# Patient Record
Sex: MALE | Race: WHITE | NOT HISPANIC OR LATINO | Employment: OTHER | URBAN - METROPOLITAN AREA
[De-identification: names, ages, dates, MRNs, and addresses within clinical notes are randomized per-mention and may not be internally consistent; named-entity substitution may affect disease eponyms.]

---

## 2017-01-05 ENCOUNTER — OFFICE VISIT (OUTPATIENT)
Dept: FAMILY MEDICINE | Facility: CLINIC | Age: 67
End: 2017-01-05
Payer: COMMERCIAL

## 2017-01-05 VITALS
SYSTOLIC BLOOD PRESSURE: 160 MMHG | HEART RATE: 76 BPM | DIASTOLIC BLOOD PRESSURE: 76 MMHG | WEIGHT: 288 LBS | OXYGEN SATURATION: 93 % | BODY MASS INDEX: 40.32 KG/M2 | HEIGHT: 71 IN | TEMPERATURE: 97.4 F

## 2017-01-05 DIAGNOSIS — R00.2 PALPITATIONS: ICD-10-CM

## 2017-01-05 DIAGNOSIS — M79.672 BILATERAL FOOT PAIN: Primary | ICD-10-CM

## 2017-01-05 DIAGNOSIS — E66.9 OBESITY, UNSPECIFIED OBESITY SEVERITY, UNSPECIFIED OBESITY TYPE: ICD-10-CM

## 2017-01-05 DIAGNOSIS — M79.10 MUSCLE PAIN: Primary | ICD-10-CM

## 2017-01-05 DIAGNOSIS — M79.671 BILATERAL FOOT PAIN: Primary | ICD-10-CM

## 2017-01-05 DIAGNOSIS — I10 HYPERTENSION GOAL BP (BLOOD PRESSURE) < 140/90: ICD-10-CM

## 2017-01-05 PROCEDURE — 99213 OFFICE O/P EST LOW 20 MIN: CPT | Performed by: FAMILY MEDICINE

## 2017-01-05 RX ORDER — METOPROLOL SUCCINATE 25 MG/1
25 TABLET, EXTENDED RELEASE ORAL DAILY
Qty: 30 TABLET | Refills: 1 | Status: SHIPPED | OUTPATIENT
Start: 2017-01-05 | End: 2017-02-16

## 2017-01-05 RX ORDER — VITAMIN E 268 MG
400 CAPSULE ORAL DAILY
Qty: 30 CAPSULE | COMMUNITY
Start: 2017-01-05 | End: 2018-03-12

## 2017-01-05 ASSESSMENT — PAIN SCALES - GENERAL: PAINLEVEL: MODERATE PAIN (5)

## 2017-01-05 NOTE — MR AVS SNAPSHOT
After Visit Summary   1/5/2017    Gucci Logan    MRN: [id number]           Patient Information     Date Of Birth          1950        Visit Information        Provider Department      1/5/2017 10:00 AM Richi Jaramillo MD [facility]        Today's Diagnoses     Bilateral foot pain    -  1     Hypertension goal BP (blood pressure) < 140/90           Care Instructions    Start metoprolol 25 mg once daily    Continue other blood pressure meds also    See us in a month or so    Call with side effects / problems    See podiatry    See cardiology after the echo is done    Increase activity/ exercise         Follow-ups after your visit        Additional Services     PODIATRY/FOOT & ANKLE SURGERY REFERRAL       Your provider has referred you to: FMG: [facility] [phone]   [email or web address]  FMG: [facility] [phone]   [email or web address]    Please be aware that coverage of these services is subject to the terms and limitations of your health insurance plan.  Call member services at your health plan with any benefit or coverage questions.      Please bring the following to your appointment:  >>   Any x-rays, CTs or MRIs which have been performed.  Contact the facility where they were done to arrange for  prior to your scheduled appointment.    >>   List of current medications   >>   This referral request   >>   Any documents/labs given to you for this referral                  Your next 10 appointments already scheduled     Jan 11, 2017  9:00 AM   New Visit with JAZ Hightower MD   [facility] AT [facility] ([facility])    [address] [place] [address] 2nd Floor  [facility] [address] [phone]              Who to contact     If you have questions or need follow up information about today's clinic  "visit or your schedule please contact [facility] directly at [phone].  Normal or non-critical lab and imaging results will be communicated to you by MyChart, letter or phone within 4 business days after the clinic has received the results. If you do not hear from us within 7 days, please contact the clinic through MyChart or phone. If you have a critical or abnormal lab result, we will notify you by phone as soon as possible.  Submit refill requests through [employer] or call your pharmacy and they will forward the refill request to us. Please allow 3 business days for your refill to be completed.          Additional Information About Your Visit        Care EveryWhere ID     This is your Care EveryWhere ID. This could be used by other organizations to access your [place] medical records  [id number]        Your Vitals Were     Pulse Temperature Height BMI (Body Mass Index) Pulse Oximetry       76 97.4  F (36.3  C) (Oral) 5' 11\" (1.803 m) 40.19 kg/m2 93%        Blood Pressure from Last 3 Encounters:   01/05/17 160/76   12/28/16 139/75   12/16/16 158/72    Weight from Last 3 Encounters:   01/05/17 288 lb (130.636 kg)   12/28/16 284 lb (128.822 kg)   12/16/16 278 lb (126.1 kg)              We Performed the Following     PODIATRY/FOOT & ANKLE SURGERY REFERRAL          Today's Medication Changes          These changes are accurate as of: 1/5/17 10:43 AM.  If you have any questions, ask your nurse or doctor.               Start taking these medicines.        Dose/Directions    metoprolol 25 MG 24 hr tablet   Commonly known as:  TOPROL-XL   Used for:  Hypertension goal BP (blood pressure) < 140/90   Started by:  Richi Jaramillo MD        Dose:  25 mg   Take 1 tablet (25 mg) by mouth daily   Quantity:  30 tablet   Refills:  1            Where to get your medicines      These medications were sent to [facility] - [address]  [address] " [place] [address]     Phone:  [phone]    - metoprolol 25 MG 24 hr tablet             Primary Care Provider Office Phone # Fax #    Richi Jaramillo [phone]       [facility] [address]  [facility] [address]        Thank you!     Thank you for choosing [facility]  for your care. Our goal is always to provide you with excellent care. Hearing back from our patients is one way we can continue to improve our services. Please take a few minutes to complete the written survey that you may receive in the mail after your visit with us. Thank you!             Your Updated Medication List - Protect others around you: Learn how to safely use, store and throw away your medicines at www.disposemymeds.org.          This list is accurate as of: 1/5/17 10:43 AM.  Always use your most recent med list.                   Brand Name Dispense Instructions for use    amLODIPine 2.5 MG tablet    NORVASC    90 tablet    Take 1 tablet (2.5 mg) by mouth daily       azithromycin 250 MG tablet    ZITHROMAX    6 tablet    2 po daily x one day then 1 po daily x 4 days       cyclobenzaprine 10 MG tablet    FLEXERIL    90 tablet    Take 1 tablet (10 mg) by mouth 3 times daily as needed for muscle spasms       glutamine 500 MG capsule      Take 1 capsule (500 mg) by mouth daily       hydrochlorothiazide 25 MG tablet    HYDRODIURIL    90 tablet    Take 1 tablet (25 mg) by mouth daily       losartan 100 MG tablet    COZAAR    90 tablet    Take 1 tablet (100 mg) by mouth daily       metoprolol 25 MG 24 hr tablet    TOPROL-XL    30 tablet    Take 1 tablet (25 mg) by mouth daily       omeprazole 20 MG tablet     90 tablet    Take 1 tablet (20 mg) by mouth daily Take 30-60 minutes before a meal.       oxyCODONE 5 MG IR tablet    ROXICODONE    30 tablet    Take 1 tablet (5 mg) by mouth every 6 hours as needed for pain       vitamin D 2000 UNITS Caps     90 capsule    1 po daily        vitamin E 400 UNIT capsule     30 capsule    Take 1 capsule (400 Units) by mouth daily

## 2017-01-05 NOTE — NURSING NOTE
"Chief Complaint   Patient presents with     RECHECK     heart      Health Maintenance       Initial /73 mmHg  Pulse 76  Temp(Src) 97.4  F (36.3  C) (Oral)  Ht 5' 11\" (1.803 m)  Wt 288 lb (130.636 kg)  BMI 40.19 kg/m2  SpO2 93% Estimated body mass index is 40.19 kg/(m^2) as calculated from the following:    Height as of this encounter: 5' 11\" (1.803 m).    Weight as of this encounter: 288 lb (130.636 kg).  BP completed using cuff size: large taken on the left arm  Sofia Conde CMA      "

## 2017-01-05 NOTE — PATIENT INSTRUCTIONS
Start metoprolol 25 mg once daily    Continue other blood pressure meds also    See us in a month or so    Call with side effects / problems    See podiatry    See cardiology after the echo is done    Increase activity/ exercise

## 2017-01-05 NOTE — Clinical Note
[address] [address] [phone]             2017      To whom it may concern:    Gucci Logan (  [birth year] ) is a 66 year old patient of mine with multiple medical problems.  Due to these issues he is not able to cut his toenails by himself.  Advise he be able to use health spending account to use for pedicures/ nail trimming.               Sincerely,                         Richi Jaramillo MD

## 2017-01-05 NOTE — PROGRESS NOTES
SUBJECTIVE:                                                    Gucci Logan is a 66 year old male who presents to clinic today for the following health issues:       Discuss getting a hospital bed  Patient has not had the echo yet  Restless legs  Runny nose when going out in the cold  [place] go [place]         Problem list and histories reviewed & adjusted, as indicated.  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  See us in a month, sooner if needed.     (E66.9) Obesity, unspecified obesity severity, unspecified obesity type  Comment: chronic   Plan: keep working on healthy diet/exercise and wt loss     (R00.2) Palpitations  Comment: symptoms present but not as bad   Plan: okay to wait on the echo and cardiology consult until he returns from travels       I reviewed the patient's medications, allergies, medical history, family history, and social history.    Richi Jaramillo MD

## 2017-01-05 NOTE — TELEPHONE ENCOUNTER
cyclobenzaprine (FLEXERIL) 10 MG tablet      Last Written Prescription Date:  12/2/16  Last Fill Quantity: 90,   # refills: 0  Last Office Visit with [facility] or [facility] prescribing provider: 1/5/17  Future Office visit:       Routing refill request to provider for review/approval because:  Drug not on the [facility] or [facility] refill protocol or controlled substance

## 2017-01-06 ENCOUNTER — OFFICE VISIT (OUTPATIENT)
Dept: PODIATRY | Facility: CLINIC | Age: 67
End: 2017-01-06
Payer: COMMERCIAL

## 2017-01-06 ENCOUNTER — TELEPHONE (OUTPATIENT)
Dept: FAMILY MEDICINE | Facility: CLINIC | Age: 67
End: 2017-01-06

## 2017-01-06 VITALS
BODY MASS INDEX: 40.32 KG/M2 | HEART RATE: 64 BPM | WEIGHT: 288 LBS | HEIGHT: 71 IN | SYSTOLIC BLOOD PRESSURE: 166 MMHG | DIASTOLIC BLOOD PRESSURE: 60 MMHG

## 2017-01-06 DIAGNOSIS — M20.41 HAMMER TOE OF RIGHT FOOT: ICD-10-CM

## 2017-01-06 DIAGNOSIS — Q66.50 CONGENITAL PES PLANUS, UNSPECIFIED LATERALITY: ICD-10-CM

## 2017-01-06 DIAGNOSIS — M79.672 PAIN IN BOTH FEET: Primary | ICD-10-CM

## 2017-01-06 DIAGNOSIS — M79.671 PAIN IN BOTH FEET: Primary | ICD-10-CM

## 2017-01-06 PROCEDURE — 99203 OFFICE O/P NEW LOW 30 MIN: CPT | Performed by: PODIATRIST

## 2017-01-06 RX ORDER — CYCLOBENZAPRINE HCL 10 MG
10 TABLET ORAL 3 TIMES DAILY PRN
Qty: 90 TABLET | Refills: 0 | Status: SHIPPED | OUTPATIENT
Start: 2017-01-06 | End: 2017-07-11

## 2017-01-06 NOTE — NURSING NOTE
"Chief Complaint   Patient presents with     Plantar Fascitis     B feet     Hammer Toe     surgery done 9/2016 right 2nd toe     NEUROPATHY       Initial /60 mmHg  Pulse 64  Ht 1.803 m (5' 11\")  Wt 130.636 kg (288 lb)  BMI 40.19 kg/m2 Estimated body mass index is 40.19 kg/(m^2) as calculated from the following:    Height as of this encounter: 1.803 m (5' 11\").    Weight as of this encounter: 130.636 kg (288 lb).  BP completed using cuff size: Large    "

## 2017-01-06 NOTE — PROGRESS NOTES
S:  Patient seen in consult from Dr. Jaramillo and complains of bilateral foot pain.  Points to medial arch.  Has had this for a few years.  Describes it as a burning pain.  Aggrevated by activity and relieved by rest.  Getting worse lately.  Standing at work.  He is a realtor, but now working less.  Had right second toe surgery 6 months ago and not happy since toe still elevated.  Went to work two weeks after surgery.        ROS:  Denies bruising, swelling,weakness, or numbness.       Allergies   Allergen Reactions     Influenza Vac Typ Other (See Comments)     Delirium, fever,       Current Outpatient Prescriptions   Medication Sig Dispense Refill     cyclobenzaprine (FLEXERIL) 10 MG tablet Take 1 tablet (10 mg) by mouth 3 times daily as needed for muscle spasms 90 tablet 0     vitamin E 400 UNIT capsule Take 1 capsule (400 Units) by mouth daily 30 capsule      glutamine 500 MG capsule Take 1 capsule (500 mg) by mouth daily       metoprolol (TOPROL-XL) 25 MG 24 hr tablet Take 1 tablet (25 mg) by mouth daily 30 tablet 1     oxyCODONE (ROXICODONE) 5 MG IR tablet Take 1 tablet (5 mg) by mouth every 6 hours as needed for pain 30 tablet 0     losartan (COZAAR) 100 MG tablet Take 1 tablet (100 mg) by mouth daily 90 tablet 1     hydrochlorothiazide (HYDRODIURIL) 25 MG tablet Take 1 tablet (25 mg) by mouth daily 90 tablet 1     amLODIPine (NORVASC) 2.5 MG tablet Take 1 tablet (2.5 mg) by mouth daily 90 tablet 1     omeprazole 20 MG tablet Take 1 tablet (20 mg) by mouth daily Take 30-60 minutes before a meal. 90 tablet 3     Cholecalciferol (VITAMIN D) 2000 UNITS CAPS 1 po daily 90 capsule 3     azithromycin (ZITHROMAX) 250 MG tablet 2 po daily x one day then 1 po daily x 4 days 6 tablet 1       Patient Active Problem List   Diagnosis     Advanced directives, counseling/discussion     CARDIOVASCULAR SCREENING; LDL GOAL LESS THAN 130     Hypertension goal BP (blood pressure) < 140/90     Anxiety     Obesity, unspecified obesity  "severity, unspecified obesity type     Gastroesophageal reflux disease, esophagitis presence not specified       Past Medical History   Diagnosis Date     Hypertension      Obesity 1/24/2013     Sleep apnea      Advised CPAP machine. Not keen to use it.      Esophageal reflux 3/1/2014     Liver disease      Arthritis      Hearing problem      Obstructive sleep apnea      Hiatal hernia        Past Surgical History   Procedure Laterality Date     C spinal fusion,ant,ea adnl level       T12 - L1     C open rx ankle dislocatn+fixatn       (R)     Arthroscopy knee rt/lt       (L) with partial medial meniscectomy     Endoscopic endonasal surgery  1994     Rotator cuff repair rt/lt Right      Hernia surgery Left 1994     Endoscopy  2-19-15     Nasal/sinus polypectomy         Family History   Problem Relation Age of Onset     Alzheimer Disease Mother 85     CEREBROVASCULAR DISEASE Father 50     CANCER Father      Neurologic Disorder No family hx of      Dementia Mother      DIABETES No family hx of      Hypertension Father        Social History   Substance Use Topics     Smoking status: Former Smoker -- 5 years     Types: Cigarettes     Start date: 01/01/1990     Quit date: 01/01/1999     Smokeless tobacco: Never Used     Alcohol Use: No      Comment: quit in 1999         O:  /60 mmHg  Pulse 64  Ht 1.803 m (5' 11\")  Wt 130.636 kg (288 lb)  BMI 40.19 kg/m2.  Good historian.  A&O X 3.  Pulses DP, PT 2/4 b/l.  CRT < 3 seconds X 10 digits.  Bilateral ankle edema or varicosities noted.  Sensation to light touch intact b/l.  Reflexes 2/4 b/l.  Skin thin, shiny, no hair and trophic changes b/l.  No forefoot deformities noted on left and right second toe elevated.  MS 5/5 all compartments.  Normal ROM all fore foot and rearfoot joints.  No equinus.  With weightbearing patient has bilateral pronation.  No pain with palpation or ROM.  No pain with stressing any muscle compartments.  Good calcaneal iversion with foot " flexion.  no erythema edema or ecchymosis or masses noted.  X-rays normal.      A:  Pronation causing pain       Right second hammertoe    P:  Personally reviewed x-rays.  RX for custom orthotics.  Discussed importance of wearing these in a good shoe at all times to prevent future problems.  Discussed good house shoes at all times until resolved.  Avoid activities that bother this.  he will manually reduce toe daily and instructed him on taping this down with band aid.    RETURN TO CLINIC PRN.  Thank you for allowing me participate in the care of this patient.        Scooter Richards DPM, FACFAS

## 2017-01-06 NOTE — TELEPHONE ENCOUNTER
Routed to provider as FYI regarding patient's visit with Dr. Richards today.    Lashell Isidro RN  [facility]

## 2017-01-06 NOTE — TELEPHONE ENCOUNTER
Reason for Call:  Other     Detailed comments: Patient saw Dr. Richards today and wanted you to check notes about bld pressure & pulse just started new meds     Phone Number Patient can be reached at: Home number on file [phone] (home)    Best Time: Anytime    Can we leave a detailed message on this number? YES    Call taken on 1/6/2017 at 10:36 AM by Natalia Sainz

## 2017-01-06 NOTE — MR AVS SNAPSHOT
After Visit Summary   1/6/2017    Gucci Logan    MRN: [id number]           Patient Information     Date Of Birth          1950        Visit Information        Provider Department      1/6/2017 10:15 AM Scooter Richards DPM [facility]        Care Instructions    We wish you continued good healing. If you have any questions or concerns, please do not hesitate to contact us at [phone].      Please remember to call and schedule a follow up appointment if one was recommended at your earliest convenience.   PODIATRY CLINIC HOURS  TELEPHONE NUMBER    Dr. Scooter BERRIOSPSHA FAC FAS    Clinics:  [facility]        Tiffanie Bobby MA  Medical Assistant  Tuesday 1PM-6PM  [facility]  Wednesday 7AM-2PM  [place]  Thursday 10AM-6PM  [place]  Friday 7AM-345PM  [place]  Specialty schedulers:   [phone] to make an appointment with any Specialty Provider.        Urgent Care locations:    [facility] Monday-Friday 5 pm - 9 pm. Saturday-Sunday 9 am -5pm    Monday-Friday 11 am - 9 pm Saturday 9 am - 5 pm     Monday-Sunday 12 noon-8PM [phone]     If you need a medication refill, please contact us you may need lab work and/or a follow up visit prior to your refill (i.e. Antifungal medications).    [employer] (secure e-mail communication and access to your chart) to send a message or to make an appointment.    If MRI needed please call Valentin Bill at [phone]        Weight management plan: Patient was referred to their PCP to discuss a diet and exercise plan.          Follow-ups after your visit        Who to contact     If you have questions or need follow up information about today's clinic visit or your schedule please contact [facility] directly at [phone].  Normal or non-critical lab and  "imaging results will be communicated to you by MyChart, letter or phone within 4 business days after the clinic has received the results. If you do not hear from us within 7 days, please contact the clinic through MyChart or phone. If you have a critical or abnormal lab result, we will notify you by phone as soon as possible.  Submit refill requests through [employer] or call your pharmacy and they will forward the refill request to us. Please allow 3 business days for your refill to be completed.          Additional Information About Your Visit        Care EveryWhere ID     This is your Care EveryWhere ID. This could be used by other organizations to access your [place] medical records  [id number]        Your Vitals Were     Pulse Height BMI (Body Mass Index)             64 1.803 m (5' 11\") 40.19 kg/m2          Blood Pressure from Last 3 Encounters:   01/05/17 160/76   12/28/16 139/75   12/16/16 158/72    Weight from Last 3 Encounters:   01/06/17 130.636 kg (288 lb)   01/05/17 130.636 kg (288 lb)   12/28/16 128.822 kg (284 lb)              Today, you had the following     No orders found for display       Primary Care Provider Office Phone # Fax #    Richi Jaramillo [phone]       [facility] [address] [facility] [address]        Thank you!     Thank you for choosing [facility]  for your care. Our goal is always to provide you with excellent care. Hearing back from our patients is one way we can continue to improve our services. Please take a few minutes to complete the written survey that you may receive in the mail after your visit with us. Thank you!             Your Updated Medication List - Protect others around you: Learn how to safely use, store and throw away your medicines at www.disposemymeds.org.          This list is accurate as of: 1/6/17 10:17 AM.  Always use your most recent med list.                   Brand Name Dispense " Instructions for use    amLODIPine 2.5 MG tablet    NORVASC    90 tablet    Take 1 tablet (2.5 mg) by mouth daily       azithromycin 250 MG tablet    ZITHROMAX    6 tablet    2 po daily x one day then 1 po daily x 4 days       cyclobenzaprine 10 MG tablet    FLEXERIL    90 tablet    Take 1 tablet (10 mg) by mouth 3 times daily as needed for muscle spasms       glutamine 500 MG capsule      Take 1 capsule (500 mg) by mouth daily       hydrochlorothiazide 25 MG tablet    HYDRODIURIL    90 tablet    Take 1 tablet (25 mg) by mouth daily       losartan 100 MG tablet    COZAAR    90 tablet    Take 1 tablet (100 mg) by mouth daily       metoprolol 25 MG 24 hr tablet    TOPROL-XL    30 tablet    Take 1 tablet (25 mg) by mouth daily       omeprazole 20 MG tablet     90 tablet    Take 1 tablet (20 mg) by mouth daily Take 30-60 minutes before a meal.       oxyCODONE 5 MG IR tablet    ROXICODONE    30 tablet    Take 1 tablet (5 mg) by mouth every 6 hours as needed for pain       vitamin D 2000 UNITS Caps     90 capsule    1 po daily       vitamin E 400 UNIT capsule     30 capsule    Take 1 capsule (400 Units) by mouth daily

## 2017-01-20 ENCOUNTER — TELEPHONE (OUTPATIENT)
Dept: FAMILY MEDICINE | Facility: CLINIC | Age: 67
End: 2017-01-20

## 2017-01-20 DIAGNOSIS — J20.9 ACUTE BRONCHITIS WITH SYMPTOMS > 10 DAYS: Primary | ICD-10-CM

## 2017-01-20 RX ORDER — AZITHROMYCIN 250 MG/1
TABLET, FILM COATED ORAL
Qty: 6 TABLET | Refills: 1 | Status: SHIPPED | OUTPATIENT
Start: 2017-01-20 | End: 2017-02-16

## 2017-01-20 NOTE — TELEPHONE ENCOUNTER
Patient contacted me.  Continued infection symptoms. Will do refill of the azith.  Richi Jaramillo MD

## 2017-02-15 ENCOUNTER — TELEPHONE (OUTPATIENT)
Dept: FAMILY MEDICINE | Facility: CLINIC | Age: 67
End: 2017-02-15

## 2017-02-15 NOTE — TELEPHONE ENCOUNTER
Called patient at [phone] (home). Patient wants to set up ECHO appointment. Transferred to  to set up appointment.    Meagan Walton RN  [facility]

## 2017-02-15 NOTE — TELEPHONE ENCOUNTER
Reason for Call: Request for an order or referral:    Order or referral being requested: ORDER    Date needed: as soon as possible    Has the patient been seen by the PCP for this problem? YES    Additional comments: Patient would like you to set up ECHO appointment.  Please is seeing you tomorrow.    Phone number Patient can be reached at:  Home number on file [phone] (home)    Best Time:  Anytime    Can we leave a detailed message on this number?  NO    Call taken on 2/15/2017 at 9:35 AM by Natalia Sainz

## 2017-02-16 ENCOUNTER — RADIANT APPOINTMENT (OUTPATIENT)
Dept: CARDIOLOGY | Facility: CLINIC | Age: 67
End: 2017-02-16
Attending: FAMILY MEDICINE
Payer: COMMERCIAL

## 2017-02-16 ENCOUNTER — MYC MEDICAL ADVICE (OUTPATIENT)
Dept: FAMILY MEDICINE | Facility: CLINIC | Age: 67
End: 2017-02-16

## 2017-02-16 ENCOUNTER — OFFICE VISIT (OUTPATIENT)
Dept: FAMILY MEDICINE | Facility: CLINIC | Age: 67
End: 2017-02-16
Payer: COMMERCIAL

## 2017-02-16 VITALS
WEIGHT: 289.5 LBS | DIASTOLIC BLOOD PRESSURE: 70 MMHG | HEART RATE: 73 BPM | OXYGEN SATURATION: 95 % | TEMPERATURE: 97.9 F | SYSTOLIC BLOOD PRESSURE: 134 MMHG | BODY MASS INDEX: 40.38 KG/M2

## 2017-02-16 DIAGNOSIS — R00.2 PALPITATIONS: ICD-10-CM

## 2017-02-16 DIAGNOSIS — S20.212A CONTUSION OF RIB, LEFT, INITIAL ENCOUNTER: ICD-10-CM

## 2017-02-16 DIAGNOSIS — M25.50 MULTIPLE JOINT PAIN: ICD-10-CM

## 2017-02-16 DIAGNOSIS — G57.10 MERALGIA PARAESTHETICA, UNSPECIFIED LATERALITY: ICD-10-CM

## 2017-02-16 DIAGNOSIS — R94.31 ABNORMAL EKG: ICD-10-CM

## 2017-02-16 DIAGNOSIS — E66.9 OBESITY, UNSPECIFIED OBESITY SEVERITY, UNSPECIFIED OBESITY TYPE: ICD-10-CM

## 2017-02-16 DIAGNOSIS — J20.9 ACUTE BRONCHITIS WITH SYMPTOMS > 10 DAYS: Primary | ICD-10-CM

## 2017-02-16 DIAGNOSIS — I10 HYPERTENSION GOAL BP (BLOOD PRESSURE) < 140/90: ICD-10-CM

## 2017-02-16 PROCEDURE — 99214 OFFICE O/P EST MOD 30 MIN: CPT | Performed by: FAMILY MEDICINE

## 2017-02-16 PROCEDURE — 93306 TTE W/DOPPLER COMPLETE: CPT | Performed by: INTERNAL MEDICINE

## 2017-02-16 RX ORDER — OXYCODONE HYDROCHLORIDE 5 MG/1
5 TABLET ORAL EVERY 6 HOURS PRN
Qty: 30 TABLET | Refills: 0 | Status: SHIPPED | OUTPATIENT
Start: 2017-02-16 | End: 2017-04-19

## 2017-02-16 RX ORDER — METOPROLOL SUCCINATE 25 MG/1
25 TABLET, EXTENDED RELEASE ORAL DAILY
Qty: 90 TABLET | Refills: 1 | Status: SHIPPED | OUTPATIENT
Start: 2017-02-16 | End: 2017-07-12

## 2017-02-16 RX ORDER — AZITHROMYCIN 250 MG/1
TABLET, FILM COATED ORAL
Qty: 6 TABLET | Refills: 1 | Status: SHIPPED | OUTPATIENT
Start: 2017-02-16 | End: 2017-02-27

## 2017-02-16 ASSESSMENT — PAIN SCALES - GENERAL: PAINLEVEL: SEVERE PAIN (6)

## 2017-02-16 NOTE — NURSING NOTE
"Chief Complaint   Patient presents with     RECHECK     heart and ribs     Health Maintenance       Initial /72 (BP Location: Left arm, Patient Position: Chair, Cuff Size: Adult Regular)  Pulse 73  Temp 97.9  F (36.6  C) (Oral)  Wt 289 lb 8 oz (131.3 kg)  SpO2 95%  BMI 40.38 kg/m2 Estimated body mass index is 40.38 kg/(m^2) as calculated from the following:    Height as of 1/6/17: 5' 11\" (1.803 m).    Weight as of this encounter: 289 lb 8 oz (131.3 kg).  Medication Reconciliation: complete   Sofia Conde CMA      "

## 2017-02-16 NOTE — PROGRESS NOTES
SUBJECTIVE:                                                    Gucci Logan is a 66 year old male who presents to clinic today for the following health issues:       Patient had a fall on 01/31/2017 and has some rib pain  Follow up on heart  Cold symptoms for the past couple weeks  Neuropathy and back problems         Problem list and histories reviewed & adjusted, as indicated.  Additional history: as documented          fell forward, holding phone    One rib area getting better but one is not    Coughing up some phlegm for a couple weeks        Exam; the area on left flank is not tender anymore    But the area left upper chest is sore on palpation    No bruising/ discoloration    Echo today midday    Has had numbness in the thighs but actually better.    Has had that for years.    Possible long term/ consulting this summer        Physical Exam   Constitutional: He is oriented to person, place, and time and well-developed, well-nourished, and in no distress.   HENT:   Head: Normocephalic and atraumatic.   Right Ear: Tympanic membrane, external ear and ear canal normal.   Left Ear: Tympanic membrane, external ear and ear canal normal.   Nose: Nose normal.   Throat is quite dry   Eyes: Conjunctivae are normal.   Neck: Neck supple.   Cardiovascular: Normal rate, regular rhythm and normal heart sounds.    Pulmonary/Chest: Effort normal and breath sounds normal. No respiratory distress. He exhibits tenderness (see in note).   Abdominal: Soft.   Lymphadenopathy:     He has no cervical adenopathy.   Neurological: He is alert and oriented to person, place, and time.       ASSESSMENT / PLAN:  (J20.9) Acute bronchitis with symptoms > 10 days  (primary encounter diagnosis)  Comment: over 2 weeks of symptoms   Plan: azithromycin (ZITHROMAX) 250 MG tablet        Will treat; follow up prn     (G57.10) Meralgia paraesthetica, unspecified laterality  Comment: has had this for long time; discussed in detail   Plan:  handout given.  Monitor symptoms.     (M25.50) Multiple joint pain  Comment: uses occasionally.    Plan: oxyCODONE (ROXICODONE) 5 MG IR tablet             (I10) Hypertension goal BP (blood pressure) < 140/90  Comment: on recheck blood pressure was okay  Plan: metoprolol (TOPROL-XL) 25 MG 24 hr tablet        The addition of the BB has helped     (S20.212A) Contusion of rib, left, initial encounter  Comment: moderate; anticipate resolution of symptoms   Plan: follow up prn     (E66.9) Obesity, unspecified obesity severity, unspecified obesity type  Comment: chronic   Plan: keep working on healthy diet/exercise and wt loss    (R00.2) Palpitations  Comment: to have echo today but actually symptoms are better since adding BB  Plan: monitor symptoms; email patient with echo results       I reviewed the patient's medications, allergies, medical history, family history, and social history.    Richi Jaramillo MD

## 2017-02-16 NOTE — LETTER
[facility]   [address] [place]  [address]  [phone]                                   2017    Laurent Pereira  [address] [place] [address]        Dear Laurent,    Your heart is structurally normal and functioning normally.  Monitor palpitation and follow up as needed based on symptoms.     Results for orders placed or performed in visit on 17   Echocardiogram Complete    Narrative    [id number]  [facility]  [id number]  [id number]^ANDREW^SOMMER^BRADY           [facility], Echocardiography Laboratory  [address] [place] [id number]        Name: LAURENT PEREIRA  MRN: [id number]  : 1950  Study Date: 2017 12:58 PM  Age: 66 yrs  Gender: Male  Patient Location: [facility]  Reason For Study: , Abnormal electrocardiogram (ECG) (EKG), Palpitations  Ordering Physician: SOMMER MORALES  Referring Physician: SOMMER MORALES  Performed By: Gabriela Arenas RDCS     BSA: 2.5 m2  Height: 71 in  Weight: 288 lb  HR: 77  BP: 166/80 mmHg  _____________________________________________________________________________  __        Procedure  Echocardiogram with two-dimensional, color and spectral Doppler performed.  _____________________________________________________________________________  __        Interpretation Summary     Global and regional left ventricular function is normal with an EF of 55-60%.  Mild to moderate right ventricular dilation is present. Global right  ventricular function is normal.  Pulmonary artery systolic pressure is normal. The right ventricular systolic  pressure is 26mmHg above the right atrial pressure.  The inferior vena cava is normal in size with preserved respiratory  variability. The estimated mean right atrial pressure is <3 mmHg.  No pericardial effusion is present.  _____________________________________________________________________________  __        Left Ventricle  Global and regional left  ventricular function is normal with an EF of 55-60%.  Left ventricular size is normal. The LV mass index is 95 g/m2 (within  reference range.) The LV geometry is c/w normal geometry measured by relative  wall thickness. Normal left ventricular filling for age. No regional wall  motion abnormalities are seen.     Right Ventricle  Global right ventricular function is normal. Mild to moderate right  ventricular dilation is present.     Atria  The right atria appears normal. Severe left atrial enlargement is present.     Mitral Valve  Mild mitral annular calcification is present. Trace to mild mitral  insufficiency is present.        Aortic Valve  Mild aortic insufficiency is present.     Tricuspid Valve  The tricuspid valve is normal. Trace to mild tricuspid insufficiency is  present. Pulmonary artery systolic pressure is normal. Right ventricular  systolic pressure is 26mmHg above the right atrial pressure.     Pulmonic Valve  The pulmonic valve is normal. Trace pulmonic insufficiency is present.     Vessels  The aorta root cannot be assessed. The inferior vena cava was normal in size  with preserved respiratory variability. Estimated mean right atrial pressure  is <3 mmHg.     Pericardium  No pericardial effusion is present.        Compared to Previous Study  Compared with 6/25/2015, there is probably no change but differences in  quality limit comparison.  _____________________________________________________________________________  __  MMode/2D Measurements & Calculations  IVSd: 1.4 cm     LVIDd: 5.5 cm  LVIDs: 3.0 cm  LVPWd: 0.79 cm  FS: 44.5 %  EDV(Teich): 146.2 ml  ESV(Teich): 36.2 ml  LV mass(C)d: 241.7 grams  Ao root diam: 3.6 cm  asc Aorta Diam: 3.6 cm  LA/Ao: 1.2  LVOT diam: 2.4 cm  LVOT area: 4.5 cm2  LA volume (AL/bp): 120.4 cm3     LA Volume Indexed (AL/bp): 48.9 ml/m2        Doppler Measurements & Calculations  MV E max dianne: 72.6 cm/sec  MV A max dianne: 71.1 cm/sec  MV E/A: 1.0  MV dec time: 0.22 sec  Ao  V2 max: 153.0 cm/sec  Ao max P.4 mmHg  JIMMY(V,D): 3.6 cm2  LV V1 max P.1 mmHg  LV V1 max: 123.0 cm/sec  LV V1 VTI: 26.8 cm  CO(LVOT): 8.5 l/min  CI(LVOT): 3.4 l/min/m2  SV(LVOT): 121.2 ml  PA V2 max: 139.0 cm/sec  PA max P.7 mmHg  TR max dudley: 254.5 cm/sec  TR max P.9 mmHg  Pulm Sys Dudley: 47.3 cm/sec  Pulm Herman Dudley: 25.0 cm/sec  Pulm S/D: 1.9  Lateral E/e': 9.9     Medial E/e': 10.3           _____________________________________________________________________________  __           Report approved by: Madhuri Adkins 2017 02:42 PM          If you have any questions please call the clinic at [phone]    Sincerely,    Richi Jaramillo MD   bmd

## 2017-02-16 NOTE — MR AVS SNAPSHOT
After Visit Summary   2/16/2017    Gucci Logan    MRN: [id number]           Patient Information     Date Of Birth          1950        Visit Information        Provider Department      2/16/2017 8:00 AM Richi Jaramillo MD [facility]        Today's Diagnoses     Meralgia paraesthetica, unspecified laterality    -  1    Acute bronchitis with symptoms > 10 days        Multiple joint pain          Care Instructions    We will contact you with the echo results    Use pain med sparingly     Keep working on healthy diet/exercise and wt loss            Follow-ups after your visit        Your next 10 appointments already scheduled     Feb 16, 2017  1:00 PM CST   Ech Complete with [id number]   [facility]    [address] [place] [id number] [phone]           1.  Please bring or wear a comfortable two-piece outfit. 2.  You may eat, drink and take your normal medicines. 3.  For any questions that cannot be answered, please contact the ordering physician              Who to contact     If you have questions or need follow up information about today's clinic visit or your schedule please contact [facility] directly at [phone].  Normal or non-critical lab and imaging results will be communicated to you by MyChart, letter or phone within 4 business days after the clinic has received the results. If you do not hear from us within 7 days, please contact the clinic through MyChart or phone. If you have a critical or abnormal lab result, we will notify you by phone as soon as possible.  Submit refill requests through Partender or call your pharmacy and they will forward the refill request to us. Please allow 3 business days for your refill to be completed.          Additional Information About Your Visit        Care EveryWhere ID     This is your Care EveryWhere ID. This  could be used by other organizations to access your [facility] medical records  [id number]        Your Vitals Were     Pulse Temperature Pulse Oximetry BMI (Body Mass Index)          73 97.9  F (36.6  C) (Oral) 95% 40.38 kg/m2         Blood Pressure from Last 3 Encounters:   02/16/17 134/70   01/06/17 166/60   01/05/17 160/76    Weight from Last 3 Encounters:   02/16/17 289 lb 8 oz (131.3 kg)   01/06/17 288 lb (130.6 kg)   01/05/17 288 lb (130.6 kg)              Today, you had the following     No orders found for display         Where to get your medicines      These medications were sent to [id number] - [address]ET [address]LEGOS  [address]  [address], [place] [address]     Phone:  [phone]     azithromycin 250 MG tablet         Some of these will need a paper prescription and others can be bought over the counter.  Ask your nurse if you have questions.     Bring a paper prescription for each of these medications     oxyCODONE 5 MG IR tablet          Primary Care Provider Office Phone # Fax #    Richi Jaramillo -[phone]       [address] [address]        Thank you!     Thank you for choosing [facility]  for your care. Our goal is always to provide you with excellent care. Hearing back from our patients is one way we can continue to improve our services. Please take a few minutes to complete the written survey that you may receive in the mail after your visit with us. Thank you!             Your Updated Medication List - Protect others around you: Learn how to safely use, store and throw away your medicines at www.disposemymeds.org.          This list is accurate as of: 2/16/17  8:25 AM.  Always use your most recent med list.                   Brand Name Dispense Instructions for use    amLODIPine 2.5 MG tablet    NORVASC    90 tablet    Take 1 tablet (2.5 mg) by mouth daily        azithromycin 250 MG tablet    ZITHROMAX    6 tablet    2 po daily x one day then 1 po daily x 4 days       cyclobenzaprine 10 MG tablet    FLEXERIL    90 tablet    Take 1 tablet (10 mg) by mouth 3 times daily as needed for muscle spasms       glutamine 500 MG capsule      Take 1 capsule (500 mg) by mouth daily       hydrochlorothiazide 25 MG tablet    HYDRODIURIL    90 tablet    Take 1 tablet (25 mg) by mouth daily       losartan 100 MG tablet    COZAAR    90 tablet    Take 1 tablet (100 mg) by mouth daily       metoprolol 25 MG 24 hr tablet    TOPROL-XL    30 tablet    Take 1 tablet (25 mg) by mouth daily       omeprazole 20 MG tablet     90 tablet    Take 1 tablet (20 mg) by mouth daily Take 30-60 minutes before a meal.       oxyCODONE 5 MG IR tablet    ROXICODONE    30 tablet    Take 1 tablet (5 mg) by mouth every 6 hours as needed for pain       vitamin D 2000 UNITS Caps     90 capsule    1 po daily       vitamin E 400 UNIT capsule     30 capsule    Take 1 capsule (400 Units) by mouth daily

## 2017-02-17 NOTE — PROGRESS NOTES
Your heart is structurally normal and functioning normally.  Monitor palpitation and follow up as needed based on symptoms.    Richi Jaramillo MD

## 2017-02-27 ENCOUNTER — TELEPHONE (OUTPATIENT)
Dept: FAMILY MEDICINE | Facility: CLINIC | Age: 67
End: 2017-02-27

## 2017-02-27 DIAGNOSIS — J20.9 ACUTE BRONCHITIS WITH SYMPTOMS > 10 DAYS: ICD-10-CM

## 2017-02-27 RX ORDER — AZITHROMYCIN 250 MG/1
TABLET, FILM COATED ORAL
Qty: 6 TABLET | Refills: 1 | Status: SHIPPED | OUTPATIENT
Start: 2017-02-27 | End: 2017-04-19

## 2017-04-06 DIAGNOSIS — R60.0 BILATERAL LOWER EXTREMITY EDEMA: ICD-10-CM

## 2017-04-06 DIAGNOSIS — I10 ESSENTIAL HYPERTENSION WITH GOAL BLOOD PRESSURE LESS THAN 140/90: ICD-10-CM

## 2017-04-06 NOTE — TELEPHONE ENCOUNTER
amLODIPine (NORVASC) 2.5 MG tablet      Last Written Prescription Date: 10-6-16  Last Fill Quantity: 90, # refills: 1    Last Office Visit with [facility] prescribing provider:  2-16-17   Future Office Visit:        BP Readings from Last 3 Encounters:   02/16/17 134/70   01/06/17 166/60   01/05/17 160/76     hydrochlorothiazide (HYDRODIURIL) 25 MG tablet      Last Written Prescription Date: 10-6-16  Last Fill Quantity: 90, # refills: 1  Last Office Visit with [facility] or [facility] prescribing provider: 2-16-17       Potassium   Date Value Ref Range Status   07/11/2016 3.9 3.4 - 5.3 mmol/L Final     Creatinine   Date Value Ref Range Status   07/11/2016 0.88 0.66 - 1.25 mg/dL Final     BP Readings from Last 3 Encounters:   02/16/17 134/70   01/06/17 166/60   01/05/17 160/76

## 2017-04-10 RX ORDER — HYDROCHLOROTHIAZIDE 25 MG/1
TABLET ORAL
Qty: 90 TABLET | Refills: 0 | Status: SHIPPED | OUTPATIENT
Start: 2017-04-10 | End: 2017-07-06

## 2017-04-10 RX ORDER — AMLODIPINE BESYLATE 2.5 MG/1
TABLET ORAL
Qty: 90 TABLET | Refills: 0 | Status: SHIPPED | OUTPATIENT
Start: 2017-04-10 | End: 2017-07-06

## 2017-04-10 NOTE — TELEPHONE ENCOUNTER
Prescription approved per [facility] Refill Protocol.     Jessica Mast RN

## 2017-04-14 ENCOUNTER — TELEPHONE (OUTPATIENT)
Dept: FAMILY MEDICINE | Facility: CLINIC | Age: 67
End: 2017-04-14

## 2017-04-14 NOTE — TELEPHONE ENCOUNTER
"Patient was seen by [facility] 2/16/17 after a fall, was given oxycodone for \"multiple joint pain\".  I don't see evidence of using lyrica in the past.  Unsure of plan.  Appears this was faxed request, will call patient later in the morning.  Connie Garza RN  [facility]      "

## 2017-04-14 NOTE — LETTER
[address] [id number] [phone]      April 20, 2017      Gucci Logan  [facility] [facility] [address]        Dear Gucci,    We have tried to reach you by phone, but have been unable to connect with you.    We were calling to follow up with you about a refill request we received from your pharmacy.  We need to speak with you before we are able to refill it.    Please call us at [phone] and ask to speak with a Nurse.      Thank You        Sincerely,    Richi Jaramillo MD  [facility]

## 2017-04-14 NOTE — TELEPHONE ENCOUNTER
Called patient at both cell and home number listed to inquire about Lyrica request and if he's had it before from elsewhere. Unable to reach, unable to leave a VM as mailbox has not been set up.    Lashell Isidro RN  [facility]

## 2017-04-18 NOTE — TELEPHONE ENCOUNTER
Called patient at both numbers listed. Home number has either been changed or disconnected and mobile number has a mailbox that is not set up.  Unable to reach or leave a message on either.     Lashell Isidro RN  [facility]

## 2017-04-19 ENCOUNTER — OFFICE VISIT (OUTPATIENT)
Dept: FAMILY MEDICINE | Facility: CLINIC | Age: 67
End: 2017-04-19
Payer: COMMERCIAL

## 2017-04-19 VITALS
BODY MASS INDEX: 40.45 KG/M2 | OXYGEN SATURATION: 95 % | TEMPERATURE: 98.1 F | WEIGHT: 290 LBS | DIASTOLIC BLOOD PRESSURE: 69 MMHG | SYSTOLIC BLOOD PRESSURE: 137 MMHG | HEART RATE: 65 BPM

## 2017-04-19 DIAGNOSIS — M25.50 MULTIPLE JOINT PAIN: ICD-10-CM

## 2017-04-19 DIAGNOSIS — I10 HYPERTENSION GOAL BP (BLOOD PRESSURE) < 140/90: ICD-10-CM

## 2017-04-19 DIAGNOSIS — G62.9 NEUROPATHY: Primary | ICD-10-CM

## 2017-04-19 DIAGNOSIS — J20.9 ACUTE BRONCHITIS WITH SYMPTOMS > 10 DAYS: ICD-10-CM

## 2017-04-19 DIAGNOSIS — R53.83 FATIGUE, UNSPECIFIED TYPE: ICD-10-CM

## 2017-04-19 DIAGNOSIS — E66.9 OBESITY, UNSPECIFIED OBESITY SEVERITY, UNSPECIFIED OBESITY TYPE: ICD-10-CM

## 2017-04-19 DIAGNOSIS — E78.5 HYPERLIPIDEMIA LDL GOAL <100: ICD-10-CM

## 2017-04-19 DIAGNOSIS — R73.01 IMPAIRED FASTING GLUCOSE: ICD-10-CM

## 2017-04-19 PROCEDURE — 99214 OFFICE O/P EST MOD 30 MIN: CPT | Performed by: FAMILY MEDICINE

## 2017-04-19 RX ORDER — OXYCODONE HYDROCHLORIDE 5 MG/1
5 TABLET ORAL EVERY 6 HOURS PRN
Qty: 30 TABLET | Refills: 0 | Status: SHIPPED | OUTPATIENT
Start: 2017-04-19 | End: 2017-08-18

## 2017-04-19 RX ORDER — PREGABALIN 50 MG/1
50 CAPSULE ORAL 3 TIMES DAILY
Qty: 90 CAPSULE | Refills: 1 | Status: SHIPPED | OUTPATIENT
Start: 2017-04-19 | End: 2017-09-13

## 2017-04-19 RX ORDER — AZITHROMYCIN 250 MG/1
TABLET, FILM COATED ORAL
Qty: 6 TABLET | Refills: 1 | Status: SHIPPED | OUTPATIENT
Start: 2017-04-19 | End: 2017-07-06

## 2017-04-19 ASSESSMENT — PAIN SCALES - GENERAL: PAINLEVEL: MILD PAIN (3)

## 2017-04-19 NOTE — MR AVS SNAPSHOT
After Visit Summary   4/19/2017    Gucci Logan    MRN: [id number]           Patient Information     Date Of Birth          1950        Visit Information        Provider Department      4/19/2017 9:40 AM Richi Jaramillo MD [facility]        Today's Diagnoses     Neuropathy (H)    -  1    Impaired fasting glucose        Hyperlipidemia LDL goal <100        Fatigue, unspecified type        Acute bronchitis with symptoms > 10 days        Multiple joint pain          Care Instructions    Increase hydration    See how much lyrica is     Keep working on healthy diet/exercise and wt loss    Only rarely use oxycodone    Schedule lab only appointment          Follow-ups after your visit        Future tests that were ordered for you today     Open Future Orders        Priority Expected Expires Ordered    Hemoglobin A1c Routine  9/19/2017 4/19/2017    Comprehensive metabolic panel Routine  9/19/2017 4/19/2017    Lipid panel reflex to direct LDL Routine  9/19/2017 4/19/2017    TSH with free T4 reflex Routine  9/19/2017 4/19/2017    CBC with platelets differential Routine  9/19/2017 4/19/2017            Who to contact     If you have questions or need follow up information about today's clinic visit or your schedule please contact [facility] directly at [phone].  Normal or non-critical lab and imaging results will be communicated to you by MyChart, letter or phone within 4 business days after the clinic has received the results. If you do not hear from us within 7 days, please contact the clinic through MyChart or phone. If you have a critical or abnormal lab result, we will notify you by phone as soon as possible.  Submit refill requests through Workspot or call your pharmacy and they will forward the refill request to us. Please allow 3 business days for your refill to be completed.          Additional Information About Your Visit        MyChart  "Information     [email or web address] lets you send messages to your doctor, view your test results, renew your prescriptions, schedule appointments and more. To sign up, go to [email or web address] . Click on \"Log in\" on the left side of the screen, which will take you to the Welcome page. Then click on \"Sign up Now\" on the right side of the page.     You will be asked to enter the access code listed below, as well as some personal information. Please follow the directions to create your username and password.     Your access code is: [email or web address]  Expires: 2017 10:37 AM     Your access code will  in 90 days. If you need help or a new code, please call your [place] clinic or [phone].        Care EveryWhere ID     This is your Care EveryWhere ID. This could be used by other organizations to access your [place] medical records  [id number]        Your Vitals Were     Pulse Temperature Pulse Oximetry BMI (Body Mass Index)          65 98.1  F (36.7  C) (Oral) 95% 40.45 kg/m2         Blood Pressure from Last 3 Encounters:   17 137/69   17 134/70   17 166/60    Weight from Last 3 Encounters:   17 290 lb (131.5 kg)   17 289 lb 8 oz (131.3 kg)   17 288 lb (130.6 kg)                 Today's Medication Changes          These changes are accurate as of: 17 10:37 AM.  If you have any questions, ask your nurse or doctor.               Start taking these medicines.        Dose/Directions    pregabalin 50 MG capsule   Commonly known as:  LYRICA   Used for:  Neuropathy (H)   Started by:  Richi Jaramillo MD        Dose:  50 mg   Take 1 capsule (50 mg) by mouth 3 times daily   Quantity:  90 capsule   Refills:  1            Where to get your medicines      Some of these will need a paper prescription and others can be bought over the counter.  Ask your nurse if you have questions.     Bring a paper prescription for each of these medications     azithromycin 250 MG tablet    oxyCODONE " 5 MG IR tablet    pregabalin 50 MG capsule                Primary Care Provider Office Phone # Fax #    Richi Jaramillo [phone]       [facility] [address]  [facility] [address]        Thank you!     Thank you for choosing [facility]  for your care. Our goal is always to provide you with excellent care. Hearing back from our patients is one way we can continue to improve our services. Please take a few minutes to complete the written survey that you may receive in the mail after your visit with us. Thank you!             Your Updated Medication List - Protect others around you: Learn how to safely use, store and throw away your medicines at www.disposemymeds.org.          This list is accurate as of: 4/19/17 10:37 AM.  Always use your most recent med list.                   Brand Name Dispense Instructions for use    amLODIPine 2.5 MG tablet    NORVASC    90 tablet    TAKE ONE TABLET BY MOUTH ONCE DAILY       azithromycin 250 MG tablet    ZITHROMAX    6 tablet    2 po daily x one day then 1 po daily x 4 days       biotin 2.5 mg/mL Susp      Take by mouth daily       cyclobenzaprine 10 MG tablet    FLEXERIL    90 tablet    Take 1 tablet (10 mg) by mouth 3 times daily as needed for muscle spasms       glutamine 500 MG capsule      Take 1 capsule (500 mg) by mouth daily       hydrochlorothiazide 25 MG tablet    HYDRODIURIL    90 tablet    TAKE ONE TABLET BY MOUTH ONCE DAILY       losartan 100 MG tablet    COZAAR    90 tablet    Take 1 tablet (100 mg) by mouth daily       metoprolol 25 MG 24 hr tablet    TOPROL-XL    90 tablet    Take 1 tablet (25 mg) by mouth daily       omeprazole 20 MG tablet     90 tablet    Take 1 tablet (20 mg) by mouth daily Take 30-60 minutes before a meal.       oxyCODONE 5 MG IR tablet    ROXICODONE    30 tablet    Take 1 tablet (5 mg) by mouth every 6 hours as needed for pain       pregabalin 50 MG capsule    LYRICA     90 capsule    Take 1 capsule (50 mg) by mouth 3 times daily       vitamin D 2000 UNITS Caps     90 capsule    1 po daily       vitamin E 400 UNIT capsule     30 capsule    Take 1 capsule (400 Units) by mouth daily

## 2017-04-19 NOTE — PROGRESS NOTES
SUBJECTIVE:                                                    Gucci Logan is a 66 year old male who presents to clinic today for the following health issues:       Multiple health concerns    none    Problem list and histories reviewed & adjusted, as indicated.  Additional history: as documented         Reviewed and updated as needed this visit by clinical staff       Reviewed and updated as needed this visit by Provider          had one  Ankle injection and one knee injection    Wants lungs checked    Has zpack, not used yet    Injections 2 days ago 4-17    Now getting a little better    Felt dehydrated    Heart seems okay now        Physical Exam   Constitutional: He is oriented to person, place, and time and well-developed, well-nourished, and in no distress. No distress.   HENT:   Head: Normocephalic and atraumatic.   Right Ear: Tympanic membrane, external ear and ear canal normal.   Left Ear: Tympanic membrane, external ear and ear canal normal.   Nose: Nose normal.   Mouth/Throat: Oropharynx is clear and moist.   No sinus/ submandib tenderness     Eyes: Conjunctivae are normal.   Neck: Carotid bruit is not present.   Cardiovascular: Normal rate, regular rhythm, normal heart sounds and intact distal pulses.  Exam reveals no gallop and no friction rub.    No murmur heard.  Pulmonary/Chest: Effort normal and breath sounds normal. No respiratory distress. He has no wheezes. He has no rales.   Musculoskeletal: He exhibits no edema.   Neurological: He is alert and oriented to person, place, and time.   Skin: He is not diaphoretic.   Psychiatric: Mood and affect normal.       ASSESSMENT / PLAN:  (G62.9) Neuropathy (H)  (primary encounter diagnosis)  Comment: discussed new med.  This may be covered, maybe not.    Plan: pregabalin (LYRICA) 50 MG capsule        Patient will talk with pharmacist.     (R73.01) Impaired fasting glucose  Comment: check labs when fasting   Plan: Hemoglobin A1c            (E78.5)  Hyperlipidemia LDL goal <100  Comment: check fasting   Plan: Comprehensive metabolic panel, Lipid panel         reflex to direct LDL             (R53.83) Fatigue, unspecified type  Comment: check   Plan: TSH with free T4 reflex, CBC with platelets         differential             (J20.9) Acute bronchitis with symptoms > 10 days  Comment: hold prescription for now   Plan: azithromycin (ZITHROMAX) 250 MG tablet        Fill if symptoms worsen     (M25.50) Multiple joint pain  Comment: patient uses occasionally as needed.  Advised him to keep use to minimum.   Plan: oxyCODONE (ROXICODONE) 5 MG IR tablet        It does keep him more functional.  No side effects.     (I10) Hypertension goal BP (blood pressure) < 140/90  Comment: at goal barely   Plan: no change     (E66.9) Obesity, unspecified obesity severity, unspecified obesity type  Comment: chronic   Plan: keep working on healthy diet/exercise and wt loss        I reviewed the patient's medications, allergies, medical history, family history, and social history.    Richi Jaramillo MD

## 2017-04-19 NOTE — PATIENT INSTRUCTIONS
Increase hydration    See how much lyrica is     Keep working on healthy diet/exercise and wt loss    Only rarely use oxycodone    Schedule lab only appointment

## 2017-04-19 NOTE — NURSING NOTE
"Chief Complaint   Patient presents with     Patient Request     Health Maintenance       Initial /69 (BP Location: Left arm, Patient Position: Chair, Cuff Size: Adult Large)  Pulse 65  Temp 98.1  F (36.7  C) (Oral)  Wt 290 lb (131.5 kg)  SpO2 95%  BMI 40.45 kg/m2 Estimated body mass index is 40.45 kg/(m^2) as calculated from the following:    Height as of 1/6/17: 5' 11\" (1.803 m).    Weight as of this encounter: 290 lb (131.5 kg).  Medication Reconciliation: complete   Sofia Conde CMA      "

## 2017-04-19 NOTE — TELEPHONE ENCOUNTER
3 attempts at communication have been made with no return call.    Flagging for TC to send letter.    Heidi Wynne RN  [facility]
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
## 2017-04-20 DIAGNOSIS — R73.01 IMPAIRED FASTING GLUCOSE: ICD-10-CM

## 2017-04-20 DIAGNOSIS — R53.83 FATIGUE, UNSPECIFIED TYPE: ICD-10-CM

## 2017-04-20 DIAGNOSIS — E78.5 HYPERLIPIDEMIA LDL GOAL <100: ICD-10-CM

## 2017-04-20 LAB
ALBUMIN SERPL-MCNC: 3.6 G/DL (ref 3.4–5)
ALP SERPL-CCNC: 72 U/L (ref 40–150)
ALT SERPL W P-5'-P-CCNC: 75 U/L (ref 0–70)
ANION GAP SERPL CALCULATED.3IONS-SCNC: 9 MMOL/L (ref 3–14)
AST SERPL W P-5'-P-CCNC: 34 U/L (ref 0–45)
BASOPHILS # BLD AUTO: 0 10E9/L (ref 0–0.2)
BASOPHILS NFR BLD AUTO: 0.3 %
BILIRUB SERPL-MCNC: 1 MG/DL (ref 0.2–1.3)
BUN SERPL-MCNC: 19 MG/DL (ref 7–30)
CALCIUM SERPL-MCNC: 9.1 MG/DL (ref 8.5–10.1)
CHLORIDE SERPL-SCNC: 105 MMOL/L (ref 94–109)
CHOLEST SERPL-MCNC: 180 MG/DL
CO2 SERPL-SCNC: 27 MMOL/L (ref 20–32)
CREAT SERPL-MCNC: 0.83 MG/DL (ref 0.66–1.25)
DIFFERENTIAL METHOD BLD: ABNORMAL
EOSINOPHIL # BLD AUTO: 0.1 10E9/L (ref 0–0.7)
EOSINOPHIL NFR BLD AUTO: 0.7 %
ERYTHROCYTE [DISTWIDTH] IN BLOOD BY AUTOMATED COUNT: 13.4 % (ref 10–15)
GFR SERPL CREATININE-BSD FRML MDRD: ABNORMAL ML/MIN/1.7M2
GLUCOSE SERPL-MCNC: 209 MG/DL (ref 70–99)
HBA1C MFR BLD: 7.6 % (ref 4.3–6)
HCT VFR BLD AUTO: 45 % (ref 40–53)
HDLC SERPL-MCNC: 61 MG/DL
HGB BLD-MCNC: 15.3 G/DL (ref 13.3–17.7)
LDLC SERPL CALC-MCNC: 100 MG/DL
LYMPHOCYTES # BLD AUTO: 1 10E9/L (ref 0.8–5.3)
LYMPHOCYTES NFR BLD AUTO: 14.2 %
MCH RBC QN AUTO: 32.8 PG (ref 26.5–33)
MCHC RBC AUTO-ENTMCNC: 34 G/DL (ref 31.5–36.5)
MCV RBC AUTO: 97 FL (ref 78–100)
MONOCYTES # BLD AUTO: 0.6 10E9/L (ref 0–1.3)
MONOCYTES NFR BLD AUTO: 8.6 %
NEUTROPHILS # BLD AUTO: 5.2 10E9/L (ref 1.6–8.3)
NEUTROPHILS NFR BLD AUTO: 76.2 %
NONHDLC SERPL-MCNC: 119 MG/DL
PLATELET # BLD AUTO: 76 10E9/L (ref 150–450)
POTASSIUM SERPL-SCNC: 4.3 MMOL/L (ref 3.4–5.3)
PROT SERPL-MCNC: 7.9 G/DL (ref 6.8–8.8)
RBC # BLD AUTO: 4.66 10E12/L (ref 4.4–5.9)
SODIUM SERPL-SCNC: 141 MMOL/L (ref 133–144)
T4 FREE SERPL-MCNC: 1.1 NG/DL (ref 0.76–1.46)
TRIGL SERPL-MCNC: 95 MG/DL
TSH SERPL DL<=0.005 MIU/L-ACNC: 5.27 MU/L (ref 0.4–4)
WBC # BLD AUTO: 6.8 10E9/L (ref 4–11)

## 2017-04-20 PROCEDURE — 84439 ASSAY OF FREE THYROXINE: CPT | Performed by: FAMILY MEDICINE

## 2017-04-20 PROCEDURE — 80061 LIPID PANEL: CPT | Performed by: FAMILY MEDICINE

## 2017-04-20 PROCEDURE — 83036 HEMOGLOBIN GLYCOSYLATED A1C: CPT | Performed by: FAMILY MEDICINE

## 2017-04-20 PROCEDURE — 80053 COMPREHEN METABOLIC PANEL: CPT | Performed by: FAMILY MEDICINE

## 2017-04-20 PROCEDURE — 36415 COLL VENOUS BLD VENIPUNCTURE: CPT | Performed by: FAMILY MEDICINE

## 2017-04-20 PROCEDURE — 84443 ASSAY THYROID STIM HORMONE: CPT | Performed by: FAMILY MEDICINE

## 2017-04-20 PROCEDURE — 85025 COMPLETE CBC W/AUTO DIFF WBC: CPT | Performed by: FAMILY MEDICINE

## 2017-04-20 NOTE — LETTER
[facility]  [address] [place]  [address]  [phone]        April 24, 2017    Gucci Logan  [address] [facility] [address]        Dear Gucci,    The diabetes test ( hemoglobin a1c ) is quite a bit higher than last time.  We could consider starting a medication for this, but it is not mandatory.  An alternative would be to work really hard on healthy diet/ exercise/ weight loss and then recheck this lab in 3-4 months.     One thyroid test ( TSH ) is a bit off, but the follow up test Free T4 is normal, so no thyroid medication needed.     Other labs are okay.     Results for orders placed or performed in visit on 04/20/17   Hemoglobin A1c   Result Value Ref Range    Hemoglobin A1C 7.6 (H) 4.3 - 6.0 %   Comprehensive metabolic panel   Result Value Ref Range    Sodium 141 133 - 144 mmol/L    Potassium 4.3 3.4 - 5.3 mmol/L    Chloride 105 94 - 109 mmol/L    Carbon Dioxide 27 20 - 32 mmol/L    Anion Gap 9 3 - 14 mmol/L    Glucose 209 (H) 70 - 99 mg/dL    Urea Nitrogen 19 7 - 30 mg/dL    Creatinine 0.83 0.66 - 1.25 mg/dL    GFR Estimate >90  Non  GFR Calc   >60 mL/min/1.7m2    GFR Estimate If Black >90   GFR Calc   >60 mL/min/1.7m2    Calcium 9.1 8.5 - 10.1 mg/dL    Bilirubin Total 1.0 0.2 - 1.3 mg/dL    Albumin 3.6 3.4 - 5.0 g/dL    Protein Total 7.9 6.8 - 8.8 g/dL    Alkaline Phosphatase 72 40 - 150 U/L    ALT 75 (H) 0 - 70 U/L    AST 34 0 - 45 U/L   Lipid panel reflex to direct LDL   Result Value Ref Range    Cholesterol 180 <200 mg/dL    Triglycerides 95 <150 mg/dL    HDL Cholesterol 61 >39 mg/dL    LDL Cholesterol Calculated 100 (H) <100 mg/dL    Non HDL Cholesterol 119 <130 mg/dL   TSH with free T4 reflex   Result Value Ref Range    TSH 5.27 (H) 0.40 - 4.00 mU/L   CBC with platelets differential   Result Value Ref Range    WBC 6.8 4.0 - 11.0 10e9/L    RBC Count 4.66 4.4 - 5.9 10e12/L    Hemoglobin 15.3 13.3 - 17.7 g/dL     Hematocrit 45.0 40.0 - 53.0 %    MCV 97 78 - 100 fl    MCH 32.8 26.5 - 33.0 pg    MCHC 34.0 31.5 - 36.5 g/dL    RDW 13.4 10.0 - 15.0 %    Platelet Count 76 (L) 150 - 450 10e9/L    Diff Method Automated Method     % Neutrophils 76.2 %    % Lymphocytes 14.2 %    % Monocytes 8.6 %    % Eosinophils 0.7 %    % Basophils 0.3 %    Absolute Neutrophil 5.2 1.6 - 8.3 10e9/L    Absolute Lymphocytes 1.0 0.8 - 5.3 10e9/L    Absolute Monocytes 0.6 0.0 - 1.3 10e9/L    Absolute Eosinophils 0.1 0.0 - 0.7 10e9/L    Absolute Basophils 0.0 0.0 - 0.2 10e9/L   T4 free   Result Value Ref Range    T4 Free 1.10 0.76 - 1.46 ng/dL       If you have any questions please call the clinic at [phone].    Sincerely,    Rihci Jaramillo MD  [facility]

## 2017-04-23 NOTE — PROGRESS NOTES
The diabetes test ( hemoglobin a1c ) is quite a bit higher than last time.  We could consider starting a medication for this, but it is not mandatory.  An alternative would be to work really hard on healthy diet/ exercise/ weight loss and then recheck this lab in 3-4 months.    One thyroid test ( TSH ) is a bit off, but the follow up test Free T4 is normal, so no thyroid medication needed.    Other labs are okay.    Richi Jaramillo MD

## 2017-07-05 ENCOUNTER — TELEPHONE (OUTPATIENT)
Dept: FAMILY MEDICINE | Facility: CLINIC | Age: 67
End: 2017-07-05

## 2017-07-05 NOTE — TELEPHONE ENCOUNTER
Patient states: 12/1 heart jumped different - ekg - added additional heart medication back then.  Heart jumped again on Sunday and its beating differently now, then Monday afternoon while mowing a elva nail was thrown up and imbedded in R cheek 2 inches below eye, patient pulled nail out of cheek, thinks may have hit cheek bone, cheek is swollen with a blue trevor on cheek, admits had tetanus shot in September of 2016 after a dog bite.  Pharmacy [facility], cued.    Routing to PCP to review and advise.    Tika Mistry RN  [facility]

## 2017-07-05 NOTE — TELEPHONE ENCOUNTER
Called patient and scheduled apt for 9:20 am Thursday.    Tika Mistry RN  [facility]

## 2017-07-05 NOTE — TELEPHONE ENCOUNTER
Reason for Call:  Same Day Appointment, Requested Provider:  Richi Jaramillo MD    PCP: Richi Jaramillo    Reason for visit:  Has appointment Friday 7-7 would like to be seen sooner     Duration of symptoms:  Nail puncture and about heart jump    Have you been treated for this in the past? Yes    Additional comments:  no    Can we leave a detailed message on this number? YES    Phone number patient can be reached at: Home number on file [phone] (home)    Best Time:  Anytime    Call taken on 7/5/2017 at 9:29 AM by Natalia Sainz

## 2017-07-06 ENCOUNTER — OFFICE VISIT (OUTPATIENT)
Dept: FAMILY MEDICINE | Facility: CLINIC | Age: 67
End: 2017-07-06
Payer: COMMERCIAL

## 2017-07-06 VITALS
TEMPERATURE: 97.9 F | WEIGHT: 289.75 LBS | BODY MASS INDEX: 40.41 KG/M2 | SYSTOLIC BLOOD PRESSURE: 136 MMHG | OXYGEN SATURATION: 95 % | DIASTOLIC BLOOD PRESSURE: 75 MMHG | HEART RATE: 67 BPM

## 2017-07-06 DIAGNOSIS — F41.9 ANXIETY: ICD-10-CM

## 2017-07-06 DIAGNOSIS — L08.9 FACIAL INFECTION: Primary | ICD-10-CM

## 2017-07-06 DIAGNOSIS — I10 ESSENTIAL HYPERTENSION WITH GOAL BLOOD PRESSURE LESS THAN 140/90: ICD-10-CM

## 2017-07-06 DIAGNOSIS — R60.0 BILATERAL LOWER EXTREMITY EDEMA: ICD-10-CM

## 2017-07-06 DIAGNOSIS — I10 HYPERTENSION GOAL BP (BLOOD PRESSURE) < 140/90: ICD-10-CM

## 2017-07-06 DIAGNOSIS — K21.9 GASTROESOPHAGEAL REFLUX DISEASE WITHOUT ESOPHAGITIS: ICD-10-CM

## 2017-07-06 PROCEDURE — 99213 OFFICE O/P EST LOW 20 MIN: CPT | Performed by: FAMILY MEDICINE

## 2017-07-06 RX ORDER — CEPHALEXIN 500 MG/1
500 CAPSULE ORAL 4 TIMES DAILY
Qty: 28 CAPSULE | Refills: 0 | Status: SHIPPED | OUTPATIENT
Start: 2017-07-06 | End: 2017-07-13

## 2017-07-06 RX ORDER — DIAZEPAM 5 MG
TABLET ORAL
Qty: 6 TABLET | Refills: 0 | Status: SHIPPED | OUTPATIENT
Start: 2017-07-06 | End: 2017-08-18

## 2017-07-06 ASSESSMENT — PAIN SCALES - GENERAL: PAINLEVEL: NO PAIN (0)

## 2017-07-06 NOTE — PATIENT INSTRUCTIONS
Take the antibiotics for a week    Increase exercise/ walking    Only use the diazepam very sparingly     Follow up as needed based on symptoms

## 2017-07-06 NOTE — TELEPHONE ENCOUNTER
amLODIPine (NORVASC) 2.5 MG tablet      Last Written Prescription Date: 4/10/17  Last Fill Quantity: 90, # refills: 0    Last Office Visit with [facility], [facility] or [facility] prescribing provider:  7/6/17   Future Office Visit:        BP Readings from Last 3 Encounters:   07/06/17 136/75   04/19/17 137/69   02/16/17 134/70     losartan (COZAAR) 100 MG tablet      Last Written Prescription Date: 12/2/16  Last Fill Quantity: 90, # refills: 1  Last Office Visit with [facility], [facility] or [facility] prescribing provider: 7/6/17       Potassium   Date Value Ref Range Status   04/20/2017 4.3 3.4 - 5.3 mmol/L Final     Creatinine   Date Value Ref Range Status   04/20/2017 0.83 0.66 - 1.25 mg/dL Final     BP Readings from Last 3 Encounters:   07/06/17 136/75   04/19/17 137/69   02/16/17 134/70     omeprazole 20 MG tablet      Last Written Prescription Date: 7/18/16  Last Fill Quantity: 90,  # refills: 3   Last Office Visit with [facility], [facility] or [facility] prescribing provider: 7/6/17

## 2017-07-06 NOTE — MR AVS SNAPSHOT
"              After Visit Summary   7/6/2017    Gucci Logan    MRN: [id number]           Patient Information     Date Of Birth          1950        Visit Information        Provider Department      7/6/2017 9:20 AM Richi Jaramillo MD [facility]        Today's Diagnoses     Facial infection    -  1    Anxiety          Care Instructions    Take the antibiotics for a week    Increase exercise/ walking    Only use the diazepam very sparingly     Follow up as needed based on symptoms           Follow-ups after your visit        Who to contact     If you have questions or need follow up information about today's clinic visit or your schedule please contact [facility] directly at [phone].  Normal or non-critical lab and imaging results will be communicated to you by MyChart, letter or phone within 4 business days after the clinic has received the results. If you do not hear from us within 7 days, please contact the clinic through MyChart or phone. If you have a critical or abnormal lab result, we will notify you by phone as soon as possible.  Submit refill requests through [employer] or call your pharmacy and they will forward the refill request to us. Please allow 3 business days for your refill to be completed.          Additional Information About Your Visit        MyChart Information     [employer] lets you send messages to your doctor, view your test results, renew your prescriptions, schedule appointments and more. To sign up, go to [email or web address] . Click on \"Log in\" on the left side of the screen, which will take you to the Welcome page. Then click on \"Sign up Now\" on the right side of the page.     You will be asked to enter the access code listed below, as well as some personal information. Please follow the directions to create your username and password.     Your access code is: [email or web address]  Expires: 7/18/2017 10:37 AM     Your access code " will  in 90 days. If you need help or a new code, please call your [facility] or [phone].        Care EveryWhere ID     This is your Care EveryWhere ID. This could be used by other organizations to access your [facility] medical records  [id number]        Your Vitals Were     Pulse Temperature Pulse Oximetry BMI (Body Mass Index)          67 97.9  F (36.6  C) (Oral) 95% 40.41 kg/m2         Blood Pressure from Last 3 Encounters:   17 136/75   17 137/69   17 134/70    Weight from Last 3 Encounters:   17 289 lb 12 oz (131.4 kg)   17 290 lb (131.5 kg)   17 289 lb 8 oz (131.3 kg)              Today, you had the following     No orders found for display         Today's Medication Changes          These changes are accurate as of: 17 10:12 AM.  If you have any questions, ask your nurse or doctor.               Start taking these medicines.        Dose/Directions    cephALEXin 500 MG capsule   Commonly known as:  KEFLEX   Used for:  Facial infection   Started by:  Richi Jaramillo MD        Dose:  500 mg   Take 1 capsule (500 mg) by mouth 4 times daily for 7 days   Quantity:  28 capsule   Refills:  0       diazepam 5 MG tablet   Commonly known as:  VALIUM   Used for:  Anxiety   Started by:  Richi Jaramillo MD        1/2 to 1 po every 6 hours as needed for anxiety   Quantity:  6 tablet   Refills:  0            Where to get your medicines      These medications were sent to [facility] [place] - [address] [place]  [address], [place] [address]     Phone:  [phone]     cephALEXin 500 MG capsule         Some of these will need a paper prescription and others can be bought over the counter.  Ask your nurse if you have questions.     Bring a paper prescription for each of these medications     diazepam 5 MG tablet                Primary Care Provider Office Phone # Fax #    Richi Jaramillo [phone]        [facility] [address]  [facility] [address]        Equal Access to Services     ARTHUR FERNANDEZ : Hadii aad ku hadandrewnando Sodorothyali, waaxda luqadaha, qaybta kaalmada myronkvngkwesi, shannan bettencourtjessicashonda singh. So [facility] [phone].    ATENCIÓN: Si habla español, tiene a khoury disposición servicios gratuitos de asistencia lingüística. Llame al [phone].    We comply with applicable federal civil rights laws and [place] laws. We do not discriminate on the basis of race, color, national origin, age, disability sex, sexual orientation or gender identity.            Thank you!     Thank you for choosing [facility]  for your care. Our goal is always to provide you with excellent care. Hearing back from our patients is one way we can continue to improve our services. Please take a few minutes to complete the written survey that you may receive in the mail after your visit with us. Thank you!             Your Updated Medication List - Protect others around you: Learn how to safely use, store and throw away your medicines at www.disposemymeds.org.          This list is accurate as of: 7/6/17 10:12 AM.  Always use your most recent med list.                   Brand Name Dispense Instructions for use Diagnosis    amLODIPine 2.5 MG tablet    NORVASC    90 tablet    TAKE ONE TABLET BY MOUTH ONCE DAILY    Essential hypertension with goal blood pressure less than 140/90       biotin 2.5 mg/mL Susp      Take by mouth daily        cephALEXin 500 MG capsule    KEFLEX    28 capsule    Take 1 capsule (500 mg) by mouth 4 times daily for 7 days    Facial infection       cyclobenzaprine 10 MG tablet    FLEXERIL    90 tablet    Take 1 tablet (10 mg) by mouth 3 times daily as needed for muscle spasms    Muscle pain       diazepam 5 MG tablet    VALIUM    6 tablet    1/2 to 1 po every 6 hours as needed for anxiety    Anxiety       glutamine 500 MG capsule      Take 1 capsule (500 mg)  by mouth daily        hydrochlorothiazide 25 MG tablet    HYDRODIURIL    90 tablet    TAKE ONE TABLET BY MOUTH ONCE DAILY    Bilateral lower extremity edema       losartan 100 MG tablet    COZAAR    90 tablet    Take 1 tablet (100 mg) by mouth daily    Hypertension goal BP (blood pressure) < 140/90       metoprolol 25 MG 24 hr tablet    TOPROL-XL    90 tablet    Take 1 tablet (25 mg) by mouth daily    Hypertension goal BP (blood pressure) < 140/90       omeprazole 20 MG tablet     90 tablet    Take 1 tablet (20 mg) by mouth daily Take 30-60 minutes before a meal.    Gastroesophageal reflux disease without esophagitis       oxyCODONE 5 MG IR tablet    ROXICODONE    30 tablet    Take 1 tablet (5 mg) by mouth every 6 hours as needed for pain    Multiple joint pain       pregabalin 50 MG capsule    LYRICA    90 capsule    Take 1 capsule (50 mg) by mouth 3 times daily    Neuropathy (H)       vitamin D 2000 UNITS Caps     90 capsule    1 po daily    Vitamin D deficiency disease       vitamin E 400 UNIT capsule     30 capsule    Take 1 capsule (400 Units) by mouth daily

## 2017-07-06 NOTE — PROGRESS NOTES
SUBJECTIVE:                                                    Gucci Logan is a 67 year old male who presents to clinic today for the following health issues:       Puncture wound right side of face  Heart jumped again    none    Problem list and histories reviewed & adjusted, as indicated.  Additional history: as documented         Reviewed and updated as needed this visit by clinical staff       Reviewed and updated as needed this visit by Provider          4 days ago cutting grass, lawnmower picked up the nail and it went up to face     Full physical not done     Mentation and affect are fine    No tremor of speech or extremity    Patient has tiny scab present on right cheek area.  Very small amount of redness around the puncture wound.  Moderately tender.      No drainage, no abscess    Oral mucosa normal    No sinus tenderness    Sclera/ conj clear    No submandib tenderness    Cranial nerves fine    He showed me the nail     ASSESSMENT / PLAN:  (L08.9) Facial infection  (primary encounter diagnosis)  Comment: will cover with antibiotics.    Plan: cephALEXin (KEFLEX) 500 MG capsule        Follow up if symptoms not resolving     (F41.9) Anxiety  Comment: lots of stress recently with a tenant that had to be evicted.  Police were even involved.  Plan: diazepam (VALIUM) 5 MG tablet        Patient will have these on hand but only use very sparingly     (I10) Hypertension goal BP (blood pressure) < 140/90  Comment: high even on recheck   Plan: same meds, monitor blood pressure       I reviewed the patient's medications, allergies, medical history, family history, and social history.    Richi Jaramillo MD

## 2017-07-06 NOTE — NURSING NOTE
"Chief Complaint   Patient presents with     Puncture Wound     Health Maintenance       Initial /80 (BP Location: Right arm, Patient Position: Chair, Cuff Size: Adult Large)  Pulse 67  Temp 97.9  F (36.6  C) (Oral)  Wt 289 lb 12 oz (131.4 kg)  SpO2 95%  BMI 40.41 kg/m2 Estimated body mass index is 40.41 kg/(m^2) as calculated from the following:    Height as of 1/6/17: 5' 11\" (1.803 m).    Weight as of this encounter: 289 lb 12 oz (131.4 kg).  Medication Reconciliation: complete   Sofia Conde CMA      "

## 2017-07-07 RX ORDER — LOSARTAN POTASSIUM 100 MG/1
TABLET ORAL
Qty: 90 TABLET | Refills: 0 | Status: SHIPPED | OUTPATIENT
Start: 2017-07-07 | End: 2017-10-11

## 2017-07-07 RX ORDER — HYDROCHLOROTHIAZIDE 25 MG/1
TABLET ORAL
Qty: 90 TABLET | Refills: 0 | Status: SHIPPED | OUTPATIENT
Start: 2017-07-07 | End: 2017-10-11

## 2017-07-07 RX ORDER — AMLODIPINE BESYLATE 2.5 MG/1
TABLET ORAL
Qty: 90 TABLET | Refills: 0 | Status: SHIPPED | OUTPATIENT
Start: 2017-07-07 | End: 2017-10-11

## 2017-07-07 NOTE — TELEPHONE ENCOUNTER
Prescription approved per [facility] Refill Protocol.     Jessica Mast RN

## 2017-07-07 NOTE — TELEPHONE ENCOUNTER
hydrochlorothiazide (HYDRODIURIL) 25 MG tablet      Last Written Prescription Date: 4-10-17  Last Fill Quantity: 90, # refills: 0  Last Office Visit with [facility], P or [facility] prescribing provider: 7-6-17       Potassium   Date Value Ref Range Status   04/20/2017 4.3 3.4 - 5.3 mmol/L Final     Creatinine   Date Value Ref Range Status   04/20/2017 0.83 0.66 - 1.25 mg/dL Final     BP Readings from Last 3 Encounters:   07/06/17 136/75   04/19/17 137/69   02/16/17 134/70

## 2017-07-07 NOTE — TELEPHONE ENCOUNTER
Prescription approved per [facility] Refill Protocol.     Jessica Mast RN

## 2017-07-10 ENCOUNTER — TELEPHONE (OUTPATIENT)
Dept: FAMILY MEDICINE | Facility: CLINIC | Age: 67
End: 2017-07-10

## 2017-07-10 NOTE — TELEPHONE ENCOUNTER
PA is needed for the medication, Diazepam 5mg.     Insurance has been called.  Alternatives that are covered are: I never did the PA for this medication, not sure who did, but PA was done for Diazepam and a denial letter was sent to us. It states coverage is provided in situations where lorazepam, oxazepam, or temazepam is being used for a diagnosis of Insomnia. Please advise.        Would you like to start PA or Rx alternative medication?    If PA, please list all medications this patient has tried along with any contraindications that may have been experienced in the past. Thank you.    Pharmacy: [facility]  Phone: [phone]    Insurance Plan: [facility]  Phone: renae  Pt ID: [id number]   Group: [id number]     Forwarded to PCP for review.

## 2017-07-10 NOTE — TELEPHONE ENCOUNTER
Patient only uses this very sparingly.   It is generic.      Please inform patient that he will have to pay out of pocket for this as insurance will not cover.    Thanks.    Richi Jaramillo MD

## 2017-07-11 DIAGNOSIS — M79.10 MUSCLE PAIN: ICD-10-CM

## 2017-07-11 NOTE — TELEPHONE ENCOUNTER
cyclobenzaprine (FLEXERIL) 10 MG tablet      Last Written Prescription Date:  1-6-17  Last Fill Quantity: 90,   # refills: 0  [facility] or [facility] prescribing provider: 7-6-17  Future Office visit:       Routing refill request to provider for review/approval because:  Drug not on the [facility], [facility] or [facility] refill protocol or controlled substance

## 2017-07-12 DIAGNOSIS — I10 HYPERTENSION GOAL BP (BLOOD PRESSURE) < 140/90: ICD-10-CM

## 2017-07-12 RX ORDER — METOPROLOL SUCCINATE 25 MG/1
TABLET, EXTENDED RELEASE ORAL
Qty: 90 TABLET | Refills: 0 | Status: SHIPPED | OUTPATIENT
Start: 2017-07-12 | End: 2017-10-11

## 2017-07-12 RX ORDER — CYCLOBENZAPRINE HCL 10 MG
10 TABLET ORAL 3 TIMES DAILY PRN
Qty: 90 TABLET | Refills: 0 | Status: SHIPPED | OUTPATIENT
Start: 2017-07-12 | End: 2017-08-18

## 2017-07-12 NOTE — TELEPHONE ENCOUNTER
Prescription approved per [facility] Refill Protocol.     Jessica Mast RN

## 2017-07-12 NOTE — TELEPHONE ENCOUNTER
metoprolol (TOPROL-XL) 25 MG 24 hr tablet      Last Written Prescription Date: 2/16/17  Last Fill Quantity: 90, # refills: 1    Last Office Visit with [facility] or [facility] prescribing provider:  7/6/17   Future Office Visit:        BP Readings from Last 3 Encounters:   07/06/17 136/75   04/19/17 137/69   02/16/17 134/70

## 2017-08-18 ENCOUNTER — OFFICE VISIT (OUTPATIENT)
Dept: FAMILY MEDICINE | Facility: CLINIC | Age: 67
End: 2017-08-18
Payer: COMMERCIAL

## 2017-08-18 VITALS
SYSTOLIC BLOOD PRESSURE: 133 MMHG | DIASTOLIC BLOOD PRESSURE: 68 MMHG | OXYGEN SATURATION: 94 % | TEMPERATURE: 97 F | BODY MASS INDEX: 40.03 KG/M2 | WEIGHT: 287 LBS | HEART RATE: 67 BPM

## 2017-08-18 DIAGNOSIS — I10 HYPERTENSION GOAL BP (BLOOD PRESSURE) < 140/90: ICD-10-CM

## 2017-08-18 DIAGNOSIS — M25.561 RIGHT KNEE PAIN, UNSPECIFIED CHRONICITY: ICD-10-CM

## 2017-08-18 DIAGNOSIS — R73.01 IMPAIRED FASTING GLUCOSE: ICD-10-CM

## 2017-08-18 DIAGNOSIS — M25.512 LEFT SHOULDER PAIN, UNSPECIFIED CHRONICITY: ICD-10-CM

## 2017-08-18 DIAGNOSIS — F41.9 ANXIETY: ICD-10-CM

## 2017-08-18 DIAGNOSIS — R53.83 FATIGUE, UNSPECIFIED TYPE: Primary | ICD-10-CM

## 2017-08-18 DIAGNOSIS — E66.9 OBESITY, UNSPECIFIED OBESITY SEVERITY, UNSPECIFIED OBESITY TYPE: ICD-10-CM

## 2017-08-18 DIAGNOSIS — R79.89 ELEVATED LFTS: ICD-10-CM

## 2017-08-18 DIAGNOSIS — M79.10 MUSCLE PAIN: ICD-10-CM

## 2017-08-18 LAB
ALBUMIN SERPL-MCNC: 3.5 G/DL (ref 3.4–5)
ALP SERPL-CCNC: 64 U/L (ref 40–150)
ALT SERPL W P-5'-P-CCNC: 77 U/L (ref 0–70)
ANION GAP SERPL CALCULATED.3IONS-SCNC: 4 MMOL/L (ref 3–14)
AST SERPL W P-5'-P-CCNC: 58 U/L (ref 0–45)
BASOPHILS # BLD AUTO: 0 10E9/L (ref 0–0.2)
BASOPHILS NFR BLD AUTO: 0.6 %
BILIRUB SERPL-MCNC: 1 MG/DL (ref 0.2–1.3)
BUN SERPL-MCNC: 13 MG/DL (ref 7–30)
CALCIUM SERPL-MCNC: 8.4 MG/DL (ref 8.5–10.1)
CHLORIDE SERPL-SCNC: 106 MMOL/L (ref 94–109)
CO2 SERPL-SCNC: 30 MMOL/L (ref 20–32)
CREAT SERPL-MCNC: 1.03 MG/DL (ref 0.66–1.25)
DIFFERENTIAL METHOD BLD: ABNORMAL
EOSINOPHIL # BLD AUTO: 0.1 10E9/L (ref 0–0.7)
EOSINOPHIL NFR BLD AUTO: 2.8 %
ERYTHROCYTE [DISTWIDTH] IN BLOOD BY AUTOMATED COUNT: 13.4 % (ref 10–15)
GFR SERPL CREATININE-BSD FRML MDRD: 72 ML/MIN/1.7M2
GLUCOSE SERPL-MCNC: 166 MG/DL (ref 70–99)
HBA1C MFR BLD: 7.3 % (ref 4.3–6)
HCT VFR BLD AUTO: 43.6 % (ref 40–53)
HGB BLD-MCNC: 14.9 G/DL (ref 13.3–17.7)
LYMPHOCYTES # BLD AUTO: 0.9 10E9/L (ref 0.8–5.3)
LYMPHOCYTES NFR BLD AUTO: 20.1 %
MCH RBC QN AUTO: 33.7 PG (ref 26.5–33)
MCHC RBC AUTO-ENTMCNC: 34.2 G/DL (ref 31.5–36.5)
MCV RBC AUTO: 99 FL (ref 78–100)
MONOCYTES # BLD AUTO: 0.6 10E9/L (ref 0–1.3)
MONOCYTES NFR BLD AUTO: 11.9 %
NEUTROPHILS # BLD AUTO: 3 10E9/L (ref 1.6–8.3)
NEUTROPHILS NFR BLD AUTO: 64.6 %
PLATELET # BLD AUTO: 81 10E9/L (ref 150–450)
POTASSIUM SERPL-SCNC: 3.7 MMOL/L (ref 3.4–5.3)
PROT SERPL-MCNC: 7.2 G/DL (ref 6.8–8.8)
RBC # BLD AUTO: 4.42 10E12/L (ref 4.4–5.9)
SODIUM SERPL-SCNC: 140 MMOL/L (ref 133–144)
T4 FREE SERPL-MCNC: 1 NG/DL (ref 0.76–1.46)
TSH SERPL DL<=0.005 MIU/L-ACNC: 4.24 MU/L (ref 0.4–4)
WBC # BLD AUTO: 4.6 10E9/L (ref 4–11)

## 2017-08-18 PROCEDURE — 36415 COLL VENOUS BLD VENIPUNCTURE: CPT | Performed by: FAMILY MEDICINE

## 2017-08-18 PROCEDURE — 83036 HEMOGLOBIN GLYCOSYLATED A1C: CPT | Performed by: FAMILY MEDICINE

## 2017-08-18 PROCEDURE — 80050 GENERAL HEALTH PANEL: CPT | Performed by: FAMILY MEDICINE

## 2017-08-18 PROCEDURE — 84439 ASSAY OF FREE THYROXINE: CPT | Performed by: FAMILY MEDICINE

## 2017-08-18 PROCEDURE — 99214 OFFICE O/P EST MOD 30 MIN: CPT | Performed by: FAMILY MEDICINE

## 2017-08-18 RX ORDER — HYDROCODONE BITARTRATE AND ACETAMINOPHEN 5; 325 MG/1; MG/1
1 TABLET ORAL EVERY 6 HOURS PRN
Qty: 30 TABLET | Refills: 0 | Status: SHIPPED | OUTPATIENT
Start: 2017-08-18 | End: 2017-10-11

## 2017-08-18 RX ORDER — CYCLOBENZAPRINE HCL 10 MG
10 TABLET ORAL 3 TIMES DAILY PRN
Qty: 90 TABLET | Refills: 0 | Status: SHIPPED | OUTPATIENT
Start: 2017-08-18 | End: 2017-10-11

## 2017-08-18 RX ORDER — DIAZEPAM 5 MG
TABLET ORAL
Qty: 10 TABLET | Refills: 0 | Status: SHIPPED | OUTPATIENT
Start: 2017-08-18 | End: 2017-10-11

## 2017-08-18 NOTE — LETTER
[facility]  [address] [place]  [address]  [phone]        August 21, 2017    Gucci Logan  [address] [facility] [address]        Dear Gucci,    The thyroid tests are similar to last time.  Since the free T4 is normal, no need for thyroid medication.     The diabetes test ( hemoglobin a1c ) is a bit better, but still high.  No medication needed. Just keep working on healthy diet/exercise and weight loss.     Other labs are all stable.     Results for orders placed or performed in visit on 08/18/17   Hemoglobin A1c   Result Value Ref Range    Hemoglobin A1C 7.3 (H) 4.3 - 6.0 %   Comprehensive metabolic panel   Result Value Ref Range    Sodium 140 133 - 144 mmol/L    Potassium 3.7 3.4 - 5.3 mmol/L    Chloride 106 94 - 109 mmol/L    Carbon Dioxide 30 20 - 32 mmol/L    Anion Gap 4 3 - 14 mmol/L    Glucose 166 (H) 70 - 99 mg/dL    Urea Nitrogen 13 7 - 30 mg/dL    Creatinine 1.03 0.66 - 1.25 mg/dL    GFR Estimate 72 >60 mL/min/1.7m2    GFR Estimate If Black 87 >60 mL/min/1.7m2    Calcium 8.4 (L) 8.5 - 10.1 mg/dL    Bilirubin Total 1.0 0.2 - 1.3 mg/dL    Albumin 3.5 3.4 - 5.0 g/dL    Protein Total 7.2 6.8 - 8.8 g/dL    Alkaline Phosphatase 64 40 - 150 U/L    ALT 77 (H) 0 - 70 U/L    AST 58 (H) 0 - 45 U/L   TSH with free T4 reflex   Result Value Ref Range    TSH 4.24 (H) 0.40 - 4.00 mU/L   CBC with platelets differential   Result Value Ref Range    WBC 4.6 4.0 - 11.0 10e9/L    RBC Count 4.42 4.4 - 5.9 10e12/L    Hemoglobin 14.9 13.3 - 17.7 g/dL    Hematocrit 43.6 40.0 - 53.0 %    MCV 99 78 - 100 fl    MCH 33.7 (H) 26.5 - 33.0 pg    MCHC 34.2 31.5 - 36.5 g/dL    RDW 13.4 10.0 - 15.0 %    Platelet Count 81 (L) 150 - 450 10e9/L    Diff Method Automated Method     % Neutrophils 64.6 %    % Lymphocytes 20.1 %    % Monocytes 11.9 %    % Eosinophils 2.8 %    % Basophils 0.6 %    Absolute Neutrophil 3.0 1.6 - 8.3 10e9/L    Absolute Lymphocytes 0.9 0.8 - 5.3 10e9/L     Absolute Monocytes 0.6 0.0 - 1.3 10e9/L    Absolute Eosinophils 0.1 0.0 - 0.7 10e9/L    Absolute Basophils 0.0 0.0 - 0.2 10e9/L   T4 free   Result Value Ref Range    T4 Free 1.00 0.76 - 1.46 ng/dL       If you have any questions please call the clinic at [phone].    Sincerely,    Richi RAMOSL

## 2017-08-18 NOTE — PATIENT INSTRUCTIONS
We will send you lab results    Keep working on healthy diet/exercise and wt loss    Be careful of tylenol (acetaminophen)

## 2017-08-18 NOTE — MR AVS SNAPSHOT
"              After Visit Summary   8/18/2017    Gucci Logan    MRN: [id number]           Patient Information     Date Of Birth          1950        Visit Information        Provider Department      8/18/2017 8:00 AM Richi Jaramillo MD [facility]        Today's Diagnoses     Fatigue, unspecified type    -  1    Left shoulder pain, unspecified chronicity        Right knee pain, unspecified chronicity        Anxiety        Impaired fasting glucose          Care Instructions    We will send you lab results    Keep working on healthy diet/exercise and wt loss    Be careful of tylenol (acetaminophen)          Follow-ups after your visit        Who to contact     If you have questions or need follow up information about today's clinic visit or your schedule please contact [facility] directly at [phone].  Normal or non-critical lab and imaging results will be communicated to you by MyChart, letter or phone within 4 business days after the clinic has received the results. If you do not hear from us within 7 days, please contact the clinic through MyChart or phone. If you have a critical or abnormal lab result, we will notify you by phone as soon as possible.  Submit refill requests through [employer] or call your pharmacy and they will forward the refill request to us. Please allow 3 business days for your refill to be completed.          Additional Information About Your Visit        [facility] Medical Information     [employer] lets you send messages to your doctor, view your test results, renew your prescriptions, schedule appointments and more. To sign up, go to [email or web address] . Click on \"Log in\" on the left side of the screen, which will take you to the Welcome page. Then click on \"Sign up Now\" on the right side of the page.     You will be asked to enter the access code listed below, as well as some personal information. Please follow the directions to create " your username and password.     Your access code is: [email or web address]  Expires: 2017  8:28 AM     Your access code will  in 90 days. If you need help or a new code, please call your [facility] or [phone].        Care EveryWhere ID     This is your Care EveryWhere ID. This could be used by other organizations to access your [facility] medical records  [id number]        Your Vitals Were     Pulse Temperature Pulse Oximetry BMI (Body Mass Index)          67 97  F (36.1  C) (Oral) 94% 40.03 kg/m2         Blood Pressure from Last 3 Encounters:   17 133/68   17 136/75   17 137/69    Weight from Last 3 Encounters:   17 287 lb (130.2 kg)   17 289 lb 12 oz (131.4 kg)   17 290 lb (131.5 kg)              We Performed the Following     CBC with platelets differential     Comprehensive metabolic panel     Hemoglobin A1c     TSH with free T4 reflex          Today's Medication Changes          These changes are accurate as of: 17  8:28 AM.  If you have any questions, ask your nurse or doctor.               Start taking these medicines.        Dose/Directions    HYDROcodone-acetaminophen 5-325 MG per tablet   Commonly known as:  NORCO   Used for:  Left shoulder pain, unspecified chronicity, Right knee pain, unspecified chronicity   Started by:  Richi Jaramillo MD        Dose:  1 tablet   Take 1 tablet by mouth every 6 hours as needed for moderate to severe pain   Quantity:  30 tablet   Refills:  0         Stop taking these medicines if you haven't already. Please contact your care team if you have questions.     oxyCODONE 5 MG IR tablet   Commonly known as:  ROXICODONE   Stopped by:  Richi Jaramillo MD                Where to get your medicines      Some of these will need a paper prescription and others can be bought over the counter.  Ask your nurse if you have questions.     Bring a paper prescription for each of these medications     diazepam 5 MG tablet     HYDROcodone-acetaminophen 5-325 MG per tablet                Primary Care Provider Office Phone # Fax #    Richi Jaramillo [phone]       [address] [facility] [address]        Equal Access to Services     ARTHUR FERNANDEZ : Alivia gonzalo nelson nayan Sosylvester, waaxda luqadaha, qaybta kaalmada adenafisayada, shannan medrano laTanamoises singh. So [facility] [phone].    ATENCIÓN: Si habla español, tiene a khoury disposición servicios gratuitos de asistencia lingüística. Llame al [phone].    We comply with applicable federal civil rights laws and [place] laws. We do not discriminate on the basis of race, color, national origin, age, disability sex, sexual orientation or gender identity.            Thank you!     Thank you for choosing [facility]  for your care. Our goal is always to provide you with excellent care. Hearing back from our patients is one way we can continue to improve our services. Please take a few minutes to complete the written survey that you may receive in the mail after your visit with us. Thank you!             Your Updated Medication List - Protect others around you: Learn how to safely use, store and throw away your medicines at www.disposemymeds.org.          This list is accurate as of: 8/18/17  8:28 AM.  Always use your most recent med list.                   Brand Name Dispense Instructions for use Diagnosis    amLODIPine 2.5 MG tablet    NORVASC    90 tablet    TAKE ONE TABLET BY MOUTH ONCE DAILY    Essential hypertension with goal blood pressure less than 140/90       biotin 2.5 mg/mL Susp      Take by mouth daily        cyclobenzaprine 10 MG tablet    FLEXERIL    90 tablet    Take 1 tablet (10 mg) by mouth 3 times daily as needed for muscle spasms    Muscle pain       diazepam 5 MG tablet    VALIUM    10 tablet    1/2 to 1 po every 6 hours as needed for anxiety    Anxiety       glutamine 500 MG capsule      Take 1 capsule (500 mg) by  mouth daily        hydrochlorothiazide 25 MG tablet    HYDRODIURIL    90 tablet    TAKE ONE TABLET BY MOUTH ONCE DAILY    Bilateral lower extremity edema       HYDROcodone-acetaminophen 5-325 MG per tablet    NORCO    30 tablet    Take 1 tablet by mouth every 6 hours as needed for moderate to severe pain    Left shoulder pain, unspecified chronicity, Right knee pain, unspecified chronicity       losartan 100 MG tablet    COZAAR    90 tablet    TAKE ONE TABLET BY MOUTH ONCE DAILY    Hypertension goal BP (blood pressure) < 140/90       metoprolol 25 MG 24 hr tablet    TOPROL-XL    90 tablet    TAKE ONE TABLET BY MOUTH ONCE DAILY    Hypertension goal BP (blood pressure) < 140/90       omeprazole 20 MG CR capsule    priLOSEC    90 capsule    TAKE ONE CAPSULE BY MOUTH ONCE DAILY.  TAKE 30-60 MINUTES BEFORE A MEAL.    Gastroesophageal reflux disease without esophagitis       pregabalin 50 MG capsule    LYRICA    90 capsule    Take 1 capsule (50 mg) by mouth 3 times daily    Neuropathy (H)       vitamin D 2000 UNITS Caps     90 capsule    1 po daily    Vitamin D deficiency disease       vitamin E 400 UNIT capsule     30 capsule    Take 1 capsule (400 Units) by mouth daily

## 2017-08-18 NOTE — NURSING NOTE
"Chief Complaint   Patient presents with     RECHECK     Health Maintenance       Initial /68 (BP Location: Right arm, Patient Position: Chair)  Pulse 67  Temp 97  F (36.1  C) (Oral)  Wt 287 lb (130.2 kg)  SpO2 94%  BMI 40.03 kg/m2 Estimated body mass index is 40.03 kg/(m^2) as calculated from the following:    Height as of 1/6/17: 5' 11\" (1.803 m).    Weight as of this encounter: 287 lb (130.2 kg).  Medication Reconciliation: complete     Francine Portillo MA      "

## 2017-08-18 NOTE — PROGRESS NOTES
SUBJECTIVE:                                                    Gucci Logan is a 67 year old male who presents to clinic today with himself because of:    Chief Complaint   Patient presents with     RECHECK     Health Maintenance        HPI:  Concerns: So wants to discuss sneezing, Lyrica and possible narco. , wants to discuss his liver. He is fasting so can take a panel if needed. Wants to review medications and discuss his facial injury from previous appt.       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    Fasting today    Takes lyrica midday            ROS:       PROBLEM LIST:  Patient Active Problem List    Diagnosis Date Noted     Hyperlipidemia LDL goal <100 04/19/2017     Priority: Medium     Impaired fasting glucose 04/19/2017     Priority: Medium     Gastroesophageal reflux disease, esophagitis presence not specified 10/06/2016     Priority: Medium     Obesity, unspecified obesity severity, unspecified obesity type 12/17/2015     Priority: Medium     Anxiety 11/27/2014     Priority: Medium     Hypertension goal BP (blood pressure) < 140/90 02/08/2013     Priority: Medium     Advanced directives, counseling/discussion 01/16/2013     Priority: Medium     Advance Care Planning:   ACP Review and Resources Provided:  Reviewed chart for advance care plan.  Gucci Logan has no plan or code status on file however states presence of ACP document. Copy requested. Confirmed code status reflects current choices pending receipt of document/advance care plan review. Confirmed/documented designated decision maker(s). See permanent comments section of demographics in clinical tab.   Added by Elyssa Olguin on 7/22/2014  Patient states has Advance Directive and will bring in a copy to  clinic. 1/16/2013              CARDIOVASCULAR SCREENING; LDL GOAL LESS THAN 130 01/16/2013     Priority: Medium      MEDICATIONS:  Current Outpatient Prescriptions   Medication Sig Dispense Refill     cyclobenzaprine (FLEXERIL) 10 MG tablet Take 1 tablet (10 mg) by mouth 3 times daily as needed for muscle spasms 90 tablet 0     metoprolol (TOPROL-XL) 25 MG 24 hr tablet TAKE ONE TABLET BY MOUTH ONCE DAILY 90 tablet 0     amLODIPine (NORVASC) 2.5 MG tablet TAKE ONE TABLET BY MOUTH ONCE DAILY 90 tablet 0     losartan (COZAAR) 100 MG tablet TAKE ONE TABLET BY MOUTH ONCE DAILY 90 tablet 0     omeprazole (PRILOSEC) 20 MG CR capsule TAKE ONE CAPSULE BY MOUTH ONCE DAILY.  TAKE 30-60 MINUTES BEFORE A MEAL. 90 capsule 0     hydrochlorothiazide (HYDRODIURIL) 25 MG tablet TAKE ONE TABLET BY MOUTH ONCE DAILY 90 tablet 0     diazepam (VALIUM) 5 MG tablet 1/2 to 1 po every 6 hours as needed for anxiety 6 tablet 0     biotin 2.5 mg/mL SUSP Take by mouth daily       pregabalin (LYRICA) 50 MG capsule Take 1 capsule (50 mg) by mouth 3 times daily 90 capsule 1     oxyCODONE (ROXICODONE) 5 MG IR tablet Take 1 tablet (5 mg) by mouth every 6 hours as needed for pain 30 tablet 0     vitamin E 400 UNIT capsule Take 1 capsule (400 Units) by mouth daily 30 capsule      glutamine 500 MG capsule Take 1 capsule (500 mg) by mouth daily       Cholecalciferol (VITAMIN D) 2000 UNITS CAPS 1 po daily 90 capsule 3      ALLERGIES:  Allergies   Allergen Reactions     Influenza Vac Typ Other (See Comments)     Delirium, fever,       Problem list and histories reviewed & adjusted, as indicated.    OBJECTIVE:                                                      /68 (BP Location: Right arm, Patient Position: Chair)  Pulse 67  Temp 97  F (36.1  C) (Oral)  Wt 287 lb (130.2 kg)  SpO2 94%  BMI 40.03 kg/m2   Normalized stature-for-age data not available for patients older than 20 years.   Physical Exam   Constitutional: He is oriented to person, place,  and time and well-developed, well-nourished, and in no distress. No distress.   HENT:   Head: Normocephalic and atraumatic.   Eyes: Conjunctivae are normal.   Neck: Carotid bruit is not present.   Cardiovascular: Normal rate, regular rhythm, normal heart sounds and intact distal pulses.  Exam reveals no gallop and no friction rub.    No murmur heard.  Pulmonary/Chest: Effort normal and breath sounds normal. No respiratory distress. He has no wheezes. He has no rales.   Musculoskeletal: He exhibits edema (mild).   Neurological: He is alert and oriented to person, place, and time.   Skin: He is not diaphoretic.   Psychiatric: Mood and affect normal.         ASSESSMENT/PLAN:                                                        ASSESSMENT / PLAN:  (R53.83) Fatigue, unspecified type  (primary encounter diagnosis)  Comment: check labs   Plan: Comprehensive metabolic panel, TSH with free T4        reflex, CBC with platelets differential             (M25.512) Left shoulder pain, unspecified chronicity  Comment: uses this occasionally   Plan: HYDROcodone-acetaminophen (NORCO) 5-325 MG per         tablet             (M25.561) Right knee pain, unspecified chronicity  Comment: as above   Plan: HYDROcodone-acetaminophen (NORCO) 5-325 MG per         tablet             (F41.9) Anxiety  Comment: uses occasionally, no side effects   Plan: diazepam (VALIUM) 5 MG tablet             (R73.01) Impaired fasting glucose  Comment: 7.6 last time which was quite an increase.  Patient not anxious to start any diab med at all   Plan: Hemoglobin A1c             (M79.1) Muscle pain  Comment: uses fair amount of these   Plan: cyclobenzaprine (FLEXERIL) 10 MG tablet        Refill but warned of side effects     (I10) Hypertension goal BP (blood pressure) < 140/90  Comment: at goal   Plan: refill     (E66.9) Obesity, unspecified obesity severity, unspecified obesity type  Comment: chronic   Plan: keep working on healthy diet/exercise and wt  loss    (F12.89) Elevated LFTs  Comment: minimal elevation  Plan: recheck today.  Discussed tylenol usage, etoh etc. ( does not use much of either )      I reviewed the patient's medications, allergies, medical history, family history, and social history.    Richi Jaramillo MD

## 2017-08-20 NOTE — PROGRESS NOTES
The thyroid tests are similar to last time.  Since the free T4 is normal, no need for thyroid medication.    The diabetes test ( hemoglobin a1c ) is a bit better, but still high.  No medication needed. Just keep working on healthy diet/exercise and weight loss.    Other labs are all stable.    Richi Jaramillo MD

## 2017-09-13 DIAGNOSIS — G62.9 NEUROPATHY: ICD-10-CM

## 2017-09-13 RX ORDER — PREGABALIN 50 MG
CAPSULE ORAL
Qty: 90 CAPSULE | Refills: 5 | Status: SHIPPED | OUTPATIENT
Start: 2017-09-13 | End: 2019-11-05

## 2017-09-13 NOTE — TELEPHONE ENCOUNTER
pregabalin (LYRICA) 50 MG capsule      Last Written Prescription Date:  4/19/17  Last Fill Quantity: 90,   # refills: 1  Last Office Visit with [facility], [facility] or [facility] prescribing provider: 8/18/17  Future Office visit:       Routing refill request to provider for review/approval because:  Drug not on the [facility], [facility] or [facility] refill protocol or controlled substance

## 2017-10-11 ENCOUNTER — OFFICE VISIT (OUTPATIENT)
Dept: FAMILY MEDICINE | Facility: CLINIC | Age: 67
End: 2017-10-11
Payer: COMMERCIAL

## 2017-10-11 VITALS
SYSTOLIC BLOOD PRESSURE: 146 MMHG | TEMPERATURE: 97.2 F | OXYGEN SATURATION: 92 % | DIASTOLIC BLOOD PRESSURE: 74 MMHG | BODY MASS INDEX: 39.82 KG/M2 | HEART RATE: 73 BPM | WEIGHT: 285.5 LBS

## 2017-10-11 DIAGNOSIS — F41.9 ANXIETY: ICD-10-CM

## 2017-10-11 DIAGNOSIS — I10 ESSENTIAL HYPERTENSION WITH GOAL BLOOD PRESSURE LESS THAN 140/90: ICD-10-CM

## 2017-10-11 DIAGNOSIS — R60.0 BILATERAL LOWER EXTREMITY EDEMA: ICD-10-CM

## 2017-10-11 DIAGNOSIS — I10 HYPERTENSION GOAL BP (BLOOD PRESSURE) < 140/90: ICD-10-CM

## 2017-10-11 DIAGNOSIS — M25.512 LEFT SHOULDER PAIN, UNSPECIFIED CHRONICITY: ICD-10-CM

## 2017-10-11 DIAGNOSIS — J20.9 ACUTE BRONCHITIS WITH SYMPTOMS > 10 DAYS: Primary | ICD-10-CM

## 2017-10-11 DIAGNOSIS — M79.10 MUSCLE PAIN: ICD-10-CM

## 2017-10-11 DIAGNOSIS — H10.30 ACUTE CONJUNCTIVITIS, UNSPECIFIED ACUTE CONJUNCTIVITIS TYPE, UNSPECIFIED LATERALITY: ICD-10-CM

## 2017-10-11 DIAGNOSIS — M25.561 RIGHT KNEE PAIN, UNSPECIFIED CHRONICITY: ICD-10-CM

## 2017-10-11 DIAGNOSIS — K21.9 GASTROESOPHAGEAL REFLUX DISEASE WITHOUT ESOPHAGITIS: ICD-10-CM

## 2017-10-11 PROCEDURE — 99214 OFFICE O/P EST MOD 30 MIN: CPT | Performed by: FAMILY MEDICINE

## 2017-10-11 RX ORDER — DIAZEPAM 5 MG
TABLET ORAL
Qty: 10 TABLET | Refills: 0 | Status: SHIPPED | OUTPATIENT
Start: 2017-10-11 | End: 2019-02-05

## 2017-10-11 RX ORDER — AMLODIPINE BESYLATE 2.5 MG/1
2.5 TABLET ORAL DAILY
Qty: 90 TABLET | Refills: 1 | Status: SHIPPED | OUTPATIENT
Start: 2017-10-11 | End: 2018-03-12

## 2017-10-11 RX ORDER — LOSARTAN POTASSIUM 100 MG/1
100 TABLET ORAL DAILY
Qty: 90 TABLET | Refills: 1 | Status: SHIPPED | OUTPATIENT
Start: 2017-10-11 | End: 2018-03-12

## 2017-10-11 RX ORDER — HYDROCHLOROTHIAZIDE 25 MG/1
25 TABLET ORAL DAILY
Qty: 90 TABLET | Refills: 1 | Status: SHIPPED | OUTPATIENT
Start: 2017-10-11 | End: 2018-03-12

## 2017-10-11 RX ORDER — CYCLOBENZAPRINE HCL 10 MG
10 TABLET ORAL 3 TIMES DAILY PRN
Qty: 90 TABLET | Refills: 0 | Status: SHIPPED | OUTPATIENT
Start: 2017-10-11 | End: 2017-12-04

## 2017-10-11 RX ORDER — METOPROLOL SUCCINATE 50 MG/1
50 TABLET, EXTENDED RELEASE ORAL DAILY
Qty: 90 TABLET | Refills: 1 | Status: SHIPPED | OUTPATIENT
Start: 2017-10-11 | End: 2018-03-12

## 2017-10-11 RX ORDER — POLYMYXIN B SULFATE AND TRIMETHOPRIM 1; 10000 MG/ML; [USP'U]/ML
1 SOLUTION OPHTHALMIC 4 TIMES DAILY
Qty: 2 ML | Refills: 0 | Status: SHIPPED | OUTPATIENT
Start: 2017-10-11 | End: 2017-10-18

## 2017-10-11 RX ORDER — AZITHROMYCIN 250 MG/1
TABLET, FILM COATED ORAL
Qty: 6 TABLET | Refills: 2 | Status: SHIPPED | OUTPATIENT
Start: 2017-10-11 | End: 2017-11-17

## 2017-10-11 RX ORDER — METOPROLOL SUCCINATE 25 MG/1
25 TABLET, EXTENDED RELEASE ORAL DAILY
Qty: 90 TABLET | Refills: 0 | Status: SHIPPED | OUTPATIENT
Start: 2017-10-11 | End: 2017-10-11

## 2017-10-11 RX ORDER — HYDROCODONE BITARTRATE AND ACETAMINOPHEN 5; 325 MG/1; MG/1
1 TABLET ORAL EVERY 6 HOURS PRN
Qty: 30 TABLET | Refills: 0 | Status: SHIPPED | OUTPATIENT
Start: 2017-10-11 | End: 2018-02-23

## 2017-10-11 ASSESSMENT — PAIN SCALES - GENERAL: PAINLEVEL: NO PAIN (0)

## 2017-10-11 NOTE — NURSING NOTE
"Chief Complaint   Patient presents with     Eye Problem     Health Maintenance       Initial /78 (BP Location: Right arm, Patient Position: Chair, Cuff Size: Adult Regular)  Pulse 73  Temp 97.2  F (36.2  C) (Oral)  Wt 285 lb 8 oz (129.5 kg)  SpO2 92%  BMI 39.82 kg/m2 Estimated body mass index is 39.82 kg/(m^2) as calculated from the following:    Height as of 1/6/17: 5' 11\" (1.803 m).    Weight as of this encounter: 285 lb 8 oz (129.5 kg).  Medication Reconciliation: complete   Sofia Conde CMA      "

## 2017-10-11 NOTE — PROGRESS NOTES
SUBJECTIVE:   Gucci Logan is a 67 year old male who presents to clinic today for the following health issues:       Left eye watering and discharge for the past week    none    Problem list and histories reviewed & adjusted, as indicated.  Additional history: as documented         Reviewed and updated as needed this visit by clinical staff       Reviewed and updated as needed this visit by Provider          off and on overall feeling    Feels old    Tries to be retired        Appointment to see the ophthalmologist but no surgery scheduled yet    Left eye watering a lot    Yellow in am     Left eye feels dry    No runny nose/ sneezing    But maybe low grade cold starting    History of hep c treatment    Productive cough especially in am    Phlegm from chest    Tried zaditor, didn't help much    Physical Exam   Constitutional: He is oriented to person, place, and time and well-developed, well-nourished, and in no distress.   HENT:   Head: Normocephalic and atraumatic.   Right Ear: Tympanic membrane, external ear and ear canal normal.   Left Ear: Tympanic membrane, external ear and ear canal normal.   Mildly red throat. Mild soreness over sinuses/ submandib area.   Eyes: Conjunctivae are normal.   Neck: Neck supple.   Cardiovascular: Normal rate and normal heart sounds.    Pulmonary/Chest: Effort normal and breath sounds normal. No respiratory distress.   Lymphadenopathy:     He has no cervical adenopathy.   Neurological: He is alert and oriented to person, place, and time.     Both eyes fairly normal on exam here.  Patient states there is a lot more of the unusual yellow drainage in am    ASSESSMENT / PLAN:  (J20.9) Acute bronchitis with symptoms > 10 days  (primary encounter diagnosis)  Comment: given duration of symptoms, about 2 weeks of productive cough, prudent to cover with antibiotics   Plan: azithromycin (ZITHROMAX) 250 MG tablet             (M25.512) Left shoulder pain, unspecified  chronicity  Comment: needs refill   Plan: HYDROcodone-acetaminophen (NORCO) 5-325 MG per         tablet        Uses occasionally     (M25.561) Right knee pain, unspecified chronicity  Comment: as above   Plan: HYDROcodone-acetaminophen (NORCO) 5-325 MG per         tablet             (M79.1) Muscle pain  Comment: uses occasionally   Plan: cyclobenzaprine (FLEXERIL) 10 MG tablet             (I10) Hypertension goal BP (blood pressure) < 140/90  Comment: increase to 50 mg daily metoprolol  Plan: losartan (COZAAR) 100 MG tablet, metoprolol         (TOPROL-XL) 50 MG 24 hr tablet, DISCONTINUED:         metoprolol (TOPROL-XL) 25 MG 24 hr tablet             (R60.0) Bilateral lower extremity edema  Comment: refill   Plan: hydrochlorothiazide (HYDRODIURIL) 25 MG tablet             (K21.9) Gastroesophageal reflux disease without esophagitis  Comment: needs refill   Plan: omeprazole (PRILOSEC) 20 MG CR capsule             (I10) Essential hypertension with goal blood pressure less than 140/90  Comment: refill   Plan: amLODIPine (NORVASC) 2.5 MG tablet             (F41.9) Anxiety  Comment: uses rarely   Plan: diazepam (VALIUM) 5 MG tablet        Refill     (H10.30) Acute conjunctivitis, unspecified acute conjunctivitis type, unspecified laterality  Comment: will cover for possible bacterial antibiotic drop  Plan: trimethoprim-polymyxin b (POLYTRIM) ophthalmic         solution               I reviewed the patient's medications, allergies, medical history, family history, and social history.    Richi Jaramillo MD

## 2017-10-11 NOTE — PATIENT INSTRUCTIONS
Keep working on healthy diet/exercise and wt loss    Increase metoprolol to 50 mg     Return to clinic in 2-3 months, sooner if needed    Eye drops for at least 5 days

## 2017-10-11 NOTE — MR AVS SNAPSHOT
After Visit Summary   10/11/2017    Gucci Logan    MRN: [id number]           Patient Information     Date Of Birth          1950        Visit Information        Provider Department      10/11/2017 9:00 AM Richi Jaramillo MD [facility]        Today's Diagnoses     Acute bronchitis with symptoms > 10 days    -  1    Left shoulder pain, unspecified chronicity        Right knee pain, unspecified chronicity        Muscle pain        Hypertension goal BP (blood pressure) < 140/90        Bilateral lower extremity edema        Gastroesophageal reflux disease without esophagitis        Essential hypertension with goal blood pressure less than 140/90        Anxiety        Acute conjunctivitis, unspecified acute conjunctivitis type, unspecified laterality          Care Instructions    Keep working on healthy diet/exercise and wt loss    Increase metoprolol to 50 mg     Return to clinic in 2-3 months, sooner if needed    Eye drops for at least 5 days            Follow-ups after your visit        Who to contact     If you have questions or need follow up information about today's clinic visit or your schedule please contact [facility] directly at [phone].  Normal or non-critical lab and imaging results will be communicated to you by TalkBinhart, letter or phone within 4 business days after the clinic has received the results. If you do not hear from us within 7 days, please contact the clinic through MedaPhort or phone. If you have a critical or abnormal lab result, we will notify you by phone as soon as possible.  Submit refill requests through [email or web address] or call your pharmacy and they will forward the refill request to us. Please allow 3 business days for your refill to be completed.          Additional Information About Your Visit        TalkBinharObjectVideo Information     [email or web address] lets you send messages to your doctor, view your test results, renew your prescriptions,  "schedule appointments and more. To sign up, go to [email or web address] . Click on \"Log in\" on the left side of the screen, which will take you to the Welcome page. Then click on \"Sign up Now\" on the right side of the page.     You will be asked to enter the access code listed below, as well as some personal information. Please follow the directions to create your username and password.     Your access code is: [email or web address]  Expires: 2017  8:28 AM     Your access code will  in 90 days. If you need help or a new code, please call your [facility] clinic or [phone].        Care EveryWhere ID     This is your Care EveryWhere ID. This could be used by other organizations to access your [facility] medical records  [id number]        Your Vitals Were     Pulse Temperature Pulse Oximetry BMI (Body Mass Index)          73 97.2  F (36.2  C) (Oral) 92% 39.82 kg/m2         Blood Pressure from Last 3 Encounters:   10/11/17 146/74   17 133/68   17 136/75    Weight from Last 3 Encounters:   10/11/17 285 lb 8 oz (129.5 kg)   17 287 lb (130.2 kg)   17 289 lb 12 oz (131.4 kg)              Today, you had the following     No orders found for display         Today's Medication Changes          These changes are accurate as of: 10/11/17  9:41 AM.  If you have any questions, ask your nurse or doctor.               Start taking these medicines.        Dose/Directions    azithromycin 250 MG tablet   Commonly known as:  ZITHROMAX   Used for:  Acute bronchitis with symptoms > 10 days   Started by:  Richi Jaramillo MD        2 po daily x one day then 1 po daily x 4 days   Quantity:  6 tablet   Refills:  2       trimethoprim-polymyxin b ophthalmic solution   Commonly known as:  POLYTRIM   Used for:  Acute conjunctivitis, unspecified acute conjunctivitis type, unspecified laterality   Started by:  Richi Jaramillo MD        Dose:  1 drop   Place 1 drop into both eyes 4 times daily for 7 days "   Quantity:  2 mL   Refills:  0         These medicines have changed or have updated prescriptions.        Dose/Directions    amLODIPine 2.5 MG tablet   Commonly known as:  NORVASC   This may have changed:  See the new instructions.   Used for:  Essential hypertension with goal blood pressure less than 140/90   Changed by:  Richi Jaramillo MD        Dose:  2.5 mg   Take 1 tablet (2.5 mg) by mouth daily   Quantity:  90 tablet   Refills:  1       hydrochlorothiazide 25 MG tablet   Commonly known as:  HYDRODIURIL   This may have changed:  See the new instructions.   Used for:  Bilateral lower extremity edema   Changed by:  Richi Jaramillo MD        Dose:  25 mg   Take 1 tablet (25 mg) by mouth daily   Quantity:  90 tablet   Refills:  1       losartan 100 MG tablet   Commonly known as:  COZAAR   This may have changed:  See the new instructions.   Used for:  Hypertension goal BP (blood pressure) < 140/90   Changed by:  Richi Jaramillo MD        Dose:  100 mg   Take 1 tablet (100 mg) by mouth daily   Quantity:  90 tablet   Refills:  1       metoprolol 50 MG 24 hr tablet   Commonly known as:  TOPROL-XL   This may have changed:  See the new instructions.   Used for:  Hypertension goal BP (blood pressure) < 140/90   Changed by:  Richi Jaramillo MD        Dose:  50 mg   Take 1 tablet (50 mg) by mouth daily   Quantity:  90 tablet   Refills:  1       omeprazole 20 MG CR capsule   Commonly known as:  priLOSEC   This may have changed:  See the new instructions.   Used for:  Gastroesophageal reflux disease without esophagitis   Changed by:  Richi Jaramillo MD        TAKE ONE CAPSULE BY MOUTH ONCE DAILY.  TAKE 30-60 MINUTES BEFORE A MEAL.   Quantity:  90 capsule   Refills:  3         Stop taking these medicines if you haven't already. Please contact your care team if you have questions.     glutamine 500 MG capsule   Stopped by:  Richi Jaramillo MD                Where to get your medicines      These medications were  sent to [facility] - YANET GALLEGOS - [address]  [address] [place] [address]     Phone:  [phone]     amLODIPine 2.5 MG tablet    azithromycin 250 MG tablet    cyclobenzaprine 10 MG tablet    hydrochlorothiazide 25 MG tablet    losartan 100 MG tablet    metoprolol 50 MG 24 hr tablet    omeprazole 20 MG CR capsule    trimethoprim-polymyxin b ophthalmic solution         Some of these will need a paper prescription and others can be bought over the counter.  Ask your nurse if you have questions.     Bring a paper prescription for each of these medications     diazepam 5 MG tablet    HYDROcodone-acetaminophen 5-325 MG per tablet                Primary Care Provider Office Phone # Fax #    Richi Jaramillo [phone] [address] [address]        Equal Access to Services     CHRISSY [facility] HospitalGLENDA : Hadii gonzalo nelson hadasho Soomaali, waaxda luqadaha, qaybta kaalmada adenafisayakwesi, shannan herrera . So [facility] [phone].    ATENCIÓN: Si habla español, tiene a khoury disposición servicios gratuitos de asistencia lingüística. Manohar al [phone].    We comply with applicable federal civil rights laws and [place] laws. We do not discriminate on the basis of race, color, national origin, age, disability, sex, sexual orientation, or gender identity.            Thank you!     Thank you for choosing [facility]  for your care. Our goal is always to provide you with excellent care. Hearing back from our patients is one way we can continue to improve our services. Please take a few minutes to complete the written survey that you may receive in the mail after your visit with us. Thank you!             Your Updated Medication List - Protect others around you: Learn how to safely use, store and throw away your medicines at www.disposemymeds.org.          This list is accurate as of: 10/11/17  9:41 AM.  Always  use your most recent med list.                   Brand Name Dispense Instructions for use Diagnosis    amLODIPine 2.5 MG tablet    NORVASC    90 tablet    Take 1 tablet (2.5 mg) by mouth daily    Essential hypertension with goal blood pressure less than 140/90       azithromycin 250 MG tablet    ZITHROMAX    6 tablet    2 po daily x one day then 1 po daily x 4 days    Acute bronchitis with symptoms > 10 days       biotin 2.5 mg/mL Susp      Take by mouth daily        cyclobenzaprine 10 MG tablet    FLEXERIL    90 tablet    Take 1 tablet (10 mg) by mouth 3 times daily as needed for muscle spasms    Muscle pain       diazepam 5 MG tablet    VALIUM    10 tablet    1/2 to 1 po every 6 hours as needed for anxiety    Anxiety       hydrochlorothiazide 25 MG tablet    HYDRODIURIL    90 tablet    Take 1 tablet (25 mg) by mouth daily    Bilateral lower extremity edema       HYDROcodone-acetaminophen 5-325 MG per tablet    NORCO    30 tablet    Take 1 tablet by mouth every 6 hours as needed for moderate to severe pain    Left shoulder pain, unspecified chronicity, Right knee pain, unspecified chronicity       losartan 100 MG tablet    COZAAR    90 tablet    Take 1 tablet (100 mg) by mouth daily    Hypertension goal BP (blood pressure) < 140/90       LYRICA 50 MG capsule   Generic drug:  pregabalin     90 capsule    TAKE ONE CAPSULE BY MOUTH THREE TIMES DAILY    Neuropathy       metoprolol 50 MG 24 hr tablet    TOPROL-XL    90 tablet    Take 1 tablet (50 mg) by mouth daily    Hypertension goal BP (blood pressure) < 140/90       omeprazole 20 MG CR capsule    priLOSEC    90 capsule    TAKE ONE CAPSULE BY MOUTH ONCE DAILY.  TAKE 30-60 MINUTES BEFORE A MEAL.    Gastroesophageal reflux disease without esophagitis       trimethoprim-polymyxin b ophthalmic solution    POLYTRIM    2 mL    Place 1 drop into both eyes 4 times daily for 7 days    Acute conjunctivitis, unspecified acute conjunctivitis type, unspecified laterality        vitamin D 2000 UNITS Caps     90 capsule    1 po daily    Vitamin D deficiency disease       vitamin E 400 UNIT capsule     30 capsule    Take 1 capsule (400 Units) by mouth daily

## 2017-11-17 ENCOUNTER — OFFICE VISIT (OUTPATIENT)
Dept: FAMILY MEDICINE | Facility: CLINIC | Age: 67
End: 2017-11-17
Payer: COMMERCIAL

## 2017-11-17 VITALS
HEIGHT: 71 IN | BODY MASS INDEX: 40.18 KG/M2 | OXYGEN SATURATION: 95 % | HEART RATE: 82 BPM | TEMPERATURE: 98 F | WEIGHT: 287 LBS | SYSTOLIC BLOOD PRESSURE: 139 MMHG | DIASTOLIC BLOOD PRESSURE: 79 MMHG

## 2017-11-17 DIAGNOSIS — Z01.818 PREOP GENERAL PHYSICAL EXAM: Primary | ICD-10-CM

## 2017-11-17 DIAGNOSIS — H26.9 CATARACT OF BOTH EYES, UNSPECIFIED CATARACT TYPE: ICD-10-CM

## 2017-11-17 LAB
BASOPHILS # BLD AUTO: 0 10E9/L (ref 0–0.2)
BASOPHILS NFR BLD AUTO: 0.2 %
CREAT SERPL-MCNC: 1.04 MG/DL (ref 0.66–1.25)
DIFFERENTIAL METHOD BLD: ABNORMAL
EOSINOPHIL # BLD AUTO: 0.1 10E9/L (ref 0–0.7)
EOSINOPHIL NFR BLD AUTO: 2.2 %
ERYTHROCYTE [DISTWIDTH] IN BLOOD BY AUTOMATED COUNT: 13.3 % (ref 10–15)
GFR SERPL CREATININE-BSD FRML MDRD: 71 ML/MIN/1.7M2
HCT VFR BLD AUTO: 45.9 % (ref 40–53)
HGB BLD-MCNC: 15.3 G/DL (ref 13.3–17.7)
LYMPHOCYTES # BLD AUTO: 1 10E9/L (ref 0.8–5.3)
LYMPHOCYTES NFR BLD AUTO: 19 %
MCH RBC QN AUTO: 32.8 PG (ref 26.5–33)
MCHC RBC AUTO-ENTMCNC: 33.3 G/DL (ref 31.5–36.5)
MCV RBC AUTO: 99 FL (ref 78–100)
MONOCYTES # BLD AUTO: 0.6 10E9/L (ref 0–1.3)
MONOCYTES NFR BLD AUTO: 10.2 %
NEUTROPHILS # BLD AUTO: 3.7 10E9/L (ref 1.6–8.3)
NEUTROPHILS NFR BLD AUTO: 68.4 %
PLATELET # BLD AUTO: 87 10E9/L (ref 150–450)
POTASSIUM SERPL-SCNC: 4.4 MMOL/L (ref 3.4–5.3)
RBC # BLD AUTO: 4.66 10E12/L (ref 4.4–5.9)
WBC # BLD AUTO: 5.4 10E9/L (ref 4–11)

## 2017-11-17 PROCEDURE — 99214 OFFICE O/P EST MOD 30 MIN: CPT | Performed by: FAMILY MEDICINE

## 2017-11-17 PROCEDURE — 85025 COMPLETE CBC W/AUTO DIFF WBC: CPT | Performed by: FAMILY MEDICINE

## 2017-11-17 PROCEDURE — 82565 ASSAY OF CREATININE: CPT | Performed by: FAMILY MEDICINE

## 2017-11-17 PROCEDURE — 84132 ASSAY OF SERUM POTASSIUM: CPT | Performed by: FAMILY MEDICINE

## 2017-11-17 PROCEDURE — 36415 COLL VENOUS BLD VENIPUNCTURE: CPT | Performed by: FAMILY MEDICINE

## 2017-11-17 ASSESSMENT — PAIN SCALES - GENERAL: PAINLEVEL: NO PAIN (0)

## 2017-11-17 NOTE — PROGRESS NOTES
[address] [id number] [phone]  Dept: [phone]    PRE-OP EVALUATION:  Today's date: 2017    Gucci Logan (: 1950) presents for pre-operative evaluation assessment as requested by Dr. Fabian Koch.  He requires evaluation and anesthesia risk assessment prior to undergoing surgery/procedure for treatment of cataract .  Proposed procedure: cataract with lens implant    Date of Surgery/ Procedure:  and 2017  Time of Surgery/ Procedure: pm  Hospital/Surgical Facility: [facility]  Fax number for surgical facility: [phone]  Primary Physician: Richi Jaramillo  Type of Anesthesia Anticipated: Local    Patient has a Health Care Directive or Living Will:  NO    1. NO - Do you have a history of heart attack, stroke, stent, bypass or surgery on an artery in the head, neck, heart or legs?  2. YES - DO YOU EVER HAVE ANY PAIN OR DISCOMFORT IN YOUR CHEST? Just cold symptoms recently.    3. NO - Do you have a history of  Heart Failure?  4. NO - Are you troubled by shortness of breath when: walking on the level, up a slight hill or at night?  5. YES - DO YOU CURRENTLY HAVE A COLD, BRONCHITIS OR OTHER RESPIRATORY INFECTION? As above, uri recently.  Took antibiotics a few weeks ago.   6. YES - DO YOU HAVE A COUGH, SHORTNESS OF BREATH OR WHEEZING? Still some cough/ phlegm  7. YES - DO YOU SOMETIMES GET PAINS IN THE CALVES OF YOUR LEGS WHEN YOU WALK? No change recently  8. NO - Do you or anyone in your family have previous history of blood clots?  9. NO - Do you or does anyone in your family have a serious bleeding problem such as prolonged bleeding following surgeries or cuts?  10. NO - Have you ever had problems with anemia or been told to take iron pills?  11. NO - Have you had any abnormal blood loss such as black, tarry or bloody stools, or abnormal vaginal bleeding?  12. YES - HAVE YOU EVER HAD A BLOOD  TRANSFUSION? Years ago  13. NO - Have you or any of your relatives ever had problems with anesthesia?  14. YES - DO YOU HAVE SLEEP APNEA, EXCESSIVE SNORING OR DAYTIME DROWSINESS? Has sleep apnea but can't tolerated mask/ machine  15. NO - Do you have any prosthetic heart valves?  16. NO - Do you have prosthetic joints?  17. NO - Is there any chance that you may be pregnant?        HPI:                                                      Brief HPI related to upcoming procedure: patient to have right eye done 1st and then left later. Cataracts.           MEDICAL HISTORY:                                                    Patient Active Problem List    Diagnosis Date Noted     Hyperlipidemia LDL goal <100 04/19/2017     Priority: Medium     Impaired fasting glucose 04/19/2017     Priority: Medium     Gastroesophageal reflux disease, esophagitis presence not specified 10/06/2016     Priority: Medium     Obesity, unspecified obesity severity, unspecified obesity type 12/17/2015     Priority: Medium     Anxiety 11/27/2014     Priority: Medium     Hypertension goal BP (blood pressure) < 140/90 02/08/2013     Priority: Medium     Advanced directives, counseling/discussion 01/16/2013     Priority: Medium     Advance Care Planning:   ACP Review and Resources Provided:  Reviewed chart for advance care plan.  Gucci Logan has no plan or code status on file however states presence of ACP document. Copy requested. Confirmed code status reflects current choices pending receipt of document/advance care plan review. Confirmed/documented designated decision maker(s). See permanent comments section of demographics in clinical tab.   Added by Elyssa Olguin on 7/22/2014  Patient states has Advance Directive and will bring in a copy to clinic. 1/16/2013              CARDIOVASCULAR SCREENING; LDL GOAL LESS THAN 130 01/16/2013     Priority: Medium      Past Medical History:   Diagnosis Date     Arthritis      Esophageal  reflux 3/1/2014     Hearing problem      Hiatal hernia      Hypertension      Liver disease      Obesity 1/24/2013     Obstructive sleep apnea      Sleep apnea     Advised CPAP machine. Not keen to use it.      Past Surgical History:   Procedure Laterality Date     ARTHROSCOPY KNEE RT/LT      (L) with partial medial meniscectomy     C OPEN RX ANKLE DISLOCATN+FIXATN      (R)     C SPINAL FUSION,ANT,EA ADNL LEVEL      T12 - L1     ENDOSCOPIC ENDONASAL SURGERY  1994     ENDOSCOPY  2-19-15     Hernia surgery Left 1994     NASAL/SINUS POLYPECTOMY       ROTATOR CUFF REPAIR RT/LT Right      Current Outpatient Prescriptions   Medication Sig Dispense Refill     Multiple Vitamins-Minerals (MULTIVITAMIN ADULT PO)        HYDROcodone-acetaminophen (NORCO) 5-325 MG per tablet Take 1 tablet by mouth every 6 hours as needed for moderate to severe pain 30 tablet 0     cyclobenzaprine (FLEXERIL) 10 MG tablet Take 1 tablet (10 mg) by mouth 3 times daily as needed for muscle spasms 90 tablet 0     hydrochlorothiazide (HYDRODIURIL) 25 MG tablet Take 1 tablet (25 mg) by mouth daily 90 tablet 1     omeprazole (PRILOSEC) 20 MG CR capsule TAKE ONE CAPSULE BY MOUTH ONCE DAILY.  TAKE 30-60 MINUTES BEFORE A MEAL. 90 capsule 3     losartan (COZAAR) 100 MG tablet Take 1 tablet (100 mg) by mouth daily 90 tablet 1     amLODIPine (NORVASC) 2.5 MG tablet Take 1 tablet (2.5 mg) by mouth daily 90 tablet 1     diazepam (VALIUM) 5 MG tablet 1/2 to 1 po every 6 hours as needed for anxiety 10 tablet 0     metoprolol (TOPROL-XL) 50 MG 24 hr tablet Take 1 tablet (50 mg) by mouth daily 90 tablet 1     LYRICA 50 MG capsule TAKE ONE CAPSULE BY MOUTH THREE TIMES DAILY 90 capsule 5     vitamin E 400 UNIT capsule Take 1 capsule (400 Units) by mouth daily 30 capsule      Cholecalciferol (VITAMIN D) 2000 UNITS CAPS 1 po daily 90 capsule 3     biotin 2.5 mg/mL SUSP Take by mouth daily     just taking the lyrica as needed    OTC products: multivitamin  daily    Allergies   Allergen Reactions     Influenza Vac Typ Other (See Comments)     Delirium, fever,      Latex Allergy: NO    Social History   Substance Use Topics     Smoking status: Former Smoker     Years: 5.00     Types: Cigarettes     Start date: 1/1/1990     Quit date: 1/1/1999     Smokeless tobacco: Never Used     Alcohol use No      Comment: quit in 1999     History   Drug Use No       REVIEW OF SYSTEMS:                                                    Constitutional, neuro, ENT, endocrine, pulmonary, cardiac, gastrointestinal, genitourinary, musculoskeletal, integument and psychiatric systems are negative, except as otherwise noted.  See above / uri        EXAM:                                                    /79 (BP Location: Left arm, Patient Position: Chair, Cuff Size: Adult Large)  Pulse 82  Temp 98  F (36.7  C) (Oral)  Wt 287 lb (130.2 kg)  SpO2 95%  BMI 40.03 kg/m2    GENERAL APPEARANCE: healthy, alert and no distress     EYES: EOMI,  PERRL     HENT: ear canals and TM's normal and nose and mouth without ulcers or lesions     NECK: no adenopathy, no asymmetry, masses, or scars and thyroid normal to palpation     RESP: lungs clear to auscultation - no rales, rhonchi or wheezes     CV: regular rates and rhythm, normal S1 S2, no S3 or S4 and no murmur, click or rub     ABDOMEN:  soft, nontender, no HSM or masses and bowel sounds normal     ABDOMEN: ventral hernia present     MS: extremities normal- no gross deformities noted, no evidence of inflammation in joints, FROM in all extremities.     SKIN: no suspicious lesions or rashes     NEURO: Normal strength and tone, sensory exam grossly normal, mentation intact and speech normal     PSYCH: mentation appears normal. and affect normal/bright     LYMPHATICS: No axillary, cervical, or supraclavicular nodes    DIAGNOSTICS:                                                    No ekg needed    Potassium and creatinine and cbc  pending        Recent Labs   Lab Test  08/18/17   0841  04/20/17   0913   06/24/15   0953 01/09/15   HGB  14.9  15.3   < >  14.6  15.3   PLT  81*  76*   < >  77*  100*   INR   --    --    --   1.05  1.1   NA  140  141   < >  143  143   POTASSIUM  3.7  4.3   < >  4.0  3.9   CR  1.03  0.83   < >  0.97  1.10   A1C  7.3*  7.6*   < >  5.9   --     < > = values in this interval not displayed.        IMPRESSION:                                                    Reason for surgery/procedure: cataracts  Diagnosis/reason for consult: pre-op clearance    The proposed surgical procedure is considered LOW risk.    REVISED CARDIAC RISK INDEX  The patient has the following serious cardiovascular risks for perioperative complications such as (MI, PE, VFib and 3  AV Block):  No serious cardiac risks  INTERPRETATION: 0 risks: Class I (very low risk - 0.4% complication rate)    The patient has the following additional risks for perioperative complications:  No identified additional risks      ICD-10-CM    1. Preop general physical exam Z01.818        RECOMMENDATIONS:                                                           --Patient is to take all scheduled medications on the day of surgery EXCEPT for modifications listed below.    Patient will take his blood pressure meds and omeprazole the am of procedure    Patient has no history of heart problems; no new suspicious symptoms     He has a low grade ongoing congestion/ cough.  Not any worse recently.    Of note, patient has mild diabetes but no meds needed.  Last hemoglobin a1c in summer 2017 was 7.3.    Blood pressure fairly well controlled on current meds    APPROVAL GIVEN to proceed with proposed procedure, without further diagnostic evaluation, providing labs are okay           Signed Electronically by: Richi Jaramillo MD    Copy of this evaluation report is provided to requesting physician.    [facility] Preop Guidelines

## 2017-11-17 NOTE — LETTER
[facility]  [address] [place]  [address]  [phone]        November 20, 2017    Gucci Logan  [address] [facility] [address]        Dear Gucci,    Your preop labs are fine.     Results for orders placed or performed in visit on 11/17/17   CBC with platelets differential   Result Value Ref Range    WBC 5.4 4.0 - 11.0 10e9/L    RBC Count 4.66 4.4 - 5.9 10e12/L    Hemoglobin 15.3 13.3 - 17.7 g/dL    Hematocrit 45.9 40.0 - 53.0 %    MCV 99 78 - 100 fl    MCH 32.8 26.5 - 33.0 pg    MCHC 33.3 31.5 - 36.5 g/dL    RDW 13.3 10.0 - 15.0 %    Platelet Count 87 (L) 150 - 450 10e9/L    Diff Method Automated Method     % Neutrophils 68.4 %    % Lymphocytes 19.0 %    % Monocytes 10.2 %    % Eosinophils 2.2 %    % Basophils 0.2 %    Absolute Neutrophil 3.7 1.6 - 8.3 10e9/L    Absolute Lymphocytes 1.0 0.8 - 5.3 10e9/L    Absolute Monocytes 0.6 0.0 - 1.3 10e9/L    Absolute Eosinophils 0.1 0.0 - 0.7 10e9/L    Absolute Basophils 0.0 0.0 - 0.2 10e9/L   Potassium   Result Value Ref Range    Potassium 4.4 3.4 - 5.3 mmol/L   Creatinine   Result Value Ref Range    Creatinine 1.04 0.66 - 1.25 mg/dL    GFR Estimate 71 >60 mL/min/1.7m2    GFR Estimate If Black 86 >60 mL/min/1.7m2       If you have any questions please call the clinic at [phone].    Sincerely,    Richi Jaramillo MD  [facility]

## 2017-11-17 NOTE — NURSING NOTE
"Chief Complaint   Patient presents with     Pre-Op Exam     Health Maintenance       Initial /79 (BP Location: Left arm, Patient Position: Chair, Cuff Size: Adult Large)  Pulse 82  Temp 98  F (36.7  C) (Oral)  Wt 287 lb (130.2 kg)  SpO2 95%  BMI 40.03 kg/m2 Estimated body mass index is 40.03 kg/(m^2) as calculated from the following:    Height as of 1/6/17: 5' 11\" (1.803 m).    Weight as of this encounter: 287 lb (130.2 kg).  Medication Reconciliation: complete   Sofia Conde CMA      "

## 2017-11-17 NOTE — PATIENT INSTRUCTIONS
Before Your Surgery      Call your surgeon if there is any change in your health. This includes signs of a cold or flu (such as a sore throat, runny nose, cough, rash or fever).    Do not smoke, drink alcohol or take over the counter medicine (unless your surgeon or primary care doctor tells you to) for the 24 hours before and after surgery.    If you take prescribed drugs: Follow your doctor s orders about which medicines to take and which to stop until after surgery.    Eating and drinking prior to surgery: follow the instructions from your surgeon    Take a shower or bath the night before surgery. Use the soap your surgeon gave you to gently clean your skin. If you do not have soap from your surgeon, use your regular soap. Do not shave or scrub the surgery site.  Wear clean pajamas and have clean sheets on your bed.         We will send you lab results    Take blood pressure meds and omeprazole the am of procedure    Call with problems/ questions

## 2017-11-17 NOTE — MR AVS SNAPSHOT
After Visit Summary   11/17/2017    Gucci Logan    MRN: [id number]           Patient Information     Date Of Birth          1950        Visit Information        Provider Department      11/17/2017 9:00 AM Richi Jaramillo MD [facility]        Today's Diagnoses     Preop general physical exam    -  1    Cataract of both eyes, unspecified cataract type          Care Instructions      Before Your Surgery      Call your surgeon if there is any change in your health. This includes signs of a cold or flu (such as a sore throat, runny nose, cough, rash or fever).    Do not smoke, drink alcohol or take over the counter medicine (unless your surgeon or primary care doctor tells you to) for the 24 hours before and after surgery.    If you take prescribed drugs: Follow your doctor s orders about which medicines to take and which to stop until after surgery.    Eating and drinking prior to surgery: follow the instructions from your surgeon    Take a shower or bath the night before surgery. Use the soap your surgeon gave you to gently clean your skin. If you do not have soap from your surgeon, use your regular soap. Do not shave or scrub the surgery site.  Wear clean pajamas and have clean sheets on your bed.         We will send you lab results    Take blood pressure meds and omeprazole the am of procedure    Call with problems/ questions            Follow-ups after your visit        Who to contact     If you have questions or need follow up information about today's clinic visit or your schedule please contact [facility] directly at [phone].  Normal or non-critical lab and imaging results will be communicated to you by MyChart, letter or phone within 4 business days after the clinic has received the results. If you do not hear from us within 7 days, please contact the clinic through MyChart or phone. If you have a critical or abnormal lab result, we  "will notify you by phone as soon as possible.  Submit refill requests through [employer] or call your pharmacy and they will forward the refill request to us. Please allow 3 business days for your refill to be completed.          Additional Information About Your Visit        [employer] Information     [employer] lets you send messages to your doctor, view your test results, renew your prescriptions, schedule appointments and more. To sign up, go to [email or web address] . Click on \"Log in\" on the left side of the screen, which will take you to the Welcome page. Then click on \"Sign up Now\" on the right side of the page.     You will be asked to enter the access code listed below, as well as some personal information. Please follow the directions to create your username and password.     Your access code is: [email or web address]  Expires: 2/15/2018  9:56 AM     Your access code will  in 90 days. If you need help or a new code, please call your [facility] clinic or [phone].        Care EveryWhere ID     This is your Care EveryWhere ID. This could be used by other organizations to access your [facility] medical records  [id number]        Your Vitals Were     Pulse Temperature Height Pulse Oximetry BMI (Body Mass Index)       82 98  F (36.7  C) (Oral) 5' 11\" (1.803 m) 95% 40.03 kg/m2        Blood Pressure from Last 3 Encounters:   17 139/79   10/11/17 146/74   17 133/68    Weight from Last 3 Encounters:   17 287 lb (130.2 kg)   10/11/17 285 lb 8 oz (129.5 kg)   17 287 lb (130.2 kg)              We Performed the Following     CBC with platelets differential     Creatinine     Potassium        Primary Care Provider Office Phone # Fax #    Richi Jaramillo [phone]       [address] [facility] [address]        Equal Access to Services     ARTHUR FERNANDEZ : Alivia spicer Sosylvester, waaxda luqadaha, qaybta lonniealshannan tian . " So [facility] [phone].    ATENCIÓN: Si jonathan rivas, tiene a khoury disposición servicios gratuitos de asistencia lingüística. [place] laws [phone].    We comply with applicable federal civil rights laws and [place] laws. We do not discriminate on the basis of race, color, national origin, age, disability, sex, sexual orientation, or gender identity.            Thank you!     Thank you for choosing [facility]  for your care. Our goal is always to provide you with excellent care. Hearing back from our patients is one way we can continue to improve our services. Please take a few minutes to complete the written survey that you may receive in the mail after your visit with us. Thank you!             Your Updated Medication List - Protect others around you: Learn how to safely use, store and throw away your medicines at www.disposemymeds.org.          This list is accurate as of: 11/17/17  9:56 AM.  Always use your most recent med list.                   Brand Name Dispense Instructions for use Diagnosis    amLODIPine 2.5 MG tablet    NORVASC    90 tablet    Take 1 tablet (2.5 mg) by mouth daily    Essential hypertension with goal blood pressure less than 140/90       biotin 2.5 mg/mL Susp      Take by mouth daily        cyclobenzaprine 10 MG tablet    FLEXERIL    90 tablet    Take 1 tablet (10 mg) by mouth 3 times daily as needed for muscle spasms    Muscle pain       diazepam 5 MG tablet    VALIUM    10 tablet    1/2 to 1 po every 6 hours as needed for anxiety    Anxiety       hydrochlorothiazide 25 MG tablet    HYDRODIURIL    90 tablet    Take 1 tablet (25 mg) by mouth daily    Bilateral lower extremity edema       HYDROcodone-acetaminophen 5-325 MG per tablet    NORCO    30 tablet    Take 1 tablet by mouth every 6 hours as needed for moderate to severe pain    Left shoulder pain, unspecified chronicity, Right knee pain, unspecified chronicity       losartan 100 MG tablet    COZAAR    90 tablet     Take 1 tablet (100 mg) by mouth daily    Hypertension goal BP (blood pressure) < 140/90       LYRICA 50 MG capsule   Generic drug:  pregabalin     90 capsule    TAKE ONE CAPSULE BY MOUTH THREE TIMES DAILY    Neuropathy       metoprolol 50 MG 24 hr tablet    TOPROL-XL    90 tablet    Take 1 tablet (50 mg) by mouth daily    Hypertension goal BP (blood pressure) < 140/90       MULTIVITAMIN ADULT PO           omeprazole 20 MG CR capsule    priLOSEC    90 capsule    TAKE ONE CAPSULE BY MOUTH ONCE DAILY.  TAKE 30-60 MINUTES BEFORE A MEAL.    Gastroesophageal reflux disease without esophagitis       vitamin D 2000 UNITS Caps     90 capsule    1 po daily    Vitamin D deficiency disease       vitamin E 400 UNIT capsule     30 capsule    Take 1 capsule (400 Units) by mouth daily

## 2017-11-27 ENCOUNTER — APPOINTMENT (OUTPATIENT)
Dept: CT IMAGING | Facility: CLINIC | Age: 67
End: 2017-11-27
Attending: FAMILY MEDICINE
Payer: COMMERCIAL

## 2017-11-27 ENCOUNTER — APPOINTMENT (OUTPATIENT)
Dept: ULTRASOUND IMAGING | Facility: CLINIC | Age: 67
End: 2017-11-27
Attending: FAMILY MEDICINE
Payer: COMMERCIAL

## 2017-11-27 ENCOUNTER — HOSPITAL ENCOUNTER (EMERGENCY)
Facility: CLINIC | Age: 67
Discharge: HOME | End: 2017-11-27
Attending: FAMILY MEDICINE | Admitting: FAMILY MEDICINE
Payer: COMMERCIAL

## 2017-11-27 ENCOUNTER — TELEPHONE (OUTPATIENT)
Dept: FAMILY MEDICINE | Facility: CLINIC | Age: 67
End: 2017-11-27

## 2017-11-27 VITALS
SYSTOLIC BLOOD PRESSURE: 151 MMHG | HEART RATE: 71 BPM | RESPIRATION RATE: 20 BRPM | TEMPERATURE: 96.5 F | DIASTOLIC BLOOD PRESSURE: 84 MMHG | OXYGEN SATURATION: 97 %

## 2017-11-27 DIAGNOSIS — K70.9 LIVER DISEASE, CHRONIC, DUE TO ALCOHOL (H): ICD-10-CM
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
## 2017-11-27 DIAGNOSIS — R10.11 ABDOMINAL PAIN, RIGHT UPPER QUADRANT: ICD-10-CM

## 2017-11-27 LAB
ALBUMIN SERPL-MCNC: 3.3 G/DL (ref 3.4–5)
ALBUMIN UR-MCNC: NEGATIVE MG/DL
ALP SERPL-CCNC: 81 U/L (ref 40–150)
ALT SERPL W P-5'-P-CCNC: 117 U/L (ref 0–70)
ANION GAP SERPL CALCULATED.3IONS-SCNC: 6 MMOL/L (ref 3–14)
APPEARANCE UR: CLEAR
APTT PPP: 27 SEC (ref 22–37)
AST SERPL W P-5'-P-CCNC: 73 U/L (ref 0–45)
BASOPHILS # BLD AUTO: 0 10E9/L (ref 0–0.2)
BASOPHILS NFR BLD AUTO: 0.2 %
BILIRUB SERPL-MCNC: 2 MG/DL (ref 0.2–1.3)
BILIRUB UR QL STRIP: NEGATIVE
BUN SERPL-MCNC: 17 MG/DL (ref 7–30)
CALCIUM SERPL-MCNC: 8.4 MG/DL (ref 8.5–10.1)
CHLORIDE SERPL-SCNC: 104 MMOL/L (ref 94–109)
CO2 SERPL-SCNC: 30 MMOL/L (ref 20–32)
COLOR UR AUTO: YELLOW
CREAT SERPL-MCNC: 1.07 MG/DL (ref 0.66–1.25)
DIFFERENTIAL METHOD BLD: ABNORMAL
EOSINOPHIL # BLD AUTO: 0.1 10E9/L (ref 0–0.7)
EOSINOPHIL NFR BLD AUTO: 1.4 %
ERYTHROCYTE [DISTWIDTH] IN BLOOD BY AUTOMATED COUNT: 13.1 % (ref 10–15)
GFR SERPL CREATININE-BSD FRML MDRD: 69 ML/MIN/1.7M2
GLUCOSE SERPL-MCNC: 200 MG/DL (ref 70–99)
GLUCOSE UR STRIP-MCNC: NEGATIVE MG/DL
HCT VFR BLD AUTO: 46.6 % (ref 40–53)
HGB BLD-MCNC: 15.8 G/DL (ref 13.3–17.7)
HGB UR QL STRIP: NEGATIVE
IMM GRANULOCYTES # BLD: 0 10E9/L (ref 0–0.4)
IMM GRANULOCYTES NFR BLD: 0.2 %
INR PPP: 1.05 (ref 0.86–1.14)
KETONES UR STRIP-MCNC: NEGATIVE MG/DL
LACTATE BLD-SCNC: 1.6 MMOL/L (ref 0.7–2)
LEUKOCYTE ESTERASE UR QL STRIP: NEGATIVE
LIPASE SERPL-CCNC: 220 U/L (ref 73–393)
LYMPHOCYTES # BLD AUTO: 1.2 10E9/L (ref 0.8–5.3)
LYMPHOCYTES NFR BLD AUTO: 13.4 %
MCH RBC QN AUTO: 33 PG (ref 26.5–33)
MCHC RBC AUTO-ENTMCNC: 33.9 G/DL (ref 31.5–36.5)
MCV RBC AUTO: 97 FL (ref 78–100)
MONOCYTES # BLD AUTO: 0.8 10E9/L (ref 0–1.3)
MONOCYTES NFR BLD AUTO: 9 %
NEUTROPHILS # BLD AUTO: 6.6 10E9/L (ref 1.6–8.3)
NEUTROPHILS NFR BLD AUTO: 75.8 %
NITRATE UR QL: NEGATIVE
NRBC # BLD AUTO: 0 10*3/UL
NRBC BLD AUTO-RTO: 0 /100
PH UR STRIP: 6.5 PH (ref 5–7)
PLATELET # BLD AUTO: 124 10E9/L (ref 150–450)
POTASSIUM SERPL-SCNC: 4 MMOL/L (ref 3.4–5.3)
PROT SERPL-MCNC: 7.6 G/DL (ref 6.8–8.8)
RBC # BLD AUTO: 4.79 10E12/L (ref 4.4–5.9)
RBC #/AREA URNS AUTO: 1 /HPF (ref 0–2)
SODIUM SERPL-SCNC: 140 MMOL/L (ref 133–144)
SOURCE: ABNORMAL
SP GR UR STRIP: 1.04 (ref 1–1.03)
UROBILINOGEN UR STRIP-MCNC: NORMAL MG/DL (ref 0–2)
WBC # BLD AUTO: 8.8 10E9/L (ref 4–11)
WBC #/AREA URNS AUTO: 0 /HPF (ref 0–2)

## 2017-11-27 PROCEDURE — 25000128 H RX IP 250 OP 636: Performed by: FAMILY MEDICINE

## 2017-11-27 PROCEDURE — 25000125 ZZHC RX 250: Performed by: FAMILY MEDICINE

## 2017-11-27 PROCEDURE — 96361 HYDRATE IV INFUSION ADD-ON: CPT | Performed by: FAMILY MEDICINE

## 2017-11-27 PROCEDURE — 85610 PROTHROMBIN TIME: CPT | Performed by: FAMILY MEDICINE

## 2017-11-27 PROCEDURE — 99285 EMERGENCY DEPT VISIT HI MDM: CPT | Mod: Z6 | Performed by: FAMILY MEDICINE

## 2017-11-27 PROCEDURE — 83605 ASSAY OF LACTIC ACID: CPT | Performed by: FAMILY MEDICINE

## 2017-11-27 PROCEDURE — 80053 COMPREHEN METABOLIC PANEL: CPT | Performed by: FAMILY MEDICINE

## 2017-11-27 PROCEDURE — 81001 URINALYSIS AUTO W/SCOPE: CPT | Performed by: FAMILY MEDICINE

## 2017-11-27 PROCEDURE — 83690 ASSAY OF LIPASE: CPT | Performed by: FAMILY MEDICINE

## 2017-11-27 PROCEDURE — 85730 THROMBOPLASTIN TIME PARTIAL: CPT | Performed by: FAMILY MEDICINE

## 2017-11-27 PROCEDURE — 99285 EMERGENCY DEPT VISIT HI MDM: CPT | Mod: 25 | Performed by: FAMILY MEDICINE

## 2017-11-27 PROCEDURE — 76705 ECHO EXAM OF ABDOMEN: CPT

## 2017-11-27 PROCEDURE — 85025 COMPLETE CBC W/AUTO DIFF WBC: CPT | Performed by: FAMILY MEDICINE

## 2017-11-27 PROCEDURE — 74177 CT ABD & PELVIS W/CONTRAST: CPT

## 2017-11-27 PROCEDURE — 96360 HYDRATION IV INFUSION INIT: CPT | Mod: 59 | Performed by: FAMILY MEDICINE

## 2017-11-27 RX ORDER — MOXIFLOXACIN 5 MG/ML
1 SOLUTION/ DROPS OPHTHALMIC 2 TIMES DAILY
COMMUNITY
End: 2018-02-23

## 2017-11-27 RX ORDER — IOPAMIDOL 755 MG/ML
100 INJECTION, SOLUTION INTRAVASCULAR ONCE
Status: COMPLETED | OUTPATIENT
Start: 2017-11-27 | End: 2017-11-27

## 2017-11-27 RX ORDER — SODIUM CHLORIDE 9 MG/ML
INJECTION, SOLUTION INTRAVENOUS CONTINUOUS
Status: DISCONTINUED | OUTPATIENT
Start: 2017-11-27 | End: 2017-11-27 | Stop reason: HOSPADM

## 2017-11-27 RX ADMIN — SODIUM CHLORIDE: 9 INJECTION, SOLUTION INTRAVENOUS at 17:28

## 2017-11-27 RX ADMIN — IOPAMIDOL 100 ML: 755 INJECTION, SOLUTION INTRAVENOUS at 18:03

## 2017-11-27 RX ADMIN — SODIUM CHLORIDE 74 ML: 9 INJECTION, SOLUTION INTRAVENOUS at 18:03

## 2017-11-27 ASSESSMENT — ENCOUNTER SYMPTOMS
ABDOMINAL PAIN: 1
DIZZINESS: 1
NAUSEA: 1

## 2017-11-27 NOTE — TELEPHONE ENCOUNTER
Reason for Call:  Other appointment    Detailed comments: Patient has discussed his stomach pains with Dr. Jaramillo, and he is hoping to get squeezed in with him sometime this week. Please call to discuss.    Phone Number Patient can be reached at: Home number on file [phone] (home)    Best Time: asap    Can we leave a detailed message on this number? NO patient does not have voicemail, but he says that if he doesn't answer the phone to try again in 5 minutes and he should answer.    Call taken on 11/27/2017 at 2:06 PM by Ulises Mcnulty

## 2017-11-27 NOTE — ED PROVIDER NOTES
History     Chief Complaint   Patient presents with     Abdominal Pain     Patient fell 11/21 on his abdomen onto asphalt, reports urinating blood afterwards which has resolved.  Patient reprots localized RUQ abdominal pain which has increased.  Patient reports nausea also.     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 On 11/21/17, 6 days ago, after having a follow up appointment from his eye surgery on 11/20/17 the patient was walking out of his eye clinic when he accidentally tripped and fell. He fell forward and reports he landed primarily on his abdomen. He is unsure if he hit his head. Over the next couple days he developed some hematuria that his since resolved. However, he has developed right upper quadrant abdominal pain that has persisted. He also complains of dizziness and nausea. He has been eating and drinking normally. The patient denies bloody stool. He does report a recent cold. He is not on any anticoagulant medications. He reports that he is on 4 medications for his blood pressure. Denies any history of abdominal surgeries, reports a history of umbilical hernia.    Past Medical History:   Diagnosis Date     Arthritis      Esophageal reflux 3/1/2014     Hearing problem      Hiatal hernia      Hypertension      Liver disease      Obesity 1/24/2013     Obstructive sleep apnea      Sleep apnea     Advised CPAP machine. Not keen to use it.        Past Surgical History:   Procedure Laterality Date     ARTHROSCOPY KNEE RT/LT      (L) with partial medial meniscectomy     C OPEN RX ANKLE DISLOCATN+FIXATN      (R)     C SPINAL FUSION,ANT,EA ADNL LEVEL      T12 - L1     ENDOSCOPIC ENDONASAL SURGERY  1994     ENDOSCOPY  2-19-15     EYE SURGERY       Hernia surgery Left 1994     NASAL/SINUS POLYPECTOMY       ROTATOR CUFF REPAIR  RT/LT Right        Family History   Problem Relation Age of Onset     Alzheimer Disease Mother 85     Dementia Mother      CEREBROVASCULAR DISEASE Father 50     CANCER Father      Hypertension Father      Neurologic Disorder No family hx of      DIABETES No family hx of        Social History   Substance Use Topics     Smoking status: Former Smoker     Years: 5.00     Types: Cigarettes     Start date: 1/1/1990     Quit date: 1/1/1999     Smokeless tobacco: Never Used     Alcohol use No      Comment: quit in 1999         I have reviewed the Medications, Allergies, Past Medical and Surgical History, and Social History in the Epic system.    Review of Systems   Gastrointestinal: Positive for abdominal pain (RUQ) and nausea.   Neurological: Positive for dizziness.   All other systems reviewed and are negative.      Physical Exam   BP: 164/76  Pulse: 81  Temp: 96.5  F (35.8  C)  Resp: 16  SpO2: 95 %      Physical Exam   Constitutional: He is oriented to person, place, and time. He appears well-developed and well-nourished.   HENT:   Head: Normocephalic and atraumatic.   Mouth/Throat: Oropharynx is clear and moist.   Eyes: EOM are normal. Pupils are equal, round, and reactive to light.   Neck: Normal range of motion. Neck supple. No tracheal deviation present. No thyromegaly present.   Cardiovascular: Normal rate, regular rhythm, normal heart sounds and intact distal pulses.  Exam reveals no gallop and no friction rub.    No murmur heard.  Pulmonary/Chest: Effort normal and breath sounds normal. He exhibits no tenderness.   Abdominal: Soft. Bowel sounds are normal. He exhibits no distension and no mass. There is tenderness in the right upper quadrant. There is guarding (voluntary) and CVA tenderness (right). There is no rigidity and no rebound.   Musculoskeletal: He exhibits no edema or tenderness.   Neurological: He is alert and oriented to person, place, and time. No cranial nerve deficit. Coordination normal.   Skin:  Skin is warm and dry. No rash noted.   Psychiatric: He has a normal mood and affect. His behavior is normal.   Nursing note and vitals reviewed.      ED Course     ED Course     Procedures     5:05 PM  The patient was seen and examined by Dr. Michael in Room 5.               Critical Care time:  none             Labs Ordered and Resulted from Time of ED Arrival Up to the Time of Departure from the ED   CBC WITH PLATELETS DIFFERENTIAL - Abnormal; Notable for the following:        Result Value    Platelet Count 124 (*)     All other components within normal limits   COMPREHENSIVE METABOLIC PANEL - Abnormal; Notable for the following:     Glucose 200 (*)     Calcium 8.4 (*)     Bilirubin Total 2.0 (*)     Albumin 3.3 (*)      (*)     AST 73 (*)     All other components within normal limits   ROUTINE UA WITH MICROSCOPIC REFLEX TO CULTURE - Abnormal; Notable for the following:     Specific Gravity Urine 1.044 (*)     All other components within normal limits   INR   PARTIAL THROMBOPLASTIN TIME   LACTIC ACID WHOLE BLOOD   LIPASE   VITAL SIGNS            Assessments & Plan (with Medical Decision Making)   Patient with a history of chronic liver disease and known splenomegaly and varices presented after he developed some gross hematuria after a fall.  By the time he presented to the ED his hematuria had resolved for more than 24 hours.  He had some persistent right upper quadrant pain.  On exam he is tender to the touch but did not have peritoneal signs.  The remainder of his physical exam was unremarkable.  His labs Reveal evidence of his chronic liver disease but once reviewed as compared to previous labs from care everywhere are stable to improved.  He does have elevated LFTs and bilirubin but this is chronic, gallstones are present, but no signs of cholecystitis or obstruction.  Gallstones have also been present on previous studies.  CT no signs of visible trauma and vital signs stable.  I feel at this time he  can be discharged with close outpatient follow-up.  Discussed expected course, need for follow up, and indications for return with the patient.  See discharge instructions.      I have reviewed the nursing notes.    I have reviewed the findings, diagnosis, plan and need for follow up with the patient.    New Prescriptions    No medications on file       Final diagnoses:   Fall, initial encounter   Abdominal pain, right upper quadrant   Liver disease, chronic, due to alcohol (H)     I, Demarcus Walls, am serving as a trained medical scribe to document services personally performed by Louis Michael MD, based on the provider's statements to me.   ILouis MD, was physically present and have reviewed and verified the accuracy of this note documented by Demarcus Walls.     11/27/2017   [facility], [place], EMERGENCY DEPARTMENT     Louis Michael MD  11/27/17 8431

## 2017-11-27 NOTE — ED AVS SNAPSHOT
[facility], Emergency Department    [address]    [place] [address]    Phone:  [phone]    Fax:  [phone]                                       Gucci Logan   MRN: [id number]    Department:  [facility], Emergency Department   Date of Visit:  11/27/2017           Patient Information     Date Of Birth          1950        Your diagnoses for this visit were:     Fall, initial encounter     Abdominal pain, right upper quadrant     Liver disease, chronic, due to alcohol (H)        You were seen by Louis Michael MD.        Discharge Instructions           * Abdominal Pain,Uncertain Cause [Male]  Based on your visit today, the exact cause of your abdominal (stomach) pain is not clear. Your exam and tests do not indicate a dangerous cause at this time. However, the signs of a serious problem may take more time to appear. Although your exam was reassuring today, sometimes early in the course of many conditions, exam and lab tests can appear normal. Therefore, it is important for you to watch for any new symptoms or worsening of your condition.  Causes:  It may not be obvious what caused your symptoms. Pay attention to things that do seem to make your symptoms worse or better and discuss this with your doctor when you follow up.  Diagnosis:  The evaluation of abdominal pain in the emergency department may only require an exam by the doctor or it may include blood, urine or imaging studies, depending on many factors. Sometimes exams and tests can identify a cause but in many cases, a clear cause is not found. Further testing at follow up visits may help to suggest a clear diagnosis.  Home Care:    Rest as much as possible until your next exam.    Try to avoid any medications (unless otherwise directed by your doctor), foods, activities, or other factors that you may have contributed to your symptoms.    Try to eat foods that you know that you have tolerated well in the past. Certain  diets may be recommended for some conditions that cause abdominal pain. However, since the cause of your symptoms may not be clear, discuss your diet more with your primary care provider or specialist for further recommendations.     Eating several small meals per day as opposed to 2 or 3 larger meals may help.    Monitor closely for anything that may make your symptoms worse or better. Pay close attention to symptoms below that may indicate worsening of your condition.  Follow Up And Precautions:  See your doctor or this facility as instructed (or sooner, if your symptoms are not improving). In some cases, you may need more testing.  Contact Your Doctor Or Seek Medical Attention  if any of the following occur:    Pain is becoming worse    You are unable to take your medications because of too much vomiting    Swelling of the abdomen    Fever of 101 F (38.3 C) or higher, or as directed by your health care provider    Blood in vomit or bowel movements (dark red or black color)    Jaundice (yellow color of eyes and skin)    New onset of weakness, dizziness or fainting    New onset of chest, arm, back, neck or jaw pain    7502-9390 [employer]. [address] [place] [address]. All rights reserved. This information is not intended as a substitute for professional medical care. Always follow your healthcare professional's instructions.  This information has been modified by your health care provider with permission from the publisher.      Thank you for choosing [facility].     Please closely monitor for further symptoms. Return to the Emergency Department if you develop any new or worsening signs or symptoms.    If you received any opiate pain medications or sedatives during your visit, please do not drive for at least 8 hours.     Labs, cultures or final xray interpretations may still need to be reviewed.  We will call you if your plan of care needs to be  changed.    Please follow up with your primary care physician or clinic for recheck in the next 1-2 weeks..      Future Appointments        Provider Department Dept Phone Center    11/29/2017 2:00 PM iRchi Jaramillo MD [facility] [phone] [facility]      24 Hour Appointment Hotline       To make an appointment at any [facility], call [phone] ([phone]). If you don't have a family doctor or clinic, we will help you find one. [facility] are conveniently located to serve the needs of you and your family.             Review of your medicines      Our records show that you are taking the medicines listed below. If these are incorrect, please call your family doctor or clinic.        Dose / Directions Last dose taken    ACULAR OP   Dose:  1 drop        Apply 1 drop to eye 4 times daily   Refills:  0        amLODIPine 2.5 MG tablet   Commonly known as:  NORVASC   Dose:  2.5 mg   Quantity:  90 tablet        Take 1 tablet (2.5 mg) by mouth daily   Refills:  1        biotin 2.5 mg/mL Susp        Take by mouth daily   Refills:  0        cyclobenzaprine 10 MG tablet   Commonly known as:  FLEXERIL   Dose:  10 mg   Quantity:  90 tablet        Take 1 tablet (10 mg) by mouth 3 times daily as needed for muscle spasms   Refills:  0        diazepam 5 MG tablet   Commonly known as:  VALIUM   Quantity:  10 tablet        1/2 to 1 po every 6 hours as needed for anxiety   Refills:  0        hydrochlorothiazide 25 MG tablet   Commonly known as:  HYDRODIURIL   Dose:  25 mg   Quantity:  90 tablet        Take 1 tablet (25 mg) by mouth daily   Refills:  1        HYDROcodone-acetaminophen 5-325 MG per tablet   Commonly known as:  NORCO   Dose:  1 tablet   Quantity:  30 tablet        Take 1 tablet by mouth every 6 hours as needed for moderate to severe pain   Refills:  0        losartan 100 MG tablet   Commonly known as:  COZAAR   Dose:  100 mg   Quantity:  90 tablet        Take 1 tablet (100  mg) by mouth daily   Refills:  1        LYRICA 50 MG capsule   Quantity:  90 capsule   Generic drug:  pregabalin        TAKE ONE CAPSULE BY MOUTH THREE TIMES DAILY   Refills:  5        metoprolol 50 MG 24 hr tablet   Commonly known as:  TOPROL-XL   Dose:  50 mg   Quantity:  90 tablet        Take 1 tablet (50 mg) by mouth daily   Refills:  1        moxifloxacin 0.5 % ophthalmic solution   Commonly known as:  VIGAMOX   Dose:  1 drop        1 drop 2 times daily   Refills:  0        MULTIVITAMIN ADULT PO        Refills:  0        omeprazole 20 MG CR capsule   Commonly known as:  priLOSEC   Quantity:  90 capsule        TAKE ONE CAPSULE BY MOUTH ONCE DAILY.  TAKE 30-60 MINUTES BEFORE A MEAL.   Refills:  3        PRED FORTE OP   Dose:  1 drop        Apply 1 drop to eye   Refills:  0        vitamin D 2000 UNITS Caps   Quantity:  90 capsule        1 po daily   Refills:  3        vitamin E 400 UNIT capsule   Dose:  400 Units   Quantity:  30 capsule        Take 1 capsule (400 Units) by mouth daily   Refills:  0                Procedures and tests performed during your visit     Abdomen US, limited (RUQ only)    CBC with platelets differential    CT Abdomen Pelvis w Contrast    Comprehensive metabolic panel    INR    Lactic acid whole blood    Lipase    Partial thromboplastin time    UA with Microscopic reflex to Culture      Orders Needing Specimen Collection     None      Pending Results     Date and Time Order Name Status Description    11/27/2017 1712 CT Abdomen Pelvis w Contrast Preliminary             Pending Culture Results     No orders found from 11/25/2017 to 11/28/2017.            Pending Results Instructions     If you had any lab results that were not finalized at the time of your Discharge, you can call the ED Lab Result RN at [phone]. You will be contacted by this team for any positive Lab results or changes in treatment. The nurses are available 7 days a week from 10A to 6:30P.  You can leave a message 24  "hours per day and they will return your call.        Thank you for choosing [facility]       Thank you for choosing [facility] for your care. Our goal is always to provide you with excellent care. Hearing back from our patients is one way we can continue to improve our services. Please take a few minutes to complete the written survey that you may receive in the mail after you visit with us. Thank you!        [employer] Information     [employer] lets you send messages to your doctor, view your test results, renew your prescriptions, schedule appointments and more. To sign up, go to [email or web address] . Click on \"Log in\" on the left side of the screen, which will take you to the Welcome page. Then click on \"Sign up Now\" on the right side of the page.     You will be asked to enter the access code listed below, as well as some personal information. Please follow the directions to create your username and password.     Your access code is: [email or web address]  Expires: 2/15/2018  9:56 AM     Your access code will  in 90 days. If you need help or a new code, please call your [facility] clinic or [phone].        Care EveryWhere ID     This is your Care EveryWhere ID. This could be used by other organizations to access your [facility] medical records  [id number]        Equal Access to Services     ARTHUR FERNANDEZ : Alivia Pryor, waaxda luqadaha, qaybta kaalmada aderonen, shanann singh. So [facility] [phone].    ATENCIÓN: Si habla español, tiene a khoury disposición servicios gratuitos de asistencia lingüística. Llame al [phone].    We comply with applicable federal civil rights laws and [place] laws. We do not discriminate on the basis of race, color, national origin, age, disability, sex, sexual orientation, or gender identity.            After Visit Summary       This is your record. Keep this with you and show to your community pharmacist(s) and doctor(s) at your next visit.  "

## 2017-11-27 NOTE — ED NOTES
Patient fell face first on 11/21 onto asphalt, experienced hematuria which has resolved.  Patient reports persistent RUQ pain which has been increasing.

## 2017-11-27 NOTE — ED AVS SNAPSHOT
[facility], [place], Emergency Department    [address]    [facility] [address]    Phone:  [phone]    Fax:  [phone]                                       Gucci Logan   MRN: [id number]    Department:  [facility], Emergency Department   Date of Visit:  11/27/2017           After Visit Summary Signature Page     I have received my discharge instructions, and my questions have been answered. I have discussed any challenges I see with this plan with the nurse or doctor.    ..........................................................................................................................................  Patient/Patient Representative Signature      ..........................................................................................................................................  Patient Representative Print Name and Relationship to Patient    ..................................................               ................................................  Date                                            Time    ..........................................................................................................................................  Reviewed by Signature/Title    ...................................................              ..............................................  Date                                                            Time

## 2017-11-27 NOTE — ED NOTES
Patient states that on November 21st, he fell face first landing on his abdomen.  He denies hitting his head.  He does state that he started to void blood the following day only.  He has had no further blood noted in his urine or urinary issues.  He states he has been eating and drinking ok but he does have nausea at times without emesis.  He denies diarrhea.  He is very talkative.

## 2017-11-27 NOTE — TELEPHONE ENCOUNTER
"Gucci Logan is a 67 year old male who calls with stomach pains.after a fall on 11/21/2017.    NURSING ASSESSMENT:    ADMITS: age > 30 with high blood pressure and high cholesterol and obesity.    DENIES: faint, unresponsiveness, severe weakness and inability to stand, cold, pale skin, or profuse sweating, severe, sudden pain radiating to back or legs, light-headedness, vomiting,   Description:  Fell on 11/21/2017 - landed on front - noticed reddish tinged urine the next day - cleared up urine is now normal - laid around for a week due to pain and then yesterday hung X-mas lights then today when woke up the pain is bad and seems to be getting worse, does have an abdominal hernia that has not been repaired, has a cold for last 12 years,   Onset/duration:  Started after fall on 11/21/2017  Precip. factors:  Fell face first on abdomin.   Associated symptoms: abdominal Pain, blood in urine  Improves/worsens symptoms:  nothing  Pain scale (0-10)   6/10  LMP/preg/breast feeding:  na  Last exam/Treatment:  11/17/2017  Allergies:   Allergies   Allergen Reactions     Influenza Vac Typ Other (See Comments)     Delirium, fever,       MEDICATIONS:   Taking medication(s) as prescribed? Yes  Taking over the counter medication(s?) Yes ibuprofen  Any medication side effects? No significant side effects    Any barriers to taking medication(s) as prescribed?  No  Medication(s) improving/managing symptoms?  No  Medication reconciliation completed: No      NURSING PLAN: Nursing advice to patient go to ER now - explained could have hurt his kidneys (blood in urine), hernia may be strangling his colon, liver or kidney - needs to be seen in ER to be evaluated. Did schedule appointment with PCP for Wednesday states will call and cancel if admitted to hospital.    RECOMMENDED DISPOSITION:  To ED, another person to drive - patient states will rest for awhile and then go into ER - \"probably go in tonight\"  Will comply with " recommendation: No- Barriers to comply with plan of care stubborn, denial that could be serious.  If further questions/concerns or if symptoms do not improve, worsen or new symptoms develop, call your PCP or [facility] as soon as possible.      Guideline used: abdominal pain, adult page 11 to 13  Telephone Triage Protocols for Nurses, Fifth Edition, Carissa Mistry RN

## 2017-11-28 NOTE — DISCHARGE INSTRUCTIONS
* Abdominal Pain,Uncertain Cause [Male]  Based on your visit today, the exact cause of your abdominal (stomach) pain is not clear. Your exam and tests do not indicate a dangerous cause at this time. However, the signs of a serious problem may take more time to appear. Although your exam was reassuring today, sometimes early in the course of many conditions, exam and lab tests can appear normal. Therefore, it is important for you to watch for any new symptoms or worsening of your condition.  Causes:  It may not be obvious what caused your symptoms. Pay attention to things that do seem to make your symptoms worse or better and discuss this with your doctor when you follow up.  Diagnosis:  The evaluation of abdominal pain in the emergency department may only require an exam by the doctor or it may include blood, urine or imaging studies, depending on many factors. Sometimes exams and tests can identify a cause but in many cases, a clear cause is not found. Further testing at follow up visits may help to suggest a clear diagnosis.  Home Care:    Rest as much as possible until your next exam.    Try to avoid any medications (unless otherwise directed by your doctor), foods, activities, or other factors that you may have contributed to your symptoms.    Try to eat foods that you know that you have tolerated well in the past. Certain diets may be recommended for some conditions that cause abdominal pain. However, since the cause of your symptoms may not be clear, discuss your diet more with your primary care provider or specialist for further recommendations.     Eating several small meals per day as opposed to 2 or 3 larger meals may help.    Monitor closely for anything that may make your symptoms worse or better. Pay close attention to symptoms below that may indicate worsening of your condition.  Follow Up And Precautions:  See your doctor or this facility as instructed (or sooner, if your symptoms are not  improving). In some cases, you may need more testing.  Contact Your Doctor Or Seek Medical Attention  if any of the following occur:    Pain is becoming worse    You are unable to take your medications because of too much vomiting    Swelling of the abdomen    Fever of 101 F (38.3 C) or higher, or as directed by your health care provider    Blood in vomit or bowel movements (dark red or black color)    Jaundice (yellow color of eyes and skin)    New onset of weakness, dizziness or fainting    New onset of chest, arm, back, neck or jaw pain    0426-5757 [employer]. [address] [place] [address]. All rights reserved. This information is not intended as a substitute for professional medical care. Always follow your healthcare professional's instructions.  This information has been modified by your health care provider with permission from the publisher.      Thank you for choosing [facility].     Please closely monitor for further symptoms. Return to the Emergency Department if you develop any new or worsening signs or symptoms.    If you received any opiate pain medications or sedatives during your visit, please do not drive for at least 8 hours.     Labs, cultures or final xray interpretations may still need to be reviewed.  We will call you if your plan of care needs to be changed.    Please follow up with your primary care physician or clinic for recheck in the next 1-2 weeks..

## 2017-12-01 ENCOUNTER — TELEPHONE (OUTPATIENT)
Dept: FAMILY MEDICINE | Facility: CLINIC | Age: 67
End: 2017-12-01

## 2017-12-01 DIAGNOSIS — K21.9 GASTROESOPHAGEAL REFLUX DISEASE, ESOPHAGITIS PRESENCE NOT SPECIFIED: Primary | ICD-10-CM

## 2017-12-01 DIAGNOSIS — J20.9 ACUTE BRONCHITIS, UNSPECIFIED ORGANISM: ICD-10-CM

## 2017-12-01 RX ORDER — AZITHROMYCIN 250 MG/1
TABLET, FILM COATED ORAL
Qty: 6 TABLET | Refills: 1 | Status: SHIPPED | OUTPATIENT
Start: 2017-12-01 | End: 2018-02-14

## 2017-12-02 NOTE — TELEPHONE ENCOUNTER
Patient left message for me.  Change ppi to h2 blocker.  Prescribe zpack.  See us in next couple weeks.    Richi Jaramillo MD

## 2018-02-14 ENCOUNTER — TELEPHONE (OUTPATIENT)
Dept: FAMILY MEDICINE | Facility: CLINIC | Age: 68
End: 2018-02-14

## 2018-02-14 DIAGNOSIS — J20.9 ACUTE BRONCHITIS, UNSPECIFIED ORGANISM: ICD-10-CM

## 2018-02-14 RX ORDER — AZITHROMYCIN 250 MG/1
TABLET, FILM COATED ORAL
Qty: 6 TABLET | Refills: 1 | Status: SHIPPED | OUTPATIENT
Start: 2018-02-14 | End: 2018-02-23

## 2018-02-14 NOTE — TELEPHONE ENCOUNTER
Patient sent me message.  Recurrent bronchitis/ sinus.    Sent in prescription.    Richi Jaramillo MD

## 2018-02-23 ENCOUNTER — OFFICE VISIT (OUTPATIENT)
Dept: FAMILY MEDICINE | Facility: CLINIC | Age: 68
End: 2018-02-23
Payer: COMMERCIAL

## 2018-02-23 VITALS
TEMPERATURE: 98 F | BODY MASS INDEX: 39.39 KG/M2 | DIASTOLIC BLOOD PRESSURE: 77 MMHG | HEART RATE: 66 BPM | OXYGEN SATURATION: 95 % | HEIGHT: 71 IN | WEIGHT: 281.38 LBS | SYSTOLIC BLOOD PRESSURE: 127 MMHG

## 2018-02-23 DIAGNOSIS — Z01.818 PREOP GENERAL PHYSICAL EXAM: Primary | ICD-10-CM

## 2018-02-23 DIAGNOSIS — M25.561 RIGHT KNEE PAIN, UNSPECIFIED CHRONICITY: ICD-10-CM

## 2018-02-23 DIAGNOSIS — H53.40 LOSS OF PART OF VISUAL FIELD: ICD-10-CM

## 2018-02-23 DIAGNOSIS — K21.9 GASTROESOPHAGEAL REFLUX DISEASE, ESOPHAGITIS PRESENCE NOT SPECIFIED: ICD-10-CM

## 2018-02-23 DIAGNOSIS — M79.10 MUSCLE PAIN: ICD-10-CM

## 2018-02-23 DIAGNOSIS — M25.512 LEFT SHOULDER PAIN, UNSPECIFIED CHRONICITY: ICD-10-CM

## 2018-02-23 LAB
ALBUMIN SERPL-MCNC: 3.5 G/DL (ref 3.4–5)
ALP SERPL-CCNC: 73 U/L (ref 40–150)
ALT SERPL W P-5'-P-CCNC: 84 U/L (ref 0–70)
ANION GAP SERPL CALCULATED.3IONS-SCNC: 7 MMOL/L (ref 3–14)
AST SERPL W P-5'-P-CCNC: 55 U/L (ref 0–45)
BASOPHILS # BLD AUTO: 0 10E9/L (ref 0–0.2)
BASOPHILS NFR BLD AUTO: 0.3 %
BILIRUB SERPL-MCNC: 1.8 MG/DL (ref 0.2–1.3)
BUN SERPL-MCNC: 15 MG/DL (ref 7–30)
CALCIUM SERPL-MCNC: 8.6 MG/DL (ref 8.5–10.1)
CHLORIDE SERPL-SCNC: 102 MMOL/L (ref 94–109)
CO2 SERPL-SCNC: 29 MMOL/L (ref 20–32)
CREAT SERPL-MCNC: 0.91 MG/DL (ref 0.66–1.25)
DIFFERENTIAL METHOD BLD: ABNORMAL
EOSINOPHIL # BLD AUTO: 0.1 10E9/L (ref 0–0.7)
EOSINOPHIL NFR BLD AUTO: 2.2 %
ERYTHROCYTE [DISTWIDTH] IN BLOOD BY AUTOMATED COUNT: 12.8 % (ref 10–15)
GFR SERPL CREATININE-BSD FRML MDRD: 83 ML/MIN/1.7M2
GLUCOSE SERPL-MCNC: 286 MG/DL (ref 70–99)
HCT VFR BLD AUTO: 46.6 % (ref 40–53)
HGB BLD-MCNC: 15.8 G/DL (ref 13.3–17.7)
LYMPHOCYTES # BLD AUTO: 1 10E9/L (ref 0.8–5.3)
LYMPHOCYTES NFR BLD AUTO: 16.3 %
MCH RBC QN AUTO: 33 PG (ref 26.5–33)
MCHC RBC AUTO-ENTMCNC: 33.9 G/DL (ref 31.5–36.5)
MCV RBC AUTO: 97 FL (ref 78–100)
MONOCYTES # BLD AUTO: 0.7 10E9/L (ref 0–1.3)
MONOCYTES NFR BLD AUTO: 10.4 %
NEUTROPHILS # BLD AUTO: 4.5 10E9/L (ref 1.6–8.3)
NEUTROPHILS NFR BLD AUTO: 70.8 %
PLATELET # BLD AUTO: 99 10E9/L (ref 150–450)
POTASSIUM SERPL-SCNC: 3.7 MMOL/L (ref 3.4–5.3)
PROT SERPL-MCNC: 7.7 G/DL (ref 6.8–8.8)
RBC # BLD AUTO: 4.79 10E12/L (ref 4.4–5.9)
SODIUM SERPL-SCNC: 138 MMOL/L (ref 133–144)
WBC # BLD AUTO: 6.4 10E9/L (ref 4–11)

## 2018-02-23 PROCEDURE — 85025 COMPLETE CBC W/AUTO DIFF WBC: CPT | Performed by: FAMILY MEDICINE

## 2018-02-23 PROCEDURE — 36415 COLL VENOUS BLD VENIPUNCTURE: CPT | Performed by: FAMILY MEDICINE

## 2018-02-23 PROCEDURE — 80053 COMPREHEN METABOLIC PANEL: CPT | Performed by: FAMILY MEDICINE

## 2018-02-23 PROCEDURE — 99215 OFFICE O/P EST HI 40 MIN: CPT | Performed by: FAMILY MEDICINE

## 2018-02-23 RX ORDER — HYDROCODONE BITARTRATE AND ACETAMINOPHEN 5; 325 MG/1; MG/1
1 TABLET ORAL EVERY 6 HOURS PRN
Qty: 30 TABLET | Refills: 0 | Status: SHIPPED | OUTPATIENT
Start: 2018-02-23 | End: 2018-03-29

## 2018-02-23 RX ORDER — TIZANIDINE 2 MG/1
2 TABLET ORAL 3 TIMES DAILY PRN
Qty: 90 TABLET | Refills: 1 | Status: SHIPPED | OUTPATIENT
Start: 2018-02-23 | End: 2018-08-17

## 2018-02-23 ASSESSMENT — PAIN SCALES - GENERAL: PAINLEVEL: NO PAIN (0)

## 2018-02-23 NOTE — LETTER
[facility]  [address] [place]  [address]  [phone]        February 26, 2018    Gucci Logan  [address] [facility] [address]        Dear Gucci,    Delma pre-op labs are stable     Results for orders placed or performed in visit on 02/23/18   Comprehensive metabolic panel   Result Value Ref Range    Sodium 138 133 - 144 mmol/L    Potassium 3.7 3.4 - 5.3 mmol/L    Chloride 102 94 - 109 mmol/L    Carbon Dioxide 29 20 - 32 mmol/L    Anion Gap 7 3 - 14 mmol/L    Glucose 286 (H) 70 - 99 mg/dL    Urea Nitrogen 15 7 - 30 mg/dL    Creatinine 0.91 0.66 - 1.25 mg/dL    GFR Estimate 83 >60 mL/min/1.7m2    GFR Estimate If Black >90 >60 mL/min/1.7m2    Calcium 8.6 8.5 - 10.1 mg/dL    Bilirubin Total 1.8 (H) 0.2 - 1.3 mg/dL    Albumin 3.5 3.4 - 5.0 g/dL    Protein Total 7.7 6.8 - 8.8 g/dL    Alkaline Phosphatase 73 40 - 150 U/L    ALT 84 (H) 0 - 70 U/L    AST 55 (H) 0 - 45 U/L   CBC with platelets differential   Result Value Ref Range    WBC 6.4 4.0 - 11.0 10e9/L    RBC Count 4.79 4.4 - 5.9 10e12/L    Hemoglobin 15.8 13.3 - 17.7 g/dL    Hematocrit 46.6 40.0 - 53.0 %    MCV 97 78 - 100 fl    MCH 33.0 26.5 - 33.0 pg    MCHC 33.9 31.5 - 36.5 g/dL    RDW 12.8 10.0 - 15.0 %    Platelet Count 99 (L) 150 - 450 10e9/L    Diff Method Automated Method     % Neutrophils 70.8 %    % Lymphocytes 16.3 %    % Monocytes 10.4 %    % Eosinophils 2.2 %    % Basophils 0.3 %    Absolute Neutrophil 4.5 1.6 - 8.3 10e9/L    Absolute Lymphocytes 1.0 0.8 - 5.3 10e9/L    Absolute Monocytes 0.7 0.0 - 1.3 10e9/L    Absolute Eosinophils 0.1 0.0 - 0.7 10e9/L    Absolute Basophils 0.0 0.0 - 0.2 10e9/L       If you have any questions please call the clinic at [phone].    Sincerely,    Richi RAMOSL

## 2018-02-23 NOTE — PATIENT INSTRUCTIONS
Before Your Surgery      Call your surgeon if there is any change in your health. This includes signs of a cold or flu (such as a sore throat, runny nose, cough, rash or fever).    Do not smoke, drink alcohol or take over the counter medicine (unless your surgeon or primary care doctor tells you to) for the 24 hours before and after surgery.    If you take prescribed drugs: Follow your doctor s orders about which medicines to take and which to stop until after surgery.    Eating and drinking prior to surgery: follow the instructions from your surgeon    Take a shower or bath the night before surgery. Use the soap your surgeon gave you to gently clean your skin. If you do not have soap from your surgeon, use your regular soap. Do not shave or scrub the surgery site.  Wear clean pajamas and have clean sheets on your bed.         We will send you lab results

## 2018-02-23 NOTE — PROGRESS NOTES
[address] [address] [phone]  Dept: [phone]    PRE-OP EVALUATION:  Today's date: 2018    Gucci Logan (: 1950) presents for pre-operative evaluation assessment as requested by Dr. Fabian Koch.  He requires evaluation and anesthesia risk assessment prior to undergoing surgery/procedure for treatment of eye.  Blepharoplasty    Fax number for surgical facility: [facility]-[phone]  Primary Physician: Richi Jaramillo  Type of Anesthesia Anticipated: Local    Patient has a Health Care Directive or Living Will:  YES     Preop Questions 2018   Who is doing your surgery? roderick rivas   What are you having done? eye   Date of Surgery/Procedure: 2018   1.  Do you have a history of Heart attack, stroke, stent, coronary bypass surgery, or other heart surgery? No   2.  Do you ever have any pain or discomfort in your chest? No   3.  Do you have a history of  Heart Failure? No   4.   Are you troubled by shortness of breath when:  walking on a level surface, or up a slight hill, or at night? No   5.  Do you currently have a cold, bronchitis or other respiratory infection? No   6.  Do you have a cough, shortness of breath, or wheezing? No   7.  Do you sometimes get pains in the calves of your legs when you walk? YES -    8. Do you or anyone in your family have previous history of blood clots? No   9.  Do you or does anyone in your family have a serious bleeding problem such as prolonged bleeding following surgeries or cuts? No   10. Have you ever had problems with anemia or been told to take iron pills? No   11. Have you had any abnormal blood loss such as black, tarry or bloody stools? No   12. Have you ever had a blood transfusion? 1960   13. Have you or any of your relatives ever had problems with anesthesia? No   14. Do you have sleep apnea, excessive snoring or daytime drowsiness? YES -    15.  Do you have any prosthetic heart valves? No   16. Do you have prosthetic joints? No         HPI:     HPI related to upcoming procedure: 67 year old male with recent cataract procedures.  Now to have eyelid procedure both eyes.           MEDICAL HISTORY:     Patient Active Problem List    Diagnosis Date Noted     Hyperlipidemia LDL goal <100 04/19/2017     Priority: Medium     Impaired fasting glucose 04/19/2017     Priority: Medium     Gastroesophageal reflux disease, esophagitis presence not specified 10/06/2016     Priority: Medium     Obesity, unspecified obesity severity, unspecified obesity type 12/17/2015     Priority: Medium     Anxiety 11/27/2014     Priority: Medium     Hypertension goal BP (blood pressure) < 140/90 02/08/2013     Priority: Medium     Advanced directives, counseling/discussion 01/16/2013     Priority: Medium     Advance Care Planning:   ACP Review and Resources Provided:  Reviewed chart for advance care plan.  Gucci MARTÍNEZ Koffihyun has no plan or code status on file however states presence of ACP document. Copy requested. Confirmed code status reflects current choices pending receipt of document/advance care plan review. Confirmed/documented designated decision maker(s). See permanent comments section of demographics in clinical tab.   Added by Elyssa Olguin on 7/22/2014  Patient states has Advance Directive and will bring in a copy to clinic. 1/16/2013              CARDIOVASCULAR SCREENING; LDL GOAL LESS THAN 130 01/16/2013     Priority: Medium      Past Medical History:   Diagnosis Date     Arthritis      Esophageal reflux 3/1/2014     Hearing problem      Hiatal hernia      Hypertension      Liver disease      Obesity 1/24/2013     Obstructive sleep apnea      Sleep apnea     Advised CPAP machine. Not keen to use it.      Past Surgical History:   Procedure Laterality Date     ARTHROSCOPY KNEE RT/LT      (L) with partial medial meniscectomy     C OPEN RX ANKLE DISLOCATN+FIXATN       (R)     C SPINAL FUSION,ANT,EA ADNL LEVEL      T12 - L1     ENDOSCOPIC ENDONASAL SURGERY  1994     ENDOSCOPY  2-19-15     EYE SURGERY       Hernia surgery Left 1994     NASAL/SINUS POLYPECTOMY       ROTATOR CUFF REPAIR RT/LT Right    cataracts done Nov and Dec 2017      Current Outpatient Prescriptions   Medication Sig Dispense Refill     cyclobenzaprine (FLEXERIL) 10 MG tablet TAKE ONE TABLET BY MOUTH THREE TIMES DAILY AS NEEDED FOR MUSCLE SPASM 90 tablet 0     ranitidine (ZANTAC) 150 MG tablet Take 1 tablet (150 mg) by mouth 2 times daily 60 tablet 1     Multiple Vitamins-Minerals (MULTIVITAMIN ADULT PO)        HYDROcodone-acetaminophen (NORCO) 5-325 MG per tablet Take 1 tablet by mouth every 6 hours as needed for moderate to severe pain 30 tablet 0     hydrochlorothiazide (HYDRODIURIL) 25 MG tablet Take 1 tablet (25 mg) by mouth daily 90 tablet 1     losartan (COZAAR) 100 MG tablet Take 1 tablet (100 mg) by mouth daily 90 tablet 1     amLODIPine (NORVASC) 2.5 MG tablet Take 1 tablet (2.5 mg) by mouth daily 90 tablet 1     diazepam (VALIUM) 5 MG tablet 1/2 to 1 po every 6 hours as needed for anxiety 10 tablet 0     metoprolol (TOPROL-XL) 50 MG 24 hr tablet Take 1 tablet (50 mg) by mouth daily 90 tablet 1     LYRICA 50 MG capsule TAKE ONE CAPSULE BY MOUTH THREE TIMES DAILY 90 capsule 5     vitamin E 400 UNIT capsule Take 1 capsule (400 Units) by mouth daily 30 capsule      Cholecalciferol (VITAMIN D) 2000 UNITS CAPS 1 po daily 90 capsule 3     OTC products: None, except as noted above    Allergies   Allergen Reactions     Influenza Vac Typ Other (See Comments)     Delirium, fever      Latex Allergy: NO    Social History   Substance Use Topics     Smoking status: Former Smoker     Years: 5.00     Types: Cigarettes     Start date: 1/1/1990     Quit date: 1/1/1999     Smokeless tobacco: Never Used     Alcohol use No      Comment: quit in 1999     History   Drug Use No       REVIEW OF SYSTEMS:   Constitutional, neuro,  "ENT, endocrine, pulmonary, cardiac, gastrointestinal, genitourinary, musculoskeletal, integument and psychiatric systems are negative, except as otherwise noted.    Getting over a cold          EXAM:   /77 (BP Location: Left arm, Patient Position: Chair, Cuff Size: Adult Regular)  Pulse 66  Temp 98  F (36.7  C) (Oral)  Ht 5' 11\" (1.803 m)  Wt 281 lb 6 oz (127.6 kg)  SpO2 95%  BMI 39.24 kg/m2    GENERAL APPEARANCE: healthy, alert and no distress     HENT: ear canals and TM's normal and nose and mouth without ulcers or lesions     NECK: no adenopathy, no asymmetry, masses, or scars and thyroid normal to palpation     RESP: lungs clear to auscultation - no rales, rhonchi or wheezes     CV: regular rates and rhythm, normal S1 S2, no S3 or S4 and no murmur, click or rub     ABDOMEN:  soft, nontender, no HSM or masses and bowel sounds normal     ABDOMEN: umbilical and ventral hernia present     MS: extremities normal- no gross deformities noted, no evidence of inflammation in joints, FROM in all extremities.     SKIN: no suspicious lesions or rashes     NEURO: Normal strength and tone, sensory exam grossly normal, mentation intact and speech normal     PSYCH: mentation appears normal. and affect normal/bright     LYMPHATICS: No cervical adenopathy    DIAGNOSTICS:   No ekg needed for eyelid procedure        Recent Labs   Lab Test  11/27/17   1730  11/17/17   1001  08/18/17   0841  04/20/17   0913   06/24/15   0953   HGB  15.8  15.3  14.9  15.3   < >  14.6   PLT  124*  87*  81*  76*   < >  77*   INR  1.05   --    --    --    --   1.05   NA  140   --   140  141   < >  143   POTASSIUM  4.0  4.4  3.7  4.3   < >  4.0   CR  1.07  1.04  1.03  0.83   < >  0.97   A1C   --    --   7.3*  7.6*   < >  5.9    < > = values in this interval not displayed.        IMPRESSION:   Reason for surgery/procedure: needs bilateral blepharoplasty  Diagnosis/reason for consult: pre-op clearance    The proposed surgical procedure is " considered LOW risk.    REVISED CARDIAC RISK INDEX  The patient has the following serious cardiovascular risks for perioperative complications such as (MI, PE, VFib and 3  AV Block):  No serious cardiac risks  INTERPRETATION: 0 risks: Class I (very low risk - 0.4% complication rate)    The patient has the following additional risks for perioperative complications:  No identified additional risks      ICD-10-CM    1. Preop general physical exam Z01.818        RECOMMENDATIONS:          --Patient is to take all scheduled medications on the day of surgery EXCEPT for modifications listed below.    Patient will take the usual am meds.    APPROVAL GIVEN to proceed with proposed procedure, without further diagnostic evaluation, providing labs are okay.    Of note, no history of coronary artery disease.    No new chest pain/ breathing problems    He does have mild/ borderline diabetes with last hemoglobin a1c 7.3, but no medications needed    Blood pressure controlled    He had no problems with recent cataract procedures                 Signed Electronically by: Richi Jaramillo MD    Copy of this evaluation report is provided to requesting physician.    [facility] Preop Guidelines    Addendum: patient needed a few refills.  See orders.  Richi Jaramillo MD

## 2018-02-23 NOTE — MR AVS SNAPSHOT
After Visit Summary   2/23/2018    Gucci Logan    MRN: [id number]           Patient Information     Date Of Birth          1950        Visit Information        Provider Department      2/23/2018 8:40 AM Richi Jaramillo MD [facility]        Today's Diagnoses     Preop general physical exam    -  1    Loss of part of visual field        Left shoulder pain, unspecified chronicity        Right knee pain, unspecified chronicity        Gastroesophageal reflux disease, esophagitis presence not specified        Muscle pain          Care Instructions      Before Your Surgery      Call your surgeon if there is any change in your health. This includes signs of a cold or flu (such as a sore throat, runny nose, cough, rash or fever).    Do not smoke, drink alcohol or take over the counter medicine (unless your surgeon or primary care doctor tells you to) for the 24 hours before and after surgery.    If you take prescribed drugs: Follow your doctor s orders about which medicines to take and which to stop until after surgery.    Eating and drinking prior to surgery: follow the instructions from your surgeon    Take a shower or bath the night before surgery. Use the soap your surgeon gave you to gently clean your skin. If you do not have soap from your surgeon, use your regular soap. Do not shave or scrub the surgery site.  Wear clean pajamas and have clean sheets on your bed.         We will send you lab results              Follow-ups after your visit        Who to contact     If you have questions or need follow up information about today's clinic visit or your schedule please contact [facility] directly at [phone].  Normal or non-critical lab and imaging results will be communicated to you by MyChart, letter or phone within 4 business days after the clinic has received the results. If you do not hear from us within 7 days, please contact the clinic  "through Re2you or phone. If you have a critical or abnormal lab result, we will notify you by phone as soon as possible.  Submit refill requests through Re2you or call your pharmacy and they will forward the refill request to us. Please allow 3 business days for your refill to be completed.          Additional Information About Your Visit        Re2you Information     Re2you lets you send messages to your doctor, view your test results, renew your prescriptions, schedule appointments and more. To sign up, go to [email or web address] . Click on \"Log in\" on the left side of the screen, which will take you to the Welcome page. Then click on \"Sign up Now\" on the right side of the page.     You will be asked to enter the access code listed below, as well as some personal information. Please follow the directions to create your username and password.     Your access code is: [email or web address]  Expires: 2018  9:23 AM     Your access code will  in 90 days. If you need help or a new code, please call your [facility] clinic or [phone].        Care EveryWhere ID     This is your Care EveryWhere ID. This could be used by other organizations to access your [facility] medical records  [id number]        Your Vitals Were     Pulse Temperature Height Pulse Oximetry BMI (Body Mass Index)       66 98  F (36.7  C) (Oral) 5' 11\" (1.803 m) 95% 39.24 kg/m2        Blood Pressure from Last 3 Encounters:   18 127/77   17 151/84   17 139/79    Weight from Last 3 Encounters:   18 281 lb 6 oz (127.6 kg)   17 287 lb (130.2 kg)   10/11/17 285 lb 8 oz (129.5 kg)              We Performed the Following     CBC with platelets differential     Comprehensive metabolic panel          Today's Medication Changes          These changes are accurate as of 18  9:23 AM.  If you have any questions, ask your nurse or doctor.               Start taking these medicines.        Dose/Directions    tiZANidine 2 MG " tablet   Commonly known as:  ZANAFLEX   Used for:  Muscle pain   Started by:  Richi Jaramillo MD        Dose:  2 mg   Take 1 tablet (2 mg) by mouth 3 times daily as needed for muscle spasms   Quantity:  90 tablet   Refills:  1         Stop taking these medicines if you haven't already. Please contact your care team if you have questions.     omeprazole 20 MG CR capsule   Commonly known as:  priLOSEC   Stopped by:  Richi Jaramillo MD                Where to get your medicines      These medications were sent to [facility] [place] [facility] [address] [place] [address] [address], [address]  [address] [place] [address], [address], [facility] [address]     Phone:  [phone]     ranitidine 150 MG tablet    tiZANidine 2 MG tablet         Some of these will need a paper prescription and others can be bought over the counter.  Ask your nurse if you have questions.     Bring a paper prescription for each of these medications     HYDROcodone-acetaminophen 5-325 MG per tablet                Primary Care Provider Office Phone # Fax #    Richi Jaramillo [phone]       [address] [place] [address]        Equal Access to Services     CHRISSY [facility] St[facility] Memorial HospitalGLENDA AH: Hadii gonzalo ku hadasho Soomaali, waaxda luqadaha, qaybta kaalmada adeegyada, shannan layton haymoises herrera . So [facility] [phone].    ATENCIÓN: Si habla español, tiene a khoury disposición servicios gratuitos de asistencia lingüística. Ll[facility] [phone].    We comply with applicable federal civil rights laws and [place] laws. We do not discriminate on the basis of race, color, national origin, age, disability, sex, sexual orientation, or gender identity.            Thank you!     Thank you for choosing [facility]  for your care. Our goal is always to provide you with excellent care. Hearing back from our patients is one way we can continue to improve our services. Please take a few minutes to complete the written survey that you may  receive in the mail after your visit with us. Thank you!             Your Updated Medication List - Protect others around you: Learn how to safely use, store and throw away your medicines at www.disposemymeds.org.          This list is accurate as of 2/23/18  9:23 AM.  Always use your most recent med list.                   Brand Name Dispense Instructions for use Diagnosis    amLODIPine 2.5 MG tablet    NORVASC    90 tablet    Take 1 tablet (2.5 mg) by mouth daily    Essential hypertension with goal blood pressure less than 140/90       cyclobenzaprine 10 MG tablet    FLEXERIL    90 tablet    TAKE ONE TABLET BY MOUTH THREE TIMES DAILY AS NEEDED FOR MUSCLE SPASM    Muscle pain       diazepam 5 MG tablet    VALIUM    10 tablet    1/2 to 1 po every 6 hours as needed for anxiety    Anxiety       hydrochlorothiazide 25 MG tablet    HYDRODIURIL    90 tablet    Take 1 tablet (25 mg) by mouth daily    Bilateral lower extremity edema       HYDROcodone-acetaminophen 5-325 MG per tablet    NORCO    30 tablet    Take 1 tablet by mouth every 6 hours as needed for moderate to severe pain    Left shoulder pain, unspecified chronicity, Right knee pain, unspecified chronicity       losartan 100 MG tablet    COZAAR    90 tablet    Take 1 tablet (100 mg) by mouth daily    Hypertension goal BP (blood pressure) < 140/90       LYRICA 50 MG capsule   Generic drug:  pregabalin     90 capsule    TAKE ONE CAPSULE BY MOUTH THREE TIMES DAILY    Neuropathy       metoprolol succinate 50 MG 24 hr tablet    TOPROL-XL    90 tablet    Take 1 tablet (50 mg) by mouth daily    Hypertension goal BP (blood pressure) < 140/90       MULTIVITAMIN ADULT PO           ranitidine 150 MG tablet    ZANTAC    180 tablet    Take 1 tablet (150 mg) by mouth 2 times daily    Gastroesophageal reflux disease, esophagitis presence not specified       tiZANidine 2 MG tablet    ZANAFLEX    90 tablet    Take 1 tablet (2 mg) by mouth 3 times daily as needed for muscle  spasms    Muscle pain       vitamin D 2000 UNITS Caps     90 capsule    1 po daily    Vitamin D deficiency disease       vitamin E 400 UNIT capsule     30 capsule    Take 1 capsule (400 Units) by mouth daily

## 2018-03-07 ENCOUNTER — TELEPHONE (OUTPATIENT)
Dept: FAMILY MEDICINE | Facility: CLINIC | Age: 68
End: 2018-03-07

## 2018-03-07 ENCOUNTER — CARE COORDINATION (OUTPATIENT)
Dept: SURGERY | Facility: CLINIC | Age: 68
End: 2018-03-07

## 2018-03-07 DIAGNOSIS — K43.9 VENTRAL HERNIA WITHOUT OBSTRUCTION OR GANGRENE: ICD-10-CM

## 2018-03-07 DIAGNOSIS — K21.9 GASTROESOPHAGEAL REFLUX DISEASE WITHOUT ESOPHAGITIS: ICD-10-CM

## 2018-03-07 DIAGNOSIS — K42.9 UMBILICAL HERNIA WITHOUT OBSTRUCTION AND WITHOUT GANGRENE: Primary | ICD-10-CM

## 2018-03-07 NOTE — TELEPHONE ENCOUNTER
Routing to PCP to review and advise.    Patient requesting PCP call him.    Tika Mistry RN  [facility]

## 2018-03-07 NOTE — TELEPHONE ENCOUNTER
Reason for Call:  Other     Detailed comments: patient called stated that he has an appointment with Dr. Munson for 3/12/18 at 9:30 and says thank you to Dr Jaramillo and the staff for all the help and he will call in later after his appointment.    Phone Number Patient can be reached at: Home number on file [phone] (home)    Best Time: any    Can we leave a detailed message on this number? YES    Call taken on 3/7/2018 at 3:42 PM by Katie Nassar

## 2018-03-07 NOTE — TELEPHONE ENCOUNTER
I called and discussed in detail.  Did referral for gen surg.  Also h2 blocker not working, so sent in ppi prescription.  Can restart that.  Richi Jaramillo MD

## 2018-03-07 NOTE — PROGRESS NOTES
Patient called and had questions about his insurance and if it would cover hernia surgery. Advised patient to call his insurance to discuss. He said he will. He would like to be scheduled for a consult with Dr. Munson. He is scheduled for 3/12 at 9:30. GS number provided and he can call if he needs to cancel or reschedule.

## 2018-03-07 NOTE — TELEPHONE ENCOUNTER
Patient returning call. Informed patient that Dr. Jaramillo was currently with another patient. Please call patient back at [phone]    Thank you  Lorrie Watson  Patient Representative

## 2018-03-07 NOTE — TELEPHONE ENCOUNTER
Reason for Call:  Other call back    Detailed comments: Patient stopped in at  to ask Dr. Jaramillo to call him with the name of the general surgeon he recommended for patient's herias.  Patient lost the name of the doc. Please call Canelo back at the number listed below (as soon as you can).    Phone Number Patient can be reached at:  [phone]    Best Time: anytime    Can we leave a detailed message on this number? YES    Call taken on 3/7/2018 at 11:51 AM by Nadine Velasco

## 2018-03-09 ENCOUNTER — TELEPHONE (OUTPATIENT)
Dept: SURGERY | Facility: CLINIC | Age: 68
End: 2018-03-09

## 2018-03-09 NOTE — TELEPHONE ENCOUNTER
Pre Visit Call and Assessment    Date of call:  03/09/2018    Phone numbers:  Home number on file [phone] (home)    Reached patient/confirmed appointment: No - home phone did not have voicemail and cell phone was busy.  Patient care team/Primary provider:  Richi Jaramillo  Referred to:  Dr. Santosh Munson    Reason for visit:  New hernia consult

## 2018-03-12 ENCOUNTER — OFFICE VISIT (OUTPATIENT)
Dept: SURGERY | Facility: CLINIC | Age: 68
End: 2018-03-12
Payer: COMMERCIAL

## 2018-03-12 VITALS
OXYGEN SATURATION: 94 % | WEIGHT: 288.6 LBS | HEART RATE: 70 BPM | BODY MASS INDEX: 40.4 KG/M2 | SYSTOLIC BLOOD PRESSURE: 162 MMHG | DIASTOLIC BLOOD PRESSURE: 79 MMHG | HEIGHT: 71 IN | TEMPERATURE: 98.4 F

## 2018-03-12 DIAGNOSIS — K42.9 UMBILICAL HERNIA WITHOUT OBSTRUCTION AND WITHOUT GANGRENE: Primary | ICD-10-CM

## 2018-03-12 RX ORDER — AZITHROMYCIN 250 MG/1
250 TABLET, FILM COATED ORAL
COMMUNITY
End: 2018-03-14

## 2018-03-12 ASSESSMENT — PAIN SCALES - GENERAL: PAINLEVEL: MILD PAIN (2)

## 2018-03-12 NOTE — PATIENT INSTRUCTIONS
Dr. Santosh Munson would like you to lose weight to a BMI of 30 and see you in approximately 6 months.  Please call [phone] to arrange an office visit.  1.  Patient must have a BMI of less than 30.  Resources to assist patient include:  *Medical Weight Management: [phone]  Providers:  Dr. Barton, Dr. Hill, Dr. Tucker, Dr. Walls.

## 2018-03-12 NOTE — NURSING NOTE
"Chief Complaint   Patient presents with     Clinic Care Coordination - Initial     New pt consult, hernia surgery.        Vitals:    03/12/18 0925   BP: 162/79   BP Location: Left arm   Patient Position: Chair   Cuff Size: Adult Large   Pulse: 70   Temp: 98.4  F (36.9  C)   TempSrc: Oral   SpO2: 94%   Weight: 288 lb 9.6 oz   Height: 5' 11\"       Body mass index is 40.25 kg/(m^2).    Pop HU LPN                  "

## 2018-03-12 NOTE — MR AVS SNAPSHOT
After Visit Summary   3/12/2018    Gucci Logan    MRN: [id number]           Patient Information     Date Of Birth          1950        Visit Information        Provider Department      3/12/2018 9:30 AM Santosh Munson MD [facility]        Today's Diagnoses     Umbilical hernia without obstruction and without gangrene    -  1      Care Instructions    Dr. Santosh Munson would like you to lose weight to a BMI of 30 and see you in approximately 6 months.  Please call [phone] to arrange an office visit.  1.  Patient must have a BMI of less than 30.  Resources to assist patient include:  *Medical Weight Management: [phone]  Providers:  Dr. Barton, Dr. Hill, Dr. Tucker, Dr. Walls.               Follow-ups after your visit        Who to contact     Please call your clinic at [phone] to:    Ask questions about your health    Make or cancel appointments    Discuss your medicines    Learn about your test results    Speak to your doctor            Additional Information About Your Visit        [facility] Information     [employer] is an electronic gateway that provides easy, online access to your medical records. With [employer], you can request a clinic appointment, read your test results, renew a prescription or communicate with your care team.     To sign up for [employer] visit the website at [email or web address]   You will be asked to enter the access code listed below, as well as some personal information. Please follow the directions to create your username and password.     Your access code is: [email or web address]  Expires: 2018 10:23 AM     Your access code will  in 90 days. If you need help or a new code, please contact your [facility] or call [phone] for assistance.        Care EveryWhere ID     This is your Care EveryWhere ID. This could be used by other organizations to access your [facility]  "records  [id number]        Your Vitals Were     Pulse Temperature Height Pulse Oximetry BMI (Body Mass Index)       70 98.4  F (36.9  C) (Oral) 5' 11\" 94% 40.25 kg/m2        Blood Pressure from Last 3 Encounters:   03/12/18 162/79   02/23/18 127/77   11/27/17 151/84    Weight from Last 3 Encounters:   03/12/18 288 lb 9.6 oz   02/23/18 281 lb 6 oz   11/17/17 287 lb              Today, you had the following     No orders found for display       Primary Care Provider Office Phone # Fax #    Richi Jaramillo [phone]       [address] [facility] [address]        Equal Access to Services     ARTHUR FERNANDEZ : Hadii gonzalo delgadoo Sosylvester, waaxda luqadaha, qaybta kaalmada adeegyada, shannan herrera . So [facility] [phone].    ATENCIÓN: Si habla español, tiene a khoury disposición servicios gratuitos de asistencia lingüística. Ll[facility] [phone].    We comply with applicable federal civil rights laws and [place] laws. We do not discriminate on the basis of race, color, national origin, age, disability, sex, sexual orientation, or gender identity.            Thank you!     Thank you for choosing [facility]  for your care. Our goal is always to provide you with excellent care. Hearing back from our patients is one way we can continue to improve our services. Please take a few minutes to complete the written survey that you may receive in the mail after your visit with us. Thank you!             Your Updated Medication List - Protect others around you: Learn how to safely use, store and throw away your medicines at www.disposemymeds.org.          This list is accurate as of 3/12/18 12:52 PM.  Always use your most recent med list.                   Brand Name Dispense Instructions for use Diagnosis    amLODIPine 2.5 MG tablet    NORVASC    90 tablet    Take 1 tablet (2.5 mg) by mouth daily    Essential hypertension with goal blood pressure less than " 140/90       azithromycin 250 MG tablet    ZITHROMAX     Take 250 mg by mouth        cyclobenzaprine 10 MG tablet    FLEXERIL    90 tablet    TAKE ONE TABLET BY MOUTH THREE TIMES DAILY AS NEEDED FOR MUSCLE SPASM    Muscle pain       diazepam 5 MG tablet    VALIUM    10 tablet    1/2 to 1 po every 6 hours as needed for anxiety    Anxiety       hydrochlorothiazide 25 MG tablet    HYDRODIURIL    90 tablet    Take 1 tablet (25 mg) by mouth daily    Bilateral lower extremity edema       HYDROcodone-acetaminophen 5-325 MG per tablet    NORCO    30 tablet    Take 1 tablet by mouth every 6 hours as needed for moderate to severe pain    Left shoulder pain, unspecified chronicity, Right knee pain, unspecified chronicity       losartan 100 MG tablet    COZAAR    90 tablet    Take 1 tablet (100 mg) by mouth daily    Hypertension goal BP (blood pressure) < 140/90       LYRICA 50 MG capsule   Generic drug:  pregabalin     90 capsule    TAKE ONE CAPSULE BY MOUTH THREE TIMES DAILY    Neuropathy       metoprolol succinate 50 MG 24 hr tablet    TOPROL-XL    90 tablet    Take 1 tablet (50 mg) by mouth daily    Hypertension goal BP (blood pressure) < 140/90       MULTIVITAMIN ADULT PO           omeprazole 20 MG CR capsule    priLOSEC    90 capsule    TAKE ONE CAPSULE BY MOUTH ONCE DAILY.  TAKE 30-60 MINUTES BEFORE A MEAL.    Gastroesophageal reflux disease without esophagitis       tiZANidine 2 MG tablet    ZANAFLEX    90 tablet    Take 1 tablet (2 mg) by mouth 3 times daily as needed for muscle spasms    Muscle pain       vitamin D 2000 UNITS Caps     90 capsule    1 po daily    Vitamin D deficiency disease

## 2018-03-12 NOTE — LETTER
3/12/2018       RE: Gucci Logan  [address] [facility] [address]     Dear Colleague,    Thank you for referring your patient, Gucci Logan, to the [facility] GENERAL SURGERY at [facility]. Please see a copy of my visit note below.    Gucci Logan is a 67 year old male with a many year history of an umbilical hernia with the following symptoms of lump.    Finding was not work related.  Onset did not occur with lifting.  Obstructive symptoms:  no  Urinary difficulties:  no  Chronic cough: no  Constipation:  no  Current level of activity:  Low,though trying to increase.    Past medical history, medications, allergies, family history, and social history were reviewed with the patient.  Patient Active Problem List   Diagnosis     Advanced directives, counseling/discussion     CARDIOVASCULAR SCREENING; LDL GOAL LESS THAN 130     Hypertension goal BP (blood pressure) < 140/90     Anxiety     Obesity, unspecified obesity severity, unspecified obesity type     Gastroesophageal reflux disease, esophagitis presence not specified     Hyperlipidemia LDL goal <100     Impaired fasting glucose     Current Outpatient Prescriptions   Medication     azithromycin (ZITHROMAX) 250 MG tablet     omeprazole (PRILOSEC) 20 MG CR capsule     HYDROcodone-acetaminophen (NORCO) 5-325 MG per tablet     tiZANidine (ZANAFLEX) 2 MG tablet     cyclobenzaprine (FLEXERIL) 10 MG tablet     Multiple Vitamins-Minerals (MULTIVITAMIN ADULT PO)     hydrochlorothiazide (HYDRODIURIL) 25 MG tablet     losartan (COZAAR) 100 MG tablet     amLODIPine (NORVASC) 2.5 MG tablet     diazepam (VALIUM) 5 MG tablet     metoprolol (TOPROL-XL) 50 MG 24 hr tablet     LYRICA 50 MG capsule     Cholecalciferol (VITAMIN D) 2000 UNITS CAPS     No current facility-administered medications for this visit.      ROS: 10 point review of systems negative except noted in HPI  PHYSICAL EXAM  General appearance- healthy,  alert, and in no distress.  Skin- Skin color and turgor normal.  No obvious rashes.  Neck- Neck is supple with no obvious adenopathy.  Lungs- Respiratory effort unlabored.  Gait- Normal.  Abdomen - obese, soft non distended, non tender without obvious masses, except for wide based umbilical hernia easily reduced.  Impression: Umbilical hernia low risk for watchful waiting given patient weight.  Rec: Abdominal binder when active, weight loss progress, return to see me when weight closer to 30 BMI.  The total time spent with this patient was 30 minutes.  Of this time, greater than 50% was spent counseling and coordinating care.            Again, thank you for allowing me to participate in the care of your patient.      Sincerely,    Santosh Munson MD

## 2018-03-12 NOTE — PROGRESS NOTES
Gucci Logan is a 67 year old male with a many year history of an umbilical hernia with the following symptoms of lump.    Finding was not work related.  Onset did not occur with lifting.  Obstructive symptoms:  no  Urinary difficulties:  no  Chronic cough: no  Constipation:  no  Current level of activity:  Low,though trying to increase.    Past medical history, medications, allergies, family history, and social history were reviewed with the patient.  Patient Active Problem List   Diagnosis     Advanced directives, counseling/discussion     CARDIOVASCULAR SCREENING; LDL GOAL LESS THAN 130     Hypertension goal BP (blood pressure) < 140/90     Anxiety     Obesity, unspecified obesity severity, unspecified obesity type     Gastroesophageal reflux disease, esophagitis presence not specified     Hyperlipidemia LDL goal <100     Impaired fasting glucose     Current Outpatient Prescriptions   Medication     azithromycin (ZITHROMAX) 250 MG tablet     omeprazole (PRILOSEC) 20 MG CR capsule     HYDROcodone-acetaminophen (NORCO) 5-325 MG per tablet     tiZANidine (ZANAFLEX) 2 MG tablet     cyclobenzaprine (FLEXERIL) 10 MG tablet     Multiple Vitamins-Minerals (MULTIVITAMIN ADULT PO)     hydrochlorothiazide (HYDRODIURIL) 25 MG tablet     losartan (COZAAR) 100 MG tablet     amLODIPine (NORVASC) 2.5 MG tablet     diazepam (VALIUM) 5 MG tablet     metoprolol (TOPROL-XL) 50 MG 24 hr tablet     LYRICA 50 MG capsule     Cholecalciferol (VITAMIN D) 2000 UNITS CAPS     No current facility-administered medications for this visit.      ROS: 10 point review of systems negative except noted in HPI  PHYSICAL EXAM  General appearance- healthy, alert, and in no distress.  Skin- Skin color and turgor normal.  No obvious rashes.  Neck- Neck is supple with no obvious adenopathy.  Lungs- Respiratory effort unlabored.  Gait- Normal.  Abdomen - obese, soft non distended, non tender without obvious masses, except for wide based umbilical  hernia easily reduced.  Impression: Umbilical hernia low risk for watchful waiting given patient weight.  Rec: Abdominal binder when active, weight loss progress, return to see me when weight closer to 30 BMI.  The total time spent with this patient was 30 minutes.  Of this time, greater than 50% was spent counseling and coordinating care.

## 2018-03-13 ENCOUNTER — TELEPHONE (OUTPATIENT)
Dept: FAMILY MEDICINE | Facility: CLINIC | Age: 68
End: 2018-03-13

## 2018-03-13 DIAGNOSIS — J01.90 ACUTE SINUSITIS WITH SYMPTOMS > 10 DAYS: Primary | ICD-10-CM

## 2018-03-13 NOTE — TELEPHONE ENCOUNTER
Reason for Call:  Other     Detailed comments: Had eye surgery last month and is having issues with watery eyes, wants to hear back for some question he has.     Phone Number Patient can be reached at: Home number on file [phone] (home)    Best Time: Anytime after 830 am    Can we leave a detailed message on this number? YES    Call taken on 3/13/2018 at 12:48 PM by Maricruz Mcclain

## 2018-03-13 NOTE — TELEPHONE ENCOUNTER
Attempt # 1  Called patient at home number.  Was call answered?  No, voice mail box not set up.    Tika Mistry RN  [facility]

## 2018-03-14 RX ORDER — AZITHROMYCIN 250 MG/1
TABLET, FILM COATED ORAL
Qty: 6 TABLET | Refills: 1 | Status: SHIPPED | OUTPATIENT
Start: 2018-03-14 | End: 2018-03-29

## 2018-03-14 NOTE — TELEPHONE ENCOUNTER
Attempt # 2  Called patient at home number.  Was call answered?  Yes, right eye only is running, right nostril is running, eye surgery about a month ago, started Z-pack, used cocoa butter on scars around eyes, has resolved now. Nurse suggested the possibility of cocoa butter in his eye causing irritation, also advised to call eye surgeon, patient verbalized understanding.  Will be picking up medications today and would like to  another Z-yue.  Requests PCP send in another order for Z-yue. With one refill.    Tika Mistry RN  [facility]

## 2018-03-14 NOTE — TELEPHONE ENCOUNTER
Attempt # 1  Called patient at home number.  Was call answered?  Yes, relayed information scripts sent.    Tika Mistry RN  [facility]

## 2018-03-29 ENCOUNTER — OFFICE VISIT (OUTPATIENT)
Dept: FAMILY MEDICINE | Facility: CLINIC | Age: 68
End: 2018-03-29
Payer: COMMERCIAL

## 2018-03-29 VITALS
DIASTOLIC BLOOD PRESSURE: 79 MMHG | WEIGHT: 290 LBS | HEART RATE: 98 BPM | TEMPERATURE: 98 F | OXYGEN SATURATION: 94 % | SYSTOLIC BLOOD PRESSURE: 150 MMHG | BODY MASS INDEX: 40.45 KG/M2

## 2018-03-29 DIAGNOSIS — M25.561 RIGHT KNEE PAIN, UNSPECIFIED CHRONICITY: ICD-10-CM

## 2018-03-29 DIAGNOSIS — I10 HYPERTENSION GOAL BP (BLOOD PRESSURE) < 140/90: Primary | ICD-10-CM

## 2018-03-29 DIAGNOSIS — R73.01 IMPAIRED FASTING GLUCOSE: ICD-10-CM

## 2018-03-29 DIAGNOSIS — E66.9 OBESITY, UNSPECIFIED OBESITY SEVERITY, UNSPECIFIED OBESITY TYPE: ICD-10-CM

## 2018-03-29 DIAGNOSIS — F41.9 ANXIETY: ICD-10-CM

## 2018-03-29 DIAGNOSIS — M25.512 LEFT SHOULDER PAIN, UNSPECIFIED CHRONICITY: ICD-10-CM

## 2018-03-29 DIAGNOSIS — R09.81 CONGESTION OF PARANASAL SINUS: ICD-10-CM

## 2018-03-29 PROCEDURE — 99214 OFFICE O/P EST MOD 30 MIN: CPT | Performed by: FAMILY MEDICINE

## 2018-03-29 RX ORDER — HYDROCHLOROTHIAZIDE 12.5 MG/1
12.5 TABLET ORAL DAILY
Qty: 90 TABLET | Refills: 1 | Status: SHIPPED | OUTPATIENT
Start: 2018-03-29 | End: 2018-08-17

## 2018-03-29 RX ORDER — HYDROCODONE BITARTRATE AND ACETAMINOPHEN 5; 325 MG/1; MG/1
1 TABLET ORAL EVERY 6 HOURS PRN
Qty: 30 TABLET | Refills: 0 | Status: SHIPPED | OUTPATIENT
Start: 2018-03-29 | End: 2018-08-17

## 2018-03-29 NOTE — PROGRESS NOTES
SUBJECTIVE:   Gucci Logan is a 67 year old male who presents to clinic today for the following health issues:       Discuss on cold symptoms that he has taken 2 z packs for  Discuss ankle problems  Chest issues    none    Problem list and histories reviewed & adjusted, as indicated.  Additional history: as documented         Reviewed and updated as needed this visit by clinical staff  Tobacco  Allergies  Meds  Med Hx  Surg Hx  Fam Hx  Soc Hx      Reviewed and updated as needed this visit by Provider          zpack never really took care of symptoms      Sleep apnea    Got injection in ankle at [facility] yesterday    Full physical not done     Mentation and affect are fine    No tremor of speech or extremity    No distinct sinus tenderness or in submandib area     Oral mucosa dry    abd soft, obese, nontender    Minimal edema of lower legs    No chest pain    Breathing okay      ASSESSMENT / PLAN:  (I10) Hypertension goal BP (blood pressure) < 140/90  (primary encounter diagnosis)  Comment: will actually decrease the hctz to 12.5.  Patient will watch for any increase in swelling.  He has been urinating a lot.  Plan: hydrochlorothiazide 12.5 MG TABS tablet             (M25.512) Left shoulder pain, unspecified chronicity  Comment: needs refill   Plan: HYDROcodone-acetaminophen (NORCO) 5-325 MG per         tablet        Uses prn     (M25.561) Right knee pain, unspecified chronicity  Comment: as above   Plan: HYDROcodone-acetaminophen (NORCO) 5-325 MG per         tablet             (F41.9) Anxiety  Comment: lots of stress   Plan: MENTAL HEALTH REFERRAL  - Adult; Outpatient         Treatment; Individual/Couples/Family/Group         Therapy/Health Psychology; [facility]: [facility] [phone]; We will         contact you to schedule the appointment or         please call with any questions        Patient to call and schedule appointment     (Q97.83) Congestion of paranasal sinus  Comment:  hold off on further antibiotics   Plan: as above     (E66.9) Obesity, unspecified obesity severity, unspecified obesity type  Comment: chronic   Plan: keep working on healthy diet/exercise and wt loss  Reviewed note from [facility]     (R73.01) Impaired fasting glucose  Comment: I advised we check hemoglobin a1c today given recent high sugar.  He declined this but was agreeable to rechecking in a couple months.  Plan: work real hard on diet/ exercise/ wt loss then see us back in 2months, sooner if needed.      I reviewed the patient's medications, allergies, medical history, family history, and social history.    Richi Jaramillo MD

## 2018-03-29 NOTE — MR AVS SNAPSHOT
After Visit Summary   3/29/2018    Gucci Logan    MRN: [id number]           Patient Information     Date Of Birth          1950        Visit Information        Provider Department      3/29/2018 9:20 AM Richi Jaramillo MD [facility]        Today's Diagnoses     Hypertension goal BP (blood pressure) < 140/90    -  1    Left shoulder pain, unspecified chronicity        Right knee pain, unspecified chronicity        Anxiety          Care Instructions    Return to clinic in 2 months, sooner if needed     Check labs then ( for diabetes especially )    Hold off antibiotics    If sinus symptoms not resolving, call    Wt loss is key    Healthy diet, exercise    Decrease hydrochlorothiazide to 12.5 mg daily    Low salt diet    Call to schedule with counselor/ therapist           Follow-ups after your visit        Additional Services     MENTAL HEALTH REFERRAL  - Adult; Outpatient Treatment; Individual/Couples/Family/Group Therapy/Health Psychology; G: [facility] [phone]; We will contact you to schedule the appointment or please call with any questions       All scheduling is subject to the client's specific insurance plan & benefits, provider/location availability, and provider clinical specialities.  Please arrive 15 minutes early for your first appointment and bring your completed paperwork.    Please be aware that coverage of these services is subject to the terms and limitations of your health insurance plan.  Call member services at your health plan with any benefit or coverage questions.                            Who to contact     If you have questions or need follow up information about today's clinic visit or your schedule please contact [facility] directly at [phone].  Normal or non-critical lab and imaging results will be communicated to you by MyChart, letter or phone within 4 business days after the clinic  "has received the results. If you do not hear from us within 7 days, please contact the clinic through [facility] [employer] or phone. If you have a critical or abnormal lab result, we will notify you by phone as soon as possible.  Submit refill requests through [employer] or call your pharmacy and they will forward the refill request to us. Please allow 3 business days for your refill to be completed.          Additional Information About Your Visit        [employer] Information     [facility] lets you send messages to your doctor, view your test results, renew your prescriptions, schedule appointments and more. To sign up, go to [email or web address] . Click on \"Log in\" on the left side of the screen, which will take you to the Welcome page. Then click on \"Sign up Now\" on the right side of the page.     You will be asked to enter the access code listed below, as well as some personal information. Please follow the directions to create your username and password.     Your access code is: [email or web address]  Expires: 2018 10:23 AM     Your access code will  in 90 days. If you need help or a new code, please call your [facility] [place] clinic or [phone].        Care EveryWhere ID     This is your Care EveryWhere ID. This could be used by other organizations to access your [facility] [place] medical records  [id number]        Your Vitals Were     Pulse Temperature Pulse Oximetry BMI (Body Mass Index)          98 98  F (36.7  C) (Oral) 94% 40.45 kg/m2         Blood Pressure from Last 3 Encounters:   18 150/79   18 162/79   18 127/77    Weight from Last 3 Encounters:   18 290 lb (131.5 kg)   18 288 lb 9.6 oz (130.9 kg)   18 281 lb 6 oz (127.6 kg)              We Performed the Following     MENTAL HEALTH REFERRAL  - Adult; Outpatient Treatment; Individual/Couples/Family/Group Therapy/Health Psychology; [facility]: [facility] [phone]; We will contact you to schedule the appointment or please call " with any questions          Today's Medication Changes          These changes are accurate as of 3/29/18 10:02 AM.  If you have any questions, ask your nurse or doctor.               These medicines have changed or have updated prescriptions.        Dose/Directions    hydrochlorothiazide 12.5 MG Tabs tablet   This may have changed:  See the new instructions.   Used for:  Hypertension goal BP (blood pressure) < 140/90   Changed by:  Richi Jaramillo MD        Dose:  12.5 mg   Take 1 tablet (12.5 mg) by mouth daily   Quantity:  90 tablet   Refills:  1         Stop taking these medicines if you haven't already. Please contact your care team if you have questions.     azithromycin 250 MG tablet   Commonly known as:  ZITHROMAX   Stopped by:  Richi Jaramillo MD                Where to get your medicines      These medications were sent to [facility] [address] [place] [address] [address]  [address] [facility] [address]     Phone:  [phone]     hydrochlorothiazide 12.5 MG Tabs tablet         Some of these will need a paper prescription and others can be bought over the counter.  Ask your nurse if you have questions.     Bring a paper prescription for each of these medications     HYDROcodone-acetaminophen 5-325 MG per tablet                Primary Care Provider Office Phone # Fax #    Richi Jaramillo [phone]       [address] [place] [address]        Equal Access to Services     ARTHUR FERNANDEZ AH: Hadii gonzalo delgadoo Sosylvester, waaxda luqadaha, qaybta kaalmada cristina, shannan singh. So [facility] [phone].    ATENCIÓN: Si habla español, tiene a khoury disposición servicios gratuitos de asistencia lingüística. Manohar al [phone].    We comply with applicable federal civil rights laws and [place] laws. We do not discriminate on the basis of race, color, national origin, age, disability, sex, sexual orientation, or gender  identity.            Thank you!     Thank you for choosing [facility]  for your care. Our goal is always to provide you with excellent care. Hearing back from our patients is one way we can continue to improve our services. Please take a few minutes to complete the written survey that you may receive in the mail after your visit with us. Thank you!             Your Updated Medication List - Protect others around you: Learn how to safely use, store and throw away your medicines at www.disposemymeds.org.          This list is accurate as of 3/29/18 10:02 AM.  Always use your most recent med list.                   Brand Name Dispense Instructions for use Diagnosis    amLODIPine 2.5 MG tablet    NORVASC    90 tablet    TAKE ONE TABLET BY MOUTH ONCE DAILY    Essential hypertension with goal blood pressure less than 140/90       cyclobenzaprine 10 MG tablet    FLEXERIL    90 tablet    TAKE ONE TABLET BY MOUTH THREE TIMES DAILY AS NEEDED FOR MUSCLE SPASM    Muscle pain       diazepam 5 MG tablet    VALIUM    10 tablet    1/2 to 1 po every 6 hours as needed for anxiety    Anxiety       hydrochlorothiazide 12.5 MG Tabs tablet     90 tablet    Take 1 tablet (12.5 mg) by mouth daily    Hypertension goal BP (blood pressure) < 140/90       HYDROcodone-acetaminophen 5-325 MG per tablet    NORCO    30 tablet    Take 1 tablet by mouth every 6 hours as needed for moderate to severe pain    Left shoulder pain, unspecified chronicity, Right knee pain, unspecified chronicity       losartan 100 MG tablet    COZAAR    90 tablet    TAKE ONE TABLET BY MOUTH ONCE DAILY    Hypertension goal BP (blood pressure) < 140/90       LYRICA 50 MG capsule   Generic drug:  pregabalin     90 capsule    TAKE ONE CAPSULE BY MOUTH THREE TIMES DAILY    Neuropathy       metoprolol succinate 50 MG 24 hr tablet    TOPROL-XL    90 tablet    TAKE ONE TABLET BY MOUTH ONCE DAILY    Hypertension goal BP (blood pressure) < 140/90        MULTIVITAMIN ADULT PO           omeprazole 20 MG CR capsule    priLOSEC    90 capsule    TAKE ONE CAPSULE BY MOUTH ONCE DAILY.  TAKE 30-60 MINUTES BEFORE A MEAL.    Gastroesophageal reflux disease without esophagitis       tiZANidine 2 MG tablet    ZANAFLEX    90 tablet    Take 1 tablet (2 mg) by mouth 3 times daily as needed for muscle spasms    Muscle pain       vitamin D 2000 UNITS Caps     90 capsule    1 po daily    Vitamin D deficiency disease

## 2018-04-20 ENCOUNTER — TELEPHONE (OUTPATIENT)
Dept: FAMILY MEDICINE | Facility: CLINIC | Age: 68
End: 2018-04-20

## 2018-04-20 DIAGNOSIS — J01.90 ACUTE SINUSITIS WITH SYMPTOMS > 10 DAYS: ICD-10-CM

## 2018-04-23 RX ORDER — AZITHROMYCIN 250 MG/1
TABLET, FILM COATED ORAL
Qty: 6 TABLET | Refills: 1 | Status: SHIPPED | OUTPATIENT
Start: 2018-04-23 | End: 2018-08-17

## 2018-04-23 NOTE — TELEPHONE ENCOUNTER
Requested Prescriptions   Pending Prescriptions Disp Refills     azithromycin (ZITHROMAX) 250 MG tablet [Pharmacy Med Name: AZITHROMYCIN 250MG   TAB] 6 tablet 1     Sig: TAKE 2 TABLETS BY MOUTH ON DAY 1, AND THEN TAKE 1 TABLET BY MOUTH ONCE A DAY ON DAY 2 THROUGH DAY 5    There is no refill protocol information for this order         Last Written Prescription Date:  na  Last Fill Quantity: na,   # refills: na  Last Office Visit: 3/29/18  Future Office visit:       Routing refill request to provider for review/approval because:  Drug not active on patient's medication list

## 2018-04-23 NOTE — TELEPHONE ENCOUNTER
I okayed the azithromycin that was pended.      If symptoms not improving, return to clinic.    Please inform patient.    Richi Jaramillo MD

## 2018-04-23 NOTE — TELEPHONE ENCOUNTER
"  I called patient who reports his sinus symptoms came back this weekend and had night sweats, sore throat.   Says he is feeling better today but says Dr. Jaramillo usually leaves a sort of standing prescription for an antibiotic to use if patient feels he is not going to get any better.    He says he did discuss this with Dr. Jaramillo at last visit, they had discussed possibly trying something else in the future (?amoxicillin?).   Patient thinks he probably does not need anything right now but would like to have the \"reserve Rx\" sent so he does not have to \"bug Dr. Jaramillo\" next time he needs it.    Connie Garza RN  [facility]      "

## 2018-04-23 NOTE — TELEPHONE ENCOUNTER
"Patient was last seen 3/29/18.    See note that visit:    R09.81) Congestion of paranasal sinus  Comment: hold off on further antibiotics   Plan: as above     And patient instructions:    Patient Instructions    Return to clinic in 2 months, sooner if needed      Check labs then ( for diabetes especially )     Hold off antibiotics     If sinus symptoms not resolving, call           I called patient who reports his sinus symptoms came back this weekend and had night sweats, sore throat.   Says he is feeling better today but says Dr. Jaramillo usually leaves a sort of standing prescription for an antibiotic to use if patient feels he is not going to get any better.    He says he did discuss this with Dr. Jaramillo at last visit, they had discussed possibly trying something else in the future (?amoxicillin?).   Patient thinks he probably does not need anything right now but would like to have the \"reserve Rx\" sent so he does not have to \"bug Dr. Jaramillo\" next time he needs it.    Routing refill request to provider for review/approval because:  Drug not on the [facility] [place]     Connie Garza RN  [facility]              "

## 2018-08-08 ENCOUNTER — TELEPHONE (OUTPATIENT)
Dept: FAMILY MEDICINE | Facility: CLINIC | Age: 68
End: 2018-08-08

## 2018-08-08 DIAGNOSIS — R53.83 FATIGUE, UNSPECIFIED TYPE: ICD-10-CM

## 2018-08-08 DIAGNOSIS — R73.01 IMPAIRED FASTING GLUCOSE: Primary | ICD-10-CM

## 2018-08-08 DIAGNOSIS — M79.10 MUSCLE PAIN: ICD-10-CM

## 2018-08-08 DIAGNOSIS — Z12.5 SCREENING FOR PROSTATE CANCER: ICD-10-CM

## 2018-08-08 NOTE — TELEPHONE ENCOUNTER
Reason for Call:  Lab Orders     Detailed comments: Patient is wanting lab orders for Thyroid  T4 and T3, CMP,Liver Functions, CBC, Potassium and Magnesium. Having a lot of cramping in the legs, arms and stomach. Pat[place] would like a call back if this is possible.      Phone Number Patient can be reached at: Home number on file [phone] (home) or Cell number on file:    Telephone Information:   Mobile [phone]       Best Time: Anytime    Can we leave a detailed message on this number? YES    Call taken on 8/8/2018 at 2:33 PM by Maricruz Mcclain

## 2018-08-08 NOTE — TELEPHONE ENCOUNTER
This can wait for Dr. Jaramillo to review.  Patient would like to get some labs drawn prior to seeing you.  Has some increased muscle cramping and still has not been able to loose weight event though he is very active and eating well balance meals.    He is hoping to do the follow labs before appt:  TSH, T4, T3, CMP,Liver Functions, CBC, Potassium and Magnesium    Please review and advise.  Thank you.  Rufina Silver RN

## 2018-08-10 DIAGNOSIS — R53.83 FATIGUE, UNSPECIFIED TYPE: ICD-10-CM

## 2018-08-10 DIAGNOSIS — M79.10 MUSCLE PAIN: ICD-10-CM

## 2018-08-10 DIAGNOSIS — R73.01 IMPAIRED FASTING GLUCOSE: ICD-10-CM

## 2018-08-10 DIAGNOSIS — Z12.5 SCREENING FOR PROSTATE CANCER: ICD-10-CM

## 2018-08-10 LAB
ALBUMIN SERPL-MCNC: 3.3 G/DL (ref 3.4–5)
ALP SERPL-CCNC: 70 U/L (ref 40–150)
ALT SERPL W P-5'-P-CCNC: 74 U/L (ref 0–70)
ANION GAP SERPL CALCULATED.3IONS-SCNC: 10 MMOL/L (ref 3–14)
AST SERPL W P-5'-P-CCNC: 45 U/L (ref 0–45)
BASOPHILS # BLD AUTO: 0 10E9/L (ref 0–0.2)
BASOPHILS NFR BLD AUTO: 0 %
BILIRUB SERPL-MCNC: 1.1 MG/DL (ref 0.2–1.3)
BUN SERPL-MCNC: 16 MG/DL (ref 7–30)
CALCIUM SERPL-MCNC: 8.2 MG/DL (ref 8.5–10.1)
CHLORIDE SERPL-SCNC: 108 MMOL/L (ref 94–109)
CK SERPL-CCNC: 273 U/L (ref 30–300)
CO2 SERPL-SCNC: 24 MMOL/L (ref 20–32)
CREAT SERPL-MCNC: 0.96 MG/DL (ref 0.66–1.25)
DIFFERENTIAL METHOD BLD: ABNORMAL
EOSINOPHIL # BLD AUTO: 0.1 10E9/L (ref 0–0.7)
ERYTHROCYTE [DISTWIDTH] IN BLOOD BY AUTOMATED COUNT: 13.2 % (ref 10–15)
GFR SERPL CREATININE-BSD FRML MDRD: 78 ML/MIN/1.7M2
GLUCOSE SERPL-MCNC: 178 MG/DL (ref 70–99)
HBA1C MFR BLD: 7.1 % (ref 0–5.6)
HCT VFR BLD AUTO: 44.8 % (ref 40–53)
HGB BLD-MCNC: 15.1 G/DL (ref 13.3–17.7)
LYMPHOCYTES # BLD AUTO: 0.8 10E9/L (ref 0.8–5.3)
MAGNESIUM SERPL-MCNC: 2.2 MG/DL (ref 1.6–2.3)
MCH RBC QN AUTO: 33.6 PG (ref 26.5–33)
MCHC RBC AUTO-ENTMCNC: 33.7 G/DL (ref 31.5–36.5)
MCV RBC AUTO: 100 FL (ref 78–100)
MONOCYTES # BLD AUTO: 0.6 10E9/L (ref 0–1.3)
NEUTROPHILS # BLD AUTO: 4.4 10E9/L (ref 1.6–8.3)
PLATELET # BLD AUTO: 82 10E9/L (ref 150–450)
PLATELET # BLD EST: ABNORMAL 10*3/UL
POTASSIUM SERPL-SCNC: 4.2 MMOL/L (ref 3.4–5.3)
PROT SERPL-MCNC: 7 G/DL (ref 6.8–8.8)
PSA SERPL-ACNC: 3.54 UG/L (ref 0–4)
RBC # BLD AUTO: 4.5 10E12/L (ref 4.4–5.9)
RBC MORPH BLD: NORMAL
SODIUM SERPL-SCNC: 142 MMOL/L (ref 133–144)
T3 SERPL-MCNC: 104 NG/DL (ref 60–181)
T3FREE SERPL-MCNC: 2.5 PG/ML (ref 2.3–4.2)
T4 FREE SERPL-MCNC: 1.06 NG/DL (ref 0.76–1.46)
TSH SERPL DL<=0.005 MIU/L-ACNC: 4.77 MU/L (ref 0.4–4)
WBC # BLD AUTO: 5.9 10E9/L (ref 4–11)

## 2018-08-10 PROCEDURE — 84480 ASSAY TRIIODOTHYRONINE (T3): CPT | Mod: 59 | Performed by: FAMILY MEDICINE

## 2018-08-10 PROCEDURE — G0103 PSA SCREENING: HCPCS | Performed by: FAMILY MEDICINE

## 2018-08-10 PROCEDURE — 84481 FREE ASSAY (FT-3): CPT | Performed by: FAMILY MEDICINE

## 2018-08-10 PROCEDURE — 83036 HEMOGLOBIN GLYCOSYLATED A1C: CPT | Performed by: FAMILY MEDICINE

## 2018-08-10 PROCEDURE — 84439 ASSAY OF FREE THYROXINE: CPT | Performed by: FAMILY MEDICINE

## 2018-08-10 PROCEDURE — 80053 COMPREHEN METABOLIC PANEL: CPT | Performed by: FAMILY MEDICINE

## 2018-08-10 PROCEDURE — 36415 COLL VENOUS BLD VENIPUNCTURE: CPT | Performed by: FAMILY MEDICINE

## 2018-08-10 PROCEDURE — 83735 ASSAY OF MAGNESIUM: CPT | Performed by: FAMILY MEDICINE

## 2018-08-10 PROCEDURE — 82550 ASSAY OF CK (CPK): CPT | Performed by: FAMILY MEDICINE

## 2018-08-10 PROCEDURE — 84443 ASSAY THYROID STIM HORMONE: CPT | Performed by: FAMILY MEDICINE

## 2018-08-10 PROCEDURE — 85025 COMPLETE CBC W/AUTO DIFF WBC: CPT | Performed by: FAMILY MEDICINE

## 2018-08-10 NOTE — TELEPHONE ENCOUNTER
Attempt # 1  Called patient at home number.  Was call answered?  Yes, scheduled lab appointment for today and patient states will make appointment to see PCP when comes to clinic for lab.      Tika Mistry RN  [facility]

## 2018-08-17 ENCOUNTER — OFFICE VISIT (OUTPATIENT)
Dept: FAMILY MEDICINE | Facility: CLINIC | Age: 68
End: 2018-08-17
Payer: COMMERCIAL

## 2018-08-17 VITALS
TEMPERATURE: 97.2 F | WEIGHT: 270 LBS | HEART RATE: 62 BPM | DIASTOLIC BLOOD PRESSURE: 67 MMHG | BODY MASS INDEX: 37.66 KG/M2 | SYSTOLIC BLOOD PRESSURE: 138 MMHG | OXYGEN SATURATION: 95 %

## 2018-08-17 DIAGNOSIS — M79.10 MUSCLE PAIN: ICD-10-CM

## 2018-08-17 DIAGNOSIS — E66.9 OBESITY, UNSPECIFIED OBESITY SEVERITY, UNSPECIFIED OBESITY TYPE: ICD-10-CM

## 2018-08-17 DIAGNOSIS — M25.512 LEFT SHOULDER PAIN, UNSPECIFIED CHRONICITY: ICD-10-CM

## 2018-08-17 DIAGNOSIS — E78.5 HYPERLIPIDEMIA LDL GOAL <100: ICD-10-CM

## 2018-08-17 DIAGNOSIS — I10 HYPERTENSION GOAL BP (BLOOD PRESSURE) < 140/90: Primary | ICD-10-CM

## 2018-08-17 DIAGNOSIS — R73.01 IMPAIRED FASTING GLUCOSE: ICD-10-CM

## 2018-08-17 DIAGNOSIS — M25.561 RIGHT KNEE PAIN, UNSPECIFIED CHRONICITY: ICD-10-CM

## 2018-08-17 DIAGNOSIS — G47.62 NOCTURNAL LEG CRAMPS: ICD-10-CM

## 2018-08-17 DIAGNOSIS — I83.93 VARICOSE VEINS OF BOTH LOWER EXTREMITIES: ICD-10-CM

## 2018-08-17 PROCEDURE — 99214 OFFICE O/P EST MOD 30 MIN: CPT | Performed by: FAMILY MEDICINE

## 2018-08-17 RX ORDER — HYDROCHLOROTHIAZIDE 25 MG/1
25 TABLET ORAL DAILY
Qty: 90 TABLET | Refills: 1 | Status: SHIPPED | OUTPATIENT
Start: 2018-08-17 | End: 2019-01-10

## 2018-08-17 RX ORDER — METOPROLOL SUCCINATE 50 MG/1
50 TABLET, EXTENDED RELEASE ORAL DAILY
Qty: 90 TABLET | Refills: 1 | Status: SHIPPED | OUTPATIENT
Start: 2018-08-17 | End: 2019-02-05

## 2018-08-17 RX ORDER — HYDROCODONE BITARTRATE AND ACETAMINOPHEN 5; 325 MG/1; MG/1
1 TABLET ORAL EVERY 6 HOURS PRN
Qty: 30 TABLET | Refills: 0 | Status: SHIPPED | OUTPATIENT
Start: 2018-08-17 | End: 2018-10-01

## 2018-08-17 RX ORDER — LOSARTAN POTASSIUM 100 MG/1
100 TABLET ORAL DAILY
Qty: 90 TABLET | Refills: 1 | Status: SHIPPED | OUTPATIENT
Start: 2018-08-17 | End: 2019-02-05

## 2018-08-17 RX ORDER — TIZANIDINE 2 MG/1
2 TABLET ORAL 3 TIMES DAILY PRN
Qty: 90 TABLET | Refills: 1 | Status: SHIPPED | OUTPATIENT
Start: 2018-08-17 | End: 2018-10-25

## 2018-08-17 RX ORDER — AMLODIPINE BESYLATE 2.5 MG/1
2.5 TABLET ORAL DAILY
Qty: 90 TABLET | Refills: 1 | Status: SHIPPED | OUTPATIENT
Start: 2018-08-17 | End: 2019-02-05

## 2018-08-17 ASSESSMENT — PAIN SCALES - GENERAL: PAINLEVEL: NO PAIN (0)

## 2018-08-17 NOTE — PROGRESS NOTES
SUBJECTIVE:   Gucci Logan is a 68 year old male who presents to clinic today for the following health issues:       Follow up on labs and tria visits    none    Problem list and histories reviewed & adjusted, as indicated.  Additional history: as documented         Reviewed and updated as needed this visit by clinical staff  Allergies  Meds  Problems       Reviewed and updated as needed this visit by Provider          patient has fitbit with printout    Patient feels like he has not been more active than usual because of having the fit bit but he is keeping track of what he is doing    Tripped and fell, twisted ankle    Had to wear boot, sprain    Swelling around ankle right     Ankle better but sometimes feels like it might give out    Back bothering    Bad leg cramps    Just occasional lyrica        Physical Exam   Constitutional: He is oriented to person, place, and time and well-developed, well-nourished, and in no distress. No distress.   HENT:   Head: Normocephalic and atraumatic.   Eyes: Conjunctivae are normal.   Neck: Carotid bruit is not present.   Cardiovascular: Normal rate, regular rhythm, normal heart sounds and intact distal pulses.  Exam reveals no gallop and no friction rub.    No murmur heard.  Pulmonary/Chest: Effort normal and breath sounds normal. No respiratory distress. He has no wheezes. He has no rales.   Musculoskeletal: He exhibits edema (mild swelling of the lower extremities, 1+ pretibial).   Neurological: He is alert and oriented to person, place, and time.   Skin: He is not diaphoretic.   Psychiatric: Mood and affect normal.     Lots of small varicose veins on the feet/ ankles        Went over lab results in detail    ASSESSMENT / PLAN:  (I10) Hypertension goal BP (blood pressure) < 140/90  (primary encounter diagnosis)  Comment: with the swelling in ankle areas, prudent to go back to 25 mg of the hydrochlorothiazide.  Other meds as is.   Plan: hydrochlorothiazide  (HYDRODIURIL) 25 MG tablet,        losartan (COZAAR) 100 MG tablet, amLODIPine         (NORVASC) 2.5 MG tablet, metoprolol succinate         (TOPROL-XL) 50 MG 24 hr tablet             (E66.9) Obesity, unspecified obesity severity, unspecified obesity type  Comment: chronic.  Patient frustrated with wt but it is coming down slowly   Plan: keep working on healthy diet/exercise and wt loss     (M25.512) Left shoulder pain, unspecified chronicity  Comment: just uses occasionally   Plan: HYDROcodone-acetaminophen (NORCO) 5-325 MG per         tablet        Refill     (M25.561) Right knee pain, unspecified chronicity  Comment: as above   Plan: HYDROcodone-acetaminophen (NORCO) 5-325 MG per         tablet             (M79.1) Muscle pain  Comment: uses occasionally prn   Plan: tiZANidine (ZANAFLEX) 2 MG tablet        Refill     (E78.5) Hyperlipidemia LDL goal <100  Comment: LDL only slightly high in past  Plan: patient can call/ email if he wants this rechecked fasting     (R73.01) Impaired fasting glucose  Comment: hemoglobin a1c in low 7s, stable. Not on meds.   Plan: keep working on healthy diet/exercise and wt loss    (G47.62) Nocturnal leg cramps  Comment: could try tonic water   Plan: as above     (I83.93) Varicose veins of both lower extremities  Comment: many very small veins in ankles/ feet area  Plan: monitor      I reviewed the patient's medications, allergies, medical history, family history, and social history.    Richi Jaramillo MD

## 2018-08-17 NOTE — PATIENT INSTRUCTIONS
Increase hydrochlorothiazide to 25 mg daily    Continue other meds as is    Hold off on diabetes meds    Keep working on healthy diet/exercise and wt loss    Low  Salt diet    Increase lean protein intake if possible

## 2018-09-10 ENCOUNTER — TELEPHONE (OUTPATIENT)
Dept: FAMILY MEDICINE | Facility: CLINIC | Age: 68
End: 2018-09-10

## 2018-09-10 DIAGNOSIS — J01.90 ACUTE SINUSITIS WITH SYMPTOMS > 10 DAYS: Primary | ICD-10-CM

## 2018-09-10 RX ORDER — AZITHROMYCIN 250 MG/1
TABLET, FILM COATED ORAL
Qty: 6 TABLET | Refills: 1 | Status: SHIPPED | OUTPATIENT
Start: 2018-09-10 | End: 2018-09-25

## 2018-09-10 NOTE — TELEPHONE ENCOUNTER
Called and advised patient of the message below per PCP.    Patient also wanted PCP to know that he saw Dr. Shepherd at [facility] and they said he has early beginnings of skin cancer on his nose.  They did biopsy it today.    Patient also stated that when he was waiting for the provider at [facility], he read a brochure on Pushpa Shape 3.  It stated that it is like a non-invasive US that it used to break up and bust fat cells.  They then turn into triglycerides and are excreted through the liver.  He wanted to know what Dr. Jaramillo thought of this.      Routed to PCP.  Jenelle Mcqueen RN [facility] Triage.

## 2018-09-10 NOTE — TELEPHONE ENCOUNTER
Routing to PCP to review and advise.    Also see TE from 4/20/2018    Tika Mistry RN  [facility]

## 2018-09-10 NOTE — TELEPHONE ENCOUNTER
Reason for Call:  Medication or medication refill:   Z-pack    Do you use a [facility]?  Name of the pharmacy and phone number for the current request:  [facility] in [place]    Name of the medication requested:   Z-paci    Other request: Patient states he has started a Z-pack but he needs you to refill 2 more for him.  He takes them back-to-back and then needs one in reserve.  If any questions, please give him a call.    Can we leave a detailed message on this number? YES    Phone number patient can be reached at: Home number on file [phone] (home)    Best Time: anytime      Call taken on 9/10/2018 at 9:53 AM by Nadine Velasco

## 2018-09-11 NOTE — TELEPHONE ENCOUNTER
Attempt # 1  Called patient at home number.  Was call answered?  Yes, relayed below message to patient - Patient verbalized understanding and agreement with plan and had no questions.    Tika Mistry RN  [facility]

## 2018-09-11 NOTE — TELEPHONE ENCOUNTER
This sounds safe, but I would wonder how long lasting it is.  Also likely expensive, and out of pocket.    Please inform patient.    Richi Jaramillo MD

## 2018-09-13 ENCOUNTER — TRANSFERRED RECORDS (OUTPATIENT)
Dept: HEALTH INFORMATION MANAGEMENT | Facility: CLINIC | Age: 68
End: 2018-09-13

## 2018-09-15 ENCOUNTER — HEALTH MAINTENANCE LETTER (OUTPATIENT)
Age: 68
End: 2018-09-15

## 2018-09-24 ENCOUNTER — TELEPHONE (OUTPATIENT)
Dept: FAMILY MEDICINE | Facility: CLINIC | Age: 68
End: 2018-09-24

## 2018-09-24 NOTE — TELEPHONE ENCOUNTER
Reason for Call:  Questions/ Symptoms    Detailed comments: Finished Z pack 5 days ago and still is not feeling well. 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 Thinks his heart might have jumped while in court.     Phone Number Patient can be reached at: Cell number on file:    No relevant phone numbers on file.       Best Time: After 11 am    Can we leave a detailed message on this number? YES    Call taken on 9/24/2018 at 8:29 AM by Maricruz Mcclain

## 2018-09-24 NOTE — TELEPHONE ENCOUNTER
Attempt # 1  Called patient at home [phone]  Was call answered?  No answer, voice mail box not set up    Tika Mistry RN  [facility]

## 2018-09-24 NOTE — TELEPHONE ENCOUNTER
Attempt # 2  Called patient at home number.[phone]  Was call answered? Yes, usually does not take medication for at least two weeks, so has been sick about 4 weeks now, has had 2 Z-yue doses. Has been very stressful due to court, states is more irritable then before, thinks he jumped timing again, thinks is similar to last time. Feels like his BP is high? Has a cold, tear duct not working properly after surgery, may need to have another surgery.    Routing to PCP to review and advise.    Scheduled October 1 - only appointment open for 40 minutes!  Patient would like a call from the provider.  Will wait for you call.      Patient states has not been taking BP regularly.         Tika Mistry RN  [facility]

## 2018-09-25 NOTE — TELEPHONE ENCOUNTER
I called and discussed in detail with patient  Advised he use loratadine daily  Could use benadryl at night  See us next week as scheduled  Richi Jaramillo MD

## 2018-10-01 ENCOUNTER — OFFICE VISIT (OUTPATIENT)
Dept: FAMILY MEDICINE | Facility: CLINIC | Age: 68
End: 2018-10-01
Payer: COMMERCIAL

## 2018-10-01 VITALS
HEART RATE: 75 BPM | DIASTOLIC BLOOD PRESSURE: 73 MMHG | SYSTOLIC BLOOD PRESSURE: 135 MMHG | TEMPERATURE: 97.3 F | BODY MASS INDEX: 37.55 KG/M2 | WEIGHT: 269.25 LBS | OXYGEN SATURATION: 96 %

## 2018-10-01 DIAGNOSIS — M25.561 RIGHT KNEE PAIN, UNSPECIFIED CHRONICITY: ICD-10-CM

## 2018-10-01 DIAGNOSIS — Z01.818 PREOP GENERAL PHYSICAL EXAM: Primary | ICD-10-CM

## 2018-10-01 DIAGNOSIS — H02.403 DROOPY EYELID, BILATERAL: ICD-10-CM

## 2018-10-01 DIAGNOSIS — Q10.5 CONGENITAL BLOCKED TEAR DUCT OF LEFT EYE: ICD-10-CM

## 2018-10-01 DIAGNOSIS — M25.512 LEFT SHOULDER PAIN, UNSPECIFIED CHRONICITY: ICD-10-CM

## 2018-10-01 LAB
BASOPHILS # BLD AUTO: 0 10E9/L (ref 0–0.2)
BASOPHILS NFR BLD AUTO: 0.3 %
CREAT SERPL-MCNC: 1.01 MG/DL (ref 0.66–1.25)
DIFFERENTIAL METHOD BLD: ABNORMAL
EOSINOPHIL # BLD AUTO: 0.2 10E9/L (ref 0–0.7)
EOSINOPHIL NFR BLD AUTO: 2.8 %
ERYTHROCYTE [DISTWIDTH] IN BLOOD BY AUTOMATED COUNT: 13.6 % (ref 10–15)
GFR SERPL CREATININE-BSD FRML MDRD: 74 ML/MIN/1.7M2
HCT VFR BLD AUTO: 47 % (ref 40–53)
HGB BLD-MCNC: 15.6 G/DL
LYMPHOCYTES # BLD AUTO: 1 10E9/L (ref 0.8–5.3)
LYMPHOCYTES NFR BLD AUTO: 16.6 %
MACROCYTES BLD QL SMEAR: PRESENT
MCH RBC QN AUTO: 33.2 PG (ref 26.5–33)
MCHC RBC AUTO-ENTMCNC: 33.2 G/DL (ref 31.5–36.5)
MCV RBC AUTO: 100 FL (ref 78–100)
MONOCYTES # BLD AUTO: 0.6 10E9/L (ref 0–1.3)
MONOCYTES NFR BLD AUTO: 9.7 %
NEUTROPHILS # BLD AUTO: 4.4 10E9/L (ref 1.6–8.3)
NEUTROPHILS NFR BLD AUTO: 70.6 %
PLATELET # BLD AUTO: 99 10E9/L (ref 150–450)
PLATELET # BLD EST: ABNORMAL 10*3/UL
POTASSIUM SERPL-SCNC: 4.1 MMOL/L (ref 3.4–5.3)
RBC # BLD AUTO: 4.7 10E12/L (ref 4.4–5.9)
WBC # BLD AUTO: 6.2 10E9/L (ref 4–11)

## 2018-10-01 PROCEDURE — 82565 ASSAY OF CREATININE: CPT | Performed by: FAMILY MEDICINE

## 2018-10-01 PROCEDURE — 85025 COMPLETE CBC W/AUTO DIFF WBC: CPT | Performed by: FAMILY MEDICINE

## 2018-10-01 PROCEDURE — 99215 OFFICE O/P EST HI 40 MIN: CPT | Performed by: FAMILY MEDICINE

## 2018-10-01 PROCEDURE — 36415 COLL VENOUS BLD VENIPUNCTURE: CPT | Performed by: FAMILY MEDICINE

## 2018-10-01 PROCEDURE — 84132 ASSAY OF SERUM POTASSIUM: CPT | Performed by: FAMILY MEDICINE

## 2018-10-01 RX ORDER — HYDROCODONE BITARTRATE AND ACETAMINOPHEN 5; 325 MG/1; MG/1
1 TABLET ORAL EVERY 6 HOURS PRN
Qty: 30 TABLET | Refills: 0 | Status: ON HOLD | OUTPATIENT
Start: 2018-10-01 | End: 2018-10-16

## 2018-10-01 ASSESSMENT — PAIN SCALES - GENERAL: PAINLEVEL: NO PAIN (0)

## 2018-10-01 NOTE — PROGRESS NOTES
[address] [address] [phone]  Dept: [phone]    PRE-OP EVALUATION:  Today's date: 10/1/2018    Gucci Logan (: 1950) presents for pre-operative evaluation assessment as requested by Dr. Krause.  He requires evaluation and anesthesia risk assessment prior to undergoing surgery/procedure for treatment of eye .    Proposed Surgery/ Procedure: left eye lid blepharoplasty  Date of Surgery/ Procedure: 10/16/2018  Time of Surgery/ Procedure:   Hospital/Surgical Facility: [facility]  Fax number for surgical facility: [phone]  Primary Physician: Richi Jaramillo  Type of Anesthesia Anticipated: Local    Patient has a Health Care Directive or Living Will:  NO    1. NO - Do you have a history of heart attack, stroke, stent, bypass or surgery on an artery in the head, neck, heart or legs?  2. NO - Do you ever have any pain or discomfort in your chest?  3. NO - Do you have a history of  Heart Failure?  4. NO - Are you troubled by shortness of breath when: walking on the level, up a slight hill or at night?  5. NO - Do you currently have a cold, bronchitis or other respiratory infection?  6. NO - Do you have a cough, shortness of breath or wheezing?  7. NO - Do you sometimes get pains in the calves of your legs when you walk?  8. NO - Do you or anyone in your family have previous history of blood clots?  9. NO - Do you or does anyone in your family have a serious bleeding problem such as prolonged bleeding following surgeries or cuts?  10. NO - Have you ever had problems with anemia or been told to take iron pills?  11. NO - Have you had any abnormal blood loss such as black, tarry or bloody stools, or abnormal vaginal bleeding?  12. NO - Have you ever had a blood transfusion?  13. NO - Have you or any of your relatives ever had problems with anesthesia?  14. YES - DO YOU HAVE SLEEP APNEA, EXCESSIVE SNORING OR DAYTIME  DROWSINESS? Sleep apnea does not use a CPAP machine  15. NO - Do you have any prosthetic heart valves?  16. NO - Do you have prosthetic joints?  17. NO - Is there any chance that you may be pregnant?      HPI:     HPI related to upcoming procedure: 68 year old male with drooping eyelids and blocked tear duct   To have procedure later this month, per specialist at [facility]           MEDICAL HISTORY:     Patient Active Problem List    Diagnosis Date Noted     Hyperlipidemia LDL goal <100 04/19/2017     Priority: Medium     Impaired fasting glucose 04/19/2017     Priority: Medium     Gastroesophageal reflux disease, esophagitis presence not specified 10/06/2016     Priority: Medium     Obesity, unspecified obesity severity, unspecified obesity type 12/17/2015     Priority: Medium     Anxiety 11/27/2014     Priority: Medium     Hypertension goal BP (blood pressure) < 140/90 02/08/2013     Priority: Medium     Advanced directives, counseling/discussion 01/16/2013     Priority: Medium     Advance Care Planning:   ACP Review and Resources Provided:  Reviewed chart for advance care plan.  Gucci Logan has no plan or code status on file however states presence of ACP document. Copy requested. Confirmed code status reflects current choices pending receipt of document/advance care plan review. Confirmed/documented designated decision maker(s). See permanent comments section of demographics in clinical tab.   Added by Elyssa Olguin on 7/22/2014  Patient states has Advance Directive and will bring in a copy to clinic. 1/16/2013              CARDIOVASCULAR SCREENING; LDL GOAL LESS THAN 130 01/16/2013     Priority: Medium      Past Medical History:   Diagnosis Date     Arthritis      Esophageal reflux 3/1/2014     Hearing problem      Hiatal hernia      Hypertension      Liver disease      Obesity 1/24/2013     Obstructive sleep apnea      Sleep apnea     Advised CPAP machine.  Not keen to use it.      Past Surgical History:   Procedure Laterality Date     ARTHROSCOPY KNEE RT/LT      (L) with partial medial meniscectomy     C OPEN RX ANKLE DISLOCATN+FIXATN      (R)     C SPINAL FUSION,ANT,EA ADNL LEVEL      T12 - L1     CATARACT IOL, RT/LT  Nov and Dec 2017     ENDOSCOPIC ENDONASAL SURGERY  1994     ENDOSCOPY  2-19-15     EYE SURGERY       Hernia surgery Left 1994     NASAL/SINUS POLYPECTOMY       ROTATOR CUFF REPAIR RT/LT Right      Current Outpatient Prescriptions   Medication Sig Dispense Refill     amLODIPine (NORVASC) 2.5 MG tablet Take 1 tablet (2.5 mg) by mouth daily 90 tablet 1     Cholecalciferol (VITAMIN D) 2000 UNITS CAPS 1 po daily 90 capsule 3     diazepam (VALIUM) 5 MG tablet 1/2 to 1 po every 6 hours as needed for anxiety 10 tablet 0     hydrochlorothiazide (HYDRODIURIL) 25 MG tablet Take 1 tablet (25 mg) by mouth daily 90 tablet 1     HYDROcodone-acetaminophen (NORCO) 5-325 MG per tablet Take 1 tablet by mouth every 6 hours as needed for moderate to severe pain 30 tablet 0     losartan (COZAAR) 100 MG tablet Take 1 tablet (100 mg) by mouth daily 90 tablet 1     LYRICA 50 MG capsule TAKE ONE CAPSULE BY MOUTH THREE TIMES DAILY 90 capsule 5     metoprolol succinate (TOPROL-XL) 50 MG 24 hr tablet Take 1 tablet (50 mg) by mouth daily 90 tablet 1     Multiple Vitamins-Minerals (MULTIVITAMIN ADULT PO)        omeprazole (PRILOSEC) 20 MG CR capsule TAKE ONE CAPSULE BY MOUTH ONCE DAILY.  TAKE 30-60 MINUTES BEFORE A MEAL. 90 capsule 3     tiZANidine (ZANAFLEX) 2 MG tablet Take 1 tablet (2 mg) by mouth 3 times daily as needed for muscle spasms 90 tablet 1     OTC products: allergy meds as needed    Allergies   Allergen Reactions     Influenza Vac Typ Other (See Comments)     Delirium, fever      Latex Allergy: NO    Social History   Substance Use Topics     Smoking status: Former Smoker     Years: 5.00     Types: Cigarettes     Start date: 1/1/1990     Quit date: 1/1/1999     Smokeless  tobacco: Never Used     Alcohol use No      Comment: quit in 1999     History   Drug Use No       REVIEW OF SYSTEMS:   Constitutional, neuro, ENT, endocrine, pulmonary, cardiac, gastrointestinal, genitourinary, musculoskeletal, integument and psychiatric systems are negative, except as otherwise noted.    Patient recent antibiotics for sinus.  Doing somewhat better now.    Also took claritin/ benadryl recently for allergies    Some flaring of pain in back/ ankle/ knee    EXAM:   /73 (BP Location: Left arm, Patient Position: Chair, Cuff Size: Adult Regular)  Pulse 75  Temp 97.3  F (36.3  C) (Oral)  Wt 269 lb 4 oz (122.1 kg)  SpO2 96%  BMI 37.55 kg/m2    GENERAL APPEARANCE: healthy, alert and no distress     HENT: ear canals and TM's normal and nose and mouth without ulcers or lesions     NECK: no adenopathy, no asymmetry, masses, or scars and thyroid normal to palpation     RESP: lungs clear to auscultation - no rales, rhonchi or wheezes     CV: regular rates and rhythm, normal S1 S2, no S3 or S4 and no murmur, click or rub     ABDOMEN:  soft, nontender, no HSM or masses and bowel sounds normal     MS: extremities normal- no gross deformities noted, no evidence of inflammation in joints, FROM in all extremities.     SKIN: no suspicious lesions or rashes     NEURO: Normal strength and tone, sensory exam grossly normal, mentation intact and speech normal     PSYCH: mentation appears normal. and affect normal/bright     LYMPHATICS: No cervical adenopathy    DIAGNOSTICS:   No ekg needed for eye exam    We did attach copy of the echo done last year    Labs done, pending    Of note, less than two month ago hemoglobin a1c was 7.1 ( patient not on any diabetes meds )    Recent Labs   Lab Test  08/10/18   0924  02/23/18   0929  11/27/17   1730   08/18/17   0841   06/24/15   0953   HGB  15.1  15.8  15.8   < >  14.9   < >  14.6   PLT  82*  99*  124*   < >  81*   < >  77*   INR   --    --   1.05   --    --    --    1.05   NA  142  138  140   --   140   < >  143   POTASSIUM  4.2  3.7  4.0   < >  3.7   < >  4.0   CR  0.96  0.91  1.07   < >  1.03   < >  0.97   A1C  7.1*   --    --    --   7.3*   < >  5.9    < > = values in this interval not displayed.        IMPRESSION:   Reason for surgery/procedure: eyelid and tear duct problems  Diagnosis/reason for consult: pre-op clearance     The proposed surgical procedure is considered LOW risk.    REVISED CARDIAC RISK INDEX  The patient has the following serious cardiovascular risks for perioperative complications such as (MI, PE, VFib and 3  AV Block):  No serious cardiac risks  INTERPRETATION: 0 risks: Class I (very low risk - 0.4% complication rate)    The patient has the following additional risks for perioperative complications:  No identified additional risks      ICD-10-CM    1. Preop general physical exam Z01.818        RECOMMENDATIONS:          --Patient is to take all scheduled medications on the day of surgery EXCEPT for modifications listed below.    Patient will avoid aspirin and ibuprofen type meds for one week prior    He will take other meds early the am of procedure    Of note, hemoglobin a1c was in mild diabetes range recently but he is not on any diabetes meds. Just working on diet/ exercise/ wt loss.    No history of anesthesia problems    Patient has past history of liver problems.  Went through treatment for hepatitis C.    He has had low platelets for year, but stable.  No bleeding or clotting problems.    APPROVAL GIVEN to proceed with proposed procedure, without further diagnostic evaluation, providing labs are okay       Signed Electronically by: Richi Jaramillo MD    Copy of this evaluation report is provided to requesting physician.    Addendum: patient also needed refill of the hydrocodone.  Uses sparingly.  Richi Jaramillo MD      [place] Preop Guidelines    Revised Cardiac Risk Index

## 2018-10-01 NOTE — LETTER
[facility]   [address] [place]  [address]  [phone]                                   October 2, 2018    Gucci Logan  [address] [facility] [address]        Dear Gucci,    Your pre-op labs are okay.    Results for orders placed or performed in visit on 10/01/18   CBC with platelets differential   Result Value Ref Range    WBC 6.2 4.0 - 11.0 10e9/L    RBC Count 4.70 4.4 - 5.9 10e12/L    Hemoglobin 15.6 g/dL    Hematocrit 47.0 40.0 - 53.0 %     78 - 100 fl    MCH 33.2 (H) 26.5 - 33.0 pg    MCHC 33.2 31.5 - 36.5 g/dL    RDW 13.6 10.0 - 15.0 %    Platelet Count 99 (L) 150 - 450 10e9/L    % Neutrophils 70.6 %    % Lymphocytes 16.6 %    % Monocytes 9.7 %    % Eosinophils 2.8 %    % Basophils 0.3 %    Absolute Neutrophil 4.4 1.6 - 8.3 10e9/L    Absolute Lymphocytes 1.0 0.8 - 5.3 10e9/L    Absolute Monocytes 0.6 0.0 - 1.3 10e9/L    Absolute Eosinophils 0.2 0.0 - 0.7 10e9/L    Absolute Basophils 0.0 0.0 - 0.2 10e9/L    Diff Method Automated Method     Macrocytes Present     Platelet Estimate       Automated count confirmed.  Platelet morphology is normal.   Potassium   Result Value Ref Range    Potassium 4.1 3.4 - 5.3 mmol/L   Creatinine   Result Value Ref Range    Creatinine 1.01 0.66 - 1.25 mg/dL    GFR Estimate 74 >60 mL/min/1.7m2    GFR Estimate If Black 89 >60 mL/min/1.7m2       If you have any questions please call the clinic at [phone]    Sincerely,    Richi Jaramillo MD  hnr

## 2018-10-01 NOTE — PATIENT INSTRUCTIONS
Before Your Surgery      Call your surgeon if there is any change in your health. This includes signs of a cold or flu (such as a sore throat, runny nose, cough, rash or fever).    Do not smoke, drink alcohol or take over the counter medicine (unless your surgeon or primary care doctor tells you to) for the 24 hours before and after surgery.    If you take prescribed drugs: Follow your doctor s orders about which medicines to take and which to stop until after surgery.    Eating and drinking prior to surgery: follow the instructions from your surgeon    Take a shower or bath the night before surgery. Use the soap your surgeon gave you to gently clean your skin. If you do not have soap from your surgeon, use your regular soap. Do not shave or scrub the surgery site.  Wear clean pajamas and have clean sheets on your bed.       Avoid aspirin and ibuprofen type meds for a week prior to procedure    Take other meds early am of procedure ( blood pressure meds are main ones to take )

## 2018-10-01 NOTE — MR AVS SNAPSHOT
After Visit Summary   10/1/2018    Gucci Logan    MRN: [id number]           Patient Information     Date Of Birth          1950        Visit Information        Provider Department      10/1/2018 8:20 AM Richi Jaramillo MD [facility]        Today's Diagnoses     Preop general physical exam    -  1    Droopy eyelid, bilateral        Congenital blocked tear duct of left eye          Care Instructions      Before Your Surgery      Call your surgeon if there is any change in your health. This includes signs of a cold or flu (such as a sore throat, runny nose, cough, rash or fever).    Do not smoke, drink alcohol or take over the counter medicine (unless your surgeon or primary care doctor tells you to) for the 24 hours before and after surgery.    If you take prescribed drugs: Follow your doctor s orders about which medicines to take and which to stop until after surgery.    Eating and drinking prior to surgery: follow the instructions from your surgeon    Take a shower or bath the night before surgery. Use the soap your surgeon gave you to gently clean your skin. If you do not have soap from your surgeon, use your regular soap. Do not shave or scrub the surgery site.  Wear clean pajamas and have clean sheets on your bed.       Avoid aspirin and ibuprofen type meds for a week prior to procedure    Take other meds early am of procedure ( blood pressure meds are main ones to take )              Follow-ups after your visit        Who to contact     If you have questions or need follow up information about today's clinic visit or your schedule please contact [facility] directly at [phone].  Normal or non-critical lab and imaging results will be communicated to you by MyChart, letter or phone within 4 business days after the clinic has received the results. If you do not hear from us within 7 days, please contact the clinic through Shoopit or  phone. If you have a critical or abnormal lab result, we will notify you by phone as soon as possible.  Submit refill requests through Budge or call your pharmacy and they will forward the refill request to us. Please allow 3 business days for your refill to be completed.          Additional Information About Your Visit        Your Vitals Were     Pulse Temperature Pulse Oximetry BMI (Body Mass Index)          75 97.3  F (36.3  C) (Oral) 96% 37.55 kg/m2         Blood Pressure from Last 3 Encounters:   10/01/18 135/73   08/17/18 138/67   03/29/18 150/79    Weight from Last 3 Encounters:   10/01/18 269 lb 4 oz (122.1 kg)   08/17/18 270 lb (122.5 kg)   03/29/18 290 lb (131.5 kg)              We Performed the Following     CBC with platelets differential     Creatinine     Potassium        Primary Care Provider Office Phone # Fax #    Richi Jaramillo [phone]       [address] [facility] [address]        Equal Access to Services     CHR[facility]: Hadii aad ku hadasho Soomaali, waaxda luqadaha, qaybta kaalmada adeegyada, waxay idiin haymorgann myron herrera . So [facility] [phone].    ATENCIÓN: Si habla español, tiene a khoury disposición servicios gratuitos de asistencia lingüística. Ll[facility] [phone].    We comply with applicable federal civil rights laws and [place] laws. We do not discriminate on the basis of race, color, national origin, age, disability, sex, sexual orientation, or gender identity.            Thank you!     Thank you for choosing [facility]  for your care. Our goal is always to provide you with excellent care. Hearing back from our patients is one way we can continue to improve our services. Please take a few minutes to complete the written survey that you may receive in the mail after your visit with us. Thank you!             Your Updated Medication List - Protect others around you: Learn how to safely use, store and throw away your  medicines at www.disposemymeds.org.          This list is accurate as of 10/1/18  8:59 AM.  Always use your most recent med list.                   Brand Name Dispense Instructions for use Diagnosis    amLODIPine 2.5 MG tablet    NORVASC    90 tablet    Take 1 tablet (2.5 mg) by mouth daily    Hypertension goal BP (blood pressure) < 140/90       diazepam 5 MG tablet    VALIUM    10 tablet    1/2 to 1 po every 6 hours as needed for anxiety    Anxiety       hydrochlorothiazide 25 MG tablet    HYDRODIURIL    90 tablet    Take 1 tablet (25 mg) by mouth daily    Hypertension goal BP (blood pressure) < 140/90       HYDROcodone-acetaminophen 5-325 MG per tablet    NORCO    30 tablet    Take 1 tablet by mouth every 6 hours as needed for moderate to severe pain    Left shoulder pain, unspecified chronicity, Right knee pain, unspecified chronicity       losartan 100 MG tablet    COZAAR    90 tablet    Take 1 tablet (100 mg) by mouth daily    Hypertension goal BP (blood pressure) < 140/90       LYRICA 50 MG capsule   Generic drug:  pregabalin     90 capsule    TAKE ONE CAPSULE BY MOUTH THREE TIMES DAILY    Neuropathy       metoprolol succinate 50 MG 24 hr tablet    TOPROL-XL    90 tablet    Take 1 tablet (50 mg) by mouth daily    Hypertension goal BP (blood pressure) < 140/90       MULTIVITAMIN ADULT PO           omeprazole 20 MG CR capsule    priLOSEC    90 capsule    TAKE ONE CAPSULE BY MOUTH ONCE DAILY.  TAKE 30-60 MINUTES BEFORE A MEAL.    Gastroesophageal reflux disease without esophagitis       tiZANidine 2 MG tablet    ZANAFLEX    90 tablet    Take 1 tablet (2 mg) by mouth 3 times daily as needed for muscle spasms    Muscle pain       vitamin D 2000 units Caps     90 capsule    1 po daily    Vitamin D deficiency disease

## 2018-10-02 NOTE — PROGRESS NOTES
Your pre-op labs are okay.    Richi Jaramillo MD    TC - fax to [facility] preop  Thanks  Richi Jaramillo MD

## 2018-10-12 NOTE — H&P (VIEW-ONLY)
[address] [address] [phone]  Dept: [phone]    PRE-OP EVALUATION:  Today's date: 10/1/2018    Gucci Logan (: 1950) presents for pre-operative evaluation assessment as requested by Dr. Krause.  He requires evaluation and anesthesia risk assessment prior to undergoing surgery/procedure for treatment of eye .    Proposed Surgery/ Procedure: left eye lid blepharoplasty  Date of Surgery/ Procedure: 10/16/2018  Time of Surgery/ Procedure:   Hospital/Surgical Facility: [facility]  Fax number for surgical facility: [phone]  Primary Physician: Richi Jaramillo  Type of Anesthesia Anticipated: Local    Patient has a Health Care Directive or Living Will:  NO    1. NO - Do you have a history of heart attack, stroke, stent, bypass or surgery on an artery in the head, neck, heart or legs?  2. NO - Do you ever have any pain or discomfort in your chest?  3. NO - Do you have a history of  Heart Failure?  4. NO - Are you troubled by shortness of breath when: walking on the level, up a slight hill or at night?  5. NO - Do you currently have a cold, bronchitis or other respiratory infection?  6. NO - Do you have a cough, shortness of breath or wheezing?  7. NO - Do you sometimes get pains in the calves of your legs when you walk?  8. NO - Do you or anyone in your family have previous history of blood clots?  9. NO - Do you or does anyone in your family have a serious bleeding problem such as prolonged bleeding following surgeries or cuts?  10. NO - Have you ever had problems with anemia or been told to take iron pills?  11. NO - Have you had any abnormal blood loss such as black, tarry or bloody stools, or abnormal vaginal bleeding?  12. NO - Have you ever had a blood transfusion?  13. NO - Have you or any of your relatives ever had problems with anesthesia?  14. YES - DO YOU HAVE SLEEP APNEA, EXCESSIVE SNORING OR DAYTIME  DROWSINESS? Sleep apnea does not use a CPAP machine  15. NO - Do you have any prosthetic heart valves?  16. NO - Do you have prosthetic joints?  17. NO - Is there any chance that you may be pregnant?      HPI:     HPI related to upcoming procedure: 68 year old male with drooping eyelids and blocked tear duct   To have procedure later this month, per specialist at [facility]           MEDICAL HISTORY:     Patient Active Problem List    Diagnosis Date Noted     Hyperlipidemia LDL goal <100 04/19/2017     Priority: Medium     Impaired fasting glucose 04/19/2017     Priority: Medium     Gastroesophageal reflux disease, esophagitis presence not specified 10/06/2016     Priority: Medium     Obesity, unspecified obesity severity, unspecified obesity type 12/17/2015     Priority: Medium     Anxiety 11/27/2014     Priority: Medium     Hypertension goal BP (blood pressure) < 140/90 02/08/2013     Priority: Medium     Advanced directives, counseling/discussion 01/16/2013     Priority: Medium     Advance Care Planning:   ACP Review and Resources Provided:  Reviewed chart for advance care plan.  Gucci Logan has no plan or code status on file however states presence of ACP document. Copy requested. Confirmed code status reflects current choices pending receipt of document/advance care plan review. Confirmed/documented designated decision maker(s). See permanent comments section of demographics in clinical tab.   Added by Elyssa Olguin on 7/22/2014  Patient states has Advance Directive and will bring in a copy to clinic. 1/16/2013              CARDIOVASCULAR SCREENING; LDL GOAL LESS THAN 130 01/16/2013     Priority: Medium      Past Medical History:   Diagnosis Date     Arthritis      Esophageal reflux 3/1/2014     Hearing problem      Hiatal hernia      Hypertension      Liver disease      Obesity 1/24/2013     Obstructive sleep apnea      Sleep apnea     Advised CPAP machine.  Not keen to use it.      Past Surgical History:   Procedure Laterality Date     ARTHROSCOPY KNEE RT/LT      (L) with partial medial meniscectomy     C OPEN RX ANKLE DISLOCATN+FIXATN      (R)     C SPINAL FUSION,ANT,EA ADNL LEVEL      T12 - L1     CATARACT IOL, RT/LT  Nov and Dec 2017     ENDOSCOPIC ENDONASAL SURGERY  1994     ENDOSCOPY  2-19-15     EYE SURGERY       Hernia surgery Left 1994     NASAL/SINUS POLYPECTOMY       ROTATOR CUFF REPAIR RT/LT Right      Current Outpatient Prescriptions   Medication Sig Dispense Refill     amLODIPine (NORVASC) 2.5 MG tablet Take 1 tablet (2.5 mg) by mouth daily 90 tablet 1     Cholecalciferol (VITAMIN D) 2000 UNITS CAPS 1 po daily 90 capsule 3     diazepam (VALIUM) 5 MG tablet 1/2 to 1 po every 6 hours as needed for anxiety 10 tablet 0     hydrochlorothiazide (HYDRODIURIL) 25 MG tablet Take 1 tablet (25 mg) by mouth daily 90 tablet 1     HYDROcodone-acetaminophen (NORCO) 5-325 MG per tablet Take 1 tablet by mouth every 6 hours as needed for moderate to severe pain 30 tablet 0     losartan (COZAAR) 100 MG tablet Take 1 tablet (100 mg) by mouth daily 90 tablet 1     LYRICA 50 MG capsule TAKE ONE CAPSULE BY MOUTH THREE TIMES DAILY 90 capsule 5     metoprolol succinate (TOPROL-XL) 50 MG 24 hr tablet Take 1 tablet (50 mg) by mouth daily 90 tablet 1     Multiple Vitamins-Minerals (MULTIVITAMIN ADULT PO)        omeprazole (PRILOSEC) 20 MG CR capsule TAKE ONE CAPSULE BY MOUTH ONCE DAILY.  TAKE 30-60 MINUTES BEFORE A MEAL. 90 capsule 3     tiZANidine (ZANAFLEX) 2 MG tablet Take 1 tablet (2 mg) by mouth 3 times daily as needed for muscle spasms 90 tablet 1     OTC products: allergy meds as needed    Allergies   Allergen Reactions     Influenza Vac Typ Other (See Comments)     Delirium, fever      Latex Allergy: NO    Social History   Substance Use Topics     Smoking status: Former Smoker     Years: 5.00     Types: Cigarettes     Start date: 1/1/1990     Quit date: 1/1/1999     Smokeless  tobacco: Never Used     Alcohol use No      Comment: quit in 1999     History   Drug Use No       REVIEW OF SYSTEMS:   Constitutional, neuro, ENT, endocrine, pulmonary, cardiac, gastrointestinal, genitourinary, musculoskeletal, integument and psychiatric systems are negative, except as otherwise noted.    Patient recent antibiotics for sinus.  Doing somewhat better now.    Also took claritin/ benadryl recently for allergies    Some flaring of pain in back/ ankle/ knee    EXAM:   /73 (BP Location: Left arm, Patient Position: Chair, Cuff Size: Adult Regular)  Pulse 75  Temp 97.3  F (36.3  C) (Oral)  Wt 269 lb 4 oz (122.1 kg)  SpO2 96%  BMI 37.55 kg/m2    GENERAL APPEARANCE: healthy, alert and no distress     HENT: ear canals and TM's normal and nose and mouth without ulcers or lesions     NECK: no adenopathy, no asymmetry, masses, or scars and thyroid normal to palpation     RESP: lungs clear to auscultation - no rales, rhonchi or wheezes     CV: regular rates and rhythm, normal S1 S2, no S3 or S4 and no murmur, click or rub     ABDOMEN:  soft, nontender, no HSM or masses and bowel sounds normal     MS: extremities normal- no gross deformities noted, no evidence of inflammation in joints, FROM in all extremities.     SKIN: no suspicious lesions or rashes     NEURO: Normal strength and tone, sensory exam grossly normal, mentation intact and speech normal     PSYCH: mentation appears normal. and affect normal/bright     LYMPHATICS: No cervical adenopathy    DIAGNOSTICS:   No ekg needed for eye exam    We did attach copy of the echo done last year    Labs done, pending    Of note, less than two month ago hemoglobin a1c was 7.1 ( patient not on any diabetes meds )    Recent Labs   Lab Test  08/10/18   0924  02/23/18   0929  11/27/17   1730   08/18/17   0841   06/24/15   0953   HGB  15.1  15.8  15.8   < >  14.9   < >  14.6   PLT  82*  99*  124*   < >  81*   < >  77*   INR   --    --   1.05   --    --    --    1.05   NA  142  138  140   --   140   < >  143   POTASSIUM  4.2  3.7  4.0   < >  3.7   < >  4.0   CR  0.96  0.91  1.07   < >  1.03   < >  0.97   A1C  7.1*   --    --    --   7.3*   < >  5.9    < > = values in this interval not displayed.        IMPRESSION:   Reason for surgery/procedure: eyelid and tear duct problems  Diagnosis/reason for consult: pre-op clearance     The proposed surgical procedure is considered LOW risk.    REVISED CARDIAC RISK INDEX  The patient has the following serious cardiovascular risks for perioperative complications such as (MI, PE, VFib and 3  AV Block):  No serious cardiac risks  INTERPRETATION: 0 risks: Class I (very low risk - 0.4% complication rate)    The patient has the following additional risks for perioperative complications:  No identified additional risks      ICD-10-CM    1. Preop general physical exam Z01.818        RECOMMENDATIONS:          --Patient is to take all scheduled medications on the day of surgery EXCEPT for modifications listed below.    Patient will avoid aspirin and ibuprofen type meds for one week prior    He will take other meds early the am of procedure    Of note, hemoglobin a1c was in mild diabetes range recently but he is not on any diabetes meds. Just working on diet/ exercise/ wt loss.    No history of anesthesia problems    Patient has past history of liver problems.  Went through treatment for hepatitis C.    He has had low platelets for year, but stable.  No bleeding or clotting problems.    APPROVAL GIVEN to proceed with proposed procedure, without further diagnostic evaluation, providing labs are okay       Signed Electronically by: Richi Jaramillo MD    Copy of this evaluation report is provided to requesting physician.    Addendum: patient also needed refill of the hydrocodone.  Uses sparingly.  Richi Jaramillo MD      [facility] Preop Guidelines    Revised Cardiac Risk Index

## 2018-10-16 ENCOUNTER — SURGERY (OUTPATIENT)
Age: 68
End: 2018-10-16

## 2018-10-16 ENCOUNTER — HOSPITAL ENCOUNTER (OUTPATIENT)
Facility: CLINIC | Age: 68
Discharge: HOME | End: 2018-10-16
Attending: OPHTHALMOLOGY | Admitting: OPHTHALMOLOGY
Payer: COMMERCIAL

## 2018-10-16 ENCOUNTER — HOSPITAL ENCOUNTER (OUTPATIENT)
Dept: ADMISSION | Facility: CLINIC | Age: 68
Discharge: HOME | End: 2018-10-16
Attending: OPHTHALMOLOGY | Admitting: OPHTHALMOLOGY

## 2018-10-16 ENCOUNTER — ANESTHESIA (OUTPATIENT)
Dept: SURGERY | Facility: CLINIC | Age: 68
End: 2018-10-16
Payer: COMMERCIAL

## 2018-10-16 ENCOUNTER — ANESTHESIA EVENT (OUTPATIENT)
Dept: SURGERY | Facility: CLINIC | Age: 68
End: 2018-10-16
Payer: COMMERCIAL

## 2018-10-16 VITALS
RESPIRATION RATE: 16 BRPM | DIASTOLIC BLOOD PRESSURE: 74 MMHG | SYSTOLIC BLOOD PRESSURE: 164 MMHG | BODY MASS INDEX: 37.55 KG/M2 | HEIGHT: 71 IN | OXYGEN SATURATION: 93 %

## 2018-10-16 PROCEDURE — 71000013 ZZH RECOVERY PHASE 1 LEVEL 1 EA ADDTL HR: Performed by: OPHTHALMOLOGY

## 2018-10-16 PROCEDURE — 36000058 ZZH SURGERY LEVEL 3 EA 15 ADDTL MIN

## 2018-10-16 PROCEDURE — 36000058 ZZH SURGERY LEVEL 3 EA 15 ADDTL MIN: Performed by: OPHTHALMOLOGY

## 2018-10-16 PROCEDURE — 71000012 ZZH RECOVERY PHASE 1 LEVEL 1 FIRST HR: Performed by: OPHTHALMOLOGY

## 2018-10-16 PROCEDURE — 40000170 ZZH STATISTIC PRE-PROCEDURE ASSESSMENT II: Performed by: OPHTHALMOLOGY

## 2018-10-16 PROCEDURE — 36000056 ZZH SURGERY LEVEL 3 1ST 30 MIN: Performed by: OPHTHALMOLOGY

## 2018-10-16 PROCEDURE — 25000125 ZZHC RX 250: Performed by: OPHTHALMOLOGY

## 2018-10-16 PROCEDURE — 27810169 ZZH OR IMPLANT GENERAL: Performed by: OPHTHALMOLOGY

## 2018-10-16 PROCEDURE — 37000008 ZZH ANESTHESIA TECHNICAL FEE, 1ST 30 MIN: Performed by: OPHTHALMOLOGY

## 2018-10-16 PROCEDURE — 25000128 H RX IP 250 OP 636: Performed by: ANESTHESIOLOGY

## 2018-10-16 PROCEDURE — 37000009 ZZH ANESTHESIA TECHNICAL FEE, EACH ADDTL 15 MIN: Performed by: OPHTHALMOLOGY

## 2018-10-16 PROCEDURE — 27210794 ZZH OR GENERAL SUPPLY STERILE: Performed by: OPHTHALMOLOGY

## 2018-10-16 PROCEDURE — 25000132 ZZH RX MED GY IP 250 OP 250 PS 637: Performed by: ANESTHESIOLOGY

## 2018-10-16 PROCEDURE — 37000009 ZZH ANESTHESIA TECHNICAL FEE, EACH ADDTL 15 MIN

## 2018-10-16 PROCEDURE — 71000027 ZZH RECOVERY PHASE 2 EACH 15 MINS: Performed by: OPHTHALMOLOGY

## 2018-10-16 DEVICE — EYE STENT LACRIMAL LRG LIS052: Type: IMPLANTABLE DEVICE | Site: LACRIMAL DUCT | Status: FUNCTIONAL

## 2018-10-16 RX ORDER — HYDROMORPHONE HYDROCHLORIDE 1 MG/ML
.3-.5 INJECTION, SOLUTION INTRAMUSCULAR; INTRAVENOUS; SUBCUTANEOUS EVERY 5 MIN PRN
Status: DISCONTINUED | OUTPATIENT
Start: 2018-10-16 | End: 2018-10-16 | Stop reason: HOSPADM

## 2018-10-16 RX ORDER — PROPOFOL 10 MG/ML
INJECTION, EMULSION INTRAVENOUS PRN
Status: DISCONTINUED | OUTPATIENT
Start: 2018-10-16 | End: 2018-10-16

## 2018-10-16 RX ORDER — NEOMYCIN SULFATE, POLYMYXIN B SULFATE AND DEXAMETHASONE 3.5; 10000; 1 MG/ML; [USP'U]/ML; MG/ML
SUSPENSION/ DROPS OPHTHALMIC PRN
Status: DISCONTINUED | OUTPATIENT
Start: 2018-10-16 | End: 2018-10-16 | Stop reason: HOSPADM

## 2018-10-16 RX ORDER — FENTANYL CITRATE 50 UG/ML
INJECTION, SOLUTION INTRAMUSCULAR; INTRAVENOUS PRN
Status: DISCONTINUED | OUTPATIENT
Start: 2018-10-16 | End: 2018-10-16

## 2018-10-16 RX ORDER — NALOXONE HYDROCHLORIDE 0.4 MG/ML
.1-.4 INJECTION, SOLUTION INTRAMUSCULAR; INTRAVENOUS; SUBCUTANEOUS
Status: DISCONTINUED | OUTPATIENT
Start: 2018-10-16 | End: 2018-10-16 | Stop reason: HOSPADM

## 2018-10-16 RX ORDER — ERYTHROMYCIN 5 MG/G
OINTMENT OPHTHALMIC PRN
Status: DISCONTINUED | OUTPATIENT
Start: 2018-10-16 | End: 2018-10-16 | Stop reason: HOSPADM

## 2018-10-16 RX ORDER — LIDOCAINE HCL/EPINEPHRINE/PF 2%-1:200K
VIAL (ML) INJECTION PRN
Status: DISCONTINUED | OUTPATIENT
Start: 2018-10-16 | End: 2018-10-16 | Stop reason: HOSPADM

## 2018-10-16 RX ORDER — HYDROCODONE BITARTRATE AND ACETAMINOPHEN 5; 325 MG/1; MG/1
1 TABLET ORAL ONCE
Status: COMPLETED | OUTPATIENT
Start: 2018-10-16 | End: 2018-10-16

## 2018-10-16 RX ORDER — CEFAZOLIN SODIUM 1 G/3ML
INJECTION, POWDER, FOR SOLUTION INTRAMUSCULAR; INTRAVENOUS PRN
Status: DISCONTINUED | OUTPATIENT
Start: 2018-10-16 | End: 2018-10-16

## 2018-10-16 RX ORDER — TETRACAINE HYDROCHLORIDE 5 MG/ML
SOLUTION OPHTHALMIC PRN
Status: DISCONTINUED | OUTPATIENT
Start: 2018-10-16 | End: 2018-10-16 | Stop reason: HOSPADM

## 2018-10-16 RX ORDER — ERYTHROMYCIN 5 MG/G
OINTMENT OPHTHALMIC
Status: DISCONTINUED
Start: 2018-10-16 | End: 2018-10-16 | Stop reason: HOSPADM

## 2018-10-16 RX ORDER — SODIUM CHLORIDE, SODIUM LACTATE, POTASSIUM CHLORIDE, CALCIUM CHLORIDE 600; 310; 30; 20 MG/100ML; MG/100ML; MG/100ML; MG/100ML
INJECTION, SOLUTION INTRAVENOUS CONTINUOUS PRN
Status: DISCONTINUED | OUTPATIENT
Start: 2018-10-16 | End: 2018-10-16

## 2018-10-16 RX ORDER — ONDANSETRON 2 MG/ML
4 INJECTION INTRAMUSCULAR; INTRAVENOUS EVERY 30 MIN PRN
Status: DISCONTINUED | OUTPATIENT
Start: 2018-10-16 | End: 2018-10-16 | Stop reason: HOSPADM

## 2018-10-16 RX ORDER — ONDANSETRON 2 MG/ML
INJECTION INTRAMUSCULAR; INTRAVENOUS PRN
Status: DISCONTINUED | OUTPATIENT
Start: 2018-10-16 | End: 2018-10-16

## 2018-10-16 RX ORDER — FENTANYL CITRATE 50 UG/ML
25-50 INJECTION, SOLUTION INTRAMUSCULAR; INTRAVENOUS
Status: DISCONTINUED | OUTPATIENT
Start: 2018-10-16 | End: 2018-10-16 | Stop reason: HOSPADM

## 2018-10-16 RX ORDER — NEOMYCIN SULFATE, POLYMYXIN B SULFATE AND DEXAMETHASONE 3.5; 10000; 1 MG/ML; [USP'U]/ML; MG/ML
SUSPENSION/ DROPS OPHTHALMIC
Status: DISCONTINUED
Start: 2018-10-16 | End: 2018-10-16 | Stop reason: HOSPADM

## 2018-10-16 RX ORDER — SODIUM CHLORIDE, SODIUM LACTATE, POTASSIUM CHLORIDE, CALCIUM CHLORIDE 600; 310; 30; 20 MG/100ML; MG/100ML; MG/100ML; MG/100ML
INJECTION, SOLUTION INTRAVENOUS CONTINUOUS
Status: DISCONTINUED | OUTPATIENT
Start: 2018-10-16 | End: 2018-10-16 | Stop reason: HOSPADM

## 2018-10-16 RX ORDER — ONDANSETRON 4 MG/1
4 TABLET, ORALLY DISINTEGRATING ORAL EVERY 30 MIN PRN
Status: DISCONTINUED | OUTPATIENT
Start: 2018-10-16 | End: 2018-10-16 | Stop reason: HOSPADM

## 2018-10-16 RX ADMIN — FENTANYL CITRATE 50 MCG: 50 INJECTION, SOLUTION INTRAMUSCULAR; INTRAVENOUS at 14:45

## 2018-10-16 RX ADMIN — PROPOFOL 20 MG: 10 INJECTION, EMULSION INTRAVENOUS at 12:33

## 2018-10-16 RX ADMIN — FENTANYL CITRATE 25 MCG: 50 INJECTION, SOLUTION INTRAMUSCULAR; INTRAVENOUS at 12:34

## 2018-10-16 RX ADMIN — PROPOFOL 20 MG: 10 INJECTION, EMULSION INTRAVENOUS at 12:37

## 2018-10-16 RX ADMIN — FENTANYL CITRATE 50 MCG: 50 INJECTION, SOLUTION INTRAMUSCULAR; INTRAVENOUS at 14:23

## 2018-10-16 RX ADMIN — LIDOCAINE HYDROCHLORIDE,EPINEPHRINE BITARTRATE 5 ML: 20; .005 INJECTION, SOLUTION EPIDURAL; INFILTRATION; INTRACAUDAL; PERINEURAL at 13:05

## 2018-10-16 RX ADMIN — ERYTHROMYCIN 1 G: 5 OINTMENT OPHTHALMIC at 13:17

## 2018-10-16 RX ADMIN — NEOMYCIN SULFATE, POLYMYXIN B SULFATE AND DEXAMETHASONE 2 DROP: 3.5; 10000; 1 SUSPENSION OPHTHALMIC at 13:51

## 2018-10-16 RX ADMIN — FENTANYL CITRATE 25 MCG: 50 INJECTION, SOLUTION INTRAMUSCULAR; INTRAVENOUS at 12:32

## 2018-10-16 RX ADMIN — ONDANSETRON 4 MG: 2 INJECTION INTRAMUSCULAR; INTRAVENOUS at 12:46

## 2018-10-16 RX ADMIN — PROPOFOL 20 MG: 10 INJECTION, EMULSION INTRAVENOUS at 12:35

## 2018-10-16 RX ADMIN — Medication 3 ML: at 12:42

## 2018-10-16 RX ADMIN — ERYTHROMYCIN 2 G: 5 OINTMENT OPHTHALMIC at 12:32

## 2018-10-16 RX ADMIN — PROPOFOL 30 MG: 10 INJECTION, EMULSION INTRAVENOUS at 12:31

## 2018-10-16 RX ADMIN — LIDOCAINE HYDROCHLORIDE,EPINEPHRINE BITARTRATE 9 ML: 20; .005 INJECTION, SOLUTION EPIDURAL; INFILTRATION; INTRACAUDAL; PERINEURAL at 12:41

## 2018-10-16 RX ADMIN — LIDOCAINE HYDROCHLORIDE,EPINEPHRINE BITARTRATE 4 ML: 20; .005 INJECTION, SOLUTION EPIDURAL; INFILTRATION; INTRACAUDAL; PERINEURAL at 13:46

## 2018-10-16 RX ADMIN — SODIUM CHLORIDE, SODIUM LACTATE, POTASSIUM CHLORIDE, CALCIUM CHLORIDE: 600; 310; 30; 20 INJECTION, SOLUTION INTRAVENOUS at 12:27

## 2018-10-16 RX ADMIN — HYDROCODONE BITARTRATE AND ACETAMINOPHEN 1 TABLET: 5; 325 TABLET ORAL at 14:28

## 2018-10-16 RX ADMIN — FENTANYL CITRATE 50 MCG: 50 INJECTION, SOLUTION INTRAMUSCULAR; INTRAVENOUS at 12:28

## 2018-10-16 RX ADMIN — CEFAZOLIN SODIUM 3 G: 1 INJECTION, POWDER, FOR SOLUTION INTRAMUSCULAR; INTRAVENOUS at 12:38

## 2018-10-16 RX ADMIN — TETRACAINE HYDROCHLORIDE 2 DROP: 5 SOLUTION OPHTHALMIC at 12:32

## 2018-10-16 ASSESSMENT — LIFESTYLE VARIABLES: TOBACCO_USE: 1

## 2018-10-16 NOTE — IP AVS SNAPSHOT
[facility]    [address]    [place] [address]    Phone:  [phone]    Fax:  [phone]                                       After Visit Summary   10/16/2018    Gucci Logan    MRN: [id number]           After Visit Summary Signature Page     I have received my discharge instructions, and my questions have been answered. I have discussed any challenges I see with this plan with the nurse or doctor.    ..........................................................................................................................................  Patient/Patient Representative Signature      ..........................................................................................................................................  Patient Representative Print Name and Relationship to Patient    ..................................................               ................................................  Date                                   Time    ..........................................................................................................................................  Reviewed by Signature/Title    ...................................................              ..............................................  Date                                               Time          22EPIC Rev 08/18

## 2018-10-16 NOTE — OP NOTE
Preoperative Diagnosis: Left Upper Eyelid Ptosis  Postoperative Diagnosis: Left Upper Eyelid Ptosis  Operation: Repair Left Upper Eyelid Ptosis    Anesthesia: Local     Complications: None    Indications for Surgery:Ptosis    Procedure: The patient was taken to the operating room and received a local block of 2% lidocaine without epinephrine. The block was administered transcutaneously along the eyelid crease and the planned incision line was outlined with a marking pen. The patient was then prepped and draped in the usual sterile fashion. The incision was then made along the previously marked area. A moderate amount of skin was excised taking care to allow adequate closure and avoid lagophthalmos. [place]scott scissors were then used to develop a plane over the septum. The septum was opened and a small amount of orbital fat was removed. Levator aponeurosis was then disinserted from the tarsus and a plane was developed between the aponeurosis and the underlying tarsus and Cruz's muscle. A 5-0 Mersilene suture was then passed through the tarsus in a lamellar fashion and externalized through the levator aponeurosis. This was tied off in a temporary fashion and the patient was asked to sit up and the eyelids height and contour evaluated. This was repeated until a satisfactory height and contour were obtained, and two additional sutures were placed lateral to the initial suture. This resulted in a normal contour and height to the left upper eyelid. Attempted overcorrection of 0.5 mm was obtained. The redundant levator aponeurosis was then excised and the skin was then closed with a running 6-0 fast absorbing suture. The patient left the operating room in stable condition.Preoperative Diagnosis: Left nasal lacrimal duct obstruction  Postoperative Diagnosis: Same  Operation: Left Dacryocystorhinostomy    Anesthesia: General    Complications: None    Indications for Surgery: The patient has had history of dacryocystitis.  The patient presented for dacryocystorhinostomy    Procedure: The patient was taken to the operating room and was placed under general anesthesia. The proposed skin incision lines were outlined with a marking pen from approximately 1/2 cm above the medial canthal tendon to approximately 1 cm below the medical canthal tendon. This area was injected with 1% Lidocaine with 1:100,000 epinephrine with Wydase. Additional anesthetic was injected through the caruncle into the ethmoid sinuses. The patient was then prepped and draped in the usual sterile fashion. #15 blade was used to incise the skin and then curved Ni scissors were used to bluntly dissect tissue down to the level of the periosteum. Cutting cautery was then used to fully expose the underlying bone and then a Sherwood elevator was used to lift the periosteum away from the lacrimal fossa. A sy probe was then passed through the canaliculus and used to tent up the lacrimal sac. Lacrimal sac was opened with a Mentasta blade and the sac was inspected to the degree possible given the patient s anatomy. No significant abnormalities were identified and the medial wall of the sac was then removed. The bone of the lacrimal fossa was then perforated with a curved hermostat and the opening was enlarged with various sized Kerrison rongeurs until a hole the size of a dime was created. The nasal mucosa was then opened with cutting cautery and an opening was created that was also approximately the size of a dime. A oliveira probe and tubing was then passed through the canalicular system into the nose and out through the nares. A second oliveira probe was passed through the inferior canaliculus in the similar fashion. A 1/4 inch penrose drain was drawn up into the lacrimal sac and sutured in place with a two 5-0 Vicryl sutures. The deep tissues were then close with a interrupted 5-0 Vicryl sutures and the subcuticular tissue was also closed with interrupted 5-0 Vicryl  suture. The skin was closed with a running 6-0 chromic suture. The tubing was then spliced and the internal suture was tied on itself and then the tubing was tied to the lateral wall of the nose with a 5-0 Prolene suture. At the end of the procedure there was good hemostasis. A nasal sling was then placed over the nares and erythromycin ointment was applied. The patient left the operating room in stable condition.  Preoperative Diagnosis: Bilateral Eyebrow Ptosis  Post Operative Diagnosis: Bilateral Eyebrow Ptosis    Operation: Repair of bilateral eyebrow ptosis    Anesthesia: Local, monitored    Complications: None    Indications for Surgery: The patient had bilateral eyebrow ptosis with the eyebrows resting below the orbital rim, contributing to the visual obstruction.    Description of Procedure: The patient was taken into the operating room and the planned excision was outlined along the upper aspect of both eyebrows. The eyebrows were then injected with 2% lidocaine with 1:100,000 epinephrine. The patient was prepped and draped in the usual sterile fashion. An ellipse of skin and subcutaneous tissue was excised with a #15 blade and cutting cautery. Care was taken to allow for good eyelid closure. Hemostasis was obtained with the FiftyFiverlab cautery. Deep tissues were then closed with interrupted 5-0 Vicryl suture. The skin was closed with running 6-0 nylon suture, taking care to mansi the skin margins. The patient left the operating room in stable condition.

## 2018-10-16 NOTE — ANESTHESIA CARE TRANSFER NOTE
Patient: Gucci Logan    Procedure(s):  LEFT UPPER LID PTOSIS AND BILATERAL  BROW PTOSIS REPAIR WITH LEFT DACRYOCYSTORHINOSTOMY  - Wound Class: I-Clean   - Wound Class: I-Clean   - Wound Class: I-Clean    Diagnosis: LEFT UPPER LID PTOSIS AND BROW PTOSIS WITH LEFT NASAL LACRIMAL DUCT OBSTRUCTION   Diagnosis Additional Information: No value filed.    Anesthesia Type:   MAC     Note:  Airway :Room Air  Patient transferred to:PACU  Comments: Anesthesia Care Note    Patient: Gucci Logan    Transferred to: PACU    Patient vital signs: Stable    Airway: None    Monitors placed. VSS. PIV patent. No change in dentition. Report given to SIMONA Manuel CRNA   10/16/2018  Handoff Report: Identifed the Patient, Identified the Reponsible Provider, Reviewed the pertinent medical history, Discussed the surgical course, Reviewed Intra-OP anesthesia mangement and issues during anesthesia, Set expectations for post-procedure period and Allowed opportunity for questions and acknowledgement of understanding      Vitals: (Last set prior to Anesthesia Care Transfer)    CRNA VITALS  10/16/2018 1330 - 10/16/2018 1405      10/16/2018             Resp Rate (set): 10                Electronically Signed By: DIANE Escamilla CRNA  October 16, 2018  2:05 PM

## 2018-10-16 NOTE — ANESTHESIA POSTPROCEDURE EVALUATION
Patient: Gucci Logan    Procedure(s):  LEFT UPPER LID PTOSIS AND BILATERAL  BROW PTOSIS REPAIR WITH LEFT DACRYOCYSTORHINOSTOMY  - Wound Class: I-Clean   - Wound Class: I-Clean   - Wound Class: I-Clean    Diagnosis:LEFT UPPER LID PTOSIS AND BROW PTOSIS WITH LEFT NASAL LACRIMAL DUCT OBSTRUCTION   Diagnosis Additional Information: No value filed.    Anesthesia Type:  MAC    Note:  Anesthesia Post Evaluation    Patient location during evaluation: Bedside  Patient participation: Able to fully participate in evaluation  Level of consciousness: awake and alert  Pain management: adequate  Airway patency: patent  Cardiovascular status: acceptable  Respiratory status: acceptable  Hydration status: acceptable  PONV: none             Last vitals:  Vitals:    10/16/18 1445 10/16/18 1500 10/16/18 1600   BP: 165/75 163/80 164/74   Resp: 16 15 16   SpO2: 95% 94% 93%         Electronically Signed By: Prashant Sullivan MD  October 16, 2018  4:29 PM

## 2018-10-16 NOTE — DISCHARGE INSTRUCTIONS
Same Day Surgery Discharge Instructions for  Sedation and General Anesthesia       It's not unusual to feel dizzy, light-headed or faint for up to 24 hours after surgery or while taking pain medication.  If you have these symptoms: sit for a few minutes before standing and have someone assist you when you get up to walk or use the bathroom.      You should rest and relax for the next 24 hours. We recommend you make arrangements to have an adult stay with you for at least 24 hours after your discharge.  Avoid hazardous and strenuous activity.      DO NOT DRIVE any vehicle or operate mechanical equipment for 24 hours following the end of your surgery.  Even though you may feel normal, your reactions may be affected by the medication you have received.      Do not drink alcoholic beverages for 24 hours following surgery.       Slowly progress to your regular diet as you feel able. It's not unusual to feel nauseated and/or vomit after receiving anesthesia.  If you develop these symptoms, drink clear liquids (apple juice, ginger ale, broth, 7-up, etc. ) until you feel better.  If your nausea and vomiting persists for 24 hours, please notify your surgeon.        All narcotic pain medications, along with inactivity and anesthesia, can cause constipation. Drinking plenty of liquids and increasing fiber intake will help.      For any questions of a medical nature, call your surgeon.      Do not make important decisions for 24 hours.      If you had general anesthesia, you may have a sore throat for a couple of days related to the breathing tube used during surgery.  You may use Cepacol lozenges to help with this discomfort.  If it worsens or if you develop a fever, contact your surgeon.       If you feel your pain is not well managed with the pain medications prescribed by your surgeon, please contact your surgeon's office to let them know so they can address your concerns.     Dacryocystorhinostomy Discharge Instructions    [facility]  LEANN LEUNG M.D.  [address] . [address]  [place], [place] [address]  [phone]  Things to avoid:    You should avoid blowing the nose forcefully or heavy sneezing while any tubing is in place to keep the tubes from coming loose or displacing.  The inside of the nose should not be disturbed if at all possible.  If you have any drying or discomfort following your DCR on the inside of the nose, it is okay to apply a small amount of Vaseline gently to the area.  Additionally, saline nasal spray may be helpful. If you feel any stuffiness or congestion while any tubing is in place, we recommend nasal strips that can be bought at any drugstore and worn at night.    You will see a tiny (1/4 inch) loop of the silicone tubing extruding from the corner of your operated eye(s). Be careful not to disturb this area. If the silicone tubing does dislodge and extend into the eye, tape the tube using medical adhesive, to the side of the nose.  Call our office to set up an appointment to have the tube repositioned or removed.  The rubber tube inside your nose should fall out on its own in about 4-6 weeks. If it does not, call the office and the doctor can remove it in the office.  If the tube loosens or falls out sooner, it is okay because this tube was in place only to add extra bulk while the natural passage is forming and won t severely affect the overall outcome of the surgery.  Activity:  Please avoid heavy lifting or strenuous exercise for seven days after surgery. You may resume regular activities as tolerated. Air travel should also be avoided during this period of time. You may carefully shower on the second day after surgery.  At night, sleep with your head elevated on 2-3 pillows.  Using ice packs will help reduce bruising and swelling. Continue this until the swelling subsides.    Medication:  If the doctor has given you some medications to take after  surgery, please take these according to the instructions on the bottle. Pain medications may make you drowsy so do not drive, operate heavy machinery, or use alcohol while taking pain medications.    If you were taking Coumadin (warfarin) prior to your surgery, you may resume this medication with your next scheduled dose.  Bleeding:  After a DCR, it is normal to have some bloody discharge from the nose that may empty into the back of the throat. This will subside over the first one to two days after surgery. Please contact the doctor if there is excessive bleeding which does not respond to simple pressure on the nose.  Questions:  Please feel free to contact the office should any questions come up which are not answered above. The phone number is [phone].    [facility]   Eyelid/Orbital Surgery Discharge Instructions   Madhu Krause M.D..     ICE COMPRESSES  Immediately following surgery, you should begin to apply ice compresses.  Apply a cold gel pack or wrap a clean washcloth around a cup of crushed ice in a plastic bag (a bag of frozen peas also works well) and hold the cold compresses directly against the closed eyelid (s).Apply cold pack for a minimum of six times daily for no longer than 15 minutes at a time. Continue cold compresses every day until the bruising and swelling begin to subside.  This can vary for each patient, but three 3 days may be common.    HOT COMPRESSES  After your swelling and bruising have begun to subside, hot compresses should be applied.  Take a clean washcloth and wring it out in hot water (as warm as you can tolerate comfortably).  Hold this warm compress against the closed eyelid(s) at least six times per day for 15 minutes.  This should be continued for about two weeks.    OINTMENT  You may be given some ointment when you leave the hospital.  Apply this ointment to the suture line(s) twice a day, 1/4 inch into the eye at bedtime for 7 days.  Expect some  blurring of vision from the ointment.     ACTIVITY  Avoid heavy lifting or vigorous exercise for one week after surgery.  You may resume regular activities as tolerated.  You may shower and wash your hair on the day after surgery; be careful to avoid getting shampoo in your eyes. While your eyes are still swelling, it is recommended you sleep on your back and elevate your head with 2-3 pillows.    MEDICATION  If the doctor has given you some medications to take after surgery, please take these according to the instructions on the bottle.  Pain medications may make you drowsy so do not drive, operate heavy machinery, or use alcohol while taking it.  When you feel that you do not need the prescription pain medication, you may substitute Extra Strength Tylenol for mild pain by also following the directions on the bottle.    If you were taking Coumadin (warfarin) prior to your surgery, you may resume this medication with your next scheduled dose.    WHAT TO EXPECT  You should expect some slight oozing of blood from the incision site over the first two to three days after surgery.  Swelling and bruising will occur for one to two weeks or longer.  You may also experience itching and tearing during the first several weeks after surgery.  This is part of the normal healing process    QUESTIONS  Please feel free to contact the office, should you have any questions that are not answered above.  The phone number is [phone].  Please call immediately if you are unable to establish vision in the operative eye, you are experiencing heavy bleeding that will not stop with gentle pressure or you have any signs of an infection (greenish/yellow discharge or progressive redness).    Dr. Krause has prescribed Norco/Vicodin for pain for you. It's a combination medication of Tylenol 325 mg and Hydrocodone 5 mg.  There is a limit of 4000 mg of Tylenol per 24 hours.   You were given one of these pain pills at 2:30pm     You also were  prescribed Erythromycin ointment.  Apply per pharmacy label instructions.    [facility]  [place] [address]  [place], [place] [address]  [phone]

## 2018-10-16 NOTE — IP AVS SNAPSHOT
MRN:[id number]                      After Visit Summary   10/16/2018    Gucci Logan    MRN: [id number]           Thank you!     Thank you for choosing [facility] for your care. Our goal is always to provide you with excellent care. Hearing back from our patients is one way we can continue to improve our services. Please take a few minutes to complete the written survey that you may receive in the mail after you visit with us. Thank you!        Patient Information     Date Of Birth          1950        About your hospital stay     You were admitted on:  October 16, 2018 You last received care in the:  [facility] Same Day Surgery    You were discharged on:  October 16, 2018       Who to Call     For medical emergencies, please call 911.  For non-urgent questions about your medical care, please call your primary care provider or clinic, [phone]  For questions related to your surgery, please call your surgery clinic        Attending Provider     Provider Specialty    Madhu Krause MD Ophthalmology       Primary Care Provider Office Phone # Fax #    Richi Jaramillo [phone]      After Care Instructions     Activity       No lifting or bending over.  Avoid sleeping on operative side.            Call Physician       Call physician if active bleeding not stopped with direct compression and cold compress.            Do NOT blow nose       Do NOT blow nose on operative side for 7 days.                  Further instructions from your care team       Same Day Surgery Discharge Instructions for  Sedation and General Anesthesia       It's not unusual to feel dizzy, light-headed or faint for up to 24 hours after surgery or while taking pain medication.  If you have these symptoms: sit for a few minutes before standing and have someone assist you when you get up to walk or use the bathroom.      You should rest and relax for the next 24 hours. We recommend you  make arrangements to have an adult stay with you for at least 24 hours after your discharge.  Avoid hazardous and strenuous activity.      DO NOT DRIVE any vehicle or operate mechanical equipment for 24 hours following the end of your surgery.  Even though you may feel normal, your reactions may be affected by the medication you have received.      Do not drink alcoholic beverages for 24 hours following surgery.       Slowly progress to your regular diet as you feel able. It's not unusual to feel nauseated and/or vomit after receiving anesthesia.  If you develop these symptoms, drink clear liquids (apple juice, ginger ale, broth, 7-up, etc. ) until you feel better.  If your nausea and vomiting persists for 24 hours, please notify your surgeon.        All narcotic pain medications, along with inactivity and anesthesia, can cause constipation. Drinking plenty of liquids and increasing fiber intake will help.      For any questions of a medical nature, call your surgeon.      Do not make important decisions for 24 hours.      If you had general anesthesia, you may have a sore throat for a couple of days related to the breathing tube used during surgery.  You may use Cepacol lozenges to help with this discomfort.  If it worsens or if you develop a fever, contact your surgeon.       If you feel your pain is not well managed with the pain medications prescribed by your surgeon, please contact your surgeon's office to let them know so they can address your concerns.     Dacryocystorhinostomy Discharge Instructions   [facility]  LEANN LEUNG M.D.  [address]  [place], [place] [address]  [phone]  Things to avoid:    You should avoid blowing the nose forcefully or heavy sneezing while any tubing is in place to keep the tubes from coming loose or displacing.  The inside of the nose should not be disturbed if at all possible.  If you have any drying or discomfort  following your DCR on the inside of the nose, it is okay to apply a small amount of Vaseline gently to the area.  Additionally, saline nasal spray may be helpful. If you feel any stuffiness or congestion while any tubing is in place, we recommend nasal strips that can be bought at any drugstore and worn at night.    You will see a tiny (1/4 inch) loop of the silicone tubing extruding from the corner of your operated eye(s). Be careful not to disturb this area. If the silicone tubing does dislodge and extend into the eye, tape the tube using medical adhesive, to the side of the nose.  Call our office to set up an appointment to have the tube repositioned or removed.  The rubber tube inside your nose should fall out on its own in about 4-6 weeks. If it does not, call the office and the doctor can remove it in the office.  If the tube loosens or falls out sooner, it is okay because this tube was in place only to add extra bulk while the natural passage is forming and won t severely affect the overall outcome of the surgery.  Activity:  Please avoid heavy lifting or strenuous exercise for seven days after surgery. You may resume regular activities as tolerated. Air travel should also be avoided during this period of time. You may carefully shower on the second day after surgery.  At night, sleep with your head elevated on 2-3 pillows.  Using ice packs will help reduce bruising and swelling. Continue this until the swelling subsides.    Medication:  If the doctor has given you some medications to take after surgery, please take these according to the instructions on the bottle. Pain medications may make you drowsy so do not drive, operate heavy machinery, or use alcohol while taking pain medications.    If you were taking Coumadin (warfarin) prior to your surgery, you may resume this medication with your next scheduled dose.  Bleeding:  After a DCR, it is normal to have some bloody discharge from the nose that may empty  into the back of the throat. This will subside over the first one to two days after surgery. Please contact the doctor if there is excessive bleeding which does not respond to simple pressure on the nose.  Questions:  Please feel free to contact the office should any questions come up which are not answered above. The phone number is [phone].    [facility]   Eyelid/Orbital Surgery Discharge Instructions   Madhu Krause M.D..     ICE COMPRESSES  Immediately following surgery, you should begin to apply ice compresses.  Apply a cold gel pack or wrap a clean washcloth around a cup of crushed ice in a plastic bag (a bag of frozen peas also works well) and hold the cold compresses directly against the closed eyelid (s).Apply cold pack for a minimum of six times daily for no longer than 15 minutes at a time. Continue cold compresses every day until the bruising and swelling begin to subside.  This can vary for each patient, but three 3 days may be common.    HOT COMPRESSES  After your swelling and bruising have begun to subside, hot compresses should be applied.  Take a clean washcloth and wring it out in hot water (as warm as you can tolerate comfortably).  Hold this warm compress against the closed eyelid(s) at least six times per day for 15 minutes.  This should be continued for about two weeks.    OINTMENT  You may be given some ointment when you leave the hospital.  Apply this ointment to the suture line(s) twice a day, 1/4 inch into the eye at bedtime for 7 days.  Expect some blurring of vision from the ointment.     ACTIVITY  Avoid heavy lifting or vigorous exercise for one week after surgery.  You may resume regular activities as tolerated.  You may shower and wash your hair on the day after surgery; be careful to avoid getting shampoo in your eyes. While your eyes are still swelling, it is recommended you sleep on your back and elevate your head with 2-3 pillows.    MEDICATION  If the  doctor has given you some medications to take after surgery, please take these according to the instructions on the bottle.  Pain medications may make you drowsy so do not drive, operate heavy machinery, or use alcohol while taking it.  When you feel that you do not need the prescription pain medication, you may substitute Extra Strength Tylenol for mild pain by also following the directions on the bottle.    If you were taking Coumadin (warfarin) prior to your surgery, you may resume this medication with your next scheduled dose.    WHAT TO EXPECT  You should expect some slight oozing of blood from the incision site over the first two to three days after surgery.  Swelling and bruising will occur for one to two weeks or longer.  You may also experience itching and tearing during the first several weeks after surgery.  This is part of the normal healing process    QUESTIONS  Please feel free to contact the office, should you have any questions that are not answered above.  The phone number is [phone].  Please call immediately if you are unable to establish vision in the operative eye, you are experiencing heavy bleeding that will not stop with gentle pressure or you have any signs of an infection (greenish/yellow discharge or progressive redness).    Dr. Krause has prescribed Norco/Vicodin for pain for you. It's a combination medication of Tylenol 325 mg and Hydrocodone 5 mg.  There is a limit of 4000 mg of Tylenol per 24 hours.   You were given one of these pain pills at 2:30pm     You also were prescribed Erythromycin ointment.  Apply per pharmacy label instructions.    [facility]  [place] [address]  [place], [place] [address]  [phone]      Pending Results     No orders found from 10/14/2018 to 10/17/2018.            Admission Information     Date & Time Provider Department Dept. Phone    10/16/2018 Madhu Krause MD  "[facility] Same Day Surgery [phone]      Your Vitals Were     Blood Pressure Respirations Height Pulse Oximetry BMI (Body Mass Index)       160/80 16 1.803 m (5' 11\") 93% 37.55 kg/m2       Equal Access to Services     DOROTACHRISSY SANDHUGLENDA : Hadii aad omar hadandrewo Soomaali, waaxda luqadaha, qaybta kaalmada adeegyada, waxdmitriy idiin haymorgann adenafisa medrano lajuan danielmax . So [facility] [phone].    ATENCIÓN: Si habla español, tiene a khoury disposición servicios gratuitos de asistencia lingüística. Llame al [phone].    We comply with applicable federal civil rights laws and [place] laws. We do not discriminate on the basis of race, color, national origin, age, disability, sex, sexual orientation, or gender identity.               Review of your medicines      CONTINUE these medicines which have NOT CHANGED        Dose / Directions    amLODIPine 2.5 MG tablet   Commonly known as:  NORVASC   Used for:  Hypertension goal BP (blood pressure) < 140/90        Dose:  2.5 mg   Take 1 tablet (2.5 mg) by mouth daily   Quantity:  90 tablet   Refills:  1       diazepam 5 MG tablet   Commonly known as:  VALIUM   Used for:  Anxiety        1/2 to 1 po every 6 hours as needed for anxiety   Quantity:  10 tablet   Refills:  0       hydrochlorothiazide 25 MG tablet   Commonly known as:  HYDRODIURIL   Used for:  Hypertension goal BP (blood pressure) < 140/90        Dose:  25 mg   Take 1 tablet (25 mg) by mouth daily   Quantity:  90 tablet   Refills:  1       losartan 100 MG tablet   Commonly known as:  COZAAR   Used for:  Hypertension goal BP (blood pressure) < 140/90        Dose:  100 mg   Take 1 tablet (100 mg) by mouth daily   Quantity:  90 tablet   Refills:  1       LYRICA 50 MG capsule   Used for:  Neuropathy   Generic drug:  pregabalin        TAKE ONE CAPSULE BY MOUTH THREE TIMES DAILY   Quantity:  90 capsule   Refills:  5       metoprolol succinate 50 MG 24 hr tablet   Commonly known as:  TOPROL-XL   Used for:  Hypertension goal BP (blood " pressure) < 140/90        Dose:  50 mg   Take 1 tablet (50 mg) by mouth daily   Quantity:  90 tablet   Refills:  1       MULTIVITAMIN ADULT PO        Refills:  0       omeprazole 20 MG CR capsule   Commonly known as:  priLOSEC   Used for:  Gastroesophageal reflux disease without esophagitis        TAKE ONE CAPSULE BY MOUTH ONCE DAILY.  TAKE 30-60 MINUTES BEFORE A MEAL.   Quantity:  90 capsule   Refills:  3       tiZANidine 2 MG tablet   Commonly known as:  ZANAFLEX   Used for:  Muscle pain        Dose:  2 mg   Take 1 tablet (2 mg) by mouth 3 times daily as needed for muscle spasms   Quantity:  90 tablet   Refills:  1       vitamin D 2000 units Caps   Used for:  Vitamin D deficiency disease        1 po daily   Quantity:  90 capsule   Refills:  3                Protect others around you: Learn how to safely use, store and throw away your medicines at www.disposemymeds.org.             Medication List: This is a list of all your medications and when to take them. Check marks below indicate your daily home schedule. Keep this list as a reference.      Medications           Morning Afternoon Evening Bedtime As Needed    amLODIPine 2.5 MG tablet   Commonly known as:  NORVASC   Take 1 tablet (2.5 mg) by mouth daily                                diazepam 5 MG tablet   Commonly known as:  VALIUM   1/2 to 1 po every 6 hours as needed for anxiety                                hydrochlorothiazide 25 MG tablet   Commonly known as:  HYDRODIURIL   Take 1 tablet (25 mg) by mouth daily                                losartan 100 MG tablet   Commonly known as:  COZAAR   Take 1 tablet (100 mg) by mouth daily                                LYRICA 50 MG capsule   TAKE ONE CAPSULE BY MOUTH THREE TIMES DAILY   Generic drug:  pregabalin                                metoprolol succinate 50 MG 24 hr tablet   Commonly known as:  TOPROL-XL   Take 1 tablet (50 mg) by mouth daily                                MULTIVITAMIN ADULT PO                                 omeprazole 20 MG CR capsule   Commonly known as:  priLOSEC   TAKE ONE CAPSULE BY MOUTH ONCE DAILY.  TAKE 30-60 MINUTES BEFORE A MEAL.                                tiZANidine 2 MG tablet   Commonly known as:  ZANAFLEX   Take 1 tablet (2 mg) by mouth 3 times daily as needed for muscle spasms                                vitamin D 2000 units Caps   1 po daily

## 2018-10-16 NOTE — ANESTHESIA PREPROCEDURE EVALUATION
Procedure: Procedure(s):  REPAIR PTOSIS  REPAIR PTOSIS BROW  DACRYOCYSTORHINOSTOMY  Preop diagnosis: LEFT UPPER LID PTOSIS AND BROW PTOSIS WITH LEFT NASAL LACRIMAL DUCT OBSTRUCTION     Allergies   Allergen Reactions     Influenza Vac Typ Other (See Comments)     Delirium, fever     Past Medical History:   Diagnosis Date     Arthritis      Esophageal reflux 3/1/2014     Hearing problem      Hiatal hernia      Hypertension      Liver disease      Obesity 1/24/2013     Obstructive sleep apnea      Sleep apnea     Advised CPAP machine. Not keen to use it.      Past Surgical History:   Procedure Laterality Date     ARTHROSCOPY KNEE RT/LT      (L) with partial medial meniscectomy     C OPEN RX ANKLE DISLOCATN+FIXATN      (R)     C SPINAL FUSION,ANT,EA ADNL LEVEL      T12 - L1     CATARACT IOL, RT/LT  Nov and Dec 2017     ENDOSCOPIC ENDONASAL SURGERY  1994     ENDOSCOPY  2-19-15     EYE SURGERY       Hernia surgery Left 1994     NASAL/SINUS POLYPECTOMY       ROTATOR CUFF REPAIR RT/LT Right      Social History   Substance Use Topics     Smoking status: Former Smoker     Years: 5.00     Types: Cigarettes     Start date: 1/1/1990     Quit date: 1/1/1999     Smokeless tobacco: Never Used     Alcohol use No      Comment: quit in 1999     Prior to Admission medications    Medication Sig Start Date End Date Taking? Authorizing Provider   amLODIPine (NORVASC) 2.5 MG tablet Take 1 tablet (2.5 mg) by mouth daily 8/17/18   Richi Jaramillo MD   Cholecalciferol (VITAMIN D) 2000 UNITS CAPS 1 po daily 3/11/16   Richi Jaramillo MD   diazepam (VALIUM) 5 MG tablet 1/2 to 1 po every 6 hours as needed for anxiety 10/11/17   Richi Jaramillo MD   hydrochlorothiazide (HYDRODIURIL) 25 MG tablet Take 1 tablet (25 mg) by mouth daily 8/17/18   Richi Jaramillo MD   HYDROcodone-acetaminophen (NORCO) 5-325 MG per tablet Take 1 tablet by mouth every 6 hours as needed for moderate to severe pain 10/1/18   Richi Jaramillo MD   losartan (COZAAR)  100 MG tablet Take 1 tablet (100 mg) by mouth daily 8/17/18   Richi Jaramillo MD   LYRICA 50 MG capsule TAKE ONE CAPSULE BY MOUTH THREE TIMES DAILY 9/13/17   Richi Jaramillo MD   metoprolol succinate (TOPROL-XL) 50 MG 24 hr tablet Take 1 tablet (50 mg) by mouth daily 8/17/18   Richi Jaramillo MD   Multiple Vitamins-Minerals (MULTIVITAMIN ADULT PO)     Reported, Patient   omeprazole (PRILOSEC) 20 MG CR capsule TAKE ONE CAPSULE BY MOUTH ONCE DAILY.  TAKE 30-60 MINUTES BEFORE A MEAL. 3/7/18   Richi Jaramillo MD   tiZANidine (ZANAFLEX) 2 MG tablet Take 1 tablet (2 mg) by mouth 3 times daily as needed for muscle spasms 8/17/18   Richi Jaramillo MD     No current Epic-ordered facility-administered medications on file.      No current [facility]-ordered outpatient prescriptions on file.       Wt Readings from Last 1 Encounters:   10/01/18 122.1 kg (269 lb 4 oz)     Temp Readings from Last 1 Encounters:   10/01/18 36.3  C (97.3  F) (Oral)     BP Readings from Last 6 Encounters:   10/01/18 135/73   08/17/18 138/67   03/29/18 150/79   03/12/18 162/79   02/23/18 127/77   11/27/17 151/84     Pulse Readings from Last 4 Encounters:   10/01/18 75   08/17/18 62   03/29/18 98   03/12/18 70     Resp Readings from Last 1 Encounters:   11/27/17 20     SpO2 Readings from Last 1 Encounters:   10/01/18 96%     Recent Labs   Lab Test  10/01/18   0910  08/10/18   0924 02/23/18   0929   NA   --   142  138   POTASSIUM  4.1  4.2  3.7   CHLORIDE   --   108  102   CO2   --   24  29   ANIONGAP   --   10  7   GLC   --   178*  286*   BUN   --   16  15   CR  1.01  0.96  0.91   SOLEDAD   --   8.2*  8.6     Recent Labs   Lab Test  08/10/18   0924  02/23/18   0929  11/27/17   1730   AST  45  55*  73*   ALT  74*  84*  117*   ALKPHOS  70  73  81   BILITOTAL  1.1  1.8*  2.0*   LIPASE   --    --   220     Recent Labs   Lab Test  10/01/18   0910  08/10/18   0924   WBC  6.2  5.9   HGB  15.6  15.1   PLT  99*  82*     No results for input(s): ABO, RH in the  last 22937 hours.  Recent Labs   Lab Test  11/27/17   1730  06/24/15   0953   INR  1.05  1.05   PTT  27   --       No results for input(s): TROPI in the last 30115 hours.  No results for input(s): PH, PCO2, PO2, HCO3 in the last 02368 hours.  No results for input(s): HCG in the last 86494 hours.  No results found for this or any previous visit (from the past 744 hour(s)).    RECENT LABS:   ECG:   ECHO:     Anesthesia Evaluation     . Pt has had prior anesthetic.     No history of anesthetic complications          ROS/MED HX    ENT/Pulmonary:     (+)sleep apnea, SHONDA risk factors snores loudly, hypertension, obese, tobacco use, Past use doesn't use CPAP , . .    Neurologic:       Cardiovascular:     (+) Dyslipidemia, hypertension----. : . . . :. .       METS/Exercise Tolerance:     Hematologic:         Musculoskeletal:         GI/Hepatic:     (+) GERD Asymptomatic on medication, hiatal hernia, liver disease,       Renal/Genitourinary:         Endo:     (+) Obesity, .      Psychiatric:         Infectious Disease:         Malignancy:         Other:                     Physical Exam  Normal systems: pulmonary    Airway   Mallampati: II  Neck ROM: limited    Dental   (+) caps    Cardiovascular       Pulmonary (+) decreased breath sounds                       Anesthesia Plan      History & Physical Review  History and physical reviewed and following examination; no interval change.    ASA Status:  3 .    NPO Status:  > 8 hours    Plan for MAC Maintenance will be Balanced.  Reason for MAC:  Deep or markedly invasive procedure (G8)  PONV prophylaxis:  Ondansetron (or other 5HT-3)       Postoperative Care  Postoperative pain management:  IV analgesics.      Consents  Anesthetic plan, risks, benefits and alternatives discussed with:  Patient..                          .

## 2018-10-23 ENCOUNTER — TELEPHONE (OUTPATIENT)
Dept: FAMILY MEDICINE | Facility: CLINIC | Age: 68
End: 2018-10-23

## 2018-10-23 DIAGNOSIS — L98.9 GENERALIZED SKIN LESIONS: Primary | ICD-10-CM

## 2018-10-23 NOTE — TELEPHONE ENCOUNTER
Reason for Call:  Other call back    Detailed comments:  Patient coming and wanting you to get him another referral for a different dermatologist.  Also wants you to call Dr. Recio @ [phone] to see want he want to do so can give this information to the new dermatologist.  Any questions please call patient.    Phone Number Patient can be reached at: Home number on file [phone] (home)    Best Time:  Anytime     Can we leave a detailed message on this number? YES    Call taken on 10/23/2018 at 12:01 PM by Natalia Sainz

## 2018-10-23 NOTE — TELEPHONE ENCOUNTER
I did another dermatology referral with 3 options.  Please give him the numbers to call.  Patient will need to call [facility] himself to get records. I cannot do that as I am not on staff at that practice.    Please inform patient.    Richi Jaramillo MD

## 2018-10-24 ENCOUNTER — TELEPHONE (OUTPATIENT)
Dept: FAMILY MEDICINE | Facility: CLINIC | Age: 68
End: 2018-10-24

## 2018-10-24 DIAGNOSIS — M79.10 MUSCLE PAIN: ICD-10-CM

## 2018-10-24 DIAGNOSIS — J06.9 UPPER RESPIRATORY TRACT INFECTION, UNSPECIFIED TYPE: Primary | ICD-10-CM

## 2018-10-24 NOTE — TELEPHONE ENCOUNTER
Patient feels like he may have a cold coming on would like refill. He would like a call back to discuss, states he will be by his phone now.

## 2018-10-24 NOTE — TELEPHONE ENCOUNTER
Reason for Call:  Medication or medication refill:    Do you use a [facility]?  No    Name of the medication requested: cephalexin    Other request: Please send to either [facility] [phone] or [facility] [phone]. Please advise.     Can we leave a detailed message on this number? NO    Phone number patient can be reached at: Home number on file [phone] (home)    Best Time: Anytime    Call taken on 10/24/2018 at 3:53 PM by Trudy Bonilla

## 2018-10-24 NOTE — TELEPHONE ENCOUNTER
Tried calling patient but his VM box is not setup   Will need to try back.  Cephalexin Dispensed once in July 2017.  Why does he need refill?  Rufina Silver RN

## 2018-10-24 NOTE — TELEPHONE ENCOUNTER
Attempt # 2  Called patient at home number.[phone]   Was call answered?  Voice mail box not set up. Will have TC send letter with referral information.      Tika Mistry RN  [facility]

## 2018-10-25 RX ORDER — CEPHALEXIN 500 MG/1
500 CAPSULE ORAL 3 TIMES DAILY
Qty: 21 CAPSULE | Refills: 0 | Status: SHIPPED | OUTPATIENT
Start: 2018-10-25 | End: 2019-02-05

## 2018-10-25 RX ORDER — TIZANIDINE 2 MG/1
2 TABLET ORAL 3 TIMES DAILY PRN
Qty: 90 TABLET | Refills: 1 | Status: SHIPPED | OUTPATIENT
Start: 2018-10-25 | End: 2019-02-05

## 2018-10-25 NOTE — TELEPHONE ENCOUNTER
Attempt # 1  Called patient at home number.  Was call answered?  Yes, had eye surgery, tear duct replacement, fixed lid, tubes still in there need to stay in there for 3 to 6 months, was given cephalexin 500 mg tid X 7 days finished yesterday. Now feels like has a cold coming on or something.  Has appointment 11/13 with dermatologist at [facility] to continue with Dr. Shepherd diagnosis.     Routing to PCP to prescribe ABX    Patient requesting an e-mail from PCP.      Tika Mistry RN  [facility]

## 2018-10-25 NOTE — TELEPHONE ENCOUNTER
Okayed another week of cephalexin and refilled tizanidine.    Please inform patient.    Richi Jaramillo MD

## 2018-10-25 NOTE — TELEPHONE ENCOUNTER
Attempt # 1  Called patient at home number.  Was call answered?  Yes, relayed below information. Patient verbalized understanding.    Tika Mistry RN  [facility]

## 2018-11-09 ENCOUNTER — OFFICE VISIT (OUTPATIENT)
Dept: FAMILY MEDICINE | Facility: CLINIC | Age: 68
End: 2018-11-09
Payer: COMMERCIAL

## 2018-11-09 VITALS
HEART RATE: 92 BPM | TEMPERATURE: 97.1 F | WEIGHT: 274 LBS | SYSTOLIC BLOOD PRESSURE: 136 MMHG | OXYGEN SATURATION: 93 % | DIASTOLIC BLOOD PRESSURE: 72 MMHG | BODY MASS INDEX: 38.22 KG/M2

## 2018-11-09 DIAGNOSIS — M25.512 LEFT SHOULDER PAIN, UNSPECIFIED CHRONICITY: ICD-10-CM

## 2018-11-09 DIAGNOSIS — M25.561 RIGHT KNEE PAIN, UNSPECIFIED CHRONICITY: ICD-10-CM

## 2018-11-09 DIAGNOSIS — Z91.09 ENVIRONMENTAL ALLERGIES: ICD-10-CM

## 2018-11-09 DIAGNOSIS — R60.0 BILATERAL LOWER EXTREMITY EDEMA: ICD-10-CM

## 2018-11-09 DIAGNOSIS — J34.89 SINUS DRAINAGE: ICD-10-CM

## 2018-11-09 DIAGNOSIS — E66.9 OBESITY, UNSPECIFIED OBESITY SEVERITY, UNSPECIFIED OBESITY TYPE: ICD-10-CM

## 2018-11-09 DIAGNOSIS — M21.70 LEG LENGTH DISCREPANCY: Primary | ICD-10-CM

## 2018-11-09 PROBLEM — E66.01 MORBID OBESITY (H): Status: ACTIVE | Noted: 2018-11-09

## 2018-11-09 PROBLEM — E11.9 DIABETES MELLITUS, TYPE 2 (H): Status: ACTIVE | Noted: 2018-11-09

## 2018-11-09 PROCEDURE — 99214 OFFICE O/P EST MOD 30 MIN: CPT | Performed by: FAMILY MEDICINE

## 2018-11-09 RX ORDER — HYDROCODONE BITARTRATE AND ACETAMINOPHEN 5; 325 MG/1; MG/1
1 TABLET ORAL EVERY 6 HOURS PRN
Qty: 24 TABLET | Refills: 0 | Status: SHIPPED | OUTPATIENT
Start: 2018-11-09 | End: 2019-02-05

## 2018-11-09 NOTE — PROGRESS NOTES
SUBJECTIVE:   Gucci Logan is a 68 year old male who presents to clinic today for the following health issues:      Patient is here for a cold after he had his surgery, still getting green mucus.  Follow up on right leg also.         Problem list and histories reviewed & adjusted, as indicated.  Additional history: as documented         Reviewed and updated as needed this visit by clinical staff  Tobacco  Allergies  Meds  Med Hx  Surg Hx  Fam Hx  Soc Hx      Reviewed and updated as needed this visit by Provider         Cold sweats    Had several courses of antibiotics    Lots of sweats at night    Green drainage  From nose    Also congested in head    Had the lid and tear duct and brow procedure done    Lots of leg swelling yet    Some pains    Physical Exam   Constitutional: He is oriented to person, place, and time and well-developed, well-nourished, and in no distress. No distress.   HENT:   Head: Normocephalic and atraumatic.   Right Ear: Tympanic membrane, external ear and ear canal normal.   Left Ear: Tympanic membrane, external ear and ear canal normal.   Nose: Nose normal.   Mouth/Throat: Oropharynx is clear and moist.   Small blue tube visible left nasal passage ( from recent procedure)  Nasal mucosa okay  Oral mucosa a little dry   Eyes: Conjunctivae are normal.   Neck: Neck supple. Carotid bruit is not present.   Cardiovascular: Normal rate, regular rhythm and intact distal pulses.  Exam reveals no gallop and no friction rub.    Murmur (slight) heard.  Pulmonary/Chest: Effort normal and breath sounds normal. No respiratory distress. He has no wheezes. He has no rales.   Musculoskeletal: He exhibits no edema.   Lymphadenopathy:     He has no cervical adenopathy.   Neurological: He is alert and oriented to person, place, and time.   Skin: He is not diaphoretic.   Psychiatric: Mood and affect normal.     Right leg does appear about 1/2 inch shorter than right       ASSESSMENT / PLAN:  (M21.70)  Leg length discrepancy  (primary encounter diagnosis)  Comment: discussed in detail with patient.  This has been present for almost 50 years, since tibia surgery.  Plan: advised he do a trial of a few weeks with a lift shoe on right     (J34.89) Sinus drainage  Comment: discussed in detail.  Advised against continuing more courses of antibiotics   Plan: monitor symptoms     (Z91.09) Environmental allergies  Comment: may be allergy component present   Plan: take daily loratadine or fexofenadine ( has used in past )    (M25.512) Left shoulder pain, unspecified chronicity  Comment: needs refill   Plan: HYDROcodone-acetaminophen (NORCO) 5-325 MG per         tablet        Just uses sparingly     (M25.561) Right knee pain, unspecified chronicity  Comment: as above   Plan: HYDROcodone-acetaminophen (NORCO) 5-325 MG per         tablet             (E66.9) Obesity, unspecified obesity severity, unspecified obesity type  Comment: chronic but overall wt has come down some in last few years   Plan: keep working on healthy diet/exercise and wt loss    (R60.0) Bilateral lower extremity edema  Comment: not worrisome at all here   Plan: monitor.  No need for additional water pill      I reviewed the patient's medications, allergies, medical history, family history, and social history.    Richi Jaramillo MD

## 2018-11-09 NOTE — MR AVS SNAPSHOT
"              After Visit Summary   2018    Gucci Logan    MRN: [id number]           Patient Information     Date Of Birth          1950        Visit Information        Provider Department      2018 11:40 AM Richi Jaramillo MD [facility]        Care Instructions    Advise getting a pair of shoes with right one having thick soles, 1/2 inch lift    Check temperature    Use daily claritin or allegra    Could use benadryl at night    Continue regular walking                   Follow-ups after your visit        Who to contact     If you have questions or need follow up information about today's clinic visit or your schedule please contact [facility] directly at [phone].  Normal or non-critical lab and imaging results will be communicated to you by MyChart, letter or phone within 4 business days after the clinic has received the results. If you do not hear from us within 7 days, please contact the clinic through MyChart or phone. If you have a critical or abnormal lab result, we will notify you by phone as soon as possible.  Submit refill requests through [employer] or call your pharmacy and they will forward the refill request to us. Please allow 3 business days for your refill to be completed.          Additional Information About Your Visit        MyChart Information     [employer] lets you send messages to your doctor, view your test results, renew your prescriptions, schedule appointments and more. To sign up, go to [email or web address] . Click on \"Log in\" on the left side of the screen, which will take you to the Welcome page. Then click on \"Sign up Now\" on the right side of the page.     You will be asked to enter the access code listed below, as well as some personal information. Please follow the directions to create your username and password.     Your access code is: [email or web address]  Expires: 2019 12:24 PM     Your access code will  " in 90 days. If you need help or a new code, please call your [place] clinic or [phone].        Your Vitals Were     Pulse Temperature Pulse Oximetry BMI (Body Mass Index)          92 97.1  F (36.2  C) (Oral) 93% 38.22 kg/m2         Blood Pressure from Last 3 Encounters:   11/09/18 136/72   10/16/18 164/74   10/01/18 135/73    Weight from Last 3 Encounters:   11/09/18 274 lb (124.3 kg)   10/01/18 269 lb 4 oz (122.1 kg)   08/17/18 270 lb (122.5 kg)              Today, you had the following     No orders found for display       Primary Care Provider Office Phone # Fax #    Richi Jaramillo [phone]       [address] [facility] [address]        Equal Access to Services     ARTHUR FERNANDEZ : Hadii aad ku hadasho Soomaali, waaxda luqadaha, qaybta kaalmada adenafisayada, shannan herrera . So [facility] [phone].    ATENCIÓN: Si habla español, tiene a khoury disposición servicios gratuitos de asistencia lingüística. Llame al [phone].    We comply with applicable federal civil rights laws and [place] laws. We do not discriminate on the basis of race, color, national origin, age, disability, sex, sexual orientation, or gender identity.            Thank you!     Thank you for choosing [facility]  for your care. Our goal is always to provide you with excellent care. Hearing back from our patients is one way we can continue to improve our services. Please take a few minutes to complete the written survey that you may receive in the mail after your visit with us. Thank you!             Your Updated Medication List - Protect others around you: Learn how to safely use, store and throw away your medicines at www.disposemymeds.org.          This list is accurate as of 11/9/18 12:24 PM.  Always use your most recent med list.                   Brand Name Dispense Instructions for use Diagnosis    amLODIPine 2.5 MG tablet    NORVASC    90 tablet    Take 1  tablet (2.5 mg) by mouth daily    Hypertension goal BP (blood pressure) < 140/90       CEPHALEXIN PO      Take 500 mg by mouth        diazepam 5 MG tablet    VALIUM    10 tablet    1/2 to 1 po every 6 hours as needed for anxiety    Anxiety       hydrochlorothiazide 25 MG tablet    HYDRODIURIL    90 tablet    Take 1 tablet (25 mg) by mouth daily    Hypertension goal BP (blood pressure) < 140/90       losartan 100 MG tablet    COZAAR    90 tablet    Take 1 tablet (100 mg) by mouth daily    Hypertension goal BP (blood pressure) < 140/90       LYRICA 50 MG capsule   Generic drug:  pregabalin     90 capsule    TAKE ONE CAPSULE BY MOUTH THREE TIMES DAILY    Neuropathy       metoprolol succinate 50 MG 24 hr tablet    TOPROL-XL    90 tablet    Take 1 tablet (50 mg) by mouth daily    Hypertension goal BP (blood pressure) < 140/90       MULTIVITAMIN ADULT PO           omeprazole 20 MG CR capsule    priLOSEC    90 capsule    TAKE ONE CAPSULE BY MOUTH ONCE DAILY.  TAKE 30-60 MINUTES BEFORE A MEAL.    Gastroesophageal reflux disease without esophagitis       tiZANidine 2 MG tablet    ZANAFLEX    90 tablet    Take 1 tablet (2 mg) by mouth 3 times daily as needed for muscle spasms    Muscle pain       vitamin D 2000 units Caps     90 capsule    1 po daily    Vitamin D deficiency disease

## 2018-12-21 ENCOUNTER — TELEPHONE (OUTPATIENT)
Dept: FAMILY MEDICINE | Facility: CLINIC | Age: 68
End: 2018-12-21

## 2018-12-21 DIAGNOSIS — J01.90 ACUTE SINUSITIS, RECURRENCE NOT SPECIFIED, UNSPECIFIED LOCATION: Primary | ICD-10-CM

## 2018-12-21 RX ORDER — AZITHROMYCIN 250 MG/1
TABLET, FILM COATED ORAL
Qty: 6 TABLET | Refills: 1 | Status: SHIPPED | OUTPATIENT
Start: 2018-12-21 | End: 2019-01-10

## 2018-12-21 NOTE — TELEPHONE ENCOUNTER
Routing to PCP to review and advise.    Pharmacy cued.    Tika Mistry RN  [facility]

## 2018-12-21 NOTE — TELEPHONE ENCOUNTER
Reason for call:  Patient reporting a symptom    Symptom or request: Patient has a reoccurent sinus infection. He has a head cold, sinus pressure, back of the throat and chest film. Patient is hoping for an antibiotic so that he can enjoy Ludmila. He also wants to wish PCP a Merry Ludmila to him and his family.     Duration (how long have symptoms been present): ongoing since last appointment    Have you been treated for this before? Yes    Additional comments: Patient uses [facility]     Phone Number patient can be reached at:  Home number on file [phone] (home)    Best Time:  any    Can we leave a detailed message on this number:  YES    Call taken on 12/21/2018 at 9:17 AM by Sarah Eckert

## 2018-12-21 NOTE — TELEPHONE ENCOUNTER
Patient called back and was given below information.  He will come in after the Holiday's for an appointment.  Rufina Silver RN

## 2018-12-21 NOTE — TELEPHONE ENCOUNTER
Tried calling patient but received VM and his mailbox not setup.  Will need to try back.  Rufina Silver RN

## 2018-12-26 ENCOUNTER — TELEPHONE (OUTPATIENT)
Dept: FAMILY MEDICINE | Facility: CLINIC | Age: 68
End: 2018-12-26

## 2018-12-26 DIAGNOSIS — J20.9 ACUTE BRONCHITIS, UNSPECIFIED ORGANISM: Primary | ICD-10-CM

## 2018-12-26 DIAGNOSIS — R60.0 BILATERAL LOWER EXTREMITY EDEMA: Primary | ICD-10-CM

## 2018-12-26 NOTE — TELEPHONE ENCOUNTER
I printed prescription for stockings.  Also sent in prescription for 2nd line antibiotic.    Please inform patient    Richi Jaramillo MD

## 2018-12-26 NOTE — TELEPHONE ENCOUNTER
Reason for Call:  Medication or medication refill:    Do you use a [facility]?  Name of the pharmacy and phone number for the current request:  [facility]    Name of the medication requested: Mediven compression stockings     Other request: Patient states that he would like Dr. Jaramillo to write a script for these stockings.  His neighbor had a pair and patient tried them on and he likes it.  A script needs to be written and then patient will go in and have his leg/calf measured.   Please call patient if any questions regarding his request.    Can we leave a detailed message on this number? YES    Phone number patient can be reached at: Home number on file [phone] (home)    Best Time: anytime    Call taken on 12/26/2018 at 1:37 PM by Nadine Velasco

## 2018-12-26 NOTE — TELEPHONE ENCOUNTER
Reason for Call:  Medication or medication refill:        Do you use a [facility]?  Name of the pharmacy and phone number for the current request:  [facility]    Name of the medication requested: Zpack for his nose.      Other request: Patient is requesting something a bit stronger.  Patient states that the Zpack is just barely making it for him.      Can we leave a detailed message on this number? YES    Phone number patient can be reached at: Home number on file [phone] (home)    Best Time: anytime    Call taken on 12/26/2018 at 1:35 PM by Nadine Velasco

## 2019-01-10 ENCOUNTER — OFFICE VISIT (OUTPATIENT)
Dept: FAMILY MEDICINE | Facility: CLINIC | Age: 69
End: 2019-01-10
Payer: COMMERCIAL

## 2019-01-10 VITALS
BODY MASS INDEX: 38.67 KG/M2 | TEMPERATURE: 97.5 F | WEIGHT: 276.25 LBS | OXYGEN SATURATION: 95 % | DIASTOLIC BLOOD PRESSURE: 78 MMHG | HEIGHT: 71 IN | HEART RATE: 75 BPM | SYSTOLIC BLOOD PRESSURE: 156 MMHG

## 2019-01-10 DIAGNOSIS — R10.84 ABDOMINAL PAIN, GENERALIZED: ICD-10-CM

## 2019-01-10 DIAGNOSIS — J34.89 NASAL DRAINAGE: ICD-10-CM

## 2019-01-10 DIAGNOSIS — I85.00 ESOPHAGEAL VARICES WITHOUT BLEEDING, UNSPECIFIED ESOPHAGEAL VARICES TYPE (H): ICD-10-CM

## 2019-01-10 DIAGNOSIS — Z12.11 SCREEN FOR COLON CANCER: ICD-10-CM

## 2019-01-10 DIAGNOSIS — I10 HYPERTENSION GOAL BP (BLOOD PRESSURE) < 140/90: ICD-10-CM

## 2019-01-10 DIAGNOSIS — R07.9 CHEST PAIN, UNSPECIFIED TYPE: ICD-10-CM

## 2019-01-10 DIAGNOSIS — Z86.0100 HISTORY OF COLONIC POLYPS: Primary | ICD-10-CM

## 2019-01-10 DIAGNOSIS — E66.01 MORBID OBESITY (H): ICD-10-CM

## 2019-01-10 PROCEDURE — 99214 OFFICE O/P EST MOD 30 MIN: CPT | Performed by: FAMILY MEDICINE

## 2019-01-10 RX ORDER — HYDROCHLOROTHIAZIDE 50 MG/1
50 TABLET ORAL DAILY
Qty: 90 TABLET | Refills: 1 | Status: SHIPPED | OUTPATIENT
Start: 2019-01-10 | End: 2019-02-05

## 2019-01-10 ASSESSMENT — PAIN SCALES - GENERAL: PAINLEVEL: NO PAIN (0)

## 2019-01-10 ASSESSMENT — MIFFLIN-ST. JEOR: SCORE: 2050.19

## 2019-01-10 NOTE — PATIENT INSTRUCTIONS
We will try to schedule heart test    Use compression stockings    Increase hydrochlorothiazide to 50 mg daily    Other meds as is    Call to schedule scope exams

## 2019-01-10 NOTE — PROGRESS NOTES
"  SUBJECTIVE:   Gucci Logan is a 68 year old male who presents to clinic today for the following health issues:       Follow up after eye surgery  Discuss stomach issues  Possible infection for the past 2 months since having the eye surgery    none    Problem list and histories reviewed & adjusted, as indicated.  Additional history: as documented         Reviewed and updated as needed this visit by clinical staff       Reviewed and updated as needed this visit by Provider          3 million steps in less than a year    [facility] right   Ankle is \"screwed\"  Knee has spur on it      Knee overall worse  Patient not wanting knee replacement    Had eye procedure    Left lid drooping again    Tear duct is healing up    Big tube is out  Small tube they put in is there    Night sweats    Ongoing nasal issues    Voice not great    Had skin lesion frozen    Wondering about his heart    Tender over liver    Went through treatment for hep c about 7 years ago    Legs swelling    Sold condo    Living in one of his buildings    Wondering about tenants possibly doing drugs?    Back is variable    Sometimes has to sleep on couch    Only 4-5 hours of sleep    Bowels and bladder okay      Physical Exam   Constitutional: He is oriented to person, place, and time. He appears well-developed and well-nourished.   HENT:   Head: Normocephalic and atraumatic.   Eyes: Conjunctivae are normal.   Neck: Carotid bruit is not present.   Cardiovascular: Normal rate, regular rhythm, normal heart sounds and intact distal pulses.   Pulmonary/Chest: Effort normal and breath sounds normal. No respiratory distress.   Abdominal: Soft. There is tenderness (some tenderness ruq but more tender l side).   Musculoskeletal: He exhibits no edema.   Neurological: He is alert and oriented to person, place, and time. No cranial nerve deficit.   Psychiatric: He has a normal mood and affect. His speech is normal and behavior is normal. "     A bit of watering of left eye     .ASSESSMENT / PLAN:  (Z86.010) History of colonic polyps  (primary encounter diagnosis)  Comment: patient due for both egd and colonoscopy   Plan: GASTROENTEROLOGY ADULT REF PROCEDURE ONLY         Other; [facility] [phone]        Ordered     (Z12.11) Screen for colon cancer  Comment: as above   Plan: GASTROENTEROLOGY ADULT REF PROCEDURE ONLY         Other; [facility] [phone]        Patient will call to schedule     (R10.84) Abdominal pain, generalized  Comment: unclear etiology   Plan: GASTROENTEROLOGY ADULT REF PROCEDURE ONLY         Other; [facility] [phone]        Get scope exams then proceed appropriately    If symptoms acutely worsen, be seen promptly     (I85.00) Esophageal varices without bleeding, unspecified esophageal varices type (H)  Comment: as above   Plan: GASTROENTEROLOGY ADULT REF PROCEDURE ONLY         Other; [facility] [phone]             (I10) Hypertension goal BP (blood pressure) < 140/90  Comment: blood pressure high.  On several meds.   Plan: hydrochlorothiazide (HYDRODIURIL) 50 MG tablet        Increase hydrochlorothiazide     (R07.9) Chest pain, unspecified type  Comment: patient concerned about heart.  No recent stress test.  He does not think he could do exercise stress test.   Plan: [place] Lexiscan stress test             (J34.89) Nasal drainage  Comment: since the eye procedure.  Plan: monitor.  Hold off on antibiotics.     (E66.01) Obesity (BMI 35.0-39.9) with comorbidity (H)  Comment: chronic   Plan: keep working on healthy diet/exercise and wt loss as able       I reviewed the patient's medications, allergies, medical history, family history, and social history.    Richi Jaramillo MD

## 2019-01-21 ENCOUNTER — TELEPHONE (OUTPATIENT)
Dept: FAMILY MEDICINE | Facility: CLINIC | Age: 69
End: 2019-01-21

## 2019-01-21 DIAGNOSIS — J01.90 ACUTE SINUSITIS, RECURRENCE NOT SPECIFIED, UNSPECIFIED LOCATION: Primary | ICD-10-CM

## 2019-01-21 RX ORDER — DOXYCYCLINE 100 MG/1
100 CAPSULE ORAL 2 TIMES DAILY
Qty: 20 CAPSULE | Refills: 0 | Status: SHIPPED | OUTPATIENT
Start: 2019-01-21 | End: 2019-02-05

## 2019-01-21 RX ORDER — AZITHROMYCIN 250 MG/1
TABLET, FILM COATED ORAL
Qty: 6 TABLET | Refills: 1 | Status: SHIPPED | OUTPATIENT
Start: 2019-01-21 | End: 2019-01-21

## 2019-01-21 NOTE — TELEPHONE ENCOUNTER
Reason for Call:  Other     Detailed comments: Patient states that perhaps we should consider an antibiotic as for the last 4-5 days in the house his lungs and chest are so sore with coughing.  He also states that his kidneys and his knees are giving him pain.  Please call patient back to discuss.    Phone Number Patient can be reached at: Cell number on file:    Telephone Information:   Mobile [phone]       Best Time: anytime    Can we leave a detailed message on this number? YES    Call taken on 1/21/2019 at 1:53 PM by Nadine Velasco

## 2019-01-21 NOTE — TELEPHONE ENCOUNTER
Attempt # 1  Called patient at home [phone]  Was call answered? Yes, eye watering the eye surgeon gave steroid ABX for eye, Started Thursday started feeling sick, had an appointment for stress test which patient cancelled due to being ill, wake up wringing wet, chest sore thinks from coughing, sinus hurts, is squinting a lot from the pain, has plates and bolts in back, right hip is hurting, right knee/ankle/hip hurts. Has a couple diazepam left is wondering if this would help.  Greenish/yellow/white mucous from nose mostly green,   Requesting a pain medication rates pain 6.5/10 while sitting. States is running about a 100 degree temperature.  When lays down at night has some wheezing. Wakes in morning with ribs and below ribs sore.    Routing to PCP    Requesting ABX and pain medication????    Pharmacy cued.    Tika Mistry RN  [facility]

## 2019-01-22 NOTE — TELEPHONE ENCOUNTER
I sent in alternative antibiotic, doxycycline.    See us in next few weeks for the leg symptoms.    Please inform patient    Richi Jaramillo MD

## 2019-01-22 NOTE — TELEPHONE ENCOUNTER
I sent in antibiotic prescription    Use tylenol as needed for pain    See us in clinic if symptoms not resolving    To emergency room if symptoms get worse    Please inform patient    Richi Jaramillo MD

## 2019-01-22 NOTE — TELEPHONE ENCOUNTER
I called patient back, he starts off by telling me to make a note to not call him so early in the AM.   He also reminds us that he has no voicemail.  Eventually discussed the issue at hand.   Scheduled for next week per patient preference and he says he will bring any updates from [facility] with him as well.   Apparently is also followed by them.    Patient verbalized understanding of and agreement with plan.    Connie Garza RN  [facility]

## 2019-01-22 NOTE — TELEPHONE ENCOUNTER
Attempt # 1  Called patient at home [phone]  Was call answered?  No answer, voice mail box not set up    Tika Mistry RN  [facility]

## 2019-01-22 NOTE — TELEPHONE ENCOUNTER
"To PCP:  Patient states azithromycin doesn't really work for patient any longer.  Also states Augmentin and Amoxicillin doesn't work either.  He really is hoping for something different.    Also, patient states he is having lots of \"pulsing sensation\" in his right leg on the outside from hip to ankle.  This is fairly new. Denies any coloration changes or swelling, etc.  His is asking if he should see Vein specialist or see you?  Please advise.  Thank you.  Rufina Silver RN      "

## 2019-01-30 ENCOUNTER — TELEPHONE (OUTPATIENT)
Dept: FAMILY MEDICINE | Facility: CLINIC | Age: 69
End: 2019-01-30

## 2019-01-30 DIAGNOSIS — M54.9 BACK PAIN, UNSPECIFIED BACK LOCATION, UNSPECIFIED BACK PAIN LATERALITY, UNSPECIFIED CHRONICITY: Primary | ICD-10-CM

## 2019-01-30 DIAGNOSIS — R52 TOTAL BODY PAIN: ICD-10-CM

## 2019-01-30 NOTE — TELEPHONE ENCOUNTER
Reason for Call:  Other     Detailed comments: Patient came into clinic scheduled an appointment for 2/5/19. Patient was supposed to follow up this week, but wants to wait until next week to come in to see Dr. Jaramillo. Wanted a note sent to Dr. Jaramillo that he still wasn't feeling the best. Call as needed, otherwise patient will be at appointment next week.    Phone Number Patient can be reached at: Home number on file [phone] (home)    Best Time: any    Can we leave a detailed message on this number? Yes    Call taken on 1/30/2019 at 3:26 PM by Megan Ocampo

## 2019-02-05 ENCOUNTER — OFFICE VISIT (OUTPATIENT)
Dept: FAMILY MEDICINE | Facility: CLINIC | Age: 69
End: 2019-02-05
Payer: COMMERCIAL

## 2019-02-05 VITALS
TEMPERATURE: 98.3 F | SYSTOLIC BLOOD PRESSURE: 135 MMHG | OXYGEN SATURATION: 95 % | BODY MASS INDEX: 38.56 KG/M2 | WEIGHT: 276.5 LBS | DIASTOLIC BLOOD PRESSURE: 68 MMHG | HEART RATE: 87 BPM

## 2019-02-05 DIAGNOSIS — I10 HYPERTENSION GOAL BP (BLOOD PRESSURE) < 140/90: Primary | ICD-10-CM

## 2019-02-05 DIAGNOSIS — K21.9 GASTROESOPHAGEAL REFLUX DISEASE, ESOPHAGITIS PRESENCE NOT SPECIFIED: ICD-10-CM

## 2019-02-05 DIAGNOSIS — Z98.1 HISTORY OF LUMBAR FUSION: ICD-10-CM

## 2019-02-05 DIAGNOSIS — E66.01 MORBID OBESITY (H): ICD-10-CM

## 2019-02-05 DIAGNOSIS — K21.9 GASTROESOPHAGEAL REFLUX DISEASE WITHOUT ESOPHAGITIS: ICD-10-CM

## 2019-02-05 DIAGNOSIS — M25.561 RIGHT KNEE PAIN, UNSPECIFIED CHRONICITY: ICD-10-CM

## 2019-02-05 DIAGNOSIS — M79.10 MUSCLE PAIN: ICD-10-CM

## 2019-02-05 DIAGNOSIS — M25.571 RIGHT ANKLE PAIN, UNSPECIFIED CHRONICITY: ICD-10-CM

## 2019-02-05 DIAGNOSIS — M25.511 RIGHT SHOULDER PAIN, UNSPECIFIED CHRONICITY: ICD-10-CM

## 2019-02-05 DIAGNOSIS — F41.9 ANXIETY: ICD-10-CM

## 2019-02-05 PROCEDURE — 99214 OFFICE O/P EST MOD 30 MIN: CPT | Performed by: FAMILY MEDICINE

## 2019-02-05 RX ORDER — LOSARTAN POTASSIUM 100 MG/1
100 TABLET ORAL DAILY
Qty: 90 TABLET | Refills: 1 | Status: SHIPPED | OUTPATIENT
Start: 2019-02-05 | End: 2019-02-05

## 2019-02-05 RX ORDER — LOSARTAN POTASSIUM 100 MG/1
100 TABLET ORAL DAILY
Qty: 90 TABLET | Refills: 1 | Status: SHIPPED | OUTPATIENT
Start: 2019-02-05 | End: 2019-10-12

## 2019-02-05 RX ORDER — METOPROLOL SUCCINATE 50 MG/1
50 TABLET, EXTENDED RELEASE ORAL DAILY
Qty: 90 TABLET | Refills: 1 | Status: SHIPPED | OUTPATIENT
Start: 2019-02-05 | End: 2019-10-10

## 2019-02-05 RX ORDER — HYDROCHLOROTHIAZIDE 25 MG/1
25 TABLET ORAL DAILY
Qty: 90 TABLET | Refills: 1 | Status: SHIPPED | OUTPATIENT
Start: 2019-02-05 | End: 2019-09-30

## 2019-02-05 RX ORDER — METHOCARBAMOL 500 MG/1
500 TABLET, FILM COATED ORAL 3 TIMES DAILY PRN
Qty: 30 TABLET | Refills: 0 | Status: SHIPPED | OUTPATIENT
Start: 2019-02-05 | End: 2019-03-11

## 2019-02-05 RX ORDER — HYDROCHLOROTHIAZIDE 25 MG/1
25 TABLET ORAL DAILY
Qty: 90 TABLET | Refills: 1 | Status: SHIPPED | OUTPATIENT
Start: 2019-02-05 | End: 2019-02-05

## 2019-02-05 RX ORDER — HYDROCODONE BITARTRATE AND ACETAMINOPHEN 5; 325 MG/1; MG/1
1 TABLET ORAL EVERY 6 HOURS PRN
Qty: 24 TABLET | Refills: 0 | Status: SHIPPED | OUTPATIENT
Start: 2019-02-05 | End: 2019-03-29

## 2019-02-05 RX ORDER — AMLODIPINE BESYLATE 2.5 MG/1
2.5 TABLET ORAL DAILY
Qty: 90 TABLET | Refills: 1 | Status: SHIPPED | OUTPATIENT
Start: 2019-02-05 | End: 2019-03-29

## 2019-02-05 ASSESSMENT — PAIN SCALES - GENERAL: PAINLEVEL: NO PAIN (0)

## 2019-02-05 NOTE — PATIENT INSTRUCTIONS
Stay on 25 mg hydrochlorothiazide    Other blood pressure meds as is    New muscle relaxer is methocarbamol ( robaxin), watch for sedation    Increase exercise gradually; if feeling good, continue.  If not, call and we can schedule testing    Be seen promptly if symptoms acutely worsen     Call [facility]

## 2019-02-05 NOTE — PROGRESS NOTES
SUBJECTIVE:   Gucci Logan is a 68 year old male who presents to clinic today for the following health issues:       recheck    none    Problem list and histories reviewed & adjusted, as indicated.  Additional history: as documented         Reviewed and updated as needed this visit by clinical staff  Tobacco  Allergies  Meds  Med Hx  Surg Hx  Fam Hx  Soc Hx      Reviewed and updated as needed this visit by Provider          occasional wheezing    Finished antibiotics    Not much exercise    Avoids salt but eating a lot    Only on 25 mg hydrochlorothiazide daily    Feel relaxed     Overall feeling well    Still stress as landlord        Physical Exam   Constitutional: He is oriented to person, place, and time. He appears well-developed and well-nourished.   HENT:   Head: Normocephalic and atraumatic.   Eyes: Conjunctivae are normal.   Neck: Carotid bruit is not present.   Cardiovascular: Normal rate, regular rhythm, normal heart sounds and intact distal pulses.   Pulmonary/Chest: Effort normal and breath sounds normal. No respiratory distress.   Abdominal: Soft. There is no tenderness.   Musculoskeletal: He exhibits no edema.   Neurological: He is alert and oriented to person, place, and time. No cranial nerve deficit.   Psychiatric: He has a normal mood and affect. His speech is normal and behavior is normal.       ASSESSMENT / PLAN:  (I10) Hypertension goal BP (blood pressure) < 140/90  (primary encounter diagnosis)  Comment: patient needs refills of blood pressure meds.   On recheck blood pressure not bad.   Plan: metoprolol succinate ER (TOPROL-XL) 50 MG 24 hr        tablet, amLODIPine (NORVASC) 2.5 MG tablet,         losartan (COZAAR) 100 MG tablet,         hydrochlorothiazide (HYDRODIURIL) 25 MG tablet,        DISCONTINUED: hydrochlorothiazide (HYDRODIURIL)        25 MG tablet, DISCONTINUED: losartan (COZAAR)         100 MG tablet             (M25.561) Right knee pain, unspecified  chronicity  Comment: patient only uses the occasional pain med.  He clarified the 4 main diagnoses for which he takes the pain med.  Plan: HYDROcodone-acetaminophen (NORCO) 5-325 MG         tablet             (K21.9) Gastroesophageal reflux disease, esophagitis presence not specified  Comment: needs to call [facility] to get the upper and lower scope exams  Plan: stay on ppi for now     (M79.10) Muscle pain  Comment: discussed in detail. Will try new muscle relaxer.  Use mainly at night; warned of sedation   Plan: methocarbamol (ROBAXIN) 500 MG tablet             (Z98.1) History of lumbar fusion  Comment: as above. Use sparingly   Plan: HYDROcodone-acetaminophen (NORCO) 5-325 MG         tablet             (M25.571) Right ankle pain, unspecified chronicity  Comment: as above   Plan: HYDROcodone-acetaminophen (NORCO) 5-325 MG         tablet             (M25.511) Right shoulder pain, unspecified chronicity  Comment: as above   Plan: HYDROcodone-acetaminophen (NORCO) 5-325 MG         tablet             (K21.9) Gastroesophageal reflux disease without esophagitis  Comment: refill med   Plan: omeprazole (PRILOSEC) 20 MG DR capsule             (F41.9) Anxiety  Comment: ongoing stress   Plan: monitor    Obesity: ongoing issue.  Keep working on healthy diet/exercise and wt loss.      I reviewed the patient's medications, allergies, medical history, family history, and social history.    Richi Jaramillo MD

## 2019-02-13 ENCOUNTER — TELEPHONE (OUTPATIENT)
Dept: FAMILY MEDICINE | Facility: CLINIC | Age: 69
End: 2019-02-13

## 2019-02-13 DIAGNOSIS — L98.9 SKIN LESION: Primary | ICD-10-CM

## 2019-02-13 NOTE — TELEPHONE ENCOUNTER
Routing to PCP to review and advise.    Requesting referrals.    Tika Mistry RN  [facility]

## 2019-02-13 NOTE — TELEPHONE ENCOUNTER
Reason for Call:  Other Referrals    Detailed comments: Patient would like two referrals:  1) referral to [facility] for skin tags; and 2) prescription for a massage, [facility] (across the street above [facility] chiropractor, latha chong from [place])     Phone Number Patient can be reached at: Home number on file [phone] (home)    Best Time: anytime    Can we leave a detailed message on this number? YES    Call taken on 2/13/2019 at 2:35 PM by Nadine Velasco

## 2019-02-13 NOTE — TELEPHONE ENCOUNTER
Patient would like the referrals emailed to him, if possible.  If not, you can call him and he will pick them up at the .    Nadine JONES.  Patient Representative  [place]

## 2019-02-14 NOTE — TELEPHONE ENCOUNTER
Did referral for dermatology.  Two choices available for patient.     Massage is not covered by insurance even if we do a referral.    Please inform patient.    Richi Jaramillo MD

## 2019-02-14 NOTE — TELEPHONE ENCOUNTER
Spoke with pt and he is going to stop by the clinc and  referral. Brought to the  for .  Wen Barnard  Team 3 Coordinator

## 2019-02-16 NOTE — TELEPHONE ENCOUNTER
Did addendum as patient wants to use hsa type money for massage    Will give paper copy to patient    Richi Jaramillo MD

## 2019-02-25 ENCOUNTER — TELEPHONE (OUTPATIENT)
Dept: FAMILY MEDICINE | Facility: CLINIC | Age: 69
End: 2019-02-25

## 2019-02-25 DIAGNOSIS — J01.90 ACUTE SINUSITIS, RECURRENCE NOT SPECIFIED, UNSPECIFIED LOCATION: ICD-10-CM

## 2019-02-25 RX ORDER — DOXYCYCLINE 100 MG/1
100 CAPSULE ORAL 2 TIMES DAILY
Qty: 20 CAPSULE | Refills: 0 | Status: SHIPPED | OUTPATIENT
Start: 2019-02-25 | End: 2019-03-29

## 2019-02-25 NOTE — TELEPHONE ENCOUNTER
"Called patient and he stated that he needs a refill of the \"last antibiotic\" he was prescribed for his lingering cold. Nurse notes that azithromycin and augmentin did not work and the patient was prescribed doxycycline.    Patient also called for an update: see message below. Disregard the message about amlodipine he was referring to an anitbiotic.      Routed to provider. Pharmacy cued.     Shelbi Bruno RN    "

## 2019-02-25 NOTE — TELEPHONE ENCOUNTER
Called patient and relayed the message below. Patient verbalized understanding and agreeable to plan.     Shelbi Bruno RN

## 2019-02-25 NOTE — TELEPHONE ENCOUNTER
Reason for Call:  Medication/questions     Detailed comments: Patient wanted to update- no skin cancer, 32 tags, questions about needing another does for amLODIPine (NORVASC) 2.5 MG tablet. Please call back to make sure this is correct.     Phone Number Patient can be reached at: Cell number on file:    Telephone Information:   Mobile [phone]       Best Time: Anytime    Can we leave a detailed message on this number? YES    Call taken on 2/25/2019 at 11:25 AM by Maricruz Mcclain

## 2019-02-25 NOTE — TELEPHONE ENCOUNTER
I sent in a refill of the doxycycline    Follow up in clinic if symptoms not resolving after that    Please inform patient    Richi Jaramillo MD

## 2019-03-29 ENCOUNTER — OFFICE VISIT (OUTPATIENT)
Dept: FAMILY MEDICINE | Facility: CLINIC | Age: 69
End: 2019-03-29
Payer: COMMERCIAL

## 2019-03-29 VITALS
OXYGEN SATURATION: 96 % | DIASTOLIC BLOOD PRESSURE: 72 MMHG | SYSTOLIC BLOOD PRESSURE: 155 MMHG | TEMPERATURE: 97.8 F | HEART RATE: 71 BPM | BODY MASS INDEX: 38.95 KG/M2 | WEIGHT: 279.25 LBS

## 2019-03-29 DIAGNOSIS — I10 HYPERTENSION GOAL BP (BLOOD PRESSURE) < 140/90: ICD-10-CM

## 2019-03-29 DIAGNOSIS — Z91.09 ENVIRONMENTAL ALLERGIES: ICD-10-CM

## 2019-03-29 DIAGNOSIS — E66.9 OBESITY, UNSPECIFIED OBESITY SEVERITY, UNSPECIFIED OBESITY TYPE: ICD-10-CM

## 2019-03-29 DIAGNOSIS — I85.00 ESOPHAGEAL VARICES WITHOUT BLEEDING, UNSPECIFIED ESOPHAGEAL VARICES TYPE (H): ICD-10-CM

## 2019-03-29 DIAGNOSIS — M25.561 RIGHT KNEE PAIN, UNSPECIFIED CHRONICITY: ICD-10-CM

## 2019-03-29 DIAGNOSIS — M25.511 RIGHT SHOULDER PAIN, UNSPECIFIED CHRONICITY: ICD-10-CM

## 2019-03-29 DIAGNOSIS — M25.571 RIGHT ANKLE PAIN, UNSPECIFIED CHRONICITY: ICD-10-CM

## 2019-03-29 DIAGNOSIS — Z86.0100 HISTORY OF COLONIC POLYPS: Primary | ICD-10-CM

## 2019-03-29 DIAGNOSIS — Z98.1 HISTORY OF LUMBAR FUSION: ICD-10-CM

## 2019-03-29 PROCEDURE — 99214 OFFICE O/P EST MOD 30 MIN: CPT | Performed by: FAMILY MEDICINE

## 2019-03-29 RX ORDER — AMLODIPINE BESYLATE 5 MG/1
5 TABLET ORAL DAILY
Qty: 90 TABLET | Refills: 1 | Status: SHIPPED | OUTPATIENT
Start: 2019-03-29 | End: 2019-10-25

## 2019-03-29 RX ORDER — HYDROCODONE BITARTRATE AND ACETAMINOPHEN 5; 325 MG/1; MG/1
1 TABLET ORAL EVERY 6 HOURS PRN
Qty: 24 TABLET | Refills: 0 | Status: ON HOLD | OUTPATIENT
Start: 2019-03-29 | End: 2020-07-09

## 2019-03-29 ASSESSMENT — PAIN SCALES - GENERAL: PAINLEVEL: NO PAIN (0)

## 2019-03-29 NOTE — PATIENT INSTRUCTIONS
Call for scheduling upper and lower scope exams at [facility]    Increase amlodipine to 2 of the 2.5 mg tabs ( 5 mg once daily ). If you get worsened swelling let us know.   If working okay then fill the 5 mg dose prescription    Take a claritin in morning daily and can also use a benadryl at night if needed    Let us know in a few weeks if symptoms not improving    .
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
## 2019-04-15 ENCOUNTER — TELEPHONE (OUTPATIENT)
Dept: GASTROENTEROLOGY | Facility: CLINIC | Age: 69
End: 2019-04-15

## 2019-04-15 ENCOUNTER — ALLIED HEALTH/NURSE VISIT (OUTPATIENT)
Dept: FAMILY MEDICINE | Facility: CLINIC | Age: 69
End: 2019-04-15
Payer: COMMERCIAL

## 2019-04-15 VITALS — DIASTOLIC BLOOD PRESSURE: 71 MMHG | HEART RATE: 79 BPM | SYSTOLIC BLOOD PRESSURE: 150 MMHG

## 2019-04-15 DIAGNOSIS — I10 HYPERTENSION GOAL BP (BLOOD PRESSURE) < 140/90: Primary | ICD-10-CM

## 2019-04-15 DIAGNOSIS — Z12.11 SPECIAL SCREENING FOR MALIGNANT NEOPLASMS, COLON: Primary | ICD-10-CM

## 2019-04-15 PROCEDURE — 99207 ZZC NO CHARGE NURSE ONLY: CPT | Performed by: FAMILY MEDICINE

## 2019-04-15 NOTE — PROGRESS NOTES
Gucci Logan was evaluated at [facility] on April 15, 2019 at which time his blood pressure was:    BP Readings from Last 3 Encounters:   04/15/19 150/71   03/29/19 155/72   02/05/19 135/68     Pulse Readings from Last 3 Encounters:   04/15/19 79   03/29/19 71   02/05/19 87       Reviewed lifestyle modifications for blood pressure control and reduction: including making healthy food choices, managing weight, getting regular exercise, smoking cessation, reducing alcohol consumption, monitoring blood pressure regularly.     Symptoms: None    BP Goal:< 140/90 mmHg    BP Assessment:  BP too high- pt states he was at [facility] and they checked his BP today at 1:00pm with a wrist cuff and it was 144/70.  We used an electronic arm machine to check his BP this afternoon.     Potential Reasons for BP too high: NA - Not applicable    BP Follow-Up Plan: Recheck BP in 30 days at pharmacy    Recommendation to Provider: n/a    Note completed by: Key Diallo  Pharm.D.  [facility]  [facility]  On Behalf of [facility] [place]

## 2019-04-15 NOTE — TELEPHONE ENCOUNTER
"Patient scheduled for: EGD and colonoscopy    Indication for procedure: History of colon polyps, esophageal varices screening    Referring Provider: Richi Jaramillo MD    ? N/A    Arrival time verified? 8:15am on 4/24/2019    Facility location verified? [address] [facility], 5th [address]    Instructions given regarding prep and procedure    Prep Type: Split-Dose GoLytely    Are you taking any anticoagulants or blood thinners? None    Electronic implanted devices? None    Pre procedure teaching completed? Yes.  GoLytely prep explained to patient and emailed to patient per patient request.    Transportation from procedure? Yes, neighbor.  Patient stated someone would be with him for \"a couple of hours\" after the procedure.  RN reviewed that patient would need a responsible adult with him for 6 hours post-procedure.  Patient acknowledged this and said he would try to arrange this.    H&P / Pre op physical completed? N/A        "

## 2019-04-19 ENCOUNTER — OFFICE VISIT (OUTPATIENT)
Dept: FAMILY MEDICINE | Facility: CLINIC | Age: 69
End: 2019-04-19
Payer: COMMERCIAL

## 2019-04-19 ENCOUNTER — ANCILLARY PROCEDURE (OUTPATIENT)
Dept: GENERAL RADIOLOGY | Facility: CLINIC | Age: 69
End: 2019-04-19
Attending: FAMILY MEDICINE
Payer: COMMERCIAL

## 2019-04-19 VITALS
TEMPERATURE: 97.3 F | SYSTOLIC BLOOD PRESSURE: 139 MMHG | HEART RATE: 65 BPM | DIASTOLIC BLOOD PRESSURE: 65 MMHG | WEIGHT: 279 LBS | BODY MASS INDEX: 38.91 KG/M2

## 2019-04-19 DIAGNOSIS — R05.9 COUGH: Primary | ICD-10-CM

## 2019-04-19 DIAGNOSIS — R61 NIGHT SWEATS: ICD-10-CM

## 2019-04-19 DIAGNOSIS — E11.9 TYPE 2 DIABETES MELLITUS WITHOUT COMPLICATION, WITHOUT LONG-TERM CURRENT USE OF INSULIN (H): ICD-10-CM

## 2019-04-19 DIAGNOSIS — E55.9 VITAMIN D DEFICIENCY DISEASE: ICD-10-CM

## 2019-04-19 DIAGNOSIS — R05.9 COUGH: ICD-10-CM

## 2019-04-19 DIAGNOSIS — R53.83 FATIGUE, UNSPECIFIED TYPE: ICD-10-CM

## 2019-04-19 DIAGNOSIS — L84 CORN OF FOOT: ICD-10-CM

## 2019-04-19 DIAGNOSIS — E78.5 HYPERLIPIDEMIA LDL GOAL <100: ICD-10-CM

## 2019-04-19 DIAGNOSIS — J20.9 ACUTE BRONCHITIS WITH SYMPTOMS > 10 DAYS: ICD-10-CM

## 2019-04-19 PROCEDURE — 99214 OFFICE O/P EST MOD 30 MIN: CPT | Mod: 25 | Performed by: FAMILY MEDICINE

## 2019-04-19 PROCEDURE — 71046 X-RAY EXAM CHEST 2 VIEWS: CPT

## 2019-04-19 PROCEDURE — 99207 C FOOT EXAM  NO CHARGE: CPT | Mod: 25 | Performed by: FAMILY MEDICINE

## 2019-04-19 PROCEDURE — 11055 PARING/CUTG B9 HYPRKER LES 1: CPT | Mod: Q8 | Performed by: FAMILY MEDICINE

## 2019-04-19 NOTE — PATIENT INSTRUCTIONS
Hold vitamins until after procedure    Take blood pressure meds early am of procedure with water    Push fluids when doing the colon prep    Avoid anti-inflammatories but tylenol and norco okay if needed    Start antibiotics. If feeling better early next week, stay on antibiotics and do colonoscopy.  If not better, delay procedure    Schedule fasting lab appointment

## 2019-04-19 NOTE — PROGRESS NOTES
SUBJECTIVE:   Gucci Logan is a 68 year old male who presents to clinic today for the following   health issues:      PT would like to talk to about colonoscopy.     Pt would like to talk about night sweats.    Pt would like to talk about dehydration and loosing teeth because of it dehydration.         Additional history: as documented    Reviewed  and updated as needed this visit by clinical staff  Tobacco  Allergies  Meds  Med Hx  Surg Hx  Fam Hx  Soc Hx        Reviewed and updated as needed this visit by Provider               bad nightsweats for at least 1-2 weeks    Same    3 pillows per night    During day not sweating    Always feels dehydrated    Tried biotene spray    Head shoulder upper back sweating at night, not below that    Bedroom not hot    claritin and benadryl didn't do much    Still coughing not as bad    Raspy voice      Some nasal green drainage    Urinating normally    Still at 5 mg amlodipine   Did not got to 10 mg     Physical Exam   Constitutional: He is oriented to person, place, and time. He appears well-developed and well-nourished.   HENT:   Head: Normocephalic and atraumatic.   Right Ear: Tympanic membrane, external ear and ear canal normal.   Left Ear: Tympanic membrane, external ear and ear canal normal.   Nose: Nose normal.   Mouth/Throat: Oropharynx is clear and moist.   No tenderness over sinuses/ submandib tenderness   Eyes: Conjunctivae are normal.   Neck: Carotid bruit is not present.   Cardiovascular: Normal rate, regular rhythm, normal heart sounds and intact distal pulses.   Pulmonary/Chest: Effort normal and breath sounds normal. No respiratory distress.   Musculoskeletal: He exhibits no edema.   Neurological: He is alert and oriented to person, place, and time. No cranial nerve deficit.   Psychiatric: He has a normal mood and affect. His speech is normal and behavior is normal.   foot exam okay bilaterally except tender hard corn / callus present lateral  aspect of right foot  With consent, trimmed down nicely.  Good result.  No complications.    cxr done, possible small retrocardiac infiltrate per my reading?    ASSESSMENT / PLAN:  (R05) Cough  (primary encounter diagnosis)  Comment: xray as above  Plan: XR Chest 2 Views        Given the nightsweats, will treat.  Cough not real bad.  No wt loss.     (L84) Corn of foot  Comment: trimmed here, good result  Plan: TRIM HYPERKERATOTIC SKIN LESION, ONE        Follow up prn     (R61) Night sweats  Comment: if not resolving pursue further testing   Plan: patient agreed    (E55.9) Vitamin D deficiency disease  Comment: check ; low in past   Plan: Vitamin D Deficiency             (R53.83) Fatigue, unspecified type  Comment: check   Plan: Comprehensive metabolic panel, TSH with free T4        reflex             (E78.5) Hyperlipidemia LDL goal <100  Comment: check when fasting   Plan: Lipid panel reflex to direct LDL Fasting, CBC         with platelets differential             (E11.9) Type 2 diabetes mellitus without complication, without long-term current use of insulin (H)  Comment: recheck hemoglobin a1c ; patient not on meds currently   Plan: FOOT EXAM, Albumin Random Urine Quantitative         with Creat Ratio, Hemoglobin A1c, OPTOMETRY         REFERRAL             (J20.9) Acute bronchitis with symptoms > 10 days  Comment: will treat.    Plan: amoxicillin-clavulanate (AUGMENTIN) 875-125 MG         tablet        Call/ return to clinic if symptoms not resolving       I reviewed the patient's medications, allergies, medical history, family history, and social history.    Richi Jaramillo MD         Total Body Time: 1:15

## 2019-04-24 ENCOUNTER — HOSPITAL ENCOUNTER (OUTPATIENT)
Facility: AMBULATORY SURGERY CENTER | Age: 69
End: 2019-04-24
Attending: INTERNAL MEDICINE
Payer: COMMERCIAL

## 2019-04-24 VITALS
RESPIRATION RATE: 16 BRPM | WEIGHT: 275 LBS | HEART RATE: 60 BPM | DIASTOLIC BLOOD PRESSURE: 73 MMHG | HEIGHT: 71 IN | BODY MASS INDEX: 38.5 KG/M2 | SYSTOLIC BLOOD PRESSURE: 134 MMHG | TEMPERATURE: 98.1 F | OXYGEN SATURATION: 96 %

## 2019-04-24 DIAGNOSIS — K74.60 HEPATIC CIRRHOSIS, UNSPECIFIED HEPATIC CIRRHOSIS TYPE, UNSPECIFIED WHETHER ASCITES PRESENT (H): Primary | ICD-10-CM

## 2019-04-24 DIAGNOSIS — E11.9 TYPE 2 DIABETES MELLITUS WITHOUT COMPLICATION, WITHOUT LONG-TERM CURRENT USE OF INSULIN (H): ICD-10-CM

## 2019-04-24 DIAGNOSIS — R53.83 FATIGUE, UNSPECIFIED TYPE: ICD-10-CM

## 2019-04-24 DIAGNOSIS — Z12.11 COLON CANCER SCREENING: ICD-10-CM

## 2019-04-24 DIAGNOSIS — E78.5 HYPERLIPIDEMIA LDL GOAL <100: ICD-10-CM

## 2019-04-24 DIAGNOSIS — E55.9 VITAMIN D DEFICIENCY DISEASE: ICD-10-CM

## 2019-04-24 LAB
ALBUMIN SERPL-MCNC: 3.5 G/DL (ref 3.4–5)
ALP SERPL-CCNC: 86 U/L (ref 40–150)
ALT SERPL W P-5'-P-CCNC: 77 U/L (ref 0–70)
ANION GAP SERPL CALCULATED.3IONS-SCNC: 5 MMOL/L (ref 3–14)
AST SERPL W P-5'-P-CCNC: 63 U/L (ref 0–45)
BASOPHILS # BLD AUTO: 0 10E9/L (ref 0–0.2)
BASOPHILS NFR BLD AUTO: 0.3 %
BILIRUB SERPL-MCNC: 1.7 MG/DL (ref 0.2–1.3)
BUN SERPL-MCNC: 10 MG/DL (ref 7–30)
CALCIUM SERPL-MCNC: 8.9 MG/DL (ref 8.5–10.1)
CHLORIDE SERPL-SCNC: 102 MMOL/L (ref 94–109)
CHOLEST SERPL-MCNC: 176 MG/DL
CO2 SERPL-SCNC: 31 MMOL/L (ref 20–32)
COLONOSCOPY: NORMAL
CREAT SERPL-MCNC: 0.92 MG/DL (ref 0.66–1.25)
DIFFERENTIAL METHOD BLD: ABNORMAL
EOSINOPHIL # BLD AUTO: 0.1 10E9/L (ref 0–0.7)
EOSINOPHIL NFR BLD AUTO: 2.3 %
ERYTHROCYTE [DISTWIDTH] IN BLOOD BY AUTOMATED COUNT: 13.2 % (ref 10–15)
GFR SERPL CREATININE-BSD FRML MDRD: 85 ML/MIN/{1.73_M2}
GLUCOSE SERPL-MCNC: 137 MG/DL (ref 70–99)
HBA1C MFR BLD: 7.4 % (ref 0–5.6)
HCT VFR BLD AUTO: 46 % (ref 40–53)
HDLC SERPL-MCNC: 41 MG/DL
HGB BLD-MCNC: 15.2 G/DL (ref 13.3–17.7)
IMM GRANULOCYTES # BLD: 0 10E9/L (ref 0–0.4)
IMM GRANULOCYTES NFR BLD: 0.2 %
LDLC SERPL CALC-MCNC: 89 MG/DL
LYMPHOCYTES # BLD AUTO: 1 10E9/L (ref 0.8–5.3)
LYMPHOCYTES NFR BLD AUTO: 16.9 %
MCH RBC QN AUTO: 32.3 PG (ref 26.5–33)
MCHC RBC AUTO-ENTMCNC: 33 G/DL (ref 31.5–36.5)
MCV RBC AUTO: 98 FL (ref 78–100)
MONOCYTES # BLD AUTO: 0.6 10E9/L (ref 0–1.3)
MONOCYTES NFR BLD AUTO: 8.9 %
NEUTROPHILS # BLD AUTO: 4.4 10E9/L (ref 1.6–8.3)
NEUTROPHILS NFR BLD AUTO: 71.4 %
NONHDLC SERPL-MCNC: 135 MG/DL
NRBC # BLD AUTO: 0 10*3/UL
NRBC BLD AUTO-RTO: 0 /100
PLATELET # BLD AUTO: 103 10E9/L (ref 150–450)
POTASSIUM SERPL-SCNC: 3.5 MMOL/L (ref 3.4–5.3)
PROT SERPL-MCNC: 7.6 G/DL (ref 6.8–8.8)
RBC # BLD AUTO: 4.71 10E12/L (ref 4.4–5.9)
SODIUM SERPL-SCNC: 137 MMOL/L (ref 133–144)
T4 FREE SERPL-MCNC: 1.02 NG/DL (ref 0.76–1.46)
TRIGL SERPL-MCNC: 230 MG/DL
TSH SERPL DL<=0.005 MIU/L-ACNC: 6 MU/L (ref 0.4–4)
UPPER GI ENDOSCOPY: NORMAL
WBC # BLD AUTO: 6.2 10E9/L (ref 4–11)

## 2019-04-24 RX ORDER — FENTANYL CITRATE 50 UG/ML
INJECTION, SOLUTION INTRAMUSCULAR; INTRAVENOUS PRN
Status: DISCONTINUED | OUTPATIENT
Start: 2019-04-24 | End: 2019-04-24 | Stop reason: HOSPADM

## 2019-04-24 RX ORDER — ONDANSETRON 2 MG/ML
4 INJECTION INTRAMUSCULAR; INTRAVENOUS
Status: DISCONTINUED | OUTPATIENT
Start: 2019-04-24 | End: 2019-04-25 | Stop reason: HOSPADM

## 2019-04-24 RX ORDER — SIMETHICONE
LIQUID (ML) MISCELLANEOUS PRN
Status: DISCONTINUED | OUTPATIENT
Start: 2019-04-24 | End: 2019-04-24 | Stop reason: HOSPADM

## 2019-04-24 RX ORDER — LIDOCAINE 40 MG/G
CREAM TOPICAL
Status: DISCONTINUED | OUTPATIENT
Start: 2019-04-24 | End: 2019-04-25 | Stop reason: HOSPADM

## 2019-04-24 ASSESSMENT — MIFFLIN-ST. JEOR: SCORE: 2044.52

## 2019-04-24 NOTE — LETTER
[facility]   [address] [place]  [address]  [phone]                                   April 26, 2019    Gucci Logan  [address] [facility] [address]        Dear Gucci,    The diabetes test ( hemoglobin a1c ) is a little higher than last time.  Not quite to the point where we need to start a diabetes medication.    Work real hard on healthy diet, increased exercise, and weight loss.    One thyroid test ( TSH ) is off but the follow up test ( free T4 ) is normal, so no thyroid medication needed.    A couple liver tests ( ALT and AST ) are slightly elevated.    Triglycerides are moderately high.    Other labs are okay.    Results for orders placed or performed during the hospital encounter of 04/24/19   UPPER GI ENDOSCOPY   Result Value Ref Range    Upper GI Endoscopy       [facility]  [address] [place] [id number] [phone]     Endoscopy Department  _______________________________________________________________________________  Patient Name: Gucci Logan       Procedure Date: 4/24/2019 8:01 AM  MRN: [id number]                       Account Number: [id number]  YOB: 1950              Admit Type: Outpatient  Age: 68                                Gender: Male  Note Status: Finalized                Attending MD: Thomas M Leventhal , MD  Total Sedation Time:                    _______________________________________________________________________________     Procedure:           Upper GI endoscopy  Indications:         Follow-up of esophageal varices  Providers:           Thomas M. Leventhal, MD, Tonya Fowler RN  Referring MD:        Richi Jaramillo MD  Medicines:           Midazolam 4 mg IV, Fentanyl 200 micrograms IV,                        Benzocaine spray  Complications:       No immediate complic ations.  _______________________________________________________________________________  Procedure:            Pre-Anesthesia Assessment:                       - Prior to the procedure, a History and Physical was                        performed, and patient medications and allergies were                        reviewed. The patient is competent. The risks and                        benefits of the procedure and the sedation options and                        risks were discussed with the patient. All questions                        were answered and informed consent was obtained. Patient                        identification and proposed procedure were verified by                        the physician and the nurse in the pre-procedure area.                        Mental Status Examination: alert and oriented. Airway                        Examination: normal oropharyngeal airway and neck                        mobility. Respiratory Examination: clear to                        auscult ation. CV Examination: regular rate and rhythm.                        Prophylactic Antibiotics: The patient does not require                        prophylactic antibiotics. Prior Anticoagulants: The                        patient has taken no previous anticoagulant or                        antiplatelet agents. ASA Grade Assessment: III - A                        patient with severe systemic disease. After reviewing                        the risks and benefits, the patient was deemed in                        satisfactory condition to undergo the procedure. The                        anesthesia plan was to use moderate sedation / analgesia                        (conscious sedation). Immediately prior to                        administration of medications, the patient was                        re-assessed for adequacy to receive sedatives. The heart                        rate, respiratory rate, oxygen saturations, blood                        pressure, adequacy of pulmonary venti lation, and                        response to  care were monitored throughout the                        procedure. The physical status of the patient was                        re-assessed after the procedure.                       After obtaining informed consent, the endoscope was                        passed under direct vision. Throughout the procedure,                        the patient's blood pressure, pulse, and oxygen                        saturations were monitored continuously. The Endoscope                        was introduced through the mouth, and advanced to the                        second part of duodenum. The upper GI endoscopy was                        accomplished without difficulty. The patient tolerated                        the procedure well.                                                                                   Findings:       Small (< 5 mm) varices were found in the lower third of the esophagus.       The lower third of the esophagus was mild ly tortuous.       Mild portal hypertensive gastropathy was found in the gastric fundus, on        the greater curvature of the stomach and in the gastric antrum.       There is no endoscopic evidence of varices in the cardia and in the        gastric fundus.       The examined duodenum was normal.                                                                                   Moderate Sedation:       The administration of moderate sedation was initiated at 08:59 AM.       Moderate (conscious) sedation was administered by the endoscopy nurse        and supervised by the endoscopist. The following parameters were        monitored: oxygen saturation, heart rate, blood pressure, and response        to care. Total physician intraservice time was 11 minutes.       An independent trained observer was present and continuously monitored        the patient.       This was in 1:1 supervision of sedated patient  Impression:          - Small (< 5 mm) esophageal varices.                        - T ortuous esophagus.                       - Portal hypertensive gastropathy.                       - Normal examined duodenum.                       - No specimens collected.  Recommendation:      - Perform a colonoscopy today.                                                                                     ______________________  Thomas M Leventhal, MD  4/24/2019 9:38:54 AM  Number of Addenda: 0    Note Initiated On: 4/24/2019 8:01 AM  Scope In:  Scope Out:     COLONOSCOPY   Result Value Ref Range    COLONOSCOPY       [facility]  [address] [place] [address] [place] [address] [phone]     Endoscopy Department  _______________________________________________________________________________  Patient Name: Gucci Alexandersksara       Procedure Date: 4/24/2019 7:56 AM  MRN: [id number]                       Account Number: [id number]  YOB: 1950              Admit Type: Outpatient  Age: 68                                Gender: Male  Note Status: Finalized                Attending MD: Thomas M Leventhal , MD  Total Sedation Time:                    _______________________________________________________________________________     Procedure:           Colonoscopy  Indications:         High risk colon cancer surveillance: Personal history of                        colonic polyps  Providers:           Thomas M. Leventhal, MD, Tonya Fowler RN  Referring MD:        Richi Jaramillo MD  Medicines:           Midazolam 1 mg IV, Fentanyl 50 micrograms IV  Complications:       No imm ediate complications.  _______________________________________________________________________________  Procedure:           Pre-Anesthesia Assessment:                       - Prior to the procedure, a History and Physical was                        performed, and patient medications and allergies were                        reviewed. The patient is competent. The risks and                        benefits of the  procedure and the sedation options and                        risks were discussed with the patient. All questions                        were answered and informed consent was obtained. Patient                        identification and proposed procedure were verified by                        the physician and the nurse in the pre-procedure area.                        Mental Status Examination: alert and oriented. Airway                        Examination: normal oropharyngeal airway and neck                        mobility. Respiratory Examination: clear to                         auscultation. CV Examination: regular rate and rhythm.                        Prophylactic Antibiotics: The patient does not require                        prophylactic antibiotics. Prior Anticoagulants: The                        patient has taken no previous anticoagulant or                        antiplatelet agents. ASA Grade Assessment: III - A                        patient with severe systemic disease. After reviewing                        the risks and benefits, the patient was deemed in                        satisfactory condition to undergo the procedure. The                        anesthesia plan was to use moderate sedation / analgesia                        (conscious sedation). Immediately prior to                        administration of medications, the patient was                        re-assessed for adequacy to receive sedatives. The heart                        rate, respiratory rate, oxygen saturations, blood                        pressure, adequacy of p ulmonary ventilation, and                        response to care were monitored throughout the                        procedure. The physical status of the patient was                        re-assessed after the procedure.                       After obtaining informed consent, the colonoscope was                        passed under direct vision. Throughout  the procedure,                        the patient's blood pressure, pulse, and oxygen                        saturations were monitored continuously. The Colonoscope                        was introduced through the anus and advanced to the                        terminal ileum, with identification of the appendiceal                        orifice and IC valve. The colonoscopy was performed                        without difficulty. The patient tolerated the procedure                        well. The quality of the bowel preparation was adequate                        to identify polyps.                                                                                    Findings:       The perianal and digital rectal examinations were normal.       A few small-mouthed diverticula were found in the recto-sigmoid colon        and sigmoid colon.       A 3 mm polyp was found in the transverse colon. The polyp was sessile.        The polyp was removed with a jumbo cold forceps. Resection and retrieval        were complete. Verification of patient identification for the specimen        was done by the physician, nurse and technician using the patient's        name, birth date and medical record number. Estimated blood loss was        minimal.       Three sessile polyps were found in the rectum and descending colon. The        polyps were 2 to 3 mm in size. These polyps were removed with a jumbo        cold forceps. Resection and retrieval were complete. Verification of        patient identification for the specimen was done by the physician, nurse        and technician using the patient's name, birth date and medical  record        number. Estimated blood loss was minimal.       A small amount of semi-liquid stool was found in the ascending colon and        in the cecum, without interference to visualization.       The terminal ileum appeared normal.       The retroflexed view of the distal rectum and anal verge was normal  and        showed no anal or rectal abnormalities.                                                                                   Moderate Sedation:       The administration of moderate sedation was initiated at 08:59 AM.       Moderate (conscious) sedation was administered by the endoscopy nurse        and supervised by the endoscopist. The following parameters were        monitored: oxygen saturation, heart rate, blood pressure, and response        to care. Total physician intraservice time was 20 minutes.  Impression:          - Diverticulosis in the recto-sigmoid colon and in the                        sigmoid colon.                       - One 3 mm polyp in the transverse  colon, removed with a                        jumbo cold forceps. Resected and retrieved.                       - Three 2 to 3 mm polyps in the rectum and in the                        descending colon, removed with a jumbo cold forceps.                        Resected and retrieved.                       - Stool in the ascending colon and in the cecum.                       - The examined portion of the ileum was normal.  Recommendation:      - Discharge patient to home.                       - Resume previous diet.                       - Continue present medications.                       - Await pathology results.                       - Repeat colonoscopy in 5-10 years for surveillance                        based on pathology results.                       - Return to primary care physician as previously                        scheduled.                                                                                     ______________________  Thomas M Leventhal, MD  4/24/2019 9:4 3:59 AM  Number of Addenda: 0    Note Initiated On: 4/24/2019 7:56 AM  Scope In:  Scope Out:     Surgical pathology exam   Result Value Ref Range    Copath Report       Patient Name: LAURENT PEREIRA  MR#: [id number]  Specimen #: [id number]  Collected:  "4/24/2019  Received: 4/24/2019  Reported: 4/25/2019 09:45  Ordering Phy(s): THOMAS MICHAEL LEVENTHAL    For improved result formatting, select 'View Enhanced Report Format' under   Linked Documents section.    SPECIMEN(S):  A: Colon polyp, transverse  B: Colon polyps, descending and rectum    FINAL DIAGNOSIS:  A. COLON POLYP, TRANSVERSE, POLYPECTOMY:  - Colonic mucosa with no significant histologic abnormality  - Negative for dysplasia    B. COLON POLYPS, DESCENDING AND RECTUM, POLYPECTOMY:  - One tubular adenoma, negative for high-grade dysplasia  - Hyperplastic polyp(s)    I have personally reviewed all specimens and/or slides, including the   listed special stains, and used them  with my medical judgement to determine or confirm the final diagnosis.    Electronically signed out by:    Louis Pandya M.D., [facility]    CLINICAL HISTORY:    Screening    GROSS:  A:  The specimen is received in f ormalin with proper patient   identification, labeled \"transverse colon polyp\".   The specimen consists of a 0.4 cm in greatest dimension, polypoid tan   soft tissue which is entirely submitted  in cassette A1.    B:  The specimen is received in formalin with proper patient   identification, labeled \"descending colon polyps,  rectum polyp\".  The specimen consists of four polypoid tan pieces of soft   tissue ranging from 0.2-0.4 cm in  greatest dimension which are entirely submitted in cassette B1. (Dictated   by: Sharri Vaca 4/24/2019 12:32  PM)    MICROSCOPIC:    Microscopic exam was performed    The technical component of this testing was completed at the [facility], with the professional component performed   at the [facility], [address] [place] [phone] ([phone])    CPT Codes:  A: 34959-DG7  B: 11510-LQ0    COLLECTION SITE:  Client: [facility], " [place]  Location: [facility] (B)           If you have any questions please call the clinic at [phone]    Sincerely,    Richi Jaramillo MD  [facility]

## 2019-04-25 LAB
COPATH REPORT: NORMAL
DEPRECATED CALCIDIOL+CALCIFEROL SERPL-MC: 39 UG/L (ref 20–75)

## 2019-04-26 NOTE — RESULT ENCOUNTER NOTE
The diabetes test ( hemoglobin a1c ) is a little higher than last time.  Not quite to the point where we need to start a diabetes medication.    Work real hard on healthy diet, increased exercise, and weight loss.    One thyroid test ( TSH ) is off but the follow up test ( free T4 ) is normal, so no thyroid medication needed.    A couple liver tests ( ALT and AST ) are slightly elevated.    Triglycerides are moderately high.    Other labs are okay.    Richi Jaramillo MD

## 2019-05-03 ENCOUNTER — OFFICE VISIT (OUTPATIENT)
Dept: FAMILY MEDICINE | Facility: CLINIC | Age: 69
End: 2019-05-03
Payer: COMMERCIAL

## 2019-05-03 VITALS
DIASTOLIC BLOOD PRESSURE: 70 MMHG | WEIGHT: 277.13 LBS | HEART RATE: 66 BPM | BODY MASS INDEX: 38.65 KG/M2 | SYSTOLIC BLOOD PRESSURE: 144 MMHG | TEMPERATURE: 97.6 F

## 2019-05-03 DIAGNOSIS — Z01.818 PREOP GENERAL PHYSICAL EXAM: Primary | ICD-10-CM

## 2019-05-03 DIAGNOSIS — H02.402 PTOSIS OF LEFT EYELID: ICD-10-CM

## 2019-05-03 DIAGNOSIS — H04.552 STENOSIS OF LEFT NASOLACRIMAL DUCT: ICD-10-CM

## 2019-05-03 PROCEDURE — 99215 OFFICE O/P EST HI 40 MIN: CPT | Performed by: FAMILY MEDICINE

## 2019-05-03 ASSESSMENT — PAIN SCALES - GENERAL: PAINLEVEL: NO PAIN (0)

## 2019-05-03 NOTE — PATIENT INSTRUCTIONS
Before Your Surgery      Call your surgeon if there is any change in your health. This includes signs of a cold or flu (such as a sore throat, runny nose, cough, rash or fever).    Do not smoke, drink alcohol or take over the counter medicine (unless your surgeon or primary care doctor tells you to) for the 24 hours before and after surgery.    If you take prescribed drugs: Follow your doctor s orders about which medicines to take and which to stop until after surgery.    Eating and drinking prior to surgery: follow the instructions from your surgeon  Take a shower or bath the night before surgery. Use the soap your surgeon gave you to gently clean your skin. If you do not have soap from your surgeon, use your regular soap. Do not shave or scrub the surgery site.  Wear clean pajamas and have clean sheets on your bed.             Finish out antibiotics    Stay well hydrated    Take blood pressure meds early am of procedure    Avoid ibuprofen, aspirin, naproxen etc for the week prior    Tylenol ( acetaminophen ) okay

## 2019-05-03 NOTE — PROGRESS NOTES
[address] [id number] [phone]  Dept: [phone]    PRE-OP EVALUATION:  Today's date: 5/3/2019    Gucci Logan (: 1950) presents for pre-operative evaluation assessment as requested by Dr. Krause.  He requires evaluation and anesthesia risk assessment prior to undergoing surgery/procedure for treatment of eye .    Proposed Surgery/ Procedure: eye  Date of Surgery/ Procedure: 2019  Time of Surgery/ Procedure: [facility]/Surgical Facility: [facility] eye  Fax number for surgical facility: [phone]  Primary Physician: Richi Jaramillo  Type of Anesthesia Anticipated: Local    Patient has a Health Care Directive or Living Will:  YES     1. NO - Do you have a history of heart attack, stroke, stent, bypass or surgery on an artery in the head, neck, heart or legs?  2. NO - Do you ever have any pain or discomfort in your chest?  3. NO - Do you have a history of  Heart Failure?  4. NO - Are you troubled by shortness of breath when: walking on the level, up a slight hill or at night?  5. NO - Do you currently have a cold, bronchitis or other respiratory infection?  6. NO - Do you have a cough, shortness of breath or wheezing?  7. YES - DO YOU SOMETIMES GET PAINS IN THE CALVES OF YOUR LEGS WHEN YOU WALK? Sporadic. Still walking over 10K steps daily.   8. NO - Do you or anyone in your family have previous history of blood clots?  9. NO - Do you or does anyone in your family have a serious bleeding problem such as prolonged bleeding following surgeries or cuts?  10. NO - Have you ever had problems with anemia or been told to take iron pills?  11. NO - Have you had any abnormal blood loss such as black, tarry or bloody stools, or abnormal vaginal bleeding?  12. YES - HAVE YOU EVER HAD A BLOOD TRANSFUSION? Long time ago  13. NO - Have you or any of your relatives ever had problems with anesthesia?  14. YES - DO YOU HAVE SLEEP  APNEA, EXCESSIVE SNORING OR DAYTIME DROWSINESS? Not using cpap  15. NO - Do you have any prosthetic heart valves?  16. NO - Do you have prosthetic joints?  17. NO - Is there any chance that you may be pregnant?      HPI:     HPI related to upcoming procedure: patient has drooping left eyelid and also semi-functioning nasolacrimal duct.  To have both issues fixed per eye doctor           MEDICAL HISTORY:     Patient Active Problem List    Diagnosis Date Noted     Obesity (BMI 35.0-39.9) with comorbidity (H) 11/09/2018     Priority: Medium     Diabetes mellitus, type 2 (H) 11/09/2018     Priority: Medium     Hyperlipidemia LDL goal <100 04/19/2017     Priority: Medium     Impaired fasting glucose 04/19/2017     Priority: Medium     Gastroesophageal reflux disease, esophagitis presence not specified 10/06/2016     Priority: Medium     Obesity, unspecified obesity severity, unspecified obesity type 12/17/2015     Priority: Medium     Anxiety 11/27/2014     Priority: Medium     Hypertension goal BP (blood pressure) < 140/90 02/08/2013     Priority: Medium     Advanced directives, counseling/discussion 01/16/2013     Priority: Medium     Advance Care Planning:   ACP Review and Resources Provided:  Reviewed chart for advance care plan.  Gucci Logan has no plan or code status on file however states presence of ACP document. Copy requested. Confirmed code status reflects current choices pending receipt of document/advance care plan review. Confirmed/documented designated decision maker(s). See permanent comments section of demographics in clinical tab.   Added by Elyssa Olguin on 7/22/2014  Patient states has Advance Directive and will bring in a copy to clinic. 1/16/2013              CARDIOVASCULAR SCREENING; LDL GOAL LESS THAN 130 01/16/2013     Priority: Medium      Past Medical History:   Diagnosis Date     Arthritis      Esophageal reflux 3/1/2014     Hearing problem      Hiatal hernia      Hypertension       Liver disease      Obesity 1/24/2013     Obstructive sleep apnea      Sleep apnea     Advised CPAP machine. Not keen to use it.      Past Surgical History:   Procedure Laterality Date     ARTHROSCOPY KNEE RT/LT      (L) with partial medial meniscectomy     C OPEN RX ANKLE DISLOCATN+FIXATN      (R)     C SPINAL FUSION,ANT,EA ADNL LEVEL      T12 - L1     CATARACT IOL, RT/LT  Nov and Dec 2017     COLONOSCOPY N/A 4/24/2019    Procedure: COLONOSCOPY, WITH POLYPECTOMY AND BIOPSY;  Surgeon: Leventhal, Thomas Michael, MD;  Location: [facility] OR     DACRYOCYSTORHINOSTOMY Left 10/16/2018    Procedure: DACRYOCYSTORHINOSTOMY;  Surgeon: Madhu Krause MD;  Location: [facility]     ENDOSCOPIC ENDONASAL SURGERY  1994     ENDOSCOPY  2-19-15     ESOPHAGOSCOPY, GASTROSCOPY, DUODENOSCOPY (EGD), COMBINED N/A 4/24/2019    Procedure: COMBINED ESOPHAGOSCOPY, GASTROSCOPY, DUODENOSCOPY (EGD) - hold aspirin ibuprofen or naproxen for one week prior (per physician order);  Surgeon: Leventhal, Thomas Michael, MD;  Location:  OR     EYE SURGERY       Hernia surgery Left 1994     NASAL/SINUS POLYPECTOMY       REPAIR PTOSIS Left 10/16/2018    Procedure: LEFT UPPER LID PTOSIS AND BILATERAL  BROW PTOSIS REPAIR WITH LEFT DACRYOCYSTORHINOSTOMY ;  Surgeon: Madhu Krause MD;  Location: [facility]     REPAIR PTOSIS BROW Bilateral 10/16/2018    Procedure: REPAIR PTOSIS BROW;  Surgeon: Madhu Krause MD;  Location: [facility]     ROTATOR CUFF REPAIR RT/LT Right      Current Outpatient Medications   Medication Sig Dispense Refill     amLODIPine (NORVASC) 5 MG tablet Take 1 tablet (5 mg) by mouth daily 90 tablet 1     amoxicillin-clavulanate (AUGMENTIN) 875-125 MG tablet Take 1 tablet by mouth 2 times daily for 14 days 28 tablet 0     Cholecalciferol (VITAMIN D) 2000 UNITS CAPS 1 po daily 90 capsule 3     hydrochlorothiazide (HYDRODIURIL) 25 MG tablet Take 1 tablet (25 mg) by mouth daily 90 tablet 1     HYDROcodone-acetaminophen (NORCO) 5-325 MG  tablet Take 1 tablet by mouth every 6 hours as needed for moderate to severe pain 24 tablet 0     losartan (COZAAR) 100 MG tablet Take 1 tablet (100 mg) by mouth daily 90 tablet 1     LYRICA 50 MG capsule TAKE ONE CAPSULE BY MOUTH THREE TIMES DAILY 90 capsule 5     methocarbamol (ROBAXIN) 500 MG tablet TAKE 1 TABLET BY MOUTH THREE TIMES DAILY AS NEEDED FOR MUSCLE SPASM 30 tablet 0     metoprolol succinate ER (TOPROL-XL) 50 MG 24 hr tablet Take 1 tablet (50 mg) by mouth daily 90 tablet 1     Multiple Vitamins-Minerals (MULTIVITAMIN ADULT PO)        omeprazole (PRILOSEC) 20 MG DR capsule TAKE ONE CAPSULE BY MOUTH ONCE DAILY.  TAKE 30-60 MINUTES BEFORE A MEAL. 90 capsule 3     order for DME Equipment being ordered: compression stockings knee high, 2 pair 2 Device 0   patient has two of the antibiotic pills ( augmentin ) left      OTC products: None, except as noted above    Allergies   Allergen Reactions     Influenza Vac Typ Other (See Comments)     Delirium, fever      Latex Allergy: NO    Social History     Tobacco Use     Smoking status: Former Smoker     Years: 5.00     Types: Cigarettes     Start date: 1990     Last attempt to quit: 1999     Years since quittin.3     Smokeless tobacco: Never Used   Substance Use Topics     Alcohol use: No     Comment: quit in      History   Drug Use No       REVIEW OF SYSTEMS:   Constitutional, neuro, ENT, endocrine, pulmonary, cardiac, gastrointestinal, genitourinary, musculoskeletal, integument and psychiatric systems are negative, except as otherwise noted.    Illness symptoms better on antibiotics, only two pills left      EXAM:   /70 (BP Location: Left arm, Patient Position: Chair, Cuff Size: Adult Regular)   Pulse 66   Temp 97.6  F (36.4  C) (Oral)   Wt 125.7 kg (277 lb 2 oz)   BMI 38.65 kg/m      GENERAL APPEARANCE: healthy, alert and no distress     EYES: EOMI,  PERRL     HENT: ear canals and TM's normal and nose and mouth without ulcers or  lesions     NECK: no adenopathy, no asymmetry, masses, or scars and thyroid normal to palpation     RESP: lungs clear to auscultation - no rales, rhonchi or wheezes     CV: regular rates and rhythm, normal S1 S2, no S3 or S4 and no murmur, click or rub     ABDOMEN:  soft, nontender, no HSM or masses and bowel sounds normal     MS: extremities normal- no gross deformities noted, no evidence of inflammation in joints, FROM in all extremities.     SKIN: no suspicious lesions or rashes     NEURO: Normal strength and tone, sensory exam grossly normal, mentation intact and speech normal     PSYCH: mentation appears normal. and affect normal/bright     LYMPHATICS: No cervical adenopathy    DIAGNOSTICS:    see recent labs    Recent Labs   Lab Test 04/24/19  1020 10/01/18  0910 08/10/18  0924  11/27/17  1730  06/24/15  0953   HGB 15.2 15.6 15.1   < > 15.8   < > 14.6   * 99* 82*   < > 124*   < > 77*   INR  --   --   --   --  1.05  --  1.05     --  142   < > 140   < > 143   POTASSIUM 3.5 4.1 4.2   < > 4.0   < > 4.0   CR 0.92 1.01 0.96   < > 1.07   < > 0.97   A1C 7.4*  --  7.1*  --   --    < > 5.9    < > = values in this interval not displayed.        IMPRESSION:   Reason for surgery/procedure: drooping eyelid and partially blocked tear / nasolacrimal duct left eye   Diagnosis/reason for consult: pre-op clearance     The proposed surgical procedure is considered LOW risk.    REVISED CARDIAC RISK INDEX  The patient has the following serious cardiovascular risks for perioperative complications such as (MI, PE, VFib and 3  AV Block):  No serious cardiac risks  INTERPRETATION: 0 risks: Class I (very low risk - 0.4% complication rate)    The patient has the following additional risks for perioperative complications:  No identified additional risks      ICD-10-CM    1. Preop general physical exam Z01.818        RECOMMENDATIONS:          --Patient is to take all scheduled medications on the day of surgery EXCEPT for  modifications listed below.    Patient will take the blood pressure meds early am of procedure with sip of water    Patient not on any blood thinners    Of note, patient has mild borderline diabetes but no medications needed ( last hemoglobin a1c 7.4 )    No history of anesthesia problems    No bleeding/  Clotting problems    APPROVAL GIVEN to proceed with proposed procedure, without further diagnostic evaluation       Signed Electronically by: Richi Jaramillo MD    Copy of this evaluation report is provided to requesting physician.    [facility] Preop Guidelines    Revised Cardiac Risk Index

## 2019-05-27 ENCOUNTER — NURSE TRIAGE (OUTPATIENT)
Dept: NURSING | Facility: CLINIC | Age: 69
End: 2019-05-27

## 2019-05-28 NOTE — TELEPHONE ENCOUNTER
Gucci calls and says that he has right chest pain. Pt. Also has right arm numbness and feels dizzy. Pt. Says that he has been working today and sat down to watch TV and felt dizzy and had a warm feeling in his head. Pt. Refuses to call 911 and will take himself to an ER now.    Reason for Disposition    [1] Chest pain lasts > 5 minutes AND [2] age > 50    Additional Information    Negative: Severe difficulty breathing (e.g., struggling for each breath, speaks in single words)    Negative: Difficult to awaken or acting confused (e.g., disoriented, slurred speech)    Negative: Shock suspected (e.g., cold/pale/clammy skin, too weak to stand, low BP, rapid pulse)    Negative: [1] Chest pain lasts > 5 minutes AND [2] history of heart disease  (i.e., heart attack, bypass surgery, angina, angioplasty, CHF; not just a heart murmur)    Negative: [1] Chest pain lasts > 5 minutes AND [2] described as crushing, pressure-like, or heavy    Protocols used: CHEST PAIN-A-AH

## 2019-05-29 ENCOUNTER — TELEPHONE (OUTPATIENT)
Dept: FAMILY MEDICINE | Facility: CLINIC | Age: 69
End: 2019-05-29

## 2019-05-29 NOTE — TELEPHONE ENCOUNTER
Reason for Call:  Other appointment    Detailed comments: Patient would like to be fit it with Dr. Jaramillo. Patient needs to have 40 minute appointment and declined the one available on 6/4/19 at 6:40am. He states that he recently had eye surgery and was in the ER following the surgery, he was also seen at the vein clinic. He would like to see Dr. Jaramillo to speak with him about this things. Please call patient to schedule or with questions.     Phone Number Patient can be reached at: Cell number on file:    Telephone Information:   Mobile [phone]       Best Time: as soon as possible per patient    Can we leave a detailed message on this number? YES    Call taken on 5/29/2019 at 4:45 PM by Megan Ocampo

## 2019-05-30 NOTE — TELEPHONE ENCOUNTER
Attempt # 1  Called patient at home number.  Was call answered?  Yes, scheduled for noon on Friday.    They (the vein clinic) want to seal off the greater vein - patient has questions.      Tika Mistry RN  [facility]

## 2019-05-31 ENCOUNTER — OFFICE VISIT (OUTPATIENT)
Dept: FAMILY MEDICINE | Facility: CLINIC | Age: 69
End: 2019-05-31
Payer: COMMERCIAL

## 2019-05-31 VITALS
TEMPERATURE: 98.2 F | BODY MASS INDEX: 38.27 KG/M2 | WEIGHT: 274.38 LBS | HEART RATE: 62 BPM | SYSTOLIC BLOOD PRESSURE: 134 MMHG | OXYGEN SATURATION: 94 % | DIASTOLIC BLOOD PRESSURE: 65 MMHG

## 2019-05-31 DIAGNOSIS — R42 DIZZINESS: ICD-10-CM

## 2019-05-31 DIAGNOSIS — I83.93 VARICOSE VEINS OF BOTH LOWER EXTREMITIES, UNSPECIFIED WHETHER COMPLICATED: Primary | ICD-10-CM

## 2019-05-31 DIAGNOSIS — M17.11 PRIMARY OSTEOARTHRITIS OF RIGHT KNEE: ICD-10-CM

## 2019-05-31 PROCEDURE — 99213 OFFICE O/P EST LOW 20 MIN: CPT | Performed by: FAMILY MEDICINE

## 2019-05-31 ASSESSMENT — PAIN SCALES - GENERAL: PAINLEVEL: NO PAIN (0)

## 2019-05-31 NOTE — PATIENT INSTRUCTIONS
Maintain good hydration    Schedule appointment with Dr. Pichardo our general surgeon at [facility] regarding vein issues    Stay on same meds for now

## 2019-05-31 NOTE — PROGRESS NOTES
Subjective     Gucci Logan is a 68 year old male who presents to clinic today for the following health issues:    HPI   ED/UC Followup:    Facility:  [facility]  Date of visit: 05/27/2019  Reason for visit: dizziness and feeling flushed  Current Status: stable       none            Reviewed and updated as needed this visit by Provider         Review of Systems   To [facility] emergency room   Reviewed emergency room note and results in detail    Patient has been checking out vein options    Separately, patient has been checking out Stem cell - has not had this yet            Objective    There were no vitals taken for this visit.  There is no height or weight on file to calculate BMI.  Physical Exam   Full physical not done     Mentation and affect are fine    No tremor of speech or extremity    Patient has minimal pitting edema today        Diagnostic Test Results:  Labs reviewed in Epic        ASSESSMENT / PLAN:  (I83.93) Varicose veins of both lower extremities, unspecified whether complicated  (primary encounter diagnosis)  Comment: patient showed me the information about the proposed procedure from the [facility].  I advised getting a 2nd opinion.  Patient to schedule consult with Dr. Pichardo.   Plan: GENERAL SURG ADULT REFERRAL             (M17.11) Primary osteoarthritis of right knee  Comment: discussed treatments.  The stem cell injections are expensive and not covered by insurance.   Still questionable just how effective they are.   Plan: discussed pros/ cons.    (R42) Dizziness  Comment: symptoms resolved.  Dehydration was likely a factor.  Exacerbated by the fact he is on several blood pressure meds.   Plan: as above       I reviewed the patient's medications, allergies, medical history, family history, and social history.    Richi Jaramillo MD

## 2019-06-11 ENCOUNTER — TELEPHONE (OUTPATIENT)
Dept: FAMILY MEDICINE | Facility: CLINIC | Age: 69
End: 2019-06-11

## 2019-06-11 NOTE — TELEPHONE ENCOUNTER
Reason for Call:  Other - Patient Update    Detailed comments: Patient called and wanted to inform Dr. Jaramillo that he has a cold again due to his eye & nasal tubes. He has a prescription from his eye doctor for Keflex. Patient stated he is going to fill this prescription but he wanted to let Dr. Jaramillo know this. He stated if he has any questions, please call back to discuss.    Phone Number Patient can be reached at: Home number on file [phone] (home)    Best Time: Anytime    Can we leave a detailed message on this number? YES    Call taken on 6/11/2019 at 1:57 PM by Lorrie Watson

## 2019-06-21 ENCOUNTER — TELEPHONE (OUTPATIENT)
Dept: FAMILY MEDICINE | Facility: CLINIC | Age: 69
End: 2019-06-21

## 2019-06-21 DIAGNOSIS — J06.9 UPPER RESPIRATORY TRACT INFECTION, UNSPECIFIED TYPE: ICD-10-CM

## 2019-06-21 DIAGNOSIS — M79.10 MUSCLE PAIN: ICD-10-CM

## 2019-06-21 RX ORDER — CEPHALEXIN 500 MG/1
500 CAPSULE ORAL 3 TIMES DAILY
Qty: 21 CAPSULE | Refills: 0 | Status: SHIPPED | OUTPATIENT
Start: 2019-06-21 | End: 2019-09-13

## 2019-06-21 RX ORDER — METHOCARBAMOL 500 MG/1
TABLET, FILM COATED ORAL
Qty: 30 TABLET | Refills: 0 | Status: SHIPPED | OUTPATIENT
Start: 2019-06-21 | End: 2019-11-05

## 2019-06-21 NOTE — TELEPHONE ENCOUNTER
Notified patient of the message below per PCP.  Patient stated understanding and agreeable with the plan of care. Jenelle Mcqueen,PETERN,RN, [facility] Triage.

## 2019-06-21 NOTE — TELEPHONE ENCOUNTER
Called [phone] (home).  The message stated that the person you are trying to reach has a voicemail box that has not been set up yet.  Please try your call again later.    Jenelle Mcqueen RN [facility] Triage.

## 2019-06-21 NOTE — TELEPHONE ENCOUNTER
Patient is requesting another script for Keflex for URI/Sinus symptoms due from Nose tubes.  States he is just finishing a round of Keflex but will need another as drainage is not clear. He states you know all about eyes and nose tubes and increased infection risk.  Please see pended meds.  Thank you.  Rufina Silver RN

## 2019-06-21 NOTE — TELEPHONE ENCOUNTER
Reason for Call:  Medication or medication refill:    Do you use a [facility]?  Name of the pharmacy and phone number for the current request:  [facility] [address] [facility], [place] - [address]     Name of the medication requested: keflex    Other request: Patient states he had surgery and he is almost done with the first round of antibiotics, but he is needing  Second prescription.    Can we leave a detailed message on this number? YES    Phone number patient can be reached at: Home number on file [phone] (home)    Best Time: anytime    Call taken on 6/21/2019 at 10:48 AM by Mattie Burns

## 2019-08-02 ENCOUNTER — TELEPHONE (OUTPATIENT)
Dept: FAMILY MEDICINE | Facility: CLINIC | Age: 69
End: 2019-08-02

## 2019-08-02 DIAGNOSIS — Z88.9 HISTORY OF ALLERGIC REACTION: ICD-10-CM

## 2019-08-02 DIAGNOSIS — L29.9 ITCHING: Primary | ICD-10-CM

## 2019-08-02 RX ORDER — HYDROXYZINE HYDROCHLORIDE 25 MG/1
TABLET, FILM COATED ORAL
Qty: 20 TABLET | Refills: 1 | Status: SHIPPED | OUTPATIENT
Start: 2019-08-02 | End: 2019-11-05

## 2019-08-02 RX ORDER — EPINEPHRINE 0.3 MG/.3ML
0.3 INJECTION SUBCUTANEOUS PRN
Qty: 2 EACH | Refills: 1 | Status: SHIPPED | OUTPATIENT
Start: 2019-08-02 | End: 2020-07-09

## 2019-08-02 NOTE — TELEPHONE ENCOUNTER
Per last conversation with patient - patient's phone battery low, asked nurse to send text message. Informed via text message of Epi pen script cued up. Will await reply.      Tika Mistry RN  [facility]

## 2019-08-02 NOTE — TELEPHONE ENCOUNTER
Patient returned call - left voice mail is at [employer] will see if script is there for him.   Will need to notify to watch for sedation.    Nurse called patient and informed of possible sedation, Patient verbalized understanding and agreement with plan  States still itching like crazy. Did take a generic Benadryl after being stung. Right side of jaw became swollen and the muscle became hard, denies dyspnea or swallowing difficulty,  nurse advised an Epi pen in case stung again and if uses an epi pen needs to call 911.    Nurse advised patient to continue with an allergy medication like Claritin to avoid sleepiness instead of taking Benadryl with the new script for Hydroxyzine,  Patient verbalized understanding and agreement with plan      Consulted with Dr. Jaramillo and gave above information.     Tika Mistry RN  [facility]

## 2019-08-02 NOTE — TELEPHONE ENCOUNTER
I sent in prescription.  Watch for sedation on this.    Please inform patient.    Richi Jaramillo MD

## 2019-08-02 NOTE — TELEPHONE ENCOUNTER
Attempt # 1  Called patient at home [phone]  Was call answered?  No answer, mail box not set up, unable to leave voice mail.    Tika Mistry RN  [facility]

## 2019-08-02 NOTE — TELEPHONE ENCOUNTER
Patient responded to send the prescription for the Epi pen    Script sent and informed patient.        Tika Mistry RN  [facility]

## 2019-08-02 NOTE — TELEPHONE ENCOUNTER
Agree with advice    I did pend a prescription for the epi pen; good to have this on hand    Please inform patient and send in epi pen prescription if he agrees    Richi Jaramillo MD

## 2019-08-02 NOTE — TELEPHONE ENCOUNTER
Reason for Call:  Other call back and prescription    Detailed comments:  Patient was stung by a couple of bee's cheek,both arms swollen hoping can get script for itching.  [facility] in [place]    Phone Number Patient can be reached at: Home number on file [phone] (home)    Best Time:  asap    Can we leave a detailed message on this number? YES    Call taken on 8/2/2019 at 8:51 AM by Natalia Sainz

## 2019-08-02 NOTE — TELEPHONE ENCOUNTER
Routing to PCP to review and advise.    Pharmacy cued.        Tika Mistry RN  [facility]

## 2019-09-06 ENCOUNTER — TELEPHONE (OUTPATIENT)
Dept: FAMILY MEDICINE | Facility: CLINIC | Age: 69
End: 2019-09-06

## 2019-09-06 DIAGNOSIS — J01.90 ACUTE SINUSITIS, RECURRENCE NOT SPECIFIED, UNSPECIFIED LOCATION: Primary | ICD-10-CM

## 2019-09-06 RX ORDER — CEPHALEXIN 500 MG/1
500 CAPSULE ORAL 3 TIMES DAILY
Qty: 30 CAPSULE | Refills: 0 | Status: SHIPPED | OUTPATIENT
Start: 2019-09-06 | End: 2019-09-13

## 2019-09-06 NOTE — TELEPHONE ENCOUNTER
Patient submitted request.  Bad sinus symptoms again.  Will send in prescription.  Richi Jaramillo MD

## 2019-09-12 ENCOUNTER — TELEPHONE (OUTPATIENT)
Dept: FAMILY MEDICINE | Facility: CLINIC | Age: 69
End: 2019-09-12

## 2019-09-12 NOTE — TELEPHONE ENCOUNTER
Discussed symptom with Dr. Jaramillo     Stomach not feeling well lately, soft feces, green all the way through. Not eating black licorice, cannot thinks of anything eats that could cause this.   Has had a cold, has tubes in eyes and nose from surgery.  Below belt abdomen is uncomfortable.  Does have a temperature - feels a little warmer normal, does have cold symptoms.    Taking anti-histamine daily - not really helping.      Tika Mistry RN  [facility]

## 2019-09-12 NOTE — TELEPHONE ENCOUNTER
Reason for call:  Patient reporting a symptom    Symptom or request: Continues to be sick, feces is a brilliant green throughout, like chopped grass.      Duration (how long have symptoms been present): for a while now    Have you been treated for this before? No    Additional comments: Patient states he is very concerned and wondering if this could be meds?  He doesn't understand. Please have RN call him to discuss and advise.  Pt doesn't have voice mail so please don't try and leave a message (according to patient).    Phone Number patient can be reached at:  Home number on file [phone] (home)    Best Time:  anytime    Can we leave a detailed message on this number:  NO    Call taken on 9/12/2019 at 9:50 AM by Nadine Velasco

## 2019-09-12 NOTE — TELEPHONE ENCOUNTER
Per [facility] - please reschedule Friday for 9 am instead of 2 pm.    Attempt # 1  Called patient at home number.  Was call answered?  Yes, rescheduled to 9 am, patient asked if should bring in sample, nurse advised that might be a good idea, clean a container with soap and warm water, rinse well with hot water and let air dry.  Patient verbalized understanding and agreement with plan     Tika Mistry RN  [facility]

## 2019-09-13 ENCOUNTER — OFFICE VISIT (OUTPATIENT)
Dept: FAMILY MEDICINE | Facility: CLINIC | Age: 69
End: 2019-09-13
Payer: COMMERCIAL

## 2019-09-13 VITALS
SYSTOLIC BLOOD PRESSURE: 148 MMHG | HEART RATE: 59 BPM | OXYGEN SATURATION: 96 % | TEMPERATURE: 98.2 F | BODY MASS INDEX: 39.05 KG/M2 | WEIGHT: 280 LBS | DIASTOLIC BLOOD PRESSURE: 75 MMHG

## 2019-09-13 DIAGNOSIS — I10 HYPERTENSION GOAL BP (BLOOD PRESSURE) < 140/90: ICD-10-CM

## 2019-09-13 DIAGNOSIS — J01.90 ACUTE SINUSITIS WITH SYMPTOMS > 10 DAYS: Primary | ICD-10-CM

## 2019-09-13 DIAGNOSIS — R10.84 ABDOMINAL PAIN, GENERALIZED: ICD-10-CM

## 2019-09-13 DIAGNOSIS — K21.9 GASTROESOPHAGEAL REFLUX DISEASE, ESOPHAGITIS PRESENCE NOT SPECIFIED: ICD-10-CM

## 2019-09-13 DIAGNOSIS — R19.5 GREEN STOOL: ICD-10-CM

## 2019-09-13 DIAGNOSIS — E66.9 OBESITY, UNSPECIFIED OBESITY SEVERITY, UNSPECIFIED OBESITY TYPE: ICD-10-CM

## 2019-09-13 LAB
ALBUMIN SERPL-MCNC: 3.4 G/DL (ref 3.4–5)
ALP SERPL-CCNC: 84 U/L (ref 40–150)
ALT SERPL W P-5'-P-CCNC: 70 U/L (ref 0–70)
ANION GAP SERPL CALCULATED.3IONS-SCNC: 5 MMOL/L (ref 3–14)
AST SERPL W P-5'-P-CCNC: 52 U/L (ref 0–45)
BILIRUB SERPL-MCNC: 0.9 MG/DL (ref 0.2–1.3)
BUN SERPL-MCNC: 14 MG/DL (ref 7–30)
CALCIUM SERPL-MCNC: 8.8 MG/DL (ref 8.5–10.1)
CHLORIDE SERPL-SCNC: 105 MMOL/L (ref 94–109)
CO2 SERPL-SCNC: 30 MMOL/L (ref 20–32)
CREAT SERPL-MCNC: 0.88 MG/DL (ref 0.66–1.25)
CREAT UR-MCNC: 63 MG/DL
DIFFERENTIAL METHOD BLD: ABNORMAL
EOSINOPHIL # BLD AUTO: 0.1 10E9/L (ref 0–0.7)
EOSINOPHIL NFR BLD AUTO: 2 %
ERYTHROCYTE [DISTWIDTH] IN BLOOD BY AUTOMATED COUNT: 13.2 % (ref 10–15)
GFR SERPL CREATININE-BSD FRML MDRD: 88 ML/MIN/{1.73_M2}
GLUCOSE SERPL-MCNC: 240 MG/DL (ref 70–99)
HCT VFR BLD AUTO: 41.9 % (ref 40–53)
HGB BLD-MCNC: 13.8 G/DL (ref 13.3–17.7)
LIPASE SERPL-CCNC: 193 U/L (ref 73–393)
LYMPHOCYTES # BLD AUTO: 0.7 10E9/L (ref 0.8–5.3)
LYMPHOCYTES NFR BLD AUTO: 16 %
MCH RBC QN AUTO: 32.2 PG (ref 26.5–33)
MCHC RBC AUTO-ENTMCNC: 32.9 G/DL (ref 31.5–36.5)
MCV RBC AUTO: 98 FL (ref 78–100)
MICROALBUMIN UR-MCNC: 32 MG/L
MICROALBUMIN/CREAT UR: 51.28 MG/G CR (ref 0–17)
MONOCYTES # BLD AUTO: 0.4 10E9/L (ref 0–1.3)
MONOCYTES NFR BLD AUTO: 9 %
NEUTROPHILS # BLD AUTO: 3.2 10E9/L (ref 1.6–8.3)
NEUTROPHILS NFR BLD AUTO: 73 %
PLATELET # BLD AUTO: 66 10E9/L (ref 150–450)
PLATELET # BLD EST: ABNORMAL 10*3/UL
POTASSIUM SERPL-SCNC: 4.4 MMOL/L (ref 3.4–5.3)
PROT SERPL-MCNC: 7.1 G/DL (ref 6.8–8.8)
RBC # BLD AUTO: 4.29 10E12/L (ref 4.4–5.9)
RBC MORPH BLD: NORMAL
SODIUM SERPL-SCNC: 140 MMOL/L (ref 133–144)
WBC # BLD AUTO: 4.4 10E9/L (ref 4–11)

## 2019-09-13 PROCEDURE — 83690 ASSAY OF LIPASE: CPT | Performed by: FAMILY MEDICINE

## 2019-09-13 PROCEDURE — 99214 OFFICE O/P EST MOD 30 MIN: CPT | Performed by: FAMILY MEDICINE

## 2019-09-13 PROCEDURE — 82043 UR ALBUMIN QUANTITATIVE: CPT | Performed by: FAMILY MEDICINE

## 2019-09-13 PROCEDURE — 80053 COMPREHEN METABOLIC PANEL: CPT | Performed by: FAMILY MEDICINE

## 2019-09-13 PROCEDURE — 36415 COLL VENOUS BLD VENIPUNCTURE: CPT | Performed by: FAMILY MEDICINE

## 2019-09-13 PROCEDURE — 85025 COMPLETE CBC W/AUTO DIFF WBC: CPT | Performed by: FAMILY MEDICINE

## 2019-09-13 ASSESSMENT — PAIN SCALES - GENERAL: PAINLEVEL: SEVERE PAIN (7)

## 2019-09-13 NOTE — PROGRESS NOTES
Subjective     Gucci Logan is a 69 year old male who presents to clinic today for the following health issues:    HPI   ENT Symptoms             Symptoms: cc Present Absent Comment   Fever/Chills  x     Fatigue  x     Muscle Aches  x     Eye Irritation  x     Sneezing  x     Nasal Refugio/Drg  x     Sinus Pressure/Pain  x     Loss of smell  x  maybe   Dental pain  x  A little   Sore Throat  x  A little   Swollen Glands  x     Ear Pain/Fullness   x    Cough  x     Wheeze  x     Chest Pain  x  A little   Shortness of breath   x    Rash   x    Other         Symptom duration:  4 months    Symptom severity:  7/10   Treatments tried:  Claritin, benadryl    Contacts:  no                    Reviewed and updated as needed this visit by Provider         Review of Systems   ROS COMP:  Green stool    Not much green veggies     a few veggies not much    Pretty balanced diet    Bad cold type symptoms    Abdominal cramping    Dizzy and balance worse    Green stool started a few days ago    Consistent    Soft stool but not liquid    Started probiotic months ago    No vomiting    No bloody or black stools    No change diet/ exercise / etc    Less soda          Objective    There were no vitals taken for this visit.  There is no height or weight on file to calculate BMI.  Physical Exam   Constitutional: He is oriented to person, place, and time. He appears well-developed and well-nourished.   HENT:   Head: Normocephalic and atraumatic.   Eyes: Conjunctivae are normal.   Neck: Carotid bruit is not present.   Cardiovascular: Normal rate, regular rhythm, normal heart sounds and intact distal pulses.   Pulmonary/Chest: Effort normal and breath sounds normal. No respiratory distress.   Musculoskeletal: He exhibits edema (only trace).   Neurological: He is alert and oriented to person, place, and time. No cranial nerve deficit.   Psychiatric: He has a normal mood and affect. His speech is normal and behavior is normal.    abd soft  obese, no masses    No peritoneal signs    Patient is tender over right upper quadrant and left upper quadrant and epigastrium           ASSESSMENT / PLAN:  (J01.90) Acute sinusitis with symptoms > 10 days  (primary encounter diagnosis)  Comment: possible the large amount of mucus down back of throat which he swallows is contributing to green stool?  In any case, sinus symptoms worsening.  Go with 2nd line antibiotic.  Plan: amoxicillin-clavulanate (AUGMENTIN) 875-125 MG         tablet        Follow up prn symptoms     (R19.5) Green stool  Comment: overall patient not feeling bad.  No change in diet.  He showed me pictures on phone of his green stool.  Basically a dark olive green.    Plan: labs pending.   No red flag type symptoms.     (K21.9) Gastroesophageal reflux disease, esophagitis presence not specified  Comment: patient on ppi daily   Plan: continue     (I10) Hypertension goal BP (blood pressure) < 140/90  Comment: moderately high on recheck   Plan: Albumin Random Urine Quantitative with Creat         Ratio        Monitor     (R10.84) Abdominal pain, generalized  Comment: check labs   Plan: Comprehensive metabolic panel, CBC with         platelets differential, Lipase             (E66.9) Obesity, unspecified obesity severity, unspecified obesity type  Comment: chronic   Plan: keep working on healthy diet/exercise and wt loss       I reviewed the patient's medications, allergies, medical history, family history, and social history.    Richi Jaramillo MD

## 2019-09-13 NOTE — LETTER
[facility]   [address] [place]  [address]  [phone]                                   September 16, 2019    Gucci Logan  [address] [facility] [address]        Dear Gucci,    There is some protein in the urine, but the blood test for kidney function ( creatinine ) is normal.    The liver function tests like ALT and AST are better than last time.    The blood sugar is higher.  Keep working on healthy diet/exercise and weight loss as you are able.    Platelet count is lower this time.  We should recheck that in 2-3 months.    Let me know if the green stool problem not resolving.    Results for orders placed or performed in visit on 09/13/19   Albumin Random Urine Quantitative with Creat Ratio   Result Value Ref Range    Creatinine Urine 63 mg/dL    Albumin Urine mg/L 32 mg/L    Albumin Urine mg/g Cr 51.28 (H) 0 - 17 mg/g Cr   Comprehensive metabolic panel   Result Value Ref Range    Sodium 140 133 - 144 mmol/L    Potassium 4.4 3.4 - 5.3 mmol/L    Chloride 105 94 - 109 mmol/L    Carbon Dioxide 30 20 - 32 mmol/L    Anion Gap 5 3 - 14 mmol/L    Glucose 240 (H) 70 - 99 mg/dL    Urea Nitrogen 14 7 - 30 mg/dL    Creatinine 0.88 0.66 - 1.25 mg/dL    GFR Estimate 88 >60 mL/min/[1.73_m2]    GFR Estimate If Black >90 >60 mL/min/[1.73_m2]    Calcium 8.8 8.5 - 10.1 mg/dL    Bilirubin Total 0.9 0.2 - 1.3 mg/dL    Albumin 3.4 3.4 - 5.0 g/dL    Protein Total 7.1 6.8 - 8.8 g/dL    Alkaline Phosphatase 84 40 - 150 U/L    ALT 70 0 - 70 U/L    AST 52 (H) 0 - 45 U/L   CBC with platelets differential   Result Value Ref Range    WBC 4.4 4.0 - 11.0 10e9/L    RBC Count 4.29 (L) 4.4 - 5.9 10e12/L    Hemoglobin 13.8 13.3 - 17.7 g/dL    Hematocrit 41.9 40.0 - 53.0 %    MCV 98 78 - 100 fl    MCH 32.2 26.5 - 33.0 pg    MCHC 32.9 31.5 - 36.5 g/dL    RDW 13.2 10.0 - 15.0 %    Platelet Count 66 (L) 150 - 450 10e9/L    % Neutrophils 73.0 %    % Lymphocytes 16.0 %    % Monocytes 9.0 %     % Eosinophils 2.0 %    Absolute Neutrophil 3.2 1.6 - 8.3 10e9/L    Absolute Lymphocytes 0.7 (L) 0.8 - 5.3 10e9/L    Absolute Monocytes 0.4 0.0 - 1.3 10e9/L    Absolute Eosinophils 0.1 0.0 - 0.7 10e9/L    RBC Morphology Normal     Platelet Estimate       Automated count confirmed.  Platelet morphology is normal.    Diff Method Automated Method    Lipase   Result Value Ref Range    Lipase 193 73 - 393 U/L       If you have any questions please call the clinic at [phone]    Sincerely,    Richi Jaramillo MD  bmd

## 2019-09-13 NOTE — PATIENT INSTRUCTIONS
Stop cephalexin    Start augmentin    Stay well hydrated    Notify us of symptoms    We will send you lab results

## 2019-09-14 NOTE — RESULT ENCOUNTER NOTE
There is some protein in the urine, but the blood test for kidney function ( creatinine ) is normal.    The liver function tests like ALT and AST are better than last time.    The blood sugar is higher.  Keep working on healthy diet/exercise and weight loss as you are able.    Platelet count is lower this time.  We should recheck that in 2-3 months.    Let me know if the green stool problem not resolving.    Richi Jaramillo MD

## 2019-09-16 ENCOUNTER — TELEPHONE (OUTPATIENT)
Dept: FAMILY MEDICINE | Facility: CLINIC | Age: 69
End: 2019-09-16

## 2019-09-16 NOTE — TELEPHONE ENCOUNTER
Reason for Call:  Other     Detailed comments: Patient does not want lab results sent over mail, would like to  at clinic. Patient is also still dealing with dizzyness and would like a call back to discuss.     Phone Number Patient can be reached at: Home number on file [phone] (home)    Best Time: Anytime    Can we leave a detailed message on this number? YES    Call taken on 9/16/2019 at 7:39 AM by Maricruz Mcclain

## 2019-09-16 NOTE — TELEPHONE ENCOUNTER
Attempt # 1  Called patient at home number.  Was call answered? Yes, light headed and dizzy, more frequently and more intense.   Walked over to door and became dizzy and lightheaded.    States the green BM is resolving slowly now.    Discussed causes of dizziness, diet, exercise and weight loss.    Scheduled appointment for 10/22/2019 to follow up on the high PETER Mistry RN  [facility]

## 2019-09-25 ENCOUNTER — TELEPHONE (OUTPATIENT)
Dept: FAMILY MEDICINE | Facility: CLINIC | Age: 69
End: 2019-09-25

## 2019-09-25 DIAGNOSIS — J01.90 ACUTE SINUSITIS WITH SYMPTOMS > 10 DAYS: ICD-10-CM

## 2019-09-25 NOTE — TELEPHONE ENCOUNTER
Attempt # 1  Called patient at home number.  Was call answered?  Yes, finished last Amoxicillin last night and still having symptoms, runny nose, nasal mucous is green, chest pain (uncomfortable feeling when coughing but not when not coughing), like a hand on chest pushing down,night sweats, headache, sensitive to light, tubes in eyes watering, cheek bones hurting. Light green to dark green out of left nasal passage. Green BM has pretty much resolved.    Patient requesting a second dose of Amoxicillin.    Pharmacy cued.    States emailed the provider.    Tika Mistry RN  [facility]

## 2019-09-25 NOTE — TELEPHONE ENCOUNTER
Reason for Call:  Medication or medication refill:    Do you use a [facility]?  Name of the pharmacy and phone number for the current request:  [facility] [address] [place] [address], [place] - [address]    Name of the medication requested: amoxicillin-clavulanate (AUGMENTIN) 875-125 MG tablet    Other request: None    Can we leave a detailed message on this number? YES    Phone number patient can be reached at: Cell number on file:    Telephone Information:   Mobile [phone]       Best Time: Anytime    Call taken on 9/25/2019 at 10:07 AM by Maricruz Mcclain

## 2019-09-25 NOTE — TELEPHONE ENCOUNTER
Attempt # 1  Called patient at home [phone]  Was call answered?  Yes, relayed information of script sent. Patient verbalized understanding and agreement with plan and had no questions         Tika Mistry RN  [facility]

## 2019-09-30 DIAGNOSIS — I10 HYPERTENSION GOAL BP (BLOOD PRESSURE) < 140/90: ICD-10-CM

## 2019-09-30 NOTE — TELEPHONE ENCOUNTER
"Requested Prescriptions   Pending Prescriptions Disp Refills     hydrochlorothiazide (HYDRODIURIL) 25 MG tablet [Pharmacy Med Name: HYDROCHLOROT 25MG   TAB] 90 tablet 1     Sig: TAKE 1 TABLET BY MOUTH ONCE DAILY   Last Written Prescription Date:  2/5/19  Last Fill Quantity: 90,  # refills: 1   Last office visit: 9/13/2019 with prescribing provider:     Future Office Visit:   Next 5 appointments (look out 90 days)    Oct 22, 2019  9:00 AM CDT  Office Visit with Richi Jar[facility]illo MD  [facility] ([facility] [address] [place] [id number] [phone]             Diuretics (Including Combos) Protocol Failed - 9/30/2019  9:30 AM        Failed - Blood pressure under 140/90 in past 12 months     BP Readings from Last 3 Encounters:   09/13/19 (!) 148/75   05/31/19 134/65   05/03/19 144/70                 Passed - Recent (12 mo) or future (30 days) visit within the authorizing provider's specialty     Patient has had an office visit with the authorizing provider or a provider within the authorizing providers department within the previous 12 mos or has a future within next 30 days. See \"Patient Info\" tab in inbasket, or \"Choose Columns\" in Meds & Orders section of the refill encounter.              Passed - Medication is active on med list        Passed - Patient is age 18 or older        Passed - Normal serum creatinine on file in past 12 months     Recent Labs   Lab Test 09/13/19  0953   CR 0.88              Passed - Normal serum potassium on file in past 12 months     Recent Labs   Lab Test 09/13/19  0953   POTASSIUM 4.4                    Passed - Normal serum sodium on file in past 12 months     Recent Labs   Lab Test 09/13/19  0953                   " 10-Oct-2017 20:51

## 2019-10-02 RX ORDER — HYDROCHLOROTHIAZIDE 25 MG/1
TABLET ORAL
Qty: 30 TABLET | Refills: 0 | Status: SHIPPED | OUTPATIENT
Start: 2019-10-02 | End: 2019-10-14

## 2019-10-02 NOTE — TELEPHONE ENCOUNTER
Patient has upcoming appointment. 30 day thu given.  Patient can request larger quantity or more refills at upcoming appointment.    Shelbi Bruno RN

## 2019-10-09 ENCOUNTER — TELEPHONE (OUTPATIENT)
Dept: FAMILY MEDICINE | Facility: CLINIC | Age: 69
End: 2019-10-09

## 2019-10-09 NOTE — TELEPHONE ENCOUNTER
Attempt # 1  Called patient at home number.[phone]   Was call answered?  Yes, patient states thinks is from the tubes in eyes, they get clogged and things back up.  Has appointment with eye doctor Dr. Krause - wants to have the tubes removed.    Is on Cephalexin ordered by Dr. Krause - on cephalexin about 3 courses 500 mg tid.     Night sweats, nose stuffy, gums are receding, BM not soft serve anymore and back to normal color.    Patient requesting to reschedule the 10/22 appointment to 11/5/2019   States will send a note after seeing eye doctor and maybe feel better and can eat right before the testing on 11/5.      Tika Mistry RN  [facility]

## 2019-10-09 NOTE — TELEPHONE ENCOUNTER
Reason for call:  Patient reporting a symptom    Symptom or request: not feeling well/symptoms are not getting better pt was seen 9/13/19    Duration (how long have symptoms been present): on going     Have you been treated for this before? Yes    Phone Number patient can be reached at:  Home number on file [phone] (home)    Best Time:  any    Can we leave a detailed message on this number:  YES    Call taken on 10/9/2019 at 9:56 AM by Katie Nassar

## 2019-10-14 DIAGNOSIS — I10 HYPERTENSION GOAL BP (BLOOD PRESSURE) < 140/90: ICD-10-CM

## 2019-10-14 NOTE — TELEPHONE ENCOUNTER
"Requested Prescriptions   Pending Prescriptions Disp Refills     hydrochlorothiazide (HYDRODIURIL) 25 MG tablet [Pharmacy Med Name: HYDROCHLOROT 25MG   TAB] 30 tablet 0     Sig: TAKE 1 TABLET BY MOUTH ONCE DAILY   Last Written Prescription Date:  10-2-19  Last Fill Quantity: 30,  # refills: 0   Last office visit: 9/13/2019 with prescribing provider:  9-13-19   Future Office Visit:   Next 5 appointments (look out 90 days)    Nov 05, 2019  9:20 AM CST  Office Visit with Richi Jaramillo MD  [facility] ([facility]) [address] [facility] [id number] [phone]             Diuretics (Including Combos) Protocol Failed - 10/14/2019 10:45 AM        Failed - Blood pressure under 140/90 in past 12 months     BP Readings from Last 3 Encounters:   09/13/19 (!) 148/75   05/31/19 134/65   05/03/19 144/70                 Passed - Recent (12 mo) or future (30 days) visit within the authorizing provider's specialty     Patient has had an office visit with the authorizing provider or a provider within the authorizing providers department within the previous 12 mos or has a future within next 30 days. See \"Patient Info\" tab in inbasket, or \"Choose Columns\" in Meds & Orders section of the refill encounter.              Passed - Medication is active on med list        Passed - Patient is age 18 or older        Passed - Normal serum creatinine on file in past 12 months     Recent Labs   Lab Test 09/13/19  0953   CR 0.88              Passed - Normal serum potassium on file in past 12 months     Recent Labs   Lab Test 09/13/19  0953   POTASSIUM 4.4                    Passed - Normal serum sodium on file in past 12 months     Recent Labs   Lab Test 09/13/19  0953                   "

## 2019-10-16 RX ORDER — HYDROCHLOROTHIAZIDE 25 MG/1
TABLET ORAL
Qty: 30 TABLET | Refills: 0 | Status: SHIPPED | OUTPATIENT
Start: 2019-10-16 | End: 2019-10-25

## 2019-10-23 ENCOUNTER — NURSE TRIAGE (OUTPATIENT)
Dept: FAMILY MEDICINE | Facility: CLINIC | Age: 69
End: 2019-10-23

## 2019-10-23 NOTE — TELEPHONE ENCOUNTER
Called patient and informed him that Dr Jaramillo can see him between patients Friday. Patient stated he may go to [place] to be evaluated. Nurse advised if he decides to go to the ER to please let us know to cancel his appointment for Friday and he can follow up with Dr Jaramillo at his 11/5/19 appointment.     Shelbi Bruno RN

## 2019-10-23 NOTE — TELEPHONE ENCOUNTER
Reason for call:  Patient reporting a symptom    Symptom or request: Patient calling stating he is feeling lightheaded and dizzy. Patient has a left sided headache. He states this has been going on for awhile but it was very prevalent this morning. Call transferred to Sudhir GUAJARDO.      Duration (how long have symptoms been present): see above    Have you been treated for this before? No    Additional comments: Patient uses [employer]    Phone Number patient can be reached at:  Home number on file [phone] (home)    Best Time:  any    Can we leave a detailed message on this number:  YES    Call taken on 10/23/2019 at 9:56 AM by Sarah Eckert

## 2019-10-23 NOTE — TELEPHONE ENCOUNTER
"Nurse Triage SBAR    Situation: Gucci Logan provider review of care advice given per protocol. Patient states he is not wanting to see other providers - only PCP. Has appt scheduled but not until 11/5 for other reasons.  He is not inclined to go to urgent care as he states these symptoms have been assessed previously in ER and they didn't find anything of concern.    Background: Dizziness; \"listing\" to the left occasionally upon standing. Symptoms pass and no other symptoms associated at the time of event but does report night sweats recently.    (See information below for more triage details.)    Recommendation: Routing to provider for recommendation on office visit with you if you can fit him in tomorrow or Friday?    Protocol Recommended Disposition: Urgent office follow-up appointment or Urgent Care        Additional Information    Negative: Shock suspected (e.g., cold/pale/clammy skin, too weak to stand, low BP, rapid pulse)    Negative: Difficult to awaken or acting confused (e.g., disoriented, slurred speech)    Negative: Fainted, and still feels dizzy afterwards    Negative: Severe difficulty breathing (e.g., struggling for each breath, speaks in single words)    Negative: Overdose (accidental or intentional) of medications    Negative: New neurologic deficit that is present now: * Weakness of the face, arm, or leg on one side of the body * Numbness of the face, arm, or leg on one side of the body * Loss of speech or garbled speech    Negative: Heart beating < 50 beats per minute OR > 140 beats per minute    Negative: Sounds like a life-threatening emergency to the triager    Negative: Chest pain    Negative: Rectal bleeding, bloody stool, or tarry-black stool    Negative: Vomiting is the main symptom    Negative: Diarrhea is the main symptom    Negative: Headache is the main symptom    Negative: Heat exhaustion suspected (i.e., dehydration from heat exposure)    Negative: Patient states that he/she " "is having an anxiety/panic attack    Negative: SEVERE dizziness (e.g., unable to stand, requires support to walk, feels like passing out now)    Negative: Severe headache    Negative: Extra heart beats OR irregular heart beating (i.e., 'palpitations')    Negative: Difficulty breathing    Negative: Drinking very little and has signs of dehydration (e.g., no urine > 12 hours, very dry mouth, very lightheaded)    Negative: Follows bleeding (e.g., stomach, rectum, vagina) (Exception: became dizzy from sight of small amount blood)    Negative: Patient sounds very sick or weak to the triager    Negative: Lightheadedness (dizziness) present now, after 2 hours of rest and fluids    Negative: Spinning or tilting sensation (vertigo) present now    Negative: Fever > 103 F (39.4 C)    Negative: Fever > 100.0 F (37.8 C) and has diabetes mellitus or a weak immune system (e.g., HIV positive, cancer chemotherapy, organ transplant, splenectomy, chronic steroids)    Negative: Vomiting occurs with dizziness    Negative: Patient wants to be seen    Taking a medicine that could cause dizziness (e.g., blood pressure medications, diuretics)    Diabetes    Answer Assessment - Initial Assessment Questions  1. DESCRIPTION: \"Describe your dizziness.\"      Feels like when you get off a merry go round as a child; listing to the left  2. LIGHTHEADED: \"Do you feel lightheaded?\" (e.g., somewhat faint, woozy, weak upon standing)      Not currently  3. VERTIGO: \"Do you feel like either you or the room is spinning or tilting?\" (i.e. vertigo)      Not currently; but this morning stood up and felt like he was listing to the left  4. SEVERITY: \"How bad is it?\"  \"Do you feel like you are going to faint?\" \"Can you stand and walk?\"    - MILD - walking normally    - MODERATE - interferes with normal activities (e.g., work, school)     - SEVERE - unable to stand, requires support to walk, feels like passing out now.       I can walk; I don't feel cece  5. " "ONSET:  \"When did the dizziness begin?\"      Off and on but worse this morning  6. AGGRAVATING FACTORS: \"Does anything make it worse?\" (e.g., standing, change in head position)      Standing makes it worse  7. HEART RATE: \"Can you tell me your heart rate?\" \"How many beats in 15 seconds?\"  (Note: not all patients can do this)        Fit bit readin  8. CAUSE: \"What do you think is causing the dizziness?\"      Had tubes removed, procedures for tear ducts  9. RECURRENT SYMPTOM: \"Have you had dizziness before?\" If so, ask: \"When was the last time?\" \"What happened that time?\"      Yes; comes and goes  10. OTHER SYMPTOMS: \"Do you have any other symptoms?\" (e.g., fever, chest pain, vomiting, diarrhea, bleeding)        Night sweats  11. PREGNANCY: \"Is there any chance you are pregnant?\" \"When was your last menstrual period?\"        N/A    Protocols used: DIZZINESS-A-OH      "

## 2019-10-25 ENCOUNTER — OFFICE VISIT (OUTPATIENT)
Dept: FAMILY MEDICINE | Facility: CLINIC | Age: 69
End: 2019-10-25
Payer: COMMERCIAL

## 2019-10-25 VITALS
OXYGEN SATURATION: 96 % | TEMPERATURE: 97.8 F | SYSTOLIC BLOOD PRESSURE: 160 MMHG | WEIGHT: 275.13 LBS | DIASTOLIC BLOOD PRESSURE: 82 MMHG | HEART RATE: 68 BPM | BODY MASS INDEX: 38.37 KG/M2

## 2019-10-25 DIAGNOSIS — R42 DIZZINESS: ICD-10-CM

## 2019-10-25 DIAGNOSIS — I10 HYPERTENSION GOAL BP (BLOOD PRESSURE) < 140/90: Primary | ICD-10-CM

## 2019-10-25 DIAGNOSIS — F41.9 ANXIETY: ICD-10-CM

## 2019-10-25 PROCEDURE — 99214 OFFICE O/P EST MOD 30 MIN: CPT | Performed by: FAMILY MEDICINE

## 2019-10-25 PROCEDURE — 99207 C PAF COMPLETED  NO CHARGE: CPT | Performed by: FAMILY MEDICINE

## 2019-10-25 RX ORDER — AMLODIPINE BESYLATE 5 MG/1
5 TABLET ORAL DAILY
Qty: 90 TABLET | Refills: 0 | Status: SHIPPED | OUTPATIENT
Start: 2019-10-25 | End: 2019-11-05

## 2019-10-25 RX ORDER — HYDROCHLOROTHIAZIDE 50 MG/1
50 TABLET ORAL DAILY
Qty: 30 TABLET | Refills: 1 | Status: SHIPPED | OUTPATIENT
Start: 2019-10-25 | End: 2019-11-05

## 2019-10-25 ASSESSMENT — PAIN SCALES - GENERAL: PAINLEVEL: NO PAIN (0)

## 2019-10-25 NOTE — PROGRESS NOTES
Subjective     Gucci Logan is a 69 year old male who presents to clinic today for the following health issues:    HPI   Dizziness and weakness  none            Reviewed and updated as needed this visit by Provider         Review of Systems   ROS COMP:  Feels unsteady when stands up    Meanders some when walkng    Cranial pressure on left side    exercisng some upper body before going to bed    150ish over 60s at home    Bowel and bladder control fine    Left sided headache present most of time    Taking adult mvi gummy     Omega 3 6 9    Vit D3      Objective    BP (!) 147/74 (BP Location: Right arm, Patient Position: Chair, Cuff Size: Adult Large)   Pulse 68   Temp 97.8  F (36.6  C) (Oral)   Wt 124.8 kg (275 lb 2 oz)   SpO2 96%   BMI 38.37 kg/m    Body mass index is 38.37 kg/m .  Physical Exam  Constitutional:       Appearance: He is well-developed.   HENT:      Head: Normocephalic and atraumatic.      Right Ear: Tympanic membrane, ear canal and external ear normal.      Left Ear: Tympanic membrane, ear canal and external ear normal.      Nose: Nose normal.      Mouth/Throat:      Mouth: Mucous membranes are moist.      Pharynx: Oropharynx is clear.   Eyes:      Extraocular Movements: Extraocular movements intact.      Conjunctiva/sclera: Conjunctivae normal.      Pupils: Pupils are equal, round, and reactive to light.   Neck:      Vascular: No carotid bruit.   Cardiovascular:      Rate and Rhythm: Normal rate and regular rhythm.      Heart sounds: Normal heart sounds.   Pulmonary:      Effort: Pulmonary effort is normal. No respiratory distress.      Breath sounds: Normal breath sounds.   Neurological:      Mental Status: He is alert and oriented to person, place, and time.      Cranial Nerves: No cranial nerve deficit.   Psychiatric:         Speech: Speech normal.         Behavior: Behavior normal.         Sensation/strength in extremities at baseline.  Romberg, standing on one leg test, and heel toe  walk all a little off.  Finger nose test okay.  Cranial nerves normal.        Diagnostic Test Results:  Labs reviewed in Epic           ASSESSMENT / PLAN:  (I10) Hypertension goal BP (blood pressure) < 140/90  (primary encounter diagnosis)  Comment: of note, patient thinks maybe symptoms began after we backed off on a blood pressure med due to swelling ( amlodipine ).  Blood pressure quite high here, even on recheck.    Plan: hydrochlorothiazide (HYDRODIURIL) 50 MG tablet,        amLODIPine (NORVASC) 5 MG tablet        Plan increase hydrochlorothiazide to 50.  If symptoms and blood pressure not improving, then also can go back to 5 mg amlodipine.  See us in a few weeks, sooner if needed.  Be seen promptly if symptoms acutely worsen.     (R42) Dizziness  Comment: discussed in detail.  Of note he had CT head earlier this year, unremarkable.   Plan: monitor     (F41.9) Anxiety  Comment: lots of ongoing stress.  Plan: monitor       I reviewed the patient's medications, allergies, medical history, family history, and social history.    Richi Jaramillo MD

## 2019-10-25 NOTE — PATIENT INSTRUCTIONS
Increase hydrochlorothiazide to 50 mg daily    After a few days, if blood pressure and symptoms are not better, than on the amlodipine go back to 5 mg daily    See us in a few weeks, will check labs then    Call sooner if needed    Be seen promptly if symptoms acutely worsen

## 2019-11-01 ENCOUNTER — ALLIED HEALTH/NURSE VISIT (OUTPATIENT)
Dept: FAMILY MEDICINE | Facility: CLINIC | Age: 69
End: 2019-11-01
Payer: COMMERCIAL

## 2019-11-01 VITALS — SYSTOLIC BLOOD PRESSURE: 150 MMHG | DIASTOLIC BLOOD PRESSURE: 73 MMHG

## 2019-11-01 DIAGNOSIS — Z01.30 BP CHECK: Primary | ICD-10-CM

## 2019-11-01 PROCEDURE — 99207 ZZC NO CHARGE NURSE ONLY: CPT | Performed by: FAMILY MEDICINE

## 2019-11-01 NOTE — PROGRESS NOTES
Gucci Logan was evaluated at [facility] on November 1, 2019 at which time his blood pressure was:    BP Readings from Last 3 Encounters:   11/01/19 (!) 150/73   10/25/19 (!) 160/82   09/13/19 (!) 148/75     Pulse Readings from Last 3 Encounters:   10/25/19 68   09/13/19 59   05/31/19 62       Reviewed lifestyle modifications for blood pressure control and reduction: including making healthy food choices, managing weight, getting regular exercise, smoking cessation, reducing alcohol consumption, monitoring blood pressure regularly.     Symptoms: None    BP Goal:< 140/90 mmHg    BP Assessment:  BP too high    Potential Reasons for BP too high: NA - Not applicable    BP Follow-Up Plan: Referral to PCP    Recommendation to Provider: patient did say they got their blood pressure checked [facility] last night at 7 pm with reading of 136/74    Note completed by: Erin Powers, PharmD  [facility] Pharmacist    [facility] pharmacist on behalf of: [facility].

## 2019-11-04 ENCOUNTER — ALLIED HEALTH/NURSE VISIT (OUTPATIENT)
Dept: FAMILY MEDICINE | Facility: CLINIC | Age: 69
End: 2019-11-04
Payer: COMMERCIAL

## 2019-11-04 VITALS — DIASTOLIC BLOOD PRESSURE: 72 MMHG | SYSTOLIC BLOOD PRESSURE: 151 MMHG

## 2019-11-04 DIAGNOSIS — Z01.30 BP CHECK: Primary | ICD-10-CM

## 2019-11-04 PROCEDURE — 99207 ZZC NO CHARGE NURSE ONLY: CPT | Performed by: FAMILY MEDICINE

## 2019-11-04 NOTE — PROGRESS NOTES
Gucci Logan was evaluated at [facility] on November 4, 2019 at which time his blood pressure was:    BP Readings from Last 3 Encounters:   11/04/19 (!) 151/72   11/01/19 (!) 150/73   10/25/19 (!) 160/82     Pulse Readings from Last 3 Encounters:   10/25/19 68   09/13/19 59   05/31/19 62       Reviewed lifestyle modifications for blood pressure control and reduction: including making healthy food choices, managing weight, getting regular exercise, smoking cessation, reducing alcohol consumption, monitoring blood pressure regularly.     Symptoms: None    BP Goal:< 140/90 mmHg    BP Assessment:  BP too high    Potential Reasons for BP too high: NA - Not applicable    BP Follow-Up Plan: Referral to PCP    Recommendation to Provider: Gucci came in today to check his bp, his meter at home has been reading in the 160s/70s so he wanted to see what our reading would be.  He did not want to wait for a 2nd reading as he knew his bp would be high.  He stated that his bp med doses have changed recently.  He stated he has an upcoming appt to f/u.      Note completed by: Key Diallo  Pharm.D.  [facility]  [place] Pharmacist  On Behalf of [facility] [place]

## 2019-11-05 ENCOUNTER — OFFICE VISIT (OUTPATIENT)
Dept: FAMILY MEDICINE | Facility: CLINIC | Age: 69
End: 2019-11-05
Payer: COMMERCIAL

## 2019-11-05 VITALS
TEMPERATURE: 97.4 F | OXYGEN SATURATION: 95 % | DIASTOLIC BLOOD PRESSURE: 68 MMHG | SYSTOLIC BLOOD PRESSURE: 137 MMHG | HEART RATE: 61 BPM | BODY MASS INDEX: 38.22 KG/M2 | WEIGHT: 274 LBS

## 2019-11-05 DIAGNOSIS — K21.9 GASTROESOPHAGEAL REFLUX DISEASE, ESOPHAGITIS PRESENCE NOT SPECIFIED: ICD-10-CM

## 2019-11-05 DIAGNOSIS — I10 HYPERTENSION GOAL BP (BLOOD PRESSURE) < 140/90: Primary | ICD-10-CM

## 2019-11-05 DIAGNOSIS — E78.5 HYPERLIPIDEMIA LDL GOAL <100: ICD-10-CM

## 2019-11-05 DIAGNOSIS — R53.83 FATIGUE, UNSPECIFIED TYPE: ICD-10-CM

## 2019-11-05 DIAGNOSIS — Z12.5 SCREENING FOR PROSTATE CANCER: ICD-10-CM

## 2019-11-05 DIAGNOSIS — E66.9 OBESITY, UNSPECIFIED OBESITY SEVERITY, UNSPECIFIED OBESITY TYPE: ICD-10-CM

## 2019-11-05 DIAGNOSIS — R73.01 IMPAIRED FASTING GLUCOSE: ICD-10-CM

## 2019-11-05 DIAGNOSIS — J01.90 ACUTE SINUSITIS WITH SYMPTOMS > 10 DAYS: ICD-10-CM

## 2019-11-05 LAB
ALBUMIN SERPL-MCNC: 3.5 G/DL (ref 3.4–5)
ALP SERPL-CCNC: 93 U/L (ref 40–150)
ALT SERPL W P-5'-P-CCNC: 64 U/L (ref 0–70)
ANION GAP SERPL CALCULATED.3IONS-SCNC: 7 MMOL/L (ref 3–14)
AST SERPL W P-5'-P-CCNC: 46 U/L (ref 0–45)
BILIRUB SERPL-MCNC: 1.2 MG/DL (ref 0.2–1.3)
BUN SERPL-MCNC: 13 MG/DL (ref 7–30)
CALCIUM SERPL-MCNC: 9.1 MG/DL (ref 8.5–10.1)
CHLORIDE SERPL-SCNC: 106 MMOL/L (ref 94–109)
CHOLEST SERPL-MCNC: 168 MG/DL
CO2 SERPL-SCNC: 28 MMOL/L (ref 20–32)
CREAT SERPL-MCNC: 0.91 MG/DL (ref 0.66–1.25)
DIFFERENTIAL METHOD BLD: ABNORMAL
EOSINOPHIL # BLD AUTO: 0.1 10E9/L (ref 0–0.7)
EOSINOPHIL NFR BLD AUTO: 2 %
ERYTHROCYTE [DISTWIDTH] IN BLOOD BY AUTOMATED COUNT: 13.2 % (ref 10–15)
GFR SERPL CREATININE-BSD FRML MDRD: 86 ML/MIN/{1.73_M2}
GLUCOSE SERPL-MCNC: 185 MG/DL (ref 70–99)
HBA1C MFR BLD: 8 % (ref 0–5.6)
HCT VFR BLD AUTO: 43.7 % (ref 40–53)
HDLC SERPL-MCNC: 41 MG/DL
HGB BLD-MCNC: 14.5 G/DL (ref 13.3–17.7)
LDLC SERPL CALC-MCNC: 86 MG/DL
LYMPHOCYTES # BLD AUTO: 0.9 10E9/L (ref 0.8–5.3)
LYMPHOCYTES NFR BLD AUTO: 17 %
MCH RBC QN AUTO: 32.3 PG (ref 26.5–33)
MCHC RBC AUTO-ENTMCNC: 33.2 G/DL (ref 31.5–36.5)
MCV RBC AUTO: 97 FL (ref 78–100)
MONOCYTES # BLD AUTO: 0.5 10E9/L (ref 0–1.3)
MONOCYTES NFR BLD AUTO: 9 %
NEUTROPHILS # BLD AUTO: 3.9 10E9/L (ref 1.6–8.3)
NEUTROPHILS NFR BLD AUTO: 72 %
NONHDLC SERPL-MCNC: 127 MG/DL
PLATELET # BLD AUTO: 81 10E9/L (ref 150–450)
PLATELET # BLD EST: ABNORMAL 10*3/UL
POTASSIUM SERPL-SCNC: 4.2 MMOL/L (ref 3.4–5.3)
PROT SERPL-MCNC: 7.4 G/DL (ref 6.8–8.8)
PSA SERPL-ACNC: 3.24 UG/L (ref 0–4)
RBC # BLD AUTO: 4.49 10E12/L (ref 4.4–5.9)
RBC MORPH BLD: NORMAL
SODIUM SERPL-SCNC: 141 MMOL/L (ref 133–144)
TRIGL SERPL-MCNC: 205 MG/DL
TSH SERPL DL<=0.005 MIU/L-ACNC: 3.67 MU/L (ref 0.4–4)
WBC # BLD AUTO: 5.4 10E9/L (ref 4–11)

## 2019-11-05 PROCEDURE — 83036 HEMOGLOBIN GLYCOSYLATED A1C: CPT | Performed by: FAMILY MEDICINE

## 2019-11-05 PROCEDURE — 84443 ASSAY THYROID STIM HORMONE: CPT | Performed by: FAMILY MEDICINE

## 2019-11-05 PROCEDURE — G0103 PSA SCREENING: HCPCS | Performed by: FAMILY MEDICINE

## 2019-11-05 PROCEDURE — 85025 COMPLETE CBC W/AUTO DIFF WBC: CPT | Performed by: FAMILY MEDICINE

## 2019-11-05 PROCEDURE — 80061 LIPID PANEL: CPT | Performed by: FAMILY MEDICINE

## 2019-11-05 PROCEDURE — 80053 COMPREHEN METABOLIC PANEL: CPT | Performed by: FAMILY MEDICINE

## 2019-11-05 PROCEDURE — 36415 COLL VENOUS BLD VENIPUNCTURE: CPT | Performed by: FAMILY MEDICINE

## 2019-11-05 PROCEDURE — 99214 OFFICE O/P EST MOD 30 MIN: CPT | Performed by: FAMILY MEDICINE

## 2019-11-05 RX ORDER — HYDROCHLOROTHIAZIDE 50 MG/1
50 TABLET ORAL DAILY
Qty: 90 TABLET | Refills: 1 | Status: SHIPPED | OUTPATIENT
Start: 2019-11-05 | End: 2020-03-26

## 2019-11-05 RX ORDER — AMLODIPINE BESYLATE 5 MG/1
5 TABLET ORAL DAILY
Qty: 90 TABLET | Refills: 0 | Status: SHIPPED | OUTPATIENT
Start: 2019-11-05 | End: 2019-12-31

## 2019-11-05 ASSESSMENT — PAIN SCALES - GENERAL: PAINLEVEL: NO PAIN (0)

## 2019-11-05 NOTE — PROGRESS NOTES
Subjective     Gucci Logan is a 69 year old male who presents to clinic today for the following health issues:    HPI   Follow up  none            Reviewed and updated as needed this visit by Provider         Review of Systems   ROS COMP:  Lots of stresses     Breathing better at night recently    Since his surgery     Recently taking 2.5 of the amlodipine, not 5    Taking 50 hydrochlorothiazide    Losartan 100    Metoprolol 50 daily     Not taking muscle relaxers    Drinking lots of water  Still some nightsweats    Fasting some the last couple weeks    Staying hydrated, mainly water  Coffee in am    No soda     May go away for several weeks to a month          Objective    BP (!) 149/74 (BP Location: Left arm, Patient Position: Chair, Cuff Size: Adult Regular)   Pulse 61   Temp 97.4  F (36.3  C) (Oral)   Wt 124.3 kg (274 lb)   SpO2 95%   BMI 38.22 kg/m    Body mass index is 38.22 kg/m .  Physical Exam  Constitutional:       Appearance: He is well-developed.   HENT:      Head: Normocephalic and atraumatic.   Eyes:      Conjunctiva/sclera: Conjunctivae normal.   Neck:      Vascular: No carotid bruit.   Cardiovascular:      Rate and Rhythm: Normal rate and regular rhythm.      Heart sounds: Normal heart sounds.   Pulmonary:      Effort: Pulmonary effort is normal. No respiratory distress.      Breath sounds: Normal breath sounds.   Neurological:      Mental Status: He is alert and oriented to person, place, and time.      Cranial Nerves: No cranial nerve deficit.   Psychiatric:         Speech: Speech normal.         Behavior: Behavior normal.             Diagnostic Test Results:  Labs reviewed in Epic         ASSESSMENT / PLAN:  (I10) Hypertension goal BP (blood pressure) < 140/90  (primary encounter diagnosis)  Comment: on recheck blood pressure okay   Plan: amLODIPine (NORVASC) 5 MG tablet,         hydrochlorothiazide (HYDRODIURIL) 50 MG tablet        Refill meds     (J01.90) Acute sinusitis with  symptoms > 10 days  Comment: patient going on long trip, prudent to have prescription to take along with  Plan: amoxicillin-clavulanate (AUGMENTIN) 875-125 MG         tablet        Use prn     (Z12.5) Screening for prostate cancer  Comment: check psa   Plan: Prostate spec antigen screen             (R53.83) Fatigue, unspecified type  Comment: check labs   Plan: TSH with free T4 reflex, CBC with platelets         differential             (E78.5) Hyperlipidemia LDL goal <100  Comment: fasting today  Plan: Lipid panel reflex to direct LDL Fasting,         Comprehensive metabolic panel             (R73.01) Impaired fasting glucose  Comment: check  Plan: Hemoglobin A1c             (E66.9) Obesity, unspecified obesity severity, unspecified obesity type  Comment: ongoing   Plan: keep working on healthy diet/exercise and wt loss     (K21.9) Gastroesophageal reflux disease, esophagitis presence not specified  Comment: stable on ppi   Plan: no change       I reviewed the patient's medications, allergies, medical history, family history, and social history.    Richi Jaramillo MD

## 2019-11-05 NOTE — PATIENT INSTRUCTIONS
Increase amlodipine to 5 mg daily    Other blood pressure meds as is    Take prescription for antibiotic on trip    We will send you lab results

## 2019-11-05 NOTE — LETTER
[facility]   [address] [place]  [address]  [phone]                                   November 8, 2019    Gucci Logan  [address] [facility] [address]        Dear Gucci,    The diabetes test ( hemoglobin a1c ) is higher than last time.  Typically we would start a medication at this level.    I advise you work real hard on healthy diet/ exercise/ weight loss and then see us in 2-3 months.  If not better tat that time, then we would likely start a diabetes medication.    Other labs are okay/ stable.    Results for orders placed or performed in visit on 11/05/19   Hemoglobin A1c     Status: Abnormal   Result Value Ref Range    Hemoglobin A1C 8.0 (H) 0 - 5.6 %   Lipid panel reflex to direct LDL Fasting     Status: Abnormal   Result Value Ref Range    Cholesterol 168 <200 mg/dL    Triglycerides 205 (H) <150 mg/dL    HDL Cholesterol 41 >39 mg/dL    LDL Cholesterol Calculated 86 <100 mg/dL    Non HDL Cholesterol 127 <130 mg/dL   Comprehensive metabolic panel     Status: Abnormal   Result Value Ref Range    Sodium 141 133 - 144 mmol/L    Potassium 4.2 3.4 - 5.3 mmol/L    Chloride 106 94 - 109 mmol/L    Carbon Dioxide 28 20 - 32 mmol/L    Anion Gap 7 3 - 14 mmol/L    Glucose 185 (H) 70 - 99 mg/dL    Urea Nitrogen 13 7 - 30 mg/dL    Creatinine 0.91 0.66 - 1.25 mg/dL    GFR Estimate 86 >60 mL/min/[1.73_m2]    GFR Estimate If Black >90 >60 mL/min/[1.73_m2]    Calcium 9.1 8.5 - 10.1 mg/dL    Bilirubin Total 1.2 0.2 - 1.3 mg/dL    Albumin 3.5 3.4 - 5.0 g/dL    Protein Total 7.4 6.8 - 8.8 g/dL    Alkaline Phosphatase 93 40 - 150 U/L    ALT 64 0 - 70 U/L    AST 46 (H) 0 - 45 U/L   TSH with free T4 reflex     Status: None   Result Value Ref Range    TSH 3.67 0.40 - 4.00 mU/L   Prostate spec antigen screen     Status: None   Result Value Ref Range    PSA 3.24 0 - 4 ug/L   CBC with platelets differential     Status: Abnormal   Result Value Ref Range    WBC 5.4 4.0 - 11.0  10e9/L    RBC Count 4.49 4.4 - 5.9 10e12/L    Hemoglobin 14.5 13.3 - 17.7 g/dL    Hematocrit 43.7 40.0 - 53.0 %    MCV 97 78 - 100 fl    MCH 32.3 26.5 - 33.0 pg    MCHC 33.2 31.5 - 36.5 g/dL    RDW 13.2 10.0 - 15.0 %    Platelet Count 81 (L) 150 - 450 10e9/L    RBC Morphology Normal     Platelet Estimate       Automated count confirmed.  Platelet morphology is normal.    Diff Method Automated Method     % Neutrophils 72.0 %    % Lymphocytes 17.0 %    % Monocytes 9.0 %    % Eosinophils 2.0 %    Absolute Neutrophil 3.9 1.6 - 8.3 10e9/L    Absolute Lymphocytes 0.9 0.8 - 5.3 10e9/L    Absolute Monocytes 0.5 0.0 - 1.3 10e9/L    Absolute Eosinophils 0.1 0.0 - 0.7 10e9/L       If you have any questions please call the clinic at [phone]    Sincerely,    Richi Jaramillo MD  [facility]

## 2019-11-07 ENCOUNTER — TELEPHONE (OUTPATIENT)
Dept: FAMILY MEDICINE | Facility: CLINIC | Age: 69
End: 2019-11-07

## 2019-11-07 NOTE — TELEPHONE ENCOUNTER
Reason for Call:  Medication or medication refill:    Do you use a [facility]?  Name of the pharmacy and phone number for the current request:       [facility] [address] [place], [place] [address], [place] - [address]      Name of the medication requested: hydrochlorothiazide (HYDRODIURIL) 50 MG tablet    Other request: PT states that the pharmacy did not receive the refill request that was sent over on 11/5, he is requesting we re send the Rx     Can we leave a detailed message on this number? YES    Phone number patient can be reached at: Home number on file [phone] (home)    Best Time: any    Call taken on 11/7/2019 at 8:52 AM by Heidi Lucas

## 2019-11-07 NOTE — TELEPHONE ENCOUNTER
Called [facility]'s pharmacy and they verified they do have remaining refills for the patient and will get them ready for him now. Attempted to call the patient at the number provided, no voicemail set up, unable to leave a message.     Shelbi Bruno RN

## 2019-11-08 NOTE — RESULT ENCOUNTER NOTE
The diabetes test ( hemoglobin a1c ) is higher than last time.  Typically we would start a medication at this level.    I advise you work real hard on healthy diet/ exercise/ weight loss and then see us in 2-3 months.  If not better tat that time, then we would likely start a diabetes medication.    Other labs are okay/ stable.    Richi Jaramillo MD

## 2019-12-12 ENCOUNTER — TELEPHONE (OUTPATIENT)
Dept: FAMILY MEDICINE | Facility: CLINIC | Age: 69
End: 2019-12-12

## 2019-12-12 DIAGNOSIS — J01.90 ACUTE SINUSITIS WITH SYMPTOMS > 10 DAYS: ICD-10-CM

## 2019-12-12 NOTE — TELEPHONE ENCOUNTER
Attempt # 1  Called patient at home number.  Was call answered?  Yes, night sweats, before it was mixed sinus and chest and now is pretty heavy in the chest part, no chest pain, can tell bronchial have stuff in there. Sinus congestion is about the same but the chest got worse, the tear duct surgery did not work, no wheezing, coughing up a little bit of phlegm, color of phlegm is yellow/green, in right lung.  Congestion or feels weird in right chest about the size of base ball around nipple area.     Patient requesting something to knock this stuff out.          Tika Mistry RN  [facility]

## 2019-12-12 NOTE — TELEPHONE ENCOUNTER
Reason for Call:  Medication or medication refill:    Do you use a [facility]?  Name of the pharmacy and phone number for the current request:  [facility] [place], [place] [phone]    Name of the medication requested: antibiotic    Other request: Patient states that he still has the bronchial and sinusitis problems and he feels he needs another antibiotic to help him get rid of it.  Canelo also states that his phone  so don't call him back for at least an hour after this message.  He would like Cortney Villela, to give him a call back as well.    Can we leave a detailed message on this number? YES    Phone number patient can be reached at: Home number on file [phone] (home)    Best Time: after 3:00 pm today     Call taken on 2019 at 1:58 PM by Nadine Velasco

## 2019-12-13 NOTE — TELEPHONE ENCOUNTER
RN called patient and relayed provider's message. Patient verbalized understanding.     Tamara James RN, BSN, PHN  [facility]

## 2019-12-13 NOTE — TELEPHONE ENCOUNTER
I sent in one more round of augmentin to pharmacy.  Follow up in clinic if symptoms not resolving.    Please inform patient.    Richi Jaramillo MD     NICU Daily Progress Note     Patient: Abdoulaye Coronado   MRN: [id number]        YOB: 2018  Admit date/time:   2019 11:15 AM     GA:  Gestational Age: 30w4d     CGA: 35w 3d    Birth Wt:  2 lb 11.7 oz (1240 g)       Interval Changes: Weaned Vapotherm to 1 LPM, tolerating well with occasional tachypnea as previously.  Now offering breast and bottle feedings per cues. Weight + 46 grams overnight.    There were no labs or images in the last 24 hours. Nursing documentation reviewed including pain assessment.    Medications:  Scheduled meds:   • ferrous sulfate (elemental)  2 mg/kg Oral Q24H   • cholecalciferol  200 Units Per NG tube Q24H     PRN meds:     cyclopentolate-phenylephrine 1 drop PRN   proparacaine 1 drop PRN       Health Maintenance:   Vitals 24 Hour Range Most Recent Value   Temperature Temp  Min: 98.6 °F (37 °C)  Max: 99 °F (37.2 °C) 99 °F (37.2 °C)   Pulse Pulse  Min: 139  Max: 172 145   Resp rate Resp  Min: 14  Max: 91 31   Non-Invasive BP BP  Min: 79/36  Max: 79/36 79/36   MAP MAP (mmHg)  Min: 52  Max: 52 52   Pulse Ox SpO2  Min: 91 %  Max: 100 % 96 %   FiO2  FiO2 (%)  Min: 21 %  Max: 21 % 21 % (19)     Physical exam:  General:  infant in open crib. NG in place. On HFNC On continuous cardio-respiratory and pulse oximeter monitoring.  Skin: pink  HEENT: normocephalic and anterior fontonelle soft and flat.Eyes clear.    CV: The precordium is quiet. Rhythm is regular. No murmur. The pulses are equal and palpable. Capillary refill < 3 seconds.  Lungs: Clear to auscultation.  Equal aeration bilaterally. Easy respiratory effort  Abdomen: Soft and Nontender.  Extremities: Moves all four extremities.  Equal muscle bulk.  Neurologic: quiet,alert Tone normal for gestational age. Suck present    Respiratory:   24 hour range Most recent value   Resp rate Resp  Min: 14  Max: 91 31   Pulse Ox SpO2  Min: 91 %  Max: 100 % 96 %   FiO2  FiO2 (%)  Min: 21 %  Max: 21 % 21 % (19)        Oscillator Settings Last Value    Mean      Delta P      Hertz      FiO2 21 % (08/02/19 0719) FiO2 (%)  Min: 21 %  Max: 21 %   Pulse Oximetry 96 % SpO2  Min: 91 %  Max: 100 %         Last Apnea/Bradycardia:   59 (07/31/19 0705) and intervention: Self limiting (07/31/19 0705)  Number of Apnea/Bradycardia's in previous 24h:  0  Number of Apnea/Bradycardia's requiring stimulation in previous 24h:  0    Stable  on current respiratory support. No episodes of apnea/bradycardia.  PLAN: Continue Vapotherm at 1 LPM, consider discontinue tomorrow.    Cardiovascular:   24 hour range Most recent value   Pulse Pulse  Min: 139  Max: 172 145   Non-Invasive BP BP  Min: 79/36  Max: 79/36 79/36   MAP MAP (mmHg)  Min: 52  Max: 52 52     No murmur. Normotensive and hemodynamically appropriate    F/E/N/GI/:  Intake/Output       08/01 0700 - 08/02 0659 08/02 0700 - 08/03 0659    P.O. (mL/kg) 19 (8.85) 0 (0)    NG/GT (mL/kg) 301 (140.13) 40 (18.62)    Total Intake(mL/kg) 320 (148.98) 40 (18.62)    Net +320 +40          Urine Occurrence 8 x 1 x    Stool Occurrence 4 x 1 x    Unmeasured Breast Milk Occurrence 2 x 1 x        Total Calories= 119 kcal/kg/day  Last stool: 1 (08/02/19 0930) Stool Amount: Small  Stool Appearance: Loose  Stool Color: Yellow, Brown  UOP: appropriate.    Feedings:   Mother desires breastfeeding and is pumping. Adequate EBM supply available.  Breast feeding with gavage supplementation   Breast fed X 2 and Nippled 6 %.    Weight change since birth: 73% at 34 days of life  Weight last 4 values: Weight    07/30/19 0030 07/31/19 0030 08/01/19 0330 08/02/19 0000   Weight: (!) 2012 g (!) 2064 g (!) 2102 g (!) 2148 g     Last recorded OFC: 31 cm (12.21\") (07/29/19 0030)  Weight change: Weight gain Appropriate.   PLAN: Increase total feedings to 160 mL/kg/day.  Continue Vitamin D    ID:    24 Hour Range Most Recent Value   Temperature Temp  Min: 98.6 °F (37 °C)  Max: 99 °F (37.2 °C) 99 °F (37.2 °C)     Well appearing  infant. No current issues.    HEME:  Maternal blood type: Information for the patient's mother:  Rosa Coronado [[id number]]   O NEGATIVE    Mónica test:  Lab Results   Component Value Date    DATANTIIGG NEGATIVE 2019       Infant Blood Type:  A POSITIVE       HGB (g/dL)   Date Value   2019     HCT (%)   Date Value   2019    No components found for: RETIC   TOTAL BILIRUBIN   Date Value Ref Range Status   2019 2.0 - 6.0 mg/dL Final   2019 (H) 2.0 - 6.0 mg/dL Final        Last H/H Consistent with normal  decline..   On daily Fe supplementation    Neuro:  No current neurological concerns.  OT/PT consulted.     Ophth:  Last exam :  Exam with ROP Incomplete Development, Zone II, Bilaterally.   Follow up at 8/14 week interval.    Social:  Mother will be updated daily.  Social service consult is in place to address psychosocial needs associated with NICU admission.    Assessment:  Baby Girl Rosa Coronado is a former Gestational Age: 30w4d infant now 34 days old with a corrected gestational age of 35w 3d.  Active Problems:    RDS (respiratory distress syndrome in the )    Prematurity, birth weight 1,000-1,249 grams, with 30 completed weeks of gestation    Immature retina    Feeding difficulties in     Anemia of prematurity  Resolved Problems:    Hypoglycemia,     Thrombocytopenia (CMS/HCC)    Neutropenia, transient,     Hyperglycemia    Transitory electrolyte imbalance in     Slow feeding in         Discharge checklist to be completed/reviewed prior to discharge:   Hepatitis B immunization:   Immunization History   Administered Date(s) Administered   • Hep B, adolescent or pediatric 2019       [place] state screen: Normal on    CHD Screening:   To be completed prior to discharge   Hearing Screen:   To be completed prior to discharge  Car Seat Test:        To be completed prior to discharge if qualifies      Patient  Status - CHOOSE ONE OF THE FOLLOWING:  []  Critical Care - Patient remains critical due to (check all that apply):   Life threatening deterioration in patient’s condition:   [] Respiratory failure   [] CNS failure   [] Circulatory failure   [] Shock   [] Renal failure   [] Hepatic failure   [] Metabolic failure   [] Vent dependent   [] Other        [x] Intensive Care (wt) - Patient receiving intensive care due to:   Monitoring for:   [x] Cardiac          [x] Respiratory   [x] Oxygen   [x] Laboratory              [x] Heat maintenance   [x] Continuous and / or frequent vital signs    [x] Enteral and/or parenteral nutritional adjustments   [x] Constant observation by the health care team      Patient seen and medical management discussed with CHEYANNE Pham 2019 10:37 AM       I reviewed the services provided by the [facility] and also personally performed and documented my own examination and plan of care.    Jose Angel Hernandez MD 2019 12:15 PM

## 2019-12-16 ENCOUNTER — TELEPHONE (OUTPATIENT)
Dept: FAMILY MEDICINE | Facility: CLINIC | Age: 69
End: 2019-12-16

## 2019-12-16 NOTE — TELEPHONE ENCOUNTER
Attempt #   Called patient at home number.[phone]  Was call answered?  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 Has noticed cigarette smoke, still feels like internal shaking like cold but doesn't feel cold. Smells burnt pot roast states this is what meth smells like.  Patient wondering if can do a lab test to test for drugs?      Patient afraid is getting a contact high from other tenants.         Tika Mistry RN  [facility]

## 2019-12-16 NOTE — TELEPHONE ENCOUNTER
I called and discussed in detail with patient.  No testing needed.    Follow up in clinic prn symptoms.    Richi Jaramillo MD

## 2019-12-16 NOTE — TELEPHONE ENCOUNTER
Reason for Call:  Other     Detailed comments: Patient would like a call back asap with a personal concern. Patient would not leave a message.     Phone Number Patient can be reached at: Cell number on file:    Telephone Information:   Mobile [phone]       Best Time: Anytime    Can we leave a detailed message on this number? YES    Call taken on 12/16/2019 at 10:06 AM by Maricruz Mcclain

## 2019-12-23 ENCOUNTER — TELEPHONE (OUTPATIENT)
Dept: FAMILY MEDICINE | Facility: CLINIC | Age: 69
End: 2019-12-23

## 2019-12-23 ENCOUNTER — OFFICE VISIT (OUTPATIENT)
Dept: FAMILY MEDICINE | Facility: CLINIC | Age: 69
End: 2019-12-23
Payer: COMMERCIAL

## 2019-12-23 ENCOUNTER — ANCILLARY PROCEDURE (OUTPATIENT)
Dept: GENERAL RADIOLOGY | Facility: CLINIC | Age: 69
End: 2019-12-23
Attending: FAMILY MEDICINE
Payer: COMMERCIAL

## 2019-12-23 VITALS
HEART RATE: 69 BPM | BODY MASS INDEX: 38.24 KG/M2 | SYSTOLIC BLOOD PRESSURE: 138 MMHG | TEMPERATURE: 97.7 F | DIASTOLIC BLOOD PRESSURE: 71 MMHG | OXYGEN SATURATION: 97 % | WEIGHT: 274.19 LBS

## 2019-12-23 DIAGNOSIS — R05.9 COUGH: ICD-10-CM

## 2019-12-23 DIAGNOSIS — I10 HYPERTENSION GOAL BP (BLOOD PRESSURE) < 140/90: ICD-10-CM

## 2019-12-23 DIAGNOSIS — R05.9 COUGH: Primary | ICD-10-CM

## 2019-12-23 DIAGNOSIS — R07.81 PLEURITIC CHEST PAIN: ICD-10-CM

## 2019-12-23 PROCEDURE — 71046 X-RAY EXAM CHEST 2 VIEWS: CPT

## 2019-12-23 PROCEDURE — 99213 OFFICE O/P EST LOW 20 MIN: CPT | Performed by: FAMILY MEDICINE

## 2019-12-23 ASSESSMENT — PAIN SCALES - GENERAL: PAINLEVEL: NO PAIN (0)

## 2019-12-23 NOTE — TELEPHONE ENCOUNTER
Attempt #   Called patient at home [phone]  Was call answered?  Yes, still not feeling better, ABX runs out today.      Scheduled at 1:40 pm today with PCP.          Tika Mistry RN  [facility]

## 2019-12-23 NOTE — TELEPHONE ENCOUNTER
Routing to PCP to review and advise.    Do you want to work him in today?        Tika Mistry RN  [facility]

## 2019-12-23 NOTE — TELEPHONE ENCOUNTER
Reason for Call:  Same Day Appointment, Requested Provider:  Richi Jaramillo MD    PCP: Richi Jaramillo    Reason for visit: cold and other symptoms - last antibiotic    Duration of symptoms: about a year and a half    Have you been treated for this in the past? Yes    Additional comments: Please have Tika call  Him to see if he can be worked in for Dr. Jaramillo.    Can we leave a detailed message on this number? YES    Phone number patient can be reached at: Cell number on file:    Telephone Information:   Mobile [phone]       Best Time:    Call taken on 12/23/2019 at 8:02 AM by Nadine Velasco

## 2019-12-23 NOTE — PROGRESS NOTES
Subjective     Gucci Logan is a 69 year old male who presents to clinic today for the following health issues:    HPI   Cold symptoms patient took his last augmentin this morning               Reviewed and updated as needed this visit by Provider         Review of Systems   ROS COMP: several courses of antibiotics, not resolving    Still phlegm from chest  One time had a bit of blood in it          Objective    /71 (BP Location: Left arm, Patient Position: Chair, Cuff Size: Adult Regular)   Pulse 69   Temp 97.7  F (36.5  C) (Oral)   Wt 124.4 kg (274 lb 3 oz)   SpO2 97%   BMI 38.24 kg/m    Body mass index is 38.24 kg/m .  Physical Exam  Constitutional:       Appearance: He is well-developed.   HENT:      Head: Normocephalic and atraumatic.      Right Ear: Tympanic membrane, ear canal and external ear normal.      Left Ear: Tympanic membrane, ear canal and external ear normal.      Nose: Nose normal.      Mouth/Throat:      Mouth: Mucous membranes are moist.      Pharynx: Oropharynx is clear.   Eyes:      Conjunctiva/sclera: Conjunctivae normal.   Neck:      Vascular: No carotid bruit.   Cardiovascular:      Rate and Rhythm: Normal rate and regular rhythm.      Heart sounds: Normal heart sounds.   Pulmonary:      Effort: Pulmonary effort is normal. No respiratory distress.      Breath sounds: Normal breath sounds.   Abdominal:      Palpations: Abdomen is soft.      Tenderness: There is no abdominal tenderness.   Neurological:      Mental Status: He is alert and oriented to person, place, and time.      Cranial Nerves: No cranial nerve deficit.   Psychiatric:         Speech: Speech normal.         Behavior: Behavior normal.        No sinus/ submandib tenderness  Slight edema both legs  Radial pulses okay  No chest wall or back pain       Diagnostic Test Results:  Labs reviewed in Epic           cxr done, no sig change from early this year    ASSESSMENT / PLAN:  (R05) Cough  (primary encounter  diagnosis)  Comment: discussed in detail with patient.  He  Has  Been through several rounds of antibiotics.  On ppi.  Could try over the counter loratadine daily but prudent to see pulmonary md.  Patient  To call and schedule.   Plan: XR Chest 2 Views, PULMONARY MEDICINE REFERRAL             (R07.81) Pleuritic chest pain  Comment: as above   Plan: PULMONARY MEDICINE REFERRAL             Be seen promptly if symptoms acutely worsen     (I10) Hypertension goal BP (blood pressure) < 140/90  Comment: at goal barely   Plan: keep other meds as is       I reviewed the patient's medications, allergies, medical history, family history, and social history.    Richi Jaramillo MD

## 2019-12-23 NOTE — PATIENT INSTRUCTIONS
Stay well hydrated    Could take over the counter loratadine/ claritin one daily    Schedule with pulmonary specialist

## 2019-12-30 DIAGNOSIS — I10 HYPERTENSION GOAL BP (BLOOD PRESSURE) < 140/90: ICD-10-CM

## 2019-12-30 NOTE — TELEPHONE ENCOUNTER
"Requested Prescriptions   Pending Prescriptions Disp Refills     amLODIPine (NORVASC) 5 MG tablet [Pharmacy Med Name: amLODIPine Besylate 5 MG Oral Tablet]  0     Sig: TAKE 1 TABLET BY MOUTH ONCE DAILY   Last Written Prescription Date:  11/5/19  Last Fill Quantity: 90,  # refills: 0   Last office visit: 12/23/2019 with prescribing provider:     Future Office Visit:        Calcium Channel Blockers Protocol  Passed - 12/30/2019 10:14 AM        Passed - Blood pressure under 140/90 in past 12 months     BP Readings from Last 3 Encounters:   12/23/19 138/71   11/05/19 137/68   11/04/19 (!) 151/72                 Passed - Recent (12 mo) or future (30 days) visit within the authorizing provider's specialty     Patient has had an office visit with the authorizing provider or a provider within the authorizing providers department within the previous 12 mos or has a future within next 30 days. See \"Patient Info\" tab in inbasket, or \"Choose Columns\" in Meds & Orders section of the refill encounter.              Passed - Medication is active on med list        Passed - Patient is age 18 or older        Passed - Normal serum creatinine on file in past 12 months     Recent Labs   Lab Test 11/05/19  1009   CR 0.91             losartan (COZAAR) 100 MG tablet [Pharmacy Med Name: Losartan Potassium 100 MG Oral Tablet]  0     Sig: TAKE 1 TABLET BY MOUTH ONCE DAILY   Last Written Prescription Date:  10/15/19  Last Fill Quantity: 90,  # refills: 0   Last office visit: 12/23/2019 with prescribing provider:     Future Office Visit:        Angiotensin-II Receptors Passed - 12/30/2019 10:14 AM        Passed - Last blood pressure under 140/90 in past 12 months     BP Readings from Last 3 Encounters:   12/23/19 138/71   11/05/19 137/68   11/04/19 (!) 151/72                 Passed - Recent (12 mo) or future (30 days) visit within the authorizing provider's specialty     Patient has had an office visit with the authorizing provider or a " "provider within the authorizing providers department within the previous 12 mos or has a future within next 30 days. See \"Patient Info\" tab in inbasket, or \"Choose Columns\" in Meds & Orders section of the refill encounter.              Passed - Medication is active on med list        Passed - Patient is age 18 or older        Passed - Normal serum creatinine on file in past 12 months     Recent Labs   Lab Test 11/05/19  1009   CR 0.91             Passed - Normal serum potassium on file in past 12 months     Recent Labs   Lab Test 11/05/19  1009   POTASSIUM 4.2                    metoprolol succinate ER (TOPROL-XL) 50 MG 24 hr tablet [Pharmacy Med Name: Metoprolol Succinate ER 50 MG Oral Tablet Extended Release 24 Hour]  0     Sig: TAKE 1 TABLET BY MOUTH ONCE DAILY   Last Written Prescription Date:  10/11/19  Last Fill Quantity: 90,  # refills: 0   Last office visit: 12/23/2019 with prescribing provider:     Future Office Visit:        Beta-Blockers Protocol Passed - 12/30/2019 10:14 AM        Passed - Blood pressure under 140/90 in past 12 months     BP Readings from Last 3 Encounters:   12/23/19 138/71   11/05/19 137/68   11/04/19 (!) 151/72                 Passed - Patient is age 6 or older        Passed - Recent (12 mo) or future (30 days) visit within the authorizing provider's specialty     Patient has had an office visit with the authorizing provider or a provider within the authorizing providers department within the previous 12 mos or has a future within next 30 days. See \"Patient Info\" tab in inbasket, or \"Choose Columns\" in Meds & Orders section of the refill encounter.              Passed - Medication is active on med list          "

## 2019-12-31 RX ORDER — METOPROLOL SUCCINATE 50 MG/1
TABLET, EXTENDED RELEASE ORAL
Qty: 90 TABLET | Refills: 0 | Status: SHIPPED | OUTPATIENT
Start: 2019-12-31 | End: 2020-02-20

## 2019-12-31 RX ORDER — AMLODIPINE BESYLATE 5 MG/1
TABLET ORAL
Qty: 90 TABLET | Refills: 0 | Status: SHIPPED | OUTPATIENT
Start: 2019-12-31 | End: 2020-03-06

## 2019-12-31 RX ORDER — LOSARTAN POTASSIUM 100 MG/1
TABLET ORAL
Qty: 90 TABLET | Refills: 0 | Status: SHIPPED | OUTPATIENT
Start: 2019-12-31 | End: 2020-03-19

## 2019-12-31 NOTE — TELEPHONE ENCOUNTER
Prescription approved per [facility] Refill Protocol.    Tamara James, RN, BSN, PHN  [facility]

## 2020-01-06 ENCOUNTER — TELEPHONE (OUTPATIENT)
Dept: FAMILY MEDICINE | Facility: CLINIC | Age: 70
End: 2020-01-06

## 2020-01-06 NOTE — TELEPHONE ENCOUNTER
Reason for call:  Patient reporting a symptom    Symptom or request: chest soreness     Duration (how long have symptoms been present):     Have you been treated for this before?     Additional comments: patient states he hs been having chest soreness, not pain, more like a muscle pain, he is wondering if he should go in to an urgent care or wait for an appointment with PCP.    Phone Number patient can be reached at:  Home number on file [phone] (home)    Best Time:  Anytime     Can we leave a detailed message on this number:  YES    Call taken on 1/6/2020 at 12:24 PM by Nelly Enriquez

## 2020-01-06 NOTE — TELEPHONE ENCOUNTER
Attempt #   Called patient at home number.  Was call answered?  Yes, Fell around [place], still having rib pain, pain increases with deep breath, lower ribs on right side - tender to touch, coughing causes increase of pain.  Patient states will just go to urgent care.    Patient wondering if should see pulmonology for the test because is not able to exercise for 6 minutes, does not want to pay for a test that cannot do the whole thing. Pulmonary breathing test is 10 minutes of walking.     Above test was ordered prior to the fall.     One opening 20 minutes for Tuesday - scheduled patient.    No SOB or dyspnea, advised to go to UC/ER if dyspnea/SOB occurs.  Patient verbalized understanding and agreement with ghulam Mistry RN  [facility]

## 2020-01-07 ENCOUNTER — OFFICE VISIT (OUTPATIENT)
Dept: FAMILY MEDICINE | Facility: CLINIC | Age: 70
End: 2020-01-07
Payer: COMMERCIAL

## 2020-01-07 ENCOUNTER — ANCILLARY PROCEDURE (OUTPATIENT)
Dept: GENERAL RADIOLOGY | Facility: CLINIC | Age: 70
End: 2020-01-07
Attending: FAMILY MEDICINE
Payer: COMMERCIAL

## 2020-01-07 VITALS
WEIGHT: 274 LBS | HEIGHT: 71 IN | TEMPERATURE: 97.8 F | SYSTOLIC BLOOD PRESSURE: 167 MMHG | BODY MASS INDEX: 38.36 KG/M2 | DIASTOLIC BLOOD PRESSURE: 65 MMHG | HEART RATE: 88 BPM | OXYGEN SATURATION: 98 %

## 2020-01-07 DIAGNOSIS — R06.00 DYSPNEA, UNSPECIFIED TYPE: ICD-10-CM

## 2020-01-07 DIAGNOSIS — R73.01 IMPAIRED FASTING GLUCOSE: ICD-10-CM

## 2020-01-07 DIAGNOSIS — R07.81 RIB PAIN: Primary | ICD-10-CM

## 2020-01-07 DIAGNOSIS — R07.81 RIB PAIN: ICD-10-CM

## 2020-01-07 LAB — HBA1C MFR BLD: 8 % (ref 0–5.6)

## 2020-01-07 PROCEDURE — 83036 HEMOGLOBIN GLYCOSYLATED A1C: CPT | Performed by: FAMILY MEDICINE

## 2020-01-07 PROCEDURE — 36415 COLL VENOUS BLD VENIPUNCTURE: CPT | Performed by: FAMILY MEDICINE

## 2020-01-07 PROCEDURE — 71101 X-RAY EXAM UNILAT RIBS/CHEST: CPT | Mod: RT

## 2020-01-07 PROCEDURE — 99213 OFFICE O/P EST LOW 20 MIN: CPT | Performed by: FAMILY MEDICINE

## 2020-01-07 ASSESSMENT — PAIN SCALES - GENERAL: PAINLEVEL: SEVERE PAIN (7)

## 2020-01-07 ASSESSMENT — MIFFLIN-ST. JEOR: SCORE: 2034.99

## 2020-01-07 NOTE — PROGRESS NOTES
"Subjective     Gucci Logan is a 69 year old male who presents to clinic today for the following health issues:    HPI   Had a fall on the 25th of December and since then is having chest wall pain and side pain               Reviewed and updated as needed this visit by Provider         Review of Systems   ROS COMP:  Fell about 2 weeks ago      Objective    BP (!) 167/65 (BP Location: Left arm, Patient Position: Chair, Cuff Size: Adult Regular)   Pulse 88   Temp 97.8  F (36.6  C) (Oral)   Ht 1.803 m (5' 11\")   Wt 124.3 kg (274 lb)   SpO2 98%   BMI 38.22 kg/m    Body mass index is 38.22 kg/m .  Physical Exam   Full physical not done     Mentation and affect are fine    No tremor of speech or extremity    Heart regular     Lungs clear all fields     Patient point  To lower right ribs anterolateral and lateral when asked where  Pain is; some  Tenderness on palpation there    No swelling/ bruising/ discoloration    abd soft, obese, nontender        Diagnostic Test Results:  Labs reviewed in Epic         ASSESSMENT / PLAN:  (R07.81) Rib pain  (primary encounter diagnosis)  Comment: no fracture/ dislocation seen   Activity as tolerated  Follow up as needed based on symptoms   Plan: XR Ribs & Chest Right G/E 3 Views             (R73.01) Impaired fasting glucose  Comment: patient wanted this rechecked; unchanged at 8.0  Plan: Hemoglobin A1c        Work hard on diet/ exercise/ wt loss and then we can recheck in about 3months    (R06.00) Dyspnea, unspecified type  Comment: patient will call pulmonary md office again to schedule the  pft's and the consult  Plan: as  Above        I reviewed the patient's medications, allergies, medical history, family history, and social history.    Richi Jaramillo MD            "

## 2020-01-07 NOTE — PATIENT INSTRUCTIONS
Increase walking/ exercise  As  Able    Stretching/ range of motion for flank/back    Call to schedule pulmonary function tests and consult    Recheck  Hemoglobin a1c in 3months

## 2020-01-21 ENCOUNTER — TELEPHONE (OUTPATIENT)
Dept: FAMILY MEDICINE | Facility: CLINIC | Age: 70
End: 2020-01-21

## 2020-01-21 DIAGNOSIS — R06.00 DYSPNEA, UNSPECIFIED TYPE: Primary | ICD-10-CM

## 2020-01-21 NOTE — TELEPHONE ENCOUNTER
Attempt # 1  Called patient at home number.[phone]   Was call answered? Yes, pulmonary wants to do a lung capacity test, states is looking for somebody to figure out what is going on, does not want to see heart doctor or pulmonology, maybe see an internist?  States thinks the pulmonology tests might be a waste of his time, nurse explained pulmonologist needs those tests to be able diagnose his issues. Gave phone number for pulmonology to patient.                Tika Mistry RN  [facility]
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
## 2020-01-21 NOTE — TELEPHONE ENCOUNTER
Reason for Call:  Other call back    Detailed comments:  Patient has some questions about getting different doctor then pulmonary.    Phone Number Patient can be reached at: Home number on file [phone] (home)    Best Time:  Anytime     Can we leave a detailed message on this number? YES    Call taken on 1/21/2020 at 9:06 AM by Natalia Sainz

## 2020-01-23 ENCOUNTER — TRANSFERRED RECORDS (OUTPATIENT)
Dept: HEALTH INFORMATION MANAGEMENT | Facility: CLINIC | Age: 70
End: 2020-01-23

## 2020-01-23 NOTE — TELEPHONE ENCOUNTER
I called and discussed in detail. He has pulmonary referral coming up.  I did the order.    See us soon.  He is being evaluated at [place] emergency room currently.    Richi Jaramillo MD

## 2020-01-24 DIAGNOSIS — Z76.89 HEALTH CARE HOME: Primary | ICD-10-CM

## 2020-01-27 ENCOUNTER — PATIENT OUTREACH (OUTPATIENT)
Dept: CARE COORDINATION | Facility: CLINIC | Age: 70
End: 2020-01-27

## 2020-01-27 NOTE — PROGRESS NOTES
Clinic Care Coordination Contact  [facility]/Voicemail       Clinical Data: Care Coordinator Outreach  Outreach attempted x 1.  No voicemail set up for messages.  Plan: Care Coordinator will send care coordination introduction letter with care coordinator contact information and explanation of care coordination services via mail. Care Coordinator will try to reach patient again in 1-2 business days.        Guicho Joiner MSN, RN, PHN, CCM   Primary Care Clinical RN Care Coordinator  [facility]  1/27/2020   12:11 PM  [email or web address]  Office: [phone]

## 2020-01-27 NOTE — LETTER
[facility] CARE COORDINATION  [address] [facility] [address]    January 27, 2020    Gucci Logan  [address] [facility] [address]      Dear Gucci,    I am a clinic care coordinator who works with iRchi Jaramillo MD at [facility]. I recently tried to call and was unable to reach you. I wanted to introduce myself and provide you with my contact information so that you can call me with questions or concerns about your health care. Below is a description of clinic care coordination and how I can further assist you.     The clinic care coordinator is a registered nurse and/or  who understand the health care system. The goal of clinic care coordination is to help you manage your health and improve access to the [facility] system in the most efficient manner. The registered nurse can assist you in meeting your health care goals by providing education, coordinating services, and strengthening the communication among your providers. The  can assist you with financial, behavioral, psychosocial, chemical dependency, counseling, and/or psychiatric resources.    Please feel free to contact me at [phone], with any questions or concerns. We at [facility] are focused on providing you with the highest-quality healthcare experience possible and that all starts with you.     Sincerely,     Guicho Joiner MSN, RN, PHN, CCM   Primary Care Clinical RN Care Coordinator  [facility]  1/27/2020   12:08 PM  [email or web address]  Office: [phone]

## 2020-01-28 ENCOUNTER — TELEPHONE (OUTPATIENT)
Dept: FAMILY MEDICINE | Facility: CLINIC | Age: 70
End: 2020-01-28

## 2020-01-28 NOTE — TELEPHONE ENCOUNTER
Reason for Call:  Other     Detailed comments: Patient is wanting a call back from team RN. No reason given.     Phone Number Patient can be reached at: Cell number on file:    Telephone Information:   Mobile [phone]       Best Time: Anytime    Can we leave a detailed message on this number? YES    Call taken on 1/28/2020 at 9:16 AM by Maricruz Mcclain

## 2020-01-28 NOTE — PROGRESS NOTES
Clinic Care Coordination Contact  [facility]/Voicemail       Clinical Data: Care Coordinator Outreach  Outreach attempted x 3.  No voicemail available to leave a message on.  Plan: Care Coordinator sent care coordination introduction letter on 1/27/2020 via mail. Care Coordinator will do no further outreaches at this time.        Guicho Joiner MSN, RN, PHN, [facility]   Primary Care Clinical RN Care Coordinator  [facility]  1/28/2020   8:34 AM  [email or web address]  Office: [phone]
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
## 2020-01-28 NOTE — TELEPHONE ENCOUNTER
Patient stopped in to  his letter and he was very irate and said the letter wasn't correct and this was after Tika and patient talked.  He wanted supervisor's name and was given Aleah's name.  Patient returned and requested that Tika call him tomorrow.  [phone]

## 2020-01-29 NOTE — TELEPHONE ENCOUNTER
"Attempt # 1  Called patient at home [phone]  Was call answered?  Yes, patient upset that somebody sent his chart/information to , wonders who now has his information.    States does not need help at home, is safe, if needs help at home will call clinic and tell \"Doc\".  Would like to know who asked Guicho in  to call him? Nurse explained parameters for a referral when visits the ER - nurse not sure of the parameters or what triggers the referral.             Tika Mistry RN  [facility]      "

## 2020-02-18 NOTE — PROGRESS NOTES
Subjective     Gucci Logan is a 69 year old male who presents to clinic today for the following health issues:    HPI   Establish Care    ED/UC Followup:    Facility:  [facility] Emergency Room  Date of visit: 1-  Reason for visit: Abdominal Pain (Calculus of gallbladder without cholecystitis without obstruction)  Current Status: it's still the same      Impression and Plan:  Gucci Logan is a 69 y.o. male who presents to the Emergency Department with with right upper quadrant abdominal pain of rather acute in onset. Patient however does not have any significant tenderness to palpation in the right upper quadrant. Certainly no rebound or guarding or nothing to suggest an acute abdomen. However the symptoms area and body habitus would suggest possible cholelithiasis. Laboratory evaluation was essentially unremarkable. No significant changes. Ultrasound shows gallstones but negative Alcantara's sign and no acute findings such as gallbladder wall thickening or other acute findings. Patient most likely with a small gallstone that has passed. Certainly at risk for recurrent problems. Have asked patient to follow-up with his primary care provider to discuss referral to general surgery versus watchful waiting.    Diagnosis:  ENCOUNTER DIAGNOSES   ICD-10-CM   1. RUQ abdominal pain R10.11   2. Calculus of gallbladder without cholecystitis without obstruction K80.20     Patient is a poor historian and is tangential with speech.    Patient notes constant right sided abdominal pain, radiating to back for the past 2-3 months.  He notes a ventral and umbilical hernia.  Food does not worsen pain.  He denies nausea, vomiting.    Patient notes that he will be seeing a pulmonologist.  He notes that he had frequent infections and was on antibiotics through his primary care provider.  He notes persistent hoarseness.  He notes cough, mild shortness of breath.  He quit smoking 20-30 years ago.  He has  an appointment in April.    He also notes that he will be seeing a cardiologist soon due to heart murmur.  He notes significant edema to his lower edema.  He denies chest pain, dyspnea.  Last echocardiogram in 2017 did not show any valve disease.  He notes that edema is worse at the end of the day.    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  He has completed treatment with boceprivir in a study.  He notes that he had significant complications from a liver biopsy and was hospitalized with liver failure. He has known cirrhosis and esophageal varices.  He does not follow with a hepatologist and is unaware of varices diagnosis.  He had an EGD in 4/2029, where they were noted to be stable.    Patient has a diagnosis of Diabetes Mellitus Type 2  and HgbA1C of 8.  He has declined medication with his previous PCP and wants to work on diet and exercise.        Patient Active Problem List   Diagnosis     Advanced directives, counseling/discussion     CARDIOVASCULAR SCREENING; LDL GOAL LESS THAN 130     Hypertension goal BP (blood pressure) < 140/90     Anxiety     Obesity, unspecified obesity severity, unspecified obesity type     Gastroesophageal reflux disease, esophagitis presence not specified     Hyperlipidemia LDL goal <100     Impaired fasting glucose     Obesity (BMI 35.0-39.9) with comorbidity (H)     Diabetes mellitus, type 2 (H)     Type 2 diabetes mellitus with diabetic autonomic neuropathy, without long-term current use of insulin (H)     Past Surgical History:   Procedure Laterality Date     ARTHROSCOPY KNEE RT/LT      (L) with partial medial meniscectomy     C OPEN RX ANKLE DISLOCATN+FIXATN      (R)     C SPINAL FUSION,ANT,EA ADNL LEVEL      T12 - L1     CATARACT IOL, RT/LT  Nov and Dec 2017     COLONOSCOPY N/A 4/24/2019    Procedure: COLONOSCOPY, WITH POLYPECTOMY AND BIOPSY;  Surgeon: Leventhal, Thomas Michael, MD;  [facility]     DACRYOCYSTORHINOSTOMY Left 10/16/2018     Procedure: DACRYOCYSTORHINOSTOMY;  Surgeon: Madhu Krause MD;  Location: [facility]     ENDOSCOPIC ENDONASAL SURGERY       ENDOSCOPY  2-19-15     ESOPHAGOSCOPY, GASTROSCOPY, DUODENOSCOPY (EGD), COMBINED N/A 2019    Procedure: COMBINED ESOPHAGOSCOPY, GASTROSCOPY, DUODENOSCOPY (EGD) - hold aspirin ibuprofen or naproxen for one week prior (per physician order);  Surgeon: Leventhal, Thomas Michael, MD;  [facility]     EYE SURGERY       Hernia surgery Left      NASAL/SINUS POLYPECTOMY       REPAIR PTOSIS Left 10/16/2018    Procedure: LEFT UPPER LID PTOSIS AND BILATERAL  BROW PTOSIS REPAIR WITH LEFT DACRYOCYSTORHINOSTOMY ;  Surgeon: Madhu Krause MD;  Location: [facility]     REPAIR PTOSIS BROW Bilateral 10/16/2018    Procedure: REPAIR PTOSIS BROW;  Surgeon: Madhu Krause MD;  Location: [facility]     ROTATOR CUFF REPAIR RT/LT Right        Social History     Tobacco Use     Smoking status: Former Smoker     Packs/day: 0.00     Years: 5.00     Pack years: 0.00     Types: Cigarettes     Start date: 1990     Last attempt to quit: 1999     Years since quittin.1     Smokeless tobacco: Never Used   Substance Use Topics     Alcohol use: No     Comment: quit in      Family History   Problem Relation Age of Onset     Alzheimer Disease Mother 85     Dementia Mother      Cerebrovascular Disease Father 50     Cancer Father      Hypertension Father      Neurologic Disorder No family hx of      Diabetes No family hx of          Current Outpatient Medications   Medication Sig Dispense Refill     amLODIPine (NORVASC) 5 MG tablet TAKE 1 TABLET BY MOUTH ONCE DAILY 90 tablet 0     Cholecalciferol (VITAMIN D) 2000 UNITS CAPS 1 po daily 90 capsule 3     EPINEPHrine (EPIPEN/ADRENACLICK/OR ANY BX GENERIC EQUIV) 0.3 MG/0.3ML injection 2-pack Inject 0.3 mLs (0.3 mg) into the muscle as needed for anaphylaxis 2 each 1     hydrochlorothiazide (HYDRODIURIL) 50 MG tablet Take 1 tablet (50 mg) by mouth  "daily 90 tablet 1     HYDROcodone-acetaminophen (NORCO) 5-325 MG tablet Take 1 tablet by mouth every 6 hours as needed for moderate to severe pain 24 tablet 0     losartan (COZAAR) 100 MG tablet TAKE 1 TABLET BY MOUTH ONCE DAILY 90 tablet 0     metoprolol succinate  MG PO 24 hr tablet Take 1 tablet (100 mg) by mouth daily 90 tablet 1     Multiple Vitamins-Minerals (MULTIVITAMIN ADULT PO)        omeprazole (PRILOSEC) 20 MG DR capsule TAKE ONE CAPSULE BY MOUTH ONCE DAILY.  TAKE 30-60 MINUTES BEFORE A MEAL. 90 capsule 3     Allergies   Allergen Reactions     Estradiol Other (See Comments)     Delirium, fever  PN: vaccine (?)     Haemophilus Influenzae Nausea and Other (See Comments)     PN: delirium  fever  PN: delirium  fever       Influenza Vaccines Other (See Comments)     PN: delirium  fever  Delirium, fever,       Thimerosal Other (See Comments)     Delirium, fever     BP Readings from Last 3 Encounters:   02/20/20 (!) 150/70   01/07/20 (!) 167/65   12/23/19 138/71    Wt Readings from Last 3 Encounters:   02/20/20 124 kg (273 lb 6.4 oz)   01/07/20 124.3 kg (274 lb)   12/23/19 124.4 kg (274 lb 3 oz)                      Reviewed and updated as needed this visit by Provider  Tobacco  Allergies  Meds  Problems  Med Hx  Surg Hx  Fam Hx         Review of Systems   ROS COMP: Constitutional, HEENT, cardiovascular, pulmonary, gi and gu systems are negative, except as otherwise noted.      Objective    BP (!) 150/70   Pulse 85   Temp 98.6  F (37  C) (Oral)   Resp 16   Ht 1.803 m (5' 11\")   Wt 124 kg (273 lb 6.4 oz)   SpO2 94%   BMI 38.13 kg/m    Body mass index is 38.13 kg/m .  Physical Exam   GENERAL: healthy, alert and no distress  RESP: lungs clear to auscultation - no rales, rhonchi or wheezes  CV: regular rate and rhythm, normal S1 S2, no S3 or S4, no murmur, click or rub, no peripheral edema and peripheral pulses strong  MS: tenderness to palpation of right anterior chest wall, right mid thoracic " back    Diagnostic Test Results:  In process        Assessment & Plan     1. Type 2 diabetes mellitus with diabetic autonomic neuropathy, without long-term current use of insulin (H)  Patient declines medication at this time.  He would like to retest in 3 months.    2. Right-sided chest wall pain  Rule out mass, PE given prolonged pain.  Consider HIDA scan if normal. Differential also includes musculoskeletal pain.  - CT Chest w Contrast; Future    3. Bilateral lower extremity edema  No edema noted today.  Rule out heart failure as contributing to cough, edema.  Possible related to cirrhosis as well.  Consider discontinuing amlodipine once blood pressure stabilized to see if edema improves.  - CBC with platelets and differential  - Comprehensive metabolic panel (BMP + Alb, Alk Phos, ALT, AST, Total. Bili, TP)  - BNP-N terminal pro  - TSH with free T4 reflex    4. Cough  Chest CT as above.  Possibly COPD given smoking history.  Keep follow-up with pulmonology at this point.e  - BNP-N terminal pro    5. Hypertension goal BP (blood pressure) < 140/90  Uncontrolled.  Increase metoprolol as below.  - metoprolol succinate  MG PO 24 hr tablet; Take 1 tablet (100 mg) by mouth daily  Dispense: 90 tablet; Refill: 1    6. Dyspnea, unspecified type    - BNP-N terminal pro    7. Other cirrhosis of liver (H)  Check labs as above.  Encourage follow-up with hepatologist for routine care at next appointment.               Return in about 2 weeks (around 3/5/2020).    DIANE Dias [facility]

## 2020-02-20 ENCOUNTER — ANCILLARY PROCEDURE (OUTPATIENT)
Dept: CT IMAGING | Facility: CLINIC | Age: 70
End: 2020-02-20
Attending: NURSE PRACTITIONER
Payer: COMMERCIAL

## 2020-02-20 ENCOUNTER — OFFICE VISIT (OUTPATIENT)
Dept: FAMILY MEDICINE | Facility: CLINIC | Age: 70
End: 2020-02-20
Payer: COMMERCIAL

## 2020-02-20 VITALS
WEIGHT: 273.4 LBS | TEMPERATURE: 98.6 F | DIASTOLIC BLOOD PRESSURE: 70 MMHG | BODY MASS INDEX: 38.27 KG/M2 | OXYGEN SATURATION: 94 % | HEIGHT: 71 IN | RESPIRATION RATE: 16 BRPM | SYSTOLIC BLOOD PRESSURE: 150 MMHG | HEART RATE: 85 BPM

## 2020-02-20 DIAGNOSIS — K74.69 OTHER CIRRHOSIS OF LIVER (H): ICD-10-CM

## 2020-02-20 DIAGNOSIS — R07.89 RIGHT-SIDED CHEST WALL PAIN: ICD-10-CM

## 2020-02-20 DIAGNOSIS — R06.00 DYSPNEA, UNSPECIFIED TYPE: ICD-10-CM

## 2020-02-20 DIAGNOSIS — I10 HYPERTENSION GOAL BP (BLOOD PRESSURE) < 140/90: ICD-10-CM

## 2020-02-20 DIAGNOSIS — R05.9 COUGH: ICD-10-CM

## 2020-02-20 DIAGNOSIS — R60.0 BILATERAL LOWER EXTREMITY EDEMA: ICD-10-CM

## 2020-02-20 DIAGNOSIS — E11.43 TYPE 2 DIABETES MELLITUS WITH DIABETIC AUTONOMIC NEUROPATHY, WITHOUT LONG-TERM CURRENT USE OF INSULIN (H): Primary | ICD-10-CM

## 2020-02-20 LAB
ALBUMIN SERPL-MCNC: 3.4 G/DL (ref 3.4–5)
ALP SERPL-CCNC: 86 U/L (ref 40–150)
ALT SERPL W P-5'-P-CCNC: 66 U/L (ref 0–70)
ANION GAP SERPL CALCULATED.3IONS-SCNC: 4 MMOL/L (ref 3–14)
AST SERPL W P-5'-P-CCNC: 45 U/L (ref 0–45)
BASOPHILS # BLD AUTO: 0 10E9/L (ref 0–0.2)
BASOPHILS NFR BLD AUTO: 0.3 %
BILIRUB SERPL-MCNC: 1.5 MG/DL (ref 0.2–1.3)
BUN SERPL-MCNC: 15 MG/DL (ref 7–30)
CALCIUM SERPL-MCNC: 8.9 MG/DL (ref 8.5–10.1)
CHLORIDE SERPL-SCNC: 105 MMOL/L (ref 94–109)
CO2 SERPL-SCNC: 30 MMOL/L (ref 20–32)
CREAT SERPL-MCNC: 0.96 MG/DL (ref 0.66–1.25)
DIFFERENTIAL METHOD BLD: ABNORMAL
EOSINOPHIL # BLD AUTO: 0.1 10E9/L (ref 0–0.7)
EOSINOPHIL NFR BLD AUTO: 1.6 %
ERYTHROCYTE [DISTWIDTH] IN BLOOD BY AUTOMATED COUNT: 13.5 % (ref 10–15)
GFR SERPL CREATININE-BSD FRML MDRD: 80 ML/MIN/{1.73_M2}
GLUCOSE SERPL-MCNC: 260 MG/DL (ref 70–99)
HCT VFR BLD AUTO: 44.5 % (ref 40–53)
HGB BLD-MCNC: 14.7 G/DL (ref 13.3–17.7)
LYMPHOCYTES # BLD AUTO: 0.8 10E9/L (ref 0.8–5.3)
LYMPHOCYTES NFR BLD AUTO: 10.4 %
MCH RBC QN AUTO: 31.7 PG (ref 26.5–33)
MCHC RBC AUTO-ENTMCNC: 33 G/DL (ref 31.5–36.5)
MCV RBC AUTO: 96 FL (ref 78–100)
MONOCYTES # BLD AUTO: 0.7 10E9/L (ref 0–1.3)
MONOCYTES NFR BLD AUTO: 9.4 %
NEUTROPHILS # BLD AUTO: 5.9 10E9/L (ref 1.6–8.3)
NEUTROPHILS NFR BLD AUTO: 78.3 %
NT-PROBNP SERPL-MCNC: 67 PG/ML (ref 0–125)
PLATELET # BLD AUTO: 86 10E9/L (ref 150–450)
POTASSIUM SERPL-SCNC: 3.7 MMOL/L (ref 3.4–5.3)
PROT SERPL-MCNC: 7.6 G/DL (ref 6.8–8.8)
RBC # BLD AUTO: 4.63 10E12/L (ref 4.4–5.9)
SODIUM SERPL-SCNC: 139 MMOL/L (ref 133–144)
TSH SERPL DL<=0.005 MIU/L-ACNC: 2.91 MU/L (ref 0.4–4)
WBC # BLD AUTO: 7.5 10E9/L (ref 4–11)

## 2020-02-20 PROCEDURE — 83880 ASSAY OF NATRIURETIC PEPTIDE: CPT | Performed by: NURSE PRACTITIONER

## 2020-02-20 PROCEDURE — 99214 OFFICE O/P EST MOD 30 MIN: CPT | Performed by: NURSE PRACTITIONER

## 2020-02-20 PROCEDURE — 71260 CT THORAX DX C+: CPT | Mod: TC

## 2020-02-20 PROCEDURE — 36415 COLL VENOUS BLD VENIPUNCTURE: CPT | Performed by: NURSE PRACTITIONER

## 2020-02-20 PROCEDURE — 84443 ASSAY THYROID STIM HORMONE: CPT | Performed by: NURSE PRACTITIONER

## 2020-02-20 PROCEDURE — 85025 COMPLETE CBC W/AUTO DIFF WBC: CPT | Performed by: NURSE PRACTITIONER

## 2020-02-20 PROCEDURE — 80053 COMPREHEN METABOLIC PANEL: CPT | Performed by: NURSE PRACTITIONER

## 2020-02-20 RX ORDER — METOPROLOL SUCCINATE 100 MG/1
100 TABLET, EXTENDED RELEASE ORAL DAILY
Qty: 90 TABLET | Refills: 1 | Status: SHIPPED | OUTPATIENT
Start: 2020-02-20 | End: 2020-05-19

## 2020-02-20 RX ORDER — IOPAMIDOL 755 MG/ML
80 INJECTION, SOLUTION INTRAVASCULAR ONCE
Status: COMPLETED | OUTPATIENT
Start: 2020-02-20 | End: 2020-02-20

## 2020-02-20 RX ADMIN — IOPAMIDOL 80 ML: 755 INJECTION, SOLUTION INTRAVASCULAR at 13:23

## 2020-02-20 ASSESSMENT — MIFFLIN-ST. JEOR: SCORE: 2032.26

## 2020-02-20 NOTE — PATIENT INSTRUCTIONS
For scheduling at [facility] ([facility], [facility], [place]), call [phone] or [phone]     For scheduling in the North ([facility], [facility]) call  [phone] or  [phone]        For scheduling in the South ([facility], [facility][note 3]) call [phone]  or   [phone]

## 2020-02-21 ENCOUNTER — TELEPHONE (OUTPATIENT)
Dept: INTERNAL MEDICINE | Facility: CLINIC | Age: 70
End: 2020-02-21

## 2020-02-21 DIAGNOSIS — K82.8 DYSFUNCTIONAL GALLBLADDER: ICD-10-CM

## 2020-02-21 DIAGNOSIS — E04.1 THYROID NODULE: Primary | ICD-10-CM

## 2020-02-21 DIAGNOSIS — K74.60 CIRRHOSIS OF LIVER (H): ICD-10-CM

## 2020-02-21 NOTE — TELEPHONE ENCOUNTER
Attempted to call patient but no answer & VM not set up.  Try again later. If patient calls clinic, please transfer to RN Hotline [phone].    Please call patient-    His CT scan did not show any acute findings to explain his right chest wall pain.  The CT scan does show a right thyroid nodule.  I would like him to undergo thyroid ultrasound to evaluate further (indication thyroid nodule).  He also has a moderate sized hiatal hernia, but I do not think that it is causing his pain.  His CT also shows his cirrhosis.  His labs are stable aside from an elevated blood sugar and mild elevation in his bilirubin level.  I would like him to undergo a HIDA scan to evaluate his RUQ pain further to evaluate for dysfunction in his gallbladder.  I also would like him to establish care with a hepatologist to help manage his cirrhosis and make sure that we are doing everything possible to keep him healthy (gastroenteroloy, indication cirrhosis).    Thanks,  Heidi Farooq, CNP    Saumya Pisano, CASANDRA

## 2020-02-21 NOTE — PROGRESS NOTES
Subjective     Gucci Logan is a 69 year old male who presents to clinic today for the following health issues:    HPI   Chief Complaint   Patient presents with     RECHECK     2/20/2020     Patient is concerned that metoprolol is causing his lower extremity edema.  He also notes pruritis to his face and dry mouth.    Patient saw pulmonology.  He was noted to have normal spirometry and was recommended to follow-up with ENT for raspy voice and frequent sinusitis.  He was also recommended to have HIV testing.  Patient notes that he has had testing in the past with Hep C testing.  He does not want to do testing today.    He also saw cardiology and was recommended to undergo echocardiogram.  Patient declines.  He had normal echo in 2017.    Patient continues to have RUQ pain.  He had normal HIDA scan this week.  He thinks that symptoms are related to his diet.  He plans to resume a healthier diet and see if symptoms improve.            Patient Active Problem List   Diagnosis     Advanced directives, counseling/discussion     CARDIOVASCULAR SCREENING; LDL GOAL LESS THAN 130     Hypertension goal BP (blood pressure) < 140/90     Anxiety     Obesity, unspecified obesity severity, unspecified obesity type     Gastroesophageal reflux disease, esophagitis presence not specified     Hyperlipidemia LDL goal <100     Impaired fasting glucose     Obesity (BMI 35.0-39.9) with comorbidity (H)     Diabetes mellitus, type 2 (H)     Type 2 diabetes mellitus with diabetic autonomic neuropathy, without long-term current use of insulin (H)     Other cirrhosis of liver (H)     Past Surgical History:   Procedure Laterality Date     ARTHROSCOPY KNEE RT/LT      (L) with partial medial meniscectomy     C OPEN RX ANKLE DISLOCATN+FIXATN      (R)     C SPINAL FUSION,ANT,EA ADNL LEVEL      T12 - L1     CATARACT IOL, RT/LT  Nov and Dec 2017     COLONOSCOPY N/A 4/24/2019    Procedure: COLONOSCOPY, WITH POLYPECTOMY AND BIOPSY;  Surgeon:  Leventhal, Thomas Michael, MD;  Location: [facility]     DACRYOCYSTORHINOSTOMY Left 10/16/2018    Procedure: DACRYOCYSTORHINOSTOMY;  Surgeon: Madhu Krause MD;  Location: [facility]     ENDOSCOPIC ENDONASAL SURGERY       ENDOSCOPY  2-19-15     ESOPHAGOSCOPY, GASTROSCOPY, DUODENOSCOPY (EGD), COMBINED N/A 2019    Procedure: COMBINED ESOPHAGOSCOPY, GASTROSCOPY, DUODENOSCOPY (EGD) - hold aspirin ibuprofen or naproxen for one week prior (per physician order);  Surgeon: Leventhal, Thomas Michael, MD;  Location: [facility]     EYE SURGERY       Hernia surgery Left      NASAL/SINUS POLYPECTOMY       REPAIR PTOSIS Left 10/16/2018    Procedure: LEFT UPPER LID PTOSIS AND BILATERAL  BROW PTOSIS REPAIR WITH LEFT DACRYOCYSTORHINOSTOMY ;  Surgeon: Madhu Krause MD;  Location: [facility]     REPAIR PTOSIS BROW Bilateral 10/16/2018    Procedure: REPAIR PTOSIS BROW;  Surgeon: Madhu Krause MD;  Location: [facility]     ROTATOR CUFF REPAIR RT/LT Right        Social History     Tobacco Use     Smoking status: Former Smoker     Packs/day: 0.00     Years: 5.00     Pack years: 0.00     Types: Cigarettes     Start date: 1990     Last attempt to quit: 1999     Years since quittin.2     Smokeless tobacco: Never Used   Substance Use Topics     Alcohol use: No     Comment: quit in      Family History   Problem Relation Age of Onset     Alzheimer Disease Mother 85     Dementia Mother      Cerebrovascular Disease Father 50     Cancer Father      Hypertension Father      Neurologic Disorder No family hx of      Diabetes No family hx of          Current Outpatient Medications   Medication Sig Dispense Refill     amLODIPine (NORVASC) 5 MG tablet Take 0.5 tablets (2.5 mg) by mouth daily 90 tablet 0     Cholecalciferol (VITAMIN D) 2000 UNITS CAPS 1 po daily 90 capsule 3     EPINEPHrine (EPIPEN/ADRENACLICK/OR ANY BX GENERIC EQUIV) 0.3 MG/0.3ML injection 2-pack Inject 0.3 mLs (0.3 mg) into the muscle as needed for  "anaphylaxis 2 each 1     hydrochlorothiazide (HYDRODIURIL) 50 MG tablet Take 1 tablet (50 mg) by mouth daily 90 tablet 1     HYDROcodone-acetaminophen (NORCO) 5-325 MG tablet Take 1 tablet by mouth every 6 hours as needed for moderate to severe pain 24 tablet 0     losartan (COZAAR) 100 MG tablet TAKE 1 TABLET BY MOUTH ONCE DAILY 90 tablet 0     metoprolol succinate  MG PO 24 hr tablet Take 1 tablet (100 mg) by mouth daily 90 tablet 1     Multiple Vitamins-Minerals (MULTIVITAMIN ADULT PO)        omeprazole (PRILOSEC) 20 MG DR capsule TAKE ONE CAPSULE BY MOUTH ONCE DAILY.  TAKE 30-60 MINUTES BEFORE A MEAL. 90 capsule 3     Allergies   Allergen Reactions     Estradiol Other (See Comments)     Delirium, fever  PN: vaccine (?)     Haemophilus Influenzae Nausea and Other (See Comments)     PN: delirium  fever  PN: delirium  fever       Influenza Vaccines Other (See Comments)     PN: delirium  fever  Delirium, fever,       Thimerosal Other (See Comments)     Delirium, fever     BP Readings from Last 3 Encounters:   03/06/20 136/70   02/20/20 (!) 150/70   01/07/20 (!) 167/65    Wt Readings from Last 3 Encounters:   03/06/20 125.8 kg (277 lb 6.4 oz)   02/20/20 124 kg (273 lb 6.4 oz)   01/07/20 124.3 kg (274 lb)                      Reviewed and updated as needed this visit by Provider  Tobacco  Allergies  Meds  Problems  Med Hx  Surg Hx  Fam Hx         Review of Systems   ROS COMP: Constitutional, HEENT, cardiovascular, pulmonary, gi and gu systems are negative, except as otherwise noted.      Objective    /70   Pulse 77   Temp 98.1  F (36.7  C) (Oral)   Resp 16   Ht 1.803 m (5' 11\")   Wt 125.8 kg (277 lb 6.4 oz)   SpO2 91%   BMI 38.69 kg/m    Body mass index is 38.69 kg/m .  Physical Exam   GENERAL: healthy, alert and no distress  RESP: lungs clear to auscultation - no rales, rhonchi or wheezes  CV: regular rate and rhythm, normal S1 S2, no S3 or S4, no murmur, click or rub, no peripheral edema " and peripheral pulses strong  MS: no gross musculoskeletal defects noted, no edema    Diagnostic Test Results:  none         Assessment & Plan     1. Thyroid nodule  Noted on CT scan.  Patient agrees to undergo ultrasound for further evaluation.    2. Other cirrhosis of liver (H)  I strongly feel patient needs to follow-up with hepatology for ongoing monitoring of his cirrhosis.  He agrees, but declines referral.    3. Hypertension goal BP (blood pressure) < 140/90  I suspect patient's amlodipine is causing edema, though none noted on exam.  Patient to decrease amlodipine.  - amLODIPine (NORVASC) 5 MG tablet; Take 0.5 tablets (2.5 mg) by mouth daily  Dispense: 90 tablet; Refill: 0    4. Right-sided chest wall pain  Patient to continue to monitor.  I suspect musculoskeletal etiology.            Return in about 2 weeks (around 3/20/2020) for BP Recheck.    DIANE Dias [facility]

## 2020-02-24 NOTE — TELEPHONE ENCOUNTER
"Attempted to call patient again but no answer and VM reese is still not set up yet  If patient returns call, please send a skype message to RNs to take the call  If patient does not return call, will send a generic letter asking patient to return our call (demographics section says, \"DO NOT MAIL PT\" and \"GET VEREBAL CONSENT TO MAIL INFORMATION AND CONFIRM ADDRESS\")    Sigifredo Macario RN    "

## 2020-02-25 NOTE — TELEPHONE ENCOUNTER
Patient came to clinic today and wanted to get his lab results.  Since the RN's were trying to reach him to give him his CT results, the  asked if he could speak with an RN, but he wanted to talk to someone in person, not on the phone?  Patient told the  that we should have had the results at the  in an envelope ready for him to .      Patient said he had another appointment and couldn't wait, so he left.  Toya Cronin,

## 2020-02-25 NOTE — TELEPHONE ENCOUNTER
"Contacted pt to review results since he left and he started off by saying is this [facility]? You are not to contact me at this number. Writer then informed pt that I did not see a message on chart stating not to contact pt. Pt said that he had signed a do not contact form. \"Is the  lying to me? I want the number for the supervisor to speak with them about this. I came into the clinic and you refused to review results with me in person.\" See note below. Pt did not want to wait to get his results, so he left.  Writer was getting the supervisors contact information when he said I don't have time for this. I have another appointment to get to. I am switching clinics. Then pt hung up on writer before writer could give pt the supervisor's information. Writer placed orders per provider's message below and will leave results at  for pt to .    Katie Nair RN  [facility]    "

## 2020-03-02 ENCOUNTER — TRANSFERRED RECORDS (OUTPATIENT)
Dept: HEALTH INFORMATION MANAGEMENT | Facility: CLINIC | Age: 70
End: 2020-03-02

## 2020-03-02 ENCOUNTER — TELEPHONE (OUTPATIENT)
Dept: FAMILY MEDICINE | Facility: CLINIC | Age: 70
End: 2020-03-02

## 2020-03-02 NOTE — TELEPHONE ENCOUNTER
Called Dr. Louis Berry with [facility]  Updated him that Heidi Farooq NP is out of the office and will not return 3/6/2020  Patient has appointment with Heidi Farooq NP on 3/6/2020    Dr. Berry stated that patient should be bringing in his visit summary from [facility] to the appointment with Heidi Farooq NP   He would like Heidi Farooq NP to review it and call him back if she has any questions or if patient forgets to bring it with  (the number above is Dr. Berry's direct mobile number)    Sigifredo Macario RN

## 2020-03-04 ENCOUNTER — HOSPITAL ENCOUNTER (OUTPATIENT)
Dept: NUCLEAR MEDICINE | Facility: CLINIC | Age: 70
Setting detail: NUCLEAR MEDICINE
Discharge: HOME | End: 2020-03-04
Attending: NURSE PRACTITIONER | Admitting: NURSE PRACTITIONER
Payer: COMMERCIAL

## 2020-03-04 DIAGNOSIS — K82.8 DYSFUNCTIONAL GALLBLADDER: ICD-10-CM

## 2020-03-04 PROCEDURE — 78227 HEPATOBIL SYST IMAGE W/DRUG: CPT

## 2020-03-04 PROCEDURE — A9537 TC99M MEBROFENIN: HCPCS | Performed by: NURSE PRACTITIONER

## 2020-03-04 PROCEDURE — 34300033 ZZH RX 343: Performed by: NURSE PRACTITIONER

## 2020-03-04 RX ORDER — KIT FOR THE PREPARATION OF TECHNETIUM TC 99M MEBROFENIN 45 MG/10ML
6.2 INJECTION, POWDER, LYOPHILIZED, FOR SOLUTION INTRAVENOUS ONCE
Status: COMPLETED | OUTPATIENT
Start: 2020-03-04 | End: 2020-03-04

## 2020-03-04 RX ADMIN — MEBROFENIN 6.2 MILLICURIE: 45 INJECTION, POWDER, LYOPHILIZED, FOR SOLUTION INTRAVENOUS at 12:33

## 2020-03-04 NOTE — LETTER
[facility]  [address] [place] [address]  [place] [address]    March 5, 2020    Gucci Logan  DO NOT MAIL PT  [address] [facility] [address]          Dear Gucci,    Normal gallbladder function test      Enclosed is a copy of your results.     Results for orders placed or performed during the hospital encounter of 03/04/20   NM Hepatobiliary Scan w GB EF     Status: None    Narrative    Examination:  NM HEPATOBILIARY SCAN WITH GB EF      Date: 3/4/2020 2:50 PM.    Indication:   RUQ pain, evaluate for dysfunction in gallbladder;  Dysfunctional gallbladder     Additional Information: none.    Technique:    The patient received 6.2 mCi of Tc-99m Choletec intravenously. Images  were obtained out through 60 minutes.   At 60 minutes the patient  received [Amt of CCK] mcg of CCK over [Infusion Time] minutes. An  additional 45 minutes of images were obtained after the gall bladder  contraction intervention.    Findings:    There is prompt clearance of the radionuclide from the blood pool into  the liver. By 10 minutes there is clear visualization of the  intrahepatic ducts as well as the upper common bile duct. By 15  minutes there is visualization of the gallbladder. By 60 minutes there  is emptying from the common bile duct into the small bowel.    After CCK administration the Gallbladder ejection fraction was  measured at 84%.  The normal gallbladder EF based on a 45 minute  infusion is >40%.    Enterogastric reflux was not present.      Impression    Impression:    Normal hepatobiliary scan without evidence for acute or chronic  cholecystitis.    =======================    The normal Gall Bladder ejection fraction for a 45 minute infusion is  >40%    I have personally reviewed the examination and initial interpretation  and I agree with the findings.    VAL AHUMADA MD       If you have any questions or concerns, please call myself or my  nurse at [phone].      Sincerely,      Grace White MD /tinsley

## 2020-03-06 ENCOUNTER — OFFICE VISIT (OUTPATIENT)
Dept: FAMILY MEDICINE | Facility: CLINIC | Age: 70
End: 2020-03-06
Payer: COMMERCIAL

## 2020-03-06 ENCOUNTER — ANCILLARY PROCEDURE (OUTPATIENT)
Dept: ULTRASOUND IMAGING | Facility: CLINIC | Age: 70
End: 2020-03-06
Attending: NURSE PRACTITIONER
Payer: COMMERCIAL

## 2020-03-06 VITALS
HEIGHT: 71 IN | WEIGHT: 277.4 LBS | RESPIRATION RATE: 16 BRPM | TEMPERATURE: 98.1 F | SYSTOLIC BLOOD PRESSURE: 136 MMHG | DIASTOLIC BLOOD PRESSURE: 70 MMHG | OXYGEN SATURATION: 91 % | HEART RATE: 77 BPM | BODY MASS INDEX: 38.84 KG/M2

## 2020-03-06 DIAGNOSIS — I10 HYPERTENSION GOAL BP (BLOOD PRESSURE) < 140/90: ICD-10-CM

## 2020-03-06 DIAGNOSIS — E04.1 THYROID NODULE: Primary | ICD-10-CM

## 2020-03-06 DIAGNOSIS — K74.69 OTHER CIRRHOSIS OF LIVER (H): ICD-10-CM

## 2020-03-06 DIAGNOSIS — E04.1 THYROID NODULE: ICD-10-CM

## 2020-03-06 DIAGNOSIS — R07.89 RIGHT-SIDED CHEST WALL PAIN: ICD-10-CM

## 2020-03-06 PROCEDURE — 99214 OFFICE O/P EST MOD 30 MIN: CPT | Performed by: NURSE PRACTITIONER

## 2020-03-06 PROCEDURE — 76536 US EXAM OF HEAD AND NECK: CPT

## 2020-03-06 RX ORDER — AMLODIPINE BESYLATE 5 MG/1
2.5 TABLET ORAL DAILY
Qty: 90 TABLET | Refills: 0 | COMMUNITY
Start: 2020-03-06 | End: 2020-04-20

## 2020-03-06 ASSESSMENT — MIFFLIN-ST. JEOR: SCORE: 2050.41

## 2020-03-06 NOTE — PATIENT INSTRUCTIONS
Schedule thyroid ultrasound:    For scheduling at [facility] ([facility], [facility] [place]), call [phone] or [phone]     For scheduling in the North ([facility], [facility]) call  [phone] or  [phone]        For scheduling in the South ([facility]) call [phone]  or   [phone]        Schedule with [facility].  Decrease amlodipine to 2.5 mg daily.

## 2020-03-06 NOTE — TELEPHONE ENCOUNTER
Noted.  I was able to review note and discussed this with patient today.  See visit  Note.    Heidi Farooq, CNP

## 2020-03-09 NOTE — RESULT ENCOUNTER NOTE
Dear Gucci,    Your recent test results are attached.      Thyroid nodule appears benign.  No further workup needed.      If you have any questions please feel free to contact [phone] or myself via [employer].    Sincerely,  Heidi Farooq, CNP

## 2020-03-11 ENCOUNTER — OFFICE VISIT (OUTPATIENT)
Dept: OTOLARYNGOLOGY | Facility: CLINIC | Age: 70
End: 2020-03-11
Payer: COMMERCIAL

## 2020-03-11 VITALS
DIASTOLIC BLOOD PRESSURE: 89 MMHG | SYSTOLIC BLOOD PRESSURE: 170 MMHG | WEIGHT: 275 LBS | HEART RATE: 71 BPM | HEIGHT: 71 IN | BODY MASS INDEX: 38.5 KG/M2 | RESPIRATION RATE: 16 BRPM | OXYGEN SATURATION: 93 %

## 2020-03-11 DIAGNOSIS — K11.7 XEROSTOMIA: ICD-10-CM

## 2020-03-11 DIAGNOSIS — R49.0 HOARSENESS: Primary | ICD-10-CM

## 2020-03-11 DIAGNOSIS — D23.39 PAPILLOMA OF NOSE: ICD-10-CM

## 2020-03-11 PROCEDURE — 31575 DIAGNOSTIC LARYNGOSCOPY: CPT | Performed by: OTOLARYNGOLOGY

## 2020-03-11 PROCEDURE — 99203 OFFICE O/P NEW LOW 30 MIN: CPT | Mod: 25 | Performed by: OTOLARYNGOLOGY

## 2020-03-11 ASSESSMENT — MIFFLIN-ST. JEOR: SCORE: 2039.52

## 2020-03-11 ASSESSMENT — PAIN SCALES - GENERAL: PAINLEVEL: NO PAIN (0)

## 2020-03-11 NOTE — PATIENT INSTRUCTIONS
General Scheduling Information  To schedule your CT/MRI scan, please contact [place] [facility] at [phone]   [address] [place] [address]    To schedule your Surgery, please contact our Specialty Schedulers at [phone]    ENT Clinic Locations Clinic Hours Telephone Number     [facility] Reyes  [address] [place] [address]   Tuesday:       8:00am -- 4:00pm    Wednesday:  8:00am - 4:00pm   To schedule an appointment with   Dr. Adams,   please contact our   Specialty Scheduling Department at:     [phone]       Magali Campos  [address] [place] [address]   Friday:          8:00am - 4:00pm         Urgent Care Locations Clinic Hours Telephone Numbers     [facility] Clayton  [address] [place] [address]     Monday-Friday:     11:00pm - 9:00pm    Saturday-Sunday:  9:00am - 5:00pm   [phone]     Magali Campos  [address] [place] [address]     Monday-Friday:      5:00pm - 9:00pm     Saturday-Sunday:  9:00am - 5:00pm   [phone]

## 2020-03-11 NOTE — NURSING NOTE
Canelo to follow up with Primary Care provider regarding elevated blood pressure.  Dinorah Hodge MA

## 2020-03-11 NOTE — LETTER
3/11/2020         RE: Gucci Logan  Do Not Mail Pt  [address] [facility] [address]        Dear Colleague,    Thank you for referring your patient, Gucci Logan, to the [facility]. Please see a copy of my visit note below.    Chief Complaint - hoarseness    History of Present Illness - Gucci Logan is a 69 year old male who presents with a history of hoarseness since a few years. Feels he has a cold often. He gets some sensation in left throat. He feels something collects in the left throat. He feels he wants to reach down in scratch it. He does drink alcohol.,but very little. Coughs up yellow phlegm. he coughs up phlegm that he thinks is from below. No hemoptysis. No lumps in the head or neck. Former smoker. The patient notes a h/o reflux symptoms, but takes prilosec and that helps. What bothers him is cold symptoms. CT chest showed no lung disease. Has a cirrhotic liver with portal hypertension. H/o sinus surgery.    Has had cataracts and then needed nsaolacrimal duct surgery a few times, left was worse. He was taking antibiotics. Gets left eye matting.     Past Medical History -   Patient Active Problem List   Diagnosis     Advanced directives, counseling/discussion     CARDIOVASCULAR SCREENING; LDL GOAL LESS THAN 130     Hypertension goal BP (blood pressure) < 140/90     Anxiety     Obesity, unspecified obesity severity, unspecified obesity type     Gastroesophageal reflux disease, esophagitis presence not specified     Hyperlipidemia LDL goal <100     Impaired fasting glucose     Obesity (BMI 35.0-39.9) with comorbidity (H)     Diabetes mellitus, type 2 (H)     Type 2 diabetes mellitus with diabetic autonomic neuropathy, without long-term current use of insulin (H)     Other cirrhosis of liver (H)       Current Medications -   Current Outpatient Medications:      amLODIPine (NORVASC) 5 MG tablet, Take 0.5 tablets (2.5 mg) by mouth daily, Disp: 90 tablet, Rfl: 0      Cholecalciferol (VITAMIN D) 2000 UNITS CAPS, 1 po daily, Disp: 90 capsule, Rfl: 3     EPINEPHrine (EPIPEN/ADRENACLICK/OR ANY BX GENERIC EQUIV) 0.3 MG/0.3ML injection 2-pack, Inject 0.3 mLs (0.3 mg) into the muscle as needed for anaphylaxis, Disp: 2 each, Rfl: 1     hydrochlorothiazide (HYDRODIURIL) 50 MG tablet, Take 1 tablet (50 mg) by mouth daily, Disp: 90 tablet, Rfl: 1     HYDROcodone-acetaminophen (NORCO) 5-325 MG tablet, Take 1 tablet by mouth every 6 hours as needed for moderate to severe pain, Disp: 24 tablet, Rfl: 0     losartan (COZAAR) 100 MG tablet, TAKE 1 TABLET BY MOUTH ONCE DAILY, Disp: 90 tablet, Rfl: 0     metoprolol succinate  MG PO 24 hr tablet, Take 1 tablet (100 mg) by mouth daily, Disp: 90 tablet, Rfl: 1     Multiple Vitamins-Minerals (MULTIVITAMIN ADULT PO), , Disp: , Rfl:      omeprazole (PRILOSEC) 20 MG DR capsule, TAKE ONE CAPSULE BY MOUTH ONCE DAILY.  TAKE 30-60 MINUTES BEFORE A MEAL., Disp: 90 capsule, Rfl: 3    Allergies -   Allergies   Allergen Reactions     Estradiol Other (See Comments)     Delirium, fever  PN: vaccine (?)     Haemophilus Influenzae Nausea and Other (See Comments)     PN: delirium  fever  PN: delirium  fever       Influenza Vaccines Other (See Comments)     PN: delirium  fever  Delirium, fever,       Thimerosal Other (See Comments)     Delirium, fever       Social History -   Social History     Socioeconomic History     Marital status: Single     Spouse name: Not on file     Number of children: 0     Years of education: 18     Highest education level: Not on file   Occupational History     Occupation: Contractor     Employer: SELF     Comment: Not working now   Social Needs     Financial resource strain: Not on file     Food insecurity     Worry: Not on file     Inability: Not on file     Transportation needs     Medical: Not on file     Non-medical: Not on file   Tobacco Use     Smoking status: Former Smoker     Packs/day: 0.00     Years: 5.00     Pack years:  "0.00     Types: Cigarettes     Start date: 1990     Last attempt to quit: 1999     Years since quittin.2     Smokeless tobacco: Never Used   Substance and Sexual Activity     Alcohol use: No     Comment: quit in      Drug use: No     Sexual activity: Not Currently     Partners: Female   Lifestyle     Physical activity     Days per week: Not on file     Minutes per session: Not on file     Stress: Not on file   Relationships     Social connections     Talks on phone: Not on file     Gets together: Not on file     Attends Denominational service: Not on file     Active member of club or organization: Not on file     Attends meetings of clubs or organizations: Not on file     Relationship status: Not on file     Intimate partner violence     Fear of current or ex partner: Not on file     Emotionally abused: Not on file     Physically abused: Not on file     Forced sexual activity: Not on file   Other Topics Concern     Parent/sibling w/ CABG, MI or angioplasty before 65F 55M? No   Social History Narrative     Not on file       Family History -   Family History   Problem Relation Age of Onset     Alzheimer Disease Mother 85     Dementia Mother      Cerebrovascular Disease Father 50     Cancer Father      Hypertension Father      Neurologic Disorder No family hx of      Diabetes No family hx of        Review of Systems - As per HPI and PMHx, otherwise 10+ comprehensive system review is negative.    Physical Exam  BP (!) 170/89   Pulse 71   Resp 16   Ht 1.803 m (5' 11\")   Wt 124.7 kg (275 lb)   SpO2 93%   BMI 38.35 kg/m    General - The patient is nontoxic.  Alert and oriented to person and place, answers questions and cooperates with examination appropriately.   Voice and Breathing - The patient was breathing comfortably without the use of accessory muscles. There was no wheezing, stridor, or stertor.  The patients voice was mildly hoarse.   Eyes - Extraocular movements intact.  Sclera were not icteric " or injected, conjunctiva were pink and moist.  Mouth - Examination of the oral cavity showed pink, healthy oral mucosa. No lesions or ulcerations noted.  The tongue was mobile and midline, and the dentition were in good condition.    Throat - The walls of the oropharynx were smooth, pink, moist, symmetric, and had no lesions or ulcerations.  The tonsillar pillars and soft palate were symmetric.  The uvula was midline on elevation.   Nose - the patient had a left nasal sill papillomatous growth measuring approximately 7-8 mm.  The septum was thickened superiorly it is possible he also had a papillomatous lesion of the superior anterior septum.  He had some excess discolored drainage anteriorly in the left nasal cavity.  The right nasal cavity is more clean and clear.  Neck -  Soft, non-tender. Palpation of the occipital, submental, submandibular, internal jugular chain, and supraclavicular nodes did not demonstrate any abnormal lymph nodes or masses. Parotids without masses. Palpation of the thyroid was soft and smooth, with no nodules or goiter appreciated.  The trachea was mobile and midline. No pain of the anterior neck.  Neurologic - CN II-XII are grossly intact. No focal neurologic deficits.   Cardiovascular - carotid pulses are 2+ bilaterally, regular rhythm      Procedure: Flexible Endoscopy  Indication: Hoarseness    To best visualize the upper airway anatomy and due to the chief complaint and HPI, I proceeded with a fiberoptic examination.  First I sprayed both sides of the nose with a mixture of lidocaine and neosynephrine.  I then passed the scope through the left nasal cavity.  Again I saw the thickened septum superiorly and a papillomatous lesion in the sella.  I took photos.  Beyond this the left nasal cavity was mostly clear.  Left middle meatus was patent.  He had a left max antrostomy that was open and no infection in the sinus.  Sphenoethmoid recess was normal.  The nasopharynx was mucosally covered  and symmetric.  The Eustachian tube openings were unobstructed.  Going further down I had a clear view of the base of tongue which had normal appearing lingual tonsillar tissue.  The base of tongue was free of lesions, and the vallecula was open.  The epiglottis was smooth and mucosally covered.  He did have a lot of thickened mucus pooling in the oropharynx and hypopharynx.  I had him drinking gargle water and this cleared.  No masses.  The supraglottic larynx was then clearly visualized.  It was normal. the vocal cords moved with full abduction and adduction. They were white and no lesions were seen.  The pyriform sinuses were open, and the limited view of the postcricoid region did not show any lesions.  I then examined the right nasal cavity.  Is clean and clear.  I did not see any lesions on the side.      A/P - Gucci Logan is a 69 year old male with hoarseness due to dryness. Drink more fluids. Use biotene. He has chemical sensitivity.  Avoid chemicals that bother him.  Has left nasal papilloma, maybe 2 as the superior septum look thickened or irregular.. I'll remove the one papillomas lesion and biopsy the superior 1. Use nasal saline irrigations. No sinusitis on exam.  No other lesions noted.    Dr. Dudley Adams  Otolaryngology  [facility]      Again, thank you for allowing me to participate in the care of your patient.        Sincerely,        Dudley Adams MD

## 2020-03-11 NOTE — PROGRESS NOTES
Chief Complaint - hoarseness    History of Present Illness - Gucci Logan is a 69 year old male who presents with a history of hoarseness since a few years. Feels he has a cold often. He gets some sensation in left throat. He feels something collects in the left throat. He feels he wants to reach down in scratch it. He does drink alcohol.,but very little. Coughs up yellow phlegm. he coughs up phlegm that he thinks is from below. No hemoptysis. No lumps in the head or neck. Former smoker. The patient notes a h/o reflux symptoms, but takes prilosec and that helps. What bothers him is cold symptoms. CT chest showed no lung disease. Has a cirrhotic liver with portal hypertension. H/o sinus surgery.    Has had cataracts and then needed nsaolacrimal duct surgery a few times, left was worse. He was taking antibiotics. Gets left eye matting.     Past Medical History -   Patient Active Problem List   Diagnosis     Advanced directives, counseling/discussion     CARDIOVASCULAR SCREENING; LDL GOAL LESS THAN 130     Hypertension goal BP (blood pressure) < 140/90     Anxiety     Obesity, unspecified obesity severity, unspecified obesity type     Gastroesophageal reflux disease, esophagitis presence not specified     Hyperlipidemia LDL goal <100     Impaired fasting glucose     Obesity (BMI 35.0-39.9) with comorbidity (H)     Diabetes mellitus, type 2 (H)     Type 2 diabetes mellitus with diabetic autonomic neuropathy, without long-term current use of insulin (H)     Other cirrhosis of liver (H)       Current Medications -   Current Outpatient Medications:      amLODIPine (NORVASC) 5 MG tablet, Take 0.5 tablets (2.5 mg) by mouth daily, Disp: 90 tablet, Rfl: 0     Cholecalciferol (VITAMIN D) 2000 UNITS CAPS, 1 po daily, Disp: 90 capsule, Rfl: 3     EPINEPHrine (EPIPEN/ADRENACLICK/OR ANY BX GENERIC EQUIV) 0.3 MG/0.3ML injection 2-pack, Inject 0.3 mLs (0.3 mg) into the muscle as needed for anaphylaxis, Disp: 2 each, Rfl: 1      hydrochlorothiazide (HYDRODIURIL) 50 MG tablet, Take 1 tablet (50 mg) by mouth daily, Disp: 90 tablet, Rfl: 1     HYDROcodone-acetaminophen (NORCO) 5-325 MG tablet, Take 1 tablet by mouth every 6 hours as needed for moderate to severe pain, Disp: 24 tablet, Rfl: 0     losartan (COZAAR) 100 MG tablet, TAKE 1 TABLET BY MOUTH ONCE DAILY, Disp: 90 tablet, Rfl: 0     metoprolol succinate  MG PO 24 hr tablet, Take 1 tablet (100 mg) by mouth daily, Disp: 90 tablet, Rfl: 1     Multiple Vitamins-Minerals (MULTIVITAMIN ADULT PO), , Disp: , Rfl:      omeprazole (PRILOSEC) 20 MG DR capsule, TAKE ONE CAPSULE BY MOUTH ONCE DAILY.  TAKE 30-60 MINUTES BEFORE A MEAL., Disp: 90 capsule, Rfl: 3    Allergies -   Allergies   Allergen Reactions     Estradiol Other (See Comments)     Delirium, fever  PN: vaccine (?)     Haemophilus Influenzae Nausea and Other (See Comments)     PN: delirium  fever  PN: delirium  fever       Influenza Vaccines Other (See Comments)     PN: delirium  fever  Delirium, fever,       Thimerosal Other (See Comments)     Delirium, fever       Social History -   Social History     Socioeconomic History     Marital status: Single     Spouse name: Not on file     Number of children: 0     Years of education: 18     Highest education level: Not on file   Occupational History     Occupation: Contractor     Employer: SELF     Comment: Not working now   Social Needs     Financial resource strain: Not on file     Food insecurity     Worry: Not on file     Inability: Not on file     Transportation needs     Medical: Not on file     Non-medical: Not on file   Tobacco Use     Smoking status: Former Smoker     Packs/day: 0.00     Years: 5.00     Pack years: 0.00     Types: Cigarettes     Start date: 1990     Last attempt to quit: 1999     Years since quittin.2     Smokeless tobacco: Never Used   Substance and Sexual Activity     Alcohol use: No     Comment: quit in      Drug use: No     Sexual activity:  "Not Currently     Partners: Female   Lifestyle     Physical activity     Days per week: Not on file     Minutes per session: Not on file     Stress: Not on file   Relationships     Social connections     Talks on phone: Not on file     Gets together: Not on file     Attends [employer] service: Not on file     Active member of club or organization: Not on file     Attends meetings of clubs or organizations: Not on file     Relationship status: Not on file     Intimate partner violence     Fear of current or ex partner: Not on file     Emotionally abused: Not on file     Physically abused: Not on file     Forced sexual activity: Not on file   Other Topics Concern     Parent/sibling w/ CABG, MI or angioplasty before 65F 55M? No   Social History Narrative     Not on file       Family History -   Family History   Problem Relation Age of Onset     Alzheimer Disease Mother 85     Dementia Mother      Cerebrovascular Disease Father 50     Cancer Father      Hypertension Father      Neurologic Disorder No family hx of      Diabetes No family hx of        Review of Systems - As per HPI and PMHx, otherwise 10+ comprehensive system review is negative.    Physical Exam  BP (!) 170/89   Pulse 71   Resp 16   Ht 1.803 m (5' 11\")   Wt 124.7 kg (275 lb)   SpO2 93%   BMI 38.35 kg/m    General - The patient is nontoxic.  Alert and oriented to person and place, answers questions and cooperates with examination appropriately.   Voice and Breathing - The patient was breathing comfortably without the use of accessory muscles. There was no wheezing, stridor, or stertor.  The patients voice was mildly hoarse.   Eyes - Extraocular movements intact.  Sclera were not icteric or injected, conjunctiva were pink and moist.  Mouth - Examination of the oral cavity showed pink, healthy oral mucosa. No lesions or ulcerations noted.  The tongue was mobile and midline, and the dentition were in good condition.    Throat - The walls of the " oropharynx were smooth, pink, moist, symmetric, and had no lesions or ulcerations.  The tonsillar pillars and soft palate were symmetric.  The uvula was midline on elevation.   Nose - the patient had a left nasal sill papillomatous growth measuring approximately 7-8 mm.  The septum was thickened superiorly it is possible he also had a papillomatous lesion of the superior anterior septum.  He had some excess discolored drainage anteriorly in the left nasal cavity.  The right nasal cavity is more clean and clear.  Neck -  Soft, non-tender. Palpation of the occipital, submental, submandibular, internal jugular chain, and supraclavicular nodes did not demonstrate any abnormal lymph nodes or masses. Parotids without masses. Palpation of the thyroid was soft and smooth, with no nodules or goiter appreciated.  The trachea was mobile and midline. No pain of the anterior neck.  Neurologic - CN II-XII are grossly intact. No focal neurologic deficits.   Cardiovascular - carotid pulses are 2+ bilaterally, regular rhythm      Procedure: Flexible Endoscopy  Indication: Hoarseness    To best visualize the upper airway anatomy and due to the chief complaint and HPI, I proceeded with a fiberoptic examination.  First I sprayed both sides of the nose with a mixture of lidocaine and neosynephrine.  I then passed the scope through the left nasal cavity.  Again I saw the thickened septum superiorly and a papillomatous lesion in the sella.  I took photos.  Beyond this the left nasal cavity was mostly clear.  Left middle meatus was patent.  He had a left max antrostomy that was open and no infection in the sinus.  Sphenoethmoid recess was normal.  The nasopharynx was mucosally covered and symmetric.  The Eustachian tube openings were unobstructed.  Going further down I had a clear view of the base of tongue which had normal appearing lingual tonsillar tissue.  The base of tongue was free of lesions, and the vallecula was open.  The  epiglottis was smooth and mucosally covered.  He did have a lot of thickened mucus pooling in the oropharynx and hypopharynx.  I had him drinking gargle water and this cleared.  No masses.  The supraglottic larynx was then clearly visualized.  It was normal. the vocal cords moved with full abduction and adduction. They were white and no lesions were seen.  The pyriform sinuses were open, and the limited view of the postcricoid region did not show any lesions.  I then examined the right nasal cavity.  Is clean and clear.  I did not see any lesions on the side.      A/P - Gucci Logan is a 69 year old male with hoarseness due to dryness. Drink more fluids. Use biotene. He has chemical sensitivity.  Avoid chemicals that bother him.  Has left nasal papilloma, maybe 2 as the superior septum look thickened or irregular.. I'll remove the one papillomas lesion and biopsy the superior 1. Use nasal saline irrigations. No sinusitis on exam.  No other lesions noted.    Dr. Dudley Adams  Otolaryngology  [facility]

## 2020-03-18 DIAGNOSIS — K21.9 GASTROESOPHAGEAL REFLUX DISEASE WITHOUT ESOPHAGITIS: ICD-10-CM

## 2020-03-18 DIAGNOSIS — I10 HYPERTENSION GOAL BP (BLOOD PRESSURE) < 140/90: ICD-10-CM

## 2020-03-18 NOTE — TELEPHONE ENCOUNTER
"Requested Prescriptions   Pending Prescriptions Disp Refills     losartan (COZAAR) 100 MG tablet [Pharmacy Med Name: Losartan Potassium 100 MG Oral Tablet] 90 tablet 0     Sig: Take 1 tablet by mouth once daily   Last Written Prescription Date:  12/31/19  Last Fill Quantity: 90,  # refills: 0   Last office visit: 3/6/2020 with prescribing provider:     Future Office Visit:   Next 5 appointments (look out 90 days)    Mar 24, 2020  9:45 AM CDT  Return Visit with Dudley Adams MD  [facility] [address] [facility] [address]  [phone]   Mar 25, 2020  9:40 AM CDT  Office Visit with DIANE Jones CNP  [facility] ([facility]) [address] [place] [id number]  [phone]             Angiotensin-II Receptors Failed - 3/18/2020  2:35 PM        Failed - Last blood pressure under 140/90 in past 12 months     BP Readings from Last 3 Encounters:   03/11/20 (!) 170/89   03/06/20 136/70   02/20/20 (!) 150/70                 Passed - Recent (12 mo) or future (30 days) visit within the authorizing provider's specialty     Patient has had an office visit with the authorizing provider or a provider within the authorizing providers department within the previous 12 mos or has a future within next 30 days. See \"Patient Info\" tab in inbasket, or \"Choose Columns\" in Meds & Orders section of the refill encounter.              Passed - Medication is active on med list        Passed - Patient is age 18 or older        Passed - Normal serum creatinine on file in past 12 months     Recent Labs   Lab Test 02/20/20  1023   CR 0.96       Ok to refill medication if creatinine is low          Passed - Normal serum potassium on file in past 12 months     Recent Labs   Lab Test 02/20/20  1023   POTASSIUM 3.7                       omeprazole (PRILOSEC) 20 MG DR capsule [Pharmacy Med Name: Omeprazole 20 MG Oral Capsule Delayed " "Release] 90 capsule 0     Sig: TAKE 1 CAPSULE BY MOUTH ONCE DAILY 30 TO 60 MINUTES BEFORE A MEAL   Last Written Prescription Date:  2/5/19  Last Fill Quantity: 90,  # refills: 3   Last office visit: 3/6/2020 with prescribing provider:     Future Office Visit:   Next 5 appointments (look out 90 days)    Mar 24, 2020  9:45 AM CDT  Return Visit with Dudley Adams MD  [facility] ([facility] [address] [place] [id number]  [phone]   Mar 25, 2020  9:40 AM CDT  Office Visit with DIANE Jones CNP  [facility] ([facility]) [address] [facility] [address]  [phone]             PPI Protocol Passed - 3/18/2020  2:35 PM        Passed - Not on Clopidogrel (unless Pantoprazole ordered)        Passed - No diagnosis of osteoporosis on record        Passed - Recent (12 mo) or future (30 days) visit within the authorizing provider's specialty     Patient has had an office visit with the authorizing provider or a provider within the authorizing providers department within the previous 12 mos or has a future within next 30 days. See \"Patient Info\" tab in inbasket, or \"Choose Columns\" in Meds & Orders section of the refill encounter.              Passed - Medication is active on med list        Passed - Patient is age 18 or older             "

## 2020-03-19 RX ORDER — LOSARTAN POTASSIUM 100 MG/1
TABLET ORAL
Qty: 90 TABLET | Refills: 0 | Status: SHIPPED | OUTPATIENT
Start: 2020-03-19 | End: 2020-05-19

## 2020-03-25 ENCOUNTER — TELEPHONE (OUTPATIENT)
Dept: FAMILY MEDICINE | Facility: CLINIC | Age: 70
End: 2020-03-25

## 2020-03-25 DIAGNOSIS — J01.90 ACUTE SINUSITIS WITH SYMPTOMS > 10 DAYS: ICD-10-CM

## 2020-03-25 DIAGNOSIS — I10 HYPERTENSION GOAL BP (BLOOD PRESSURE) < 140/90: ICD-10-CM

## 2020-03-25 NOTE — TELEPHONE ENCOUNTER
Called patient informed of message below. Patient declines to schedule a visit. He will call back if he would like to schedule a visit or says he may call his GP. Kelly Terry MA

## 2020-03-25 NOTE — TELEPHONE ENCOUNTER
Please schedule patient for office visit.  Will address UTI, sinusitis, blood pressure at appointment.    Heidi Farooq, CNP

## 2020-03-25 NOTE — TELEPHONE ENCOUNTER
"Called patient  He stated that he changed his visit to a phone visit due to his sinusitis x few days   He stated that this is a chronic issue for him and \"it is nothing more than sinusitis\" with the sinus pressure/pain and HA  Denies fever, cough, sob, myalgia   He repeated again that it is nothing more than just sinusitis   He then mentioned that he thinks he has a urinary tract infection as well  Patient requested abx for his symptoms  He also proceeded to ask questions about his BP med and if he can just go to a nearby facility to get his BP checked  Explained to him that he may need to have a phone visit or OV to have these issues all of his issues  Will ask provider     Sigifredo Macario RN    "

## 2020-03-25 NOTE — TELEPHONE ENCOUNTER
I called and discussed in detail  Sent in prescription  Follow up if not resolving  Richi Jaramillo MD

## 2020-03-25 NOTE — TELEPHONE ENCOUNTER
"Phone visit today needs to be rescheduled as an OV due to need to recheck BP  Cannot be phone visit    Attempted to call but no answer  VM box not set up. Unable to leave message  Need to triage/screen his \"illness\" to see if he can be seen face to face    Sigifredo Macario RN    "

## 2020-03-25 NOTE — TELEPHONE ENCOUNTER
Reason for call:  Patient reporting a symptom    Symptom or request: Sinusitis, UTI    Duration (how long have symptoms been present): 5-6 days    Have you been treated for this before? No    Additional comments: Patient stated the sinusitis comes and goes but, the UTI started 5-6 days ago. Patient also stated he needs his blood pressure checked and he is wondering where he should go for this. Please call back.    Phone Number patient can be reached at:  Home number on file [phone] (home)    Best Time:  Anytime    Can we leave a detailed message on this number:  YES    Call taken on 3/25/2020 at 1:33 PM by Lorrie Watson

## 2020-03-25 NOTE — TELEPHONE ENCOUNTER
I called and spoke to patient he has  Symptoms of sinus pressure, nasal congestion, runny nose, headaches for the past week.   He is also having a burning at the tip of his penis but no symptoms when actually urination.  Sofia Conde CMA

## 2020-03-25 NOTE — TELEPHONE ENCOUNTER
Patient scheduled for office visit today for hypertension. In office visit recommended. Not able to assess blood pressure by phone if patient is unable to check blood pressure Appointment changed to telephone visit at patient requests because of illness. Per Heidi Montalvo, please triage patient to see if appointment can be rescheduled. Kelly Terry MA

## 2020-03-26 RX ORDER — HYDROCHLOROTHIAZIDE 50 MG/1
TABLET ORAL
Qty: 90 TABLET | Refills: 0 | Status: SHIPPED | OUTPATIENT
Start: 2020-03-26 | End: 2020-06-05

## 2020-03-26 NOTE — TELEPHONE ENCOUNTER
"Requested Prescriptions   Pending Prescriptions Disp Refills     hydrochlorothiazide (HYDRODIURIL) 50 MG tablet [Pharmacy Med Name: HYDROCHLOROTHIAZIDE 50MG TABLETS] 90 tablet 1     Sig: TAKE 1 TABLET BY MOUTH EVERY DAY   Last Written Prescription Date:  11-5-19  Last Fill Quantity: 90,  # refills: 1   Last office visit: 3/6/2020 with prescribing provider:  1-7-2020   Future Office Visit:        Diuretics (Including Combos) Protocol Failed - 3/25/2020  6:55 PM        Failed - Blood pressure under 140/90 in past 12 months     BP Readings from Last 3 Encounters:   03/11/20 (!) 170/89   03/06/20 136/70   02/20/20 (!) 150/70                 Passed - Recent (12 mo) or future (30 days) visit within the authorizing provider's specialty     Patient has had an office visit with the authorizing provider or a provider within the authorizing providers department within the previous 12 mos or has a future within next 30 days. See \"Patient Info\" tab in inbasket, or \"Choose Columns\" in Meds & Orders section of the refill encounter.              Passed - Medication is active on med list        Passed - Patient is age 18 or older        Passed - Normal serum creatinine on file in past 12 months     Recent Labs   Lab Test 02/20/20  1023   CR 0.96              Passed - Normal serum potassium on file in past 12 months     Recent Labs   Lab Test 02/20/20  1023   POTASSIUM 3.7                    Passed - Normal serum sodium on file in past 12 months     Recent Labs   Lab Test 02/20/20  1023                      "

## 2020-03-26 NOTE — TELEPHONE ENCOUNTER
Sent remainder of refills to new pharmacy.  Closing this encounter.  Arina Christian, PETERN, RN

## 2020-04-03 ENCOUNTER — TELEPHONE (OUTPATIENT)
Dept: FAMILY MEDICINE | Facility: CLINIC | Age: 70
End: 2020-04-03

## 2020-04-03 NOTE — TELEPHONE ENCOUNTER
Prior Authorization Retail Medication Request    Medication/Dose: HCTZ  ICD code (if different than what is on RX):    Previously Tried and Failed:    Rationale:      Insurance Name:  [employer]  Insurance ID:  [id number]      Pharmacy Information (if different than what is on RX)  Name:  [facility]  Phone:  [phone]  Sofia Conde CMA

## 2020-04-03 NOTE — TELEPHONE ENCOUNTER
Central Prior Authorization Team   Phone: [phone]      PA NOT NEEDED    Medication: HCTZ-PA NOT NEEDED  Insurance Company:    Pharmacy Filling the Rx: [id number] - [facility] [address] AT [facility] [address]  Filling Pharmacy Phone: [phone]  Filling Pharmacy Fax:    Start Date: 4/3/2020    Called pharmacy to see what rejection they are getting since most insurances cover this medication.  Pharmacy was not requesting a PA they were requesting refills but unsure of that as well since the doctor did write a prescription on 3/26/20 for 90 days.

## 2020-04-20 ENCOUNTER — OFFICE VISIT (OUTPATIENT)
Dept: FAMILY MEDICINE | Facility: CLINIC | Age: 70
End: 2020-04-20
Payer: COMMERCIAL

## 2020-04-20 VITALS
OXYGEN SATURATION: 95 % | WEIGHT: 278.13 LBS | TEMPERATURE: 97.9 F | SYSTOLIC BLOOD PRESSURE: 162 MMHG | BODY MASS INDEX: 38.79 KG/M2 | HEART RATE: 61 BPM | DIASTOLIC BLOOD PRESSURE: 70 MMHG

## 2020-04-20 DIAGNOSIS — R60.0 BILATERAL LOWER EXTREMITY EDEMA: ICD-10-CM

## 2020-04-20 DIAGNOSIS — Z11.9 SCREENING EXAMINATION FOR INFECTIOUS DISEASE: ICD-10-CM

## 2020-04-20 DIAGNOSIS — E78.5 HYPERLIPIDEMIA LDL GOAL <100: ICD-10-CM

## 2020-04-20 DIAGNOSIS — I10 HYPERTENSION GOAL BP (BLOOD PRESSURE) < 140/90: Primary | ICD-10-CM

## 2020-04-20 DIAGNOSIS — R53.83 FATIGUE, UNSPECIFIED TYPE: ICD-10-CM

## 2020-04-20 DIAGNOSIS — R73.01 IMPAIRED FASTING GLUCOSE: ICD-10-CM

## 2020-04-20 LAB
ALBUMIN SERPL-MCNC: 3.4 G/DL (ref 3.4–5)
ALP SERPL-CCNC: 85 U/L (ref 40–150)
ALT SERPL W P-5'-P-CCNC: 75 U/L (ref 0–70)
ANION GAP SERPL CALCULATED.3IONS-SCNC: 7 MMOL/L (ref 3–14)
AST SERPL W P-5'-P-CCNC: 61 U/L (ref 0–45)
BILIRUB SERPL-MCNC: 1.4 MG/DL (ref 0.2–1.3)
BUN SERPL-MCNC: 13 MG/DL (ref 7–30)
CALCIUM SERPL-MCNC: 8.8 MG/DL (ref 8.5–10.1)
CHLORIDE SERPL-SCNC: 105 MMOL/L (ref 94–109)
CHOLEST SERPL-MCNC: 194 MG/DL
CO2 SERPL-SCNC: 27 MMOL/L (ref 20–32)
CREAT SERPL-MCNC: 0.92 MG/DL (ref 0.66–1.25)
GFR SERPL CREATININE-BSD FRML MDRD: 85 ML/MIN/{1.73_M2}
GLUCOSE SERPL-MCNC: 248 MG/DL (ref 70–99)
HBA1C MFR BLD: 9.7 % (ref 0–5.6)
HDLC SERPL-MCNC: 40 MG/DL
LDLC SERPL CALC-MCNC: 95 MG/DL
NONHDLC SERPL-MCNC: 154 MG/DL
POTASSIUM SERPL-SCNC: 4 MMOL/L (ref 3.4–5.3)
PROT SERPL-MCNC: 7.6 G/DL (ref 6.8–8.8)
SODIUM SERPL-SCNC: 139 MMOL/L (ref 133–144)
TRIGL SERPL-MCNC: 294 MG/DL

## 2020-04-20 PROCEDURE — 80053 COMPREHEN METABOLIC PANEL: CPT | Performed by: FAMILY MEDICINE

## 2020-04-20 PROCEDURE — 99214 OFFICE O/P EST MOD 30 MIN: CPT | Performed by: FAMILY MEDICINE

## 2020-04-20 PROCEDURE — 83036 HEMOGLOBIN GLYCOSYLATED A1C: CPT | Performed by: FAMILY MEDICINE

## 2020-04-20 PROCEDURE — 85025 COMPLETE CBC W/AUTO DIFF WBC: CPT | Performed by: FAMILY MEDICINE

## 2020-04-20 PROCEDURE — 87389 HIV-1 AG W/HIV-1&-2 AB AG IA: CPT | Performed by: FAMILY MEDICINE

## 2020-04-20 PROCEDURE — 36415 COLL VENOUS BLD VENIPUNCTURE: CPT | Performed by: FAMILY MEDICINE

## 2020-04-20 PROCEDURE — 80061 LIPID PANEL: CPT | Performed by: FAMILY MEDICINE

## 2020-04-20 RX ORDER — SPIRONOLACTONE 25 MG/1
25 TABLET ORAL DAILY
Qty: 30 TABLET | Refills: 1 | Status: SHIPPED | OUTPATIENT
Start: 2020-04-20 | End: 2020-05-19

## 2020-04-20 NOTE — PROGRESS NOTES
Subjective     Gucci Logan is a 69 year old male who presents to clinic today for the following health issues:    HPI   Hypertension Follow-up      Do you check your blood pressure regularly outside of the clinic? Yes     Are you following a low salt diet? No    Are your blood pressures ever more than 140 on the top number (systolic) OR more   than 90 on the bottom number (diastolic), for example 140/90? Yes but has only checked a few times      How many servings of fruits and vegetables do you eat daily?  0-1    On average, how many sweetened beverages do you drink each day (Examples: soda, juice, sweet tea, etc.  Do NOT count diet or artificially sweetened beverages)?   0    How many days per week do you exercise enough to make your heart beat faster?     How many minutes a day do you exercise enough to make your heart beat faster?     How many days per week do you miss taking your medication? 0                  Reviewed and updated as needed this visit by Provider         Review of Systems   ROS COMP: gall bladder pain but functioning okay    Wondering about side effects on metoprolol  Internal med increased metoprolol, got side effects    Swelling on amlodipine, so got off that    Up to 100  Mg metoprolol    Headaches come and go    Feet  Still aching and sore if on feet a lot            Objective    BP (!) 185/76 (BP Location: Right arm, Patient Position: Chair, Cuff Size: Adult Regular)   Pulse 61   Temp 97.9  F (36.6  C) (Oral)   Wt 126.2 kg (278 lb 2 oz)   SpO2 95%   BMI 38.79 kg/m    Body mass index is 38.79 kg/m .  Physical Exam  Constitutional:       Appearance: He is well-developed.   HENT:      Head: Normocephalic and atraumatic.   Eyes:      Conjunctiva/sclera: Conjunctivae normal.   Neck:      Vascular: No carotid bruit.   Cardiovascular:      Rate and Rhythm: Normal rate and regular rhythm.      Heart sounds: Normal heart sounds.   Pulmonary:      Effort: Pulmonary effort is normal. No  respiratory distress.      Breath sounds: Normal breath sounds.   Musculoskeletal:      Right lower leg: Edema present.      Left lower leg: Edema present.   Neurological:      Mental Status: He is alert and oriented to person, place, and time.      Cranial Nerves: No cranial nerve deficit.   Psychiatric:         Speech: Speech normal.         Behavior: Behavior normal.         mild pretibial edema bilat symmetric        Diagnostic Test Results:  Labs reviewed in Epic           email   [email or web address]        ASSESSMENT / PLAN:  (I10) Hypertension goal BP (blood pressure) < 140/90  (primary encounter diagnosis)  Comment: needs better control.  Start this.  Call with problems/ questions.  See us in 4 weeks, sooner if needed.  Also due to multiple side effects on the higher dose of metoprolol, go down from 100 to  50 mg.  Plan: spironolactone (ALDACTONE) 25 MG tablet             (Z11.9) Screening examination for infectious disease  Comment: patient wanted this checked; not high risk   Plan: HIV Antigen Antibody Combo             (R73.01) Impaired fasting glucose  Comment: check   Plan: Hemoglobin A1c             (R53.83) Fatigue, unspecified type  Comment: check; may be multifactorial   Plan: CBC with platelets differential, Comprehensive         metabolic panel             (E78.5) Hyperlipidemia LDL goal <100  Comment: almost fasting today; he wanted this checked   Plan: Lipid panel reflex to direct LDL Non-fasting              (R60.0) Bilateral lower extremity edema  Comment: agree with staying off amlodipine entirely.    Plan: increase walking, low sodium diet, wt loss.       I reviewed the patient's medications, allergies, medical history, family history, and social history.    Richi Jaramillo MD

## 2020-04-20 NOTE — PATIENT INSTRUCTIONS
Decrease metoprolol from 100 to 50 mg daily    Start low dose spironolactone  25 mg  Daily    Lowsalt  Diet    Increase  Walking    Stay off amlodipine    We will send you lab results

## 2020-04-20 NOTE — LETTER
[facility]  [address] [place]  [address]  [phone]        April 22, 2020    Gucci Logan  [address] [facility] [address]        Dear Gucci,    Unfortunately the diabetes test ( hemoglobin a1c ) is quite high.  We need to start a prescription medication for this.     We should do an evisit or phone visit soon to chat about this and prescribe medication.     Triglycerides are higher also.     Liver tests are mildly elevated.     Call us or email.     Results for orders placed or performed in visit on 04/20/20   Hemoglobin A1c     Status: Abnormal   Result Value Ref Range    Hemoglobin A1C 9.7 (H) 0 - 5.6 %   CBC with platelets differential     Status: Abnormal   Result Value Ref Range    WBC 3.8 (L) 4.0 - 11.0 10e9/L    RBC Count 4.46 4.4 - 5.9 10e12/L    Hemoglobin 14.0 13.3 - 17.7 g/dL    Hematocrit 43.0 40.0 - 53.0 %    MCV 96 78 - 100 fl    MCH 31.4 26.5 - 33.0 pg    MCHC 32.6 31.5 - 36.5 g/dL    RDW 13.2 10.0 - 15.0 %    Platelet Count 63 (L) 150 - 450 10e9/L    % Neutrophils 68.3 %    % Lymphocytes 18.4 %    % Monocytes 10.4 %    % Eosinophils 2.4 %    % Basophils 0.5 %    Absolute Neutrophil 2.6 1.6 - 8.3 10e9/L    Absolute Lymphocytes 0.7 (L) 0.8 - 5.3 10e9/L    Absolute Monocytes 0.4 0.0 - 1.3 10e9/L    Absolute Eosinophils 0.1 0.0 - 0.7 10e9/L    Absolute Basophils 0.0 0.0 - 0.2 10e9/L    Diff Method Automated Method    HIV Antigen Antibody Combo     Status: None   Result Value Ref Range    HIV Antigen Antibody Combo Nonreactive NR^Nonreactive       Comprehensive metabolic panel     Status: Abnormal   Result Value Ref Range    Sodium 139 133 - 144 mmol/L    Potassium 4.0 3.4 - 5.3 mmol/L    Chloride 105 94 - 109 mmol/L    Carbon Dioxide 27 20 - 32 mmol/L    Anion Gap 7 3 - 14 mmol/L    Glucose 248 (H) 70 - 99 mg/dL    Urea Nitrogen 13 7 - 30 mg/dL    Creatinine 0.92 0.66 - 1.25 mg/dL    GFR Estimate 85 >60 mL/min/[1.73_m2]    GFR Estimate If  Black >90 >60 mL/min/[1.73_m2]    Calcium 8.8 8.5 - 10.1 mg/dL    Bilirubin Total 1.4 (H) 0.2 - 1.3 mg/dL    Albumin 3.4 3.4 - 5.0 g/dL    Protein Total 7.6 6.8 - 8.8 g/dL    Alkaline Phosphatase 85 40 - 150 U/L    ALT 75 (H) 0 - 70 U/L    AST 61 (H) 0 - 45 U/L   Lipid panel reflex to direct LDL Non-fasting     Status: Abnormal   Result Value Ref Range    Cholesterol 194 <200 mg/dL    Triglycerides 294 (H) <150 mg/dL    HDL Cholesterol 40 >39 mg/dL    LDL Cholesterol Calculated 95 <100 mg/dL    Non HDL Cholesterol 154 (H) <130 mg/dL       If you have any questions please call the clinic at [phone].    Sincerely,    Richi FERNANDEZ

## 2020-04-21 LAB
BASOPHILS # BLD AUTO: 0 10E9/L (ref 0–0.2)
BASOPHILS NFR BLD AUTO: 0.5 %
DIFFERENTIAL METHOD BLD: ABNORMAL
EOSINOPHIL # BLD AUTO: 0.1 10E9/L (ref 0–0.7)
EOSINOPHIL NFR BLD AUTO: 2.4 %
ERYTHROCYTE [DISTWIDTH] IN BLOOD BY AUTOMATED COUNT: 13.2 % (ref 10–15)
HCT VFR BLD AUTO: 43 % (ref 40–53)
HGB BLD-MCNC: 14 G/DL (ref 13.3–17.7)
HIV 1+2 AB+HIV1 P24 AG SERPL QL IA: NONREACTIVE
LYMPHOCYTES # BLD AUTO: 0.7 10E9/L (ref 0.8–5.3)
LYMPHOCYTES NFR BLD AUTO: 18.4 %
MCH RBC QN AUTO: 31.4 PG (ref 26.5–33)
MCHC RBC AUTO-ENTMCNC: 32.6 G/DL (ref 31.5–36.5)
MCV RBC AUTO: 96 FL (ref 78–100)
MONOCYTES # BLD AUTO: 0.4 10E9/L (ref 0–1.3)
MONOCYTES NFR BLD AUTO: 10.4 %
NEUTROPHILS # BLD AUTO: 2.6 10E9/L (ref 1.6–8.3)
NEUTROPHILS NFR BLD AUTO: 68.3 %
PLATELET # BLD AUTO: 63 10E9/L (ref 150–450)
RBC # BLD AUTO: 4.46 10E12/L (ref 4.4–5.9)
WBC # BLD AUTO: 3.8 10E9/L (ref 4–11)

## 2020-04-22 NOTE — RESULT ENCOUNTER NOTE
Unfortunately the diabetes test ( hemoglobin a1c ) is quite high.  We need to start a prescription medication for this.    We should do an evisit or phone visit soon to chat about this and prescribe medication.    Triglycerides are higher also.    Liver tests are mildly elevated.    Call us or email.    Richi Jaramillo MD

## 2020-04-23 ENCOUNTER — TELEPHONE (OUTPATIENT)
Dept: FAMILY MEDICINE | Facility: CLINIC | Age: 70
End: 2020-04-23

## 2020-04-23 DIAGNOSIS — E11.9 TYPE 2 DIABETES MELLITUS WITHOUT COMPLICATION, WITHOUT LONG-TERM CURRENT USE OF INSULIN (H): Primary | ICD-10-CM

## 2020-04-23 RX ORDER — GLUCOSAMINE HCL/CHONDROITIN SU 500-400 MG
CAPSULE ORAL
Qty: 100 EACH | Refills: 3 | Status: SHIPPED | OUTPATIENT
Start: 2020-04-23 | End: 2021-08-26

## 2020-04-23 RX ORDER — LANCETS
EACH MISCELLANEOUS
Qty: 100 EACH | Refills: 3 | Status: SHIPPED | OUTPATIENT
Start: 2020-04-23 | End: 2021-08-26

## 2020-04-23 NOTE — TELEPHONE ENCOUNTER
I called patient with labs  And discussed in detail   Start metformin for diabetes mellitus  Patient to call diab ed    See us in 2 months    Keep working on healthy diet/exercise and wt loss    Richi Jaramillo MD

## 2020-04-23 NOTE — TELEPHONE ENCOUNTER
Unfortunately the diabetes test ( hemoglobin a1c ) is quite high.  We need to start a prescription medication for this.     We should do an evisit or phone visit soon to chat about this and prescribe medication.     Triglycerides are higher also.     Liver tests are mildly elevated.     Call us or email.     Richi Jaramillo MD         Routed to PCP to please call patient.      Jenelle ALEX,RN  [facility]

## 2020-04-23 NOTE — TELEPHONE ENCOUNTER
Reason for Call:  Other     Detailed comments:  Patient was seen by Dr Jaramillo on 4/20/20 and he has some additional questions in regards to some issues that were discussed at appointment. Patient would like to speak to a nurse.     Phone Number Patient can be reached at: Home number on file [phone] (home)    Best Time: any    Can we leave a detailed message on this number? YES    Call taken on 4/23/2020 at 9:37 AM by Arina Coppola

## 2020-05-04 ENCOUNTER — TELEPHONE (OUTPATIENT)
Dept: FAMILY MEDICINE | Facility: CLINIC | Age: 70
End: 2020-05-04

## 2020-05-04 NOTE — TELEPHONE ENCOUNTER
Reason for call:  Patient reporting a symptom    Symptom or request: hard nodules on tendons on both palms, between last two fingers    Duration (how long have symptoms been present): today, after doing yard work    Have you been treated for this before? No    Additional comments: Patient says he notices hard nodules on tendons on both palms between the two last fingers. Patient says there are not caluses.    Phone Number patient can be reached at:  Cell number on file:    Telephone Information:   Mobile [phone]       Best Time:  anytime    Can we leave a detailed message on this number:  YES    Call taken on 5/4/2020 at 3:09 PM by Patrick Velasquez

## 2020-05-04 NOTE — TELEPHONE ENCOUNTER
I called patient, he clarifies that he has hard lumps on the tendons running from his last two fingers down his palm to wrist.  Hard lumps, round lumps pea sized in area of the first crease of his hand.    Both hands, he is sure it is not blisters or callouses.     States is not painful but does bother him; he doesn't really want to go all the way to [place] to see Espinoza bess.    He would rather have a provider look at this in our clinic to see if they even think it is something for orthopedics.  He is not very worried about the tendon lumps and thinks he will look online for advice as well.    He talked at length about the issue above.       He also would like to have his BP evaluated, says he was advised to decrease his metoprolol but his BP went up so he plans to go back to metoprolol 100 (I see his list already lists it that way).   He would like to be seen in a week or two to give this increase time to take effect.    Routed back to Dr. Jaramillo to advise on possible in person visit in the next week or two for BP eval and to look at lumps on his hands.    No covid red flags.    Connie Garza RN  [facility]

## 2020-05-04 NOTE — TELEPHONE ENCOUNTER
If not bothersome to patient, okay to monitor this.  If symptoms worsen/ real bothersome then see orthopedics.    Please inform patient.    Richi Jaramillo MD

## 2020-05-05 ENCOUNTER — TELEPHONE (OUTPATIENT)
Dept: FAMILY MEDICINE | Facility: CLINIC | Age: 70
End: 2020-05-05

## 2020-05-05 NOTE — TELEPHONE ENCOUNTER
Diabetes Education Scheduling Outreach #1:    Call to patient to schedule. Voicemail not set up.    Plan for 2nd outreach attempt within 1 week.    Marissa Montez  [place] [facility]  Diabetes and Nutrition Scheduling

## 2020-05-05 NOTE — TELEPHONE ENCOUNTER
I called and spoke to patient. He has decided to cut his Metoprolol to 75mg instead of 100 mg and would like to monitor his blood pressure for the rest of this week before he decides to be seen or not. He asked for this to be sent to Dr. Jaramillo as an FYI.   Sofia Conde CMA

## 2020-05-13 DIAGNOSIS — E11.9 TYPE 2 DIABETES MELLITUS WITHOUT COMPLICATION, WITHOUT LONG-TERM CURRENT USE OF INSULIN (H): ICD-10-CM

## 2020-05-14 ENCOUNTER — TELEPHONE (OUTPATIENT)
Dept: FAMILY MEDICINE | Facility: CLINIC | Age: 70
End: 2020-05-14

## 2020-05-14 NOTE — TELEPHONE ENCOUNTER
Patient contacted me  He wants a face to face clinic visit for ankle, knee, balance etc    Please help schedule    Richi Jaramillo MD

## 2020-05-15 RX ORDER — BLOOD-GLUCOSE METER
EACH MISCELLANEOUS
Refills: 0 | OUTPATIENT
Start: 2020-05-15

## 2020-05-15 NOTE — TELEPHONE ENCOUNTER
"I called [facility] pharmacy, they did receive the Rx's and patient got a meter.   She thinks he may have typed in wrong item by mistake?    She will follow up with him as it appears he should not need more testing supplies sent at this time.    \"Refusal\" routed back to pharmacy with note.    Connie Garza RN  [facility]      "

## 2020-05-15 NOTE — TELEPHONE ENCOUNTER
"\"No brand specified\" glucose meter was sent by Dr. Jaramillo 4/23/20.  Did they get that?    I called pharmacy, closed until 9 am, re-flagged for call later.    Connie Garza RN  [facility]      "

## 2020-05-19 ENCOUNTER — OFFICE VISIT (OUTPATIENT)
Dept: FAMILY MEDICINE | Facility: CLINIC | Age: 70
End: 2020-05-19
Payer: COMMERCIAL

## 2020-05-19 VITALS
WEIGHT: 271.5 LBS | SYSTOLIC BLOOD PRESSURE: 137 MMHG | HEART RATE: 64 BPM | BODY MASS INDEX: 37.87 KG/M2 | TEMPERATURE: 97.9 F | DIASTOLIC BLOOD PRESSURE: 62 MMHG

## 2020-05-19 DIAGNOSIS — J01.90 ACUTE SINUSITIS WITH SYMPTOMS > 10 DAYS: ICD-10-CM

## 2020-05-19 DIAGNOSIS — I10 HYPERTENSION GOAL BP (BLOOD PRESSURE) < 140/90: Primary | ICD-10-CM

## 2020-05-19 DIAGNOSIS — E66.9 OBESITY, UNSPECIFIED OBESITY SEVERITY, UNSPECIFIED OBESITY TYPE: ICD-10-CM

## 2020-05-19 DIAGNOSIS — R53.83 FATIGUE, UNSPECIFIED TYPE: ICD-10-CM

## 2020-05-19 DIAGNOSIS — E11.43 TYPE 2 DIABETES MELLITUS WITH DIABETIC AUTONOMIC NEUROPATHY, WITHOUT LONG-TERM CURRENT USE OF INSULIN (H): ICD-10-CM

## 2020-05-19 LAB
CREAT SERPL-MCNC: 0.93 MG/DL (ref 0.66–1.25)
GFR SERPL CREATININE-BSD FRML MDRD: 83 ML/MIN/{1.73_M2}
POTASSIUM SERPL-SCNC: 4.5 MMOL/L (ref 3.4–5.3)

## 2020-05-19 PROCEDURE — 99214 OFFICE O/P EST MOD 30 MIN: CPT | Performed by: FAMILY MEDICINE

## 2020-05-19 PROCEDURE — 84132 ASSAY OF SERUM POTASSIUM: CPT | Performed by: FAMILY MEDICINE

## 2020-05-19 PROCEDURE — 82565 ASSAY OF CREATININE: CPT | Performed by: FAMILY MEDICINE

## 2020-05-19 PROCEDURE — 84403 ASSAY OF TOTAL TESTOSTERONE: CPT | Performed by: FAMILY MEDICINE

## 2020-05-19 PROCEDURE — 36415 COLL VENOUS BLD VENIPUNCTURE: CPT | Performed by: FAMILY MEDICINE

## 2020-05-19 RX ORDER — SPIRONOLACTONE 25 MG/1
25 TABLET ORAL DAILY
Qty: 30 TABLET | Refills: 1 | Status: SHIPPED | OUTPATIENT
Start: 2020-05-19 | End: 2020-08-20

## 2020-05-19 RX ORDER — AMOXICILLIN 500 MG/1
500 CAPSULE ORAL 3 TIMES DAILY
Qty: 30 CAPSULE | Refills: 0 | Status: ON HOLD | OUTPATIENT
Start: 2020-05-19 | End: 2020-07-09

## 2020-05-19 RX ORDER — METOPROLOL SUCCINATE 50 MG/1
TABLET, EXTENDED RELEASE ORAL
Qty: 45 TABLET | Refills: 3 | Status: SHIPPED | OUTPATIENT
Start: 2020-05-19 | End: 2020-09-11

## 2020-05-19 ASSESSMENT — PAIN SCALES - GENERAL: PAINLEVEL: NO PAIN (0)

## 2020-05-19 NOTE — LETTER
[facility]   [address] [place]  [address]  [phone]                                   May 21, 2020    Gucci Logan  [address] [facility] [address]        Dear Gucci,    Testosterone level is fine.  Advise against any prescription testosterone.     Results for orders placed or performed in visit on 05/19/20   Testosterone total     Status: None   Result Value Ref Range    Testosterone Total 422 240 - 950 ng/dL   Potassium     Status: None   Result Value Ref Range    Potassium 4.5 3.4 - 5.3 mmol/L   Creatinine     Status: None   Result Value Ref Range    Creatinine 0.93 0.66 - 1.25 mg/dL    GFR Estimate 83 >60 mL/min/[1.73_m2]    GFR Estimate If Black >90 >60 mL/min/[1.73_m2]       If you have any questions please call the clinic at [phone]    Sincerely,    Richi Jaramillo MD  bmd

## 2020-05-19 NOTE — PROGRESS NOTES
Subjective     Gucci Logan is a 69 year old male who presents to clinic today for the following health issues:    HPI   Follow up blood pressure  Discuss medications  Sinus problems               Reviewed and updated as needed this visit by Provider         Review of Systems   Overall  Feeling  Okay    120s to 140s systolic  60s and 70s diastolic    Has home  Meter    Often 8 to 13 K steps    Staying active with projects    Lots of stress    Still taking spironolactone    Has to urinate urgently sometimes    Patient currently on 75 mg metoprolol    Sinus  Problems worse, patient thinks  He  Needs antibiotics    Wondering  About testosterone      Objective    BP (!) 165/82 (BP Location: Left arm, Patient Position: Chair, Cuff Size: Adult Regular)   Pulse 64   Temp 97.9  F (36.6  C) (Oral)   Wt 123.2 kg (271 lb 8 oz)   BMI 37.87 kg/m    Body mass index is 37.87 kg/m .  Physical Exam  Constitutional:       Appearance: He is well-developed.   HENT:      Head: Normocephalic and atraumatic.   Eyes:      Conjunctiva/sclera: Conjunctivae normal.   Neck:      Vascular: No carotid bruit.   Cardiovascular:      Rate and Rhythm: Normal rate and regular rhythm.      Heart sounds: Normal heart sounds.   Pulmonary:      Effort: Pulmonary effort is normal. No respiratory distress.      Breath sounds: Normal breath sounds.   Neurological:      Mental Status: He is alert and oriented to person, place, and time.      Cranial Nerves: No cranial nerve deficit.   Psychiatric:         Speech: Speech normal.         Behavior: Behavior normal.        Sinus  Tenderness    Oral mucosa okay    Tympanic membranes and canals okay        Diagnostic Test Results:  Labs reviewed in Epic           ASSESSMENT / PLAN:  (I10) Hypertension goal BP (blood pressure) < 140/90  (primary encounter diagnosis)  Comment:on recheck blood pressure okay. Patient has found the 75  Mg dose works well metoprolol.  Go with 50mg pill and use 1 1/2 pills  daily  Plan: metoprolol succinate ER (TOPROL-XL) 50 MG 24 hr        tablet, spironolactone (ALDACTONE) 25 MG         tablet, Potassium, Creatinine             (R53.83) Fatigue, unspecified type  Comment: check this.pt on an over the counter testosterone booster ( no actual testosterone in this )  Plan: Testosterone total             (J01.90) Acute sinusitis with symptoms > 10 days  Comment: will treat  Plan: amoxicillin (AMOXIL) 500 MG capsule        Follow up prn     (E11.43) Type 2 diabetes mellitus with diabetic autonomic neuropathy, without long-term current use of insulin (H)  Comment: strongly encouraged patient to start the metformin given his hemoglobin a1c result  Plan: then recheck in a couple months after being on med     (E66.9) Obesity, unspecified obesity severity, unspecified obesity type  Comment: discussed   Plan: keep working on healthy diet/exercise and wt loss        I reviewed the patient's medications, allergies, medical history, family history, and social history.    Richi Jaramillo MD

## 2020-05-19 NOTE — PATIENT INSTRUCTIONS
Take the antibiotics    We will send lab results    75 mg metoprolol 1 1/2 of 50 mg pill    Do  Start  Metformin 500 mg 2x daily ( diabetes med )

## 2020-05-20 NOTE — RESULT ENCOUNTER NOTE
Potassium and kidney test ( creatinine ) are normal.    We are still waiting for testosterone result.    Richi Jaramillo MD

## 2020-05-21 LAB — TESTOST SERPL-MCNC: 422 NG/DL (ref 240–950)

## 2020-05-29 NOTE — TELEPHONE ENCOUNTER
Diabetes Education Scheduling Outreach #2:    Call to patient to schedule. Voicemail not set up.    [facility] [place] [facility]  Diabetes and Nutrition Scheduling

## 2020-06-05 ENCOUNTER — TELEPHONE (OUTPATIENT)
Dept: FAMILY MEDICINE | Facility: CLINIC | Age: 70
End: 2020-06-05

## 2020-06-05 DIAGNOSIS — M65.30 TRIGGER FINGER, ACQUIRED: ICD-10-CM

## 2020-06-05 DIAGNOSIS — I10 HYPERTENSION GOAL BP (BLOOD PRESSURE) < 140/90: Primary | ICD-10-CM

## 2020-06-05 RX ORDER — HYDROCHLOROTHIAZIDE 25 MG/1
25 TABLET ORAL DAILY
Qty: 90 TABLET | Refills: 0 | Status: ON HOLD | OUTPATIENT
Start: 2020-06-05 | End: 2020-07-10

## 2020-06-05 NOTE — TELEPHONE ENCOUNTER
I spoke with patient  Dehydration hurting gums and teeth  We will decrease hydrochlorothiazide from 50 to 25 mg daily    Call in a couple weeks with symptom update    Also trigger finger    Will do orthopedics referral    Richi Jaramillo MD

## 2020-06-05 NOTE — TELEPHONE ENCOUNTER
Attempt # 1  Called patient at home number.[phone] (home)  Was call answered?  Yes, thinks heart medication is working has tweaked himself, but is so dehydrating, lost all teeth on right side, roots are exposed and getting cavities. Putting silver nitrate to try to stop the cavities. Eventually will lose all teeth. Is happy with BP medicine but side affect will lose all teeth. Bottom line is every tooth is affected. taking Hydrochlorothiazide 50 mg and spironolactone 25 mg daily. Was on 100 mg metoprolol is now taking 75 mg daily.     On both hands palms pinky and ring finger, like little mini pearls, not a callous, on inside looks like attached to tendons when straightens fingers can see tendons.    Ankle is gone, when walking 8 to 80920 steps per day going up and down ladders. Has cirrhosis and if has to have 2 beers to take the edge off to go to bed and get some sleep.  Would like something for pain, wants something quick acting that will kick the pain quick so can go to sleep, thinks can sleep through the pain once is asleep. So does not need long acting.    Do you have:     Fever >100.0   New cough  Shortness of breath  Chills  New loss of taste or smell  Generalized body aches  New persistent headache  New sore throat  New rash  Nausea, vomiting or diarrhea     Within the past 3 weeks, have you been exposed to someone with a known positive COVID 19 test?  Have you traveled anywhere?    Negative Covid-19 screening.      Routing to PCP to review and advise.              Tika Mistry RN  [facility]

## 2020-06-05 NOTE — TELEPHONE ENCOUNTER
Reason for Call:  Other call back, prescription and lab results    Detailed comments: Patient is calling wanting to speak with Dr. Jaramillo about the growths on his hands. He would also like to discuss his medication giving him abdominal cramps and diarrhea. Canelo had also said there was an error in his lab results. Please call patient to discuss.    Phone Number Patient can be reached at: Home number on file [phone] (home)    Best Time: Anytime    Can we leave a detailed message on this number? YES    Call taken on 6/5/2020 at 11:02 AM by Susi Rider

## 2020-06-22 ENCOUNTER — TELEPHONE (OUTPATIENT)
Dept: FAMILY MEDICINE | Facility: CLINIC | Age: 70
End: 2020-06-22

## 2020-06-22 DIAGNOSIS — I10 HYPERTENSION GOAL BP (BLOOD PRESSURE) < 140/90: ICD-10-CM

## 2020-06-25 RX ORDER — LOSARTAN POTASSIUM 100 MG/1
TABLET ORAL
Qty: 90 TABLET | Refills: 0 | OUTPATIENT
Start: 2020-06-25

## 2020-06-25 NOTE — TELEPHONE ENCOUNTER
Appears losartan taken off active med list 3/19/20?  Removed by Dr. Vasquez.    I cannot find notes in visits or calls addressing this, however.    In the actual refill encounter 3/18/20 it looks like it was refilled for 3 months on 3/18 then was discontinued by Dr. Vasquez 3/19/20.        Is he on this?    Attempted to call patient at home/mobile number, no answer, no voicemail set up, will need to try another time.    Connie Garza RN  [facility]

## 2020-06-25 NOTE — TELEPHONE ENCOUNTER
Nurse sees Dr. Jaramillo discontinued the Losartan on 5/19/2020      ASSESSMENT / PLAN:  (I10) Hypertension goal BP (blood pressure) < 140/90  (primary encounter diagnosis)  Comment:on recheck blood pressure okay. Patient has found the 75  Mg dose works well metoprolol.  Go with 50mg pill and use 1 1/2 pills daily  Plan: metoprolol succinate ER (TOPROL-XL) 50 MG 24 hr        tablet, spironolactone (ALDACTONE) 25 MG         tablet, Potassium, Creatinine      Refused as discontinued medication.      Tika Mistry RN  [facility]

## 2020-06-30 RX ORDER — LOSARTAN POTASSIUM 100 MG/1
100 TABLET ORAL DAILY
Qty: 90 TABLET | Refills: 1 | Status: SHIPPED | OUTPATIENT
Start: 2020-06-30 | End: 2020-09-11

## 2020-06-30 NOTE — TELEPHONE ENCOUNTER
Dr Jaramillo,     Pt called and was very upset that his Losartan was discontinued.  Can you verify if this was supposed to be discontinued.    Please advise  Wen Barnard  Team 3 Coordinator

## 2020-07-08 ENCOUNTER — APPOINTMENT (OUTPATIENT)
Dept: CT IMAGING | Facility: CLINIC | Age: 70
DRG: 391 | End: 2020-07-08
Attending: EMERGENCY MEDICINE
Payer: COMMERCIAL

## 2020-07-08 ENCOUNTER — HOSPITAL ENCOUNTER (INPATIENT)
Facility: CLINIC | Age: 70
LOS: 1 days | Discharge: HOME | DRG: 391 | End: 2020-07-10
Attending: EMERGENCY MEDICINE | Admitting: HOSPITALIST
Payer: COMMERCIAL

## 2020-07-08 ENCOUNTER — APPOINTMENT (OUTPATIENT)
Dept: ULTRASOUND IMAGING | Facility: CLINIC | Age: 70
DRG: 391 | End: 2020-07-08
Attending: EMERGENCY MEDICINE
Payer: COMMERCIAL

## 2020-07-08 ENCOUNTER — TELEPHONE (OUTPATIENT)
Dept: FAMILY MEDICINE | Facility: CLINIC | Age: 70
End: 2020-07-08

## 2020-07-08 DIAGNOSIS — I10 HYPERTENSION GOAL BP (BLOOD PRESSURE) < 140/90: ICD-10-CM

## 2020-07-08 DIAGNOSIS — K57.32 DIVERTICULITIS OF COLON: Primary | ICD-10-CM

## 2020-07-08 DIAGNOSIS — I82.0 HEPATIC VEIN THROMBOSIS (H): ICD-10-CM

## 2020-07-08 DIAGNOSIS — R10.11 RUQ ABDOMINAL PAIN: ICD-10-CM

## 2020-07-08 DIAGNOSIS — Z03.818 ENCNTR FOR OBS FOR SUSP EXPSR TO OTH BIOLG AGENTS RULED OUT: ICD-10-CM

## 2020-07-08 LAB
ABO + RH BLD: NORMAL
ABO + RH BLD: NORMAL
ALBUMIN SERPL-MCNC: 3.4 G/DL (ref 3.4–5)
ALBUMIN UR-MCNC: NEGATIVE MG/DL
ALP SERPL-CCNC: 73 U/L (ref 40–150)
ALT SERPL W P-5'-P-CCNC: 61 U/L (ref 0–70)
ANION GAP SERPL CALCULATED.3IONS-SCNC: 6 MMOL/L (ref 3–14)
APPEARANCE UR: CLEAR
APTT PPP: 30 SEC (ref 22–37)
AST SERPL W P-5'-P-CCNC: 46 U/L (ref 0–45)
BASOPHILS # BLD AUTO: 0 10E9/L (ref 0–0.2)
BASOPHILS NFR BLD AUTO: 0 %
BILIRUB SERPL-MCNC: 1.2 MG/DL (ref 0.2–1.3)
BILIRUB UR QL STRIP: NEGATIVE
BLD GP AB SCN SERPL QL: NORMAL
BLOOD BANK CMNT PATIENT-IMP: NORMAL
BUN SERPL-MCNC: 18 MG/DL (ref 7–30)
CALCIUM SERPL-MCNC: 8.5 MG/DL (ref 8.5–10.1)
CHLORIDE SERPL-SCNC: 108 MMOL/L (ref 94–109)
CO2 SERPL-SCNC: 26 MMOL/L (ref 20–32)
COLOR UR AUTO: YELLOW
CREAT SERPL-MCNC: 1.09 MG/DL (ref 0.66–1.25)
DIFFERENTIAL METHOD BLD: ABNORMAL
EOSINOPHIL # BLD AUTO: 0.1 10E9/L (ref 0–0.7)
EOSINOPHIL NFR BLD AUTO: 1.7 %
ERYTHROCYTE [DISTWIDTH] IN BLOOD BY AUTOMATED COUNT: 13.1 % (ref 10–15)
GFR SERPL CREATININE-BSD FRML MDRD: 69 ML/MIN/{1.73_M2}
GLUCOSE SERPL-MCNC: 290 MG/DL (ref 70–99)
GLUCOSE UR STRIP-MCNC: 300 MG/DL
HCT VFR BLD AUTO: 43.6 % (ref 40–53)
HGB BLD-MCNC: 14 G/DL (ref 13.3–17.7)
HGB UR QL STRIP: NEGATIVE
INR PPP: 1.14 (ref 0.86–1.14)
KETONES UR STRIP-MCNC: NEGATIVE MG/DL
LACTATE BLD-SCNC: 1.4 MMOL/L (ref 0.7–2)
LACTATE BLD-SCNC: 2.7 MMOL/L (ref 0.7–2)
LEUKOCYTE ESTERASE UR QL STRIP: NEGATIVE
LIPASE SERPL-CCNC: 185 U/L (ref 73–393)
LYMPHOCYTES # BLD AUTO: 0.5 10E9/L (ref 0.8–5.3)
LYMPHOCYTES NFR BLD AUTO: 7 %
MCH RBC QN AUTO: 32 PG (ref 26.5–33)
MCHC RBC AUTO-ENTMCNC: 32.1 G/DL (ref 31.5–36.5)
MCV RBC AUTO: 100 FL (ref 78–100)
MONOCYTES # BLD AUTO: 0.4 10E9/L (ref 0–1.3)
MONOCYTES NFR BLD AUTO: 5.2 %
MUCOUS THREADS #/AREA URNS LPF: PRESENT /LPF
NEUTROPHILS # BLD AUTO: 5.9 10E9/L (ref 1.6–8.3)
NEUTROPHILS NFR BLD AUTO: 86.1 %
NITRATE UR QL: NEGATIVE
PH UR STRIP: 6 PH (ref 5–7)
PLATELET # BLD AUTO: 83 10E9/L (ref 150–450)
PLATELET # BLD EST: ABNORMAL 10*3/UL
POTASSIUM SERPL-SCNC: 3.9 MMOL/L (ref 3.4–5.3)
PROT SERPL-MCNC: 7.1 G/DL (ref 6.8–8.8)
RBC # BLD AUTO: 4.38 10E12/L (ref 4.4–5.9)
RBC #/AREA URNS AUTO: 0 /HPF (ref 0–2)
RBC MORPH BLD: NORMAL
SODIUM SERPL-SCNC: 140 MMOL/L (ref 133–144)
SOURCE: ABNORMAL
SP GR UR STRIP: 1.02 (ref 1–1.03)
SPECIMEN EXP DATE BLD: NORMAL
TROPONIN I SERPL-MCNC: <0.015 UG/L (ref 0–0.04)
UROBILINOGEN UR STRIP-MCNC: 2 MG/DL (ref 0–2)
WBC # BLD AUTO: 6.9 10E9/L (ref 4–11)
WBC #/AREA URNS AUTO: 0 /HPF (ref 0–5)

## 2020-07-08 PROCEDURE — 81001 URINALYSIS AUTO W/SCOPE: CPT | Performed by: EMERGENCY MEDICINE

## 2020-07-08 PROCEDURE — 85025 COMPLETE CBC W/AUTO DIFF WBC: CPT | Performed by: EMERGENCY MEDICINE

## 2020-07-08 PROCEDURE — 96375 TX/PRO/DX INJ NEW DRUG ADDON: CPT | Performed by: EMERGENCY MEDICINE

## 2020-07-08 PROCEDURE — 93010 ELECTROCARDIOGRAM REPORT: CPT | Mod: Z6 | Performed by: EMERGENCY MEDICINE

## 2020-07-08 PROCEDURE — 83690 ASSAY OF LIPASE: CPT | Performed by: EMERGENCY MEDICINE

## 2020-07-08 PROCEDURE — 86850 RBC ANTIBODY SCREEN: CPT | Performed by: EMERGENCY MEDICINE

## 2020-07-08 PROCEDURE — 25800030 ZZH RX IP 258 OP 636: Performed by: EMERGENCY MEDICINE

## 2020-07-08 PROCEDURE — 84484 ASSAY OF TROPONIN QUANT: CPT | Performed by: EMERGENCY MEDICINE

## 2020-07-08 PROCEDURE — 99285 EMERGENCY DEPT VISIT HI MDM: CPT | Mod: 25 | Performed by: EMERGENCY MEDICINE

## 2020-07-08 PROCEDURE — 25000128 H RX IP 250 OP 636: Performed by: EMERGENCY MEDICINE

## 2020-07-08 PROCEDURE — 86900 BLOOD TYPING SEROLOGIC ABO: CPT | Performed by: EMERGENCY MEDICINE

## 2020-07-08 PROCEDURE — 96376 TX/PRO/DX INJ SAME DRUG ADON: CPT | Performed by: EMERGENCY MEDICINE

## 2020-07-08 PROCEDURE — 83605 ASSAY OF LACTIC ACID: CPT | Performed by: EMERGENCY MEDICINE

## 2020-07-08 PROCEDURE — 85730 THROMBOPLASTIN TIME PARTIAL: CPT | Performed by: EMERGENCY MEDICINE

## 2020-07-08 PROCEDURE — 96361 HYDRATE IV INFUSION ADD-ON: CPT | Performed by: EMERGENCY MEDICINE

## 2020-07-08 PROCEDURE — 96374 THER/PROPH/DIAG INJ IV PUSH: CPT | Performed by: EMERGENCY MEDICINE

## 2020-07-08 PROCEDURE — 85610 PROTHROMBIN TIME: CPT | Performed by: EMERGENCY MEDICINE

## 2020-07-08 PROCEDURE — C9803 HOPD COVID-19 SPEC COLLECT: HCPCS | Performed by: EMERGENCY MEDICINE

## 2020-07-08 PROCEDURE — 93005 ELECTROCARDIOGRAM TRACING: CPT | Performed by: EMERGENCY MEDICINE

## 2020-07-08 PROCEDURE — 86901 BLOOD TYPING SEROLOGIC RH(D): CPT | Performed by: EMERGENCY MEDICINE

## 2020-07-08 PROCEDURE — 25000128 H RX IP 250 OP 636: Performed by: STUDENT IN AN ORGANIZED HEALTH CARE EDUCATION/TRAINING PROGRAM

## 2020-07-08 PROCEDURE — 74177 CT ABD & PELVIS W/CONTRAST: CPT

## 2020-07-08 PROCEDURE — 80053 COMPREHEN METABOLIC PANEL: CPT | Performed by: EMERGENCY MEDICINE

## 2020-07-08 PROCEDURE — 76705 ECHO EXAM OF ABDOMEN: CPT

## 2020-07-08 RX ORDER — IOPAMIDOL 755 MG/ML
135 INJECTION, SOLUTION INTRAVASCULAR ONCE
Status: COMPLETED | OUTPATIENT
Start: 2020-07-08 | End: 2020-07-08

## 2020-07-08 RX ORDER — ONDANSETRON 2 MG/ML
4 INJECTION INTRAMUSCULAR; INTRAVENOUS ONCE
Status: COMPLETED | OUTPATIENT
Start: 2020-07-08 | End: 2020-07-08

## 2020-07-08 RX ORDER — HYDROMORPHONE HYDROCHLORIDE 1 MG/ML
0.5 INJECTION, SOLUTION INTRAMUSCULAR; INTRAVENOUS; SUBCUTANEOUS ONCE
Status: COMPLETED | OUTPATIENT
Start: 2020-07-08 | End: 2020-07-08

## 2020-07-08 RX ADMIN — HYDROMORPHONE HYDROCHLORIDE 0.5 MG: 1 INJECTION, SOLUTION INTRAMUSCULAR; INTRAVENOUS; SUBCUTANEOUS at 22:15

## 2020-07-08 RX ADMIN — ONDANSETRON 4 MG: 2 INJECTION INTRAMUSCULAR; INTRAVENOUS at 17:45

## 2020-07-08 RX ADMIN — HYDROMORPHONE HYDROCHLORIDE 0.5 MG: 1 INJECTION, SOLUTION INTRAMUSCULAR; INTRAVENOUS; SUBCUTANEOUS at 17:44

## 2020-07-08 RX ADMIN — ONDANSETRON 4 MG: 2 INJECTION INTRAMUSCULAR; INTRAVENOUS at 19:35

## 2020-07-08 RX ADMIN — SODIUM CHLORIDE 500 ML: 9 INJECTION, SOLUTION INTRAVENOUS at 18:10

## 2020-07-08 RX ADMIN — IOPAMIDOL 135 ML: 755 INJECTION, SOLUTION INTRAVENOUS at 20:49

## 2020-07-08 ASSESSMENT — MIFFLIN-ST. JEOR: SCORE: 2016.38

## 2020-07-08 NOTE — TELEPHONE ENCOUNTER
Reason for call:  Patient reporting a symptom    Symptom or request: Gallbladder issues     Duration (how long have symptoms been present): on going     Have you been treated for this before? No    Additional comments: Patient called in stating that he is having gallbladder issues and he would like to speak with a nurse to see what he should do. He is thinking he may need to go to the hospital and he would like to know what would be the best one for him. Please call to discuss.     Phone Number patient can be reached at:  Home number on file [phone] (home)    Best Time:  Any     Can we leave a detailed message on this number:  NO    Call taken on 7/8/2020 at 2:03 PM by Cony Thomas

## 2020-07-08 NOTE — TELEPHONE ENCOUNTER
Attempt # 1  Called patient at home [phone] (home)     Was call answered? Yes, patient states is at [facility] right now and doctor just walked in.              Tika Mistry RN  [facility]

## 2020-07-08 NOTE — ED PROVIDER NOTES
ED Provider Note  [facility]      History     Chief Complaint   Patient presents with     Abdominal Pain     Diarrhea     The history is provided by the patient and medical records.     Gucci Logan is a 70 year old male with a past medical history significant for DM2, HTN, GERD, cirrhosis of liver related to hepatitis C previously treated multiple years ago, and obesity who presents to the ED today with abdominal pain and loose stool. The patient complains of worsening RUQ abdominal pain, nausea, and loose stool since February.  He states he was evaluated back in February and it was shown that he did have several small gallstones and then had a HIDA scan in March which was normal without sign of acute or chronic cholecystitis.  He states that his pain has continued and waxes and wanes but never entirely goes away.  He reports that when he has very little pain when he wakes up in the morning, but by 1 pm has significant pain and nausea. He reports that eating sometimes makes it worse, but gets better as his food digests. He reports increased flatulence and loose stools.  No watery diarrhea.  No melena, hematochezia, or hematemesis.  No known history of ulcer in the past per patient.  Does report he has a hiatal hernia.  He states he was treated for his hepatitis C approximately 8 years ago.  He denies any recent consumption of undercooked food, drinking stream water, or known sick contacts. He does not feel as though his abdomen is more distended than normal. He endorses infrequent alcohol use. No recent alcohol use.  He has not taken anything at home for pain. He denies fever, cough, myalgias, chest pain, shortness of breath, dysuria, hematuria, recent travel, or known Covid exposure.  He states the pain does radiate into the back somewhat.  He reports nausea but no vomiting.  He denies any change in color of his stools.  He denies any history of DVT or PE.    Per chart review  patient was seen in the ED at [facility] 1/23 for RUQ abdominal pain. His symptoms suggested possible cholelithiasis. Laboratory evaluation was essentially unremarkable. No significant changes. Ultrasound showed gallstones but negative Alcantara's sign and no acute findings such as gallbladder wall thickening or other acute findings. Patient most likely had a small gallstone that passed. Report from his follow-up with his PCP 2/20 states complaints of RUQ abdominal pain with calculus of gallbladder without cholecystitis. He also had a normal HIDA scan 3/4.     US Abdomen gallbladder impression 1/23:  1.  No sonographic abnormality present to explain right upper quadrant pain.  2.  Several small gallstones, no evidence for cholecystitis.  3.  Cirrhotic configuration of the liver. Portal vein patent. No ascites.    CT chest impression 2/20:  1. No CT findings to explain patient's symptoms of right side chest  pain.  2. 1.1 cm right thyroid nodule, can be further evaluated with thyroid  ultrasound if clinically warranted (found to be benign on 3/6)  3. Cirrhotic liver with evidence of portal hypertension, including  splenomegaly and enlarged portosystemic collaterals.  4. Cholelithiasis without CT evidence of acute cholecystitis.  5. Moderate-sized hiatal hernia.      Past Medical History  Past Medical History:   Diagnosis Date     Arthritis      Esophageal reflux 3/1/2014     Hearing problem      Hiatal hernia      Hypertension      Liver disease      Obesity 1/24/2013     Obstructive sleep apnea      Sleep apnea     Advised CPAP machine. Not keen to use it.      Past Surgical History:   Procedure Laterality Date     ARTHROSCOPY KNEE RT/LT      (L) with partial medial meniscectomy     C OPEN RX ANKLE DISLOCATN+FIXATN      (R)     C SPINAL FUSION,ANT,EA ADNL LEVEL      T12 - L1     CATARACT IOL, RT/LT  Nov and Dec 2017     COLONOSCOPY N/A 4/24/2019    Procedure: COLONOSCOPY, WITH POLYPECTOMY AND BIOPSY;  Surgeon: Leventhal,  Tawanda Craig MD;  Location: [facility] OR     DACRYOCYSTORHINOSTOMY Left 10/16/2018    Procedure: DACRYOCYSTORHINOSTOMY;  Surgeon: Madhu Krause MD;  Location: [facility]     ENDOSCOPIC ENDONASAL SURGERY  1994     ENDOSCOPY  2-19-15     ESOPHAGOSCOPY, GASTROSCOPY, DUODENOSCOPY (EGD), COMBINED N/A 4/24/2019    Procedure: COMBINED ESOPHAGOSCOPY, GASTROSCOPY, DUODENOSCOPY (EGD) - hold aspirin ibuprofen or naproxen for one week prior (per physician order);  Surgeon: Leventhal, Thomas Michael, MD;  Location: [facility] OR     EYE SURGERY       Hernia surgery Left 1994     NASAL/SINUS POLYPECTOMY       REPAIR PTOSIS Left 10/16/2018    Procedure: LEFT UPPER LID PTOSIS AND BILATERAL  BROW PTOSIS REPAIR WITH LEFT DACRYOCYSTORHINOSTOMY ;  Surgeon: Madhu Krause MD;  Location: [facility]     REPAIR PTOSIS BROW Bilateral 10/16/2018    Procedure: REPAIR PTOSIS BROW;  Surgeon: Madhu Krause MD;  Location: [facility]     ROTATOR CUFF REPAIR RT/LT Right      alcohol swab prep pads  blood glucose (NO BRAND SPECIFIED) test strip  blood glucose calibration (NO BRAND SPECIFIED) solution  blood glucose monitoring (NO BRAND SPECIFIED) meter device kit  Cholecalciferol (VITAMIN D) 2000 UNITS CAPS  EPINEPHrine (EPIPEN/ADRENACLICK/OR ANY BX GENERIC EQUIV) 0.3 MG/0.3ML injection 2-pack  hydrochlorothiazide (HYDRODIURIL) 25 MG tablet  HYDROcodone-acetaminophen (NORCO) 5-325 MG tablet  losartan (COZAAR) 100 MG tablet  metFORMIN (GLUCOPHAGE) 500 MG tablet  metoprolol succinate ER (TOPROL-XL) 50 MG 24 hr tablet  Multiple Vitamins-Minerals (MULTIVITAMIN ADULT PO)  omeprazole (PRILOSEC) 20 MG DR capsule  spironolactone (ALDACTONE) 25 MG tablet  thin (NO BRAND SPECIFIED) lancets      Allergies   Allergen Reactions     Estradiol Other (See Comments)     Delirium, fever  PN: vaccine (?)     Haemophilus Influenzae Nausea and Other (See Comments)     PN: delirium  fever  PN: delirium  fever       Influenza Vaccines Other (See Comments)     PN: delirium   "fever  Delirium, fever,       Thimerosal Other (See Comments)     Delirium, fever     Past medical history, past surgical history, medications, and allergies were reviewed with the patient. Additional pertinent items: None    Family History  Family History   Problem Relation Age of Onset     Alzheimer Disease Mother 85     Dementia Mother      Cerebrovascular Disease Father 50     Cancer Father      Hypertension Father      Neurologic Disorder No family hx of      Diabetes No family hx of      Family history was reviewed with the patient. Additional pertinent items: None    Social History  Social History     Tobacco Use     Smoking status: Former Smoker     Packs/day: 0.00     Years: 5.00     Pack years: 0.00     Types: Cigarettes     Start date: 1990     Last attempt to quit: 1999     Years since quittin.5     Smokeless tobacco: Never Used   Substance Use Topics     Alcohol use: No     Comment: quit in      Drug use: No      Social history was reviewed with the patient. Additional pertinent items: None    Review of Systems  A complete review of systems was performed with pertinent positives and negatives noted in the HPI, and all other systems negative.    Physical Exam   BP: (!) 156/81  Pulse: 58  Heart Rate: 74  Temp: 97.8  F (36.6  C)  Resp: 16  Height: 180.3 cm (5' 11\")  Weight: 122.9 kg (271 lb)  SpO2: 95 %  Physical Exam  Vitals signs reviewed.   Constitutional:       General: He is not in acute distress.     Appearance: He is well-developed.   HENT:      Head: Normocephalic and atraumatic.      Nose: Nose normal.      Mouth/Throat:      Mouth: Mucous membranes are moist.      Pharynx: No oropharyngeal exudate.   Eyes:      Extraocular Movements: Extraocular movements intact.      Conjunctiva/sclera: Conjunctivae normal.      Pupils: Pupils are equal, round, and reactive to light.   Neck:      Musculoskeletal: Normal range of motion and neck supple.   Cardiovascular:      Rate and Rhythm: " Normal rate and regular rhythm.      Pulses: Normal pulses.      Heart sounds: Normal heart sounds. No murmur.   Pulmonary:      Effort: Pulmonary effort is normal. No respiratory distress.      Breath sounds: Normal breath sounds. No stridor. No wheezing or rales.   Abdominal:      General: Bowel sounds are normal. There is no distension.      Palpations: Abdomen is soft. There is no mass.      Tenderness: There is no right CVA tenderness, left CVA tenderness, guarding or rebound.      Comments: Obese abdomen.  No significant distention however though.  Patient has mild tenderness in the right upper quadrant.  No CVA tenderness bilaterally.  Negative Alcantara sign.   Musculoskeletal: Normal range of motion.         General: No swelling or tenderness.   Skin:     General: Skin is warm and dry.      Capillary Refill: Capillary refill takes less than 2 seconds.      Findings: No rash.   Neurological:      General: No focal deficit present.      Mental Status: He is alert and oriented to person, place, and time.      GCS: GCS eye subscore is 4. GCS verbal subscore is 5. GCS motor subscore is 6.      Cranial Nerves: No cranial nerve deficit.      Sensory: No sensory deficit.      Motor: No weakness or abnormal muscle tone.   Psychiatric:         Mood and Affect: Mood normal.         ED Course     5:14 PM  The patient was seen and examined by Dr. Lee in Room ED29.     Procedures             EKG Interpretation:      Interpreted by Nerissa Lee MD  Time reviewed: 5:40 pm  Symptoms at time of EKG: upper abdominal pain   Rhythm: normal sinus   Rate: normal  Axis: normal  Ectopy: none  Conduction: normal  ST Segments/ T Waves: No ST-T wave changes  Q Waves: question of III and avF  Comparison to prior: possible new Q waves in III and avF compared to 12/16/16.  Otherwise unchanged.    Clinical Impression: No evidence of acute ischemia          The Lactic acid level is elevated due to dehydration, at this time there  is no sign of severe sepsis or septic shock.     Results for orders placed or performed during the hospital encounter of 07/08/20   Abdomen US, limited (RUQ only)     Status: None    Narrative    EXAMINATION: Limited Abdominal Ultrasound, 7/8/2020 6:32 PM     COMPARISON: Ultrasound 11/27/2017    HISTORY: Evaluate for gallbladder pathology, liver pathology, ascites    FINDINGS:   Fluid: No evidence of ascites or pleural effusions.    Liver: The liver parenchyma is coarsened with peripheral nodularity,  measuring 18.2 cm in craniocaudal dimension. There is no focal mass.     Gallbladder: Multiple mobile shadowing gallstones. No gallbladder wall  thickening, pericholecystic fluid or sonographic Alcantara's sign.    Bile Ducts: Both the intra- and extrahepatic biliary system are of  normal caliber.  The common bile duct measures 6 mm in diameter.    Pancreas: Pancreas is largely obscured by overlying bowel gas.    Kidney: The right kidney measures 12.0 cm long. There is no  hydronephrosis or hydroureter, no shadowing renal calculi, cystic  lesion or mass.       Impression    IMPRESSION:   1.  Cholelithiasis, without additional sonographic findings to suggest  acute cholecystitis.  2.   Cirrhotic configuration of liver. No focal liver lesion.    I have personally reviewed the examination and initial interpretation  and I agree with the findings.    MATTEO LEMOS MD   CT Abdomen Pelvis w Contrast     Status: Abnormal   Result Value Ref Range    Radiologist flags New portal vein/SMV thrombus (Urgent)     Narrative    EXAMINATION: CT ABDOMEN PELVIS W CONTRAST, 7/8/2020 9:13 PM    TECHNIQUE:  Helical CT images from the lung bases through the  symphysis pubis were obtained with IV contrast. Contrast dose:  iopamidol (ISOVUE-370) solution 135 mL    COMPARISON: CT abdomen pelvis 11/27/2017    HISTORY: Right upper quadrant pain, history of cirrhosis, evaluate for  pathology    FINDINGS:    Abdomen and pelvis: Cirrhotic  configuration of liver. No focal liver  lesions. Multiple layering stones in the gallbladder. No intrahepatic  or extrahepatic biliary dilatation. The pancreas is unremarkable.  Spleen is enlarged and measures 14.6 cm in craniocaudal dimension.  Adrenal glands are unremarkable. Normal symmetric enhancement of both  kidneys. 4.6 cm exophytic cyst arising from the lateral mid left  kidney, previously measured 4.0 cm on 11/27/2017. Additional 2.0 cm  exophytic cyst the lower pole the left kidney, and 1.3 cm cyst in the  anterior mid right kidney. Several other subcentimeter hypodensities  in both kidneys are too small to characterize, favored to represent  additional cysts. No hydronephrosis or hydroureter. No renal or  ureteral stones. The urinary bladder, prostate and seminal vesicles  are unremarkable. Small bilateral fat-containing inguinal hernias,  left greater than right. No abnormally dilated loops of small or large  bowel. Colonic diverticulosis, without convincing evidence of acute  diverticulitis. Unremarkable appendix. Tiny fat-containing umbilical  hernia. Mild fat stranding is seen adjacent to the gallbladder and  along the colon at the hepatic flexure (series 5, image 147)    There is eccentric thrombus within the main portal vein extending into  the superior mesenteric vein (series 5, images 136-184), which is new  from 11/27/2017. Esophageal varices. Abdominal aorta is normal in  caliber. Several prominent portacaval lymph nodes measuring up to 10  mm in short axis, similar to prior. No other enlarged lymph nodes in  the abdomen or pelvis.    Lung bases: The heart is not enlarged. No pericardial effusion. The  lung bases are clear.    Bones and soft tissues: Posterior fusion instrumentation of the  posterior elements spanning T11/L2. Multilevel degenerative changes of  the spine. No acute or suspicious osseous lesions.      Impression    IMPRESSION:   1. There is eccentric thrombus within the main  portal vein, extending  into the superior mesenteric vein, which is new from the previous CT  11/27/2017.  2. There is mild fat stranding in the vicinity of the gallbladder and  adjacent to the colon at the hepatic flexure. The same-day ultrasound  is not suggestive of acute cholecystitis. Findings could represent  diverticulitis, although correlation with patient's symptoms is  recommended.  3. Cirrhotic configuration of liver and evidence of portal  hypertension including splenomegaly and esophageal varices.    [Urgent Result: New portal vein/SMV thrombus]    Finding was identified on 7/8/2020 8:57 PM.     [facility] was contacted by Dr. Rocha at 7/8/2020 9:14 PM and verbalized  understanding of the urgent finding.     I have personally reviewed the examination and initial interpretation  and I agree with the findings.    MATTEO LEMOS MD   Comprehensive metabolic panel     Status: Abnormal   Result Value Ref Range    Sodium 140 133 - 144 mmol/L    Potassium 3.9 3.4 - 5.3 mmol/L    Chloride 108 94 - 109 mmol/L    Carbon Dioxide 26 20 - 32 mmol/L    Anion Gap 6 3 - 14 mmol/L    Glucose 290 (H) 70 - 99 mg/dL    Urea Nitrogen 18 7 - 30 mg/dL    Creatinine 1.09 0.66 - 1.25 mg/dL    GFR Estimate 69 >60 mL/min/[1.73_m2]    GFR Estimate If Black 80 >60 mL/min/[1.73_m2]    Calcium 8.5 8.5 - 10.1 mg/dL    Bilirubin Total 1.2 0.2 - 1.3 mg/dL    Albumin 3.4 3.4 - 5.0 g/dL    Protein Total 7.1 6.8 - 8.8 g/dL    Alkaline Phosphatase 73 40 - 150 U/L    ALT 61 0 - 70 U/L    AST 46 (H) 0 - 45 U/L   Lipase     Status: None   Result Value Ref Range    Lipase 185 73 - 393 U/L   CBC with platelets differential     Status: Abnormal   Result Value Ref Range    WBC 6.9 4.0 - 11.0 10e9/L    RBC Count 4.38 (L) 4.4 - 5.9 10e12/L    Hemoglobin 14.0 13.3 - 17.7 g/dL    Hematocrit 43.6 40.0 - 53.0 %     78 - 100 fl    MCH 32.0 26.5 - 33.0 pg    MCHC 32.1 31.5 - 36.5 g/dL    RDW 13.1 10.0 - 15.0 %    Platelet Count 83 (L) 150 -  450 10e9/L    Diff Method Manual Differential     % Neutrophils 86.1 %    % Lymphocytes 7.0 %    % Monocytes 5.2 %    % Eosinophils 1.7 %    % Basophils 0.0 %    Absolute Neutrophil 5.9 1.6 - 8.3 10e9/L    Absolute Lymphocytes 0.5 (L) 0.8 - 5.3 10e9/L    Absolute Monocytes 0.4 0.0 - 1.3 10e9/L    Absolute Eosinophils 0.1 0.0 - 0.7 10e9/L    Absolute Basophils 0.0 0.0 - 0.2 10e9/L    RBC Morphology Normal     Platelet Estimate Confirming automated cell count    Partial thromboplastin time     Status: None   Result Value Ref Range    PTT 30 22 - 37 sec   INR     Status: None   Result Value Ref Range    INR 1.14 0.86 - 1.14   Lactic acid whole blood     Status: Abnormal   Result Value Ref Range    Lactic Acid 2.7 (H) 0.7 - 2.0 mmol/L   UA with Microscopic     Status: Abnormal   Result Value Ref Range    Color Urine Yellow     Appearance Urine Clear     Glucose Urine 300 (A) NEG^Negative mg/dL    Bilirubin Urine Negative NEG^Negative    Ketones Urine Negative NEG^Negative mg/dL    Specific Gravity Urine 1.019 1.003 - 1.035    Blood Urine Negative NEG^Negative    pH Urine 6.0 5.0 - 7.0 pH    Protein Albumin Urine Negative NEG^Negative mg/dL    Urobilinogen mg/dL 2.0 0.0 - 2.0 mg/dL    Nitrite Urine Negative NEG^Negative    Leukocyte Esterase Urine Negative NEG^Negative    Source Urine     WBC Urine 0 0 - 5 /HPF    RBC Urine 0 0 - 2 /HPF    Mucous Urine Present (A) NEG^Negative /LPF   Troponin I     Status: None   Result Value Ref Range    Troponin I ES <0.015 0.000 - 0.045 ug/L   Lactic acid     Status: None   Result Value Ref Range    Lactic Acid 1.4 0.7 - 2.0 mmol/L   EKG 12 lead     Status: None (Preliminary result)   Result Value Ref Range    Interpretation ECG Click View Image link to view waveform and result    ABO/Rh type and screen     Status: None   Result Value Ref Range    ABO A     RH(D) Pos     Antibody Screen Neg     Test Valid Only At          [facility]     Specimen Expires 07/11/2020      Medications   HYDROmorphone (PF) (DILAUDID) injection 0.5 mg (0.5 mg Intravenous Given 7/8/20 1744)   ondansetron (ZOFRAN) injection 4 mg (4 mg Intravenous Given 7/8/20 1745)   0.9% sodium chloride BOLUS (0 mLs Intravenous Stopped 7/8/20 1913)   ondansetron (ZOFRAN) injection 4 mg (4 mg Intravenous Given 7/8/20 1935)   iopamidol (ISOVUE-370) solution 135 mL (135 mLs Intravenous Given 7/8/20 2049)   sodium chloride (PF) 0.9% PF flush 84 mL (894 mLs Intravenous Given 7/8/20 2050)   HYDROmorphone (PF) (DILAUDID) injection 0.5 mg (0.5 mg Intravenous Given 7/8/20 2215)        Assessments & Plan (with Medical Decision Making)   On exam, patient is overall in no acute distress and well-appearing.  His vital signs are within normal limits and he is afebrile.  He has mild tenderness in the right upper quadrant without signs of peritonitis and no Alcantara sign on my exam.  Differential diagnosis includes but is not limited to cholelithiasis or symptomatic biliary colic, cholecystitis, worsening liver disease, ascites, UTI, pyelonephritis, kidney stone, ulcer, gastritis, bowel obstruction, bowel perforation, pancreatitis, portal vein thrombosis.  With his prior history of cholelithiasis we did decide to start with a right upper quadrant ultrasound to evaluate for signs of cholecystitis.  We obtained laboratory studies.  Initial lactic acid was slightly high at 2.7.  He denies any specific infectious symptoms and thus we felt this was likely to be dehydration he was given 500 mL of IV fluids.  On repeat after these fluids his lactic acid normalized to 1.4.  CBC revealed a normal blood cell count of 6.9 and a hemoglobin of 14.  With a normal white blood cell count and no fevers and no specific significant infectious symptoms we felt that the lactic acidosis was likely related to volume depletion and not sepsis at this time.  Coagulation studies are within normal limits.  Lipase is within normal  limits.  CMP revealed a glucose of 290 without any signs of DKA.  Bicarb was normal.  Electrolytes were normal.  Creatinine is within normal limits.  Bilirubin was normal at 1.2.  Alk phos is normal at 73.  ALT is 61 with an AST of 46.  Platelets are low at 83 however this appears to be near his baseline.  Urinalysis is unremarkable.  Troponin is less than 0.015.  EKG had been obtained which did not show any signs of acute ischemia but did have some Q waves in 3 and aVF that were not present in 2016.  With a normal troponin and no chest pain or anginal symptoms we felt that acute coronary syndrome was an unlikely cause of his chronic right upper quadrant pain.  He was given IV Zofran as well as IV Dilaudid with improvement.  Right upper quadrant ultrasound revealed cholelithiasis without signs of cholecystitis and no significant ascites.  As we do not have an exact cause of his pain at this time we did obtain a CT of the abdomen pelvis with IV contrast.  Sitter mesenteric ischemia with his slightly elevated lactic acid however this did resolve easily with fluids.  Would be somewhat of an odd presentation for many months however possible as this is after food.  No signs of acute mesenteric ischemia at this time.    This revealed an eccentric thrombus within the main portal vein extending into the superior mesenteric vein which is new from previous CT in 2017.  I talked to the radiologist and they were unsure how new this truly was.  This may be contributing to his chronic pain.  There was also mild fat stranding in the vicinity of the gallbladder and adjacent to the colon at the hepatic flexure.  This could represent cholecystitis however with the ultrasound not showing any findings of this it was felt to be somewhat less likely.  Could also potentially represent diverticulitis however this would be also somewhat difficult to parse out.  There was cirrhotic configuration of the liver and evidence of portal  hypertension including splenomegaly and esophageal varices.  I contacted the general surgery team who came to evaluate the patient for the potential of cholecystitis.  They agreed that this was unlikely to be the cause of his pain and that he should be admitted to the internal medicine service.  At this time, I have not yet started the patient on heparin as I do not know the exact cause of his symptoms and we will allow the inpatient team to sort through this.  I feel that diverticulitis is somewhat less likely without leukocytosis however this will need to be further evaluated as well.  We will hold on antibiotics at this point.  I spoke with the triage hospitalist who accepted the patient for admission and will continue to determine the best plan moving forward.  He understood I not yet started heparin reserve antibiotics.  Patient was in agreement with this plan.  He was admitted to the internal medicine service in stable condition.    I have reviewed the nursing notes. I have reviewed the findings, diagnosis, plan and need for follow up with the patient.    New Prescriptions    No medications on file       Final diagnoses:   RUQ abdominal pain   Hepatic vein thrombosis (H)   I, Sherine Lee, am serving as a trained medical scribe to document services personally performed by Nerissa Lee MD, based on the provider's statements to me.     I, Nerissa Lee MD, was physically present and have reviewed and verified the accuracy of this note documented by Sherine Lee.      --  Nerissa Lee MD  Emergency Medicine   [facility], [place], EMERGENCY DEPARTMENT  7/8/2020     Nerissa Lee MD  07/09/20 0248

## 2020-07-08 NOTE — ED TRIAGE NOTES
Pt. Presents to ED with complaints of RUQ abdominal pain, nausea, and diarrhea since February and feels as though its worsening. Pt. Reports he was diagnosed with cholecystitis this February. AVSS on RA. A & O x 4, independent.

## 2020-07-09 PROBLEM — R10.9 ABDOMINAL PAIN: Status: ACTIVE | Noted: 2020-07-09

## 2020-07-09 LAB
GLUCOSE BLDC GLUCOMTR-MCNC: 114 MG/DL (ref 70–99)
GLUCOSE BLDC GLUCOMTR-MCNC: 133 MG/DL (ref 70–99)
GLUCOSE BLDC GLUCOMTR-MCNC: 140 MG/DL (ref 70–99)
GLUCOSE BLDC GLUCOMTR-MCNC: 204 MG/DL (ref 70–99)
HBA1C MFR BLD: 7.5 % (ref 0–5.6)
INTERPRETATION ECG - MUSE: NORMAL
RADIOLOGIST FLAGS: ABNORMAL
SARS-COV-2 RNA SPEC QL NAA+PROBE: NOT DETECTED
SPECIMEN SOURCE: NORMAL

## 2020-07-09 PROCEDURE — 25000128 H RX IP 250 OP 636: Performed by: HOSPITALIST

## 2020-07-09 PROCEDURE — 12000026 ZZH R&B TRANSPLANT

## 2020-07-09 PROCEDURE — 96372 THER/PROPH/DIAG INJ SC/IM: CPT

## 2020-07-09 PROCEDURE — 25000132 ZZH RX MED GY IP 250 OP 250 PS 637: Performed by: EMERGENCY MEDICINE

## 2020-07-09 PROCEDURE — 25000131 ZZH RX MED GY IP 250 OP 636 PS 637: Performed by: HOSPITALIST

## 2020-07-09 PROCEDURE — 25800030 ZZH RX IP 258 OP 636: Performed by: HOSPITALIST

## 2020-07-09 PROCEDURE — 99220 ZZC INITIAL OBSERVATION CARE,LEVL III: CPT | Performed by: HOSPITALIST

## 2020-07-09 PROCEDURE — 36415 COLL VENOUS BLD VENIPUNCTURE: CPT | Performed by: HOSPITALIST

## 2020-07-09 PROCEDURE — G0378 HOSPITAL OBSERVATION PER HR: HCPCS

## 2020-07-09 PROCEDURE — 25000132 ZZH RX MED GY IP 250 OP 250 PS 637: Performed by: HOSPITALIST

## 2020-07-09 PROCEDURE — 25000132 ZZH RX MED GY IP 250 OP 250 PS 637: Performed by: INTERNAL MEDICINE

## 2020-07-09 PROCEDURE — 83036 HEMOGLOBIN GLYCOSYLATED A1C: CPT | Performed by: HOSPITALIST

## 2020-07-09 PROCEDURE — 00000146 ZZHCL STATISTIC GLUCOSE BY METER IP

## 2020-07-09 PROCEDURE — 25000132 ZZH RX MED GY IP 250 OP 250 PS 637: Performed by: PHYSICIAN ASSISTANT

## 2020-07-09 PROCEDURE — U0003 INFECTIOUS AGENT DETECTION BY NUCLEIC ACID (DNA OR RNA); SEVERE ACUTE RESPIRATORY SYNDROME CORONAVIRUS 2 (SARS-COV-2) (CORONAVIRUS DISEASE [COVID-19]), AMPLIFIED PROBE TECHNIQUE, MAKING USE OF HIGH THROUGHPUT TECHNOLOGIES AS DESCRIBED BY CMS-2020-01-R: HCPCS | Performed by: EMERGENCY MEDICINE

## 2020-07-09 RX ORDER — LACTOBACILLUS RHAMNOSUS GG 10B CELL
1 CAPSULE ORAL 2 TIMES DAILY
COMMUNITY
End: 2021-08-26

## 2020-07-09 RX ORDER — TRAMADOL HYDROCHLORIDE 50 MG/1
50 TABLET ORAL EVERY 6 HOURS PRN
Status: DISCONTINUED | OUTPATIENT
Start: 2020-07-09 | End: 2020-07-10 | Stop reason: HOSPADM

## 2020-07-09 RX ORDER — DEXTROSE MONOHYDRATE 25 G/50ML
25-50 INJECTION, SOLUTION INTRAVENOUS
Status: DISCONTINUED | OUTPATIENT
Start: 2020-07-09 | End: 2020-07-10 | Stop reason: HOSPADM

## 2020-07-09 RX ORDER — LOSARTAN POTASSIUM 50 MG/1
100 TABLET ORAL DAILY
Status: DISCONTINUED | OUTPATIENT
Start: 2020-07-09 | End: 2020-07-10 | Stop reason: HOSPADM

## 2020-07-09 RX ORDER — ACETAMINOPHEN 325 MG/1
650 TABLET ORAL EVERY 4 HOURS PRN
Status: DISCONTINUED | OUTPATIENT
Start: 2020-07-09 | End: 2020-07-10 | Stop reason: HOSPADM

## 2020-07-09 RX ORDER — AMOXICILLIN 250 MG
2 CAPSULE ORAL 2 TIMES DAILY PRN
Status: DISCONTINUED | OUTPATIENT
Start: 2020-07-09 | End: 2020-07-10 | Stop reason: HOSPADM

## 2020-07-09 RX ORDER — ACETAMINOPHEN 650 MG/1
650 SUPPOSITORY RECTAL EVERY 4 HOURS PRN
Status: DISCONTINUED | OUTPATIENT
Start: 2020-07-09 | End: 2020-07-10 | Stop reason: HOSPADM

## 2020-07-09 RX ORDER — HYDROCHLOROTHIAZIDE 25 MG/1
25 TABLET ORAL ONCE
Status: COMPLETED | OUTPATIENT
Start: 2020-07-09 | End: 2020-07-09

## 2020-07-09 RX ORDER — NICOTINE POLACRILEX 4 MG
15-30 LOZENGE BUCCAL
Status: DISCONTINUED | OUTPATIENT
Start: 2020-07-09 | End: 2020-07-10 | Stop reason: HOSPADM

## 2020-07-09 RX ORDER — SPIRONOLACTONE 25 MG/1
25 TABLET ORAL DAILY
Status: DISCONTINUED | OUTPATIENT
Start: 2020-07-09 | End: 2020-07-10 | Stop reason: HOSPADM

## 2020-07-09 RX ORDER — ONDANSETRON 4 MG/1
4 TABLET, ORALLY DISINTEGRATING ORAL EVERY 6 HOURS PRN
Status: DISCONTINUED | OUTPATIENT
Start: 2020-07-09 | End: 2020-07-10 | Stop reason: HOSPADM

## 2020-07-09 RX ORDER — METRONIDAZOLE 500 MG/1
500 TABLET ORAL EVERY 8 HOURS
Status: DISCONTINUED | OUTPATIENT
Start: 2020-07-09 | End: 2020-07-10 | Stop reason: HOSPADM

## 2020-07-09 RX ORDER — HYDROCHLOROTHIAZIDE 25 MG/1
25 TABLET ORAL DAILY
Status: DISCONTINUED | OUTPATIENT
Start: 2020-07-09 | End: 2020-07-09

## 2020-07-09 RX ORDER — NALOXONE HYDROCHLORIDE 0.4 MG/ML
.1-.4 INJECTION, SOLUTION INTRAMUSCULAR; INTRAVENOUS; SUBCUTANEOUS
Status: DISCONTINUED | OUTPATIENT
Start: 2020-07-09 | End: 2020-07-10 | Stop reason: HOSPADM

## 2020-07-09 RX ORDER — HYDROCHLOROTHIAZIDE 50 MG/1
50 TABLET ORAL DAILY
Status: DISCONTINUED | OUTPATIENT
Start: 2020-07-10 | End: 2020-07-10 | Stop reason: HOSPADM

## 2020-07-09 RX ORDER — HYDROCHLOROTHIAZIDE 25 MG/1
25 TABLET ORAL ONCE
Status: DISCONTINUED | OUTPATIENT
Start: 2020-07-09 | End: 2020-07-09

## 2020-07-09 RX ORDER — ONDANSETRON 2 MG/ML
4 INJECTION INTRAMUSCULAR; INTRAVENOUS EVERY 6 HOURS PRN
Status: DISCONTINUED | OUTPATIENT
Start: 2020-07-09 | End: 2020-07-10 | Stop reason: HOSPADM

## 2020-07-09 RX ORDER — CIPROFLOXACIN 500 MG/1
500 TABLET, FILM COATED ORAL EVERY 12 HOURS SCHEDULED
Status: DISCONTINUED | OUTPATIENT
Start: 2020-07-09 | End: 2020-07-10 | Stop reason: HOSPADM

## 2020-07-09 RX ORDER — HYDROCHLOROTHIAZIDE 12.5 MG/1
25 CAPSULE ORAL ONCE
Status: DISCONTINUED | OUTPATIENT
Start: 2020-07-09 | End: 2020-07-09

## 2020-07-09 RX ORDER — AMOXICILLIN 250 MG
1 CAPSULE ORAL 2 TIMES DAILY PRN
Status: DISCONTINUED | OUTPATIENT
Start: 2020-07-09 | End: 2020-07-10 | Stop reason: HOSPADM

## 2020-07-09 RX ORDER — SODIUM CHLORIDE, SODIUM LACTATE, POTASSIUM CHLORIDE, CALCIUM CHLORIDE 600; 310; 30; 20 MG/100ML; MG/100ML; MG/100ML; MG/100ML
INJECTION, SOLUTION INTRAVENOUS CONTINUOUS
Status: DISCONTINUED | OUTPATIENT
Start: 2020-07-09 | End: 2020-07-10

## 2020-07-09 RX ADMIN — METRONIDAZOLE 500 MG: 500 TABLET ORAL at 14:29

## 2020-07-09 RX ADMIN — LOSARTAN POTASSIUM 100 MG: 50 TABLET, FILM COATED ORAL at 08:03

## 2020-07-09 RX ADMIN — ACETAMINOPHEN 650 MG: 325 TABLET, FILM COATED ORAL at 08:25

## 2020-07-09 RX ADMIN — HYDROCHLOROTHIAZIDE 25 MG: 25 TABLET ORAL at 15:26

## 2020-07-09 RX ADMIN — SODIUM CHLORIDE, POTASSIUM CHLORIDE, SODIUM LACTATE AND CALCIUM CHLORIDE: 600; 310; 30; 20 INJECTION, SOLUTION INTRAVENOUS at 22:10

## 2020-07-09 RX ADMIN — CIPROFLOXACIN HYDROCHLORIDE 500 MG: 500 TABLET, FILM COATED ORAL at 20:30

## 2020-07-09 RX ADMIN — SPIRONOLACTONE 25 MG: 25 TABLET ORAL at 08:02

## 2020-07-09 RX ADMIN — METRONIDAZOLE 500 MG: 500 TABLET ORAL at 22:10

## 2020-07-09 RX ADMIN — Medication 1 MG: at 05:17

## 2020-07-09 RX ADMIN — METOPROLOL SUCCINATE 75 MG: 25 TABLET, EXTENDED RELEASE ORAL at 08:03

## 2020-07-09 RX ADMIN — INSULIN GLARGINE 5 UNITS: 100 INJECTION, SOLUTION SUBCUTANEOUS at 11:48

## 2020-07-09 RX ADMIN — TRAMADOL HYDROCHLORIDE 50 MG: 50 TABLET, FILM COATED ORAL at 14:29

## 2020-07-09 RX ADMIN — HYDROCHLOROTHIAZIDE 25 MG: 25 TABLET ORAL at 08:00

## 2020-07-09 RX ADMIN — OMEPRAZOLE 20 MG: 20 CAPSULE, DELAYED RELEASE ORAL at 08:00

## 2020-07-09 RX ADMIN — ONDANSETRON 4 MG: 4 TABLET, ORALLY DISINTEGRATING ORAL at 11:49

## 2020-07-09 RX ADMIN — TRAMADOL HYDROCHLORIDE 50 MG: 50 TABLET, FILM COATED ORAL at 20:30

## 2020-07-09 RX ADMIN — SODIUM CHLORIDE, POTASSIUM CHLORIDE, SODIUM LACTATE AND CALCIUM CHLORIDE: 600; 310; 30; 20 INJECTION, SOLUTION INTRAVENOUS at 08:05

## 2020-07-09 ASSESSMENT — PAIN DESCRIPTION - DESCRIPTORS
DESCRIPTORS: ACHING
DESCRIPTORS: ACHING

## 2020-07-09 ASSESSMENT — ACTIVITIES OF DAILY LIVING (ADL)
ADLS_ACUITY_SCORE: 9
ADLS_ACUITY_SCORE: 9

## 2020-07-09 ASSESSMENT — MIFFLIN-ST. JEOR: SCORE: 1991.43

## 2020-07-09 NOTE — CONSULTS
General Surgery Consultation Note  [facility]    Gucci Logan MRN# [id number]   Age: 70 year old YOB: 1950     Date of Admission:  7/8/2020    Reason for consult: Cholelithiasis on CT       Requesting physician: Dr. Lee       Level of consult: Consult, follow and place orders           Assessment and Recommendations   70 year old male PMH significant for DM2, HTN, GERD, HCV, cirrhosis of liver, and obesity who presents to the ED today with abdominal pain and diarrhea and found to have CT findings of diffuse diverticulum, cholelithiasis without signs of cholecystitis, and portal venous thrombosis.     - Admit to medicine for further w/u of new portal venous thrombosis  - GI consult  - There are no indications for surgery at this time.    Patient was seen and discussed with staff surgeon, Dr. Jensen.    Marta Murcia MD   General Surgery PGY2  [phone]            History of Present Illness:   CC: RUQ pain, nausea and diarrhea since February     70 year old male PMH significant for DM2, HTN, GERD, HCV, cirrhosis of liver, and obesity who presents to the ED today with abdominal pain and diarrhea. The patient complains of worsening RUQ abdominal pain, nausea, and diarrhea since February. Follow up HIDA was without evidence of acute or chronic cholecystitis. He reports that he was diagnosed with cholecystitis in February and no intervention was performed. He says that while the pain is present there are episodes of acute worsening. He has not had a change in appetite. He has a history of HCV which he acquired from the healthcare setting. CT findings for his abdominal were significant for diffuse diverticulum, cholelithiasis without signs of cholecystitis, and portal venous thrombosis. Patient is mildly tender throughout entire abdomen. Patient report pain is always present at baseline but worsens daily in the afternoon. He has had a prior inguinal hernia repair but  no other abdominal surgeries.        History is obtained from the patient            Past Medical History:     Past Medical History:   Diagnosis Date     Arthritis      Esophageal reflux 3/1/2014     Hearing problem      Hiatal hernia      Hypertension      Liver disease      Obesity 1/24/2013     Obstructive sleep apnea      Sleep apnea     Advised CPAP machine. Not keen to use it.              Past Surgical History:     Past Surgical History:   Procedure Laterality Date     ARTHROSCOPY KNEE RT/LT      (L) with partial medial meniscectomy     C OPEN RX ANKLE DISLOCATN+FIXATN      (R)     C SPINAL FUSION,ANT,EA ADNL LEVEL      T12 - L1     CATARACT IOL, RT/LT  Nov and Dec 2017     COLONOSCOPY N/A 4/24/2019    Procedure: COLONOSCOPY, WITH POLYPECTOMY AND BIOPSY;  Surgeon: Leventhal, Thomas Michael, MD;  Location: [facility] OR     DACRYOCYSTORHINOSTOMY Left 10/16/2018    Procedure: DACRYOCYSTORHINOSTOMY;  Surgeon: Madhu Krause MD;  Location: [facility]     ENDOSCOPIC ENDONASAL SURGERY  1994     ENDOSCOPY  2-19-15     ESOPHAGOSCOPY, GASTROSCOPY, DUODENOSCOPY (EGD), COMBINED N/A 4/24/2019    Procedure: COMBINED ESOPHAGOSCOPY, GASTROSCOPY, DUODENOSCOPY (EGD) - hold aspirin ibuprofen or naproxen for one week prior (per physician order);  Surgeon: Leventhal, Thomas Michael, MD;  Location: [facility] OR     EYE SURGERY       Hernia surgery Left 1994     NASAL/SINUS POLYPECTOMY       REPAIR PTOSIS Left 10/16/2018    Procedure: LEFT UPPER LID PTOSIS AND BILATERAL  BROW PTOSIS REPAIR WITH LEFT DACRYOCYSTORHINOSTOMY ;  Surgeon: Madhu Krause MD;  Location: [facility]     REPAIR PTOSIS BROW Bilateral 10/16/2018    Procedure: REPAIR PTOSIS BROW;  Surgeon: Madhu Krause MD;  Location: [facility]     ROTATOR CUFF REPAIR RT/LT Right              Social History:     Social History     Socioeconomic History     Marital status: Single     Spouse name: Not on file     Number of children: 0     Years of education: 18     Highest education  level: Not on file   Occupational History     Occupation: Contractor     Employer: SELF     Comment: Not working now   Social Needs     Financial resource strain: Not on file     Food insecurity     Worry: Not on file     Inability: Not on file     Transportation needs     Medical: Not on file     Non-medical: Not on file   Tobacco Use     Smoking status: Former Smoker     Packs/day: 0.00     Years: 5.00     Pack years: 0.00     Types: Cigarettes     Start date: 1990     Last attempt to quit: 1999     Years since quittin.5     Smokeless tobacco: Never Used   Substance and Sexual Activity     Alcohol use: No     Comment: quit in      Drug use: No     Sexual activity: Not Currently     Partners: Female   Lifestyle     Physical activity     Days per week: Not on file     Minutes per session: Not on file     Stress: Not on file   Relationships     Social connections     Talks on phone: Not on file     Gets together: Not on file     Attends Hindu service: Not on file     Active member of club or organization: Not on file     Attends meetings of clubs or organizations: Not on file     Relationship status: Not on file     Intimate partner violence     Fear of current or ex partner: Not on file     Emotionally abused: Not on file     Physically abused: Not on file     Forced sexual activity: Not on file   Other Topics Concern     Parent/sibling w/ CABG, MI or angioplasty before 65F 55M? No   Social History Narrative     Not on file             Family History:     Family History   Problem Relation Age of Onset     Alzheimer Disease Mother 85     Dementia Mother      Cerebrovascular Disease Father 50     Cancer Father      Hypertension Father      Neurologic Disorder No family hx of      Diabetes No family hx of              Allergies:      Allergies   Allergen Reactions     Estradiol Other (See Comments)     Delirium, fever  PN: vaccine (?)     Haemophilus Influenzae Nausea and Other (See Comments)      <<-----Click on this checkbox to enter Pre-Op Dx PN: delirium  fever  PN: delirium  fever       Influenza Vaccines Other (See Comments)     PN: delirium  fever  Delirium, fever,       Thimerosal Other (See Comments)     Delirium, fever             Medications:   No current facility-administered medications on file prior to encounter.   alcohol swab prep pads, Use to swab area of injection/jose antonio as directed.  [] amoxicillin (AMOXIL) 500 MG capsule, Take 1 capsule (500 mg) by mouth 3 times daily for 10 days  blood glucose (NO BRAND SPECIFIED) test strip, Use to test blood sugar 1 times daily or as directed. To accompany: Blood Glucose Monitor Brands: per insurance.  blood glucose calibration (NO BRAND SPECIFIED) solution, To accompany: Blood Glucose Monitor Brands: per insurance.  blood glucose monitoring (NO BRAND SPECIFIED) meter device kit, Use to test blood sugar 1 times daily or as directed. Preferred blood glucose meter OR supplies to accompany: Blood Glucose Monitor Brands: per insurance.  Cholecalciferol (VITAMIN D) 2000 UNITS CAPS, 1 po daily  EPINEPHrine (EPIPEN/ADRENACLICK/OR ANY BX GENERIC EQUIV) 0.3 MG/0.3ML injection 2-pack, Inject 0.3 mLs (0.3 mg) into the muscle as needed for anaphylaxis  hydrochlorothiazide (HYDRODIURIL) 25 MG tablet, Take 1 tablet (25 mg) by mouth daily  HYDROcodone-acetaminophen (NORCO) 5-325 MG tablet, Take 1 tablet by mouth every 6 hours as needed for moderate to severe pain  losartan (COZAAR) 100 MG tablet, Take 1 tablet (100 mg) by mouth daily  metFORMIN (GLUCOPHAGE) 500 MG tablet, Take 1 tablet (500 mg) by mouth 2 times daily (with meals)  metoprolol succinate ER (TOPROL-XL) 50 MG 24 hr tablet, 1 1/2 po daily  Multiple Vitamins-Minerals (MULTIVITAMIN ADULT PO),   omeprazole (PRILOSEC) 20 MG DR capsule, TAKE 1 CAPSULE BY MOUTH ONCE DAILY 30 TO 60 MINUTES BEFORE A MEAL  spironolactone (ALDACTONE) 25 MG tablet, Take 1 tablet (25 mg) by mouth daily  thin (NO BRAND SPECIFIED) lancets, Use with lanceting device. To  "accompany: Blood Glucose Monitor Brands: per insurance.              Review of Systems:      All other review of systems negative, except for what is mentioned above        Physical Exam:   /59   Pulse 62   Temp 97.8  F (36.6  C) (Oral)   Resp 16   Ht 1.803 m (5' 11\")   Wt 122.9 kg (271 lb)   SpO2 93%   BMI 37.80 kg/m    General: Alert, interactive, NAD  Resp: CTAB, no crackles or wheezes  Cardiac: RRR, NS1,S2, No m/r/g  Abdomen: Soft, minimally TTP diffusely , nondistended, no rebound or guarding.  Extremities: No LE edema or obvious joint abnormalities  Skin: Warm and dry, no jaundice or rash  Neuro: A&Ox3, CN 2-12 intact, CARLSON            Data:     Results for orders placed or performed during the hospital encounter of 07/08/20 (from the past 24 hour(s))   Comprehensive metabolic panel   Result Value Ref Range    Sodium 140 133 - 144 mmol/L    Potassium 3.9 3.4 - 5.3 mmol/L    Chloride 108 94 - 109 mmol/L    Carbon Dioxide 26 20 - 32 mmol/L    Anion Gap 6 3 - 14 mmol/L    Glucose 290 (H) 70 - 99 mg/dL    Urea Nitrogen 18 7 - 30 mg/dL    Creatinine 1.09 0.66 - 1.25 mg/dL    GFR Estimate 69 >60 mL/min/[1.73_m2]    GFR Estimate If Black 80 >60 mL/min/[1.73_m2]    Calcium 8.5 8.5 - 10.1 mg/dL    Bilirubin Total 1.2 0.2 - 1.3 mg/dL    Albumin 3.4 3.4 - 5.0 g/dL    Protein Total 7.1 6.8 - 8.8 g/dL    Alkaline Phosphatase 73 40 - 150 U/L    ALT 61 0 - 70 U/L    AST 46 (H) 0 - 45 U/L   Lipase   Result Value Ref Range    Lipase 185 73 - 393 U/L   CBC with platelets differential   Result Value Ref Range    WBC 6.9 4.0 - 11.0 10e9/L    RBC Count 4.38 (L) 4.4 - 5.9 10e12/L    Hemoglobin 14.0 13.3 - 17.7 g/dL    Hematocrit 43.6 40.0 - 53.0 %     78 - 100 fl    MCH 32.0 26.5 - 33.0 pg    MCHC 32.1 31.5 - 36.5 g/dL    RDW 13.1 10.0 - 15.0 %    Platelet Count 83 (L) 150 - 450 10e9/L    Diff Method Manual Differential     % Neutrophils 86.1 %    % Lymphocytes 7.0 %    % Monocytes 5.2 %    % Eosinophils 1.7 %    " % Basophils 0.0 %    Absolute Neutrophil 5.9 1.6 - 8.3 10e9/L    Absolute Lymphocytes 0.5 (L) 0.8 - 5.3 10e9/L    Absolute Monocytes 0.4 0.0 - 1.3 10e9/L    Absolute Eosinophils 0.1 0.0 - 0.7 10e9/L    Absolute Basophils 0.0 0.0 - 0.2 10e9/L    RBC Morphology Normal     Platelet Estimate Confirming automated cell count    Partial thromboplastin time   Result Value Ref Range    PTT 30 22 - 37 sec   INR   Result Value Ref Range    INR 1.14 0.86 - 1.14   ABO/Rh type and screen   Result Value Ref Range    ABO A     RH(D) Pos     Antibody Screen Neg     Test Valid Only At          [facility]    Specimen Expires 07/11/2020    Lactic acid whole blood   Result Value Ref Range    Lactic Acid 2.7 (H) 0.7 - 2.0 mmol/L   Troponin I   Result Value Ref Range    Troponin I ES <0.015 0.000 - 0.045 ug/L   UA with Microscopic   Result Value Ref Range    Color Urine Yellow     Appearance Urine Clear     Glucose Urine 300 (A) NEG^Negative mg/dL    Bilirubin Urine Negative NEG^Negative    Ketones Urine Negative NEG^Negative mg/dL    Specific Gravity Urine 1.019 1.003 - 1.035    Blood Urine Negative NEG^Negative    pH Urine 6.0 5.0 - 7.0 pH    Protein Albumin Urine Negative NEG^Negative mg/dL    Urobilinogen mg/dL 2.0 0.0 - 2.0 mg/dL    Nitrite Urine Negative NEG^Negative    Leukocyte Esterase Urine Negative NEG^Negative    Source Urine     WBC Urine 0 0 - 5 /HPF    RBC Urine 0 0 - 2 /HPF    Mucous Urine Present (A) NEG^Negative /LPF   EKG 12 lead   Result Value Ref Range    Interpretation ECG Click View Image link to view waveform and result    Abdomen US, limited (RUQ only)    Narrative    EXAMINATION: Limited Abdominal Ultrasound, 7/8/2020 6:32 PM     COMPARISON: Ultrasound 11/27/2017    HISTORY: Evaluate for gallbladder pathology, liver pathology, ascites    FINDINGS:   Fluid: No evidence of ascites or pleural effusions.    Liver: The liver parenchyma is coarsened with peripheral  nodularity,  measuring 18.2 cm in craniocaudal dimension. There is no focal mass.     Gallbladder: Multiple mobile shadowing gallstones. No gallbladder wall  thickening, pericholecystic fluid or sonographic Alcantara's sign.    Bile Ducts: Both the intra- and extrahepatic biliary system are of  normal caliber.  The common bile duct measures 6 mm in diameter.    Pancreas: Pancreas is largely obscured by overlying bowel gas.    Kidney: The right kidney measures 12.0 cm long. There is no  hydronephrosis or hydroureter, no shadowing renal calculi, cystic  lesion or mass.       Impression    IMPRESSION:   1.  Cholelithiasis, without additional sonographic findings to suggest  acute cholecystitis.  2.   Cirrhotic configuration of liver. No focal liver lesion.    I have personally reviewed the examination and initial interpretation  and I agree with the findings.    MATTEO LEMOS MD   Lactic acid   Result Value Ref Range    Lactic Acid 1.4 0.7 - 2.0 mmol/L   CT Abdomen Pelvis w Contrast   Result Value Ref Range    Radiologist flags New portal vein/SMV thrombus (Urgent)     Narrative    EXAMINATION: CT ABDOMEN PELVIS W CONTRAST, 7/8/2020 9:13 PM    TECHNIQUE:  Helical CT images from the lung bases through the  symphysis pubis were obtained with IV contrast. Contrast dose:  iopamidol (ISOVUE-370) solution 135 mL    COMPARISON: CT abdomen pelvis 11/27/2017    HISTORY: Right upper quadrant pain, history of cirrhosis, evaluate for  pathology    FINDINGS:    Abdomen and pelvis: Cirrhotic configuration of liver. No focal liver  lesions. Multiple layering stones in the gallbladder. No intrahepatic  or extrahepatic biliary dilatation. The pancreas is unremarkable.  Spleen is enlarged and measures 14.6 cm in craniocaudal dimension.  Adrenal glands are unremarkable. Normal symmetric enhancement of both  kidneys. 4.6 cm exophytic cyst arising from the lateral mid left  kidney, previously measured 4.0 cm on 11/27/2017. Additional  2.0 cm  exophytic cyst the lower pole the left kidney, and 1.3 cm cyst in the  anterior mid right kidney. Several other subcentimeter hypodensities  in both kidneys are too small to characterize, favored to represent  additional cysts. No hydronephrosis or hydroureter. No renal or  ureteral stones. The urinary bladder, prostate and seminal vesicles  are unremarkable. Small bilateral fat-containing inguinal hernias,  left greater than right. No abnormally dilated loops of small or large  bowel. Colonic diverticulosis, without convincing evidence of acute  diverticulitis. Unremarkable appendix. Tiny fat-containing umbilical  hernia. Mild fat stranding is seen adjacent to the gallbladder and  along the colon at the hepatic flexure (series 5, image 147)    There is eccentric thrombus within the main portal vein extending into  the superior mesenteric vein (series 5, images 136-184), which is new  from 11/27/2017. Esophageal varices. Abdominal aorta is normal in  caliber. Several prominent portacaval lymph nodes measuring up to 10  mm in short axis, similar to prior. No other enlarged lymph nodes in  the abdomen or pelvis.    Lung bases: The heart is not enlarged. No pericardial effusion. The  lung bases are clear.    Bones and soft tissues: Posterior fusion instrumentation of the  posterior elements spanning T11/L2. Multilevel degenerative changes of  the spine. No acute or suspicious osseous lesions.      Impression    IMPRESSION:   1. There is eccentric thrombus within the main portal vein, extending  into the superior mesenteric vein, which is new from the previous CT  11/27/2017.  2. There is mild fat stranding in the vicinity of the gallbladder and  adjacent to the colon at the hepatic flexure. The same-day ultrasound  is not suggestive of acute cholecystitis. Findings could represent  diverticulitis, although correlation with patient's symptoms is  recommended.  3. Cirrhotic configuration of liver and evidence of  portal  hypertension including splenomegaly and esophageal varices.    [Urgent Result: New portal vein/SMV thrombus]    Finding was identified on 7/8/2020 8:57 PM.     [facility] Rocha at 7/8/2020 9:14 PM and verbalized  understanding of the urgent finding.     I have personally reviewed the examination and initial interpretation  and I agree with the findings.    MATTEO LEMOS MD

## 2020-07-09 NOTE — PHARMACY-ADMISSION MEDICATION HISTORY
Admission medication history interview status for the 7/8/2020 admission is complete. See Epic admission navigator for allergy information, pharmacy, prior to admission medications and immunization status.     Medication history interview sources:   Patient via telephone, [facility]    Changes made to PTA medication list (reason)  Added: Cholecalciferol, glutamine, probiotic  Deleted: Hydrocodone - acetaminophen  Changed:  Hydrochlorothiazide dose increased to 50mg from 25mg    Additional medication history information (including reliability of information, actions taken by pharmacist):  Patient was a good historian.  Patient reports current hydrochlorothiazide dose is 50mg for improved BP control.      Prior to Admission medications    Medication Sig Last Dose Taking? Auth Provider   cholecalciferol 25 MCG (1000 UT) TABS Take 1,000 Units by mouth daily 7/8/2020 at Unknown time Yes Unknown, Entered By History   glutamine 500 MG capsule Take 500 mg by mouth daily 7/8/2020 at Unknown time Yes Unknown, Entered By History   hydrochlorothiazide (HYDRODIURIL) 25 MG tablet Take 1 tablet (25 mg) by mouth daily  Patient taking differently: Take 50 mg by mouth daily  7/8/2020 at Unknown time Yes Richi Jaramillo MD   lactobacillus rhamnosus, GG, (CULTURELL) capsule Take 1 capsule by mouth 2 times daily 7/8/2020 at Unknown time Yes Unknown, Entered By History   losartan (COZAAR) 100 MG tablet Take 1 tablet (100 mg) by mouth daily 7/8/2020 at Unknown time Yes Richi Jaramillo MD   metFORMIN (GLUCOPHAGE) 500 MG tablet Take 1 tablet (500 mg) by mouth 2 times daily (with meals) 7/8/2020 at Unknown time Yes Richi Jaramillo MD   metoprolol succinate ER (TOPROL-XL) 50 MG 24 hr tablet 1 1/2 po daily  Patient taking differently: Take 75 mg by mouth daily  7/8/2020 at Unknown time Yes Richi Jaramillo MD   Multiple Vitamins-Minerals (MULTIVITAMIN ADULT PO) Take 1 tablet by mouth daily  7/8/2020 at Unknown time Yes Reported,  Patient   omeprazole (PRILOSEC) 20 MG DR capsule TAKE 1 CAPSULE BY MOUTH ONCE DAILY 30 TO 60 MINUTES BEFORE A MEAL 7/8/2020 at Unknown time Yes Gutierrez Vasquez MD   spironolactone (ALDACTONE) 25 MG tablet Take 1 tablet (25 mg) by mouth daily 7/8/2020 at Unknown time Yes Richi Jaramillo MD   alcohol swab prep pads Use to swab area of injection/jose antonio as directed.   Richi Jaramillo MD   blood glucose (NO BRAND SPECIFIED) test strip Use to test blood sugar 1 times daily or as directed. To accompany: Blood Glucose Monitor Brands: per insurance.   Richi Jaramillo MD   blood glucose calibration (NO BRAND SPECIFIED) solution To accompany: Blood Glucose Monitor Brands: per insurance.   Richi Jaramillo MD   blood glucose monitoring (NO BRAND SPECIFIED) meter device kit Use to test blood sugar 1 times daily or as directed. Preferred blood glucose meter OR supplies to accompany: Blood Glucose Monitor Brands: per insurance.   Richi Jaramillo MD   thin (NO BRAND SPECIFIED) lancets Use with lanceting device. To accompany: Blood Glucose Monitor Brands: per insurance.   Richi Jaramillo MD         Medication history completed by:  Chastity Bellamy, PharmD.

## 2020-07-09 NOTE — H&P
[facility], [place]    History and Physical - Hospitalist Service, [facility]       Date of Admission:  7/8/2020    Assessment & Plan   Gucci Logan is a 70 year old male admitted on 7/8/2020. He has a prior history of diabetes (last A1c 4/20 greater than 9), hypertension, GERD and cirrhosis among others presented to ED with abdominal pain and loose stools.  Symptoms have been going on since February. CT scan showing portal thrombus, multiple layering stones in the gallbladder and cirrhotic liver.      Abdominal pain  Portal vein thrombus  Unclear etiology of abdominal pain at this time.  Does have CT evidence of portal vein thrombus.  Does have cholelithiasis without clinical or laboratory signs of obstructive or infectious pathology.  Patient does have poorly controlled diabetes with last A1c in 4/20 of greater than 9.  Reasonable to add gastroparesis in addition to portal vein thrombus or gallbladder as etiology of ongoing pain.    - IV fluids, pain control  - Gastroenterology input for etiology of pain and management of portal vein thrombus    Diabetes mellitus  Patient has poorly controlled diabetes.  Currently on metformin but per patient he is not compliant.  Reasonable to discuss initiation of insulin and hypoglycemic regimen  -Hold metformin while inpatient.  Start Lantus 5 units with insulin sliding scale and adjust as needed  -Discuss with patient starting insulin after repeat A1c will consider diabetic educator consult     Hypertension  Patient has elevated blood pressure currently on discussed hydrochlorothiazide 25 mg, losartan 100 mg, spironolactone 25 mg and metoprolol 75 mg  -Continue to monitor blood pressure if remains elevated will increase spironolactone    GERD.    -Continue home medications currently on Prilosec 20 mg.         Diet:     DVT Prophylaxis: Ambulate every shift currently patient under observation if stays will consider prophylactic  anticoagulation.  Also depends on GI suggestion for portal vein thrombus management.  Rankin Catheter: not present  Code Status:   Full         Disposition Plan   Expected discharge: 2 - 3 days, recommended to prior living arrangement once adequate pain management/ tolerating PO medications.  Entered: Godwin Webb MD 07/09/2020, 7:24 AM     The patient's care was discussed with the Patient.    Godwin Webb MD  [facility], [place]  Pager: [phone]  Please see sticky note for cross cover information  ______________________________________________________________________    Chief Complaint   Abdominal pain    History is obtained from the patient    History of Present Illness   Gucci Logan is a 70 year old male who has a prior history of diabetes (last A1c 4/20 greater than 9), hypertension, GERD and cirrhosis among others presented to ED with abdominal pain and loose stools.  Symptoms have been going on since February.  He has been evaluated with CT scan, ultrasound and HIDA scan looking for gallbladder pathology but not found although he has cholelithiasis.  Patient is started metformin for diabetes but symptoms predates that.  Denies history of ulcer or NSAID use.  Pain is not particularly associated with eating.      Review of Systems    The 10 point Review of Systems is negative other than noted in the HPI or here.     Past Medical History    I have reviewed this patient's medical history and updated it with pertinent information if needed.   Past Medical History:   Diagnosis Date     Arthritis      Esophageal reflux 3/1/2014     Hearing problem      Hiatal hernia      Hypertension      Liver disease      Obesity 1/24/2013     Obstructive sleep apnea      Sleep apnea     Advised CPAP machine. Not keen to use it.        Past Surgical History   I have reviewed this patient's surgical history and updated it with pertinent information if needed.  Past Surgical History:    Procedure Laterality Date     ARTHROSCOPY KNEE RT/LT      (L) with partial medial meniscectomy     C OPEN RX ANKLE DISLOCATN+FIXATN      (R)     C SPINAL FUSION,ANT,EA ADNL LEVEL      T12 - L1     CATARACT IOL, RT/LT  Nov and Dec 2017     COLONOSCOPY N/A 4/24/2019    Procedure: COLONOSCOPY, WITH POLYPECTOMY AND BIOPSY;  Surgeon: Leventhal, Thomas Michael, MD;  Location: [facility] OR     DACRYOCYSTORHINOSTOMY Left 10/16/2018    Procedure: DACRYOCYSTORHINOSTOMY;  Surgeon: Madhu Krause MD;  Location: [facility]     ENDOSCOPIC ENDONASAL SURGERY  1994     ENDOSCOPY  2-19-15     ESOPHAGOSCOPY, GASTROSCOPY, DUODENOSCOPY (EGD), COMBINED N/A 4/24/2019    Procedure: COMBINED ESOPHAGOSCOPY, GASTROSCOPY, DUODENOSCOPY (EGD) - hold aspirin ibuprofen or naproxen for one week prior (per physician order);  Surgeon: Leventhal, Thomas Michael, MD;  Location:  OR     EYE SURGERY       Hernia surgery Left 1994     NASAL/SINUS POLYPECTOMY       REPAIR PTOSIS Left 10/16/2018    Procedure: LEFT UPPER LID PTOSIS AND BILATERAL  BROW PTOSIS REPAIR WITH LEFT DACRYOCYSTORHINOSTOMY ;  Surgeon: Madhu Krause MD;  Location: [facility]     REPAIR PTOSIS BROW Bilateral 10/16/2018    Procedure: REPAIR PTOSIS BROW;  Surgeon: Madhu Krause MD;  Location: [facility]     ROTATOR CUFF REPAIR RT/LT Right        Social History   I have reviewed this patient's social history and updated it with pertinent information if needed.      Family History   I have reviewed this patient's family history and updated it with pertinent information if needed.  Family History   Problem Relation Age of Onset     Alzheimer Disease Mother 85     Dementia Mother      Cerebrovascular Disease Father 50     Cancer Father      Hypertension Father      Neurologic Disorder No family hx of      Diabetes No family hx of        Prior to Admission Medications   Prior to Admission Medications   Prescriptions Last Dose Informant Patient Reported? Taking?   HYDROcodone-acetaminophen  (NORCO) 5-325 MG tablet   No No   Sig: Take 1 tablet by mouth every 6 hours as needed for moderate to severe pain   Multiple Vitamins-Minerals (MULTIVITAMIN ADULT PO)   Yes No   alcohol swab prep pads   No No   Sig: Use to swab area of injection/jose antonio as directed.   amoxicillin (AMOXIL) 500 MG capsule   No No   Sig: Take 1 capsule (500 mg) by mouth 3 times daily for 10 days   blood glucose (NO BRAND SPECIFIED) test strip   No No   Sig: Use to test blood sugar 1 times daily or as directed. To accompany: Blood Glucose Monitor Brands: per insurance.   blood glucose calibration (NO BRAND SPECIFIED) solution   No No   Sig: To accompany: Blood Glucose Monitor Brands: per insurance.   blood glucose monitoring (NO BRAND SPECIFIED) meter device kit   No No   Sig: Use to test blood sugar 1 times daily or as directed. Preferred blood glucose meter OR supplies to accompany: Blood Glucose Monitor Brands: per insurance.   hydrochlorothiazide (HYDRODIURIL) 25 MG tablet   No No   Sig: Take 1 tablet (25 mg) by mouth daily   losartan (COZAAR) 100 MG tablet   No No   Sig: Take 1 tablet (100 mg) by mouth daily   metFORMIN (GLUCOPHAGE) 500 MG tablet   No No   Sig: Take 1 tablet (500 mg) by mouth 2 times daily (with meals)   metoprolol succinate ER (TOPROL-XL) 50 MG 24 hr tablet   No No   Si 1/2 po daily   omeprazole (PRILOSEC) 20 MG DR capsule   No No   Sig: TAKE 1 CAPSULE BY MOUTH ONCE DAILY 30 TO 60 MINUTES BEFORE A MEAL   spironolactone (ALDACTONE) 25 MG tablet   No No   Sig: Take 1 tablet (25 mg) by mouth daily   thin (NO BRAND SPECIFIED) lancets   No No   Sig: Use with lanceting device. To accompany: Blood Glucose Monitor Brands: per insurance.      Facility-Administered Medications: None     Allergies   Allergies   Allergen Reactions     Estradiol Other (See Comments)     Delirium, fever  PN: vaccine (?)     Haemophilus Influenzae Nausea and Other (See Comments)     PN: delirium  fever  PN: delirium  fever       Influenza  Vaccines Other (See Comments)     PN: delirium  fever  Delirium, fever,       Thimerosal Other (See Comments)     Delirium, fever       Physical Exam   Vital Signs: Temp: 98  F (36.7  C) Temp src: Oral BP: (!) 148/69 Pulse: 62 Heart Rate: 60 Resp: 16 SpO2: 95 % O2 Device: None (Room air)    Weight: 265 lbs 8 oz    General Appearance: Pleasant, NAD  Eyes: PERRLA  HEENT: ATNC  Respiratory: CTA B/L, no crackles or wheezing  Cardiovascular: S1, S2, no gallop  GI: Soft, mild B/L UQ tenderness, ND, Bowel sounds normal in all quadrants  Lymph/Hematologic: Normal  Genitourinary: Normal  Skin: No rashes  Musculoskeletal: Normal  Neurologic: Gross motor normal  Psychiatric: AAA X 3, normal mood and affect      Data   Data reviewed today: I reviewed all medications, new labs and imaging results over the last 24 hours. I personally reviewed the abdominal CT image(s) showing portal vein thrombus.    Recent Labs   Lab 07/08/20  1719   WBC 6.9   HGB 14.0      PLT 83*   INR 1.14      POTASSIUM 3.9   CHLORIDE 108   CO2 26   BUN 18   CR 1.09   ANIONGAP 6   SOLEDAD 8.5   *   ALBUMIN 3.4   PROTTOTAL 7.1   BILITOTAL 1.2   ALKPHOS 73   ALT 61   AST 46*   LIPASE 185   TROPI <0.015

## 2020-07-09 NOTE — CONSULTS
HEPATOLOGY CONSULTATION      Date of Admission:  7/8/2020          ASSESSMENT AND RECOMMENDATIONS:     Gucci Logan is a 70 year old male with a medical history as documented below, but pertinent for cirrhosis that is well compensated; admitted into the hospital for acute on chronic abdominal pain; and being seen by hepatology service for newly diagnosed portal venous thrombosis.     #. Portal venous thrombosis  New diagnosis of portal venous thrombosis with extension into the superior mesenteric vein.  Do not think this is the cause of abdominal pain given that his pain has been chronic, and patient has other reasons for right upper quadrant pain [hepatic flexure diverticulitis and possible symptomatic gallstones].  Given extension of portal venous clot into the superior mesenteric vein, patient may benefit from anticoagulation,, but this may also resolve without any intervention.  The patient does not have any prior history of venous thrombosis, and cirrhosis is an important risk for portal venous thrombosis [of note inflammation in the hepatic flexure may be precipitating factor as well], so he does not need work-up for hypercoagulable state.    #. Diverticulitis  Diagnosed on CT scan, without any significant fevers, or leukocytosis.  Last colonoscopy was done 4/2019 with removal of tubular adenomas and hyperplastic polyps as well as findings of diverticulosis.  Will benefit from antibiotic course.    #. Cirrhosis:  Compensated disease, liver biopsy 05/2008.  Etiology: HCV [diagnosed 2004, status post SVR], possible component of nonalcoholic fatty liver disease given presence of central obesity, hypertension, diabetes  MELD-Na of 9 (<2% 90-day mortality), Child-Guy-Jay score of 6 (Class A)  No evidence of hepatic encephalopathy, ascites, history of TIPS, last upper endoscopy was 9/2011 with no esophageal varices, gastric varices.  There was moderate portal hypertensive gastropathy found however.   No evidence of HCC on CT scan obtained in this admission.  No discussion of liver transplant yet given very low meld and well compensated disease.    RECOMMENDATIONS:  -- no indication for therapeutic anticoagulation  -- 7-day course of Ciprofloxacin and Metronidazole  -- will arrange follow up for cirrhosis care    Thank you for involving us in this patient's care. Please do not hesitate to contact the GI service with any questions or concerns.     Patient care plan discussed with Dr. Keating, GI staff physician.    Sunil Briggs MD  Transplant Hepatology Fellow  PGY 6 ([phone])            Chief Complaint:   We were asked by Lashonda Owens of Medicine to evaluate this patient with PVT    History is obtained from the patient and the medical record.          History of Present Illness:   Gucci Logan is a 70 year old male with a medical history as documented below, but pertinent for cirrhosis that is well compensated; admitted into the hospital for acute on chronic abdominal pain; and being seen by hepatology service for newly diagnosed portal venous thrombosis.  Patient was admitted and given abdominal pain that has been going on for the last 5 months, that is intermittent, and has slowly worsened.  He has not had any  nausea vomiting, confusion, hematemesis or melena.  No fevers or chills.  Patient has a history of hepatitis C infection [genotype 1a] with confirmed liver cirrhosis on liver biopsy in May 2008.  Per notes from [facility], he had a history of IV cocaine use in the 1980s and this is thought to be the source of infection.  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  Hepatitis C was diagnosed in 2004, and he was treated with ribavirin, interferon, and achieved sustained virological response.  His cirrhosis has been stable, and he has had no evidence of decompensation.            Past  Medical History:   Reviewed and edited as appropriate  Past Medical History:   Diagnosis Date    Arthritis     Esophageal reflux 3/1/2014    Hearing problem     Hiatal hernia     Hypertension     Liver disease     Obesity 1/24/2013    Obstructive sleep apnea     Sleep apnea     Advised CPAP machine. Not keen to use it.           Past Surgical History:   Reviewed and edited as appropriate   Past Surgical History:   Procedure Laterality Date    ARTHROSCOPY KNEE RT/LT      (L) with partial medial meniscectomy    C OPEN RX ANKLE DISLOCATN+FIXATN      (R)    C SPINAL FUSION,ANT,EA ADNL LEVEL      T12 - L1    CATARACT IOL, RT/LT  Nov and Dec 2017    COLONOSCOPY N/A 4/24/2019    Procedure: COLONOSCOPY, WITH POLYPECTOMY AND BIOPSY;  Surgeon: Leventhal, Thomas Michael, MD;  Location: [facility] OR    DACRYOCYSTORHINOSTOMY Left 10/16/2018    Procedure: DACRYOCYSTORHINOSTOMY;  Surgeon: Madhu Krause MD;  Location: [facility]    ENDOSCOPIC ENDONASAL SURGERY  1994    ENDOSCOPY  2-19-15    ESOPHAGOSCOPY, GASTROSCOPY, DUODENOSCOPY (EGD), COMBINED N/A 4/24/2019    Procedure: COMBINED ESOPHAGOSCOPY, GASTROSCOPY, DUODENOSCOPY (EGD) - hold aspirin ibuprofen or naproxen for one week prior (per physician order);  Surgeon: Leventhal, Thomas Michael, MD;  Location: [facility] OR    EYE SURGERY      Hernia surgery Left 1994    NASAL/SINUS POLYPECTOMY      REPAIR PTOSIS Left 10/16/2018    Procedure: LEFT UPPER LID PTOSIS AND BILATERAL  BROW PTOSIS REPAIR WITH LEFT DACRYOCYSTORHINOSTOMY ;  Surgeon: Madhu Krause MD;  Location: [facility]    REPAIR PTOSIS BROW Bilateral 10/16/2018    Procedure: REPAIR PTOSIS BROW;  Surgeon: Madhu Krause MD;  Location: [facility]    ROTATOR CUFF REPAIR RT/LT Right             Previous Endoscopy:     Results for orders placed or performed during the hospital encounter of 04/24/19   COLONOSCOPY   Result Value Ref Range    COLONOSCOPY       [facility]  [address] [place] [id number] [phone]      Endoscopy Department  _______________________________________________________________________________  Patient Name: Gucci Logan       Procedure Date: 4/24/2019 7:56 AM  MRN: [id number]                       Account Number: [id number]  YOB: 1950              Admit Type: Outpatient  Age: 68                                Gender: Male  Note Status: Finalized                Attending MD: Thomas M Leventhal , MD  Total Sedation Time:                    _______________________________________________________________________________     Procedure:           Colonoscopy  Indications:         High risk colon cancer surveillance: Personal history of                        colonic polyps  Providers:           Thomas M. Leventhal, MD, Tonya Fowler RN  Referring MD:        Richi Jaramillo MD  Medicines:           Midazolam 1 mg IV, Fentanyl 50 micrograms IV  Complications:       No imm ediate complications.  _______________________________________________________________________________  Procedure:           Pre-Anesthesia Assessment:                       - Prior to the procedure, a History and Physical was                        performed, and patient medications and allergies were                        reviewed. The patient is competent. The risks and                        benefits of the procedure and the sedation options and                        risks were discussed with the patient. All questions                        were answered and informed consent was obtained. Patient                        identification and proposed procedure were verified by                        the physician and the nurse in the pre-procedure area.                        Mental Status Examination: alert and oriented. Airway                        Examination: normal oropharyngeal airway and neck                        mobility. Respiratory Examination: clear to                         auscultation. CV  Examination: regular rate and rhythm.                        Prophylactic Antibiotics: The patient does not require                        prophylactic antibiotics. Prior Anticoagulants: The                        patient has taken no previous anticoagulant or                        antiplatelet agents. ASA Grade Assessment: III - A                        patient with severe systemic disease. After reviewing                        the risks and benefits, the patient was deemed in                        satisfactory condition to undergo the procedure. The                        anesthesia plan was to use moderate sedation / analgesia                        (conscious sedation). Immediately prior to                        administration of medications, the patient was                        re-assessed for adequacy to receive sedatives. The heart                        rate, respiratory rate, oxygen saturations, blood                        pressure, adequacy of p ulmonary ventilation, and                        response to care were monitored throughout the                        procedure. The physical status of the patient was                        re-assessed after the procedure.                       After obtaining informed consent, the colonoscope was                        passed under direct vision. Throughout the procedure,                        the patient's blood pressure, pulse, and oxygen                        saturations were monitored continuously. The Colonoscope                        was introduced through the anus and advanced to the                        terminal ileum, with identification of the appendiceal                        orifice and IC valve. The colonoscopy was performed                        without difficulty. The patient tolerated the procedure                        well. The quality of the bowel preparation was adequate                        to identify polyps.                                                                                     Findings:       The perianal and digital rectal examinations were normal.       A few small-mouthed diverticula were found in the recto-sigmoid colon        and sigmoid colon.       A 3 mm polyp was found in the transverse colon. The polyp was sessile.        The polyp was removed with a jumbo cold forceps. Resection and retrieval        were complete. Verification of patient identification for the specimen        was done by the physician, nurse and technician using the patient's        name, birth date and medical record number. Estimated blood loss was        minimal.       Three sessile polyps were found in the rectum and descending colon. The        polyps were 2 to 3 mm in size. These polyps were removed with a jumbo        cold forceps. Resection and retrieval were complete. Verification of        patient identification for the specimen was done by the physician, nurse        and technician using the patient's name, birth date and medical  record        number. Estimated blood loss was minimal.       A small amount of semi-liquid stool was found in the ascending colon and        in the cecum, without interference to visualization.       The terminal ileum appeared normal.       The retroflexed view of the distal rectum and anal verge was normal and        showed no anal or rectal abnormalities.                                                                                   Moderate Sedation:       The administration of moderate sedation was initiated at 08:59 AM.       Moderate (conscious) sedation was administered by the endoscopy nurse        and supervised by the endoscopist. The following parameters were        monitored: oxygen saturation, heart rate, blood pressure, and response        to care. Total physician intraservice time was 20 minutes.  Impression:          - Diverticulosis in the recto-sigmoid colon and in the                         sigmoid colon.                       - One 3 mm polyp in the transverse  colon, removed with a                        jumbo cold forceps. Resected and retrieved.                       - Three 2 to 3 mm polyps in the rectum and in the                        descending colon, removed with a jumbo cold forceps.                        Resected and retrieved.                       - Stool in the ascending colon and in the cecum.                       - The examined portion of the ileum was normal.  Recommendation:      - Discharge patient to home.                       - Resume previous diet.                       - Continue present medications.                       - Await pathology results.                       - Repeat colonoscopy in 5-10 years for surveillance                        based on pathology results.                       - Return to primary care physician as previously                        scheduled.                                                                                     ______________________  Thomas M Leventhal, MD  2019 9:4 3:59 AM  Number of Addenda: 0    Note Initiated On: 2019 7:56 AM  Scope In:  Scope Out:              Social History:   Reviewed and edited as appropriate  Social History     Socioeconomic History    Marital status: Single     Spouse name: Not on file    Number of children: 0    Years of education: 18    Highest education level: Not on file   Occupational History    Occupation: Contractor     Employer: SELF     Comment: Not working now   Social Needs    Financial resource strain: Not on file    Food insecurity     Worry: Not on file     Inability: Not on file    Transportation needs     Medical: Not on file     Non-medical: Not on file   Tobacco Use    Smoking status: Former Smoker     Packs/day: 0.00     Years: 5.00     Pack years: 0.00     Types: Cigarettes     Start date: 1990     Last attempt to quit: 1999     Years since quittin.5     Smokeless tobacco: Never Used   Substance and Sexual Activity    Alcohol use: No     Comment: quit in     Drug use: No    Sexual activity: Not Currently     Partners: Female   Lifestyle    Physical activity     Days per week: Not on file     Minutes per session: Not on file    Stress: Not on file   Relationships    Social connections     Talks on phone: Not on file     Gets together: Not on file     Attends [employer] service: Not on file     Active member of club or organization: Not on file     Attends meetings of clubs or organizations: Not on file     Relationship status: Not on file    Intimate partner violence     Fear of current or ex partner: Not on file     Emotionally abused: Not on file     Physically abused: Not on file     Forced sexual activity: Not on file   Other Topics Concern    Parent/sibling w/ CABG, MI or angioplasty before 65F 55M? No   Social History Narrative    Not on file            Family History:   Reviewed and edited as appropriate  No known history of gastrointestinal/liver disease or  gastrointestinal malignancies       Allergies:   Reviewed and edited as appropriate     Allergies   Allergen Reactions    Estradiol Other (See Comments)     Delirium, fever  PN: vaccine (?)    Haemophilus Influenzae Nausea and Other (See Comments)     PN: delirium  fever  PN: delirium  fever      Influenza Vaccines Other (See Comments)     PN: delirium  fever  Delirium, fever,      Thimerosal Other (See Comments)     Delirium, fever            Medications:     Medications Prior to Admission   Medication Sig Dispense Refill Last Dose    alcohol swab prep pads Use to swab area of injection/jose antonio as directed. 100 each 3     [] amoxicillin (AMOXIL) 500 MG capsule Take 1 capsule (500 mg) by mouth 3 times daily for 10 days 30 capsule 0     blood glucose (NO BRAND SPECIFIED) test strip Use to test blood sugar 1 times daily or as directed. To accompany: Blood Glucose Monitor Brands: per insurance. 100  "strip 6     blood glucose calibration (NO BRAND SPECIFIED) solution To accompany: Blood Glucose Monitor Brands: per insurance. 1 Bottle 3     blood glucose monitoring (NO BRAND SPECIFIED) meter device kit Use to test blood sugar 1 times daily or as directed. Preferred blood glucose meter OR supplies to accompany: Blood Glucose Monitor Brands: per insurance. 1 kit 0     hydrochlorothiazide (HYDRODIURIL) 25 MG tablet Take 1 tablet (25 mg) by mouth daily 90 tablet 0     HYDROcodone-acetaminophen (NORCO) 5-325 MG tablet Take 1 tablet by mouth every 6 hours as needed for moderate to severe pain 24 tablet 0     losartan (COZAAR) 100 MG tablet Take 1 tablet (100 mg) by mouth daily 90 tablet 1     metFORMIN (GLUCOPHAGE) 500 MG tablet Take 1 tablet (500 mg) by mouth 2 times daily (with meals) 60 tablet 2     metoprolol succinate ER (TOPROL-XL) 50 MG 24 hr tablet 1 1/2 po daily 45 tablet 3     Multiple Vitamins-Minerals (MULTIVITAMIN ADULT PO)        omeprazole (PRILOSEC) 20 MG DR capsule TAKE 1 CAPSULE BY MOUTH ONCE DAILY 30 TO 60 MINUTES BEFORE A MEAL 90 capsule 0     spironolactone (ALDACTONE) 25 MG tablet Take 1 tablet (25 mg) by mouth daily 30 tablet 1     thin (NO BRAND SPECIFIED) lancets Use with lanceting device. To accompany: Blood Glucose Monitor Brands: per insurance. 100 each 3              Review of Systems:     A complete review of systems was performed and is negative except as noted in the HPI           Physical Exam:   /68   Pulse 66   Temp 98.3  F (36.8  C) (Oral)   Resp 16   Ht 1.803 m (5' 11\")   Wt 120.4 kg (265 lb 8 oz)   SpO2 93%   BMI 37.03 kg/m    Wt:   Wt Readings from Last 2 Encounters:   07/09/20 120.4 kg (265 lb 8 oz)   05/19/20 123.2 kg (271 lb 8 oz)      Constitutional: cooperative, pleasant, not dyspneic  Eyes: Sclera anicteric  Ears/nose/mouth/throat: Normal oropharynx without ulcers or exudate  Neck: supple  CV: No edema  Respiratory: Unlabored breathing  Lymph: NAD  Abdomen: " Obese, soft, not distended.  Right upper quadrant tenderness on deep palpation, no peritoneal signs  Skin: warm, perfused, no jaundice  Neuro: AAO x 3, No asterixis  Psych: Normal affect  MSK: In bed         Data:   Labs and imaging below were independently reviewed and interpreted    MELD-Na score: 9 at 7/8/2020  5:19 PM  MELD score: 9 at 7/8/2020  5:19 PM  Calculated from:  Serum Creatinine: 1.09 mg/dL at 7/8/2020  5:19 PM  Serum Sodium: 140 mmol/L (Rounded to 137 mmol/L) at 7/8/2020  5:19 PM  Total Bilirubin: 1.2 mg/dL at 7/8/2020  5:19 PM  INR(ratio): 1.14 at 7/8/2020  5:19 PM  Age: 70 years     BMP  Recent Labs   Lab 07/08/20  1719      POTASSIUM 3.9   CHLORIDE 108   SOLEDAD 8.5   CO2 26   BUN 18   CR 1.09   *     CBC  Recent Labs   Lab 07/08/20  1719   WBC 6.9   RBC 4.38*   HGB 14.0   HCT 43.6      MCH 32.0   MCHC 32.1   RDW 13.1   PLT 83*     INR  Recent Labs   Lab 07/08/20  1719   INR 1.14     LFTs  Recent Labs   Lab 07/08/20  1719   ALKPHOS 73   AST 46*   ALT 61   BILITOTAL 1.2   PROTTOTAL 7.1   ALBUMIN 3.4      PANC  Recent Labs   Lab 07/08/20  1719   LIPASE 185     Imaging:  Limited Abdominal Ultrasound, 7/8/2020 6:32 PM                                                                   IMPRESSION:   1.  Cholelithiasis, without additional sonographic findings to suggest  acute cholecystitis.  2.   Cirrhotic configuration of liver. No focal liver lesion.    CT ABDOMEN PELVIS W CONTRAST, 7/8/2020 9:13 PM  IMPRESSION:   1. There is eccentric thrombus within the main portal vein, extending  into the superior mesenteric vein, which is new from the previous CT  11/27/2017.  2. There is mild fat stranding in the vicinity of the gallbladder and  adjacent to the colon at the hepatic flexure. The same-day ultrasound  is not suggestive of acute cholecystitis. Findings could represent  diverticulitis, although correlation with patient's symptoms is  recommended.  3. Cirrhotic configuration of liver and  evidence of portal  hypertension including splenomegaly and esophageal varices.

## 2020-07-09 NOTE — PROGRESS NOTES
[facility], [facility] Progress Note - Hospitalist Service, Gold 6       Date of Admission:  7/8/2020  Assessment & Plan   Gucci Logan is a 70 year old male admitted on 7/8/2020. He has a prior history of DMII, obesity, SHONDA, hypertension, GERD, cirrhosis who presented to ED with abdominal pain and loose stools.         Abdominal pain  Presented with abdominal pain and loose stools. Notes symptoms have been present since Feb 2020. In the ED CT abdomen revealed portal vein thrombus, multiple stones in the gallbladder, cirrhotic liver and concern for diverticulitis. US RUQ without evidence of acute cholecystitis. CMP with AST 46 otherwise unremarkable. UA with glucose otherwise unremarkable. Lactate elevated to 2.7 on presentation, improved to 1.4 with 1L IVF. WBC wnl. Afebrile. Hemodynamically stable. DDx cholelithiasis with passing of stones, diverticulitis, gastroparesis, portal vein thrombus.    - Hepatology consulted   - Treatment of diverticulitis as below   - Surgery consult, did not feel urgent cholecystectomy indicated, rather elective at later date.   - Continue IVF    - Pain control with tylenol PRN, tramadol PRN and IV dilaudid for breakthrough pain     Portal vein thrombus   CT abdomen with new finding of portal venous thrombosis with extension into the superior mesenteric vein. Not likely cause of abdominal pain.    - Gastroenterology consulted   - Given cirrhosis and inflammation in hepatic flexure as precipitating factors, GI does not feel hypercoagulable workup needed.   - Anticoagulation recommended, will assess for esophageal varices prior to starting    - Plan for EGD, NPO at [facility]     Cirrhosis   Compensated. Likely etiology HCV (diagnosed in 2004, s/p treatment) with possible non alcoholic fatty liver disease. MELD 9. No evidnece of hepatic encephaopathy, last EGD 9/2011 without esophageal varices, moderate portal hypertensive gastropathy. CT without  evidence of HCC.   - GI consulted   - Monitor LFT, TB    - Na restriction at 2 gm per day   - Nutrition consult     Diverticulitis   CT with inflammation at hepatic flexion concerning for diverticulitis. No leukocytosis or fevers. Last colonoscopy 4/2019 with removal of tubular adenoma and hyperplastic polyps with finding of diverticulitis.   - GI consulted, recommended course of antibiotics   - Start Cipro and flagyl X 7 day course      Diabetes mellitus II  PTA on metformin, poor compliance. Hgb A1c 7.5%. BG elevated on admission to 290. Started on Lantus 5 units on admission with sliding scale.   -Hold metformin while inpatient   - Continue Lantus 5 units   - Continue insulin sliding scale     Hypertension  Blood pressures elevated on admission. Follows closely with PCP for management. PTA on hydrochlorothiazide 50 mg, losartan 100 mg, spironolactone 25 mg and metoprolol 75 mg. BP controlled today.   - Continue PTA hydrochlorothiazide (ordered as 25 mg daily on admission, increased to 50 mg daily and additional 25 mg for today  - Continue PTA losartan, spironolactone and metoprolol   - Pharmacy consult for med rec      GERD  - Continue PTA omeprazole 20 mg daily        Diet: NPO for Medical/Clinical Reasons Except for: Ice Chips, Meds  NPO per Anesthesia Guidelines for Procedure/Surgery Except for: Meds    DVT Prophylaxis: Enoxaparin (Lovenox) subcutaneous  Rankin Catheter: not present  Code Status: Full Code           Disposition Plan   Expected discharge: 2 - 3 days, recommended to prior living arrangement once adequate pain management/ tolerating PO medications, antibiotic plan established and workup complete.  Entered: VU Contreras 07/09/2020, 2:19 PM       The patient's care was discussed with the Attending Physician, Dr. Senior, Bedside Nurse, Patient, Patient's Family and Gastroenterology Consultant.    VU Contreras  Hospitalist Service, [address],  "[place]  Pager: [phone]  Please see sticky note for cross cover information  ______________________________________________________________________    Interval History   Patient reports feeling okay today. Continues to have intermittent pain in his abdomen that is present in left upper quadrant and right upper quadrant. Pain is sometimes achy and other times shooting pain. Notes tylenol is not effective against pain in abdomen. Slight nausea this AM, improved with antiemetics. Denies vomiting. Last BM yesterday. Notes he often fluctuates between hard and loose stool. Denies hematochezia. Denies dysuria or hematuria. Reports he often has night sweats. Has chronic bilateral lower extremity neuropathy. Also note \"rash\" on his buttocks that worsens when he is lying in bed more often and sweats, follows with dermatology.    Denies chest pain, SOB, cough, weakness.     Data reviewed today: I reviewed all medications, new labs and imaging results over the last 24 hours. I personally reviewed no images or EKG's today.    Physical Exam   Vital Signs: Temp: 97.9  F (36.6  C) Temp src: Oral BP: 122/75 Pulse: 66 Heart Rate: 61 Resp: 16 SpO2: 96 % O2 Device: None (Room air)    Weight: 265 lbs 8 oz  GENERAL: Alert and oriented x 3. NAD.   HEENT: Anicteric sclera. PERRL. Mucous membranes moist and without lesions.   CV: RRR. S1, S2. 3/6 systolic murmurs appreciated.   RESPIRATORY: Effort normal on RA. Lungs CTAB with no wheezing, rales, rhonchi.   GI: Abdomen soft, obese and non distended, bowel sounds present. TTP of deep palpation of left and right upper quadrants, no rebound tenderness or guarding.    MUSCULOSKELETAL: No joint swelling or tenderness. Moves all extremities.   NEUROLOGICAL: Decreased sensation of bilateral lower extremities. Otherwise no focal deficits appreciated.   EXTREMITIES: No peripheral edema. Intact bilateral pedal pulses.   SKIN: No jaundice. No rashes.       Data   Recent Labs   Lab 07/08/20  7929 "   WBC 6.9   HGB 14.0      PLT 83*   INR 1.14      POTASSIUM 3.9   CHLORIDE 108   CO2 26   BUN 18   CR 1.09   ANIONGAP 6   SOLEDAD 8.5   *   ALBUMIN 3.4   PROTTOTAL 7.1   BILITOTAL 1.2   ALKPHOS 73   ALT 61   AST 46*   LIPASE 185   TROPI <0.015     Recent Results (from the past 24 hour(s))   Abdomen US, limited (RUQ only)    Narrative    EXAMINATION: Limited Abdominal Ultrasound, 7/8/2020 6:32 PM     COMPARISON: Ultrasound 11/27/2017    HISTORY: Evaluate for gallbladder pathology, liver pathology, ascites    FINDINGS:   Fluid: No evidence of ascites or pleural effusions.    Liver: The liver parenchyma is coarsened with peripheral nodularity,  measuring 18.2 cm in craniocaudal dimension. There is no focal mass.     Gallbladder: Multiple mobile shadowing gallstones. No gallbladder wall  thickening, pericholecystic fluid or sonographic Alcantara's sign.    Bile Ducts: Both the intra- and extrahepatic biliary system are of  normal caliber.  The common bile duct measures 6 mm in diameter.    Pancreas: Pancreas is largely obscured by overlying bowel gas.    Kidney: The right kidney measures 12.0 cm long. There is no  hydronephrosis or hydroureter, no shadowing renal calculi, cystic  lesion or mass.       Impression    IMPRESSION:   1.  Cholelithiasis, without additional sonographic findings to suggest  acute cholecystitis.  2.   Cirrhotic configuration of liver. No focal liver lesion.    I have personally reviewed the examination and initial interpretation  and I agree with the findings.    MATTEO LEMOS MD   CT Abdomen Pelvis w Contrast   Result Value    Radiologist flags New portal vein/SMV thrombus (Urgent)    Narrative    EXAMINATION: CT ABDOMEN PELVIS W CONTRAST, 7/8/2020 9:13 PM    TECHNIQUE:  Helical CT images from the lung bases through the  symphysis pubis were obtained with IV contrast. Contrast dose:  iopamidol (ISOVUE-370) solution 135 mL    COMPARISON: CT abdomen pelvis  11/27/2017    HISTORY: Right upper quadrant pain, history of cirrhosis, evaluate for  pathology    FINDINGS:    Abdomen and pelvis: Cirrhotic configuration of liver. No focal liver  lesions. Multiple layering stones in the gallbladder. No intrahepatic  or extrahepatic biliary dilatation. The pancreas is unremarkable.  Spleen is enlarged and measures 14.6 cm in craniocaudal dimension.  Adrenal glands are unremarkable. Normal symmetric enhancement of both  kidneys. 4.6 cm exophytic cyst arising from the lateral mid left  kidney, previously measured 4.0 cm on 11/27/2017. Additional 2.0 cm  exophytic cyst the lower pole the left kidney, and 1.3 cm cyst in the  anterior mid right kidney. Several other subcentimeter hypodensities  in both kidneys are too small to characterize, favored to represent  additional cysts. No hydronephrosis or hydroureter. No renal or  ureteral stones. The urinary bladder, prostate and seminal vesicles  are unremarkable. Small bilateral fat-containing inguinal hernias,  left greater than right. No abnormally dilated loops of small or large  bowel. Colonic diverticulosis, without convincing evidence of acute  diverticulitis. Unremarkable appendix. Tiny fat-containing umbilical  hernia. Mild fat stranding is seen adjacent to the gallbladder and  along the colon at the hepatic flexure (series 5, image 147)    There is eccentric thrombus within the main portal vein extending into  the superior mesenteric vein (series 5, images 136-184), which is new  from 11/27/2017. Esophageal varices. Abdominal aorta is normal in  caliber. Several prominent portacaval lymph nodes measuring up to 10  mm in short axis, similar to prior. No other enlarged lymph nodes in  the abdomen or pelvis.    Lung bases: The heart is not enlarged. No pericardial effusion. The  lung bases are clear.    Bones and soft tissues: Posterior fusion instrumentation of the  posterior elements spanning T11/L2. Multilevel degenerative  changes of  the spine. No acute or suspicious osseous lesions.      Impression    IMPRESSION:   1. There is eccentric thrombus within the main portal vein, extending  into the superior mesenteric vein, which is new from the previous CT  11/27/2017.  2. There is mild fat stranding in the vicinity of the gallbladder and  adjacent to the colon at the hepatic flexure. The same-day ultrasound  is not suggestive of acute cholecystitis. Findings could represent  diverticulitis, although correlation with patient's symptoms is  recommended.  3. Cirrhotic configuration of liver and evidence of portal  hypertension including splenomegaly and esophageal varices.    [Urgent Result: New portal vein/SMV thrombus]    Finding was identified on 7/8/2020 8:57 PM.     [facility] Dr[facility] Rocha at 7/8/2020 9:14 PM and verbalized  understanding of the urgent finding.     I have personally reviewed the examination and initial interpretation  and I agree with the findings.    MATTEO LEMOS MD     Medications     lactated ringers 75 mL/hr at 07/09/20 0805       ciprofloxacin  500 mg Oral Q12H VINCENT     hydrochlorothiazide  25 mg Oral Once     [START ON 7/10/2020] hydrochlorothiazide  50 mg Oral Daily     insulin aspart   Subcutaneous QAM AC     insulin aspart   Subcutaneous Daily with lunch     insulin aspart   Subcutaneous Daily with supper     insulin aspart  1-3 Units Subcutaneous TID AC     insulin aspart  1-3 Units Subcutaneous At Bedtime     insulin glargine  5 Units Subcutaneous QAM AC     losartan  100 mg Oral Daily     metoprolol succinate ER  75 mg Oral Daily     metroNIDAZOLE  500 mg Oral Q8H     omeprazole  20 mg Oral QAM AC     spironolactone  25 mg Oral Daily

## 2020-07-09 NOTE — PROGRESS NOTES
"./68   Pulse 66   Temp 98.3  F (36.8  C) (Oral)   Resp 16   Ht 1.803 m (5' 11\")   Wt 120.4 kg (265 lb 8 oz)   SpO2 93%   BMI 37.03 kg/m         Observation goals  PRIOR TO DISCHARGE:     -Diagnostic tests and consults completed and resulted- Pending   -Vital signs normal or at patient baseline- Yes     Nurse to notify provider when observation goals have been met and patient is ready for discharge.         "

## 2020-07-09 NOTE — PROGRESS NOTES
Surgery Progress Note  07/09/2020       Subjective:  - TACO overnight. Persistent abdominal pain, with nausea. Urinating. Passing gas. Bowel movements.     Objective:  Temp:  [97.8  F (36.6  C)-98.3  F (36.8  C)] 98.3  F (36.8  C)  Pulse:  [58-66] 66  Heart Rate:  [60-74] 66  Resp:  [16] 16  BP: (124-156)/(56-81) 132/68  SpO2:  [93 %-95 %] 93 %    No intake/output data recorded.      Gen: Awake, alert, NAD  Resp: NLB on RA  Abd: Soft, distended, diffuse tenderness to palpation  Ext: WWP, no edema     Labs:  Recent Labs   Lab 07/08/20  1719   WBC 6.9   HGB 14.0   PLT 83*       Recent Labs   Lab 07/08/20  1719      POTASSIUM 3.9   CHLORIDE 108   CO2 26   BUN 18   CR 1.09   *   SOLEDAD 8.5     Imaging:  CT AP:   1. There is eccentric thrombus within the main portal vein, extending into the superior mesenteric vein, which is new from the previous CT 11/27/2017.  2. There is mild fat stranding in the vicinity of the gallbladder and adjacent to the colon at the hepatic flexure. The same-day ultrasound is not suggestive of acute cholecystitis. Findings could represent diverticulitis, although correlation with patient's symptoms is recommended.  3. Cirrhotic configuration of liver and evidence of portal hypertension including splenomegaly and esophageal varices.     Assessment/Plan:   70 year old male PMH significant for DM2, HTN, GERD, HCV, cirrhosis of liver, and obesity who presents to the ED today with abdominal pain and diarrhea and found to have CT findings of diffuse diverticulum, cholelithiasis without signs of cholecystitis, and portal venous thrombosis.     In evaluation of the patient, his abdominal pain has persisted over the past year, and both his clinical exam and imaging does not appear to correlate with a picture of onset of presumed acute appendicitis, but rather due to his recurrent ascites and portal venous thrombosis.    We recommend further optimization for surgical intervention with a  potential cholecystectomy at a later date.    Surgery will sign off. Please call with any questions.      Seen, examined, and discussed with chief resident, Dr. Urbano, who will discuss with staff.  - - - - - - - - - - - - - - - - - -  Isaias Teixeira  General Surgery PGY-1  Pager [phone]      Chief Resident Addendum:     Seen and discussed with Dr. Nation.     Medically complex gentleman with a 70 year old male with cirrhosis and months of abdominal pain. Noted to have cholelithiasis, but no signs of cholecystitis (previously evaluated via HIDA scan as well). CT scan showing possible diverticulitis, ultrasound and labs without signs of cholecystits. On exam has diffuse mild abdominal pain, no Muprhy's sign. No acute indication for surgical intervention. Would not offer cholecystectomy given liver disease for cholelithiasis. Reasonable to treat with a course of antibiotics for possible diverticulitis for 7 days. Recommend GI consultation given cirrhosis and portal venous thrombosis.   Surgery will sign off. Please call with questions or concerns.     Marva Urbano MD  General Surgery Resident  Pager: [phone]

## 2020-07-09 NOTE — UTILIZATION REVIEW
Admission Status; Secondary Review Determination    Under the authority of the Utilization Management Committee, the utilization review process indicated a secondary review on the above patient. The review outcome is based on review of the medical records, discussions with staff, and applying clinical experience noted on the date of the review.    (x) Inpatient Status Appropriate - This patient's medical care is consistent with medical management for inpatient care and reasonable inpatient medical practice.    RATIONALE FOR DETERMINATION: 70-year-old male with history of compensated cirrhosis of the liver presented to the hospital with acute on chronic abdominal pain, nausea and loose stools.  Patient does have known gallstones with negative HIDA scan in the past.  Patient found to have portal vein thrombosis that is extending into the superior mesenteric vein, possible acute diverticulitis in the area of the hepatic flexure.  Patient will require pain control, careful anticoagulation, evaluation of his varices prior to anticoagulation as well as antibiotics and IV fluids appropriate for inpatient care.    At the time of admission with the information available to the attending physician more than 2 nights Hospital complex care was anticipated, based on patient risk of adverse outcome if treated as outpatient and complex care required. Inpatient admission is appropriate based on the Medicare guidelines.    This document was produced using voice recognition software    The information on this document is developed by the utilization review team in order for the business office to ensure compliance. This only denotes the appropriateness of proper admission status and does not reflect the quality of care rendered.    The definitions of Inpatient Status and Observation Status used in making the determination above are those provided in the CMS Coverage Manual, Chapter 1 and Chapter 6, section  70.4.    Sincerely,    Clarence Shrestha MD  Utilization Review  Physician Advisor  [facility].

## 2020-07-09 NOTE — PLAN OF CARE
Patient arrive from ED at 05. Alert and oriented x 4. Complained of pain in RUQ and slight nausea. Admission profile done. Up independently to the BR and voided.

## 2020-07-09 NOTE — PLAN OF CARE
"/63 (BP Location: Right arm)   Pulse 66   Temp 98.2  F (36.8  C) (Oral)   Resp 16   Ht 1.803 m (5' 11\")   Wt 120.4 kg (265 lb 8 oz)   SpO2 95%   BMI 37.03 kg/m      Shift: 0581-6388  Reason for Admission: Portal vein thrombosis, diverticulitis, esophageal varices  VS: VSS on RA  Neuros: A/Ox4, able to make needs known  GI/: No BMs reported this shift. Voiding adequately   Diet: CLD. BG ACHS.   Drains/Lines: PIV infusing LR @ 75 mL/hr  Activity: Pt up ad christophe  Pain/Nausea: C/o of abdominal, knee, and ankle pain- PRN Tylenol and Tramadol given. C/o of nausea- ODT Zofran given  Skin: Rash on coccyx region- GEOVANY  Plan of care: No plans for surgery or EGD, potential discharge tomorrow if pt is stable overnight. Will continue to monitor and follow POC  "

## 2020-07-09 NOTE — PROGRESS NOTES
"CLINICAL NUTRITION SERVICES - ASSESSMENT NOTE     Nutrition Prescription    RECOMMENDATIONS FOR MDs/PROVIDERS TO ORDER:  Minimize periods of NPO status as able.     Recommend low sodium (2 gm) diet upon diet advancement.     Recommendations already ordered by Registered Dietitian (RD):  Boost Glucose Control @ breakfast and HS snack time    Future/Additional Recommendations:  Encourage small/frequent meals.     Encourage a variety of protein sources.      REASON FOR ASSESSMENT  Gucci Logan is a/an 70 year old male assessed by the dietitian for Provider Order - supplements    NUTRITION HISTORY  Per H+P, patient with abdominal pain (not particularly associated with eating) and loose stools since February.  Pt with hx of poorly controlled diabetes, typically on metformin but not always compliant.     CURRENT NUTRITION ORDERS  Diet: NPO  Intake/Tolerance: No diet order since admission    LABS  Hgb A1C 7.5%    - elevated (4/20/20)    MEDICATIONS  Flagyl TID  Novolog 1 unit per 15 grams + Low sliding scale insulin + Lantus 5 units     ANTHROPOMETRICS  Height: 180.3 cm (5' 11\")  Most Recent Weight: 120.4 kg (265 lb 8 oz)    IBW: 78.2 kg  BMI: Obesity Grade II BMI 35-39.9  Weight History:  Appears to have had some weight loss (13#/4.6%) within the last 3 months.  Wt Readings from Last 15 Encounters:   07/09/20 120.4 kg (265 lb 8 oz)   05/19/20 123.2 kg (271 lb 8 oz)   04/20/20 126.2 kg (278 lb 2 oz)   03/11/20 124.7 kg (275 lb)   03/06/20 125.8 kg (277 lb 6.4 oz)   02/20/20 124 kg (273 lb 6.4 oz)   01/07/20 124.3 kg (274 lb)   12/23/19 124.4 kg (274 lb 3 oz)   11/05/19 124.3 kg (274 lb)   10/25/19 124.8 kg (275 lb 2 oz)   09/13/19 127 kg (280 lb)   05/31/19 124.5 kg (274 lb 6 oz)   05/03/19 125.7 kg (277 lb 2 oz)   04/24/19 124.7 kg (275 lb)   04/19/19 126.6 kg (279 lb)   Dosing Weight: 89 kg (adj wt based on IBW of 78.2 kg and actual wt of 120.4 kg)    ASSESSED NUTRITION NEEDS  Estimated Energy Needs: " 9306-9658 kcals/day (20 - 25 kcals/kg)  Justification: Obese  Estimated Protein Needs: 107-134 grams protein/day (1.2 - 1.5 grams of pro/kg)  Justification: Cirrhosis and Obesity guidelines  Estimated Fluid Needs: per MD    MALNUTRITION  % Intake: Unable to assess  % Weight Loss: Up to 7.5% in 3 months (non-severe)  Subcutaneous Fat Loss: Unable to assess  Muscle Loss: Unable to assess  Fluid Accumulation/Edema: Unable to assess  Malnutrition Diagnosis: Unable to determine due to incomplete information at this time.     NUTRITION DIAGNOSIS  Predicted inadequate nutrient intake (protein) related to high assessed needs with cirrhosis    INTERVENTIONS  Implementation  Nutrition Education: Unable to complete due to unable to reach patient.  Attempted calling patient 3x without success.  Will re-attempt as able.    Medical food supplement therapy     Goals  Patient to consume % of nutritionally adequate meal trays TID, or the equivalent with supplements/snacks.     Monitoring/Evaluation  Progress toward goals will be monitored and evaluated per protocol.    Shelbi Ray MS, RD, LD  Pager [phone]

## 2020-07-10 VITALS
WEIGHT: 265.43 LBS | TEMPERATURE: 98.7 F | HEIGHT: 71 IN | DIASTOLIC BLOOD PRESSURE: 69 MMHG | HEART RATE: 79 BPM | RESPIRATION RATE: 16 BRPM | OXYGEN SATURATION: 98 % | SYSTOLIC BLOOD PRESSURE: 149 MMHG | BODY MASS INDEX: 37.16 KG/M2

## 2020-07-10 LAB
ALBUMIN SERPL-MCNC: 3.2 G/DL (ref 3.4–5)
ALP SERPL-CCNC: 67 U/L (ref 40–150)
ALT SERPL W P-5'-P-CCNC: 56 U/L (ref 0–70)
ANION GAP SERPL CALCULATED.3IONS-SCNC: 3 MMOL/L (ref 3–14)
AST SERPL W P-5'-P-CCNC: 42 U/L (ref 0–45)
BILIRUB SERPL-MCNC: 1.5 MG/DL (ref 0.2–1.3)
BUN SERPL-MCNC: 14 MG/DL (ref 7–30)
CALCIUM SERPL-MCNC: 8.4 MG/DL (ref 8.5–10.1)
CHLORIDE SERPL-SCNC: 105 MMOL/L (ref 94–109)
CO2 SERPL-SCNC: 29 MMOL/L (ref 20–32)
CREAT SERPL-MCNC: 1.02 MG/DL (ref 0.66–1.25)
ERYTHROCYTE [DISTWIDTH] IN BLOOD BY AUTOMATED COUNT: 13.3 % (ref 10–15)
GFR SERPL CREATININE-BSD FRML MDRD: 75 ML/MIN/{1.73_M2}
GLUCOSE BLDC GLUCOMTR-MCNC: 110 MG/DL (ref 70–99)
GLUCOSE BLDC GLUCOMTR-MCNC: 144 MG/DL (ref 70–99)
GLUCOSE SERPL-MCNC: 179 MG/DL (ref 70–99)
HCT VFR BLD AUTO: 42.5 % (ref 40–53)
HGB BLD-MCNC: 13.5 G/DL (ref 13.3–17.7)
MCH RBC QN AUTO: 32.4 PG (ref 26.5–33)
MCHC RBC AUTO-ENTMCNC: 31.8 G/DL (ref 31.5–36.5)
MCV RBC AUTO: 102 FL (ref 78–100)
PLATELET # BLD AUTO: 67 10E9/L (ref 150–450)
POTASSIUM SERPL-SCNC: 4 MMOL/L (ref 3.4–5.3)
PROT SERPL-MCNC: 7.1 G/DL (ref 6.8–8.8)
RBC # BLD AUTO: 4.17 10E12/L (ref 4.4–5.9)
SODIUM SERPL-SCNC: 137 MMOL/L (ref 133–144)
WBC # BLD AUTO: 3.5 10E9/L (ref 4–11)

## 2020-07-10 PROCEDURE — 85027 COMPLETE CBC AUTOMATED: CPT | Performed by: PHYSICIAN ASSISTANT

## 2020-07-10 PROCEDURE — 96361 HYDRATE IV INFUSION ADD-ON: CPT

## 2020-07-10 PROCEDURE — 25000132 ZZH RX MED GY IP 250 OP 250 PS 637: Performed by: PHYSICIAN ASSISTANT

## 2020-07-10 PROCEDURE — 00000146 ZZHCL STATISTIC GLUCOSE BY METER IP

## 2020-07-10 PROCEDURE — 25000132 ZZH RX MED GY IP 250 OP 250 PS 637: Performed by: HOSPITALIST

## 2020-07-10 PROCEDURE — 36415 COLL VENOUS BLD VENIPUNCTURE: CPT | Performed by: PHYSICIAN ASSISTANT

## 2020-07-10 PROCEDURE — 99207 ZZC APP CREDIT; MD BILLING SHARED VISIT: CPT | Performed by: PHYSICIAN ASSISTANT

## 2020-07-10 PROCEDURE — 99239 HOSP IP/OBS DSCHRG MGMT >30: CPT | Performed by: INTERNAL MEDICINE

## 2020-07-10 PROCEDURE — 96372 THER/PROPH/DIAG INJ SC/IM: CPT

## 2020-07-10 PROCEDURE — 25000128 H RX IP 250 OP 636: Performed by: HOSPITALIST

## 2020-07-10 PROCEDURE — 80053 COMPREHEN METABOLIC PANEL: CPT | Performed by: PHYSICIAN ASSISTANT

## 2020-07-10 RX ORDER — CIPROFLOXACIN 500 MG/1
500 TABLET, FILM COATED ORAL EVERY 12 HOURS
Qty: 12 TABLET | Refills: 0 | Status: SHIPPED | OUTPATIENT
Start: 2020-07-10 | End: 2020-07-17

## 2020-07-10 RX ORDER — HYDROCHLOROTHIAZIDE 25 MG/1
50 TABLET ORAL DAILY
COMMUNITY
Start: 2020-07-10 | End: 2020-09-11

## 2020-07-10 RX ORDER — TRAMADOL HYDROCHLORIDE 50 MG/1
50 TABLET ORAL EVERY 6 HOURS PRN
Qty: 15 TABLET | Refills: 0 | Status: SHIPPED | OUTPATIENT
Start: 2020-07-10 | End: 2020-09-11

## 2020-07-10 RX ORDER — ONDANSETRON 4 MG/1
4 TABLET, ORALLY DISINTEGRATING ORAL EVERY 6 HOURS PRN
Qty: 10 TABLET | Refills: 0 | Status: SHIPPED | OUTPATIENT
Start: 2020-07-10 | End: 2021-08-26

## 2020-07-10 RX ORDER — METRONIDAZOLE 500 MG/1
500 TABLET ORAL EVERY 8 HOURS
Qty: 17 TABLET | Refills: 0 | Status: SHIPPED | OUTPATIENT
Start: 2020-07-10 | End: 2020-07-17

## 2020-07-10 RX ADMIN — HYDROCHLOROTHIAZIDE 50 MG: 50 TABLET ORAL at 08:18

## 2020-07-10 RX ADMIN — TRAMADOL HYDROCHLORIDE 50 MG: 50 TABLET, FILM COATED ORAL at 10:41

## 2020-07-10 RX ADMIN — TRAMADOL HYDROCHLORIDE 50 MG: 50 TABLET, FILM COATED ORAL at 03:38

## 2020-07-10 RX ADMIN — CIPROFLOXACIN HYDROCHLORIDE 500 MG: 500 TABLET, FILM COATED ORAL at 08:17

## 2020-07-10 RX ADMIN — INSULIN GLARGINE 5 UNITS: 100 INJECTION, SOLUTION SUBCUTANEOUS at 08:15

## 2020-07-10 RX ADMIN — OMEPRAZOLE 20 MG: 20 CAPSULE, DELAYED RELEASE ORAL at 08:16

## 2020-07-10 RX ADMIN — METOPROLOL SUCCINATE 75 MG: 25 TABLET, EXTENDED RELEASE ORAL at 11:02

## 2020-07-10 RX ADMIN — METRONIDAZOLE 500 MG: 500 TABLET ORAL at 06:26

## 2020-07-10 RX ADMIN — METRONIDAZOLE 500 MG: 500 TABLET ORAL at 14:42

## 2020-07-10 RX ADMIN — LOSARTAN POTASSIUM 100 MG: 50 TABLET, FILM COATED ORAL at 08:16

## 2020-07-10 RX ADMIN — ONDANSETRON 4 MG: 4 TABLET, ORALLY DISINTEGRATING ORAL at 08:29

## 2020-07-10 RX ADMIN — SPIRONOLACTONE 25 MG: 25 TABLET ORAL at 08:16

## 2020-07-10 ASSESSMENT — ACTIVITIES OF DAILY LIVING (ADL)
ADLS_ACUITY_SCORE: 11
ADLS_ACUITY_SCORE: 9
ADLS_ACUITY_SCORE: 11

## 2020-07-10 ASSESSMENT — MIFFLIN-ST. JEOR: SCORE: 1991.13

## 2020-07-10 ASSESSMENT — PAIN DESCRIPTION - DESCRIPTORS: DESCRIPTORS: ACHING

## 2020-07-10 NOTE — PLAN OF CARE
Status: Patient admitted for portal vein thrombosis, diverticulitis, and EV.   VS: VSS on RA.  Neuros: A&Ox4.   GI/: Tolerating clear liquid diet. ADAT. Denies nausea. BM x1. Voiding spontaneously.   IV: L PIV infusing w/ LR @ 75 ml/hr.   Activity: Up independently.   Pain: Abdominal pain controlled w/ PRN Tramadol.   Respiratory/Trach: No issues.   Skin: Intact.  Plan of Care: Continue to monitor and follow POC.

## 2020-07-10 NOTE — PLAN OF CARE
DISCHARGE:  Patient with orders to discharge to his home    Education Provided:   Handouts discharge instruction  LDAs: PIV removed    Discharge instructions, medications & follow ups reviewed with pt. Copy of discharge summary given to pt. PIV removed.   Patient in stable condition. AVSS. pt had no further questions regarding discharge instructions and medications. Patient transferred out by transport to discharge pharmacy then to main entrance to meet his ride

## 2020-07-10 NOTE — PROGRESS NOTES
Social Work:   per nursing, patient is being discharged today and has not transportation home.  It does not appear that patient's health plan has transportation benefits.  Taxi ride order on-line through transportation plus.  Scheduled for 3PM from [facility].  confrm #[phone].  Updated charge nurse on 7a.

## 2020-07-10 NOTE — DISCHARGE SUMMARY
[facility], [place]  Hospitalist Discharge Summary      Date of Admission:  7/8/2020  Date of Discharge:  7/10/2020  Discharging Provider: VU Contreras/Dr. Grossman  Discharge Team: Hospitalist Service, [place]     Discharge Diagnoses   Diverticulitis   Portal vein thrombus   Cirrhosis   Diabetes II  Hypertension   GERD     Follow-ups Needed After Discharge   Follow-up Appointments     Adult [facility] Follow-up and recommended labs and tests      Follow up with primary care provider within 7 days for hospital follow-   up.  The following labs/tests are recommended: CMP, CBC.    Follow up with Dr. Urbano , of surgery, within 4 weeks  for hospital   follow- up. No follow up labs or test are needed.  Follow up with Dr. Briggs, of hepatology, within 2 weeks  for hospital   follow- up regarding portal vein thrombus. The following labs/tests are   recommended: CMP.  Appointments on [place] and/or [facility] (with [facility] or [facility]   provider or service). Call [phone] if you haven't heard regarding   these appointments within 7 days of discharge.             Discharge Disposition   Discharged to home  Condition at discharge: Stable    Hospital Course   Gucci Logan is a 70 year old male admitted on 7/8/2020. He has a prior history of DMII, obesity, SHONDA, hypertension, GERD, cirrhosis who presented to ED with abdominal pain and loose stools.         Abdominal pain  Concern for biliary cholic  Diverticulitis   Presented with abdominal pain and loose stools. Notes symptoms have been present since Feb 2020. In the ED CT abdomen revealed portal vein thrombus, multiple stones in the gallbladder, cirrhotic liver and concern for diverticulitis. US RUQ without evidence of acute cholecystitis. CMP without significantly elevated LFTs or Tbili. UA with glucose otherwise unremarkable. Lactate elevated to 2.7 on presentation, improved to 1.4 with 1L IVF. WBC wnl. Afebrile. Hemodynamically  stable. He was evaluated by surgery and gastroenterology. Started on ciprofloxacin and flagyl for diverticulitis. Given non obstructive pattern and acute infection with diverticulitis, surgery did not feel urgent cholecystectomy was indicated, recommended follow up as outpatient for cholecystectomy as outpatient.    - Abx for diverticulitis as below    - Follow up with surgery as outpatient for non urgent cholecystectomy Surgery consult   - Pain control with tylenol PRN and tramadol PRN   - Follow up with PCP within one week for repeat CMP     Portal vein thrombus   CT abdomen with new finding of portal venous thrombosis with extension into the superior mesenteric vein. Not likely cause of abdominal pain. Gastroenterology consulted and recommended following up as outpatient for consideration of EGD to assess for esophageal varices and consideration of anticoagulation. Given cirrhosis and inflammation in the hepatic flexure as likely precipitating factors, hypercoagulable workup was not pursued.   - Follow up with gastroenterology for ongoing management      Cirrhosis   Compensated. Likely etiology HCV (diagnosed in 2004, s/p treatment) with possible non alcoholic fatty liver disease. MELD 9. No evidnece of hepatic encephaopathy, last EGD 9/2011 without esophageal varices, moderate portal hypertensive gastropathy. CT without evidence of HCC. Gastroenterology and nutrition consulted.   - Follow up with gastroenterology for ongoing management        Diverticulitis   CT with inflammation at hepatic flexion concerning for diverticulitis. No leukocytosis or fevers. Last colonoscopy 4/2019 with removal of tubular adenoma and hyperplastic polyps with finding of diverticulitis.   - Continue Cipro and flagyl for 7 day course      Diabetes mellitus II  PTA on metformin, poor compliance. Hgb A1c 7.5%. BG elevated on admission to 290. Started on Lantus 5 units on admission with sliding scale. Given improvement in HgbA1c for  prior, insulin was held on discharge.   - Resume PTA  metformin   - Follow up with PCP for ongoing monitoring and management      Hypertension  Blood pressures elevated on admission. Follows closely with PCP for management. PTA on hydrochlorothiazide 50 mg, losartan 100 mg, spironolactone 25 mg and metoprolol 75 mg. BP controlled during admission.   - Continue PTA hydrochlorothiazide, losartan, spironolactone and metoprolol      GERD  - Continue PTA omeprazole 20 mg daily     Consultations This Hospital Stay   GI LUMINAL ADULT IP CONSULT  GI LUMINAL ADULT IP CONSULT  GI HEPATOLOGY ADULT IP CONSULT  MEDICATION HISTORY IP PHARMACY CONSULT  NUTRITION SERVICES ADULT IP CONSULT  SOCIAL WORK IP CONSULT    Code Status   Full Code    Time Spent on this Encounter   ILashonda PA, personally saw the patient today and spent greater than 30 minutes discharging this patient.       VU Contreras  [facility] [place]  ______________________________________________________________________    Physical Exam   Vital Signs: Temp: 98.7  F (37.1  C) Temp src: Oral BP: (!) 149/69 Pulse: 79 Heart Rate: 57 Resp: 16 SpO2: 98 % O2 Device: None (Room air)    Weight: 265 lbs 6.94 oz  GENERAL: Alert and oriented x 3. NAD.   HEENT: Anicteric sclera. PERRL. Mucous membranes moist and without lesions.   CV: RRR. S1, S2. 3/6 systolic murmurs appreciated.   RESPIRATORY: Effort normal on RA. Lungs CTAB with no wheezing, rales, rhonchi.   GI: Abdomen soft, obese and non distended, bowel sounds present. TTP of deep palpation of right upper quadrants, no rebound tenderness or guarding.    MUSCULOSKELETAL: No joint swelling or tenderness. Moves all extremities.   NEUROLOGICAL: Decreased sensation of bilateral lower extremities. Otherwise no focal deficits appreciated.   EXTREMITIES: No peripheral edema. Intact bilateral pedal pulses.   SKIN: No jaundice. No rashes.        Primary Care Physician   Physician No  Ref-Primary    Discharge Orders      Activity    Your activity upon discharge: activity as tolerated and no driving while on Tramadol     Monitor and record    blood glucose 4 times a day, before meals and at bedtime     Adult [facility] Follow-up and recommended labs and tests    Follow up with primary care provider within 7 days for hospital follow- up.  The following labs/tests are recommended: CMP, CBC.    Follow up with Dr. Urbano , of surgery, within 4 weeks  for hospital follow- up. No follow up labs or test are needed.  Follow up with Dr. Briggs, of hepatology, within 2 weeks  for hospital follow- up regarding portal vein thrombus. The following labs/tests are recommended: CMP.  Appointments on [place] and/or [facility] (with [facility] or [facility] provider or service). Call [phone] if you haven't heard regarding these appointments within 7 days of discharge.     Reason for your hospital stay    Dear Gucci Logan    Your were hospitalized at [facility] with abdominal pain and treated with antibiotics for diverticulitis.  Over your hospitalization your symptoms improved and today you are ready to be discharged to home.  If you continue antibiotics therapy you should continue to improve but if you develop fever, shortness of breath, light headedness, chest pain or increased abdominal pain please seek medical attention.    We are suggesting the following medication changes:  - Ciprofloxacin and metronidazole for 5 days    - Ondansetron (Zofran) as needed for nausea    - Tramadol 50 mg as needed for severe abdominal pain     Please set up an appointment with:  -Follow up with your primary care provider for ongoing management   -Follow up with hepatology, Dr. Briggs, for management of portal venous thrombus and cirrhosis   -Follow up with general surgery, Dr. Urbano, for management of gallbadder disease     It was a pleasure meeting with you today. Thank you for allowing me  and my team the privilege of caring for you today. You are the reason we are here, and I truly hope we provided you with the excellent service you deserve. Please let us know if there is anything else we can do for you so that we can be sure you are leaving completely satisfied with your care experience.      Take care!  Lashonda Owens PA-C  Hospitalist Service     Diet    Follow this diet upon discharge: Diabetic diet, limit sodium to 2 gm per day       Significant Results and Procedures   Most Recent 3 CBC's:  Recent Labs   Lab Test 07/10/20  1038 07/08/20  1719 04/20/20  1102   WBC 3.5* 6.9 3.8*   HGB 13.5 14.0 14.0   * 100 96   PLT 67* 83* 63*     Most Recent 3 BMP's:  Recent Labs   Lab Test 07/10/20  1038 07/08/20  1719 05/19/20  1042 04/20/20  1102    140  --  139   POTASSIUM 4.0 3.9 4.5 4.0   CHLORIDE 105 108  --  105   CO2 29 26  --  27   BUN 14 18  --  13   CR 1.02 1.09 0.93 0.92   ANIONGAP 3 6  --  7   SOLEDAD 8.4* 8.5  --  8.8   * 290*  --  248*     Most Recent 2 LFT's:  Recent Labs   Lab Test 07/10/20  1038 07/08/20  1719   AST 42 46*   ALT 56 61   ALKPHOS 67 73   BILITOTAL 1.5* 1.2     Most Recent 3 INR's:  Recent Labs   Lab Test 07/08/20  1719 11/27/17  1730 06/24/15  0953   INR 1.14 1.05 1.05   ,   Results for orders placed or performed during the hospital encounter of 07/08/20   Abdomen US, limited (RUQ only)    Narrative    EXAMINATION: Limited Abdominal Ultrasound, 7/8/2020 6:32 PM     COMPARISON: Ultrasound 11/27/2017    HISTORY: Evaluate for gallbladder pathology, liver pathology, ascites    FINDINGS:   Fluid: No evidence of ascites or pleural effusions.    Liver: The liver parenchyma is coarsened with peripheral nodularity,  measuring 18.2 cm in craniocaudal dimension. There is no focal mass.     Gallbladder: Multiple mobile shadowing gallstones. No gallbladder wall  thickening, pericholecystic fluid or sonographic Alcantara's sign.    Bile Ducts: Both the intra- and extrahepatic  biliary system are of  normal caliber.  The common bile duct measures 6 mm in diameter.    Pancreas: Pancreas is largely obscured by overlying bowel gas.    Kidney: The right kidney measures 12.0 cm long. There is no  hydronephrosis or hydroureter, no shadowing renal calculi, cystic  lesion or mass.       Impression    IMPRESSION:   1.  Cholelithiasis, without additional sonographic findings to suggest  acute cholecystitis.  2.   Cirrhotic configuration of liver. No focal liver lesion.    I have personally reviewed the examination and initial interpretation  and I agree with the findings.    MATTEO LEMOS MD   CT Abdomen Pelvis w Contrast     Value    Radiologist flags New portal vein/SMV thrombus (Urgent)    Narrative    EXAMINATION: CT ABDOMEN PELVIS W CONTRAST, 7/8/2020 9:13 PM    TECHNIQUE:  Helical CT images from the lung bases through the  symphysis pubis were obtained with IV contrast. Contrast dose:  iopamidol (ISOVUE-370) solution 135 mL    COMPARISON: CT abdomen pelvis 11/27/2017    HISTORY: Right upper quadrant pain, history of cirrhosis, evaluate for  pathology    FINDINGS:    Abdomen and pelvis: Cirrhotic configuration of liver. No focal liver  lesions. Multiple layering stones in the gallbladder. No intrahepatic  or extrahepatic biliary dilatation. The pancreas is unremarkable.  Spleen is enlarged and measures 14.6 cm in craniocaudal dimension.  Adrenal glands are unremarkable. Normal symmetric enhancement of both  kidneys. 4.6 cm exophytic cyst arising from the lateral mid left  kidney, previously measured 4.0 cm on 11/27/2017. Additional 2.0 cm  exophytic cyst the lower pole the left kidney, and 1.3 cm cyst in the  anterior mid right kidney. Several other subcentimeter hypodensities  in both kidneys are too small to characterize, favored to represent  additional cysts. No hydronephrosis or hydroureter. No renal or  ureteral stones. The urinary bladder, prostate and seminal vesicles  are  unremarkable. Small bilateral fat-containing inguinal hernias,  left greater than right. No abnormally dilated loops of small or large  bowel. Colonic diverticulosis, without convincing evidence of acute  diverticulitis. Unremarkable appendix. Tiny fat-containing umbilical  hernia. Mild fat stranding is seen adjacent to the gallbladder and  along the colon at the hepatic flexure (series 5, image 147)    There is eccentric thrombus within the main portal vein extending into  the superior mesenteric vein (series 5, images 136-184), which is new  from 11/27/2017. Esophageal varices. Abdominal aorta is normal in  caliber. Several prominent portacaval lymph nodes measuring up to 10  mm in short axis, similar to prior. No other enlarged lymph nodes in  the abdomen or pelvis.    Lung bases: The heart is not enlarged. No pericardial effusion. The  lung bases are clear.    Bones and soft tissues: Posterior fusion instrumentation of the  posterior elements spanning T11/L2. Multilevel degenerative changes of  the spine. No acute or suspicious osseous lesions.      Impression    IMPRESSION:   1. There is eccentric thrombus within the main portal vein, extending  into the superior mesenteric vein, which is new from the previous CT  11/27/2017.  2. There is mild fat stranding in the vicinity of the gallbladder and  adjacent to the colon at the hepatic flexure. The same-day ultrasound  is not suggestive of acute cholecystitis. Findings could represent  diverticulitis, although correlation with patient's symptoms is  recommended.  3. Cirrhotic configuration of liver and evidence of portal  hypertension including splenomegaly and esophageal varices.    [Urgent Result: New portal vein/SMV thrombus]    Finding was identified on 7/8/2020 8:57 PM.     [facility] was contacted by Dr. Rocha at 7/8/2020 9:14 PM and verbalized  understanding of the urgent finding.     I have personally reviewed the examination and initial interpretation  and  I agree with the findings.    MATTEO LEMOS MD       Discharge Medications   Current Discharge Medication List      START taking these medications    Details   ciprofloxacin (CIPRO) 500 MG tablet Take 1 tablet (500 mg) by mouth every 12 hours for 12 doses  Qty: 12 tablet, Refills: 0    Associated Diagnoses: Diverticulitis of colon      metroNIDAZOLE (FLAGYL) 500 MG tablet Take 1 tablet (500 mg) by mouth every 8 hours for 17 doses  Qty: 17 tablet, Refills: 0    Associated Diagnoses: Diverticulitis of colon      ondansetron (ZOFRAN-ODT) 4 MG ODT tab Take 1 tablet (4 mg) by mouth every 6 hours as needed for nausea or vomiting  Qty: 10 tablet, Refills: 0    Associated Diagnoses: Diverticulitis of colon      traMADol (ULTRAM) 50 MG tablet Take 1 tablet (50 mg) by mouth every 6 hours as needed for moderate pain  Qty: 15 tablet, Refills: 0    Associated Diagnoses: Diverticulitis of colon         CONTINUE these medications which have CHANGED    Details   hydrochlorothiazide (HYDRODIURIL) 25 MG tablet Take 2 tablets (50 mg) by mouth daily    Associated Diagnoses: Hypertension goal BP (blood pressure) < 140/90         CONTINUE these medications which have NOT CHANGED    Details   cholecalciferol 25 MCG (1000 UT) TABS Take 1,000 Units by mouth daily      glutamine 500 MG capsule Take 500 mg by mouth daily      lactobacillus rhamnosus, GG, (CULTURELL) capsule Take 1 capsule by mouth 2 times daily      losartan (COZAAR) 100 MG tablet Take 1 tablet (100 mg) by mouth daily  Qty: 90 tablet, Refills: 1    Associated Diagnoses: Hypertension goal BP (blood pressure) < 140/90      metFORMIN (GLUCOPHAGE) 500 MG tablet Take 1 tablet (500 mg) by mouth 2 times daily (with meals)  Qty: 60 tablet, Refills: 2    Associated Diagnoses: Type 2 diabetes mellitus without complication, without long-term current use of insulin (H)      metoprolol succinate ER (TOPROL-XL) 50 MG 24 hr tablet 1 1/2 po daily  Qty: 45 tablet, Refills: 3     Associated Diagnoses: Hypertension goal BP (blood pressure) < 140/90      Multiple Vitamins-Minerals (MULTIVITAMIN ADULT PO) Take 1 tablet by mouth daily       omeprazole (PRILOSEC) 20 MG DR capsule TAKE 1 CAPSULE BY MOUTH ONCE DAILY 30 TO 60 MINUTES BEFORE A MEAL  Qty: 90 capsule, Refills: 0    Associated Diagnoses: Gastroesophageal reflux disease without esophagitis      spironolactone (ALDACTONE) 25 MG tablet Take 1 tablet (25 mg) by mouth daily  Qty: 30 tablet, Refills: 1    Associated Diagnoses: Hypertension goal BP (blood pressure) < 140/90      alcohol swab prep pads Use to swab area of injection/jose antonio as directed.  Qty: 100 each, Refills: 3    Associated Diagnoses: Type 2 diabetes mellitus without complication, without long-term current use of insulin (H)      blood glucose (NO BRAND SPECIFIED) test strip Use to test blood sugar 1 times daily or as directed. To accompany: Blood Glucose Monitor Brands: per insurance.  Qty: 100 strip, Refills: 6    Associated Diagnoses: Type 2 diabetes mellitus without complication, without long-term current use of insulin (H)      blood glucose calibration (NO BRAND SPECIFIED) solution To accompany: Blood Glucose Monitor Brands: per insurance.  Qty: 1 Bottle, Refills: 3    Associated Diagnoses: Type 2 diabetes mellitus without complication, without long-term current use of insulin (H)      blood glucose monitoring (NO BRAND SPECIFIED) meter device kit Use to test blood sugar 1 times daily or as directed. Preferred blood glucose meter OR supplies to accompany: Blood Glucose Monitor Brands: per insurance.  Qty: 1 kit, Refills: 0    Associated Diagnoses: Type 2 diabetes mellitus without complication, without long-term current use of insulin (H)      thin (NO BRAND SPECIFIED) lancets Use with lanceting device. To accompany: Blood Glucose Monitor Brands: per insurance.  Qty: 100 each, Refills: 3    Associated Diagnoses: Type 2 diabetes mellitus without complication, without  long-term current use of insulin (H)           Allergies   Allergies   Allergen Reactions     Influenza Vaccines Other (See Comments)     delirium  fever

## 2020-07-10 NOTE — PROGRESS NOTES
Care Coordinator  D: Gucci Logan is a 70 year old male admitted on 7/8/2020. He has a prior history of DMII, obesity, SHONDA, hypertension, GERD, cirrhosis who presented to ED with abdominal pain and loose stools--note per VU Dixon. Pt may discharge home today on metformin.  I: Per chart review, discharge orders say pt is to f/u with PCP and he does not have one listed. I met with him and he is very pleasant and he sees Dr Richi Grant at the [facility].  A: discharge to home  P: I explained the medicare very important message  And he wishes to go home--he asked me to sign it and copy is in his chart.

## 2020-07-13 ENCOUNTER — PATIENT OUTREACH (OUTPATIENT)
Dept: CARE COORDINATION | Facility: CLINIC | Age: 70
End: 2020-07-13

## 2020-07-13 ENCOUNTER — TELEPHONE (OUTPATIENT)
Dept: FAMILY MEDICINE | Facility: CLINIC | Age: 70
End: 2020-07-13

## 2020-07-13 NOTE — TELEPHONE ENCOUNTER
Reason for Call:  Same Day Appointment, Requested Provider:  Richi Jaramillo MD    PCP: No Ref-Primary, Physician    Reason for visit:Hospital Follow Up    Duration of symptoms: N/a    Have you been treated for this in the past? Yes - ER 7/8/20    Additional comments: Patient called and stated he would like his hopsital follow up to be an office visit. He is supposed to follow up the end of this week, preferably on Friday. He asked if Dr. Jaramillo could work him into his schedule. He requires 40 min appointments. Please call back to discuss.    Can we leave a detailed message on this number? YES    Phone number patient can be reached at: Home number on file [phone] (home)    Best Time: Anytime    Call taken on 7/13/2020 at 8:16 AM by Lorrie Watson

## 2020-07-15 ENCOUNTER — TELEPHONE (OUTPATIENT)
Dept: GASTROENTEROLOGY | Facility: CLINIC | Age: 70
End: 2020-07-15

## 2020-07-15 NOTE — TELEPHONE ENCOUNTER
[facility] Call Center    Phone Message    May a detailed message be left on voicemail: yes     Reason for Call: Other: Patient is calling in per the hospital discharge notes he was suppose to follow up with  within 2 weeks. I do not have anything in person for  within 2 weeks and the patient would like a in person visit. It also stated he needs a CMP done as well. Please follow up with the patient to discuss thank you.     Action Taken: Message routed to:  [facility] ([facility] hep    Travel Screening: Not Applicable

## 2020-07-17 ENCOUNTER — OFFICE VISIT (OUTPATIENT)
Dept: FAMILY MEDICINE | Facility: CLINIC | Age: 70
End: 2020-07-17
Payer: COMMERCIAL

## 2020-07-17 ENCOUNTER — TELEPHONE (OUTPATIENT)
Dept: GASTROENTEROLOGY | Facility: CLINIC | Age: 70
End: 2020-07-17

## 2020-07-17 VITALS
SYSTOLIC BLOOD PRESSURE: 152 MMHG | BODY MASS INDEX: 36.68 KG/M2 | TEMPERATURE: 98.5 F | WEIGHT: 263 LBS | DIASTOLIC BLOOD PRESSURE: 71 MMHG | HEART RATE: 81 BPM

## 2020-07-17 DIAGNOSIS — I81 PORTAL VEIN THROMBOSIS: ICD-10-CM

## 2020-07-17 DIAGNOSIS — E11.43 TYPE 2 DIABETES MELLITUS WITH DIABETIC AUTONOMIC NEUROPATHY, WITHOUT LONG-TERM CURRENT USE OF INSULIN (H): ICD-10-CM

## 2020-07-17 DIAGNOSIS — R10.11 RUQ ABDOMINAL PAIN: Primary | ICD-10-CM

## 2020-07-17 DIAGNOSIS — K74.60 HEPATIC CIRRHOSIS, UNSPECIFIED HEPATIC CIRRHOSIS TYPE, UNSPECIFIED WHETHER ASCITES PRESENT (H): ICD-10-CM

## 2020-07-17 DIAGNOSIS — I10 HYPERTENSION GOAL BP (BLOOD PRESSURE) < 140/90: ICD-10-CM

## 2020-07-17 DIAGNOSIS — K57.92 DIVERTICULITIS: ICD-10-CM

## 2020-07-17 LAB
ALBUMIN SERPL-MCNC: 3.6 G/DL (ref 3.4–5)
ALP SERPL-CCNC: 71 U/L (ref 40–150)
ALT SERPL W P-5'-P-CCNC: 75 U/L (ref 0–70)
ANION GAP SERPL CALCULATED.3IONS-SCNC: 6 MMOL/L (ref 3–14)
AST SERPL W P-5'-P-CCNC: 63 U/L (ref 0–45)
BASOPHILS # BLD AUTO: 0 10E9/L (ref 0–0.2)
BASOPHILS NFR BLD AUTO: 0.1 %
BILIRUB SERPL-MCNC: 1.2 MG/DL (ref 0.2–1.3)
BUN SERPL-MCNC: 19 MG/DL (ref 7–30)
CALCIUM SERPL-MCNC: 9.4 MG/DL (ref 8.5–10.1)
CHLORIDE SERPL-SCNC: 107 MMOL/L (ref 94–109)
CO2 SERPL-SCNC: 28 MMOL/L (ref 20–32)
CREAT SERPL-MCNC: 1.14 MG/DL (ref 0.66–1.25)
DIFFERENTIAL METHOD BLD: ABNORMAL
EOSINOPHIL # BLD AUTO: 0.1 10E9/L (ref 0–0.7)
EOSINOPHIL NFR BLD AUTO: 1.6 %
ERYTHROCYTE [DISTWIDTH] IN BLOOD BY AUTOMATED COUNT: 13.6 % (ref 10–15)
GFR SERPL CREATININE-BSD FRML MDRD: 65 ML/MIN/{1.73_M2}
GLUCOSE SERPL-MCNC: 145 MG/DL (ref 70–99)
HCT VFR BLD AUTO: 42.8 % (ref 40–53)
HGB BLD-MCNC: 14.5 G/DL (ref 13.3–17.7)
LYMPHOCYTES # BLD AUTO: 1 10E9/L (ref 0.8–5.3)
LYMPHOCYTES NFR BLD AUTO: 14.2 %
MCH RBC QN AUTO: 33.1 PG (ref 26.5–33)
MCHC RBC AUTO-ENTMCNC: 33.9 G/DL (ref 31.5–36.5)
MCV RBC AUTO: 98 FL (ref 78–100)
MONOCYTES # BLD AUTO: 0.7 10E9/L (ref 0–1.3)
MONOCYTES NFR BLD AUTO: 10.6 %
NEUTROPHILS # BLD AUTO: 4.9 10E9/L (ref 1.6–8.3)
NEUTROPHILS NFR BLD AUTO: 73.5 %
PLATELET # BLD AUTO: 101 10E9/L (ref 150–450)
POTASSIUM SERPL-SCNC: 4.1 MMOL/L (ref 3.4–5.3)
PROT SERPL-MCNC: 7.4 G/DL (ref 6.8–8.8)
RBC # BLD AUTO: 4.38 10E12/L (ref 4.4–5.9)
SODIUM SERPL-SCNC: 141 MMOL/L (ref 133–144)
WBC # BLD AUTO: 6.7 10E9/L (ref 4–11)

## 2020-07-17 PROCEDURE — 85025 COMPLETE CBC W/AUTO DIFF WBC: CPT | Performed by: FAMILY MEDICINE

## 2020-07-17 PROCEDURE — 99495 TRANSJ CARE MGMT MOD F2F 14D: CPT | Performed by: FAMILY MEDICINE

## 2020-07-17 PROCEDURE — 80053 COMPREHEN METABOLIC PANEL: CPT | Performed by: FAMILY MEDICINE

## 2020-07-17 PROCEDURE — 36415 COLL VENOUS BLD VENIPUNCTURE: CPT | Performed by: FAMILY MEDICINE

## 2020-07-17 NOTE — PROGRESS NOTES
Subjective     Gucci Logan is a 70 year old male who presents to clinic today for the following health issues:    \[facility]\""         Hospital Follow-up Visit:    Hospital/Nursing Home/IP Rehab Facility: [facility]  Date of Admission: 07/08/2020  Date of Discharge: 07/10/2020  Reason(s) for Admission: diverticulitis      Was your hospitalization related to COVID-19? No   Problems taking medications regularly:  None  Medication changes since discharge: None  Problems adhering to non-medication therapy:  None    Summary of hospitalization:  [facility] discharge summary reviewed  Diagnostic Tests/Treatments reviewed.  Follow up needed: none  Other Healthcare Providers Involved in Patient s Care:         None  Update since discharge: improved.       Post Discharge Medication Reconciliation: discharge medications reconciled, continue medications without change.  Plan of care communicated with patient                        Reviewed and updated as needed this visit by Provider         Review of Systems   Was in hospital    Feeling good, at least better  Not great    To see liver specialist August 6      almost done with antibiotics    a1 7.5    Tramadol helped    Not needing much      Objective    BP (!) 152/71 (BP Location: Right arm, Patient Position: Chair, Cuff Size: Adult Regular)   Pulse 81   Temp 98.5  F (36.9  C) (Oral)   Wt 119.3 kg (263 lb)   BMI 36.68 kg/m    Body mass index is 36.68 kg/m .  Physical Exam  Constitutional:       Appearance: He is well-developed.   HENT:      Head: Normocephalic and atraumatic.   Eyes:      Conjunctiva/sclera: Conjunctivae normal.   Neck:      Vascular: No carotid bruit.   Cardiovascular:      Rate and Rhythm: Normal rate and regular rhythm.      Heart sounds: Normal heart sounds.   Pulmonary:      Effort: Pulmonary effort is normal. No respiratory distress.      Breath sounds: Normal breath sounds.   Neurological:      Mental Status: He is alert  and oriented to person, place, and time.      Cranial Nerves: No cranial nerve deficit.   Psychiatric:         Speech: Speech normal.         Behavior: Behavior normal.      abd soft, some tenderness right upper quadrant and left lower quadrant   No peritoneal signs        Diagnostic Test Results:  Labs reviewed in Epic         ASSESSMENT / PLAN:  (R10.11) RUQ abdominal pain  (primary encounter diagnosis)  Comment: check labs ; meet with surgeon as planned for gall bladder  Plan: Comprehensive metabolic panel, CBC with         platelets differential             (K57.92) Diverticulitis  Comment: finish out antibiotics   Plan: follow up prn symptoms     (K74.60) Hepatic cirrhosis, unspecified hepatic cirrhosis type, unspecified whether ascites present (H)  Comment: he is to meet with liver specialist soon  Plan: as above     (I81) Portal vein thrombosis  Comment: talk with liver specialist about this  Plan: I did suggest at least adding 81 asa     (E11.43) Type 2 diabetes mellitus with diabetic autonomic neuropathy, without long-term current use of insulin (H)  Comment: hemoglobin a1c in hospital much better 7.5  Plan: continue metformin     (I10) Hypertension goal BP (blood pressure) < 140/90  Comment: high bp  Plan: monitor      I reviewed the patient's medications, allergies, medical history, family history, and social history.    Richi Jaramillo MD

## 2020-07-17 NOTE — LETTER
[facility]   [address] [place]  [address]  [phone]                                   July 20, 2020    Gucci Logan  [address] [facility] [address]        Dear Gucci,    Nothing real worrisome on lab results.     See the surgeon and liver specialists as planned.     Results for orders placed or performed in visit on 07/17/20   Comprehensive metabolic panel     Status: Abnormal   Result Value Ref Range    Sodium 141 133 - 144 mmol/L    Potassium 4.1 3.4 - 5.3 mmol/L    Chloride 107 94 - 109 mmol/L    Carbon Dioxide 28 20 - 32 mmol/L    Anion Gap 6 3 - 14 mmol/L    Glucose 145 (H) 70 - 99 mg/dL    Urea Nitrogen 19 7 - 30 mg/dL    Creatinine 1.14 0.66 - 1.25 mg/dL    GFR Estimate 65 >60 mL/min/[1.73_m2]    GFR Estimate If Black 76 >60 mL/min/[1.73_m2]    Calcium 9.4 8.5 - 10.1 mg/dL    Bilirubin Total 1.2 0.2 - 1.3 mg/dL    Albumin 3.6 3.4 - 5.0 g/dL    Protein Total 7.4 6.8 - 8.8 g/dL    Alkaline Phosphatase 71 40 - 150 U/L    ALT 75 (H) 0 - 70 U/L    AST 63 (H) 0 - 45 U/L   CBC with platelets differential     Status: Abnormal   Result Value Ref Range    WBC 6.7 4.0 - 11.0 10e9/L    RBC Count 4.38 (L) 4.4 - 5.9 10e12/L    Hemoglobin 14.5 13.3 - 17.7 g/dL    Hematocrit 42.8 40.0 - 53.0 %    MCV 98 78 - 100 fl    MCH 33.1 (H) 26.5 - 33.0 pg    MCHC 33.9 31.5 - 36.5 g/dL    RDW 13.6 10.0 - 15.0 %    Platelet Count 101 (L) 150 - 450 10e9/L    % Neutrophils 73.5 %    % Lymphocytes 14.2 %    % Monocytes 10.6 %    % Eosinophils 1.6 %    % Basophils 0.1 %    Absolute Neutrophil 4.9 1.6 - 8.3 10e9/L    Absolute Lymphocytes 1.0 0.8 - 5.3 10e9/L    Absolute Monocytes 0.7 0.0 - 1.3 10e9/L    Absolute Eosinophils 0.1 0.0 - 0.7 10e9/L    Absolute Basophils 0.0 0.0 - 0.2 10e9/L    Diff Method Automated Method        If you have any questions please call the clinic at [phone]    Sincerely,    Richi Jaramillo MD  bmd

## 2020-07-17 NOTE — PATIENT INSTRUCTIONS
Finish out antibiotics    Start one 81 mg aspirin daily    Talk with liver doc  About both liver and  Thrombosis and gall bladder    See  Surgeon as planned

## 2020-08-03 ENCOUNTER — TELEPHONE (OUTPATIENT)
Dept: FAMILY MEDICINE | Facility: CLINIC | Age: 70
End: 2020-08-03

## 2020-08-03 NOTE — TELEPHONE ENCOUNTER
Attempt # 1  Called patient at home number.[phone] (home)  Was call answered?  Yes, got up this am the left ankle (the good ankle) every time takes a step feels like something moving up and down as he steps, has been working and using it and now feels normal. Has neuropathy so cannot feel much in the ankles/feet.  No pain. Left ankle near the front of the leg around the ankle joint up about 3 or 4 inches up leg. No swelling or discoloration. Looks pretty normal.    Sees liver/gallbladder dude this Thursday. Wants PCP to know this.        Tika Mistry RN  [facility]

## 2020-08-03 NOTE — TELEPHONE ENCOUNTER
Reason for call:  Patient reporting a symptom    Symptom or request:  Left ankle feels like something is loose in it. It is not in the joint. Feels like a vein or tendon.     Duration (how long have symptoms been present):  Noticed it this morning.    Have you been treated for this before? No    Additional comments:     Phone Number patient can be reached at:  Home number on file [phone] (home)    Best Time:  any    Can we leave a detailed message on this number:  YES    Call taken on 8/3/2020 at 10:57 AM by Arina Coppola

## 2020-08-04 NOTE — TELEPHONE ENCOUNTER
Called patient to let him know labs are not needed before his appointment 8/6.      Kylee SNELL LPN  Hepatology Clinic

## 2020-08-04 NOTE — TELEPHONE ENCOUNTER
Patient is scheduled for an in person visit with Dr. Humphreys on 08/06/20 and is requesting to know if labs are needed for this appointment. Patient stated he had labs done while his time at hospitalized back on 07/08.    Please call to advise.

## 2020-08-06 ENCOUNTER — OFFICE VISIT (OUTPATIENT)
Dept: GASTROENTEROLOGY | Facility: CLINIC | Age: 70
End: 2020-08-06
Attending: FAMILY MEDICINE
Payer: COMMERCIAL

## 2020-08-06 VITALS
SYSTOLIC BLOOD PRESSURE: 159 MMHG | OXYGEN SATURATION: 95 % | HEART RATE: 57 BPM | TEMPERATURE: 97.9 F | WEIGHT: 259.2 LBS | DIASTOLIC BLOOD PRESSURE: 77 MMHG | BODY MASS INDEX: 36.15 KG/M2

## 2020-08-06 DIAGNOSIS — I81 PORTAL VEIN THROMBOSIS: ICD-10-CM

## 2020-08-06 DIAGNOSIS — K74.69 OTHER CIRRHOSIS OF LIVER (H): Primary | ICD-10-CM

## 2020-08-06 NOTE — LETTER
8/6/2020         RE: Gucci Logan  [address] [facility] [address]        Dear Colleague,    Thank you for referring your patient, Gucci Logan, to the [facility] HEPATOLOGY. Please see a copy of my visit note below.    GI CLINIC VISIT    CC/REFERRING PROVIDER:  Physician No Ref-Primary  REASON FOR CONSULTATION: PVT, cirhosis    HPI:    Cirrhosis:  Diagnosed: liver biopsy 05/2008.  Etiology: HCV [diagnosed 2004, status post SVR], possible component of nonalcoholic fatty liver disease given presence of central obesity, hypertension, diabetes  MELD-Na of  9 (<2% 90-day mortality), Child-Guy-Jay score of 6 (Class A)   Hepatic encephalopathy: None  Ascites: None  SBP prophylaxis: Not indicated  TIPS: None  Esophageal/Gastric varices: Last EGD was done 4/2019 with small varices  Hepatocellular carcinoma: Last USS was done 7/2020 with no HCC    Transplant: Low MELD     70-year-old male with a medical history as documented below, but pertinent for compensated cirrhosis seen in clinic for follow-up of his cirrhosis and newly diagnosed portal venous thrombosis.  He was seen in the hospital initially with acute on chronic abdominal pain, and managed for acute diverticulitis as well as symptomatic gallstone disease.  He is planned to follow-up with surgery soon. He was found to have PVT at that time as well.  Since discharge, he has done well, but complains about chronic back and knee pain.  He denies any melena, hematochezia, abdominal swelling or leg swelling.  He denies any confusion, fevers or chills.  He is also trying to lose weight, and recently obtained a fit bit which helps him.  He denies any alcohol use.  Of notes, he is hepatitis C was treated in 2008, and he was seen at that time for management of his cirrhosis.  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  He achieved  sustained biological response with ribavirin and interferon.  He has never had any episodes of decompensation from his cirrhosis.    ROS: 10pt ROS performed and otherwise negative.    PERTINENT PAST MEDICAL/SURGICAL HISTORY:  Past Medical History:   Diagnosis Date     Arthritis      Esophageal reflux 3/1/2014     Hearing problem      Hiatal hernia      Hypertension      Liver disease      Obesity 1/24/2013     Obstructive sleep apnea      Sleep apnea     Advised CPAP machine. Not keen to use it.       PERTINENT MEDICATIONS:    Current Outpatient Medications:      cholecalciferol 25 MCG (1000 UT) TABS, Take 1,000 Units by mouth daily, Disp: , Rfl:      hydrochlorothiazide (HYDRODIURIL) 25 MG tablet, Take 2 tablets (50 mg) by mouth daily, Disp: , Rfl:      lactobacillus rhamnosus, GG, (CULTURELL) capsule, Take 1 capsule by mouth 2 times daily, Disp: , Rfl:      losartan (COZAAR) 100 MG tablet, Take 1 tablet (100 mg) by mouth daily, Disp: 90 tablet, Rfl: 1     metFORMIN (GLUCOPHAGE) 500 MG tablet, Take 1 tablet (500 mg) by mouth 2 times daily (with meals), Disp: 60 tablet, Rfl: 2     metoprolol succinate ER (TOPROL-XL) 50 MG 24 hr tablet, 1 1/2 po daily (Patient taking differently: Take 75 mg by mouth daily ), Disp: 45 tablet, Rfl: 3     Multiple Vitamins-Minerals (MULTIVITAMIN ADULT PO), Take 1 tablet by mouth daily , Disp: , Rfl:      omeprazole (PRILOSEC) 20 MG DR capsule, TAKE 1 CAPSULE BY MOUTH ONCE DAILY 30 TO 60 MINUTES BEFORE A MEAL, Disp: 90 capsule, Rfl: 0     ondansetron (ZOFRAN-ODT) 4 MG ODT tab, Take 1 tablet (4 mg) by mouth every 6 hours as needed for nausea or vomiting, Disp: 10 tablet, Rfl: 0     spironolactone (ALDACTONE) 25 MG tablet, Take 1 tablet (25 mg) by mouth daily, Disp: 30 tablet, Rfl: 1     traMADol (ULTRAM) 50 MG tablet, Take 1 tablet (50 mg) by mouth every 6 hours as needed for moderate pain, Disp: 15 tablet, Rfl: 0     alcohol swab prep pads, Use to swab area of injection/jose antonio as  directed. (Patient not taking: Reported on 8/6/2020), Disp: 100 each, Rfl: 3     blood glucose (NO BRAND SPECIFIED) test strip, Use to test blood sugar 1 times daily or as directed. To accompany: Blood Glucose Monitor Brands: per insurance. (Patient not taking: Reported on 8/6/2020), Disp: 100 strip, Rfl: 6     blood glucose calibration (NO BRAND SPECIFIED) solution, To accompany: Blood Glucose Monitor Brands: per insurance. (Patient not taking: Reported on 8/6/2020), Disp: 1 Bottle, Rfl: 3     blood glucose monitoring (NO BRAND SPECIFIED) meter device kit, Use to test blood sugar 1 times daily or as directed. Preferred blood glucose meter OR supplies to accompany: Blood Glucose Monitor Brands: per insurance. (Patient not taking: Reported on 8/6/2020), Disp: 1 kit, Rfl: 0     glutamine 500 MG capsule, Take 500 mg by mouth daily, Disp: , Rfl:      thin (NO BRAND SPECIFIED) lancets, Use with lanceting device. To accompany: Blood Glucose Monitor Brands: per insurance. (Patient not taking: Reported on 8/6/2020), Disp: 100 each, Rfl: 3     PHYSICAL EXAMINATION:  Vitals reviewed  BP (!) 159/77   Pulse 57   Temp 97.9  F (36.6  C) (Oral)   Wt 117.6 kg (259 lb 3.2 oz)   SpO2 95%   BMI 36.15 kg/m    Wt Readings from Last 2 Encounters:   08/06/20 117.6 kg (259 lb 3.2 oz)   07/17/20 119.3 kg (263 lb)     Gen: aaox3, cooperative, pleasant, not diaphoretic,   HEENT: ncat, neck supple   Resp/CV without acute findings, not dyspneic/tachycardic  Abd: Obese, not distended, soft,   Ext: no c/c/e  Skin: warm, perfused, no jaundice  Neuro: grossly intact, no asterixis noted    PERTINENT STUDIES:  MELD-Na score: 9 at 7/10/2020 10:38 AM  MELD score: 9 at 7/10/2020 10:38 AM  Calculated from:  Serum Creatinine: 1.02 mg/dL at 7/10/2020 10:38 AM  Serum Sodium: 137 mmol/L at 7/10/2020 10:38 AM  Total Bilirubin: 1.5 mg/dL at 7/10/2020 10:38 AM  INR(ratio): 1.14 at 7/8/2020  5:19 PM  Age: 70 years     ASSESSMENT/PLAN:  70-year-old male with  a medical history as documented below, but pertinent for compensated cirrhosis seen in clinic for follow-up of his cirrhosis and newly diagnosed portal venous thrombosis.    1. Portal venous thrombosis       Compensated cirrhosis  Patient has newly diagnosed portal venous thrombosis with extension into the mesenteric vein, however this clot is nonocclusive.  Etiology of new clots assessment most likely due to cirrhosis, and so does not need hypercoagulable work-up.  Given that this clot is nonocclusive, it may resolve on its own without any anticoagulation.  His cirrhosis is well compensated, and he has no evidence of varices, encephalopathy, or ascites.  He has MELD score of 9. He is UTD with variceal and HCC surveillance. We may consider a Fibrosis scan in the future, and repeat EGD 4/2021.    2.  Symptomatic gallstones  Patient has follow-up with general surgery which was planned for on discharge from the hospital.  Provided patient today with general surgeon's name and phone number to call to make sure his appointment is set up.  Patient is low risk from a liver disease standpoint given that he is CTP class A, and his MELD is 9.      RECOMMENDATIONS:  -- Obtain ultrasound with dopplers in 6 months  -- Monitor transaminases, bilirubin, INR in 6 months  -- Ensure sodium restriction to 2000 mg per day  -- Adequate protein diet (1.2 - 1.5g/kg/day)   - Recommend multiple meals during the day, Snacks and shakes during the day/night   - Can use Ensure/Boost drinks TID   - Avoid raw shellfish and oysters (risk of Vibrio vulnificus infection)  -- Follow up with PCP and General surgery      Colorectal Cancer Screening  Last 4/2019, repeat in 5 years    RTC 6 months, sooner if symptomatic.      The visit lasted up to 40 minutes, with more than half of the time spent on counseling and education.  All questions were answered to patient's satisfaction    Patient seen and discussed with staff GI physician, Dr. Renteria, who agrees  with my assessment and plan.      Sunil Briggs MD  Transplant Hepatology fellow  PGY 6  [phone]     The patient was seen and examined.  The above assessment and plan was developed jointly with the fellow.       Thank you very much for allowing me to participate in the care of this patient.  If you have any questions regarding my recommendations, please do not hesitate to contact me.         Mata Renteria MD      Professor of Medicine  [employer]      Executive Medical Director, Solid Organ Transplant Program  [facility]          Again, thank you for allowing me to participate in the care of your patient.        Sincerely,        Sunil Briggs MD

## 2020-08-06 NOTE — NURSING NOTE
Chief Complaint   Patient presents with     RECHECK     Follow up     Blood pressure (!) 159/77, pulse 57, temperature 97.9  F (36.6  C), temperature source Oral, weight 117.6 kg (259 lb 3.2 oz), SpO2 95 %.    Tiffany Coleman on 8/6/2020 at 7:55 AM

## 2020-08-06 NOTE — PROGRESS NOTES
GI CLINIC VISIT    CC/REFERRING PROVIDER:  Physician No Ref-Primary  REASON FOR CONSULTATION: PVT, cirhosis    HPI:    Cirrhosis:  Diagnosed: liver biopsy 05/2008.  Etiology: HCV [diagnosed 2004, status post SVR], possible component of nonalcoholic fatty liver disease given presence of central obesity, hypertension, diabetes  MELD-Na of  9 (<2% 90-day mortality), Child-Guy-Jay score of 6 (Class A)   Hepatic encephalopathy: None  Ascites: None  SBP prophylaxis: Not indicated  TIPS: None  Esophageal/Gastric varices: Last EGD was done 4/2019 with small varices  Hepatocellular carcinoma: Last USS was done 7/2020 with no HCC    Transplant: Low MELD     70-year-old male with a medical history as documented below, but pertinent for compensated cirrhosis seen in clinic for follow-up of his cirrhosis and newly diagnosed portal venous thrombosis.  He was seen in the hospital initially with acute on chronic abdominal pain, and managed for acute diverticulitis as well as symptomatic gallstone disease.  He is planned to follow-up with surgery soon. He was found to have PVT at that time as well.  Since discharge, he has done well, but complains about chronic back and knee pain.  He denies any melena, hematochezia, abdominal swelling or leg swelling.  He denies any confusion, fevers or chills.  He is also trying to lose weight, and recently obtained a fit bit which helps him.  He denies any alcohol use.  Of notes, he is hepatitis C was treated in 2008, and he was seen at that time for management of his cirrhosis.  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  He achieved sustained biological response with ribavirin and interferon.  He has never had any episodes of decompensation from his cirrhosis.    ROS: 10pt ROS performed and otherwise negative.    PERTINENT PAST MEDICAL/SURGICAL HISTORY:  Past Medical History:    Diagnosis Date     Arthritis      Esophageal reflux 3/1/2014     Hearing problem      Hiatal hernia      Hypertension      Liver disease      Obesity 1/24/2013     Obstructive sleep apnea      Sleep apnea     Advised CPAP machine. Not keen to use it.       PERTINENT MEDICATIONS:    Current Outpatient Medications:      cholecalciferol 25 MCG (1000 UT) TABS, Take 1,000 Units by mouth daily, Disp: , Rfl:      hydrochlorothiazide (HYDRODIURIL) 25 MG tablet, Take 2 tablets (50 mg) by mouth daily, Disp: , Rfl:      lactobacillus rhamnosus, GG, (CULTURELL) capsule, Take 1 capsule by mouth 2 times daily, Disp: , Rfl:      losartan (COZAAR) 100 MG tablet, Take 1 tablet (100 mg) by mouth daily, Disp: 90 tablet, Rfl: 1     metFORMIN (GLUCOPHAGE) 500 MG tablet, Take 1 tablet (500 mg) by mouth 2 times daily (with meals), Disp: 60 tablet, Rfl: 2     metoprolol succinate ER (TOPROL-XL) 50 MG 24 hr tablet, 1 1/2 po daily (Patient taking differently: Take 75 mg by mouth daily ), Disp: 45 tablet, Rfl: 3     Multiple Vitamins-Minerals (MULTIVITAMIN ADULT PO), Take 1 tablet by mouth daily , Disp: , Rfl:      omeprazole (PRILOSEC) 20 MG DR capsule, TAKE 1 CAPSULE BY MOUTH ONCE DAILY 30 TO 60 MINUTES BEFORE A MEAL, Disp: 90 capsule, Rfl: 0     ondansetron (ZOFRAN-ODT) 4 MG ODT tab, Take 1 tablet (4 mg) by mouth every 6 hours as needed for nausea or vomiting, Disp: 10 tablet, Rfl: 0     spironolactone (ALDACTONE) 25 MG tablet, Take 1 tablet (25 mg) by mouth daily, Disp: 30 tablet, Rfl: 1     traMADol (ULTRAM) 50 MG tablet, Take 1 tablet (50 mg) by mouth every 6 hours as needed for moderate pain, Disp: 15 tablet, Rfl: 0     alcohol swab prep pads, Use to swab area of injection/jose antonio as directed. (Patient not taking: Reported on 8/6/2020), Disp: 100 each, Rfl: 3     blood glucose (NO BRAND SPECIFIED) test strip, Use to test blood sugar 1 times daily or as directed. To accompany: Blood Glucose Monitor Brands: per insurance. (Patient not  taking: Reported on 8/6/2020), Disp: 100 strip, Rfl: 6     blood glucose calibration (NO BRAND SPECIFIED) solution, To accompany: Blood Glucose Monitor Brands: per insurance. (Patient not taking: Reported on 8/6/2020), Disp: 1 Bottle, Rfl: 3     blood glucose monitoring (NO BRAND SPECIFIED) meter device kit, Use to test blood sugar 1 times daily or as directed. Preferred blood glucose meter OR supplies to accompany: Blood Glucose Monitor Brands: per insurance. (Patient not taking: Reported on 8/6/2020), Disp: 1 kit, Rfl: 0     glutamine 500 MG capsule, Take 500 mg by mouth daily, Disp: , Rfl:      thin (NO BRAND SPECIFIED) lancets, Use with lanceting device. To accompany: Blood Glucose Monitor Brands: per insurance. (Patient not taking: Reported on 8/6/2020), Disp: 100 each, Rfl: 3     PHYSICAL EXAMINATION:  Vitals reviewed  BP (!) 159/77   Pulse 57   Temp 97.9  F (36.6  C) (Oral)   Wt 117.6 kg (259 lb 3.2 oz)   SpO2 95%   BMI 36.15 kg/m    Wt Readings from Last 2 Encounters:   08/06/20 117.6 kg (259 lb 3.2 oz)   07/17/20 119.3 kg (263 lb)     Gen: aaox3, cooperative, pleasant, not diaphoretic,   HEENT: ncat, neck supple   Resp/CV without acute findings, not dyspneic/tachycardic  Abd: Obese, not distended, soft,   Ext: no c/c/e  Skin: warm, perfused, no jaundice  Neuro: grossly intact, no asterixis noted    PERTINENT STUDIES:  MELD-Na score: 9 at 7/10/2020 10:38 AM  MELD score: 9 at 7/10/2020 10:38 AM  Calculated from:  Serum Creatinine: 1.02 mg/dL at 7/10/2020 10:38 AM  Serum Sodium: 137 mmol/L at 7/10/2020 10:38 AM  Total Bilirubin: 1.5 mg/dL at 7/10/2020 10:38 AM  INR(ratio): 1.14 at 7/8/2020  5:19 PM  Age: 70 years     ASSESSMENT/PLAN:  70-year-old male with a medical history as documented below, but pertinent for compensated cirrhosis seen in clinic for follow-up of his cirrhosis and newly diagnosed portal venous thrombosis.    1. Portal venous thrombosis       Compensated cirrhosis  Patient has newly  diagnosed portal venous thrombosis with extension into the mesenteric vein, however this clot is nonocclusive.  Etiology of new clots assessment most likely due to cirrhosis, and so does not need hypercoagulable work-up.  Given that this clot is nonocclusive, it may resolve on its own without any anticoagulation.  His cirrhosis is well compensated, and he has no evidence of varices, encephalopathy, or ascites.  He has MELD score of 9. He is UTD with variceal and HCC surveillance. We may consider a Fibrosis scan in the future, and repeat EGD 4/2021.    2.  Symptomatic gallstones  Patient has follow-up with general surgery which was planned for on discharge from the hospital.  Provided patient today with general surgeon's name and phone number to call to make sure his appointment is set up.  Patient is low risk from a liver disease standpoint given that he is CTP class A, and his MELD is 9.      RECOMMENDATIONS:  -- Obtain ultrasound with dopplers in 6 months  -- Monitor transaminases, bilirubin, INR in 6 months  -- Ensure sodium restriction to 2000 mg per day  -- Adequate protein diet (1.2 - 1.5g/kg/day)   - Recommend multiple meals during the day, Snacks and shakes during the day/night   - Can use Ensure/Boost drinks TID   - Avoid raw shellfish and oysters (risk of Vibrio vulnificus infection)  -- Follow up with PCP and General surgery      Colorectal Cancer Screening  Last 4/2019, repeat in 5 years    RTC 6 months, sooner if symptomatic.      The visit lasted up to 40 minutes, with more than half of the time spent on counseling and education.  All questions were answered to patient's satisfaction    Patient seen and discussed with staff GI physician, Dr. Renteria, who agrees with my assessment and plan.      Sunil Briggs MD  Transplant Hepatology fellow  PGY 6  [phone]     The patient was seen and examined.  The above assessment and plan was developed jointly with the fellow.       Thank you very much for  allowing me to participate in the care of this patient.  If you have any questions regarding my recommendations, please do not hesitate to contact me.         Mata Renteria MD      Professor of Medicine  [employer]      Executive Medical Director, Solid Organ Transplant Program  [facility]

## 2020-08-06 NOTE — PATIENT INSTRUCTIONS
Follow up with Dr. Urbano (General surgery)  We will repeat an ultrasound of your liver in 6 months time and consider a Fibrosis Scan then as well  We will be repeating an upper endoscopy in 4/2021  Continue to limit salt intake as you are  Avoid alcohol

## 2020-08-12 ENCOUNTER — TELEPHONE (OUTPATIENT)
Dept: FAMILY MEDICINE | Facility: CLINIC | Age: 70
End: 2020-08-12

## 2020-08-12 ENCOUNTER — OFFICE VISIT (OUTPATIENT)
Dept: OTOLARYNGOLOGY | Facility: CLINIC | Age: 70
End: 2020-08-12
Payer: COMMERCIAL

## 2020-08-12 VITALS
OXYGEN SATURATION: 96 % | DIASTOLIC BLOOD PRESSURE: 80 MMHG | SYSTOLIC BLOOD PRESSURE: 142 MMHG | TEMPERATURE: 98.3 F | HEART RATE: 60 BPM

## 2020-08-12 DIAGNOSIS — D23.39 PAPILLOMA OF NOSE: Primary | ICD-10-CM

## 2020-08-12 PROCEDURE — 88304 TISSUE EXAM BY PATHOLOGIST: CPT | Performed by: OTOLARYNGOLOGY

## 2020-08-12 PROCEDURE — 99213 OFFICE O/P EST LOW 20 MIN: CPT | Mod: 25 | Performed by: OTOLARYNGOLOGY

## 2020-08-12 PROCEDURE — 31299 UNLISTED PX ACCESSORY SINUS: CPT | Performed by: OTOLARYNGOLOGY

## 2020-08-12 NOTE — LETTER
8/12/2020         RE: Gucci Logan  [address] [facility] [address]        Dear Colleague,    Thank you for referring your patient, Gucci Logan, to the [facility]. Please see a copy of my visit note below.    Chief Complaint - hoarseness recheck, nasal papilloma    History of Present Illness - Gucci Logan is a 69 year old male who returns with a history of hoarseness since a few years. Feels he has a cold often. He gets some sensation in left throat. I saw him for this 3/2020. He feels something collects in the left throat. He feels he wants to reach down in scratch it. He does drink alcohol, but very little. Coughs up yellow phlegm. he coughs up phlegm that he thinks is from below. No hemoptysis. No lumps in the head or neck. Former smoker. The patient notes a h/o reflux symptoms, but takes prilosec and that helps. What bothers him is cold symptoms. CT chest showed no lung disease. Has a cirrhotic liver with portal hypertension. H/o sinus surgery. Laryngoscopy showed lots of dryness, but no lesions. Voice is better.     He also had a left nasal septum lesion, likely papilloma. He returns for removal and biopsy.  He notes no bleeding.  No pain.  He has no see dermatology to check for any papillomas on his genitals.    Did have a recent hospitalization and noted he had a portal vein thrombosis.  He also has had issues with his gallbladder admitted this removed.    Past Medical History -   Patient Active Problem List   Diagnosis     Advanced directives, counseling/discussion     CARDIOVASCULAR SCREENING; LDL GOAL LESS THAN 130     Hypertension goal BP (blood pressure) < 140/90     Anxiety     Obesity, unspecified obesity severity, unspecified obesity type     Gastroesophageal reflux disease, esophagitis presence not specified     Hyperlipidemia LDL goal <100     Impaired fasting glucose     Obesity (BMI 35.0-39.9) with comorbidity (H)     Diabetes mellitus, type 2 (H)      Type 2 diabetes mellitus with diabetic autonomic neuropathy, without long-term current use of insulin (H)     Other cirrhosis of liver (H)     Abdominal pain     Portal vein thrombosis       Current Medications -   Current Outpatient Medications:      alcohol swab prep pads, Use to swab area of injection/jose antonio as directed. (Patient not taking: Reported on 8/6/2020), Disp: 100 each, Rfl: 3     blood glucose (NO BRAND SPECIFIED) test strip, Use to test blood sugar 1 times daily or as directed. To accompany: Blood Glucose Monitor Brands: per insurance. (Patient not taking: Reported on 8/6/2020), Disp: 100 strip, Rfl: 6     blood glucose calibration (NO BRAND SPECIFIED) solution, To accompany: Blood Glucose Monitor Brands: per insurance. (Patient not taking: Reported on 8/6/2020), Disp: 1 Bottle, Rfl: 3     blood glucose monitoring (NO BRAND SPECIFIED) meter device kit, Use to test blood sugar 1 times daily or as directed. Preferred blood glucose meter OR supplies to accompany: Blood Glucose Monitor Brands: per insurance. (Patient not taking: Reported on 8/6/2020), Disp: 1 kit, Rfl: 0     cholecalciferol 25 MCG (1000 UT) TABS, Take 1,000 Units by mouth daily, Disp: , Rfl:      glutamine 500 MG capsule, Take 500 mg by mouth daily, Disp: , Rfl:      hydrochlorothiazide (HYDRODIURIL) 25 MG tablet, Take 2 tablets (50 mg) by mouth daily, Disp: , Rfl:      lactobacillus rhamnosus, GG, (CULTURELL) capsule, Take 1 capsule by mouth 2 times daily, Disp: , Rfl:      losartan (COZAAR) 100 MG tablet, Take 1 tablet (100 mg) by mouth daily, Disp: 90 tablet, Rfl: 1     metFORMIN (GLUCOPHAGE) 500 MG tablet, Take 1 tablet (500 mg) by mouth 2 times daily (with meals), Disp: 60 tablet, Rfl: 2     metoprolol succinate ER (TOPROL-XL) 50 MG 24 hr tablet, 1 1/2 po daily (Patient taking differently: Take 75 mg by mouth daily ), Disp: 45 tablet, Rfl: 3     Multiple Vitamins-Minerals (MULTIVITAMIN ADULT PO), Take 1 tablet by mouth daily ,  Disp: , Rfl:      omeprazole (PRILOSEC) 20 MG DR capsule, TAKE 1 CAPSULE BY MOUTH ONCE DAILY 30 TO 60 MINUTES BEFORE A MEAL, Disp: 90 capsule, Rfl: 0     ondansetron (ZOFRAN-ODT) 4 MG ODT tab, Take 1 tablet (4 mg) by mouth every 6 hours as needed for nausea or vomiting, Disp: 10 tablet, Rfl: 0     spironolactone (ALDACTONE) 25 MG tablet, Take 1 tablet (25 mg) by mouth daily, Disp: 30 tablet, Rfl: 1     thin (NO BRAND SPECIFIED) lancets, Use with lanceting device. To accompany: Blood Glucose Monitor Brands: per insurance. (Patient not taking: Reported on 2020), Disp: 100 each, Rfl: 3     traMADol (ULTRAM) 50 MG tablet, Take 1 tablet (50 mg) by mouth every 6 hours as needed for moderate pain, Disp: 15 tablet, Rfl: 0    Allergies -   Allergies   Allergen Reactions     Influenza Vaccines Other (See Comments)     delirium  fever         Social History -   Social History     Socioeconomic History     Marital status: Single     Spouse name: Not on file     Number of children: 0     Years of education: 18     Highest education level: Not on file   Occupational History     Occupation: Contractor     Employer: S[employer]     Comment: Not working now   Social Needs     Financial resource strain: Not on file     Food insecurity     Worry: Not on file     Inability: Not on file     Transportation needs     Medical: Not on file     Non-medical: Not on file   Tobacco Use     Smoking status: Former Smoker     Packs/day: 0.00     Years: 5.00     Pack years: 0.00     Types: Cigarettes     Start date: 1990     Last attempt to quit: 1999     Years since quittin.2     Smokeless tobacco: Never Used   Substance and Sexual Activity     Alcohol use: No     Comment: quit in      Drug use: No     Sexual activity: Not Currently     Partners: Female   Lifestyle     Physical activity     Days per week: Not on file     Minutes per session: Not on file     Stress: Not on file   Relationships     Social connections     Talks on  phone: Not on file     Gets together: Not on file     Attends [employer]ism service: Not on file     Active member of club or organization: Not on file     Attends meetings of clubs or organizations: Not on file     Relationship status: Not on file     Intimate partner violence     Fear of current or ex partner: Not on file     Emotionally abused: Not on file     Physically abused: Not on file     Forced sexual activity: Not on file   Other Topics Concern     Parent/sibling w/ CABG, MI or angioplasty before 65F 55M? No   Social History Narrative     Not on file       Family History -   Family History   Problem Relation Age of Onset     Alzheimer Disease Mother 85     Dementia Mother      Cerebrovascular Disease Father 50     Cancer Father      Hypertension Father      Neurologic Disorder No family hx of      Diabetes No family hx of        Review of Systems - As per HPI and PMHx, otherwise 7 system review of the head and neck is negative.      Physical Exam  General - The patient is nontoxic.  Alert and oriented to person and place, answers questions and cooperates with examination appropriately.   Voice and Breathing - The patient was breathing comfortably without the use of accessory muscles. There was no wheezing, stridor, or stertor.  The patients voice was mildly hoarse.   Eyes - Extraocular movements intact.  Sclera were not icteric or injected, conjunctiva were pink and moist.  Nose - the patient had a left nasal septal papillomatous growth measuring approximately 7-8 mm. He has a separate lesion of the left nasal ala adjacent to the septum. No right side lesions.   Neck -  Soft, non-tender. Palpation of the occipital, submental, submandibular, internal jugular chain, and supraclavicular nodes did not demonstrate any abnormal lymph nodes or masses. Parotids without masses. Palpation of the thyroid was soft and smooth, with no nodules or goiter appreciated.  The trachea was mobile and midline. No pain of the  anterior neck.  Neurologic - CN II-XII are grossly intact. No focal neurologic deficits.     Procedure - I sprayed phenylephrine with lidocaine on the left nasal septum. I then injected this site with 1% lidocaine; 1:100,000 epinephrine (1 ml). I then used a 15 blade to excise the septum around the papilloma. I used an iris superficial to the septal cartilage to peal the papilloma and underlying mucosa and some perichondrium off the cartilage. I removed the entire papillomatous lesion and placed it in formalin. I removed a second lesion on the left nasal ala. Hemostasis was achieved with silver nitrate. I then applied surgicel and bacitracin on the septum.     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 I removed both today. I will have this sent to pathology to confirm papilloma and no malignancy.  I recommend he use Vaseline or bacitracin on the site 3 times a day to prevent crusting.  He can return in 2 weeks time to recheck the wound.    Dr. Dudley Adams  Otolaryngology  [facility]      Again, thank you for allowing me to participate in the care of your patient.        Sincerely,        Dudley Adams MD

## 2020-08-12 NOTE — PROGRESS NOTES
Chief Complaint - hoarseness recheck, nasal papilloma    History of Present Illness - Gucci Logan is a 69 year old male who returns with a history of hoarseness since a few years. Feels he has a cold often. He gets some sensation in left throat. I saw him for this 3/2020. He feels something collects in the left throat. He feels he wants to reach down in scratch it. He does drink alcohol, but very little. Coughs up yellow phlegm. he coughs up phlegm that he thinks is from below. No hemoptysis. No lumps in the head or neck. Former smoker. The patient notes a h/o reflux symptoms, but takes prilosec and that helps. What bothers him is cold symptoms. CT chest showed no lung disease. Has a cirrhotic liver with portal hypertension. H/o sinus surgery. Laryngoscopy showed lots of dryness, but no lesions. Voice is better.     He also had a left nasal septum lesion, likely papilloma. He returns for removal and biopsy.  He notes no bleeding.  No pain.  He has no see dermatology to check for any papillomas on his genitals.    Did have a recent hospitalization and noted he had a portal vein thrombosis.  He also has had issues with his gallbladder admitted this removed.    Past Medical History -   Patient Active Problem List   Diagnosis     Advanced directives, counseling/discussion     CARDIOVASCULAR SCREENING; LDL GOAL LESS THAN 130     Hypertension goal BP (blood pressure) < 140/90     Anxiety     Obesity, unspecified obesity severity, unspecified obesity type     Gastroesophageal reflux disease, esophagitis presence not specified     Hyperlipidemia LDL goal <100     Impaired fasting glucose     Obesity (BMI 35.0-39.9) with comorbidity (H)     Diabetes mellitus, type 2 (H)     Type 2 diabetes mellitus with diabetic autonomic neuropathy, without long-term current use of insulin (H)     Other cirrhosis of liver (H)     Abdominal pain     Portal vein thrombosis       Current Medications -   Current Outpatient Medications:       alcohol swab prep pads, Use to swab area of injection/jose antonio as directed. (Patient not taking: Reported on 8/6/2020), Disp: 100 each, Rfl: 3     blood glucose (NO BRAND SPECIFIED) test strip, Use to test blood sugar 1 times daily or as directed. To accompany: Blood Glucose Monitor Brands: per insurance. (Patient not taking: Reported on 8/6/2020), Disp: 100 strip, Rfl: 6     blood glucose calibration (NO BRAND SPECIFIED) solution, To accompany: Blood Glucose Monitor Brands: per insurance. (Patient not taking: Reported on 8/6/2020), Disp: 1 Bottle, Rfl: 3     blood glucose monitoring (NO BRAND SPECIFIED) meter device kit, Use to test blood sugar 1 times daily or as directed. Preferred blood glucose meter OR supplies to accompany: Blood Glucose Monitor Brands: per insurance. (Patient not taking: Reported on 8/6/2020), Disp: 1 kit, Rfl: 0     cholecalciferol 25 MCG (1000 UT) TABS, Take 1,000 Units by mouth daily, Disp: , Rfl:      glutamine 500 MG capsule, Take 500 mg by mouth daily, Disp: , Rfl:      hydrochlorothiazide (HYDRODIURIL) 25 MG tablet, Take 2 tablets (50 mg) by mouth daily, Disp: , Rfl:      lactobacillus rhamnosus, GG, (CULTURELL) capsule, Take 1 capsule by mouth 2 times daily, Disp: , Rfl:      losartan (COZAAR) 100 MG tablet, Take 1 tablet (100 mg) by mouth daily, Disp: 90 tablet, Rfl: 1     metFORMIN (GLUCOPHAGE) 500 MG tablet, Take 1 tablet (500 mg) by mouth 2 times daily (with meals), Disp: 60 tablet, Rfl: 2     metoprolol succinate ER (TOPROL-XL) 50 MG 24 hr tablet, 1 1/2 po daily (Patient taking differently: Take 75 mg by mouth daily ), Disp: 45 tablet, Rfl: 3     Multiple Vitamins-Minerals (MULTIVITAMIN ADULT PO), Take 1 tablet by mouth daily , Disp: , Rfl:      omeprazole (PRILOSEC) 20 MG DR capsule, TAKE 1 CAPSULE BY MOUTH ONCE DAILY 30 TO 60 MINUTES BEFORE A MEAL, Disp: 90 capsule, Rfl: 0     ondansetron (ZOFRAN-ODT) 4 MG ODT tab, Take 1 tablet (4 mg) by mouth every 6 hours as needed for  nausea or vomiting, Disp: 10 tablet, Rfl: 0     spironolactone (ALDACTONE) 25 MG tablet, Take 1 tablet (25 mg) by mouth daily, Disp: 30 tablet, Rfl: 1     thin (NO BRAND SPECIFIED) lancets, Use with lanceting device. To accompany: Blood Glucose Monitor Brands: per insurance. (Patient not taking: Reported on 2020), Disp: 100 each, Rfl: 3     traMADol (ULTRAM) 50 MG tablet, Take 1 tablet (50 mg) by mouth every 6 hours as needed for moderate pain, Disp: 15 tablet, Rfl: 0    Allergies -   Allergies   Allergen Reactions     Influenza Vaccines Other (See Comments)     delirium  fever         Social History -   Social History     Socioeconomic History     Marital status: Single     Spouse name: Not on file     Number of children: 0     Years of education: 18     Highest education level: Not on file   Occupational History     Occupation: Contractor     Employer: SELF     Comment: Not working now   Social Needs     Financial resource strain: Not on file     Food insecurity     Worry: Not on file     Inability: Not on file     Transportation needs     Medical: Not on file     Non-medical: Not on file   Tobacco Use     Smoking status: Former Smoker     Packs/day: 0.00     Years: 5.00     Pack years: 0.00     Types: Cigarettes     Start date: 1990     Last attempt to quit: 1999     Years since quittin.2     Smokeless tobacco: Never Used   Substance and Sexual Activity     Alcohol use: No     Comment: quit in      Drug use: No     Sexual activity: Not Currently     Partners: Female   Lifestyle     Physical activity     Days per week: Not on file     Minutes per session: Not on file     Stress: Not on file   Relationships     Social connections     Talks on phone: Not on file     Gets together: Not on file     Attends Roman [employer] service: Not on file     Active member of club or organization: Not on file     Attends meetings of clubs or organizations: Not on file     Relationship status: Not on file      Intimate partner violence     Fear of current or ex partner: Not on file     Emotionally abused: Not on file     Physically abused: Not on file     Forced sexual activity: Not on file   Other Topics Concern     Parent/sibling w/ CABG, MI or angioplasty before 65F 55M? No   Social History Narrative     Not on file       Family History -   Family History   Problem Relation Age of Onset     Alzheimer Disease Mother 85     Dementia Mother      Cerebrovascular Disease Father 50     Cancer Father      Hypertension Father      Neurologic Disorder No family hx of      Diabetes No family hx of        Review of Systems - As per HPI and PMHx, otherwise 7 system review of the head and neck is negative.      Physical Exam  General - The patient is nontoxic.  Alert and oriented to person and place, answers questions and cooperates with examination appropriately.   Voice and Breathing - The patient was breathing comfortably without the use of accessory muscles. There was no wheezing, stridor, or stertor.  The patients voice was mildly hoarse.   Eyes - Extraocular movements intact.  Sclera were not icteric or injected, conjunctiva were pink and moist.  Nose - the patient had a left nasal septal papillomatous growth measuring approximately 7-8 mm. He has a separate lesion of the left nasal ala adjacent to the septum. No right side lesions.   Neck -  Soft, non-tender. Palpation of the occipital, submental, submandibular, internal jugular chain, and supraclavicular nodes did not demonstrate any abnormal lymph nodes or masses. Parotids without masses. Palpation of the thyroid was soft and smooth, with no nodules or goiter appreciated.  The trachea was mobile and midline. No pain of the anterior neck.  Neurologic - CN II-XII are grossly intact. No focal neurologic deficits.     Procedure - I sprayed phenylephrine with lidocaine on the left nasal septum. I then injected this site with 1% lidocaine; 1:100,000 epinephrine (1 ml). I then  used a 15 blade to excise the septum around the papilloma. I used an iris superficial to the septal cartilage to peal the papilloma and underlying mucosa and some perichondrium off the cartilage. I removed the entire papillomatous lesion and placed it in formalin. I removed a second lesion on the left nasal ala. Hemostasis was achieved with silver nitrate. I then applied surgicel and bacitracin on the septum.     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 I removed both today. I will have this sent to pathology to confirm papilloma and no malignancy.  I recommend he use Vaseline or bacitracin on the site 3 times a day to prevent crusting.  He can return in 2 weeks time to recheck the wound.    Dr. Dudley Adams  Otolaryngology  [facility]

## 2020-08-12 NOTE — TELEPHONE ENCOUNTER
Reason for Call:  Other     Detailed comments: patient would like to speak to RN or  In regards to recommendation that patient got for a surgery, he would like to know if PCP agrees and recommends same provider for procedure.    Phone Number Patient can be reached at: Home number on file [phone] (home)    Best Time: after 10:30 this morning    Can we leave a detailed message on this number? Not Applicable    Call taken on 8/12/2020 at 9:20 AM by Nelly Enriquez

## 2020-08-14 LAB — COPATH REPORT: NORMAL

## 2020-08-15 NOTE — TELEPHONE ENCOUNTER
I called and discussed in detail with patient.  He will follow up with N gen surg in clinic regarding gall bladder issue.  Richi Jaramillo MD

## 2020-08-19 DIAGNOSIS — R05.9 COUGH: ICD-10-CM

## 2020-08-19 DIAGNOSIS — R50.9 FEVER AND CHILLS: Primary | ICD-10-CM

## 2020-08-19 DIAGNOSIS — I10 HYPERTENSION GOAL BP (BLOOD PRESSURE) < 140/90: ICD-10-CM

## 2020-08-19 NOTE — TELEPHONE ENCOUNTER
Reason for call:  Patient reporting a symptom    Symptom or request: Possible COVID Exposure    Duration (how long have symptoms been present): Unknown    Have you been treated for this before? No    Additional comments: Pt thinks that he was possibly exposed to someone who tested positive for COVID at [facility]. Pt would like a call back to discuss.    Phone Number patient can be reached at:  Home number on file [phone] (home)    Best Time:  ASAP    Can we leave a detailed message on this number:  NO    Call taken on 8/19/2020 at 10:29 AM by Suma Glass

## 2020-08-19 NOTE — TELEPHONE ENCOUNTER
Attempt # 1  Called patient at home number. [phone] (home)     Was call answered?  Yes, might have been exposed to someone with covid, Monday morning had a heated discussion with a jasso on a job, who got within 6 feet of patient and was not wearing mask, police were called, found out the jasso was in [place]. This morning woke up with a sore throat, slight cough, no fever, was in office and smelled a smell like acetone.  Requesting Tramadol for pain due to pain in stomach. Gallbladder pain - knows has gallbladder issues.     Patient requesting Covid testing ? See below covid screening questions.          Do you have:     Fever >100.0 -NO  New cough - YES  Shortness of breath  Chills  New loss of taste or smell  -NO    Generalized body aches  New persistent headache  New sore throat - YES  New rash  Nausea, vomiting or diarrhea -      Within the past 3 weeks, have you been exposed to someone with a known positive COVID 19 test? NO  Have you traveled anywhere?  -NO  Patient also asked about his spironolactone refill, nurse sees is pended but not in refill pool.    Routing to provider        Tika Mistry RN  [facility]

## 2020-08-19 NOTE — TELEPHONE ENCOUNTER
"I agree with covid testing.   Will have Dr. Jaramillo determine tramadol need as he knows patient the best            Discharge Instructions for COVID-19 Patients  You have--or may have--COVID-19. Please follow the instructions listed below.   If you have a weakened immune system, discuss with your doctor any other actions you need to take.  How can I protect others?  If you have symptoms (fever, cough, body aches or trouble breathing):    Stay home and away from others (self-isolate) until:  ? At least 10 days have passed since your symptoms started. And   ? You've had no fever--and no medicine that reduces fever--for 3 full days (72 hours). And   ? Your other symptoms have resolved (gotten better).  If you don't show symptoms, but testing showed that you have COVID-19:    Stay home and away from others (self-isolate) until at least 10 days have passed since the date of your first positive COVID-19 test.  During this time    Stay in your own room, even for meals. Use your own bathroom if you can.    Stay away from others in your home. No hugging, kissing or shaking hands. No visitors.    Don't go to work, school or anywhere else.    Clean \"high touch\" surfaces often (doorknobs, counters, handles). Use household cleaning spray or wipes. You'll find a full list of  on the EPA website: www.epa.gov/pesticide-registration/list-n-disinfectants-use-against-sars-cov-2.    Cover your mouth and nose with a mask, tissue or wash cloth to avoid spreading germs.    Wash your hands and face often. Use soap and water.    Caregivers in these groups are at risk for severe illness due to COVID-19:  ? People 65 years and older  ? People who live in a nursing home or long-term care facility  ? People with chronic disease (lung, heart, cancer, diabetes, kidney, liver, immunologic)  ? People who have a weakened immune system, including those who:    Are in cancer treatment    Take medicine that weakens the immune system, such as " corticosteroids    Had a bone marrow or organ transplant    Have an immune deficiency    Have poorly controlled HIV or AIDS    Are obese (body mass index of 40 or higher)    Smoke regularly    Caregivers should wear gloves while washing dishes, handling laundry and cleaning bedrooms and bathrooms.    Use caution when washing and drying laundry: Don't shake dirty laundry and use the warmest water setting that you can.    For more tips on managing your health at home, go to www.cdc.gov/coronavirus/2019-ncov/downloads/10Things.pdf.  How can I take care of myself at home?  1. Get lots of rest. Drink extra fluids (unless a doctor has told you not to).  2. Take Tylenol (acetaminophen) for fever or pain. If you have liver or kidney problems, ask your family doctor if it's okay to take Tylenol.     Adults can take either:  ? 650 mg (two 325 mg pills) every 4 to 6 hours, or   ? 1,000 mg (two 500 mg pills) every 8 hours as needed.  ? Note: Don't take more than 3,000 mg in one day. Acetaminophen is found in many medicines (both prescribed and over-the-counter medicines). Read all labels to be sure you don't take too much.   For children, check the Tylenol bottle for the right dose. The dose is based on the child's age or weight.  3. If you have other health problems (like cancer, heart failure, an organ transplant or severe kidney disease): Call your specialty clinic if you don't feel better in the next 2 days.  4. Know when to call 911. Emergency warning signs include:  ? Trouble breathing or shortness of breath  ? Pain or pressure in the chest that doesn't go away  ? Feeling confused like you haven't felt before, or not being able to wake up  ? Bluish-colored lips or face  5. Your doctor may have prescribed a blood thinner medicine. Follow their instructions.  Where can I get more information?     [facility] - About COVID-19:   [email or web address]    [facility] - What to Do If You're Sick:  www.cdc.gov/coronavirus/2019-ncov/about/steps-when-sick.html    CDC - Ending Home Isolation: www.cdc.gov/coronavirus/2019-ncov/hcp/disposition-in-home-patients.html    CDC - Caring for Someone: www.cdc.gov/coronavirus/2019-ncov/if-you-are-sick/care-for-someone.html    [facility] - Interim Guidance for Hospital Discharge to Home: www.[facility].mn./diseases/coronavirus/hcp/hospdischarge.pdf    [facility] clinical trials (COVID-19 research studies): [email or web address].[facility]-clinical-trials    Below are the COVID-19 hotlines at the [place] Department of Health ([facility]). Interpreters are available.  ? For health questions: Call 176-517-2773 or 1-335.937.6209 (7 a.m. to 7 p.m.)  ? For questions about schools and childcare: Call 838-827-2046 or 1-278.915.3082 (7 a.m. to 7 p.m.)    For informational purposes only. Not to replace the advice of your health care provider. Clinically reviewed by the Infection Prevention Team.Copyright   2020 [facility]. All rights reserved. [employer] [id number] - 06/20.

## 2020-08-20 RX ORDER — SPIRONOLACTONE 25 MG/1
25 TABLET ORAL DAILY
Qty: 30 TABLET | Refills: 1 | Status: SHIPPED | OUTPATIENT
Start: 2020-08-20 | End: 2020-09-11

## 2020-08-20 NOTE — TELEPHONE ENCOUNTER
"Patient requesting refill - is out of medication.    Requested Prescriptions   Pending Prescriptions Disp Refills     spironolactone (ALDACTONE) 25 MG tablet 30 tablet 1     Sig: Take 1 tablet (25 mg) by mouth daily       Diuretics (Including Combos) Protocol Failed - 8/20/2020  9:09 AM        Failed - Blood pressure under 140/90 in past 12 months     BP Readings from Last 3 Encounters:   08/12/20 (!) 142/80   08/06/20 (!) 159/77   07/17/20 (!) 152/71                 Passed - Recent (12 mo) or future (30 days) visit within the authorizing provider's specialty     Patient has had an office visit with the authorizing provider or a provider within the authorizing providers department within the previous 12 mos or has a future within next 30 days. See \"Patient Info\" tab in inbasket, or \"Choose Columns\" in Meds & Orders section of the refill encounter.              Passed - Medication is active on med list        Passed - Patient is age 18 or older        Passed - Normal serum creatinine on file in past 12 months     Recent Labs   Lab Test 07/17/20  1453   CR 1.14              Passed - Normal serum potassium on file in past 12 months     Recent Labs   Lab Test 07/17/20  1453   POTASSIUM 4.1                    Passed - Normal serum sodium on file in past 12 months     Recent Labs   Lab Test 07/17/20  1453                    Last Written Prescription Date:  5/19/2020  Last Fill Quantity: 30,  # refills: 1   Last office visit: 7/17/2020 with prescribing provider:  Deal   Future Office Visit:        Routing refill request to provider for review/approval because:  BP not within range - is out of pills.  Needs today.        Tika Mistry RN  Triage Nurse  [facility]  Appointment line: [phone]  [facility], 24 hour nurse line, available by calling clinic at [phone] and following prompts.                "

## 2020-08-20 NOTE — TELEPHONE ENCOUNTER
Attempt # 1  Called patient at home number.  Was call answered? Yes, relayed message from provider to patient who verbalized understanding and agreement with plan and had no questions.  Explained the Covid testing     Note for Dr. Jaramillo - gallbladder versus liver leaking to deal with the fat is going to try to deal with gallbladder but may need help with pain?    Patient complaining of sore throat is very sore today.      Pharmacy cued.       Tika Mistry RN  [facility]

## 2020-08-25 DIAGNOSIS — R50.9 FEVER AND CHILLS: ICD-10-CM

## 2020-08-25 DIAGNOSIS — R05.9 COUGH: ICD-10-CM

## 2020-08-26 LAB
SARS-COV-2 RNA SPEC QL NAA+PROBE: NOT DETECTED
SPECIMEN SOURCE: NORMAL

## 2020-08-27 ENCOUNTER — NURSE TRIAGE (OUTPATIENT)
Dept: NURSING | Facility: CLINIC | Age: 70
End: 2020-08-27

## 2020-08-27 NOTE — TELEPHONE ENCOUNTER
Canelo had a covid test and is calling for covid results.     Coronavirus (COVID-19) Notification    Lab Result   Lab test 2019-nCoV rRt-PCR OR SARS-COV-2 PCR    Nasopharyngeal AND/OR Oropharyngeal swab is NEGATIVE for 2019-nCoV RNA [OR] SARS-COV-2 RNA (COVID-19) RNA    Your result was negative. This means that we didn't find the virus that causes COVID-19 in your sample. A test may show negative when you do actually have the virus. This can happen when the virus is in the early stages of infection, before you feel illness symptoms.    If you have symptoms   Stay home and away from others (self-isolate) until you meet ALL of the guidelines below:    You've had no fever--and no medicine that reduces fever--for 1 full day (24 hours). And      Your other symptoms have gotten better. For example, your cough or breathing has improved. And   ; At least 10 days have passed since your symptoms started. (If you've been told by a doctor that you have a weak immune system, wait 20 days.)         During this time:    Stay home. Don't go to work, school or anywhere else.     Stay in your own room, including for meals. Use your own bathroom if you can.    Stay away from others in your home. No hugging, kissing or shaking hands. No visitors.    Clean  high touch  surfaces often (doorknobs, counters, handles, etc.). Use a household cleaning spray or wipes. You can find a full list on the EPA website at www.epa.gov/pesticide-registration/list-n-disinfectants-use-against-sars-cov-2.    Cover your mouth and nose with a mask, tissue or other face covering to avoid spreading germs.    Wash your hands and face often with soap and water.    Going back to work  Check with your employer for any guidelines to follow for going back to work.  You are sent a letter for your Employer which will serve as formal document notice that you, the employee, tested negative for COVID-19, as of the testing date shown above.    If your symptoms worsen or  other concerning symptoms, contact PCP, oncare or consider returning to Emergency Dept.    Where can I get more information?     [employer] [place]: [email or web address]    Coronavirus Basics: www.health.[facility].mn.us/diseases/coronavirus/basics.html    [facility] Hotline (011-613-8203)    {Name]  Gala Sin RN/FNA

## 2020-09-11 ENCOUNTER — OFFICE VISIT (OUTPATIENT)
Dept: FAMILY MEDICINE | Facility: CLINIC | Age: 70
End: 2020-09-11
Payer: COMMERCIAL

## 2020-09-11 VITALS
WEIGHT: 262.25 LBS | TEMPERATURE: 98.1 F | BODY MASS INDEX: 36.58 KG/M2 | OXYGEN SATURATION: 98 % | SYSTOLIC BLOOD PRESSURE: 124 MMHG | HEART RATE: 60 BPM | DIASTOLIC BLOOD PRESSURE: 58 MMHG

## 2020-09-11 DIAGNOSIS — J01.90 ACUTE SINUSITIS WITH SYMPTOMS > 10 DAYS: ICD-10-CM

## 2020-09-11 DIAGNOSIS — E78.5 HYPERLIPIDEMIA LDL GOAL <100: ICD-10-CM

## 2020-09-11 DIAGNOSIS — R53.83 FATIGUE, UNSPECIFIED TYPE: ICD-10-CM

## 2020-09-11 DIAGNOSIS — I10 HYPERTENSION GOAL BP (BLOOD PRESSURE) < 140/90: ICD-10-CM

## 2020-09-11 DIAGNOSIS — K80.20 SYMPTOMATIC CHOLELITHIASIS: Primary | ICD-10-CM

## 2020-09-11 DIAGNOSIS — K57.32 DIVERTICULITIS OF COLON: ICD-10-CM

## 2020-09-11 DIAGNOSIS — K21.9 GASTROESOPHAGEAL REFLUX DISEASE WITHOUT ESOPHAGITIS: ICD-10-CM

## 2020-09-11 DIAGNOSIS — R10.11 RUQ ABDOMINAL PAIN: ICD-10-CM

## 2020-09-11 DIAGNOSIS — E11.9 TYPE 2 DIABETES MELLITUS WITHOUT COMPLICATION, WITHOUT LONG-TERM CURRENT USE OF INSULIN (H): ICD-10-CM

## 2020-09-11 DIAGNOSIS — M79.661 PAIN OF RIGHT LOWER LEG: ICD-10-CM

## 2020-09-11 LAB — HBA1C MFR BLD: 6.5 % (ref 0–5.6)

## 2020-09-11 PROCEDURE — 83036 HEMOGLOBIN GLYCOSYLATED A1C: CPT | Performed by: FAMILY MEDICINE

## 2020-09-11 PROCEDURE — 84439 ASSAY OF FREE THYROXINE: CPT | Performed by: FAMILY MEDICINE

## 2020-09-11 PROCEDURE — 80053 COMPREHEN METABOLIC PANEL: CPT | Performed by: FAMILY MEDICINE

## 2020-09-11 PROCEDURE — 36415 COLL VENOUS BLD VENIPUNCTURE: CPT | Performed by: FAMILY MEDICINE

## 2020-09-11 PROCEDURE — 84443 ASSAY THYROID STIM HORMONE: CPT | Performed by: FAMILY MEDICINE

## 2020-09-11 PROCEDURE — 85025 COMPLETE CBC W/AUTO DIFF WBC: CPT | Performed by: FAMILY MEDICINE

## 2020-09-11 PROCEDURE — 99214 OFFICE O/P EST MOD 30 MIN: CPT | Performed by: FAMILY MEDICINE

## 2020-09-11 RX ORDER — HYDROCHLOROTHIAZIDE 50 MG/1
50 TABLET ORAL DAILY
Qty: 90 TABLET | Refills: 1 | Status: SHIPPED | OUTPATIENT
Start: 2020-09-11 | End: 2021-04-27

## 2020-09-11 RX ORDER — SPIRONOLACTONE 25 MG/1
25 TABLET ORAL DAILY
Qty: 90 TABLET | Refills: 1 | Status: SHIPPED | OUTPATIENT
Start: 2020-09-11 | End: 2021-02-22

## 2020-09-11 RX ORDER — TRAMADOL HYDROCHLORIDE 50 MG/1
50 TABLET ORAL EVERY 6 HOURS PRN
Qty: 20 TABLET | Refills: 0 | Status: SHIPPED | OUTPATIENT
Start: 2020-09-11 | End: 2020-11-02

## 2020-09-11 RX ORDER — ATORVASTATIN CALCIUM 10 MG/1
10 TABLET, FILM COATED ORAL DAILY
Qty: 90 TABLET | Refills: 0 | Status: SHIPPED | OUTPATIENT
Start: 2020-09-11 | End: 2020-12-08

## 2020-09-11 RX ORDER — METOPROLOL SUCCINATE 25 MG/1
TABLET, EXTENDED RELEASE ORAL
Qty: 270 TABLET | Refills: 1 | Status: SHIPPED | OUTPATIENT
Start: 2020-09-11 | End: 2021-03-29

## 2020-09-11 RX ORDER — LOSARTAN POTASSIUM 100 MG/1
100 TABLET ORAL DAILY
Qty: 90 TABLET | Refills: 1 | Status: SHIPPED | OUTPATIENT
Start: 2020-09-11 | End: 2021-02-10

## 2020-09-11 NOTE — LETTER
[facility]   [address] [place]  [address]  [phone]                                   September 17, 2020    Gucci Logan  [address] [facility] [address]        Dear Gucci,    The hemoglobin a1c ( diabetes test ) is very good.  Stay on metformin.     One thyroid test ( TSH ) is a bit off but the follow up test ( free T4 ) is normal so no need for thyroid medication.     Other labs are okay/ stable     Results for orders placed or performed in visit on 09/11/20   TSH with free T4 reflex     Status: Abnormal   Result Value Ref Range    TSH 4.83 (H) 0.40 - 4.00 mU/L   Comprehensive metabolic panel     Status: Abnormal   Result Value Ref Range    Sodium 139 133 - 144 mmol/L    Potassium 4.2 3.4 - 5.3 mmol/L    Chloride 105 94 - 109 mmol/L    Carbon Dioxide 28 20 - 32 mmol/L    Anion Gap 6 3 - 14 mmol/L    Glucose 101 (H) 70 - 99 mg/dL    Urea Nitrogen 23 7 - 30 mg/dL    Creatinine 1.08 0.66 - 1.25 mg/dL    GFR Estimate 70 >60 mL/min/[1.73_m2]    GFR Estimate If Black 81 >60 mL/min/[1.73_m2]    Calcium 9.3 8.5 - 10.1 mg/dL    Bilirubin Total 1.0 0.2 - 1.3 mg/dL    Albumin 3.7 3.4 - 5.0 g/dL    Protein Total 7.5 6.8 - 8.8 g/dL    Alkaline Phosphatase 84 40 - 150 U/L    ALT 69 0 - 70 U/L    AST 50 (H) 0 - 45 U/L   CBC with platelets differential     Status: Abnormal   Result Value Ref Range    WBC 5.0 4.0 - 11.0 10e9/L    RBC Count 4.22 (L) 4.4 - 5.9 10e12/L    Hemoglobin 14.0 13.3 - 17.7 g/dL    Hematocrit 41.7 40.0 - 53.0 %    MCV 99 78 - 100 fl    MCH 33.2 (H) 26.5 - 33.0 pg    MCHC 33.6 31.5 - 36.5 g/dL    RDW 12.8 10.0 - 15.0 %    Platelet Count 84 (L) 150 - 450 10e9/L    % Neutrophils 67.2 %    % Lymphocytes 18.1 %    % Monocytes 12.5 %    % Eosinophils 2.0 %    % Basophils 0.2 %    Absolute Neutrophil 3.4 1.6 - 8.3 10e9/L    Absolute Lymphocytes 0.9 0.8 - 5.3 10e9/L    Absolute Monocytes 0.6 0.0 - 1.3 10e9/L    Absolute Eosinophils 0.1 0.0 - 0.7 10e9/L     Absolute Basophils 0.0 0.0 - 0.2 10e9/L    Diff Method Automated Method     RBC Morphology Normal     Platelet Estimate       Automated count confirmed.  Platelet morphology is normal.   Hemoglobin A1c     Status: Abnormal   Result Value Ref Range    Hemoglobin A1C 6.5 (H) 0 - 5.6 %   T4 free     Status: None   Result Value Ref Range    T4 Free 1.03 0.76 - 1.46 ng/dL       If you have any questions please call the clinic at [phone]    Sincerely,    Richi Jaramillo MD  bmd

## 2020-09-11 NOTE — PATIENT INSTRUCTIONS
Take augmentin    See general surgeon for the gall bladder    Start simvastatin    We will send you lab results

## 2020-09-11 NOTE — PROGRESS NOTES
Subjective     Gucci Logan is a 70 year old male who presents to clinic today for the following health issues:    HPI       Follow up after hospital   Follow up after gallbladder problems  Leg problems       Review of Systems    wt coming down on purpose    Wants 90 days on meds    8000 to 89270 steps daily    Some wts     Stairs    Heat like pain right lower leg  Sometimes sharp    Spasm right ankle occasionally     Ankle pain right     Shoulder bad        Spleen/ liver/ gall bladder problems    Patient wants to hold off on delmis    Short of breath    Had papilloma removed from nose    Breathing better since that is done  Using vics etc since gets moist    Knee bothers some    Night sweats back     Sinus symptoms     Wonders about diverticuliltis        Objective    BP (!) 156/78 (BP Location: Right arm, Patient Position: Chair, Cuff Size: Adult Regular)   Pulse 60   Temp 98.1  F (36.7  C) (Oral)   Wt 119 kg (262 lb 4 oz)   SpO2 98%   BMI 36.58 kg/m    Body mass index is 36.58 kg/m .  Physical Exam  Constitutional:       Appearance: He is well-developed.   HENT:      Head: Normocephalic and atraumatic.   Eyes:      Conjunctiva/sclera: Conjunctivae normal.   Neck:      Vascular: No carotid bruit.   Cardiovascular:      Rate and Rhythm: Normal rate and regular rhythm.      Heart sounds: Normal heart sounds.   Pulmonary:      Effort: Pulmonary effort is normal. No respiratory distress.      Breath sounds: Normal breath sounds.   Neurological:      Mental Status: He is alert and oriented to person, place, and time.      Cranial Nerves: No cranial nerve deficit.   Psychiatric:         Speech: Speech normal.         Behavior: Behavior normal.        Good sensation both lower legs  Patient points to lateral right lower leg when asked where pain is  No swelling / discoloration  Strength okay but patient chronically has weak dorsiflexion     mild soreness right upper quadrant    ASSESSMENT / PLAN:  (K80.20)  Symptomatic cholelithiasis  (primary encounter diagnosis)  Comment: discussed in detail.  Prudent to see gen surg. Patient to schedule  Plan: GENERAL SURG ADULT REFERRAL             (J01.90) Acute sinusitis with symptoms > 10 days  Comment: patient has sinus symptoms for  A couple weeks, not improving   Plan: amoxicillin-clavulanate (AUGMENTIN) 875-125 MG         tablet        Follow up prn symptoms after antibiotics     (I10) Hypertension goal BP (blood pressure) < 140/90  Comment: now taking 50 daily of hydrochlorothiazide; refill meds.  He wanted 90  Day supply of all ongoing meds   Plan: hydrochlorothiazide (HYDRODIURIL) 50 MG tablet,        losartan (COZAAR) 100 MG tablet, metoprolol         succinate ER (TOPROL-XL) 25 MG 24 hr tablet,         spironolactone (ALDACTONE) 25 MG tablet             (E11.9) Type 2 diabetes mellitus without complication, without long-term current use of insulin (H)  Comment: refill med, check lab  Plan: metFORMIN (GLUCOPHAGE) 500 MG tablet,         Hemoglobin A1c             (K57.32) Diverticulitis of colon  Comment: use sparingly  Plan: traMADol (ULTRAM) 50 MG tablet             (K21.9) Gastroesophageal reflux disease without esophagitis  Comment: stay on ppi   Plan: omeprazole (PRILOSEC) 20 MG DR capsule             (E78.5) Hyperlipidemia LDL goal <100  Comment: start statin ; call with problems/ side effects   Plan: atorvastatin (LIPITOR) 10 MG tablet             (R10.11) RUQ abdominal pain  Comment: as above   Plan: GENERAL SURG ADULT REFERRAL             (R53.83) Fatigue, unspecified type  Comment: check   Plan: TSH with free T4 reflex, Comprehensive         metabolic panel, CBC with platelets         differential             (M79.661) Pain of right lower leg  Comment: very nonspecific symptoms.  Exam here not real helpful   Plan: follow up prn      I reviewed the patient's medications, allergies, medical history, family history, and social history.    Richi Jaramillo MD

## 2020-09-14 LAB
ALBUMIN SERPL-MCNC: 3.7 G/DL (ref 3.4–5)
ALP SERPL-CCNC: 84 U/L (ref 40–150)
ALT SERPL W P-5'-P-CCNC: 69 U/L (ref 0–70)
ANION GAP SERPL CALCULATED.3IONS-SCNC: 6 MMOL/L (ref 3–14)
AST SERPL W P-5'-P-CCNC: 50 U/L (ref 0–45)
BILIRUB SERPL-MCNC: 1 MG/DL (ref 0.2–1.3)
BUN SERPL-MCNC: 23 MG/DL (ref 7–30)
CALCIUM SERPL-MCNC: 9.3 MG/DL (ref 8.5–10.1)
CHLORIDE SERPL-SCNC: 105 MMOL/L (ref 94–109)
CO2 SERPL-SCNC: 28 MMOL/L (ref 20–32)
CREAT SERPL-MCNC: 1.08 MG/DL (ref 0.66–1.25)
GFR SERPL CREATININE-BSD FRML MDRD: 70 ML/MIN/{1.73_M2}
GLUCOSE SERPL-MCNC: 101 MG/DL (ref 70–99)
POTASSIUM SERPL-SCNC: 4.2 MMOL/L (ref 3.4–5.3)
PROT SERPL-MCNC: 7.5 G/DL (ref 6.8–8.8)
SODIUM SERPL-SCNC: 139 MMOL/L (ref 133–144)
T4 FREE SERPL-MCNC: 1.03 NG/DL (ref 0.76–1.46)
TSH SERPL DL<=0.005 MIU/L-ACNC: 4.83 MU/L (ref 0.4–4)

## 2020-09-15 LAB
BASOPHILS # BLD AUTO: 0 10E9/L (ref 0–0.2)
BASOPHILS NFR BLD AUTO: 0.2 %
DIFFERENTIAL METHOD BLD: ABNORMAL
EOSINOPHIL # BLD AUTO: 0.1 10E9/L (ref 0–0.7)
EOSINOPHIL NFR BLD AUTO: 2 %
ERYTHROCYTE [DISTWIDTH] IN BLOOD BY AUTOMATED COUNT: 12.8 % (ref 10–15)
HCT VFR BLD AUTO: 41.7 % (ref 40–53)
HGB BLD-MCNC: 14 G/DL (ref 13.3–17.7)
LYMPHOCYTES # BLD AUTO: 0.9 10E9/L (ref 0.8–5.3)
LYMPHOCYTES NFR BLD AUTO: 18.1 %
MCH RBC QN AUTO: 33.2 PG (ref 26.5–33)
MCHC RBC AUTO-ENTMCNC: 33.6 G/DL (ref 31.5–36.5)
MCV RBC AUTO: 99 FL (ref 78–100)
MONOCYTES # BLD AUTO: 0.6 10E9/L (ref 0–1.3)
MONOCYTES NFR BLD AUTO: 12.5 %
NEUTROPHILS # BLD AUTO: 3.4 10E9/L (ref 1.6–8.3)
NEUTROPHILS NFR BLD AUTO: 67.2 %
PLATELET # BLD AUTO: 84 10E9/L (ref 150–450)
PLATELET # BLD EST: ABNORMAL 10*3/UL
RBC # BLD AUTO: 4.22 10E12/L (ref 4.4–5.9)
RBC MORPH BLD: NORMAL
WBC # BLD AUTO: 5 10E9/L (ref 4–11)

## 2020-09-15 NOTE — RESULT ENCOUNTER NOTE
The hemoglobin a1c ( diabetes test ) is very good.  Stay on metformin.    One thyroid test ( TSH ) is a bit off but the follow up test ( free T4 ) is normal so no need for thyroid medication.    Other labs are okay/ stable    Richi Jaramillo MD

## 2020-11-02 ENCOUNTER — TELEPHONE (OUTPATIENT)
Dept: FAMILY MEDICINE | Facility: CLINIC | Age: 70
End: 2020-11-02

## 2020-11-02 DIAGNOSIS — K57.32 DIVERTICULITIS OF COLON: ICD-10-CM

## 2020-11-02 DIAGNOSIS — M79.10 MUSCLE PAIN: Primary | ICD-10-CM

## 2020-11-02 RX ORDER — CYCLOBENZAPRINE HCL 5 MG
5 TABLET ORAL 3 TIMES DAILY PRN
Qty: 20 TABLET | Refills: 0 | Status: SHIPPED | OUTPATIENT
Start: 2020-11-02 | End: 2021-08-26

## 2020-11-02 RX ORDER — TRAMADOL HYDROCHLORIDE 50 MG/1
50 TABLET ORAL EVERY 6 HOURS PRN
Qty: 20 TABLET | Refills: 0 | Status: SHIPPED | OUTPATIENT
Start: 2020-11-02 | End: 2021-02-16

## 2020-11-02 NOTE — TELEPHONE ENCOUNTER
Reason for call:  Patient reporting a symptom    Symptom or request: Back/hip muscle pain    Duration (how long have symptoms been present): Unknown    Have you been treated for this before? No    Additional comments: Patient called and stated he had considerable hip and back pain due to over lifting, and that has also affected his hernia.  Patient is requesting to inform his PCP and get a call back to discuss how to follow up.  Patient also stated he also wanted to get a pneumonia and flu shots at [facility], however, he is going to a different location since a Nurse appointment is required.    Phone Number patient can be reached at:  Home number on file [phone] (home)    Best Time:  ASAP    Can we leave a detailed message on this number:  NO    Call taken on 11/2/2020 at 1:51 PM by Mohini Thomas

## 2020-11-02 NOTE — TELEPHONE ENCOUNTER
Attempt # 1  Called patient at home number.[phone] (home)     Was call answered? Yes, wondering if Dr. Jaramillo would like to do a telephone visit with Dr. Jaramillo.   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     Wondering if Dr. Jaramillo will refill the Tramadol and also Cyclobenzaprine (not on active med list is in history).      Night sweats is very bad. Wondering if is diverticulitis? Is there a blood test to check for this?    Metformin is doing well he thinks, does not check BS.                Tika Mistry RN  [facility]

## 2020-11-03 NOTE — TELEPHONE ENCOUNTER
Okayed refills of tramadol and cyclobenzaprine  Yes phone visit early next week sounds good  Please inform patient/schedule appointment    Richi Jaramillo MD

## 2020-11-03 NOTE — TELEPHONE ENCOUNTER
Called and informed pt of message below. Pt states he will call back at a later date when he is able to look at his schedule.    Clara See SVEN Mendoza

## 2020-11-10 ENCOUNTER — VIRTUAL VISIT (OUTPATIENT)
Dept: FAMILY MEDICINE | Facility: CLINIC | Age: 70
End: 2020-11-10
Payer: COMMERCIAL

## 2020-11-10 DIAGNOSIS — M79.10 MUSCLE PAIN: ICD-10-CM

## 2020-11-10 DIAGNOSIS — R26.89 BALANCE PROBLEMS: ICD-10-CM

## 2020-11-10 DIAGNOSIS — K80.50 GALL BLADDER PAIN: Primary | ICD-10-CM

## 2020-11-10 DIAGNOSIS — F43.9 STRESS: ICD-10-CM

## 2020-11-10 DIAGNOSIS — K57.30 DIVERTICULOSIS OF LARGE INTESTINE WITHOUT HEMORRHAGE: ICD-10-CM

## 2020-11-10 PROCEDURE — 99443 PR PHYSICIAN TELEPHONE EVALUATION 21-30 MIN: CPT | Mod: 95 | Performed by: FAMILY MEDICINE

## 2020-11-10 NOTE — PROGRESS NOTES
"Gucci Logan is a 70 year old male who is being evaluated via a billable telephone visit.      The patient has been notified of following:     \"This telephone visit will be conducted via a call between you and your physician/provider. We have found that certain health care needs can be provided without the need for a physical exam.  This service lets us provide the care you need with a short phone conversation.  If a prescription is necessary we can send it directly to your pharmacy.  If lab work is needed we can place an order for that and you can then stop by our lab to have the test done at a later time.    Telephone visits are billed at different rates depending on your insurance coverage. During this emergency period, for some insurers they may be billed the same as an in-person visit.  Please reach out to your insurance provider with any questions.    If during the course of the call the physician/provider feels a telephone visit is not appropriate, you will not be charged for this service.\"    Patient has given verbal consent for Telephone visit?  Yes    What phone number would you like to be contacted at? [phone]    How would you like to obtain your AVS? Mail a copy    Subjective     Gucci Logan is a 70 year old male who presents via phone visit today for the following health issues:    HPI      Follow up on muscle strain   Diverticulitis  He states he sent an email with all his concerns           Review of Systems   Wondering about diverticulitis    nightsweats     Fevers at night    Bowels abnormal    No bloody or black stools    Not bad pain currently    Pain right side    Muscles sore    Ankle bad    Buttock muscles sore    Spine cracks some    Shoulder bad    Selling properties    Reducing stress    Balance is concerning to patient     Very busy with properties    Gall bladder    Some shortness of breath           Objective      Reviewed the last couple colonoscopy " reports        Vitals:  No vitals were obtained today due to virtual visit.    healthy, alert and no distress  PSYCH: Alert and oriented times 3; coherent speech, normal   rate and volume, able to articulate logical thoughts, able   to abstract reason, no tangential thoughts, no hallucinations   or delusions  His affect is normal  RESP: No cough, no audible wheezing, able to talk in full sentences  Remainder of exam unable to be completed due to telephone visits    Reviewed some past reports/ images            Assessment/Plan:      ASSESSMENT / PLAN:  (K80.50) Gall bladder pain  (primary encounter diagnosis)  Comment: discussed in detail   Plan: patient plans to follow up with surgeons at [facility]    (K57.30) Diverticulosis of large intestine without hemorrhage  Comment: discussed in detail   Plan: follow up prn symptoms     (M79.10) Muscle pain  Comment: encouraged patient to stay as active as possible   Plan: monitor    (F43.9) Stress   Comment: 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    Richi Jaramillo MD             Phone call duration:  30 minutes ( 9:35 to 10:05 am )    Richi Jaramillo MD

## 2020-12-07 DIAGNOSIS — E78.5 HYPERLIPIDEMIA LDL GOAL <100: ICD-10-CM

## 2020-12-08 RX ORDER — ATORVASTATIN CALCIUM 10 MG/1
TABLET, FILM COATED ORAL
Qty: 90 TABLET | Refills: 1 | Status: SHIPPED | OUTPATIENT
Start: 2020-12-08 | End: 2021-05-21

## 2020-12-17 ENCOUNTER — TELEPHONE (OUTPATIENT)
Dept: FAMILY MEDICINE | Facility: CLINIC | Age: 70
End: 2020-12-17

## 2020-12-17 NOTE — TELEPHONE ENCOUNTER
Reason for Call:  Other prescription    Detailed comments: Pt states he has chronic pain and he needs his med refill. It's the 500 mg tablet he takes. He uses the [facility] in [place]. Please send med there.    Phone Number Patient can be reached at: Cell number on file:    Telephone Information:   Mobile [phone]       Best Time: Anytime    Can we leave a detailed message on this number? YES    Call taken on 12/17/2020 at 4:15 PM by Xin Tobias

## 2020-12-18 ENCOUNTER — TRANSFERRED RECORDS (OUTPATIENT)
Dept: HEALTH INFORMATION MANAGEMENT | Facility: CLINIC | Age: 70
End: 2020-12-18

## 2020-12-18 ENCOUNTER — TELEPHONE (OUTPATIENT)
Dept: FAMILY MEDICINE | Facility: CLINIC | Age: 70
End: 2020-12-18

## 2020-12-18 DIAGNOSIS — J01.90 ACUTE SINUSITIS WITH SYMPTOMS > 10 DAYS: Primary | ICD-10-CM

## 2020-12-18 NOTE — TELEPHONE ENCOUNTER
Patient would not cooperate with taking the message.    Let him know the limitations of my job.     He was offered an appointment with Dr. Jaramillo.    He went on to complain about being on hold and not hearing back from the clinic about his needs.     The message that was left by the patient was read to him over the phone. He was unaware of what that was about.    He is upset about not hearing back about his message about his sinus infection.     He is expecting medications sent over to his preferred pharmacy [facility] [address] [place] [address], [place]    He said if he didn't get a prescription today for his sinus infection from Dr. Jaramillo that he will need to go to another doctor.    Let him know I could take a message. He didn't like what I could do for him and went on about the phone system not work.     He was transferred to [employer] voicemail & email was sent to supervisors.     Crystal Camejo,

## 2020-12-22 NOTE — TELEPHONE ENCOUNTER
I called and discussed in detail  [facility]  Sent in refill so he would have it on hand if needed    Discussed gall bladder also  Follow up prn symptoms  Richi Jaramillo MD

## 2021-01-04 ENCOUNTER — TELEPHONE (OUTPATIENT)
Dept: FAMILY MEDICINE | Facility: CLINIC | Age: 71
End: 2021-01-04

## 2021-01-04 DIAGNOSIS — J01.90 ACUTE SINUSITIS WITH SYMPTOMS > 10 DAYS: Primary | ICD-10-CM

## 2021-01-04 DIAGNOSIS — E78.5 HYPERLIPIDEMIA LDL GOAL <100: ICD-10-CM

## 2021-01-04 DIAGNOSIS — Z12.5 SCREENING FOR PROSTATE CANCER: ICD-10-CM

## 2021-01-04 DIAGNOSIS — R53.83 FATIGUE, UNSPECIFIED TYPE: ICD-10-CM

## 2021-01-04 DIAGNOSIS — E11.43 TYPE 2 DIABETES MELLITUS WITH DIABETIC AUTONOMIC NEUROPATHY, WITHOUT LONG-TERM CURRENT USE OF INSULIN (H): ICD-10-CM

## 2021-01-05 RX ORDER — CEFDINIR 300 MG/1
300 CAPSULE ORAL 2 TIMES DAILY
Qty: 20 CAPSULE | Refills: 0 | Status: SHIPPED | OUTPATIENT
Start: 2021-01-05 | End: 2021-01-13

## 2021-01-05 NOTE — TELEPHONE ENCOUNTER
I called and spoke to patient.    Advised him of new antibiotic sent.   I also advised him to check with dentist regarding the implant pain, he says he is scheduled to see dentist in a week.    He then decided he'd like to schedule a phone visit with Dr. Jaramillo to discuss his orthopedic/knee issues and his thoughts about having gallbladder surgery or not.   Also plans to let Dr. Jaramillo know how the sinus infection responded to the new antibiotic.    He wonders if Dr. Jaramillo would like to do an labs before the phone visit?   Gucci is thinking he might need to do liver function and A1C.    Routed to Dr. Jaramillo to advise on possible lab only orders.    Connie Garza RN  [facility]

## 2021-01-05 NOTE — TELEPHONE ENCOUNTER
Patient requesting different antibiotics for sinus pain. Also had dental implants put in 12/29, pain worsening.    Thank you,    Katie Mitchell, Central Scheduler

## 2021-01-05 NOTE — TELEPHONE ENCOUNTER
See previous calls, patient was seen by [facility] for sinus infection, I see Dr. Jaramillo sent augmentin on 12/22/20 for patient as well.  See patient note, appears is having ongoing sinus issue and pain from dental implants (could be sinus issue too?).    Assume he will need to be seen?  Patient previously not open to a visit and dislikes discussing issues with staff, routed to Dr. Jaramillo to advise if he wants to see patient in clinic or perhaps virtual visit?      Connie Garza RN  [facility]

## 2021-01-05 NOTE — TELEPHONE ENCOUNTER
I see Dr. Jaramillo sent Rx for omnicef for sinus infection.    Attempted to call patient at home/mobile (only number) listed in Epic.   No answer, no voicemail set up so unable to leave a message.    Will need to try later.    Assume he will need to follow up with dentist regarding pain with implants (see first message).    Connie Garza RN  [facility]

## 2021-01-05 NOTE — TELEPHONE ENCOUNTER
I sent in an alternative antibiotic but then if symptoms not resolving advise in clinic visit    Please inform patient    Richi Jaramillo MD

## 2021-01-06 NOTE — TELEPHONE ENCOUNTER
I put in future lab orders    Patient can do a fasting lab appointment    Please inform patient    Richi Jaramillo MD

## 2021-01-07 NOTE — TELEPHONE ENCOUNTER
I called patient and was unable to leave voicemail since mailbox is not set up yet.  Sofia Conde CMA

## 2021-01-12 DIAGNOSIS — Z12.5 SCREENING FOR PROSTATE CANCER: ICD-10-CM

## 2021-01-12 DIAGNOSIS — E78.5 HYPERLIPIDEMIA LDL GOAL <100: ICD-10-CM

## 2021-01-12 DIAGNOSIS — E11.43 TYPE 2 DIABETES MELLITUS WITH DIABETIC AUTONOMIC NEUROPATHY, WITHOUT LONG-TERM CURRENT USE OF INSULIN (H): ICD-10-CM

## 2021-01-12 DIAGNOSIS — R53.83 FATIGUE, UNSPECIFIED TYPE: ICD-10-CM

## 2021-01-12 LAB
ALBUMIN SERPL-MCNC: 3.7 G/DL (ref 3.4–5)
ALP SERPL-CCNC: 81 U/L (ref 40–150)
ALT SERPL W P-5'-P-CCNC: 76 U/L (ref 0–70)
ANION GAP SERPL CALCULATED.3IONS-SCNC: 5 MMOL/L (ref 3–14)
AST SERPL W P-5'-P-CCNC: 42 U/L (ref 0–45)
BASOPHILS # BLD AUTO: 0 10E9/L (ref 0–0.2)
BASOPHILS NFR BLD AUTO: 0.2 %
BILIRUB SERPL-MCNC: 1.3 MG/DL (ref 0.2–1.3)
BUN SERPL-MCNC: 23 MG/DL (ref 7–30)
CALCIUM SERPL-MCNC: 9.2 MG/DL (ref 8.5–10.1)
CHLORIDE SERPL-SCNC: 110 MMOL/L (ref 94–109)
CHOLEST SERPL-MCNC: 178 MG/DL
CO2 SERPL-SCNC: 26 MMOL/L (ref 20–32)
CREAT SERPL-MCNC: 0.97 MG/DL (ref 0.66–1.25)
DIFFERENTIAL METHOD BLD: ABNORMAL
EOSINOPHIL # BLD AUTO: 0.1 10E9/L (ref 0–0.7)
EOSINOPHIL NFR BLD AUTO: 2 %
ERYTHROCYTE [DISTWIDTH] IN BLOOD BY AUTOMATED COUNT: 13.7 % (ref 10–15)
GFR SERPL CREATININE-BSD FRML MDRD: 79 ML/MIN/{1.73_M2}
GLUCOSE SERPL-MCNC: 195 MG/DL (ref 70–99)
HBA1C MFR BLD: 7.5 % (ref 0–5.6)
HCT VFR BLD AUTO: 42.7 % (ref 40–53)
HDLC SERPL-MCNC: 60 MG/DL
HGB BLD-MCNC: 14 G/DL (ref 13.3–17.7)
LDLC SERPL CALC-MCNC: 86 MG/DL
LYMPHOCYTES # BLD AUTO: 0.7 10E9/L (ref 0.8–5.3)
LYMPHOCYTES NFR BLD AUTO: 15.2 %
MCH RBC QN AUTO: 32.1 PG (ref 26.5–33)
MCHC RBC AUTO-ENTMCNC: 32.8 G/DL (ref 31.5–36.5)
MCV RBC AUTO: 98 FL (ref 78–100)
MONOCYTES # BLD AUTO: 0.5 10E9/L (ref 0–1.3)
MONOCYTES NFR BLD AUTO: 10 %
NEUTROPHILS # BLD AUTO: 3.3 10E9/L (ref 1.6–8.3)
NEUTROPHILS NFR BLD AUTO: 72.6 %
NONHDLC SERPL-MCNC: 118 MG/DL
PLATELET # BLD AUTO: 59 10E9/L (ref 150–450)
POTASSIUM SERPL-SCNC: 4.3 MMOL/L (ref 3.4–5.3)
PROT SERPL-MCNC: 7.4 G/DL (ref 6.8–8.8)
PSA SERPL-ACNC: 1.81 UG/L (ref 0–4)
RBC # BLD AUTO: 4.36 10E12/L (ref 4.4–5.9)
SODIUM SERPL-SCNC: 141 MMOL/L (ref 133–144)
T4 FREE SERPL-MCNC: 0.86 NG/DL (ref 0.76–1.46)
TRIGL SERPL-MCNC: 160 MG/DL
TSH SERPL DL<=0.005 MIU/L-ACNC: 8.09 MU/L (ref 0.4–4)
WBC # BLD AUTO: 4.6 10E9/L (ref 4–11)

## 2021-01-12 PROCEDURE — 85025 COMPLETE CBC W/AUTO DIFF WBC: CPT | Performed by: FAMILY MEDICINE

## 2021-01-12 PROCEDURE — G0103 PSA SCREENING: HCPCS | Performed by: FAMILY MEDICINE

## 2021-01-12 PROCEDURE — 84443 ASSAY THYROID STIM HORMONE: CPT | Performed by: FAMILY MEDICINE

## 2021-01-12 PROCEDURE — 36415 COLL VENOUS BLD VENIPUNCTURE: CPT | Performed by: FAMILY MEDICINE

## 2021-01-12 PROCEDURE — 83036 HEMOGLOBIN GLYCOSYLATED A1C: CPT | Performed by: FAMILY MEDICINE

## 2021-01-12 PROCEDURE — 80053 COMPREHEN METABOLIC PANEL: CPT | Performed by: FAMILY MEDICINE

## 2021-01-12 PROCEDURE — 80061 LIPID PANEL: CPT | Performed by: FAMILY MEDICINE

## 2021-01-12 PROCEDURE — 84439 ASSAY OF FREE THYROXINE: CPT | Performed by: FAMILY MEDICINE

## 2021-01-12 NOTE — LETTER
January 13, 2021      Gucci Logan  [address] [place] [id number]        Dear ,    We are writing to inform you of your test results.    Here are the lab results we discussed in detail       Resulted Orders   Prostate spec antigen screen   Result Value Ref Range    PSA 1.81 0 - 4 ug/L      Comment:      Assay Method:  Chemiluminescence using Siemens Vista analyzer   CBC with platelets differential   Result Value Ref Range    WBC 4.6 4.0 - 11.0 10e9/L    RBC Count 4.36 (L) 4.4 - 5.9 10e12/L    Hemoglobin 14.0 13.3 - 17.7 g/dL    Hematocrit 42.7 40.0 - 53.0 %    MCV 98 78 - 100 fl    MCH 32.1 26.5 - 33.0 pg    MCHC 32.8 31.5 - 36.5 g/dL    RDW 13.7 10.0 - 15.0 %    Platelet Count 59 (L) 150 - 450 10e9/L      Comment:      Verified by smear review    Diff Method Automated Method     % Neutrophils 72.6 %    % Lymphocytes 15.2 %    % Monocytes 10.0 %    % Eosinophils 2.0 %    % Basophils 0.2 %    Absolute Neutrophil 3.3 1.6 - 8.3 10e9/L    Absolute Lymphocytes 0.7 (L) 0.8 - 5.3 10e9/L    Absolute Monocytes 0.5 0.0 - 1.3 10e9/L    Absolute Eosinophils 0.1 0.0 - 0.7 10e9/L    Absolute Basophils 0.0 0.0 - 0.2 10e9/L   Hemoglobin A1c   Result Value Ref Range    Hemoglobin A1C 7.5 (H) 0 - 5.6 %      Comment:      Normal <5.7% Prediabetes 5.7-6.4%  Diabetes 6.5% or higher - adopted from ADA   consensus guidelines.     TSH with free T4 reflex   Result Value Ref Range    TSH 8.09 (H) 0.40 - 4.00 mU/L   Comprehensive metabolic panel   Result Value Ref Range    Sodium 141 133 - 144 mmol/L    Potassium 4.3 3.4 - 5.3 mmol/L    Chloride 110 (H) 94 - 109 mmol/L    Carbon Dioxide 26 20 - 32 mmol/L    Anion Gap 5 3 - 14 mmol/L    Glucose 195 (H) 70 - 99 mg/dL      Comment:      Fasting specimen    Urea Nitrogen 23 7 - 30 mg/dL    Creatinine 0.97 0.66 - 1.25 mg/dL    GFR Estimate 79 >60 mL/min/[1.73_m2]      Comment:      Non  GFR Calc  Starting 12/18/2018, serum creatinine based estimated  GFR (eGFR) will be   calculated using the Chronic Kidney Disease Epidemiology Collaboration   (CKD-EPI) equation.      GFR Estimate If Black >90 >60 mL/min/[1.73_m2]      Comment:       GFR Calc  Starting 12/18/2018, serum creatinine based estimated GFR (eGFR) will be   calculated using the Chronic Kidney Disease Epidemiology Collaboration   (CKD-EPI) equation.      Calcium 9.2 8.5 - 10.1 mg/dL    Bilirubin Total 1.3 0.2 - 1.3 mg/dL    Albumin 3.7 3.4 - 5.0 g/dL    Protein Total 7.4 6.8 - 8.8 g/dL    Alkaline Phosphatase 81 40 - 150 U/L    ALT 76 (H) 0 - 70 U/L    AST 42 0 - 45 U/L   Lipid panel reflex to direct LDL Fasting   Result Value Ref Range    Cholesterol 178 <200 mg/dL    Triglycerides 160 (H) <150 mg/dL      Comment:      Borderline high:  150-199 mg/dl  High:             200-499 mg/dl  Very high:       >499 mg/dl  Fasting specimen      HDL Cholesterol 60 >39 mg/dL    LDL Cholesterol Calculated 86 <100 mg/dL      Comment:      Desirable:       <100 mg/dl    Non HDL Cholesterol 118 <130 mg/dL   T4 free   Result Value Ref Range    T4 Free 0.86 0.76 - 1.46 ng/dL       If you have any questions or concerns, please call the clinic at the number listed above.       Sincerely,      Richi Jaramillo MD/alvina

## 2021-01-13 ENCOUNTER — VIRTUAL VISIT (OUTPATIENT)
Dept: FAMILY MEDICINE | Facility: CLINIC | Age: 71
End: 2021-01-13
Payer: COMMERCIAL

## 2021-01-13 DIAGNOSIS — D69.6 THROMBOCYTOPENIA (H): ICD-10-CM

## 2021-01-13 DIAGNOSIS — E03.9 HYPOTHYROIDISM, UNSPECIFIED TYPE: Primary | ICD-10-CM

## 2021-01-13 DIAGNOSIS — J01.90 ACUTE SINUSITIS WITH SYMPTOMS > 10 DAYS: ICD-10-CM

## 2021-01-13 PROCEDURE — 99214 OFFICE O/P EST MOD 30 MIN: CPT | Mod: 95 | Performed by: FAMILY MEDICINE

## 2021-01-13 RX ORDER — LEVOTHYROXINE SODIUM 25 UG/1
25 TABLET ORAL DAILY
Qty: 30 TABLET | Refills: 2 | Status: SHIPPED | OUTPATIENT
Start: 2021-01-13 | End: 2021-03-29

## 2021-01-13 RX ORDER — CEFDINIR 300 MG/1
300 CAPSULE ORAL 2 TIMES DAILY
Qty: 20 CAPSULE | Refills: 0 | Status: SHIPPED | OUTPATIENT
Start: 2021-01-13 | End: 2021-04-14

## 2021-01-13 NOTE — PROGRESS NOTES
Canelo is a 70 year old who is being evaluated via a billable telephone visit.      What phone number would you like to be contacted at? [phone]  How would you like to obtain your AVS? Mail a copy       Subjective     Canelo is a 70 year old who presents to clinic today for the following health issues     HPI       Follow up on ortho,gallbladder and sinus probem  Patient's weight is 169       Review of Systems    sinus symptoms just there    No bleeding    Has appointment coming up with liver specialist    Greasy foods problematic sometimes    Bowels working too good    Still with nightsweats, bad    Dental issues continue          Objective           Vitals:  No vitals were obtained today due to virtual visit.    Physical Exam   healthy, alert and no distress  PSYCH: Alert and oriented times 3; coherent speech, normal   rate and volume, able to articulate logical thoughts, able   to abstract reason, no tangential thoughts, no hallucinations   or delusions  His affect is normal  RESP: No cough, no audible wheezing, able to talk in full sentences  Remainder of exam unable to be completed due to telephone visits       Went over recent labs in great detail    ASSESSMENT / PLAN:  (E03.9) Hypothyroidism, unspecified type  (primary encounter diagnosis)  Comment: free t4 at low end of normal and patient with fatigue/ wt issues.  Thus reasonable to use low  Dose thyroid medication, then recheck in a couple months  Plan: levothyroxine (SYNTHROID/LEVOTHROID) 25 MCG         tablet, TSH, T4 free        Patient will do fasting lab appointment then ideally see me a couple days later    (J01.90) Acute sinusitis with symptoms > 10 days  Comment: extend antibiotics,monitor symptoms   Plan: cefdinir (OMNICEF) 300 MG capsule             (D69.6) Thrombocytopenia (H)  Comment: advised patient see hematology but he declined  Plan: CBC with platelets differential         Monitor lab;  He has no symptoms.  Of note he is seeing liver  specialist soon.       I reviewed the patient's medications, allergies, medical history, family history, and social history.    Richi Jaramillo MD              Phone call duration: 23 minutes ( 10:06 to 10:29 am )    Richi Jaramillo MD

## 2021-01-13 NOTE — TELEPHONE ENCOUNTER
Patient had phone visit with Dr. Jaramillo today and had his lab draw yesterday.  Sofia Conde CMA

## 2021-01-27 ENCOUNTER — TELEPHONE (OUTPATIENT)
Dept: GASTROENTEROLOGY | Facility: CLINIC | Age: 71
End: 2021-01-27

## 2021-01-27 NOTE — TELEPHONE ENCOUNTER
Connected with patient regarding labs needed. The only lab still needed would be the INR. Writer put in lab appointment note to only draw INR. Pt agreeable to plan.    Ava SNELL LPN  [facility]

## 2021-01-27 NOTE — TELEPHONE ENCOUNTER
----- Message from Kylee Noonan LPN sent at 1/27/2021  9:57 AM CST -----  Regarding: FW: Patient request if the cbc and ccomp test is neccessary?    ----- Message -----  From: Jason Means  Sent: 1/27/2021   9:49 AM CST  To: Mata Renteria MD, [facility]  Subject: Patient request if the cbc and ccomp test is#    Hello Dr. Patient SWATHI PEREIRA [[id number]] called a lab today and asking there is similar test done on January 12  and He was wondering if its necessary to do CBC and CCOMP test again. He has a concern about insurance might not cover it. He is coming to lab tomorrow and Please let me know if its not necessary to do it again.     Thanks

## 2021-01-28 ENCOUNTER — ANCILLARY PROCEDURE (OUTPATIENT)
Dept: ULTRASOUND IMAGING | Facility: CLINIC | Age: 71
End: 2021-01-28
Attending: INTERNAL MEDICINE
Payer: COMMERCIAL

## 2021-01-28 DIAGNOSIS — K74.69 OTHER CIRRHOSIS OF LIVER (H): ICD-10-CM

## 2021-01-28 DIAGNOSIS — I81 PORTAL VEIN THROMBOSIS: ICD-10-CM

## 2021-01-28 DIAGNOSIS — D69.6 THROMBOCYTOPENIA (H): ICD-10-CM

## 2021-01-28 LAB — INR PPP: 1.14 (ref 0.86–1.14)

## 2021-01-28 PROCEDURE — 36416 COLLJ CAPILLARY BLOOD SPEC: CPT | Performed by: PATHOLOGY

## 2021-01-28 PROCEDURE — 93975 VASCULAR STUDY: CPT | Mod: GC | Performed by: RADIOLOGY

## 2021-01-28 PROCEDURE — 85610 PROTHROMBIN TIME: CPT | Performed by: PATHOLOGY

## 2021-02-04 ENCOUNTER — OFFICE VISIT (OUTPATIENT)
Dept: GASTROENTEROLOGY | Facility: CLINIC | Age: 71
End: 2021-02-04
Attending: INTERNAL MEDICINE
Payer: COMMERCIAL

## 2021-02-04 VITALS
WEIGHT: 274.1 LBS | HEART RATE: 68 BPM | SYSTOLIC BLOOD PRESSURE: 147 MMHG | OXYGEN SATURATION: 95 % | TEMPERATURE: 97.4 F | DIASTOLIC BLOOD PRESSURE: 66 MMHG | BODY MASS INDEX: 38.23 KG/M2

## 2021-02-04 DIAGNOSIS — K74.69 OTHER CIRRHOSIS OF LIVER (H): Primary | ICD-10-CM

## 2021-02-04 PROCEDURE — 99214 OFFICE O/P EST MOD 30 MIN: CPT | Mod: GC | Performed by: HOSPITALIST

## 2021-02-04 ASSESSMENT — PAIN SCALES - GENERAL: PAINLEVEL: NO PAIN (0)

## 2021-02-04 NOTE — PROGRESS NOTES
GI CLINIC VISIT    CC/REFERRING PROVIDER:  Physician No Ref-Primary  REASON FOR CONSULTATION: Cirrhosis    HPI: 70 year old male with cirrhosis  This patient was last seen 8/2020      Diagnosed: liver biopsy 05/2008.  Etiology: HCV [diagnosed 2004, status post SVR], possible component of nonalcoholic fatty liver disease given presence of central obesity, hypertension, diabetes  MELD-Na of  9 (<2% 90-day mortality), Child-Guy-Jay score of 6 (Class A)   Hepatic encephalopathy: None  Ascites: None  SBP prophylaxis: Not indicated  TIPS: None  Esophageal/Gastric varices: Last EGD was done 4/2019 with small varices  Hepatocellular carcinoma: Last USS was done 1/2021 with no HCC    Transplant: Low MELD    Patient has well compensated cirrhosis.  He had an ultrasound that showed portal venous thrombosis that was nonocclusive.  He was not started on any treatment for this.  Since then, repeat ultrasound has shown absence of thrombosis even though study was suboptimal.  Of note, the ultrasound did note a 0.7 cm lesion that was indeterminate.  Patient continues to complain of right upper quadrant pain rated to be symptomatic gallstone disease.  He has not yet seen a surgeon.  He denies any nausea or vomiting, fevers or chills, confusion, melena, hematochezia.     ROS: 10pt ROS performed and otherwise negative.    PERTINENT PAST MEDICAL/SURGICAL HISTORY:  Past Medical History:   Diagnosis Date     Arthritis      Esophageal reflux 3/1/2014     Hearing problem      Hiatal hernia      Hypertension      Liver disease      Obesity 1/24/2013     Obstructive sleep apnea      Sleep apnea     Advised CPAP machine. Not keen to use it.       PERTINENT MEDICATIONS:  Current Outpatient Medications:      alcohol swab prep pads, Use to swab area of injection/jose antonio as directed., Disp: 100 each, Rfl: 3     atorvastatin (LIPITOR) 10 MG tablet, Take 1 tablet by mouth once daily, Disp: 90 tablet, Rfl: 1     blood glucose (NO BRAND SPECIFIED)  test strip, Use to test blood sugar 1 times daily or as directed. To accompany: Blood Glucose Monitor Brands: per insurance., Disp: 100 strip, Rfl: 6     blood glucose monitoring (NO BRAND SPECIFIED) meter device kit, Use to test blood sugar 1 times daily or as directed. Preferred blood glucose meter OR supplies to accompany: Blood Glucose Monitor Brands: per insurance., Disp: 1 kit, Rfl: 0     cefdinir (OMNICEF) 300 MG capsule, Take 1 capsule (300 mg) by mouth 2 times daily, Disp: 20 capsule, Rfl: 0     cholecalciferol 25 MCG (1000 UT) TABS, Take 1,000 Units by mouth daily, Disp: , Rfl:      cyclobenzaprine (FLEXERIL) 5 MG tablet, Take 1 tablet (5 mg) by mouth 3 times daily as needed for muscle spasms, Disp: 20 tablet, Rfl: 0     glutamine 500 MG capsule, Take 500 mg by mouth daily, Disp: , Rfl:      hydrochlorothiazide (HYDRODIURIL) 50 MG tablet, Take 1 tablet (50 mg) by mouth daily, Disp: 90 tablet, Rfl: 1     lactobacillus rhamnosus, GG, (CULTURELL) capsule, Take 1 capsule by mouth 2 times daily, Disp: , Rfl:      levothyroxine (SYNTHROID/LEVOTHROID) 25 MCG tablet, Take 1 tablet (25 mcg) by mouth daily, Disp: 30 tablet, Rfl: 2     losartan (COZAAR) 100 MG tablet, Take 1 tablet (100 mg) by mouth daily, Disp: 90 tablet, Rfl: 1     metFORMIN (GLUCOPHAGE) 500 MG tablet, TAKE 1 TABLET BY MOUTH TWICE DAILY EACH TIME WITH A MEAL, Disp: 180 tablet, Rfl: 1     metoprolol succinate ER (TOPROL-XL) 25 MG 24 hr tablet, 75 mg ( 3 tabs ) po daily, Disp: 270 tablet, Rfl: 1     Multiple Vitamins-Minerals (MULTIVITAMIN ADULT PO), Take 1 tablet by mouth daily , Disp: , Rfl:      omeprazole (PRILOSEC) 20 MG DR capsule, TAKE 1 CAPSULE BY MOUTH ONCE DAILY 30 TO 60 MINUTES BEFORE A MEAL, Disp: 90 capsule, Rfl: 1     ondansetron (ZOFRAN-ODT) 4 MG ODT tab, Take 1 tablet (4 mg) by mouth every 6 hours as needed for nausea or vomiting, Disp: 10 tablet, Rfl: 0     spironolactone (ALDACTONE) 25 MG tablet, Take 1 tablet (25 mg) by mouth  daily, Disp: 90 tablet, Rfl: 1     thin (NO BRAND SPECIFIED) lancets, Use with lanceting device. To accompany: Blood Glucose Monitor Brands: per insurance., Disp: 100 each, Rfl: 3     traMADol (ULTRAM) 50 MG tablet, Take 1 tablet (50 mg) by mouth every 6 hours as needed for moderate pain, Disp: 20 tablet, Rfl: 0    PHYSICAL EXAMINATION:  Vitals reviewed  BP (!) 147/66   Pulse 68   Temp 97.4  F (36.3  C)   Wt 124.3 kg (274 lb 1.6 oz)   SpO2 95%   BMI 38.23 kg/m    Wt Readings from Last 2 Encounters:   02/04/21 124.3 kg (274 lb 1.6 oz)   09/11/20 119 kg (262 lb 4 oz)       Gen: aaox3, cooperative, pleasant,    HEENT: ncat, neck supple, no clad/scla   Resp/CV without acute findings, not dyspneic/tachycardic  Abd: Obese, mild RUQ tenderness  Ext: no c/c/e  Skin: warm, perfused, no jaundice  Neuro: grossly intact, no asterixis noted    PERTINENT STUDIES:       Lab Results   Component Value Date     01/12/2021    Lab Results   Component Value Date    CHLORIDE 110 01/12/2021    Lab Results   Component Value Date    BUN 23 01/12/2021      Lab Results   Component Value Date    POTASSIUM 4.3 01/12/2021    Lab Results   Component Value Date    CO2 26 01/12/2021    Lab Results   Component Value Date    CR 0.97 01/12/2021        Lab Results   Component Value Date    WBC 4.6 01/12/2021    HGB 14.0 01/12/2021    HCT 42.7 01/12/2021    MCV 98 01/12/2021    PLT 59 (L) 01/12/2021     Lab Results   Component Value Date    AST 42 01/12/2021    ALT 76 (H) 01/12/2021    ALKPHOS 81 01/12/2021    BILITOTAL 1.3 01/12/2021    BILICONJ 0.0 07/11/2008     Lab Results   Component Value Date    INR 1.14 01/28/2021     MELD-Na 9     ASSESSMENT/PLAN:  70-year-old male with a  compensated cirrhosis seen in clinic for follow-up of his cirrhosis      1. Portal venous thrombosis       Compensated cirrhosis  Patient had newly diagnosed portal venous thrombosis with extension into the mesenteric vein, however this clot was nonocclusive.  Repeat USG was negative. His cirrhosis is well compensated, and he has no evidence of varices, encephalopathy, or ascites.  He has MELD score of 9. He is UTD with variceal and HCC surveillance.    We will obtain an MRI to further evaluate the liver lesion and his portal vein as USG was not optimal     2.  Symptomatic gallstones  We will refer to see Dr. Rowe. Patient is low risk from a liver disease standpoint given that he is CTP class A, and his MELD is 9.        RECOMMENDATIONS:  -- Obtain MRI liver  -- Surgery consult   -- Ensure sodium restriction to 2000 mg per day  -- Adequate protein diet (1.2 - 1.5g/kg/day)                  - Recommend multiple meals during the day, Snacks and shakes during the day/night                  - Can use Ensure/Boost drinks TID                  - Avoid raw shellfish and oysters (risk of Vibrio vulnificus infection)        Colorectal Cancer Screening  Last 4/2019, repeat in 5 years     RTC 6 months, sooner if symptomatic.       The visit lasted up to 25 minutes, with more than half of the time spent on counseling and education.  All questions were answered to patient's satisfaction     Patient seen and discussed with staff GI physician, Dr. Renteria, who agrees with my assessment and plan.      Sunil Briggs MD  Transplant Hepatology fellow  PGY 6  [phone]

## 2021-02-04 NOTE — LETTER
2/4/2021         RE: Gucci Logan  [address]  [facility] [address]        Dear Colleague,    Thank you for referring your patient, Gucci Logan, to the [facility] [place]. Please see a copy of my visit note below.    GI CLINIC VISIT    CC/REFERRING PROVIDER:  Physician No Ref-Primary  REASON FOR CONSULTATION: Cirrhosis    HPI: 70 year old male with cirrhosis  This patient was last seen 8/2020      Diagnosed: liver biopsy 05/2008.  Etiology: HCV [diagnosed 2004, status post SVR], possible component of nonalcoholic fatty liver disease given presence of central obesity, hypertension, diabetes  MELD-Na of  9 (<2% 90-day mortality), Child-Guy-Jay score of 6 (Class A)   Hepatic encephalopathy: None  Ascites: None  SBP prophylaxis: Not indicated  TIPS: None  Esophageal/Gastric varices: Last EGD was done 4/2019 with small varices  Hepatocellular carcinoma: Last USS was done 1/2021 with no HCC    Transplant: Low MELD    Patient has well compensated cirrhosis.  He had an ultrasound that showed portal venous thrombosis that was nonocclusive.  He was not started on any treatment for this.  Since then, repeat ultrasound has shown absence of thrombosis even though study was suboptimal.  Of note, the ultrasound did note a 0.7 cm lesion that was indeterminate.  Patient continues to complain of right upper quadrant pain rated to be symptomatic gallstone disease.  He has not yet seen a surgeon.  He denies any nausea or vomiting, fevers or chills, confusion, melena, hematochezia.     ROS: 10pt ROS performed and otherwise negative.    PERTINENT PAST MEDICAL/SURGICAL HISTORY:  Past Medical History:   Diagnosis Date     Arthritis      Esophageal reflux 3/1/2014     Hearing problem      Hiatal hernia      Hypertension      Liver disease      Obesity 1/24/2013     Obstructive sleep apnea      Sleep apnea     Advised CPAP machine. Not keen to use it.       PERTINENT  MEDICATIONS:  Current Outpatient Medications:      alcohol swab prep pads, Use to swab area of injection/jose antonio as directed., Disp: 100 each, Rfl: 3     atorvastatin (LIPITOR) 10 MG tablet, Take 1 tablet by mouth once daily, Disp: 90 tablet, Rfl: 1     blood glucose (NO BRAND SPECIFIED) test strip, Use to test blood sugar 1 times daily or as directed. To accompany: Blood Glucose Monitor Brands: per insurance., Disp: 100 strip, Rfl: 6     blood glucose monitoring (NO BRAND SPECIFIED) meter device kit, Use to test blood sugar 1 times daily or as directed. Preferred blood glucose meter OR supplies to accompany: Blood Glucose Monitor Brands: per insurance., Disp: 1 kit, Rfl: 0     cefdinir (OMNICEF) 300 MG capsule, Take 1 capsule (300 mg) by mouth 2 times daily, Disp: 20 capsule, Rfl: 0     cholecalciferol 25 MCG (1000 UT) TABS, Take 1,000 Units by mouth daily, Disp: , Rfl:      cyclobenzaprine (FLEXERIL) 5 MG tablet, Take 1 tablet (5 mg) by mouth 3 times daily as needed for muscle spasms, Disp: 20 tablet, Rfl: 0     glutamine 500 MG capsule, Take 500 mg by mouth daily, Disp: , Rfl:      hydrochlorothiazide (HYDRODIURIL) 50 MG tablet, Take 1 tablet (50 mg) by mouth daily, Disp: 90 tablet, Rfl: 1     lactobacillus rhamnosus, GG, (CULTURELL) capsule, Take 1 capsule by mouth 2 times daily, Disp: , Rfl:      levothyroxine (SYNTHROID/LEVOTHROID) 25 MCG tablet, Take 1 tablet (25 mcg) by mouth daily, Disp: 30 tablet, Rfl: 2     losartan (COZAAR) 100 MG tablet, Take 1 tablet (100 mg) by mouth daily, Disp: 90 tablet, Rfl: 1     metFORMIN (GLUCOPHAGE) 500 MG tablet, TAKE 1 TABLET BY MOUTH TWICE DAILY EACH TIME WITH A MEAL, Disp: 180 tablet, Rfl: 1     metoprolol succinate ER (TOPROL-XL) 25 MG 24 hr tablet, 75 mg ( 3 tabs ) po daily, Disp: 270 tablet, Rfl: 1     Multiple Vitamins-Minerals (MULTIVITAMIN ADULT PO), Take 1 tablet by mouth daily , Disp: , Rfl:      omeprazole (PRILOSEC) 20 MG DR capsule, TAKE 1 CAPSULE BY MOUTH ONCE  DAILY 30 TO 60 MINUTES BEFORE A MEAL, Disp: 90 capsule, Rfl: 1     ondansetron (ZOFRAN-ODT) 4 MG ODT tab, Take 1 tablet (4 mg) by mouth every 6 hours as needed for nausea or vomiting, Disp: 10 tablet, Rfl: 0     spironolactone (ALDACTONE) 25 MG tablet, Take 1 tablet (25 mg) by mouth daily, Disp: 90 tablet, Rfl: 1     thin (NO BRAND SPECIFIED) lancets, Use with lanceting device. To accompany: Blood Glucose Monitor Brands: per insurance., Disp: 100 each, Rfl: 3     traMADol (ULTRAM) 50 MG tablet, Take 1 tablet (50 mg) by mouth every 6 hours as needed for moderate pain, Disp: 20 tablet, Rfl: 0    PHYSICAL EXAMINATION:  Vitals reviewed  BP (!) 147/66   Pulse 68   Temp 97.4  F (36.3  C)   Wt 124.3 kg (274 lb 1.6 oz)   SpO2 95%   BMI 38.23 kg/m    Wt Readings from Last 2 Encounters:   02/04/21 124.3 kg (274 lb 1.6 oz)   09/11/20 119 kg (262 lb 4 oz)       Gen: aaox3, cooperative, pleasant,    HEENT: ncat, neck supple, no clad/scla   Resp/CV without acute findings, not dyspneic/tachycardic  Abd: Obese, mild RUQ tenderness  Ext: no c/c/e  Skin: warm, perfused, no jaundice  Neuro: grossly intact, no asterixis noted    PERTINENT STUDIES:       Lab Results   Component Value Date     01/12/2021    Lab Results   Component Value Date    CHLORIDE 110 01/12/2021    Lab Results   Component Value Date    BUN 23 01/12/2021      Lab Results   Component Value Date    POTASSIUM 4.3 01/12/2021    Lab Results   Component Value Date    CO2 26 01/12/2021    Lab Results   Component Value Date    CR 0.97 01/12/2021        Lab Results   Component Value Date    WBC 4.6 01/12/2021    HGB 14.0 01/12/2021    HCT 42.7 01/12/2021    MCV 98 01/12/2021    PLT 59 (L) 01/12/2021     Lab Results   Component Value Date    AST 42 01/12/2021    ALT 76 (H) 01/12/2021    ALKPHOS 81 01/12/2021    BILITOTAL 1.3 01/12/2021    BILICONJ 0.0 07/11/2008     Lab Results   Component Value Date    INR 1.14 01/28/2021     MELD-Na 9      ASSESSMENT/PLAN:  70-year-old male with a  compensated cirrhosis seen in clinic for follow-up of his cirrhosis      1. Portal venous thrombosis       Compensated cirrhosis  Patient had newly diagnosed portal venous thrombosis with extension into the mesenteric vein, however this clot was nonocclusive. Repeat USG was negative. His cirrhosis is well compensated, and he has no evidence of varices, encephalopathy, or ascites.  He has MELD score of 9. He is UTD with variceal and HCC surveillance.    We will obtain an MRI to further evaluate the liver lesion and his portal vein as USG was not optimal     2.  Symptomatic gallstones  We will refer to see Dr. Rowe. Patient is low risk from a liver disease standpoint given that he is CTP class A, and his MELD is 9.        RECOMMENDATIONS:  -- Obtain MRI liver  -- Surgery consult   -- Ensure sodium restriction to 2000 mg per day  -- Adequate protein diet (1.2 - 1.5g/kg/day)                  - Recommend multiple meals during the day, Snacks and shakes during the day/night                  - Can use Ensure/Boost drinks TID                  - Avoid raw shellfish and oysters (risk of Vibrio vulnificus infection)        Colorectal Cancer Screening  Last 4/2019, repeat in 5 years     RTC 6 months, sooner if symptomatic.       The visit lasted up to 25 minutes, with more than half of the time spent on counseling and education.  All questions were answered to patient's satisfaction     Patient seen and discussed with staff GI physician, Dr. Renteria, who agrees with my assessment and plan.      Sunil Briggs MD  Transplant Hepatology fellow  PGY 6  [phone]       Attestation signed by Mata Renteria MD at 2/18/2021  9:58 AM:  The patient was seen and examined.  The above assessment and plan was developed jointly with the fellow.      Thank you very much for allowing me to participate in the care of this patient.  If you have any questions regarding my recommendations,  please do not hesitate to contact me.         Mata Renteria MD      Professor of Medicine  [employer]      Executive Medical Director, Solid Organ Transplant Program  [facility]

## 2021-02-04 NOTE — LETTER
February 9, 2021      Gucci MARTÍNEZ Desmond  [address] [facility] [address]        Dear Patriciosara,    We are writing to inform you of your test results.    Looks OK     Resulted Orders   MR Liver wo & w Contrast   Result Value Ref Range    Radiologist flags Suggestion of pulmonary nodules     Narrative    MRI ABDOMEN    CLINICAL HISTORY:  Patient with cirrhosis. Prior history of portal  venous thrombus. Indeterminate liver lesion on prior ultrasound  1/28/2021.    TECHNIQUE: Images were acquired with and without intravenous contrast  through the abdomen. The following MR images were acquired: TrueFISP,  multiplanar T2 weighted, axial T1 in/out of phase, diffusion-weighted.  Multiplanar T1-weighted images with fat saturation were before  contrast administration and at multiple time points following the  administration of intravenous contrast. Contrast dose: 12 mL Gadavist    FINDINGS:    Comparison study: No similar study. CT exam 7/8/2020; ultrasound  1/28/2021.    Liver: Cirrhotic configuration of the liver parenchyma with nodular  contours, irregular surface, lacy T2 signal with delayed reticular  pattern of contrast enhancement concerning for fibrosis, hypertrophy  of the left and caudate lobes. Hepatic steatosis with dropout signal  in the out of phase sequence. No significant iron deposition. No  restricted diffusion throughout the liver parenchyma.    Scattered foci of arterial enhancement in the liver parenchyma with no  intermediate T2 signal, restricted diffusion, washout or  pseudocapsule, indeterminate for HCC. LIRADS 3.     No suspicious liver lesions meeting diagnostic criteria for HCC. No  suspicious lesions to correlate with previously seen hepatic focus on  ultrasound 1/26/2021.    Right and left portal veins are patent. Interval resolution of the  previously seen thrombus within the main portal vein. No significant  interval change in a chronic appearing nonocclusive thrombus about  the  splenoportal junction as well extending within the superior mesenteric  vein, series 18 images 45-57. Remaining abdominal vasculature is  patent.    No abdominal aortic aneurysm. Dilated portal vein, dilated splenic  vein, portosystemic collaterals with gastroesophageal varices as well  as collateral vessels at the splenic hilum.    Gallbladder: Cholelithiasis. No gallbladder wall thickening. No  pericholecystic fluid. No biliary tree dilation.    Spleen: Enlarged spleen measuring 17 cm in craniocaudad dimension. No  suspicious splenic lesions.    Kidneys: Multiple simple appearing renal cysts with increased T2  signal and no enhancement the largest exophytic in the left kidney  inferior pole measuring 4.5 cm. No suspicious renal lesions. No  hydronephrosis.    Adrenal glands: Unremarkable right adrenal gland. 4 mm focus of fat in  the left adrenal gland with no dropout signal in the out of phase  sequence, series 7 image 40, series 18 image 40, likely representing a  small myelolipoma is stable from the CT exam 7/8/2020.    Pancreas: Partially atrophic with preserved increased precontrast T1  signal. The main pancreatic duct is not dilated.    Bowel: Colonic diverticulosis. No dilated loops of bowel.  Mild-to-moderate hiatal hernia. Otherwise decompressed.    Lymph nodes: Borderline-enlarged retroperitoneal, gil hepatis and  mesenteric lymph nodes, likely reactive.    Blood vessels: As described above.    Lung bases: Mild bilateral lower lobe dependent atelectasis. No  pleural effusions. No pericardial effusions. Suggestion of scattered  sub-6 mm pulmonary nodules which could be further evaluated with chest  CT if clinically indicated.    Bones and soft tissues: The degenerative changes of the spine.  Scattered Schmorl nodes. Susceptibility metallic artifacts from  thoracolumbar spinal fusion/instrumentation. No convincing aggressive  bone lesions. Bilateral gynecomastia.    Mesentery and abdominal wall:  Unremarkable.    Ascites: No ascites.      Impression    IMPRESSION:    1. Cirrhotic configuration of the liver parenchyma. Hepatic steatosis.  No suspicious liver lesions meeting diagnostic criteria for HCC.  2. Scattered liver observations as described above, indeterminate for  HCC. LIRADS 3. Attention on follow-up studies.  3. Interval resolution of the previously seen thrombus within the main  portal vein. No significant interval change in a chronic appearing  nonocclusive thrombus about the splenoportal junction as well  extending within the superior mesenteric vein.  4. Based on this exam only, the patient is within Hiddenite criteria.  5. Sequela of portal hypertension including splenomegaly and multiple  portosystemic collaterals. No ascites.  6. Cholelithiasis. Colonic diverticulosis. Mild-to-moderate hiatal  hernia.  7. Suggestion of scattered sub-6 mm pulmonary nodules which could be  further evaluated with chest CT if clinically indicated.  8. Additional incidental findings as described above.    [Consider Follow Up: Suggestion of pulmonary nodules]    This report will be copied to the [facility] to ensure a  provider acknowledges the finding.         OPTN/LIRADS definitions.  LIRADS v2018.    LIRADS 1:  Definitely benign.  LIRADS 2:  Probably benign.  LIRADS 3:  Indeterminate for HCC.  LIRADS 4:  Probably HCC.  LIRADS M:  Probably malignant.  Not specific for HCC.  LIRADS 5TR- nonviable:  Previously treated HCC without residual  malignancy identified  LIRADS 5TR- viable:  Previously treated HCC with findings indicating  residual viable malignancy  LIRADS 5TR- equivocal:  Previously treated HCC with findings that may  be treatment changes or viable malignancy    OPTN 5a:  Diagnostic for HCC.  < 2 cm.  OPTN 5b:  Diagnostic for HCC.  > Or = 2 cm and < 5 cm.  OPTN 5x:  Diagnostic for HCC.  > Or = 5 cm.      Hiddenite criteria for liver transplantation:    1. Presence of no HCCs greater than 5 cm. One  HCC measuring 3-5 cm is  allowed if no other HCCs are present.  2. Maximum of 3 HCCs measuring 3 cm or less.  3. No vascular invasion.  4. No extrahepatic metastases.    CRISS SALINAS MD       If you have any questions or concerns, please call the clinic at the number listed above.       Sincerely,      Mata Renteria MD

## 2021-02-04 NOTE — NURSING NOTE
"Chief Complaint   Patient presents with     RECHECK     follow up Liver     Vital signs:  Temp: 97.4  F (36.3  C)   BP: (!) 147/66 Pulse: 68     SpO2: 95 %       Weight: 124.3 kg (274 lb 1.6 oz)  Estimated body mass index is 38.23 kg/m  as calculated from the following:    Height as of 7/9/20: 1.803 m (5' 11\").    Weight as of this encounter: 124.3 kg (274 lb 1.6 oz).      Jasmyne Vail, [facility]    "

## 2021-02-07 ENCOUNTER — ANCILLARY PROCEDURE (OUTPATIENT)
Dept: MRI IMAGING | Facility: CLINIC | Age: 71
End: 2021-02-07
Attending: INTERNAL MEDICINE
Payer: COMMERCIAL

## 2021-02-07 LAB — RADIOLOGIST FLAGS: NORMAL

## 2021-02-07 PROCEDURE — A9585 GADOBUTROL INJECTION: HCPCS | Performed by: RADIOLOGY

## 2021-02-07 PROCEDURE — 74183 MRI ABD W/O CNTR FLWD CNTR: CPT | Performed by: RADIOLOGY

## 2021-02-07 RX ORDER — GADOBUTROL 604.72 MG/ML
15 INJECTION INTRAVENOUS ONCE
Status: COMPLETED | OUTPATIENT
Start: 2021-02-07 | End: 2021-02-07

## 2021-02-07 RX ADMIN — GADOBUTROL 12 ML: 604.72 INJECTION INTRAVENOUS at 07:21

## 2021-02-08 ENCOUNTER — TELEPHONE (OUTPATIENT)
Dept: GASTROENTEROLOGY | Facility: CLINIC | Age: 71
End: 2021-02-08

## 2021-02-08 NOTE — TELEPHONE ENCOUNTER
Incidental finding noted on MRI 2/4/21.      Suggestion of pulmonary nodules.     Call back [phone] or notate in chart that Dr. Renteria viewed.     Kylee SNELL LPN  Hepatology Clinic

## 2021-02-09 NOTE — TELEPHONE ENCOUNTER
Dr. Renteria's reply (below) to incidental finding noted on MRI 2/4/21 suggestion of pulmonary nodules.    ----------------  Mata Renteria MD [place], Shannon, LPN  Caller: Unspecified (Yesterday,  1:17 PM)    In my opinion, it is fine.

## 2021-02-22 ENCOUNTER — OFFICE VISIT (OUTPATIENT)
Dept: TRANSPLANT | Facility: CLINIC | Age: 71
End: 2021-02-22
Attending: TRANSPLANT SURGERY
Payer: COMMERCIAL

## 2021-02-22 VITALS
HEART RATE: 63 BPM | SYSTOLIC BLOOD PRESSURE: 178 MMHG | OXYGEN SATURATION: 95 % | WEIGHT: 276.6 LBS | DIASTOLIC BLOOD PRESSURE: 81 MMHG | BODY MASS INDEX: 38.58 KG/M2

## 2021-02-22 DIAGNOSIS — K74.69 OTHER CIRRHOSIS OF LIVER (H): ICD-10-CM

## 2021-02-22 DIAGNOSIS — I10 HYPERTENSION GOAL BP (BLOOD PRESSURE) < 140/90: ICD-10-CM

## 2021-02-22 PROCEDURE — 99207 PR NO BILLABLE SERVICE THIS VISIT: CPT | Performed by: TRANSPLANT SURGERY

## 2021-02-22 RX ORDER — SPIRONOLACTONE 25 MG/1
25 TABLET ORAL DAILY
Qty: 90 TABLET | Refills: 1 | Status: SHIPPED | OUTPATIENT
Start: 2021-02-22 | End: 2021-08-26

## 2021-02-22 RX ORDER — URSODIOL 300 MG/1
300 CAPSULE ORAL 2 TIMES DAILY
Qty: 60 CAPSULE | Refills: 3 | Status: SHIPPED | OUTPATIENT
Start: 2021-02-22 | End: 2021-08-26

## 2021-02-22 NOTE — LETTER
2/22/2021         RE: Gucci Logan  [address] [facility] [address]        Dear Colleague,    Thank you for referring your patient, Gucci Logan, to the [facility]. Please see a copy of my visit note below.    MELD-Na score: 9 at 7/10/2020 10:38 AM  MELD score: 9 at 7/10/2020 10:38 AM  Calculated from:  Serum Creatinine: 1.02 mg/dL at 7/10/2020 10:38 AM  Serum Sodium: 137 mmol/L at 7/10/2020 10:38 AM  Total Bilirubin: 1.5 mg/dL at 7/10/2020 10:38 AM  INR(ratio): 1.14 at 7/8/2020  5:19 PM  Age: 70 years     Please see the notes of Dr. Renteria and Dr. Hope.  No billing for this visit       Again, thank you for allowing me to participate in the care of your patient.        Sincerely,        Kike Rowe MD

## 2021-03-02 NOTE — PROGRESS NOTES
MELD-Na score: 9 at 7/10/2020 10:38 AM  MELD score: 9 at 7/10/2020 10:38 AM  Calculated from:  Serum Creatinine: 1.02 mg/dL at 7/10/2020 10:38 AM  Serum Sodium: 137 mmol/L at 7/10/2020 10:38 AM  Total Bilirubin: 1.5 mg/dL at 7/10/2020 10:38 AM  INR(ratio): 1.14 at 7/8/2020  5:19 PM  Age: 70 years     Please see the notes of Dr. Renteria and Dr. Hope.  No billing for this visit

## 2021-03-14 ENCOUNTER — HEALTH MAINTENANCE LETTER (OUTPATIENT)
Age: 71
End: 2021-03-14

## 2021-03-24 ENCOUNTER — TELEPHONE (OUTPATIENT)
Dept: FAMILY MEDICINE | Facility: CLINIC | Age: 71
End: 2021-03-24

## 2021-03-24 DIAGNOSIS — R22.30 NODULE OF FINGER, UNSPECIFIED LATERALITY: Primary | ICD-10-CM

## 2021-03-24 NOTE — TELEPHONE ENCOUNTER
Patient left me a message about finger nodules. Good to see orthopedics for this.  I did referral  Richi Jaramillo MD

## 2021-03-25 ENCOUNTER — NURSE TRIAGE (OUTPATIENT)
Dept: NURSING | Facility: CLINIC | Age: 71
End: 2021-03-25

## 2021-03-26 NOTE — TELEPHONE ENCOUNTER
Pt is calling.    He was seen on 02/22/2021. Given medication to try-ursodiol. He is down to the last 2 pills tomorrow. Unsure if he needs a refill. He doesn't believe so, as he stated that they do not work. He stated that he does not feel any better at all.  Does he need to be seen again, or is there something else he can do or take? He thinks that he may need to be seen again or more testing be done.  I advised him that we will send a message to his physician and then will call him back with a plan.   He stated that he cannot do Mychart. He asks that no messages be sent that way. He is not able to access it and has tried to get it up and working but they have not been able to fix the problem.   Text, email, or then phone to get back a hold of him. He responds best to email if able to email, otherwise, call back.    Reason for Disposition    Caller has NON-URGENT medication question about med that PCP prescribed and triager unable to answer question    Additional Information    Negative: Drug overdose and nurse unable to answer question    Negative: Caller requesting information not related to medicine    Negative: Caller requesting a prescription for Strep throat and has a positive culture result    Negative: Rash while taking a medication or within 3 days of stopping it    Negative: Immunization reaction suspected    Negative: [1] Asthma AND [2] having symptoms of asthma (cough, wheezing, etc)    Negative: MORE THAN A DOUBLE DOSE of a prescription or over-the-counter (OTC) drug    Negative: [1] DOUBLE DOSE (an extra dose or lesser amount) of over-the-counter (OTC) drug AND [2] any symptoms (e.g., dizziness, nausea, pain, sleepiness)    Negative: [1] DOUBLE DOSE (an extra dose or lesser amount) of prescription drug AND [2] any symptoms (e.g., dizziness, nausea, pain, sleepiness)    Negative: Took another person's prescription drug    Negative: [1] DOUBLE DOSE (an extra dose or lesser amount) of prescription drug  "AND [2] NO symptoms (Exception: a double dose of antibiotics)    Negative: Diabetes drug error or overdose (e.g., insulin or extra dose)    Negative: [1] Request for URGENT new prescription or refill of \"essential\" medication (i.e., likelihood of harm to patient if not taken) AND [2] triager unable to fill per unit policy    Negative: [1] Prescription not at pharmacy AND [2] was prescribed today by PCP    Negative: Pharmacy calling with prescription questions and triager unable to answer question    Negative: Caller has urgent medication question about med that PCP prescribed and triager unable to answer question    Protocols used: MEDICATION QUESTION CALL-A-    Carissa Gabriel RN  [facility] Nurse Advisor  3/25/2021 at 8:25 PM        "

## 2021-03-29 ENCOUNTER — VIRTUAL VISIT (OUTPATIENT)
Dept: FAMILY MEDICINE | Facility: CLINIC | Age: 71
End: 2021-03-29
Payer: COMMERCIAL

## 2021-03-29 DIAGNOSIS — E03.9 HYPOTHYROIDISM, UNSPECIFIED TYPE: ICD-10-CM

## 2021-03-29 DIAGNOSIS — R53.83 FATIGUE, UNSPECIFIED TYPE: ICD-10-CM

## 2021-03-29 DIAGNOSIS — I10 HYPERTENSION GOAL BP (BLOOD PRESSURE) < 140/90: ICD-10-CM

## 2021-03-29 DIAGNOSIS — K74.60 HEPATIC CIRRHOSIS, UNSPECIFIED HEPATIC CIRRHOSIS TYPE, UNSPECIFIED WHETHER ASCITES PRESENT (H): ICD-10-CM

## 2021-03-29 DIAGNOSIS — E11.43 TYPE 2 DIABETES MELLITUS WITH DIABETIC AUTONOMIC NEUROPATHY, WITHOUT LONG-TERM CURRENT USE OF INSULIN (H): Primary | ICD-10-CM

## 2021-03-29 PROCEDURE — 99214 OFFICE O/P EST MOD 30 MIN: CPT | Mod: 95 | Performed by: FAMILY MEDICINE

## 2021-03-29 RX ORDER — METOPROLOL SUCCINATE 25 MG/1
TABLET, EXTENDED RELEASE ORAL
Qty: 270 TABLET | Refills: 1 | Status: SHIPPED | OUTPATIENT
Start: 2021-03-29 | End: 2021-09-26

## 2021-03-29 NOTE — PROGRESS NOTES
Canelo is a 70 year old who is being evaluated via a billable telephone visit.      What phone number would you like to be contacted at? [phone]  How would you like to obtain your AVS? Mail AVS.          Subjective   Canelo is a 70 year old who presents for the following health issues     HPI     Hypertension Follow-up      Do you check your blood pressure regularly outside of the clinic? No     Are you following a low salt diet? Yes    Are your blood pressures ever more than 140 on the top number (systolic) OR more   than 90 on the bottom number (diastolic), for example 140/90? Yes      How many servings of fruits and vegetables do you eat daily?  2-3    On average, how many sweetened beverages do you drink each day (Examples: soda, juice, sweet tea, etc.  Do NOT count diet or artificially sweetened beverages)?   0    How many days per week do you exercise enough to make your heart beat faster? 7    How many minutes a day do you exercise enough to make your heart beat faster? 5-7,000 steps a day    How many days per week do you miss taking your medication? 0    Patient says he has concerns about ursodiol prescription, he thinks it doesn't seem to be working.     Pt would like to discuss results of recent MR liver scan.     3 weeks ago, he changed his truck oil and was using a step ladder and he had a fall, did get some bruising on his torso. Tenderness around his spleen area.              Review of Systems   Has orthopedics appointment coming up for hand          Objective           Vitals:  No vitals were obtained today due to virtual visit.    Feels bloated  On edge    Not eating less on the medication    On ursodiol for 30 days    Side effects started right after the ursodiol was started    Balance getting worse    Would like hemoglobin a1c checked again    Patient feels cognition okay        Physical Exam   healthy, alert and no distress  PSYCH: Alert and oriented times 3; coherent speech, normal   rate and  volume, able to articulate logical thoughts, able   to abstract reason, no tangential thoughts, no hallucinations   or delusions  His affect is normal  RESP: No cough, no audible wheezing, able to talk in full sentences  Remainder of exam unable to be completed due to telephone visits         ASSESSMENT / PLAN:  (E11.43) Type 2 diabetes mellitus with diabetic autonomic neuropathy, without long-term current use of insulin (H)  (primary encounter diagnosis)  Comment: recheck this at lab only appointment   Plan: Hemoglobin A1c             (I10) Hypertension goal BP (blood pressure) < 140/90  Comment: needs refill   Plan: metoprolol succinate ER (TOPROL-XL) 25 MG 24 hr        tablet        Done     (E03.9) Hypothyroidism, unspecified type  Comment: check labs, adjust dose if needed   Plan: TSH, T4 free             (R53.83) Fatigue, unspecified type  Comment: check labs   Plan: Comprehensive metabolic panel, CBC with         platelets differential             (K74.60) Hepatic cirrhosis, unspecified hepatic cirrhosis type, unspecified whether ascites present (H)  Comment: discussed in detail with patient.  He does also see liver specialist.  The ursodiol caused bad side effects right after starting it.  On for a month now.  I advised he go off it to see if symptoms resolve.  Follow up with liver specialist.  Of note he has had gallstones since 2014 at least but even on recent imaging no other evidence of gall bladder dysfunction.  There has been a progression of the liver parenychymal problems.   Plan: INR               I reviewed the patient's medications, allergies, medical history, family history, and social history.    Richi Jaramillo MD            Phone call duration: 30 minutes ( 8:27 to 8:57 am )    Richi Jaramillo MD

## 2021-03-31 ENCOUNTER — OFFICE VISIT (OUTPATIENT)
Dept: ORTHOPEDICS | Facility: CLINIC | Age: 71
End: 2021-03-31
Attending: FAMILY MEDICINE
Payer: COMMERCIAL

## 2021-03-31 ENCOUNTER — ANCILLARY PROCEDURE (OUTPATIENT)
Dept: GENERAL RADIOLOGY | Facility: CLINIC | Age: 71
End: 2021-03-31
Attending: ORTHOPAEDIC SURGERY
Payer: COMMERCIAL

## 2021-03-31 VITALS
SYSTOLIC BLOOD PRESSURE: 147 MMHG | BODY MASS INDEX: 38.22 KG/M2 | DIASTOLIC BLOOD PRESSURE: 72 MMHG | HEART RATE: 66 BPM | OXYGEN SATURATION: 95 % | WEIGHT: 273 LBS | HEIGHT: 71 IN

## 2021-03-31 DIAGNOSIS — M79.641 BILATERAL HAND PAIN: ICD-10-CM

## 2021-03-31 DIAGNOSIS — M79.642 BILATERAL HAND PAIN: Primary | ICD-10-CM

## 2021-03-31 DIAGNOSIS — M72.0 DUPUYTREN'S DISEASE OF PALM OF BOTH HANDS: ICD-10-CM

## 2021-03-31 DIAGNOSIS — M79.642 BILATERAL HAND PAIN: ICD-10-CM

## 2021-03-31 DIAGNOSIS — R22.30 NODULE OF FINGER, UNSPECIFIED LATERALITY: ICD-10-CM

## 2021-03-31 DIAGNOSIS — M79.641 BILATERAL HAND PAIN: Primary | ICD-10-CM

## 2021-03-31 PROCEDURE — 73130 X-RAY EXAM OF HAND: CPT | Mod: LT | Performed by: RADIOLOGY

## 2021-03-31 PROCEDURE — 99203 OFFICE O/P NEW LOW 30 MIN: CPT | Performed by: ORTHOPAEDIC SURGERY

## 2021-03-31 ASSESSMENT — PAIN SCALES - GENERAL: PAINLEVEL: MODERATE PAIN (4)

## 2021-03-31 ASSESSMENT — MIFFLIN-ST. JEOR: SCORE: 2025.45

## 2021-03-31 NOTE — PROGRESS NOTES
"CHIEF COMPLAINT:   Chief Complaint   Patient presents with     Hand Pain     Bilteral hand pain. Onset: years but has gotten worse. Patient notes he has a lot of work planned this summer and needs to use his hands. Pain is on the palm side. Feels like there are little cysts. Pain increases with bending the little finger. No tx.      Gucci Logan is seen today in the [facility] for evaluation of bilateral hand pain at the request of Richi Nowak     HISTORY:  Gucci Logan is a 70 year old male , Right -hand dominant who is seen in for Bilateral hand pain for years, getting worse. The symptoms have been episodic. No treatments. He locates pain mostly in the palms, feels like there are \"cysts\" in the palm. Pain is worse with bending the small finger, using the hands.     Pain with pressure. No locking. Noticed \"nodules\" in the palms about 6 months ago. Occasional tingling.    He has a lot of worked planned for this summer and needs to use his hands.    Suspected cause: Due to unknown factors.    Pain severity: 4/10  Pain quality: dull  Frequency of symptoms: are constant.  Aggravating Factors: gripping, bending.  Relieving Factors: stretching.  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    Other PMH:  has a past medical history of Arthritis, Esophageal reflux (3/1/2014), Hearing problem, Hiatal hernia, Hypertension, Liver disease, Obesity (1/24/2013), Obstructive sleep apnea, and Sleep apnea. He also has no past medical history of Complication of anesthesia, Difficult intubation, Malignant hyperthermia, Motion sickness, PONV (postoperative nausea and vomiting), or Spinal headache.  Patient Active Problem List    Diagnosis Date Noted     Abdominal pain 07/09/2020     Priority: Medium     Portal vein thrombosis 07/09/2020     Priority: Medium     Type 2 diabetes mellitus with diabetic " autonomic neuropathy, without long-term current use of insulin (H) 02/20/2020     Priority: Medium     Other cirrhosis of liver (H) 02/20/2020     Priority: Medium     Obesity (BMI 35.0-39.9) with comorbidity (H) 11/09/2018     Priority: Medium     Diabetes mellitus, type 2 (H) 11/09/2018     Priority: Medium     Hyperlipidemia LDL goal <100 04/19/2017     Priority: Medium     Impaired fasting glucose 04/19/2017     Priority: Medium     Gastroesophageal reflux disease, esophagitis presence not specified 10/06/2016     Priority: Medium     IMO Regulatory Load OCT 2020       Obesity, unspecified obesity severity, unspecified obesity type 12/17/2015     Priority: Medium     Anxiety 11/27/2014     Priority: Medium     Hypertension goal BP (blood pressure) < 140/90 02/08/2013     Priority: Medium     Advanced directives, counseling/discussion 01/16/2013     Priority: Medium     Advance Care Planning:   ACP Review and Resources Provided:  Reviewed chart for advance care plan.  Gucci MARTÍNEZ Desmond has no plan or code status on file however states presence of ACP document. Copy requested. Confirmed code status reflects current choices pending receipt of document/advance care plan review. Confirmed/documented designated decision maker(s). See permanent comments section of demographics in clinical tab.   Added by Elyssa Olguin on 7/22/2014  Patient states has Advance Directive and will bring in a copy to clinic. 1/16/2013              CARDIOVASCULAR SCREENING; LDL GOAL LESS THAN 130 01/16/2013     Priority: Medium         Surgical Hx:  has a past surgical history that includes SPINAL FUSION,ANT,EA ADNL LEVEL; OPEN RX ANKLE DISLOCATN+FIXATN; arthroscopy knee rt/lt; Endoscopic endonasal surgery (1994); rotator cuff repair rt/lt (Right); Hernia surgery (Left, 1994); endoscopy (2-19-15); nasal/sinus polypectomy; Eye surgery; cataract iol, rt/lt (Nov and Dec 2017); Repair ptosis (Left, 10/16/2018); Repair Ptosis Brow  (Bilateral, 10/16/2018); Dacryocystorhinostomy (Left, 10/16/2018); Esophagoscopy, gastroscopy, duodenoscopy (EGD), combined (N/A, 4/24/2019); and Colonoscopy (N/A, 4/24/2019).    Medications:   Current Outpatient Medications:      alcohol swab prep pads, Use to swab area of injection/jose antonio as directed. (Patient not taking: Reported on 2/22/2021), Disp: 100 each, Rfl: 3     atorvastatin (LIPITOR) 10 MG tablet, Take 1 tablet by mouth once daily, Disp: 90 tablet, Rfl: 1     blood glucose (NO BRAND SPECIFIED) test strip, Use to test blood sugar 1 times daily or as directed. To accompany: Blood Glucose Monitor Brands: per insurance. (Patient not taking: Reported on 2/22/2021), Disp: 100 strip, Rfl: 6     blood glucose monitoring (NO BRAND SPECIFIED) meter device kit, Use to test blood sugar 1 times daily or as directed. Preferred blood glucose meter OR supplies to accompany: Blood Glucose Monitor Brands: per insurance. (Patient not taking: Reported on 2/22/2021), Disp: 1 kit, Rfl: 0     cefdinir (OMNICEF) 300 MG capsule, Take 1 capsule (300 mg) by mouth 2 times daily (Patient not taking: Reported on 2/22/2021), Disp: 20 capsule, Rfl: 0     cholecalciferol 25 MCG (1000 UT) TABS, Take 1,000 Units by mouth daily 2 tabs daily, Disp: , Rfl:      cyclobenzaprine (FLEXERIL) 5 MG tablet, Take 1 tablet (5 mg) by mouth 3 times daily as needed for muscle spasms (Patient taking differently: Take 5 mg by mouth 3 times daily as needed for muscle spasms prn), Disp: 20 tablet, Rfl: 0     glutamine 500 MG capsule, Take 500 mg by mouth daily, Disp: , Rfl:      hydrochlorothiazide (HYDRODIURIL) 50 MG tablet, Take 1 tablet (50 mg) by mouth daily, Disp: 90 tablet, Rfl: 1     lactobacillus rhamnosus, GG, (CULTURELL) capsule, Take 1 capsule by mouth 2 times daily, Disp: , Rfl:      levothyroxine (SYNTHROID/LEVOTHROID) 25 MCG tablet, TAKE 1 TABLET (25 MCG) BY MOUTH ONCE DAILY, Disp: 30 tablet, Rfl: 1     losartan (COZAAR) 100 MG tablet, Take 1  tablet by mouth once daily, Disp: 90 tablet, Rfl: 1     metFORMIN (GLUCOPHAGE) 500 MG tablet, TAKE 1 TABLET BY MOUTH TWICE DAILY WITH A MEAL, Disp: 180 tablet, Rfl: 1     metoprolol succinate ER (TOPROL-XL) 25 MG 24 hr tablet, 75 mg ( 3 tabs ) po daily, Disp: 270 tablet, Rfl: 1     Multiple Vitamins-Minerals (MULTIVITAMIN ADULT PO), Take 1 tablet by mouth daily , Disp: , Rfl:      omeprazole (PRILOSEC) 20 MG DR capsule, TAKE 1 CAPSULE BY MOUTH ONCE DAILY 30 TO 60 MINUTES BEFORE A MEAL, Disp: 90 capsule, Rfl: 1     ondansetron (ZOFRAN-ODT) 4 MG ODT tab, Take 1 tablet (4 mg) by mouth every 6 hours as needed for nausea or vomiting, Disp: 10 tablet, Rfl: 0     Pediatric Multivitamins-Fl (MULTI VIT/FL) 0.25 MG CHEW, , Disp: , Rfl:      spironolactone (ALDACTONE) 25 MG tablet, Take 1 tablet (25 mg) by mouth daily, Disp: 90 tablet, Rfl: 1     thin (NO BRAND SPECIFIED) lancets, Use with lanceting device. To accompany: Blood Glucose Monitor Brands: per insurance., Disp: 100 each, Rfl: 3     traMADol (ULTRAM) 50 MG tablet, TAKE 1 TABLET BY MOUTH EVERY 6 HOURS AS NEEDED FOR MODERATE PAIN, Disp: 20 tablet, Rfl: 0     ursodiol (ACTIGALL) 300 MG capsule, Take 1 capsule (300 mg) by mouth 2 times daily, Disp: 60 capsule, Rfl: 3    Allergies:   Allergies   Allergen Reactions     Influenza Vaccines Other (See Comments)     delirium  fever         Social Hx: retired.  reports that he quit smoking about 22 years ago. His smoking use included cigarettes. He started smoking about 31 years ago. He smoked 0.00 packs per day for 5.00 years. He has never used smokeless tobacco. He reports current alcohol use. He reports that he does not use drugs.    Family Hx: family history includes Alzheimer Disease (age of onset: 85) in his mother; Cancer in his father; Cerebrovascular Disease (age of onset: 50) in his father; Dementia in his mother; Hypertension in his father.. Negative for bleeding/clotting disorders. Negative for adverse anesthesia  reactions.    REVIEW OF SYSTEMS: 10 point ROS neg other than the symptoms noted above in the HPI and PMH. Notables include  CONSTITUTIONAL:NEGATIVE for fever, chills, change in weight  INTEGUMENTARY/SKIN: NEGATIVE for worrisome rashes, moles or lesions  MUSCULOSKELETAL:See HPI above  Neurology: see HPI above.      EXAM:  GENERAL APPEARANCE: healthy, alert and no distress   GAIT: NORMAL  SKIN: no suspicious lesions or rashes  RESPIRATORY: No increased work of breathing.  NEURO:     strength: decreased,    thenar fasiculations: negative    Thenar atrophy:Mild.    Sensation intact in all digits,    reflexes normal in upper extremities.   PSYCH:  mentation appears normal and affect normal, not anxious.    MUSCULOSKELETAL:    RIGHT HAND/FINGERS:    Skin intact. No abnormal skin discoloration, erythema or ecchymosis.   No nail pitting or clubbing.  Normal wear pattern, color and tone.  Visible palpable cord/nodules right ring > small finger ray in the palm.   No contracture. Table top test negative.  There is mild tenderness in the areas of cord/nodules of the palm.  There is no ecchymosis.  There is no erythema of the surrounding skin.  There is no maceration of the skin.  There is no other deformity in the area.  Intact extensors. No extensor lag.      1st carpometacarpal grind: negative    Intact sensation light touch median, radial, ulnar nerves of the hand  Intact sensation to the radial and ulnar digital nerves of the fingers, as well as the finger tips.  Intact epl fpl fdp edc wrist flexion/extension biceps/triceps deltoid  Brisk capillary refill to all fingers.   Palpable radial pulse, 2+.      LEFT HAND/FINGERS:    Skin intact. No abnormal skin discoloration, erythema or ecchymosis.   No nail pitting or clubbing.  Normal wear pattern, color and tone.  Visible palpable cord/nodules left ring < small finger ray in the palm.   No contracture. Table top test negative.  There is mild tenderness in the areas of  cord/nodules of the palm.    There is no ecchymosis.  There is no erythema of the surrounding skin.  There is no maceration of the skin.  There is no other deformity in the area.  Intact extensors. No extensor lag.      1st carpometacarpal grind: negative    Intact sensation light touch median, radial, ulnar nerves of the hand  Intact sensation to the radial and ulnar digital nerves of the fingers, as well as the finger tips.  Intact epl fpl fdp edc wrist flexion/extension biceps/triceps deltoid  Brisk capillary refill to all fingers.   Palpable radial pulse, 2+.      XRAYS: 3 views bilateral hands 3/31/2021 reviewed, No obvious fracture or dislocation. No obvious bony abnormality or lesion..      ASSESSMENT/PLAN: 71yo RHD male with bilateral hand pain, dupuytrens disease without contracture.    * discussed with patient that the nodules/cords in the palms consistent with dupuytren's disease  * nonsurgical treatment at this time.    * Dupuytren's disease is essentially abnormal scar tissue of the normal fascial tissue in the palm (benign fibroproliferative disorder of the superficial palmar and digital fascia that can form subcutaneous nodules, cords, palmar pitting, webspace contractures and flexion contractures)  * progression is usually slow over years to decades.  * associated with northern  descent, males > females, increased with age  * 10-40% family history  * ring finger most commonly involved, right hand more than left hand.  * can be seen in other areas of body (plantar foot, dorsum of PIP joints, penile fibromatosis)  * has been recognized with: diabetes mellitus, smoking, copd, HIV, alcohol, seizure disorders (anti-convulsants)   * treatment is usually observation with minimal or no contracture.  * if contracture develops and causes functional problems or pain, then treatment is either surgical (fasciotomy or fasciectomy) versus medical with enzymatic (collagenase) injections.  * also consider  cortisone injections, splinting as other options for nonsurgical treatment.  * typical indications for surgery: contractures of metacarpal phalangeal joint >30 degrees or any PIP contracture.            Edson Reza M.D., M.S.  Dept. of Orthopaedic Surgery  [facility]

## 2021-03-31 NOTE — PATIENT INSTRUCTIONS
Patient Education     Understanding Dupuytren Contracture    Dupuytren contracture is a disease that occurs when the fibrous tissue beneath the skin of the palm and fingers thickens. This tissue is called the palmar fascia. If the disease gets worse, it can cause small hard knots (nodules) to form under the skin. Hard bands (cords) of tissue can also form. Over time, your fingers may curl and bend toward the palm. This effect is called contracture. This can make it hard to straighten your fingers.    How to say it  [id number]   What causes Dupuytren contracture?  Doctors don t know the exact cause of Dupuytren contracture. It may run in families, especially those of Northern  descent. The disease is more common in adults older than 50. It's also more common in men than in women.   Symptoms of Dupuytren contracture  Symptoms for Dupuytren contracture tend to develop slowly. They may include:    Pitted, dimpled, or  puckered  skin over the palm    Hard lumps that form on the palm. Sometimes, the lumps are tender at first.    Scar-like bands that form across the palm    Fingers that bend toward the palm. The ring and little fingers are most often affected.    You are not able to place the palm flat on a surface    You have trouble holding or grasping objects    Hand pain (less common)  Treating Dupuytren contracture  Treatment for Dupuytren contracture depends on how serious your symptoms are. Treatment can t cure the disease. But it can help reduce symptoms or make it easier to move your fingers. Treatments may include:     Shots of medicine. These usually are anti-inflammatory medicines called corticosteroids. These shots may help relieve symptoms and reduce the size of nodules.    Enzyme shots. These may be used to break up the thickened tissue. This helps reduce contracture. It may allow your fingers to straighten again.    Needle aponeurotomy. Shallow needle punctures are made through  the skin to break up the thickened issue. This helps reduce contracture. It may allow your fingers to straighten again.    Hand exercises. These are often prescribed along with other treatments. They may help stretch and improve the range of motion in the hand and fingers.    Surgery. Various surgical techniques may be used to remove some of the thickened tissue in the palm. This helps reduce contracture. It also improves normal finger motion and hand function.  Possible complications of Dupuytren contracture  In some people, the contracture in the hand may get worse over time. This can lead to joint stiffness, deformity, and reduced function of the hand.   When to call your healthcare provider  Call your healthcare provider right away if you have any of these:    Fever of 100.4 F (38 C) or higher, or as directed by your provider    Chills    Symptoms that don t get better with treatment, or get worse    New symptoms  Kendra last reviewed this educational content on 12/1/2019 2000-2020 [employer]. All rights reserved. This information is not intended as a substitute for professional medical care. Always follow your healthcare professional's instructions.           Patient Education     Treating Dupuytren Contracture    Dupuytren contracture may require no treatment if your symptoms are mild. If your symptoms are more severe or they worsen, you may need treatment. Steroid or enyzme injections can be done to weaken and disrupt the cords. Another option is surgery. Surgery may be performed to remove the affected tissue. Physical therapy is often required afterwards to get the best results. Recovery may take several months. Your healthcare provider may suggest surgery if use of your hand is sharply limited.  Your surgery experience  Surgery removes some of the palmar fascia. This can take a few hours. You may be awake, but drowsy, during surgery. Or, you may have general anesthesia (where you   sleep ). Your healthcare provider may use a zigzag-shaped incision to reach the fascia. A zigzag allows better healing and finger motion. When surgery is complete, part of your incision may be left open to help drainage. As you heal, it will close on its own. A thick bandage or cast will be placed over your hand and forearm. You most likely will go home the day of surgery.  After surgery  In the first few days, keep your hand elevated above your heart to reduce swelling and pain. And take any pain pills your healthcare provider prescribed. If you re asked to use ice, follow your healthcare provider s advice. In about a week, your stitches will be removed. You then may need to wear a splint. Soon, you ll start hand therapy and exercises that can help you heal.  Risks and complications  Your healthcare provider will give you details about the possible risks and complications of surgery. These may include:    Stiff fingers    Thick scarring on palm    Numbness in hand    Swelling around finger joints    Impaired blood flow to hand    Long-term pain or permanent stiffness in hand (rare)    Infection  Kendra last reviewed this educational content on 2/1/2018 2000-2020 [employer]. All rights reserved. This information is not intended as a substitute for professional medical care. Always follow your healthcare professional's instructions.

## 2021-03-31 NOTE — LETTER
"    3/31/2021         RE: Gucci Logan  [address]  [facility] [address]        Dear Colleague,    Thank you for referring your patient, Gucci Logan, to the [facility]. Please see a copy of my visit note below.    CHIEF COMPLAINT:   Chief Complaint   Patient presents with     Hand Pain     Bilteral hand pain. Onset: years but has gotten worse. Patient notes he has a lot of work planned this summer and needs to use his hands. Pain is on the palm side. Feels like there are little cysts. Pain increases with bending the little finger. No tx.      Gucci Logan is seen today in the [facility] for evaluation of bilateral hand pain at the request of Richi Nowak     HISTORY:  Gucci Logan is a 70 year old male , Right -hand dominant who is seen in for Bilateral hand pain for years, getting worse. The symptoms have been episodic. No treatments. He locates pain mostly in the palms, feels like there are \"cysts\" in the palm. Pain is worse with bending the small finger, using the hands.     Pain with pressure. No locking. Noticed \"nodules\" in the palms about 6 months ago. Occasional tingling.    He has a lot of worked planned for this summer and needs to use his hands.    Suspected cause: Due to unknown factors.    Pain severity: 4/10  Pain quality: dull  Frequency of symptoms: are constant.  Aggravating Factors: gripping, bending.  Relieving Factors: stretching.  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    Other PMH:  has a past medical history of Arthritis, Esophageal reflux (3/1/2014), Hearing problem, Hiatal hernia, Hypertension, Liver disease, Obesity (1/24/2013), Obstructive sleep apnea, and Sleep apnea. He also has no past medical history of Complication of anesthesia, Difficult intubation, Malignant hyperthermia, Motion sickness, PONV (postoperative " nausea and vomiting), or Spinal headache.  Patient Active Problem List    Diagnosis Date Noted     Abdominal pain 07/09/2020     Priority: Medium     Portal vein thrombosis 07/09/2020     Priority: Medium     Type 2 diabetes mellitus with diabetic autonomic neuropathy, without long-term current use of insulin (H) 02/20/2020     Priority: Medium     Other cirrhosis of liver (H) 02/20/2020     Priority: Medium     Obesity (BMI 35.0-39.9) with comorbidity (H) 11/09/2018     Priority: Medium     Diabetes mellitus, type 2 (H) 11/09/2018     Priority: Medium     Hyperlipidemia LDL goal <100 04/19/2017     Priority: Medium     Impaired fasting glucose 04/19/2017     Priority: Medium     Gastroesophageal reflux disease, esophagitis presence not specified 10/06/2016     Priority: Medium     IMO Regulatory Load OCT 2020       Obesity, unspecified obesity severity, unspecified obesity type 12/17/2015     Priority: Medium     Anxiety 11/27/2014     Priority: Medium     Hypertension goal BP (blood pressure) < 140/90 02/08/2013     Priority: Medium     Advanced directives, counseling/discussion 01/16/2013     Priority: Medium     Advance Care Planning:   ACP Review and Resources Provided:  Reviewed chart for advance care plan.  Gucci Logan has no plan or code status on file however states presence of ACP document. Copy requested. Confirmed code status reflects current choices pending receipt of document/advance care plan review. Confirmed/documented designated decision maker(s). See permanent comments section of demographics in clinical tab.   Added by Elyssa Olguin on 7/22/2014  Patient states has Advance Directive and will bring in a copy to clinic. 1/16/2013              CARDIOVASCULAR SCREENING; LDL GOAL LESS THAN 130 01/16/2013     Priority: Medium         Surgical Hx:  has a past surgical history that includes SPINAL FUSION,ANT,EA ADNL LEVEL; OPEN RX ANKLE DISLOCATN+FIXATN; arthroscopy knee rt/lt; Endoscopic  endonasal surgery (1994); rotator cuff repair rt/lt (Right); Hernia surgery (Left, 1994); endoscopy (2-19-15); nasal/sinus polypectomy; Eye surgery; cataract iol, rt/lt (Nov and Dec 2017); Repair ptosis (Left, 10/16/2018); Repair Ptosis Brow (Bilateral, 10/16/2018); Dacryocystorhinostomy (Left, 10/16/2018); Esophagoscopy, gastroscopy, duodenoscopy (EGD), combined (N/A, 4/24/2019); and Colonoscopy (N/A, 4/24/2019).    Medications:   Current Outpatient Medications:      alcohol swab prep pads, Use to swab area of injection/jose antonio as directed. (Patient not taking: Reported on 2/22/2021), Disp: 100 each, Rfl: 3     atorvastatin (LIPITOR) 10 MG tablet, Take 1 tablet by mouth once daily, Disp: 90 tablet, Rfl: 1     blood glucose (NO BRAND SPECIFIED) test strip, Use to test blood sugar 1 times daily or as directed. To accompany: Blood Glucose Monitor Brands: per insurance. (Patient not taking: Reported on 2/22/2021), Disp: 100 strip, Rfl: 6     blood glucose monitoring (NO BRAND SPECIFIED) meter device kit, Use to test blood sugar 1 times daily or as directed. Preferred blood glucose meter OR supplies to accompany: Blood Glucose Monitor Brands: per insurance. (Patient not taking: Reported on 2/22/2021), Disp: 1 kit, Rfl: 0     cefdinir (OMNICEF) 300 MG capsule, Take 1 capsule (300 mg) by mouth 2 times daily (Patient not taking: Reported on 2/22/2021), Disp: 20 capsule, Rfl: 0     cholecalciferol 25 MCG (1000 UT) TABS, Take 1,000 Units by mouth daily 2 tabs daily, Disp: , Rfl:      cyclobenzaprine (FLEXERIL) 5 MG tablet, Take 1 tablet (5 mg) by mouth 3 times daily as needed for muscle spasms (Patient taking differently: Take 5 mg by mouth 3 times daily as needed for muscle spasms prn), Disp: 20 tablet, Rfl: 0     glutamine 500 MG capsule, Take 500 mg by mouth daily, Disp: , Rfl:      hydrochlorothiazide (HYDRODIURIL) 50 MG tablet, Take 1 tablet (50 mg) by mouth daily, Disp: 90 tablet, Rfl: 1     lactobacillus rhamnosus, GG,  (CULTURELL) capsule, Take 1 capsule by mouth 2 times daily, Disp: , Rfl:      levothyroxine (SYNTHROID/LEVOTHROID) 25 MCG tablet, TAKE 1 TABLET (25 MCG) BY MOUTH ONCE DAILY, Disp: 30 tablet, Rfl: 1     losartan (COZAAR) 100 MG tablet, Take 1 tablet by mouth once daily, Disp: 90 tablet, Rfl: 1     metFORMIN (GLUCOPHAGE) 500 MG tablet, TAKE 1 TABLET BY MOUTH TWICE DAILY WITH A MEAL, Disp: 180 tablet, Rfl: 1     metoprolol succinate ER (TOPROL-XL) 25 MG 24 hr tablet, 75 mg ( 3 tabs ) po daily, Disp: 270 tablet, Rfl: 1     Multiple Vitamins-Minerals (MULTIVITAMIN ADULT PO), Take 1 tablet by mouth daily , Disp: , Rfl:      omeprazole (PRILOSEC) 20 MG DR capsule, TAKE 1 CAPSULE BY MOUTH ONCE DAILY 30 TO 60 MINUTES BEFORE A MEAL, Disp: 90 capsule, Rfl: 1     ondansetron (ZOFRAN-ODT) 4 MG ODT tab, Take 1 tablet (4 mg) by mouth every 6 hours as needed for nausea or vomiting, Disp: 10 tablet, Rfl: 0     Pediatric Multivitamins-Fl (MULTI VIT/FL) 0.25 MG CHEW, , Disp: , Rfl:      spironolactone (ALDACTONE) 25 MG tablet, Take 1 tablet (25 mg) by mouth daily, Disp: 90 tablet, Rfl: 1     thin (NO BRAND SPECIFIED) lancets, Use with lanceting device. To accompany: Blood Glucose Monitor Brands: per insurance., Disp: 100 each, Rfl: 3     traMADol (ULTRAM) 50 MG tablet, TAKE 1 TABLET BY MOUTH EVERY 6 HOURS AS NEEDED FOR MODERATE PAIN, Disp: 20 tablet, Rfl: 0     ursodiol (ACTIGALL) 300 MG capsule, Take 1 capsule (300 mg) by mouth 2 times daily, Disp: 60 capsule, Rfl: 3    Allergies:   Allergies   Allergen Reactions     Influenza Vaccines Other (See Comments)     delirium  fever         Social Hx: retired.  reports that he quit smoking about 22 years ago. His smoking use included cigarettes. He started smoking about 31 years ago. He smoked 0.00 packs per day for 5.00 years. He has never used smokeless tobacco. He reports current alcohol use. He reports that he does not use drugs.    Family Hx: family history includes Alzheimer Disease  (age of onset: 85) in his mother; Cancer in his father; Cerebrovascular Disease (age of onset: 50) in his father; Dementia in his mother; Hypertension in his father.. Negative for bleeding/clotting disorders. Negative for adverse anesthesia reactions.    REVIEW OF SYSTEMS: 10 point ROS neg other than the symptoms noted above in the HPI and PMH. Notables include  CONSTITUTIONAL:NEGATIVE for fever, chills, change in weight  INTEGUMENTARY/SKIN: NEGATIVE for worrisome rashes, moles or lesions  MUSCULOSKELETAL:See HPI above  Neurology: see HPI above.      EXAM:  GENERAL APPEARANCE: healthy, alert and no distress   GAIT: NORMAL  SKIN: no suspicious lesions or rashes  RESPIRATORY: No increased work of breathing.  NEURO:     strength: decreased,    thenar fasiculations: negative    Thenar atrophy:Mild.    Sensation intact in all digits,    reflexes normal in upper extremities.   PSYCH:  mentation appears normal and affect normal, not anxious.    MUSCULOSKELETAL:    RIGHT HAND/FINGERS:    Skin intact. No abnormal skin discoloration, erythema or ecchymosis.   No nail pitting or clubbing.  Normal wear pattern, color and tone.  Visible palpable cord/nodules right ring > small finger ray in the palm.   No contracture. Table top test negative.  There is mild tenderness in the areas of cord/nodules of the palm.  There is no ecchymosis.  There is no erythema of the surrounding skin.  There is no maceration of the skin.  There is no other deformity in the area.  Intact extensors. No extensor lag.      1st carpometacarpal grind: negative    Intact sensation light touch median, radial, ulnar nerves of the hand  Intact sensation to the radial and ulnar digital nerves of the fingers, as well as the finger tips.  Intact epl fpl fdp edc wrist flexion/extension biceps/triceps deltoid  Brisk capillary refill to all fingers.   Palpable radial pulse, 2+.      LEFT HAND/FINGERS:    Skin intact. No abnormal skin discoloration, erythema or  ecchymosis.   No nail pitting or clubbing.  Normal wear pattern, color and tone.  Visible palpable cord/nodules left ring < small finger ray in the palm.   No contracture. Table top test negative.  There is mild tenderness in the areas of cord/nodules of the palm.    There is no ecchymosis.  There is no erythema of the surrounding skin.  There is no maceration of the skin.  There is no other deformity in the area.  Intact extensors. No extensor lag.      1st carpometacarpal grind: negative    Intact sensation light touch median, radial, ulnar nerves of the hand  Intact sensation to the radial and ulnar digital nerves of the fingers, as well as the finger tips.  Intact epl fpl fdp edc wrist flexion/extension biceps/triceps deltoid  Brisk capillary refill to all fingers.   Palpable radial pulse, 2+.      XRAYS: 3 views bilateral hands 3/31/2021 reviewed, No obvious fracture or dislocation. No obvious bony abnormality or lesion..      ASSESSMENT/PLAN: 71yo RHD male with bilateral hand pain, dupuytrens disease without contracture.    * discussed with patient that the nodules/cords in the palms consistent with dupuytren's disease  * nonsurgical treatment at this time.    * Dupuytren's disease is essentially abnormal scar tissue of the normal fascial tissue in the palm (benign fibroproliferative disorder of the superficial palmar and digital fascia that can form subcutaneous nodules, cords, palmar pitting, webspace contractures and flexion contractures)  * progression is usually slow over years to decades.  * associated with northern  descent, males > females, increased with age  * 10-40% family history  * ring finger most commonly involved, right hand more than left hand.  * can be seen in other areas of body (plantar foot, dorsum of PIP joints, penile fibromatosis)  * has been recognized with: diabetes mellitus, smoking, copd, HIV, alcohol, seizure disorders (anti-convulsants)   * treatment is usually  observation with minimal or no contracture.  * if contracture develops and causes functional problems or pain, then treatment is either surgical (fasciotomy or fasciectomy) versus medical with enzymatic (collagenase) injections.  * also consider cortisone injections, splinting as other options for nonsurgical treatment.  * typical indications for surgery: contractures of metacarpal phalangeal joint >30 degrees or any PIP contracture.            Edson Reza M.D., M.S.  Dept. of Orthopaedic Surgery  [facility]      Again, thank you for allowing me to participate in the care of your patient.        Sincerely,        Edson Reza MD

## 2021-04-05 DIAGNOSIS — E11.43 TYPE 2 DIABETES MELLITUS WITH DIABETIC AUTONOMIC NEUROPATHY, WITHOUT LONG-TERM CURRENT USE OF INSULIN (H): ICD-10-CM

## 2021-04-05 DIAGNOSIS — R53.83 FATIGUE, UNSPECIFIED TYPE: ICD-10-CM

## 2021-04-05 DIAGNOSIS — E03.9 HYPOTHYROIDISM, UNSPECIFIED TYPE: ICD-10-CM

## 2021-04-05 DIAGNOSIS — D69.6 THROMBOCYTOPENIA (H): ICD-10-CM

## 2021-04-05 DIAGNOSIS — K74.60 HEPATIC CIRRHOSIS, UNSPECIFIED HEPATIC CIRRHOSIS TYPE, UNSPECIFIED WHETHER ASCITES PRESENT (H): ICD-10-CM

## 2021-04-05 LAB
ALBUMIN SERPL-MCNC: 3.8 G/DL (ref 3.4–5)
ALP SERPL-CCNC: 87 U/L (ref 40–150)
ALT SERPL W P-5'-P-CCNC: 78 U/L (ref 0–70)
ANION GAP SERPL CALCULATED.3IONS-SCNC: 2 MMOL/L (ref 3–14)
AST SERPL W P-5'-P-CCNC: 46 U/L (ref 0–45)
BASOPHILS # BLD AUTO: 0 10E9/L (ref 0–0.2)
BASOPHILS NFR BLD AUTO: 0.2 %
BILIRUB SERPL-MCNC: 1.5 MG/DL (ref 0.2–1.3)
BUN SERPL-MCNC: 23 MG/DL (ref 7–30)
CALCIUM SERPL-MCNC: 9.3 MG/DL (ref 8.5–10.1)
CHLORIDE SERPL-SCNC: 106 MMOL/L (ref 94–109)
CO2 SERPL-SCNC: 29 MMOL/L (ref 20–32)
CREAT SERPL-MCNC: 1.06 MG/DL (ref 0.66–1.25)
DIFFERENTIAL METHOD BLD: ABNORMAL
EOSINOPHIL # BLD AUTO: 0.1 10E9/L (ref 0–0.7)
EOSINOPHIL NFR BLD AUTO: 2.2 %
ERYTHROCYTE [DISTWIDTH] IN BLOOD BY AUTOMATED COUNT: 12.3 % (ref 10–15)
GFR SERPL CREATININE-BSD FRML MDRD: 71 ML/MIN/{1.73_M2}
GLUCOSE SERPL-MCNC: 291 MG/DL (ref 70–99)
HBA1C MFR BLD: 9.7 % (ref 0–5.6)
HCT VFR BLD AUTO: 42.1 % (ref 40–53)
HGB BLD-MCNC: 14.1 G/DL (ref 13.3–17.7)
INR PPP: 1.14 (ref 0.86–1.14)
LYMPHOCYTES # BLD AUTO: 0.7 10E9/L (ref 0.8–5.3)
LYMPHOCYTES NFR BLD AUTO: 16.1 %
MCH RBC QN AUTO: 32.9 PG (ref 26.5–33)
MCHC RBC AUTO-ENTMCNC: 33.5 G/DL (ref 31.5–36.5)
MCV RBC AUTO: 98 FL (ref 78–100)
MONOCYTES # BLD AUTO: 0.4 10E9/L (ref 0–1.3)
MONOCYTES NFR BLD AUTO: 8.5 %
NEUTROPHILS # BLD AUTO: 3.3 10E9/L (ref 1.6–8.3)
NEUTROPHILS NFR BLD AUTO: 73 %
PLATELET # BLD AUTO: 63 10E9/L (ref 150–450)
PLATELET # BLD EST: ABNORMAL 10*3/UL
POTASSIUM SERPL-SCNC: 4.3 MMOL/L (ref 3.4–5.3)
PROT SERPL-MCNC: 7.6 G/DL (ref 6.8–8.8)
RBC # BLD AUTO: 4.29 10E12/L (ref 4.4–5.9)
RBC MORPH BLD: NORMAL
SODIUM SERPL-SCNC: 137 MMOL/L (ref 133–144)
T4 FREE SERPL-MCNC: 0.98 NG/DL (ref 0.76–1.46)
TSH SERPL DL<=0.005 MIU/L-ACNC: 4.02 MU/L (ref 0.4–4)
WBC # BLD AUTO: 4.5 10E9/L (ref 4–11)

## 2021-04-05 PROCEDURE — 85025 COMPLETE CBC W/AUTO DIFF WBC: CPT | Performed by: FAMILY MEDICINE

## 2021-04-05 PROCEDURE — 84443 ASSAY THYROID STIM HORMONE: CPT | Performed by: FAMILY MEDICINE

## 2021-04-05 PROCEDURE — 36415 COLL VENOUS BLD VENIPUNCTURE: CPT | Performed by: FAMILY MEDICINE

## 2021-04-05 PROCEDURE — 85610 PROTHROMBIN TIME: CPT | Performed by: FAMILY MEDICINE

## 2021-04-05 PROCEDURE — 84439 ASSAY OF FREE THYROXINE: CPT | Performed by: FAMILY MEDICINE

## 2021-04-05 PROCEDURE — 80053 COMPREHEN METABOLIC PANEL: CPT | Performed by: FAMILY MEDICINE

## 2021-04-05 PROCEDURE — 83036 HEMOGLOBIN GLYCOSYLATED A1C: CPT | Performed by: FAMILY MEDICINE

## 2021-04-14 ENCOUNTER — OFFICE VISIT (OUTPATIENT)
Dept: FAMILY MEDICINE | Facility: CLINIC | Age: 71
End: 2021-04-14
Payer: COMMERCIAL

## 2021-04-14 VITALS
BODY MASS INDEX: 38.22 KG/M2 | WEIGHT: 274 LBS | OXYGEN SATURATION: 97 % | SYSTOLIC BLOOD PRESSURE: 128 MMHG | TEMPERATURE: 98.3 F | DIASTOLIC BLOOD PRESSURE: 58 MMHG | HEART RATE: 88 BPM

## 2021-04-14 DIAGNOSIS — E66.9 OBESITY, UNSPECIFIED OBESITY SEVERITY, UNSPECIFIED OBESITY TYPE: ICD-10-CM

## 2021-04-14 DIAGNOSIS — K74.60 CIRRHOSIS OF LIVER WITHOUT ASCITES, UNSPECIFIED HEPATIC CIRRHOSIS TYPE (H): ICD-10-CM

## 2021-04-14 DIAGNOSIS — H54.7 VISION PROBLEM: ICD-10-CM

## 2021-04-14 DIAGNOSIS — E11.43 TYPE 2 DIABETES MELLITUS WITH DIABETIC AUTONOMIC NEUROPATHY, WITHOUT LONG-TERM CURRENT USE OF INSULIN (H): ICD-10-CM

## 2021-04-14 DIAGNOSIS — R91.8 PULMONARY NODULES: Primary | ICD-10-CM

## 2021-04-14 DIAGNOSIS — E03.9 HYPOTHYROIDISM, UNSPECIFIED TYPE: ICD-10-CM

## 2021-04-14 DIAGNOSIS — K57.32 DIVERTICULITIS OF COLON: ICD-10-CM

## 2021-04-14 PROCEDURE — 99214 OFFICE O/P EST MOD 30 MIN: CPT | Performed by: FAMILY MEDICINE

## 2021-04-14 RX ORDER — LEVOTHYROXINE SODIUM 25 UG/1
TABLET ORAL
Qty: 90 TABLET | Refills: 1 | Status: SHIPPED | OUTPATIENT
Start: 2021-04-14 | End: 2021-11-12

## 2021-04-14 RX ORDER — TRAMADOL HYDROCHLORIDE 50 MG/1
TABLET ORAL
Qty: 20 TABLET | Refills: 0 | Status: SHIPPED | OUTPATIENT
Start: 2021-04-14 | End: 2021-08-26

## 2021-04-14 ASSESSMENT — PAIN SCALES - GENERAL: PAINLEVEL: NO PAIN (0)

## 2021-04-14 NOTE — PROGRESS NOTES
Lakhwinder Lemos is a 70 year old who presents for the following health issues     HPI     Follow up on stomach problems  Go over labs      Review of Systems   Cold clammy sweat at night    nightsweats    Feels bloated and pain in abd    Nauseated    Feel full quickly    Bowels soft    Low salt    Not much junk    Saw orthopedics - they said to massage the hand contractures        Objective    BP (!) 148/76 (BP Location: Left arm, Patient Position: Chair, Cuff Size: Adult Regular)   Pulse 88   Temp 98.3  F (36.8  C) (Oral)   Wt 124.3 kg (274 lb)   SpO2 97%   BMI 38.22 kg/m    Body mass index is 38.22 kg/m .  Physical Exam  Constitutional:       Appearance: He is well-developed.   HENT:      Head: Normocephalic and atraumatic.   Eyes:      Conjunctiva/sclera: Conjunctivae normal.   Neck:      Vascular: No carotid bruit.   Cardiovascular:      Rate and Rhythm: Normal rate and regular rhythm.      Heart sounds: Normal heart sounds.   Pulmonary:      Effort: Pulmonary effort is normal. No respiratory distress.      Breath sounds: Normal breath sounds.   Abdominal:      Palpations: Abdomen is soft.      Tenderness: There is no abdominal tenderness.   Neurological:      Mental Status: He is alert and oriented to person, place, and time.      Cranial Nerves: No cranial nerve deficit.   Psychiatric:         Speech: Speech normal.         Behavior: Behavior normal.         ASSESSMENT / PLAN:  (R91.8) Pulmonary nodules  (primary encounter diagnosis)  Comment: mri liver reviewed.  Prudent to do ct chest for nodules.  Patient to call and schedule.   Plan: CT Chest w/o Contrast             (K74.60) Cirrhosis of liver without ascites, unspecified hepatic cirrhosis type (H)  Comment: patient seeing liver specialist  Plan: as above     (E11.43) Type 2 diabetes mellitus with diabetic autonomic neuropathy, without long-term current use of insulin (H)  Comment: refill med but increase dose.  Quite an increase in  hemoglobin a1c.   Plan: metFORMIN (GLUCOPHAGE) 1000 MG tablet             (K57.32) Diverticulitis of colon  Comment: refill but use sparingly  Plan: traMADol (ULTRAM) 50 MG tablet             (H54.7) Vision problem  Comment: patient to schedule  Plan: EYE ADULT REFERRAL             (E03.9) Hypothyroidism, unspecified type  Comment: labs better so stay on same dose   Plan: levothyroxine (SYNTHROID/LEVOTHROID) 25 MCG         tablet             (E66.9) Obesity, unspecified obesity severity, unspecified obesity type  Comment: chronic   Plan: keep working on healthy diet/exercise and wt loss    Reviewed labs in great detail        I reviewed the patient's medications, allergies, medical history, family history, and social history.    Richi Jaramillo MD

## 2021-04-14 NOTE — PATIENT INSTRUCTIONS
Increase metformin to 1000 mg 2x daily    Other meds as is    Keep working on healthy diet/exercise and wt loss    Schedule CT scan chest    You have had prevnar 13.  Need the original pneumovax.

## 2021-04-26 DIAGNOSIS — I10 HYPERTENSION GOAL BP (BLOOD PRESSURE) < 140/90: ICD-10-CM

## 2021-04-27 RX ORDER — HYDROCHLOROTHIAZIDE 50 MG/1
TABLET ORAL
Qty: 90 TABLET | Refills: 0 | Status: SHIPPED | OUTPATIENT
Start: 2021-04-27 | End: 2021-08-13

## 2021-05-10 ENCOUNTER — TELEPHONE (OUTPATIENT)
Dept: FAMILY MEDICINE | Facility: CLINIC | Age: 71
End: 2021-05-10

## 2021-05-10 DIAGNOSIS — J01.90 ACUTE SINUSITIS WITH SYMPTOMS > 10 DAYS: ICD-10-CM

## 2021-05-17 DIAGNOSIS — E78.5 HYPERLIPIDEMIA LDL GOAL <100: ICD-10-CM

## 2021-05-17 DIAGNOSIS — K21.9 GASTROESOPHAGEAL REFLUX DISEASE WITHOUT ESOPHAGITIS: ICD-10-CM

## 2021-05-21 RX ORDER — ATORVASTATIN CALCIUM 10 MG/1
TABLET, FILM COATED ORAL
Qty: 90 TABLET | Refills: 0 | Status: SHIPPED | OUTPATIENT
Start: 2021-05-21 | End: 2021-08-13

## 2021-06-25 ENCOUNTER — TRANSFERRED RECORDS (OUTPATIENT)
Dept: HEALTH INFORMATION MANAGEMENT | Facility: CLINIC | Age: 71
End: 2021-06-25

## 2021-06-25 ENCOUNTER — APPOINTMENT (OUTPATIENT)
Dept: GENERAL RADIOLOGY | Facility: CLINIC | Age: 71
End: 2021-06-25
Attending: EMERGENCY MEDICINE
Payer: COMMERCIAL

## 2021-06-25 ENCOUNTER — HOSPITAL ENCOUNTER (EMERGENCY)
Facility: CLINIC | Age: 71
Discharge: HOME | End: 2021-06-25
Attending: EMERGENCY MEDICINE | Admitting: EMERGENCY MEDICINE
Payer: COMMERCIAL

## 2021-06-25 VITALS
HEART RATE: 64 BPM | TEMPERATURE: 98.1 F | DIASTOLIC BLOOD PRESSURE: 75 MMHG | RESPIRATION RATE: 16 BRPM | SYSTOLIC BLOOD PRESSURE: 155 MMHG | OXYGEN SATURATION: 96 %

## 2021-06-25 DIAGNOSIS — S51.812A LACERATION OF LEFT FOREARM WITHOUT COMPLICATION, INITIAL ENCOUNTER: ICD-10-CM

## 2021-06-25 PROCEDURE — 73090 X-RAY EXAM OF FOREARM: CPT | Mod: LT

## 2021-06-25 PROCEDURE — 99283 EMERGENCY DEPT VISIT LOW MDM: CPT

## 2021-06-25 PROCEDURE — 73090 X-RAY EXAM OF FOREARM: CPT | Mod: 26 | Performed by: RADIOLOGY

## 2021-06-25 PROCEDURE — 99283 EMERGENCY DEPT VISIT LOW MDM: CPT | Mod: 25 | Performed by: EMERGENCY MEDICINE

## 2021-06-25 PROCEDURE — 12002 RPR S/N/AX/GEN/TRNK2.6-7.5CM: CPT

## 2021-06-25 PROCEDURE — 12002 RPR S/N/AX/GEN/TRNK2.6-7.5CM: CPT | Performed by: EMERGENCY MEDICINE

## 2021-06-25 RX ORDER — LIDOCAINE HYDROCHLORIDE AND EPINEPHRINE 10; 10 MG/ML; UG/ML
10 INJECTION, SOLUTION INFILTRATION; PERINEURAL ONCE
Status: DISCONTINUED | OUTPATIENT
Start: 2021-06-25 | End: 2021-06-25 | Stop reason: HOSPADM

## 2021-06-25 NOTE — ED TRIAGE NOTES
Pt BIB self for a laceration on the left arm.  He states that he grabbed the door on the sliding glass door which was normally locked which slid open and he fell into the glass and cut his arm on some broken glass.  Laceration measures around 8 cm long.  Bleeding has currently stopped, but lac appears deep.

## 2021-06-25 NOTE — ED PROVIDER NOTES
History     Chief Complaint   Patient presents with     Laceration     HPI  Gucci Logan is a 71-year-old male presents ED with arm injury.  Patient reports that earlier in the night he was trying to stand from a couch and grabbed onto the handle of the sliding door.  The door was not locked as he thought it was, so it slid causing him to fall against entertainment center.  Some glass on the entertainment center broke and cut his left forearm.  Patient endorses pain in the area, denies other areas of injury.  Patient in otherwise normal state of health lately.      I have reviewed the Past Medical History with the patient, pertinent items: HTN, abdominal hernia    Review of Systems  No recent fevers, no chest pain, no abdominal pain    Physical Exam   BP: (!) 149/60  Pulse: 78  Temp: 98.1  F (36.7  C)  Resp: 14  SpO2: 95 %    Physical Exam  General: No acute distress. Appears stated age.   HENT: MMM, no oropharyngeal lesions  Eyes: PERRL, normal sclerae   Cardio: Regular rate, extremities well perfused  Resp: Normal work of breathing, normal respiratory rate  Neuro: alert and fully oriented. CN II-XII grossly intact. Grossly normal strength and sensation in all extremities. Intact median, ulnar, and radial distribution sensation and motor function in the left hand.   MSK: no deformities. Grossly normal ROM in extremities.   Integumentary/Skin: no rash, normal color. Left forearm with 6.5 cm laceration, 3.5 cm of which is superficial with 1 mm gape, the distal 3 cm are through the dermis and into just into the superficial forearm flexor muscle body; no foreign bodies visualized.   Psych: normal affect, normal behavior    ED [facility]    -Laceration Repair    Date/Time: 6/25/2021 8:24 AM  Performed by: Peter Garnica MD  Authorized by: Peter Garnica MD     LACERATION DETAILS     Location:  Shoulder/arm    Shoulder/arm location:  L  lower arm    Length (cm):  6.5    Depth (mm):  3    REPAIR TYPE:     Repair type:  Simple      EXPLORATION:     Wound extent: areolar tissue violated, fascia violated and muscle damage      Wound extent: no foreign body, no nerve damage, no tendon damage, no underlying fracture and no vascular damage      Contaminated: no      TREATMENT:     Area cleansed with:  Saline    Amount of cleaning:  Standard    Irrigation method:  Syringe    Visualized foreign bodies/material removed: no      SKIN REPAIR     Repair method:  Sutures, tissue adhesive and Steri-Strips    Suture size:  3-0    Suture material:  Nylon    Suture technique:  Simple interrupted    Number of sutures:  5    Number of Steri-Strips:  2    APPROXIMATION     Approximation:  Close    POST-PROCEDURE DETAILS     Dressing:  Antibiotic ointment and sterile dressing              Critical Care time:  none     Labs Ordered and Resulted from Time of ED Arrival Up to the Time of Departure from the ED - No data to display  Radius/Ulna XR,  PA &LAT, left   Final Result   IMPRESSION: No fracture. No radiopaque foreign body.             Assessments & Plan (with Medical Decision Making)   Patient presenting with injury to left forearm.  Exam notable for 6.5 cm laceration, 3 cm of which need sutures.  Vitals in ED unremarkable.  Nurse notes reviewed.  Tetanus immunization status is up-to-date.  X-ray without evidence of radiolucent foreign body, radiopaque foreign body.  Exam also without evidence of foreign body.  The wound was cleansed and closed as detailed above without complication.    Complete, picture is most consistent with left forearm laceration.  After counseling on the diagnosis, work-up, and treatment plan patient was discharged.  Patient was advised to follow-up with primary care in 10 days for suture removal.  Patient was advised return to ED if signs of infection, or for any other urgent or life-threatening concerns.       This part of the medical record  was transcribed by Karri Jennings, Medical Scribe, from a dictation done by Peter Garnica MD.       Final diagnoses:   Laceration of left forearm without complication, initial encounter     Discharge Medication List as of 6/25/2021  8:21 AM        I, Karri Jennings, am serving as a trained medical scribe to document services personally performed by Peter Garnica MD, based on the provider's statements to me.      I, Peter Garnica MD, was physically present and have reviewed and verified the accuracy of this note documented by Karri Jennings.   --  Peter Garnica MD   Emergency Medicine   [facility] EMERGENCY DEPARTMENT  6/25/2021     Peter Garnica MD  06/26/21 [id number]

## 2021-06-25 NOTE — DISCHARGE INSTRUCTIONS
Instructions from your doctor today:  Emergency Department testing is focused on the potential causes of your symptoms that are the most dangerous possibilities, and cannot cover every possibility. Based on the evaluation, it was deemed sufficiently safe to discharge and continue management through the clinics. Thus, follow-up is very important to assess for improvement/worsening, potential further testing, and potential treatment adjustments. If you were given opioid pain medications or other medications that can make you drowsy while in the ED, you should not drive for at least several hours and not until you feel completely back to normal.     Please make an appointment to follow up with:  - Your Primary Care Provider in 7 days for suture removal  - If you do not have a primary care provider, you can be seen in follow-up and establish care by calling any of the clinics below:     - Primary Care Center (phone: [phone])     - Primary Care / [facility] (phone: [phone])   - Have your clinic provider review the results from today's visit with you again, including any potential follow-up or additional testing that may be needed based on the results. Occasionally, incidental findings are found on later review by radiologists that may need follow-up.     Return to the Emergency Department immediately if you have spreading redness/swelling/warmth (signs of infection) form the wound, or any other urgent or potentially life-threatening concerns.

## 2021-07-07 ENCOUNTER — ANCILLARY PROCEDURE (OUTPATIENT)
Dept: CT IMAGING | Facility: CLINIC | Age: 71
End: 2021-07-07
Attending: FAMILY MEDICINE
Payer: COMMERCIAL

## 2021-07-07 ENCOUNTER — ALLIED HEALTH/NURSE VISIT (OUTPATIENT)
Dept: NURSING | Facility: CLINIC | Age: 71
End: 2021-07-07
Payer: COMMERCIAL

## 2021-07-07 DIAGNOSIS — R91.8 PULMONARY NODULES: ICD-10-CM

## 2021-07-07 DIAGNOSIS — Z48.02 ENCOUNTER FOR REMOVAL OF SUTURES: Primary | ICD-10-CM

## 2021-07-07 PROCEDURE — 71250 CT THORAX DX C-: CPT | Mod: TC | Performed by: RADIOLOGY

## 2021-07-07 PROCEDURE — 99207 PR NO CHARGE NURSE ONLY: CPT

## 2021-07-07 NOTE — PROGRESS NOTES
Gucci Logan presents to the clinic today for removal of sutures. The patient has had the sutures in place for 12 days.  There has been no history of infection or drainage.  5 sutures are seen located on the left forearm. The wound is healing well with no signs of infection.  All sutures were easily removed today.  Routine wound care discussed. The patient will follow up as needed.    Prior to removal patient was seen by Dr Solis and verbal approval given to remove stitches.     Shelbi Bruno RN

## 2021-07-16 ENCOUNTER — TRANSFERRED RECORDS (OUTPATIENT)
Dept: HEALTH INFORMATION MANAGEMENT | Facility: CLINIC | Age: 71
End: 2021-07-16

## 2021-07-21 ENCOUNTER — NURSE TRIAGE (OUTPATIENT)
Dept: FAMILY MEDICINE | Facility: CLINIC | Age: 71
End: 2021-07-21

## 2021-07-21 ENCOUNTER — HOSPITAL ENCOUNTER (EMERGENCY)
Facility: CLINIC | Age: 71
Discharge: LEFT BEFORE DISCHARGE | End: 2021-07-21
Admitting: EMERGENCY MEDICINE
Payer: COMMERCIAL

## 2021-07-21 VITALS
OXYGEN SATURATION: 95 % | SYSTOLIC BLOOD PRESSURE: 154 MMHG | RESPIRATION RATE: 14 BRPM | TEMPERATURE: 98.1 F | DIASTOLIC BLOOD PRESSURE: 69 MMHG | HEART RATE: 79 BPM

## 2021-07-21 LAB
ALBUMIN SERPL-MCNC: 3.5 G/DL (ref 3.4–5)
ALP SERPL-CCNC: 77 U/L (ref 40–150)
ALT SERPL W P-5'-P-CCNC: 54 U/L (ref 0–70)
AMMONIA PLAS-SCNC: 35 UMOL/L (ref 10–50)
ANION GAP SERPL CALCULATED.3IONS-SCNC: 7 MMOL/L (ref 3–14)
APTT PPP: 28 SECONDS (ref 22–38)
AST SERPL W P-5'-P-CCNC: 37 U/L (ref 0–45)
BASOPHILS # BLD AUTO: 0 10E3/UL (ref 0–0.2)
BASOPHILS NFR BLD AUTO: 0 %
BILIRUB SERPL-MCNC: 1.5 MG/DL (ref 0.2–1.3)
BUN SERPL-MCNC: 22 MG/DL (ref 7–30)
CALCIUM SERPL-MCNC: 9.2 MG/DL (ref 8.5–10.1)
CHLORIDE BLD-SCNC: 103 MMOL/L (ref 94–109)
CO2 SERPL-SCNC: 26 MMOL/L (ref 20–32)
CREAT SERPL-MCNC: 1.03 MG/DL (ref 0.66–1.25)
EOSINOPHIL # BLD AUTO: 0.1 10E3/UL (ref 0–0.7)
EOSINOPHIL NFR BLD AUTO: 2 %
ERYTHROCYTE [DISTWIDTH] IN BLOOD BY AUTOMATED COUNT: 13.1 % (ref 10–15)
GFR SERPL CREATININE-BSD FRML MDRD: 73 ML/MIN/1.73M2
GLUCOSE BLD-MCNC: 173 MG/DL (ref 70–99)
HCT VFR BLD AUTO: 41 % (ref 40–53)
HGB BLD-MCNC: 13.4 G/DL (ref 13.3–17.7)
HOLD SPECIMEN: NORMAL
IMM GRANULOCYTES # BLD: 0 10E3/UL
IMM GRANULOCYTES NFR BLD: 0 %
INR PPP: 1.08 (ref 0.85–1.15)
LIPASE SERPL-CCNC: 137 U/L (ref 73–393)
LYMPHOCYTES # BLD AUTO: 0.7 10E3/UL (ref 0.8–5.3)
LYMPHOCYTES NFR BLD AUTO: 14 %
MAGNESIUM SERPL-MCNC: 1.7 MG/DL (ref 1.6–2.3)
MCH RBC QN AUTO: 32.4 PG (ref 26.5–33)
MCHC RBC AUTO-ENTMCNC: 32.7 G/DL (ref 31.5–36.5)
MCV RBC AUTO: 99 FL (ref 78–100)
MONOCYTES # BLD AUTO: 0.5 10E3/UL (ref 0–1.3)
MONOCYTES NFR BLD AUTO: 10 %
NEUTROPHILS # BLD AUTO: 3.6 10E3/UL (ref 1.6–8.3)
NEUTROPHILS NFR BLD AUTO: 74 %
NRBC # BLD AUTO: 0 10E3/UL
NRBC BLD AUTO-RTO: 0 /100
PLAT MORPH BLD: NORMAL
PLATELET # BLD AUTO: 69 10E3/UL (ref 150–450)
POTASSIUM BLD-SCNC: 4.4 MMOL/L (ref 3.4–5.3)
PROT SERPL-MCNC: 7.4 G/DL (ref 6.8–8.8)
RBC # BLD AUTO: 4.14 10E6/UL (ref 4.4–5.9)
RBC MORPH BLD: NORMAL
SODIUM SERPL-SCNC: 136 MMOL/L (ref 133–144)
TSH SERPL DL<=0.005 MIU/L-ACNC: 2.68 MU/L (ref 0.4–4)
WBC # BLD AUTO: 4.8 10E3/UL (ref 4–11)

## 2021-07-21 PROCEDURE — 999N000104 HC STATISTIC NO CHARGE: Performed by: EMERGENCY MEDICINE

## 2021-07-21 PROCEDURE — 82140 ASSAY OF AMMONIA: CPT | Performed by: EMERGENCY MEDICINE

## 2021-07-21 PROCEDURE — 36415 COLL VENOUS BLD VENIPUNCTURE: CPT | Performed by: EMERGENCY MEDICINE

## 2021-07-21 PROCEDURE — 36592 COLLECT BLOOD FROM PICC: CPT | Performed by: EMERGENCY MEDICINE

## 2021-07-21 PROCEDURE — 85004 AUTOMATED DIFF WBC COUNT: CPT | Performed by: EMERGENCY MEDICINE

## 2021-07-21 PROCEDURE — 83690 ASSAY OF LIPASE: CPT | Performed by: EMERGENCY MEDICINE

## 2021-07-21 PROCEDURE — 85730 THROMBOPLASTIN TIME PARTIAL: CPT | Performed by: EMERGENCY MEDICINE

## 2021-07-21 PROCEDURE — 82040 ASSAY OF SERUM ALBUMIN: CPT | Performed by: EMERGENCY MEDICINE

## 2021-07-21 PROCEDURE — 85610 PROTHROMBIN TIME: CPT | Performed by: EMERGENCY MEDICINE

## 2021-07-21 PROCEDURE — 84443 ASSAY THYROID STIM HORMONE: CPT | Performed by: EMERGENCY MEDICINE

## 2021-07-21 PROCEDURE — 83735 ASSAY OF MAGNESIUM: CPT | Performed by: EMERGENCY MEDICINE

## 2021-07-21 NOTE — LETTER
August 10, 2021    Gucci MARTÍNEZ [address]  [address] [facility] [address]          Dear ,    We are writing to inform you of your test results.    Platelet count stable       Resulted Orders   CBC with platelets and differential   Result Value Ref Range    WBC Count 4.8 4.0 - 11.0 10e3/uL    RBC Count 4.14 (L) 4.40 - 5.90 10e6/uL    Hemoglobin 13.4 13.3 - 17.7 g/dL    Hematocrit 41.0 40.0 - 53.0 %    MCV 99 78 - 100 fL    MCH 32.4 26.5 - 33.0 pg    MCHC 32.7 31.5 - 36.5 g/dL    RDW 13.1 10.0 - 15.0 %    Platelet Count 69 (L) 150 - 450 10e3/uL    % Neutrophils 74 %    % Lymphocytes 14 %    % Monocytes 10 %    % Eosinophils 2 %    % Basophils 0 %    % Immature Granulocytes 0 %    NRBCs per 100 WBC 0 <1 /100    Absolute Neutrophils 3.6 1.6 - 8.3 10e3/uL    Absolute Lymphocytes 0.7 (L) 0.8 - 5.3 10e3/uL    Absolute Monocytes 0.5 0.0 - 1.3 10e3/uL    Absolute Eosinophils 0.1 0.0 - 0.7 10e3/uL    Absolute Basophils 0.0 0.0 - 0.2 10e3/uL    Absolute Immature Granulocytes 0.0 <=0.0 10e3/uL    Absolute NRBCs 0.0 10e3/uL   RBC and Platelet Morphology   Result Value Ref Range    Platelet Assessment  Automated Count Confirmed. Platelet morphology is normal.     Automated Count Confirmed. Platelet morphology is normal.    RBC Morphology Confirmed RBC Indices        If you have any questions or concerns, please call the clinic at the number listed above.       Sincerely,      Richi Jaramillo MD

## 2021-07-21 NOTE — ED TRIAGE NOTES
Arrives ambulatory to triage c/o concern for liver issues exacerbation. Been having sweats, nausea, chills, low energy. Has not taken temperature at home. Recent obs stay 7/8 for similar sx per pt. Afebrile in triage.

## 2021-07-21 NOTE — ED PROVIDER NOTES
[place] EMERGENCY DEPARTMENT ([facility])  7/21/21  History     Chief Complaint   Patient presents with     Fatigue     CIRRHOSIS     The history is provided by the patient and medical records.     Gucci Logan is a 71 year old male with a past medical history significant for hypertension, type 2 diabetes mellitus, liver cirrhosis, arthritis, GERD, SHONDA, thrombocytopenia, PTSD who presents to the Emergency Department for evaluation of fatigue.***        Past Medical History  Past Medical History:   Diagnosis Date     Arthritis      Esophageal reflux 3/1/2014     Hearing problem      Hiatal hernia      Hypertension      Liver disease      Obesity 1/24/2013     Obstructive sleep apnea      Sleep apnea     Advised CPAP machine. Not keen to use it.      Past Surgical History:   Procedure Laterality Date     ARTHROSCOPY KNEE RT/LT      (L) with partial medial meniscectomy     C OPEN RX ANKLE DISLOCATN+FIXATN      (R)     C SPINAL FUSION,ANT,EA ADNL LEVEL      T12 - L1     CATARACT IOL, RT/LT  Nov and Dec 2017     COLONOSCOPY N/A 4/24/2019    Procedure: COLONOSCOPY, WITH POLYPECTOMY AND BIOPSY;  Surgeon: Leventhal, Thomas Michael, MD;  Location: [facility] OR     DACRYOCYSTORHINOSTOMY Left 10/16/2018    Procedure: DACRYOCYSTORHINOSTOMY;  Surgeon: Madhu Krause MD;  Location: [facility]     ENDOSCOPIC ENDONASAL SURGERY  1994     ENDOSCOPY  2-19-15     ESOPHAGOSCOPY, GASTROSCOPY, DUODENOSCOPY (EGD), COMBINED N/A 4/24/2019    Procedure: COMBINED ESOPHAGOSCOPY, GASTROSCOPY, DUODENOSCOPY (EGD) - hold aspirin ibuprofen or naproxen for one week prior (per physician order);  Surgeon: Leventhal, Thomas Michael, MD;  Location: [facility] OR     EYE SURGERY       Hernia surgery Left 1994     NASAL/SINUS POLYPECTOMY       REPAIR PTOSIS Left 10/16/2018    Procedure: LEFT UPPER LID PTOSIS AND BILATERAL  BROW PTOSIS REPAIR WITH LEFT DACRYOCYSTORHINOSTOMY ;  Surgeon: Madhu Krause MD;  Location: [facility]     REPAIR PTOSIS BROW  Bilateral 10/16/2018    Procedure: REPAIR PTOSIS BROW;  Surgeon: Madhu Krause MD;  Location: [facility]     ROTATOR CUFF REPAIR RT/LT Right      alcohol swab prep pads  amoxicillin-clavulanate (AUGMENTIN) 875-125 MG tablet  atorvastatin (LIPITOR) 10 MG tablet  blood glucose (NO BRAND SPECIFIED) test strip  blood glucose monitoring (NO BRAND SPECIFIED) meter device kit  cholecalciferol 25 MCG (1000 UT) TABS  cyclobenzaprine (FLEXERIL) 5 MG tablet  glutamine 500 MG capsule  hydrochlorothiazide (HYDRODIURIL) 50 MG tablet  lactobacillus rhamnosus, GG, (CULTURELL) capsule  levothyroxine (SYNTHROID/LEVOTHROID) 25 MCG tablet  losartan (COZAAR) 100 MG tablet  metFORMIN (GLUCOPHAGE) 1000 MG tablet  metoprolol succinate ER (TOPROL-XL) 25 MG 24 hr tablet  Multiple Vitamins-Minerals (MULTIVITAMIN ADULT PO)  omeprazole (PRILOSEC) 20 MG DR capsule  ondansetron (ZOFRAN-ODT) 4 MG ODT tab  Pediatric Multivitamins-Fl (MULTI VIT/FL) 0.25 MG CHEW  spironolactone (ALDACTONE) 25 MG tablet  thin (NO BRAND SPECIFIED) lancets  traMADol (ULTRAM) 50 MG tablet  ursodiol (ACTIGALL) 300 MG capsule      Allergies   Allergen Reactions     Influenza Vaccines Other (See Comments)     delirium  fever       Family History  Family History   Problem Relation Age of Onset     Alzheimer Disease Mother 85     Dementia Mother      Cerebrovascular Disease Father 50     Cancer Father      Hypertension Father      Neurologic Disorder No family hx of      Diabetes No family hx of      Social History   Social History     Tobacco Use     Smoking status: Former Smoker     Packs/day: 0.00     Years: 5.00     Pack years: 0.00     Types: Cigarettes     Start date: 1990     Quit date: 1999     Years since quittin.5     Smokeless tobacco: Never Used   Substance Use Topics     Alcohol use: Yes     Comment: rare     Drug use: No      Past medical history, past surgical history, medications, allergies, family history, and social history were reviewed with  "the patient. No additional pertinent items.       Review of Systems  {Complete vs limited ROS:879720::\"A complete review of systems was performed with pertinent positives and negatives noted in the HPI, and all other systems negative.\"}    Physical Exam   BP: (!) 154/69  Pulse: 79  Temp: 98.1  F (36.7  C)  Resp: 14  SpO2: 95 %  Physical Exam  ***  ED Course      Procedures       {ED Course Selections:302623}       Results for orders placed or performed during the hospital encounter of 07/21/21   [place] Draw     Status: None ()    Narrative    The following orders were created for panel order [place] Draw.  Procedure                               Abnormality         Status                     ---------                               -----------         ------                     Extra Blue Top Tube[[id number]]                                                         Extra Red Top Tube[[id number]]                                                          Extra Green Top (Lithium...[[id number]]                                                 Extra Purple Top Tube[[id number]]                                                         Please view results for these tests on the individual orders.   CBC with platelets differential     Status: None ()    Narrative    The following orders were created for panel order CBC with platelets differential.  Procedure                               Abnormality         Status                     ---------                               -----------         ------                     CBC with platelets and d...[[id number]]                                                   Please view results for these tests on the individual orders.     Medications - No data to display     Assessments & Plan (with Medical Decision Making)   ***    I have reviewed the nursing notes. I have reviewed the findings, diagnosis, plan and need for follow up with the patient.    New Prescriptions    No medications on file "       Final diagnoses:   None       --  ***  [facility] EMERGENCY DEPARTMENT  7/21/2021

## 2021-07-21 NOTE — TELEPHONE ENCOUNTER
"Patient with Hx of Diverticulitis   Portal vein thrombus   Cirrhosis   Diabetes II    Patient reports continuous symptoms of dehydration x 5+ days and mild abdominal tenderness. See assessment below.  Marissa Alamo RN on 7/21/2021 at 11:36 AM      Reason for Disposition    Patient sounds very sick or weak to the triager    Additional Information    Negative: Shock suspected (e.g., cold/pale/clammy skin, too weak to stand, low BP, rapid pulse)    Negative: Difficult to awaken or acting confused (e.g., disoriented, slurred speech)    Negative: Sounds like a life-threatening emergency to the triager    Negative: Vomiting also present and worse than the diarrhea    Negative: Blood in stool and without diarrhea    Negative: SEVERE abdominal pain (e.g., excruciating) and present > 1 hour    Negative: SEVERE abdominal pain and age > 60    Negative: Bloody, black, or tarry bowel movements (Exception: chronic-unchanged black-grey bowel movements and is taking iron pills or Pepto-bismol)    Negative: SEVERE diarrhea (e.g., 7 or more times / day more than normal) and age > 60 years    Negative: Constant abdominal pain lasting > 2 hours    Negative: Drinking very little and has signs of dehydration (e.g., no urine > 12 hours, very dry mouth, very lightheaded)    Answer Assessment - Initial Assessment Questions  1. DIARRHEA SEVERITY: \"How bad is the diarrhea?\" \"How many extra stools have you had in the past 24 hours than normal?\"  3 moderate volume watery stools a day for 3 days or so   2. ONSET: \"When did the diarrhea begin?\"       Last Friday, today is Wednesday  3. BM CONSISTENCY: \"How loose or watery is the diarrhea?\"       Was very watery but seems to be getting more normal  4. VOMITING: \"Are you also vomiting?\" If so, ask: \"How many times in the past 24 hours?\"       Feel like vomiting, bad taste in mouth and vertigo with movement  5. ABDOMINAL PAIN: \"Are you having any abdominal pain?\" If yes: \"What does it feel like?\" " "(e.g., crampy, dull, intermittent, constant)       Patient has belly tenderness to the touch, has history of diverticulitis and liver issues  6. ABDOMINAL PAIN SEVERITY: If present, ask: \"How bad is the pain?\"  (e.g., Scale 1-10; mild, moderate, or severe)     tender to touch but tolerable   7. ORAL INTAKE: If vomiting, \"Have you been able to drink liquids?\" \"How much fluids have you had in the past 24 hours?\"      Yes, but can't seem to keep up  8. HYDRATION: \"Any signs of dehydration?\" (e.g., dry mouth [not just dry lips], too weak to stand, dizziness, new weight loss) \"When did you last urinate?\"      Headache and vertigo with movement  9. EXPOSURE: \"Have you traveled to a foreign country recently?\" \"Have you been exposed to anyone with diarrhea?\" \"Could you have eaten any food that was spoiled?\"      None  10. ANTIBIOTIC USE: \"Are you taking antibiotics now or have you taken antibiotics in the past 2 months?\"        No  11. OTHER SYMPTOMS: \"Do you have any other symptoms?\" (e.g., fever, blood in stool)        Severe sweating, and then chills    Protocols used: DIARRHEA-A-OH      "

## 2021-07-22 LAB
ATRIAL RATE - MUSE: 65 BPM
DIASTOLIC BLOOD PRESSURE - MUSE: NORMAL MMHG
INTERPRETATION ECG - MUSE: NORMAL
P AXIS - MUSE: 39 DEGREES
PR INTERVAL - MUSE: 170 MS
QRS DURATION - MUSE: 88 MS
QT - MUSE: 442 MS
QTC - MUSE: 459 MS
R AXIS - MUSE: -22 DEGREES
SYSTOLIC BLOOD PRESSURE - MUSE: NORMAL MMHG
T AXIS - MUSE: 74 DEGREES
VENTRICULAR RATE- MUSE: 65 BPM

## 2021-08-05 ENCOUNTER — OFFICE VISIT (OUTPATIENT)
Dept: GASTROENTEROLOGY | Facility: CLINIC | Age: 71
End: 2021-08-05
Attending: INTERNAL MEDICINE
Payer: COMMERCIAL

## 2021-08-05 VITALS
BODY MASS INDEX: 36.04 KG/M2 | SYSTOLIC BLOOD PRESSURE: 155 MMHG | DIASTOLIC BLOOD PRESSURE: 67 MMHG | WEIGHT: 258.4 LBS | HEART RATE: 65 BPM | OXYGEN SATURATION: 98 % | TEMPERATURE: 98.3 F

## 2021-08-05 DIAGNOSIS — K74.60 CIRRHOSIS OF LIVER WITHOUT ASCITES, UNSPECIFIED HEPATIC CIRRHOSIS TYPE (H): Primary | ICD-10-CM

## 2021-08-05 PROCEDURE — G0463 HOSPITAL OUTPT CLINIC VISIT: HCPCS

## 2021-08-05 PROCEDURE — 99214 OFFICE O/P EST MOD 30 MIN: CPT | Performed by: INTERNAL MEDICINE

## 2021-08-05 RX ORDER — ASPIRIN 81 MG/1
81 TABLET, CHEWABLE ORAL DAILY
Status: ON HOLD | COMMUNITY
End: 2023-02-23

## 2021-08-05 NOTE — NURSING NOTE
Chief Complaint   Patient presents with     RECHECK     6 mos follow up         BP (!) 155/67 (BP Location: Right arm, Patient Position: Sitting, Cuff Size: Adult Regular)   Pulse 65   Temp 98.3  F (36.8  C)   Wt 117.2 kg (258 lb 6.4 oz)   SpO2 98%   BMI 36.04 kg/m          Genaro James, EMT

## 2021-08-05 NOTE — PROGRESS NOTES
I had the pleasure of seeing Gucci Logan for followup in the Liver Clinic at the [facility] on 08/05/2021.  Mr. Logan returns for followup of cirrhosis.    For the most part, he is doing well.  He still continues to have some intermittent right upper quadrant pain.  He is convinced it is his gallbladder.  He did see Dr. Rowe who put him on ursodiol and did not feel that a cholecystectomy was necessarily indicated.  He also did have a recent episode of fevers and chills and was concerned that it might have been diverticulitis.    He denies any itching or skin rash.  He denies any fatigue.  He denies any increased abdominal girth.  He has some mild lower extremity edema.  He has not had any gastrointestinal bleeding or any overt signs of hepatic encephalopathy.    He denies any cough.  He does have some mild shortness of breath on exertion.  He denies any nausea or vomiting and did have an episode of diarrhea with fevers that has now resolved.  He states his appetite has been good.  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    Current Outpatient Medications   Medication     aspirin (ASA) 81 MG chewable tablet     atorvastatin (LIPITOR) 10 MG tablet     cholecalciferol 25 MCG (1000 UT) TABS     hydrochlorothiazide (HYDRODIURIL) 50 MG tablet     lactobacillus rhamnosus, GG, (CULTURELL) capsule     levothyroxine (SYNTHROID/LEVOTHROID) 25 MCG tablet     losartan (COZAAR) 100 MG tablet     metFORMIN (GLUCOPHAGE) 1000 MG tablet     metoprolol succinate ER (TOPROL-XL) 25 MG 24 hr tablet     Multiple Vitamins-Minerals (MULTIVITAMIN ADULT PO)     omeprazole (PRILOSEC) 20 MG DR capsule     spironolactone (ALDACTONE) 25 MG tablet     traMADol (ULTRAM) 50 MG tablet     alcohol swab prep pads     amoxicillin-clavulanate (AUGMENTIN) 875-125 MG tablet     blood glucose (NO BRAND SPECIFIED) test  strip     blood glucose monitoring (NO BRAND SPECIFIED) meter device kit     cyclobenzaprine (FLEXERIL) 5 MG tablet     glutamine 500 MG capsule     ondansetron (ZOFRAN-ODT) 4 MG ODT tab     Pediatric Multivitamins-Fl (MULTI VIT/FL) 0.25 MG CHEW     thin (NO BRAND SPECIFIED) lancets     ursodiol (ACTIGALL) 300 MG capsule     No current facility-administered medications for this visit.     BP (!) 155/67 (BP Location: Right arm, Patient Position: Sitting, Cuff Size: Adult Regular)   Pulse 65   Temp 98.3  F (36.8  C)   Wt 117.2 kg (258 lb 6.4 oz)   SpO2 98%   BMI 36.04 kg/m      PHYSICAL EXAMINATION:    GENERAL:  He looks quite well.    HEENT:  No scleral icterus or temporal muscle wasting.  CHEST:  Clear.    ABDOMEN:  No increase in girth.  No masses or tenderness to palpation is present.  His liver is 10 cm in span without left lobe enlargement.  No spleen tip is palpable.    EXTREMITIES:  No edema.    SKIN:  No stigmata of chronic liver disease.    NEUROLOGIC:  No asterixis.    His most recent laboratory tests are from 07/21 and show a white count is 4.8, hemoglobin is 13.4, and platelets are 69,000.  His sodium is 136, potassium 4.4, BUN 22, creatinine 1.03, AST 37, ALT 54, alkaline phosphatase 77, albumin 3.5 with a total protein of 7.4, and total bilirubin is 1.5.  His INR is 1.08.  He also did have a CT scan of his chest on 07/07 that showed a cirrhotic-appearing liver and cholelithiasis.    IMPRESSION:  Mr. Logan has well-compensated cirrhosis.  I will get a CT scan of his abdomen and pelvis to look for any evidence of diverticulitis and also to better evaluate his liver and gallbladder.  He did have a HIDA scan that was completely normal, and if there is any evidence of gallbladder disease, I will talk with Dr. Rowe again.  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    He is up to date with regard to vaccines and other cancer screening.   He is due for an upper GI endoscopy, which I will go ahead and order to follow up on the esophageal varices.    Thank you very much for allowing me to participate in the care of this patient.  If you have any questions regarding my recommendations, please do not hesitate to contact me.         Mata Renteria MD      Professor of Medicine  [employer] [facility] [employer]      Executive Medical Director, Solid Organ Transplant Program  [facility]

## 2021-08-05 NOTE — LETTER
8/5/2021         RE: Gucci Logan  [address]  [place] [address]        Dear Colleague,    Thank you for referring your patient, Gucci Logan, to the [facility] [place]. Please see a copy of my visit note below.    I had the pleasure of seeing Gucci Logan for followup in the Liver Clinic at the [facility] on 08/05/2021.  Mr. Logan returns for followup of cirrhosis.    For the most part, he is doing well.  He still continues to have some intermittent right upper quadrant pain.  He is convinced it is his gallbladder.  He did see Dr. Rowe who put him on ursodiol and did not feel that a cholecystectomy was necessarily indicated.  He also did have a recent episode of fevers and chills and was concerned that it might have been diverticulitis.    He denies any itching or skin rash.  He denies any fatigue.  He denies any increased abdominal girth.  He has some mild lower extremity edema.  He has not had any gastrointestinal bleeding or any overt signs of hepatic encephalopathy.    He denies any cough.  He does have some mild shortness of breath on exertion.  He denies any nausea or vomiting and did have an episode of diarrhea with fevers that has now resolved.  He states his appetite has been good.  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    Current Outpatient Medications   Medication     aspirin (ASA) 81 MG chewable tablet     atorvastatin (LIPITOR) 10 MG tablet     cholecalciferol 25 MCG (1000 UT) TABS     hydrochlorothiazide (HYDRODIURIL) 50 MG tablet     lactobacillus rhamnosus, GG, (CULTURELL) capsule     levothyroxine (SYNTHROID/LEVOTHROID) 25 MCG tablet     losartan (COZAAR) 100 MG tablet     metFORMIN (GLUCOPHAGE) 1000 MG tablet     metoprolol succinate ER (TOPROL-XL) 25 MG 24 hr tablet     Multiple Vitamins-Minerals  (MULTIVITAMIN ADULT PO)     omeprazole (PRILOSEC) 20 MG DR capsule     spironolactone (ALDACTONE) 25 MG tablet     traMADol (ULTRAM) 50 MG tablet     alcohol swab prep pads     amoxicillin-clavulanate (AUGMENTIN) 875-125 MG tablet     blood glucose (NO BRAND SPECIFIED) test strip     blood glucose monitoring (NO BRAND SPECIFIED) meter device kit     cyclobenzaprine (FLEXERIL) 5 MG tablet     glutamine 500 MG capsule     ondansetron (ZOFRAN-ODT) 4 MG ODT tab     Pediatric Multivitamins-Fl (MULTI VIT/FL) 0.25 MG CHEW     thin (NO BRAND SPECIFIED) lancets     ursodiol (ACTIGALL) 300 MG capsule     No current facility-administered medications for this visit.     BP (!) 155/67 (BP Location: Right arm, Patient Position: Sitting, Cuff Size: Adult Regular)   Pulse 65   Temp 98.3  F (36.8  C)   Wt 117.2 kg (258 lb 6.4 oz)   SpO2 98%   BMI 36.04 kg/m      PHYSICAL EXAMINATION:    GENERAL:  He looks quite well.    HEENT:  No scleral icterus or temporal muscle wasting.  CHEST:  Clear.    ABDOMEN:  No increase in girth.  No masses or tenderness to palpation is present.  His liver is 10 cm in span without left lobe enlargement.  No spleen tip is palpable.    EXTREMITIES:  No edema.    SKIN:  No stigmata of chronic liver disease.    NEUROLOGIC:  No asterixis.    His most recent laboratory tests are from 07/21 and show a white count is 4.8, hemoglobin is 13.4, and platelets are 69,000.  His sodium is 136, potassium 4.4, BUN 22, creatinine 1.03, AST 37, ALT 54, alkaline phosphatase 77, albumin 3.5 with a total protein of 7.4, and total bilirubin is 1.5.  His INR is 1.08.  He also did have a CT scan of his chest on 07/07 that showed a cirrhotic-appearing liver and cholelithiasis.    IMPRESSION:  Mr. Logan has well-compensated cirrhosis.  I will get a CT scan of his abdomen and pelvis to look for any evidence of diverticulitis and also to better evaluate his liver and gallbladder.  He did have a HIDA scan that was completely  normal, and if there is any evidence of gallbladder disease, I will talk with Dr. Rowe again.  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    He is up to date with regard to vaccines and other cancer screening.  He is due for an upper GI endoscopy, which I will go ahead and order to follow up on the esophageal varices.    Thank you very much for allowing me to participate in the care of this patient.  If you have any questions regarding my recommendations, please do not hesitate to contact me.         Mata Renteria MD      Professor of Medicine  [employer]      Executive Medical Director, Solid Organ Transplant Program  [facility]             Again, thank you for allowing me to participate in the care of your patient.        Sincerely,        Mata Renteria MD

## 2021-08-06 ENCOUNTER — OFFICE VISIT (OUTPATIENT)
Dept: PODIATRY | Facility: CLINIC | Age: 71
End: 2021-08-06
Payer: COMMERCIAL

## 2021-08-06 ENCOUNTER — ANCILLARY PROCEDURE (OUTPATIENT)
Dept: GENERAL RADIOLOGY | Facility: CLINIC | Age: 71
End: 2021-08-06
Attending: PODIATRIST
Payer: COMMERCIAL

## 2021-08-06 VITALS — BODY MASS INDEX: 35.98 KG/M2 | WEIGHT: 258 LBS

## 2021-08-06 DIAGNOSIS — M79.671 RIGHT FOOT PAIN: Primary | ICD-10-CM

## 2021-08-06 DIAGNOSIS — M79.671 RIGHT FOOT PAIN: ICD-10-CM

## 2021-08-06 PROCEDURE — 99203 OFFICE O/P NEW LOW 30 MIN: CPT | Performed by: PODIATRIST

## 2021-08-06 PROCEDURE — 73630 X-RAY EXAM OF FOOT: CPT | Mod: RT | Performed by: RADIOLOGY

## 2021-08-06 NOTE — PATIENT INSTRUCTIONS
We wish you continued good healing. If you have any questions or concerns, please do not hesitate to contact us at [phone]    [employer] (secure e-mail communication and access to your chart) to send a message or to make an appointment.    Please remember to call and schedule a follow up appointment if one was recommended at your earliest convenience.     +++OF MARCH 2020+++ LOCATION AND HOURS HAVE CHANGED    PLEASE CALL CLINICS TO VERIFY DAYS AND TIMES  PODIATRY CLINIC HOURS  TELEPHONE NUMBER    Dr[facility] Sc[facility]        Clinics:  [facility]   Tuesday 1PM-6PM  [place]  Wednesday 745AM-330PM  [place]  Thursday/Friday 745AM-230PM  [place] APPOINTMENTS  [phone]    [place] APPOINTMENTS  [phone]          If you need a medication refill, please contact us you may need lab work and/or a follow up visit prior to your refill (i.e. Antifungal medications).    If MRI needed please call Imaging at [phone] or [phone]    HOW DO I GET MY KNEE SCOOTER? Knee scooters can be picked up at ANY Medical Supply stores with your knee scooter Prescription.  OR    Bring your signed prescription to an [facility] showroom.

## 2021-08-06 NOTE — PROGRESS NOTES
Subjective:    Pt is seen today as a new pt referral with the c/c of painful right foot.  This has been symptomatic for the past several years.  Patient had surgery at [facility] by another physician at that time.  States second toe hitting third toe.  If he wears a spacer it is better.  Has a history of ankle fracture on this side.  States he cannot lift his foot back as much.  Has history of knee surgery and nerve was injured and he has weakness on this leg.  Patient is retired.  History of alcoholism and cirrhosis of the liver.  Patient has diabetes.      ROS:  A 10-point review of systems was performed and is positive for that noted in the HPI and noted above.  All other areas are negative.        Allergies   Allergen Reactions     Influenza Vaccines Other (See Comments)     delirium  fever         Current Outpatient Medications   Medication Sig Dispense Refill     alcohol swab prep pads Use to swab area of injection/jose antonio as directed. (Patient not taking: Reported on 8/5/2021) 100 each 3     aspirin (ASA) 81 MG chewable tablet Take 81 mg by mouth daily       atorvastatin (LIPITOR) 10 MG tablet Take 1 tablet by mouth once daily 90 tablet 0     blood glucose (NO BRAND SPECIFIED) test strip Use to test blood sugar 1 times daily or as directed. To accompany: Blood Glucose Monitor Brands: per insurance. (Patient not taking: Reported on 8/5/2021) 100 strip 6     blood glucose monitoring (NO BRAND SPECIFIED) meter device kit Use to test blood sugar 1 times daily or as directed. Preferred blood glucose meter OR supplies to accompany: Blood Glucose Monitor Brands: per insurance. (Patient not taking: Reported on 8/5/2021) 1 kit 0     cholecalciferol 25 MCG (1000 UT) TABS Take 1,000 Units by mouth daily 2 tabs daily       cyclobenzaprine (FLEXERIL) 5 MG tablet Take 1 tablet (5 mg) by mouth 3 times daily as needed for muscle spasms (Patient not taking: Reported on 8/5/2021) 20 tablet 0     glutamine 500 MG capsule Take 500 mg  by mouth daily (Patient not taking: Reported on 8/5/2021)       hydrochlorothiazide (HYDRODIURIL) 50 MG tablet Take 1 tablet by mouth once daily 90 tablet 0     lactobacillus rhamnosus, GG, (CULTURELL) capsule Take 1 capsule by mouth 2 times daily       levothyroxine (SYNTHROID/LEVOTHROID) 25 MCG tablet TAKE 1 TABLET (25 MCG) BY MOUTH ONCE DAILY 90 tablet 1     losartan (COZAAR) 100 MG tablet Take 1 tablet by mouth once daily 90 tablet 1     metFORMIN (GLUCOPHAGE) 1000 MG tablet Take 1 tablet (1,000 mg) by mouth 2 times daily (with meals) (Patient taking differently: Take 1,000 mg by mouth three times a week ) 180 tablet 1     metoprolol succinate ER (TOPROL-XL) 25 MG 24 hr tablet 75 mg ( 3 tabs ) po daily 270 tablet 1     Multiple Vitamins-Minerals (MULTIVITAMIN ADULT PO) Take 1 tablet by mouth daily        omeprazole (PRILOSEC) 20 MG DR capsule TAKE 1 CAPSULE BY MOUTH ONCE DAILY 30 TO 60 MINUTES BEFORE A MEAL 90 capsule 0     ondansetron (ZOFRAN-ODT) 4 MG ODT tab Take 1 tablet (4 mg) by mouth every 6 hours as needed for nausea or vomiting (Patient not taking: Reported on 8/5/2021) 10 tablet 0     spironolactone (ALDACTONE) 25 MG tablet Take 1 tablet (25 mg) by mouth daily 90 tablet 1     thin (NO BRAND SPECIFIED) lancets Use with lanceting device. To accompany: Blood Glucose Monitor Brands: per insurance. (Patient not taking: Reported on 8/5/2021) 100 each 3     traMADol (ULTRAM) 50 MG tablet TAKE 1 TABLET BY MOUTH EVERY 6 HOURS AS NEEDED FOR MODERATE PAIN 20 tablet 0     ursodiol (ACTIGALL) 300 MG capsule Take 1 capsule (300 mg) by mouth 2 times daily (Patient not taking: Reported on 8/5/2021) 60 capsule 3       Patient Active Problem List   Diagnosis     Advanced directives, counseling/discussion     CARDIOVASCULAR SCREENING; LDL GOAL LESS THAN 130     Hypertension goal BP (blood pressure) < 140/90     Obesity, unspecified obesity severity, unspecified obesity type     Gastroesophageal reflux disease,  esophagitis presence not specified     Hyperlipidemia LDL goal <100     Impaired fasting glucose     Obesity (BMI 35.0-39.9) with comorbidity (H)     Diabetes mellitus, type 2 (H)     Type 2 diabetes mellitus with diabetic autonomic neuropathy, without long-term current use of insulin (H)     Other cirrhosis of liver (H)     Abdominal pain     Portal vein thrombosis     Vitamin D deficiency     Thrombocytopenia (H)     Splenomegaly     Osteoarthrosis     SHONDA (obstructive sleep apnea)     Lymphadenopathy     Left ventricular hypertrophy     Jaundice     Chronic hepatitis C virus infection (H)       Past Medical History:   Diagnosis Date     Arthritis      Esophageal reflux 3/1/2014     Hearing problem      Hiatal hernia      Hypertension      Liver disease      Obesity 1/24/2013     Obstructive sleep apnea      Sleep apnea     Advised CPAP machine. Not keen to use it.        Past Surgical History:   Procedure Laterality Date     ARTHROSCOPY KNEE RT/LT      (L) with partial medial meniscectomy     C OPEN RX ANKLE DISLOCATN+FIXATN      (R)     C SPINAL FUSION,ANT,EA ADNL LEVEL      T12 - L1     CATARACT IOL, RT/LT  Nov and Dec 2017     COLONOSCOPY N/A 4/24/2019    Procedure: COLONOSCOPY, WITH POLYPECTOMY AND BIOPSY;  Surgeon: Leventhal, Thomas Michael, MD;  [facility]     DACRYOCYSTORHINOSTOMY Left 10/16/2018    Procedure: DACRYOCYSTORHINOSTOMY;  Surgeon: Madhu Krause MD;  Location: [facility]     ENDOSCOPIC ENDONASAL SURGERY  1994     ENDOSCOPY  2-19-15     ESOPHAGOSCOPY, GASTROSCOPY, DUODENOSCOPY (EGD), COMBINED N/A 4/24/2019    Procedure: COMBINED ESOPHAGOSCOPY, GASTROSCOPY, DUODENOSCOPY (EGD) - hold aspirin ibuprofen or naproxen for one week prior (per physician order);  Surgeon: Leventhal, Thomas Michael, MD;  Location: [facility]     EYE SURGERY       Hernia surgery Left 1994     NASAL/SINUS POLYPECTOMY       REPAIR PTOSIS Left 10/16/2018    Procedure: LEFT UPPER LID PTOSIS AND BILATERAL  BROW PTOSIS REPAIR  WITH LEFT DACRYOCYSTORHINOSTOMY ;  Surgeon: Madhu Krause MD;  Location: [facility]     REPAIR PTOSIS BROW Bilateral 10/16/2018    Procedure: REPAIR PTOSIS BROW;  Surgeon: Madhu Krause MD;  [facility]     ROTATOR CUFF REPAIR RT/LT Right        Family History   Problem Relation Age of Onset     Alzheimer Disease Mother 85     Dementia Mother      Cerebrovascular Disease Father 50     Cancer Father      Hypertension Father      Neurologic Disorder No family hx of      Diabetes No family hx of        Social History     Tobacco Use     Smoking status: Former Smoker     Packs/day: 0.00     Years: 5.00     Pack years: 0.00     Types: Cigarettes     Start date: 1990     Quit date: 1999     Years since quittin.6     Smokeless tobacco: Never Used   Substance Use Topics     Alcohol use: Yes     Comment: rare       Objective:    O:  Wt 117 kg (258 lb)   BMI 35.98 kg/m  .      Constitutional/ general:  Pt is in no apparent distress, appears well-nourished.  Cooperative with history and physical exam.     Psych:  The patient answered questions appropriately.  Normal affect.  Seems to have reasonable expectations, in terms of treatment.     Eyes:  Visual scanning/ tracking without deficit.     Ears:  Response to auditory stimuli is normal.  negative hearing aid devices.  Auricles in proper alignment.     Lymphatic:  Popliteal lymph nodes not enlarged.     Lungs:  Non labored breathing, non labored speech. No cough.  No audible wheezing. Even, quiet breathing.       Vascular:  positive pedal pulses bilaterally for both the DP and PT arteries.  CFT < 3 sec.  positive ankle edema.  positive pedal hair growth.    Neuro:  Alert and oriented x 3. Antalgic gait.      Derm: Normal texture and turgor.  No erythema, ecchymosis, or cyanosis.      Musculoskeletal:    Lower extremity muscle strength is normal.  He has an antalgic gait.  Dorsiflexion approximately 30% normal right foot.  Right second toe slightly  elevated.  Medial deviation of toes 345.  No callusing erythema or breakdown at this time.    X-ray three views foot shows signs consistent with above digital findings.    Assessment: Toe pain bilaterally    Plan:  Xray taken of foot.  Explained to patient that some of the rubbing between his second and third toe is from the medial deviation of the third toe.  We pointed this out on the x-ray.  Discussed he actually has some medial deviation of the second toe as well.  He has an antalgic gait which puts more stress on this foot.  Discussed if we did surgery on it would be guaranteed mediocre outcome.  He is wearing a very flimsy shoe.  Discussed very stiff shoe to be worn at all times with inside and outside made suggestions.  Discussed pads but he states hard to bend over to do this.  Return to clinic prn.    Scooter Richards DPM, FACFAS

## 2021-08-06 NOTE — LETTER
8/6/2021         RE: Gucci Logan  [address]  [place] [facility] [address]        Dear Colleague,    Thank you for referring your patient, Gucci Logan, to the [facility]. Please see a copy of my visit note below.     Subjective:    Pt is seen today as a new pt referral with the c/c of painful right foot.  This has been symptomatic for the past several years.  Patient had surgery at [facility] by another physician at that time.  States second toe hitting third toe.  If he wears a spacer it is better.  Has a history of ankle fracture on this side.  States he cannot lift his foot back as much.  Has history of knee surgery and nerve was injured and he has weakness on this leg.  Patient is retired.  History of alcoholism and cirrhosis of the liver.  Patient has diabetes.      ROS:  A 10-point review of systems was performed and is positive for that noted in the HPI and noted above.  All other areas are negative.        Allergies   Allergen Reactions     Influenza Vaccines Other (See Comments)     delirium  fever         Current Outpatient Medications   Medication Sig Dispense Refill     alcohol swab prep pads Use to swab area of injection/jose antonio as directed. (Patient not taking: Reported on 8/5/2021) 100 each 3     aspirin (ASA) 81 MG chewable tablet Take 81 mg by mouth daily       atorvastatin (LIPITOR) 10 MG tablet Take 1 tablet by mouth once daily 90 tablet 0     blood glucose (NO BRAND SPECIFIED) test strip Use to test blood sugar 1 times daily or as directed. To accompany: Blood Glucose Monitor Brands: per insurance. (Patient not taking: Reported on 8/5/2021) 100 strip 6     blood glucose monitoring (NO BRAND SPECIFIED) meter device kit Use to test blood sugar 1 times daily or as directed. Preferred blood glucose meter OR supplies to accompany: Blood Glucose Monitor Brands: per insurance. (Patient not taking: Reported on 8/5/2021) 1 kit 0     cholecalciferol 25 MCG (1000 UT) TABS Take  1,000 Units by mouth daily 2 tabs daily       cyclobenzaprine (FLEXERIL) 5 MG tablet Take 1 tablet (5 mg) by mouth 3 times daily as needed for muscle spasms (Patient not taking: Reported on 8/5/2021) 20 tablet 0     glutamine 500 MG capsule Take 500 mg by mouth daily (Patient not taking: Reported on 8/5/2021)       hydrochlorothiazide (HYDRODIURIL) 50 MG tablet Take 1 tablet by mouth once daily 90 tablet 0     lactobacillus rhamnosus, GG, (CULTURELL) capsule Take 1 capsule by mouth 2 times daily       levothyroxine (SYNTHROID/LEVOTHROID) 25 MCG tablet TAKE 1 TABLET (25 MCG) BY MOUTH ONCE DAILY 90 tablet 1     losartan (COZAAR) 100 MG tablet Take 1 tablet by mouth once daily 90 tablet 1     metFORMIN (GLUCOPHAGE) 1000 MG tablet Take 1 tablet (1,000 mg) by mouth 2 times daily (with meals) (Patient taking differently: Take 1,000 mg by mouth three times a week ) 180 tablet 1     metoprolol succinate ER (TOPROL-XL) 25 MG 24 hr tablet 75 mg ( 3 tabs ) po daily 270 tablet 1     Multiple Vitamins-Minerals (MULTIVITAMIN ADULT PO) Take 1 tablet by mouth daily        omeprazole (PRILOSEC) 20 MG DR capsule TAKE 1 CAPSULE BY MOUTH ONCE DAILY 30 TO 60 MINUTES BEFORE A MEAL 90 capsule 0     ondansetron (ZOFRAN-ODT) 4 MG ODT tab Take 1 tablet (4 mg) by mouth every 6 hours as needed for nausea or vomiting (Patient not taking: Reported on 8/5/2021) 10 tablet 0     spironolactone (ALDACTONE) 25 MG tablet Take 1 tablet (25 mg) by mouth daily 90 tablet 1     thin (NO BRAND SPECIFIED) lancets Use with lanceting device. To accompany: Blood Glucose Monitor Brands: per insurance. (Patient not taking: Reported on 8/5/2021) 100 each 3     traMADol (ULTRAM) 50 MG tablet TAKE 1 TABLET BY MOUTH EVERY 6 HOURS AS NEEDED FOR MODERATE PAIN 20 tablet 0     ursodiol (ACTIGALL) 300 MG capsule Take 1 capsule (300 mg) by mouth 2 times daily (Patient not taking: Reported on 8/5/2021) 60 capsule 3       Patient Active Problem List   Diagnosis     Advanced  directives, counseling/discussion     CARDIOVASCULAR SCREENING; LDL GOAL LESS THAN 130     Hypertension goal BP (blood pressure) < 140/90     Obesity, unspecified obesity severity, unspecified obesity type     Gastroesophageal reflux disease, esophagitis presence not specified     Hyperlipidemia LDL goal <100     Impaired fasting glucose     Obesity (BMI 35.0-39.9) with comorbidity (H)     Diabetes mellitus, type 2 (H)     Type 2 diabetes mellitus with diabetic autonomic neuropathy, without long-term current use of insulin (H)     Other cirrhosis of liver (H)     Abdominal pain     Portal vein thrombosis     Vitamin D deficiency     Thrombocytopenia (H)     Splenomegaly     Osteoarthrosis     SHONDA (obstructive sleep apnea)     Lymphadenopathy     Left ventricular hypertrophy     Jaundice     Chronic hepatitis C virus infection (H)       Past Medical History:   Diagnosis Date     Arthritis      Esophageal reflux 3/1/2014     Hearing problem      Hiatal hernia      Hypertension      Liver disease      Obesity 1/24/2013     Obstructive sleep apnea      Sleep apnea     Advised CPAP machine. Not keen to use it.        Past Surgical History:   Procedure Laterality Date     ARTHROSCOPY KNEE RT/LT      (L) with partial medial meniscectomy     C OPEN RX ANKLE DISLOCATN+FIXATN      (R)     C SPINAL FUSION,ANT,EA ADNL LEVEL      T12 - L1     CATARACT IOL, RT/LT  Nov and Dec 2017     COLONOSCOPY N/A 4/24/2019    Procedure: COLONOSCOPY, WITH POLYPECTOMY AND BIOPSY;  Surgeon: Leventhal, Thomas Michael, MD;  [facility]     DACRYOCYSTORHINOSTOMY Left 10/16/2018    Procedure: DACRYOCYSTORHINOSTOMY;  Surgeon: Madhu Krause MD;  Location: [facility]     ENDOSCOPIC ENDONASAL SURGERY  1994     ENDOSCOPY  2-19-15     ESOPHAGOSCOPY, GASTROSCOPY, DUODENOSCOPY (EGD), COMBINED N/A 4/24/2019    Procedure: COMBINED ESOPHAGOSCOPY, GASTROSCOPY, DUODENOSCOPY (EGD) - hold aspirin ibuprofen or naproxen for one week prior (per physician  order);  Surgeon: Leventhal, Thomas Michael, MD;  [facility]     EYE SURGERY       Hernia surgery Left      NASAL/SINUS POLYPECTOMY       REPAIR PTOSIS Left 10/16/2018    Procedure: LEFT UPPER LID PTOSIS AND BILATERAL  BROW PTOSIS REPAIR WITH LEFT DACRYOCYSTORHINOSTOMY ;  Surgeon: Madhu Krause MD;  Location: [facility]     REPAIR PTOSIS BROW Bilateral 10/16/2018    Procedure: REPAIR PTOSIS BROW;  Surgeon: Madhu Krause MD;  Location: [facility]     ROTATOR CUFF REPAIR RT/LT Right        Family History   Problem Relation Age of Onset     Alzheimer Disease Mother 85     Dementia Mother      Cerebrovascular Disease Father 50     Cancer Father      Hypertension Father      Neurologic Disorder No family hx of      Diabetes No family hx of        Social History     Tobacco Use     Smoking status: Former Smoker     Packs/day: 0.00     Years: 5.00     Pack years: 0.00     Types: Cigarettes     Start date: 1990     Quit date: 1999     Years since quittin.6     Smokeless tobacco: Never Used   Substance Use Topics     Alcohol use: Yes     Comment: rare       Objective:    O:  Wt 117 kg (258 lb)   BMI 35.98 kg/m  .      Constitutional/ general:  Pt is in no apparent distress, appears well-nourished.  Cooperative with history and physical exam.     Psych:  The patient answered questions appropriately.  Normal affect.  Seems to have reasonable expectations, in terms of treatment.     Eyes:  Visual scanning/ tracking without deficit.     Ears:  Response to auditory stimuli is normal.  negative hearing aid devices.  Auricles in proper alignment.     Lymphatic:  Popliteal lymph nodes not enlarged.     Lungs:  Non labored breathing, non labored speech. No cough.  No audible wheezing. Even, quiet breathing.       Vascular:  positive pedal pulses bilaterally for both the DP and PT arteries.  CFT < 3 sec.  positive ankle edema.  positive pedal hair growth.    Neuro:  Alert and oriented x 3. Antalgic gait.       Derm: Normal texture and turgor.  No erythema, ecchymosis, or cyanosis.      Musculoskeletal:    Lower extremity muscle strength is normal.  He has an antalgic gait.  Dorsiflexion approximately 30% normal right foot.  Right second toe slightly elevated.  Medial deviation of toes 345.  No callusing erythema or breakdown at this time.    X-ray three views foot shows signs consistent with above digital findings.    Assessment: Toe pain bilaterally    Plan:  Xray taken of foot.  Explained to patient that some of the rubbing between his second and third toe is from the medial deviation of the third toe.  We pointed this out on the x-ray.  Discussed he actually has some medial deviation of the second toe as well.  He has an antalgic gait which puts more stress on this foot.  Discussed if we did surgery on it would be guaranteed mediocre outcome.  He is wearing a very flimsy shoe.  Discussed very stiff shoe to be worn at all times with inside and outside made suggestions.  Discussed pads but he states hard to bend over to do this.  Return to clinic prn.    Scooter Richards DPM, FACFAS            Again, thank you for allowing me to participate in the care of your patient.        Sincerely,        Scooter Richards DPM

## 2021-08-09 ENCOUNTER — ANCILLARY PROCEDURE (OUTPATIENT)
Dept: CT IMAGING | Facility: CLINIC | Age: 71
End: 2021-08-09
Attending: INTERNAL MEDICINE
Payer: COMMERCIAL

## 2021-08-09 ENCOUNTER — ANCILLARY PROCEDURE (OUTPATIENT)
Dept: ULTRASOUND IMAGING | Facility: CLINIC | Age: 71
End: 2021-08-09
Attending: INTERNAL MEDICINE
Payer: COMMERCIAL

## 2021-08-09 DIAGNOSIS — K74.60 CIRRHOSIS OF LIVER WITHOUT ASCITES, UNSPECIFIED HEPATIC CIRRHOSIS TYPE (H): ICD-10-CM

## 2021-08-09 PROCEDURE — 74177 CT ABD & PELVIS W/CONTRAST: CPT | Performed by: RADIOLOGY

## 2021-08-09 PROCEDURE — 76700 US EXAM ABDOM COMPLETE: CPT | Performed by: STUDENT IN AN ORGANIZED HEALTH CARE EDUCATION/TRAINING PROGRAM

## 2021-08-09 RX ORDER — IOPAMIDOL 755 MG/ML
135 INJECTION, SOLUTION INTRAVASCULAR ONCE
Status: COMPLETED | OUTPATIENT
Start: 2021-08-09 | End: 2021-08-09

## 2021-08-09 RX ADMIN — IOPAMIDOL 135 ML: 755 INJECTION, SOLUTION INTRAVASCULAR at 10:52

## 2021-08-11 ENCOUNTER — TELEPHONE (OUTPATIENT)
Dept: FAMILY MEDICINE | Facility: CLINIC | Age: 71
End: 2021-08-11
Payer: COMMERCIAL

## 2021-08-11 DIAGNOSIS — F41.9 ANXIETY: Primary | ICD-10-CM

## 2021-08-11 DIAGNOSIS — M79.643 PAIN OF HAND, UNSPECIFIED LATERALITY: ICD-10-CM

## 2021-08-11 NOTE — TELEPHONE ENCOUNTER
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 Pt's BP was elevated for a sustained period of time he said and had his BP meds increased. Pt is going to be returning to court next month with the same  and is worried about his health. He would like to see a mental health provider ASAP to discuss his concerns before his court case. Pt is unsure where to go where he can be \"seen in the next week or so\". Writer pended mental health referral for review and signature.    Pt also wanted to let Dr. Jaramillo know he recently saw GI and Podiatry over the past week. Has also seen ortho recently for arm injury and also back in March for hand pain. Pt was recommended to have massage for his hand and is hoping he can do that with the podiatrist. Writer informed podiatry works with feet and he would likely need another referral for hand PT. Will confirm with PCP.    Pt asked to be contacted via email, he does not have a VM set up ([email or web address]). Writer informed we might have to call multiple times if unable to reach him, we are unable to send emails. Pt is not on [employer] and does not want to be active on it. Also gave clinic number he can call for follow up.    Nerissa AVALOS RN, BSN  [facility]           "

## 2021-08-11 NOTE — TELEPHONE ENCOUNTER
Spoke with pt and gave scheduling phone numbers for both referrals placed:    Mental health referral: [phone]    Hand PT: [phone]     Pt also asked about a bill he received from the ER and writer gave [facility] billing number: [phone].    Pt also asked for number to reach the nursing staff with questions at [facility]. Gave number [phone].    Nerissa AVALOS RN, BSN  [facility]

## 2021-08-11 NOTE — TELEPHONE ENCOUNTER
I okayed the mental health referral and also did one for physical therapy for the hands    Please inform patient    Richi Jaramillo MD

## 2021-08-19 ENCOUNTER — TELEPHONE (OUTPATIENT)
Dept: FAMILY MEDICINE | Facility: CLINIC | Age: 71
End: 2021-08-19

## 2021-08-19 DIAGNOSIS — M72.0 DUPUYTREN'S DISEASE OF PALM OF BOTH HANDS: Primary | ICD-10-CM

## 2021-08-19 NOTE — TELEPHONE ENCOUNTER
Pt saw Dr. Reza 3/31/21 with ortho and Dr. Richards 8/6/21 with podiatry. Home care RN asking for order to do massage therapy for his dupuytren's disease.    Routing to specialty care team to assist with referral request.    Nerissa AVALOS RN, BSN  [facility]

## 2021-08-19 NOTE — TELEPHONE ENCOUNTER
Reason for Call: Request for an order or referral:    Order or referral being requested: home care massage therapy    Date needed: as soon as possible    Has the patient been seen by the PCP for this problem? YES    Additional comments: patient currently has a nurse who was recommended by a podiatrist who sees patient for foot care every 3-4 weeks.  She would be able to do the massage therapy at patient's home for his dupuytren's disease.  Patient saw Dr. Reza for this, as well [facility] for a tendon laceration.    Phone number Patient can be reached at:  Cell number on file:    Telephone Information:   Mobile [phone]     Best Time:  anytime    Can we leave a detailed message on this number?  No voice mail set up    Call taken on 8/19/2021 at 10:29 AM by Toya Cronin

## 2021-08-19 NOTE — TELEPHONE ENCOUNTER
Spoke to pt. Ordered pended. Will send to pt email once complete    Gala DANGELO RN Specialty Triage 8/19/2021 2:02 PM

## 2021-08-26 ENCOUNTER — OFFICE VISIT (OUTPATIENT)
Dept: FAMILY MEDICINE | Facility: CLINIC | Age: 71
End: 2021-08-26
Payer: COMMERCIAL

## 2021-08-26 ENCOUNTER — TELEPHONE (OUTPATIENT)
Dept: ORTHOPEDICS | Facility: CLINIC | Age: 71
End: 2021-08-26

## 2021-08-26 ENCOUNTER — ANCILLARY PROCEDURE (OUTPATIENT)
Dept: GENERAL RADIOLOGY | Facility: CLINIC | Age: 71
End: 2021-08-26
Attending: PHYSICIAN ASSISTANT
Payer: COMMERCIAL

## 2021-08-26 ENCOUNTER — TELEPHONE (OUTPATIENT)
Dept: PODIATRY | Facility: CLINIC | Age: 71
End: 2021-08-26

## 2021-08-26 VITALS
TEMPERATURE: 97.9 F | DIASTOLIC BLOOD PRESSURE: 71 MMHG | WEIGHT: 257 LBS | OXYGEN SATURATION: 95 % | HEIGHT: 71 IN | HEART RATE: 62 BPM | SYSTOLIC BLOOD PRESSURE: 158 MMHG | BODY MASS INDEX: 35.98 KG/M2

## 2021-08-26 DIAGNOSIS — M79.671 RIGHT FOOT PAIN: ICD-10-CM

## 2021-08-26 DIAGNOSIS — S20.212A CHEST WALL CONTUSION, LEFT, INITIAL ENCOUNTER: ICD-10-CM

## 2021-08-26 DIAGNOSIS — S20.212A CHEST WALL CONTUSION, LEFT, INITIAL ENCOUNTER: Primary | ICD-10-CM

## 2021-08-26 DIAGNOSIS — E11.43 TYPE 2 DIABETES MELLITUS WITH DIABETIC AUTONOMIC NEUROPATHY, WITHOUT LONG-TERM CURRENT USE OF INSULIN (H): Primary | ICD-10-CM

## 2021-08-26 DIAGNOSIS — I10 HYPERTENSION GOAL BP (BLOOD PRESSURE) < 140/90: ICD-10-CM

## 2021-08-26 PROCEDURE — 71101 X-RAY EXAM UNILAT RIBS/CHEST: CPT | Mod: LT | Performed by: RADIOLOGY

## 2021-08-26 PROCEDURE — 99214 OFFICE O/P EST MOD 30 MIN: CPT | Performed by: PHYSICIAN ASSISTANT

## 2021-08-26 PROCEDURE — 82043 UR ALBUMIN QUANTITATIVE: CPT | Performed by: PHYSICIAN ASSISTANT

## 2021-08-26 RX ORDER — MULTIPLE VITAMINS W/ MINERALS TAB 9MG-400MCG
1 TAB ORAL DAILY
COMMUNITY

## 2021-08-26 RX ORDER — SPIRONOLACTONE 25 MG/1
25 TABLET ORAL DAILY
Qty: 90 TABLET | Refills: 1 | Status: SHIPPED | OUTPATIENT
Start: 2021-08-26 | End: 2022-02-08

## 2021-08-26 ASSESSMENT — MIFFLIN-ST. JEOR: SCORE: 1947.87

## 2021-08-26 NOTE — LETTER
August 27, 2021      Gucci Logan  [address] [facility] [address]        Dear ,    We are writing to inform you of your test results.    Negative X ray report.  Follow up if symptoms persist or as previously directed.      Resulted Orders   XR Ribs & Chest Left G/E 3 Views    Narrative    XR LEFT RIBS AND CHEST THREE VIEWS   8/26/2021 11:49 AM     HISTORY: Left chest wall pain after contusion.    COMPARISON: Chest x-ray from 1/7/2020.      Impression    IMPRESSION: Chest x-ray is unremarkable. No pneumothorax or pleural  fluid. No definite rib fracture. Evidence of previous lumbar spine  instrumented surgery.    SUPA MILLER MD         SYSTEM ID:  [id number]       If you have any questions or concerns, please call the clinic at the number listed above.       Sincerely,      Jada Miranda PA-C/felicia

## 2021-08-26 NOTE — TELEPHONE ENCOUNTER
Pt present to clinic for unrelated specialty request. During conversation pt requested to have order placed for custom shoes with built in lift of the R foot. Advised pt this would be put through as a request to Dr KATHYA DANGELO RN Specialty Triage 8/26/2021 10:47 AM

## 2021-08-26 NOTE — PROGRESS NOTES
"    Assessment & Plan     Chest wall contusion, left, initial encounter  - acetaminophen-codeine (TYLENOL #3) 300-30 MG tablet; Take 1 tablet by mouth every 6 hours as needed for severe pain  - XR Ribs & Chest Left G/E 3 Views; Future    Hypertension goal BP (blood pressure) < 140/90  - spironolactone (ALDACTONE) 25 MG tablet; Take 1 tablet (25 mg) by mouth daily  - Albumin Random Urine Quantitative with Creat Ratio; Future      Return in about 4 weeks (around 9/23/2021) for follow up if symptoms persist, change or worsen.    Jada Miranda PA-C  [facility] EL Lemos is a 71 year old who presents for the following health issues  accompanied by his self:    HPI     Patient presents with:  Fall: yesterday. injured left rib- having alot of pain in rib      Patient fell forward after tripping.  Landed on an outstretched R hand and rolled to L chest landing on his L arm.  Has noticed pain in the area of L lateral chest wall since this time.  No hemoptysis or SOB per Patient report.  Patient needs r/f of spironolactone and is amenable to labs today.     Review of Systems   Constitutional, HEENT, cardiovascular, pulmonary, gi and gu systems are negative, except as otherwise noted.      Objective    BP (!) 158/71   Pulse 62   Temp 97.9  F (36.6  C) (Oral)   Ht 1.803 m (5' 11\")   Wt 116.6 kg (257 lb)   SpO2 95%   BMI 35.84 kg/m    Body mass index is 35.84 kg/m .  Physical Exam   GENERAL: healthy, alert and no distress  RESP: lungs clear to auscultation - no rales, rhonchi or wheezes  CV: RRR, S1S2 WNL with no murmur.   MS: UE normal muscle tone, normal range of motion and no edema. Chest wall tenderness to palpation of lower mid axillary line L.  SKIN: Healing abrasion of R palm with no signs of infection.                 "

## 2021-08-26 NOTE — TELEPHONE ENCOUNTER
Pt presented to the clinic for appt with FP. Took transfer call. Pt was upset that he did not receive writers email with his massage therapy order. Documented under orders that this was emailed to him as he requested. Since he was in clinic writer printed order and gave to pt directly after verifying pt info    Gala DANGELO RN Specialty Triage 8/26/2021 10:45 AM

## 2021-08-27 LAB
CREAT UR-MCNC: 78 MG/DL
MICROALBUMIN UR-MCNC: 22 MG/L
MICROALBUMIN/CREAT UR: 28.21 MG/G CR (ref 0–17)

## 2021-08-30 NOTE — PROGRESS NOTES
Chief Complaint - hoarseness recheck, nasal papilloma    History of Present Illness - Gucci Logan is a 71 year old male who returns. I've seen him for a history of hoarseness since a few years. He gets some sensation in left throat. I saw him for this 3/2020. He feels something collects in the left throat. It gets raw. He feels he wants to reach down in scratch it. Former smoker. The patient notes a h/o reflux symptoms, but takes prilosec and that helps. CT chest showed no lung disease. Has a cirrhotic liver with portal hypertension. H/o sinus surgery. Laryngoscopy showed lots of dryness, but no lesions.     He also had a left nasal septum papilloma. I removed it last year.     He returns and notes voice in morning is raspy. Still has a sensation in left throat. Overall he is still better. Notes some tenderness left level 2 neck. Nose is much better. He can breath better. No concerns for lesion regrowth.     Past Medical History -   Patient Active Problem List   Diagnosis     Advanced directives, counseling/discussion     CARDIOVASCULAR SCREENING; LDL GOAL LESS THAN 130     Hypertension goal BP (blood pressure) < 140/90     Obesity, unspecified obesity severity, unspecified obesity type     Gastroesophageal reflux disease, esophagitis presence not specified     Hyperlipidemia LDL goal <100     Impaired fasting glucose     Obesity (BMI 35.0-39.9) with comorbidity (H)     Diabetes mellitus, type 2 (H)     Type 2 diabetes mellitus with diabetic autonomic neuropathy, without long-term current use of insulin (H)     Other cirrhosis of liver (H)     Abdominal pain     Portal vein thrombosis     Vitamin D deficiency     Thrombocytopenia (H)     Splenomegaly     Osteoarthrosis     SHONDA (obstructive sleep apnea)     Lymphadenopathy     Left ventricular hypertrophy     Jaundice     Chronic hepatitis C virus infection (H)       Current Medications -   Current Outpatient Medications:      aspirin (ASA) 81 MG chewable  tablet, Take 81 mg by mouth daily, Disp: , Rfl:      atorvastatin (LIPITOR) 10 MG tablet, Take 1 tablet by mouth once daily, Disp: 90 tablet, Rfl: 0     cholecalciferol 25 MCG (1000 UT) TABS, Take 1,000 Units by mouth daily 2 tabs daily, Disp: , Rfl:      hydrochlorothiazide (HYDRODIURIL) 50 MG tablet, Take 1 tablet by mouth once daily, Disp: 90 tablet, Rfl: 0     levothyroxine (SYNTHROID/LEVOTHROID) 25 MCG tablet, TAKE 1 TABLET (25 MCG) BY MOUTH ONCE DAILY, Disp: 90 tablet, Rfl: 1     losartan (COZAAR) 100 MG tablet, Take 1 tablet by mouth once daily, Disp: 90 tablet, Rfl: 1     metFORMIN (GLUCOPHAGE) 1000 MG tablet, Take 1 tablet (1,000 mg) by mouth 2 times daily (with meals) (Patient taking differently: Take 1,000 mg by mouth three times a week ), Disp: 180 tablet, Rfl: 1     metoprolol succinate ER (TOPROL-XL) 25 MG 24 hr tablet, 75 mg ( 3 tabs ) po daily, Disp: 270 tablet, Rfl: 1     multivitamin w/minerals (MULTI-VITAMIN) tablet, Take 1 tablet by mouth daily, Disp: , Rfl:      omeprazole (PRILOSEC) 20 MG DR capsule, TAKE 1 CAPSULE BY MOUTH ONCE DAILY 30 TO 60 MINUTES BEFORE A MEAL, Disp: 90 capsule, Rfl: 0     spironolactone (ALDACTONE) 25 MG tablet, Take 1 tablet (25 mg) by mouth daily, Disp: 90 tablet, Rfl: 1    Allergies -   Allergies   Allergen Reactions     Influenza Vaccines Other (See Comments)     delirium  fever         Social History -   Social History     Socioeconomic History     Marital status: Single     Spouse name: Not on file     Number of children: 0     Years of education: 18     Highest education level: Not on file   Occupational History     Occupation: Contractor     Employer: SELF     Comment: Not working now   Social Needs     Financial resource strain: Not on file     Food insecurity     Worry: Not on file     Inability: Not on file     Transportation needs     Medical: Not on file     Non-medical: Not on file   Tobacco Use     Smoking status: Former Smoker     Packs/day: 0.00      Years: 5.00     Pack years: 0.00     Types: Cigarettes     Start date: 1990     Last attempt to quit: 1999     Years since quittin.2     Smokeless tobacco: Never Used   Substance and Sexual Activity     Alcohol use: No     Comment: quit in      Drug use: No     Sexual activity: Not Currently     Partners: Female   Lifestyle     Physical activity     Days per week: Not on file     Minutes per session: Not on file     Stress: Not on file   Relationships     Social connections     Talks on phone: Not on file     Gets together: Not on file     Attends Mor[employer] service: Not on file     Active member of club or organization: Not on file     Attends meetings of clubs or organizations: Not on file     Relationship status: Not on file     Intimate partner violence     Fear of current or ex partner: Not on file     Emotionally abused: Not on file     Physically abused: Not on file     Forced sexual activity: Not on file   Other Topics Concern     Parent/sibling w/ CABG, MI or angioplasty before 65F 55M? No   Social History Narrative     Not on file       Family History -   Family History   Problem Relation Age of Onset     Alzheimer Disease Mother 85     Dementia Mother      Cerebrovascular Disease Father 50     Cancer Father      Hypertension Father      Neurologic Disorder No family hx of      Diabetes No family hx of        Review of Systems - As per HPI and PMHx, otherwise 7 system review of the head and neck is negative.      Physical Exam  BP (!) 149/73   Pulse 64   Resp 16   SpO2 98%   General - The patient is nontoxic.  Alert and oriented to person and place, answers questions and cooperates with examination appropriately.   Voice and Breathing - The patient was breathing comfortably without the use of accessory muscles. There was no wheezing, stridor, or stertor.  The patients voice was mildly hoarse.   Eyes - Extraocular movements intact.  Sclera were not icteric or injected, conjunctiva were  pink and moist.  Nose -anterior septum on the left is well-healed.  There is little scarring but no evidence of papilloma.  Septum is mostly straight with some deviation to the right.  No polyps or pus.   Neck -  Soft, non-tender. I do feel about a 1.5-2 cm left level 2 mass, likely lymph node, possibly parotid mass in the tail. Palpation of the occipital, submental, submandibular, internal jugular chain, and supraclavicular nodes did not demonstrate any abnormal lymph nodes or masses. Parotids without masses.   Neurologic - CN II-XII are grossly intact. No focal neurologic deficits.     Laryngoscopy -I sprayed the left nasal cavity with phenylephrine lidocaine spray.  I slid the scope along the floor the left nasal cavity.  There is no lesions, masses.  There is no polyps or pus.  Airway is open.  The nasopharynx was normal.  Advance the scope and turned the corner and looked at the oropharynx.  He has normal lingual tonsil tissue.  I see no evidence of masses or ulceration.  The supraglottic structures were normal.  Both vocal cords were mobile.  There were white and no lesions were seen.  The hypopharynx appeared normal without ulceration.    A/P - Gucci Logan is a 71 year old male with hoarseness and left level 2 lymphadenopathy.  Nasopharyngoscopy was normal.  He has had a longstanding history of globus sensation in the left throat.  Again I see nothing today.  However on physical exam palpation revealed a possible enlarged lymph node or mass in the left level 2.  A tail of parotid mass is also possible.  I will get a CT neck to examine this area.    Dr. Dudley Adams  Otolaryngology  [facility]

## 2021-08-31 NOTE — TELEPHONE ENCOUNTER
Patient needs Rx for diabetic shoes and  inserts with heel lift     New order for O&P placed    patient informed  Tiffanie Bobby CMA

## 2021-09-01 ENCOUNTER — OFFICE VISIT (OUTPATIENT)
Dept: OTOLARYNGOLOGY | Facility: CLINIC | Age: 71
End: 2021-09-01
Payer: COMMERCIAL

## 2021-09-01 VITALS
OXYGEN SATURATION: 98 % | HEART RATE: 64 BPM | SYSTOLIC BLOOD PRESSURE: 149 MMHG | RESPIRATION RATE: 16 BRPM | DIASTOLIC BLOOD PRESSURE: 73 MMHG

## 2021-09-01 DIAGNOSIS — R59.1 LYMPHADENOPATHY: ICD-10-CM

## 2021-09-01 DIAGNOSIS — R49.0 HOARSENESS: Primary | ICD-10-CM

## 2021-09-01 PROCEDURE — 99214 OFFICE O/P EST MOD 30 MIN: CPT | Mod: 25 | Performed by: OTOLARYNGOLOGY

## 2021-09-01 PROCEDURE — 31575 DIAGNOSTIC LARYNGOSCOPY: CPT | Performed by: OTOLARYNGOLOGY

## 2021-09-01 NOTE — LETTER
9/1/2021         RE: Gucci Logan  [address] [facility] [address]        Dear Colleague,    Thank you for referring your patient, Gucci Logan, to the [facility]. Please see a copy of my visit note below.    Chief Complaint - hoarseness recheck, nasal papilloma    History of Present Illness - Gucci Logan is a 71 year old male who returns. I've seen him for a history of hoarseness since a few years. He gets some sensation in left throat. I saw him for this 3/2020. He feels something collects in the left throat. It gets raw. He feels he wants to reach down in scratch it. Former smoker. The patient notes a h/o reflux symptoms, but takes prilosec and that helps. CT chest showed no lung disease. Has a cirrhotic liver with portal hypertension. H/o sinus surgery. Laryngoscopy showed lots of dryness, but no lesions.     He also had a left nasal septum papilloma. I removed it last year.     He returns and notes voice in morning is raspy. Still has a sensation in left throat. Overall he is still better. Notes some tenderness left level 2 neck. Nose is much better. He can breath better. No concerns for lesion regrowth.     Past Medical History -   Patient Active Problem List   Diagnosis     Advanced directives, counseling/discussion     CARDIOVASCULAR SCREENING; LDL GOAL LESS THAN 130     Hypertension goal BP (blood pressure) < 140/90     Obesity, unspecified obesity severity, unspecified obesity type     Gastroesophageal reflux disease, esophagitis presence not specified     Hyperlipidemia LDL goal <100     Impaired fasting glucose     Obesity (BMI 35.0-39.9) with comorbidity (H)     Diabetes mellitus, type 2 (H)     Type 2 diabetes mellitus with diabetic autonomic neuropathy, without long-term current use of insulin (H)     Other cirrhosis of liver (H)     Abdominal pain     Portal vein thrombosis     Vitamin D deficiency     Thrombocytopenia (H)     Splenomegaly      Osteoarthrosis     SHONDA (obstructive sleep apnea)     Lymphadenopathy     Left ventricular hypertrophy     Jaundice     Chronic hepatitis C virus infection (H)       Current Medications -   Current Outpatient Medications:      aspirin (ASA) 81 MG chewable tablet, Take 81 mg by mouth daily, Disp: , Rfl:      atorvastatin (LIPITOR) 10 MG tablet, Take 1 tablet by mouth once daily, Disp: 90 tablet, Rfl: 0     cholecalciferol 25 MCG (1000 UT) TABS, Take 1,000 Units by mouth daily 2 tabs daily, Disp: , Rfl:      hydrochlorothiazide (HYDRODIURIL) 50 MG tablet, Take 1 tablet by mouth once daily, Disp: 90 tablet, Rfl: 0     levothyroxine (SYNTHROID/LEVOTHROID) 25 MCG tablet, TAKE 1 TABLET (25 MCG) BY MOUTH ONCE DAILY, Disp: 90 tablet, Rfl: 1     losartan (COZAAR) 100 MG tablet, Take 1 tablet by mouth once daily, Disp: 90 tablet, Rfl: 1     metFORMIN (GLUCOPHAGE) 1000 MG tablet, Take 1 tablet (1,000 mg) by mouth 2 times daily (with meals) (Patient taking differently: Take 1,000 mg by mouth three times a week ), Disp: 180 tablet, Rfl: 1     metoprolol succinate ER (TOPROL-XL) 25 MG 24 hr tablet, 75 mg ( 3 tabs ) po daily, Disp: 270 tablet, Rfl: 1     multivitamin w/minerals (MULTI-VITAMIN) tablet, Take 1 tablet by mouth daily, Disp: , Rfl:      omeprazole (PRILOSEC) 20 MG DR capsule, TAKE 1 CAPSULE BY MOUTH ONCE DAILY 30 TO 60 MINUTES BEFORE A MEAL, Disp: 90 capsule, Rfl: 0     spironolactone (ALDACTONE) 25 MG tablet, Take 1 tablet (25 mg) by mouth daily, Disp: 90 tablet, Rfl: 1    Allergies -   Allergies   Allergen Reactions     Influenza Vaccines Other (See Comments)     delirium  fever         Social History -   Social History     Socioeconomic History     Marital status: Single     Spouse name: Not on file     Number of children: 0     Years of education: 18     Highest education level: Not on file   Occupational History     Occupation: Contractor     Employer: SELF     Comment: Not working now   Social Needs     Financial  resource strain: Not on file     Food insecurity     Worry: Not on file     Inability: Not on file     Transportation needs     Medical: Not on file     Non-medical: Not on file   Tobacco Use     Smoking status: Former Smoker     Packs/day: 0.00     Years: 5.00     Pack years: 0.00     Types: Cigarettes     Start date: 1990     Last attempt to quit: 1999     Years since quittin.2     Smokeless tobacco: Never Used   Substance and Sexual Activity     Alcohol use: No     Comment: quit in      Drug use: No     Sexual activity: Not Currently     Partners: Female   Lifestyle     Physical activity     Days per week: Not on file     Minutes per session: Not on file     Stress: Not on file   Relationships     Social connections     Talks on phone: Not on file     Gets together: Not on file     Attends Rastafarian service: Not on file     Active member of club or organization: Not on file     Attends meetings of clubs or organizations: Not on file     Relationship status: Not on file     Intimate partner violence     Fear of current or ex partner: Not on file     Emotionally abused: Not on file     Physically abused: Not on file     Forced sexual activity: Not on file   Other Topics Concern     Parent/sibling w/ CABG, MI or angioplasty before 65F 55M? No   Social History Narrative     Not on file       Family History -   Family History   Problem Relation Age of Onset     Alzheimer Disease Mother 85     Dementia Mother      Cerebrovascular Disease Father 50     Cancer Father      Hypertension Father      Neurologic Disorder No family hx of      Diabetes No family hx of        Review of Systems - As per HPI and PMHx, otherwise 7 system review of the head and neck is negative.      Physical Exam  BP (!) 149/73   Pulse 64   Resp 16   SpO2 98%   General - The patient is nontoxic.  Alert and oriented to person and place, answers questions and cooperates with examination appropriately.   Voice and Breathing - The  patient was breathing comfortably without the use of accessory muscles. There was no wheezing, stridor, or stertor.  The patients voice was mildly hoarse.   Eyes - Extraocular movements intact.  Sclera were not icteric or injected, conjunctiva were pink and moist.  Nose -anterior septum on the left is well-healed.  There is little scarring but no evidence of papilloma.  Septum is mostly straight with some deviation to the right.  No polyps or pus.   Neck -  Soft, non-tender. I do feel about a 1.5-2 cm left level 2 mass, likely lymph node, possibly parotid mass in the tail. Palpation of the occipital, submental, submandibular, internal jugular chain, and supraclavicular nodes did not demonstrate any abnormal lymph nodes or masses. Parotids without masses.   Neurologic - CN II-XII are grossly intact. No focal neurologic deficits.     Laryngoscopy -I sprayed the left nasal cavity with phenylephrine lidocaine spray.  I slid the scope along the floor the left nasal cavity.  There is no lesions, masses.  There is no polyps or pus.  Airway is open.  The nasopharynx was normal.  Advance the scope and turned the corner and looked at the oropharynx.  He has normal lingual tonsil tissue.  I see no evidence of masses or ulceration.  The supraglottic structures were normal.  Both vocal cords were mobile.  There were white and no lesions were seen.  The hypopharynx appeared normal without ulceration.    A/P - Gucci Logan is a 71 year old male with hoarseness and left level 2 lymphadenopathy.  Nasopharyngoscopy was normal.  He has had a longstanding history of globus sensation in the left throat.  Again I see nothing today.  However on physical exam palpation revealed a possible enlarged lymph node or mass in the left level 2.  A tail of parotid mass is also possible.  I will get a CT neck to examine this area.    Dr. Dudley Adams  Otolaryngology  [facility]        Again, thank you for allowing me to participate in  the care of your patient.        Sincerely,        Dudley Adams MD

## 2021-09-03 ENCOUNTER — TELEPHONE (OUTPATIENT)
Dept: FAMILY MEDICINE | Facility: CLINIC | Age: 71
End: 2021-09-03

## 2021-09-03 DIAGNOSIS — J06.9 VIRAL UPPER RESPIRATORY TRACT INFECTION WITH COUGH: Primary | ICD-10-CM

## 2021-09-03 DIAGNOSIS — E11.43 TYPE 2 DIABETES MELLITUS WITH DIABETIC AUTONOMIC NEUROPATHY, WITHOUT LONG-TERM CURRENT USE OF INSULIN (H): ICD-10-CM

## 2021-09-03 NOTE — TELEPHONE ENCOUNTER
Attempted to call pt at [phone]. This was the first attempt at calling and unable to leave a voice message (mailbox not set up yet).    Please try to call pt again with message from Jada.    Nerissa AVALOS RN, BSN  [facility]

## 2021-09-03 NOTE — TELEPHONE ENCOUNTER
"Patient calling, points out he was seen by Jada Miranda 8/26/21 for rib pain.   She provided him with medication with codeine to prevent cough due to rib pain.      Says he is still having the same rib pain.  Says he has now developed cold symptoms over the last few days, hoarse voice, fatigue and headache, mild runny nose, clear nasal drainage.    Denies fever or cough but worries that he might develop a pneumonia secondary to the rib pain and not breathing deeply.       I see he was seen for hoarseness 2 days ago by ENT.   Has CT scheduled to check enlarged lymph node next Weds, 9/8.    I advised virtual visit to discuss, possibly covid testing indicated?   He declines, says he is sure it is not covid but if it gets worse he will get tested at [facility].  Reports he does not go anywhere.  I advised CT would need to be postponed if he has symptoms so advised he get tested Monday if not improving.    He would like to see if Jada Miranda wants to put him on an antibiotic to prevent a respiratory infection due to him not breathing deeply.   I advised he work on breathing deep and coughing by using a pillow to splint painful rib area.     He says he does not think he needs more pain med.    Pharmacy selected, I expressed my doubts that antibiotic will be indicated but he wants to update Jada Miranda anyhow.   Says he will just \"tough it out\" if no Rx sent.    Also wants to say \"hi\" and \"have a good weekend\" to Jada Miranda.      Routed to provider Jada Miranda, appears is in clinic at  today, to address.    Connie Garza RN  [facility]          "

## 2021-09-03 NOTE — TELEPHONE ENCOUNTER
Let Patient know we don't assume anything in medicine and that COVID testing is warranted since he went to the  for a work up.  They will contact him shortly for testing.  He only needs to be seen in clinic for follow up if sx persist > 14 days and his COVID testing is negative.  Follow up  right away with any fever or if symptoms continue to worsen.   Elian MOSS

## 2021-09-08 ENCOUNTER — ANCILLARY PROCEDURE (OUTPATIENT)
Dept: CT IMAGING | Facility: CLINIC | Age: 71
End: 2021-09-08
Attending: OTOLARYNGOLOGY
Payer: COMMERCIAL

## 2021-09-08 DIAGNOSIS — R59.1 LYMPHADENOPATHY: ICD-10-CM

## 2021-09-08 DIAGNOSIS — R49.0 HOARSENESS: ICD-10-CM

## 2021-09-08 PROCEDURE — 70491 CT SOFT TISSUE NECK W/DYE: CPT | Mod: TC | Performed by: RADIOLOGY

## 2021-09-08 PROCEDURE — 82565 ASSAY OF CREATININE: CPT

## 2021-09-08 RX ORDER — IOPAMIDOL 755 MG/ML
80 INJECTION, SOLUTION INTRAVASCULAR ONCE
Status: COMPLETED | OUTPATIENT
Start: 2021-09-08 | End: 2021-09-08

## 2021-09-08 RX ADMIN — IOPAMIDOL 80 ML: 755 INJECTION, SOLUTION INTRAVASCULAR at 10:24

## 2021-09-08 NOTE — TELEPHONE ENCOUNTER
Spoke with pt. Read him the first message of Jada's message and pt states he doesn't agree with this. Mentioned that he was prescribed a new medication and didn't get a message from our office until after the holiday weekend so had to spend the entire weekend confused about his medication. When writer tried to clarify which medication pt states he has a meeting to go to and didn't have time to discuss. States he has been scammed over the phone and does not want to have phone conversations because of this.    States when he had the conversation with the previous nurse he felt insulted. Pt states we are more concerned about getting a questionnaire from a visit completed than we are with getting a message to pt from provider. Mentioned we are late and no good, and he is going to take care in another manner. Priorities are way off. Pt thanked writer for the call then hung up.    Katie Nair RN  [facility]

## 2021-09-09 ENCOUNTER — OFFICE VISIT (OUTPATIENT)
Dept: PODIATRY | Facility: CLINIC | Age: 71
End: 2021-09-09
Payer: COMMERCIAL

## 2021-09-09 VITALS — SYSTOLIC BLOOD PRESSURE: 156 MMHG | DIASTOLIC BLOOD PRESSURE: 68 MMHG | HEART RATE: 65 BPM

## 2021-09-09 DIAGNOSIS — M21.70 UNEQUAL LEG LENGTH (ACQUIRED): ICD-10-CM

## 2021-09-09 DIAGNOSIS — M24.571 EQUINUS CONTRACTURE OF RIGHT ANKLE: Primary | ICD-10-CM

## 2021-09-09 LAB
CREAT BLD-MCNC: 1.1 MG/DL (ref 0.5–1.2)
GFR SERPL CREATININE-BSD FRML MDRD: 68 ML/MIN/{1.73_M2}

## 2021-09-09 PROCEDURE — 99213 OFFICE O/P EST LOW 20 MIN: CPT | Performed by: PODIATRIST

## 2021-09-09 NOTE — PROGRESS NOTES
Subjective:    Patient seen today for short right leg.  Has a history of ankle fracture on this side.  States he cannot lift his foot back as much.  Long time ago he had AFO states this is helpful.  He is wondering if some left on the outside of his shoe would be helpful.  Has had heel lift inside his shoe and did not work well.  Has history of knee surgery and nerve was injured and he has weakness on this leg.  Patient is retired.  History of alcoholism and cirrhosis of the liver.  Patient has diabetes.      ROS:  See above       Allergies   Allergen Reactions     Influenza Vaccines Other (See Comments)     delirium  fever         Current Outpatient Medications   Medication Sig Dispense Refill     aspirin (ASA) 81 MG chewable tablet Take 81 mg by mouth daily       atorvastatin (LIPITOR) 10 MG tablet Take 1 tablet by mouth once daily 90 tablet 0     cholecalciferol 25 MCG (1000 UT) TABS Take 1,000 Units by mouth daily 2 tabs daily (Patient not taking: Reported on 9/1/2021)       hydrochlorothiazide (HYDRODIURIL) 50 MG tablet Take 1 tablet by mouth once daily 90 tablet 0     levothyroxine (SYNTHROID/LEVOTHROID) 25 MCG tablet TAKE 1 TABLET (25 MCG) BY MOUTH ONCE DAILY 90 tablet 1     losartan (COZAAR) 100 MG tablet Take 1 tablet by mouth once daily 90 tablet 1     metFORMIN (GLUCOPHAGE) 1000 MG tablet Take one tablet three times weekly. 63 tablet 3     metoprolol succinate ER (TOPROL-XL) 25 MG 24 hr tablet 75 mg ( 3 tabs ) po daily 270 tablet 1     multivitamin w/minerals (MULTI-VITAMIN) tablet Take 1 tablet by mouth daily       omeprazole (PRILOSEC) 20 MG DR capsule TAKE 1 CAPSULE BY MOUTH ONCE DAILY 30 TO 60 MINUTES BEFORE A MEAL 90 capsule 0     spironolactone (ALDACTONE) 25 MG tablet Take 1 tablet (25 mg) by mouth daily 90 tablet 1       Patient Active Problem List   Diagnosis     Advanced directives, counseling/discussion     CARDIOVASCULAR SCREENING; LDL GOAL LESS THAN 130     Hypertension goal BP (blood  pressure) < 140/90     Obesity, unspecified obesity severity, unspecified obesity type     Gastroesophageal reflux disease, esophagitis presence not specified     Hyperlipidemia LDL goal <100     Impaired fasting glucose     Obesity (BMI 35.0-39.9) with comorbidity (H)     Diabetes mellitus, type 2 (H)     Type 2 diabetes mellitus with diabetic autonomic neuropathy, without long-term current use of insulin (H)     Other cirrhosis of liver (H)     Abdominal pain     Portal vein thrombosis     Vitamin D deficiency     Thrombocytopenia (H)     Splenomegaly     Osteoarthrosis     SHONDA (obstructive sleep apnea)     Lymphadenopathy     Left ventricular hypertrophy     Jaundice     Chronic hepatitis C virus infection (H)       Past Medical History:   Diagnosis Date     Arthritis      Esophageal reflux 3/1/2014     Hearing problem      Hiatal hernia      Hypertension      Jaundice 5/31/2008     Liver disease      Obesity 1/24/2013     Obstructive sleep apnea      Sleep apnea     Advised CPAP machine. Not keen to use it.        Past Surgical History:   Procedure Laterality Date     ARTHROSCOPY KNEE RT/LT      (L) with partial medial meniscectomy     C OPEN RX ANKLE DISLOCATN+FIXATN      (R)     C SPINAL FUSION,ANT,EA ADNL LEVEL      T12 - L1     CATARACT IOL, RT/LT  Nov and Dec 2017     COLONOSCOPY N/A 4/24/2019    Procedure: COLONOSCOPY, WITH POLYPECTOMY AND BIOPSY;  Surgeon: Leventhal, Thomas Michael, MD;  Location:  OR     DACRYOCYSTORHINOSTOMY Left 10/16/2018    Procedure: DACRYOCYSTORHINOSTOMY;  Surgeon: Madhu Krause MD;  Location: [facility]     ENDOSCOPIC ENDONASAL SURGERY  1994     ENDOSCOPY  2-19-15     ESOPHAGOSCOPY, GASTROSCOPY, DUODENOSCOPY (EGD), COMBINED N/A 4/24/2019    Procedure: COMBINED ESOPHAGOSCOPY, GASTROSCOPY, DUODENOSCOPY (EGD) - hold aspirin ibuprofen or naproxen for one week prior (per physician order);  Surgeon: Leventhal, Thomas Michael, MD;  Location:  OR     EYE SURGERY       Hernia  surgery Left      NASAL/SINUS POLYPECTOMY       REPAIR PTOSIS Left 10/16/2018    Procedure: LEFT UPPER LID PTOSIS AND BILATERAL  BROW PTOSIS REPAIR WITH LEFT DACRYOCYSTORHINOSTOMY ;  Surgeon: Madhu Krause MD;  Location: [facility]     REPAIR PTOSIS BROW Bilateral 10/16/2018    Procedure: REPAIR PTOSIS BROW;  Surgeon: Madhu Krause MD;  Location: [facility]     ROTATOR CUFF REPAIR RT/LT Right        Family History   Problem Relation Age of Onset     Alzheimer Disease Mother 85     Dementia Mother      Cerebrovascular Disease Father 50     Cancer Father      Hypertension Father      Neurologic Disorder No family hx of      Diabetes No family hx of        Social History     Tobacco Use     Smoking status: Former Smoker     Packs/day: 0.00     Years: 5.00     Pack years: 0.00     Types: Cigarettes     Start date: 1990     Quit date: 1999     Years since quittin.7     Smokeless tobacco: Never Used   Substance Use Topics     Alcohol use: Yes     Comment: rare       Objective:    O:  There were no vitals taken for this visit..      Constitutional/ general:  Pt is in no apparent distress, appears well-nourished.  Cooperative with history and physical exam.     Psych:  The patient answered questions appropriately.  Normal affect.  Seems to have reasonable expectations, in terms of treatment.     Eyes:  Visual scanning/ tracking without deficit.     Lungs:  Non labored breathing, non labored speech. No cough.  No audible wheezing. Even, quiet breathing.       Vascular:  positive pedal pulses bilaterally for both the DP and PT arteries.  CFT < 3 sec.  positive ankle edema.  positive pedal hair growth.    Neuro:  Alert and oriented x 3. Antalgic gait.      Derm: Normal texture and turgor.  No erythema, ecchymosis, or cyanosis.      Musculoskeletal:    Lower extremity muscle strength is normal on left, weaker on right.  He has an antalgic gait.  Right ankle has decreased range of motion and can barely get  to 90 degrees.  Right lower extremity appears to be approximately three quarters of an inch shorter    Assessment: Right ankle equinus                         Unequal leg length    Plan: Personally reviewed past x-rays.  Discussed treatment options for unequal leg length.  He would like to start with a lift on the outside of his right shoe since he did not like heel lift.  Wrote an order for this.  Discussed AFO could be helpful in keeping his foot dorsiflexed while walking.  He would like to try the heel lift first.  He will call if he would like an AFO.  RTC as needed    Scooter Richards DPM, FACFAS

## 2021-09-09 NOTE — LETTER
9/9/2021         RE: Gucci Logan  [address] [facility] [address]        Dear Colleague,    Thank you for referring your patient, Gucci Logan, to the [facility]. Please see a copy of my visit note below.     Subjective:    Patient seen today for short right leg.  Has a history of ankle fracture on this side.  States he cannot lift his foot back as much.  Long time ago he had AFO states this is helpful.  He is wondering if some left on the outside of his shoe would be helpful.  Has had heel lift inside his shoe and did not work well.  Has history of knee surgery and nerve was injured and he has weakness on this leg.  Patient is retired.  History of alcoholism and cirrhosis of the liver.  Patient has diabetes.      ROS:  See above       Allergies   Allergen Reactions     Influenza Vaccines Other (See Comments)     delirium  fever         Current Outpatient Medications   Medication Sig Dispense Refill     aspirin (ASA) 81 MG chewable tablet Take 81 mg by mouth daily       atorvastatin (LIPITOR) 10 MG tablet Take 1 tablet by mouth once daily 90 tablet 0     cholecalciferol 25 MCG (1000 UT) TABS Take 1,000 Units by mouth daily 2 tabs daily (Patient not taking: Reported on 9/1/2021)       hydrochlorothiazide (HYDRODIURIL) 50 MG tablet Take 1 tablet by mouth once daily 90 tablet 0     levothyroxine (SYNTHROID/LEVOTHROID) 25 MCG tablet TAKE 1 TABLET (25 MCG) BY MOUTH ONCE DAILY 90 tablet 1     losartan (COZAAR) 100 MG tablet Take 1 tablet by mouth once daily 90 tablet 1     metFORMIN (GLUCOPHAGE) 1000 MG tablet Take one tablet three times weekly. 63 tablet 3     metoprolol succinate ER (TOPROL-XL) 25 MG 24 hr tablet 75 mg ( 3 tabs ) po daily 270 tablet 1     multivitamin w/minerals (MULTI-VITAMIN) tablet Take 1 tablet by mouth daily       omeprazole (PRILOSEC) 20 MG DR capsule TAKE 1 CAPSULE BY MOUTH ONCE DAILY 30 TO 60 MINUTES BEFORE A MEAL 90 capsule 0     spironolactone  (ALDACTONE) 25 MG tablet Take 1 tablet (25 mg) by mouth daily 90 tablet 1       Patient Active Problem List   Diagnosis     Advanced directives, counseling/discussion     CARDIOVASCULAR SCREENING; LDL GOAL LESS THAN 130     Hypertension goal BP (blood pressure) < 140/90     Obesity, unspecified obesity severity, unspecified obesity type     Gastroesophageal reflux disease, esophagitis presence not specified     Hyperlipidemia LDL goal <100     Impaired fasting glucose     Obesity (BMI 35.0-39.9) with comorbidity (H)     Diabetes mellitus, type 2 (H)     Type 2 diabetes mellitus with diabetic autonomic neuropathy, without long-term current use of insulin (H)     Other cirrhosis of liver (H)     Abdominal pain     Portal vein thrombosis     Vitamin D deficiency     Thrombocytopenia (H)     Splenomegaly     Osteoarthrosis     SHONDA (obstructive sleep apnea)     Lymphadenopathy     Left ventricular hypertrophy     Jaundice     Chronic hepatitis C virus infection (H)       Past Medical History:   Diagnosis Date     Arthritis      Esophageal reflux 3/1/2014     Hearing problem      Hiatal hernia      Hypertension      Jaundice 5/31/2008     Liver disease      Obesity 1/24/2013     Obstructive sleep apnea      Sleep apnea     Advised CPAP machine. Not keen to use it.        Past Surgical History:   Procedure Laterality Date     ARTHROSCOPY KNEE RT/LT      (L) with partial medial meniscectomy     C OPEN RX ANKLE DISLOCATN+FIXATN      (R)     C SPINAL FUSION,ANT,EA ADNL LEVEL      T12 - L1     CATARACT IOL, RT/LT  Nov and Dec 2017     COLONOSCOPY N/A 4/24/2019    Procedure: COLONOSCOPY, WITH POLYPECTOMY AND BIOPSY;  Surgeon: Leventhal, Thomas Michael, MD;  Location: [facility] OR     DACRYOCYSTORHINOSTOMY Left 10/16/2018    Procedure: DACRYOCYSTORHINOSTOMY;  Surgeon: Madhu Krause MD;  [facility]: [facility]     ENDOSCOPIC ENDONASAL SURGERY  1994     ENDOSCOPY  2-19-15     ESOPHAGOSCOPY, GASTROSCOPY, DUODENOSCOPY (EGD), COMBINED N/A  2019    Procedure: COMBINED ESOPHAGOSCOPY, GASTROSCOPY, DUODENOSCOPY (EGD) - hold aspirin ibuprofen or naproxen for one week prior (per physician order);  Surgeon: Leventhal, Thomas Michael, MD;  Location: [facility] OR     EYE SURGERY       Hernia surgery Left      NASAL/SINUS POLYPECTOMY       REPAIR PTOSIS Left 10/16/2018    Procedure: LEFT UPPER LID PTOSIS AND BILATERAL  BROW PTOSIS REPAIR WITH LEFT DACRYOCYSTORHINOSTOMY ;  Surgeon: Madhu Krause MD;  Location: [facility]     REPAIR PTOSIS BROW Bilateral 10/16/2018    Procedure: REPAIR PTOSIS BROW;  Surgeon: Madhu Krause MD;  Location:  [facility]     ROTATOR CUFF REPAIR RT/LT Right        Family History   Problem Relation Age of Onset     Alzheimer Disease Mother 85     Dementia Mother      Cerebrovascular Disease Father 50     Cancer Father      Hypertension Father      Neurologic Disorder No family hx of      Diabetes No family hx of        Social History     Tobacco Use     Smoking status: Former Smoker     Packs/day: 0.00     Years: 5.00     Pack years: 0.00     Types: Cigarettes     Start date: 1990     Quit date: 1999     Years since quittin.7     Smokeless tobacco: Never Used   Substance Use Topics     Alcohol use: Yes     Comment: rare       Objective:    O:  There were no vitals taken for this visit..      Constitutional/ general:  Pt is in no apparent distress, appears well-nourished.  Cooperative with history and physical exam.     Psych:  The patient answered questions appropriately.  Normal affect.  Seems to have reasonable expectations, in terms of treatment.     Eyes:  Visual scanning/ tracking without deficit.     Lungs:  Non labored breathing, non labored speech. No cough.  No audible wheezing. Even, quiet breathing.       Vascular:  positive pedal pulses bilaterally for both the DP and PT arteries.  CFT < 3 sec.  positive ankle edema.  positive pedal hair growth.    Neuro:  Alert and oriented x 3. Antalgic gait.      Derm:  Normal texture and turgor.  No erythema, ecchymosis, or cyanosis.      Musculoskeletal:    Lower extremity muscle strength is normal on left, weaker on right.  He has an antalgic gait.  Right ankle has decreased range of motion and can barely get to 90 degrees.  Right lower extremity appears to be approximately three quarters of an inch shorter    Assessment: Right ankle equinus                         Unequal leg length    Plan: Personally reviewed past x-rays.  Discussed treatment options for unequal leg length.  He would like to start with a lift on the outside of his right shoe since he did not like heel lift.  Wrote an order for this.  Discussed AFO could be helpful in keeping his foot dorsiflexed while walking.  He would like to try the heel lift first.  He will call if he would like an AFO.  RTC as needed    Scooter Richards DPM, FACFAS            Again, thank you for allowing me to participate in the care of your patient.        Sincerely,        Scooter Richards DPM

## 2021-09-09 NOTE — PATIENT INSTRUCTIONS
We wish you continued good healing. If you have any questions or concerns, please do not hesitate to contact us at [phone]    [employer] (secure e-mail communication and access to your chart) to send a message or to make an appointment.    Please remember to call and schedule a follow up appointment if one was recommended at your earliest convenience.     +++OF MARCH 2020+++ LOCATION AND HOURS HAVE CHANGED    PLEASE CALL CLINICS TO VERIFY DAYS AND TIMES  PODIATRY CLINIC HOURS  TELEPHONE NUMBER    Dr. Scooter BERRIOSPSHA [facility]        Clinics:  [place]   Tuesday 1PM-6PM  [place]  Wednesday 745AM-330PM  [place]  Thursday/Friday 745AM-230PM  [place] APPOINTMENTS  [phone]    [place] APPOINTMENTS  [phone]          If you need a medication refill, please contact us you may need lab work and/or a follow up visit prior to your refill (i.e. Antifungal medications).    If MRI needed please call Imaging at [phone] or [phone]    HOW DO I GET MY KNEE SCOOTER? Knee scooters can be picked up at ANY Medical Supply stores with your knee scooter Prescription.  OR    Bring your signed prescription to an [facility] showroom.

## 2021-09-14 NOTE — PROGRESS NOTES
Chief Complaint - Hearing loss    History of Present Illness - Gucci Logan is a 71 year old male who presents to me today with hearing loss in both ears.  It has been present and noticeable for approximately years, but worsening. Audiogram from 2015 showed down-sloping high frequency sensorineural hearing loss. There is no history of recent head trauma, or chronic ear disease or ear surgery.  With regards to recreational, , and work-related noise exposure he has a lot. The patient denies otorrhea and otalgia.     CT neck for his globus and possible left neck mass was negative. He may have a small lipoma palpable in the left.     Past Medical History -   Patient Active Problem List   Diagnosis     Advanced directives, counseling/discussion     CARDIOVASCULAR SCREENING; LDL GOAL LESS THAN 130     Hypertension goal BP (blood pressure) < 140/90     Obesity, unspecified obesity severity, unspecified obesity type     Gastroesophageal reflux disease, esophagitis presence not specified     Hyperlipidemia LDL goal <100     Impaired fasting glucose     Obesity (BMI 35.0-39.9) with comorbidity (H)     Diabetes mellitus, type 2 (H)     Type 2 diabetes mellitus with diabetic autonomic neuropathy, without long-term current use of insulin (H)     Other cirrhosis of liver (H)     Abdominal pain     Portal vein thrombosis     Vitamin D deficiency     Thrombocytopenia (H)     Splenomegaly     Osteoarthrosis     SHONDA (obstructive sleep apnea)     Lymphadenopathy     Left ventricular hypertrophy     Jaundice     Chronic hepatitis C virus infection (H)       Current Medications -   Current Outpatient Medications:      aspirin (ASA) 81 MG chewable tablet, Take 81 mg by mouth daily, Disp: , Rfl:      atorvastatin (LIPITOR) 10 MG tablet, Take 1 tablet by mouth once daily, Disp: 90 tablet, Rfl: 0     cholecalciferol 25 MCG (1000 UT) TABS, Take 1,000 Units by mouth daily 2 tabs daily (Patient not taking: Reported on 9/1/2021),  Disp: , Rfl:      hydrochlorothiazide (HYDRODIURIL) 50 MG tablet, Take 1 tablet by mouth once daily, Disp: 90 tablet, Rfl: 0     levothyroxine (SYNTHROID/LEVOTHROID) 25 MCG tablet, TAKE 1 TABLET (25 MCG) BY MOUTH ONCE DAILY, Disp: 90 tablet, Rfl: 1     losartan (COZAAR) 100 MG tablet, Take 1 tablet by mouth once daily, Disp: 90 tablet, Rfl: 1     metFORMIN (GLUCOPHAGE) 1000 MG tablet, Take one tablet three times weekly., Disp: 63 tablet, Rfl: 3     metoprolol succinate ER (TOPROL-XL) 25 MG 24 hr tablet, 75 mg ( 3 tabs ) po daily, Disp: 270 tablet, Rfl: 1     multivitamin w/minerals (MULTI-VITAMIN) tablet, Take 1 tablet by mouth daily, Disp: , Rfl:      omeprazole (PRILOSEC) 20 MG DR capsule, TAKE 1 CAPSULE BY MOUTH ONCE DAILY 30 TO 60 MINUTES BEFORE A MEAL, Disp: 90 capsule, Rfl: 0     spironolactone (ALDACTONE) 25 MG tablet, Take 1 tablet (25 mg) by mouth daily, Disp: 90 tablet, Rfl: 1    Allergies -   Allergies   Allergen Reactions     Influenza Vaccines Other (See Comments)     delirium  fever         Social History -   Social History     Socioeconomic History     Marital status: Single     Spouse name: Not on file     Number of children: 0     Years of education: 18     Highest education level: Not on file   Occupational History     Occupation: Contractor     Employer: [employer]     Comment: Not working now   Tobacco Use     Smoking status: Former Smoker     Packs/day: 0.00     Years: 5.00     Pack years: 0.00     Types: Cigarettes     Start date: 1990     Quit date: 1999     Years since quittin.7     Smokeless tobacco: Never Used   Substance and Sexual Activity     Alcohol use: Yes     Comment: rare     Drug use: No     Sexual activity: Not Currently     Partners: Female   Other Topics Concern     Parent/sibling w/ CABG, MI or angioplasty before 65F 55M? No   Social History Narrative     Not on file     Social Determinants of Health     Financial Resource Strain:      Difficulty of Paying Living  Expenses:    Food Insecurity:      Worried About Running Out of Food in the Last Year:      Ran Out of Food in the Last Year:    Transportation Needs:      Lack of Transportation (Medical):      Lack of Transportation (Non-Medical):    Physical Activity:      Days of Exercise per Week:      Minutes of Exercise per Session:    Stress:      Feeling of Stress :    Social Connections:      Frequency of Communication with Friends and Family:      Frequency of Social Gatherings with Friends and Family:      Attends [employer] Services:      Active Member of Clubs or Organizations:      Attends Club or Organization Meetings:      Marital Status:    Intimate Partner Violence:      Fear of Current or Ex-Partner:      Emotionally Abused:      Physically Abused:      Sexually Abused:        Physical Exam  BP (!) 153/76   Pulse 62   Resp 16   SpO2 97%   General - The patient is in no distress.  Alert and oriented to person and place, answers questions and cooperates with examination appropriately.   Voice and Breathing - The patient was breathing comfortably without the use of accessory muscles. There was no wheezing, stridor, or stertor.  The patients voice was clear and strong.  Ears - The auricles are normal. The tympanic membranes are normal in appearance, bony landmarks are intact.  No retraction, perforation, or masses.  No fluid or purulence was seen in the external canal or the middle ear. No evidence of infection of the middle ear or external canal, cerumen was normal in appearance.  Eyes - Extraocular movements intact.  Sclera were not icteric or injected.  Neck - Soft, non-tender.  The left lateral neck deep to the SCM appears to have a 1.5 cm soft mobile mass.  Given the negative CT findings this is likely consistent with a lipoma.  I feel no other masses in the neck.  No lymphadenopathy.  Neurological - Cranial nerves 2 through 12 were grossly intact. House-Brackmann grade 1 out of 6 bilaterally.       Audiologic  Studies - An audiogram and tympanogram were performed today as part of the evaluation and personally reviewed. The tympanogram shows a normal Type A curve, with normal canal volume and middle ear pressure.  There is no sign of eustachian tube dysfunction or middle ear effusion.  The audiogram showed a significant down-sloping mid to high frequency sensorineural hearing loss.  There was no evidence of conductive hearing loss or significant asymmetry.  When compared to his audiogram from 2015 this has worsened in the highest tones.      Assessment and Plan - Gucci Logan is a 71 year old male who presents to me today with hearing loss.  This is most consistent with presbycusis and noise exposure. I can find no evidence of serious CNS disorders or other complicating factors that could be causing this.  We spent the remainder of today's visit on education. We discussed hearing protection in noisy environments.    The patient will follow up as necessary for worsening symptoms or changes in symptoms. I have also recommended yearly audiograms. I recommend a hearing aid consultation and hearing aids.    He has a likely left lateral neck lipoma, 1.5 cm in size, deep to the SCM.  I recommend observation. Return if this changes.    Dudley Adams MD  Otolaryngology  [facility]

## 2021-09-15 ENCOUNTER — OFFICE VISIT (OUTPATIENT)
Dept: OTOLARYNGOLOGY | Facility: CLINIC | Age: 71
End: 2021-09-15
Payer: COMMERCIAL

## 2021-09-15 ENCOUNTER — OFFICE VISIT (OUTPATIENT)
Dept: AUDIOLOGY | Facility: CLINIC | Age: 71
End: 2021-09-15
Payer: COMMERCIAL

## 2021-09-15 VITALS
RESPIRATION RATE: 16 BRPM | OXYGEN SATURATION: 97 % | HEART RATE: 62 BPM | DIASTOLIC BLOOD PRESSURE: 76 MMHG | SYSTOLIC BLOOD PRESSURE: 153 MMHG

## 2021-09-15 DIAGNOSIS — H90.3 SENSORINEURAL HEARING LOSS (SNHL) OF BOTH EARS: Primary | ICD-10-CM

## 2021-09-15 DIAGNOSIS — H90.3 SENSORINEURAL HEARING LOSS, BILATERAL: Primary | ICD-10-CM

## 2021-09-15 DIAGNOSIS — D17.0 LIPOMA OF NECK: ICD-10-CM

## 2021-09-15 PROCEDURE — 99213 OFFICE O/P EST LOW 20 MIN: CPT | Performed by: OTOLARYNGOLOGY

## 2021-09-15 PROCEDURE — 99207 PR NO CHARGE LOS: CPT | Performed by: AUDIOLOGIST

## 2021-09-15 NOTE — PROGRESS NOTES
AUDIOLOGY REPORT:    Patient was referred from ENT by Edin Adams MD for audiology evaluation.  Patient was last evaluated in this clinic on 3/25/15.  He reports occasional tinnitus, no otalgia, or ear pressure; feels his hearing may have declined.    Testing:    Otoscopy:   Otoscopic exam indicates ears are clear of cerumen bilaterally     Tympanograms:    RIGHT: normal eardrum mobility     LEFT:   normal eardrum mobility    Reflexes (reported by stimulus ear):  RIGHT: Ipsilateral is present at normal levels  RIGHT: Contralateral could not maintain seal  LEFT:   Ipsilateral could not maintain seal  LEFT:   Contralateral is present at normal levels    Thresholds:   Pure Tone Thresholds assessed using conventional audiometry with good reliability from 250-8000 Hz bilaterally using circumaural headphones      RIGHT:  normal  to mild from 250-2000 Hz, sloping to severe sensorineural hearing loss from 6921-9906 Hz    LEFT:    normal  to mild from 250-2000 Hz, sloping to severe sensorineural hearing loss from 2978-2322 Hz    Speech Reception Threshold:    RIGHT: 20 dB HL    LEFT:   20 dB HL  Speech Reception Thresholds are in good agreement with pure tone thresholds.    Word Recognition Score:     RIGHT: 100% at 60 dB HL using NU-6 recorded word list.    LEFT:   96% at 60 dB HL using NU-6 recorded word list.    Discussed results with the patient. Compared with 3/25/15, high frequency thresholds have declined slightly.  A hearing aid consultation is recommended.  I gave him the [employer] hearing aid informational packet.    Patient was returned to ENT for follow up.     Aureliano Garcia MA, CCC-A  Licensed Audiologist #[id number]  9/15/2021

## 2021-09-15 NOTE — LETTER
9/15/2021         RE: Gucci Logan  [address] [facility] [address]        Dear Colleague,    Thank you for referring your patient, Gucci Logan, to [facility]. Please see a copy of my visit note below.    Chief Complaint - Hearing loss    History of Present Illness - Gucci Logan is a 71 year old male who presents to me today with hearing loss in both ears.  It has been present and noticeable for approximately years, but worsening. Audiogram from 2015 showed down-sloping high frequency sensorineural hearing loss. There is no history of recent head trauma, or chronic ear disease or ear surgery.  With regards to recreational, , and work-related noise exposure he has a lot. The patient denies otorrhea and otalgia.     CT neck for his globus and possible left neck mass was negative. He may have a small lipoma palpable in the left.     Past Medical History -   Patient Active Problem List   Diagnosis     Advanced directives, counseling/discussion     CARDIOVASCULAR SCREENING; LDL GOAL LESS THAN 130     Hypertension goal BP (blood pressure) < 140/90     Obesity, unspecified obesity severity, unspecified obesity type     Gastroesophageal reflux disease, esophagitis presence not specified     Hyperlipidemia LDL goal <100     Impaired fasting glucose     Obesity (BMI 35.0-39.9) with comorbidity (H)     Diabetes mellitus, type 2 (H)     Type 2 diabetes mellitus with diabetic autonomic neuropathy, without long-term current use of insulin (H)     Other cirrhosis of liver (H)     Abdominal pain     Portal vein thrombosis     Vitamin D deficiency     Thrombocytopenia (H)     Splenomegaly     Osteoarthrosis     SHONDA (obstructive sleep apnea)     Lymphadenopathy     Left ventricular hypertrophy     Jaundice     Chronic hepatitis C virus infection (H)       Current Medications -   Current Outpatient Medications:      aspirin (ASA) 81 MG chewable tablet, Take 81 mg by  mouth daily, Disp: , Rfl:      atorvastatin (LIPITOR) 10 MG tablet, Take 1 tablet by mouth once daily, Disp: 90 tablet, Rfl: 0     cholecalciferol 25 MCG (1000 UT) TABS, Take 1,000 Units by mouth daily 2 tabs daily (Patient not taking: Reported on 2021), Disp: , Rfl:      hydrochlorothiazide (HYDRODIURIL) 50 MG tablet, Take 1 tablet by mouth once daily, Disp: 90 tablet, Rfl: 0     levothyroxine (SYNTHROID/LEVOTHROID) 25 MCG tablet, TAKE 1 TABLET (25 MCG) BY MOUTH ONCE DAILY, Disp: 90 tablet, Rfl: 1     losartan (COZAAR) 100 MG tablet, Take 1 tablet by mouth once daily, Disp: 90 tablet, Rfl: 1     metFORMIN (GLUCOPHAGE) 1000 MG tablet, Take one tablet three times weekly., Disp: 63 tablet, Rfl: 3     metoprolol succinate ER (TOPROL-XL) 25 MG 24 hr tablet, 75 mg ( 3 tabs ) po daily, Disp: 270 tablet, Rfl: 1     multivitamin w/minerals (MULTI-VITAMIN) tablet, Take 1 tablet by mouth daily, Disp: , Rfl:      omeprazole (PRILOSEC) 20 MG DR capsule, TAKE 1 CAPSULE BY MOUTH ONCE DAILY 30 TO 60 MINUTES BEFORE A MEAL, Disp: 90 capsule, Rfl: 0     spironolactone (ALDACTONE) 25 MG tablet, Take 1 tablet (25 mg) by mouth daily, Disp: 90 tablet, Rfl: 1    Allergies -   Allergies   Allergen Reactions     Influenza Vaccines Other (See Comments)     delirium  fever         Social History -   Social History     Socioeconomic History     Marital status: Single     Spouse name: Not on file     Number of children: 0     Years of education: 18     Highest education level: Not on file   Occupational History     Occupation: Contractor     Employer: SELF     Comment: Not working now   Tobacco Use     Smoking status: Former Smoker     Packs/day: 0.00     Years: 5.00     Pack years: 0.00     Types: Cigarettes     Start date: 1990     Quit date: 1999     Years since quittin.7     Smokeless tobacco: Never Used   Substance and Sexual Activity     Alcohol use: Yes     Comment: rare     Drug use: No     Sexual activity: Not  Currently     Partners: Female   Other Topics Concern     Parent/sibling w/ CABG, MI or angioplasty before 65F 55M? No   Social History Narrative     Not on file     Social Determinants of Health     Financial Resource Strain:      Difficulty of Paying Living Expenses:    Food Insecurity:      Worried About Running Out of Food in the Last Year:      Ran Out of Food in the Last Year:    Transportation Needs:      Lack of Transportation (Medical):      Lack of Transportation (Non-Medical):    Physical Activity:      Days of Exercise per Week:      Minutes of Exercise per Session:    Stress:      Feeling of Stress :    Social Connections:      Frequency of Communication with Friends and Family:      Frequency of Social Gatherings with Friends and Family:      Attends [employer] Services:      Active Member of Clubs or Organizations:      Attends Club or Organization Meetings:      Marital Status:    Intimate Partner Violence:      Fear of Current or Ex-Partner:      Emotionally Abused:      Physically Abused:      Sexually Abused:        Physical Exam  BP (!) 153/76   Pulse 62   Resp 16   SpO2 97%   General - The patient is in no distress.  Alert and oriented to person and place, answers questions and cooperates with examination appropriately.   Voice and Breathing - The patient was breathing comfortably without the use of accessory muscles. There was no wheezing, stridor, or stertor.  The patients voice was clear and strong.  Ears - The auricles are normal. The tympanic membranes are normal in appearance, bony landmarks are intact.  No retraction, perforation, or masses.  No fluid or purulence was seen in the external canal or the middle ear. No evidence of infection of the middle ear or external canal, cerumen was normal in appearance.  Eyes - Extraocular movements intact.  Sclera were not icteric or injected.  Neck - Soft, non-tender.  The left lateral neck deep to the SCM appears to have a 1.5 cm soft mobile  mass.  Given the negative CT findings this is likely consistent with a lipoma.  I feel no other masses in the neck.  No lymphadenopathy.  Neurological - Cranial nerves 2 through 12 were grossly intact. House-Brackmann grade 1 out of 6 bilaterally.       Audiologic Studies - An audiogram and tympanogram were performed today as part of the evaluation and personally reviewed. The tympanogram shows a normal Type A curve, with normal canal volume and middle ear pressure.  There is no sign of eustachian tube dysfunction or middle ear effusion.  The audiogram showed a significant down-sloping mid to high frequency sensorineural hearing loss.  There was no evidence of conductive hearing loss or significant asymmetry.  When compared to his audiogram from 2015 this has worsened in the highest tones.      Assessment and Plan - Gucci Logan is a 71 year old male who presents to me today with hearing loss.  This is most consistent with presbycusis and noise exposure. I can find no evidence of serious CNS disorders or other complicating factors that could be causing this.  We spent the remainder of today's visit on education. We discussed hearing protection in noisy environments.    The patient will follow up as necessary for worsening symptoms or changes in symptoms. I have also recommended yearly audiograms. I recommend a hearing aid consultation and hearing aids.    He has a likely left lateral neck lipoma, 1.5 cm in size, deep to the SCM.  I recommend observation. Return if this changes.    Dudley Adams MD  Otolaryngology  [facility]          Again, thank you for allowing me to participate in the care of your patient.        Sincerely,        Dudley Adams MD

## 2021-09-30 ENCOUNTER — TELEPHONE (OUTPATIENT)
Dept: FAMILY MEDICINE | Facility: CLINIC | Age: 71
End: 2021-09-30

## 2021-09-30 DIAGNOSIS — M25.512 LEFT SHOULDER PAIN, UNSPECIFIED CHRONICITY: ICD-10-CM

## 2021-09-30 DIAGNOSIS — M25.561 RIGHT KNEE PAIN, UNSPECIFIED CHRONICITY: ICD-10-CM

## 2021-09-30 RX ORDER — HYDROCODONE BITARTRATE AND ACETAMINOPHEN 5; 325 MG/1; MG/1
1 TABLET ORAL EVERY 6 HOURS PRN
Qty: 10 TABLET | Refills: 0 | Status: SHIPPED | OUTPATIENT
Start: 2021-09-30 | End: 2022-08-15

## 2021-09-30 NOTE — TELEPHONE ENCOUNTER
Controlled Substance Refill Request for norco 5/325mg  Problem List Complete:  No     PROVIDER TO CONSIDER COMPLETION OF PROBLEM LIST AND OVERVIEW/CONTROLLED SUBSTANCE AGREEMENT    Last Written Prescription Date:  3/29/19  Last Fill Quantity: 24,   # refills: 0    THE MOST RECENT OFFICE VISIT MUST BE WITHIN THE PAST 3 MONTHS. AT LEAST ONE FACE TO FACE VISIT MUST OCCUR EVERY 6 MONTHS. ADDITIONAL VISITS CAN BE VIRTUAL.  (THIS STATEMENT SHOULD BE DELETED.)    Last Office Visit with [facility] primary care provider: 8/26/21    Future Office visit:     Controlled substance agreement:   Encounter-Level CSA:    There are no encounter-level csa.     Patient-Level CSA:    There are no patient-level csa.         Last Urine Drug Screen: No results found for: CDAUT, No results found for: COMDAT, No results found for: THC13, PCP13, COC13, MAMP13, OPI13, AMP13, BZO13, TCA13, MTD13, BAR13, OXY13, PPX13, BUP13     Processing:  Rx to be electronically transmitted to pharmacy by provider      [email or web address]       checked in past 3 months?  No, route to RN

## 2021-09-30 NOTE — LETTER
[facility]  [address] [facility] [address]  [phone]          October 6, 2021    Gucci Logan                                                                                                                     [address] [place] [id number]            Dear Gucci,    We have been unsuccessful reaching you by telephone. Please call the clinic at [phone] to schedule an appointment with a provider or let us know if you are getting care elsewhere.        Sincerely,         Richi Jaramillo MD

## 2021-10-06 NOTE — TELEPHONE ENCOUNTER
Called patient to schedule. VM not set up yet. Unable to leave message. Will send letter     Sanjeev-

## 2021-10-23 ENCOUNTER — TRANSFERRED RECORDS (OUTPATIENT)
Dept: HEALTH INFORMATION MANAGEMENT | Facility: CLINIC | Age: 71
End: 2021-10-23
Payer: COMMERCIAL

## 2021-10-26 ENCOUNTER — TRANSFERRED RECORDS (OUTPATIENT)
Dept: HEALTH INFORMATION MANAGEMENT | Facility: CLINIC | Age: 71
End: 2021-10-26
Payer: COMMERCIAL

## 2021-10-28 ENCOUNTER — TELEPHONE (OUTPATIENT)
Dept: FAMILY MEDICINE | Facility: CLINIC | Age: 71
End: 2021-10-28

## 2021-10-28 DIAGNOSIS — E03.9 HYPOTHYROIDISM, UNSPECIFIED TYPE: ICD-10-CM

## 2021-10-28 DIAGNOSIS — E11.43 TYPE 2 DIABETES MELLITUS WITH DIABETIC AUTONOMIC NEUROPATHY, WITHOUT LONG-TERM CURRENT USE OF INSULIN (H): Primary | ICD-10-CM

## 2021-10-28 DIAGNOSIS — R53.83 FATIGUE, UNSPECIFIED TYPE: ICD-10-CM

## 2021-10-28 DIAGNOSIS — E78.5 HYPERLIPIDEMIA LDL GOAL <100: ICD-10-CM

## 2021-10-28 DIAGNOSIS — Z12.5 SCREENING FOR PROSTATE CANCER: ICD-10-CM

## 2021-10-28 NOTE — TELEPHONE ENCOUNTER
Reason for Call: Request for an order or referral:    Order or referral being requested: Labs PSA and blood     Date needed: by next week    Has the patient been seen by the PCP for this problem? YES    Additional comments:  Has appt on Nov 3rd    Phone number Patient can be reached at:  Home number on file [phone] (home)    Best Time:  any    Can we leave a detailed message on this number?  YES    Call taken on 10/28/2021 at 2:12 PM by Tomasa Balderas

## 2021-11-03 ENCOUNTER — ANCILLARY PROCEDURE (OUTPATIENT)
Dept: GENERAL RADIOLOGY | Facility: CLINIC | Age: 71
End: 2021-11-03
Attending: ORTHOPAEDIC SURGERY
Payer: COMMERCIAL

## 2021-11-03 ENCOUNTER — TELEPHONE (OUTPATIENT)
Dept: ORTHOPEDICS | Facility: CLINIC | Age: 71
End: 2021-11-03

## 2021-11-03 ENCOUNTER — OFFICE VISIT (OUTPATIENT)
Dept: ORTHOPEDICS | Facility: CLINIC | Age: 71
End: 2021-11-03
Payer: COMMERCIAL

## 2021-11-03 VITALS
WEIGHT: 253.6 LBS | HEART RATE: 64 BPM | HEIGHT: 71 IN | DIASTOLIC BLOOD PRESSURE: 75 MMHG | BODY MASS INDEX: 35.5 KG/M2 | OXYGEN SATURATION: 94 % | SYSTOLIC BLOOD PRESSURE: 152 MMHG

## 2021-11-03 DIAGNOSIS — M79.642 BILATERAL HAND PAIN: Primary | ICD-10-CM

## 2021-11-03 DIAGNOSIS — M79.641 BILATERAL HAND PAIN: Primary | ICD-10-CM

## 2021-11-03 DIAGNOSIS — M67.441 DIGITAL MUCOUS CYST OF FINGER OF RIGHT HAND: ICD-10-CM

## 2021-11-03 DIAGNOSIS — M72.0 DUPUYTREN'S DISEASE OF PALM OF BOTH HANDS: ICD-10-CM

## 2021-11-03 DIAGNOSIS — M79.641 BILATERAL HAND PAIN: ICD-10-CM

## 2021-11-03 DIAGNOSIS — M79.642 BILATERAL HAND PAIN: ICD-10-CM

## 2021-11-03 DIAGNOSIS — S60.459A FOREIGN BODY OF FINGER OF LEFT HAND, INITIAL ENCOUNTER: ICD-10-CM

## 2021-11-03 PROCEDURE — 99213 OFFICE O/P EST LOW 20 MIN: CPT | Mod: 25 | Performed by: ORTHOPAEDIC SURGERY

## 2021-11-03 PROCEDURE — 20600 DRAIN/INJ JOINT/BURSA W/O US: CPT | Mod: F3 | Performed by: ORTHOPAEDIC SURGERY

## 2021-11-03 PROCEDURE — 73130 X-RAY EXAM OF HAND: CPT | Mod: LT | Performed by: RADIOLOGY

## 2021-11-03 ASSESSMENT — PAIN SCALES - GENERAL: PAINLEVEL: MODERATE PAIN (4)

## 2021-11-03 ASSESSMENT — MIFFLIN-ST. JEOR: SCORE: 1932.45

## 2021-11-03 NOTE — PROGRESS NOTES
"CHIEF COMPLAINT:   Chief Complaint   Patient presents with     Hand Pain     Bilateral hand pain - Dupytren's. Hands are getting worse. He is also having issues with his right long finger by nail bed. He is not sure if there is something in there. He has a little nodule in his left ring finger tip. He put a pin in it and got some drainage out.          HISTORY:  Gucci Logan is a 71 year old male , Right -hand dominant who is seen in followup for Bilateral hand pain for years, getting worse. The symptoms have been episodic. No treatments. He locates pain mostly in the palms, feels like there are \"cysts\" in the palm. Pain is worse with bending the small finger, using the hands.  Right long finger nailbed issues, not sure if something is in there. Also a nodule on the tip of the left ring finger, he poked with a pain and got something to come, out kind of solid paste. Still a little in there. He does a lot of work with wood, gets cuts and scrapes all the time.    Pain with pressure. No locking. Noticed \"nodules\" in the palms about a year ago. Occasional tingling.      Suspected cause: Due to unknown factors.    Pain severity: 4/10  Pain quality: dull  Frequency of symptoms: are constant.  Aggravating Factors: gripping, bending.  Relieving Factors: stretching.  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    Other PMH:  has a past medical history of Arthritis, Esophageal reflux (3/1/2014), Hearing problem, Hiatal hernia, Hypertension, Jaundice (5/31/2008), Liver disease, Obesity (1/24/2013), Obstructive sleep apnea, and Sleep apnea. He also has no past medical history of Complication of anesthesia, Difficult intubation, Malignant hyperthermia, Motion sickness, PONV (postoperative nausea and vomiting), or Spinal headache.  Patient Active Problem List    Diagnosis Date Noted     Abdominal pain 07/09/2020     Priority: Medium     Portal vein " thrombosis 07/09/2020     Priority: Medium     Type 2 diabetes mellitus with diabetic autonomic neuropathy, without long-term current use of insulin (H) 02/20/2020     Priority: Medium     Other cirrhosis of liver (H) 02/20/2020     Priority: Medium     Obesity (BMI 35.0-39.9) with comorbidity (H) 11/09/2018     Priority: Medium     Diabetes mellitus, type 2 (H) 11/09/2018     Priority: Medium     Hyperlipidemia LDL goal <100 04/19/2017     Priority: Medium     Impaired fasting glucose 04/19/2017     Priority: Medium     Gastroesophageal reflux disease, esophagitis presence not specified 10/06/2016     Priority: Medium     IMO Regulatory Load OCT 2020       Obesity, unspecified obesity severity, unspecified obesity type 12/17/2015     Priority: Medium     SHONDA (obstructive sleep apnea) 02/07/2014     Priority: Medium     Hypertension goal BP (blood pressure) < 140/90 02/08/2013     Priority: Medium     Advanced directives, counseling/discussion 01/16/2013     Priority: Medium     Advance Care Planning:   ACP Review and Resources Provided:  Reviewed chart for advance care plan.  Gucci Logan has no plan or code status on file however states presence of ACP document. Copy requested. Confirmed code status reflects current choices pending receipt of document/advance care plan review. Confirmed/documented designated decision maker(s). See permanent comments section of demographics in clinical tab.   Added by Elyssa Olguin on 7/22/2014  Patient states has Advance Directive and will bring in a copy to clinic. 1/16/2013              CARDIOVASCULAR SCREENING; LDL GOAL LESS THAN 130 01/16/2013     Priority: Medium     Vitamin D deficiency 09/19/2012     Priority: Medium     Overview:   9/19/2012       Osteoarthrosis 09/18/2012     Priority: Medium     Overview:   Lumbar spine, knees       Left ventricular hypertrophy 11/18/2011     Priority: Medium     Overview:   11/18/2011       Thrombocytopenia (H) 08/28/2008      Priority: Medium     Overview:   8/28/2008, attributed to liver disease with portal hypertension and functional splenomegaly       Splenomegaly 07/25/2008     Priority: Medium     Overview:   7/25/2009, attributed to portal hypertension from cirrhosis       Lymphadenopathy 06/20/2008     Priority: Medium     Overview:   6/20/2008 5/31/2011, CT: Increasing mediastinal and hilar lymphadenopathy and mild increase in perigastric lymph node. Findings are concerning for atypical infectious process. In the absence of pulmonary findings, with lymphomatous disorder in the differential. Clinical correlation is recommended.       Jaundice 05/31/2008     Priority: Medium     Chronic hepatitis C virus infection (H) 05/31/2008     Priority: Medium         Surgical Hx:  has a past surgical history that includes SPINAL FUSION,ANT,EA ADNL LEVEL; OPEN RX ANKLE DISLOCATN+FIXATN; arthroscopy knee rt/lt; Endoscopic endonasal surgery (1994); rotator cuff repair rt/lt (Right); Hernia surgery (Left, 1994); endoscopy (2-19-15); nasal/sinus polypectomy; Eye surgery; cataract iol, rt/lt (Nov and Dec 2017); Repair ptosis (Left, 10/16/2018); Repair Ptosis Brow (Bilateral, 10/16/2018); Dacryocystorhinostomy (Left, 10/16/2018); Esophagoscopy, gastroscopy, duodenoscopy (EGD), combined (N/A, 4/24/2019); and Colonoscopy (N/A, 4/24/2019).    Medications:   Current Outpatient Medications:      aspirin (ASA) 81 MG chewable tablet, Take 81 mg by mouth daily, Disp: , Rfl:      atorvastatin (LIPITOR) 10 MG tablet, Take 1 tablet by mouth once daily, Disp: 90 tablet, Rfl: 0     cholecalciferol 25 MCG (1000 UT) TABS, Take 1,000 Units by mouth daily 2 tabs daily (Patient not taking: Reported on 9/1/2021), Disp: , Rfl:      hydrochlorothiazide (HYDRODIURIL) 50 MG tablet, Take 1 tablet by mouth once daily, Disp: 90 tablet, Rfl: 0     HYDROcodone-acetaminophen (NORCO) 5-325 MG tablet, Take 1 tablet by mouth every 6 hours as needed for moderate to severe  pain, Disp: 10 tablet, Rfl: 0     levothyroxine (SYNTHROID/LEVOTHROID) 25 MCG tablet, TAKE 1 TABLET (25 MCG) BY MOUTH ONCE DAILY, Disp: 90 tablet, Rfl: 1     losartan (COZAAR) 100 MG tablet, Take 1 tablet by mouth once daily, Disp: 90 tablet, Rfl: 0     metFORMIN (GLUCOPHAGE) 1000 MG tablet, Take one tablet three times weekly., Disp: 63 tablet, Rfl: 3     metoprolol succinate ER (TOPROL-XL) 25 MG 24 hr tablet, Take 3 tablets by mouth once daily, Disp: 270 tablet, Rfl: 0     multivitamin w/minerals (MULTI-VITAMIN) tablet, Take 1 tablet by mouth daily, Disp: , Rfl:      omeprazole (PRILOSEC) 20 MG DR capsule, TAKE 1 CAPSULE BY MOUTH ONCE DAILY 30 TO 60 MINUTES BEFORE A MEAL, Disp: 90 capsule, Rfl: 0     spironolactone (ALDACTONE) 25 MG tablet, Take 1 tablet (25 mg) by mouth daily, Disp: 90 tablet, Rfl: 1    Allergies:   Allergies   Allergen Reactions     Influenza Vaccines Other (See Comments)     delirium  fever         Social Hx: retired.  reports that he quit smoking about 22 years ago. His smoking use included cigarettes. He started smoking about 31 years ago. He smoked 0.00 packs per day for 5.00 years. He has never used smokeless tobacco. He reports current alcohol use. He reports that he does not use drugs.    Family Hx: family history includes Alzheimer Disease (age of onset: 85) in his mother; Cancer in his father; Cerebrovascular Disease (age of onset: 50) in his father; Dementia in his mother; Hypertension in his father.. Negative for bleeding/clotting disorders. Negative for adverse anesthesia reactions.    REVIEW OF SYSTEMS: 10 point ROS neg other than the symptoms noted above in the HPI and PMH. Notables include  CONSTITUTIONAL:NEGATIVE for fever, chills, change in weight  INTEGUMENTARY/SKIN: NEGATIVE for worrisome rashes, moles or lesions  MUSCULOSKELETAL:See HPI above  Neurology: see HPI above.      EXAM:  GENERAL APPEARANCE: healthy, alert and no distress   GAIT: NORMAL  SKIN: no suspicious lesions or  "rashes  RESPIRATORY: No increased work of breathing.  NEURO:     strength: decreased,    thenar fasiculations: negative    Thenar atrophy:Mild.    Sensation intact in all digits,    reflexes normal in upper extremities.   PSYCH:  mentation appears normal and affect normal, not anxious.    MUSCULOSKELETAL:    RIGHT HAND/FINGERS:    Skin intact. No abnormal skin discoloration, erythema or ecchymosis.   Degenerative changes of the IP joints.  There is a trough through the long finger nail bed radially, with cystic mass radial aspect DIP joint distally to the eponychial fold.    Normal wear pattern, color and tone.  Visible palpable cord/nodules right ring > small finger ray in the palm. Less so index and long finges  No contracture. Table top test negative.  There is mild tenderness in the areas of cord/nodules of the palm.  There is no ecchymosis.  There is no erythema of the surrounding skin.  There is no maceration of the skin.  There is no other deformity in the area.  Intact extensors. No extensor lag.      1st carpometacarpal grind: negative    Intact sensation light touch median, radial, ulnar nerves of the hand  Intact sensation to the radial and ulnar digital nerves of the fingers, as well as the finger tips.  Intact epl fpl fdp edc wrist flexion/extension biceps/triceps deltoid  Brisk capillary refill to all fingers.   Palpable radial pulse, 2+.      LEFT HAND/FINGERS:    Skin intact. No abnormal skin discoloration, erythema or ecchymosis.   Degenerative changes of the IP joints.  Pinpoint while lesion volar tip of the ring finger ulnarly. Appears to have slight whitish material with central \"puncture\" type appearance.  No nail pitting or clubbing.  Normal wear pattern, color and tone.  Visible palpable cord/nodules left ring < small finger ray in the palm.   No contracture. Table top test negative.  There is mild tenderness in the areas of cord/nodules of the palm.    There is no ecchymosis.  There is no " "erythema of the surrounding skin.  There is no maceration of the skin.  There is no other deformity in the area.  Intact extensors. No extensor lag.      1st carpometacarpal grind: negative    Intact sensation light touch median, radial, ulnar nerves of the hand  Intact sensation to the radial and ulnar digital nerves of the fingers, as well as the finger tips.  Intact epl fpl fdp edc wrist flexion/extension biceps/triceps deltoid  Brisk capillary refill to all fingers.   Palpable radial pulse, 2+.      XRAYS: 3 views bilateral hands 11/3/2021  reviewed, No obvious fracture or dislocation. No obvious bony abnormality or lesion.. right long finger DIP degenerative changes, dorsal osteophyte.      ASSESSMENT/PLAN: 72yo RHD male with:  1)  bilateral hand pain, dupuytrens disease without contracture.  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    * discussed with patient that the nodules/cords in the palms consistent with dupuytren's disease  * nonsurgical treatment at this time.  * right long finger likely digital mucous cyst causing compressionon nail bed, causing nail to become deformed.  * left ring finger likely foreign body/sliver, otherwise gout tophus. Discussed attempt at aspiration/foreign body removal. He elects to proceed. A small linear 3-4mm \"sliver\" was removed. No gross purulence. Wound covered with bandaid.    * will arrange for right long finger DIP joint cyst excision, osteophyte debridement. Outpatient procedure. Local anesthetic only.     * shouldn't need H+P if local only     * return to clinic 2 weeks postoperative for wound check, suture removal.        * Dupuytren's disease is essentially abnormal scar tissue of the normal fascial tissue in the palm (benign fibroproliferative disorder of the superficial palmar and digital fascia that can form subcutaneous nodules, cords, palmar pitting, webspace contractures and flexion " contractures)  * progression is usually slow over years to decades.  * associated with northern  descent, males > females, increased with age  * 10-40% family history  * ring finger most commonly involved, right hand more than left hand.  * can be seen in other areas of body (plantar foot, dorsum of PIP joints, penile fibromatosis)  * has been recognized with: diabetes mellitus, smoking, copd, HIV, alcohol, seizure disorders (anti-convulsants)   * treatment is usually observation with minimal or no contracture.  * if contracture develops and causes functional problems or pain, then treatment is either surgical (fasciotomy or fasciectomy) versus medical with enzymatic (collagenase) injections.  * also consider cortisone injections, splinting as other options for nonsurgical treatment.  * typical indications for surgery: contractures of metacarpal phalangeal joint >30 degrees or any PIP contracture.  * would recommend hand surgery referral in future as needed.            Edson Reza M.D., M.S.  Dept. of Orthopaedic Surgery  [facility]    Hand / Upper Extremity Injection/Arthrocentesis    Date/Time: 11/3/2021 10:11 AM  Performed by: Edson Reza MD  Authorized by: Edson Reza MD     Indications:  Pain  Needle Size:  18 G   Condition comment:  Foreign body removal    Aspirate amount (mL) comment:  Foreign body removed  Outcome:  Tolerated well, no immediate complications  Procedure discussed: discussed risks, benefits, and alternatives    Consent Given by:  Patient  Timeout: timeout called immediately prior to procedure    Prep: patient was prepped and draped in usual sterile fashion

## 2021-11-03 NOTE — LETTER
"    11/3/2021         RE: Gucci Logan  [address] [facility] [address]        Dear Colleague,    Thank you for referring your patient, Gucci Logan, to the [facility]. Please see a copy of my visit note below.    CHIEF COMPLAINT:   Chief Complaint   Patient presents with     Hand Pain     Bilateral hand pain - Dupytren's. Hands are getting worse. He is also having issues with his right long finger by nail bed. He is not sure if there is something in there. He has a little nodule in his left ring finger tip. He put a pin in it and got some drainage out.          HISTORY:  Gucci Logan is a 71 year old male , Right -hand dominant who is seen in followup for Bilateral hand pain for years, getting worse. The symptoms have been episodic. No treatments. He locates pain mostly in the palms, feels like there are \"cysts\" in the palm. Pain is worse with bending the small finger, using the hands.  Right long finger nailbed issues, not sure if something is in there. Also a nodule on the tip of the left ring finger, he poked with a pain and got something to come, out kind of solid paste. Still a little in there. He does a lot of work with wood, gets cuts and scrapes all the time.    Pain with pressure. No locking. Noticed \"nodules\" in the palms about a year ago. Occasional tingling.      Suspected cause: Due to unknown factors.    Pain severity: 4/10  Pain quality: dull  Frequency of symptoms: are constant.  Aggravating Factors: gripping, bending.  Relieving Factors: stretching.  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    Other PMH:  has a past medical history of Arthritis, Esophageal reflux (3/1/2014), Hearing problem, Hiatal hernia, Hypertension, Jaundice (5/31/2008), Liver disease, Obesity (1/24/2013), Obstructive sleep apnea, and Sleep apnea. He also has no past medical history of Complication of " anesthesia, Difficult intubation, Malignant hyperthermia, Motion sickness, PONV (postoperative nausea and vomiting), or Spinal headache.  Patient Active Problem List    Diagnosis Date Noted     Abdominal pain 07/09/2020     Priority: Medium     Portal vein thrombosis 07/09/2020     Priority: Medium     Type 2 diabetes mellitus with diabetic autonomic neuropathy, without long-term current use of insulin (H) 02/20/2020     Priority: Medium     Other cirrhosis of liver (H) 02/20/2020     Priority: Medium     Obesity (BMI 35.0-39.9) with comorbidity (H) 11/09/2018     Priority: Medium     Diabetes mellitus, type 2 (H) 11/09/2018     Priority: Medium     Hyperlipidemia LDL goal <100 04/19/2017     Priority: Medium     Impaired fasting glucose 04/19/2017     Priority: Medium     Gastroesophageal reflux disease, esophagitis presence not specified 10/06/2016     Priority: Medium     IMO Regulatory Load OCT 2020       Obesity, unspecified obesity severity, unspecified obesity type 12/17/2015     Priority: Medium     SHONDA (obstructive sleep apnea) 02/07/2014     Priority: Medium     Hypertension goal BP (blood pressure) < 140/90 02/08/2013     Priority: Medium     Advanced directives, counseling/discussion 01/16/2013     Priority: Medium     Advance Care Planning:   ACP Review and Resources Provided:  Reviewed chart for advance care plan.  Gucci Logan has no plan or code status on file however states presence of ACP document. Copy requested. Confirmed code status reflects current choices pending receipt of document/advance care plan review. Confirmed/documented designated decision maker(s). See permanent comments section of demographics in clinical tab.   Added by Elyssa Olguin on 7/22/2014  Patient states has Advance Directive and will bring in a copy to clinic. 1/16/2013              CARDIOVASCULAR SCREENING; LDL GOAL LESS THAN 130 01/16/2013     Priority: Medium     Vitamin D deficiency 09/19/2012      Priority: Medium     Overview:   9/19/2012       Osteoarthrosis 09/18/2012     Priority: Medium     Overview:   Lumbar spine, knees       Left ventricular hypertrophy 11/18/2011     Priority: Medium     Overview:   11/18/2011       Thrombocytopenia (H) 08/28/2008     Priority: Medium     Overview:   8/28/2008, attributed to liver disease with portal hypertension and functional splenomegaly       Splenomegaly 07/25/2008     Priority: Medium     Overview:   7/25/2009, attributed to portal hypertension from cirrhosis       Lymphadenopathy 06/20/2008     Priority: Medium     Overview:   6/20/2008 5/31/2011, CT: Increasing mediastinal and hilar lymphadenopathy and mild increase in perigastric lymph node. Findings are concerning for atypical infectious process. In the absence of pulmonary findings, with lymphomatous disorder in the differential. Clinical correlation is recommended.       Jaundice 05/31/2008     Priority: Medium     Chronic hepatitis C virus infection (H) 05/31/2008     Priority: Medium         Surgical Hx:  has a past surgical history that includes SPINAL FUSION,ANT,EA ADNL LEVEL; OPEN RX ANKLE DISLOCATN+FIXATN; arthroscopy knee rt/lt; Endoscopic endonasal surgery (1994); rotator cuff repair rt/lt (Right); Hernia surgery (Left, 1994); endoscopy (2-19-15); nasal/sinus polypectomy; Eye surgery; cataract iol, rt/lt (Nov and Dec 2017); Repair ptosis (Left, 10/16/2018); Repair Ptosis Brow (Bilateral, 10/16/2018); Dacryocystorhinostomy (Left, 10/16/2018); Esophagoscopy, gastroscopy, duodenoscopy (EGD), combined (N/A, 4/24/2019); and Colonoscopy (N/A, 4/24/2019).    Medications:   Current Outpatient Medications:      aspirin (ASA) 81 MG chewable tablet, Take 81 mg by mouth daily, Disp: , Rfl:      atorvastatin (LIPITOR) 10 MG tablet, Take 1 tablet by mouth once daily, Disp: 90 tablet, Rfl: 0     cholecalciferol 25 MCG (1000 UT) TABS, Take 1,000 Units by mouth daily 2 tabs daily (Patient not taking: Reported on  9/1/2021), Disp: , Rfl:      hydrochlorothiazide (HYDRODIURIL) 50 MG tablet, Take 1 tablet by mouth once daily, Disp: 90 tablet, Rfl: 0     HYDROcodone-acetaminophen (NORCO) 5-325 MG tablet, Take 1 tablet by mouth every 6 hours as needed for moderate to severe pain, Disp: 10 tablet, Rfl: 0     levothyroxine (SYNTHROID/LEVOTHROID) 25 MCG tablet, TAKE 1 TABLET (25 MCG) BY MOUTH ONCE DAILY, Disp: 90 tablet, Rfl: 1     losartan (COZAAR) 100 MG tablet, Take 1 tablet by mouth once daily, Disp: 90 tablet, Rfl: 0     metFORMIN (GLUCOPHAGE) 1000 MG tablet, Take one tablet three times weekly., Disp: 63 tablet, Rfl: 3     metoprolol succinate ER (TOPROL-XL) 25 MG 24 hr tablet, Take 3 tablets by mouth once daily, Disp: 270 tablet, Rfl: 0     multivitamin w/minerals (MULTI-VITAMIN) tablet, Take 1 tablet by mouth daily, Disp: , Rfl:      omeprazole (PRILOSEC) 20 MG DR capsule, TAKE 1 CAPSULE BY MOUTH ONCE DAILY 30 TO 60 MINUTES BEFORE A MEAL, Disp: 90 capsule, Rfl: 0     spironolactone (ALDACTONE) 25 MG tablet, Take 1 tablet (25 mg) by mouth daily, Disp: 90 tablet, Rfl: 1    Allergies:   Allergies   Allergen Reactions     Influenza Vaccines Other (See Comments)     delirium  fever         Social Hx: retired.  reports that he quit smoking about 22 years ago. His smoking use included cigarettes. He started smoking about 31 years ago. He smoked 0.00 packs per day for 5.00 years. He has never used smokeless tobacco. He reports current alcohol use. He reports that he does not use drugs.    Family Hx: family history includes Alzheimer Disease (age of onset: 85) in his mother; Cancer in his father; Cerebrovascular Disease (age of onset: 50) in his father; Dementia in his mother; Hypertension in his father.. Negative for bleeding/clotting disorders. Negative for adverse anesthesia reactions.    REVIEW OF SYSTEMS: 10 point ROS neg other than the symptoms noted above in the HPI and PMH. Notables include  CONSTITUTIONAL:NEGATIVE for fever,  "chills, change in weight  INTEGUMENTARY/SKIN: NEGATIVE for worrisome rashes, moles or lesions  MUSCULOSKELETAL:See HPI above  Neurology: see HPI above.      EXAM:  GENERAL APPEARANCE: healthy, alert and no distress   GAIT: NORMAL  SKIN: no suspicious lesions or rashes  RESPIRATORY: No increased work of breathing.  NEURO:     strength: decreased,    thenar fasiculations: negative    Thenar atrophy:Mild.    Sensation intact in all digits,    reflexes normal in upper extremities.   PSYCH:  mentation appears normal and affect normal, not anxious.    MUSCULOSKELETAL:    RIGHT HAND/FINGERS:    Skin intact. No abnormal skin discoloration, erythema or ecchymosis.   Degenerative changes of the IP joints.  There is a trough through the long finger nail bed radially, with cystic mass radial aspect DIP joint distally to the eponychial fold.    Normal wear pattern, color and tone.  Visible palpable cord/nodules right ring > small finger ray in the palm. Less so index and long finges  No contracture. Table top test negative.  There is mild tenderness in the areas of cord/nodules of the palm.  There is no ecchymosis.  There is no erythema of the surrounding skin.  There is no maceration of the skin.  There is no other deformity in the area.  Intact extensors. No extensor lag.      1st carpometacarpal grind: negative    Intact sensation light touch median, radial, ulnar nerves of the hand  Intact sensation to the radial and ulnar digital nerves of the fingers, as well as the finger tips.  Intact epl fpl fdp edc wrist flexion/extension biceps/triceps deltoid  Brisk capillary refill to all fingers.   Palpable radial pulse, 2+.      LEFT HAND/FINGERS:    Skin intact. No abnormal skin discoloration, erythema or ecchymosis.   Degenerative changes of the IP joints.  Pinpoint while lesion volar tip of the ring finger ulnarly. Appears to have slight whitish material with central \"puncture\" type appearance.  No nail pitting or " "clubbing.  Normal wear pattern, color and tone.  Visible palpable cord/nodules left ring < small finger ray in the palm.   No contracture. Table top test negative.  There is mild tenderness in the areas of cord/nodules of the palm.    There is no ecchymosis.  There is no erythema of the surrounding skin.  There is no maceration of the skin.  There is no other deformity in the area.  Intact extensors. No extensor lag.      1st carpometacarpal grind: negative    Intact sensation light touch median, radial, ulnar nerves of the hand  Intact sensation to the radial and ulnar digital nerves of the fingers, as well as the finger tips.  Intact epl fpl fdp edc wrist flexion/extension biceps/triceps deltoid  Brisk capillary refill to all fingers.   Palpable radial pulse, 2+.      XRAYS: 3 views bilateral hands 11/3/2021  reviewed, No obvious fracture or dislocation. No obvious bony abnormality or lesion.. right long finger DIP degenerative changes, dorsal osteophyte.      ASSESSMENT/PLAN: 70yo RHD male with:  1)  bilateral hand pain, dupuytrens disease without contracture.  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    * discussed with patient that the nodules/cords in the palms consistent with dupuytren's disease  * nonsurgical treatment at this time.  * right long finger likely digital mucous cyst causing compressionon nail bed, causing nail to become deformed.  * left ring finger likely foreign body/sliver, otherwise gout tophus. Discussed attempt at aspiration/foreign body removal. He elects to proceed. A small linear 3-4mm \"sliver\" was removed. No gross purulence. Wound covered with bandaid.    * will arrange for right long finger DIP joint cyst excision, osteophyte debridement. Outpatient procedure. Local anesthetic only.     * shouldn't need H+P if local only     * return to clinic 2 weeks postoperative for wound check, suture removal.        * " Dupuytren's disease is essentially abnormal scar tissue of the normal fascial tissue in the palm (benign fibroproliferative disorder of the superficial palmar and digital fascia that can form subcutaneous nodules, cords, palmar pitting, webspace contractures and flexion contractures)  * progression is usually slow over years to decades.  * associated with northern  descent, males > females, increased with age  * 10-40% family history  * ring finger most commonly involved, right hand more than left hand.  * can be seen in other areas of body (plantar foot, dorsum of PIP joints, penile fibromatosis)  * has been recognized with: diabetes mellitus, smoking, copd, HIV, alcohol, seizure disorders (anti-convulsants)   * treatment is usually observation with minimal or no contracture.  * if contracture develops and causes functional problems or pain, then treatment is either surgical (fasciotomy or fasciectomy) versus medical with enzymatic (collagenase) injections.  * also consider cortisone injections, splinting as other options for nonsurgical treatment.  * typical indications for surgery: contractures of metacarpal phalangeal joint >30 degrees or any PIP contracture.  * would recommend hand surgery referral in future as needed.            Edson Reza M.D., M.S.  Dept. of Orthopaedic Surgery  [facility]    Hand / Upper Extremity Injection/Arthrocentesis    Date/Time: 11/3/2021 10:11 AM  Performed by: Edson Reza MD  Authorized by: Edson Reza MD     Indications:  Pain  Needle Size:  18 G   Condition comment:  Foreign body removal    Aspirate amount (mL) comment:  Foreign body removed  Outcome:  Tolerated well, no immediate complications  Procedure discussed: discussed risks, benefits, and alternatives    Consent Given by:  Patient  Timeout: timeout called immediately prior to procedure    Prep: patient was prepped and draped in usual sterile fashion              Again, thank you  for allowing me to participate in the care of your patient.        Sincerely,        Edson Reza MD

## 2021-11-05 ENCOUNTER — ANCILLARY PROCEDURE (OUTPATIENT)
Dept: GENERAL RADIOLOGY | Facility: CLINIC | Age: 71
End: 2021-11-05
Attending: ORTHOPAEDIC SURGERY
Payer: COMMERCIAL

## 2021-11-05 ENCOUNTER — OFFICE VISIT (OUTPATIENT)
Dept: ORTHOPEDICS | Facility: CLINIC | Age: 71
End: 2021-11-05
Payer: COMMERCIAL

## 2021-11-05 VITALS
SYSTOLIC BLOOD PRESSURE: 145 MMHG | WEIGHT: 253 LBS | HEIGHT: 71 IN | BODY MASS INDEX: 35.42 KG/M2 | DIASTOLIC BLOOD PRESSURE: 78 MMHG

## 2021-11-05 DIAGNOSIS — M25.552 BILATERAL HIP PAIN: ICD-10-CM

## 2021-11-05 DIAGNOSIS — M25.551 BILATERAL HIP PAIN: Primary | ICD-10-CM

## 2021-11-05 DIAGNOSIS — M54.50 CHRONIC BILATERAL LOW BACK PAIN WITHOUT SCIATICA: ICD-10-CM

## 2021-11-05 DIAGNOSIS — M25.552 BILATERAL HIP PAIN: Primary | ICD-10-CM

## 2021-11-05 DIAGNOSIS — G89.29 CHRONIC BILATERAL LOW BACK PAIN WITHOUT SCIATICA: ICD-10-CM

## 2021-11-05 DIAGNOSIS — M25.551 BILATERAL HIP PAIN: ICD-10-CM

## 2021-11-05 PROCEDURE — 73523 X-RAY EXAM HIPS BI 5/> VIEWS: CPT | Performed by: RADIOLOGY

## 2021-11-05 PROCEDURE — 99214 OFFICE O/P EST MOD 30 MIN: CPT | Performed by: ORTHOPAEDIC SURGERY

## 2021-11-05 ASSESSMENT — PAIN SCALES - GENERAL: PAINLEVEL: MILD PAIN (3)

## 2021-11-05 ASSESSMENT — MIFFLIN-ST. JEOR: SCORE: 1929.73

## 2021-11-05 NOTE — LETTER
"    11/5/2021         RE: Gucci Logan  [address] [facility] [address]        Dear Colleague,    Thank you for referring your patient, Gucci Logan, to the [facility]. Please see a copy of my visit note below.    Chief Complaint:   Chief Complaint   Patient presents with     Hip Pain     Bilateral hip pain. Onset: years, but has gotten worse recently. NKI. He has been doing a lot of outside work. Pain is mostly on the side. He has trouble getting to sleep because of pain. He is always cramping. Didn't really say he had any groin pain. No tx.        HISTORY OF PRESENT ILLNESS    Gucci Logan is a 71 year old male seen for evaluation of ongoing bilateral hip pain with no known injury.   Pain has been present for years, worse recently, relates to doing a lot of work outside. Worse after a long day on feet, at night. He's having trouble getting to sleep because of the pain. He has \"cramping\". Locates pain side of hips, buttock and low back.  no groin pain. Treatment has been nothing. Better with wearing work boots. Will do some home stretching of his back, seems to help.    Feels like right leg is shorter compared to the left , relates to right ankle injury.    Also has right foot drop, neuropathy.    Does have low back pain, reports history of surgery.    Present symptoms: pain laterally and posteriorly, pain dull/achy , mild pain.    Pain severity: 3/10  Frequency of symptoms: frequently  Exacerbating Factors: prolonged activities, laying on back  Relieving Factors: positional changes, stretching  Night Pain: Yes  Pain while at rest: No   Associated numbness or tingling: unrelated to hip       Other PMH:  has a past medical history of Arthritis, Esophageal reflux (3/1/2014), Hearing problem, Hiatal hernia, Hypertension, Jaundice (5/31/2008), Liver disease, Obesity (1/24/2013), Obstructive sleep apnea, and Sleep apnea. He also has no past medical history of Complication " of anesthesia, Difficult intubation, Malignant hyperthermia, Motion sickness, PONV (postoperative nausea and vomiting), or Spinal headache.  Patient Active Problem List   Diagnosis     Advanced directives, counseling/discussion     CARDIOVASCULAR SCREENING; LDL GOAL LESS THAN 130     Hypertension goal BP (blood pressure) < 140/90     Obesity, unspecified obesity severity, unspecified obesity type     Gastroesophageal reflux disease, esophagitis presence not specified     Hyperlipidemia LDL goal <100     Impaired fasting glucose     Obesity (BMI 35.0-39.9) with comorbidity (H)     Diabetes mellitus, type 2 (H)     Type 2 diabetes mellitus with diabetic autonomic neuropathy, without long-term current use of insulin (H)     Other cirrhosis of liver (H)     Abdominal pain     Portal vein thrombosis     Vitamin D deficiency     Thrombocytopenia (H)     Splenomegaly     Osteoarthrosis     SHONDA (obstructive sleep apnea)     Lymphadenopathy     Left ventricular hypertrophy     Jaundice     Chronic hepatitis C virus infection (H)       Surgical Hx:  has a past surgical history that includes SPINAL FUSION,ANT,EA ADNL LEVEL; OPEN RX ANKLE DISLOCATN+FIXATN; arthroscopy knee rt/lt; Endoscopic endonasal surgery (1994); rotator cuff repair rt/lt (Right); Hernia surgery (Left, 1994); endoscopy (2-19-15); nasal/sinus polypectomy; Eye surgery; cataract iol, rt/lt (Nov and Dec 2017); Repair ptosis (Left, 10/16/2018); Repair Ptosis Brow (Bilateral, 10/16/2018); Dacryocystorhinostomy (Left, 10/16/2018); Esophagoscopy, gastroscopy, duodenoscopy (EGD), combined (N/A, 4/24/2019); and Colonoscopy (N/A, 4/24/2019).    Medications:   Current Outpatient Medications:      aspirin (ASA) 81 MG chewable tablet, Take 81 mg by mouth daily, Disp: , Rfl:      atorvastatin (LIPITOR) 10 MG tablet, Take 1 tablet by mouth once daily, Disp: 90 tablet, Rfl: 0     hydrochlorothiazide (HYDRODIURIL) 50 MG tablet, Take 1 tablet by mouth once daily, Disp: 90  tablet, Rfl: 0     levothyroxine (SYNTHROID/LEVOTHROID) 25 MCG tablet, TAKE 1 TABLET (25 MCG) BY MOUTH ONCE DAILY, Disp: 90 tablet, Rfl: 1     losartan (COZAAR) 100 MG tablet, Take 1 tablet by mouth once daily, Disp: 90 tablet, Rfl: 0     metFORMIN (GLUCOPHAGE) 1000 MG tablet, Take one tablet three times weekly., Disp: 63 tablet, Rfl: 3     metoprolol succinate ER (TOPROL-XL) 25 MG 24 hr tablet, Take 3 tablets by mouth once daily, Disp: 270 tablet, Rfl: 0     multivitamin w/minerals (MULTI-VITAMIN) tablet, Take 1 tablet by mouth daily, Disp: , Rfl:      omeprazole (PRILOSEC) 20 MG DR capsule, TAKE 1 CAPSULE BY MOUTH ONCE DAILY 30 TO 60 MINUTES BEFORE A MEAL, Disp: 90 capsule, Rfl: 0     spironolactone (ALDACTONE) 25 MG tablet, Take 1 tablet (25 mg) by mouth daily, Disp: 90 tablet, Rfl: 1     cholecalciferol 25 MCG (1000 UT) TABS, Take 1,000 Units by mouth daily 2 tabs daily (Patient not taking: Reported on 9/1/2021), Disp: , Rfl:      HYDROcodone-acetaminophen (NORCO) 5-325 MG tablet, Take 1 tablet by mouth every 6 hours as needed for moderate to severe pain (Patient not taking: Reported on 11/3/2021), Disp: 10 tablet, Rfl: 0    Allergies:   Allergies   Allergen Reactions     Influenza Vaccines Other (See Comments)     delirium  fever         Social Hx: retired.  reports that he quit smoking about 22 years ago. His smoking use included cigarettes. He started smoking about 31 years ago. He smoked 0.00 packs per day for 5.00 years. He has never used smokeless tobacco. He reports current alcohol use. He reports that he does not use drugs.    Family Hx: family history includes Alzheimer Disease (age of onset: 85) in his mother; Cancer in his father; Cerebrovascular Disease (age of onset: 50) in his father; Dementia in his mother; Hypertension in his father.    REVIEW OF SYSTEMS:  10 point ROS neg other than the symptoms noted above in the HPI and PAST MEDICAL HISTORY. Notables,  CONSTITUTIONAL:NEGATIVE for fever,  "chills, change in weight  INTEGUMENTARY/SKIN: NEGATIVE for worrisome rashes, moles or lesions  MUSCULOSKELETAL:See HPI above  NEURO: NEGATIVE for weakness, dizziness or paresthesias    PHYSICAL EXAM:  BP (!) 145/78   Ht 1.803 m (5' 11\")   Wt 114.8 kg (253 lb)   BMI 35.29 kg/m     GENERAL APPEARANCE: healthy, alert, no distress  SKIN: no suspicious lesions or rashes  NEURO: Normal strength and tone, mentation intact and speech normal  PSYCH:  mentation appears normal and affect normal  RESPIRATORY: No increased work of breathing.  LYMPH: no palpable inguinal lymph nodes.    BILATERAL LOWER EXTREMITIES:  Gait: favors the right, leans to the right, right foot drop.  varus alignment.  No gross deformities or masses.  Bilateral Quad atrophy, strength weak  Decreased sensation bilateral feet, right more than left.  Inability to dorsiflexion right foot, left foot otherwise Intact EHL, EDL, TA, FHL, GS, quadriceps hamstrings and hip flexors  Toes warm and well perfused, brisk capillary refill. Palpable 2+ dp pulses.  Feet of skin are dry. There is healing ulceration to the lateral side of the right small toe.  Bilateral calf soft and nttp or squeeze.  DTRs: achilles 2+, patella 2+.  Edema: 1+  Leg lengths: right appears short compared to the left at medial malleolus.      BACK EXAM  Tender to palpation middle and right > left low back and buttocks, SI joints.    LEFT HIP EXAM:      Palpation: Tender:   left greater trochanter, left gluteus medius, left iliac crest   Nontender: left groin  Strength:  4/5  Special tests:  Irritability (flexion/adduction/internal rotation) negative.  Hip range of motion: Internal rotation: 10, External rotation: 45, Flexion 95, all pain free      RIGHT HIP EXAM:    Palpation: Tender:   right greater trochanter, right gluteus medius, right iliac crest, right SI joint   Nontender: right groin  Strength:  4/5  Special tests:  Irritability (flexion/adduction/internal rotation) equivocal for " lateral pain.  Hip range of motion: Internal rotation: 10, External rotation: 45, Flexion 95        X-RAY: AP pelvis and AP/Lateral views bilateral hip from 11/5/2021 were reviewed in clinic today. No obvious fractures or dislocations. Mild right > left hip narrowing. No significant degenerative changes. Bilateral cam deformity.      Impression: 72yo male with chronic bilateral hip pain, most likely is low back pain.    Plan:   * exam, images reviewed.  * no significant hip osteoarthritis noted  * think more coming from the low back.  * recommend continued stretching and home exercise program, core exercises  * heat, massage, ointments, over the counter pain control  * activity modification  * return to clinic as needed.    Edson Reza M.D., M.S.  Dept. of Orthopaedic Surgery  [facility]            Again, thank you for allowing me to participate in the care of your patient.        Sincerely,        Edson Reza MD

## 2021-11-05 NOTE — PROGRESS NOTES
"Chief Complaint:   Chief Complaint   Patient presents with     Hip Pain     Bilateral hip pain. Onset: years, but has gotten worse recently. NKI. He has been doing a lot of outside work. Pain is mostly on the side. He has trouble getting to sleep because of pain. He is always cramping. Didn't really say he had any groin pain. No tx.        HISTORY OF PRESENT ILLNESS    Gucci Logan is a 71 year old male seen for evaluation of ongoing bilateral hip pain with no known injury.   Pain has been present for years, worse recently, relates to doing a lot of work outside. Worse after a long day on feet, at night. He's having trouble getting to sleep because of the pain. He has \"cramping\". Locates pain side of hips, buttock and low back.  no groin pain. Treatment has been nothing. Better with wearing work boots. Will do some home stretching of his back, seems to help.    Feels like right leg is shorter compared to the left , relates to right ankle injury.    Also has right foot drop, neuropathy.    Does have low back pain, reports history of surgery.    Present symptoms: pain laterally and posteriorly, pain dull/achy , mild pain.    Pain severity: 3/10  Frequency of symptoms: frequently  Exacerbating Factors: prolonged activities, laying on back  Relieving Factors: positional changes, stretching  Night Pain: Yes  Pain while at rest: No   Associated numbness or tingling: unrelated to hip       Other PMH:  has a past medical history of Arthritis, Esophageal reflux (3/1/2014), Hearing problem, Hiatal hernia, Hypertension, Jaundice (5/31/2008), Liver disease, Obesity (1/24/2013), Obstructive sleep apnea, and Sleep apnea. He also has no past medical history of Complication of anesthesia, Difficult intubation, Malignant hyperthermia, Motion sickness, PONV (postoperative nausea and vomiting), or Spinal headache.  Patient Active Problem List   Diagnosis     Advanced directives, counseling/discussion     CARDIOVASCULAR " SCREENING; LDL GOAL LESS THAN 130     Hypertension goal BP (blood pressure) < 140/90     Obesity, unspecified obesity severity, unspecified obesity type     Gastroesophageal reflux disease, esophagitis presence not specified     Hyperlipidemia LDL goal <100     Impaired fasting glucose     Obesity (BMI 35.0-39.9) with comorbidity (H)     Diabetes mellitus, type 2 (H)     Type 2 diabetes mellitus with diabetic autonomic neuropathy, without long-term current use of insulin (H)     Other cirrhosis of liver (H)     Abdominal pain     Portal vein thrombosis     Vitamin D deficiency     Thrombocytopenia (H)     Splenomegaly     Osteoarthrosis     SHONDA (obstructive sleep apnea)     Lymphadenopathy     Left ventricular hypertrophy     Jaundice     Chronic hepatitis C virus infection (H)       Surgical Hx:  has a past surgical history that includes SPINAL FUSION,ANT,EA ADNL LEVEL; OPEN RX ANKLE DISLOCATN+FIXATN; arthroscopy knee rt/lt; Endoscopic endonasal surgery (1994); rotator cuff repair rt/lt (Right); Hernia surgery (Left, 1994); endoscopy (2-19-15); nasal/sinus polypectomy; Eye surgery; cataract iol, rt/lt (Nov and Dec 2017); Repair ptosis (Left, 10/16/2018); Repair Ptosis Brow (Bilateral, 10/16/2018); Dacryocystorhinostomy (Left, 10/16/2018); Esophagoscopy, gastroscopy, duodenoscopy (EGD), combined (N/A, 4/24/2019); and Colonoscopy (N/A, 4/24/2019).    Medications:   Current Outpatient Medications:      aspirin (ASA) 81 MG chewable tablet, Take 81 mg by mouth daily, Disp: , Rfl:      atorvastatin (LIPITOR) 10 MG tablet, Take 1 tablet by mouth once daily, Disp: 90 tablet, Rfl: 0     hydrochlorothiazide (HYDRODIURIL) 50 MG tablet, Take 1 tablet by mouth once daily, Disp: 90 tablet, Rfl: 0     levothyroxine (SYNTHROID/LEVOTHROID) 25 MCG tablet, TAKE 1 TABLET (25 MCG) BY MOUTH ONCE DAILY, Disp: 90 tablet, Rfl: 1     losartan (COZAAR) 100 MG tablet, Take 1 tablet by mouth once daily, Disp: 90 tablet, Rfl: 0     metFORMIN  "(GLUCOPHAGE) 1000 MG tablet, Take one tablet three times weekly., Disp: 63 tablet, Rfl: 3     metoprolol succinate ER (TOPROL-XL) 25 MG 24 hr tablet, Take 3 tablets by mouth once daily, Disp: 270 tablet, Rfl: 0     multivitamin w/minerals (MULTI-VITAMIN) tablet, Take 1 tablet by mouth daily, Disp: , Rfl:      omeprazole (PRILOSEC) 20 MG DR capsule, TAKE 1 CAPSULE BY MOUTH ONCE DAILY 30 TO 60 MINUTES BEFORE A MEAL, Disp: 90 capsule, Rfl: 0     spironolactone (ALDACTONE) 25 MG tablet, Take 1 tablet (25 mg) by mouth daily, Disp: 90 tablet, Rfl: 1     cholecalciferol 25 MCG (1000 UT) TABS, Take 1,000 Units by mouth daily 2 tabs daily (Patient not taking: Reported on 9/1/2021), Disp: , Rfl:      HYDROcodone-acetaminophen (NORCO) 5-325 MG tablet, Take 1 tablet by mouth every 6 hours as needed for moderate to severe pain (Patient not taking: Reported on 11/3/2021), Disp: 10 tablet, Rfl: 0    Allergies:   Allergies   Allergen Reactions     Influenza Vaccines Other (See Comments)     delirium  fever         Social Hx: retired.  reports that he quit smoking about 22 years ago. His smoking use included cigarettes. He started smoking about 31 years ago. He smoked 0.00 packs per day for 5.00 years. He has never used smokeless tobacco. He reports current alcohol use. He reports that he does not use drugs.    Family Hx: family history includes Alzheimer Disease (age of onset: 85) in his mother; Cancer in his father; Cerebrovascular Disease (age of onset: 50) in his father; Dementia in his mother; Hypertension in his father.    REVIEW OF SYSTEMS:  10 point ROS neg other than the symptoms noted above in the HPI and PAST MEDICAL HISTORY. Notables,  CONSTITUTIONAL:NEGATIVE for fever, chills, change in weight  INTEGUMENTARY/SKIN: NEGATIVE for worrisome rashes, moles or lesions  MUSCULOSKELETAL:See HPI above  NEURO: NEGATIVE for weakness, dizziness or paresthesias    PHYSICAL EXAM:  BP (!) 145/78   Ht 1.803 m (5' 11\")   Wt 114.8 kg " (253 lb)   BMI 35.29 kg/m     GENERAL APPEARANCE: healthy, alert, no distress  SKIN: no suspicious lesions or rashes  NEURO: Normal strength and tone, mentation intact and speech normal  PSYCH:  mentation appears normal and affect normal  RESPIRATORY: No increased work of breathing.  LYMPH: no palpable inguinal lymph nodes.    BILATERAL LOWER EXTREMITIES:  Gait: favors the right, leans to the right, right foot drop.  varus alignment.  No gross deformities or masses.  Bilateral Quad atrophy, strength weak  Decreased sensation bilateral feet, right more than left.  Inability to dorsiflexion right foot, left foot otherwise Intact EHL, EDL, TA, FHL, GS, quadriceps hamstrings and hip flexors  Toes warm and well perfused, brisk capillary refill. Palpable 2+ dp pulses.  Feet of skin are dry. There is healing ulceration to the lateral side of the right small toe.  Bilateral calf soft and nttp or squeeze.  DTRs: achilles 2+, patella 2+.  Edema: 1+  Leg lengths: right appears short compared to the left at medial malleolus.      BACK EXAM  Tender to palpation middle and right > left low back and buttocks, SI joints.    LEFT HIP EXAM:      Palpation: Tender:   left greater trochanter, left gluteus medius, left iliac crest   Nontender: left groin  Strength:  4/5  Special tests:  Irritability (flexion/adduction/internal rotation) negative.  Hip range of motion: Internal rotation: 10, External rotation: 45, Flexion 95, all pain free      RIGHT HIP EXAM:    Palpation: Tender:   right greater trochanter, right gluteus medius, right iliac crest, right SI joint   Nontender: right groin  Strength:  4/5  Special tests:  Irritability (flexion/adduction/internal rotation) equivocal for lateral pain.  Hip range of motion: Internal rotation: 10, External rotation: 45, Flexion 95        X-RAY: AP pelvis and AP/Lateral views bilateral hip from 11/5/2021 were reviewed in clinic today. No obvious fractures or dislocations. Mild right > left  hip narrowing. No significant degenerative changes. Bilateral cam deformity.      Impression: 72yo male with chronic bilateral hip pain, most likely is low back pain.    Plan:   * exam, images reviewed.  * no significant hip osteoarthritis noted  * think more coming from the low back.  * recommend continued stretching and home exercise program, core exercises  * heat, massage, ointments, over the counter pain control  * activity modification  * return to clinic as needed.    Edson Reza M.D., M.S.  Dept. of Orthopaedic Surgery  [facility]

## 2021-11-12 ENCOUNTER — TELEPHONE (OUTPATIENT)
Dept: FAMILY MEDICINE | Facility: CLINIC | Age: 71
End: 2021-11-12

## 2021-11-12 ENCOUNTER — LAB (OUTPATIENT)
Dept: LAB | Facility: CLINIC | Age: 71
End: 2021-11-12
Payer: COMMERCIAL

## 2021-11-12 DIAGNOSIS — E11.43 TYPE 2 DIABETES MELLITUS WITH DIABETIC AUTONOMIC NEUROPATHY, WITHOUT LONG-TERM CURRENT USE OF INSULIN (H): ICD-10-CM

## 2021-11-12 DIAGNOSIS — E78.5 HYPERLIPIDEMIA LDL GOAL <100: ICD-10-CM

## 2021-11-12 DIAGNOSIS — R53.83 FATIGUE, UNSPECIFIED TYPE: ICD-10-CM

## 2021-11-12 DIAGNOSIS — E03.9 HYPOTHYROIDISM, UNSPECIFIED TYPE: ICD-10-CM

## 2021-11-12 DIAGNOSIS — Z12.5 SCREENING FOR PROSTATE CANCER: ICD-10-CM

## 2021-11-12 LAB
ALBUMIN SERPL-MCNC: 3.5 G/DL (ref 3.4–5)
ALP SERPL-CCNC: 98 U/L (ref 40–150)
ALT SERPL W P-5'-P-CCNC: 68 U/L (ref 0–70)
ANION GAP SERPL CALCULATED.3IONS-SCNC: 4 MMOL/L (ref 3–14)
AST SERPL W P-5'-P-CCNC: 46 U/L (ref 0–45)
BASOPHILS # BLD AUTO: 0 10E3/UL (ref 0–0.2)
BASOPHILS NFR BLD AUTO: 0 %
BILIRUB SERPL-MCNC: 1.1 MG/DL (ref 0.2–1.3)
BUN SERPL-MCNC: 19 MG/DL (ref 7–30)
CALCIUM SERPL-MCNC: 9 MG/DL (ref 8.5–10.1)
CHLORIDE BLD-SCNC: 109 MMOL/L (ref 94–109)
CHOLEST SERPL-MCNC: 131 MG/DL
CO2 SERPL-SCNC: 27 MMOL/L (ref 20–32)
CREAT SERPL-MCNC: 1.12 MG/DL (ref 0.66–1.25)
EOSINOPHIL # BLD AUTO: 0.1 10E3/UL (ref 0–0.7)
EOSINOPHIL NFR BLD AUTO: 2 %
ERYTHROCYTE [DISTWIDTH] IN BLOOD BY AUTOMATED COUNT: 13.2 % (ref 10–15)
FASTING STATUS PATIENT QL REPORTED: YES
GFR SERPL CREATININE-BSD FRML MDRD: 66 ML/MIN/1.73M2
GLUCOSE BLD-MCNC: 137 MG/DL (ref 70–99)
HBA1C MFR BLD: 6.2 % (ref 0–5.6)
HCT VFR BLD AUTO: 38.7 % (ref 40–53)
HDLC SERPL-MCNC: 60 MG/DL
HGB BLD-MCNC: 12.7 G/DL (ref 13.3–17.7)
IMM GRANULOCYTES # BLD: 0 10E3/UL
IMM GRANULOCYTES NFR BLD: 0 %
LDLC SERPL CALC-MCNC: 52 MG/DL
LYMPHOCYTES # BLD AUTO: 0.6 10E3/UL (ref 0.8–5.3)
LYMPHOCYTES NFR BLD AUTO: 12 %
MCH RBC QN AUTO: 32.2 PG (ref 26.5–33)
MCHC RBC AUTO-ENTMCNC: 32.8 G/DL (ref 31.5–36.5)
MCV RBC AUTO: 98 FL (ref 78–100)
MONOCYTES # BLD AUTO: 0.4 10E3/UL (ref 0–1.3)
MONOCYTES NFR BLD AUTO: 8 %
NEUTROPHILS # BLD AUTO: 4.1 10E3/UL (ref 1.6–8.3)
NEUTROPHILS NFR BLD AUTO: 78 %
NONHDLC SERPL-MCNC: 71 MG/DL
NRBC # BLD AUTO: 0 10E3/UL
NRBC BLD AUTO-RTO: 0 /100
PLATELET # BLD AUTO: 60 10E3/UL (ref 150–450)
POTASSIUM BLD-SCNC: 4.1 MMOL/L (ref 3.4–5.3)
PROT SERPL-MCNC: 7.1 G/DL (ref 6.8–8.8)
PSA SERPL-MCNC: 1.7 UG/L (ref 0–4)
RBC # BLD AUTO: 3.95 10E6/UL (ref 4.4–5.9)
SODIUM SERPL-SCNC: 140 MMOL/L (ref 133–144)
T4 FREE SERPL-MCNC: 1.01 NG/DL (ref 0.76–1.46)
TRIGL SERPL-MCNC: 95 MG/DL
TSH SERPL DL<=0.005 MIU/L-ACNC: 2.58 MU/L (ref 0.4–4)
WBC # BLD AUTO: 5.3 10E3/UL (ref 4–11)

## 2021-11-12 PROCEDURE — 36415 COLL VENOUS BLD VENIPUNCTURE: CPT

## 2021-11-12 PROCEDURE — 85025 COMPLETE CBC W/AUTO DIFF WBC: CPT

## 2021-11-12 PROCEDURE — 83036 HEMOGLOBIN GLYCOSYLATED A1C: CPT

## 2021-11-12 PROCEDURE — 84439 ASSAY OF FREE THYROXINE: CPT

## 2021-11-12 PROCEDURE — 80061 LIPID PANEL: CPT

## 2021-11-12 PROCEDURE — 80053 COMPREHEN METABOLIC PANEL: CPT

## 2021-11-12 PROCEDURE — G0103 PSA SCREENING: HCPCS

## 2021-11-12 PROCEDURE — 84443 ASSAY THYROID STIM HORMONE: CPT

## 2021-11-12 NOTE — LETTER
December 14, 2021    Gucci Logan  [address] [place] [place] [facility] [address]          Dear ,    We are writing to inform you of your test results.    I had been holding this for your clinic appointment in November.  Perhaps you had to reschedule.     Anyway most of the labs are okay but hemoglobin ( red blood count ) is low.  See me in clinic in the next month or so to discuss details.       Resulted Orders   TSH   Result Value Ref Range    TSH 2.58 0.40 - 4.00 mU/L   T4 free   Result Value Ref Range    Free T4 1.01 0.76 - 1.46 ng/dL   Hemoglobin A1c   Result Value Ref Range    Hemoglobin A1C 6.2 (H) 0.0 - 5.6 %      Comment:      Normal <5.7%   Prediabetes 5.7-6.4%    Diabetes 6.5% or higher     Note: Adopted from ADA consensus guidelines.   Comprehensive metabolic panel   Result Value Ref Range    Sodium 140 133 - 144 mmol/L    Potassium 4.1 3.4 - 5.3 mmol/L    Chloride 109 94 - 109 mmol/L    Carbon Dioxide (CO2) 27 20 - 32 mmol/L    Anion Gap 4 3 - 14 mmol/L    Urea Nitrogen 19 7 - 30 mg/dL    Creatinine 1.12 0.66 - 1.25 mg/dL    Calcium 9.0 8.5 - 10.1 mg/dL    Glucose 137 (H) 70 - 99 mg/dL    Alkaline Phosphatase 98 40 - 150 U/L    AST 46 (H) 0 - 45 U/L    ALT 68 0 - 70 U/L    Protein Total 7.1 6.8 - 8.8 g/dL    Albumin 3.5 3.4 - 5.0 g/dL    Bilirubin Total 1.1 0.2 - 1.3 mg/dL    GFR Estimate 66 >60 mL/min/1.73m2      Comment:      As of July 11, 2021, eGFR is calculated by the CKD-EPI creatinine equation, without race adjustment. eGFR can be influenced by muscle mass, exercise, and diet. The reported eGFR is an estimation only and is only applicable if the renal function is stable.   Lipid panel reflex to direct LDL Fasting   Result Value Ref Range    Cholesterol 131 <200 mg/dL    Triglycerides 95 <150 mg/dL    Direct Measure HDL 60 >=40 mg/dL    LDL Cholesterol Calculated 52 <=100 mg/dL    Non HDL Cholesterol 71 <130 mg/dL    Patient Fasting > 8hrs? Yes     Narrative    Cholesterol  Desirable:   <200 mg/dL    Triglycerides  Normal:  Less than 150 mg/dL  Borderline High:  150-199 mg/dL  High:  200-499 mg/dL  Very High:  Greater than or equal to 500 mg/dL    Direct Measure HDL  Female:  Greater than or equal to 50 mg/dL   Male:  Greater than or equal to 40 mg/dL    LDL Cholesterol  Desirable:  <100mg/dL  Above Desirable:  100-129 mg/dL   Borderline High:  130-159 mg/dL   High:  160-189 mg/dL   Very High:  >= 190 mg/dL    Non HDL Cholesterol  Desirable:  130 mg/dL  Above Desirable:  130-159 mg/dL  Borderline High:  160-189 mg/dL  High:  190-219 mg/dL  Very High:  Greater than or equal to 220 mg/dL   Prostate Specific Antigen Screen   Result Value Ref Range    Prostate Specific Antigen Screen 1.70 0.00 - 4.00 ug/L   CBC with platelets and differential   Result Value Ref Range    WBC Count 5.3 4.0 - 11.0 10e3/uL    RBC Count 3.95 (L) 4.40 - 5.90 10e6/uL    Hemoglobin 12.7 (L) 13.3 - 17.7 g/dL    Hematocrit 38.7 (L) 40.0 - 53.0 %    MCV 98 78 - 100 fL    MCH 32.2 26.5 - 33.0 pg    MCHC 32.8 31.5 - 36.5 g/dL    RDW 13.2 10.0 - 15.0 %    Platelet Count 60 (L) 150 - 450 10e3/uL    % Neutrophils 78 %    % Lymphocytes 12 %    % Monocytes 8 %    % Eosinophils 2 %    % Basophils 0 %    % Immature Granulocytes 0 %    NRBCs per 100 WBC 0 <1 /100    Absolute Neutrophils 4.1 1.6 - 8.3 10e3/uL    Absolute Lymphocytes 0.6 (L) 0.8 - 5.3 10e3/uL    Absolute Monocytes 0.4 0.0 - 1.3 10e3/uL    Absolute Eosinophils 0.1 0.0 - 0.7 10e3/uL    Absolute Basophils 0.0 0.0 - 0.2 10e3/uL    Absolute Immature Granulocytes 0.0 <=0.0 10e3/uL    Absolute NRBCs 0.0 10e3/uL       If you have any questions or concerns, please call the clinic at the number listed above.       Sincerely,      Richi Jaramillo MD

## 2021-11-12 NOTE — TELEPHONE ENCOUNTER
Reason for Call:  Other call back    Detailed comments: patient would like to have a phone visit with Dr Maradiaga so if his nurse or Dr Maradiaga could give him a call to let him know or put a message in his chart. He will be back on Monday the 15th to check to see what Dr Kuo says    Phone Number Patient can be reached at: Home number on file [phone] (home)    Best Time: anytime before 12:00    Can we leave a detailed message on this number? Not Applicable    Call taken on 11/12/2021 at 10:32 AM by Tahmina Frederick

## 2021-11-15 ENCOUNTER — TELEPHONE (OUTPATIENT)
Dept: FAMILY MEDICINE | Facility: CLINIC | Age: 71
End: 2021-11-15
Payer: COMMERCIAL

## 2021-11-15 NOTE — TELEPHONE ENCOUNTER
Unable to leave message. Reached out to pt to reschedule. If pt calls back, please assist with rescheduling.

## 2021-11-15 NOTE — TELEPHONE ENCOUNTER
Reason for Call:  Other call back    Detailed comments: patient was wondering if he could have his phone encounter on the  24th at 11:00 am instead, he has a dental appt at 1130 for dental implant on that same day. So if Dr Maradiaga or his nurse please give him a call ASAP to either change time or appt all together.    Phone Number Patient can be reached at: Cell number on file:    Telephone Information:   Mobile [phone]       Best Time: anytime ASAP    Can we leave a detailed message on this number? Not Applicable    Call taken on 11/15/2021 at 10:39 AM by Tahmina Frederick

## 2021-11-16 NOTE — TELEPHONE ENCOUNTER
Second attempt, unable to leave message. We are not able to move the appointment to 11.  If pt calls back, please assist pt with rescheduling

## 2021-11-29 ENCOUNTER — NURSE TRIAGE (OUTPATIENT)
Dept: FAMILY MEDICINE | Facility: CLINIC | Age: 71
End: 2021-11-29
Payer: COMMERCIAL

## 2021-11-29 NOTE — TELEPHONE ENCOUNTER
"Called and spoke with patient.  He reports that on 11/21/21 he was being lazy and avoided to use a ladder.  He reached out to change a light bulb in his basement, stumble and fell hitting the right side of the back of his head on concrete wall and landed on his left side.  Patient reports that he may have lost consciousness and is not sure for how long.    Patient reports that he was not able to move his neck and head for a few days.  Patient reports pain to his left shoulder, arm and wrist.   He reports pain to his neck and jaw tightness that is interfering with his ability to eat certain foods.  Patient developed a goose egg size swelling to right back side of head.  Patient reports increased fatigue and feeling lethargic, constant headaches that vary in intensity.  He is taking Tylenol as needed with some relief.  Patient reports left eye is \"different\", blurred vision.  Patient \"self medicated with beers and whiskey\".    Patient denies any other symptoms or concerns at this time.    Patient is requesting orders for X-rays to further evaluated on-going symptoms.  Patient was advised to seek medical assistance at the nearest ER.  Patient states that he is in the [place] area and is stable to drive to [facility] at this time      Reason for Disposition    ACUTE NEUROLOGIC SYMPTOM and now fine    Knocked out (unconscious) < 1 minute and now fine    Additional Information    Negative: ACUTE NEUROLOGIC SYMPTOM and symptom present now    Negative: Knocked out (unconscious) > 1 minute    Negative: Seizure (convulsion) occurred (Exception: prior history of seizures and now alert and without Acute Neurologic Symptoms)    Negative: Neck pain after dangerous injury (e.g., MVA, diving, trampoline, contact sports, fall > 10 feet or 3 meters) (Exception: neck pain began > 1 hour after injury)    Negative: Major bleeding (actively dripping or spurting) that can't be stopped    Negative: Penetrating head injury (e.g., knife, " "gunshot wound, metal object)    Negative: Sounds like a life-threatening emergency to the triager    Negative: Recently examined and diagnosed with a concussion by a healthcare provider and has questions about concussion symptoms    Negative: Can't remember what happened (amnesia)    Negative: Vomiting once or more    Negative: Watery or blood-tinged fluid dripping from the nose or ears    Answer Assessment - Initial Assessment Questions  1. MECHANISM: \"How did the injury happen?\" For falls, ask: \"What height did you fall from?\" and \"What surface did you fall against?\"       Patient was reaching out to change a light bulb without using a ladder and stumbled and fell      2. ONSET: \"When did the injury happen?\" (Minutes or hours ago)       Hit his right side of head on concrete wall      3. NEUROLOGIC SYMPTOMS: \"Was there any loss of consciousness?\" \"Are there any other neurological symptoms?\"       May have lost consciousness, not sure how long    4. MENTAL STATUS: \"Does the person know who he is, who you are, and where he is?\"       Yes    5. LOCATION: \"What part of the head was hit?\"       Right back of head    6. SCALP APPEARANCE: \"What does the scalp look like? Is it bleeding now?\" If so, ask: \"Is it difficult to stop?\"       No    7. SIZE: For cuts, bruises, or swelling, ask: \"How large is it?\" (e.g., inches or centimeters)      Developed goose egg size swelling to right side back of head      8. PAIN: \"Is there any pain?\" If so, ask: \"How bad is it?\"  (e.g., Scale 1-10; or mild, moderate, severe)     Persistent headache that varies in intensity    9. TETANUS: For any breaks in the skin, ask: \"When was the last tetanus booster?\"        10. OTHER SYMPTOMS: \"Do you have any other symptoms?\" (e.g., neck pain, vomiting)        Neck pain, jaw pain, nausea with no emesis, left shoulder, arm and wrist pain    Protocols used: HEAD INJURY-A-OH    "

## 2021-11-29 NOTE — TELEPHONE ENCOUNTER
Reason for Call: Request for an order or referral:    Order or referral being requested: XRay    Date needed: as soon as possible    Has the patient been seen by the PCP for this problem? NO    Additional comments: Fell hit head may have been knocked out on 11-21-21 Still having pain on left side of head by ear and neck    Phone number Patient can be reached at:  Cell number on file:    Telephone Information:   Mobile [phone]       Best Time:  any    Can we leave a detailed message on this number?  Not Applicable    Call taken on 11/29/2021 at 9:20 AM by Tomasa Balderas

## 2021-12-06 ENCOUNTER — TRANSFERRED RECORDS (OUTPATIENT)
Dept: HEALTH INFORMATION MANAGEMENT | Facility: CLINIC | Age: 71
End: 2021-12-06
Payer: COMMERCIAL

## 2021-12-11 NOTE — RESULT ENCOUNTER NOTE
I had been holding this for your clinic appointment in November.  Perhaps you had to reschedule.    Anyway most of the labs are okay but hemoglobin ( red blood count ) is low.  See me in clinic in the next month or so to discuss details.    Richi Jaramillo MD

## 2021-12-20 ENCOUNTER — TELEPHONE (OUTPATIENT)
Dept: FAMILY MEDICINE | Facility: CLINIC | Age: 71
End: 2021-12-20
Payer: COMMERCIAL

## 2021-12-20 NOTE — TELEPHONE ENCOUNTER
Reason for Call:  Other call back    Detailed comments: fell on 11-14-21 received a concussion still having vertigo and balance and was checking to see if you had seen the CT scan done by [facility] and what your thoughts are and to email instead of calling do to phone [email or web address]    Phone Number Patient can be reached at: Home number on file [phone] (home)    Best Time: any    Can we leave a detailed message on this number? Not Applicable    Call taken on 12/20/2021 at 1:27 PM by Tomasa Balderas

## 2021-12-23 NOTE — TELEPHONE ENCOUNTER
I sent message to patient  Advised him to get report and bring it in to clinic  I could not find radiology report in [facility]  Richi Jaramillo MD

## 2022-01-06 ENCOUNTER — OFFICE VISIT (OUTPATIENT)
Dept: GASTROENTEROLOGY | Facility: CLINIC | Age: 72
End: 2022-01-06
Attending: INTERNAL MEDICINE
Payer: COMMERCIAL

## 2022-01-06 VITALS — OXYGEN SATURATION: 94 % | HEART RATE: 60 BPM | DIASTOLIC BLOOD PRESSURE: 82 MMHG | SYSTOLIC BLOOD PRESSURE: 190 MMHG

## 2022-01-06 DIAGNOSIS — K74.60 CIRRHOSIS OF LIVER WITHOUT ASCITES, UNSPECIFIED HEPATIC CIRRHOSIS TYPE (H): Primary | ICD-10-CM

## 2022-01-06 PROCEDURE — 99214 OFFICE O/P EST MOD 30 MIN: CPT | Mod: 25 | Performed by: INTERNAL MEDICINE

## 2022-01-06 NOTE — LETTER
1/6/2022     RE: Gucci Logan  [address] [place] [address]    Dear Colleague,    Thank you for referring your patient, Gucci Logan, to the [facility] [place]. Please see a copy of my visit note below.    HISTORY OF PRESENT ILLNESS:  I had the pleasure of seeing Canelo Logan for followup in the Liver Clinic at the [facility] on 01/06/2022.  Mr. 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    For the most part, he is doing well.  He denies any abdominal pain.  He does complain of a small umbilical hernia in the abdominal wall diastasis.  However, it is not tender at all.  He denies any itching or skin rash.  He denies fatigue.  He denies any increased abdominal girth or lower extremity edema.  He has not had any gastrointestinal bleeding or any overt signs of hepatic encephalopathy.  [place] and sustained an apparent concussion and had postconcussive symptoms that just now are starting to resolve.    He denies any fevers or chills.  He does have some sweating at night.  He denies any nausea or vomiting, and he does have occasional diarrhea, likely related to the metformin.  He denies any constipation.  He states his appetite has been good and his weight for the most part has been stable.    Current Outpatient Medications   Medication     aspirin (ASA) 81 MG chewable tablet     atorvastatin (LIPITOR) 10 MG tablet     hydrochlorothiazide (HYDRODIURIL) 50 MG tablet     levothyroxine (SYNTHROID/LEVOTHROID) 25 MCG tablet     losartan (COZAAR) 100 MG tablet     metFORMIN (GLUCOPHAGE) 1000 MG tablet     metoprolol succinate ER (TOPROL-XL) 25 MG 24 hr tablet     multivitamin w/minerals (MULTI-VITAMIN) tablet     omeprazole (PRILOSEC) 20 MG DR capsule     spironolactone (ALDACTONE) 25 MG tablet     cholecalciferol 25 MCG (1000 UT) TABS      HYDROcodone-acetaminophen (NORCO) 5-325 MG tablet     No current facility-administered medications for this visit.     BP (!) 190/82   Pulse 60   SpO2 94%     PHYSICAL EXAMINATION:    GENERAL:  He looks quite well.  HEENT:  Shows no scleral icterus or temporal muscle wasting.  CHEST:  Clear.  ABDOMEN:  No increase in girth.  No masses or tenderness to palpation are present.  His liver is 10 cm in span without left lobe enlargement.  No spleen tip is palpable.  EXTREMITIES:  No edema.  SKIN:  No stigmata of chronic liver disease.  NEUROLOGIC:  No asterixis.    LABORATORY:  His most recent laboratory tests are from 11/12/2021 and showed his white count was 5.3.  His hemoglobin was 12.7 with an MCV of 98, and platelets were 60,000.  His sodium was 140, potassium 4.1, BUN is 19, creatinine 1.12.  His AST is 46, ALT 68, alkaline phosphatase 98, albumin is 3.5, total protein of 7.1 and total bilirubin is 1.1.  His hemoglobin A1c was 6.2.  His LDL cholesterol was 52.      His last imaging was a CT scan back in October that showed a cirrhotic-appearing liver.  No ascites was present.  There are no significant lesions in the liver.    IMPRESSION:  Mr. 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  His disease is extremely well compensated at this point in time.  He is up to date with regard to vaccines and cancer screening.  His last upper GI endoscopy was in 04/2019 and showed trivial varices.  When he comes back in 6 months, I will reschedule him for an endoscopy at that point in time.    He has received all 3 doses of against COVID-19 vaccine.  I did  him that I did not think he should have his hernia repair as it is really very small and without symptoms at this point in time and I did point out that he did not need his diastasis repaired as well as it is purely cosmetic.    Thank you very much for allowing me to participate in the care of this patient.  If you have any questions regarding  my recommendations, please do not hesitate to contact me.         Mata Renteria MD      Professor of Medicine  [employer]      Executive Medical Director, Solid Organ Transplant Program  [facility]

## 2022-01-06 NOTE — PROGRESS NOTES
HISTORY OF PRESENT ILLNESS:  I had the pleasure of seeing Canelo Logan for followup in the Liver Clinic at the [facility] on 01/06/2022.  Mr. 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    For the most part, he is doing well.  He denies any abdominal pain.  He does complain of a small umbilical hernia in the abdominal wall diastasis.  However, it is not tender at all.  He denies any itching or skin rash.  He denies fatigue.  He denies any increased abdominal girth or lower extremity edema.  He has not had any gastrointestinal bleeding or any overt signs of hepatic encephalopathy.  [place] and sustained an apparent concussion and had postconcussive symptoms that just now are starting to resolve.    He denies any fevers or chills.  He does have some sweating at night.  He denies any nausea or vomiting, and he does have occasional diarrhea, likely related to the metformin.  He denies any constipation.  He states his appetite has been good and his weight for the most part has been stable.    Current Outpatient Medications   Medication     aspirin (ASA) 81 MG chewable tablet     atorvastatin (LIPITOR) 10 MG tablet     hydrochlorothiazide (HYDRODIURIL) 50 MG tablet     levothyroxine (SYNTHROID/LEVOTHROID) 25 MCG tablet     losartan (COZAAR) 100 MG tablet     metFORMIN (GLUCOPHAGE) 1000 MG tablet     metoprolol succinate ER (TOPROL-XL) 25 MG 24 hr tablet     multivitamin w/minerals (MULTI-VITAMIN) tablet     omeprazole (PRILOSEC) 20 MG DR capsule     spironolactone (ALDACTONE) 25 MG tablet     cholecalciferol 25 MCG (1000 UT) TABS     HYDROcodone-acetaminophen (NORCO) 5-325 MG tablet     No current facility-administered medications for this visit.     BP (!) 190/82   Pulse 60   SpO2 94%     PHYSICAL EXAMINATION:    GENERAL:  He looks quite well.  HEENT:  Shows no scleral icterus or temporal  muscle wasting.  CHEST:  Clear.  ABDOMEN:  No increase in girth.  No masses or tenderness to palpation are present.  His liver is 10 cm in span without left lobe enlargement.  No spleen tip is palpable.  EXTREMITIES:  No edema.  SKIN:  No stigmata of chronic liver disease.  NEUROLOGIC:  No asterixis.    LABORATORY:  His most recent laboratory tests are from 11/12/2021 and showed his white count was 5.3.  His hemoglobin was 12.7 with an MCV of 98, and platelets were 60,000.  His sodium was 140, potassium 4.1, BUN is 19, creatinine 1.12.  His AST is 46, ALT 68, alkaline phosphatase 98, albumin is 3.5, total protein of 7.1 and total bilirubin is 1.1.  His hemoglobin A1c was 6.2.  His LDL cholesterol was 52.      His last imaging was a CT scan back in October that showed a cirrhotic-appearing liver.  No ascites was present.  There are no significant lesions in the liver.    IMPRESSION:  Mr. 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  His disease is extremely well compensated at this point in time.  He is up to date with regard to vaccines and cancer screening.  His last upper GI endoscopy was in 04/2019 and showed trivial varices.  When he comes back in 6 months, I will reschedule him for an endoscopy at that point in time.    He has received all 3 doses of against COVID-19 vaccine.  I did  him that I did not think he should have his hernia repair as it is really very small and without symptoms at this point in time and I did point out that he did not need his diastasis repaired as well as it is purely cosmetic.    Thank you very much for allowing me to participate in the care of this patient.  If you have any questions regarding my recommendations, please do not hesitate to contact me.         Mata Renteria MD      Professor of Medicine  [facility] [employer] [facility]      Executive Medical Director, Solid Organ Transplant Program  [facility]

## 2022-01-17 NOTE — PROGRESS NOTES
Chief Complaint - Dizziness    History of Present Illness - Gucci Logan is a 71 year old male who presents with dizziness. The patient describes vertigo in which the entire room spins, or they spin, or there is a sense of motion.  It lasts for seconds to minutes.  This has been going on for 1/14/22. He had a head trauma. Since then he has had vertigo with head movements. This is worsened by getting up and moving head, rolling over in bed.      Past Medical History -   Patient Active Problem List   Diagnosis     Advanced directives, counseling/discussion     CARDIOVASCULAR SCREENING; LDL GOAL LESS THAN 130     Hypertension goal BP (blood pressure) < 140/90     Obesity, unspecified obesity severity, unspecified obesity type     Gastroesophageal reflux disease, esophagitis presence not specified     Hyperlipidemia LDL goal <100     Impaired fasting glucose     Obesity (BMI 35.0-39.9) with comorbidity (H)     Diabetes mellitus, type 2 (H)     Type 2 diabetes mellitus with diabetic autonomic neuropathy, without long-term current use of insulin (H)     Other cirrhosis of liver (H)     Abdominal pain     Portal vein thrombosis     Vitamin D deficiency     Thrombocytopenia (H)     Splenomegaly     Osteoarthrosis     SHONDA (obstructive sleep apnea)     Lymphadenopathy     Left ventricular hypertrophy     Jaundice     Chronic hepatitis C virus infection (H)       Current Medications -   Current Outpatient Medications:      aspirin (ASA) 81 MG chewable tablet, Take 81 mg by mouth daily, Disp: , Rfl:      atorvastatin (LIPITOR) 10 MG tablet, Take 1 tablet by mouth once daily, Disp: 90 tablet, Rfl: 0     cholecalciferol 25 MCG (1000 UT) TABS, Take 1,000 Units by mouth daily 2 tabs daily, Disp: , Rfl:      hydrochlorothiazide (HYDRODIURIL) 50 MG tablet, Take 1 tablet by mouth once daily, Disp: 90 tablet, Rfl: 0     HYDROcodone-acetaminophen (NORCO) 5-325 MG tablet, Take 1 tablet by mouth every 6 hours as needed for moderate  to severe pain, Disp: 10 tablet, Rfl: 0     levothyroxine (SYNTHROID/LEVOTHROID) 25 MCG tablet, Take 1 tablet by mouth once daily, Disp: 90 tablet, Rfl: 1     losartan (COZAAR) 100 MG tablet, Take 1 tablet by mouth once daily, Disp: 90 tablet, Rfl: 0     metFORMIN (GLUCOPHAGE) 1000 MG tablet, Take one tablet three times weekly., Disp: 63 tablet, Rfl: 3     metoprolol succinate ER (TOPROL-XL) 25 MG 24 hr tablet, Take 3 tablets by mouth once daily, Disp: 270 tablet, Rfl: 0     multivitamin w/minerals (MULTI-VITAMIN) tablet, Take 1 tablet by mouth daily, Disp: , Rfl:      omeprazole (PRILOSEC) 20 MG DR capsule, TAKE 1 CAPSULE BY MOUTH ONCE DAILY 30 TO 60 MINUTES BEFORE A MEAL, Disp: 90 capsule, Rfl: 2     spironolactone (ALDACTONE) 25 MG tablet, Take 1 tablet (25 mg) by mouth daily, Disp: 90 tablet, Rfl: 1    Allergies -   Allergies   Allergen Reactions     Influenza Vaccines Other (See Comments)     delirium  fever         Social History -   Social History     Socioeconomic History     Marital status: Single     Spouse name: Not on file     Number of children: 0     Years of education: 18     Highest education level: Not on file   Occupational History     Occupation: Contractor     Employer: [employer]     Comment: Not working now   Tobacco Use     Smoking status: Former Smoker     Packs/day: 0.00     Years: 5.00     Pack years: 0.00     Types: Cigarettes     Start date: 1990     Quit date: 1999     Years since quittin.0     Smokeless tobacco: Never Used   Substance and Sexual Activity     Alcohol use: Yes     Comment: rare     Drug use: No     Sexual activity: Not Currently     Partners: Female   Other Topics Concern     Parent/sibling w/ CABG, MI or angioplasty before 65F 55M? No   Social History Narrative     Not on file     Social Determinants of Health     Financial Resource Strain: Not on file   Food Insecurity: Not on file   Transportation Needs: Not on file   Physical Activity: Not on file   Stress: Not  on file   Social Connections: Not on file   Intimate Partner Violence: Not on file   Housing Stability: Not on file       Family History -   Family History   Problem Relation Age of Onset     Alzheimer Disease Mother 85     Dementia Mother      Cerebrovascular Disease Father 50     Cancer Father      Hypertension Father      Neurologic Disorder No family hx of      Diabetes No family hx of        Review of Systems - As per HPI and PMHx, otherwise 10+ comprehensive system review is negative.    Physical Exam  BP (!) 171/78   Pulse 72   Wt 120.1 kg (264 lb 12.8 oz)   SpO2 96%   BMI 36.93 kg/m    General - The patient is in no distress.  Alert and oriented x3, answers questions and cooperates with examination appropriately.   Voice and Breathing - The patient was breathing comfortably without the use of accessory muscles. There was no wheezing, stridor, or stertor.  The patients voice was clear and strong, with no dysphonia.    Eyes - Pupils are reactive to light. Extraocular movements intact. Sclera were not icteric or injected, conjunctiva were pink and moist. No nystagmus.  Ears - The auricles appeared normal. The external auditory canals were nonedematous and nonerythematous. The tympanic membranes are normal in appearance, bony landmarks are intact.  No retraction, perforation, or masses.  No fluid or purulence was seen in the external canal or the middle ear.   Neurologic - Cranial nerves II-XII are grossly intact. Specifically, the facial nerve is intact, House-Brackmann grade 1 of 6.   Neck -  Soft, nontender. Palpation of the occipital, submental, submandibular, internal jugular chain, and supraclavicular nodes did not demonstrate any abnormal lymph nodes or masses. The parotid glands were without masses. Palpation of the thyroid was soft and smooth, with no nodules or goiter appreciated.  The trachea was midline.      Audiologic Studies - An audiogram and tympanogram was performed 9/15/21 and reviewed.  The tympanogram shows a normal Type A curve, with normal canal volume and middle ear pressure. There is no sign of eustachian tube dysfunction or middle ear effusion. The audiogram showed a significant down-sloping mid to high frequency sensorineural hearing loss.  There was no evidence of conductive hearing loss or significant asymmetry.  When compared to his audiogram from 2015 this has worsened in the highest tones.    A/P -     ICD-10-CM    1. Benign paroxysmal positional vertigo, right  H81.11 Physical Therapy Referral       Gucci Logan is a 71 year old male with dizziness. This seems most likely right BPPV. I have recommended the Epley maneuver and PT if he cannot make improvements while at home.         Dudley Adams MD  Otolaryngology  [facility]

## 2022-01-19 ENCOUNTER — OFFICE VISIT (OUTPATIENT)
Dept: OTOLARYNGOLOGY | Facility: CLINIC | Age: 72
End: 2022-01-19
Payer: COMMERCIAL

## 2022-01-19 VITALS
DIASTOLIC BLOOD PRESSURE: 78 MMHG | BODY MASS INDEX: 36.93 KG/M2 | HEART RATE: 72 BPM | OXYGEN SATURATION: 96 % | SYSTOLIC BLOOD PRESSURE: 171 MMHG | WEIGHT: 264.8 LBS

## 2022-01-19 DIAGNOSIS — H81.11 BENIGN PAROXYSMAL POSITIONAL VERTIGO, RIGHT: Primary | ICD-10-CM

## 2022-01-19 PROCEDURE — 99213 OFFICE O/P EST LOW 20 MIN: CPT | Performed by: OTOLARYNGOLOGY

## 2022-01-19 NOTE — LETTER
1/19/2022         RE: Gucci Logan  [address] [place] [address]        Dear Colleague,    Thank you for referring your patient, Gucci Logan, to the [facility]. Please see a copy of my visit note below.    Chief Complaint - Dizziness    History of Present Illness - Gucci Logan is a 71 year old male who presents with dizziness. The patient describes vertigo in which the entire room spins, or they spin, or there is a sense of motion.  It lasts for seconds to minutes.  This has been going on for 1/14/22. He had a head trauma. Since then he has had vertigo with head movements. This is worsened by getting up and moving head, rolling over in bed.      Past Medical History -   Patient Active Problem List   Diagnosis     Advanced directives, counseling/discussion     CARDIOVASCULAR SCREENING; LDL GOAL LESS THAN 130     Hypertension goal BP (blood pressure) < 140/90     Obesity, unspecified obesity severity, unspecified obesity type     Gastroesophageal reflux disease, esophagitis presence not specified     Hyperlipidemia LDL goal <100     Impaired fasting glucose     Obesity (BMI 35.0-39.9) with comorbidity (H)     Diabetes mellitus, type 2 (H)     Type 2 diabetes mellitus with diabetic autonomic neuropathy, without long-term current use of insulin (H)     Other cirrhosis of liver (H)     Abdominal pain     Portal vein thrombosis     Vitamin D deficiency     Thrombocytopenia (H)     Splenomegaly     Osteoarthrosis     SHONDA (obstructive sleep apnea)     Lymphadenopathy     Left ventricular hypertrophy     Jaundice     Chronic hepatitis C virus infection (H)       Current Medications -   Current Outpatient Medications:      aspirin (ASA) 81 MG chewable tablet, Take 81 mg by mouth daily, Disp: , Rfl:      atorvastatin (LIPITOR) 10 MG tablet, Take 1 tablet by mouth once daily, Disp: 90 tablet, Rfl: 0     cholecalciferol 25 MCG (1000 UT) TABS, Take 1,000 Units by mouth  daily 2 tabs daily, Disp: , Rfl:      hydrochlorothiazide (HYDRODIURIL) 50 MG tablet, Take 1 tablet by mouth once daily, Disp: 90 tablet, Rfl: 0     HYDROcodone-acetaminophen (NORCO) 5-325 MG tablet, Take 1 tablet by mouth every 6 hours as needed for moderate to severe pain, Disp: 10 tablet, Rfl: 0     levothyroxine (SYNTHROID/LEVOTHROID) 25 MCG tablet, Take 1 tablet by mouth once daily, Disp: 90 tablet, Rfl: 1     losartan (COZAAR) 100 MG tablet, Take 1 tablet by mouth once daily, Disp: 90 tablet, Rfl: 0     metFORMIN (GLUCOPHAGE) 1000 MG tablet, Take one tablet three times weekly., Disp: 63 tablet, Rfl: 3     metoprolol succinate ER (TOPROL-XL) 25 MG 24 hr tablet, Take 3 tablets by mouth once daily, Disp: 270 tablet, Rfl: 0     multivitamin w/minerals (MULTI-VITAMIN) tablet, Take 1 tablet by mouth daily, Disp: , Rfl:      omeprazole (PRILOSEC) 20 MG DR capsule, TAKE 1 CAPSULE BY MOUTH ONCE DAILY 30 TO 60 MINUTES BEFORE A MEAL, Disp: 90 capsule, Rfl: 2     spironolactone (ALDACTONE) 25 MG tablet, Take 1 tablet (25 mg) by mouth daily, Disp: 90 tablet, Rfl: 1    Allergies -   Allergies   Allergen Reactions     Influenza Vaccines Other (See Comments)     delirium  fever         Social History -   Social History     Socioeconomic History     Marital status: Single     Spouse name: Not on file     Number of children: 0     Years of education: 18     Highest education level: Not on file   Occupational History     Occupation: Contractor     Employer: [employer]     Comment: Not working now   Tobacco Use     Smoking status: Former Smoker     Packs/day: 0.00     Years: 5.00     Pack years: 0.00     Types: Cigarettes     Start date: 1990     Quit date: 1999     Years since quittin.0     Smokeless tobacco: Never Used   Substance and Sexual Activity     Alcohol use: Yes     Comment: rare     Drug use: No     Sexual activity: Not Currently     Partners: Female   Other Topics Concern     Parent/sibling w/ CABG, MI or  angioplasty before 65F 55M? No   Social History Narrative     Not on file     Social Determinants of Health     Financial Resource Strain: Not on file   Food Insecurity: Not on file   Transportation Needs: Not on file   Physical Activity: Not on file   Stress: Not on file   Social Connections: Not on file   Intimate Partner Violence: Not on file   Housing Stability: Not on file       Family History -   Family History   Problem Relation Age of Onset     Alzheimer Disease Mother 85     Dementia Mother      Cerebrovascular Disease Father 50     Cancer Father      Hypertension Father      Neurologic Disorder No family hx of      Diabetes No family hx of        Review of Systems - As per HPI and PMHx, otherwise 10+ comprehensive system review is negative.    Physical Exam  BP (!) 171/78   Pulse 72   Wt 120.1 kg (264 lb 12.8 oz)   SpO2 96%   BMI 36.93 kg/m    General - The patient is in no distress.  Alert and oriented x3, answers questions and cooperates with examination appropriately.   Voice and Breathing - The patient was breathing comfortably without the use of accessory muscles. There was no wheezing, stridor, or stertor.  The patients voice was clear and strong, with no dysphonia.    Eyes - Pupils are reactive to light. Extraocular movements intact. Sclera were not icteric or injected, conjunctiva were pink and moist. No nystagmus.  Ears - The auricles appeared normal. The external auditory canals were nonedematous and nonerythematous. The tympanic membranes are normal in appearance, bony landmarks are intact.  No retraction, perforation, or masses.  No fluid or purulence was seen in the external canal or the middle ear.   Neurologic - Cranial nerves II-XII are grossly intact. Specifically, the facial nerve is intact, House-Brackmann grade 1 of 6.   Neck -  Soft, nontender. Palpation of the occipital, submental, submandibular, internal jugular chain, and supraclavicular nodes did not demonstrate any abnormal  lymph nodes or masses. The parotid glands were without masses. Palpation of the thyroid was soft and smooth, with no nodules or goiter appreciated.  The trachea was midline.      Audiologic Studies - An audiogram and tympanogram was performed 9/15/21 and reviewed. The tympanogram shows a normal Type A curve, with normal canal volume and middle ear pressure. There is no sign of eustachian tube dysfunction or middle ear effusion. The audiogram showed a significant down-sloping mid to high frequency sensorineural hearing loss.  There was no evidence of conductive hearing loss or significant asymmetry.  When compared to his audiogram from 2015 this has worsened in the highest tones.    A/P -     ICD-10-CM    1. Benign paroxysmal positional vertigo, right  H81.11 Physical Therapy Referral       Gucci Logan is a 71 year old male with dizziness. This seems most likely right BPPV. I have recommended the Epley maneuver and PT if he cannot make improvements while at home.         Dudley Adams MD  Otolaryngology  [facility]        Again, thank you for allowing me to participate in the care of your patient.        Sincerely,        Dudley Adams MD

## 2022-01-20 ENCOUNTER — TELEPHONE (OUTPATIENT)
Dept: AUDIOLOGY | Facility: CLINIC | Age: 72
End: 2022-01-20
Payer: COMMERCIAL

## 2022-01-20 NOTE — TELEPHONE ENCOUNTER
Attempted to reach patient again and unable to leave voice message.    Aureliano Garcia MA, CCC-A  [place] Licensed Audiologist #[id number]  [facility]        1/25/2022      -------------------------------------------------------------      I returned patient's call.  Unfortunately there was no answer and no voicemail option.  I will try again, however it would be easiest if patient would leave complete details in  a message or send details via [employer].    Aureliano Garcia MA, CCC-A  [place] Licensed Audiologist #[id number]  [facility]        1/24/2022          Reason for Call:  Other call back    Detailed comments: patient needs info for insurance to get replacement hearing aids, just had hearing test a few weeks ago, however needs a call back to discuss things he needs for ins. Company what was given to him last did not pass with the [employer] company.     Phone Number Patient can be reached at: Home number on file [phone] (home)    Best Time: anytime    Can we leave a detailed message on this number? YES    Call taken on 1/20/2022 at 12:02 PM by Tahmina Frederick

## 2022-01-24 ENCOUNTER — TRANSFERRED RECORDS (OUTPATIENT)
Dept: HEALTH INFORMATION MANAGEMENT | Facility: CLINIC | Age: 72
End: 2022-01-24
Payer: COMMERCIAL

## 2022-02-07 DIAGNOSIS — I10 HYPERTENSION GOAL BP (BLOOD PRESSURE) < 140/90: ICD-10-CM

## 2022-02-07 DIAGNOSIS — E78.5 HYPERLIPIDEMIA LDL GOAL <100: ICD-10-CM

## 2022-02-08 RX ORDER — HYDROCHLOROTHIAZIDE 50 MG/1
TABLET ORAL
Qty: 90 TABLET | Refills: 0 | Status: SHIPPED | OUTPATIENT
Start: 2022-02-08 | End: 2022-04-20

## 2022-02-08 RX ORDER — ATORVASTATIN CALCIUM 10 MG/1
TABLET, FILM COATED ORAL
Qty: 90 TABLET | Refills: 1 | Status: SHIPPED | OUTPATIENT
Start: 2022-02-08 | End: 2022-07-06

## 2022-02-09 NOTE — TELEPHONE ENCOUNTER
Prescription approved per [facility] Refill Protocol.    Nelida Mayorga RN

## 2022-02-24 ENCOUNTER — TELEPHONE (OUTPATIENT)
Dept: FAMILY MEDICINE | Facility: CLINIC | Age: 72
End: 2022-02-24
Payer: COMMERCIAL

## 2022-02-24 DIAGNOSIS — K57.32 DIVERTICULITIS OF COLON: ICD-10-CM

## 2022-02-24 NOTE — TELEPHONE ENCOUNTER
Spoke to patient to get names of medications that need to be refilled. Patient would not give me the names of the medications. He said [employer] will be sending over the request. Explained to patient that without the names of the medications a refill request will not be placed.     Sanjeev-

## 2022-02-24 NOTE — TELEPHONE ENCOUNTER
Reason for Call:  Other call back    Detailed comments: dear Dr Jaramillo if this finds you well, my duprees disease is worsening to a point it has my attention, spoke with pharmasist for temporary relief so I need a refill on the medication for that, please approve it for [facility] pharmacy; this is what the patient wanted me to relay to Dr Jaramillo.    Phone Number Patient can be reached at: Cell number on file:    Telephone Information:   Mobile [phone]       Best Time: anytime    Can we leave a detailed message on this number? Not Applicable    Call taken on 2/24/2022 at 11:32 AM by Tahmina Frederick

## 2022-02-24 NOTE — LETTER
[facility]  [address] [facility] [address]  [phone]          March 3, 2022    Gucci Logan                                                                                                                     [address] [place] [id number]            Dear Gucci,    Your provider wanted us to relay the following message to you;    Okayed a limited tramadol prescription.  Diclofenac gel is now over the counter.     Patient should see us soon in clinic.        Sincerely,         Richi Jaramillo MD

## 2022-02-25 RX ORDER — TRAMADOL HYDROCHLORIDE 50 MG/1
50 TABLET ORAL EVERY 6 HOURS PRN
Qty: 20 TABLET | Refills: 0 | Status: SHIPPED | OUTPATIENT
Start: 2022-02-25 | End: 2022-03-21

## 2022-02-25 NOTE — TELEPHONE ENCOUNTER
Attempted to call patient ([phone]).    Patient unavailable and unable to leave voicemail.    Will need to re-attempt at a later time.    PETER QuijanoN CASANDRA  [facility]

## 2022-02-25 NOTE — TELEPHONE ENCOUNTER
Okayed a limited tramadol prescription.  Diclofenac gel is now over the counter.    Patient should see us soon in clinic.    Please inform patient    Richi Jaramillo MD

## 2022-02-25 NOTE — TELEPHONE ENCOUNTER
Received fax from "[employer]" stating that he needs Dicofenac refilled an Tramadol 50 mg 1 tab by mouth every 6 hours as needed for pain. Please advise.         Faiza DUNBAR CMA ([facility])

## 2022-02-28 NOTE — TELEPHONE ENCOUNTER
Second attempt to call patient, no answer and unable to leave voicemail. Will need to re-attempt at a later time.    Sahara Lomax RN   [facility]

## 2022-03-02 NOTE — TELEPHONE ENCOUNTER
We have not been able to reach patient by phone    - please mail letter with provider's message below    Sigifredo Hinds RN  [facility]

## 2022-03-18 DIAGNOSIS — K57.32 DIVERTICULITIS OF COLON: ICD-10-CM

## 2022-03-21 RX ORDER — TRAMADOL HYDROCHLORIDE 50 MG/1
TABLET ORAL
Qty: 20 TABLET | Refills: 0 | Status: SHIPPED | OUTPATIENT
Start: 2022-03-21 | End: 2022-04-20

## 2022-03-21 NOTE — TELEPHONE ENCOUNTER
Routing refill request to provider for review/approval because:  Drug not on the [facility] refill protocol   traMADol (ULTRAM) 50 MG tablet 20 tablet 0 2/25/2022     LAST OV-8/26/21

## 2022-03-23 NOTE — TELEPHONE ENCOUNTER
Called and was unable to leave vm due to vm box not being setup. If pt calls let them know there medication was filled and they need to schedule appointment with pcp for refills on medication.    Karie CHAN MA

## 2022-04-04 ENCOUNTER — TELEPHONE (OUTPATIENT)
Dept: FAMILY MEDICINE | Facility: CLINIC | Age: 72
End: 2022-04-04
Payer: COMMERCIAL

## 2022-04-04 DIAGNOSIS — E11.43 TYPE 2 DIABETES MELLITUS WITH DIABETIC AUTONOMIC NEUROPATHY, WITHOUT LONG-TERM CURRENT USE OF INSULIN (H): ICD-10-CM

## 2022-04-04 DIAGNOSIS — R53.83 FATIGUE, UNSPECIFIED TYPE: ICD-10-CM

## 2022-04-04 DIAGNOSIS — E78.5 HYPERLIPIDEMIA LDL GOAL <100: Primary | ICD-10-CM

## 2022-04-04 NOTE — TELEPHONE ENCOUNTER
Patient can do a lab only appointment ( need not be fasting ) but he should see us for an in clinic visit a few days after that.  Blood pressure out of control etc.  I have not seen patient for quite a while.    Please inform patient.    Richi Jaramillo MD

## 2022-04-04 NOTE — TELEPHONE ENCOUNTER
Reason for Call:  Other call back    Detailed comments: patient wants lab work done on A1C, CBC, PSA, would like to check fluids, he would like maybe a zoom meeting ie virtual he can only receive text messages and is tired of playing phone tag. Would like to get blood work done and then have the virtual visit. He tested positive for covid March 8th and then 2weeks later did home test and was neg twice, just dealing with residual effects now.     Phone Number Patient can be reached at: Cell number on file:    Telephone Information:   Mobile [phone]       Best Time: anytime    Can we leave a detailed message on this number? NO    Call taken on 4/4/2022 at 10:23 AM by Tahmina Frederick

## 2022-04-05 NOTE — TELEPHONE ENCOUNTER
Called patient to schedule both lab appointment and appointment with Dr. Jaramillo per providers message. Patient was busy. Will call again later to assist in scheduling    Sanjeev-

## 2022-04-07 NOTE — TELEPHONE ENCOUNTER
Called patient to schedule lab and clinic appointment. VM has not been set up. If patient calls back please assist in scheduling both appointments per providers message below     Sanjeev-

## 2022-04-14 ENCOUNTER — LAB (OUTPATIENT)
Dept: LAB | Facility: CLINIC | Age: 72
End: 2022-04-14
Payer: COMMERCIAL

## 2022-04-14 DIAGNOSIS — R53.83 FATIGUE, UNSPECIFIED TYPE: ICD-10-CM

## 2022-04-14 DIAGNOSIS — E11.43 TYPE 2 DIABETES MELLITUS WITH DIABETIC AUTONOMIC NEUROPATHY, WITHOUT LONG-TERM CURRENT USE OF INSULIN (H): ICD-10-CM

## 2022-04-14 LAB
ALBUMIN SERPL-MCNC: 3.4 G/DL (ref 3.4–5)
ALP SERPL-CCNC: 98 U/L (ref 40–150)
ALT SERPL W P-5'-P-CCNC: 82 U/L (ref 0–70)
ANION GAP SERPL CALCULATED.3IONS-SCNC: 6 MMOL/L (ref 3–14)
AST SERPL W P-5'-P-CCNC: 61 U/L (ref 0–45)
BASOPHILS # BLD AUTO: 0 10E3/UL (ref 0–0.2)
BASOPHILS NFR BLD AUTO: 1 %
BILIRUB SERPL-MCNC: 1.3 MG/DL (ref 0.2–1.3)
BUN SERPL-MCNC: 27 MG/DL (ref 7–30)
CALCIUM SERPL-MCNC: 9.2 MG/DL (ref 8.5–10.1)
CHLORIDE BLD-SCNC: 111 MMOL/L (ref 94–109)
CO2 SERPL-SCNC: 25 MMOL/L (ref 20–32)
CREAT SERPL-MCNC: 1.07 MG/DL (ref 0.66–1.25)
EOSINOPHIL # BLD AUTO: 0.1 10E3/UL (ref 0–0.7)
EOSINOPHIL NFR BLD AUTO: 4 %
ERYTHROCYTE [DISTWIDTH] IN BLOOD BY AUTOMATED COUNT: 13.7 % (ref 10–15)
GFR SERPL CREATININE-BSD FRML MDRD: 75 ML/MIN/1.73M2
GLUCOSE BLD-MCNC: 189 MG/DL (ref 70–99)
HBA1C MFR BLD: 7.5 % (ref 0–5.6)
HCT VFR BLD AUTO: 38.3 % (ref 40–53)
HGB BLD-MCNC: 12.8 G/DL (ref 13.3–17.7)
HOLD SPECIMEN: NORMAL
HOLD SPECIMEN: NORMAL
IMM GRANULOCYTES # BLD: 0 10E3/UL
IMM GRANULOCYTES NFR BLD: 0 %
LYMPHOCYTES # BLD AUTO: 0.6 10E3/UL (ref 0.8–5.3)
LYMPHOCYTES NFR BLD AUTO: 20 %
MCH RBC QN AUTO: 32.8 PG (ref 26.5–33)
MCHC RBC AUTO-ENTMCNC: 33.4 G/DL (ref 31.5–36.5)
MCV RBC AUTO: 98 FL (ref 78–100)
MONOCYTES # BLD AUTO: 0.3 10E3/UL (ref 0–1.3)
MONOCYTES NFR BLD AUTO: 10 %
NEUTROPHILS # BLD AUTO: 2.1 10E3/UL (ref 1.6–8.3)
NEUTROPHILS NFR BLD AUTO: 65 %
NRBC # BLD AUTO: 0 10E3/UL
NRBC BLD AUTO-RTO: 0 /100
PLATELET # BLD AUTO: 56 10E3/UL (ref 150–450)
POTASSIUM BLD-SCNC: 4.3 MMOL/L (ref 3.4–5.3)
PROT SERPL-MCNC: 7.3 G/DL (ref 6.8–8.8)
RBC # BLD AUTO: 3.9 10E6/UL (ref 4.4–5.9)
SODIUM SERPL-SCNC: 142 MMOL/L (ref 133–144)
WBC # BLD AUTO: 3.1 10E3/UL (ref 4–11)

## 2022-04-14 PROCEDURE — 85025 COMPLETE CBC W/AUTO DIFF WBC: CPT

## 2022-04-14 PROCEDURE — 83036 HEMOGLOBIN GLYCOSYLATED A1C: CPT

## 2022-04-14 PROCEDURE — 80053 COMPREHEN METABOLIC PANEL: CPT

## 2022-04-14 PROCEDURE — 36415 COLL VENOUS BLD VENIPUNCTURE: CPT

## 2022-04-20 ENCOUNTER — VIRTUAL VISIT (OUTPATIENT)
Dept: FAMILY MEDICINE | Facility: CLINIC | Age: 72
End: 2022-04-20
Payer: COMMERCIAL

## 2022-04-20 DIAGNOSIS — I10 HYPERTENSION GOAL BP (BLOOD PRESSURE) < 140/90: ICD-10-CM

## 2022-04-20 DIAGNOSIS — E78.5 HYPERLIPIDEMIA LDL GOAL <100: ICD-10-CM

## 2022-04-20 DIAGNOSIS — K21.9 GASTROESOPHAGEAL REFLUX DISEASE WITHOUT ESOPHAGITIS: ICD-10-CM

## 2022-04-20 DIAGNOSIS — M24.549 CONTRACTURE OF HAND: ICD-10-CM

## 2022-04-20 DIAGNOSIS — E11.43 TYPE 2 DIABETES MELLITUS WITH DIABETIC AUTONOMIC NEUROPATHY, WITHOUT LONG-TERM CURRENT USE OF INSULIN (H): Primary | ICD-10-CM

## 2022-04-20 DIAGNOSIS — Z12.11 SCREEN FOR COLON CANCER: ICD-10-CM

## 2022-04-20 PROCEDURE — 99213 OFFICE O/P EST LOW 20 MIN: CPT | Mod: 95 | Performed by: FAMILY MEDICINE

## 2022-04-20 RX ORDER — METFORMIN HCL 500 MG
500 TABLET, EXTENDED RELEASE 24 HR ORAL
Qty: 90 TABLET | Refills: 1 | Status: SHIPPED | OUTPATIENT
Start: 2022-04-20 | End: 2022-05-19

## 2022-04-20 RX ORDER — SPIRONOLACTONE 25 MG/1
25 TABLET ORAL DAILY
Qty: 90 TABLET | Refills: 1 | Status: SHIPPED | OUTPATIENT
Start: 2022-04-20 | End: 2022-07-06

## 2022-04-20 RX ORDER — METOPROLOL SUCCINATE 100 MG/1
100 TABLET, EXTENDED RELEASE ORAL DAILY
Qty: 90 TABLET | Refills: 0 | Status: SHIPPED | OUTPATIENT
Start: 2022-04-20 | End: 2022-07-06

## 2022-04-20 RX ORDER — HYDROCHLOROTHIAZIDE 50 MG/1
50 TABLET ORAL DAILY
Qty: 90 TABLET | Refills: 1 | Status: SHIPPED | OUTPATIENT
Start: 2022-04-20 | End: 2022-09-29

## 2022-04-20 NOTE — PROGRESS NOTES
"Canelo is a 71 year old who is being evaluated via a billable video visit.      How would you like to obtain your AVS? MyChart  If the video visit is dropped, the invitation should be resent by: Send to e-mail at: [email or web address]  Will anyone else be joining your video visit? No     Video Start Time: 3:52 PM         Subjective   Canelo is a 71 year old who presents for the following health issues     HPI      Review of Systems      Just started taking metformin again last weeks    Working on farm    Had vertigo    Cut forearm, got stitched up at emergency room    Sees foot specialist nurse     Has contractures on the hands, quite painful    Seeing psychiatry    Had covid in March, was quite sick    Has post covid symptoms     Swelling about same on legs, maybe \"faster\"    Elevate earlier in day than used to           Objective           Vitals:  No vitals were obtained today due to virtual visit.    Physical Exam   GENERAL: Healthy, alert and no distress  EYES: Eyes grossly normal to inspection.  No discharge or erythema, or obvious scleral/conjunctival abnormalities.  RESP: No audible wheeze, cough, or visible cyanosis.  No visible retractions or increased work of breathing.    SKIN: Visible skin clear. No significant rash, abnormal pigmentation or lesions.  NEURO: Cranial nerves grossly intact.  Mentation and speech appropriate for age.  PSYCH: Mentation appears normal, affect normal/bright, judgement and insight intact, normal speech and appearance well-groomed.  Patient showed me the contractures on his hands       ASSESSMENT / PLAN:  (E11.43) Type 2 diabetes mellitus with diabetic autonomic neuropathy, without long-term current use of insulin (H)  (primary encounter diagnosis)  Comment: simplify med by going to 500 xr once daily.  Reviewed recent labs in great detail.   Plan: metFORMIN (GLUCOPHAGE-XR) 500 MG 24 hr tablet             (Z12.11) Screen for colon cancer  Comment: patient wants to do " colonoscopy at [facility]; he will schedule   Plan: Adult Gastro Ref - Procedure Only             (I10) Hypertension goal BP (blood pressure) < 140/90  Comment: refill meds but increase metoprolol to 100 mg daily  Then do a nurse visit in a few weeks to check blood pressure   Plan: hydrochlorothiazide (HYDRODIURIL) 50 MG tablet,        spironolactone (ALDACTONE) 25 MG tablet,         metoprolol succinate ER (TOPROL-XL) 100 MG 24         hr tablet             (K21.9) Gastroesophageal reflux disease without esophagitis  Comment: refill ppi   Plan: omeprazole (PRILOSEC) 20 MG DR capsule             (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    Richi Jaramillo MD              Video-Visit Details    Type of service:  Video Visit    Video End Time:4:21 PM    Originating Location (pt. Location): Home    Distant Location (provider location):  [facility]     Platform used for Video Visit: [employer]

## 2022-04-27 NOTE — RESULT ENCOUNTER NOTE
Nothing real worrisome on lab results.    See the surgeon and liver specialists as planned.    Richi Jaramillo MD   alert , in no acute distress , well developed, well nourished

## 2022-05-04 DIAGNOSIS — E11.9 TYPE 2 DIABETES MELLITUS WITHOUT COMPLICATION, WITHOUT LONG-TERM CURRENT USE OF INSULIN (H): ICD-10-CM

## 2022-05-04 NOTE — TELEPHONE ENCOUNTER
Blood glucose monitor,lancets and strips      Last Written Prescription Date:    Last Fill Quantity: ,   # refills:   Last Office Visit: 4/20/22  Future Office visit:       Routing refill request to provider for review/approval because:  Drug not active on patient's medication list  [employer]

## 2022-05-04 NOTE — TELEPHONE ENCOUNTER
Not on active med list. Reported not using 8/5/21. Routing to provider to address.     Wen FRY RN  [facility]

## 2022-05-05 DIAGNOSIS — E11.43 TYPE 2 DIABETES MELLITUS WITH DIABETIC AUTONOMIC NEUROPATHY, WITHOUT LONG-TERM CURRENT USE OF INSULIN (H): ICD-10-CM

## 2022-05-16 ENCOUNTER — TELEPHONE (OUTPATIENT)
Dept: FAMILY MEDICINE | Facility: CLINIC | Age: 72
End: 2022-05-16
Payer: COMMERCIAL

## 2022-05-16 DIAGNOSIS — E11.43 TYPE 2 DIABETES MELLITUS WITH DIABETIC AUTONOMIC NEUROPATHY, WITHOUT LONG-TERM CURRENT USE OF INSULIN (H): Primary | ICD-10-CM

## 2022-05-16 NOTE — TELEPHONE ENCOUNTER
Reason for Call:  Other call back    Detailed comments: patient is wondering if Dr Jaramillo could please work on the request that was sent today, and on 05/05/, 05/04, and 4/15. He also needs some test strips. If someone could please contact patient with info that would be greatly appreciated. P.S. please text the phone number or email so patient  Receives info, otherwise he will not get message.    Phone Number Patient can be reached at: Cell number on file:    Telephone Information:   Mobile [phone]       Best Time: anytime    Can we leave a detailed message on this number? YES    Call taken on 5/16/2022 at 11:41 AM by Tahmina Frederick

## 2022-05-16 NOTE — TELEPHONE ENCOUNTER
This writer attempted to contact Patient on 05/16/22      Reason for call message below and unable to leave message.      If patient calls back:   Patient got medication refilled on 04/20/2022 with one refill. Messages dated from 05/05, 05/04, 04/14 are medication requests they have been refilled during his telephone visit on 04/20/2022. Test strips were also filled on the 05/04/2022. Calling to get more on this message.          Claire Hutchison

## 2022-05-17 ENCOUNTER — TELEPHONE (OUTPATIENT)
Dept: FAMILY MEDICINE | Facility: CLINIC | Age: 72
End: 2022-05-17
Payer: COMMERCIAL

## 2022-05-17 DIAGNOSIS — K57.32 DIVERTICULITIS OF COLON: ICD-10-CM

## 2022-05-17 DIAGNOSIS — E11.9 TYPE 2 DIABETES MELLITUS WITHOUT COMPLICATION, WITHOUT LONG-TERM CURRENT USE OF INSULIN (H): ICD-10-CM

## 2022-05-17 DIAGNOSIS — E11.43 TYPE 2 DIABETES MELLITUS WITH DIABETIC AUTONOMIC NEUROPATHY, WITHOUT LONG-TERM CURRENT USE OF INSULIN (H): Primary | ICD-10-CM

## 2022-05-17 NOTE — TELEPHONE ENCOUNTER
"Routing to PCP      Patient started getting sick after taking metformin XR, he stopped taking this and went back to his old metformin 500 mg daily.  He was having increased thirst, nausea,  Ect, this happened very quickly for him.     Prescription needed for regular release metformin 500 mg as pt can tolerate this dose and type of med    He is also experiencing increased bilateral hand pain \"deep groves in hands\".  He would like to get another prescription for pain managament      He is going to go and get his Masters in Psychology - FYI  [facility] [place]        Metformin 500 mg tabs pended   Tramadol pended       Heidi RN    Triage Nurse  [facility]  Appointment line: [phone]  [facility], 24 hour nurse line, available by calling clinic at [phone] and following prompts.       "

## 2022-05-17 NOTE — TELEPHONE ENCOUNTER
Pt called back. Stated the current Metformin he is on is not working. Pt would like to go back to the previous rx he was taking. metFORMIN (GLUCOPHAGE) 500 MG tablets. Stated the Pharmacy gave him a few 1000 tables for him to cut in half. Pt also stated he didn't get a rx for test strips. Pt stated he is frustrated with Dr Jaramillo's care team. Stated he has issues every time he needs refills. Would like someone from the team to contact the Pharmacy to clarify which rx's he needs. Stated he shouldn't have to be in the middle of this. When I asked for a call back number pt stated we could e-mail or text him. Informed pt we don't e-mail or text pts.Pt stated we are going backwards as a company and should be able to e-mail or text him. I did ask pt about My Chart. Pt declined. Stated he doesn't have voicemail and to call him after 5p.

## 2022-05-17 NOTE — TELEPHONE ENCOUNTER
I called and tired to clarify his message. Patient was very rude and kept disrespecting me multiple times while yelling at me. I told the patient that I didn't deserve to be treated like this as I am only here to help and that I was going to let him go. Wished him a good day and ended the call. I didn't get any where with the phone call so I do not know what he is wanting.     Claire SNELL,

## 2022-05-18 NOTE — TELEPHONE ENCOUNTER
"Call was transferred to RN by patient rep due to patient upset over his meds.  Spoke with patient and he started the conversation upset wanting a message sent to Dr. Jaramillo.   Patient requesting that Dr. Jaramillo call him back so that he can say \"goodbye\"   Patient stated that he is done with the clinic and has talked with several staff members, \"will get everyone in between fired\"  He stated that he will call the news channel, pharmacy, his insurance to let them know that he is done with Dr. Jaramillo and file a complaint against the clinic  He voiced that he is dissatisfied with how his prescriptions are being handled   Attempted to assist multiple times and get clarification on what he needed but he stated all he wanted was to get a call back from Dr. Jaramillo and to get a phone number to someone he can file a complaint with.  Offered Patient Relations number but patient stated he does not have anything to write with.   Offered to leave the number on his VM but he got upset, stating that he does not have VM and that the clinic is going back in time wanting to still use VM's.  He asked that the number be sent via text to his phone.   Explained that we are not able to send this info via text. Patient upset about this   He stated that the clinic needs to spend money to make texting happen.  He also stated that calling Patient Relations will do no good if it is not a number specifically to someone at the [facility].   Per patient, someone from the clinic tried calling him after 6:00 PM and he is upset about such a late call as well.  He asked for writer's last name and employee number.  Provided RN's name and title again but explained that writer will not be providing him with any employee numbers.  Patient continued to be upset and ended the conversation.    Sigifredo Hinds RN  [facility]    "

## 2022-05-19 RX ORDER — TRAMADOL HYDROCHLORIDE 50 MG/1
TABLET ORAL
Qty: 20 TABLET | Refills: 0 | Status: SHIPPED | OUTPATIENT
Start: 2022-05-19 | End: 2022-06-07

## 2022-05-19 NOTE — TELEPHONE ENCOUNTER
I spoke with patient  Also see other encounter  Okay tramadol, see me in a couple months in clinic  Richi Jaramillo MD

## 2022-05-19 NOTE — TELEPHONE ENCOUNTER
Patient finally answered phone  He clarified that he reacted to the xr form of metformin but did fine with immediate release  Thus I sent in prescription for IR form  Also he has prescription for meter and strips but needs lancets  See me in 2 months, sooner if needed   Richi Jaraimllo MD

## 2022-05-21 ENCOUNTER — TRANSFERRED RECORDS (OUTPATIENT)
Dept: HEALTH INFORMATION MANAGEMENT | Facility: CLINIC | Age: 72
End: 2022-05-21
Payer: COMMERCIAL

## 2022-06-01 ENCOUNTER — TRANSFERRED RECORDS (OUTPATIENT)
Dept: MULTI SPECIALTY CLINIC | Facility: CLINIC | Age: 72
End: 2022-06-01

## 2022-06-01 LAB — RETINOPATHY: NORMAL

## 2022-06-06 ENCOUNTER — TELEPHONE (OUTPATIENT)
Dept: PODIATRY | Facility: CLINIC | Age: 72
End: 2022-06-06
Payer: COMMERCIAL

## 2022-06-06 ENCOUNTER — TELEPHONE (OUTPATIENT)
Dept: FAMILY MEDICINE | Facility: CLINIC | Age: 72
End: 2022-06-06
Payer: COMMERCIAL

## 2022-06-06 DIAGNOSIS — E11.43 TYPE 2 DIABETES MELLITUS WITH DIABETIC AUTONOMIC NEUROPATHY, WITHOUT LONG-TERM CURRENT USE OF INSULIN (H): Primary | ICD-10-CM

## 2022-06-06 DIAGNOSIS — M24.571 EQUINUS CONTRACTURE OF RIGHT ANKLE: ICD-10-CM

## 2022-06-06 DIAGNOSIS — E66.9 OBESITY, UNSPECIFIED OBESITY SEVERITY, UNSPECIFIED OBESITY TYPE: ICD-10-CM

## 2022-06-06 NOTE — TELEPHONE ENCOUNTER
Attempted to contact patient. Voicemail is not set up.     Unable to find notes from [facility]. No recent PCP visit notes regarding neck pain    Please call patient again for triage.     Wen FRY RN  [facility]

## 2022-06-06 NOTE — TELEPHONE ENCOUNTER
Pt calling back. The number that was left for pt to call back is not a direct line to where he needed to call back

## 2022-06-06 NOTE — TELEPHONE ENCOUNTER
Spoke with patient. Patient requires foot care in [facility]. He spoke with [facility] and order is needed for foot specialist. Patient sees doctor Susie. Request has been routed to his team.     See note below    Wen FRY RN  [facility]

## 2022-06-06 NOTE — TELEPHONE ENCOUNTER
Patient returned call.    Patient has neck pain, has history of neck issues.  Tweaked neck when driving.     Was seen by [facility] 5/28/22 and then  Dr Krause at [facility]. Was treated with prednisone and baclofen. Symptoms had improved but came back 6/1/22. He has been working about 10 hours/day.     Requesting refill of medications. If second round of medications not helpful, wondering if he should be seen by PCP or spine specialist.     Wen FRY RN  [facility]

## 2022-06-06 NOTE — TELEPHONE ENCOUNTER
Reason for Call: Request for an order or referral: Order/Letter    Order or referral being requested: Pt needs order/letter sent to [facility] for certified foot care specialist.     Date needed: ASAP    Has the patient been seen by the PCP for this problem? Yes    Additional comments: Pt needs letter sent by Dr. Jaramillo or Dr. Richards (Prefers Dr. Richards to send)    Phone number Patient can be reached at:  [phone] (Central Intake-Call First)     [facility]- [phone]    Best Time: N/A    Can we leave a detailed message on this number?  N/A    Call taken on 6/6/2022 at 12:00 PM by Ernesto Grant

## 2022-06-06 NOTE — TELEPHONE ENCOUNTER
Reason for Call:  Other call back    Detailed comments: patient states he was seen at  ortho for his neck, pt still having pain and swelling and would like prednisone.      Phone Number Patient can be reached at: Cell number on file:    Telephone Information:   Mobile [phone]       Best Time: any    Can we leave a detailed message on this number? YES    Call taken on 6/6/2022 at 9:13 AM by Kathie Nunez

## 2022-06-07 ENCOUNTER — TELEPHONE (OUTPATIENT)
Dept: FAMILY MEDICINE | Facility: CLINIC | Age: 72
End: 2022-06-07
Payer: COMMERCIAL

## 2022-06-07 DIAGNOSIS — E11.43 TYPE 2 DIABETES MELLITUS WITH DIABETIC AUTONOMIC NEUROPATHY, WITHOUT LONG-TERM CURRENT USE OF INSULIN (H): Primary | ICD-10-CM

## 2022-06-07 NOTE — TELEPHONE ENCOUNTER
Spoke with Canelo, who states he is requesting a referral to [facility] for his foot care/ nail trim.  Explained to Canelo that is not a service that home care provides.  Canelo was very certain that they do   For he called his Insurance and spoke with someone at [facility] intake,where he was instructed they do as long as  provides the information/DX that Canelo does not  have the ability to cut his own nails.

## 2022-06-07 NOTE — TELEPHONE ENCOUNTER
"  [facility].........................[phone]  They travel to patients homes as well as the following community/Senior Centers.   Need to call [facility] to set up appointments. For in home or Centers.    [facility]    Gala Frye -[phone]    [facility]    Shelbi Barrientos RN, BSN, -[phone]    [facility]:    [place].......[phone]    [place]................[phone]    [place]-.....[phone]    [place]........................[phone]    [facility].................[phone]      ** YOU MUST CALL FOR INFORMATION ON DAYS, TIMES AND COST OF SERVICE**      For up to date list of Foot Care Rn's. Visit the website    American Foot Care Nurses Association website    Afcna.org    Click on the \"Find a foot care provider\" Link      Katie Pruitt RN,[facility] [phone] (Text or call) Has limited home visit.    Francine Zavala RN,[facility] [phone]    Natalia Reynoso [phone]    Marissa GutiérrezRN,BSN,[facility] [phone]    [facility] [phone]    Glory Mendoza [phone]    Keri Connors [phone]    Abbie [phone]    **THIS IS A PRIVATE PAY SERVICE- NOT BILLABLE TO MEDICARE OR INSURANCE**        Canelo is looking for a list of Foot Care Rn's  "

## 2022-06-07 NOTE — TELEPHONE ENCOUNTER
Patient calling to request authorization for diabetic foot [facility] Phone # [phone] fax # [phone].

## 2022-06-07 NOTE — TELEPHONE ENCOUNTER
Spoke with Stephanie at Home care, She suggested to place the referral to Home care for Routine foot care.      They will review the referral and get back to Canelo and podiatry if there is coverage or not.    Referral placed and copy sent to Canelo via email    Tiffanie Bobby CMA

## 2022-06-08 NOTE — TELEPHONE ENCOUNTER
Advise in person appointment with any available provider    Please inform patient    Richi Jaramillo MD

## 2022-06-08 NOTE — TELEPHONE ENCOUNTER
Referral pended for diabetic shoes. Once the order is approved and signed, please have TC fax order to preferred location:    [facility].   Phone # [phone]   Fax # [phone]    Nerissa AVALOS RN, BSN  [facility]

## 2022-06-09 ENCOUNTER — TELEPHONE (OUTPATIENT)
Dept: FAMILY MEDICINE | Facility: CLINIC | Age: 72
End: 2022-06-09
Payer: COMMERCIAL

## 2022-06-09 DIAGNOSIS — J20.9 ACUTE BRONCHITIS, UNSPECIFIED ORGANISM: Primary | ICD-10-CM

## 2022-06-09 RX ORDER — METHYLPREDNISOLONE 4 MG
TABLET, DOSE PACK ORAL
Qty: 21 TABLET | Refills: 0 | Status: SHIPPED | OUTPATIENT
Start: 2022-06-09 | End: 2022-09-29

## 2022-06-09 NOTE — TELEPHONE ENCOUNTER
Routing to PCP to change medication since it is not available for re-order.      Nerissa AVALOS RN, BSN  [facility]

## 2022-06-10 ENCOUNTER — TELEPHONE (OUTPATIENT)
Dept: FAMILY MEDICINE | Facility: CLINIC | Age: 72
End: 2022-06-10
Payer: COMMERCIAL

## 2022-06-10 ENCOUNTER — TELEPHONE (OUTPATIENT)
Dept: PODIATRY | Facility: CLINIC | Age: 72
End: 2022-06-10
Payer: COMMERCIAL

## 2022-06-10 NOTE — TELEPHONE ENCOUNTER
Tired calling patient about message below. Patient was very rude and would did not want to listen to the message.     Claire SNELL,

## 2022-06-10 NOTE — TELEPHONE ENCOUNTER
Canelo states we need to fax his order for home care to them.  Explained to Canelo that the order was sent to Home care on Tuesday    Will reach out to home care to verify if the order drops in the que or if needs to be faxed to that dept    Tiffanie Bobby, CMA

## 2022-06-10 NOTE — TELEPHONE ENCOUNTER
Best method is to fax referral. [place]  ([facility]) intake fax is . Attempted to call pt but he has no mail box    Gala DANGELO RN Specialty Triage 6/10/2022 2:40 PM

## 2022-06-10 NOTE — TELEPHONE ENCOUNTER
"This message is regarding telephone visit 06/06/2022. This is not the first time patient is rude when calling patient. There is protocol when anyone answers the phone. We have to verify the patient. When doing so patient gets very upset and answers with \"no one else answers this phone but me and this needs to be put somewhere\". Writer went to read off [employer] message regarding patient needed to be seen in clinic regarding his neck pain. Patient stated that someone called him stating that someone already perseribed him prednisone so he was confused. Writer looked into his chart and seen that deal sent in methylPREDNISolone (MEDROL DOSEPAK) 4 MG tablet therapy pack. Patient asked what that was writer read off the DX code to patient. Then patient asked more questions as writer wasn't sure why he was prescribed that and writer couldn't answer those questions and writer continued to read off the telephone encounter from when patient call to it being routed to the RNs then to Dr dunn. Then Patient stated would it be better to see a specialist then. Writer stated to patient writer can not answer that to patient but if he feels that would be a better route for him then he can do that but dr dunn stated that he advised you be seen in person with any available provider. Patient then got highly upset and stated he was going to  and in hopes writer gets fired and that I never talk to him again.   "

## 2022-06-10 NOTE — TELEPHONE ENCOUNTER
"Reason for Call:  Other Communication    Detailed comments: PT called in and very upset about a previous call.  Said from Dr Jaramillo's team and not Susie.  PT stated would report Dr Jaramillo again and used profanity multiple times.  PT stated this had nothing to do with bronchitis.  PT stated \"recording\" our call and did not know what the call was for.  PT would like a call from Dr Jaramillo at 6pm.    Phone Number Patient can be reached at: Home number on file [phone] (home)    Best Time: 6 pm    Can we leave a detailed message on this number? NO    Call taken on 6/10/2022 at 10:43 AM by Smiley Florentino      "

## 2022-06-10 NOTE — TELEPHONE ENCOUNTER
Blake with [facility] called and said they received a referral for diabetic nail care. They are unable to accept the patient because it was denied by insurance.    Will route to provider/team to follow up.       Nerissa AVALOS RN, BSN  [facility]

## 2022-06-11 NOTE — TELEPHONE ENCOUNTER
Please send this to Zita, clinic manager  This patient has been repeatedly rude to staff    We should dismiss him    Thanks    Richi Jaramillo MD

## 2022-06-17 ENCOUNTER — MEDICAL CORRESPONDENCE (OUTPATIENT)
Dept: FAMILY MEDICINE | Facility: CLINIC | Age: 72
End: 2022-06-17

## 2022-06-29 ENCOUNTER — TELEPHONE (OUTPATIENT)
Dept: FAMILY MEDICINE | Facility: CLINIC | Age: 72
End: 2022-06-29

## 2022-06-29 NOTE — TELEPHONE ENCOUNTER
Tired reaching out to patient regarding DME order. Patient hung up phone before being able to inform patient.     If patient calls back inform him that we received a fax back stating he needed a diabetic foot exam and a diabetic follow up in order for them to process the DME (diabetic shoes) request.     Claire SNELL,

## 2022-07-06 ENCOUNTER — OFFICE VISIT (OUTPATIENT)
Dept: FAMILY MEDICINE | Facility: CLINIC | Age: 72
End: 2022-07-06
Payer: COMMERCIAL

## 2022-07-06 VITALS
BODY MASS INDEX: 37.29 KG/M2 | OXYGEN SATURATION: 99 % | DIASTOLIC BLOOD PRESSURE: 60 MMHG | HEIGHT: 71 IN | WEIGHT: 266.38 LBS | TEMPERATURE: 101.3 F | HEART RATE: 76 BPM | SYSTOLIC BLOOD PRESSURE: 146 MMHG

## 2022-07-06 DIAGNOSIS — R50.9 FEVER, UNSPECIFIED FEVER CAUSE: ICD-10-CM

## 2022-07-06 DIAGNOSIS — E03.9 HYPOTHYROIDISM, UNSPECIFIED TYPE: ICD-10-CM

## 2022-07-06 DIAGNOSIS — E11.43 TYPE 2 DIABETES MELLITUS WITH DIABETIC AUTONOMIC NEUROPATHY, WITHOUT LONG-TERM CURRENT USE OF INSULIN (H): Primary | ICD-10-CM

## 2022-07-06 DIAGNOSIS — K21.9 GASTROESOPHAGEAL REFLUX DISEASE WITHOUT ESOPHAGITIS: ICD-10-CM

## 2022-07-06 DIAGNOSIS — R53.83 FATIGUE, UNSPECIFIED TYPE: ICD-10-CM

## 2022-07-06 DIAGNOSIS — I10 HYPERTENSION GOAL BP (BLOOD PRESSURE) < 140/90: ICD-10-CM

## 2022-07-06 DIAGNOSIS — M79.2 NERVE PAIN: ICD-10-CM

## 2022-07-06 DIAGNOSIS — E78.5 HYPERLIPIDEMIA LDL GOAL <100: ICD-10-CM

## 2022-07-06 LAB
ALBUMIN SERPL-MCNC: 3.2 G/DL (ref 3.4–5)
ALP SERPL-CCNC: 93 U/L (ref 40–150)
ALT SERPL W P-5'-P-CCNC: 91 U/L (ref 0–70)
ANION GAP SERPL CALCULATED.3IONS-SCNC: 7 MMOL/L (ref 3–14)
AST SERPL W P-5'-P-CCNC: 65 U/L (ref 0–45)
BASOPHILS # BLD AUTO: 0 10E3/UL (ref 0–0.2)
BASOPHILS NFR BLD AUTO: 0 %
BILIRUB SERPL-MCNC: 1.9 MG/DL (ref 0.2–1.3)
BUN SERPL-MCNC: 24 MG/DL (ref 7–30)
CALCIUM SERPL-MCNC: 8.7 MG/DL (ref 8.5–10.1)
CHLORIDE BLD-SCNC: 107 MMOL/L (ref 94–109)
CHOLEST SERPL-MCNC: 143 MG/DL
CO2 SERPL-SCNC: 25 MMOL/L (ref 20–32)
CREAT SERPL-MCNC: 1.18 MG/DL (ref 0.66–1.25)
EOSINOPHIL # BLD AUTO: 0.1 10E3/UL (ref 0–0.7)
EOSINOPHIL NFR BLD AUTO: 1 %
ERYTHROCYTE [DISTWIDTH] IN BLOOD BY AUTOMATED COUNT: 13.4 % (ref 10–15)
FASTING STATUS PATIENT QL REPORTED: NO
GFR SERPL CREATININE-BSD FRML MDRD: 66 ML/MIN/1.73M2
GLUCOSE BLD-MCNC: 252 MG/DL (ref 70–99)
HBA1C MFR BLD: 6 % (ref 0–5.6)
HCT VFR BLD AUTO: 37 % (ref 40–53)
HDLC SERPL-MCNC: 61 MG/DL
HGB BLD-MCNC: 12.3 G/DL (ref 13.3–17.7)
IMM GRANULOCYTES # BLD: 0 10E3/UL
IMM GRANULOCYTES NFR BLD: 0 %
LDLC SERPL CALC-MCNC: 33 MG/DL
LYMPHOCYTES # BLD AUTO: 0.7 10E3/UL (ref 0.8–5.3)
LYMPHOCYTES NFR BLD AUTO: 10 %
MCH RBC QN AUTO: 32.7 PG (ref 26.5–33)
MCHC RBC AUTO-ENTMCNC: 33.2 G/DL (ref 31.5–36.5)
MCV RBC AUTO: 98 FL (ref 78–100)
MONOCYTES # BLD AUTO: 0.5 10E3/UL (ref 0–1.3)
MONOCYTES NFR BLD AUTO: 8 %
NEUTROPHILS # BLD AUTO: 5.6 10E3/UL (ref 1.6–8.3)
NEUTROPHILS NFR BLD AUTO: 81 %
NONHDLC SERPL-MCNC: 82 MG/DL
NRBC # BLD AUTO: 0 10E3/UL
NRBC BLD AUTO-RTO: 0 /100
PLAT MORPH BLD: NORMAL
PLATELET # BLD AUTO: 51 10E3/UL (ref 150–450)
POTASSIUM BLD-SCNC: 3.8 MMOL/L (ref 3.4–5.3)
PROT SERPL-MCNC: 6.9 G/DL (ref 6.8–8.8)
RBC # BLD AUTO: 3.76 10E6/UL (ref 4.4–5.9)
RBC MORPH BLD: NORMAL
SODIUM SERPL-SCNC: 139 MMOL/L (ref 133–144)
T4 FREE SERPL-MCNC: 0.97 NG/DL (ref 0.76–1.46)
TRIGL SERPL-MCNC: 246 MG/DL
TSH SERPL DL<=0.005 MIU/L-ACNC: 1.73 MU/L (ref 0.4–4)
WBC # BLD AUTO: 6.9 10E3/UL (ref 4–11)

## 2022-07-06 PROCEDURE — 85025 COMPLETE CBC W/AUTO DIFF WBC: CPT | Performed by: FAMILY MEDICINE

## 2022-07-06 PROCEDURE — 99207 PR FOOT EXAM NO CHARGE: CPT | Performed by: FAMILY MEDICINE

## 2022-07-06 PROCEDURE — 36415 COLL VENOUS BLD VENIPUNCTURE: CPT | Performed by: FAMILY MEDICINE

## 2022-07-06 PROCEDURE — 99214 OFFICE O/P EST MOD 30 MIN: CPT | Performed by: FAMILY MEDICINE

## 2022-07-06 PROCEDURE — 80053 COMPREHEN METABOLIC PANEL: CPT | Performed by: FAMILY MEDICINE

## 2022-07-06 PROCEDURE — 84439 ASSAY OF FREE THYROXINE: CPT | Performed by: FAMILY MEDICINE

## 2022-07-06 PROCEDURE — 84443 ASSAY THYROID STIM HORMONE: CPT | Performed by: FAMILY MEDICINE

## 2022-07-06 PROCEDURE — 83036 HEMOGLOBIN GLYCOSYLATED A1C: CPT | Performed by: FAMILY MEDICINE

## 2022-07-06 PROCEDURE — 80061 LIPID PANEL: CPT | Performed by: FAMILY MEDICINE

## 2022-07-06 RX ORDER — GABAPENTIN 300 MG/1
300 CAPSULE ORAL 3 TIMES DAILY
Qty: 90 CAPSULE | Refills: 3 | Status: ON HOLD | OUTPATIENT
Start: 2022-07-06 | End: 2023-02-23

## 2022-07-06 RX ORDER — LEVOTHYROXINE SODIUM 25 UG/1
25 TABLET ORAL DAILY
Qty: 90 TABLET | Refills: 1 | Status: SHIPPED | OUTPATIENT
Start: 2022-07-06 | End: 2023-01-18

## 2022-07-06 RX ORDER — METOPROLOL SUCCINATE 100 MG/1
100 TABLET, EXTENDED RELEASE ORAL DAILY
Qty: 90 TABLET | Refills: 0 | Status: SHIPPED | OUTPATIENT
Start: 2022-07-06 | End: 2022-09-29

## 2022-07-06 RX ORDER — ATORVASTATIN CALCIUM 10 MG/1
10 TABLET, FILM COATED ORAL DAILY
Qty: 90 TABLET | Refills: 1 | Status: SHIPPED | OUTPATIENT
Start: 2022-07-06 | End: 2023-01-18

## 2022-07-06 RX ORDER — LOSARTAN POTASSIUM 100 MG/1
TABLET ORAL
Qty: 90 TABLET | Refills: 1 | Status: SHIPPED | OUTPATIENT
Start: 2022-07-06 | End: 2022-09-05

## 2022-07-06 RX ORDER — SPIRONOLACTONE 25 MG/1
25 TABLET ORAL DAILY
Qty: 90 TABLET | Refills: 1 | Status: SHIPPED | OUTPATIENT
Start: 2022-07-06 | End: 2023-01-18

## 2022-07-06 ASSESSMENT — PAIN SCALES - GENERAL: PAINLEVEL: NO PAIN (0)

## 2022-07-06 NOTE — PROGRESS NOTES
"       Subjective   Canelo is a 72 year old{ , presenting for the following health issues:  Diabetes      HPI     Diabetes Follow-up      How often are you checking your blood sugar? Not at all    What concerns do you have today about your diabetes? None     Do you have any of these symptoms? (Select all that apply)  Numbness in feet, Burning in feet and Redness, sores, or blisters on feet    Have you had a diabetic eye exam in the last 12 months? Yes- Date of last eye exam: 06/01/2022,  Location: [facility]    BP Readings from Last 2 Encounters:   07/06/22 (!) 158/78   01/19/22 (!) 171/78     Hemoglobin A1C POCT (%)   Date Value   04/05/2021 9.7 (H)   01/12/2021 7.5 (H)     Hemoglobin A1C (%)   Date Value   04/14/2022 7.5 (H)   11/12/2021 6.2 (H)     LDL Cholesterol Calculated (mg/dL)   Date Value   11/12/2021 52   01/12/2021 86   04/20/2020 95                 How many servings of fruits and vegetables do you eat daily?  0-1    On average, how many sweetened beverages do you drink each day (Examples: soda, juice, sweet tea, etc.  Do NOT count diet or artificially sweetened beverages)?   0    How many days per week do you exercise enough to make your heart beat faster?     How many minutes a day do you exercise enough to make your heart beat faster?     How many days per week do you miss taking your medication? 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  Physical Exam  Constitutional:       Appearance: He is well-developed.   HENT:      Head: Normocephalic and atraumatic.   Eyes:      Conjunctiva/sclera: Conjunctivae normal.   Neck:      Vascular: No carotid bruit. "   Cardiovascular:      Rate and Rhythm: Normal rate and regular rhythm.      Heart sounds: Normal heart sounds.   Pulmonary:      Effort: Pulmonary effort is normal. No respiratory distress.      Breath sounds: Normal breath sounds.   Neurological:      Mental Status: He is alert and oriented to person, place, and time.      Cranial Nerves: No cranial nerve deficit.   Psychiatric:         Speech: Speech normal.         Behavior: Behavior normal.         no pretibial edema    Radials okay    Foot exam done; decreased sensation throughout  Faint dorsalis pedis pulses, likely poor circulation  He has faint varicose veins on feet  He has pre-ulcerative calluses present bilaterally  No active ulcers but he did have one in past near the old scar from past foot/ankle surgery    Check labs today, hemoglobin a1c better, now 6.0    ASSESSMENT / PLAN:  (E11.43) Type 2 diabetes mellitus with diabetic autonomic neuropathy, without long-term current use of insulin (H)  (primary encounter diagnosis)  Comment: this note serves as statement from me the certifying physician that patient has diabetes and needs diabetic shoes/ inserts.  Reviewed diabetes meds in detail along with diet/ exercise . He has the foot findings as outlined above.  We will fax this to [facility].  Plan: FOOT EXAM, metFORMIN (GLUCOPHAGE) 500 MG         tablet, Hemoglobin A1c             (E78.5) Hyperlipidemia LDL goal <100  Comment: check labs nonfasting, refill med   Plan: atorvastatin (LIPITOR) 10 MG tablet, Lipid         panel reflex to direct LDL Non-fasting,         Comprehensive metabolic panel             (E03.9) Hypothyroidism, unspecified type  Comment: check labs, refill med but adjust dose if needed   Plan: levothyroxine (SYNTHROID/LEVOTHROID) 25 MCG         tablet, T4 free, TSH             (I10) Hypertension goal BP (blood pressure) < 140/90  Comment: not at goal even on recheck   Plan: losartan (COZAAR) 100 MG tablet,  metoprolol         succinate ER (TOPROL XL) 100 MG 24 hr tablet,         spironolactone (ALDACTONE) 25 MG tablet           (K21.9) Gastroesophageal reflux disease without esophagitis  Comment:  Needs refill   Plan: omeprazole (PRILOSEC) 20 MG DR capsule             (M79.2) Nerve pain  Comment: restart this   Plan: gabapentin (NEURONTIN) 300 MG capsule             (R53.83) Fatigue, unspecified type  Comment: check   Plan: CBC with Platelets & Differential             (R50.9) Fever, unspecified fever cause  Comment: of note a surprise finding today was high temp.  He does not feel sick.   Plan: be seen if symptoms develop.      I reviewed the patient's medications, allergies, medical history, family history, and social history.    Richi Jaramillo MD                         .  ..

## 2022-07-06 NOTE — PATIENT INSTRUCTIONS
Low sodium diet    Keep working on healthy diet/exercise    We will send you lab results    If symptoms of illness get real bad, to emergency room

## 2022-07-06 NOTE — LETTER
July 7, 2022    Canelo Logan  [address] [facility] [address]          Dear ,    We are writing to inform you of your test results.    The platelet count is a bit lower than last time.  We can recheck in a few months.  If you get any unusual bleeding, be seen promptly.     Triglycerides higher than last time.  Keep working on healthy diet/exercise.     Diabetes test ( hemoglobin a1c ) is excellent.     Other labs are stable.       Resulted Orders   TSH   Result Value Ref Range    TSH 1.73 0.40 - 4.00 mU/L   T4 free   Result Value Ref Range    Free T4 0.97 0.76 - 1.46 ng/dL   Hemoglobin A1c   Result Value Ref Range    Hemoglobin A1C 6.0 (H) 0.0 - 5.6 %      Comment:      Normal <5.7%   Prediabetes 5.7-6.4%    Diabetes 6.5% or higher     Note: Adopted from ADA consensus guidelines.   Comprehensive metabolic panel   Result Value Ref Range    Sodium 139 133 - 144 mmol/L    Potassium 3.8 3.4 - 5.3 mmol/L    Chloride 107 94 - 109 mmol/L    Carbon Dioxide (CO2) 25 20 - 32 mmol/L    Anion Gap 7 3 - 14 mmol/L    Urea Nitrogen 24 7 - 30 mg/dL    Creatinine 1.18 0.66 - 1.25 mg/dL    Calcium 8.7 8.5 - 10.1 mg/dL    Glucose 252 (H) 70 - 99 mg/dL    Alkaline Phosphatase 93 40 - 150 U/L    AST 65 (H) 0 - 45 U/L    ALT 91 (H) 0 - 70 U/L    Protein Total 6.9 6.8 - 8.8 g/dL    Albumin 3.2 (L) 3.4 - 5.0 g/dL    Bilirubin Total 1.9 (H) 0.2 - 1.3 mg/dL    GFR Estimate 66 >60 mL/min/1.73m2      Comment:      Effective December 21, 2021 eGFRcr in adults is calculated using the 2021 CKD-EPI creatinine equation which includes age and gender (Hugh et al., NEJ, DOI: 10.1056/YSWYgh0950988)   Lipid panel reflex to direct LDL Non-fasting   Result Value Ref Range    Cholesterol 143 <200 mg/dL    Triglycerides 246 (H) <150 mg/dL    Direct Measure HDL 61 >=40 mg/dL    LDL Cholesterol Calculated 33 <=100 mg/dL    Non HDL Cholesterol 82 <130 mg/dL    Patient Fasting > 8hrs? No     Narrative    Cholesterol  Desirable:  <200  mg/dL    Triglycerides  Normal:  Less than 150 mg/dL  Borderline High:  150-199 mg/dL  High:  200-499 mg/dL  Very High:  Greater than or equal to 500 mg/dL    Direct Measure HDL  Female:  Greater than or equal to 50 mg/dL   Male:  Greater than or equal to 40 mg/dL    LDL Cholesterol  Desirable:  <100mg/dL  Above Desirable:  100-129 mg/dL   Borderline High:  130-159 mg/dL   High:  160-189 mg/dL   Very High:  >= 190 mg/dL    Non HDL Cholesterol  Desirable:  130 mg/dL  Above Desirable:  130-159 mg/dL  Borderline High:  160-189 mg/dL  High:  190-219 mg/dL  Very High:  Greater than or equal to 220 mg/dL   CBC with platelets and differential   Result Value Ref Range    WBC Count 6.9 4.0 - 11.0 10e3/uL    RBC Count 3.76 (L) 4.40 - 5.90 10e6/uL    Hemoglobin 12.3 (L) 13.3 - 17.7 g/dL    Hematocrit 37.0 (L) 40.0 - 53.0 %    MCV 98 78 - 100 fL    MCH 32.7 26.5 - 33.0 pg    MCHC 33.2 31.5 - 36.5 g/dL    RDW 13.4 10.0 - 15.0 %    Platelet Count 51 (L) 150 - 450 10e3/uL    % Neutrophils 81 %    % Lymphocytes 10 %    % Monocytes 8 %    % Eosinophils 1 %    % Basophils 0 %    % Immature Granulocytes 0 %    NRBCs per 100 WBC 0 <1 /100    Absolute Neutrophils 5.6 1.6 - 8.3 10e3/uL    Absolute Lymphocytes 0.7 (L) 0.8 - 5.3 10e3/uL    Absolute Monocytes 0.5 0.0 - 1.3 10e3/uL    Absolute Eosinophils 0.1 0.0 - 0.7 10e3/uL    Absolute Basophils 0.0 0.0 - 0.2 10e3/uL    Absolute Immature Granulocytes 0.0 <=0.4 10e3/uL    Absolute NRBCs 0.0 10e3/uL    Narrative    Tech Comments  Name of [facility]  Laboratory Phone [phone]  What is Abnormal ***  Provider Follow Up Needed ***  If Yes, Provider Contact Name ***  If Yes, Provider Phone/Pager ***       RBC and Platelet Morphology   Result Value Ref Range    Platelet Assessment  Automated Count Confirmed. Platelet morphology is normal.     Automated Count Confirmed. Platelet morphology is normal.    RBC Morphology Confirmed RBC Indices        If you have any questions or concerns, please  call the clinic at the number listed above.       Sincerely,      Richi Jaramillo MD

## 2022-07-07 ENCOUNTER — ANCILLARY PROCEDURE (OUTPATIENT)
Dept: ULTRASOUND IMAGING | Facility: CLINIC | Age: 72
End: 2022-07-07
Attending: INTERNAL MEDICINE
Payer: COMMERCIAL

## 2022-07-07 ENCOUNTER — OFFICE VISIT (OUTPATIENT)
Dept: GASTROENTEROLOGY | Facility: CLINIC | Age: 72
End: 2022-07-07
Attending: INTERNAL MEDICINE
Payer: COMMERCIAL

## 2022-07-07 ENCOUNTER — TELEPHONE (OUTPATIENT)
Dept: GASTROENTEROLOGY | Facility: CLINIC | Age: 72
End: 2022-07-07

## 2022-07-07 ENCOUNTER — MEDICAL CORRESPONDENCE (OUTPATIENT)
Dept: FAMILY MEDICINE | Facility: CLINIC | Age: 72
End: 2022-07-07

## 2022-07-07 ENCOUNTER — LAB (OUTPATIENT)
Dept: LAB | Facility: CLINIC | Age: 72
End: 2022-07-07
Payer: COMMERCIAL

## 2022-07-07 VITALS
WEIGHT: 263.4 LBS | SYSTOLIC BLOOD PRESSURE: 164 MMHG | TEMPERATURE: 97.9 F | DIASTOLIC BLOOD PRESSURE: 76 MMHG | HEART RATE: 59 BPM | OXYGEN SATURATION: 100 % | BODY MASS INDEX: 36.74 KG/M2

## 2022-07-07 DIAGNOSIS — K74.60 CIRRHOSIS OF LIVER WITHOUT ASCITES, UNSPECIFIED HEPATIC CIRRHOSIS TYPE (H): ICD-10-CM

## 2022-07-07 DIAGNOSIS — K74.60 CIRRHOSIS OF LIVER WITHOUT ASCITES, UNSPECIFIED HEPATIC CIRRHOSIS TYPE (H): Primary | ICD-10-CM

## 2022-07-07 LAB
AFP SERPL-MCNC: 2.1 NG/ML
ALBUMIN SERPL-MCNC: 3.3 G/DL (ref 3.4–5)
ALP SERPL-CCNC: 92 U/L (ref 40–150)
ALT SERPL W P-5'-P-CCNC: 82 U/L (ref 0–70)
ANION GAP SERPL CALCULATED.3IONS-SCNC: 6 MMOL/L (ref 3–14)
AST SERPL W P-5'-P-CCNC: 60 U/L (ref 0–45)
BILIRUB DIRECT SERPL-MCNC: 0.5 MG/DL (ref 0–0.2)
BILIRUB SERPL-MCNC: 2 MG/DL (ref 0.2–1.3)
BUN SERPL-MCNC: 18 MG/DL (ref 7–30)
CALCIUM SERPL-MCNC: 8.8 MG/DL (ref 8.5–10.1)
CHLORIDE BLD-SCNC: 110 MMOL/L (ref 94–109)
CO2 SERPL-SCNC: 27 MMOL/L (ref 20–32)
CREAT SERPL-MCNC: 1.06 MG/DL (ref 0.66–1.25)
ERYTHROCYTE [DISTWIDTH] IN BLOOD BY AUTOMATED COUNT: 13.5 % (ref 10–15)
GFR SERPL CREATININE-BSD FRML MDRD: 75 ML/MIN/1.73M2
GLUCOSE BLD-MCNC: 160 MG/DL (ref 70–99)
HCT VFR BLD AUTO: 37.9 % (ref 40–53)
HGB BLD-MCNC: 12.5 G/DL (ref 13.3–17.7)
INR PPP: 1.08 (ref 0.85–1.15)
MCH RBC QN AUTO: 32.7 PG (ref 26.5–33)
MCHC RBC AUTO-ENTMCNC: 33 G/DL (ref 31.5–36.5)
MCV RBC AUTO: 99 FL (ref 78–100)
PLAT MORPH BLD: NORMAL
PLATELET # BLD AUTO: 48 10E3/UL (ref 150–450)
POTASSIUM BLD-SCNC: 4.2 MMOL/L (ref 3.4–5.3)
PROT SERPL-MCNC: 7 G/DL (ref 6.8–8.8)
RBC # BLD AUTO: 3.82 10E6/UL (ref 4.4–5.9)
RBC MORPH BLD: NORMAL
SODIUM SERPL-SCNC: 143 MMOL/L (ref 133–144)
WBC # BLD AUTO: 4.5 10E3/UL (ref 4–11)

## 2022-07-07 PROCEDURE — 82248 BILIRUBIN DIRECT: CPT | Performed by: PATHOLOGY

## 2022-07-07 PROCEDURE — 99000 SPECIMEN HANDLING OFFICE-LAB: CPT | Performed by: PATHOLOGY

## 2022-07-07 PROCEDURE — 85610 PROTHROMBIN TIME: CPT | Performed by: PATHOLOGY

## 2022-07-07 PROCEDURE — G0463 HOSPITAL OUTPT CLINIC VISIT: HCPCS

## 2022-07-07 PROCEDURE — 80053 COMPREHEN METABOLIC PANEL: CPT | Performed by: PATHOLOGY

## 2022-07-07 PROCEDURE — 99214 OFFICE O/P EST MOD 30 MIN: CPT | Performed by: INTERNAL MEDICINE

## 2022-07-07 PROCEDURE — 82105 ALPHA-FETOPROTEIN SERUM: CPT | Mod: 90 | Performed by: PATHOLOGY

## 2022-07-07 PROCEDURE — 36415 COLL VENOUS BLD VENIPUNCTURE: CPT | Performed by: PATHOLOGY

## 2022-07-07 PROCEDURE — 76700 US EXAM ABDOM COMPLETE: CPT | Mod: GC | Performed by: RADIOLOGY

## 2022-07-07 PROCEDURE — 85027 COMPLETE CBC AUTOMATED: CPT | Performed by: PATHOLOGY

## 2022-07-07 ASSESSMENT — PAIN SCALES - GENERAL: PAINLEVEL: NO PAIN (0)

## 2022-07-07 NOTE — LETTER
7/7/2022         RE: Gucci Logan  [address] [facility] [address]        Dear Colleague,    Thank you for referring your patient, Gucci Logan, to the [facility] [place]. Please see a copy of my visit note below.    HISTORY OF PRESENT ILLNESS:  I had the pleasure of seeing Canelo Logan for followup in the Liver Clinic at the [facility] on 07/07/2022.  Mr. 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    For the most part, he is doing okay.  He denies any abdominal pain, itching or skin rash.  He does have some fatigue.  He is very active and is currently building a pole barn on his property that he acquired a couple years ago.  He denies any increased abdominal girth or lower extremity edema.  He does complain of peripheral neuropathy in his feet.  He has not had any gastrointestinal bleeding or any overt signs of hepatic encephalopathy.    He denies any fevers or chills, cough or shortness of breath.  He denies any nausea or vomiting, diarrhea or constipation.  His appetite has been good.  His weight is unchanged.    He has had 3 doses of the COVID-19 vaccine.  He is planning on getting another in the next week from his local pharmacy.    Current Outpatient Medications   Medication     aspirin (ASA) 81 MG chewable tablet     atorvastatin (LIPITOR) 10 MG tablet     azithromycin (ZITHROMAX) 250 MG tablet     blood glucose (NO BRAND SPECIFIED) lancets standard     blood glucose (NO BRAND SPECIFIED) test strip     blood glucose monitoring (NO BRAND SPECIFIED) meter device kit     cholecalciferol 25 MCG (1000 UT) TABS     gabapentin (NEURONTIN) 300 MG capsule     hydrochlorothiazide (HYDRODIURIL) 50 MG tablet     HYDROcodone-acetaminophen (NORCO) 5-325 MG tablet     levothyroxine (SYNTHROID/LEVOTHROID) 25 MCG tablet     losartan (COZAAR) 100 MG  tablet     metFORMIN (GLUCOPHAGE) 500 MG tablet     methylPREDNISolone (MEDROL DOSEPAK) 4 MG tablet therapy pack     metoprolol succinate ER (TOPROL XL) 100 MG 24 hr tablet     multivitamin w/minerals (THERA-VIT-M) tablet     omeprazole (PRILOSEC) 20 MG DR capsule     spironolactone (ALDACTONE) 25 MG tablet     traMADol (ULTRAM) 50 MG tablet     No current facility-administered medications for this visit.     BP (!) 164/76   Pulse 59   Temp 97.9  F (36.6  C) (Oral)   Wt 119.5 kg (263 lb 6.4 oz)   SpO2 100%   BMI 36.74 kg/m      PHYSICAL EXAMINATION:    GENERAL:  He looks well.  HEENT:  Exam shows no scleral icterus or temporal muscle wasting.  CHEST:  Clear.  ABDOMEN:  Exam shows no increase in girth.  No masses or tenderness to palpation are present.  His liver is 10-11 cm span with a slightly prominent left lobe.  No spleen tip is palpable.  EXTREMITIES:  Exam shows no edema.  SKIN:  Exam shows no stigmata of chronic liver disease.  NEUROLOGIC:  Exam shows no asterixis.    Recent Results (from the past 168 hour(s))   TSH    Collection Time: 07/06/22  3:25 PM   Result Value Ref Range    TSH 1.73 0.40 - 4.00 mU/L   T4 free    Collection Time: 07/06/22  3:25 PM   Result Value Ref Range    Free T4 0.97 0.76 - 1.46 ng/dL   Hemoglobin A1c    Collection Time: 07/06/22  3:25 PM   Result Value Ref Range    Hemoglobin A1C 6.0 (H) 0.0 - 5.6 %   Comprehensive metabolic panel    Collection Time: 07/06/22  3:25 PM   Result Value Ref Range    Sodium 139 133 - 144 mmol/L    Potassium 3.8 3.4 - 5.3 mmol/L    Chloride 107 94 - 109 mmol/L    Carbon Dioxide (CO2) 25 20 - 32 mmol/L    Anion Gap 7 3 - 14 mmol/L    Urea Nitrogen 24 7 - 30 mg/dL    Creatinine 1.18 0.66 - 1.25 mg/dL    Calcium 8.7 8.5 - 10.1 mg/dL    Glucose 252 (H) 70 - 99 mg/dL    Alkaline Phosphatase 93 40 - 150 U/L    AST 65 (H) 0 - 45 U/L    ALT 91 (H) 0 - 70 U/L    Protein Total 6.9 6.8 - 8.8 g/dL    Albumin 3.2 (L) 3.4 - 5.0 g/dL    Bilirubin Total 1.9 (H) 0.2  - 1.3 mg/dL    GFR Estimate 66 >60 mL/min/1.73m2   Lipid panel reflex to direct LDL Non-fasting    Collection Time: 07/06/22  3:25 PM   Result Value Ref Range    Cholesterol 143 <200 mg/dL    Triglycerides 246 (H) <150 mg/dL    Direct Measure HDL 61 >=40 mg/dL    LDL Cholesterol Calculated 33 <=100 mg/dL    Non HDL Cholesterol 82 <130 mg/dL    Patient Fasting > 8hrs? No    CBC with platelets and differential    Collection Time: 07/06/22  3:25 PM   Result Value Ref Range    WBC Count 6.9 4.0 - 11.0 10e3/uL    RBC Count 3.76 (L) 4.40 - 5.90 10e6/uL    Hemoglobin 12.3 (L) 13.3 - 17.7 g/dL    Hematocrit 37.0 (L) 40.0 - 53.0 %    MCV 98 78 - 100 fL    MCH 32.7 26.5 - 33.0 pg    MCHC 33.2 31.5 - 36.5 g/dL    RDW 13.4 10.0 - 15.0 %    Platelet Count 51 (L) 150 - 450 10e3/uL    % Neutrophils 81 %    % Lymphocytes 10 %    % Monocytes 8 %    % Eosinophils 1 %    % Basophils 0 %    % Immature Granulocytes 0 %    NRBCs per 100 WBC 0 <1 /100    Absolute Neutrophils 5.6 1.6 - 8.3 10e3/uL    Absolute Lymphocytes 0.7 (L) 0.8 - 5.3 10e3/uL    Absolute Monocytes 0.5 0.0 - 1.3 10e3/uL    Absolute Eosinophils 0.1 0.0 - 0.7 10e3/uL    Absolute Basophils 0.0 0.0 - 0.2 10e3/uL    Absolute Immature Granulocytes 0.0 <=0.4 10e3/uL    Absolute NRBCs 0.0 10e3/uL   RBC and Platelet Morphology    Collection Time: 07/06/22  3:25 PM   Result Value Ref Range    Platelet Assessment  Automated Count Confirmed. Platelet morphology is normal.     Automated Count Confirmed. Platelet morphology is normal.    RBC Morphology Confirmed RBC Indices    Hepatic Panel [LAB20]    Collection Time: 07/07/22  8:11 AM   Result Value Ref Range    Bilirubin Total 2.0 (H) 0.2 - 1.3 mg/dL    Bilirubin Direct 0.5 (H) 0.0 - 0.2 mg/dL    Protein Total 7.0 6.8 - 8.8 g/dL    Albumin 3.3 (L) 3.4 - 5.0 g/dL    Alkaline Phosphatase 92 40 - 150 U/L    AST 60 (H) 0 - 45 U/L    ALT 82 (H) 0 - 70 U/L   Basic metabolic panel [LAB15]    Collection Time: 07/07/22  8:11 AM   Result  Value Ref Range    Sodium 143 133 - 144 mmol/L    Potassium 4.2 3.4 - 5.3 mmol/L    Chloride 110 (H) 94 - 109 mmol/L    Carbon Dioxide (CO2) 27 20 - 32 mmol/L    Anion Gap 6 3 - 14 mmol/L    Urea Nitrogen 18 7 - 30 mg/dL    Creatinine 1.06 0.66 - 1.25 mg/dL    Calcium 8.8 8.5 - 10.1 mg/dL    Glucose 160 (H) 70 - 99 mg/dL    GFR Estimate 75 >60 mL/min/1.73m2   CBC with platelets [[id number]]    Collection Time: 07/07/22  8:11 AM   Result Value Ref Range    WBC Count 4.5 4.0 - 11.0 10e3/uL    RBC Count 3.82 (L) 4.40 - 5.90 10e6/uL    Hemoglobin 12.5 (L) 13.3 - 17.7 g/dL    Hematocrit 37.9 (L) 40.0 - 53.0 %    MCV 99 78 - 100 fL    MCH 32.7 26.5 - 33.0 pg    MCHC 33.0 31.5 - 36.5 g/dL    RDW 13.5 10.0 - 15.0 %    Platelet Count 48 (LL) 150 - 450 10e3/uL   INR [[id number]]    Collection Time: 07/07/22  8:11 AM   Result Value Ref Range    INR 1.08 0.85 - 1.15   RBC and Platelet Morphology    Collection Time: 07/07/22  8:11 AM   Result Value Ref Range    Platelet Assessment  Automated Count Confirmed. Platelet morphology is normal.     Automated Count Confirmed. Platelet morphology is normal.    RBC Morphology Confirmed RBC Indices      Exam: US ABDOMEN COMPLETE, 7/7/2022 3:07 PM     Indication: Patient at high risk for HCC. Cirrhosis of liver without ascites, unspecified hepatic cirrhosis type (H)     Comparison: Abdominal ultrasound 8/9/2021, 1/28/2021, abdominal MRI 2/7/2021.     Technique: The abdomen was scanned in the standard fashion with specialized ultrasound transducer(s) using both gray scale and limited color/spectral Doppler techniques.     Findings:     Liver:     The liver demonstrates coarsened hepatic echotexture with nodular contour. Unchanged echogenic lesion in the left hepatic lobe measuring 0.9 x 0.7 x 0.7 cm. No intrahepatic biliary ductal dilatation. The main portal vein is patent with antegrade flow.     US visualization score: A - No or minimal limitations     Gallbladder: Multiple layering mobile  echogenic and shadowing gallstones. No gallbladder wall thickening, gallbladder wall hyperemia, or pericholecystic fluid. Sonographic Alcantara sign is negative.     Bile Ducts: Both the intra- and extrahepatic biliary system are of normal caliber. The common bile duct measures 5 mm in diameter.     Pancreas: Visualized portions of the head and body of the pancreas are  unremarkable.      Kidneys: Both kidneys are of normal echotexture without hydronephrosis.  The craniocaudal dimensions are: right- 13.1 cm, left- 13.5 cm. Ovoid anechoic cyst in the inferior pole of the right kidney measuring up to 1.4 cm. Ovoid anechoic exophytic cyst in the interpolar region of the left kidney measuring up to 5.1 cm.     Spleen: The spleen is enlarged in size, measuring 18.3 cm in sagittal dimension.     Aorta and IVC: The visualized portions of the aorta and IVC are unremarkable. The aorta measures 2.6 cm in diameter and the IVC measures 2.4 cm in diameter.     Fluid: No evidence of ascites or pleural effusions.                                                                 Impression:  1. Cirrhosis with unchanged echogenic lesion in the left hepatic lobe, which was not confidently identified on the prior MRI. Continued attention on follow up.  a. LI-RADS US Category: US-1 Negative: No US evidence of HCC  b. Recommend continued surveillance US.  2. Cholelithiasis without evidence for acute cholecystitis.  3. Splenomegaly.     IMPRESSION:  My impression is that Mr. 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  He is doing about as well as one would expect.  His liver function is excellent.  His disease is well compensated.  As mentioned, he does need a fourth dose of the COVID-19 vaccine and will then be up to date on his vaccines.  He is going to be due for variceal screening, which I will schedule at his next visit.  I otherwise will not be making any other change to  his medical regimen at this point in time.  I will see him back in the clinic in 6 months for repeat imaging and blood work.    Thank you very much for allowing me to participate in the care of this patient.  If you have any questions regarding my recommendations, please do not hesitate to contact me.         Mata Renteria MD      Professor of Medicine  [employer]      Executive Medical Director, Solid Organ Transplant Program  [facility]

## 2022-07-07 NOTE — PROGRESS NOTES
HISTORY OF PRESENT ILLNESS:  I had the pleasure of seeing Canelo Logan for followup in the Liver Clinic at the [facility] on 07/07/2022.  Mr. 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    For the most part, he is doing okay.  He denies any abdominal pain, itching or skin rash.  He does have some fatigue.  He is very active and is currently building a pole barn on his property that he acquired a couple years ago.  He denies any increased abdominal girth or lower extremity edema.  He does complain of peripheral neuropathy in his feet.  He has not had any gastrointestinal bleeding or any overt signs of hepatic encephalopathy.    He denies any fevers or chills, cough or shortness of breath.  He denies any nausea or vomiting, diarrhea or constipation.  His appetite has been good.  His weight is unchanged.    He has had 3 doses of the COVID-19 vaccine.  He is planning on getting another in the next week from his local pharmacy.    Current Outpatient Medications   Medication     aspirin (ASA) 81 MG chewable tablet     atorvastatin (LIPITOR) 10 MG tablet     azithromycin (ZITHROMAX) 250 MG tablet     blood glucose (NO BRAND SPECIFIED) lancets standard     blood glucose (NO BRAND SPECIFIED) test strip     blood glucose monitoring (NO BRAND SPECIFIED) meter device kit     cholecalciferol 25 MCG (1000 UT) TABS     gabapentin (NEURONTIN) 300 MG capsule     hydrochlorothiazide (HYDRODIURIL) 50 MG tablet     HYDROcodone-acetaminophen (NORCO) 5-325 MG tablet     levothyroxine (SYNTHROID/LEVOTHROID) 25 MCG tablet     losartan (COZAAR) 100 MG tablet     metFORMIN (GLUCOPHAGE) 500 MG tablet     methylPREDNISolone (MEDROL DOSEPAK) 4 MG tablet therapy pack     metoprolol succinate ER (TOPROL XL) 100 MG 24 hr tablet     multivitamin w/minerals (THERA-VIT-M) tablet     omeprazole (PRILOSEC) 20 MG DR capsule      spironolactone (ALDACTONE) 25 MG tablet     traMADol (ULTRAM) 50 MG tablet     No current facility-administered medications for this visit.     BP (!) 164/76   Pulse 59   Temp 97.9  F (36.6  C) (Oral)   Wt 119.5 kg (263 lb 6.4 oz)   SpO2 100%   BMI 36.74 kg/m      PHYSICAL EXAMINATION:    GENERAL:  He looks well.  HEENT:  Exam shows no scleral icterus or temporal muscle wasting.  CHEST:  Clear.  ABDOMEN:  Exam shows no increase in girth.  No masses or tenderness to palpation are present.  His liver is 10-11 cm span with a slightly prominent left lobe.  No spleen tip is palpable.  EXTREMITIES:  Exam shows no edema.  SKIN:  Exam shows no stigmata of chronic liver disease.  NEUROLOGIC:  Exam shows no asterixis.    Recent Results (from the past 168 hour(s))   TSH    Collection Time: 07/06/22  3:25 PM   Result Value Ref Range    TSH 1.73 0.40 - 4.00 mU/L   T4 free    Collection Time: 07/06/22  3:25 PM   Result Value Ref Range    Free T4 0.97 0.76 - 1.46 ng/dL   Hemoglobin A1c    Collection Time: 07/06/22  3:25 PM   Result Value Ref Range    Hemoglobin A1C 6.0 (H) 0.0 - 5.6 %   Comprehensive metabolic panel    Collection Time: 07/06/22  3:25 PM   Result Value Ref Range    Sodium 139 133 - 144 mmol/L    Potassium 3.8 3.4 - 5.3 mmol/L    Chloride 107 94 - 109 mmol/L    Carbon Dioxide (CO2) 25 20 - 32 mmol/L    Anion Gap 7 3 - 14 mmol/L    Urea Nitrogen 24 7 - 30 mg/dL    Creatinine 1.18 0.66 - 1.25 mg/dL    Calcium 8.7 8.5 - 10.1 mg/dL    Glucose 252 (H) 70 - 99 mg/dL    Alkaline Phosphatase 93 40 - 150 U/L    AST 65 (H) 0 - 45 U/L    ALT 91 (H) 0 - 70 U/L    Protein Total 6.9 6.8 - 8.8 g/dL    Albumin 3.2 (L) 3.4 - 5.0 g/dL    Bilirubin Total 1.9 (H) 0.2 - 1.3 mg/dL    GFR Estimate 66 >60 mL/min/1.73m2   Lipid panel reflex to direct LDL Non-fasting    Collection Time: 07/06/22  3:25 PM   Result Value Ref Range    Cholesterol 143 <200 mg/dL    Triglycerides 246 (H) <150 mg/dL    Direct Measure HDL 61 >=40 mg/dL    LDL  Cholesterol Calculated 33 <=100 mg/dL    Non HDL Cholesterol 82 <130 mg/dL    Patient Fasting > 8hrs? No    CBC with platelets and differential    Collection Time: 07/06/22  3:25 PM   Result Value Ref Range    WBC Count 6.9 4.0 - 11.0 10e3/uL    RBC Count 3.76 (L) 4.40 - 5.90 10e6/uL    Hemoglobin 12.3 (L) 13.3 - 17.7 g/dL    Hematocrit 37.0 (L) 40.0 - 53.0 %    MCV 98 78 - 100 fL    MCH 32.7 26.5 - 33.0 pg    MCHC 33.2 31.5 - 36.5 g/dL    RDW 13.4 10.0 - 15.0 %    Platelet Count 51 (L) 150 - 450 10e3/uL    % Neutrophils 81 %    % Lymphocytes 10 %    % Monocytes 8 %    % Eosinophils 1 %    % Basophils 0 %    % Immature Granulocytes 0 %    NRBCs per 100 WBC 0 <1 /100    Absolute Neutrophils 5.6 1.6 - 8.3 10e3/uL    Absolute Lymphocytes 0.7 (L) 0.8 - 5.3 10e3/uL    Absolute Monocytes 0.5 0.0 - 1.3 10e3/uL    Absolute Eosinophils 0.1 0.0 - 0.7 10e3/uL    Absolute Basophils 0.0 0.0 - 0.2 10e3/uL    Absolute Immature Granulocytes 0.0 <=0.4 10e3/uL    Absolute NRBCs 0.0 10e3/uL   RBC and Platelet Morphology    Collection Time: 07/06/22  3:25 PM   Result Value Ref Range    Platelet Assessment  Automated Count Confirmed. Platelet morphology is normal.     Automated Count Confirmed. Platelet morphology is normal.    RBC Morphology Confirmed RBC Indices    Hepatic Panel [LAB20]    Collection Time: 07/07/22  8:11 AM   Result Value Ref Range    Bilirubin Total 2.0 (H) 0.2 - 1.3 mg/dL    Bilirubin Direct 0.5 (H) 0.0 - 0.2 mg/dL    Protein Total 7.0 6.8 - 8.8 g/dL    Albumin 3.3 (L) 3.4 - 5.0 g/dL    Alkaline Phosphatase 92 40 - 150 U/L    AST 60 (H) 0 - 45 U/L    ALT 82 (H) 0 - 70 U/L   Basic metabolic panel [LAB15]    Collection Time: 07/07/22  8:11 AM   Result Value Ref Range    Sodium 143 133 - 144 mmol/L    Potassium 4.2 3.4 - 5.3 mmol/L    Chloride 110 (H) 94 - 109 mmol/L    Carbon Dioxide (CO2) 27 20 - 32 mmol/L    Anion Gap 6 3 - 14 mmol/L    Urea Nitrogen 18 7 - 30 mg/dL    Creatinine 1.06 0.66 - 1.25 mg/dL    Calcium  8.8 8.5 - 10.1 mg/dL    Glucose 160 (H) 70 - 99 mg/dL    GFR Estimate 75 >60 mL/min/1.73m2   CBC with platelets [JOE208]    Collection Time: 07/07/22  8:11 AM   Result Value Ref Range    WBC Count 4.5 4.0 - 11.0 10e3/uL    RBC Count 3.82 (L) 4.40 - 5.90 10e6/uL    Hemoglobin 12.5 (L) 13.3 - 17.7 g/dL    Hematocrit 37.9 (L) 40.0 - 53.0 %    MCV 99 78 - 100 fL    MCH 32.7 26.5 - 33.0 pg    MCHC 33.0 31.5 - 36.5 g/dL    RDW 13.5 10.0 - 15.0 %    Platelet Count 48 (LL) 150 - 450 10e3/uL   INR [INZ8043]    Collection Time: 07/07/22  8:11 AM   Result Value Ref Range    INR 1.08 0.85 - 1.15   RBC and Platelet Morphology    Collection Time: 07/07/22  8:11 AM   Result Value Ref Range    Platelet Assessment  Automated Count Confirmed. Platelet morphology is normal.     Automated Count Confirmed. Platelet morphology is normal.    RBC Morphology Confirmed RBC Indices      Exam: US ABDOMEN COMPLETE, 7/7/2022 3:07 PM     Indication: Patient at high risk for HCC. Cirrhosis of liver without ascites, unspecified hepatic cirrhosis type (H)     Comparison: Abdominal ultrasound 8/9/2021, 1/28/2021, abdominal MRI 2/7/2021.     Technique: The abdomen was scanned in the standard fashion with specialized ultrasound transducer(s) using both gray scale and limited color/spectral Doppler techniques.     Findings:     Liver:     The liver demonstrates coarsened hepatic echotexture with nodular contour. Unchanged echogenic lesion in the left hepatic lobe measuring 0.9 x 0.7 x 0.7 cm. No intrahepatic biliary ductal dilatation. The main portal vein is patent with antegrade flow.     US visualization score: A - No or minimal limitations     Gallbladder: Multiple layering mobile echogenic and shadowing gallstones. No gallbladder wall thickening, gallbladder wall hyperemia, or pericholecystic fluid. Sonographic Alcantara sign is negative.     Bile Ducts: Both the intra- and extrahepatic biliary system are of normal caliber. The common bile duct  measures 5 mm in diameter.     Pancreas: Visualized portions of the head and body of the pancreas are  unremarkable.      Kidneys: Both kidneys are of normal echotexture without hydronephrosis.  The craniocaudal dimensions are: right- 13.1 cm, left- 13.5 cm. Ovoid anechoic cyst in the inferior pole of the right kidney measuring up to 1.4 cm. Ovoid anechoic exophytic cyst in the interpolar region of the left kidney measuring up to 5.1 cm.     Spleen: The spleen is enlarged in size, measuring 18.3 cm in sagittal dimension.     Aorta and IVC: The visualized portions of the aorta and IVC are unremarkable. The aorta measures 2.6 cm in diameter and the IVC measures 2.4 cm in diameter.     Fluid: No evidence of ascites or pleural effusions.                                                                 Impression:  1. Cirrhosis with unchanged echogenic lesion in the left hepatic lobe, which was not confidently identified on the prior MRI. Continued attention on follow up.  a. LI-RADS US Category: US-1 Negative: No US evidence of HCC  b. Recommend continued surveillance US.  2. Cholelithiasis without evidence for acute cholecystitis.  3. Splenomegaly.     IMPRESSION:  My impression is that Mr. 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  He is doing about as well as one would expect.  His liver function is excellent.  His disease is well compensated.  As mentioned, he does need a fourth dose of the COVID-19 vaccine and will then be up to date on his vaccines.  He is going to be due for variceal screening, which I will schedule at his next visit.  I otherwise will not be making any other change to his medical regimen at this point in time.  I will see him back in the clinic in 6 months for repeat imaging and blood work.    Thank you very much for allowing me to participate in the care of this patient.  If you have any questions regarding my recommendations,  please do not hesitate to contact me.         Mata Renteria MD      Professor of Medicine  [employer]      Executive Medical Director, Solid Organ Transplant Program  [facility]

## 2022-07-07 NOTE — TELEPHONE ENCOUNTER
DATE:  7/7/2022   TIME OF RECEIPT FROM LAB:  8:57 am  LAB TEST:  Platelets  LAB VALUE:  48  RESULTS GIVEN WITH READ-BACK TO (PROVIDER):  Dr. Mata Renteria    TIME LAB VALUE REPORTED TO PROVIDER:   8:58. Lab results consistent with previous results. Encounter routed to provider.

## 2022-07-07 NOTE — RESULT ENCOUNTER NOTE
The platelet count is a bit lower than last time.  We can recheck in a few months.  If you get any unusual bleeding, be seen promptly.    Triglycerides higher than last time.  Keep working on healthy diet/exercise.    Diabetes test ( hemoglobin a1c ) is excellent.    Other labs are stable.    Richi Jaramillo MD

## 2022-07-07 NOTE — NURSING NOTE
Chief Complaint   Patient presents with     RECHECK     6 mo f/u       BP (!) 164/76   Pulse 59   Temp 97.9  F (36.6  C) (Oral)   Wt 119.5 kg (263 lb 6.4 oz)   SpO2 100%   BMI 36.74 kg/m      Eris Balderas on 7/7/2022 at 9:41 AM

## 2022-07-14 ENCOUNTER — TELEPHONE (OUTPATIENT)
Dept: FAMILY MEDICINE | Facility: CLINIC | Age: 72
End: 2022-07-14

## 2022-07-14 NOTE — TELEPHONE ENCOUNTER
Faxed [facility] orthotic and prosthetics form to [phone]. Put in [employer] micah aguilar.    Karie CHAN MA

## 2022-08-05 NOTE — PROGRESS NOTES
- BP controlled. Continue current management  Hypertension Documentation:  Hypertension was addressed today.  Relevant social needs were addressed today.  Actions taken today:  Discussed medication adherence or lifestyle factors.   Your pre-op labs are stable    Richi Delgado - please fax to [facility] preop  thanks  Richi Jaramillo MD

## 2022-08-15 ENCOUNTER — OFFICE VISIT (OUTPATIENT)
Dept: FAMILY MEDICINE | Facility: CLINIC | Age: 72
End: 2022-08-15
Payer: COMMERCIAL

## 2022-08-15 VITALS
TEMPERATURE: 97.9 F | WEIGHT: 263.5 LBS | DIASTOLIC BLOOD PRESSURE: 72 MMHG | SYSTOLIC BLOOD PRESSURE: 150 MMHG | BODY MASS INDEX: 36.75 KG/M2 | HEART RATE: 61 BPM

## 2022-08-15 DIAGNOSIS — G89.4 CHRONIC PAIN SYNDROME: ICD-10-CM

## 2022-08-15 DIAGNOSIS — M25.50 MULTIPLE JOINT PAIN: ICD-10-CM

## 2022-08-15 DIAGNOSIS — M25.512 LEFT SHOULDER PAIN, UNSPECIFIED CHRONICITY: ICD-10-CM

## 2022-08-15 DIAGNOSIS — M25.561 RIGHT KNEE PAIN, UNSPECIFIED CHRONICITY: ICD-10-CM

## 2022-08-15 DIAGNOSIS — Z12.11 SCREEN FOR COLON CANCER: ICD-10-CM

## 2022-08-15 DIAGNOSIS — Z12.5 SCREENING FOR PROSTATE CANCER: ICD-10-CM

## 2022-08-15 DIAGNOSIS — Z79.899 ENCOUNTER FOR LONG-TERM (CURRENT) USE OF MEDICATIONS: Primary | ICD-10-CM

## 2022-08-15 DIAGNOSIS — N40.1 BENIGN PROSTATIC HYPERPLASIA WITH LOWER URINARY TRACT SYMPTOMS, SYMPTOM DETAILS UNSPECIFIED: ICD-10-CM

## 2022-08-15 DIAGNOSIS — E78.5 HYPERLIPIDEMIA LDL GOAL <100: ICD-10-CM

## 2022-08-15 DIAGNOSIS — I10 HYPERTENSION GOAL BP (BLOOD PRESSURE) < 140/90: ICD-10-CM

## 2022-08-15 DIAGNOSIS — E11.43 TYPE 2 DIABETES MELLITUS WITH DIABETIC AUTONOMIC NEUROPATHY, WITHOUT LONG-TERM CURRENT USE OF INSULIN (H): ICD-10-CM

## 2022-08-15 DIAGNOSIS — R53.83 FATIGUE, UNSPECIFIED TYPE: ICD-10-CM

## 2022-08-15 LAB
ALBUMIN SERPL-MCNC: 3.5 G/DL (ref 3.4–5)
ALP SERPL-CCNC: 111 U/L (ref 40–150)
ALT SERPL W P-5'-P-CCNC: 61 U/L (ref 0–70)
ANION GAP SERPL CALCULATED.3IONS-SCNC: 8 MMOL/L (ref 3–14)
AST SERPL W P-5'-P-CCNC: 51 U/L (ref 0–45)
BASOPHILS # BLD AUTO: 0 10E3/UL (ref 0–0.2)
BASOPHILS NFR BLD AUTO: 0 %
BILIRUB SERPL-MCNC: 1.1 MG/DL (ref 0.2–1.3)
BUN SERPL-MCNC: 28 MG/DL (ref 7–30)
CALCIUM SERPL-MCNC: 9.1 MG/DL (ref 8.5–10.1)
CHLORIDE BLD-SCNC: 113 MMOL/L (ref 94–109)
CHOLEST SERPL-MCNC: 151 MG/DL
CO2 SERPL-SCNC: 24 MMOL/L (ref 20–32)
CREAT SERPL-MCNC: 1.16 MG/DL (ref 0.66–1.25)
CREAT UR-MCNC: 173 MG/DL
CREAT UR-MCNC: 174 MG/DL
CRP SERPL-MCNC: 7.9 MG/L (ref 0–8)
EOSINOPHIL # BLD AUTO: 0.1 10E3/UL (ref 0–0.7)
EOSINOPHIL NFR BLD AUTO: 2 %
ERYTHROCYTE [DISTWIDTH] IN BLOOD BY AUTOMATED COUNT: 13 % (ref 10–15)
ERYTHROCYTE [SEDIMENTATION RATE] IN BLOOD BY WESTERGREN METHOD: 21 MM/HR (ref 0–20)
FASTING STATUS PATIENT QL REPORTED: NO
GFR SERPL CREATININE-BSD FRML MDRD: 67 ML/MIN/1.73M2
GLUCOSE BLD-MCNC: 159 MG/DL (ref 70–99)
HBA1C MFR BLD: 7.3 % (ref 0–5.6)
HCT VFR BLD AUTO: 39.7 % (ref 40–53)
HDLC SERPL-MCNC: 46 MG/DL
HGB BLD-MCNC: 13 G/DL (ref 13.3–17.7)
IMM GRANULOCYTES # BLD: 0 10E3/UL
IMM GRANULOCYTES NFR BLD: 0 %
LDLC SERPL CALC-MCNC: 77 MG/DL
LYMPHOCYTES # BLD AUTO: 0.6 10E3/UL (ref 0.8–5.3)
LYMPHOCYTES NFR BLD AUTO: 16 %
MCH RBC QN AUTO: 31.9 PG (ref 26.5–33)
MCHC RBC AUTO-ENTMCNC: 32.7 G/DL (ref 31.5–36.5)
MCV RBC AUTO: 98 FL (ref 78–100)
MICROALBUMIN UR-MCNC: 109 MG/L
MICROALBUMIN/CREAT UR: 62.64 MG/G CR (ref 0–17)
MONOCYTES # BLD AUTO: 0.4 10E3/UL (ref 0–1.3)
MONOCYTES NFR BLD AUTO: 10 %
NEUTROPHILS # BLD AUTO: 2.6 10E3/UL (ref 1.6–8.3)
NEUTROPHILS NFR BLD AUTO: 72 %
NONHDLC SERPL-MCNC: 105 MG/DL
NRBC # BLD AUTO: 0 10E3/UL
NRBC BLD AUTO-RTO: 0 /100
PLATELET # BLD AUTO: 63 10E3/UL (ref 150–450)
POTASSIUM BLD-SCNC: 4.2 MMOL/L (ref 3.4–5.3)
PROT SERPL-MCNC: 7.3 G/DL (ref 6.8–8.8)
PSA SERPL-MCNC: 1.66 UG/L (ref 0–4)
RBC # BLD AUTO: 4.07 10E6/UL (ref 4.4–5.9)
SODIUM SERPL-SCNC: 145 MMOL/L (ref 133–144)
TRIGL SERPL-MCNC: 140 MG/DL
TSH SERPL DL<=0.005 MIU/L-ACNC: 3.47 MU/L (ref 0.4–4)
WBC # BLD AUTO: 3.7 10E3/UL (ref 4–11)

## 2022-08-15 PROCEDURE — 86140 C-REACTIVE PROTEIN: CPT | Performed by: FAMILY MEDICINE

## 2022-08-15 PROCEDURE — 99214 OFFICE O/P EST MOD 30 MIN: CPT | Performed by: FAMILY MEDICINE

## 2022-08-15 PROCEDURE — 36415 COLL VENOUS BLD VENIPUNCTURE: CPT | Performed by: FAMILY MEDICINE

## 2022-08-15 PROCEDURE — 80307 DRUG TEST PRSMV CHEM ANLYZR: CPT | Performed by: FAMILY MEDICINE

## 2022-08-15 PROCEDURE — 86431 RHEUMATOID FACTOR QUANT: CPT | Performed by: FAMILY MEDICINE

## 2022-08-15 PROCEDURE — 86038 ANTINUCLEAR ANTIBODIES: CPT | Performed by: FAMILY MEDICINE

## 2022-08-15 PROCEDURE — 82043 UR ALBUMIN QUANTITATIVE: CPT | Performed by: FAMILY MEDICINE

## 2022-08-15 PROCEDURE — 85025 COMPLETE CBC W/AUTO DIFF WBC: CPT | Performed by: FAMILY MEDICINE

## 2022-08-15 PROCEDURE — G0103 PSA SCREENING: HCPCS | Performed by: FAMILY MEDICINE

## 2022-08-15 PROCEDURE — 83036 HEMOGLOBIN GLYCOSYLATED A1C: CPT | Performed by: FAMILY MEDICINE

## 2022-08-15 PROCEDURE — 84443 ASSAY THYROID STIM HORMONE: CPT | Performed by: FAMILY MEDICINE

## 2022-08-15 PROCEDURE — 80053 COMPREHEN METABOLIC PANEL: CPT | Performed by: FAMILY MEDICINE

## 2022-08-15 PROCEDURE — 85652 RBC SED RATE AUTOMATED: CPT | Performed by: FAMILY MEDICINE

## 2022-08-15 PROCEDURE — 80061 LIPID PANEL: CPT | Performed by: FAMILY MEDICINE

## 2022-08-15 RX ORDER — AMLODIPINE BESYLATE 5 MG/1
5 TABLET ORAL DAILY
Qty: 90 TABLET | Refills: 0 | Status: SHIPPED | OUTPATIENT
Start: 2022-08-15 | End: 2022-09-05

## 2022-08-15 RX ORDER — HYDROCODONE BITARTRATE AND ACETAMINOPHEN 5; 325 MG/1; MG/1
1 TABLET ORAL EVERY 6 HOURS PRN
Qty: 10 TABLET | Refills: 0 | Status: ON HOLD | OUTPATIENT
Start: 2022-08-15 | End: 2023-02-23

## 2022-08-15 RX ORDER — TAMSULOSIN HYDROCHLORIDE 0.4 MG/1
0.4 CAPSULE ORAL DAILY
Qty: 90 CAPSULE | Refills: 1 | Status: SHIPPED | OUTPATIENT
Start: 2022-08-15 | End: 2022-09-05

## 2022-08-15 ASSESSMENT — PAIN SCALES - GENERAL: PAINLEVEL: NO PAIN (0)

## 2022-08-15 NOTE — PATIENT INSTRUCTIONS
Keep working on healthy diet/exercise and wt loss    We will send you lab results    Increase gabapentin to 3x daily    Start tamsulosin once daily ( instead of saw palmetto )    Start amlodipine 5 mg daily

## 2022-08-15 NOTE — LETTER
[facility]  [address] [place]  [place] [address]  [phone]             2022    To whom it may concern:    This is an order regarding blayne Puri 52    This authorizes and endorses continuation of foot care from the foot care nurse    He get nail care, foot massage, and other foot care as needed    This is medically appropriate and needed           Sincerely,                     Richi Jaramillo MD

## 2022-08-15 NOTE — LETTER
August 17, 2022      Canelo Alexandertracy  [address] [place] [address]        Dear ,    We are writing to inform you of your test results.    Diabetes test ( hemoglobin a1c ) is okay. Stay on metformin.     You have protein in the urine but your blood test for kidney function ( creatinine) is still normal, and you are on a max dose of losartan which helps protect the kidney.     One inflammation test ( sedimentation rate ) is only slightly high and the other ( c reactive protein ) is normal.  These could be rechecked in the future if needed.     Other labs are okay/ stable.         Resulted Orders   Albumin Random Urine Quantitative with Creat Ratio   Result Value Ref Range    Creatinine Urine mg/dL 174 mg/dL    Albumin Urine mg/L 109 mg/L    Albumin Urine mg/g Cr 62.64 (H) 0.00 - 17.00 mg/g Cr   Lipid panel reflex to direct LDL Non-fasting   Result Value Ref Range    Cholesterol 151 <200 mg/dL    Triglycerides 140 <150 mg/dL    Direct Measure HDL 46 >=40 mg/dL    LDL Cholesterol Calculated 77 <=100 mg/dL    Non HDL Cholesterol 105 <130 mg/dL    Patient Fasting > 8hrs? No     Narrative    Cholesterol  Desirable:  <200 mg/dL    Triglycerides  Normal:  Less than 150 mg/dL  Borderline High:  150-199 mg/dL  High:  200-499 mg/dL  Very High:  Greater than or equal to 500 mg/dL    Direct Measure HDL  Female:  Greater than or equal to 50 mg/dL   Male:  Greater than or equal to 40 mg/dL    LDL Cholesterol  Desirable:  <100mg/dL  Above Desirable:  100-129 mg/dL   Borderline High:  130-159 mg/dL   High:  160-189 mg/dL   Very High:  >= 190 mg/dL    Non HDL Cholesterol  Desirable:  130 mg/dL  Above Desirable:  130-159 mg/dL  Borderline High:  160-189 mg/dL  High:  190-219 mg/dL  Very High:  Greater than or equal to 220 mg/dL   TSH with free T4 reflex   Result Value Ref Range    TSH 3.47 0.40 - 4.00 mU/L   Comprehensive metabolic panel   Result Value Ref Range    Sodium 145 (H) 133 - 144 mmol/L    Potassium 4.2  3.4 - 5.3 mmol/L    Chloride 113 (H) 94 - 109 mmol/L    Carbon Dioxide (CO2) 24 20 - 32 mmol/L    Anion Gap 8 3 - 14 mmol/L    Urea Nitrogen 28 7 - 30 mg/dL    Creatinine 1.16 0.66 - 1.25 mg/dL    Calcium 9.1 8.5 - 10.1 mg/dL    Glucose 159 (H) 70 - 99 mg/dL    Alkaline Phosphatase 111 40 - 150 U/L    AST 51 (H) 0 - 45 U/L    ALT 61 0 - 70 U/L    Protein Total 7.3 6.8 - 8.8 g/dL    Albumin 3.5 3.4 - 5.0 g/dL    Bilirubin Total 1.1 0.2 - 1.3 mg/dL    GFR Estimate 67 >60 mL/min/1.73m2      Comment:      Effective December 21, 2021 eGFRcr in adults is calculated using the 2021 CKD-EPI creatinine equation which includes age and gender (Hugh et al., [facility], DOI: 10.1056/AWJRtz3943308)   Prostate Specific Antigen Screen   Result Value Ref Range    Prostate Specific Antigen Screen 1.66 0.00 - 4.00 ug/L   Hemoglobin A1c   Result Value Ref Range    Hemoglobin A1C 7.3 (H) 0.0 - 5.6 %      Comment:      Normal <5.7%   Prediabetes 5.7-6.4%    Diabetes 6.5% or higher     Note: Adopted from ADA consensus guidelines.   ESR: Erythrocyte sedimentation rate   Result Value Ref Range    Erythrocyte Sedimentation Rate 21 (H) 0 - 20 mm/hr   CRP, inflammation   Result Value Ref Range    CRP Inflammation 7.9 0.0 - 8.0 mg/L   Rheumatoid factor   Result Value Ref Range    Rheumatoid Factor 9 <12 IU/mL   Anti Nuclear Maddi IgG by IFA with Reflex   Result Value Ref Range    CRISS interpretation Negative Negative      Comment:        Negative:              <1:40  Borderline Positive:   1:40 - 1:80  Positive:              >1:80   Urine Creatinine for Drug Screen Panel   Result Value Ref Range    Creatinine Urine for Drug Screen 173 mg/dL      Comment:      The reference range has not been established for creatinine in random urines. The results should be integrated into the clinical context for interpretation.   CBC with platelets and differential   Result Value Ref Range    WBC Count 3.7 (L) 4.0 - 11.0 10e3/uL    RBC Count 4.07 (L) 4.40 - 5.90  10e6/uL    Hemoglobin 13.0 (L) 13.3 - 17.7 g/dL    Hematocrit 39.7 (L) 40.0 - 53.0 %    MCV 98 78 - 100 fL    MCH 31.9 26.5 - 33.0 pg    MCHC 32.7 31.5 - 36.5 g/dL    RDW 13.0 10.0 - 15.0 %    Platelet Count 63 (L) 150 - 450 10e3/uL    % Neutrophils 72 %    % Lymphocytes 16 %    % Monocytes 10 %    % Eosinophils 2 %    % Basophils 0 %    % Immature Granulocytes 0 %    NRBCs per 100 WBC 0 <1 /100    Absolute Neutrophils 2.6 1.6 - 8.3 10e3/uL    Absolute Lymphocytes 0.6 (L) 0.8 - 5.3 10e3/uL    Absolute Monocytes 0.4 0.0 - 1.3 10e3/uL    Absolute Eosinophils 0.1 0.0 - 0.7 10e3/uL    Absolute Basophils 0.0 0.0 - 0.2 10e3/uL    Absolute Immature Granulocytes 0.0 <=0.4 10e3/uL    Absolute NRBCs 0.0 10e3/uL       If you have any questions or concerns, please call the clinic at the number listed above.       Sincerely,      Richi Jaramillo MD/alvina

## 2022-08-15 NOTE — PROGRESS NOTES
Lakhwinder Lemos is a 72 year old , presenting for the following health issues:  Patient Request      HPI     A lot of physical issues with body  Discuss insurance problems       Review of Systems   Patient had nails trimmed from foot care nurse     Reauthorize for foot care    Compression stocking     Right hand problematic    Wrist hurts a lot    Stream not great    Wants blood work    ?dehydrated some times    No wrist trauma    Balance/neuropathy worrying patient    Has appointment with tco coming up ankle    Neck bothering some still    Gabapentin currently 300 mg bid           Objective    BP (!) 175/81 (BP Location: Right arm, Patient Position: Chair, Cuff Size: Adult Regular)   Pulse 61   Temp 97.9  F (36.6  C) (Temporal)   Wt 119.5 kg (263 lb 8 oz)   BMI 36.75 kg/m    Body mass index is 36.75 kg/m .  Physical Exam  Constitutional:       Appearance: He is well-developed.   HENT:      Head: Normocephalic and atraumatic.   Eyes:      Conjunctiva/sclera: Conjunctivae normal.   Neck:      Vascular: No carotid bruit.   Cardiovascular:      Rate and Rhythm: Normal rate and regular rhythm.      Heart sounds: Normal heart sounds.   Pulmonary:      Effort: Pulmonary effort is normal. No respiratory distress.      Breath sounds: Normal breath sounds.   Neurological:      Mental Status: He is alert and oriented to person, place, and time.      Cranial Nerves: No cranial nerve deficit.   Psychiatric:         Speech: Speech normal.         Behavior: Behavior normal.             Slightly pos phalens and tinels on the right hand/ wrist    Strength still very good bilat in hands/ fingers            Contractures on palm    Mild edema pitting bilat        .  ..     ASSESSMENT / PLAN:  (Z79.899) Encounter for long-term (current) use of medications  (primary encounter diagnosis)  Comment: reviewed meds in detail   Plan:      (N40.1) Benign prostatic hyperplasia with lower urinary tract symptoms, symptom details  unspecified  Comment: trial of this; discussed in detail   Plan: tamsulosin (FLOMAX) 0.4 MG capsule             (E11.43) Type 2 diabetes mellitus with diabetic autonomic neuropathy, without long-term current use of insulin (H)  Comment: check   Plan: Hemoglobin A1c             (Z12.11) Screen for colon cancer  Comment: patient due for this, ordered   Plan: Colonoscopy Screening  Referral        Patient to schedule     (I10) Hypertension goal BP (blood pressure) < 140/90  Comment: add this med; was on 10 mg in past, not on now. Watch for worsened swelling   Plan: Albumin Random Urine Quantitative with Creat         Ratio, amLODIPine (NORVASC) 5 MG tablet             (G89.4) Chronic pain syndrome  Comment: check lab  Plan: [id number] - Urine Drug Confirmation Panel         (Comprehensive)             (M25.50) Multiple joint pain  Comment: check inflammatory labs   Plan: ESR: Erythrocyte sedimentation rate, CRP,         inflammation, Rheumatoid factor, Anti Nuclear         Maddi IgG by IFA with Reflex             (E78.5) Hyperlipidemia LDL goal <100  Comment: check   Plan: Lipid panel reflex to direct LDL Non-fasting,         Comprehensive metabolic panel             (R53.83) Fatigue, unspecified type  Comment: check   Plan: TSH with free T4 reflex, CBC with Platelets &         Differential             (Z12.5) Screening for prostate cancer  Comment: psa  Plan: Prostate Specific Antigen Screen             (M25.512) Left shoulder pain, unspecified chronicity  Comment: refill, use sparingly   Plan: HYDROcodone-acetaminophen (NORCO) 5-325 MG         tablet             (M25.561) Right knee pain, unspecified chronicity  Comment: as above   Plan: HYDROcodone-acetaminophen (NORCO) 5-325 MG         tablet               I reviewed the patient's medications, allergies, medical history, family history, and social history.    Richi Jaramillo MD

## 2022-08-16 LAB
ANA SER QL IF: NEGATIVE
RHEUMATOID FACT SER NEPH-ACNC: 9 IU/ML

## 2022-08-17 NOTE — RESULT ENCOUNTER NOTE
Diabetes test ( hemoglobin a1c ) is okay. Stay on metformin.    You have protein in the urine but your blood test for kidney function ( creatinine) is still normal, and you are on a max dose of losartan which helps protect the kidney.    One inflammation test ( sedimentation rate ) is only slightly high and the other ( c reactive protein ) is normal.  These could be rechecked in the future if needed.    Other labs are okay/ stable.    Richi Jaramillo MD

## 2022-08-19 LAB — GABAPENTIN UR QL CFM: PRESENT

## 2022-08-23 ENCOUNTER — TELEPHONE (OUTPATIENT)
Dept: GASTROENTEROLOGY | Facility: CLINIC | Age: 72
End: 2022-08-23

## 2022-08-23 DIAGNOSIS — Z11.59 ENCOUNTER FOR SCREENING FOR OTHER VIRAL DISEASES: Primary | ICD-10-CM

## 2022-08-23 NOTE — TELEPHONE ENCOUNTER
Screening Questions    BlueKIND OF PREP RedLOCATION [review exclusion criteria] GreenSEDATION TYPE    Have you had a positive covid test in the last 90 days? N  If yes, what date?     Do you have a legal guardian or medical Power of ?  Are you able to give consent for your medical care? Y SELF (Sedation review/consideration needed)    1. Are you active on mychart? N    2. What insurance is in the chart? [employer]      3.   Ordering/Referring Provider: DEAL    4. BMI 36.33 [BMI OVER 40-EXTENDED PREP]  If greater than 40 review exclusion criteria [PAC APPT IF (MAC) @ U]      5.  Respiratory Screening:  [If yes to any of the following HOSPITAL setting only]     N  Do you use daily home oxygen?   Y-NO CPAP  Do you have mod to severe Obstructive Sleep Apnea?  [OKAY @ [facility]]   N   Do you have Pulmonary Hypertension?    N   Do you have UNCONTROLLED asthma?         6.   N Have you had a heart or lung transplant?    _____________________________    7.   N Are you currently on dialysis? [ If yes, G-PREP & HOSPITAL setting only]     8.   N  Do you have chronic kidney disease? [ If yes, G-PREP ]    9.   Y - T2 MILD Do you have a diagnosis of diabetes? [ If yes, G-PREP ]    10. N  On a regular basis do you go 3-5 days between bowel movements?  [ If yes, EXTENDED PREP.]  _____________________________    11.    N  Have you had a stroke or Transient ischemic attack (TIA - aka  mini stroke ) within 6 months? (If yes, please review exclusion criteria)          N In the past 6 months, have you had any heart related issues including cardiomyopathy or heart attack?           N If yes, did it require cardiac stenting or other implantable device?       12.   N  Do you have any implantable devices in your body (pacemaker, defib, LVAD)? (If yes, please review exclusion criteria)    13.   N Do you take nitroglycerin?            N If yes, how often?  (if yes, HOSPITAL setting ONLY)    14.  N  Are you currently taking  any blood thinners?           [IF YES, INFORM PATIENT TO FOLLOW UP W/ ORDERING PROVIDER FOR BRIDGING INSTRUCTIONS]     15.   N Do you take Phentermine?      Yes-> Hold for 7 days before procedure.  Please consult your prescribing provider if you have questions about holding this medication.      16.   [FEMALES] Are you currently pregnant?     If yes, how many weeks?   ____________________________    17.   N (PRN USE ONLY) Are you taking any prescription pain medications on a routine schedule?  [ If yes, EXTENDED PREP.] [If yes, MAC]    18.   N Do you have any chemical dependencies such as alcohol, street drugs, or methadone?  [If yes, MAC]    19.   Y- SOME PTSD  Do you have any history of post-traumatic stress syndrome, severe anxiety or history of psychosis?  [If yes, MAC]  ____________________________    20.   Do you transfer independently? (If NO, please HOSPITAL setting  only) Y      21.   Preferred LOCAL Pharmacy for Pre Prescription:                            [facility] [address] [place] [address] [address] [place] [address], [place]    Scheduling Details      Caller: Gucci Logan    (Please ask for phone number if not scheduled by patient)    Type of Procedure Scheduled: Lower Endoscopy [Colonoscopy]    Which Colonoscopy Prep was Sent?: GPREP  KARIS CF PATIENTS & GROEN'S PATIENTS NEEDS EXTENDED PREP    Surgeon: GIUSEPPE  Date of Procedure: 9/14  Location:     Sedation Type: MAC  PER BLOCK   Conscious Sedation- Needs  for 6 hours after the procedure  MAC/General-Needs  for 24 hours after procedure    Pre-op Required at [facility] [place], [place] and OR for MAC sedation:  N  (advise patient they will need a pre-op WITH IN 30 DAYS prior to procedure -)      Informed patient they will need an adult  Y  Cannot take any type of public or medical transportation alone    Pre-Procedure Covid test to be completed at [facility] or Externally/Informed of at home test option: 9/10/22  @[facility]    Confirmed Nurse will call to complete assessment Y    Additional comments:   PT REQUESTED INFO BE SENT TO [facility], LISTED UNDER 'CONFIDENTIAL'

## 2022-09-01 ENCOUNTER — TELEPHONE (OUTPATIENT)
Dept: FAMILY MEDICINE | Facility: CLINIC | Age: 72
End: 2022-09-01

## 2022-09-01 DIAGNOSIS — I10 HYPERTENSION GOAL BP (BLOOD PRESSURE) < 140/90: ICD-10-CM

## 2022-09-01 DIAGNOSIS — N40.1 BENIGN PROSTATIC HYPERPLASIA WITH LOWER URINARY TRACT SYMPTOMS, SYMPTOM DETAILS UNSPECIFIED: ICD-10-CM

## 2022-09-01 NOTE — TELEPHONE ENCOUNTER
[facility]  8[facility]38 AM    Addend                 amLODIPine (NORVASC) 5 MG tablet 90 tablet 0 8/15/2022      Patient wants Valsartan- BP too low on Amlodipine and Tamsulosin.

## 2022-09-01 NOTE — LETTER
September 8, 2022    Gucci Logan  [address] [place] [id number]      Dear Gucci Logan,     Your provider has sent a  thu refill of tamsulosin (FLOMAX) 0.4 MG capsule, losartan (COZAAR) 100 MG tablet, and amLODIPine (NORVASC) 5 MG tablet. You are due for an appointment for further refills.  Please contact the clinic to schedule an appointment for further refills. Please call our scheduling line at [phone] or you can schedule via [employer].         Your Health Care Team,    Team Blue

## 2022-09-01 NOTE — TELEPHONE ENCOUNTER
amLODIPine (NORVASC) 5 MG tablet 90 tablet 0 8/15/2022     Patient wants Valsartan- BP too low on Amlodipine and Tamsulosin.

## 2022-09-05 RX ORDER — TAMSULOSIN HYDROCHLORIDE 0.4 MG/1
0.4 CAPSULE ORAL DAILY
Qty: 90 CAPSULE | Refills: 0 | Status: SHIPPED | OUTPATIENT
Start: 2022-09-05 | End: 2022-09-29

## 2022-09-05 RX ORDER — AMLODIPINE BESYLATE 5 MG/1
5 TABLET ORAL DAILY
Qty: 90 TABLET | Refills: 0 | Status: SHIPPED | OUTPATIENT
Start: 2022-09-05 | End: 2022-09-07

## 2022-09-05 RX ORDER — LOSARTAN POTASSIUM 100 MG/1
TABLET ORAL
Qty: 90 TABLET | Refills: 0 | Status: SHIPPED | OUTPATIENT
Start: 2022-09-05 | End: 2022-09-22

## 2022-09-05 NOTE — TELEPHONE ENCOUNTER
Okay refills but see us in clinic in next 2-3 months    Please inform patient      Richi Jaramillo MD

## 2022-09-07 NOTE — TELEPHONE ENCOUNTER
"Routing to PCP.  Patient wanted to give you update on HTN.     Patient called to report he stopped taking the amlodipine due to fatigue and BP dropping to 130/50.  He is currently taking Losartan, Toprol XL, hydrochlorothiazide, and aldactone for HTN.     He does not know what his current BP is; however, he states he feels \"great\".  Denies headache, chest pain, SOB, and other symptoms.      Patient agreed to check his BP when he gets home from work today.  He will send BP readings via [employer] and call us for too high readings or if he becomes symptomatic.      Epic med list updated.     He has a follow up scheduled with PCP on 9/29.    Elliot Fairchild RN          "

## 2022-09-07 NOTE — TELEPHONE ENCOUNTER
I tried calling the patient. No answer and unable to leave a message due to the mailbox not being set up.    [facility]

## 2022-09-09 ENCOUNTER — ALLIED HEALTH/NURSE VISIT (OUTPATIENT)
Dept: FAMILY MEDICINE | Facility: CLINIC | Age: 72
End: 2022-09-09
Payer: COMMERCIAL

## 2022-09-09 VITALS — SYSTOLIC BLOOD PRESSURE: 150 MMHG | DIASTOLIC BLOOD PRESSURE: 66 MMHG

## 2022-09-09 DIAGNOSIS — I10 HYPERTENSION GOAL BP (BLOOD PRESSURE) < 140/90: ICD-10-CM

## 2022-09-09 DIAGNOSIS — Z01.30 BP CHECK: Primary | ICD-10-CM

## 2022-09-09 PROCEDURE — 99207 PR NO CHARGE NURSE ONLY: CPT | Performed by: FAMILY MEDICINE

## 2022-09-09 NOTE — PROGRESS NOTES
Gucci Logan was evaluated at [facility] on September 9, 2022 at which time his blood pressure was:    BP Readings from Last 3 Encounters:   09/09/22 (!) 150/66   08/15/22 (!) 150/72   07/07/22 (!) 164/76     Pulse Readings from Last 3 Encounters:   08/15/22 61   07/07/22 59   07/06/22 76       Reviewed lifestyle modifications for blood pressure control and reduction: including making healthy food choices, managing weight, getting regular exercise, smoking cessation, reducing alcohol consumption, monitoring blood pressure regularly.     Symptoms: None    BP Goal:< 140/90 mmHg    BP Assessment:  BP too high    Potential Reasons for BP too high: NA - Not applicable    BP Follow-Up Plan: Referral to PCP    Recommendation to Provider: Patient is requesting a potential switch from Losartan to Valsartan to help get his blood pressure under control. Patient mentioned that he tried amlodipine in the past and did not tolerate it as he became very sleepy and lethargic.    Note completed by:   Kelsy Maguire, PharmD MS  [facility]  Pharmacy Manager  September 9, 2022

## 2022-09-09 NOTE — Clinical Note
Patient was hoping to be prescribed a new medication after this blood pressure check. He met with an outside pharmacist who recommended switching from losartan to valsartan. Patient would fill with [facility] pharmacy if new prescription is given today, otherwise please follow up with patient for his preferred pharmacy.   Thanks, Kelsy

## 2022-09-10 ENCOUNTER — LAB (OUTPATIENT)
Dept: LAB | Facility: CLINIC | Age: 72
End: 2022-09-10

## 2022-09-10 DIAGNOSIS — Z11.59 ENCOUNTER FOR SCREENING FOR OTHER VIRAL DISEASES: ICD-10-CM

## 2022-09-10 PROCEDURE — U0005 INFEC AGEN DETEC AMPLI PROBE: HCPCS

## 2022-09-10 PROCEDURE — U0003 INFECTIOUS AGENT DETECTION BY NUCLEIC ACID (DNA OR RNA); SEVERE ACUTE RESPIRATORY SYNDROME CORONAVIRUS 2 (SARS-COV-2) (CORONAVIRUS DISEASE [COVID-19]), AMPLIFIED PROBE TECHNIQUE, MAKING USE OF HIGH THROUGHPUT TECHNOLOGIES AS DESCRIBED BY CMS-2020-01-R: HCPCS

## 2022-09-10 RX ORDER — AMLODIPINE BESYLATE 5 MG/1
5 TABLET ORAL DAILY
Qty: 30 TABLET | Refills: 1 | Status: SHIPPED | OUTPATIENT
Start: 2022-09-10 | End: 2022-09-29

## 2022-09-10 NOTE — PROGRESS NOTES
I sent in additional blood pressure med for patient    He should get his blood pressure rechecked at pharmacy in 3-4 weeks after starting med    Continue other meds as is    Please inform patient    Thanks    Richi Jaramillo MD

## 2022-09-11 LAB — SARS-COV-2 RNA RESP QL NAA+PROBE: NEGATIVE

## 2022-09-12 RX ORDER — BISACODYL 5 MG
TABLET, DELAYED RELEASE (ENTERIC COATED) ORAL
Qty: 4 TABLET | Refills: 0 | Status: SHIPPED | OUTPATIENT
Start: 2022-09-12 | End: 2022-09-29

## 2022-09-13 ENCOUNTER — ANESTHESIA EVENT (OUTPATIENT)
Dept: GASTROENTEROLOGY | Facility: CLINIC | Age: 72
End: 2022-09-13
Payer: COMMERCIAL

## 2022-09-13 ASSESSMENT — LIFESTYLE VARIABLES: TOBACCO_USE: 1

## 2022-09-14 ENCOUNTER — ANESTHESIA (OUTPATIENT)
Dept: GASTROENTEROLOGY | Facility: CLINIC | Age: 72
End: 2022-09-14
Payer: COMMERCIAL

## 2022-09-14 ENCOUNTER — HOSPITAL ENCOUNTER (OUTPATIENT)
Facility: CLINIC | Age: 72
Discharge: HOME | End: 2022-09-14
Attending: INTERNAL MEDICINE | Admitting: INTERNAL MEDICINE
Payer: COMMERCIAL

## 2022-09-14 VITALS
DIASTOLIC BLOOD PRESSURE: 72 MMHG | SYSTOLIC BLOOD PRESSURE: 151 MMHG | TEMPERATURE: 97.9 F | HEART RATE: 62 BPM | WEIGHT: 260 LBS | BODY MASS INDEX: 36.26 KG/M2 | RESPIRATION RATE: 16 BRPM | OXYGEN SATURATION: 95 %

## 2022-09-14 DIAGNOSIS — Z12.11 ENCOUNTER FOR SCREENING COLONOSCOPY: ICD-10-CM

## 2022-09-14 DIAGNOSIS — Z12.11 SCREEN FOR COLON CANCER: Primary | ICD-10-CM

## 2022-09-14 LAB — COLONOSCOPY: NORMAL

## 2022-09-14 PROCEDURE — 370N000017 HC ANESTHESIA TECHNICAL FEE, PER MIN: Performed by: INTERNAL MEDICINE

## 2022-09-14 PROCEDURE — 250N000009 HC RX 250: Performed by: NURSE ANESTHETIST, CERTIFIED REGISTERED

## 2022-09-14 PROCEDURE — 250N000013 HC RX MED GY IP 250 OP 250 PS 637: Performed by: INTERNAL MEDICINE

## 2022-09-14 PROCEDURE — G0105 COLORECTAL SCRN; HI RISK IND: HCPCS | Performed by: INTERNAL MEDICINE

## 2022-09-14 PROCEDURE — 258N000003 HC RX IP 258 OP 636: Performed by: NURSE ANESTHETIST, CERTIFIED REGISTERED

## 2022-09-14 PROCEDURE — 250N000011 HC RX IP 250 OP 636: Performed by: NURSE ANESTHETIST, CERTIFIED REGISTERED

## 2022-09-14 PROCEDURE — 45378 DIAGNOSTIC COLONOSCOPY: CPT | Performed by: INTERNAL MEDICINE

## 2022-09-14 RX ORDER — SODIUM CHLORIDE, SODIUM LACTATE, POTASSIUM CHLORIDE, CALCIUM CHLORIDE 600; 310; 30; 20 MG/100ML; MG/100ML; MG/100ML; MG/100ML
INJECTION, SOLUTION INTRAVENOUS CONTINUOUS
Status: DISCONTINUED | OUTPATIENT
Start: 2022-09-14 | End: 2022-09-14 | Stop reason: HOSPADM

## 2022-09-14 RX ORDER — LIDOCAINE HYDROCHLORIDE 20 MG/ML
INJECTION, SOLUTION INFILTRATION; PERINEURAL PRN
Status: DISCONTINUED | OUTPATIENT
Start: 2022-09-14 | End: 2022-09-14

## 2022-09-14 RX ORDER — PROPOFOL 10 MG/ML
INJECTION, EMULSION INTRAVENOUS PRN
Status: DISCONTINUED | OUTPATIENT
Start: 2022-09-14 | End: 2022-09-14

## 2022-09-14 RX ORDER — PROPOFOL 10 MG/ML
INJECTION, EMULSION INTRAVENOUS CONTINUOUS PRN
Status: DISCONTINUED | OUTPATIENT
Start: 2022-09-14 | End: 2022-09-14

## 2022-09-14 RX ORDER — SIMETHICONE
LIQUID (ML) MISCELLANEOUS PRN
Status: DISCONTINUED | OUTPATIENT
Start: 2022-09-14 | End: 2022-09-14 | Stop reason: HOSPADM

## 2022-09-14 RX ADMIN — LIDOCAINE HYDROCHLORIDE 1 ML: 10 INJECTION, SOLUTION EPIDURAL; INFILTRATION; INTRACAUDAL; PERINEURAL at 13:35

## 2022-09-14 RX ADMIN — SODIUM CHLORIDE, POTASSIUM CHLORIDE, SODIUM LACTATE AND CALCIUM CHLORIDE: 600; 310; 30; 20 INJECTION, SOLUTION INTRAVENOUS at 13:36

## 2022-09-14 RX ADMIN — PROPOFOL 200 MCG/KG/MIN: 10 INJECTION, EMULSION INTRAVENOUS at 13:43

## 2022-09-14 RX ADMIN — LIDOCAINE HYDROCHLORIDE 60 MG: 20 INJECTION, SOLUTION INFILTRATION; PERINEURAL at 13:43

## 2022-09-14 RX ADMIN — PROPOFOL 50 MG: 10 INJECTION, EMULSION INTRAVENOUS at 13:43

## 2022-09-14 ASSESSMENT — ACTIVITIES OF DAILY LIVING (ADL): ADLS_ACUITY_SCORE: 33

## 2022-09-14 NOTE — ANESTHESIA PREPROCEDURE EVALUATION
Anesthesia Pre-Procedure Evaluation    Patient: Gucci Logan   MRN: [id number] : 1950        Procedure : Procedure(s):  COLONOSCOPY          Past Medical History:   Diagnosis Date     Arthritis      Esophageal reflux 3/1/2014     Hearing problem      Hiatal hernia      Hypertension      Jaundice 2008     Liver disease      Obesity 2013     Obstructive sleep apnea      Sleep apnea     Advised CPAP machine. Not keen to use it.       Past Surgical History:   Procedure Laterality Date     ARTHROSCOPY KNEE RT/LT      (L) with partial medial meniscectomy     CATARACT IOL, RT/LT  Nov and Dec 2017     COLONOSCOPY N/A 2019    Procedure: COLONOSCOPY, WITH POLYPECTOMY AND BIOPSY;  Surgeon: Leventhal, Thomas Michael, MD;  Location: [facility] OR     DACRYOCYSTORHINOSTOMY Left 10/16/2018    Procedure: DACRYOCYSTORHINOSTOMY;  Surgeon: Madhu Krause MD;  Location: [facility]     ENDOSCOPIC ENDONASAL SURGERY       ENDOSCOPY  2-19-15     ESOPHAGOSCOPY, GASTROSCOPY, DUODENOSCOPY (EGD), COMBINED N/A 2019    Procedure: COMBINED ESOPHAGOSCOPY, GASTROSCOPY, DUODENOSCOPY (EGD) - hold aspirin ibuprofen or naproxen for one week prior (per physician order);  Surgeon: Leventhal, Thomas Michael, MD;  Location: [facility] OR     EYE SURGERY       Hernia surgery Left      NASAL/SINUS POLYPECTOMY       REPAIR PTOSIS Left 10/16/2018    Procedure: LEFT UPPER LID PTOSIS AND BILATERAL  BROW PTOSIS REPAIR WITH LEFT DACRYOCYSTORHINOSTOMY ;  Surgeon: Madhu Krause MD;  Location: [facility]     REPAIR PTOSIS BROW Bilateral 10/16/2018    Procedure: REPAIR PTOSIS BROW;  Surgeon: Madhu Krause MD;  Location: [facility]     ROTATOR CUFF REPAIR RT/LT Right      ZZC OPEN RX ANKLE DISLOCATN+FIXATN      (R)     ZZC SPINAL FUSION,ANT,EA ADNL LEVEL      T12 - L1      Allergies   Allergen Reactions     Influenza Vaccines Other (See Comments)     delirium  fever        Social History     Tobacco Use     Smoking status: Former  Smoker     Packs/day: 0.00     Years: 5.00     Pack years: 0.00     Types: Cigarettes     Start date: 1990     Quit date: 1999     Years since quittin.7     Smokeless tobacco: Never Used   Substance Use Topics     Alcohol use: Yes     Comment: rare      Wt Readings from Last 1 Encounters:   08/15/22 119.5 kg (263 lb 8 oz)        Anesthesia Evaluation   Pt has had prior anesthetic. Type: MAC and General.    No history of anesthetic complications       ROS/MED HX  ENT/Pulmonary:     (+) sleep apnea, moderate, uses CPAP, tobacco use, Past use,     Neurologic:  - neg neurologic ROS     Cardiovascular:     (+) Dyslipidemia hypertension-----Previous cardiac testing   Echo: Date: Results:    Stress Test: Date: Results:    ECG Reviewed: Date: 21 Results:  SR  Cath: Date: Results:      METS/Exercise Tolerance:     Hematologic:     (+) anemia,     Musculoskeletal:  - neg musculoskeletal ROS     GI/Hepatic: Comment: cirrhosis    (+) GERD, Symptomatic, hiatal hernia, hepatitis type C, liver disease,     Renal/Genitourinary:  - neg Renal ROS     Endo:     (+) type II DM, Last HgA1c: 7.3, date: 8/15/22, Not using insulin, - not using insulin pump. Obesity,     Psychiatric/Substance Use:  - neg psychiatric ROS     Infectious Disease:  - neg infectious disease ROS     Malignancy:  - neg malignancy ROS     Other:  - neg other ROS          Physical Exam    Airway  airway exam normal      Mallampati: III   TM distance: > 3 FB   Neck ROM: full   Mouth opening: > 3 cm    Respiratory Devices and Support         Dental       (+) caps      Cardiovascular   cardiovascular exam normal       Rhythm and rate: regular and normal     Pulmonary   pulmonary exam normal        breath sounds clear to auscultation           OUTSIDE LABS:  CBC:   Lab Results   Component Value Date    WBC 3.7 (L) 08/15/2022    WBC 4.5 2022    HGB 13.0 (L) 08/15/2022    HGB 12.5 (L) 2022    HCT 39.7 (L) 08/15/2022    HCT 37.9 (L) 2022     PLT 63 (L) 08/15/2022    PLT 48 (LL) 07/07/2022     BMP:   Lab Results   Component Value Date     (H) 08/15/2022     07/07/2022    POTASSIUM 4.2 08/15/2022    POTASSIUM 4.2 07/07/2022    CHLORIDE 113 (H) 08/15/2022    CHLORIDE 110 (H) 07/07/2022    CO2 24 08/15/2022    CO2 27 07/07/2022    BUN 28 08/15/2022    BUN 18 07/07/2022    CR 1.16 08/15/2022    CR 1.06 07/07/2022     (H) 08/15/2022     (H) 07/07/2022     COAGS:   Lab Results   Component Value Date    PTT 28 07/21/2021    INR 1.08 07/07/2022     POC:   Lab Results   Component Value Date     (H) 07/10/2020     HEPATIC:   Lab Results   Component Value Date    ALBUMIN 3.5 08/15/2022    PROTTOTAL 7.3 08/15/2022    ALT 61 08/15/2022    AST 51 (H) 08/15/2022    ALKPHOS 111 08/15/2022    BILITOTAL 1.1 08/15/2022    SHANI 35 07/21/2021     OTHER:   Lab Results   Component Value Date    LACT 1.4 07/08/2020    A1C 7.3 (H) 08/15/2022    SOLEDAD 9.1 08/15/2022    MAG 1.7 07/21/2021    LIPASE 137 07/21/2021    TSH 3.47 08/15/2022    T4 0.97 07/06/2022    T3 104 08/10/2018    CRP 7.9 08/15/2022    SED 21 (H) 08/15/2022       Anesthesia Plan    ASA Status:  3   NPO Status:  NPO Appropriate    Anesthesia Type: MAC.      Maintenance: TIVA.        Consents    Anesthesia Plan(s) and associated risks, benefits, and realistic alternatives discussed. Questions answered and patient/representative(s) expressed understanding.    - Discussed:     - Discussed with:  Patient    Use of blood products discussed: No .     Postoperative Care            Comments:    Other Comments: The risks and benefits of anesthesia, and the alternatives where applicable, have been discussed with the patient, and they wish to proceed.            Louis Hazel, APRN CRNA

## 2022-09-14 NOTE — H&P
[facility] Anesthesia Pre-op History and Physical    Gucci Logan MRN# [id number]   Age: 72 year old YOB: 1950      Date of Surgery: 9/14/2022 Location [facility]      Date of Exam 9/14/2022 Facility (Same day)       Home clinic: [facility]  Primary care provider: Richi Jaramillo         Chief Complaint and/or Reason for Procedure:   No chief complaint on file.  Colonoscpy.  Hx adenoma.  April 2019 colonoscopy.         Active problem list:     Patient Active Problem List    Diagnosis Date Noted     Abdominal pain 07/09/2020     Priority: Medium     Portal vein thrombosis 07/09/2020     Priority: Medium     Type 2 diabetes mellitus with diabetic autonomic neuropathy, without long-term current use of insulin (H) 02/20/2020     Priority: Medium     Other cirrhosis of liver (H) 02/20/2020     Priority: Medium     Obesity (BMI 35.0-39.9) with comorbidity (H) 11/09/2018     Priority: Medium     Diabetes mellitus, type 2 (H) 11/09/2018     Priority: Medium     Hyperlipidemia LDL goal <100 04/19/2017     Priority: Medium     Impaired fasting glucose 04/19/2017     Priority: Medium     Gastroesophageal reflux disease, esophagitis presence not specified 10/06/2016     Priority: Medium     IMO Regulatory Load OCT 2020       Obesity, unspecified obesity severity, unspecified obesity type 12/17/2015     Priority: Medium     SHONDA (obstructive sleep apnea) 02/07/2014     Priority: Medium     Hypertension goal BP (blood pressure) < 140/90 02/08/2013     Priority: Medium     Advanced directives, counseling/discussion 01/16/2013     Priority: Medium     Advance Care Planning:   ACP Review and Resources Provided:  Reviewed chart for advance care plan.  Gucci Logan has no plan or code status on file however states presence of ACP document. Copy requested. Confirmed code status reflects current choices pending receipt of document/advance care plan  review. Confirmed/documented designated decision maker(s). See permanent comments section of demographics in clinical tab.   Added by Elyssa Olguin on 7/22/2014  Patient states has Advance Directive and will bring in a copy to clinic. 1/16/2013              CARDIOVASCULAR SCREENING; LDL GOAL LESS THAN 130 01/16/2013     Priority: Medium     Vitamin D deficiency 09/19/2012     Priority: Medium     Overview:   9/19/2012       Osteoarthrosis 09/18/2012     Priority: Medium     Overview:   Lumbar spine, knees       Left ventricular hypertrophy 11/18/2011     Priority: Medium     Overview:   11/18/2011       Thrombocytopenia (H) 08/28/2008     Priority: Medium     Overview:   8/28/2008, attributed to liver disease with portal hypertension and functional splenomegaly       Splenomegaly 07/25/2008     Priority: Medium     Overview:   7/25/2009, attributed to portal hypertension from cirrhosis       Lymphadenopathy 06/20/2008     Priority: Medium     Overview:   6/20/2008 5/31/2011, CT: Increasing mediastinal and hilar lymphadenopathy and mild increase in perigastric lymph node. Findings are concerning for atypical infectious process. In the absence of pulmonary findings, with lymphomatous disorder in the differential. Clinical correlation is recommended.       Jaundice 05/31/2008     Priority: Medium     Chronic hepatitis C virus infection (H) 05/31/2008     Priority: Medium            Medications (include herbals and vitamins):   Any Plavix use in the last 7 days? No     No current facility-administered medications for this encounter.     Current Outpatient Medications   Medication Sig     atorvastatin (LIPITOR) 10 MG tablet Take 1 tablet (10 mg) by mouth daily     azithromycin (ZITHROMAX) 250 MG tablet TAKE 2 TABLETS BY MOUTH ON DAY 1, AND THEN TAKE 1 TABLET BY MOUTH ONCE A DAY ON DAY 2 THROUGH DAY 5     bisacodyl (DULCOLAX) 5 MG EC tablet Take 2 tablets at 3 pm the day before your procedure. If your procedure is  before 11 am, take 2 additional tablets at 8 pm. If your procedure is after 11 am, take 2 additional tablets at 6 am. For additional instructions refer to your colonoscopy prep instructions.     blood glucose (NO BRAND SPECIFIED) lancets standard Use to test blood sugar 2 times daily or as directed.     blood glucose (NO BRAND SPECIFIED) test strip Use to test blood sugar once time daily or as directed.     blood glucose monitoring (NO BRAND SPECIFIED) meter device kit Use to test blood sugar 1 times daily or as directed. Preferred blood glucose meter AND/OR supplies to accompany: Blood Glucose Monitor Brands: per insurance.     cholecalciferol 25 MCG (1000 UT) TABS Take 1,000 Units by mouth daily 2 tabs daily     gabapentin (NEURONTIN) 300 MG capsule Take 1 capsule (300 mg) by mouth 3 times daily     hydrochlorothiazide (HYDRODIURIL) 50 MG tablet Take 1 tablet (50 mg) by mouth daily     levothyroxine (SYNTHROID/LEVOTHROID) 25 MCG tablet Take 1 tablet (25 mcg) by mouth daily     losartan (COZAAR) 100 MG tablet 1 po daily     metFORMIN (GLUCOPHAGE) 500 MG tablet Take 1 tablet (500 mg) by mouth 2 times daily (with meals)     polyethylene glycol (GOLYTELY) 236 g suspension The night before the exam at 6 pm drink an 8-ounce glass every 15 minutes until the jug is half empty. If you arrive before 11 AM: Drink the other half of the Golytely jug at 11 PM night before procedure. If you arrive after 11 AM: Drink the other half of the Golytely jug at 6 AM day of procedure. For additional instructions refer to your colonoscopy prep instructions.     amLODIPine (NORVASC) 5 MG tablet Take 1 tablet (5 mg) by mouth daily     aspirin (ASA) 81 MG chewable tablet Take 81 mg by mouth daily     HYDROcodone-acetaminophen (NORCO) 5-325 MG tablet Take 1 tablet by mouth every 6 hours as needed for moderate to severe pain     methylPREDNISolone (MEDROL DOSEPAK) 4 MG tablet therapy pack Follow Package Directions     metoprolol succinate ER  (TOPROL XL) 100 MG 24 hr tablet Take 1 tablet (100 mg) by mouth daily     multivitamin w/minerals (THERA-VIT-M) tablet Take 1 tablet by mouth daily     omeprazole (PRILOSEC) 20 MG DR capsule TAKE 1 CAPSULE BY MOUTH ONCE DAILY 30 TO 60 MINUTES BEFORE A MEAL     spironolactone (ALDACTONE) 25 MG tablet Take 1 tablet (25 mg) by mouth daily     tamsulosin (FLOMAX) 0.4 MG capsule Take 1 capsule (0.4 mg) by mouth daily     traMADol (ULTRAM) 50 MG tablet TAKE 1 TABLET BY MOUTH EVERY 6 HOURS AS NEEDED FOR MODERATE TO SEVERE PAIN             Allergies:      Allergies   Allergen Reactions     Influenza Vaccines Other (See Comments)     delirium  fever       Allergy to Latex? No  Allergy to tape?   No  Intolerances:             Physical Exam:   All vitals have been reviewed  No data found.  No intake/output data recorded.  Lungs:   No increased work of breathing, good air exchange, clear to auscultation bilaterally, no crackles or wheezing     Cardiovascular:   Normal apical impulse, regular rate and rhythm, normal S1 and S2, no S3 or S4, and no murmur noted             Lab / Radiology Results:            Anesthetic risk and/or ASA classification:       Rafa Renee MD

## 2022-09-14 NOTE — ANESTHESIA CARE TRANSFER NOTE
Patient: Gucci Logan    Procedure: Procedure(s):  COLONOSCOPY       Diagnosis: Screen for colon cancer [Z12.11]  Diagnosis Additional Information: No value filed.    Anesthesia Type:   MAC     Note:    Oropharynx: oropharynx clear of all foreign objects and spontaneously breathing  Level of Consciousness: drowsy  Oxygen Supplementation: room air    Independent Airway: airway patency satisfactory and stable  Dentition: dentition unchanged  Vital Signs Stable: post-procedure vital signs reviewed and stable  Report to RN Given: handoff report given  Patient transferred to: Phase II    Handoff Report: Identifed the Patient, Identified the Reponsible Provider, Reviewed the pertinent medical history, Discussed the surgical course, Reviewed Intra-OP anesthesia mangement and issues during anesthesia, Set expectations for post-procedure period and Allowed opportunity for questions and acknowledgement of understanding      Vitals:  Vitals Value Taken Time   BP 96/78 09/14/22 1408   Temp     Pulse 71 09/14/22 1408   Resp     SpO2 94 % 09/14/22 1413   Vitals shown include unvalidated device data.    Electronically Signed By: DIANE Vo CRNA  September 14, 2022  2:14 PM

## 2022-09-14 NOTE — DISCHARGE INSTRUCTIONS
[facility]    Home Care Following Endoscopy          Activity:  You have just undergone an endoscopic procedure usually performed with conscious sedation.  Do not work or operate machinery (including a car) for at least 12 hours.    I encourage you to walk and attempt to pass this air as soon as possible.    Diet:  Return to the diet you were on before your procedure but eat lightly for the first 12-24 hours.  Drink plenty of water.  Resume any regular medications unless otherwise advised by your physician.  Please begin any new medication prescribed as a result of your procedure as directed by your physician.   If you had any biopsy or polyp removed please refrain from aspirin or aspirin products for 2 days.  If on Coumadin please restart as instructed by your physician.   Pain:  You may take Tylenol as needed for pain.  Expected Recovery:  You can expect some mild abdominal fullness and/or discomfort due to the air used to inflate your intestinal tract.    Call Your Physician if You Have:    After Colonoscopy:  Worsening persisting abdominal pain which is worse with activity.  Fevers (>101 degrees F), chills or shakes.  Passage of continued blood with bowel movements.   Any questions or concerns about your recovery, please call [phone].    Follow-up Care:  You did have polyps/biopsy tissue sample(s) removed.  The polyps/biopsy tissue sample(s) will be sent to pathology.  You should receive letter in your My Chart from Dr. Renee with your results within 1-2 weeks. If you do not participate in My Chart a physical letter will come in the mail in 2-3 weeks.  Please call if you have not received a notification of your results. Call [phone].

## 2022-09-14 NOTE — INTERVAL H&P NOTE
I have reviewed the surgical (or preoperative) H&P that is linked to this encounter, and examined the patient. There are no significant changes    Clinical Conditions Present on Arrival:  Clinically Significant Risk Factors Present on Admission           # Hypernatremia: Na = N/A on admission, will monitor as appropriate        # Thrombocytopenia: Plts = N/A on admission, will monitor for bleeding  # DMII: A1C = N/A within past 3 months

## 2022-09-14 NOTE — ANESTHESIA POSTPROCEDURE EVALUATION
Patient: Gucci Logan    Procedure: Procedure(s):  COLONOSCOPY       Anesthesia Type:  MAC    Note:  Disposition: Outpatient   Postop Pain Control: Uneventful            Sign Out: Well controlled pain   PONV: No   Neuro/Psych: Uneventful            Sign Out: Acceptable/Baseline neuro status   Airway/Respiratory: Uneventful            Sign Out: Acceptable/Baseline resp. status   CV/Hemodynamics: Uneventful            Sign Out: Acceptable CV status   Other NRE: NONE   DID A NON-ROUTINE EVENT OCCUR? No    Event details/Postop Comments:  Pt was happy with anesthesia care.  No complications.  I will follow up with the pt if needed.           Last vitals:  Vitals Value Taken Time   BP 96/78 09/14/22 1408   Temp     Pulse 71 09/14/22 1408   Resp     SpO2 94 % 09/14/22 1413   Vitals shown include unvalidated device data.    Electronically Signed By: DIANE Vo CRNA  September 14, 2022  2:15 PM

## 2022-09-21 ENCOUNTER — TRANSFERRED RECORDS (OUTPATIENT)
Dept: FAMILY MEDICINE | Facility: CLINIC | Age: 72
End: 2022-09-21

## 2022-09-22 DIAGNOSIS — K57.32 DIVERTICULITIS OF COLON: ICD-10-CM

## 2022-09-22 DIAGNOSIS — I10 HYPERTENSION GOAL BP (BLOOD PRESSURE) < 140/90: Primary | ICD-10-CM

## 2022-09-22 RX ORDER — VALSARTAN 160 MG/1
160 TABLET ORAL DAILY
Qty: 90 TABLET | Refills: 1 | Status: SHIPPED | OUTPATIENT
Start: 2022-09-22 | End: 2023-05-09

## 2022-09-22 RX ORDER — TRAMADOL HYDROCHLORIDE 50 MG/1
TABLET ORAL
Qty: 20 TABLET | Refills: 0 | Status: SHIPPED | OUTPATIENT
Start: 2022-09-22 | End: 2022-10-14

## 2022-09-29 ENCOUNTER — OFFICE VISIT (OUTPATIENT)
Dept: FAMILY MEDICINE | Facility: CLINIC | Age: 72
End: 2022-09-29
Payer: COMMERCIAL

## 2022-09-29 VITALS
OXYGEN SATURATION: 98 % | HEIGHT: 71 IN | SYSTOLIC BLOOD PRESSURE: 132 MMHG | HEART RATE: 60 BPM | TEMPERATURE: 98.6 F | WEIGHT: 266.25 LBS | BODY MASS INDEX: 37.27 KG/M2 | DIASTOLIC BLOOD PRESSURE: 58 MMHG

## 2022-09-29 DIAGNOSIS — I10 HYPERTENSION GOAL BP (BLOOD PRESSURE) < 140/90: ICD-10-CM

## 2022-09-29 DIAGNOSIS — Z00.00 ENCOUNTER FOR MEDICARE ANNUAL WELLNESS EXAM: Primary | ICD-10-CM

## 2022-09-29 DIAGNOSIS — Z00.00 ROUTINE GENERAL MEDICAL EXAMINATION AT A HEALTH CARE FACILITY: ICD-10-CM

## 2022-09-29 DIAGNOSIS — N40.1 BENIGN PROSTATIC HYPERPLASIA WITH LOWER URINARY TRACT SYMPTOMS, SYMPTOM DETAILS UNSPECIFIED: ICD-10-CM

## 2022-09-29 PROCEDURE — G0438 PPPS, INITIAL VISIT: HCPCS | Performed by: FAMILY MEDICINE

## 2022-09-29 RX ORDER — TAMSULOSIN HYDROCHLORIDE 0.4 MG/1
0.4 CAPSULE ORAL DAILY
Qty: 90 CAPSULE | Refills: 1 | Status: SHIPPED | OUTPATIENT
Start: 2022-09-29 | End: 2023-06-19

## 2022-09-29 RX ORDER — METOPROLOL SUCCINATE 100 MG/1
100 TABLET, EXTENDED RELEASE ORAL DAILY
Qty: 90 TABLET | Refills: 1 | Status: SHIPPED | OUTPATIENT
Start: 2022-09-29 | End: 2023-05-09

## 2022-09-29 RX ORDER — HYDROCHLOROTHIAZIDE 50 MG/1
50 TABLET ORAL DAILY
Qty: 90 TABLET | Refills: 1 | Status: SHIPPED | OUTPATIENT
Start: 2022-09-29 | End: 2023-05-09

## 2022-09-29 ASSESSMENT — ENCOUNTER SYMPTOMS
DIARRHEA: 0
NAUSEA: 0
PALPITATIONS: 0
HEMATURIA: 0
HEMATOCHEZIA: 0
PARESTHESIAS: 0
COUGH: 0
MYALGIAS: 1
DYSURIA: 0
HEADACHES: 0
EYE PAIN: 0
NERVOUS/ANXIOUS: 0
SORE THROAT: 0
DIZZINESS: 0
FEVER: 0
SHORTNESS OF BREATH: 0
FREQUENCY: 1
ABDOMINAL PAIN: 0
CONSTIPATION: 0
HEARTBURN: 0
JOINT SWELLING: 1
CHILLS: 0
ARTHRALGIAS: 1
WEAKNESS: 0

## 2022-09-29 ASSESSMENT — PAIN SCALES - GENERAL: PAINLEVEL: NO PAIN (0)

## 2022-09-29 ASSESSMENT — ACTIVITIES OF DAILY LIVING (ADL): CURRENT_FUNCTION: NO ASSISTANCE NEEDED

## 2022-09-29 NOTE — PROGRESS NOTES
"SUBJECTIVE:   Canelo is a 72 year old who presents for Preventive Visit.       Patient has been advised of split billing requirements and indicates understanding: Yes  Are you in the first 12 months of your Medicare coverage?  No    Healthy Habits:     In general, how would you rate your overall health?  Fair    Frequency of exercise:  6-7 days/week    Duration of exercise:  Greater than 60 minutes    Do you usually eat at least 4 servings of fruit and vegetables a day, include whole grains    & fiber and avoid regularly eating high fat or \"junk\" foods?  No    Taking medications regularly:  Yes    Medication side effects:  None    Ability to successfully perform activities of daily living:  No assistance needed    Home Safety:  No safety concerns identified    Hearing Impairment:  Difficulty following a conversation in a noisy restaurant or crowded room, feel that people are mumbling or not speaking clearly, difficulty following dialogue in the theater, need to ask people to speak up or repeat themselves, find that men's voices are easier to understand than woman's, difficulty understanding soft or whispered speech and difficulty understanding speech on the telephone    In the past 6 months, have you been bothered by leaking of urine?  No    In general, how would you rate your overall mental or emotional health?  Good      PHQ-2 Total Score: 0    Additional concerns today:  No    Do you feel safe in your environment? Yes    Have you ever done Advance Care Planning? (For example, a Health Directive, POLST, or a discussion with a medical provider or your loved ones about your wishes): No, advance care planning information given to patient to review.  Patient declined advance care planning discussion at this time.       Fall risk  Fallen 2 or more times in the past year?: No  Any fall with injury in the past year?: No    Cognitive Screening   1) Repeat 3 items (Leader, Season, Table)     2) Clock draw:   NORMAL  3) 3 " item recall:   Recalls 3 objects  Results: NORMAL clock, 1-2 items recalled: COGNITIVE IMPAIRMENT LESS LIKELY    Mini-CogTM Copyright S [employer]. Licensed by the author for use in [facility]; reprinted with permission (geraldo@[email or web address]). All rights reserved.      Do you have sleep apnea, excessive snoring or daytime drowsiness?: yes    Reviewed and updated as needed this visit by clinical staff   Tobacco  Allergies  Meds   Med Hx  Surg Hx  Fam Hx  Soc Hx          Reviewed and updated as needed this visit by Provider                   Social History     Tobacco Use     Smoking status: Former Smoker     Packs/day: 0.00     Years: 5.00     Pack years: 0.00     Types: Cigarettes     Start date: 1990     Quit date: 1999     Years since quittin.7     Smokeless tobacco: Never Used   Substance Use Topics     Alcohol use: Yes     Comment: rare     If you drink alcohol do you typically have >3 drinks per day or >7 drinks per week? Yes      Alcohol Use 2022   Prescreen: >3 drinks/day or >7 drinks/week? Not Applicable   Prescreen: >3 drinks/day or >7 drinks/week? -   No flowsheet data found.             Current providers sharing in care for this patient include:    Patient Care Team:  Richi Jaramillo MD as PCP - General (Family Medicine)  [facility] ([facility])  Richi Jaramillo MD as Assigned PCP  Edson Reza MD as Assigned Musculoskeletal Provider  Mata Renteria MD as Assigned Surgical Provider    The following health maintenance items are reviewed in Epic and correct as of today:  Health Maintenance   Topic Date Due     ANNUAL REVIEW OF HM ORDERS  2022     COVID-19 Vaccine (5 - Booster for Moderna series) 2022     A1C  02/15/2023     EYE EXAM  2023     DIABETIC FOOT EXAM  2023     BMP  08/15/2023     LIPID  08/15/2023     MICROALBUMIN  08/15/2023     URINE DRUG SCREEN  08/15/2023     MEDICARE ANNUAL WELLNESS VISIT  2023      FALL RISK ASSESSMENT  09/29/2023     COLORECTAL CANCER SCREENING  09/14/2025     DTAP/TDAP/TD IMMUNIZATION (3 - Td or Tdap) 10/06/2026     ADVANCE CARE PLANNING  09/29/2027     HEPATITIS C SCREENING  Completed     PHQ-2 (once per calendar year)  Completed     INFLUENZA VACCINE  Completed     Pneumococcal Vaccine: 65+ Years  Completed     ZOSTER IMMUNIZATION  Completed     AORTIC ANEURYSM SCREENING (SYSTEM ASSIGNED)  Completed     IPV IMMUNIZATION  Aged Out     MENINGITIS IMMUNIZATION  Aged Out                 Review of Systems   Constitutional: Negative for chills and fever.   HENT: Positive for hearing loss. Negative for congestion, ear pain and sore throat.    Eyes: Negative for pain and visual disturbance.   Respiratory: Negative for cough and shortness of breath.    Cardiovascular: Positive for peripheral edema. Negative for chest pain and palpitations.   Gastrointestinal: Negative for abdominal pain, constipation, diarrhea, heartburn, hematochezia and nausea.   Genitourinary: Positive for frequency. Negative for dysuria, genital sores, hematuria, impotence, penile discharge and urgency.   Musculoskeletal: Positive for arthralgias, joint swelling and myalgias.   Skin: Negative for rash.   Neurological: Negative for dizziness, weakness, headaches and paresthesias.   Psychiatric/Behavioral: Negative for mood changes. The patient is not nervous/anxious.    saw ENT    Not inner ear     Balance getting worse    No falls due to balance    Got fit for brace for dorsiflexion    To see neurology   Has appointment scheduled    End of October    Working a lot on farm    Staying active    Able to do the heavy lifting    Lethargic on amlodipine, very low blood pressure, so stopped amlodipine     On losartan but has prescription for valsartan    Neck bothersome          OBJECTIVE:   /58 (BP Location: Left arm, Patient Position: Chair, Cuff Size: Adult Regular)   Pulse 60   Temp 98.6  F (37  C) (Temporal)   Ht 1.803 m  "(5' 11\")   Wt 120.8 kg (266 lb 4 oz)   SpO2 98%   BMI 37.13 kg/m   Estimated body mass index is 37.13 kg/m  as calculated from the following:    Height as of this encounter: 1.803 m (5' 11\").    Weight as of this encounter: 120.8 kg (266 lb 4 oz).  Physical Exam  GENERAL: healthy, alert and no distress  EYES: Eyes grossly normal to inspection, PERRL and conjunctivae and sclerae normal  HENT: ear canals and TM's normal, nose and mouth without ulcers or lesions  NECK: no adenopathy, no asymmetry, masses, or scars and thyroid normal to palpation  RESP: lungs clear to auscultation - no rales, rhonchi or wheezes  CV: regular rate and rhythm, normal S1 S2, no S3 or S4, no murmur, click or rub, no peripheral edema and peripheral pulses strong  ABDOMEN: soft, nontender, no hepatosplenomegaly, no masses and bowel sounds normal  MS: no gross musculoskeletal defects noted, no edema  SKIN: no suspicious lesions or rashes  NEURO: Normal strength and tone, mentation intact and speech normal  PSYCH: mentation appears normal, affect normal/bright    Diagnostic Test Results:  Labs reviewed in Epic    ASSESSMENT / PLAN:   Gucci was seen today for wellness visit.    Diagnoses and all orders for this visit:    Encounter for Medicare annual wellness exam    Routine general medical examination at a health care facility    Hypertension goal BP (blood pressure) < 140/90  -     hydrochlorothiazide (HYDRODIURIL) 50 MG tablet; Take 1 tablet (50 mg) by mouth daily  -     metoprolol succinate ER (TOPROL XL) 100 MG 24 hr tablet; Take 1 tablet (100 mg) by mouth daily    Benign prostatic hyperplasia with lower urinary tract symptoms, symptom details unspecified  -     tamsulosin (FLOMAX) 0.4 MG capsule; Take 1 capsule (0.4 mg) by mouth daily    Discussed multiple issues  Refill needed meds  Went over recent labs in great detail  Up to date on colonoscopy   Keep working on healthy diet/exercise and wt loss  Balance not great-romberg okay but " "standing on one leg and heel toe walk abnormal  See neurology as planned  Blood pressure okay    Patient has been advised of split billing requirements and indicates understanding: Yes    COUNSELING:  Reviewed preventive health counseling, as reflected in patient instructions       Regular exercise       Healthy diet/nutrition       Vision screening       Hearing screening       Dental care    Estimated body mass index is 37.13 kg/m  as calculated from the following:    Height as of this encounter: 1.803 m (5' 11\").    Weight as of this encounter: 120.8 kg (266 lb 4 oz).    Weight management plan: Discussed healthy diet and exercise guidelines    He reports that he quit smoking about 23 years ago. His smoking use included cigarettes. He started smoking about 32 years ago. He smoked 0.00 packs per day for 5.00 years. He has never used smokeless tobacco.      Appropriate preventive services were discussed with this patient, including applicable screening as appropriate for cardiovascular disease, diabetes, osteopenia/osteoporosis, and glaucoma.  As appropriate for age/gender, discussed screening for colorectal cancer, prostate cancer, breast cancer, and cervical cancer. Checklist reviewing preventive services available has been given to the patient.    Reviewed patients plan of care and provided an AVS. The Basic Care Plan (routine screening as documented in Health Maintenance) for Gucci meets the Care Plan requirement. This Care Plan has been established and reviewed with the Patient.    Counseling Resources:  ATP IV Guidelines  Pooled Cohorts Equation Calculator  Breast Cancer Risk Calculator  Breast Cancer: Medication to Reduce Risk  FRAX Risk Assessment  ICSI Preventive Guidelines  Dietary Guidelines for Americans, 2010  [employer]'s MyPlate  ASA Prophylaxis  Lung CA Screening    Richi Jaramillo MD  [facility]    Identified Health Risks:    The patient was provided with suggestions to help him " develop a healthy physical lifestyle.  The patient was counseled and encouraged to consider modifying their diet and eating habits. He was provided with information on recommended healthy diet options.  The patient was provided with written information regarding signs of hearing loss.

## 2022-09-29 NOTE — PATIENT INSTRUCTIONS
When you run out of losartan, switch to valsartan ( prescription at pharmacy)    Other meds as is    Do some balance exercises    Keep working on healthy diet/exercise and wt loss    See neurology as planned         Patient Education   Personalized Prevention Plan  You are due for the preventive services outlined below.  Your care team is available to assist you in scheduling these services.  If you have already completed any of these items, please share that information with your care team to update in your medical record.  Health Maintenance Due   Topic Date Due    ANNUAL REVIEW OF HM ORDERS  08/26/2022    COVID-19 Vaccine (5 - Booster for Moderna series) 09/01/2022     Your Health Risk Assessment indicates you feel you are not in good health    A healthy lifestyle helps keep the body fit and the mind alert. It helps protect you from disease, helps you fight disease, and helps prevent chronic disease (disease that doesn't go away) from getting worse. This is important as you get older and begin to notice twinges in muscles and joints and a decline in the strength and stamina you once took for granted. A healthy lifestyle includes good healthcare, good nutrition, weight control, recreation, and regular exercise. Avoid harmful substances and do what you can to keep safe. Another part of a healthy lifestyle is stay mentally active and socially involved.    Good healthcare   Have a wellness visit every year.   If you have new symptoms, let us know right away. Don't wait until the next checkup.   Take medicines exactly as prescribed and keep your medicines in a safe place. Tell us if your medicine causes problems.   Healthy diet and weight control   Eat 3 or 4 small, nutritious, low-fat, high-fiber meals a day. Include a variety of fruits, vegetables, and whole-grain foods.   Make sure you get enough calcium in your diet. Calcium, vitamin D, and exercise help prevent osteoporosis (bone thinning).   If you live alone,  try eating with others when you can. That way you get a good meal and have company while you eat it.   Try to keep a healthy weight. If you eat more calories than your body uses for energy, it will be stored as fat and you will gain weight.     Recreation   Recreation is not limited to sports and team events. It includes any activity that provides relaxation, interest, enjoyment, and exercise. Recreation provides an outlet for physical, mental, and social energy. It can give a sense of worth and achievement. It can help you stay healthy.    Mental Exercise and Social Involvement  Mental and emotional health is as important as physical health. Keep in touch with friends and family. Stay as active as possible. Continue to learn and challenge yourself.   Things you can do to stay mentally active are:  Learn something new, like a foreign language or musical instrument.   Play SCRABBLE or do crossword puzzles. If you cannot find people to play these games with you at home, you can play them with others on your computer through the Internet.   Join a games club--anything from card games to chess or checkers or lawn bowling.   Start a new hobby.   Go back to school.   Volunteer.   Read.   Keep up with world events.    Understanding USDA MyPlate  The USDA has guidelines to help you make healthy food choices. These are called MyPlate. MyPlate shows the food groups that make up healthy meals using the image of a place setting. Before you eat, think about the healthiest choices for what to put on your plate or in your cup or bowl. To learn more about building a healthy plate, visit www.choosemyplate.gov.    The food groups  Fruits. Any fruit or 100% fruit juice counts as part of the Fruit Group. Fruits may be fresh, canned, frozen, or dried, and may be whole, cut-up, or pureed. Make 1/2 of your plate fruits and vegetables.  Vegetables. Any vegetable or 100% vegetable juice counts as a member of the Vegetable Group. Vegetables  may be fresh, frozen, canned, or dried. They can be served raw or cooked and may be whole, cut-up, or mashed. Make 1/2 of your plate fruits and vegetables.  Grains. All foods made from grains are part of the Grains Group. These include wheat, rice, oats, cornmeal, and barley. Grains are often used to make foods such as bread, pasta, oatmeal, cereal, tortillas, and grits. Grains should be no more than 1/4 of your plate. At least half of your grains should be whole grains.  Protein. This group includes meat, poultry, seafood, beans and peas, eggs, processed soy products (such as tofu), nuts (including nut butters), and seeds. Make protein choices no more than 1/4 of your plate. Meat and poultry choices should be lean or low fat.  Dairy. The Dairy Group includes all fluid milk products and foods made from milk that contain calcium, such as yogurt and cheese. (Foods that have little calcium, such as cream, butter, and cream cheese, are not part of this group.) Most dairy choices should be low-fat or fat-free.  Oils. Oils aren't a food group, but they do contain essential nutrients. However it's important to watch your intake of oils. These are fats that are liquid at room temperature. They include canola, corn, olive, soybean, vegetable, and sunflower oil. Foods that are mainly oil include mayonnaise, certain salad dressings, and soft margarines. You likely already get your daily oil allowance from the foods you eat.  Things to limit  Eating healthy also means limiting these things in your diet:     Salt (sodium). Many processed foods have a lot of sodium. To keep sodium intake down, eat fresh vegetables, meats, poultry, and seafood when possible. Purchase low-sodium, reduced-sodium, or no-salt-added food products at the store. And don't add salt to your meals at home. Instead, season them with herbs and spices such as dill, oregano, cumin, and paprika. Or try adding flavor with lemon or lime zest and juice.  Saturated  fat. Saturated fats are most often found in animal products such as beef, pork, and chicken. They are often solid at room temperature, such as butter. To reduce your saturated fat intake, choose leaner cuts of meat and poultry. And try healthier cooking methods such as grilling, broiling, roasting, or baking. For a simple lower-fat swap, use plain nonfat yogurt instead of mayonnaise when making potato salad or macaroni salad.  Added sugars. These are sugars added to foods. They are in foods such as ice cream, candy, soda, fruit drinks, sports drinks, energy drinks, cookies, pastries, jams, and syrups. Cut down on added sugars by sharing sweet treats with a family member or friend. You can also choose fruit for dessert, and drink water or other unsweetened beverages.     [employer] last reviewed this educational content on 6/1/2020 2000-2021 [employer]. All rights reserved. This information is not intended as a substitute for professional medical care. Always follow your healthcare professional's instructions.          Signs of Hearing Loss      Hearing much better with one ear can be a sign of hearing loss.   Hearing loss is a problem shared by many people. In fact, it is one of the most common health problems, particularly as people age. Most people age 65 and older have some hearing loss. By age 80, almost everyone does. Hearing loss often occurs slowly over the years. So you may not realize your hearing has gotten worse.  Have your hearing checked  Call your healthcare provider if you:  Have to strain to hear normal conversation  Have to watch other people s faces very carefully to follow what they re saying  Need to ask people to repeat what they ve said  Often misunderstand what people are saying  Turn the volume of the television or radio up so high that others complain  Feel that people are mumbling when they re talking to you  Find that the effort to hear leaves you feeling tired and  irritated  Notice, when using the phone, that you hear better with one ear than the other  [employer] last reviewed this educational content on 1/1/2020 2000-2021 [employer]. All rights reserved. This information is not intended as a substitute for professional medical care. Always follow your healthcare professional's instructions.

## 2022-10-24 DIAGNOSIS — I10 HYPERTENSION GOAL BP (BLOOD PRESSURE) < 140/90: ICD-10-CM

## 2022-10-26 RX ORDER — METOPROLOL SUCCINATE 100 MG/1
TABLET, EXTENDED RELEASE ORAL
Qty: 90 TABLET | Refills: 0 | OUTPATIENT
Start: 2022-10-26

## 2022-10-26 NOTE — TELEPHONE ENCOUNTER
Refills remain on file. Refused.     Tamara Ann, RN, BSN, PHN  [facility]: [place]

## 2022-10-31 ENCOUNTER — TELEPHONE (OUTPATIENT)
Dept: FAMILY MEDICINE | Facility: CLINIC | Age: 72
End: 2022-10-31

## 2022-10-31 DIAGNOSIS — J20.9 ACUTE BRONCHITIS, UNSPECIFIED ORGANISM: Primary | ICD-10-CM

## 2022-10-31 RX ORDER — PREDNISONE 10 MG/1
20 TABLET ORAL DAILY
Qty: 10 TABLET | Refills: 0 | Status: SHIPPED | OUTPATIENT
Start: 2022-10-31 | End: 2023-01-02

## 2022-10-31 NOTE — TELEPHONE ENCOUNTER
Medication Question or Refill    Contacts       Type Contact Phone/Fax    10/31/2022 05:47 PM CDT Phone (Incoming) Canelo Logan (Self)           What medication are you calling about (include dose and sig)?: Tramadol and prednisone    Controlled Substance Agreement on file:   CSA -- Patient Level:    CSA: None found at the patient level.       Who prescribed the medication?: Dr. Richi Jaramillo    Do you need a refill? Yes:    When did you use the medication last? 10/30/2022    Patient offered an appointment? No    Do you have any questions or concerns?  No    Preferred Pharmacy:   WRITTEN PRESCRIPTION REQUESTED  No address on file      [facility] [id number]  [place], [place] [address]  [address] [address]  Phone: [phone] Fax: [phone]      Okay to leave a detailed message?: Yes at Cell number on file:    Telephone Information:   Mobile [phone]

## 2022-12-08 ENCOUNTER — TELEPHONE (OUTPATIENT)
Dept: FAMILY MEDICINE | Facility: CLINIC | Age: 72
End: 2022-12-08

## 2022-12-08 NOTE — TELEPHONE ENCOUNTER
Reason for Call:  Other call back    Detailed comments: patient would like Dr Jaramillo to do a referral for Hematology for [facility]. If Dr Jaramillo would like to have a virtual with patient or phone call with patient please call and set that up.     Phone Number Patient can be reached at: Cell number on file:    Telephone Information:   Mobile [phone]       Best Time: anytime    Can we leave a detailed message on this number? NO    Call taken on 12/8/2022 at 11:47 AM by Tahmina Frederick

## 2022-12-09 NOTE — TELEPHONE ENCOUNTER
"Spoke with pt. Pt wanted it noted in his chart that he is the only one that answers this number and does not have voicemail. He saw Dr. Renteria neurologist for his neuropathy. She wanted to do tests, but Dr. Jaramillo had done them already except for 1, and pt was going to have this done at [facility]. States Dr. Renteria mentioned pt's low blood count, platelets, WBC, RBC and wondered why Dr. Jaramillo had not sent him to hematology, so this is the reason for the referral request. Dr. Renteria wanted him to do an EMG. Pt did not want to do this. He was looking for a \"magic powder\" to treat his neuropathy. Pt thinks the result of low blood counts are result of medications he took for his Hep C. Not sure how Dr. Jaramillo interprets it. 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 Jaramillo could refer him to hematology in [place]. Was hoping to get this done prior to [place] or shortly after. States the closest and fastest within reason. Pt states as a footnote, at any time Dr. Jaramillo wants a telephone or video visit, he will do this.  Pt doesn't have voicemail. No mychart. Will need to try calling again if pt doesn't answer.   Routing to PCP to advise.    Katie Nair RN  [facility]    "

## 2022-12-09 NOTE — TELEPHONE ENCOUNTER
I called and spoke with patient and scheduled him for a phone visit on 12/12/2022 with Dr. Clint Conde CMA

## 2022-12-12 ENCOUNTER — VIRTUAL VISIT (OUTPATIENT)
Dept: FAMILY MEDICINE | Facility: CLINIC | Age: 72
End: 2022-12-12
Payer: COMMERCIAL

## 2022-12-12 DIAGNOSIS — G62.9 PERIPHERAL POLYNEUROPATHY: ICD-10-CM

## 2022-12-12 DIAGNOSIS — N40.1 BENIGN PROSTATIC HYPERPLASIA WITH LOWER URINARY TRACT SYMPTOMS, SYMPTOM DETAILS UNSPECIFIED: Primary | ICD-10-CM

## 2022-12-12 DIAGNOSIS — F41.9 ANXIETY: ICD-10-CM

## 2022-12-12 DIAGNOSIS — M19.90 ARTHRITIS: ICD-10-CM

## 2022-12-12 PROCEDURE — 99443 PR PHYSICIAN TELEPHONE EVALUATION 21-30 MIN: CPT | Mod: 95 | Performed by: FAMILY MEDICINE

## 2022-12-12 NOTE — PROGRESS NOTES
Canelo is a 72 year old who is being evaluated via a billable telephone visit.      What phone number would you like to be contacted at? [phone]  How would you like to obtain your AVS? Mail a copy    Distant Location (provider location):  On-site         Subjective   Canelo is a 72 year old, presenting for the following health issues:  Patient Request      HPI     Discuss multiple things  Medication refills       Review of Systems    patient has several issues/ concerns    Neuropathy and balance worsening    Patient saw a provider near [facility] Dr. Myra Ronquillo [facility]     [place]    Stays active    No unexplained falls    Discussed tamulosin    nightsweats    Uses rolled up towel under neck    Knee bad    Right ankle is the most painful area    Severe arthritis          Objective           Vitals:  No vitals were obtained today due to virtual visit.    Physical Exam   healthy, alert and no distress  PSYCH: Alert and oriented times 3; coherent speech, normal   rate and volume, able to articulate logical thoughts, able   to abstract reason, no tangential thoughts, no hallucinations   or delusions  His affect is normal  RESP: No cough, no audible wheezing, able to talk in full sentences  Remainder of exam unable to be completed due to telephone visits       ASSESSMENT / PLAN:  (N40.1) Benign prostatic hyperplasia with lower urinary tract symptoms, symptom details unspecified  (primary encounter diagnosis)  Comment: patient asked about going up on dose but given his dizziness already, best to hold at current   0.4 mg dose   Plan: as above     (G62.9) Peripheral polyneuropathy  Comment: patient will clarify with the outside neurologist if they wanted any blood tests done and let us know, and I could add some bloodwork also   Plan: as above     (M19.90) Arthritis  Comment: discussed in detail.   Hold off on additional meds   Plan: monitor    (F41.9) Anxiety  Comment: patient stressed about things  but functioning okay   Plan: as above    See us in the next 1-2 months in clinic       I reviewed the patient's medications, allergies, medical history, family history, and social history.    Richi Jaramillo MD              Phone call duration: 27 minutes

## 2022-12-14 DIAGNOSIS — G62.9 NEUROPATHY: Primary | ICD-10-CM

## 2023-01-06 ENCOUNTER — ALLIED HEALTH/NURSE VISIT (OUTPATIENT)
Dept: FAMILY MEDICINE | Facility: CLINIC | Age: 73
End: 2023-01-06

## 2023-01-06 ENCOUNTER — LAB (OUTPATIENT)
Dept: LAB | Facility: CLINIC | Age: 73
End: 2023-01-06
Payer: COMMERCIAL

## 2023-01-06 VITALS — SYSTOLIC BLOOD PRESSURE: 144 MMHG | DIASTOLIC BLOOD PRESSURE: 66 MMHG

## 2023-01-06 DIAGNOSIS — G62.9 NEUROPATHY: ICD-10-CM

## 2023-01-06 DIAGNOSIS — Z01.30 BP CHECK: Primary | ICD-10-CM

## 2023-01-06 DIAGNOSIS — K74.60 CIRRHOSIS OF LIVER WITHOUT ASCITES, UNSPECIFIED HEPATIC CIRRHOSIS TYPE (H): ICD-10-CM

## 2023-01-06 LAB
AFP SERPL-MCNC: 1.9 NG/ML
ALBUMIN SERPL-MCNC: 3.4 G/DL (ref 3.4–5)
ALP SERPL-CCNC: 127 U/L (ref 40–150)
ALT SERPL W P-5'-P-CCNC: 73 U/L (ref 0–70)
ANION GAP SERPL CALCULATED.3IONS-SCNC: 7 MMOL/L (ref 3–14)
AST SERPL W P-5'-P-CCNC: 56 U/L (ref 0–45)
BILIRUB DIRECT SERPL-MCNC: 0.3 MG/DL (ref 0–0.2)
BILIRUB SERPL-MCNC: 1.3 MG/DL (ref 0.2–1.3)
BUN SERPL-MCNC: 21 MG/DL (ref 7–30)
CALCIUM SERPL-MCNC: 9.1 MG/DL (ref 8.5–10.1)
CHLORIDE BLD-SCNC: 108 MMOL/L (ref 94–109)
CO2 SERPL-SCNC: 26 MMOL/L (ref 20–32)
CREAT SERPL-MCNC: 1.07 MG/DL (ref 0.66–1.25)
ERYTHROCYTE [DISTWIDTH] IN BLOOD BY AUTOMATED COUNT: 13.5 % (ref 10–15)
GFR SERPL CREATININE-BSD FRML MDRD: 74 ML/MIN/1.73M2
GLUCOSE BLD-MCNC: 181 MG/DL (ref 70–99)
HCT VFR BLD AUTO: 39 % (ref 40–53)
HGB BLD-MCNC: 12.9 G/DL (ref 13.3–17.7)
INR PPP: 1.22 (ref 0.85–1.15)
MCH RBC QN AUTO: 32.3 PG (ref 26.5–33)
MCHC RBC AUTO-ENTMCNC: 33 G/DL (ref 31.5–36.5)
MCV RBC AUTO: 98 FL (ref 78–100)
PLATELET # BLD AUTO: 55 10E3/UL (ref 150–450)
POTASSIUM BLD-SCNC: 4.4 MMOL/L (ref 3.4–5.3)
PROT SERPL-MCNC: 7.2 G/DL (ref 6.8–8.8)
RBC # BLD AUTO: 4 10E6/UL (ref 4.4–5.9)
SODIUM SERPL-SCNC: 141 MMOL/L (ref 133–144)
TOTAL PROTEIN SERUM FOR ELP: 6.4 G/DL (ref 6.4–8.3)
WBC # BLD AUTO: 4.3 10E3/UL (ref 4–11)

## 2023-01-06 PROCEDURE — 82248 BILIRUBIN DIRECT: CPT

## 2023-01-06 PROCEDURE — 86334 IMMUNOFIX E-PHORESIS SERUM: CPT

## 2023-01-06 PROCEDURE — 85027 COMPLETE CBC AUTOMATED: CPT

## 2023-01-06 PROCEDURE — 99207 PR NO CHARGE NURSE ONLY: CPT | Performed by: FAMILY MEDICINE

## 2023-01-06 PROCEDURE — 80053 COMPREHEN METABOLIC PANEL: CPT

## 2023-01-06 PROCEDURE — 84165 PROTEIN E-PHORESIS SERUM: CPT

## 2023-01-06 PROCEDURE — 84155 ASSAY OF PROTEIN SERUM: CPT | Mod: 59

## 2023-01-06 PROCEDURE — 85610 PROTHROMBIN TIME: CPT

## 2023-01-06 PROCEDURE — 82105 ALPHA-FETOPROTEIN SERUM: CPT

## 2023-01-06 PROCEDURE — 36415 COLL VENOUS BLD VENIPUNCTURE: CPT

## 2023-01-06 NOTE — PROGRESS NOTES
Gucci Logan was evaluated at [facility] on January 6, 2023 at which time his blood pressure was:    BP Readings from Last 3 Encounters:   09/29/22 132/58   09/14/22 (!) 151/72   09/09/22 (!) 150/66     Pulse Readings from Last 3 Encounters:   09/29/22 60   09/14/22 62   08/15/22 61       Reviewed lifestyle modifications for blood pressure control and reduction: including making healthy food choices, managing weight, getting regular exercise, smoking cessation, reducing alcohol consumption, monitoring blood pressure regularly.     Symptoms: None    BP Goal:< 140/90 mmHg    BP Assessment:  BP at goal    Potential Reasons for BP too high: NA - Not applicable    BP Follow-Up Plan: Recheck BP in 30 days at pharmacy    Recommendation to Provider: consider a dose increase with valsartan    Note completed by:   Kelsy Maguire, PharmD MS  [facility]  Pharmacy Manager  January 6, 2023

## 2023-01-07 ENCOUNTER — NURSE TRIAGE (OUTPATIENT)
Dept: NURSING | Facility: CLINIC | Age: 73
End: 2023-01-07

## 2023-01-07 DIAGNOSIS — J20.9 ACUTE BRONCHITIS, UNSPECIFIED ORGANISM: ICD-10-CM
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
## 2023-01-09 LAB
ALBUMIN SERPL ELPH-MCNC: 3.7 G/DL (ref 3.7–5.1)
ALPHA1 GLOB SERPL ELPH-MCNC: 0.3 G/DL (ref 0.2–0.4)
ALPHA2 GLOB SERPL ELPH-MCNC: 0.6 G/DL (ref 0.5–0.9)
B-GLOBULIN SERPL ELPH-MCNC: 0.9 G/DL (ref 0.6–1)
GAMMA GLOB SERPL ELPH-MCNC: 1 G/DL (ref 0.7–1.6)
M PROTEIN SERPL ELPH-MCNC: 0 G/DL
PROT PATTERN SERPL ELPH-IMP: NORMAL
PROT PATTERN SERPL IFE-IMP: NORMAL

## 2023-01-09 RX ORDER — PREDNISONE 10 MG/1
TABLET ORAL
Qty: 10 TABLET | Refills: 0 | Status: SHIPPED | OUTPATIENT
Start: 2023-01-09 | End: 2023-01-18

## 2023-01-09 NOTE — TELEPHONE ENCOUNTER
Spoke with Dr. Jaramillo who stated that prednisone would be okay to re-send. Called patient x2 to ask if he would like it sent to [facility] pharmacy again. Voice mail box not set up, so unable to get a hold of patient or leave message.     Margarette Crowell RN   [facility]
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
## 2023-01-17 ENCOUNTER — NURSE TRIAGE (OUTPATIENT)
Dept: FAMILY MEDICINE | Facility: CLINIC | Age: 73
End: 2023-01-17
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
## 2023-01-18 ENCOUNTER — TELEPHONE (OUTPATIENT)
Dept: FAMILY MEDICINE | Facility: CLINIC | Age: 73
End: 2023-01-18

## 2023-01-18 ENCOUNTER — OFFICE VISIT (OUTPATIENT)
Dept: FAMILY MEDICINE | Facility: CLINIC | Age: 73
End: 2023-01-18
Payer: COMMERCIAL

## 2023-01-18 VITALS
DIASTOLIC BLOOD PRESSURE: 60 MMHG | HEIGHT: 71 IN | BODY MASS INDEX: 37.96 KG/M2 | HEART RATE: 70 BPM | OXYGEN SATURATION: 96 % | RESPIRATION RATE: 14 BRPM | TEMPERATURE: 97.6 F | WEIGHT: 271.13 LBS | SYSTOLIC BLOOD PRESSURE: 130 MMHG

## 2023-01-18 DIAGNOSIS — E66.9 OBESITY, UNSPECIFIED CLASSIFICATION, UNSPECIFIED OBESITY TYPE, UNSPECIFIED WHETHER SERIOUS COMORBIDITY PRESENT: Primary | ICD-10-CM

## 2023-01-18 DIAGNOSIS — R53.83 FATIGUE, UNSPECIFIED TYPE: ICD-10-CM

## 2023-01-18 DIAGNOSIS — K21.9 GASTROESOPHAGEAL REFLUX DISEASE WITHOUT ESOPHAGITIS: ICD-10-CM

## 2023-01-18 DIAGNOSIS — M25.511 RIGHT SHOULDER PAIN, UNSPECIFIED CHRONICITY: ICD-10-CM

## 2023-01-18 DIAGNOSIS — I10 HYPERTENSION GOAL BP (BLOOD PRESSURE) < 140/90: ICD-10-CM

## 2023-01-18 DIAGNOSIS — E78.5 HYPERLIPIDEMIA LDL GOAL <100: ICD-10-CM

## 2023-01-18 DIAGNOSIS — E03.9 HYPOTHYROIDISM, UNSPECIFIED TYPE: ICD-10-CM

## 2023-01-18 DIAGNOSIS — K57.32 DIVERTICULITIS OF COLON: ICD-10-CM

## 2023-01-18 DIAGNOSIS — E11.43 TYPE 2 DIABETES MELLITUS WITH DIABETIC AUTONOMIC NEUROPATHY, WITHOUT LONG-TERM CURRENT USE OF INSULIN (H): ICD-10-CM

## 2023-01-18 LAB
ALBUMIN SERPL-MCNC: 3.4 G/DL (ref 3.4–5)
ALP SERPL-CCNC: 113 U/L (ref 40–150)
ALT SERPL W P-5'-P-CCNC: 65 U/L (ref 0–70)
ANION GAP SERPL CALCULATED.3IONS-SCNC: 9 MMOL/L (ref 3–14)
AST SERPL W P-5'-P-CCNC: 47 U/L (ref 0–45)
BASOPHILS # BLD AUTO: 0 10E3/UL (ref 0–0.2)
BASOPHILS NFR BLD AUTO: 1 %
BILIRUB SERPL-MCNC: 1.7 MG/DL (ref 0.2–1.3)
BUN SERPL-MCNC: 21 MG/DL (ref 7–30)
CALCIUM SERPL-MCNC: 8.8 MG/DL (ref 8.5–10.1)
CHLORIDE BLD-SCNC: 106 MMOL/L (ref 94–109)
CHOLEST SERPL-MCNC: 144 MG/DL
CO2 SERPL-SCNC: 27 MMOL/L (ref 20–32)
CREAT SERPL-MCNC: 1.06 MG/DL (ref 0.66–1.25)
EOSINOPHIL # BLD AUTO: 0.1 10E3/UL (ref 0–0.7)
EOSINOPHIL NFR BLD AUTO: 2 %
ERYTHROCYTE [DISTWIDTH] IN BLOOD BY AUTOMATED COUNT: 13.7 % (ref 10–15)
FASTING STATUS PATIENT QL REPORTED: ABNORMAL
GFR SERPL CREATININE-BSD FRML MDRD: 75 ML/MIN/1.73M2
GLUCOSE BLD-MCNC: 225 MG/DL (ref 70–99)
HBA1C MFR BLD: 7 % (ref 0–5.6)
HCT VFR BLD AUTO: 39.6 % (ref 40–53)
HDLC SERPL-MCNC: 60 MG/DL
HGB BLD-MCNC: 12.7 G/DL (ref 13.3–17.7)
IMM GRANULOCYTES # BLD: 0 10E3/UL
IMM GRANULOCYTES NFR BLD: 0 %
LDLC SERPL CALC-MCNC: 54 MG/DL
LYMPHOCYTES # BLD AUTO: 0.6 10E3/UL (ref 0.8–5.3)
LYMPHOCYTES NFR BLD AUTO: 17 %
MCH RBC QN AUTO: 31.5 PG (ref 26.5–33)
MCHC RBC AUTO-ENTMCNC: 32.1 G/DL (ref 31.5–36.5)
MCV RBC AUTO: 98 FL (ref 78–100)
MONOCYTES # BLD AUTO: 0.3 10E3/UL (ref 0–1.3)
MONOCYTES NFR BLD AUTO: 7 %
NEUTROPHILS # BLD AUTO: 2.6 10E3/UL (ref 1.6–8.3)
NEUTROPHILS NFR BLD AUTO: 73 %
NONHDLC SERPL-MCNC: 84 MG/DL
NRBC # BLD AUTO: 0 10E3/UL
NRBC BLD AUTO-RTO: 0 /100
PLATELET # BLD AUTO: 56 10E3/UL (ref 150–450)
POTASSIUM BLD-SCNC: 4.1 MMOL/L (ref 3.4–5.3)
PROT SERPL-MCNC: 7.3 G/DL (ref 6.8–8.8)
RBC # BLD AUTO: 4.03 10E6/UL (ref 4.4–5.9)
SODIUM SERPL-SCNC: 142 MMOL/L (ref 133–144)
T4 FREE SERPL-MCNC: 1.02 NG/DL (ref 0.76–1.46)
TRIGL SERPL-MCNC: 150 MG/DL
TSH SERPL DL<=0.005 MIU/L-ACNC: 4.14 MU/L (ref 0.4–4)
WBC # BLD AUTO: 3.6 10E3/UL (ref 4–11)

## 2023-01-18 PROCEDURE — 84443 ASSAY THYROID STIM HORMONE: CPT | Performed by: FAMILY MEDICINE

## 2023-01-18 PROCEDURE — 80053 COMPREHEN METABOLIC PANEL: CPT | Performed by: FAMILY MEDICINE

## 2023-01-18 PROCEDURE — 36415 COLL VENOUS BLD VENIPUNCTURE: CPT | Performed by: FAMILY MEDICINE

## 2023-01-18 PROCEDURE — 80061 LIPID PANEL: CPT | Performed by: FAMILY MEDICINE

## 2023-01-18 PROCEDURE — 84439 ASSAY OF FREE THYROXINE: CPT | Performed by: FAMILY MEDICINE

## 2023-01-18 PROCEDURE — 85025 COMPLETE CBC W/AUTO DIFF WBC: CPT | Performed by: FAMILY MEDICINE

## 2023-01-18 PROCEDURE — 99214 OFFICE O/P EST MOD 30 MIN: CPT | Performed by: FAMILY MEDICINE

## 2023-01-18 PROCEDURE — 83036 HEMOGLOBIN GLYCOSYLATED A1C: CPT | Performed by: FAMILY MEDICINE

## 2023-01-18 RX ORDER — SPIRONOLACTONE 25 MG/1
25 TABLET ORAL DAILY
Qty: 90 TABLET | Refills: 1 | Status: SHIPPED | OUTPATIENT
Start: 2023-01-18 | End: 2023-06-19

## 2023-01-18 RX ORDER — TRAMADOL HYDROCHLORIDE 50 MG/1
50 TABLET ORAL EVERY 6 HOURS PRN
Qty: 20 TABLET | Refills: 0 | Status: ON HOLD | OUTPATIENT
Start: 2023-01-18 | End: 2023-02-23

## 2023-01-18 RX ORDER — ATORVASTATIN CALCIUM 10 MG/1
10 TABLET, FILM COATED ORAL DAILY
Qty: 90 TABLET | Refills: 1 | Status: SHIPPED | OUTPATIENT
Start: 2023-01-18 | End: 2023-06-19

## 2023-01-18 RX ORDER — LEVOTHYROXINE SODIUM 25 UG/1
25 TABLET ORAL DAILY
Qty: 90 TABLET | Refills: 1 | Status: SHIPPED | OUTPATIENT
Start: 2023-01-18 | End: 2023-06-19

## 2023-01-18 ASSESSMENT — PAIN SCALES - GENERAL: PAINLEVEL: MODERATE PAIN (5)

## 2023-01-18 NOTE — TELEPHONE ENCOUNTER
[facility] ([facility]) called asking to clarify patient's new Semaglutide- Weight management medication.    Semaglutide currently states to take inject 0.25 mg subcutaneous every 7 days. Pharmacy would like to clarify how long patient should take 0.25 mg dose before moving to 0.5 mg dose and frequency of each.    Routing to PCP to advise.    ABI Szymanski RN  [facility]

## 2023-01-18 NOTE — PATIENT INSTRUCTIONS
Lotion on facial skin    No scratching    Calamine okay    We will send you lab results    Talk to pharmacy, find out how much the Wegovy/ semaglutide is     Keep working on healthy diet/exercise and wt loss

## 2023-01-18 NOTE — TELEPHONE ENCOUNTER
One month at .25 mg weekly dose, then a refill for a month at 0.5 mg weekly dose.  Patient will go to pharmacy to find out how much this will cost.    Please call pharmacy.    Richi Jaramillo MD

## 2023-01-18 NOTE — LETTER
January 19, 2023    Canelo Logan  [address] [place] [address]          Dear ,    We are writing to inform you of your test results.  The TSH is a bit off but the free T4 is normal, so stay on same dose of thyroid medication.;     Diabetes test ( hemoglobin a1c ) is okay.     Other labs are okay/ stable.       Resulted Orders   Hemoglobin A1c   Result Value Ref Range    Hemoglobin A1C 7.0 (H) 0.0 - 5.6 %      Comment:      Normal <5.7%   Prediabetes 5.7-6.4%    Diabetes 6.5% or higher     Note: Adopted from ADA consensus guidelines.   Comprehensive metabolic panel   Result Value Ref Range    Sodium 142 133 - 144 mmol/L    Potassium 4.1 3.4 - 5.3 mmol/L    Chloride 106 94 - 109 mmol/L    Carbon Dioxide (CO2) 27 20 - 32 mmol/L    Anion Gap 9 3 - 14 mmol/L    Urea Nitrogen 21 7 - 30 mg/dL    Creatinine 1.06 0.66 - 1.25 mg/dL    Calcium 8.8 8.5 - 10.1 mg/dL    Glucose 225 (H) 70 - 99 mg/dL    Alkaline Phosphatase 113 40 - 150 U/L    AST 47 (H) 0 - 45 U/L    ALT 65 0 - 70 U/L    Protein Total 7.3 6.8 - 8.8 g/dL    Albumin 3.4 3.4 - 5.0 g/dL    Bilirubin Total 1.7 (H) 0.2 - 1.3 mg/dL    GFR Estimate 75 >60 mL/min/1.73m2      Comment:      Effective December 21, 2021 eGFRcr in adults is calculated using the 2021 CKD-EPI creatinine equation which includes age and gender (Hugh harp al., NEJ, DOI: 10.1056/UNRDof3382449)   Lipid panel reflex to direct LDL Non-fasting   Result Value Ref Range    Cholesterol 144 <200 mg/dL    Triglycerides 150 (H) <150 mg/dL    Direct Measure HDL 60 >=40 mg/dL    LDL Cholesterol Calculated 54 <=100 mg/dL    Non HDL Cholesterol 84 <130 mg/dL    Patient Fasting > 8hrs? Unknown     Narrative    Cholesterol  Desirable:  <200 mg/dL    Triglycerides  Normal:  Less than 150 mg/dL  Borderline High:  150-199 mg/dL  High:  200-499 mg/dL  Very High:  Greater than or equal to 500 mg/dL    Direct Measure HDL  Female:  Greater than or equal to 50 mg/dL   Male:  Greater than or equal to  40 mg/dL    LDL Cholesterol  Desirable:  <100mg/dL  Above Desirable:  100-129 mg/dL   Borderline High:  130-159 mg/dL   High:  160-189 mg/dL   Very High:  >= 190 mg/dL    Non HDL Cholesterol  Desirable:  130 mg/dL  Above Desirable:  130-159 mg/dL  Borderline High:  160-189 mg/dL  High:  190-219 mg/dL  Very High:  Greater than or equal to 220 mg/dL   TSH   Result Value Ref Range    TSH 4.14 (H) 0.40 - 4.00 mU/L   T4 free   Result Value Ref Range    Free T4 1.02 0.76 - 1.46 ng/dL   CBC with platelets and differential   Result Value Ref Range    WBC Count 3.6 (L) 4.0 - 11.0 10e3/uL    RBC Count 4.03 (L) 4.40 - 5.90 10e6/uL    Hemoglobin 12.7 (L) 13.3 - 17.7 g/dL    Hematocrit 39.6 (L) 40.0 - 53.0 %    MCV 98 78 - 100 fL    MCH 31.5 26.5 - 33.0 pg    MCHC 32.1 31.5 - 36.5 g/dL    RDW 13.7 10.0 - 15.0 %    Platelet Count 56 (L) 150 - 450 10e3/uL    % Neutrophils 73 %    % Lymphocytes 17 %    % Monocytes 7 %    % Eosinophils 2 %    % Basophils 1 %    % Immature Granulocytes 0 %    NRBCs per 100 WBC 0 <1 /100    Absolute Neutrophils 2.6 1.6 - 8.3 10e3/uL    Absolute Lymphocytes 0.6 (L) 0.8 - 5.3 10e3/uL    Absolute Monocytes 0.3 0.0 - 1.3 10e3/uL    Absolute Eosinophils 0.1 0.0 - 0.7 10e3/uL    Absolute Basophils 0.0 0.0 - 0.2 10e3/uL    Absolute Immature Granulocytes 0.0 <=0.4 10e3/uL    Absolute NRBCs 0.0 10e3/uL       If you have any questions or concerns, please call the clinic at the number listed above.       Sincerely,      Richi Jaramillo MD

## 2023-01-18 NOTE — PROGRESS NOTES
"Lakhwinder Lemos is a 72 year old, presenting for the following health issues:  Derm Problem (/) and Shoulder Pain (/)      Shoulder Pain    History of Present Illness       Reason for visit:  Rash and Right shoulder pain    He eats 0-1 servings of fruits and vegetables daily.He consumes 0 sweetened beverage(s) daily.He exercises with enough effort to increase his heart rate 9 or less minutes per day.  He exercises with enough effort to increase his heart rate 3 or less days per week.   He is taking medications regularly.        Review of Systems   Patient was scrubbing face skin hard    Got rash/ itching on face    Not using any lotion or creams now    Used some alcohol rub to scrub    Staying same    Almost a week now    Patient scrubbed the seborrheic keratoses up     A while ago arm/ shoulder right was bothering    Was doing a low of tasks involving arms    History of rotator cuff repair    Right wrist bothering also    [place]    Shoulders started to hurt after hours of driving    To [place] and back    Blood pressure better          Objective    /60 (BP Location: Left arm, Patient Position: Chair, Cuff Size: Adult Large)   Pulse 70   Temp 97.6  F (36.4  C) (Temporal)   Resp 14   Ht 1.803 m (5' 11\")   Wt 123 kg (271 lb 2 oz)   SpO2 96%   BMI 37.81 kg/m    Body mass index is 37.81 kg/m .  Physical Exam  Constitutional:       Appearance: He is well-developed.   HENT:      Head: Normocephalic and atraumatic.   Eyes:      Conjunctiva/sclera: Conjunctivae normal.   Neck:      Vascular: No carotid bruit.   Cardiovascular:      Rate and Rhythm: Normal rate and regular rhythm.      Heart sounds: Normal heart sounds.   Pulmonary:      Effort: Pulmonary effort is normal. No respiratory distress.      Breath sounds: Normal breath sounds.   Neurological:      Mental Status: He is alert and oriented to person, place, and time.      Cranial Nerves: No cranial nerve deficit. "   Psychiatric:         Speech: Speech normal.         Behavior: Behavior normal.        Limited range of motion of shoulder right    Some tenderness anterior and lateral    Some impingement present         ASSESSMENT / PLAN:  (E66.9) Obesity, unspecified classification, unspecified obesity type, unspecified whether serious comorbidity present  (primary encounter diagnosis)  Comment: discussed in detail with patient.  He has both obesity and diabetes.  He wanted to try med.  He will check with pharmacy to see how much it is.   Plan: Semaglutide-Weight Management 0.5 MG/0.5ML SOAJ             (E03.9) Hypothyroidism, unspecified type  Comment: refill med but also check labs   Plan: levothyroxine (SYNTHROID/LEVOTHROID) 25 MCG         tablet, TSH, T4 free             (E11.43) Type 2 diabetes mellitus with diabetic autonomic neuropathy, without long-term current use of insulin (H)  Comment: refill med, start new med if doable  Plan: Semaglutide-Weight Management 0.5 MG/0.5ML         SOAJ, metFORMIN (GLUCOPHAGE) 500 MG tablet,         Hemoglobin A1c             (K21.9) Gastroesophageal reflux disease without esophagitis  Comment: refill   Plan: omeprazole (PRILOSEC) 20 MG DR capsule        Stable     (I10) Hypertension goal BP (blood pressure) < 140/90  Comment: blood pressure okay   Plan: spironolactone (ALDACTONE) 25 MG tablet        Refill     (E78.5) Hyperlipidemia LDL goal <100  Comment:  Refill   Plan: atorvastatin (LIPITOR) 10 MG tablet,         Comprehensive metabolic panel, Lipid panel         reflex to direct LDL Non-fasting             (R53.83) Fatigue, unspecified type  Comment: check   Plan: CBC with Platelets & Differential             (K57.32) Diverticulitis of colon  Comment: patient uses prn, works well no side effects    Plan: traMADol (ULTRAM) 50 MG tablet             (M25.511) Right shoulder pain, unspecified chronicity  Comment:    Plan: patient to see orthopedics for this       I reviewed the  patient's medications, allergies, medical history, family history, and social history.    Richi Jaramillo MD

## 2023-01-18 NOTE — TELEPHONE ENCOUNTER
Spoke to [facility] and informed of clarification as written by provider.    PETER JonesN RN  [facility]

## 2023-01-19 NOTE — RESULT ENCOUNTER NOTE
The TSH is a bit off but the free T4 is normal, so stay on same dose of thyroid medication.;    Diabetes test ( hemoglobin a1c ) is okay.    Other labs are okay/ stable.    Richi Jaramillo MD   Acuity of illness

## 2023-01-23 ENCOUNTER — TELEPHONE (OUTPATIENT)
Dept: FAMILY MEDICINE | Facility: CLINIC | Age: 73
End: 2023-01-23
Payer: COMMERCIAL

## 2023-01-23 NOTE — TELEPHONE ENCOUNTER
Routing refill request to provider for review/approval because:  Drug not active on patient's medication list    Last prescribed 9/13/2017    Katie Nair RN  [facility]

## 2023-01-24 NOTE — TELEPHONE ENCOUNTER
Please notify patient and schedule a phone visit    Sigifredo Hinds RN  [facility]

## 2023-01-24 NOTE — TELEPHONE ENCOUNTER
Advise phone visit to go over this with patient     We would have to stop the gabapentin before starting pregabalin, and the latter med may not be covered    Please inform patient    Richi Jaramillo MD

## 2023-01-25 ENCOUNTER — VIRTUAL VISIT (OUTPATIENT)
Dept: FAMILY MEDICINE | Facility: CLINIC | Age: 73
End: 2023-01-25
Payer: COMMERCIAL

## 2023-01-25 DIAGNOSIS — M25.519 SHOULDER PAIN, UNSPECIFIED CHRONICITY, UNSPECIFIED LATERALITY: ICD-10-CM

## 2023-01-25 DIAGNOSIS — M79.2 NERVE PAIN: Primary | ICD-10-CM

## 2023-01-25 DIAGNOSIS — R26.89 BALANCE PROBLEMS: ICD-10-CM

## 2023-01-25 PROCEDURE — 99443 PR PHYSICIAN TELEPHONE EVALUATION 21-30 MIN: CPT | Mod: 95 | Performed by: FAMILY MEDICINE

## 2023-01-25 RX ORDER — PREGABALIN 25 MG/1
CAPSULE ORAL
Qty: 60 CAPSULE | Refills: 1 | Status: ON HOLD | OUTPATIENT
Start: 2023-01-25 | End: 2023-02-23

## 2023-01-25 NOTE — PROGRESS NOTES
Canelo is a 72 year old who is being evaluated via a billable telephone visit.      What phone number would you like to be contacted at? [phone]  How would you like to obtain your AVS? Mail a copy   Distant Location (provider location):  On-site         Subjective   Canelo is a 72 year old, presenting for the following health issues:  Recheck Medication      History of Present Illness       Reason for visit:  Rash and Right shoulder pain    He eats 0-1 servings of fruits and vegetables daily.He consumes 0 sweetened beverage(s) daily.He exercises with enough effort to increase his heart rate 9 or less minutes per day.  He exercises with enough effort to increase his heart rate 3 or less days per week.   He is taking medications regularly.          Review of Systems      Started the semaglutide on Monday     Tramadol is taken for orthopedic pain    Gabapentin helps some but diarrhea  For nerve damage in shoulders          Objective           Vitals:  No vitals were obtained today due to virtual visit.    Physical Exam   healthy, alert and no distress  PSYCH: Alert and oriented times 3; coherent speech, normal   rate and volume, able to articulate logical thoughts, able   to abstract reason, no tangential thoughts, no hallucinations   or delusions  His affect is normal  RESP: No cough, no audible wheezing, able to talk in full sentences  Remainder of exam unable to be completed due to telephone visits         ASSESSMENT / PLAN:  (M79.2) Nerve pain  (primary encounter diagnosis)  Comment: warned patient of side effects ( looked up on up to date ) but did send in prescription for this.  Use instead of gabapentin and just sparingly  Plan: pregabalin (LYRICA) 25 MG capsule             (M25.519) Shoulder pain, unspecified chronicity, unspecified laterality  Comment: patient to see orthopedics   Plan: as above     (R26.89) Balance problems  Comment: multifactorial   Plan: discussed    We went over recent labs in great  detail       I reviewed the patient's medications, allergies, medical history, family history, and social history.    Richi Jaramillo MD            Phone call duration: 21 minutes ( 3:51 to 4:12 pm )

## 2023-02-08 ENCOUNTER — TELEPHONE (OUTPATIENT)
Dept: FAMILY MEDICINE | Facility: CLINIC | Age: 73
End: 2023-02-08
Payer: COMMERCIAL

## 2023-02-08 NOTE — TELEPHONE ENCOUNTER
Patient Quality Outreach    Patient is due for the following:   Chronic Opioid Use -  Treatment Agreement (CSA)    Next Steps:   diagnosis added to patients list. Will get contract at next in clinic visit per Dr. Jaramillo    Type of outreach:    see above notes    Next Steps:  Reach out within 90 days via NA.    Max number of attempts reached: Yes. Will try again in 90 days if patient still on fail list.    Questions for provider review:    None     Sofia Conde, MELISSA  Chart routed to chart closed.

## 2023-02-20 ENCOUNTER — APPOINTMENT (OUTPATIENT)
Dept: CT IMAGING | Facility: CLINIC | Age: 73
End: 2023-02-20
Attending: EMERGENCY MEDICINE
Payer: COMMERCIAL

## 2023-02-20 ENCOUNTER — HOSPITAL ENCOUNTER (OUTPATIENT)
Facility: CLINIC | Age: 73
Setting detail: OBSERVATION
Discharge: NURSING, REHAB OR LONG-TERM CARE | End: 2023-02-23
Attending: EMERGENCY MEDICINE | Admitting: HOSPITALIST
Payer: COMMERCIAL

## 2023-02-20 DIAGNOSIS — S22.050D COMPRESSION FRACTURE OF T6 VERTEBRA WITH ROUTINE HEALING: ICD-10-CM

## 2023-02-20 DIAGNOSIS — R55 SYNCOPE, UNSPECIFIED SYNCOPE TYPE: ICD-10-CM

## 2023-02-20 DIAGNOSIS — E11.43 TYPE 2 DIABETES MELLITUS WITH DIABETIC AUTONOMIC NEUROPATHY, WITHOUT LONG-TERM CURRENT USE OF INSULIN (H): ICD-10-CM

## 2023-02-20 DIAGNOSIS — M79.2 NERVE PAIN: ICD-10-CM
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
## 2023-02-20 DIAGNOSIS — K59.00 CONSTIPATION, UNSPECIFIED CONSTIPATION TYPE: ICD-10-CM

## 2023-02-20 DIAGNOSIS — S22.059A CLOSED FRACTURE OF FIFTH THORACIC VERTEBRA, UNSPECIFIED FRACTURE MORPHOLOGY, INITIAL ENCOUNTER (H): ICD-10-CM

## 2023-02-20 DIAGNOSIS — S00.03XA CONTUSION OF SCALP, INITIAL ENCOUNTER: ICD-10-CM

## 2023-02-20 DIAGNOSIS — S22.059D CLOSED FRACTURE OF FIFTH THORACIC VERTEBRA WITH ROUTINE HEALING, UNSPECIFIED FRACTURE MORPHOLOGY, SUBSEQUENT ENCOUNTER: ICD-10-CM

## 2023-02-20 DIAGNOSIS — S22.059A CLOSED FRACTURE OF SIXTH THORACIC VERTEBRA, UNSPECIFIED FRACTURE MORPHOLOGY, INITIAL ENCOUNTER (H): ICD-10-CM

## 2023-02-20 DIAGNOSIS — E66.9 OBESITY, UNSPECIFIED CLASSIFICATION, UNSPECIFIED OBESITY TYPE, UNSPECIFIED WHETHER SERIOUS COMORBIDITY PRESENT: ICD-10-CM

## 2023-02-20 PROBLEM — K74.69 OTHER CIRRHOSIS OF LIVER (H): Chronic | Status: ACTIVE | Noted: 2020-02-20
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
## 2023-02-20 PROBLEM — I81 PORTAL VEIN THROMBOSIS: Status: ACTIVE | Noted: 2020-07-09

## 2023-02-20 PROBLEM — E11.9 NON-INSULIN DEPENDENT TYPE 2 DIABETES MELLITUS (H): Status: ACTIVE | Noted: 2020-02-20

## 2023-02-20 LAB
ALBUMIN SERPL BCG-MCNC: 4 G/DL (ref 3.5–5.2)
ALP SERPL-CCNC: 111 U/L (ref 40–129)
ALT SERPL W P-5'-P-CCNC: 55 U/L (ref 10–50)
ANION GAP SERPL CALCULATED.3IONS-SCNC: 11 MMOL/L (ref 7–15)
APTT PPP: 27 SECONDS (ref 22–38)
AST SERPL W P-5'-P-CCNC: ABNORMAL U/L
BASOPHILS # BLD AUTO: 0 10E3/UL (ref 0–0.2)
BASOPHILS NFR BLD AUTO: 0 %
BILIRUB SERPL-MCNC: 1.4 MG/DL
BUN SERPL-MCNC: 27.1 MG/DL (ref 8–23)
CALCIUM SERPL-MCNC: 9.3 MG/DL (ref 8.8–10.2)
CHLORIDE SERPL-SCNC: 102 MMOL/L (ref 98–107)
CREAT SERPL-MCNC: 1.16 MG/DL (ref 0.67–1.17)
DEPRECATED HCO3 PLAS-SCNC: 26 MMOL/L (ref 22–29)
EOSINOPHIL # BLD AUTO: 0.1 10E3/UL (ref 0–0.7)
EOSINOPHIL NFR BLD AUTO: 1 %
ERYTHROCYTE [DISTWIDTH] IN BLOOD BY AUTOMATED COUNT: 13.3 % (ref 10–15)
GFR SERPL CREATININE-BSD FRML MDRD: 67 ML/MIN/1.73M2
GLUCOSE BLDC GLUCOMTR-MCNC: 196 MG/DL (ref 70–99)
GLUCOSE SERPL-MCNC: 135 MG/DL (ref 70–99)
HCT VFR BLD AUTO: 41.6 % (ref 40–53)
HGB BLD-MCNC: 13.7 G/DL (ref 13.3–17.7)
IMM GRANULOCYTES # BLD: 0 10E3/UL
IMM GRANULOCYTES NFR BLD: 0 %
INR PPP: 1.12 (ref 0.85–1.15)
LYMPHOCYTES # BLD AUTO: 0.7 10E3/UL (ref 0.8–5.3)
LYMPHOCYTES NFR BLD AUTO: 9 %
MAGNESIUM SERPL-MCNC: 1.7 MG/DL (ref 1.7–2.3)
MCH RBC QN AUTO: 31.9 PG (ref 26.5–33)
MCHC RBC AUTO-ENTMCNC: 32.9 G/DL (ref 31.5–36.5)
MCV RBC AUTO: 97 FL (ref 78–100)
MONOCYTES # BLD AUTO: 0.5 10E3/UL (ref 0–1.3)
MONOCYTES NFR BLD AUTO: 7 %
NEUTROPHILS # BLD AUTO: 6.1 10E3/UL (ref 1.6–8.3)
NEUTROPHILS NFR BLD AUTO: 83 %
NRBC # BLD AUTO: 0 10E3/UL
NRBC BLD AUTO-RTO: 0 /100
PLATELET # BLD AUTO: 77 10E3/UL (ref 150–450)
POTASSIUM SERPL-SCNC: 5.2 MMOL/L (ref 3.4–5.3)
PROT SERPL-MCNC: 7.4 G/DL (ref 6.4–8.3)
RBC # BLD AUTO: 4.29 10E6/UL (ref 4.4–5.9)
SODIUM SERPL-SCNC: 139 MMOL/L (ref 136–145)
WBC # BLD AUTO: 7.4 10E3/UL (ref 4–11)

## 2023-02-20 PROCEDURE — 72131 CT LUMBAR SPINE W/O DYE: CPT

## 2023-02-20 PROCEDURE — 99285 EMERGENCY DEPT VISIT HI MDM: CPT | Performed by: EMERGENCY MEDICINE

## 2023-02-20 PROCEDURE — 72128 CT CHEST SPINE W/O DYE: CPT

## 2023-02-20 PROCEDURE — 83735 ASSAY OF MAGNESIUM: CPT | Performed by: NURSE PRACTITIONER

## 2023-02-20 PROCEDURE — 99285 EMERGENCY DEPT VISIT HI MDM: CPT | Mod: 25 | Performed by: EMERGENCY MEDICINE

## 2023-02-20 PROCEDURE — 96374 THER/PROPH/DIAG INJ IV PUSH: CPT | Performed by: EMERGENCY MEDICINE

## 2023-02-20 PROCEDURE — G0378 HOSPITAL OBSERVATION PER HR: HCPCS

## 2023-02-20 PROCEDURE — 70450 CT HEAD/BRAIN W/O DYE: CPT

## 2023-02-20 PROCEDURE — 85610 PROTHROMBIN TIME: CPT | Performed by: EMERGENCY MEDICINE

## 2023-02-20 PROCEDURE — 85025 COMPLETE CBC W/AUTO DIFF WBC: CPT | Performed by: EMERGENCY MEDICINE

## 2023-02-20 PROCEDURE — 71250 CT THORAX DX C-: CPT

## 2023-02-20 PROCEDURE — 250N000011 HC RX IP 250 OP 636: Performed by: EMERGENCY MEDICINE

## 2023-02-20 PROCEDURE — 96376 TX/PRO/DX INJ SAME DRUG ADON: CPT | Performed by: EMERGENCY MEDICINE

## 2023-02-20 PROCEDURE — 84155 ASSAY OF PROTEIN SERUM: CPT | Performed by: EMERGENCY MEDICINE

## 2023-02-20 PROCEDURE — 85730 THROMBOPLASTIN TIME PARTIAL: CPT | Performed by: EMERGENCY MEDICINE

## 2023-02-20 PROCEDURE — 82962 GLUCOSE BLOOD TEST: CPT

## 2023-02-20 PROCEDURE — 250N000013 HC RX MED GY IP 250 OP 250 PS 637: Performed by: NURSE PRACTITIONER

## 2023-02-20 PROCEDURE — 72125 CT NECK SPINE W/O DYE: CPT

## 2023-02-20 PROCEDURE — 36415 COLL VENOUS BLD VENIPUNCTURE: CPT | Performed by: EMERGENCY MEDICINE

## 2023-02-20 RX ORDER — MULTIPLE VITAMINS W/ MINERALS TAB 9MG-400MCG
1 TAB ORAL DAILY
Status: DISCONTINUED | OUTPATIENT
Start: 2023-02-21 | End: 2023-02-23 | Stop reason: HOSPADM

## 2023-02-20 RX ORDER — OXYCODONE HYDROCHLORIDE 5 MG/1
5 TABLET ORAL EVERY 4 HOURS PRN
Status: DISCONTINUED | OUTPATIENT
Start: 2023-02-20 | End: 2023-02-22

## 2023-02-20 RX ORDER — POLYETHYLENE GLYCOL 3350 17 G/17G
17 POWDER, FOR SOLUTION ORAL DAILY PRN
Status: DISCONTINUED | OUTPATIENT
Start: 2023-02-20 | End: 2023-02-23 | Stop reason: HOSPADM

## 2023-02-20 RX ORDER — NALOXONE HYDROCHLORIDE 0.4 MG/ML
0.4 INJECTION, SOLUTION INTRAMUSCULAR; INTRAVENOUS; SUBCUTANEOUS
Status: DISCONTINUED | OUTPATIENT
Start: 2023-02-20 | End: 2023-02-23 | Stop reason: HOSPADM

## 2023-02-20 RX ORDER — NALOXONE HYDROCHLORIDE 0.4 MG/ML
0.2 INJECTION, SOLUTION INTRAMUSCULAR; INTRAVENOUS; SUBCUTANEOUS
Status: DISCONTINUED | OUTPATIENT
Start: 2023-02-20 | End: 2023-02-23 | Stop reason: HOSPADM

## 2023-02-20 RX ORDER — LEVOTHYROXINE SODIUM 25 UG/1
25 TABLET ORAL
Status: DISCONTINUED | OUTPATIENT
Start: 2023-02-21 | End: 2023-02-23 | Stop reason: HOSPADM

## 2023-02-20 RX ORDER — METOPROLOL SUCCINATE 100 MG/1
100 TABLET, EXTENDED RELEASE ORAL DAILY
Status: DISCONTINUED | OUTPATIENT
Start: 2023-02-21 | End: 2023-02-23 | Stop reason: HOSPADM

## 2023-02-20 RX ORDER — VALSARTAN 80 MG/1
160 TABLET ORAL DAILY
Status: DISCONTINUED | OUTPATIENT
Start: 2023-02-21 | End: 2023-02-23 | Stop reason: HOSPADM

## 2023-02-20 RX ORDER — NICOTINE POLACRILEX 4 MG
15-30 LOZENGE BUCCAL
Status: DISCONTINUED | OUTPATIENT
Start: 2023-02-20 | End: 2023-02-23 | Stop reason: HOSPADM

## 2023-02-20 RX ORDER — HYDROMORPHONE HYDROCHLORIDE 1 MG/ML
0.5 INJECTION, SOLUTION INTRAMUSCULAR; INTRAVENOUS; SUBCUTANEOUS ONCE
Status: COMPLETED | OUTPATIENT
Start: 2023-02-20 | End: 2023-02-20

## 2023-02-20 RX ORDER — MAGNESIUM OXIDE 400 MG/1
400 TABLET ORAL EVERY 4 HOURS
Status: COMPLETED | OUTPATIENT
Start: 2023-02-20 | End: 2023-02-21

## 2023-02-20 RX ORDER — PROCHLORPERAZINE MALEATE 5 MG
5 TABLET ORAL EVERY 6 HOURS PRN
Status: DISCONTINUED | OUTPATIENT
Start: 2023-02-20 | End: 2023-02-23 | Stop reason: HOSPADM

## 2023-02-20 RX ORDER — HYDROMORPHONE HCL IN WATER/PF 6 MG/30 ML
0.2 PATIENT CONTROLLED ANALGESIA SYRINGE INTRAVENOUS
Status: DISCONTINUED | OUTPATIENT
Start: 2023-02-20 | End: 2023-02-20

## 2023-02-20 RX ORDER — DEXTROSE MONOHYDRATE 25 G/50ML
25-50 INJECTION, SOLUTION INTRAVENOUS
Status: DISCONTINUED | OUTPATIENT
Start: 2023-02-20 | End: 2023-02-23 | Stop reason: HOSPADM

## 2023-02-20 RX ORDER — ONDANSETRON 4 MG/1
4 TABLET, ORALLY DISINTEGRATING ORAL EVERY 6 HOURS PRN
Status: DISCONTINUED | OUTPATIENT
Start: 2023-02-20 | End: 2023-02-23 | Stop reason: HOSPADM

## 2023-02-20 RX ORDER — ATORVASTATIN CALCIUM 10 MG/1
10 TABLET, FILM COATED ORAL DAILY
Status: DISCONTINUED | OUTPATIENT
Start: 2023-02-21 | End: 2023-02-23 | Stop reason: HOSPADM

## 2023-02-20 RX ORDER — PREGABALIN 25 MG/1
25 CAPSULE ORAL 3 TIMES DAILY
Status: DISCONTINUED | OUTPATIENT
Start: 2023-02-20 | End: 2023-02-23 | Stop reason: HOSPADM

## 2023-02-20 RX ORDER — LIDOCAINE 40 MG/G
CREAM TOPICAL
Status: DISCONTINUED | OUTPATIENT
Start: 2023-02-20 | End: 2023-02-23 | Stop reason: HOSPADM

## 2023-02-20 RX ORDER — ONDANSETRON 2 MG/ML
4 INJECTION INTRAMUSCULAR; INTRAVENOUS EVERY 6 HOURS PRN
Status: DISCONTINUED | OUTPATIENT
Start: 2023-02-20 | End: 2023-02-23 | Stop reason: HOSPADM

## 2023-02-20 RX ORDER — PROCHLORPERAZINE 25 MG
12.5 SUPPOSITORY, RECTAL RECTAL EVERY 12 HOURS PRN
Status: DISCONTINUED | OUTPATIENT
Start: 2023-02-20 | End: 2023-02-23 | Stop reason: HOSPADM

## 2023-02-20 RX ORDER — VITAMIN B COMPLEX
25 TABLET ORAL DAILY
Status: DISCONTINUED | OUTPATIENT
Start: 2023-02-21 | End: 2023-02-23 | Stop reason: HOSPADM

## 2023-02-20 RX ORDER — SPIRONOLACTONE 25 MG/1
25 TABLET ORAL DAILY
Status: DISCONTINUED | OUTPATIENT
Start: 2023-02-21 | End: 2023-02-23 | Stop reason: HOSPADM

## 2023-02-20 RX ORDER — PANTOPRAZOLE SODIUM 40 MG/1
40 TABLET, DELAYED RELEASE ORAL
Status: DISCONTINUED | OUTPATIENT
Start: 2023-02-21 | End: 2023-02-23 | Stop reason: HOSPADM

## 2023-02-20 RX ORDER — HYDROCHLOROTHIAZIDE 25 MG/1
50 TABLET ORAL DAILY
Status: DISCONTINUED | OUTPATIENT
Start: 2023-02-21 | End: 2023-02-23 | Stop reason: HOSPADM

## 2023-02-20 RX ORDER — TAMSULOSIN HYDROCHLORIDE 0.4 MG/1
0.4 CAPSULE ORAL DAILY
Status: DISCONTINUED | OUTPATIENT
Start: 2023-02-21 | End: 2023-02-23 | Stop reason: HOSPADM

## 2023-02-20 RX ORDER — LIDOCAINE 4 G/G
2 PATCH TOPICAL
Status: DISCONTINUED | OUTPATIENT
Start: 2023-02-20 | End: 2023-02-23 | Stop reason: HOSPADM

## 2023-02-20 RX ADMIN — HYDROMORPHONE HYDROCHLORIDE 0.2 MG: 0.2 INJECTION, SOLUTION INTRAMUSCULAR; INTRAVENOUS; SUBCUTANEOUS at 15:18

## 2023-02-20 RX ADMIN — HYDROMORPHONE HYDROCHLORIDE 0.5 MG: 1 INJECTION, SOLUTION INTRAMUSCULAR; INTRAVENOUS; SUBCUTANEOUS at 19:18

## 2023-02-20 RX ADMIN — LIDOCAINE 2 PATCH: 560 PATCH PERCUTANEOUS; TOPICAL; TRANSDERMAL at 20:33

## 2023-02-20 RX ADMIN — PREGABALIN 25 MG: 25 CAPSULE ORAL at 20:31

## 2023-02-20 RX ADMIN — OXYCODONE HYDROCHLORIDE 7.5 MG: 5 TABLET ORAL at 22:25

## 2023-02-20 RX ADMIN — MAGNESIUM OXIDE TAB 400 MG (241.3 MG ELEMENTAL MG) 400 MG: 400 (241.3 MG) TAB at 22:25

## 2023-02-20 RX ADMIN — HYDROMORPHONE HYDROCHLORIDE 0.2 MG: 0.2 INJECTION, SOLUTION INTRAMUSCULAR; INTRAVENOUS; SUBCUTANEOUS at 13:19

## 2023-02-20 ASSESSMENT — ACTIVITIES OF DAILY LIVING (ADL)
ADLS_ACUITY_SCORE: 27
ADLS_ACUITY_SCORE: 35
ADLS_ACUITY_SCORE: 24
ADLS_ACUITY_SCORE: 35
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
## 2023-02-20 NOTE — PROGRESS NOTES
Appropriate assistive devices provided during their visit. N/a (Yes, No, N/A)    Exam table and/or cart  placed in the lowest position. yes (Yes, No, N/A)    Brakes on tables/carts/wheelchairs used at all times. yes (Yes, No, N/A)    Non slip footwear applied. yes (Yes, No, NA)    Patient was accompanied by staff throughout visit. yes (Yes, No, N/A)    Equipment safety straps used. yes (Yes, No, N/A)    Assist with toileting. N/a (Yes, No, N/A)

## 2023-02-20 NOTE — ED PROVIDER NOTES
History     Chief Complaint   Patient presents with     Fall     HPI  Gucci Logan is a 72 year old male who presents after a slip and fall on the ice.  He hit the back of his head.  Also with some back pain.  Initially no significant extremity injury reported.    Allergies:  Allergies   Allergen Reactions     Influenza Vaccines Other (See Comments)     delirium  fever         Problem List:    Patient Active Problem List    Diagnosis Date Noted     Compression fracture of T6 vertebra with routine healing 02/20/2023     Priority: Medium     Syncope, unspecified syncope type 02/20/2023     Priority: Medium     Fall 02/20/2023     Priority: Medium     Abdominal pain 07/09/2020     Priority: Medium     Portal vein thrombosis 07/09/2020     Priority: Medium     Non-insulin dependent type 2 diabetes mellitus (H) 02/20/2020     Priority: Medium     Other cirrhosis of liver (H) 02/20/2020     Priority: Medium     Obesity (BMI 35.0-39.9) with comorbidity (H) 11/09/2018     Priority: Medium     Diabetes mellitus, type 2 (H) 11/09/2018     Priority: Medium     Hyperlipidemia LDL goal <100 04/19/2017     Priority: Medium     Impaired fasting glucose 04/19/2017     Priority: Medium     Gastroesophageal reflux disease, esophagitis presence not specified 10/06/2016     Priority: Medium     IMO Regulatory Load OCT 2020       Obesity, unspecified obesity severity, unspecified obesity type 12/17/2015     Priority: Medium     SHONDA (obstructive sleep apnea) 02/07/2014     Priority: Medium     Hypertension goal BP (blood pressure) < 140/90 02/08/2013     Priority: Medium     Advanced directives, counseling/discussion 01/16/2013     Priority: Medium     Advance Care Planning:   ACP Review and Resources Provided:  Reviewed chart for advance care plan.  Gucci Logan has no plan or code status on file however states presence of ACP document. Copy requested. Confirmed code status reflects current choices pending receipt of  document/advance care plan review. Confirmed/documented designated decision maker(s). See permanent comments section of demographics in clinical tab.   Added by Elyssa Olguin on 7/22/2014  Patient states has Advance Directive and will bring in a copy to clinic. 1/16/2013              CARDIOVASCULAR SCREENING; LDL GOAL LESS THAN 130 01/16/2013     Priority: Medium     Vitamin D deficiency 09/19/2012     Priority: Medium     Overview:   9/19/2012       Osteoarthrosis 09/18/2012     Priority: Medium     Overview:   Lumbar spine, knees       Left ventricular hypertrophy 11/18/2011     Priority: Medium     Overview:   11/18/2011       Thrombocytopenia (H) 08/28/2008     Priority: Medium     Overview:   8/28/2008, attributed to liver disease with portal hypertension and functional splenomegaly       Splenomegaly 07/25/2008     Priority: Medium     Overview:   7/25/2009, attributed to portal hypertension from cirrhosis       Lymphadenopathy 06/20/2008     Priority: Medium     Overview:   6/20/2008 5/31/2011, CT: Increasing mediastinal and hilar lymphadenopathy and mild increase in perigastric lymph node. Findings are concerning for atypical infectious process. In the absence of pulmonary findings, with lymphomatous disorder in the differential. Clinical correlation is recommended.       Jaundice 05/31/2008     Priority: Medium     Chronic hepatitis C virus infection (H) 05/31/2008     Priority: Medium        Past Medical History:    Past Medical History:   Diagnosis Date     Arthritis      Chronic, continuous use of opioids      Esophageal reflux 03/01/2014     Hearing problem      Hiatal hernia      Hypertension      Jaundice 05/31/2008     Liver disease      Obesity 01/24/2013     Obstructive sleep apnea      Sleep apnea      Syncope, unspecified syncope type 2/20/2023       Past Surgical History:    Past Surgical History:   Procedure Laterality Date     ARTHROSCOPY KNEE RT/LT      (L) with partial medial  meniscectomy     CATARACT IOL, RT/LT  Nov and Dec 2017     COLONOSCOPY N/A 2019    Procedure: COLONOSCOPY, WITH POLYPECTOMY AND BIOPSY;  Surgeon: Leventhal, Thomas Michael, MD;  Location: [facility]     COLONOSCOPY N/A 2022    Procedure: COLONOSCOPY;  Surgeon: Rafa Renee MD;  Location: [facility]     DACRYOCYSTORHINOSTOMY Left 10/16/2018    Procedure: DACRYOCYSTORHINOSTOMY;  Surgeon: Madhu Krause MD;  Location: [facility]     ENDOSCOPIC ENDONASAL SURGERY       ENDOSCOPY  2-19-15     ESOPHAGOSCOPY, GASTROSCOPY, DUODENOSCOPY (EGD), COMBINED N/A 2019    Procedure: COMBINED ESOPHAGOSCOPY, GASTROSCOPY, DUODENOSCOPY (EGD) - hold aspirin ibuprofen or naproxen for one week prior (per physician order);  Surgeon: Leventhal, Thomas Michael, MD;  Location:  OR     EYE SURGERY       Hernia surgery Left      NASAL/SINUS POLYPECTOMY       REPAIR PTOSIS Left 10/16/2018    Procedure: LEFT UPPER LID PTOSIS AND BILATERAL  BROW PTOSIS REPAIR WITH LEFT DACRYOCYSTORHINOSTOMY ;  Surgeon: Madhu Krause MD;  Location: [facility]     REPAIR PTOSIS BROW Bilateral 10/16/2018    Procedure: REPAIR PTOSIS BROW;  Surgeon: Madhu Krause MD;  Location: [facility]     ROTATOR CUFF REPAIR RT/LT Right      ZZC OPEN RX ANKLE DISLOCATN+FIXATN      (R)     ZZC SPINAL FUSION,ANT,EA ADNL LEVEL      T12 - L1       Family History:    Family History   Problem Relation Age of Onset     Alzheimer Disease Mother 85     Dementia Mother      Cerebrovascular Disease Father 50     Cancer Father      Hypertension Father      Neurologic Disorder No family hx of      Diabetes No family hx of        Social History:  Marital Status:  Single [1]  Social History     Tobacco Use     Smoking status: Former     Packs/day: 0.00     Years: 5.00     Pack years: 0.00     Types: Cigarettes     Start date: 1990     Quit date: 1999     Years since quittin.1     Smokeless tobacco: Never   Vaping Use     Vaping Use: Never used   Substance Use  "Topics     Alcohol use: Yes     Comment: rare     Drug use: No        Medications:    No current outpatient medications on file.        Review of Systems  All other systems are reviewed and are negative    Physical Exam   BP: (!) 159/80  Pulse: 75  Temp: 98  F (36.7  C)  Resp: 18  Height: 180.3 cm (5' 11\")  Weight: 120.2 kg (265 lb)  SpO2: 99 %      Physical Exam  Constitutional:       General: He is not in acute distress.     Appearance: He is not diaphoretic.   HENT:      Head: Atraumatic.      Comments: Contusion with soft tissue swelling to the midline of the posterior scalp.  No large lacerations for repair.  No obvious crepitus or bony step-off  Eyes:      Pupils: Pupils are equal, round, and reactive to light.   Cardiovascular:      Rate and Rhythm: Regular rhythm.      Heart sounds: Normal heart sounds.   Pulmonary:      Effort: No respiratory distress.      Breath sounds: Normal breath sounds.   Chest:      Chest wall: No tenderness.   Abdominal:      General: Bowel sounds are normal.      Palpations: Abdomen is soft.      Tenderness: There is no abdominal tenderness.   Musculoskeletal:         General: Normal range of motion.      Cervical back: No tenderness.      Thoracic back: Tenderness present.      Lumbar back: Tenderness present.   Skin:     Findings: No abrasion or laceration.   Neurological:      Mental Status: He is alert and oriented to person, place, and time.         ED Course                 Procedures                  Results for orders placed or performed during the hospital encounter of 02/20/23 (from the past 24 hour(s))   CBC with platelets differential    Narrative    The following orders were created for panel order CBC with platelets differential.  Procedure                               Abnormality         Status                     ---------                               -----------         ------                     CBC with platelets and d...[[id number]]  Abnormal            Final " result                 Please view results for these tests on the individual orders.   Comprehensive metabolic panel   Result Value Ref Range    Sodium 139 136 - 145 mmol/L    Potassium 5.2 3.4 - 5.3 mmol/L    Chloride 102 98 - 107 mmol/L    Carbon Dioxide (CO2) 26 22 - 29 mmol/L    Anion Gap 11 7 - 15 mmol/L    Urea Nitrogen 27.1 (H) 8.0 - 23.0 mg/dL    Creatinine 1.16 0.67 - 1.17 mg/dL    Calcium 9.3 8.8 - 10.2 mg/dL    Glucose 135 (H) 70 - 99 mg/dL    Alkaline Phosphatase 111 40 - 129 U/L    AST      ALT 55 (H) 10 - 50 U/L    Protein Total 7.4 6.4 - 8.3 g/dL    Albumin 4.0 3.5 - 5.2 g/dL    Bilirubin Total 1.4 (H) <=1.2 mg/dL    GFR Estimate 67 >60 mL/min/1.73m2   INR   Result Value Ref Range    INR 1.12 0.85 - 1.15   Partial thromboplastin time   Result Value Ref Range    aPTT 27 22 - 38 Seconds   CBC with platelets and differential   Result Value Ref Range    WBC Count 7.4 4.0 - 11.0 10e3/uL    RBC Count 4.29 (L) 4.40 - 5.90 10e6/uL    Hemoglobin 13.7 13.3 - 17.7 g/dL    Hematocrit 41.6 40.0 - 53.0 %    MCV 97 78 - 100 fL    MCH 31.9 26.5 - 33.0 pg    MCHC 32.9 31.5 - 36.5 g/dL    RDW 13.3 10.0 - 15.0 %    Platelet Count 77 (L) 150 - 450 10e3/uL    % Neutrophils 83 %    % Lymphocytes 9 %    % Monocytes 7 %    % Eosinophils 1 %    % Basophils 0 %    % Immature Granulocytes 0 %    NRBCs per 100 WBC 0 <1 /100    Absolute Neutrophils 6.1 1.6 - 8.3 10e3/uL    Absolute Lymphocytes 0.7 (L) 0.8 - 5.3 10e3/uL    Absolute Monocytes 0.5 0.0 - 1.3 10e3/uL    Absolute Eosinophils 0.1 0.0 - 0.7 10e3/uL    Absolute Basophils 0.0 0.0 - 0.2 10e3/uL    Absolute Immature Granulocytes 0.0 <=0.4 10e3/uL    Absolute NRBCs 0.0 10e3/uL   Magnesium   Result Value Ref Range    Magnesium 1.7 1.7 - 2.3 mg/dL   CT Chest Abdomen Pelvis w/o Contrast    Narrative    CT CHEST ABDOMEN PELVIS W/O CONTRAST 2/20/2023 2:15 PM    CLINICAL HISTORY: trauma, pain    TECHNIQUE: CT scan of the chest, abdomen, and pelvis was performed  without IV  contrast. Multiplanar reformats were obtained. Dose  reduction techniques were used.   CONTRAST: None.    COMPARISON: Ultrasound 7/7/2022, CT 8/9/2021    FINDINGS:   LUNGS AND PLEURA: No focal pulmonary laceration or contusion. No  pleural effusion or pneumothorax.    MEDIASTINUM/AXILLAE: No evidence of acute injury. No suspicious  lymphadenopathy. Moderate hiatal hernia.    CORONARY ARTERY CALCIFICATION: Mild.    HEPATOBILIARY: Cirrhotic morphology of the liver without evidence of  acute injury on this noncontrast exam. Cholelithiasis without evidence  of acute cholecystitis.    PANCREAS: Normal.    SPLEEN: Splenomegaly. No perisplenic hematoma visualized.    ADRENAL GLANDS: Normal.    KIDNEYS/BLADDER: No hydronephrosis or evidence of acute injury.  Unchanged left renal cysts, no specific follow-up recommended. Urinary  bladder is unremarkable.    BOWEL: Diverticulosis in the colon. No acute inflammatory change. No  obstruction. Normal appendix. No mesenteric hematoma or  pneumoperitoneum.    LYMPH NODES: Normal.    VASCULATURE: Moderate calcified atherosclerosis. No evidence of acute  injury on this noncontrast exam. Likely calcified, chronic thrombus  involving the superior mesenteric and main portal vein.    PELVIC ORGANS: Normal.    OTHER: No ascites.    MUSCULOSKELETAL: No acute bony abnormality. Lumbar spinal fusion  hardware again noted. Small fat-containing left inguinal hernia  without evidence of complication.      Impression    IMPRESSION:  1.  No evidence of acute trauma in the chest, abdomen or pelvis.  2.  Cirrhosis with evidence of portal hypertension. No ascites.  3.  Likely calcified, chronic thrombus involving the superior  mesenteric and main portal vein, better seen on prior  contrast-enhanced CT.    BEATRIS FELICIANO MD         SYSTEM ID:  [id number]   CT Thoracic Spine w/o Contrast    Narrative    CT THORACIC SPINE WITHOUT CONTRAST   2/20/2023 2:16 PM     HISTORY: Trauma.     TECHNIQUE: Axial  images of the thoracic spine were obtained without  intravenous contrast. Multiplanar reformations were performed.   Radiation dose for this scan was reduced using automated exposure  control, adjustment of the mA and/or kV according to patient size, or  iterative reconstruction technique.    COMPARISON: None.    FINDINGS: There are findings consistent with diffuse idiopathic  skeletal hyperostosis, including flowing right anterolateral vertebral  ossification/syndesmophytes along the vertebral column extending from  the level of T3 down to T12. There is an atypical primarily  transversely oriented nondisplaced fracture extending through  previously bridging syndesmophyte anterior to the T5-T6 disc space and  also extending through the anterior aspect of the T5-6 disc space and  into the right anterior superior corner of the T6 vertebral body  (series 9 image 47, series 7 image 47). No other definitive fracture  identified. There is a nonspecific vertically oriented lucency on the  anterior aspect of the T3 vertebral body (series 9 image 49, series 7  image 45) which may represent a prominent endplate osteophyte. A  nondisplaced fracture is not completely excluded.    No other findings concerning for recent fracture of the thoracic  spine. Sagittal alignment is normal. There is mild sigmoid curvature  with dominant levoconvex curve centered at T3-T4. Mild multilevel  degenerative disc height loss in the thoracic region. Mild scattered  degenerative facet disease. No high-grade thoracic spinal canal  stenosis identified. There appears to be at least moderate right T2-T3  neural foraminal stenosis and mild/mild to moderate neural foraminal  narrowing elsewhere. Partial visualization of posterior fusion  instrumentation associated with the spinous processes at the T11-T12  level.    Coronary artery calcifications are noted. A hiatal hernia is present.  Somewhat nodular surface contour of the liver, concerning for  possible  cirrhosis. Please see separate report from CT of the chest, abdomen  and pelvis of same day for additional details regarding extraspinal  findings. Partially visualized multiple gallstones.      Impression    IMPRESSION:  1. Acute transversely oriented fracture through an anterior T5-T6  bridging syndesmophyte and extending through the T5-T6 disc space and  the superior aspect of the T6 vertebral body. This is seen in the  setting of diffuse idiopathic skeletal hyperostosis.  2. Nonspecific vertically oriented lucency along the anterior margin  of the T3 vertebral body, which could represent a prominent endplate  osteophyte. A nondisplaced fracture is difficult to completely  exclude. MRI could be considered for further characterization if it  would change the patient's clinical management.  3. Degenerative changes without evidence for high-grade spinal canal  stenosis in the thoracic region.  4. Partially visualized posterior fusion instrumentation extending  from T11 into the lumbar region.  5. Please see the separate report from CT chest, abdomen and pelvis of  same session for additional details.     ANEESH SCOTT MD         SYSTEM ID:  [id number]   CT Lumbar Spine w/o Contrast    Narrative    CT LUMBAR SPINE WITHOUT CONTRAST  2/20/2023 2:17 PM     HISTORY: Trauma.      TECHNIQUE: Axial images of the lumbar spine were obtained without  intravenous contrast. Multiplanar reformations were performed.   Radiation dose for this scan was reduced using automated exposure  control, adjustment of the mA and/or kV according to patient size, or  iterative reconstruction technique.     COMPARISON: CT of the chest, abdomen and pelvis of same day.  Correlation is also made with CT abdomen and pelvis dated 8/9/2021.     FINDINGS: Nomenclature is based on five lumbar vertebral bodies. No  definite acute lumbar spine fracture is identified. Mild degenerative  retrolisthesis of L2 on L3 and L3 on L4. Mild dextroconvex  curvature  with apex near the thoracolumbar junction.    There is at least partial marginal fusion across the T11-T12, T12-L1  and L1-L2 disc spaces. There is hardware involving the spinous  processes extending from T11 to L2, and there appears to be solid  osseous fusion of the posterior elements from T11 down to L2. There is  mild/mild to moderate disc height loss from L2-L3 through L5-S1 with  scattered vacuum disc phenomenon. Marginal endplate osteophytes and  moderate to severe multilevel degenerative facet disease. Probable  bone graft harvest site along the posterior aspect of the right iliac  bone.    Multilevel disc bulges with posterior endplate osteophytic ridging.  There appears to be up to moderate to severe spinal canal stenosis at  the L4-L5 level with lesser degrees of lumbar spinal canal narrowing  elsewhere. Moderate to severe right L4-L5 neural foraminal stenosis.  There is at least moderate right L3-L4 and L5-S1 neural foraminal  stenosis. At least moderate left L3-L4, L4-L5 and L5-S1 neural  foraminal stenosis.    Partially visualized gallstone and nodular contour of the liver. The  spleen appears probably enlarged. Scattered atherosclerotic  calcifications of the aortoiliac arteries. Please see separate report  from CT of the chest, abdomen and pelvis of same day for details  regarding extraspinal findings.      Impression    IMPRESSION:  1. No acute lumbar spine fracture.  2. Posterior instrumented fusion from T11 to L2.  3. Multilevel degenerative change, as described. There appears to be  up to moderate to severe spinal canal stenosis at L4-L5. Multilevel  neural foraminal stenosis, moderate to severe on the right at L4-L5  and at least moderate at multiple additional levels, as described.     ANEESH SCOTT MD         SYSTEM ID:  [id number]   CT Head w/o Contrast    Narrative    CT SCAN OF THE HEAD WITHOUT CONTRAST February 20, 2023 2:18 PM     HISTORY: Trauma.    TECHNIQUE: Axial images of  the head and coronal reformations without  IV contrast material. Radiation dose for this scan was reduced using  automated exposure control, adjustment of the mA and/or kV according  to patient size, or iterative reconstruction technique.    COMPARISON: None.    FINDINGS: There is no evidence of intracranial hemorrhage, mass, acute  infarct or anomaly. The ventricles are normal in size and  configuration. Mild to moderate generalized brain parenchymal volume  loss. Mild patchy nonspecific hypoattenuation in the cerebral white  matter, most likely due to chronic small vessel ischemic disease.     Mild right parietal scalp swelling without underlying calvarial  fracture. This may be posttraumatic in nature. The visualized aspects  of the paranasal sinuses are free of significant disease. The mastoid  and middle ear cavities appear clear. The bony calvarium and bones of  the skull base appear intact. Degenerative changes of the  craniocervical junction are partially visualized.      Impression    IMPRESSION:  1. No CT findings of acute intracranial process.  2. Brain atrophy and presumed chronic small vessel ischemic changes,  as described.  3. Mild right parietal scalp swelling without underlying calvarial  fracture.    ANEESH SCOTT MD         SYSTEM ID:  [id number]   CT Cervical Spine w/o Contrast    Narrative    CT CERVICAL SPINE WITHOUT CONTRAST February 20, 2023 2:18 PM     HISTORY: Trauma.     TECHNIQUE: Axial images of the cervical spine were obtained without  intravenous contrast. Multiplanar reformations were performed.  Radiation dose for this scan was reduced using automated exposure  control, adjustment of the mA and/or kV according to patient size, or  iterative reconstruction technique.    COMPARISON: None.    FINDINGS: No acute cervical spine fracture is identified. There is  straightening of the normal cervical lordosis, which can be  positional. There is mild dextroconvex curvature with apex at  C6-C7.  No posttraumatic subluxation is identified. Incompletely fused  bilateral transverse processes at T1.    Mild to moderate disc height loss at C5-C6 and C6-C7 with marginal  endplate osteophytes. Scattered uncovertebral spurring. Multilevel  disc osteophyte complexes. Degenerative facet disease, most advanced  on the left at C2-C3 and to a lesser degree elsewhere bilaterally in  the upper to mid cervical spine. Degenerative changes of the median  atlantoaxial joint.    There appears to be moderate to severe spinal canal stenosis at  multiple levels, probably most pronounced at the C5-C6 level but also  significant at the C3-C4 level and to a slightly lesser degree at  C4-C5 and C6-C7. Varying degrees of multilevel degenerative neural  foraminal stenosis, moderate to severe on the left at C5-C6 and at  least moderate at multiple other levels.    Subcentimeter hypodense lesion in the right thyroid lobe without  aggressive features, not necessarily requiring follow-up in patient of  this age by ACR imaging criteria. Bilateral carotid bifurcation  atherosclerotic calcification. The visualized paraspinous soft tissues  otherwise appear unremarkable.      Impression    IMPRESSION:  1. No acute fracture or posttraumatic malalignment of the cervical  spine.  2. Multilevel degenerative changes, as described.  3. Moderate/severe multilevel spinal canal and neural foraminal  stenosis related to degenerative changes.    ANEESH SCOTT MD         SYSTEM ID:  [id number]   Glucose by meter   Result Value Ref Range    GLUCOSE BY METER POCT 196 (H) 70 - 99 mg/dL       Medications   lidocaine (LMX4) kit (has no administration in time range)   sodium chloride (PF) 0.9% PF flush 3 mL (3 mLs Intracatheter Given 2/20/23 2032)   sodium chloride (PF) 0.9% PF flush 3 mL (has no administration in time range)   atorvastatin (LIPITOR) tablet 10 mg (has no administration in time range)   Vitamin D3 (CHOLECALCIFEROL) tablet 25 mcg (has no  administration in time range)   hydrochlorothiazide (HYDRODIURIL) tablet 50 mg (has no administration in time range)   levothyroxine (SYNTHROID/LEVOTHROID) tablet 25 mcg (has no administration in time range)   metFORMIN (GLUCOPHAGE) tablet 500 mg (500 mg Oral Not Given 2/20/23 2004)   metoprolol succinate ER (TOPROL XL) 24 hr tablet 100 mg (has no administration in time range)   multivitamin w/minerals (THERA-VIT-M) tablet 1 tablet (has no administration in time range)   pregabalin (LYRICA) capsule 25 mg (25 mg Oral Given 2/20/23 2031)   spironolactone (ALDACTONE) tablet 25 mg (has no administration in time range)   tamsulosin (FLOMAX) capsule 0.4 mg (has no administration in time range)   valsartan (DIOVAN) tablet 160 mg (has no administration in time range)   pantoprazole (PROTONIX) EC tablet 40 mg (has no administration in time range)   melatonin tablet 1 mg (has no administration in time range)   ondansetron (ZOFRAN ODT) ODT tab 4 mg (has no administration in time range)     Or   ondansetron (ZOFRAN) injection 4 mg (has no administration in time range)   oxyCODONE (ROXICODONE) tablet 5 mg (has no administration in time range)   oxyCODONE IR (ROXICODONE) half-tab 7.5 mg (has no administration in time range)   polyethylene glycol (MIRALAX) Packet 17 g (has no administration in time range)   prochlorperazine (COMPAZINE) injection 5 mg (has no administration in time range)     Or   prochlorperazine (COMPAZINE) tablet 5 mg (has no administration in time range)     Or   prochlorperazine (COMPAZINE) suppository 12.5 mg (has no administration in time range)   Lidocaine (LIDOCARE) 4 % Patch 2 patch (2 patches Transdermal Patch/Med Applied 2/20/23 2033)     And   lidocaine patch in PLACE (has no administration in time range)   glucose gel 15-30 g (has no administration in time range)     Or   dextrose 50 % injection 25-50 mL (has no administration in time range)     Or   glucagon injection 1 mg (has no administration in  time range)   insulin aspart (NovoLOG) injection (RAPID ACTING) (has no administration in time range)   insulin aspart (NovoLOG) injection (RAPID ACTING) (has no administration in time range)   naloxone (NARCAN) injection 0.2 mg (has no administration in time range)     Or   naloxone (NARCAN) injection 0.4 mg (has no administration in time range)     Or   naloxone (NARCAN) injection 0.2 mg (has no administration in time range)     Or   naloxone (NARCAN) injection 0.4 mg (has no administration in time range)   magnesium oxide (MAG-OX) tablet 400 mg (has no administration in time range)   HYDROmorphone (PF) (DILAUDID) injection 0.5 mg (0.5 mg Intravenous Given 2/20/23 1918)       Assessments & Plan (with Medical Decision Making)  72-year-old male who presents after a slip and fall on the ice.  Head contusion.  Pan scan reveals stable fracture through osteophyte and superior portion of T6.  I did review his imaging studies with Dr. Del Rio with neurosurgery who felt he was appropriate to stay here with pain management and possible TLSO bracing.  Also with some right shoulder pain later reported.  In reviewing the imaging, no obvious bony fracture or dislocation to this region.  He is excepted for admission by Dr. Crews.     I have reviewed the nursing notes.    I have reviewed the findings, diagnosis, plan and need for follow up with the patient.          Current Discharge Medication List          Final diagnoses:   Closed fracture of sixth thoracic vertebra, unspecified fracture morphology, initial encounter (H)       2/20/2023   [facility] EMERGENCY DEPT     Piyush Alfaro MD  02/20/23 8668

## 2023-02-21 ENCOUNTER — APPOINTMENT (OUTPATIENT)
Dept: PHYSICAL THERAPY | Facility: CLINIC | Age: 73
End: 2023-02-21
Attending: NURSE PRACTITIONER
Payer: COMMERCIAL

## 2023-02-21 ENCOUNTER — DOCUMENTATION ONLY (OUTPATIENT)
Dept: ORTHOPEDICS | Facility: CLINIC | Age: 73
End: 2023-02-21
Payer: COMMERCIAL

## 2023-02-21 ENCOUNTER — APPOINTMENT (OUTPATIENT)
Dept: MRI IMAGING | Facility: CLINIC | Age: 73
End: 2023-02-21
Attending: NURSE PRACTITIONER
Payer: COMMERCIAL

## 2023-02-21 ENCOUNTER — APPOINTMENT (OUTPATIENT)
Dept: CARDIOLOGY | Facility: CLINIC | Age: 73
End: 2023-02-21
Attending: NURSE PRACTITIONER
Payer: COMMERCIAL

## 2023-02-21 LAB
ALBUMIN SERPL BCG-MCNC: 3.5 G/DL (ref 3.5–5.2)
ALBUMIN UR-MCNC: NEGATIVE MG/DL
ALP SERPL-CCNC: 89 U/L (ref 40–129)
ALT SERPL W P-5'-P-CCNC: 42 U/L (ref 10–50)
ANION GAP SERPL CALCULATED.3IONS-SCNC: 10 MMOL/L (ref 7–15)
APPEARANCE UR: CLEAR
AST SERPL W P-5'-P-CCNC: 40 U/L (ref 10–50)
BASOPHILS # BLD AUTO: 0 10E3/UL (ref 0–0.2)
BASOPHILS NFR BLD AUTO: 0 %
BILIRUB SERPL-MCNC: 1.5 MG/DL
BILIRUB UR QL STRIP: NEGATIVE
BUN SERPL-MCNC: 24 MG/DL (ref 8–23)
CALCIUM SERPL-MCNC: 8.9 MG/DL (ref 8.8–10.2)
CHLORIDE SERPL-SCNC: 102 MMOL/L (ref 98–107)
COLOR UR AUTO: YELLOW
CREAT SERPL-MCNC: 0.97 MG/DL (ref 0.67–1.17)
DEPRECATED HCO3 PLAS-SCNC: 26 MMOL/L (ref 22–29)
EOSINOPHIL # BLD AUTO: 0.1 10E3/UL (ref 0–0.7)
EOSINOPHIL NFR BLD AUTO: 2 %
ERYTHROCYTE [DISTWIDTH] IN BLOOD BY AUTOMATED COUNT: 13.6 % (ref 10–15)
GFR SERPL CREATININE-BSD FRML MDRD: 83 ML/MIN/1.73M2
GLUCOSE BLDC GLUCOMTR-MCNC: 130 MG/DL (ref 70–99)
GLUCOSE BLDC GLUCOMTR-MCNC: 135 MG/DL (ref 70–99)
GLUCOSE BLDC GLUCOMTR-MCNC: 144 MG/DL (ref 70–99)
GLUCOSE BLDC GLUCOMTR-MCNC: 150 MG/DL (ref 70–99)
GLUCOSE BLDC GLUCOMTR-MCNC: 158 MG/DL (ref 70–99)
GLUCOSE SERPL-MCNC: 165 MG/DL (ref 70–99)
GLUCOSE UR STRIP-MCNC: NEGATIVE MG/DL
HCT VFR BLD AUTO: 38.3 % (ref 40–53)
HGB BLD-MCNC: 12.4 G/DL (ref 13.3–17.7)
HGB UR QL STRIP: NEGATIVE
IMM GRANULOCYTES # BLD: 0 10E3/UL
IMM GRANULOCYTES NFR BLD: 0 %
KETONES UR STRIP-MCNC: NEGATIVE MG/DL
LEUKOCYTE ESTERASE UR QL STRIP: NEGATIVE
LVEF ECHO: NORMAL
LYMPHOCYTES # BLD AUTO: 1 10E3/UL (ref 0.8–5.3)
LYMPHOCYTES NFR BLD AUTO: 16 %
MAGNESIUM SERPL-MCNC: 1.7 MG/DL (ref 1.7–2.3)
MCH RBC QN AUTO: 31.9 PG (ref 26.5–33)
MCHC RBC AUTO-ENTMCNC: 32.4 G/DL (ref 31.5–36.5)
MCV RBC AUTO: 99 FL (ref 78–100)
MONOCYTES # BLD AUTO: 0.6 10E3/UL (ref 0–1.3)
MONOCYTES NFR BLD AUTO: 10 %
NEUTROPHILS # BLD AUTO: 4.4 10E3/UL (ref 1.6–8.3)
NEUTROPHILS NFR BLD AUTO: 72 %
NITRATE UR QL: NEGATIVE
NRBC # BLD AUTO: 0 10E3/UL
NRBC BLD AUTO-RTO: 0 /100
PH UR STRIP: 5 [PH] (ref 5–7)
PLATELET # BLD AUTO: 75 10E3/UL (ref 150–450)
POTASSIUM SERPL-SCNC: 4.1 MMOL/L (ref 3.4–5.3)
PROT SERPL-MCNC: 6.5 G/DL (ref 6.4–8.3)
RBC # BLD AUTO: 3.89 10E6/UL (ref 4.4–5.9)
SODIUM SERPL-SCNC: 138 MMOL/L (ref 136–145)
SP GR UR STRIP: 1.01 (ref 1–1.03)
T4 FREE SERPL-MCNC: 1.07 NG/DL (ref 0.9–1.7)
TSH SERPL DL<=0.005 MIU/L-ACNC: 9.19 UIU/ML (ref 0.3–4.2)
UROBILINOGEN UR STRIP-MCNC: NORMAL MG/DL
WBC # BLD AUTO: 6.1 10E3/UL (ref 4–11)

## 2023-02-21 PROCEDURE — G0378 HOSPITAL OBSERVATION PER HR: HCPCS

## 2023-02-21 PROCEDURE — 250N000011 HC RX IP 250 OP 636: Performed by: NURSE PRACTITIONER

## 2023-02-21 PROCEDURE — 36415 COLL VENOUS BLD VENIPUNCTURE: CPT | Performed by: NURSE PRACTITIONER

## 2023-02-21 PROCEDURE — 93306 TTE W/DOPPLER COMPLETE: CPT

## 2023-02-21 PROCEDURE — 93306 TTE W/DOPPLER COMPLETE: CPT | Mod: 26 | Performed by: INTERNAL MEDICINE

## 2023-02-21 PROCEDURE — 84439 ASSAY OF FREE THYROXINE: CPT | Performed by: NURSE PRACTITIONER

## 2023-02-21 PROCEDURE — 72146 MRI CHEST SPINE W/O DYE: CPT

## 2023-02-21 PROCEDURE — 97530 THERAPEUTIC ACTIVITIES: CPT | Mod: GP | Performed by: PHYSICAL THERAPIST

## 2023-02-21 PROCEDURE — 97162 PT EVAL MOD COMPLEX 30 MIN: CPT | Mod: GP | Performed by: PHYSICAL THERAPIST

## 2023-02-21 PROCEDURE — 84443 ASSAY THYROID STIM HORMONE: CPT | Performed by: NURSE PRACTITIONER

## 2023-02-21 PROCEDURE — 85025 COMPLETE CBC W/AUTO DIFF WBC: CPT | Performed by: NURSE PRACTITIONER

## 2023-02-21 PROCEDURE — 82306 VITAMIN D 25 HYDROXY: CPT | Performed by: NURSE PRACTITIONER

## 2023-02-21 PROCEDURE — 80053 COMPREHEN METABOLIC PANEL: CPT | Performed by: NURSE PRACTITIONER

## 2023-02-21 PROCEDURE — 250N000013 HC RX MED GY IP 250 OP 250 PS 637: Performed by: NURSE PRACTITIONER

## 2023-02-21 PROCEDURE — 96375 TX/PRO/DX INJ NEW DRUG ADDON: CPT | Mod: XU

## 2023-02-21 PROCEDURE — 83735 ASSAY OF MAGNESIUM: CPT | Performed by: NURSE PRACTITIONER

## 2023-02-21 PROCEDURE — 250N000013 HC RX MED GY IP 250 OP 250 PS 637: Performed by: INTERNAL MEDICINE

## 2023-02-21 PROCEDURE — 99232 SBSQ HOSP IP/OBS MODERATE 35: CPT | Performed by: NURSE PRACTITIONER

## 2023-02-21 PROCEDURE — 82962 GLUCOSE BLOOD TEST: CPT | Mod: 91

## 2023-02-21 PROCEDURE — 81003 URINALYSIS AUTO W/O SCOPE: CPT | Performed by: NURSE PRACTITIONER

## 2023-02-21 PROCEDURE — 250N000011 HC RX IP 250 OP 636: Performed by: INTERNAL MEDICINE

## 2023-02-21 RX ORDER — MORPHINE SULFATE 2 MG/ML
2 INJECTION, SOLUTION INTRAMUSCULAR; INTRAVENOUS
Status: COMPLETED | OUTPATIENT
Start: 2023-02-21 | End: 2023-02-21

## 2023-02-21 RX ORDER — MAGNESIUM OXIDE 400 MG/1
400 TABLET ORAL EVERY 4 HOURS
Status: COMPLETED | OUTPATIENT
Start: 2023-02-21 | End: 2023-02-21

## 2023-02-21 RX ORDER — NITROGLYCERIN 0.4 MG/1
0.4 TABLET SUBLINGUAL EVERY 5 MIN PRN
Status: DISCONTINUED | OUTPATIENT
Start: 2023-02-21 | End: 2023-02-23 | Stop reason: HOSPADM

## 2023-02-21 RX ADMIN — METFORMIN HYDROCHLORIDE 500 MG: 500 TABLET ORAL at 08:51

## 2023-02-21 RX ADMIN — HYDROCHLOROTHIAZIDE 50 MG: 25 TABLET ORAL at 08:59

## 2023-02-21 RX ADMIN — Medication 25 MCG: at 08:59

## 2023-02-21 RX ADMIN — LEVOTHYROXINE SODIUM 25 MCG: 25 TABLET ORAL at 07:43

## 2023-02-21 RX ADMIN — LIDOCAINE 2 PATCH: 560 PATCH PERCUTANEOUS; TOPICAL; TRANSDERMAL at 20:22

## 2023-02-21 RX ADMIN — SPIRONOLACTONE 25 MG: 25 TABLET, FILM COATED ORAL at 08:59

## 2023-02-21 RX ADMIN — MAGNESIUM OXIDE TAB 400 MG (241.3 MG ELEMENTAL MG) 400 MG: 400 (241.3 MG) TAB at 12:16

## 2023-02-21 RX ADMIN — PREGABALIN 25 MG: 25 CAPSULE ORAL at 09:00

## 2023-02-21 RX ADMIN — MAGNESIUM OXIDE TAB 400 MG (241.3 MG ELEMENTAL MG) 400 MG: 400 (241.3 MG) TAB at 08:52

## 2023-02-21 RX ADMIN — NITROGLYCERIN 0.4 MG: 0.4 TABLET SUBLINGUAL at 01:13

## 2023-02-21 RX ADMIN — PREGABALIN 25 MG: 25 CAPSULE ORAL at 20:36

## 2023-02-21 RX ADMIN — METOPROLOL SUCCINATE 100 MG: 100 TABLET, EXTENDED RELEASE ORAL at 09:00

## 2023-02-21 RX ADMIN — PANTOPRAZOLE SODIUM 40 MG: 40 TABLET, DELAYED RELEASE ORAL at 07:43

## 2023-02-21 RX ADMIN — OXYCODONE HYDROCHLORIDE 7.5 MG: 5 TABLET ORAL at 09:10

## 2023-02-21 RX ADMIN — MORPHINE SULFATE 2 MG: 2 INJECTION, SOLUTION INTRAMUSCULAR; INTRAVENOUS at 01:43

## 2023-02-21 RX ADMIN — MAGNESIUM OXIDE TAB 400 MG (241.3 MG ELEMENTAL MG) 400 MG: 400 (241.3 MG) TAB at 01:03

## 2023-02-21 RX ADMIN — PREGABALIN 25 MG: 25 CAPSULE ORAL at 13:52

## 2023-02-21 RX ADMIN — ONDANSETRON 4 MG: 4 TABLET, ORALLY DISINTEGRATING ORAL at 20:22

## 2023-02-21 RX ADMIN — VALSARTAN 160 MG: 80 TABLET, FILM COATED ORAL at 09:00

## 2023-02-21 RX ADMIN — OXYCODONE HYDROCHLORIDE 7.5 MG: 5 TABLET ORAL at 20:37

## 2023-02-21 RX ADMIN — METFORMIN HYDROCHLORIDE 500 MG: 500 TABLET ORAL at 17:12

## 2023-02-21 RX ADMIN — ATORVASTATIN CALCIUM 10 MG: 10 TABLET, FILM COATED ORAL at 08:59

## 2023-02-21 RX ADMIN — OXYCODONE HYDROCHLORIDE 7.5 MG: 5 TABLET ORAL at 04:21

## 2023-02-21 RX ADMIN — MULTIPLE VITAMINS W/ MINERALS TAB 1 TABLET: TAB at 12:15

## 2023-02-21 RX ADMIN — OXYCODONE HYDROCHLORIDE 7.5 MG: 5 TABLET ORAL at 14:16

## 2023-02-21 RX ADMIN — NITROGLYCERIN 0.4 MG: 0.4 TABLET SUBLINGUAL at 01:07

## 2023-02-21 RX ADMIN — TAMSULOSIN HYDROCHLORIDE 0.4 MG: 0.4 CAPSULE ORAL at 09:00

## 2023-02-21 ASSESSMENT — ACTIVITIES OF DAILY LIVING (ADL)
ADLS_ACUITY_SCORE: 28
ADLS_ACUITY_SCORE: 28
ADLS_ACUITY_SCORE: 27
ADLS_ACUITY_SCORE: 27
ADLS_ACUITY_SCORE: 28
ADLS_ACUITY_SCORE: 28
ADLS_ACUITY_SCORE: 27
ADLS_ACUITY_SCORE: 27
ADLS_ACUITY_SCORE: 28
ADLS_ACUITY_SCORE: 27
ADLS_ACUITY_SCORE: 28
ADLS_ACUITY_SCORE: 27

## 2023-02-21 NOTE — PROGRESS NOTES
02/21/23 1032   Appointment Info   Signing Clinician's Name / Credentials (PT) Concepcion Lloyd PT, DPT, ATC, LAT   Quick Adds   Quick Adds Certification       Present no   Living Environment   People in Home alone   Current Living Arrangements house   Home Accessibility stairs to enter home;stairs within home   Number of Stairs, Main Entrance 2   Stair Railings, Main Entrance none  (using surfaces or door frame at baseline)   Number of Stairs, Within Home, Primary six   Stair Railings, Within Home, Primary railing on right side (ascending)   Transportation Anticipated family or friend will provide   Living Environment Comments split 6 stairs up and down. stays on lower level with bedroom and half bathroom. lower level fridge, upper level kitchen. step over tub on upper level and bathroom on upper level   Self-Care   Usual Activity Tolerance moderate   Current Activity Tolerance fair   Regular Exercise No   Equipment Currently Used at Home grab bar, tub/shower   Fall history within last six months yes   Number of times patient has fallen within last six months 3   Activity/Exercise/Self-Care Comment patient notes actively remodeling the  home. cane potentially available to use in home   General Information   Onset of Illness/Injury or Date of Surgery 02/20/23   Referring Physician Tess CONTRERAS CNP   Patient/Family Therapy Goals Statement (PT) go to rehab   Pertinent History of Current Problem (include personal factors and/or comorbidities that impact the POC) Porsche is a 72 year old male, registered OBSERVATION status from the ED due to traumatic fall with head injury, found to have T5-T6 fracture. Patient with a previous medical history of DM type 2, [id number] fusion, HTN, liver cirrhosis, LE edema, HLD, splenomegaly, GERD, obesity, SHONDA, chroinc right shoulder pain.   Existing Precautions/Restrictions spinal   Weight-Bearing Status - LUE weight-bearing as tolerated   Weight-Bearing  Status - RUE weight-bearing as tolerated   Weight-Bearing Status - LLE weight-bearing as tolerated   Weight-Bearing Status - RLE weight-bearing as tolerated   General Observations PT eval and treat: DC rec. Activity orders: IS, orthostati BPs, up with assist   Cognition   Affect/Mental Status (Cognition) WFL   Orientation Status (Cognition) oriented x 4   Follows Commands (Cognition) WFL   Pain Assessment   Patient Currently in Pain Yes, see Vital Sign flowsheet  (8/10 sitting in chair with TLSO on)   Integumentary/Edema   Integumentary/Edema Comments wearing tshirt under TLSO   Posture    Posture Forward head position   Range of Motion (ROM)   Range of Motion ROM is WFL;ROM deficits secondary to pain   Strength (Manual Muscle Testing)   Strength (Manual Muscle Testing) strength is WFL   Strength Comments RLE weakness - patient notes baseline and accomodates well.   Bed Mobility   Bed Mobility supine-sit;sit-supine   Supine-Sit Lares (Bed Mobility) verbal cues   Sit-Supine Lares (Bed Mobility) verbal cues   Impairments Contributing to Impaired Bed Mobility pain  (dizziness)   Comment, (Bed Mobility) sitting to left sidelying patient reporting profound dizziness, demonstrates nystagmus lasting less than 1 minute and no increase with rolling or getting out of bed.   Transfers   Transfers sit-stand transfer;bed-chair transfer   Transfer Safety Concerns Noted decreased step length  (LOB in all directions)   Impairments Contributing to Impaired Transfers impaired balance;pain   Bed-Chair Transfer   Assistive Device (Bed-Chair Transfers)   (surfaces)   Bed-Chair Lares (Transfers) supervision   Sit-Stand Transfer   Sit-Stand Lares (Transfers) supervision;verbal cues   Gait/Stairs (Locomotion)   Lares Level (Gait) supervision;verbal cues   Assistive Device (Gait) walker, front-wheeled   Distance in Feet 220'   Pattern (Gait) step-through   Deviations/Abnormal Patterns (Gait)   (guarded and  stiff posture throughout. RLE ataxic hip flexion and hip hike to clear weak RLE DF)   Negotiation (Stairs) stairs independence;stairs assistive device;handrail location;number of steps;descending technique;ascending technique   [place] Level (Stairs) supervision   Handrail Location (Stairs) both sides   Number of Steps (Stairs) 4   Ascending Technique (Stairs) step-to-step   Descending Technique (Stairs) step-to-step  (retro)   Balance   Balance Comments standing balance with sway and dizziness, improved security and decreased sway with walker support. IND short sitting balance   Sensory Examination   Sensory Perception patient reports no sensory changes   Coordination   Coordination no deficits were identified   Muscle Tone   Muscle Tone no deficits were identified   Clinical Impression   Criteria for Skilled Therapeutic Intervention Yes, treatment indicated   PT Diagnosis (PT) impaired mobility, impaired balance, dizziness   Influenced by the following impairments fall with head trauma and Tspine compression fracture   Functional limitations due to impairments TLSO on at all times, VC and guidance for proper management. support of walker for safe mobility   Clinical Presentation (PT Evaluation Complexity) Evolving/Changing   Clinical Presentation Rationale concerns of concussion and safety post fracture due to dizziness. unable to manage TLSO IND. clinical jdugement.   Clinical Decision Making (Complexity) moderate complexity   Planned Therapy Interventions (PT) balance training;orthotic fitting/training;patient/family education;strengthening;transfer training;home program guidelines;progressive activity/exercise   Anticipated Equipment Needs at Discharge (PT) walker, rolling   Risk & Benefits of therapy have been explained evaluation/treatment results reviewed;participants voiced agreement with care plan;participants included;patient   PT Total Evaluation Time   PT Eval, Moderate Complexity Minutes (80560)  25   Therapy Certification   Start of care date 02/21/23   Certification date from 02/21/23   Certification date to 02/25/23   Medical Diagnosis T6 compression facture   Physical Therapy Goals   PT Frequency Daily   PT Predicted Duration/Target Date for Goal Attainment 02/25/23   PT Goals Bed Mobility;PT Goal 1   PT: Bed Mobility Independent;Supine to/from sit;Within precautions   PT: Goal 1 Patient will be IND in TLSO donning and doffing for safety and compliance upon discharge.   Interventions   Interventions Quick Adds Therapeutic Activity   Therapeutic Activity   Therapeutic Activities: dynamic activities to improve functional performance Minutes (83487) 30   PT Discharge Planning   PT Plan TLSO training,   PT Discharge Recommendation (DC Rec) home with home care physical therapy   PT Rationale for DC Rec patient from home alone, no DME at baseline but history of RLE weakness. Patient unclear of resources for help at home. Currently patient requires cues for TLSO donning and doffing and mobilized with good strength. He demonstrates dizziness with sit to supine to his left, in the setting of his head trauma and spine fractures did not assess vertigo this date. Patient would benefit from further management of these symptoms prior to discharge and continued TLSO training. If he is unable to IND manage his brace and does not have support at home he would benefit from TCU placement for safety, otherwise he can discharge home with PRN support, HHPT to address safety and balance training as well as dizziness management.   PT Brief overview of current status VC TLSO management. SBA sit to/from stand. SBA bed to chair transfer. VC and SBA bed mobility. Ambulation 220' with walker and TLSO, SBA. SBA 4 stairs with bilat hand rail, step to pattern.   Total Session Time   Timed Code Treatment Minutes 30   Total Session Time (sum of timed and untimed services) 55   [facility]  OUTPATIENT  PHYSICAL THERAPY EVALUATION  PLAN OF TREATMENT FOR OUTPATIENT REHABILITATION  (COMPLETE FOR INITIAL CLAIMS ONLY)  Patient's Last Name, First Name, M.I.  YOB: 1950  Gucci Logan                        Provider's Name  [facility] Medical Record No.  [id number]                             Onset Date:  02/20/23   Start of Care Date:  02/21/23   Type:     _X_PT   ___OT   ___SLP Medical Diagnosis:  T6 compression facture              PT Diagnosis:  impaired mobility, impaired balance, dizziness Visits from SOC:  1     See note for plan of treatment, functional goals and certification details    I CERTIFY THE NEED FOR THESE SERVICES FURNISHED UNDER        THIS PLAN OF TREATMENT AND WHILE UNDER MY CARE     (Physician co-signature of this document indicates review and certification of the therapy plan).                Thank you for your referral.  Concepcion Lloyd, PT, DPT, ATC, LAT    [facility]  O: [phone]  E: [email or web address]

## 2023-02-21 NOTE — PROGRESS NOTES
"PRIMARY DIAGNOSIS: \"GENERIC\" NURSING  OUTPATIENT/OBSERVATION GOALS TO BE MET BEFORE DISCHARGE:  1. ADLs back to baseline: No    2. Activity and level of assistance: Assist of 1 with gaitbelt    3. Pain status: Improved-controlled with oral pain medications.    4. Return to near baseline physical activity: No     Discharge Planner Nurse   Safe discharge environment identified: Yes  Barriers to discharge: Yes     Pt has dizziness and nystagmus noted from sitting to lying in bed. Informed ANG Vaca NP. Orthostatic BP done.         Entered by: Jesus Barba RN 02/21/2023 1:33 PM     Please review provider order for any additional goals.   Nurse to notify provider when observation goals have been met and patient is ready for discharge.  " Price (Do Not Change): 0.00 Instructions: This plan will send the code FBSD to the PM system.  DO NOT or CHANGE the price. Detail Level: Simple

## 2023-02-21 NOTE — PROGRESS NOTES
[facility]    Medicine Progress Note - Hospitalist Service    Date of Admission:  2/20/2023    Assessment & Plan   Gucci Logan is a 72 year old male admitted on 2/20/2023.  He sustained traumatic fall with head injury, personal report passing out after fall and on CT found to have acute T5-T6 fracture.  Patient admitted observation status for pain control, PT/SW/Curbside Neurosurgery consultation and clinical monitoring.     # Acute T5-T6 spine fracture:  # History T11-Lumbar spine fusion > 20 years ago:  Conservative pain control: Oxycodone, lidocaine patches, stool softener  Chronic liver disease, no tylenol or muscle relaxers  Lyrica dose adjusted   MRI T-spine Acute fracture involving the ossified anterior longitudinal  ligament at T5-6 extending into the T5-6 disc space, T6 vertebral  body, and T6-7 disc joint.   TLSO brace which was discussed in ED with neurosurgery. Pt having difficulty placing himself  PT/SW consultation.     # Fall, Subsequent Encounter:  # Head Injury:  # Right Parietal Superficial Laceration/Swelling:  CT Head negative no bleed, mild parietal scalp swelling without fracture  Negative Cervical Spine, Chest, Abdomen negative for acute findings.   Routine wound cares  Fall Precautions  Having dizziness Re CT in 24-48 hrs ir if he is acutely worse due to thrombocytopenia      # Personal Report Syncopal Episode:  Patient reports he may have passed out after traumatic fall and head injury.  Aspirin on med list but he states he does not take it daily.  Head CT negative for acute intracranial findings.  Reasonable syncopal work-up: neuro checks, Cardiac monitoring, EKG, fingerstick blood glucose, orthostatics-if orthostatic positive, contact overnight provider to hold blood pressure medications.  Replace lytes per high replacement protocol  Check ua/uc for work up completion     # Diabetes Mellitus Type 2, Non Insulin Dependent: Hgb A1c 7.0  # Diabetic  autonomic neuropathy  Continue home dose Metformin  CHO 45 g per meal  Sliding scale with meals and at bedtime  At discharge resume Semaglutide weekly subcutaneous for weight management.     # Primary Hypertension goal BP < 140/90  Continue home dose Metoprolol, Valsartan, Spironolactone, Hydrocholrothiazide.     # Hyperkalemia, Mild:   Lab report reading as hemolyzed specimen  Recheck K+ in am     # Liver Cirrhosis:  # Portal Vein Thrombosis:  # History Chronic Hepatitis C:  # Lower Extremity Edema, Bilateral, Chronic:  Home dose Spironolactone.  ALT marginally elevated   Compression Asher hose, elevate  CMP in am     # Thrombocytopenia, Chronic: In setting of liver disease  Baseline platelets ~ 50-60  Plts 75. No bleeding      # Hyperlipidemia:   Home dose statin     # Hypothyroidism:   Last TSH slightly elevated. Recheck. continue home Levothyroxine.      # Splenomegaly, Chronic: Secondary to cirrhosis     # GERD:  Home dose Omeprazole.     # Class II obesity: Body mass index is 36.97 kg/m . encourage healthy meal choices, physical activity per patient tolerance. Weekly Semaglutide as above.     # SHONDA: Encourage CPAP use.     # Arthritis:  # Right Shoulder pain, chronic:   As needed tramadol at home    DW PT re; nystagmus and overall functioning. Pt had difficulty placing TLSO brace due to decreased UE ROM. Will have repeat head CT in 24-48 hrs due to CHI/Thrombocytopenia       Diet: Combination Diet Regular Diet; Low Consistent Carb (45 g CHO per Meal) Diet; Low Saturated Fat Na <2400mg Diet    DVT Prophylaxis: Pneumatic Compression Devices, Anti-embolisim stockings (TEDs) Thrombocytopenic   Rankin Catheter: Not present  Lines: None     Cardiac Monitoring: ACTIVE order. Indication: Tachyarrhythmias, acute (48 hours)  Code Status: Full Code      Clinically Significant Risk Factors Present on Admission                # Thrombocytopenia: Lowest platelets = 75 in last 2 days, will monitor for bleeding   # Hypertension:  "home medication list includes antihypertensive(s)     # DMII: A1C = 7.0 % (Ref range: 0.0 - 5.6 %) within past 6 months    # Obesity: Estimated body mass index is 36.97 kg/m  as calculated from the following:    Height as of this encounter: 1.803 m (5' 11\").    Weight as of this encounter: 120.2 kg (265 lb 1.6 oz).           Disposition Plan     Expected Discharge Date: 02/21/2023                The patient's care was discussed with the Attending Physician, Dr. Gilbert .    DIANE Price [facility]  Hospitalist Service  [facility]  Securely message with [employer] (more info)  Text page via [facility] Paging/Directory   ______________________________________________________________________    Interval History   Reviewed overnight notes, no significant events     Physical Exam   Vital Signs: Temp: 98.5  F (36.9  C) Temp src: Oral BP: 133/63 Pulse: 77   Resp: 18 SpO2: 94 % O2 Device: None (Room air)    Weight: 265 lbs 1.6 oz    Constitutional: 71 yo male in bed, on RA and not in acute distress  Eyes: pupils equal, round and reactive to light and mild horizontal nystagmus   Hematologic / Lymphatic: No bleeding or bruising   Respiratory: No increased work of breathing, good air exchange, clear to auscultation bilaterally, no crackles or wheezing  Cardiovascular: Normal apical impulse, regular rate and rhythm, normal S1 and S2, no S3 or S4, and no murmur noted  GI: normal bowel sounds, soft, non-distended and non-tender  Skin: no bruising or bleeding, normal skin color, texture, turgor, no redness, warmth, or swelling and no rashes  Musculoskeletal: no lower extremity pitting edema present  there is no redness, warmth, or swelling of the joints  Neurologic: A&O x4, CARLSON, No focal deficits   Neuropsychiatric: General: normal, calm and normal eye contact  Level of consciousness: alert / normal  Affect: normal  Orientation: oriented to self, place, time and situation    Medical Decision Making "       45  MINUTES SPENT BY ME on the date of service doing chart review, history, exam, documentation & further activities per the note.      Data     I have personally reviewed the following data over the past 24 hrs:    6.1  \   12.4 (L)   / 75 (L)     138 102 24.0 (H) /  150 (H)   4.1 26 0.97 \       ALT: 42 AST: 40 AP: 89 TBILI: 1.5 (H)   ALB: 3.5 TOT PROTEIN: 6.5 LIPASE: N/A       TSH: 9.19 (H) T4: 1.07 A1C: N/A       Imaging results reviewed over the past 24 hrs:   Recent Results (from the past 24 hour(s))   MR Thoracic Spine w/o Contrast    Narrative    MRI THORACIC SPINE WITHOUT CONTRAST  2/21/2023 8:57 AM     HISTORY: Mid back pain, T5-T6 fracture on CT, neurosurgery consult.    TECHNIQUE: Multiplanar multisequence MRI of the thoracic spine without  contrast.    COMPARISON: Thoracic spine CT 2/20/2023.    FINDINGS:  Acute fracture involving the ossified anterior longitudinal ligament  at T5-6 extending into the T5-6 disc space, T6 vertebral body, and  central T6-7 disc joint. Small-volume prevertebral fluid. No pedicle  or posterior element extension. No evidence of spinal canal  hemorrhage. No other fractures are identified.    Mild marrow edema within the right inferior aspect of the T3 vertebral  body is favored to be degenerative in a slightly different location  compared to the lucency described on the prior CT.    Alignment is unchanged. Mild degenerative change is present. No  high-grade stenoses. Artifact related to posterior element hardware at  T11-T12 at the inferior field of view. No abnormal cord signal.    Hiatal hernia. Presumed subglottic tracheal secretions at the superior  field of view. Presumed pulmonary atelectasis. Small nonspecific T2  hyperintense lesion within the right kidney which is incompletely  characterized but statistically likely benign.      Impression    IMPRESSION:      1. Acute fracture involving the ossified anterior longitudinal  ligament at T5-6 extending into the  T5-6 disc space, T6 vertebral  body, and T6-7 disc joint.   2. No other fractures are identified.  3. Mild edema within the right inferior T3 vertebral body is favored  to be degenerative.    LEANN ASTUDILLO MD         SYSTEM ID:  [id number]

## 2023-02-21 NOTE — PROGRESS NOTES
"PRIMARY DIAGNOSIS: \"GENERIC\" NURSING  OUTPATIENT/OBSERVATION GOALS TO BE MET BEFORE DISCHARGE:  1. ADLs back to baseline: No    2. Activity and level of assistance: Up with maximum assistance. Consider SW and/or PT evaluation.     3. Pain status: Improved-controlled with oral pain medications.    4. Return to near baseline physical activity: No     Discharge Planner Nurse   Safe discharge environment identified: Yes  Barriers to discharge: Yes     Orthodontics came to put the brace. MRI and Echo done (see result). A anO. Ax1. PRn Oxycodone utilized for pain.       Entered by: Jesus Barba RN 02/21/2023 10:42 AM     Please review provider order for any additional goals.   Nurse to notify provider when observation goals have been met and patient is ready for discharge.  "

## 2023-02-21 NOTE — PROVIDER NOTIFICATION
At 0100, pt stated he had burning and heaviness in his chest rating 5/10. Pt stated that this pain was different from reflux pain and pain did radiate to his shoulders and face. Pt also states he has chronic pain in his shoulders. VSS.     Dr. Macario was notified. EKG and nitroglycerin SL ordered.     Pt's chest pain resolved after 2 nitroglycerin. VSS. Pt reported pain had now moved to his gallbladder and shoulders. Dr. Sabillon updated and morphine ordered.

## 2023-02-21 NOTE — CONSULTS
Care Management Initial Consult    General Information  Assessment completed with: Patient,    Type of CM/SW Visit: Initial Assessment    Primary Care Provider verified and updated as needed:     Readmission within the last 30 days:        Reason for Consult: discharge planning  Advance Care Planning: Advance Care Planning Reviewed: no concerns identified          Communication Assessment  Patient's communication style: spoken language (English or Bilingual)    Hearing Difficulty or Deaf: yes   Wear Glasses or Blind: no    Cognitive  Cognitive/Neuro/Behavioral: WDL                      Living Environment:   People in home: alone     Current living Arrangements: house      Able to return to prior arrangements: yes  Living Arrangement Comments: split level home    Family/Social Support:  Care provided by: self  Provides care for: no one  Marital Status: Single  Neighbor          Description of Support System: Supportive, Involved       Current Resources:   Patient receiving home care services: No     Community Resources: None  Equipment currently used at home: grab bar, tub/shower  Supplies currently used at home:      Employment/Financial:  Employment Status: retired        Financial Concerns:         Lifestyle & Psychosocial Needs:  Social Determinants of Health     Tobacco Use: Medium Risk     Smoking Tobacco Use: Former     Smokeless Tobacco Use: Never     Passive Exposure: Not on file   Alcohol Use: Not on file   Financial Resource Strain: Not on file   Food Insecurity: Not on file   Transportation Needs: Not on file   Physical Activity: Not on file   Stress: Not on file   Social Connections: Not on file   Intimate Partner Violence: Not on file   Depression: Not at risk     PHQ-2 Score: 0   Housing Stability: Not on file       Functional Status:  Prior to admission patient needed assistance:   Dependent ADLs:: Independent        Mental Health Status:          Chemical Dependency Status:               Values/Beliefs:  Spiritual, Cultural Beliefs, Congregational Practices, Values that affect care: no               Additional Information:  Referral received for discharge planning.  Initial assessment completed with patient.    Patient is in the process of moving from [facility] to [place].  He is remodeling a split level home.    At baseline, patient walks independently without assistive device.  He is able to do all ADLs on his own, however, he stated that housekeeping is becoming more difficult for him. Patient also interested in Meals on Wheels.  Patient still drives.  He does not receive any home care services.    Discussed discharge options.  Patient declines TCU.  He plans to return home.  He is interested in home care, housekeeping and meal delivery services at discharge.    Home Care referral placed to [facility] [facility].    Patient states he has a neighbor to transport him home at discharge.      NICHOLE Muñoz  [facility] [phone]/ [facility] [phone]  Care Management

## 2023-02-21 NOTE — PROGRESS NOTES
"PRIMARY DIAGNOSIS: \"GENERIC\" NURSING  OUTPATIENT/OBSERVATION GOALS TO BE MET BEFORE DISCHARGE:  1. ADLs back to baseline: Yes    2. Activity and level of assistance: Up with standby assistance.    3. Pain status: Improved but still requiring IV narcotics.    4. Return to near baseline physical activity: No     Discharge Planner Nurse   Safe discharge environment identified: Yes  Barriers to discharge: Yes       Entered by: Elyssa Paul RN 02/21/2023 2:38 AM     Please review provider order for any additional goals.   Nurse to notify provider when observation goals have been met and patient is ready for discharge.    Pt reported chest pain- see provider notification note- PRN morphine and nitro given. Pt is now resting comfortably. VSS. Tele is NSR.   "

## 2023-02-21 NOTE — PROGRESS NOTES
"S: I denver Canelo Logan at the [facility] for fitting of a TLSO spinal brace per Rx from DIANE Blake CNP. Canelo slipped and fell on the ice yesterday morning and sustained t5 and t6 fractures. When I arrived to his room today to fit his brace he was up and just finishing up with brushing his teeth in the bathroom and attended for assistance in this, so it was good timing for fitting the TLSO as he was already standing.  O:  was 5'11\" tall and he weighed 265 lbs. He was diagnosed with t5 and t6 fractures. He has beed prescribed a TLSO brace to reduce motion and improve support and comfort.  A: I have fit an [employer] 464 TLSO p/n [id number], lot no. [id number] per 's instructions. I adjusted the circumference of the belt portion of the brace to the XL setting and the sternal bar to the correct height. I provided verbal and demonstrated instruction on donning and adjustment and left written instructions for the brace in the bag on the chair next to his bed. When Canelo was seated, the sternal bar height was good. Fit and support of the brace appeared to be good.  P: II have provided contact information for the [facility] office at the [facility]. Follow up regarding bracing needs as needed.    ALEKSANDAR Helton, SAVANNAH  "

## 2023-02-21 NOTE — PROGRESS NOTES
"PRIMARY DIAGNOSIS: \"GENERIC\" NURSING  OUTPATIENT/OBSERVATION GOALS TO BE MET BEFORE DISCHARGE:  1. ADLs back to baseline: Yes    2. Activity and level of assistance: Up with standby assistance.    3. Pain status: Improved-controlled with oral pain medications.    4. Return to near baseline physical activity: Yes     Discharge Planner Nurse   Safe discharge environment identified: Yes  Barriers to discharge: Yes       Entered by: Elyssa Paul RN 02/21/2023 6:22 AM     Please review provider order for any additional goals.   Nurse to notify provider when observation goals have been met and patient is ready for discharge.    PRN given X1 for pain. VSS; A&O.  "

## 2023-02-21 NOTE — PROGRESS NOTES
S-(situation): Patient arrives to room 271 via cart from [facility].    B-(background): Fall with compression fractures to T5/T6 vertebra    A-(assessment): Pt is A&O; VSS on room air. LS clear. Neuro intact. Right dorsiflexion weak/ unable to perform- pt states this is baseline. Magnesium will be replaced with AM recheck.      R-(recommendations): Orders reviewed with patient. Will monitor patient per MD orders.     Inpatient nursing criteria listed below were met:    Health care directives status obtained and documented: Yes  VTE ordered/documented: Yes  Skin issues/needs documented:Yes  Isolation addressed and Signage used: Yes  Fall Prevention: Care plan updated Yes Education given and documented Yes  Care Plan initiated and Co-Morbidities added: No- Obs  Education Assessment documented:Yes  Admission Education Documented: Yes  If present CAUTI/CLABI Education done: NA  New medication patient education completed and documented (Possible Side Effects of Common Medications handout): Yes  Allergies Reviewed: Yes  Admission Medication Reconciliation completed: Yes  Home medications if not able to send immediately home with family stored here: NA  Reminder note placed in discharge instructions regarding home meds: NA  Individualized care needs/preferences addressed and charted: Yes  Provider Notified that patient has arrived to the unit: No- Provider saw in ER.

## 2023-02-21 NOTE — H&P
Formerly [facility]    History and Physical - Hospitalist Service       Date of Admission:  2/20/2023    Assessment & Plan      Gucci Logan is a 72 year old male admitted on 2/20/2023.  He sustained traumatic fall with head injury, personal report passing out after fall and on CT found to have acute T5-T6 fracture.  Patient admitted observation status for pain control, PT/SW/Curbside Neurosurgery consultation and clinical monitoring.    # Acute T5-T6 spine fracture:  # History T11-Lumbar spine fusion > 20 years ago:  Conservative pain control: Oxycodone, lidocaine patches, stool softener  Given liver disease, unable to utilize acetaminophen, muscle relaxers.  Will start Lyrica prescription with adjusted dosing.   Bracing himself with spasm, cough, incentive spirometer  Continue home dose vitamin D supplement.  History of vitamin D deficiency. check Vitamin D level.   MRI thoracic spine without contrast to define whether T3 nondisplaced fracture present based on CT findings.  Curbside neurosurgery after MRI.   TLSO brace which was discussed in ED with neurosurgery.  PT/SW consultation.    # Fall, Subsequent Encounter:  # Head Injury:  # Right Parietal Superficial Laceration/Swelling:  CT Head negative with mild parietal scalp swelling without fracture  Negative Cervical Spine, Chest, Abdomen negative for acute findings.   Routine wound cares  Fall Precautions    # Personal Report Syncopal Episode:  Patient reports he may have passed out after traumatic fall and head injury.  Aspirin on med list but he states he does not take it daily.  Head CT negative for acute intracranial findings.  Reasonable syncopal work-up: neuro checks, Cardiac monitoring, EKG, fingerstick blood glucose, orthostatics-if orthostatic positive, contact overnight provider to hold blood pressure medications.  Replace lytes per high replacement protocol  Check ua/uc for work up completion    # Diabetes Mellitus Type  2, Non Insulin Dependent: Hgb A1c 7.0  # Diabetic autonomic neuropathy  Continue home dose Metformin  CHO 45 g per meal  Sliding scale with meals and at bedtime  At discharge resume Semaglutide weekly subcutaneous for weight management.    # Primary Hypertension goal BP < 140/90  Continue home dose Metoprolol, Valsartan, Spironolactone, Hydrocholrothiazide.    # Hyperkalemia, Mild:   Lab report reading as hemolyzed specimen  Recheck K+ in am    # Liver Cirrhosis:  # Portal Vein Thrombosis:  # History Chronic Hepatitis C:  # Lower Extremity Edema, Bilateral, Chronic:  Home dose Spironolactone.  ALT marginally elevated   Compression Asher hose, elevate  CMP in am    # Thrombocytopenia, Chronic: In setting of liver disease  Baseline platelets ~ 50-60  Monitor platelet counts    # Hyperlipidemia:   Home dose statin    # Hypothyroidism:   Last TSH slightly elevated. Recheck. continue home Levothyroxine.     # Splenomegaly, Chronic:     # GERD:  Home dose Omeprazole.    # Morbid Obesity: Body mass index is 36.97 kg/m . encourage healthy meal choices, physical activity per patient tolerance. Weekly Semaglutide as above.    # SHONDA: Encourage CPAP use.    # Arthritis:  # Right Shoulder pain, chronic:   As needed tramadol at home        Diet: Combination Diet Regular Diet; Low Consistent Carb (45 g CHO per Meal) Diet; Low Saturated Fat Na <2400mg Diet    DVT Prophylaxis: Pneumatic Compression Devices and Anti-embolisim stockings (TEDs)  Rankin Catheter: Not present  Lines: None     Cardiac Monitoring: None  Code Status:  Full Code    Clinically Significant Risk Factors Present on Admission     Disposition Plan      Expected Discharge Date: 02/21/2023                The patient's care was discussed with the Attending Physician, Dr. Crews, Patient and Linda Gordon RN.    DIANE Fraire CNP  Hospitalist Service  [facility]  Securely message with DialMyApp (more info)  Text page via ShoutWire  Paging/Directory     ______________________________________________________________________      History is obtained from the patient and electronic health record.    History of Present Illness   Gucci Logan is a 72 year old male who presented to the emergency department after a fall. He thinks he slipped on ice outside his house. He does not recall getting off the ground or walking back in house to call his neighbor who brought him to the emergency department. He thinks he may have blacked out after fall.   CT Thoracic spine positive (in brief) for acute transverse orientedT5-T6 fracture and non specific lucency along the anterior margin of T3 vertebral body, which could represent a prominent endplate  osteophyte. A nondisplaced fracture is difficult to completely exclude.   CT Head negative with mild parietal scalp swelling without fracture  Negative Cervical Spine, Chest, Abdomen negative for acute findings.     ED provider contacted neurosurgery to discuss imaging findings and they recommended TLSO brace.    Past medical history history of lumbar spinal fusion, vitamin D deficiency, diabetes mellitus type 2, non-insulin-dependent, hypertension, SHONDA, liver cirrhosis, portal vein thrombosis, chronic hepatitis C, splenomegaly morbid obesity, arthritis, chronic pain, GERD and hypothyroidism.    Denies chest pain, shortness of breath, cough or chills.  Endorses back pain and spasms mid back with need to catch his breath and brace his back due to pain.   History back spinal fusion > 20 years ago  Diabetes type 2, non insulin use. Admits does not check his blood sugars regularly at home. He does take his Metformin daily. Hgb a1c 7.0 dated 1/18/23  He does not skip home medications.   History right shoulder pain and chronic musculoskeletal pain, not taking gabapentin and tramadol much  And requesting something stronger for back pain.   Given script for Lyrica did not start yet.      Past Medical History    Past  Medical History:   Diagnosis Date     Arthritis      Chronic, continuous use of opioids      Esophageal reflux 03/01/2014     Hearing problem      Hiatal hernia      Hypertension      Jaundice 05/31/2008     Liver disease      Obesity 01/24/2013     Obstructive sleep apnea      Sleep apnea     Advised CPAP machine. Not keen to use it.        Past Surgical History   Past Surgical History:   Procedure Laterality Date     ARTHROSCOPY KNEE RT/LT      (L) with partial medial meniscectomy     CATARACT IOL, RT/LT  Nov and Dec 2017     COLONOSCOPY N/A 4/24/2019    Procedure: COLONOSCOPY, WITH POLYPECTOMY AND BIOPSY;  Surgeon: Leventhal, Thomas Michael, MD;  Location: [facility] OR     COLONOSCOPY N/A 9/14/2022    Procedure: COLONOSCOPY;  Surgeon: Rafa Renee MD;  Location:  GI     DACRYOCYSTORHINOSTOMY Left 10/16/2018    Procedure: DACRYOCYSTORHINOSTOMY;  Surgeon: Madhu Krause MD;  Location: [facility]     ENDOSCOPIC ENDONASAL SURGERY  1994     ENDOSCOPY  2-19-15     ESOPHAGOSCOPY, GASTROSCOPY, DUODENOSCOPY (EGD), COMBINED N/A 4/24/2019    Procedure: COMBINED ESOPHAGOSCOPY, GASTROSCOPY, DUODENOSCOPY (EGD) - hold aspirin ibuprofen or naproxen for one week prior (per physician order);  Surgeon: Leventhal, Thomas Michael, MD;  Location: [facility] OR     EYE SURGERY       Hernia surgery Left 1994     NASAL/SINUS POLYPECTOMY       REPAIR PTOSIS Left 10/16/2018    Procedure: LEFT UPPER LID PTOSIS AND BILATERAL  BROW PTOSIS REPAIR WITH LEFT DACRYOCYSTORHINOSTOMY ;  Surgeon: Madhu Krause MD;  Location: [facility]     REPAIR PTOSIS BROW Bilateral 10/16/2018    Procedure: REPAIR PTOSIS BROW;  Surgeon: Madhu Krause MD;  Location: [facility]     ROTATOR CUFF REPAIR RT/LT Right      ZZC OPEN RX ANKLE DISLOCATN+FIXATN      (R)     ZZC SPINAL FUSION,ANT,EA ADNL LEVEL      T12 - L1       Prior to Admission Medications   Prior to Admission Medications   Prescriptions Last Dose Informant Patient Reported? Taking?    HYDROcodone-acetaminophen (NORCO) 5-325 MG tablet   No No   Sig: Take 1 tablet by mouth every 6 hours as needed for moderate to severe pain   Semaglutide-Weight Management 0.5 MG/0.5ML SOAJ   No No   Sig: Inject 0.25 mg Subcutaneous every 7 days   aspirin (ASA) 81 MG chewable tablet   Yes No   Sig: Take 81 mg by mouth daily   atorvastatin (LIPITOR) 10 MG tablet   No No   Sig: Take 1 tablet (10 mg) by mouth daily   blood glucose (NO BRAND SPECIFIED) lancets standard   No No   Sig: Use to test blood sugar 2 times daily or as directed.   blood glucose (NO BRAND SPECIFIED) test strip   No No   Sig: Use to test blood sugar once time daily or as directed.   blood glucose monitoring (NO BRAND SPECIFIED) meter device kit   No No   Sig: Use to test blood sugar 1 times daily or as directed. Preferred blood glucose meter AND/OR supplies to accompany: Blood Glucose Monitor Brands: per insurance.   cholecalciferol 25 MCG (1000 UT) TABS   Yes No   Sig: Take 1,000 Units by mouth daily 2 tabs daily   gabapentin (NEURONTIN) 300 MG capsule   No No   Sig: Take 1 capsule (300 mg) by mouth 3 times daily   hydrochlorothiazide (HYDRODIURIL) 50 MG tablet   No No   Sig: Take 1 tablet (50 mg) by mouth daily   levothyroxine (SYNTHROID/LEVOTHROID) 25 MCG tablet   No No   Sig: Take 1 tablet (25 mcg) by mouth daily   metFORMIN (GLUCOPHAGE) 500 MG tablet   No No   Sig: Take 1 tablet (500 mg) by mouth 2 times daily (with meals)   metoprolol succinate ER (TOPROL XL) 100 MG 24 hr tablet   No No   Sig: Take 1 tablet (100 mg) by mouth daily   multivitamin w/minerals (THERA-VIT-M) tablet   Yes No   Sig: Take 1 tablet by mouth daily   omeprazole (PRILOSEC) 20 MG DR capsule   No No   Sig: TAKE 1 CAPSULE BY MOUTH ONCE DAILY 30 TO 60 MINUTES BEFORE A MEAL   pregabalin (LYRICA) 25 MG capsule   No No   Si po tid as needed for nerve pain   spironolactone (ALDACTONE) 25 MG tablet   No No   Sig: Take 1 tablet (25 mg) by mouth daily   tamsulosin (FLOMAX)  0.4 MG capsule   No No   Sig: Take 1 capsule (0.4 mg) by mouth daily   traMADol (ULTRAM) 50 MG tablet   No No   Sig: Take 1 tablet (50 mg) by mouth every 6 hours as needed for moderate to severe pain   valsartan (DIOVAN) 160 MG tablet   No No   Sig: Take 1 tablet (160 mg) by mouth daily      Facility-Administered Medications: None        Review of Systems    The 10 point Review of Systems is negative other than noted in the HPI or here.      Physical Exam   Vital Signs: Temp: 98  F (36.7  C) Temp src: Oral BP: (!) 159/80 Pulse: 75   Resp: 18 SpO2: 99 % O2 Device: None (Room air)    Weight: 265 lbs 0 oz    Physical Exam  Vitals and nursing note reviewed.   Constitutional:       General: He is not in acute distress.     Appearance: He is obese.   HENT:      Head: Normocephalic.        Mouth/Throat:      Mouth: Mucous membranes are moist.   Eyes:      Pupils: Pupils are equal, round, and reactive to light.   Neck:      Comments: No mid cervical spine tenderness  Cardiovascular:      Rate and Rhythm: Normal rate and regular rhythm.      Comments: 2/6 systolic murmur  Pulmonary:      Breath sounds: No stridor. No wheezing or rhonchi.      Comments: Lung sounds clear bilaterally via ausculation  Abdominal:      Palpations: Abdomen is soft.      Comments: Obese, slightly firm, non tender, bowel sounds active x 4 quads   Musculoskeletal:        Arms:       Cervical back: Neck supple.      Comments: + 2 pre tibial edema bilaterally   Neurological:      General: No focal deficit present.      Mental Status: He is alert and oriented to person, place, and time.   Psychiatric:         Thought Content: Thought content normal.      Comments: pleasantly talkative         Medical Decision Making   75 MINUTES SPENT BY ME on the date of service doing chart review, history, exam, documentation & further activities per the note.      Data     I have personally reviewed the following data over the past 24 hrs:    7.4  \   13.7   / 77 (L)      139 102 27.1 (H) /  135 (H)   5.2 26 1.16 \       ALT: 55 (H) AST: N/A AP: 111 TBILI: 1.4 (H)   ALB: 4.0 TOT PROTEIN: 7.4 LIPASE: N/A       INR:  1.12 PTT:  27   D-dimer:  N/A Fibrinogen:  N/A       Imaging results reviewed over the past 24 hrs:   Recent Results (from the past 24 hour(s))   CT Chest Abdomen Pelvis w/o Contrast    Narrative    CT CHEST ABDOMEN PELVIS W/O CONTRAST 2/20/2023 2:15 PM    CLINICAL HISTORY: trauma, pain    TECHNIQUE: CT scan of the chest, abdomen, and pelvis was performed  without IV contrast. Multiplanar reformats were obtained. Dose  reduction techniques were used.   CONTRAST: None.    COMPARISON: Ultrasound 7/7/2022, CT 8/9/2021    FINDINGS:   LUNGS AND PLEURA: No focal pulmonary laceration or contusion. No  pleural effusion or pneumothorax.    MEDIASTINUM/AXILLAE: No evidence of acute injury. No suspicious  lymphadenopathy. Moderate hiatal hernia.    CORONARY ARTERY CALCIFICATION: Mild.    HEPATOBILIARY: Cirrhotic morphology of the liver without evidence of  acute injury on this noncontrast exam. Cholelithiasis without evidence  of acute cholecystitis.    PANCREAS: Normal.    SPLEEN: Splenomegaly. No perisplenic hematoma visualized.    ADRENAL GLANDS: Normal.    KIDNEYS/BLADDER: No hydronephrosis or evidence of acute injury.  Unchanged left renal cysts, no specific follow-up recommended. Urinary  bladder is unremarkable.    BOWEL: Diverticulosis in the colon. No acute inflammatory change. No  obstruction. Normal appendix. No mesenteric hematoma or  pneumoperitoneum.    LYMPH NODES: Normal.    VASCULATURE: Moderate calcified atherosclerosis. No evidence of acute  injury on this noncontrast exam. Likely calcified, chronic thrombus  involving the superior mesenteric and main portal vein.    PELVIC ORGANS: Normal.    OTHER: No ascites.    MUSCULOSKELETAL: No acute bony abnormality. Lumbar spinal fusion  hardware again noted. Small fat-containing left inguinal hernia  without evidence  of complication.      Impression    IMPRESSION:  1.  No evidence of acute trauma in the chest, abdomen or pelvis.  2.  Cirrhosis with evidence of portal hypertension. No ascites.  3.  Likely calcified, chronic thrombus involving the superior  mesenteric and main portal vein, better seen on prior  contrast-enhanced CT.    BEATRIS FELICIANO MD         SYSTEM ID:  [id number]   CT Thoracic Spine w/o Contrast    Narrative    CT THORACIC SPINE WITHOUT CONTRAST   2/20/2023 2:16 PM     HISTORY: Trauma.     TECHNIQUE: Axial images of the thoracic spine were obtained without  intravenous contrast. Multiplanar reformations were performed.   Radiation dose for this scan was reduced using automated exposure  control, adjustment of the mA and/or kV according to patient size, or  iterative reconstruction technique.    COMPARISON: None.    FINDINGS: There are findings consistent with diffuse idiopathic  skeletal hyperostosis, including flowing right anterolateral vertebral  ossification/syndesmophytes along the vertebral column extending from  the level of T3 down to T12. There is an atypical primarily  transversely oriented nondisplaced fracture extending through  previously bridging syndesmophyte anterior to the T5-T6 disc space and  also extending through the anterior aspect of the T5-6 disc space and  into the right anterior superior corner of the T6 vertebral body  (series 9 image 47, series 7 image 47). No other definitive fracture  identified. There is a nonspecific vertically oriented lucency on the  anterior aspect of the T3 vertebral body (series 9 image 49, series 7  image 45) which may represent a prominent endplate osteophyte. A  nondisplaced fracture is not completely excluded.    No other findings concerning for recent fracture of the thoracic  spine. Sagittal alignment is normal. There is mild sigmoid curvature  with dominant levoconvex curve centered at T3-T4. Mild multilevel  degenerative disc height loss in the  thoracic region. Mild scattered  degenerative facet disease. No high-grade thoracic spinal canal  stenosis identified. There appears to be at least moderate right T2-T3  neural foraminal stenosis and mild/mild to moderate neural foraminal  narrowing elsewhere. Partial visualization of posterior fusion  instrumentation associated with the spinous processes at the T11-T12  level.    Coronary artery calcifications are noted. A hiatal hernia is present.  Somewhat nodular surface contour of the liver, concerning for possible  cirrhosis. Please see separate report from CT of the chest, abdomen  and pelvis of same day for additional details regarding extraspinal  findings. Partially visualized multiple gallstones.      Impression    IMPRESSION:  1. Acute transversely oriented fracture through an anterior T5-T6  bridging syndesmophyte and extending through the T5-T6 disc space and  the superior aspect of the T6 vertebral body. This is seen in the  setting of diffuse idiopathic skeletal hyperostosis.  2. Nonspecific vertically oriented lucency along the anterior margin  of the T3 vertebral body, which could represent a prominent endplate  osteophyte. A nondisplaced fracture is difficult to completely  exclude. MRI could be considered for further characterization if it  would change the patient's clinical management.  3. Degenerative changes without evidence for high-grade spinal canal  stenosis in the thoracic region.  4. Partially visualized posterior fusion instrumentation extending  from T11 into the lumbar region.  5. Please see the separate report from CT chest, abdomen and pelvis of  same session for additional details.     ANEESH SCOTT MD         SYSTEM ID:  [id number]   CT Lumbar Spine w/o Contrast    Narrative    CT LUMBAR SPINE WITHOUT CONTRAST  2/20/2023 2:17 PM     HISTORY: Trauma.      TECHNIQUE: Axial images of the lumbar spine were obtained without  intravenous contrast. Multiplanar reformations were  performed.   Radiation dose for this scan was reduced using automated exposure  control, adjustment of the mA and/or kV according to patient size, or  iterative reconstruction technique.     COMPARISON: CT of the chest, abdomen and pelvis of same day.  Correlation is also made with CT abdomen and pelvis dated 8/9/2021.     FINDINGS: Nomenclature is based on five lumbar vertebral bodies. No  definite acute lumbar spine fracture is identified. Mild degenerative  retrolisthesis of L2 on L3 and L3 on L4. Mild dextroconvex curvature  with apex near the thoracolumbar junction.    There is at least partial marginal fusion across the T11-T12, T12-L1  and L1-L2 disc spaces. There is hardware involving the spinous  processes extending from T11 to L2, and there appears to be solid  osseous fusion of the posterior elements from T11 down to L2. There is  mild/mild to moderate disc height loss from L2-L3 through L5-S1 with  scattered vacuum disc phenomenon. Marginal endplate osteophytes and  moderate to severe multilevel degenerative facet disease. Probable  bone graft harvest site along the posterior aspect of the right iliac  bone.    Multilevel disc bulges with posterior endplate osteophytic ridging.  There appears to be up to moderate to severe spinal canal stenosis at  the L4-L5 level with lesser degrees of lumbar spinal canal narrowing  elsewhere. Moderate to severe right L4-L5 neural foraminal stenosis.  There is at least moderate right L3-L4 and L5-S1 neural foraminal  stenosis. At least moderate left L3-L4, L4-L5 and L5-S1 neural  foraminal stenosis.    Partially visualized gallstone and nodular contour of the liver. The  spleen appears probably enlarged. Scattered atherosclerotic  calcifications of the aortoiliac arteries. Please see separate report  from CT of the chest, abdomen and pelvis of same day for details  regarding extraspinal findings.      Impression    IMPRESSION:  1. No acute lumbar spine fracture.  2.  Posterior instrumented fusion from T11 to L2.  3. Multilevel degenerative change, as described. There appears to be  up to moderate to severe spinal canal stenosis at L4-L5. Multilevel  neural foraminal stenosis, moderate to severe on the right at L4-L5  and at least moderate at multiple additional levels, as described.     ANEESH SCOTT MD         SYSTEM ID:  [id number]   CT Head w/o Contrast    Narrative    CT SCAN OF THE HEAD WITHOUT CONTRAST February 20, 2023 2:18 PM     HISTORY: Trauma.    TECHNIQUE: Axial images of the head and coronal reformations without  IV contrast material. Radiation dose for this scan was reduced using  automated exposure control, adjustment of the mA and/or kV according  to patient size, or iterative reconstruction technique.    COMPARISON: None.    FINDINGS: There is no evidence of intracranial hemorrhage, mass, acute  infarct or anomaly. The ventricles are normal in size and  configuration. Mild to moderate generalized brain parenchymal volume  loss. Mild patchy nonspecific hypoattenuation in the cerebral white  matter, most likely due to chronic small vessel ischemic disease.     Mild right parietal scalp swelling without underlying calvarial  fracture. This may be posttraumatic in nature. The visualized aspects  of the paranasal sinuses are free of significant disease. The mastoid  and middle ear cavities appear clear. The bony calvarium and bones of  the skull base appear intact. Degenerative changes of the  craniocervical junction are partially visualized.      Impression    IMPRESSION:  1. No CT findings of acute intracranial process.  2. Brain atrophy and presumed chronic small vessel ischemic changes,  as described.  3. Mild right parietal scalp swelling without underlying calvarial  fracture.    ANEESH SCOTT MD         SYSTEM ID:  [id number]   CT Cervical Spine w/o Contrast    Narrative    CT CERVICAL SPINE WITHOUT CONTRAST February 20, 2023 2:18 PM     HISTORY: Trauma.      TECHNIQUE: Axial images of the cervical spine were obtained without  intravenous contrast. Multiplanar reformations were performed.  Radiation dose for this scan was reduced using automated exposure  control, adjustment of the mA and/or kV according to patient size, or  iterative reconstruction technique.    COMPARISON: None.    FINDINGS: No acute cervical spine fracture is identified. There is  straightening of the normal cervical lordosis, which can be  positional. There is mild dextroconvex curvature with apex at C6-C7.  No posttraumatic subluxation is identified. Incompletely fused  bilateral transverse processes at T1.    Mild to moderate disc height loss at C5-C6 and C6-C7 with marginal  endplate osteophytes. Scattered uncovertebral spurring. Multilevel  disc osteophyte complexes. Degenerative facet disease, most advanced  on the left at C2-C3 and to a lesser degree elsewhere bilaterally in  the upper to mid cervical spine. Degenerative changes of the median  atlantoaxial joint.    There appears to be moderate to severe spinal canal stenosis at  multiple levels, probably most pronounced at the C5-C6 level but also  significant at the C3-C4 level and to a slightly lesser degree at  C4-C5 and C6-C7. Varying degrees of multilevel degenerative neural  foraminal stenosis, moderate to severe on the left at C5-C6 and at  least moderate at multiple other levels.    Subcentimeter hypodense lesion in the right thyroid lobe without  aggressive features, not necessarily requiring follow-up in patient of  this age by ACR imaging criteria. Bilateral carotid bifurcation  atherosclerotic calcification. The visualized paraspinous soft tissues  otherwise appear unremarkable.      Impression    IMPRESSION:  1. No acute fracture or posttraumatic malalignment of the cervical  spine.  2. Multilevel degenerative changes, as described.  3. Moderate/severe multilevel spinal canal and neural foraminal  stenosis related to  degenerative changes.    ANEESH SCOTT MD         SYSTEM ID:  [id number]

## 2023-02-22 ENCOUNTER — APPOINTMENT (OUTPATIENT)
Dept: OCCUPATIONAL THERAPY | Facility: CLINIC | Age: 73
End: 2023-02-22
Payer: COMMERCIAL

## 2023-02-22 ENCOUNTER — TELEPHONE (OUTPATIENT)
Dept: FAMILY MEDICINE | Facility: CLINIC | Age: 73
End: 2023-02-22
Payer: COMMERCIAL

## 2023-02-22 ENCOUNTER — APPOINTMENT (OUTPATIENT)
Dept: PHYSICAL THERAPY | Facility: CLINIC | Age: 73
End: 2023-02-22
Payer: COMMERCIAL

## 2023-02-22 ENCOUNTER — APPOINTMENT (OUTPATIENT)
Dept: CT IMAGING | Facility: CLINIC | Age: 73
End: 2023-02-22
Attending: NURSE PRACTITIONER
Payer: COMMERCIAL

## 2023-02-22 ENCOUNTER — APPOINTMENT (OUTPATIENT)
Dept: GENERAL RADIOLOGY | Facility: CLINIC | Age: 73
End: 2023-02-22
Attending: NURSE PRACTITIONER
Payer: COMMERCIAL

## 2023-02-22 LAB
GLUCOSE BLDC GLUCOMTR-MCNC: 151 MG/DL (ref 70–99)
GLUCOSE BLDC GLUCOMTR-MCNC: 151 MG/DL (ref 70–99)
GLUCOSE BLDC GLUCOMTR-MCNC: 160 MG/DL (ref 70–99)
GLUCOSE BLDC GLUCOMTR-MCNC: 175 MG/DL (ref 70–99)
HOLD SPECIMEN: NORMAL
MAGNESIUM SERPL-MCNC: 1.9 MG/DL (ref 1.7–2.3)

## 2023-02-22 PROCEDURE — 250N000013 HC RX MED GY IP 250 OP 250 PS 637: Performed by: NURSE PRACTITIONER

## 2023-02-22 PROCEDURE — 82962 GLUCOSE BLOOD TEST: CPT

## 2023-02-22 PROCEDURE — G0378 HOSPITAL OBSERVATION PER HR: HCPCS

## 2023-02-22 PROCEDURE — 97535 SELF CARE MNGMENT TRAINING: CPT | Mod: GO

## 2023-02-22 PROCEDURE — 83735 ASSAY OF MAGNESIUM: CPT | Performed by: NURSE PRACTITIONER

## 2023-02-22 PROCEDURE — 73030 X-RAY EXAM OF SHOULDER: CPT | Mod: RT

## 2023-02-22 PROCEDURE — 70450 CT HEAD/BRAIN W/O DYE: CPT

## 2023-02-22 PROCEDURE — 36415 COLL VENOUS BLD VENIPUNCTURE: CPT | Performed by: NURSE PRACTITIONER

## 2023-02-22 PROCEDURE — 97166 OT EVAL MOD COMPLEX 45 MIN: CPT | Mod: GO

## 2023-02-22 PROCEDURE — 97530 THERAPEUTIC ACTIVITIES: CPT | Mod: GP | Performed by: PHYSICAL THERAPIST

## 2023-02-22 PROCEDURE — 99232 SBSQ HOSP IP/OBS MODERATE 35: CPT | Performed by: NURSE PRACTITIONER

## 2023-02-22 RX ORDER — METHOCARBAMOL 500 MG/1
500 TABLET, FILM COATED ORAL EVERY 8 HOURS PRN
Status: DISCONTINUED | OUTPATIENT
Start: 2023-02-22 | End: 2023-02-23 | Stop reason: HOSPADM

## 2023-02-22 RX ORDER — OXYCODONE HYDROCHLORIDE 5 MG/1
5-10 TABLET ORAL EVERY 4 HOURS PRN
Status: DISCONTINUED | OUTPATIENT
Start: 2023-02-22 | End: 2023-02-23 | Stop reason: HOSPADM

## 2023-02-22 RX ADMIN — HYDROCHLOROTHIAZIDE 50 MG: 25 TABLET ORAL at 09:30

## 2023-02-22 RX ADMIN — VALSARTAN 160 MG: 80 TABLET, FILM COATED ORAL at 09:31

## 2023-02-22 RX ADMIN — METHOCARBAMOL 500 MG: 500 TABLET ORAL at 22:49

## 2023-02-22 RX ADMIN — MULTIPLE VITAMINS W/ MINERALS TAB 1 TABLET: TAB at 11:24

## 2023-02-22 RX ADMIN — METFORMIN HYDROCHLORIDE 500 MG: 500 TABLET ORAL at 18:40

## 2023-02-22 RX ADMIN — LEVOTHYROXINE SODIUM 25 MCG: 25 TABLET ORAL at 09:29

## 2023-02-22 RX ADMIN — PREGABALIN 25 MG: 25 CAPSULE ORAL at 14:32

## 2023-02-22 RX ADMIN — METFORMIN HYDROCHLORIDE 500 MG: 500 TABLET ORAL at 09:29

## 2023-02-22 RX ADMIN — PREGABALIN 25 MG: 25 CAPSULE ORAL at 09:28

## 2023-02-22 RX ADMIN — TAMSULOSIN HYDROCHLORIDE 0.4 MG: 0.4 CAPSULE ORAL at 09:30

## 2023-02-22 RX ADMIN — Medication 25 MCG: at 09:30

## 2023-02-22 RX ADMIN — METOPROLOL SUCCINATE 100 MG: 100 TABLET, EXTENDED RELEASE ORAL at 09:29

## 2023-02-22 RX ADMIN — OXYCODONE HYDROCHLORIDE 7.5 MG: 5 TABLET ORAL at 00:16

## 2023-02-22 RX ADMIN — PREGABALIN 25 MG: 25 CAPSULE ORAL at 20:59

## 2023-02-22 RX ADMIN — ATORVASTATIN CALCIUM 10 MG: 10 TABLET, FILM COATED ORAL at 09:29

## 2023-02-22 RX ADMIN — OXYCODONE HYDROCHLORIDE 5 MG: 5 TABLET ORAL at 22:49

## 2023-02-22 RX ADMIN — PANTOPRAZOLE SODIUM 40 MG: 40 TABLET, DELAYED RELEASE ORAL at 09:29

## 2023-02-22 RX ADMIN — METHOCARBAMOL 500 MG: 500 TABLET ORAL at 14:36

## 2023-02-22 RX ADMIN — SPIRONOLACTONE 25 MG: 25 TABLET, FILM COATED ORAL at 09:29

## 2023-02-22 ASSESSMENT — ACTIVITIES OF DAILY LIVING (ADL)
ADLS_ACUITY_SCORE: 31
ADLS_ACUITY_SCORE: 28
ADLS_ACUITY_SCORE: 28
ADLS_ACUITY_SCORE: 31
ADLS_ACUITY_SCORE: 28
ADLS_ACUITY_SCORE: 28

## 2023-02-22 NOTE — PROGRESS NOTES
S-(situation): Shift note    B-(background): Fall, T6 Fx    A-(assessment): Pt given 2 doses 7.5mg Oxycodone this shift.  Ambulating with TLSO brace to BR.      R-(recommendations): Needs more TLSO Brace teaching before discharge.  Lives home alone

## 2023-02-22 NOTE — TELEPHONE ENCOUNTER
Reason for Call:  Other call back    Detailed comments: patient calledn and is currently at [facility] for back pain.    Would like to see what Clint Jiang at [facility] wants for next steps and questions before scheduling an appointment.    Please contact patient today.  Thank you.    Phone Number Patient can be reached at: Cell number on file:    Telephone Information:   Mobile [phone]       Best Time: any    Can we leave a detailed message on this number? YES    Call taken on 2/22/2023 at 9:04 AM by Monica Hughes

## 2023-02-22 NOTE — PROGRESS NOTES
Care Management Follow Up    Length of Stay (days): 0    Expected Discharge Date: 02/23/2023     Concerns to be Addressed:       Patient plan of care discussed at interdisciplinary rounds: Yes    Anticipated Discharge Disposition: Home, Home Care - [facility] (Phone: [phone])     Anticipated Discharge Services: Housekeeping/[employer], Meals on Wheels    Anticipated Discharge DME:      Patient/family educated on Medicare website which has current facility and service quality ratings: yes    Education Provided on the Discharge Plan:    Patient/Family in Agreement with the Plan: yes    Referrals Placed by CM/SW: Internal Clinic Care Coordination, Homecare    Private pay costs discussed: Not applicable    Additional Information:  Met with patient to re-discuss discharge plan.      Explained that patient has been accepted by [facility] (Phone: [phone]) for RN, PT/OT.  Also provided patient with Meals on Wheels info and [employer] line.    Patient now stating he can't do anything for himself, he can't do stairs, etc.  Patient now wanting to go to a nursing home.   Explained to patient that he would need to qualify for nursing home.  Explained that referrals can be sent to determine if he qualifies.    Patient requesting referrals be sent to the following facilities:    - [facility] (Main Phone: [phone] Admissions Phone: [phone] Fax: [phone])    -[facility] (Phone: [phone] Fax: [phone])    -[facility] (Admissions phone: [phone] Main Phone: [phone] Fax: [phone])    Referrals sent.  Patient states he knows people who will bring him to TCU.    NICHOLE Muñoz  [facility] [phone]/ [place] [phone]  Care Management

## 2023-02-22 NOTE — UTILIZATION REVIEW
"Concurrent stay review; Secondary Review Determination      Under the authority of the Utilization Management Committee, the utilization review process indicated a secondary review on the above patient. The review outcome is based on review of the medical records, discussions with staff, and applying clinical experience noted on the date of the review.      (x) Observation Status Appropriate - Concurrent stay review      RATIONALE FOR DETERMINATION:       Per provider note:  \"72 year old male admitted on 2/20/2023.  He sustained traumatic fall with head injury, personal report passing out after fall and on CT found to have acute T5-T6 fracture.  Patient admitted observation status for pain control, PT/SW/Curbside Neurosurgery consultation and clinical monitoring.\"    Vital signs unremarkable    Labs unremarkable    Head CT shows no acute findings    Thoracic spine CT shows acute fx of T5-T6    Neurosurgery was consulted and non-operative manangement recommended.      Symptoms are being managed without the need for IV medications    TCU being considered on discharge.    Patient is clinically improving and there is no clear indication to change patient's status to inpatient. The severity of illness, intensity of service provided, expected LOS and risk for adverse outcome make the care appropriate for observation.      The information on this document is developed by the utilization review team in order for the business office to ensure compliance. This only denotes the appropriateness of proper admission status and does not reflect the quality of care rendered.   The definitions of Inpatient Status and Observation Status used in making the determination above are those provided in the CMS Coverage Manual, Chapter 1 and Chapter 6, section 70.4.      Sincerely,      Fracisco Taylor MD  Utilization Review   Physician Advisor   [facility].      "

## 2023-02-22 NOTE — PROGRESS NOTES
"PRIMARY DIAGNOSIS: \"GENERIC\" NURSING  OUTPATIENT/OBSERVATION GOALS TO BE MET BEFORE DISCHARGE:  1. ADLs back to baseline: No    2. Activity and level of assistance: Up with standby assistance.    3. Pain status: Improved-controlled with oral pain medications.    4. Return to near baseline physical activity: No     Discharge Planner Nurse   Safe discharge environment identified: Yes  Barriers to discharge: Yes              Entered by: Nathaniel Britton RN 02/22/2023 12:02 AM     Please review provider order for any additional goals.   Nurse to notify provider when observation goals have been met and patient is ready for discharge.  "

## 2023-02-22 NOTE — PROGRESS NOTES
02/22/23 0900   Appointment Info   Signing Clinician's Name / Credentials (OT) Piedad Rut, OTR/L       Present no   Living Environment   People in Home alone   Current Living Arrangements house   Home Accessibility stairs to enter home;stairs within home   Number of Stairs, Main Entrance 2   Stair Railings, Main Entrance none   Number of Stairs, Within Home, Primary six   Stair Railings, Within Home, Primary railing on right side (ascending)   Transportation Anticipated family or friend will provide   Self-Care   Usual Activity Tolerance moderate   Current Activity Tolerance fair   Regular Exercise No   Equipment Currently Used at Home grab bar, tub/shower   Fall history within last six months yes   Number of times patient has fallen within last six months 3   General Information   Onset of Illness/Injury or Date of Surgery 02/21/23   Referring Physician Yaya Vaca, DIANE CNP   Patient/Family Therapy Goal Statement (OT) Unclear on goals patient looking for from therapy perspective   Existing Precautions/Restrictions fall;spinal  (TLSO)   Limitations/Impairments safety/cognitive   Left Upper Extremity (Weight-bearing Status) full weight-bearing (FWB)   Right Upper Extremity (Weight-bearing Status) full weight-bearing (FWB)   Left Lower Extremity (Weight-bearing Status) full weight-bearing (FWB)   Right Lower Extremity (Weight-bearing Status) full weight-bearing (FWB)   General Observations and Info Porsche is a 72 year old male, registered OBSERVATION status from the ED due to traumatic fall with head injury, found to have T5-T6 fracture. Patient with a previous medical history of DM type 2, [id number] fusion, HTN, liver cirrhosis, LE edema, HLD, splenomegaly, GERD, obesity, SHONDA, chroinc right shoulder pain   Cognitive Status Examination   Orientation Status orientation to person, place and time   Cognitive Status Comments Patient alert and oriented to situation, however, combative  with recommendations provided in therapy assessment. Patient unable to problem solve through solutions with writer and having increased irriations with further recommendations. Noted to have poor impulse control and saftey concerns from cognitive perspective. Therapist trying to explain saftey concerns noted with transfers vs current home set up and ability to complete ADLs in safe manner but patient quick to deny needs for AE. Later reporting to PT desire for shoe horn for LB dressing.   Visual Perception   Impact of Vision Impairment on Function (Vision) Dizzy upons sitting- nastagmus noted x8-10 seconds before resolved in seated position. Cues to maintain seated position until dizziness resolves   Sensory   Sensory Quick Adds sensation intact   Sensory Comments UEs- reporting baseline LE neuropathy   Pain Assessment   Patient Currently in Pain Yes, see Vital Sign flowsheet   Posture   Posture not impaired   Range of Motion Comprehensive   General Range of Motion no range of motion deficits identified   Strength Comprehensive (MMT)   Comment, General Manual Muscle Testing (MMT) Assessment Unable to test due to bracing- able to assist with UE's wbing to complete sit <> stand. Denies changes in strength   Muscle Tone Assessment   Muscle Tone Quick Adds No deficits were identified   Coordination   Upper Extremity Coordination No deficits were identified   Gross Motor Coordination No deficits were identified   Fine Motor Coordination No deficits were identified   Bed Mobility   Bed Mobility supine-sit;sit-supine   Supine-Sit [place] (Bed Mobility) modified independence   Sit-Supine [place] (Bed Mobility) modified independence   Assistive Device (Bed Mobility) bed rails   Transfers   Transfers sit-stand transfer;toilet transfer   Sit-Stand Transfer   Sit-Stand [place] (Transfers) modified independence  (FWW)   Sit/Stand Transfer Comments Denial for need for walker but complaints of dizziness. Max cues to  use walker for mobility in room    Toilet Transfer   Type (Toilet Transfer) sit-stand;stand-sit   [place] Level (Toilet Transfer) modified independence   Assistive Device (Toilet Transfer) raised toilet seat   Toilet Transfer Comments Attempts for replicating toilet transfers with set-up of bathroom routine. Patient clear on his needs for supports to make home bathroom safe. Stating he needs grab bars but does not have support to install them- then reporting having friend who can potentiall assist with getting some AE in place for improved saftey in the bathroom.   Activities of Daily Living   BADL Assessment/Intervention upper body dressing;lower body dressing;grooming;toileting   Upper Body Dressing Assessment/Training   Comment, (Upper Body Dressing) Reviewed and completing UE dressing needs with bracing   Lower Body Dressing Assessment/Training   Comment, (Lower Body Dressing) Reviewed and completing LE dressing in unspportive seated position EOB. Reviewed potential AE needs- patietn attempting but denied needs. Would benefit from assistance for ADLs and/or further recommendations for in home setting to promote safe engagement in natural enviroment   Grooming Assessment/Training   Comment, (Grooming) Able to complete in standing position at sink level with FWW   Toileting   Position (Toileting) unsupported sitting   [place] Level (Toileting) modified independence   Clinical Impression   Criteria for Skilled Therapeutic Interventions Met (OT) Evaluation only;No problems identified which require skilled intervention   OT Diagnosis Impaired ADLs   ADL comments/analysis Patient able to complete ADLs with increased time and verbal prompts to maintain percautions. Patient denied AE needs to support safe engagement given need to wear brace. Denial of options for suggestions on ADL engagement. Handout reviewed/issued for AE options.   Identified Performance Deficits IADL management   Clinical Decision Making  Complexity (OT) moderate complexity   Risk & Benefits of therapy have been explained evaluation/treatment results reviewed;care plan/treatment goals reviewed;risks/benefits reviewed;current/potential barriers reviewed;participants voiced agreement with care plan;participants included;patient   OT Total Evaluation Time   OT Eval, Moderate Complexity Minutes (75220) 30   Interventions   Interventions Quick Adds Self-Care/Home Management   Self-Care/Home Management   Self-Care/Home Mgmt/ADL, Compensatory, Meal Prep Minutes (62119) 26   Symptoms Noted During/After Treatment (Meal Preparation/Planning Training) increased pain   Treatment Detail/Skilled Intervention OT: Attempting to complete simulated ADLs required for safe engagement in home setting given TLSO percuations. Patient quick to report being fearful of falling- OT recommending completing ADLs in seated position as much as possible to reduce risk. Issued handout and reviewed AE needs to assist with engagement.  Quick to deny options with AE needs. verbal prompts for getting bracing on/off with writer in seated position. Frusterated with bracing and R shoulder pain wtih reaching to locate bracing. Completing ADLs at sink and simulated toilet transfer in bathroom. Transfers and standing balance  with SBA and using FWW   [place] Level (Grooming Training) contact guard   Assistance (Grooming Training)   (FWW- minimal cues to assist with simulated completion for home enviorment. Denial of need for further assistance)   Assistance (Upper Body Dressing Training) set-up required   Lower Body Dressing Training Assistance stand-by assist   Lower Body Dressing Training Assistance verbal cues   Lower Body Dressing Training Assistance - Assistive Device   (Denial for needs)   Assistance (Toilet Training) verbal cues   Toilet Training Assistance - Assistive Device wall grab bar   OT Discharge Planning   OT Plan No fruther skilled IP OT needs at this time; denial of  trial for AE   OT Discharge Recommendation (DC Rec) home with home care occupational therapy   OT Rationale for DC Rec Patient previously from home alone-unclear about supports in home setting. Does not use AE at baseline. Previously IND in ADLs, currently demonstrates modified independence with ADLs, completing ambulation with FWW, transfers and mobility at SBA. Denial of use for AE and patient problem solving through ways to complete ADLs with modified ADLs given his current needs. Anticipate would benefit from HHOT services once discharged to support safe engagement in ADLs and IADL tasks in home setting. Patient working with SW to get supports for home and meal management.   OT Brief overview of current status Denial for AE interventions despite trials. mod IND for ADLs demonstrated, using FWW for mobilty with SBA, standing balance at sink x4 minute with SBA.   Total Session Time   Timed Code Treatment Minutes 26   Total Session Time (sum of timed and untimed services) 56     Thank you for the referral.   DANY Paris/L   [facility]    Email: [email or web address]  Phone: [phone]

## 2023-02-22 NOTE — TELEPHONE ENCOUNTER
Patient is currently still in the hospital and under their cares.  While he is there, they will help make recommendations on aftercare.  We recommend that patient schedule a hospital f/u with primary care once he is discharged home    Sigifredo Hinds RN  [facility]

## 2023-02-22 NOTE — PROGRESS NOTES
"PRIMARY DIAGNOSIS: \"GENERIC\" NURSING  OUTPATIENT/OBSERVATION GOALS TO BE MET BEFORE DISCHARGE:  1. ADLs back to baseline: No    2. Activity and level of assistance: Up with standby assistance.    3. Pain status: Improved-controlled with oral pain medications.    4. Return to near baseline physical activity: No     Discharge Planner Nurse   Safe discharge environment identified: Yes  Barriers to discharge: Yes              Entered by: Nathaniel Britton RN 02/22/2023 4:04 AM     Please review provider order for any additional goals.   Nurse to notify provider when observation goals have been met and patient is ready for discharge.  "

## 2023-02-22 NOTE — PROGRESS NOTES
[facility]    Medicine Progress Note - Hospitalist Service    Date of Admission:  2/20/2023    Assessment & Plan   Gucci Logan is a 72 year old male admitted on 2/20/2023.  He sustained traumatic fall with head injury, personal report passing out after fall and on CT found to have acute T5-T6 fracture.  Patient admitted observation status for pain control, PT/SW/Curbside Neurosurgery consultation and clinical monitoring.     # Acute T5-T6 spine fracture:  # History T11-Lumbar spine fusion > 20 years ago:  Conservative pain control: Oxycodone, lidocaine patches, stool softener. Add Robaxin for muscle spasms mid back and C-spine   MRI T-spine Acute fracture involving the ossified anterior longitudinal  ligament at T5-6 extending into the T5-6 disc space, T6 vertebral  body, and T6-7 disc joint.   TLSO brace which was discussed in ED with neurosurgery.   DW PT. He is able to place TLSO himself     # Fall, Subsequent Encounter:  # Head Injury:  # Right Parietal Superficial Laceration/Swelling:  CT Head negative no bleed, mild parietal scalp swelling without fracture  Negative Cervical Spine, Chest, Abdomen negative for acute findings.   Routine wound cares  Fall Precautions     # Personal Report Syncopal Episode:  Patient reports he may have passed out after traumatic fall and head injury.  Initial Head CT negative for acute intracranial findings.  Continues to have dizziness. Repeat head CT due to CHI and thrombocytopenia to R/O slow IC bleed       # Diabetes Mellitus Type 2, Non Insulin Dependent: Hgb A1c 7.0  # Diabetic autonomic neuropathy  AM FSBS 175 mg%   Continue home dose Metformin  CHO 45 g per meal  Sliding scale with meals and at bedtime  At discharge resume Semaglutide weekly     # Primary Hypertension goal BP < 140/90  Continue home dose Metoprolol, Valsartan, Spironolactone, Hydrocholrothiazide.     # Hyperkalemia, Mild:-resloved   Lab report reading as hemolyzed  specimen  K+ 4.1      # Liver Cirrhosis:  # Portal Vein Thrombosis:  # History Chronic Hepatitis C:  # Lower Extremity Edema, Bilateral, Chronic:  Home dose Spironolactone.  ALT marginally elevated   Compression Asher hose, elevate     # Thrombocytopenia, Chronic: In setting of liver disease  Baseline platelets ~ 50-60  Plts 75. No bleeding      # Hyperlipidemia:   Home dose statin     # Hypothyroidism:   Last TSH slightly elevated. Recheck. continue home Levothyroxine.      # Splenomegaly, Chronic: Secondary to cirrhosis     # GERD:  Home dose Omeprazole.     # Class II obesity: Body mass index is 36.97 kg/m . encourage healthy meal choices, physical activity per patient tolerance.     # SHONDA: Encourage CPAP use.     # Arthritis:  # Right Shoulder pain, chronic:   As needed tramadol at home.     # Right shoulder and C-spine pain.   X-ray right shoulder   Reviewed CT of  C-spine done in ED. Pt has multilevel degenerative changes C3-C7 as well as moderate-severe spinal stenosis C3-C7 I suspect fall aggravated his degenerative C-spine disease.   Continue pain management     Head CT done for continued vertigo-negative. Right shoulder showed mild joint arthrosis. No fx or dislocation. I updated the pt regarding radiology results.          Diet: Combination Diet Regular Diet; Low Consistent Carb (45 g CHO per Meal) Diet; Low Saturated Fat Na <2400mg Diet    DVT Prophylaxis: Pneumatic Compression Devices, Anti-embolisim stockings (TEDs) and Thrombocytopenia   Rankin Catheter: Not present  Lines: None     Cardiac Monitoring: ACTIVE order. Indication: Tachyarrhythmias, acute (48 hours)  Code Status: Full Code      Clinically Significant Risk Factors Present on Admission                # Thrombocytopenia: Lowest platelets = 75 in last 2 days, will monitor for bleeding   # Hypertension: home medication list includes antihypertensive(s)     # DMII: A1C = 7.0 % (Ref range: 0.0 - 5.6 %) within past 6 months    # Obesity: Estimated  "body mass index is 36.97 kg/m  as calculated from the following:    Height as of this encounter: 1.803 m (5' 11\").    Weight as of this encounter: 120.2 kg (265 lb 1.6 oz).           Disposition Plan        Expected Discharge Date: 02/23/2023      Destination: home with help/services  Discharge Comments: possible head CT within next 48 hours, dizzy, head injury. TLSO, probably TCU, patient wants to go home but doesn't have support at home.        The patient's care was discussed with the Attending Physician, Dr. Gilbert  and Patient.    DIANE Price [facility]  Hospitalist Service  [facility]  Securely message with [employer] (more info)  Text page via [facility] Paging/Directory   ______________________________________________________________________    Interval History   Reviewed overnight notes, no significant events     Physical Exam   Vital Signs: Temp: 97.4  F (36.3  C) Temp src: Oral BP: 129/62 Pulse: 94   Resp: 16 SpO2: 94 % O2 Device: None (Room air)    Weight: 265 lbs 1.6 oz    Constitutional: 71 yo male in bed, brace on, on RA and not in acute distress  Eyes: sclera clear and conjunctiva normal  Hematologic / Lymphatic: No bleeding or bruising   Respiratory: No increased work of breathing, good air exchange, clear to auscultation bilaterally, no crackles or wheezing  Cardiovascular: Normal apical impulse, regular rate and rhythm, normal S1 and S2, no S3 or S4, and no murmur noted  GI: hyperactive bowel sounds, soft, non-distended and non-tender  Skin: no bruising or bleeding, normal skin color, texture, turgor, no redness, warmth, or swelling and no rashes  Musculoskeletal: no lower extremity pitting edema present  there is no redness, warmth, or swelling of the joints  full range of motion noted  Neurologic: A&O x4, No focal deficits. No nystagmus observed   Neuropsychiatric: General: normal, calm and normal eye contact  Level of consciousness: alert / normal  Affect: " normal  Orientation: oriented to self, place, time and situation  Memory and insight: normal, memory for past and recent events intact and thought process normal    Medical Decision Making       45 MINUTES SPENT BY ME on the date of service doing chart review, history, exam, documentation & further activities per the note.      Data       Imaging results reviewed over the past 24 hrs:   No results found for this or any previous visit (from the past 24 hour(s)).

## 2023-02-22 NOTE — PROGRESS NOTES
"PRIMARY DIAGNOSIS: \"GENERIC\" NURSING  OUTPATIENT/OBSERVATION GOALS TO BE MET BEFORE DISCHARGE:  1. ADLs back to baseline: No    2. Activity and level of assistance: Up with standby assistance.    3. Pain status: Improved-controlled with oral pain medications.    4. Return to near baseline physical activity: No     Discharge Planner Nurse   Safe discharge environment identified: Yes  Barriers to discharge: Yes              Entered by: Jesus Barba RN 02/21/2023 6:15 PM     Please review provider order for any additional goals.   Nurse to notify provider when observation goals have been met and patient is ready for discharge.  "

## 2023-02-22 NOTE — PLAN OF CARE
"Goal Outcome Evaluation:      Plan of Care Reviewed With: patient    Overall Patient Progress: improvingOverall Patient Progress: improving    Outcome Evaluation: pt tolerating brace and po analgesics.  Received physical and occupational therapy consults.  showered this morning,  refuses tele to be reapplied after shower.  BG-175 and 151 today, refuses insulin.  Receiving metformin.  Head CT and shoulder xray completed today. /59 (BP Location: Right arm)   Pulse 86   Temp 97.3  F (36.3  C) (Oral)   Resp 16   Ht 1.803 m (5' 11\")   Wt 120.2 kg (265 lb 1.6 oz)   SpO2 96%   BMI 36.97 kg/m          "

## 2023-02-22 NOTE — PLAN OF CARE
Physical Therapy Discharge Summary    Reason for therapy discharge:    All goals and outcomes met, no further needs identified.  No further expectations of functional progress.    Progress towards therapy goal(s). See goals on Care Plan in [facility] electronic health record for goal details.  Goals met    Therapy recommendation(s):    Continued therapy is recommended.  Rationale/Recommendations:  to address baseline dizziness, impaired mobility, deconditioning and post compression fracture functional mobility training in the home for greater safety..      Thank you for your referral.  Concepcion Lloyd, PT, DPT, ATC, LAT    [facility]  O: [phone]  E: [email or web address]

## 2023-02-22 NOTE — PROGRESS NOTES
"PRIMARY DIAGNOSIS: \"GENERIC\" NURSING  OUTPATIENT/OBSERVATION GOALS TO BE MET BEFORE DISCHARGE:  1. ADLs back to baseline: No    2. Activity and level of assistance: Up with standby assistance.    3. Pain status: Improved-controlled with oral pain medications.    4. Return to near baseline physical activity: No     Discharge Planner Nurse   Safe discharge environment identified: Yes  Barriers to discharge: Yes              Entered by: Nathaniel Britton RN 02/22/2023 3:29 AM     Please review provider order for any additional goals.   Nurse to notify provider when observation goals have been met and patient is ready for discharge.  "

## 2023-02-23 VITALS
DIASTOLIC BLOOD PRESSURE: 61 MMHG | BODY MASS INDEX: 37.11 KG/M2 | SYSTOLIC BLOOD PRESSURE: 134 MMHG | OXYGEN SATURATION: 95 % | HEIGHT: 71 IN | RESPIRATION RATE: 18 BRPM | TEMPERATURE: 99 F | WEIGHT: 265.1 LBS | HEART RATE: 83 BPM

## 2023-02-23 LAB
DEPRECATED CALCIDIOL+CALCIFEROL SERPL-MC: 56 UG/L (ref 20–75)
GLUCOSE BLDC GLUCOMTR-MCNC: 145 MG/DL (ref 70–99)
GLUCOSE BLDC GLUCOMTR-MCNC: 151 MG/DL (ref 70–99)
GLUCOSE BLDC GLUCOMTR-MCNC: 154 MG/DL (ref 70–99)

## 2023-02-23 PROCEDURE — 250N000013 HC RX MED GY IP 250 OP 250 PS 637: Performed by: NURSE PRACTITIONER

## 2023-02-23 PROCEDURE — G0378 HOSPITAL OBSERVATION PER HR: HCPCS

## 2023-02-23 PROCEDURE — 99239 HOSP IP/OBS DSCHRG MGMT >30: CPT | Performed by: NURSE PRACTITIONER

## 2023-02-23 PROCEDURE — 82962 GLUCOSE BLOOD TEST: CPT

## 2023-02-23 RX ORDER — METHOCARBAMOL 500 MG/1
500 TABLET, FILM COATED ORAL EVERY 8 HOURS PRN
Qty: 30 TABLET | Refills: 0 | Status: SHIPPED | OUTPATIENT
Start: 2023-02-23 | End: 2023-03-14

## 2023-02-23 RX ORDER — GABAPENTIN 300 MG/1
300 CAPSULE ORAL PRN
Qty: 60 CAPSULE | Refills: 0 | Status: SHIPPED | OUTPATIENT
Start: 2023-02-23 | End: 2023-04-12

## 2023-02-23 RX ORDER — POLYETHYLENE GLYCOL 3350 17 G/17G
17 POWDER, FOR SOLUTION ORAL DAILY PRN
Qty: 30 PACKET | Refills: 0
Start: 2023-02-23 | End: 2024-04-30

## 2023-02-23 RX ORDER — OXYCODONE HYDROCHLORIDE 5 MG/1
5-10 TABLET ORAL EVERY 4 HOURS PRN
Qty: 30 TABLET | Refills: 0 | Status: SHIPPED | OUTPATIENT
Start: 2023-02-23 | End: 2023-02-28

## 2023-02-23 RX ADMIN — PANTOPRAZOLE SODIUM 40 MG: 40 TABLET, DELAYED RELEASE ORAL at 05:48

## 2023-02-23 RX ADMIN — MULTIPLE VITAMINS W/ MINERALS TAB 1 TABLET: TAB at 13:38

## 2023-02-23 RX ADMIN — ATORVASTATIN CALCIUM 10 MG: 10 TABLET, FILM COATED ORAL at 08:51

## 2023-02-23 RX ADMIN — METHOCARBAMOL 500 MG: 500 TABLET ORAL at 13:38

## 2023-02-23 RX ADMIN — OXYCODONE HYDROCHLORIDE 5 MG: 5 TABLET ORAL at 13:38

## 2023-02-23 RX ADMIN — HYDROCHLOROTHIAZIDE 50 MG: 25 TABLET ORAL at 08:53

## 2023-02-23 RX ADMIN — SPIRONOLACTONE 25 MG: 25 TABLET, FILM COATED ORAL at 08:59

## 2023-02-23 RX ADMIN — Medication 25 MCG: at 08:54

## 2023-02-23 RX ADMIN — VALSARTAN 160 MG: 80 TABLET, FILM COATED ORAL at 08:54

## 2023-02-23 RX ADMIN — PREGABALIN 25 MG: 25 CAPSULE ORAL at 13:38

## 2023-02-23 RX ADMIN — TAMSULOSIN HYDROCHLORIDE 0.4 MG: 0.4 CAPSULE ORAL at 08:52

## 2023-02-23 RX ADMIN — METOPROLOL SUCCINATE 100 MG: 100 TABLET, EXTENDED RELEASE ORAL at 08:52

## 2023-02-23 RX ADMIN — LEVOTHYROXINE SODIUM 25 MCG: 25 TABLET ORAL at 05:48

## 2023-02-23 RX ADMIN — METFORMIN HYDROCHLORIDE 500 MG: 500 TABLET ORAL at 08:52

## 2023-02-23 RX ADMIN — PREGABALIN 25 MG: 25 CAPSULE ORAL at 08:54

## 2023-02-23 ASSESSMENT — ACTIVITIES OF DAILY LIVING (ADL)
ADLS_ACUITY_SCORE: 31
ADLS_ACUITY_SCORE: 34
ADLS_ACUITY_SCORE: 31
ADLS_ACUITY_SCORE: 34
ADLS_ACUITY_SCORE: 31
ADLS_ACUITY_SCORE: 34

## 2023-02-23 NOTE — PROGRESS NOTES
Care Management Discharge Note    Discharge Date: 02/23/2023     Discharge Disposition: Transitional Care - [facility] (Admissions phone: [phone] Main Phone: [phone] Fax: [phone])    Discharge Services: Housekeeping/[employer], Meals on Wheels    Discharge DME:      Discharge Transportation: family or friend will provide    Private pay costs discussed: Not applicable    PAS Confirmation Code: [id number]    Patient/family educated on Medicare website which has current facility and service quality ratings: yes    Education Provided on the Discharge Plan:      Persons Notified of Discharge Plans: Patient and friendPrimitivo    Patient/Family in Agreement with the Plan: yes    Handoff Referral Completed: Yes    Additional Information:  Patient accepted at [facility] (Admissions phone: [phone] Main Phone: [phone] Fax: [phone]) and [facility] (Admissions Phone: [phone] Main Phone: [phone] Fax: [phone]).    Discussed options with patient and he chooses [facility] (Admissions phone: [phone] Main Phone: [phone] Fax: [phone]).      Patient arranged a ride with friendPrimitivo, at 1500.    [facility].    NICHOLE Muñoz  [facility] [phone]/ [facility] [phone]  Care Management

## 2023-02-23 NOTE — PROGRESS NOTES
[facility]    Medicine Progress Note - Hospitalist Service    Date of Admission:  2/20/2023    Assessment & Plan   Gucci Logan is a 72 year old male admitted on 2/20/2023.  He sustained traumatic fall with head injury, personal report passing out after fall and on CT found to have acute T5-T6 fracture.  Patient admitted observation status for pain control, PT/SW/Curbside Neurosurgery consultation and clinical monitoring.     # Acute T5-T6 spine fracture:  # History T11-Lumbar spine fusion > 20 years ago:  Conservative pain control: Oxycodone, lidocaine patches, stool softener. Add Robaxin for muscle spasms mid back and C-spine   MRI T-spine Acute fracture involving the ossified anterior longitudinal  ligament at T5-6 extending into the T5-6 disc space, T6 vertebral  body, and T6-7 disc joint.   TLSO brace which was discussed in ED with neurosurgery.   DW PT. He is able to place TLSO himself  Pain has improved after Oxycodone dose increased      # Fall, Subsequent Encounter:  # Head Injury:  # Right Parietal Superficial Laceration/Swelling:  CT Head negative no bleed, mild parietal scalp swelling without fracture  Negative Cervical Spine, Chest, Abdomen negative for acute findings.   Routine wound cares  Fall Precautions     # Personal Report Syncopal Episode:  Patient reports he may have passed out after traumatic fall and head injury.  Initial Head CT negative for acute intracranial findings.  Continues to have dizziness. Repeat head CT due to CHI and thrombocytopenia to R/O slow IC bleed       # Diabetes Mellitus Type 2, Non Insulin Dependent: Hgb A1c 7.0  # Diabetic autonomic neuropathy  AM FSBS 175 mg%   Continue home dose Metformin  CHO 45 g per meal  Had been refusion insulin      # Primary Hypertension goal BP < 140/90  Continue home dose Metoprolol, Valsartan, Spironolactone, Hydrocholrothiazide.     # Hyperkalemia, Mild:-resloved   Lab report reading as hemolyzed  "specimen  K+ 4.1      # Liver Cirrhosis:  # Portal Vein Thrombosis:  # History Chronic Hepatitis C:  # Lower Extremity Edema, Bilateral, Chronic:  Home dose Spironolactone.  ALT marginally elevated   Compression Asher hose, elevate     # Thrombocytopenia, Chronic: In setting of liver disease  Baseline platelets ~ 50-60  Plts 75. No bleeding      # Hyperlipidemia:   Home dose statin     # Hypothyroidism:   Last TSH slightly elevated. Recheck. continue home Levothyroxine.      # Splenomegaly, Chronic: Secondary to cirrhosis     # GERD:  Home dose Omeprazole.     # Class II obesity: Body mass index is 36.97 kg/m . encourage healthy meal choices, physical activity per patient tolerance.     # SHONDA: Encourage CPAP use.     # Arthritis:  # Right Shoulder pain, chronic:   As needed tramadol at home.      # Right shoulder and C-spine pain.   X-ray right shoulder   Reviewed CT of  C-spine done in ED. Pt has multilevel degenerative changes C3-C7 as well as moderate-severe spinal stenosis C3-C7 I suspect fall aggravated his degenerative C-spine disease.   Continue pain management      Head CT done for continued vertigo-negative. Right shoulder showed mild joint arthrosis. No fx or dislocation. I updated the pt regarding radiology results.        Transfer to [facility] 1500 today        Diet: Combination Diet Regular Diet; Low Consistent Carb (45 g CHO per Meal) Diet; Low Saturated Fat Na <2400mg Diet    DVT Prophylaxis: Pneumatic Compression Devices  Rankin Catheter: Not present  Lines: None     Cardiac Monitoring: None  Code Status: Full Code      Clinically Significant Risk Factors Present on Admission                  # Hypertension: home medication list includes antihypertensive(s)     # DMII: A1C = 7.0 % (Ref range: 0.0 - 5.6 %) within past 6 months    # Obesity: Estimated body mass index is 36.97 kg/m  as calculated from the following:    Height as of this encounter: 1.803 m (5' 11\").    Weight as of this " encounter: 120.2 kg (265 lb 1.6 oz).           Disposition Plan     Expected Discharge Date: 02/23/2023,  9:00 AM    Destination: home with help/services  Discharge Comments: possible head CT within next 48 hours, dizzy, head injury. TLSO, probably TCU, patient wants to go home but doesn't have support at home.        The patient's care was discussed with the Attending Physician, Dr. Damon .    DIANE Price [facility]  Hospitalist Service  [facility]  Securely message with [employer] (more info)  Text page via [facility] Paging/Directory   ______________________________________________________________________    Interval History   Reviewed overnight notes. No significant events     Physical Exam   Vital Signs: Temp: 99  F (37.2  C) Temp src: Oral BP: 134/61 Pulse: 83   Resp: 18 SpO2: 95 % O2 Device: None (Room air)    Weight: 265 lbs 1.6 oz    Constitutional: awake, alert, no apparent distress and appears stated age  Hematologic / Lymphatic: No bleeding or bruising   Respiratory: No increased work of breathing, good air exchange, clear to auscultation bilaterally, no crackles or wheezing  Cardiovascular: Normal apical impulse, regular rate and rhythm, normal S1 and S2, no S3 or S4, and no murmur noted  GI: hypoactive bowel sounds, soft, non-distended and no tenderness noted   Skin: no bruising or bleeding, normal skin color, texture, turgor, no redness, warmth, or swelling and no rashes  Musculoskeletal: no lower extremity pitting edema present  there is no redness, warmth, or swelling of the joints  Neurologic: A&)x4, CARLSON, No focal deficits   Neuropsychiatric: General: normal, calm and normal eye contact  Level of consciousness: alert / normal  Affect: normal  Orientation: oriented to self, place, time and situation  Memory and insight: normal, memory for past and recent events intact and thought process normal    Medical Decision Making       40 MINUTES SPENT BY ME on the date of  service doing chart review, history, exam, documentation & further activities per the note.      Data         Imaging results reviewed over the past 24 hrs:   No results found for this or any previous visit (from the past 24 hour(s)).

## 2023-02-23 NOTE — DISCHARGE INSTRUCTIONS
Follow up with Dr Lori Del Rio Neurosurgeon    [address] [place] [address]  [phone]    Call to make an appointment after released from [facility]   
Alert and oriented to person, place and time

## 2023-02-23 NOTE — PROGRESS NOTES
"PRIMARY DIAGNOSIS: \"GENERIC\" NURSING  OUTPATIENT/OBSERVATION GOALS TO BE MET BEFORE DISCHARGE:  1. ADLs back to baseline: Yes    2. Activity and level of assistance: Up with standby assistance.    3. Pain status: Improved with use of alternative comfort measures i.e.: positioning and pain medication    4. Return to near baseline physical activity: Yes     Pt A&Ox4. Refusing lidocaine patch overnight. Ambulated in hallway overnight with SBA, TLSO brace when OOB/walker/gait belt. Robaxin and oxy given overnight for pain.       Discharge Planner Nurse   Safe discharge environment identified: Yes  Barriers to discharge: Yes-needs placement       Entered by: Karie Aragon RN 02/23/2023 4:41 AM     Please review provider order for any additional goals.   Nurse to notify provider when observation goals have been met and patient is ready for discharge.  "

## 2023-02-23 NOTE — PROGRESS NOTES
"PRIMARY DIAGNOSIS: \"GENERIC\" NURSING  OUTPATIENT/OBSERVATION GOALS TO BE MET BEFORE DISCHARGE:  1. ADLs back to baseline: Yes    2. Activity and level of assistance: Up with standby assistance.    3. Pain status: Improved with use of alternative comfort measures i.e.: positioning    4. Return to near baseline physical activity: Yes     Discharge Planner Nurse   Safe discharge environment identified: Yes  Barriers to discharge: yes-needs placement       Entered by: Karie Aragon RN 02/23/2023 4:36 AM     Please review provider order for any additional goals.   Nurse to notify provider when observation goals have been met and patient is ready for discharge.  "

## 2023-02-23 NOTE — PROGRESS NOTES
S-(situation): Patient discharged to [facility] via w/c with friend    B-(background): Fall.  Compression fracture.    A-(assessment): See F/S.   Last bowel movement: 2/21     R-(recommendations):Report called to Paige. Listed belongings gathered and sent with patient.     Discharge Nursing Criteria:     Care Plan and Patient education resolved: Yes    Vaccines  Influenza status verified at discharge:  Yes    MISC  Home medications returned to patient: NA  Medication Bin checked and emptied on discharge Yes  All paperwork sent with patient/Copy of AVS given to patient or family Yes.

## 2023-02-24 ENCOUNTER — PATIENT OUTREACH (OUTPATIENT)
Dept: CARE COORDINATION | Facility: CLINIC | Age: 73
End: 2023-02-24
Payer: COMMERCIAL

## 2023-02-24 NOTE — DISCHARGE SUMMARY
[facility]  Hospitalist Discharge Summary      Date of Admission:  2/20/2023  Date of Discharge:  2/23/2023  3:10 PM  Discharging Provider: DIANE Price CNP  Discharge Service: Hospitalist Service    Discharge Diagnoses   # Acute T5-T6 spine fracture:  # History T11-Lumbar spine fusion > 20 years ago:  MRI T-spine Acute fracture involving the ossified anterior longitudinal  ligament at T5-6 extending into the T5-6 disc space, T6 vertebral  body, and T6-7 disc joint.      # Fall, Subsequent Encounter:  # Head Injury:  # Right Parietal Superficial Laceration/Swelling:  CT Head negative no bleed, mild parietal scalp swelling without fracture  Negative Cervical Spine, Chest, Abdomen negative for acute findings. Repeat head CT neg      # Personal Report Syncopal Episode:  Patient reports he may have passed out after traumatic fall and head injury.  Initial Head CT negative for acute intracranial findings.     # Diabetes Mellitus Type 2, Non Insulin Dependent: Hgb A1c 7.0  # Diabetic autonomic neuropathy  Continue home dose Metformin  CHO 45 g per meal  Had been refusing insulin in the hospital      # Primary Hypertension goal BP < 140/90  Continue home dose Metoprolol, Valsartan, Spironolactone, Hydrocholrothiazide.     # Hyperkalemia, Mild:-resloved   Lab report reading as hemolyzed specimen  K+ 4.1      # Liver Cirrhosis:  # Portal Vein Thrombosis:  # History Chronic Hepatitis C:  # Lower Extremity Edema, Bilateral, Chronic:  Home dose Spironolactone.  ALT marginally elevated   Compression Asher hose, elevate     # Thrombocytopenia, Chronic: In setting of liver disease  Baseline platelets ~ 50-60  Plts 75. No bleeding      # Hyperlipidemia:   Home dose statin     # Hypothyroidism:   Last TSH slightly elevated. Recheck. continue home Levothyroxine.      # Splenomegaly, Chronic: Secondary to cirrhosis     # GERD:  Home dose Omeprazole.     # Class II obesity: Body mass index is  36.97 kg/m . encourage healthy meal choices, physical activity per patient tolerance.     # SHONDA: Encourage CPAP use.     # Arthritis:  # Right Shoulder pain, chronic:   As needed tramadol at home.      # Right shoulder and C-spine pain.   X-ray right shoulder   Reviewed CT of  C-spine done in ED. Pt has multilevel degenerative changes C3-C7 as well as moderate-severe spinal stenosis C3-C7 I suspect fall aggravated his degenerative C-spine disease.   Continue pain management        Follow-ups Needed After Discharge   Follow-up Appointments     Follow Up and recommended labs and tests      Follow up with Nursing home physician.  No follow up labs or test are   needed.    Follow up Dr Lori Del Rio Neurosurgery after dismissal from [facility]  Call to make appointment [address] [facility],   [phone]             Unresulted Labs Ordered in the Past 30 Days of this Admission     No orders found from 1/21/2023 to 2/21/2023.        Discharge Disposition   Discharged to home  [facility]  Condition at discharge: Stable      Hospital Course   The pt is a 71 yo male who presented to the ED at [facility] after a flores he sustained. He slipped on ice landing on his back but also struck the back of his head. Upon arrival, he was reporting back and head pain.     MCNEAL in the ED; Glucose 135, ALT 55. WBC 5.5 H&H 13.7/41.6. Mg 1.7   CT C-A-P showed no evidence of acute trauma within the chest abd or pelvis. He has evidence of portal HTN, no ascites, calcified chronic thrombus superior mesenteric and main portal vein.   CT of the T-spine showed acute transverse fx of T5, T6   CT L spine. No acute lumbar fx  Head CT no acute intercranial trauma. .  CT C-spine showed no post-traumatic subluxations. Has multi-level DDD  ER physician discussed case with neurosurgery who recommended TLSO and MRI of the T-spine     Pt was admitted to the Hospitalist service. He was started on Lidocaine patches and oxycodone. PT/OT  MRI  T-spine ordered. PT evals    MRI T-spine showed fx at T5-T6.. Pt was fitted for TLSO. He was showed how to apply. Pt was seen by PT. He was reporting dizziness. We repeated his head CT to R/O slow bleed. The scan was negative. Pt reporting right shoulder pain. Shoulder x-ray showed no acute findings     Pt initially planned to go home, however, he did change his mind as he was not sure how he would function at home alone.     On the day of dismissal, when I saw him. He did report his pain had improved after increase in pain meds and adding Robaxin. Discussed follow up recommendations. He is to F/U Dr Del Rio at [facility]       Complications: None     Pending results: None      Consultations This Hospital Stay   CARE MANAGEMENT / SOCIAL WORK IP CONSULT  PHYSICAL THERAPY ADULT IP CONSULT  ORTHOSIS BRACE IP CONSULT  OCCUPATIONAL THERAPY ADULT IP CONSULT  PHYSICAL THERAPY ADULT IP CONSULT  OCCUPATIONAL THERAPY ADULT IP CONSULT    Code Status   Full Code    Time Spent on this Encounter   I, DIANE Price CNP, personally saw the patient today and spent greater than 30 minutes discharging this patient.       DIANE Price CNP  [address] MEDICAL SURGICAL  [address] [place] [address]  Phone: [phone]  ______________________________________________________________________    Physical Exam   Vital Signs:                    Weight: 265 lbs 1.6 oz      Constitutional: awake, alert, no apparent distress and appears stated age  Hematologic / Lymphatic: No bleeding or bruising   Respiratory: No increased work of breathing, good air exchange, clear to auscultation bilaterally, no crackles or wheezing  Cardiovascular: Normal apical impulse, regular rate and rhythm, normal S1 and S2, no S3 or S4, and no murmur noted  GI: hypoactive bowel sounds, soft, non-distended and no tenderness noted   Skin: no bruising or bleeding, normal skin color, texture, turgor, no redness,  warmth, or swelling and no rashes  Musculoskeletal: no lower extremity pitting edema present  there is no redness, warmth, or swelling of the joints  Neurologic: A&)x4, CARLSON, No focal deficits   Neuropsychiatric: General: normal, calm and normal eye contact  Level of consciousness: alert / normal  Affect: normal  Orientation: oriented to self, place, time and situation  Memory and insight: normal, memory for past and recent events intact and thought process normal       Primary Care Physician   Richi Jaramillo    Discharge Orders      Primary Care - Care Coordination Referral      General info for SNF    Length of Stay Estimate: Short Term Care: Estimated # of Days <30  Condition at Discharge: Stable  Level of care:skilled   Rehabilitation Potential: Excellent  Admission H&P remains valid and up-to-date: Yes  Recent Chemotherapy: N/A  Use Nursing Home Standing Orders: Yes     Reason for your hospital stay    Fall, T-spine fractures,     Activity - Up with nursing assistance    Pt must have TLSO brace on while out of bed     Follow Up and recommended labs and tests    Follow up with Nursing home physician.  No follow up labs or test are needed.    [facility]  Call to make appointment [address] [facility],   [phone]     Full Code     Physical Therapy Adult Consult    Evaluate and treat as clinically indicated.    Reason:  fall t spine fractures     Occupational Therapy Adult Consult    Evaluate and treat as clinically indicated.    Reason: fall t spine fractures     Walker Order for DME - ONLY FOR DME    I, the undersigned, certify that the above prescribed supplies are medically necessary for this patient and is both reasonable and necessary in reference to accepted standards of medical and necessary in reference to accepted standards of medical practice in the treatment of this patient's condition and is not prescribed as a convenience.       Diet    Follow this diet upon discharge: Orders Placed This Encounter      Combination Diet Regular Diet; Low Consistent Carb (45 g CHO per Meal) Diet; Low Saturated Fat Na <2400mg Diet       Significant Results and Procedures   None     Discharge Medications   Discharge Medication List as of 2/23/2023  2:24 PM      START taking these medications    Details   methocarbamol (ROBAXIN) 500 MG tablet Take 1 tablet (500 mg) by mouth every 8 hours as needed for muscle spasms, Disp-30 tablet, R-0, Local Print      oxyCODONE (ROXICODONE) 5 MG tablet Take 1-2 tablets (5-10 mg) by mouth every 4 hours as needed for severe pain (7-10), Disp-30 tablet, R-0, Local Print      polyethylene glycol (MIRALAX) 17 g packet Take 17 g by mouth daily as needed for constipation, Disp-30 packet, R-0, No Print Out         CONTINUE these medications which have CHANGED    Details   gabapentin (NEURONTIN) 300 MG capsule Take 1 capsule (300 mg) by mouth as needed for neuropathic pain, Disp-60 capsule, R-0, Local Print      Semaglutide-Weight Management 0.5 MG/0.5ML SOAJ Inject 0.25 mg Subcutaneous every 7 days Monday, Disp-0.25 mL, R-0, Local Print         CONTINUE these medications which have NOT CHANGED    Details   atorvastatin (LIPITOR) 10 MG tablet Take 1 tablet (10 mg) by mouth daily, Disp-90 tablet, R-1, E-Prescribe      cholecalciferol 25 MCG (1000 UT) TABS Take 1,000 Units by mouth daily 2 tabs daily, Historical      hydrochlorothiazide (HYDRODIURIL) 50 MG tablet Take 1 tablet (50 mg) by mouth daily, Disp-90 tablet, R-1, E-PrescribePatient to call when refill needed      levothyroxine (SYNTHROID/LEVOTHROID) 25 MCG tablet Take 1 tablet (25 mcg) by mouth daily, Disp-90 tablet, R-1, E-Prescribe      metFORMIN (GLUCOPHAGE) 500 MG tablet Take 1 tablet (500 mg) by mouth 2 times daily (with meals), Disp-180 tablet, R-1, E-Prescribe      metoprolol succinate ER (TOPROL XL) 100 MG 24 hr tablet Take 1 tablet (100 mg) by mouth daily, Disp-90  tablet, R-1, E-PrescribePatient to call when refill needed      multivitamin w/minerals (THERA-VIT-M) tablet Take 1 tablet by mouth daily, Historical      omeprazole (PRILOSEC) 20 MG DR capsule TAKE 1 CAPSULE BY MOUTH ONCE DAILY 30 TO 60 MINUTES BEFORE A MEAL, Disp-90 capsule, R-1, E-Prescribe      spironolactone (ALDACTONE) 25 MG tablet Take 1 tablet (25 mg) by mouth daily, Disp-90 tablet, R-1, E-Prescribe      tamsulosin (FLOMAX) 0.4 MG capsule Take 1 capsule (0.4 mg) by mouth daily, Disp-90 capsule, R-1, E-PrescribePatient to call when refill needed      valsartan (DIOVAN) 160 MG tablet Take 1 tablet (160 mg) by mouth daily, Disp-90 tablet, R-1, E-PrescribeReplaces losartan      blood glucose (NO BRAND SPECIFIED) lancets standard Use to test blood sugar 2 times daily or as directed.Disp-100 lancet, [id number]      blood glucose (NO BRAND SPECIFIED) test strip Use to test blood sugar once time daily or as directed., Disp-100 strip, R-3, E-Prescribe      blood glucose monitoring (NO BRAND SPECIFIED) meter device kit Use to test blood sugar 1 times daily or as directed. Preferred blood glucose meter AND/OR supplies to accompany: Blood Glucose Monitor Brands: per insurance.Disp-1 kit, [id number]         STOP taking these medications       aspirin (ASA) 81 MG chewable tablet Comments:   Reason for Stopping:         HYDROcodone-acetaminophen (NORCO) 5-325 MG tablet Comments:   Reason for Stopping:         pregabalin (LYRICA) 25 MG capsule Comments:   Reason for Stopping:         traMADol (ULTRAM) 50 MG tablet Comments:   Reason for Stopping:             Allergies   Allergies   Allergen Reactions     Influenza Vaccines Other (See Comments)     delirium  fever

## 2023-02-24 NOTE — PROGRESS NOTES
Clinic Care Coordination Contact  Care Coordination Transition Communication       Clinical Data: Patient was hospitalized at [facility] from 2/20/2023 to 2/23/2023 with diagnosis of fall, resulting in T5-T6 fx. Per chart review, patient fell at home with head injury, he passed out after fall.   CT scan of head was negative.  TLSO brace recommended.  Patient was initially planning return home with services, then reported he could not take care of self and cannot do steps    Transition to Facility:              Facility Name: [facility]               Contact name and phone number/fax: [phone]    Per admissions department at [facility]  SW will call that facility early next week to collaborate on patient's care     Plan: RN/SW Care Coordinator will await notification from facility staff informing RN/SW Care Coordinator of patient's discharge plans/needs. RN/SW Care Coordinator will review chart and outreach to facility staff every 4 weeks and as needed.

## 2023-02-27 ENCOUNTER — TELEPHONE (OUTPATIENT)
Dept: NEUROSURGERY | Facility: CLINIC | Age: 73
End: 2023-02-27
Payer: COMMERCIAL

## 2023-02-27 ENCOUNTER — TELEPHONE (OUTPATIENT)
Dept: GERIATRICS | Facility: CLINIC | Age: 73
End: 2023-02-27
Payer: COMMERCIAL

## 2023-02-27 NOTE — TELEPHONE ENCOUNTER
Health Call Center    Phone Message    May a detailed message be left on voicemail: yes     Reason for Call: Appointment Intake    Referring Provider Name: n/a  Diagnosis and/or Symptoms: tlso brace,  back fracture  Dang states that patient was seen by Dr. Del Rio at [facility] and is wondering when patient is needing to follow up per discharge notes.   She can be reached at [phone] for scheduling.    Action Taken: Message routed to:  [facility] ([facility] Neurosurgery    Travel Screening: Not Applicable

## 2023-02-27 NOTE — TELEPHONE ENCOUNTER
[facility] Triage Nurse Telephone Encounter    Provider: DIANE Johnson CNP   Facility: [facility] Facility Type:  TCU    Caller: Ra  Call Back Number: [phone]    Allergies:    Allergies   Allergen Reactions     Influenza Vaccines Other (See Comments)     delirium  fever        Reason for call: Pt is requesting to have his glucose checks discontinued. He is refusing them as of now. He says he doesn't need them. He is on Metformin only; no insulin. Last A1c was 7.0 a month ago.    Verbal Order/Direction given by Provider: Okay to discontinue glucose checks.    Provider giving Order:  DIANE Johnson CNP     Verbal Order given to: VM left with facility nursing line    Eliane Krishnan RN

## 2023-02-28 ENCOUNTER — TRANSITIONAL CARE UNIT VISIT (OUTPATIENT)
Dept: GERIATRICS | Facility: CLINIC | Age: 73
End: 2023-02-28
Payer: COMMERCIAL

## 2023-02-28 ENCOUNTER — TELEPHONE (OUTPATIENT)
Dept: FAMILY MEDICINE | Facility: CLINIC | Age: 73
End: 2023-02-28

## 2023-02-28 VITALS
DIASTOLIC BLOOD PRESSURE: 81 MMHG | BODY MASS INDEX: 37.1 KG/M2 | RESPIRATION RATE: 17 BRPM | OXYGEN SATURATION: 90 % | WEIGHT: 266 LBS | HEART RATE: 68 BPM | SYSTOLIC BLOOD PRESSURE: 149 MMHG | TEMPERATURE: 98.7 F
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
## 2023-02-28 DIAGNOSIS — S22.050D COMPRESSION FRACTURE OF T6 VERTEBRA WITH ROUTINE HEALING: Primary | ICD-10-CM

## 2023-02-28 DIAGNOSIS — S22.059D CLOSED FRACTURE OF FIFTH THORACIC VERTEBRA WITH ROUTINE HEALING, UNSPECIFIED FRACTURE MORPHOLOGY, SUBSEQUENT ENCOUNTER: ICD-10-CM

## 2023-02-28 DIAGNOSIS — E08.49 OTHER DIABETIC NEUROLOGICAL COMPLICATION ASSOCIATED WITH DIABETES MELLITUS DUE TO UNDERLYING CONDITION (H): ICD-10-CM

## 2023-02-28 DIAGNOSIS — I10 HYPERTENSION, UNSPECIFIED TYPE: ICD-10-CM

## 2023-02-28 DIAGNOSIS — S22.050D COMPRESSION FRACTURE OF T5 VERTEBRA WITH ROUTINE HEALING, SUBSEQUENT ENCOUNTER: ICD-10-CM

## 2023-02-28 DIAGNOSIS — R55 SYNCOPE, UNSPECIFIED SYNCOPE TYPE: ICD-10-CM

## 2023-02-28 DIAGNOSIS — E11.9 DIABETES MELLITUS WITHOUT COMPLICATION (H): ICD-10-CM

## 2023-02-28 PROCEDURE — 99309 SBSQ NF CARE MODERATE MDM 30: CPT | Performed by: NURSE PRACTITIONER

## 2023-02-28 RX ORDER — GABAPENTIN 300 MG/1
300 CAPSULE ORAL
COMMUNITY
Start: 2023-02-24 | End: 2023-02-28

## 2023-02-28 RX ORDER — ACETAMINOPHEN 500 MG
1000 TABLET ORAL 3 TIMES DAILY PRN
COMMUNITY
End: 2023-04-12

## 2023-02-28 RX ORDER — OXYCODONE HYDROCHLORIDE 5 MG/1
5 TABLET ORAL EVERY 4 HOURS PRN
Qty: 30 TABLET | Refills: 0
Start: 2023-02-28 | End: 2023-03-14

## 2023-02-28 RX ORDER — LEVOTHYROXINE SODIUM 25 UG/1
25 TABLET ORAL
COMMUNITY
Start: 2023-02-24 | End: 2023-02-28

## 2023-02-28 NOTE — TELEPHONE ENCOUNTER
"Received call from patient. He states that he slipped and fell and ended up in the hospital, is now in [facility] to do PT/OT - is in a back brace. He has multiple complaints about the hospital he was at as well as the facility he is in. He would like Dr. Jaramillo to oversee his care because he feels that the provider in the TCU, Moose Grewal CNP is \"forceful and wouldn't listen\" and \"does not know what is going on, did not know my medical history\". Patient states he is afraid he is going to be \"slipped pills that he should not be on\" and the staff at TCU are \"playing mind-games\" with him. Writer did advise that he should speak with a leader at the facility he is at, a supervisor. He kept asking writer if staff at U are \"beyond being vindictive\" and he is worried that nurse practitioner that is overseeing his care at the facility is going to report him. He states \"they haven't been able to let me look at my medical records, they are very evasive\". Writer did advise that TCU may reach out to us to assist further and speak with a member of our staff to facilitate continuity of care. He declined this. He then states he wants Dr. Jaramillo to know about what is going on- writer sending FYI message. Patient was advised that hospital f/u appointment would be beneficial once he is discharged. No action is needed at this time.    Prema Rodriguez, PETERN RN  [facility] [place]  Primary [facility] Emergency Department    "

## 2023-02-28 NOTE — PROGRESS NOTES
[facility] GERIATRICS    PRIMARY CARE PROVIDER AND CLINIC:  Richi Jaramillo MD, [address] [facility] / [place] [address]  Chief Complaint   Patient presents with     Hospital F/U      [facility] Medical Record Number:  [id number]  Place of Service where encounter took place:  [facility] [[id number]]    Gucci Logan  is a 72 year old  (1950), admitted to the above facility from [facility]..   HPI:   The pt is a 71 yo male who presented to the ED at [facility] after a flores he sustained. He slipped on ice landing on his back but also struck the back of his head. Upon arrival, he was reporting back and head pain.  MCNEAL in the ED; Glucose 135, ALT 55. WBC 5.5 H&H 13.7/41.6. Mg 1.7, CT C-A-P showed no evidence of acute trauma within the chest abd or pelvis. He has evidence of portal HTN, no ascites, calcified chronic thrombus superior mesenteric and main portal vein, CT of the T-spine showed acute transverse fx of T5, T6   CT L spine. No acute lumbar fx, Head CT no acute intercranial trauma, CT C-spine showed no post-traumatic subluxations. Has multi-level DDD, ER physician discussed case with neurosurgery who recommended TLSO and MRI of the T-spine, admitted to the Hospitalist service. He was started on Lidocaine patches and oxycodone, MRI T-spine showed fx at T5-T6.. Pt was fitted for TLSO. He was showed how to apply, was reporting dizziness. We repeated his head CT to R/O slow bleed, the scan was negative. Pt reporting right shoulder pain. Shoulder x-ray showed no acute findings, discharge to [facility], F/U Dr Del Rio at [facility] Neurosurgery       Patient seen today for follow up, transferred from [facility] to [facility] on 2/23, was there <24h and displeased with therapy department [facility], oriented, fairly independent with ADL's, appears healthy, ambulating, wearing TLSO brace, is displeased and frustrated with TCU staff, facility, and  myself with concerns over BG checks, medications not same type of pill, states not knowing what nurses are giving for meds and that I do not have a full grasp of current health issues, may request to exit the facility, seen later in day having lunch with other residents in the TCU and appeared to be enjoying the meal, is having certain level of discomfort in back, skin intact.        CODE STATUS/ADVANCE DIRECTIVES DISCUSSION:  Full Code  See notes  ALLERGIES:   Allergies   Allergen Reactions     Influenza Vaccines Other (See Comments)     delirium  fever        PAST MEDICAL HISTORY:   Past Medical History:   Diagnosis Date     Arthritis      Chronic, continuous use of opioids      Esophageal reflux 03/01/2014     Hearing problem      Hiatal hernia      Hypertension      Jaundice 05/31/2008     Liver disease      Obesity 01/24/2013     Obstructive sleep apnea      Sleep apnea     Advised CPAP machine. Not keen to use it.      Syncope, unspecified syncope type 2/20/2023      PAST SURGICAL HISTORY:   has a past surgical history that includes SPINAL FUSION,ANT,EA ADNL LEVEL; OPEN RX ANKLE DISLOCATN+FIXATN; arthroscopy knee rt/lt; Endoscopic endonasal surgery (1994); rotator cuff repair rt/lt (Right); Hernia surgery (Left, 1994); endoscopy (2-19-15); nasal/sinus polypectomy; Eye surgery; cataract iol, rt/lt (Nov and Dec 2017); Repair ptosis (Left, 10/16/2018); Repair Ptosis Brow (Bilateral, 10/16/2018); Dacryocystorhinostomy (Left, 10/16/2018); Esophagoscopy, gastroscopy, duodenoscopy (EGD), combined (N/A, 4/24/2019); Colonoscopy (N/A, 4/24/2019); and Colonoscopy (N/A, 9/14/2022).  FAMILY HISTORY: family history includes Alzheimer Disease (age of onset: 85) in his mother; Cancer in his father; Cerebrovascular Disease (age of onset: 50) in his father; Dementia in his mother; Hypertension in his father.  SOCIAL HISTORY:   reports that he quit smoking about 24 years ago. His smoking use included cigarettes. He started  smoking about 33 years ago. He has never used smokeless tobacco. He reports current alcohol use. He reports that he does not use drugs.  Patient's living condition: lives alone    Post Discharge Medication Reconciliation Status:   MED REC REQUIRED  Post Medication Reconciliation Status: discharge medications reconciled and changed, per note/orders       Current Outpatient Medications   Medication Sig     acetaminophen (TYLENOL) 500 MG tablet Take 1,000 mg by mouth 3 times daily     atorvastatin (LIPITOR) 10 MG tablet Take 1 tablet (10 mg) by mouth daily     cholecalciferol 25 MCG (1000 UT) TABS Take 1,000 Units by mouth daily 2 tabs daily     gabapentin (NEURONTIN) 300 MG capsule Take 1 capsule (300 mg) by mouth as needed for neuropathic pain     hydrochlorothiazide (HYDRODIURIL) 50 MG tablet Take 1 tablet (50 mg) by mouth daily     levothyroxine (SYNTHROID/LEVOTHROID) 25 MCG tablet Take 1 tablet (25 mcg) by mouth daily     metFORMIN (GLUCOPHAGE) 500 MG tablet Take 1 tablet (500 mg) by mouth 2 times daily (with meals)     methocarbamol (ROBAXIN) 500 MG tablet Take 1 tablet (500 mg) by mouth every 8 hours as needed for muscle spasms     metoprolol succinate ER (TOPROL XL) 100 MG 24 hr tablet Take 1 tablet (100 mg) by mouth daily     multivitamin w/minerals (THERA-VIT-M) tablet Take 1 tablet by mouth daily     omeprazole (PRILOSEC) 20 MG DR capsule TAKE 1 CAPSULE BY MOUTH ONCE DAILY 30 TO 60 MINUTES BEFORE A MEAL (Patient taking differently: 20 mg daily TAKE 1 CAPSULE BY MOUTH ONCE DAILY 30 TO 60 MINUTES BEFORE A MEAL)     oxyCODONE (ROXICODONE) 5 MG tablet Take 1 tablet (5 mg) by mouth every 4 hours as needed for severe pain (7-10)     polyethylene glycol (MIRALAX) 17 g packet Take 17 g by mouth daily as needed for constipation     Semaglutide-Weight Management 0.5 MG/0.5ML SOAJ Inject 0.25 mg Subcutaneous every 7 days Monday     spironolactone (ALDACTONE) 25 MG tablet Take 1 tablet (25 mg) by mouth daily      tamsulosin (FLOMAX) 0.4 MG capsule Take 1 capsule (0.4 mg) by mouth daily     valsartan (DIOVAN) 160 MG tablet Take 1 tablet (160 mg) by mouth daily     No current facility-administered medications for this visit.       ROS:  4 point ROS including Respiratory, CV, GI and , other than that noted in the HPI,  is negative    Vitals:  BP (!) 149/81   Pulse 68   Temp 98.7  F (37.1  C)   Resp 17   Wt 120.7 kg (266 lb)   SpO2 90%   BMI 37.10 kg/m    Exam:  GENERAL APPEARANCE:  Alert, appears healthy  ENT:  normal hearing acuity  RESP:  no respiratory distress  CV:  peripheral edema mild+ in lower legs  ABDOMEN:  no distension  M/S:   Gait and station normal  SKIN:  Inspection of skin and subcutaneous tissue baseline  NEURO:   Examination of sensation by touch normal  PSYCH:  oriented X 3    Lab/Diagnostic data:  Recent labs in [facility] reviewed by me today.     ASSESSMENT/PLAN:    (S22.050D) Compression fracture of T6 vertebra with routine healing  (primary encounter diagnosis)  (S22.050D) Compression fracture of T5 vertebra with routine healing, subsequent encounter  (R55) Syncope, unspecified syncope type  (I10) Hypertension, unspecified type  (W19.XXXD) Fall, subsequent encounter  Comment: reports moderate back pain, ambulatory, able to make needs known  Plan:   -PT/OT eval and treat  -start APAP 1000mg TID scheduled  -continue oxycodone 5mg Q4h PRN  -nurse to print out current med list for patient to review  -continue methocarbamol, gabapentin  -continue hydrochlorothiazide, metoprolol, spironolactone, valsartan; staff to check VS daily  -attempt labs on 3/6 BMP if patient accepts  -follow up  neurosurgery as scheduled    (E11.9) Diabetes mellitus without complication (H)  (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

## 2023-02-28 NOTE — LETTER
2/28/2023        RE: Gucci Logan  [address] [facility] [address]        [facility] GERIATRICS    PRIMARY CARE PROVIDER AND CLINIC:  Richi Jaramillo MD, [address] [facility] / [place] [address]  Chief Complaint   Patient presents with     Hospital F/U      [facility] Medical Record Number:  [id number]  Place of Service where encounter took place:  [facility] ([facility]) [[id number]]    Gucci Logan  is a 72 year old  (1950), admitted to the above facility from [facility]..   HPI:   The pt is a 73 yo male who presented to the ED at [facility] after a flores he sustained. He slipped on ice landing on his back but also struck the back of his head. Upon arrival, he was reporting back and head pain.  MCNEAL in the ED; Glucose 135, ALT 55. WBC 5.5 H&H 13.7/41.6. Mg 1.7, CT C-A-P showed no evidence of acute trauma within the chest abd or pelvis. He has evidence of portal HTN, no ascites, calcified chronic thrombus superior mesenteric and main portal vein, CT of the T-spine showed acute transverse fx of T5, T6   CT L spine. No acute lumbar fx, Head CT no acute intercranial trauma, CT C-spine showed no post-traumatic subluxations. Has multi-level DDD, ER physician discussed case with neurosurgery who recommended TLSO and MRI of the T-spine, admitted to the Hospitalist service. He was started on Lidocaine patches and oxycodone, MRI T-spine showed fx at T5-T6.. Pt was fitted for TLSO. He was showed how to apply, was reporting dizziness. We repeated his head CT to R/O slow bleed, the scan was negative. Pt reporting right shoulder pain. Shoulder x-ray showed no acute findings, discharge to [facility], F/U Dr Del Rio at [facility] Neurosurgery       Patient seen today for follow up, transferred from [facility] to [facility] on 2/23, was there <24h and displeased with therapy department [facility] oriented, fairly independent with ADL's, appears healthy,  ambulating, wearing TLSO brace, is displeased and frustrated with TCU staff, facility, and myself with concerns over BG checks, medications not same type of pill, states not knowing what nurses are giving for meds and that I do not have a full grasp of current health issues, may request to exit the facility, seen later in day having lunch with other residents in the TCU and appeared to be enjoying the meal, is having certain level of discomfort in back, skin intact.        CODE STATUS/ADVANCE DIRECTIVES DISCUSSION:  Full Code  See notes  ALLERGIES:   Allergies   Allergen Reactions     Influenza Vaccines Other (See Comments)     delirium  fever        PAST MEDICAL HISTORY:   Past Medical History:   Diagnosis Date     Arthritis      Chronic, continuous use of opioids      Esophageal reflux 03/01/2014     Hearing problem      Hiatal hernia      Hypertension      Jaundice 05/31/2008     Liver disease      Obesity 01/24/2013     Obstructive sleep apnea      Sleep apnea     Advised CPAP machine. Not keen to use it.      Syncope, unspecified syncope type 2/20/2023      PAST SURGICAL HISTORY:   has a past surgical history that includes SPINAL FUSION,ANT,EA ADNL LEVEL; OPEN RX ANKLE DISLOCATN+FIXATN; arthroscopy knee rt/lt; Endoscopic endonasal surgery (1994); rotator cuff repair rt/lt (Right); Hernia surgery (Left, 1994); endoscopy (2-19-15); nasal/sinus polypectomy; Eye surgery; cataract iol, rt/lt (Nov and Dec 2017); Repair ptosis (Left, 10/16/2018); Repair Ptosis Brow (Bilateral, 10/16/2018); Dacryocystorhinostomy (Left, 10/16/2018); Esophagoscopy, gastroscopy, duodenoscopy (EGD), combined (N/A, 4/24/2019); Colonoscopy (N/A, 4/24/2019); and Colonoscopy (N/A, 9/14/2022).  FAMILY HISTORY: family history includes Alzheimer Disease (age of onset: 85) in his mother; Cancer in his father; Cerebrovascular Disease (age of onset: 50) in his father; Dementia in his mother; Hypertension in his father.  SOCIAL HISTORY:   reports  that he quit smoking about 24 years ago. His smoking use included cigarettes. He started smoking about 33 years ago. He has never used smokeless tobacco. He reports current alcohol use. He reports that he does not use drugs.  Patient's living condition: lives alone    Post Discharge Medication Reconciliation Status:   MED REC REQUIRED  Post Medication Reconciliation Status: discharge medications reconciled and changed, per note/orders       Current Outpatient Medications   Medication Sig     acetaminophen (TYLENOL) 500 MG tablet Take 1,000 mg by mouth 3 times daily     atorvastatin (LIPITOR) 10 MG tablet Take 1 tablet (10 mg) by mouth daily     cholecalciferol 25 MCG (1000 UT) TABS Take 1,000 Units by mouth daily 2 tabs daily     gabapentin (NEURONTIN) 300 MG capsule Take 1 capsule (300 mg) by mouth as needed for neuropathic pain     hydrochlorothiazide (HYDRODIURIL) 50 MG tablet Take 1 tablet (50 mg) by mouth daily     levothyroxine (SYNTHROID/LEVOTHROID) 25 MCG tablet Take 1 tablet (25 mcg) by mouth daily     metFORMIN (GLUCOPHAGE) 500 MG tablet Take 1 tablet (500 mg) by mouth 2 times daily (with meals)     methocarbamol (ROBAXIN) 500 MG tablet Take 1 tablet (500 mg) by mouth every 8 hours as needed for muscle spasms     metoprolol succinate ER (TOPROL XL) 100 MG 24 hr tablet Take 1 tablet (100 mg) by mouth daily     multivitamin w/minerals (THERA-VIT-M) tablet Take 1 tablet by mouth daily     omeprazole (PRILOSEC) 20 MG DR capsule TAKE 1 CAPSULE BY MOUTH ONCE DAILY 30 TO 60 MINUTES BEFORE A MEAL (Patient taking differently: 20 mg daily TAKE 1 CAPSULE BY MOUTH ONCE DAILY 30 TO 60 MINUTES BEFORE A MEAL)     oxyCODONE (ROXICODONE) 5 MG tablet Take 1 tablet (5 mg) by mouth every 4 hours as needed for severe pain (7-10)     polyethylene glycol (MIRALAX) 17 g packet Take 17 g by mouth daily as needed for constipation     Semaglutide-Weight Management 0.5 MG/0.5ML SOAJ Inject 0.25 mg Subcutaneous every 7 days Monday      spironolactone (ALDACTONE) 25 MG tablet Take 1 tablet (25 mg) by mouth daily     tamsulosin (FLOMAX) 0.4 MG capsule Take 1 capsule (0.4 mg) by mouth daily     valsartan (DIOVAN) 160 MG tablet Take 1 tablet (160 mg) by mouth daily     No current facility-administered medications for this visit.       ROS:  4 point ROS including Respiratory, CV, GI and , other than that noted in the HPI,  is negative    Vitals:  BP (!) 149/81   Pulse 68   Temp 98.7  F (37.1  C)   Resp 17   Wt 120.7 kg (266 lb)   SpO2 90%   BMI 37.10 kg/m    Exam:  GENERAL APPEARANCE:  Alert, appears healthy  ENT:  normal hearing acuity  RESP:  no respiratory distress  CV:  peripheral edema mild+ in lower legs  ABDOMEN:  no distension  M/S:   Gait and station normal  SKIN:  Inspection of skin and subcutaneous tissue baseline  NEURO:   Examination of sensation by touch normal  PSYCH:  oriented X 3    Lab/Diagnostic data:  Recent labs in [facility] reviewed by me today.     ASSESSMENT/PLAN:    (S22.050D) Compression fracture of T6 vertebra with routine healing  (primary encounter diagnosis)  (S22.050D) Compression fracture of T5 vertebra with routine healing, subsequent encounter  (R55) Syncope, unspecified syncope type  (I10) Hypertension, unspecified type  (W19.XXXD) Fall, subsequent encounter  Comment: reports moderate back pain, ambulatory, able to make needs known  Plan:   -PT/OT eval and treat  -start APAP 1000mg TID scheduled  -continue oxycodone 5mg Q4h PRN  -nurse to print out current med list for patient to review  -continue methocarbamol, gabapentin  -continue hydrochlorothiazide, metoprolol, spironolactone, valsartan; staff to check VS daily  -attempt labs on 3/6 BMP if patient accepts  -follow up  neurosurgery as scheduled    (E11.9) Diabetes mellitus without complication (H)  (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

## 2023-03-01 ENCOUNTER — PATIENT OUTREACH (OUTPATIENT)
Dept: CARE COORDINATION | Facility: CLINIC | Age: 73
End: 2023-03-01
Payer: COMMERCIAL

## 2023-03-01 NOTE — PROGRESS NOTES
Clinic Care Coordination Contact    Patient has transferred to [facility]. MITUL left message for [facility], to collaborate on patient needs for future discharge.  MITUL left her contact information requesting facility SW call when discharge date is known    MITUL will call in 3-4 weeks for update     Dede Cruz, NICHOLE, MSW   [facility]  Care Coordination  [facility]  [phone]  3/1/2023 2:16 PM

## 2023-03-03 ENCOUNTER — TRANSITIONAL CARE UNIT VISIT (OUTPATIENT)
Dept: GERIATRICS | Facility: CLINIC | Age: 73
End: 2023-03-03
Payer: COMMERCIAL

## 2023-03-03 VITALS
WEIGHT: 266 LBS | TEMPERATURE: 98.8 F | HEART RATE: 64 BPM | SYSTOLIC BLOOD PRESSURE: 122 MMHG | BODY MASS INDEX: 37.1 KG/M2 | DIASTOLIC BLOOD PRESSURE: 74 MMHG | OXYGEN SATURATION: 93 % | RESPIRATION RATE: 19 BRPM

## 2023-03-03 DIAGNOSIS — E87.5 HYPERKALEMIA: ICD-10-CM

## 2023-03-03 DIAGNOSIS — S22.050D COMPRESSION FRACTURE OF T6 VERTEBRA WITH ROUTINE HEALING: ICD-10-CM

## 2023-03-03 DIAGNOSIS — S22.050D COMPRESSION FRACTURE OF T5 VERTEBRA WITH ROUTINE HEALING, SUBSEQUENT ENCOUNTER: Primary | ICD-10-CM

## 2023-03-03 DIAGNOSIS — N18.30 STAGE 3 CHRONIC KIDNEY DISEASE, UNSPECIFIED WHETHER STAGE 3A OR 3B CKD (H): ICD-10-CM

## 2023-03-03 DIAGNOSIS — E08.00 DIABETES MELLITUS DUE TO UNDERLYING CONDITION WITH HYPEROSMOLARITY WITHOUT COMA, UNSPECIFIED WHETHER LONG TERM INSULIN USE (H): ICD-10-CM

## 2023-03-03 PROCEDURE — 99309 SBSQ NF CARE MODERATE MDM 30: CPT | Performed by: NURSE PRACTITIONER

## 2023-03-03 NOTE — PROGRESS NOTES
[facility] GERIATRICS    Chief Complaint   Patient presents with     RECHECK     HPI:  Gucci Logan is a 72 year old  (1950), who is being seen today for an episodic care visit at: [facility]) [[id number]]. Today's concern is:   1. Compression fracture of T5 vertebra with routine healing, subsequent encounter    2. Compression fracture of T6 vertebra with routine healing    3. Hyperkalemia    4. Stage 3 chronic kidney disease, unspecified whether stage 3a or 3b CKD (H)    5. Diabetes mellitus due to underlying condition with hyperosmolarity without coma, unspecified whether long term insulin use (H)      Patient seen for follow up, instructed me to stand at door vs enter room, talks about being harassed by staff, displeased that I had changed APAP to TID scheduled and wondered who gave me permission, declines request to check BMP for K/renal status, of note recent A1C 7.0, ambulatory, wearing TLSO, pain controlled, appears healthy, able to make needs known, states appreciating the therapy team, plans to return home on Wed next week.      Allergies, and PMH/PSH reviewed in [facility] today.  REVIEW OF SYSTEMS:  4 point ROS including Respiratory, CV, GI and , other than that noted in the HPI,  is negative    Objective:   /74   Pulse 64   Temp 98.8  F (37.1  C)   Resp 19   Wt 120.7 kg (266 lb)   SpO2 93%   BMI 37.10 kg/m    GENERAL APPEARANCE:  in no distress, appears healthy  ENT:  normal hearing acuity  RESP:  no respiratory distress  CV:  no apparent edema  ABDOMEN:  I&O adequate  M/S:   Gait and station normal  SKIN:  Inspection of skin and subcutaneous tissue baseline  NEURO:   from distance neuro appears intact  PSYCH:  oriented X 3    Recent labs in [facility] [employer] [facility] reviewed by me today.     Assessment/Plan:  (S22.050D) Compression fracture of T5 vertebra with routine healing, subsequent encounter  (primary encounter diagnosis)  (S22.050D) Compression fracture of T6 vertebra with  routine healing  Comment: working with therapy, pain controlled  Plan:   -therapies to continue  -wear TLSO as indicated  -changing APAP back to PRN  -continue oxycodone and methocarbamol  -follow up Dr.Parr kent as scheduled    (E87.5) Hyperkalemia  (N18.30) Stage 3 chronic kidney disease, unspecified whether stage 3a or 3b CKD (H)  Comment: recent K 5.2, creat 1.16  Plan: declines wanting follow up labs  -follow up with PCP after TCU discharge    (E08.00) Diabetes mellitus due to underlying condition with hyperosmolarity without coma, unspecified whether long term insulin use (H)  Comment: recent A1C 7.0  Plan:   -discontinue BG checks per patient request  -continue semaglutide and metformin  -follow up BG's and A1C with PCP    MED REC REQUIRED  Post Medication Reconciliation Status: medication reconcilation previously completed during another office visit        Electronically signed by: DIANE Kinney CNP

## 2023-03-03 NOTE — LETTER
3/3/2023        RE: Gucci Logan  [address] [facility] [address]        [facility] GERIATRICS    Chief Complaint   Patient presents with     RECHECK     HPI:  Gucci Logan is a 72 year old  (1950), who is being seen today for an episodic care visit at: [facility] AT [facility] ([facility]) [[id number]]. Today's concern is:   1. Compression fracture of T5 vertebra with routine healing, subsequent encounter    2. Compression fracture of T6 vertebra with routine healing    3. Hyperkalemia    4. Stage 3 chronic kidney disease, unspecified whether stage 3a or 3b CKD (H)    5. Diabetes mellitus due to underlying condition with hyperosmolarity without coma, unspecified whether long term insulin use (H)      Patient seen for follow up, instructed me to stand at door vs enter room, talks about being harassed by staff, displeased that I had changed APAP to TID scheduled and wondered who gave me permission, declines request to check BMP for K/renal status, of note recent A1C 7.0, ambulatory, wearing TLSO, pain controlled, appears healthy, able to make needs known, states appreciating the therapy team, plans to return home on Wed next week.      Allergies, and PMH/PSH reviewed in [facility] today.  REVIEW OF SYSTEMS:  4 point ROS including Respiratory, CV, GI and , other than that noted in the HPI,  is negative    Objective:   /74   Pulse 64   Temp 98.8  F (37.1  C)   Resp 19   Wt 120.7 kg (266 lb)   SpO2 93%   BMI 37.10 kg/m    GENERAL APPEARANCE:  in no distress, appears healthy  ENT:  normal hearing acuity  RESP:  no respiratory distress  CV:  no apparent edema  ABDOMEN:  I&O adequate  M/S:   Gait and station normal  SKIN:  Inspection of skin and subcutaneous tissue baseline  NEURO:   from distance neuro appears intact  PSYCH:  oriented X 3    Recent labs in [facility] reviewed by me today.     Assessment/Plan:  (C18.906C) Compression fracture of T5 vertebra with routine healing, subsequent  encounter  (primary encounter diagnosis)  (S25.314D) Compression fracture of T6 vertebra with routine healing  Comment: working with therapy, pain controlled  Plan:   -therapies to continue  -wear TLSO as indicated  -changing APAP back to PRN  -continue oxycodone and methocarbamol  -follow up  neuro as scheduled    (E87.5) Hyperkalemia  (N18.30) Stage 3 chronic kidney disease, unspecified whether stage 3a or 3b CKD (H)  Comment: recent K 5.2, creat 1.16  Plan: declines wanting follow up labs  -follow up with PCP after TCU discharge    (E08.00) Diabetes mellitus due to underlying condition with hyperosmolarity without coma, unspecified whether long term insulin use (H)  Comment: recent A1C 7.0  Plan:   -discontinue BG checks per patient request  -continue semaglutide and metformin  -follow up BG's and A1C with PCP    MED REC REQUIRED  Post Medication Reconciliation Status: medication reconcilation previously completed during another office visit        Electronically signed by: DIANE Kinney CNP           Sincerely,        DIANE Kinney CNP

## 2023-03-07 ENCOUNTER — TRANSITIONAL CARE UNIT VISIT (OUTPATIENT)
Dept: GERIATRICS | Facility: CLINIC | Age: 73
End: 2023-03-07
Payer: COMMERCIAL

## 2023-03-07 VITALS
HEIGHT: 71 IN | RESPIRATION RATE: 17 BRPM | DIASTOLIC BLOOD PRESSURE: 69 MMHG | OXYGEN SATURATION: 92 % | BODY MASS INDEX: 37.24 KG/M2 | TEMPERATURE: 98.6 F | HEART RATE: 61 BPM | SYSTOLIC BLOOD PRESSURE: 168 MMHG | WEIGHT: 266 LBS
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
## 2023-03-07 DIAGNOSIS — I10 HYPERTENSION, UNSPECIFIED TYPE: ICD-10-CM

## 2023-03-07 DIAGNOSIS — S22.050D COMPRESSION FRACTURE OF T6 VERTEBRA WITH ROUTINE HEALING: ICD-10-CM

## 2023-03-07 DIAGNOSIS — S22.050D COMPRESSION FRACTURE OF T5 VERTEBRA WITH ROUTINE HEALING, SUBSEQUENT ENCOUNTER: Primary | ICD-10-CM

## 2023-03-07 DIAGNOSIS — R55 SYNCOPE, UNSPECIFIED SYNCOPE TYPE: ICD-10-CM

## 2023-03-07 DIAGNOSIS — E08.49 OTHER DIABETIC NEUROLOGICAL COMPLICATION ASSOCIATED WITH DIABETES MELLITUS DUE TO UNDERLYING CONDITION (H): ICD-10-CM

## 2023-03-07 DIAGNOSIS — E11.9 DIABETES MELLITUS WITHOUT COMPLICATION (H): ICD-10-CM

## 2023-03-07 PROCEDURE — 99316 NF DSCHRG MGMT 30 MIN+: CPT | Performed by: NURSE PRACTITIONER

## 2023-03-07 NOTE — PROGRESS NOTES
[facility] GERIATRICS DISCHARGE SUMMARY  PATIENT'S NAME: Gucci Logan  YOB: 1950  MEDICAL RECORD NUMBER:  [id number]  Place of Service where encounter took place:  [facility]  AT [facility] ([facility]) [[id number]]    PRIMARY CARE PROVIDER AND CLINIC RESPONSIBLE AFTER TRANSFER:   Richi Jaramillo MD, [address] [facility] [address]    [facility] [place] Provider     Transferring providers: DIANE Johnson CNP; Kendall Dougherty MD  Recent Hospitalization/ED:  [facility] stay 02/20/2023 to 02/23/2023      Date of SNF Admission: 02/23/2023  Date of SNF (anticipated) Discharge: 03/07/2023  Discharged to: previous independent home  Cognitive Scores: NA  Physical Function: Ambulating 100 ft with walker  DME: Walker    CODE STATUS/ADVANCE DIRECTIVES DISCUSSION:  Full Code   ALLERGIES: Influenza vaccines    NURSING FACILITY COURSE   Medication Changes/Rationale:     See notes    Summary of nursing facility stay:      Compression fracture of T5 vertebra with routine healing, subsequent encounter  Compression fracture of T6 vertebra with routine healing  Syncope, unspecified syncope type  Hypertension, unspecified type  Fall, subsequent encounter  Diabetes mellitus without complication (H)  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)     (S22.050D) Compression fracture of T6 vertebra with routine healing  (primary encounter diagnosis)  (S22.050D) Compression fracture of T5 vertebra with routine healing, subsequent encounter  (R55) Syncope, unspecified syncope type  (I10) Hypertension, unspecified type  (W19.XXXD) Fall, subsequent encounter  Comment: reports moderate back pain, ambulatory, able to make needs known  Plan:   -PT/OT eval and treat  -continue APAP 1000mg TID PRN  -continue oxycodone 5mg Q4h PRN  -continue methocarbamol, gabapentin  -continue hydrochlorothiazide, metoprolol, spironolactone,  valsartan; staff to check VS daily  -attempt labs on 3/6 BMP if patient accepts  -follow up  neurosurgery as scheduled     (E11.9) Diabetes mellitus without complication (H)  (E08.49) 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)  Comment: recent A1C 7.0  Plan:   -discontinue BG checks; patient declines  -continue metformin 500mg BID,   -continue semaglutide 0.25mg weekly as allows  -follow up A1C with PCP after TCUdischarge       Discharge Medications:  MED REC REQUIRED  Post Medication Reconciliation Status: medication reconcilation previously completed during another office visit       Current Outpatient Medications   Medication Sig Dispense Refill     acetaminophen (TYLENOL) 500 MG tablet Take 1,000 mg by mouth 3 times daily as needed       atorvastatin (LIPITOR) 10 MG tablet Take 1 tablet (10 mg) by mouth daily 90 tablet 1     cholecalciferol 25 MCG (1000 UT) TABS Take 1,000 Units by mouth daily 2 tabs daily       gabapentin (NEURONTIN) 300 MG capsule Take 1 capsule (300 mg) by mouth as needed for neuropathic pain 60 capsule 0     hydrochlorothiazide (HYDRODIURIL) 50 MG tablet Take 1 tablet (50 mg) by mouth daily 90 tablet 1     levothyroxine (SYNTHROID/LEVOTHROID) 25 MCG tablet Take 1 tablet (25 mcg) by mouth daily 90 tablet 1     metFORMIN (GLUCOPHAGE) 500 MG tablet Take 1 tablet (500 mg) by mouth 2 times daily (with meals) 180 tablet 1     methocarbamol (ROBAXIN) 500 MG tablet Take 1 tablet (500 mg) by mouth every 8 hours as needed for muscle spasms 30 tablet 0     metoprolol succinate ER (TOPROL XL) 100 MG 24 hr tablet Take 1 tablet (100 mg) by mouth daily 90 tablet 1     multivitamin w/minerals (THERA-VIT-M) tablet Take 1 tablet by mouth daily       omeprazole (PRILOSEC) 20 MG DR capsule TAKE 1 CAPSULE BY MOUTH ONCE DAILY 30 TO 60 MINUTES BEFORE A MEAL (Patient taking differently: 20 mg daily TAKE 1 CAPSULE BY MOUTH ONCE DAILY 30 TO 60  "MINUTES BEFORE A MEAL) 90 capsule 1     oxyCODONE (ROXICODONE) 5 MG tablet Take 1 tablet (5 mg) by mouth every 4 hours as needed for severe pain (7-10) 30 tablet 0     polyethylene glycol (MIRALAX) 17 g packet Take 17 g by mouth daily as needed for constipation 30 packet 0     Semaglutide-Weight Management 0.5 MG/0.5ML SOAJ Inject 0.25 mg Subcutaneous every 7 days Monday 0.25 mL 0     spironolactone (ALDACTONE) 25 MG tablet Take 1 tablet (25 mg) by mouth daily 90 tablet 1     tamsulosin (FLOMAX) 0.4 MG capsule Take 1 capsule (0.4 mg) by mouth daily 90 capsule 1     valsartan (DIOVAN) 160 MG tablet Take 1 tablet (160 mg) by mouth daily 90 tablet 1          Controlled medications:   current supply oxycodone to be sent with patient     Past Medical History:   Past Medical History:   Diagnosis Date     Arthritis      Chronic, continuous use of opioids      Esophageal reflux 03/01/2014     Hearing problem      Hiatal hernia      Hypertension      Jaundice 05/31/2008     Liver disease      Obesity 01/24/2013     Obstructive sleep apnea      Sleep apnea     Advised CPAP machine. Not keen to use it.      Syncope, unspecified syncope type 2/20/2023     Physical Exam:   Vitals: BP (!) 168/69   Pulse 61   Temp 98.6  F (37  C)   Resp 17   Ht 1.803 m (5' 11\")   Wt 120.7 kg (266 lb)   SpO2 92%   BMI 37.10 kg/m    BMI: Body mass index is 37.1 kg/m .  GENERAL APPEARANCE:  in no distress, appears healthy  ENT:  Mouth and posterior oropharynx normal, moist mucous membranes  RESP:  lungs clear to auscultation   CV:  no edema  ABDOMEN:  no distension  M/S:   Gait and station normal  SKIN:  Inspection of skin and subcutaneous tissue baseline  NEURO:   Examination of sensation by touch normal  PSYCH:  affect and mood normal     SNF labs: Recent labs in [facility] reviewed by me today.     DISCHARGE PLAN:    Follow up labs: No labs orders/due    Medical Follow Up:      Follow up with primary care provider in 1 weeks    Current [place] " scheduled appointments:   NA    Discharge Services: Home Care:  Occupational Therapy, Physical Therapy, Registered Nurse and Home Health Aide    Discharge Instructions Verbalized to Patient at Discharge:     None    TOTAL DISCHARGE TIME:   Greater than 30 minutes  Electronically signed by:  DIANE Kinney CNP         Documentation of Face-to-Face and Certification for Home Health Services     Patient: Gucci Logan   YOB: 1950  MR Number: [id number]  Today's Date: 3/7/2023    I certify that patient: Gucci Logan is under my care and that I, or a nurse practitioner or physician's assistant working with me, had a face-to-face encounter that meets the physician face-to-face encounter requirements with this patient on: 3/7/2023.    This encounter with the patient was in whole, or in part, for the following medical condition, which is the primary reason for home health care:   1. Compression fracture of T5 vertebra with routine healing, subsequent encounter    2. Compression fracture of T6 vertebra with routine healing    3. Syncope, unspecified syncope type    4. Hypertension, unspecified type    5. Fall, subsequent encounter    6. Diabetes mellitus without complication (H)    7. 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    My clinical findings support the need for the above services because: Nurse is needed: To provide caregiver training to assist with: med education, DM management.., Occupational Therapy Services are needed to assess and treat cognitive ability and address ADL safety due to impairment in transfers, DME., Physical Therapy Services are needed to assess and treat the following functional impairments: gait instability. and HHA  for bathing    Further, I certify that my clinical findings support that this patient is homebound (i.e. absences from home require considerable and taxing effort and are for medical reasons or Gnosticist services or infrequently or of short duration when for other reasons) because: Requires assistance of another person or specialized equipment to access medical services because patient: Is unable to operate assistive equipment on their own...    Based on the above findings. I certify that this patient is confined to the home and needs intermittent skilled nursing care, physical therapy and/or speech therapy.  The patient is under my care, and I have initiated the establishment of the plan of care.  This patient will be followed by a physician who will periodically review the plan of care.  Physician/Provider to provide follow up care: Richi Jaramillo    Responsible Medicare certified PEC Physician: Moose Grewal NP  Electronic Physician Signature  Date: 3/7/2023

## 2023-03-07 NOTE — LETTER
3/7/2023        RE: Gucci Logan  [address] [facility] [address]        [facility] GERIATRICS DISCHARGE SUMMARY  PATIENT'S NAME: Gucci Logan  YOB: 1950  MEDICAL RECORD NUMBER:  [id number]  Place of Service where encounter took place:  [facility]  AT [facility] ([facility]) [[id number]]    PRIMARY CARE PROVIDER AND CLINIC RESPONSIBLE AFTER TRANSFER:   Richi Jaramillo MD, [address] [facility] / [place] [address]    [facility] Provider     Transferring providers: DIANE Johnson CNP; Kendall Dougherty MD  Recent Hospitalization/ED:  [facility] stay 02/20/2023 to 02/23/2023      Date of SNF Admission: 02/23/2023  Date of SNF (anticipated) Discharge: 03/07/2023  Discharged to: previous independent home  Cognitive Scores: NA  Physical Function: Ambulating 100 ft with walker  DME: Walker    CODE STATUS/ADVANCE DIRECTIVES DISCUSSION:  Full Code   ALLERGIES: Influenza vaccines    NURSING FACILITY COURSE   Medication Changes/Rationale:     See notes    Summary of nursing facility stay:      Compression fracture of T5 vertebra with routine healing, subsequent encounter  Compression fracture of T6 vertebra with routine healing  Syncope, unspecified syncope type  Hypertension, unspecified type  Fall, subsequent encounter  Diabetes mellitus without complication (H)  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)     (S22.050D) Compression fracture of T6 vertebra with routine healing  (primary encounter diagnosis)  (S22.050D) Compression fracture of T5 vertebra with routine healing, subsequent encounter  (R55) Syncope, unspecified syncope type  (I10) Hypertension, unspecified type  (W19.XXXD) Fall, subsequent encounter  Comment: reports moderate back pain, ambulatory, able to make needs known  Plan:   -PT/OT eval and treat  -continue APAP 1000mg TID PRN  -continue oxycodone 5mg Q4h  PRN  -continue methocarbamol, gabapentin  -continue hydrochlorothiazide, metoprolol, spironolactone, valsartan; staff to check VS daily  -attempt labs on 3/6 BMP if patient accepts  -follow up  neurosurgery as scheduled     (E11.9) Diabetes mellitus without complication (H)  (E08.49) 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)  Comment: recent A1C 7.0  Plan:   -discontinue BG checks; patient declines  -continue metformin 500mg BID,   -continue semaglutide 0.25mg weekly as allows  -follow up A1C with PCP after TCUdischarge       Discharge Medications:  MED REC REQUIRED  Post Medication Reconciliation Status: medication reconcilation previously completed during another office visit       Current Outpatient Medications   Medication Sig Dispense Refill     acetaminophen (TYLENOL) 500 MG tablet Take 1,000 mg by mouth 3 times daily as needed       atorvastatin (LIPITOR) 10 MG tablet Take 1 tablet (10 mg) by mouth daily 90 tablet 1     cholecalciferol 25 MCG (1000 UT) TABS Take 1,000 Units by mouth daily 2 tabs daily       gabapentin (NEURONTIN) 300 MG capsule Take 1 capsule (300 mg) by mouth as needed for neuropathic pain 60 capsule 0     hydrochlorothiazide (HYDRODIURIL) 50 MG tablet Take 1 tablet (50 mg) by mouth daily 90 tablet 1     levothyroxine (SYNTHROID/LEVOTHROID) 25 MCG tablet Take 1 tablet (25 mcg) by mouth daily 90 tablet 1     metFORMIN (GLUCOPHAGE) 500 MG tablet Take 1 tablet (500 mg) by mouth 2 times daily (with meals) 180 tablet 1     methocarbamol (ROBAXIN) 500 MG tablet Take 1 tablet (500 mg) by mouth every 8 hours as needed for muscle spasms 30 tablet 0     metoprolol succinate ER (TOPROL XL) 100 MG 24 hr tablet Take 1 tablet (100 mg) by mouth daily 90 tablet 1     multivitamin w/minerals (THERA-VIT-M) tablet Take 1 tablet by mouth daily       omeprazole (PRILOSEC) 20 MG DR capsule TAKE 1 CAPSULE BY MOUTH ONCE DAILY 30 TO 60 MINUTES  "BEFORE A MEAL (Patient taking differently: 20 mg daily TAKE 1 CAPSULE BY MOUTH ONCE DAILY 30 TO 60 MINUTES BEFORE A MEAL) 90 capsule 1     oxyCODONE (ROXICODONE) 5 MG tablet Take 1 tablet (5 mg) by mouth every 4 hours as needed for severe pain (7-10) 30 tablet 0     polyethylene glycol (MIRALAX) 17 g packet Take 17 g by mouth daily as needed for constipation 30 packet 0     Semaglutide-Weight Management 0.5 MG/0.5ML SOAJ Inject 0.25 mg Subcutaneous every 7 days Monday 0.25 mL 0     spironolactone (ALDACTONE) 25 MG tablet Take 1 tablet (25 mg) by mouth daily 90 tablet 1     tamsulosin (FLOMAX) 0.4 MG capsule Take 1 capsule (0.4 mg) by mouth daily 90 capsule 1     valsartan (DIOVAN) 160 MG tablet Take 1 tablet (160 mg) by mouth daily 90 tablet 1          Controlled medications:   current supply oxycodone to be sent with patient     Past Medical History:   Past Medical History:   Diagnosis Date     Arthritis      Chronic, continuous use of opioids      Esophageal reflux 03/01/2014     Hearing problem      Hiatal hernia      Hypertension      Jaundice 05/31/2008     Liver disease      Obesity 01/24/2013     Obstructive sleep apnea      Sleep apnea     Advised CPAP machine. Not keen to use it.      Syncope, unspecified syncope type 2/20/2023     Physical Exam:   Vitals: BP (!) 168/69   Pulse 61   Temp 98.6  F (37  C)   Resp 17   Ht 1.803 m (5' 11\")   Wt 120.7 kg (266 lb)   SpO2 92%   BMI 37.10 kg/m    BMI: Body mass index is 37.1 kg/m .  GENERAL APPEARANCE:  in no distress, appears healthy  ENT:  Mouth and posterior oropharynx normal, moist mucous membranes  RESP:  lungs clear to auscultation   CV:  no edema  ABDOMEN:  no distension  M/S:   Gait and station normal  SKIN:  Inspection of skin and subcutaneous tissue baseline  NEURO:   Examination of sensation by touch normal  PSYCH:  affect and mood normal     SNF labs: Recent labs in [facility] reviewed by me today.     DISCHARGE PLAN:    Follow up labs: No labs " orders/due    Medical Follow Up:      Follow up with primary care provider in 1 weeks    [facility] scheduled appointments:   NA    Discharge Services: Home Care:  Occupational Therapy, Physical Therapy, Registered Nurse and Home Health Aide    Discharge Instructions Verbalized to Patient at Discharge:     None    TOTAL DISCHARGE TIME:   Greater than 30 minutes  Electronically signed by:  DIANE Kinney CNP         Documentation of Face-to-Face and Certification for Home Health Services     Patient: Gucci Logan   YOB: 1950  MR Number: [id number]  Today's Date: 3/7/2023    I certify that patient: Gucci Logan is under my care and that I, or a nurse practitioner or physician's assistant working with me, had a face-to-face encounter that meets the physician face-to-face encounter requirements with this patient on: 3/7/2023.    This encounter with the patient was in whole, or in part, for the following medical condition, which is the primary reason for home health care:   1. Compression fracture of T5 vertebra with routine healing, subsequent encounter    2. Compression fracture of T6 vertebra with routine healing    3. Syncope, unspecified syncope type    4. Hypertension, unspecified type    5. Fall, subsequent encounter    6. Diabetes mellitus without complication (H)    7. 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    My clinical findings support the need for the above services because: Nurse is needed: To provide caregiver training to assist with: med education, DM management.., Occupational Therapy Services are needed to assess and treat cognitive ability and address ADL safety due to impairment in transfers, DME., Physical Therapy  Services are needed to assess and treat the following functional impairments: gait instability. and HHA for bathing    Further, I certify that my clinical findings support that this patient is homebound (i.e. absences from home require considerable and taxing effort and are for medical reasons or Mandaeism services or infrequently or of short duration when for other reasons) because: Requires assistance of another person or specialized equipment to access medical services because patient: Is unable to operate assistive equipment on their own...    Based on the above findings. I certify that this patient is confined to the home and needs intermittent skilled nursing care, physical therapy and/or speech therapy.  The patient is under my care, and I have initiated the establishment of the plan of care.  This patient will be followed by a physician who will periodically review the plan of care.  Physician/Provider to provide follow up care: Richi Jaramillo    Responsible Medicare certified PECOS Physician: Moose Grewal NP  Electronic Physician Signature  Date: 3/7/2023                  Sincerely,        DIANE Kinney CNP

## 2023-03-08 ENCOUNTER — TRANSFERRED RECORDS (OUTPATIENT)
Dept: MULTI SPECIALTY CLINIC | Facility: CLINIC | Age: 73
End: 2023-03-08

## 2023-03-08 ENCOUNTER — TELEPHONE (OUTPATIENT)
Dept: FAMILY MEDICINE | Facility: CLINIC | Age: 73
End: 2023-03-08
Payer: COMMERCIAL

## 2023-03-08 LAB — RETINOPATHY: NEGATIVE

## 2023-03-08 NOTE — TELEPHONE ENCOUNTER
General Call    Contacts       Type Contact Phone/Fax    03/08/2023 03:55 PM CST Phone (Incoming) Canelo Logan (Self) [phone] (Confidential)        Reason for Call:  Patient was discharge from TCU today regarding back injury, he needs to know who he should see next for follow up care? He is unsure if he should go to Ortho or spine specialist. Offered a phone visit but 1st openings wasn't until 3/17 and he didn't want to wait that long. Please call him back with what his next step is.    Okay to leave a detailed message?: Yes at Cell number on file:    Telephone Information:   Mobile [phone]     Carissa Gonzalez   [facility]  Central Scheduler

## 2023-03-09 ENCOUNTER — TELEPHONE (OUTPATIENT)
Dept: FAMILY MEDICINE | Facility: CLINIC | Age: 73
End: 2023-03-09
Payer: COMMERCIAL

## 2023-03-09 NOTE — TELEPHONE ENCOUNTER
Please assist patient with scheduling a TCU follow-up appointment so that we can resume his cares and refer him to appropriate specialty if needed.    Sigifredo Hinds RN  [facility]

## 2023-03-09 NOTE — TELEPHONE ENCOUNTER
[facility] calling to see if Dr. Jaramillo will follow patient for home care orders via phone and fax.    Please call her back at [phone], to let her know.  Okay to leave voice mail message.    Toya Cronin,

## 2023-03-10 ENCOUNTER — TELEPHONE (OUTPATIENT)
Dept: FAMILY MEDICINE | Facility: CLINIC | Age: 73
End: 2023-03-10
Payer: COMMERCIAL

## 2023-03-10 ENCOUNTER — MEDICAL CORRESPONDENCE (OUTPATIENT)
Dept: HEALTH INFORMATION MANAGEMENT | Facility: CLINIC | Age: 73
End: 2023-03-10

## 2023-03-10 NOTE — TELEPHONE ENCOUNTER
The Home Care/Assisted Living/Nursing Facility is calling regarding an established patient.  Has the patient seen Home Care in the past or is currently residing in Assisted Living or Nursing Facility? Yes.     Miladis calling from [facility] requesting the following orders that are within the Home Care, Assisted Living or Nursing Home Eval and Treatment standing order and can be signed as standing order signature required by RN.    Home Care Visits Continuation and PT/OT/Speech Therapy    Any additional Orders:  Are there any orders requested, not stated above, that are outside of the standing order and must be routed to a licensed practitioner for approval?    No    Writer has verified [facility] will send fax to have orders signed.    Requesting the following orders:    Skilled nursing  Once a week for 2 weeks for pain management    PT/OT to evaluate and treat    Home Health Aide  Once a week for personal cares    Approved of requested home care orders as requested per RN protocol.    PETER JonesN RN  [facility]

## 2023-03-14 ENCOUNTER — MEDICAL CORRESPONDENCE (OUTPATIENT)
Dept: HEALTH INFORMATION MANAGEMENT | Facility: CLINIC | Age: 73
End: 2023-03-14

## 2023-03-14 ENCOUNTER — VIRTUAL VISIT (OUTPATIENT)
Dept: FAMILY MEDICINE | Facility: CLINIC | Age: 73
End: 2023-03-14
Payer: COMMERCIAL

## 2023-03-14 ENCOUNTER — TELEPHONE (OUTPATIENT)
Dept: NEUROSURGERY | Facility: CLINIC | Age: 73
End: 2023-03-14

## 2023-03-14 DIAGNOSIS — S00.03XA CONTUSION OF SCALP, INITIAL ENCOUNTER: ICD-10-CM

## 2023-03-14 DIAGNOSIS — S22.059A CLOSED FRACTURE OF SIXTH THORACIC VERTEBRA, UNSPECIFIED FRACTURE MORPHOLOGY, INITIAL ENCOUNTER (H): Primary | ICD-10-CM

## 2023-03-14 DIAGNOSIS — M25.519 SHOULDER PAIN, UNSPECIFIED CHRONICITY, UNSPECIFIED LATERALITY: ICD-10-CM

## 2023-03-14 DIAGNOSIS — E11.9 NON-INSULIN DEPENDENT TYPE 2 DIABETES MELLITUS (H): ICD-10-CM

## 2023-03-14 PROCEDURE — 99443 PR PHYSICIAN TELEPHONE EVALUATION 21-30 MIN: CPT | Mod: 95 | Performed by: FAMILY MEDICINE

## 2023-03-14 RX ORDER — METHOCARBAMOL 500 MG/1
500 TABLET, FILM COATED ORAL EVERY 8 HOURS PRN
Qty: 30 TABLET | Refills: 0 | Status: SHIPPED | OUTPATIENT
Start: 2023-03-14 | End: 2023-04-03

## 2023-03-14 RX ORDER — OXYCODONE HYDROCHLORIDE 5 MG/1
5 TABLET ORAL EVERY 4 HOURS PRN
Qty: 30 TABLET | Refills: 0 | Status: SHIPPED | OUTPATIENT
Start: 2023-03-14 | End: 2023-04-05

## 2023-03-14 NOTE — PATIENT INSTRUCTIONS
Schedule appointment with neurosurgeon    Keep back brace on     Try to taper down/ off the pain med and muscle relaxer    See us Dr. Jaramillo in 1-2 months    See enlosed reports from hospital

## 2023-03-14 NOTE — TELEPHONE ENCOUNTER
Health Call Center    Phone Message    May a detailed message be left on voicemail: no     Reason for Call: Other: Patient is requesting a call back regarding a appt with Dr. Del Rio.    Patient is wondering what he will need to be seen for and is confused. Please call pt back to advise at # [phone].     Writer sees previous TE on 2/27/23    Action Taken: Message routed to:  [facility] neurosurgery     Travel Screening: Not Applicable

## 2023-03-14 NOTE — PROGRESS NOTES
Canelo is a 72 year old who is being evaluated via a billable telephone visit.      What phone number would you like to be contacted at? [phone]  How would you like to obtain your AVS? Mail a copy    Distant Location (provider location):  On-site         Subjective   Canelo is a 72 year old, presenting for the following health issues:  RECHECK      HPI     Follow up on TCU for back fracture       Review of Systems    I reviewed hospital note in detail    3 nights in hospital    At a nursing home in [place] for a day    Then to one in [place] for about 10-12 days    Did occup and physical therapy    Hard to bend    To get home care for a while    Got home Wed March 8    This is day 7 at home          Objective           Vitals:  No vitals were obtained today due to virtual visit.    Physical Exam   healthy, alert and no distress  PSYCH: Alert and oriented times 3; coherent speech, normal   rate and volume, able to articulate logical thoughts, able   to abstract reason, no tangential thoughts, no hallucinations   or delusions  His affect is normal  RESP: No cough, no audible wheezing, able to talk in full sentences  Remainder of exam unable to be completed due to telephone visits       ASSESSMENT / PLAN:  (S22.720R) Closed fracture of sixth thoracic vertebra, unspecified fracture morphology, initial encounter (H)  (primary encounter diagnosis)  Comment: discussed in detail with patient.  Went over imaging tests and labs etc.  One more refill on the pain med and muscle relaxer. Try to taper off those soon.   Patient to schedule follow up with neurosurgeon. Keep brace on.   Plan: oxyCODONE (ROXICODONE) 5 MG tablet,         methocarbamol (ROBAXIN) 500 MG tablet             (S00.03XA) Contusion of scalp, initial encounter  Comment: minor ; no intracranial injury   Plan: as above     (M25.519) Shoulder pain, unspecified chronicity, unspecified laterality  Comment: a little worse since fall, xray normal  Plan: work on  shoulder  Range of motion     (E11.9) Non-insulin dependent type 2 diabetes mellitus (H)  Comment: last hemoglobin a1c fine  Plan: no change in meds      See us in 1-2 months, sooner if needed       I reviewed the patient's medications, allergies, medical history, family history, and social history.    Richi Jaramillo MD              Phone call duration: 30 minutes

## 2023-03-15 ENCOUNTER — TELEPHONE (OUTPATIENT)
Dept: GASTROENTEROLOGY | Facility: CLINIC | Age: 73
End: 2023-03-15
Payer: COMMERCIAL

## 2023-03-15 NOTE — TELEPHONE ENCOUNTER
Returned patient's call. Let him know Dr. Renteria recommended, Dr. Lori Del Rio, neurosurgeon.  Patient states that he would like to hold off on having ultrasound due to his recent spinal fracture as it may be too uncomfortable. Let him know that is fine to hold off for a few months until he is feeling better. No further questions or concerns.    Kylee SNELL LPN  Hepatology Clinic     [facility]    Phone Message    May a detailed message be left on voicemail: yes     Reason for Call: Other: Pt called to inform that on Feb 20, pt fractured spinal thoracic 5 and 6 and other peripherial things were involved and is wearing a back brace 24 / 7, he was in ER and hospital. Pt is asking for a recommend from Dr Renteria for a neurosurgeon or a spinal doctor. Pt also asks if his scheduled appt on 4/7 will still be possible as he does not get around easily. Please call pt back at [phone]. Pt states keep in mind he is a litle on the deaf side, a text would work best but he might need to call back.      Action Taken: Other: hepa    Travel Screening: Not Applicable

## 2023-03-16 DIAGNOSIS — S22.009A THORACIC VERTEBRAL FRACTURE (H): Primary | ICD-10-CM

## 2023-03-17 ENCOUNTER — TELEPHONE (OUTPATIENT)
Dept: FAMILY MEDICINE | Facility: CLINIC | Age: 73
End: 2023-03-17
Payer: COMMERCIAL

## 2023-03-17 NOTE — TELEPHONE ENCOUNTER
[facility]    Phone Message    May a detailed message be left on voicemail: yes     Reason for Call: Other: Patient is wondering if the imaging can be done up in [facility]. Please call back to discuss with patient since he lives far.      Action Taken: Other: Neurosurgical     Travel Screening: Not Applicable

## 2023-03-17 NOTE — TELEPHONE ENCOUNTER
[facility] Call Reporting that patient has High Blood Pressure of 157/70. Patient had taken his BP meds one hour prior before checking BP and still very high. Wondering if there are any recommendation from pcp to see if there are any other protol that is needed. Secondly, willing be sending verbal order as well for OT for 1x per Week for the next three weeks. For shower and energy conservation. If any question please call [phone] and leave voice mail if no one  the phone.

## 2023-03-20 NOTE — TELEPHONE ENCOUNTER
[facility] Call [facility]    Phone Message    May a detailed message be left on voicemail: yes     Reason for Call: Other: Pt is calling and wanting to schedule appt. Pt is wanting to get imaging done in [facility]. Please call Pt back to talk about what the Pt needs to do.      Action Taken: Message routed to:  [facility] Neurosurgery    Travel Screening: Not Applicable

## 2023-03-20 NOTE — TELEPHONE ENCOUNTER
Patient is returning call. He states that when he had elevated BP (157/70), he was upset and expressing frustration to Lisandra with 2 of the PT from [facility] because one did not show and the other PT was upsetting to him. Patient verified that he is currently taking spironolactone, hydrochlorothiazide, metoprolol succinate, and valsartan per directions in Epic med list. Pt states that his BP's has been good. Most of the time he does not do the taking, but usually 130/60's.  BP Readings from Last 3 Encounters:   23 (!) 168/69   23 122/74   23 (!) 149/81     Please advise if any further action needed for elevated BP    Patient asked if Lisandra also communicated that he was having some diarrhea recently for 4 days straight. Per patient The diarrhea is now stopped and resolved. He thinks it was caused by some bad or  food that he ate. He also had vomiting. Asked what he should do if this happens again. Discussed to go to clear fluids and bland diet if he can tolerate, push fluids, rest, and electrolyte drink. If diarrhea severe or ongoing, then he should contact the clinic to speak with a triage nurse.    Pt states that he has been trying to see the neurosurgeon that he was supposed to have a follow up with. He has been going around in circles. He is wondering about seeing neurosurgery at [place]. Writer advised contacting neurosurgery team directly with his questions.    Margarette Crowell RN   [facility]

## 2023-03-20 NOTE — TELEPHONE ENCOUNTER
<140/90 is blood pressure goal.     Patient currently on Metoprolol 100 mg daily, and Valsartan 160 mg.     Attempted to reach patient. Unable to reach, voicemail box has not been set up yet.    Called NAVEED Fry from [facility]. Lisandra explains patient not currently experiencing any symptoms, and has been taking medications regularly. Informed that patient should be following-up with PCP within 2 weeks to reevaluate BP and medications. Lisandra verbalized she will be contacting patient and will inform of message.     Systolic BP >= 130 OR Diastolic >= 80, and is taking BP medications   Reason: may need medication adjustment or new medicine; treatment goal usually is under 130/80 for most people; goal may be higher in elderly and based on other factors.    Yes See Within 2 Weeks In Office     The Home Care/Assisted Living/Nursing Facility is calling regarding an established patient.  Has the patient seen Home Care in the past or is currently residing in Assisted Living or Nursing Facility? Yes.     PT/OT/Speech Therapy    Any additional Orders:  Are there any orders requested, not stated above, that are outside of the standing order and must be routed to a licensed practitioner for approval?    No    Writer has verified [facility] will send fax to have orders signed.    Approved of requested orders per RN protocol.    ABI Jones RN  [facility]

## 2023-03-21 ENCOUNTER — CARE COORDINATION (OUTPATIENT)
Dept: NEUROSURGERY | Facility: CLINIC | Age: 73
End: 2023-03-21
Payer: COMMERCIAL

## 2023-03-21 NOTE — PROGRESS NOTES
Writer contacted patient and scheduled patient with Dr. Del Rio with EOS prior.     Mily Linder LPN  Neurosurgery

## 2023-03-21 NOTE — TELEPHONE ENCOUNTER
Pt called.  He said that he needs to be seen by Dr. Del Rio 1 month from 2/20/23.  He said that now it's past due.  Please call Pt back to schedule.  Thanks.

## 2023-03-21 NOTE — TELEPHONE ENCOUNTER
"Called and notified patient of provider's message and patient verbalized understanding    He stated that he has still not yet connected and scheduled with neurosurgery.  Per patient, he is surprised that he never saw the neurosurgeon while in the hospital and a follow-up was never scheduled either.  Advised him that there was a message forwarded to the neurosurgery team again today (see 3/14/23 phone encounter).    Patient just wanted another FYI sent to PCP letting him know that patient is unhappy and may request to change to [place] if he does not hear back from our neurosurgery team, \"maybe he can go knock on Dr. Del Rio's door to see if she is still in business. You can give it to him that way, maybe he will get a chuckle out of it.\"       Sigifredo Hinds RN  [facility]    "

## 2023-03-22 ENCOUNTER — TELEPHONE (OUTPATIENT)
Dept: FAMILY MEDICINE | Facility: CLINIC | Age: 73
End: 2023-03-22
Payer: COMMERCIAL

## 2023-03-22 NOTE — TELEPHONE ENCOUNTER
[facility] and can be reached at [phone]. She is calling to clarify pt's Diabetes Mellitis code. Reviewed codes listed on pt's problem list: Diabetes mellitus type 2, Non-insulin dependent type 2 diabetes mellitus, and Diabetes mellitus without complication (most recent diagnosis) E11.9. She will use most recent code of E11.9. Leeann had no further questions.    Katie Nair RN  [facility]

## 2023-03-23 ENCOUNTER — OFFICE VISIT (OUTPATIENT)
Dept: NEUROSURGERY | Facility: CLINIC | Age: 73
End: 2023-03-23
Payer: COMMERCIAL

## 2023-03-23 ENCOUNTER — ANCILLARY PROCEDURE (OUTPATIENT)
Dept: GENERAL RADIOLOGY | Facility: CLINIC | Age: 73
End: 2023-03-23
Attending: PHYSICIAN ASSISTANT
Payer: COMMERCIAL

## 2023-03-23 VITALS
OXYGEN SATURATION: 96 % | WEIGHT: 251.9 LBS | SYSTOLIC BLOOD PRESSURE: 136 MMHG | BODY MASS INDEX: 35.13 KG/M2 | DIASTOLIC BLOOD PRESSURE: 65 MMHG | HEART RATE: 96 BPM

## 2023-03-23 DIAGNOSIS — S22.009A THORACIC VERTEBRAL FRACTURE (H): ICD-10-CM

## 2023-03-23 DIAGNOSIS — S22.058A OTHER CLOSED FRACTURE OF SIXTH THORACIC VERTEBRA, INITIAL ENCOUNTER (H): Primary | ICD-10-CM

## 2023-03-23 DIAGNOSIS — K74.60 CIRRHOSIS OF LIVER WITHOUT ASCITES, UNSPECIFIED HEPATIC CIRRHOSIS TYPE (H): Primary | ICD-10-CM

## 2023-03-23 PROCEDURE — 72082 X-RAY EXAM ENTIRE SPI 2/3 VW: CPT | Performed by: STUDENT IN AN ORGANIZED HEALTH CARE EDUCATION/TRAINING PROGRAM

## 2023-03-23 PROCEDURE — 99204 OFFICE O/P NEW MOD 45 MIN: CPT | Performed by: PHYSICIAN ASSISTANT

## 2023-03-23 PROCEDURE — 77073 BONE LENGTH STUDIES: CPT | Performed by: STUDENT IN AN ORGANIZED HEALTH CARE EDUCATION/TRAINING PROGRAM

## 2023-03-23 ASSESSMENT — PAIN SCALES - GENERAL: PAINLEVEL: MODERATE PAIN (5)

## 2023-03-23 NOTE — LETTER
3/23/2023       RE: Gucci Logan  [address] [facility] [address]     Dear Colleague,    Thank you for referring your patient, Gucci Logan, to the [facility] [place] [facility]. Please see a copy of my visit note below.        Neurosurgery Clinic Note    Chief Complaint: T6 VB fracture    DATE OF VISIT: 3/23/2023    HPI: Gucci Logan is a pleasant 72 year old male with PMH of chronic right shoulder pain, SHONDA, Hepatitis, splenomegaly, DM2, Vit D def, OA, thoracolumbar fusion, obesity, who presented to ED at [facility] on 2/20/23 after a slip and fall on ice, landing on his back and hitting the back of his head.  He was fit with a TLSO for T5/6 fracture.  Dr. Del Rio staffed the consult.  He ws admitted to TCU after discharge.  Discharged with Robaxin, Oxycodone and gabapentin (stopped ASA, Norco, Lyrica and tramadol).      Currently, he is about 4-5 weeks out from his injury.  He has been compliant with using the brace and even sleeping in it.  He notes his back pain is currently about 6.5/10 intensity.  He drove today, so has not taken any narcotics.  He uses a walker to help with ambulation.  He is currently getting  PT/OT/nursing support, but would like to have a service that can see him on Friday.  He denies bladder/bowel incontinence.  Patient notes right shoulder pain and SOB since his fall. He notes SOB is more exertional and recovers with rest.  He lives by himself and was in the middle of finishing off some work in his home.        Past Medical History:   Diagnosis Date     Arthritis      Chronic, continuous use of opioids      Esophageal reflux 03/01/2014     Hearing problem      Hiatal hernia      Hypertension      Jaundice 05/31/2008     Liver disease      Obesity 01/24/2013     Obstructive sleep apnea      Sleep apnea     Advised CPAP machine. Not keen to use it.      Syncope, unspecified  syncope type 2023       Past Surgical History:   Procedure Laterality Date     ARTHROSCOPY KNEE RT/LT      (L) with partial medial meniscectomy     CATARACT IOL, RT/LT  Nov and Dec 2017     COLONOSCOPY N/A 2019    Procedure: COLONOSCOPY, WITH POLYPECTOMY AND BIOPSY;  Surgeon: Leventhal, Thomas Michael, MD;  [facility]     COLONOSCOPY N/A 2022    Procedure: COLONOSCOPY;  Surgeon: Rafa Renee MD;  Location: [facility]     DACRYOCYSTORHINOSTOMY Left 10/16/2018    Procedure: DACRYOCYSTORHINOSTOMY;  Surgeon: Madhu Krause MD;  Location: [facility]     ENDOSCOPIC ENDONASAL SURGERY       ENDOSCOPY  2-19-15     ESOPHAGOSCOPY, GASTROSCOPY, DUODENOSCOPY (EGD), COMBINED N/A 2019    Procedure: COMBINED ESOPHAGOSCOPY, GASTROSCOPY, DUODENOSCOPY (EGD) - hold aspirin ibuprofen or naproxen for one week prior (per physician order);  Surgeon: Leventhal, Thomas Michael, MD;  Location: [facility]     EYE SURGERY       Hernia surgery Left      NASAL/SINUS POLYPECTOMY       REPAIR PTOSIS Left 10/16/2018    Procedure: LEFT UPPER LID PTOSIS AND BILATERAL  BROW PTOSIS REPAIR WITH LEFT DACRYOCYSTORHINOSTOMY ;  Surgeon: Madhu Krause MD;  Location: [facility]     REPAIR PTOSIS BROW Bilateral 10/16/2018    Procedure: REPAIR PTOSIS BROW;  Surgeon: Madhu Krause MD;  Location: [facility]     ROTATOR CUFF REPAIR RT/LT Right      ZZC OPEN RX ANKLE DISLOCATN+FIXATN      (R)     ZZC SPINAL FUSION,ANT,EA ADNL LEVEL      T12 - L1       Social History     Socioeconomic History     Marital status: Single     Number of children: 0     Years of education: 18   Occupational History     Occupation: Contractor     Employer: [employer]     Comment: Not working now   Tobacco Use     Smoking status: Former     Packs/day: 0.00     Years: 5.00     Pack years: 0.00     Types: Cigarettes     Start date: 1990     Quit date: 1999     Years since quittin.2     Smokeless tobacco: Never   Vaping Use     Vaping Use: Never used  "  Substance and Sexual Activity     Alcohol use: Yes     Comment: rare     Drug use: No     Sexual activity: Not Currently     Partners: Female   Other Topics Concern     Parent/sibling w/ CABG, MI or angioplasty before 65F 55M? No       family history includes Alzheimer Disease (age of onset: 85) in his mother; Cancer in his father; Cerebrovascular Disease (age of onset: 50) in his father; Dementia in his mother; Hypertension in his father.     Physical Exam   There were no vitals taken for this visit.    Constitutional: Appears well-developed and well-nourished. Cooperative. No acute distress.   HENT:   Head: Normocephalic and atraumatic.   Eyes: Conjunctivae are normal.  Neck: Neck supple. No tracheal deviation present.  Cardiovascular: Normal rate and regular rhythm.    Pulmonary/Chest: Effort normal and breath sounds normal.  Abdominal: Exhibits no obvious distension.   Musculoskeletal: Able to move all extremities.  No involuntary motor movements.   Skin: Skin is warm, dry and intact.   Psychiatric: Normal mood and affect. Speech is normal and behavior is normal.    Neurological:  Alert, NAD, and oriented to person, place, and time.   No cranial nerve deficit   Stance/Gait: using walker, wearing TLSO brace appropriately  BUE/BLE - no apparent motor deficits    IMAGING:  Personally reviewed     EOS 3/23/23 - T6 VB fracture visualized, slight cortical disruption and lucency noted.  No significant displacement or change from prior imaging.      \"Impression:  1. Posttreatment changes of posterior fusion of T11-L2. Surgical  hardware appears intact.  2. No coronal or sagittal imbalance.  3. Known T5-T6 fractures are poorly visualized on this exam  4. Moderate to severe bilateral medial knee joint space narrowing.\"          CT thoracic 2/20/23 - \"IMPRESSION:  1. Acute transversely oriented fracture through an anterior T5-T6  bridging syndesmophyte and extending through the T5-T6 disc space and  the superior aspect " "of the T6 vertebral body. This is seen in the  setting of diffuse idiopathic skeletal hyperostosis.  2. Nonspecific vertically oriented lucency along the anterior margin  of the T3 vertebral body, which could represent a prominent endplate  osteophyte. A nondisplaced fracture is difficult to completely  exclude. MRI could be considered for further characterization if it  would change the patient's clinical management.  3. Degenerative changes without evidence for high-grade spinal canal  stenosis in the thoracic region.  4. Partially visualized posterior fusion instrumentation extending  from T11 into the lumbar region.  5. Please see the separate report from CT chest, abdomen and pelvis of  same session for additional details.\"    Lumbar CT 2/20/23 - \"IMPRESSION:  1. No acute lumbar spine fracture.  2. Posterior instrumented fusion from T11 to L2.  3. Multilevel degenerative change, as described. There appears to be  up to moderate to severe spinal canal stenosis at L4-L5. Multilevel  neural foraminal stenosis, moderate to severe on the right at L4-L5  and at least moderate at multiple additional levels, as described.\"    Head CT 2/20/23 - \"IMPRESSION:  1. No CT findings of acute intracranial process.  2. Brain atrophy and presumed chronic small vessel ischemic changes,  as described.  3. Mild right parietal scalp swelling without underlying calvarial  Fracture.\"    Cervical CT 2/20/23 - \"                                                       IMPRESSION:  1. No acute fracture or posttraumatic malalignment of the cervical  spine.  2. Multilevel degenerative changes, as described.  3. Moderate/severe multilevel spinal canal and neural foraminal  stenosis related to degenerative changes.\"    MRI Thoracic w/o 2/21/23 - \"IMPRESSION:    1. Acute fracture involving the ossified anterior longitudinal  ligament at T5-6 extending into the T5-6 disc space, T6 vertebral  body, and T6-7 disc joint.   2. No other fractures are " "identified.  3. Mild edema within the right inferior T3 vertebral body is favored  to be degenerative.\"      Vitamin D:  Vitamin D Deficiency Screening Results:  Lab Results   Component Value Date    VITDT 56 02/21/2023    VITDT 39 04/24/2019    VITDT 22 (L) 01/16/2013     ASSESSMENT & PLAN:  Gucci Logan is a 72 year old male s/p slip/fall on ice on 2/20/23 resulting in T6 VB fracture extending from T5/6 disc space into T6/7 disc space and disruption of the ALL.  He is treating with TLSO for 3 months.      He is doing well with the brace and restrictions.  The fracture appears to be stable on today's XR.      He is to continue using the brace at all times when out of bed or head of the bed is greater than 45 degrees for 3 months.  No bending or twisting activities and avoid lifting over 10 pounds.  He should not use vibrating tools.      Okay to resume ASA if needed.  Discussed that he could use Ibuprofen, Gabapentin, Robaxin, Oxycodone as needed for pain.  (He is unable to use Tylenol as he has hepatitis from performing autopsies).  Would cut down on the Robaxin first if having side effects such as jaundice or breathing difficulties as this is metabolized by the liver.  Minimize use of narcotic pain medication.  He has taken Tramdol in the past, which he could resume if need a step-down from oxycodone.  I did defer him to his PCP for additional pain management if needed, but am happy to help in this regard as well.      Right shoulder was evaluated at ED and XR was without pathology.  Recommend f/u with PCP if this continues to be bothersome.     SOB was discussed.  His vitals today are WNL.  He is able to carry on a conversation and ambulate without significant difficulty.  He has no cyanosis.  He is using spirometer at home.  I reviewed his prior records since ED and there was no mention of this.  There was no pneumothorax or fluid line on hospital imaging, nor was one identified on his imaging today.  " There are multiple non emergent reasons for SOB which are outside the scope of this visit.  He does not exhibit signs of emergent medical attention at this time.  SOB can be related to his back pain from the spinal fracture.  PT can be helpful with guiding activities as it relates to this.  I recommended he follow up with his PCP for further management/workup to rule out other causes.  He should present to ED if SOB worsens.  See separate discussion above regarding reducing medications which can also lead to respiratory issues.      I will plan to see him back in a couple of months with another XR.  If stable at that time, we can talk about weaning the brace and doing PT if needed.      Referral provided for  services.  PT/OT/Nursing care are appropriate for him to get.       I spent 30 minutes in patient care with greater than 50% spent in counseling and/or coordination of care.     I performed independent visualization of radiographic imaging and entered my own interpretation, reviewed and/or ordered tests in radiology      Lashonda Meza PA-C  Department of Neurosurgery  Office: [phone]      Sincerely,    Lori Del Rio MD

## 2023-03-23 NOTE — PROGRESS NOTES
Neurosurgery Clinic Note    Chief Complaint: T6 VB fracture    DATE OF VISIT: 3/23/2023    HPI: Gucci Logan is a pleasant 72 year old male with PMH of chronic right shoulder pain, SHONDA, Hepatitis, splenomegaly, DM2, Vit D def, OA, thoracolumbar fusion, obesity, who presented to ED at [facility] on 2/20/23 after a slip and fall on ice, landing on his back and hitting the back of his head.  He was fit with a TLSO for T5/6 fracture.  Dr. Del Rio staffed the consult.  He ws admitted to TCU after discharge.  Discharged with Robaxin, Oxycodone and gabapentin (stopped ASA, Norco, Lyrica and tramadol).      Currently, he is about 4-5 weeks out from his injury.  He has been compliant with using the brace and even sleeping in it.  He notes his back pain is currently about 6.5/10 intensity.  He drove today, so has not taken any narcotics.  He uses a walker to help with ambulation.  He is currently getting HH PT/OT/nursing support, but would like to have a service that can see him on Friday.  He denies bladder/bowel incontinence.  Patient notes right shoulder pain and SOB since his fall. He notes SOB is more exertional and recovers with rest.  He lives by himself and was in the middle of finishing off some work in his home.        Past Medical History:   Diagnosis Date     Arthritis      Chronic, continuous use of opioids      Esophageal reflux 03/01/2014     Hearing problem      Hiatal hernia      Hypertension      Jaundice 05/31/2008     Liver disease      Obesity 01/24/2013     Obstructive sleep apnea      Sleep apnea     Advised CPAP machine. Not keen to use it.      Syncope, unspecified syncope type 2/20/2023       Past Surgical History:   Procedure Laterality Date     ARTHROSCOPY KNEE RT/LT      (L) with partial medial meniscectomy     CATARACT IOL, RT/LT  Nov and Dec 2017     COLONOSCOPY N/A 4/24/2019    Procedure: COLONOSCOPY, WITH POLYPECTOMY AND BIOPSY;  Surgeon: Leventhal, Thomas Michael, MD;   [facility]     COLONOSCOPY N/A 2022    Procedure: COLONOSCOPY;  Surgeon: Rafa Renee MD;  Location: [facility]     DACRYOCYSTORHINOSTOMY Left 10/16/2018    Procedure: DACRYOCYSTORHINOSTOMY;  Surgeon: Madhu Krause MD;  Location: [facility]     ENDOSCOPIC ENDONASAL SURGERY       ENDOSCOPY  2-19-15     ESOPHAGOSCOPY, GASTROSCOPY, DUODENOSCOPY (EGD), COMBINED N/A 2019    Procedure: COMBINED ESOPHAGOSCOPY, GASTROSCOPY, DUODENOSCOPY (EGD) - hold aspirin ibuprofen or naproxen for one week prior (per physician order);  Surgeon: Leventhal, Thomas Michael, MD;  Location: [facility]     EYE SURGERY       Hernia surgery Left      NASAL/SINUS POLYPECTOMY       REPAIR PTOSIS Left 10/16/2018    Procedure: LEFT UPPER LID PTOSIS AND BILATERAL  BROW PTOSIS REPAIR WITH LEFT DACRYOCYSTORHINOSTOMY ;  Surgeon: Madhu Krause MD;  Location: [facility]     REPAIR PTOSIS BROW Bilateral 10/16/2018    Procedure: REPAIR PTOSIS BROW;  Surgeon: Madhu Krause MD;  Location: [facility]     ROTATOR CUFF REPAIR RT/LT Right      ZZC OPEN RX ANKLE DISLOCATN+FIXATN      (R)     ZZC SPINAL FUSION,ANT,EA ADNL LEVEL      T12 - L1       Social History     Socioeconomic History     Marital status: Single     Number of children: 0     Years of education: 18   Occupational History     Occupation: Contractor     Employer: SELF     Comment: Not working now   Tobacco Use     Smoking status: Former     Packs/day: 0.00     Years: 5.00     Pack years: 0.00     Types: Cigarettes     Start date: 1990     Quit date: 1999     Years since quittin.2     Smokeless tobacco: Never   Vaping Use     Vaping Use: Never used   Substance and Sexual Activity     Alcohol use: Yes     Comment: rare     Drug use: No     Sexual activity: Not Currently     Partners: Female   Other Topics Concern     Parent/sibling w/ CABG, MI or angioplasty before 65F 55M? No       family history includes Alzheimer Disease (age of onset: 85) in his mother; Cancer  "in his father; Cerebrovascular Disease (age of onset: 50) in his father; Dementia in his mother; Hypertension in his father.     Physical Exam   There were no vitals taken for this visit.    Constitutional: Appears well-developed and well-nourished. Cooperative. No acute distress.   HENT:   Head: Normocephalic and atraumatic.   Eyes: Conjunctivae are normal.  Neck: Neck supple. No tracheal deviation present.  Cardiovascular: Normal rate and regular rhythm.    Pulmonary/Chest: Effort normal and breath sounds normal.  Abdominal: Exhibits no obvious distension.   Musculoskeletal: Able to move all extremities.  No involuntary motor movements.   Skin: Skin is warm, dry and intact.   Psychiatric: Normal mood and affect. Speech is normal and behavior is normal.    Neurological:  Alert, NAD, and oriented to person, place, and time.   No cranial nerve deficit   Stance/Gait: using walker, wearing TLSO brace appropriately  BUE/BLE - no apparent motor deficits    IMAGING:  Personally reviewed     EOS 3/23/23 - T6 VB fracture visualized, slight cortical disruption and lucency noted.  No significant displacement or change from prior imaging.      \"Impression:  1. Posttreatment changes of posterior fusion of T11-L2. Surgical  hardware appears intact.  2. No coronal or sagittal imbalance.  3. Known T5-T6 fractures are poorly visualized on this exam  4. Moderate to severe bilateral medial knee joint space narrowing.\"          CT thoracic 2/20/23 - \"IMPRESSION:  1. Acute transversely oriented fracture through an anterior T5-T6  bridging syndesmophyte and extending through the T5-T6 disc space and  the superior aspect of the T6 vertebral body. This is seen in the  setting of diffuse idiopathic skeletal hyperostosis.  2. Nonspecific vertically oriented lucency along the anterior margin  of the T3 vertebral body, which could represent a prominent endplate  osteophyte. A nondisplaced fracture is difficult to completely  exclude. MRI " "could be considered for further characterization if it  would change the patient's clinical management.  3. Degenerative changes without evidence for high-grade spinal canal  stenosis in the thoracic region.  4. Partially visualized posterior fusion instrumentation extending  from T11 into the lumbar region.  5. Please see the separate report from CT chest, abdomen and pelvis of  same session for additional details.\"    Lumbar CT 2/20/23 - \"IMPRESSION:  1. No acute lumbar spine fracture.  2. Posterior instrumented fusion from T11 to L2.  3. Multilevel degenerative change, as described. There appears to be  up to moderate to severe spinal canal stenosis at L4-L5. Multilevel  neural foraminal stenosis, moderate to severe on the right at L4-L5  and at least moderate at multiple additional levels, as described.\"    Head CT 2/20/23 - \"IMPRESSION:  1. No CT findings of acute intracranial process.  2. Brain atrophy and presumed chronic small vessel ischemic changes,  as described.  3. Mild right parietal scalp swelling without underlying calvarial  Fracture.\"    Cervical CT 2/20/23 - \"                                                       IMPRESSION:  1. No acute fracture or posttraumatic malalignment of the cervical  spine.  2. Multilevel degenerative changes, as described.  3. Moderate/severe multilevel spinal canal and neural foraminal  stenosis related to degenerative changes.\"    MRI Thoracic w/o 2/21/23 - \"IMPRESSION:    1. Acute fracture involving the ossified anterior longitudinal  ligament at T5-6 extending into the T5-6 disc space, T6 vertebral  body, and T6-7 disc joint.   2. No other fractures are identified.  3. Mild edema within the right inferior T3 vertebral body is favored  to be degenerative.\"      Vitamin D:  Vitamin D Deficiency Screening Results:  Lab Results   Component Value Date    VITDT 56 02/21/2023    VITDT 39 04/24/2019    VITDT 22 (L) 01/16/2013     ASSESSMENT & PLAN:  Gucci Logan is a " 72 year old male s/p slip/fall on ice on 2/20/23 resulting in T6 VB fracture extending from T5/6 disc space into T6/7 disc space and disruption of the ALL.  He is treating with TLSO for 3 months.      He is doing well with the brace and restrictions.  The fracture appears to be stable on today's XR.      He is to continue using the brace at all times when out of bed or head of the bed is greater than 45 degrees for 3 months.  No bending or twisting activities and avoid lifting over 10 pounds.  He should not use vibrating tools.      Okay to resume ASA if needed.  Discussed that he could use Ibuprofen, Gabapentin, Robaxin, Oxycodone as needed for pain.  (He is unable to use Tylenol as he has hepatitis from performing autopsies).  Would cut down on the Robaxin first if having side effects such as jaundice or breathing difficulties as this is metabolized by the liver.  Minimize use of narcotic pain medication.  He has taken Tramdol in the past, which he could resume if need a step-down from oxycodone.  I did defer him to his PCP for additional pain management if needed, but am happy to help in this regard as well.      Right shoulder was evaluated at ED and XR was without pathology.  Recommend f/u with PCP if this continues to be bothersome.     SOB was discussed.  His vitals today are WNL.  He is able to carry on a conversation and ambulate without significant difficulty.  He has no cyanosis.  He is using spirometer at home.  I reviewed his prior records since ED and there was no mention of this.  There was no pneumothorax or fluid line on hospital imaging, nor was one identified on his imaging today.  There are multiple non emergent reasons for SOB which are outside the scope of this visit.  He does not exhibit signs of emergent medical attention at this time.  SOB can be related to his back pain from the spinal fracture.  PT can be helpful with guiding activities as it relates to this.  I recommended he follow up  with his PCP for further management/workup to rule out other causes.  He should present to ED if SOB worsens.  See separate discussion above regarding reducing medications which can also lead to respiratory issues.      I will plan to see him back in a couple of months with another XR.  If stable at that time, we can talk about weaning the brace and doing PT if needed.      Referral provided for  services.  PT/OT/Nursing care are appropriate for him to get.       I spent 30 minutes in patient care with greater than 50% spent in counseling and/or coordination of care.     I performed independent visualization of radiographic imaging and entered my own interpretation, reviewed and/or ordered tests in radiology      Lashonda Meza PA-C  Department of Neurosurgery  Office: [phone]

## 2023-03-24 ENCOUNTER — PATIENT OUTREACH (OUTPATIENT)
Dept: CARE COORDINATION | Facility: CLINIC | Age: 73
End: 2023-03-24
Payer: COMMERCIAL

## 2023-03-24 NOTE — PROGRESS NOTES
Clinic Care Coordination Contact  [facility]       Clinical Data: Care Coordinator Outreach.  Patient was at TCU, he has recently discharged home  Outreach attempted x 1. No opportunity to leave message on cell phone, rang several times   Plan:  Care Coordinator will try to reach patient again in 1-2 business days.    NICHOLE Brock, MSW   [facility]  Care Coordination  [facility]  [phone]  3/24/2023 10:11 AM

## 2023-03-28 DIAGNOSIS — Z53.9 DIAGNOSIS NOT YET DEFINED: Primary | ICD-10-CM

## 2023-03-28 PROCEDURE — G0180 MD CERTIFICATION HHA PATIENT: HCPCS | Performed by: FAMILY MEDICINE

## 2023-03-28 NOTE — PROGRESS NOTES
Clinic Care Coordination Contact  [facility]/Voicemail       Clinical Data: Care Coordinator Outreach  Outreach attempted x 2.  Did not answer after several rings   Plan: Care Coordinator will try to reach patient again in 1-2 business days.    NICHOLE Brock, MSW   [facility]  Care Coordination  [facility]  [phone]  3/28/2023 1:18 PM

## 2023-03-28 NOTE — PROGRESS NOTES
"Clinic Care Coordination Contact    Call to patient, SW only recently became aware he was discharged from TCU earlier this month.  SW had left earlier message for facility SW requesting to be updated on potential discharge.  SW did not receive call from [facility] SW.      Patient reports he was on other line with [facility] and they want to offer him home services.  Patient raised his voice stating he was previously with [facility], but they could not offer him services due to his preference for Friday and, they only work M-Th.  \"Here you are offering me services when they don't even have any it seems\".  Patient very frustrated that \"these things\" were not organized before he left [facility].  Patient is very frustrated, stating you ([facility]) are offering me resources when I have no idea what is available.  SW informed of her role.  Patient stated \"why don't you tell me what is available to me?\".  SW attempted to do so.  SW informed currently services would be private pay due to insurance.  Patient asked why? SW informed that he must have state assistance for potential additional services. SW attempted to discuss this further.  Patient stated \"that is not true\", I am going to have home services.  SW attempted to explain these are short term services through insurance, he then stated \"I will not discuss private things on the phone, I prefer a letter\".  He then said he is going to send a message to his provider stating he is unhappy with this conversation.  He then hung up.      [facility] noted in demographics patient does not wish for anything mailed.  It states that he will  results, that he is not interested in [facility] and does not wish for anything e-mailed.  [facility] will compile letter and call patient to discuss how information can be given to him given his preferences above.  SW will attempt to complete assessment and care plan as patient allows     SW will update PCP and call patient tomorrow to discuss how to send information " based on above     Dede Cruz, NICHOLE, MSW   [facility]  Care Coordination  [facility]  [phone]  3/28/2023 2:08 PM

## 2023-03-29 ENCOUNTER — TELEPHONE (OUTPATIENT)
Dept: FAMILY MEDICINE | Facility: CLINIC | Age: 73
End: 2023-03-29
Payer: COMMERCIAL

## 2023-03-29 ENCOUNTER — MEDICAL CORRESPONDENCE (OUTPATIENT)
Dept: HEALTH INFORMATION MANAGEMENT | Facility: CLINIC | Age: 73
End: 2023-03-29
Payer: COMMERCIAL

## 2023-03-29 NOTE — TELEPHONE ENCOUNTER
Reason for Call:  Form, our goal is to have forms completed with 72 hours, however, some forms may require a visit or additional information.    Type of letter, form or note:  Home Health Certification    Who is the form from?: Home care    Where did the form come from: form was faxed in    What clinic location was the form placed at?: [facility]    Where the form was placed: Given to physician    What number is listed as a contact on the form?: [phone]       Additional comments:     Call taken on 3/29/2023 at 9:25 AM by ION LEUNG

## 2023-03-29 NOTE — PROGRESS NOTES
Clinic Care Coordination Contact  [facility]/Voicemail       Clinical Data: Care Coordinator Outreach  Outreach attempted x 1.  Rang and  stated there is no VM.  Patient states he has no VM in demographics, he does not wish for MyChart or anything mailed to him. SW is uncertain how to send introduction letter and explanation of SW role   Plan: Care Coordinator will try to reach patient again in 3-5 business days.    Dede Cruz, NICHOLE, MSW   [facility]  Care Coordination  [facility]  [phone]  3/29/2023 2:24 PM

## 2023-03-30 ENCOUNTER — TELEPHONE (OUTPATIENT)
Dept: NEUROSURGERY | Facility: CLINIC | Age: 73
End: 2023-03-30
Payer: COMMERCIAL

## 2023-03-30 NOTE — TELEPHONE ENCOUNTER
Writer returned call to Guicho and provided the information need for patient home care services.     Mily Linder LPN  Neurosurgery

## 2023-03-30 NOTE — TELEPHONE ENCOUNTER
[facility] Call Center    Phone Message    May a detailed message be left on voicemail: yes     Reason for Call: Other: [facility] reached out due to the patient calling and asking for home care services. They would need a referral first. Please call to discuss.      Action Taken: Other: Neurosurgery    Travel Screening: Not Applicable

## 2023-04-04 ENCOUNTER — PATIENT OUTREACH (OUTPATIENT)
Dept: CARE COORDINATION | Facility: CLINIC | Age: 73
End: 2023-04-04
Payer: COMMERCIAL

## 2023-04-04 ENCOUNTER — PATIENT OUTREACH (OUTPATIENT)
Dept: NEUROSURGERY | Facility: CLINIC | Age: 73
End: 2023-04-04
Payer: COMMERCIAL

## 2023-04-04 ENCOUNTER — TELEPHONE (OUTPATIENT)
Dept: NEUROSURGERY | Facility: CLINIC | Age: 73
End: 2023-04-04
Payer: COMMERCIAL

## 2023-04-04 ENCOUNTER — TELEPHONE (OUTPATIENT)
Dept: FAMILY MEDICINE | Facility: CLINIC | Age: 73
End: 2023-04-04
Payer: COMMERCIAL

## 2023-04-04 DIAGNOSIS — S22.059A CLOSED FRACTURE OF SIXTH THORACIC VERTEBRA, UNSPECIFIED FRACTURE MORPHOLOGY, INITIAL ENCOUNTER (H): ICD-10-CM

## 2023-04-04 NOTE — TELEPHONE ENCOUNTER
Health Call Center    Phone Message    May a detailed message be left on voicemail: yes     Reason for Call: Other: Patient is calling stating that he spoke with Aureliano earlier about his symptoms and had an additional question in regards to his dizziness and balance. He states that he had her of the [facility] (phone number [phone]) He would like to know if we have heard of this and if it may be something that could be of benefit to him. Please call patient back to discuss further    Action Taken: Message routed to:  [facility] ([facility]): [facility] Neurosurgery    Travel Screening: Not Applicable

## 2023-04-04 NOTE — PROGRESS NOTES
Clinic Care Coordination Contact  [facility]/Voicemail    Clinical Data: Care Coordinator Outreach  Outreach attempted x 3.  No answer after several rings.  SW called several times due to:    SW noted in demographics patient does not wish for anything mailed.  It states that he will  results, that he is not interested in MyChart and does not wish for anything e-mailed    Plan: Care Coordinator will do no further outreaches at this time.  SW will update PCP.  Patient was not actually enrolled as SW could not complete her call with patient.  Introduction letter not sent due to above    NICHOLE Brock, MSW   [facility]  Care Coordination  [facility]  [phone]  4/4/2023 2:26 PM

## 2023-04-04 NOTE — TELEPHONE ENCOUNTER
Reason for Call:  Appointment Request    Patient requesting this type of appt:  Cold symptoms    Requested provider: Richi Jaramillo    Reason patient unable to be scheduled: Not within requested timeframe    When does patient want to be seen/preferred time: Friday, April 7th    Comments: Patient was told by neurosurgeon to see his primary due to his cold symptoms. Patient asked if there is any way Dr. Jaramillo could work him in this Friday after 10:30 am. Please call back to discuss    Okay to leave a detailed message?: Yes at Cell number on file:    Telephone Information:   Mobile [phone]       Call taken on 4/4/2023 at 2:21 PM by Lorrie Watson

## 2023-04-04 NOTE — PROGRESS NOTES
"Phone call to patient in response to telephone message requestion call back in regards to c/o of occasional SOB.      Patient is a 73 y/o gentleman approximately 6 with wks s/p T5/6  Fracture as the result of a fall.  Continues to wear TLSO when OOB.  Similar c/o SOB from last office visit 3/23/23.  Mostly exertional SOB, generally resolves with rest.  Denies cough or URI symptoms.  Denies cardiac palpitations during episodes (although \"I never really paid attention to that\")       With no assessed red flag respiratory symptoms,  recommended he follow up with his PCP for further management/workup to rule out other causes.  He should present to ED if SOB worsens.  Patient verbalized understanding/agreement with current plan.  "

## 2023-04-05 ENCOUNTER — TELEPHONE (OUTPATIENT)
Dept: FAMILY MEDICINE | Facility: CLINIC | Age: 73
End: 2023-04-05
Payer: COMMERCIAL

## 2023-04-05 RX ORDER — OXYCODONE HYDROCHLORIDE 5 MG/1
5 TABLET ORAL EVERY 4 HOURS PRN
Qty: 30 TABLET | Refills: 0 | Status: SHIPPED | OUTPATIENT
Start: 2023-04-05 | End: 2023-06-19

## 2023-04-05 NOTE — TELEPHONE ENCOUNTER
Health Psychology                  Clinic    Department of Medicine  Maribeth Wilkins, PhD, LP [phone]                          Clinics and Surgery Center  [facility] Jaylen Ordonez, PhD, LP [phone]                  3rd Floor  [place] Mail [address]   Donell Edwards, PhD, ABPP, LP [phone]     [address] [facility], [address],  Enedelia Tim,  PhD, LP [phone]            [place] [address] Cecile Fan, PhD, LP [phone]     Inpatient Health Psychology Consultation    Reviewed chart and called patient to complete telemedicine consult. Introduced self and service. She agreed to participate but preferred to speak at a later time. Will re-attempt later today if possible and another team member may reach out a different day if not able to return today due to time limitations.     Enedelia Tim, PhD, LP  Clinical Health Psychologist         Order/Referral Request    Who is requesting: patient    Orders being requested: referral to [facility] in [place]. Phone # [phone]    Reason service is needed/diagnosis: vertigo from fall     When are orders needed by: as soon as possible     Has this been discussed with Provider: Yes    Does patient have a preference on a Group/Provider/Facility? NA    Does patient have an appointment scheduled?: No    Where to send orders: Fax  [phone]    Okay to leave a detailed message?: Yes at Cell number on file:    Telephone Information:   Mobile [phone]

## 2023-04-05 NOTE — TELEPHONE ENCOUNTER
Phone call to patient per his request in regards to [facility] clinics.  Was wondering if we though this would be of benefit.  Patient may receive some benefit, he will reach out to schedule.  If referral is needed, recommended he contact his Primary Care Provider for said referral.  Patient verbalized understanding/agreement with current plan.

## 2023-04-05 NOTE — TELEPHONE ENCOUNTER
Called patient.  He stated that he mentioned to neurosurgery that he has SOB on exertion and they advised that he be seen by PCP for this.  Patient stated that he started noticing this after getting out of rehab about 1 month ago.  Was due to a fall with compression fracture.  He stated that he did not used to get winded so easily but this could also be because he did not receive therapy right away after getting out of rehab due to staffing.  Denies any cough/cold symptoms.  Stated that HC checked his lungs and they were clear.  Dr. Jaramillo is not in on Friday.  Offered an appointment with another provider for this week but patient declined.  He asked for an appointment with Dr. Jaramillo for next week on Tuesday or Wednesday.  Explained that Dr. Jaramillo will not be in on Tuesday but we can schedule him for Wednesday 4/12/2023.  Patient verbalized understanding. Appointment scheduled for 4/12/2023 at 1:40 PM (check in 1:20 PM)    Patient requesting refill of his oxycodone.  Will be out of this before the appointment.  Requesting that refill be sent to [facility] pharmacy this time as it is closer to his home.     Requesting a referral to [facility] in [place]. Phone # [phone].  He stated that he has had a history of vertigo in the past but it started bothering him again after the fall.  Mainly occurs when lying down.      Sigifredo Hinds RN  [facility]

## 2023-04-05 NOTE — TELEPHONE ENCOUNTER
Writer routed to Neurosurgery Clinic Scheduling   Attn: Aureliano Oliveira RNCC   *Patient returning call     Mily Linder LPN  Neurosurgery

## 2023-04-06 ENCOUNTER — TELEPHONE (OUTPATIENT)
Dept: NEUROSURGERY | Facility: CLINIC | Age: 73
End: 2023-04-06
Payer: COMMERCIAL

## 2023-04-06 NOTE — TELEPHONE ENCOUNTER
Health Call Center    Phone Message    May a detailed message be left on voicemail: yes     Reason for Call: Other: Patient called wondering if he should stick with home health care, phyiscal therapy and occupational therapy- or if he should try the [facility] first since insurance may only cover one.   Patient is leaning towards the [facility] due to the thorough testing that they do.   He would like to discuss this with his care team.    Action Taken: Message routed to:  [facility] ([facility]): Neurosurgery    Travel Screening: Not Applicable

## 2023-04-07 ENCOUNTER — LAB (OUTPATIENT)
Dept: LAB | Facility: CLINIC | Age: 73
End: 2023-04-07
Payer: COMMERCIAL

## 2023-04-07 ENCOUNTER — ANCILLARY PROCEDURE (OUTPATIENT)
Dept: ULTRASOUND IMAGING | Facility: CLINIC | Age: 73
End: 2023-04-07
Attending: INTERNAL MEDICINE
Payer: COMMERCIAL

## 2023-04-07 ENCOUNTER — OFFICE VISIT (OUTPATIENT)
Dept: GASTROENTEROLOGY | Facility: CLINIC | Age: 73
End: 2023-04-07
Attending: INTERNAL MEDICINE
Payer: COMMERCIAL

## 2023-04-07 VITALS
BODY MASS INDEX: 36.58 KG/M2 | HEART RATE: 77 BPM | SYSTOLIC BLOOD PRESSURE: 155 MMHG | OXYGEN SATURATION: 97 % | DIASTOLIC BLOOD PRESSURE: 82 MMHG | WEIGHT: 261.3 LBS | HEIGHT: 71 IN

## 2023-04-07 DIAGNOSIS — K74.60 CIRRHOSIS OF LIVER WITHOUT ASCITES, UNSPECIFIED HEPATIC CIRRHOSIS TYPE (H): Primary | ICD-10-CM

## 2023-04-07 DIAGNOSIS — K74.60 CIRRHOSIS OF LIVER WITHOUT ASCITES, UNSPECIFIED HEPATIC CIRRHOSIS TYPE (H): ICD-10-CM

## 2023-04-07 DIAGNOSIS — E08.42 DIABETES MELLITUS DUE TO UNDERLYING CONDITION WITH DIABETIC POLYNEUROPATHY, WITHOUT LONG-TERM CURRENT USE OF INSULIN (H): ICD-10-CM

## 2023-04-07 LAB
AFP SERPL-MCNC: <1.8 NG/ML
ALBUMIN SERPL BCG-MCNC: 3.9 G/DL (ref 3.5–5.2)
ALP SERPL-CCNC: 126 U/L (ref 40–129)
ALT SERPL W P-5'-P-CCNC: 58 U/L (ref 10–50)
ANION GAP SERPL CALCULATED.3IONS-SCNC: 11 MMOL/L (ref 7–15)
AST SERPL W P-5'-P-CCNC: 50 U/L (ref 10–50)
BILIRUB DIRECT SERPL-MCNC: 0.34 MG/DL (ref 0–0.3)
BILIRUB SERPL-MCNC: 1.2 MG/DL
BUN SERPL-MCNC: 23 MG/DL (ref 8–23)
CALCIUM SERPL-MCNC: 9.4 MG/DL (ref 8.8–10.2)
CHLORIDE SERPL-SCNC: 106 MMOL/L (ref 98–107)
CREAT SERPL-MCNC: 1.32 MG/DL (ref 0.67–1.17)
DEPRECATED HCO3 PLAS-SCNC: 25 MMOL/L (ref 22–29)
ERYTHROCYTE [DISTWIDTH] IN BLOOD BY AUTOMATED COUNT: 13.4 % (ref 10–15)
GFR SERPL CREATININE-BSD FRML MDRD: 58 ML/MIN/1.73M2
GLUCOSE SERPL-MCNC: 121 MG/DL (ref 70–99)
HBA1C MFR BLD: 6.6 %
HCT VFR BLD AUTO: 36.4 % (ref 40–53)
HGB BLD-MCNC: 12 G/DL (ref 13.3–17.7)
INR PPP: 1.16 (ref 0.85–1.15)
MCH RBC QN AUTO: 32.1 PG (ref 26.5–33)
MCHC RBC AUTO-ENTMCNC: 33 G/DL (ref 31.5–36.5)
MCV RBC AUTO: 97 FL (ref 78–100)
PLATELET # BLD AUTO: 50 10E3/UL (ref 150–450)
POTASSIUM SERPL-SCNC: 4.3 MMOL/L (ref 3.4–5.3)
PROT SERPL-MCNC: 7 G/DL (ref 6.4–8.3)
RBC # BLD AUTO: 3.74 10E6/UL (ref 4.4–5.9)
SODIUM SERPL-SCNC: 142 MMOL/L (ref 136–145)
WBC # BLD AUTO: 3.2 10E3/UL (ref 4–11)

## 2023-04-07 PROCEDURE — 36415 COLL VENOUS BLD VENIPUNCTURE: CPT | Performed by: PATHOLOGY

## 2023-04-07 PROCEDURE — 83036 HEMOGLOBIN GLYCOSYLATED A1C: CPT | Mod: 90 | Performed by: PATHOLOGY

## 2023-04-07 PROCEDURE — 85610 PROTHROMBIN TIME: CPT | Performed by: PATHOLOGY

## 2023-04-07 PROCEDURE — 82248 BILIRUBIN DIRECT: CPT | Mod: 90 | Performed by: PATHOLOGY

## 2023-04-07 PROCEDURE — 99000 SPECIMEN HANDLING OFFICE-LAB: CPT | Performed by: PATHOLOGY

## 2023-04-07 PROCEDURE — G0463 HOSPITAL OUTPT CLINIC VISIT: HCPCS | Performed by: INTERNAL MEDICINE

## 2023-04-07 PROCEDURE — 85027 COMPLETE CBC AUTOMATED: CPT | Performed by: PATHOLOGY

## 2023-04-07 PROCEDURE — 99215 OFFICE O/P EST HI 40 MIN: CPT | Performed by: INTERNAL MEDICINE

## 2023-04-07 PROCEDURE — 80053 COMPREHEN METABOLIC PANEL: CPT | Mod: 90 | Performed by: PATHOLOGY

## 2023-04-07 PROCEDURE — 82105 ALPHA-FETOPROTEIN SERUM: CPT | Mod: 90 | Performed by: PATHOLOGY

## 2023-04-07 PROCEDURE — 76700 US EXAM ABDOM COMPLETE: CPT | Mod: GC | Performed by: RADIOLOGY

## 2023-04-07 NOTE — NURSING NOTE
"Chief Complaint   Patient presents with     RECHECK     Follow-up for cirrhosis     BP (!) 155/82 (BP Location: Right arm, Patient Position: Sitting, Cuff Size: Adult Large)   Pulse 77   Ht 1.803 m (5' 10.98\")   Wt 118.5 kg (261 lb 4.8 oz)   SpO2 97%   BMI 36.46 kg/m      Marissa Nava on 4/7/2023 at 9:22 AM    "

## 2023-04-07 NOTE — LETTER
4/7/2023         RE: Gucci Logan  [address] [facility] [address]        Dear Colleague,    Thank you for referring your patient, Gucci Logan, to the [facility] [place]. Please see a copy of my visit note below.    HISTORY OF PRESENT ILLNESS:  I had the pleasure of seeing Canelo Logan for followup in the Liver Clinic at the [facility] on 04/07/2023.  Mr. 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    The major new event since he was last seen is he had a pretty significant fall and was hospitalized in [place].  There was a question of whether he might have had some C-spine fracture, and he did have a head laceration as well.  He did spend some time in a rehab facility.  He is currently in a back brace and using a walker.    He otherwise denies any abdominal pain.  He does complain of some itching, which he attributes to dry skin.  He does have some intermittent fatigue.  He denies any increased abdominal girth or lower extremity edema.  He has not had any gastrointestinal bleeding or any overt signs of hepatic encephalopathy.    He denies any fevers, chills or cough.  He does note some intermittent shortness of breath.  He does complain of some nausea without vomiting and no diarrhea or constipation.  His appetite has been good.  He has been trying to lose some weight.  He does report that his diabetes has been under reasonable control.    Current Outpatient Medications   Medication     atorvastatin (LIPITOR) 10 MG tablet     cholecalciferol 25 MCG (1000 UT) TABS     gabapentin (NEURONTIN) 300 MG capsule     hydrochlorothiazide (HYDRODIURIL) 50 MG tablet     levothyroxine (SYNTHROID/LEVOTHROID) 25 MCG tablet     metFORMIN (GLUCOPHAGE) 500 MG tablet     methocarbamol (ROBAXIN) 500 MG tablet     metoprolol succinate ER (TOPROL XL) 100 MG 24 hr tablet     multivitamin w/minerals  "(THERA-VIT-M) tablet     omeprazole (PRILOSEC) 20 MG DR capsule     oxyCODONE (ROXICODONE) 5 MG tablet     polyethylene glycol (MIRALAX) 17 g packet     Semaglutide-Weight Management 0.5 MG/0.5ML SOAJ     spironolactone (ALDACTONE) 25 MG tablet     tamsulosin (FLOMAX) 0.4 MG capsule     valsartan (DIOVAN) 160 MG tablet     acetaminophen (TYLENOL) 500 MG tablet     No current facility-administered medications for this visit.     BP (!) 155/82 (BP Location: Right arm, Patient Position: Sitting, Cuff Size: Adult Large)   Pulse 77   Ht 1.803 m (5' 10.98\")   Wt 118.5 kg (261 lb 4.8 oz)   SpO2 97%   BMI 36.46 kg/m      PHYSICAL EXAMINATION:    GENERAL:  He looks largely unchanged.  HEENT:  No scleral icterus or temporal muscle wasting.  CHEST:  Clear.  ABDOMEN:  No increase in girth.  No masses or tenderness to palpation is present.  His liver is 10 cm in span without left lobe enlargement.  No spleen tip is palpable.  EXTREMITIES:  No edema.  SKIN:  No stigmata of chronic liver disease.  NEUROLOGIC:  No asterixis.    Recent Results (from the past 168 hour(s))   CBC with platelets    Collection Time: 04/07/23  9:02 AM   Result Value Ref Range    WBC Count 3.2 (L) 4.0 - 11.0 10e3/uL    RBC Count 3.74 (L) 4.40 - 5.90 10e6/uL    Hemoglobin 12.0 (L) 13.3 - 17.7 g/dL    Hematocrit 36.4 (L) 40.0 - 53.0 %    MCV 97 78 - 100 fL    MCH 32.1 26.5 - 33.0 pg    MCHC 33.0 31.5 - 36.5 g/dL    RDW 13.4 10.0 - 15.0 %    Platelet Count 50 (L) 150 - 450 10e3/uL   Basic metabolic panel    Collection Time: 04/07/23  9:02 AM   Result Value Ref Range    Sodium 142 136 - 145 mmol/L    Potassium 4.3 3.4 - 5.3 mmol/L    Chloride 106 98 - 107 mmol/L    Carbon Dioxide (CO2) 25 22 - 29 mmol/L    Anion Gap 11 7 - 15 mmol/L    Urea Nitrogen 23.0 8.0 - 23.0 mg/dL    Creatinine 1.32 (H) 0.67 - 1.17 mg/dL    Calcium 9.4 8.8 - 10.2 mg/dL    Glucose 121 (H) 70 - 99 mg/dL    GFR Estimate 58 (L) >60 mL/min/1.73m2   Hepatic function panel    Collection " Time: 04/07/23  9:02 AM   Result Value Ref Range    Protein Total 7.0 6.4 - 8.3 g/dL    Albumin 3.9 3.5 - 5.2 g/dL    Bilirubin Total 1.2 <=1.2 mg/dL    Alkaline Phosphatase 126 40 - 129 U/L    AST 50 10 - 50 U/L    ALT 58 (H) 10 - 50 U/L    Bilirubin Direct 0.34 (H) 0.00 - 0.30 mg/dL   INR    Collection Time: 04/07/23  9:02 AM   Result Value Ref Range    INR 1.16 (H) 0.85 - 1.15   AFP tumor marker    Collection Time: 04/07/23  9:02 AM   Result Value Ref Range    AFP tumor marker <1.8 <=8.3 ng/mL   Hemoglobin A1c    Collection Time: 04/07/23  9:02 AM   Result Value Ref Range    Hemoglobin A1C 6.6 (H) <5.7 %      EXAMINATION: US ABDOMEN COMPLETE,  4/7/2023 8:54 AM      COMPARISON: CT chest abdomen pelvis 2/20/2023, abdominal ultrasound 7/7/2022, liver MRI 2/7/2021     History: Cirrhosis of liver without ascites, unspecified hepatic cirrhosis type (H).      TECHNIQUE: The abdomen was scanned in standard fashion with specialized ultrasound transducer(s) using both gray-scale and limited  color Doppler techniques.     FINDINGS:  Pancreas: Visualized portions of the head and body of the pancreas are  unremarkable.      Liver: The liver measures 17.6 cm and demonstrates cirrhotic morphology. Grossly similar approximate 7 mm echogenic focus in the  left lobe, not as well-seen as on prior imaging. No new focal lesion  demonstrated. The main portal vein is patent with antegrade flow.    US visualization score: B - Moderate limitations     Gallbladder: Cholelithiasis without sonographic findings to suggest acute cholecystitis.     Bile Ducts: Both the intra- and extrahepatic biliary system are of normal caliber.  The common bile duct measures 5 mm in diameter.     Right Kidney: Measures 14.3 cm in length with normal parenchymal  echogenicity and cortical thickness. No hydronephrosis.  Left Kidney: Measures 14.1 cm in length with normal parenchymal  echogenicity and cortical thickness. No hydronephrosis.  Redemonstrated  benign-appearing renal cysts in the lower pole measuring up to 5.0 cm  maximally.     Spleen: The spleen is similarly enlarged,  measuring 18.6 cm in length.     Aorta and IVC: The visualized portions of the aorta and IVC are  unremarkable.      Fluid: None in the visualized abdomen.                                                                    IMPRESSION:   1. Cirrhosis with grossly stable subcentimeter echogenic focus in the  left lobe, better seen on prior imaging. No new focal lesion.    a. LI-RADS US Category: US-1 Negative: No US evidence of HCC    b. Recommend continued surveillance US.  2. Cholelithiasis without evidence for acute cholecystitis.  3. Sequela of portal hypertension including splenomegaly.    IMPRESSION:  Mr. Logan has well-compensated cirrhosis caused by primarily nonalcoholic fatty liver disease.  His disease again is well compensated based on his ultrasound and blood work.  I will not be making any change to his medical regimen.  He is due for an upper GI endoscopy to screen for esophageal varices, which I have gone ahead and ordered.  Otherwise, I will see him back in the clinic in 6 months for repeat imaging and blood work.    I did spend a total of 40 minutes (on the date of the encounter), including 30 minutes of face-to-face clinic time including counseling. The rest of the time was spent in documentation and review of records.     Thank you very much for allowing me to participate in the care of this patient.  If you have any questions regarding recommendations, please do not hesitate to contact me.         Mata Renteria MD      Professor of Medicine  [employer]      Executive Medical Director, Solid Organ Transplant Program  [facility]      Again, thank you for allowing me to participate in the care of your patient.        Sincerely,        Mata Renteria MD

## 2023-04-07 NOTE — PROGRESS NOTES
HISTORY OF PRESENT ILLNESS:  I had the pleasure of seeing Canelo Logan for followup in the Liver Clinic at the [facility] on 04/07/2023.  Mr. 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    The major new event since he was last seen is he had a pretty significant fall and was hospitalized in [place].  There was a question of whether he might have had some C-spine fracture, and he did have a head laceration as well.  He did spend some time in a rehab facility.  He is currently in a back brace and using a walker.    He otherwise denies any abdominal pain.  He does complain of some itching, which he attributes to dry skin.  He does have some intermittent fatigue.  He denies any increased abdominal girth or lower extremity edema.  He has not had any gastrointestinal bleeding or any overt signs of hepatic encephalopathy.    He denies any fevers, chills or cough.  He does note some intermittent shortness of breath.  He does complain of some nausea without vomiting and no diarrhea or constipation.  His appetite has been good.  He has been trying to lose some weight.  He does report that his diabetes has been under reasonable control.    Current Outpatient Medications   Medication     atorvastatin (LIPITOR) 10 MG tablet     cholecalciferol 25 MCG (1000 UT) TABS     gabapentin (NEURONTIN) 300 MG capsule     hydrochlorothiazide (HYDRODIURIL) 50 MG tablet     levothyroxine (SYNTHROID/LEVOTHROID) 25 MCG tablet     metFORMIN (GLUCOPHAGE) 500 MG tablet     methocarbamol (ROBAXIN) 500 MG tablet     metoprolol succinate ER (TOPROL XL) 100 MG 24 hr tablet     multivitamin w/minerals (THERA-VIT-M) tablet     omeprazole (PRILOSEC) 20 MG DR capsule     oxyCODONE (ROXICODONE) 5 MG tablet     polyethylene glycol (MIRALAX) 17 g packet     Semaglutide-Weight Management 0.5 MG/0.5ML SOAJ     spironolactone (ALDACTONE) 25 MG tablet     tamsulosin (FLOMAX) 0.4 MG  "capsule     valsartan (DIOVAN) 160 MG tablet     acetaminophen (TYLENOL) 500 MG tablet     No current facility-administered medications for this visit.     BP (!) 155/82 (BP Location: Right arm, Patient Position: Sitting, Cuff Size: Adult Large)   Pulse 77   Ht 1.803 m (5' 10.98\")   Wt 118.5 kg (261 lb 4.8 oz)   SpO2 97%   BMI 36.46 kg/m      PHYSICAL EXAMINATION:    GENERAL:  He looks largely unchanged.  HEENT:  No scleral icterus or temporal muscle wasting.  CHEST:  Clear.  ABDOMEN:  No increase in girth.  No masses or tenderness to palpation is present.  His liver is 10 cm in span without left lobe enlargement.  No spleen tip is palpable.  EXTREMITIES:  No edema.  SKIN:  No stigmata of chronic liver disease.  NEUROLOGIC:  No asterixis.    Recent Results (from the past 168 hour(s))   CBC with platelets    Collection Time: 04/07/23  9:02 AM   Result Value Ref Range    WBC Count 3.2 (L) 4.0 - 11.0 10e3/uL    RBC Count 3.74 (L) 4.40 - 5.90 10e6/uL    Hemoglobin 12.0 (L) 13.3 - 17.7 g/dL    Hematocrit 36.4 (L) 40.0 - 53.0 %    MCV 97 78 - 100 fL    MCH 32.1 26.5 - 33.0 pg    MCHC 33.0 31.5 - 36.5 g/dL    RDW 13.4 10.0 - 15.0 %    Platelet Count 50 (L) 150 - 450 10e3/uL   Basic metabolic panel    Collection Time: 04/07/23  9:02 AM   Result Value Ref Range    Sodium 142 136 - 145 mmol/L    Potassium 4.3 3.4 - 5.3 mmol/L    Chloride 106 98 - 107 mmol/L    Carbon Dioxide (CO2) 25 22 - 29 mmol/L    Anion Gap 11 7 - 15 mmol/L    Urea Nitrogen 23.0 8.0 - 23.0 mg/dL    Creatinine 1.32 (H) 0.67 - 1.17 mg/dL    Calcium 9.4 8.8 - 10.2 mg/dL    Glucose 121 (H) 70 - 99 mg/dL    GFR Estimate 58 (L) >60 mL/min/1.73m2   Hepatic function panel    Collection Time: 04/07/23  9:02 AM   Result Value Ref Range    Protein Total 7.0 6.4 - 8.3 g/dL    Albumin 3.9 3.5 - 5.2 g/dL    Bilirubin Total 1.2 <=1.2 mg/dL    Alkaline Phosphatase 126 40 - 129 U/L    AST 50 10 - 50 U/L    ALT 58 (H) 10 - 50 U/L    Bilirubin Direct 0.34 (H) 0.00 - " 0.30 mg/dL   INR    Collection Time: 04/07/23  9:02 AM   Result Value Ref Range    INR 1.16 (H) 0.85 - 1.15   AFP tumor marker    Collection Time: 04/07/23  9:02 AM   Result Value Ref Range    AFP tumor marker <1.8 <=8.3 ng/mL   Hemoglobin A1c    Collection Time: 04/07/23  9:02 AM   Result Value Ref Range    Hemoglobin A1C 6.6 (H) <5.7 %      EXAMINATION: US ABDOMEN COMPLETE,  4/7/2023 8:54 AM      COMPARISON: CT chest abdomen pelvis 2/20/2023, abdominal ultrasound 7/7/2022, liver MRI 2/7/2021     History: Cirrhosis of liver without ascites, unspecified hepatic cirrhosis type (H).      TECHNIQUE: The abdomen was scanned in standard fashion with specialized ultrasound transducer(s) using both gray-scale and limited  color Doppler techniques.     FINDINGS:  Pancreas: Visualized portions of the head and body of the pancreas are  unremarkable.      Liver: The liver measures 17.6 cm and demonstrates cirrhotic morphology. Grossly similar approximate 7 mm echogenic focus in the  left lobe, not as well-seen as on prior imaging. No new focal lesion  demonstrated. The main portal vein is patent with antegrade flow.    US visualization score: B - Moderate limitations     Gallbladder: Cholelithiasis without sonographic findings to suggest acute cholecystitis.     Bile Ducts: Both the intra- and extrahepatic biliary system are of normal caliber.  The common bile duct measures 5 mm in diameter.     Right Kidney: Measures 14.3 cm in length with normal parenchymal  echogenicity and cortical thickness. No hydronephrosis.  Left Kidney: Measures 14.1 cm in length with normal parenchymal  echogenicity and cortical thickness. No hydronephrosis. Redemonstrated  benign-appearing renal cysts in the lower pole measuring up to 5.0 cm  maximally.     Spleen: The spleen is similarly enlarged,  measuring 18.6 cm in length.     Aorta and IVC: The visualized portions of the aorta and IVC are  unremarkable.      Fluid: None in the visualized  abdomen.                                                                    IMPRESSION:   1. Cirrhosis with grossly stable subcentimeter echogenic focus in the  left lobe, better seen on prior imaging. No new focal lesion.    a. LI-RADS US Category: US-1 Negative: No US evidence of HCC    b. Recommend continued surveillance US.  2. Cholelithiasis without evidence for acute cholecystitis.  3. Sequela of portal hypertension including splenomegaly.    IMPRESSION:  Mr. Logan has well-compensated cirrhosis caused by primarily nonalcoholic fatty liver disease.  His disease again is well compensated based on his ultrasound and blood work.  I will not be making any change to his medical regimen.  He is due for an upper GI endoscopy to screen for esophageal varices, which I have gone ahead and ordered.  Otherwise, I will see him back in the clinic in 6 months for repeat imaging and blood work.    I did spend a total of 40 minutes (on the date of the encounter), including 30 minutes of face-to-face clinic time including counseling. The rest of the time was spent in documentation and review of records.     Thank you very much for allowing me to participate in the care of this patient.  If you have any questions regarding recommendations, please do not hesitate to contact me.         Mata Renteria MD      Professor of Medicine  [employer]      Executive Medical Director, Solid Organ Transplant Program  [facility]

## 2023-04-10 ENCOUNTER — TELEPHONE (OUTPATIENT)
Dept: FAMILY MEDICINE | Facility: CLINIC | Age: 73
End: 2023-04-10
Payer: COMMERCIAL

## 2023-04-10 DIAGNOSIS — R42 DIZZINESS: Primary | ICD-10-CM

## 2023-04-10 DIAGNOSIS — R26.89 BALANCE PROBLEMS: ICD-10-CM

## 2023-04-10 NOTE — TELEPHONE ENCOUNTER
I did referral for this but he should also check with his insurance    Please inform patient    Richi Jaramillo MD

## 2023-04-10 NOTE — TELEPHONE ENCOUNTER
Reason for Call:  Other referral    Detailed comments: patient called and would like a referral from Clint Jiang at [facility] for the [facility] in [place].    Please contact patient today.  Thank you.    Phone Number Patient can be reached at: Cell number on file:    Telephone Information:   Mobile [phone]       Best Time: any    Can we leave a detailed message on this number? YES    Call taken on 4/10/2023 at 8:25 AM by Monica Hughes

## 2023-04-11 ENCOUNTER — TELEPHONE (OUTPATIENT)
Dept: GASTROENTEROLOGY | Facility: CLINIC | Age: 73
End: 2023-04-11
Payer: COMMERCIAL

## 2023-04-11 NOTE — TELEPHONE ENCOUNTER
Patient called and stated the [facility] in [place] still has not received this referral. He asked if Dr. Jaramillo could please fax this as soon as possible to: [phone].    Thank you  Lorrie Maradiaga Scheduling

## 2023-04-11 NOTE — TELEPHONE ENCOUNTER
Screening Questions  BLUE  KIND OF PREP RED  LOCATION [review exclusion criteria] GREEN  SEDATION TYPE        Y Are you active on mychart?       ANEESH FINK Ordering/Referring Provider?        [employer] What type of coverage do you have?      N Have you had a positive covid test in the last 14 days?     36.4 1. BMI  [BMI 40+ - review exclusion criteria]    Y  2. Are you able to give consent for your medical care? [IF NO,RN REVIEW]          Y  3. Are you taking any prescription pain medications on a routine schedule   (ex narcotics: oxycodone, roxicodone, oxycontin,  and percocet)? [RN Review]        OXYCODONE   3a. EXTENDED PREP What kind of prescription?     N 4. Do you have any chemical dependencies such as alcohol, street drugs, or methadone?        **If yes 3- 5 , please schedule with MAC sedation.**          IF YES TO ANY 6 - 10 - HOSPITAL SETTING ONLY.     N 6.   Do you need assistance transferring?     N 7.   Have you had a heart or lung transplant?    N 8.   Are you currently on dialysis?   N 9.   Do you use daily home oxygen?   N 10. Do you take nitroglycerin?   10a.  If yes, how often?     11. [FEMALES]  NA Are you currently pregnant?    11a.  If yes, how many weeks? [ Greater than 12 weeks, OR NEEDED]    N 12. Do you have Pulmonary Hypertension? *NEED PAC APPT AT [facility] w/ MAC*     N 13. [review exclusion criteria]  Do you have any implantable devices in your body (pacemaker, defib, LVAD)?    N 14. In the past 6 months, have you had any heart related issues including cardiomyopathy or heart attack?     14a. N If yes, did it require cardiac stenting if so when?     N 15. Have you had a stroke or Transient ischemic attack (TIA - aka  mini stroke ) within 6 months?      Y 16. Do you have mod to severe Obstructive Sleep Apnea?  [Hospital only]    N 17. Do you have SEVERE AND UNCONTROLLED asthma? *NEED PAC APPT AT [facility] w/MAC*     18. Are you currently taking any blood thinners?     18a. No. Continue to  "19.   18b. Yes/no Blood Thinner: No [CONTINUE TO #19]    N 19. Do you take the medication Phentermine?    19a. If yes, \"Hold for 7 days before procedure.  Please consult your prescribing provider if you have questions about holding this medication.\"     N  20. Do you have chronic kidney disease?      N  21. Do you have a diagnosis of diabetes?     N  22. On a regular basis do you go 3-5 days between bowel movements?      23. Preferred LOCAL Pharmacy for Pre Prescription    [ LIST ONLY ONE PHARMACY]        [facility] [address] [place] [address] [address] [place] [address], [place]        - CLOSING REMINDERS -    Informed patient they will need an adult    Cannot take any type of public or medical transportation alone    Conscious Sedation- Needs  for 6 hours after the procedure       MAC/General-Needs  for 24 hours after procedure    Pre-Procedure Covid test to be completed [facility]]    Confirmed Nurse will call to complete assessment       - SCHEDULING DETAILS -  Y Hospital Setting Required? If yes, what is the exclusion?: SHONDA CASTILLO  Surgeon    05/25/2023  Date of Procedure  Upper Endoscopy [EGD]  Type of Procedure Scheduled  [facility]   Which Colonoscopy Prep was Sent?     CS Sedation Type     N PAC / Pre-op Required               "

## 2023-04-11 NOTE — TELEPHONE ENCOUNTER
1st attempted to call. Only one number on file and unable to LVM. Mailbox is not set up.     Dulce Soriano -    [facility]

## 2023-04-12 ENCOUNTER — OFFICE VISIT (OUTPATIENT)
Dept: FAMILY MEDICINE | Facility: CLINIC | Age: 73
End: 2023-04-12
Payer: COMMERCIAL

## 2023-04-12 ENCOUNTER — TELEPHONE (OUTPATIENT)
Dept: NEUROSURGERY | Facility: CLINIC | Age: 73
End: 2023-04-12

## 2023-04-12 VITALS
BODY MASS INDEX: 36.71 KG/M2 | SYSTOLIC BLOOD PRESSURE: 124 MMHG | RESPIRATION RATE: 18 BRPM | DIASTOLIC BLOOD PRESSURE: 73 MMHG | WEIGHT: 262.25 LBS | HEIGHT: 71 IN | OXYGEN SATURATION: 96 % | HEART RATE: 76 BPM | TEMPERATURE: 97.4 F

## 2023-04-12 DIAGNOSIS — K74.69 OTHER CIRRHOSIS OF LIVER (H): Chronic | ICD-10-CM

## 2023-04-12 DIAGNOSIS — S22.000A COMPRESSION FRACTURE OF THORACIC VERTEBRA, UNSPECIFIED THORACIC VERTEBRAL LEVEL, INITIAL ENCOUNTER (H): Primary | ICD-10-CM

## 2023-04-12 DIAGNOSIS — J20.9 ACUTE BRONCHITIS, UNSPECIFIED ORGANISM: ICD-10-CM

## 2023-04-12 DIAGNOSIS — M79.2 NERVE PAIN: ICD-10-CM

## 2023-04-12 DIAGNOSIS — I10 HYPERTENSION GOAL BP (BLOOD PRESSURE) < 140/90: ICD-10-CM

## 2023-04-12 PROCEDURE — 99214 OFFICE O/P EST MOD 30 MIN: CPT | Performed by: FAMILY MEDICINE

## 2023-04-12 RX ORDER — TRAMADOL HYDROCHLORIDE 50 MG/1
50 TABLET ORAL EVERY 6 HOURS PRN
Qty: 30 TABLET | Refills: 0 | Status: SHIPPED | OUTPATIENT
Start: 2023-04-12 | End: 2023-05-09

## 2023-04-12 RX ORDER — DOXYCYCLINE 100 MG/1
100 CAPSULE ORAL 2 TIMES DAILY
Qty: 28 CAPSULE | Refills: 0 | Status: SHIPPED | OUTPATIENT
Start: 2023-04-12 | End: 2023-04-27

## 2023-04-12 RX ORDER — PREGABALIN 25 MG/1
25 CAPSULE ORAL 3 TIMES DAILY PRN
Qty: 30 CAPSULE | Refills: 1 | Status: SHIPPED | OUTPATIENT
Start: 2023-04-12 | End: 2023-06-19

## 2023-04-12 ASSESSMENT — PAIN SCALES - GENERAL: PAINLEVEL: MODERATE PAIN (5)

## 2023-04-12 NOTE — PATIENT INSTRUCTIONS
Increase walking as able    Could use over the counter melatonin 1-5 mg at night as needed    Try to minimize oxycodone usage    Get dizziness eval     Take  the antibiotic    Call if cough  not better

## 2023-04-12 NOTE — PROGRESS NOTES
"Lakhwinder Lemos is a 72 year old, presenting for the following health issues:  Patient Request        4/12/2023     1:45 PM   Additional Questions   Roomed by Sofia Conde     History of Present Illness       Reason for visit:  Vertigo and vomiting  Symptom onset:  More than a month    He eats 2-3 servings of fruits and vegetables daily.He consumes 1 sweetened beverage(s) daily.He exercises with enough effort to increase his heart rate 20 to 29 minutes per day.  He exercises with enough effort to increase his heart rate 7 days per week.   He is taking medications regularly.                 Review of Systems     Fell in late Feb, has two thoracic compression fractures    Wearing back brace    Feeling better now    Less nausea but some    Occasional vomiting    Has oxy but trying to minimize that    Robaxin    Oxycodone for heavy pain    Has tramadol available also           Objective    /73 (BP Location: Left arm, Patient Position: Chair, Cuff Size: Adult Large)   Pulse 76   Temp 97.4  F (36.3  C) (Temporal)   Resp 18   Ht 1.803 m (5' 10.98\")   Wt 119 kg (262 lb 4 oz)   SpO2 96%   BMI 36.59 kg/m    Body mass index is 36.59 kg/m .  Physical Exam  HENT:      Head: Normocephalic and atraumatic.   Cardiovascular:      Rate and Rhythm: Normal rate and regular rhythm.      Heart sounds: Normal heart sounds.   Pulmonary:      Effort: Pulmonary effort is normal. No respiratory distress.      Breath sounds: Normal breath sounds.   Neurological:      Mental Status: He is alert.      patient has large back brace on     Breathing fine    Only mild edema lower legs, symmetric    Radials symmetric          ASSESSMENT / PLAN:  (S22.000A) Compression fracture of thoracic vertebra, unspecified thoracic vertebral level, initial encounter (H)  (primary encounter diagnosis)  Comment: refill this.  Try to use sparingly.  Also has oxycodone; use that very sparingly. Increase walking as able. Brace use per " specialist   Plan: traMADol (ULTRAM) 50 MG tablet             (M79.2) Nerve pain  Comment: using this instead of gabapentin  Plan: pregabalin (LYRICA) 25 MG capsule             (J20.9) Acute bronchitis, unspecified organism  Comment: has productive cough, not improving.  Therapeutic trial of antibiotic.   Plan: doxycycline hyclate (VIBRAMYCIN) 100 MG capsule             (I10) Hypertension goal BP (blood pressure) < 140/90  Comment: at goal   Plan: no change     (K74.69) Other cirrhosis of liver (H)  Comment: recently saw specialist, stable  Plan: as above; went over recent labs also       I reviewed the patient's medications, allergies, medical history, family history, and social history.    Richi Jaramillo MD

## 2023-04-12 NOTE — TELEPHONE ENCOUNTER
The referral has been faxed to [phone] today and was also sent on Monday 04/10/2023. If patient calls and reports unreceived again please advise patient to make sure this is the correct fax number. Spoke to patient on the phone and let them know that the referral has been faxed to the number provided twice. And was advised to check with insurance.       Dulce Soriano -    [facility]

## 2023-04-13 ENCOUNTER — TELEPHONE (OUTPATIENT)
Dept: NEUROSURGERY | Facility: CLINIC | Age: 73
End: 2023-04-13
Payer: COMMERCIAL

## 2023-04-14 ENCOUNTER — TELEPHONE (OUTPATIENT)
Dept: NEUROSURGERY | Facility: CLINIC | Age: 73
End: 2023-04-14
Payer: COMMERCIAL

## 2023-04-24 ENCOUNTER — TELEPHONE (OUTPATIENT)
Dept: NEUROSURGERY | Facility: CLINIC | Age: 73
End: 2023-04-24
Payer: COMMERCIAL

## 2023-04-24 NOTE — TELEPHONE ENCOUNTER
[facility] Call Center    Phone Message    May a detailed message be left on voicemail: no     Reason for Call: Other: Pt is requesting Phill from Dr. Del Rio's office to return his call regarding medical equipment and restrictions. Please call pt back at # [phone]     Action Taken: Message routed to:  [facility]surgery     Travel Screening: Not Applicable

## 2023-04-24 NOTE — TELEPHONE ENCOUNTER
Writer routed to Neurosurgery Nurse Pool   Attn: Aureliano Oliveira, RNCC for Dr. Del Rio   *Question on restrictions     Mily Linder LPN  Neurosurgery

## 2023-04-25 DIAGNOSIS — S22.009A THORACIC VERTEBRAL FRACTURE (H): Primary | ICD-10-CM

## 2023-04-28 ENCOUNTER — TRANSFERRED RECORDS (OUTPATIENT)
Dept: HEALTH INFORMATION MANAGEMENT | Facility: CLINIC | Age: 73
End: 2023-04-28
Payer: COMMERCIAL

## 2023-05-10 ENCOUNTER — TELEPHONE (OUTPATIENT)
Dept: GASTROENTEROLOGY | Facility: CLINIC | Age: 73
End: 2023-05-10

## 2023-05-10 ENCOUNTER — TELEPHONE (OUTPATIENT)
Dept: PHARMACY | Facility: CLINIC | Age: 73
End: 2023-05-10
Payer: COMMERCIAL

## 2023-05-10 NOTE — TELEPHONE ENCOUNTER
Patient scheduled for Upper endoscopy (EGD) on 5/25/2023.     Discuss Covid policy.     Pre op exam needed? N/A    Arrival time: 1045. Procedure time 1145    Facility location: [facility]; [address] [place] [address], [address] Floor, [place] [address]    Sedation type: Conscious sedation     NSAIDs? verify    Anticoagulations? No    Electronic implanted devices? No    Diabetic? No    Indication for procedure: Variceal screen    Prep instructions sent via letter     Pre visit planning completed.    Gala Arcos RN  Endoscopy Procedure Pre Assessment RN

## 2023-05-10 NOTE — TELEPHONE ENCOUNTER
Attempted to contact patient regarding upcoming Upper endoscopy (EGD) procedure on 5/25/2023 for pre assessment questions. No answer.     Unable to leave message due to VM not set up.    Gala Arcos, RN  Endoscopy Procedure Pre Assessment RN

## 2023-05-10 NOTE — TELEPHONE ENCOUNTER
Called to schedule MTM appt, he was referred by his insurance company, outreach attempt #1, no answer, LVM, unable to leave voicemail.    Dee Hernandez, PharmD  Medication Therapy Management Pharmacist  [phone]  To make an appointment, please call our MTM scheduling line at [phone].

## 2023-05-18 ENCOUNTER — OFFICE VISIT (OUTPATIENT)
Dept: FAMILY MEDICINE | Facility: CLINIC | Age: 73
End: 2023-05-18
Payer: COMMERCIAL

## 2023-05-18 VITALS
HEART RATE: 77 BPM | SYSTOLIC BLOOD PRESSURE: 126 MMHG | HEIGHT: 71 IN | DIASTOLIC BLOOD PRESSURE: 70 MMHG | TEMPERATURE: 98 F | OXYGEN SATURATION: 96 % | RESPIRATION RATE: 18 BRPM | BODY MASS INDEX: 36.42 KG/M2 | WEIGHT: 260.13 LBS

## 2023-05-18 DIAGNOSIS — I10 HYPERTENSION GOAL BP (BLOOD PRESSURE) < 140/90: ICD-10-CM

## 2023-05-18 DIAGNOSIS — S22.000A COMPRESSION FRACTURE OF THORACIC VERTEBRA, UNSPECIFIED THORACIC VERTEBRAL LEVEL, INITIAL ENCOUNTER (H): ICD-10-CM

## 2023-05-18 DIAGNOSIS — R06.09 DYSPNEA ON EXERTION: Primary | ICD-10-CM

## 2023-05-18 DIAGNOSIS — R42 DIZZINESS: ICD-10-CM

## 2023-05-18 DIAGNOSIS — E08.49 OTHER DIABETIC NEUROLOGICAL COMPLICATION ASSOCIATED WITH DIABETES MELLITUS DUE TO UNDERLYING CONDITION (H): ICD-10-CM

## 2023-05-18 PROCEDURE — 99214 OFFICE O/P EST MOD 30 MIN: CPT | Performed by: FAMILY MEDICINE

## 2023-05-18 ASSESSMENT — PAIN SCALES - GENERAL: PAINLEVEL: NO PAIN (0)

## 2023-05-18 NOTE — TELEPHONE ENCOUNTER
Contacted patient regarding upcoming EGD appointment.     Patient states he is currently in the process of moving to [place]. Patient would like to reschedule procedure to [facility] due to lack of  to [facility].     Patient states he has moderate sleep apnea, according to his chart does not routinely use CPAP. RN did reach out to scheduling staff regarding rescheduling patient with SHONDA to [place]. Per scheduling staff since it is a hospital setting that should be fine.     Did advise patient procedure could be done in [place] at the hospital. Patient will find a . Patient also advised he will need someone to stay with him after his procedure for up to 24 hours.     After discussion patient states he may still keep his [facility] appointment. RN did not that patient is currently taking ozempic for weight loss. Patient advised that if he chooses to keep his appointment at [facility] he will need to make sure he does not use Ozempic after today.     Patient verbalizes agreement and understanding with all information. Will call back to scheduling line to schedule in [place] if he chooses.       Gerda Unger RN  Endoscopy Procedure Pre Assessment RN

## 2023-05-18 NOTE — TELEPHONE ENCOUNTER
Caller: Gucci Logan    Reason for Reschedule/Cancellation (please be detailed, any staff messages or encounters to note?): PT DID NOT HAVE A RIDE TO [facility]/ WANTED TO CHANGE LOCATIONS TO       Prior to reschedule please review:    Ordering Provider:ANEESH FINK    Sedation per order:MODERATE    Does patient have any ASC Exclusions, please identify?: Y - SHONDA      Notes on Cancelled Procedure:    Procedure:Upper Endoscopy [EGD]     Date: 05/25/2023    Location:[facility]; [address], [address], [place] [address]    Surgeon: TERESITA CASTILLO        Rescheduled: Yes    Procedure: Upper Endoscopy [EGD]     Date: 05/23/2023    Location:[facility]; [address] , [place] [address]    Surgeon: LONG    Sedation Level Scheduled  MAC,  Reason for Sedation Level PER LOCATION    Prep/Instructions updated and sent: LETTER

## 2023-05-18 NOTE — PATIENT INSTRUCTIONS
Schedule the lexiscan  stress test     Try to stay active as able - if stress test okay then okay to push it    Take deep breaths

## 2023-05-22 NOTE — H&P
[facility] Anesthesia Pre-op History and Physical    Gucci Logan MRN# [id number]   Age: 72 year old YOB: 1950      Date of Surgery: 5/23/2023 Location [facility]      Date of Exam 5/23/2023 Facility (Same day)       Home clinic: [facility]  Primary care provider: Richi Jaramillo         Chief Complaint and/or Reason for Procedure:   No chief complaint on file.  EGD.  Follow up liver disease 2019 exam. NAFLD.         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) 02/28/2023     Priority: Medium     Compression fracture of T6 vertebra with routine healing 02/20/2023     Priority: Medium     Syncope, unspecified syncope type 02/20/2023     Priority: Medium     Fall 02/20/2023     Priority: Medium     Abdominal pain 07/09/2020     Priority: Medium     Portal vein thrombosis 07/09/2020     Priority: Medium     Non-insulin dependent type 2 diabetes mellitus (H) 02/20/2020     Priority: Medium     Other cirrhosis of liver (H) 02/20/2020     Priority: Medium     Obesity (BMI 35.0-39.9) with comorbidity (H) 11/09/2018     Priority: Medium     Diabetes mellitus, type 2 (H) 11/09/2018     Priority: Medium     Hyperlipidemia LDL goal <100 04/19/2017     Priority: Medium     Impaired fasting glucose 04/19/2017     Priority: Medium     Gastroesophageal reflux disease, esophagitis presence not specified 10/06/2016     Priority: Medium     IMO Regulatory Load OCT 2020       Obesity, unspecified obesity severity, unspecified obesity type 12/17/2015     Priority: Medium     SHONDA (obstructive sleep apnea) 02/07/2014      Priority: Medium     Hypertension goal BP (blood pressure) < 140/90 02/08/2013     Priority: Medium     Advanced directives, counseling/discussion 01/16/2013     Priority: Medium     Advance Care Planning:   ACP Review and Resources Provided:  Reviewed chart for advance care plan.  Gucci Logan has no plan or code status on file however states presence of ACP document. Copy requested. Confirmed code status reflects current choices pending receipt of document/advance care plan review. Confirmed/documented designated decision maker(s). See permanent comments section of demographics in clinical tab.   Added by Elyssa Olguin on 7/22/2014  Patient states has Advance Directive and will bring in a copy to clinic. 1/16/2013              CARDIOVASCULAR SCREENING; LDL GOAL LESS THAN 130 01/16/2013     Priority: Medium     Vitamin D deficiency 09/19/2012     Priority: Medium     Overview:   9/19/2012       Osteoarthrosis 09/18/2012     Priority: Medium     Overview:   Lumbar spine, knees       Left ventricular hypertrophy 11/18/2011     Priority: Medium     Overview:   11/18/2011       Thrombocytopenia (H) 08/28/2008     Priority: Medium     Overview:   8/28/2008, attributed to liver disease with portal hypertension and functional splenomegaly       Splenomegaly 07/25/2008     Priority: Medium     Overview:   7/25/2009, attributed to portal hypertension from cirrhosis       Lymphadenopathy 06/20/2008     Priority: Medium     Overview:   6/20/2008 5/31/2011, CT: Increasing mediastinal and hilar lymphadenopathy and mild increase in perigastric lymph node. Findings are concerning for atypical infectious process. In the absence of pulmonary findings, with lymphomatous disorder in the differential. Clinical correlation is recommended.       Jaundice 05/31/2008     Priority: Medium     Chronic hepatitis C virus infection (H) 05/31/2008     Priority: Medium            Medications (include herbals and vitamins):   Any  Plavix use in the last 7 days? No     No current facility-administered medications for this encounter.     Current Outpatient Medications   Medication Sig     atorvastatin (LIPITOR) 10 MG tablet Take 1 tablet (10 mg) by mouth daily     cholecalciferol 25 MCG (1000 UT) TABS Take 1,000 Units by mouth daily 2 tabs daily     hydrochlorothiazide (HYDRODIURIL) 50 MG tablet Take 1 tablet by mouth once daily     levothyroxine (SYNTHROID/LEVOTHROID) 25 MCG tablet Take 1 tablet (25 mcg) by mouth daily     metFORMIN (GLUCOPHAGE) 500 MG tablet Take 1 tablet (500 mg) by mouth 2 times daily (with meals)     methocarbamol (ROBAXIN) 500 MG tablet TAKE 1 TABLET BY MOUTH EVERY 8 HOURS AS NEEDED FOR MUSCLE SPASM     metoprolol succinate ER (TOPROL XL) 100 MG 24 hr tablet Take 1 tablet by mouth once daily     multivitamin w/minerals (THERA-VIT-M) tablet Take 1 tablet by mouth daily     omeprazole (PRILOSEC) 20 MG DR capsule TAKE 1 CAPSULE BY MOUTH ONCE DAILY 30 TO 60 MINUTES BEFORE A MEAL (Patient taking differently: 20 mg daily TAKE 1 CAPSULE BY MOUTH ONCE DAILY 30 TO 60 MINUTES BEFORE A MEAL)     oxyCODONE (ROXICODONE) 5 MG tablet Take 1 tablet (5 mg) by mouth every 4 hours as needed for severe pain     OZEMPIC, 0.25 OR 0.5 MG/DOSE, 2 MG/3ML pen INJECT 0.25 MG SUBCUTANEOUSLY ONCE A WEEK FOR 1 MONTH AND START 0.5 MG WEEKLY DOSE     polyethylene glycol (MIRALAX) 17 g packet Take 17 g by mouth daily as needed for constipation     pregabalin (LYRICA) 25 MG capsule Take 1 capsule (25 mg) by mouth 3 times daily as needed (nerve pain)     spironolactone (ALDACTONE) 25 MG tablet Take 1 tablet (25 mg) by mouth daily     tamsulosin (FLOMAX) 0.4 MG capsule Take 1 capsule (0.4 mg) by mouth daily     traMADol (ULTRAM) 50 MG tablet TAKE 1 TABLET BY MOUTH EVERY 6 HOURS AS NEEDED FOR MODERATE TO SEVERE PAIN     valsartan (DIOVAN) 160 MG tablet Take 1 tablet by mouth once daily             Allergies:      Allergies   Allergen Reactions     Bee Venom       Influenza Vaccines Other (See Comments)     delirium  fever       Allergy to Latex? No  Allergy to tape?   No  Intolerances:             Physical Exam:   All vitals have been reviewed  No data found.  No intake/output data recorded.  Lungs:   No increased work of breathing, good air exchange, clear to auscultation bilaterally, no crackles or wheezing     Cardiovascular:   Normal apical impulse, regular rate and rhythm, normal S1 and S2, no S3 or S4, and no murmur noted             Lab / Radiology Results:            Anesthetic risk and/or ASA classification:       Rafa Renee MD

## 2023-05-23 ENCOUNTER — HOSPITAL ENCOUNTER (OUTPATIENT)
Facility: CLINIC | Age: 73
Discharge: HOME | End: 2023-05-23
Attending: INTERNAL MEDICINE | Admitting: INTERNAL MEDICINE
Payer: COMMERCIAL

## 2023-05-23 ENCOUNTER — ANESTHESIA EVENT (OUTPATIENT)
Dept: GASTROENTEROLOGY | Facility: CLINIC | Age: 73
End: 2023-05-23
Payer: COMMERCIAL

## 2023-05-23 ENCOUNTER — ANESTHESIA (OUTPATIENT)
Dept: GASTROENTEROLOGY | Facility: CLINIC | Age: 73
End: 2023-05-23
Payer: COMMERCIAL

## 2023-05-23 VITALS
TEMPERATURE: 98.1 F | SYSTOLIC BLOOD PRESSURE: 146 MMHG | DIASTOLIC BLOOD PRESSURE: 75 MMHG | OXYGEN SATURATION: 95 % | RESPIRATION RATE: 16 BRPM

## 2023-05-23 LAB — UPPER GI ENDOSCOPY: NORMAL

## 2023-05-23 PROCEDURE — 250N000011 HC RX IP 250 OP 636: Performed by: NURSE ANESTHETIST, CERTIFIED REGISTERED

## 2023-05-23 PROCEDURE — 82962 GLUCOSE BLOOD TEST: CPT | Performed by: INTERNAL MEDICINE

## 2023-05-23 PROCEDURE — 43235 EGD DIAGNOSTIC BRUSH WASH: CPT | Performed by: INTERNAL MEDICINE

## 2023-05-23 PROCEDURE — 250N000009 HC RX 250: Performed by: NURSE ANESTHETIST, CERTIFIED REGISTERED

## 2023-05-23 PROCEDURE — 258N000003 HC RX IP 258 OP 636: Performed by: NURSE ANESTHETIST, CERTIFIED REGISTERED

## 2023-05-23 PROCEDURE — 370N000017 HC ANESTHESIA TECHNICAL FEE, PER MIN: Performed by: INTERNAL MEDICINE

## 2023-05-23 RX ORDER — PROPOFOL 10 MG/ML
INJECTION, EMULSION INTRAVENOUS CONTINUOUS PRN
Status: DISCONTINUED | OUTPATIENT
Start: 2023-05-23 | End: 2023-05-23

## 2023-05-23 RX ORDER — ONDANSETRON 2 MG/ML
4 INJECTION INTRAMUSCULAR; INTRAVENOUS EVERY 30 MIN PRN
Status: DISCONTINUED | OUTPATIENT
Start: 2023-05-23 | End: 2023-05-23 | Stop reason: HOSPADM

## 2023-05-23 RX ORDER — LIDOCAINE HYDROCHLORIDE 20 MG/ML
INJECTION, SOLUTION INFILTRATION; PERINEURAL PRN
Status: DISCONTINUED | OUTPATIENT
Start: 2023-05-23 | End: 2023-05-23

## 2023-05-23 RX ORDER — LIDOCAINE 40 MG/G
CREAM TOPICAL
Status: DISCONTINUED | OUTPATIENT
Start: 2023-05-23 | End: 2023-05-23 | Stop reason: HOSPADM

## 2023-05-23 RX ORDER — PROPOFOL 10 MG/ML
INJECTION, EMULSION INTRAVENOUS PRN
Status: DISCONTINUED | OUTPATIENT
Start: 2023-05-23 | End: 2023-05-23

## 2023-05-23 RX ORDER — ONDANSETRON 4 MG/1
4 TABLET, ORALLY DISINTEGRATING ORAL EVERY 30 MIN PRN
Status: DISCONTINUED | OUTPATIENT
Start: 2023-05-23 | End: 2023-05-23 | Stop reason: HOSPADM

## 2023-05-23 RX ORDER — SODIUM CHLORIDE, SODIUM LACTATE, POTASSIUM CHLORIDE, CALCIUM CHLORIDE 600; 310; 30; 20 MG/100ML; MG/100ML; MG/100ML; MG/100ML
INJECTION, SOLUTION INTRAVENOUS CONTINUOUS
Status: DISCONTINUED | OUTPATIENT
Start: 2023-05-23 | End: 2023-05-23 | Stop reason: HOSPADM

## 2023-05-23 RX ADMIN — SODIUM CHLORIDE, POTASSIUM CHLORIDE, SODIUM LACTATE AND CALCIUM CHLORIDE: 600; 310; 30; 20 INJECTION, SOLUTION INTRAVENOUS at 10:18

## 2023-05-23 RX ADMIN — PROPOFOL 80 MG: 10 INJECTION, EMULSION INTRAVENOUS at 10:31

## 2023-05-23 RX ADMIN — PROPOFOL 150 MCG/KG/MIN: 10 INJECTION, EMULSION INTRAVENOUS at 10:31

## 2023-05-23 RX ADMIN — LIDOCAINE HYDROCHLORIDE 80 MG: 20 INJECTION, SOLUTION INFILTRATION; PERINEURAL at 10:31

## 2023-05-23 ASSESSMENT — ACTIVITIES OF DAILY LIVING (ADL): ADLS_ACUITY_SCORE: 35

## 2023-05-23 NOTE — ANESTHESIA PREPROCEDURE EVALUATION
Anesthesia Pre-Procedure Evaluation    Patient: Gucci Logan   MRN: [id number] : 1950        Procedure : Procedure(s):  Esophagoscopy, gastroscopy, duodenoscopy (EGD), combined          Past Medical History:   Diagnosis Date     Arthritis      Chronic, continuous use of opioids      Esophageal reflux 2014     Hearing problem      Hiatal hernia      Hypertension      Jaundice 2008     Liver disease      Obesity 2013     Obstructive sleep apnea      Sleep apnea     Advised CPAP machine. Not keen to use it.      Syncope, unspecified syncope type 2023      Past Surgical History:   Procedure Laterality Date     ARTHROSCOPY KNEE RT/LT      (L) with partial medial meniscectomy     CATARACT IOL, RT/LT  Nov and Dec 2017     COLONOSCOPY N/A 2019    Procedure: COLONOSCOPY, WITH POLYPECTOMY AND BIOPSY;  Surgeon: Leventhal, Thomas Michael, MD;  Location: [facility] OR     COLONOSCOPY N/A 2022    Procedure: COLONOSCOPY;  Surgeon: Rafa Renee MD;  Location:  GI     DACRYOCYSTORHINOSTOMY Left 10/16/2018    Procedure: DACRYOCYSTORHINOSTOMY;  Surgeon: Madhu Krause MD;  Location: [facility]     ENDOSCOPIC ENDONASAL SURGERY       ENDOSCOPY  2-19-15     ESOPHAGOSCOPY, GASTROSCOPY, DUODENOSCOPY (EGD), COMBINED N/A 2019    Procedure: COMBINED ESOPHAGOSCOPY, GASTROSCOPY, DUODENOSCOPY (EGD) - hold aspirin ibuprofen or naproxen for one week prior (per physician order);  Surgeon: Leventhal, Thomas Michael, MD;  Location:  OR     EYE SURGERY       Hernia surgery Left      NASAL/SINUS POLYPECTOMY       REPAIR PTOSIS Left 10/16/2018    Procedure: LEFT UPPER LID PTOSIS AND BILATERAL  BROW PTOSIS REPAIR WITH LEFT DACRYOCYSTORHINOSTOMY ;  Surgeon: Madhu Krause MD;  Location: [facility]     REPAIR PTOSIS BROW Bilateral 10/16/2018    Procedure: REPAIR PTOSIS BROW;  Surgeon: Madhu Krause MD;  Location: [facility]     ROTATOR CUFF REPAIR RT/LT Right      ZZC OPEN RX ANKLE  DISLOCATN+FIXATN      (R)     ZZC SPINAL FUSION,ANT,EA ADNL LEVEL      T12 - L1      Allergies   Allergen Reactions     Bee Venom      Influenza Vaccines Other (See Comments)     delirium  fever        Social History     Tobacco Use     Smoking status: Former     Packs/day: 0.00     Years: 5.00     Pack years: 0.00     Types: Cigarettes     Start date: 1990     Quit date: 1999     Years since quittin.4     Smokeless tobacco: Never   Vaping Use     Vaping status: Never Used   Substance Use Topics     Alcohol use: Yes     Comment: rare      Wt Readings from Last 1 Encounters:   23 118 kg (260 lb 2 oz)        Anesthesia Evaluation            ROS/MED HX  ENT/Pulmonary:     (+) sleep apnea,     Neurologic:  - neg neurologic ROS     Cardiovascular:     (+) Dyslipidemia hypertension-----fainting (syncope). Previous cardiac testing   Echo: Date: 2023 Results:  Interpretation Summary     Extremely poor image quality.  Left ventricular systolic function is normal.  The visual ejection fraction is 65-70%.  Suspect LVOT obstruction, attempts were made to measure, but due to quality of  images, cannot estimate degree  ______________________________________________________________________________  Left Ventricle  Left ventricular hypertrophy is noted by two-dimensional echocardiography. The  visual ejection fraction is 65-70%. Left ventricular systolic function is  normal. Grade I or early diastolic dysfunction. Diastolic Doppler findings  (E/E' ratio and/or other parameters) suggest left ventricular filling  pressures are increased. Regional wall motion abnormalities cannot be excluded  due to limited visualization.     Right Ventricle  The right ventricle is not well visualized. The right ventricular systolic  function is normal.     Atria  The left atrium is not well visualized. Right atrium not well visualized.     Mitral Valve  The mitral valve is not well visualized. There is moderate mitral  annular  calcification. There is trace mitral regurgitation. There is no mitral valve  stenosis.     Tricuspid Valve  The tricuspid valve is not well visualized. Right ventricular systolic  pressure could not be approximated due to inadequate tricuspid regurgitation.     Aortic Valve  The aortic valve is not well visualized. No hemodynamically significant  valvular aortic stenosis.     Pulmonic Valve  The pulmonic valve is not well visualized.     Vessels  Normal size aorta. The ascending aorta is Mildly dilated. Inferior vena cava  not well visualized for estimation of right atrial pressure.     Pericardium  The pericardium is not well visualized.  Stress Test: Date: Results:    ECG Reviewed: Date: Results:    Cath: Date: Results:      METS/Exercise Tolerance:     Hematologic:  - neg hematologic  ROS     Musculoskeletal:   (+) arthritis,     GI/Hepatic:     (+) GERD, Asymptomatic on medication, hiatal hernia, liver disease,     Renal/Genitourinary:       Endo:     (+) type II DM, Obesity,     Psychiatric/Substance Use:  - neg psychiatric ROS     Infectious Disease:  - neg infectious disease ROS     Malignancy:  - neg malignancy ROS     Other:            Physical Exam    Airway        Mallampati: II   TM distance: > 3 FB   Neck ROM: full   Mouth opening: > 3 cm    Respiratory Devices and Support         Dental           Cardiovascular   cardiovascular exam normal       Rhythm and rate: regular and normal     Pulmonary   pulmonary exam normal        breath sounds clear to auscultation           OUTSIDE LABS:  CBC:   Lab Results   Component Value Date    WBC 3.2 (L) 04/07/2023    WBC 6.1 02/21/2023    HGB 12.0 (L) 04/07/2023    HGB 12.4 (L) 02/21/2023    HCT 36.4 (L) 04/07/2023    HCT 38.3 (L) 02/21/2023    PLT 50 (L) 04/07/2023    PLT 75 (L) 02/21/2023     BMP:   Lab Results   Component Value Date     04/07/2023     02/21/2023    POTASSIUM 4.3 04/07/2023    POTASSIUM 4.1 02/21/2023    CHLORIDE 106  04/07/2023    CHLORIDE 102 02/21/2023    CO2 25 04/07/2023    CO2 26 02/21/2023    BUN 23.0 04/07/2023    BUN 24.0 (H) 02/21/2023    CR 1.32 (H) 04/07/2023    CR 0.97 02/21/2023     (H) 04/07/2023     (H) 02/23/2023     COAGS:   Lab Results   Component Value Date    PTT 27 02/20/2023    INR 1.16 (H) 04/07/2023     POC:   Lab Results   Component Value Date     (H) 07/10/2020     HEPATIC:   Lab Results   Component Value Date    ALBUMIN 3.9 04/07/2023    PROTTOTAL 7.0 04/07/2023    ALT 58 (H) 04/07/2023    AST 50 04/07/2023    ALKPHOS 126 04/07/2023    BILITOTAL 1.2 04/07/2023    SHANI 35 07/21/2021     OTHER:   Lab Results   Component Value Date    LACT 1.4 07/08/2020    A1C 6.6 (H) 04/07/2023    SOLEDAD 9.4 04/07/2023    MAG 1.9 02/22/2023    LIPASE 137 07/21/2021    TSH 9.19 (H) 02/21/2023    T4 1.07 02/21/2023    T3 104 08/10/2018    CRP 7.9 08/15/2022    SED 21 (H) 08/15/2022       Anesthesia Plan    ASA Status:  2   NPO Status:  NPO Appropriate    Anesthesia Type: MAC.     - Reason for MAC: straight local not clinically adequate   Induction: Intravenous, Propofol.   Maintenance: TIVA.        Consents    Anesthesia Plan(s) and associated risks, benefits, and realistic alternatives discussed. Questions answered and patient/representative(s) expressed understanding.    - Discussed:     - Discussed with:  Patient    Use of blood products discussed: No .     Postoperative Care            Comments:    Other Comments: The risks and benefits of anesthesia, and the alternatives where applicable, have been discussed with the patient, and they wish to proceed.            DIANE Hart CRNA

## 2023-05-23 NOTE — DISCHARGE INSTRUCTIONS
[facility]    Home Care Following Endoscopy          Activity:  You have just undergone an endoscopic procedure usually performed with conscious sedation.  Do not work or operate machinery (including a car) for at least 12 hours.    I encourage you to walk and attempt to pass this air as soon as possible.    Diet:  Return to the diet you were on before your procedure but eat lightly for the first 12-24 hours.  Drink plenty of water.  Resume any regular medications unless otherwise advised by your physician.  Please begin any new medication prescribed as a result of your procedure as directed by your physician.   If you had any biopsy or polyp removed please refrain from aspirin or aspirin products for 2 days.  If on Coumadin please restart as instructed by your physician.   Pain:  You may take Tylenol as needed for pain.  Expected Recovery:  You can expect some mild abdominal fullness and/or discomfort due to the air used to inflate your intestinal tract. It is also normal to have a mild sore throat after upper endoscopy.    Call Your Physician if You Have:  After Upper Endoscopy:  Shoulder, back or chest pain.  Difficulty breathing or swallowing.  Vomiting blood.    Any questions or concerns about your recovery, please call [phone] or after hours [phone] ([phone]) Nurse Advice Line.

## 2023-05-23 NOTE — ANESTHESIA POSTPROCEDURE EVALUATION
Patient: Gucci Logan    Procedure: Procedure(s):  Esophagoscopy, gastroscopy, duodenoscopy (EGD), combined       Anesthesia Type:  MAC    Note:  Disposition: Outpatient   Postop Pain Control: Uneventful            Sign Out: Well controlled pain   PONV: No   Neuro/Psych: Uneventful            Sign Out: Acceptable/Baseline neuro status   Airway/Respiratory: Uneventful            Sign Out: Acceptable/Baseline resp. status   CV/Hemodynamics: Uneventful            Sign Out: Acceptable CV status   Other NRE: NONE   DID A NON-ROUTINE EVENT OCCUR? No    Event details/Postop Comments:  Pt was happy with anesthesia care.  No complications.  I will follow up with the pt if needed.           Last vitals:  Vitals Value Taken Time   /67 05/23/23 1042   Temp     Pulse 74 05/23/23 1042   Resp     SpO2 95 % 05/23/23 1049   Vitals shown include unvalidated device data.    Electronically Signed By: DIANE Hart CRNA  May 23, 2023  10:49 AM

## 2023-05-23 NOTE — ANESTHESIA CARE TRANSFER NOTE
Patient: Gucci Logan    Procedure: Procedure(s):  Esophagoscopy, gastroscopy, duodenoscopy (EGD), combined       Diagnosis: Cirrhosis of liver without ascites, unspecified hepatic cirrhosis type (H) [K74.60]  Diagnosis Additional Information: No value filed.    Anesthesia Type:   MAC     Note:    Oropharynx: oropharynx clear of all foreign objects and spontaneously breathing  Level of Consciousness: drowsy  Oxygen Supplementation: face mask    Independent Airway: airway patency satisfactory and stable  Dentition: dentition unchanged  Vital Signs Stable: post-procedure vital signs reviewed and stable  Report to RN Given: handoff report given  Patient transferred to: Phase II    Handoff Report: Identifed the Patient, Identified the Reponsible Provider, Reviewed the pertinent medical history, Discussed the surgical course, Reviewed Intra-OP anesthesia mangement and issues during anesthesia, Set expectations for post-procedure period and Allowed opportunity for questions and acknowledgement of understanding      Vitals:  Vitals Value Taken Time   BP     Temp     Pulse     Resp     SpO2         Electronically Signed By: DIANE Hart CRNA  May 23, 2023  10:40 AM

## 2023-05-25 ENCOUNTER — TELEPHONE (OUTPATIENT)
Dept: NEUROSURGERY | Facility: CLINIC | Age: 73
End: 2023-05-25
Payer: COMMERCIAL

## 2023-05-25 NOTE — TELEPHONE ENCOUNTER
[facility] Call Center    Phone Message    May a detailed message be left on voicemail: yes     Reason for Call: Other: Nurse calling to confirm  the Pt's doctor and to confirm fax number. Nurse stated the fax number that the writer gave her is the one she already has and that she has been waiting for 5 outstanding orders she has faxed over to be faxed back to her. Please call/fax to confirm these orders with Hospice.      Action Taken: Message routed to:  [facility] Neurosurgery    Travel Screening: Not Applicable

## 2023-06-05 DIAGNOSIS — Z98.1 ARTHRODESIS STATUS: ICD-10-CM

## 2023-06-05 DIAGNOSIS — S22.009A THORACIC VERTEBRAL FRACTURE (H): Primary | ICD-10-CM

## 2023-06-05 NOTE — TELEPHONE ENCOUNTER
[facility] Call Center    Phone Message    May a detailed message be left on voicemail: yes     Reason for Call: Other:  Kendra is calling from [facility] stating that she has been faxing forms to us since 4/21 with no response and has called previously with no returned phone call. Please call to discuss if there is problem sending these back to her   Fax [phone]    Action Taken: Message routed to:  [facility] Neurosurgery    Travel Screening: Not Applicable

## 2023-06-06 ENCOUNTER — OFFICE VISIT (OUTPATIENT)
Dept: NEUROSURGERY | Facility: CLINIC | Age: 73
End: 2023-06-06
Payer: COMMERCIAL

## 2023-06-06 ENCOUNTER — ANCILLARY PROCEDURE (OUTPATIENT)
Dept: GENERAL RADIOLOGY | Facility: CLINIC | Age: 73
End: 2023-06-06
Attending: NEUROLOGICAL SURGERY
Payer: COMMERCIAL

## 2023-06-06 VITALS
DIASTOLIC BLOOD PRESSURE: 75 MMHG | OXYGEN SATURATION: 97 % | SYSTOLIC BLOOD PRESSURE: 167 MMHG | BODY MASS INDEX: 35.84 KG/M2 | HEART RATE: 72 BPM | HEIGHT: 71 IN | WEIGHT: 256 LBS

## 2023-06-06 DIAGNOSIS — S22.009A THORACIC VERTEBRAL FRACTURE (H): ICD-10-CM

## 2023-06-06 DIAGNOSIS — Z98.1 ARTHRODESIS STATUS: Primary | ICD-10-CM

## 2023-06-06 DIAGNOSIS — Z98.1 ARTHRODESIS STATUS: ICD-10-CM

## 2023-06-06 PROCEDURE — 99214 OFFICE O/P EST MOD 30 MIN: CPT | Mod: GC | Performed by: NEUROLOGICAL SURGERY

## 2023-06-06 PROCEDURE — 72080 X-RAY EXAM THORACOLMB 2/> VW: CPT | Performed by: STUDENT IN AN ORGANIZED HEALTH CARE EDUCATION/TRAINING PROGRAM

## 2023-06-06 ASSESSMENT — PAIN SCALES - GENERAL: PAINLEVEL: SEVERE PAIN (6)

## 2023-06-06 NOTE — PATIENT INSTRUCTIONS
Your fracture is stable.  You may wean out of the brace  staring by leaving it off at night and slowly increasing the time out of the brace over the next week until out completely out.      Dr Del Rio suggested Physical Therapy but you have elected to wait until your insurance issues are straightened out.  Call [phone] when you are ready to move ahead and we will enter a valid order.      Thank you for choosing [facility] for your care needs.

## 2023-06-06 NOTE — LETTER
"6/6/2023       RE: Gucci Logan  [address] [facility] [address]     Dear Colleague,    Thank you for referring your patient, Gucci Logan, to the [facility] NEUROSURGERY CLINIC [place] [facility]. Please see a copy of my visit note below.    Neurosurgery Clinic Note     Chief Complaint: T6 VB fracture with extension through T5-6 disc space     DATE OF VISIT: 3/23/2023     HPI: Gucci Logan is a pleasant 72 year old male with PMH of chronic right shoulder pain, SHONDA, Hepatitis, splenomegaly, DM2, Vit D def, OA, thoracolumbar fusion, obesity, who presented to ED at [facility] on 2/20/23 after a slip and fall on ice, landing on his back and hitting the back of his head.  He was fit with a TLSO for T6 vertebral body fracture extending into the T5-6 disc space with NO posterior column involvement seen on MRI. He has been wearing the brace inconsistently since the injury and is walking regularly for exercise. He presents today for follow-up with minimal complaint of back pain, mostly around his right shoulder blade which was also injured in the fall. No weakness or numbness. No bowel/bladder complaints.         Physical Exam   BP (!) 167/75 (BP Location: Right arm, Patient Position: Chair, Cuff Size: Adult Regular)   Pulse 72   Ht 1.803 m (5' 11\")   Wt 116.1 kg (256 lb)   SpO2 97%   BMI 35.70 kg/m       Constitutional: Appears well-developed and well-nourished. Cooperative. No acute distress.   HENT:   Head: Normocephalic and atraumatic.   Eyes: Conjunctivae are normal.  Neck: Neck supple. No tracheal deviation present.  Cardiovascular: Normal rate and regular rhythm.    Pulmonary/Chest: Effort normal and breath sounds normal.  Abdominal: Exhibits no obvious distension.   Musculoskeletal: Able to move all extremities.  No involuntary motor movements.   Skin: Skin is warm, dry and intact.   Psychiatric: Normal mood and " "affect. Speech is normal and behavior is normal.     Neurological:  Alert, NAD, and oriented to person, place, and time.   No cranial nerve deficit   Stance/Gait: no longer using walker, wearing TLSO brace   BUE/BLE - no apparent motor deficits     IMAGING:  Personally reviewed      T/L XR 6/6/23: No obvious fracture identified on our review. Good alignment.    CT thoracic 2/20/23 - \"IMPRESSION:  1. Acute transversely oriented fracture through an anterior T5-T6  bridging syndesmophyte and extending through the T5-T6 disc space and  the superior aspect of the T6 vertebral body. This is seen in the  setting of diffuse idiopathic skeletal hyperostosis.  2. Nonspecific vertically oriented lucency along the anterior margin  of the T3 vertebral body, which could represent a prominent endplate  osteophyte. A nondisplaced fracture is difficult to completely  exclude. MRI could be considered for further characterization if it  would change the patient's clinical management.  3. Degenerative changes without evidence for high-grade spinal canal  stenosis in the thoracic region.  4. Partially visualized posterior fusion instrumentation extending  from T11 into the lumbar region.  5. Please see the separate report from CT chest, abdomen and pelvis of  same session for additional details.\"     MRI Thoracic w/o 2/21/23 - \"IMPRESSION:    1. Acute fracture involving the ossified anterior longitudinal  ligament at T5-6 extending into the T5-6 disc space, T6 vertebral  body, and T6-7 disc joint.   2. No other fractures are identified.  3. Mild edema within the right inferior T3 vertebral body is favored  to be degenerative.\"        Vitamin D:  Vitamin D Deficiency Screening Results:        Lab Results   Component Value Date     VITDT 56 02/21/2023     VITDT 39 04/24/2019     VITDT 22 (L) 01/16/2013      ASSESSMENT & PLAN:  Gucci Logan is a 72 year old male s/p slip/fall on ice on 2/20/23 resulting in T6 VB fracture extending " from T5/6 disc space into T6/7 disc space and disruption of the ALL.  He has been managed with TLSO for > 3 months.  XR completed today showing no obvious fracture, good alignment.      - Ok to discontinue TLSO as tolerated  - Follow-up PRN  - Contact information provided; counseled patient to call if he develops worsening back pain or neurologic symptoms    Piedad Rehman MD, PhD  PGY-1 Neurosurgery    Please contact neurosurgery resident on call with questions.    Dial * * [phone], enter [phone] when prompted.   I have seen this patient with the resident and formulated a plan and agree with this note.  AMP

## 2023-06-06 NOTE — PROGRESS NOTES
"Neurosurgery Clinic Note     Chief Complaint: T6 VB fracture with extension through T5-6 disc space     DATE OF VISIT: 3/23/2023     HPI: Gucci Logan is a pleasant 72 year old male with PMH of chronic right shoulder pain, SHONDA, Hepatitis, splenomegaly, DM2, Vit D def, OA, thoracolumbar fusion, obesity, who presented to ED at [facility] on 2/20/23 after a slip and fall on ice, landing on his back and hitting the back of his head.  He was fit with a TLSO for T6 vertebral body fracture extending into the T5-6 disc space with NO posterior column involvement seen on MRI. He has been wearing the brace inconsistently since the injury and is walking regularly for exercise. He presents today for follow-up with minimal complaint of back pain, mostly around his right shoulder blade which was also injured in the fall. No weakness or numbness. No bowel/bladder complaints.         Physical Exam   BP (!) 167/75 (BP Location: Right arm, Patient Position: Chair, Cuff Size: Adult Regular)   Pulse 72   Ht 1.803 m (5' 11\")   Wt 116.1 kg (256 lb)   SpO2 97%   BMI 35.70 kg/m       Constitutional: Appears well-developed and well-nourished. Cooperative. No acute distress.   HENT:   Head: Normocephalic and atraumatic.   Eyes: Conjunctivae are normal.  Neck: Neck supple. No tracheal deviation present.  Cardiovascular: Normal rate and regular rhythm.    Pulmonary/Chest: Effort normal and breath sounds normal.  Abdominal: Exhibits no obvious distension.   Musculoskeletal: Able to move all extremities.  No involuntary motor movements.   Skin: Skin is warm, dry and intact.   Psychiatric: Normal mood and affect. Speech is normal and behavior is normal.     Neurological:  Alert, NAD, and oriented to person, place, and time.   No cranial nerve deficit   Stance/Gait: no longer using walker, wearing TLSO brace   BUE/BLE - no apparent motor deficits     IMAGING:  Personally reviewed      T/L XR 6/6/23: No obvious fracture " "identified on our review. Good alignment.    CT thoracic 2/20/23 - \"IMPRESSION:  1. Acute transversely oriented fracture through an anterior T5-T6  bridging syndesmophyte and extending through the T5-T6 disc space and  the superior aspect of the T6 vertebral body. This is seen in the  setting of diffuse idiopathic skeletal hyperostosis.  2. Nonspecific vertically oriented lucency along the anterior margin  of the T3 vertebral body, which could represent a prominent endplate  osteophyte. A nondisplaced fracture is difficult to completely  exclude. MRI could be considered for further characterization if it  would change the patient's clinical management.  3. Degenerative changes without evidence for high-grade spinal canal  stenosis in the thoracic region.  4. Partially visualized posterior fusion instrumentation extending  from T11 into the lumbar region.  5. Please see the separate report from CT chest, abdomen and pelvis of  same session for additional details.\"     MRI Thoracic w/o 2/21/23 - \"IMPRESSION:    1. Acute fracture involving the ossified anterior longitudinal  ligament at T5-6 extending into the T5-6 disc space, T6 vertebral  body, and T6-7 disc joint.   2. No other fractures are identified.  3. Mild edema within the right inferior T3 vertebral body is favored  to be degenerative.\"        Vitamin D:  Vitamin D Deficiency Screening Results:        Lab Results   Component Value Date     VITDT 56 02/21/2023     VITDT 39 04/24/2019     VITDT 22 (L) 01/16/2013      ASSESSMENT & PLAN:  Gucci Logan is a 72 year old male s/p slip/fall on ice on 2/20/23 resulting in T6 VB fracture extending from T5/6 disc space into T6/7 disc space and disruption of the ALL.  He has been managed with TLSO for > 3 months.  XR completed today showing no obvious fracture, good alignment.      - Ok to discontinue TLSO as tolerated  - Follow-up PRN  - Contact information provided; counseled patient to call if he develops " worsening back pain or neurologic symptoms    Piedad Rehman MD, PhD  PGY-1 Neurosurgery    Please contact neurosurgery resident on call with questions.    Dial * * [phone], enter [phone] when prompted.   I have seen this patient with the resident and formulated a plan and agree with this note.  AMP

## 2023-06-12 ENCOUNTER — TELEPHONE (OUTPATIENT)
Dept: FAMILY MEDICINE | Facility: CLINIC | Age: 73
End: 2023-06-12
Payer: COMMERCIAL

## 2023-06-12 DIAGNOSIS — D64.9 ANEMIA, UNSPECIFIED TYPE: ICD-10-CM

## 2023-06-12 DIAGNOSIS — E11.9 DIABETES MELLITUS WITHOUT COMPLICATION (H): ICD-10-CM

## 2023-06-12 DIAGNOSIS — E03.9 HYPOTHYROIDISM, UNSPECIFIED TYPE: Primary | ICD-10-CM

## 2023-06-12 NOTE — TELEPHONE ENCOUNTER
Patient requesting lab orders for A1C to be completed at lab appointment tomorrow 6/13/23 at 1000. Has Diabetic follow-up appointment with PCP 6/19/23.    Routing to PCP to please place lab orders.    ABI Szymanski RN  [facility]

## 2023-06-12 NOTE — TELEPHONE ENCOUNTER
Order/Referral Request    Who is requesting: Patient    Orders being requested: A1C    Reason service is needed/diagnosis: maintenance     When are orders needed by: ASAP    Has this been discussed with Provider: Yes    Does patient have a preference on a Group/Provider/Facility? [facility]    Does patient have an appointment scheduled?: YES 6/13/23    Where to send orders: Place orders within Epic    Okay to leave a detailed message?: Yes at Cell number on file:    Telephone Information:   Mobile [phone]

## 2023-06-13 ENCOUNTER — LAB (OUTPATIENT)
Dept: LAB | Facility: CLINIC | Age: 73
End: 2023-06-13
Payer: COMMERCIAL

## 2023-06-13 DIAGNOSIS — D64.9 ANEMIA, UNSPECIFIED TYPE: ICD-10-CM

## 2023-06-13 DIAGNOSIS — E11.9 DIABETES MELLITUS WITHOUT COMPLICATION (H): ICD-10-CM

## 2023-06-13 DIAGNOSIS — E03.9 HYPOTHYROIDISM, UNSPECIFIED TYPE: ICD-10-CM

## 2023-06-13 LAB
ALBUMIN SERPL BCG-MCNC: 3.9 G/DL (ref 3.5–5.2)
ALP SERPL-CCNC: 113 U/L (ref 40–129)
ALT SERPL W P-5'-P-CCNC: 73 U/L (ref 0–70)
ANION GAP SERPL CALCULATED.3IONS-SCNC: 11 MMOL/L (ref 7–15)
AST SERPL W P-5'-P-CCNC: 61 U/L (ref 0–45)
BASOPHILS # BLD AUTO: 0 10E3/UL (ref 0–0.2)
BASOPHILS NFR BLD AUTO: 0 %
BILIRUB SERPL-MCNC: 1.2 MG/DL
BUN SERPL-MCNC: 25.4 MG/DL (ref 8–23)
CALCIUM SERPL-MCNC: 9.3 MG/DL (ref 8.8–10.2)
CHLORIDE SERPL-SCNC: 105 MMOL/L (ref 98–107)
CREAT SERPL-MCNC: 1.34 MG/DL (ref 0.67–1.17)
DEPRECATED HCO3 PLAS-SCNC: 25 MMOL/L (ref 22–29)
EOSINOPHIL # BLD AUTO: 0.1 10E3/UL (ref 0–0.7)
EOSINOPHIL NFR BLD AUTO: 1 %
ERYTHROCYTE [DISTWIDTH] IN BLOOD BY AUTOMATED COUNT: 14.2 % (ref 10–15)
FOLATE SERPL-MCNC: 19 NG/ML (ref 4.6–34.8)
GFR SERPL CREATININE-BSD FRML MDRD: 56 ML/MIN/1.73M2
GLUCOSE SERPL-MCNC: 132 MG/DL (ref 70–99)
HBA1C MFR BLD: 6.2 % (ref 0–5.6)
HCT VFR BLD AUTO: 36.8 % (ref 40–53)
HGB BLD-MCNC: 12 G/DL (ref 13.3–17.7)
IMM GRANULOCYTES # BLD: 0 10E3/UL
IMM GRANULOCYTES NFR BLD: 0 %
LYMPHOCYTES # BLD AUTO: 0.6 10E3/UL (ref 0.8–5.3)
LYMPHOCYTES NFR BLD AUTO: 13 %
MCH RBC QN AUTO: 33.4 PG (ref 26.5–33)
MCHC RBC AUTO-ENTMCNC: 32.6 G/DL (ref 31.5–36.5)
MCV RBC AUTO: 103 FL (ref 78–100)
MONOCYTES # BLD AUTO: 0.4 10E3/UL (ref 0–1.3)
MONOCYTES NFR BLD AUTO: 9 %
NEUTROPHILS # BLD AUTO: 3.3 10E3/UL (ref 1.6–8.3)
NEUTROPHILS NFR BLD AUTO: 77 %
NRBC # BLD AUTO: 0 10E3/UL
NRBC BLD AUTO-RTO: 0 /100
PLATELET # BLD AUTO: 57 10E3/UL (ref 150–450)
POTASSIUM SERPL-SCNC: 4.4 MMOL/L (ref 3.4–5.3)
PROT SERPL-MCNC: 6.7 G/DL (ref 6.4–8.3)
RBC # BLD AUTO: 3.59 10E6/UL (ref 4.4–5.9)
SODIUM SERPL-SCNC: 141 MMOL/L (ref 136–145)
T4 FREE SERPL-MCNC: 1.2 NG/DL (ref 0.9–1.7)
TSH SERPL DL<=0.005 MIU/L-ACNC: 4.13 UIU/ML (ref 0.3–4.2)
VIT B12 SERPL-MCNC: 486 PG/ML (ref 232–1245)
WBC # BLD AUTO: 4.4 10E3/UL (ref 4–11)

## 2023-06-13 PROCEDURE — 36415 COLL VENOUS BLD VENIPUNCTURE: CPT

## 2023-06-13 PROCEDURE — 84443 ASSAY THYROID STIM HORMONE: CPT

## 2023-06-13 PROCEDURE — 80053 COMPREHEN METABOLIC PANEL: CPT

## 2023-06-13 PROCEDURE — 82746 ASSAY OF FOLIC ACID SERUM: CPT

## 2023-06-13 PROCEDURE — 83036 HEMOGLOBIN GLYCOSYLATED A1C: CPT

## 2023-06-13 PROCEDURE — 82607 VITAMIN B-12: CPT

## 2023-06-13 PROCEDURE — 84439 ASSAY OF FREE THYROXINE: CPT

## 2023-06-13 PROCEDURE — 85025 COMPLETE CBC W/AUTO DIFF WBC: CPT

## 2023-06-14 NOTE — TELEPHONE ENCOUNTER
[facility] Call Center    Phone Message    May a detailed message be left on voicemail: yes     Reason for Call: Other: Kendra is calling because they have not received the signed forms that are needing to be faxed over. Writer informed Kendra that [employer] faxed over the signed forms on 6/5 but they did not received them. Kendra provided a new fax number at [phone]     Action Taken: Message routed to:  [facility] Neurosurgery    Travel Screening: Not Applicable

## 2023-06-19 ENCOUNTER — OFFICE VISIT (OUTPATIENT)
Dept: FAMILY MEDICINE | Facility: CLINIC | Age: 73
End: 2023-06-19
Payer: COMMERCIAL

## 2023-06-19 VITALS
SYSTOLIC BLOOD PRESSURE: 118 MMHG | HEART RATE: 76 BPM | DIASTOLIC BLOOD PRESSURE: 67 MMHG | BODY MASS INDEX: 36.19 KG/M2 | OXYGEN SATURATION: 97 % | WEIGHT: 258.5 LBS | RESPIRATION RATE: 16 BRPM | HEIGHT: 71 IN | TEMPERATURE: 98.1 F

## 2023-06-19 DIAGNOSIS — E11.43 TYPE 2 DIABETES MELLITUS WITH DIABETIC AUTONOMIC NEUROPATHY, WITHOUT LONG-TERM CURRENT USE OF INSULIN (H): Primary | ICD-10-CM

## 2023-06-19 DIAGNOSIS — S22.000A COMPRESSION FRACTURE OF THORACIC VERTEBRA, UNSPECIFIED THORACIC VERTEBRAL LEVEL, INITIAL ENCOUNTER (H): ICD-10-CM

## 2023-06-19 DIAGNOSIS — E08.49 OTHER DIABETIC NEUROLOGICAL COMPLICATION ASSOCIATED WITH DIABETES MELLITUS DUE TO UNDERLYING CONDITION (H): ICD-10-CM

## 2023-06-19 DIAGNOSIS — E78.5 HYPERLIPIDEMIA LDL GOAL <100: ICD-10-CM

## 2023-06-19 DIAGNOSIS — K21.9 GASTROESOPHAGEAL REFLUX DISEASE WITHOUT ESOPHAGITIS: ICD-10-CM

## 2023-06-19 DIAGNOSIS — I10 HYPERTENSION GOAL BP (BLOOD PRESSURE) < 140/90: ICD-10-CM

## 2023-06-19 DIAGNOSIS — E11.42 TYPE 2 DIABETES MELLITUS WITH DIABETIC POLYNEUROPATHY, WITHOUT LONG-TERM CURRENT USE OF INSULIN (H): ICD-10-CM

## 2023-06-19 DIAGNOSIS — N40.1 BENIGN PROSTATIC HYPERPLASIA WITH LOWER URINARY TRACT SYMPTOMS, SYMPTOM DETAILS UNSPECIFIED: ICD-10-CM

## 2023-06-19 DIAGNOSIS — E11.9 NON-INSULIN DEPENDENT TYPE 2 DIABETES MELLITUS (H): ICD-10-CM

## 2023-06-19 DIAGNOSIS — E03.9 HYPOTHYROIDISM, UNSPECIFIED TYPE: ICD-10-CM

## 2023-06-19 PROCEDURE — 99207 PR FOOT EXAM NO CHARGE: CPT | Performed by: FAMILY MEDICINE

## 2023-06-19 PROCEDURE — 99214 OFFICE O/P EST MOD 30 MIN: CPT | Performed by: FAMILY MEDICINE

## 2023-06-19 RX ORDER — SEMAGLUTIDE 0.68 MG/ML
0.5 INJECTION, SOLUTION SUBCUTANEOUS
Qty: 3 ML | Refills: 1 | Status: SHIPPED | OUTPATIENT
Start: 2023-06-19 | End: 2023-08-09

## 2023-06-19 RX ORDER — LEVOTHYROXINE SODIUM 25 UG/1
25 TABLET ORAL DAILY
Qty: 90 TABLET | Refills: 1 | Status: SHIPPED | OUTPATIENT
Start: 2023-06-19 | End: 2023-12-20

## 2023-06-19 RX ORDER — TAMSULOSIN HYDROCHLORIDE 0.4 MG/1
0.4 CAPSULE ORAL DAILY
Qty: 90 CAPSULE | Refills: 1 | Status: SHIPPED | OUTPATIENT
Start: 2023-06-19 | End: 2023-08-29

## 2023-06-19 RX ORDER — METOPROLOL SUCCINATE 100 MG/1
100 TABLET, EXTENDED RELEASE ORAL DAILY
Qty: 90 TABLET | Refills: 1 | Status: SHIPPED | OUTPATIENT
Start: 2023-06-19 | End: 2023-12-20

## 2023-06-19 RX ORDER — ATORVASTATIN CALCIUM 10 MG/1
10 TABLET, FILM COATED ORAL DAILY
Qty: 90 TABLET | Refills: 1 | Status: SHIPPED | OUTPATIENT
Start: 2023-06-19 | End: 2023-12-20

## 2023-06-19 RX ORDER — HYDROCHLOROTHIAZIDE 50 MG/1
50 TABLET ORAL DAILY
Qty: 90 TABLET | Refills: 1 | Status: SHIPPED | OUTPATIENT
Start: 2023-06-19 | End: 2023-12-20

## 2023-06-19 RX ORDER — VALSARTAN 160 MG/1
160 TABLET ORAL DAILY
Qty: 90 TABLET | Refills: 1 | Status: SHIPPED | OUTPATIENT
Start: 2023-06-19 | End: 2023-12-20

## 2023-06-19 RX ORDER — SPIRONOLACTONE 25 MG/1
25 TABLET ORAL DAILY
Qty: 90 TABLET | Refills: 1 | Status: SHIPPED | OUTPATIENT
Start: 2023-06-19 | End: 2023-12-20

## 2023-06-19 RX ORDER — TRAMADOL HYDROCHLORIDE 50 MG/1
50 TABLET ORAL EVERY 6 HOURS PRN
Qty: 30 TABLET | Refills: 0 | Status: SHIPPED | OUTPATIENT
Start: 2023-06-19 | End: 2023-07-11

## 2023-06-19 ASSESSMENT — PAIN SCALES - GENERAL: PAINLEVEL: NO PAIN (0)

## 2023-06-19 NOTE — PROGRESS NOTES
"       Lakhwinder Lemos is a 73 year old, presenting for the following health issues:  Diabetes        6/19/2023    10:54 AM   Additional Questions   Roomed by Sofia Conde     HPI     Diabetes Follow-up      How often are you checking your blood sugar? Not at all    What concerns do you have today about your diabetes? None     Do you have any of these symptoms? (Select all that apply)  Numbness in feet, Burning in feet and Redness, sores, or blisters on feet    Have you had a diabetic eye exam in the last 12 months? Yes- Date of last eye exam: 03/08/2023,  Location: [facility]        BP Readings from Last 2 Encounters:   06/19/23 118/67   06/06/23 (!) 167/75     Hemoglobin A1C (%)   Date Value   06/13/2023 6.2 (H)   04/07/2023 6.6 (H)   04/05/2021 9.7 (H)   01/12/2021 7.5 (H)     LDL Cholesterol Calculated (mg/dL)   Date Value   01/18/2023 54   08/15/2022 77   01/12/2021 86   04/20/2020 95             How many servings of fruits and vegetables do you eat daily?  0-1    On average, how many sweetened beverages do you drink each day (Examples: soda, juice, sweet tea, etc.  Do NOT count diet or artificially sweetened beverages)?   0    How many days per week do you exercise enough to make your heart beat faster?     How many minutes a day do you exercise enough to make your heart beat faster?     How many days per week do you miss taking your medication? 0           Review of Systems   Patient [facility] pair [facility]    These shoes help quite a bit    This visit is mainly for diabetes    Back is painful    Followed up with back specialist in regards to compression fractures    Did not do stress test          Objective    /67 (BP Location: Left arm, Patient Position: Chair, Cuff Size: Adult Regular)   Pulse 76   Temp 98.1  F (36.7  C) (Temporal)   Resp 16   Ht 1.803 m (5' 11\")   Wt 117.3 kg (258 lb 8 oz)   SpO2 97%   BMI 36.05 kg/m    Body mass " index is 36.05 kg/m .  Physical Exam  Constitutional:       Appearance: He is well-developed.   HENT:      Head: Normocephalic and atraumatic.   Eyes:      Conjunctiva/sclera: Conjunctivae normal.   Neck:      Vascular: No carotid bruit.   Cardiovascular:      Rate and Rhythm: Normal rate and regular rhythm.      Heart sounds: Normal heart sounds.   Pulmonary:      Effort: Pulmonary effort is normal. No respiratory distress.      Breath sounds: Normal breath sounds.   Neurological:      Mental Status: He is alert and oriented to person, place, and time.      Cranial Nerves: No cranial nerve deficit.   Psychiatric:         Speech: Speech normal.         Behavior: Behavior normal.         foot exam done, decreased sensation to light touch and vibration distally  Mild calluses  Faint dorsalis pedis pulses  No skin ulcers on feet          ASSESSMENT / PLAN:  (E11.43) Type 2 diabetes mellitus with diabetic autonomic neuropathy, without long-term current use of insulin (H)  (primary encounter diagnosis)  Comment: this is primary reason for visit today. Patient does have type 2 diabetes, not needing insulin.  His last hemoglobin a1c was very good at 6.2.  The semaglutide has been very helpful  Continue this, along with metformin.  Same doses.    Plan: metFORMIN (GLUCOPHAGE) 500 MG tablet        Patient needs new diabetic shoes with custom inserts due to his neuropathy and callus formation.   See us in 3 months for recheck. Keep working on healthy diet/exercise and wt loss    (E08.49) 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)  Comment:  As jesika   Plan: Adult Eye  Referral        See eye doctor     (E11.42) Type 2 diabetes mellitus with diabetic polyneuropathy, without long-term current use of insulin (H)  Comment: as above   Plan: FOOT EXAM             (E78.5) Hyperlipidemia LDL goal <100  Comment: refill med   Plan: atorvastatin (LIPITOR) 10 MG  tablet             (I10) Hypertension goal BP (blood pressure) < 140/90  Comment: blood pressure okay here; refill meds   Plan: hydrochlorothiazide (HYDRODIURIL) 50 MG tablet,        metoprolol succinate ER (TOPROL XL) 100 MG 24         hr tablet, spironolactone (ALDACTONE) 25 MG         tablet, valsartan (DIOVAN) 160 MG tablet             (E03.9) Hypothyroidism, unspecified type  Comment: refill   Plan: levothyroxine (SYNTHROID/LEVOTHROID) 25 MCG         tablet             (K21.9) Gastroesophageal reflux disease without esophagitis  Comment: stable, refill med   Plan: omeprazole (PRILOSEC) 20 MG DR capsule            (E11.9) Non-insulin dependent type 2 diabetes mellitus (H)  Comment:  Needs refill  Plan: semaglutide (OZEMPIC, 0.25 OR 0.5 MG/DOSE,) 2         MG/3ML pen              (N40.1) Benign prostatic hyperplasia with lower urinary tract symptoms, symptom details unspecified  Comment: stable on med, refill   Plan: tamsulosin (FLOMAX) 0.4 MG capsule             (S22.000A) Compression fracture of thoracic vertebra, unspecified thoracic vertebral level, initial encounter (H)  Comment: of note patient not needing brace anymore.  No imaging needed as long as symptoms improve.   Plan: traMADol (ULTRAM) 50 MG tablet             Will fax copy of note to Kellen.       I reviewed the patient's medications, allergies, medical history, family history, and social history.    Richi Jaramillo MD

## 2023-06-19 NOTE — PATIENT INSTRUCTIONS
Increase walking/ exercise as able    Calcium and vitamin D    We will fax [facility] copy of chart note    Stay on same meds    See us in 2-3 months

## 2023-06-19 NOTE — TELEPHONE ENCOUNTER
Kendra called to let us know that she still has not gotten forms faxed back to her.  She confirmed that the fax # is correct.  Please fax to [phone].  Call Kendra with any questions at [phone].  Thanks.

## 2023-06-20 LAB — GLUCOSE BLDC GLUCOMTR-MCNC: 143 MG/DL (ref 70–99)

## 2023-06-21 ENCOUNTER — TELEPHONE (OUTPATIENT)
Dept: PHARMACY | Facility: CLINIC | Age: 73
End: 2023-06-21
Payer: COMMERCIAL

## 2023-06-21 NOTE — TELEPHONE ENCOUNTER
MTM referral from: Patient's insurance      MTM referral outreach attempt #2 on June 21, 2023 at 2:00pm      Outcome: pt answered and responded not interested in scheduling, then immediately hung up    Yeni Reyes PharmD  Medication Therapy Management Pharmacist  [facility]

## 2023-06-26 NOTE — TELEPHONE ENCOUNTER
"Kendra called again.  She is frustrated that no one has responded to her.  She said that this is causing them on getting \"timed out to bill\".  She is requesting that forms be faxed to her at [phone].  Call with any questions at [phone].  "

## 2023-06-27 NOTE — TELEPHONE ENCOUNTER
Writer faxed attn: Kendra Bañuelos to fax she provided;   Fax: [phone] and Fax: given on [employer] mail Fax: [phone]. Forms scanned to patient chart by .    Mily Linder LPN  Neurosurgery

## 2023-07-07 ENCOUNTER — MEDICAL CORRESPONDENCE (OUTPATIENT)
Dept: HEALTH INFORMATION MANAGEMENT | Facility: CLINIC | Age: 73
End: 2023-07-07
Payer: COMMERCIAL

## 2023-07-07 ENCOUNTER — CARE COORDINATION (OUTPATIENT)
Dept: NEUROSURGERY | Facility: CLINIC | Age: 73
End: 2023-07-07
Payer: COMMERCIAL

## 2023-07-07 NOTE — TELEPHONE ENCOUNTER
[facility] Call Center    Phone Message    May a detailed message be left on voicemail: yes     Reason for Call: Other: Kendra with [facility] called stating she is very frustrated she has not received any orders back from clinic or heard from anyone regarding patient. She states she has been given excuses and nothing ends up being completed. She states she has been sending orders as old as April of this year and she is at a loss for what to do. She is wondering if she needs to drive to the clinic to drop the orders of personally. Please advise.     Action Taken: Message routed to:  [facility] Neurosurgery    Travel Screening: Not Applicable

## 2023-07-07 NOTE — PROGRESS NOTES
Writer left voice mail for Kendra Sarmad to let her know the forms were faxed multiple times. Writer re faxed forms and scanned to patient chart.     Mily Linder LPN  Neurosurgery

## 2023-07-21 ENCOUNTER — MEDICAL CORRESPONDENCE (OUTPATIENT)
Dept: HEALTH INFORMATION MANAGEMENT | Facility: CLINIC | Age: 73
End: 2023-07-21
Payer: COMMERCIAL

## 2023-08-04 ENCOUNTER — TELEPHONE (OUTPATIENT)
Dept: FAMILY MEDICINE | Facility: CLINIC | Age: 73
End: 2023-08-04
Payer: COMMERCIAL

## 2023-08-04 DIAGNOSIS — J01.90 ACUTE SINUSITIS WITH SYMPTOMS > 10 DAYS: ICD-10-CM

## 2023-08-04 NOTE — TELEPHONE ENCOUNTER
Patient has been receiving amox prescriptions from Dr. Jaramillo since 2018 for sinus infections. Looks like chronic use. Should wait for PCP for advise.     Thanks,  CASANDRA Hess  [facility]

## 2023-08-07 NOTE — TELEPHONE ENCOUNTER
Not able to leave a message due to mailbox not being set up. If patient calls back please inform him of providers message below     Sanjeev-

## 2023-08-09 ENCOUNTER — VIRTUAL VISIT (OUTPATIENT)
Dept: FAMILY MEDICINE | Facility: CLINIC | Age: 73
End: 2023-08-09
Payer: COMMERCIAL

## 2023-08-09 DIAGNOSIS — J40 BRONCHITIS: ICD-10-CM

## 2023-08-09 DIAGNOSIS — N40.1 BENIGN PROSTATIC HYPERPLASIA WITH LOWER URINARY TRACT SYMPTOMS, SYMPTOM DETAILS UNSPECIFIED: ICD-10-CM

## 2023-08-09 DIAGNOSIS — M79.10 MUSCLE PAIN: Primary | ICD-10-CM

## 2023-08-09 DIAGNOSIS — Z87.81 HISTORY OF VERTEBRAL COMPRESSION FRACTURE: ICD-10-CM

## 2023-08-09 DIAGNOSIS — E11.9 NON-INSULIN DEPENDENT TYPE 2 DIABETES MELLITUS (H): ICD-10-CM

## 2023-08-09 PROCEDURE — 99442 PR PHYSICIAN TELEPHONE EVALUATION 11-20 MIN: CPT | Mod: 95 | Performed by: FAMILY MEDICINE

## 2023-08-09 RX ORDER — SEMAGLUTIDE 0.68 MG/ML
0.5 INJECTION, SOLUTION SUBCUTANEOUS
Qty: 3 ML | Refills: 1 | Status: SHIPPED | OUTPATIENT
Start: 2023-08-09 | End: 2023-08-29

## 2023-08-09 NOTE — PROGRESS NOTES
Canelo is a 73 year old who is being evaluated via a billable telephone visit.      What phone number would you like to be contacted at? [phone]   How would you like to obtain your AVS? Mail a copy    Distant Location (provider location):  On-site         Subjective   Canelo is a 73 year old, presenting for the following health issues:  RECHECK      8/9/2023    11:03 AM   Additional Questions   Roomed by aaron mercedes         8/9/2023    11:03 AM   Patient Reported Additional Medications   Patient reports taking the following new medications prednisone 4mg       HPI     Results from [facility]              Review of Systems     Had foot brace to help with lack of dorsiflexion    Ends up stubbing toes when not wearing that    Aggrevates it sometimes    Went to [facility]    Walking quite a bit    Brace does work well    Apparently the other clinic wondered about heart and lungs    On medrol dose pack    Gets productive cough with phlegm    Overall back getting worse    Right upper back    No regular shooting pain down arm or leg    Pains move around    Pain still at mid thoracic area where he had compression fracture          Objective           Vitals:  No vitals were obtained today due to virtual visit.    Physical Exam   healthy, alert, and no distress  PSYCH: Alert and oriented times 3; coherent speech, normal   rate and volume, able to articulate logical thoughts, able   to abstract reason, no tangential thoughts, no hallucinations   or delusions  His affect is normal  RESP: No cough, no audible wheezing, able to talk in full sentences  Remainder of exam unable to be completed due to telephone visits       ASSESSMENT / PLAN:  (M79.10) Muscle pain  (primary encounter diagnosis)  Comment: trial of different muscle relaxer, discussed in detail.  Warned of sedation   Plan: tiZANidine (ZANAFLEX) 4 MG tablet             (E11.9) Non-insulin dependent type 2 diabetes mellitus (H)  Comment: refill this; last hemoglobin a1c fine   Plan:  semaglutide (OZEMPIC, 0.25 OR 0.5 MG/DOSE,) 2         MG/3ML pen             (N40.1) Benign prostatic hyperplasia with lower urinary tract symptoms, symptom details unspecified  Comment: discussed in detail.  Could switch tamsulosin to evening and if not better then go to 2 pills daily  Plan: as above     (J40) Bronchitis  Comment: patient on antibiotics and medrol dose pack   Plan: be seen promptly if symptoms acutely worsen.        I did advise patient see us soon in clinic     (Z87.81) History of vertebral compression fracture  Comment: discussed   Plan: increase walking/ activity as able       I reviewed the patient's medications, allergies, medical history, family history, and social history.    Richi Jaramillo MD            Phone call duration: 18 minutes    Richi Jaramillo MD

## 2023-08-29 ENCOUNTER — ANCILLARY PROCEDURE (OUTPATIENT)
Dept: GENERAL RADIOLOGY | Facility: CLINIC | Age: 73
End: 2023-08-29
Attending: FAMILY MEDICINE
Payer: COMMERCIAL

## 2023-08-29 ENCOUNTER — OFFICE VISIT (OUTPATIENT)
Dept: FAMILY MEDICINE | Facility: CLINIC | Age: 73
End: 2023-08-29
Payer: COMMERCIAL

## 2023-08-29 VITALS
SYSTOLIC BLOOD PRESSURE: 134 MMHG | DIASTOLIC BLOOD PRESSURE: 63 MMHG | WEIGHT: 253 LBS | OXYGEN SATURATION: 98 % | BODY MASS INDEX: 35.29 KG/M2 | TEMPERATURE: 98.2 F | HEART RATE: 71 BPM

## 2023-08-29 DIAGNOSIS — D64.9 ANEMIA, UNSPECIFIED TYPE: ICD-10-CM

## 2023-08-29 DIAGNOSIS — E11.9 NON-INSULIN DEPENDENT TYPE 2 DIABETES MELLITUS (H): ICD-10-CM

## 2023-08-29 DIAGNOSIS — R05.9 COUGH, UNSPECIFIED TYPE: ICD-10-CM

## 2023-08-29 DIAGNOSIS — E08.49 OTHER DIABETIC NEUROLOGICAL COMPLICATION ASSOCIATED WITH DIABETES MELLITUS DUE TO UNDERLYING CONDITION (H): Primary | ICD-10-CM

## 2023-08-29 DIAGNOSIS — S22.050D COMPRESSION FRACTURE OF T5 VERTEBRA WITH ROUTINE HEALING, SUBSEQUENT ENCOUNTER: ICD-10-CM

## 2023-08-29 DIAGNOSIS — M79.2 NERVE PAIN: ICD-10-CM

## 2023-08-29 DIAGNOSIS — E66.9 OBESITY, UNSPECIFIED CLASSIFICATION, UNSPECIFIED OBESITY TYPE, UNSPECIFIED WHETHER SERIOUS COMORBIDITY PRESENT: ICD-10-CM

## 2023-08-29 DIAGNOSIS — N40.1 BENIGN PROSTATIC HYPERPLASIA WITH LOWER URINARY TRACT SYMPTOMS, SYMPTOM DETAILS UNSPECIFIED: ICD-10-CM

## 2023-08-29 DIAGNOSIS — E78.5 HYPERLIPIDEMIA LDL GOAL <100: ICD-10-CM

## 2023-08-29 DIAGNOSIS — E11.9 DIABETES MELLITUS WITHOUT COMPLICATION (H): ICD-10-CM

## 2023-08-29 DIAGNOSIS — Z12.5 SCREENING FOR PROSTATE CANCER: ICD-10-CM

## 2023-08-29 DIAGNOSIS — J20.9 ACUTE BRONCHITIS WITH SYMPTOMS > 10 DAYS: ICD-10-CM

## 2023-08-29 DIAGNOSIS — R06.09 DYSPNEA ON EXERTION: ICD-10-CM

## 2023-08-29 LAB
ALBUMIN SERPL BCG-MCNC: 4 G/DL (ref 3.5–5.2)
ALP SERPL-CCNC: 115 U/L (ref 40–129)
ALT SERPL W P-5'-P-CCNC: 55 U/L (ref 0–70)
ANION GAP SERPL CALCULATED.3IONS-SCNC: 11 MMOL/L (ref 7–15)
AST SERPL W P-5'-P-CCNC: 56 U/L (ref 0–45)
BASOPHILS # BLD AUTO: 0 10E3/UL (ref 0–0.2)
BASOPHILS NFR BLD AUTO: 0 %
BILIRUB SERPL-MCNC: 1.4 MG/DL
BUN SERPL-MCNC: 18.8 MG/DL (ref 8–23)
CALCIUM SERPL-MCNC: 9.2 MG/DL (ref 8.8–10.2)
CHLORIDE SERPL-SCNC: 107 MMOL/L (ref 98–107)
CHOLEST SERPL-MCNC: 137 MG/DL
CREAT SERPL-MCNC: 1.28 MG/DL (ref 0.67–1.17)
CREAT UR-MCNC: 126 MG/DL
CREAT UR-MCNC: 126 MG/DL
DEPRECATED HCO3 PLAS-SCNC: 23 MMOL/L (ref 22–29)
EOSINOPHIL # BLD AUTO: 0.1 10E3/UL (ref 0–0.7)
EOSINOPHIL NFR BLD AUTO: 2 %
ERYTHROCYTE [DISTWIDTH] IN BLOOD BY AUTOMATED COUNT: 12.7 % (ref 10–15)
FERRITIN SERPL-MCNC: 214 NG/ML (ref 31–409)
GFR SERPL CREATININE-BSD FRML MDRD: 59 ML/MIN/1.73M2
GLUCOSE SERPL-MCNC: 112 MG/DL (ref 70–99)
HBA1C MFR BLD: 5.9 % (ref 0–5.6)
HCT VFR BLD AUTO: 36.4 % (ref 40–53)
HDLC SERPL-MCNC: 47 MG/DL
HGB BLD-MCNC: 12.3 G/DL (ref 13.3–17.7)
IMM GRANULOCYTES # BLD: 0 10E3/UL
IMM GRANULOCYTES NFR BLD: 0 %
IRON BINDING CAPACITY (ROCHE): 319 UG/DL (ref 240–430)
IRON SATN MFR SERPL: 30 % (ref 15–46)
IRON SERPL-MCNC: 97 UG/DL (ref 61–157)
LDLC SERPL CALC-MCNC: 66 MG/DL
LYMPHOCYTES # BLD AUTO: 0.7 10E3/UL (ref 0.8–5.3)
LYMPHOCYTES NFR BLD AUTO: 19 %
MCH RBC QN AUTO: 33.7 PG (ref 26.5–33)
MCHC RBC AUTO-ENTMCNC: 33.8 G/DL (ref 31.5–36.5)
MCV RBC AUTO: 100 FL (ref 78–100)
MICROALBUMIN UR-MCNC: 38.8 MG/L
MICROALBUMIN/CREAT UR: 30.79 MG/G CR (ref 0–17)
MONOCYTES # BLD AUTO: 0.3 10E3/UL (ref 0–1.3)
MONOCYTES NFR BLD AUTO: 8 %
NEUTROPHILS # BLD AUTO: 2.7 10E3/UL (ref 1.6–8.3)
NEUTROPHILS NFR BLD AUTO: 71 %
NONHDLC SERPL-MCNC: 90 MG/DL
NRBC # BLD AUTO: 0 10E3/UL
NRBC BLD AUTO-RTO: 0 /100
PLATELET # BLD AUTO: 51 10E3/UL (ref 150–450)
POTASSIUM SERPL-SCNC: 4.5 MMOL/L (ref 3.4–5.3)
PROT SERPL-MCNC: 6.8 G/DL (ref 6.4–8.3)
PSA SERPL DL<=0.01 NG/ML-MCNC: 1.49 NG/ML (ref 0–6.5)
RBC # BLD AUTO: 3.65 10E6/UL (ref 4.4–5.9)
SODIUM SERPL-SCNC: 141 MMOL/L (ref 136–145)
TRIGL SERPL-MCNC: 118 MG/DL
WBC # BLD AUTO: 3.8 10E3/UL (ref 4–11)

## 2023-08-29 PROCEDURE — 82043 UR ALBUMIN QUANTITATIVE: CPT | Performed by: FAMILY MEDICINE

## 2023-08-29 PROCEDURE — 83540 ASSAY OF IRON: CPT | Performed by: FAMILY MEDICINE

## 2023-08-29 PROCEDURE — 85025 COMPLETE CBC W/AUTO DIFF WBC: CPT | Performed by: FAMILY MEDICINE

## 2023-08-29 PROCEDURE — G0103 PSA SCREENING: HCPCS | Performed by: FAMILY MEDICINE

## 2023-08-29 PROCEDURE — 80061 LIPID PANEL: CPT | Performed by: FAMILY MEDICINE

## 2023-08-29 PROCEDURE — 83550 IRON BINDING TEST: CPT | Performed by: FAMILY MEDICINE

## 2023-08-29 PROCEDURE — 36415 COLL VENOUS BLD VENIPUNCTURE: CPT | Performed by: FAMILY MEDICINE

## 2023-08-29 PROCEDURE — 82728 ASSAY OF FERRITIN: CPT | Performed by: FAMILY MEDICINE

## 2023-08-29 PROCEDURE — 83036 HEMOGLOBIN GLYCOSYLATED A1C: CPT | Performed by: FAMILY MEDICINE

## 2023-08-29 PROCEDURE — 80053 COMPREHEN METABOLIC PANEL: CPT | Performed by: FAMILY MEDICINE

## 2023-08-29 PROCEDURE — 82570 ASSAY OF URINE CREATININE: CPT | Mod: 59 | Performed by: FAMILY MEDICINE

## 2023-08-29 PROCEDURE — 71046 X-RAY EXAM CHEST 2 VIEWS: CPT | Mod: TC | Performed by: RADIOLOGY

## 2023-08-29 PROCEDURE — G0480 DRUG TEST DEF 1-7 CLASSES: HCPCS | Performed by: FAMILY MEDICINE

## 2023-08-29 PROCEDURE — 99214 OFFICE O/P EST MOD 30 MIN: CPT | Performed by: FAMILY MEDICINE

## 2023-08-29 RX ORDER — PREGABALIN 75 MG/1
75 CAPSULE ORAL 3 TIMES DAILY PRN
Qty: 90 CAPSULE | Refills: 1 | Status: SHIPPED | OUTPATIENT
Start: 2023-08-29 | End: 2023-12-20

## 2023-08-29 RX ORDER — TAMSULOSIN HYDROCHLORIDE 0.4 MG/1
0.8 CAPSULE ORAL DAILY
Qty: 180 CAPSULE | Refills: 1 | Status: SHIPPED | OUTPATIENT
Start: 2023-08-29 | End: 2023-12-20

## 2023-08-29 RX ORDER — SEMAGLUTIDE 0.68 MG/ML
0.5 INJECTION, SOLUTION SUBCUTANEOUS
Qty: 3 ML | Refills: 1 | Status: SHIPPED | OUTPATIENT
Start: 2023-08-29 | End: 2023-11-01

## 2023-08-29 NOTE — PATIENT INSTRUCTIONS
Increase tamsulosin to two pills daily    Inrease pregabalin to 75 mg up to 3x daily as needed    Schedule heart stress test     We will send you lab results
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
## 2023-08-29 NOTE — LETTER
August 31, 2023    Canelo Logan  [address] [facility] [address]          Dear ,    We are writing to inform you of your test results.  Kidney test ( creatinine ) is a bit better than last time.     Liver tests mostly normal; only slightly high on bilirubin and AST.     You have some protein in the urine, but the main thing to do for that is keep taking the valsartan.     Red blood count ( hemoglobin ) is a bit better.  Iron level normal.     Diabetes test ( hemoglobin a1c ) is fine.       Resulted Orders   Albumin Random Urine Quantitative with Creat Ratio   Result Value Ref Range    Creatinine Urine mg/dL 126.0 mg/dL      Comment:      The reference ranges have not been established in urine creatinine. The results should be integrated into the clinical context for interpretation.    Albumin Urine mg/L 38.8 mg/L      Comment:      The reference ranges have not been established in urine albumin. The results should be integrated into the clinical context for interpretation.    Albumin Urine mg/g Cr 30.79 (H) 0.00 - 17.00 mg/g Cr      Comment:      Microalbuminuria is defined as an albumin:creatinine ratio of 17 to 299 for males and 25 to 299 for females. A ratio of albumin:creatinine of 300 or higher is indicative of overt proteinuria.  Due to biologic variability, positive results should be confirmed by a second, first-morning random or 24-hour timed urine specimen. If there is discrepancy, a third specimen is recommended. When 2 out of 3 results are in the microalbuminuria range, this is evidence for incipient nephropathy and warrants increased efforts at glucose control, blood pressure control, and institution of therapy with an angiotensin-converting-enzyme (ACE) inhibitor (if the patient can tolerate it).     Comprehensive metabolic panel   Result Value Ref Range    Sodium 141 136 - 145 mmol/L    Potassium 4.5 3.4 - 5.3 mmol/L    Chloride 107 98 - 107 mmol/L    Carbon Dioxide (CO2) 23 22 -  29 mmol/L    Anion Gap 11 7 - 15 mmol/L    Urea Nitrogen 18.8 8.0 - 23.0 mg/dL    Creatinine 1.28 (H) 0.67 - 1.17 mg/dL    Calcium 9.2 8.8 - 10.2 mg/dL    Glucose 112 (H) 70 - 99 mg/dL    Alkaline Phosphatase 115 40 - 129 U/L    AST 56 (H) 0 - 45 U/L      Comment:      Reference intervals for this test were updated on 6/12/2023 to more accurately reflect our healthy population. There may be differences in the flagging of prior results with similar values performed with this method. Interpretation of those prior results can be made in the context of the updated reference intervals.    ALT 55 0 - 70 U/L      Comment:      Reference intervals for this test were updated on 6/12/2023 to more accurately reflect our healthy population. There may be differences in the flagging of prior results with similar values performed with this method. Interpretation of those prior results can be made in the context of the updated reference intervals.      Protein Total 6.8 6.4 - 8.3 g/dL    Albumin 4.0 3.5 - 5.2 g/dL    Bilirubin Total 1.4 (H) <=1.2 mg/dL    GFR Estimate 59 (L) >60 mL/min/1.73m2   Lipid panel reflex to direct LDL Non-fasting   Result Value Ref Range    Cholesterol 137 <200 mg/dL    Triglycerides 118 <150 mg/dL    Direct Measure HDL 47 >=40 mg/dL    LDL Cholesterol Calculated 66 <=100 mg/dL    Non HDL Cholesterol 90 <130 mg/dL    Narrative    Cholesterol  Desirable:  <200 mg/dL    Triglycerides  Normal:  Less than 150 mg/dL  Borderline High:  150-199 mg/dL  High:  200-499 mg/dL  Very High:  Greater than or equal to 500 mg/dL    Direct Measure HDL  Female:  Greater than or equal to 50 mg/dL   Male:  Greater than or equal to 40 mg/dL    LDL Cholesterol  Desirable:  <100mg/dL  Above Desirable:  100-129 mg/dL   Borderline High:  130-159 mg/dL   High:  160-189 mg/dL   Very High:  >= 190 mg/dL    Non HDL Cholesterol  Desirable:  130 mg/dL  Above Desirable:  130-159 mg/dL  Borderline High:  160-189 mg/dL  High:  190-219  mg/dL  Very High:  Greater than or equal to 220 mg/dL   Prostate Specific Antigen Screen   Result Value Ref Range    Prostate Specific Antigen Screen 1.49 0.00 - 6.50 ng/mL    Narrative    This result is obtained using the Roche Elecsys total PSA method on the lacie e801 immunoassay analyzer. Results obtained with different assay methods or kits cannot be used interchangeably.   Hemoglobin A1c   Result Value Ref Range    Hemoglobin A1C 5.9 (H) 0.0 - 5.6 %      Comment:      Normal <5.7%   Prediabetes 5.7-6.4%    Diabetes 6.5% or higher     Note: Adopted from ADA consensus guidelines.   Iron and iron binding capacity   Result Value Ref Range    Iron 97 61 - 157 ug/dL    Iron Binding Capacity 319 240 - 430 ug/dL    Iron Sat Index 30 15 - 46 %   Ferritin   Result Value Ref Range    Ferritin 214 31 - 409 ng/mL   Urine Creatinine for Drug Screen Panel   Result Value Ref Range    Creatinine Urine for Drug Screen 126 mg/dL      Comment:      The reference range has not been established for creatinine in random urines. The results should be integrated into the clinical context for interpretation.   CBC with platelets and differential   Result Value Ref Range    WBC Count 3.8 (L) 4.0 - 11.0 10e3/uL    RBC Count 3.65 (L) 4.40 - 5.90 10e6/uL    Hemoglobin 12.3 (L) 13.3 - 17.7 g/dL    Hematocrit 36.4 (L) 40.0 - 53.0 %     78 - 100 fL    MCH 33.7 (H) 26.5 - 33.0 pg    MCHC 33.8 31.5 - 36.5 g/dL    RDW 12.7 10.0 - 15.0 %    Platelet Count 51 (L) 150 - 450 10e3/uL    % Neutrophils 71 %    % Lymphocytes 19 %    % Monocytes 8 %    % Eosinophils 2 %    % Basophils 0 %    % Immature Granulocytes 0 %    NRBCs per 100 WBC 0 <1 /100    Absolute Neutrophils 2.7 1.6 - 8.3 10e3/uL    Absolute Lymphocytes 0.7 (L) 0.8 - 5.3 10e3/uL    Absolute Monocytes 0.3 0.0 - 1.3 10e3/uL    Absolute Eosinophils 0.1 0.0 - 0.7 10e3/uL    Absolute Basophils 0.0 0.0 - 0.2 10e3/uL    Absolute Immature Granulocytes 0.0 <=0.4 10e3/uL    Absolute NRBCs 0.0  10e3/uL       If you have any questions or concerns, please call the clinic at the number listed above.       Sincerely,      Richi Jaramillo MD

## 2023-08-30 ENCOUNTER — PATIENT OUTREACH (OUTPATIENT)
Dept: CARE COORDINATION | Facility: CLINIC | Age: 73
End: 2023-08-30
Payer: COMMERCIAL

## 2023-08-30 NOTE — RESULT ENCOUNTER NOTE
Kidney test ( creatinine ) is a bit better than last time.    Liver tests mostly normal; only slightly high on bilirubin and AST.    You have some protein in the urine, but the main thing to do for that is keep taking the valsartan.    Red blood count ( hemoglobin ) is a bit better.  Iron level normal.     Diabetes test ( hemoglobin a1c ) is fine.    Richi Jaramillo MD

## 2023-08-31 LAB
N-NORTRAMADOL/CREAT UR CFM: 3095 NG/MG {CREAT}
O-NORTRAMADOL UR CFM-MCNC: 3900 NG/ML
TRAMADOL CTO UR CFM-MCNC: 560 NG/ML
TRAMADOL/CREAT UR: 444 NG/MG {CREAT}

## 2023-09-08 ENCOUNTER — TELEPHONE (OUTPATIENT)
Dept: PULMONOLOGY | Facility: CLINIC | Age: 73
End: 2023-09-08
Payer: COMMERCIAL

## 2023-09-08 DIAGNOSIS — R06.09 DOE (DYSPNEA ON EXERTION): Primary | ICD-10-CM

## 2023-09-08 NOTE — TELEPHONE ENCOUNTER
is not set up for the patient to call back and schedule the following:    Appointment type: cxr  Provider: sadiq  Return date: 11/15/23  Specialty phone number: [phone]  Additional appointment(s) needed: n/a  Additonal Notes: n/a

## 2023-09-11 ENCOUNTER — TELEPHONE (OUTPATIENT)
Dept: PULMONOLOGY | Facility: CLINIC | Age: 73
End: 2023-09-11
Payer: COMMERCIAL

## 2023-09-11 NOTE — TELEPHONE ENCOUNTER
Patient Contacted for the patient to call back and schedule the following:    Appointment type: CXR  Provider: SABRINA  Return date: 11/15/23  Specialty phone number: [phone]  Additional appointment(s) needed: N/A   Additonal Notes: Will have cxr done in [facility]

## 2023-09-12 ENCOUNTER — HOSPITAL ENCOUNTER (OUTPATIENT)
Dept: NUCLEAR MEDICINE | Facility: CLINIC | Age: 73
Setting detail: NUCLEAR MEDICINE
Discharge: HOME | End: 2023-09-12
Attending: FAMILY MEDICINE
Payer: COMMERCIAL

## 2023-09-12 ENCOUNTER — PATIENT OUTREACH (OUTPATIENT)
Dept: GASTROENTEROLOGY | Facility: CLINIC | Age: 73
End: 2023-09-12

## 2023-09-12 ENCOUNTER — HOSPITAL ENCOUNTER (OUTPATIENT)
Dept: CARDIOLOGY | Facility: CLINIC | Age: 73
Discharge: HOME | End: 2023-09-12
Attending: FAMILY MEDICINE
Payer: COMMERCIAL

## 2023-09-12 ENCOUNTER — ANCILLARY PROCEDURE (OUTPATIENT)
Dept: GENERAL RADIOLOGY | Facility: CLINIC | Age: 73
End: 2023-09-12
Attending: STUDENT IN AN ORGANIZED HEALTH CARE EDUCATION/TRAINING PROGRAM
Payer: COMMERCIAL

## 2023-09-12 DIAGNOSIS — R06.09 DYSPNEA ON EXERTION: ICD-10-CM

## 2023-09-12 DIAGNOSIS — R06.09 DOE (DYSPNEA ON EXERTION): ICD-10-CM

## 2023-09-12 LAB
CV BLOOD PRESSURE: 75 MMHG
CV STRESS MAX HR HE: 77
NUC STRESS EJECTION FRACTION: 69 %
RATE PRESSURE PRODUCT: NORMAL
STRESS ECHO BASELINE DIASTOLIC HE: 64
STRESS ECHO BASELINE HR: 64 BPM
STRESS ECHO BASELINE SYSTOLIC BP: 138
STRESS ECHO CALCULATED PERCENT HR: 52 %
STRESS ECHO LAST STRESS DIASTOLIC BP: 70
STRESS ECHO LAST STRESS SYSTOLIC BP: 162
STRESS ECHO TARGET HR: 148
STRESS/REST PERFUSION RATIO: 1.05

## 2023-09-12 PROCEDURE — 250N000011 HC RX IP 250 OP 636: Mod: JZ | Performed by: FAMILY MEDICINE

## 2023-09-12 PROCEDURE — 93018 CV STRESS TEST I&R ONLY: CPT | Performed by: INTERNAL MEDICINE

## 2023-09-12 PROCEDURE — 93016 CV STRESS TEST SUPVJ ONLY: CPT | Performed by: INTERNAL MEDICINE

## 2023-09-12 PROCEDURE — 78452 HT MUSCLE IMAGE SPECT MULT: CPT

## 2023-09-12 PROCEDURE — 71046 X-RAY EXAM CHEST 2 VIEWS: CPT | Mod: TC | Performed by: RADIOLOGY

## 2023-09-12 PROCEDURE — A9502 TC99M TETROFOSMIN: HCPCS | Performed by: FAMILY MEDICINE

## 2023-09-12 PROCEDURE — 93017 CV STRESS TEST TRACING ONLY: CPT

## 2023-09-12 PROCEDURE — 78452 HT MUSCLE IMAGE SPECT MULT: CPT | Mod: 26 | Performed by: INTERNAL MEDICINE

## 2023-09-12 PROCEDURE — 343N000001 HC RX 343: Performed by: FAMILY MEDICINE

## 2023-09-12 RX ORDER — REGADENOSON 0.08 MG/ML
0.4 INJECTION, SOLUTION INTRAVENOUS ONCE
Status: COMPLETED | OUTPATIENT
Start: 2023-09-12 | End: 2023-09-12

## 2023-09-12 RX ADMIN — REGADENOSON 0.4 MG: 0.08 INJECTION, SOLUTION INTRAVENOUS at 11:31

## 2023-09-12 RX ADMIN — TETROFOSMIN 12.8 MILLICURIE: 1.38 INJECTION, POWDER, LYOPHILIZED, FOR SOLUTION INTRAVENOUS at 09:00

## 2023-09-12 RX ADMIN — TETROFOSMIN 37.7 MILLICURIE: 1.38 INJECTION, POWDER, LYOPHILIZED, FOR SOLUTION INTRAVENOUS at 10:30

## 2023-09-12 NOTE — LETTER
September 13, 2023    Canelo Logan  [address] [facility] [address]          Dear ,    We are writing to inform you of your test results.  Good news     Heart stress test is normal     This gives you a green light to exercise more       Resulted Orders   NM Lexiscan stress test   Result Value Ref Range    Target      Baseline Systolic      Baseline Diastolic BP 64     Last Stress Systolic      Last Stress Diastolic BP 70     Baseline HR 64 bpm    Max HR  77     Max Predicted HR  52 %    Rate Pressure Product 12,474.0     BP 75 mmHg    Left Ventricular EF 69 %    Stress/rest perfusion ratio 1.05     Narrative      The nuclear stress test is negative for inducible myocardial ischemia   or infarction.    Left ventricular function is normal.    The left ventricular ejection fraction at rest is 75%.  The left   ventricular ejection fraction at stress is 69%.    LV cavity size normal.    Stress to rest cavity ratio is 1.05.  TID is absent.    There is no prior study for comparison.         If you have any questions or concerns, please call the clinic at the number listed above.       Sincerely,      Richi Jaramillo MD

## 2023-09-13 ENCOUNTER — PATIENT OUTREACH (OUTPATIENT)
Dept: CARE COORDINATION | Facility: CLINIC | Age: 73
End: 2023-09-13
Payer: COMMERCIAL

## 2023-09-13 NOTE — RESULT ENCOUNTER NOTE
Good news    Heart stress test is normal    This gives you a green light to exercise more    Richi Jaramillo MD

## 2023-09-26 ENCOUNTER — TELEPHONE (OUTPATIENT)
Dept: GASTROENTEROLOGY | Facility: CLINIC | Age: 73
End: 2023-09-26
Payer: COMMERCIAL

## 2023-09-28 ENCOUNTER — TELEPHONE (OUTPATIENT)
Dept: GASTROENTEROLOGY | Facility: CLINIC | Age: 73
End: 2023-09-28
Payer: COMMERCIAL

## 2023-09-28 NOTE — TELEPHONE ENCOUNTER
Heidi from the [employer] customer support line called out endoscopy scheduling line to inform us that this patient no longer would like to receive calls regarding scheduling and he is not at all interested in scheduling anything.    Please do not make any other calls regarding scheduling appointments at this time.

## 2023-10-05 ENCOUNTER — TELEPHONE (OUTPATIENT)
Dept: FAMILY MEDICINE | Facility: CLINIC | Age: 73
End: 2023-10-05
Payer: COMMERCIAL

## 2023-10-05 DIAGNOSIS — J01.90 ACUTE SINUSITIS WITH SYMPTOMS > 10 DAYS: Primary | ICD-10-CM

## 2023-10-05 RX ORDER — AZITHROMYCIN 250 MG/1
TABLET, FILM COATED ORAL
Qty: 6 TABLET | Refills: 1 | Status: SHIPPED | OUTPATIENT
Start: 2023-10-05 | End: 2023-10-10

## 2023-10-05 NOTE — TELEPHONE ENCOUNTER
Called patient and relayed message from Dr. Jaramillo. Patient verbalized understanding.     Margarette Crowell RN   [facility]

## 2023-10-05 NOTE — TELEPHONE ENCOUNTER
"Patient reports that he has a history of sinus infections.     He has had a runny nose and sneezing for a few weeks.     Today patient reports sinus pressure below his right eye/cheek bone area. He states that it \"hurts to smile\" and the pressure is quite painful. No fever, but he is coughing up thick yellow sputum.     Patient does not take Tylenol (due to liver issues), however has needed to take Ibuprofen for the sinus pain.     Patient declines doing an E-Visit (does not have My Chart).     Last treated with Augmentin in late August for similar symptoms. Patient didn't find this all that helpful. He said it just kept his symptoms at bay, but he didn't have a big improvement.     Patient reports that Z-Prasanna (Azithromycin) has been somewhat helpful in the past.   He states that it took 2 courses of this back to back to clear things up at one point.     Patient is scheduled with Dr. Jaramillo next Tuesday 10/10/23 for office visit (multiple concerns).   Canelo asking if Dr. Jaramillo would be willing to start him on an antibiotic today.    He prefers getting started on something sooner than later.     Preferred pharmacy ([facility] in [place] cued ).     Please review and advise.     Carissa GREENN  [facility]       "

## 2023-10-10 ENCOUNTER — OFFICE VISIT (OUTPATIENT)
Dept: FAMILY MEDICINE | Facility: CLINIC | Age: 73
End: 2023-10-10
Payer: COMMERCIAL

## 2023-10-10 VITALS
TEMPERATURE: 97.5 F | WEIGHT: 246.38 LBS | OXYGEN SATURATION: 97 % | DIASTOLIC BLOOD PRESSURE: 54 MMHG | HEART RATE: 67 BPM | BODY MASS INDEX: 34.49 KG/M2 | SYSTOLIC BLOOD PRESSURE: 134 MMHG | HEIGHT: 71 IN | RESPIRATION RATE: 16 BRPM

## 2023-10-10 DIAGNOSIS — E66.9 OBESITY, UNSPECIFIED OBESITY SEVERITY, UNSPECIFIED OBESITY TYPE: ICD-10-CM

## 2023-10-10 DIAGNOSIS — I10 HYPERTENSION GOAL BP (BLOOD PRESSURE) < 140/90: ICD-10-CM

## 2023-10-10 DIAGNOSIS — R27.8 DYSGRAPHIA: ICD-10-CM

## 2023-10-10 DIAGNOSIS — S22.000A COMPRESSION FRACTURE OF THORACIC VERTEBRA, UNSPECIFIED THORACIC VERTEBRAL LEVEL, INITIAL ENCOUNTER (H): Primary | ICD-10-CM

## 2023-10-10 DIAGNOSIS — E11.42 TYPE 2 DIABETES MELLITUS WITH DIABETIC POLYNEUROPATHY, WITHOUT LONG-TERM CURRENT USE OF INSULIN (H): ICD-10-CM

## 2023-10-10 PROCEDURE — 99214 OFFICE O/P EST MOD 30 MIN: CPT | Performed by: FAMILY MEDICINE

## 2023-10-10 RX ORDER — TRAMADOL HYDROCHLORIDE 50 MG/1
50 TABLET ORAL EVERY 6 HOURS PRN
Qty: 30 TABLET | Refills: 0 | Status: SHIPPED | OUTPATIENT
Start: 2023-10-10 | End: 2023-11-09

## 2023-10-10 ASSESSMENT — PAIN SCALES - GENERAL: PAINLEVEL: MODERATE PAIN (5)

## 2023-10-10 NOTE — PROGRESS NOTES
"      Lakhwinder Lemos is a 73 year old, presenting for the following health issues:  RECHECK        10/10/2023     9:07 AM   Additional Questions   Roomed by Sofia Conde       HPI       Follow up        Review of Systems   Patient thinks he has dysgraphia    Back in school patient had problems writing  and typing    Sometimes hard to express things on paper or computer    Recently even harder to wrist and type  given his hand/ wrist issues    Pain in back is bad    No phys therapy currently    Plans on going to [facility] there perhaps    Not sleeping well    Melatonin not helping    Still active          Objective    BP (!) 156/76 (BP Location: Right arm, Patient Position: Chair, Cuff Size: Adult Regular)   Pulse 67   Temp 97.5  F (36.4  C) (Temporal)   Resp 16   Ht 1.803 m (5' 11\")   Wt 111.8 kg (246 lb 6 oz)   SpO2 97%   BMI 34.36 kg/m    Body mass index is 34.36 kg/m .  Physical Exam  Constitutional:       Appearance: He is well-developed.   HENT:      Head: Normocephalic and atraumatic.   Eyes:      Conjunctiva/sclera: Conjunctivae normal.   Neck:      Vascular: No carotid bruit.   Cardiovascular:      Rate and Rhythm: Normal rate and regular rhythm.      Heart sounds: Normal heart sounds.   Pulmonary:      Effort: Pulmonary effort is normal. No respiratory distress.      Breath sounds: Normal breath sounds.   Neurological:      Mental Status: He is alert and oriented to person, place, and time.      Cranial Nerves: No cranial nerve deficit.   Psychiatric:         Speech: Speech normal.         Behavior: Behavior normal.    Mild edema    Radials symmetric    Has the contractures on both palms    Fairly good range of motion of hands    Fair range of motion of back    ASSESSMENT / PLAN:  (S22.000A) Compression fracture of thoracic vertebra, unspecified thoracic vertebral level, initial encounter (H)  (primary encounter diagnosis)  Comment: patient asked about pain meds.  Specifically " wondered about something like oxycodone to have available for the bad pain.  I explained this was appropriate back when he had the compression fractures initially but with his current more chronic pain best to avoid those meds.  Did refill tramadol.  Try to use sparingly.    Plan: traMADol (ULTRAM) 50 MG tablet         He also has the pregabalin and muscle relaxers to use prn.    (R27.8) Dysgraphia  Comment: patient has always had problems with writing and typing.  His current hand issues make this even more difficult.   Plan: discussed     (I10) Hypertension goal BP (blood pressure) < 140/90  Comment: on recheck blood pressure okay  Plan: no change in meds     (E66.9) Obesity, unspecified obesity severity, unspecified obesity type  Comment: chronic   Plan: keep working on healthy diet/exercise and wt loss    (E11.42) Type 2 diabetes mellitus with diabetic polyneuropathy, without long-term current use of insulin (H)  Comment: last hemoglobin a1c fine  Plan: no change      I reviewed the patient's medications, allergies, medical history, family history, and social history.    Richi Jaramillo MD

## 2023-10-10 NOTE — PATIENT INSTRUCTIONS
Use tramadol  sparingly    Keep working on healthy diet/exercise     Stretching of hands    See us in December , do labs and refills then

## 2023-10-12 ENCOUNTER — TELEPHONE (OUTPATIENT)
Dept: FAMILY MEDICINE | Facility: CLINIC | Age: 73
End: 2023-10-12
Payer: COMMERCIAL

## 2023-10-12 DIAGNOSIS — S22.000A COMPRESSION FRACTURE OF THORACIC VERTEBRA, UNSPECIFIED THORACIC VERTEBRAL LEVEL, INITIAL ENCOUNTER (H): ICD-10-CM

## 2023-10-12 DIAGNOSIS — J01.90 ACUTE SINUSITIS WITH SYMPTOMS > 10 DAYS: Primary | ICD-10-CM

## 2023-10-12 RX ORDER — TRAMADOL HYDROCHLORIDE 50 MG/1
50 TABLET ORAL EVERY 6 HOURS PRN
Qty: 30 TABLET | Refills: 0 | OUTPATIENT
Start: 2023-10-12

## 2023-10-18 NOTE — CONFIDENTIAL NOTE
RECORDS RECEIVED FROM: internal    DATE RECEIVED: 11.15.23    NOTES STATUS DETAILS   OFFICE NOTE from referring provider internal  Self referred    OFFICE NOTE from other specialist     DISCHARGE SUMMARY from hospital     DISCHARGE REPORT from the ER     MEDICATION LIST internal     IMAGING  (NEED IMAGES AND REPORTS)     CT SCAN internal  2.20.23   CHEST XRAY (CXR) internal  9.12.23, 8.29.23    TESTS     PULMONARY FUNCTION TESTING (PFT) internal  Scheduled 11.15.23

## 2023-10-26 NOTE — TELEPHONE ENCOUNTER
Reason for Call:  Other call back    Detailed comments: He said he leaves a Message for DR Bateman Nurse about Medication and she calls him back to discuss what to put him on or if he needs to be seen. He fell in February Thinks he needs an Antibiotic shortness of breath and has slight Cold    Phone Number Patient can be reached at: Home number on file [phone] (home)    Best Time: Anytime    Can we leave a detailed message on this number? YES    Call taken on 10/26/2023 at 8:17 AM by Celi Atkins

## 2023-10-27 RX ORDER — AZITHROMYCIN 250 MG/1
TABLET, FILM COATED ORAL
Qty: 6 TABLET | Refills: 1 | Status: SHIPPED | OUTPATIENT
Start: 2023-10-27 | End: 2023-10-27

## 2023-10-27 RX ORDER — AZITHROMYCIN 250 MG/1
TABLET, FILM COATED ORAL
Qty: 6 TABLET | Refills: 1 | Status: SHIPPED | OUTPATIENT
Start: 2023-10-27 | End: 2023-11-02

## 2023-10-27 NOTE — TELEPHONE ENCOUNTER
"Dr. Jaramillo,    I called pt to gather more info. Per pt was seen beginning of month for fracture.     Per pt fracture effects his breathing. Has sinus infection and has chronic sinusitis and stated, \"you can check my history\". Pt sneezing, having SOB, has an appt to see pulmonology in mid Nov but wondering what to do about SOB. Thinks an abx will help. \"I am a long hauler with resp issues from COVID and this is not like COVID I can tell I know its a sinus infection of some kind\", \" I got a Zpack in the past and that cleared it right up\".    Patient said he called yesterday, which he did, and it did not get addressed until today as we have been short staffed.     Pharmacy: [facility] in [place]    ThanksJimena RN  [facility]     "

## 2023-10-27 NOTE — TELEPHONE ENCOUNTER
Spoke with pt and notified. Verbalized understanding.Per pt state he may need another dose as he feels he has needed this in past but he will try first. Pt also mentioned pain in wrist and wanted RN to add in chart.     Thanks,  CASANDRA Hess  [facility]

## 2023-11-02 RX ORDER — AZITHROMYCIN 250 MG/1
TABLET, FILM COATED ORAL
Qty: 6 TABLET | Refills: 1 | Status: SHIPPED | OUTPATIENT
Start: 2023-11-02 | End: 2023-11-02

## 2023-11-02 RX ORDER — AZITHROMYCIN 250 MG/1
TABLET, FILM COATED ORAL
Qty: 6 TABLET | Refills: 1 | Status: SHIPPED | OUTPATIENT
Start: 2023-11-02 | End: 2023-11-07

## 2023-11-02 NOTE — TELEPHONE ENCOUNTER
Called patient and relayed information. Patient asking if prescription can be resent to [facility] in [place] as that is closer to him. Writer resent per request.    No further questions.    ABI Szymanski RN  [facility]

## 2023-11-02 NOTE — TELEPHONE ENCOUNTER
"Dr. Jaramillo,     Patient calling about below. Stated he feels \"a second dose would be advantageous\".     [facility], same as last one.     Thanks,  CASANDRA Hess  [facility]     "

## 2023-11-09 DIAGNOSIS — S22.000A COMPRESSION FRACTURE OF THORACIC VERTEBRA, UNSPECIFIED THORACIC VERTEBRAL LEVEL, INITIAL ENCOUNTER (H): ICD-10-CM

## 2023-11-09 RX ORDER — TRAMADOL HYDROCHLORIDE 50 MG/1
50 TABLET ORAL EVERY 6 HOURS PRN
Qty: 30 TABLET | Refills: 0 | Status: SHIPPED | OUTPATIENT
Start: 2023-11-12 | End: 2023-11-30

## 2023-11-15 ENCOUNTER — OFFICE VISIT (OUTPATIENT)
Dept: PULMONOLOGY | Facility: CLINIC | Age: 73
End: 2023-11-15
Attending: STUDENT IN AN ORGANIZED HEALTH CARE EDUCATION/TRAINING PROGRAM
Payer: COMMERCIAL

## 2023-11-15 ENCOUNTER — PRE VISIT (OUTPATIENT)
Dept: PULMONOLOGY | Facility: CLINIC | Age: 73
End: 2023-11-15

## 2023-11-15 ENCOUNTER — OFFICE VISIT (OUTPATIENT)
Dept: PULMONOLOGY | Facility: CLINIC | Age: 73
End: 2023-11-15
Payer: COMMERCIAL

## 2023-11-15 VITALS — SYSTOLIC BLOOD PRESSURE: 114 MMHG | DIASTOLIC BLOOD PRESSURE: 64 MMHG | HEART RATE: 71 BPM | OXYGEN SATURATION: 96 %

## 2023-11-15 DIAGNOSIS — R06.09 DOE (DYSPNEA ON EXERTION): ICD-10-CM

## 2023-11-15 DIAGNOSIS — R06.09 DOE (DYSPNEA ON EXERTION): Primary | ICD-10-CM

## 2023-11-15 PROCEDURE — 94729 DIFFUSING CAPACITY: CPT | Performed by: INTERNAL MEDICINE

## 2023-11-15 PROCEDURE — 99205 OFFICE O/P NEW HI 60 MIN: CPT | Mod: 25 | Performed by: STUDENT IN AN ORGANIZED HEALTH CARE EDUCATION/TRAINING PROGRAM

## 2023-11-15 PROCEDURE — 94375 RESPIRATORY FLOW VOLUME LOOP: CPT | Performed by: INTERNAL MEDICINE

## 2023-11-15 PROCEDURE — G0463 HOSPITAL OUTPT CLINIC VISIT: HCPCS | Performed by: STUDENT IN AN ORGANIZED HEALTH CARE EDUCATION/TRAINING PROGRAM

## 2023-11-15 PROCEDURE — 94726 PLETHYSMOGRAPHY LUNG VOLUMES: CPT | Performed by: INTERNAL MEDICINE

## 2023-11-15 NOTE — LETTER
11/15/2023         RE: Gucci Logan  [address] [facility] [address]        Dear Colleague,    Thank you for referring your patient, Gucci Logan, to the [facility] [place]. Please see a copy of my visit note below.      Pulmonology Clinic Appointment     Gucci Logan MRN# [id number]   Age: 73 year old YOB: 1950         Reason for consult:    Gucci Logan is a 73 year old year old male who is being seen for New Patient (New Pulm )        Requesting physician:              Chief Complaint:   Dyspnea     History is obtained from the patient         History of Present Illness:   Gucci Logan is a 73 year old year old male with HTN, T2DM, hypothyroidism, medical history of previously treated hep C cirrhosis, is an ex smoker, referred due to intermittent dyspnea.     Patient has Cirrhosis from HepC, was previously treated with interferon he says, most recent serologies negative. He is an Ex smoker,  8 pack year, quit 35 years ago.    Had hospitalization after a fall in February while shoveling snow, went to hospital for difficulty breathing and was found to sustain T5-T6 fracture and TBI.    Has intermittent dyspnea that does not appear to have any trigger. He denies accompanying wheeze, stridor, reflux symptoms, chest pain, palpitations. Denies seasonal, diurnal/nocturnal, location/environment, or positional association. Says symptoms are self-limited and resolve with time or rest within minutes. He denies cough. Denies any known inhalational injuries.     Pulmonary function testing shows normal spirometry, lung volumes, and gas exchange. Thoracic imaging including CXR from September and CT from February are without any evidence of lung parenchymal or airway disease.     Nuclear stress test performed in September did not show inducible ischemia.   Prior echo in Feb 2023 was unable to properly visualize  aortic, pulmonic, or tricuspid valve, though there was evidenec of diastolic dysfunction and potential LVOT obstruction.          Past Medical History:     Past Medical History:   Diagnosis Date    Arthritis     Chronic, continuous use of opioids     Esophageal reflux 03/01/2014    Hearing problem     Hiatal hernia     Hypertension     Jaundice 05/31/2008    Liver disease     Obesity 01/24/2013    Obstructive sleep apnea     Sleep apnea     Advised CPAP machine. Not keen to use it.     Syncope, unspecified syncope type 2/20/2023            Past Surgical History:     Past Surgical History:   Procedure Laterality Date    ARTHROSCOPY KNEE RT/LT      (L) with partial medial meniscectomy    CATARACT IOL, RT/LT  Nov and Dec 2017    COLONOSCOPY N/A 4/24/2019    Procedure: COLONOSCOPY, WITH POLYPECTOMY AND BIOPSY;  Surgeon: Leventhal, Thomas Michael, MD;  [facility]    COLONOSCOPY N/A 9/14/2022    Procedure: COLONOSCOPY;  Surgeon: Rafa Renee MD;  Location:  GI    DACRYOCYSTORHINOSTOMY Left 10/16/2018    Procedure: DACRYOCYSTORHINOSTOMY;  Surgeon: Madhu Krause MD;  Location: [facility]    ENDOSCOPIC ENDONASAL SURGERY  1994    ENDOSCOPY  2-19-15    ESOPHAGOSCOPY, GASTROSCOPY, DUODENOSCOPY (EGD), COMBINED N/A 4/24/2019    Procedure: COMBINED ESOPHAGOSCOPY, GASTROSCOPY, DUODENOSCOPY (EGD) - hold aspirin ibuprofen or naproxen for one week prior (per physician order);  Surgeon: Leventhal, Thomas Michael, MD;  Location:  OR    ESOPHAGOSCOPY, GASTROSCOPY, DUODENOSCOPY (EGD), COMBINED N/A 5/23/2023    Procedure: Esophagoscopy, gastroscopy, duodenoscopy, combined;  Surgeon: Rafa Renee MD;  Location:  GI    EYE SURGERY      Hernia surgery Left 1994    NASAL/SINUS POLYPECTOMY      REPAIR PTOSIS Left 10/16/2018    Procedure: LEFT UPPER LID PTOSIS AND BILATERAL  BROW PTOSIS REPAIR WITH LEFT DACRYOCYSTORHINOSTOMY ;  Surgeon: Madhu Krause MD;  Location: [facility]    REPAIR PTOSIS BROW Bilateral 10/16/2018     Procedure: REPAIR PTOSIS BROW;  Surgeon: Madhu Krause MD;  Location: [facility]    ROTATOR CUFF REPAIR RT/LT Right     ZZC OPEN RX ANKLE DISLOCATN+FIXATN      (R)    ZZC SPINAL FUSION,ANT,EA ADNL LEVEL      T12 - L1            Social History:     Social History     Socioeconomic History    Marital status: Single     Spouse name: Not on file    Number of children: 0    Years of education: 18    Highest education level: Not on file   Occupational History    Occupation: Contractor     Employer: [employer]     Comment: Not working now   Tobacco Use    Smoking status: Former     Packs/day: 0.00     Years: 5.00     Additional pack years: 0.00     Total pack years: 0.00     Types: Cigarettes     Start date: 1990     Quit date: 1999     Years since quittin.8    Smokeless tobacco: Never   Vaping Use    Vaping Use: Never used   Substance and Sexual Activity    Alcohol use: Yes     Comment: rare    Drug use: No    Sexual activity: Not Currently     Partners: Female   Other Topics Concern    Parent/sibling w/ CABG, MI or angioplasty before 65F 55M? No   Social History Narrative    Not on file     Social Determinants of Health     Financial Resource Strain: Not on file   Food Insecurity: Not on file   Transportation Needs: Not on file   Physical Activity: Not on file   Stress: Not on file   Social Connections: Not on file   Interpersonal Safety: Low Risk  (10/10/2023)    Interpersonal Safety     Do you feel physically and emotionally safe where you currently live?: Yes     Within the past 12 months, have you been hit, slapped, kicked or otherwise physically hurt by someone?: No     Within the past 12 months, have you been humiliated or emotionally abused in other ways by your partner or ex-partner?: No   Housing Stability: Not on file             Family History:     Family History   Problem Relation Age of Onset    Alzheimer Disease Mother 85    Dementia Mother     Cerebrovascular Disease Father 50    Cancer Father      Hypertension Father     Neurologic Disorder No family hx of     Diabetes No family hx of            Immunizations:     Immunization History   Administered Date(s) Administered    COVID-19 12+ (2023-24) (Pfizer) 09/21/2023    COVID-19 Bivalent 18+ (Moderna) 10/18/2022, 05/10/2023    COVID-19 Monovalent 18+ (Moderna) 02/27/2021, 03/27/2021, 10/25/2021, 07/07/2022    HEPA 09/08/2004, 03/01/2005    HepB 02/02/2004, 09/08/2004, 03/01/2005    Influenza (High Dose) 3 valent vaccine 08/24/2016    Influenza (IIV3) PF 11/05/2008, 10/15/2009, 09/22/2011, 12/12/2012    Influenza Vaccine 65+ (FLUAD) 10/01/2021, 08/27/2022, 09/02/2023    Influenza Vaccine >6 months (Alfuria,Fluzone) 11/02/2020    Pneumo Conj 13-V (2010&after) 11/02/2020    Pneumococcal 23 valent 11/03/2021    RSV Vaccine (Arexvy) 09/02/2023    TD,PF 7+ (Tenivac) 09/14/2006    TDAP Vaccine (Boostrix) 10/06/2016    Twinrix A/B 05/10/2023    Zoster recombinant adjuvanted (SHINGRIX) 05/17/2021, 02/09/2022          Allergies:     Allergies   Allergen Reactions    Bee Venom     Influenza Vaccines Other (See Comments)     delirium  fever            Medications:     Current Outpatient Medications   Medication    atorvastatin (LIPITOR) 10 MG tablet    cholecalciferol 25 MCG (1000 UT) TABS    hydrochlorothiazide (HYDRODIURIL) 50 MG tablet    levothyroxine (SYNTHROID/LEVOTHROID) 25 MCG tablet    metFORMIN (GLUCOPHAGE) 500 MG tablet    metoprolol succinate ER (TOPROL XL) 100 MG 24 hr tablet    multivitamin w/minerals (THERA-VIT-M) tablet    omeprazole (PRILOSEC) 20 MG DR capsule    OZEMPIC, 0.25 OR 0.5 MG/DOSE, 2 MG/3ML pen    polyethylene glycol (MIRALAX) 17 g packet    pregabalin (LYRICA) 75 MG capsule    spironolactone (ALDACTONE) 25 MG tablet    tamsulosin (FLOMAX) 0.4 MG capsule    tiZANidine (ZANAFLEX) 4 MG tablet    traMADol (ULTRAM) 50 MG tablet    valsartan (DIOVAN) 160 MG tablet     No current facility-administered medications for this visit.          Review of  Systems:   The Review of Systems is negative other than noted in the HPI         Physical Exam:   /64   Pulse 71   SpO2 96%   Patient declined being weighed.     GENERAL APPEARANCE:  alert, and in no apparent distress.  EYES: PERRL, EOMI  HENT: Nasal mucosa with no edema and no hyperemia. No nasal polyps.  MOUTH: Oral mucosa is moist, without any lesions, no tonsillar enlargement, no oropharyngeal exudate.  NECK: supple, no masses, no thyromegaly.  LYMPHATICS: No palpable axillary, cervical, or supraclavicular nodes.  RESP: good air flow throughout.  No crackles. No rhonchi. No wheezes. Normal percussion. Normal chest expansion  CV: Normal S1, S2, regular rhythm, normal rate. No murmur.  No rub. No gallop. No LE edema.   ABDOMEN:  soft, nontender, no HSM or masses. Normal bowel sounds  MS: extremities normal. No clubbing. No cyanosis.  SKIN: no rash on limited exam   NEURO: speech normal, normal strength and tone, normal gait and stance  PSYCH: normal mood and affect         Data:   laboratory data reviewed  cardiac studies reviewed by me.  imaging studies reviewed by me.                        Assessment and Plan:     Dyspnea  Non specific symptoms. Lack of parenchymal or airway disease on imaging, nor abnormalities of airflow, lung volumes or gas diffusion argue against a pulmonary etiology. Lack of relation to exertion, environmental exposure, further argue the same as well as making a coronary ischemia related etiology unlikely (in addition to the negative nuclear stress test). The intermittent and self-resolving nature of symptoms could represent an arrhythmogenic etiology.  Have referred back to cardiology and suggest holter monitor for eval, though patient is already on beta blockade and would be questionably a candidate for rhythm control or ablative intervention, but will defer to cardiology for further workup.  Have ordered a repeat echocardiogram hoping it better visualizes the valves and LVOT  than the prior.     Discussed my evaluation, assessment and plans extensively with patient. He was able to ask questions and was satisfied with and agreeable to plan of action.         I spent a total of 60 minutes on the day of the encounter dedicated to the office visit with Gucci Logan.    This included review of vitals, labs, imaging, other diagnostic tests (I.e. PFTs, ECHO), face-to-face interaction, physical exam, counseling the patient +/- family members, and/or coordinating care.    Noe Mckee MD

## 2023-11-15 NOTE — NURSING NOTE
Chief Complaint   Patient presents with    New Patient     New Pulm      Vitals were taken and medications were reconciled.     Maya Frederick RMA  2:12 PM

## 2023-11-15 NOTE — PATIENT INSTRUCTIONS
-I have referred you to see cardiology in [place] and ordered an echocardiogram to be done prior   -Please ask them about evaluating for your intermittent shortness of breath and whether you may have an arrhythmia or problems with one of the heart valves

## 2023-11-15 NOTE — PROGRESS NOTES
Pulmonology Clinic Appointment     Gucci Logan MRN# [id number]   Age: 73 year old YOB: 1950         Reason for consult:    Gucci Logan is a 73 year old year old male who is being seen for New Patient (New Pulm )        Requesting physician:              Chief Complaint:   Dyspnea     History is obtained from the patient         History of Present Illness:   Gucci Logan is a 73 year old year old male with HTN, T2DM, hypothyroidism, medical history of previously treated hep C cirrhosis, is an ex smoker, referred due to intermittent dyspnea.     Patient has Cirrhosis from HepC, was previously treated with interferon he says, most recent serologies negative. He is an Ex smoker,  8 pack year, quit 35 years ago.    Had hospitalization after a fall in February while shoveling snow, went to hospital for difficulty breathing and was found to sustain T5-T6 fracture and TBI.    Has intermittent dyspnea that does not appear to have any trigger. He denies accompanying wheeze, stridor, reflux symptoms, chest pain, palpitations. Denies seasonal, diurnal/nocturnal, location/environment, or positional association. Says symptoms are self-limited and resolve with time or rest within minutes. He denies cough. Denies any known inhalational injuries.     Pulmonary function testing shows normal spirometry, lung volumes, and gas exchange. Thoracic imaging including CXR from September and CT from February are without any evidence of lung parenchymal or airway disease.     Nuclear stress test performed in September did not show inducible ischemia.   Prior echo in Feb 2023 was unable to properly visualize aortic, pulmonic, or tricuspid valve, though there was evidenec of diastolic dysfunction and potential LVOT obstruction.          Past Medical History:     Past Medical History:   Diagnosis Date    Arthritis     Chronic, continuous use of opioids     Esophageal reflux 03/01/2014    Hearing problem      Hiatal hernia     Hypertension     Jaundice 05/31/2008    Liver disease     Obesity 01/24/2013    Obstructive sleep apnea     Sleep apnea     Advised CPAP machine. Not keen to use it.     Syncope, unspecified syncope type 2/20/2023            Past Surgical History:     Past Surgical History:   Procedure Laterality Date    ARTHROSCOPY KNEE RT/LT      (L) with partial medial meniscectomy    CATARACT IOL, RT/LT  Nov and Dec 2017    COLONOSCOPY N/A 4/24/2019    Procedure: COLONOSCOPY, WITH POLYPECTOMY AND BIOPSY;  Surgeon: Leventhal, Thomas Michael, MD;  [facility]    COLONOSCOPY N/A 9/14/2022    Procedure: COLONOSCOPY;  Surgeon: Rafa Renee MD;  Location:  GI    DACRYOCYSTORHINOSTOMY Left 10/16/2018    Procedure: DACRYOCYSTORHINOSTOMY;  Surgeon: Madhu Krause MD;  Location: [facility]    ENDOSCOPIC ENDONASAL SURGERY  1994    ENDOSCOPY  2-19-15    ESOPHAGOSCOPY, GASTROSCOPY, DUODENOSCOPY (EGD), COMBINED N/A 4/24/2019    Procedure: COMBINED ESOPHAGOSCOPY, GASTROSCOPY, DUODENOSCOPY (EGD) - hold aspirin ibuprofen or naproxen for one week prior (per physician order);  Surgeon: Leventhal, Thomas Michael, MD;  [facility]    ESOPHAGOSCOPY, GASTROSCOPY, DUODENOSCOPY (EGD), COMBINED N/A 5/23/2023    Procedure: Esophagoscopy, gastroscopy, duodenoscopy, combined;  Surgeon: Rafa Renee MD;  Location:  GI    EYE SURGERY      Hernia surgery Left 1994    NASAL/SINUS POLYPECTOMY      REPAIR PTOSIS Left 10/16/2018    Procedure: LEFT UPPER LID PTOSIS AND BILATERAL  BROW PTOSIS REPAIR WITH LEFT DACRYOCYSTORHINOSTOMY ;  Surgeon: Madhu Krause MD;  Location: [facility]    REPAIR PTOSIS BROW Bilateral 10/16/2018    Procedure: REPAIR PTOSIS BROW;  Surgeon: Madhu Krause MD;  Location: [facility]    ROTATOR CUFF REPAIR RT/LT Right     ZZC OPEN RX ANKLE DISLOCATN+FIXATN      (R)    ZZC SPINAL FUSION,ANT,EA ADNL LEVEL      T12 - L1            Social History:     Social History     Socioeconomic History    Marital  status: Single     Spouse name: Not on file    Number of children: 0    Years of education: 18    Highest education level: Not on file   Occupational History    Occupation: Contractor     Employer: [employer]     Comment: Not working now   Tobacco Use    Smoking status: Former     Packs/day: 0.00     Years: 5.00     Additional pack years: 0.00     Total pack years: 0.00     Types: Cigarettes     Start date: 1990     Quit date: 1999     Years since quittin.8    Smokeless tobacco: Never   Vaping Use    Vaping Use: Never used   Substance and Sexual Activity    Alcohol use: Yes     Comment: rare    Drug use: No    Sexual activity: Not Currently     Partners: Female   Other Topics Concern    Parent/sibling w/ CABG, MI or angioplasty before 65F 55M? No   Social History Narrative    Not on file     Social Determinants of Health     Financial Resource Strain: Not on file   Food Insecurity: Not on file   Transportation Needs: Not on file   Physical Activity: Not on file   Stress: Not on file   Social Connections: Not on file   Interpersonal Safety: Low Risk  (10/10/2023)    Interpersonal Safety     Do you feel physically and emotionally safe where you currently live?: Yes     Within the past 12 months, have you been hit, slapped, kicked or otherwise physically hurt by someone?: No     Within the past 12 months, have you been humiliated or emotionally abused in other ways by your partner or ex-partner?: No   Housing Stability: Not on file             Family History:     Family History   Problem Relation Age of Onset    Alzheimer Disease Mother 85    Dementia Mother     Cerebrovascular Disease Father 50    Cancer Father     Hypertension Father     Neurologic Disorder No family hx of     Diabetes No family hx of            Immunizations:     Immunization History   Administered Date(s) Administered    COVID-19 12+ () (Pfizer) 2023    COVID-19 Bivalent 18+ (Moderna) 10/18/2022, 05/10/2023    COVID-19  Monovalent 18+ (Moderna) 02/27/2021, 03/27/2021, 10/25/2021, 07/07/2022    HEPA 09/08/2004, 03/01/2005    HepB 02/02/2004, 09/08/2004, 03/01/2005    Influenza (High Dose) 3 valent vaccine 08/24/2016    Influenza (IIV3) PF 11/05/2008, 10/15/2009, 09/22/2011, 12/12/2012    Influenza Vaccine 65+ (FLUAD) 10/01/2021, 08/27/2022, 09/02/2023    Influenza Vaccine >6 months (Alfuria,Fluzone) 11/02/2020    Pneumo Conj 13-V (2010&after) 11/02/2020    Pneumococcal 23 valent 11/03/2021    RSV Vaccine (Arexvy) 09/02/2023    TD,PF 7+ (Tenivac) 09/14/2006    TDAP Vaccine (Boostrix) 10/06/2016    Twinrix A/B 05/10/2023    Zoster recombinant adjuvanted (SHINGRIX) 05/17/2021, 02/09/2022          Allergies:     Allergies   Allergen Reactions    Bee Venom     Influenza Vaccines Other (See Comments)     delirium  fever            Medications:     Current Outpatient Medications   Medication    atorvastatin (LIPITOR) 10 MG tablet    cholecalciferol 25 MCG (1000 UT) TABS    hydrochlorothiazide (HYDRODIURIL) 50 MG tablet    levothyroxine (SYNTHROID/LEVOTHROID) 25 MCG tablet    metFORMIN (GLUCOPHAGE) 500 MG tablet    metoprolol succinate ER (TOPROL XL) 100 MG 24 hr tablet    multivitamin w/minerals (THERA-VIT-M) tablet    omeprazole (PRILOSEC) 20 MG DR capsule    OZEMPIC, 0.25 OR 0.5 MG/DOSE, 2 MG/3ML pen    polyethylene glycol (MIRALAX) 17 g packet    pregabalin (LYRICA) 75 MG capsule    spironolactone (ALDACTONE) 25 MG tablet    tamsulosin (FLOMAX) 0.4 MG capsule    tiZANidine (ZANAFLEX) 4 MG tablet    traMADol (ULTRAM) 50 MG tablet    valsartan (DIOVAN) 160 MG tablet     No current facility-administered medications for this visit.          Review of Systems:   The Review of Systems is negative other than noted in the HPI         Physical Exam:   /64   Pulse 71   SpO2 96%   Patient declined being weighed.     GENERAL APPEARANCE:  alert, and in no apparent distress.  EYES: PERRL, EOMI  HENT: Nasal mucosa with no edema and no  hyperemia. No nasal polyps.  MOUTH: Oral mucosa is moist, without any lesions, no tonsillar enlargement, no oropharyngeal exudate.  NECK: supple, no masses, no thyromegaly.  LYMPHATICS: No palpable axillary, cervical, or supraclavicular nodes.  RESP: good air flow throughout.  No crackles. No rhonchi. No wheezes. Normal percussion. Normal chest expansion  CV: Normal S1, S2, regular rhythm, normal rate. No murmur.  No rub. No gallop. No LE edema.   ABDOMEN:  soft, nontender, no HSM or masses. Normal bowel sounds  MS: extremities normal. No clubbing. No cyanosis.  SKIN: no rash on limited exam   NEURO: speech normal, normal strength and tone, normal gait and stance  PSYCH: normal mood and affect         Data:   laboratory data reviewed  cardiac studies reviewed by me.  imaging studies reviewed by me.                        Assessment and Plan:     Dyspnea  Non specific symptoms. Lack of parenchymal or airway disease on imaging, nor abnormalities of airflow, lung volumes or gas diffusion argue against a pulmonary etiology. Lack of relation to exertion, environmental exposure, further argue the same as well as making a coronary ischemia related etiology unlikely (in addition to the negative nuclear stress test). The intermittent and self-resolving nature of symptoms could represent an arrhythmogenic etiology.  Have referred back to cardiology and suggest holter monitor for eval, though patient is already on beta blockade and would be questionably a candidate for rhythm control or ablative intervention, but will defer to cardiology for further workup.  Have ordered a repeat echocardiogram hoping it better visualizes the valves and LVOT than the prior.     Discussed my evaluation, assessment and plans extensively with patient. He was able to ask questions and was satisfied with and agreeable to plan of action.         I spent a total of 60 minutes on the day of the encounter dedicated to the office visit with Gucci MARTÍNEZ  Desmond.    This included review of vitals, labs, imaging, other diagnostic tests (I.e. PFTs, ECHO), face-to-face interaction, physical exam, counseling the patient +/- family members, and/or coordinating care.    Noe Mckee MD

## 2023-11-16 ENCOUNTER — TELEPHONE (OUTPATIENT)
Dept: CARDIOLOGY | Facility: CLINIC | Age: 73
End: 2023-11-16
Payer: COMMERCIAL

## 2023-11-16 LAB
DLCOUNC-%PRED-PRE: 87 %
DLCOUNC-PRE: 23.01 ML/MIN/MMHG
DLCOUNC-PRED: 26.24 ML/MIN/MMHG
ERV-%PRED-PRE: 59 %
ERV-PRE: 0.89 L
ERV-PRED: 1.49 L
EXPTIME-PRE: 7.21 SEC
FEF2575-%PRED-PRE: 96 %
FEF2575-PRE: 2.2 L/SEC
FEF2575-PRED: 2.27 L/SEC
FEFMAX-%PRED-PRE: 111 %
FEFMAX-PRE: 9.33 L/SEC
FEFMAX-PRED: 8.34 L/SEC
FEV1-%PRED-PRE: 95 %
FEV1-PRE: 2.87 L
FEV1FEV6-PRE: 77 %
FEV1FEV6-PRED: 77 %
FEV1FVC-PRE: 76 %
FEV1FVC-PRED: 76 %
FEV1SVC-PRE: 73 %
FEV1SVC-PRED: 71 %
FIFMAX-PRE: 5.72 L/SEC
FRCPLETH-%PRED-PRE: 102 %
FRCPLETH-PRE: 3.86 L
FRCPLETH-PRED: 3.78 L
FVC-%PRED-PRE: 95 %
FVC-PRE: 3.79 L
FVC-PRED: 3.96 L
IC-%PRED-PRE: 102 %
IC-PRE: 3.06 L
IC-PRED: 2.97 L
RVPLETH-%PRED-PRE: 108 %
RVPLETH-PRE: 2.97 L
RVPLETH-PRED: 2.73 L
TLCPLETH-%PRED-PRE: 94 %
TLCPLETH-PRE: 6.92 L
TLCPLETH-PRED: 7.33 L
VA-%PRED-PRE: 91 %
VA-PRE: 6.05 L
VC-%PRED-PRE: 93 %
VC-PRE: 3.95 L
VC-PRED: 4.24 L

## 2023-11-16 NOTE — TELEPHONE ENCOUNTER
[facility] Call Center    Phone Message    May a detailed message be left on voicemail: no     Reason for Call: Appointment Intake    Referring Provider Name: Dr. Mckee    Diagnosis and/or Symptoms: intermittent dyspnea, poor quality prior echo with concern for LVOT obstruction, I am also questioning whether pAF is responsible     Pt has echo order as well and wanted writer to reach out to clinic at [place] to see if the echo machine was fixed- pt stated the previous results of last echo were unclear and he is trrying to clarify  what the issue was prior to rescheduling and getting charged for another unclear result.    Please call pt to discuss and/or Dr. Mckee to clarify what is unclear, per pt.     Action Taken: Other: cardio    Travel Screening: Not Applicable

## 2023-11-17 NOTE — TELEPHONE ENCOUNTER
Attempted to call patient. Unable to reach patient. No mailbox setup.    Jacqueline Sepulveda, RN, BSN  Cardiology RN Care Coordinator   [place]   Phone: [phone]  Fax: [phone] ([place]) [phone] ([place])

## 2023-11-19 NOTE — RESULT ENCOUNTER NOTE
Results discussed directly with patient while patient was present. Any further details documented in the note.   Noe Mckee MD

## 2023-11-20 NOTE — TELEPHONE ENCOUNTER
"Last echo was performed at [place].  The results are not referring to the machine it is referring to the patients \"cardiac window\" for ultrasound.  We can repeat the echo here at [place].  Attempted to contact patient, no answer, no vm box set up will try again at a later time.    Viji Higuera, RN  Cardiology Care Coordinator  [facility]  [phone] option 1      "

## 2023-11-22 NOTE — TELEPHONE ENCOUNTER
[facility] Call Center    Phone Message    May a detailed message be left on voicemail: yes     Reason for Call: Other: Patient called back wanting to schedule Echo and New Cardiology appt. Patient stated he is going out side of [facility] now. He does not want to schedule appt. Patient did not want wait to get in.      Action Taken: Other: cardiology     Travel Screening: Not Applicable    Thank you!  [facility]

## 2023-11-24 ENCOUNTER — TELEPHONE (OUTPATIENT)
Dept: FAMILY MEDICINE | Facility: CLINIC | Age: 73
End: 2023-11-24
Payer: COMMERCIAL

## 2023-11-24 DIAGNOSIS — I51.89 DIASTOLIC DYSFUNCTION: ICD-10-CM

## 2023-11-24 DIAGNOSIS — R06.00 DYSPNEA, UNSPECIFIED TYPE: Primary | ICD-10-CM

## 2023-11-24 NOTE — TELEPHONE ENCOUNTER
Order/Referral Request    Who is requesting: Canelo Logan    Orders being requested: referral to see cardiologist ASAP, has an appointment with DR Harvey fax # [phone]    Reason service is needed/diagnosis: to follow up on echocardiogram    When are orders needed by: as soon as possible    Has this been discussed with Provider: Yes    Does patient have a preference on a Group/Provider/Facility? Dr. Brent Harvey, cardiologist from [place]    Does patient have an appointment scheduled?: No    Where to send orders: Fax    Okay to leave a detailed message?: No at Cell number on file:    Telephone Information:   Mobile [phone]

## 2023-11-24 NOTE — TELEPHONE ENCOUNTER
There is a cardiology referral in from 11/15/23 for [facility], the same date that echo was placed, can this referral be faxed by our TC to Dr. Brent Harvey, cardiologist from [place], or does a new referral need to be placed by primary care?    CASANDRA Pfeiffer  [facility]

## 2023-11-27 NOTE — TELEPHONE ENCOUNTER
"Patient calling, demanding referral as soon as writer answered call. Writer apologized but discussed that writer needed to complete chart review to be informed for call, patient began to talk over writer while attempting to discuss concern. Patient states that the team \"just cannot get it right, I've been doing this since Friday\", writer apologized but noted that provider has placed order.     Patient is needing referral faxed to Dr. Carlson's team with Jason Frias, writer notes PCP has placed note with \"THIS IS EXTERNAL REFERRAL SEEING DR SREEKANTH CARLSON IN [facility] [place]\" as this should suffice.    Patient states that he is expecting a call back within an hour, writer apologized but noted that a call within one hour cannot be guaranteed per current workflow of the clinic.    Team- Please fax cardiology referral with enc date of 11/24/23 by Dr. Richi Jaramillo to Mckay Carlson's team at fax#: [phone]     Please call patient back at ph#: [phone]  when this has been completed.    Thanks,  PETER SzymanskiN RN  [facility] [place]    "

## 2023-11-27 NOTE — TELEPHONE ENCOUNTER
Dr. Jaramillo    Patient called back about below again. Per pt he does not want to have to reschedule appt with cards. Asking if he can please have the referral today.     Thanks,  CASANDRA Hess  [facility]

## 2023-12-04 ENCOUNTER — ANCILLARY PROCEDURE (OUTPATIENT)
Dept: CARDIOLOGY | Facility: CLINIC | Age: 73
End: 2023-12-04
Attending: STUDENT IN AN ORGANIZED HEALTH CARE EDUCATION/TRAINING PROGRAM
Payer: COMMERCIAL

## 2023-12-04 DIAGNOSIS — R06.09 DOE (DYSPNEA ON EXERTION): ICD-10-CM

## 2023-12-04 LAB — LVEF ECHO: NORMAL

## 2023-12-04 PROCEDURE — 93306 TTE W/DOPPLER COMPLETE: CPT | Performed by: INTERNAL MEDICINE

## 2023-12-20 ENCOUNTER — OFFICE VISIT (OUTPATIENT)
Dept: FAMILY MEDICINE | Facility: CLINIC | Age: 73
End: 2023-12-20
Payer: COMMERCIAL

## 2023-12-20 VITALS
HEIGHT: 71 IN | DIASTOLIC BLOOD PRESSURE: 75 MMHG | HEART RATE: 87 BPM | SYSTOLIC BLOOD PRESSURE: 136 MMHG | RESPIRATION RATE: 18 BRPM | BODY MASS INDEX: 34.04 KG/M2 | TEMPERATURE: 98 F | OXYGEN SATURATION: 98 % | WEIGHT: 243.13 LBS

## 2023-12-20 DIAGNOSIS — D69.6 THROMBOCYTOPENIA (H): ICD-10-CM

## 2023-12-20 DIAGNOSIS — E03.9 HYPOTHYROIDISM, UNSPECIFIED TYPE: ICD-10-CM

## 2023-12-20 DIAGNOSIS — E11.9 NON-INSULIN DEPENDENT TYPE 2 DIABETES MELLITUS (H): ICD-10-CM

## 2023-12-20 DIAGNOSIS — S22.000A COMPRESSION FRACTURE OF THORACIC VERTEBRA, UNSPECIFIED THORACIC VERTEBRAL LEVEL, INITIAL ENCOUNTER (H): ICD-10-CM

## 2023-12-20 DIAGNOSIS — K21.9 GASTROESOPHAGEAL REFLUX DISEASE WITHOUT ESOPHAGITIS: ICD-10-CM

## 2023-12-20 DIAGNOSIS — R94.39 ABNORMAL STRESS TEST: ICD-10-CM

## 2023-12-20 DIAGNOSIS — M79.2 NERVE PAIN: ICD-10-CM

## 2023-12-20 DIAGNOSIS — L84 CALLUS OF FOOT: ICD-10-CM

## 2023-12-20 DIAGNOSIS — R06.09 DYSPNEA ON EXERTION: Primary | ICD-10-CM

## 2023-12-20 DIAGNOSIS — E11.43 TYPE 2 DIABETES MELLITUS WITH DIABETIC AUTONOMIC NEUROPATHY, WITHOUT LONG-TERM CURRENT USE OF INSULIN (H): ICD-10-CM

## 2023-12-20 DIAGNOSIS — N40.1 BENIGN PROSTATIC HYPERPLASIA WITH LOWER URINARY TRACT SYMPTOMS, SYMPTOM DETAILS UNSPECIFIED: ICD-10-CM

## 2023-12-20 DIAGNOSIS — E78.5 HYPERLIPIDEMIA LDL GOAL <100: ICD-10-CM

## 2023-12-20 DIAGNOSIS — I10 HYPERTENSION GOAL BP (BLOOD PRESSURE) < 140/90: ICD-10-CM

## 2023-12-20 LAB
ACANTHOCYTES BLD QL SMEAR: NORMAL
ALBUMIN SERPL BCG-MCNC: 3.9 G/DL (ref 3.5–5.2)
ALP SERPL-CCNC: 120 U/L (ref 40–150)
ALT SERPL W P-5'-P-CCNC: 64 U/L (ref 0–70)
ANION GAP SERPL CALCULATED.3IONS-SCNC: 10 MMOL/L (ref 7–15)
AST SERPL W P-5'-P-CCNC: 58 U/L (ref 0–45)
AUER BODIES BLD QL SMEAR: NORMAL
BASO STIPL BLD QL SMEAR: NORMAL
BASOPHILS # BLD AUTO: 0 10E3/UL (ref 0–0.2)
BASOPHILS NFR BLD AUTO: 0 %
BILIRUB SERPL-MCNC: 1.1 MG/DL
BITE CELLS BLD QL SMEAR: NORMAL
BLISTER CELLS BLD QL SMEAR: NORMAL
BUN SERPL-MCNC: 24 MG/DL (ref 8–23)
BURR CELLS BLD QL SMEAR: NORMAL
CALCIUM SERPL-MCNC: 9.3 MG/DL (ref 8.8–10.2)
CHLORIDE SERPL-SCNC: 110 MMOL/L (ref 98–107)
CHOLEST SERPL-MCNC: 137 MG/DL
CREAT SERPL-MCNC: 1.26 MG/DL (ref 0.67–1.17)
DACRYOCYTES BLD QL SMEAR: NORMAL
DEPRECATED HCO3 PLAS-SCNC: 23 MMOL/L (ref 22–29)
EGFRCR SERPLBLD CKD-EPI 2021: 61 ML/MIN/1.73M2
ELLIPTOCYTES BLD QL SMEAR: NORMAL
EOSINOPHIL # BLD AUTO: 0 10E3/UL (ref 0–0.7)
EOSINOPHIL NFR BLD AUTO: 1 %
ERYTHROCYTE [DISTWIDTH] IN BLOOD BY AUTOMATED COUNT: 13.2 % (ref 10–15)
FASTING STATUS PATIENT QL REPORTED: YES
FRAGMENTS BLD QL SMEAR: NORMAL
GLUCOSE SERPL-MCNC: 100 MG/DL (ref 70–99)
HBA1C MFR BLD: 5.3 % (ref 0–5.6)
HCT VFR BLD AUTO: 36.5 % (ref 40–53)
HDLC SERPL-MCNC: 48 MG/DL
HGB BLD-MCNC: 12 G/DL (ref 13.3–17.7)
HGB C CRYSTALS: NORMAL
HOWELL-JOLLY BOD BLD QL SMEAR: NORMAL
IMM GRANULOCYTES # BLD: 0 10E3/UL
IMM GRANULOCYTES NFR BLD: 0 %
LDLC SERPL CALC-MCNC: 58 MG/DL
LYMPHOCYTES # BLD AUTO: 0.6 10E3/UL (ref 0.8–5.3)
LYMPHOCYTES NFR BLD AUTO: 16 %
MCH RBC QN AUTO: 32.6 PG (ref 26.5–33)
MCHC RBC AUTO-ENTMCNC: 32.9 G/DL (ref 31.5–36.5)
MCV RBC AUTO: 99 FL (ref 78–100)
MONOCYTES # BLD AUTO: 0.3 10E3/UL (ref 0–1.3)
MONOCYTES NFR BLD AUTO: 9 %
NEUTROPHILS # BLD AUTO: 2.6 10E3/UL (ref 1.6–8.3)
NEUTROPHILS NFR BLD AUTO: 74 %
NEUTS HYPERSEG BLD QL SMEAR: NORMAL
NONHDLC SERPL-MCNC: 89 MG/DL
NRBC # BLD AUTO: 0 10E3/UL
NRBC BLD AUTO-RTO: 0 /100
PLAT MORPH BLD: NORMAL
PLATELET # BLD AUTO: 45 10E3/UL (ref 150–450)
POLYCHROMASIA BLD QL SMEAR: NORMAL
POTASSIUM SERPL-SCNC: 4.2 MMOL/L (ref 3.4–5.3)
PROT SERPL-MCNC: 6.7 G/DL (ref 6.4–8.3)
RBC # BLD AUTO: 3.68 10E6/UL (ref 4.4–5.9)
RBC AGGLUT BLD QL: NORMAL
RBC MORPH BLD: NORMAL
ROULEAUX BLD QL SMEAR: NORMAL
SICKLE CELLS BLD QL SMEAR: NORMAL
SMUDGE CELLS BLD QL SMEAR: NORMAL
SODIUM SERPL-SCNC: 143 MMOL/L (ref 135–145)
SPHEROCYTES BLD QL SMEAR: NORMAL
STOMATOCYTES BLD QL SMEAR: NORMAL
T4 FREE SERPL-MCNC: 1.17 NG/DL (ref 0.9–1.7)
TARGETS BLD QL SMEAR: NORMAL
TOXIC GRANULES BLD QL SMEAR: NORMAL
TRIGL SERPL-MCNC: 154 MG/DL
TSH SERPL DL<=0.005 MIU/L-ACNC: 2.86 UIU/ML (ref 0.3–4.2)
VARIANT LYMPHS BLD QL SMEAR: NORMAL
WBC # BLD AUTO: 3.6 10E3/UL (ref 4–11)

## 2023-12-20 PROCEDURE — 36415 COLL VENOUS BLD VENIPUNCTURE: CPT | Performed by: FAMILY MEDICINE

## 2023-12-20 PROCEDURE — 80053 COMPREHEN METABOLIC PANEL: CPT | Performed by: FAMILY MEDICINE

## 2023-12-20 PROCEDURE — 83036 HEMOGLOBIN GLYCOSYLATED A1C: CPT | Performed by: FAMILY MEDICINE

## 2023-12-20 PROCEDURE — 99214 OFFICE O/P EST MOD 30 MIN: CPT | Performed by: FAMILY MEDICINE

## 2023-12-20 PROCEDURE — 80061 LIPID PANEL: CPT | Performed by: FAMILY MEDICINE

## 2023-12-20 PROCEDURE — 85025 COMPLETE CBC W/AUTO DIFF WBC: CPT | Performed by: FAMILY MEDICINE

## 2023-12-20 PROCEDURE — 84439 ASSAY OF FREE THYROXINE: CPT | Performed by: FAMILY MEDICINE

## 2023-12-20 PROCEDURE — 84443 ASSAY THYROID STIM HORMONE: CPT | Performed by: FAMILY MEDICINE

## 2023-12-20 RX ORDER — TRAMADOL HYDROCHLORIDE 50 MG/1
50 TABLET ORAL EVERY 6 HOURS PRN
Qty: 30 TABLET | Refills: 0 | Status: SHIPPED | OUTPATIENT
Start: 2023-12-20 | End: 2024-01-23

## 2023-12-20 RX ORDER — ATORVASTATIN CALCIUM 10 MG/1
10 TABLET, FILM COATED ORAL DAILY
Qty: 90 TABLET | Refills: 1 | Status: SHIPPED | OUTPATIENT
Start: 2023-12-20 | End: 2024-08-14

## 2023-12-20 RX ORDER — LEVOTHYROXINE SODIUM 25 UG/1
25 TABLET ORAL DAILY
Qty: 90 TABLET | Refills: 1 | Status: SHIPPED | OUTPATIENT
Start: 2023-12-20 | End: 2024-07-04

## 2023-12-20 RX ORDER — METOPROLOL SUCCINATE 100 MG/1
100 TABLET, EXTENDED RELEASE ORAL DAILY
Qty: 90 TABLET | Refills: 1 | Status: SHIPPED | OUTPATIENT
Start: 2023-12-20 | End: 2024-01-23

## 2023-12-20 RX ORDER — PREGABALIN 75 MG/1
75 CAPSULE ORAL 3 TIMES DAILY PRN
Qty: 90 CAPSULE | Refills: 1 | Status: SHIPPED | OUTPATIENT
Start: 2023-12-20 | End: 2024-04-30

## 2023-12-20 RX ORDER — SPIRONOLACTONE 25 MG/1
25 TABLET ORAL DAILY
Qty: 90 TABLET | Refills: 1 | Status: SHIPPED | OUTPATIENT
Start: 2023-12-20 | End: 2024-07-25

## 2023-12-20 RX ORDER — HYDROCHLOROTHIAZIDE 50 MG/1
50 TABLET ORAL DAILY
Qty: 90 TABLET | Refills: 1 | Status: SHIPPED | OUTPATIENT
Start: 2023-12-20 | End: 2024-01-23

## 2023-12-20 RX ORDER — TAMSULOSIN HYDROCHLORIDE 0.4 MG/1
0.4 CAPSULE ORAL DAILY
COMMUNITY
Start: 2023-12-20 | End: 2024-05-19

## 2023-12-20 RX ORDER — SEMAGLUTIDE 0.68 MG/ML
INJECTION, SOLUTION SUBCUTANEOUS
Qty: 3 ML | Refills: 3 | Status: SHIPPED | OUTPATIENT
Start: 2023-12-20 | End: 2024-04-30

## 2023-12-20 RX ORDER — VALSARTAN 160 MG/1
160 TABLET ORAL DAILY
Qty: 90 TABLET | Refills: 1 | Status: SHIPPED | OUTPATIENT
Start: 2023-12-20 | End: 2024-08-15

## 2023-12-20 ASSESSMENT — PAIN SCALES - GENERAL: PAINLEVEL: MODERATE PAIN (5)

## 2023-12-20 NOTE — LETTER
December 21, 2023    Canelo Logan  [id number] [facility] [address]          Dear ,    We are writing to inform you of your test results.  Platelets a bit lower than last time.  If you every start unusual bleeding, be seen promptly if symptoms acutely worsen.     Kidney test ( creatinine ) is stable.     Diabetes test ( hemoglobin a1c ) is lower than lat time.      Other labs are okay/ stable.       Resulted Orders   Lipid panel reflex to direct LDL Non-fasting   Result Value Ref Range    Cholesterol 137 <200 mg/dL    Triglycerides 154 (H) <150 mg/dL    Direct Measure HDL 48 >=40 mg/dL    LDL Cholesterol Calculated 58 <=100 mg/dL    Non HDL Cholesterol 89 <130 mg/dL    Patient Fasting > 8hrs? Yes     Narrative    Cholesterol  Desirable:  <200 mg/dL    Triglycerides  Normal:  Less than 150 mg/dL  Borderline High:  150-199 mg/dL  High:  200-499 mg/dL  Very High:  Greater than or equal to 500 mg/dL    Direct Measure HDL  Female:  Greater than or equal to 50 mg/dL   Male:  Greater than or equal to 40 mg/dL    LDL Cholesterol  Desirable:  <100mg/dL  Above Desirable:  100-129 mg/dL   Borderline High:  130-159 mg/dL   High:  160-189 mg/dL   Very High:  >= 190 mg/dL    Non HDL Cholesterol  Desirable:  130 mg/dL  Above Desirable:  130-159 mg/dL  Borderline High:  160-189 mg/dL  High:  190-219 mg/dL  Very High:  Greater than or equal to 220 mg/dL   Comprehensive metabolic panel   Result Value Ref Range    Sodium 143 135 - 145 mmol/L      Comment:      Reference intervals for this test were updated on 09/26/2023 to more accurately reflect our healthy population. There may be differences in the flagging of prior results with similar values performed with this method. Interpretation of those prior results can be made in the context of the updated reference intervals.     Potassium 4.2 3.4 - 5.3 mmol/L    Carbon Dioxide (CO2) 23 22 - 29 mmol/L    Anion Gap 10 7 - 15 mmol/L    Urea Nitrogen 24.0 (H) 8.0 - 23.0  mg/dL    Creatinine 1.26 (H) 0.67 - 1.17 mg/dL    GFR Estimate 61 >60 mL/min/1.73m2    Calcium 9.3 8.8 - 10.2 mg/dL    Chloride 110 (H) 98 - 107 mmol/L    Glucose 100 (H) 70 - 99 mg/dL    Alkaline Phosphatase 120 40 - 150 U/L      Comment:      Reference intervals for this test were updated on 11/14/2023 to more accurately reflect our healthy population. There may be differences in the flagging of prior results with similar values performed with this method. Interpretation of those prior results can be made in the context of the updated reference intervals.    AST 58 (H) 0 - 45 U/L      Comment:      Reference intervals for this test were updated on 6/12/2023 to more accurately reflect our healthy population. There may be differences in the flagging of prior results with similar values performed with this method. Interpretation of those prior results can be made in the context of the updated reference intervals.    ALT 64 0 - 70 U/L      Comment:      Reference intervals for this test were updated on 6/12/2023 to more accurately reflect our healthy population. There may be differences in the flagging of prior results with similar values performed with this method. Interpretation of those prior results can be made in the context of the updated reference intervals.      Protein Total 6.7 6.4 - 8.3 g/dL    Albumin 3.9 3.5 - 5.2 g/dL    Bilirubin Total 1.1 <=1.2 mg/dL   TSH   Result Value Ref Range    TSH 2.86 0.30 - 4.20 uIU/mL   T4 free   Result Value Ref Range    Free T4 1.17 0.90 - 1.70 ng/dL   Hemoglobin A1c   Result Value Ref Range    Hemoglobin A1C 5.3 0.0 - 5.6 %      Comment:      Normal <5.7%   Prediabetes 5.7-6.4%    Diabetes 6.5% or higher     Note: Adopted from ADA consensus guidelines.   CBC with platelets and differential   Result Value Ref Range    WBC Count 3.6 (L) 4.0 - 11.0 10e3/uL    RBC Count 3.68 (L) 4.40 - 5.90 10e6/uL    Hemoglobin 12.0 (L) 13.3 - 17.7 g/dL    Hematocrit 36.5 (L) 40.0 - 53.0 %     MCV 99 78 - 100 fL    MCH 32.6 26.5 - 33.0 pg    MCHC 32.9 31.5 - 36.5 g/dL    RDW 13.2 10.0 - 15.0 %    Platelet Count 45 (LL) 150 - 450 10e3/uL    % Neutrophils 74 %    % Lymphocytes 16 %    % Monocytes 9 %    % Eosinophils 1 %    % Basophils 0 %    % Immature Granulocytes 0 %    NRBCs per 100 WBC 0 <1 /100    Absolute Neutrophils 2.6 1.6 - 8.3 10e3/uL    Absolute Lymphocytes 0.6 (L) 0.8 - 5.3 10e3/uL    Absolute Monocytes 0.3 0.0 - 1.3 10e3/uL    Absolute Eosinophils 0.0 0.0 - 0.7 10e3/uL    Absolute Basophils 0.0 0.0 - 0.2 10e3/uL    Absolute Immature Granulocytes 0.0 <=0.4 10e3/uL    Absolute NRBCs 0.0 10e3/uL   RBC and Platelet Morphology   Result Value Ref Range    Platelet Assessment  Automated Count Confirmed. Platelet morphology is normal.     Automated Count Confirmed. Platelet morphology is normal.    Acanthocytes      Laith Rods      Basophilic Stippling      Bite Cells      Blister Cells      Troy Cells      Elliptocytes      Hgb C Crystals      Mohan-Jolly Bodies      Hypersegmented Neutrophils      Polychromasia      RBC agglutination      RBC Fragments      Reactive Lymphocytes      Rouleaux      Sickle Cells      Smudge Cells      Spherocytes      Stomatocytes      Target Cells      Teardrop Cells      Toxic Neutrophils      RBC Morphology Confirmed RBC Indices        If you have any questions or concerns, please call the clinic at the number listed above.       Sincerely,      Richi Jaramillo MD

## 2023-12-20 NOTE — PROGRESS NOTES
"Lakhwinder Lemos is a 73 year old, presenting for the following health issues:  RECHECK        12/20/2023     9:50 AM   Additional Questions   Roomed by Sofia Conde       History of Present Illness       Reason for visit:  Follow up on results, check toe    He eats 0-1 servings of fruits and vegetables daily.He consumes 0 sweetened beverage(s) daily.He exercises with enough effort to increase his heart rate 9 or less minutes per day.  He exercises with enough effort to increase his heart rate 3 or less days per week.   He is taking medications regularly.             Review of Systems   Had heart testing done [facility]    Hard to write and type          Objective    /75 (BP Location: Left arm, Patient Position: Chair, Cuff Size: Adult Large)   Pulse 87   Temp 98  F (36.7  C) (Temporal)   Resp 18   Ht 1.803 m (5' 11\")   Wt 110.3 kg (243 lb 2 oz)   SpO2 98%   BMI 33.91 kg/m    Body mass index is 33.91 kg/m .  Physical Exam  Constitutional:       Appearance: He is well-developed.   HENT:      Head: Normocephalic and atraumatic.   Eyes:      Conjunctiva/sclera: Conjunctivae normal.   Neck:      Vascular: No carotid bruit.   Cardiovascular:      Rate and Rhythm: Normal rate and regular rhythm.      Heart sounds: Normal heart sounds.   Pulmonary:      Effort: Pulmonary effort is normal. No respiratory distress.      Breath sounds: Normal breath sounds.   Neurological:      Mental Status: He is alert and oriented to person, place, and time.      Cranial Nerves: No cranial nerve deficit.   Psychiatric:         Speech: Speech normal.         Behavior: Behavior normal.      No pitting edema today     Patient has fairly limited active motion in toes of right foot  No cellulitis  He wanted me to look at 3rd toe.  He has a hard nontender callus on tip of 3rd toe            ASSESSMENT / PLAN:  (R06.09) Dyspnea on exertion  (primary encounter diagnosis)  Comment: reviewed cardiac testing.  I agree with " cardiology that he should get angiogram.  Likely anginal equivalent.   Plan: Adult Cardiology Eval  Referral             (N40.1) Benign prostatic hyperplasia with lower urinary tract symptoms, symptom details unspecified  Comment: patient now just back to one pill daily.   Plan: tamsulosin (FLOMAX) 0.4 MG capsule             (R94.39) Abnormal stress test  Comment: as above  Plan: Adult Cardiology Eval  Referral        Patient to pursue angiogram either through [facility] or [facility]    (E78.5) Hyperlipidemia LDL goal <100  Comment: refill med, check labs nonfasting  Plan: atorvastatin (LIPITOR) 10 MG tablet, Lipid         panel reflex to direct LDL Non-fasting,         Comprehensive metabolic panel             (I10) Hypertension goal BP (blood pressure) < 140/90  Comment: refill meds; blood pressure okay   Plan: hydrochlorothiazide (HYDRODIURIL) 50 MG tablet,        metoprolol succinate ER (TOPROL XL) 100 MG 24         hr tablet, spironolactone (ALDACTONE) 25 MG         tablet, valsartan (DIOVAN) 160 MG tablet             (E03.9) Hypothyroidism, unspecified type  Comment: refill but adjust med if needed   Plan: levothyroxine (SYNTHROID/LEVOTHROID) 25 MCG         tablet, TSH, T4 free             (E11.43) Type 2 diabetes mellitus with diabetic autonomic neuropathy, without long-term current use of insulin (H)  Comment: recheck   Plan: metFORMIN (GLUCOPHAGE) 500 MG tablet,         Hemoglobin A1c             (K21.9) Gastroesophageal reflux disease without esophagitis  Comment: refill   Plan: omeprazole (PRILOSEC) 20 MG DR capsule        Stable     (E11.9) Non-insulin dependent type 2 diabetes mellitus (H)  Comment:  refill   Plan: semaglutide (OZEMPIC, 0.25 OR 0.5 MG/DOSE,) 2         MG/3ML pen             (M79.2) Nerve pain  Comment:  refill   Plan: pregabalin (LYRICA) 75 MG capsule             (S22.000A) Compression fracture of thoracic vertebra, unspecified thoracic vertebral level, initial encounter  (H)  Comment: refill ; uses prn   Plan: traMADol (ULTRAM) 50 MG tablet             (D69.6) Thrombocytopenia (H24)  Comment: recheck   Plan: CBC with Platelets & Differential             (L84) Callus of foot  Comment: patient gets pedicures, can get this filed down there   Plan: as above       I reviewed the patient's medications, allergies, medical history, family history, and social history.    Richi Jaramillo MD

## 2023-12-20 NOTE — PATIENT INSTRUCTIONS
Advise seeing cardiology for angiogram    Keep working on healthy diet    We will send you lab results    Have callus on toe filed down

## 2023-12-21 NOTE — RESULT ENCOUNTER NOTE
Platelets a bit lower than last time.  If you every start unusual bleeding, be seen promptly if symptoms acutely worsen.    Kidney test ( creatinine ) is stable.    Diabetes test ( hemoglobin a1c ) is lower than lat time.      Other labs are okay/ stable.    Richi Jaramillo MD

## 2023-12-26 ENCOUNTER — TELEPHONE (OUTPATIENT)
Dept: FAMILY MEDICINE | Facility: CLINIC | Age: 73
End: 2023-12-26
Payer: COMMERCIAL

## 2023-12-26 NOTE — TELEPHONE ENCOUNTER
"RN went through provider directives. He states this is all confused because he put two notes into the provider on one encounter.    He has had an ongoing cough, with flem. The flem is yellow, green in color. This has been going on for 5-6 days. He thinks he has \"chronic bronchitis\". Denies chest pain. States does have burning, as if he has a really bad cold.     RN stated that he will need a visit to see a provider if he is wanting a medication sent to the pharmacy and attempted to reiterate Arina Renae's note. He stated he will take care of this tomorrow and abruptly ended the call.     During the call, he was hopeful to hear what Dr. Jaramillo had to say about his note on Dr. Killian. Routing to advise on previous note from RN.     Myrna Garrett RN on 12/26/2023 at 3:13 PM                 "

## 2023-12-26 NOTE — TELEPHONE ENCOUNTER
Reason for Call:  Other call back    Detailed comments: PATIENT WOULD LIKE A CALL BACK ABOUT SETTING UP CARDIOLOGY, IF HE REALLY NEEDS A CONSULT FIRST OR CAN HE JUST HAVE AN ANGIOGRAM SET UP    Phone Number Patient can be reached at: Cell number on file:    Telephone Information:   Mobile [phone]       Best Time: ANYTIME    Can we leave a detailed message on this number? NO    Call taken on 12/26/2023 at 11:38 AM by Tahmina Frederick

## 2023-12-26 NOTE — TELEPHONE ENCOUNTER
Left message on answering machine for patient to call back to the nurse at [phone].    Need to inform pt when he returns call that Dr. Jaramillo is out of office and covering provider advised:    He would need an evaluation. Dr. Jaramillo did not feel that his shortness of breath was related to an infectious issue but rather his heart.   Arina Nair RN  [facility]

## 2023-12-26 NOTE — TELEPHONE ENCOUNTER
"Spoke with pt. States he saw Dr. Jaramillo on 12/20 and was already coughing at this visit. Cough has progressed with greenish yellow phlegm. Has burning in his right lung. Is noticing more often a lack of breath. For example has run out of breath 4 times already today since 0930. Feels like bronchitis in his chest. Is susceptible to this. Currently has had some issues with breathing-dyspnea and this is not helping. Pt was advised to be seen in UC or ER and pt declined. States he is \"old school\" and doesn't think this warrants an UC or ER visit. Pt requesting a z yue be sent to [facility] in [place].    States he wanted to stay in network with cardiology for micro angiogram for potential stents. Has already seen Dr. Killian in [facility]. When he contacted [facility] he was advised he would need a consult for this. Can angiogram be ordered? States he might have to stay with Dr. Killian. JOAN Jaramillo thinks he can do anything for this.     Katie Nair RN  [facility] [place]    "

## 2023-12-26 NOTE — TELEPHONE ENCOUNTER
Reason for Call:  Other call back    Detailed comments: patient came into the clinic today and wanted to leave Dr Jaramillo a message, Canelo would like a call back from either the nurse or Dr Jaramillo because patient states he needs amoxicillin again so if someone could call patient back and advise him in what he should do or that a script has been sent to [facility] in [place], you may ask for address from patient when calling    Phone Number Patient can be reached at: Cell number on file:    Telephone Information:   Mobile [phone]       Best Time: anytime    Can we leave a detailed message on this number? NO    Call taken on 12/26/2023 at 11:26 AM by Tahmina Frederick

## 2023-12-26 NOTE — TELEPHONE ENCOUNTER
He would need an evaluation. Dr. Jaramillo did not feel that his shortness of breath was related to an infectious issue but rather his heart.   Arina Renae PA-C

## 2024-01-02 ENCOUNTER — TELEPHONE (OUTPATIENT)
Dept: FAMILY MEDICINE | Facility: CLINIC | Age: 74
End: 2024-01-02
Payer: COMMERCIAL

## 2024-01-02 NOTE — TELEPHONE ENCOUNTER
I have approval req slots available on Friday Jan 5, not on Thursday    Please inform patient and schedule if doable for patient     Thanks    Richi Jaramillo MD

## 2024-01-02 NOTE — TELEPHONE ENCOUNTER
Called patient  Appointment scheduled with Dr. Jaramillo for 1/5/2024    Sigifredo Hinds RN  [facility]

## 2024-01-02 NOTE — TELEPHONE ENCOUNTER
Reason for Call:  Appointment Request    Patient requesting this type of appt: Chronic Diease Management/Medication/Follow-Up    Requested provider: Richi Jaramillo    Reason patient unable to be scheduled: Not within requested timeframe    When does patient want to be seen/preferred time: 1-2 days    Comments: Patient would like to be seen on Thursday January 4th when he comes into town to see an ENT. Patient is experiencing shortness of breath     Okay to leave a detailed message?: Yes at Cell number on file:    Telephone Information:   Mobile [phone]       Call taken on 1/2/2024 at 8:40 AM by Tiffany Moran

## 2024-01-02 NOTE — TELEPHONE ENCOUNTER
Tiffany Moran     TB    1/2/24  8:40 AM  Note     Reason for Call:  Appointment Request     Patient requesting this type of appt: Chronic Diease Management/Medication/Follow-Up     Requested provider: Richi Jaramillo     Reason patient unable to be scheduled: Not within requested timeframe     When does patient want to be seen/preferred time: 1-2 days     Comments: Patient would like to be seen on Thursday January 4th when he comes into town to see an ENT. Patient is experiencing shortness of breath      Okay to leave a detailed message?: Yes at Cell number on file:        Telephone Information:   Mobile [phone]         Call taken on 1/2/2024 at 8:40 AM by Tiffany Moran

## 2024-01-04 ENCOUNTER — TRANSFERRED RECORDS (OUTPATIENT)
Dept: HEALTH INFORMATION MANAGEMENT | Facility: CLINIC | Age: 74
End: 2024-01-04
Payer: COMMERCIAL

## 2024-01-05 ENCOUNTER — OFFICE VISIT (OUTPATIENT)
Dept: FAMILY MEDICINE | Facility: CLINIC | Age: 74
End: 2024-01-05
Payer: COMMERCIAL

## 2024-01-05 VITALS
OXYGEN SATURATION: 98 % | RESPIRATION RATE: 16 BRPM | TEMPERATURE: 97.6 F | SYSTOLIC BLOOD PRESSURE: 155 MMHG | BODY MASS INDEX: 33.35 KG/M2 | DIASTOLIC BLOOD PRESSURE: 82 MMHG | HEIGHT: 71 IN | WEIGHT: 238.25 LBS | HEART RATE: 64 BPM

## 2024-01-05 DIAGNOSIS — H91.93 HIGH FREQUENCY HEARING LOSS OF BOTH EARS: ICD-10-CM

## 2024-01-05 DIAGNOSIS — M79.2 NERVE PAIN: ICD-10-CM

## 2024-01-05 DIAGNOSIS — J01.90 ACUTE SINUSITIS WITH SYMPTOMS > 10 DAYS: Primary | ICD-10-CM

## 2024-01-05 DIAGNOSIS — R11.0 NAUSEA: ICD-10-CM

## 2024-01-05 DIAGNOSIS — I10 HYPERTENSION GOAL BP (BLOOD PRESSURE) < 140/90: ICD-10-CM

## 2024-01-05 DIAGNOSIS — R06.00 DYSPNEA, UNSPECIFIED TYPE: ICD-10-CM

## 2024-01-05 DIAGNOSIS — E66.9 OBESITY, UNSPECIFIED OBESITY SEVERITY, UNSPECIFIED OBESITY TYPE: ICD-10-CM

## 2024-01-05 PROCEDURE — 99214 OFFICE O/P EST MOD 30 MIN: CPT | Performed by: FAMILY MEDICINE

## 2024-01-05 RX ORDER — AZITHROMYCIN 250 MG/1
TABLET, FILM COATED ORAL
Qty: 6 TABLET | Refills: 0 | Status: SHIPPED | OUTPATIENT
Start: 2024-01-05 | End: 2024-01-10

## 2024-01-05 ASSESSMENT — PAIN SCALES - GENERAL: PAINLEVEL: MODERATE PAIN (5)
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
## 2024-01-05 NOTE — PATIENT INSTRUCTIONS
Use tramadol sparingly    Could increase use of pregabalin ( lyrica ) for nerve pain    Use tizanidine for muscle pain/ spasm    Increase exercise as able    Agree with seeing cardiology as planned

## 2024-01-08 ENCOUNTER — TELEPHONE (OUTPATIENT)
Dept: FAMILY MEDICINE | Facility: CLINIC | Age: 74
End: 2024-01-08

## 2024-01-08 NOTE — TELEPHONE ENCOUNTER
Please print copy of lab results from 12/20/23 from Dr. Jaramillo and place in an envelope at . Please notify patient when ready for  [phone].    Pt is calling stating that he would like to come to  a copy of his lab results at the clinic. He states that he is currently at the cardiology clinic. Notified him that writer can request a copy be placed at the . He then stated that he he would also like results faxed and just needs to find a nurse. Writer then heard someone in the background notifying patient that he needs to sign a release for Care Everywhere and then the call was ended. Unsure if patient still needing results. Called back. Pt still would like to  a copy of lab results at clinic.    Margarette Crwoell RN

## 2024-01-08 NOTE — TELEPHONE ENCOUNTER
Reason for Call:  Other call back    Detailed comments: patient saw Dr Killian/cardiologist, he is redoing blood pressure medication, he will advise you may need two years of blood test for him, scheduling angiogram in the end of Jan. Send note to Constantin with any questions.    Phone Number Patient can be reached at: Cell number on file:    Telephone Information:   Mobile [phone]       Best Time: anytime    Can we leave a detailed message on this number? YES    Call taken on 1/8/2024 at 3:08 PM by Tahmina Frederick

## 2024-01-08 NOTE — LETTER
January 9, 2024      Canelo Logan  [address] [facility] [address]        Dear ,    We are writing to inform you of your test results.        Component      Latest Ref Rng 12/20/2023  10:28 AM   Platelet Morphology      Automated Count Confirmed. Platelet morphology is normal.  Automated Count Confirmed. Platelet morphology is normal.    RBC Morphology Confirmed RBC Indices    WBC      4.0 - 11.0 10e3/uL 3.6 (L)    RBC Count      4.40 - 5.90 10e6/uL 3.68 (L)    Hemoglobin      13.3 - 17.7 g/dL 12.0 (L)    Hematocrit      40.0 - 53.0 % 36.5 (L)    MCV      78 - 100 fL 99    MCH      26.5 - 33.0 pg 32.6    MCHC      31.5 - 36.5 g/dL 32.9    RDW      10.0 - 15.0 % 13.2    Platelet Count      150 - 450 10e3/uL 45 (LL)    % Neutrophils      % 74    % Lymphocytes      % 16    % Monocytes      % 9    % Eosinophils      % 1    % Basophils      % 0    % Immature Granulocytes      % 0    NRBCs per 100 WBC      <1 /100 0    Absolute Neutrophils      1.6 - 8.3 10e3/uL 2.6    Absolute Lymphocytes      0.8 - 5.3 10e3/uL 0.6 (L)    Absolute Monocytes      0.0 - 1.3 10e3/uL 0.3    Absolute Eosinophils      0.0 - 0.7 10e3/uL 0.0    Absolute Basophils      0.0 - 0.2 10e3/uL 0.0    Absolute Immature Granulocytes      <=0.4 10e3/uL 0.0    Absolute NRBCs      10e3/uL 0.0    Sodium      135 - 145 mmol/L 143    Potassium      3.4 - 5.3 mmol/L 4.2    Carbon Dioxide (CO2)      22 - 29 mmol/L 23    Anion Gap      7 - 15 mmol/L 10    Urea Nitrogen      8.0 - 23.0 mg/dL 24.0 (H)    Creatinine      0.67 - 1.17 mg/dL 1.26 (H)    GFR Estimate      >60 mL/min/1.73m2 61    Calcium      8.8 - 10.2 mg/dL 9.3    Chloride      98 - 107 mmol/L 110 (H)    Glucose      70 - 99 mg/dL 100 (H)    Alkaline Phosphatase      40 - 150 U/L 120    AST      0 - 45 U/L 58 (H)    ALT      0 - 70 U/L 64    Protein Total      6.4 - 8.3 g/dL 6.7    Albumin      3.5 - 5.2 g/dL 3.9    Bilirubin Total      <=1.2 mg/dL 1.1    Cholesterol      <200  mg/dL 137    Triglycerides      <150 mg/dL 154 (H)    HDL Cholesterol      >=40 mg/dL 48    LDL Cholesterol Calculated      <=100 mg/dL 58    Non HDL Cholesterol      <130 mg/dL 89    Patient Fasting? Yes    TSH      0.30 - 4.20 uIU/mL 2.86    T4 Free      0.90 - 1.70 ng/dL 1.17    Hemoglobin A1C      0.0 - 5.6 % 5.3       Legend:  (L) Low  (LL) Low Panic  (H) High    If you have any questions or concerns, please call the clinic at the number listed above.       Sincerely,

## 2024-01-17 ENCOUNTER — MEDICAL CORRESPONDENCE (OUTPATIENT)
Dept: HEALTH INFORMATION MANAGEMENT | Facility: CLINIC | Age: 74
End: 2024-01-17
Payer: COMMERCIAL

## 2024-01-22 ENCOUNTER — TRANSFERRED RECORDS (OUTPATIENT)
Dept: HEALTH INFORMATION MANAGEMENT | Facility: CLINIC | Age: 74
End: 2024-01-22
Payer: COMMERCIAL

## 2024-01-22 ENCOUNTER — TELEPHONE (OUTPATIENT)
Dept: FAMILY MEDICINE | Facility: CLINIC | Age: 74
End: 2024-01-22
Payer: COMMERCIAL

## 2024-01-22 DIAGNOSIS — R42 DIZZINESS: Primary | ICD-10-CM

## 2024-01-22 NOTE — TELEPHONE ENCOUNTER
Patient calling, notes that he has a PT eval and treat appointment today at [facility]. Notes that they will be treating him for his balance issues that he has seen Dr. Richi Jaramillo for previously, notes that his copay is better [facility] and is closer to home.    Patient notes that he has his appointment today 01/22/24 at 1530 and is in need of referral before then.    Please route to team high priority for faxing of orders.    [facility] (any questions ask for Tiffany)  #: [phone]  Fax#: [phone]      FYI- Patient has appointment with PCP tomorrow 1/23/24 to discuss angiogram/heart monitor results and follow-up. Tests were completed through [facility], patient was instructed to reach out to cardiology team and ask for results to be faxed to PCP for viewing at appointment tomorrow 1/23/24, writer gave patient blue team fax# to have cardiology send these to. Patient wanted [facility] care team to reach out for results, writer noted that this must be initiated by patient.    Routing to PCP for PT referral signing.    Thanks,  PETER SzymanskiN RN  [facility]

## 2024-01-23 ENCOUNTER — OFFICE VISIT (OUTPATIENT)
Dept: FAMILY MEDICINE | Facility: CLINIC | Age: 74
End: 2024-01-23
Payer: COMMERCIAL

## 2024-01-23 ENCOUNTER — TELEPHONE (OUTPATIENT)
Dept: FAMILY MEDICINE | Facility: CLINIC | Age: 74
End: 2024-01-23

## 2024-01-23 VITALS
RESPIRATION RATE: 18 BRPM | DIASTOLIC BLOOD PRESSURE: 70 MMHG | SYSTOLIC BLOOD PRESSURE: 152 MMHG | BODY MASS INDEX: 34.18 KG/M2 | HEIGHT: 71 IN | OXYGEN SATURATION: 98 % | HEART RATE: 71 BPM | TEMPERATURE: 98.2 F | WEIGHT: 244.13 LBS

## 2024-01-23 DIAGNOSIS — S22.000A COMPRESSION FRACTURE OF THORACIC VERTEBRA, UNSPECIFIED THORACIC VERTEBRAL LEVEL, INITIAL ENCOUNTER (H): ICD-10-CM

## 2024-01-23 DIAGNOSIS — I10 HYPERTENSION GOAL BP (BLOOD PRESSURE) < 140/90: ICD-10-CM

## 2024-01-23 DIAGNOSIS — E11.43 TYPE 2 DIABETES MELLITUS WITH DIABETIC AUTONOMIC NEUROPATHY, WITHOUT LONG-TERM CURRENT USE OF INSULIN (H): ICD-10-CM

## 2024-01-23 DIAGNOSIS — N18.30 STAGE 3 CHRONIC KIDNEY DISEASE, UNSPECIFIED WHETHER STAGE 3A OR 3B CKD (H): ICD-10-CM

## 2024-01-23 DIAGNOSIS — Z87.81 HISTORY OF COMPRESSION FRACTURE OF SPINE: ICD-10-CM

## 2024-01-23 DIAGNOSIS — R06.00 DYSPNEA, UNSPECIFIED TYPE: Primary | ICD-10-CM

## 2024-01-23 DIAGNOSIS — D69.6 THROMBOCYTOPENIA (H): ICD-10-CM

## 2024-01-23 DIAGNOSIS — I51.7 LEFT VENTRICULAR HYPERTROPHY: ICD-10-CM

## 2024-01-23 PROBLEM — E66.01 MORBID OBESITY (H): Status: RESOLVED | Noted: 2018-11-09 | Resolved: 2024-01-23

## 2024-01-23 PROBLEM — K74.69 OTHER CIRRHOSIS OF LIVER (H): Chronic | Status: RESOLVED | Noted: 2020-02-20 | Resolved: 2024-01-23

## 2024-01-23 PROCEDURE — 99214 OFFICE O/P EST MOD 30 MIN: CPT | Performed by: FAMILY MEDICINE

## 2024-01-23 RX ORDER — HYDROCHLOROTHIAZIDE 12.5 MG/1
12.5 TABLET ORAL DAILY
Qty: 90 TABLET | Refills: 1 | Status: SHIPPED | OUTPATIENT
Start: 2024-01-23 | End: 2024-07-25

## 2024-01-23 RX ORDER — TRAMADOL HYDROCHLORIDE 50 MG/1
50 TABLET ORAL EVERY 6 HOURS PRN
Qty: 30 TABLET | Refills: 0 | Status: SHIPPED | OUTPATIENT
Start: 2024-01-23 | End: 2024-03-05

## 2024-01-23 RX ORDER — METOPROLOL SUCCINATE 100 MG/1
TABLET, EXTENDED RELEASE ORAL
Qty: 135 TABLET | Refills: 1 | Status: SHIPPED | OUTPATIENT
Start: 2024-01-23 | End: 2024-08-02

## 2024-01-23 ASSESSMENT — PAIN SCALES - GENERAL: PAINLEVEL: MODERATE PAIN (5)

## 2024-01-23 NOTE — TELEPHONE ENCOUNTER
Pt calling to state that insurance was switched and they are requiring forms to be completed for pt to receive Ozempic. [employer] [place] forms to pcp to fill out.     Pt is needing this signed by Thursday as he is due for Ozempic.    Routing to team to retreive fax and place in pcp's basket.     Glendy Bonilla RN on 1/23/2024 at 5:04 PM

## 2024-01-23 NOTE — PROGRESS NOTES
"      Lakhwinder Lemos is a 73 year old, presenting for the following health issues:  Results        1/23/2024    10:00 AM   Additional Questions   Roomed by Sofia Conde     History of Present Illness       Reason for visit:  Follow up on angio results    He eats 4 or more servings of fruits and vegetables daily.He consumes 0 sweetened beverage(s) daily.He exercises with enough effort to increase his heart rate 9 or less minutes per day.  He exercises with enough effort to increase his heart rate 3 or less days per week.   He is taking medications regularly.         Patient had angiogram at [facility]    Patient occasionally gets bad episode  of the thoracic pain        Objective    BP (!) 172/63 (BP Location: Right arm, Patient Position: Chair, Cuff Size: Adult Regular)   Pulse 71   Temp 98.2  F (36.8  C) (Temporal)   Resp 18   Ht 1.803 m (5' 11\")   Wt 110.7 kg (244 lb 2 oz)   SpO2 98%   BMI 34.05 kg/m    Body mass index is 34.05 kg/m .  Physical Exam  Constitutional:       Appearance: He is well-developed.   HENT:      Head: Normocephalic and atraumatic.   Eyes:      Conjunctiva/sclera: Conjunctivae normal.   Neck:      Vascular: No carotid bruit.   Cardiovascular:      Rate and Rhythm: Normal rate and regular rhythm.      Heart sounds: Normal heart sounds.   Pulmonary:      Effort: Pulmonary effort is normal. No respiratory distress.      Breath sounds: Normal breath sounds.   Neurological:      Mental Status: He is alert and oriented to person, place, and time.      Cranial Nerves: No cranial nerve deficit.   Psychiatric:         Speech: Speech normal.         Behavior: Behavior normal.        No pitting edema    Radials symmetric    A bit of bruising right wrist and antecubital fossa right arm, not worrisome    Reviewed angiogram results in detail    Reviewed labs     ASSESSMENT / PLAN:  (R06.00) Dyspnea, unspecified type  (primary encounter diagnosis)  Comment: multifactorial, but good that angiogram is " normal  Plan: encouraged patient to increase activity/ exercise as able     (I10) Hypertension goal BP (blood pressure) < 140/90  Comment: new doses clarified.  Blood pressure okay  Plan: hydrochlorothiazide (HYDRODIURIL) 12.5 MG         tablet, metoprolol succinate ER (TOPROL XL) 100        MG 24 hr tablet             (Z87.81) History of compression fracture of spine  Comment: occasional bad pain  Plan: as above     (S22.000A) Compression fracture of thoracic vertebra, unspecified thoracic vertebral level, initial encounter (H)  Comment: refill   Plan: traMADol (ULTRAM) 50 MG tablet        Use prn     (N18.30) Stage 3 chronic kidney disease, unspecified whether stage 3a or 3b CKD (H)  Comment: fairly stable  Plan: as above    (D69.6) Thrombocytopenia (H24)  Comment: chronic   Plan: monitor    (E11.43) Type 2 diabetes mellitus with diabetic autonomic neuropathy, without long-term current use of insulin (H)  Comment: last hemoglobin a1c fine  Plan: stay on ozempic and other meds for now     (I51.7) Left ventricular hypertrophy  Comment: discussed  Plan: stay  active ; blood pressure well controlled      I reviewed the patient's medications, allergies, medical history, family history, and social history.    Richi Jaramillo MD              Signed Electronically by: Richi Jaramillo MD

## 2024-01-25 ENCOUNTER — TRANSFERRED RECORDS (OUTPATIENT)
Dept: HEALTH INFORMATION MANAGEMENT | Facility: CLINIC | Age: 74
End: 2024-01-25
Payer: COMMERCIAL

## 2024-01-25 ENCOUNTER — TELEPHONE (OUTPATIENT)
Dept: FAMILY MEDICINE | Facility: CLINIC | Age: 74
End: 2024-01-25
Payer: COMMERCIAL

## 2024-01-25 NOTE — TELEPHONE ENCOUNTER
I did not see forms before I left.  I will look for them on Monday when I am back in clinic  Richi Jaramillo MD

## 2024-01-29 ENCOUNTER — TELEPHONE (OUTPATIENT)
Dept: FAMILY MEDICINE | Facility: CLINIC | Age: 74
End: 2024-01-29
Payer: COMMERCIAL

## 2024-01-29 NOTE — TELEPHONE ENCOUNTER
Was seen at  Orthopedics this morning for concerns of wrist and hip issues. He is calling to inform Dr. Jaramillo of updates from his visit.     Patient had x-rays completed and results show that his hip could have bursitis or some sort of tendonitis. Providers had recommended PT, which patient plans to do at [facility], which is also where patient seeks care regarding balance issues.     Patient's dysgraphia wrist was also evaluated, and provider advised to ease the consistent pain, patient to wear a brace to immobilize the right wrist. He will be picking up brace to assist with immobilizing.    Patient notes he is still trying to figure out why he is running short of breath, he recently was at [facility] getting some procedures done which proved not to be beneficial. He is hoping, sooner or later, that there is an idea on what to be done. Noted patient was in OV on 1/23. He explains that the SOB comes and goes whenever. Patient still notices palpitations in heart area in various times, not when active, only when active. He states that cardiologist was stumped and unsure of next steps.     Routing to provider as FYI, and for any further advice regarding patient's situation.    PETER BrownN CASANDRA  [facility]

## 2024-02-07 NOTE — TELEPHONE ENCOUNTER
Central Prior Authorization Team   Phone: [phone]    PA Initiation    Medication: Ozempic (0.25&0.5) 2mg/3ml pen  Insurance Company: [employer] ([facility]) - Phone [phone] Fax [phone]  Pharmacy Filling the Rx: [facility] [address] [place], [place] [address] [address] [place] [address], [place]  Filling Pharmacy Phone: [phone]  Filling Pharmacy Fax:    Start Date: 2/6/2024          
Prior Authorization Approval    Authorization Effective Date: 2/6/2024  Authorization Expiration Date: 12/31/2024  Medication: Ozempic (0.25&0.5) 2mg/3ml pen  Approved Dose/Quantity:    Reference #:     Insurance Company: [employer] ([facility]) - Phone [phone] Fax [phone]  Expected CoPay:       CoPay Card Available:      Foundation Assistance Needed:    Which Pharmacy is filling the prescription (Not needed for infusion/clinic administered): [facility] [address] [place] [address], [place] [address] [facility] [address]  Pharmacy Notified:  Yes  Patient Notified:  **Instructed pharmacy to notify patient when script is ready to /ship.**          
Prior Authorization Retail Medication Request    Medication/Dose: Ozempic (0.25&0.5) 2mg/3ml pen  Diagnosis and ICD code (if different than what is on RX):    New/renewal/insurance change PA/secondary ins. PA:  Previously Tried and Failed:    Rationale:      Insurance   Primary:   Insurance ID:      Secondary (if applicable):  Insurance ID:      Pharmacy Information (if different than what is on RX)  Name:  [facility]  Phone:  [phone]  Fax:[phone]    [facility]      
no

## 2024-02-13 ENCOUNTER — NURSE TRIAGE (OUTPATIENT)
Dept: FAMILY MEDICINE | Facility: CLINIC | Age: 74
End: 2024-02-13
Payer: COMMERCIAL

## 2024-02-13 NOTE — TELEPHONE ENCOUNTER
RN left  requesting call back to clinic.    RN called to assist in scheduling.    Tariq Avendaño RN, BSN, PHN  [facility]

## 2024-02-13 NOTE — TELEPHONE ENCOUNTER
Agree with RN advice    Patient could do visit with me in next 1-2 weeks if needed, yoly Jaramillo MD

## 2024-02-13 NOTE — TELEPHONE ENCOUNTER
"  It was extremely difficult to triage the patient as he would not answer this RNs triage questions directly.     Nurse Triage SBAR    Is this a 2nd Level Triage? YES, LICENSED PRACTITIONER REVIEW IS REQUIRED    Situation: patient complains of ongoing dehydration.     Background:      Assessment: 2/9/24 stated noticed \"dehydration\", due to a severe case of diarrhea. It continued through Saturday. Sunday he could not \"get warm\". He was shivering while wearing stocking hat, two layers of clothing and under covers. The room was at 80 degrees. Reports having a fever, does not state what it was. Monday this severe case of chills happened again. He did not drink much through this time. He stated he felt confused, he did not present to the urgent care because he did not have a ride.     Today he forced himself to eat, but is feeling nauseous. He still has diarrhea, but declines having any episodes today. He has been trying to drink Gatorade but does not want to keep drinking because the diarrhea is bad. He does not have a thermometer near him to take his temp. He still has some chills but not as severe as the previous two days. He had taken 600 mg Ibuprofen on Sunday and Monday.     He took azithrothmycin that he had \"laying around\". RN discouraged patient from taking this, if he was not prescribed for this illness, as it may not be helpful.     He has drank about 8 oz of coffee and about 8 oz of water today =16 oz today.  He reports 16 oz water yesterday, along with 16 oz Gatorade in the past 24 hours. He has been urinating. When RN attempted to ask about chest pain, patient was thoughtful upon choosing his words.  Stated he felt like his heart was vibrating on Friday- Sunday. This has since resolved. He described pain that was toward chest pain but declined having chest pain. States he has pain around his gallbladder 4/10. This pain started on Friday. RN educated if he has any type of chest pain, that he should dial 911 " at any time.    Protocol Recommended Disposition:   Go To ED/UCC Now (Or To Office With PCP Approval)    Recommendation: Encouraged to be seen in ER due to dehydration concerns and ongoing fever.  He stated he will wait to see if he has a loose bowel movement in the next hour or so, and if it is not loose, then he will treat this on his own.  If it is loose, he will be seen in the ER.     RN did education on emergency symptoms, when to seek more urgent care.      Routed to provider    Does the patient meet one of the following criteria for ADS visit consideration? 16+ years old, with an [facility] PCP     TIP  Providers, please consider if this condition is appropriate for management at one of our Acute and Diagnostic Services sites.     If patient is a good candidate, please use dotphrase <dot>triageresponse and select Refer to ADS to document.           Reason for Disposition   Constant abdominal pain lasting > 2 hours    Additional Information   Negative: Shock suspected (e.g., cold/pale/clammy skin, too weak to stand, low BP, rapid pulse)   Negative: Difficult to awaken or acting confused (e.g., disoriented, slurred speech)   Negative: Sounds like a life-threatening emergency to the triager   Negative: Vomiting also present and worse than the diarrhea   Negative: Blood in stool and without diarrhea   Negative: SEVERE abdominal pain (e.g., excruciating) and present > 1 hour   Negative: SEVERE abdominal pain and age > 60 years   Negative: Bloody, black, or tarry bowel movements  (Exception: Chronic-unchanged black-grey bowel movements and is taking iron pills or Pepto-Bismol.)   Negative: SEVERE diarrhea (e.g., 7 or more times / day more than normal) and age > 60 years    Protocols used: Diarrhea-A-OH

## 2024-02-15 ENCOUNTER — VIRTUAL VISIT (OUTPATIENT)
Dept: FAMILY MEDICINE | Facility: CLINIC | Age: 74
End: 2024-02-15
Payer: COMMERCIAL

## 2024-02-15 DIAGNOSIS — R19.7 DIARRHEA, UNSPECIFIED TYPE: ICD-10-CM

## 2024-02-15 DIAGNOSIS — E11.40 TYPE 2 DIABETES MELLITUS WITH DIABETIC NEUROPATHY, WITHOUT LONG-TERM CURRENT USE OF INSULIN (H): ICD-10-CM

## 2024-02-15 DIAGNOSIS — R53.83 FATIGUE, UNSPECIFIED TYPE: ICD-10-CM

## 2024-02-15 DIAGNOSIS — E55.9 VITAMIN D DEFICIENCY DISEASE: ICD-10-CM

## 2024-02-15 DIAGNOSIS — I10 HYPERTENSION GOAL BP (BLOOD PRESSURE) < 140/90: ICD-10-CM

## 2024-02-15 DIAGNOSIS — E86.0 DEHYDRATION: Primary | ICD-10-CM

## 2024-02-15 PROCEDURE — 99443 PR PHYSICIAN TELEPHONE EVALUATION 21-30 MIN: CPT | Mod: 93 | Performed by: FAMILY MEDICINE

## 2024-02-15 NOTE — TELEPHONE ENCOUNTER
Note that patient is scheduled for 02/15/24 with provider. Pt has already completed telephone visit.     Margarette Crowell RN

## 2024-02-15 NOTE — PROGRESS NOTES
Canelo is a 73 year old who is being evaluated via a billable telephone visit.      What phone number would you like to be contacted at? [phone]  How would you like to obtain your AVS? Mail a copy    Distant Location (provider location):  On-site        Subjective   Canelo is a 73 year old, presenting for the following health issues:  RECHECK        2/15/2024     9:42 AM   Additional Questions   Roomed by Sofia Conde     History of Present Illness       Reason for visit:  Recheck    He eats 0-1 servings of fruits and vegetables daily.He consumes 0 sweetened beverage(s) daily.He exercises with enough effort to increase his heart rate 9 or less minutes per day.  He exercises with enough effort to increase his heart rate 3 or less days per week.   He is taking medications regularly.       Sweats  Nausea  Can't get warm    Lethargic    Solid stool started this am    Some pain near gall bladder     3 days of diarrhea, got better than worse for a couple days    Headache     Nausea especially in am    Drinking fluids better now  Gatorade etc    Urinating okay     Stopped D3    Off ozempic for 3 weeks    On for several months     0.5 mg     No problems / side effects on ozempic    Went to [facility] for the hip              Objective           Vitals:  No vitals were obtained today due to virtual visit.    Physical Exam   General: Alert and no distress //Respiratory: No audible wheeze, cough, or shortness of breath // Psychiatric:  Appropriate affect, tone, and pace of words    ASSESSMENT / PLAN:  (E86.0) Dehydration  (primary encounter diagnosis)  Comment: discussed in detail. Patient is improved now.   Plan: discussed importanc of hydration etc. If symptoms ever get real bad, be seen.     (E55.9) Vitamin D deficiency disease  Comment: check level  Plan: Vitamin D Deficiency        Patient to schedule lab appointment     (E11.40) Type 2 diabetes mellitus with diabetic neuropathy, without long-term current use of insulin  (H)  Comment:  patient off ozempic for 3 weeks. Could consider going down to 1 daily.    Plan: Hemoglobin A1c        Check labs.     (R53.83) Fatigue, unspecified type  Comment: check   Plan: Comprehensive metabolic panel, CBC with         Platelets & Differential             (R19.7) Diarrhea, unspecified type  Comment: improving   Plan: follow up prn symptoms     (I10) Hypertension goal BP (blood pressure) < 140/90  Comment: I did  clarify with patient that when he gets dehydrated symptoms will likely be worse since he is  also on several blood pressure lowering medications   Plan: as above       I reviewed the patient's medications, allergies, medical history, family history, and social history.    Richi Jaramillo MD           Phone call duration: 26  minutes ( 9:53 to 10:19 am )  Signed Electronically by: Richi Jaramillo MD

## 2024-02-20 ENCOUNTER — LAB (OUTPATIENT)
Dept: LAB | Facility: CLINIC | Age: 74
End: 2024-02-20
Payer: COMMERCIAL

## 2024-02-20 DIAGNOSIS — E55.9 VITAMIN D DEFICIENCY DISEASE: ICD-10-CM

## 2024-02-20 DIAGNOSIS — E11.40 TYPE 2 DIABETES MELLITUS WITH DIABETIC NEUROPATHY, WITHOUT LONG-TERM CURRENT USE OF INSULIN (H): ICD-10-CM

## 2024-02-20 DIAGNOSIS — R53.83 FATIGUE, UNSPECIFIED TYPE: ICD-10-CM

## 2024-02-20 LAB
ALBUMIN SERPL BCG-MCNC: 3.7 G/DL (ref 3.5–5.2)
ALP SERPL-CCNC: 181 U/L (ref 40–150)
ALT SERPL W P-5'-P-CCNC: 59 U/L (ref 0–70)
ANION GAP SERPL CALCULATED.3IONS-SCNC: 10 MMOL/L (ref 7–15)
AST SERPL W P-5'-P-CCNC: 43 U/L (ref 0–45)
BASOPHILS # BLD AUTO: 0 10E3/UL (ref 0–0.2)
BASOPHILS NFR BLD AUTO: 1 %
BILIRUB SERPL-MCNC: 0.8 MG/DL
BUN SERPL-MCNC: 24.5 MG/DL (ref 8–23)
CALCIUM SERPL-MCNC: 8.7 MG/DL (ref 8.8–10.2)
CHLORIDE SERPL-SCNC: 111 MMOL/L (ref 98–107)
CREAT SERPL-MCNC: 1.24 MG/DL (ref 0.67–1.17)
DEPRECATED HCO3 PLAS-SCNC: 22 MMOL/L (ref 22–29)
EGFRCR SERPLBLD CKD-EPI 2021: 62 ML/MIN/1.73M2
EOSINOPHIL # BLD AUTO: 0 10E3/UL (ref 0–0.7)
EOSINOPHIL NFR BLD AUTO: 1 %
ERYTHROCYTE [DISTWIDTH] IN BLOOD BY AUTOMATED COUNT: 13.2 % (ref 10–15)
GLUCOSE SERPL-MCNC: 160 MG/DL (ref 70–99)
HBA1C MFR BLD: 5.5 % (ref 0–5.6)
HCT VFR BLD AUTO: 34.8 % (ref 40–53)
HGB BLD-MCNC: 10.9 G/DL (ref 13.3–17.7)
IMM GRANULOCYTES # BLD: 0 10E3/UL
IMM GRANULOCYTES NFR BLD: 1 %
LYMPHOCYTES # BLD AUTO: 0.6 10E3/UL (ref 0.8–5.3)
LYMPHOCYTES NFR BLD AUTO: 18 %
MCH RBC QN AUTO: 32.2 PG (ref 26.5–33)
MCHC RBC AUTO-ENTMCNC: 31.3 G/DL (ref 31.5–36.5)
MCV RBC AUTO: 103 FL (ref 78–100)
MONOCYTES # BLD AUTO: 0.3 10E3/UL (ref 0–1.3)
MONOCYTES NFR BLD AUTO: 10 %
NEUTROPHILS # BLD AUTO: 2.1 10E3/UL (ref 1.6–8.3)
NEUTROPHILS NFR BLD AUTO: 69 %
NRBC # BLD AUTO: 0 10E3/UL
NRBC BLD AUTO-RTO: 0 /100
PLATELET # BLD AUTO: 64 10E3/UL (ref 150–450)
POTASSIUM SERPL-SCNC: 4.4 MMOL/L (ref 3.4–5.3)
PROT SERPL-MCNC: 6.7 G/DL (ref 6.4–8.3)
RBC # BLD AUTO: 3.39 10E6/UL (ref 4.4–5.9)
SODIUM SERPL-SCNC: 143 MMOL/L (ref 135–145)
VIT D+METAB SERPL-MCNC: 71 NG/ML (ref 20–50)
WBC # BLD AUTO: 3 10E3/UL (ref 4–11)

## 2024-02-20 PROCEDURE — 36415 COLL VENOUS BLD VENIPUNCTURE: CPT

## 2024-02-20 PROCEDURE — 83036 HEMOGLOBIN GLYCOSYLATED A1C: CPT

## 2024-02-20 PROCEDURE — 85025 COMPLETE CBC W/AUTO DIFF WBC: CPT

## 2024-02-20 PROCEDURE — 80053 COMPREHEN METABOLIC PANEL: CPT

## 2024-02-20 PROCEDURE — 82306 VITAMIN D 25 HYDROXY: CPT

## 2024-02-20 NOTE — LETTER
February 21, 2024    Canelo Logan  [address] [facility] [address]          Dear ,    We are writing to inform you of your test results.  Your hemoglobin ( red blood count ) is lower than previously.  This is likely contributing to fatigue.     Vitamin D is high.  Stop any vitamin D pills.     Other labs stable.     Advise follow up appointment in next month to address the low red blood count ( anemia ).         Resulted Orders   Comprehensive metabolic panel   Result Value Ref Range    Sodium 143 135 - 145 mmol/L      Comment:      Reference intervals for this test were updated on 09/26/2023 to more accurately reflect our healthy population. There may be differences in the flagging of prior results with similar values performed with this method. Interpretation of those prior results can be made in the context of the updated reference intervals.     Potassium 4.4 3.4 - 5.3 mmol/L    Carbon Dioxide (CO2) 22 22 - 29 mmol/L    Anion Gap 10 7 - 15 mmol/L    Urea Nitrogen 24.5 (H) 8.0 - 23.0 mg/dL    Creatinine 1.24 (H) 0.67 - 1.17 mg/dL    GFR Estimate 62 >60 mL/min/1.73m2    Calcium 8.7 (L) 8.8 - 10.2 mg/dL    Chloride 111 (H) 98 - 107 mmol/L    Glucose 160 (H) 70 - 99 mg/dL    Alkaline Phosphatase 181 (H) 40 - 150 U/L      Comment:      Reference intervals for this test were updated on 11/14/2023 to more accurately reflect our healthy population. There may be differences in the flagging of prior results with similar values performed with this method. Interpretation of those prior results can be made in the context of the updated reference intervals.    AST 43 0 - 45 U/L      Comment:      Reference intervals for this test were updated on 6/12/2023 to more accurately reflect our healthy population. There may be differences in the flagging of prior results with similar values performed with this method. Interpretation of those prior results can be made in the context of the updated reference  intervals.    ALT 59 0 - 70 U/L      Comment:      Reference intervals for this test were updated on 6/12/2023 to more accurately reflect our healthy population. There may be differences in the flagging of prior results with similar values performed with this method. Interpretation of those prior results can be made in the context of the updated reference intervals.      Protein Total 6.7 6.4 - 8.3 g/dL    Albumin 3.7 3.5 - 5.2 g/dL    Bilirubin Total 0.8 <=1.2 mg/dL   Hemoglobin A1c   Result Value Ref Range    Hemoglobin A1C 5.5 0.0 - 5.6 %      Comment:      Normal <5.7%   Prediabetes 5.7-6.4%    Diabetes 6.5% or higher     Note: Adopted from ADA consensus guidelines.   Vitamin D Deficiency   Result Value Ref Range    Vitamin D, Total (25-Hydroxy) 71 (H) 20 - 50 ng/mL      Comment:      indicates supplementation, with increased risk of hypercalciuria    Narrative    Season, race, dietary intake, and treatment affect the concentration of 25-hydroxy-Vitamin D. Values may decrease during winter months and increase during summer months.    Vitamin D determination is routinely performed by an immunoassay specific for 25 hydroxyvitamin D3.  If an individual is on vitamin D2(ergocalciferol) supplementation, please specify 25 OH vitamin D2 and D3 level determination by LCMSMS test VITD23.     CBC with platelets and differential   Result Value Ref Range    WBC Count 3.0 (L) 4.0 - 11.0 10e3/uL    RBC Count 3.39 (L) 4.40 - 5.90 10e6/uL    Hemoglobin 10.9 (L) 13.3 - 17.7 g/dL    Hematocrit 34.8 (L) 40.0 - 53.0 %     (H) 78 - 100 fL    MCH 32.2 26.5 - 33.0 pg    MCHC 31.3 (L) 31.5 - 36.5 g/dL    RDW 13.2 10.0 - 15.0 %    Platelet Count 64 (L) 150 - 450 10e3/uL    % Neutrophils 69 %    % Lymphocytes 18 %    % Monocytes 10 %    % Eosinophils 1 %    % Basophils 1 %    % Immature Granulocytes 1 %    NRBCs per 100 WBC 0 <1 /100    Absolute Neutrophils 2.1 1.6 - 8.3 10e3/uL    Absolute Lymphocytes 0.6 (L) 0.8 - 5.3 10e3/uL     Absolute Monocytes 0.3 0.0 - 1.3 10e3/uL    Absolute Eosinophils 0.0 0.0 - 0.7 10e3/uL    Absolute Basophils 0.0 0.0 - 0.2 10e3/uL    Absolute Immature Granulocytes 0.0 <=0.4 10e3/uL    Absolute NRBCs 0.0 10e3/uL       If you have any questions or concerns, please call the clinic at the number listed above.       Sincerely,      Richi Jaramillo MD

## 2024-02-21 NOTE — RESULT ENCOUNTER NOTE
Your hemoglobin ( red blood count ) is lower than previously.  This is likely contributing to fatigue.    Vitamin D is high.  Stop any vitamin D pills.    Other labs stable.    Advise follow up appointment in next month to address the low red blood count ( anemia ).    Richi Jaramillo MD

## 2024-02-22 ENCOUNTER — TELEPHONE (OUTPATIENT)
Dept: FAMILY MEDICINE | Facility: CLINIC | Age: 74
End: 2024-02-22
Payer: COMMERCIAL

## 2024-02-22 NOTE — TELEPHONE ENCOUNTER
Patient calling about lab results from 2/20/24. RN relayed lab result note from PCP. Patient verbalized understanding. RN assisted with scheduling patient for 3/5/24 with PCP. Patient will call if wanting to be seen sooner.     Patient was wondering what you recommend he do at home in the mean time to help with hemoglobin diet/supplement wise. He was also wondering about his liver function with his labs and if you thought everything looked ok?       Please advise.     Randi Tello RN on 2/22/2024 at 3:32 PM       Richi Jaramillo MD  2/21/2024  8:19 AM CST       Your hemoglobin ( red blood count ) is lower than previously.  This is likely contributing to fatigue.     Vitamin D is high.  Stop any vitamin D pills.     Other labs stable.     Advise follow up appointment in next month to address the low red blood count ( anemia ).     Richi Jaramillo MD

## 2024-02-22 NOTE — TELEPHONE ENCOUNTER
Unclear what is causing the low hemoglobin. It is not likely iron deficiency.    Most liver enzymes that have been high in the past are now normal, but a different one ( alkaline phosphatase ) is somewhat elevated.  At the early March appointment we may do further evaluation for this.    Please inform patient    Thanks    Richi Jaramillo MD

## 2024-02-26 NOTE — MR AVS SNAPSHOT
After Visit Summary   8/17/2018    Gucci Logan    MRN: [id number]           Patient Information     Date Of Birth          1950        Visit Information        Provider Department      8/17/2018 11:40 AM Richi Jaramillo MD [facility]        Today's Diagnoses     Left shoulder pain, unspecified chronicity        Right knee pain, unspecified chronicity        Hypertension goal BP (blood pressure) < 140/90        Essential hypertension with goal blood pressure less than 140/90        Muscle pain          Care Instructions    Increase hydrochlorothiazide to 25 mg daily    Continue other meds as is    Hold off on diabetes meds    Keep working on healthy diet/exercise and wt loss    Low  Salt diet    Increase lean protein intake if possible              Follow-ups after your visit        Who to contact     If you have questions or need follow up information about today's clinic visit or your schedule please contact [facility] directly at [phone].  Normal or non-critical lab and imaging results will be communicated to you by MyChart, letter or phone within 4 business days after the clinic has received the results. If you do not hear from us within 7 days, please contact the clinic through MyChart or phone. If you have a critical or abnormal lab result, we will notify you by phone as soon as possible.  Submit refill requests through [facility] or call your pharmacy and they will forward the refill request to us. Please allow 3 business days for your refill to be completed.          Additional Information About Your Visit        Your Vitals Were     Pulse Temperature Pulse Oximetry BMI (Body Mass Index)          62 97.2  F (36.2  C) (Oral) 95% 37.66 kg/m2         Blood Pressure from Last 3 Encounters:   08/17/18 138/67   03/29/18 150/79   03/12/18 162/79    Weight from Last 3 Encounters:   08/17/18 270 lb (122.5 kg)   03/29/18 290 lb (131.5 kg)  - Rest.    - Drink plenty of fluids.  - Viral upper respiratory infections typically run their course in 10-14 days.     - Tylenol (acetaminophen) or Ibuprofen as directed as needed for fever/pain. Avoid tylenol if you have a history of liver disease. Do not take ibuprofen if you have a history of GI bleeding, kidney disease, gastric surgery, or if you take blood thinners.     - You can take over-the-counter claritin, zyrtec, allegra, or xyzal as directed. These are antihistamines that can help with runny nose, nasal congestion, sneezing, and helps to dry up post-nasal drip, which usually causes sore throat and cough.   - If you do NOT have high blood pressure, you may use a decongestant form (D)  of this medication (ie. Claritin- D, zyrtec-D, allegra-D) or if you do not take the D form, you can take sudafed (pseudoephedrine) over the counter, which is a decongestant. Do NOT take two decongestant (D) medications at the same time (such as mucinex-D and claritin-D or plain sudafed and claritin D)    - You can use Flonase (fluticasone) nasal spray as directed for sinus congestion and postnasal drip. This is a steroid nasal spray that works locally over time to decrease the inflammation in your nose/sinuses and help with allergic symptoms. This is not an quick- relief spray like afrin, but it works well if used daily.  Discontinue if you develop nose bleed  - use OTC nasal saline prior to Flonase.  - you can use OTC nasal saline such as Ocean Spray Nasal Saline 1-3 puffs each nostril every 2-3 hours then blow out onto tissue. This is to irrigate the nasal passage way to clear the sinus openings. Use until sinus problem resolved.    - you can take plain OTC Mucinex (guaifenesin) 1200 mg twice a day to help loosen mucous.     -warm salt water gargles can help with sore throat    - warm tea with honey can help with cough. Honey is a natural cough suppressant.    - Dextromethorphan (DM) is a cough suppressant over the    03/12/18 288 lb 9.6 oz (130.9 kg)              Today, you had the following     No orders found for display         Today's Medication Changes          These changes are accurate as of 8/17/18 12:18 PM.  If you have any questions, ask your nurse or doctor.               These medicines have changed or have updated prescriptions.        Dose/Directions    amLODIPine 2.5 MG tablet   Commonly known as:  NORVASC   This may have changed:  See the new instructions.   Used for:  Essential hypertension with goal blood pressure less than 140/90   Changed by:  Richi Jaramillo MD        Dose:  2.5 mg   Take 1 tablet (2.5 mg) by mouth daily   Quantity:  90 tablet   Refills:  1       hydrochlorothiazide 25 MG tablet   Commonly known as:  HYDRODIURIL   This may have changed:    - medication strength  - how much to take   Used for:  Essential hypertension with goal blood pressure less than 140/90   Changed by:  Richi Jaramillo MD        Dose:  25 mg   Take 1 tablet (25 mg) by mouth daily   Quantity:  90 tablet   Refills:  1       losartan 100 MG tablet   Commonly known as:  COZAAR   This may have changed:  See the new instructions.   Used for:  Hypertension goal BP (blood pressure) < 140/90   Changed by:  Richi Jaramillo MD        Dose:  100 mg   Take 1 tablet (100 mg) by mouth daily   Quantity:  90 tablet   Refills:  1       metoprolol succinate 50 MG 24 hr tablet   Commonly known as:  TOPROL-XL   This may have changed:  See the new instructions.   Used for:  Hypertension goal BP (blood pressure) < 140/90   Changed by:  Richi Jaramillo MD        Dose:  50 mg   Take 1 tablet (50 mg) by mouth daily   Quantity:  90 tablet   Refills:  1         Stop taking these medicines if you haven't already. Please contact your care team if you have questions.     cyclobenzaprine 10 MG tablet   Commonly known as:  FLEXERIL   Stopped by:  Richi Jaramillo MD                Where to get your medicines      These medications were sent to  counter (ie. mucinex DM, robitussin, delsym; dayquil/nyquil has DM as well.)    - You received a steroid (prednisone) today.  This can elevate your blood pressure, elevate your blood sugar, water weight gain, nervous energy, redness to the face and dimpling of the skin where the shot goes in.   - Do not use steroids more than 3 times per year.   - If you have diabetes, please check you blood sugar frequently.  - If you have high blood pressure, please check your blood pressure frequently.     - Take the tessalon (benzonatate) as needed as prescribed for cough   - Only take the promethazine- DM as directed, as needed at night for cough. This medication may cause drowsiness.      - Follow up with your PCP or specialty clinic as directed in the next 1-2 weeks if not improved or as needed.  You can call [phone] to schedule an appointment with the appropriate provider.      - Go to the ER if you develop new or worsening symptoms.     - You must understand that you have received an Urgent Care treatment only and that you may be released before all of your medical problems are known or treated.   - You, the patient, will arrange for follow up care as instructed.   - If your condition worsens or fails to improve we recommend that you receive another evaluation at the ER immediately or contact your PCP to discuss your concerns or return here.      [facility] [address] - [address] [place] [place]  [address] [place], [place] [address]     Phone:  [phone]     amLODIPine 2.5 MG tablet    hydrochlorothiazide 25 MG tablet    losartan 100 MG tablet    metoprolol succinate 50 MG 24 hr tablet    tiZANidine 2 MG tablet         Some of these will need a paper prescription and others can be bought over the counter.  Ask your nurse if you have questions.     Bring a paper prescription for each of these medications     HYDROcodone-acetaminophen 5-325 MG per tablet               Information about OPIOIDS     PRESCRIPTION OPIOIDS: WHAT YOU NEED TO KNOW   We gave you an opioid (narcotic) pain medicine. It is important to manage your pain, but opioids are not always the best choice. You should first try all the other options your care team gave you. Take this medicine for as short a time (and as few doses) as possible.    Some activities can increase your pain, such as bandage changes or therapy sessions. It may help to take your pain medicine 30 to 60 minutes before these activities. Reduce your stress by getting enough sleep, working on hobbies you enjoy and practicing relaxation or meditation. Talk to your care team about ways to manage your pain beyond prescription opioids.    These medicines have risks:    DO NOT drive when on new or higher doses of pain medicine. These medicines can affect your alertness and reaction times, and you could be arrested for driving under the influence (DUI). If you need to use opioids long-term, talk to your care team about driving.    DO NOT operate heavy machinery    DO NOT do any other dangerous activities while taking these medicines.    DO NOT drink any alcohol while taking these medicines.     If the opioid prescribed includes acetaminophen, DO NOT take with any other medicines that contain acetaminophen. Read all labels carefully. Look for the word  acetaminophen  or  Tylenol.  Ask your pharmacist if you  have questions or are unsure.    You can get addicted to pain medicines, especially if you have a history of addiction (chemical, alcohol or substance dependence). Talk to your care team about ways to reduce this risk.    All opioids tend to cause constipation. Drink plenty of water and eat foods that have a lot of fiber, such as fruits, vegetables, prune juice, apple juice and high-fiber cereal. Take a laxative (Miralax, milk of magnesia, Colace, Senna) if you don t move your bowels at least every other day. Other side effects include upset stomach, sleepiness, dizziness, throwing up, tolerance (needing more of the medicine to have the same effect), physical dependence and slowed breathing.    Store your pills in a secure place, locked if possible. We will not replace any lost or stolen medicine. If you don t finish your medicine, please throw away (dispose) as directed by your pharmacist. The [place] Pollution Control Agency has more information about safe disposal: https://www.pca.On license of [facility].[email or web address]/living-[email or web address]/managing-[email or web address]-medications         Primary Care Provider Office Phone # Fax #    Richi Jaramillo [phone]       [address] [facility] [address]        Equal Access to Services     ARTHUR FERNANDEZ : Alivia Pryor, washirada noble, qadeuceta kaalmada cristina, shannan singh. So [facility] [phone].    ATENCIÓN: Si habla español, tiene a khoury disposición servicios gratuitos de asistencia lingüística. Manohar al [phone].    We comply with applicable federal civil rights laws and [place] laws. We do not discriminate on the basis of race, color, national origin, age, disability, sex, sexual orientation, or gender identity.            Thank you!     Thank you for choosing [facility]  for your care. Our goal is always to provide you with excellent care. Hearing back from our patients is one way we can continue to  improve our services. Please take a few minutes to complete the written survey that you may receive in the mail after your visit with us. Thank you!             Your Updated Medication List - Protect others around you: Learn how to safely use, store and throw away your medicines at www.disposemymeds.org.          This list is accurate as of 8/17/18 12:18 PM.  Always use your most recent med list.                   Brand Name Dispense Instructions for use Diagnosis    amLODIPine 2.5 MG tablet    NORVASC    90 tablet    Take 1 tablet (2.5 mg) by mouth daily    Essential hypertension with goal blood pressure less than 140/90       diazepam 5 MG tablet    VALIUM    10 tablet    1/2 to 1 po every 6 hours as needed for anxiety    Anxiety       hydrochlorothiazide 25 MG tablet    HYDRODIURIL    90 tablet    Take 1 tablet (25 mg) by mouth daily    Essential hypertension with goal blood pressure less than 140/90       HYDROcodone-acetaminophen 5-325 MG per tablet    NORCO    30 tablet    Take 1 tablet by mouth every 6 hours as needed for moderate to severe pain    Left shoulder pain, unspecified chronicity, Right knee pain, unspecified chronicity       losartan 100 MG tablet    COZAAR    90 tablet    Take 1 tablet (100 mg) by mouth daily    Hypertension goal BP (blood pressure) < 140/90       LYRICA 50 MG capsule   Generic drug:  pregabalin     90 capsule    TAKE ONE CAPSULE BY MOUTH THREE TIMES DAILY    Neuropathy       metoprolol succinate 50 MG 24 hr tablet    TOPROL-XL    90 tablet    Take 1 tablet (50 mg) by mouth daily    Hypertension goal BP (blood pressure) < 140/90       MULTIVITAMIN ADULT PO           omeprazole 20 MG CR capsule    priLOSEC    90 capsule    TAKE ONE CAPSULE BY MOUTH ONCE DAILY.  TAKE 30-60 MINUTES BEFORE A MEAL.    Gastroesophageal reflux disease without esophagitis       tiZANidine 2 MG tablet    ZANAFLEX    90 tablet    Take 1 tablet (2 mg) by mouth 3 times daily as needed for muscle spasms     Muscle pain       vitamin D 2000 units Caps     90 capsule    1 po daily    Vitamin D deficiency disease

## 2024-02-26 NOTE — TELEPHONE ENCOUNTER
Patient calling back, reviewed message with him.    As an FYI to PCP    PT reports left hip and right wrist continue to be bothersome, he is going to therapy for his hip.  Balance is difficult still, PT is not very productive. Still having neuropathy.  He also has a productive cough- gave him home advice for this - increase fluids, hot tea, lemon water, ect.      He will see PCP on 3/5      Heidi, RN    Triage Nurse  [facility]

## 2024-02-28 ENCOUNTER — OFFICE VISIT (OUTPATIENT)
Dept: OPTOMETRY | Facility: CLINIC | Age: 74
End: 2024-02-28
Payer: COMMERCIAL

## 2024-02-28 DIAGNOSIS — H40.013 OPEN ANGLE WITH BORDERLINE FINDINGS OF BOTH EYES: ICD-10-CM

## 2024-02-28 DIAGNOSIS — E11.9 DIABETES MELLITUS WITHOUT COMPLICATION (H): ICD-10-CM

## 2024-02-28 DIAGNOSIS — H52.4 PRESBYOPIA: Primary | ICD-10-CM

## 2024-02-28 PROCEDURE — 92015 DETERMINE REFRACTIVE STATE: CPT

## 2024-02-28 PROCEDURE — 92004 COMPRE OPH EXAM NEW PT 1/>: CPT

## 2024-02-28 ASSESSMENT — REFRACTION_MANIFEST
OD_CYLINDER: +1.50
OD_SPHERE: -0.50
OS_SPHERE: -2.25
OD_ADD: +2.50
OS_SPHERE: -2.50
OD_SPHERE: -0.25
METHOD_AUTOREFRACTION: 1
OS_AXIS: 142
OS_CYLINDER: +1.25
OS_CYLINDER: +1.00
OD_AXIS: 011
OS_ADD: +2.50
OS_AXIS: 142
OD_AXIS: 010
OD_CYLINDER: +1.25

## 2024-02-28 ASSESSMENT — VISUAL ACUITY
OS_PH_SC: 20/40
OD_SC: 20/100
OD_SC+: -2
OS_SC: 20/60
OD_SC: 20/25
METHOD: SNELLEN - LINEAR
OS_SC+: -2
OS_SC: 20/20

## 2024-02-28 ASSESSMENT — CONF VISUAL FIELD
OS_SUPERIOR_NASAL_RESTRICTION: 0
OS_INFERIOR_TEMPORAL_RESTRICTION: 0
OD_SUPERIOR_TEMPORAL_RESTRICTION: 0
OD_SUPERIOR_NASAL_RESTRICTION: 0
OS_SUPERIOR_TEMPORAL_RESTRICTION: 0
OD_INFERIOR_TEMPORAL_RESTRICTION: 0
OS_INFERIOR_NASAL_RESTRICTION: 0
OD_NORMAL: 1
OD_INFERIOR_NASAL_RESTRICTION: 0
OS_NORMAL: 1

## 2024-02-28 ASSESSMENT — KERATOMETRY
OS_AXISANGLE_DEGREES: 132
OS_AXISANGLE2_DEGREES: 42
OD_K2POWER_DIOPTERS: 41.25
OD_AXISANGLE_DEGREES: 3
OS_K2POWER_DIOPTERS: 41.50
OD_K1POWER_DIOPTERS: 40.50
OS_K1POWER_DIOPTERS: 40.75
OD_AXISANGLE2_DEGREES: 93

## 2024-02-28 ASSESSMENT — TONOMETRY
OS_IOP_MMHG: 13
OD_IOP_MMHG: 11
IOP_METHOD: TONOPEN

## 2024-02-28 ASSESSMENT — SLIT LAMP EXAM - LIDS
COMMENTS: DERMATOCHALASIS
COMMENTS: DERMATOCHALASIS

## 2024-02-28 ASSESSMENT — EXTERNAL EXAM - RIGHT EYE: OD_EXAM: NORMAL

## 2024-02-28 ASSESSMENT — CUP TO DISC RATIO
OD_RATIO: 0.7
OS_RATIO: 0.6

## 2024-02-28 ASSESSMENT — EXTERNAL EXAM - LEFT EYE: OS_EXAM: NORMAL

## 2024-02-28 NOTE — TELEPHONE ENCOUNTER
"Patient calling clinic, notes that he has eye exam today at [facility] and was asking if he should just have blood work drawn for Dr. Richi Jaramillo at the same time. Writer relayed provider's message below:    \"Advise follow up appointment in next month to address the low red blood count ( anemia ).     Richi Jaramillo MD\"    \"  Unclear what is causing the low hemoglobin. It is not likely iron deficiency.     Most liver enzymes that have been high in the past are now normal, but a different one ( alkaline phosphatase ) is somewhat elevated.  At the early March appointment we may do further evaluation for this.     Please inform patient     Thanks     Richi Jaramillo MD   \"    Discussing that per PCP he would like patient to be seen for a visit with him and then appropriate lab work and follow-up can then be discussed and completed, patient states understanding, no further questions.    ABI Szymanski RN  [facility] [place] [facility]    "

## 2024-02-28 NOTE — PROGRESS NOTES
Chief Complaint   Patient presents with    Diabetic Eye Exam     Borderline        Chief Complaint(s) and History of Present Illness(es)       Diabetic Eye Exam              Diabetes Type: Type 2    Comments: Borderline                   Lab Results   Component Value Date    A1C 5.5 02/20/2024    A1C 5.3 12/20/2023    A1C 5.9 08/29/2023    A1C 6.2 06/13/2023    A1C 6.6 04/07/2023    A1C 9.7 04/05/2021    A1C 7.5 01/12/2021    A1C 6.5 09/11/2020    A1C 7.5 07/09/2020    A1C 9.7 04/20/2020            Last Eye Exam: 1 year ago   Dilated Previously: Declined dilation today    What are you currently using to see?  does not use glasses or contacts    Distance Vision Acuity: Satisfied with vision    Near Vision Acuity: Satisfied with vision while reading and using computer unaided    Eye Comfort: watery, noticed there has been twitching in the eye but may be due to stress  Do you use eye drops? : No  Occupation or Hobbies: remodeling, real estate    Denelle Keaton - Optometric Assistant     Medical, surgical and family histories reviewed and updated 2/28/2024.       OBJECTIVE: See Ophthalmology exam    ASSESSMENT:    ICD-10-CM    1. Presbyopia  H52.4 REFRACTION     EYE EXAM (SIMPLE-NONBILLABLE)      2. Open angle with borderline findings of both eyes  H40.013 EYE EXAM (SIMPLE-NONBILLABLE)     Adult Eye  Referral      3. Diabetes mellitus without complication (H)  E11.9           PLAN:    Gucci Logan aware  eye exam results will be sent to Richi Jaramillo    Patient Instructions   Presbyopia: Monovision s/p CE.  Provided optional glasses prescription.  Open angle borderline findings:  NRR thinning, normal IOP with tonopen each eye.  Refer for glaucoma baseline testing.  T2DM:  No retinopathy to extent viewed, patient declined dilation today.  Patient educated on condition. Stressed importance of close BS/BP monitoring, diet/exercise, medication compliance, and regular visits with PCP. Monitor annually.         Louise Fuller, OD

## 2024-02-28 NOTE — LETTER
2/28/2024         RE: Gucci Logan  [address] [facility] [address]        Dear Colleague,    Thank you for referring your patient, Gucci Logan, to the [facility]. Please see a copy of my visit note below.    Chief Complaint   Patient presents with     Diabetic Eye Exam     Borderline        Chief Complaint(s) and History of Present Illness(es)       Diabetic Eye Exam              Diabetes Type: Type 2    Comments: Borderline                   Lab Results   Component Value Date    A1C 5.5 02/20/2024    A1C 5.3 12/20/2023    A1C 5.9 08/29/2023    A1C 6.2 06/13/2023    A1C 6.6 04/07/2023    A1C 9.7 04/05/2021    A1C 7.5 01/12/2021    A1C 6.5 09/11/2020    A1C 7.5 07/09/2020    A1C 9.7 04/20/2020            Last Eye Exam: 1 year ago   Dilated Previously: Declined dilation today    What are you currently using to see?  does not use glasses or contacts    Distance Vision Acuity: Satisfied with vision    Near Vision Acuity: Satisfied with vision while reading and using computer unaided    Eye Comfort: watery, noticed there has been twitching in the eye but may be due to stress  Do you use eye drops? : No  Occupation or Hobbies: remodeling, real estate    Suze Pugh - Optometric Assistant     Medical, surgical and family histories reviewed and updated 2/28/2024.       OBJECTIVE: See Ophthalmology exam    ASSESSMENT:    ICD-10-CM    1. Presbyopia  H52.4 REFRACTION     EYE EXAM (SIMPLE-NONBILLABLE)      2. Open angle with borderline findings of both eyes  H40.013 EYE EXAM (SIMPLE-NONBILLABLE)     Adult Eye  Referral      3. Diabetes mellitus without complication (H)  E11.9           PLAN:    Gucci Logan aware  eye exam results will be sent to Richi Jaramillo    Patient Instructions   Presbyopia: Monovision s/p CE.  Provided optional glasses prescription.  Open angle borderline findings:  NRR thinning, normal IOP with tonopen each eye.  Refer for  glaucoma baseline testing.  T2DM:  No retinopathy to extent viewed, patient declined dilation today.  Patient educated on condition. Stressed importance of close BS/BP monitoring, diet/exercise, medication compliance, and regular visits with PCP. Monitor annually.        Louise Fuller, ANGELINE          Again, thank you for allowing me to participate in the care of your patient.        Sincerely,        Louise Fuller OD

## 2024-02-28 NOTE — PATIENT INSTRUCTIONS
Presbyopia: Monovision s/p CE.  Provided optional glasses prescription.  Open angle borderline findings:  NRR thinning, normal IOP with tonopen each eye.  Refer for glaucoma baseline testing.  T2DM:  No retinopathy to extent viewed, patient declined dilation today.  Patient educated on condition. Stressed importance of close BS/BP monitoring, diet/exercise, medication compliance, and regular visits with PCP. Monitor annually.

## 2024-03-05 ENCOUNTER — OFFICE VISIT (OUTPATIENT)
Dept: FAMILY MEDICINE | Facility: CLINIC | Age: 74
End: 2024-03-05
Payer: COMMERCIAL

## 2024-03-05 ENCOUNTER — PATIENT OUTREACH (OUTPATIENT)
Dept: ONCOLOGY | Facility: CLINIC | Age: 74
End: 2024-03-05

## 2024-03-05 VITALS
BODY MASS INDEX: 35.98 KG/M2 | SYSTOLIC BLOOD PRESSURE: 137 MMHG | HEIGHT: 71 IN | OXYGEN SATURATION: 98 % | TEMPERATURE: 98.1 F | DIASTOLIC BLOOD PRESSURE: 56 MMHG | HEART RATE: 55 BPM | WEIGHT: 257 LBS

## 2024-03-05 DIAGNOSIS — M79.10 MUSCLE PAIN: ICD-10-CM

## 2024-03-05 DIAGNOSIS — S22.000A COMPRESSION FRACTURE OF THORACIC VERTEBRA, UNSPECIFIED THORACIC VERTEBRAL LEVEL, INITIAL ENCOUNTER (H): ICD-10-CM

## 2024-03-05 DIAGNOSIS — I10 HYPERTENSION GOAL BP (BLOOD PRESSURE) < 140/90: ICD-10-CM

## 2024-03-05 DIAGNOSIS — R10.84 ABDOMINAL PAIN, GENERALIZED: ICD-10-CM

## 2024-03-05 DIAGNOSIS — D64.9 ANEMIA, UNSPECIFIED TYPE: Primary | ICD-10-CM

## 2024-03-05 DIAGNOSIS — M70.62 TROCHANTERIC BURSITIS OF LEFT HIP: ICD-10-CM

## 2024-03-05 DIAGNOSIS — F43.9 STRESS: ICD-10-CM

## 2024-03-05 PROCEDURE — 99214 OFFICE O/P EST MOD 30 MIN: CPT | Performed by: FAMILY MEDICINE

## 2024-03-05 RX ORDER — TRAMADOL HYDROCHLORIDE 50 MG/1
50 TABLET ORAL EVERY 6 HOURS PRN
Qty: 30 TABLET | Refills: 0 | Status: SHIPPED | OUTPATIENT
Start: 2024-03-05 | End: 2024-04-02

## 2024-03-05 ASSESSMENT — PAIN SCALES - GENERAL: PAINLEVEL: NO PAIN (0)

## 2024-03-05 NOTE — PROGRESS NOTES
Lakhwinder Lemos is a 73 year old, presenting for the following health issues:  RECHECK (Following up on anemia, dehydration )      3/5/2024     9:56 AM   Additional Questions   Roomed by cam   Accompanied by none         3/5/2024     9:56 AM   Patient Reported Additional Medications   Patient reports taking the following new medications none     HPI         Patient is here today with multiple concerns, see his list.    Left hip is most bothersome    Bursitis?  In muscle    Seeing [facility]     In therapy, not helpiing a lot    Not taking much for pain    Legs different length?    Gall bladder? Acting up    Liver/ spleen    No change in dyspnea, but not as often    Right chest wall bothering    Neuropathy, vertigo    Cutting out stress          Objective    There were no vitals taken for this visit.  There is no height or weight on file to calculate BMI.  Physical Exam  Constitutional:       Appearance: He is well-developed.   HENT:      Head: Normocephalic and atraumatic.   Eyes:      Conjunctiva/sclera: Conjunctivae normal.   Neck:      Vascular: No carotid bruit.   Cardiovascular:      Rate and Rhythm: Normal rate and regular rhythm.      Heart sounds: Normal heart sounds.   Pulmonary:      Effort: Pulmonary effort is normal. No respiratory distress.      Breath sounds: Normal breath sounds.   Neurological:      Mental Status: He is alert and oriented to person, place, and time.      Cranial Nerves: No cranial nerve deficit.   Psychiatric:         Speech: Speech normal.         Behavior: Behavior normal.      Abd soft, mildly tender upper abd    No peritoneal signs       ASSESSMENT / PLAN:  (D64.9) Anemia, unspecified type  (primary encounter diagnosis)  Comment: unclear etiology  Plan: Adult Oncology/Hematology  Referral        Patient to schedule with hematology     (R10.84) Abdominal pain, generalized  Comment: patient to schedule   Plan: US Abdomen Complete             (M79.10) Muscle pain  Comment:  use this prn   Plan: tiZANidine (ZANAFLEX) 4 MG tablet             (S22.000A) Compression fracture of thoracic vertebra, unspecified thoracic vertebral level, initial encounter (H)  Comment: refill   Plan: traMADol (ULTRAM) 50 MG tablet             (M70.62) Trochanteric bursitis of left hip  Comment: use this topically and follow up with orthopedics for possible injection if needed   Plan: diclofenac (VOLTAREN) 1 % topical gel             (F43.9) Stress  Comment: patient seems stable emotionally   Plan: follow up prn     (I10) Hypertension goal BP (blood pressure) < 140/90  Comment: at goal   Plan: as above       I reviewed the patient's medications, allergies, medical history, family history, and social history.    Richi Jaramillo MD              Signed Electronically by: Richi Jaramillo MD

## 2024-03-05 NOTE — PATIENT INSTRUCTIONS
Schedule ultrasound abdomen    See liver specialist    Schedule hematology     Try diclofenac gel/ cream topically to hip bursa area    Continue pills as is    Stretching/ strengthening for the back and hand

## 2024-03-05 NOTE — PROGRESS NOTES
Hematology referral reviewed for [facility], see below.    Referral reason: referral received from PCP for pancytopenia, varying low counts within CBC for years with platelets notably intermittently low to 45K recently, currently at 64K which appears to be the approximate baseline for his platelets    Clinical question entered by referring provider or through order transcription: anemia    Referral received via: Internal referral by [facility]    Current abnormal labs: Available in Chart Review    Outreach: Generic voicemail left regarding referral.    Plan: Triage instructions updated and sent to NPS for completion, VM left for patient, [facility] not available to send message.

## 2024-03-11 ENCOUNTER — TELEPHONE (OUTPATIENT)
Dept: GASTROENTEROLOGY | Facility: CLINIC | Age: 74
End: 2024-03-11
Payer: COMMERCIAL

## 2024-03-11 NOTE — TELEPHONE ENCOUNTER
Left voice message with patient advising him to discuss gallbladder issues with his primary doctor.     Ava SNELL LPN  Hepatology Clinic     --------  [facility]    Phone Message    May a detailed message be left on voicemail: yes     Reason for Call: Other: Canelo is calling in asking for a call back, as the Pt is looking to get medical advice from his care team regarding gallbladder issues. Please call back as soon as possible to discuss.     Action Taken: Message routed to:  [facility] Hep    Travel Screening: Not Applicable

## 2024-03-12 NOTE — TELEPHONE ENCOUNTER
Left message for patient that Dr. Renteria doesn't need any additional labs and current ultrasound that is scheduled is fine.    Ava SNELL LPN  Hepatology Clinic       --------------  [facility] Call Center    Phone Message    May a detailed message be left on voicemail: yes     Reason for Call: Other: Canelo is calling in asking to leave a message for Ava. Pt states that he has an upcoming US appt on 3/18 and would like to have Dr. Renteria look at his recent lab work and determine if there is anything further that needs to be ordered or if there are any changes that need to be made for his US. Please respond accordingly.     Action Taken: Message routed to:  [facility]    Travel Screening: Not Applicable

## 2024-03-18 ENCOUNTER — ANCILLARY PROCEDURE (OUTPATIENT)
Dept: ULTRASOUND IMAGING | Facility: CLINIC | Age: 74
End: 2024-03-18
Attending: INTERNAL MEDICINE
Payer: COMMERCIAL

## 2024-03-18 DIAGNOSIS — K74.60 CIRRHOSIS OF LIVER WITHOUT ASCITES, UNSPECIFIED HEPATIC CIRRHOSIS TYPE (H): ICD-10-CM

## 2024-03-18 PROCEDURE — 76705 ECHO EXAM OF ABDOMEN: CPT | Mod: TC | Performed by: RADIOLOGY

## 2024-03-21 ENCOUNTER — TELEPHONE (OUTPATIENT)
Dept: FAMILY MEDICINE | Facility: CLINIC | Age: 74
End: 2024-03-21
Payer: COMMERCIAL

## 2024-03-21 NOTE — TELEPHONE ENCOUNTER
Patient would like a copy of his US done 03/18/24 mailed to his home.  Zully Arcos   [facility]

## 2024-03-28 NOTE — TELEPHONE ENCOUNTER
RECORDS STATUS - ALL OTHER DIAGNOSIS      RECORDS RECEIVED FROM: [facility] - Internal Records   DATE RECEIVED: 3/28

## 2024-03-29 ENCOUNTER — TELEPHONE (OUTPATIENT)
Dept: GASTROENTEROLOGY | Facility: CLINIC | Age: 74
End: 2024-03-29
Payer: COMMERCIAL

## 2024-03-29 DIAGNOSIS — K74.60 CIRRHOSIS OF LIVER WITHOUT ASCITES, UNSPECIFIED HEPATIC CIRRHOSIS TYPE (H): Primary | ICD-10-CM

## 2024-03-29 NOTE — TELEPHONE ENCOUNTER
Ultrasound order entered and patient updated.    Ava SNELL LPN  Hepatology Clinic     ----------------  [facility] Call Center    Phone Message    May a detailed message be left on voicemail: yes     Reason for Call: Order(s): Other:   Reason for requested: US  Date needed: ASAP  Provider name: Dr. Renteria   Pt's visit is scheduled for 10/14, please reach out to Pt once order is placed so they may schedule. Thank you!      Action Taken: Message routed to:  [facility] Hepatology    Travel Screening: Not Applicable

## 2024-04-01 ENCOUNTER — PRE VISIT (OUTPATIENT)
Dept: ONCOLOGY | Facility: CLINIC | Age: 74
End: 2024-04-01
Payer: COMMERCIAL

## 2024-04-09 ENCOUNTER — LAB (OUTPATIENT)
Dept: LAB | Facility: CLINIC | Age: 74
End: 2024-04-09
Payer: COMMERCIAL

## 2024-04-09 ENCOUNTER — TELEPHONE (OUTPATIENT)
Dept: FAMILY MEDICINE | Facility: CLINIC | Age: 74
End: 2024-04-09

## 2024-04-09 DIAGNOSIS — E11.9 DIABETES MELLITUS WITHOUT COMPLICATION (H): ICD-10-CM

## 2024-04-09 DIAGNOSIS — R53.83 FATIGUE, UNSPECIFIED TYPE: ICD-10-CM

## 2024-04-09 DIAGNOSIS — E03.9 HYPOTHYROIDISM, UNSPECIFIED TYPE: Primary | ICD-10-CM

## 2024-04-09 DIAGNOSIS — E03.9 HYPOTHYROIDISM, UNSPECIFIED TYPE: ICD-10-CM

## 2024-04-09 LAB
ACANTHOCYTES BLD QL SMEAR: NORMAL
ALBUMIN SERPL BCG-MCNC: 4 G/DL (ref 3.5–5.2)
ALP SERPL-CCNC: 153 U/L (ref 40–150)
ALT SERPL W P-5'-P-CCNC: 47 U/L (ref 0–70)
ANION GAP SERPL CALCULATED.3IONS-SCNC: 9 MMOL/L (ref 7–15)
AST SERPL W P-5'-P-CCNC: 47 U/L (ref 0–45)
AUER BODIES BLD QL SMEAR: NORMAL
BASO STIPL BLD QL SMEAR: NORMAL
BASOPHILS # BLD AUTO: 0 10E3/UL (ref 0–0.2)
BASOPHILS NFR BLD AUTO: 0 %
BILIRUB SERPL-MCNC: 1.3 MG/DL
BITE CELLS BLD QL SMEAR: NORMAL
BLISTER CELLS BLD QL SMEAR: NORMAL
BUN SERPL-MCNC: 28 MG/DL (ref 8–23)
BURR CELLS BLD QL SMEAR: NORMAL
CALCIUM SERPL-MCNC: 9.3 MG/DL (ref 8.8–10.2)
CHLORIDE SERPL-SCNC: 110 MMOL/L (ref 98–107)
CREAT SERPL-MCNC: 1.31 MG/DL (ref 0.67–1.17)
DACRYOCYTES BLD QL SMEAR: NORMAL
DEPRECATED HCO3 PLAS-SCNC: 27 MMOL/L (ref 22–29)
EGFRCR SERPLBLD CKD-EPI 2021: 58 ML/MIN/1.73M2
ELLIPTOCYTES BLD QL SMEAR: NORMAL
EOSINOPHIL # BLD AUTO: 0.1 10E3/UL (ref 0–0.7)
EOSINOPHIL NFR BLD AUTO: 2 %
ERYTHROCYTE [DISTWIDTH] IN BLOOD BY AUTOMATED COUNT: 13.7 % (ref 10–15)
FRAGMENTS BLD QL SMEAR: NORMAL
GIANT PLATELETS BLD QL SMEAR: NORMAL
GLUCOSE SERPL-MCNC: 110 MG/DL (ref 70–99)
HBA1C MFR BLD: 5.5 %
HCT VFR BLD AUTO: 36.7 % (ref 40–53)
HGB BLD-MCNC: 11.8 G/DL (ref 13.3–17.7)
HGB C CRYSTALS: NORMAL
HOLD SPECIMEN: NORMAL
HOLD SPECIMEN: NORMAL
HOWELL-JOLLY BOD BLD QL SMEAR: NORMAL
IMM GRANULOCYTES # BLD: 0 10E3/UL
IMM GRANULOCYTES NFR BLD: 0 %
LYMPHOCYTES # BLD AUTO: 0.6 10E3/UL (ref 0.8–5.3)
LYMPHOCYTES NFR BLD AUTO: 18 %
MCH RBC QN AUTO: 32.2 PG (ref 26.5–33)
MCHC RBC AUTO-ENTMCNC: 32.2 G/DL (ref 31.5–36.5)
MCV RBC AUTO: 100 FL (ref 78–100)
MONOCYTES # BLD AUTO: 0.3 10E3/UL (ref 0–1.3)
MONOCYTES NFR BLD AUTO: 10 %
NEUTROPHILS # BLD AUTO: 2.4 10E3/UL (ref 1.6–8.3)
NEUTROPHILS NFR BLD AUTO: 71 %
NEUTS HYPERSEG BLD QL SMEAR: NORMAL
NRBC # BLD AUTO: 0 10E3/UL
NRBC BLD AUTO-RTO: 0 /100
PATH REV: NORMAL
PLAT MORPH BLD: NORMAL
PLATELET # BLD AUTO: 48 10E3/UL (ref 150–450)
POLYCHROMASIA BLD QL SMEAR: NORMAL
POTASSIUM SERPL-SCNC: 4.7 MMOL/L (ref 3.4–5.3)
PROT SERPL-MCNC: 7.3 G/DL (ref 6.4–8.3)
RBC # BLD AUTO: 3.67 10E6/UL (ref 4.4–5.9)
RBC AGGLUT BLD QL: NORMAL
RBC MORPH BLD: NORMAL
ROULEAUX BLD QL SMEAR: NORMAL
SICKLE CELLS BLD QL SMEAR: NORMAL
SMUDGE CELLS BLD QL SMEAR: NORMAL
SODIUM SERPL-SCNC: 146 MMOL/L (ref 135–145)
SPHEROCYTES BLD QL SMEAR: NORMAL
STOMATOCYTES BLD QL SMEAR: NORMAL
T4 FREE SERPL-MCNC: 1.08 NG/DL (ref 0.9–1.7)
TARGETS BLD QL SMEAR: NORMAL
TOXIC GRANULES BLD QL SMEAR: NORMAL
TSH SERPL DL<=0.005 MIU/L-ACNC: 4.94 UIU/ML (ref 0.3–4.2)
VARIANT LYMPHS BLD QL SMEAR: NORMAL
WBC # BLD AUTO: 3.4 10E3/UL (ref 4–11)

## 2024-04-09 PROCEDURE — 84439 ASSAY OF FREE THYROXINE: CPT

## 2024-04-09 PROCEDURE — 85025 COMPLETE CBC W/AUTO DIFF WBC: CPT

## 2024-04-09 PROCEDURE — 36415 COLL VENOUS BLD VENIPUNCTURE: CPT

## 2024-04-09 PROCEDURE — 84443 ASSAY THYROID STIM HORMONE: CPT

## 2024-04-09 PROCEDURE — 83036 HEMOGLOBIN GLYCOSYLATED A1C: CPT

## 2024-04-09 PROCEDURE — 80053 COMPREHEN METABOLIC PANEL: CPT

## 2024-04-09 NOTE — TELEPHONE ENCOUNTER
Karind with Dr. Jaramillo and he will follow up on this result.     Katie Nair RN  [facility]

## 2024-04-09 NOTE — TELEPHONE ENCOUNTER
Received Epic Chat message from Betty Mckeon from the [facility], messaging to report a critical value of platelet count of 48.    Routing to PCP as high priority to please advise.    PETER BrownN RN  [facility]

## 2024-04-09 NOTE — TELEPHONE ENCOUNTER
Received call from Shadow with [facility] in [place]. Patient is currently waiting at the lab to have his blood drawn for upcoming office visit with PCP on 4/12. He is requesting orders for a CBC and CMP.     Patient previously had labs drawn on 2/20 and was advised to follow-up regarding anemia.     Please advise. Routing as high priority to PCP, as patient is at the lab waiting for orders.    Please call Shadow back with [facility] in [place] to inform once lab orders have been placed.    Venkata Adams, PETERN RN  [facility]

## 2024-04-12 ENCOUNTER — VIRTUAL VISIT (OUTPATIENT)
Dept: FAMILY MEDICINE | Facility: CLINIC | Age: 74
End: 2024-04-12
Payer: COMMERCIAL

## 2024-04-12 DIAGNOSIS — I10 HYPERTENSION GOAL BP (BLOOD PRESSURE) < 140/90: ICD-10-CM

## 2024-04-12 DIAGNOSIS — J20.9 ACUTE BRONCHITIS WITH SYMPTOMS > 10 DAYS: Primary | ICD-10-CM

## 2024-04-12 DIAGNOSIS — R07.81 RIB PAIN: ICD-10-CM

## 2024-04-12 DIAGNOSIS — D69.6 THROMBOCYTOPENIA (H): ICD-10-CM

## 2024-04-12 DIAGNOSIS — E66.9 OBESITY, UNSPECIFIED OBESITY SEVERITY, UNSPECIFIED OBESITY TYPE: ICD-10-CM

## 2024-04-12 DIAGNOSIS — E11.9 NON-INSULIN DEPENDENT TYPE 2 DIABETES MELLITUS (H): ICD-10-CM

## 2024-04-12 PROCEDURE — 99443 PR PHYSICIAN TELEPHONE EVALUATION 21-30 MIN: CPT | Mod: 93 | Performed by: FAMILY MEDICINE

## 2024-04-12 RX ORDER — AZITHROMYCIN 250 MG/1
TABLET, FILM COATED ORAL
Qty: 6 TABLET | Refills: 1 | Status: SHIPPED | OUTPATIENT
Start: 2024-04-12 | End: 2024-04-17

## 2024-04-12 NOTE — PROGRESS NOTES
Canelo is a 73 year old who is being evaluated via a billable telephone visit.    What phone number would you like to be contacted at? [phone]  How would you like to obtain your AVS? Mail a copy  Originating Location (pt. Location): Home    Distant Location (provider location):  On-site        Subjective   Canelo is a 73 year old, presenting for the following health issues:  Patient Request        4/12/2024    11:18 AM   Additional Questions   Roomed by Sofia Conde     History of Present Illness       Reason for visit:  Follow up    He eats 0-1 servings of fruits and vegetables daily.He consumes 0 sweetened beverage(s) daily.He exercises with enough effort to increase his heart rate 9 or less minutes per day.  He exercises with enough effort to increase his heart rate 3 or less days per week.   He is taking medications regularly.           Changed to phone visit  Cat knocked patient over this am    Leg pain moved to upper thigh    May have to start using cane    Some rib pain    Tizanidine taken in evening mainly  Does help sleep    Sleeps through night    6ish hours of sleep    Sometimes 10ish hours     Getting bursitis treated physical therapy    Coughing up colored phlegm for at least 10 days    No blood    Patient mainly up in [facility]    Needs form signed for diabetes shoes    No bleeding    Off ozempic since mid Jan    Up just a few pounds    Ongoing fatigue    Right leg shorter than left, needs form completed for shoes          Objective           Vitals:  No vitals were obtained today due to virtual visit.    Physical Exam   General: Alert and no distress //Respiratory: No audible wheeze, cough, or shortness of breath // Psychiatric:  Appropriate affect, tone, and pace of words      Went over labs in great detail    ASSESSMENT / PLAN:  (J20.9) Acute bronchitis with symptoms > 10 days  (primary encounter diagnosis)  Comment: worsening symptoms   Plan: azithromycin (ZITHROMAX) 250 MG tablet        This  has helped in past     (I10) Hypertension goal BP (blood pressure) < 140/90  Comment: overall patient stable on meds   Plan: as above     (E11.9) Non-insulin dependent type 2 diabetes mellitus (H)  Comment: hemoglobin a1c was fine  Plan: as above     (E66.9) Obesity, unspecified obesity severity, unspecified obesity type  Comment: chronic   Plan: keep working on healthy diet/exercise and wt loss     (D69.6) Thrombocytopenia (H24)  Comment: chronic, stable   Plan: as above       Rib pain: monitor, use prn diclofenac gel.  Follow up prn.     I reviewed the patient's medications, allergies, medical history, family history, and social history.    Richi Jaramillo MD           Phone call duration:   26 minutes ( 11:21 to 11:47 am )    Signed Electronically by: Richi Jaramillo MD

## 2024-04-15 ENCOUNTER — MEDICAL CORRESPONDENCE (OUTPATIENT)
Dept: HEALTH INFORMATION MANAGEMENT | Facility: CLINIC | Age: 74
End: 2024-04-15
Payer: COMMERCIAL

## 2024-04-16 ENCOUNTER — ONCOLOGY VISIT (OUTPATIENT)
Dept: ONCOLOGY | Facility: CLINIC | Age: 74
End: 2024-04-16
Payer: COMMERCIAL

## 2024-04-16 VITALS
BODY MASS INDEX: 36.3 KG/M2 | TEMPERATURE: 98.7 F | SYSTOLIC BLOOD PRESSURE: 150 MMHG | DIASTOLIC BLOOD PRESSURE: 62 MMHG | WEIGHT: 259.3 LBS | HEIGHT: 71 IN

## 2024-04-16 DIAGNOSIS — R63.39 OTHER FEEDING DIFFICULTIES: ICD-10-CM

## 2024-04-16 DIAGNOSIS — Z11.59 ENCOUNTER FOR SCREENING FOR OTHER VIRAL DISEASES: ICD-10-CM

## 2024-04-16 DIAGNOSIS — K74.69 OTHER CIRRHOSIS OF LIVER (H): ICD-10-CM

## 2024-04-16 DIAGNOSIS — D69.6 THROMBOCYTOPENIA (H): ICD-10-CM

## 2024-04-16 DIAGNOSIS — D64.9 ANEMIA, UNSPECIFIED TYPE: ICD-10-CM

## 2024-04-16 DIAGNOSIS — D72.810 LYMPHOPENIA: ICD-10-CM

## 2024-04-16 DIAGNOSIS — D72.819 LEUKOPENIA, UNSPECIFIED TYPE: Primary | ICD-10-CM

## 2024-04-16 LAB
ALBUMIN SERPL BCG-MCNC: 3.7 G/DL (ref 3.5–5.2)
ALBUMIN UR-MCNC: 100 MG/DL
ALP SERPL-CCNC: 167 U/L (ref 40–150)
ALT SERPL W P-5'-P-CCNC: 48 U/L (ref 0–70)
ANION GAP SERPL CALCULATED.3IONS-SCNC: 11 MMOL/L (ref 7–15)
APPEARANCE UR: CLEAR
AST SERPL W P-5'-P-CCNC: 52 U/L (ref 0–45)
BASOPHILS # BLD AUTO: 0 10E3/UL (ref 0–0.2)
BASOPHILS NFR BLD AUTO: 0 %
BILIRUB DIRECT SERPL-MCNC: 0.33 MG/DL (ref 0–0.3)
BILIRUB SERPL-MCNC: 1.2 MG/DL
BILIRUB UR QL STRIP: NEGATIVE
BUN SERPL-MCNC: 27.7 MG/DL (ref 8–23)
CALCIUM SERPL-MCNC: 8.9 MG/DL (ref 8.8–10.2)
CHLORIDE SERPL-SCNC: 108 MMOL/L (ref 98–107)
COLOR UR AUTO: YELLOW
CREAT SERPL-MCNC: 1.32 MG/DL (ref 0.67–1.17)
CRP SERPL-MCNC: 7.88 MG/L
DEPRECATED HCO3 PLAS-SCNC: 25 MMOL/L (ref 22–29)
EGFRCR SERPLBLD CKD-EPI 2021: 57 ML/MIN/1.73M2
EOSINOPHIL # BLD AUTO: 0.1 10E3/UL (ref 0–0.7)
EOSINOPHIL NFR BLD AUTO: 2 %
ERYTHROCYTE [DISTWIDTH] IN BLOOD BY AUTOMATED COUNT: 13.4 % (ref 10–15)
ERYTHROCYTE [SEDIMENTATION RATE] IN BLOOD BY WESTERGREN METHOD: 25 MM/HR (ref 0–20)
FERRITIN SERPL-MCNC: 167 NG/ML (ref 31–409)
FY SUP(A) AG RBC QL: NEGATIVE
FY SUP(B) AG RBC QL: POSITIVE
GLUCOSE SERPL-MCNC: 144 MG/DL (ref 70–99)
GLUCOSE UR STRIP-MCNC: NEGATIVE MG/DL
HCT VFR BLD AUTO: 33.1 % (ref 40–53)
HCT VFR BLD AUTO: 33.1 % (ref 40–53)
HGB BLD-MCNC: 10.9 G/DL (ref 13.3–17.7)
HGB UR QL STRIP: ABNORMAL
IMM GRANULOCYTES # BLD: 0 10E3/UL
IMM GRANULOCYTES NFR BLD: 0 %
IRON BINDING CAPACITY (ROCHE): 290 UG/DL (ref 240–430)
IRON SATN MFR SERPL: 21 % (ref 15–46)
IRON SERPL-MCNC: 61 UG/DL (ref 61–157)
KETONES UR STRIP-MCNC: NEGATIVE MG/DL
LDH SERPL L TO P-CCNC: 251 U/L (ref 0–250)
LEUKOCYTE ESTERASE UR QL STRIP: NEGATIVE
LYMPHOCYTES # BLD AUTO: 0.5 10E3/UL (ref 0.8–5.3)
LYMPHOCYTES NFR BLD AUTO: 15 %
MCH RBC QN AUTO: 32.5 PG (ref 26.5–33)
MCHC RBC AUTO-ENTMCNC: 32.9 G/DL (ref 31.5–36.5)
MCV RBC AUTO: 99 FL (ref 78–100)
MONOCYTES # BLD AUTO: 0.3 10E3/UL (ref 0–1.3)
MONOCYTES NFR BLD AUTO: 9 %
MUCOUS THREADS #/AREA URNS LPF: PRESENT /LPF
NEUTROPHILS # BLD AUTO: 2.6 10E3/UL (ref 1.6–8.3)
NEUTROPHILS NFR BLD AUTO: 74 %
NITRATE UR QL: NEGATIVE
NRBC # BLD AUTO: 0 10E3/UL
NRBC BLD AUTO-RTO: 0 /100
PH UR STRIP: 5 [PH] (ref 5–7)
PLATELET # BLD AUTO: 52 10E3/UL (ref 150–450)
POTASSIUM SERPL-SCNC: 4.2 MMOL/L (ref 3.4–5.3)
PROT SERPL-MCNC: 7.1 G/DL (ref 6.4–8.3)
RBC # BLD AUTO: 3.35 10E6/UL (ref 4.4–5.9)
RBC URINE: 4 /HPF
RETICS # AUTO: 0.05 10E6/UL (ref 0.03–0.1)
RETICS/RBC NFR AUTO: 1.4 % (ref 0.5–2)
SODIUM SERPL-SCNC: 144 MMOL/L (ref 135–145)
SP GR UR STRIP: 1.02 (ref 1–1.03)
SPECIMEN EXPIRATION DATE: NORMAL
T4 FREE SERPL-MCNC: 1.13 NG/DL (ref 0.9–1.7)
TSH SERPL DL<=0.005 MIU/L-ACNC: 5.38 UIU/ML (ref 0.3–4.2)
UROBILINOGEN UR STRIP-MCNC: 2 MG/DL
WBC # BLD AUTO: 3.4 10E3/UL (ref 4–11)
WBC URINE: 0 /HPF

## 2024-04-16 PROCEDURE — 82747 ASSAY OF FOLIC ACID RBC: CPT | Mod: 90 | Performed by: INTERNAL MEDICINE

## 2024-04-16 PROCEDURE — 81001 URINALYSIS AUTO W/SCOPE: CPT | Performed by: INTERNAL MEDICINE

## 2024-04-16 PROCEDURE — 82784 ASSAY IGA/IGD/IGG/IGM EACH: CPT | Performed by: INTERNAL MEDICINE

## 2024-04-16 PROCEDURE — 87522 HEPATITIS C REVRS TRNSCRPJ: CPT | Performed by: INTERNAL MEDICINE

## 2024-04-16 PROCEDURE — 86364 TISS TRNSGLTMNASE EA IG CLAS: CPT | Performed by: INTERNAL MEDICINE

## 2024-04-16 PROCEDURE — 86140 C-REACTIVE PROTEIN: CPT | Performed by: INTERNAL MEDICINE

## 2024-04-16 PROCEDURE — 99204 OFFICE O/P NEW MOD 45 MIN: CPT | Performed by: INTERNAL MEDICINE

## 2024-04-16 PROCEDURE — 83540 ASSAY OF IRON: CPT | Performed by: INTERNAL MEDICINE

## 2024-04-16 PROCEDURE — 86334 IMMUNOFIX E-PHORESIS SERUM: CPT | Performed by: PATHOLOGY

## 2024-04-16 PROCEDURE — 86905 BLOOD TYPING RBC ANTIGENS: CPT | Performed by: INTERNAL MEDICINE

## 2024-04-16 PROCEDURE — 84165 PROTEIN E-PHORESIS SERUM: CPT | Performed by: PATHOLOGY

## 2024-04-16 PROCEDURE — 84443 ASSAY THYROID STIM HORMONE: CPT | Performed by: INTERNAL MEDICINE

## 2024-04-16 PROCEDURE — 86038 ANTINUCLEAR ANTIBODIES: CPT | Performed by: INTERNAL MEDICINE

## 2024-04-16 PROCEDURE — 87340 HEPATITIS B SURFACE AG IA: CPT | Performed by: INTERNAL MEDICINE

## 2024-04-16 PROCEDURE — 99207 BLOOD MORPHOLOGY PATHOLOGIST REVIEW: CPT | Performed by: PATHOLOGY

## 2024-04-16 PROCEDURE — 88184 FLOWCYTOMETRY/ TC 1 MARKER: CPT | Performed by: INTERNAL MEDICINE

## 2024-04-16 PROCEDURE — 82525 ASSAY OF COPPER: CPT | Mod: 90 | Performed by: INTERNAL MEDICINE

## 2024-04-16 PROCEDURE — 85652 RBC SED RATE AUTOMATED: CPT | Performed by: INTERNAL MEDICINE

## 2024-04-16 PROCEDURE — 82248 BILIRUBIN DIRECT: CPT | Performed by: INTERNAL MEDICINE

## 2024-04-16 PROCEDURE — 86706 HEP B SURFACE ANTIBODY: CPT | Performed by: INTERNAL MEDICINE

## 2024-04-16 PROCEDURE — 84439 ASSAY OF FREE THYROXINE: CPT | Performed by: INTERNAL MEDICINE

## 2024-04-16 PROCEDURE — 86431 RHEUMATOID FACTOR QUANT: CPT | Performed by: INTERNAL MEDICINE

## 2024-04-16 PROCEDURE — 84630 ASSAY OF ZINC: CPT | Mod: 90 | Performed by: INTERNAL MEDICINE

## 2024-04-16 PROCEDURE — 83550 IRON BINDING TEST: CPT | Performed by: INTERNAL MEDICINE

## 2024-04-16 PROCEDURE — 85045 AUTOMATED RETICULOCYTE COUNT: CPT | Performed by: INTERNAL MEDICINE

## 2024-04-16 PROCEDURE — 82607 VITAMIN B-12: CPT | Performed by: INTERNAL MEDICINE

## 2024-04-16 PROCEDURE — 84155 ASSAY OF PROTEIN SERUM: CPT | Mod: 59 | Performed by: INTERNAL MEDICINE

## 2024-04-16 PROCEDURE — 86803 HEPATITIS C AB TEST: CPT | Performed by: INTERNAL MEDICINE

## 2024-04-16 PROCEDURE — 87389 HIV-1 AG W/HIV-1&-2 AB AG IA: CPT | Performed by: INTERNAL MEDICINE

## 2024-04-16 PROCEDURE — 88189 FLOWCYTOMETRY/READ 16 & >: CPT | Mod: GC | Performed by: STUDENT IN AN ORGANIZED HEALTH CARE EDUCATION/TRAINING PROGRAM

## 2024-04-16 PROCEDURE — 86200 CCP ANTIBODY: CPT | Performed by: INTERNAL MEDICINE

## 2024-04-16 PROCEDURE — 36415 COLL VENOUS BLD VENIPUNCTURE: CPT | Performed by: INTERNAL MEDICINE

## 2024-04-16 PROCEDURE — 82728 ASSAY OF FERRITIN: CPT | Performed by: INTERNAL MEDICINE

## 2024-04-16 PROCEDURE — 99000 SPECIMEN HANDLING OFFICE-LAB: CPT | Performed by: INTERNAL MEDICINE

## 2024-04-16 PROCEDURE — 85025 COMPLETE CBC W/AUTO DIFF WBC: CPT | Performed by: INTERNAL MEDICINE

## 2024-04-16 PROCEDURE — 83521 IG LIGHT CHAINS FREE EACH: CPT | Performed by: INTERNAL MEDICINE

## 2024-04-16 PROCEDURE — 88185 FLOWCYTOMETRY/TC ADD-ON: CPT | Performed by: INTERNAL MEDICINE

## 2024-04-16 PROCEDURE — 80053 COMPREHEN METABOLIC PANEL: CPT | Performed by: INTERNAL MEDICINE

## 2024-04-16 PROCEDURE — 86704 HEP B CORE ANTIBODY TOTAL: CPT | Performed by: INTERNAL MEDICINE

## 2024-04-16 PROCEDURE — 83615 LACTATE (LD) (LDH) ENZYME: CPT | Performed by: INTERNAL MEDICINE

## 2024-04-16 NOTE — LETTER
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 2/23, splenomegaly 18.6 cm 4/23, hypertension, hyperlipidemia, type 2 diabetes, CKD stage III, hypothyroidism, fall w/compression fracture of thoracic vertebrae 2/23, GERD, SHONDA, osteoarthritis, cholelithiasis, asymmetric left ventricular septal hypertrophy 12/23, benign renal cysts, colon diverticulosis    Regarding liver cirrhosis:  - Patient was diagnosed in 5/2008 via liver biopsy  - Hepatitis C diagnosed 2004, s/p SVR  - Also likely component of NAFLD with obesity    Patient has pancytopenia  Regarding leukopenia:  - 7/2008-2/2020 WBC normal, 4/2020-6/2023 mild intermittent leukopenia with WBC ranging from 3.1-7.4, 8/2023-4/2024 WBC 3.0-3.8  - Differential with mild lymphopenia, ALC 0.6-0.7    Regarding anemia:  -7/2008-7/2021 hemoglobin 13-15, 11/2021-12/2023 hemoglobin 12-13 with MCV 90s, 2/2024 hemoglobin 10.9, 4/2024 hemoglobin 11.8, MCV 90s-103    Regarding thrombocytopenia:  - Patient has chronic thrombocytopenia  - 7/2008-9/2020 platelet 60K-100K, 1/2001-4/2024 platelet 45K-70K    Other pertinent labs:  - 4/24 labs:  WBC 3.4, ALC 0.7, hemoglobin 11.8, , platelet 48 K  Creatinine 1.31, GFR 58, calcium 9.3, total protein 7.3, albumin 4.0, total bilirubin 1.3  AST 47, ALT 47, alk phos 153  TSH 4.94  -8/2023 labs:  Ferritin 214, iron saturation index 30, TIBC 319, iron 97  B12 486  - 1/23 SIFE no monoclonal protein  -8/2022  labs:  Rheumatoid factor 9, CRISS negative  - 4/2020: HIV antigen antibody combination negative  - 3/2014 hep C RNA quantification <12IU/mL    Pt denies melena, hematochezia, hematuria, hematemesis, purpura, ecchymosis.   Pt denies use of NSAIDs including aspirin, blood thinners.    - 4/23 abdominal ultrasound: Splenomegaly 18.6 cm  - 5/23 EGD: Mild portal hypertensive gastropathy and gastric cardia, gastric fundus and gastric body  - 9/22 colonoscopy: Diverticulosis of sigmoid colon and descending colon, follow-up 5 years    Patient denies the following:  Family history of hematologic disorders  Chronic illnesses  Ulcers, gingivitis, infections  B symptoms  GI disorders or bariatric surgery that can cause impaired absorption  Occupational exposure  Recent travel history  Extensive alcohol  Illicit drugs  New medications  Use of herbs  Use of supplements    Pt has the following:  Recent vaccination- up to date on all vaccines  Fatigue, MCKENNA that have been present since 2/2023. He also has neuropathy causing lack of balance with neck stiffness, leading to fall with compression fractures     Regarding liver cirrhosis:  - Patient was diagnosed in 5/2008 via liver biopsy  - Hepatitis C diagnosed 2004, s/p SVR  - Also likely component of NAFLD with obesity  - Last AFP 4/23 <1.8        REVIEW OF SYSTEMS:   A 14 point ROS was reviewed with pertinent positives and negatives in the HPI.        HOME MEDICATIONS:  Current Outpatient Medications   Medication Sig Dispense Refill     atorvastatin (LIPITOR) 10 MG tablet Take 1 tablet (10 mg) by mouth daily 90 tablet 1     azithromycin (ZITHROMAX) 250 MG tablet Take 2 tablets (500 mg) by mouth daily for 1 day, THEN 1 tablet (250 mg) daily for 4 days. 6 tablet 1     diclofenac (VOLTAREN) 1 % topical gel Apply 4 g topically 3 times daily as needed for moderate pain (bursitis) 150 g 1     hydrochlorothiazide (HYDRODIURIL) 12.5 MG tablet Take 1 tablet (12.5 mg) by mouth daily 90 tablet 1      levothyroxine (SYNTHROID/LEVOTHROID) 25 MCG tablet Take 1 tablet (25 mcg) by mouth daily 90 tablet 1     metFORMIN (GLUCOPHAGE) 500 MG tablet Take 1 tablet (500 mg) by mouth 2 times daily (with meals) 180 tablet 1     metoprolol succinate ER (TOPROL XL) 100 MG 24 hr tablet 1 1/2 ( 150 mg ) po daily 135 tablet 1     multivitamin w/minerals (THERA-VIT-M) tablet Take 1 tablet by mouth daily       omeprazole (PRILOSEC) 20 MG DR capsule TAKE 1 CAPSULE BY MOUTH ONCE DAILY 30 TO 60 MINUTES BEFORE A MEAL 90 capsule 1     spironolactone (ALDACTONE) 25 MG tablet Take 1 tablet (25 mg) by mouth daily 90 tablet 1     tamsulosin (FLOMAX) 0.4 MG capsule Take 1 capsule (0.4 mg) by mouth daily       tiZANidine (ZANAFLEX) 4 MG tablet TAKE 1/2 (ONE-HALF) TO 1  TABLET BY MOUTH UP TO THREE TIMES DAILY AS NEEDED 30 tablet 0     traMADol (ULTRAM) 50 MG tablet TAKE 1 TABLET BY MOUTH EVERY 6 HOURS AS NEEDED FOR MODERATE TO SEVERE PAIN 30 tablet 0     valsartan (DIOVAN) 160 MG tablet Take 1 tablet (160 mg) by mouth daily 90 tablet 1     cholecalciferol 25 MCG (1000 UT) TABS Take 1,000 Units by mouth daily 2 tabs daily (Patient not taking: Reported on 3/5/2024)       polyethylene glycol (MIRALAX) 17 g packet Take 17 g by mouth daily as needed for constipation (Patient not taking: Reported on 3/5/2024) 30 packet 0     pregabalin (LYRICA) 75 MG capsule Take 1 capsule (75 mg) by mouth 3 times daily as needed (pain) (Patient not taking: Reported on 4/16/2024) 90 capsule 1     semaglutide (OZEMPIC, 0.25 OR 0.5 MG/DOSE,) 2 MG/3ML pen INJECT 0.5 MG SUBCUTANEOUSLY ONCE EVERY 7 DAYS (Patient not taking: Reported on 4/16/2024) 3 mL 3         ALLERGIES:  Allergies   Allergen Reactions     Bee Venom      Influenza Vaccines Other (See Comments)     delirium  fever           PAST MEDICAL HISTORY:  Past Medical History:   Diagnosis Date     Arthritis      Chronic, continuous use of opioids      Esophageal reflux 03/01/2014     Hearing problem       Hiatal hernia      Hypertension      Jaundice 05/31/2008     Liver disease      Obesity 01/24/2013     Obstructive sleep apnea      Sleep apnea     Advised CPAP machine. Not keen to use it.      Syncope, unspecified syncope type 2/20/2023         PAST SURGICAL HISTORY:  Past Surgical History:   Procedure Laterality Date     ARTHROSCOPY KNEE RT/LT      (L) with partial medial meniscectomy     CATARACT IOL, RT/LT  Nov and Dec 2017     COLONOSCOPY N/A 4/24/2019    Procedure: COLONOSCOPY, WITH POLYPECTOMY AND BIOPSY;  Surgeon: Leventhal, Thomas Michael, MD;  Location: [facility]     COLONOSCOPY N/A 9/14/2022    Procedure: COLONOSCOPY;  Surgeon: Rafa Renee MD;  Location:  GI     DACRYOCYSTORHINOSTOMY Left 10/16/2018    Procedure: DACRYOCYSTORHINOSTOMY;  Surgeon: Madhu Krause MD;  Location: [facility]     ENDOSCOPIC ENDONASAL SURGERY  1994     ENDOSCOPY  2-19-15     ESOPHAGOSCOPY, GASTROSCOPY, DUODENOSCOPY (EGD), COMBINED N/A 4/24/2019    Procedure: COMBINED ESOPHAGOSCOPY, GASTROSCOPY, DUODENOSCOPY (EGD) - hold aspirin ibuprofen or naproxen for one week prior (per physician order);  Surgeon: Leventhal, Thomas Michael, MD;  Location: [facility] OR     ESOPHAGOSCOPY, GASTROSCOPY, DUODENOSCOPY (EGD), COMBINED N/A 5/23/2023    Procedure: Esophagoscopy, gastroscopy, duodenoscopy, combined;  Surgeon: Rafa Renee MD;  Location:  GI     EYE SURGERY       Hernia surgery Left 1994     NASAL/SINUS POLYPECTOMY       REPAIR PTOSIS Left 10/16/2018    Procedure: LEFT UPPER LID PTOSIS AND BILATERAL  BROW PTOSIS REPAIR WITH LEFT DACRYOCYSTORHINOSTOMY ;  Surgeon: Madhu Krause MD;  Location: [facility]     REPAIR PTOSIS BROW Bilateral 10/16/2018    Procedure: REPAIR PTOSIS BROW;  Surgeon: Madhu Krause MD;  Location: [facility]     ROTATOR CUFF REPAIR RT/LT Right      ZZC OPEN RX ANKLE DISLOCATN+FIXATN      (R)     ZZC SPINAL FUSION,ANT,EA ADNL LEVEL      T12 - L1         SOCIAL HISTORY:  Social History     Socioeconomic  History     Marital status: Single     Spouse name: Not on file     Number of children: 0     Years of education: 18     Highest education level: Not on file   Occupational History     Occupation: Contractor     Employer: [employer]     Comment: Not working now   Tobacco Use     Smoking status: Former     Current packs/day: 0.00     Types: Cigarettes     Start date: 1990     Quit date: 1999     Years since quittin.3     Passive exposure: Never     Smokeless tobacco: Never   Vaping Use     Vaping status: Never Used   Substance and Sexual Activity     Alcohol use: Yes     Comment: rare     Drug use: No     Sexual activity: Not Currently     Partners: Female   Other Topics Concern     Parent/sibling w/ CABG, MI or angioplasty before 65F 55M? No   Social History Narrative     Not on file     Social Determinants of Health     Financial Resource Strain: Low Risk  (2023)    Financial Resource Strain      Within the past 12 months, have you or your family members you live with been unable to get utilities (heat, electricity) when it was really needed?: No   Food Insecurity: Low Risk  (2023)    Food Insecurity      Within the past 12 months, did you worry that your food would run out before you got money to buy more?: No      Within the past 12 months, did the food you bought just not last and you didn t have money to get more?: No   Transportation Needs: Low Risk  (2023)    Transportation Needs      Within the past 12 months, has lack of transportation kept you from medical appointments, getting your medicines, non-medical meetings or appointments, work, or from getting things that you need?: No   Physical Activity: Not on file   Stress: Not on file   Social Connections: Unknown (2023)    Received from [facility], [facility]    Social Connections      Frequency of Communication with Friends and Family: Not on file  "  Interpersonal Safety: Low Risk  (10/10/2023)    Interpersonal Safety      Do you feel physically and emotionally safe where you currently live?: Yes      Within the past 12 months, have you been hit, slapped, kicked or otherwise physically hurt by someone?: No      Within the past 12 months, have you been humiliated or emotionally abused in other ways by your partner or ex-partner?: No   Housing Stability: Low Risk  (12/20/2023)    Housing Stability      Do you have housing? : Yes      Are you worried about losing your housing?: No         FAMILY HISTORY:  Family History   Problem Relation Age of Onset     Alzheimer Disease Mother 85     Dementia Mother      Cerebrovascular Disease Father 50     Cancer Father      Hypertension Father      Neurologic Disorder No family hx of      Diabetes No family hx of          PHYSICAL EXAM:  Vital signs:  BP (!) 150/62   Temp 98.7  F (37.1  C) (Temporal)   Ht 1.803 m (5' 11\")   Wt 117.6 kg (259 lb 4.8 oz)   BMI 36.17 kg/m       GENERAL/CONSTITUTIONAL: No acute distress.  EYES: Pupils are equal and round. Extraocular movements intact without nystagmus.  No scleral icterus.  RESPIRATORY: Equal chest rise.   MUSCULOSKELETAL: Warm and well-perfused, no cyanosis, clubbing, or edema.   NEUROLOGIC: Cranial nerves are grossly intact. Alert, oriented to person, place and time, answers questions appropriately.  INTEGUMENTARY: No rashes or jaundice.        LABS:  CBC RESULTS:   Recent Labs   Lab Test 04/09/24 0913   WBC 3.4*   RBC 3.67*   HGB 11.8*   HCT 36.7*      MCH 32.2   MCHC 32.2   RDW 13.7   PLT 48*       Recent Labs   Lab Test 04/09/24  0913 02/20/24  1138   * 143   POTASSIUM 4.7 4.4   CHLORIDE 110* 111*   CO2 27 22   ANIONGAP 9 10   * 160*   BUN 28.0* 24.5*   CR 1.31* 1.24*   SOLEDAD 9.3 8.7*         PATHOLOGY:      IMAGING:      ASSESSMENT/PLAN:  Gucci Logan is a 73 year old male with:    #Pancytopenia  #Leukopenia with lymphopenia  #Borderline " macrocytic anemia  #Chronic thrombocytopenia  -Regarding leukopenia with lymphopenia: 7/2008-2/2020 WBC normal, 4/2020-6/2023 mild intermittent leukopenia with WBC ranging from 3.1-7.4, 8/2023-4/2024 WBC 3.0-3.8; Differential with mild lymphopenia, ALC 0.6-0.7  - Regarding borderline macrocytic anemia: 7/2008-7/2021 hemoglobin 13-15, 11/2021-12/2023 hemoglobin 12-13 with MCV 90s, 2/2024 hemoglobin 10.9, 4/2024 hemoglobin 11.8, MCV 90s-103  - Regarding chronic thrombocytopenia: 7/2008-9/2020 platelet 60K-100K, 1/2001-4/2024 platelet 45 K-70 K  - 4/24 labs:  WBC 3.4, ALC 0.7, hemoglobin 11.8, , platelet 48 K  Creatinine 1.31, GFR 58, calcium 9.3, total protein 7.3, albumin 4.0, total bilirubin 1.3  AST 47, ALT 47, alk phos 153  TSH 4.94  -8/2023 labs:  Ferritin 214, iron saturation index 30, TIBC 319, iron 97  B12 486  - 1/23 SIFE no monoclonal protein  -8/2022 labs:  Rheumatoid factor 9, CRISS negative  - 4/2020: HIV antigen antibody combination negative  - 3/2014 hep C RNA quantification <12IU/mL    - 4/23 abdominal ultrasound: Splenomegaly 18.6 cm  - 5/23 EGD: Mild portal hypertensive gastropathy and gastric cardia, gastric fundus and gastric body  - 9/22 colonoscopy: Diverticulosis of sigmoid colon and descending colon, follow-up 5 years    PLAN:  -The above is in the setting of longstanding liver cirrhosis since 2008, splenomegaly , CKD stage III  -Check the following labs:  Ferritin, iron studies  B12, RBC folate, copper, zinc  TSH  CMP, retic, LDH, direct bilirubin  Peripheral smear  UA blood and RBC  celiac screen with tissue transglutaminase IgG and IgA  hepatitis B, hepatitis C with RNA quantification, HIV  coagulation studies  ESR, CRP, rheumatoid factor, CRISS, anti-CCP antibody  Peripheral flow cytometry for LGL  RBC antigen for Koo null associated neutropenia/benign ethnic neutropenia   SPEP, SIFE, 24 hour urine UPEP and UIFE, kappa and lambda light chain ratio, quantitative serum immunoglobulins  -  if reversible etiology is not found, will consider bone marrow biopsy    #Liver cirrhosis  #History of hepatitis C infection  #Nonalcoholic fatty liver disease  #Splenomegaly 18.6 cm 4/23     RTC 4-6 weeks for follow up with me    Isa Fernandez DO  Hematology/Oncology  [facility]    Future Appointments   Date Time Provider Department Center   4/16/2024 12:45 PM Isa Fernandez DO [facility]   4/30/2024 10:00 AM Richi Jaramillo MD [facility]   4/30/2024  2:10 PM Gm Coe MD [facility]   10/14/2024  1:30 PM Mata Renteria MD [facility]            ISA FERNANDEZ DO

## 2024-04-16 NOTE — LETTER
4/16/2024         RE: Gucci Logan  [address] [facility] [address]        Dear Colleague,    Thank you for referring your patient, Gucci Logan, to the [facility]. Please see a copy of my visit note below.    [facility]    Hematology/Oncology New Patient Note      Today's Date: April 16, 2024     Referring provider: Richi Jaramillo MD   Reason for Consultation: anemia     HISTORY OF PRESENT ILLNESS: Gucci Logan is a 73 year old male who was referred to the Hematology/Oncology Clinic for anemia     Patient has medical history including obesity, liver cirrhosis  2/2 NAFLD and hepatitis C with portal hypertension, chronic thrombus of SMV and portal vein 2/23, splenomegaly 18.6 cm 4/23, hypertension, hyperlipidemia, type 2 diabetes, CKD stage III, hypothyroidism, fall w/compression fracture of thoracic vertebrae 2/23, GERD, SHONDA, osteoarthritis, cholelithiasis, asymmetric left ventricular septal hypertrophy 12/23, benign renal cysts, colon diverticulosis    Regarding liver cirrhosis:  - Patient was diagnosed in 5/2008 via liver biopsy  - Hepatitis C diagnosed 2004, s/p SVR  - Also likely component of NAFLD with obesity    Patient has pancytopenia  Regarding leukopenia:  - 7/2008-2/2020 WBC normal, 4/2020-6/2023 mild intermittent leukopenia with WBC ranging from 3.1-7.4, 8/2023-4/2024 WBC 3.0-3.8  - Differential with mild lymphopenia, ALC 0.6-0.7    Regarding anemia:  -7/2008-7/2021 hemoglobin 13-15, 11/2021-12/2023 hemoglobin 12-13 with MCV 90s, 2/2024 hemoglobin 10.9, 4/2024 hemoglobin 11.8, MCV 90s-103    Regarding thrombocytopenia:  - Patient has chronic thrombocytopenia  - 7/2008-9/2020 platelet 60K-100K, 1/2001-4/2024 platelet 45K-70K    Other pertinent labs:  - 4/24 labs:  WBC 3.4, ALC 0.7, hemoglobin 11.8, , platelet 48 K  Creatinine 1.31, GFR 58, calcium 9.3, total protein 7.3, albumin 4.0, total bilirubin 1.3  AST  47, ALT 47, alk phos 153  TSH 4.94  -8/2023 labs:  Ferritin 214, iron saturation index 30, TIBC 319, iron 97  B12 486  - 1/23 SIFE no monoclonal protein  -8/2022 labs:  Rheumatoid factor 9, CRISS negative  - 4/2020: HIV antigen antibody combination negative  - 3/2014 hep C RNA quantification <12IU/mL    Pt denies melena, hematochezia, hematuria, hematemesis, purpura, ecchymosis.   Pt denies use of NSAIDs including aspirin, blood thinners.    - 4/23 abdominal ultrasound: Splenomegaly 18.6 cm  - 5/23 EGD: Mild portal hypertensive gastropathy and gastric cardia, gastric fundus and gastric body  - 9/22 colonoscopy: Diverticulosis of sigmoid colon and descending colon, follow-up 5 years    Patient denies the following:  Family history of hematologic disorders  Chronic illnesses  Ulcers, gingivitis, infections  B symptoms  GI disorders or bariatric surgery that can cause impaired absorption  Occupational exposure  Recent travel history  Extensive alcohol  Illicit drugs  New medications  Use of herbs  Use of supplements    Pt has the following:  Recent vaccination- up to date on all vaccines  Fatigue, MCKENNA that have been present since 2/2023. He also has neuropathy causing lack of balance with neck stiffness, leading to fall with compression fractures     Regarding liver cirrhosis:  - Patient was diagnosed in 5/2008 via liver biopsy  - Hepatitis C diagnosed 2004, s/p SVR  - Also likely component of NAFLD with obesity  - Last AFP 4/23 <1.8        REVIEW OF SYSTEMS:   A 14 point ROS was reviewed with pertinent positives and negatives in the HPI.        HOME MEDICATIONS:  Current Outpatient Medications   Medication Sig Dispense Refill     atorvastatin (LIPITOR) 10 MG tablet Take 1 tablet (10 mg) by mouth daily 90 tablet 1     azithromycin (ZITHROMAX) 250 MG tablet Take 2 tablets (500 mg) by mouth daily for 1 day, THEN 1 tablet (250 mg) daily for 4 days. 6 tablet 1     diclofenac (VOLTAREN) 1 % topical gel Apply 4 g topically 3  times daily as needed for moderate pain (bursitis) 150 g 1     hydrochlorothiazide (HYDRODIURIL) 12.5 MG tablet Take 1 tablet (12.5 mg) by mouth daily 90 tablet 1     levothyroxine (SYNTHROID/LEVOTHROID) 25 MCG tablet Take 1 tablet (25 mcg) by mouth daily 90 tablet 1     metFORMIN (GLUCOPHAGE) 500 MG tablet Take 1 tablet (500 mg) by mouth 2 times daily (with meals) 180 tablet 1     metoprolol succinate ER (TOPROL XL) 100 MG 24 hr tablet 1 1/2 ( 150 mg ) po daily 135 tablet 1     multivitamin w/minerals (THERA-VIT-M) tablet Take 1 tablet by mouth daily       omeprazole (PRILOSEC) 20 MG DR capsule TAKE 1 CAPSULE BY MOUTH ONCE DAILY 30 TO 60 MINUTES BEFORE A MEAL 90 capsule 1     spironolactone (ALDACTONE) 25 MG tablet Take 1 tablet (25 mg) by mouth daily 90 tablet 1     tamsulosin (FLOMAX) 0.4 MG capsule Take 1 capsule (0.4 mg) by mouth daily       tiZANidine (ZANAFLEX) 4 MG tablet TAKE 1/2 (ONE-HALF) TO 1  TABLET BY MOUTH UP TO THREE TIMES DAILY AS NEEDED 30 tablet 0     traMADol (ULTRAM) 50 MG tablet TAKE 1 TABLET BY MOUTH EVERY 6 HOURS AS NEEDED FOR MODERATE TO SEVERE PAIN 30 tablet 0     valsartan (DIOVAN) 160 MG tablet Take 1 tablet (160 mg) by mouth daily 90 tablet 1     cholecalciferol 25 MCG (1000 UT) TABS Take 1,000 Units by mouth daily 2 tabs daily (Patient not taking: Reported on 3/5/2024)       polyethylene glycol (MIRALAX) 17 g packet Take 17 g by mouth daily as needed for constipation (Patient not taking: Reported on 3/5/2024) 30 packet 0     pregabalin (LYRICA) 75 MG capsule Take 1 capsule (75 mg) by mouth 3 times daily as needed (pain) (Patient not taking: Reported on 4/16/2024) 90 capsule 1     semaglutide (OZEMPIC, 0.25 OR 0.5 MG/DOSE,) 2 MG/3ML pen INJECT 0.5 MG SUBCUTANEOUSLY ONCE EVERY 7 DAYS (Patient not taking: Reported on 4/16/2024) 3 mL 3         ALLERGIES:  Allergies   Allergen Reactions     Bee Venom      Influenza Vaccines Other (See Comments)     delirium  fever           PAST MEDICAL  HISTORY:  Past Medical History:   Diagnosis Date     Arthritis      Chronic, continuous use of opioids      Esophageal reflux 03/01/2014     Hearing problem      Hiatal hernia      Hypertension      Jaundice 05/31/2008     Liver disease      Obesity 01/24/2013     Obstructive sleep apnea      Sleep apnea     Advised CPAP machine. Not keen to use it.      Syncope, unspecified syncope type 2/20/2023         PAST SURGICAL HISTORY:  Past Surgical History:   Procedure Laterality Date     ARTHROSCOPY KNEE RT/LT      (L) with partial medial meniscectomy     CATARACT IOL, RT/LT  Nov and Dec 2017     COLONOSCOPY N/A 4/24/2019    Procedure: COLONOSCOPY, WITH POLYPECTOMY AND BIOPSY;  Surgeon: Leventhal, Thomas Michael, MD;  Location: [facility]     COLONOSCOPY N/A 9/14/2022    Procedure: COLONOSCOPY;  Surgeon: Rafa Renee MD;  Location:  GI     DACRYOCYSTORHINOSTOMY Left 10/16/2018    Procedure: DACRYOCYSTORHINOSTOMY;  Surgeon: Madhu Krause MD;  Location: [facility]     ENDOSCOPIC ENDONASAL SURGERY  1994     ENDOSCOPY  2-19-15     ESOPHAGOSCOPY, GASTROSCOPY, DUODENOSCOPY (EGD), COMBINED N/A 4/24/2019    Procedure: COMBINED ESOPHAGOSCOPY, GASTROSCOPY, DUODENOSCOPY (EGD) - hold aspirin ibuprofen or naproxen for one week prior (per physician order);  Surgeon: Leventhal, Thomas Michael, MD;  Location: [facility] OR     ESOPHAGOSCOPY, GASTROSCOPY, DUODENOSCOPY (EGD), COMBINED N/A 5/23/2023    Procedure: Esophagoscopy, gastroscopy, duodenoscopy, combined;  Surgeon: Rafa Renee MD;  Location:  GI     EYE SURGERY       Hernia surgery Left 1994     NASAL/SINUS POLYPECTOMY       REPAIR PTOSIS Left 10/16/2018    Procedure: LEFT UPPER LID PTOSIS AND BILATERAL  BROW PTOSIS REPAIR WITH LEFT DACRYOCYSTORHINOSTOMY ;  Surgeon: Madhu Krause MD;  Location: [facility]     REPAIR PTOSIS BROW Bilateral 10/16/2018    Procedure: REPAIR PTOSIS BROW;  Surgeon: Madhu Krause MD;  Location: [facility]     ROTATOR CUFF REPAIR RT/LT Right       ZZC OPEN RX ANKLE DISLOCATN+FIXATN      (R)     ZZC SPINAL FUSION,ANT,EA ADNL LEVEL      T12 - L1         SOCIAL HISTORY:  Social History     Socioeconomic History     Marital status: Single     Spouse name: Not on file     Number of children: 0     Years of education: 18     Highest education level: Not on file   Occupational History     Occupation: Contractor     Employer: [employer]     Comment: Not working now   Tobacco Use     Smoking status: Former     Current packs/day: 0.00     Types: Cigarettes     Start date: 1990     Quit date: 1999     Years since quittin.3     Passive exposure: Never     Smokeless tobacco: Never   Vaping Use     Vaping status: Never Used   Substance and Sexual Activity     Alcohol use: Yes     Comment: rare     Drug use: No     Sexual activity: Not Currently     Partners: Female   Other Topics Concern     Parent/sibling w/ CABG, MI or angioplasty before 65F 55M? No   Social History Narrative     Not on file     Social Determinants of Health     Financial Resource Strain: Low Risk  (2023)    Financial Resource Strain      Within the past 12 months, have you or your family members you live with been unable to get utilities (heat, electricity) when it was really needed?: No   Food Insecurity: Low Risk  (2023)    Food Insecurity      Within the past 12 months, did you worry that your food would run out before you got money to buy more?: No      Within the past 12 months, did the food you bought just not last and you didn t have money to get more?: No   Transportation Needs: Low Risk  (2023)    Transportation Needs      Within the past 12 months, has lack of transportation kept you from medical appointments, getting your medicines, non-medical meetings or appointments, work, or from getting things that you need?: No   Physical Activity: Not on file   Stress: Not on file   Social Connections: Unknown (2023)    Received from [facility], [facility]    Social Connections      Frequency of Communication with Friends and Family: Not on file   Interpersonal Safety: Low Risk  (10/10/2023)    Interpersonal Safety      Do you feel physically and emotionally safe where you currently live?: Yes      Within the past 12 months, have you been hit, slapped, kicked or otherwise physically hurt by someone?: No      Within the past 12 months, have you been humiliated or emotionally abused in other ways by your partner or ex-partner?: No   Housing Stability: Low Risk  (12/20/2023)    Housing Stability      Do you have housing? : Yes      Are you worried about losing your housing?: No         FAMILY HISTORY:  Family History   Problem Relation Age of Onset     Alzheimer Disease Mother 85     Dementia Mother      Cerebrovascular Disease Father 50     Cancer Father      Hypertension Father      Neurologic Disorder No family hx of      Diabetes No family hx of          PHYSICAL EXAM:  Vital signs:  BP (!) 150/62   Temp 98.7  F (37.1  C) (Temporal)   Ht 1.803 m (5' 11\")   Wt 117.6 kg (259 lb 4.8 oz)   BMI 36.17 kg/m       GENERAL/CONSTITUTIONAL: No acute distress.  EYES: Pupils are equal and round. Extraocular movements intact without nystagmus.  No scleral icterus.  RESPIRATORY: Equal chest rise.   MUSCULOSKELETAL: Warm and well-perfused, no cyanosis, clubbing, or edema.   NEUROLOGIC: Cranial nerves are grossly intact. Alert, oriented to person, place and time, answers questions appropriately.  INTEGUMENTARY: No rashes or jaundice.        LABS:  CBC RESULTS:   Recent Labs   Lab Test 04/09/24 0913   WBC 3.4*   RBC 3.67*   HGB 11.8*   HCT 36.7*      MCH 32.2   MCHC 32.2   RDW 13.7   PLT 48*       Recent Labs   Lab Test 04/09/24  0913 02/20/24  1138   * 143   POTASSIUM 4.7 4.4   CHLORIDE 110* 111*   CO2 27 22   ANIONGAP 9 10   * 160*   BUN 28.0* 24.5*   CR 1.31* 1.24*   SOLEDAD 9.3 8.7* "         PATHOLOGY:      IMAGING:      ASSESSMENT/PLAN:  Gucci Logan is a 73 year old male with:    #Pancytopenia  #Leukopenia with lymphopenia  #Borderline macrocytic anemia  #Chronic thrombocytopenia  -Regarding leukopenia with lymphopenia: 7/2008-2/2020 WBC normal, 4/2020-6/2023 mild intermittent leukopenia with WBC ranging from 3.1-7.4, 8/2023-4/2024 WBC 3.0-3.8; Differential with mild lymphopenia, ALC 0.6-0.7  - Regarding borderline macrocytic anemia: 7/2008-7/2021 hemoglobin 13-15, 11/2021-12/2023 hemoglobin 12-13 with MCV 90s, 2/2024 hemoglobin 10.9, 4/2024 hemoglobin 11.8, MCV 90s-103  - Regarding chronic thrombocytopenia: 7/2008-9/2020 platelet 60K-100K, 1/2001-4/2024 platelet 45 K-70 K  - 4/24 labs:  WBC 3.4, ALC 0.7, hemoglobin 11.8, , platelet 48 K  Creatinine 1.31, GFR 58, calcium 9.3, total protein 7.3, albumin 4.0, total bilirubin 1.3  AST 47, ALT 47, alk phos 153  TSH 4.94  -8/2023 labs:  Ferritin 214, iron saturation index 30, TIBC 319, iron 97  B12 486  - 1/23 SIFE no monoclonal protein  -8/2022 labs:  Rheumatoid factor 9, CRISS negative  - 4/2020: HIV antigen antibody combination negative  - 3/2014 hep C RNA quantification <12IU/mL    - 4/23 abdominal ultrasound: Splenomegaly 18.6 cm  - 5/23 EGD: Mild portal hypertensive gastropathy and gastric cardia, gastric fundus and gastric body  - 9/22 colonoscopy: Diverticulosis of sigmoid colon and descending colon, follow-up 5 years    PLAN:  -The above is in the setting of longstanding liver cirrhosis since 2008, splenomegaly , CKD stage III  -Check the following labs:  Ferritin, iron studies  B12, RBC folate, copper, zinc  TSH  CMP, retic, LDH, direct bilirubin  Peripheral smear  UA blood and RBC  celiac screen with tissue transglutaminase IgG and IgA  hepatitis B, hepatitis C with RNA quantification, HIV  coagulation studies  ESR, CRP, rheumatoid factor, CRISS, anti-CCP antibody  Peripheral flow cytometry for LGL  RBC antigen for Koo null  associated neutropenia/benign ethnic neutropenia   SPEP, SIFE, 24 hour urine UPEP and UIFE, kappa and lambda light chain ratio, quantitative serum immunoglobulins  - if reversible etiology is not found, will consider bone marrow biopsy    #Liver cirrhosis  #History of hepatitis C infection  #Nonalcoholic fatty liver disease  #Splenomegaly 18.6 cm 4/23     RTC 4-6 weeks for follow up with me    Isa Fernandez DO  Hematology/Oncology  [facility]    Future Appointments   Date Time Provider Department Center   4/16/2024 12:45 PM Isa Fernandez DO [facility]   4/30/2024 10:00 AM Richi Jaramillo MD [facility]   4/30/2024  2:10 PM Gm Coe MD [facility]   10/14/2024  1:30 PM Mata Renteria MD [facility]          Again, thank you for allowing me to participate in the care of your patient.        Sincerely,        ISA FERNANDEZ DO

## 2024-04-16 NOTE — PROGRESS NOTES
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 2/23, splenomegaly 18.6 cm 4/23, hypertension, hyperlipidemia, type 2 diabetes, CKD stage III, hypothyroidism, fall w/compression fracture of thoracic vertebrae 2/23, GERD, SHONDA, osteoarthritis, cholelithiasis, asymmetric left ventricular septal hypertrophy 12/23, benign renal cysts, colon diverticulosis    Regarding liver cirrhosis:  - Patient was diagnosed in 5/2008 via liver biopsy  - Hepatitis C diagnosed 2004, s/p SVR  - Also likely component of NAFLD with obesity    Patient has pancytopenia  Regarding leukopenia:  - 7/2008-2/2020 WBC normal, 4/2020-6/2023 mild intermittent leukopenia with WBC ranging from 3.1-7.4, 8/2023-4/2024 WBC 3.0-3.8  - Differential with mild lymphopenia, ALC 0.6-0.7    Regarding anemia:  -7/2008-7/2021 hemoglobin 13-15, 11/2021-12/2023 hemoglobin 12-13 with MCV 90s, 2/2024 hemoglobin 10.9, 4/2024 hemoglobin 11.8, MCV 90s-103    Regarding thrombocytopenia:  - Patient has chronic thrombocytopenia  - 7/2008-9/2020 platelet 60K-100K, 1/2001-4/2024 platelet 45K-70K    Other pertinent labs:  - 4/24 labs:  WBC 3.4, ALC 0.7, hemoglobin 11.8, , platelet 48 K  Creatinine 1.31, GFR 58, calcium 9.3, total protein 7.3, albumin 4.0, total bilirubin 1.3  AST 47, ALT 47, alk phos 153  TSH 4.94  -8/2023 labs:  Ferritin 214, iron saturation index 30, TIBC 319, iron 97  B12 486  - 1/23 SIFE no monoclonal protein  -8/2022 labs:  Rheumatoid factor 9, CRISS negative  - 4/2020: HIV antigen antibody combination negative  - 3/2014  hep C RNA quantification <12IU/mL    Pt denies melena, hematochezia, hematuria, hematemesis, purpura, ecchymosis.   Pt denies use of NSAIDs including aspirin, blood thinners.    - 4/23 abdominal ultrasound: Splenomegaly 18.6 cm  - 5/23 EGD: Mild portal hypertensive gastropathy and gastric cardia, gastric fundus and gastric body  - 9/22 colonoscopy: Diverticulosis of sigmoid colon and descending colon, follow-up 5 years    Patient denies the following:  Family history of hematologic disorders  Chronic illnesses  Ulcers, gingivitis, infections  B symptoms  GI disorders or bariatric surgery that can cause impaired absorption  Occupational exposure  Recent travel history  Extensive alcohol  Illicit drugs  New medications  Use of herbs  Use of supplements    Pt has the following:  Recent vaccination- up to date on all vaccines  Fatigue, MCKENNA that have been present since 2/2023. He also has neuropathy causing lack of balance with neck stiffness, leading to fall with compression fractures     Regarding liver cirrhosis:  - Patient was diagnosed in 5/2008 via liver biopsy  - Hepatitis C diagnosed 2004, s/p SVR  - Also likely component of NAFLD with obesity  - Last AFP 4/23 <1.8        REVIEW OF SYSTEMS:   A 14 point ROS was reviewed with pertinent positives and negatives in the HPI.        HOME MEDICATIONS:  Current Outpatient Medications   Medication Sig Dispense Refill    atorvastatin (LIPITOR) 10 MG tablet Take 1 tablet (10 mg) by mouth daily 90 tablet 1    azithromycin (ZITHROMAX) 250 MG tablet Take 2 tablets (500 mg) by mouth daily for 1 day, THEN 1 tablet (250 mg) daily for 4 days. 6 tablet 1    diclofenac (VOLTAREN) 1 % topical gel Apply 4 g topically 3 times daily as needed for moderate pain (bursitis) 150 g 1    hydrochlorothiazide (HYDRODIURIL) 12.5 MG tablet Take 1 tablet (12.5 mg) by mouth daily 90 tablet 1    levothyroxine (SYNTHROID/LEVOTHROID) 25 MCG tablet Take 1 tablet (25 mcg) by mouth daily 90 tablet 1     metFORMIN (GLUCOPHAGE) 500 MG tablet Take 1 tablet (500 mg) by mouth 2 times daily (with meals) 180 tablet 1    metoprolol succinate ER (TOPROL XL) 100 MG 24 hr tablet 1 1/2 ( 150 mg ) po daily 135 tablet 1    multivitamin w/minerals (THERA-VIT-M) tablet Take 1 tablet by mouth daily      omeprazole (PRILOSEC) 20 MG DR capsule TAKE 1 CAPSULE BY MOUTH ONCE DAILY 30 TO 60 MINUTES BEFORE A MEAL 90 capsule 1    spironolactone (ALDACTONE) 25 MG tablet Take 1 tablet (25 mg) by mouth daily 90 tablet 1    tamsulosin (FLOMAX) 0.4 MG capsule Take 1 capsule (0.4 mg) by mouth daily      tiZANidine (ZANAFLEX) 4 MG tablet TAKE 1/2 (ONE-HALF) TO 1  TABLET BY MOUTH UP TO THREE TIMES DAILY AS NEEDED 30 tablet 0    traMADol (ULTRAM) 50 MG tablet TAKE 1 TABLET BY MOUTH EVERY 6 HOURS AS NEEDED FOR MODERATE TO SEVERE PAIN 30 tablet 0    valsartan (DIOVAN) 160 MG tablet Take 1 tablet (160 mg) by mouth daily 90 tablet 1    cholecalciferol 25 MCG (1000 UT) TABS Take 1,000 Units by mouth daily 2 tabs daily (Patient not taking: Reported on 3/5/2024)      polyethylene glycol (MIRALAX) 17 g packet Take 17 g by mouth daily as needed for constipation (Patient not taking: Reported on 3/5/2024) 30 packet 0    pregabalin (LYRICA) 75 MG capsule Take 1 capsule (75 mg) by mouth 3 times daily as needed (pain) (Patient not taking: Reported on 4/16/2024) 90 capsule 1    semaglutide (OZEMPIC, 0.25 OR 0.5 MG/DOSE,) 2 MG/3ML pen INJECT 0.5 MG SUBCUTANEOUSLY ONCE EVERY 7 DAYS (Patient not taking: Reported on 4/16/2024) 3 mL 3         ALLERGIES:  Allergies   Allergen Reactions    Bee Venom     Influenza Vaccines Other (See Comments)     delirium  fever           PAST MEDICAL HISTORY:  Past Medical History:   Diagnosis Date    Arthritis     Chronic, continuous use of opioids     Esophageal reflux 03/01/2014    Hearing problem     Hiatal hernia     Hypertension     Jaundice 05/31/2008    Liver disease     Obesity 01/24/2013    Obstructive sleep apnea     Sleep  apnea     Advised CPAP machine. Not keen to use it.     Syncope, unspecified syncope type 2/20/2023         PAST SURGICAL HISTORY:  Past Surgical History:   Procedure Laterality Date    ARTHROSCOPY KNEE RT/LT      (L) with partial medial meniscectomy    CATARACT IOL, RT/LT  Nov and Dec 2017    COLONOSCOPY N/A 4/24/2019    Procedure: COLONOSCOPY, WITH POLYPECTOMY AND BIOPSY;  Surgeon: Leventhal, Thomas Michael, MD;  [facility]: [facility]    COLONOSCOPY N/A 9/14/2022    Procedure: COLONOSCOPY;  Surgeon: Rafa Renee MD;  Location:  GI    DACRYOCYSTORHINOSTOMY Left 10/16/2018    Procedure: DACRYOCYSTORHINOSTOMY;  Surgeon: Madhu Krause MD;  Location: [facility]    ENDOSCOPIC ENDONASAL SURGERY  1994    ENDOSCOPY  2-19-15    ESOPHAGOSCOPY, GASTROSCOPY, DUODENOSCOPY (EGD), COMBINED N/A 4/24/2019    Procedure: COMBINED ESOPHAGOSCOPY, GASTROSCOPY, DUODENOSCOPY (EGD) - hold aspirin ibuprofen or naproxen for one week prior (per physician order);  Surgeon: Leventhal, Thomas Michael, MD;  Location: [facility] OR    ESOPHAGOSCOPY, GASTROSCOPY, DUODENOSCOPY (EGD), COMBINED N/A 5/23/2023    Procedure: Esophagoscopy, gastroscopy, duodenoscopy, combined;  Surgeon: Rafa Renee MD;  Location:  GI    EYE SURGERY      Hernia surgery Left 1994    NASAL/SINUS POLYPECTOMY      REPAIR PTOSIS Left 10/16/2018    Procedure: LEFT UPPER LID PTOSIS AND BILATERAL  BROW PTOSIS REPAIR WITH LEFT DACRYOCYSTORHINOSTOMY ;  Surgeon: Madhu Krause MD;  Location: [facility]    REPAIR PTOSIS BROW Bilateral 10/16/2018    Procedure: REPAIR PTOSIS BROW;  Surgeon: Madhu Krause MD;  Location: [facility]    ROTATOR CUFF REPAIR RT/LT Right     ZZC OPEN RX ANKLE DISLOCATN+FIXATN      (R)    ZZC SPINAL FUSION,ANT,EA ADNL LEVEL      T12 - L1         SOCIAL HISTORY:  Social History     Socioeconomic History    Marital status: Single     Spouse name: Not on file    Number of children: 0    Years of education: 18    Highest education level: Not on file    Occupational History    Occupation: Contractor     Employer: SELF     Comment: Not working now   Tobacco Use    Smoking status: Former     Current packs/day: 0.00     Types: Cigarettes     Start date: 1990     Quit date: 1999     Years since quittin.3     Passive exposure: Never    Smokeless tobacco: Never   Vaping Use    Vaping status: Never Used   Substance and Sexual Activity    Alcohol use: Yes     Comment: rare    Drug use: No    Sexual activity: Not Currently     Partners: Female   Other Topics Concern    Parent/sibling w/ CABG, MI or angioplasty before 65F 55M? No   Social History Narrative    Not on file     Social Determinants of Health     Financial Resource Strain: Low Risk  (2023)    Financial Resource Strain     Within the past 12 months, have you or your family members you live with been unable to get utilities (heat, electricity) when it was really needed?: No   Food Insecurity: Low Risk  (2023)    Food Insecurity     Within the past 12 months, did you worry that your food would run out before you got money to buy more?: No     Within the past 12 months, did the food you bought just not last and you didn t have money to get more?: No   Transportation Needs: Low Risk  (2023)    Transportation Needs     Within the past 12 months, has lack of transportation kept you from medical appointments, getting your medicines, non-medical meetings or appointments, work, or from getting things that you need?: No   Physical Activity: Not on file   Stress: Not on file   Social Connections: Unknown (2023)    Received from [facility]    Social Connections     Frequency of Communication with Friends and Family: Not on file   Interpersonal Safety: Low Risk  (10/10/2023)    Interpersonal Safety     Do you feel physically and emotionally safe where you currently live?: Yes     Within the past 12 months, have  "you been hit, slapped, kicked or otherwise physically hurt by someone?: No     Within the past 12 months, have you been humiliated or emotionally abused in other ways by your partner or ex-partner?: No   Housing Stability: Low Risk  (12/20/2023)    Housing Stability     Do you have housing? : Yes     Are you worried about losing your housing?: No         FAMILY HISTORY:  Family History   Problem Relation Age of Onset    Alzheimer Disease Mother 85    Dementia Mother     Cerebrovascular Disease Father 50    Cancer Father     Hypertension Father     Neurologic Disorder No family hx of     Diabetes No family hx of          PHYSICAL EXAM:  Vital signs:  BP (!) 150/62   Temp 98.7  F (37.1  C) (Temporal)   Ht 1.803 m (5' 11\")   Wt 117.6 kg (259 lb 4.8 oz)   BMI 36.17 kg/m       GENERAL/CONSTITUTIONAL: No acute distress.  EYES: Pupils are equal and round. Extraocular movements intact without nystagmus.  No scleral icterus.  RESPIRATORY: Equal chest rise.   MUSCULOSKELETAL: Warm and well-perfused, no cyanosis, clubbing, or edema.   NEUROLOGIC: Cranial nerves are grossly intact. Alert, oriented to person, place and time, answers questions appropriately.  INTEGUMENTARY: No rashes or jaundice.        LABS:  CBC RESULTS:   Recent Labs   Lab Test 04/09/24  0913   WBC 3.4*   RBC 3.67*   HGB 11.8*   HCT 36.7*      MCH 32.2   MCHC 32.2   RDW 13.7   PLT 48*       Recent Labs   Lab Test 04/09/24  0913 02/20/24  1138   * 143   POTASSIUM 4.7 4.4   CHLORIDE 110* 111*   CO2 27 22   ANIONGAP 9 10   * 160*   BUN 28.0* 24.5*   CR 1.31* 1.24*   SOLEDAD 9.3 8.7*         PATHOLOGY:      IMAGING:      ASSESSMENT/PLAN:  Gucci Logan is a 73 year old male with:    #Pancytopenia  #Leukopenia with lymphopenia  #Borderline macrocytic anemia  #Chronic thrombocytopenia  -Regarding leukopenia with lymphopenia: 7/2008-2/2020 WBC normal, 4/2020-6/2023 mild intermittent leukopenia with WBC ranging from 3.1-7.4, 8/2023-4/2024 WBC " 3.0-3.8; Differential with mild lymphopenia, ALC 0.6-0.7  - Regarding borderline macrocytic anemia: 7/2008-7/2021 hemoglobin 13-15, 11/2021-12/2023 hemoglobin 12-13 with MCV 90s, 2/2024 hemoglobin 10.9, 4/2024 hemoglobin 11.8, MCV 90s-103  - Regarding chronic thrombocytopenia: 7/2008-9/2020 platelet 60K-100K, 1/2001-4/2024 platelet 45 K-70 K  - 4/24 labs:  WBC 3.4, ALC 0.7, hemoglobin 11.8, , platelet 48 K  Creatinine 1.31, GFR 58, calcium 9.3, total protein 7.3, albumin 4.0, total bilirubin 1.3  AST 47, ALT 47, alk phos 153  TSH 4.94  -8/2023 labs:  Ferritin 214, iron saturation index 30, TIBC 319, iron 97  B12 486  - 1/23 SIFE no monoclonal protein  -8/2022 labs:  Rheumatoid factor 9, CRISS negative  - 4/2020: HIV antigen antibody combination negative  - 3/2014 hep C RNA quantification <12IU/mL    - 4/23 abdominal ultrasound: Splenomegaly 18.6 cm  - 5/23 EGD: Mild portal hypertensive gastropathy and gastric cardia, gastric fundus and gastric body  - 9/22 colonoscopy: Diverticulosis of sigmoid colon and descending colon, follow-up 5 years    PLAN:  -The above is in the setting of longstanding liver cirrhosis since 2008, splenomegaly , CKD stage III  -Check the following labs:  Ferritin, iron studies  B12, RBC folate, copper, zinc  TSH  CMP, retic, LDH, direct bilirubin  Peripheral smear  UA blood and RBC  celiac screen with tissue transglutaminase IgG and IgA  hepatitis B, hepatitis C with RNA quantification, HIV  coagulation studies  ESR, CRP, rheumatoid factor, CRISS, anti-CCP antibody  Peripheral flow cytometry for LGL  RBC antigen for Koo null associated neutropenia/benign ethnic neutropenia   SPEP, SIFE, 24 hour urine UPEP and UIFE, kappa and lambda light chain ratio, quantitative serum immunoglobulins  - if reversible etiology is not found, will consider bone marrow biopsy    #Liver cirrhosis  #History of hepatitis C infection  #Nonalcoholic fatty liver disease  #Splenomegaly 18.6 cm 4/23     RTC 4-6  weeks for follow up with briana Fernandez DO  Hematology/Oncology  [facility]    Future Appointments   Date Time Provider Department Center   4/16/2024 12:45 PM Cheri Fernandez DO [facility]   4/30/2024 10:00 AM [facility]i Jaramillo MD [facility]   4/30/2024  2:10 PM Gm Coe MD [facility]   10/14/2024  1:30 PM Mata Renteria MD [facility]

## 2024-04-17 ENCOUNTER — PATIENT OUTREACH (OUTPATIENT)
Dept: ONCOLOGY | Facility: CLINIC | Age: 74
End: 2024-04-17
Payer: COMMERCIAL

## 2024-04-17 LAB
ALBUMIN SERPL ELPH-MCNC: 3.5 G/DL (ref 3.7–5.1)
ALPHA1 GLOB SERPL ELPH-MCNC: 0.3 G/DL (ref 0.2–0.4)
ALPHA2 GLOB SERPL ELPH-MCNC: 0.6 G/DL (ref 0.5–0.9)
ANA SER QL IF: NEGATIVE
B-GLOBULIN SERPL ELPH-MCNC: 0.9 G/DL (ref 0.6–1)
GAMMA GLOB SERPL ELPH-MCNC: 0.9 G/DL (ref 0.7–1.6)
HBV CORE AB SERPL QL IA: NONREACTIVE
HBV SURFACE AB SERPL IA-ACNC: 6.85 M[IU]/ML
HBV SURFACE AB SERPL IA-ACNC: NONREACTIVE M[IU]/ML
HBV SURFACE AG SERPL QL IA: NONREACTIVE
HCV AB SERPL QL IA: REACTIVE
HIV 1+2 AB+HIV1 P24 AG SERPL QL IA: NONREACTIVE
IGA SERPL-MCNC: 494 MG/DL (ref 84–499)
IGG SERPL-MCNC: 920 MG/DL (ref 610–1616)
IGM SERPL-MCNC: 26 MG/DL (ref 35–242)
KAPPA LC FREE SER-MCNC: 3.74 MG/DL (ref 0.33–1.94)
KAPPA LC FREE/LAMBDA FREE SER NEPH: 1.08 {RATIO} (ref 0.26–1.65)
LAMBDA LC FREE SERPL-MCNC: 3.46 MG/DL (ref 0.57–2.63)
M PROTEIN SERPL ELPH-MCNC: 0 G/DL
PATH REPORT.COMMENTS IMP SPEC: ABNORMAL
PATH REPORT.COMMENTS IMP SPEC: NORMAL
PATH REPORT.FINAL DX SPEC: NORMAL
PATH REPORT.MICROSCOPIC SPEC OTHER STN: NORMAL
PATH REPORT.MICROSCOPIC SPEC OTHER STN: NORMAL
PROT PATTERN SERPL ELPH-IMP: ABNORMAL
PROT PATTERN SERPL IFE-IMP: NORMAL
RHEUMATOID FACT SERPL-ACNC: <10 IU/ML
TOTAL PROTEIN SERUM FOR ELP: 6.3 G/DL (ref 6.4–8.3)
VIT B12 SERPL-MCNC: 591 PG/ML (ref 232–1245)

## 2024-04-17 NOTE — TELEPHONE ENCOUNTER
[facility]: Cancer Care Initial Note                                    Discussion with Patient:                                                      Spoke with patient to follow-up after visit with Dr. Fernandez 4/17/24 and enroll in [employer]. Patient asked appropriate questions regarding possibibity of procedures or treatments. This RN briefly reviewed timing of labs, pending results a BMBX and what this intails and future treatments. Patient states if he needs chemotherapy he does not think he would be interested. This RN explained this would all be a discussion with Dr. Fernandez after she reviews lab results and if these things are needed.     Assessment:                                                      Initial  Current living arrangement:: I live alone  Informal Support system:: Neighbors;Friends  Equipment Currently Used at Home: none  Bed or wheelchair confined:: No  Mobility Status: Independent  Transportation means:: Accessible car  Medication adherence problem (GOAL):: No  Knowledgeable about how to use meds:: Yes  Medication side effects suspected:: No  Referrals Placed: None    No assessment indicated    Intervention/Education provided during outreach:                                                       Will continue to follow-up patient for new steps after visit with Dr. Fernandez.    [facility]    Signature:  Rosita Aguiar RN

## 2024-04-18 LAB
CCP AB SER IA-ACNC: 1.6 U/ML
HCV RNA SERPL NAA+PROBE-ACNC: NOT DETECTED IU/ML
TTG IGA SER-ACNC: 1.5 U/ML
TTG IGG SER-ACNC: <0.6 U/ML

## 2024-04-19 LAB
COPPER SERPL-MCNC: 119.7 UG/DL
FOLATE RBC-MCNC: 1047 NG/ML
PATH REPORT.COMMENTS IMP SPEC: ABNORMAL
PATH REPORT.COMMENTS IMP SPEC: YES
PATH REPORT.FINAL DX SPEC: ABNORMAL
PATH REPORT.MICROSCOPIC SPEC OTHER STN: ABNORMAL
PATH REPORT.RELEVANT HX SPEC: ABNORMAL
ZINC SERPL-MCNC: 56.2 UG/DL

## 2024-04-22 ENCOUNTER — TELEPHONE (OUTPATIENT)
Dept: ONCOLOGY | Facility: CLINIC | Age: 74
End: 2024-04-22
Payer: COMMERCIAL

## 2024-04-22 NOTE — TELEPHONE ENCOUNTER
"Reason for Call:  Other appointment    Detailed comments: Patient calling to talk to Dr. Fernandez's nurse. When asked what it was regarding, he said \"I'll just talk to them when they call me back\" they then were asked if it was regarding symptoms or medications and he said, \"I think medication but I'll just tell them when they call me back\"      Phone Number Patient can be reached at: Home number on file [phone] (home)    Best Time: any    Can we leave a detailed message on this number? YES    Call taken on 4/22/2024 at 9:00 AM by Arina Vasquez    "

## 2024-04-22 NOTE — LETTER
[facility] [address] [address]  [phone]        May 8, 2024    Gucci Logan  [address] [address] [place] [id number]          Dear Gucci,    We have tried to contact you by phone several times. Please call our office at your earliest convenience and we can answer any questions that you may have.     Sincerely,        [facility]

## 2024-04-22 NOTE — TELEPHONE ENCOUNTER
Canelo is calling because he wants to be reached out to today and is concerned he hasn't been called already.  He states he has a very simple question that won't take much time.  He stated another doctor prescribed a pain medication and he wants to know if it's safe for him to take with everything that's going on, or if Dr. Fernandez would like to prescribe something else.    Crystal Dangelo  Complex

## 2024-04-23 PROCEDURE — 81050 URINALYSIS VOLUME MEASURE: CPT | Performed by: PATHOLOGY

## 2024-04-23 PROCEDURE — 84166 PROTEIN E-PHORESIS/URINE/CSF: CPT | Performed by: PATHOLOGY

## 2024-04-23 PROCEDURE — 86335 IMMUNFIX E-PHORSIS/URINE/CSF: CPT | Performed by: PATHOLOGY

## 2024-04-23 NOTE — TELEPHONE ENCOUNTER
[facility]/Voicemail    Clinical Data: Care Coordinator Outreach    Outreach attempted x 2.  Left message on patient's voicemail with call back information and requested return call.    Plan: Care Coordinator will try to reach patient again in 1-2 business days.    Rosita Aguiar RNCC

## 2024-04-23 NOTE — TELEPHONE ENCOUNTER
[facility]/Voicemail    Clinical Data: Care Coordinator Outreach    Outreach attempted x 1.  Left message on patient's voicemail with call back information and requested return call.    Plan: Care Coordinator will try to reach patient again in 1-2 business days.    Rosita Aguiar RNCC

## 2024-04-24 LAB
ALBUMIN MFR UR ELPH: 74.4 %
ALPHA1 GLOB MFR UR ELPH: 3.6 %
ALPHA2 GLOB MFR UR ELPH: 3.2 %
B-GLOBULIN MFR UR ELPH: 10.7 %
GAMMA GLOB MFR UR ELPH: 8.1 %
M PROTEIN MFR UR ELPH: 0 %
PATH REPORT.COMMENTS IMP SPEC: ABNORMAL
PROT PATTERN UR ELPH-IMP: ABNORMAL

## 2024-04-25 LAB
PATH REPORT.COMMENTS IMP SPEC: NORMAL
PROT ELPH PNL UR ELPH: NORMAL

## 2024-04-29 NOTE — TELEPHONE ENCOUNTER
[facility]/Voicemail    Clinical Data: Care Coordinator Outreach    Outreach attempted x 3.  Left message on patient's voicemail with call back information and requested return call.    Plan: Care Coordinator will do no further outreaches at this time. Multiple message have been left for patient regarding questions without call back.    Rosita Aguiar RNCC

## 2024-04-30 ENCOUNTER — OFFICE VISIT (OUTPATIENT)
Dept: FAMILY MEDICINE | Facility: CLINIC | Age: 74
End: 2024-04-30
Payer: COMMERCIAL

## 2024-04-30 ENCOUNTER — OFFICE VISIT (OUTPATIENT)
Dept: OPHTHALMOLOGY | Facility: CLINIC | Age: 74
End: 2024-04-30
Payer: COMMERCIAL

## 2024-04-30 VITALS
BODY MASS INDEX: 35.49 KG/M2 | WEIGHT: 253.5 LBS | DIASTOLIC BLOOD PRESSURE: 65 MMHG | HEIGHT: 71 IN | TEMPERATURE: 97.6 F | HEART RATE: 64 BPM | SYSTOLIC BLOOD PRESSURE: 147 MMHG | OXYGEN SATURATION: 97 % | RESPIRATION RATE: 18 BRPM

## 2024-04-30 DIAGNOSIS — D61.818 PANCYTOPENIA (H): Primary | ICD-10-CM

## 2024-04-30 DIAGNOSIS — E66.9 OBESITY, UNSPECIFIED OBESITY SEVERITY, UNSPECIFIED OBESITY TYPE: ICD-10-CM

## 2024-04-30 DIAGNOSIS — I10 HYPERTENSION GOAL BP (BLOOD PRESSURE) < 140/90: ICD-10-CM

## 2024-04-30 DIAGNOSIS — E11.42 TYPE 2 DIABETES MELLITUS WITH DIABETIC POLYNEUROPATHY, WITHOUT LONG-TERM CURRENT USE OF INSULIN (H): ICD-10-CM

## 2024-04-30 DIAGNOSIS — H40.013 OPEN ANGLE WITH BORDERLINE FINDINGS OF BOTH EYES: ICD-10-CM

## 2024-04-30 DIAGNOSIS — H40.003 GLAUCOMA SUSPECT OF BOTH EYES: Primary | ICD-10-CM

## 2024-04-30 DIAGNOSIS — S22.000A COMPRESSION FRACTURE OF THORACIC VERTEBRA, UNSPECIFIED THORACIC VERTEBRAL LEVEL, INITIAL ENCOUNTER (H): ICD-10-CM

## 2024-04-30 DIAGNOSIS — M79.605 LEFT LEG PAIN: ICD-10-CM

## 2024-04-30 PROCEDURE — 92083 EXTENDED VISUAL FIELD XM: CPT | Performed by: OPHTHALMOLOGY

## 2024-04-30 PROCEDURE — 99214 OFFICE O/P EST MOD 30 MIN: CPT | Performed by: FAMILY MEDICINE

## 2024-04-30 PROCEDURE — 92133 CPTRZD OPH DX IMG PST SGM ON: CPT | Performed by: OPHTHALMOLOGY

## 2024-04-30 PROCEDURE — 92002 INTRM OPH EXAM NEW PATIENT: CPT | Performed by: OPHTHALMOLOGY

## 2024-04-30 RX ORDER — TRAMADOL HYDROCHLORIDE 50 MG/1
50 TABLET ORAL EVERY 6 HOURS PRN
Qty: 30 TABLET | Refills: 0 | Status: ON HOLD | OUTPATIENT
Start: 2024-04-30 | End: 2024-05-21

## 2024-04-30 ASSESSMENT — TONOMETRY
OD_IOP_MMHG: 12
OS_IOP_MMHG: 13
IOP_METHOD: APPLANATION

## 2024-04-30 ASSESSMENT — VISUAL ACUITY
OS_PH_SC+: -2
METHOD: SNELLEN - LINEAR
OS_SC: 20/150
OD_SC+: +2
OD_PH_SC: 20/25
OS_PH_SC: 20/30
OD_SC: 20/40

## 2024-04-30 ASSESSMENT — SLIT LAMP EXAM - LIDS: COMMENTS: GOOD CREASE AND COSMESIS, 1+ MEIBOMIAN GLAND DYSFUNCTION

## 2024-04-30 ASSESSMENT — PACHYMETRY
OD_CT(UM): 612
OS_CT(UM): 618

## 2024-04-30 ASSESSMENT — EXTERNAL EXAM - RIGHT EYE: OD_EXAM: 2+ BROW PTOSIS

## 2024-04-30 ASSESSMENT — PAIN SCALES - GENERAL: PAINLEVEL: MODERATE PAIN (5)

## 2024-04-30 NOTE — PATIENT INSTRUCTIONS
Continue observation with regard to glaucoma suspect status.     Call in January 2025 for an appointment in May 2025 for Complete Exam    Dr. Coe [phone]

## 2024-04-30 NOTE — PROGRESS NOTES
"Lakhwinder Lemos is a 73 year old, presenting for the following health issues:  Patient Request (Follow up on leg pain)        4/30/2024    10:01 AM   Additional Questions   Roomed by Sofia Conde     History of Present Illness       Reason for visit:  Recheck    He eats 0-1 servings of fruits and vegetables daily.He consumes 0 sweetened beverage(s) daily.He exercises with enough effort to increase his heart rate 9 or less minutes per day.  He exercises with enough effort to increase his heart rate 3 or less days per week.   He is taking medications regularly.       Ribs better    Left hip/ leg not better    Went to [facility] for this    Tried old Norco for the pain    Tizanidine helps put him to sleep      Went to hematologist also    Tizanidine at night          Objective    BP (!) 189/80 (BP Location: Right arm, Patient Position: Chair, Cuff Size: Adult Regular)   Pulse 64   Temp 97.6  F (36.4  C) (Temporal)   Resp 18   Ht 1.803 m (5' 11\")   Wt 115 kg (253 lb 8 oz)   SpO2 97%   BMI 35.36 kg/m    Body mass index is 35.36 kg/m .  Physical Exam  Constitutional:       Appearance: He is well-developed.   HENT:      Head: Normocephalic and atraumatic.   Eyes:      Conjunctiva/sclera: Conjunctivae normal.   Neck:      Vascular: No carotid bruit.   Cardiovascular:      Rate and Rhythm: Normal rate and regular rhythm.      Heart sounds: Normal heart sounds.   Pulmonary:      Effort: Pulmonary effort is normal. No respiratory distress.      Breath sounds: Normal breath sounds.   Abdominal:      Palpations: Abdomen is soft.      Tenderness: There is no abdominal tenderness.   Neurological:      Mental Status: He is alert and oriented to person, place, and time.      Cranial Nerves: No cranial nerve deficit.   Psychiatric:         Speech: Speech normal.         Behavior: Behavior normal.         No edema    Radials symmtric    He points to left lateral hip/ trochanter when asked where pain is    Went over detailed " lab results         ASSESSMENT / PLAN:  (D61.818) Pancytopenia (H)  (primary encounter diagnosis)  Comment: appreciate hematology consult.    Plan: follow up with them as planned.  The bone marrow biopsy would provide good information.     (M79.605) Left leg pain  Comment: has seen phys therapy, orthopedics etc   Plan: continue to try to work on strength.  Advised against true narcotics     (S22.000A) Compression fracture of thoracic vertebra, unspecified thoracic vertebral level, initial encounter (H)  Comment:  did refill this; use sparingly   Plan: traMADol (ULTRAM) 50 MG tablet             (I10) Hypertension goal BP (blood pressure) < 140/90  Comment: better on recheck but still mildly elevated   Plan: monitor     (E66.9) Obesity, unspecified obesity severity, unspecified obesity type  Comment: chronic   Plan: keep working on healthy diet/exercise and wt loss     (E11.42) Type 2 diabetes mellitus with diabetic polyneuropathy, without long-term current use of insulin (H)  Comment: last hemoglobin a1c fine   Plan: no change in meds       I reviewed the patient's medications, allergies, medical history, family history, and social history.    Richi Jaramillo MD          Signed Electronically by: Richi Jaramillo MD

## 2024-04-30 NOTE — LETTER
4/30/2024         RE: Gucci Logan  [address] [facility] [address]        Dear Colleague,    Thank you for referring your patient, Gucci Logan, to the [facility]. Please see a copy of my visit note below.     Current Eye Medications: None     Subjective: Here for glaucoma evaluation with testing. Patient complains of left eye watering often. Noticed while watching TV and reading. Overall vision is okay at distance and near. No eye pain.      Objective:  See Ophthalmology Exam.       Assessment:  Normal intraocular pressure both eyes and mostly normal glaucoma OCT and Head Visual Field both eyes in patient who is a glaucoma suspect.  Thick corneas on pachy.      Plan:  Continue observation with regard to glaucoma suspect status.     Call in January 2025 for an appointment in May 2025 for Complete Exam    Dr. Coe [phone]         Again, thank you for allowing me to participate in the care of your patient.        Sincerely,        Gm Coe MD

## 2024-04-30 NOTE — PATIENT INSTRUCTIONS
Use tramadol sparingly    Schedule follow up with hematology    Bone marrow biopsy would be helpful to clarify why your 3 types of blood counts are low

## 2024-04-30 NOTE — PROGRESS NOTES
Current Eye Medications: None     Subjective: Here for glaucoma evaluation with testing. Patient complains of left eye watering often. Noticed while watching TV and reading. Overall vision is okay at distance and near. No eye pain.      Objective:  See Ophthalmology Exam.       Assessment:  Normal intraocular pressure both eyes and mostly normal glaucoma OCT and Head Visual Field both eyes in patient who is a glaucoma suspect.  Thick corneas on pachy.      Plan:  Continue observation with regard to glaucoma suspect status.     Call in January 2025 for an appointment in May 2025 for Complete Exam    Dr. Coe [phone]

## 2024-05-06 NOTE — TELEPHONE ENCOUNTER
Reason for Call:  Bone Marrow Test, lab results & no call back in timely manner    Detailed comments: Canelo stopped in and was stating that he called on 4/22 asking for a call back that day, regarding a UA he had the next morning and if he should take his pain medication.  He got repeated call backs the next day after the lab and days following.  He was upset about that and they were all after the fact.  He also has not received notification of test results, however at his appointment with Dr. Jaramillo on 4/30, Dr Jaramillo went over the results then for him.  Dr. Jaramillo also saw that Dr Fernandez put in orders for Bone Marrow test, but this has never been mentioned to him before.  He is concerned why he is not getting results or tests not being scheduled.    Phone Number Patient can be reached at: Cell number on file:    Telephone Information:   Mobile [phone]       Best Time: anytime    Can we leave a detailed message on this number? YES, he wants to make sure detailed messages are left, so phone tag is not happening.    Call taken on 5/6/2024 at 12:21 PM by Crystal Dangelo

## 2024-05-08 NOTE — TELEPHONE ENCOUNTER
Multiple attempts to reach patient made. Letter sent.     Margarita Lorenzo RN on 5/8/2024 at 2:54 PM

## 2024-05-09 ENCOUNTER — TELEPHONE (OUTPATIENT)
Dept: FAMILY MEDICINE | Facility: CLINIC | Age: 74
End: 2024-05-09
Payer: COMMERCIAL

## 2024-05-09 NOTE — TELEPHONE ENCOUNTER
Reason for Call:  Other call back    Detailed comments: [facility] recommending shot in hip joint to relieve pain/ arthritic pain? Soon. 2. Dr Fernandez (heomatologist) no communication from her until patient sees the doctor on 05/17/2024, is there another hematologist to see or better communication with my health.    Phone Number Patient can be reached at: Cell number on file:    Telephone Information:   Mobile [phone]       Best Time: anytime    Can we leave a detailed message on this number? YES    Call taken on 5/9/2024 at 11:46 AM by Tahmina Frederick

## 2024-05-13 NOTE — TELEPHONE ENCOUNTER
Advise just keeping the appointment with hematology as is  Please inform patient  Richi Jaramillo MD

## 2024-05-17 ENCOUNTER — ONCOLOGY VISIT (OUTPATIENT)
Dept: ONCOLOGY | Facility: CLINIC | Age: 74
End: 2024-05-17
Attending: INTERNAL MEDICINE
Payer: COMMERCIAL

## 2024-05-17 VITALS
DIASTOLIC BLOOD PRESSURE: 72 MMHG | RESPIRATION RATE: 16 BRPM | HEIGHT: 71 IN | SYSTOLIC BLOOD PRESSURE: 185 MMHG | OXYGEN SATURATION: 96 % | WEIGHT: 247 LBS | BODY MASS INDEX: 34.58 KG/M2 | HEART RATE: 61 BPM

## 2024-05-17 DIAGNOSIS — D72.819 LEUKOPENIA, UNSPECIFIED TYPE: Primary | ICD-10-CM

## 2024-05-17 PROCEDURE — G0463 HOSPITAL OUTPT CLINIC VISIT: HCPCS

## 2024-05-17 ASSESSMENT — PAIN SCALES - GENERAL: PAINLEVEL: SEVERE PAIN (6)

## 2024-05-17 NOTE — PROGRESS NOTES
"[facility]    Hematology/Oncology Established Patient Follow Up Note      Today's Date: 5/17/2024     Reason for follow up: anemia       I received notice from our [place] [facility] Manager that the patient had displayed angry behavior by raising his voice and use of inappropriate language with our [place] staff. We decided it would be helpful to discuss a behavior agreement before our next appointment. I also requested to have security on site at the time of the pts appointment.    Though I initially saw the patient in [place] for consultation, my next visit with him was in [place] due to appointment availability, thus our [place] and [place] teams worked together to arrange the above.     On the day of the appointment, the following occurred:   - I entered the room with our  Medical Oncology Clinic Manager Rosalie and explained her role, the reason for her to be present with me to discuss the purpose of a behavior agreement prior to proceeding with our visit today to ensure the sanctity of the patient-physician relationship.   - pt responded by saying \"oh I know why you are here\" and stated he was being poked and prodded by our oncology team by not responding his requests for call back on the same day and instead calling him back the next day (please see prior documentation regarding this). He stated he was angry and he should be allowed to use explicit words to express himself if he wanted to.  - Rosalie attempted to explain her role, the goal for this conversation, began reviewing the agreement at which point patient stood up and stated he was leaving. He opened the clinic room door and began speaking in a loud voice. At which point our MAs were now in direct view of our interactions as they were seated across from our clinic room. He stated he believed my clinic manager had a psychiatric illness because he thought she was smiling. He stated he was happy with my " care, appreciated our interactions and the detailed workup I had done so far to help look into why his blood counts were low. He stated he wished to see me as a provider but did not want to have anything to do with my staff or clinic and for that reason could not see me anymore.   - I did apologize that he did not feel heard multiple times and explained that was not our intention at all. I stated multiple times that he it is ok if he does not want to see me but I am worried about him and he needs to follow up with a hematologist because he needs continued evaluation for his cytopenias including a bone marrow biopsy.   - I offered to reach out to our leadership to help coordinate his future care.   - I offered to reach out to pts primary care physician regarding this as well  and review the workup I had done so far as well as my recommendations for future work up (which I did), to expedite having him re-establish with a different hematologist as well.   - I offered the patient a print out of his work up so far including my interpretation of them and my recommendations- I did give him a print out and he did initially accept it but then returned it back to me and said he didn't want to look at it and didn't want anything to do with [place].   - Rosalie and I both apologized on multiple occasions, explained that we want to have the opportunity to be a part of the patient's care but that we need to have some mutual agreements so that we can all feel safe and heard to provide the best care possible without causing delays to his care.   - Pt continued to speak loudly, appeared angry, was swinging his cane around throughout our visit. He exited the room into the main hallway and continued the above behavior, which was now becoming disruptive to the care of other patients.   - At this time, I apologized one final time and told him I would be unable to continue our discussion due to his current behavior and that it would be  best for our meeting to end at this time.     The summary of my assessment, work up and recommendations is detailed below- these are the same that I printed and attempted to give to the patient. I was not able to discuss this in detail with the patient due to the above. At this time, I will discuss the above with our [facility] Clinic Manager as well as our [facility] Oncology Clinic Manager to determine next best steps to proceed. I will not bill for this visit.    Cheri Fernandez DO  Hematology/Oncology  [facility]      #Pancytopenia  #Leukopenia with lymphopenia  #Borderline macrocytic anemia  #Chronic thrombocytopenia  #low count monoclonal B cell lymphocytosis  -Regarding leukopenia with lymphopenia: 7/2008-2/2020 WBC normal, 4/2020-6/2023 mild intermittent leukopenia with WBC ranging from 3.1-7.4, 8/2023-4/2024 WBC 3.0-3.8; Differential with mild lymphopenia, ALC 0.6-0.7  - Regarding borderline macrocytic anemia: 7/2008-7/2021 hemoglobin 13-15, 11/2021-12/2023 hemoglobin 12-13 with MCV 90s, 2/2024 hemoglobin 10.9, 4/2024 hemoglobin 11.8, MCV 90s-103  - Regarding chronic thrombocytopenia: 7/2008-9/2020 platelet 60K-100K, 1/2001-4/2024 platelet 45 K-70 K  - 4/24 labs:  WBC 3.4, ALC 0.7, hemoglobin 11.8, , platelet 48 K  Creatinine 1.31, GFR 58, calcium 9.3, total protein 7.3, albumin 4.0, total bilirubin 1.3  AST 47, ALT 47, alk phos 153  TSH 4.94  -8/2023 labs:  Ferritin 214, iron saturation index 30, TIBC 319, iron 97  B12 486  - 1/23 SIFE no monoclonal protein  -8/2022 labs:  Rheumatoid factor 9, CRISS negative  - 4/2020: HIV antigen antibody combination negative  - 3/2014 hep C RNA quantification <12IU/mL    - 4/2024 labs:  WBC 3.4, ALC 0.5, hgb 10.9, hematocrit 33.1, MCV 99, plt 52K  Ferritin 167, iron sat 21, TIBC 290, iron 61  B12 591, RBC folate 1047, copper normal, zinc 56 (low)  TSH 5.38 free T4 1.13  CMP: Cr 1.32, GFR 57, total protein 7.1, albumin 3.6,  calcium 8.9, AST 52, ALT 48, alk phos 167,  absolute retic 0.047, , Total bili 1.2, Direct bili 0.33  Peripheral smear  UA moderate blood and 4 RBC  tissue transglutaminase IgG and IgA negative  hepatitis B negative, Hep C Ab positive, RNA negative, HIV negative  coagulation studies  CRP 7.88, ESR 25, rheumatoid factor negative, CRISS negative, anti-CCP antibody negative  Peripheral flow cytometry: CD5-positive lambda-monotypic B cells (0.3%) express CD5, CD19, CD23, CD49d, and monotypic cytoplasmic lambda immunoglobulin light chains but lack CD10, CD20, CD38, CD79b, and surface immunoglobulin light chains. A monotypic B cell population with the immunophenotype of chronic lymphocytic leukemia/small lymphocytic lymphoma (CLL/SLL) is present. Based on the reported WBC of 3.4 x10^9/L, the findings are consistent with low-count monoclonal B cell lymphocytosis (MBL)   RBC antigen: Fya Ag negative, Fyb Ag positive  1.  SIFE: no monoclonal protein  2.  SPEP: no monoclonal protein  3.  UIFE: no monoclonal protein  4.  UPEP: no monoclonal protein  5.  Kappa/lambda ratio: kappa 3.74, lambda 3.46, k/l ratio 1.09  6.  Quantitative immunoglobulins: Ig    IgA: 494   IgM: 26        -  abdominal ultrasound: Splenomegaly 18.6 cm  -  EGD: Mild portal hypertensive gastropathy and gastric cardia, gastric fundus and gastric body  -  colonoscopy: Diverticulosis of sigmoid colon and descending colon, follow-up 5 years    PLAN:  - The above is in the setting of longstanding liver cirrhosis since , splenomegaly , CKD stage III  - workup shows microscopic hematuria, low count MBL, mild zinc deficiency; which together do not explain the severity of pts pancytopenia   - I recommend doing a bone marrow biopsy   - Check the following labs:  Alk phos isoenzymes  UA with cytology  - start MVI containing zinc

## 2024-05-17 NOTE — LETTER
"    5/17/2024         RE: Gucci Logan  [address] [facility] [address]        Dear Colleague,    Thank you for referring your patient, Gucci Logan, to the [facility]. Please see a copy of my visit note below.    [facility]    Hematology/Oncology Established Patient Follow Up Note      Today's Date: 5/17/2024     Reason for follow up: anemia       I received notice from our [place] [facility] Manager that the patient had displayed angry behavior by raising his voice and use of inappropriate language with our [place] staff. We decided it would be helpful to discuss a behavior agreement before our next appointment. I also requested to have security on site at the time of the pts appointment.    Though I initially saw the patient in [place] for consultation, my next visit with him was in [place] due to appointment availability, thus our [place] and [place] teams worked together to arrange the above.     On the day of the appointment, the following occurred:   - I entered the room with our  Medical Oncology Clinic Manager Rosalie and explained her role, the reason for her to be present with me to discuss the purpose of a behavior agreement prior to proceeding with our visit today to ensure the sanctity of the patient-physician relationship.   - pt responded by saying \"oh I know why you are here\" and stated he was being poked and prodded by our oncology team by not responding his requests for call back on the same day and instead calling him back the next day (please see prior documentation regarding this). He stated he was angry and he should be allowed to use explicit words to express himself if he wanted to.  - Rosalie attempted to explain her role, the goal for this conversation, began reviewing the agreement at which point patient stood up and stated he was leaving. He opened the clinic room door and began speaking " in a loud voice. At which point our MAs were now in direct view of our interactions as they were seated across from our clinic room. He stated he believed my clinic manager had a psychiatric illness because he thought she was smiling. He stated he was happy with my care, appreciated our interactions and the detailed workup I had done so far to help look into why his blood counts were low. He stated he wished to see me as a provider but did not want to have anything to do with my staff or clinic and for that reason could not see me anymore.   - I did apologize that he did not feel heard multiple times and explained that was not our intention at all. I stated multiple times that he it is ok if he does not want to see me but I am worried about him and he needs to follow up with a hematologist because he needs continued evaluation for his cytopenias including a bone marrow biopsy.   - I offered to reach out to our leadership to help coordinate his future care.   - I offered to reach out to pts primary care physician regarding this as well  and review the workup I had done so far as well as my recommendations for future work up (which I did), to expedite having him re-establish with a different hematologist as well.   - I offered the patient a print out of his work up so far including my interpretation of them and my recommendations- I did give him a print out and he did initially accept it but then returned it back to me and said he didn't want to look at it and didn't want anything to do with [place].   - Rosalie and I both apologized on multiple occasions, explained that we want to have the opportunity to be a part of the patient's care but that we need to have some mutual agreements so that we can all feel safe and heard to provide the best care possible without causing delays to his care.   - Pt continued to speak loudly, appeared angry, was swinging his cane around throughout our visit. He exited the room into the  ahmet coffman and continued the above behavior, which was now becoming disruptive to the care of other patients.   - At this time, I apologized one final time and told him I would be unable to continue our discussion due to his current behavior and that it would be best for our meeting to end at this time.     The summary of my assessment, work up and recommendations is detailed below- these are the same that I printed and attempted to give to the patient. I was not able to discuss this in detail with the patient due to the above. At this time, I will discuss the above with our [facility] Clinic Manager as well as our [facility] Clinic Manager to determine next best steps to proceed. I will not bill for this visit.    Cheri Fernandez DO  Hematology/Oncology  [facility]      #Pancytopenia  #Leukopenia with lymphopenia  #Borderline macrocytic anemia  #Chronic thrombocytopenia  #low count monoclonal B cell lymphocytosis  -Regarding leukopenia with lymphopenia: 7/2008-2/2020 WBC normal, 4/2020-6/2023 mild intermittent leukopenia with WBC ranging from 3.1-7.4, 8/2023-4/2024 WBC 3.0-3.8; Differential with mild lymphopenia, ALC 0.6-0.7  - Regarding borderline macrocytic anemia: 7/2008-7/2021 hemoglobin 13-15, 11/2021-12/2023 hemoglobin 12-13 with MCV 90s, 2/2024 hemoglobin 10.9, 4/2024 hemoglobin 11.8, MCV 90s-103  - Regarding chronic thrombocytopenia: 7/2008-9/2020 platelet 60K-100K, 1/2001-4/2024 platelet 45 K-70 K  - 4/24 labs:  WBC 3.4, ALC 0.7, hemoglobin 11.8, , platelet 48 K  Creatinine 1.31, GFR 58, calcium 9.3, total protein 7.3, albumin 4.0, total bilirubin 1.3  AST 47, ALT 47, alk phos 153  TSH 4.94  -8/2023 labs:  Ferritin 214, iron saturation index 30, TIBC 319, iron 97  B12 486  - 1/23 SIFE no monoclonal protein  -8/2022 labs:  Rheumatoid factor 9, CRISS negative  - 4/2020: HIV antigen antibody combination negative  - 3/2014 hep C RNA quantification  <12IU/mL    - 2024 labs:  WBC 3.4, ALC 0.5, hgb 10.9, hematocrit 33.1, MCV 99, plt 52K  Ferritin 167, iron sat 21, TIBC 290, iron 61  B12 591, RBC folate 1047, copper normal, zinc 56 (low)  TSH 5.38 free T4 1.13  CMP: Cr 1.32, GFR 57, total protein 7.1, albumin 3.6, calcium 8.9, AST 52, ALT 48, alk phos 167,  absolute retic 0.047, , Total bili 1.2, Direct bili 0.33  Peripheral smear  UA moderate blood and 4 RBC  tissue transglutaminase IgG and IgA negative  hepatitis B negative, Hep C Ab positive, RNA negative, HIV negative  coagulation studies  CRP 7.88, ESR 25, rheumatoid factor negative, CRISS negative, anti-CCP antibody negative  Peripheral flow cytometry: CD5-positive lambda-monotypic B cells (0.3%) express CD5, CD19, CD23, CD49d, and monotypic cytoplasmic lambda immunoglobulin light chains but lack CD10, CD20, CD38, CD79b, and surface immunoglobulin light chains. A monotypic B cell population with the immunophenotype of chronic lymphocytic leukemia/small lymphocytic lymphoma (CLL/SLL) is present. Based on the reported WBC of 3.4 x10^9/L, the findings are consistent with low-count monoclonal B cell lymphocytosis (MBL)   RBC antigen: Fya Ag negative, Fyb Ag positive  1.  SIFE: no monoclonal protein  2.  SPEP: no monoclonal protein  3.  UIFE: no monoclonal protein  4.  UPEP: no monoclonal protein  5.  Kappa/lambda ratio: kappa 3.74, lambda 3.46, k/l ratio 1.09  6.  Quantitative immunoglobulins: Ig    IgA: 494   IgM: 26        -  abdominal ultrasound: Splenomegaly 18.6 cm  -  EGD: Mild portal hypertensive gastropathy and gastric cardia, gastric fundus and gastric body  -  colonoscopy: Diverticulosis of sigmoid colon and descending colon, follow-up 5 years    PLAN:  - The above is in the setting of longstanding liver cirrhosis since , splenomegaly , CKD stage III  - workup shows microscopic hematuria, low count MBL, mild zinc deficiency; which together do not explain the severity of pts  pancytopenia   - I recommend doing a bone marrow biopsy   - Check the following labs:  Alk phos isoenzymes  UA with cytology  - start MVI containing zinc          Again, thank you for allowing me to participate in the care of your patient.        Sincerely,        ISA MAYER, DO

## 2024-05-17 NOTE — NURSING NOTE
"Oncology Rooming Note    May 17, 2024 9:47 AM   Gucci Logan is a 73 year old male who presents for:    Chief Complaint   Patient presents with    Oncology Clinic Visit     Follow up     Initial Vitals: BP (!) 185/72 (BP Location: Right arm)   Pulse 61   Resp 16   Ht 1.803 m (5' 10.98\")   Wt 112 kg (247 lb)   SpO2 96%   BMI 34.47 kg/m   Estimated body mass index is 34.47 kg/m  as calculated from the following:    Height as of this encounter: 1.803 m (5' 10.98\").    Weight as of this encounter: 112 kg (247 lb). Body surface area is 2.37 meters squared.  Severe Pain (6) Comment: Data Unavailable   No LMP for male patient.  Allergies reviewed: Yes  Medications reviewed: Yes    Medications: Medication refills not needed today.  Pharmacy name entered into EPIC: [id number] - [place], [place] - [address] [facility]    Frailty Screening:   Is the patient here for a new oncology consult visit in cancer care? 2. No      Clinical concerns: results. Tx plan       April NINI Nava              "

## 2024-05-19 ENCOUNTER — APPOINTMENT (OUTPATIENT)
Dept: GENERAL RADIOLOGY | Facility: CLINIC | Age: 74
DRG: 480 | End: 2024-05-19
Attending: EMERGENCY MEDICINE
Payer: COMMERCIAL

## 2024-05-19 ENCOUNTER — APPOINTMENT (OUTPATIENT)
Dept: CT IMAGING | Facility: CLINIC | Age: 74
DRG: 480 | End: 2024-05-19
Attending: EMERGENCY MEDICINE
Payer: COMMERCIAL

## 2024-05-19 ENCOUNTER — HOSPITAL ENCOUNTER (INPATIENT)
Facility: CLINIC | Age: 74
LOS: 2 days | Discharge: NURSING, REHAB OR LONG-TERM CARE | DRG: 480 | End: 2024-05-22
Attending: EMERGENCY MEDICINE | Admitting: INTERNAL MEDICINE
Payer: COMMERCIAL

## 2024-05-19 ENCOUNTER — APPOINTMENT (OUTPATIENT)
Dept: GENERAL RADIOLOGY | Facility: CLINIC | Age: 74
DRG: 480 | End: 2024-05-19
Attending: INTERNAL MEDICINE
Payer: COMMERCIAL

## 2024-05-19 DIAGNOSIS — Z98.890 STATUS POST HIP SURGERY: Primary | ICD-10-CM
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
## 2024-05-19 DIAGNOSIS — S22.000A COMPRESSION FRACTURE OF THORACIC VERTEBRA, UNSPECIFIED THORACIC VERTEBRAL LEVEL, INITIAL ENCOUNTER (H): ICD-10-CM

## 2024-05-19 DIAGNOSIS — S72.142A INTERTROCHANTERIC FRACTURE OF LEFT FEMUR, CLOSED, INITIAL ENCOUNTER (H): ICD-10-CM

## 2024-05-19 LAB
ANION GAP SERPL CALCULATED.3IONS-SCNC: 11 MMOL/L (ref 7–15)
BUN SERPL-MCNC: 27.7 MG/DL (ref 8–23)
CALCIUM SERPL-MCNC: 9 MG/DL (ref 8.8–10.2)
CHLORIDE SERPL-SCNC: 104 MMOL/L (ref 98–107)
CK SERPL-CCNC: 372 U/L (ref 39–308)
CREAT SERPL-MCNC: 1.37 MG/DL (ref 0.67–1.17)
DEPRECATED HCO3 PLAS-SCNC: 24 MMOL/L (ref 22–29)
EGFRCR SERPLBLD CKD-EPI 2021: 55 ML/MIN/1.73M2
GLUCOSE SERPL-MCNC: 122 MG/DL (ref 70–99)
POTASSIUM SERPL-SCNC: 4.2 MMOL/L (ref 3.4–5.3)
SODIUM SERPL-SCNC: 139 MMOL/L (ref 135–145)

## 2024-05-19 PROCEDURE — 96374 THER/PROPH/DIAG INJ IV PUSH: CPT | Performed by: EMERGENCY MEDICINE

## 2024-05-19 PROCEDURE — 250N000011 HC RX IP 250 OP 636: Performed by: EMERGENCY MEDICINE

## 2024-05-19 PROCEDURE — 70450 CT HEAD/BRAIN W/O DYE: CPT

## 2024-05-19 PROCEDURE — 258N000003 HC RX IP 258 OP 636: Performed by: EMERGENCY MEDICINE

## 2024-05-19 PROCEDURE — 99285 EMERGENCY DEPT VISIT HI MDM: CPT | Performed by: EMERGENCY MEDICINE

## 2024-05-19 PROCEDURE — 999N000157 HC STATISTIC RCP TIME EA 10 MIN

## 2024-05-19 PROCEDURE — 73502 X-RAY EXAM HIP UNI 2-3 VIEWS: CPT

## 2024-05-19 PROCEDURE — 250N000013 HC RX MED GY IP 250 OP 250 PS 637: Performed by: INTERNAL MEDICINE

## 2024-05-19 PROCEDURE — 82550 ASSAY OF CK (CPK): CPT | Performed by: EMERGENCY MEDICINE

## 2024-05-19 PROCEDURE — 72125 CT NECK SPINE W/O DYE: CPT

## 2024-05-19 PROCEDURE — 36415 COLL VENOUS BLD VENIPUNCTURE: CPT | Performed by: EMERGENCY MEDICINE

## 2024-05-19 PROCEDURE — G0378 HOSPITAL OBSERVATION PER HR: HCPCS

## 2024-05-19 PROCEDURE — 93005 ELECTROCARDIOGRAM TRACING: CPT

## 2024-05-19 PROCEDURE — 99285 EMERGENCY DEPT VISIT HI MDM: CPT | Mod: 25 | Performed by: EMERGENCY MEDICINE

## 2024-05-19 PROCEDURE — 80048 BASIC METABOLIC PNL TOTAL CA: CPT | Performed by: EMERGENCY MEDICINE

## 2024-05-19 PROCEDURE — 96375 TX/PRO/DX INJ NEW DRUG ADDON: CPT | Performed by: EMERGENCY MEDICINE

## 2024-05-19 PROCEDURE — 250N000013 HC RX MED GY IP 250 OP 250 PS 637: Performed by: EMERGENCY MEDICINE

## 2024-05-19 PROCEDURE — 96361 HYDRATE IV INFUSION ADD-ON: CPT | Performed by: EMERGENCY MEDICINE

## 2024-05-19 PROCEDURE — 71045 X-RAY EXAM CHEST 1 VIEW: CPT

## 2024-05-19 PROCEDURE — 250N000011 HC RX IP 250 OP 636: Performed by: INTERNAL MEDICINE

## 2024-05-19 PROCEDURE — 99223 1ST HOSP IP/OBS HIGH 75: CPT | Performed by: INTERNAL MEDICINE

## 2024-05-19 PROCEDURE — 96376 TX/PRO/DX INJ SAME DRUG ADON: CPT | Performed by: EMERGENCY MEDICINE

## 2024-05-19 PROCEDURE — 96376 TX/PRO/DX INJ SAME DRUG ADON: CPT

## 2024-05-19 RX ORDER — PANTOPRAZOLE SODIUM 40 MG/1
40 TABLET, DELAYED RELEASE ORAL DAILY
Status: DISCONTINUED | OUTPATIENT
Start: 2024-05-20 | End: 2024-05-22 | Stop reason: HOSPADM

## 2024-05-19 RX ORDER — NALOXONE HYDROCHLORIDE 0.4 MG/ML
0.4 INJECTION, SOLUTION INTRAMUSCULAR; INTRAVENOUS; SUBCUTANEOUS
Status: DISCONTINUED | OUTPATIENT
Start: 2024-05-19 | End: 2024-05-22 | Stop reason: HOSPADM

## 2024-05-19 RX ORDER — VALSARTAN 80 MG/1
160 TABLET ORAL DAILY
Status: DISCONTINUED | OUTPATIENT
Start: 2024-05-20 | End: 2024-05-22 | Stop reason: HOSPADM

## 2024-05-19 RX ORDER — ONDANSETRON 4 MG/1
4 TABLET, ORALLY DISINTEGRATING ORAL EVERY 6 HOURS PRN
Status: DISCONTINUED | OUTPATIENT
Start: 2024-05-19 | End: 2024-05-20

## 2024-05-19 RX ORDER — TIZANIDINE 2 MG/1
4 TABLET ORAL ONCE
Status: COMPLETED | OUTPATIENT
Start: 2024-05-19 | End: 2024-05-19

## 2024-05-19 RX ORDER — MULTIPLE VITAMINS W/ MINERALS TAB 9MG-400MCG
1 TAB ORAL DAILY
Status: DISCONTINUED | OUTPATIENT
Start: 2024-05-20 | End: 2024-05-22 | Stop reason: HOSPADM

## 2024-05-19 RX ORDER — TIZANIDINE 2 MG/1
2-4 TABLET ORAL 3 TIMES DAILY PRN
Status: DISCONTINUED | OUTPATIENT
Start: 2024-05-19 | End: 2024-05-22 | Stop reason: HOSPADM

## 2024-05-19 RX ORDER — HYDROMORPHONE HYDROCHLORIDE 1 MG/ML
INJECTION, SOLUTION INTRAMUSCULAR; INTRAVENOUS; SUBCUTANEOUS
Status: COMPLETED
Start: 2024-05-19 | End: 2024-05-19

## 2024-05-19 RX ORDER — NALOXONE HYDROCHLORIDE 0.4 MG/ML
0.2 INJECTION, SOLUTION INTRAMUSCULAR; INTRAVENOUS; SUBCUTANEOUS
Status: DISCONTINUED | OUTPATIENT
Start: 2024-05-19 | End: 2024-05-22 | Stop reason: HOSPADM

## 2024-05-19 RX ORDER — HYDROMORPHONE HYDROCHLORIDE 1 MG/ML
0.5 INJECTION, SOLUTION INTRAMUSCULAR; INTRAVENOUS; SUBCUTANEOUS EVERY 30 MIN PRN
Status: COMPLETED | OUTPATIENT
Start: 2024-05-19 | End: 2024-05-19

## 2024-05-19 RX ORDER — CALCIUM CARBONATE 500 MG/1
1000 TABLET, CHEWABLE ORAL 4 TIMES DAILY PRN
Status: DISCONTINUED | OUTPATIENT
Start: 2024-05-19 | End: 2024-05-20

## 2024-05-19 RX ORDER — SPIRONOLACTONE 25 MG/1
25 TABLET ORAL DAILY
Status: DISCONTINUED | OUTPATIENT
Start: 2024-05-20 | End: 2024-05-22 | Stop reason: HOSPADM

## 2024-05-19 RX ORDER — PROCHLORPERAZINE MALEATE 5 MG
5 TABLET ORAL EVERY 6 HOURS PRN
Status: DISCONTINUED | OUTPATIENT
Start: 2024-05-19 | End: 2024-05-20

## 2024-05-19 RX ORDER — LEVOTHYROXINE SODIUM 25 UG/1
25 TABLET ORAL DAILY
Status: DISCONTINUED | OUTPATIENT
Start: 2024-05-20 | End: 2024-05-22 | Stop reason: HOSPADM

## 2024-05-19 RX ORDER — ONDANSETRON 2 MG/ML
4 INJECTION INTRAMUSCULAR; INTRAVENOUS EVERY 30 MIN PRN
Status: DISCONTINUED | OUTPATIENT
Start: 2024-05-19 | End: 2024-05-19

## 2024-05-19 RX ORDER — LIDOCAINE 40 MG/G
CREAM TOPICAL
Status: DISCONTINUED | OUTPATIENT
Start: 2024-05-19 | End: 2024-05-21

## 2024-05-19 RX ORDER — ENOXAPARIN SODIUM 100 MG/ML
40 INJECTION SUBCUTANEOUS EVERY 24 HOURS
Status: DISCONTINUED | OUTPATIENT
Start: 2024-05-19 | End: 2024-05-19

## 2024-05-19 RX ORDER — TRAMADOL HYDROCHLORIDE 50 MG/1
50 TABLET ORAL EVERY 6 HOURS PRN
Status: DISCONTINUED | OUTPATIENT
Start: 2024-05-19 | End: 2024-05-19

## 2024-05-19 RX ORDER — HYDROMORPHONE HYDROCHLORIDE 1 MG/ML
0.5 INJECTION, SOLUTION INTRAMUSCULAR; INTRAVENOUS; SUBCUTANEOUS
Status: DISCONTINUED | OUTPATIENT
Start: 2024-05-19 | End: 2024-05-19

## 2024-05-19 RX ORDER — HYDROCHLOROTHIAZIDE 12.5 MG/1
12.5 CAPSULE ORAL DAILY
Status: DISCONTINUED | OUTPATIENT
Start: 2024-05-20 | End: 2024-05-22 | Stop reason: HOSPADM

## 2024-05-19 RX ORDER — ORPHENADRINE CITRATE 30 MG/ML
60 INJECTION INTRAMUSCULAR; INTRAVENOUS ONCE
Status: COMPLETED | OUTPATIENT
Start: 2024-05-19 | End: 2024-05-19

## 2024-05-19 RX ORDER — AMOXICILLIN 250 MG
1 CAPSULE ORAL 2 TIMES DAILY PRN
Status: DISCONTINUED | OUTPATIENT
Start: 2024-05-19 | End: 2024-05-22 | Stop reason: HOSPADM

## 2024-05-19 RX ORDER — ACETAMINOPHEN 325 MG/1
650 TABLET ORAL EVERY 4 HOURS PRN
Status: DISCONTINUED | OUTPATIENT
Start: 2024-05-19 | End: 2024-05-20

## 2024-05-19 RX ORDER — PROCHLORPERAZINE 25 MG
12.5 SUPPOSITORY, RECTAL RECTAL EVERY 12 HOURS PRN
Status: DISCONTINUED | OUTPATIENT
Start: 2024-05-19 | End: 2024-05-20

## 2024-05-19 RX ORDER — ATORVASTATIN CALCIUM 10 MG/1
10 TABLET, FILM COATED ORAL DAILY
Status: DISCONTINUED | OUTPATIENT
Start: 2024-05-20 | End: 2024-05-22 | Stop reason: HOSPADM

## 2024-05-19 RX ORDER — ONDANSETRON 2 MG/ML
4 INJECTION INTRAMUSCULAR; INTRAVENOUS EVERY 6 HOURS PRN
Status: DISCONTINUED | OUTPATIENT
Start: 2024-05-19 | End: 2024-05-20

## 2024-05-19 RX ORDER — AMOXICILLIN 250 MG
2 CAPSULE ORAL 2 TIMES DAILY PRN
Status: DISCONTINUED | OUTPATIENT
Start: 2024-05-19 | End: 2024-05-22 | Stop reason: HOSPADM

## 2024-05-19 RX ORDER — CALCIUM CARBONATE 500 MG/1
1000 TABLET, CHEWABLE ORAL 4 TIMES DAILY PRN
Status: DISCONTINUED | OUTPATIENT
Start: 2024-05-19 | End: 2024-05-19

## 2024-05-19 RX ADMIN — HYDROMORPHONE HYDROCHLORIDE 1 MG: 1 INJECTION, SOLUTION INTRAMUSCULAR; INTRAVENOUS; SUBCUTANEOUS at 17:07

## 2024-05-19 RX ADMIN — SODIUM CHLORIDE 1000 ML: 9 INJECTION, SOLUTION INTRAVENOUS at 19:14

## 2024-05-19 RX ADMIN — ONDANSETRON 4 MG: 2 INJECTION INTRAMUSCULAR; INTRAVENOUS at 15:20

## 2024-05-19 RX ADMIN — HYDROMORPHONE HYDROCHLORIDE 0.5 MG: 1 INJECTION, SOLUTION INTRAMUSCULAR; INTRAVENOUS; SUBCUTANEOUS at 22:52

## 2024-05-19 RX ADMIN — ORPHENADRINE CITRATE 60 MG: 60 INJECTION INTRAMUSCULAR; INTRAVENOUS at 16:15

## 2024-05-19 RX ADMIN — HYDROMORPHONE HYDROCHLORIDE 0.5 MG: 1 INJECTION, SOLUTION INTRAMUSCULAR; INTRAVENOUS; SUBCUTANEOUS at 18:05

## 2024-05-19 RX ADMIN — ONDANSETRON 4 MG: 4 TABLET, ORALLY DISINTEGRATING ORAL at 22:55

## 2024-05-19 RX ADMIN — SODIUM CHLORIDE 1000 ML: 9 INJECTION, SOLUTION INTRAVENOUS at 15:38

## 2024-05-19 RX ADMIN — HYDROMORPHONE HYDROCHLORIDE 0.5 MG: 1 INJECTION, SOLUTION INTRAMUSCULAR; INTRAVENOUS; SUBCUTANEOUS at 16:09

## 2024-05-19 RX ADMIN — TIZANIDINE 4 MG: 2 TABLET ORAL at 17:54

## 2024-05-19 RX ADMIN — TIZANIDINE 4 MG: 2 TABLET ORAL at 22:53

## 2024-05-19 RX ADMIN — HYDROMORPHONE HYDROCHLORIDE 0.5 MG: 1 INJECTION, SOLUTION INTRAMUSCULAR; INTRAVENOUS; SUBCUTANEOUS at 15:24

## 2024-05-19 ASSESSMENT — COLUMBIA-SUICIDE SEVERITY RATING SCALE - C-SSRS
1. IN THE PAST MONTH, HAVE YOU WISHED YOU WERE DEAD OR WISHED YOU COULD GO TO SLEEP AND NOT WAKE UP?: NO
6. HAVE YOU EVER DONE ANYTHING, STARTED TO DO ANYTHING, OR PREPARED TO DO ANYTHING TO END YOUR LIFE?: NO
2. HAVE YOU ACTUALLY HAD ANY THOUGHTS OF KILLING YOURSELF IN THE PAST MONTH?: NO

## 2024-05-19 ASSESSMENT — ACTIVITIES OF DAILY LIVING (ADL)
DESCRIBE_HEARING_LOSS: BILATERAL HEARING LOSS
ADLS_ACUITY_SCORE: 38
CHANGE_IN_FUNCTIONAL_STATUS_SINCE_ONSET_OF_CURRENT_ILLNESS/INJURY: NO
ADLS_ACUITY_SCORE: 23
ADLS_ACUITY_SCORE: 23
WERE_AUXILIARY_AIDS_OFFERED?: YES
CONCENTRATING,_REMEMBERING_OR_MAKING_DECISIONS_DIFFICULTY: NO
WALKING_OR_CLIMBING_STAIRS_DIFFICULTY: NO
DIFFICULTY_COMMUNICATING: NO
ADLS_ACUITY_SCORE: 23
WEAR_GLASSES_OR_BLIND: NO
ADLS_ACUITY_SCORE: 38
DOING_ERRANDS_INDEPENDENTLY_DIFFICULTY: NO
FALL_HISTORY_WITHIN_LAST_SIX_MONTHS: YES
HEARING_DIFFICULTY_OR_DEAF: YES
DIFFICULTY_EATING/SWALLOWING: NO
TOILETING_ISSUES: NO
NUMBER_OF_TIMES_PATIENT_HAS_FALLEN_WITHIN_LAST_SIX_MONTHS: 4
ADLS_ACUITY_SCORE: 38
DRESSING/BATHING_DIFFICULTY: NO

## 2024-05-19 NOTE — ED TRIAGE NOTES
"Patient presents via EMS after falling outside and landing on a pallet. Patient is complaining of L hip pain. Per EMS neighbor states that the patient may have been down for a few hours and patient agrees with that statement. Patient states that it felt like a \"snap crackle and pop\" - no shortening or rotating per EMS. Given 150 mcg fent IV (20 gauge RAC) by EMS without relief. Denies LOC and no blood thinners. Patient lives alone.        "

## 2024-05-19 NOTE — CONSULTS
Have reviewed images and discussed patient with emergency dept  Will proceed with cephalomedullary fixation L high subtrochanteric fracture using S[employer] TFN  Plan to OR tomorrow if medially optimized    M Dohm

## 2024-05-19 NOTE — ED PROVIDER NOTES
"  History     Chief Complaint   Patient presents with    Fall     HPI  Gucci Logan is a 73 year old male who presents via EMS from home after unwitnessed fall.  He was working outside, said that he got his feet caught in a pallet and ended up tripping.  Fell on his left hip.  Was unable to get up.  His neighbors think may be rest was out for a few hours.  Rest describes it as happening fast, but felt a \"snap, crackle, pop\" from his hip all the way down his leg.  He does have a history of issues with this hip, no previous hip surgery, but he has had an injection.  He is working with a provider at [facility] and had an injection for bursitis.  They were planning to do an injection into the hip joint since his pain is not getting any better, but he is following with hematology over some abnormal blood work.  He says that they have not gotten back to him so he is not sure he can get the injection scheduled.    Rest did not feel lightheadedness or dizziness, no chest pain or shortness of breath that caused him to fall.  He said the only pain is in his hip and leg.  Does not believe that he lost consciousness.  He denies being on any blood thinners.  Was given 150 mcg of fentanyl IV by EMS and route to the emergency room, but did not have any relief of his pain.    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) 02/28/2023     Priority: Medium    " Compression fracture of T6 vertebra with routine healing 02/20/2023     Priority: Medium    Syncope, unspecified syncope type 02/20/2023     Priority: Medium    Fall 02/20/2023     Priority: Medium    Abdominal pain 07/09/2020     Priority: Medium    Portal vein thrombosis 07/09/2020     Priority: Medium    Non-insulin dependent type 2 diabetes mellitus (H) 02/20/2020     Priority: Medium    Diabetes mellitus, type 2 (H) 11/09/2018     Priority: Medium    Hyperlipidemia LDL goal <100 04/19/2017     Priority: Medium    Impaired fasting glucose 04/19/2017     Priority: Medium    Gastroesophageal reflux disease, esophagitis presence not specified 10/06/2016     Priority: Medium     IMO Regulatory Load OCT 2020      Obesity, unspecified obesity severity, unspecified obesity type 12/17/2015     Priority: Medium    SHONDA (obstructive sleep apnea) 02/07/2014     Priority: Medium    Hypertension goal BP (blood pressure) < 140/90 02/08/2013     Priority: Medium    Vitamin D deficiency 09/19/2012     Priority: Medium     Overview:   9/19/2012      Osteoarthrosis 09/18/2012     Priority: Medium     Overview:   Lumbar spine, knees      Left ventricular hypertrophy 11/18/2011     Priority: Medium     Overview:   11/18/2011      Thrombocytopenia (H24) 08/28/2008     Priority: Medium     Overview:   8/28/2008, attributed to liver disease with portal hypertension and functional splenomegaly      Splenomegaly 07/25/2008     Priority: Medium     Overview:   7/25/2009, attributed to portal hypertension from cirrhosis      Lymphadenopathy 06/20/2008     Priority: Medium     Overview:   6/20/2008 5/31/2011, CT: Increasing mediastinal and hilar lymphadenopathy and mild increase in perigastric lymph node. Findings are concerning for atypical infectious process. In the absence of pulmonary findings, with lymphomatous disorder in the differential. Clinical correlation is recommended.      Jaundice 05/31/2008     Priority: Medium    Chronic  hepatitis C virus infection (H) 05/31/2008     Priority: Medium        Past Medical History:    Past Medical History:   Diagnosis Date    Arthritis     Chronic, continuous use of opioids     Esophageal reflux 03/01/2014    Hearing problem     Hiatal hernia     Hypertension     Jaundice 05/31/2008    Liver disease     Obesity 01/24/2013    Obstructive sleep apnea     Sleep apnea     Syncope, unspecified syncope type 2/20/2023       Past Surgical History:    Past Surgical History:   Procedure Laterality Date    ARTHROSCOPY KNEE RT/LT      (L) with partial medial meniscectomy    CATARACT IOL, RT/LT  Nov and Dec 2017    COLONOSCOPY N/A 4/24/2019    Procedure: COLONOSCOPY, WITH POLYPECTOMY AND BIOPSY;  Surgeon: Leventhal, Thomas Michael, MD;  Location: [facility] OR    COLONOSCOPY N/A 9/14/2022    Procedure: COLONOSCOPY;  Surgeon: Rafa Renee MD;  Location:  GI    DACRYOCYSTORHINOSTOMY Left 10/16/2018    Procedure: DACRYOCYSTORHINOSTOMY;  Surgeon: Madhu Krause MD;  Location: [facility]    ENDOSCOPIC ENDONASAL SURGERY  1994    ENDOSCOPY  2-19-15    ESOPHAGOSCOPY, GASTROSCOPY, DUODENOSCOPY (EGD), COMBINED N/A 4/24/2019    Procedure: COMBINED ESOPHAGOSCOPY, GASTROSCOPY, DUODENOSCOPY (EGD) - hold aspirin ibuprofen or naproxen for one week prior (per physician order);  Surgeon: Leventhal, Thomas Michael, MD;  Location: [facility] OR    ESOPHAGOSCOPY, GASTROSCOPY, DUODENOSCOPY (EGD), COMBINED N/A 5/23/2023    Procedure: Esophagoscopy, gastroscopy, duodenoscopy, combined;  Surgeon: Rafa Renee MD;  Location:  GI    EYE SURGERY      Hernia surgery Left 1994    NASAL/SINUS POLYPECTOMY      REPAIR PTOSIS Left 10/16/2018    Procedure: LEFT UPPER LID PTOSIS AND BILATERAL  BROW PTOSIS REPAIR WITH LEFT DACRYOCYSTORHINOSTOMY ;  Surgeon: Madhu Krause MD;  Location: [facility]    REPAIR PTOSIS BROW Bilateral 10/16/2018    Procedure: REPAIR PTOSIS BROW;  Surgeon: Madhu Krause MD;  Location: [facility]    ROTATOR CUFF REPAIR  RT/LT Right     ZZC OPEN RX ANKLE DISLOCATN+FIXATN      (R)    ZZC SPINAL FUSION,ANT,EA ADNL LEVEL      T12 - L1       Family History:    Family History   Problem Relation Age of Onset    Alzheimer Disease Mother 85    Dementia Mother     Cerebrovascular Disease Father 50    Cancer Father     Hypertension Father     Neurologic Disorder No family hx of     Diabetes No family hx of        Social History:  Marital Status:  Single [1]  Social History     Tobacco Use    Smoking status: Former     Current packs/day: 0.00     Types: Cigarettes     Start date: 1990     Quit date: 1999     Years since quittin.3     Passive exposure: Never    Smokeless tobacco: Never   Vaping Use    Vaping status: Never Used   Substance Use Topics    Alcohol use: Yes     Comment: rare    Drug use: No        Medications:    atorvastatin (LIPITOR) 10 MG tablet  diclofenac (VOLTAREN) 1 % topical gel  hydrochlorothiazide (HYDRODIURIL) 12.5 MG tablet  levothyroxine (SYNTHROID/LEVOTHROID) 25 MCG tablet  metFORMIN (GLUCOPHAGE) 500 MG tablet  metoprolol succinate ER (TOPROL XL) 100 MG 24 hr tablet  multivitamin w/minerals (THERA-VIT-M) tablet  omeprazole (PRILOSEC) 20 MG DR capsule  spironolactone (ALDACTONE) 25 MG tablet  tiZANidine (ZANAFLEX) 4 MG tablet  traMADol (ULTRAM) 50 MG tablet  valsartan (DIOVAN) 160 MG tablet          Review of Systems   All other systems reviewed and are negative.      Physical Exam   BP: 114/74  Pulse: 63  Temp: 98  F (36.7  C)  Resp: 18  SpO2: 96 %      Physical Exam  Vitals and nursing note reviewed.   Constitutional:       General: He is in acute distress (Spasms of pain).   HENT:      Head: Normocephalic and atraumatic.   Cardiovascular:      Pulses:           Dorsalis pedis pulses are 2+ on the right side and 2+ on the left side.   Musculoskeletal:      Cervical back: Normal range of motion. No tenderness.      Left hip: Tenderness present. No deformity. Decreased range of motion.      Comments: No leg  length discrepancy, no internal/external rotation of the left extremity.   Skin:     General: Skin is warm.      Findings: Bruising (Large bruise over left lateral greater trochanter) present.   Neurological:      Mental Status: He is alert.         ED Course        Procedures              Critical Care time:  none               Results for orders placed or performed during the hospital encounter of 05/19/24 (from the past 24 hour(s))   CBC with platelets differential    Narrative    The following orders were created for panel order CBC with platelets differential.  Procedure                               Abnormality         Status                     ---------                               -----------         ------                     CBC with platelets and d...[[id number]]                                                   Please view results for these tests on the individual orders.   Basic metabolic panel   Result Value Ref Range    Sodium 139 135 - 145 mmol/L    Potassium 4.2 3.4 - 5.3 mmol/L    Chloride 104 98 - 107 mmol/L    Carbon Dioxide (CO2) 24 22 - 29 mmol/L    Anion Gap 11 7 - 15 mmol/L    Urea Nitrogen 27.7 (H) 8.0 - 23.0 mg/dL    Creatinine 1.37 (H) 0.67 - 1.17 mg/dL    GFR Estimate 55 (L) >60 mL/min/1.73m2    Calcium 9.0 8.8 - 10.2 mg/dL    Glucose 122 (H) 70 - 99 mg/dL   CK total   Result Value Ref Range     (H) 39 - 308 U/L   XR Pelvis w Hip Left G/E 2 Views    Narrative    EXAM: XR PELVIS AND HIP LEFT 2 VIEWS  LOCATION: [facility]  DATE: 5/19/2024    INDICATION: Fall, hip pain, history of bursitis and recent injection  COMPARISON: None.      Impression    IMPRESSION: Fracture through the left femur at the intertrochanteric/subtrochanteric junction. No dislocation. Mild degenerative change both hip joints. Right hip negative for fracture. Pelvis negative for fracture.   CT Head w/o Contrast    Narrative    EXAM: CT HEAD W/O CONTRAST  LOCATION: [facility]  DATE: 5/19/2024    INDICATION: Fall, unknown head trauma.  COMPARISON: CT 12/6/2021.  TECHNIQUE: Routine CT Head without IV contrast. Multiplanar reformats. Dose reduction techniques were used.    FINDINGS:  INTRACRANIAL CONTENTS: No intracranial hemorrhage, extraaxial collection, or mass effect.  No CT evidence of acute infarct. Mild presumed chronic small vessel ischemic changes. Mild generalized volume loss. No hydrocephalus.     VISUALIZED ORBITS/SINUSES/MASTOIDS: No intraorbital abnormality. No paranasal sinus mucosal disease. No middle ear or mastoid effusion.    BONES/SOFT TISSUES: No acute abnormality.      Impression    IMPRESSION:  1.  No CT evidence for acute intracranial process.  2.  Mild chronic microvascular ischemic changes as above.   CT Cervical Spine w/o Contrast    Narrative    EXAM: CT CERVICAL SPINE W/O CONTRAST  LOCATION: [facility]  DATE: 5/19/2024    INDICATION: Fall, possible head injury, no neck pain  COMPARISON: None.  TECHNIQUE: Routine CT Cervical Spine without IV contrast. Multiplanar reformats. Dose reduction techniques were used.    FINDINGS:  VERTEBRA: Normal vertebral body heights and alignment. No fracture or posttraumatic subluxation.     CANAL/FORAMINA: C3-4, moderate right foraminal stenosis. At C5-6, severe central and severe bilateral foraminal stenosis. At C6-7, severe central and severe bilateral foraminal stenosis.    PARASPINAL: No extraspinal abnormality.      Impression    IMPRESSION:  1.  No fracture or posttraumatic subluxation.  2.  High-grade central and bilateral foraminal stenosis at C5-6 and C6-7..       Medications   ondansetron (ZOFRAN) injection 4 mg (4 mg Intravenous $Given 5/19/24 1520)   sodium chloride 0.9% BOLUS 1,000 mL (has no administration in time range)   HYDROmorphone (PF) (DILAUDID) injection 0.5 mg (0.5 mg Intravenous $Given 5/19/24 1807)   sodium chloride 0.9% BOLUS 1,000 mL (0 mLs  Intravenous Stopped 5/19/24 5153)   orphenadrine (NORFLEX) injection 60 mg (60 mg Intravenous $Given 5/19/24 1615)   HYDROmorphone (DILAUDID) injection 1 mg (1 mg Intravenous $Given 5/19/24 1707)   tiZANidine (ZANAFLEX) tablet 4 mg (4 mg Oral $Given 5/19/24 0998)       Assessments & Plan (with Medical Decision Making)  Canelo is a 73-year-old male presenting via EMS from home after a fall onto his left hip.  See history and physical exam as above  73-year-old male who is in acute distress secondary to pain, will have waves and spasms of pain where he grimaces and yells, grabs at his left hip.  There is a round area of ecchymosis on the lateral left femur and greater trochanter area, but no fluctuance or surrounding erythema.  He has equal leg lengths, no internal or external rotation.  Strong DP pulse palpated and normal cap refill.  Was given 150 mcg of fentanyl by EMS and route but it is not helping with his pain.  Was given a dose of 1 mg of Dilaudid IV to try to improve his pain.  Will plan to get CBC, BMP, and CK since he was laying for several hours.  Will get a head CT and cervical spine CT, as he did have an unwitnessed fall and has a distracting injury.  Will also get an x-ray of the hips and pelvis to rule out acute fracture  X-ray and CT as above.  No evidence of acute intracranial hemorrhage or traumatic injury, cervical spine did not show acute traumatic injury.  There is a left femur fracture at the intertrochanteric/subtrochanteric junction.  No evidence of fracture of the right hip or pelvis.  Discussed case with Dr. Wagner, orthopedic surgery on-call.  He had reviewed the images, and will plan to operate in the morning.  Recommends the patient be n.p.o. at midnight after receiving clearance for surgery from hospitalist team.  Patient was continuing to have spasms of pain that was not relieved with additional IV Dilaudid or IV Norflex.  Was given a dose of oral tizanidine since he states this has helped  with his muscle spasm previously.  Also given additional IV fluid bolus, hopefully this will help with the elevation in CK and his elevated creatinine.     I have reviewed the nursing notes.    I have reviewed the findings, diagnosis, plan and need for follow up with the patient.       ED to Inpatient Handoff:    Discussed with Dr. Huerta at 1910  Patient accepted for Observation Stay  Pending studies include CBC  Code Status: Not Addressed             Medical Decision Making  The patient's presentation was of moderate complexity (an acute complicated injury).    The patient's evaluation involved:  ordering and/or review of 3+ test(s) in this encounter (see separate area of note for details)  discussion of management or test interpretation with another health professional (orthopedic surgery)    The patient's management necessitated high risk (a decision regarding hospitalization).        New Prescriptions    No medications on file       Final diagnoses:   Intertrochanteric fracture of left femur, closed, initial encounter (H)   Fall from standing, initial encounter       5/19/2024   [facility] EMERGENCY DEPT       Cora Damon DO  05/19/24 1912

## 2024-05-20 ENCOUNTER — APPOINTMENT (OUTPATIENT)
Dept: GENERAL RADIOLOGY | Facility: CLINIC | Age: 74
DRG: 480 | End: 2024-05-20
Attending: ORTHOPAEDIC SURGERY
Payer: COMMERCIAL

## 2024-05-20 ENCOUNTER — ANESTHESIA EVENT (OUTPATIENT)
Dept: SURGERY | Facility: CLINIC | Age: 74
DRG: 480 | End: 2024-05-20
Payer: COMMERCIAL

## 2024-05-20 ENCOUNTER — ANESTHESIA (OUTPATIENT)
Dept: SURGERY | Facility: CLINIC | Age: 74
DRG: 480 | End: 2024-05-20
Payer: COMMERCIAL

## 2024-05-20 ENCOUNTER — APPOINTMENT (OUTPATIENT)
Dept: GENERAL RADIOLOGY | Facility: CLINIC | Age: 74
DRG: 480 | End: 2024-05-20
Attending: PHYSICIAN ASSISTANT
Payer: COMMERCIAL

## 2024-05-20 ENCOUNTER — TELEPHONE (OUTPATIENT)
Dept: ONCOLOGY | Facility: CLINIC | Age: 74
End: 2024-05-20
Payer: COMMERCIAL
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
## 2024-05-20 LAB
ABO/RH(D): NORMAL
ALBUMIN SERPL BCG-MCNC: 3.3 G/DL (ref 3.5–5.2)
ALP SERPL-CCNC: 138 U/L (ref 40–150)
ALT SERPL W P-5'-P-CCNC: 35 U/L (ref 0–70)
ANION GAP SERPL CALCULATED.3IONS-SCNC: 8 MMOL/L (ref 7–15)
ANTIBODY SCREEN: NEGATIVE
AST SERPL W P-5'-P-CCNC: 48 U/L (ref 0–45)
BILIRUB DIRECT SERPL-MCNC: 0.39 MG/DL (ref 0–0.3)
BILIRUB SERPL-MCNC: 2 MG/DL
BLD PROD TYP BPU: NORMAL
BLOOD COMPONENT TYPE: NORMAL
BUN SERPL-MCNC: 27.8 MG/DL (ref 8–23)
CALCIUM SERPL-MCNC: 8.4 MG/DL (ref 8.8–10.2)
CHLORIDE SERPL-SCNC: 109 MMOL/L (ref 98–107)
CODING SYSTEM: NORMAL
CREAT SERPL-MCNC: 1.38 MG/DL (ref 0.67–1.17)
DEPRECATED HCO3 PLAS-SCNC: 23 MMOL/L (ref 22–29)
EGFRCR SERPLBLD CKD-EPI 2021: 54 ML/MIN/1.73M2
ERYTHROCYTE [DISTWIDTH] IN BLOOD BY AUTOMATED COUNT: 13.9 % (ref 10–15)
ERYTHROCYTE [DISTWIDTH] IN BLOOD BY AUTOMATED COUNT: 14.4 % (ref 10–15)
GLUCOSE BLDC GLUCOMTR-MCNC: 109 MG/DL (ref 70–99)
GLUCOSE BLDC GLUCOMTR-MCNC: 112 MG/DL (ref 70–99)
GLUCOSE BLDC GLUCOMTR-MCNC: 126 MG/DL (ref 70–99)
GLUCOSE BLDC GLUCOMTR-MCNC: 187 MG/DL (ref 70–99)
GLUCOSE SERPL-MCNC: 112 MG/DL (ref 70–99)
HCT VFR BLD AUTO: 31.6 % (ref 40–53)
HCT VFR BLD AUTO: 35.3 % (ref 40–53)
HGB BLD-MCNC: 10.2 G/DL (ref 13.3–17.7)
HGB BLD-MCNC: 11 G/DL (ref 13.3–17.7)
INR PPP: 1.35 (ref 0.85–1.15)
ISSUE DATE AND TIME: NORMAL
MCH RBC QN AUTO: 31.5 PG (ref 26.5–33)
MCH RBC QN AUTO: 32.1 PG (ref 26.5–33)
MCHC RBC AUTO-ENTMCNC: 31.2 G/DL (ref 31.5–36.5)
MCHC RBC AUTO-ENTMCNC: 32.3 G/DL (ref 31.5–36.5)
MCV RBC AUTO: 103 FL (ref 78–100)
MCV RBC AUTO: 98 FL (ref 78–100)
PLATELET # BLD AUTO: 127 10E3/UL (ref 150–450)
PLATELET # BLD AUTO: 55 10E3/UL (ref 150–450)
POTASSIUM SERPL-SCNC: 4 MMOL/L (ref 3.4–5.3)
PROT SERPL-MCNC: 5.8 G/DL (ref 6.4–8.3)
RBC # BLD AUTO: 3.24 10E6/UL (ref 4.4–5.9)
RBC # BLD AUTO: 3.43 10E6/UL (ref 4.4–5.9)
SODIUM SERPL-SCNC: 140 MMOL/L (ref 135–145)
SPECIMEN EXPIRATION DATE: NORMAL
UNIT ABO/RH: NORMAL
UNIT NUMBER: NORMAL
UNIT STATUS: NORMAL
UNIT TYPE ISBT: 6200
WBC # BLD AUTO: 15 10E3/UL (ref 4–11)
WBC # BLD AUTO: 5.2 10E3/UL (ref 4–11)

## 2024-05-20 PROCEDURE — C1713 ANCHOR/SCREW BN/BN,TIS/BN: HCPCS | Performed by: ORTHOPAEDIC SURGERY

## 2024-05-20 PROCEDURE — 82962 GLUCOSE BLOOD TEST: CPT

## 2024-05-20 PROCEDURE — 120N000001 HC R&B MED SURG/OB

## 2024-05-20 PROCEDURE — 36415 COLL VENOUS BLD VENIPUNCTURE: CPT | Performed by: INTERNAL MEDICINE

## 2024-05-20 PROCEDURE — 258N000003 HC RX IP 258 OP 636: Performed by: NURSE PRACTITIONER

## 2024-05-20 PROCEDURE — 36415 COLL VENOUS BLD VENIPUNCTURE: CPT | Performed by: NURSE PRACTITIONER

## 2024-05-20 PROCEDURE — 250N000013 HC RX MED GY IP 250 OP 250 PS 637: Performed by: INTERNAL MEDICINE

## 2024-05-20 PROCEDURE — P9035 PLATELET PHERES LEUKOREDUCED: HCPCS | Performed by: NURSE PRACTITIONER

## 2024-05-20 PROCEDURE — 85027 COMPLETE CBC AUTOMATED: CPT | Performed by: NURSE ANESTHETIST, CERTIFIED REGISTERED

## 2024-05-20 PROCEDURE — 99232 SBSQ HOSP IP/OBS MODERATE 35: CPT | Performed by: NURSE PRACTITIONER

## 2024-05-20 PROCEDURE — 250N000011 HC RX IP 250 OP 636: Performed by: NURSE ANESTHETIST, CERTIFIED REGISTERED

## 2024-05-20 PROCEDURE — 999N000181 XR SURGERY CARM FLUORO GREATER THAN 5 MIN W STILLS: Mod: TC

## 2024-05-20 PROCEDURE — 96376 TX/PRO/DX INJ SAME DRUG ADON: CPT

## 2024-05-20 PROCEDURE — 250N000011 HC RX IP 250 OP 636: Performed by: ORTHOPAEDIC SURGERY

## 2024-05-20 PROCEDURE — 250N000009 HC RX 250: Performed by: NURSE ANESTHETIST, CERTIFIED REGISTERED

## 2024-05-20 PROCEDURE — 80053 COMPREHEN METABOLIC PANEL: CPT | Performed by: INTERNAL MEDICINE

## 2024-05-20 PROCEDURE — 86900 BLOOD TYPING SEROLOGIC ABO: CPT | Performed by: NURSE PRACTITIONER

## 2024-05-20 PROCEDURE — 88304 TISSUE EXAM BY PATHOLOGIST: CPT | Mod: TC | Performed by: ORTHOPAEDIC SURGERY

## 2024-05-20 PROCEDURE — 272N000001 HC OR GENERAL SUPPLY STERILE: Performed by: ORTHOPAEDIC SURGERY

## 2024-05-20 PROCEDURE — 85027 COMPLETE CBC AUTOMATED: CPT | Performed by: INTERNAL MEDICINE

## 2024-05-20 PROCEDURE — C1769 GUIDE WIRE: HCPCS | Performed by: ORTHOPAEDIC SURGERY

## 2024-05-20 PROCEDURE — 360N000084 HC SURGERY LEVEL 4 W/ FLUORO, PER MIN: Performed by: ORTHOPAEDIC SURGERY

## 2024-05-20 PROCEDURE — 27245 TREAT THIGH FRACTURE: CPT | Mod: LT | Performed by: ORTHOPAEDIC SURGERY

## 2024-05-20 PROCEDURE — 73502 X-RAY EXAM HIP UNI 2-3 VIEWS: CPT

## 2024-05-20 PROCEDURE — 80048 BASIC METABOLIC PNL TOTAL CA: CPT | Performed by: INTERNAL MEDICINE

## 2024-05-20 PROCEDURE — 250N000011 HC RX IP 250 OP 636: Performed by: INTERNAL MEDICINE

## 2024-05-20 PROCEDURE — 370N000017 HC ANESTHESIA TECHNICAL FEE, PER MIN: Performed by: ORTHOPAEDIC SURGERY

## 2024-05-20 PROCEDURE — 710N000010 HC RECOVERY PHASE 1, LEVEL 2, PER MIN: Performed by: ORTHOPAEDIC SURGERY

## 2024-05-20 PROCEDURE — 82248 BILIRUBIN DIRECT: CPT | Performed by: NURSE PRACTITIONER

## 2024-05-20 PROCEDURE — 258N000003 HC RX IP 258 OP 636: Performed by: NURSE ANESTHETIST, CERTIFIED REGISTERED

## 2024-05-20 PROCEDURE — 85610 PROTHROMBIN TIME: CPT | Performed by: NURSE PRACTITIONER

## 2024-05-20 PROCEDURE — G0378 HOSPITAL OBSERVATION PER HR: HCPCS

## 2024-05-20 PROCEDURE — 0QS706Z REPOSITION LEFT UPPER FEMUR WITH INTRAMEDULLARY INTERNAL FIXATION DEVICE, OPEN APPROACH: ICD-10-PCS | Performed by: ORTHOPAEDIC SURGERY

## 2024-05-20 PROCEDURE — 250N000026 HC DESFLURANE, PER MIN: Performed by: ORTHOPAEDIC SURGERY

## 2024-05-20 DEVICE — IMP SCR SYN TFNA FENESTRATED LAG 105MM 04.038.205S: Type: IMPLANTABLE DEVICE | Site: FEMUR | Status: FUNCTIONAL

## 2024-05-20 DEVICE — IMP SCR SYN 5.0 TI LOCK T25 STARDRIVE 44MM 04.005.534S: Type: IMPLANTABLE DEVICE | Site: FEMUR | Status: FUNCTIONAL

## 2024-05-20 DEVICE — IMPLANTABLE DEVICE: Type: IMPLANTABLE DEVICE | Site: FEMUR | Status: FUNCTIONAL

## 2024-05-20 RX ORDER — BUPIVACAINE HYDROCHLORIDE 2.5 MG/ML
INJECTION, SOLUTION INFILTRATION; PERINEURAL PRN
Status: DISCONTINUED | OUTPATIENT
Start: 2024-05-20 | End: 2024-05-20 | Stop reason: HOSPADM

## 2024-05-20 RX ORDER — CEFAZOLIN SODIUM/WATER 2 G/20 ML
SYRINGE (ML) INTRAVENOUS PRN
Status: DISCONTINUED | OUTPATIENT
Start: 2024-05-20 | End: 2024-05-20

## 2024-05-20 RX ORDER — CALCIUM CARBONATE 500 MG/1
500 TABLET, CHEWABLE ORAL 4 TIMES DAILY PRN
Status: DISCONTINUED | OUTPATIENT
Start: 2024-05-20 | End: 2024-05-22 | Stop reason: HOSPADM

## 2024-05-20 RX ORDER — HYDROMORPHONE HCL IN WATER/PF 6 MG/30 ML
0.4 PATIENT CONTROLLED ANALGESIA SYRINGE INTRAVENOUS
Status: DISCONTINUED | OUTPATIENT
Start: 2024-05-20 | End: 2024-05-22 | Stop reason: HOSPADM

## 2024-05-20 RX ORDER — ACETAMINOPHEN 325 MG/1
975 TABLET ORAL EVERY 8 HOURS
Qty: 27 TABLET | Refills: 0 | Status: DISCONTINUED | OUTPATIENT
Start: 2024-05-21 | End: 2024-05-22 | Stop reason: HOSPADM

## 2024-05-20 RX ORDER — ONDANSETRON 2 MG/ML
4 INJECTION INTRAMUSCULAR; INTRAVENOUS EVERY 6 HOURS PRN
Status: DISCONTINUED | OUTPATIENT
Start: 2024-05-20 | End: 2024-05-22 | Stop reason: HOSPADM

## 2024-05-20 RX ORDER — LABETALOL HYDROCHLORIDE 5 MG/ML
10 INJECTION, SOLUTION INTRAVENOUS
Status: DISCONTINUED | OUTPATIENT
Start: 2024-05-20 | End: 2024-05-20 | Stop reason: HOSPADM

## 2024-05-20 RX ORDER — PROPOFOL 10 MG/ML
INJECTION, EMULSION INTRAVENOUS PRN
Status: DISCONTINUED | OUTPATIENT
Start: 2024-05-20 | End: 2024-05-20

## 2024-05-20 RX ORDER — LIDOCAINE HYDROCHLORIDE 20 MG/ML
INJECTION, SOLUTION INFILTRATION; PERINEURAL PRN
Status: DISCONTINUED | OUTPATIENT
Start: 2024-05-20 | End: 2024-05-20

## 2024-05-20 RX ORDER — POLYETHYLENE GLYCOL 3350 17 G/17G
17 POWDER, FOR SOLUTION ORAL DAILY
Status: DISCONTINUED | OUTPATIENT
Start: 2024-05-21 | End: 2024-05-22 | Stop reason: HOSPADM

## 2024-05-20 RX ORDER — SODIUM CHLORIDE 9 MG/ML
INJECTION, SOLUTION INTRAVENOUS CONTINUOUS
Status: DISCONTINUED | OUTPATIENT
Start: 2024-05-20 | End: 2024-05-22 | Stop reason: HOSPADM

## 2024-05-20 RX ORDER — MEPERIDINE HYDROCHLORIDE 25 MG/ML
12.5 INJECTION INTRAMUSCULAR; INTRAVENOUS; SUBCUTANEOUS EVERY 5 MIN PRN
Status: COMPLETED | OUTPATIENT
Start: 2024-05-20 | End: 2024-05-20

## 2024-05-20 RX ORDER — LIDOCAINE 40 MG/G
CREAM TOPICAL
Status: DISCONTINUED | OUTPATIENT
Start: 2024-05-20 | End: 2024-05-22 | Stop reason: HOSPADM

## 2024-05-20 RX ORDER — ONDANSETRON 4 MG/1
4 TABLET, ORALLY DISINTEGRATING ORAL EVERY 6 HOURS PRN
Status: DISCONTINUED | OUTPATIENT
Start: 2024-05-20 | End: 2024-05-22 | Stop reason: HOSPADM

## 2024-05-20 RX ORDER — HYDROMORPHONE HYDROCHLORIDE 1 MG/ML
0.2 INJECTION, SOLUTION INTRAMUSCULAR; INTRAVENOUS; SUBCUTANEOUS EVERY 5 MIN PRN
Status: DISCONTINUED | OUTPATIENT
Start: 2024-05-20 | End: 2024-05-20 | Stop reason: HOSPADM

## 2024-05-20 RX ORDER — DEXAMETHASONE SODIUM PHOSPHATE 10 MG/ML
4 INJECTION, SOLUTION INTRAMUSCULAR; INTRAVENOUS
Status: COMPLETED | OUTPATIENT
Start: 2024-05-20 | End: 2024-05-20

## 2024-05-20 RX ORDER — OXYCODONE HYDROCHLORIDE 5 MG/1
10 TABLET ORAL EVERY 4 HOURS PRN
Status: DISCONTINUED | OUTPATIENT
Start: 2024-05-20 | End: 2024-05-22 | Stop reason: HOSPADM

## 2024-05-20 RX ORDER — HYDROMORPHONE HCL IN WATER/PF 6 MG/30 ML
0.2 PATIENT CONTROLLED ANALGESIA SYRINGE INTRAVENOUS
Status: DISCONTINUED | OUTPATIENT
Start: 2024-05-20 | End: 2024-05-22 | Stop reason: HOSPADM

## 2024-05-20 RX ORDER — CEFAZOLIN SODIUM/WATER 2 G/20 ML
2 SYRINGE (ML) INTRAVENOUS EVERY 8 HOURS
Qty: 40 ML | Refills: 0 | Status: COMPLETED | OUTPATIENT
Start: 2024-05-21 | End: 2024-05-21

## 2024-05-20 RX ORDER — FENTANYL CITRATE 50 UG/ML
INJECTION, SOLUTION INTRAMUSCULAR; INTRAVENOUS PRN
Status: DISCONTINUED | OUTPATIENT
Start: 2024-05-20 | End: 2024-05-20

## 2024-05-20 RX ORDER — OXYCODONE HYDROCHLORIDE 5 MG/1
5 TABLET ORAL EVERY 4 HOURS PRN
Status: DISCONTINUED | OUTPATIENT
Start: 2024-05-20 | End: 2024-05-22 | Stop reason: HOSPADM

## 2024-05-20 RX ORDER — FENTANYL CITRATE 50 UG/ML
50 INJECTION, SOLUTION INTRAMUSCULAR; INTRAVENOUS EVERY 5 MIN PRN
Status: DISCONTINUED | OUTPATIENT
Start: 2024-05-20 | End: 2024-05-20 | Stop reason: HOSPADM

## 2024-05-20 RX ORDER — FAMOTIDINE 20 MG/1
20 TABLET, FILM COATED ORAL 2 TIMES DAILY
Status: DISCONTINUED | OUTPATIENT
Start: 2024-05-20 | End: 2024-05-21

## 2024-05-20 RX ORDER — HYDROMORPHONE HYDROCHLORIDE 1 MG/ML
0.5 INJECTION, SOLUTION INTRAMUSCULAR; INTRAVENOUS; SUBCUTANEOUS EVERY 5 MIN PRN
Status: DISCONTINUED | OUTPATIENT
Start: 2024-05-20 | End: 2024-05-20 | Stop reason: HOSPADM

## 2024-05-20 RX ORDER — ONDANSETRON 2 MG/ML
INJECTION INTRAMUSCULAR; INTRAVENOUS PRN
Status: DISCONTINUED | OUTPATIENT
Start: 2024-05-20 | End: 2024-05-20

## 2024-05-20 RX ORDER — PROPOFOL 10 MG/ML
INJECTION, EMULSION INTRAVENOUS CONTINUOUS PRN
Status: DISCONTINUED | OUTPATIENT
Start: 2024-05-20 | End: 2024-05-20

## 2024-05-20 RX ORDER — FENTANYL CITRATE 50 UG/ML
25 INJECTION, SOLUTION INTRAMUSCULAR; INTRAVENOUS EVERY 5 MIN PRN
Status: DISCONTINUED | OUTPATIENT
Start: 2024-05-20 | End: 2024-05-20 | Stop reason: HOSPADM

## 2024-05-20 RX ORDER — AMOXICILLIN 250 MG
1 CAPSULE ORAL 2 TIMES DAILY
Status: DISCONTINUED | OUTPATIENT
Start: 2024-05-20 | End: 2024-05-22 | Stop reason: HOSPADM

## 2024-05-20 RX ORDER — NALOXONE HYDROCHLORIDE 0.4 MG/ML
0.1 INJECTION, SOLUTION INTRAMUSCULAR; INTRAVENOUS; SUBCUTANEOUS
Status: DISCONTINUED | OUTPATIENT
Start: 2024-05-20 | End: 2024-05-20 | Stop reason: HOSPADM

## 2024-05-20 RX ORDER — MEPERIDINE HYDROCHLORIDE 25 MG/ML
INJECTION INTRAMUSCULAR; INTRAVENOUS; SUBCUTANEOUS
Status: DISPENSED
Start: 2024-05-20 | End: 2024-05-21

## 2024-05-20 RX ORDER — ONDANSETRON 4 MG/1
4 TABLET, ORALLY DISINTEGRATING ORAL EVERY 30 MIN PRN
Status: DISCONTINUED | OUTPATIENT
Start: 2024-05-20 | End: 2024-05-20 | Stop reason: HOSPADM

## 2024-05-20 RX ORDER — HYDROXYZINE HYDROCHLORIDE 10 MG/1
10 TABLET, FILM COATED ORAL EVERY 6 HOURS PRN
Status: DISCONTINUED | OUTPATIENT
Start: 2024-05-20 | End: 2024-05-21

## 2024-05-20 RX ORDER — ACETAMINOPHEN 325 MG/1
650 TABLET ORAL EVERY 4 HOURS PRN
Status: DISCONTINUED | OUTPATIENT
Start: 2024-05-23 | End: 2024-05-22 | Stop reason: HOSPADM

## 2024-05-20 RX ORDER — BISACODYL 10 MG
10 SUPPOSITORY, RECTAL RECTAL DAILY PRN
Status: DISCONTINUED | OUTPATIENT
Start: 2024-05-20 | End: 2024-05-22 | Stop reason: HOSPADM

## 2024-05-20 RX ORDER — PROCHLORPERAZINE MALEATE 5 MG
5 TABLET ORAL EVERY 6 HOURS PRN
Status: DISCONTINUED | OUTPATIENT
Start: 2024-05-20 | End: 2024-05-22 | Stop reason: HOSPADM

## 2024-05-20 RX ORDER — ALBUTEROL SULFATE 0.83 MG/ML
2.5 SOLUTION RESPIRATORY (INHALATION) EVERY 4 HOURS PRN
Status: DISCONTINUED | OUTPATIENT
Start: 2024-05-20 | End: 2024-05-20 | Stop reason: HOSPADM

## 2024-05-20 RX ORDER — ONDANSETRON 2 MG/ML
4 INJECTION INTRAMUSCULAR; INTRAVENOUS EVERY 30 MIN PRN
Status: DISCONTINUED | OUTPATIENT
Start: 2024-05-20 | End: 2024-05-20 | Stop reason: HOSPADM

## 2024-05-20 RX ORDER — BUPIVACAINE HYDROCHLORIDE AND EPINEPHRINE 5; 5 MG/ML; UG/ML
INJECTION, SOLUTION PERINEURAL PRN
Status: DISCONTINUED | OUTPATIENT
Start: 2024-05-20 | End: 2024-05-20

## 2024-05-20 RX ADMIN — PHENYLEPHRINE HYDROCHLORIDE 100 MCG: 10 INJECTION INTRAVENOUS at 18:19

## 2024-05-20 RX ADMIN — ROCURONIUM BROMIDE 20 MG: 50 INJECTION, SOLUTION INTRAVENOUS at 18:21

## 2024-05-20 RX ADMIN — PHENYLEPHRINE HYDROCHLORIDE 100 MCG: 10 INJECTION INTRAVENOUS at 18:10

## 2024-05-20 RX ADMIN — DEXAMETHASONE SODIUM PHOSPHATE 5 MG: 10 INJECTION, SOLUTION INTRAMUSCULAR; INTRAVENOUS at 21:45

## 2024-05-20 RX ADMIN — TIZANIDINE 4 MG: 2 TABLET ORAL at 05:34

## 2024-05-20 RX ADMIN — HYDROMORPHONE HYDROCHLORIDE 0.5 MG: 1 INJECTION, SOLUTION INTRAMUSCULAR; INTRAVENOUS; SUBCUTANEOUS at 21:32

## 2024-05-20 RX ADMIN — FENTANYL CITRATE 50 MCG: 50 INJECTION INTRAMUSCULAR; INTRAVENOUS at 17:27

## 2024-05-20 RX ADMIN — HYDROMORPHONE HYDROCHLORIDE 0.5 MG: 1 INJECTION, SOLUTION INTRAMUSCULAR; INTRAVENOUS; SUBCUTANEOUS at 21:10

## 2024-05-20 RX ADMIN — PHENYLEPHRINE HYDROCHLORIDE 100 MCG: 10 INJECTION INTRAVENOUS at 19:54

## 2024-05-20 RX ADMIN — FENTANYL CITRATE 50 MCG: 50 INJECTION INTRAMUSCULAR; INTRAVENOUS at 21:50

## 2024-05-20 RX ADMIN — SODIUM CHLORIDE: 9 INJECTION, SOLUTION INTRAVENOUS at 14:06

## 2024-05-20 RX ADMIN — MIDAZOLAM 2 MG: 1 INJECTION INTRAMUSCULAR; INTRAVENOUS at 17:27

## 2024-05-20 RX ADMIN — FENTANYL CITRATE 50 MCG: 50 INJECTION INTRAMUSCULAR; INTRAVENOUS at 17:38

## 2024-05-20 RX ADMIN — BUPIVACAINE HYDROCHLORIDE AND EPINEPHRINE BITARTRATE 20 ML: 5; .005 INJECTION, SOLUTION PERINEURAL at 21:45

## 2024-05-20 RX ADMIN — HYDROMORPHONE HYDROCHLORIDE 1 MG: 1 INJECTION, SOLUTION INTRAMUSCULAR; INTRAVENOUS; SUBCUTANEOUS at 15:39

## 2024-05-20 RX ADMIN — ROCURONIUM BROMIDE 20 MG: 50 INJECTION, SOLUTION INTRAVENOUS at 20:18

## 2024-05-20 RX ADMIN — MEPERIDINE HYDROCHLORIDE 12.5 MG: 25 INJECTION INTRAMUSCULAR; INTRAVENOUS; SUBCUTANEOUS at 22:30

## 2024-05-20 RX ADMIN — PHENYLEPHRINE HYDROCHLORIDE 100 MCG: 10 INJECTION INTRAVENOUS at 20:13

## 2024-05-20 RX ADMIN — PHENYLEPHRINE HYDROCHLORIDE 100 MCG: 10 INJECTION INTRAVENOUS at 18:50

## 2024-05-20 RX ADMIN — LEVOTHYROXINE SODIUM 25 MCG: 25 TABLET ORAL at 09:22

## 2024-05-20 RX ADMIN — PANTOPRAZOLE SODIUM 40 MG: 40 TABLET, DELAYED RELEASE ORAL at 09:23

## 2024-05-20 RX ADMIN — METOPROLOL SUCCINATE 150 MG: 100 TABLET, EXTENDED RELEASE ORAL at 09:24

## 2024-05-20 RX ADMIN — ROCURONIUM BROMIDE 20 MG: 50 INJECTION, SOLUTION INTRAVENOUS at 19:46

## 2024-05-20 RX ADMIN — HYDROMORPHONE HYDROCHLORIDE 1 MG: 1 INJECTION, SOLUTION INTRAMUSCULAR; INTRAVENOUS; SUBCUTANEOUS at 03:40

## 2024-05-20 RX ADMIN — PROPOFOL 150 MG: 10 INJECTION, EMULSION INTRAVENOUS at 17:38

## 2024-05-20 RX ADMIN — SODIUM CHLORIDE: 9 INJECTION, SOLUTION INTRAVENOUS at 17:32

## 2024-05-20 RX ADMIN — ROCURONIUM BROMIDE 50 MG: 50 INJECTION, SOLUTION INTRAVENOUS at 17:38

## 2024-05-20 RX ADMIN — HYDROMORPHONE HYDROCHLORIDE 1 MG: 1 INJECTION, SOLUTION INTRAMUSCULAR; INTRAVENOUS; SUBCUTANEOUS at 00:31

## 2024-05-20 RX ADMIN — HYDROMORPHONE HYDROCHLORIDE 1 MG: 1 INJECTION, SOLUTION INTRAMUSCULAR; INTRAVENOUS; SUBCUTANEOUS at 10:01

## 2024-05-20 RX ADMIN — HYDROMORPHONE HYDROCHLORIDE 0.5 MG: 1 INJECTION, SOLUTION INTRAMUSCULAR; INTRAVENOUS; SUBCUTANEOUS at 17:57

## 2024-05-20 RX ADMIN — MEPERIDINE HYDROCHLORIDE 12.5 MG: 25 INJECTION INTRAMUSCULAR; INTRAVENOUS; SUBCUTANEOUS at 22:12

## 2024-05-20 RX ADMIN — HYDROMORPHONE HYDROCHLORIDE 1 MG: 1 INJECTION, SOLUTION INTRAMUSCULAR; INTRAVENOUS; SUBCUTANEOUS at 12:05

## 2024-05-20 RX ADMIN — ATORVASTATIN CALCIUM 10 MG: 10 TABLET, FILM COATED ORAL at 09:22

## 2024-05-20 RX ADMIN — FENTANYL CITRATE 50 MCG: 50 INJECTION INTRAMUSCULAR; INTRAVENOUS at 21:49

## 2024-05-20 RX ADMIN — PHENYLEPHRINE HYDROCHLORIDE 100 MCG: 10 INJECTION INTRAVENOUS at 18:33

## 2024-05-20 RX ADMIN — TIZANIDINE 4 MG: 2 TABLET ORAL at 13:07

## 2024-05-20 RX ADMIN — PHENYLEPHRINE HYDROCHLORIDE 100 MCG: 10 INJECTION INTRAVENOUS at 18:58

## 2024-05-20 RX ADMIN — HYDROMORPHONE HYDROCHLORIDE 1 MG: 1 INJECTION, SOLUTION INTRAMUSCULAR; INTRAVENOUS; SUBCUTANEOUS at 05:36

## 2024-05-20 RX ADMIN — PHENYLEPHRINE HYDROCHLORIDE 100 MCG: 10 INJECTION INTRAVENOUS at 19:18

## 2024-05-20 RX ADMIN — ROCURONIUM BROMIDE 10 MG: 50 INJECTION, SOLUTION INTRAVENOUS at 19:04

## 2024-05-20 RX ADMIN — ONDANSETRON 4 MG: 2 INJECTION INTRAMUSCULAR; INTRAVENOUS at 21:04

## 2024-05-20 RX ADMIN — SODIUM CHLORIDE: 9 INJECTION, SOLUTION INTRAVENOUS at 09:28

## 2024-05-20 RX ADMIN — HYDROMORPHONE HYDROCHLORIDE 1 MG: 1 INJECTION, SOLUTION INTRAMUSCULAR; INTRAVENOUS; SUBCUTANEOUS at 07:50

## 2024-05-20 RX ADMIN — PROPOFOL 100 MCG/KG/MIN: 10 INJECTION, EMULSION INTRAVENOUS at 17:42

## 2024-05-20 RX ADMIN — SUGAMMADEX 200 MG: 100 INJECTION, SOLUTION INTRAVENOUS at 21:04

## 2024-05-20 RX ADMIN — LIDOCAINE HYDROCHLORIDE 50 MG: 20 INJECTION, SOLUTION INFILTRATION; PERINEURAL at 17:38

## 2024-05-20 RX ADMIN — Medication 2 G: at 17:36

## 2024-05-20 RX ADMIN — SODIUM CHLORIDE: 9 INJECTION, SOLUTION INTRAVENOUS at 19:17

## 2024-05-20 ASSESSMENT — ACTIVITIES OF DAILY LIVING (ADL)
ADLS_ACUITY_SCORE: 24
ADLS_ACUITY_SCORE: 23
ADLS_ACUITY_SCORE: 24

## 2024-05-20 ASSESSMENT — LIFESTYLE VARIABLES: TOBACCO_USE: 1

## 2024-05-20 NOTE — PROGRESS NOTES
Update    Dr. Wagner orthopedic surgeon updated re: platelets of 55. Dr. Wagner is requesting transfusion of platelets prior to surgery.  Patient thinks he have had a blood transfusion in the past, but it was a long time ago.  Does not recall having any adverse reactions to blood products.    Reviewed blood product consent with patient. He gave consent for platelet transfusion    Reviewed with Dr. Damon, blood bank    Plan:  -1U platelets to be transfused prior to surgery, requested to have 1 U platelets available in reserve if bleeding complications arise. This has been ordered  -type and cross ordered    Arina Vale CNP on 5/20/2024 at 2:33 PM

## 2024-05-20 NOTE — PLAN OF CARE
Goal Outcome Evaluation:      Plan of Care Reviewed With: patient    Overall Patient Progress: improvingOverall Patient Progress: improving     A/O. VSS. Does require 2L/min oxygen while sleeping to maintain SpO2>90%. Muscle spasms and moderate to severe left hip pain treated with PRN medications per MAR, repositioning, ice, and diversion activities. Patient reports improvement with pain but not relief. Sent for surgery around 1730. Did have friend at bedside visiting for part of shift.

## 2024-05-20 NOTE — PROGRESS NOTES
S-(situation): Patient arrives to room 266 via cart from ED    B-(background): left hip fx    A-(assessment): vss, increased pain during transfer    R-(recommendations): Orders reviewed with patient. Will monitor patient per MD orders.     Inpatient nursing criteria listed below were met:    Health care directives status obtained and documented: Yes  VTE ordered/documented: Yes  Skin issues/needs documented:Yes  Isolation addressed and Signage used: NA  Fall Prevention: Care plan updated NA Education given and documented NA  Care Plan initiated and Co-Morbidities added: Yes  Education Assessment documented:Yes  Admission Education Documented: Yes  If present CAUTI/CLABI Education done: Yes  New medication patient education completed and documented (Possible Side Effects of Common Medications handout): Yes  Allergies Reviewed: Yes  Admission Medication Reconciliation completed: Yes  Home medications if not able to send immediately home with family stored here: NA  Reminder note placed in discharge instructions regarding home meds: NA  Individualized care needs/preferences addressed and charted: Yes  Provider Notified that patient has arrived to the unit: Yes

## 2024-05-20 NOTE — TELEPHONE ENCOUNTER
"Patient was scheduled for an appointment with Dr. Fernandez in [place] on 5/17/24. Prior to this appointment, writer received a request from the [facility] Manager to meet with patient when he came for his appointment to discuss and complete a Partnership in Care Agreement due to inappropriate behaviors displayed at the [facility]. Upon entering the exam room with Dr. Fernandez to meet with the patient, writer introduced self and began the conversation about the Partnership in Care Agreement. Patient appeared very upset about the agreement and stated that we hadn't called him back and had the nerve to send him a letter saying we had tried to reach him. He stated that the \"girl\" was rude to him and if we didn't want him to use certain words, we should have signs saying what words are and aren't allowed posted on the door. Writer attempted to refocus on the agreement, explaining that it's not a one-sided agreement, it's our commitment to him as well as his commitment to the care team. He was very upset, was speaking in a raised voice, informed writer that \"there's a DSM diagnosis for people who are smiling all the time\". Patient was swinging his cane around and pointing at writer with the cane. He said he only wanted to talk with Dr. Fernandez and refused to sit back down in his chair with writer in the room. Dr. Fernandez encouraged patient to continue his hematology care as additional workup needed to be completed. Dr. Fernandez typed up her recommendations for patient and gave them to him per his request, he held on to them briefly and then said he didn't want them. Dr. Fernandez also explained to patient that the appointment could be continued if he would sit down and discuss the Partnership in Care Agreement, he refused to sit down, refused to sign the agreement and stated he would never see us again. Patient stated that he would file a former complaint against writejorje and the team he had interacted with, " patient representative number and information was provided. Patient left the visit and the clinic without completing his appointment.

## 2024-05-20 NOTE — PROGRESS NOTES
PRIMARY DIAGNOSIS: ACUTE PAIN  OUTPATIENT/OBSERVATION GOALS TO BE MET BEFORE DISCHARGE:  1. Pain Status: Improved but still requiring IV narcotics. Continuing with Dilaudid 1 mg q2hrs. Pain has come down to 4/10, patient was able to get some rest.     2. Return to near baseline physical activity:  N/A, bedrest    3. Cleared for discharge by consultants (if involved): No    A&Ox4. VSS on RA. CHG wipes complete. Microshifting throughout shift. NPO for surgery today.     Discharge Planner Nurse   Safe discharge environment identified: No  Barriers to discharge: Yes       Entered by: Olena Hough RN 05/20/2024 6:33 AM     Please review provider order for any additional goals.   Nurse to notify provider when observation goals have been met and patient is ready for discharge.

## 2024-05-20 NOTE — PROGRESS NOTES
Order received to transfuse 1U platelets. Type and screen completed by lab and resulted. Writer spoke with lab staff and we are waiting for platelets to be delivered to hospital. Lab staff will notify unit when platelets arrive and are ready to be transfused. Wen Paredes RN on 5/20/2024 at 3:06 PM

## 2024-05-20 NOTE — PROGRESS NOTES
Patient transferred to surgery with platelets infusing at rate of 120ml/hr. No signs of transfusion reaction present at this time. OR staff aware of infusion in process. Wen Paredes RN on 5/20/2024 at 5:34 PM

## 2024-05-20 NOTE — PROGRESS NOTES
Pt declined to use hospital's CPAP unit tonight. Stated he has not used one at home in many years.

## 2024-05-20 NOTE — ED NOTES
ED Nursing criteria listed below was addressed during verbal handoff:     Abnormal vitals: No  Abnormal results: Yes  Med Reconciliation completed: Yes  Meds given in ED: yes  Any Overdue Meds: No  Core Measures: Yes  Isolation: No  Special needs: Yes, skin risk, fall risk  Skin assessment: Yes    Observation Patient  Education provided: Yes

## 2024-05-20 NOTE — PROGRESS NOTES
PRIMARY DIAGNOSIS: ACUTE PAIN  OUTPATIENT/OBSERVATION GOALS TO BE MET BEFORE DISCHARGE:  1. Pain Status: Improved but still requiring IV narcotics.. Dilaudid 1 mg IV q2hr.   2. Return to near baseline physical activity: N/A, bedrest    3. Cleared for discharge by consultants (if involved): No      Sats mid 90s on 2L O2 oxymask. Dilaudid given x2 for pain. Dilaudid increased to 1 mg q2 hr d/t insufficient pain relief.  Pt restless, was able to get short amounts of sleep. . CXR complete. NPO for surgery tomorrow.     Discharge Planner Nurse   Safe discharge environment identified: Yes  Barriers to discharge: Yes       Entered by: Olena Hough RN 05/20/2024 2:25 AM     Please review provider order for any additional goals.   Nurse to notify provider when observation goals have been met and patient is ready for discharge.

## 2024-05-20 NOTE — OR NURSING
S-(situation): pre-op report  B-(background): see h&p. Left femur fx report passed along, that pt. Had fallen and was not able to get help for 2 hours.   Hx of chronic low platelets, surgeon aware, orders given for pack of platelets to be administered, hospital had to have sent from a different facility, platelets arrived here at hospital at time of this call. Med-surg to start infusion Anesthesia aware also of platelet level.  A-(assessment): report received from Wen GUAJARDO on med-surg  NPO: after midnight.  IV patent right antecubital.  R-(recommendations): to surgery via own bed, talked with OR nurse Gordon, and as delay d/t waiting for platelets pt. Will go directly to OR from med-surg. Kala GUAJARDO

## 2024-05-20 NOTE — PROGRESS NOTES
PRIMARY DIAGNOSIS: ACUTE PAIN  OUTPATIENT/OBSERVATION GOALS TO BE MET BEFORE DISCHARGE:  1. Pain Status: No improvement noted. Consider adjustment in pain regimen. Charge nurse/Provider notified    2. Return to near baseline physical activity: No    3. Cleared for discharge by consultants (if involved): No    EKG completed, Chest x-ray completed, NPO at midnight     Please review provider order for any additional goals.   Nurse to notify provider when observation goals have been met and patient is ready for discharge.

## 2024-05-20 NOTE — ANESTHESIA PROCEDURE NOTES
Airway       Patient location during procedure: OR       Procedure Start/Stop Times: 5/20/2024 5:41 PM  Staff -        Performed By: CRNA  Consent for Airway        Urgency: elective  Indications and Patient Condition       Indications for airway management: sunitha-procedural       Induction type:intravenous       Mask difficulty assessment: 1 - vent by mask    Final Airway Details       Final airway type: endotracheal airway       Successful airway: ETT - single  Endotracheal Airway Details        ETT size (mm): 7.5       Cuffed: yes       Successful intubation technique: video laryngoscopy       VL Blade Size: Glidescope 3       Grade View of Cords: 1       Adjucts: stylet       Position: Right       Measured from: lips       Secured at (cm): 22       Bite block used: Oral Airway    Post intubation assessment        Placement verified by: capnometry, equal breath sounds and chest rise        Number of attempts at approach: 1       Number of other approaches attempted: 0       Secured with: plastic tape       Ease of procedure: easy       Dentition: Intact and Unchanged    Medication(s) Administered   Medication Administration Time: 5/20/2024 5:41 PM

## 2024-05-20 NOTE — H&P
HOSPITALIST TELEMED ADMISSION H&P    Service Date : 5/19/2024  Dr. Lisette HORAN am located in [place]  Gucci Logan is located in [place] at [facility]  The RN or tech on duty in the ED is assisting me today with this patient.    chief complaint: Fall with left hip pain    History of Present Illness:  Gucci Logan is a 73 year old male,  with type 2 diabetes, hypertension, hyperlipidemia, CKD 3, Hep C+ and obstructive sleep apnea presenting to ED today  by EMS after he  sustained a ground-level fall in his yard.   Patient indicated that he was outside working in his yard breaking up pallets, when he tripped over one of the wood pallets.  The patient lives alone,and  said that he was  lying on the ground for about 3 hours until  a neighbor was able to come over and help him,   with calling EMS.   He was uncertain if he hit his head.  Denied any loss of consciousness, nausea, vomiting,   fever or chills while at home however in the ED he did have chills and a low grade fever.   He said that while he was lying on the ground he had some coughing with phlegm production.   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  He states that several years ago he was involved in an experimental study and that he was one of the only ones who completed the study, and that it was intense.  He has otherwise not been on any other medications for hepatitis C.    Emergency Room Course - Emergency Room Course -  in the emergency room, serologies for CMP, CBC,  CPK and a urinalysis were obtained.    EKG:   sinus rhythm with rate of 70, no acute ST-T wave changes were noted, Left axis deviation,  anterior fascicular block which was noted on the previous EKG February 2023.     Radiological diagnostics included:      EXAM: CT HEAD W/O CONTRAST  LOCATION: [facility]  DATE: 5/19/2024     INDICATION: Fall, unknown head trauma.  COMPARISON: CT 12/6/2021.  TECHNIQUE: Routine CT Head without IV contrast. Multiplanar reformats. Dose reduction techniques were used.     FINDINGS:  INTRACRANIAL CONTENTS: No intracranial hemorrhage, extraaxial collection, or mass effect.  No CT evidence of acute infarct. Mild presumed chronic small vessel ischemic changes. Mild generalized volume loss. No hydrocephalus.      VISUALIZED ORBITS/SINUSES/MASTOIDS: No intraorbital abnormality. No paranasal sinus mucosal disease. No middle ear or mastoid effusion.     BONES/SOFT TISSUES: No acute abnormality.                                                                      IMPRESSION:  1.  No CT evidence for acute intracranial process.  2.  Mild chronic microvascular ischemic changes as above.     EXAM: XR PELVIS AND HIP LEFT 2 VIEWS  LOCATION: [facility]  DATE: 5/19/2024     INDICATION: Fall, hip pain, history of bursitis and recent injection  COMPARISON: None.                                                                    IMPRESSION: Fracture through the left femur at the intertrochanteric/subtrochanteric junction. No dislocation. Mild degenerative change both hip joints. Right hip negative for fracture. Pelvis negative for fracture.       EXAM: CT CERVICAL SPINE W/O CONTRAST  LOCATION: [facility]  DATE: 5/19/2024     INDICATION: Fall, possible head injury, no neck pain  COMPARISON: None.  TECHNIQUE: Routine CT Cervical Spine without IV contrast. Multiplanar reformats. Dose reduction techniques were used.     FINDINGS:  VERTEBRA: Normal vertebral body heights and alignment. No fracture or posttraumatic subluxation.      CANAL/FORAMINA: C3-4, moderate right foraminal stenosis. At C5-6, severe central and severe bilateral foraminal stenosis. At C6-7, severe central and severe bilateral foraminal stenosis.     PARASPINAL: No  extraspinal abnormality.                                                                      IMPRESSION:  1.  No fracture or posttraumatic subluxation.  2.  High-grade central and bilateral foraminal stenosis at C5-6 and C6-7..       EXAM: XR CHEST PORT 1 VIEW  LOCATION: [facility]  DATE: 5/19/2024     INDICATION: cough  COMPARISON: 9/12/2023.     FINDINGS: The heart size is normal. The thoracic aorta is calcified and tortuous. Mild scarring at the left lung base. The lungs are otherwise clear. Curvilinear lucency at the right hemidiaphragm is likely artifactual. There is no pneumothorax.   Degenerative disease in the spine. Spinal rods.                                                                      IMPRESSION: No acute abnormality.             Past Medical History  Past Medical History:   Diagnosis Date    Arthritis     Chronic, continuous use of opioids     Esophageal reflux 03/01/2014    Hearing problem     Hiatal hernia     Hypertension     Jaundice 05/31/2008    Liver disease     Obesity 01/24/2013    Obstructive sleep apnea     Sleep apnea     Advised CPAP machine. Not keen to use it.     Syncope, unspecified syncope type 2/20/2023      Patient Active Problem List   Diagnosis    Hypertension goal BP (blood pressure) < 140/90    Obesity, unspecified obesity severity, unspecified obesity type    Gastroesophageal reflux disease, esophagitis presence not specified    Hyperlipidemia LDL goal <100    Impaired fasting glucose    Diabetes mellitus, type 2 (H)    Non-insulin dependent type 2 diabetes mellitus (H)    Abdominal pain    Portal vein thrombosis    Vitamin D deficiency    Thrombocytopenia (H24)    Splenomegaly    Osteoarthrosis    SHONDA (obstructive sleep apnea)    Lymphadenopathy    Left ventricular hypertrophy    Jaundice    Chronic hepatitis C virus infection (H)    Compression fracture of T6 vertebra with routine healing    Syncope, unspecified syncope type    Fall     Compression fracture of T5 vertebra with routine healing, subsequent encounter    Diabetes mellitus without complication (H)    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)    Stage 3 chronic kidney disease, unspecified whether stage 3a or 3b CKD (H)    Glaucoma suspect of both eyes    Fall from standing, initial encounter    Intertrochanteric fracture of left femur, closed, initial encounter (H)         Past Surgical History  Past Surgical History:   Procedure Laterality Date    ARTHROSCOPY KNEE RT/LT      (L) with partial medial meniscectomy    CATARACT IOL, RT/LT  Nov and Dec 2017    COLONOSCOPY N/A 4/24/2019    Procedure: COLONOSCOPY, WITH POLYPECTOMY AND BIOPSY;  Surgeon: Leventhal, Thomas Michael, MD;  Location: [facility] OR    COLONOSCOPY N/A 9/14/2022    Procedure: COLONOSCOPY;  Surgeon: Rafa Renee MD;  Location:  GI    DACRYOCYSTORHINOSTOMY Left 10/16/2018    Procedure: DACRYOCYSTORHINOSTOMY;  Surgeon: Madhu Krause MD;  Location: [facility]    ENDOSCOPIC ENDONASAL SURGERY  1994    ENDOSCOPY  2-19-15    ESOPHAGOSCOPY, GASTROSCOPY, DUODENOSCOPY (EGD), COMBINED N/A 4/24/2019    Procedure: COMBINED ESOPHAGOSCOPY, GASTROSCOPY, DUODENOSCOPY (EGD) - hold aspirin ibuprofen or naproxen for one week prior (per physician order);  Surgeon: Leventhal, Thomas Michael, MD;  Location: [facility] OR    ESOPHAGOSCOPY, GASTROSCOPY, DUODENOSCOPY (EGD), COMBINED N/A 5/23/2023    Procedure: Esophagoscopy, gastroscopy, duodenoscopy, combined;  Surgeon: Rafa Renee MD;  Location:  GI    EYE SURGERY      Hernia surgery Left 1994    NASAL/SINUS POLYPECTOMY      REPAIR PTOSIS Left 10/16/2018    Procedure: LEFT UPPER LID PTOSIS AND BILATERAL  BROW PTOSIS REPAIR WITH LEFT DACRYOCYSTORHINOSTOMY ;  Surgeon: Madhu Krause MD;  Location: [facility]    REPAIR PTOSIS BROW Bilateral 10/16/2018    Procedure: REPAIR PTOSIS BROW;  Surgeon: Madhu Krause MD;  Location:  [facility]    ROTATOR CUFF REPAIR RT/LT Right     ZZC OPEN RX ANKLE DISLOCATN+FIXATN      (R)    ZZC SPINAL FUSION,ANT,EA ADNL LEVEL      T12 - L1      Social History  Gucci  reports that he quit smoking about 25 years ago. His smoking use included cigarettes. He started smoking about 34 years ago. He has never been exposed to tobacco smoke. He has never used smokeless tobacco. He reports current alcohol use. He reports that he does not use drugs.    Family History  Gcuci's family history includes Alzheimer Disease (age of onset: 85) in his mother; Cancer in his father; Cerebrovascular Disease (age of onset: 50) in his father; Dementia in his mother; Hypertension in his father.    Home Medications  Current Outpatient Medications   Medication Instructions    atorvastatin (LIPITOR) 10 mg, Oral, DAILY    diclofenac (VOLTAREN) 4 g, Topical, 3 TIMES DAILY PRN    hydroCHLOROthiazide 12.5 mg, Oral, DAILY    levothyroxine (SYNTHROID/LEVOTHROID) 25 mcg, Oral, DAILY    metFORMIN (GLUCOPHAGE) 500 mg, Oral, 2 TIMES DAILY WITH MEALS    metoprolol succinate ER (TOPROL XL) 100 MG 24 hr tablet 1 1/2 ( 150 mg ) po daily    multivitamin w/minerals (THERA-VIT-M) tablet 1 tablet, Oral, DAILY    omeprazole (PRILOSEC) 20 MG DR capsule TAKE 1 CAPSULE BY MOUTH ONCE DAILY 30 TO 60 MINUTES BEFORE A MEAL    spironolactone (ALDACTONE) 25 mg, Oral, DAILY    tiZANidine (ZANAFLEX) 4 MG tablet TAKE 1/2 (ONE-HALF) TO 1  TABLET BY MOUTH UP TO THREE TIMES DAILY AS NEEDED    traMADol (ULTRAM) 50 mg, Oral, EVERY 6 HOURS PRN    valsartan (DIOVAN) 160 mg, Oral, DAILY       Allergies  Allergies   Allergen Reactions    Bee Venom Swelling     Gum swelling        10 pt ROS neg except as noted in HPI    Physical Exam:  I performed all aspects of the physical examination via Telemedicine associated with two way audio and video communication.    Vital Signs: Blood pressure 115/50, pulse 63, temperature 98  F (36.7  C), temperature source Temporal, resp. rate 13,  "SpO2 93%.    Physical Exam    Gen:  Well-developed, well-nourished, in  moderate distress secondary to hip pain lying semi-supine in his hospital bed.  HEENT:  Anicteric sclera, PER, hearing intact to voice  Resp:  No accessory muscle use, breath sounds clear; no wheezes no rales no rhonchi  Card:   normal S1, S2, murmur(?)  Abd:  Soft per RN exam, no TTP, non-distended, normoactive bowel sounds are present  Skin: Large ecchymotic bruising noted posterior aspect of left thigh.   Ext CRT> 2 sec, no Neurovascular compromise  Psych:  Oriented to person, place and time, not anxious, not agitated      DATA:    I&O: No intake/output data recorded.     Labs:  I have personally reviewed the following studies:  Recent Labs   Lab 05/19/24  1539   POTASSIUM 4.2   CHLORIDE 104   CO2 24   BUN 27.7*        Imaging: ER imaging reviewed      Misc  DVT prophylaxis:  held pending surgery in the morning, will resume  Lovenox  after surgery.  Code Status: Full code   Rankin:  none  Diet:  Diabetic       ASSESSMENT/PLAN:   73-year-old male, ground-level fall sustaining a left  intertrochanteric femur fracture, requiring surgical intervention.       Cardiac risk assessment using the Franco perioperative risk  calculated for myocardial infarction or cardiac arrest was 0.2% risk of myocardial infarction,  intraoperatively or cardiac arrest. Per ACC/KEI  guidelines this patient is identified as low risk and requires no further cardiovascular testing and may proceed to have the non-cardiac surgery.      Clinically Significant Risk Factors Present on Admission                  # Hypertension: Noted on problem list      # Obesity: Estimated body mass index is 34.47 kg/m  as calculated from the following:    Height as of 5/17/24: 1.803 m (5' 10.98\").    Weight as of 5/17/24: 112 kg (247 lb).         # Type II Diabetes: Monitor blood glucose levels daily, continue  metformin  # Hypertension:  165/73  Stable. Continue with daily vitals, continue  " hydrochlorothiazide, metoprolol succinate ER,  valsartan, spironolactone.  Monitor for any electrolyte imbalances.  # Hyperlipidemia: Stable. Continue medication management with  atorvastatin, monitor liver enzymes  # Hypothyroidism:    TSH   Date Value Ref Range Status   04/16/2024 5.38 (H) 0.30 - 4.20 uIU/mL Final   01/18/2023 4.14 (H) 0.40 - 4.00 mU/L Final   04/05/2021 4.02 (H) 0.40 - 4.00 mU/L Final    stable. Continue with levothyroxine     # GERD: Stable.  Continue with Protonix      This patient's current admission to an acute care setting is essential for the treatment of  left femur fracture. The patient's recovery is dependent on the following treatments of  surgical intervention to stabilize his condition. The patient is at risk of developing further complications of  if not treated in an acute care setting. I expect the patient's hospital stay to require 2 - 4 days.     Our patient will be admitted under the hospitalist services and further management to be based on patient's clinical progress.        As the provider for the telehealth service, I attest that I introduced myself to the patient and provided my credentials. Based on review of the patient s chart and/or a discussion with members of the patient s treatment team, I determined that telemedicine via a real-time, two-way, interactive audio and video platform is an appropriate means of providing this service. The patient and I mutually agreed that this visit is appropriate for telemedicine as well.    The encounter was approximately 30minutes. The nurse was present for the duration of the encounter.    Chart reviewed,labs and available imaging reviewed,consultant notes reviewed. Previous available medical records have been reviewed  Care plan discussed with care team    I have seen and examined the patient today. Documentation for this visit was completed using a template. Everything documented was personally performed today with the necessary  additions, deletions and changes made as appropriate with the diagnoses being listed in no particular order of clinical relevance.    Lisette Huerta MD, FACP  Securely message with the [employer] Web Console (learn more here)  Text page via [facility] Paging/Directory

## 2024-05-20 NOTE — ANESTHESIA PREPROCEDURE EVALUATION
Anesthesia Pre-Procedure Evaluation    Patient: Gucci Logan   MRN: [id number] : 1950        Procedure : Procedure(s):  closed reduction internal fixation hip nailing          Past Medical History:   Diagnosis Date    Arthritis     Chronic, continuous use of opioids     Esophageal reflux 2014    Hearing problem     Hiatal hernia     Hypertension     Jaundice 2008    Liver disease     Obesity 2013    Obstructive sleep apnea     Sleep apnea     Advised CPAP machine. Not keen to use it.     Syncope, unspecified syncope type 2023      Past Surgical History:   Procedure Laterality Date    ARTHROSCOPY KNEE RT/LT      (L) with partial medial meniscectomy    CATARACT IOL, RT/LT  Nov and Dec 2017    COLONOSCOPY N/A 2019    Procedure: COLONOSCOPY, WITH POLYPECTOMY AND BIOPSY;  Surgeon: Leventhal, Thomas Michael, MD;  Location: [facility] OR    COLONOSCOPY N/A 2022    Procedure: COLONOSCOPY;  Surgeon: Rafa Renee MD;  Location:  GI    DACRYOCYSTORHINOSTOMY Left 10/16/2018    Procedure: DACRYOCYSTORHINOSTOMY;  Surgeon: Madhu Krause MD;  Location: [facility]    ENDOSCOPIC ENDONASAL SURGERY      ENDOSCOPY  2-19-15    ESOPHAGOSCOPY, GASTROSCOPY, DUODENOSCOPY (EGD), COMBINED N/A 2019    Procedure: COMBINED ESOPHAGOSCOPY, GASTROSCOPY, DUODENOSCOPY (EGD) - hold aspirin ibuprofen or naproxen for one week prior (per physician order);  Surgeon: Leventhal, Thomas Michael, MD;  Location:  OR    ESOPHAGOSCOPY, GASTROSCOPY, DUODENOSCOPY (EGD), COMBINED N/A 2023    Procedure: Esophagoscopy, gastroscopy, duodenoscopy, combined;  Surgeon: Rafa Renee MD;  Location:  GI    EYE SURGERY      Hernia surgery Left     NASAL/SINUS POLYPECTOMY      REPAIR PTOSIS Left 10/16/2018    Procedure: LEFT UPPER LID PTOSIS AND BILATERAL  BROW PTOSIS REPAIR WITH LEFT DACRYOCYSTORHINOSTOMY ;  Surgeon: Madhu Krause MD;  Location: [facility]    REPAIR PTOSIS BROW Bilateral 10/16/2018     Procedure: REPAIR PTOSIS BROW;  Surgeon: Madhu Krause MD;  Location: [facility]    ROTATOR CUFF REPAIR RT/LT Right     ZZC OPEN RX ANKLE DISLOCATN+FIXATN      (R)    ZZC SPINAL FUSION,ANT,EA ADNL LEVEL      T12 - L1      Allergies   Allergen Reactions    Bee Venom Swelling     Gum swelling      Social History     Tobacco Use    Smoking status: Former     Current packs/day: 0.00     Types: Cigarettes     Start date: 1990     Quit date: 1999     Years since quittin.4     Passive exposure: Never    Smokeless tobacco: Never   Substance Use Topics    Alcohol use: Yes     Comment: rare      Wt Readings from Last 1 Encounters:   24 112 kg (247 lb)        Anesthesia Evaluation            ROS/MED HX  ENT/Pulmonary:     (+) sleep apnea,               tobacco use, Past use,                       Neurologic:  - neg neurologic ROS     Cardiovascular:     (+) Dyslipidemia hypertension- -   -  - -             fainting (syncope).                    Previous cardiac testing   Echo: Date:  Results:  Procedure  Bubble Echocardiogram with two-dimensional, color and spectral Doppler  performed.  ______________________________________________________________________________  Interpretation Summary  Global and regional left ventricular function is hyperkinetic with an EF >70%.  Basal septum 23mm. Mild EDGAR of anterior mitral leaflet. LVOT gradient not  assessed. Concern for hypertrophic cardiomyopathy. Consider cardiac MRI if  clinically indicated.  Right ventricular function, chamber size, wall motion, and thickness are  normal.  The atrial septum is intact as assessed by color Doppler and agitated saline  bubble study .  The inferior vena cava is normal.  No pericardial effusion is present.  ______________________________________________________________________________  Left Ventricle  Global and regional left ventricular function is hyperkinetic with an EF >70%.  Basal septum 23mm. Mild EDGAR of anterior  mitral leaflet. LVOT gradient not  assessed. Concern for hypertrophic cardiomyopathy. Consider cardiac MRI if  clinically indicated. Left ventricular size is normal. Grade II or moderate  diastolic dysfunction. No regional wall motion abnormalities are seen.     Right Ventricle  Right ventricular function, chamber size, wall motion, and thickness are  normal.     Atria  The right atria appears normal. Moderate left atrial enlargement is present.  The atrial septum is intact as assessed by color Doppler and agitated saline  bubble study .     Mitral Valve  Mild to moderate mitral annular calcification is present. Trace mitral  insufficiency is present.     Aortic Valve  Aortic valve sclerosis is present. Trace aortic insufficiency is present. The  mean AoV pressure gradient is 13.6 mmHg. The calculated aortic valve are is  3.9 cm^2.     Tricuspid Valve  The tricuspid valve is normal. Pulmonary artery systolic pressure cannot be  assessed.     Pulmonic Valve  The pulmonic valve is normal.     Vessels  The thoracic aorta is normal. The pulmonary artery and bifurcation cannot be  assessed. The inferior vena cava is normal.     Pericardium  No pericardial effusion is present.    Stress Test:  Date: Results:    ECG Reviewed:  Date: 5/19/24 Results:  SR  Cath:  Date: Results:      METS/Exercise Tolerance:     Hematologic: Comments: Thrombocytopenia       Musculoskeletal:   (+)  arthritis,   fracture, Fracture location: LLE,         GI/Hepatic: Comment: [place]    (+) GERD,          hepatitis type C, liver disease,       Renal/Genitourinary:     (+) renal disease, type: CRI,            Endo:     (+)  type II DM, Last HgA1c: 5.5, date: 4/9/2024, Not using insulin,          Obesity,       Psychiatric/Substance Use:  - neg psychiatric ROS     Infectious Disease:  - neg infectious disease ROS     Malignancy:  - neg malignancy ROS     Other:            Physical Exam    Airway  airway exam normal      Mallampati: II   TM distance: >  3 FB   Neck ROM: full   Mouth opening: > 3 cm    Respiratory Devices and Support         Dental           Cardiovascular   cardiovascular exam normal       Rhythm and rate: regular and normal     Pulmonary   pulmonary exam normal        breath sounds clear to auscultation           OUTSIDE LABS:  CBC:   Lab Results   Component Value Date    WBC 5.2 05/20/2024    WBC 3.4 (L) 04/16/2024    HGB 10.2 (L) 05/20/2024    HGB 10.9 (L) 04/16/2024    HCT 31.6 (L) 05/20/2024    HCT 33.1 (L) 04/16/2024    HCT 33.1 (L) 04/16/2024    PLT 55 (L) 05/20/2024    PLT 52 (L) 04/16/2024     BMP:   Lab Results   Component Value Date     05/20/2024     05/19/2024    POTASSIUM 4.0 05/20/2024    POTASSIUM 4.2 05/19/2024    CHLORIDE 109 (H) 05/20/2024    CHLORIDE 104 05/19/2024    CO2 23 05/20/2024    CO2 24 05/19/2024    BUN 27.8 (H) 05/20/2024    BUN 27.7 (H) 05/19/2024    CR 1.38 (H) 05/20/2024    CR 1.37 (H) 05/19/2024     (H) 05/20/2024     (H) 05/20/2024     COAGS:   Lab Results   Component Value Date    PTT 27 02/20/2023    INR 1.35 (H) 05/20/2024     POC:   Lab Results   Component Value Date     (H) 07/10/2020     HEPATIC:   Lab Results   Component Value Date    ALBUMIN 3.3 (L) 05/20/2024    PROTTOTAL 5.8 (L) 05/20/2024    ALT 35 05/20/2024    AST 48 (H) 05/20/2024    ALKPHOS 138 05/20/2024    BILITOTAL 2.0 (H) 05/20/2024    SHANI 35 07/21/2021     OTHER:   Lab Results   Component Value Date    LACT 1.4 07/08/2020    A1C 5.5 04/09/2024    SOLEDAD 8.4 (L) 05/20/2024    MAG 1.9 02/22/2023    LIPASE 137 07/21/2021    TSH 5.38 (H) 04/16/2024    T4 1.13 04/16/2024    T3 104 08/10/2018    CRP 7.9 08/15/2022    SED 25 (H) 04/16/2024       Anesthesia Plan    ASA Status:  3, emergent    NPO Status:  NPO Appropriate    Anesthesia Type: General (unable to do spinal due to Plt count 55, INR 1/35).     - Airway: ETT   Induction: Intravenous, Propofol.   Maintenance: Balanced.        Consents    Anesthesia Plan(s) and  "associated risks, benefits, and realistic alternatives discussed. Questions answered and patient/representative(s) expressed understanding.     - Discussed:     - Discussed with:  Patient            Postoperative Care    Pain management: IV analgesics, Oral pain medications, Peripheral nerve block (Single Shot).   PONV prophylaxis: Ondansetron (or other 5HT-3), Dexamethasone or Solumedrol, Background Propofol Infusion     Comments:    Other Comments: The risks and benefits of anesthesia, and the alternatives where applicable, have been discussed with the patient, and they wish to proceed.              Chandan Boggs, APRN CRNA    I have reviewed the pertinent notes and labs in the chart from the past 30 days and (re)examined the patient.  Any updates or changes from those notes are reflected in this note.       # Hypocalcemia: Lowest Ca = 8.4 mg/dL in last 2 days, will monitor and replace as appropriate    # Hypoalbuminemia: Lowest albumin = 3.3 g/dL at 5/20/2024  6:14 AM, will monitor as appropriate   # Coagulation Defect: INR = 1.35 (Ref range: 0.85 - 1.15) and/or PTT = N/A, will monitor for bleeding  # Thrombocytopenia: Lowest platelets = 55 in last 2 days, will monitor for bleeding  # Obesity: Estimated body mass index is 34.47 kg/m  as calculated from the following:    Height as of 5/17/24: 1.803 m (5' 10.98\").    Weight as of 5/17/24: 112 kg (247 lb).      "

## 2024-05-20 NOTE — PROGRESS NOTES
[facility]    Medicine Progress Note - Hospitalist Service    Date of Admission:  5/19/2024    Assessment & Plan   Active Problems:  Fall from standing, initial encounter   Intertrochanteric fracture of left femur, closed, initial encounter (H)    Assessment: sustained a ground-level fall in his yard.   Patient indicated that he was outside working in his yard breaking up pallets, when he tripped over one of the wood pallets.  The patient lives alone,and  said that he was  lying on the ground for about 3 hours until  a neighbor was able to come over and help him,   with calling EMS.   He was uncertain if he hit his head,  head CT shows not acute changes.  Denied any loss of consciousness.  X-ray of pelvis and left hip showed fracture through the femur at the intertrochanteric/subtrochanteric junction.  Orthopedics consulted in the ED and surgery is planned today    Plan:   -Orthopedic surgery consult  -N.p.o. after midnight  -Lovenox was held starting this morning  -Per admitting provider Cardiac risk assessment using the Franco perioperative risk  calculated for myocardial infarction or cardiac arrest was 0.2% risk of myocardial infarction,  intraoperatively or cardiac arrest. Per ACC/[facility]I  guidelines this patient is identified as low risk and requires no further cardiovascular testing and may proceed to have the non-cardiac surgery.       Elevated CK  On admission CK elevated at 372.  Patient found down after being on the ground for several hours.  -IV fluids  -Repeat CK in the morning     Anemia - appears chronic  Chronic Thrombocytopenia  Followed by Dr. Fernandez heme/onc, was supposed to be seen, but didn't finishe visit on 5/17.    Hemoglobin and platelets stable since April 2024.  Dr. Fernandez recommending further evaluation with a bone marrow biopsy and further lab work.  This morning platelets 55, hemoglobin 10.2.  Patient denies history of  bleeding problems  -Dr. Wagner, surgeon  updated regarding chronic thrombocytopenia  -Follow-up with heme-onc as outpatient    Hypertrophic cardiomyopathy  Echocardiogram 12/2023 showing global and regional left ventricular function is hyperkinetic with an EF of greater than 70%.  Basal septum 23 mm.  Mild EDGRA of anterior mitral leaflet.  LVOT gradient not assessed.  Concern for hypertrophic cardiomyopathy.  Right ventricular function size wall motion and thickness were normal.  Patient had an abnormal stress MRI 12/14/2023 which showed asymmetric septal hypertrophy.  Patient also had Zio patch which showed no sustained arrhythmias to account for symptoms of dyspnea on exertion.  Symptoms included dyspnea on exertion.  1/08 patient saw Dr. Killian, cardiology [facility].  1/19 had an elective coronary angiogram which ruled out epicardial disease and LVOT tract obstruction.  He then had no further follow-up with cardiology  -Continue metoprolol  -Follow-up with cardiology    DM type 2  Hemoglobin A1c 5.5.  Prior to admission metformin  -Hold metformin in the perioperative time  -Monitor blood glucose  -Consider restarting once patient routinely taking oral    HTN  Home medications include hydrochlorothiazide, metoprolol, spironolactone, valsartan.  Patient has been normotensive  -Holding hydrochlorothiazide, spironolactone, valsartan in the perioperative time  -Continue metoprolol    CKD stage 3  Baseline creatinine 1.2-1.3.  On admission creatinine was 1.37.  Recheck this morning is 1.38.  Patient did receive 2 L of normal saline in the ED.  -Continue normal saline IV fluids at 100 cc/h  -Monitor basic metabolic panel daily    Chronic hepatitis C  Liver cirrhosis secondary to NAFLD  Portal HTN  Chronic thrombus of SMV and portal vein  Splenomegaly  Elevated INR  Diagnosed with cirrhosis 5/2008 via liver biopsy, hepatitis C diagnosed in 2004.  Per EGD 5/23 showed mild portal hypertension gastropathy and gastric cardia gastric fundus and gastric  "body.  Last seen by Dr. Dexter BETANCUR April 2023 and liver disease was compensated at that time.    [place] INR elevated at 1.35.  Alk phos 138, ALT 35, AST 48, direct bilirubin elevated at 0.39, total bilirubin elevated at 2.0  -Dr. Wagner surgeon updated on INR  -Monitor liver function daily  -follow up with GI as outpatient    Obstructive sleep apnea  Patient reports he does have obstructive sleep apnea but has never used CPAP and never will    Hypothyroidism  4/16 TSH 5.38, free T41.13  -Continue home dose of levothyroxine    Former smoker  Patient reports only smoked for about 5 years and quit many many years ago.          Diet: NPO per Anesthesia Guidelines for Procedure/Surgery Except for: Meds    DVT Prophylaxis: hold prior to surgery  Rankin Catheter: Not present  Lines: None     Cardiac Monitoring: None  Code Status: Full Code      Clinically Significant Risk Factors Present on Admission                # Thrombocytopenia: Lowest platelets = 55 in last 2 days, will monitor for bleeding   # Hypertension: Noted on problem list      # Obesity: Estimated body mass index is 34.47 kg/m  as calculated from the following:    Height as of 5/17/24: 1.803 m (5' 10.98\").    Weight as of 5/17/24: 112 kg (247 lb).              Disposition Plan     Medically Ready for Discharge: Anticipated in 2-4 Days           The patient's care was discussed with the Patient and orthopedic surgery Team.    Arina Vale CNP  Hospitalist Service  [facility]  Securely message with [employer] (more info)  Text page via [employer] Paging/Directory   ______________________________________________________________________    Interval History   No acute changes overnight.  Pain managed with IV hydromorphone.  He has been NPO after midnight    Physical Exam   Vital Signs: Temp: 98.1  F (36.7  C) Temp src: Oral BP: 114/44 Pulse: 60   Resp: 16 SpO2: 100 % O2 Device: Oxymask Oxygen Delivery: 2 LPM  Weight: 0 lbs 0 oz    General " Appearance: Lying in bed, alert and oriented.  No acute distress  Respiratory: Respiratory effort easy at rest, lung sounds diminished in bilateral bases but clear  Cardiovascular: Regular rate and rhythm  GI: Abdomen soft and nontender with active bowel sounds  Skin: Grossly intact        Medical Decision Making       45 MINUTES SPENT BY ME on the date of service doing chart review, history, exam, documentation & further activities per the note.      Data     I have personally reviewed the following data over the past 24 hrs:    5.2  \   10.2 (L)   / 55 (L)     140 109 (H) 27.8 (H) /  126 (H)   4.0 23 1.38 (H) \     ALT: 35 AST: 48 (H) AP: 138 TBILI: 2.0 (H)   ALB: 3.3 (L) TOT PROTEIN: 5.8 (L) LIPASE: N/A     INR:  1.35 (H) PTT:  N/A   D-dimer:  N/A Fibrinogen:  N/A       Imaging results reviewed over the past 24 hrs:   Recent Results (from the past 24 hour(s))   XR Pelvis w Hip Left G/E 2 Views    Narrative    EXAM: XR PELVIS AND HIP LEFT 2 VIEWS  LOCATION: [facility] [place]  DATE: 5/19/2024    INDICATION: Fall, hip pain, history of bursitis and recent injection  COMPARISON: None.      Impression    IMPRESSION: Fracture through the left femur at the intertrochanteric/subtrochanteric junction. No dislocation. Mild degenerative change both hip joints. Right hip negative for fracture. Pelvis negative for fracture.   CT Head w/o Contrast    Narrative    EXAM: CT HEAD W/O CONTRAST  LOCATION: [facility] [place]  DATE: 5/19/2024    INDICATION: Fall, unknown head trauma.  COMPARISON: CT 12/6/2021.  TECHNIQUE: Routine CT Head without IV contrast. Multiplanar reformats. Dose reduction techniques were used.    FINDINGS:  INTRACRANIAL CONTENTS: No intracranial hemorrhage, extraaxial collection, or mass effect.  No CT evidence of acute infarct. Mild presumed chronic small vessel ischemic changes. Mild generalized volume loss. No hydrocephalus.     VISUALIZED  ORBITS/SINUSES/MASTOIDS: No intraorbital abnormality. No paranasal sinus mucosal disease. No middle ear or mastoid effusion.    BONES/SOFT TISSUES: No acute abnormality.      Impression    IMPRESSION:  1.  No CT evidence for acute intracranial process.  2.  Mild chronic microvascular ischemic changes as above.   CT Cervical Spine w/o Contrast    Narrative    EXAM: CT CERVICAL SPINE W/O CONTRAST  LOCATION: [facility]  DATE: 5/19/2024    INDICATION: Fall, possible head injury, no neck pain  COMPARISON: None.  TECHNIQUE: Routine CT Cervical Spine without IV contrast. Multiplanar reformats. Dose reduction techniques were used.    FINDINGS:  VERTEBRA: Normal vertebral body heights and alignment. No fracture or posttraumatic subluxation.     CANAL/FORAMINA: C3-4, moderate right foraminal stenosis. At C5-6, severe central and severe bilateral foraminal stenosis. At C6-7, severe central and severe bilateral foraminal stenosis.    PARASPINAL: No extraspinal abnormality.      Impression    IMPRESSION:  1.  No fracture or posttraumatic subluxation.  2.  High-grade central and bilateral foraminal stenosis at C5-6 and C6-7..   XR Chest Port 1 View    Narrative    EXAM: XR CHEST PORT 1 VIEW  LOCATION: [facility]  DATE: 5/19/2024    INDICATION: cough  COMPARISON: 9/12/2023.    FINDINGS: The heart size is normal. The thoracic aorta is calcified and tortuous. Mild scarring at the left lung base. The lungs are otherwise clear. Curvilinear lucency at the right hemidiaphragm is likely artifactual. There is no pneumothorax.   Degenerative disease in the spine. Spinal rods.      Impression    IMPRESSION: No acute abnormality.

## 2024-05-21 ENCOUNTER — APPOINTMENT (OUTPATIENT)
Dept: PHYSICAL THERAPY | Facility: CLINIC | Age: 74
DRG: 480 | End: 2024-05-21
Attending: PHYSICIAN ASSISTANT
Payer: COMMERCIAL

## 2024-05-21 LAB
ALBUMIN SERPL BCG-MCNC: 3 G/DL (ref 3.5–5.2)
ALP SERPL-CCNC: 109 U/L (ref 40–150)
ALT SERPL W P-5'-P-CCNC: 42 U/L (ref 0–70)
ANION GAP SERPL CALCULATED.3IONS-SCNC: 12 MMOL/L (ref 7–15)
AST SERPL W P-5'-P-CCNC: 121 U/L (ref 0–45)
BILIRUB SERPL-MCNC: 1.5 MG/DL
BUN SERPL-MCNC: 34.6 MG/DL (ref 8–23)
CALCIUM SERPL-MCNC: 7.9 MG/DL (ref 8.8–10.2)
CHLORIDE SERPL-SCNC: 111 MMOL/L (ref 98–107)
CK SERPL-CCNC: 3001 U/L (ref 39–308)
CREAT SERPL-MCNC: 1.66 MG/DL (ref 0.67–1.17)
DEPRECATED HCO3 PLAS-SCNC: 20 MMOL/L (ref 22–29)
EGFRCR SERPLBLD CKD-EPI 2021: 44 ML/MIN/1.73M2
ERYTHROCYTE [DISTWIDTH] IN BLOOD BY AUTOMATED COUNT: 14.3 % (ref 10–15)
GLUCOSE BLDC GLUCOMTR-MCNC: 137 MG/DL (ref 70–99)
GLUCOSE BLDC GLUCOMTR-MCNC: 173 MG/DL (ref 70–99)
GLUCOSE BLDC GLUCOMTR-MCNC: 183 MG/DL (ref 70–99)
GLUCOSE SERPL-MCNC: 152 MG/DL (ref 70–99)
HCT VFR BLD AUTO: 30.6 % (ref 40–53)
HGB BLD-MCNC: 10 G/DL (ref 13.3–17.7)
HOLD SPECIMEN: NORMAL
MCH RBC QN AUTO: 32.2 PG (ref 26.5–33)
MCHC RBC AUTO-ENTMCNC: 32.7 G/DL (ref 31.5–36.5)
MCV RBC AUTO: 98 FL (ref 78–100)
PLATELET # BLD AUTO: 89 10E3/UL (ref 150–450)
POTASSIUM SERPL-SCNC: 4.4 MMOL/L (ref 3.4–5.3)
PROT SERPL-MCNC: 5.5 G/DL (ref 6.4–8.3)
RBC # BLD AUTO: 3.11 10E6/UL (ref 4.4–5.9)
SODIUM SERPL-SCNC: 143 MMOL/L (ref 135–145)
WBC # BLD AUTO: 10.1 10E3/UL (ref 4–11)

## 2024-05-21 PROCEDURE — 250N000013 HC RX MED GY IP 250 OP 250 PS 637: Performed by: PHYSICIAN ASSISTANT

## 2024-05-21 PROCEDURE — 80053 COMPREHEN METABOLIC PANEL: CPT | Performed by: NURSE PRACTITIONER

## 2024-05-21 PROCEDURE — 85018 HEMOGLOBIN: CPT | Performed by: NURSE PRACTITIONER

## 2024-05-21 PROCEDURE — 250N000013 HC RX MED GY IP 250 OP 250 PS 637: Performed by: INTERNAL MEDICINE

## 2024-05-21 PROCEDURE — 120N000001 HC R&B MED SURG/OB

## 2024-05-21 PROCEDURE — 97162 PT EVAL MOD COMPLEX 30 MIN: CPT | Mod: GP | Performed by: PHYSICAL THERAPIST

## 2024-05-21 PROCEDURE — 99233 SBSQ HOSP IP/OBS HIGH 50: CPT | Performed by: NURSE PRACTITIONER

## 2024-05-21 PROCEDURE — 258N000003 HC RX IP 258 OP 636: Performed by: NURSE PRACTITIONER

## 2024-05-21 PROCEDURE — 250N000011 HC RX IP 250 OP 636: Performed by: PHYSICIAN ASSISTANT

## 2024-05-21 PROCEDURE — 97110 THERAPEUTIC EXERCISES: CPT | Mod: GP | Performed by: PHYSICAL THERAPIST

## 2024-05-21 PROCEDURE — 36415 COLL VENOUS BLD VENIPUNCTURE: CPT | Performed by: NURSE PRACTITIONER

## 2024-05-21 PROCEDURE — 258N000003 HC RX IP 258 OP 636: Performed by: PHYSICIAN ASSISTANT

## 2024-05-21 PROCEDURE — 82550 ASSAY OF CK (CPK): CPT | Performed by: NURSE PRACTITIONER

## 2024-05-21 RX ORDER — OXYCODONE HYDROCHLORIDE 5 MG/1
5-10 TABLET ORAL EVERY 4 HOURS PRN
Status: SHIPPED | DISCHARGE
Start: 2024-05-21 | End: 2024-05-22

## 2024-05-21 RX ORDER — TRAMADOL HYDROCHLORIDE 50 MG/1
50 TABLET ORAL EVERY 6 HOURS PRN
COMMUNITY
Start: 2024-05-21 | End: 2024-05-22

## 2024-05-21 RX ORDER — FAMOTIDINE 20 MG/1
20 TABLET, FILM COATED ORAL DAILY
Status: DISCONTINUED | OUTPATIENT
Start: 2024-05-22 | End: 2024-05-22 | Stop reason: HOSPADM

## 2024-05-21 RX ORDER — ACETAMINOPHEN 325 MG/1
650 TABLET ORAL EVERY 4 HOURS PRN
DISCHARGE
Start: 2024-05-23 | End: 2024-05-22

## 2024-05-21 RX ORDER — AMOXICILLIN 250 MG
1 CAPSULE ORAL 2 TIMES DAILY PRN
DISCHARGE
Start: 2024-05-21 | End: 2024-05-22

## 2024-05-21 RX ADMIN — TIZANIDINE 4 MG: 2 TABLET ORAL at 14:34

## 2024-05-21 RX ADMIN — ATORVASTATIN CALCIUM 10 MG: 10 TABLET, FILM COATED ORAL at 08:29

## 2024-05-21 RX ADMIN — FAMOTIDINE 20 MG: 20 TABLET ORAL at 00:19

## 2024-05-21 RX ADMIN — APIXABAN 2.5 MG: 2.5 TABLET, FILM COATED ORAL at 11:27

## 2024-05-21 RX ADMIN — Medication 2 G: at 02:27

## 2024-05-21 RX ADMIN — APIXABAN 2.5 MG: 2.5 TABLET, FILM COATED ORAL at 20:06

## 2024-05-21 RX ADMIN — Medication 2 G: at 09:01

## 2024-05-21 RX ADMIN — FAMOTIDINE 20 MG: 20 TABLET ORAL at 08:27

## 2024-05-21 RX ADMIN — SENNOSIDES AND DOCUSATE SODIUM 1 TABLET: 8.6; 5 TABLET ORAL at 08:29

## 2024-05-21 RX ADMIN — PANTOPRAZOLE SODIUM 40 MG: 40 TABLET, DELAYED RELEASE ORAL at 08:26

## 2024-05-21 RX ADMIN — SODIUM CHLORIDE: 9 INJECTION, SOLUTION INTRAVENOUS at 00:01

## 2024-05-21 RX ADMIN — SENNOSIDES AND DOCUSATE SODIUM 1 TABLET: 8.6; 5 TABLET ORAL at 20:06

## 2024-05-21 RX ADMIN — SODIUM CHLORIDE: 9 INJECTION, SOLUTION INTRAVENOUS at 14:29

## 2024-05-21 RX ADMIN — OXYCODONE HYDROCHLORIDE 5 MG: 5 TABLET ORAL at 13:23

## 2024-05-21 RX ADMIN — ACETAMINOPHEN 975 MG: 325 TABLET, FILM COATED ORAL at 00:10

## 2024-05-21 RX ADMIN — SENNOSIDES AND DOCUSATE SODIUM 2 TABLET: 8.6; 5 TABLET ORAL at 00:19

## 2024-05-21 RX ADMIN — ACETAMINOPHEN 975 MG: 325 TABLET, FILM COATED ORAL at 16:34

## 2024-05-21 RX ADMIN — METOPROLOL SUCCINATE 150 MG: 100 TABLET, EXTENDED RELEASE ORAL at 08:27

## 2024-05-21 RX ADMIN — LEVOTHYROXINE SODIUM 25 MCG: 25 TABLET ORAL at 08:26

## 2024-05-21 RX ADMIN — SODIUM CHLORIDE: 9 INJECTION, SOLUTION INTRAVENOUS at 02:22

## 2024-05-21 RX ADMIN — ACETAMINOPHEN 975 MG: 325 TABLET, FILM COATED ORAL at 08:27

## 2024-05-21 RX ADMIN — POLYETHYLENE GLYCOL 3350 17 G: 17 POWDER, FOR SOLUTION ORAL at 08:26

## 2024-05-21 RX ADMIN — OXYCODONE HYDROCHLORIDE 5 MG: 5 TABLET ORAL at 08:28

## 2024-05-21 RX ADMIN — OXYCODONE HYDROCHLORIDE 5 MG: 5 TABLET ORAL at 02:43

## 2024-05-21 ASSESSMENT — ACTIVITIES OF DAILY LIVING (ADL)
ADLS_ACUITY_SCORE: 27
ADLS_ACUITY_SCORE: 26
ADLS_ACUITY_SCORE: 27
ADLS_ACUITY_SCORE: 26
ADLS_ACUITY_SCORE: 26
ADLS_ACUITY_SCORE: 27
DEPENDENT_IADLS:: INDEPENDENT
ADLS_ACUITY_SCORE: 27
ADLS_ACUITY_SCORE: 26
ADLS_ACUITY_SCORE: 27
ADLS_ACUITY_SCORE: 26
ADLS_ACUITY_SCORE: 26
ADLS_ACUITY_SCORE: 25
ADLS_ACUITY_SCORE: 26
ADLS_ACUITY_SCORE: 26
ADLS_ACUITY_SCORE: 27
ADLS_ACUITY_SCORE: 27

## 2024-05-21 NOTE — PLAN OF CARE
Problem: Adult Inpatient Plan of Care  Goal: Plan of Care Review  Description: The Plan of Care Review/Shift note should be completed every shift.  The Outcome Evaluation is a brief statement about your assessment that the patient is improving, declining, or no change.  This information will be displayed automatically on your shift  note.  Outcome: Progressing  Goal: Absence of Hospital-Acquired Illness or Injury  Outcome: Progressing  Intervention: Identify and Manage Fall Risk  Recent Flowsheet Documentation  Taken 5/21/2024 0000 by Clark Middleton RN  Safety Promotion/Fall Prevention:   activity supervised   clutter free environment maintained   increased rounding and observation   nonskid shoes/slippers when out of bed   room door open   room near nurse's station   safety round/check completed   supervised activity  Intervention: Prevent Skin Injury  Recent Flowsheet Documentation  Taken 5/21/2024 0000 by Clark Middleton RN  Body Position: supine, head elevated  Device Skin Pressure Protection: pressure points protected  Intervention: Prevent and Manage VTE (Venous Thromboembolism) Risk  Recent Flowsheet Documentation  Taken 5/21/2024 0000 by Clark Middleton RN  VTE Prevention/Management: SCDs (sequential compression devices) on  Intervention: Prevent Infection  Recent Flowsheet Documentation  Taken 5/21/2024 0000 by Clark Middleton RN  Infection Prevention:   hand hygiene promoted   single patient room provided  Goal: Optimal Comfort and Wellbeing  Outcome: Progressing  Intervention: Monitor Pain and Promote Comfort  Recent Flowsheet Documentation  Taken 5/21/2024 0252 by Clark Middleton RN  Pain Management Interventions: medication (see MAR)  Taken 5/21/2024 0015 by Clark Middleton RN  Pain Management Interventions: medication (see MAR)  Goal: Readiness for Transition of Care  Outcome: Progressing     Problem: Risk for Delirium  Goal: Optimal Coping  Outcome: Progressing  Intervention: Optimize Psychosocial  Adjustment to Delirium  Recent Flowsheet Documentation  Taken 5/21/2024 0000 by Clark Middleton RN  Supportive Measures: active listening utilized  Goal: Improved Behavioral Control  Outcome: Progressing  Intervention: Minimize Safety Risk  Recent Flowsheet Documentation  Taken 5/21/2024 0000 by Clark Middleton RN  Communication Enhancement Strategies:   call light answered in person   repetition utilized  Enhanced Safety Measures: pain management  Goal: Improved Attention and Thought Clarity  Outcome: Progressing  Intervention: Maximize Cognitive Function  Recent Flowsheet Documentation  Taken 5/21/2024 0000 by Clark Middleton RN  Reorientation Measures: clock in view  Goal: Improved Sleep  Outcome: Progressing     Problem: Hip Fracture Medical Management  Goal: Optimal Coping with Change in Health Status  Outcome: Progressing  Intervention: Support Psychosocial Response to Injury  Recent Flowsheet Documentation  Taken 5/21/2024 0000 by Clark Middleton RN  Supportive Measures: active listening utilized  Goal: Absence of Bleeding  Outcome: Progressing  Goal: Effective Bowel Elimination  Outcome: Progressing  Goal: Baseline Cognitive Function Maintained  Outcome: Progressing  Intervention: Maximize Cognitive Function  Recent Flowsheet Documentation  Taken 5/21/2024 0000 by Clark Middleton RN  Reorientation Measures: clock in view  Goal: Absence of Embolism  Outcome: Progressing  Intervention: Prevent or Manage Embolism Risk  Recent Flowsheet Documentation  Taken 5/21/2024 0000 by Clark Middleton RN  VTE Prevention/Management: SCDs (sequential compression devices) on  Goal: Fracture Stability  Outcome: Progressing  Goal: Optimal Functional Performance  Outcome: Progressing  Intervention: Promote Optimal Functional Status  Recent Flowsheet Documentation  Taken 5/21/2024 0000 by Clark Middleton RN  Activity Management: activity adjusted per tolerance  Goal: Pain Control and Function  Outcome: Progressing  Intervention:  Manage Acute Orthopaedic-Related Pain  Recent Flowsheet Documentation  Taken 5/21/2024 0252 by Clark Middleton, RN  Pain Management Interventions: medication (see MAR)  Taken 5/21/2024 0015 by Clark Middleton, RN  Pain Management Interventions: medication (see MAR)  Goal: Effective Urinary Elimination  Outcome: Progressing       Pt remains alert and intermittently confused, but now remembers that he is in the hospital and why.   CMS remains intact throughout.   Oxycodone given once for pain and Pt has been able to rest.   Surgical dressings are clean and intact.   Pt is due to void. Will bladder scan if no void soon.   There has been a total of 130 ml from the Hemovac drain so far.   Vitals have been stable.   Capnography has been within normal limits.   Oxygen saturations are stable on 2 LPM via nasal canula.   Will continue to assess and treat pain as able, monitor CMS , capnography, and follow plan of care.

## 2024-05-21 NOTE — PROGRESS NOTES
05/21/24 0738   Appointment Info   Signing Clinician's Name / Credentials (PT) Concepcion Lloyd PT, DPT, ATC, LAT   Rehab Comments (PT) PT eval and treat post op hip. Activity orders: up with assist, ambulate, up in chair, IS, Ice, 20# WB for 6 weeks   Quick Adds   Quick Adds Certification   Living Environment   People in Home alone   Current Living Arrangements house   Home Accessibility stairs to enter home;stairs within home   Number of Stairs, Main Entrance 2   Stair Railings, Main Entrance none   Number of Stairs, Within Home, Primary seven   Stair Railings, Within Home, Primary railing on right side (ascending)   Transportation Anticipated family or friend will provide   Living Environment Comments notes friends help with some things at baseline, but no one can physcially support his care needs at home.   Self-Care   Usual Activity Tolerance moderate   Current Activity Tolerance poor   Regular Exercise No   Equipment Currently Used at Home tub bench;cane, straight   Fall history within last six months yes   Number of times patient has fallen within last six months 4   Activity/Exercise/Self-Care Comment meals on wheels. friends help with housekeeping and yard work occasionally   General Information   Onset of Illness/Injury or Date of Surgery 05/20/24   Referring Physician Dr. Wagner   Patient/Family Therapy Goals Statement (PT) TCU   Pertinent History of Current Problem (include personal factors and/or comorbidities that impact the POC) Patient is a 73 year old male, day 1 status post left hip fracture ORIF with nailing by Dr. Wagner, 700mL EBL, general anesthesia. Patient with a previous medical history of HTN, obesity, GERD, HLD, DM type 2, SHONDA, CKD.   Existing Precautions/Restrictions fall   Weight-Bearing Status - LUE full weight-bearing   Weight-Bearing Status - RUE full weight-bearing   Weight-Bearing Status - LLE partial weight-bearing (% in comments)  (20#)   Weight-Bearing Status - RLE weight-bearing  as tolerated   General Observations initially groggy as awoke from rest, but with stimulation improved engagement. fearful of use of LLE   Cognition   Affect/Mental Status (Cognition) anxious   Orientation Status (Cognition) oriented to;person;place;situation   Follows Commands (Cognition) increased processing time needed;repetition of directions required   Behavioral Issues overwhelmed easily   Cognitive Status Comments tangential and easily distracted, repeating details and fixated on others, difficult to move along the assessment   Pain Assessment   Patient Currently in Pain Yes, see Vital Sign flowsheet   Integumentary/Edema   Integumentary/Edema Comments post op dressing CDI and hemovac intact   Posture    Posture Forward head position;Protracted shoulders   Range of Motion (ROM)   ROM Comment left hip 0-30 flexion with severe pain. LLE AROM limited by pain. RLE and bilat UEs WFL   Strength (Manual Muscle Testing)   Strength Comments globally deconditioned. weak RLE SLR flexion. weak LLE throughout secondary to surgery and pain   Bed Mobility   Comment, (Bed Mobility) unable to progress due to pain and poor tolerance to HEP   Sensory Examination   Sensory Perception Comments notes intact, unable to formally assess   Muscle Tone   Muscle Tone Comments tremors present, most signfiicant RUE and upper trunk   Clinical Impression   Criteria for Skilled Therapeutic Intervention Yes, treatment indicated   PT Diagnosis (PT) impaired mobility, muscle weakness, joint stiffness   Influenced by the following impairments day 1 status post traumatic fracture repair   Functional limitations due to impairments pain, fear, limited WB   Clinical Presentation (PT Evaluation Complexity) evolving   Clinical Presentation Rationale post op status, clinical judgement   Clinical Decision Making (Complexity) moderate complexity   Planned Therapy Interventions (PT) balance training;bed mobility training;cryotherapy;gait training;home  exercise program;patient/family education;ROM (range of motion);strengthening;stair training;transfer training;progressive activity/exercise;home program guidelines   Risk & Benefits of therapy have been explained evaluation/treatment results reviewed;participants voiced agreement with care plan;participants included;patient   Clinical Impression Comments Currently patient's mobility is limited by pain and fear, unable to progress bed mobility this date and train 20# WB limit, however patient verbalized several times his limited WB. Anticipate with medical management, encouragement to mobilize and time out from surgery patient will begin to tolerate progressive mobility. Given post op status, function much below baseline, lack of support at home and history of falls patient would benefit from TCU placement in order to achieve optimal post operative outcomes with safety.   PT Total Evaluation Time   PT Eval, Moderate Complexity Minutes (60951) 15   Therapy Certification   Start of care date 05/21/24   Certification date from 05/21/24   Certification date to 05/26/24   Medical Diagnosis s/p left hip ORIF   Physical Therapy Goals   PT Frequency Daily   PT Predicted Duration/Target Date for Goal Attainment 05/26/24   PT Goals Transfers;Bed Mobility;Gait   PT: Bed Mobility Modified independent;Supine to/from sit;Within precautions   PT: Transfers Modified independent;Sit to/from stand;Assistive device;Within precautions   PT: Gait Modified independent;Standard walker;10 feet;Within precautions   PT Discharge Planning   PT Plan daily. bed mobility, exercises, sitting balance, transfers   PT Discharge Recommendation (DC Rec) Transitional Care Facility   PT Rationale for DC Rec Patient from home alone, using a cane at baseline, stairs to enter and no consistent assistance available. Currently patient's mobility is limited by pain and fear, unable to progress bed mobility this date and train 20# WB limit, however patient  verbalized several times his limited WB. Anticipate with medical management, encouragement to mobilize and time out from surgery patient will begin to tolerate progressive mobility. Given post op status, function much below baseline, lack of support at home and history of falls patient would benefit from TCU placement in order to achieve optimal post operative outcomes with safety.   PT Brief overview of current status HEP with total assist. Unable to complete bed mobility due to command following/attention, fear, pain.   Total Session Time   Total Session Time (sum of timed and untimed services) 15     Thank you for your referral.  Concepcion Lloyd, PT, DPT, ATC, LAT    [facility]  O: [phone]  E: [email or web address]

## 2024-05-21 NOTE — ANESTHESIA PROCEDURE NOTES
"Fascia iliaca Procedure Note    Pre-Procedure   Staff -        Performed By: CRNA       Location: OR       Pre-Anesthestic Checklist: patient identified, IV checked, site marked, risks and benefits discussed, informed consent, monitors and equipment checked, pre-op evaluation, at physician/surgeon's request and post-op pain management  Timeout:       Correct Patient: Yes        Correct Procedure: Yes        Correct Site: Yes        Correct Position: Yes        Correct Laterality: Yes        Site Marked: Yes  Procedure Documentation  Procedure: Fascia iliaca       Laterality: left       Patient Position: supine       Patient Prep/Sterile Barriers: sterile gloves, mask       Skin prep: Chloraprep       Local skin infiltrated with 1 mL of.        Needle Type: insulated       Needle Gauge: 22.        Needle Length (Inches): 3.13        Needle Length (millimeters): 100        Ultrasound guided       1. Ultrasound was used to identify targeted nerve, plexus, vascular marker, or fascial plane and place a needle adjacent to it in real-time.       2. Ultrasound was used to visualize the spread of anesthetic in close proximity to the above referenced structure.       Nerve Stim: Initial Level 0.05 mA.  Lowest motor response mA.    Assessment/Narrative         The placement was negative for: blood aspirated, painful injection and site bleeding       Paresthesias: No.       Test dose of mL at.         Test dose negative, 3 minutes after injection, for signs of intravascular, subdural, or intrathecal injection.       Bolus given via needle..        Secured via.        Insertion/Infusion Method: Single Shot       Complications: none       Injection made incrementally with aspirations every 5 mL.     Comments:  Pt tolerated procedure well.    No complications noted.  Will follow if needed.      FOR [facility] ([facility]) ONLY:   Pain Team Contact information: please page the Pain Team Via [employer]. Search \"Pain\". During daytime hours, " please page the attending first. At night please page the resident first.

## 2024-05-21 NOTE — PLAN OF CARE
Goal Outcome Evaluation:      Plan of Care Reviewed With: patient    Overall Patient Progress: improvingOverall Patient Progress: improving     A/O. VSS on room air while awake - does wear 2L/min while asleep to maintain SpO2>90%. Rating pain moderate. Treating pain with oral PRN medications, repositioning, ice, and rest. Did stand at bedside x2 this shift with heavy assist of two. Able to verbalize weight-bearing restrictions. Dressing covering incision - CDI. Drain to bulb suction with bloody output. Voiding in small amounts in urinal this shift. Continuing to monitor post-void residual amounts. Bowel sounds active and passing gas. IVF infusing per MAR. Tolerating diet. Did have friend visiting at bedside this shift. Plan for bone marrow biopsy tomorrow. Will need TCU at discharge. Therapies and SW following.    /45 (BP Location: Left arm)   Pulse 72   Temp 99.5  F (37.5  C) (Oral)   Resp 16   SpO2 100%        None known

## 2024-05-21 NOTE — PROGRESS NOTES
Pt arrived from PACU disoriented and lethargic, easily startled, and shivering. He denied pain at the time of retuning back to his room. Surgical dressings are intact with hemovac drain in place. Initial vitals were stable.   Shortly after arrival Pt was able to take oral medications with no complication.   Capnography within normal limits, oxygen saturations stable with 2 LPM via nasal canula.

## 2024-05-21 NOTE — ANESTHESIA CARE TRANSFER NOTE
Patient: Gucci Logan    Procedure: Procedure(s):  open reduction internal fixation hip nailing left       Diagnosis: Intertrochanteric fracture of left femur, closed, initial encounter (H) [S73.381A]  Diagnosis Additional Information: No value filed.    Anesthesia Type:   General     Note:    Oropharynx: oropharynx clear of all foreign objects and spontaneously breathing  Level of Consciousness: drowsy  Oxygen Supplementation: face mask    Independent Airway: airway patency satisfactory and stable  Dentition: dentition unchanged  Vital Signs Stable: post-procedure vital signs reviewed and stable  Report to RN Given: handoff report given  Patient transferred to: PACU    Handoff Report: Identifed the Patient, Identified the Reponsible Provider, Reviewed the pertinent medical history, Discussed the surgical course, Reviewed Intra-OP anesthesia mangement and issues during anesthesia, Set expectations for post-procedure period and Allowed opportunity for questions and acknowledgement of understanding      Vitals:  Vitals Value Taken Time   BP     Temp     Pulse     Resp     SpO2         Electronically Signed By: DIANE Hart CRNA  May 20, 2024  10:03 PM

## 2024-05-21 NOTE — OR NURSING
Transfer from  pacu to Room -2 med-surg  Transferred to bed via own bed     S: 74 y/o m  S/P ORIF left hip nailing       Anesthesia Type:  general       Surgeon:  Dr. Wagner       Allergies:  See Medication Reconciliation Record       DNR: no      B:  Pertinent Medical History:   Past Medical History:   Diagnosis Date    Arthritis     Chronic, continuous use of opioids     Esophageal reflux 03/01/2014    Hearing problem     Hiatal hernia     Hypertension     Jaundice 05/31/2008    Liver disease     Obesity 01/24/2013    Obstructive sleep apnea     Sleep apnea     Advised CPAP machine. Not keen to use it.     Syncope, unspecified syncope type 2/20/2023       Surgical History:    Past Surgical History:   Procedure Laterality Date    ARTHROSCOPY KNEE RT/LT      (L) with partial medial meniscectomy    CATARACT IOL, RT/LT  Nov and Dec 2017    COLONOSCOPY N/A 4/24/2019    Procedure: COLONOSCOPY, WITH POLYPECTOMY AND BIOPSY;  Surgeon: Leventhal, Thomas Michael, MD;  Location: [facility] OR    COLONOSCOPY N/A 9/14/2022    Procedure: COLONOSCOPY;  Surgeon: Rafa Renee MD;  Location:  GI    DACRYOCYSTORHINOSTOMY Left 10/16/2018    Procedure: DACRYOCYSTORHINOSTOMY;  Surgeon: Madhu Krause MD;  Location: [facility]    ENDOSCOPIC ENDONASAL SURGERY  1994    ENDOSCOPY  2-19-15    ESOPHAGOSCOPY, GASTROSCOPY, DUODENOSCOPY (EGD), COMBINED N/A 4/24/2019    Procedure: COMBINED ESOPHAGOSCOPY, GASTROSCOPY, DUODENOSCOPY (EGD) - hold aspirin ibuprofen or naproxen for one week prior (per physician order);  Surgeon: Leventhal, Thomas Michael, MD;  Location:  OR    ESOPHAGOSCOPY, GASTROSCOPY, DUODENOSCOPY (EGD), COMBINED N/A 5/23/2023    Procedure: Esophagoscopy, gastroscopy, duodenoscopy, combined;  Surgeon: Rafa Renee MD;  Location:  GI    EYE SURGERY      Hernia surgery Left 1994    NASAL/SINUS POLYPECTOMY      REPAIR PTOSIS Left 10/16/2018    Procedure: LEFT UPPER LID PTOSIS AND BILATERAL  BROW PTOSIS REPAIR WITH LEFT  "DACRYOCYSTORHINOSTOMY ;  Surgeon: Madhu Krause MD;  Location: [facility]    REPAIR PTOSIS BROW Bilateral 10/16/2018    Procedure: REPAIR PTOSIS BROW;  Surgeon: Madhu Krause MD;  Location: [facility]    ROTATOR CUFF REPAIR RT/LT Right     ZZC OPEN RX ANKLE DISLOCATN+FIXATN      (R)    ZZC SPINAL FUSION,ANT,EA ADNL LEVEL      T12 - L1      A:  EBL: 700        IVF:  NS 1500 ml additional 100 ml in pacu        UOP:  no void        NPO: No         Vomiting:  No         Drainage: new drsg left hip        Skin Integrity: see pre-op admit assessment         RFO: No         Brace/sling/equipment: Yes        CMS: warm pink toes        Pain: voices pain at close of pacu \"just a little bit\"       See PACU record for ongoing assessment, vital signs and pain assessment. X-rays and blood drawn done in pacu       Report given to recvng RN on med-surg: eugenie  R: Post-Op vitals and assessments as ordered/indicated per patient's condition.       Follow Post-Op orders and notify Physician prn.       Continue to involve patient/family in plan of care and discharge planning.       Reinforce Pre-Operative education.       Implement skin safety interventions as appropriate.  Name: Kala GUAJARDO           "

## 2024-05-21 NOTE — DISCHARGE INSTRUCTIONS
[facility] Discharge Instructions Hip Fracture Fixation  [phone]  Bone and Joint Service Line for issues or concerns     Discharge disposition:  Discharged to TCU   Wound Care/Dressings: Keep the Aquacel (surgical) dressing(s) on until follow-up.  Drain dressing change daily with gauze and tape or Band-Aid until 2 days without drainage then no dressing needed.   Diet: Orders Placed This Encounter      Advance Diet as Tolerated: Regular Diet Adult      Pain Control: Pain medication given, as pain gets better wean to tylenol as needed.  Oxycodone sent with the patient   Activity: Partial weightbearing no more than 20 pounds left lower extremity x 6 weeks  No Hip Precautions.   Icing: Ice on 15 minutes then off 15 minutes as needed for pain and swelling    Follow-up: Return to clinic in 2 weeks at [facility] with Rafa Henson PA-C, at that time we will get AP and lateral of left hip   When to Call the Office: Temperature greater than 101.5 degrees Fahrenheit.  Increasing pain not relieved by medicine or icing.  Excessive drainage from the incision that includes bright red blood.  Drainage from the incision that appears yellow, pus-like, or foul smelling.  Persistent nausea or vomiting.    Call 911 if you experience any chest pain and/or shortness or breath.   Additional instructions: Anticoagulation: Eliquis 2.5mg BID x 6 weeks, then every day x 1 year.

## 2024-05-21 NOTE — ANESTHESIA POSTPROCEDURE EVALUATION
Patient: Gucci Logan    Procedure: Procedure(s):  open reduction internal fixation hip nailing left       Anesthesia Type:  General    Note:  Disposition: Outpatient   Postop Pain Control: Uneventful            Sign Out: Well controlled pain   PONV: No   Neuro/Psych: Uneventful            Sign Out: Acceptable/Baseline neuro status   Airway/Respiratory: Uneventful            Sign Out: Acceptable/Baseline resp. status   CV/Hemodynamics: Uneventful            Sign Out: Acceptable CV status   Other NRE: NONE   DID A NON-ROUTINE EVENT OCCUR? No    Event details/Postop Comments:  Pt was happy with anesthesia care.  No complications.  I will follow up with the pt if needed.       Last vitals:  Vitals Value Taken Time   /52 05/20/24 2245   Temp     Pulse 75 05/20/24 2245   Resp 12 05/20/24 2245   SpO2 97 % 05/20/24 2245       Electronically Signed By: DIANE Landrum CRNA  May 21, 2024  2:31 PM

## 2024-05-21 NOTE — PROGRESS NOTES
"[facility]    Progress note    73 year old male POD#1 s/p left Hip open reduction internal fixation with TFN nail by Dr. Wagner  S: Patient seen at bedside in no apparent distress, alert and pleasant.  I discussed surgery and rehabilitation with the patient.  He denies numbness, tingling or major pain issues.  Showed x-rays explained x-rays.  Discussed possible transitional care and he is on board.  O: CMS intact left LE, DF/PF intact and 5 out of 5 strength       Aquacel dressings on, clean and dry with a good seal, no shadowing       Left hip with minimal swelling and no erythema or ecchymosis        Drain in and intact       Bilateral calves soft and non tender    Vital signs:  Temp: 98.4  F (36.9  C) Temp src: Oral BP: (!) 157/57 Pulse: 67   Resp: 16 SpO2: 99 % O2 Device: Nasal cannula Oxygen Delivery: 2 LPM      Estimated body mass index is 34.47 kg/m  as calculated from the following:    Height as of 5/17/24: 1.803 m (5' 10.98\").    Weight as of 5/17/24: 112 kg (247 lb).      Hemoglobin   Date Value Ref Range Status   05/21/2024 10.0 (L) 13.3 - 17.7 g/dL Final   04/05/2021 14.1 13.3 - 17.7 g/dL Final     INR   Date Value Ref Range Status   05/20/2024 1.35 (H) 0.85 - 1.15 Final   04/05/2021 1.14 0.86 - 1.14 Final         A/P:  1. Pain-controlled with Tylenol, Dilaudid IV yesterday, oxycodone 5 mg x 1, Zanaflex yesterday.  I discontinued Atarax because the patient already has Zanaflex.  2. DVT Prophylaxis-Eliquis 2.5mg BID x 6 weeks, then every day x 1 year.  Discussed with Dr. Villalta and he is okay with this dose but we are watching low platelets.  3. Weight Bearing Status-partial weightbearing left lower extremity no more than 20 pounds x 6 weeks  4. Hip Precautions-none  5. Physical Therapy-to see patient today, anticipating transitional care secondary to steps at home  6. Occupational Therapy-see patient today, ADLs  7. Case Management-anticipating transitional care secondary to 7 " steps and patient partial weightbearing.  8. Hospitalist-discussed case with Dr. Villalta last evening.  Secure message sent to Dr. Damon this morning  9. Incision-no signs or symptoms of infection  10. Drain-Hemovac drain to be pulled by RN per order protocol (done on day of discharge or at latest 48hrs.)  11. Plan-anticipate possible discharge to transitional care secondary to partial weightbearing status and multiple stairs at home.  Follow-up in 2 weeks with ISA Henson.  Orthopedic discharge orders will be placed when facility is established.  Plan discussed with RN and also secure message sent to Dr. Damon and will talk to Dr. Wagner in surgery in a little bit.    Rafa Henson PA-C  5/21/2024

## 2024-05-21 NOTE — PROGRESS NOTES
Ortho Note:    1.  Weightbearing: Partial weightbearing no more than 20 pounds left lower extremity x 6 weeks  2.  Anticoagulation: Eliquis 2.5 mg twice daily x 2 weeks, first dose tonight.  3.  Dispo: Patient has 7 steps at his house and with partial weightbearing may benefit from transitional care.  Case management order in  4.  Hospitalist: Secure text message sent to Dr. Pawan Henson PA-C

## 2024-05-21 NOTE — PROGRESS NOTES
[facility]    Medicine Progress Note - Hospitalist Service    Date of Admission:  5/19/2024    Assessment & Plan   Fall from standing, initial encounter   Intertrochanteric fracture of left femur, closed, initial encounter (H)  sustained a ground-level fall in his yard.   Patient indicated that he was outside working in his yard breaking up pallets, when he tripped over one of the wood pallets.  The patient lives alone,and  said that he was  lying on the ground for about 3 hours until  a neighbor was able to come over and help him,   with calling EMS.   He was uncertain if he hit his head,  head CT shows not acute changes.  Denied any loss of consciousness.  X-ray of pelvis and left hip showed fracture through the femur at the intertrochanteric/subtrochanteric junction.  Orthopedics consulted in the ED and surgery is planned today    Plan:   -POD 1 s/p left hip fx repair.  Hemodynamically stable.  Platelets 89,000 today.    -Will need TCU at discharge  -Is also receiving a bone marrow biopsy today (as coordinated by Jaylen/onc/ortho).  Will re-evaluate his readiness for TCU tomorrow        Elevated CK  On admission CK elevated at 372.  Patient found down after being on the ground for several hours.  CK 3001 today although he is post op day one.  Will re-evaluate tomorrow and if down he may go to TCU and have follow up labs outpatient  -continue crystalloid overnight  -Repeat CK in the morning     Anemia - appears chronic  Chronic Thrombocytopenia  Followed by Dr. Jim cooper/onc, was supposed to be seen, but didn't finishe visit on 5/17.    Hemoglobin and platelets stable since April 2024.  Dr. Fernandez recommending further evaluation with a bone marrow biopsy (which is reportedly being done while he is hospitalized) and further lab work.  Baseline platelets in the 50's, hemoglobin 10.2.  Patient denies history of  bleeding problems  -Dr. Wagner, reportedly obtained bone marrow  biopsy  -Follow-up with heme-onc as outpatient     Hypertrophic cardiomyopathy  Echocardiogram 12/2023 showing global and regional left ventricular function is hyperkinetic with an EF of greater than 70%.  Basal septum 23 mm.  Mild EDGAR of anterior mitral leaflet.  LVOT gradient not assessed.  Concern for hypertrophic cardiomyopathy.  Right ventricular function size wall motion and thickness were normal.  Patient had an abnormal stress MRI 12/14/2023 which showed asymmetric septal hypertrophy.  Patient also had Zio patch which showed no sustained arrhythmias to account for symptoms of dyspnea on exertion.  Symptoms included dyspnea on exertion.  1/08 patient saw Dr. Killian, cardiology [facility].  1/19 had an elective coronary angiogram which ruled out epicardial disease and LVOT tract obstruction, normal Coronary arteries and NVED not elevated. No further follow-up with cardiology  -Continue metoprolol  -Follow-up with cardiology     DM type 2  Hemoglobin A1c 5.5.  Prior to admission metformin  -Hold metformin in the perioperative time  -Monitor blood glucose  -Consider restarting once patient routinely taking oral     HTN  Home medications include hydrochlorothiazide, metoprolol, spironolactone, valsartan.  Patient has been normotensive  -Holding hydrochlorothiazide, spironolactone, valsartan in the perioperative time  -Continue metoprolol     CKD stage 3  Baseline creatinine 1.2-1.3.  Cr 1.66.  -avoid nephrotoxic medications  -Monitor basic metabolic panel daily     Chronic hepatitis C  Liver cirrhosis secondary to NAFLD  Portal HTN  Chronic thrombus of SMV and portal vein  Splenomegaly  Elevated INR  Diagnosed with cirrhosis 5/2008 via liver biopsy, hepatitis C diagnosed in 2004.  Per EGD 5/23 showed mild portal hypertension gastropathy and gastric cardia gastric fundus and gastric body.  Last seen by Dr. Renteria GI April 2023 and liver disease was compensated at that time.    INR elevated at 1.35 on 5/20.  Alk  "phos 138, ALT 35, AST 48, direct bilirubin elevated at 0.39, total bilirubin elevated at 2.0  -Monitor liver function daily  -repeat inr in am  -follow up with GI as outpatient     Obstructive sleep apnea  Patient reports he does have obstructive sleep apnea but has never used CPAP and never will     Hypothyroidism  4/16 TSH 5.38, free T41.13  -Continue home dose of levothyroxine     Former smoker  Patient reports only smoked for about 5 years and quit many many years ago          Diet: Advance Diet as Tolerated: Regular Diet Adult    DVT Prophylaxis: DOAC  Rankin Catheter: Not present  Lines: None     Cardiac Monitoring: None  Code Status: Full Code      Clinically Significant Risk Factors              # Hypoalbuminemia: Lowest albumin = 3 g/dL at 5/21/2024  5:33 AM, will monitor as appropriate  # Coagulation Defect: INR = 1.35 (Ref range: 0.85 - 1.15) and/or PTT = N/A, will monitor for bleeding  # Thrombocytopenia: Lowest platelets = 55 in last 2 days, will monitor for bleeding   # Hypertension: Noted on problem list        # Obesity: Estimated body mass index is 34.47 kg/m  as calculated from the following:    Height as of 5/17/24: 1.803 m (5' 10.98\").    Weight as of 5/17/24: 112 kg (247 lb)., PRESENT ON ADMISSION     # Financial/Environmental Concerns: none         Disposition Plan     Medically Ready for Discharge: Anticipated in 2-4 Days           The patient's care was discussed with the  entire care team .    Kaylen Rojas CNP  Hospitalist Service  [facility]  Securely message with [employer] (more info)  Text page via [employer] Paging/Directory   ______________________________________________________________________    Interval History   No acute events overnight, chart reviewed, patient seen    Physical Exam   Vital Signs: Temp: 99.5  F (37.5  C) Temp src: Oral BP: 137/45 Pulse: 72   Resp: 16 SpO2: 100 % O2 Device: None (Room air) Oxygen Delivery: 2 LPM  Weight: 0 lbs 0 oz    Gen:  " Lying in bed no acute distress  HEENT:  normocephalic, atraumatic, oropharynx clear  Resp:  CTA posteriorly, normal respiratory effort  Card:  S1,S2, RRR no murmur, rub or gallop  Abd:  Soft nondistended, non tender,  normoactive bowel sounds   Skin:  left hip incision covered, +cms  Neuro:  Neuro exam non focal      Medical Decision Making       45 MINUTES SPENT BY ME on the date of service doing chart review, history, exam, documentation & further activities per the note.        Data     I have personally reviewed the following data over the past 24 hrs:    10.1  \   10.0 (L)   / 89 (L)     143 111 (H) 34.6 (H) /  137 (H)   4.4 20 (L) 1.66 (H) \     ALT: 42 AST: 121 (H) AP: 109 TBILI: 1.5 (H)   ALB: 3.0 (L) TOT PROTEIN: 5.5 (L) LIPASE: N/A       Imaging results reviewed over the past 24 hrs:   Recent Results (from the past 24 hour(s))   XR Surgery CHERY G/T 5 Min Fluoro w Stills    Narrative    This exam was marked as non-reportable because it will not be read by a   radiologist or a [place] non-radiologist provider.         XR Hip Left 2-3 Views    Narrative    EXAM: XR HIP LEFT 2-3 VIEWS  LOCATION: [facility]  DATE: 5/20/2024    INDICATION: Portable in PACU AP Lat of L hip Stat after surgery  COMPARISON: 5/19/2024 at 4:44 PM      Impression    IMPRESSION: Postoperative changes of left femoral neck screw. Left intramedullary oliver with cerclage wire. Anatomic alignment of the oblique intertrochanteric fracture proximal left femur. Minimal degenerative changes in the left hip. Surgical drain.

## 2024-05-21 NOTE — CONSULTS
Care Management Initial Consult    General Information  Assessment completed with: Patient,    Type of CM/SW Visit: Initial Assessment    Primary Care Provider verified and updated as needed: Yes   Readmission within the last 30 days: no previous admission in last 30 days      Reason for Consult: discharge planning  Advance Care Planning: Advance Care Planning Reviewed: no concerns identified          Communication Assessment  Patient's communication style: spoken language (English or Bilingual)    Hearing Difficulty or Deaf: yes   Wear Glasses or Blind: no    Cognitive  Cognitive/Neuro/Behavioral: WDL  Level of Consciousness: alert, confused  Arousal Level: opens eyes spontaneously  Orientation: disoriented to, place, time, situation  Mood/Behavior: calm  Best Language: 0 - No aphasia  Speech: clear    Living Environment:   People in home: alone     Current living Arrangements: house      Able to return to prior arrangements: yes       Family/Social Support:  Care provided by: self, friend  Provides care for: no one  Marital Status: Single  Friend, Neighbor          Description of Support System:           Current Resources:   Patient receiving home care services: No     Community Resources: Meals on Wheels  Equipment currently used at home: tub bench, cane, straight  Supplies currently used at home:      Employment/Financial:  Employment Status: retired        Financial Concerns: none           Does the patient's insurance plan have a 3 day qualifying hospital stay waiver?  Yes     Which insurance plan 3 day waiver is available? Alternative insurance waiver    Will the waiver be used for post-acute placement? Yes    Lifestyle & Psychosocial Needs:  Social Determinants of Health     Food Insecurity: Low Risk  (12/20/2023)    Food Insecurity     Within the past 12 months, did you worry that your food would run out before you got money to buy more?: No     Within the past 12 months, did the food you bought just not  last and you didn t have money to get more?: No   Depression: Not at risk (1/5/2024)    PHQ-2     PHQ-2 Score: 0   Housing Stability: Low Risk  (12/20/2023)    Housing Stability     Do you have housing? : Yes     Are you worried about losing your housing?: No   Tobacco Use: Medium Risk (5/17/2024)    Patient History     Smoking Tobacco Use: Former     Smokeless Tobacco Use: Never     Passive Exposure: Never   Financial Resource Strain: Low Risk  (12/20/2023)    Financial Resource Strain     Within the past 12 months, have you or your family members you live with been unable to get utilities (heat, electricity) when it was really needed?: No   Alcohol Use: Not on file   Transportation Needs: Low Risk  (12/20/2023)    Transportation Needs     Within the past 12 months, has lack of transportation kept you from medical appointments, getting your medicines, non-medical meetings or appointments, work, or from getting things that you need?: No   Physical Activity: Not on file   Interpersonal Safety: Low Risk  (4/30/2024)    Interpersonal Safety     Do you feel physically and emotionally safe where you currently live?: Yes     Within the past 12 months, have you been hit, slapped, kicked or otherwise physically hurt by someone?: No     Within the past 12 months, have you been humiliated or emotionally abused in other ways by your partner or ex-partner?: No   Stress: Not on file   Social Connections: Unknown (12/6/2023)    [facility]    Social Connections     Frequency of Communication with Friends and Family: Not on file   Health Literacy: Not on file       Functional Status:  Prior to admission patient needed assistance:   Dependent ADLs:: Independent, Ambulation-cane  IADLs- Independent, does get Meals on Wheels        Mental Health Status:  Mental Health Status: No Current Concerns       Chemical Dependency Status:  Chemical  Dependency Status: No Current Concerns             Values/Beliefs:  Spiritual, Cultural Beliefs, Rastafarian Practices, Values that affect care:            Values/Beliefs Comment: Unknown    Additional Information:  Care Management has been consulted for discharge planning.      Writer visited with patient and reviewed the information above.  Patient lives home alone.  Patient reports having two friends that help him out with some things around the house, including lifting things at times, as patient reported having a history of back surgery.      Discussed the current recommendation for TCU placement.  Patient in agreement with this.  Writer provided patient with a list of Medicare certified local TCU's and option of going to Medicare.gov to look at star ratings.  Referrals have been sent to the following facilities, per patient request:    [facility] (SNF)  Pending - Request Sent N/A [address] [facility] [address] [phone] --   Current Capacity last updated by Dejan Greer RN on 9/1/2023  3:01 PM    Has secure memory unit            [facility] (TCU)  Pending - Request Sent N/A [address] [facility] [id number] [phone] --     Writer did discuss the $16.00 per day private room fee at [facility].      Discussed current recommendation for agency wheelchair transport and private pay cost for this.      Hospitalist anticipates possible discharge tomorrow.      Care Management will continue to follow and assist with discharge planning.      Josephine Interiano, [facility]   [phone]

## 2024-05-22 ENCOUNTER — DOCUMENTATION ONLY (OUTPATIENT)
Dept: GERIATRICS | Facility: CLINIC | Age: 74
End: 2024-05-22
Payer: COMMERCIAL

## 2024-05-22 VITALS
OXYGEN SATURATION: 98 % | DIASTOLIC BLOOD PRESSURE: 43 MMHG | TEMPERATURE: 98.2 F | RESPIRATION RATE: 16 BRPM | HEART RATE: 67 BPM | SYSTOLIC BLOOD PRESSURE: 125 MMHG

## 2024-05-22 DIAGNOSIS — D72.819 LEUKOPENIA, UNSPECIFIED TYPE: ICD-10-CM

## 2024-05-22 LAB
ACANTHOCYTES BLD QL SMEAR: NORMAL
ALBUMIN SERPL BCG-MCNC: 2.7 G/DL (ref 3.5–5.2)
ALP SERPL-CCNC: 89 U/L (ref 40–150)
ALT SERPL W P-5'-P-CCNC: 27 U/L (ref 0–70)
ANION GAP SERPL CALCULATED.3IONS-SCNC: 7 MMOL/L (ref 7–15)
AST SERPL W P-5'-P-CCNC: 129 U/L (ref 0–45)
AUER BODIES BLD QL SMEAR: NORMAL
BASO STIPL BLD QL SMEAR: NORMAL
BILIRUB SERPL-MCNC: 1.1 MG/DL
BITE CELLS BLD QL SMEAR: NORMAL
BLISTER CELLS BLD QL SMEAR: NORMAL
BUN SERPL-MCNC: 39.2 MG/DL (ref 8–23)
BURR CELLS BLD QL SMEAR: NORMAL
CALCIUM SERPL-MCNC: 7.4 MG/DL (ref 8.8–10.2)
CHLORIDE SERPL-SCNC: 112 MMOL/L (ref 98–107)
CK SERPL-CCNC: 2134 U/L (ref 39–308)
CREAT SERPL-MCNC: 1.55 MG/DL (ref 0.67–1.17)
DACRYOCYTES BLD QL SMEAR: NORMAL
DEPRECATED HCO3 PLAS-SCNC: 21 MMOL/L (ref 22–29)
EGFRCR SERPLBLD CKD-EPI 2021: 47 ML/MIN/1.73M2
ELLIPTOCYTES BLD QL SMEAR: NORMAL
ERYTHROCYTE [DISTWIDTH] IN BLOOD BY AUTOMATED COUNT: 14 % (ref 10–15)
FRAGMENTS BLD QL SMEAR: NORMAL
GIANT PLATELETS BLD QL SMEAR: NORMAL
GLUCOSE BLDC GLUCOMTR-MCNC: 147 MG/DL (ref 70–99)
GLUCOSE SERPL-MCNC: 132 MG/DL (ref 70–99)
HCT VFR BLD AUTO: 25.6 % (ref 40–53)
HGB BLD-MCNC: 8.4 G/DL (ref 13.3–17.7)
HGB C CRYSTALS: NORMAL
HOWELL-JOLLY BOD BLD QL SMEAR: NORMAL
INR PPP: 1.64 (ref 0.85–1.15)
MAGNESIUM SERPL-MCNC: 1.7 MG/DL (ref 1.7–2.3)
MCH RBC QN AUTO: 32.3 PG (ref 26.5–33)
MCHC RBC AUTO-ENTMCNC: 32.8 G/DL (ref 31.5–36.5)
MCV RBC AUTO: 99 FL (ref 78–100)
NEUTS HYPERSEG BLD QL SMEAR: NORMAL
PATH REV: NORMAL
PLAT MORPH BLD: NORMAL
PLATELET # BLD AUTO: 79 10E3/UL (ref 150–450)
POLYCHROMASIA BLD QL SMEAR: NORMAL
POTASSIUM SERPL-SCNC: 4.2 MMOL/L (ref 3.4–5.3)
PROT SERPL-MCNC: 5 G/DL (ref 6.4–8.3)
RBC # BLD AUTO: 2.6 10E6/UL (ref 4.4–5.9)
RBC AGGLUT BLD QL: NORMAL
RBC MORPH BLD: NORMAL
ROULEAUX BLD QL SMEAR: NORMAL
SICKLE CELLS BLD QL SMEAR: NORMAL
SMUDGE CELLS BLD QL SMEAR: NORMAL
SODIUM SERPL-SCNC: 140 MMOL/L (ref 135–145)
SPHEROCYTES BLD QL SMEAR: NORMAL
STOMATOCYTES BLD QL SMEAR: NORMAL
TARGETS BLD QL SMEAR: NORMAL
TOXIC GRANULES BLD QL SMEAR: NORMAL
VARIANT LYMPHS BLD QL SMEAR: NORMAL
WBC # BLD AUTO: 4.3 10E3/UL (ref 4–11)

## 2024-05-22 PROCEDURE — 88313 SPECIAL STAINS GROUP 2: CPT | Performed by: PATHOLOGY

## 2024-05-22 PROCEDURE — 999N000111 HC STATISTIC OT IP EVAL DEFER: Performed by: SPECIALIST/TECHNOLOGIST

## 2024-05-22 PROCEDURE — 88185 FLOWCYTOMETRY/TC ADD-ON: CPT | Performed by: ORTHOPAEDIC SURGERY

## 2024-05-22 PROCEDURE — 88237 TISSUE CULTURE BONE MARROW: CPT | Performed by: ORTHOPAEDIC SURGERY

## 2024-05-22 PROCEDURE — 88264 CHROMOSOME ANALYSIS 20-25: CPT | Performed by: ORTHOPAEDIC SURGERY

## 2024-05-22 PROCEDURE — 250N000013 HC RX MED GY IP 250 OP 250 PS 637: Performed by: NURSE PRACTITIONER

## 2024-05-22 PROCEDURE — 88280 CHROMOSOME KARYOTYPE STUDY: CPT | Performed by: ORTHOPAEDIC SURGERY

## 2024-05-22 PROCEDURE — 36415 COLL VENOUS BLD VENIPUNCTURE: CPT | Performed by: NURSE PRACTITIONER

## 2024-05-22 PROCEDURE — 83735 ASSAY OF MAGNESIUM: CPT | Performed by: NURSE PRACTITIONER

## 2024-05-22 PROCEDURE — 85027 COMPLETE CBC AUTOMATED: CPT | Performed by: NURSE PRACTITIONER

## 2024-05-22 PROCEDURE — 250N000013 HC RX MED GY IP 250 OP 250 PS 637: Performed by: PHYSICIAN ASSISTANT

## 2024-05-22 PROCEDURE — 07DR3ZX EXTRACTION OF ILIAC BONE MARROW, PERCUTANEOUS APPROACH, DIAGNOSTIC: ICD-10-PCS | Performed by: ORTHOPAEDIC SURGERY

## 2024-05-22 PROCEDURE — 85097 BONE MARROW INTERPRETATION: CPT | Performed by: PATHOLOGY

## 2024-05-22 PROCEDURE — 85610 PROTHROMBIN TIME: CPT | Performed by: NURSE PRACTITIONER

## 2024-05-22 PROCEDURE — 88305 TISSUE EXAM BY PATHOLOGIST: CPT | Performed by: PATHOLOGY

## 2024-05-22 PROCEDURE — 258N000003 HC RX IP 258 OP 636: Performed by: PHYSICIAN ASSISTANT

## 2024-05-22 PROCEDURE — 88184 FLOWCYTOMETRY/ TC 1 MARKER: CPT | Performed by: ORTHOPAEDIC SURGERY

## 2024-05-22 PROCEDURE — 99207 PR NO BILLABLE SERVICE THIS VISIT: CPT | Performed by: NURSE PRACTITIONER

## 2024-05-22 PROCEDURE — 88311 DECALCIFY TISSUE: CPT | Performed by: PATHOLOGY

## 2024-05-22 PROCEDURE — 250N000013 HC RX MED GY IP 250 OP 250 PS 637: Performed by: INTERNAL MEDICINE

## 2024-05-22 PROCEDURE — 99239 HOSP IP/OBS DSCHRG MGMT >30: CPT | Performed by: NURSE PRACTITIONER

## 2024-05-22 PROCEDURE — 80053 COMPREHEN METABOLIC PANEL: CPT | Performed by: NURSE PRACTITIONER

## 2024-05-22 PROCEDURE — 82550 ASSAY OF CK (CPK): CPT | Performed by: NURSE PRACTITIONER

## 2024-05-22 PROCEDURE — 88189 FLOWCYTOMETRY/READ 16 & >: CPT | Mod: GC | Performed by: STUDENT IN AN ORGANIZED HEALTH CARE EDUCATION/TRAINING PROGRAM

## 2024-05-22 RX ORDER — OXYCODONE HYDROCHLORIDE 5 MG/1
5-10 TABLET ORAL EVERY 4 HOURS PRN
Qty: 26 TABLET | Refills: 0 | Status: SHIPPED | OUTPATIENT
Start: 2024-05-22 | End: 2024-05-27

## 2024-05-22 RX ORDER — ACETAMINOPHEN 325 MG/1
650 TABLET ORAL EVERY 4 HOURS PRN
Qty: 100 TABLET | Refills: 0 | Status: SHIPPED | OUTPATIENT
Start: 2024-05-23

## 2024-05-22 RX ORDER — AMOXICILLIN 250 MG
1 CAPSULE ORAL 2 TIMES DAILY PRN
Qty: 30 TABLET | Refills: 1 | Status: SHIPPED | OUTPATIENT
Start: 2024-05-22

## 2024-05-22 RX ORDER — TRAMADOL HYDROCHLORIDE 50 MG/1
50 TABLET ORAL EVERY 6 HOURS PRN
Status: DISCONTINUED | OUTPATIENT
Start: 2024-05-22 | End: 2024-05-22 | Stop reason: HOSPADM

## 2024-05-22 RX ADMIN — SODIUM CHLORIDE: 9 INJECTION, SOLUTION INTRAVENOUS at 09:33

## 2024-05-22 RX ADMIN — OXYCODONE HYDROCHLORIDE 10 MG: 5 TABLET ORAL at 12:51

## 2024-05-22 RX ADMIN — ACETAMINOPHEN 975 MG: 325 TABLET, FILM COATED ORAL at 00:11

## 2024-05-22 RX ADMIN — POLYETHYLENE GLYCOL 3350 17 G: 17 POWDER, FOR SOLUTION ORAL at 08:41

## 2024-05-22 RX ADMIN — ATORVASTATIN CALCIUM 10 MG: 10 TABLET, FILM COATED ORAL at 08:40

## 2024-05-22 RX ADMIN — TIZANIDINE 2 MG: 2 TABLET ORAL at 10:23

## 2024-05-22 RX ADMIN — METOPROLOL SUCCINATE 150 MG: 100 TABLET, EXTENDED RELEASE ORAL at 08:38

## 2024-05-22 RX ADMIN — OXYCODONE HYDROCHLORIDE 5 MG: 5 TABLET ORAL at 08:50

## 2024-05-22 RX ADMIN — FAMOTIDINE 20 MG: 20 TABLET ORAL at 08:41

## 2024-05-22 RX ADMIN — LEVOTHYROXINE SODIUM 25 MCG: 25 TABLET ORAL at 08:38

## 2024-05-22 RX ADMIN — PANTOPRAZOLE SODIUM 40 MG: 40 TABLET, DELAYED RELEASE ORAL at 08:40

## 2024-05-22 RX ADMIN — SENNOSIDES AND DOCUSATE SODIUM 1 TABLET: 8.6; 5 TABLET ORAL at 08:41

## 2024-05-22 RX ADMIN — SODIUM CHLORIDE: 9 INJECTION, SOLUTION INTRAVENOUS at 00:07

## 2024-05-22 RX ADMIN — APIXABAN 2.5 MG: 2.5 TABLET, FILM COATED ORAL at 08:40

## 2024-05-22 RX ADMIN — OXYCODONE HYDROCHLORIDE 5 MG: 5 TABLET ORAL at 00:11

## 2024-05-22 ASSESSMENT — ACTIVITIES OF DAILY LIVING (ADL)
ADLS_ACUITY_SCORE: 27
ADLS_ACUITY_SCORE: 31
ADLS_ACUITY_SCORE: 27
ADLS_ACUITY_SCORE: 31
ADLS_ACUITY_SCORE: 27
ADLS_ACUITY_SCORE: 27
ADLS_ACUITY_SCORE: 31
ADLS_ACUITY_SCORE: 27
ADLS_ACUITY_SCORE: 31
ADLS_ACUITY_SCORE: 27
ADLS_ACUITY_SCORE: 27

## 2024-05-22 NOTE — PLAN OF CARE
Problem: Adult Inpatient Plan of Care  Goal: Plan of Care Review  Description: The Plan of Care Review/Shift note should be completed every shift.  The Outcome Evaluation is a brief statement about your assessment that the patient is improving, declining, or no change.  This information will be displayed automatically on your shift  note.  Outcome: Progressing  Goal: Absence of Hospital-Acquired Illness or Injury  Outcome: Progressing  Intervention: Identify and Manage Fall Risk  Recent Flowsheet Documentation  Taken 5/22/2024 0200 by Clark Middleton RN  Safety Promotion/Fall Prevention:   activity supervised   clutter free environment maintained   nonskid shoes/slippers when out of bed   room door open   room near nurse's station   safety round/check completed   supervised activity  Taken 5/21/2024 2100 by Clark Middleton RN  Safety Promotion/Fall Prevention:   activity supervised   clutter free environment maintained   nonskid shoes/slippers when out of bed   room door open   room near nurse's station   safety round/check completed   supervised activity  Intervention: Prevent Skin Injury  Recent Flowsheet Documentation  Taken 5/22/2024 0200 by Clark Middleton RN  Device Skin Pressure Protection: pressure points protected  Taken 5/21/2024 2100 by Clark Middleton RN  Device Skin Pressure Protection: pressure points protected  Intervention: Prevent and Manage VTE (Venous Thromboembolism) Risk  Recent Flowsheet Documentation  Taken 5/22/2024 0200 by Clark Middleton RN  VTE Prevention/Management: SCDs (sequential compression devices) on  Taken 5/21/2024 2100 by Clark Middleton RN  VTE Prevention/Management: SCDs (sequential compression devices) on  Intervention: Prevent Infection  Recent Flowsheet Documentation  Taken 5/22/2024 0200 by Clark Middleton RN  Infection Prevention: rest/sleep promoted  Taken 5/21/2024 2100 by Clark Middleton RN  Infection Prevention: rest/sleep promoted  Goal: Optimal Comfort and  Wellbeing  Outcome: Progressing  Intervention: Monitor Pain and Promote Comfort  Recent Flowsheet Documentation  Taken 5/22/2024 0000 by Clark Middleton RN  Pain Management Interventions: medication (see MAR)  Goal: Readiness for Transition of Care  Outcome: Progressing     Problem: Risk for Delirium  Goal: Optimal Coping  Outcome: Progressing  Intervention: Optimize Psychosocial Adjustment to Delirium  Recent Flowsheet Documentation  Taken 5/22/2024 0200 by Clark Middleton RN  Supportive Measures: active listening utilized  Taken 5/21/2024 2100 by Clark Middleton RN  Supportive Measures: active listening utilized  Goal: Improved Behavioral Control  Outcome: Progressing  Intervention: Minimize Safety Risk  Recent Flowsheet Documentation  Taken 5/22/2024 0200 by Clark Middleton RN  Communication Enhancement Strategies: call light answered in person  Enhanced Safety Measures:   pain management   review medications for side effects with activity   room near unit station  Taken 5/21/2024 2100 by Clark Middleton RN  Communication Enhancement Strategies: call light answered in person  Enhanced Safety Measures:   pain management   review medications for side effects with activity   room near unit station  Goal: Improved Attention and Thought Clarity  Outcome: Progressing  Intervention: Maximize Cognitive Function  Recent Flowsheet Documentation  Taken 5/22/2024 0200 by Clark Middleton RN  Reorientation Measures: clock in view  Taken 5/21/2024 2100 by Clark Middleton RN  Reorientation Measures: clock in view  Goal: Improved Sleep  Outcome: Progressing     Problem: Hip Fracture Medical Management  Goal: Optimal Coping with Change in Health Status  Outcome: Progressing  Intervention: Support Psychosocial Response to Injury  Recent Flowsheet Documentation  Taken 5/22/2024 0200 by Clark Middleton RN  Supportive Measures: active listening utilized  Taken 5/21/2024 2100 by Clark Middleton RN  Supportive Measures: active listening  utilized  Goal: Absence of Bleeding  Outcome: Progressing  Goal: Effective Bowel Elimination  Outcome: Progressing  Goal: Baseline Cognitive Function Maintained  Outcome: Progressing  Intervention: Maximize Cognitive Function  Recent Flowsheet Documentation  Taken 5/22/2024 0200 by Clark Middleton RN  Reorientation Measures: clock in view  Taken 5/21/2024 2100 by Clark Middleton RN  Reorientation Measures: clock in view  Goal: Absence of Embolism  Outcome: Progressing  Intervention: Prevent or Manage Embolism Risk  Recent Flowsheet Documentation  Taken 5/22/2024 0200 by Clark Middleton RN  VTE Prevention/Management: SCDs (sequential compression devices) on  Taken 5/21/2024 2100 by Clark Middleton RN  VTE Prevention/Management: SCDs (sequential compression devices) on  Goal: Fracture Stability  Outcome: Progressing  Goal: Optimal Functional Performance  Outcome: Progressing  Intervention: Promote Optimal Functional Status  Recent Flowsheet Documentation  Taken 5/22/2024 0200 by Clark Middleton RN  Activity Management: activity adjusted per tolerance  Taken 5/21/2024 2100 by Clark Middleton RN  Activity Management: activity adjusted per tolerance  Goal: Pain Control and Function  Outcome: Progressing  Intervention: Manage Acute Orthopaedic-Related Pain  Recent Flowsheet Documentation  Taken 5/22/2024 0000 by Clark Middleton RN  Pain Management Interventions: medication (see MAR)  Goal: Effective Urinary Elimination  Outcome: Progressing       Pt remains alert and oriented, oxycodone given once for pain at the surgical site, he has been able to rest, CMS remains in tact, Pt has been voiding in moderate to small amounts, Vitals have been stable.   Pt has some concerns about going to TCU and would like to speak with the .   Will continue to assess and treat pain as needed and able, and follow plan of care.

## 2024-05-22 NOTE — PLAN OF CARE
Goal Outcome Evaluation:      Plan of Care Reviewed With: patient    Overall Patient Progress: improvingOverall Patient Progress: improving    Outcome Evaluation: Pt is A&Ox4. VSS on RA. Pt is up with 2A, gb and joi steady and was up to chair x1 this shift. Pt did stand up at bedside with 2A, gb and walker. Pt is partial weight bearing on left leg. Bone marrow biopsy was done at bedside by Dr Wagner. Pt reports pain as higher today and is using oxycodone and zanaflex for pain managment. Pt declined wanting tylenol. Ice and repositioning has been utilized as well. Pt has clear lung sounds. Tolerating diet without nausea or GI upset. Bowel sounds are active throughout and pt is passing flatus. Last bowel movemement was 5/19. Plan is for pt to discharge to [facility] for TCU care. Pt is in agreement with this and plan to discharge around 1330 via shutran. Nurse to Nurse report given to Anton at [facility].

## 2024-05-22 NOTE — PLAN OF CARE
Physical Therapy Discharge Summary    Reason for therapy discharge:    Discharged to transitional care facility.    Progress towards therapy goal(s). See goals on Care Plan in [facility] electronic health record for goal details.  Goals partially met.  Barriers to achieving goals:   limited tolerance for therapy and discharge from facility.    Therapy recommendation(s):    Continued therapy is recommended.  Rationale/Recommendations:   .Patient would benefit from continued skilled therapeutic intervention in order to progress them towards a higher level of function in accordance with their surgeon's protocol.        Thank you for your referral.  Concepcion Lloyd, PT, DPT, ATC, [facility]  O: [phone]  E: [email or web address]

## 2024-05-22 NOTE — PROGRESS NOTES
Doing well after fixation L hip fracture.  A little more pain today.  Some drainage/sanguinous from Left hip drain site after removal.    L iliac crest bone marrow aspirate:  Sterile prep with chloroprep  Injected lidocaine/marcaine  Prepped again   Jam-Tari needle used for bone marrow aspirate placed L iliac crest without complications/difficulty  3 10 ml syringes of material removed and placed in appropriate tubes  Dressing placed  No complications or issues    [facility]

## 2024-05-22 NOTE — PROGRESS NOTES
S-(situation): Patient discharged to [facility] via wheelchair with jaye    B-(background): Post fall with left hip fracture and surgical repair.    A-(assessment): Pt is A&Ox4. VSS on RA. Pt is up with 2A, gb and joi steady. Pt is partial weight bearing on left leg . Pt reports pain as higher today and is using oxycodone and zanaflex for pain managment. Pt declined wanting tylenol. Ice and repositioning has been utilized as well. Pt has clear lung sounds. Tolerating diet without nausea or GI upset. Bowel sounds are active throughout and pt is passing flatus. Last bowel movemement was 5/19. Plan is for pt to discharge to [facility] for TCU care. Pt is in agreement with this and plan to discharge around 1330 via shutran. Nurse to Nurse report given to Anton at [facility].   Last bowel movement: 05/19/24     R-(recommendations):Report called to Anton. Listed belongings gathered and sent with patient.     Discharge Nursing Criteria:     Care Plan and Patient education resolved: Yes    Vaccines  Influenza status verified at discharge:  Yes        MISC  Home medications returned to patient: NA  Medication Bin checked and emptied on discharge Yes  All paperwork sent with patient/Copy of AVS given to patient or family Yes.  Sent with pt

## 2024-05-22 NOTE — PLAN OF CARE
Occupational Therapy: Orders received. Chart reviewed and discussed with care team.? Occupational Therapy not indicated due to: patient set to discharge to TCU this afternoon, IP OT involvement at this time does not change or alter discharge disposition.? Defer discharge recommendations to care team.? Will complete orders.        Thank you for your referral.  SHU Pepe/JEFF     [facility]  O: [phone]  E: [email or web address]

## 2024-05-22 NOTE — PROGRESS NOTES
[facility]    Medicine Progress Note - Hospitalist Service    Date of Admission:  5/19/2024    Assessment & Plan   Fall from standing, initial encounter   Intertrochanteric fracture of left femur, closed, initial encounter (H)  sustained a ground-level fall in his yard.   Patient indicated that he was outside working in his yard breaking up pallets, when he tripped over one of the wood pallets.  The patient lives alone,and  said that he was  lying on the ground for about 3 hours until  a neighbor was able to come over and help him,   with calling EMS.   He was uncertain if he hit his head,  head CT shows not acute changes.  Denied any loss of consciousness.  X-ray of pelvis and left hip showed fracture through the femur at the intertrochanteric/subtrochanteric junction.  Orthopedics consulted in the ED and surgery is planned today    Plan:   -POD 1 s/p left hip fx repair.  Hemodynamically stable.  Platelets 89,000 today.    -Will need TCU at discharge  -Is also receiving a bone marrow biopsy today (as coordinated by Jaylen/onc/ortho).  Will re-evaluate his readiness for TCU tomorrow        Elevated CK  On admission CK elevated at 372.  Patient found down after being on the ground for several hours.  CK 3001 on 5/21 although he was post op day one.  CK 2134 on 5/22.   He will be transferring to TCU today, will order repeat labs at follow up there.     Anemia - chronic  Chronic Thrombocytopenia  Followed by Dr. Jim cooper/onc, was supposed to be seen, but didn't finishe visit on 5/17.    Hemoglobin and platelets stable since April 2024.  Dr. Fernandez recommending further evaluation with a bone marrow biopsy (which is reportedly being done by ortho on 5/22 am ) and further lab work.  Baseline platelets in the 50's, hemoglobin down 8.4 from 10 on 5/21 however he was receiving crystalloid at 100 overnight.  Patient denies history of  bleeding issues.  -Dr. Wagner, reportedly obtained bone marrow  biopsy on 5/22  -Follow-up with heme-onc as outpatient     Hypertrophic cardiomyopathy  Echocardiogram 12/2023 showing global and regional left ventricular function is hyperkinetic with an EF of greater than 70%.  Basal septum 23 mm.  Mild EDGAR of anterior mitral leaflet.  LVOT gradient not assessed.  Concern for hypertrophic cardiomyopathy.  Right ventricular function size wall motion and thickness were normal.  Patient had an abnormal stress MRI 12/14/2023 which showed asymmetric septal hypertrophy.  Patient also had Zio patch which showed no sustained arrhythmias to account for symptoms of dyspnea on exertion.  Symptoms included dyspnea on exertion.  1/08 patient saw Dr. Killian, cardiology [facility].  1/19 had an elective coronary angiogram which ruled out epicardial disease and LVOT tract obstruction, normal Coronary arteries and NVED not elevated. No further follow-up with cardiology  -Continue metoprolol  -Follow-up with cardiology     DM type 2  Hemoglobin A1c 5.5.  Prior to admission metformin  -resume metformin   -Monitor blood glucose     HTN  Home medications include hydrochlorothiazide, metoprolol, spironolactone, valsartan.  Patient has been normotensive  -Holding hydrochlorothiazide perioperatively  -resume spironolactone and valsartan  -Continue metoprolol     CKD stage 3  Baseline creatinine 1.2-1.3.  Cr 1.55 on 5/22.  -avoid nephrotoxic medications  -Monitor basic metabolic panel daily     Chronic hepatitis C  Liver cirrhosis secondary to NAFLD  Portal HTN  Chronic thrombus of SMV and portal vein  Splenomegaly  Elevated INR  Diagnosed with cirrhosis 5/2008 via liver biopsy, hepatitis C diagnosed in 2004.  Per EGD 5/23 showed mild portal hypertension gastropathy and gastric cardia gastric fundus and gastric body.  Last seen by Dr. Renteria GI April 2023 and liver disease was compensated at that time.    INR elevated at 1.64 on 5/22.  Alk phos 89, ALT 27, , total bilirubin 1.1 on 5/22  -Monitor  "liver function daily  -repeat labs outpatient in a few days  -follow up with GI as outpatient     Obstructive sleep apnea  Patient reports he does have obstructive sleep apnea but has never used CPAP and never will     Hypothyroidism  4/16 TSH 5.38, free T41.13  -Continue home dose of levothyroxine     Former smoker  Patient reports only smoked for about 5 years and quit many many years ago          Diet: Advance Diet as Tolerated: Regular Diet Adult    DVT Prophylaxis: DOAC  Rankin Catheter: Not present  Lines: None     Cardiac Monitoring: None  Code Status: Full Code      Clinically Significant Risk Factors              # Hypoalbuminemia: Lowest albumin = 2.7 g/dL at 5/22/2024  5:59 AM, will monitor as appropriate  # Coagulation Defect: INR = 1.64 (Ref range: 0.85 - 1.15) and/or PTT = N/A, will monitor for bleeding  # Thrombocytopenia: Lowest platelets = 79 in last 2 days, will monitor for bleeding   # Hypertension: Noted on problem list        # Obesity: Estimated body mass index is 34.47 kg/m  as calculated from the following:    Height as of 5/17/24: 1.803 m (5' 10.98\").    Weight as of 5/17/24: 112 kg (247 lb)., PRESENT ON ADMISSION     # Financial/Environmental Concerns: none         Disposition Plan     Medically Ready for Discharge: Anticipated Today           The patient's care was discussed with the  entire care team .    Kaylen Rojas CNP  Hospitalist Service  [facility]  Securely message with [employer] (more info)  Text page via [employer] Paging/Directory   ______________________________________________________________________    Interval History   NO acute events overnight    Physical Exam   Vital Signs: Temp: 99.5  F (37.5  C) Temp src: Oral BP: (!) 158/58 Pulse: 72   Resp: 20 SpO2: 95 % O2 Device: None (Room air) Oxygen Delivery: 2 LPM  Weight: 0 lbs 0 oz    Gen:  Lying in bed no acute distress  HEENT:  normocephalic, atraumatic, oropharynx clear  Resp:  CTA posteriorly, normal " respiratory effort  Card:  S1,S2, RRR no murmur, rub or gallop  Skin;  Left hip incision covered, no drainage  Abd:  Soft, nonistended, non tender,  normoactive bowel sounds   Neuro:  Neuro exam non focal      Medical Decision Making       45 MINUTES SPENT BY ME on the date of service doing chart review, history, exam, documentation & further activities per the note.        Data     I have personally reviewed the following data over the past 24 hrs:    4.3  \   8.4 (L)   / 79 (L)     140 112 (H) 39.2 (H) /  132 (H)   4.2 21 (L) 1.55 (H) \     ALT: 27 AST: 129 (H) AP: 89 TBILI: 1.1   ALB: 2.7 (L) TOT PROTEIN: 5.0 (L) LIPASE: N/A     INR:  1.64 (H) PTT:  N/A   D-dimer:  N/A Fibrinogen:  N/A       Imaging results reviewed over the past 24 hrs:   No results found for this or any previous visit (from the past 24 hour(s)).

## 2024-05-22 NOTE — PROGRESS NOTES
Care Management Discharge Note    Discharge Date:  05/22/24       Discharge Disposition: Transitional Care    Discharge Services: Transportation Services- Ziyadu De Luna at 1330 today    Discharge DME: None    Discharge Transportation: agency    Private pay costs discussed: transportation costs    Does the patient's insurance plan have a 3 day qualifying hospital stay waiver?  Yes     Which insurance plan 3 day waiver is available? Alternative insurance waiver    Will the waiver be used for post-acute placement? Yes    PAS Confirmation Code:  [id number]    Patient/family educated on Medicare website which has current facility and service quality ratings: yes    Education Provided on the Discharge Plan: Yes    Persons Notified of Discharge Plans: PatientAnton in admissions at [facility] include 234 beds at the [facility].     Patient/Family in Agreement with the Plan: yes    Handoff Referral Completed: Yes    Additional Information:  Per provider, patient is medically ready to discharge to the [facility] (Phone: [phone] Fax: [phone]) today.      Writer has visited with patient and discussed that Nicole De Luna transportation is available at 1:30 pm today for a wheelchair van transport to [place].  Writer explained the cost is $56.00 and Nicole De Luna will be calling him directly for payment by credit or debit card.  Patient in agreement and has his credit card with him.      Patient in agreement with discharge to [place] TCU today.  Patient has called his friend, Cesar, to discuss Cesar bringing patient more clothes to have at the TCU.      PAS-RR    Per DHS regulation, CTS team completed and submitted PAS-RR to [place] Board on Aging Direct Connect via the Senior Link[employer] Line. CTS team advised SNF and they are aware a PAS-RR has been submitted.     CTS team reviewed with pt or health care agent that they may be contacted for a follow up appointment within 10 days of hospital discharge if SNF stay is less than 30 days. Contact information for  Senior [employer] Line was also provided.     Pt or health care agent verbalized understanding.     PAS-RR # [id number]     NICHOLE Ray  [facility]   [phone]

## 2024-05-22 NOTE — PROGRESS NOTES
SPIRITUAL HEALTH SERVICES Progress Note    Medical Surgical Unit at [facility].      REFERRAL SOURCE/REASON FOR VISIT - Canelo is Amish.    SUMMARY - One of our Eucharistic Ministers stopped in to offer Holy Communion to patient.  He declined, however.         Plan - Eucharistic Ministers will continue to follow patient to offer Holy Communion until his discharge.     Familia Ramos, Ph.D, [facility]  [address] [place] [address] [phone]  [email or web address]    Assessment -     Patient/Family Understanding of Illness and Goals of Care - Patient is Amish.

## 2024-05-22 NOTE — PROGRESS NOTES
"[facility]    Progress note    73 year old male POD#2 s/p left Hip open reduction internal fixation with TFN nail by Dr. Wagner   S: Patient seen at bedside in no apparent distress, alert and pleasant.  I discussed surgery and rehabilitation with the patient.  He denies numbness, tingling or major pain issues.  He has been up and standing but has not done a lot of walking.  I encouraged him to do this today.  He is waiting on transitional care placement.  I told him we do not have much of a reason to keep him when he said he wanted to stay until Friday.  We are just waiting on transitional care.  Discussed place to stay with less steps after transitional care.  This might benefit him.  Discussed drain.  Discussed drain site.  O: CMS intact left LE, DF/PF intact, 5 out of 5 strength       Aquacel dressings on, clean and dry with a good seal, no shadowing       Hemovac drain intact.  Put out 80 cc yesterday.  Drain pulled by myself.  Dressing applied.       Left hip with minimal swelling and no erythema or ecchymosis        Bilateral calves soft and non tender    Vital signs:  Temp: 98.8  F (37.1  C) Temp src: Oral BP: 119/46 Pulse: 67   Resp: 16 SpO2: 92 % O2 Device: None (Room air) Oxygen Delivery: 2 LPM      Estimated body mass index is 34.47 kg/m  as calculated from the following:    Height as of 5/17/24: 1.803 m (5' 10.98\").    Weight as of 5/17/24: 112 kg (247 lb).      Hemoglobin   Date Value Ref Range Status   05/21/2024 10.0 (L) 13.3 - 17.7 g/dL Final   04/05/2021 14.1 13.3 - 17.7 g/dL Final     INR   Date Value Ref Range Status   05/20/2024 1.35 (H) 0.85 - 1.15 Final   04/05/2021 1.14 0.86 - 1.14 Final         A/P:  1. Pain-controlled with Tylenol, oxycodone 5 mg, Zanaflex yesterday.   2. DVT Prophylaxis-Eliquis 2.5mg BID x 6 weeks, then every day x 1 year.    3. Weight Bearing Status-partial weightbearing left lower extremity no more than 20 pounds x 6 weeks  4. Hip " Precautions-none  5. Physical Therapy-recommending transitional care  6. Occupational Therapy-see patient if appropriate, ADLs  7. Case Management-Referral sent to Patrick Cid and Guardian nAgie.  8. Hospitalist-discussed case with JIMY Rojas yesterday.  Secure message sent to JIMY Goodwin this morning.  9. Incision-no signs or symptoms of infection  10. Drain-Hemovac drain 80 mL yesterday.  Drain pulled by myself this a.m.  11. Plan-anticipate possible discharge to transitional care today pending placement and hospitalist clearance follow-up in 2 weeks with ISA Henson.  Orthopedic discharge orders are in.  Plan discussed with RN, secure message sent to charge RN and hospitalist.  Message sent to Dr. Wagner.    Rafa Henson PA-C  5/22/2024

## 2024-05-22 NOTE — OP NOTE
Preop Dx:  High subtrochanteric fracture L proximal femur  Postop Dx:  same  Procedure: Open reduction, cerclage fixation fracture along with cephalomedullary Internal fixation with lag screw and interlocking screw.    Attending:  Kristy  Assist:  Gonzales Henson PA-C for help with retraction/positioning and closure    Indications for procedure:  patient states he has had a number of falls and sustained fracture with the most recent.  Hx of hep C and liver disease.    Findings at procedure:  High Sub troch fracture, reduced well with cerclage cable for provisional fixation and limited open reduction.  We used Synthes 12 x 235 intermediate TFN with 105 lag screw and a distal locking screw without apparent complication.  Intra-op and postop images showed anatomic orientation of fracture fragments and adequate orientation of components.    Procedure:  Taken to OR 1 for GET anesthesia, placed on Bradley table with attempted reduction closed of L hip fracture.  After sterile prep and drape we opened the fracture site and placed cerclage cable/synthes with some difficulty due to patient's size.  We then used the fracture table distraction and the cerclage cable for reduction which helped to some degree.  We then opened the proximal site for oliver placement but first used this with another cable and manual reduction using synthes ball tipped spike to hold fracture in reduction.  Once this was reduced we attempted percutaneous stabilization without success then went to the proximal temporary cable and finally placed the guide pin with fracture reduced.  We held fracture in reduction as we reamed proximally the 16 mm opening to level of lesser trochanter after multiple imaging.  We then placed the intramedullary guide wire, held fracture in reduction and reamed sequentially to 13 placing a 12 x 130 degree x 235 TFN.  We corrected for adequate anteversion and seated well. We then drilled guide pin followed by lateral cortical reamer and  finally formal reamer over guide pin all the while holding fracture reduced.  We also had fully seated the lateral cortex guide to help hold fracture in reduction.  We placed 105 lag screw, secured the intramedullary interference screw for lag screw and used outrigger to place distal screw without issue.  Electrocautery was utilized for hemostasis with copious irrigation throughout case.  Platelets were given preop which seemed to help during case.  We then closed wound after placing medium hemovac into intramedullary oliver cephalad.  Wound closed with #1 vicryl in running fashion for IT band  and deep fascia in the 2 wounds.  Followed by #1 vicryl/ 0 Vicryl in interrupted fashion for deep and intermediate layers.  2-0 vicryl for superficial layers and 3-0 monocryl in running subcuticular fashion for epithelium.  Dermabond, xeroform/ aquacel applied and 1/4 plain marcaine injected.  No complications identified.    Postop images showed anatomic orientation fracture and adequate placement hardware.

## 2024-05-23 ENCOUNTER — PATIENT OUTREACH (OUTPATIENT)
Dept: CARE COORDINATION | Facility: CLINIC | Age: 74
End: 2024-05-23
Payer: COMMERCIAL

## 2024-05-23 ASSESSMENT — ACTIVITIES OF DAILY LIVING (ADL): DEPENDENT_IADLS:: INDEPENDENT

## 2024-05-23 NOTE — PROGRESS NOTES
Clinic Care Coordination Contact  Care Coordination Transition Communication    Clinical Data: Patient was hospitalized at [facility]  from 5/19/2024  to  5/22/2024 with diagnosis of a fall resulting in a femur fx    Assessment: Patient has transitioned to TCU/ARU for short term rehabilitation:    Facility Name: [facility]   Transition Communication:  Notified facility of Primary Care- Care Coordination left message for Department of  [phone]    Plan: Care Coordinator will await notification from facility staff informing of patient's discharge plans/needs. Care Coordinator will review chart and outreach to facility staff every 4 weeks and as needed.     LINUS BrockW, MSW   [facility]  Care Coordination  [facility] and [facility]  [phone]  5/23/2024 1:13 PM

## 2024-05-23 NOTE — DISCHARGE SUMMARY
"[facility]  Hospitalist Discharge Summary      Date of Admission:  5/19/2024  Date of Discharge:  5/22/2024  1:30 PM  Discharging Provider: Kaylen Rojas CNP  Discharge Service: Hospitalist Service    Clinically Significant Risk Factors     # Obesity: Estimated body mass index is 34.47 kg/m  as calculated from the following:    Height as of 5/17/24: 1.803 m (5' 10.98\").    Weight as of 5/17/24: 112 kg (247 lb).       Follow-ups Needed After Discharge   Follow-up Appointments     Follow Up Care      Follow-up with your Surgeon Team/ISA Henson in 2 weeks for wound check,   we will get AP and lateral of the left hip at that time         Follow up with detention physician.  The following labs/tests are   recommended: INR/CK total, LFT's, Comprehensive metabolic panel on   5/24/2024.                Discharge Disposition   Discharged to tCU  Condition at discharge: Stable    Hospital Course   Fall from standing, initial encounter   Intertrochanteric fracture of left femur, closed, initial encounter (H)  sustained a ground-level fall in his yard.   Patient indicated that he was outside working in his yard breaking up pallets, when he tripped over one of the wood pallets.  The patient lives alone,and  said that he was  lying on the ground for about 3 hours until  a neighbor was able to come over and help him,   with calling EMS.   He was uncertain if he hit his head,  head CT shows not acute changes.  Denied any loss of consciousness.  X-ray of pelvis and left hip showed fracture through the femur at the intertrochanteric/subtrochanteric junction.  Orthopedics consulted in the ED and surgery is planned today    POD 2 s/p left hip fx repair.  Hemodynamically stable.  Platelets at baseline.  Discharging to TCU for further therapy.    Elevated CK  On admission CK elevated at 372.  Patient found down after being on the ground for several hours.  CK 3001 on 5/21 although he was post op day one.  " CK 2134 on 5/22.   He will be transferring to TCU today, will order repeat labs at follow up there.     Anemia - chronic  Chronic Thrombocytopenia  Followed by Dr. Jim cooper/onc, was supposed to be seen, but didn't finishe visit on 5/17.    Hemoglobin and platelets stable since April 2024.  Dr. Fernandez recommending further evaluation with a bone marrow biopsy (which is reportedly being done by ortho on 5/22 am ) and further lab work.  Baseline platelets in the 50's, hemoglobin down 8.4 from 10 on 5/21 however he was receiving crystalloid at 100 overnight.  Patient denied history of  bleeding issues.  -Dr. Wagner, reportedly obtained bone marrow biopsy on 5/22  -Follow-up with heme-onc as outpatient     Hypertrophic cardiomyopathy  Echocardiogram 12/2023 showing global and regional left ventricular function is hyperkinetic with an EF of greater than 70%.  Basal septum 23 mm.  Mild EDGAR of anterior mitral leaflet.  LVOT gradient not assessed.  Concern for hypertrophic cardiomyopathy.  Right ventricular function size wall motion and thickness were normal.  Patient had an abnormal stress MRI 12/14/2023 which showed asymmetric septal hypertrophy.  Patient also had Zio patch which showed no sustained arrhythmias to account for symptoms of dyspnea on exertion.  Symptoms included dyspnea on exertion.  1/08 patient saw Dr. Killian, cardiology [facility].  1/19 had an elective coronary angiogram which ruled out epicardial disease and LVOT tract obstruction, normal Coronary arteries and NVED not elevated. No further follow-up with cardiology  -Continue metoprolol  -Follow-up with cardiology     DM type 2  Hemoglobin A1c 5.5.  Prior to admission metformin  -resume metformin   -Monitor blood glucose     HTN  Home medications include hydrochlorothiazide, metoprolol, spironolactone, valsartan.  Patient has been normotensive  Resume home regimen at discharge.     CKD stage 3  Baseline creatinine 1.2-1.3.  Cr 1.55 on 5/22.        Chronic hepatitis C  Liver cirrhosis secondary to NAFLD  Portal HTN  Chronic thrombus of SMV and portal vein  Splenomegaly  Elevated INR  Diagnosed with cirrhosis 5/2008 via liver biopsy, hepatitis C diagnosed in 2004.  Per EGD 5/23 showed mild portal hypertension gastropathy and gastric cardia gastric fundus and gastric body.  Last seen by Dr. Dexter BETANCUR April 2023 and liver disease was compensated at that time.    INR elevated at 1.64 on 5/22.  Alk phos 89, ALT 27, , total bilirubin 1.1 on 5/22  Check liver studies outpatient and follow up as needed.     Obstructive sleep apnea  Patient reports he does have obstructive sleep apnea but has never used CPAP and never will     Hypothyroidism  4/16 TSH 5.38, free T41.13.  Continue home regimen at discharge.     Former smoker  Patient reports only smoked for about 5 years and quit many many years ago    Consultations This Hospital Stay   ORTHOPEDIC SURGERY IP CONSULT  PHYSICAL THERAPY ADULT IP CONSULT  OCCUPATIONAL THERAPY ADULT IP CONSULT  CARE MANAGEMENT / SOCIAL WORK IP CONSULT  PHYSICAL THERAPY ADULT IP CONSULT  OCCUPATIONAL THERAPY ADULT IP CONSULT    Code Status   Prior    Time Spent on this Encounter   I, Kaylen Rojas CNP, personally saw the patient today and spent greater than 30 minutes discharging this patient.       Kaylen Rojas CNP  [facility]  [address] [place] [address]  Phone: [phone]  ______________________________________________________________________    Physical Exam   Vital Signs: Temp: 98.2  F (36.8  C) Temp src: Oral BP: 125/43 Pulse: 67   Resp: 16 SpO2: 98 % O2 Device: None (Room air)    Weight: 0 lbs 0 oz    Gen:  Lying in bed no acute distress  HEENT:  normocephalic, atraumatic, oropharynx clear  Resp:  CTA posteriorly, normal respiratory effort  Card:  S1,S2, RRR no murmur, rub or gallop  Abd:  Soft nondistended, non tender,  normoactive bowel sounds   Skin:  left hip incision  "covered, +cms  Neuro:  Neuro exam non focal       Primary Care Physician   Richi Jaramillo    Discharge Orders      Primary Care - Care Coordination Referral      Follow Up and recommended labs and tests    Follow up with [facility] physician.  The following labs/tests are recommended: INR/CK total, LFT's, Comprehensive metabolic panel on 5/24/2024.     General info for SNF    Length of Stay Estimate: Short Term Care: Estimated # of Days <30 Condition at Discharge: Stable Level of care:skilled  Rehabilitation Potential: Good Admission H&P remains valid and up-to-date: Yes Recent Chemotherapy: N/A Use Nursing Home Standing Orders: Yes     Mantoux Instructions    Give two-step Mantoux (PPD) Per Facility Policy: Yes     Incentive Spirometry    Incentive Spirometry 10 times per hour, 4 times per day.     Shower with wound/dressing NOT covered    You do not need to cover your dressing or incision in the shower, you may allow water and soap to run over top of the surgical dressing or incision. You may shower 0 days after surgery.  You are strictly prohibited from soaking or submerging the surgical wound underwater.     Reason for your hospital stay    Hip Fracture s/p Hip Surgery     When to call - Contact Surgeon Team    You may experience symptoms that require follow-up before your scheduled appointment. Refer to the \"[employer] Tool\" for instructions on when to contact your Surgeon Team if you are concerned about pain control, blood clots, constipation, or if you are unable to urinate.     When to call - Reach out to Urgent Care    If you are not able to reach your Surgeon Team and you need immediate care, go to the Orthopedic Walk-in Clinic or Urgent Care at your Surgeon's office.  Do NOT go to the Emergency Room unless you have shortness of breath, chest pain, or other signs of a medical emergency.     When to call - Reasons to Call 911    Call 911 immediately if you experience sudden-onset chest pain, arm " weakness/numbness, slurred speech, or shortness of breath     Symptoms - Fever Management    A low grade fever can be expected after surgery.  Use acetaminophen (TYLENOL) as needed for fever management.  Contact your Surgeon Team if you have a fever greater than 101.5 F, chills, and/or night sweats.     Symptoms - Constipation management    Constipation (hard, dry bowel movements) is expected after surgery due to the combination of being less active, the anesthetic, and the opioid pain medication.  You can do the following to help reduce constipation:  ~  FLUIDS:  Drink clear liquids (water or Gatorade), or juice (apple/prune).  ~  DIET:  Eat a fiber rich diet.    ~  ACTIVITY:  Get up and move around several times a day.  Increase your activity as you are able.  MEDICATIONS:  Reduce the risk of constipation by starting medications before you are constipated.  You can take Miralax   (1 packet as directed) and/or a stool softener (Senokot 1-2 tablets 1-2 times a day).  If you already have constipation and these medications are not working, you can get magnesium citrate and use as directed.  If you continue to have constipation you can try an over the counter suppository or enema.  Call your Surgeon Team if it has been greater than 3 days since your last bowel movement.     Symptoms - Reduced Urine Output    Changes in the amount of fluids you drank before and after surgery may result in problems urinating.  It is important to stay well-hydrated after surgery and drink plenty of water. If it has been greater than 8 hours since you have urinated despite drinking plenty of water, call your Surgeon Team.     Medication instructions -  Anticoagulation - aspirin    Take the aspirin as prescribed twice per day for a total of 6 weeks after surgery.  This is given to help minimize your risk of blood clot.     Activity - Exercises to prevent blood clots    Unless otherwise directed by your Surgeon team, perform the following  "exercises at least three times per day for the first four weeks after surgery to prevent blood clots in your legs: 1) Point and flex your feet (Ankle Pumps), 2) Move your ankle around in big circles, 3) Wiggle your toes, 4) Walk, even for short distances, several times a day, will help decrease the risk of blood clots.     Comfort and Pain Management - Pain after Surgery    Pain after surgery is normal and expected.  You will have some amount of pain for several weeks after surgery.  Your pain will improve with time.  There are several things you can do to help reduce your pain including: rest, ice, elevation, and using pain medications as needed. Contact your Surgeon Team if you have pain that persists or worsens after surgery despite rest, ice, elevation, and taking your medication(s) as prescribed. Contact your Surgeon Team if you have new numbness, tingling, or weakness in your operative extremity.     Comfort and Pain Management - Swelling after Surgery    Swelling and/or bruising of the surgical extremity is common and may persist for several months after surgery. In addition to frequent icing and elevation, gentle compressive support with an ACE wrap or tubigrip may help with swelling. Apply compression regularly, removing at least twice daily to perform skin checks. Contact your Surgeon Team if your swelling increases and is NOT associated with an increase in your activity level, or if your swelling increases and is associated with redness and pain.     Comfort and Pain Management - LOWER Extremity Elevation    Swelling is expected for several months after surgery. This type of swelling is usually associated with gravity and activity, and can be improved with elevation.   The best way to do this is to get your \"toes above your nose\" by laying down and placing several pillows lengthwise under your calf and heel. When elevating your leg keep your knee completely straight. Perform this elevation as often as " possible especially for the first two weeks after surgery.     Comfort and Pain Management - Cold therapy    Ice can be used to control swelling and discomfort after surgery. Place a thin towel over your operative site and apply the ice pack overtop. Leave ice pack in place for 20 minutes, then remove for 20 minutes. Repeat this 20 minutes on/20 minutes off routine as often as tolerated.     Medication Instructions - Acetaminophen (TYLENOL) Instructions    You were discharged with acetaminophen (TYLENOL) for pain management after surgery. Acetaminophen most effectively manages pain symptoms when it is taken on a schedule without missing doses (every four, six, or eight hours). Your Provider will prescribe a safe daily dose between 3000 - 4000 mg.  Do NOT exceed this daily dose. Most patients use acetaminophen for pain control for the first four weeks after surgery.  You can wean from this medication as your pain decreases.     Medication Instructions - NSAID Instructions    You were discharged with an anti-inflammatory medication for pain management to use in combination with acetaminophen (TYLENOL) and the narcotic pain medication.  Take this medication exactly as directed.  You should only take one anti-inflammatory at a time.  Some common anti-inflammatories include: ibuprofen (ADVIL, MOTRIN), naproxen (ALEVE, NAPROSYN), celecoxib (CELEBREX), meloxicam (MOBIC), ketorolac (TORADOL).  Take this medication with food and water.     Medication Instructions - Opioid Instructions (Greater than or equal to 65 years)    You were discharged with an opioid medication (hydromorphone, oxycodone, hydrocodone, or tramadol). This medication should only be taken for breakthrough pain that is not controlled with acetaminophen (TYLENOL). If you rate your pain less than 3 you do not need this medication.  Pain rating 0-3:  You do not need this medication  Pain rating 4-6:  Take 1/2 tablet every 4-6 hours as needed  Pain rating 7-10:   Take 1 tablet every 4-6 hours as needed  Do not exceed 4 tablets per day     Medication Instructions - Opioids - Tapering Instructions    In the first three days following surgery, your symptoms may warrant use of the narcotic pain medication every four to six hours as prescribed. This is normal. As your pain symptoms improve, focus your efforts on decreasing (tapering) use of narcotic medications. The most successful tapering strategy is to first, decrease the number of tablets you take every 4-6 hours to the minimum prescribed. Then, increase the amount of time between doses.  For example:  First, taper to   or 1 tablet every 4-6 hours.  Then, taper to   or 1 tablet every 6-8 hours.  Then, taper to   or 1 tablet every 8-10 hours.  Then, taper to   or 1 tablet every 10-12 hours.  Then, taper to   or 1 tablet at bedtime.  The bedtime dose can help with comfort during sleep and is typically the last dose to be discontinued after surgery.     Follow Up Care    Follow-up with your Surgeon Team/ISA Henson in 2 weeks for wound check, we will get AP and lateral of the left hip at that time     Physical Therapy Adult Consult    Evaluate and treat as clinically indicated.    Reason: Status Post Hip Surgery     Occupational Therapy Adult Consult    Evaluate and treat as clinically indicated.    Reason: Status Post Hip Surgery     Diet    Follow this diet upon discharge: Orders Placed This Encounter      Advance Diet as Tolerated: Regular Diet Adult         Significant Results and Procedures   Most Recent 3 CBC's:  Recent Labs   Lab Test 05/22/24  0559 05/21/24  0533 05/20/24  2302   WBC 4.3 10.1 15.0*   HGB 8.4* 10.0* 11.0*   MCV 99 98 103*   PLT 79* 89* 127*     Most Recent 3 BMP's:  Recent Labs   Lab Test 05/22/24  0559 05/22/24  0208 05/21/24 2059 05/21/24  0809 05/21/24  0533 05/20/24  0830 05/20/24  0614     --   --   --  143  --  140   POTASSIUM 4.2  --   --   --  4.4  --  4.0   CHLORIDE 112*  --   --   --   111*  --  109*   CO2 21*  --   --   --  20*  --  23   BUN 39.2*  --   --   --  34.6*  --  27.8*   CR 1.55*  --   --   --  1.66*  --  1.38*   ANIONGAP 7  --   --   --  12  --  8   SOLEDAD 7.4*  --   --   --  7.9*  --  8.4*   * 147* 183*   < > 152*   < > 112*    < > = values in this interval not displayed.     Most Recent 2 LFT's:  Recent Labs   Lab Test 05/22/24  0559 05/21/24  0533   * 121*   ALT 27 42   ALKPHOS 89 109   BILITOTAL 1.1 1.5*     Most Recent 3 INR's:  Recent Labs   Lab Test 05/22/24  0559 05/20/24  0806 04/07/23  0902   INR 1.64* 1.35* 1.16*   ,   Results for orders placed or performed during the hospital encounter of 05/19/24   CT Head w/o Contrast    Narrative    EXAM: CT HEAD W/O CONTRAST  LOCATION: [facility]  DATE: 5/19/2024    INDICATION: Fall, unknown head trauma.  COMPARISON: CT 12/6/2021.  TECHNIQUE: Routine CT Head without IV contrast. Multiplanar reformats. Dose reduction techniques were used.    FINDINGS:  INTRACRANIAL CONTENTS: No intracranial hemorrhage, extraaxial collection, or mass effect.  No CT evidence of acute infarct. Mild presumed chronic small vessel ischemic changes. Mild generalized volume loss. No hydrocephalus.     VISUALIZED ORBITS/SINUSES/MASTOIDS: No intraorbital abnormality. No paranasal sinus mucosal disease. No middle ear or mastoid effusion.    BONES/SOFT TISSUES: No acute abnormality.      Impression    IMPRESSION:  1.  No CT evidence for acute intracranial process.  2.  Mild chronic microvascular ischemic changes as above.   CT Cervical Spine w/o Contrast    Narrative    EXAM: CT CERVICAL SPINE W/O CONTRAST  LOCATION: [facility]  DATE: 5/19/2024    INDICATION: Fall, possible head injury, no neck pain  COMPARISON: None.  TECHNIQUE: Routine CT Cervical Spine without IV contrast. Multiplanar reformats. Dose reduction techniques were used.    FINDINGS:  VERTEBRA: Normal vertebral body heights and  alignment. No fracture or posttraumatic subluxation.     CANAL/FORAMINA: C3-4, moderate right foraminal stenosis. At C5-6, severe central and severe bilateral foraminal stenosis. At C6-7, severe central and severe bilateral foraminal stenosis.    PARASPINAL: No extraspinal abnormality.      Impression    IMPRESSION:  1.  No fracture or posttraumatic subluxation.  2.  High-grade central and bilateral foraminal stenosis at C5-6 and C6-7..   XR Pelvis w Hip Left G/E 2 Views    Narrative    EXAM: XR PELVIS AND HIP LEFT 2 VIEWS  LOCATION: [facility]  DATE: 5/19/2024    INDICATION: Fall, hip pain, history of bursitis and recent injection  COMPARISON: None.      Impression    IMPRESSION: Fracture through the left femur at the intertrochanteric/subtrochanteric junction. No dislocation. Mild degenerative change both hip joints. Right hip negative for fracture. Pelvis negative for fracture.   XR Chest Port 1 View    Narrative    EXAM: XR CHEST PORT 1 VIEW  LOCATION: [facility]  DATE: 5/19/2024    INDICATION: cough  COMPARISON: 9/12/2023.    FINDINGS: The heart size is normal. The thoracic aorta is calcified and tortuous. Mild scarring at the left lung base. The lungs are otherwise clear. Curvilinear lucency at the right hemidiaphragm is likely artifactual. There is no pneumothorax.   Degenerative disease in the spine. Spinal rods.      Impression    IMPRESSION: No acute abnormality.   XR Surgery CHERY G/T 5 Min Fluoro w Stills    Narrative    This exam was marked as non-reportable because it will not be read by a   radiologist or a [facility] non-radiologist provider.         XR Hip Left 2-3 Views    Narrative    EXAM: XR HIP LEFT 2-3 VIEWS  LOCATION: [facility]  DATE: 5/20/2024    INDICATION: Portable in PACU AP Lat of L hip Stat after surgery  COMPARISON: 5/19/2024 at 4:44 PM      Impression    IMPRESSION: Postoperative changes of  left femoral neck screw. Left intramedullary oliver with cerclage wire. Anatomic alignment of the oblique intertrochanteric fracture proximal left femur. Minimal degenerative changes in the left hip. Surgical drain.       Discharge Medications   Discharge Medication List as of 5/22/2024  1:07 PM        CONTINUE these medications which have CHANGED    Details   acetaminophen (TYLENOL) 325 MG tablet Take 2 tablets (650 mg) by mouth every 4 hours as needed for other (For optimal non-opioid multimodal pain management to improve pain control.), Disp-100 tablet, R-0, E-Prescribe      apixaban ANTICOAGULANT (ELIQUIS) 2.5 MG tablet Take 1 tablet (2.5 mg) by mouth 2 times daily for 42 days, Disp-84 tablet, R-0, E-Prescribe      oxyCODONE (ROXICODONE) 5 MG tablet Take 1-2 tablets (5-10 mg) by mouth every 4 hours as needed for moderate to severe pain, Disp-26 tablet, R-0, E-Prescribe      senna-docusate (SENOKOT-S/PERICOLACE) 8.6-50 MG tablet Take 1 tablet by mouth 2 times daily as needed for constipation, Disp-30 tablet, R-1, E-Prescribe           CONTINUE these medications which have NOT CHANGED    Details   atorvastatin (LIPITOR) 10 MG tablet Take 1 tablet (10 mg) by mouth daily, Disp-90 tablet, R-1, E-Prescribe      diclofenac (VOLTAREN) 1 % topical gel Apply 4 g topically 3 times daily as needed for moderate pain (bursitis), Disp-150 g, R-1, Local Print      hydrochlorothiazide (HYDRODIURIL) 12.5 MG tablet Take 1 tablet (12.5 mg) by mouth daily, Disp-90 tablet, R-1, E-PrescribePatient to call when refill needed      levothyroxine (SYNTHROID/LEVOTHROID) 25 MCG tablet Take 1 tablet (25 mcg) by mouth daily, Disp-90 tablet, R-1, E-Prescribe      metFORMIN (GLUCOPHAGE) 500 MG tablet Take 1 tablet (500 mg) by mouth 2 times daily (with meals), Disp-180 tablet, R-1, E-Prescribe      metoprolol succinate ER (TOPROL XL) 100 MG 24 hr tablet 1 1/2 ( 150 mg ) po daily, Disp-135 tablet, R-1, E-PrescribePatient to call when refill needed       multivitamin w/minerals (THERA-VIT-M) tablet Take 1 tablet by mouth daily, Historical      omeprazole (PRILOSEC) 20 MG DR capsule TAKE 1 CAPSULE BY MOUTH ONCE DAILY 30 TO 60 MINUTES BEFORE A MEAL, Disp-90 capsule, R-1, E-Prescribe      spironolactone (ALDACTONE) 25 MG tablet Take 1 tablet (25 mg) by mouth daily, Disp-90 tablet, R-1, E-Prescribe      tiZANidine (ZANAFLEX) 4 MG tablet TAKE 1/2 (ONE-HALF) TO 1  TABLET BY MOUTH UP TO THREE TIMES DAILY AS NEEDED, Disp-30 tablet, R-0, E-Prescribe      valsartan (DIOVAN) 160 MG tablet Take 1 tablet (160 mg) by mouth daily, Disp-90 tablet, R-1, E-Prescribe           STOP taking these medications       traMADol (ULTRAM) 50 MG tablet Comments:   Reason for Stopping:             Allergies   Allergies   Allergen Reactions    Bee Venom Swelling     Gum swelling

## 2024-05-24 ENCOUNTER — LAB REQUISITION (OUTPATIENT)
Dept: LAB | Facility: CLINIC | Age: 74
End: 2024-05-24
Payer: COMMERCIAL

## 2024-05-24 DIAGNOSIS — E11.9 TYPE 2 DIABETES MELLITUS WITHOUT COMPLICATIONS (H): ICD-10-CM

## 2024-05-24 DIAGNOSIS — Z11.1 ENCOUNTER FOR SCREENING FOR RESPIRATORY TUBERCULOSIS: ICD-10-CM

## 2024-05-24 DIAGNOSIS — S72.141A DISPLACED INTERTROCHANTERIC FRACTURE OF RIGHT FEMUR, INITIAL ENCOUNTER FOR CLOSED FRACTURE (H): ICD-10-CM

## 2024-05-24 DIAGNOSIS — E11.43 TYPE 2 DIABETES MELLITUS WITH DIABETIC AUTONOMIC (POLY)NEUROPATHY (H): ICD-10-CM

## 2024-05-24 LAB
ALBUMIN SERPL BCG-MCNC: 2.5 G/DL (ref 3.5–5.2)
ALP SERPL-CCNC: 146 U/L (ref 40–150)
ALT SERPL W P-5'-P-CCNC: 40 U/L (ref 0–70)
ANION GAP SERPL CALCULATED.3IONS-SCNC: 7 MMOL/L (ref 7–15)
AST SERPL W P-5'-P-CCNC: 94 U/L (ref 0–45)
BILIRUB DIRECT SERPL-MCNC: 0.34 MG/DL (ref 0–0.3)
BILIRUB SERPL-MCNC: 0.9 MG/DL
BUN SERPL-MCNC: 43.7 MG/DL (ref 8–23)
CALCIUM SERPL-MCNC: 7.8 MG/DL (ref 8.8–10.2)
CHLORIDE SERPL-SCNC: 111 MMOL/L (ref 98–107)
CK SERPL-CCNC: 681 U/L (ref 39–308)
CREAT SERPL-MCNC: 1.37 MG/DL (ref 0.67–1.17)
DEPRECATED HCO3 PLAS-SCNC: 23 MMOL/L (ref 22–29)
EGFRCR SERPLBLD CKD-EPI 2021: 55 ML/MIN/1.73M2
GLUCOSE SERPL-MCNC: 160 MG/DL (ref 70–99)
INR PPP: 1.47 (ref 0.85–1.15)
PATH REPORT.COMMENTS IMP SPEC: NORMAL
PATH REPORT.FINAL DX SPEC: NORMAL
PATH REPORT.MICROSCOPIC SPEC OTHER STN: NORMAL
PATH REPORT.RELEVANT HX SPEC: NORMAL
POTASSIUM SERPL-SCNC: 4.6 MMOL/L (ref 3.4–5.3)
PROT SERPL-MCNC: 4.9 G/DL (ref 6.4–8.3)
SODIUM SERPL-SCNC: 141 MMOL/L (ref 135–145)

## 2024-05-24 PROCEDURE — 36415 COLL VENOUS BLD VENIPUNCTURE: CPT | Performed by: FAMILY MEDICINE

## 2024-05-24 PROCEDURE — 82550 ASSAY OF CK (CPK): CPT | Performed by: FAMILY MEDICINE

## 2024-05-24 PROCEDURE — 82040 ASSAY OF SERUM ALBUMIN: CPT | Performed by: FAMILY MEDICINE

## 2024-05-24 PROCEDURE — 82248 BILIRUBIN DIRECT: CPT | Performed by: FAMILY MEDICINE

## 2024-05-24 PROCEDURE — 85610 PROTHROMBIN TIME: CPT | Performed by: FAMILY MEDICINE

## 2024-05-24 PROCEDURE — P9604 ONE-WAY ALLOW PRORATED TRIP: HCPCS | Performed by: FAMILY MEDICINE

## 2024-05-24 PROCEDURE — 82374 ASSAY BLOOD CARBON DIOXIDE: CPT | Performed by: FAMILY MEDICINE

## 2024-05-24 PROCEDURE — 86481 TB AG RESPONSE T-CELL SUSP: CPT | Performed by: FAMILY MEDICINE

## 2024-05-27 DIAGNOSIS — Z98.890 STATUS POST HIP SURGERY: ICD-10-CM

## 2024-05-27 RX ORDER — OXYCODONE HYDROCHLORIDE 5 MG/1
5-10 TABLET ORAL EVERY 4 HOURS PRN
Qty: 26 TABLET | Refills: 0 | Status: SHIPPED | OUTPATIENT
Start: 2024-05-27 | End: 2024-07-25

## 2024-05-28 ENCOUNTER — TELEPHONE (OUTPATIENT)
Dept: ONCOLOGY | Facility: CLINIC | Age: 74
End: 2024-05-28

## 2024-05-28 ENCOUNTER — DOCUMENTATION ONLY (OUTPATIENT)
Dept: OTHER | Facility: CLINIC | Age: 74
End: 2024-05-28

## 2024-05-28 LAB
CULTURE HARVEST COMPLETE DATE: NORMAL
GAMMA INTERFERON BACKGROUND BLD IA-ACNC: 0.02 IU/ML
INTERPRETATION: NORMAL
M TB IFN-G BLD-IMP: NEGATIVE
M TB IFN-G CD4+ BCKGRND COR BLD-ACNC: 9.98 IU/ML
MITOGEN IGNF BCKGRD COR BLD-ACNC: 0.01 IU/ML
MITOGEN IGNF BCKGRD COR BLD-ACNC: 0.02 IU/ML
PATH REPORT.COMMENTS IMP SPEC: ABNORMAL
PATH REPORT.COMMENTS IMP SPEC: NORMAL
PATH REPORT.COMMENTS IMP SPEC: NORMAL
PATH REPORT.COMMENTS IMP SPEC: YES
PATH REPORT.FINAL DX SPEC: ABNORMAL
PATH REPORT.FINAL DX SPEC: NORMAL
PATH REPORT.GROSS SPEC: ABNORMAL
PATH REPORT.MICROSCOPIC SPEC OTHER STN: ABNORMAL
PATH REPORT.MICROSCOPIC SPEC OTHER STN: NORMAL
PATH REPORT.RELEVANT HX SPEC: ABNORMAL
PATH REPORT.RELEVANT HX SPEC: NORMAL
PATH REPORT.SUPPLEMENTAL REPORTS SPEC: NORMAL
PHOTO IMAGE: ABNORMAL
QUANTIFERON MITOGEN: 10 IU/ML
QUANTIFERON NIL TUBE: 0.02 IU/ML
QUANTIFERON TB1 TUBE: 0.04 IU/ML
QUANTIFERON TB2 TUBE: 0.03

## 2024-05-28 PROCEDURE — 88307 TISSUE EXAM BY PATHOLOGIST: CPT | Mod: 26 | Performed by: PATHOLOGY

## 2024-05-28 PROCEDURE — 88342 IMHCHEM/IMCYTCHM 1ST ANTB: CPT | Mod: 26 | Performed by: PATHOLOGY

## 2024-05-28 PROCEDURE — 88341 IMHCHEM/IMCYTCHM EA ADD ANTB: CPT | Mod: 26 | Performed by: PATHOLOGY

## 2024-05-28 NOTE — TELEPHONE ENCOUNTER
"[facility]: Cancer Care                                                                                          Patient returning call to RNCC. Patient states \" did you need something? I am in the middle of a lot of things. Do you have the results or why are you bothering to call me? This RN explained this call was placed to check-in with patient after discharging to TCU. This RN asked if there would be a better time to call, patient states \"I don't need a call unless you have my results\". This RN verbalized understanding of patient's request. At this time this RNCC will continue to monitor TCU progress through chart review, no further outreaches at this time.    Signature:  Rosita Aguiar, RN   "

## 2024-05-28 NOTE — TELEPHONE ENCOUNTER
UT/Voicemail    Clinical Data: Care Coordinator Outreach    Outreach attempted x 1.  Left message on patient's voicemail with call back information and requested return call.    Plan: Care Coordinator will try to reach patient again in 1-2 business days.    Rosita Aguiar RNTenet [place]: Transitions of Care Note  Chief Complaint   Patient presents with    Clinic Care Coordination - Follow-up       Most Recent Admission Date: 5/19/2024   Most Recent Admission Diagnosis: Fall from standing, initial encounter - W19.XXXA  Intertrochanteric fracture of left femur, closed, initial encounter (H) - S72.142A     Most Recent Discharge Date: 5/22/2024   Most Recent Discharge Diagnosis: Intertrochanteric fracture of left femur, closed, initial encounter (H) - S72.142A  Fall from standing, initial encounter - W19.XXXA  Fall on same level from slipping, tripping or stumbling, initial encounter - W01.0XXA  Compression fracture of thoracic vertebra, unspecified thoracic vertebral level, initial encounter (H) - S22.000A  Status post hip surgery - Z98.890     Transitions of Care Assessment:    Facility Name: [facility]   Transition Communication:  Notified facility of Primary Care- Care Coordination left message for Department of  [phone]      Follow up Plan:    Future Appointments   Date Time Provider Department Center   6/4/2024 10:40 AM Rafa Henson PA-C [facility]   10/14/2024  1:30 PM Mata Renteria MD [facility]   4/30/2025 10:10 AM Gm Coe MD [facility]       Outpatient Plan as outlined on AVS reviewed with patient.    Will continue to follow TCU plan of care during this time.    Rosita Aguiar, RN

## 2024-05-30 ENCOUNTER — TELEPHONE (OUTPATIENT)
Dept: FAMILY MEDICINE | Facility: CLINIC | Age: 74
End: 2024-05-30
Payer: COMMERCIAL

## 2024-05-30 NOTE — TELEPHONE ENCOUNTER
Patient Returning Call    Reason for call:  052224 - taken to [place] with multi fractured hip by ambulance - surgery done by a dr Morales -moved to [facility] for rehab - wish to clarify situation with primary for meds and RE>placement (at home) - concerns over sudden changes to medications by new facility - looking for new hemo chemo doctor due to certain persistentences and delays (Potentially someone at [facility])- bone marrow biopsy results/opinions - In PT and OT /significant progress - Decisions needed for 053124 - Phone appt?     Information relayed to patient:  messsage sent     Patient has additional questions:  Yes    What are your questions/concerns:  052224 - taken to [place] with multi fractured hip by ambulance - surgery done by a dr Morales -moved to [facility] for rehab - wish to clarify situation with primary for meds and RE>placement (at home) - concerns over sudden changes to medications by new facility - looking for new hemo chemo doctor due to certain persistentences and delays (Potentially someone at the [facility])- bone marrow biopsy results/opinions - In PT and OT /significant progress - Decisions needed for 053124 - Phone appt?     Okay to leave a detailed message?: Yes at Home number on file [phone] (home)

## 2024-05-30 NOTE — LETTER
June 6, 2024      Gucci Logan  [address] [facility] [address]        We have been unsuccessful reaching you by telephone. Please call the clinic at [phone] to schedule an appointment with a provider or let us know if you are getting care elsewhere.           Sincerely,        Richi Jaramillo MD

## 2024-06-04 ENCOUNTER — TELEPHONE (OUTPATIENT)
Dept: ORTHOPEDICS | Facility: OTHER | Age: 74
End: 2024-06-04

## 2024-06-04 ENCOUNTER — ANCILLARY PROCEDURE (OUTPATIENT)
Dept: GENERAL RADIOLOGY | Facility: CLINIC | Age: 74
End: 2024-06-04
Attending: PHYSICIAN ASSISTANT
Payer: COMMERCIAL

## 2024-06-04 ENCOUNTER — OFFICE VISIT (OUTPATIENT)
Dept: ORTHOPEDICS | Facility: CLINIC | Age: 74
End: 2024-06-04
Payer: COMMERCIAL

## 2024-06-04 ENCOUNTER — TELEPHONE (OUTPATIENT)
Dept: ORTHOPEDICS | Facility: CLINIC | Age: 74
End: 2024-06-04

## 2024-06-04 VITALS
BODY MASS INDEX: 35.33 KG/M2 | TEMPERATURE: 98.9 F | DIASTOLIC BLOOD PRESSURE: 70 MMHG | HEIGHT: 71 IN | SYSTOLIC BLOOD PRESSURE: 140 MMHG

## 2024-06-04 DIAGNOSIS — Z98.890 HISTORY OF HIP SURGERY: Primary | ICD-10-CM

## 2024-06-04 DIAGNOSIS — S72.142D CLOSED INTERTROCHANTERIC FRACTURE OF LEFT FEMUR WITH ROUTINE HEALING, SUBSEQUENT ENCOUNTER: ICD-10-CM

## 2024-06-04 DIAGNOSIS — S72.142A INTERTROCHANTERIC FRACTURE OF LEFT FEMUR, CLOSED, INITIAL ENCOUNTER (H): ICD-10-CM

## 2024-06-04 PROBLEM — E66.812 CLASS 2 SEVERE OBESITY DUE TO EXCESS CALORIES WITH SERIOUS COMORBIDITY IN ADULT (H): Status: ACTIVE | Noted: 2024-06-04

## 2024-06-04 PROBLEM — E66.01 CLASS 2 SEVERE OBESITY DUE TO EXCESS CALORIES WITH SERIOUS COMORBIDITY IN ADULT (H): Status: ACTIVE | Noted: 2024-06-04

## 2024-06-04 PROCEDURE — 73502 X-RAY EXAM HIP UNI 2-3 VIEWS: CPT | Mod: TC | Performed by: RADIOLOGY

## 2024-06-04 PROCEDURE — 99024 POSTOP FOLLOW-UP VISIT: CPT | Performed by: PHYSICIAN ASSISTANT

## 2024-06-04 ASSESSMENT — PAIN SCALES - GENERAL: PAINLEVEL: SEVERE PAIN (7)

## 2024-06-04 NOTE — TELEPHONE ENCOUNTER
Reason for Call:  Other call back    Detailed comments: Canelo thinks he might be moving down to, or closer to, the [facility].     He would like to find out if Dr Wagner has a personal recommendation of a provider that would be a good fit for him to continue care once [place] becomes too far away.     He says the [place] area would be ideal and then radiating out from there.     Phone Number Patient can be reached at: Cell number on file:    Telephone Information:   Mobile [phone]       Best Time: any    Can we leave a detailed message on this number? YES    Call taken on 6/4/2024 at 11:57 AM by Len Arias

## 2024-06-04 NOTE — PROGRESS NOTES
"Orthopedic Clinic Post-Operative Note    CHIEF COMPLAINT:   Chief Complaint   Patient presents with    Surgical Followup     open reduction internal fixation hip nailing left - Left       HISTORY OF PRESENT ILLNESS  Location: Left hip  Rating of Pain: 7 out of 10  Pain is better with: Rest  Pain improving overall: Yes  Associated Features: Denies numbness or tingling shooting burning electric pain.  Denies any current drainage although states that the wound was draining previously but has stopped for the last several days.  Patient concerns: Patient would like to be discharged from [facility] to a 1 level condo he has in [place] and do home care.  Additional History: Patient was admitted to the hospital through the [facility] emergency department on 5/19/2024.  Patient was assessed and had a hip fracture which Dr. Wagner operated on on 5/20/2024.  Patient was no more than 20 pounds on the left lower extremity x 6 weeks and was put on Eliquis for DVT prophylaxis.  Patient was discharged to [facility].  Patient states that it has been going well at [facility] and physical therapy is going good.    Patient's past medical, surgical, social and family histories reviewed.     REVIEW OF SYSTEMS  Review of systems negative other than positive findings in HPI.    Physical Exam:  Vitals: BP (!) 140/70 (BP Location: Right arm, Cuff Size: Adult Regular)   Temp 98.9  F (37.2  C) (Temporal)   Ht 1.803 m (5' 11\")   BMI 35.33 kg/m    BMI= Body mass index is 35.33 kg/m .  Constitutional: healthy, alert and no acute distress   Psychiatric: mentation appears normal and affect normal/bright  NEURO: no focal deficits, CMS intact left lower extremity with slight decrease sensation just below the calf.  RESP: Normal with easy respirations and no use of accessory muscles to breathe, no audible wheezing or retractions  CV: Calf soft and nontender to palpation, leg warm   SKIN: Incisions healing well and minimal " swelling and erythema around them and no drainage.  Glue seems to be still over the top of it.  The rest of the hip with no erythema, rashes, excoriation, or breakdown. No evidence of infection.   MUSCULOSKELETAL:  INSPECTION of left hip: No gross deformities, erythema, edema, ecchymosis, atrophy or fasciculations.   PALPATION: no tenderness over the lateral hip and greater trochanteric region, thigh or lower leg.   ROM: Passive: Flexion to 100 degrees, internal rotation to 15 degrees, external rotation to 30 degrees.  All range of motion without catching locking or pain.     STRENGTH: 5 out of 5 hip flexion, quad and hamstring without pain.   SPECIAL TEST: none  GAIT: Not observed today.  Patient in wheelchair  Lymph: no palpable lymph nodes    Diagnostic Modalities:  XR Surgery CHERY G/T 5 Min Fluoro w Stills    Narrative    This exam was marked as non-reportable because it will not be read by a   radiologist or a [place] non-radiologist provider.         XR Hip Left 2-3 Views    Narrative    EXAM: XR HIP LEFT 2-3 VIEWS  LOCATION: [facility]  DATE: 5/20/2024    INDICATION: Portable in PACU AP Lat of L hip Stat after surgery  COMPARISON: 5/19/2024 at 4:44 PM      Impression    IMPRESSION: Postoperative changes of left femoral neck screw. Left intramedullary oliver with cerclage wire. Anatomic alignment of the oblique intertrochanteric fracture proximal left femur. Minimal degenerative changes in the left hip. Surgical drain.   I agree with the above x-rays.    X-rays done today showing good hardware placement no hardware failure no migration of hardware no loosening of hardware no backing out of hardware.  Fracture is reduced well.  Alignment acceptable    Independent visualization of the images was performed.       Impression: 1.  16 days status post left hip open reduction internal fixation with TFN nail and cerclage wire fixation by Dr. Wagner    FOCUSED PLAN:   Patient is at [facility] [place]  [place] and doing well there and working with physical therapy.  He can work on pivot transfers and gait training with walker and strengthening of upper extremities to help maintain his 20 pound max weightbearing status on the left lower extremity which he will have for the next 4 to 5 weeks.  Patient educated in washing the wound in 50% betadine and 50% soapy water daily for the next week/until wound is healed.  Anticoagulation will be to continue the Eliquis 2.5 mg twice a day until 6 weeks postop and then once a day after that x 1 year from surgery.  Patient would like to get his condo set up in [place] which is no steps and would like to stay there when he is done at [facility].  I talked him about that would be up to them when he is strong enough and ready but I think that would be fine to get home care there also.  Also reviewed the patient's pathology of the bone marrow which read metastatic prostatic adenocarcinoma, it looks like oncology has some notes in the chart but there is not a appointment coming up so we had our nursing team to set him up with oncology appointment his oncologist is Dr. Fernandez.  After visit summary printed out for [facility] and also himself.  Patient will follow-up with Dr. Wagner on 7/8/2024 and possibly get his weightbearing status increased at that point.    Re-x-ray on return: AP lateral of left hip      This note was dictated with [employer].    Rafa Henson PA-C

## 2024-06-04 NOTE — TELEPHONE ENCOUNTER
Called and left voicemail for patient stating that , , and Dr. Reza all go to [place].     Brigida Koch MS, ATC  Certified Athletic Trainer

## 2024-06-04 NOTE — PATIENT INSTRUCTIONS
Encounter Diagnoses   Name Primary?    Closed intertrochanteric fracture of left femur with routine healing, subsequent encounter     History of left hip open reduction internal fixation with wire and TFN nail by Dr. Wagner on 5/20/2024 Yes     Rest, ice and elevate above heart level as needed for pain control  Exam:  1.  Acceptable range of motion and strength  2.  Incision healing well.  3.  X-ray showing good hardware placement and no failure.      Recommendations medication/treatment orders:  1. Incision/Dressing: No dressing needed anymore.  In 1 week can start massaging incision.  Dr. Wagner would like half Betadine and half water solution washed on the incisions for 1 week  2. Anticoagulation: Continue Eliquis 2.5 mg twice a day x 6 weeks postoperatively and then once a day x 1 year postoperatively  3. Pain Medication: Continue narcotic as needed and Tylenol.  Narcotic the patient may need more some days and less other days depending on activity.  Over time he will gradually wean down and off.  4. Weight Bearing: Must continue partial weightbearing with max weight of 20 pounds left lower extremity for a full 6 to 7 weeks after surgery.  He will need to be doing this until his follow-up visit with Dr. Wagner.  5. Activity/Therapy: Continue formal physical therapy and work on gait training and upper body strength so that he can use the walker.  Working on pivot transfers.  As long as the patient's upper body strong enough can ambulate with walker doing 20 pounds left lower extremity.  6.  Transition: When patient is strong enough and ready he may have a 1 level condo in [place] that he could transfer to and get home care set up there.  He would not have any steps.  When it is safe for him to transfer there this would be fine with us.    Diagnosis: same as above    Follow-up:  Follow-up with Dr. Wagner on 7/8/2024 with x-rays at that time.  He may be able to increase his weightbearing status at that  time.    [employer] and [employer] may offer reliable information regarding your diagnosis and treatment plan.    THANK YOU for coming in today. If you receive a survey via [employer] or mail please let us know if there was anything you especially appreciated today or if there is any way we can improve our clinic. We appreciate your input.    GENERAL INFORMATION:  Bone and Joint Service Line for any issues or concerns: [phone]      I am not in the office Thursdays. Therefore non- urgent calls and medical messages received on Thursday will be addressed when we are back in the office on Wednesday. Urgent matters will be reviewed and addressed by one of our partners in the office as needed.    If lab work was done today as part of your evaluation you will generally be contacted via [employer], mail, or phone with the results within 1-5 days. If there is an alarming result we will contact you by phone. Lab results come back at varying times, I generally wait until all labs are resulted before making comments on results. Please note labs are automatically released to [employer] (if you have signed up for it) once available-at times you may see these prior to my having a chance to review them as well.    If you need refills please contact your pharmacist. They will send a refill request to me to review. Please allow 3 business days for us to process all refill requests. All narcotic refills should be handled in the clinic at the time of your visit.

## 2024-06-04 NOTE — LETTER
"6/4/2024      Gucci Logan  [address] [facility] [address]      Dear Colleague,    Thank you for referring your patient, Gucci Logan, to the [facility]. Please see a copy of my visit note below.    Orthopedic Clinic Post-Operative Note    CHIEF COMPLAINT:   Chief Complaint   Patient presents with     Surgical Followup     open reduction internal fixation hip nailing left - Left       HISTORY OF PRESENT ILLNESS  Location: Left hip  Rating of Pain: 7 out of 10  Pain is better with: Rest  Pain improving overall: Yes  Associated Features: Denies numbness or tingling shooting burning electric pain.  Denies any current drainage although states that the wound was draining previously but has stopped for the last several days.  Patient concerns: Patient would like to be discharged from [facility] to a 1 level condo he has in [place] and do home care.  Additional History: Patient was admitted to the hospital through the [facility] emergency department on 5/19/2024.  Patient was assessed and had a hip fracture which Dr. Wagner operated on on 5/20/2024.  Patient was no more than 20 pounds on the left lower extremity x 6 weeks and was put on Eliquis for DVT prophylaxis.  Patient was discharged to [facility].  Patient states that it has been going well at [facility] and physical therapy is going good.    Patient's past medical, surgical, social and family histories reviewed.     REVIEW OF SYSTEMS  Review of systems negative other than positive findings in HPI.    Physical Exam:  Vitals: BP (!) 140/70 (BP Location: Right arm, Cuff Size: Adult Regular)   Temp 98.9  F (37.2  C) (Temporal)   Ht 1.803 m (5' 11\")   BMI 35.33 kg/m    BMI= Body mass index is 35.33 kg/m .  Constitutional: healthy, alert and no acute distress   Psychiatric: mentation appears normal and affect normal/bright  NEURO: no focal deficits, CMS intact left lower extremity with slight decrease " sensation just below the calf.  RESP: Normal with easy respirations and no use of accessory muscles to breathe, no audible wheezing or retractions  CV: Calf soft and nontender to palpation, leg warm   SKIN: Incisions healing well and minimal swelling and erythema around them and no drainage.  Glue seems to be still over the top of it.  The rest of the hip with no erythema, rashes, excoriation, or breakdown. No evidence of infection.   MUSCULOSKELETAL:  INSPECTION of left hip: No gross deformities, erythema, edema, ecchymosis, atrophy or fasciculations.   PALPATION: no tenderness over the lateral hip and greater trochanteric region, thigh or lower leg.   ROM: Passive: Flexion to 100 degrees, internal rotation to 15 degrees, external rotation to 30 degrees.  All range of motion without catching locking or pain.     STRENGTH: 5 out of 5 hip flexion, quad and hamstring without pain.   SPECIAL TEST: none  GAIT: Not observed today.  Patient in wheelchair  Lymph: no palpable lymph nodes    Diagnostic Modalities:  XR Surgery CHERY G/T 5 Min Fluoro w Stills    Narrative    This exam was marked as non-reportable because it will not be read by a   radiologist or a [place] non-radiologist provider.         XR Hip Left 2-3 Views    Narrative    EXAM: XR HIP LEFT 2-3 VIEWS  LOCATION: [facility]  DATE: 5/20/2024    INDICATION: Portable in PACU AP Lat of L hip Stat after surgery  COMPARISON: 5/19/2024 at 4:44 PM      Impression    IMPRESSION: Postoperative changes of left femoral neck screw. Left intramedullary oliver with cerclage wire. Anatomic alignment of the oblique intertrochanteric fracture proximal left femur. Minimal degenerative changes in the left hip. Surgical drain.   I agree with the above x-rays.    X-rays done today showing good hardware placement no hardware failure no migration of hardware no loosening of hardware no backing out of hardware.  Fracture is reduced well.  Alignment  acceptable    Independent visualization of the images was performed.       Impression: 1.  16 days status post left hip open reduction internal fixation with TFN nail and cerclage wire fixation by Dr. Wagner    FOCUSED PLAN:   Patient is at [facility] and doing well there and working with physical therapy.  He can work on pivot transfers and gait training with walker and strengthening of upper extremities to help maintain his 20 pound max weightbearing status on the left lower extremity which he will have for the next 4 to 5 weeks.  Patient educated in washing the wound in 50% betadine and 50% soapy water daily for the next week/until wound is healed.  Anticoagulation will be to continue the Eliquis 2.5 mg twice a day until 6 weeks postop and then once a day after that x 1 year from surgery.  Patient would like to get his condo set up in [place] which is no steps and would like to stay there when he is done at [facility].  I talked him about that would be up to them when he is strong enough and ready but I think that would be fine to get home care there also.  Also reviewed the patient's pathology of the bone marrow which read metastatic prostatic adenocarcinoma, it looks like oncology has some notes in the chart but there is not a appointment coming up so we had our nursing team to set him up with oncology appointment his oncologist is Dr. Fernandez.  After visit summary printed out for [facility] and also himself.  Patient will follow-up with Dr. Wagner on 7/8/2024 and possibly get his weightbearing status increased at that point.    Re-x-ray on return: AP lateral of left hip      This note was dictated with [employer].    Rafa Henson PA-C        Again, thank you for allowing me to participate in the care of your patient.        Sincerely,        Rafa Henson PA-C

## 2024-06-05 ENCOUNTER — TELEPHONE (OUTPATIENT)
Dept: ORTHOPEDICS | Facility: OTHER | Age: 74
End: 2024-06-05
Payer: COMMERCIAL

## 2024-06-05 NOTE — TELEPHONE ENCOUNTER
This was completed yesterday at time of his appointment.  Writer collaborated with [place] to arrange transportation and patient was picked.     Writer spoke with patient as he verbalized frustration regarding finding out results of his bone biopsy at [facility] with orthopedic PA vs from the oncologist.  He feels that there has been a delay in getting his results and he feels he will likely transfer his care elsewhere. The current plan was to wait for all of the bone marrow biopsy test results to come back, which I reiterated to the patient. He did not believe that it can take several weeks to get results back.     I have reached out to his oncologist to update her and their team will reach out to the patient.      I offered my assistance with helping to coordinate care with another care team and the patient declined.      Nabila TERAN RN, BSN - Specialty Clinic Manager . . .  6/5/2024, 9:27 AM

## 2024-06-05 NOTE — TELEPHONE ENCOUNTER
Other: Patient calling back to check the status of this. Patient no longer would like Dr. Fernandez to be involved in this process. He would like like to hear back from Rafa WOMACK  or Dr. Wagner' s team.      Could we send this information to you in [employer] or would you prefer to receive a phone call?:   Patient would prefer a phone call   Okay to leave a detailed message?: Yes at Cell number on file:    Telephone Information:   Mobile [phone]     Please Do Not Leave detailed message but can say who is calling

## 2024-06-05 NOTE — TELEPHONE ENCOUNTER
"[facility] Call Center    Phone Message    May a detailed message be left on voicemail: yes     Reason for Call: Other: Patient stated that he is sorry that he forgot to remind Rafa Henson to give him the copy of the \"bone chip results.\" He is wondering if he can send someone over to the clinic to pick it up with his consent. He would like an answer asap.     Action Taken: Other: Orthopedics    Travel Screening: Not Applicable     Date of Service:                                                                     "

## 2024-06-05 NOTE — TELEPHONE ENCOUNTER
Other: pt called back, can send via fax over to home care facility. [facility] Fax [phone]. Can care team call pt to confirm once it has been sent?       Could we send this information to you in [email or web address] [employer] or would you prefer to receive a phone call?:   Patient would prefer a phone call   Okay to leave a detailed message?: Yes at Cell number on file:    Telephone Information:   Mobile [phone]

## 2024-06-05 NOTE — TELEPHONE ENCOUNTER
Attempted to contact patient, phone number went straight to Voicemail. Mailbox is full.     I spoke with Dr. Fernandez who will be putting in documentation regarding results and recommended next steps for us to relay to patient.     If patient wants to transfer care, we can assist him do so.     If he returns call you can attempt to reach me directly at [phone].     Nabila TERAN RN, BSN - Specialty Clinic Manager . . .  6/5/2024, 4:06 PM

## 2024-06-05 NOTE — TELEPHONE ENCOUNTER
Bone marrow biopsy results need to come from the patient's oncology care team as his oncologist, Dr. Fernandez, is the provider who ordered this test.     Routing to oncology care team to address.     Skylar Gregory RN on 6/5/2024 at 12:09 PM

## 2024-06-06 NOTE — TELEPHONE ENCOUNTER
Attempted to contact patient several times today at the cell phone number listed.  This again goes straight to  but the VM is full. There are no other authorized phone numbers on file to contact.     Nabila TERAN RN, BSN - Specialty Clinic Manager . . .  6/6/2024, 4:48 PM

## 2024-06-06 NOTE — TELEPHONE ENCOUNTER
Called patient to schedule appointment. No answer. Lm for patient to call back. This is the 3rd attempt. Letter will be placed in mail    Sanjeev-

## 2024-06-07 NOTE — TELEPHONE ENCOUNTER
"Should the patient return call, please notify him that we are working on this request and if he has any concerns in the mean time, he can contact patient relations again.      The below is for documentation purposes.     Patient was transferred to me via central call center.  I have provided the patient my direct line several times.  When I introduced myself he said he did not want to talk to me.  He said he wanted to talk to Dr. Wagner's team, not Dr. Fernandez's and I reminded him that I am the specialty clinic manager for both of these providers.      He was upset that he has not received the results faxed to him yet.  I explained to him that I have been trying to reach him to instruct him on our records release process.  I also wanted to confirm which results he is requesting.     He kept saying everything I was telling him was wrong and that I never listen to him (despite the several hours I have spent talking to him in person during his admission and again during his recent office visit).      The patient was very upset that I had not already faxed the records to his TCU ([facility]).  According to our current processes, records requests are to be written requests made to our [facility] department.  The [facility] policy: Protected Health Information, Individual Rights notes: \"Request: Patient requests to inspect or obtain a copy of their information should be in writing and submitted to [facility] for review and handling.\"  I did not have the opportunity to explain this to the patient before he hung up on me.     The patient had submitted a complaint with patient relations that I have been working with on an ongoing basis.  I updated them on this conversation and they have looped in the risk department to determine if I am able to accept a verbal request to release/fax this result.      Nabila TERAN, RN, BSN - Specialty Clinic Manager . . .  6/7/2024, 3:15 PM            "

## 2024-06-07 NOTE — CONFIDENTIAL NOTE
Medical Oncology Update    - 5/19/24-5/22/24 pt admitted to [facility] for fall w/intertrochanteric fracture of left femur. I coordinated with Medical Oncology Clinic Manager Nabila who coordinated with pt, orthopedic surgery team and pathology team to arrange for bone marrow biopsy at time of fracture repair. I did order bone marrow biopsy and associated tests to further evaluate for pancytopenia.    - 5/22/24 bone marrow biopsy results noted. Initially FISH and cytogenetics were ordered, these tests were cancelled by pathology team after bone marrow morphology showed metastatic prostate adenocarcinoma.    Pt is currently in TCU  Pt needs follow up with oncology team to discuss the above results and discuss further workup (including PET/CT, PSA, possibly MRI brain). In addition, I have been unable to discuss in detail the results of the work up I had done previously, it was supposed to be discussed during out 5/17/24 visit but I was unable to do so (see my documentation from 5/17/24 regarding that). At this time, I am unclear if pt wants to follow up with me vs establish care with a different oncology team. Regardless, he should be seen by oncologist as soon as possible.    I have discussed the above with Nabila who will help coordinate pts care.       Cheri Fernandez D.O.  Hematology/Oncology  [facility]

## 2024-06-11 ENCOUNTER — PATIENT OUTREACH (OUTPATIENT)
Dept: ONCOLOGY | Facility: CLINIC | Age: 74
End: 2024-06-11

## 2024-06-11 ENCOUNTER — VIRTUAL VISIT (OUTPATIENT)
Dept: FAMILY MEDICINE | Facility: CLINIC | Age: 74
End: 2024-06-11
Payer: COMMERCIAL

## 2024-06-11 DIAGNOSIS — D61.818 PANCYTOPENIA (H): ICD-10-CM

## 2024-06-11 DIAGNOSIS — C61 PROSTATE CANCER (H): Primary | ICD-10-CM

## 2024-06-11 DIAGNOSIS — Z87.81 HISTORY OF HIP FRACTURE: ICD-10-CM

## 2024-06-11 PROCEDURE — 99443 PR PHYSICIAN TELEPHONE EVALUATION 21-30 MIN: CPT | Mod: 93 | Performed by: FAMILY MEDICINE

## 2024-06-11 NOTE — PROGRESS NOTES
New Patient Oncology Nurse Navigator Note     Referring provider:   Referred By    Provider Department Location Phone   Richi Jaramillo MD [facility] [phone]      Referred to (specialty): Medical Oncology    Date Referral Received:   06/11/24     Evaluation for :   Bone marrow biopsy showing metastatic prostate cancer- PSA normal     Clinical History (per Nurse review of records provided):    This is a current patient of Dr. Cheri Fernandez, who was seen initially for anemia.  Patient eventually went on to have a BMBX:  5/22/24  Flow Cytometry: Yes  Cytogenetics: Order for conventional cytogenetics and FISH canceled by [facility] and identifying metastatic prostate cancer in this case  Molecular Diagnostics: MDS panel order cancelled by [facility] on identifying metastatic prostate cancer in this case.   Microbiology: No  Virology: No  Other: No      Request sent to oncology clinic to coordinate return visit.  I have also sent an in-basket message to Dr. Fernandez to inquire if referral to radiation oncology is appropriate at this time.    6/12/24 1020  Patient is to be considered a new patient as he was initially seen by Dr. Fernandez.  Patient wishes to see a new provider.      I have a current potential hold on 7/1/24, Dr. Burrell @ [facility], in person.    I called Canelo in attempt to discuss referral to medical oncology and potential radiation oncology consult.  I LM for him to return my call.    6/12/24 1245  Canelo returned my call.  I introduced my role and reviewed what this consult visit will entail/what to expect.  He had many questions and indicates he has not been given answers.  He wanted to know the prognosis and plan.  I explained that this consult would be a full review of what we know at this point and recommendation of plan going forward.  I discussed that I am an RN and it is the oncologist that would be able to fully provide the details he is seeking.  We discussed options of  location.  Based on our conversation I let him know he may be best served by meeting with a prostate cancer specialist.  I reviewed the location of the [facility].  He seemed to have a difficult time believing this is prostate cancer as his PSA levels have been normal.  He also has an upcoming appointment with Dr. Giraldo, urology, on 6/26, at the same location.  I provided the options we have open for first appointments.  He needs to determine transportation.  He hung up the phone after I could speak any further.    He does have my phone number and I will await call back.    HOLD: Dr. Zarate, 6[place]17[place] [facility], in person    6/12/24 1500  Canelo called me back and LM.  I attempted to call him back, but there was no answer and his voicemail was full.  I tried again and was still not able to reach him.  I will try again later.    Canelo returned my call.  He is not able to provide me with an answer for appointment he would like to come to.  He discusses again his difficulty with transportation due to being w/c bound, non-weight bearing and at a TCU.  He will ultimately be transferring to a nursing home closer to [place].  He also discussed changing insurance.  He is open to discussion with a  to see if they may have any options for resources of transportation.  I discussed an application for [employer] may be an option, but did alert that this can take 3-4 weeks for approval.  This may be best filled out by pcp.  He ultimately said Thank you and hung up when there was nothing more I could provide at this moment.      I will continue to hold 6/17 appointment with Dr. Zarate.  I have a message out to Dr. Zarate to inquire if he agrees with the radiation oncology referral at this time.    6/13/24  Canelo called me today.  I discussed that Dr. Zarate agrees patient should see radiation oncology as well.  To help aid with transportation issues, I discussed we could have him see both medical  oncology and radiation oncology on 6/26.    HOLD: Dr. Gan 6/26 @ [facility] 1-2 PM  HOLD: Dr. Plunkett 6/26 @ [facility], 3-4 PM    I am exploring how he will be able to get assistance from one site to the other.  I have an inquiry out to management at [facility], to inquire if the attendants/shuttle at each site are able to assist as he is w/c bound and will not have any assistant with him.  I let Canelo know that I will call him back.    6/13/24  I called Canelo and ANTWAN.  He later returned my call.  I indicated he would need to get to the  desk on his own and from there they can push the wheel chair.  He indicates he can maneuver his own wheelchair. I discussed that the shuttle bus that would take him from [facility] to the [facility] does not drop off at the front door.  I indicated he would be dropped off at the sidewalk perhaps 200 feet from the front door.  He would need to navigate himself from the sidewalk corner to the  desk.  From there they can assist with getting him to the department.  He seemed to think I was referring to the shuttle bus as being [employer].  I attempted to clarify the situation again but he indicated that I was talking in circles and he needed to figure out [employer] first.  He wants to keep the appointment/hold on 6/26.  He said goodbye and I stopped him from hanging up to let him know that it was very possible [employer] would not be set up in time for appointment on 6/26, nor is [employer] a guarantee.  He expressed frustration and that he needs to figure this out with Dr. Jaramillo's office and hung up.    6/17/24  Canelo called me today.  He is inquiring about a PET scan.  I discussed that Dr. Zarate did recommend a PET.  We have been holding off on scheduling anything for him to determine his transportation.  He has determined he can get a a ride with a van that can help him to travel between sites.  He would like to move forward with the following plan:    HOLD: Dr. Gan 6/26 @ [facility] 1-2  PM  HOLD: Dr. Plunkett 6/26 @ [facility], 3-4 PM    I have also reached out to Dr. Plunkett to inquire if he would like a PSMA PET and if so to place orders.  I let Canelo know I will call him back to let him know the outcome.  We are moving forward with the plan above. I provided the phone numbers for rad onc and med onc departments as well as new patient scheduling.  He hung up before I could transfer his call.    6/20/24  I called in attempt to let Canelo know that the PET order was placed by Dr. Plunkett.  I LM indicating order was placed and nuclear medicine will reach out to schedule.    Lashonda Gonzalez, RN, BSN  Oncology New Patient Nurse Navigator   [facility]  [phone]

## 2024-06-11 NOTE — PROGRESS NOTES
Canelo is a 74 year old who is being evaluated via a billable telephone visit.    What phone number would you like to be contacted at? [phone]  How would you like to obtain your AVS? Mail a copy  Originating Location (pt. Location): TCU    Distant Location (provider location):  On-site        Lakhwinder Lemos is a 74 year old, presenting for the following health issues:  Follow Up (Fall. in care facility and states that he is doing well/States that he wants his results from his surgery)      6/11/2024    10:06 AM   Additional Questions   Roomed by rafal CUELLAR     Fell on May 22  Hip fracture    Dr Wagner   Had orif hip fracture    Non weight bearing  And then only incrementally     [facility]  [facility]  Fax [phone]          Objective           Vitals:  No vitals were obtained today due to virtual visit.    Physical Exam   General: Alert and no distress //Respiratory: No audible wheeze, cough, or shortness of breath // Psychiatric:  Appropriate affect, tone, and pace of words    ASSESSMENT / PLAN:  (C61) Prostate cancer (H)  (primary encounter diagnosis)  Comment: very unusual case.  Patient has had all normal psa readings and last one was less than two but now has pathology findings in bone fragments of hip ( from recent orif ) and also more typical bone marrow biopsy of metastatic prostate cancer.  He needs to see both urology and oncology.  Did referrals. He will schedule.   Plan: Adult Urology  Referral, Adult         Oncology/Hematology  Referral             (Z87.81) History of hip fracture  Comment: patient currently recovering from orif hip.    Plan: as above     (D61.818) Pancytopenia (H)  Comment: the reason for the BMB was the pancytopenia.  Plan: follow up with heme/ onc for this also.    Went through recent records in detail.       I reviewed the patient's medications, allergies, medical history, family history, and social history.    Richi Jaramillo MD            Phone call duration: 23 minutes  Signed Electronically by: Richi Jaramillo MD

## 2024-06-12 ENCOUNTER — TRANSCRIBE ORDERS (OUTPATIENT)
Dept: ONCOLOGY | Facility: CLINIC | Age: 74
End: 2024-06-12
Payer: COMMERCIAL

## 2024-06-12 DIAGNOSIS — C61 PROSTATE CANCER (H): Primary | ICD-10-CM

## 2024-06-12 NOTE — TELEPHONE ENCOUNTER
MEDICAL RECORDS REQUEST   [facility]  Urology Clinic  [address] [place] [address]  PHONE: [phone]  Fax: [phone]        FUTURE VISIT INFORMATION                                                   Gucci Logan, : 1950 scheduled for future visit at [facility]    APPOINTMENT INFORMATION:  Date: 2024  Provider:  Jhon Giraldo MD  Reason for Visit/Diagnosis:  PROSTATE CANCER    REFERRAL INFORMATION:  Referring provider:  Richi Jaramillo MD in [facility]      RECORDS REQUESTED FOR VISIT                                                     NOTES  STATUS/DETAILS   OFFICE NOTE from referring provider  yes, 2024, 2023 -- Richi Jaramillo MD in [facility]   OFFICE NOTE from other specialist  yes   MEDICATION LIST  yes   LABS     URINALYSIS (UA)  yes   PROSTATE CANCER     IMAGES  yes, 2024 -- XR PELVIS  2024 -- US ABD   PATHOLOGY REPORT & SLIDES  yes, 2024 -- BONE, LEFT FEMUR -- [id number]   PSA (LAB)  yes     PRE-VISIT CHECKLIST      Joint diagnostic appointment coordinated correctly          (ensure right order & amount of time) Yes   RECORD COLLECTION COMPLETE Yes

## 2024-06-13 ENCOUNTER — TELEPHONE (OUTPATIENT)
Dept: FAMILY MEDICINE | Facility: CLINIC | Age: 74
End: 2024-06-13
Payer: COMMERCIAL

## 2024-06-13 NOTE — TELEPHONE ENCOUNTER
FYI - Status Update    Who is Calling: patient    Update: Pt would like to let PCP know that he is going to be moving Nursing Homes and needs to get [employer] set up. Would like to talk with care team on what to do for that.    Does caller want a call/response back: Yes     Okay to leave a detailed message?: Yes at Cell number on file:    Telephone Information:   Mobile [phone]

## 2024-06-13 NOTE — TELEPHONE ENCOUNTER
Called patient left VM advising he would have to contact [facility] for the application to get set up.     Shira

## 2024-06-17 ENCOUNTER — TRANSCRIBE ORDERS (OUTPATIENT)
Dept: OTHER | Age: 74
End: 2024-06-17

## 2024-06-17 DIAGNOSIS — C61 PROSTATE CANCER (H): Primary | ICD-10-CM

## 2024-06-17 NOTE — TELEPHONE ENCOUNTER
Patient is scheduled for all of the appropriate follow ups.  Per Patient Relations, okay for me to close this encounter as patients care is now coordinated.     Nabila TERAN RN, BSN - Specialty Clinic Manager . . .  6/17/2024, 1:50 PM

## 2024-06-18 ENCOUNTER — PATIENT OUTREACH (OUTPATIENT)
Dept: CARE COORDINATION | Facility: CLINIC | Age: 74
End: 2024-06-18
Payer: COMMERCIAL

## 2024-06-18 DIAGNOSIS — C61 PROSTATE CANCER (H): Primary | ICD-10-CM

## 2024-06-18 NOTE — PROGRESS NOTES
Social Work - Telephone/MyChart message  [facility]  Data:   Patient Name: Gucci Logan  Goes By: Canelo SADLER/Age: 1950 (74 year old)      Referral Source: [facility]    Reason for Referral: Transportation Resources    Intervention: Left voice message for patient on 2024 . Per note from Lashonda Gonzalez RN patient has found transportation for appointment on 24.  Plan:  will await patient's return phone call/message and provide assistance at that time.      IVANIA Ferrell,[facility]  Hematology/Oncology Social Worker  Phone:[phone] Pager: [phone]
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
## 2024-06-24 NOTE — TELEPHONE ENCOUNTER
RECORDS STATUS - ALL OTHER DIAGNOSIS      RECORDS RECEIVED FROM: [facility] - Internal records   DATE RECEIVED: 6/24

## 2024-06-24 NOTE — TELEPHONE ENCOUNTER
RECORDS STATUS - ALL OTHER DIAGNOSIS      RECORDS RECEIVED FROM: [facility] - Internal Records   DATE RECEIVED: 6/24

## 2024-06-24 NOTE — PROGRESS NOTES
RADIATION ONCOLOGY CONSULTATION  [facility]YSIC[facility]ANS    DATE:  6/26/2024     PATIENT NAME: Gucci Logan  MEDICAL RECORD NUMBER: [id number]    REFERRING PHYSICIAN:  Dr. Jaramillo    REASON FOR CONSULTATION: Consideration of  palliative radiotherapy for management of adenocarcinoma of the prostate.    Staging:  Clinical  M1  Pathology:    ORIF Left prox femur  metastatic prostate cancer         PSA:       Prostate Specific Antigen Screen   Date Value   08/29/2023 1.49   08/15/2022 1.66   11/12/2021 1.70     01/12/2021 1.81   11/05/2019 3.24   08/10/2018 3.54   07/24/2015 2.90   01/15/2014 2.40         HISTORY OF PRESENT ILLNESS: The patient is a 74 year old  man who underwent ORIF for  high subtrochanteric fracture of the left proximal femur(5/20/2024) after a fall.     Pelvic Xray (5/19/2024) showed left femur fracture and he underwent ORIF @ [facility]      The pathology from the surgery showed metastatic adenocarcinoma of prostate. Stains performed include CK AE1/AE3 (positive), CK7(negative),CK20(negative), NKX3.1(positive), PAX8(negative), TTF-1(negative), GATA3(negative), SATB2(negative), CDX2(negative), and  arginase(negative). The staining profile was consistent with metastatic adenocarcinoma of the prostate    The patient also underwent a BM biopsy for chronic anemia with thrombocytopenia(5/22/2024) and it showed no increase in myeloid blasts and no abnormal myeloid blast population.    The PSA is pending with last PSA of 1.49(8/29/2023).    PSMA PET/CT scan is scheduled for 7/3/2024    The patient is recovering from the surgery without complications.  He has left hip discomfort occasionally but is significantly improved and had no acute complaint.  No    PMH:   Past Medical History:   Diagnosis Date    Arthritis     Chronic, continuous use of opioids     Esophageal reflux 03/01/2014    Hearing problem     Hiatal hernia     Hypertension     Jaundice  05/31/2008    Liver disease     Obesity 01/24/2013    Obstructive sleep apnea     Sleep apnea     Advised CPAP machine. Not keen to use it.     Syncope, unspecified syncope type 2/20/2023       PSH:  Past Surgical History:   Procedure Laterality Date    ARTHROSCOPY KNEE RT/LT      (L) with partial medial meniscectomy    CATARACT IOL, RT/LT  Nov and Dec 2017    COLONOSCOPY N/A 4/24/2019    Procedure: COLONOSCOPY, WITH POLYPECTOMY AND BIOPSY;  Surgeon: Leventhal, Thomas Michael, MD;  [facility]: [facility]    COLONOSCOPY N/A 9/14/2022    Procedure: COLONOSCOPY;  Surgeon: Rafa Renee MD;  Location: [facility]    DACRYOCYSTORHINOSTOMY Left 10/16/2018    Procedure: DACRYOCYSTORHINOSTOMY;  Surgeon: Madhu Krause MD;  Location: [facility]    ENDOSCOPIC ENDONASAL SURGERY  1994    ENDOSCOPY  2-19-15    ESOPHAGOSCOPY, GASTROSCOPY, DUODENOSCOPY (EGD), COMBINED N/A 4/24/2019    Procedure: COMBINED ESOPHAGOSCOPY, GASTROSCOPY, DUODENOSCOPY (EGD) - hold aspirin ibuprofen or naproxen for one week prior (per physician order);  Surgeon: Leventhal, Thomas Michael, MD;  Location: [facility] OR    ESOPHAGOSCOPY, GASTROSCOPY, DUODENOSCOPY (EGD), COMBINED N/A 5/23/2023    Procedure: Esophagoscopy, gastroscopy, duodenoscopy, combined;  Surgeon: Rafa Renee MD;  Location:  GI    EYE SURGERY      Hernia surgery Left 1994    NASAL/SINUS POLYPECTOMY      OPEN REDUCTION INTERNAL FIXATION HIP NAILING Left 5/20/2024    Procedure: open reduction internal fixation hip nailing left;  Surgeon: Rafa Wagner MD;  Location: [facility] OR    REPAIR PTOSIS Left 10/16/2018    Procedure: LEFT UPPER LID PTOSIS AND BILATERAL  BROW PTOSIS REPAIR WITH LEFT DACRYOCYSTORHINOSTOMY ;  Surgeon: Madhu Krause MD;  Location: [facility]    REPAIR PTOSIS BROW Bilateral 10/16/2018    Procedure: REPAIR PTOSIS BROW;  Surgeon: Madhu Krause MD;  Location: [facility]    ROTATOR CUFF REPAIR RT/LT Right     ZZC OPEN RX ANKLE DISLOCATN+FIXATN      (R)    ZZC SPINAL FUSION,ANT,EA  ADNL LEVEL      T12 - L1       MEDICATIONS:  Current Outpatient Medications   Medication Sig Dispense Refill    acetaminophen (TYLENOL) 325 MG tablet Take 2 tablets (650 mg) by mouth every 4 hours as needed for other (For optimal non-opioid multimodal pain management to improve pain control.) 100 tablet 0    apixaban ANTICOAGULANT (ELIQUIS) 2.5 MG tablet Take 1 tablet (2.5 mg) by mouth 2 times daily for 42 days 84 tablet 0    atorvastatin (LIPITOR) 10 MG tablet Take 1 tablet (10 mg) by mouth daily 90 tablet 1    diclofenac (VOLTAREN) 1 % topical gel Apply 4 g topically 3 times daily as needed for moderate pain (bursitis) 150 g 1    hydrochlorothiazide (HYDRODIURIL) 12.5 MG tablet Take 1 tablet (12.5 mg) by mouth daily 90 tablet 1    levothyroxine (SYNTHROID/LEVOTHROID) 25 MCG tablet Take 1 tablet (25 mcg) by mouth daily 90 tablet 1    metFORMIN (GLUCOPHAGE) 500 MG tablet Take 1 tablet (500 mg) by mouth 2 times daily (with meals) (Patient taking differently: Take 1,000 mg by mouth daily (with breakfast)) 180 tablet 1    metoprolol succinate ER (TOPROL XL) 100 MG 24 hr tablet 1 1/2 ( 150 mg ) po daily (Patient taking differently: Take 150 mg by mouth daily) 135 tablet 1    multivitamin w/minerals (THERA-VIT-M) tablet Take 1 tablet by mouth daily      omeprazole (PRILOSEC) 20 MG DR capsule TAKE 1 CAPSULE BY MOUTH ONCE DAILY 30 TO 60 MINUTES BEFORE A MEAL 90 capsule 1    oxyCODONE (ROXICODONE) 5 MG tablet Take 1-2 tablets (5-10 mg) by mouth every 4 hours as needed for moderate to severe pain 26 tablet 0    senna-docusate (SENOKOT-S/PERICOLACE) 8.6-50 MG tablet Take 1 tablet by mouth 2 times daily as needed for constipation 30 tablet 1    spironolactone (ALDACTONE) 25 MG tablet Take 1 tablet (25 mg) by mouth daily 90 tablet 1    tiZANidine (ZANAFLEX) 4 MG tablet TAKE 1/2 (ONE-HALF) TO 1  TABLET BY MOUTH UP TO THREE TIMES DAILY AS NEEDED (Patient taking differently: Take 2-4 mg by mouth 3 times daily as needed for muscle  spasms Usually takes at 4pm with tramadol) 30 tablet 0    valsartan (DIOVAN) 160 MG tablet Take 1 tablet (160 mg) by mouth daily 90 tablet 1       ALLERGY:  Allergies   Allergen Reactions    Bee Venom Swelling     Gum swelling    Haemophilus B Polysaccharide Vaccine Other (See Comments)     PN: delirium  fever    Haemophilus Influenzae Nausea and Other (See Comments)     PN: delirium  fever    Other Reaction(s): Fever    PN: delirium  fever   PN: delirium  fever    Pneumococcal Vaccine      Other Reaction(s): *Unknown, adverse reaction       FAMILY HISTORY:  Family History   Problem Relation Age of Onset    Alzheimer Disease Mother 85    Dementia Mother     Cerebrovascular Disease Father 50    Cancer Father     Hypertension Father     Neurologic Disorder No family hx of     Diabetes No family hx of        SOCIAL HISTORY  Social History     Tobacco Use    Smoking status: Former     Current packs/day: 0.00     Types: Cigarettes     Start date: 1990     Quit date: 1999     Years since quittin.4     Passive exposure: Never    Smokeless tobacco: Never   Substance Use Topics    Alcohol use: Yes     Comment: rare        ROS: 10 point ROS reviewed as reported on the nursing assessment note.      PHYSICAL EXAMINATION:  VS: Vitals: BP (!) 158/69   Pulse 74   Resp 16   BMI= There is no height or weight on file to calculate BMI.  Alert and Oriented X 3.  Lungs: clear  Left hip well healed     IMPRESSION:  Metastatic adenocarcinoma of prostate.     RECOMMENDATION: I recommend palliative radiotherapy to the left hip.  The target for the treatment include the surgical site including the hardware.  I recommend 5 sessions of radiotherapy directed at the left hip using 2 fields.  The patient will first have a follow-up with Dr. Wagner of orthopedic surgery prior to initiating plan for radiotherapy.    I explained the benefits of radiotherapy as well as related toxicities. I described the early and late toxicities  associated with radiotherapy. The toxicities are itemized on the consent form .    BETTINA Gan M.D.  Department of Radiation Oncology  [facility]

## 2024-06-25 ENCOUNTER — MEDICAL CORRESPONDENCE (OUTPATIENT)
Dept: HEALTH INFORMATION MANAGEMENT | Facility: CLINIC | Age: 74
End: 2024-06-25
Payer: COMMERCIAL

## 2024-06-25 ENCOUNTER — TELEPHONE (OUTPATIENT)
Dept: FAMILY MEDICINE | Facility: CLINIC | Age: 74
End: 2024-06-25
Payer: COMMERCIAL

## 2024-06-25 DIAGNOSIS — D69.6 THROMBOCYTOPENIA (H): ICD-10-CM

## 2024-06-25 DIAGNOSIS — E11.9 DIABETES MELLITUS WITHOUT COMPLICATION (H): ICD-10-CM

## 2024-06-25 DIAGNOSIS — E78.5 HYPERLIPIDEMIA LDL GOAL <100: Primary | ICD-10-CM

## 2024-06-25 DIAGNOSIS — E03.9 HYPOTHYROIDISM, UNSPECIFIED TYPE: ICD-10-CM

## 2024-06-25 NOTE — TELEPHONE ENCOUNTER
"Several requests:     1) Patient would like PCP to order A1c and \"other\" blood work panels. He would like to repeat these after his hospital stay. If these are placed, his living facility will draw the labs. They can be faxed to the facility below.    2) Stated he stopped these medications in mid January: omeprazole, atorvastatin and levothyroxine. He stated that these were discussed at his 6/11/24 appt and now he would like his Medication list to be updated and faxed over to his nursing home ([facility]):   [phone]  Phone number to the facility: [phone]    3) He felt as if he was getting dehydrated so he stopped taking hydrochlorothiazide (HYDRODIURIL) 12.5 MG tablet and spironolactone (ALDACTONE) 25 MG tablet. He again wishes the pcp to take this off his medication list.     RN stated this should be discussed during a visit. He stated \"it was, but it just was not documented.\"     RN did update the patient that pcp is out of the office and the covering provider team may respond, but may think its more appropriate to wait until pcp returns.     Myrna Garrett RN on 6/25/2024 at 10:40 AM    "

## 2024-06-25 NOTE — TELEPHONE ENCOUNTER
Called and left detailed message stating Dr. Jaramillo is out of office returning on 7/1 and that pt's message will be forwarded to him to address when he returns. Asked that pt call back if any further questions or concerns.    Katie Nair RN  [facility]

## 2024-06-26 ENCOUNTER — LAB REQUISITION (OUTPATIENT)
Dept: LAB | Facility: CLINIC | Age: 74
End: 2024-06-26
Payer: COMMERCIAL

## 2024-06-26 ENCOUNTER — ONCOLOGY VISIT (OUTPATIENT)
Dept: ONCOLOGY | Facility: CLINIC | Age: 74
End: 2024-06-26
Attending: FAMILY MEDICINE
Payer: COMMERCIAL

## 2024-06-26 ENCOUNTER — PRE VISIT (OUTPATIENT)
Dept: UROLOGY | Facility: CLINIC | Age: 74
End: 2024-06-26

## 2024-06-26 ENCOUNTER — PRE VISIT (OUTPATIENT)
Dept: ONCOLOGY | Facility: CLINIC | Age: 74
End: 2024-06-26

## 2024-06-26 ENCOUNTER — PRE VISIT (OUTPATIENT)
Dept: RADIATION ONCOLOGY | Facility: CLINIC | Age: 74
End: 2024-06-26

## 2024-06-26 ENCOUNTER — OFFICE VISIT (OUTPATIENT)
Dept: RADIATION ONCOLOGY | Facility: CLINIC | Age: 74
End: 2024-06-26
Attending: RADIOLOGY
Payer: COMMERCIAL

## 2024-06-26 VITALS — SYSTOLIC BLOOD PRESSURE: 158 MMHG | HEART RATE: 74 BPM | DIASTOLIC BLOOD PRESSURE: 69 MMHG | RESPIRATION RATE: 16 BRPM

## 2024-06-26 VITALS
RESPIRATION RATE: 16 BRPM | SYSTOLIC BLOOD PRESSURE: 158 MMHG | HEIGHT: 71 IN | BODY MASS INDEX: 33.07 KG/M2 | DIASTOLIC BLOOD PRESSURE: 69 MMHG | WEIGHT: 236.2 LBS | TEMPERATURE: 99 F | HEART RATE: 74 BPM

## 2024-06-26 DIAGNOSIS — C79.51 MALIGNANT NEOPLASM METASTATIC TO BONE (H): Primary | ICD-10-CM

## 2024-06-26 DIAGNOSIS — C79.51 METASTASIS TO BONE (H): Primary | ICD-10-CM

## 2024-06-26 DIAGNOSIS — C61 PROSTATE CANCER (H): ICD-10-CM

## 2024-06-26 DIAGNOSIS — I10 ESSENTIAL (PRIMARY) HYPERTENSION: ICD-10-CM

## 2024-06-26 LAB
ANION GAP SERPL CALCULATED.3IONS-SCNC: 8 MMOL/L (ref 7–15)
BUN SERPL-MCNC: 21.4 MG/DL (ref 8–23)
CALCIUM SERPL-MCNC: 9.3 MG/DL (ref 8.8–10.2)
CHLORIDE SERPL-SCNC: 105 MMOL/L (ref 98–107)
CREAT SERPL-MCNC: 1.01 MG/DL (ref 0.67–1.17)
DEPRECATED HCO3 PLAS-SCNC: 29 MMOL/L (ref 22–29)
EGFRCR SERPLBLD CKD-EPI 2021: 79 ML/MIN/1.73M2
GLUCOSE SERPL-MCNC: 97 MG/DL (ref 70–99)
POTASSIUM SERPL-SCNC: 4.4 MMOL/L (ref 3.4–5.3)
SODIUM SERPL-SCNC: 142 MMOL/L (ref 135–145)
TSH SERPL DL<=0.005 MIU/L-ACNC: 6.07 UIU/ML (ref 0.3–4.2)

## 2024-06-26 PROCEDURE — 77263 THER RADIOLOGY TX PLNG CPLX: CPT | Performed by: RADIOLOGY

## 2024-06-26 PROCEDURE — G0463 HOSPITAL OUTPT CLINIC VISIT: HCPCS | Mod: 27 | Performed by: INTERNAL MEDICINE

## 2024-06-26 PROCEDURE — 36415 COLL VENOUS BLD VENIPUNCTURE: CPT | Performed by: NURSE PRACTITIONER

## 2024-06-26 PROCEDURE — G0463 HOSPITAL OUTPT CLINIC VISIT: HCPCS | Performed by: RADIOLOGY

## 2024-06-26 PROCEDURE — 84443 ASSAY THYROID STIM HORMONE: CPT | Performed by: NURSE PRACTITIONER

## 2024-06-26 PROCEDURE — 80048 BASIC METABOLIC PNL TOTAL CA: CPT | Performed by: NURSE PRACTITIONER

## 2024-06-26 PROCEDURE — P9604 ONE-WAY ALLOW PRORATED TRIP: HCPCS | Performed by: NURSE PRACTITIONER

## 2024-06-26 PROCEDURE — 99204 OFFICE O/P NEW MOD 45 MIN: CPT | Mod: 25 | Performed by: RADIOLOGY

## 2024-06-26 PROCEDURE — 99205 OFFICE O/P NEW HI 60 MIN: CPT | Performed by: INTERNAL MEDICINE

## 2024-06-26 ASSESSMENT — ENCOUNTER SYMPTOMS
EYES NEGATIVE: 1
RESPIRATORY NEGATIVE: 1
PSYCHIATRIC NEGATIVE: 1
GASTROINTESTINAL NEGATIVE: 1
CARDIOVASCULAR NEGATIVE: 1
NEUROLOGICAL NEGATIVE: 1

## 2024-06-26 ASSESSMENT — PAIN SCALES - GENERAL: PAINLEVEL: SEVERE PAIN (6)
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
## 2024-06-26 NOTE — NURSING NOTE
"Oncology Rooming Note    June 26, 2024 2:44 PM   Gucci Logan is a 74 year old male who presents for:    Chief Complaint   Patient presents with    Oncology Clinic Visit     New Onc Pt with Prostate Cancer     Initial Vitals: There were no vitals taken for this visit. Estimated body mass index is 35.33 kg/m  as calculated from the following:    Height as of 6/4/24: 1.803 m (5' 11\").    Weight as of 5/27/24: 114.9 kg (253 lb 4.8 oz). There is no height or weight on file to calculate BSA.  Data Unavailable Comment: Data Unavailable   No LMP for male patient.  Allergies reviewed: Yes  Medications reviewed: Yes    Medications: Medication refills not needed today.  Pharmacy name entered into EPIC:    [id number] - [place], [place] - [address] [facility]  [facility] - [place], [place] - [address] [facility] [address] [place] [address] - [address] [place] [address] [address]    Frailty Screening:   Is the patient here for a new oncology consult visit in cancer care? 2. No      Clinical concerns:  None      Rich Balderas              "

## 2024-06-26 NOTE — PROGRESS NOTES
HPI  INITIAL PATIENT ASSESSMENT    Diagnosis: Cancer    Prior radiation therapy: None    Prior chemotherapy: None    Prior hormonal therapy:No    Pain Eval:  Current history of pain associated with this visit:   Intensity: 6/10  Current: aching  Location: left hip  Treatment: Oxycodone    Psychosocial  Living arrangements: At TCU  Fall Risk: independent   referral needs: Not needed    Advanced Directive: No  Implantable Cardiac Device? No      Review of Systems   Constitutional:  Positive for malaise/fatigue.   HENT: Negative.     Eyes: Negative.    Respiratory: Negative.     Cardiovascular: Negative.    Gastrointestinal: Negative.    Genitourinary: Negative.    Musculoskeletal:  Positive for joint pain.        Hip pain   Skin: Negative.    Neurological: Negative.    Endo/Heme/Allergies: Negative.    Psychiatric/Behavioral: Negative.

## 2024-06-26 NOTE — LETTER
6/26/2024      Gucci Logan  [address] [facility] [address]      Dear Colleague,    Thank you for referring your patient, Gucci Logan, to the [facility] RADIATION ONCOLOGY. Please see a copy of my visit note below.      RADIATION ONCOLOGY CONSULTATION  [facility]    DATE:  6/26/2024     PATIENT NAME: Gucci Logan  MEDICAL RECORD NUMBER: [id number]    REFERRING PHYSICIAN:  Dr. Jaramillo    REASON FOR CONSULTATION: Consideration of  palliative radiotherapy for management of adenocarcinoma of the prostate.    Staging:  Clinical  M1  Pathology:    ORIF Left prox femur  metastatic prostate cancer         PSA:       Prostate Specific Antigen Screen   Date Value   08/29/2023 1.49   08/15/2022 1.66   11/12/2021 1.70     01/12/2021 1.81   11/05/2019 3.24   08/10/2018 3.54   07/24/2015 2.90   01/15/2014 2.40         HISTORY OF PRESENT ILLNESS: The patient is a 74 year old  man who underwent ORIF for  high subtrochanteric fracture of the left proximal femur(5/20/2024) after a fall.     Pelvic Xray (5/19/2024) showed left femur fracture and he underwent ORIF @ [facility]      The pathology from the surgery showed metastatic adenocarcinoma of prostate. Stains performed include CK AE1/AE3 (positive), CK7(negative),CK20(negative), NKX3.1(positive), PAX8(negative), TTF-1(negative), GATA3(negative), SATB2(negative), CDX2(negative), and  arginase(negative). The staining profile was consistent with metastatic adenocarcinoma of the prostate    The patient also underwent a BM biopsy for chronic anemia with thrombocytopenia(5/22/2024) and it showed no increase in myeloid blasts and no abnormal myeloid blast population.    The PSA is pending with last PSA of 1.49(8/29/2023).    PSMA PET/CT scan is scheduled for 7/3/2024    The patient is recovering from the surgery without complications.  He has left hip discomfort occasionally but is significantly  improved and had no acute complaint.  No    PMH:   Past Medical History:   Diagnosis Date     Arthritis      Chronic, continuous use of opioids      Esophageal reflux 03/01/2014     Hearing problem      Hiatal hernia      Hypertension      Jaundice 05/31/2008     Liver disease      Obesity 01/24/2013     Obstructive sleep apnea      Sleep apnea     Advised CPAP machine. Not keen to use it.      Syncope, unspecified syncope type 2/20/2023       PSH:  Past Surgical History:   Procedure Laterality Date     ARTHROSCOPY KNEE RT/LT      (L) with partial medial meniscectomy     CATARACT IOL, RT/LT  Nov and Dec 2017     COLONOSCOPY N/A 4/24/2019    Procedure: COLONOSCOPY, WITH POLYPECTOMY AND BIOPSY;  Surgeon: Leventhal, Thomas Michael, MD;  [facility]: [facility]     COLONOSCOPY N/A 9/14/2022    Procedure: COLONOSCOPY;  Surgeon: Rafa Renee MD;  Location:  GI     DACRYOCYSTORHINOSTOMY Left 10/16/2018    Procedure: DACRYOCYSTORHINOSTOMY;  Surgeon: Madhu Krause MD;  Location: [facility]     ENDOSCOPIC ENDONASAL SURGERY  1994     ENDOSCOPY  2-19-15     ESOPHAGOSCOPY, GASTROSCOPY, DUODENOSCOPY (EGD), COMBINED N/A 4/24/2019    Procedure: COMBINED ESOPHAGOSCOPY, GASTROSCOPY, DUODENOSCOPY (EGD) - hold aspirin ibuprofen or naproxen for one week prior (per physician order);  Surgeon: Leventhal, Thomas Michael, MD;  Location:  OR     ESOPHAGOSCOPY, GASTROSCOPY, DUODENOSCOPY (EGD), COMBINED N/A 5/23/2023    Procedure: Esophagoscopy, gastroscopy, duodenoscopy, combined;  Surgeon: Rafa Renee MD;  Location:  GI     EYE SURGERY       Hernia surgery Left 1994     NASAL/SINUS POLYPECTOMY       OPEN REDUCTION INTERNAL FIXATION HIP NAILING Left 5/20/2024    Procedure: open reduction internal fixation hip nailing left;  Surgeon: Rafa Wagner MD;  Location:  OR     REPAIR PTOSIS Left 10/16/2018    Procedure: LEFT UPPER LID PTOSIS AND BILATERAL  BROW PTOSIS REPAIR WITH LEFT DACRYOCYSTORHINOSTOMY ;  Surgeon: Madhu Krause  MD [place];  Location: [facility]     REPAIR PTOSIS BROW Bilateral 10/16/2018    Procedure: REPAIR PTOSIS BROW;  Surgeon: Madhu Krause MD;  Location:  [facility]     ROTATOR CUFF REPAIR RT/LT Right      ZZC OPEN RX ANKLE DISLOCATN+FIXATN      (R)     ZZC SPINAL FUSION,ANT,EA ADNL LEVEL      T12 - L1       MEDICATIONS:  Current Outpatient Medications   Medication Sig Dispense Refill     acetaminophen (TYLENOL) 325 MG tablet Take 2 tablets (650 mg) by mouth every 4 hours as needed for other (For optimal non-opioid multimodal pain management to improve pain control.) 100 tablet 0     apixaban ANTICOAGULANT (ELIQUIS) 2.5 MG tablet Take 1 tablet (2.5 mg) by mouth 2 times daily for 42 days 84 tablet 0     atorvastatin (LIPITOR) 10 MG tablet Take 1 tablet (10 mg) by mouth daily 90 tablet 1     diclofenac (VOLTAREN) 1 % topical gel Apply 4 g topically 3 times daily as needed for moderate pain (bursitis) 150 g 1     hydrochlorothiazide (HYDRODIURIL) 12.5 MG tablet Take 1 tablet (12.5 mg) by mouth daily 90 tablet 1     levothyroxine (SYNTHROID/LEVOTHROID) 25 MCG tablet Take 1 tablet (25 mcg) by mouth daily 90 tablet 1     metFORMIN (GLUCOPHAGE) 500 MG tablet Take 1 tablet (500 mg) by mouth 2 times daily (with meals) (Patient taking differently: Take 1,000 mg by mouth daily (with breakfast)) 180 tablet 1     metoprolol succinate ER (TOPROL XL) 100 MG 24 hr tablet 1 1/2 ( 150 mg ) po daily (Patient taking differently: Take 150 mg by mouth daily) 135 tablet 1     multivitamin w/minerals (THERA-VIT-M) tablet Take 1 tablet by mouth daily       omeprazole (PRILOSEC) 20 MG DR capsule TAKE 1 CAPSULE BY MOUTH ONCE DAILY 30 TO 60 MINUTES BEFORE A MEAL 90 capsule 1     oxyCODONE (ROXICODONE) 5 MG tablet Take 1-2 tablets (5-10 mg) by mouth every 4 hours as needed for moderate to severe pain 26 tablet 0     senna-docusate (SENOKOT-S/PERICOLACE) 8.6-50 MG tablet Take 1 tablet by mouth 2 times daily as needed for constipation 30 tablet 1      spironolactone (ALDACTONE) 25 MG tablet Take 1 tablet (25 mg) by mouth daily 90 tablet 1     tiZANidine (ZANAFLEX) 4 MG tablet TAKE 1/2 (ONE-HALF) TO 1  TABLET BY MOUTH UP TO THREE TIMES DAILY AS NEEDED (Patient taking differently: Take 2-4 mg by mouth 3 times daily as needed for muscle spasms Usually takes at 4pm with tramadol) 30 tablet 0     valsartan (DIOVAN) 160 MG tablet Take 1 tablet (160 mg) by mouth daily 90 tablet 1       ALLERGY:  Allergies   Allergen Reactions     Bee Venom Swelling     Gum swelling     Haemophilus B Polysaccharide Vaccine Other (See Comments)     PN: delirium  fever     Haemophilus Influenzae Nausea and Other (See Comments)     PN: delirium  fever    Other Reaction(s): Fever    PN: delirium  fever   PN: delirium  fever     Pneumococcal Vaccine      Other Reaction(s): *Unknown, adverse reaction       FAMILY HISTORY:  Family History   Problem Relation Age of Onset     Alzheimer Disease Mother 85     Dementia Mother      Cerebrovascular Disease Father 50     Cancer Father      Hypertension Father      Neurologic Disorder No family hx of      Diabetes No family hx of        SOCIAL HISTORY  Social History     Tobacco Use     Smoking status: Former     Current packs/day: 0.00     Types: Cigarettes     Start date: 1990     Quit date: 1999     Years since quittin.4     Passive exposure: Never     Smokeless tobacco: Never   Substance Use Topics     Alcohol use: Yes     Comment: rare        ROS: 10 point ROS reviewed as reported on the nursing assessment note.      PHYSICAL EXAMINATION:  VS: Vitals: BP (!) 158/69   Pulse 74   Resp 16   BMI= There is no height or weight on file to calculate BMI.  Alert and Oriented X 3.  Lungs: clear  Left hip well healed     IMPRESSION:  Metastatic adenocarcinoma of prostate.     RECOMMENDATION: I recommend palliative radiotherapy to the left hip.  The target for the treatment include the surgical site including the hardware.  I recommend 5  sessions of radiotherapy directed at the left hip using 2 fields.  The patient will first have a follow-up with Dr. Wagner of orthopedic surgery prior to initiating plan for radiotherapy.    I explained the benefits of radiotherapy as well as related toxicities. I described the early and late toxicities associated with radiotherapy. The toxicities are itemized on the consent form .    BETTINA Gan M.D.  Department of Radiation Oncology  [facility]       HPI  INITIAL PATIENT ASSESSMENT    Diagnosis: Cancer    Prior radiation therapy: None    Prior chemotherapy: None    Prior hormonal therapy:No    Pain Eval:  Current history of pain associated with this visit:   Intensity: 6/10  Current: aching  Location: left hip  Treatment: Oxycodone    Psychosocial  Living arrangements: At TCU  Fall Risk: independent   referral needs: Not needed    Advanced Directive: No  Implantable Cardiac Device? No      Review of Systems   Constitutional:  Positive for malaise/fatigue.   HENT: Negative.     Eyes: Negative.    Respiratory: Negative.     Cardiovascular: Negative.    Gastrointestinal: Negative.    Genitourinary: Negative.    Musculoskeletal:  Positive for joint pain.        Hip pain   Skin: Negative.    Neurological: Negative.    Endo/Heme/Allergies: Negative.    Psychiatric/Behavioral: Negative.                   Again, thank you for allowing me to participate in the care of your patient.        Sincerely,        Subha Gan MD

## 2024-06-27 ENCOUNTER — LAB REQUISITION (OUTPATIENT)
Dept: LAB | Facility: CLINIC | Age: 74
End: 2024-06-27

## 2024-06-28 LAB
ALBUMIN SERPL BCG-MCNC: 3 G/DL (ref 3.5–5.2)
ALP SERPL-CCNC: 146 U/L (ref 40–150)
ALT SERPL W P-5'-P-CCNC: 20 U/L (ref 0–70)
ANION GAP SERPL CALCULATED.3IONS-SCNC: 9 MMOL/L (ref 7–15)
AST SERPL W P-5'-P-CCNC: 35 U/L (ref 0–45)
BILIRUB SERPL-MCNC: 1.5 MG/DL
BUN SERPL-MCNC: 20.4 MG/DL (ref 8–23)
CALCIUM SERPL-MCNC: 8.9 MG/DL (ref 8.8–10.2)
CHLORIDE SERPL-SCNC: 108 MMOL/L (ref 98–107)
CREAT SERPL-MCNC: 0.93 MG/DL (ref 0.67–1.17)
DEPRECATED HCO3 PLAS-SCNC: 26 MMOL/L (ref 22–29)
EGFRCR SERPLBLD CKD-EPI 2021: 87 ML/MIN/1.73M2
ERYTHROCYTE [DISTWIDTH] IN BLOOD BY AUTOMATED COUNT: 14 % (ref 10–15)
GLUCOSE SERPL-MCNC: 103 MG/DL (ref 70–99)
HCT VFR BLD AUTO: 29.8 % (ref 40–53)
HGB BLD-MCNC: 9.5 G/DL (ref 13.3–17.7)
MCH RBC QN AUTO: 30.9 PG (ref 26.5–33)
MCHC RBC AUTO-ENTMCNC: 31.9 G/DL (ref 31.5–36.5)
MCV RBC AUTO: 97 FL (ref 78–100)
PLATELET # BLD AUTO: 57 10E3/UL (ref 150–450)
POTASSIUM SERPL-SCNC: 4.4 MMOL/L (ref 3.4–5.3)
PROT SERPL-MCNC: 5.9 G/DL (ref 6.4–8.3)
PSA SERPL DL<=0.01 NG/ML-MCNC: 8.81 NG/ML (ref 0–6.5)
RBC # BLD AUTO: 3.07 10E6/UL (ref 4.4–5.9)
SODIUM SERPL-SCNC: 143 MMOL/L (ref 135–145)
WBC # BLD AUTO: 2.9 10E3/UL (ref 4–11)

## 2024-06-28 PROCEDURE — 80053 COMPREHEN METABOLIC PANEL: CPT | Performed by: FAMILY MEDICINE

## 2024-06-28 PROCEDURE — 84153 ASSAY OF PSA TOTAL: CPT | Performed by: FAMILY MEDICINE

## 2024-06-28 PROCEDURE — 84403 ASSAY OF TOTAL TESTOSTERONE: CPT | Performed by: FAMILY MEDICINE

## 2024-06-28 PROCEDURE — 36415 COLL VENOUS BLD VENIPUNCTURE: CPT | Performed by: FAMILY MEDICINE

## 2024-06-28 PROCEDURE — P9604 ONE-WAY ALLOW PRORATED TRIP: HCPCS | Performed by: FAMILY MEDICINE

## 2024-06-28 PROCEDURE — 85027 COMPLETE CBC AUTOMATED: CPT | Performed by: FAMILY MEDICINE

## 2024-07-01 NOTE — TELEPHONE ENCOUNTER
I called patient and discussed in detail    Off spironolactone and hydrochlorothiazide for a month and blood pressure okay  Stopped omeprazole, thyroid pill, and statin during the spring    In TCU currently following hip surgery     Patient will be in TCU for at least another two weeks    He then needs radiation for the cancer     He will schedule a lab only appointment     I put in orders     Richi Jaramillo MD

## 2024-07-02 LAB — TESTOST SERPL-MCNC: 263 NG/DL (ref 240–950)

## 2024-07-03 ENCOUNTER — HOSPITAL ENCOUNTER (OUTPATIENT)
Dept: PET IMAGING | Facility: CLINIC | Age: 74
Setting detail: NUCLEAR MEDICINE
Discharge: HOME | End: 2024-07-03
Attending: INTERNAL MEDICINE | Admitting: INTERNAL MEDICINE
Payer: COMMERCIAL

## 2024-07-03 DIAGNOSIS — C61 PROSTATE CANCER (H): ICD-10-CM

## 2024-07-03 PROCEDURE — 78815 PET IMAGE W/CT SKULL-THIGH: CPT | Mod: 26 | Performed by: RADIOLOGY

## 2024-07-03 PROCEDURE — 74177 CT ABD & PELVIS W/CONTRAST: CPT | Mod: 26 | Performed by: RADIOLOGY

## 2024-07-03 PROCEDURE — 343N000001 HC RX 343: Performed by: INTERNAL MEDICINE

## 2024-07-03 PROCEDURE — 250N000011 HC RX IP 250 OP 636: Performed by: INTERNAL MEDICINE

## 2024-07-03 PROCEDURE — 78815 PET IMAGE W/CT SKULL-THIGH: CPT | Mod: PS

## 2024-07-03 PROCEDURE — A9596 HC RX 343: HCPCS | Performed by: INTERNAL MEDICINE

## 2024-07-03 PROCEDURE — 71260 CT THORAX DX C+: CPT | Mod: 26 | Performed by: RADIOLOGY

## 2024-07-03 PROCEDURE — 71260 CT THORAX DX C+: CPT

## 2024-07-03 RX ORDER — IOPAMIDOL 755 MG/ML
10-135 INJECTION, SOLUTION INTRAVASCULAR ONCE
Status: COMPLETED | OUTPATIENT
Start: 2024-07-03 | End: 2024-07-03

## 2024-07-03 RX ADMIN — IOPAMIDOL 135 ML: 755 INJECTION, SOLUTION INTRAVENOUS at 14:37

## 2024-07-03 RX ADMIN — KIT FOR THE PREPARATION OF GALLIUM GA 68 GOZETOTIDE INJECTION 5.26 MILLICURIE: KIT INTRAVENOUS at 13:41

## 2024-07-04 DIAGNOSIS — E03.9 HYPOTHYROIDISM, UNSPECIFIED TYPE: ICD-10-CM

## 2024-07-04 RX ORDER — LEVOTHYROXINE SODIUM 25 UG/1
25 TABLET ORAL DAILY
Qty: 90 TABLET | Refills: 1 | Status: SHIPPED | OUTPATIENT
Start: 2024-07-04 | End: 2024-07-25

## 2024-07-05 ENCOUNTER — TELEPHONE (OUTPATIENT)
Dept: FAMILY MEDICINE | Facility: CLINIC | Age: 74
End: 2024-07-05
Payer: COMMERCIAL

## 2024-07-05 NOTE — TELEPHONE ENCOUNTER
Patient Quality Outreach    Patient is due for the following:   Physical Annual Wellness Visit    Next Steps:   Schedule a Annual Wellness Visit    Type of outreach:    Sent letter.    Next Steps:  Reach out within 90 days via Phone.    Max number of attempts reached: Yes. Will try again in 90 days if patient still on fail list.    Questions for provider review:    None           Sofia Conde, [facility]  Chart routed to chart closed.

## 2024-07-05 NOTE — LETTER
July 5, 2024    To  Gucci Logan  [address] [facility] [address]    Your team at [facility] cares about your health. We have reviewed your chart and based on our findings; we are making the following recommendations to better manage your health.     You are in particular need of attention regarding the following:     PREVENTATIVE VISIT: Annual Medicare Wellness:Schedule an Annual Medicare Wellness Exam. Please call your [facility] clinic to set up your appointment.    If you have already completed these items, please contact the clinic via phone or   MyChart so your care team can review and update your records. Thank you for   choosing [facility] for your healthcare needs. For any questions,   concerns, or to schedule an appointment please contact our clinic.    Healthy Regards,      Your [facility] Care Team

## 2024-07-08 ENCOUNTER — OFFICE VISIT (OUTPATIENT)
Dept: ORTHOPEDICS | Facility: CLINIC | Age: 74
End: 2024-07-08
Payer: COMMERCIAL

## 2024-07-08 ENCOUNTER — ANCILLARY PROCEDURE (OUTPATIENT)
Dept: GENERAL RADIOLOGY | Facility: CLINIC | Age: 74
End: 2024-07-08
Attending: ORTHOPAEDIC SURGERY
Payer: COMMERCIAL

## 2024-07-08 VITALS — BODY MASS INDEX: 32.22 KG/M2 | SYSTOLIC BLOOD PRESSURE: 161 MMHG | WEIGHT: 231 LBS | DIASTOLIC BLOOD PRESSURE: 72 MMHG

## 2024-07-08 DIAGNOSIS — S72.142D CLOSED INTERTROCHANTERIC FRACTURE OF LEFT FEMUR WITH ROUTINE HEALING, SUBSEQUENT ENCOUNTER: ICD-10-CM

## 2024-07-08 DIAGNOSIS — S72.142D CLOSED INTERTROCHANTERIC FRACTURE OF LEFT FEMUR WITH ROUTINE HEALING, SUBSEQUENT ENCOUNTER: Primary | ICD-10-CM

## 2024-07-08 PROCEDURE — 99024 POSTOP FOLLOW-UP VISIT: CPT | Performed by: ORTHOPAEDIC SURGERY

## 2024-07-08 PROCEDURE — 73502 X-RAY EXAM HIP UNI 2-3 VIEWS: CPT | Mod: TC | Performed by: RADIOLOGY

## 2024-07-08 NOTE — PROGRESS NOTES
S:Doing well.  Has been careful with weight bearing LLE.  Has also been working on ROM LLE.           Patient Active Problem List   Diagnosis    Hypertension goal BP (blood pressure) < 140/90    Obesity, unspecified obesity severity, unspecified obesity type    Gastroesophageal reflux disease, esophagitis presence not specified    Hyperlipidemia LDL goal <100    Impaired fasting glucose    Diabetes mellitus, type 2 (H)    Non-insulin dependent type 2 diabetes mellitus (H)    Abdominal pain    Portal vein thrombosis    Vitamin D deficiency    Thrombocytopenia (H24)    Splenomegaly    Osteoarthrosis    SHONDA (obstructive sleep apnea)    Lymphadenopathy    Left ventricular hypertrophy    Jaundice    Chronic hepatitis C virus infection (H)    Compression fracture of T6 vertebra with routine healing    Syncope, unspecified syncope type    Fall    Compression fracture of T5 vertebra with routine healing, subsequent encounter    Diabetes mellitus without complication (H)    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)    Stage 3 chronic kidney disease, unspecified whether stage 3a or 3b CKD (H)    Glaucoma suspect of both eyes    Fall from standing, initial encounter    Intertrochanteric fracture of left femur, closed, initial encounter (H)    Chronic nonalcoholic liver disease    Fall on same level from slipping, tripping or stumbling, initial encounter    Diverticular disease of colon    Chronic hepatitis C (H)    Cirrhosis of liver (H)    Class 2 severe obesity due to excess calories with serious comorbidity in adult (H)    Metastasis to bone (H)    Prostate cancer (H)            Past Medical History:   Diagnosis Date    Arthritis     Chronic, continuous use of opioids     Esophageal reflux 03/01/2014    Hearing problem     Hiatal hernia     Hypertension     Jaundice 05/31/2008    Liver disease     Obesity 01/24/2013    Obstructive sleep apnea     Sleep apnea      Advised CPAP machine. Not keen to use it.     Syncope, unspecified syncope type 2/20/2023            Past Surgical History:   Procedure Laterality Date    ARTHROSCOPY KNEE RT/LT      (L) with partial medial meniscectomy    CATARACT IOL, RT/LT  Nov and Dec 2017    COLONOSCOPY N/A 4/24/2019    Procedure: COLONOSCOPY, WITH POLYPECTOMY AND BIOPSY;  Surgeon: Leventhal, Thomas Michael, MD;  [facility]    COLONOSCOPY N/A 9/14/2022    Procedure: COLONOSCOPY;  Surgeon: Rafa Renee MD;  [facility]    DACRYOCYSTORHINOSTOMY Left 10/16/2018    Procedure: DACRYOCYSTORHINOSTOMY;  Surgeon: Madhu Krause MD;  Location: [facility]    ENDOSCOPIC ENDONASAL SURGERY  1994    ENDOSCOPY  2-19-15    ESOPHAGOSCOPY, GASTROSCOPY, DUODENOSCOPY (EGD), COMBINED N/A 4/24/2019    Procedure: COMBINED ESOPHAGOSCOPY, GASTROSCOPY, DUODENOSCOPY (EGD) - hold aspirin ibuprofen or naproxen for one week prior (per physician order);  Surgeon: Leventhal, Thomas Michael, MD;  [facility]    ESOPHAGOSCOPY, GASTROSCOPY, DUODENOSCOPY (EGD), COMBINED N/A 5/23/2023    Procedure: Esophagoscopy, gastroscopy, duodenoscopy, combined;  Surgeon: Rafa Renee MD;  [facility]    EYE SURGERY      Hernia surgery Left 1994    NASAL/SINUS POLYPECTOMY      OPEN REDUCTION INTERNAL FIXATION HIP NAILING Left 5/20/2024    Procedure: open reduction internal fixation hip nailing left;  Surgeon: Rafa Wagner MD;  [facility]    REPAIR PTOSIS Left 10/16/2018    Procedure: LEFT UPPER LID PTOSIS AND BILATERAL  BROW PTOSIS REPAIR WITH LEFT DACRYOCYSTORHINOSTOMY ;  Surgeon: Madhu Krause MD;  Location: [facility]    REPAIR PTOSIS BROW Bilateral 10/16/2018    Procedure: REPAIR PTOSIS BROW;  Surgeon: Madhu Krause MD;  Location: [facility]    ROTATOR CUFF REPAIR RT/LT Right     ZZC OPEN RX ANKLE DISLOCATN+FIXATN      (R)    ZZC SPINAL FUSION,ANT,EA ADNL LEVEL      T12 - L1            Social History     Tobacco Use    Smoking status: Former      Current packs/day: 0.00     Types: Cigarettes     Start date: 1990     Quit date: 1999     Years since quittin.5     Passive exposure: Never    Smokeless tobacco: Never   Substance Use Topics    Alcohol use: Yes     Comment: rare            Family History   Problem Relation Age of Onset    Alzheimer Disease Mother 85    Dementia Mother     Cerebrovascular Disease Father 50    Cancer Father     Hypertension Father     Neurologic Disorder No family hx of     Diabetes No family hx of                Allergies   Allergen Reactions    Bee Venom Swelling     Gum swelling    Haemophilus B Polysaccharide Vaccine Other (See Comments)     PN: delirium  fever    Haemophilus Influenzae Nausea and Other (See Comments)     PN: delirium  fever    Other Reaction(s): Fever    PN: delirium  fever   PN: delirium  fever    Pneumococcal Vaccine      Other Reaction(s): *Unknown, adverse reaction            Current Outpatient Medications   Medication Sig Dispense Refill    acetaminophen (TYLENOL) 325 MG tablet Take 2 tablets (650 mg) by mouth every 4 hours as needed for other (For optimal non-opioid multimodal pain management to improve pain control.) 100 tablet 0    atorvastatin (LIPITOR) 10 MG tablet Take 1 tablet (10 mg) by mouth daily 90 tablet 1    diclofenac (VOLTAREN) 1 % topical gel Apply 4 g topically 3 times daily as needed for moderate pain (bursitis) 150 g 1    hydrochlorothiazide (HYDRODIURIL) 12.5 MG tablet Take 1 tablet (12.5 mg) by mouth daily 90 tablet 1    levothyroxine (SYNTHROID/LEVOTHROID) 25 MCG tablet Take 1 tablet (25 mcg) by mouth daily 90 tablet 1    metFORMIN (GLUCOPHAGE) 500 MG tablet Take 1 tablet (500 mg) by mouth 2 times daily (with meals) (Patient taking differently: Take 1,000 mg by mouth daily (with breakfast)) 180 tablet 1    metoprolol succinate ER (TOPROL XL) 100 MG 24 hr tablet 1 1/2 ( 150 mg ) po daily (Patient taking differently: Take 150 mg by mouth daily) 135 tablet 1    multivitamin  w/minerals (THERA-VIT-M) tablet Take 1 tablet by mouth daily      omeprazole (PRILOSEC) 20 MG DR capsule TAKE 1 CAPSULE BY MOUTH ONCE DAILY 30 TO 60 MINUTES BEFORE A MEAL 90 capsule 1    oxyCODONE (ROXICODONE) 5 MG tablet Take 1-2 tablets (5-10 mg) by mouth every 4 hours as needed for moderate to severe pain 26 tablet 0    senna-docusate (SENOKOT-S/PERICOLACE) 8.6-50 MG tablet Take 1 tablet by mouth 2 times daily as needed for constipation 30 tablet 1    spironolactone (ALDACTONE) 25 MG tablet Take 1 tablet (25 mg) by mouth daily 90 tablet 1    tiZANidine (ZANAFLEX) 4 MG tablet TAKE 1/2 (ONE-HALF) TO 1  TABLET BY MOUTH UP TO THREE TIMES DAILY AS NEEDED (Patient taking differently: Take 2-4 mg by mouth 3 times daily as needed for muscle spasms Usually takes at 4pm with tramadol) 30 tablet 0    valsartan (DIOVAN) 160 MG tablet Take 1 tablet (160 mg) by mouth daily 90 tablet 1          Review Of Systems  Skin: negative  Eyes: negative  Ears/Nose/Throat: negative  Respiratory: No shortness of breath, dyspnea on exertion, cough, or hemoptysis    O: Physical Exam:  Wounds healing well.  Some erythema with fibrous tissue small lesion distal aspect most proximal wound.  Other wounds appear supple and well epithelialized.  CMS intact to LLE.  Hip supple to motion.  Adequate flex/ext/abduction LLE against gravity, active IR/ER as well.    Lab:Triglyc 182, Alk phos 164, Hgb 9.5    Images:Evidence healing well L subtroch femur fx and adequate fixation   CT Chest/Abdomen/Pelvis w Contrast Final result 7/3/2024 3:10 PM          Narrative   Combined Report of: PET and CT on 7/3/2024 3:10 PM:    1. PET of the neck, chest, abdomen, and pelvis.  2. PET CT Fusion for Attenuation Correction and Anatomical  Localization.  3. Diagnostic CT of the chest, abdomen and pelvis with intravenous  contrast obtained for diagnostic interpretation.  4. 3D MIP and PET-CT fused images were processed on an independent  workstation and archived to PACS  and reviewed by a radiologist.   ...   Impression   IMPRESSION:    1. High PSMA receptor expression in several metastatic lesions in the  axial and proximal appendicular skeleton (~20), retroperitoneal and  pelvic metastatic adenopathy. Mildly avid subcarinal node is also  likely metastatic.    2. Heterogenous PSMA uptake in the prostate gland without discrete  focality.        IMPRESSION:     1. High PSMA receptor expression in several metastatic lesions in the  axial and proximal appendicular skeleton (~20), retroperitoneal and  pelvic metastatic adenopathy. Mildly avid subcarinal node is also  likely metastatic.     2. Heterogenous PSMA uptake in the prostate gland without discrete  focality.     3. Cirrhotic liver morphology with features of portal hypertension  including splenomegaly and dilated upper abdominal portosystemic  collaterals.     4. Incidental bilateral thyroid nodules can be characterization with  thyroid ultrasound.         A:  Metastatic adenocarcinoma being treated by rad onc/hem onc, L hip fx/subtroch pathologic healing well after ORIF and cephalomedullary fixation      P:  Okay to WBAT, continue rehab  See back with new images 6 weeks either this clinic or N with Leanne Laguna/Shawn/Linda as he may be moving to [place]  Notify if exacerbation symptoms               In addition to the above assessment and plan each active problem on Gucci's problem list was evaluated today. This included the questioning of Gucci for any medication problems. We will continue the current treatment plan for these active problems except as noted.

## 2024-07-08 NOTE — LETTER
7/8/2024      Gucci Logan  [address] [facility] [address]      Dear Colleague,    Thank you for referring your patient, Gucci Logan, to the [facility]. Please see a copy of my visit note below.    S:Doing well.  Has been careful with weight bearing LLE.  Has also been working on ROM LLE.           Patient Active Problem List   Diagnosis     Hypertension goal BP (blood pressure) < 140/90     Obesity, unspecified obesity severity, unspecified obesity type     Gastroesophageal reflux disease, esophagitis presence not specified     Hyperlipidemia LDL goal <100     Impaired fasting glucose     Diabetes mellitus, type 2 (H)     Non-insulin dependent type 2 diabetes mellitus (H)     Abdominal pain     Portal vein thrombosis     Vitamin D deficiency     Thrombocytopenia (H24)     Splenomegaly     Osteoarthrosis     SHONDA (obstructive sleep apnea)     Lymphadenopathy     Left ventricular hypertrophy     Jaundice     Chronic hepatitis C virus infection (H)     Compression fracture of T6 vertebra with routine healing     Syncope, unspecified syncope type     Fall     Compression fracture of T5 vertebra with routine healing, subsequent encounter     Diabetes mellitus without complication (H)     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)     Stage 3 chronic kidney disease, unspecified whether stage 3a or 3b CKD (H)     Glaucoma suspect of both eyes     Fall from standing, initial encounter     Intertrochanteric fracture of left femur, closed, initial encounter (H)     Chronic nonalcoholic liver disease     Fall on same level from slipping, tripping or stumbling, initial encounter     Diverticular disease of colon     Chronic hepatitis C (H)     Cirrhosis of liver (H)     Class 2 severe obesity due to excess calories with serious comorbidity in adult (H)     Metastasis to bone (H)     Prostate cancer (H)            Past  Medical History:   Diagnosis Date     Arthritis      Chronic, continuous use of opioids      Esophageal reflux 03/01/2014     Hearing problem      Hiatal hernia      Hypertension      Jaundice 05/31/2008     Liver disease      Obesity 01/24/2013     Obstructive sleep apnea      Sleep apnea     Advised CPAP machine. Not keen to use it.      Syncope, unspecified syncope type 2/20/2023            Past Surgical History:   Procedure Laterality Date     ARTHROSCOPY KNEE RT/LT      (L) with partial medial meniscectomy     CATARACT IOL, RT/LT  Nov and Dec 2017     COLONOSCOPY N/A 4/24/2019    Procedure: COLONOSCOPY, WITH POLYPECTOMY AND BIOPSY;  Surgeon: Leventhal, Thomas Michael, MD;  [facility]     COLONOSCOPY N/A 9/14/2022    Procedure: COLONOSCOPY;  Surgeon: Rafa Renee MD;  [facility]     DACRYOCYSTORHINOSTOMY Left 10/16/2018    Procedure: DACRYOCYSTORHINOSTOMY;  Surgeon: Madhu Krause MD;  Location: [facility]     ENDOSCOPIC ENDONASAL SURGERY  1994     ENDOSCOPY  2-19-15     ESOPHAGOSCOPY, GASTROSCOPY, DUODENOSCOPY (EGD), COMBINED N/A 4/24/2019    Procedure: COMBINED ESOPHAGOSCOPY, GASTROSCOPY, DUODENOSCOPY (EGD) - hold aspirin ibuprofen or naproxen for one week prior (per physician order);  Surgeon: Leventhal, Thomas Michael, MD;  [facility]     ESOPHAGOSCOPY, GASTROSCOPY, DUODENOSCOPY (EGD), COMBINED N/A 5/23/2023    Procedure: Esophagoscopy, gastroscopy, duodenoscopy, combined;  Surgeon: Rafa Renee MD;  [facility]     EYE SURGERY       Hernia surgery Left 1994     NASAL/SINUS POLYPECTOMY       OPEN REDUCTION INTERNAL FIXATION HIP NAILING Left 5/20/2024    Procedure: open reduction internal fixation hip nailing left;  Surgeon: Rafa Wagner MD;  Location:  OR     REPAIR PTOSIS Left 10/16/2018    Procedure: LEFT UPPER LID PTOSIS AND BILATERAL  BROW PTOSIS REPAIR WITH LEFT DACRYOCYSTORHINOSTOMY ;  Surgeon: Madhu Krause MD;  Location: [facility]     REPAIR PTOSIS BROW Bilateral  10/16/2018    Procedure: REPAIR PTOSIS BROW;  Surgeon: Madhu Krause MD;  [facility]     ROTATOR CUFF REPAIR RT/LT Right      ZZC OPEN RX ANKLE DISLOCATN+FIXATN      (R)     ZZC SPINAL FUSION,ANT,EA ADNL LEVEL      T12 - L1            Social History     Tobacco Use     Smoking status: Former     Current packs/day: 0.00     Types: Cigarettes     Start date: 1990     Quit date: 1999     Years since quittin.5     Passive exposure: Never     Smokeless tobacco: Never   Substance Use Topics     Alcohol use: Yes     Comment: rare            Family History   Problem Relation Age of Onset     Alzheimer Disease Mother 85     Dementia Mother      Cerebrovascular Disease Father 50     Cancer Father      Hypertension Father      Neurologic Disorder No family hx of      Diabetes No family hx of                Allergies   Allergen Reactions     Bee Venom Swelling     Gum swelling     Haemophilus B Polysaccharide Vaccine Other (See Comments)     PN: delirium  fever     Haemophilus Influenzae Nausea and Other (See Comments)     PN: delirium  fever    Other Reaction(s): Fever    PN: delirium  fever   PN: delirium  fever     Pneumococcal Vaccine      Other Reaction(s): *Unknown, adverse reaction            Current Outpatient Medications   Medication Sig Dispense Refill     acetaminophen (TYLENOL) 325 MG tablet Take 2 tablets (650 mg) by mouth every 4 hours as needed for other (For optimal non-opioid multimodal pain management to improve pain control.) 100 tablet 0     atorvastatin (LIPITOR) 10 MG tablet Take 1 tablet (10 mg) by mouth daily 90 tablet 1     diclofenac (VOLTAREN) 1 % topical gel Apply 4 g topically 3 times daily as needed for moderate pain (bursitis) 150 g 1     hydrochlorothiazide (HYDRODIURIL) 12.5 MG tablet Take 1 tablet (12.5 mg) by mouth daily 90 tablet 1     levothyroxine (SYNTHROID/LEVOTHROID) 25 MCG tablet Take 1 tablet (25 mcg) by mouth daily 90 tablet 1     metFORMIN (GLUCOPHAGE) 500  MG tablet Take 1 tablet (500 mg) by mouth 2 times daily (with meals) (Patient taking differently: Take 1,000 mg by mouth daily (with breakfast)) 180 tablet 1     metoprolol succinate ER (TOPROL XL) 100 MG 24 hr tablet 1 1/2 ( 150 mg ) po daily (Patient taking differently: Take 150 mg by mouth daily) 135 tablet 1     multivitamin w/minerals (THERA-VIT-M) tablet Take 1 tablet by mouth daily       omeprazole (PRILOSEC) 20 MG DR capsule TAKE 1 CAPSULE BY MOUTH ONCE DAILY 30 TO 60 MINUTES BEFORE A MEAL 90 capsule 1     oxyCODONE (ROXICODONE) 5 MG tablet Take 1-2 tablets (5-10 mg) by mouth every 4 hours as needed for moderate to severe pain 26 tablet 0     senna-docusate (SENOKOT-S/PERICOLACE) 8.6-50 MG tablet Take 1 tablet by mouth 2 times daily as needed for constipation 30 tablet 1     spironolactone (ALDACTONE) 25 MG tablet Take 1 tablet (25 mg) by mouth daily 90 tablet 1     tiZANidine (ZANAFLEX) 4 MG tablet TAKE 1/2 (ONE-HALF) TO 1  TABLET BY MOUTH UP TO THREE TIMES DAILY AS NEEDED (Patient taking differently: Take 2-4 mg by mouth 3 times daily as needed for muscle spasms Usually takes at 4pm with tramadol) 30 tablet 0     valsartan (DIOVAN) 160 MG tablet Take 1 tablet (160 mg) by mouth daily 90 tablet 1          Review Of Systems  Skin: negative  Eyes: negative  Ears/Nose/Throat: negative  Respiratory: No shortness of breath, dyspnea on exertion, cough, or hemoptysis    O: Physical Exam:  Wounds healing well.  Some erythema with fibrous tissue small lesion distal aspect most proximal wound.  Other wounds appear supple and well epithelialized.  CMS intact to LLE.  Hip supple to motion.  Adequate flex/ext/abduction LLE against gravity, active IR/ER as well.    Lab:Triglyc 182, Alk phos 164, Hgb 9.5    Images:Evidence healing well L subtroch femur fx and adequate fixation   CT Chest/Abdomen/Pelvis w Contrast Final result 7/3/2024 3:10 PM          Narrative   Combined Report of: PET and CT on 7/3/2024 3:10 PM:    1.  PET of the neck, chest, abdomen, and pelvis.  2. PET CT Fusion for Attenuation Correction and Anatomical  Localization.  3. Diagnostic CT of the chest, abdomen and pelvis with intravenous  contrast obtained for diagnostic interpretation.  4. 3D MIP and PET-CT fused images were processed on an independent  workstation and archived to PACS and reviewed by a radiologist.   ...   Impression   IMPRESSION:    1. High PSMA receptor expression in several metastatic lesions in the  axial and proximal appendicular skeleton (~20), retroperitoneal and  pelvic metastatic adenopathy. Mildly avid subcarinal node is also  likely metastatic.    2. Heterogenous PSMA uptake in the prostate gland without discrete  focality.        IMPRESSION:     1. High PSMA receptor expression in several metastatic lesions in the  axial and proximal appendicular skeleton (~20), retroperitoneal and  pelvic metastatic adenopathy. Mildly avid subcarinal node is also  likely metastatic.     2. Heterogenous PSMA uptake in the prostate gland without discrete  focality.     3. Cirrhotic liver morphology with features of portal hypertension  including splenomegaly and dilated upper abdominal portosystemic  collaterals.     4. Incidental bilateral thyroid nodules can be characterization with  thyroid ultrasound.         A:  Metastatic adenocarcinoma being treated by rad onc/hem onc, L hip fx/subtroch pathologic healing well after ORIF and cephalomedullary fixation      P:  Okay to WBAT, continue rehab  See back with new images 6 weeks either this clinic or N with Leanne Laguna/Shawn/Linda as he may be moving to [place]  Notify if exacerbation symptoms               In addition to the above assessment and plan each active problem on Gucci's problem list was evaluated today. This included the questioning of Gucci for any medication problems. We will continue the current treatment plan for these active problems except as noted.        Again, thank you  for allowing me to participate in the care of your patient.        Sincerely,        Rafa Wagner MD

## 2024-07-10 ENCOUNTER — NURSE TRIAGE (OUTPATIENT)
Dept: ONCOLOGY | Facility: CLINIC | Age: 74
End: 2024-07-10
Payer: COMMERCIAL

## 2024-07-10 NOTE — CONFIDENTIAL NOTE
Patient called to get advice regarding his physical therapy due to recent hip fracture and bones mets.  Discussed with Dr. Plunkett who states it's ok to do PT as tolerated.  Called patient back with information.

## 2024-07-12 ENCOUNTER — TELEPHONE (OUTPATIENT)
Dept: ORTHOPEDICS | Facility: CLINIC | Age: 74
End: 2024-07-12
Payer: COMMERCIAL

## 2024-07-12 ENCOUNTER — TELEPHONE (OUTPATIENT)
Dept: FAMILY MEDICINE | Facility: CLINIC | Age: 74
End: 2024-07-12
Payer: COMMERCIAL

## 2024-07-12 DIAGNOSIS — S72.142D CLOSED INTERTROCHANTERIC FRACTURE OF LEFT FEMUR WITH ROUTINE HEALING, SUBSEQUENT ENCOUNTER: Primary | ICD-10-CM

## 2024-07-12 RX ORDER — ASPIRIN 81 MG/1
81 TABLET ORAL DAILY
Qty: 90 TABLET | Refills: 3 | Status: SHIPPED | OUTPATIENT
Start: 2024-07-12

## 2024-07-12 NOTE — TELEPHONE ENCOUNTER
General Call    Contacts       Contact Date/Time Type Contact Phone/Fax    07/12/2024 10:21 AM CDT Phone (Incoming) Canelo Logan (Self) [phone] (Confidential)          Reason for Call:  possible bone break    What are your questions or concerns:  Please review the PET scan and orthopedic notes. Patient has been cleared for walking since Monday, but patient feels there might be another break that might be affected by the cancer. He is wondering if orders can be placed for an x-ray to confirm the break. Patient is concerned that the nursing home will discharge him before he is actually ready.     Date of last appointment with provider: 6/11/2024    Okay to leave a detailed message?: Yes at Home number on file [phone] (home)

## 2024-07-12 NOTE — TELEPHONE ENCOUNTER
"Per verbal order from Dr. Wagner, \"Take Aspirin 81mg once daily x1 year post surgical date, not eliquis once daily.\"     New order for Aspirin sent to be signed and then faxed to [facility] at [phone]    Caty Alfred RN   [facility]    "

## 2024-07-12 NOTE — TELEPHONE ENCOUNTER
Called patient. Notified him that PET scan was ordered by Dr. Plunkett, he should contact their office for results of PET scan (Dr. Plunkett's office: [phone]).  Notified pt that he would need to be seen and assessed first by a physician before any orders can be placed for X-ray. Pt is currently in nursing home. Advised him to speak to nursing home staff about this, and that we don't have any appointment openings today, so UC or ER would be advised, or he could try contacting orthopedic doctor that he just saw 7/8 to see if they are willing to place order.  Pt asking about a blood thinner. Notified him that writer is not able to see any blood thinning medications active on his med list, asked him for the name of the medication he is referring to. He did not know.  Pt would like his message sent to Dr. Jaramillo. States that the lazo is getting passed around and he would like Dr. Jaramillo to review. Notified him that message will be sent, but his PCP is out of office through 7/16.    Margarette Crowell RN

## 2024-07-12 NOTE — TELEPHONE ENCOUNTER
"Attempted to return call however went straight to voicemail. Unable to LM since VM box was full.    Patient is s/p left hip ORIF 5/20/24 with Dr. Wagner.     Looks like Gonzales WOMACK signed the eliquis while patient was in the hospital on 5/22/24. Per Gonzales's note 5/22/24, \"DVT Prophylaxis-Eliquis 2.5mg BID x 6 weeks, then every day x 1 year.\" There is a signed order so patient should be taking this.    Arina Kiser RN on 7/12/2024 at 11:33 AM    " ctxs q3 min

## 2024-07-12 NOTE — TELEPHONE ENCOUNTER
Other: Patient calling to speak to care team regarding blood thinner medication, facility says that the blood thinner was a recommendation not order so they stopped administering it. Would like to know if this is ok concerns for blood clot also has other questions     Could we send this information to you in [employer] or would you prefer to receive a phone call?:   Patient would prefer a phone call   Okay to leave a detailed message?: Yes at Cell number on file:    Telephone Information:   Mobile [phone]

## 2024-07-13 ENCOUNTER — TRANSFERRED RECORDS (OUTPATIENT)
Dept: HEALTH INFORMATION MANAGEMENT | Facility: CLINIC | Age: 74
End: 2024-07-13
Payer: COMMERCIAL

## 2024-07-15 ENCOUNTER — MEDICAL CORRESPONDENCE (OUTPATIENT)
Dept: HEALTH INFORMATION MANAGEMENT | Facility: CLINIC | Age: 74
End: 2024-07-15
Payer: COMMERCIAL

## 2024-07-17 ENCOUNTER — TELEPHONE (OUTPATIENT)
Dept: FAMILY MEDICINE | Facility: CLINIC | Age: 74
End: 2024-07-17
Payer: COMMERCIAL

## 2024-07-17 ENCOUNTER — PATIENT OUTREACH (OUTPATIENT)
Dept: ONCOLOGY | Facility: CLINIC | Age: 74
End: 2024-07-17
Payer: COMMERCIAL

## 2024-07-17 NOTE — TELEPHONE ENCOUNTER
Attempted to reach patient, unable to leave voicemail as voicemail box is full. Please try again.     PETER BrownN RN  [facility]

## 2024-07-17 NOTE — PROGRESS NOTES
[facility]: Cancer Care                                                                                          Chart review prior to appt with Dr. Plunkett on 7/18/24.    Piedad Rivas RN  Cancer Care Coordinator  [facility]

## 2024-07-17 NOTE — TELEPHONE ENCOUNTER
Yes patient has been problematic in past.      We are not able to send in meds to a nursing home or rehab type facility.    Richi Jaramillo MD
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
## 2024-07-17 NOTE — TELEPHONE ENCOUNTER
"Pt calling from the TCU - [facility] in [place] - [phone]    He is going to [place] to his Condo after he gets discharged- this is temporary home    Pt is wanting to continue to use tizanidine as TCU stopped using this medication while he was there because of \"costs and medicare\" per pt.    Patient wants his PCP to re prescribe this medication Tizanidine- muscle relaxer while he is in the TCU and he wants to be able to use this tonight    Pt states he think he might have a stye or other infection in his left eye.  He has used a heat compress for the last 5 days. Said he has gunk on his lashes. He would like Dr Jaramillo to send an antibiotic for eye infection.    Typically they have a provider on site or can be called for issues and isn't typical for a PCP to be sending medications to a TCU.    Pt became upset when RN asked to speak with a nurse to see if a doctor was there at the TCU because if the patient is having medical issues and is not being cared for that is an issue.  He does not want this nurse to call facility, explained we would still need to speak with someone there, cannot just send medications to a TCU and that if Dr Jaramillo looks at message would need a fax number that the patient could not provider.    Pt is upset as he states this nurse is not listening and just creating more problems and he is only there for 5 more days pt wants to speak with another nurse, but this is going to be the same answer regardless, let him know would send a message to his provider as Pt does not want to discuss further with this nurse and not receptive any longer      CASANDRA Gordon    Triage Nurse  [facility]        "

## 2024-07-18 ENCOUNTER — OFFICE VISIT (OUTPATIENT)
Dept: RADIATION ONCOLOGY | Facility: CLINIC | Age: 74
End: 2024-07-18
Attending: RADIOLOGY
Payer: COMMERCIAL

## 2024-07-18 ENCOUNTER — ONCOLOGY VISIT (OUTPATIENT)
Dept: ONCOLOGY | Facility: CLINIC | Age: 74
End: 2024-07-18
Payer: COMMERCIAL

## 2024-07-18 VITALS
WEIGHT: 231.04 LBS | DIASTOLIC BLOOD PRESSURE: 72 MMHG | BODY MASS INDEX: 32.35 KG/M2 | SYSTOLIC BLOOD PRESSURE: 165 MMHG | OXYGEN SATURATION: 98 % | HEIGHT: 71 IN | RESPIRATION RATE: 16 BRPM | TEMPERATURE: 98.4 F | HEART RATE: 65 BPM

## 2024-07-18 DIAGNOSIS — C77.8 MALIGNANT NEOPLASM METASTATIC TO LYMPH NODES OF MULTIPLE SITES (H): ICD-10-CM

## 2024-07-18 DIAGNOSIS — H10.32 ACUTE CONJUNCTIVITIS OF LEFT EYE, UNSPECIFIED ACUTE CONJUNCTIVITIS TYPE: ICD-10-CM

## 2024-07-18 DIAGNOSIS — Z51.11 ENCOUNTER FOR ANTINEOPLASTIC CHEMOTHERAPY: ICD-10-CM

## 2024-07-18 DIAGNOSIS — C79.51 METASTASIS TO BONE (H): ICD-10-CM

## 2024-07-18 DIAGNOSIS — C79.51 MALIGNANT NEOPLASM METASTATIC TO BONE (H): Primary | ICD-10-CM

## 2024-07-18 DIAGNOSIS — C61 PROSTATE CANCER (H): Primary | ICD-10-CM

## 2024-07-18 PROCEDURE — G0463 HOSPITAL OUTPT CLINIC VISIT: HCPCS | Mod: 25 | Performed by: INTERNAL MEDICINE

## 2024-07-18 PROCEDURE — 77334 RADIATION TREATMENT AID(S): CPT | Mod: 26 | Performed by: RADIOLOGY

## 2024-07-18 PROCEDURE — 77290 THER RAD SIMULAJ FIELD CPLX: CPT | Mod: 26 | Performed by: RADIOLOGY

## 2024-07-18 PROCEDURE — 77290 THER RAD SIMULAJ FIELD CPLX: CPT | Performed by: RADIOLOGY

## 2024-07-18 PROCEDURE — 77334 RADIATION TREATMENT AID(S): CPT | Performed by: RADIOLOGY

## 2024-07-18 PROCEDURE — 99215 OFFICE O/P EST HI 40 MIN: CPT | Performed by: INTERNAL MEDICINE

## 2024-07-18 RX ORDER — HEPARIN SODIUM,PORCINE 10 UNIT/ML
5-20 VIAL (ML) INTRAVENOUS DAILY PRN
Status: CANCELLED | OUTPATIENT
Start: 2024-08-14

## 2024-07-18 RX ORDER — EPINEPHRINE 1 MG/ML
0.3 INJECTION, SOLUTION INTRAMUSCULAR; SUBCUTANEOUS EVERY 5 MIN PRN
Status: CANCELLED | OUTPATIENT
Start: 2024-08-14

## 2024-07-18 RX ORDER — HEPARIN SODIUM (PORCINE) LOCK FLUSH IV SOLN 100 UNIT/ML 100 UNIT/ML
5 SOLUTION INTRAVENOUS
Status: CANCELLED | OUTPATIENT
Start: 2024-08-14

## 2024-07-18 RX ORDER — ALBUTEROL SULFATE 0.83 MG/ML
2.5 SOLUTION RESPIRATORY (INHALATION)
Status: CANCELLED | OUTPATIENT
Start: 2024-08-14

## 2024-07-18 RX ORDER — LIDOCAINE/PRILOCAINE 2.5 %-2.5%
CREAM (GRAM) TOPICAL
Qty: 30 G | Refills: 3 | Status: SHIPPED | OUTPATIENT
Start: 2024-07-18 | End: 2024-08-21

## 2024-07-18 RX ORDER — NEOMYCIN POLYMYXIN B SULFATES AND DEXAMETHASONE 3.5; 10000; 1 MG/ML; [USP'U]/ML; MG/ML
SUSPENSION/ DROPS OPHTHALMIC
Qty: 10 ML | Refills: 0 | Status: SHIPPED | OUTPATIENT
Start: 2024-07-18 | End: 2024-08-21

## 2024-07-18 RX ORDER — IBUPROFEN 200 MG
1 CAPSULE ORAL DAILY
Qty: 120 TABLET | Refills: 3 | Status: SHIPPED | OUTPATIENT
Start: 2024-07-18

## 2024-07-18 RX ORDER — ONDANSETRON 8 MG/1
8 TABLET, FILM COATED ORAL EVERY 8 HOURS PRN
Qty: 60 TABLET | Refills: 5 | Status: SHIPPED | OUTPATIENT
Start: 2024-07-18

## 2024-07-18 RX ORDER — DIPHENHYDRAMINE HYDROCHLORIDE 50 MG/ML
50 INJECTION INTRAMUSCULAR; INTRAVENOUS
Status: CANCELLED
Start: 2024-08-14

## 2024-07-18 RX ORDER — MEPERIDINE HYDROCHLORIDE 25 MG/ML
25 INJECTION INTRAMUSCULAR; INTRAVENOUS; SUBCUTANEOUS EVERY 30 MIN PRN
Status: CANCELLED | OUTPATIENT
Start: 2024-08-14

## 2024-07-18 RX ORDER — PREDNISONE 5 MG/1
5 TABLET ORAL
Qty: 60 TABLET | Refills: 1 | Status: SHIPPED | OUTPATIENT
Start: 2024-07-18

## 2024-07-18 RX ORDER — ALBUTEROL SULFATE 90 UG/1
1-2 AEROSOL, METERED RESPIRATORY (INHALATION)
Status: CANCELLED
Start: 2024-08-14

## 2024-07-18 RX ORDER — LORAZEPAM 2 MG/ML
0.5 INJECTION INTRAMUSCULAR EVERY 4 HOURS PRN
Status: CANCELLED | OUTPATIENT
Start: 2024-08-14

## 2024-07-18 RX ORDER — METHYLPREDNISOLONE SODIUM SUCCINATE 125 MG/2ML
125 INJECTION, POWDER, LYOPHILIZED, FOR SOLUTION INTRAMUSCULAR; INTRAVENOUS
Status: CANCELLED
Start: 2024-08-14

## 2024-07-18 RX ORDER — CHOLECALCIFEROL (VITAMIN D3) 50 MCG
1 TABLET ORAL DAILY
Qty: 120 TABLET | Refills: 3 | Status: SHIPPED | OUTPATIENT
Start: 2024-07-18

## 2024-07-18 ASSESSMENT — PAIN SCALES - GENERAL: PAINLEVEL: NO PAIN (0)
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
## 2024-07-18 NOTE — TELEPHONE ENCOUNTER
"Reached out to patient to notify of provider's message as written.     He is frustrated with this; states that he would only be \"re-issuing a prescribed medication that was already prescribed in the past\". He goes on to state that he is on the way to an appointment right now and will get these medications from the doctor he is seeing.     Patient states that he is being discharged on Sunday, 7/21/24. He has a telephone visit 7/25/24 with PCP and will address whatever is needed at that point.    PETER QuijanoN RN  [facility], [place]  Primary Care  "

## 2024-07-18 NOTE — LETTER
7/18/2024      Gucci Logan  [address] [facility] [address]      Dear Colleague,    Thank you for referring your patient, Gucci Logan, to the [facility] RADIATION ONCOLOGY. Please see a copy of my visit note below.    Simulation Note    Date: 7/18/2024     Patient: Gucci Logan    Diagnosis: Malignant neoplasm metastatic to bone (H)    Type of Simulation:  Palliation     Patient Position: Supine    Patient Immobilization: Custom:  Vac-Loc    Simulation Aid(s): None    Image Acquisition: Conventional CT simulation without 4D    Total Dose Planned: 2500 cGy      Dose/fraction:500 cGy    Energy of machine:  10 MV           Type of Radiotherapy Technique: AP/PA with customized MLC blocking      Continuing Physcis/Dosimetry  and Dose calculations are ordered.  Simple simulations will be done prior to new start and changes in fields.  Weekly on treat visit.      BETTINA Gan M.D.  Department of Radiation Oncology  [facility]       Again, thank you for allowing me to participate in the care of your patient.        Sincerely,        Subha Gan MD

## 2024-07-18 NOTE — PROGRESS NOTES
Simulation Note    Date: 7/18/2024     Patient: Gucci Logan    Diagnosis: Malignant neoplasm metastatic to bone (H)    Type of Simulation:  Palliation     Patient Position: Supine    Patient Immobilization: Custom:  Vac-Loc    Simulation Aid(s): None    Image Acquisition: Conventional CT simulation without 4D    Total Dose Planned: 2500 cGy      Dose/fraction:500 cGy    Energy of machine:  10 MV           Type of Radiotherapy Technique: AP/PA with customized MLC blocking      Continuing Physcis/Dosimetry  and Dose calculations are ordered.  Simple simulations will be done prior to new start and changes in fields.  Weekly on treat visit.      BETTINA Gan M.D.  Department of Radiation Oncology  [facility]

## 2024-07-18 NOTE — NURSING NOTE
"Oncology Rooming Note    July 18, 2024 11:29 AM   Gucci Logan is a 74 year old male who presents for:    Chief Complaint   Patient presents with    Oncology Clinic Visit     Prostate cancer     Initial Vitals: BP (!) 165/72 (BP Location: Right arm, Patient Position: Sitting, Cuff Size: Adult Regular)   Pulse 65   Temp 98.4  F (36.9  C) (Oral)   Resp 20   SpO2 98%  Estimated body mass index is 32.22 kg/m  as calculated from the following:    Height as of 6/26/24: 1.803 m (5' 11\").    Weight as of 7/8/24: 104.8 kg (231 lb). There is no height or weight on file to calculate BSA.  No Pain (0) Comment: Data Unavailable   No LMP for male patient.  Allergies reviewed: Yes  Medications reviewed: Yes    Medications: MEDICATION REFILLS NEEDED TODAY. Provider was notified.  Pharmacy name entered into EPIC:    [id number] - [place], [place] - [address] [facility]  [facility] - [place], [place] - [address] [place] [address] PHARM[address] [place] [address] - [address] [facility]    Frailty Screening:   Is the patient here for a new oncology consult visit in cancer care? 2. No      Clinical concerns:       Corinna Tobias CMA              "

## 2024-07-18 NOTE — TELEPHONE ENCOUNTER
Writer spoke with patient and has resolved the issue; follow-up is scheduled next Thursday, 7/25.    ABI Quijano RN  [facility]

## 2024-07-19 ENCOUNTER — TELEPHONE (OUTPATIENT)
Dept: ONCOLOGY | Facility: CLINIC | Age: 74
End: 2024-07-19
Payer: COMMERCIAL

## 2024-07-19 ENCOUNTER — PATIENT OUTREACH (OUTPATIENT)
Dept: ONCOLOGY | Facility: CLINIC | Age: 74
End: 2024-07-19
Payer: COMMERCIAL

## 2024-07-19 DIAGNOSIS — C61 PROSTATE CANCER (H): Primary | ICD-10-CM

## 2024-07-19 NOTE — TELEPHONE ENCOUNTER
FCO APPROVED    Medication: NUBEQA 300 MG PO TABS  Amount: $ 3,250  Foundation Name: PAN  Foundation Phone:    Foundation Effective Date: 4/19/2024  Foundation Expiration Date: 7/17/2025  Additional Information:   Patient Notified: yes        Thank you,    Ade Fowler  Oncology Pharmacy Liaison II  [email or web address]  Phone: [phone]  Fax: [phone]

## 2024-07-19 NOTE — TELEPHONE ENCOUNTER
Prior Authorization Approval    Medication: NUBEQA 300 MG PO TABS  Authorization Effective Date: 7/19/2024  Authorization Expiration Date: 7/19/2025  Approved Dose/Quantity: 84 per 21 DS  Reference #: [id number]   Insurance Company: [employer] - Phone [phone] Fax [phone]  Expected CoPay: $ 2,786.89  CoPay Card Available:      Financial Assistance Needed: yes  Which Pharmacy is filling the prescription: [facility] - [place] [address]   Pharmacy Notified: yes  Patient Notified: yes        Thank you,    Ade Fowler  Oncology Pharmacy Liaison II  [email or web address]  Phone: [phone]  Fax: [phone]

## 2024-07-19 NOTE — PROGRESS NOTES
[facility]: Cancer Care                                                                                          Patient called with questions regarding upcoming appts- answered questions.  Patient also reported he has forms for [employer].  Informed patient this writer will have a  contact him to complete the forms.  Patient verbalized understanding.    Piedad Rivas RN  Cancer Care Coordinator  [facility]

## 2024-07-19 NOTE — TELEPHONE ENCOUNTER
PA Initiation    Medication: NUBEQA 300 MG PO TABS  Insurance Company: [employer] - Phone [phone] Fax [phone]  Pharmacy Filling the Rx:    Filling Pharmacy Phone:    Filling Pharmacy Fax:    Start Date: 7/19/2024        Thank you,    Ade Fowler  Oncology Pharmacy Liaison II  [email or web address]  Phone: [phone]  Fax: [phone]

## 2024-07-22 ENCOUNTER — PATIENT OUTREACH (OUTPATIENT)
Dept: CARE COORDINATION | Facility: CLINIC | Age: 74
End: 2024-07-22
Payer: COMMERCIAL

## 2024-07-22 ENCOUNTER — MEDICAL CORRESPONDENCE (OUTPATIENT)
Dept: HEALTH INFORMATION MANAGEMENT | Facility: CLINIC | Age: 74
End: 2024-07-22

## 2024-07-22 NOTE — PROGRESS NOTES
Social Work - Telephone/Cameohar[employer]ELUP message  [facility]  Data:   Patient Name: Gucci Logan  Goes By: Canelo SADLER/Age: 1950 (74 year old)      Referral Source:  [facility]  Reason for Referral: [employer]    Intervention: Unable to leave message due to mailbox being full .   Plan:  will attempt to reach patient at a later time.     IVANIA Ferrell,LGSW  Hematology/Oncology Social Worker  Phone:[phone] Pager: [phone]

## 2024-07-23 ENCOUNTER — TELEPHONE (OUTPATIENT)
Dept: FAMILY MEDICINE | Facility: CLINIC | Age: 74
End: 2024-07-23
Payer: COMMERCIAL

## 2024-07-23 NOTE — TELEPHONE ENCOUNTER
"Patient calling, he was discharged from [facility] this past Sunday, he was there for 3 months.    He is back at home in [place].    He is calling due to concern about whether his pain meds were included in the discharge orders for the pharmacy so he wants to give Dr. Jaramillo a \"heads up\" that he might be getting a call for that.    A little confusing, patient is very worried about getting his pain meds as he is home and now has to do his own care so has more pain.  Apparently has cancer, the left hip is the most concerning/painful site currently.    He states he has a printed prescription that he will be hand delivering to "[employer] today but states \"I'm not a doctor and don't know how to read this\" so he is not sure if his pain meds are included in the Rx.    He is scheduled for a phone call visit with Dr. Jaramillo on Thursday but will need pain meds filled for a day or 2 to get him by.   IF the pain med Rx is not included when he gets to "[employer] he says the pharmacy will reach out to us for it.   He says he is calling just to give us a \"heads up\" on the issue in case "[employer] calls.    Routed to Dr. Jaramillo per patient's request.  Connie MCARTHUR RN  [facility] Triage            "

## 2024-07-25 ENCOUNTER — VIRTUAL VISIT (OUTPATIENT)
Dept: FAMILY MEDICINE | Facility: CLINIC | Age: 74
End: 2024-07-25
Payer: COMMERCIAL

## 2024-07-25 ENCOUNTER — TELEPHONE (OUTPATIENT)
Dept: FAMILY MEDICINE | Facility: CLINIC | Age: 74
End: 2024-07-25

## 2024-07-25 DIAGNOSIS — C61 PROSTATE CANCER METASTATIC TO BONE (H): Primary | ICD-10-CM

## 2024-07-25 DIAGNOSIS — C79.51 PROSTATE CANCER METASTATIC TO BONE (H): Primary | ICD-10-CM

## 2024-07-25 DIAGNOSIS — I10 HYPERTENSION GOAL BP (BLOOD PRESSURE) < 140/90: ICD-10-CM

## 2024-07-25 DIAGNOSIS — Z87.81 S/P LEFT HIP FRACTURE: ICD-10-CM

## 2024-07-25 PROCEDURE — 99443 PR PHYSICIAN TELEPHONE EVALUATION 21-30 MIN: CPT | Mod: 93 | Performed by: FAMILY MEDICINE

## 2024-07-25 RX ORDER — OXYCODONE HYDROCHLORIDE 5 MG/1
5-10 TABLET ORAL EVERY 4 HOURS PRN
Qty: 80 TABLET | Refills: 0 | Status: SHIPPED | OUTPATIENT
Start: 2024-07-25 | End: 2024-08-02

## 2024-07-25 NOTE — TELEPHONE ENCOUNTER
Home Care is calling regarding an established patient with [facility].       Requesting orders from: Richi Jaramillo  Provider is following patient: Yes  Is this a 60-day recertification request?  No    Orders Requested    Social Work  Request for initial evaluation and treatment (one time)       Verbal orders given.  Home Care will send orders for provider to sign.  Confirmed ok to leave a detailed message with call back.  Contact information confirmed and updated as needed.    Randi Tello RN

## 2024-07-25 NOTE — PROGRESS NOTES
Canelo is a 74 year old who is being evaluated via a billable telephone visit.    What phone number would you like to be contacted at? [phone]  How would you like to obtain your AVS? Mail a copy  Originating Location (pt. Location): Home    Distant Location (provider location):  On-site        Subjective   Canelo is a 74 year old, presenting for the following health issues:  Patient Request (Medication/Physical condition/Radiation and chemo)        7/25/2024     9:20 AM   Additional Questions   Roomed by Sofia Conde     HPI       Was in rehab for 3 months after hip fracture    Lost 3 months     Has the prostate cancer to bone    Has cancer doctors involved    Plan is start radiation and infusion next Monday    Hurts to move    Able to stand, no walking    Trying to minimize pressure on left leg    Back at home now by self    Just had hospital bed developed    Wheelchair bound    Was on oxycodone two 5 mg oxycodone 3x daily and also at night    Had home care assessment    Radiation will be to help the hip    Then will also get systemic chemo    Needs oxycodone    Avg systolic is 145        Objective           Vitals:  No vitals were obtained today due to virtual visit.    Physical Exam   General: Alert and no distress //Respiratory: No audible wheeze, cough, or shortness of breath // Psychiatric:  Appropriate affect, tone, and pace of words      ASSESSMENT / PLAN:  (C61,  C79.51) Prostate cancer metastatic to bone (H)  (primary encounter diagnosis)  Comment: very unusual case in that all his psa readings over the years were fine but now with metastatic prostate ca.  Needing up to 10 pain pills daily.   Severe pain.  Gave prescription for 80 pills.  Do another phone visit in 10 days.   Plan: oxyCODONE (ROXICODONE) 5 MG tablet         Cancer treatment per specialists     (I10) Hypertension goal BP (blood pressure) < 140/90  Comment: blood pressure a little high but don't want to push it too low and contribute to  another fall.   Plan: continue same meds for now. Don't restart spironolactone.     (Z87.81) S/p left hip fracture  Comment: now home, out of rehab.   Plan: as above.  Sounds like he is getting home care eval.       I reviewed the patient's medications, allergies, medical history, family history, and social history.    Richi Jaramillo MD                  Phone call duration: 23 minutes ( 9:43 to 10:06 am )  Signed Electronically by: Richi Jaramillo MD

## 2024-07-29 ENCOUNTER — ONCOLOGY VISIT (OUTPATIENT)
Dept: RADIATION ONCOLOGY | Facility: CLINIC | Age: 74
End: 2024-07-29

## 2024-07-29 ENCOUNTER — PATIENT OUTREACH (OUTPATIENT)
Dept: ONCOLOGY | Facility: CLINIC | Age: 74
End: 2024-07-29
Payer: COMMERCIAL

## 2024-07-29 ENCOUNTER — APPOINTMENT (OUTPATIENT)
Dept: RADIATION ONCOLOGY | Facility: CLINIC | Age: 74
End: 2024-07-29
Attending: RADIOLOGY
Payer: COMMERCIAL

## 2024-07-29 ENCOUNTER — INFUSION THERAPY VISIT (OUTPATIENT)
Dept: ONCOLOGY | Facility: CLINIC | Age: 74
End: 2024-07-29
Attending: INTERNAL MEDICINE
Payer: COMMERCIAL

## 2024-07-29 VITALS
TEMPERATURE: 98.7 F | SYSTOLIC BLOOD PRESSURE: 178 MMHG | OXYGEN SATURATION: 94 % | DIASTOLIC BLOOD PRESSURE: 79 MMHG | HEART RATE: 65 BPM

## 2024-07-29 DIAGNOSIS — C61 PROSTATE CANCER (H): ICD-10-CM

## 2024-07-29 DIAGNOSIS — C79.51 METASTASIS TO BONE (H): Primary | ICD-10-CM

## 2024-07-29 DIAGNOSIS — Z51.11 ENCOUNTER FOR ANTINEOPLASTIC CHEMOTHERAPY: Primary | ICD-10-CM

## 2024-07-29 PROCEDURE — 77412 RADIATION TX DELIVERY LVL 3: CPT | Performed by: RADIOLOGY

## 2024-07-29 PROCEDURE — 250N000011 HC RX IP 250 OP 636: Performed by: INTERNAL MEDICINE

## 2024-07-29 PROCEDURE — 77280 THER RAD SIMULAJ FIELD SMPL: CPT | Performed by: RADIOLOGY

## 2024-07-29 PROCEDURE — 96402 CHEMO HORMON ANTINEOPL SQ/IM: CPT

## 2024-07-29 PROCEDURE — 77280 THER RAD SIMULAJ FIELD SMPL: CPT | Mod: 26 | Performed by: RADIOLOGY

## 2024-07-29 RX ADMIN — DEGARELIX 240 MG: KIT at 16:21

## 2024-07-29 NOTE — PROGRESS NOTES
[facility]: Cancer Care                                                                                          Patient called back and needed to confirm injection appt time & location.    Piedad Rivas RN  Cancer Care Coordinator  [facility]

## 2024-07-29 NOTE — PROGRESS NOTES
"       Lakhwinder Lemos is a 72 year old, presenting for the following health issues:  Shortness of Breath        5/18/2023    10:34 AM   Additional Questions   Roomed by Sofia CUELLAR     Shortness of breath         Review of Systems        Since home, breathing not as good    No set pattern    Has to stop sometimes to catch breath    [facility]    No chest pain    Sore all over     Getting egd soon          Objective    /70 (BP Location: Left arm, Patient Position: Chair, Cuff Size: Adult Regular)   Pulse 77   Temp 98  F (36.7  C) (Temporal)   Resp 18   Ht 1.803 m (5' 10.98\")   Wt 118 kg (260 lb 2 oz)   SpO2 96%   BMI 36.30 kg/m    Body mass index is 36.3 kg/m .  Physical Exam  Constitutional:       Appearance: He is well-developed.   HENT:      Head: Normocephalic and atraumatic.   Eyes:      Conjunctiva/sclera: Conjunctivae normal.   Neck:      Vascular: No carotid bruit.   Cardiovascular:      Rate and Rhythm: Normal rate and regular rhythm.      Heart sounds: Normal heart sounds.   Pulmonary:      Effort: Pulmonary effort is normal. No respiratory distress.      Breath sounds: Normal breath sounds.   Neurological:      Mental Status: He is alert and oriented to person, place, and time.      Cranial Nerves: No cranial nerve deficit.   Psychiatric:         Speech: Speech normal.         Behavior: Behavior normal.         no pitting edema    Radials symmetric     ASSESSMENT / PLAN:  (R06.09) Dyspnea on exertion  (primary encounter diagnosis)  Comment: patient will schedule this. Need to make sure heart okay.   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  Gave him this and discussed in detail.       I reviewed the patient's medications, allergies, medical history, family history, and social history.    Richi Jaramillo MD                          attack. These may include:    Chest pain or pressure, or a strange feeling in the chest.     Sweating.     Shortness of breath.     Pain, pressure, or a strange feeling in the back, neck, jaw, or upper belly or in one or both shoulders or arms.     Lightheadedness or sudden weakness.     A fast or irregular heartbeat.   After you call 911, the  may tell you to chew 1 adult-strength or 2 to 4 low-dose aspirin. Wait for an ambulance. Do not try to drive yourself.  Watch closely for changes in your health, and be sure to contact your doctor if you have any problems.  Where can you learn more?  Go to https://www.[employer].net/patientEd and enter F075 to learn more about \"A Healthy Heart: Care Instructions.\"  Current as of: June 24, 2023  Content Version: 14.1  © 1011-7946 [employer].   Care instructions adapted under license by [employer]. If you have questions about a medical condition or this instruction, always ask your healthcare professional. [employer] disclaims any warranty or liability for your use of this information.      Personalized Preventive Plan for Lana Reyes - 7/29/2024  Medicare offers a range of preventive health benefits. Some of the tests and screenings are paid in full while other may be subject to a deductible, co-insurance, and/or copay.    Some of these benefits include a comprehensive review of your medical history including lifestyle, illnesses that may run in your family, and various assessments and screenings as appropriate.    After reviewing your medical record and screening and assessments performed today your provider may have ordered immunizations, labs, imaging, and/or referrals for you.  A list of these orders (if applicable) as well as your Preventive Care list are included within your After Visit Summary for your review.    Other Preventive Recommendations:    A preventive eye exam performed by an eye specialist is recommended every 1-2

## 2024-07-29 NOTE — PATIENT INSTRUCTIONS
[facility] Triage and after hours / weekends / holidays:  [phone]    Please call the triage or after hours line if you experience a temperature greater than or equal to 100.4, shaking chills, have uncontrolled nausea, vomiting and/or diarrhea, dizziness, shortness of breath, chest pain, bleeding, unexplained bruising, or if you have any other new/concerning symptoms, questions or concerns.      If you are having any concerning symptoms or wish to speak to a provider before your next infusion visit, please call triage to notify them so we can adequately serve you.     If you need a refill on a narcotic prescription or other medication, please call before your infusion appointment.

## 2024-07-29 NOTE — PROGRESS NOTES
[facility]: Cancer Care                                                                                          Received message from infusion that patient called and left a message.  Infusion requested RNCC to call back.  Reached ANTWAN GALDAMEZ to return call.  Notified infusion center.    Piedad Rivas RN  Cancer Care Coordinator  [facility]

## 2024-07-29 NOTE — LETTER
7/29/2024      Gucci Logan  [address] [facility] [address]      Dear Colleague,    Thank you for referring your patient, Gucci Logan, to the [facility] RADIATION ONCOLOGY. Please see a copy of my visit note below.    No notes on file    Again, thank you for allowing me to participate in the care of your patient.        Sincerely,        Subha Gan MD

## 2024-07-29 NOTE — PROGRESS NOTES
Infusion Nursing Note:  Gucci Logan presents today for Cycle 1 Day 1 Degarelix injection.    Patient seen by provider today: No   present during visit today: Not Applicable.    Note: Patient denies any signs or symptoms of infection. Patient offers no complaints or concerns. /79 upon arrival to infusion suite; asymptomatic. Patient reports recent changes to his blood pressure medication. He is following up with his PCP later this week. RN encouraged patient to follow-up with his PCP regarding this issue. Patients states BP at home is usally 130's-140's systolic and he is anxious about his injection today. Patient agreeable to treatment plan.    Intravenous Access:  No Intravenous access/labs at this visit.    Treatment Conditions:  Not Applicable.    Post Infusion Assessment:  Patient tolerated injection without incident into the bilateral abdominal tissue.     Discharge Plan:   Patient declined prescription refills.  Discharge instructions reviewed with: Patient.  Patient and/or family verbalized understanding of discharge instructions and all questions answered.  Copy of AVS reviewed with patient and/or family.  Patient will return 08/14/24 for next appointment.  Patient discharged in stable condition accompanied by: self.  Departure Mode: Wheelchair.      Marta Figueroa RN

## 2024-07-30 ENCOUNTER — APPOINTMENT (OUTPATIENT)
Dept: RADIATION ONCOLOGY | Facility: CLINIC | Age: 74
End: 2024-07-30
Attending: RADIOLOGY
Payer: COMMERCIAL

## 2024-07-30 PROCEDURE — 77412 RADIATION TX DELIVERY LVL 3: CPT | Performed by: RADIOLOGY

## 2024-07-31 ENCOUNTER — MEDICAL CORRESPONDENCE (OUTPATIENT)
Dept: HEALTH INFORMATION MANAGEMENT | Facility: CLINIC | Age: 74
End: 2024-07-31

## 2024-07-31 ENCOUNTER — APPOINTMENT (OUTPATIENT)
Dept: RADIATION ONCOLOGY | Facility: CLINIC | Age: 74
End: 2024-07-31
Attending: RADIOLOGY
Payer: COMMERCIAL

## 2024-07-31 ENCOUNTER — TELEPHONE (OUTPATIENT)
Dept: FAMILY MEDICINE | Facility: CLINIC | Age: 74
End: 2024-07-31

## 2024-07-31 DIAGNOSIS — Z53.9 DIAGNOSIS NOT YET DEFINED: Primary | ICD-10-CM

## 2024-07-31 PROCEDURE — 77412 RADIATION TX DELIVERY LVL 3: CPT | Performed by: RADIOLOGY

## 2024-07-31 PROCEDURE — 77427 RADIATION TX MANAGEMENT X5: CPT | Performed by: RADIOLOGY

## 2024-07-31 PROCEDURE — 77417 THER RADIOLOGY PORT IMAGE(S): CPT | Performed by: RADIOLOGY

## 2024-07-31 PROCEDURE — G0180 MD CERTIFICATION HHA PATIENT: HCPCS | Performed by: FAMILY MEDICINE

## 2024-07-31 NOTE — TELEPHONE ENCOUNTER
Forms/Letter Request    Type of form/letter: Home Health Certification      Do we have the form/letter: Yes:     Who is the form from? Home care    Where did/will the form come from? form was faxed in    When is form/letter needed by: 3-5 Days     How would you like the form/letter returned: Fax : [phone]    Patient Notified form requests are processed in 5-7 business days:Yes    Okay to leave a detailed message?: Yes at Home number on file [phone] (home)

## 2024-08-01 ENCOUNTER — OFFICE VISIT (OUTPATIENT)
Dept: RADIATION ONCOLOGY | Facility: CLINIC | Age: 74
End: 2024-08-01
Attending: RADIOLOGY
Payer: COMMERCIAL

## 2024-08-01 VITALS — RESPIRATION RATE: 16 BRPM | HEART RATE: 66 BPM | SYSTOLIC BLOOD PRESSURE: 168 MMHG | DIASTOLIC BLOOD PRESSURE: 74 MMHG

## 2024-08-01 DIAGNOSIS — C79.51 MALIGNANT NEOPLASM METASTATIC TO BONE (H): Primary | ICD-10-CM

## 2024-08-01 PROCEDURE — 77412 RADIATION TX DELIVERY LVL 3: CPT | Performed by: RADIOLOGY

## 2024-08-01 NOTE — LETTER
8/1/2024      Gucci Logan  [address] [facility] [address]      Dear Colleague,    Thank you for referring your patient, Gucci Logan, to the [facility] RADIATION ONCOLOGY. Please see a copy of my visit note below.    WEEKLY MANAGEMENT NOTE  Radiation Oncology  8/1/2024         Patient Name: Gucci Logan  MRN: [id number]     Left Pelvis Current Dose: 2000/2500 cGy Fractions: 4/5       DAILY DOSE:     500 cGy/ day,  5 times/week        DISEASE UNDER TREATMENT: Post XRT after ORIF for metastatic prostate cancer    SUBJECTIVE: 73 yo man receiving post op adjuvant radiotherapy to the left hip    CTC V5.0 Toxicity Criteria  Fatigue: Grade 1: Fatigue relieved by rest  Comment:        OBJECTIVE:  BP (!) 168/74   Pulse 66   Resp 16   Skin: Grade 0: No toxicity      IMPRESSION: The patient is tolerating the treatment.  The patient set up, dose, and portal imagings were reviewed.    PLAN: Complete radiotherapy with PRN follow up      BETTINA Gan M.D.  Department of Radiation Oncology  [facility]       Again, thank you for allowing me to participate in the care of your patient.        Sincerely,        Subha Gan MD

## 2024-08-01 NOTE — PROGRESS NOTES
WEEKLY MANAGEMENT NOTE  Radiation Oncology  8/1/2024         Patient Name: Gucci Logan  MRN: [id number]     Left Pelvis Current Dose: 2000/2500 cGy Fractions: 4/5       DAILY DOSE:     500 cGy/ day,  5 times/week        DISEASE UNDER TREATMENT: Post XRT after ORIF for metastatic prostate cancer    SUBJECTIVE: 75 yo man receiving post op adjuvant radiotherapy to the left hip    CTC V5.0 Toxicity Criteria  Fatigue: Grade 1: Fatigue relieved by rest  Comment:        OBJECTIVE:  BP (!) 168/74   Pulse 66   Resp 16   Skin: Grade 0: No toxicity      IMPRESSION: The patient is tolerating the treatment.  The patient set up, dose, and portal imagings were reviewed.    PLAN: Complete radiotherapy with PRN follow up      BETTINA Gan M.D.  Department of Radiation Oncology  [facility]

## 2024-08-02 ENCOUNTER — HOSPITAL ENCOUNTER (OUTPATIENT)
Facility: CLINIC | Age: 74
End: 2024-08-02
Admitting: INTERNAL MEDICINE
Payer: COMMERCIAL

## 2024-08-02 ENCOUNTER — VIRTUAL VISIT (OUTPATIENT)
Dept: FAMILY MEDICINE | Facility: CLINIC | Age: 74
End: 2024-08-02
Payer: COMMERCIAL

## 2024-08-02 ENCOUNTER — TELEPHONE (OUTPATIENT)
Dept: INTERVENTIONAL RADIOLOGY/VASCULAR | Facility: CLINIC | Age: 74
End: 2024-08-02

## 2024-08-02 ENCOUNTER — APPOINTMENT (OUTPATIENT)
Dept: RADIATION ONCOLOGY | Facility: CLINIC | Age: 74
End: 2024-08-02
Attending: RADIOLOGY
Payer: COMMERCIAL

## 2024-08-02 DIAGNOSIS — C61 PROSTATE CANCER METASTATIC TO BONE (H): ICD-10-CM

## 2024-08-02 DIAGNOSIS — R53.1 WEAKNESS: ICD-10-CM

## 2024-08-02 DIAGNOSIS — I10 HYPERTENSION GOAL BP (BLOOD PRESSURE) < 140/90: Primary | ICD-10-CM

## 2024-08-02 DIAGNOSIS — C79.51 PROSTATE CANCER METASTATIC TO BONE (H): ICD-10-CM

## 2024-08-02 DIAGNOSIS — Z87.81 S/P LEFT HIP FRACTURE: ICD-10-CM

## 2024-08-02 DIAGNOSIS — K60.30 ANAL FISTULA: ICD-10-CM

## 2024-08-02 PROCEDURE — 99443 PR PHYSICIAN TELEPHONE EVALUATION 21-30 MIN: CPT | Mod: 93 | Performed by: FAMILY MEDICINE

## 2024-08-02 PROCEDURE — 77336 RADIATION PHYSICS CONSULT: CPT | Performed by: RADIOLOGY

## 2024-08-02 PROCEDURE — 77412 RADIATION TX DELIVERY LVL 3: CPT | Performed by: RADIOLOGY

## 2024-08-02 RX ORDER — METOPROLOL SUCCINATE 100 MG/1
TABLET, EXTENDED RELEASE ORAL
COMMUNITY
Start: 2024-08-02 | End: 2024-08-15

## 2024-08-02 RX ORDER — OXYCODONE HYDROCHLORIDE 5 MG/1
5-10 TABLET ORAL EVERY 4 HOURS PRN
Qty: 70 TABLET | Refills: 0 | Status: SHIPPED | OUTPATIENT
Start: 2024-08-05 | End: 2024-08-15

## 2024-08-02 RX ORDER — METOPROLOL SUCCINATE 100 MG/1
TABLET, EXTENDED RELEASE ORAL
COMMUNITY
Start: 2024-08-02 | End: 2024-08-02

## 2024-08-02 NOTE — PATIENT INSTRUCTIONS
Decrease metoprolol to 100 mg daily    See the enclosed prescription for wheelchair; take to medical supply store/ facility of your choice     Do another phone visit August 15 or 16    Monitor anal fistula

## 2024-08-02 NOTE — TELEPHONE ENCOUNTER
Called Canelo to give him the following information: He would prefer a call on Wednesday morning at 9:30 next week    INTERVENTIONAL RADIOLOGY INSTRUCTIONS     You are scheduled for an upcoming procedure in the   Interventional Radiology Department at [facility].       Date: 8/12/24      Procedure: Port Placement     Address: [facility]                 [address], [place]  [address]     [facility] is located on [facility], across the street from hospital.  There is a skyway attached to this ramp that will direct you to the patient check in area.       Check into the [facility] Lounge at: 9:30 am    Do not eat any food after: 1:30 am  You may drink clear liquids until 7:30 am then nothing to drink until after your procedure.  Clear liquids are: water, apple juice, black coffee (no cream, sugar or milk), gatorade, jello       Two visitors may accompany you to your procedure.    You will need to have someone available to drive you home.    We recommend that you have a responsible adult with you for 24 hours after you get home.    Hold metformin the morning of your procedure. All other meds can be taken with clear liquids       If you have any questions, please call the IR nurses at [phone].     Thank you!      Interventional Radiology Intake Nurse Coordinator  [phone]

## 2024-08-02 NOTE — PROGRESS NOTES
Canelo is a 74 year old who is being evaluated via a billable telephone visit.    What phone number would you like to be contacted at? [phone]  How would you like to obtain your AVS? Mail a copy  Originating Location (pt. Location): Other      Distant Location (provider location):  On-site        Subjective   Canelo is a 74 year old, presenting for the following health issues:  RECHECK        7/25/2024     9:20 AM   Additional Questions   Roomed by Sofia Conde     History of Present Illness       Reason for visit:  Follow up    He eats 0-1 servings of fruits and vegetables daily.He consumes 0 sweetened beverage(s) daily.He exercises with enough effort to increase his heart rate 9 or less minutes per day.  He exercises with enough effort to increase his heart rate 3 or less days per week.   He is taking medications regularly.           Just finished the one week of radiation treatment on hip    Home nurse blood pressure reading 155 over 60    Pain still there    Having a port put in soon    28 or 30 pills left    Used about 50 over 8 days     Using about 7 per day    Surgery two months ago on hip    Patient using pain as guide to activity    Needs wheelchair          Objective           Vitals:  No vitals were obtained today due to virtual visit.    Physical Exam   General: Alert and no distress //Respiratory: No audible wheeze, cough, or shortness of breath // Psychiatric:  Appropriate affect, tone, and pace of words    ASSESSMENT / PLAN:  (I10) Hypertension goal BP (blood pressure) < 140/90  (primary encounter diagnosis)  Comment: decrease to just one pill daily so 100 mg instead of 150 mg   Plan: metoprolol succinate ER (TOPROL XL) 100 MG 24         hr tablet             (Z87.81) S/p left hip fracture  Comment: using about 7 daily.  Will need a new prescription in about 3 days. Did prescription but then do anoher phone visit on Aug 15 or 16.  Also did order for wheelchair.  Can send this to patient.  Also he  could check out getting one from [employer] etc   Plan: Wheelchair Order for DME - ONLY FOR DME,         oxyCODONE (ROXICODONE) 5 MG tablet             (C61,  C79.51) Prostate cancer metastatic to bone (H)  Comment: as above   Plan: Wheelchair Order for DME - ONLY FOR DME,         oxyCODONE (ROXICODONE) 5 MG tablet             (R53.1) Weakness  Comment: as above   Plan: Wheelchair Order for DME - ONLY FOR DME             (K60.3) Anal fistula  Comment: from his description sounds like anal fistula but not causing significant problems so okay to monitor  Plan: as above       I reviewed the patient's medications, allergies, medical history, family history, and social history.    Richi Jaramillo MD          Phone call duration: 26 minutes ( 2:09 to 2:35 pm )  Signed Electronically by: Richi Jaramillo MD

## 2024-08-05 ENCOUNTER — NURSE TRIAGE (OUTPATIENT)
Dept: FAMILY MEDICINE | Facility: CLINIC | Age: 74
End: 2024-08-05
Payer: COMMERCIAL

## 2024-08-05 NOTE — TELEPHONE ENCOUNTER
General Call    Contacts       Contact Date/Time Type Contact Phone/Fax    08/05/2024 09:51 AM CDT Phone (Incoming) Canelo Logan (Self) [phone] (Confidential)          Reason for Call:  patient wanted to relay that his right knee has fluid coming out of it and it's painful. Would like a call back. An appointment was offered.     What are your questions or concerns:  patient would like a call back    Date of last appointment with provider: 08/02/24    Okay to leave a detailed message?: at Home number on file [phone] (home)

## 2024-08-05 NOTE — TELEPHONE ENCOUNTER
Spoke with patient. Patient explains that he noticed pain and swelling in his right knee. Patient explains there is no drainage from his knee, mostly swelling. Patient additionally experiencing a fever for the past 3 days, and shivering.    Patient explains  that he was simply calling to inform PCP care team that he will be going to ER now, as also advised by home care    ABI Brown RN  [facility]

## 2024-08-06 ENCOUNTER — NURSE TRIAGE (OUTPATIENT)
Dept: ONCOLOGY | Facility: CLINIC | Age: 74
End: 2024-08-06
Payer: COMMERCIAL

## 2024-08-06 ENCOUNTER — TELEPHONE (OUTPATIENT)
Dept: ONCOLOGY | Facility: CLINIC | Age: 74
End: 2024-08-06
Payer: COMMERCIAL

## 2024-08-06 DIAGNOSIS — Z51.11 ENCOUNTER FOR ANTINEOPLASTIC CHEMOTHERAPY: ICD-10-CM

## 2024-08-06 DIAGNOSIS — C61 PROSTATE CANCER (H): ICD-10-CM

## 2024-08-06 DIAGNOSIS — C79.51 METASTASIS TO BONE (H): ICD-10-CM

## 2024-08-06 DIAGNOSIS — C77.8 MALIGNANT NEOPLASM METASTATIC TO LYMPH NODES OF MULTIPLE SITES (H): Primary | ICD-10-CM

## 2024-08-06 RX ORDER — PROCHLORPERAZINE MALEATE 10 MG
5 TABLET ORAL EVERY 6 HOURS PRN
Qty: 60 TABLET | Refills: 5 | Status: SHIPPED | OUTPATIENT
Start: 2024-08-13

## 2024-08-06 RX ORDER — DEXAMETHASONE 4 MG/1
8 TABLET ORAL 2 TIMES DAILY WITH MEALS
Qty: 72 TABLET | Refills: 0 | Status: SHIPPED | OUTPATIENT
Start: 2024-08-13 | End: 2024-08-21

## 2024-08-06 NOTE — TELEPHONE ENCOUNTER
Pt requesting  be notified that he is at [facility] now and getting knee surgery. He would like provider to know since pt has upcoming chemo tx.     Informed pt writer would inform his team.

## 2024-08-07 ENCOUNTER — MEDICAL CORRESPONDENCE (OUTPATIENT)
Dept: HEALTH INFORMATION MANAGEMENT | Facility: CLINIC | Age: 74
End: 2024-08-07
Payer: COMMERCIAL

## 2024-08-08 ENCOUNTER — PATIENT OUTREACH (OUTPATIENT)
Dept: ONCOLOGY | Facility: CLINIC | Age: 74
End: 2024-08-08
Payer: COMMERCIAL

## 2024-08-08 DIAGNOSIS — S72.142D CLOSED INTERTROCHANTERIC FRACTURE OF LEFT FEMUR WITH ROUTINE HEALING, SUBSEQUENT ENCOUNTER: Primary | ICD-10-CM

## 2024-08-08 NOTE — PROGRESS NOTES
[facility], Orthopaedic Oncology Surgery Consultation  by Colten Laguna M.D.    Gucci Logan MRN# [id number]    YOB: 1950     Requesting physician: No ref. provider found  Richi Jaramillo            Assessment and Plan:   Assessment:  Pathologic fracture left femur secondary to metastatic adenocarcinoma  Status post short IM nail fixation.  Progressive osteolysis with impending loss of fixation.      Plan:  Maintain toe-touch weightbearing left lower extremity.  Follow-up in 3 months with repeat radiographs.  Follow-up with medical oncologist regarding management of his metastatic disease which appears to be progressive.  Patient was having chest pain during clinic visit and underwent rapid response evaluation.  Recommended patient go to the emergency room for further evaluation given his prior history of coronary artery disease.  Vital signs stable.  Aspirin administered.  Patient inquired about following up with regards to his right knee surgery recently performed at [facility].  Reports that he does not want to return to his original surgeon in [place].  Therefore I advised that we would obtain his records and have patient evaluated for this issue at another appointment once his cardiac evaluation is completed.      MD Anthony Mendoza Family Professor  Oncology and Adult Reconstructive Surgery  Dept Orthopaedic Surgery, [facility]  [phone] office, [phone] pager  [email or web address]    This note was created using dictation software and may contain errors.  Please contact the creator for any clarifications that are needed.            History of Present Illness:   74 year old male  chief complaint    This patient is seen for evaluation of his left hip joint.  Previous operative fixation performed at time of fracture in May 2024 by Dr. Gonzalez at the [facility] facility.  Patient has had progressive discomfort and currently is  residing in a wheelchair.  He previous was told that he may begin to initiate weightbearing.    During the clinic visit, patient complained of chest discomfort and rapid response evaluation was undertaken.  Further history and evaluation of his hip and right knee was discontinued for this reason.    Background history:  DX:  Prostate carcinoma  Status post left hip pathologic fracture  Status full extremity radiation  History of diabetes  History of coronary artery disease    TREATMENTS:  2/15/2008, Left knee arthroscopy with partial medial meniscectomy and minor shaving of the patella, AGUSTINA Jimenez), [facility]  5/28/2008, Liver biopsy, (PARUL Melo), [facility]  5/20/2024, open reduction internal fixation hip nailing left, (Kristy), [facility]   8/6/2024, Right knee I&D, (BETTINA Quezada), [facility]   date, treatment, (surgeon), hospital           Physical Exam:     EXAMINATION pertinent findings:   PSYCH: Pleasant, healthy-appearing, alert, oriented x3, cooperative. Normal mood and affect.  VITAL SIGNS: There were no vitals taken for this visit..  Reviewed nursing intake notes.   There is no height or weight on file to calculate BMI.  RESP: non labored breathing   ABD: benign, soft, non-tender, no acute peritoneal findings  SKIN: grossly normal   LYMPHATIC: grossly normal, no adenopathy, no extremity edema  NEURO: grossly normal , no motor deficits  VASCULAR: satisfactory perfusion of all extremities   MUSCULOSKELETAL:   Left hip incision well-healed.  Patient residing in wheelchair.  No pain with motion of the hip or rotation of the femur.  Further examination and weightbearing not assessed given ongoing chest pain.           Data:   All laboratory data reviewed  All imaging studies reviewed by me          DATA for DOCUMENTATION:         Past Medical History:     Patient Active Problem List   Diagnosis    Hypertension goal BP (blood pressure) < 140/90    Obesity, unspecified  obesity severity, unspecified obesity type    Gastroesophageal reflux disease, esophagitis presence not specified    Hyperlipidemia LDL goal <100    Impaired fasting glucose    Diabetes mellitus, type 2 (H)    Non-insulin dependent type 2 diabetes mellitus (H)    Abdominal pain    Portal vein thrombosis    Vitamin D deficiency    Thrombocytopenia (H24)    Splenomegaly    Osteoarthrosis    SHONDA (obstructive sleep apnea)    Lymphadenopathy    Left ventricular hypertrophy    Jaundice    Chronic hepatitis C virus infection (H)    Compression fracture of T6 vertebra with routine healing    Syncope, unspecified syncope type    Fall    Compression fracture of T5 vertebra with routine healing, subsequent encounter    Diabetes mellitus without complication (H)    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)    Stage 3 chronic kidney disease, unspecified whether stage 3a or 3b CKD (H)    Glaucoma suspect of both eyes    Fall from standing, initial encounter    Intertrochanteric fracture of left femur, closed, initial encounter (H)    Chronic nonalcoholic liver disease    Fall on same level from slipping, tripping or stumbling, initial encounter    Diverticular disease of colon    Chronic hepatitis C (H)    Cirrhosis of liver (H)    Class 2 severe obesity due to excess calories with serious comorbidity in adult (H)    Metastasis to bone (H)    Prostate cancer (H)    Malignant neoplasm metastatic to lymph nodes of multiple sites (H)    Prostate cancer metastatic to bone (H)    Encounter for antineoplastic chemotherapy     Past Medical History:   Diagnosis Date    Arthritis     Chronic, continuous use of opioids     Esophageal reflux 03/01/2014    Hearing problem     Hiatal hernia     Hypertension     Jaundice 05/31/2008    Liver disease     Obesity 01/24/2013    Obstructive sleep apnea     Sleep apnea     Advised CPAP machine. Not keen to use it.     Syncope,  unspecified syncope type 2/20/2023       Also see scanned health assessment forms.       Past Surgical History:     Past Surgical History:   Procedure Laterality Date    ARTHROSCOPY KNEE RT/LT      (L) with partial medial meniscectomy    CATARACT IOL, RT/LT  Nov and Dec 2017    COLONOSCOPY N/A 4/24/2019    Procedure: COLONOSCOPY, WITH POLYPECTOMY AND BIOPSY;  Surgeon: Leventhal, Thomas Michael, MD;  [facility]: [facility]    COLONOSCOPY N/A 9/14/2022    Procedure: COLONOSCOPY;  Surgeon: Rafa Renee MD;  Location: [facility]    DACRYOCYSTORHINOSTOMY Left 10/16/2018    Procedure: DACRYOCYSTORHINOSTOMY;  Surgeon: Mahdu Krause MD;  Location: [facility]    ENDOSCOPIC ENDONASAL SURGERY  1994    ENDOSCOPY  2-19-15    ESOPHAGOSCOPY, GASTROSCOPY, DUODENOSCOPY (EGD), COMBINED N/A 4/24/2019    Procedure: COMBINED ESOPHAGOSCOPY, GASTROSCOPY, DUODENOSCOPY (EGD) - hold aspirin ibuprofen or naproxen for one week prior (per physician order);  Surgeon: Leventhal, Thomas Michael, MD;  Location: [facility]    ESOPHAGOSCOPY, GASTROSCOPY, DUODENOSCOPY (EGD), COMBINED N/A 5/23/2023    Procedure: Esophagoscopy, gastroscopy, duodenoscopy, combined;  Surgeon: Rafa Renee MD;  Location: [facility]    EYE SURGERY      Hernia surgery Left 1994    NASAL/SINUS POLYPECTOMY      OPEN REDUCTION INTERNAL FIXATION HIP NAILING Left 5/20/2024    Procedure: open reduction internal fixation hip nailing left;  Surgeon: Rafa Wagner MD;  Location: [facility]    REPAIR PTOSIS Left 10/16/2018    Procedure: LEFT UPPER LID PTOSIS AND BILATERAL  BROW PTOSIS REPAIR WITH LEFT DACRYOCYSTORHINOSTOMY ;  Surgeon: Madhu Krause MD;  Location: [facility]    REPAIR PTOSIS BROW Bilateral 10/16/2018    Procedure: REPAIR PTOSIS BROW;  Surgeon: Madhu Krause MD;  Location: [facility]    ROTATOR CUFF REPAIR RT/LT Right     ZZC OPEN RX ANKLE DISLOCATN+FIXATN      (R)    ZZC SPINAL FUSION,ANT,EA ADNL LEVEL      T12 - L1            Social History:     Social History      Socioeconomic History    Marital status: Single     Spouse name: Not on file    Number of children: 0    Years of education: 18    Highest education level: Not on file   Occupational History    Occupation: Contractor     Employer: [employer]     Comment: Not working now   Tobacco Use    Smoking status: Former     Current packs/day: 0.00     Types: Cigarettes     Start date: 1990     Quit date: 1999     Years since quittin.6     Passive exposure: Never    Smokeless tobacco: Never   Vaping Use    Vaping status: Never Used   Substance and Sexual Activity    Alcohol use: Yes     Comment: rare    Drug use: No    Sexual activity: Not Currently     Partners: Female   Other Topics Concern    Parent/sibling w/ CABG, MI or angioplasty before 65F 55M? No   Social History Narrative    Not on file     Social Determinants of Health     Financial Resource Strain: Low Risk  (2024)    [facility]    Financial Resource Strain     Difficulty of Paying Living Expenses: 3     Difficulty of Paying Living Expenses: Not on file   Food Insecurity: No Food Insecurity (2024)    [facility]    Food Insecurity     Worried About Running Out of Food in the Last Year: 1   Transportation Needs: No Transportation Needs (2024)    Received from [facility]    Transportation Needs     Lack of Transportation (Medical): 1   Physical Activity: Not on file   Stress: Not on file   Social Connections: Socially Integrated (2024)    [facility]    Social Connections     Frequency of Communication with Friends and Family: 0   Interpersonal Safety: Low Risk  (2024)    Interpersonal Safety     Do you feel physically and emotionally safe where you currently live?: Yes     Within the past 12 months, have you been hit, slapped, kicked or otherwise physically hurt  by someone?: No     Within the past 12 months, have you been humiliated or emotionally abused in other ways by your partner or ex-partner?: No   Housing Stability: Low Risk  (8/6/2024)    Received from [facility]    Housing Stability     Unable to Pay for Housing in the Last Year: 1            Family History:       Family History   Problem Relation Age of Onset    Alzheimer Disease Mother 85    Dementia Mother     Cerebrovascular Disease Father 50    Cancer Father     Hypertension Father     Neurologic Disorder No family hx of     Diabetes No family hx of             Medications:     Current Outpatient Medications   Medication Sig Dispense Refill    acetaminophen (TYLENOL) 325 MG tablet Take 2 tablets (650 mg) by mouth every 4 hours as needed for other (For optimal non-opioid multimodal pain management to improve pain control.) 100 tablet 0    aspirin 81 MG EC tablet Take 1 tablet (81 mg) by mouth daily 90 tablet 3    atorvastatin (LIPITOR) 10 MG tablet Take 1 tablet (10 mg) by mouth daily 90 tablet 1    calcium citrate (CITRACAL) 950 (200 Ca) MG tablet Take 1 tablet (950 mg) by mouth daily 120 tablet 3    [START ON 8/14/2024] darolutamide (NUBEQA) 300 MG tablet Take 2 tablets (600 mg) by mouth 2 times daily for 21 days . Swallow tablets whole. Take with food. Avoid grapefruit or grapefruit juice. 84 tablet 0    [START ON 8/13/2024] dexAMETHasone (DECADRON) 4 MG tablet Take 2 tablets (8 mg) by mouth 2 times daily (with meals) for 3 days Start evening of Docetaxel infusion and continue for a total of 3 doses.  Repeat with each cycle of chemotherapy. 72 tablet 0    diclofenac (VOLTAREN) 1 % topical gel Apply 4 g topically 3 times daily as needed for moderate pain (bursitis) 150 g 1    lidocaine-prilocaine (EMLA) 2.5-2.5 % external cream Apply to Port-A-Cath site 1 hour prior to venous access. 30 g 3    metFORMIN (GLUCOPHAGE) 500 MG tablet Take 1 tablet (500 mg) by mouth 2 times daily  (with meals) (Patient taking differently: Take 1,000 mg by mouth daily (with breakfast)) 180 tablet 1    metoprolol succinate ER (TOPROL XL) 100 MG 24 hr tablet 1 1/2 ( 150 mg ) po daily      multivitamin w/minerals (THERA-VIT-M) tablet Take 1 tablet by mouth daily      neomycin-polymixin-dexAMETHasone (MAXITROL) 0.1 % ophthalmic suspension 1 drop to left eye every 6 hours x1 week. 10 mL 0    omeprazole (PRILOSEC) 20 MG DR capsule TAKE 1 CAPSULE BY MOUTH ONCE DAILY 30 TO 60 MINUTES BEFORE A MEAL 90 capsule 1    ondansetron (ZOFRAN) 8 MG tablet Take 1 tablet (8 mg) by mouth every 8 hours as needed for nausea 60 tablet 5    oxyCODONE (ROXICODONE) 5 MG tablet Take 1-2 tablets (5-10 mg) by mouth every 4 hours as needed for pain 70 tablet 0    predniSONE (DELTASONE) 5 MG tablet Take 1 tablet (5 mg) by mouth daily (with breakfast) Do not take prednisone on days when taking dexamethasone. 60 tablet 1    [START ON 8/13/2024] prochlorperazine (COMPAZINE) 10 MG tablet Take 0.5 tablets (5 mg) by mouth every 6 hours as needed for nausea or vomiting 60 tablet 5    senna-docusate (SENOKOT-S/PERICOLACE) 8.6-50 MG tablet Take 1 tablet by mouth 2 times daily as needed for constipation 30 tablet 1    tiZANidine (ZANAFLEX) 4 MG tablet TAKE 1/2 (ONE-HALF) TO 1  TABLET BY MOUTH UP TO THREE TIMES DAILY AS NEEDED (Patient taking differently: Take 2-4 mg by mouth 3 times daily as needed for muscle spasms Usually takes at 4pm with tramadol) 30 tablet 0    valsartan (DIOVAN) 160 MG tablet Take 1 tablet (160 mg) by mouth daily 90 tablet 1    vitamin D3 (CHOLECALCIFEROL) 50 mcg (2000 units) tablet Take 1 tablet (50 mcg) by mouth daily 120 tablet 3     No current facility-administered medications for this visit.              Review of Systems:   A comprehensive 10 point review of systems (constitutional, ENT, cardiac, peripheral vascular, lymphatic, respiratory, GI, , Musculoskeletal, skin, Neurological) was performed and found to be negative  except as described in this note.     See intake form completed by patient

## 2024-08-08 NOTE — PROGRESS NOTES
[facility]: Cancer Care                                                                                          Called patient to discuss hospital stay and possible discharge plans.  Patient will either be discharged home or to a nursing home/TCU which may impact infusion schedule.  Advised patient to call back as soon as discharge plans are finalized and we will adjust schedule as needed.  Patient verbalized understanding.    Piedad Rivas RN  Cancer Care Coordinator  [facility]

## 2024-08-09 ENCOUNTER — PATIENT OUTREACH (OUTPATIENT)
Dept: ONCOLOGY | Facility: CLINIC | Age: 74
End: 2024-08-09
Payer: COMMERCIAL

## 2024-08-09 NOTE — PROGRESS NOTES
[facility]: Cancer Care                                                                                          SW Robyn from [facility] called with questions regarding a gil for patient's chemo.  Informed Robyn that a message has been sent out to the infusion finance team for more information and will return call.    Patient also called with same questions.  Answered all of patient's questions.  Patient also asked for an order for patient to get a port placed at [facility].  Informed patient that all orders and planning for a port placement there would happen within [facility] and advised patient to talk to the team there.  Patient verbalized understanding.    Piedad Rivas, RN  Cancer Care Coordinator  [facility]

## 2024-08-12 RX ORDER — NALOXONE HYDROCHLORIDE 0.4 MG/ML
0.4 INJECTION, SOLUTION INTRAMUSCULAR; INTRAVENOUS; SUBCUTANEOUS
Status: CANCELLED | OUTPATIENT
Start: 2024-08-12

## 2024-08-12 RX ORDER — FLUMAZENIL 0.1 MG/ML
0.2 INJECTION, SOLUTION INTRAVENOUS
Status: CANCELLED | OUTPATIENT
Start: 2024-08-12

## 2024-08-12 RX ORDER — HEPARIN SODIUM (PORCINE) LOCK FLUSH IV SOLN 100 UNIT/ML 100 UNIT/ML
5-10 SOLUTION INTRAVENOUS
Status: CANCELLED | OUTPATIENT
Start: 2024-08-12

## 2024-08-12 RX ORDER — NALOXONE HYDROCHLORIDE 0.4 MG/ML
0.2 INJECTION, SOLUTION INTRAMUSCULAR; INTRAVENOUS; SUBCUTANEOUS
Status: CANCELLED | OUTPATIENT
Start: 2024-08-12

## 2024-08-12 RX ORDER — SODIUM CHLORIDE 9 MG/ML
INJECTION, SOLUTION INTRAVENOUS CONTINUOUS
Status: CANCELLED | OUTPATIENT
Start: 2024-08-12

## 2024-08-12 RX ORDER — HEPARIN SODIUM,PORCINE 10 UNIT/ML
5-10 VIAL (ML) INTRAVENOUS
Status: CANCELLED | OUTPATIENT
Start: 2024-08-12

## 2024-08-12 RX ORDER — HEPARIN SODIUM,PORCINE 10 UNIT/ML
5-10 VIAL (ML) INTRAVENOUS EVERY 24 HOURS
Status: CANCELLED | OUTPATIENT
Start: 2024-08-12

## 2024-08-12 RX ORDER — FENTANYL CITRATE 50 UG/ML
25-50 INJECTION, SOLUTION INTRAMUSCULAR; INTRAVENOUS EVERY 5 MIN PRN
Status: CANCELLED | OUTPATIENT
Start: 2024-08-12

## 2024-08-12 RX ORDER — CEFAZOLIN SODIUM 2 G/100ML
2 INJECTION, SOLUTION INTRAVENOUS
Status: CANCELLED | OUTPATIENT
Start: 2024-08-12

## 2024-08-12 RX ORDER — ACETAMINOPHEN 325 MG/1
325 TABLET ORAL
Status: CANCELLED | OUTPATIENT
Start: 2024-08-12

## 2024-08-12 RX ORDER — LIDOCAINE 40 MG/G
CREAM TOPICAL
Status: CANCELLED | OUTPATIENT
Start: 2024-08-12

## 2024-08-13 DIAGNOSIS — C61 PROSTATE CANCER (H): ICD-10-CM

## 2024-08-13 DIAGNOSIS — C79.51 METASTASIS TO BONE (H): Primary | ICD-10-CM

## 2024-08-14 ENCOUNTER — INFUSION THERAPY VISIT (OUTPATIENT)
Dept: ONCOLOGY | Facility: CLINIC | Age: 74
End: 2024-08-14
Attending: INTERNAL MEDICINE
Payer: COMMERCIAL

## 2024-08-14 ENCOUNTER — DOCUMENTATION ONLY (OUTPATIENT)
Dept: ONCOLOGY | Facility: CLINIC | Age: 74
End: 2024-08-14

## 2024-08-14 ENCOUNTER — APPOINTMENT (OUTPATIENT)
Dept: LAB | Facility: CLINIC | Age: 74
End: 2024-08-14
Attending: INTERNAL MEDICINE
Payer: COMMERCIAL

## 2024-08-14 VITALS
TEMPERATURE: 98.3 F | OXYGEN SATURATION: 97 % | DIASTOLIC BLOOD PRESSURE: 75 MMHG | HEIGHT: 70 IN | BODY MASS INDEX: 33.6 KG/M2 | SYSTOLIC BLOOD PRESSURE: 165 MMHG | HEART RATE: 72 BPM | WEIGHT: 234.7 LBS | RESPIRATION RATE: 16 BRPM

## 2024-08-14 DIAGNOSIS — C61 PROSTATE CANCER (H): ICD-10-CM

## 2024-08-14 DIAGNOSIS — C77.8 MALIGNANT NEOPLASM METASTATIC TO LYMPH NODES OF MULTIPLE SITES (H): ICD-10-CM

## 2024-08-14 DIAGNOSIS — C79.51 METASTASIS TO BONE (H): ICD-10-CM

## 2024-08-14 DIAGNOSIS — Z51.11 ENCOUNTER FOR ANTINEOPLASTIC CHEMOTHERAPY: Primary | ICD-10-CM

## 2024-08-14 LAB — PSA SERPL DL<=0.01 NG/ML-MCNC: 16.86 NG/ML (ref 0–6.5)

## 2024-08-14 PROCEDURE — 36591 DRAW BLOOD OFF VENOUS DEVICE: CPT | Performed by: INTERNAL MEDICINE

## 2024-08-14 PROCEDURE — 96413 CHEMO IV INFUSION 1 HR: CPT

## 2024-08-14 PROCEDURE — 84153 ASSAY OF PSA TOTAL: CPT

## 2024-08-14 PROCEDURE — 250N000011 HC RX IP 250 OP 636: Performed by: INTERNAL MEDICINE

## 2024-08-14 PROCEDURE — 96377 APPLICATON ON-BODY INJECTOR: CPT | Mod: 59

## 2024-08-14 PROCEDURE — 258N000003 HC RX IP 258 OP 636: Performed by: INTERNAL MEDICINE

## 2024-08-14 PROCEDURE — 84403 ASSAY OF TOTAL TESTOSTERONE: CPT | Performed by: INTERNAL MEDICINE

## 2024-08-14 PROCEDURE — 96372 THER/PROPH/DIAG INJ SC/IM: CPT | Performed by: INTERNAL MEDICINE

## 2024-08-14 PROCEDURE — 96367 TX/PROPH/DG ADDL SEQ IV INF: CPT

## 2024-08-14 RX ORDER — METHOCARBAMOL 500 MG/1
500 TABLET, FILM COATED ORAL EVERY 6 HOURS PRN
COMMUNITY
End: 2024-08-15

## 2024-08-14 RX ORDER — HEPARIN SODIUM (PORCINE) LOCK FLUSH IV SOLN 100 UNIT/ML 100 UNIT/ML
5 SOLUTION INTRAVENOUS
Status: DISCONTINUED | OUTPATIENT
Start: 2024-08-14 | End: 2024-08-14 | Stop reason: HOSPADM

## 2024-08-14 RX ORDER — HEPARIN SODIUM (PORCINE) LOCK FLUSH IV SOLN 100 UNIT/ML 100 UNIT/ML
500 SOLUTION INTRAVENOUS ONCE
Status: COMPLETED | OUTPATIENT
Start: 2024-08-14 | End: 2024-08-14

## 2024-08-14 RX ADMIN — DOCETAXEL 138 MG: 20 INJECTION, SOLUTION, CONCENTRATE INTRAVENOUS at 14:11

## 2024-08-14 RX ADMIN — Medication 500 UNITS: at 12:28

## 2024-08-14 RX ADMIN — SODIUM CHLORIDE 250 ML: 9 INJECTION, SOLUTION INTRAVENOUS at 13:26

## 2024-08-14 RX ADMIN — DEXAMETHASONE SODIUM PHOSPHATE: 10 INJECTION, SOLUTION INTRAMUSCULAR; INTRAVENOUS at 13:29

## 2024-08-14 RX ADMIN — PEGFILGRASTIM 6 MG: KIT SUBCUTANEOUS at 14:32

## 2024-08-14 RX ADMIN — Medication 5 ML: at 15:15

## 2024-08-14 ASSESSMENT — PAIN SCALES - GENERAL: PAINLEVEL: SEVERE PAIN (6)

## 2024-08-14 NOTE — PROGRESS NOTES
Oral Chemotherapy Monitoring Program    Lab Monitoring Plan: Per treatment plan, with infusion        Subjective/Objective:  Gucci Logan is a 74 year old male seen in clinic for an initial visit for oral chemotherapy education on Nubeqa. Patient was able to set up delivery for Nubeqa today with [facility], it will be delivered tonight by 8 pm. He plans to take his first dose tonight. Completed a somewhat abbreviated teach as [facility] just educated him on this same information over the phone a few hours prior. Confirmed patient is okay with having pharmacists call him to complete assessments. Patient monitors BP at home, typically runs systolic 150-160, will continue to monitor. Baseline labs ok, meets parameters. Of note, he has chronically low blood counts due to underlying cirrhosis. Provided phone numbers and encouraged patient to reach out with any questions or concerns.        7/19/2024     1:00 PM 8/6/2024     9:00 AM 8/14/2024     3:00 PM   ORAL CHEMOTHERAPY   Assessment Type Initial Work up Refill New Teach   Diagnosis Code Prostate Cancer Prostate Cancer Prostate Cancer   Providers Dr. Dimitrios Plunkett   Clinic [facility]/Location [facility]   Is this patient followed by the [facility] OC team? No No No   Drug Name Nubeqa (darolutamide) Nubeqa (darolutamide) Nubeqa (darolutamide)   Dose 600 mg 600 mg 600 mg   Current Schedule BID BID BID   Cycle Details Continuous Continuous Continuous   Start Date of Last Cycle   8/14/2024   Planned next cycle start date   9/4/2024   Any new drug interactions?  Yes    Pharmacist Intervention?  No    Is the dose as ordered appropriate for the patient?  Yes        Last PHQ-2 Score on record:       1/5/2024    10:07 AM 1/5/2024    10:06 AM   PHQ-2 ( 1999 [employer])   Q1: Little interest or pleasure in doing things 0 0   Q2: Feeling down, depressed or hopeless 0 0   PHQ-2 Score 0 0   Q1: Little interest or pleasure in doing things Not at all    Q2: Feeling down,  Pt returned call to the pods pt is ok to schedule Hsp fu 7/22 SEBASTIÁN with Martha at 1130 am, pt was advised of repeat BMP prior to appointment as well.    "depressed or hopeless Not at all    PHQ-2 Score 0        Vitals:  BP:   BP Readings from Last 1 Encounters:   08/14/24 (!) 165/75     Wt Readings from Last 1 Encounters:   08/14/24 106.5 kg (234 lb 11.2 oz)     Estimated body surface area is 2.29 meters squared as calculated from the following:    Height as of an earlier encounter on 8/14/24: 1.778 m (5' 10\").    Weight as of an earlier encounter on 8/14/24: 106.5 kg (234 lb 11.2 oz).    Labs:  _  Result Component Current Result Ref Range   Sodium 142 (8/14/2024) 135 - 145 mmol/L     _  Result Component Current Result Ref Range   Potassium 4.4 (8/14/2024) 3.4 - 5.3 mmol/L     _  Result Component Current Result Ref Range   Calcium 9.4 (8/14/2024) 8.8 - 10.4 mg/dL     No results found for Mag within last 30 days.     No results found for Phos within last 30 days.     _  Result Component Current Result Ref Range   Albumin 3.1 (L) (8/14/2024) 3.5 - 5.2 g/dL     _  Result Component Current Result Ref Range   Urea Nitrogen 18.7 (8/14/2024) 8.0 - 23.0 mg/dL     _  Result Component Current Result Ref Range   Creatinine 0.84 (8/14/2024) 0.67 - 1.17 mg/dL     _  Result Component Current Result Ref Range   AST 46 (H) (8/14/2024) 0 - 45 U/L     _  Result Component Current Result Ref Range   ALT 25 (8/14/2024) 0 - 70 U/L     _  Result Component Current Result Ref Range   Bilirubin Total 0.6 (8/14/2024) <=1.2 mg/dL     _  Result Component Current Result Ref Range   WBC Count 3.4 (L) (8/14/2024) 4.0 - 11.0 10e3/uL     _  Result Component Current Result Ref Range   Hemoglobin 10.1 (L) (8/14/2024) 13.3 - 17.7 g/dL     _  Result Component Current Result Ref Range   Platelet Count 78 (L) (8/14/2024) 150 - 450 10e3/uL     No results found for ANC within last 30 days.     _  Result Component Current Result Ref Range   Absolute Neutrophils 2.5 (8/14/2024) 1.6 - 8.3 10e3/uL        Assessment:  Patient is appropriate to start therapy today.    Plan:  Basic chemotherapy teaching was " reviewed with the patient including indication, start date of therapy, dose, administration, adverse effects, missed doses, food and drug interactions, monitoring, side effect management, office contact information, and safe handling. Written materials were provided and all questions answered.    Follow-Up:  8/21: 1 week follow up phone call initial assessment   9/5: Provider visit and labs with gris Abdalla PharmD  Hematology/Oncology Clinical Pharmacist  [facility]  [phone]

## 2024-08-14 NOTE — PATIENT INSTRUCTIONS
-- Dexamethasone (decadron) 2 tablets this evening, 2 tablets tomorrow morning, and 2 tablets tomorrow evening  -- Prochlorperazine (compazine) 1 tablet every 6 hours as needed for nausea  -- Darolutamide (NUBEQA) 2 tablets in the morning and 2 tablets in the evening every day        Neulasta injection will start tomorrow at 5:30 pm, approximately 27 hours after application applied today.  When the dose delivery starts, it will take about 45 minutes to complete. Please removed after 6:30 pm . Neulasta Onpro On-Body should have green flashing light. Call triage ([phone]) if injector flashes red or appears to be leaking. Keep Onpro On-Body Neulasta 4 inches away from electrical equipment and avoid showering 4 hours prior to injection.           [facility] and after hours / weekends / holidays:  [phone] option 5, option 2    Please call the triage or after hours line if you experience a temperature greater than or equal to 100.4, shaking chills, have uncontrolled nausea, vomiting and/or diarrhea, dizziness, shortness of breath, chest pain, bleeding, unexplained bruising, or if you have any other new/concerning symptoms, questions or concerns.      If you are having any concerning symptoms or wish to speak to a provider before your next infusion visit, please call triage to notify your care team so we can adequately serve you.     If you need a refill on a narcotic prescription or other medication, please call before your infusion appointment.          [facility] ([facility])  [phone]

## 2024-08-14 NOTE — PROGRESS NOTES
Infusion Nursing Note:  Gucci Logan presents today for C1D1 Taxotere.    Patient seen by provider today: No   present during visit today: Not Applicable.    Note: Discharged from OSH yesterday. It was recommended that he go to TCU for rehab however d/t insurance he was unable to go to TCU and get chemo, so he declined TCU placement.    Patient is up with assist x 2 and gait belt. Lives alone. He uses a wheelchair to get around his house. He has [employer] and [employer]. Unable to shower independently. Will sponge bathe in the morning. He stated that he has home care services that have not restarted yet since his hospitalization. He has neighbors who check on him. He stated he has a few people to help him in case of emergencies.    Writer had concerns about patient not having support at home and the level of assistance he needed at clinic. Voiced concerns to patient and notified Dr. Plunkett.    After voicing concerns to patient, he informed writer that he feels safe at home caring for himself. He stated he talks to his neighbors once a day and they will check in on him if they don't hear from him. He reported they are able to help him care for himself. He is eager to start treatment today.    FLOR Plunkett/Eli Chaudhry RN 08/14/24 @ 0062  - acknowledge concerns and difficulty of situation  - taxotere is dose reduced and he is getting neulasta so hopefully that will help  - agree with DONTE follow up next week    Patient agreed to DONTE visit next week however he refused to come to clinic for visit. He agreed to a telephone visit. IB to scheduling to schedule DONTE follow up next week.    Patient talked to [facility] during infusion. He discussed with pharmacy that he might have difficulty getting medication from  since he is wheelchair bound. He will have medication delivered to his residence [facility]. Instructed him to start taking today.    Patient reported he is not  currently taking prednisone because he does not have it. Reviewed that prescription should be available at his local [facility] pharmacy. He stated he will try to get his prescription. IB to [facility] to follow up with patient.    Reported 6/10 hip pain. He is taking his pain medications as prescribed. No intervention for pain during infusion today.      Intravenous Access:  Implanted Port.    Treatment Conditions:   Latest Reference Range & Units 08/14/24 12:19   Sodium 135 - 145 mmol/L 142   Potassium 3.4 - 5.3 mmol/L 4.4   Chloride 98 - 107 mmol/L 109 (H)   Carbon Dioxide (CO2) 22 - 29 mmol/L 25   Urea Nitrogen 8.0 - 23.0 mg/dL 18.7   Creatinine 0.67 - 1.17 mg/dL 0.84   GFR Estimate >60 mL/min/1.73m2 >90   Calcium 8.8 - 10.4 mg/dL 9.4   Anion Gap 7 - 15 mmol/L 8   Albumin 3.5 - 5.2 g/dL 3.1 (L)   Protein Total 6.4 - 8.3 g/dL 6.3 (L)   Alkaline Phosphatase 40 - 150 U/L 227 (H)   ALT 0 - 70 U/L 25   AST 0 - 45 U/L 46 (H)   Bilirubin Total <=1.2 mg/dL 0.6   Glucose 70 - 99 mg/dL 96   PSA Tumor Marker 0.00 - 6.50 ng/mL 16.86 (H)   WBC 4.0 - 11.0 10e3/uL 3.4 (L)   Hemoglobin 13.3 - 17.7 g/dL 10.1 (L)   Hematocrit 40.0 - 53.0 % 32.7 (L)   Platelet Count 150 - 450 10e3/uL 78 (L)   RBC Count 4.40 - 5.90 10e6/uL 3.45 (L)   MCV 78 - 100 fL 95   MCH 26.5 - 33.0 pg 29.3   MCHC 31.5 - 36.5 g/dL 30.9 (L)   RDW 10.0 - 15.0 % 14.6   % Neutrophils % 73   % Lymphocytes % 13   % Monocytes % 12   % Eosinophils % 2   % Basophils % 0   Absolute Basophils 0.0 - 0.2 10e3/uL 0.0   Absolute Eosinophils 0.0 - 0.7 10e3/uL 0.1   Absolute Immature Granulocytes <=0.4 10e3/uL 0.0   Absolute Lymphocytes 0.8 - 5.3 10e3/uL 0.4 (L)   Absolute Monocytes 0.0 - 1.3 10e3/uL 0.4   % Immature Granulocytes % 0   Absolute Neutrophils 1.6 - 8.3 10e3/uL 2.5   Absolute NRBCs 10e3/uL 0.0     Results reviewed, labs MET treatment parameters, ok to proceed with treatment.      Post Infusion Assessment:  Patient tolerated infusion without incident.  Blood return noted  pre and post infusion.  Site patent and intact, free from redness, edema or discomfort.  No evidence of extravasations.  Access discontinued per protocol.     Neulasta Onpro On-Body injector applied to left abdomen at 1432.  Writer discussed Neulasta injection would start tomorrow 8/15 at 1732, approximately 27 hours after application applied today.    Written and Verbal instruction reviewed with patient.  Pt instructed when the dose delivery starts, it will take about 45 minutes to complete.  Pt aware Neulasta Onpro On-Body should have green flashing light and to call triage or on-call MD if injector flashes red or appears to be leaking.   Pt aware to keep Onpro On-Body Neulasta 4 inches away from electrical equipment and to avoid showering 4 hours prior to injection.   Neulasta Onpro Lot number: see eMAR    Discharge Plan:   Prescriptions given for decadron and compazine.  Discharge instructions reviewed with: Patient.  Patient and/or family verbalized understanding of discharge instructions and all questions answered.  Copy of AVS reviewed with patient and/or family.  Patient will return 9/5 for next appointment.  Patient discharged in stable condition accompanied by: self.  Departure Mode: Wheelchair.      Bianca Chaudhry RN

## 2024-08-14 NOTE — NURSING NOTE
"Chief Complaint   Patient presents with    Port Draw     Labs drawn via port by RN in lab.  VS taken       Port accessed with 20 gauge 3/4\" Power needle by RN, labs collected, line flushed with saline and heparin.  Vitals taken. Pt checked in for appointment(s).    Stefania James RN    "

## 2024-08-14 NOTE — TELEPHONE ENCOUNTER
Discussed dx and possible CBT to help, also, spectrum type disorder          Agree with RN advice    Patient can see us in the next month to follow up on blood pressure     Richi Jaramillo MD

## 2024-08-15 ENCOUNTER — MEDICAL CORRESPONDENCE (OUTPATIENT)
Dept: HEALTH INFORMATION MANAGEMENT | Facility: CLINIC | Age: 74
End: 2024-08-15

## 2024-08-15 ENCOUNTER — NURSE TRIAGE (OUTPATIENT)
Dept: NURSING | Facility: CLINIC | Age: 74
End: 2024-08-15

## 2024-08-15 ENCOUNTER — VIRTUAL VISIT (OUTPATIENT)
Dept: FAMILY MEDICINE | Facility: CLINIC | Age: 74
End: 2024-08-15
Payer: COMMERCIAL

## 2024-08-15 DIAGNOSIS — I10 HYPERTENSION GOAL BP (BLOOD PRESSURE) < 140/90: ICD-10-CM

## 2024-08-15 DIAGNOSIS — M11.20 PSEUDOGOUT: ICD-10-CM

## 2024-08-15 DIAGNOSIS — C79.51 PROSTATE CANCER METASTATIC TO BONE (H): ICD-10-CM

## 2024-08-15 DIAGNOSIS — M79.10 MUSCLE PAIN: Primary | ICD-10-CM

## 2024-08-15 DIAGNOSIS — M25.569 KNEE PAIN, UNSPECIFIED CHRONICITY, UNSPECIFIED LATERALITY: ICD-10-CM

## 2024-08-15 DIAGNOSIS — Z87.81 S/P LEFT HIP FRACTURE: ICD-10-CM

## 2024-08-15 DIAGNOSIS — C61 PROSTATE CANCER METASTATIC TO BONE (H): ICD-10-CM

## 2024-08-15 PROCEDURE — 99443 PR PHYSICIAN TELEPHONE EVALUATION 21-30 MIN: CPT | Mod: 93 | Performed by: FAMILY MEDICINE

## 2024-08-15 RX ORDER — OXYCODONE HYDROCHLORIDE 5 MG/1
5-10 TABLET ORAL EVERY 4 HOURS PRN
Qty: 70 TABLET | Refills: 0 | Status: SHIPPED | OUTPATIENT
Start: 2024-08-17 | End: 2024-08-28

## 2024-08-15 RX ORDER — VALSARTAN 160 MG/1
160 TABLET ORAL DAILY
Qty: 90 TABLET | Refills: 1 | Status: SHIPPED | OUTPATIENT
Start: 2024-08-15

## 2024-08-15 RX ORDER — METOPROLOL SUCCINATE 100 MG/1
TABLET, EXTENDED RELEASE ORAL
Qty: 135 TABLET | Refills: 1 | Status: SHIPPED | OUTPATIENT
Start: 2024-08-15

## 2024-08-15 NOTE — PATIENT INSTRUCTIONS
Pain pill prescription sent in to  on or after Aug 17    Do another phone visit Aug 27 or 28    Prescription for tizanidine sent in     See orthopedics for knee and hip     Go back to 150 mg metoprolol    Keep valsartan as is 160 mg

## 2024-08-15 NOTE — TELEPHONE ENCOUNTER
Patient calling to inform that the Neulasta Onpro injection has completed.   The Neulasta Onpro On-body was started yesterday after chemo infusion.       Instructions    -- Dexamethasone (decadron) 2 tablets this evening, 2 tablets tomorrow morning, and 2 tablets tomorrow evening  -- Prochlorperazine (compazine) 1 tablet every 6 hours as needed for nausea  -- Darolutamide (NUBEQA) 2 tablets in the morning and 2 tablets in the evening every day           Neulasta injection will start tomorrow at 5:30 pm, approximately 27 hours after application applied today.  When the dose delivery starts, it will take about 45 minutes to complete. Please removed after 6:30 pm . Neulasta Onpro On-Body should have green flashing light. Call triage ([phone]) if injector flashes red or appears to be leaking. Keep Onpro On-Body Neulasta 4 inches away from electrical equipment and avoid showering 4 hours prior to injection.           Patient removed while on the phone with this RN and has disposed of the needle in an empty pill container with the cap on. No side effects or concerns. No bleeding, leaking, or tenderness.   Radha Escobar, CASANDRA   08/15/24 6:25 PM  [facility] Nurse Advisor  Reason for Disposition    Caller has medicine question only, adult not sick, AND triager answers question    Protocols used: Medication Question Call-A-

## 2024-08-15 NOTE — PROGRESS NOTES
Canelo is a 74 year old who is being evaluated via a billable telephone visit.    What phone number would you like to be contacted at? [phone]  How would you like to obtain your AVS? Mail a copy  Originating Location (pt. Location): Home    Distant Location (provider location):  On-site        Subjective   Canelo is a 74 year old, presenting for the following health issues:  Hospital F/U        8/15/2024    11:12 AM   Additional Questions   Roomed by Sofia Conde     History of Present Illness       Reason for visit:  Follow up    He eats 0-1 servings of fruits and vegetables daily.He consumes 0 sweetened beverage(s) daily.He exercises with enough effort to increase his heart rate 9 or less minutes per day.  He exercises with enough effort to increase his heart rate 3 or less days per week.   He is taking medications regularly.         Hospital Follow-up Visit:    Hospital/Nursing Home/IP Rehab Facility:  [facility]  Date of Admission: 08/05/2024  Date of Discharge: 08/13/2024  Reason(s) for Admission: uncontrollable fevers  Was the patient in the ICU or did the patient experience delirium during hospitalization?  No  Do you have any other stressors you would like to discuss with your provider? No    Problems taking medications regularly:  None  Medication changes since discharge: None  Problems adhering to non-medication therapy:  None    Summary of hospitalization:  CareEverywhere information obtained and reviewed  Diagnostic Tests/Treatments reviewed.  Follow up needed: none  Other Healthcare Providers Involved in Patient s Care:         None  Update since discharge: improved.         Plan of care communicated with patient           Reviewed hospital summary in detail      Patient start chemo yesterday for prostate cancer    Got port in hospital     Used port for the chemo    Started with acid reflux     Started omeprazole that he had at home    Got prescription for methocarbamol and some others but did not   those prescriptions    No driving    Gets [facility] to appointments    Patient is back home    Knee painful, hard to bend    Per , last pain prescription was 80 pills on 7-26-24  Had received 18 pills on 7-23-24      Got home two days ago    Taking 2 pain pills every 6 hours and one at night     1 tylenol 500 mg 3 x daily       Now has 22 pain pills left oxycodone 5 mg     Systolic high, diastolic normal        Objective             Physical Exam   General: Alert and no distress //Respiratory: No audible wheeze, cough, or shortness of breath // Psychiatric:  Appropriate affect, tone, and pace of words        ASSESSMENT / PLAN:  (M79.10) Muscle pain  (primary encounter diagnosis)  Comment: refill this.  Works better than the methocarbamol  Plan: tiZANidine (ZANAFLEX) 4 MG tablet             (Z87.81) S/p left hip fracture  Comment: of note patient had prescription from us a little bit ago but did not  as he was in hospital.  Got prescription from hospital but did not pick that up.  Last prescription per  was 7-26. He has 3 days of pills left at 7 pills daily.  Lots of pain. Did prescription to fill on or after  8-17.  Do another phone visit with me on Aug 27 or 28.    Plan: oxyCODONE (ROXICODONE) 5 MG tablet             (C61,  C79.51) Prostate cancer metastatic to bone (H)  Comment: as above  Plan: oxyCODONE (ROXICODONE) 5 MG tablet        Now getting chemo     (I10) Hypertension goal BP (blood pressure) < 140/90  Comment: high systolic.  Go back up to 150 mg metoprolol.  Keep arb as is.   Plan: valsartan (DIOVAN) 160 MG tablet, metoprolol         succinate ER (TOPROL XL) 100 MG 24 hr tablet             (M25.569) Knee pain, unspecified chronicity, unspecified laterality  Comment: dx with pseudogout in hosp  Plan: follow up with orthopedics for this and hip     (M11.20) Pseudogout  Comment: as above   Plan: as above       I reviewed the patient's medications, allergies, medical history, family  history, and social history.    Richi Jaramillo MD                  Phone call duration: 23 minutes  Signed Electronically by: Richi Jaramillo MD

## 2024-08-15 NOTE — TELEPHONE ENCOUNTER
DIAGNOSIS: LEFT HIP FOLLOW UP   APPOINTMENT DATE: 08/19/2024   NOTES STATUS DETAILS   OFFICE NOTE from referring provider Internal 07/08/2024 - Rafa Wagner MD - [facility]   OFFICE NOTE from other specialist Internal 06/04/2024 - Rafa Henson PA-C - [facility]   OPERATIVE REPORT Internal 05/20/2024 - ORIF LEFT HIP NAILING   (IMAGES & REPORTS) Internal

## 2024-08-17 ENCOUNTER — MEDICAL CORRESPONDENCE (OUTPATIENT)
Dept: HEALTH INFORMATION MANAGEMENT | Facility: CLINIC | Age: 74
End: 2024-08-17

## 2024-08-17 LAB — TESTOST SERPL-MCNC: 16 NG/DL (ref 240–950)

## 2024-08-19 ENCOUNTER — APPOINTMENT (OUTPATIENT)
Dept: GENERAL RADIOLOGY | Facility: CLINIC | Age: 74
DRG: 314 | End: 2024-08-19
Attending: EMERGENCY MEDICINE
Payer: COMMERCIAL

## 2024-08-19 ENCOUNTER — PRE VISIT (OUTPATIENT)
Dept: ORTHOPEDICS | Facility: CLINIC | Age: 74
End: 2024-08-19

## 2024-08-19 ENCOUNTER — APPOINTMENT (OUTPATIENT)
Dept: CT IMAGING | Facility: CLINIC | Age: 74
DRG: 314 | End: 2024-08-19
Attending: EMERGENCY MEDICINE
Payer: COMMERCIAL

## 2024-08-19 ENCOUNTER — HOSPITAL ENCOUNTER (INPATIENT)
Facility: CLINIC | Age: 74
LOS: 1 days | Discharge: HOME | DRG: 314 | End: 2024-08-21
Attending: EMERGENCY MEDICINE | Admitting: INTERNAL MEDICINE
Payer: COMMERCIAL

## 2024-08-19 ENCOUNTER — ANCILLARY PROCEDURE (OUTPATIENT)
Dept: GENERAL RADIOLOGY | Facility: CLINIC | Age: 74
End: 2024-08-19
Attending: ORTHOPAEDIC SURGERY
Payer: COMMERCIAL

## 2024-08-19 ENCOUNTER — OFFICE VISIT (OUTPATIENT)
Dept: ORTHOPEDICS | Facility: CLINIC | Age: 74
End: 2024-08-19
Payer: COMMERCIAL

## 2024-08-19 ENCOUNTER — APPOINTMENT (OUTPATIENT)
Dept: ULTRASOUND IMAGING | Facility: CLINIC | Age: 74
DRG: 314 | End: 2024-08-19
Attending: EMERGENCY MEDICINE
Payer: COMMERCIAL

## 2024-08-19 ENCOUNTER — DOCUMENTATION ONLY (OUTPATIENT)
Dept: ORTHOPEDICS | Facility: CLINIC | Age: 74
End: 2024-08-19

## 2024-08-19 DIAGNOSIS — I51.7 LEFT VENTRICULAR HYPERTROPHY: ICD-10-CM

## 2024-08-19 DIAGNOSIS — S72.142D CLOSED INTERTROCHANTERIC FRACTURE OF LEFT FEMUR WITH ROUTINE HEALING, SUBSEQUENT ENCOUNTER: Primary | ICD-10-CM

## 2024-08-19 DIAGNOSIS — R60.0 LOCALIZED EDEMA: ICD-10-CM

## 2024-08-19 DIAGNOSIS — S72.142D CLOSED INTERTROCHANTERIC FRACTURE OF LEFT FEMUR WITH ROUTINE HEALING, SUBSEQUENT ENCOUNTER: ICD-10-CM

## 2024-08-19 DIAGNOSIS — R59.1 LYMPHADENOPATHY: Primary | ICD-10-CM

## 2024-08-19 DIAGNOSIS — R11.0 NAUSEA: ICD-10-CM

## 2024-08-19 DIAGNOSIS — I44.69: ICD-10-CM

## 2024-08-19 DIAGNOSIS — R10.11 RIGHT UPPER QUADRANT ABDOMINAL PAIN: ICD-10-CM

## 2024-08-19 LAB
ALBUMIN SERPL BCG-MCNC: 3 G/DL (ref 3.5–5.2)
ALP SERPL-CCNC: 149 U/L (ref 40–150)
ALT SERPL W P-5'-P-CCNC: 22 U/L (ref 0–70)
ANION GAP SERPL CALCULATED.3IONS-SCNC: 10 MMOL/L (ref 7–15)
AST SERPL W P-5'-P-CCNC: 32 U/L (ref 0–45)
BASOPHILS # BLD AUTO: ABNORMAL 10*3/UL
BASOPHILS # BLD MANUAL: 0 10E3/UL (ref 0–0.2)
BASOPHILS NFR BLD AUTO: ABNORMAL %
BASOPHILS NFR BLD MANUAL: 0 %
BILIRUB SERPL-MCNC: 1.3 MG/DL
BUN SERPL-MCNC: 23.4 MG/DL (ref 8–23)
CALCIUM SERPL-MCNC: 8.3 MG/DL (ref 8.8–10.4)
CHLORIDE SERPL-SCNC: 108 MMOL/L (ref 98–107)
CREAT SERPL-MCNC: 0.91 MG/DL (ref 0.67–1.17)
D DIMER PPP FEU-MCNC: 9.24 UG/ML FEU (ref 0–0.5)
EGFRCR SERPLBLD CKD-EPI 2021: 89 ML/MIN/1.73M2
EOSINOPHIL # BLD AUTO: ABNORMAL 10*3/UL
EOSINOPHIL # BLD MANUAL: 0 10E3/UL (ref 0–0.7)
EOSINOPHIL NFR BLD AUTO: ABNORMAL %
EOSINOPHIL NFR BLD MANUAL: 0 %
ERYTHROCYTE [DISTWIDTH] IN BLOOD BY AUTOMATED COUNT: 15.1 % (ref 10–15)
GLUCOSE SERPL-MCNC: 108 MG/DL (ref 70–99)
HCO3 SERPL-SCNC: 22 MMOL/L (ref 22–29)
HCT VFR BLD AUTO: 31.4 % (ref 40–53)
HGB BLD-MCNC: 9.7 G/DL (ref 13.3–17.7)
IMM GRANULOCYTES # BLD: ABNORMAL 10*3/UL
IMM GRANULOCYTES NFR BLD: ABNORMAL %
INR PPP: 1.32 (ref 0.85–1.15)
LIPASE SERPL-CCNC: 23 U/L (ref 13–60)
LYMPHOCYTES # BLD AUTO: ABNORMAL 10*3/UL
LYMPHOCYTES # BLD MANUAL: 0.3 10E3/UL (ref 0.8–5.3)
LYMPHOCYTES NFR BLD AUTO: ABNORMAL %
LYMPHOCYTES NFR BLD MANUAL: 11 %
MCH RBC QN AUTO: 29.6 PG (ref 26.5–33)
MCHC RBC AUTO-ENTMCNC: 30.9 G/DL (ref 31.5–36.5)
MCV RBC AUTO: 96 FL (ref 78–100)
MONOCYTES # BLD AUTO: ABNORMAL 10*3/UL
MONOCYTES # BLD MANUAL: 0 10E3/UL (ref 0–1.3)
MONOCYTES NFR BLD AUTO: ABNORMAL %
MONOCYTES NFR BLD MANUAL: 0 %
NEUTROPHILS # BLD AUTO: ABNORMAL 10*3/UL
NEUTROPHILS # BLD MANUAL: 2.4 10E3/UL (ref 1.6–8.3)
NEUTROPHILS NFR BLD AUTO: ABNORMAL %
NEUTROPHILS NFR BLD MANUAL: 89 %
NRBC # BLD AUTO: 0 10E3/UL
NRBC BLD AUTO-RTO: 0 /100
PLAT MORPH BLD: ABNORMAL
PLATELET # BLD AUTO: 46 10E3/UL (ref 150–450)
POTASSIUM SERPL-SCNC: 4.2 MMOL/L (ref 3.4–5.3)
PROT SERPL-MCNC: 5.6 G/DL (ref 6.4–8.3)
RBC # BLD AUTO: 3.28 10E6/UL (ref 4.4–5.9)
RBC MORPH BLD: ABNORMAL
SODIUM SERPL-SCNC: 140 MMOL/L (ref 135–145)
TROPONIN T SERPL HS-MCNC: 51 NG/L
TROPONIN T SERPL HS-MCNC: 57 NG/L
WBC # BLD AUTO: 2.7 10E3/UL (ref 4–11)

## 2024-08-19 PROCEDURE — 250N000011 HC RX IP 250 OP 636

## 2024-08-19 PROCEDURE — 71275 CT ANGIOGRAPHY CHEST: CPT | Mod: 26 | Performed by: RADIOLOGY

## 2024-08-19 PROCEDURE — 76705 ECHO EXAM OF ABDOMEN: CPT

## 2024-08-19 PROCEDURE — 85610 PROTHROMBIN TIME: CPT | Performed by: EMERGENCY MEDICINE

## 2024-08-19 PROCEDURE — 99214 OFFICE O/P EST MOD 30 MIN: CPT | Performed by: ORTHOPAEDIC SURGERY

## 2024-08-19 PROCEDURE — 99285 EMERGENCY DEPT VISIT HI MDM: CPT | Mod: 25 | Performed by: EMERGENCY MEDICINE

## 2024-08-19 PROCEDURE — 83690 ASSAY OF LIPASE: CPT | Performed by: EMERGENCY MEDICINE

## 2024-08-19 PROCEDURE — 85027 COMPLETE CBC AUTOMATED: CPT | Performed by: EMERGENCY MEDICINE

## 2024-08-19 PROCEDURE — 85007 BL SMEAR W/DIFF WBC COUNT: CPT | Performed by: EMERGENCY MEDICINE

## 2024-08-19 PROCEDURE — 85379 FIBRIN DEGRADATION QUANT: CPT | Performed by: EMERGENCY MEDICINE

## 2024-08-19 PROCEDURE — 93010 ELECTROCARDIOGRAM REPORT: CPT | Performed by: EMERGENCY MEDICINE

## 2024-08-19 PROCEDURE — 96374 THER/PROPH/DIAG INJ IV PUSH: CPT | Performed by: EMERGENCY MEDICINE

## 2024-08-19 PROCEDURE — 93005 ELECTROCARDIOGRAM TRACING: CPT | Performed by: EMERGENCY MEDICINE

## 2024-08-19 PROCEDURE — 84484 ASSAY OF TROPONIN QUANT: CPT | Performed by: EMERGENCY MEDICINE

## 2024-08-19 PROCEDURE — 80053 COMPREHEN METABOLIC PANEL: CPT | Performed by: EMERGENCY MEDICINE

## 2024-08-19 PROCEDURE — 71275 CT ANGIOGRAPHY CHEST: CPT

## 2024-08-19 PROCEDURE — 36415 COLL VENOUS BLD VENIPUNCTURE: CPT | Performed by: EMERGENCY MEDICINE

## 2024-08-19 PROCEDURE — 82248 BILIRUBIN DIRECT: CPT

## 2024-08-19 PROCEDURE — 250N000013 HC RX MED GY IP 250 OP 250 PS 637: Performed by: EMERGENCY MEDICINE

## 2024-08-19 PROCEDURE — 73502 X-RAY EXAM HIP UNI 2-3 VIEWS: CPT | Mod: LT | Performed by: RADIOLOGY

## 2024-08-19 PROCEDURE — 71046 X-RAY EXAM CHEST 2 VIEWS: CPT | Mod: 26 | Performed by: RADIOLOGY

## 2024-08-19 PROCEDURE — 99285 EMERGENCY DEPT VISIT HI MDM: CPT | Performed by: EMERGENCY MEDICINE

## 2024-08-19 PROCEDURE — 71046 X-RAY EXAM CHEST 2 VIEWS: CPT

## 2024-08-19 PROCEDURE — 76705 ECHO EXAM OF ABDOMEN: CPT | Mod: 26 | Performed by: RADIOLOGY

## 2024-08-19 RX ORDER — OXYCODONE HYDROCHLORIDE 5 MG/1
5 TABLET ORAL ONCE
Status: COMPLETED | OUTPATIENT
Start: 2024-08-19 | End: 2024-08-19

## 2024-08-19 RX ORDER — IOPAMIDOL 755 MG/ML
73 INJECTION, SOLUTION INTRAVASCULAR ONCE
Status: COMPLETED | OUTPATIENT
Start: 2024-08-19 | End: 2024-08-19

## 2024-08-19 RX ORDER — NITROGLYCERIN 0.4 MG/1
0.4 TABLET SUBLINGUAL EVERY 5 MIN PRN
Status: DISCONTINUED | OUTPATIENT
Start: 2024-08-19 | End: 2024-08-21 | Stop reason: HOSPADM

## 2024-08-19 RX ADMIN — OXYCODONE HYDROCHLORIDE 5 MG: 5 TABLET ORAL at 21:21

## 2024-08-19 RX ADMIN — NITROGLYCERIN 0.4 MG: 0.4 TABLET SUBLINGUAL at 19:03

## 2024-08-19 RX ADMIN — IOPAMIDOL 73 ML: 755 INJECTION, SOLUTION INTRAVENOUS at 21:45

## 2024-08-19 ASSESSMENT — ACTIVITIES OF DAILY LIVING (ADL)
ADLS_ACUITY_SCORE: 39
ADLS_ACUITY_SCORE: 37
ADLS_ACUITY_SCORE: 39

## 2024-08-19 ASSESSMENT — COLUMBIA-SUICIDE SEVERITY RATING SCALE - C-SSRS
2. HAVE YOU ACTUALLY HAD ANY THOUGHTS OF KILLING YOURSELF IN THE PAST MONTH?: NO
6. HAVE YOU EVER DONE ANYTHING, STARTED TO DO ANYTHING, OR PREPARED TO DO ANYTHING TO END YOUR LIFE?: NO
1. IN THE PAST MONTH, HAVE YOU WISHED YOU WERE DEAD OR WISHED YOU COULD GO TO SLEEP AND NOT WAKE UP?: NO

## 2024-08-19 NOTE — LETTER
8/19/2024      Gucci Logan  [address] [facility] [address]      Dear Colleague,    Thank you for referring your patient, Gucci Logan, to the [facility] [place]. Please see a copy of my visit note below.        [facility], Orthopaedic Oncology Surgery Consultation  by Colten Laguna M.D.    Gucci Logan MRN# [id number]    YOB: 1950     Requesting physician: No ref. provider found  Richi Jaramillo            Assessment and Plan:   Assessment:  Pathologic fracture left femur secondary to metastatic adenocarcinoma  Status post short IM nail fixation.  Progressive osteolysis with impending loss of fixation.      Plan:  Maintain toe-touch weightbearing left lower extremity.  Follow-up in 3 months with repeat radiographs.  Follow-up with medical oncologist regarding management of his metastatic disease which appears to be progressive.  Patient was having chest pain during clinic visit and underwent rapid response evaluation.  Recommended patient go to the emergency room for further evaluation given his prior history of coronary artery disease.  Vital signs stable.  Aspirin administered.  Patient inquired about following up with regards to his right knee surgery recently performed at [facility].  Reports that he does not want to return to his original surgeon in [place].  Therefore I advised that we would obtain his records and have patient evaluated for this issue at another appointment once his cardiac evaluation is completed.      MD Anthony Mendoza Family Professor  Oncology and Adult Reconstructive Surgery  Dept Orthopaedic Surgery, [facility]  [phone] office, [phone] pager  [email or web address]    This note was created using dictation software and may contain errors.  Please contact the creator for any clarifications that are needed.            History of Present Illness:   74 year old  male  chief complaint    This patient is seen for evaluation of his left hip joint.  Previous operative fixation performed at time of fracture in May 2024 by Dr. Gonzalez at the [facility] facility.  Patient has had progressive discomfort and currently is residing in a wheelchair.  He previous was told that he may begin to initiate weightbearing.    During the clinic visit, patient complained of chest discomfort and rapid response evaluation was undertaken.  Further history and evaluation of his hip and right knee was discontinued for this reason.    Background history:  DX:  Prostate carcinoma  Status post left hip pathologic fracture  Status full extremity radiation  History of diabetes  History of coronary artery disease    TREATMENTS:  2/15/2008, Left knee arthroscopy with partial medial meniscectomy and minor shaving of the patella, AGUSTINA Jimenez), [facility]  5/28/2008, Liver biopsy, (PARUL Melo), [facility]  5/20/2024, open reduction internal fixation hip nailing left, (Kristy), [facility]   8/6/2024, Right knee I&D, (BETTINA Quezada), [facility]   date, treatment, (surgeon), hospital           Physical Exam:     EXAMINATION pertinent findings:   PSYCH: Pleasant, healthy-appearing, alert, oriented x3, cooperative. Normal mood and affect.  VITAL SIGNS: There were no vitals taken for this visit..  Reviewed nursing intake notes.   There is no height or weight on file to calculate BMI.  RESP: non labored breathing   ABD: benign, soft, non-tender, no acute peritoneal findings  SKIN: grossly normal   LYMPHATIC: grossly normal, no adenopathy, no extremity edema  NEURO: grossly normal , no motor deficits  VASCULAR: satisfactory perfusion of all extremities   MUSCULOSKELETAL:   Left hip incision well-healed.  Patient residing in wheelchair.  No pain with motion of the hip or rotation of the femur.  Further examination and weightbearing not assessed given ongoing chest pain.            Data:   All laboratory data reviewed  All imaging studies reviewed by me          DATA for DOCUMENTATION:         Past Medical History:     Patient Active Problem List   Diagnosis     Hypertension goal BP (blood pressure) < 140/90     Obesity, unspecified obesity severity, unspecified obesity type     Gastroesophageal reflux disease, esophagitis presence not specified     Hyperlipidemia LDL goal <100     Impaired fasting glucose     Diabetes mellitus, type 2 (H)     Non-insulin dependent type 2 diabetes mellitus (H)     Abdominal pain     Portal vein thrombosis     Vitamin D deficiency     Thrombocytopenia (H24)     Splenomegaly     Osteoarthrosis     SHONDA (obstructive sleep apnea)     Lymphadenopathy     Left ventricular hypertrophy     Jaundice     Chronic hepatitis C virus infection (H)     Compression fracture of T6 vertebra with routine healing     Syncope, unspecified syncope type     Fall     Compression fracture of T5 vertebra with routine healing, subsequent encounter     Diabetes mellitus without complication (H)     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)     Stage 3 chronic kidney disease, unspecified whether stage 3a or 3b CKD (H)     Glaucoma suspect of both eyes     Fall from standing, initial encounter     Intertrochanteric fracture of left femur, closed, initial encounter (H)     Chronic nonalcoholic liver disease     Fall on same level from slipping, tripping or stumbling, initial encounter     Diverticular disease of colon     Chronic hepatitis C (H)     Cirrhosis of liver (H)     Class 2 severe obesity due to excess calories with serious comorbidity in adult (H)     Metastasis to bone (H)     Prostate cancer (H)     Malignant neoplasm metastatic to lymph nodes of multiple sites (H)     Prostate cancer metastatic to bone (H)     Encounter for antineoplastic chemotherapy     Past Medical History:   Diagnosis Date     Arthritis       Chronic, continuous use of opioids      Esophageal reflux 03/01/2014     Hearing problem      Hiatal hernia      Hypertension      Jaundice 05/31/2008     Liver disease      Obesity 01/24/2013     Obstructive sleep apnea      Sleep apnea     Advised CPAP machine. Not keen to use it.      Syncope, unspecified syncope type 2/20/2023       Also see scanned health assessment forms.       Past Surgical History:     Past Surgical History:   Procedure Laterality Date     ARTHROSCOPY KNEE RT/LT      (L) with partial medial meniscectomy     CATARACT IOL, RT/LT  Nov and Dec 2017     COLONOSCOPY N/A 4/24/2019    Procedure: COLONOSCOPY, WITH POLYPECTOMY AND BIOPSY;  Surgeon: Leventhal, Thomas Michael, MD;  [facility]: [facility]     COLONOSCOPY N/A 9/14/2022    Procedure: COLONOSCOPY;  Surgeon: Rafa Renee MD;  Location: [facility]     DACRYOCYSTORHINOSTOMY Left 10/16/2018    Procedure: DACRYOCYSTORHINOSTOMY;  Surgeon: Madhu Krause MD;  Location: [facility]     ENDOSCOPIC ENDONASAL SURGERY  1994     ENDOSCOPY  2-19-15     ESOPHAGOSCOPY, GASTROSCOPY, DUODENOSCOPY (EGD), COMBINED N/A 4/24/2019    Procedure: COMBINED ESOPHAGOSCOPY, GASTROSCOPY, DUODENOSCOPY (EGD) - hold aspirin ibuprofen or naproxen for one week prior (per physician order);  Surgeon: Leventhal, Thomas Michael, MD;  Location: [facility]     ESOPHAGOSCOPY, GASTROSCOPY, DUODENOSCOPY (EGD), COMBINED N/A 5/23/2023    Procedure: Esophagoscopy, gastroscopy, duodenoscopy, combined;  Surgeon: Rafa Renee MD;  Location:  GI     EYE SURGERY       Hernia surgery Left 1994     NASAL/SINUS POLYPECTOMY       OPEN REDUCTION INTERNAL FIXATION HIP NAILING Left 5/20/2024    Procedure: open reduction internal fixation hip nailing left;  Surgeon: Rafa Wagner MD;  Location: [facility]     REPAIR PTOSIS Left 10/16/2018    Procedure: LEFT UPPER LID PTOSIS AND BILATERAL  BROW PTOSIS REPAIR WITH LEFT DACRYOCYSTORHINOSTOMY ;  Surgeon: Madhu Krause MD;  Location: [facility]     REPAIR  PTOSIS BROW Bilateral 10/16/2018    Procedure: REPAIR PTOSIS BROW;  Surgeon: Madhu Krause MD;  Location: [facility]     ROTATOR CUFF REPAIR RT/LT Right      ZZC OPEN RX ANKLE DISLOCATN+FIXATN      (R)     ZZC SPINAL FUSION,ANT,EA ADNL LEVEL      T12 - L1            Social History:     Social History     Socioeconomic History     Marital status: Single     Spouse name: Not on file     Number of children: 0     Years of education: 18     Highest education level: Not on file   Occupational History     Occupation: Contractor     Employer: [employer]     Comment: Not working now   Tobacco Use     Smoking status: Former     Current packs/day: 0.00     Types: Cigarettes     Start date: 1990     Quit date: 1999     Years since quittin.6     Passive exposure: Never     Smokeless tobacco: Never   Vaping Use     Vaping status: Never Used   Substance and Sexual Activity     Alcohol use: Yes     Comment: rare     Drug use: No     Sexual activity: Not Currently     Partners: Female   Other Topics Concern     Parent/sibling w/ CABG, MI or angioplasty before 65F 55M? No   Social History Narrative     Not on file     Social Determinants of Health     Financial Resource Strain: Low Risk  (2024)    Received from [facility]    Financial Resource Strain      Difficulty of Paying Living Expenses: 3      Difficulty of Paying Living Expenses: Not on file   Food Insecurity: No Food Insecurity (2024)    Received from [facility]    Food Insecurity      Worried About Running Out of Food in the Last Year: 1   Transportation Needs: No Transportation Needs (2024)    Received from [facility]    Transportation Needs      Lack of Transportation (Medical): 1   Physical Activity: Not on file   Stress: Not on file   Social Connections: Socially Integrated (2024)    Received from [facility]     Social Connections      Frequency of Communication with Friends and Family: 0   Interpersonal Safety: Low Risk  (4/30/2024)    Interpersonal Safety      Do you feel physically and emotionally safe where you currently live?: Yes      Within the past 12 months, have you been hit, slapped, kicked or otherwise physically hurt by someone?: No      Within the past 12 months, have you been humiliated or emotionally abused in other ways by your partner or ex-partner?: No   Housing Stability: Low Risk  (8/6/2024)    [facility] from [facility]    Housing Stability      Unable to Pay for Housing in the Last Year: 1            Family History:       Family History   Problem Relation Age of Onset     Alzheimer Disease Mother 85     Dementia Mother      Cerebrovascular Disease Father 50     Cancer Father      Hypertension Father      Neurologic Disorder No family hx of      Diabetes No family hx of             Medications:     Current Outpatient Medications   Medication Sig Dispense Refill     acetaminophen (TYLENOL) 325 MG tablet Take 2 tablets (650 mg) by mouth every 4 hours as needed for other (For optimal non-opioid multimodal pain management to improve pain control.) 100 tablet 0     aspirin 81 MG EC tablet Take 1 tablet (81 mg) by mouth daily 90 tablet 3     atorvastatin (LIPITOR) 10 MG tablet Take 1 tablet (10 mg) by mouth daily 90 tablet 1     calcium citrate (CITRACAL) 950 (200 Ca) MG tablet Take 1 tablet (950 mg) by mouth daily 120 tablet 3     [START ON 8/14/2024] darolutamide (NUBEQA) 300 MG tablet Take 2 tablets (600 mg) by mouth 2 times daily for 21 days . Swallow tablets whole. Take with food. Avoid grapefruit or grapefruit juice. 84 tablet 0     [START ON 8/13/2024] dexAMETHasone (DECADRON) 4 MG tablet Take 2 tablets (8 mg) by mouth 2 times daily (with meals) for 3 days Start evening of Docetaxel infusion and continue for a total of 3 doses.  Repeat with each cycle of chemotherapy.  72 tablet 0     diclofenac (VOLTAREN) 1 % topical gel Apply 4 g topically 3 times daily as needed for moderate pain (bursitis) 150 g 1     lidocaine-prilocaine (EMLA) 2.5-2.5 % external cream Apply to Port-A-Cath site 1 hour prior to venous access. 30 g 3     metFORMIN (GLUCOPHAGE) 500 MG tablet Take 1 tablet (500 mg) by mouth 2 times daily (with meals) (Patient taking differently: Take 1,000 mg by mouth daily (with breakfast)) 180 tablet 1     metoprolol succinate ER (TOPROL XL) 100 MG 24 hr tablet 1 1/2 ( 150 mg ) po daily       multivitamin w/minerals (THERA-VIT-M) tablet Take 1 tablet by mouth daily       neomycin-polymixin-dexAMETHasone (MAXITROL) 0.1 % ophthalmic suspension 1 drop to left eye every 6 hours x1 week. 10 mL 0     omeprazole (PRILOSEC) 20 MG DR capsule TAKE 1 CAPSULE BY MOUTH ONCE DAILY 30 TO 60 MINUTES BEFORE A MEAL 90 capsule 1     ondansetron (ZOFRAN) 8 MG tablet Take 1 tablet (8 mg) by mouth every 8 hours as needed for nausea 60 tablet 5     oxyCODONE (ROXICODONE) 5 MG tablet Take 1-2 tablets (5-10 mg) by mouth every 4 hours as needed for pain 70 tablet 0     predniSONE (DELTASONE) 5 MG tablet Take 1 tablet (5 mg) by mouth daily (with breakfast) Do not take prednisone on days when taking dexamethasone. 60 tablet 1     [START ON 8/13/2024] prochlorperazine (COMPAZINE) 10 MG tablet Take 0.5 tablets (5 mg) by mouth every 6 hours as needed for nausea or vomiting 60 tablet 5     senna-docusate (SENOKOT-S/PERICOLACE) 8.6-50 MG tablet Take 1 tablet by mouth 2 times daily as needed for constipation 30 tablet 1     tiZANidine (ZANAFLEX) 4 MG tablet TAKE 1/2 (ONE-HALF) TO 1  TABLET BY MOUTH UP TO THREE TIMES DAILY AS NEEDED (Patient taking differently: Take 2-4 mg by mouth 3 times daily as needed for muscle spasms Usually takes at 4pm with tramadol) 30 tablet 0     valsartan (DIOVAN) 160 MG tablet Take 1 tablet (160 mg) by mouth daily 90 tablet 1     vitamin D3 (CHOLECALCIFEROL) 50 mcg (2000 units)  tablet Take 1 tablet (50 mcg) by mouth daily 120 tablet 3     No current facility-administered medications for this visit.              Review of Systems:   A comprehensive 10 point review of systems (constitutional, ENT, cardiac, peripheral vascular, lymphatic, respiratory, GI, , Musculoskeletal, skin, Neurological) was performed and found to be negative except as described in this note.     See intake form completed by patient      Again, thank you for allowing me to participate in the care of your patient.        Sincerely,        Colten Laguna MD

## 2024-08-19 NOTE — ED PROVIDER NOTES
ED Provider Note  [facility]      History     Chief Complaint   Patient presents with    Chest Pain     HPI  Gucci Logan is a 74 year old male who with a past medical history of type 2 diabetes, hypertension, portal vein thrombosis, liver cirrhosis, prostate cancer metastatic to bone presenting with chest pain on the right side in the sternum.  Describes some pressure.  No vomiting but had some nausea, thinks a little sweating.  No shortness of breath.  No leg pain or swelling.  No pleuritic pain.  Denies falls or injuries.  No dizziness or syncope.  No numbness tingling weakness or visual changes. The patient received nitroglycerin tab and 324 asa prior to arrival. nitroglycerin did help his pain. No exertional worsening. No change in pain with change of position.    Past Medical History  Past Medical History:   Diagnosis Date    Arthritis     Chronic, continuous use of opioids     Esophageal reflux 03/01/2014    Hearing problem     Hiatal hernia     Hypertension     Jaundice 05/31/2008    Liver disease     Obesity 01/24/2013    Obstructive sleep apnea     Sleep apnea     Advised CPAP machine. Not keen to use it.     Syncope, unspecified syncope type 2/20/2023     Past Surgical History:   Procedure Laterality Date    ARTHROSCOPY KNEE RT/LT      (L) with partial medial meniscectomy    CATARACT IOL, RT/LT  Nov and Dec 2017    COLONOSCOPY N/A 4/24/2019    Procedure: COLONOSCOPY, WITH POLYPECTOMY AND BIOPSY;  Surgeon: Leventhal, Thomas Michael, MD;  Location: [facility]    COLONOSCOPY N/A 9/14/2022    Procedure: COLONOSCOPY;  Surgeon: Rafa Renee MD;  Location:  GI    DACRYOCYSTORHINOSTOMY Left 10/16/2018    Procedure: DACRYOCYSTORHINOSTOMY;  Surgeon: Madhu Krause MD;  Location: [facility]    ENDOSCOPIC ENDONASAL SURGERY  1994    ENDOSCOPY  2-19-15    ESOPHAGOSCOPY, GASTROSCOPY, DUODENOSCOPY (EGD), COMBINED N/A 4/24/2019    Procedure: COMBINED ESOPHAGOSCOPY, GASTROSCOPY,  DUODENOSCOPY (EGD) - hold aspirin ibuprofen or naproxen for one week prior (per physician order);  Surgeon: Leventhal, Thomas Michael, MD;  Location: [facility] OR    ESOPHAGOSCOPY, GASTROSCOPY, DUODENOSCOPY (EGD), COMBINED N/A 5/23/2023    Procedure: Esophagoscopy, gastroscopy, duodenoscopy, combined;  Surgeon: Rafa Renee MD;  Location: [facility]    EYE SURGERY      Hernia surgery Left 1994    NASAL/SINUS POLYPECTOMY      OPEN REDUCTION INTERNAL FIXATION HIP NAILING Left 5/20/2024    Procedure: open reduction internal fixation hip nailing left;  Surgeon: Rafa Wagner MD;  Location: [facility] OR    REPAIR PTOSIS Left 10/16/2018    Procedure: LEFT UPPER LID PTOSIS AND BILATERAL  BROW PTOSIS REPAIR WITH LEFT DACRYOCYSTORHINOSTOMY ;  Surgeon: Madhu Krause MD;  Location:  [facility]    REPAIR PTOSIS BROW Bilateral 10/16/2018    Procedure: REPAIR PTOSIS BROW;  Surgeon: Madhu Krause MD;  Location: [facility]    ROTATOR CUFF REPAIR RT/LT Right     ZZC OPEN RX ANKLE DISLOCATN+FIXATN      (R)    ZZC SPINAL FUSION,ANT,EA ADNL LEVEL      T12 - L1     acetaminophen (TYLENOL) 325 MG tablet  aspirin 81 MG EC tablet  calcium citrate (CITRACAL) 950 (200 Ca) MG tablet  darolutamide (NUBEQA) 300 MG tablet  dexAMETHasone (DECADRON) 4 MG tablet  diclofenac (VOLTAREN) 1 % topical gel  lidocaine-prilocaine (EMLA) 2.5-2.5 % external cream  metFORMIN (GLUCOPHAGE) 500 MG tablet  metoprolol succinate ER (TOPROL XL) 100 MG 24 hr tablet  multivitamin w/minerals (THERA-VIT-M) tablet  neomycin-polymixin-dexAMETHasone (MAXITROL) 0.1 % ophthalmic suspension  omeprazole (PRILOSEC) 20 MG DR capsule  ondansetron (ZOFRAN) 8 MG tablet  oxyCODONE (ROXICODONE) 5 MG tablet  predniSONE (DELTASONE) 5 MG tablet  prochlorperazine (COMPAZINE) 10 MG tablet  senna-docusate (SENOKOT-S/PERICOLACE) 8.6-50 MG tablet  tiZANidine (ZANAFLEX) 4 MG tablet  tiZANidine (ZANAFLEX) 4 MG tablet  valsartan (DIOVAN) 160 MG tablet  vitamin D3 (CHOLECALCIFEROL) 50 mcg (2000 units)  "tablet      Allergies   Allergen Reactions    Bee Venom Swelling     Gum swelling    Haemophilus B Polysaccharide Vaccine Other (See Comments)     PN: delirium  fever    Haemophilus Influenzae Nausea and Other (See Comments)     PN: delirium  fever    Other Reaction(s): Fever    PN: delirium  fever   PN: delirium  fever    Pneumococcal Vaccine      Other Reaction(s): *Unknown, adverse reaction     Family History  Family History   Problem Relation Age of Onset    Alzheimer Disease Mother 85    Dementia Mother     Cerebrovascular Disease Father 50    Cancer Father     Hypertension Father     Neurologic Disorder No family hx of     Diabetes No family hx of      Social History   Social History     Tobacco Use    Smoking status: Former     Current packs/day: 0.00     Types: Cigarettes     Start date: 1990     Quit date: 1999     Years since quittin.6     Passive exposure: Never    Smokeless tobacco: Never   Vaping Use    Vaping status: Never Used   Substance Use Topics    Alcohol use: Yes     Comment: rare    Drug use: No      A medically appropriate review of systems was performed with pertinent positives and negatives noted in the HPI, and all other systems negative.    Physical Exam   Height: 177.8 cm (5' 10\")  Weight: 104.8 kg (231 lb)  Physical Exam  Physical Exam   Constitutional: oriented to person, place, and time. appears well-developed and well-nourished.   HENT:   Head: Normocephalic and atraumatic.   Neck: Normal range of motion.   Pulmonary/Chest: Effort normal. No respiratory distress.   Cardiac: No murmurs, rubs, gallops. RRR. No LE edema.  Abdominal: Abdomen soft, epigastric/ruq mild pain to palpation, nondistended. No rebound tenderness.  MSK: Long bones without deformity or evidence of trauma  Neurological: alert and oriented to person, place, and time.   Skin: Skin is warm and dry.   Psychiatric:  normal mood and affect.  behavior is normal. Thought content normal.       ED Course, " Procedures, & Data      Procedures            EKG Interpretation:      Interpreted by Jean-Claude Nair MD  Time reviewed: 1725  Symptoms at time of EKG: chest pain   Rhythm: normal sinus   Rate: Normal  Axis: Left Axis Deviation  Ectopy: none  Conduction: left anterior fasciclar block  ST Segments/ T Waves: No acute ischemic changes  Q Waves: nonspecific  Comparison to prior: Largely unchanged from prior EKG, left anterior fascicular block now present    Clinical Impression: Left anterior fascicular block without any obvious ischemic changes.                  Results for orders placed or performed during the hospital encounter of 08/19/24   EKG 12-lead, tracing only     Status: None (Preliminary result)   Result Value Ref Range    Systolic Blood Pressure  mmHg    Diastolic Blood Pressure  mmHg    Ventricular Rate 75 BPM    Atrial Rate 75 BPM    AR Interval 154 ms    QRS Duration 96 ms     ms    QTc 471 ms    P Axis 53 degrees    R AXIS -54 degrees    T Axis 95 degrees    Interpretation ECG       Sinus rhythm  Left anterior fascicular block  Cannot rule out Inferior infarct , age undetermined  Cannot rule out Anterior infarct , age undetermined  Abnormal ECG     CBC with platelets differential     Status: None (In process)    Narrative    The following orders were created for panel order CBC with platelets differential.  Procedure                               Abnormality         Status                     ---------                               -----------         ------                     CBC with platelets and d...[[id number]]                      In process                   Please view results for these tests on the individual orders.   Results for orders placed or performed in visit on 08/19/24   XR Pelvis and Hip Left 1 View     Status: None    Narrative    Exam: Single frontal view of the pelvis and AP and frog-leg lateral  view of the left hip dated 8/19/2024.    COMPARISON: 7/8/2024.    CLINICAL  HISTORY: Intertrochanteric fracture.    FINDINGS: Single frontal view of the pelvis and AP and frog-leg  lateral view of the left hip was obtained. Joint space narrowing in  both hip joints. Postsurgical changes of femoral screw and short  intramedullary oliver in the left femur from known intertrochanteric  fracture. Surgical hardware appears intact. No femoral head collapse.      Impression    IMPRESSION: Stable postsurgical changes of placement of the femoral  screw and short intramedullary oliver into the left hip for a known  intertrochanteric fracture. No hardware complication.    GEORGIA WHITE MD         SYSTEM ID:  [id number]     Medications   nitroGLYcerin (NITROSTAT) sublingual tablet 0.4 mg (has no administration in time range)     Labs Ordered and Resulted from Time of ED Arrival to Time of ED Departure - No data to display  XR Chest 2 Views    (Results Pending)   US Abdomen Limited (RUQ)    (Results Pending)          Critical care was not performed.     Medical Decision Making  The patient's presentation was of high complexity (an acute health issue posing potential threat to life or bodily function).    The patient's evaluation involved:  ordering and/or review of 3+ test(s) in this encounter (see separate area of note for details)  discussion of management or test interpretation with another health professional (surgery team)    The patient's management necessitated moderate risk (IV contrast administration) and high risk (a decision regarding hospitalization).    Assessment & Plan    MDM  Patient here with chest pain or questionable right upper quadrant pain.  ACS less likely with a nonischemic EKG and delta troponins that are not significantly rising.  Very low suspicion for PE with a normal CT.  He does seem to have right upper quadrant pain, ultrasound fairly equivocal with some gallbladder wall thickening.  Due to persistent pain I did have surgery see the patient.  They do not think he has an acute  cholecystitis at this point but would like him to have a HIDA scan.  I do feel he should have potentially provocative testing to rule out cardiac etiology as well.  Will plan to admit to medicine for these tests.    I have reviewed the nursing notes. I have reviewed the findings, diagnosis, plan and need for follow up with the patient.    New Prescriptions    No medications on file       Final diagnoses:   Right upper quadrant abdominal pain       Jean-Claude Nair  [facility] EMERGENCY DEPARTMENT  8/19/2024     Jean-Claude Nair MD  08/20/24 0028

## 2024-08-19 NOTE — NURSING NOTE
Rapid Response Documentation     Date:   8/19  Time Start: 3:05  Time End: 3:34 9911 called       Vitals    Heart Rate: 80`    BP:  152/74    SpO2:  97%      Situation    Present to clinic with 8-9/10 chest pain below ribs. Rapid called at 3:05pm. EKG done, updated by rapid team and asked to call 911 and update 888. This was completed.

## 2024-08-19 NOTE — PROGRESS NOTES
"Rapid Response Epic Documentation     Situation:   RRT requested to 4h floor, 4.43     Objective:  Upon arrival, RRT found 75 y/o male sitting in wheelchair and speak with staff.   Staff introduced RRT to pt, who stated, \"I am having chest pn\" and placed he fist over the center of his chest.   Pt rated pn 9/10 and stated he began during his ride from home to his appointment.   Pt stated pan radiated under his left and right breast and described pn as squeezing.   Pt denied increased pn with breathing or palpation.         Assessment:        Primary assessment: airway-open; breathing-normal/non/labored; circulation-strong/regular; skin-warm/pale/dry; PERRLA; A&O x 4; CMS x 4.  Pt denied following: headache/lightheadedness/dizziness; neck pn; SOB; abd pn; back pn.      RRT requested 12 lead and pt agreed.   Initial 12 lead showed NSR with interpretation of anterior infarct, but no ST elevation was noted.  Due to continued chest pn, RRT requested to give pt nitroglycerin/ASA.  Pt reported decrease in chest pn and rated at 6/10.   Pt was initially reluctant for evaluation/transport at ED, but stated he was concerned.  After continued conversation the pt requested evaluation and transport to [facility].   After approximately 12 minutes the pt reported increased chest pn and was given 1 x 0.4 mg nitroglycerin.  Pt reported decrease in chest pn and rated 6/10.  No further tx due to arrival of HEMS.     BP:  152/74, 158/80, 116/78, 141/78  Pulse: 80, 78  Respiration: 14  SPO2: 98 %  Glucose: NA  Mental Status: Alert  CMS: Intact  Stroke Scale: Negative  EKG: Performed, NSR      Treatment:    Medication: 0.4 Nitroglycerin SL 1530, 0.4 Nitroglycerin   SL 1542  324 mg ASA 1528     Disposition:      Transfer to Emergency Room Via: 911/HEMS    Protocol Used:     Chest Pain        "

## 2024-08-19 NOTE — ED TRIAGE NOTES
BIBA from [facility]. Check up for knee and hip. Bone cx appointment today when chest pain started in waiting room. 12-lead showed NSR.  324 asprin and 2 doses of nitroglycerin.    Elyssa Stanley RN on 8/19/2024 at 4:08 PM

## 2024-08-20 ENCOUNTER — APPOINTMENT (OUTPATIENT)
Dept: NUCLEAR MEDICINE | Facility: CLINIC | Age: 74
DRG: 314 | End: 2024-08-20
Payer: COMMERCIAL

## 2024-08-20 ENCOUNTER — APPOINTMENT (OUTPATIENT)
Dept: CARDIOLOGY | Facility: CLINIC | Age: 74
DRG: 314 | End: 2024-08-20
Payer: COMMERCIAL

## 2024-08-20 PROBLEM — R10.11 RIGHT UPPER QUADRANT ABDOMINAL PAIN: Status: ACTIVE | Noted: 2024-08-20

## 2024-08-20 LAB
ALBUMIN SERPL BCG-MCNC: 2.7 G/DL (ref 3.5–5.2)
ALP SERPL-CCNC: 120 U/L (ref 40–150)
ALT SERPL W P-5'-P-CCNC: 18 U/L (ref 0–70)
ANION GAP SERPL CALCULATED.3IONS-SCNC: 6 MMOL/L (ref 7–15)
AST SERPL W P-5'-P-CCNC: 28 U/L (ref 0–45)
ATRIAL RATE - MUSE: 75 BPM
BASOPHILS # BLD AUTO: ABNORMAL 10*3/UL
BASOPHILS # BLD MANUAL: 0 10E3/UL (ref 0–0.2)
BASOPHILS NFR BLD AUTO: ABNORMAL %
BASOPHILS NFR BLD MANUAL: 2 %
BILIRUB DIRECT SERPL-MCNC: 0.44 MG/DL (ref 0–0.3)
BILIRUB SERPL-MCNC: 1.1 MG/DL
BUN SERPL-MCNC: 19.4 MG/DL (ref 8–23)
CALCIUM SERPL-MCNC: 7.9 MG/DL (ref 8.8–10.4)
CHLORIDE SERPL-SCNC: 109 MMOL/L (ref 98–107)
CREAT SERPL-MCNC: 0.95 MG/DL (ref 0.67–1.17)
DIASTOLIC BLOOD PRESSURE - MUSE: NORMAL MMHG
EGFRCR SERPLBLD CKD-EPI 2021: 85 ML/MIN/1.73M2
ELLIPTOCYTES BLD QL SMEAR: SLIGHT
EOSINOPHIL # BLD AUTO: ABNORMAL 10*3/UL
EOSINOPHIL # BLD MANUAL: 0 10E3/UL (ref 0–0.7)
EOSINOPHIL NFR BLD AUTO: ABNORMAL %
EOSINOPHIL NFR BLD MANUAL: 2 %
ERYTHROCYTE [DISTWIDTH] IN BLOOD BY AUTOMATED COUNT: 15.1 % (ref 10–15)
GLUCOSE BLDC GLUCOMTR-MCNC: 115 MG/DL (ref 70–99)
GLUCOSE SERPL-MCNC: 145 MG/DL (ref 70–99)
HCO3 SERPL-SCNC: 25 MMOL/L (ref 22–29)
HCT VFR BLD AUTO: 27.6 % (ref 40–53)
HGB BLD-MCNC: 8.3 G/DL (ref 13.3–17.7)
IMM GRANULOCYTES # BLD: ABNORMAL 10*3/UL
IMM GRANULOCYTES NFR BLD: ABNORMAL %
INTERPRETATION ECG - MUSE: NORMAL
LVEF ECHO: NORMAL
LYMPHOCYTES # BLD AUTO: ABNORMAL 10*3/UL
LYMPHOCYTES # BLD MANUAL: 0.2 10E3/UL (ref 0.8–5.3)
LYMPHOCYTES NFR BLD AUTO: ABNORMAL %
LYMPHOCYTES NFR BLD MANUAL: 16 %
MAGNESIUM SERPL-MCNC: 1.6 MG/DL (ref 1.7–2.3)
MCH RBC QN AUTO: 29.1 PG (ref 26.5–33)
MCHC RBC AUTO-ENTMCNC: 30.1 G/DL (ref 31.5–36.5)
MCV RBC AUTO: 97 FL (ref 78–100)
METAMYELOCYTES # BLD MANUAL: 0.1 10E3/UL
METAMYELOCYTES NFR BLD MANUAL: 4 %
MONOCYTES # BLD AUTO: ABNORMAL 10*3/UL
MONOCYTES # BLD MANUAL: 0.1 10E3/UL (ref 0–1.3)
MONOCYTES NFR BLD AUTO: ABNORMAL %
MONOCYTES NFR BLD MANUAL: 6 %
MYELOCYTES # BLD MANUAL: 0 10E3/UL
MYELOCYTES NFR BLD MANUAL: 1 %
NEUTROPHILS # BLD AUTO: ABNORMAL 10*3/UL
NEUTROPHILS # BLD MANUAL: 1 10E3/UL (ref 1.6–8.3)
NEUTROPHILS NFR BLD AUTO: ABNORMAL %
NEUTROPHILS NFR BLD MANUAL: 69 %
NRBC # BLD AUTO: 0 10E3/UL
NRBC # BLD AUTO: 0 10E3/UL
NRBC BLD AUTO-RTO: 0 /100
NRBC BLD MANUAL-RTO: 1 %
NT-PROBNP SERPL-MCNC: 4816 PG/ML (ref 0–900)
P AXIS - MUSE: 53 DEGREES
PLAT MORPH BLD: ABNORMAL
PLATELET # BLD AUTO: 41 10E3/UL (ref 150–450)
POTASSIUM SERPL-SCNC: 4.1 MMOL/L (ref 3.4–5.3)
PR INTERVAL - MUSE: 154 MS
PROT SERPL-MCNC: 5 G/DL (ref 6.4–8.3)
QRS DURATION - MUSE: 96 MS
QT - MUSE: 422 MS
QTC - MUSE: 471 MS
R AXIS - MUSE: -54 DEGREES
RBC # BLD AUTO: 2.85 10E6/UL (ref 4.4–5.9)
RBC MORPH BLD: ABNORMAL
SODIUM SERPL-SCNC: 140 MMOL/L (ref 135–145)
SYSTOLIC BLOOD PRESSURE - MUSE: NORMAL MMHG
T AXIS - MUSE: 95 DEGREES
TOXIC GRANULES BLD QL SMEAR: PRESENT
TROPONIN T SERPL HS-MCNC: 63 NG/L
VENTRICULAR RATE- MUSE: 75 BPM
WBC # BLD AUTO: 1.4 10E3/UL (ref 4–11)

## 2024-08-20 PROCEDURE — 120N000002 HC R&B MED SURG/OB UMMC

## 2024-08-20 PROCEDURE — 84484 ASSAY OF TROPONIN QUANT: CPT | Performed by: EMERGENCY MEDICINE

## 2024-08-20 PROCEDURE — 36591 DRAW BLOOD OFF VENOUS DEVICE: CPT | Performed by: EMERGENCY MEDICINE

## 2024-08-20 PROCEDURE — A9537 TC99M MEBROFENIN: HCPCS | Performed by: STUDENT IN AN ORGANIZED HEALTH CARE EDUCATION/TRAINING PROGRAM

## 2024-08-20 PROCEDURE — 250N000013 HC RX MED GY IP 250 OP 250 PS 637: Performed by: EMERGENCY MEDICINE

## 2024-08-20 PROCEDURE — 78227 HEPATOBIL SYST IMAGE W/DRUG: CPT | Mod: 26 | Performed by: RADIOLOGY

## 2024-08-20 PROCEDURE — 250N000013 HC RX MED GY IP 250 OP 250 PS 637: Performed by: STUDENT IN AN ORGANIZED HEALTH CARE EDUCATION/TRAINING PROGRAM

## 2024-08-20 PROCEDURE — 250N000011 HC RX IP 250 OP 636: Performed by: EMERGENCY MEDICINE

## 2024-08-20 PROCEDURE — 82962 GLUCOSE BLOOD TEST: CPT

## 2024-08-20 PROCEDURE — 343N000001 HC RX 343: Performed by: STUDENT IN AN ORGANIZED HEALTH CARE EDUCATION/TRAINING PROGRAM

## 2024-08-20 PROCEDURE — 250N000013 HC RX MED GY IP 250 OP 250 PS 637

## 2024-08-20 PROCEDURE — 83735 ASSAY OF MAGNESIUM: CPT | Performed by: INTERNAL MEDICINE

## 2024-08-20 PROCEDURE — 80053 COMPREHEN METABOLIC PANEL: CPT

## 2024-08-20 PROCEDURE — 99207 PR APP CREDIT; MD BILLING SHARED VISIT: CPT | Mod: GC | Performed by: STUDENT IN AN ORGANIZED HEALTH CARE EDUCATION/TRAINING PROGRAM

## 2024-08-20 PROCEDURE — 93306 TTE W/DOPPLER COMPLETE: CPT | Mod: 26 | Performed by: STUDENT IN AN ORGANIZED HEALTH CARE EDUCATION/TRAINING PROGRAM

## 2024-08-20 PROCEDURE — 99223 1ST HOSP IP/OBS HIGH 75: CPT | Mod: GC | Performed by: INTERNAL MEDICINE

## 2024-08-20 PROCEDURE — 36591 DRAW BLOOD OFF VENOUS DEVICE: CPT

## 2024-08-20 PROCEDURE — 85007 BL SMEAR W/DIFF WBC COUNT: CPT

## 2024-08-20 PROCEDURE — 99222 1ST HOSP IP/OBS MODERATE 55: CPT | Mod: 25 | Performed by: INTERNAL MEDICINE

## 2024-08-20 PROCEDURE — 250N000012 HC RX MED GY IP 250 OP 636 PS 637

## 2024-08-20 PROCEDURE — 250N000011 HC RX IP 250 OP 636

## 2024-08-20 PROCEDURE — 83880 ASSAY OF NATRIURETIC PEPTIDE: CPT

## 2024-08-20 PROCEDURE — 85027 COMPLETE CBC AUTOMATED: CPT

## 2024-08-20 PROCEDURE — 78227 HEPATOBIL SYST IMAGE W/DRUG: CPT

## 2024-08-20 PROCEDURE — 93306 TTE W/DOPPLER COMPLETE: CPT

## 2024-08-20 RX ORDER — AMOXICILLIN 250 MG
1 CAPSULE ORAL 2 TIMES DAILY PRN
Status: DISCONTINUED | OUTPATIENT
Start: 2024-08-20 | End: 2024-08-21 | Stop reason: HOSPADM

## 2024-08-20 RX ORDER — OXYCODONE HYDROCHLORIDE 5 MG/1
5-10 TABLET ORAL EVERY 4 HOURS PRN
Status: DISCONTINUED | OUTPATIENT
Start: 2024-08-20 | End: 2024-08-21 | Stop reason: HOSPADM

## 2024-08-20 RX ORDER — LIDOCAINE 40 MG/G
CREAM TOPICAL
Status: DISCONTINUED | OUTPATIENT
Start: 2024-08-20 | End: 2024-08-21 | Stop reason: HOSPADM

## 2024-08-20 RX ORDER — CALCIUM CARBONATE 500 MG/1
1000 TABLET, CHEWABLE ORAL 4 TIMES DAILY PRN
Status: DISCONTINUED | OUTPATIENT
Start: 2024-08-20 | End: 2024-08-21 | Stop reason: HOSPADM

## 2024-08-20 RX ORDER — METOPROLOL SUCCINATE 50 MG/1
150 TABLET, EXTENDED RELEASE ORAL DAILY
Status: DISCONTINUED | OUTPATIENT
Start: 2024-08-20 | End: 2024-08-21 | Stop reason: HOSPADM

## 2024-08-20 RX ORDER — ASPIRIN 81 MG/1
81 TABLET ORAL DAILY
Status: DISCONTINUED | OUTPATIENT
Start: 2024-08-20 | End: 2024-08-21 | Stop reason: HOSPADM

## 2024-08-20 RX ORDER — MAGNESIUM HYDROXIDE/ALUMINUM HYDROXICE/SIMETHICONE 120; 1200; 1200 MG/30ML; MG/30ML; MG/30ML
15 SUSPENSION ORAL ONCE
Status: COMPLETED | OUTPATIENT
Start: 2024-08-20 | End: 2024-08-20

## 2024-08-20 RX ORDER — OXYCODONE HYDROCHLORIDE 5 MG/1
5 TABLET ORAL ONCE
Status: COMPLETED | OUTPATIENT
Start: 2024-08-20 | End: 2024-08-20

## 2024-08-20 RX ORDER — KIT FOR THE PREPARATION OF TECHNETIUM TC 99M MEBROFENIN 45 MG/10ML
5 INJECTION, POWDER, LYOPHILIZED, FOR SOLUTION INTRAVENOUS ONCE
Status: COMPLETED | OUTPATIENT
Start: 2024-08-20 | End: 2024-08-20

## 2024-08-20 RX ORDER — RANOLAZINE 500 MG/1
500 TABLET, EXTENDED RELEASE ORAL 2 TIMES DAILY
Status: DISCONTINUED | OUTPATIENT
Start: 2024-08-20 | End: 2024-08-21 | Stop reason: HOSPADM

## 2024-08-20 RX ORDER — ONDANSETRON 2 MG/ML
4 INJECTION INTRAMUSCULAR; INTRAVENOUS ONCE
Status: COMPLETED | OUTPATIENT
Start: 2024-08-20 | End: 2024-08-20

## 2024-08-20 RX ORDER — PREDNISONE 5 MG/1
5 TABLET ORAL
Status: DISCONTINUED | OUTPATIENT
Start: 2024-08-20 | End: 2024-08-21 | Stop reason: HOSPADM

## 2024-08-20 RX ORDER — MAGNESIUM SULFATE 1 G/100ML
1 INJECTION INTRAVENOUS ONCE
Status: COMPLETED | OUTPATIENT
Start: 2024-08-20 | End: 2024-08-20

## 2024-08-20 RX ORDER — AMOXICILLIN 250 MG
2 CAPSULE ORAL 2 TIMES DAILY PRN
Status: DISCONTINUED | OUTPATIENT
Start: 2024-08-20 | End: 2024-08-21 | Stop reason: HOSPADM

## 2024-08-20 RX ORDER — POLYETHYLENE GLYCOL 3350 17 G/17G
17 POWDER, FOR SOLUTION ORAL 2 TIMES DAILY PRN
Status: DISCONTINUED | OUTPATIENT
Start: 2024-08-20 | End: 2024-08-21 | Stop reason: HOSPADM

## 2024-08-20 RX ORDER — PANTOPRAZOLE SODIUM 40 MG/1
40 TABLET, DELAYED RELEASE ORAL
Status: DISCONTINUED | OUTPATIENT
Start: 2024-08-20 | End: 2024-08-21 | Stop reason: HOSPADM

## 2024-08-20 RX ORDER — ACETAMINOPHEN 325 MG/1
650 TABLET ORAL EVERY 4 HOURS PRN
Status: DISCONTINUED | OUTPATIENT
Start: 2024-08-20 | End: 2024-08-21 | Stop reason: HOSPADM

## 2024-08-20 RX ORDER — VALSARTAN 160 MG/1
160 TABLET ORAL DAILY
Status: DISCONTINUED | OUTPATIENT
Start: 2024-08-20 | End: 2024-08-21 | Stop reason: HOSPADM

## 2024-08-20 RX ADMIN — MEBROFENIN 4.7 MILLICURIE: 45 INJECTION, POWDER, LYOPHILIZED, FOR SOLUTION INTRAVENOUS at 09:55

## 2024-08-20 RX ADMIN — OXYCODONE HYDROCHLORIDE 10 MG: 5 TABLET ORAL at 15:55

## 2024-08-20 RX ADMIN — ONDANSETRON 4 MG: 2 INJECTION INTRAMUSCULAR; INTRAVENOUS at 00:32

## 2024-08-20 RX ADMIN — ACETAMINOPHEN 650 MG: 325 TABLET ORAL at 16:05

## 2024-08-20 RX ADMIN — ASPIRIN 81 MG: 81 TABLET ORAL at 08:20

## 2024-08-20 RX ADMIN — METOPROLOL SUCCINATE 150 MG: 50 TABLET, EXTENDED RELEASE ORAL at 08:20

## 2024-08-20 RX ADMIN — ALUMINUM HYDROXIDE, MAGNESIUM HYDROXIDE, AND SIMETHICONE 15 ML: 1200; 120; 1200 SUSPENSION ORAL at 04:35

## 2024-08-20 RX ADMIN — OXYCODONE HYDROCHLORIDE 10 MG: 5 TABLET ORAL at 20:14

## 2024-08-20 RX ADMIN — ACETAMINOPHEN 650 MG: 325 TABLET ORAL at 04:36

## 2024-08-20 RX ADMIN — OXYCODONE HYDROCHLORIDE 5 MG: 5 TABLET ORAL at 00:32

## 2024-08-20 RX ADMIN — DAROLUTAMIDE 600 MG: 300 TABLET, FILM COATED ORAL at 17:17

## 2024-08-20 RX ADMIN — OXYCODONE HYDROCHLORIDE 5 MG: 5 TABLET ORAL at 04:36

## 2024-08-20 RX ADMIN — RANOLAZINE 500 MG: 500 TABLET, FILM COATED, EXTENDED RELEASE ORAL at 15:40

## 2024-08-20 RX ADMIN — PREDNISONE 5 MG: 5 TABLET ORAL at 08:20

## 2024-08-20 RX ADMIN — VALSARTAN 160 MG: 160 TABLET, FILM COATED ORAL at 08:20

## 2024-08-20 RX ADMIN — RANOLAZINE 500 MG: 500 TABLET, FILM COATED, EXTENDED RELEASE ORAL at 20:14

## 2024-08-20 RX ADMIN — MAGNESIUM SULFATE HEPTAHYDRATE 1 G: 1 INJECTION, SOLUTION INTRAVENOUS at 15:40

## 2024-08-20 RX ADMIN — ACETAMINOPHEN 650 MG: 325 TABLET ORAL at 20:14

## 2024-08-20 RX ADMIN — PANTOPRAZOLE SODIUM 40 MG: 40 TABLET, DELAYED RELEASE ORAL at 08:20

## 2024-08-20 ASSESSMENT — ACTIVITIES OF DAILY LIVING (ADL)
ADLS_ACUITY_SCORE: 39
ADLS_ACUITY_SCORE: 45
ADLS_ACUITY_SCORE: 39
ADLS_ACUITY_SCORE: 45
ADLS_ACUITY_SCORE: 39
ADLS_ACUITY_SCORE: 45
ADLS_ACUITY_SCORE: 39
ADLS_ACUITY_SCORE: 45
ADLS_ACUITY_SCORE: 45
ADLS_ACUITY_SCORE: 39

## 2024-08-20 NOTE — PROGRESS NOTES
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  Troponin mildly elevated, EKG nonischemic.  Also having right upper quadrant pain, right upper quadrant ultrasound notable for cholelithiasis, without gallbladder wall thickening or pericholecystic fluid.  Total bilirubin 1.3, direct 0.44 on admission. Common bile duct 5 mm.  Given these findings, his gallbladder does not appear to be the cause of his pain at this time.  Further, bilirubin has normalized at this time.  That said will pursue further imaging to verify this.    - Recommend obtaining HIDA scan  - If HIDA is positive, would consider IR versus GI consultation for drainage of the gallbladder, as he is not an optimal surgical candidate given his thrombocytopenia and active chemotherapy  - Surgery will continue to follow       Diet: Combination Diet Regular Diet Adult    DVT Prophylaxis: Per primary, okay for DVT ppx from surgery perspective  Rankin Catheter: Not present  Lines: PRESENT             Drains: None     Cardiac Monitoring: ACTIVE order. Indication: Chest pain/ ACS rule out (24 hours)  Code Status: Full Code      Clinically Significant Risk Factors Present on Admission            # Hypomagnesemia: Lowest Mg = 1.6 mg/dL in last 2 days, will replace as needed   # Hypoalbuminemia: Lowest albumin = 2.7 g/dL at 8/20/2024  6:06 AM, will monitor as appropriate  # Coagulation Defect: INR = 1.32 (Ref range: 0.85 - 1.15) and/or PTT = N/A, will monitor for bleeding  # Drug Induced Platelet Defect: home medication list includes an antiplatelet medication   # Hypertension: Noted on problem list   "  # Anemia: based on hgb <11       # Obesity: Estimated body mass index is 33.15 kg/m  as calculated from the following:    Height as of this encounter: 1.778 m (5' 10\").    Weight as of this encounter: 104.8 kg (231 lb).       # Financial/Environmental Concerns:                 Disposition Plan      Expected Discharge Date: 08/22/2024                 The patient's care was discussed with the Chief Resident/Fellow.    Quentin Rodriguez MD  [facility]  Non-urgent messages: Securely message with [employer] (more info)  Text page via [facility] Paging/Directory     ______________________________________________________________________    Interval History   Patient reports his pain is much improved since last night. He has not had any nausea or vomiting. Pain ongoing in the epigastrium and right upper quadrant.    Physical Exam   Vital Signs: Temp: 98.4  F (36.9  C) Temp src: Oral BP: 132/65 (MAP: 83) Pulse: 65   Resp: 18 SpO2: 99 % O2 Device: Nasal cannula Oxygen Delivery: 1 LPM  Weight: 231 lbs 0 oz  Intake/Output Summary (Last 24 hours) at 8/20/2024 1140  Last data filed at 8/20/2024 0400  Gross per 24 hour   Intake --   Output 450 ml   Net -450 ml     Awake and alert, no acute distress  Regular rate and rhythm to peripheral palpation, warm and well-perfused  Normal work of breathing on 1 L oxygen via nasal cannula  Subjective tenderness in the right upper quadrant, no rebound or guarding, no peritoneal signs, mild distention    Data   Sodium 140, stable  Potassium 4.1, stable  Creatinine 0.95, stable  LFTs within normal limits  Total bilirubin 1.1, from 1.3  WBC 1.4, from 2.7  Platelets 41, from 46  Hemoglobin 8.3, from 9.7  "

## 2024-08-20 NOTE — CONSULTS
Cardiology Inpatient Consultation  Date of Service: 08/20/2024  Patient: Gucci Logan  MRN: [id number]  Admission Date: 8/19/2024  Hospital Day: 0              IMPRESSION   Hypertrophic Cardiomyopathy  HTN  Metastatic prostate cancer           ASSESSMENT AND PLAN     Gucci Logan is a 74 year old male with metastatic prostate cancer c/b pathologic left femur fracture s/p repair 05/2024, radiation, on chemotherapy, cirrhosis 2/2 chronic Hepatitis C infection, chronic  thrombocytopenia, HTN, SHONDA, CKD3 who presents with one day of chest pain. Found to have mild troponin leak as well. At this time, do not believe this is due to ACS. Per review of echocardiogram on admission, appears to have HCM with septal thickening to 25mm, with evidence of hyperkinetic left ventricular function with elongation of anterior mitral valve leaflet with intermittent systolic anterior motion of the mitral valve, though no evidence of LVOT obstruction. Troponin leak likely due to microvascular dysfunction given degree of hypertrophy. Previous angiogram and stress MRI negative for coronary artery pathology. He is otherwise euvolemic and without chest pain     Recommendations:   Will begin Ranolazine 500mg BID (ordered for you)  Continue Metoprolol 150mg daily  Continue Valsartan 160mg daily  Per chart review, he is on hydrochlorothiazide 12.5mg daily, okay to continue from our standpoint  Consider genetic testing for himself and children      Plan of care discussed with Dr. Gonsales, who agrees with above plan.    Thank you for consulting the cardiovascular services at the [facility]. Please do not hesitate to call us with any questions.     Keke Laurent MD  Internal Medicine - Pediatrics Resident, PGY-4  [facility]  Available on [employer]               HISTORY OF PRESENT ILLNESS     Gucci Logan is a 74 year old male with metastatic prostate cancer c/b pathologic left  femur fracture s/p repair 2024, radiation, on chemotherapy, cirrhosis 2/2 chronic Hepatitis C infection, chronic  thrombocytopenia, HTN, SHONDA, CKD3 who presents with one day of chest pain.    Canelo states that yesterday he developed sub-sternal chest pain with associated squeezing pain from the sides of his chest. He says the pain felt as if his ribs were caving in. Also, endorsed nausea/vomiting as well. He has not had this pain before. He is primarily wheelchair bound since the femur repair. He states that before the femur repair he did not experience anginal symptoms. He does endorse that the nitroglycerin did help this pain.     At the time of interview, the patient denies chest pain, dyspnea at rest or with exertion, orthopnea, PND, palpitations, lightheadedness, or syncope.     Review of Systems:    Complete review of systems was performed and negative except per HPI.             CARDIAC HISTORY AND IMAGING     12-Lead EC2024: NSR    Transthoracic Echocardiogram(s):   2024:  Interpretation Summary  Known hypertrophic cardiomyopathy with asymmetric septal hypertrophy  phenotype.  Global and regional left ventricular function is normal with an EF of 60-65%.  There is asymmetric septal hypertrophy. The maximal wall thickness is 2.6 cm  in the basal anteroseptal segment. There is a resting LVOT gradient of 19 mmHg  that increases to 72 mmHg with Valsalva.  Global right ventricular function is normal. The right ventricle is normal  size.  There is systolic anterior motion of the mitral valve without septal contact.  Mild mitral regurgitation.  IVC diameter <2.1 cm collapsing >50% with sniff suggests a normal RA pressure  of 3 mmHg.  This study was compared with the study from 2023. No significant changes  noted.    2023:  Interpretation Summary  Global and regional left ventricular function is hyperkinetic with an EF >70%.  Basal septum 23mm. Mild EDGAR of anterior mitral leaflet. LVOT  gradient not  assessed. Concern for hypertrophic cardiomyopathy. Consider cardiac MRI if  clinically indicated.  Right ventricular function, chamber size, wall motion, and thickness are  normal.  The atrial septum is intact as assessed by color Doppler and agitated saline  bubble study .  The inferior vena cava is normal.  No pericardial effusion is present.    Catheterization(s):   01/19/2024:  DIAGNOSTIC - CORONARY   * Normal coronary arteries in a right dominant system   HEMODYNAMICS   * The LVEDP is within normal limits      Catheter pull back from LV apex to aorta shows NO significant gradient  ( NSR without PVC )     RECOMMENDATIONS & PLAN   * Medical Rx   * This study rules out epicardial disease, and also  LVOT track obstruction,  as etiologies for his MCKENNA.  Microvasular disease is possible, diastolic dysfunction, or non cardiac issues remain possibilities     CMR and/or Additional Imaging  12/14/2023: MR Cardiac Stress Imaging  Final conclusions:   1. There is asymmetric left ventricular septal hypertrophy with patchy mid   myocardial scar (as described below) along with systolic anterior motion   of the mitral valve.  Findings consistent with hypertrophic   cardiomyopathy.Stress MRI is suggestive of diffuse subendocardial ischemia   observed throughout the myocardium.  This perfusion pattern could   represent microvascular disease observed in hypertrophic cardiomyopathy   however epicardial coronary artery disease remains a possible etiology.    Recommend further assessment with alternative testing to rule out   obstructive CAD.   2. The gadolinium delayed enhancement is suggestive of a diffuse patchy   mid myocardial patchy scar present throughout the myocardium but most   pronounced in the basal to mid lateral wall and basal to distal septal   walls.  There is RV insertion scar noted as well.   3. The left ventricle size is normal.  The LV wall thickness is severely   increased.  Maximum septal thickness  in the basal septum 24 mm.  The LV   wall motion is normal. The LV systolic function is normal.  Calculated   LVEF is 57%.    4. The right ventricle is normal size with normal function.   5. Non cardiac finding will be reported separately per radiology.           PAST MEDICAL HISTORY      Past Medical History:   Diagnosis Date    Arthritis     Chronic, continuous use of opioids     Esophageal reflux 03/01/2014    Hearing problem     Hiatal hernia     Hypertension     Jaundice 05/31/2008    Liver disease     Obesity 01/24/2013    Obstructive sleep apnea     Sleep apnea     Advised CPAP machine. Not keen to use it.     Syncope, unspecified syncope type 2/20/2023     Active Problems:  Patient Active Problem List    Diagnosis Date Noted    Right upper quadrant abdominal pain 08/20/2024     Priority: Medium    Encounter for antineoplastic chemotherapy 07/29/2024     Priority: Medium    Prostate cancer metastatic to bone (H) 07/25/2024     Priority: Medium    Malignant neoplasm metastatic to lymph nodes of multiple sites (H) 07/18/2024     Priority: Medium    Metastasis to bone (H) 06/26/2024     Priority: Medium    Prostate cancer (H) 06/26/2024     Priority: Medium    Class 2 severe obesity due to excess calories with serious comorbidity in adult (H) 06/04/2024     Priority: Medium    Fall on same level from slipping, tripping or stumbling, initial encounter 05/20/2024     Priority: Medium    Fall from standing, initial encounter 05/19/2024     Priority: Medium    Intertrochanteric fracture of left femur, closed, initial encounter (H) 05/19/2024     Priority: Medium    Glaucoma suspect of both eyes 04/30/2024     Priority: Medium    Stage 3 chronic kidney disease, unspecified whether stage 3a or 3b CKD (H) 01/23/2024     Priority: Medium    Compression fracture of T5 vertebra with routine healing, subsequent encounter 02/28/2023     Priority: Medium    Diabetes mellitus without complication (H) 02/28/2023     Priority:  Medium    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) 02/28/2023     Priority: Medium    Compression fracture of T6 vertebra with routine healing 02/20/2023     Priority: Medium    Syncope, unspecified syncope type 02/20/2023     Priority: Medium    Fall 02/20/2023     Priority: Medium    Abdominal pain 07/09/2020     Priority: Medium    Portal vein thrombosis 07/09/2020     Priority: Medium    Non-insulin dependent type 2 diabetes mellitus (H) 02/20/2020     Priority: Medium    Diabetes mellitus, type 2 (H) 11/09/2018     Priority: Medium    Hyperlipidemia LDL goal <100 04/19/2017     Priority: Medium    Impaired fasting glucose 04/19/2017     Priority: Medium    Gastroesophageal reflux disease, esophagitis presence not specified 10/06/2016     Priority: Medium     IMO Regulatory Load OCT 2020      Obesity, unspecified obesity severity, unspecified obesity type 12/17/2015     Priority: Medium    Diverticular disease of colon 07/14/2015     Priority: Medium    Chronic nonalcoholic liver disease 08/04/2014     Priority: Medium    SHONDA (obstructive sleep apnea) 02/07/2014     Priority: Medium    Hypertension goal BP (blood pressure) < 140/90 02/08/2013     Priority: Medium    Vitamin D deficiency 09/19/2012     Priority: Medium     Overview:   9/19/2012      Osteoarthrosis 09/18/2012     Priority: Medium     Overview:   Lumbar spine, knees      Left ventricular hypertrophy 11/18/2011     Priority: Medium     Overview:   11/18/2011      Thrombocytopenia (H24) 08/28/2008     Priority: Medium     Overview:   8/28/2008, attributed to liver disease with portal hypertension and functional splenomegaly      Splenomegaly 07/25/2008     Priority: Medium     Overview:   7/25/2009, attributed to portal hypertension from cirrhosis      Lymphadenopathy 06/20/2008     Priority: Medium     Overview:   6/20/2008 5/31/2011, CT: Increasing mediastinal and hilar  lymphadenopathy and mild increase in perigastric lymph node. Findings are concerning for atypical infectious process. In the absence of pulmonary findings, with lymphomatous disorder in the differential. Clinical correlation is recommended.      Cirrhosis of liver (H) 2008     Priority: Medium    Jaundice 2008     Priority: Medium    Chronic hepatitis C virus infection (H) 2008     Priority: Medium    Chronic hepatitis C (H) 2008     Priority: Medium     Social History:  Social History     Tobacco Use    Smoking status: Former     Current packs/day: 0.00     Types: Cigarettes     Start date: 1990     Quit date: 1999     Years since quittin.6     Passive exposure: Never    Smokeless tobacco: Never   Vaping Use    Vaping status: Never Used   Substance Use Topics    Alcohol use: Yes     Comment: rare    Drug use: No     Family History:  Family History   Problem Relation Age of Onset    Alzheimer Disease Mother 85    Dementia Mother     Cerebrovascular Disease Father 50    Cancer Father     Hypertension Father     Neurologic Disorder No family hx of     Diabetes No family hx of        Medications:  Current Facility-Administered Medications   Medication Dose Route Frequency Provider Last Rate Last Admin    aspirin EC tablet 81 mg  81 mg Oral Daily Parminder Denson MD   81 mg at 24 0820    [Held by provider] darolutamide (NUBEQA) tablet 600 mg  600 mg Oral BID Parminder Denson MD        metoprolol succinate ER (TOPROL XL) 24 hr tablet 150 mg  150 mg Oral Daily Parminder Denson MD   150 mg at 24 0820    pantoprazole (PROTONIX) EC tablet 40 mg  40 mg Oral QAM AC Parminder Denson MD   40 mg at 24 0820    predniSONE (DELTASONE) tablet 5 mg  5 mg Oral Daily with breakfast Parminder Denson MD   5 mg at 24 0820    sodium chloride (PF) 0.9% PF flush 3 mL  3 mL Intracatheter Q8H Parminder Denson MD         technetium Tc 99m mebrofenin (CHOLETEC) radioisotope injection 5 millicurie  5 millicurie Intravenous Once Aertha Hernández MD        valsartan (DIOVAN) tablet 160 mg  160 mg Oral Daily Parminder Denson MD   160 mg at 08/20/24 0820       Current Facility-Administered Medications   Medication Dose Route Frequency Provider Last Rate Last Admin             PHYSICAL EXAM     Temp:  [98.4  F (36.9  C)] 98.4  F (36.9  C)  Pulse:  [65-90] 65  Resp:  [18] 18  BP: (123-175)/(49-75) 132/65  SpO2:  [92 %-100 %] 99 %    Intake/Output Summary (Last 24 hours) at 8/20/2024 0938  Last data filed at 8/20/2024 0400  Gross per 24 hour   Intake --   Output 450 ml   Net -450 ml     GEN: pleasant, no acute distress  HEENT: No discharge  EYES: no icterus  CV: RRR, normal s1/s2, no murmurs/rubs/s3/s4, no heave.  No JVP.   CHEST: clear to ausculation bilaterally, no rales or wheezing  ABD: soft, non-tender, normal active bowel sounds  : no flank/suprapubic tenderness  EXTR: pulses palpable. No clubbing, cyanosis or edema.   NEURO: alert oriented, speech fluent/appropriate, motor grossly nonfocal  PSYCH: cooperative, affect appropriate, pleasant            DIAGNOSTICS     All labs and imaging were reviewed, of note:    CMP  Recent Labs   Lab 08/20/24  0606 08/20/24  0255 08/19/24  1714 08/14/24  1219     --  140 142   POTASSIUM 4.1  --  4.2 4.4   CHLORIDE 109*  --  108* 109*   CO2 25  --  22 25   ANIONGAP 6*  --  10 8   * 115* 108* 96   BUN 19.4  --  23.4* 18.7   CR 0.95  --  0.91 0.84   GFRESTIMATED 85  --  89 >90   SOLEDAD 7.9*  --  8.3* 9.4   MAG 1.6*  --   --   --    PROTTOTAL 5.0*  --  5.6* 6.3*   ALBUMIN 2.7*  --  3.0* 3.1*   BILITOTAL 1.1  --  1.3* 0.6   ALKPHOS 120  --  149 227*   AST 28  --  32 46*   ALT 18  --  22 25     CBC  Recent Labs   Lab 08/20/24  0606 08/19/24  1714 08/14/24  1219   WBC 1.4* 2.7* 3.4*   RBC 2.85* 3.28* 3.45*   HGB 8.3* 9.7* 10.1*   HCT 27.6* 31.4* 32.7*   MCV 97 96 95   MCH 29.1 29.6  29.3   MCHC 30.1* 30.9* 30.9*   RDW 15.1* 15.1* 14.6   PLT 41* 46* 78*     INR  Recent Labs   Lab 08/19/24  1714   INR 1.32*     Arterial Blood GasNo lab results found in last 7 days.    Lab Results   Component Value Date    TROPI <0.015 07/08/2020

## 2024-08-20 NOTE — CONSULTS
[facility]    Consult Note - Emergency General Service  Date of Admission:  8/19/2024  Consult Requested by: ED  Reason for Consult: Evaluation for acute cholecystitis     Assessment & Plan: Surgery   Gucci Logan is a 74 year old male who with a past medical history of chrnoic hep C induced liver cirrhosis, portal hypertension, portal vein thrombosis, type 2 diabetes, hypertension, prostate cancer metastatic to bone s/p chemoradition with recent pathologic fracture left hip, stage III CKD,  pancytopenia, on ASA 81 mg for thromboembolism prophylaxis status post hip fracture, h/o cholelithiasis presenting to ED with substernal chest pain x1 day. Cardiac workup unrevealing so far with EKG NSR and mildly elevated troponin 51 and 57 on repeat measure. General Surgery was consult for evaluation of Acute cholecystitis.      Hypertensive, afebrile on admission, abdomen soft, mildly tender in RUQ with deep palpation but pain also points to substernal pain and left upper quadrant pain (not currently tender), pancytopenic on presentation, mildly elevated T gypsy 1.3 with D gypsy 0.44, other liver enzymes WNL. RUQ USG Cholelithiasis and GB lumen sludge, nonspecific mild wall thickening of gallbladder, CBD 5 mm, no pericholecystic fluid or other findings suggestive of acute cholecystitis..     Plan:  Unclear if gallbladder is source of his substernal/upper abdominal pain  Would recommend HIDA scan to assess for cholecystitis   If high clinical suspicion of acute cholecystitis on HIDA scan, patient would not be a good candidate for cholecystectomy given his active chemotherapy, pancytopenia, liver cirrhosis.  And we would consider placing a percutaneous cholecystostomy tube instead.  Recommend admission to medicine for ongoing workup of chest pain  General Surgery will continue to follow  Recommend CBC, CMP in AM    Clinically Significant Risk Factors Present on Admission     "          # Hypoalbuminemia: Lowest albumin = 3 g/dL at 8/19/2024  5:14 PM, will monitor as appropriate  # Coagulation Defect: INR = 1.32 (Ref range: 0.85 - 1.15) and/or PTT = N/A, will monitor for bleeding  # Drug Induced Platelet Defect: home medication list includes an antiplatelet medication   # Hypertension: Noted on problem list    # Anemia: based on hgb <11       # Obesity: Estimated body mass index is 33.15 kg/m  as calculated from the following:    Height as of this encounter: 1.778 m (5' 10\").    Weight as of this encounter: 104.8 kg (231 lb).       # Financial/Environmental Concerns:         The patient's care was discussed with the Chief Resident/Fellow.    Mikaela Cardona MD  [facility]  Non-urgent messages: Securely message with [employer] (more info)  Text page via [facility] Paging/Directory     ______________________________________________________________________    Chief Complaint   Substernal/upper abdominal pain    History is obtained from the patient    History of Present Illness   Gucci Logan is a 74 year old male who with a past medical history of chrnoic hep C induced liver cirrhosis, portal hypertension, portal vein thrombosis, type 2 diabetes, hypertension, prostate cancer metastatic to bone s/p chemoradition with recent pathologic fracture left hip, stage III CKD,  pancytopenia, on ASA 81 mg for thromboembolism prophylaxis status post hip fracture, h/o cholelithiasis presenting to ED with substernal chest pain x1 day.     Patient started complaining of sudden onset substernal crushing chest pain when he was at his orthopedic appointment today in the clinic at around 3 PM.  He was sitting at that time when the pain started without any obvious provoking factor.  Patient reports pain as constricting/squeezing bandlike pain substernally extending to left and right upper quadrants of abdomen.  Pain was associated with ongoing nausea but does not report " any vomiting.  Does not report feeling any shortness of breath or lightheadedness or relationship of pain with food/deep breathing, any numbness or tingling or weakness in any extremities.   Passing gas, having bowel movements.    He was given aspirin 324 mg and 1 nitroglycerin tablet, and transported to ED for further evaluation.  He reported decrease in chest pain with the medications.  Patient reports pain and tenderness in right upper quadrant but says that pain in substernal area and the left upper abdomen has subsided with the pain medication.    Patient reports having similar kind of episode 7 to 8 years back for which she was reportedly evaluated in the ED and was diagnosed of some thrombus in the liver.  I am unable to find these records in epic.    Past Medical History    Past Medical History:   Diagnosis Date    Arthritis     Chronic, continuous use of opioids     Esophageal reflux 03/01/2014    Hearing problem     Hiatal hernia     Hypertension     Jaundice 05/31/2008    Liver disease     Obesity 01/24/2013    Obstructive sleep apnea     Sleep apnea     Advised CPAP machine. Not keen to use it.     Syncope, unspecified syncope type 2/20/2023       Past Surgical History   Past Surgical History:   Procedure Laterality Date    ARTHROSCOPY KNEE RT/LT      (L) with partial medial meniscectomy    CATARACT IOL, RT/LT  Nov and Dec 2017    COLONOSCOPY N/A 4/24/2019    Procedure: COLONOSCOPY, WITH POLYPECTOMY AND BIOPSY;  Surgeon: Leventhal, Thomas Michael, MD;  Location: [facility]    COLONOSCOPY N/A 9/14/2022    Procedure: COLONOSCOPY;  Surgeon: Rafa Renee MD;  Location:  GI    DACRYOCYSTORHINOSTOMY Left 10/16/2018    Procedure: DACRYOCYSTORHINOSTOMY;  Surgeon: Madhu Krause MD;  Location: [facility]    ENDOSCOPIC ENDONASAL SURGERY  1994    ENDOSCOPY  2-19-15    ESOPHAGOSCOPY, GASTROSCOPY, DUODENOSCOPY (EGD), COMBINED N/A 4/24/2019    Procedure: COMBINED ESOPHAGOSCOPY, GASTROSCOPY, DUODENOSCOPY (EGD) -  hold aspirin ibuprofen or naproxen for one week prior (per physician order);  Surgeon: Leventhal, Thomas Michael, MD;  Location: [facility] OR    ESOPHAGOSCOPY, GASTROSCOPY, DUODENOSCOPY (EGD), COMBINED N/A 5/23/2023    Procedure: Esophagoscopy, gastroscopy, duodenoscopy, combined;  Surgeon: Rafa Renee MD;  Location: [facility] GI    EYE SURGERY      Hernia surgery Left 1994    NASAL/SINUS POLYPECTOMY      OPEN REDUCTION INTERNAL FIXATION HIP NAILING Left 5/20/2024    Procedure: open reduction internal fixation hip nailing left;  Surgeon: Rafa Wagner MD;  Location: [facility] OR    REPAIR PTOSIS Left 10/16/2018    Procedure: LEFT UPPER LID PTOSIS AND BILATERAL  BROW PTOSIS REPAIR WITH LEFT DACRYOCYSTORHINOSTOMY ;  Surgeon: Madhu Krause MD;  Location:  [facility]    REPAIR PTOSIS BROW Bilateral 10/16/2018    Procedure: REPAIR PTOSIS BROW;  Surgeon: Madhu Krause MD;  Location:  [facility]    ROTATOR CUFF REPAIR RT/LT Right     ZZC OPEN RX ANKLE DISLOCATN+FIXATN      (R)    ZZC SPINAL FUSION,ANT,EA ADNL LEVEL      T12 - L1       Physical Exam   Vital Signs:     BP: 123/67 Pulse: 90   Resp: 18 SpO2: 100 %      Weight: 231 lbs 0 ozNo intake or output data in the 24 hours ending 08/19/24 2118  General Appearance: Alert, oriented x 3, nontoxic-appearing  Respiratory: Nonlabored breathing on room air  Cardiovascular: Regular rate and rhythm by radial pulse  Abdomen: Soft, distended, flanks full, mild tender in right upper quadrant on deep palpation, no guarding or rigidity or rebound tenderness, umbilical hernia  Skin: Warm, dry    Data     I have personally reviewed the following data over the past 24 hrs:    2.7 (L)  \   9.7 (L)   / 46 (LL)     140 108 (H) 23.4 (H) /  108 (H)   4.2 22 0.91 \     ALT: 22 AST: 32 AP: 149 TBILI: 1.3 (H)   ALB: 3.0 (L) TOT PROTEIN: 5.6 (L) LIPASE: 23     Trop: 57 (H) BNP: N/A     INR:  1.32 (H) PTT:  N/A   D-dimer:  9.24 (H) Fibrinogen:  N/A       Imaging results reviewed over the past 24 hrs:    Recent Results (from the past 24 hour(s))   XR Pelvis and Hip Left 1 View    Narrative    Exam: Single frontal view of the pelvis and AP and frog-leg lateral  view of the left hip dated 8/19/2024.    COMPARISON: 7/8/2024.    CLINICAL HISTORY: Intertrochanteric fracture.    FINDINGS: Single frontal view of the pelvis and AP and frog-leg  lateral view of the left hip was obtained. Joint space narrowing in  both hip joints. Postsurgical changes of femoral screw and short  intramedullary oliver in the left femur from known intertrochanteric  fracture. Surgical hardware appears intact. No femoral head collapse.      Impression    IMPRESSION: Stable postsurgical changes of placement of the femoral  screw and short intramedullary oliver into the left hip for a known  intertrochanteric fracture. No hardware complication.    GEORGIA WHITE MD         SYSTEM ID:  [id number]   US Abdomen Limited (RUQ)    Narrative    EXAMINATION: US ABDOMEN LIMITED, 8/19/2024 6:18 PM    COMPARISON: Head CT 7/3/2024. Ultrasound 3/18/2024    HISTORY: ruq pain hx gallstones    TECHNIQUE: The abdomen was scanned in standard fashion with  specialized ultrasound transducer(s) using both grey scale and limited  color Doppler techniques.    FINDINGS:   Fluid: Small volume anechoic ascites    Liver: The liver heterogeneous echotexture, measuring 17.8 cm in  craniocaudal dimension. Surface nodularity of the hepatic capsule.  There is no focal mass. The main portal vein is patent with antegrade  flow towards the liver. Hepatic veins are patent with appropriate flow  towards the IVC.    Gallbladder: Cholelithiasis and sludge within the gallbladder lumen.  Negative sonographic Alcantara sign. Mild wall thickening measuring up to  4 mm. No pericholecystic fluid or hyperemia.    Bile Ducts: Both the intra- and extrahepatic biliary system are of  normal caliber.  The common bile duct measures 5 mm in diameter.    Pancreas: Visualized portions of the head and body of  the pancreas are  unremarkable.     Kidney: The right kidney measures 10.7 cm long. There is no  hydronephrosis or hydroureter, no shadowing renal calculi, cystic  lesion or mass. Very ill-defined hypoechoic area about the renal hilum  (image 32 and 33).    Visualized portions of the aorta are normal caliber      Impression    IMPRESSION:   1.  Cholelithiasis and sludge within gallbladder lumen without  definite findings to suggest acute cholecystitis. Mild wall thickening  of the gallbladder is nonspecific.  2.  Cirrhosis with small volume ascites.  3.  Ill-defined hypoechoic area about the renal hilum is favored to be  artifactual. No suspicious findings on recent imaging. Could consider  nonemergent follow-up ultrasound reevaluation.    I have personally reviewed the examination and initial interpretation  and I agree with the findings.    SAMUEL QUIÑONES MD         SYSTEM ID:  [id number]   XR Chest 2 Views    Narrative    EXAM: XR CHEST 2 VIEWS  8/19/2024 6:43 PM      HISTORY: chest pain    COMPARISON: PET CT 7/3/2024    FINDINGS: Two views of the chest. Right chest wall Port-A-Cath with  tip projecting over the right atrium. Trachea is midline. Normal  cardiomediastinal silhouette. No pleural effusion, consolidation or  pneumothorax. No acute osseous abnormalities. Partially visualized  spinal hardware.      Impression    IMPRESSION: No acute cardiopulmonary findings.    I have personally reviewed the examination and initial interpretation  and I agree with the findings.    SAMUEL QUIÑONES MD         SYSTEM ID:  [id number]

## 2024-08-20 NOTE — CONSULTS
Discharge Pharmacy Test Claim    Duplicate consult. Please see Liaison's note from 8/20/24 at 12:20pm.    Tanvi Stack  [facility] Pharmacy Liaison  Phone: [phone] Fax: [phone]  Available on Teams & Vocera

## 2024-08-20 NOTE — PROVIDER NOTIFICATION
"Yomi sent via [employer] to Nikky Baxter    \"FYI patients platelets from 0606 lab this morning was 41- not sure if you were notified yet\"  "

## 2024-08-20 NOTE — PLAN OF CARE
" Goal Outcome Evaluation:     Plan of Care Reviewed With: patient    Overall Patient Progress: improvingOverall Patient Progress: improving    Problem: Comorbidity Management  Goal: Blood Pressure in Desired Range  Intervention: Maintain Blood Pressure Management  Recent Flowsheet Documentation  Taken 8/20/2024 1800 by Margarita Palmer RN  Medication Review/Management: medications reviewed  BP: 132/65    RN: [id number]    Vital signs: /65 (BP Location: Left arm, Patient Position: Semi-Story's, Cuff Size: Adult Regular)   Pulse 65   Temp 98.4  F (36.9  C) (Oral)   Resp 18   Ht 1.778 m (5' 10\")   Wt 104.8 kg (231 lb)   SpO2 98%   BMI 33.15 kg/m      Status: 73 y/o M presented to ED 8/19 w/ chest pain on Rt side of sternum.   History: T2DM, HTN, portal vein thrombosis, liver cirrhosis, prostate cancer metastatic to bone.  Neuro: A&Ox4. Pleasant.   Pain/Nausea: Rec'd oxycodone paired with tylenol x1 this shift for pain 8/10 in chest. Reports pain decreased to a 5/10 after administration. Pt declined offer of any other PRN medications.   Cardiac: X- chest pain. On tele.   Respiratory: MCKENNA  Mobility: Ax2 w/ walker  Diet: Combo regular  Labs: Team paged about plt of 41.   LDAs: R chest port- SL.   Skin/incisions: No new concerns.   GI/: WDL.Commode at bedside  New Changes: HIDA scan this morning came back negative for cholecystitis.     Plan: See how pt reacts to ranolazine (antianginal). Possibility of discharge tomorrow. Call light within reach. Able to make needs known. Continue with plan of care.                  "

## 2024-08-20 NOTE — CONSULTS
Discharge Pharmacy Test Claim    Patient's [employer] Part D plan covers ranolazine with an expected monthly copay of $7.    Test Claim Copay   Ranolazine 7.00       Tanvi Stack  [facility] Pharmacy Liaison  Phone: [phone] Fax: [phone]  Available on Teams & Vocera'

## 2024-08-20 NOTE — UTILIZATION REVIEW
Admission Status; Secondary Review Determination         Under the authority of the Utilization Management Committee, the utilization review process indicated a secondary review on the above patient.  The review outcome is based on review of the medical records, discussions with staff, and applying clinical experience noted on the date of the review.        (x)      Inpatient Status Appropriate - This patient's medical care is consistent with medical management for inpatient care and reasonable inpatient medical practice.     RATIONALE FOR DETERMINATION   The patient is a 74-year-old male admitted on 8/19/2024.  Patient is currently on chemotherapy and has a new metastatic prostate bone fracture that is seen by orthopedics.  He comes to the hospital for evaluation of chest pain.  On exam in the ED he was found to have an ultrasound showing cholelithiasis and sludge within the gallbladder.  Mild wall thickening is noted.  The patient also has cirrhosis with small volume ascites.  Surgery is consulted and recommends a HIDA scan with possible outcomes of either cholecystectomy or GI placed common bile duct stent to provide drainage depending on HIDA scan results.  Patient does have significant pancytopenia with a white count of 1.4, hemoglobin of 8.3 and platelet count of 41 with obvious immunosuppression with chemotherapy agents on board.  Based on complexity and need to treat potential gallbladder disease, agree with current inpatient status as the patient will not be discharging today.      The severity of illness, intensity of service provided, expected LOS and risk for adverse outcome make the care complex, high risk and appropriate for hospital admission.        The information on this document is developed by the utilization review team in order for the business office to ensure compliance.  This only denotes the appropriateness of proper admission status and does not reflect the quality of care rendered.          The definitions of Inpatient Status and Observation Status used in making the determination above are those provided in the CMS Coverage Manual, Chapter 1 and Chapter 6, section 70.4.      Sincerely,     Vitaly Reyes MD  Physician Advisor  Utilization Review/ Case Management  [facility].

## 2024-08-20 NOTE — H&P
[facility]    History and Physical - Medicine Service, MAROON TEAM 4       Date of Admission:  8/19/2024    Assessment & Plan      Gucci Logan is a 74 year old male admitted on 8/19/2024. He has a history of metastatic prostate cancer complicated by bone metastases and left femur fracture s/p repair, cirrhosis secondary to chronic hepatitis C infection, thrombocytopenia, hypertension, and SHONDA is admitted for chest pain.    #Chest pain  #Elevated troponin  #Coronary artery disease  #Hypertension  The patient reports having substernal chest pain at rest that improved with nitroglycerin and aspirin. In the ED, ECG was negative for ST changes and showed sinus rhythm with left anterior fascicular block and troponin was elevated and trended from 51 to 57 to 63. On admission, HEART score is 6. Last echocardiogram 12/04/23 with EF>70%  and concern for hypertrophic cardiomyopathy. Last stress test on 9/12/23 was negative for inducible myocardial ischemia or infarction. We will plan on ordering an echocardiogram and consulting cardiology for further evaluation.  - Echocardiogram  - Cardiology consult  - PTA aspirin 81mg daily  - PTA metoprolol 150mg daily   - PTA valsartan 160mg daily    #Right upper quadrant abdominal pain  The patient also reports having right upper quadrant abdominal pain. RUQ US with mild wall thickening of the gallbladder. Surgery was consulted and recommended the patient receive a HIDA scan for further evaluation.  - General surgery following, appreciate recommendations  - HIDA scan    #Metastatic prostate cancer  #Pathologic fracture of left femur secondary to metastatic adenocarcinoma  The patient was recently diagnosed with prostate cancer and a pathologic fracture of his left femur and was recently started on darolutamide therapy.   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  - Daily CMP     #GERD   - PTA pantoprazole 40mg daily            Diet: Combination Diet Regular Diet Adult  DVT Prophylaxis: Pneumatic Compression Devices  Rankin Catheter: Not present  Fluids: None  Lines: PRESENT             Cardiac Monitoring: ACTIVE order. Indication: Chest pain/ ACS rule out (24 hours)  Code Status: Full Code    Clinically Significant Risk Factors Present on Admission            # Hypomagnesemia: Lowest Mg = 1.6 mg/dL in last 2 days, will replace as needed   # Hypoalbuminemia: Lowest albumin = 2.7 g/dL at 8/20/2024  6:06 AM, will monitor as appropriate    # Coagulation Defect: INR = 1.32 (Ref range: 0.85 - 1.15) and/or PTT = N/A, will monitor for bleeding  # Drug Induced Platelet Defect: home medication list includes an antiplatelet medication   # Hypertension: Noted on problem list    # Anemia: based on hgb <11       # Obesity: Estimated body mass index is 33.15 kg/m  as calculated from the following:    Height as of this encounter: 1.778 m (5' 10\").    Weight as of this encounter: 104.8 kg (231 lb).         # Financial/Environmental Concerns:                 Disposition Plan      Expected Discharge Date: 08/22/2024                The patient's care was discussed with the Attending Physician, Dr. Blackwell .      Panda Torres MD  Medicine Service, MAROON TEAM 4  [facility]  Securely message with [employer] (more info)  Text page via [facility] Paging/Directory   See signed in provider for up to date coverage information  ______________________________________________________________________    Chief Complaint   Chest pain    History is obtained from the patient    History of Present Illness   Gucci Logan is a 74 year old " "male admitted on 8/19/2024. He has a history of metastatic prostate cancer complicated by bone metastases and left femur fracture s/p repair, cirrhosis secondary to chronic hepatitis C infection, thrombocytopenia, hypertension, and SHONDA is admitted for chest pain.    The patient reports that 1 day prior to admission while sitting in the waiting room for an orthopedics appointment he started having pain in his sternum with heavy flushing as well as pain underneath his ribs on both sides. During this episode he felt that his breathing was labored and pronounced and he had a headache, nausea, and worsening pain and called EMS. Was given nitroglycerin and baby aspirin, which immediately improved CP, then it came back. Was here in the ED, then having sternal pain under the right lower side of his chest over the upper right quadrant of his abdomen, has been trying to sleep. CP is pronounced, was having some painful \"palpitations\", pressure and sharp pain. He reports as had an egg, swiss cheese toast yesterday morning, long before he started having the pain. Currently having pain, in the upper abdmen and RUQ and underneatht the ribs.     On review of systems he has a mild cough recently with mild yellow mucous. No vomiting. No constipation or diarrhea, no blood in stool, dysuria. Has bruising around port, put in 8/12/24, used it 8/14/24, just noticed it today. Has leg swelling which has been increasing lately. No smokikng for 20-30 years, no alcohol, no other drugs.     Past Medical History    Past Medical History:   Diagnosis Date    Arthritis     Chronic, continuous use of opioids     Esophageal reflux 03/01/2014    Hearing problem     Hiatal hernia     Hypertension     Jaundice 05/31/2008    Liver disease     Obesity 01/24/2013    Obstructive sleep apnea     Sleep apnea     Advised CPAP machine. Not keen to use it.     Syncope, unspecified syncope type 2/20/2023       Past Surgical History   Past Surgical History: "   Procedure Laterality Date    ARTHROSCOPY KNEE RT/LT      (L) with partial medial meniscectomy    CATARACT IOL, RT/LT  Nov and Dec 2017    COLONOSCOPY N/A 4/24/2019    Procedure: COLONOSCOPY, WITH POLYPECTOMY AND BIOPSY;  Surgeon: Leventhal, Thomas Michael, MD;  Location: [facility] OR    COLONOSCOPY N/A 9/14/2022    Procedure: COLONOSCOPY;  Surgeon: Rafa Renee MD;  Location: [facility]    DACRYOCYSTORHINOSTOMY Left 10/16/2018    Procedure: DACRYOCYSTORHINOSTOMY;  Surgeon: Madhu Krause MD;  Location: [facility]    ENDOSCOPIC ENDONASAL SURGERY  1994    ENDOSCOPY  2-19-15    ESOPHAGOSCOPY, GASTROSCOPY, DUODENOSCOPY (EGD), COMBINED N/A 4/24/2019    Procedure: COMBINED ESOPHAGOSCOPY, GASTROSCOPY, DUODENOSCOPY (EGD) - hold aspirin ibuprofen or naproxen for one week prior (per physician order);  Surgeon: Leventhal, Thomas Michael, MD;  Location: [facility] OR    ESOPHAGOSCOPY, GASTROSCOPY, DUODENOSCOPY (EGD), COMBINED N/A 5/23/2023    Procedure: Esophagoscopy, gastroscopy, duodenoscopy, combined;  Surgeon: Rafa Renee MD;  Location:  GI    EYE SURGERY      Hernia surgery Left 1994    NASAL/SINUS POLYPECTOMY      OPEN REDUCTION INTERNAL FIXATION HIP NAILING Left 5/20/2024    Procedure: open reduction internal fixation hip nailing left;  Surgeon: Rafa Wagner MD;  Location:  OR    REPAIR PTOSIS Left 10/16/2018    Procedure: LEFT UPPER LID PTOSIS AND BILATERAL  BROW PTOSIS REPAIR WITH LEFT DACRYOCYSTORHINOSTOMY ;  Surgeon: Madhu Krause MD;  Location: [facility]    REPAIR PTOSIS BROW Bilateral 10/16/2018    Procedure: REPAIR PTOSIS BROW;  Surgeon: Madhu Krause MD;  Location: [facility]    ROTATOR CUFF REPAIR RT/LT Right     ZZC OPEN RX ANKLE DISLOCATN+FIXATN      (R)    ZZC SPINAL FUSION,ANT,EA ADNL LEVEL      T12 - L1       Prior to Admission Medications   Prior to Admission Medications   Prescriptions Last Dose Informant Patient Reported? Taking?   acetaminophen (TYLENOL) 325 MG tablet   No No   Sig: Take 2 tablets  (650 mg) by mouth every 4 hours as needed for other (For optimal non-opioid multimodal pain management to improve pain control.)   aspirin 81 MG EC tablet   No No   Sig: Take 1 tablet (81 mg) by mouth daily   calcium citrate (CITRACAL) 950 (200 Ca) MG tablet   No No   Sig: Take 1 tablet (950 mg) by mouth daily   darolutamide (NUBEQA) 300 MG tablet   No No   Sig: Take 2 tablets (600 mg) by mouth 2 times daily for 21 days . Swallow tablets whole. Take with food. Avoid grapefruit or grapefruit juice.   dexAMETHasone (DECADRON) 4 MG tablet   No No   Sig: Take 2 tablets (8 mg) by mouth 2 times daily (with meals) for 3 days Start evening of Docetaxel infusion and continue for a total of 3 doses.  Repeat with each cycle of chemotherapy.   diclofenac (VOLTAREN) 1 % topical gel   No No   Sig: Apply 4 g topically 3 times daily as needed for moderate pain (bursitis)   lidocaine-prilocaine (EMLA) 2.5-2.5 % external cream   No No   Sig: Apply to Port-A-Cath site 1 hour prior to venous access.   metFORMIN (GLUCOPHAGE) 500 MG tablet   No No   Sig: Take 1 tablet (500 mg) by mouth 2 times daily (with meals)   Patient taking differently: Take 1,000 mg by mouth daily (with breakfast)   metoprolol succinate ER (TOPROL XL) 100 MG 24 hr tablet   No No   Si 1/2 ( 150 mg ) po daily   multivitamin w/minerals (THERA-VIT-M) tablet  Self Yes No   Sig: Take 1 tablet by mouth daily   neomycin-polymixin-dexAMETHasone (MAXITROL) 0.1 % ophthalmic suspension   No No   Si drop to left eye every 6 hours x1 week.   omeprazole (PRILOSEC) 20 MG DR capsule   No No   Sig: TAKE 1 CAPSULE BY MOUTH ONCE DAILY 30 TO 60 MINUTES BEFORE A MEAL   ondansetron (ZOFRAN) 8 MG tablet   No No   Sig: Take 1 tablet (8 mg) by mouth every 8 hours as needed for nausea   oxyCODONE (ROXICODONE) 5 MG tablet   No No   Sig: Take 1-2 tablets (5-10 mg) by mouth every 4 hours as needed for pain   predniSONE (DELTASONE) 5 MG tablet   No No   Sig: Take 1 tablet (5 mg) by mouth  daily (with breakfast) Do not take prednisone on days when taking dexamethasone.   prochlorperazine (COMPAZINE) 10 MG tablet   No No   Sig: Take 0.5 tablets (5 mg) by mouth every 6 hours as needed for nausea or vomiting   senna-docusate (SENOKOT-S/PERICOLACE) 8.6-50 MG tablet   No No   Sig: Take 1 tablet by mouth 2 times daily as needed for constipation   tiZANidine (ZANAFLEX) 4 MG tablet   No No   Sig: TAKE 1/2 (ONE-HALF) TO 1  TABLET BY MOUTH UP TO THREE TIMES DAILY AS NEEDED   Patient taking differently: Take 2-4 mg by mouth 3 times daily as needed for muscle spasms Usually takes at 4pm with tramadol   tiZANidine (ZANAFLEX) 4 MG tablet   No No   Si/2 to 1 po up to 3 x daily as needed for muscle pain   valsartan (DIOVAN) 160 MG tablet   No No   Sig: Take 1 tablet (160 mg) by mouth daily   vitamin D3 (CHOLECALCIFEROL) 50 mcg (2000 units) tablet   No No   Sig: Take 1 tablet (50 mcg) by mouth daily      Facility-Administered Medications: None        Review of Systems    The 10 point Review of Systems is negative other than noted in the HPI or here.    Social History   I have reviewed this patient's social history and updated it with pertinent information if needed.  Social History     Tobacco Use    Smoking status: Former     Current packs/day: 0.00     Types: Cigarettes     Start date: 1990     Quit date: 1999     Years since quittin.6     Passive exposure: Never    Smokeless tobacco: Never   Vaping Use    Vaping status: Never Used   Substance Use Topics    Alcohol use: Yes     Comment: rare    Drug use: No         Family History   I have reviewed this patient's family history and updated it with pertinent information if needed.  Family History   Problem Relation Age of Onset    Alzheimer Disease Mother 85    Dementia Mother     Cerebrovascular Disease Father 50    Cancer Father     Hypertension Father     Neurologic Disorder No family hx of     Diabetes No family hx of          Allergies    Allergies   Allergen Reactions    Bee Venom Swelling     Gum swelling    Haemophilus B Polysaccharide Vaccine Other (See Comments)     PN: delirium  fever    Haemophilus Influenzae Nausea and Other (See Comments)     PN: delirium  fever    Other Reaction(s): Fever    PN: delirium  fever   PN: delirium  fever    Pneumococcal Vaccine      Other Reaction(s): *Unknown, adverse reaction        Physical Exam   Vital Signs: Temp: 98.4  F (36.9  C) Temp src: Oral BP: 132/65 (MAP: 83) Pulse: 65   Resp: 18 SpO2: 99 % O2 Device: Nasal cannula Oxygen Delivery: 1 LPM  Weight: 231 lbs 0 oz    Constitutional: awake, alert, cooperative, no apparent distress, and appears stated age  Respiratory: No increased work of breathing, good air exchange, clear to auscultation bilaterally, no crackles or wheezing  Cardiovascular: Regular rate and rhythm and systolic murmur noted.  GI: Soft, nondistended, mild epigastric tenderness, negative Alcantara's sign  Skin: Bruising noted around PICC site  Neurologic: No focal deficits noted    Medical Decision Making       Please see A&P for additional details of medical decision making.      Data     I have personally reviewed the following data over the past 24 hrs:    1.4 (L)  \   8.3 (L)   / 41 (LL)     140 109 (H) 19.4 /  145 (H)   4.1 25 0.95 \     ALT: 18 AST: 28 AP: 120 TBILI: 1.1   ALB: 2.7 (L) TOT PROTEIN: 5.0 (L) LIPASE: 23     Trop: 63 (H) BNP: N/A     INR:  1.32 (H) PTT:  N/A   D-dimer:  9.24 (H) Fibrinogen:  N/A       Imaging results reviewed over the past 24 hrs:   Recent Results (from the past 24 hour(s))   XR Pelvis and Hip Left 1 View    Narrative    Exam: Single frontal view of the pelvis and AP and frog-leg lateral  view of the left hip dated 8/19/2024.    COMPARISON: 7/8/2024.    CLINICAL HISTORY: Intertrochanteric fracture.    FINDINGS: Single frontal view of the pelvis and AP and frog-leg  lateral view of the left hip was obtained. Joint space narrowing in  both hip joints.  Postsurgical changes of femoral screw and short  intramedullary oliver in the left femur from known intertrochanteric  fracture. Surgical hardware appears intact. No femoral head collapse.      Impression    IMPRESSION: Stable postsurgical changes of placement of the femoral  screw and short intramedullary oliver into the left hip for a known  intertrochanteric fracture. No hardware complication.    GEORGIA WHITE MD         SYSTEM ID:  [id number]   US Abdomen Limited (RUQ)    Narrative    EXAMINATION: US ABDOMEN LIMITED, 8/19/2024 6:18 PM    COMPARISON: Head CT 7/3/2024. Ultrasound 3/18/2024    HISTORY: ruq pain hx gallstones    TECHNIQUE: The abdomen was scanned in standard fashion with  specialized ultrasound transducer(s) using both grey scale and limited  color Doppler techniques.    FINDINGS:   Fluid: Small volume anechoic ascites    Liver: The liver heterogeneous echotexture, measuring 17.8 cm in  craniocaudal dimension. Surface nodularity of the hepatic capsule.  There is no focal mass. The main portal vein is patent with antegrade  flow towards the liver. Hepatic veins are patent with appropriate flow  towards the IVC.    Gallbladder: Cholelithiasis and sludge within the gallbladder lumen.  Negative sonographic Alcantara sign. Mild wall thickening measuring up to  4 mm. No pericholecystic fluid or hyperemia.    Bile Ducts: Both the intra- and extrahepatic biliary system are of  normal caliber.  The common bile duct measures 5 mm in diameter.    Pancreas: Visualized portions of the head and body of the pancreas are  unremarkable.     Kidney: The right kidney measures 10.7 cm long. There is no  hydronephrosis or hydroureter, no shadowing renal calculi, cystic  lesion or mass. Very ill-defined hypoechoic area about the renal hilum  (image 32 and 33).    Visualized portions of the aorta are normal caliber      Impression    IMPRESSION:   1.  Cholelithiasis and sludge within gallbladder lumen without  definite findings to  suggest acute cholecystitis. Mild wall thickening  of the gallbladder is nonspecific.  2.  Cirrhosis with small volume ascites.  3.  Ill-defined hypoechoic area about the renal hilum is favored to be  artifactual. No suspicious findings on recent imaging. Could consider  nonemergent follow-up ultrasound reevaluation.    I have personally reviewed the examination and initial interpretation  and I agree with the findings.    SAMUEL QUIÑONES MD         SYSTEM ID:  [id number]   XR Chest 2 Views    Narrative    EXAM: XR CHEST 2 VIEWS  8/19/2024 6:43 PM      HISTORY: chest pain    COMPARISON: PET CT 7/3/2024    FINDINGS: Two views of the chest. Right chest wall Port-A-Cath with  tip projecting over the right atrium. Trachea is midline. Normal  cardiomediastinal silhouette. No pleural effusion, consolidation or  pneumothorax. No acute osseous abnormalities. Partially visualized  spinal hardware.      Impression    IMPRESSION: No acute cardiopulmonary findings.    I have personally reviewed the examination and initial interpretation  and I agree with the findings.    SAMUEL QUIÑONES MD         SYSTEM ID:  [id number]   CT Chest Pulmonary Embolism w Contrast    Narrative    EXAM: CT CHEST PULMONARY EMBOLISM W CONTRAST  LOCATION: [facility]  DATE: 8/19/2024    INDICATION: chest pain, sob, hx cancer  COMPARISON: None.  TECHNIQUE: CT chest pulmonary angiogram during arterial phase injection of IV contrast. Multiplanar reformats and MIP reconstructions were performed. Dose reduction techniques were used.   CONTRAST: iopamidol (ISOVUE 370) solution 73 mL    FINDINGS:  ANGIOGRAM CHEST: Pulmonary arteries are normal caliber and negative for pulmonary emboli. Thoracic aorta is negative for dissection. No CT evidence of right heart strain.    LUNGS AND PLEURA: Dependent atelectasis in the posterior lung bases. No pleural effusions. No focal airspace consolidations or concerning pulmonary  nodules.    MEDIASTINUM/AXILLAE: Small hiatal hernia. Esophageal varices. No adenopathy. No pericardial effusion. Right portacatheter with tip in the right atrium. No thoracic aortic aneurysm.    CORONARY ARTERY CALCIFICATION: Mild.    UPPER ABDOMEN: Gallstones within the gallbladder. Splenomegaly. Cirrhotic appearing liver. Ascites adjacent to the liver and spleen.     MUSCULOSKELETAL: Mild to moderate thoracic spondylosis. Posterior oliver fixation lower thoracic and visualized upper lumbar spine      Impression    IMPRESSION:  1.  Negative for pulmonary embolism.  2.  No acute findings in the chest.  3.  Cirrhotic appearing liver with splenomegaly, esophageal varices, and ascites.  4.  Cholelithiasis.

## 2024-08-20 NOTE — PROGRESS NOTES
[facility]    Progress Note - Medicine Service, MAROON TEAM 4       Date of Admission:  8/19/2024    Assessment & Plan   Gucci Logan is a 74 year old male admitted on 8/19/2024. He has a history of metastatic prostate cancer complicated by bone metastases and left femur fracture s/p repair, cirrhosis secondary to chronic hepatitis C infection, pancytopenia, hypertension, and SHONDA is admitted for ACS rule out. Found to have HCM without evidence of LVOT obstruction. HIDA scan with normal EF, making cholecystitis less likely.      Today  - TTE: HCM with systolic anterior motion of mitral valve  - trop leak thought secondary to microvascular dysfunction from hypertrophy  - started on ranolazine BID  - appreciate cards recs  - HIDA scan with normal EF (68%), making cholecystitis or biliary colic unlikely  - continue darolutamide per oncology  - continue to monitor pancytopenia  - pharmacy consult for med rec     #Chest pain  # Troponin leak  #Coronary artery disease  #Hypertension  The patient reports having substernal chest pain at rest that improved with nitroglycerin and aspirin. On admission, ECG was negative for ST changes and showed sinus rhythm with left anterior fascicular block and troponin was elevated and trended from 51 to 57 to 63. HEART score is 6. Last echocardiogram 12/04/23 with EF>70%  and concern for hypertrophic cardiomyopathy. Last stress test on 9/12/23 was negative for inducible myocardial ischemia or infarction. We will plan on ordering an echocardiogram and consulting cardiology for further evaluation.  - TTE: HCM with systolic anterior motion of mitral valve  - Cardiology consult   - trop leak thought secondary to microvascular dysfunction from hypertrophy  - started on ranolazine BID 8/20  - Consider genetic testing for himself and children   - PTA aspirin 81mg daily  - PTA metoprolol 150mg daily   - PTA valsartan 160mg daily  -Pharmacy  consult for med rec     #Right upper quadrant abdominal pain  The patient also reports having right upper quadrant abdominal pain. RUQ US with mild wall thickening of the gallbladder. Surgery was consulted and recommended the patient receive a HIDA scan for further evaluation.  - HIDA scan with normal EF (68%), making cholecystitis or biliary colic unlikely  - General surgery following, appreciate recommendations       #Metastatic prostate cancer  #Pathologic fracture of left femur secondary to metastatic adenocarcinoma  The patient was recently diagnosed with prostate cancer and a pathologic fracture of his left femur and was recently started on darolutamide therapy.  Discussed with oncology and okay to restart.  - pta darolutamide 600mg twice daily  - PTA prednisone 5mg daily  - Maintain toe-touch weightbearing left lower extremity     # Pancytopenia  Neutropenia likely from bone marrow suppression, ANC on admission 1.  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  - Daily CMP      #GERD   - PTA pantoprazole 40mg daily          Diet: Combination Diet Regular Diet Adult    DVT Prophylaxis: VTE Prophylaxis contraindicated due to thrombocytopenia  Rankin Catheter: Not present  Fluids: none  Lines: PRESENT             Cardiac Monitoring: ACTIVE order. Indication: Chest pain/ ACS rule out (24 hours)  Code Status: Full Code      Clinically Significant Risk Factors Present on Admission            # Hypomagnesemia: Lowest Mg = 1.6 mg/dL in last 2 days, will replace as needed   # Hypoalbuminemia: Lowest albumin = 2.7 g/dL at 8/20/2024  6:06 AM, will monitor as appropriate  #  "Coagulation Defect: INR = 1.32 (Ref range: 0.85 - 1.15) and/or PTT = N/A, will monitor for bleeding  # Drug Induced Platelet Defect: home medication list includes an antiplatelet medication   # Hypertension: Noted on problem list    # Anemia: based on hgb <11       # Obesity: Estimated body mass index is 33.15 kg/m  as calculated from the following:    Height as of this encounter: 1.778 m (5' 10\").    Weight as of this encounter: 104.8 kg (231 lb).       # Financial/Environmental Concerns:                 Disposition Plan      Expected Discharge Date: 08/22/2024                The patient's care was discussed with the Attending Physician, Dr. Hernández .    Sahil Sher MD  Medicine Service, [address]  Securely message with At[employer] (more info)  Text page via [facility] Paging/Directory   See signed in provider for up to date coverage information  ______________________________________________________________________    Interval History   NAEON.  Denies any symptoms today.     Physical Exam   Vital Signs: Temp: 98.4  F (36.9  C) Temp src: Oral BP: 132/65 (MAP: 83) Pulse: 65   Resp: 18 SpO2: 98 % O2 Device: None (Room air) Oxygen Delivery: 1 LPM  Weight: 231 lbs 0 oz    GEN: alert, comfortable, NAD  HEENT: EOMI,  anicteric sclera  CV: RRR, normal S1 S2, no m/g/r  RESP: lungs clear to auscultation - no rales, rhonchi or wheezes  ABDOMEN:  soft, nontender, nondistended, +BS  MSK/SKIN: no edema, no rash.  NEURO: Alert and oriented, moving all limbs spontaneously      Medical Decision Making       Please see A&P for additional details of medical decision making.      Data     I have personally reviewed the following data over the past 24 hrs:    1.4 (L)  \   8.3 (L)   / 41 (LL)     140 109 (H) 19.4 /  145 (H)   4.1 25 0.95 \     ALT: 18 AST: 28 AP: 120 TBILI: 1.1   ALB: 2.7 (L) TOT PROTEIN: 5.0 (L) LIPASE: N/A     Trop: 63 (H) BNP: 4,816 (H)       Imaging results " reviewed over the past 24 hrs:   Recent Results (from the past 24 hour(s))   CT Chest Pulmonary Embolism w Contrast    Narrative    EXAM: CT CHEST PULMONARY EMBOLISM W CONTRAST  LOCATION: [facility]  DATE: 2024    INDICATION: chest pain, sob, hx cancer  COMPARISON: None.  TECHNIQUE: CT chest pulmonary angiogram during arterial phase injection of IV contrast. Multiplanar reformats and MIP reconstructions were performed. Dose reduction techniques were used.   CONTRAST: iopamidol (ISOVUE 370) solution 73 mL    FINDINGS:  ANGIOGRAM CHEST: Pulmonary arteries are normal caliber and negative for pulmonary emboli. Thoracic aorta is negative for dissection. No CT evidence of right heart strain.    LUNGS AND PLEURA: Dependent atelectasis in the posterior lung bases. No pleural effusions. No focal airspace consolidations or concerning pulmonary nodules.    MEDIASTINUM/AXILLAE: Small hiatal hernia. Esophageal varices. No adenopathy. No pericardial effusion. Right portacatheter with tip in the right atrium. No thoracic aortic aneurysm.    CORONARY ARTERY CALCIFICATION: Mild.    UPPER ABDOMEN: Gallstones within the gallbladder. Splenomegaly. Cirrhotic appearing liver. Ascites adjacent to the liver and spleen.     MUSCULOSKELETAL: Mild to moderate thoracic spondylosis. Posterior oliver fixation lower thoracic and visualized upper lumbar spine      Impression    IMPRESSION:  1.  Negative for pulmonary embolism.  2.  No acute findings in the chest.  3.  Cirrhotic appearing liver with splenomegaly, esophageal varices, and ascites.  4.  Cholelithiasis.     Echocardiogram Complete   Result Value    LVEF  60-65%    Narrative    [id number]  [id number]  [id number]  [id number]^CAMILO^RODRIGO^ENA     [facility]  Echocardiography Laboratory  [address] [place] [id number]     Name: LAURENT PEREIRA  MRN: [id number]  : 1950  Study Date:  08/20/2024 08:23 AM  Age: 74 yrs  Gender: Male  Patient Location: [facility]  Reason For Study: Chest Pain, Abnormal Heart Sound  Ordering Physician: RODRIGO PAGE  Performed By: Leia Garcia JANIE     BSA: 2.2 m2  Height: 70 in  Weight: 231 lb  HR: 66  BP: 131/65 mmHg  ______________________________________________________________________________  Procedure  Complete Portable Echo Adult.  ______________________________________________________________________________  Interpretation Summary  Known hypertrophic cardiomyopathy with asymmetric septal hypertrophy  phenotype.  Global and regional left ventricular function is normal with an EF of 60-65%.  There is asymmetric septal hypertrophy. The maximal wall thickness is 2.6 cm  in the basal anteroseptal segment. There is a resting LVOT gradient of 19 mmHg  that increases to 72 mmHg with Valsalva.  Global right ventricular function is normal. The right ventricle is normal  size.  There is systolic anterior motion of the mitral valve without septal contact.  Mild mitral regurgitation.  IVC diameter <2.1 cm collapsing >50% with sniff suggests a normal RA pressure  of 3 mmHg.  This study was compared with the study from 12/04/2023. No significant changes  noted.  ______________________________________________________________________________  Left Ventricle  Global and regional left ventricular function is normal with an EF of 60-65%.  Left ventricular cavity size is small. There is asymmetric septal hypertrophy.  The maximal wall thickness is 2.6 cm in the basal anteroseptal segment. There  is a resting LVOT gradient of 19 mmHg that increases to 72 mmHg with Valsalva.     Right Ventricle  Global right ventricular function is normal. The right ventricle is normal  size.     Atria  The right atria appears normal. Severe left atrial enlargement is present.     Mitral Valve  There is systolic anterior motion of the mitral valve without septal contact.  Mild mitral  insufficiency is present.     Aortic Valve  The aortic valve is tricuspid. Mild aortic valve calcification is present.  Trace aortic insufficiency is present.     Tricuspid Valve  The tricuspid valve is normal. Trace tricuspid insufficiency is present.  Pulmonary artery systolic pressure cannot be assessed.     Pulmonic Valve  The valve leaflets are not well visualized. Trace pulmonic insufficiency is  present.     Vessels  The aorta root is normal. The thoracic aorta is normal. Both are normal when  indexed to BSA. IVC diameter <2.1 cm collapsing >50% with sniff suggests a  normal RA pressure of 3 mmHg.     Pericardium  No pericardial effusion is present.     Miscellaneous  A left pleural effusion is present.     Compared to Previous Study  This study was compared with the study from 2023 . No significant  changes noted.  ______________________________________________________________________________  MMode/2D Measurements & Calculations  IVSd: 2.6 cm  LVIDd: 4.6 cm  LVIDs: 2.8 cm  LVPWd: 1.3 cm  FS: 39.6 %  LV mass(C)d: 425.9 grams  LV mass(C)dI: 191.9 grams/m2  Ao root diam: 3.5 cm  asc Aorta Diam: 3.8 cm  LVOT diam: 2.3 cm  LVOT area: 4.3 cm2  Ao root diam index Ht(cm/m): 2.0  Ao root diam index BSA (cm/m2): 1.6  Asc Ao diam index BSA (cm/m2): 1.7  Asc Ao diam index Ht(cm/m): 2.1  LA Volume (BP): 123.0 ml     LA Volume Index (BP): 55.4 ml/m2  RV Base: 3.6 cm  RWT: 0.58  TAPSE: 2.1 cm     Doppler Measurements & Calculations  MV E max dudley: 105.0 cm/sec  MV A max dudley: 110.0 cm/sec  MV E/A: 0.95  MV max P.7 mmHg  MV mean PG: 3.0 mmHg  MV V2 VTI: 40.7 cm  MVA(VTI): 3.7 cm2  MV P1/2t max dudley: 124.8 cm/sec  MV P1/2t: 105.8 msec  MVA(P1/2t): 2.1 cm2  MV dec slope: 345.4 cm/sec2  MV dec time: 0.35 sec  LV V1 max P.4 mmHg  LV V1 max: 153.6 cm/sec  LV V1 VTI: 35.1 cm  SV(LVOT): 149.9 ml  SI(LVOT): 67.5 ml/m2     PA acc time: 0.10 sec  E/E' av.4  Lateral E/e': 16.9  Medial E/e': 21.9  RV S Dudley: 16.2  cm/sec     ______________________________________________________________________________  Report approved by: Madhuri Avitia 08/20/2024 09:59 AM         NM Hepatobiliary Scan with GB EF and/or Pharm    Narrative    Examination: NM HEPATOBILIARY SCAN WITH GB EF AND/OR PHARM      Date: 8/20/2024 12:07 PM.    Indication: ruq pain     Additional Information: none    Technique:    The patient received 4.7 mCi of Tc-99m Choletec intravenously. Images  were obtained out through 60 minutes. The patient received 28 grams of  fat to stimulate gall bladder contraction. An additional 45 minutes of  images were obtained after the gall bladder contraction intervention.    Findings:    There is prompt clearance of the radionuclide from the blood pool into  the liver. By 15 minutes there is clear visualization of the  intrahepatic ducts as well as the upper common bile duct. By 15  minutes there is visualization of the gallbladder. At 60 minutes there  is emptying from the common bile duct into the small bowel.    After fatty meal administration, the gallbladder ejection fraction was  measured at 68%.     Enterogastric reflux was not present.      Impression    Impression:    1. Scintigraphic finding of patent cystic duct.  2. Gallbladder ejection fraction at 68%.     =======================    The normal gallbladder ejection fraction for a 45 minute infusion is  >40%    PERNELL UMANZOR MD         SYSTEM ID:  [id number]

## 2024-08-20 NOTE — PROGRESS NOTES
"BRIEF EMERGENCY GENERAL SURGERY PROGRESS NOTE    Patient's pain improving on morning evaluation. HIDA scan reviewed showing patent cystic duct with gallbladder ejection fraction of 68%. It is unlikely that his pain is secondary to biliary pathology. EGS will sign off at this time, please call with questions or concerns.     Vital signs:  Temp: 98.4  F (36.9  C) Temp src: Oral BP: 132/65 (MAP: 83) Pulse: 65   Resp: 18 SpO2: 98 % O2 Device: None (Room air) Oxygen Delivery: 1 LPM Height: 177.8 cm (5' 10\") Weight: 104.8 kg (231 lb)  Estimated body mass index is 33.15 kg/m  as calculated from the following:    Height as of this encounter: 1.778 m (5' 10\").    Weight as of this encounter: 104.8 kg (231 lb).    Anshu Lewis MD  PGY-2 Surgery  "

## 2024-08-21 ENCOUNTER — TELEPHONE (OUTPATIENT)
Dept: CARDIOLOGY | Facility: CLINIC | Age: 74
End: 2024-08-21
Payer: COMMERCIAL

## 2024-08-21 ENCOUNTER — MEDICAL CORRESPONDENCE (OUTPATIENT)
Dept: HEALTH INFORMATION MANAGEMENT | Facility: CLINIC | Age: 74
End: 2024-08-21
Payer: COMMERCIAL

## 2024-08-21 VITALS
BODY MASS INDEX: 33.07 KG/M2 | WEIGHT: 231 LBS | HEART RATE: 86 BPM | SYSTOLIC BLOOD PRESSURE: 129 MMHG | RESPIRATION RATE: 18 BRPM | DIASTOLIC BLOOD PRESSURE: 53 MMHG | OXYGEN SATURATION: 95 % | TEMPERATURE: 98.3 F | HEIGHT: 70 IN

## 2024-08-21 DIAGNOSIS — I10 HYPERTENSION GOAL BP (BLOOD PRESSURE) < 140/90: ICD-10-CM

## 2024-08-21 DIAGNOSIS — I51.7 LEFT VENTRICULAR HYPERTROPHY: Primary | ICD-10-CM

## 2024-08-21 DIAGNOSIS — R06.09 DOE (DYSPNEA ON EXERTION): ICD-10-CM

## 2024-08-21 LAB
ANION GAP SERPL CALCULATED.3IONS-SCNC: 6 MMOL/L (ref 7–15)
BASOPHILS # BLD AUTO: ABNORMAL 10*3/UL
BASOPHILS # BLD MANUAL: 0 10E3/UL (ref 0–0.2)
BASOPHILS NFR BLD AUTO: ABNORMAL %
BASOPHILS NFR BLD MANUAL: 0 %
BUN SERPL-MCNC: 20.7 MG/DL (ref 8–23)
CALCIUM SERPL-MCNC: 7.9 MG/DL (ref 8.8–10.4)
CHLORIDE SERPL-SCNC: 109 MMOL/L (ref 98–107)
CREAT SERPL-MCNC: 1.04 MG/DL (ref 0.67–1.17)
EGFRCR SERPLBLD CKD-EPI 2021: 76 ML/MIN/1.73M2
ELLIPTOCYTES BLD QL SMEAR: SLIGHT
EOSINOPHIL # BLD AUTO: ABNORMAL 10*3/UL
EOSINOPHIL # BLD MANUAL: 0 10E3/UL (ref 0–0.7)
EOSINOPHIL NFR BLD AUTO: ABNORMAL %
EOSINOPHIL NFR BLD MANUAL: 1 %
ERYTHROCYTE [DISTWIDTH] IN BLOOD BY AUTOMATED COUNT: 15.1 % (ref 10–15)
GLUCOSE SERPL-MCNC: 142 MG/DL (ref 70–99)
HCO3 SERPL-SCNC: 25 MMOL/L (ref 22–29)
HCT VFR BLD AUTO: 29.6 % (ref 40–53)
HGB BLD-MCNC: 9.1 G/DL (ref 13.3–17.7)
IMM GRANULOCYTES # BLD: ABNORMAL 10*3/UL
IMM GRANULOCYTES NFR BLD: ABNORMAL %
LYMPHOCYTES # BLD AUTO: ABNORMAL 10*3/UL
LYMPHOCYTES # BLD MANUAL: 0.3 10E3/UL (ref 0.8–5.3)
LYMPHOCYTES NFR BLD AUTO: ABNORMAL %
LYMPHOCYTES NFR BLD MANUAL: 10 %
MAGNESIUM SERPL-MCNC: 1.8 MG/DL (ref 1.7–2.3)
MCH RBC QN AUTO: 29.6 PG (ref 26.5–33)
MCHC RBC AUTO-ENTMCNC: 30.7 G/DL (ref 31.5–36.5)
MCV RBC AUTO: 96 FL (ref 78–100)
MONOCYTES # BLD AUTO: ABNORMAL 10*3/UL
MONOCYTES # BLD MANUAL: 0.2 10E3/UL (ref 0–1.3)
MONOCYTES NFR BLD AUTO: ABNORMAL %
MONOCYTES NFR BLD MANUAL: 7 %
MYELOCYTES # BLD MANUAL: 0.2 10E3/UL
MYELOCYTES NFR BLD MANUAL: 6 %
NEUTROPHILS # BLD AUTO: ABNORMAL 10*3/UL
NEUTROPHILS # BLD MANUAL: 2.1 10E3/UL (ref 1.6–8.3)
NEUTROPHILS NFR BLD AUTO: ABNORMAL %
NEUTROPHILS NFR BLD MANUAL: 76 %
NRBC # BLD AUTO: 0 10E3/UL
NRBC # BLD AUTO: 0 10E3/UL
NRBC BLD AUTO-RTO: 0 /100
NRBC BLD MANUAL-RTO: 1 %
PLAT MORPH BLD: ABNORMAL
PLATELET # BLD AUTO: 45 10E3/UL (ref 150–450)
POLYCHROMASIA BLD QL SMEAR: SLIGHT
POTASSIUM SERPL-SCNC: 4.2 MMOL/L (ref 3.4–5.3)
RBC # BLD AUTO: 3.07 10E6/UL (ref 4.4–5.9)
RBC MORPH BLD: ABNORMAL
SODIUM SERPL-SCNC: 140 MMOL/L (ref 135–145)
TARGETS BLD QL SMEAR: SLIGHT
WBC # BLD AUTO: 2.7 10E3/UL (ref 4–11)

## 2024-08-21 PROCEDURE — 83735 ASSAY OF MAGNESIUM: CPT | Performed by: STUDENT IN AN ORGANIZED HEALTH CARE EDUCATION/TRAINING PROGRAM

## 2024-08-21 PROCEDURE — 250N000011 HC RX IP 250 OP 636: Performed by: STUDENT IN AN ORGANIZED HEALTH CARE EDUCATION/TRAINING PROGRAM

## 2024-08-21 PROCEDURE — 36591 DRAW BLOOD OFF VENOUS DEVICE: CPT | Performed by: STUDENT IN AN ORGANIZED HEALTH CARE EDUCATION/TRAINING PROGRAM

## 2024-08-21 PROCEDURE — 85007 BL SMEAR W/DIFF WBC COUNT: CPT | Performed by: STUDENT IN AN ORGANIZED HEALTH CARE EDUCATION/TRAINING PROGRAM

## 2024-08-21 PROCEDURE — 250N000012 HC RX MED GY IP 250 OP 636 PS 637

## 2024-08-21 PROCEDURE — 250N000013 HC RX MED GY IP 250 OP 250 PS 637: Performed by: STUDENT IN AN ORGANIZED HEALTH CARE EDUCATION/TRAINING PROGRAM

## 2024-08-21 PROCEDURE — 85014 HEMATOCRIT: CPT | Performed by: STUDENT IN AN ORGANIZED HEALTH CARE EDUCATION/TRAINING PROGRAM

## 2024-08-21 PROCEDURE — 250N000013 HC RX MED GY IP 250 OP 250 PS 637

## 2024-08-21 PROCEDURE — 99239 HOSP IP/OBS DSCHRG MGMT >30: CPT | Mod: GC | Performed by: STUDENT IN AN ORGANIZED HEALTH CARE EDUCATION/TRAINING PROGRAM

## 2024-08-21 PROCEDURE — 80048 BASIC METABOLIC PNL TOTAL CA: CPT | Performed by: STUDENT IN AN ORGANIZED HEALTH CARE EDUCATION/TRAINING PROGRAM

## 2024-08-21 PROCEDURE — 250N000011 HC RX IP 250 OP 636

## 2024-08-21 RX ORDER — HEPARIN SODIUM,PORCINE 10 UNIT/ML
5-10 VIAL (ML) INTRAVENOUS EVERY 24 HOURS
Status: DISCONTINUED | OUTPATIENT
Start: 2024-08-21 | End: 2024-08-21 | Stop reason: HOSPADM

## 2024-08-21 RX ORDER — METHOCARBAMOL 500 MG/1
TABLET, FILM COATED ORAL
COMMUNITY
Start: 2024-08-12

## 2024-08-21 RX ORDER — RANOLAZINE 500 MG/1
500 TABLET, EXTENDED RELEASE ORAL 2 TIMES DAILY
Qty: 60 TABLET | Refills: 3 | Status: SHIPPED | OUTPATIENT
Start: 2024-08-21 | End: 2024-09-24

## 2024-08-21 RX ORDER — HEPARIN SODIUM,PORCINE 10 UNIT/ML
5-10 VIAL (ML) INTRAVENOUS
Status: DISCONTINUED | OUTPATIENT
Start: 2024-08-21 | End: 2024-08-21 | Stop reason: HOSPADM

## 2024-08-21 RX ORDER — MAGNESIUM SULFATE 1 G/100ML
1 INJECTION INTRAVENOUS ONCE
Status: COMPLETED | OUTPATIENT
Start: 2024-08-21 | End: 2024-08-21

## 2024-08-21 RX ORDER — HEPARIN SODIUM (PORCINE) LOCK FLUSH IV SOLN 100 UNIT/ML 100 UNIT/ML
5-10 SOLUTION INTRAVENOUS
Status: DISCONTINUED | OUTPATIENT
Start: 2024-08-21 | End: 2024-08-21 | Stop reason: HOSPADM

## 2024-08-21 RX ORDER — NITROGLYCERIN 0.4 MG/1
TABLET SUBLINGUAL
Qty: 30 TABLET | Refills: 0 | Status: SHIPPED | OUTPATIENT
Start: 2024-08-21

## 2024-08-21 RX ADMIN — MAGNESIUM SULFATE HEPTAHYDRATE 1 G: 1 INJECTION, SOLUTION INTRAVENOUS at 09:51

## 2024-08-21 RX ADMIN — VALSARTAN 160 MG: 160 TABLET, FILM COATED ORAL at 08:50

## 2024-08-21 RX ADMIN — PANTOPRAZOLE SODIUM 40 MG: 40 TABLET, DELAYED RELEASE ORAL at 08:50

## 2024-08-21 RX ADMIN — ASPIRIN 81 MG: 81 TABLET ORAL at 08:50

## 2024-08-21 RX ADMIN — HEPARIN 5 ML: 100 SYRINGE at 11:21

## 2024-08-21 RX ADMIN — RANOLAZINE 500 MG: 500 TABLET, FILM COATED, EXTENDED RELEASE ORAL at 08:50

## 2024-08-21 RX ADMIN — DAROLUTAMIDE 600 MG: 300 TABLET, FILM COATED ORAL at 08:58

## 2024-08-21 RX ADMIN — METOPROLOL SUCCINATE 150 MG: 50 TABLET, EXTENDED RELEASE ORAL at 08:50

## 2024-08-21 RX ADMIN — PREDNISONE 5 MG: 5 TABLET ORAL at 08:50

## 2024-08-21 ASSESSMENT — ACTIVITIES OF DAILY LIVING (ADL)
ADLS_ACUITY_SCORE: 47
ADLS_ACUITY_SCORE: 45
ADLS_ACUITY_SCORE: 47
ADLS_ACUITY_SCORE: 47
DEPENDENT_IADLS:: TRANSPORTATION
ADLS_ACUITY_SCORE: 47
ADLS_ACUITY_SCORE: 47

## 2024-08-21 NOTE — MEDICATION SCRIBE - ADMISSION MEDICATION HISTORY
Medication Scribe Admission Medication History    Admission medication history is complete. The information provided in this note is only as accurate as the sources available at the time of the update.    Information Source(s): Patient via in-person    Pertinent Information: Spoke with patient in person and completed medication hx.   Patient states that Zofran and Compazine are newly prescribed medications.  Patient states that he completed his course of Maxirol 0.1% Ophthalmic Susp.     Changes made to PTA medication list:  Added: Methocarbamol 500 MG Tab  Deleted: Dexamethasone 4 MG Tab           Lidocaine - Prilocaine 2.5-2.5% External Cream           Maxitrol 0.1% Ophthalmic Susp.            Tizanidine 4 MG Tab (Duplicate)   Changed: None    Allergies reviewed with patient and updates made in EHR: no    Medication History Completed By: Hermelinda Caceres 8/21/2024 11:26 AM    PTA Med List   Medication Sig Last Dose    acetaminophen (TYLENOL) 325 MG tablet Take 2 tablets (650 mg) by mouth every 4 hours as needed for other (For optimal non-opioid multimodal pain management to improve pain control.) 8/18/2024    aspirin 81 MG EC tablet Take 1 tablet (81 mg) by mouth daily 8/18/2024    calcium citrate (CITRACAL) 950 (200 Ca) MG tablet Take 1 tablet (950 mg) by mouth daily 8/18/2024    darolutamide (NUBEQA) 300 MG tablet Take 2 tablets (600 mg) by mouth 2 times daily for 21 days . Swallow tablets whole. Take with food. Avoid grapefruit or grapefruit juice. 8/18/2024    diclofenac (VOLTAREN) 1 % topical gel Apply 4 g topically 3 times daily as needed for moderate pain (bursitis) Past Week    metFORMIN (GLUCOPHAGE) 500 MG tablet Take 1 tablet (500 mg) by mouth 2 times daily (with meals) (Patient taking differently: Take 1,000 mg by mouth daily (with breakfast).) 8/21/2024    methocarbamol (ROBAXIN) 500 MG tablet Take 1 Tablet (500 mg) by mouth every 6 hours if needed for Muscle Spasm PO 1st choice. 8/19/2024    metoprolol  succinate ER (TOPROL XL) 100 MG 24 hr tablet 1 1/2 ( 150 mg ) po daily 8/18/2024    multivitamin w/minerals (THERA-VIT-M) tablet Take 1 tablet by mouth daily 8/18/2024    nitroGLYcerin (NITROSTAT) 0.4 MG sublingual tablet For chest pain place 1 tablet under the tongue every 5 minutes for 3 doses. If symptoms persist 5 minutes after 1st dose call 911.     omeprazole (PRILOSEC) 20 MG DR capsule TAKE 1 CAPSULE BY MOUTH ONCE DAILY 30 TO 60 MINUTES BEFORE A MEAL 8/18/2024    ondansetron (ZOFRAN) 8 MG tablet Take 1 tablet (8 mg) by mouth every 8 hours as needed for nausea Unknown at NEW MEDICATION    oxyCODONE (ROXICODONE) 5 MG tablet Take 1-2 tablets (5-10 mg) by mouth every 4 hours as needed for pain 8/18/2024    predniSONE (DELTASONE) 5 MG tablet Take 1 tablet (5 mg) by mouth daily (with breakfast) Do not take prednisone on days when taking dexamethasone. 8/18/2024    prochlorperazine (COMPAZINE) 10 MG tablet Take 0.5 tablets (5 mg) by mouth every 6 hours as needed for nausea or vomiting Unknown at NEW MEDICATION    ranolazine (RANEXA) 500 MG 12 hr tablet Take 1 tablet (500 mg) by mouth 2 times daily. DO NOT CRUSH.     senna-docusate (SENOKOT-S/PERICOLACE) 8.6-50 MG tablet Take 1 tablet by mouth 2 times daily as needed for constipation Past Week    tiZANidine (ZANAFLEX) 4 MG tablet TAKE 1/2 (ONE-HALF) TO 1  TABLET BY MOUTH UP TO THREE TIMES DAILY AS NEEDED (Patient taking differently: Take 2-4 mg by mouth 3 times daily as needed for muscle spasms. Usually takes at 4pm with tramadol) 8/18/2024    valsartan (DIOVAN) 160 MG tablet Take 1 tablet (160 mg) by mouth daily 8/18/2024    vitamin D3 (CHOLECALCIFEROL) 50 mcg (2000 units) tablet Take 1 tablet (50 mcg) by mouth daily 8/18/2024

## 2024-08-21 NOTE — DISCHARGE SUMMARY
Pt. discharged at 1330 to home, was accompanied by friend, and left with personal belongings. Pt. received complete discharge paperwork and received all medications as filled by discharge pharmacy. Pt. was given times of last dose for all discharge medications in writing on discharge medication sheets. Discharge teaching included nitroglycerin education, medication, pain management, activity restrictions, dressing changes, and signs and symptoms of infection. Pt. to follow up with cardiology in future. Pt. had no further questions at the time of discharge and no unmet needs were identified.

## 2024-08-21 NOTE — CONSULTS
Care Management Initial Consult    General Information  Assessment completed with: Patient, VM-chart review,    Type of CM/SW Visit: CM Role Introduction    Primary Care Provider verified and updated as needed: Yes (Richi Jaramillo [phone] [id number] [facility] [id number])   Readmission within the last 30 days: no previous admission in last 30 days      Reason for Consult: discharge planning, utilization management concerns (elevated readmission risk score/discharge transportation)  Advance Care Planning: Advance Care Planning Reviewed: present on chart (Has POLST on file but hasn't appointed an HCA)  Has POLST on file but hasn't appointed an HCA     Communication Assessment  Patient's communication style: spoken language (English or Bilingual)         Cognitive  Cognitive/Neuro/Behavioral: WDL                      Living Environment:   People in home: alone     Current living Arrangements: condominium, apartment      Able to return to prior arrangements: yes     Family/Social Support:  Care provided by: self, friend  Provides care for: no one  Marital Status: Single  Friend          Description of Support System: Supportive, Involved    Support Assessment: Adequate family and caregiver support    Current Resources:   Patient receiving home care services: Yes  Skilled Home Care Services: Skilled Nursing, Physical Therapy, Speech Therapy ([facility], [phone])  Community Resources: Meals on Wheels, Home Care, OP Infusion, DME ([facility], [phone];  [facility]; [facility])  Equipment currently used at home: wheelchair, manual, cane, straight, tub bench; commode chair  Supplies currently used at home: None    Employment/Financial:  Employment Status: retired        Financial Concerns: other (see comments) (Rising medical costs due to health condition)   Referral to Financial Worker: No     Does the patient's insurance plan have a 3 day qualifying hospital  stay waiver?  Yes     Which insurance plan 3 day waiver is available? Alternative insurance waiver    Will the waiver be used for post-acute placement? No    Lifestyle & Psychosocial Needs:  Social Determinants of Health     Food Insecurity: No Food Insecurity (8/6/2024)    Received from [facility]    Food Insecurity     Worried About Running Out of Food in the Last Year: 1   Depression: Not at risk (1/5/2024)    PHQ-2     PHQ-2 Score: 0   Housing Stability: Low Risk  (8/6/2024)    [facility]    Housing Stability     Unable to Pay for Housing in the Last Year: 1   Tobacco Use: Medium Risk (8/15/2024)    Patient History     Smoking Tobacco Use: Former     Smokeless Tobacco Use: Never     Passive Exposure: Never   Financial Resource Strain: Low Risk  (8/6/2024)    [facility]    Financial Resource Strain     Difficulty of Paying Living Expenses: 3     Difficulty of Paying Living Expenses: Not on file   Alcohol Use: Not on file   Transportation Needs: No Transportation Needs (8/6/2024)    [facility]    Transportation Needs     Lack of Transportation (Medical): 1   Physical Activity: Not on file   Interpersonal Safety: Low Risk  (4/30/2024)    Interpersonal Safety     Do you feel physically and emotionally safe where you currently live?: Yes     Within the past 12 months, have you been hit, slapped, kicked or otherwise physically hurt by someone?: No     Within the past 12 months, have you been humiliated or emotionally abused in other ways by your partner or ex-partner?: No   Stress: Not on file   Social Connections: Socially Integrated (8/6/2024)    Received from [facility]    Social Connections     Frequency of Communication with Friends and Family: 0   Health Literacy: Not on file     Functional  "Status:  Prior to admission patient needed assistance:   Dependent ADLs:: Independent, Wheelchair-with assist  Dependent IADLs:: Transportation (Relies on [employer] and friends for transportation)     Mental Health Status:  Mental Health Status: No Current Concerns       Chemical Dependency Status:  Chemical Dependency Status: No Current Concerns             Values/Beliefs:  Spiritual, Cultural Beliefs, Protestant Practices, Values that affect care: no             Additional Information:    Per chart review: \"Gucci Logan (Russ) is a 74 year old male admitted on 8/19/2024. He has a history of metastatic prostate cancer complicated by bone metastases and left femur fracture s/p repair, cirrhosis secondary to chronic hepatitis C infection, thrombocytopenia, hypertension, and SHONDA is admitted for chest pain.\" (Panda Torres MD; 8/19/2024)    Care Management/Social Work Consult placed due to patient's complex medical history and elevated unplanned readmission risk score and for discharge planning. MITUL performed chart review to begin assessment.       1026 Per Voc[employer] conversation with RN Ulises BAUMAN was asked to arrange discharge transportation for pt after [id number] MITUL contacted [facility] [phone], spoke with Nyla to determine if pt has transportation benefits. Pt does not have transportation benefits    1116 MITUL updated RN regarding lack of benefits via itBit    4208 MITUL met with Canelo at bedside to complete assessment and confirm/update information in previous assessment (5/21/2024). MITUL introduced self and explained the reason for the visit. Canelo agreed to speak with MITUL    [place] [facility] with [place] access. (Address [address]; [place], [facility]) Prior to his cancer and hip injury he was independent in his I/ADLs. He currently uses a wheelchair for ambulation. He relies on [employer] and his friend Mac for transportation.    Canelo reported that his " friend Mac may be able to transport him home, but wouldn't be able to contact him until after 1230. He would otherwise require wheelchair transportation and assistance into his home. SW agreed to follow up with pt after he speaks with his friend regarding transportation, No additional questions or concerns were reported. SW provided availability and contact information and excused self from Canelo's bedside.    Per chart review, pt's friend provided discharge transportation.    1400 Discharge orders sent to [facility], [facility] via [employer].    SW will continue to follow as needed.    IVANIA Adler, [facility]  ED/OBS   [facility]  Phone: [phone]  Pager: [phone]  Fax: [phone]     After hours [employer] and After Hours [employer] [place]     On-call pager, [phone], 4:00 pm to midnight

## 2024-08-21 NOTE — PLAN OF CARE
Goal Outcome Evaluation:      Plan of Care Reviewed With: patient    Overall Patient Progress: improvingOverall Patient Progress: improving    Outcome Evaluation: Discharge home with resumption of HHC PT/OT/RN when medically stable  SW will continue to follow as needed.    IVANIA Adler, [facility]  ED/OBS   [facility]  Phone: [phone]  Pager: [phone]  Fax: [phone]     After hours [facility] and After Hours [employer] [place]     On-call pager, [phone], 4:00 pm to midnight

## 2024-08-21 NOTE — DISCHARGE SUMMARY
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    Weight as of this encounter: 104.8 kg (231 lb).       Follow-ups Needed After Discharge   Follow-up Appointments     Adult [facility] Follow-up and recommended labs and tests      Follow up with cardiology, referral placed - they will call you to   schedule (with Dr. Gonsales).  Discuss genetic testing for the hypertrophic cardiomyopathy with them.  Follow up with your oncologist 9/05 (discuss pancytopenia as well - low   blood counts)    Appointments on [place] and/or [facility] (with [facility] or [facility]   provider or service). Call [phone] if you haven't heard regarding   these appointments within 7 days of discharge.            Discharge Disposition   Discharged to home  Condition at discharge: Stable    Hospital Course   Gucci Logan is a 74 year old male admitted on 8/19/2024. He has a history of metastatic prostate cancer complicated by bone metastases and left femur fracture s/p repair, cirrhosis secondary to chronic hepatitis C infection, pancytopenia, hypertension, and SHONDA is admitted for ACS rule out. Found to have HCM without evidence of LVOT obstruction. HIDA scan with normal EF, making cholecystitis " less likely.  The following problems were addressed during his hospitalization:        #Chest pain  #ACS rule out  # Troponin leak 2/2 HCM without LVOT  #Hypertension  The patient reports having substernal chest pain at rest that improved with nitroglycerin and aspirin. On admission, ECG was negative for ST changes and showed sinus rhythm with left anterior fascicular block and troponin was elevated and trended from 51 to 57 to 63. HEART score is 6. Last echocardiogram 12/04/23 with EF>70%  and concern for hypertrophic cardiomyopathy. Last stress test on 9/12/23 was negative for inducible myocardial ischemia or infarction. Work up:  - TTE: HCM with systolic anterior motion of mitral valve, no e/o  LVOT   - Cardiology consult              - trop leak thought secondary to microvascular dysfunction from hypertrophy  - started on ranolazine BID 8/20  - Consider genetic testing for himself and children   - Follow up with cardiology, referral placed (with Dr. Gonsales - he would like to transfer his care).  - PTA aspirin 81mg daily  - PTA metoprolol 150mg daily   - PTA valsartan 160mg daily  -Pharmacy performed a med rec on this admission  - resume home care on discharge (RN, PT, OT)       #Right upper quadrant abdominal pain  #Cholecystitis rule out  The patient also reports having right upper quadrant abdominal pain. RUQ US with mild wall thickening of the gallbladder. Surgery was consulted and recommended the patient receive a HIDA scan for further evaluation.  - HIDA scan with normal EF (68%), making cholecystitis or biliary colic unlikely  - Discuss management of cholelithiasis with PCP        #Metastatic prostate cancer  #Pathologic fracture of left femur secondary to metastatic adenocarcinoma  The patient was recently diagnosed with prostate cancer and a pathologic fracture of his left femur and was recently started on darolutamide therapy.  Discussed with oncology and okay to restart.  - pta darolutamide 600mg  twice daily  - PTA prednisone 5mg daily  - Maintain toe-touch weightbearing left lower extremity   - Follow up with your oncologist 9/05 (discuss pancytopenia as well - low blood counts)    # Pancytopenia  Neutropenia likely from bone marrow suppression, ANC on admission 1.  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  - Daily CMP      #GERD   - PTA pantoprazole 40mg daily         Consultations This Hospital Stay   SURGERY GENERAL ADULT IP CONSULT  CARDIOLOGY GENERAL ADULT IP CONSULT  ONCOLOGY ADULT IP CONSULT  PHARMACY LIAISON FOR MEDICATION COVERAGE CONSULT  PHARMACY LIAISON FOR MEDICATION COVERAGE CONSULT  MEDICATION HISTORY IP PHARMACY CONSULT  CARE MANAGEMENT / SOCIAL WORK IP CONSULT  CARE MANAGEMENT / SOCIAL WORK IP CONSULT    Code Status   Full Code       The patient [facility]  [facility] EMERGENCY DEPARTMENT  [address] [address]  Phone: [phone]  ______________________________________________________________________    Physical Exam   Vital Signs: Temp: 98.3  F (36.8  C) Temp src: Oral BP: 129/53 Pulse: 86   Resp: 18 SpO2: 95 % O2 Device: None (Room air)    Weight: 231 lbs 0 oz  GEN: alert, comfortable, NAD  HEENT: EOMI,  anicteric sclera  CV: RRR, normal S1 S2, no m/g/r  RESP: lungs clear to auscultation - no rales, rhonchi or wheezes  ABDOMEN:  soft, nontender, nondistended, +BS  MSK/SKIN: no edema, no rash.  NEURO: Alert and oriented, moving all limbs spontaneously      Primary Care Physician   Richi Jaramillo    Discharge Orders      Adult Cardiology Marissa Herrera  Referral      Reason for your hospital stay    You were admitted for chest pain caused by hypertrophic cardiomyopathy (HCM). You were started on a new medication (ranolazine) which you tolerated well. You were also found to have gallstones but they are not causing any issues at this time. On discharge:  Follow up with cardiology  Follow up with your oncologist 9/05     Activity    Your activity upon discharge: activity as tolerated     Adult [facility] Follow-up and recommended labs and tests    Follow up with cardiology, referral placed - they will call you to schedule (with Dr. Gonsales).  Discuss genetic testing for the hypertrophic cardiomyopathy with them.  Follow up with your oncologist 9/05 (discuss pancytopenia as well - low blood counts)    Appointments on [place] and/or [facility] (with [facility] or [facility] provider or service). Call [phone] if you haven't heard regarding these appointments within 7 days of discharge.     Resume Home Care Services     Diet    Follow this diet upon discharge: Current Diet:Orders Placed This Encounter      Combination Diet Regular Diet Adult       Significant Results and Procedures   Results for orders placed or performed during the hospital encounter of 08/19/24   XR Chest 2 Views    Narrative    EXAM: XR CHEST 2 VIEWS  8/19/2024 6:43 PM      HISTORY: chest pain    COMPARISON: PET CT 7/3/2024    FINDINGS: Two views of the chest. Right chest wall Port-A-Cath with  tip projecting over the right atrium. Trachea is midline. Normal  cardiomediastinal silhouette. No pleural effusion, consolidation or  pneumothorax. No acute osseous abnormalities. Partially visualized  spinal hardware.      Impression    IMPRESSION: No acute cardiopulmonary findings.    I have personally reviewed the examination and initial interpretation  and I agree with the findings.    SAMUEL QUIÑONES MD         SYSTEM ID:  [id number]   US Abdomen Limited (RUQ)    Narrative    EXAMINATION: US ABDOMEN  LIMITED, 8/19/2024 6:18 PM    COMPARISON: Head CT 7/3/2024. Ultrasound 3/18/2024    HISTORY: ruq pain hx gallstones    TECHNIQUE: The abdomen was scanned in standard fashion with  specialized ultrasound transducer(s) using both grey scale and limited  color Doppler techniques.    FINDINGS:   Fluid: Small volume anechoic ascites    Liver: The liver heterogeneous echotexture, measuring 17.8 cm in  craniocaudal dimension. Surface nodularity of the hepatic capsule.  There is no focal mass. The main portal vein is patent with antegrade  flow towards the liver. Hepatic veins are patent with appropriate flow  towards the IVC.    Gallbladder: Cholelithiasis and sludge within the gallbladder lumen.  Negative sonographic Alcantara sign. Mild wall thickening measuring up to  4 mm. No pericholecystic fluid or hyperemia.    Bile Ducts: Both the intra- and extrahepatic biliary system are of  normal caliber.  The common bile duct measures 5 mm in diameter.    Pancreas: Visualized portions of the head and body of the pancreas are  unremarkable.     Kidney: The right kidney measures 10.7 cm long. There is no  hydronephrosis or hydroureter, no shadowing renal calculi, cystic  lesion or mass. Very ill-defined hypoechoic area about the renal hilum  (image 32 and 33).    Visualized portions of the aorta are normal caliber      Impression    IMPRESSION:   1.  Cholelithiasis and sludge within gallbladder lumen without  definite findings to suggest acute cholecystitis. Mild wall thickening  of the gallbladder is nonspecific.  2.  Cirrhosis with small volume ascites.  3.  Ill-defined hypoechoic area about the renal hilum is favored to be  artifactual. No suspicious findings on recent imaging. Could consider  nonemergent follow-up ultrasound reevaluation.    I have personally reviewed the examination and initial interpretation  and I agree with the findings.    SAMUEL QUIÑONES MD         SYSTEM ID:  [id number]   CT Chest Pulmonary Embolism w  Contrast    Narrative    EXAM: CT CHEST PULMONARY EMBOLISM W CONTRAST  LOCATION: [facility]  DATE: 8/19/2024    INDICATION: chest pain, sob, hx cancer  COMPARISON: None.  TECHNIQUE: CT chest pulmonary angiogram during arterial phase injection of IV contrast. Multiplanar reformats and MIP reconstructions were performed. Dose reduction techniques were used.   CONTRAST: iopamidol (ISOVUE 370) solution 73 mL    FINDINGS:  ANGIOGRAM CHEST: Pulmonary arteries are normal caliber and negative for pulmonary emboli. Thoracic aorta is negative for dissection. No CT evidence of right heart strain.    LUNGS AND PLEURA: Dependent atelectasis in the posterior lung bases. No pleural effusions. No focal airspace consolidations or concerning pulmonary nodules.    MEDIASTINUM/AXILLAE: Small hiatal hernia. Esophageal varices. No adenopathy. No pericardial effusion. Right portacatheter with tip in the right atrium. No thoracic aortic aneurysm.    CORONARY ARTERY CALCIFICATION: Mild.    UPPER ABDOMEN: Gallstones within the gallbladder. Splenomegaly. Cirrhotic appearing liver. Ascites adjacent to the liver and spleen.     MUSCULOSKELETAL: Mild to moderate thoracic spondylosis. Posterior olievr fixation lower thoracic and visualized upper lumbar spine      Impression    IMPRESSION:  1.  Negative for pulmonary embolism.  2.  No acute findings in the chest.  3.  Cirrhotic appearing liver with splenomegaly, esophageal varices, and ascites.  4.  Cholelithiasis.     NM Hepatobiliary Scan with GB EF and/or Pharm    Narrative    Examination: NM HEPATOBILIARY SCAN WITH GB EF AND/OR PHARM      Date: 8/20/2024 12:07 PM.    Indication: ruq pain     Additional Information: none    Technique:    The patient received 4.7 mCi of Tc-99m Choletec intravenously. Images  were obtained out through 60 minutes. The patient received 28 grams of  fat to stimulate gall bladder contraction. An additional 45 minutes  of  images were obtained after the gall bladder contraction intervention.    Findings:    There is prompt clearance of the radionuclide from the blood pool into  the liver. By 15 minutes there is clear visualization of the  intrahepatic ducts as well as the upper common bile duct. By 15  minutes there is visualization of the gallbladder. At 60 minutes there  is emptying from the common bile duct into the small bowel.    After fatty meal administration, the gallbladder ejection fraction was  measured at 68%.     Enterogastric reflux was not present.      Impression    Impression:    1. Scintigraphic finding of patent cystic duct.  2. Gallbladder ejection fraction at 68%.     =======================    The normal gallbladder ejection fraction for a 45 minute infusion is  >40%    PERNELL UMANZOR MD         SYSTEM ID:  [id number]   Echocardiogram Complete     Value    LVEF  60-65%    Narrative    [id number]  [id number]  [id number]  [id number]^CAMILO^RODRIGO^ENA     [facility]  Echocardiography Laboratory  [address] [place] [id number]     Name: LAURENT PEREIRA  MRN: [id number]  : 1950  Study Date: 2024 08:23 AM  Age: 74 yrs  Gender: Male  Patient Location: [facility]  Reason For Study: Chest Pain, Abnormal Heart Sound  Ordering Physician: RODRIGO PAGE  Performed By: Leia Garcia RDCS     BSA: 2.2 m2  Height: 70 in  Weight: 231 lb  HR: 66  BP: 131/65 mmHg  ______________________________________________________________________________  Procedure  Complete Portable Echo Adult.  ______________________________________________________________________________  Interpretation Summary  Known hypertrophic cardiomyopathy with asymmetric septal hypertrophy  phenotype.  Global and regional left ventricular function is normal with an EF of 60-65%.  There is asymmetric septal hypertrophy. The maximal wall thickness is 2.6 cm  in the basal anteroseptal segment. There  is a resting LVOT gradient of 19 mmHg  that increases to 72 mmHg with Valsalva.  Global right ventricular function is normal. The right ventricle is normal  size.  There is systolic anterior motion of the mitral valve without septal contact.  Mild mitral regurgitation.  IVC diameter <2.1 cm collapsing >50% with sniff suggests a normal RA pressure  of 3 mmHg.  This study was compared with the study from 12/04/2023. No significant changes  noted.  ______________________________________________________________________________  Left Ventricle  Global and regional left ventricular function is normal with an EF of 60-65%.  Left ventricular cavity size is small. There is asymmetric septal hypertrophy.  The maximal wall thickness is 2.6 cm in the basal anteroseptal segment. There  is a resting LVOT gradient of 19 mmHg that increases to 72 mmHg with Valsalva.     Right Ventricle  Global right ventricular function is normal. The right ventricle is normal  size.     Atria  The right atria appears normal. Severe left atrial enlargement is present.     Mitral Valve  There is systolic anterior motion of the mitral valve without septal contact.  Mild mitral insufficiency is present.     Aortic Valve  The aortic valve is tricuspid. Mild aortic valve calcification is present.  Trace aortic insufficiency is present.     Tricuspid Valve  The tricuspid valve is normal. Trace tricuspid insufficiency is present.  Pulmonary artery systolic pressure cannot be assessed.     Pulmonic Valve  The valve leaflets are not well visualized. Trace pulmonic insufficiency is  present.     Vessels  The aorta root is normal. The thoracic aorta is normal. Both are normal when  indexed to BSA. IVC diameter <2.1 cm collapsing >50% with sniff suggests a  normal RA pressure of 3 mmHg.     Pericardium  No pericardial effusion is present.     Miscellaneous  A left pleural effusion is present.     Compared to Previous Study  This study was compared with the  study from 2023 . No significant  changes noted.  ______________________________________________________________________________  MMode/2D Measurements & Calculations  IVSd: 2.6 cm  LVIDd: 4.6 cm  LVIDs: 2.8 cm  LVPWd: 1.3 cm  FS: 39.6 %  LV mass(C)d: 425.9 grams  LV mass(C)dI: 191.9 grams/m2  Ao root diam: 3.5 cm  asc Aorta Diam: 3.8 cm  LVOT diam: 2.3 cm  LVOT area: 4.3 cm2  Ao root diam index Ht(cm/m): 2.0  Ao root diam index BSA (cm/m2): 1.6  Asc Ao diam index BSA (cm/m2): 1.7  Asc Ao diam index Ht(cm/m): 2.1  LA Volume (BP): 123.0 ml     LA Volume Index (BP): 55.4 ml/m2  RV Base: 3.6 cm  RWT: 0.58  TAPSE: 2.1 cm     Doppler Measurements & Calculations  MV E max dudley: 105.0 cm/sec  MV A max dudley: 110.0 cm/sec  MV E/A: 0.95  MV max P.7 mmHg  MV mean PG: 3.0 mmHg  MV V2 VTI: 40.7 cm  MVA(VTI): 3.7 cm2  MV P1/2t max dudley: 124.8 cm/sec  MV P1/2t: 105.8 msec  MVA(P1/2t): 2.1 cm2  MV dec slope: 345.4 cm/sec2  MV dec time: 0.35 sec  LV V1 max P.4 mmHg  LV V1 max: 153.6 cm/sec  LV V1 VTI: 35.1 cm  SV(LVOT): 149.9 ml  SI(LVOT): 67.5 ml/m2     PA acc time: 0.10 sec  E/E' av.4  Lateral E/e': 16.9  Medial E/e': 21.9  RV S Dudley: 16.2 cm/sec     ______________________________________________________________________________  Report approved by: Madhuri Avitia 2024 09:59 AM           *Note: Due to a large number of results and/or encounters for the requested time period, some results have not been displayed. A complete set of results can be found in Results Review.       Discharge Medications   Current Discharge Medication List        START taking these medications    Details   nitroGLYcerin (NITROSTAT) 0.4 MG sublingual tablet For chest pain place 1 tablet under the tongue every 5 minutes for 3 doses. If symptoms persist 5 minutes after 1st dose call 911.  Qty: 30 tablet, Refills: 0    Associated Diagnoses: Left ventricular hypertrophy      ranolazine (RANEXA) 500 MG 12 hr tablet Take 1 tablet (500 mg)  by mouth 2 times daily. DO NOT CRUSH.  Qty: 60 tablet, Refills: 3    Associated Diagnoses: Left ventricular hypertrophy           CONTINUE these medications which have NOT CHANGED    Details   acetaminophen (TYLENOL) 325 MG tablet Take 2 tablets (650 mg) by mouth every 4 hours as needed for other (For optimal non-opioid multimodal pain management to improve pain control.)  Qty: 100 tablet, Refills: 0    Associated Diagnoses: Status post hip surgery      aspirin 81 MG EC tablet Take 1 tablet (81 mg) by mouth daily  Qty: 90 tablet, Refills: 3    Associated Diagnoses: Closed intertrochanteric fracture of left femur with routine healing, subsequent encounter      calcium citrate (CITRACAL) 950 (200 Ca) MG tablet Take 1 tablet (950 mg) by mouth daily  Qty: 120 tablet, Refills: 3    Associated Diagnoses: Prostate cancer (H); Metastasis to bone (H)      darolutamide (NUBEQA) 300 MG tablet Take 2 tablets (600 mg) by mouth 2 times daily for 21 days . Swallow tablets whole. Take with food. Avoid grapefruit or grapefruit juice.  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 multiple sites (H); Metastasis to bone (H); Prostate cancer (H); Encounter for antineoplastic chemotherapy      diclofenac (VOLTAREN) 1 % topical gel Apply 4 g topically 3 times daily as needed for moderate pain (bursitis)  Qty: 150 g, Refills: 1    Associated Diagnoses: Trochanteric bursitis of left hip      metFORMIN (GLUCOPHAGE) 500 MG tablet Take 1 tablet (500 mg) by mouth 2 times daily (with meals)  Qty: 180 tablet, Refills: 1    Associated Diagnoses: Type 2 diabetes mellitus with diabetic autonomic neuropathy, without long-term current use of insulin (H)      methocarbamol (ROBAXIN) 500 MG tablet Take 1 Tablet (500 mg) by mouth every 6 hours if needed for Muscle Spasm PO 1st choice.      metoprolol succinate ER (TOPROL XL) 100 MG 24 hr tablet 1 1/2 ( 150 mg ) po daily  Qty: 135 tablet, Refills: 1     Comments: Patient to call when refill needed  Associated Diagnoses: Hypertension goal BP (blood pressure) < 140/90      multivitamin w/minerals (THERA-VIT-M) tablet Take 1 tablet by mouth daily      omeprazole (PRILOSEC) 20 MG DR capsule TAKE 1 CAPSULE BY MOUTH ONCE DAILY 30 TO 60 MINUTES BEFORE A MEAL  Qty: 90 capsule, Refills: 1    Associated Diagnoses: Gastroesophageal reflux disease without esophagitis      ondansetron (ZOFRAN) 8 MG tablet Take 1 tablet (8 mg) by mouth every 8 hours as needed for nausea  Qty: 60 tablet, Refills: 5    Associated Diagnoses: Prostate cancer (H); Metastasis to bone (H); Malignant neoplasm metastatic to lymph nodes of multiple sites (H)      oxyCODONE (ROXICODONE) 5 MG tablet Take 1-2 tablets (5-10 mg) by mouth every 4 hours as needed for pain  Qty: 70 tablet, Refills: 0    Comments: Fill on or after 8-17-24  Associated Diagnoses: S/p left hip fracture; Prostate cancer metastatic to bone (H)      predniSONE (DELTASONE) 5 MG tablet Take 1 tablet (5 mg) by mouth daily (with breakfast) Do not take prednisone on days when taking dexamethasone.  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 (H)      prochlorperazine (COMPAZINE) 10 MG tablet Take 0.5 tablets (5 mg) by mouth every 6 hours as needed for nausea or vomiting  Qty: 60 tablet, Refills: 5    Associated Diagnoses: Malignant neoplasm metastatic to lymph nodes of multiple sites (H); Metastasis to bone (H); Prostate cancer (H); Encounter for antineoplastic chemotherapy      senna-docusate (SENOKOT-S/PERICOLACE) 8.6-50 MG tablet Take 1 tablet by mouth 2 times daily as needed for constipation  Qty: 30 tablet, Refills: 1    Associated Diagnoses: Status post hip surgery      tiZANidine (ZANAFLEX) 4 MG tablet TAKE 1/2 (ONE-HALF) TO 1  TABLET BY MOUTH UP TO THREE TIMES DAILY AS NEEDED  Qty: 30 tablet, Refills: 0    Associated  Diagnoses: Muscle pain      valsartan (DIOVAN) 160 MG tablet Take 1 tablet (160 mg) by mouth daily  Qty: 90 tablet, Refills: 1    Comments: Patient to call when refill needed  Associated Diagnoses: Hypertension goal BP (blood pressure) < 140/90      vitamin D3 (CHOLECALCIFEROL) 50 mcg (2000 units) tablet Take 1 tablet (50 mcg) by mouth daily  Qty: 120 tablet, Refills: 3    Associated Diagnoses: Prostate cancer (H); Metastasis to bone (H)           Allergies   Allergies   Allergen Reactions    Bee Venom Swelling     Gum swelling    Haemophilus B Polysaccharide Vaccine Other (See Comments)     PN: delirium  fever    Haemophilus Influenzae Nausea and Other (See Comments)     PN: delirium  fever    Other Reaction(s): Fever    PN: delirium  fever   PN: delirium  fever    Pneumococcal Vaccine      Other Reaction(s): *Unknown, adverse reaction

## 2024-08-21 NOTE — TELEPHONE ENCOUNTER
Health Call Center    Phone Message    May a detailed message be left on voicemail: yes     Reason for Call: Appointment Intake    Referring Provider Name: Aretha Hernández MD   Diagnosis and/or Symptoms: follow up on HCM - Dr. Gonsales     [facility]/Genetics    Please assist patient.     Action Taken: Message routed to:  [facility] Cardio    Travel Screening: Not Applicable     Date of Service:

## 2024-08-21 NOTE — PROGRESS NOTES
Cardiology follow-up:  Patient has remained chest pain free, continue ranolazine 500 mg BID and metoprolol succinate 150 mg on discharge. Patient wants to move care here- please provide Cardiology referral on discharge to follow-up in HCM clinic with Dr. Gonsales. Cardiology will sign off at this time. Please call/page with questions.

## 2024-08-22 ENCOUNTER — PATIENT OUTREACH (OUTPATIENT)
Dept: CARE COORDINATION | Facility: CLINIC | Age: 74
End: 2024-08-22
Payer: COMMERCIAL

## 2024-08-22 ENCOUNTER — TELEPHONE (OUTPATIENT)
Dept: FAMILY MEDICINE | Facility: CLINIC | Age: 74
End: 2024-08-22
Payer: COMMERCIAL

## 2024-08-22 ENCOUNTER — TELEPHONE (OUTPATIENT)
Dept: ONCOLOGY | Facility: CLINIC | Age: 74
End: 2024-08-22
Payer: COMMERCIAL

## 2024-08-22 NOTE — PROGRESS NOTES
Connected Care Resource Center: [facility]    Background: Transitional Care Management program identified per system criteria and reviewed by [facility] team for possible outreach.    Assessment: Upon chart review, CCR Team member will not proceed with patient outreach related to this episode of Transitional Care Management program due to reason below:    Patient has active communication with a nurse, provider or care team for reason of post-hospital follow up plan.  Outreach call by CCRC team not indicated to minimize duplicative efforts.     Plan: Transitional Care Management episode addressed appropriately per reason noted above.      NICHOLE Casillas  [facility], [facility]    *Connected Care Resource Team does NOT follow patient ongoing. Referrals are identified based on internal discharge reports and the outreach is to ensure patient has an understanding of their discharge instructions.

## 2024-08-22 NOTE — TELEPHONE ENCOUNTER
Pt calling  Do you want to have a video visit or telephone visit?      He has been hospitalized several time this month, he ended up having to have his right knee opened and debrided.  He had staples placed- about 10.  When he went in to for his follow up he was sent to the ED for some heart abnormalities and never had his knee addressed. He would like for his PCP to see the report regarding his heart.    He has had staples in now for 16 days. Home care may be able to remove with an order    He currently has home health as he is homebound, he will find out if  nurse can remove  Home Health Care - [phone]      Since PCP is out of office, pt should reach out to     Corona Stoddard MD   [address], [address]   [place] [address] [phone] (Work)          CASANDRA Gordon    Triage Nurse  [facility]

## 2024-08-22 NOTE — ORAL ONC MGMT
Oral Chemotherapy Monitoring Program     Placed call to patient in follow up of oral chemotherapy. Left message requesting call back. No drug names were mentioned. Will update when response received.     The purpose of this call was to complete an initial assessment on darolutamide.    Palmira Abdalla, PatienceD  Hematology/Oncology Clinical Pharmacist  [facility]  [phone]

## 2024-08-23 ENCOUNTER — PATIENT OUTREACH (OUTPATIENT)
Dept: ONCOLOGY | Facility: CLINIC | Age: 74
End: 2024-08-23
Payer: COMMERCIAL

## 2024-08-23 NOTE — ORAL ONC MGMT
Oral Chemotherapy Monitoring Program     Placed call to patient in follow up of oral chemotherapy. Left message requesting call back. No drug names were mentioned. Will update when response received.     The purpose of this call was to complete an initial assessment for darolutamide    Palmira Abdalla PharmD  Hematology/Oncology Clinical Pharmacist  [facility]  [phone]

## 2024-08-23 NOTE — PROGRESS NOTES
[facility]: Cancer Care                                                                                          Patient recently discharged from the hospital and care teams have already completed the post-hospital assessment.  No further action.    Piedad Rivas RN  Cancer Care Coordinator  [facility]

## 2024-08-26 ENCOUNTER — PATIENT OUTREACH (OUTPATIENT)
Dept: ONCOLOGY | Facility: CLINIC | Age: 74
End: 2024-08-26
Payer: COMMERCIAL

## 2024-08-26 ENCOUNTER — TELEPHONE (OUTPATIENT)
Dept: ONCOLOGY | Facility: CLINIC | Age: 74
End: 2024-08-26
Payer: COMMERCIAL

## 2024-08-26 NOTE — TELEPHONE ENCOUNTER
Date: 8/26/2024    Time of Call: 9:05 AM     Diagnosis: HCM    [ TORB ] Ordering provider: Dr. Gonsales  Order: Establish care with Dr. Gonsales. All prior testing completed.     Order received by: Zacarias Polk RN      Follow-up/additional notes: Spoke with patient to introduce to [facility] team and discuss proposed appointment and testings needed prior to appointment with Dr. Gonsales. Spoke with [facility] team to rearrange infusion appt scheduled on 9/25 to 9/24 to coordinate appt with Cardiology. Called Gucci back to confirm visit with Dr. Gonsales on 9/24 at noon with oncology labs at 1:30 and infusion at 2pm. Patient states understanding and agreement. Sent to scheduling.      Referred by Aretha Hernández MD   Discharge summary 8/21/24  Hospital Course  Gucci Logan is a 74 year old male admitted on 8/19/2024. He has a history of metastatic prostate cancer complicated by bone metastases and left femur fracture s/p repair, cirrhosis secondary to chronic hepatitis C infection, pancytopenia, hypertension, and SHONDA is admitted for ACS rule out. Found to have HCM without evidence of LVOT obstruction. HIDA scan with normal EF, making cholecystitis less likely.  The following problems were addressed during his hospitalization:        #Chest pain  #ACS rule out  # Troponin leak 2/2 HCM without LVOT  #Hypertension  The patient reports having substernal chest pain at rest that improved with nitroglycerin and aspirin. On admission, ECG was negative for ST changes and showed sinus rhythm with left anterior fascicular block and troponin was elevated and trended from 51 to 57 to 63. HEART score is 6. Last echocardiogram 12/04/23 with EF>70%  and concern for hypertrophic cardiomyopathy. Last stress test on 9/12/23 was negative for inducible myocardial ischemia or infarction. Work up:  - TTE: HCM with systolic anterior motion of mitral valve, no e/o  LVOT   - Cardiology consult              - trop leak  thought secondary to microvascular dysfunction from hypertrophy  - started on ranolazine BID 8/20  - Consider genetic testing for himself and children   - Follow up with cardiology, referral placed (with Dr. Gonsales).  - PTA aspirin 81mg daily  - PTA metoprolol 150mg daily   - PTA valsartan 160mg daily  -Pharmacy performed a med rec on this admission  - resume home care on discharge (RN, PT, OT)    Genetic testing: None prior    Prior Testing:     *Echo on 8/20/24 - Images in chart  Interpretation Summary  Known hypertrophic cardiomyopathy with asymmetric septal hypertrophy  phenotype.  Global and regional left ventricular function is normal with an EF of 60-65%.  There is asymmetric septal hypertrophy. The maximal wall thickness is 2.6 cm  in the basal anteroseptal segment. There is a resting LVOT gradient of 19 mmHg  that increases to 72 mmHg with Valsalva.  Global right ventricular function is normal. The right ventricle is normal  size.  There is systolic anterior motion of the mitral valve without septal contact.  Mild mitral regurgitation.  IVC diameter <2.1 cm collapsing >50% with sniff suggests a normal RA pressure  of 3 mmHg.  This study was compared with the study from 12/04/2023. No significant changes  noted.  _________________________    *Stress CMR 12/14/23 - [facility] images requested

## 2024-08-26 NOTE — PROGRESS NOTES
[facility]: Cancer Care                                                                                        Patient was directed to me via cardiology. It was the assumption that this patient was being covered by me. It was not the correct RNCC. RN will direct it to the correct RNCC for patient.     Mily GREENN, RN, OCN  Care Coordinator  [facility]

## 2024-08-26 NOTE — ORAL ONC MGMT
Oral Chemotherapy Monitoring Program    Subjective/Objective:  Gucci Logan is a 74 year old male contacted by phone for a follow-up visit for oral chemotherapy.  Canelo calls back and leaves a message describing no discernible new symptoms or adverse effects from new darolutamide therapy started 8/14.  States that is all we should need at this time.        7/19/2024     1:00 PM 8/6/2024     9:00 AM 8/14/2024     3:00 PM 8/22/2024     2:00 PM 8/23/2024    11:00 AM 8/26/2024     9:00 AM   ORAL CHEMOTHERAPY   Assessment Type Initial Work up Refill New Teach Left Voicemail Left Voicemail Initial Follow up   Diagnosis Code Prostate Cancer Prostate Cancer Prostate Cancer Prostate Cancer Prostate Cancer Prostate Cancer   Providers Dr. Dimitrios Plunkett   [facility]   Is this patient followed by the [facility] team? No No No No No No   Drug Name Nubeqa (darolutamide) Nubeqa (darolutamide) Nubeqa (darolutamide) Nubeqa (darolutamide) Nubeqa (darolutamide) Nubeqa (darolutamide)   Dose 600 mg 600 mg 600 mg 600 mg 600 mg 600 mg   Current Schedule BID BID BID BID BID BID   Cycle Details Continuous Continuous Continuous Continuous Continuous Continuous   Start Date of Last Cycle   8/14/2024 8/14/2024   Planned next cycle start date   9/4/2024      Adverse Effects      No AE identified during assessment   Any new drug interactions?  Yes       Pharmacist Intervention?  No       Is the dose as ordered appropriate for the patient?  Yes           Last PHQ-2 Score on record:       1/5/2024    10:07 AM 1/5/2024    10:06 AM   PHQ-2 ( 1999 [employer])   Q1: Little interest or pleasure in doing things 0 0   Q2: Feeling down, depressed or hopeless 0 0   PHQ-2 Score 0 0   Q1: Little interest or pleasure in doing things Not at all    Q2: Feeling down, depressed or hopeless Not at all    PHQ-2 Score 0        Vitals:  BP:   BP Readings  "from Last 1 Encounters:   08/21/24 129/53     Wt Readings from Last 1 Encounters:   08/19/24 104.8 kg (231 lb)     Estimated body surface area is 2.28 meters squared as calculated from the following:    Height as of 8/19/24: 1.778 m (5' 10\").    Weight as of 8/19/24: 104.8 kg (231 lb).    Labs:  _  Result Component Current Result Ref Range   Sodium 140 (8/21/2024) 135 - 145 mmol/L     _  Result Component Current Result Ref Range   Potassium 4.2 (8/21/2024) 3.4 - 5.3 mmol/L     _  Result Component Current Result Ref Range   Calcium 7.9 (L) (8/21/2024) 8.8 - 10.4 mg/dL     _  Result Component Current Result Ref Range   Magnesium 1.8 (8/21/2024) 1.7 - 2.3 mg/dL     No results found for Phos within last 30 days.     _  Result Component Current Result Ref Range   Albumin 2.7 (L) (8/20/2024) 3.5 - 5.2 g/dL     _  Result Component Current Result Ref Range   Urea Nitrogen 20.7 (8/21/2024) 8.0 - 23.0 mg/dL     _  Result Component Current Result Ref Range   Creatinine 1.04 (8/21/2024) 0.67 - 1.17 mg/dL     _  Result Component Current Result Ref Range   AST 28 (8/20/2024) 0 - 45 U/L     _  Result Component Current Result Ref Range   ALT 18 (8/20/2024) 0 - 70 U/L     _  Result Component Current Result Ref Range   Bilirubin Total 1.1 (8/20/2024) <=1.2 mg/dL     _  Result Component Current Result Ref Range   WBC Count 2.7 (L) (8/21/2024) 4.0 - 11.0 10e3/uL     _  Result Component Current Result Ref Range   Hemoglobin 9.1 (L) (8/21/2024) 13.3 - 17.7 g/dL     _  Result Component Current Result Ref Range   Platelet Count 45 (LL) (8/21/2024) 150 - 450 10e3/uL     _  Result Component Current Result Ref Range   Absolute Neutrophils 2.1 (8/21/2024) 1.6 - 8.3 10e3/uL     _  Result Component Current Result Ref Range   Absolute Neutrophils 2.5 (8/14/2024) 1.6 - 8.3 10e3/uL          Assessment/Plan:  Continue darolutamide.    Follow-Up:  Labs/appt/infusion 9/5    Refill Due:  ~9/13    Thank you,  Daron Fuller, PharmD  Oral Chemotherapy " Monitoring Program - Skin/Sarcoma/  [phone]

## 2024-08-26 NOTE — PROGRESS NOTES
[facility]: Cancer Care                                                                                          Received call from Cardiology department passing on a message that patient reports he is feeling fine on his oral chemo medications.  No further action.    Piedad Rivas RN  Cancer Care Coordinator  [facility]

## 2024-08-27 ENCOUNTER — MEDICAL CORRESPONDENCE (OUTPATIENT)
Dept: HEALTH INFORMATION MANAGEMENT | Facility: CLINIC | Age: 74
End: 2024-08-27

## 2024-08-27 ENCOUNTER — TELEPHONE (OUTPATIENT)
Dept: FAMILY MEDICINE | Facility: CLINIC | Age: 74
End: 2024-08-27

## 2024-08-27 ENCOUNTER — PRE VISIT (OUTPATIENT)
Dept: CARDIOLOGY | Facility: CLINIC | Age: 74
End: 2024-08-27

## 2024-08-27 ENCOUNTER — OFFICE VISIT (OUTPATIENT)
Dept: FAMILY MEDICINE | Facility: CLINIC | Age: 74
End: 2024-08-27
Payer: COMMERCIAL

## 2024-08-27 VITALS
HEIGHT: 71 IN | TEMPERATURE: 98.7 F | DIASTOLIC BLOOD PRESSURE: 68 MMHG | WEIGHT: 231 LBS | BODY MASS INDEX: 32.34 KG/M2 | HEART RATE: 74 BPM | OXYGEN SATURATION: 96 % | RESPIRATION RATE: 18 BRPM | SYSTOLIC BLOOD PRESSURE: 143 MMHG

## 2024-08-27 DIAGNOSIS — M11.20 CALCIUM PYROPHOSPHATE DEPOSITION DISEASE: ICD-10-CM

## 2024-08-27 DIAGNOSIS — I10 HYPERTENSION GOAL BP (BLOOD PRESSURE) < 140/90: ICD-10-CM

## 2024-08-27 DIAGNOSIS — Z98.890 STATUS POST INCISION AND DRAINAGE: Primary | ICD-10-CM

## 2024-08-27 PROBLEM — R73.01 IMPAIRED FASTING GLUCOSE: Status: RESOLVED | Noted: 2017-04-19 | Resolved: 2024-08-27

## 2024-08-27 PROBLEM — R55 SYNCOPE, UNSPECIFIED SYNCOPE TYPE: Status: RESOLVED | Noted: 2023-02-20 | Resolved: 2024-08-27

## 2024-08-27 PROBLEM — E11.9 NON-INSULIN DEPENDENT TYPE 2 DIABETES MELLITUS (H): Status: RESOLVED | Noted: 2020-02-20 | Resolved: 2024-08-27

## 2024-08-27 PROBLEM — N18.30 STAGE 3 CHRONIC KIDNEY DISEASE, UNSPECIFIED WHETHER STAGE 3A OR 3B CKD (H): Status: RESOLVED | Noted: 2024-01-23 | Resolved: 2024-08-27

## 2024-08-27 PROBLEM — E66.812 CLASS 2 SEVERE OBESITY DUE TO EXCESS CALORIES WITH SERIOUS COMORBIDITY IN ADULT (H): Status: RESOLVED | Noted: 2024-06-04 | Resolved: 2024-08-27

## 2024-08-27 PROBLEM — E66.01 CLASS 2 SEVERE OBESITY DUE TO EXCESS CALORIES WITH SERIOUS COMORBIDITY IN ADULT (H): Status: RESOLVED | Noted: 2024-06-04 | Resolved: 2024-08-27

## 2024-08-27 PROBLEM — E11.9 DIABETES MELLITUS WITHOUT COMPLICATION (H): Status: RESOLVED | Noted: 2023-02-28 | Resolved: 2024-08-27

## 2024-08-27 PROBLEM — E13.9 DIABETES 1.5, MANAGED AS TYPE 2 (H): Status: ACTIVE | Noted: 2024-08-05
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
## 2024-08-27 PROBLEM — E11.9 DIABETES MELLITUS, TYPE 2 (H): Status: RESOLVED | Noted: 2018-11-09 | Resolved: 2024-08-27

## 2024-08-27 PROBLEM — R10.11 RIGHT UPPER QUADRANT ABDOMINAL PAIN: Status: RESOLVED | Noted: 2024-08-20 | Resolved: 2024-08-27

## 2024-08-27 PROBLEM — R10.9 ABDOMINAL PAIN: Status: RESOLVED | Noted: 2020-07-09 | Resolved: 2024-08-27

## 2024-08-27 PROBLEM — S72.142A INTERTROCHANTERIC FRACTURE OF LEFT FEMUR, CLOSED, INITIAL ENCOUNTER (H): Status: RESOLVED | Noted: 2024-05-19 | Resolved: 2024-08-27
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
## 2024-08-27 PROCEDURE — 99213 OFFICE O/P EST LOW 20 MIN: CPT | Performed by: FAMILY MEDICINE

## 2024-08-27 NOTE — PROGRESS NOTES
"  Assessment & Plan     Status post incision and drainage  Calcium pyrophosphate deposition disease  Staples removed from right thigh/knee incision; tolerated well. Wound is dressed and wound care is discussed. Call or return to clinic as needed if these symptoms worsen or fail to improve as anticipated.     Hypertension goal BP (blood pressure) < 140/90  Elevated blood pressure today; follow-up recommended     Need for prophylactic vaccination against hepatitis A  - hepatitis A vaccine (VAQTA) 50 UNIT/ML injection; Inject 1 mL (50 Units) into the muscle once for 1 dose.    Follow-up with PCP as scheduled       MED REC REQUIRED   Post Medication Reconciliation Status: discharge medications reconciled, continue medications without change  BMI  Estimated body mass index is 32.68 kg/m  as calculated from the following:    Height as of this encounter: 1.791 m (5' 10.5\").    Weight as of this encounter: 104.8 kg (231 lb).   Weight management plan: Patient was referred to their PCP to discuss a diet and exercise plan.          Lakhwinder Lemos is a 74 year old, presenting for the following health issues:  Suture Removal (Staples removed right knee)        8/27/2024     4:25 PM   Additional Questions   Roomed by Shilpi DUNBAR CMA   Accompanied by Self         8/27/2024     4:25 PM   Patient Reported Additional Medications   Patient reports taking the following new medications None     History of Present Illness       Reason for visit:  [facility] Follow-up Visit:    Hospital/Nursing Home/IP Rehab Facility:  [facility]  Date of Admission: 8/5/24  Date of Discharge: 8/13/24  Reason(s) for Admission: Fever and knee pain  Was the patient in the ICU or did the patient experience delirium during hospitalization?  Yes    Do you have any other stressors you would like to discuss with your provider? No    Problems taking medications regularly:  None  Medication changes since discharge: None  Problems adhering to " "non-medication therapy:  None    Summary of hospitalization:  CareEverywhere information obtained and reviewed  Diagnostic Tests/Treatments reviewed.  Follow up needed: none  Other Healthcare Providers Involved in Patient s Care:         Specialist appointment - orthopedic surgery 8/19/24   Update since discharge: improved.       Gucci Logan is a 74 year old male who presents with follow-up of right knee I&D for pseudogout in need of staple removal.     He has had several hospitalizations including ORIF of pathologic left femur fracture in May with metastatic prostate cancer and chronic liver cirrhosis due to hepatitis C. Symptom onset has been unchanged for a time period of months. Severity is described as moderate. Course of his symptoms over time is unchanged.     Plan of care communicated with patient                 Review of Systems  CONSTITUTIONAL: NEGATIVE for fever, chills, change in weight  ENT/MOUTH: NEGATIVE for ear, mouth and throat problems  RESP: NEGATIVE for significant cough or SOB  MUSCULOSKELETAL: s/p ORIF left femur   ENDOCRINE: Hx diabetes  HEME/ALLERGY/IMMUNE: thrombocytopenia       Objective    BP (!) 143/68 (BP Location: Right arm, Patient Position: Sitting, Cuff Size: Adult Large)   Pulse 74   Temp 98.7  F (37.1  C) (Temporal)   Resp 18   Ht 1.791 m (5' 10.5\")   Wt 104.8 kg (231 lb)   SpO2 96%   BMI 32.68 kg/m    Body mass index is 32.68 kg/m .  Physical Exam   GENERAL: alert and no distress  NECK: no adenopathy, no asymmetry, masses, or scars  RESP: lungs clear to auscultation - no rales, rhonchi or wheezes  CV: regular rate and rhythm, normal S1 S2, no S3 or S4, no murmur, click or rub, no peripheral edema  MS: in wheelchair; no deformity   SKIN: healing incision on distal lateral right thigh; staples are removed and wound is dressed   PSYCH: mentation appears normal, affect normal/bright    Admission on 08/19/2024, Discharged on 08/21/2024   Component Date Value Ref Range " Status    Lipase 08/19/2024 23  13 - 60 U/L Final    Sodium 08/19/2024 140  135 - 145 mmol/L Final    Potassium 08/19/2024 4.2  3.4 - 5.3 mmol/L Final    Carbon Dioxide (CO2) 08/19/2024 22  22 - 29 mmol/L Final    Anion Gap 08/19/2024 10  7 - 15 mmol/L Final    Urea Nitrogen 08/19/2024 23.4 (H)  8.0 - 23.0 mg/dL Final    Creatinine 08/19/2024 0.91  0.67 - 1.17 mg/dL Final    GFR Estimate 08/19/2024 89  >60 mL/min/1.73m2 Final    eGFR calculated using 2021 CKD-EPI equation.    Calcium 08/19/2024 8.3 (L)  8.8 - 10.4 mg/dL Final    Reference intervals for this test were updated on 7/16/2024 to reflect our healthy population more accurately. There may be differences in the flagging of prior results with similar values performed with this method. Those prior results can be interpreted in the context of the updated reference intervals.    Chloride 08/19/2024 108 (H)  98 - 107 mmol/L Final    Glucose 08/19/2024 108 (H)  70 - 99 mg/dL Final    Alkaline Phosphatase 08/19/2024 149  40 - 150 U/L Final    AST 08/19/2024 32  0 - 45 U/L Final    ALT 08/19/2024 22  0 - 70 U/L Final    Protein Total 08/19/2024 5.6 (L)  6.4 - 8.3 g/dL Final    Albumin 08/19/2024 3.0 (L)  3.5 - 5.2 g/dL Final    Bilirubin Total 08/19/2024 1.3 (H)  <=1.2 mg/dL Final    Troponin T, High Sensitivity 08/19/2024 51 (H)  <=22 ng/L Final    Either a High Sensitivity Troponin T baseline (0 hours) value = 100 ng/L, or an increase in High Sensitivity Troponin T = 7 ng/L at 2 hours compared to 0 hours (2-0 hours), suggests myocardial injury, and urgent clinical attention is required.    If the 2-0 hours increase is <7 ng/L, a High Sensitivity Troponin T result above gender-specific reference ranges warrants further evaluation.   Recommendations for further evaluation include correlation with clinical decision-making tool (e.g., HEART), a 3rd High Sensitivity Troponin T test 2 hours after the 2nd (a 20% change from baseline would represent concern), admission  for observation, close PCC/cardiology follow-up, or urgent outpatient provocative testing.    D-Dimer Quantitative 08/19/2024 9.24 (H)  0.00 - 0.50 ug/mL FEU Final    INR 08/19/2024 1.32 (H)  0.85 - 1.15 Final    Ventricular Rate 08/19/2024 75  BPM Final    Atrial Rate 08/19/2024 75  BPM Final    ME Interval 08/19/2024 154  ms Final    QRS Duration 08/19/2024 96  ms Final    QT 08/19/2024 422  ms Final    QTc 08/19/2024 471  ms Final    P Axis 08/19/2024 53  degrees Final    R AXIS 08/19/2024 -54  degrees Final    T Axis 08/19/2024 95  degrees Final    Interpretation ECG 08/19/2024    Final                    Value:Sinus rhythm  Left anterior fascicular block  Cannot rule out Inferior infarct , age undetermined  Cannot rule out Anterior infarct , age undetermined  Abnormal ECG    Unconfirmed report - interpretation of this ECG is computer generated - see medical record for final interpretation  Confirmed by - EMERGENCY ROOM, PHYSICIAN ([id number]),  ABBEY RASMUSSEN ([id number]) on 8/20/2024 7:06:19 AM      WBC Count 08/19/2024 2.7 (L)  4.0 - 11.0 10e3/uL Final    RBC Count 08/19/2024 3.28 (L)  4.40 - 5.90 10e6/uL Final    Hemoglobin 08/19/2024 9.7 (L)  13.3 - 17.7 g/dL Final    Hematocrit 08/19/2024 31.4 (L)  40.0 - 53.0 % Final    MCV 08/19/2024 96  78 - 100 fL Final    MCH 08/19/2024 29.6  26.5 - 33.0 pg Final    MCHC 08/19/2024 30.9 (L)  31.5 - 36.5 g/dL Final    RDW 08/19/2024 15.1 (H)  10.0 - 15.0 % Final    Platelet Count 08/19/2024 46 (LL)  150 - 450 10e3/uL Final    NRBCs per 100 WBC 08/19/2024 0  <1 /100 Final    Absolute NRBCs 08/19/2024 0.0  10e3/uL Final    Troponin T, High Sensitivity 08/19/2024 57 (H)  <=22 ng/L Final    Either a High Sensitivity Troponin T baseline (0 hours) value = 100 ng/L, or an increase in High Sensitivity Troponin T = 7 ng/L at 2 hours compared to 0 hours (2-0 hours), suggests myocardial injury, and urgent clinical attention is required.    If the 2-0 hours increase is <7 ng/L, a  High Sensitivity Troponin T result above gender-specific reference ranges warrants further evaluation.   Recommendations for further evaluation include correlation with clinical decision-making tool (e.g., HEART), a 3rd High Sensitivity Troponin T test 2 hours after the 2nd (a 20% change from baseline would represent concern), admission for observation, close PCC/cardiology follow-up, or urgent outpatient provocative testing.    % Neutrophils 08/19/2024 89  % Final    % Lymphocytes 08/19/2024 11  % Final    % Monocytes 08/19/2024 0  % Final    % Eosinophils 08/19/2024 0  % Final    % Basophils 08/19/2024 0  % Final    Absolute Neutrophils 08/19/2024 2.4  1.6 - 8.3 10e3/uL Final    Absolute Lymphocytes 08/19/2024 0.3 (L)  0.8 - 5.3 10e3/uL Final    Absolute Monocytes 08/19/2024 0.0  0.0 - 1.3 10e3/uL Final    Absolute Eosinophils 08/19/2024 0.0  0.0 - 0.7 10e3/uL Final    Absolute Basophils 08/19/2024 0.0  0.0 - 0.2 10e3/uL Final    RBC Morphology 08/19/2024 Confirmed RBC Indices   Final    Platelet Assessment 08/19/2024 Automated Count Confirmed. Platelet morphology is normal.  Automated Count Confirmed. Platelet morphology is normal. Final    Troponin T, High Sensitivity 08/20/2024 63 (H)  <=22 ng/L Final    Either a High Sensitivity Troponin T baseline (0 hours) value = 100 ng/L, or an increase in High Sensitivity Troponin T = 7 ng/L at 2 hours compared to 0 hours (2-0 hours), suggests myocardial injury, and urgent clinical attention is required.    If the 2-0 hours increase is <7 ng/L, a High Sensitivity Troponin T result above gender-specific reference ranges warrants further evaluation.   Recommendations for further evaluation include correlation with clinical decision-making tool (e.g., HEART), a 3rd High Sensitivity Troponin T test 2 hours after the 2nd (a 20% change from baseline would represent concern), admission for observation, close PCC/cardiology follow-up, or urgent outpatient provocative testing.     Bilirubin Direct 08/19/2024 0.44 (H)  0.00 - 0.30 mg/dL Final    GLUCOSE BY METER POCT 08/20/2024 115 (H)  70 - 99 mg/dL Final    LVEF  08/20/2024 60-65%   Final    Sodium 08/20/2024 140  135 - 145 mmol/L Final    Potassium 08/20/2024 4.1  3.4 - 5.3 mmol/L Final    Carbon Dioxide (CO2) 08/20/2024 25  22 - 29 mmol/L Final    Anion Gap 08/20/2024 6 (L)  7 - 15 mmol/L Final    Urea Nitrogen 08/20/2024 19.4  8.0 - 23.0 mg/dL Final    Creatinine 08/20/2024 0.95  0.67 - 1.17 mg/dL Final    GFR Estimate 08/20/2024 85  >60 mL/min/1.73m2 Final    eGFR calculated using 2021 CKD-EPI equation.    Calcium 08/20/2024 7.9 (L)  8.8 - 10.4 mg/dL Final    Reference intervals for this test were updated on 7/16/2024 to reflect our healthy population more accurately. There may be differences in the flagging of prior results with similar values performed with this method. Those prior results can be interpreted in the context of the updated reference intervals.    Chloride 08/20/2024 109 (H)  98 - 107 mmol/L Final    Glucose 08/20/2024 145 (H)  70 - 99 mg/dL Final    Alkaline Phosphatase 08/20/2024 120  40 - 150 U/L Final    AST 08/20/2024 28  0 - 45 U/L Final    ALT 08/20/2024 18  0 - 70 U/L Final    Protein Total 08/20/2024 5.0 (L)  6.4 - 8.3 g/dL Final    Albumin 08/20/2024 2.7 (L)  3.5 - 5.2 g/dL Final    Bilirubin Total 08/20/2024 1.1  <=1.2 mg/dL Final    WBC Count 08/20/2024 1.4 (L)  4.0 - 11.0 10e3/uL Final    RBC Count 08/20/2024 2.85 (L)  4.40 - 5.90 10e6/uL Final    Hemoglobin 08/20/2024 8.3 (L)  13.3 - 17.7 g/dL Final    Hematocrit 08/20/2024 27.6 (L)  40.0 - 53.0 % Final    MCV 08/20/2024 97  78 - 100 fL Final    MCH 08/20/2024 29.1  26.5 - 33.0 pg Final    MCHC 08/20/2024 30.1 (L)  31.5 - 36.5 g/dL Final    RDW 08/20/2024 15.1 (H)  10.0 - 15.0 % Final    Platelet Count 08/20/2024 41 (LL)  150 - 450 10e3/uL Final    NRBCs per 100 WBC 08/20/2024 0  <1 /100 Final    Absolute NRBCs 08/20/2024 0.0  10e3/uL Final    Magnesium  08/20/2024 1.6 (L)  1.7 - 2.3 mg/dL Final    % Neutrophils 08/20/2024 69  % Final    % Lymphocytes 08/20/2024 16  % Final    % Monocytes 08/20/2024 6  % Final    % Eosinophils 08/20/2024 2  % Final    % Basophils 08/20/2024 2  % Final    % Metamyelocytes 08/20/2024 4  % Final    % Myelocytes 08/20/2024 1  % Final    NRBCs per 100 WBC 08/20/2024 1 (H)  <=0 % Final    Absolute Neutrophils 08/20/2024 1.0 (L)  1.6 - 8.3 10e3/uL Final    Absolute Lymphocytes 08/20/2024 0.2 (L)  0.8 - 5.3 10e3/uL Final    Absolute Monocytes 08/20/2024 0.1  0.0 - 1.3 10e3/uL Final    Absolute Eosinophils 08/20/2024 0.0  0.0 - 0.7 10e3/uL Final    Absolute Basophils 08/20/2024 0.0  0.0 - 0.2 10e3/uL Final    Absolute Metamyelocytes 08/20/2024 0.1 (H)  <=0.0 10e3/uL Final    Absolute Myelocytes 08/20/2024 0.0  <=0.0 10e3/uL Final    Absolute NRBCs 08/20/2024 0.0  <=0.0 10e3/uL Final    RBC Morphology 08/20/2024 Confirmed RBC Indices   Final    Platelet Assessment 08/20/2024 Automated Count Confirmed. Platelet morphology is normal.  Automated Count Confirmed. Platelet morphology is normal. Final    Elliptocytes 08/20/2024 Slight (A)  None Seen Final    Toxic Neutrophils 08/20/2024 Present (A)  None Seen Final    N terminal Pro BNP Inpatient 08/20/2024 4,816 (H)  0 - 900 pg/mL Final    Reference range shown and results flagged as abnormal are suggested inpatient cut points for confirming diagnosis if CHF in an acute setting. Establishing a baseline value for each individual patient is useful for follow-up. An inpatient or emergency department NT-proPBNP <300 pg/mL effectively rules out acute CHF, with 99% negative predictive value.    The outpatient non-acute reference range for ruling out CHF is:  0-125 pg/mL (age 18 to less than 75)  0-450 pg/mL (age 75 yrs and older)     Sodium 08/21/2024 140  135 - 145 mmol/L Final    Potassium 08/21/2024 4.2  3.4 - 5.3 mmol/L Final    Chloride 08/21/2024 109 (H)  98 - 107 mmol/L Final    Carbon Dioxide  (CO2) 08/21/2024 25  22 - 29 mmol/L Final    Anion Gap 08/21/2024 6 (L)  7 - 15 mmol/L Final    Urea Nitrogen 08/21/2024 20.7  8.0 - 23.0 mg/dL Final    Creatinine 08/21/2024 1.04  0.67 - 1.17 mg/dL Final    GFR Estimate 08/21/2024 76  >60 mL/min/1.73m2 Final    eGFR calculated using 2021 CKD-EPI equation.    Calcium 08/21/2024 7.9 (L)  8.8 - 10.4 mg/dL Final    Reference intervals for this test were updated on 7/16/2024 to reflect our healthy population more accurately. There may be differences in the flagging of prior results with similar values performed with this method. Those prior results can be interpreted in the context of the updated reference intervals.    Glucose 08/21/2024 142 (H)  70 - 99 mg/dL Final    Magnesium 08/21/2024 1.8  1.7 - 2.3 mg/dL Final    WBC Count 08/21/2024 2.7 (L)  4.0 - 11.0 10e3/uL Final    RBC Count 08/21/2024 3.07 (L)  4.40 - 5.90 10e6/uL Final    Hemoglobin 08/21/2024 9.1 (L)  13.3 - 17.7 g/dL Final    Hematocrit 08/21/2024 29.6 (L)  40.0 - 53.0 % Final    MCV 08/21/2024 96  78 - 100 fL Final    MCH 08/21/2024 29.6  26.5 - 33.0 pg Final    MCHC 08/21/2024 30.7 (L)  31.5 - 36.5 g/dL Final    RDW 08/21/2024 15.1 (H)  10.0 - 15.0 % Final    Platelet Count 08/21/2024 45 (LL)  150 - 450 10e3/uL Final    NRBCs per 100 WBC 08/21/2024 0  <1 /100 Final    Absolute NRBCs 08/21/2024 0.0  10e3/uL Final    % Neutrophils 08/21/2024 76  % Final    % Lymphocytes 08/21/2024 10  % Final    % Monocytes 08/21/2024 7  % Final    % Eosinophils 08/21/2024 1  % Final    % Basophils 08/21/2024 0  % Final    % Myelocytes 08/21/2024 6  % Final    NRBCs per 100 WBC 08/21/2024 1 (H)  <=0 % Final    Absolute Neutrophils 08/21/2024 2.1  1.6 - 8.3 10e3/uL Final    Absolute Lymphocytes 08/21/2024 0.3 (L)  0.8 - 5.3 10e3/uL Final    Absolute Monocytes 08/21/2024 0.2  0.0 - 1.3 10e3/uL Final    Absolute Eosinophils 08/21/2024 0.0  0.0 - 0.7 10e3/uL Final    Absolute Basophils 08/21/2024 0.0  0.0 - 0.2 10e3/uL Final     Absolute Myelocytes 08/21/2024 0.2 (H)  <=0.0 10e3/uL Final    Absolute NRBCs 08/21/2024 0.0  <=0.0 10e3/uL Final    RBC Morphology 08/21/2024 Confirmed RBC Indices   Final    Platelet Assessment 08/21/2024 Automated Count Confirmed. Platelet morphology is normal.  Automated Count Confirmed. Platelet morphology is normal. Final    Elliptocytes 08/21/2024 Slight (A)  None Seen Final    Polychromasia 08/21/2024 Slight (A)  None Seen Final    Target Cells 08/21/2024 Slight (A)  None Seen Final     No results found. However, due to the size of the patient record, not all encounters were searched. Please check Results Review for a complete set of results.        Signed Electronically by: Ale Arellano MD

## 2024-08-27 NOTE — TELEPHONE ENCOUNTER
Patient calling in and stating that his home care team will not take out his staples. He was told to call PCP clinic back. Patient states he needs to get these removed they have been in his knee for 3-4 weeks. Patient stated he will make it in for an appointment if a provider can remove them.      RN saw 5 pm availability for appointment today with Dr. Arellano, patient is scheduled. Patient reports these were placed within [facility] but he does not want to/cannot make it to [facility] to get them removed. He is requesting [facility]. Is this something you would be ok with doing and seeing patient for today? Patient states it is 10 staples in his R knee.         Please advise.       Randi Tello RN on 8/27/2024 at 2:46 PM

## 2024-08-27 NOTE — TELEPHONE ENCOUNTER
Received call from pharmacist with [facility], [place]. They received prescription for Hep A vaccine, but she believes that patient is up to date on this vaccine. He has received 3 Hep A vaccines. Per chart review, pt received vaccines 5/10/23, 3/1/2005, and 9/08/2004. She is asking for provider to review and if in agreement, please discontinue Rx or have a nurse call them back.    Margarette Crowell RN

## 2024-08-28 ENCOUNTER — VIRTUAL VISIT (OUTPATIENT)
Dept: FAMILY MEDICINE | Facility: CLINIC | Age: 74
End: 2024-08-28
Payer: COMMERCIAL

## 2024-08-28 DIAGNOSIS — K21.9 GASTROESOPHAGEAL REFLUX DISEASE WITHOUT ESOPHAGITIS: ICD-10-CM

## 2024-08-28 DIAGNOSIS — C61 PROSTATE CANCER METASTATIC TO BONE (H): ICD-10-CM

## 2024-08-28 DIAGNOSIS — I42.2 HYPERTROPHIC CARDIOMYOPATHY (H): ICD-10-CM

## 2024-08-28 DIAGNOSIS — E11.43 TYPE 2 DIABETES MELLITUS WITH DIABETIC AUTONOMIC NEUROPATHY, WITHOUT LONG-TERM CURRENT USE OF INSULIN (H): ICD-10-CM

## 2024-08-28 DIAGNOSIS — C79.51 PROSTATE CANCER METASTATIC TO BONE (H): ICD-10-CM

## 2024-08-28 DIAGNOSIS — Z87.81 S/P LEFT HIP FRACTURE: Primary | ICD-10-CM

## 2024-08-28 PROCEDURE — 99443 PR PHYSICIAN TELEPHONE EVALUATION 21-30 MIN: CPT | Mod: 93 | Performed by: FAMILY MEDICINE

## 2024-08-28 RX ORDER — OXYCODONE HYDROCHLORIDE 5 MG/1
5-10 TABLET ORAL EVERY 4 HOURS PRN
Qty: 70 TABLET | Refills: 0 | Status: SHIPPED | OUTPATIENT
Start: 2024-08-31 | End: 2024-09-11

## 2024-08-28 NOTE — PROGRESS NOTES
Canelo is a 74 year old who is being evaluated via a billable telephone visit.    What phone number would you like to be contacted at? [phone]  How would you like to obtain your AVS? Mail a copy  Originating Location (pt. Location): Home    Distant Location (provider location):  On-site        Subjective   Canelo is a 74 year old, presenting for the following health issues:  RECHECK        8/28/2024     9:57 AM   Additional Questions   Roomed by Sofia Conde     History of Present Illness       Reason for visit:  Removestitches      Pain on right chest wall near nipple gets pain when transferring    Got stitches out yesterday    Nausea and fatigue    30 pain pills left    Vision and taste are affected    On chemo for about 2 weeks    One week of infusion then rest oral    Scheduled through Oct/Nov    Standing up is challenging but can stand for 10-15 minutes, not much pain     In wheelchair most of time    Not driving    Tried melatonin, did not help    Using about 6 pain pills daily    Has 30 left    Tired     More swelling          Objective           Vitals:  No vitals were obtained today due to virtual visit.    Physical Exam   General: Alert and no distress //Respiratory: No audible wheeze, cough, or shortness of breath // Psychiatric:  Appropriate affect, tone, and pace of words       ASSESSMENT / PLAN:  (Z87.81) S/p left hip fracture  (primary encounter diagnosis)  Comment: patient has cut down usage some but still at about 5-6 pills daily.  Will run out this weekend.  Sent in refill to  Saturday Aug 31.  Then do another phone visit in a couple weeks like about Sept 11.  Plan: oxyCODONE (ROXICODONE) 5 MG tablet             (C61,  C79.51) Prostate cancer metastatic to bone (H)  Comment: as above  Plan: oxyCODONE (ROXICODONE) 5 MG tablet             (K21.9) Gastroesophageal reflux disease without esophagitis  Comment: needs refill   Plan: omeprazole (PRILOSEC) 20 MG DR capsule             (E11.43)  Type 2 diabetes mellitus with diabetic autonomic neuropathy, without long-term current use of insulin (H)  Comment: needs refill soon   Plan: metFORMIN (GLUCOPHAGE) 500 MG tablet             (I42.2) Hypertrophic cardiomyopathy (H)  Comment: patient has appointment with cardiology coming up   Plan: as above       I reviewed the patient's medications, allergies, medical history, family history, and social history.    Richi Jaramillo MD        Phone call duration: 22 minutes ( 10:07 to 10:29 am )  Signed Electronically by: Richi Jaramillo MD

## 2024-08-29 NOTE — TELEPHONE ENCOUNTER
Action [facility] Requested:   Action Taken CVL Coronary Angio Images  MR Cardiac Images 1-19-24 12-14-23

## 2024-08-30 NOTE — PROGRESS NOTES
PRIMARY CARE PHYSICIAN: Richi Jaramillo MD  ORTHOPEDIC SURGERY: Rafa Wagner MD   RADIATION ONCOLOGY: Subha Gan MD     HISTORY OF PRESENT ILLNESS: Patient is a 74-year-old male with stage IVB adenocarcinoma prostate (cT2c, cN0, cM1b, PSA 8.81 ng/mL).  Patient had a mechanical fall 05/19/2024, while working in his yard. CT head 05/19/2024, was negative.  CT cervical spine 05/19/2024, was negative for subluxation or fracture.  There was high-grade C5-C6 and C6-C7 central and bilateral foraminal stenosis.  X-ray pelvis and hips 05/19/2024, revealed a left femur fracture at the intertrochanteric/subtrochanteric junction. Left iliac crest bone marrow aspirate and left femur curettage samples at the time of the left hip fracture ORIF 05/24/2024, revealed trilineage hematopoiesis without evidence of dysplasia or increase in blasts.  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  The left femur curettage sample showed metastatic prostate adenocarcinoma that was positive for CK AE1/AE3 and NKX3.1, but negative for CK7, CK20, PAX8, TTF-1, GATA3, SATB2, CDX2, and arginase.  Previous PSA was 1.49 (08/29/2023).  68-Ga PSMA-11 PET/CT scan 07/03/2024, revealed heterogeneous PSMA uptake in the prostate without discrete focality.  There was enlarged and PSMA avid lymphadenopathy including a 2 x 1.6 cm subcarinal lymph node with SUV max 3.8 (SUV mean 2.3), bilateral common iliac and left external iliac lymph nodes including a 1.6 x 1.6 cm left common iliac lymph node with SUV max 5.3, and a 2.1 x 1.2 cm left external iliac lymph node with SUV max 5.2.  There were multiple PSMA-avid lytic/sclerotic metastases in the axial and appendicular skeleton including bilateral scapulae (right scapula with SUV  max 7.1), left humerus, bilateral ribs, spine, pelvis (left posterior iliac with SUV max 9.3), left proximal femur with pathologic fracture and hardware in place..  There is liver was cirrhotic with features of portal hypertension including splenomegaly and dilated upper abdominal portosystemic collaterals. Patient received radiation therapy to the left femur (25 Gy in 5 fractions) from 07/29/2024 through 08/02/2024.  Patient is is receiving first-line androgen deprivation therapy (ADT) consisting of degarelix 240 mg loading dose on 07/29/2024, then leuprolide 22.5 mg every 3 months beginning 08/26/2024, and docetaxel 60 mg/m  IV every 21 days x 6 cycles and darolutamide 600 mg BID beginning 08/14/2024.  There was a docetaxel 60 mg/m  dose reduction due to underlying cirrhosis prior history of hepatitis C.  Patient received 1 cycle of darolutamide/docetaxel thus far (08/14/2024).  Patient was hospitalized 08/05/2024 through 08/13/2024 for right knee pain and swelling due to pseudogout, and recovered sufficiently to proceed with chemotherapy.  Patient has fatigue, weakness, abnormal taste sensation, cough productive of yellowish sputum, chest discomfort with coughing, and occasional nausea.  Patient did not require antiemetics.  Patient has underlying gingival and dental disease with sensitive and occasional bleeding gums.  Patient has started physical therapy and occupational therapy at home but has been concerned about weightbearing because of the left femur fracture and surgery.  There are no other new symptoms or events since the prior clinic visit (07/18/2024). Patient denies fever, anorexia, headache, dyspnea, hemoptysis, abdominal pain, vomiting, constipation, or diarrhea.     PAST HISTORY: Past history was reviewed and unchanged from the clinic note 07/18/2024.     MEDICATIONS:   Current Outpatient Medications   Medication Sig Dispense Refill    acetaminophen (TYLENOL) 325 MG tablet Take 2 tablets (650 mg) by  mouth every 4 hours as needed for other (For optimal non-opioid multimodal pain management to improve pain control.) 100 tablet 0    aspirin 81 MG EC tablet Take 1 tablet (81 mg) by mouth daily 90 tablet 3    calcium citrate (CITRACAL) 950 (200 Ca) MG tablet Take 1 tablet (950 mg) by mouth daily 120 tablet 3    diclofenac (VOLTAREN) 1 % topical gel Apply 4 g topically 3 times daily as needed for moderate pain (bursitis) 150 g 1    metFORMIN (GLUCOPHAGE) 500 MG tablet Take 1 tablet (500 mg) by mouth 2 times daily (with meals). 180 tablet 1    methocarbamol (ROBAXIN) 500 MG tablet Take 1 Tablet (500 mg) by mouth every 6 hours if needed for Muscle Spasm PO 1st choice.      metoprolol succinate ER (TOPROL XL) 100 MG 24 hr tablet 1 1/2 ( 150 mg ) po daily 135 tablet 1    multivitamin w/minerals (THERA-VIT-M) tablet Take 1 tablet by mouth daily      nitroGLYcerin (NITROSTAT) 0.4 MG sublingual tablet For chest pain place 1 tablet under the tongue every 5 minutes for 3 doses. If symptoms persist 5 minutes after 1st dose call 911. 30 tablet 0    omeprazole (PRILOSEC) 20 MG DR capsule TAKE 1 CAPSULE BY MOUTH ONCE DAILY 30 TO 60 MINUTES BEFORE A MEAL 90 capsule 1    ondansetron (ZOFRAN) 8 MG tablet Take 1 tablet (8 mg) by mouth every 8 hours as needed for nausea 60 tablet 5    oxyCODONE (ROXICODONE) 5 MG tablet Take 1-2 tablets (5-10 mg) by mouth every 4 hours as needed for pain. Do not start before August 31, 2024. 70 tablet 0    predniSONE (DELTASONE) 5 MG tablet Take 1 tablet (5 mg) by mouth daily (with breakfast) Do not take prednisone on days when taking dexamethasone. 60 tablet 1    prochlorperazine (COMPAZINE) 10 MG tablet Take 0.5 tablets (5 mg) by mouth every 6 hours as needed for nausea or vomiting 60 tablet 5    ranolazine (RANEXA) 500 MG 12 hr tablet Take 1 tablet (500 mg) by mouth 2 times daily. DO NOT CRUSH. 60 tablet 3    senna-docusate (SENOKOT-S/PERICOLACE) 8.6-50 MG tablet Take 1 tablet by mouth 2 times daily  "as needed for constipation 30 tablet 1    tiZANidine (ZANAFLEX) 4 MG tablet TAKE 1/2 (ONE-HALF) TO 1  TABLET BY MOUTH UP TO THREE TIMES DAILY AS NEEDED (Patient taking differently: Take 2-4 mg by mouth 3 times daily as needed for muscle spasms. Usually takes at 4pm with tramadol) 30 tablet 0    valsartan (DIOVAN) 160 MG tablet Take 1 tablet (160 mg) by mouth daily 90 tablet 1    vitamin D3 (CHOLECALCIFEROL) 50 mcg (2000 units) tablet Take 1 tablet (50 mcg) by mouth daily 120 tablet 3       REVIEW OF SYSTEMS: Review of systems reviewed with the patient and otherwise negative except for those detailed above.    PHYSICAL EXAM: BP (!) 140/74 (BP Location: Right arm, Patient Position: Sitting, Cuff Size: Adult Regular)   Pulse 75   Temp 98.4  F (36.9  C) (Oral)   Resp 16   Ht 1.79 m (5' 10.47\")   Wt 104.8 kg (231 lb 0.7 oz)   SpO2 97%   BMI 32.71 kg/m  .  Body surface area is 2.28 meters squared. ECOG performance status: 2.  Physical exam was unchanged from prior clinic visit 07/18/2024.  Skin: No erythema or rash.  HEENT: Sclera nonicteric. Oropharynx without lesions or ulceration, mucosa pink and moist.  Nodes: No cervical, supraclavicular, axillary, or inguinal adenopathy.  Lungs: No dullness to percussion.  No rales, wheezes, rhonchi.  Heart: Regular rate and rhythm.  Abdomen: Bowel sounds present.  Soft, nontender, no hepatosplenomegaly or mass.  Extremities: 2+ lower extremity edema.     LABORATORY:   Component      Latest Ref Rn 8/14/2024  12:19 PM 9/5/2024  10:21 AM   WBC      4.0 - 11.0 10e3/uL  4.5    RBC Count      4.40 - 5.90 10e6/uL  3.33 (L)    Hemoglobin      13.3 - 17.7 g/dL  10.0 (L)    Hematocrit      40.0 - 53.0 %  30.9 (L)    MCV      78 - 100 fL  93    MCH      26.5 - 33.0 pg  30.0    MCHC      31.5 - 36.5 g/dL  32.4    RDW      10.0 - 15.0 %  16.9 (H)    Platelet Count      150 - 450 10e3/uL  95 (L)    % Neutrophils      %  78    % Lymphocytes      %  11    % Monocytes      %  9    % " Eosinophils      %  1    % Basophils      %  1    % Immature Granulocytes      %  0    NRBCs per 100 WBC      <1 /100  0    Absolute Neutrophils      1.6 - 8.3 10e3/uL  3.5    Absolute Lymphocytes      0.8 - 5.3 10e3/uL  0.5 (L)    Absolute Monocytes      0.0 - 1.3 10e3/uL  0.4    Absolute Eosinophils      0.0 - 0.7 10e3/uL  0.0    Absolute Basophils      0.0 - 0.2 10e3/uL  0.0    Absolute Immature Granulocytes      <=0.4 10e3/uL  0.0    Absolute NRBCs      10e3/uL  0.0    Sodium      135 - 145 mmol/L  140    Potassium      3.4 - 5.3 mmol/L  4.3    Carbon Dioxide (CO2)      22 - 29 mmol/L  25    Anion Gap      7 - 15 mmol/L  8    Urea Nitrogen      8.0 - 23.0 mg/dL  15.5    Creatinine      0.67 - 1.17 mg/dL  0.91    GFR Estimate      >60 mL/min/1.73m2  89    Calcium      8.8 - 10.4 mg/dL  8.8    Chloride      98 - 107 mmol/L  107    Glucose      70 - 99 mg/dL  101 (H)    Alkaline Phosphatase      40 - 150 U/L  170 (H)    AST      0 - 45 U/L  36    ALT      0 - 70 U/L  28    Protein Total      6.4 - 8.3 g/dL  5.8 (L)    Albumin      3.5 - 5.2 g/dL  3.1 (L)    Bilirubin Total      <=1.2 mg/dL  0.9    PSA Tumor Marker      0.00 - 6.50 ng/mL 16.86 (H)  6.33        IMPRESSION/PLAN: Stage IVB adenocarcinoma prostate.  Patient sustained a intertrochanteric left femur fracture after a mechanical fall at home.  Left femur curettage revealed metastatic adenocarcinoma, prostate primary.  There are large retroperitoneal and pelvic lymph node metastases diffuse skeletal metastases noted on PSMA PET/CT scan (07/03/2024).  Patient has high-volume metastatic disease and he is receiving first-line ADT with leuprolide every 3 months (beginning 07/29/2024) and docetaxel 60 mg/m  IV every 21 days x 6 cycles and darolutamide 600 mg BID (beginning 08/14/2024) in accordance with the ARASENS clinical trial (N Engl J Med 2022;386:8683-7466).  There is grade 1 fatigue, weakness, dysgeusia, cough, nausea, anemia, and thrombocytopenia.   Darolutamide/docetaxel will be continued without modification.  Patient return to ambulatory infusion center 09/05/2024, for cycle 2 of docetaxel/darolutamide. I reviewed the risk and side effects of docetaxel with the patient, which include fatigue, anorexia, weight loss, alopecia, mouth sores, nausea, vomiting, diarrhea, myalgias, edema, neuropathy, cytopenia, infection, bleeding, and allergic or anaphylactic reaction.  Darolutamide is associated with fatigue, rash, myalgias, forgetfulness, and leuprolide is associated with fatigue, hot flashes, weakness, loss of muscle mass, weight gain, forgetfulness, depression, breast enlargement, joint stiffness and pain, coronary artery disease, osteoporosis, and bone fracture.  Patient understood the indication and risks and agreed to continue therapy.  Pegfilgrastim will be administered while in the after docetaxel to speed neutrophil recovery.  Chest x-ray will be performed today to evaluate the productive cough.  Patient should continue physical therapy and occupational therapy to improve balance, strength, mobility, and home safety.  Patient will return to clinic in 3 weeks with CBC, metabolic panel, PSA.  The current and past history obtained from the patient and medical records, clinical evaluation, reviewing diagnostic tests and viewing images with the patient, and assessment and planning occurred over 30 minutes.       Souleymane Plunkett MD    cc: MD Rafa Duvall MD Chinsoo Lawrence Cho, MD

## 2024-09-03 ENCOUNTER — TELEPHONE (OUTPATIENT)
Dept: ORTHOPEDICS | Facility: CLINIC | Age: 74
End: 2024-09-03

## 2024-09-03 ENCOUNTER — NURSE TRIAGE (OUTPATIENT)
Dept: ONCOLOGY | Facility: CLINIC | Age: 74
End: 2024-09-03

## 2024-09-03 NOTE — TELEPHONE ENCOUNTER
Pt was waiting for a CALL BACK for his appointment.  Pt mentioned he is in a wheelchair and needs answers in regard to walking again. Pt also has questions about his imaging and would like to discuss his questions about his LEFT hip and the follow up of his last appointment, including imaging. Please CALL pt, as soon as possible. Thank you.

## 2024-09-03 NOTE — TELEPHONE ENCOUNTER
"Pt is wanting to know if he should take his oral chemotherapy up until the next infusion. He has been to the ED and hospital two times since last infusion. Is supposed to take darolutamide daily, but has missed some doses. Pt states he has about 10-12 pills remaining and was supposed to take them for 21 days but the nurses in the hospital did not give him the chemo pills so now he is behind.    This writer offered to check and then call him back tomorrow morning before 0900.  Pt was agreeable to that but then stated he would take medications tonight and then tomorrow morning before 0830.    Pt was very angry on the phone d/t some experiences he had with a virtual visit he was supposed to have with ortho providers that didn't work out. Pt asked this writer how he could get \"a pound of flesh\" in the [facility] system and later clarified that he meant revenge. This writer offered to connect him with Patient Relations but he declined stating that he had tried to talk to them before and waited two months for an answer.     This will be forwarded to the Care Team and Oral Chemo pharmacists to follow up with pt regarding how to take his oral darolutamide.    Routed to Dr. Plunkett and Piedad Rivas, CASANDRACC        "

## 2024-09-03 NOTE — TELEPHONE ENCOUNTER
We have tried to call the patient 4 times and it went straight to voicemail and then the mailbox was full.   I did try again and got the same result.   We will see if he calls back tomorrow.  If he does please get me on the phone to talk to him right away.

## 2024-09-04 ENCOUNTER — MEDICAL CORRESPONDENCE (OUTPATIENT)
Dept: HEALTH INFORMATION MANAGEMENT | Facility: CLINIC | Age: 74
End: 2024-09-04
Payer: COMMERCIAL

## 2024-09-04 NOTE — TELEPHONE ENCOUNTER
Clarence Roberts L43 minutes ago (8:53 AM)     GM  Summary: Call Back      Other: Patient called back again. Patient states that his phone is working fine. Patient would like to speak to the provider asap. Patient said he had someone from scheduling call him and he answered. Patient would just like to speak to his provider. Please call patient back as soon as you can.      Could we send this information to you in [facility] or would you prefer to receive a phone call?:   Patient would prefer a phone call   Okay to leave a detailed message?: Yes at Home number on file [phone] (home)

## 2024-09-04 NOTE — TELEPHONE ENCOUNTER
Per Dr. Plunkett--LVM for pt yesterday evening.  OK to resume darolutamide this evening if he wishes. Left VM on listed phone number. Please call in am to confirm with him.    Called pt back. Asked pt if he got Dr. Plunkett's voice mail message. Pt states he did but it was garbled and he couldn't understand it. Informed pt that he could continue to take his darolutamide. Pt states he asked this writer when he should stop taking the medication because he has been taking it all along. This writer told pt that this would be clarified and call was disconnected.    Checked with Care Team via secure chat and pt should continue to take darolutamide until he is seen here (tomorrow).    Called pt and he reports has just spoken with Dr. Plunkett who advised him to continue darolutamide until seen (tomorrow).    Updated Care Team.

## 2024-09-04 NOTE — CONFIDENTIAL NOTE
Other: Patient called back again. Patient states that his phone is working fine. Patient would like to speak to the provider asap. Patient said he had someone from scheduling call him and he answered. Patient would just like to speak to his provider. Please call patient back as soon as you can.     Could we send this information to you in [facility] or would you prefer to receive a phone call?:   Patient would prefer a phone call   Okay to leave a detailed message?: Yes at Home number on file [phone] (home)

## 2024-09-04 NOTE — TELEPHONE ENCOUNTER
PLEASE get us on the phone when patient calls back.  I tried to call the patient again and same thing happened.  It went right to voicemail and the mailbox is full.

## 2024-09-05 ENCOUNTER — ANCILLARY PROCEDURE (OUTPATIENT)
Dept: GENERAL RADIOLOGY | Facility: CLINIC | Age: 74
End: 2024-09-05
Attending: INTERNAL MEDICINE
Payer: COMMERCIAL

## 2024-09-05 ENCOUNTER — INFUSION THERAPY VISIT (OUTPATIENT)
Dept: ONCOLOGY | Facility: CLINIC | Age: 74
End: 2024-09-05
Attending: INTERNAL MEDICINE
Payer: COMMERCIAL

## 2024-09-05 ENCOUNTER — APPOINTMENT (OUTPATIENT)
Dept: LAB | Facility: CLINIC | Age: 74
End: 2024-09-05
Attending: INTERNAL MEDICINE
Payer: COMMERCIAL

## 2024-09-05 VITALS
BODY MASS INDEX: 33.08 KG/M2 | OXYGEN SATURATION: 97 % | WEIGHT: 231.04 LBS | SYSTOLIC BLOOD PRESSURE: 140 MMHG | HEART RATE: 75 BPM | DIASTOLIC BLOOD PRESSURE: 74 MMHG | RESPIRATION RATE: 16 BRPM | TEMPERATURE: 98.4 F | HEIGHT: 70 IN

## 2024-09-05 DIAGNOSIS — R05.8 PRODUCTIVE COUGH: ICD-10-CM

## 2024-09-05 DIAGNOSIS — C61 PROSTATE CANCER (H): Primary | ICD-10-CM

## 2024-09-05 DIAGNOSIS — C79.51 METASTASIS TO BONE (H): ICD-10-CM

## 2024-09-05 DIAGNOSIS — Z51.11 ENCOUNTER FOR ANTINEOPLASTIC CHEMOTHERAPY: ICD-10-CM

## 2024-09-05 DIAGNOSIS — C61 PROSTATE CANCER (H): ICD-10-CM

## 2024-09-05 DIAGNOSIS — C77.8 MALIGNANT NEOPLASM METASTATIC TO LYMPH NODES OF MULTIPLE SITES (H): ICD-10-CM

## 2024-09-05 DIAGNOSIS — C77.8 MALIGNANT NEOPLASM METASTATIC TO LYMPH NODES OF MULTIPLE SITES (H): Primary | ICD-10-CM

## 2024-09-05 LAB
ALBUMIN SERPL BCG-MCNC: 3.1 G/DL (ref 3.5–5.2)
ALP SERPL-CCNC: 170 U/L (ref 40–150)
ALT SERPL W P-5'-P-CCNC: 28 U/L (ref 0–70)
ANION GAP SERPL CALCULATED.3IONS-SCNC: 8 MMOL/L (ref 7–15)
AST SERPL W P-5'-P-CCNC: 36 U/L (ref 0–45)
BASOPHILS # BLD AUTO: 0 10E3/UL (ref 0–0.2)
BASOPHILS NFR BLD AUTO: 1 %
BILIRUB SERPL-MCNC: 0.9 MG/DL
BUN SERPL-MCNC: 15.5 MG/DL (ref 8–23)
CALCIUM SERPL-MCNC: 8.8 MG/DL (ref 8.8–10.4)
CHLORIDE SERPL-SCNC: 107 MMOL/L (ref 98–107)
CREAT SERPL-MCNC: 0.91 MG/DL (ref 0.67–1.17)
EGFRCR SERPLBLD CKD-EPI 2021: 89 ML/MIN/1.73M2
EOSINOPHIL # BLD AUTO: 0 10E3/UL (ref 0–0.7)
EOSINOPHIL NFR BLD AUTO: 1 %
ERYTHROCYTE [DISTWIDTH] IN BLOOD BY AUTOMATED COUNT: 16.9 % (ref 10–15)
GLUCOSE SERPL-MCNC: 101 MG/DL (ref 70–99)
HCO3 SERPL-SCNC: 25 MMOL/L (ref 22–29)
HCT VFR BLD AUTO: 30.9 % (ref 40–53)
HGB BLD-MCNC: 10 G/DL (ref 13.3–17.7)
IMM GRANULOCYTES # BLD: 0 10E3/UL
IMM GRANULOCYTES NFR BLD: 0 %
LYMPHOCYTES # BLD AUTO: 0.5 10E3/UL (ref 0.8–5.3)
LYMPHOCYTES NFR BLD AUTO: 11 %
MCH RBC QN AUTO: 30 PG (ref 26.5–33)
MCHC RBC AUTO-ENTMCNC: 32.4 G/DL (ref 31.5–36.5)
MCV RBC AUTO: 93 FL (ref 78–100)
MONOCYTES # BLD AUTO: 0.4 10E3/UL (ref 0–1.3)
MONOCYTES NFR BLD AUTO: 9 %
NEUTROPHILS # BLD AUTO: 3.5 10E3/UL (ref 1.6–8.3)
NEUTROPHILS NFR BLD AUTO: 78 %
NRBC # BLD AUTO: 0 10E3/UL
NRBC BLD AUTO-RTO: 0 /100
PLATELET # BLD AUTO: 95 10E3/UL (ref 150–450)
POTASSIUM SERPL-SCNC: 4.3 MMOL/L (ref 3.4–5.3)
PROT SERPL-MCNC: 5.8 G/DL (ref 6.4–8.3)
PSA SERPL DL<=0.01 NG/ML-MCNC: 6.33 NG/ML (ref 0–6.5)
RBC # BLD AUTO: 3.33 10E6/UL (ref 4.4–5.9)
SODIUM SERPL-SCNC: 140 MMOL/L (ref 135–145)
WBC # BLD AUTO: 4.5 10E3/UL (ref 4–11)

## 2024-09-05 PROCEDURE — G0463 HOSPITAL OUTPT CLINIC VISIT: HCPCS | Performed by: INTERNAL MEDICINE

## 2024-09-05 PROCEDURE — 99214 OFFICE O/P EST MOD 30 MIN: CPT | Performed by: INTERNAL MEDICINE

## 2024-09-05 PROCEDURE — 96377 APPLICATON ON-BODY INJECTOR: CPT | Mod: 59 | Performed by: INTERNAL MEDICINE

## 2024-09-05 PROCEDURE — 84403 ASSAY OF TOTAL TESTOSTERONE: CPT | Performed by: INTERNAL MEDICINE

## 2024-09-05 PROCEDURE — 36591 DRAW BLOOD OFF VENOUS DEVICE: CPT | Performed by: INTERNAL MEDICINE

## 2024-09-05 PROCEDURE — 85025 COMPLETE CBC W/AUTO DIFF WBC: CPT | Performed by: INTERNAL MEDICINE

## 2024-09-05 PROCEDURE — 96372 THER/PROPH/DIAG INJ SC/IM: CPT | Performed by: INTERNAL MEDICINE

## 2024-09-05 PROCEDURE — 96402 CHEMO HORMON ANTINEOPL SQ/IM: CPT

## 2024-09-05 PROCEDURE — 258N000003 HC RX IP 258 OP 636: Performed by: INTERNAL MEDICINE

## 2024-09-05 PROCEDURE — 250N000011 HC RX IP 250 OP 636: Performed by: INTERNAL MEDICINE

## 2024-09-05 PROCEDURE — 96413 CHEMO IV INFUSION 1 HR: CPT

## 2024-09-05 PROCEDURE — 80053 COMPREHEN METABOLIC PANEL: CPT | Performed by: INTERNAL MEDICINE

## 2024-09-05 PROCEDURE — 96367 TX/PROPH/DG ADDL SEQ IV INF: CPT

## 2024-09-05 PROCEDURE — 71046 X-RAY EXAM CHEST 2 VIEWS: CPT | Mod: GC | Performed by: RADIOLOGY

## 2024-09-05 PROCEDURE — 84153 ASSAY OF PSA TOTAL: CPT | Performed by: INTERNAL MEDICINE

## 2024-09-05 RX ORDER — HEPARIN SODIUM (PORCINE) LOCK FLUSH IV SOLN 100 UNIT/ML 100 UNIT/ML
5 SOLUTION INTRAVENOUS
Status: CANCELLED | OUTPATIENT
Start: 2024-09-05

## 2024-09-05 RX ORDER — ALBUTEROL SULFATE 0.83 MG/ML
2.5 SOLUTION RESPIRATORY (INHALATION)
Status: CANCELLED | OUTPATIENT
Start: 2024-09-05

## 2024-09-05 RX ORDER — ALBUTEROL SULFATE 90 UG/1
1-2 AEROSOL, METERED RESPIRATORY (INHALATION)
Status: CANCELLED
Start: 2024-09-05

## 2024-09-05 RX ORDER — HEPARIN SODIUM (PORCINE) LOCK FLUSH IV SOLN 100 UNIT/ML 100 UNIT/ML
5 SOLUTION INTRAVENOUS ONCE
Status: COMPLETED | OUTPATIENT
Start: 2024-09-05 | End: 2024-09-05

## 2024-09-05 RX ORDER — EPINEPHRINE 1 MG/ML
0.3 INJECTION, SOLUTION INTRAMUSCULAR; SUBCUTANEOUS EVERY 5 MIN PRN
Status: CANCELLED | OUTPATIENT
Start: 2024-09-05

## 2024-09-05 RX ORDER — LORAZEPAM 2 MG/ML
0.5 INJECTION INTRAMUSCULAR EVERY 4 HOURS PRN
Status: CANCELLED | OUTPATIENT
Start: 2024-09-05

## 2024-09-05 RX ORDER — MEPERIDINE HYDROCHLORIDE 25 MG/ML
25 INJECTION INTRAMUSCULAR; INTRAVENOUS; SUBCUTANEOUS EVERY 30 MIN PRN
Status: CANCELLED | OUTPATIENT
Start: 2024-09-05

## 2024-09-05 RX ORDER — HEPARIN SODIUM (PORCINE) LOCK FLUSH IV SOLN 100 UNIT/ML 100 UNIT/ML
5 SOLUTION INTRAVENOUS
Status: DISCONTINUED | OUTPATIENT
Start: 2024-09-05 | End: 2024-09-05 | Stop reason: HOSPADM

## 2024-09-05 RX ORDER — HEPARIN SODIUM,PORCINE 10 UNIT/ML
5-20 VIAL (ML) INTRAVENOUS DAILY PRN
Status: CANCELLED | OUTPATIENT
Start: 2024-09-05

## 2024-09-05 RX ORDER — METHYLPREDNISOLONE SODIUM SUCCINATE 125 MG/2ML
125 INJECTION, POWDER, LYOPHILIZED, FOR SOLUTION INTRAMUSCULAR; INTRAVENOUS
Status: CANCELLED
Start: 2024-09-05

## 2024-09-05 RX ORDER — DIPHENHYDRAMINE HYDROCHLORIDE 50 MG/ML
50 INJECTION INTRAMUSCULAR; INTRAVENOUS
Status: CANCELLED
Start: 2024-09-05

## 2024-09-05 RX ADMIN — Medication 5 ML: at 10:08

## 2024-09-05 RX ADMIN — Medication 5 ML: at 13:17

## 2024-09-05 RX ADMIN — SODIUM CHLORIDE 250 ML: 9 INJECTION, SOLUTION INTRAVENOUS at 11:53

## 2024-09-05 RX ADMIN — PEGFILGRASTIM 6 MG: KIT SUBCUTANEOUS at 13:03

## 2024-09-05 RX ADMIN — DOCETAXEL 138 MG: 20 INJECTION, SOLUTION, CONCENTRATE INTRAVENOUS at 12:12

## 2024-09-05 RX ADMIN — DEXAMETHASONE SODIUM PHOSPHATE: 10 INJECTION, SOLUTION INTRAMUSCULAR; INTRAVENOUS at 11:53

## 2024-09-05 RX ADMIN — LEUPROLIDE ACETATE 22.5 MG: 22.5 INJECTION, SUSPENSION, EXTENDED RELEASE SUBCUTANEOUS at 13:11

## 2024-09-05 ASSESSMENT — PAIN SCALES - GENERAL: PAINLEVEL: NO PAIN (0)

## 2024-09-05 NOTE — NURSING NOTE
"Oncology Rooming Note    September 5, 2024 10:33 AM   Gucci Logan is a 74 year old male who presents for:    Chief Complaint   Patient presents with    Oncology Clinic Visit     RTN for Prostate Cancer    Port Draw     Labs drawn via port by RN in lab. VS taken.      Initial Vitals: BP (!) 140/74 (BP Location: Right arm, Patient Position: Sitting, Cuff Size: Adult Regular)   Pulse 75   Temp 98.4  F (36.9  C) (Oral)   Resp 16   Ht 1.79 m (5' 10.47\")   Wt 104.8 kg (231 lb 0.7 oz)   SpO2 97%   BMI 32.71 kg/m   Estimated body mass index is 32.71 kg/m  as calculated from the following:    Height as of this encounter: 1.79 m (5' 10.47\").    Weight as of this encounter: 104.8 kg (231 lb 0.7 oz). Body surface area is 2.28 meters squared.  No Pain (0) Comment: Data Unavailable   No LMP for male patient.  Allergies reviewed: Yes  Medications reviewed: Yes    Medications: Medication refills not needed today.  Pharmacy name entered into EPIC:    [id number] - [place], [place] - [address] [facility]  [facility] - [place], [place] - [address] [place] [address] [address] [place] [address] - [address]  [facility] - [place], [place] - [address]     Frailty Screening:   Is the patient here for a new oncology consult visit in cancer care? 2. No      Clinical concerns: none       Judie Barrett MA             "

## 2024-09-05 NOTE — PATIENT INSTRUCTIONS
Contact Numbers  [facility]: [phone] (for symptom and scheduling needs)    Please call the [facility] Triage line if you experience a temperature greater than or equal to 100.4, shaking chills, have uncontrolled nausea, vomiting and/or diarrhea, dizziness, shortness of breath, chest pain, bleeding, unexplained bruising, or if you have any other new/concerning symptoms, questions or concerns.     If you are having any concerning symptoms or wish to speak to a provider before your next infusion visit, please call your care coordinator or triage to notify them so we can adequately serve you.     If you need a refill on a narcotic prescription or other medication, please call triage before your infusion appointment.

## 2024-09-05 NOTE — LETTER
9/5/2024      Gucci Logan  [id number] [place] [facility] [address]      Dear Colleague,    Thank you for referring your patient, Gucci Logan, to the [facility]. Please see a copy of my visit note below.      PRIMARY CARE PHYSICIAN: Richi Jaramillo MD  ORTHOPEDIC SURGERY: Rafa Wagner MD   RADIATION ONCOLOGY: Subha Gan MD     HISTORY OF PRESENT ILLNESS: Patient is a 74-year-old male with stage IVB adenocarcinoma prostate (cT2c, cN0, cM1b, PSA 8.81 ng/mL).  Patient had a mechanical fall 05/19/2024, while working in his yard. CT head 05/19/2024, was negative.  CT cervical spine 05/19/2024, was negative for subluxation or fracture.  There was high-grade C5-C6 and C6-C7 central and bilateral foraminal stenosis.  X-ray pelvis and hips 05/19/2024, revealed a left femur fracture at the intertrochanteric/subtrochanteric junction. Left iliac crest bone marrow aspirate and left femur curettage samples at the time of the left hip fracture ORIF 05/24/2024, revealed trilineage hematopoiesis without evidence of dysplasia or increase in blasts.  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  The left femur curettage sample showed metastatic prostate adenocarcinoma that was positive for CK AE1/AE3 and NKX3.1, but negative for CK7, CK20, PAX8, TTF-1, GATA3, SATB2, CDX2, and arginase.  Previous PSA was 1.49 (08/29/2023).  68-Ga PSMA-11 PET/CT scan 07/03/2024, revealed heterogeneous PSMA uptake in the prostate without discrete focality.  There was enlarged and PSMA avid lymphadenopathy including a 2 x 1.6 cm subcarinal lymph node with SUV max 3.8 (SUV mean 2.3), bilateral common iliac and left external iliac lymph nodes including a 1.6 x 1.6 cm left common  iliac lymph node with SUV max 5.3, and a 2.1 x 1.2 cm left external iliac lymph node with SUV max 5.2.  There were multiple PSMA-avid lytic/sclerotic metastases in the axial and appendicular skeleton including bilateral scapulae (right scapula with SUV max 7.1), left humerus, bilateral ribs, spine, pelvis (left posterior iliac with SUV max 9.3), left proximal femur with pathologic fracture and hardware in place..  There is liver was cirrhotic with features of portal hypertension including splenomegaly and dilated upper abdominal portosystemic collaterals. Patient received radiation therapy to the left femur (25 Gy in 5 fractions) from 07/29/2024 through 08/02/2024.  Patient is is receiving first-line androgen deprivation therapy (ADT) consisting of degarelix 240 mg loading dose on 07/29/2024, then leuprolide 22.5 mg every 3 months beginning 08/26/2024, and docetaxel 60 mg/m  IV every 21 days x 6 cycles and darolutamide 600 mg BID beginning 08/14/2024.  There was a docetaxel 60 mg/m  dose reduction due to underlying cirrhosis prior history of hepatitis C.  Patient received 1 cycle of darolutamide/docetaxel thus far (08/14/2024).  Patient was hospitalized 08/05/2024 through 08/13/2024 for right knee pain and swelling due to pseudogout, and recovered sufficiently to proceed with chemotherapy.  Patient has fatigue, weakness, abnormal taste sensation, cough productive of yellowish sputum, chest discomfort with coughing, and occasional nausea.  Patient did not require antiemetics.  Patient has underlying gingival and dental disease with sensitive and occasional bleeding gums.  Patient has started physical therapy and occupational therapy at home but has been concerned about weightbearing because of the left femur fracture and surgery.  There are no other new symptoms or events since the prior clinic visit (07/18/2024). Patient denies fever, anorexia, headache, dyspnea, hemoptysis, abdominal pain, vomiting, constipation,  or diarrhea.     PAST HISTORY: Past history was reviewed and unchanged from the clinic note 07/18/2024.     MEDICATIONS:   Current Outpatient Medications   Medication Sig Dispense Refill     acetaminophen (TYLENOL) 325 MG tablet Take 2 tablets (650 mg) by mouth every 4 hours as needed for other (For optimal non-opioid multimodal pain management to improve pain control.) 100 tablet 0     aspirin 81 MG EC tablet Take 1 tablet (81 mg) by mouth daily 90 tablet 3     calcium citrate (CITRACAL) 950 (200 Ca) MG tablet Take 1 tablet (950 mg) by mouth daily 120 tablet 3     diclofenac (VOLTAREN) 1 % topical gel Apply 4 g topically 3 times daily as needed for moderate pain (bursitis) 150 g 1     metFORMIN (GLUCOPHAGE) 500 MG tablet Take 1 tablet (500 mg) by mouth 2 times daily (with meals). 180 tablet 1     methocarbamol (ROBAXIN) 500 MG tablet Take 1 Tablet (500 mg) by mouth every 6 hours if needed for Muscle Spasm PO 1st choice.       metoprolol succinate ER (TOPROL XL) 100 MG 24 hr tablet 1 1/2 ( 150 mg ) po daily 135 tablet 1     multivitamin w/minerals (THERA-VIT-M) tablet Take 1 tablet by mouth daily       nitroGLYcerin (NITROSTAT) 0.4 MG sublingual tablet For chest pain place 1 tablet under the tongue every 5 minutes for 3 doses. If symptoms persist 5 minutes after 1st dose call 911. 30 tablet 0     omeprazole (PRILOSEC) 20 MG DR capsule TAKE 1 CAPSULE BY MOUTH ONCE DAILY 30 TO 60 MINUTES BEFORE A MEAL 90 capsule 1     ondansetron (ZOFRAN) 8 MG tablet Take 1 tablet (8 mg) by mouth every 8 hours as needed for nausea 60 tablet 5     oxyCODONE (ROXICODONE) 5 MG tablet Take 1-2 tablets (5-10 mg) by mouth every 4 hours as needed for pain. Do not start before August 31, 2024. 70 tablet 0     predniSONE (DELTASONE) 5 MG tablet Take 1 tablet (5 mg) by mouth daily (with breakfast) Do not take prednisone on days when taking dexamethasone. 60 tablet 1     prochlorperazine (COMPAZINE) 10 MG tablet Take 0.5 tablets (5 mg) by mouth  "every 6 hours as needed for nausea or vomiting 60 tablet 5     ranolazine (RANEXA) 500 MG 12 hr tablet Take 1 tablet (500 mg) by mouth 2 times daily. DO NOT CRUSH. 60 tablet 3     senna-docusate (SENOKOT-S/PERICOLACE) 8.6-50 MG tablet Take 1 tablet by mouth 2 times daily as needed for constipation 30 tablet 1     tiZANidine (ZANAFLEX) 4 MG tablet TAKE 1/2 (ONE-HALF) TO 1  TABLET BY MOUTH UP TO THREE TIMES DAILY AS NEEDED (Patient taking differently: Take 2-4 mg by mouth 3 times daily as needed for muscle spasms. Usually takes at 4pm with tramadol) 30 tablet 0     valsartan (DIOVAN) 160 MG tablet Take 1 tablet (160 mg) by mouth daily 90 tablet 1     vitamin D3 (CHOLECALCIFEROL) 50 mcg (2000 units) tablet Take 1 tablet (50 mcg) by mouth daily 120 tablet 3       REVIEW OF SYSTEMS: Review of systems reviewed with the patient and otherwise negative except for those detailed above.    PHYSICAL EXAM: BP (!) 140/74 (BP Location: Right arm, Patient Position: Sitting, Cuff Size: Adult Regular)   Pulse 75   Temp 98.4  F (36.9  C) (Oral)   Resp 16   Ht 1.79 m (5' 10.47\")   Wt 104.8 kg (231 lb 0.7 oz)   SpO2 97%   BMI 32.71 kg/m  .  Body surface area is 2.28 meters squared. ECOG performance status: 2.  Physical exam was unchanged from prior clinic visit 07/18/2024.  Skin: No erythema or rash.  HEENT: Sclera nonicteric. Oropharynx without lesions or ulceration, mucosa pink and moist.  Nodes: No cervical, supraclavicular, axillary, or inguinal adenopathy.  Lungs: No dullness to percussion.  No rales, wheezes, rhonchi.  Heart: Regular rate and rhythm.  Abdomen: Bowel sounds present.  Soft, nontender, no hepatosplenomegaly or mass.  Extremities: 2+ lower extremity edema.     LABORATORY:   Component      Latest Ref Rng 8/14/2024  12:19 PM 9/5/2024  10:21 AM   WBC      4.0 - 11.0 10e3/uL  4.5    RBC Count      4.40 - 5.90 10e6/uL  3.33 (L)    Hemoglobin      13.3 - 17.7 g/dL  10.0 (L)    Hematocrit      40.0 - 53.0 %  30.9 (L)  "   MCV      78 - 100 fL  93    MCH      26.5 - 33.0 pg  30.0    MCHC      31.5 - 36.5 g/dL  32.4    RDW      10.0 - 15.0 %  16.9 (H)    Platelet Count      150 - 450 10e3/uL  95 (L)    % Neutrophils      %  78    % Lymphocytes      %  11    % Monocytes      %  9    % Eosinophils      %  1    % Basophils      %  1    % Immature Granulocytes      %  0    NRBCs per 100 WBC      <1 /100  0    Absolute Neutrophils      1.6 - 8.3 10e3/uL  3.5    Absolute Lymphocytes      0.8 - 5.3 10e3/uL  0.5 (L)    Absolute Monocytes      0.0 - 1.3 10e3/uL  0.4    Absolute Eosinophils      0.0 - 0.7 10e3/uL  0.0    Absolute Basophils      0.0 - 0.2 10e3/uL  0.0    Absolute Immature Granulocytes      <=0.4 10e3/uL  0.0    Absolute NRBCs      10e3/uL  0.0    Sodium      135 - 145 mmol/L  140    Potassium      3.4 - 5.3 mmol/L  4.3    Carbon Dioxide (CO2)      22 - 29 mmol/L  25    Anion Gap      7 - 15 mmol/L  8    Urea Nitrogen      8.0 - 23.0 mg/dL  15.5    Creatinine      0.67 - 1.17 mg/dL  0.91    GFR Estimate      >60 mL/min/1.73m2  89    Calcium      8.8 - 10.4 mg/dL  8.8    Chloride      98 - 107 mmol/L  107    Glucose      70 - 99 mg/dL  101 (H)    Alkaline Phosphatase      40 - 150 U/L  170 (H)    AST      0 - 45 U/L  36    ALT      0 - 70 U/L  28    Protein Total      6.4 - 8.3 g/dL  5.8 (L)    Albumin      3.5 - 5.2 g/dL  3.1 (L)    Bilirubin Total      <=1.2 mg/dL  0.9    PSA Tumor Marker      0.00 - 6.50 ng/mL 16.86 (H)  6.33        IMPRESSION/PLAN: Stage IVB adenocarcinoma prostate.  Patient sustained a intertrochanteric left femur fracture after a mechanical fall at home.  Left femur curettage revealed metastatic adenocarcinoma, prostate primary.  There are large retroperitoneal and pelvic lymph node metastases diffuse skeletal metastases noted on PSMA PET/CT scan (07/03/2024).  Patient has high-volume metastatic disease and he is receiving first-line ADT with leuprolide every 3 months (beginning 07/29/2024) and docetaxel 60  mg/m  IV every 21 days x 6 cycles and darolutamide 600 mg BID (beginning 08/14/2024) in accordance with the ARASENS clinical trial (N Engl J Med 2022;386:[id number]).  There is grade 1 fatigue, weakness, dysgeusia, cough, nausea, anemia, and thrombocytopenia.  Darolutamide/docetaxel will be continued without modification.  Patient return to ambulatory infusion center 09/05/2024, for cycle 2 of docetaxel/darolutamide. I reviewed the risk and side effects of docetaxel with the patient, which include fatigue, anorexia, weight loss, alopecia, mouth sores, nausea, vomiting, diarrhea, myalgias, edema, neuropathy, cytopenia, infection, bleeding, and allergic or anaphylactic reaction.  Darolutamide is associated with fatigue, rash, myalgias, forgetfulness, and leuprolide is associated with fatigue, hot flashes, weakness, loss of muscle mass, weight gain, forgetfulness, depression, breast enlargement, joint stiffness and pain, coronary artery disease, osteoporosis, and bone fracture.  Patient understood the indication and risks and agreed to continue therapy.  Pegfilgrastim will be administered while in the after docetaxel to speed neutrophil recovery.  Chest x-ray will be performed today to evaluate the productive cough.  Patient should continue physical therapy and occupational therapy to improve balance, strength, mobility, and home safety.  Patient will return to clinic in 3 weeks with CBC, metabolic panel, PSA.  The current and past history obtained from the patient and medical records, clinical evaluation, reviewing diagnostic tests and viewing images with the patient, and assessment and planning occurred over 30 minutes.       Souleymane Plunkett MD    cc: MD Rafa Duvall MD Chinsoo Lawrence Cho, MD      Again, thank you for allowing me to participate in the care of your patient.        Sincerely,        Souleymane Plunkett MD

## 2024-09-05 NOTE — PROGRESS NOTES
Infusion Nursing Note:  Gucci Logan presents today for C2D1 Taxotere and Eligard.    Patient seen by provider today: Yes, Dr. Plunkett   present during visit today: Not Applicable.    Note: Canelo presents to infusion today following his clinic appointment. He denies any new concerns. He is going to get a chest X-ray following infusion.    He is taking his prednisone and oral chemo as prescribed.     Intravenous Access:  Implanted Port.    Treatment Conditions:  Lab Results   Component Value Date    HGB 10.0 (L) 09/05/2024    WBC 4.5 09/05/2024    ANEU 2.1 08/21/2024    ANEUTAUTO 3.5 09/05/2024    PLT 95 (L) 09/05/2024     Lab Results   Component Value Date     09/05/2024    POTASSIUM 4.3 09/05/2024    MAG 1.8 08/21/2024    CR 0.91 09/05/2024    SOLEDAD 8.8 09/05/2024    BILITOTAL 0.9 09/05/2024    ALBUMIN 3.1 (L) 09/05/2024    ALT 28 09/05/2024    AST 36 09/05/2024     Results reviewed, labs MET treatment parameters, ok to proceed with treatment.    Post Infusion Assessment:  Patient tolerated infusion without incident.  Patient tolerated injection to LLQ abdomen without incident.  Blood return noted pre and post infusion.  Site patent and intact, free from redness, edema or discomfort.  No evidence of extravasations.  Access discontinued per protocol.     Neulasta Onpro On-Body injector applied to RLQ abdomen at 1305.  Writer discussed Neulasta injection would start tomorrow 9/6 at 1605, approximately 27 hours after application applied today.    Written and Verbal instruction reviewed with patient.  Pt instructed when the dose delivery starts, it will take about 45 minutes to complete.  Pt aware Neulasta Onpro On-Body should have green flashing light and to call triage or on-call MD if injector flashes red or appears to be leaking.   Pt aware to keep Onpro On-Body Neulasta 4 inches away from electrical equipment and to avoid showering 4 hours prior to injection.     Discharge Plan:   Patient  declined prescription refills. Confirmed with oral chemo pharmacist they ordered his chemo today and he will get it soon.   Discharge instructions reviewed with: Patient.  Patient and/or family verbalized understanding of discharge instructions and all questions answered.  Copy of AVS reviewed with patient and/or family.  Patient will return 9/24 for next appointment.  Patient discharged in stable condition accompanied by: self.  Departure Mode: Wheelchair.      Keshia Guardado RN

## 2024-09-05 NOTE — NURSING NOTE
Chief Complaint   Patient presents with    Oncology Clinic Visit     RTN for Prostate Cancer    Port Draw     Labs drawn via port by RN in lab. VS taken.      Labs drawn via port by RN. Port accessed with 20g flat needle. Flushed with saline and heparin. Pt tolerated well. Vitals taken. Pt checked into next appt.     Karie Yadav RN

## 2024-09-06 ENCOUNTER — PATIENT OUTREACH (OUTPATIENT)
Dept: ONCOLOGY | Facility: CLINIC | Age: 74
End: 2024-09-06
Payer: COMMERCIAL

## 2024-09-06 LAB — TESTOST SERPL-MCNC: 14 NG/DL (ref 240–950)

## 2024-09-06 NOTE — PROGRESS NOTES
[facility]: Cancer Care                                                                                          Patient called with questions regarding chest xray - relayed results.  Patient had questions regarding PT/OT and if Dr. Plunkett talked with Dr. Laguna regarding toe-touch weightbearing or full weightbearing.  Discussed with patient that Dr. Wagner ordered WBAT.  Will follow-up.    Piedad Rivas RN  Cancer Care Coordinator  [facility]

## 2024-09-09 ENCOUNTER — MEDICAL CORRESPONDENCE (OUTPATIENT)
Dept: HEALTH INFORMATION MANAGEMENT | Facility: CLINIC | Age: 74
End: 2024-09-09

## 2024-09-10 ENCOUNTER — PATIENT OUTREACH (OUTPATIENT)
Dept: ONCOLOGY | Facility: CLINIC | Age: 74
End: 2024-09-10
Payer: COMMERCIAL

## 2024-09-10 ENCOUNTER — MEDICAL CORRESPONDENCE (OUTPATIENT)
Dept: HEALTH INFORMATION MANAGEMENT | Facility: CLINIC | Age: 74
End: 2024-09-10
Payer: COMMERCIAL

## 2024-09-10 NOTE — PROGRESS NOTES
[facility]: Cancer Care                                                                                          Received message that patient has questions regarding orthopedic surgery & gil money (oral chemo).  Called patient and provided ortho phone number - [phone] as well as pharmacy liaison phone number - [phone].    Patient also reported he started having diarrhea today but he ate too much recently & also took a laxative last evening.  Advised patient to call nurse triage line if diarrhea persists.  Patient verbalized understanding.    Piedad Rivas RN  Cancer Care Coordinator  [facility]

## 2024-09-11 ENCOUNTER — NURSE TRIAGE (OUTPATIENT)
Dept: ONCOLOGY | Facility: CLINIC | Age: 74
End: 2024-09-11

## 2024-09-11 ENCOUNTER — MEDICAL CORRESPONDENCE (OUTPATIENT)
Dept: HEALTH INFORMATION MANAGEMENT | Facility: CLINIC | Age: 74
End: 2024-09-11

## 2024-09-11 ENCOUNTER — VIRTUAL VISIT (OUTPATIENT)
Dept: FAMILY MEDICINE | Facility: CLINIC | Age: 74
End: 2024-09-11
Payer: COMMERCIAL

## 2024-09-11 DIAGNOSIS — Z87.81 S/P LEFT HIP FRACTURE: Primary | ICD-10-CM

## 2024-09-11 DIAGNOSIS — C61 PROSTATE CANCER METASTATIC TO BONE (H): ICD-10-CM

## 2024-09-11 DIAGNOSIS — K62.89 ANAL IRRITATION: ICD-10-CM

## 2024-09-11 DIAGNOSIS — C79.51 PROSTATE CANCER METASTATIC TO BONE (H): ICD-10-CM

## 2024-09-11 DIAGNOSIS — R19.7 DIARRHEA, UNSPECIFIED TYPE: ICD-10-CM

## 2024-09-11 PROCEDURE — 99442 PR PHYSICIAN TELEPHONE EVALUATION 11-20 MIN: CPT | Mod: 93 | Performed by: FAMILY MEDICINE

## 2024-09-11 RX ORDER — OXYCODONE HYDROCHLORIDE 5 MG/1
5-10 TABLET ORAL EVERY 6 HOURS PRN
Qty: 70 TABLET | Refills: 0 | Status: SHIPPED | OUTPATIENT
Start: 2024-09-11

## 2024-09-11 NOTE — TELEPHONE ENCOUNTER
Checking BP and plus BID- usually around 7am and 7pm.   Pulse last few days 105, 94, 95  Blood pressures: 145/71, 153/70  Temperature 97.7    Dr. Richi Jaramillo manages blood pressure medications.   Pt is going to talk to Dr. Jaramillo this afternoon and plans to follow up with him about elevated pulse and blood pressure, but wondered oncology thoughts of it.   Writer educated increase in pulse could occur after exertion, with infection, but also could be changes from treatment/medications/physical body changes.     Cough was reported to Dr. Plunkett and had CXR last week Pt reports cough has improved some, but last night/this morning he spit up in towel last night and was light green production. Pt wondering if abx would be okay while on chemo treatment?   Writer informed abx can be used while on treatment, but provider would need to check for drug interactions and educated abx could make diarrhea worse, would need providers input. Pt voiced understanding, said again he plans to talk to PCP this afternoon, but wanted oncology's input.

## 2024-09-11 NOTE — PATIENT INSTRUCTIONS
Go down to max of 5 pain pills daily for the next week, then max of 4 per day the week after that     Goal is to be off in one month    Do a phone visit with me in 2 to 2 1/2 weeks Dr Jaramillo    Use imodium carefully; don't want to overdo it    Cat serves as  animal

## 2024-09-11 NOTE — TELEPHONE ENCOUNTER
Oncology Nurse Triage - Diarrhea    Situation:   Gucci reporting Diarrhea    Background:   Treating Provider:   Dr Plunkett     Date of last office visit: 9/5/24     Is patient on chemotherapy or immunotherapy? Yes: nubeqa  Which medication: Nubeqa    Has patient started any new medications: No  - if yes, what is the new medication:  N/A    Is patient currently on or recently on antibiotics: No  - if yes, what is the medication:  N/A    Does patient have history of Clostridium difficile infection: No    Is patient receiving radiation to the pelvis area: Yes back in July       Assessment  Onset of symptoms: started on sunday took senna after eating big meal.      Duration of symptoms: 3 days     Number of bowel movements in 24 hour period: 10     Bowel Characteristics:     Color:   Brown    Consistency:   Liquid with some formed stool     Difficult passing stool:  No   No, patient denies straining during bowel movement.     Passing gas: Yes    Associated symptoms:some abdominal pain     Oral intake: when he eats he has to have a BM.     Decrease in urine output:  No     Did you take a laxative or stool softener recently? Yes   Took senna +,  5 tablets on Sunday night   Have you taken anything to treat the diarrhea: No   Which medication and how much? None     Recommendations:     This writer educated on:   Take two capsules of Imodium at onset of diarrhea  Take one capsule after each unformed stool (Maximum of 8 capsules in 24hour period)  Avoid fried, fatty, greasy, and spicy foods  Avoid high fiber foods  Avoid caffeine, alcohol, and milk products  Apply over the counter skin barrier to protect rectum from irritation and breakdown (such as Aquaphor, Diaper rash cream).   Call Triage back if no improvement or worsening of diarrhea in next couple days.

## 2024-09-11 NOTE — PROGRESS NOTES
Canelo is a 74 year old who is being evaluated via a billable telephone visit.    What phone number would you like to be contacted at? [phone]  How would you like to obtain your AVS? Mail a copy  Originating Location (pt. Location): Home    Distant Location (provider location):  On-site        Subjective   Canelo is a 74 year old, presenting for the following health issues:  RECHECK        9/11/2024     2:54 PM   Additional Questions   Roomed by Sofia Conde     History of Present Illness       Reason for visit:  Follow up   He is taking medications regularly.        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    Physical Exam   General: Alert and no distress //Respiratory: No audible wheeze, cough, or shortness of breath // Psychiatric:  Appropriate affect, tone, and pace of words    ASSESSMENT / PLAN:  (Z87.81) S/p left hip fracture  (primary encounter diagnosis)  Comment: discussed in detail.  Patient advised to start cutting down on pain med usage.  No more than 5 per day, then taper from there.  Goal is to be off in one month.   Plan: oxyCODONE (ROXICODONE) 5 MG tablet             (C61,  C79.51) Prostate cancer metastatic to bone (H)  Comment: as  above   Plan: oxyCODONE (ROXICODONE) 5 MG tablet           Do another phone visit in 2 to 2 1/2 weeks     (K62.89) Anal irritation  Comment: advised desitin type cream / ointment   Plan: as above     (R19.7) Diarrhea, unspecified type  Comment: use imodium but sparingly   Plan:  as above       I reviewed the patient's medications, allergies, medical history, family history, and social history.    Richi Jaramillo MD          Phone call duration: 17 minutes  Signed Electronically by: Richi Jaramillo MD

## 2024-09-16 ENCOUNTER — NURSE TRIAGE (OUTPATIENT)
Dept: ONCOLOGY | Facility: CLINIC | Age: 74
End: 2024-09-16
Payer: COMMERCIAL

## 2024-09-16 NOTE — TELEPHONE ENCOUNTER
Oncology Nurse Triage    Situation:   Gucci reporting the following symptoms:  - recent diarrhea and blisters.     Background:   Treating Provider:   Dr. Plunkett    Date of last office visit: 9/5/2024. Dr. Plunkett    Recent Treatments:Nubeqa    Assessment:     Pt states Diarrhea better, but pt is having blisters on underside of left leg/thigh/hip area.   Has weeping blisters, drainage. Color on sheets is clear with occasional blood.     Pt is in a wheelchair so unable to do own wound cares.     Pt lives alone. Unable to take pictures to send.     Has home health care that comes in with home health aide and Therapy.   Nurse comes once or twice a week.   Phone: [phone]    Denies fever, chest pain, SOB.     Pt is concerned with occasional times having stool on self if couldn't make it to bathroom in time if blisters came from cancer medication in stool excreting.     Recommendations:

## 2024-09-16 NOTE — TELEPHONE ENCOUNTER
Pt calling PCP team on same matter.     Pt is asking PCP to increase Home Care for extra wound care with home skilled nursing team. Please read below      Kelley Suarez RN

## 2024-09-16 NOTE — TELEPHONE ENCOUNTER
Called Michaelle, patient's home care nurse from [facility]. Left detailed voice message on identified voicemail box regarding orders, advised to return call at [phone] for any further questions. Provided [employer]'s fax number of [phone] to request any orders be faxed for provider signature if needed.     PETER BrownN CASANDRA  [facility]

## 2024-09-17 ENCOUNTER — MEDICAL CORRESPONDENCE (OUTPATIENT)
Dept: HEALTH INFORMATION MANAGEMENT | Facility: CLINIC | Age: 74
End: 2024-09-17
Payer: COMMERCIAL

## 2024-09-17 NOTE — TELEPHONE ENCOUNTER
Received call from patient. Patient calling as he was advised by [facility] that they are needing further clarification on directions for wound care. Patient is needing this completed as soon as possible as their next visit is 9/19.    Patient confirmed that they had received home care orders for increase in home care for wound care, however, they are needing more specifics.    Attempted to reach [facility] Nurse Supervisor, Michaelle, to get further clarification on what is needed. Left detailed voice message on identified voicemail box, advised to return call at [phone] to further clarify.     When home care calls back, please clarify what additional information is needed, then route to PCP to further clarify.    Routing to team to please advise if any forms or requests were received via fax regarding home care orders.    PEETR BrownN RN  [facility]

## 2024-09-18 ENCOUNTER — TELEPHONE (OUTPATIENT)
Dept: FAMILY MEDICINE | Facility: CLINIC | Age: 74
End: 2024-09-18
Payer: COMMERCIAL

## 2024-09-18 ENCOUNTER — MEDICAL CORRESPONDENCE (OUTPATIENT)
Dept: HEALTH INFORMATION MANAGEMENT | Facility: CLINIC | Age: 74
End: 2024-09-18
Payer: COMMERCIAL

## 2024-09-18 NOTE — LETTER
2024    To whom it may concern:    Gucci Logan dob 52 is a long term patient of mine. He fractured his hip back in May of this year and has a very complicated recovery.  He has multiple medical issues.  He has not been able to drive since May 19, 2024.  Unclear at this time if and when he will be able to drive.    The reference number for [employer] is [id number]             Sincerely,                     Richi Jaramillo MD          Email to:    [email or web address]

## 2024-09-18 NOTE — CONFIDENTIAL NOTE
Patient had sent me a message.  Needs a letter done since he is not able to work.  Did letter.      Please email this letter to listed email address and mail copy to patient     Thanks    Richi Jaramillo MD

## 2024-09-18 NOTE — TELEPHONE ENCOUNTER
Letter has been emailed and copy mailed to patient.     Shira     Transitioned to left side for chest port insertion.

## 2024-09-19 NOTE — PROGRESS NOTES
Virtual Visit Details    Type of service:  Telephone Visit   Phone call duration: 30 minutes   Originating Location (pt. Location): Home    Distant Location (provider location):  On-site    PRIMARY CARE PHYSICIAN: Richi Jaramillo MD  ORTHOPEDIC SURGERY: Rafa Wagner MD   RADIATION ONCOLOGY: Subha Gan MD     HISTORY OF PRESENT ILLNESS: Patient is a 74-year-old male with stage IVB adenocarcinoma prostate (cT2c, cN0, cM1b, PSA 8.81 ng/mL).  Patient had a mechanical fall 05/19/2024, while working in his yard. CT head 05/19/2024, was negative.  CT cervical spine 05/19/2024, was negative for subluxation or fracture.  There was high-grade C5-C6 and C6-C7 central and bilateral foraminal stenosis.  X-ray pelvis and hips 05/19/2024, revealed a left femur fracture at the intertrochanteric/subtrochanteric junction. Left iliac crest bone marrow aspirate and left femur curettage samples at the time of the left hip fracture ORIF 05/24/2024, revealed trilineage hematopoiesis without evidence of dysplasia or increase in blasts.  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  The left femur curettage sample showed metastatic prostate adenocarcinoma that was positive for CK AE1/AE3 and NKX3.1, but negative for CK7, CK20, PAX8, TTF-1, GATA3, SATB2, CDX2, and arginase.  Previous PSA was 1.49 (08/29/2023).  68-Ga PSMA-11 PET/CT scan 07/03/2024, revealed heterogeneous PSMA uptake in the prostate without discrete focality.  There was enlarged and PSMA avid lymphadenopathy including a 2 x 1.6 cm subcarinal lymph node with SUV max 3.8 (SUV mean 2.3), bilateral common iliac and left external iliac lymph nodes including a 1.6 x 1.6 cm left common iliac lymph node with SUV max 5.3, and a 2.1 x 1.2 cm left  external iliac lymph node with SUV max 5.2.  There were multiple PSMA-avid lytic/sclerotic metastases in the axial and appendicular skeleton including bilateral scapulae (right scapula with SUV max 7.1), left humerus, bilateral ribs, spine, pelvis (left posterior iliac with SUV max 9.3), left proximal femur with pathologic fracture and hardware in place..  There is liver was cirrhotic with features of portal hypertension including splenomegaly and dilated upper abdominal portosystemic collaterals. Patient received radiation therapy to the left femur (25 Gy in 5 fractions) from 07/29/2024 through 08/02/2024.  Patient is is receiving first-line androgen deprivation therapy (ADT) consisting of degarelix 240 mg loading dose on 07/29/2024, then leuprolide 22.5 mg every 3 months beginning 08/26/2024, and docetaxel 60 mg/m  IV every 21 days x6 cycles and darolutamide 600 mg BID beginning 08/14/2024.  There was a docetaxel 60 mg/m  dose reduction due to underlying cirrhosis prior history of hepatitis C.  Patient received 2 cycles of darolutamide/docetaxel thus far (09/05/2024).  Patient developed painful blisters in the posterior left thigh extending from below the buttock to the popliteal area.  The blisters have become unroofed and are weeping.  Patient has local wound care through home health.  Patient has fatigue, weakness, abnormal taste sensation, cough productive of yellowish sputum, occasional nausea and a few episodes of diarrhea after the docetaxel infusion.  Patient did not require antiemetics.  Patient has started physical therapy and occupational therapy at home but has restrictions regarding weightbearing until there is adequate healing of the left femur fracture and surgery.  There are no other new symptoms or events since the prior clinic visit (09/05/2024). Patient denies fever, anorexia, headache, dyspnea, hemoptysis, abdominal pain, vomiting, constipation, or diarrhea.     PAST HISTORY: Past history was  reviewed and unchanged from the clinic note 07/18/2024.     MEDICATIONS:   Current Outpatient Medications   Medication Sig Dispense Refill    acetaminophen (TYLENOL) 325 MG tablet Take 2 tablets (650 mg) by mouth every 4 hours as needed for other (For optimal non-opioid multimodal pain management to improve pain control.) 100 tablet 0    aspirin 81 MG EC tablet Take 1 tablet (81 mg) by mouth daily 90 tablet 3    calcium citrate (CITRACAL) 950 (200 Ca) MG tablet Take 1 tablet (950 mg) by mouth daily 120 tablet 3    darolutamide (NUBEQA) 300 MG tablet Take 2 tablets (600 mg) by mouth 2 times daily for 21 days. . Swallow tablets whole. Take with food. Avoid grapefruit or grapefruit juice. 84 tablet 0    diclofenac (VOLTAREN) 1 % topical gel Apply 4 g topically 3 times daily as needed for moderate pain (bursitis) 150 g 1    metFORMIN (GLUCOPHAGE) 500 MG tablet Take 1 tablet (500 mg) by mouth 2 times daily (with meals). 180 tablet 1    methocarbamol (ROBAXIN) 500 MG tablet Take 1 Tablet (500 mg) by mouth every 6 hours if needed for Muscle Spasm PO 1st choice.      metoprolol succinate ER (TOPROL XL) 100 MG 24 hr tablet 1 1/2 ( 150 mg ) po daily 135 tablet 1    multivitamin w/minerals (THERA-VIT-M) tablet Take 1 tablet by mouth daily      nitroGLYcerin (NITROSTAT) 0.4 MG sublingual tablet For chest pain place 1 tablet under the tongue every 5 minutes for 3 doses. If symptoms persist 5 minutes after 1st dose call 911. 30 tablet 0    omeprazole (PRILOSEC) 20 MG DR capsule TAKE 1 CAPSULE BY MOUTH ONCE DAILY 30 TO 60 MINUTES BEFORE A MEAL 90 capsule 1    ondansetron (ZOFRAN) 8 MG tablet Take 1 tablet (8 mg) by mouth every 8 hours as needed for nausea 60 tablet 5    oxyCODONE (ROXICODONE) 5 MG tablet Take 1-2 tablets (5-10 mg) by mouth every 6 hours as needed for pain. 70 tablet 0    predniSONE (DELTASONE) 5 MG tablet Take 1 tablet (5 mg) by mouth daily (with breakfast) Do not take prednisone on days when taking dexamethasone.  60 tablet 1    prochlorperazine (COMPAZINE) 10 MG tablet Take 0.5 tablets (5 mg) by mouth every 6 hours as needed for nausea or vomiting 60 tablet 5    ranolazine (RANEXA) 500 MG 12 hr tablet Take 1 tablet (500 mg) by mouth 2 times daily. DO NOT CRUSH. 60 tablet 3    senna-docusate (SENOKOT-S/PERICOLACE) 8.6-50 MG tablet Take 1 tablet by mouth 2 times daily as needed for constipation 30 tablet 1    tiZANidine (ZANAFLEX) 4 MG tablet TAKE 1/2 (ONE-HALF) TO 1  TABLET BY MOUTH UP TO THREE TIMES DAILY AS NEEDED FOR MUSCLE PAIN 40 tablet 0    valsartan (DIOVAN) 160 MG tablet Take 1 tablet (160 mg) by mouth daily 90 tablet 1    vitamin D3 (CHOLECALCIFEROL) 50 mcg (2000 units) tablet Take 1 tablet (50 mcg) by mouth daily 120 tablet 3       REVIEW OF SYSTEMS: Review of systems reviewed with the patient and otherwise negative except for those detailed above.    PHYSICAL EXAM: Not done. Telehealth visit. There were no vitals taken for this visit..  There is no height or weight on file to calculate BSA.     LABORATORY:   Component      Latest Ref Rng 6/28/2024  7:39 AM 8/14/2024  12:19 PM 9/5/2024  10:21 AM   Testosterone Total      240 - 950 ng/dL 263  16 (L)  14 (L)    PSA Tumor Marker      0.00 - 6.50 ng/mL 8.81 (H)  16.86 (H)  6.33        IMPRESSION/PLAN: 1.  Stage IVB adenocarcinoma prostate.  Patient sustained a intertrochanteric left femur fracture after a mechanical fall at home.  Left femur curettage revealed metastatic adenocarcinoma, prostate primary.  There are large retroperitoneal and pelvic lymph node metastases diffuse skeletal metastases noted on PSMA PET/CT scan (07/03/2024).  Patient has high-volume metastatic disease and he is receiving first-line ADT with leuprolide every 3 months (beginning 07/29/2024) and docetaxel 60 mg/m  IV every 21 days x 6 cycles and darolutamide 600 mg BID (beginning 08/14/2024) in accordance with the ARASENS clinical trial (N Engl J Med 2022;386:6315-4114).  There is a good  biochemical response thus far.  There is grade 1 fatigue, weakness, dysgeusia, cough, nausea, diarrhea, anemia, and thrombocytopenia.  Darolutamide/docetaxel will be continued without modification.  Patient return to ambulatory infusion center 09/26/2024, for cycle 3 of docetaxel/darolutamide. I reviewed the risk and side effects of docetaxel with the patient, which include fatigue, anorexia, weight loss, alopecia, mouth sores, nausea, vomiting, diarrhea, myalgias, edema, neuropathy, cytopenia, infection, bleeding, and allergic or anaphylactic reaction.  Darolutamide is associated with fatigue, rash, myalgias, forgetfulness, and leuprolide is associated with fatigue, hot flashes, weakness, loss of muscle mass, weight gain, forgetfulness, depression, breast enlargement, joint stiffness and pain, coronary artery disease, osteoporosis, and bone fracture.  Patient understood the indication and risks and agreed to continue therapy.  Pegfilgrastim will be administered while in the after docetaxel to speed neutrophil recovery.  Patient should continue physical therapy and occupational therapy to improve balance, strength, mobility, and home safety.  Patient will return to clinic in 3 weeks with CBC, metabolic panel, PSA, restaging 68-Ga PSMA-11 PET/CT scan to evaluate for disease progression.  The current and past history obtained from the patient and medical records, clinical evaluation, reviewing diagnostic tests and viewing images with the patient, and assessment and planning occurred over 30 minutes.   2.  Herpes zoster.  Patient h diarrhea, ad a vesicular eruption with subsequent moist desquamation in the left posterior S2 distribution consistent with herpes zoster.  Patient will receive valacyclovir 1 g TID x7 days.  The valacyclovir prescription was sent to the pharmacy today.      Souleymane Plunkett MD    cc: MD Rafa Duvall MD Chinsoo Lawrence Cho, MD

## 2024-09-19 NOTE — TELEPHONE ENCOUNTER
Action [facility] Requested:   Action Taken EKG Strips  Holter Report  CVL Coronary Angio Images  MR Cardiac Stress Images 1-19-24 12-19-23 1-19-24 12-14-23 9-20 [facility] For imaging library to push images STAT   9-23 Images resolved in PACS

## 2024-09-20 ENCOUNTER — MEDICAL CORRESPONDENCE (OUTPATIENT)
Dept: HEALTH INFORMATION MANAGEMENT | Facility: CLINIC | Age: 74
End: 2024-09-20

## 2024-09-20 NOTE — TELEPHONE ENCOUNTER
3rd attempt to reach home care agency. Called home care. Left detailed voice message on identified voicemail box regarding clarification on what additional information is needed regarding home care orders, advised to return call at [phone] for clarification.    ABI Brown RN  [facility]

## 2024-09-20 NOTE — TELEPHONE ENCOUNTER
Closing encounter as no forms received, and third attempt was completed for call.    PTEER BrownN RN  [facility]

## 2024-09-23 ENCOUNTER — VIRTUAL VISIT (OUTPATIENT)
Dept: ONCOLOGY | Facility: CLINIC | Age: 74
End: 2024-09-23
Attending: INTERNAL MEDICINE
Payer: COMMERCIAL

## 2024-09-23 VITALS — BODY MASS INDEX: 30.49 KG/M2 | WEIGHT: 213 LBS | HEIGHT: 70 IN

## 2024-09-23 DIAGNOSIS — C77.8 MALIGNANT NEOPLASM METASTATIC TO LYMPH NODES OF MULTIPLE SITES (H): ICD-10-CM

## 2024-09-23 DIAGNOSIS — C79.51 METASTASIS TO BONE (H): ICD-10-CM

## 2024-09-23 DIAGNOSIS — B02.9 HERPES ZOSTER WITHOUT COMPLICATION: ICD-10-CM

## 2024-09-23 DIAGNOSIS — C61 PROSTATE CANCER (H): Primary | ICD-10-CM

## 2024-09-23 PROCEDURE — 99443 PR PHYSICIAN TELEPHONE EVALUATION 21-30 MIN: CPT | Mod: 93 | Performed by: INTERNAL MEDICINE

## 2024-09-23 RX ORDER — HEPARIN SODIUM,PORCINE 10 UNIT/ML
5-20 VIAL (ML) INTRAVENOUS DAILY PRN
Status: CANCELLED | OUTPATIENT
Start: 2024-10-01

## 2024-09-23 RX ORDER — EPINEPHRINE 1 MG/ML
0.3 INJECTION, SOLUTION INTRAMUSCULAR; SUBCUTANEOUS EVERY 5 MIN PRN
Status: CANCELLED | OUTPATIENT
Start: 2024-10-01

## 2024-09-23 RX ORDER — ALBUTEROL SULFATE 0.83 MG/ML
2.5 SOLUTION RESPIRATORY (INHALATION)
Status: CANCELLED | OUTPATIENT
Start: 2024-10-01

## 2024-09-23 RX ORDER — METHYLPREDNISOLONE SODIUM SUCCINATE 125 MG/2ML
125 INJECTION, POWDER, LYOPHILIZED, FOR SOLUTION INTRAMUSCULAR; INTRAVENOUS
Status: CANCELLED
Start: 2024-10-01

## 2024-09-23 RX ORDER — VALACYCLOVIR HYDROCHLORIDE 1 G/1
1000 TABLET, FILM COATED ORAL 3 TIMES DAILY
Qty: 21 TABLET | Refills: 0 | Status: SHIPPED | OUTPATIENT
Start: 2024-09-23 | End: 2024-09-30

## 2024-09-23 RX ORDER — MEPERIDINE HYDROCHLORIDE 25 MG/ML
25 INJECTION INTRAMUSCULAR; INTRAVENOUS; SUBCUTANEOUS EVERY 30 MIN PRN
Status: CANCELLED | OUTPATIENT
Start: 2024-10-01

## 2024-09-23 RX ORDER — HEPARIN SODIUM (PORCINE) LOCK FLUSH IV SOLN 100 UNIT/ML 100 UNIT/ML
5 SOLUTION INTRAVENOUS
Status: CANCELLED | OUTPATIENT
Start: 2024-09-26

## 2024-09-23 RX ORDER — LORAZEPAM 2 MG/ML
0.5 INJECTION INTRAMUSCULAR EVERY 4 HOURS PRN
Status: CANCELLED | OUTPATIENT
Start: 2024-10-01

## 2024-09-23 RX ORDER — DIPHENHYDRAMINE HYDROCHLORIDE 50 MG/ML
50 INJECTION INTRAMUSCULAR; INTRAVENOUS
Status: CANCELLED
Start: 2024-10-01

## 2024-09-23 RX ORDER — ALBUTEROL SULFATE 90 UG/1
1-2 AEROSOL, METERED RESPIRATORY (INHALATION)
Status: CANCELLED
Start: 2024-10-01

## 2024-09-23 ASSESSMENT — PAIN SCALES - GENERAL: PAINLEVEL: SEVERE PAIN (6)

## 2024-09-23 NOTE — NURSING NOTE
Is the patient currently in the state of [place]? YES    Visit mode:TELEPHONE    If the visit is dropped, the patient can be reconnected by: VIDEO VISIT: Send to e-mail at: [email or web address]    Will anyone else be joining the visit? NO  (If patient encounters technical issues they should call [phone]    How would you like to obtain your AVS? MyChart    Are changes needed to the allergy or medication list? No  Rooming Documentation:  Questionnaire(s) completed    Reason for visit: Video Visit (Follow Up )    Maria T GRACE

## 2024-09-23 NOTE — LETTER
9/23/2024      Gucci Logan  [id number] [facility] [address]      Dear Colleague,    Thank you for referring your patient, Gucci Logan, to the [facility]. Please see a copy of my visit note below.    Virtual Visit Details    Type of service:  Telephone Visit   Phone call duration: 30 minutes   Originating Location (pt. Location): Home    Distant Location (provider location):  On-site    PRIMARY CARE PHYSICIAN: Richi Jaramillo MD  ORTHOPEDIC SURGERY: Rafa Wagner MD   RADIATION ONCOLOGY: Subha Gan MD     HISTORY OF PRESENT ILLNESS: Patient is a 74-year-old male with stage IVB adenocarcinoma prostate (cT2c, cN0, cM1b, PSA 8.81 ng/mL).  Patient had a mechanical fall 05/19/2024, while working in his yard. CT head 05/19/2024, was negative.  CT cervical spine 05/19/2024, was negative for subluxation or fracture.  There was high-grade C5-C6 and C6-C7 central and bilateral foraminal stenosis.  X-ray pelvis and hips 05/19/2024, revealed a left femur fracture at the intertrochanteric/subtrochanteric junction. Left iliac crest bone marrow aspirate and left femur curettage samples at the time of the left hip fracture ORIF 05/24/2024, revealed trilineage hematopoiesis without evidence of dysplasia or increase in blasts.  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  The left femur curettage sample showed metastatic prostate adenocarcinoma that was positive for CK AE1/AE3 and NKX3.1, but negative for CK7, CK20, PAX8, TTF-1, GATA3, SATB2, CDX2, and arginase.  Previous PSA was 1.49 (08/29/2023).  68-Ga PSMA-11 PET/CT scan 07/03/2024, revealed heterogeneous PSMA uptake in the prostate without discrete focality.  There was enlarged and PSMA avid  lymphadenopathy including a 2 x 1.6 cm subcarinal lymph node with SUV max 3.8 (SUV mean 2.3), bilateral common iliac and left external iliac lymph nodes including a 1.6 x 1.6 cm left common iliac lymph node with SUV max 5.3, and a 2.1 x 1.2 cm left external iliac lymph node with SUV max 5.2.  There were multiple PSMA-avid lytic/sclerotic metastases in the axial and appendicular skeleton including bilateral scapulae (right scapula with SUV max 7.1), left humerus, bilateral ribs, spine, pelvis (left posterior iliac with SUV max 9.3), left proximal femur with pathologic fracture and hardware in place..  There is liver was cirrhotic with features of portal hypertension including splenomegaly and dilated upper abdominal portosystemic collaterals. Patient received radiation therapy to the left femur (25 Gy in 5 fractions) from 07/29/2024 through 08/02/2024.  Patient is is receiving first-line androgen deprivation therapy (ADT) consisting of degarelix 240 mg loading dose on 07/29/2024, then leuprolide 22.5 mg every 3 months beginning 08/26/2024, and docetaxel 60 mg/m  IV every 21 days x6 cycles and darolutamide 600 mg BID beginning 08/14/2024.  There was a docetaxel 60 mg/m  dose reduction due to underlying cirrhosis prior history of hepatitis C.  Patient received 2 cycles of darolutamide/docetaxel thus far (09/05/2024).  Patient developed painful blisters in the posterior left thigh extending from below the buttock to the popliteal area.  The blisters have become unroofed and are weeping.  Patient has local wound care through home health.  Patient has fatigue, weakness, abnormal taste sensation, cough productive of yellowish sputum, occasional nausea and a few episodes of diarrhea after the docetaxel infusion.  Patient did not require antiemetics.  Patient has started physical therapy and occupational therapy at home but has restrictions regarding weightbearing until there is adequate healing of the left femur fracture  and surgery.  There are no other new symptoms or events since the prior clinic visit (09/05/2024). Patient denies fever, anorexia, headache, dyspnea, hemoptysis, abdominal pain, vomiting, constipation, or diarrhea.     PAST HISTORY: Past history was reviewed and unchanged from the clinic note 07/18/2024.     MEDICATIONS:   Current Outpatient Medications   Medication Sig Dispense Refill     acetaminophen (TYLENOL) 325 MG tablet Take 2 tablets (650 mg) by mouth every 4 hours as needed for other (For optimal non-opioid multimodal pain management to improve pain control.) 100 tablet 0     aspirin 81 MG EC tablet Take 1 tablet (81 mg) by mouth daily 90 tablet 3     calcium citrate (CITRACAL) 950 (200 Ca) MG tablet Take 1 tablet (950 mg) by mouth daily 120 tablet 3     darolutamide (NUBEQA) 300 MG tablet Take 2 tablets (600 mg) by mouth 2 times daily for 21 days. . Swallow tablets whole. Take with food. Avoid grapefruit or grapefruit juice. 84 tablet 0     diclofenac (VOLTAREN) 1 % topical gel Apply 4 g topically 3 times daily as needed for moderate pain (bursitis) 150 g 1     metFORMIN (GLUCOPHAGE) 500 MG tablet Take 1 tablet (500 mg) by mouth 2 times daily (with meals). 180 tablet 1     methocarbamol (ROBAXIN) 500 MG tablet Take 1 Tablet (500 mg) by mouth every 6 hours if needed for Muscle Spasm PO 1st choice.       metoprolol succinate ER (TOPROL XL) 100 MG 24 hr tablet 1 1/2 ( 150 mg ) po daily 135 tablet 1     multivitamin w/minerals (THERA-VIT-M) tablet Take 1 tablet by mouth daily       nitroGLYcerin (NITROSTAT) 0.4 MG sublingual tablet For chest pain place 1 tablet under the tongue every 5 minutes for 3 doses. If symptoms persist 5 minutes after 1st dose call 911. 30 tablet 0     omeprazole (PRILOSEC) 20 MG DR capsule TAKE 1 CAPSULE BY MOUTH ONCE DAILY 30 TO 60 MINUTES BEFORE A MEAL 90 capsule 1     ondansetron (ZOFRAN) 8 MG tablet Take 1 tablet (8 mg) by mouth every 8 hours as needed for nausea 60 tablet 5      oxyCODONE (ROXICODONE) 5 MG tablet Take 1-2 tablets (5-10 mg) by mouth every 6 hours as needed for pain. 70 tablet 0     predniSONE (DELTASONE) 5 MG tablet Take 1 tablet (5 mg) by mouth daily (with breakfast) Do not take prednisone on days when taking dexamethasone. 60 tablet 1     prochlorperazine (COMPAZINE) 10 MG tablet Take 0.5 tablets (5 mg) by mouth every 6 hours as needed for nausea or vomiting 60 tablet 5     ranolazine (RANEXA) 500 MG 12 hr tablet Take 1 tablet (500 mg) by mouth 2 times daily. DO NOT CRUSH. 60 tablet 3     senna-docusate (SENOKOT-S/PERICOLACE) 8.6-50 MG tablet Take 1 tablet by mouth 2 times daily as needed for constipation 30 tablet 1     tiZANidine (ZANAFLEX) 4 MG tablet TAKE 1/2 (ONE-HALF) TO 1  TABLET BY MOUTH UP TO THREE TIMES DAILY AS NEEDED FOR MUSCLE PAIN 40 tablet 0     valsartan (DIOVAN) 160 MG tablet Take 1 tablet (160 mg) by mouth daily 90 tablet 1     vitamin D3 (CHOLECALCIFEROL) 50 mcg (2000 units) tablet Take 1 tablet (50 mcg) by mouth daily 120 tablet 3       REVIEW OF SYSTEMS: Review of systems reviewed with the patient and otherwise negative except for those detailed above.    PHYSICAL EXAM: Not done. Telehealth visit. There were no vitals taken for this visit..  There is no height or weight on file to calculate BSA.     LABORATORY:   Component      Latest Ref [facility] 6/28/2024  7:39 AM 8/14/2024  12:19 PM 9/5/2024  10:21 AM   Testosterone Total      240 - 950 ng/dL 263  16 (L)  14 (L)    PSA Tumor Marker      0.00 - 6.50 ng/mL 8.81 (H)  16.86 (H)  6.33        IMPRESSION/PLAN: 1.  Stage IVB adenocarcinoma prostate.  Patient sustained a intertrochanteric left femur fracture after a mechanical fall at home.  Left femur curettage revealed metastatic adenocarcinoma, prostate primary.  There are large retroperitoneal and pelvic lymph node metastases diffuse skeletal metastases noted on PSMA PET/CT scan (07/03/2024).  Patient has high-volume metastatic disease and he is receiving  first-line ADT with leuprolide every 3 months (beginning 07/29/2024) and docetaxel 60 mg/m  IV every 21 days x 6 cycles and darolutamide 600 mg BID (beginning 08/14/2024) in accordance with the ARASENS clinical trial (N Engl J Med 2022;386:4916-1616).  There is a good biochemical response thus far.  There is grade 1 fatigue, weakness, dysgeusia, cough, nausea, diarrhea, anemia, and thrombocytopenia.  Darolutamide/docetaxel will be continued without modification.  Patient return to ambulatory infusion center 09/26/2024, for cycle 3 of docetaxel/darolutamide. I reviewed the risk and side effects of docetaxel with the patient, which include fatigue, anorexia, weight loss, alopecia, mouth sores, nausea, vomiting, diarrhea, myalgias, edema, neuropathy, cytopenia, infection, bleeding, and allergic or anaphylactic reaction.  Darolutamide is associated with fatigue, rash, myalgias, forgetfulness, and leuprolide is associated with fatigue, hot flashes, weakness, loss of muscle mass, weight gain, forgetfulness, depression, breast enlargement, joint stiffness and pain, coronary artery disease, osteoporosis, and bone fracture.  Patient understood the indication and risks and agreed to continue therapy.  Pegfilgrastim will be administered while in the after docetaxel to speed neutrophil recovery.  Patient should continue physical therapy and occupational therapy to improve balance, strength, mobility, and home safety.  Patient will return to clinic in 3 weeks with CBC, metabolic panel, PSA, restaging 68-Ga PSMA-11 PET/CT scan to evaluate for disease progression.  The current and past history obtained from the patient and medical records, clinical evaluation, reviewing diagnostic tests and viewing images with the patient, and assessment and planning occurred over 30 minutes.   2.  Herpes zoster.  Patient h diarrhea, ad a vesicular eruption with subsequent moist desquamation in the left posterior S2 distribution consistent with  herpes zoster.  Patient will receive valacyclovir 1 g TID x7 days.  The valacyclovir prescription was sent to the pharmacy today.      Souleymane Plunkett MD    cc: MD Rafa Duvall MD Chinsoo Lawrence Cho, MD      Again, thank you for allowing me to participate in the care of your patient.        Sincerely,        Souleymane Plunkett MD

## 2024-09-24 ENCOUNTER — TELEPHONE (OUTPATIENT)
Dept: CARDIOLOGY | Facility: CLINIC | Age: 74
End: 2024-09-24

## 2024-09-24 ENCOUNTER — MEDICAL CORRESPONDENCE (OUTPATIENT)
Dept: HEALTH INFORMATION MANAGEMENT | Facility: CLINIC | Age: 74
End: 2024-09-24

## 2024-09-24 ENCOUNTER — INFUSION THERAPY VISIT (OUTPATIENT)
Dept: ONCOLOGY | Facility: CLINIC | Age: 74
End: 2024-09-24
Attending: INTERNAL MEDICINE
Payer: COMMERCIAL

## 2024-09-24 ENCOUNTER — PRE VISIT (OUTPATIENT)
Dept: CARDIOLOGY | Facility: CLINIC | Age: 74
End: 2024-09-24
Payer: COMMERCIAL

## 2024-09-24 ENCOUNTER — APPOINTMENT (OUTPATIENT)
Dept: LAB | Facility: CLINIC | Age: 74
End: 2024-09-24
Attending: INTERNAL MEDICINE
Payer: COMMERCIAL

## 2024-09-24 ENCOUNTER — PATIENT OUTREACH (OUTPATIENT)
Dept: ONCOLOGY | Facility: CLINIC | Age: 74
End: 2024-09-24

## 2024-09-24 VITALS
TEMPERATURE: 98.2 F | SYSTOLIC BLOOD PRESSURE: 117 MMHG | OXYGEN SATURATION: 97 % | HEART RATE: 76 BPM | RESPIRATION RATE: 16 BRPM | DIASTOLIC BLOOD PRESSURE: 69 MMHG

## 2024-09-24 DIAGNOSIS — C61 PROSTATE CANCER (H): Primary | ICD-10-CM

## 2024-09-24 DIAGNOSIS — C77.8 MALIGNANT NEOPLASM METASTATIC TO LYMPH NODES OF MULTIPLE SITES (H): Primary | ICD-10-CM

## 2024-09-24 DIAGNOSIS — E13.9 DIABETES 1.5, MANAGED AS TYPE 2 (H): ICD-10-CM

## 2024-09-24 DIAGNOSIS — Z51.11 ENCOUNTER FOR ANTINEOPLASTIC CHEMOTHERAPY: ICD-10-CM

## 2024-09-24 DIAGNOSIS — C79.51 METASTASIS TO BONE (H): ICD-10-CM

## 2024-09-24 DIAGNOSIS — C61 PROSTATE CANCER (H): ICD-10-CM

## 2024-09-24 PROBLEM — I50.32 CHRONIC DIASTOLIC HEART FAILURE (H): Status: ACTIVE | Noted: 2024-09-24

## 2024-09-24 PROBLEM — I42.1 HOCM (HYPERTROPHIC OBSTRUCTIVE CARDIOMYOPATHY) (H): Status: ACTIVE | Noted: 2024-09-24

## 2024-09-24 LAB
ALBUMIN SERPL BCG-MCNC: 2.9 G/DL (ref 3.5–5.2)
ALP SERPL-CCNC: 137 U/L (ref 40–150)
ALT SERPL W P-5'-P-CCNC: 30 U/L (ref 0–70)
ANION GAP SERPL CALCULATED.3IONS-SCNC: 8 MMOL/L (ref 7–15)
AST SERPL W P-5'-P-CCNC: 42 U/L (ref 0–45)
BASOPHILS # BLD AUTO: 0 10E3/UL (ref 0–0.2)
BASOPHILS NFR BLD AUTO: 1 %
BILIRUB SERPL-MCNC: 0.9 MG/DL
BUN SERPL-MCNC: 22.7 MG/DL (ref 8–23)
CALCIUM SERPL-MCNC: 8.5 MG/DL (ref 8.8–10.4)
CHLORIDE SERPL-SCNC: 110 MMOL/L (ref 98–107)
CREAT SERPL-MCNC: 0.97 MG/DL (ref 0.67–1.17)
EGFRCR SERPLBLD CKD-EPI 2021: 82 ML/MIN/1.73M2
EOSINOPHIL # BLD AUTO: 0 10E3/UL (ref 0–0.7)
EOSINOPHIL NFR BLD AUTO: 0 %
ERYTHROCYTE [DISTWIDTH] IN BLOOD BY AUTOMATED COUNT: 19.2 % (ref 10–15)
GLUCOSE SERPL-MCNC: 117 MG/DL (ref 70–99)
HCO3 SERPL-SCNC: 22 MMOL/L (ref 22–29)
HCT VFR BLD AUTO: 29 % (ref 40–53)
HGB BLD-MCNC: 9.5 G/DL (ref 13.3–17.7)
IMM GRANULOCYTES # BLD: 0 10E3/UL
IMM GRANULOCYTES NFR BLD: 0 %
LYMPHOCYTES # BLD AUTO: 0.6 10E3/UL (ref 0.8–5.3)
LYMPHOCYTES NFR BLD AUTO: 10 %
MCH RBC QN AUTO: 30.6 PG (ref 26.5–33)
MCHC RBC AUTO-ENTMCNC: 32.8 G/DL (ref 31.5–36.5)
MCV RBC AUTO: 94 FL (ref 78–100)
MONOCYTES # BLD AUTO: 0.4 10E3/UL (ref 0–1.3)
MONOCYTES NFR BLD AUTO: 7 %
NEUTROPHILS # BLD AUTO: 4.9 10E3/UL (ref 1.6–8.3)
NEUTROPHILS NFR BLD AUTO: 82 %
NRBC # BLD AUTO: 0 10E3/UL
NRBC BLD AUTO-RTO: 0 /100
PLATELET # BLD AUTO: 73 10E3/UL (ref 150–450)
POTASSIUM SERPL-SCNC: 4.9 MMOL/L (ref 3.4–5.3)
PROT SERPL-MCNC: 5.4 G/DL (ref 6.4–8.3)
PSA SERPL DL<=0.01 NG/ML-MCNC: 4.71 NG/ML (ref 0–6.5)
RBC # BLD AUTO: 3.1 10E6/UL (ref 4.4–5.9)
SODIUM SERPL-SCNC: 140 MMOL/L (ref 135–145)
WBC # BLD AUTO: 5.9 10E3/UL (ref 4–11)

## 2024-09-24 PROCEDURE — 84153 ASSAY OF PSA TOTAL: CPT | Performed by: INTERNAL MEDICINE

## 2024-09-24 PROCEDURE — 36591 DRAW BLOOD OFF VENOUS DEVICE: CPT

## 2024-09-24 PROCEDURE — 80053 COMPREHEN METABOLIC PANEL: CPT | Performed by: INTERNAL MEDICINE

## 2024-09-24 PROCEDURE — 85025 COMPLETE CBC W/AUTO DIFF WBC: CPT | Performed by: INTERNAL MEDICINE

## 2024-09-24 PROCEDURE — G0463 HOSPITAL OUTPT CLINIC VISIT: HCPCS

## 2024-09-24 PROCEDURE — 36591 DRAW BLOOD OFF VENOUS DEVICE: CPT | Performed by: INTERNAL MEDICINE

## 2024-09-24 PROCEDURE — 250N000011 HC RX IP 250 OP 636: Performed by: INTERNAL MEDICINE

## 2024-09-24 RX ORDER — HEPARIN SODIUM (PORCINE) LOCK FLUSH IV SOLN 100 UNIT/ML 100 UNIT/ML
5 SOLUTION INTRAVENOUS ONCE
Status: COMPLETED | OUTPATIENT
Start: 2024-09-24 | End: 2024-09-24

## 2024-09-24 RX ORDER — HEPARIN SODIUM (PORCINE) LOCK FLUSH IV SOLN 100 UNIT/ML 100 UNIT/ML
5 SOLUTION INTRAVENOUS
Status: DISCONTINUED | OUTPATIENT
Start: 2024-09-24 | End: 2024-09-24

## 2024-09-24 RX ADMIN — Medication 5 ML: at 10:48

## 2024-09-24 NOTE — NURSING NOTE
Port accessed with 20g 3/4 inch by RN, labs collected, line flushed with saline and heparin.  Vitals taken. Pt checked in for appointment(s).

## 2024-09-24 NOTE — TELEPHONE ENCOUNTER
9/24 Left Voicemail (1st Attempt) for the patient to call back and schedule the following:    Appointment type: Enrollment Core  Provider: any core provider  Return date: next available  Specialty phone number: [phone] opt 1  Additional appointment(s) needed: labs prior and( labs in 1 week from 9/24)  Additonal Notes: n/a

## 2024-09-24 NOTE — PROGRESS NOTES
Infusion Nursing Note:  Gucci Logan presents today for cycle 3, day 1 docetaxel--Deferred .    Patient seen by provider today: No   present during visit today: Not Applicable.    Note: Patient had a telephone with Dr Plunkett yesterday. Arrived to infusion with no new concerns. Denied any fevers or chills. RN ordered chemotherapy as labs met parameters. Prior to starting premeds, the patient inquired about the valtrex on his medication list. He then showed RN photos on his phone of the sores on his left buttocks and thigh and stated Dr Plunkett thought he had shingles. Patient has not yet started on the valtrex. RN assessed wounds, took photos, uploaded them into patient's chart and paged Dr Plunkett to review.  Patient states his PCP is aware of the sores and he has a homecare nurse coming to do wound care. Patient is unhappy with the dressings the homecare nurse has been using.     9/24/2024 1254 TORB Souleymane Plunkett MD/Arina Pino RN  -I'm unable to review the photos right now  -please have patient start taking valtrex as ordered  -defer chemotherapy one week and have patient see an DONTE prior to infusion next week    Infusion RN attempted to have patient seen by an DONTE  today, but their schedules were full. Also attempted to reach the would care nurse at the [facility]. Offered to have patient return tomorrow for an DONTE visit, but he stated he was unable due to ride availability.  RNCC updated and ordered an urgent wound care referral. Patient given the phone number and instructed to call to make an appointment in the next couple of days. Patient verbalized understanding. Instructed to call with any fevers, chills, or worsening symptoms of infection. He states his homecare nurse will be coming again tomorrow to do wound care and assess.      Intravenous Access:  Implanted Port.    Treatment Conditions:  Lab Results   Component Value Date    HGB 9.5 (L) 09/24/2024    WBC 5.9 09/24/2024    ANEU 2.1 08/21/2024     ANEUTAUTO 4.9 09/24/2024    PLT 73 (L) 09/24/2024        Lab Results   Component Value Date     09/24/2024    POTASSIUM 4.9 09/24/2024    MAG 1.8 08/21/2024    CR 0.97 09/24/2024    SOLEDAD 8.5 (L) 09/24/2024    BILITOTAL 0.9 09/24/2024    ALBUMIN 2.9 (L) 09/24/2024    ALT 30 09/24/2024    AST 42 09/24/2024       Results reviewed, labs MET treatment parameters      Post Infusion Assessment:  Access discontinued per protocol.       Discharge Plan:   Prescription refills given for valtrex (RN had pharmacy transfer script to [facility] and instructed patient to  before leaving and start taking today)  Discharge instructions reviewed with: Patient.  Patient and/or family verbalized understanding of discharge instructions and all questions answered.  Copy of AVS reviewed with patient and/or family.  Patient will return in one week for next appointment, message and IAR sent to scheduling  Patient discharged in stable condition accompanied by: self and patient .  Departure Mode: Wheelchair.      Arina Pino, RN

## 2024-09-24 NOTE — PROGRESS NOTES
[facility]: Cancer Care                                                                                          Wound care referral placed for thigh/buttocks blisters- opened and weeping.  Patient with infusion, provided wound clinic dept number to infusion for patient to call.    Piedad Rivas RN  Cancer Care Coordinator  [facility]

## 2024-09-24 NOTE — PATIENT INSTRUCTIONS
-Call the wound clinic to set up an appointment [phone] in the next couple of days    -Start taking the valtrex as prescribed by Dr Plunkett yesterday  -Call if you have fevers, chills, or worsening symptoms of infection on your leg (worsening pain, redness, drainage)  -We are rescheduling your chemotherapy to next week and you will see an DONTE prior to your infusion. Scheduling will be calling you with these new appointments        [facility] Triage and after hours / weekends / holidays:  [phone]    Please call the triage or after hours line if you experience a temperature greater than or equal to 100.4, shaking chills, have uncontrolled nausea, vomiting and/or diarrhea, dizziness, shortness of breath, chest pain, bleeding, unexplained bruising, or if you have any other new/concerning symptoms, questions or concerns.      If you are having any concerning symptoms or wish to speak to a provider before your next infusion visit, please call triage to notify them so we can adequately serve you.     If you need a refill on a narcotic prescription or other medication, please call before your infusion appointment.             September 2024 Sunday Monday Tuesday Wednesday Thursday Friday Saturday   1     2     3    RETURN HIP   2:25 PM   (20 min.)   Rafa Wagner MD   [facility] 4     5    LAB CENTRAL  10:00 AM   (15 min.)   [facility] LAB DRAW   [facility]    RETURN CCSL  10:15 AM   (30 min.)   Souleymane Plunkett MD   [facility]    ONC INFUSION 1.5 HR (90 MIN)  11:00 AM   (90 min.)    ONC INFUSION NURSE   [facility]    XR GENERAL XRAY   1:10 PM   (20 min.)   [id number]   [facility] [place] 6     7       8     9     10     11    OFFICE VISIT   3:00 PM   (20 min.)   Richi Jaramillo MD   [facility] 12     13     14       15     16     17     18      19     20     21       22     23    RETURN CCSL   9:45 AM   (30 min.)   Souleymane Plunkett MD   [facility] 24    LAB CENTRAL  10:00 AM   (15 min.)    [facility] LAB DRAW   [facility]    NEW GENETIC HEART   10:15 AM   (60 min.)   Raphael Gonsales MD   [facility]    ONC INFUSION 1.5 HR (90 MIN)  11:30 AM   (90 min.)   UC ONC INFUSION NURSE   [facility] 25     26     27     28       29     30 October 2024 Sunday Monday Tuesday Wednesday Thursday Friday Saturday             1     2     3     4     5       6     7     8     9     10     11     12       13     14     15     16    LAB CENTRAL  12:30 PM   (15 min.)    [facility] LAB DRAW   [facility]    ONC INFUSION 2 HR (120 MIN)   1:00 PM   (120 min.)   UC ONC INFUSION NURSE   [facility] 17     18     19       20     21     22     23     24     25     26       27     28     29     30     31                              Recent Results (from the past 24 hour(s))   Comprehensive metabolic panel    Collection Time: 09/24/24 10:44 AM   Result Value Ref Range    Sodium 140 135 - 145 mmol/L    Potassium 4.9 3.4 - 5.3 mmol/L    Carbon Dioxide (CO2) 22 22 - 29 mmol/L    Anion Gap 8 7 - 15 mmol/L    Urea Nitrogen 22.7 8.0 - 23.0 mg/dL    Creatinine 0.97 0.67 - 1.17 mg/dL    GFR Estimate 82 >60 mL/min/1.73m2    Calcium 8.5 (L) 8.8 - 10.4 mg/dL    Chloride 110 (H) 98 - 107 mmol/L    Glucose 117 (H) 70 - 99 mg/dL    Alkaline Phosphatase 137 40 - 150 U/L    AST 42 0 - 45 U/L    ALT 30 0 - 70 U/L    Protein Total 5.4 (L) 6.4 - 8.3 g/dL    Albumin 2.9 (L) 3.5 - 5.2 g/dL    Bilirubin Total 0.9 <=1.2 mg/dL   PSA tumor marker    Collection Time: 09/24/24 10:44 AM   Result Value Ref Range    PSA Tumor Marker 4.71 0.00 - 6.50 ng/mL   CBC with platelets and differential     Collection Time: 09/24/24 10:44 AM   Result Value Ref Range    WBC Count 5.9 4.0 - 11.0 10e3/uL    RBC Count 3.10 (L) 4.40 - 5.90 10e6/uL    Hemoglobin 9.5 (L) 13.3 - 17.7 g/dL    Hematocrit 29.0 (L) 40.0 - 53.0 %    MCV 94 78 - 100 fL    MCH 30.6 26.5 - 33.0 pg    MCHC 32.8 31.5 - 36.5 g/dL    RDW 19.2 (H) 10.0 - 15.0 %    Platelet Count 73 (L) 150 - 450 10e3/uL    % Neutrophils 82 %    % Lymphocytes 10 %    % Monocytes 7 %    % Eosinophils 0 %    % Basophils 1 %    % Immature Granulocytes 0 %    NRBCs per 100 WBC 0 <1 /100    Absolute Neutrophils 4.9 1.6 - 8.3 10e3/uL    Absolute Lymphocytes 0.6 (L) 0.8 - 5.3 10e3/uL    Absolute Monocytes 0.4 0.0 - 1.3 10e3/uL    Absolute Eosinophils 0.0 0.0 - 0.7 10e3/uL    Absolute Basophils 0.0 0.0 - 0.2 10e3/uL    Absolute Immature Granulocytes 0.0 <=0.4 10e3/uL    Absolute NRBCs 0.0 10e3/uL

## 2024-09-25 ENCOUNTER — VIRTUAL VISIT (OUTPATIENT)
Dept: FAMILY MEDICINE | Facility: CLINIC | Age: 74
End: 2024-09-25
Payer: COMMERCIAL

## 2024-09-25 DIAGNOSIS — Z51.11 ENCOUNTER FOR ANTINEOPLASTIC CHEMOTHERAPY: ICD-10-CM

## 2024-09-25 DIAGNOSIS — C61 PROSTATE CANCER METASTATIC TO BONE (H): ICD-10-CM

## 2024-09-25 DIAGNOSIS — C61 PROSTATE CANCER (H): ICD-10-CM

## 2024-09-25 DIAGNOSIS — I50.32 CHRONIC DIASTOLIC HEART FAILURE (H): Primary | ICD-10-CM

## 2024-09-25 DIAGNOSIS — C79.51 PROSTATE CANCER METASTATIC TO BONE (H): ICD-10-CM

## 2024-09-25 DIAGNOSIS — I42.1 HOCM (HYPERTROPHIC OBSTRUCTIVE CARDIOMYOPATHY) (H): ICD-10-CM

## 2024-09-25 DIAGNOSIS — C79.51 METASTASIS TO BONE (H): ICD-10-CM

## 2024-09-25 DIAGNOSIS — R53.1 WEAKNESS: ICD-10-CM

## 2024-09-25 DIAGNOSIS — C77.8 MALIGNANT NEOPLASM METASTATIC TO LYMPH NODES OF MULTIPLE SITES (H): Primary | ICD-10-CM

## 2024-09-25 DIAGNOSIS — R60.0 BILATERAL LOWER EXTREMITY EDEMA: ICD-10-CM

## 2024-09-25 DIAGNOSIS — I10 HYPERTENSION GOAL BP (BLOOD PRESSURE) < 140/90: ICD-10-CM

## 2024-09-25 PROCEDURE — 99443 PR PHYSICIAN TELEPHONE EVALUATION 21-30 MIN: CPT | Mod: 93 | Performed by: FAMILY MEDICINE

## 2024-09-25 RX ORDER — FUROSEMIDE 20 MG
20 TABLET ORAL DAILY
Qty: 30 TABLET | Refills: 1 | Status: SHIPPED | OUTPATIENT
Start: 2024-09-25

## 2024-09-25 RX ORDER — POTASSIUM CHLORIDE 1500 MG/1
20 TABLET, EXTENDED RELEASE ORAL DAILY
Qty: 30 TABLET | Refills: 1 | Status: SHIPPED | OUTPATIENT
Start: 2024-09-25

## 2024-09-25 NOTE — Clinical Note
Ronald Gonsales Thanks for seeing this patient  He was reluctant to start the furosemide that he has never been on before  I convinced him to start it but he wanted a lower dose initially  Thus I sent in 20 mg daily prescription and told him to just take one not two potassium pills with it  Can increase later if needed Thanks much  Richi Jaramillo MD

## 2024-09-25 NOTE — ORAL ONC MGMT
Oral Chemotherapy Monitoring Program  Lab Follow Up    Reviewed lab results from 9/24.        8/6/2024     9:00 AM 8/14/2024     3:00 PM 8/22/2024     2:00 PM 8/23/2024    11:00 AM 8/26/2024     9:00 AM 9/5/2024    12:00 PM 9/25/2024     9:00 AM   ORAL CHEMOTHERAPY   Assessment Type Refill New Teach Left Voicemail Left Voicemail Initial Follow up Refill Lab Monitoring   Diagnosis Code Prostate Cancer Prostate Cancer Prostate Cancer Prostate Cancer Prostate Cancer Prostate Cancer Prostate Cancer   Providers Dr. Dimitrios Plunkett   [facility]   Is this patient followed by the [facility] OC team? No No No No No No No   Drug Name Nubeqa (darolutamide) Nubeqa (darolutamide) Nubeqa (darolutamide) Nubeqa (darolutamide) Nubeqa (darolutamide) Nubeqa (darolutamide) Nubeqa (darolutamide)   Dose 600 mg 600 mg 600 mg 600 mg 600 mg 600 mg 600 mg   Current Schedule BID BID BID BID BID BID BID   Cycle Details Continuous Continuous Continuous Continuous Continuous Continuous Continuous   Start Date of Last Cycle  8/14/2024 8/14/2024     Planned next cycle start date  9/4/2024        Adverse Effects     No AE identified during assessment     Any new drug interactions? Yes         Pharmacist Intervention? No         Is the dose as ordered appropriate for the patient? Yes             Labs:  _  Result Component Current Result Ref Range   Sodium 140 (9/24/2024) 135 - 145 mmol/L     _  Result Component Current Result Ref Range   Potassium 4.9 (9/24/2024) 3.4 - 5.3 mmol/L     _  Result Component Current Result Ref Range   Calcium 8.5 (L) (9/24/2024) 8.8 - 10.4 mg/dL     No results found for Mag within last 30 days.     No results found for Phos within last 30 days.     _  Result Component Current Result Ref Range   Albumin 2.9 (L) (9/24/2024) 3.5 - 5.2 g/dL     _  Result Component Current Result Ref Range   Urea  Nitrogen 22.7 (9/24/2024) 8.0 - 23.0 mg/dL     _  Result Component Current Result Ref Range   Creatinine 0.97 (9/24/2024) 0.67 - 1.17 mg/dL     _  Result Component Current Result Ref Range   AST 42 (9/24/2024) 0 - 45 U/L     _  Result Component Current Result Ref Range   ALT 30 (9/24/2024) 0 - 70 U/L     _  Result Component Current Result Ref Range   Bilirubin Total 0.9 (9/24/2024) <=1.2 mg/dL     _  Result Component Current Result Ref Range   WBC Count 5.9 (9/24/2024) 4.0 - 11.0 10e3/uL     _  Result Component Current Result Ref Range   Hemoglobin 9.5 (L) (9/24/2024) 13.3 - 17.7 g/dL     _  Result Component Current Result Ref Range   Platelet Count 73 (L) (9/24/2024) 150 - 450 10e3/uL     No results found for ANC within last 30 days.     _  Result Component Current Result Ref Range   Absolute Neutrophils 4.9 (9/24/2024) 1.6 - 8.3 10e3/uL        Assessment & Plan:  No concerning abnormalities.  Stable thrombocytopenia.    Patient seen in infusion - no communications sent.    Follow-Up:  Labs/appt 10/3    Thank you,  Daron Fuller, PharmD  Oral Chemotherapy Monitoring Program - Skin/Sarcoma/  [phone]

## 2024-09-25 NOTE — PROGRESS NOTES
Canelo is a 74 year old who is being evaluated via a billable telephone visit.    What phone number would you like to be contacted at? [phone]  How would you like to obtain your AVS? Mail a copy  Originating Location (pt. Location): Home    Distant Location (provider location):  On-site        Subjective   Canelo is a 74 year old, presenting for the following health issues:  RECHECK        9/25/2024     5:01 PM   Additional Questions   Roomed by Sofia Conde     History of Present Illness       Reason for visit:  Recheck   He is taking medications regularly.         Wondering about nursing home     Still at home now     Legs and feet swollen    Abd protruding    Got prescription for furosemide yesterday     Patient thinking of short term rehab stay  Skilled nursing    Living in [facility]    Has to pay for someone to help take them down to pickup place    Using metro mobility    Getting [employer] food delivered    Mainly using wheelchair    Using less oxycodone, no refill needed     Has some tramadol left                        Objective           Vitals:  No vitals were obtained today due to virtual visit.    Physical Exam   General: Alert and no distress //Respiratory: No audible wheeze, cough, or shortness of breath // Psychiatric:  Appropriate affect, tone, and pace of words    ASSESSMENT / PLAN:  (I50.32) Chronic diastolic heart failure (H)  (primary encounter diagnosis)  Comment: note patient is reluctant to start the furosemide.  Has never been on this before.  Got prescription for 40 mg daily from cardiology.  I feel it would be good to start but we could start with 20 mg daily and later he and cardiology could increase if needed.  Since he will start with 20 mg, then just take one 20 meq KCL instead of two.  He gets labs done regularly with chemo.   Plan: furosemide (LASIX) 20 MG tablet, potassium         chloride fantasma ER (KLOR-CON M20) 20 MEQ CR         tablet             (I42.1) HOCM (hypertrophic  obstructive cardiomyopathy) (H)  Comment: as above   Plan: furosemide (LASIX) 20 MG tablet, potassium         chloride fantasma ER (KLOR-CON M20) 20 MEQ CR         tablet             (I10) Hypertension goal BP (blood pressure) < 140/90  Comment: as above   Plan: furosemide (LASIX) 20 MG tablet, potassium         chloride fantasma ER (KLOR-CON M20) 20 MEQ CR         tablet             (R60.0) Bilateral lower extremity edema  Comment: the furosemide should help   Plan: as above    (R53.1) Weakness  Comment: unfortunately patient very weak.  He wonders about short term nursing home.  Unclear about coverage etc.  Urged him to talk with  associated with oncology.   Plan: as above     (C61,  C79.51) Prostate cancer metastatic to bone (H)  Comment: on active treatment per onc   Plan: as above     Do another phone visit in 3 weeks       I reviewed the patient's medications, allergies, medical history, family history, and social history.    Richi Jaramillo MD          Phone call duration: 30 minutes  Signed Electronically by: Richi Jaramillo MD

## 2024-09-25 NOTE — PATIENT INSTRUCTIONS
Talk with oncology social worker    Start one 20 mg furosemide daily along with one 20 millequivalent potassium pill daily     Work on strengthening as able

## 2024-09-26 ENCOUNTER — PATIENT OUTREACH (OUTPATIENT)
Dept: ONCOLOGY | Facility: CLINIC | Age: 74
End: 2024-09-26
Payer: COMMERCIAL

## 2024-09-26 ENCOUNTER — TELEPHONE (OUTPATIENT)
Dept: FAMILY MEDICINE | Facility: CLINIC | Age: 74
End: 2024-09-26
Payer: COMMERCIAL

## 2024-09-26 DIAGNOSIS — Z53.9 DIAGNOSIS NOT YET DEFINED: Primary | ICD-10-CM

## 2024-09-26 PROCEDURE — G0179 MD RECERTIFICATION HHA PT: HCPCS | Performed by: FAMILY MEDICINE

## 2024-09-26 NOTE — PROGRESS NOTES
[facility]: Cancer Care                                                                                          Called patient in attempt to schedule for PET scan.  Call went straight to  and mailbox is full.  Will attempt 1x.    Addendum:  Urology dept was able to reach patient to assist in scheduling PET scan.  No further action needed at this time from RNCC.    Piedad Rivas RN  Cancer Care Coordinator  [facility]

## 2024-09-26 NOTE — TELEPHONE ENCOUNTER
Forms/Letter Request    Type of form/letter: Home Health Certification      Do we have the form/letter: Yes:     Who is the form from? Patient    Where did/will the form come from? form was faxed in    When is form/letter needed by: 3-5 Days     How would you like the form/letter returned: Fax : [phone]    Patient Notified form requests are processed in 5-7 business days:Yes    Okay to leave a detailed message?: Yes at Home number on file [phone] (home)

## 2024-09-27 ENCOUNTER — TELEPHONE (OUTPATIENT)
Dept: CARDIOLOGY | Facility: CLINIC | Age: 74
End: 2024-09-27
Payer: COMMERCIAL

## 2024-09-27 NOTE — TELEPHONE ENCOUNTER
[facility] Call Center    Phone Message    May a detailed message be left on voicemail: yes     Reason for Call: Other: Canelo would like a call back to discuss the CORE enrollment order that he received a call to schedule today.     Action Taken: Other: Cardiology    Travel Screening: Not Applicable     Thank you!  [facility]

## 2024-09-27 NOTE — TELEPHONE ENCOUNTER
Spoke with Canelo. Patient states due with mobility issues and limitations, he is not able to arrange routine visits with Cardiology. Discuss with patient, he does not need to arrange for CORE visits, instead he can meet with Karie Cantrell NP. Patient declines scheduling visit as he wants to minimize appointments and would just like to follow up with Dr. Gonsales. Patient states he will out of town this winter, but will notify clinic before he leaves to arrange follow up visit at that time.     Patient also states he met with his PCP on 9/25 and Dr. Jaramillo decrease patient's dose for furosemide to 20 mg once daily and KCL to 20 mEq once daily. Canelo will be completing lab work on 10/3 and will have BMP drawn that time.     Inquired with patient if he would like to continue enrollment for camzyos. Patient states he would like to put that on hold at this time. We would like to wait when is more mobile and able to arrange for better transportation to complete routine echocardiogram as needed for camzyos monitoring. Canelo agrees to notify clinic when he would like to start.    Reviewed with patient, writer will follow up on lab work on 10/3 and will call to check in on him at this time. Patient agrees with plan and has no further questions at this time.    Zacarias Polk RN

## 2024-09-27 NOTE — TELEPHONE ENCOUNTER
9/27 spoke to pt and attempted to schedule a New Core appt and labs in a week  Pt  stated that he doesn't want to be seen with Core at the moment and stated that he spoke to someone and made it clear   Adv him will rely it to the nurse

## 2024-09-30 ENCOUNTER — MEDICAL CORRESPONDENCE (OUTPATIENT)
Dept: HEALTH INFORMATION MANAGEMENT | Facility: CLINIC | Age: 74
End: 2024-09-30

## 2024-10-02 ENCOUNTER — TELEPHONE (OUTPATIENT)
Dept: FAMILY MEDICINE | Facility: CLINIC | Age: 74
End: 2024-10-02
Payer: COMMERCIAL

## 2024-10-02 DIAGNOSIS — Z87.81 S/P LEFT HIP FRACTURE: ICD-10-CM

## 2024-10-02 DIAGNOSIS — C79.51 PROSTATE CANCER METASTATIC TO BONE (H): ICD-10-CM

## 2024-10-02 DIAGNOSIS — C61 PROSTATE CANCER METASTATIC TO BONE (H): ICD-10-CM

## 2024-10-02 DIAGNOSIS — R26.89 IMPAIRED GAIT AND MOBILITY: Primary | ICD-10-CM

## 2024-10-02 NOTE — CONFIDENTIAL NOTE
Patient had sent me a couple messages recently     Called to discuss    Rash is improving, drying up, feels better    Had started with diarrhea    Diarrhea gone    Currently has a two wheel walker    Would benefit from 4 wheel walker with seat  Did order    Will mail paperwork to patient    Richi Jaramillo MD

## 2024-10-03 ENCOUNTER — APPOINTMENT (OUTPATIENT)
Dept: LAB | Facility: CLINIC | Age: 74
End: 2024-10-03
Attending: INTERNAL MEDICINE
Payer: COMMERCIAL

## 2024-10-03 ENCOUNTER — ONCOLOGY VISIT (OUTPATIENT)
Dept: ONCOLOGY | Facility: CLINIC | Age: 74
End: 2024-10-03
Payer: COMMERCIAL

## 2024-10-03 ENCOUNTER — INFUSION THERAPY VISIT (OUTPATIENT)
Dept: ONCOLOGY | Facility: CLINIC | Age: 74
End: 2024-10-03
Attending: INTERNAL MEDICINE
Payer: COMMERCIAL

## 2024-10-03 ENCOUNTER — MEDICAL CORRESPONDENCE (OUTPATIENT)
Dept: HEALTH INFORMATION MANAGEMENT | Facility: CLINIC | Age: 74
End: 2024-10-03

## 2024-10-03 VITALS — WEIGHT: 253.8 LBS | BODY MASS INDEX: 36.42 KG/M2

## 2024-10-03 VITALS
DIASTOLIC BLOOD PRESSURE: 71 MMHG | RESPIRATION RATE: 16 BRPM | TEMPERATURE: 98.3 F | HEART RATE: 78 BPM | SYSTOLIC BLOOD PRESSURE: 137 MMHG | OXYGEN SATURATION: 97 %

## 2024-10-03 DIAGNOSIS — I42.1 HOCM (HYPERTROPHIC OBSTRUCTIVE CARDIOMYOPATHY) (H): ICD-10-CM

## 2024-10-03 DIAGNOSIS — Z51.11 ENCOUNTER FOR ANTINEOPLASTIC CHEMOTHERAPY: ICD-10-CM

## 2024-10-03 DIAGNOSIS — R63.5 ABNORMAL WEIGHT GAIN: Primary | ICD-10-CM

## 2024-10-03 DIAGNOSIS — C79.51 METASTASIS TO BONE (H): ICD-10-CM

## 2024-10-03 DIAGNOSIS — C77.8 MALIGNANT NEOPLASM METASTATIC TO LYMPH NODES OF MULTIPLE SITES (H): ICD-10-CM

## 2024-10-03 DIAGNOSIS — I50.32 CHRONIC DIASTOLIC HEART FAILURE (H): ICD-10-CM

## 2024-10-03 DIAGNOSIS — C77.8 MALIGNANT NEOPLASM METASTATIC TO LYMPH NODES OF MULTIPLE SITES (H): Primary | ICD-10-CM

## 2024-10-03 DIAGNOSIS — C61 PROSTATE CANCER (H): ICD-10-CM

## 2024-10-03 DIAGNOSIS — I50.9 HEART FAILURE, UNSPECIFIED HF CHRONICITY, UNSPECIFIED HEART FAILURE TYPE (H): ICD-10-CM

## 2024-10-03 DIAGNOSIS — R63.5 ABNORMAL WEIGHT GAIN: ICD-10-CM

## 2024-10-03 DIAGNOSIS — C61 PROSTATE CANCER (H): Primary | ICD-10-CM

## 2024-10-03 DIAGNOSIS — I10 HYPERTENSION GOAL BP (BLOOD PRESSURE) < 140/90: ICD-10-CM

## 2024-10-03 LAB
ALBUMIN SERPL BCG-MCNC: 3 G/DL (ref 3.5–5.2)
ALP SERPL-CCNC: 116 U/L (ref 40–150)
ALT SERPL W P-5'-P-CCNC: 30 U/L (ref 0–70)
ANION GAP SERPL CALCULATED.3IONS-SCNC: 7 MMOL/L (ref 7–15)
AST SERPL W P-5'-P-CCNC: 38 U/L (ref 0–45)
BASOPHILS # BLD AUTO: 0 10E3/UL (ref 0–0.2)
BASOPHILS NFR BLD AUTO: 0 %
BILIRUB SERPL-MCNC: 1.2 MG/DL
BUN SERPL-MCNC: 25.4 MG/DL (ref 8–23)
CALCIUM SERPL-MCNC: 8.6 MG/DL (ref 8.8–10.4)
CHLORIDE SERPL-SCNC: 108 MMOL/L (ref 98–107)
CREAT SERPL-MCNC: 1.01 MG/DL (ref 0.67–1.17)
EGFRCR SERPLBLD CKD-EPI 2021: 79 ML/MIN/1.73M2
EOSINOPHIL # BLD AUTO: 0.1 10E3/UL (ref 0–0.7)
EOSINOPHIL NFR BLD AUTO: 2 %
ERYTHROCYTE [DISTWIDTH] IN BLOOD BY AUTOMATED COUNT: 21.8 % (ref 10–15)
GLUCOSE SERPL-MCNC: 117 MG/DL (ref 70–99)
HCO3 SERPL-SCNC: 25 MMOL/L (ref 22–29)
HCT VFR BLD AUTO: 28.6 % (ref 40–53)
HGB BLD-MCNC: 9.2 G/DL (ref 13.3–17.7)
IMM GRANULOCYTES # BLD: 0 10E3/UL
IMM GRANULOCYTES NFR BLD: 0 %
LYMPHOCYTES # BLD AUTO: 0.6 10E3/UL (ref 0.8–5.3)
LYMPHOCYTES NFR BLD AUTO: 13 %
MCH RBC QN AUTO: 31.3 PG (ref 26.5–33)
MCHC RBC AUTO-ENTMCNC: 32.2 G/DL (ref 31.5–36.5)
MCV RBC AUTO: 97 FL (ref 78–100)
MONOCYTES # BLD AUTO: 0.4 10E3/UL (ref 0–1.3)
MONOCYTES NFR BLD AUTO: 8 %
NEUTROPHILS # BLD AUTO: 3.5 10E3/UL (ref 1.6–8.3)
NEUTROPHILS NFR BLD AUTO: 76 %
NRBC # BLD AUTO: 0 10E3/UL
NRBC BLD AUTO-RTO: 0 /100
NT-PROBNP SERPL-MCNC: 4738 PG/ML (ref 0–900)
PLATELET # BLD AUTO: 74 10E3/UL (ref 150–450)
POTASSIUM SERPL-SCNC: 4.1 MMOL/L (ref 3.4–5.3)
PROT SERPL-MCNC: 5.4 G/DL (ref 6.4–8.3)
PSA SERPL DL<=0.01 NG/ML-MCNC: 4.13 NG/ML (ref 0–6.5)
RBC # BLD AUTO: 2.94 10E6/UL (ref 4.4–5.9)
SODIUM SERPL-SCNC: 140 MMOL/L (ref 135–145)
WBC # BLD AUTO: 4.6 10E3/UL (ref 4–11)

## 2024-10-03 PROCEDURE — G0463 HOSPITAL OUTPT CLINIC VISIT: HCPCS | Mod: 25

## 2024-10-03 PROCEDURE — 96377 APPLICATON ON-BODY INJECTOR: CPT | Mod: 59

## 2024-10-03 PROCEDURE — 83880 ASSAY OF NATRIURETIC PEPTIDE: CPT

## 2024-10-03 PROCEDURE — 80053 COMPREHEN METABOLIC PANEL: CPT | Performed by: INTERNAL MEDICINE

## 2024-10-03 PROCEDURE — 258N000003 HC RX IP 258 OP 636

## 2024-10-03 PROCEDURE — 96413 CHEMO IV INFUSION 1 HR: CPT

## 2024-10-03 PROCEDURE — 84153 ASSAY OF PSA TOTAL: CPT

## 2024-10-03 PROCEDURE — 85025 COMPLETE CBC W/AUTO DIFF WBC: CPT | Performed by: INTERNAL MEDICINE

## 2024-10-03 PROCEDURE — 250N000011 HC RX IP 250 OP 636

## 2024-10-03 PROCEDURE — 36591 DRAW BLOOD OFF VENOUS DEVICE: CPT | Performed by: INTERNAL MEDICINE

## 2024-10-03 PROCEDURE — 96367 TX/PROPH/DG ADDL SEQ IV INF: CPT

## 2024-10-03 PROCEDURE — 250N000011 HC RX IP 250 OP 636: Mod: JZ

## 2024-10-03 PROCEDURE — 96372 THER/PROPH/DIAG INJ SC/IM: CPT

## 2024-10-03 PROCEDURE — 99214 OFFICE O/P EST MOD 30 MIN: CPT

## 2024-10-03 RX ORDER — METHYLPREDNISOLONE SODIUM SUCCINATE 125 MG/2ML
125 INJECTION INTRAMUSCULAR; INTRAVENOUS
Status: CANCELLED
Start: 2024-10-03

## 2024-10-03 RX ORDER — HEPARIN SODIUM (PORCINE) LOCK FLUSH IV SOLN 100 UNIT/ML 100 UNIT/ML
5 SOLUTION INTRAVENOUS
Status: CANCELLED | OUTPATIENT
Start: 2024-10-03

## 2024-10-03 RX ORDER — HEPARIN SODIUM,PORCINE 10 UNIT/ML
5-20 VIAL (ML) INTRAVENOUS DAILY PRN
Status: CANCELLED | OUTPATIENT
Start: 2024-10-03

## 2024-10-03 RX ORDER — DIPHENHYDRAMINE HYDROCHLORIDE 50 MG/ML
50 INJECTION INTRAMUSCULAR; INTRAVENOUS
Status: CANCELLED
Start: 2024-10-03

## 2024-10-03 RX ORDER — ALBUTEROL SULFATE 0.83 MG/ML
2.5 SOLUTION RESPIRATORY (INHALATION)
Status: CANCELLED | OUTPATIENT
Start: 2024-10-03

## 2024-10-03 RX ORDER — HEPARIN SODIUM (PORCINE) LOCK FLUSH IV SOLN 100 UNIT/ML 100 UNIT/ML
5 SOLUTION INTRAVENOUS
Status: DISCONTINUED | OUTPATIENT
Start: 2024-10-03 | End: 2024-10-03 | Stop reason: HOSPADM

## 2024-10-03 RX ORDER — LORAZEPAM 2 MG/ML
0.5 INJECTION INTRAMUSCULAR EVERY 4 HOURS PRN
Status: CANCELLED | OUTPATIENT
Start: 2024-10-03

## 2024-10-03 RX ORDER — DEXAMETHASONE 4 MG/1
8 TABLET ORAL 2 TIMES DAILY WITH MEALS
Status: ON HOLD | COMMUNITY
Start: 2024-10-03 | End: 2024-10-29

## 2024-10-03 RX ORDER — MEPERIDINE HYDROCHLORIDE 25 MG/ML
25 INJECTION INTRAMUSCULAR; INTRAVENOUS; SUBCUTANEOUS EVERY 30 MIN PRN
Status: CANCELLED | OUTPATIENT
Start: 2024-10-03

## 2024-10-03 RX ORDER — HEPARIN SODIUM (PORCINE) LOCK FLUSH IV SOLN 100 UNIT/ML 100 UNIT/ML
500 SOLUTION INTRAVENOUS ONCE
Status: COMPLETED | OUTPATIENT
Start: 2024-10-03 | End: 2024-10-03

## 2024-10-03 RX ORDER — EPINEPHRINE 1 MG/ML
0.3 INJECTION, SOLUTION INTRAMUSCULAR; SUBCUTANEOUS EVERY 5 MIN PRN
Status: CANCELLED | OUTPATIENT
Start: 2024-10-03

## 2024-10-03 RX ORDER — ALBUTEROL SULFATE 90 UG/1
1-2 INHALANT RESPIRATORY (INHALATION)
Status: CANCELLED
Start: 2024-10-03

## 2024-10-03 RX ADMIN — DOCETAXEL 138 MG: 20 INJECTION, SOLUTION, CONCENTRATE INTRAVENOUS at 10:05

## 2024-10-03 RX ADMIN — DEXAMETHASONE SODIUM PHOSPHATE: 10 INJECTION, SOLUTION INTRAMUSCULAR; INTRAVENOUS at 08:44

## 2024-10-03 RX ADMIN — Medication 500 UNITS: at 06:55

## 2024-10-03 RX ADMIN — SODIUM CHLORIDE 250 ML: 9 INJECTION, SOLUTION INTRAVENOUS at 08:43

## 2024-10-03 RX ADMIN — PEGFILGRASTIM 6 MG: KIT SUBCUTANEOUS at 10:36

## 2024-10-03 RX ADMIN — Medication 5 ML: at 11:10

## 2024-10-03 ASSESSMENT — PAIN SCALES - GENERAL: PAINLEVEL: SEVERE PAIN (7)

## 2024-10-03 NOTE — PROGRESS NOTES
Infusion Nursing Note:  Gucci Logan presents today for cycle 3 day 1 docetaxel.    Patient seen by provider today: Yes: Piedad Ji PA-C   present during visit today: Not Applicable.    Note: Patient reports to infusion clinic today following his provider visit. Weight not preformed in lab, so standing weight obtained and showed a 20 lb weight gain since 9/24/24. Piedad Ji made aware. Otherwise the patient feels well and has no concerns, wishes to proceed with treatment today.    FLOR Ji PA-C/Nia Cruz RN 10/3/24 at 0900  -Add on BNP  -increase home lasix to 40 mg a day  -follow up with PCP in 1-2 days  -Wait for BNP results prior to proceeding with treatment  -Ok to proceed with treatment  -Patient should continue taking dex    Provider also notified that his dexamethasone had been d/c'd by ER scribe, added back on by [facility]. Patient to continue taking dex after his chemo as he had been on previous cycles.     Intravenous Access:  Implanted Port.    Treatment Conditions:  Lab Results   Component Value Date    HGB 9.2 (L) 10/03/2024    WBC 4.6 10/03/2024    ANEU 2.1 08/21/2024    ANEUTAUTO 3.5 10/03/2024    PLT 74 (L) 10/03/2024        Results reviewed, labs MET treatment parameters, ok to proceed with treatment.      Post Infusion Assessment:  Patient tolerated infusion without incident.  Blood return noted pre and post infusion.  Site patent and intact, free from redness, edema or discomfort.  No evidence of extravasations.  Access discontinued per protocol.     Neulasta Onpro On-Body injector applied to left lower abdomen at 1040.  Writer discussed Neulasta injection would start tomorrow 10/4/24 at 1340, approximately 27 hours after application applied today.    Written and Verbal instruction reviewed with patient.  Pt instructed when the dose delivery starts, it will take about 45 minutes to complete.  Pt aware Neulasta Onpro On-Body should have green flashing light and to  call triage or on-call MD if injector flashes red or appears to be leaking.   Pt aware to keep Onpro On-Body Neulasta 4 inches away from electrical equipment and to avoid showering 4 hours prior to injection.   Neulasta Onpro Lot number: see MAR     Discharge Plan:   Patient declined prescription refills.  Discharge instructions reviewed with: Patient.  Patient and/or family verbalized understanding of discharge instructions and all questions answered.  Copy of AVS reviewed with patient and/or family.  Patient will return 10/24 for next appointment.  Patient discharged in stable condition accompanied by: self.  Departure Mode: Wheelchair.      Nia Cruz RN

## 2024-10-03 NOTE — NURSING NOTE
"Oncology Rooming Note    October 3, 2024 7:07 AM   Gucci Logan is a 74 year old male who presents for:    Chief Complaint   Patient presents with    Port Draw     Labs drawn from port by RN in lab     Initial Vitals: /71 (BP Location: Right arm, Patient Position: Sitting, Cuff Size: Adult Regular)   Pulse 78   Temp 98.3  F (36.8  C) (Oral)   Resp 16   SpO2 97%  Estimated body mass index is 33.15 kg/m  as calculated from the following:    Height as of 9/23/24: 1.778 m (5' 10\").    Weight as of 9/24/24: 104.8 kg (231 lb). There is no height or weight on file to calculate BSA.  Severe Pain (7) Comment: Data Unavailable   No LMP for male patient.  Allergies reviewed: Yes  Medications reviewed: Yes    Medications: Medication refills not needed today.  Pharmacy name entered into EPIC:    [employer] [id number] [place], [place] - [address] [facility]  [facility] - [place], [place] - [address] [place] [address] PHARM[address] [place] [address] [facility]  [facility] - [place], [place] - [address]     Frailty Screening:   Is the patient here for a new oncology consult visit in cancer care? 2. No      Clinical concerns: none      Anton Chase LPN              "

## 2024-10-03 NOTE — Clinical Note
10/3/2024      Gucci Logan  [address] [facility] [address]      Dear Colleague,    Thank you for referring your patient, Gucci Logan, to the [facility]. Please see a copy of my visit note below.    RETURN ONCOLOGY VISIT: prior cycle 3 docetaxel     Oct 3, 2024    PRIMARY CARE PHYSICIAN: Richi Jaramillo MD  ORTHOPEDIC SURGERY: Rafa Wagner MD   RADIATION ONCOLOGY: Subha Gan MD     HISTORY OF PRESENT ILLNESS: Patient is a 74-year-old male with stage IVB adenocarcinoma prostate (cT2c, cN0, cM1b, PSA 8.81 ng/mL).  Patient had a mechanical fall 05/19/2024, while working in his yard. CT head 05/19/2024, was negative.  CT cervical spine 05/19/2024, was negative for subluxation or fracture.  There was high-grade C5-C6 and C6-C7 central and bilateral foraminal stenosis.  X-ray pelvis and hips 05/19/2024, revealed a left femur fracture at the intertrochanteric/subtrochanteric junction. Left iliac crest bone marrow aspirate and left femur curettage samples at the time of the left hip fracture ORIF 05/24/2024, revealed trilineage hematopoiesis without evidence of dysplasia or increase in blasts.  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  The left femur curettage sample showed metastatic prostate adenocarcinoma that was positive for CK AE1/AE3 and NKX3.1, but negative for CK7, CK20, PAX8, TTF-1, GATA3, SATB2, CDX2, and arginase.  Previous PSA was 1.49 (08/29/2023).  68-Ga PSMA-11 PET/CT scan 07/03/2024, revealed heterogeneous PSMA uptake in the prostate without discrete focality.  There was enlarged and PSMA avid lymphadenopathy including a 2 x 1.6 cm subcarinal lymph node with SUV max 3.8 (SUV mean 2.3), bilateral common iliac and left  external iliac lymph nodes including a 1.6 x 1.6 cm left common iliac lymph node with SUV max 5.3, and a 2.1 x 1.2 cm left external iliac lymph node with SUV max 5.2.  There were multiple PSMA-avid lytic/sclerotic metastases in the axial and appendicular skeleton including bilateral scapulae (right scapula with SUV max 7.1), left humerus, bilateral ribs, spine, pelvis (left posterior iliac with SUV max 9.3), left proximal femur with pathologic fracture and hardware in place..  There is liver was cirrhotic with features of portal hypertension including splenomegaly and dilated upper abdominal portosystemic collaterals. Patient received radiation therapy to the left femur (25 Gy in 5 fractions) from 07/29/2024 through 08/02/2024.  Patient is is receiving first-line androgen deprivation therapy (ADT) consisting of degarelix 240 mg loading dose on 07/29/2024, then leuprolide 22.5 mg every 3 months beginning 08/26/2024, and docetaxel 60 mg/m  IV every 21 days x6 cycles and darolutamide 600 mg BID beginning 08/14/2024.  There was a docetaxel 60 mg/m  dose reduction due to underlying cirrhosis prior history of hepatitis C.  Patient received 2 cycles of darolutamide/docetaxel thus far (09/05/2024).  Patient developed painful blisters in the posterior left thigh extending from below the buttock to the popliteal area.  The blisters have become unroofed and are weeping.  Patient has local wound care through home health.  Patient has fatigue, weakness, abnormal taste sensation, cough productive of yellowish sputum, occasional nausea and a few episodes of diarrhea after the docetaxel infusion.  Patient did not require antiemetics.  Patient has started physical therapy and occupational therapy at home but has restrictions regarding weightbearing until there is adequate healing of the left femur fracture and surgery.      Cycle 3 docetaxel deferred on 09/24/2024 due to left thigh and buttock wound. Returns today to assess  appropriateness to proceed with cycle 3 docetaxel. He continues on darolutamide.     INTERVAL HISTORY:  Using clindamycin 1% and completed valtrex.       There are no other new symptoms or events since the prior clinic visit (09/05/2024). Patient denies fever, anorexia, headache, dyspnea, hemoptysis, abdominal pain, vomiting, constipation, or diarrhea.     PAST HISTORY: Past history was reviewed and unchanged from the clinic note 07/18/2024.     MEDICATIONS:   Current Outpatient Medications   Medication Sig Dispense Refill    acetaminophen (TYLENOL) 325 MG tablet Take 2 tablets (650 mg) by mouth every 4 hours as needed for other (For optimal non-opioid multimodal pain management to improve pain control.) 100 tablet 0    aspirin 81 MG EC tablet Take 1 tablet (81 mg) by mouth daily 90 tablet 3    calcium citrate (CITRACAL) 950 (200 Ca) MG tablet Take 1 tablet (950 mg) by mouth daily 120 tablet 3    darolutamide (NUBEQA) 300 MG tablet Take 2 tablets (600 mg) by mouth 2 times daily for 21 days. . Swallow tablets whole. Take with food. Avoid grapefruit or grapefruit juice. 84 tablet 0    diclofenac (VOLTAREN) 1 % topical gel Apply 4 g topically 3 times daily as needed for moderate pain (bursitis) 150 g 1    furosemide (LASIX) 20 MG tablet Take 1 tablet (20 mg) by mouth daily. 30 tablet 1    metFORMIN (GLUCOPHAGE) 500 MG tablet Take 1 tablet (500 mg) by mouth 2 times daily (with meals). 180 tablet 1    methocarbamol (ROBAXIN) 500 MG tablet Take 1 Tablet (500 mg) by mouth every 6 hours if needed for Muscle Spasm PO 1st choice.      metoprolol succinate ER (TOPROL XL) 100 MG 24 hr tablet 1 1/2 ( 150 mg ) po daily 135 tablet 1    multivitamin w/minerals (THERA-VIT-M) tablet Take 1 tablet by mouth daily      nitroGLYcerin (NITROSTAT) 0.4 MG sublingual tablet For chest pain place 1 tablet under the tongue every 5 minutes for 3 doses. If symptoms persist 5 minutes after 1st dose call 911. 30 tablet 0    omeprazole (PRILOSEC) 20  MG DR capsule TAKE 1 CAPSULE BY MOUTH ONCE DAILY 30 TO 60 MINUTES BEFORE A MEAL 90 capsule 1    ondansetron (ZOFRAN) 8 MG tablet Take 1 tablet (8 mg) by mouth every 8 hours as needed for nausea 60 tablet 5    oxyCODONE (ROXICODONE) 5 MG tablet Take 1-2 tablets (5-10 mg) by mouth every 6 hours as needed for pain. 70 tablet 0    potassium chloride fantasma ER (KLOR-CON M20) 20 MEQ CR tablet Take 1 tablet (20 mEq) by mouth daily. 30 tablet 1    predniSONE (DELTASONE) 5 MG tablet Take 1 tablet (5 mg) by mouth daily (with breakfast) Do not take prednisone on days when taking dexamethasone. 60 tablet 1    prochlorperazine (COMPAZINE) 10 MG tablet Take 0.5 tablets (5 mg) by mouth every 6 hours as needed for nausea or vomiting 60 tablet 5    senna-docusate (SENOKOT-S/PERICOLACE) 8.6-50 MG tablet Take 1 tablet by mouth 2 times daily as needed for constipation 30 tablet 1    tiZANidine (ZANAFLEX) 4 MG tablet TAKE 1/2 (ONE-HALF) TO 1  TABLET BY MOUTH UP TO THREE TIMES DAILY AS NEEDED FOR MUSCLE PAIN 40 tablet 0    valACYclovir (VALTREX) 1000 mg tablet Take 1 tablet (1,000 mg) by mouth 3 times daily for 7 days. 21 tablet 0    valsartan (DIOVAN) 160 MG tablet Take 1 tablet (160 mg) by mouth daily 90 tablet 1    vitamin D3 (CHOLECALCIFEROL) 50 mcg (2000 units) tablet Take 1 tablet (50 mcg) by mouth daily 120 tablet 3       REVIEW OF SYSTEMS: Review of systems reviewed with the patient and otherwise negative except for those detailed above.    PHYSICAL EXAM: Not done. Telehealth visit. /71 (BP Location: Right arm, Patient Position: Sitting, Cuff Size: Adult Regular)   Pulse 78   Temp 98.3  F (36.8  C) (Oral)   Resp 16   SpO2 97% .  There is no height or weight on file to calculate BSA.     LABORATORY:   Component      Latest Ref Rng 6/28/2024  7:39 AM 8/14/2024  12:19 PM 9/5/2024  10:21 AM   Testosterone Total      240 - 950 ng/dL 263  16 (L)  14 (L)    PSA Tumor Marker      0.00 - 6.50 ng/mL 8.81 (H)  16.86 (H)  6.33         IMPRESSION/PLAN: 1.  Stage IVB adenocarcinoma prostate.  Patient sustained a intertrochanteric left femur fracture after a mechanical fall at home.  Left femur curettage revealed metastatic adenocarcinoma, prostate primary.  There are large retroperitoneal and pelvic lymph node metastases diffuse skeletal metastases noted on PSMA PET/CT scan (07/03/2024).  Patient has high-volume metastatic disease and he is receiving first-line ADT with leuprolide every 3 months (beginning 07/29/2024) and docetaxel 60 mg/m  IV every 21 days x 6 cycles and darolutamide 600 mg BID (beginning 08/14/2024) in accordance with the [employer], [place] clinical trial (N Engl J Med 2022;386:4014-7117).  There is a good biochemical response thus far.  There is grade 1 fatigue, weakness, dysgeusia, cough, nausea, diarrhea, anemia, and thrombocytopenia.  Darolutamide/docetaxel will be continued without modification.  Patient return to ambulatory infusion center 09/26/2024, for cycle 3 of docetaxel/darolutamide. I reviewed the risk and side effects of docetaxel with the patient, which include fatigue, anorexia, weight loss, alopecia, mouth sores, nausea, vomiting, diarrhea, myalgias, edema, neuropathy, cytopenia, infection, bleeding, and allergic or anaphylactic reaction.  Darolutamide is associated with fatigue, rash, myalgias, forgetfulness, and leuprolide is associated with fatigue, hot flashes, weakness, loss of muscle mass, weight gain, forgetfulness, depression, breast enlargement, joint stiffness and pain, coronary artery disease, osteoporosis, and bone fracture.  Patient understood the indication and risks and agreed to continue therapy.  Pegfilgrastim will be administered while in the after docetaxel to speed neutrophil recovery.  Patient should continue physical therapy and occupational therapy to improve balance, strength, mobility, and home safety.  Patient will return to clinic in 3 weeks with CBC, metabolic panel, PSA, restaging 68-Ga  PSMA-11 PET/CT scan to evaluate for disease progression.  The current and past history obtained from the patient and medical records, clinical evaluation, reviewing diagnostic tests and viewing images with the patient, and assessment and planning occurred over 30 minutes.   2.  Herpes zoster.  Patient h diarrhea, ad a vesicular eruption with subsequent moist desquamation in the left posterior S2 distribution consistent with herpes zoster.  Patient will receive valacyclovir 1 g TID x7 days.  The valacyclovir prescription was sent to the pharmacy today.      Souleymane Plunkett MD    cc: MD Rafa Duvall MD Chinsoo Lawrence Cho, MD      Again, thank you for allowing me to participate in the care of your patient.        Sincerely,        Piedad Ji PA-C

## 2024-10-03 NOTE — PATIENT INSTRUCTIONS
[facility] Triage and after hours / weekends / holidays:  [phone] option 5, option 2    Please call the triage or after hours line if you experience a temperature greater than or equal to 100.4, shaking chills, have uncontrolled nausea, vomiting and/or diarrhea, dizziness, shortness of breath, chest pain, bleeding, unexplained bruising, or if you have any other new/concerning symptoms, questions or concerns.      If you are having any concerning symptoms or wish to speak to a provider before your next infusion visit, please call triage to notify your care team so we can adequately serve you.     If you need a refill on a narcotic prescription or other medication, please call before your infusion appointment.

## 2024-10-03 NOTE — NURSING NOTE
Chief Complaint   Patient presents with    Port Draw     Labs drawn from port by RN in lab     Port accessed with 20 gauge, 3/4 inch, flat needle by RN, labs collected, line flushed with saline and heparin.  Vitals taken. Pt checked in for appointment(s).     Nerissa Smith RN

## 2024-10-03 NOTE — PROGRESS NOTES
RETURN ONCOLOGY VISIT: prior cycle 3 docetaxel     Oct 3, 2024    PRIMARY CARE PHYSICIAN: Richi Jaramillo MD  ORTHOPEDIC SURGERY: Rafa Wagner MD   RADIATION ONCOLOGY: Subha Gan MD     HISTORY OF PRESENT ILLNESS: Patient is a 74-year-old male with stage IVB adenocarcinoma prostate (cT2c, cN0, cM1b, PSA 8.81 ng/mL).  Patient had a mechanical fall 05/19/2024, while working in his yard. CT head 05/19/2024, was negative.  CT cervical spine 05/19/2024, was negative for subluxation or fracture.  There was high-grade C5-C6 and C6-C7 central and bilateral foraminal stenosis.  X-ray pelvis and hips 05/19/2024, revealed a left femur fracture at the intertrochanteric/subtrochanteric junction. Left iliac crest bone marrow aspirate and left femur curettage samples at the time of the left hip fracture ORIF 05/24/2024, revealed trilineage hematopoiesis without evidence of dysplasia or increase in blasts.  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  The left femur curettage sample showed metastatic prostate adenocarcinoma that was positive for CK AE1/AE3 and NKX3.1, but negative for CK7, CK20, PAX8, TTF-1, GATA3, SATB2, CDX2, and arginase.  Previous PSA was 1.49 (08/29/2023).  68-Ga PSMA-11 PET/CT scan 07/03/2024, revealed heterogeneous PSMA uptake in the prostate without discrete focality.  There was enlarged and PSMA avid lymphadenopathy including a 2 x 1.6 cm subcarinal lymph node with SUV max 3.8 (SUV mean 2.3), bilateral common iliac and left external iliac lymph nodes including a 1.6 x 1.6 cm left common iliac lymph node with SUV max 5.3, and a 2.1 x 1.2 cm left external iliac lymph node with SUV max 5.2.  There were multiple PSMA-avid lytic/sclerotic metastases in the axial and  appendicular skeleton including bilateral scapulae (right scapula with SUV max 7.1), left humerus, bilateral ribs, spine, pelvis (left posterior iliac with SUV max 9.3), left proximal femur with pathologic fracture and hardware in place..  There is liver was cirrhotic with features of portal hypertension including splenomegaly and dilated upper abdominal portosystemic collaterals. Patient received radiation therapy to the left femur (25 Gy in 5 fractions) from 07/29/2024 through 08/02/2024.  Patient is is receiving first-line androgen deprivation therapy (ADT) consisting of degarelix 240 mg loading dose on 07/29/2024, then leuprolide 22.5 mg every 3 months beginning 08/26/2024, and docetaxel 60 mg/m  IV every 21 days x6 cycles and darolutamide 600 mg BID beginning 08/14/2024.  There was a docetaxel 60 mg/m  dose reduction due to underlying cirrhosis prior history of hepatitis C.  Patient received 2 cycles of darolutamide/docetaxel thus far (09/05/2024).  Patient developed painful blisters in the posterior left thigh extending from below the buttock to the popliteal area.  The blisters have become unroofed and are weeping.  Patient has local wound care through home health.  Patient has fatigue, weakness, abnormal taste sensation, cough productive of yellowish sputum, occasional nausea and a few episodes of diarrhea after the docetaxel infusion.  Patient did not require antiemetics.  Patient has started physical therapy and occupational therapy at home but has restrictions regarding weightbearing until there is adequate healing of the left femur fracture and surgery.      Cycle 3 docetaxel deferred on 09/24/2024 due to left thigh and buttock wound. Returns today to assess appropriateness to proceed with cycle 3 docetaxel. He continues on darolutamide.     INTERVAL HISTORY:  Canelo reports he is doing well today.  He reports that following his docetaxel infusion he has fatigue, mild nausea, and occasional diarrhea.  His  diarrhea has now resolved. He has antiemetics but doesn't use them. He has an ongoing occasional cough with yellowish-green sputum production.  This is unchanged from prior.  He reports that the rash on his posterior thigh seems to be improving significantly.  He does notice some orellana appearance closer to the buttocks.  He has been using topical clindamycin lotion and discussed this with his PCP.  He has completed a course of Valtrex.  He was having a home health nurse assist with applying a bandage to the wounds.  He is no longer receiving this as he felt it was not helpful and when they would remove the bandage he would rate open healing blisters.  He is working hard with home PT and is making good strides.  He does have difficulty going from sitting to standing but once he is up has been able to ambulate with a walker for short distance.  His breathing is stable he does have occasional dyspnea with exertion and states that this is not new or changed from prior.  He has started a diuretic under the discretion of his PCP for truncal fluid retention/bloating along with peripheral edema.     He is tolerating darolutamide well. He'd like to go somewhere warmer and without snow for the winter but isn't sure where he will go.     PAST HISTORY: Past history was reviewed and unchanged from the clinic note 07/18/2024.     MEDICATIONS:   Current Outpatient Medications   Medication Sig Dispense Refill    acetaminophen (TYLENOL) 325 MG tablet Take 2 tablets (650 mg) by mouth every 4 hours as needed for other (For optimal non-opioid multimodal pain management to improve pain control.) 100 tablet 0    aspirin 81 MG EC tablet Take 1 tablet (81 mg) by mouth daily 90 tablet 3    calcium citrate (CITRACAL) 950 (200 Ca) MG tablet Take 1 tablet (950 mg) by mouth daily 120 tablet 3    darolutamide (NUBEQA) 300 MG tablet Take 2 tablets (600 mg) by mouth 2 times daily for 21 days. . Swallow tablets whole. Take with food. Avoid  grapefruit or grapefruit juice. 84 tablet 0    diclofenac (VOLTAREN) 1 % topical gel Apply 4 g topically 3 times daily as needed for moderate pain (bursitis) 150 g 1    furosemide (LASIX) 20 MG tablet Take 1 tablet (20 mg) by mouth daily. 30 tablet 1    metFORMIN (GLUCOPHAGE) 500 MG tablet Take 1 tablet (500 mg) by mouth 2 times daily (with meals). 180 tablet 1    methocarbamol (ROBAXIN) 500 MG tablet Take 1 Tablet (500 mg) by mouth every 6 hours if needed for Muscle Spasm PO 1st choice.      metoprolol succinate ER (TOPROL XL) 100 MG 24 hr tablet 1 1/2 ( 150 mg ) po daily 135 tablet 1    multivitamin w/minerals (THERA-VIT-M) tablet Take 1 tablet by mouth daily      nitroGLYcerin (NITROSTAT) 0.4 MG sublingual tablet For chest pain place 1 tablet under the tongue every 5 minutes for 3 doses. If symptoms persist 5 minutes after 1st dose call 911. 30 tablet 0    omeprazole (PRILOSEC) 20 MG DR capsule TAKE 1 CAPSULE BY MOUTH ONCE DAILY 30 TO 60 MINUTES BEFORE A MEAL 90 capsule 1    ondansetron (ZOFRAN) 8 MG tablet Take 1 tablet (8 mg) by mouth every 8 hours as needed for nausea 60 tablet 5    oxyCODONE (ROXICODONE) 5 MG tablet Take 1-2 tablets (5-10 mg) by mouth every 6 hours as needed for pain. 70 tablet 0    potassium chloride fantasma ER (KLOR-CON M20) 20 MEQ CR tablet Take 1 tablet (20 mEq) by mouth daily. 30 tablet 1    predniSONE (DELTASONE) 5 MG tablet Take 1 tablet (5 mg) by mouth daily (with breakfast) Do not take prednisone on days when taking dexamethasone. 60 tablet 1    prochlorperazine (COMPAZINE) 10 MG tablet Take 0.5 tablets (5 mg) by mouth every 6 hours as needed for nausea or vomiting 60 tablet 5    senna-docusate (SENOKOT-S/PERICOLACE) 8.6-50 MG tablet Take 1 tablet by mouth 2 times daily as needed for constipation 30 tablet 1    tiZANidine (ZANAFLEX) 4 MG tablet TAKE 1/2 (ONE-HALF) TO 1  TABLET BY MOUTH UP TO THREE TIMES DAILY AS NEEDED FOR MUSCLE PAIN 40 tablet 0    valACYclovir (VALTREX) 1000 mg tablet  Take 1 tablet (1,000 mg) by mouth 3 times daily for 7 days. 21 tablet 0    valsartan (DIOVAN) 160 MG tablet Take 1 tablet (160 mg) by mouth daily 90 tablet 1    vitamin D3 (CHOLECALCIFEROL) 50 mcg (2000 units) tablet Take 1 tablet (50 mcg) by mouth daily 120 tablet 3       REVIEW OF SYSTEMS: Review of systems reviewed with the patient and otherwise negative except for those detailed above.    PHYSICAL EXAM:   /71 (BP Location: Right arm, Patient Position: Sitting, Cuff Size: Adult Regular)   Pulse 78   Temp 98.3  F (36.8  C) (Oral)   Resp 16   SpO2 97%   General: No acute distress  HEENT: Sclera anicteric. Oral mucosa pink and moist.  No mucositis or thrush  Lymph: No lymphadenopathy in neck  Heart: Regular, rate, and rhythm  Lungs: Clear to ascultation bilaterally  Abdomen: Positive bowel sounds. Soft, distended, non-tender.   Extremities: 2+ lower extremity edema  Neuro: Cranial nerves grossly intact  Rash: see photo.         LABORATORY:   Most Recent 3 CBC's:  Recent Labs   Lab Test 10/03/24  0649 09/24/24  1044 09/05/24  1021   WBC 4.6 5.9 4.5   HGB 9.2* 9.5* 10.0*   MCV 97 94 93   PLT 74* 73* 95*   ANEUTAUTO 3.5 4.9 3.5     Most Recent 3 BMP's:  Recent Labs   Lab Test 10/03/24  0649 09/24/24  1044 09/05/24  1021    140 140   POTASSIUM 4.1 4.9 4.3   CHLORIDE 108* 110* 107   CO2 25 22 25   BUN 25.4* 22.7 15.5   CR 1.01 0.97 0.91   ANIONGAP 7 8 8   SOLEDAD 8.6* 8.5* 8.8   * 117* 101*   PROTTOTAL 5.4* 5.4* 5.8*   ALBUMIN 3.0* 2.9* 3.1*    Most Recent 3 LFT's:  Recent Labs   Lab Test 10/03/24  0649 09/24/24  1044 09/05/24  1021   AST 38 42 36   ALT 30 30 28   ALKPHOS 116 137 170*   BILITOTAL 1.2 0.9 0.9    Most Recent 2 TSH and T4:  Recent Labs   Lab Test 07/03/24  1511 06/26/24  0709 04/16/24  1349   TSH 3.22 6.07* 5.38*   T4 1.35  --  1.13     Component      Latest Ref Rng 6/28/2024  7:39 AM 8/14/2024  12:19 PM 9/5/2024  10:21 AM 9/24/2024  10:44 AM 10/3/2024  6:49 AM   PSA Tumor Marker       0.00 - 6.50 ng/mL 8.81 (H)  16.86 (H)  6.33  4.71  4.13         I reviewed the above labs today.      IMPRESSION/PLAN:   1.  Stage IVB adenocarcinoma prostate.    - Patient sustained a intertrochanteric left femur fracture after a mechanical fall at home.  Left femur curettage revealed metastatic adenocarcinoma, prostate primary.  There are large retroperitoneal and pelvic lymph node metastases diffuse skeletal metastases noted on PSMA PET/CT scan (07/03/2024).  Patient has high-volume metastatic disease and he is receiving first-line ADT with leuprolide every 3 months (beginning 07/29/2024) and docetaxel 60 mg/m  IV every 21 days x 6 cycles and darolutamide 600 mg BID (beginning 08/14/2024).  - Tolerating treatment with stable fatigue/weakness, mild nausea, mild diarrhea, anemia, and thrombocytopenia. Darolutamide/docetaxel will be continued without modification. Cycle 3 docetaxel deferred on 09/26/24, deferred due to posterior left thigh rash. He has completed a course of valtrex and started topical clindamycin per PCP with improvement. See photo above. Given improvement, will proceed with cycle 3 docetaxel today 10/3/24 with neulasta support as with previous. He should continue with his take home dex as with previous.    - He continues home PT/OT.   - Patient will return to clinic in 3 weeks with CBC, metabolic panel, PSA, restaging 68-Ga PSMA-11 PET/CT scan to evaluate for disease progression and a visit with Dr. Plunkett for review.     Addendum   Following our visit patient's weight was obtained prior to chemotherapy. He has had a 22 lb weight gain in 9 days. He started lasix 20 mg daily last week. We will recheck a BNP today and recommend increasing lasix to 40 mg daily and follow up with PCP within 24-48 hours for further adjustments as needed. If BNP is stable, will plan to proceed with docetaxel as planned with close PCP follow up.  BNP is stable from 8/2024,will proceed with docetaxel today as planned.      36 minutes spent on the date of the encounter doing chart review, review of test results, interpretation of tests, patient visit, and documentation     Piedad Ji PA-C

## 2024-10-07 ENCOUNTER — MEDICAL CORRESPONDENCE (OUTPATIENT)
Dept: HEALTH INFORMATION MANAGEMENT | Facility: CLINIC | Age: 74
End: 2024-10-07
Payer: COMMERCIAL

## 2024-10-07 ENCOUNTER — TELEPHONE (OUTPATIENT)
Dept: CARDIOLOGY | Facility: CLINIC | Age: 74
End: 2024-10-07
Payer: COMMERCIAL

## 2024-10-08 NOTE — TELEPHONE ENCOUNTER
PT and OT has him up and taking steps with the aid of an assisted walker in hopes to get out of [place] for the winter. Updated patient, about labs and to continue lasix 20 and KCL 20 meq daily. Updated patient he can see Dr Gonsales when he does return from [place]    Will have PET scan for wound later in the month. Wanted to update care team.

## 2024-10-09 ENCOUNTER — MEDICAL CORRESPONDENCE (OUTPATIENT)
Dept: HEALTH INFORMATION MANAGEMENT | Facility: CLINIC | Age: 74
End: 2024-10-09
Payer: COMMERCIAL

## 2024-10-09 ENCOUNTER — TELEPHONE (OUTPATIENT)
Dept: FAMILY MEDICINE | Facility: CLINIC | Age: 74
End: 2024-10-09
Payer: COMMERCIAL

## 2024-10-09 NOTE — TELEPHONE ENCOUNTER
PT REQUESTING PHONE CALL FROM PCP 10/10/2024  REQUESTING PCP FAX A FORM TO RECEIVE DURABLE MEDICAL EQUIPMENT (4 WHEEL WALKER) FAXED TO [facility]([phone])

## 2024-10-10 NOTE — CONFIDENTIAL NOTE
I printed the order    Placed in team basket    Please fax to listed number and notify patient     Thanks    Richi Jaramillo MD

## 2024-10-11 ENCOUNTER — TELEPHONE (OUTPATIENT)
Dept: FAMILY MEDICINE | Facility: CLINIC | Age: 74
End: 2024-10-11
Payer: COMMERCIAL

## 2024-10-11 DIAGNOSIS — C77.8 MALIGNANT NEOPLASM METASTATIC TO LYMPH NODES OF MULTIPLE SITES (H): Primary | ICD-10-CM

## 2024-10-11 DIAGNOSIS — C61 PROSTATE CANCER (H): ICD-10-CM

## 2024-10-11 DIAGNOSIS — C79.51 METASTASIS TO BONE (H): ICD-10-CM

## 2024-10-11 DIAGNOSIS — Z51.11 ENCOUNTER FOR ANTINEOPLASTIC CHEMOTHERAPY: ICD-10-CM

## 2024-10-11 NOTE — TELEPHONE ENCOUNTER
New Medication Request        What medication are you requesting?: Z-Prasanna (Zithromax)     Reason for medication request: Sinus infection     Have you taken this medication before?: Yes:     Controlled Substance Agreement on file:   CSA -- Patient Level:    CSA: None found at the patient level.         Patient offered an appointment? No    Preferred Pharmacy:   [id number] - [place], [place] - [address] [address] [facility]  [address] [facility] [address]  Phone: [phone] Fax: [phone]    [facility] - [place], [place] - [address]  [address]  [address]  [facility] [address]  Phone: [phone] Fax: [phone]    [facility] [address] [facility] [address]  [address] [place], [place] [address]MAUDE MONTE  [facility] [address]  Phone: [phone] Fax: [phone]    [facility] [address] [facility] [place] [facility] [address]  Phone: [phone] Fax: [phone]      Okay to leave a detailed message?: Yes at Home number on file [phone] (home)

## 2024-10-11 NOTE — TELEPHONE ENCOUNTER
Please assist in scheduling an appointment for refills of medication requested     Kelley Suarez RN

## 2024-10-11 NOTE — TELEPHONE ENCOUNTER
"Please let patient know that an appointment is needed before provider can prescribe treatment for sinus infection  Not sure if the appointment scheduled for 10/14/24 is for this issue as it just says \"follow-up\"    Please sign up for mychart to initiate an Evisit or help patient schedule a virtual appointment with any available provider in the system today.  Can also check virtual urgent care schedule.  In-person urgent care is available over the weekend if needed    Sigifredo Hinds RN  [facility]    "

## 2024-10-14 ENCOUNTER — VIRTUAL VISIT (OUTPATIENT)
Dept: FAMILY MEDICINE | Facility: CLINIC | Age: 74
End: 2024-10-14
Payer: COMMERCIAL

## 2024-10-14 DIAGNOSIS — I10 HYPERTENSION GOAL BP (BLOOD PRESSURE) < 140/90: ICD-10-CM

## 2024-10-14 DIAGNOSIS — M25.552 HIP PAIN, LEFT: ICD-10-CM

## 2024-10-14 DIAGNOSIS — D64.9 ANEMIA, UNSPECIFIED TYPE: ICD-10-CM

## 2024-10-14 DIAGNOSIS — R19.7 DIARRHEA, UNSPECIFIED TYPE: ICD-10-CM

## 2024-10-14 DIAGNOSIS — J01.90 ACUTE SINUSITIS WITH SYMPTOMS > 10 DAYS: Primary | ICD-10-CM

## 2024-10-14 DIAGNOSIS — J20.9 ACUTE BRONCHITIS WITH SYMPTOMS > 10 DAYS: ICD-10-CM

## 2024-10-14 DIAGNOSIS — R53.83 FATIGUE, UNSPECIFIED TYPE: ICD-10-CM

## 2024-10-14 PROCEDURE — 99443 PR PHYSICIAN TELEPHONE EVALUATION 21-30 MIN: CPT | Mod: 93 | Performed by: FAMILY MEDICINE

## 2024-10-14 RX ORDER — AZITHROMYCIN 250 MG/1
TABLET, FILM COATED ORAL
Qty: 6 TABLET | Refills: 1 | Status: CANCELLED | OUTPATIENT
Start: 2024-10-14 | End: 2024-10-18

## 2024-10-14 RX ORDER — SPIRONOLACTONE 25 MG/1
25 TABLET ORAL DAILY
Qty: 90 TABLET | Refills: 1 | Status: CANCELLED | OUTPATIENT
Start: 2024-10-14

## 2024-10-14 NOTE — CONFIDENTIAL NOTE
We talked about these types of meds earlier today in phone visit  Hold off on spironolactone for now  We had prescribed augmentin for antibiotic  Richi Jaramillo MD

## 2024-10-14 NOTE — TELEPHONE ENCOUNTER
Pending Prescriptions:                       Disp   Refills    azithromycin (ZITHROMAX) 250 MG tablet    6 tabl*1            Sig: Take 2 tablets (500 mg) by mouth daily for 1 day,           THEN 1 tablet (250 mg) daily for 4 days.    spironolactone (ALDACTONE) 25 MG tablet   90 tab*1            Sig: Take 1 tablet (25 mg) by mouth daily.    These medications aren't on the current med list.  Barbie Reynolds CMA

## 2024-10-14 NOTE — PROGRESS NOTES
Canelo is a 74 year old who is being evaluated via a billable telephone visit.    What phone number would you like to be contacted at? [phone]  How would you like to obtain your AVS? Mail a copy  Originating Location (pt. Location): Home    Distant Location (provider location):  On-site        Subjective   Canelo is a 74 year old, presenting for the following health issues:  RECHECK (Discuss medication. Follow-up from last visit. 4 wheel walker. Cancer)      10/14/2024     9:12 AM   Additional Questions   Roomed by Shilpi DUNBAR CMA   Accompanied by Self         10/14/2024     9:12 AM   Patient Reported Additional Medications   Patient reports taking the following new medications None     History of Present Illness       Reason for visit:  Discuss medications, Cancer, Walker    He eats 0-1 servings of fruits and vegetables daily.He consumes 0 sweetened beverage(s) daily.He exercises with enough effort to increase his heart rate 20 to 29 minutes per day.  He exercises with enough effort to increase his heart rate 4 days per week.   He is taking medications regularly.           Anal fistula back    Some bleeding there    Had flu shot     Looser stools    Getting meals form [employer]    Occasional blood on stool from fistula    Up to date colonoscopy, done 2022    Patient to get another pet scan later this week    To get wound check soon per specialist    4 wheel walker in process    Some  bloating    Blood pressure 140/70 this am    Heart rate 94    Eating okay    Chemo continues    Some colored phlegm, ongoing              Objective           Vitals:  No vitals were obtained today due to virtual visit.    Physical Exam   General: Alert and no distress //Respiratory: No audible wheeze, cough, or shortness of breath // Psychiatric:  Appropriate affect, tone, and pace of words    ASSESSMENT / PLAN:  (J01.90) Acute sinusitis with symptoms > 10 days  (primary encounter diagnosis)  Comment: symptoms continue.  Will treat with  antibiotics, warned of side effects. He has had this multiple times in past.  Plan: amoxicillin-clavulanate (AUGMENTIN) 875-125 MG         tablet             (M25.552) Hip pain, left  Comment: patient wanted xray to be done next time he is at [facility]  Plan: XR Hip Left 2-3 Views             (D64.9) Anemia, unspecified type  Comment: likely multifactorial cancer , chemo, etc.  Will check a few labs with next blood draw   Plan: Folate, Vitamin B12, Ferritin, Iron and iron         binding capacity             (R19.7) Diarrhea, unspecified type  Comment: discussed in detail.    Plan: could try fiber supplement to see if this helps stools     (R53.83) Fatigue, unspecified type  Comment: multifactorial   Plan: as above       I reviewed the patient's medications, allergies, medical history, family history, and social history.    Richi Jaramillo MD           Phone call duration: 24 minutes ( 9:21 to 9:45 am )  Signed Electronically by: Richi Jaramillo MD

## 2024-10-14 NOTE — PATIENT INSTRUCTIONS
Get the augmentin prescription from pharmacy for sinusitis.  This may may diarrhea worse.    Can use an over the counter fiber supplement daily.    Stay well hydrated    I put in orders for xray left hip and some other labs to be done when you are at the [facility]

## 2024-10-15 ENCOUNTER — TELEPHONE (OUTPATIENT)
Dept: ONCOLOGY | Facility: CLINIC | Age: 74
End: 2024-10-15
Payer: COMMERCIAL

## 2024-10-15 NOTE — TELEPHONE ENCOUNTER
FCO APPROVED    Medication: NUBEQA 300 MG PO TABS  Amount: $ 3,500  Foundation Name: PAF  Foundation Phone:    Foundation Effective Date: 4/18/2024  Foundation Expiration Date: 10/15/2025  Additional Information:   Patient Notified: yes        Thank you,    Ade Fowler  Oncology Pharmacy Liaison II  [email or web address]  Phone: [phone]  Fax: [phone]

## 2024-10-16 NOTE — PROCEDURES
Radiotherapy Treatment Summary  Date of Report: 2024     PATIENT: LAURENT PEREIRA  MEDICAL RECORD NO: [id number]  : 1950     DIAGNOSIS: C79.51 Secondary malignant neoplasm of bone     INTENT OF RADIOTHERAPY: Palliative     PATHOLOGY:  Adenocarcinoma                                  STAGE:      Treatment Summary:  Radiation Oncology - Course: 1 Protocol:   Treatment Site  Current   Dose  Modality  From  To  Elapsed   Days  Fx.  Left hip   2,500   cGy  18 X   7/29/2024   2024    4   5                             Total Dose(cGy): 2,500   Dose per fraction(cGy): 500     COMMENTS: The patient is a 74 year old man with metastatic adenocarcinoma of the prostate. He   had been in his usual state of health until he tripped and had a ground level fall on 2024. On   admission to the ED, he was found to have an intertrochanteric fracture of the L femur. He was taken   to the OR by orthopedic surgery on 2024 for ORIF. Histology results from the surgery revealed   metastatic adenocarcinoma of the prostate. His previous PSA (2023) had been just 1.49.     He received post-operative adjuvant palliative radiation therapy to the L hip through AP/PA fields with   customized MLC blocking for a total of 2,500 cGy over 5 fractions on consecutive weekdays. There   was grade 1 toxicity of fatigue relieved by rest. He otherwise tolerated the treatment well.  FOLLOW UP PLAN:   PRN follow up  Resident Physician : Jaime Melo MD PhD  Staff Physician : BETTINA Gan M.D.                  Radiation Oncology:  [facility] [address] [place] [address]

## 2024-10-17 ENCOUNTER — NURSE TRIAGE (OUTPATIENT)
Dept: ONCOLOGY | Facility: CLINIC | Age: 74
End: 2024-10-17
Payer: COMMERCIAL

## 2024-10-17 NOTE — TELEPHONE ENCOUNTER
"Pt calling to report he did not receive his oral chemo as scheduled. He has none on hand, last dose was yesterday. He is very upset. He states he called [facility] around 4:30pm and spoke w/ someone there who \"just blamed someone else\", could not say what the pharmacy told him or if medication was being delivered today or not. Writer advised pt call pharmacy back to ask if medication was coming today and get a name of who he talked to. He kept saying, \"I don't trust her\", said \"they blame the doctor. You guys are going to lose patients this way.\"  Writer participated in active listening. Informed pt will send message to care team and Ade oral chemo pharmacy liaison, but d/t time of day, likely will not be addressed until tomorrow. Pt repeating previous comments and making threats of suing someone.     Pt questioning why he should come to PET scan tomorrow if he doesn't have medicine?- writer advised coming to PET scan if he took last dose of medication yesterday to see if previous treatments have been effective.     Pt wondering if missing 3-4-5 days, if this will \"cost him days\"?   Questions if he should \"change providers\".   Writer suggested a different pharmacy for future fills.   Again informed pt will send a message to the care team, but this will not get addressed today and again advised he call pharmacy to see if shipment is coming today.   "

## 2024-10-18 ENCOUNTER — HOSPITAL ENCOUNTER (OUTPATIENT)
Dept: GENERAL RADIOLOGY | Facility: CLINIC | Age: 74
Discharge: HOME | End: 2024-10-18
Attending: INTERNAL MEDICINE
Payer: COMMERCIAL

## 2024-10-18 ENCOUNTER — MEDICAL CORRESPONDENCE (OUTPATIENT)
Dept: HEALTH INFORMATION MANAGEMENT | Facility: CLINIC | Age: 74
End: 2024-10-18

## 2024-10-18 ENCOUNTER — HOSPITAL ENCOUNTER (OUTPATIENT)
Dept: PET IMAGING | Facility: CLINIC | Age: 74
Discharge: HOME | End: 2024-10-18
Attending: INTERNAL MEDICINE
Payer: COMMERCIAL

## 2024-10-18 DIAGNOSIS — C61 PROSTATE CANCER (H): ICD-10-CM

## 2024-10-18 DIAGNOSIS — C79.51 METASTASIS TO BONE (H): ICD-10-CM

## 2024-10-18 DIAGNOSIS — C77.8 MALIGNANT NEOPLASM METASTATIC TO LYMPH NODES OF MULTIPLE SITES (H): ICD-10-CM

## 2024-10-18 PROCEDURE — 74177 CT ABD & PELVIS W/CONTRAST: CPT | Mod: 26 | Performed by: RADIOLOGY

## 2024-10-18 PROCEDURE — 73502 X-RAY EXAM HIP UNI 2-3 VIEWS: CPT | Mod: 26 | Performed by: RADIOLOGY

## 2024-10-18 PROCEDURE — 78812 PET IMAGE SKULL-THIGH: CPT | Mod: 26 | Performed by: RADIOLOGY

## 2024-10-18 PROCEDURE — 250N000011 HC RX IP 250 OP 636: Performed by: INTERNAL MEDICINE

## 2024-10-18 PROCEDURE — 73502 X-RAY EXAM HIP UNI 2-3 VIEWS: CPT

## 2024-10-18 PROCEDURE — 343N000001 HC RX 343 MED OP 636: Performed by: INTERNAL MEDICINE

## 2024-10-18 PROCEDURE — A9596 HC RX 343 MED OP 636: HCPCS | Performed by: INTERNAL MEDICINE

## 2024-10-18 PROCEDURE — 78815 PET IMAGE W/CT SKULL-THIGH: CPT | Mod: PS

## 2024-10-18 PROCEDURE — 74177 CT ABD & PELVIS W/CONTRAST: CPT

## 2024-10-18 PROCEDURE — 71260 CT THORAX DX C+: CPT | Mod: 26 | Performed by: RADIOLOGY

## 2024-10-18 RX ORDER — IOPAMIDOL 755 MG/ML
10-135 INJECTION, SOLUTION INTRAVASCULAR ONCE
Status: COMPLETED | OUTPATIENT
Start: 2024-10-18 | End: 2024-10-18

## 2024-10-18 RX ORDER — HEPARIN SODIUM (PORCINE) LOCK FLUSH IV SOLN 100 UNIT/ML 100 UNIT/ML
500 SOLUTION INTRAVENOUS ONCE
Status: COMPLETED | OUTPATIENT
Start: 2024-10-18 | End: 2024-10-18

## 2024-10-18 RX ADMIN — KIT FOR THE PREPARATION OF GALLIUM GA 68 GOZETOTIDE INJECTION 5.2 MILLICURIE: KIT INTRAVENOUS at 12:36

## 2024-10-18 RX ADMIN — HEPARIN SODIUM (PORCINE) LOCK FLUSH IV SOLN 100 UNIT/ML 500 UNITS: 100 SOLUTION at 13:46

## 2024-10-18 RX ADMIN — IOPAMIDOL 135 ML: 755 INJECTION, SOLUTION INTRAVENOUS at 13:41
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
## 2024-10-22 ENCOUNTER — TELEPHONE (OUTPATIENT)
Dept: FAMILY MEDICINE | Facility: CLINIC | Age: 74
End: 2024-10-22
Payer: COMMERCIAL

## 2024-10-22 ENCOUNTER — TELEPHONE (OUTPATIENT)
Dept: CARDIOLOGY | Facility: CLINIC | Age: 74
End: 2024-10-22
Payer: COMMERCIAL

## 2024-10-22 DIAGNOSIS — I50.32 CHRONIC DIASTOLIC HEART FAILURE (H): ICD-10-CM

## 2024-10-22 DIAGNOSIS — I42.1 HOCM (HYPERTROPHIC OBSTRUCTIVE CARDIOMYOPATHY) (H): Primary | ICD-10-CM

## 2024-10-22 NOTE — TELEPHONE ENCOUNTER
Reason for call:  Other   Patient called regarding (reason for call): call back  Additional comments: patient hasn't heard back on his xray results  He is having increased hip pain please call to advise    Phone number to reach patient:  Home number on file [phone] (home)    Best Time:  any    Can we leave a detailed message on this number?  YES    Travel screening: Not Applicable

## 2024-10-22 NOTE — TELEPHONE ENCOUNTER
10/23/2024 4:41PM Magalie Nair  Patient confirmed scheduled appointment:  Date: 10/24/2024  Time: 7:40AM  Visit type: Enrollment CORE  Provider: Mirna Castañeda PA-C  Location: [address], 3rd Floor [address], [place] [address]  Testing/imaging: Labs (2nd Floor [facility]) prior at 6:30AM, Return CCSL w/ Dr. Plunkett (2nd Floor E) prior at 7AM, Oncology Infusion (2nd Floor E) at 8AM, New Wound Nurse w/ Viktoriya Beverly RN (4th Floor K) at 9:30AM  Additional notes: 10/23 Ok to schedule per [facility]. DANTE     Left Voicemail (2nd Attempt) MAX ATTEMPTS REACHED- LVM for the patient to call back and schedule the following:    Appointment type: Return CORE  Provider: Maricruz Willis  Return date: 11/14/2024 at 7:20AM (appt on hold)  Specialty phone number: [phone] option 1  Additional appointment(s) needed: labs prior  Additonal Notes: 10/23 Scheduled Enrollment CORE w/ Mirna Castañeda w/ labs prior 10/24. LVM (No MYC) to offer Return CORE w/ Maricruz Willis w/ labs prior 11/14 (appt is on hold). DANTE Nair 10/23/2024 4:41PM        10/22/2024 4:18PM Magalie Nair  Left Voicemail (1st Attempt) for the patient to call back and schedule the following:    Appointment type: Enrollment CORE  Provider: Mirna Castañeda PA-C  Return date: 10/23/2024 at 8:20AM (ok to overbook per [facility])  Specialty phone number: [phone] option 1  Additional appointment(s) needed: labs prior, Return CORE w/ Maricruz Willis 11/14 at 7:20AM (appt on hold) w/ labs prior  Additonal Notes: 10/22 LVM (No MYC) for pt to schedule Enrollment CORE w/ Mirna Castañeda 10/23 at 8:30AM (ok to overbook per [facility]) w/ labs prior and Return CORE w/ Maricruz Willis 11/14 at 7:20AM (appt on hold) w/ labs prior. DANTE Nair 10/22/2024 4:18PM

## 2024-10-22 NOTE — TELEPHONE ENCOUNTER
----- Message from Zacarias RIVERA sent at 10/22/2024  3:55 PM CDT -----  Hello,    I reach out to this patient to help arrange for CORE visit. I offered 10/23 at 8:20 with Mirna Castañeda with labs prior at 7:45 or 11/14 at 7:20 am with Zita Willis with labs prior.     Can you please reach out again to help set up appointment?    Thank you!  Zacarias [facility], RN, BSN, PHN  RN Care Coordinator  [facility]  Phone: [phone]  Fax: [phone]

## 2024-10-22 NOTE — TELEPHONE ENCOUNTER
Reason for Call:  Appointment Request    Patient requesting this type of appt:  Gucci Logan    Requested provider: Richi Jaramillo    Reason patient unable to be scheduled: Not within requested timeframe    When does patient want to be seen/preferred time: Same day via telephone visit. He would like the appointment to be 10/23/2024 Wednesday    Comments: Pt would like to go over xray results with pcp    Okay to leave a detailed message?: Yes at Cell number on file:    Telephone Information:   Mobile [phone]       Call taken on 10/22/2024 at 6:30 PM by Clara Garcia

## 2024-10-23 ENCOUNTER — VIRTUAL VISIT (OUTPATIENT)
Dept: FAMILY MEDICINE | Facility: CLINIC | Age: 74
End: 2024-10-23
Payer: COMMERCIAL

## 2024-10-23 ENCOUNTER — PATIENT OUTREACH (OUTPATIENT)
Dept: ONCOLOGY | Facility: CLINIC | Age: 74
End: 2024-10-23
Payer: COMMERCIAL

## 2024-10-23 ENCOUNTER — TELEPHONE (OUTPATIENT)
Dept: GASTROENTEROLOGY | Facility: CLINIC | Age: 74
End: 2024-10-23

## 2024-10-23 DIAGNOSIS — M25.552 HIP PAIN, LEFT: ICD-10-CM

## 2024-10-23 DIAGNOSIS — R18.8 ASCITES OF LIVER: ICD-10-CM

## 2024-10-23 DIAGNOSIS — C79.51 PROSTATE CANCER METASTATIC TO BONE (H): ICD-10-CM

## 2024-10-23 DIAGNOSIS — M62.81 GENERALIZED MUSCLE WEAKNESS: ICD-10-CM

## 2024-10-23 DIAGNOSIS — C61 PROSTATE CANCER METASTATIC TO BONE (H): ICD-10-CM

## 2024-10-23 DIAGNOSIS — F43.9 STRESS: Primary | ICD-10-CM

## 2024-10-23 PROCEDURE — 99443 PR PHYSICIAN TELEPHONE EVALUATION 21-30 MIN: CPT | Mod: 93 | Performed by: FAMILY MEDICINE

## 2024-10-23 NOTE — TELEPHONE ENCOUNTER
Called patient to schedule. No answer. LM for call back. If patient calls back please assist in scheduling     Sanjeev-

## 2024-10-23 NOTE — TELEPHONE ENCOUNTER
"Spoke with the patient. Pt reports he had a recent PET & CT Scan he would like Dr. Renteria to review. Patient would then like to get an appointment earlier then March to go over findings. Pt did mention findings regarding  # 4 on the CT scan stating;Cirrhotic liver morphology with sequela of portal venous hypertension, with increasing small to moderate volume diffuse  abdominopelvic ascites.     Patient would like a virtual visit if possible. Patient reports he is in a wheelchair from a hip fracture and getting around is difficulty.    Ava SNELL LPN  Hepatology Clinic   --------------  [facility]    Phone Message    May a detailed message be left on voicemail: yes     Reason for Call: Other: Patient called to make an appt with Dr. Renteria; unfortunately, next available is 3/11/25.  He did book that appt and get put on waiting list.   Patient would like Dr. Renteria to review his 10/18/24 scans and records, then call him back.  There are concerns that he feels only Dr. Renteria is able to handle.  Patient wants a call back from Dr. Renteria.   (As a footnote, patient stated to write to Dr. Renteria that he wanted to see him to \"save his a**\" for a few more years and laughed. He was a very nice man!)     Action Taken: Message routed to:  [facility]    Travel Screening: Not Applicable                                                                         "

## 2024-10-23 NOTE — PROGRESS NOTES
[facility]: Cancer Care                                                                                          Patient left VM reporting leg swelling inquiring if he can get his infusion this week.  Called back, left message advising patient to come into clinic so his leg swelling can be evaluated in-person to determine whether or not he can get an infusion.  ANTWANTCB.    Piedad SEBASTIAN RN  Cancer Care Coordinator  [facility]

## 2024-10-23 NOTE — PROGRESS NOTES
Canelo is a 74 year old who is being evaluated via a billable telephone visit.    What phone number would you like to be contacted at? [phone]   How would you like to obtain your AVS? Mail a copy  Originating Location (pt. Location): Home    Distant Location (provider location):  On-site        Subjective   Canelo is a 74 year old, presenting for the following health issues:  Follow Up        10/14/2024     9:12 AM   Additional Questions   Roomed by Shilpi DUNBAR CMA   Accompanied by Self     HPI      Visiting nurse was coming for the wounds but not currently    To see new wound nurse tomorrow    Lots of edema currently per patient    Sweating or drainage    Hard for patient to cut nails    Left hip getting worse over last 2-3 weeks    Hard to transfer, painful    Has some pain meds at home but not taking recently     Now patient going to go get a 4 wheel walker    To get another infusion tomorrow    Seeing oncologist also             Objective    Vitals - Patient Reported  Systolic (Patient Reported): 125  Diastolic (Patient Reported): 60  Temperature (Patient Reported): 97.7  F (36.5  C)  Pulse (Patient Reported): 75        Physical Exam   General: Alert and no distress //Respiratory: No audible wheeze, cough, or shortness of breath // Psychiatric:  Appropriate affect, tone, and pace of words    ASSESSMENT / PLAN:  (F43.9) Stress  (primary encounter diagnosis)  Comment: patient stressed,  mood okay all things considered.    Plan: monitor.      (M25.552) Hip pain, left  Comment: recent hip xray showed stable hardware and healing pathologic fracture.    Plan: try to increase mobility as able.  He is going to get the 4 wheel walker soon.     (C61,  C79.51) Prostate cancer metastatic to bone (H)  Comment: patient just had scans and is going to see oncology tomorrow.  Patient wanted a heads up on the results and I did give him a general idea from pet scan.   Plan: per oncology     (M62.81) Generalized muscle  weakness  Comment: again stressed concern over weakness  Plan: as above     (R18.8) Ascites of liver  Comment: scan also suggested some increase in ascites  Plan: see what oncology thinks at appointment tomorrow    Patient to get new wound care person soon      I reviewed the patient's medications, allergies, medical history, family history, and social history.    Richi Jaramillo MD           Phone call duration: 27 minutes  Signed Electronically by: Richi Jaramillo MD

## 2024-10-24 ENCOUNTER — INFUSION THERAPY VISIT (OUTPATIENT)
Dept: ONCOLOGY | Facility: CLINIC | Age: 74
End: 2024-10-24
Payer: COMMERCIAL

## 2024-10-24 ENCOUNTER — APPOINTMENT (OUTPATIENT)
Dept: LAB | Facility: CLINIC | Age: 74
End: 2024-10-24
Payer: COMMERCIAL

## 2024-10-24 ENCOUNTER — OFFICE VISIT (OUTPATIENT)
Dept: CARDIOLOGY | Facility: CLINIC | Age: 74
End: 2024-10-24
Attending: PHYSICIAN ASSISTANT
Payer: COMMERCIAL

## 2024-10-24 ENCOUNTER — ONCOLOGY VISIT (OUTPATIENT)
Dept: ONCOLOGY | Facility: CLINIC | Age: 74
End: 2024-10-24
Payer: COMMERCIAL

## 2024-10-24 VITALS
OXYGEN SATURATION: 97 % | HEIGHT: 70 IN | RESPIRATION RATE: 16 BRPM | DIASTOLIC BLOOD PRESSURE: 51 MMHG | HEART RATE: 74 BPM | BODY MASS INDEX: 32.82 KG/M2 | TEMPERATURE: 97.8 F | WEIGHT: 229.28 LBS | SYSTOLIC BLOOD PRESSURE: 108 MMHG

## 2024-10-24 VITALS — HEART RATE: 68 BPM | OXYGEN SATURATION: 99 % | DIASTOLIC BLOOD PRESSURE: 66 MMHG | SYSTOLIC BLOOD PRESSURE: 120 MMHG

## 2024-10-24 VITALS — TEMPERATURE: 97.6 F | BODY MASS INDEX: 39.04 KG/M2 | WEIGHT: 272.1 LBS

## 2024-10-24 DIAGNOSIS — Z51.11 ENCOUNTER FOR ANTINEOPLASTIC CHEMOTHERAPY: ICD-10-CM

## 2024-10-24 DIAGNOSIS — I42.1 HOCM (HYPERTROPHIC OBSTRUCTIVE CARDIOMYOPATHY) (H): Primary | ICD-10-CM

## 2024-10-24 DIAGNOSIS — C77.8 MALIGNANT NEOPLASM METASTATIC TO LYMPH NODES OF MULTIPLE SITES (H): ICD-10-CM

## 2024-10-24 DIAGNOSIS — C61 PROSTATE CANCER (H): ICD-10-CM

## 2024-10-24 DIAGNOSIS — D64.9 ANEMIA, UNSPECIFIED TYPE: ICD-10-CM

## 2024-10-24 DIAGNOSIS — C79.51 METASTASIS TO BONE (H): ICD-10-CM

## 2024-10-24 DIAGNOSIS — C61 PROSTATE CANCER (H): Primary | ICD-10-CM

## 2024-10-24 DIAGNOSIS — R18.8 CIRRHOSIS OF LIVER WITH ASCITES, UNSPECIFIED HEPATIC CIRRHOSIS TYPE (H): ICD-10-CM

## 2024-10-24 DIAGNOSIS — B02.9 HERPES ZOSTER WITHOUT COMPLICATION: ICD-10-CM

## 2024-10-24 DIAGNOSIS — C77.8 MALIGNANT NEOPLASM METASTATIC TO LYMPH NODES OF MULTIPLE SITES (H): Primary | ICD-10-CM

## 2024-10-24 DIAGNOSIS — K74.60 CIRRHOSIS OF LIVER WITH ASCITES, UNSPECIFIED HEPATIC CIRRHOSIS TYPE (H): ICD-10-CM

## 2024-10-24 DIAGNOSIS — I42.1 HOCM (HYPERTROPHIC OBSTRUCTIVE CARDIOMYOPATHY) (H): ICD-10-CM

## 2024-10-24 DIAGNOSIS — I50.32 CHRONIC DIASTOLIC HEART FAILURE (H): ICD-10-CM

## 2024-10-24 LAB
ALBUMIN SERPL BCG-MCNC: 2.8 G/DL (ref 3.5–5.2)
ALP SERPL-CCNC: 148 U/L (ref 40–150)
ALT SERPL W P-5'-P-CCNC: 21 U/L (ref 0–70)
ANION GAP SERPL CALCULATED.3IONS-SCNC: 6 MMOL/L (ref 7–15)
AST SERPL W P-5'-P-CCNC: 32 U/L (ref 0–45)
BASOPHILS # BLD AUTO: 0.1 10E3/UL (ref 0–0.2)
BASOPHILS NFR BLD AUTO: 1 %
BILIRUB SERPL-MCNC: 0.8 MG/DL
BUN SERPL-MCNC: 29 MG/DL (ref 8–23)
CALCIUM SERPL-MCNC: 8.5 MG/DL (ref 8.8–10.4)
CHLORIDE SERPL-SCNC: 113 MMOL/L (ref 98–107)
CREAT SERPL-MCNC: 1.26 MG/DL (ref 0.67–1.17)
EGFRCR SERPLBLD CKD-EPI 2021: 60 ML/MIN/1.73M2
EOSINOPHIL # BLD AUTO: 0 10E3/UL (ref 0–0.7)
EOSINOPHIL NFR BLD AUTO: 0 %
ERYTHROCYTE [DISTWIDTH] IN BLOOD BY AUTOMATED COUNT: 22.6 % (ref 10–15)
FERRITIN SERPL-MCNC: 416 NG/ML (ref 31–409)
FOLATE SERPL-MCNC: 24.3 NG/ML (ref 4.6–34.8)
GLUCOSE SERPL-MCNC: 105 MG/DL (ref 70–99)
HCO3 SERPL-SCNC: 21 MMOL/L (ref 22–29)
HCT VFR BLD AUTO: 25.7 % (ref 40–53)
HGB BLD-MCNC: 8.2 G/DL (ref 13.3–17.7)
IMM GRANULOCYTES # BLD: 0.1 10E3/UL
IMM GRANULOCYTES NFR BLD: 1 %
IRON BINDING CAPACITY (ROCHE): 191 UG/DL (ref 240–430)
IRON SATN MFR SERPL: 31 % (ref 15–46)
IRON SERPL-MCNC: 59 UG/DL (ref 61–157)
LYMPHOCYTES # BLD AUTO: 0.8 10E3/UL (ref 0.8–5.3)
LYMPHOCYTES NFR BLD AUTO: 11 %
MCH RBC QN AUTO: 33.3 PG (ref 26.5–33)
MCHC RBC AUTO-ENTMCNC: 31.9 G/DL (ref 31.5–36.5)
MCV RBC AUTO: 105 FL (ref 78–100)
MONOCYTES # BLD AUTO: 0.5 10E3/UL (ref 0–1.3)
MONOCYTES NFR BLD AUTO: 7 %
NEUTROPHILS # BLD AUTO: 5.8 10E3/UL (ref 1.6–8.3)
NEUTROPHILS NFR BLD AUTO: 80 %
NRBC # BLD AUTO: 0 10E3/UL
NRBC BLD AUTO-RTO: 0 /100
NT-PROBNP SERPL-MCNC: 6010 PG/ML (ref 0–900)
PLATELET # BLD AUTO: 75 10E3/UL (ref 150–450)
POTASSIUM SERPL-SCNC: 4.6 MMOL/L (ref 3.4–5.3)
PROT SERPL-MCNC: 5.2 G/DL (ref 6.4–8.3)
PSA SERPL DL<=0.01 NG/ML-MCNC: 3.41 NG/ML (ref 0–6.5)
RBC # BLD AUTO: 2.46 10E6/UL (ref 4.4–5.9)
SODIUM SERPL-SCNC: 140 MMOL/L (ref 135–145)
VIT B12 SERPL-MCNC: >4000 PG/ML (ref 232–1245)
WBC # BLD AUTO: 7.2 10E3/UL (ref 4–11)

## 2024-10-24 PROCEDURE — 250N000011 HC RX IP 250 OP 636: Performed by: INTERNAL MEDICINE

## 2024-10-24 PROCEDURE — G0463 HOSPITAL OUTPT CLINIC VISIT: HCPCS | Mod: 25 | Performed by: INTERNAL MEDICINE

## 2024-10-24 PROCEDURE — 36591 DRAW BLOOD OFF VENOUS DEVICE: CPT | Performed by: INTERNAL MEDICINE

## 2024-10-24 PROCEDURE — 96413 CHEMO IV INFUSION 1 HR: CPT

## 2024-10-24 PROCEDURE — 82607 VITAMIN B-12: CPT | Performed by: INTERNAL MEDICINE

## 2024-10-24 PROCEDURE — 82746 ASSAY OF FOLIC ACID SERUM: CPT | Performed by: INTERNAL MEDICINE

## 2024-10-24 PROCEDURE — 80053 COMPREHEN METABOLIC PANEL: CPT | Performed by: INTERNAL MEDICINE

## 2024-10-24 PROCEDURE — 99214 OFFICE O/P EST MOD 30 MIN: CPT | Performed by: PHYSICIAN ASSISTANT

## 2024-10-24 PROCEDURE — 82728 ASSAY OF FERRITIN: CPT | Performed by: INTERNAL MEDICINE

## 2024-10-24 PROCEDURE — 84153 ASSAY OF PSA TOTAL: CPT | Performed by: INTERNAL MEDICINE

## 2024-10-24 PROCEDURE — G0463 HOSPITAL OUTPT CLINIC VISIT: HCPCS | Performed by: PHYSICIAN ASSISTANT

## 2024-10-24 PROCEDURE — 83880 ASSAY OF NATRIURETIC PEPTIDE: CPT | Performed by: INTERNAL MEDICINE

## 2024-10-24 PROCEDURE — 96372 THER/PROPH/DIAG INJ SC/IM: CPT | Performed by: INTERNAL MEDICINE

## 2024-10-24 PROCEDURE — 96377 APPLICATON ON-BODY INJECTOR: CPT | Mod: 59 | Performed by: INTERNAL MEDICINE

## 2024-10-24 PROCEDURE — 85025 COMPLETE CBC W/AUTO DIFF WBC: CPT | Performed by: INTERNAL MEDICINE

## 2024-10-24 PROCEDURE — 96375 TX/PRO/DX INJ NEW DRUG ADDON: CPT

## 2024-10-24 PROCEDURE — 99214 OFFICE O/P EST MOD 30 MIN: CPT | Performed by: INTERNAL MEDICINE

## 2024-10-24 PROCEDURE — 83550 IRON BINDING TEST: CPT | Performed by: INTERNAL MEDICINE

## 2024-10-24 PROCEDURE — 258N000003 HC RX IP 258 OP 636: Performed by: INTERNAL MEDICINE

## 2024-10-24 RX ORDER — ONDANSETRON 2 MG/ML
8 INJECTION INTRAMUSCULAR; INTRAVENOUS ONCE
Status: COMPLETED | OUTPATIENT
Start: 2024-10-24 | End: 2024-10-24

## 2024-10-24 RX ORDER — METHYLPREDNISOLONE SODIUM SUCCINATE 125 MG/2ML
125 INJECTION INTRAMUSCULAR; INTRAVENOUS
Status: CANCELLED
Start: 2024-10-24

## 2024-10-24 RX ORDER — HEPARIN SODIUM (PORCINE) LOCK FLUSH IV SOLN 100 UNIT/ML 100 UNIT/ML
5 SOLUTION INTRAVENOUS
Status: CANCELLED | OUTPATIENT
Start: 2024-10-24

## 2024-10-24 RX ORDER — MEPERIDINE HYDROCHLORIDE 25 MG/ML
25 INJECTION INTRAMUSCULAR; INTRAVENOUS; SUBCUTANEOUS EVERY 30 MIN PRN
Status: CANCELLED | OUTPATIENT
Start: 2024-10-24

## 2024-10-24 RX ORDER — SPIRONOLACTONE 50 MG/1
50 TABLET, FILM COATED ORAL DAILY
Qty: 30 TABLET | Refills: 3 | Status: ON HOLD | OUTPATIENT
Start: 2024-10-24 | End: 2024-10-29

## 2024-10-24 RX ORDER — HEPARIN SODIUM (PORCINE) LOCK FLUSH IV SOLN 100 UNIT/ML 100 UNIT/ML
5 SOLUTION INTRAVENOUS
Status: DISCONTINUED | OUTPATIENT
Start: 2024-10-24 | End: 2024-10-24 | Stop reason: HOSPADM

## 2024-10-24 RX ORDER — ALBUTEROL SULFATE 90 UG/1
1-2 INHALANT RESPIRATORY (INHALATION)
Status: CANCELLED
Start: 2024-10-24

## 2024-10-24 RX ORDER — EPINEPHRINE 1 MG/ML
0.3 INJECTION, SOLUTION INTRAMUSCULAR; SUBCUTANEOUS EVERY 5 MIN PRN
Status: CANCELLED | OUTPATIENT
Start: 2024-10-24

## 2024-10-24 RX ORDER — HEPARIN SODIUM (PORCINE) LOCK FLUSH IV SOLN 100 UNIT/ML 100 UNIT/ML
500 SOLUTION INTRAVENOUS ONCE
Status: COMPLETED | OUTPATIENT
Start: 2024-10-24 | End: 2024-10-24

## 2024-10-24 RX ORDER — LORAZEPAM 2 MG/ML
0.5 INJECTION INTRAMUSCULAR EVERY 4 HOURS PRN
Status: CANCELLED | OUTPATIENT
Start: 2024-10-24

## 2024-10-24 RX ORDER — DIPHENHYDRAMINE HYDROCHLORIDE 50 MG/ML
50 INJECTION INTRAMUSCULAR; INTRAVENOUS
Status: CANCELLED
Start: 2024-10-24

## 2024-10-24 RX ORDER — ALBUTEROL SULFATE 0.83 MG/ML
2.5 SOLUTION RESPIRATORY (INHALATION)
Status: CANCELLED | OUTPATIENT
Start: 2024-10-24

## 2024-10-24 RX ORDER — DEXAMETHASONE SODIUM PHOSPHATE 4 MG/ML
12 INJECTION, SOLUTION INTRA-ARTICULAR; INTRALESIONAL; INTRAMUSCULAR; INTRAVENOUS; SOFT TISSUE ONCE
Status: COMPLETED | OUTPATIENT
Start: 2024-10-24 | End: 2024-10-24

## 2024-10-24 RX ORDER — HEPARIN SODIUM,PORCINE 10 UNIT/ML
5-20 VIAL (ML) INTRAVENOUS DAILY PRN
Status: CANCELLED | OUTPATIENT
Start: 2024-10-24

## 2024-10-24 RX ADMIN — DEXAMETHASONE SODIUM PHOSPHATE 12 MG: 4 INJECTION, SOLUTION INTRA-ARTICULAR; INTRALESIONAL; INTRAMUSCULAR; INTRAVENOUS; SOFT TISSUE at 09:19

## 2024-10-24 RX ADMIN — DOCETAXEL 138 MG: 20 INJECTION, SOLUTION, CONCENTRATE INTRAVENOUS at 10:03

## 2024-10-24 RX ADMIN — PEGFILGRASTIM 6 MG: KIT SUBCUTANEOUS at 11:07

## 2024-10-24 RX ADMIN — ONDANSETRON 8 MG: 2 INJECTION INTRAMUSCULAR; INTRAVENOUS at 09:19

## 2024-10-24 RX ADMIN — HEPARIN 5 ML: 100 SYRINGE at 11:05

## 2024-10-24 RX ADMIN — Medication 500 UNITS: at 06:54

## 2024-10-24 ASSESSMENT — PAIN SCALES - GENERAL
PAINLEVEL_OUTOF10: MODERATE PAIN (5)
PAINLEVEL_OUTOF10: NO PAIN (0)

## 2024-10-24 NOTE — PROGRESS NOTES
HPI:   Mr. Logan is a 74 year old male with a past medical history including HCM, metastatic prostate cancer, cirrhosis d/t chronic HCV infection. Presents to clinic for initial core appointment.    [facility] 8/20/24-8/21/24 for chest pain. Labs were notable for mildly elevated troponin in a non-ACS pattern. TTE showed HCM with asymmetric septal hypertrophy and EDGAR with Valsalva LVOT gradient >70 mmHg. Symptoms were felt to be attributable to microvascular ischemia and he was started on ranolazine and he was referred for follow up to establish care for HCM.     Today  He felt more SOB when he was on lasix. He stopped the lasix and was feeling much better off of it from the breathing perspective. He only used it for a few days. However, he is having very significant LE edema. He tries to keep them elevated. When he left the hospital in August the legs were much better. He feels like he is urinating less than he is taking in. He is doing very minimal exertion. He is most limited from the hip perspected. He gets some vertigo. No other lightheadedness. No chest pain. Maybe occasional palpitations- don't last long at all. Appitite is very good.     Doesn't have a scale at home that he can use- can't stand.     He saw his oncologist this morning and hte patient states that he is adding 50 mg of aldactone for diuresis, although the notes and orders don't reflect this yet. Patient would like to do this rather than a loop diuretic due to his     Cardiac Medications  ASA 81 mg daily  Valsartan 160 mg daily  Metoprolol succinate 150 mg daily  Aldactone - added this am by oncology, hasn't started it yet    PAST MEDICAL HISTORY:  Past Medical History:   Diagnosis Date    Arthritis     Calcium pyrophosphate deposition disease 08/08/2024    Chronic hepatitis C (H) 05/19/2008    Chronic, continuous use of opioids     Cirrhosis of liver (H) 06/18/2008    Class 2 severe obesity due to excess calories with serious comorbidity in adult  (H) 06/04/2024    Compression fracture of T5 vertebra with routine healing, subsequent encounter 02/28/2023    Compression fracture of T6 vertebra with routine healing 02/20/2023    Diabetes 1.5, 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    Diverticular disease of colon 07/14/2015    Esophageal reflux 03/01/2014    Gastroesophageal reflux disease, esophagitis presence not specified 10/06/2016    IMO Regulatory Load OCT 2020      Glaucoma suspect of both eyes 04/30/2024    Hearing problem     Hiatal hernia     Hyperlipidemia LDL goal <100 04/19/2017    Hypertension     Hypertension goal BP (blood pressure) < 140/90 02/08/2013    Intertrochanteric fracture of left femur, closed, initial encounter (H) 05/19/2024    Jaundice 05/31/2008    Liver disease     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 Not keen to use it.     Stage 3 chronic kidney disease, unspecified whether stage 3a or 3b CKD (H) 01/23/2024    Syncope, unspecified syncope type 02/20/2023    Thrombocytopenia (H) 08/28/2008    Overview:   8/28/2008, attributed to liver disease with portal hypertension and functional splenomegaly         FAMILY HISTORY:  Family History   Problem Relation Age of Onset    Alzheimer Disease Mother 85    Dementia Mother     Cerebrovascular Disease Father 50    Cancer Father     Hypertension Father     Neurologic Disorder No family hx of     Diabetes No family hx of        SOCIAL HISTORY:  Social History     Socioeconomic History    Marital status: Single    Number  of children: 0    Years of education: 18   Occupational History    Occupation: Contractor     Employer: [employer]     Comment: Not working now   Tobacco Use    Smoking status: Former     Current packs/day: 0.00     Types: Cigarettes     Start date: 1990     Quit date: 1999     Years since quittin.8     Passive exposure: Never    Smokeless tobacco: Never   Vaping Use    Vaping status: Never Used   Substance and Sexual Activity    Alcohol use: Yes     Comment: rare    Drug use: No    Sexual activity: Not Currently     Partners: Female   Other Topics Concern    Parent/sibling w/ CABG, MI or angioplasty before 65F 55M? No     Social Drivers of Health     Financial Resource Strain: Low Risk  (2024)    Received from [employer]    Financial Resource Strain     Difficulty of Paying Living Expenses: 3   Food Insecurity: No Food Insecurity (2024)    Received from [employer]    Food Insecurity     Do you worry your food will run out before you are able to buy more?: 1   Transportation Needs: No Transportation Needs (2024)    Received from [employer]    Transportation Needs     Lack of Transportation (Medical): 1   Social Connections: Socially Integrated (2024)    Received from [employer]    Social Connections     Frequency of Communication with Friends and Family: 0   Interpersonal Safety: Low Risk  (2024)    Interpersonal Safety     Do you feel physically and emotionally safe where you currently live?: Yes     Within the past 12 months, have you been hit, slapped, kicked or otherwise physically hurt by someone?: No     Within the past 12 months, have you been humiliated or emotionally abused in other ways by your partner or ex-partner?: No   Housing Stability: Low Risk  (2024)    Received from [employer]    Housing Stability      What is your housing situation today?: 1       CURRENT MEDICATIONS:  Current Outpatient Medications   Medication Sig Dispense Refill    acetaminophen (TYLENOL) 325 MG tablet Take 2 tablets (650 mg) by mouth every 4 hours as needed for other (For optimal non-opioid multimodal pain management to improve pain control.) 100 tablet 0    amoxicillin-clavulanate (AUGMENTIN) 875-125 MG tablet Take 1 tablet by mouth 2 times daily for 10 days. 20 tablet 0    aspirin 81 MG EC tablet Take 1 tablet (81 mg) by mouth daily 90 tablet 3    calcium citrate (CITRACAL) 950 (200 Ca) MG tablet Take 1 tablet (950 mg) by mouth daily 120 tablet 3    darolutamide (NUBEQA) 300 MG tablet Take 2 tablets (600 mg) by mouth 2 times daily for 21 days. . Swallow tablets whole. Take with food. Avoid grapefruit or grapefruit juice. 84 tablet 0    darolutamide (NUBEQA) 300 MG tablet Take 2 tablets (600 mg) by mouth 2 times daily for 21 days. . Swallow tablets whole. Take with food. Avoid grapefruit or grapefruit juice. 84 tablet 0    dexAMETHasone (DECADRON) 4 MG tablet Take 2 tablets (8 mg) by mouth 2 times daily (with meals). Start evening of Docetaxel infusion and continue for a total of 3 doses.  Repeat with each cycle of chemotherapy.      diclofenac (VOLTAREN) 1 % topical gel Apply 4 g topically 3 times daily as needed for moderate pain (bursitis) 150 g 1    metFORMIN (GLUCOPHAGE) 500 MG tablet Take 1 tablet (500 mg) by mouth 2 times daily (with meals). 180 tablet 1    methocarbamol (ROBAXIN) 500 MG tablet Take 1 Tablet (500 mg) by mouth every 6 hours if needed for Muscle Spasm PO 1st choice.      metoprolol succinate ER (TOPROL XL) 100 MG 24 hr tablet 1 1/2 ( 150 mg ) po daily 135 tablet 1    multivitamin w/minerals (THERA-VIT-M) tablet Take 1 tablet by mouth daily      omeprazole (PRILOSEC) 20 MG DR capsule TAKE 1 CAPSULE BY MOUTH ONCE DAILY 30 TO 60 MINUTES BEFORE A MEAL 90 capsule 1    ondansetron (ZOFRAN) 8 MG tablet Take 1 tablet (8 mg)  by mouth every 8 hours as needed for nausea 60 tablet 5    oxyCODONE (ROXICODONE) 5 MG tablet Take 1-2 tablets (5-10 mg) by mouth every 6 hours as needed for pain. 70 tablet 0    predniSONE (DELTASONE) 5 MG tablet Take 1 tablet (5 mg) by mouth daily (with breakfast) Do not take prednisone on days when taking dexamethasone. 60 tablet 1    prochlorperazine (COMPAZINE) 10 MG tablet Take 0.5 tablets (5 mg) by mouth every 6 hours as needed for nausea or vomiting 60 tablet 5    senna-docusate (SENOKOT-S/PERICOLACE) 8.6-50 MG tablet Take 1 tablet by mouth 2 times daily as needed for constipation 30 tablet 1    tiZANidine (ZANAFLEX) 4 MG tablet TAKE 1/2 (ONE-HALF) TO 1  TABLET BY MOUTH UP TO THREE TIMES DAILY AS NEEDED FOR MUSCLE PAIN 40 tablet 0    valsartan (DIOVAN) 160 MG tablet Take 1 tablet (160 mg) by mouth daily 90 tablet 1    vitamin D3 (CHOLECALCIFEROL) 50 mcg (2000 units) tablet Take 1 tablet (50 mcg) by mouth daily 120 tablet 3    valACYclovir (VALTREX) 1000 mg tablet Take 1 tablet (1,000 mg) by mouth 3 times daily for 7 days. 21 tablet 0     No current facility-administered medications for this visit.       ROS:   Refer to HPI    EXAM:  /66 (BP Location: Right arm, Patient Position: Chair, Cuff Size: Adult Regular)   Pulse 68   SpO2 99%   GENERAL: Appears comfortable, in no acute distress. In a wheelchair  HEENT: Eye symmetrical, no discharge or icterus bilaterally.   CV: RRR, +S1S2, very soft systolic murmur, rub, or gallop. JVP mid neck at 90 degrees.   RESPIRATORY: Respirations regular, even, and unlabored. Lungs CTA throughout.   GI: Soft and non distended   EXTREMITIES: 3+ peripheral edema. All extremities are warm and well perfused  NEUROLOGIC: Alert and interacting appropriately.   SKIN: No jaundice.     Labs, reviewed with patient in clinic today:  CBC RESULTS:  Lab Results   Component Value Date    WBC 7.2 10/24/2024    WBC 4.5 04/05/2021    RBC 2.46 (L) 10/24/2024    RBC 4.29 (L) 04/05/2021     HGB 8.2 (L) 10/24/2024    HGB 14.1 04/05/2021    HCT 25.7 (L) 10/24/2024    HCT 42.1 04/05/2021     (H) 10/24/2024    MCV 98 04/05/2021    MCH 33.3 (H) 10/24/2024    MCH 32.9 04/05/2021    MCHC 31.9 10/24/2024    MCHC 33.5 04/05/2021    RDW 22.6 (H) 10/24/2024    RDW 12.3 04/05/2021    PLT 75 (L) 10/24/2024    PLT 63 (L) 04/05/2021       CMP RESULTS:  Lab Results   Component Value Date     10/24/2024     04/05/2021    POTASSIUM 4.6 10/24/2024    POTASSIUM 4.1 01/18/2023    POTASSIUM 4.3 04/05/2021    CHLORIDE 113 (H) 10/24/2024    CHLORIDE 106 01/18/2023    CHLORIDE 106 04/05/2021    CO2 21 (L) 10/24/2024    CO2 27 01/18/2023    CO2 29 04/05/2021    ANIONGAP 6 (L) 10/24/2024    ANIONGAP 9 01/18/2023    ANIONGAP 2 (L) 04/05/2021     (H) 10/24/2024     (H) 08/20/2024     (H) 01/18/2023     (H) 04/05/2021    BUN 29.0 (H) 10/24/2024    BUN 21 01/18/2023    BUN 23 04/05/2021    CR 1.26 (H) 10/24/2024    CR 1.06 04/05/2021    GFRESTIMATED 60 (L) 10/24/2024    GFRESTIMATED 71 04/05/2021    GFRESTBLACK 82 04/05/2021    SOLEDAD 8.5 (L) 10/24/2024    SOLEDAD 9.3 04/05/2021    BILITOTAL 0.8 10/24/2024    BILITOTAL 1.5 (H) 04/05/2021    ALBUMIN 2.8 (L) 10/24/2024    ALBUMIN 3.4 01/18/2023    ALBUMIN 3.8 04/05/2021    ALKPHOS 148 10/24/2024    ALKPHOS 87 04/05/2021    ALT 21 10/24/2024    ALT 78 (H) 04/05/2021    AST 32 10/24/2024    AST 46 (H) 04/05/2021        INR RESULTS:  Lab Results   Component Value Date    INR 1.32 (H) 08/19/2024    INR 1.14 04/05/2021       Lab Results   Component Value Date    MAG 1.8 08/21/2024    MAG 2.2 08/10/2018     Lab Results   Component Value Date    NTBNPI 4,816 (H) 08/20/2024     Lab Results   Component Value Date    NTBNP 6,010 (H) 10/24/2024    NTBNP 67 02/20/2020       Diagnostics:  8/20/24 ECHO  Known hypertrophic cardiomyopathy with asymmetric septal hypertrophy  phenotype.  Global and regional left ventricular function is normal with an EF of  60-65%.  There is asymmetric septal hypertrophy. The maximal wall thickness is 2.6 cm  in the basal anteroseptal segment. There is a resting LVOT gradient of 19 mmHg  that increases to 72 mmHg with Valsalva.  Global right ventricular function is normal. The right ventricle is normal  size.  There is systolic anterior motion of the mitral valve without septal contact.  Mild mitral regurgitation.  IVC diameter <2.1 cm collapsing >50% with sniff suggests a normal RA pressure  of 3 mmHg.  This study was compared with the study from 12/04/2023. No significant changes  noted    Assessment and Plan:   Mr. Logan is a 74 year old male with a past medical history including HCM, metastatic prostate cancer, cirrhosis d/t chronic HCV infection. Presents to clinic for initial core appointment.    He stopped taking his lasix just a few days after it was recommended because he felt it made him more SOB and his pharmacist told him that could be a side effect. Since then, his leg edema has been getting significantly worse. I recommended a loop diuretic and monitoring for worsening symptoms of LVOTO, but he would prefer to start aldactone (Was prescribed this morning by oncology). I told him I would recommend being on both. He would like to try aldactone first. We will call him Monday and if he has not seen significant improvement, we will add a loop diuretic. Additionally, we discussed that we may need the hospital for diuresis as his picture is quite complicated with the HCM w/EDGAR and resting LVOTO and the very significant amount of fluid on exam. This will be further difficult given no scale or ability to stand on a scale.    # HCM with EDGAR with valsalve LVOTO gradient   - Avoid vasodilators  - Ranolazine was stopped d/t no improvement of his symptoms  - Dr. Gonsales office working on getting a myosin inhibitor for him as his symptoms   - Continue metoprolol succinate 150 mg daily  - On Valsartan 160 mg daily- tolerating  current dose, no changes today  - Volume statue: Hypervolemic- starting aldactone per oncologist as described above. Patient agreeable to adding a loop diuretic on Monday if not having good rate of diuresis  - Has been recommended to do genetic counseling previously  - F/up in HCM/Genetics clinic as previously recommended by Dr. Gonsales    # Metastatic prostate cancer   -Seeing oncology today    # Cirrhosis d/t chronic HCV infection  -Management per liver team    # CKD stage 2  - Cr up to 1.26 today, likely cardiorenal  - Diuretic management as above  - Will need bmp next week      Follow up   - RN phone call on Monday- add loop diuretic if not diuresing well and schedule labs  - HCM clinic in 2 weeks/first available      - I spent 22 minutes face to face with the patient and an additional 12 minutes coordinating care and completing documentation on the day of service        Sindy Tinsley PA-C  [facility]          CC  SINDY TINSLEY

## 2024-10-24 NOTE — PATIENT INSTRUCTIONS
Take your medicines every day, as directed     Changes made today:    - No medication changes    Monitor Your Weight and Symptoms     Contact us if you:     Gain 2 pounds in one day or 5 pounds in one week  Feel more short of breath  Notice more leg swelling  Feel lightheadeded    Change your lifestyle     Limit Salt or Sodium:  2000 mg  Limit Fluids:  2000 mL or approximately 64 ounces  Eat a Heart Healthy Diet  Low in saturated fats  Stay Active:  Aim to move at least 150 minutes every  week            To Contact us     During Business Hours:  [phone], option # 1      After hours, weekends or holidays:   [phone], Option #4  Ask to speak to the On-Call Cardiologist. Inform them you are a CORE/heart failure patient at the [facility].       [employer] allows you to communicate directly with your heart team through secure messaging.  [employer] can be accessed any time on your phone, computer, or tablet.  If you need assistance, we'd be happy to help!             Keep your Heart Appointments:     - RN phone call Monday- if you are not getting the fluid off well on spironolactone as well, we will add another diuretic    - We will get you set up in HCM clinic in 2-3 weeks      Please consider attending our virtual support group which is held monthly. Please reach out to Mata at [phone] for more information if you are interested in attending.      2024 dates:     Monday, November 4th, 1-2pm      Monday, December 2nd, 1-2pm

## 2024-10-24 NOTE — PROGRESS NOTES
Infusion Nursing Note:  Gucci Logan presents today for C4D1 Taxotere and Neulasta Onpro.    Patient seen by provider today: Yes: Dr. Plunkett   present during visit today: Not Applicable.    Note: Canelo presents to infusion today following his clinic appointment. Two staff members assisted him to the infusion recliner. Patient states he is taking dexamethasone as ordered.     TORB Dr. Plunkett/Keshia Guardado, RN:  -OK to proceed with elevated Cr and weight gain. Patient has edema from low albumin and Docetaxel    Intravenous Access:  Implanted Port.    Treatment Conditions:  Lab Results   Component Value Date    HGB 8.2 (L) 10/24/2024    WBC 7.2 10/24/2024    ANEU 2.1 08/21/2024    ANEUTAUTO 5.8 10/24/2024    PLT 75 (L) 10/24/2024     Lab Results   Component Value Date     10/24/2024    POTASSIUM 4.6 10/24/2024    MAG 1.8 08/21/2024    CR 1.26 (H) 10/24/2024    SOLEDAD 8.5 (L) 10/24/2024    BILITOTAL 0.8 10/24/2024    ALBUMIN 2.8 (L) 10/24/2024    ALT 21 10/24/2024    AST 32 10/24/2024     Results reviewed, labs MET treatment parameters, ok to proceed with treatment.    Post Infusion Assessment:  Patient tolerated infusion without incident.  Blood return noted pre and post infusion.  Site patent and intact, free from redness, edema or discomfort.  No evidence of extravasations.  Access discontinued per protocol.     Neulasta Onpro On-Body injector applied to RLQ abdomen at 1107.  Writer discussed Neulasta injection would start tomorrow 10/25 at 1410, approximately 27 hours after application applied today.    Written and Verbal instruction reviewed with patient.  Pt instructed when the dose delivery starts, it will take about 45 minutes to complete.  Pt aware Neulasta Onpro On-Body should have green flashing light and to call triage or on-call MD if injector flashes red or appears to be leaking.   Pt aware to keep Onpro On-Body Neulasta 4 inches away from electrical equipment and to avoid showering 4  hours prior to injection.   Neulasta Onpro Lot number: [id number]      Discharge Plan:   Patient declined prescription refills.  Discharge instructions reviewed with: Patient.  Patient and/or family verbalized understanding of discharge instructions and all questions answered.  Copy of AVS reviewed with patient and/or family.  Patient will return 11/14 for next appointment.  Patient discharged in stable condition accompanied by: self.  Departure Mode: Ambulatory.      Keshia Guardado RN

## 2024-10-24 NOTE — NURSING NOTE
"Oncology Rooming Note    October 24, 2024 7:05 AM   Gucci Logan is a 74 year old male who presents for:    Chief Complaint   Patient presents with    Port Draw     Labs drawn via port by RN in lab.  VS taken    Oncology Clinic Visit     prostate cancer     Initial Vitals: /51   Pulse 74   Temp 97.8  F (36.6  C) (Oral)   Resp 16   SpO2 97%  Estimated body mass index is 36.42 kg/m  as calculated from the following:    Height as of 9/23/24: 1.778 m (5' 10\").    Weight as of 10/3/24: 115.1 kg (253 lb 12.8 oz). There is no height or weight on file to calculate BSA.  Moderate Pain (5) Comment: Data Unavailable   No LMP for male patient.  Allergies reviewed: Yes  Medications reviewed: Yes    Medications: Medication refills not needed today.  Pharmacy name entered into EPIC:    [id number] - [place], [place] - [address] [facility]  [facility] - [place], [place] - [address] [place] [address] [address] [place] [address] [address] [facility]  [facility] - [place], [place] - [address] [facility] PHARMACY [place], [place] - [address] [facility] [phone]    Frailty Screening:   Is the patient here for a new oncology consult visit in cancer care? 2. No      Clinical concerns: none      Beth Hope, EMT  10/24/2024            "
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
## 2024-10-24 NOTE — NURSING NOTE
Chief Complaint   Patient presents with    New Patient     New CORE; 74 year old with HCM here for initial visit with labs prior       Vitals were taken, medications reconciled.    Trung Gorman, Clinic Assistant   7:54 AM

## 2024-10-24 NOTE — LETTER
10/24/2024      RE: Gucci Logan  [address] [facility] [address]       Dear Colleague,    Thank you for the opportunity to participate in the care of your patient, Gucci Logan, at the [facility] [place] at [facility]. Please see a copy of my visit note below.    HPI:   Mr. Logan is a 74 year old male with a past medical history including HCM, metastatic prostate cancer, cirrhosis d/t chronic HCV infection. Presents to clinic for initial core appointment.    [employer] 8/20/24-8/21/24 for chest pain. Labs were notable for mildly elevated troponin in a non-ACS pattern. TTE showed HCM with asymmetric septal hypertrophy and EDGAR with Valsalva LVOT gradient >70 mmHg. Symptoms were felt to be attributable to microvascular ischemia and he was started on ranolazine and he was referred for follow up to establish care for HCM.     Today  He felt more SOB when he was on lasix. He stopped the lasix and was feeling much better off of it from the breathing perspective. He only used it for a few days. However, he is having very significant LE edema. He tries to keep them elevated. When he left the hospital in August the legs were much better. He feels like he is urinating less than he is taking in. He is doing very minimal exertion. He is most limited from the hip perspected. He gets some vertigo. No other lightheadedness. No chest pain. Maybe occasional palpitations- don't last long at all. Appitite is very good.     Doesn't have a scale at home that he can use- can't stand.     He saw his oncologist this morning and hte patient states that he is adding 50 mg of aldactone for diuresis, although the notes and orders don't reflect this yet. Patient would like to do this rather than a loop diuretic due to his     Cardiac Medications  ASA 81 mg daily  Valsartan 160 mg daily  Metoprolol succinate 150 mg daily  Aldactone - added this am by oncology,  hasn't started it yet    PAST MEDICAL HISTORY:  Past Medical History:   Diagnosis Date     Arthritis      Calcium pyrophosphate deposition disease 08/08/2024     Chronic hepatitis C (H) 05/19/2008     Chronic, continuous use of opioids      Cirrhosis of liver (H) 06/18/2008     Class 2 severe obesity due to excess calories with serious comorbidity in adult (H) 06/04/2024     Compression fracture of T5 vertebra with routine healing, subsequent encounter 02/28/2023     Compression fracture of T6 vertebra with routine healing 02/20/2023     Diabetes 1.5, 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     Diverticular disease of colon 07/14/2015     Esophageal reflux 03/01/2014     Gastroesophageal reflux disease, esophagitis presence not specified 10/06/2016    IMO Regulatory Load OCT 2020       Glaucoma suspect of both eyes 04/30/2024     Hearing problem      Hiatal hernia      Hyperlipidemia LDL goal <100 04/19/2017     Hypertension      Hypertension goal BP (blood pressure) < 140/90 02/08/2013     Intertrochanteric fracture of left femur, closed, initial encounter (H) 05/19/2024     Jaundice 05/31/2008     Liver disease      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 Not keen to use it.      Stage 3 chronic kidney disease, unspecified whether stage 3a or 3b CKD (H) 01/23/2024     Syncope, unspecified syncope type 02/20/2023     Thrombocytopenia (H) 08/28/2008    Overview:   8/28/2008, attributed to liver disease with portal hypertension and  functional splenomegaly         FAMILY HISTORY:  Family History   Problem Relation Age of Onset     Alzheimer Disease Mother 85     Dementia Mother      Cerebrovascular Disease Father 50     Cancer Father      Hypertension Father      Neurologic Disorder No family hx of      Diabetes No family hx of        SOCIAL HISTORY:  Social History     Socioeconomic History     Marital status: Single     Number of children: 0     Years of education: 18   Occupational History     Occupation: Contractor     Employer: [employer]     Comment: Not working now   Tobacco Use     Smoking status: Former     Current packs/day: 0.00     Types: Cigarettes     Start date: 1990     Quit date: 1999     Years since quittin.8     Passive exposure: Never     Smokeless tobacco: Never   Vaping Use     Vaping status: Never Used   Substance and Sexual Activity     Alcohol use: Yes     Comment: rare     Drug use: No     Sexual activity: Not Currently     Partners: Female   Other Topics Concern     Parent/sibling w/ CABG, MI or angioplasty before 65F 55M? No     Social Drivers of Health     Financial Resource Strain: Low Risk  (2024)    Received from [employer]    Financial Resource Strain      Difficulty of Paying Living Expenses: 3   Food Insecurity: No Food Insecurity (2024)    Received from [employer]    Food Insecurity      Do you worry your food will run out before you are able to buy more?: 1   Transportation Needs: No Transportation Needs (2024)    Received from [employer]    Transportation Needs      Lack of Transportation (Medical): 1   Social Connections: Socially Integrated (2024)    [facility]    Social Connections      Frequency of Communication with Friends and Family: 0   Interpersonal Safety: Low Risk  (2024)    Interpersonal Safety      Do you feel  physically and emotionally safe where you currently live?: Yes      Within the past 12 months, have you been hit, slapped, kicked or otherwise physically hurt by someone?: No      Within the past 12 months, have you been humiliated or emotionally abused in other ways by your partner or ex-partner?: No   Housing Stability: Low Risk  (8/6/2024)    Received from [facility]    Housing Stability      What is your housing situation today?: 1       CURRENT MEDICATIONS:  Current Outpatient Medications   Medication Sig Dispense Refill     acetaminophen (TYLENOL) 325 MG tablet Take 2 tablets (650 mg) by mouth every 4 hours as needed for other (For optimal non-opioid multimodal pain management to improve pain control.) 100 tablet 0     amoxicillin-clavulanate (AUGMENTIN) 875-125 MG tablet Take 1 tablet by mouth 2 times daily for 10 days. 20 tablet 0     aspirin 81 MG EC tablet Take 1 tablet (81 mg) by mouth daily 90 tablet 3     calcium citrate (CITRACAL) 950 (200 Ca) MG tablet Take 1 tablet (950 mg) by mouth daily 120 tablet 3     darolutamide (NUBEQA) 300 MG tablet Take 2 tablets (600 mg) by mouth 2 times daily for 21 days. . Swallow tablets whole. Take with food. Avoid grapefruit or grapefruit juice. 84 tablet 0     darolutamide (NUBEQA) 300 MG tablet Take 2 tablets (600 mg) by mouth 2 times daily for 21 days. . Swallow tablets whole. Take with food. Avoid grapefruit or grapefruit juice. 84 tablet 0     dexAMETHasone (DECADRON) 4 MG tablet Take 2 tablets (8 mg) by mouth 2 times daily (with meals). Start evening of Docetaxel infusion and continue for a total of 3 doses.  Repeat with each cycle of chemotherapy.       diclofenac (VOLTAREN) 1 % topical gel Apply 4 g topically 3 times daily as needed for moderate pain (bursitis) 150 g 1     metFORMIN (GLUCOPHAGE) 500 MG tablet Take 1 tablet (500 mg) by mouth 2 times daily (with meals). 180 tablet 1     methocarbamol (ROBAXIN) 500 MG tablet Take  1 Tablet (500 mg) by mouth every 6 hours if needed for Muscle Spasm PO 1st choice.       metoprolol succinate ER (TOPROL XL) 100 MG 24 hr tablet 1 1/2 ( 150 mg ) po daily 135 tablet 1     multivitamin w/minerals (THERA-VIT-M) tablet Take 1 tablet by mouth daily       omeprazole (PRILOSEC) 20 MG DR capsule TAKE 1 CAPSULE BY MOUTH ONCE DAILY 30 TO 60 MINUTES BEFORE A MEAL 90 capsule 1     ondansetron (ZOFRAN) 8 MG tablet Take 1 tablet (8 mg) by mouth every 8 hours as needed for nausea 60 tablet 5     oxyCODONE (ROXICODONE) 5 MG tablet Take 1-2 tablets (5-10 mg) by mouth every 6 hours as needed for pain. 70 tablet 0     predniSONE (DELTASONE) 5 MG tablet Take 1 tablet (5 mg) by mouth daily (with breakfast) Do not take prednisone on days when taking dexamethasone. 60 tablet 1     prochlorperazine (COMPAZINE) 10 MG tablet Take 0.5 tablets (5 mg) by mouth every 6 hours as needed for nausea or vomiting 60 tablet 5     senna-docusate (SENOKOT-S/PERICOLACE) 8.6-50 MG tablet Take 1 tablet by mouth 2 times daily as needed for constipation 30 tablet 1     tiZANidine (ZANAFLEX) 4 MG tablet TAKE 1/2 (ONE-HALF) TO 1  TABLET BY MOUTH UP TO THREE TIMES DAILY AS NEEDED FOR MUSCLE PAIN 40 tablet 0     valsartan (DIOVAN) 160 MG tablet Take 1 tablet (160 mg) by mouth daily 90 tablet 1     vitamin D3 (CHOLECALCIFEROL) 50 mcg (2000 units) tablet Take 1 tablet (50 mcg) by mouth daily 120 tablet 3     valACYclovir (VALTREX) 1000 mg tablet Take 1 tablet (1,000 mg) by mouth 3 times daily for 7 days. 21 tablet 0     No current facility-administered medications for this visit.       ROS:   Refer to HPI    EXAM:  /66 (BP Location: Right arm, Patient Position: Chair, Cuff Size: Adult Regular)   Pulse 68   SpO2 99%   GENERAL: Appears comfortable, in no acute distress. In a wheelchair  HEENT: Eye symmetrical, no discharge or icterus bilaterally.   CV: RRR, +S1S2, very soft systolic murmur, rub, or gallop. JVP mid neck at 90 degrees.    RESPIRATORY: Respirations regular, even, and unlabored. Lungs CTA throughout.   GI: Soft and non distended   EXTREMITIES: 3+ peripheral edema. All extremities are warm and well perfused  NEUROLOGIC: Alert and interacting appropriately.   SKIN: No jaundice.     Labs, reviewed with patient in clinic today:  CBC RESULTS:  Lab Results   Component Value Date    WBC 7.2 10/24/2024    WBC 4.5 04/05/2021    RBC 2.46 (L) 10/24/2024    RBC 4.29 (L) 04/05/2021    HGB 8.2 (L) 10/24/2024    HGB 14.1 04/05/2021    HCT 25.7 (L) 10/24/2024    HCT 42.1 04/05/2021     (H) 10/24/2024    MCV 98 04/05/2021    MCH 33.3 (H) 10/24/2024    MCH 32.9 04/05/2021    MCHC 31.9 10/24/2024    MCHC 33.5 04/05/2021    RDW 22.6 (H) 10/24/2024    RDW 12.3 04/05/2021    PLT 75 (L) 10/24/2024    PLT 63 (L) 04/05/2021       CMP RESULTS:  Lab Results   Component Value Date     10/24/2024     04/05/2021    POTASSIUM 4.6 10/24/2024    POTASSIUM 4.1 01/18/2023    POTASSIUM 4.3 04/05/2021    CHLORIDE 113 (H) 10/24/2024    CHLORIDE 106 01/18/2023    CHLORIDE 106 04/05/2021    CO2 21 (L) 10/24/2024    CO2 27 01/18/2023    CO2 29 04/05/2021    ANIONGAP 6 (L) 10/24/2024    ANIONGAP 9 01/18/2023    ANIONGAP 2 (L) 04/05/2021     (H) 10/24/2024     (H) 08/20/2024     (H) 01/18/2023     (H) 04/05/2021    BUN 29.0 (H) 10/24/2024    BUN 21 01/18/2023    BUN 23 04/05/2021    CR 1.26 (H) 10/24/2024    CR 1.06 04/05/2021    GFRESTIMATED 60 (L) 10/24/2024    GFRESTIMATED 71 04/05/2021    GFRESTBLACK 82 04/05/2021    SOLEDAD 8.5 (L) 10/24/2024    SOLEDAD 9.3 04/05/2021    BILITOTAL 0.8 10/24/2024    BILITOTAL 1.5 (H) 04/05/2021    ALBUMIN 2.8 (L) 10/24/2024    ALBUMIN 3.4 01/18/2023    ALBUMIN 3.8 04/05/2021    ALKPHOS 148 10/24/2024    ALKPHOS 87 04/05/2021    ALT 21 10/24/2024    ALT 78 (H) 04/05/2021    AST 32 10/24/2024    AST 46 (H) 04/05/2021        INR RESULTS:  Lab Results   Component Value Date    INR 1.32 (H) 08/19/2024     INR 1.14 04/05/2021       Lab Results   Component Value Date    MAG 1.8 08/21/2024    MAG 2.2 08/10/2018     Lab Results   Component Value Date    NTBNPI 4,816 (H) 08/20/2024     Lab Results   Component Value Date    NTBNP 6,010 (H) 10/24/2024    NTBNP 67 02/20/2020       Diagnostics:  8/20/24 ECHO  Known hypertrophic cardiomyopathy with asymmetric septal hypertrophy  phenotype.  Global and regional left ventricular function is normal with an EF of 60-65%.  There is asymmetric septal hypertrophy. The maximal wall thickness is 2.6 cm  in the basal anteroseptal segment. There is a resting LVOT gradient of 19 mmHg  that increases to 72 mmHg with Valsalva.  Global right ventricular function is normal. The right ventricle is normal  size.  There is systolic anterior motion of the mitral valve without septal contact.  Mild mitral regurgitation.  IVC diameter <2.1 cm collapsing >50% with sniff suggests a normal RA pressure  of 3 mmHg.  This study was compared with the study from 12/04/2023. No significant changes  noted    Assessment and Plan:   Mr. Logan is a 74 year old male with a past medical history including HCM, metastatic prostate cancer, cirrhosis d/t chronic HCV infection. Presents to clinic for initial core appointment.    He stopped taking his lasix just a few days after it was recommended because he felt it made him more SOB and his pharmacist told him that could be a side effect. Since then, his leg edema has been getting significantly worse. I recommended a loop diuretic and monitoring for worsening symptoms of LVOTO, but he would prefer to start aldactone (Was prescribed this morning by oncology). I told him I would recommend being on both. He would like to try aldactone first. We will call him Monday and if he has not seen significant improvement, we will add a loop diuretic. Additionally, we discussed that we may need the hospital for diuresis as his picture is quite complicated with the HCM w/EDGAR and  resting LVOTO and the very significant amount of fluid on exam. This will be further difficult given no scale or ability to stand on a scale.    # HCM with EDGAR with valsalve LVOTO gradient   - Avoid vasodilators  - Ranolazine was stopped d/t no improvement of his symptoms  - Dr. Gonsales office working on getting a myosin inhibitor for him as his symptoms   - Continue metoprolol succinate 150 mg daily  - On Valsartan 160 mg daily- tolerating current dose, no changes today  - Volume statue: Hypervolemic- starting aldactone per oncologist as described above. Patient agreeable to adding a loop diuretic on Monday if not having good rate of diuresis  - Has been recommended to do genetic counseling previously  - F/up in HCM/Genetics clinic as previously recommended by Dr. Gonsales    # Metastatic prostate cancer   -Seeing oncology today    # Cirrhosis d/t chronic HCV infection  -Management per liver team    # CKD stage 2  - Cr up to 1.26 today, likely cardiorenal  - Diuretic management as above  - Will need bmp next week      Follow up   - RN phone call on Monday- add loop diuretic if not diuresing well and schedule labs  - HCM clinic in 2 weeks/first available      - I spent 22 minutes face to face with the patient and an additional 12 minutes coordinating care and completing documentation on the day of service        Sindy Tinsley PA-C  [facility] Cardiology          CC  SINDY TINSLEY      Please do not hesitate to contact me if you have any questions/concerns.     Sincerely,     Sindy Tinsley PA-C

## 2024-10-25 ENCOUNTER — TELEPHONE (OUTPATIENT)
Dept: CARDIOLOGY | Facility: CLINIC | Age: 74
End: 2024-10-25

## 2024-10-25 ENCOUNTER — NURSE TRIAGE (OUTPATIENT)
Dept: ONCOLOGY | Facility: CLINIC | Age: 74
End: 2024-10-25
Payer: COMMERCIAL

## 2024-10-25 ENCOUNTER — INFUSION THERAPY VISIT (OUTPATIENT)
Dept: ONCOLOGY | Facility: CLINIC | Age: 74
End: 2024-10-25
Attending: INTERNAL MEDICINE
Payer: COMMERCIAL

## 2024-10-25 ENCOUNTER — MEDICAL CORRESPONDENCE (OUTPATIENT)
Dept: HEALTH INFORMATION MANAGEMENT | Facility: CLINIC | Age: 74
End: 2024-10-25

## 2024-10-25 VITALS
RESPIRATION RATE: 16 BRPM | HEART RATE: 73 BPM | SYSTOLIC BLOOD PRESSURE: 123 MMHG | DIASTOLIC BLOOD PRESSURE: 66 MMHG | OXYGEN SATURATION: 99 % | TEMPERATURE: 98 F

## 2024-10-25 DIAGNOSIS — C61 PROSTATE CANCER (H): ICD-10-CM

## 2024-10-25 DIAGNOSIS — Z51.11 ENCOUNTER FOR ANTINEOPLASTIC CHEMOTHERAPY: Primary | ICD-10-CM

## 2024-10-25 DIAGNOSIS — I50.32 CHRONIC DIASTOLIC HEART FAILURE (H): Primary | ICD-10-CM

## 2024-10-25 DIAGNOSIS — C77.8 MALIGNANT NEOPLASM METASTATIC TO LYMPH NODES OF MULTIPLE SITES (H): ICD-10-CM

## 2024-10-25 DIAGNOSIS — C79.51 METASTASIS TO BONE (H): ICD-10-CM

## 2024-10-25 PROCEDURE — 250N000011 HC RX IP 250 OP 636: Mod: JZ | Performed by: INTERNAL MEDICINE

## 2024-10-25 PROCEDURE — 96372 THER/PROPH/DIAG INJ SC/IM: CPT | Performed by: INTERNAL MEDICINE

## 2024-10-25 RX ORDER — BUMETANIDE 2 MG/1
2 TABLET ORAL DAILY
Qty: 30 TABLET | Refills: 0 | Status: ON HOLD | OUTPATIENT
Start: 2024-10-25

## 2024-10-25 RX ADMIN — PEGFILGRASTIM 6 MG: 6 INJECTION SUBCUTANEOUS at 11:39

## 2024-10-25 NOTE — PROGRESS NOTES
Infusion Nursing Note:  Gucci Logan presents today for Neulasta (Taxotere done on 10/24 at 1103).    Patient seen by provider today: No   present during visit today: Not Applicable.    Note: Patient presents to infusion feeling ok. Patient denies new acute discomfort and states no acute complaints or concerns needing to be addressed today. Specifically, pt denies s/s of infection such as fever, sore throat, cough, chest pain, shortness of breath, body aches, chills, headache, increased nasal congestion, or changes in taste/smell. Pt states Neulasta device fell off (was to initiate at 1410 per infusion nurse note yesterday) and ever since then, insertion site has been draining serous drainage. Pt was seen by Cardiology yesterday with recent intervention of taking 100mg vs 50mg of Spiralactone. Pt is aware to go to ER if shortness of breath, dizziness, chest pain, lightheadedness, or feeling faint occurs at home.        Intravenous Access:  No Intravenous access/labs at this visit.    Treatment Conditions:  Not Applicable.      Post Infusion Assessment:  Patient tolerated 1 subcutaneous  injection via back of right arm without incident.       Discharge Plan:   Patient declined prescription refills.  Discharge instructions reviewed with: Patient.  Patient and/or family verbalized understanding of discharge instructions and all questions answered.  AVS to patient via [employer].  Patient will return 11/14 for next appointment.   Patient discharged in stable condition accompanied by: RN.  Departure Mode: Wheelchair.      Shamar Morris RN

## 2024-10-25 NOTE — TELEPHONE ENCOUNTER
"Oncology Nurse Triage - Reporting Symptoms    Situation:   Gucci reporting the following symptoms: weeping from stomach after neulasta onpro was removed. [facility]     Background:   Treating Provider:   Dr. Plunkett     Date of last office visit: 10/24/24    Recent treatments: Yes: 10/24/24 C4D1 Taxotere and Neulasta Onpro     Assessment  Put on \"water weight recently\" in abdomen. Had Neulasta placed on stomach and must have pulled loose while asleep and Neulasta was not secured, so he pulled it off. Area was dry when he woke up, but now is weeping from where Neulasta was inserted. He worries he did not get full dose of Neulasta. Denies redness to area.   Neulasta is still flashing green, but container is empty     Back of left leg swollen- in crack of left knee feels moist. Pt is in a wheel chair and cannot see area.  Started Spironolactone last night as directed by provider.      Recommendations:   Pt states he has a ride this AM, if he needs to come in it would be ideal to come this AM. Writer reviewed extra fluid is likely what is weeping from abdomen and leg- advised gauze pads if he has them, as if he uses band aids he would likely have to change frequently. Informed will need to talk to provider about Neulasta and call him back with recommendations.   0842 page to Dr. Plunkett   0903 return call from Dr. Plunkett, who states drainage is tissues drainage, reviewed from notes Neulasta is supposed to inject today at 1410, recommends we bring pt back in to have Neulasta injection.   0908 call to [facility], spoke w/ Yaron, who states the earliest pt can have injection would be 11am.   Call to pt, reviewed above information, pt states he will call his ride and will be at 11am appt. Pt states he saw cardiology yesterday as well and they suggested doubling spironolactone to 100mg/day over weekend and they would call Monday to assess, if ineffective would offer an alternative " "diuretic. Pt wondering if Dr. Plunkett is okay with this plan?   0920 message sent to shirley. Heidi, infusion charge updated. Also asked if infusion could give pt gauze pads for drainage.   0932 call to pt, reviewed per Dr. Plunkett, okay to increase Spironolactone to 100mg/day for over weekend, hopes it will dry him up. Pt states area is draining pretty good. He plans to change his shirt before he comes to infusion. He states he did not get to see [facility] yesterday d/t other appts, asked if there was a spray that \"would seal, but help with healing and allow to breathe\" for his leg. Writer advised to keep leg and stomach clean and dry as possible, advised not applying anything that was not prescribed or recommended to him, reviewed signs of infection to monitor for.   "

## 2024-10-25 NOTE — PATIENT INSTRUCTIONS
[facility] Triage and after hours / weekends / holidays:  [phone]    Please call the triage or after hours line if you experience a temperature greater than or equal to 100.4, shaking chills, have uncontrolled nausea, vomiting and/or diarrhea, dizziness, shortness of breath, chest pain, bleeding, unexplained bruising, or if you have any other new/concerning symptoms, questions or concerns.      If you are having any concerning symptoms or wish to speak to a provider before your next infusion visit, please call triage to notify them so we can adequately serve you.     If you need a refill on a narcotic prescription or other medication, please call before your infusion appointment.                 October 2024 Sunday Monday Tuesday Wednesday Thursday Friday Saturday             1     2     3    LAB CENTRAL   6:45 AM   (15 min.)   [facility] LAB DRAW   [facility]    RETURN CCSL   7:00 AM   (45 min.)   Piedad Ji PA-C   [facility]    ONC INFUSION 1.5 HR (90 MIN)   8:00 AM   (90 min.)    ONC INFUSION NURSE   [facility] 4     5       6     7     8     9     10     11     12       13     14    OFFICE VISIT  10:40 AM   (20 min.)   Richi Jaramillo MD   [facility] 15     16     17     18    PET PSMA EYES TO THIGHS  12:00 PM   (30 min.)   [email or web address]   [facility]    XR GENERAL XRAY   2:30 PM   (20 min.)   [email or web address]   [facility] 19       20     21     22     23    OFFICE VISIT  12:20 PM   (20 min.)   Richi [facility]illo [facility]   [facility]    LAB CENTRAL   6:30 AM   (15 min.)   [facility] LAB DRAW   [facility]    RETURN CCSL   6:45 AM   (30 min.)   Souleymane Plunkett MD   [facility]    ENROLLMENT CORE   7:25 AM   (40 min.)   Sindy Castañeda PA-C   [facility]    ONC  INFUSION 1.5 HR (90 MIN)   8:00 AM   (90 min.)    ONC INFUSION NURSE   [facility] 25    ONC INFUSION 0.5 HR (30 MIN)  11:00 AM   (30 min.)    ONC INFUSION NURSE   [facility] 26       27     28     29     30     31 November 2024 Sunday Monday Tuesday Wednesday Thursday Friday Saturday                            1     2       3     4     5     6     7     8     9       10     11     12     13     14    NEW WOUND NURSE   9:15 AM   (60 min.)   Viktoriya Beverly, RN   [facility] [place]    LAB CENTRAL  11:00 AM   (15 min.)   [facility] LAB DRAW   [facility]    RETURN CCSL  11:30 AM   (45 min.)   Piedad Ji PA-C   [facility]    ONC INFUSION 1.5 HR (90 MIN)   1:30 PM   (90 min.)    ONC INFUSION NURSE   [facility] 15     16       17     18     19     20     21     22     23       24     25     26     27     28     29     30                     Lab Results:  No results found for this or any previous visit (from the past 12 hours).

## 2024-10-25 NOTE — TELEPHONE ENCOUNTER
Called Gucci back to review Mirna's recommendations.       If things are too much to manage, that is very understable. I do think we will end up needing the hospital to get this fluid off. This is already a very high risk diuretic situation d/t his hcm with LVOTO physilogy. Adding on the aldactone at high levels as a new restart adds a level of risk as well. He is at risk of sudden cardiac death from electrolyte abnormalities or HCM LVOTO. Very understandable if he can't do this much outpatient- that would be normal. However, for safety reasons, I can't change my lab recommendations.     I do not recommend the spironolactone increase.     Reviewed all this with Canelo. He doesn't want to go to the ER yet, but asked about a direct admission. Discussed that I will review this with Mirna. In the meantime if things worsen over the weekend advised to go to the ER. He is agreeable to start the bumex 2 mg daily and will keep the spironolactone at 50 mg daily. I advised we cannot change our recommendation for safety reasons on labs on Monday, but the earliest he would agree to get labs was Wednesday.

## 2024-10-25 NOTE — TELEPHONE ENCOUNTER
[facility] Call Center    Phone Message    May a detailed message be left on voicemail: yes     Reason for Call: Other: Pt returning a call to nurse, pt cannot recall her name but they were talking about his diuretic. Pt is asking for this to please be sent over to [facility] today if at all possible. Thank you!     Action Taken: Message routed to:  [facility] ([facility]OLOG[facility] [[id number]]    Travel Screening: Not Applicable     Date of Service:

## 2024-10-25 NOTE — RESULT ENCOUNTER NOTE
Hemoglobin ( red blood count ) and iron level quite low.  I believe you saw oncology and cardiology yesterday.    Richi Jaramillo MD    Please mail letter and results to patient   Thanks  Richi Jaramillo MD

## 2024-10-25 NOTE — TELEPHONE ENCOUNTER
"Reviewed with Mirna Castañeda. Called Canelo back with the following recommendations:  Date: 10/25/2024    Time of Call: 2:36 PM     Diagnosis:  HCM     [ TORB ] Ordering provider: Mirna WOMACK  Order:   I feel strongly that he needs a loop diuretic. I would strongly prefer he adds bumex 2 mg PO daily rather than increase the aldactone. I did NOT suggest doubling the aldactone. He should get a BMP on Monday and I do not recommend going to 100 mg at this time.        Order received by: Katie Marshall RN      Follow-up/additional notes:     he said there was a a malfunction where the infusion and medicine was dripping out of the infusor. He also advised that he doesn't drive and \"when you start changing meds, I don't know what you expect. I don't know if this is going to work. This is what I was afraid of. Constant labs, constant visits. If you're going to be constantly changing things I don't know if this is going to work for me. She's going to have to take what she can get. Order the labs and whenever I'm there for infusion they can take them at that point.\"    Advised I'm going to talk to Mirna before prescribing the bumex then as if he can't get follow up labs we need to figure out how to safely diurese him. Discussed that I know that it's hard, but when we are trying to get rid of this much fluid, it requires frequent medication changes and labs. If this isn't possible, the other option to safely diurese is to go to the ER. He reports he declines to go to the [facility] as he will wait forever in the ER.   He then said maybe he can go to [place] Tuesday or Wednesday for labs.  He did tell me he already took the [place] 100 mg today. He thinks he could to [facility] sometime this weekend to  the Bumex.   Will review with provider before sending.        "

## 2024-10-28 ENCOUNTER — PATIENT OUTREACH (OUTPATIENT)
Dept: CARDIOLOGY | Facility: CLINIC | Age: 74
End: 2024-10-28
Payer: COMMERCIAL

## 2024-10-28 NOTE — PROGRESS NOTES
Called Canelo again to check in. He has been taking the bumex starting Friday night. His urine is a lot lighter in color. Doesn't feel like its a lot more than normal. His legs - still has some weeping coming out from where he had the chemo injection and weeping on the other side since he bumped his leg on something. He thinks overall there is no change in the swelling since we saw him last week.   We discussed the plan, and he thinks it would be a good idea to come in for direct admission based on our conversation Friday (limited mobility, difficulty getting in for frequent labs needed for the amount of diuresis he needs, unable to weigh, risks of diuresis with HCM)  His preference is to not do admission today as it would be difficult with getting  and ride.   He asked about a medication he saw an ad for on TV for patients with HCM. He thinks Dr. Gonsales also discussed it. Can't find documentation of what this medication would be.

## 2024-10-28 NOTE — PROGRESS NOTES
Called Canelo to check in, Friday had agreed to starting Bumex 2 mg daily and keeping Spironolactone at 50 mg daily.   Got voicemail, mailbox was full and couldn't leave message. Will try back later.

## 2024-10-28 NOTE — PROGRESS NOTES
Reviewed with Sindy Castañeda who spoke with Cards 1 attending Dr. Yepez. Willing to accept as a direct admission. Bed request order placed since patient wants to plan on tomorrow. Requested a bed fr 2 pm and updated patient. Advised to clean out his voicemail box, and if anything changes on bed timing he will be called, otherwise plan to show up at 2pm Tuesday.

## 2024-10-29 ENCOUNTER — PATIENT OUTREACH (OUTPATIENT)
Dept: ONCOLOGY | Facility: CLINIC | Age: 74
End: 2024-10-29
Payer: COMMERCIAL

## 2024-10-29 ENCOUNTER — HOSPITAL ENCOUNTER (INPATIENT)
Facility: CLINIC | Age: 74
LOS: 18 days | Discharge: NURSING, REHAB OR LONG-TERM CARE | End: 2024-11-16
Attending: INTERNAL MEDICINE | Admitting: INTERNAL MEDICINE
Payer: COMMERCIAL

## 2024-10-29 DIAGNOSIS — I10 HYPERTENSION GOAL BP (BLOOD PRESSURE) < 140/90: ICD-10-CM

## 2024-10-29 DIAGNOSIS — I42.1 HOCM (HYPERTROPHIC OBSTRUCTIVE CARDIOMYOPATHY) (H): ICD-10-CM

## 2024-10-29 DIAGNOSIS — I50.32 CHRONIC DIASTOLIC HEART FAILURE (H): ICD-10-CM

## 2024-10-29 DIAGNOSIS — E55.9 VITAMIN D DEFICIENCY: ICD-10-CM

## 2024-10-29 DIAGNOSIS — T14.8XXA OPEN WOUND: ICD-10-CM

## 2024-10-29 DIAGNOSIS — B02.9 HERPES ZOSTER WITHOUT COMPLICATION: ICD-10-CM

## 2024-10-29 DIAGNOSIS — C61 PROSTATE CANCER (H): ICD-10-CM

## 2024-10-29 DIAGNOSIS — E78.5 HYPERLIPIDEMIA LDL GOAL <100: ICD-10-CM

## 2024-10-29 DIAGNOSIS — R18.8 CIRRHOSIS OF LIVER WITH ASCITES, UNSPECIFIED HEPATIC CIRRHOSIS TYPE (H): ICD-10-CM

## 2024-10-29 DIAGNOSIS — D69.6 THROMBOCYTOPENIA (H): ICD-10-CM

## 2024-10-29 DIAGNOSIS — G47.33 OSA (OBSTRUCTIVE SLEEP APNEA): Chronic | ICD-10-CM
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
## 2024-10-29 DIAGNOSIS — I50.9 HEART FAILURE (H): Primary | ICD-10-CM

## 2024-10-29 DIAGNOSIS — C77.8 MALIGNANT NEOPLASM METASTATIC TO LYMPH NODES OF MULTIPLE SITES (H): ICD-10-CM

## 2024-10-29 DIAGNOSIS — K74.60 CIRRHOSIS OF LIVER WITH ASCITES, UNSPECIFIED HEPATIC CIRRHOSIS TYPE (H): ICD-10-CM

## 2024-10-29 LAB
ANION GAP SERPL CALCULATED.3IONS-SCNC: 12 MMOL/L (ref 7–15)
BUN SERPL-MCNC: 59.5 MG/DL (ref 8–23)
CALCIUM SERPL-MCNC: 8 MG/DL (ref 8.8–10.4)
CHLORIDE SERPL-SCNC: 110 MMOL/L (ref 98–107)
CHOLEST SERPL-MCNC: 128 MG/DL
CREAT SERPL-MCNC: 1.69 MG/DL (ref 0.67–1.17)
EGFRCR SERPLBLD CKD-EPI 2021: 42 ML/MIN/1.73M2
ERYTHROCYTE [DISTWIDTH] IN BLOOD BY AUTOMATED COUNT: 21.5 % (ref 10–15)
EST. AVERAGE GLUCOSE BLD GHB EST-MCNC: 120 MG/DL
GLUCOSE SERPL-MCNC: 109 MG/DL (ref 70–99)
HBA1C MFR BLD: 5.8 %
HCO3 SERPL-SCNC: 19 MMOL/L (ref 22–29)
HCT VFR BLD AUTO: 22.9 % (ref 40–53)
HDLC SERPL-MCNC: 36 MG/DL
HGB BLD-MCNC: 7.3 G/DL (ref 13.3–17.7)
LACTATE SERPL-SCNC: 2.7 MMOL/L (ref 0.7–2)
LACTATE SERPL-SCNC: 2.8 MMOL/L (ref 0.7–2)
LDLC SERPL CALC-MCNC: 42 MG/DL
MCH RBC QN AUTO: 34.4 PG (ref 26.5–33)
MCHC RBC AUTO-ENTMCNC: 31.9 G/DL (ref 31.5–36.5)
MCV RBC AUTO: 108 FL (ref 78–100)
NONHDLC SERPL-MCNC: 92 MG/DL
NT-PROBNP SERPL-MCNC: ABNORMAL PG/ML (ref 0–900)
PLAT MORPH BLD: NORMAL
PLATELET # BLD AUTO: 34 10E3/UL (ref 150–450)
POTASSIUM SERPL-SCNC: 4.4 MMOL/L (ref 3.4–5.3)
RBC # BLD AUTO: 2.12 10E6/UL (ref 4.4–5.9)
RBC MORPH BLD: NORMAL
SODIUM SERPL-SCNC: 141 MMOL/L (ref 135–145)
TRIGL SERPL-MCNC: 249 MG/DL
WBC # BLD AUTO: 3.9 10E3/UL (ref 4–11)

## 2024-10-29 PROCEDURE — 99231 SBSQ HOSP IP/OBS SF/LOW 25: CPT | Performed by: NURSE PRACTITIONER

## 2024-10-29 PROCEDURE — 83605 ASSAY OF LACTIC ACID: CPT | Performed by: NURSE PRACTITIONER

## 2024-10-29 PROCEDURE — 83036 HEMOGLOBIN GLYCOSYLATED A1C: CPT | Performed by: NURSE PRACTITIONER

## 2024-10-29 PROCEDURE — 120N000005 HC R&B MS OVERFLOW UMMC

## 2024-10-29 PROCEDURE — 80048 BASIC METABOLIC PNL TOTAL CA: CPT | Performed by: NURSE PRACTITIONER

## 2024-10-29 PROCEDURE — 250N000013 HC RX MED GY IP 250 OP 250 PS 637: Performed by: NURSE PRACTITIONER

## 2024-10-29 PROCEDURE — 250N000011 HC RX IP 250 OP 636: Performed by: NURSE PRACTITIONER

## 2024-10-29 PROCEDURE — 99223 1ST HOSP IP/OBS HIGH 75: CPT | Mod: FS | Performed by: NURSE PRACTITIONER

## 2024-10-29 PROCEDURE — 83880 ASSAY OF NATRIURETIC PEPTIDE: CPT | Performed by: NURSE PRACTITIONER

## 2024-10-29 PROCEDURE — 85027 COMPLETE CBC AUTOMATED: CPT | Performed by: NURSE PRACTITIONER

## 2024-10-29 PROCEDURE — 80061 LIPID PANEL: CPT | Performed by: NURSE PRACTITIONER

## 2024-10-29 RX ORDER — NALOXONE HYDROCHLORIDE 0.4 MG/ML
0.2 INJECTION, SOLUTION INTRAMUSCULAR; INTRAVENOUS; SUBCUTANEOUS
Status: DISCONTINUED | OUTPATIENT
Start: 2024-10-29 | End: 2024-11-16 | Stop reason: HOSPADM

## 2024-10-29 RX ORDER — METHOCARBAMOL 500 MG/1
500 TABLET, FILM COATED ORAL EVERY 6 HOURS PRN
Status: DISCONTINUED | OUTPATIENT
Start: 2024-10-29 | End: 2024-11-16 | Stop reason: HOSPADM

## 2024-10-29 RX ORDER — HEPARIN SODIUM,PORCINE 10 UNIT/ML
5-10 VIAL (ML) INTRAVENOUS EVERY 24 HOURS
Status: DISCONTINUED | OUTPATIENT
Start: 2024-10-29 | End: 2024-11-16 | Stop reason: HOSPADM

## 2024-10-29 RX ORDER — AMOXICILLIN 250 MG
2 CAPSULE ORAL 2 TIMES DAILY PRN
Status: DISCONTINUED | OUTPATIENT
Start: 2024-10-29 | End: 2024-11-16 | Stop reason: HOSPADM

## 2024-10-29 RX ORDER — ACETAMINOPHEN 650 MG/1
650 SUPPOSITORY RECTAL EVERY 4 HOURS PRN
Status: DISCONTINUED | OUTPATIENT
Start: 2024-10-29 | End: 2024-11-16 | Stop reason: HOSPADM

## 2024-10-29 RX ORDER — HEPARIN SODIUM (PORCINE) LOCK FLUSH IV SOLN 100 UNIT/ML 100 UNIT/ML
5-10 SOLUTION INTRAVENOUS
Status: DISCONTINUED | OUTPATIENT
Start: 2024-10-29 | End: 2024-11-16 | Stop reason: HOSPADM

## 2024-10-29 RX ORDER — SPIRONOLACTONE 25 MG/1
50 TABLET ORAL DAILY
Status: DISCONTINUED | OUTPATIENT
Start: 2024-10-30 | End: 2024-11-16 | Stop reason: HOSPADM

## 2024-10-29 RX ORDER — TRAMADOL HYDROCHLORIDE 50 MG/1
50 TABLET ORAL DAILY PRN
Status: ON HOLD | COMMUNITY

## 2024-10-29 RX ORDER — DEXTROSE MONOHYDRATE 25 G/50ML
25-50 INJECTION, SOLUTION INTRAVENOUS
Status: DISCONTINUED | OUTPATIENT
Start: 2024-10-29 | End: 2024-11-16 | Stop reason: HOSPADM

## 2024-10-29 RX ORDER — ACETAMINOPHEN 325 MG/1
650 TABLET ORAL EVERY 4 HOURS PRN
Status: DISCONTINUED | OUTPATIENT
Start: 2024-10-29 | End: 2024-11-16 | Stop reason: HOSPADM

## 2024-10-29 RX ORDER — NALOXONE HYDROCHLORIDE 0.4 MG/ML
0.4 INJECTION, SOLUTION INTRAMUSCULAR; INTRAVENOUS; SUBCUTANEOUS
Status: DISCONTINUED | OUTPATIENT
Start: 2024-10-29 | End: 2024-11-16 | Stop reason: HOSPADM

## 2024-10-29 RX ORDER — VALSARTAN 160 MG/1
160 TABLET ORAL DAILY
Status: DISCONTINUED | OUTPATIENT
Start: 2024-10-30 | End: 2024-11-16 | Stop reason: HOSPADM

## 2024-10-29 RX ORDER — PROCHLORPERAZINE MALEATE 5 MG/1
5 TABLET ORAL EVERY 6 HOURS PRN
Status: DISCONTINUED | OUTPATIENT
Start: 2024-10-29 | End: 2024-11-16 | Stop reason: HOSPADM

## 2024-10-29 RX ORDER — TIZANIDINE 2 MG/1
2-4 TABLET ORAL EVERY 8 HOURS PRN
Status: DISCONTINUED | OUTPATIENT
Start: 2024-10-29 | End: 2024-10-30

## 2024-10-29 RX ORDER — METOLAZONE 2.5 MG/1
5 TABLET ORAL ONCE
Status: COMPLETED | OUTPATIENT
Start: 2024-10-29 | End: 2024-10-29

## 2024-10-29 RX ORDER — NITROGLYCERIN 0.4 MG/1
0.4 TABLET SUBLINGUAL EVERY 5 MIN PRN
Status: DISCONTINUED | OUTPATIENT
Start: 2024-10-29 | End: 2024-11-16 | Stop reason: HOSPADM

## 2024-10-29 RX ORDER — OXYCODONE HYDROCHLORIDE 5 MG/1
5-10 TABLET ORAL EVERY 6 HOURS PRN
Status: DISCONTINUED | OUTPATIENT
Start: 2024-10-29 | End: 2024-11-16 | Stop reason: HOSPADM

## 2024-10-29 RX ORDER — HEPARIN SODIUM,PORCINE 10 UNIT/ML
5-10 VIAL (ML) INTRAVENOUS
Status: DISCONTINUED | OUTPATIENT
Start: 2024-10-29 | End: 2024-11-16 | Stop reason: HOSPADM

## 2024-10-29 RX ORDER — LIDOCAINE 40 MG/G
CREAM TOPICAL
Status: DISCONTINUED | OUTPATIENT
Start: 2024-10-29 | End: 2024-11-16 | Stop reason: HOSPADM

## 2024-10-29 RX ORDER — ASPIRIN 81 MG/1
81 TABLET, CHEWABLE ORAL DAILY
Status: DISCONTINUED | OUTPATIENT
Start: 2024-10-30 | End: 2024-11-16 | Stop reason: HOSPADM

## 2024-10-29 RX ORDER — AMOXICILLIN 250 MG
1 CAPSULE ORAL 2 TIMES DAILY PRN
Status: DISCONTINUED | OUTPATIENT
Start: 2024-10-29 | End: 2024-11-16 | Stop reason: HOSPADM

## 2024-10-29 RX ORDER — NICOTINE POLACRILEX 4 MG
15-30 LOZENGE BUCCAL
Status: DISCONTINUED | OUTPATIENT
Start: 2024-10-29 | End: 2024-11-16 | Stop reason: HOSPADM

## 2024-10-29 RX ORDER — POLYETHYLENE GLYCOL 3350 17 G/17G
17 POWDER, FOR SOLUTION ORAL DAILY PRN
Status: DISCONTINUED | OUTPATIENT
Start: 2024-10-29 | End: 2024-11-02

## 2024-10-29 RX ORDER — MAGNESIUM HYDROXIDE/ALUMINUM HYDROXICE/SIMETHICONE 120; 1200; 1200 MG/30ML; MG/30ML; MG/30ML
30 SUSPENSION ORAL EVERY 4 HOURS PRN
Status: DISCONTINUED | OUTPATIENT
Start: 2024-10-29 | End: 2024-11-16 | Stop reason: HOSPADM

## 2024-10-29 RX ORDER — PANTOPRAZOLE SODIUM 40 MG/1
40 TABLET, DELAYED RELEASE ORAL
Status: DISCONTINUED | OUTPATIENT
Start: 2024-10-30 | End: 2024-11-16 | Stop reason: HOSPADM

## 2024-10-29 RX ORDER — METOPROLOL SUCCINATE 50 MG/1
150 TABLET, EXTENDED RELEASE ORAL DAILY
Status: DISCONTINUED | OUTPATIENT
Start: 2024-10-30 | End: 2024-11-01

## 2024-10-29 RX ORDER — BUMETANIDE 0.25 MG/ML
4 INJECTION, SOLUTION INTRAMUSCULAR; INTRAVENOUS ONCE
Status: COMPLETED | OUTPATIENT
Start: 2024-10-29 | End: 2024-10-29

## 2024-10-29 RX ORDER — IBUPROFEN 200 MG
950 CAPSULE ORAL DAILY
Status: DISCONTINUED | OUTPATIENT
Start: 2024-10-30 | End: 2024-11-16 | Stop reason: HOSPADM

## 2024-10-29 RX ADMIN — BUMETANIDE 4 MG: 0.25 INJECTION INTRAMUSCULAR; INTRAVENOUS at 17:05

## 2024-10-29 RX ADMIN — HEPARIN, PORCINE (PF) 10 UNIT/ML INTRAVENOUS SYRINGE 5 ML: at 17:17

## 2024-10-29 RX ADMIN — METOLAZONE 5 MG: 2.5 TABLET ORAL at 17:06

## 2024-10-29 RX ADMIN — OXYCODONE HYDROCHLORIDE 10 MG: 5 TABLET ORAL at 20:14

## 2024-10-29 ASSESSMENT — ACTIVITIES OF DAILY LIVING (ADL)
ADLS_ACUITY_SCORE: 0

## 2024-10-29 NOTE — PLAN OF CARE
Hours of Care:  1600 - 0730    D: HF exacerbation and hypervolemia    Neuro: A/O x 4.  Call light appropriate.  Able to make needs known.  Respiratory:  On room air. Sats well.  Cardiac: SR.  VSS.    GI: Last BM 10/28 per pt.  No report of nausea or vomiting.  : Urinating adequate amounts of clear, yellow urine. On bumex IV push.  Skin:  See PCS for assessment and treatment of wounds and surgical incisions.  LDA: Power Port, SL  Pain: Endorses 7-8/10 pain. PRN oxycodone given x2. Robaxin and Tylenol given x1.  Critical labs: Pt lactic is elevated. Triggered sepsis (see RRT note). Also, hg 6.3 and platelet 29 this am. Team aware and following. Orders placed.   Diet: 2 g Na.      P: Continue to monitor Pt status and report changes to primary team.

## 2024-10-29 NOTE — LETTER
Transition Communication Hand-off for Care Transitions to Next Level of Care Provider    Name: Gucci Logan  : 1950  MRN #: [id number]  Primary Care Provider: Richi Jaramillo     Primary Clinic: [address] [place] [id number]     Reason for Hospitalization:  heart failure  Heart failure (H)  Admit Date/Time: 10/29/2024  2:20 PM  Discharge Date: 2024  Payor Source: Payor: [facility] / Plan: [facility] MEDICARE / Product Type: HMO /       Concern for non-adherence with plan of care:   no          Any outstanding tests or procedures:    Procedures       Standing Orders Interval    Paracentesis  weekly until 2025            Referrals       Future Labs/Procedures    Primary Care - Care Coordination Referral     Process Instructions:    Services are provided by a Care Coordinator for people with complex needs such as: medical, social, or financial troubles.  The Care Coordinator works with the patient and their Primary Care Provider to determine health goals, obtain resources, achieve outcomes, and develop care plans that help coordinate the patient's care.     Comments:         Medication Therapy Management Referral     Process Instructions:        This referral will be filtered to a centralized scheduling office at [facility] and the patient will receive a call to schedule an appointment at a [place] location most convenient for them.    Scheduling Instructions:    MTM referral reason  Total Score: 2           Patient has 5 PTA or Discharge Medications AND one of the following   diagnoses: DM,HF,COPD, or AMI DX    Patient taking oral chemotherapy       Comments:    This service is designed to help you get the most from your medications.  A specially trained pharmacist will work closely with you and your doctors  to solve any problems related to your medications and to help you get the   best results from taking them.      The Medication Therapy Management staff  will call you to schedule an appointment.                  Key Recommendations:      IVANIA Abebe    AVS/Discharge Summary is the source of truth; this is a helpful guide for improved communication of patient story

## 2024-10-29 NOTE — PROGRESS NOTES
Brief Hem/Onc Note    Confirmed with the patient's primary oncologist that it is okay for him to continue daralutamide while admitted if he brought the medication to the hospital.     We will plan to formally see Mr. Logan for consultation tomorrow. Please reach out if any additional questions come up before then.     Brendan Orourke  Fellow PGY-4  Hematology and Oncology

## 2024-10-29 NOTE — CODE/RAPID RESPONSE
Rapid Response Team Note    Assessment   A rapid response was called on Gucci Logan due to lactic acidosis. This presentation is likely due to prostate cancer, cirrhosis, anemia, possibly diuresis.    Plan   -Labs from admission are still pending.  Await admission labs  -No fluids are indicated for this patient.  Suspect cardiorenal syndrome contributing to the clinical picture given worsening NT proBNP, worsening renal function, increased weight.  -Recheck lactic acid in 2 to 3 hours for CMS compliance  -Consider infectious workup pending further lab results that were already ordered on admission.    Of note, patient reports that he has recently recovered from sinusitis for which she was given Augmentin.  He also notes a recent right knee septic arthritis for which he was hospitalized.  States he is prone to frequent infections.    -  The Cardiology primary team was  contacted by the bedside nurse.  -  Disposition: The patient will remain on the current unit. We will continue to monitor this patient closely.  -  Reassessment and plan follow-up will be performed by the primary team    DIANE Nolan [facility]  Rapid Response Team DONTE  Securely message with [employer]     Medical Decision Making       30 MINUTES SPENT BY ME on the date of service doing chart review, history, exam, documentation & further activities per the note.          Hospital Course   Brief Summary of events leading to rapid response:   Primarily labs contributing to SIRS algorithm for lactic acid draw resulting in LA of 2.7      Physical Exam   Vital Signs: Temp: 98.5  F (36.9  C) Temp src: Oral BP: (!) 86/46     Resp: 18 SpO2: 96 % O2 Device: None (Room air)      Physical Exam  Constitutional:       General: He is not in acute distress.     Appearance: He is not toxic-appearing.   Cardiovascular:      Rate and Rhythm: Normal rate and regular rhythm.      Heart sounds: Murmur heard.      Systolic murmur is present.   Musculoskeletal:          General: Swelling present.   Skin:     General: Skin is warm.   Neurological:      Mental Status: Mental status is at baseline.   Psychiatric:         Mood and Affect: Mood normal.       Significant Results and Procedures   Lactic acid 2.7  Platelet count 34  Hemoglobin 7.3    Sepsis Evaluation   The patient is not known to have an infection.    NO EVIDENCE OF SEPSIS at this time.  Vital sign, physical exam, and lab findings are due to cancer, cirrhosis, anemia, diuresis.

## 2024-10-29 NOTE — PHARMACY-ADMISSION MEDICATION HISTORY
Pharmacy Intern Admission Medication History    Admission medication history is complete. The information provided in this note is only as accurate as the sources available at the time of the update.    Information Source(s): Patient and CareEverywhere/SureScripts via in-person    Pertinent Information:   Per patient, he has both tramadol and oxycodone at home and will one or the other PRN for pain management    Changes made to PTA medication list:  Added: None  Deleted:  Dexamethasone 4 mg tablet  Ondansetron 8 mg tablet  Prochlorperazine 10 mg tablet  Senna-docusate 806-50 mg tablet  Spironolactone 50 mg tablet  Valacyclovir 1000 mg tablet  Changed: None    Allergies reviewed with patient and updates made in EHR: yes    Medication History Completed By: Jasmyne Contreras 10/29/2024 5:47 PM    PTA Med List   Medication Sig Last Dose/Taking    acetaminophen (TYLENOL) 325 MG tablet Take 2 tablets (650 mg) by mouth every 4 hours as needed for other (For optimal non-opioid multimodal pain management to improve pain control.) Past Week    aspirin 81 MG EC tablet Take 1 tablet (81 mg) by mouth daily 10/29/2024    bumetanide (BUMEX) 2 MG tablet Take 1 tablet (2 mg) by mouth daily. 10/29/2024    calcium citrate (CITRACAL) 950 (200 Ca) MG tablet Take 1 tablet (950 mg) by mouth daily 10/29/2024    darolutamide (NUBEQA) 300 MG tablet Take 2 tablets (600 mg) by mouth 2 times daily for 21 days. . Swallow tablets whole. Take with food. Avoid grapefruit or grapefruit juice. 10/29/2024 at  8:00 AM    diclofenac (VOLTAREN) 1 % topical gel Apply 4 g topically 3 times daily as needed for moderate pain (bursitis) Past Month    metFORMIN (GLUCOPHAGE) 500 MG tablet Take 1 tablet (500 mg) by mouth 2 times daily (with meals). 10/29/2024    methocarbamol (ROBAXIN) 500 MG tablet Take 1 Tablet (500 mg) by mouth every 6 hours if needed for Muscle Spasm PO 1st choice. More than a month    metoprolol succinate ER (TOPROL XL) 100 MG 24 hr tablet 1 1/2  ( 150 mg ) po daily 10/29/2024    multivitamin w/minerals (THERA-VIT-M) tablet Take 1 tablet by mouth daily 10/29/2024    omeprazole (PRILOSEC) 20 MG DR capsule TAKE 1 CAPSULE BY MOUTH ONCE DAILY 30 TO 60 MINUTES BEFORE A MEAL 10/29/2024    oxyCODONE (ROXICODONE) 5 MG tablet Take 1-2 tablets (5-10 mg) by mouth every 6 hours as needed for pain. Past Week    predniSONE (DELTASONE) 5 MG tablet Take 1 tablet (5 mg) by mouth daily (with breakfast) Do not take prednisone on days when taking dexamethasone. 10/29/2024    tiZANidine (ZANAFLEX) 4 MG tablet TAKE 1/2 (ONE-HALF) TO 1  TABLET BY MOUTH UP TO THREE TIMES DAILY AS NEEDED FOR MUSCLE PAIN More than a month    traMADol (ULTRAM) 50 MG tablet Take 50 mg by mouth daily as needed for severe pain. Past Week    valsartan (DIOVAN) 160 MG tablet Take 1 tablet (160 mg) by mouth daily 10/29/2024    vitamin D3 (CHOLECALCIFEROL) 50 mcg (2000 units) tablet Take 1 tablet (50 mcg) by mouth daily 10/29/2024

## 2024-10-29 NOTE — PROGRESS NOTES
Admission          10/29/2024  2:20 PM  -----------------------------------------------------------  Reason for admission:  Primary team notified of pt arrival.  Admitted from: Home  Via: wheelchair  Accompanied by: family  Belongings: Placed in closet  Admission Profile: not complete   Teaching: orientation to unit and call light- call light within reach, call don't fall, use of console, meal times, when to call for the RN, and enforced importance of safety   Access: Power port  Telemetry:Placed on pt  Ht./Wt.: complete  Code Status verified on armband: yes  2 RN Skin Assessment Completed By: Karie Ngo Noted 3 open sores on buttock area, quite red in sacrum/coccyx but blanchable. Weeping site to R abdomen from injection. Scab on top of L foot. Open weeping area below L knee and rash on bilateral legs all noted upon admission.   Med Rec completed: no  Suction/Ambu bag/Flowmeter at bedside: yes    Pt status: Port accessed without difficulty. Patient up in chair. VSS. WO orders obtained. Patient brought home chemo medications from that were given to pharmacy to re-lable.     Temp:  [98.4  F (36.9  C)-98.5  F (36.9  C)] 98.5  F (36.9  C)  Resp:  [18] 18  BP: ()/(46-51) 86/46  Cuff Mean (mmHg):  [66] 66  SpO2:  [96 %] 96 %

## 2024-10-29 NOTE — TELEPHONE ENCOUNTER
Canelo called to ask if he should bring his oral chemo with him for his admission    Called the inpatient pharmacist and clarified- he should bring them with and if for some reason they decide not to continue the chemo, he can bring them home.      Canelo wanted clarification with his oncology team if the medication would be continued while diuresising.  Explained that the inpatient team will make that determination and encouraged him to bring his medications and follow the plan as stated yesterday.

## 2024-10-29 NOTE — H&P
[facility]   Cardiology Service  History and Physical      Gucci Logan MRN# [id number]   YOB: 1950 Age: 74 year old       Admission Date: 10/29/2024    Assessment and plan:   Gucci Logan is a 74 year old male with a history of HCM, metastatic prostate cancer, cirrhosis d/t chronic HCV infection who presents for heart failure exacerbation    # HCM with EDGAR with valsalve LVOTO gradient   # Acute on chronic heart failure with preserved ejection fraction  # HTN  Patient recently seen in [facility] clinic for enrollment where he was found to be grossly hypervolemic. Patient was reluctant to start loop diuretic but agreeable to spironolactone. Trialed PO Bumex starting 10/28 with minimal improvement, admitted for IV diuresis.   - Repeat TTE  - Volume status: grossly hypervolemic. Patient states a 'good' weight for him is 230 lbs, though does not have scale at home. Weight on admission 272 lbs. IV Bumex 4 mg X 1, likely place on gtt tomorrow.   - Continue metoprolol succinate 150 mg daily  - On Valsartan 160 mg daily- tolerating current dose, no changes today  - Avoid vasodilators  - Ranolazine was stopped d/t no improvement of his symptoms  - Dr. Gonsales office working on getting a myosin inhibitor for him as his symptoms   - Has been recommended to do genetic counseling previously  - K > 4, Mg > 2, RN protocols ordered  - Recommended lymphedema wraps, patient refused  - BID BMP with aggressive diuresis  - Strict I&O's  - Daily weights     # Metastatic prostate cancer   - Follows with oncology OP, consulted here for guidance on continuing chemotherapy while admitted     # Cirrhosis d/t chronic HCV infection  # Ascites  - Management per outpatient liver team   - CAPS team for paracentesis evaluation.      # CKD stage 3  - Cr up to 1.26 today, likely cardiorenal  - Diuretic management as above  - BMP daily    # Type II Diabetes Mellitus  # Class II obesity  -  "Updating A1C this admission  - Hold PTA Metformin  - medium intensity sliding scale insulin with hypoglycemia protocol    FEN: Regular  Code status: FULL   Prophylaxis:  ambulation  Isolation: Contact  Disposition: 5+ days    Clinically Significant Risk Factors Present on Admission                 # Drug Induced Platelet Defect: home medication list includes an antiplatelet medication   # Hypertension: Noted on problem list         # Obesity: Estimated body mass index is 39.04 kg/m  as calculated from the following:    Height as of 10/24/24: 1.778 m (5' 10\").    Weight as of 10/24/24: 123.4 kg (272 lb 1.6 oz).       # Financial/Environmental Concerns:          Cardiomyopathy  Diastolic acute    Fluid overload, unspecified    Chronic Liver Disease: Chronic hepatitis, unspecified    Metastatic Cancer: Prostate Cancer Metastatic to bone    CKD POA List: Stage 3a (GFR 45-59)    Patient discussed with Dr. Tigist CONTRERAS, CNP  [facility] Cardiology  Vocera    Medical Decision Making       75 MINUTES SPENT BY ME on the date of service doing chart review, history, exam, documentation & further activities per the note.        I have seen, interviewed, and examined patient. I have reviewed the laboratory tests, imaging, and other investigations. I have reviewed the management plan with the patient. I discussed with the team and agree with the findings and plan in this nurse practitioner's note. In addition, changes in the physical examination, assessment and plan have been incorporated into the note by myself, as to make it a single cohesive document.     My decision today: Continue diuresis      Tigist Yepez MD, MS  Cardiology/Cardiac EP Attending Staff         Chief Complaint: hypervolemia    HPI:   Gucci Logan is a 74 year old male with a history of HCM, metastatic prostate cancer, cirrhosis d/t chronic HCV infection who presents for heart failure exacerbation    Patient recently seen in [facility] " clinic for enrollment where he was found to be grossly hypervolemic. Patient was reluctant to start loop diuretic but agreeable to spironolactone. Trialed PO Bumex starting 10/28 with minimal improvement, admitted for IV diuresis.     On exam, patient with edema up to abdomen with significant ascites and 30-40 lb weight gain. Discussed need for prolonged hospitalization for aggressive diuresis. Patient requests oncology consult for chemotherapy management.     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    Diverticular disease of colon 07/14/2015    Esophageal reflux 03/01/2014    Gastroesophageal reflux disease, esophagitis presence not specified 10/06/2016    IMO Regulatory Load OCT 2020      Glaucoma suspect of both eyes 04/30/2024    Hearing problem     Hiatal hernia     Hyperlipidemia LDL goal <100 04/19/2017    Hypertension     Hypertension goal BP (blood pressure) < 140/90 02/08/2013    Intertrochanteric fracture of left femur, closed, initial encounter (H) 05/19/2024    Jaundice 05/31/2008    Liver disease     Malignant 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 Not keen to use it.     Stage 3 chronic kidney disease, unspecified whether stage 3a or 3b CKD (H) 01/23/2024    Syncope, unspecified syncope type 02/20/2023    Thrombocytopenia (H) 08/28/2008    Overview:   8/28/2008, attributed to liver disease with portal hypertension and functional splenomegaly         Past Surgical History:   Procedure Laterality Date    ARTHROSCOPY KNEE RT/LT      (L) with partial medial meniscectomy    CATARACT IOL, RT/LT  Nov and Dec 2017    COLONOSCOPY N/A 4/24/2019    Procedure: COLONOSCOPY, WITH POLYPECTOMY AND BIOPSY;  Surgeon: Leventhal, Thomas Michael, MD;  Location: [facility]    COLONOSCOPY N/A 9/14/2022    Procedure: COLONOSCOPY;  Surgeon: Rafa Renee MD;  Location: [facility]    DACRYOCYSTORHINOSTOMY Left 10/16/2018    Procedure: DACRYOCYSTORHINOSTOMY;  Surgeon: Madhu Krause MD;  Location: [facility]    ENDOSCOPIC ENDONASAL SURGERY  1994    ENDOSCOPY  2-19-15    ESOPHAGOSCOPY, GASTROSCOPY, DUODENOSCOPY (EGD), COMBINED N/A 4/24/2019    Procedure: COMBINED ESOPHAGOSCOPY, GASTROSCOPY, DUODENOSCOPY (EGD) - hold aspirin ibuprofen or naproxen for one week prior (per physician order);  Surgeon: Leventhal, Thomas Michael, MD;  Location: [facility] OR    ESOPHAGOSCOPY, GASTROSCOPY, DUODENOSCOPY (EGD), COMBINED N/A 5/23/2023    Procedure: Esophagoscopy, gastroscopy, duodenoscopy, combined;  Surgeon: Rafa Renee MD;  Location:  GI    EYE SURGERY      Hernia surgery Left 1994    NASAL/SINUS POLYPECTOMY      OPEN REDUCTION INTERNAL FIXATION HIP NAILING Left 5/20/2024    Procedure: open reduction internal fixation hip nailing left;  Surgeon: Rafa Wagner MD;  Location:  OR    REPAIR PTOSIS Left 10/16/2018    Procedure: LEFT UPPER LID PTOSIS AND BILATERAL  BROW PTOSIS REPAIR WITH LEFT DACRYOCYSTORHINOSTOMY ;  Surgeon: Madhu Krause MD;  Location: [facility]    REPAIR PTOSIS BROW Bilateral  10/16/2018    Procedure: REPAIR PTOSIS BROW;  Surgeon: Madhu Krause MD;  Location: [facility]    ROTATOR CUFF REPAIR RT/LT Right     ZZC OPEN RX ANKLE DISLOCATN+FIXATN      (R)    ZZC SPINAL FUSION,ANT,EA ADNL LEVEL      T12 - L1       Current Facility-Administered Medications   Medication Dose Route Frequency Provider Last Rate Last Admin    acetaminophen (TYLENOL) Suppository 650 mg  650 mg Rectal Q4H PRN Lexi Crespo APRN CNP        acetaminophen (TYLENOL) tablet 650 mg  650 mg Oral Q4H PRN Lexi Crespo APRN CNP        alum & mag hydroxide-simethicone (MAALOX) suspension 30 mL  30 mL Oral Q4H PRN Lexi Crespo APRN CNP        aspirin EC tablet 81 mg  81 mg Oral Daily Lexi Crespo APRN CNP        calcium citrate (CITRACAL) tablet 950 mg  950 mg Oral Daily Lexi Crespo APRN CNP        diclofenac (VOLTAREN) 1 % topical gel 4 g  4 g Topical TID PRN Lexi Crespo APRN CNP        lidocaine (LMX4) cream   Topical Q1H PRN Lexi Crespo APRN CNP        lidocaine 1 % 0.1-1 mL  0.1-1 mL Other Q1H PRN Lexi Crespo APRN CNP        medication instruction   Does not apply Continuous PRN Lexi Crespo APRN CNP        methocarbamol (ROBAXIN) tablet 500 mg  500 mg Oral Q6H PRN Lexi Crespo APRN CNP        metoprolol succinate ER (TOPROL XL) 24 hr tablet 150 mg  150 mg Oral Daily Lexi Crespo APRN CNP        nitroGLYcerin (NITROSTAT) sublingual tablet 0.4 mg  0.4 mg Sublingual Q5 Min PRN Lexi Crespo APRN CNP        [START ON 10/30/2024] omeprazole (PriLOSEC) CR capsule 20 mg  20 mg Oral QAM AC Lexi Crespo APRN CNP        oxyCODONE (ROXICODONE) tablet 5-10 mg  5-10 mg Oral Q6H PRN Crespo, Lexi E, APRN CNP        Patient is already receiving anticoagulation with heparin, enoxaparin (LOVENOX), warfarin (COUMADIN)  or other anticoagulant medication   Does not apply Continuous PRN Lexi Crespo, APRN CNP         polyethylene glycol (MIRALAX) Packet 17 g  17 g Oral Daily PRN Lexi Crespo APRN CNP        prochlorperazine (COMPAZINE) tablet 5 mg  5 mg Oral Q6H PRN Lexi Crespo APRN CNP        senna-docusate (SENOKOT-S/PERICOLACE) 8.6-50 MG per tablet 1 tablet  1 tablet Oral BID PRN Lexi Crespo APRN CNP        Or    senna-docusate (SENOKOT-S/PERICOLACE) 8.6-50 MG per tablet 2 tablet  2 tablet Oral BID PRN Lexi Crespo APRN CNP        sodium chloride (PF) 0.9% PF flush 3 mL  3 mL Intracatheter Q8H Lexi Crespo APRN CNP        sodium chloride (PF) 0.9% PF flush 3 mL  3 mL Intracatheter q1 min prn Lexi Crespo APRN CNP        spironolactone (ALDACTONE) tablet 50 mg  50 mg Oral Daily Lexi Crespo APRN CNP        tiZANidine (ZANAFLEX) tablet 2-4 mg  2-4 mg Oral Q8H PRN Lexi Crespo APRN CNP        valsartan (DIOVAN) tablet 160 mg  160 mg Oral Daily Lexi Crespo APRN CNP           Family History   Problem Relation Age of Onset    Alzheimer Disease Mother 85    Dementia Mother     Cerebrovascular Disease Father 50    Cancer Father     Hypertension Father     Neurologic Disorder No family hx of     Diabetes No family hx of        Social History     Tobacco Use    Smoking status: Former     Current packs/day: 0.00     Types: Cigarettes     Start date: 1990     Quit date: 1999     Years since quittin.8     Passive exposure: Never    Smokeless tobacco: Never   Substance Use Topics    Alcohol use: Yes     Comment: rare       Allergies   Allergen Reactions    Bee Venom Swelling     Gum swelling    Haemophilus B Polysaccharide Vaccine Other (See Comments)     PN: delirium  fever    Haemophilus Influenzae Nausea and Other (See Comments)     PN: delirium  fever    Other Reaction(s): Fever    PN: delirium  fever   PN: delirium  fever    Pneumococcal Vaccine      Other Reaction(s): *Unknown, adverse reaction       ROS:   CONSTITUTIONAL:No report of  fevers or chills  RESPIRATORY: No cough, wheezing, SOB, or hemoptysis  CARDIOVASCULAR: see HPI  MUSCULO-SKELETAL: No joint pain/swelling, no muscle pain  NEURO: No paresthesias, syncope, pre-syncope, lightheadness, dizziness or vertigo  ENDOCRINE: No temperature intolerance, no skin/hair changes  PSYCHIATRIC: No change in mood, feeling down/anxious, no change in sleep or appetite  GI: no melena or hematochezia, no change in bowel habits  : no hematuria or dysuria, no hesitancy, dribbling or incontinence  HEME: no easy bruising or bleeding, no history of anemia, no history of blood clots  SKIN: no rashes or sores, no unusual itching    Physical Examination:  Vitals: There were no vitals taken for this visit.  BMI= There is no height or weight on file to calculate BMI.    GENERAL APPEARANCE: healthy, alert and no distress  HEENT: Normocephalic, atraumatic. Sclera clear. No xanthelasmas. normal pupil size and reaction, normal palate, mucosa moist  NECK: JVP difficult assessment due to body habitus  CHEST: Normal work of breathing. Lungs clear to auscultation without rales, rhonchi or wheezes, no use of accessory muscles, no retractions  CARDIOVASCULAR: regular rhythm, normal S1 and S2, no S3 or S4 and loud systolic murmur present, no click or rub.  ABDOMEN: pitting edema, ascites, no masses palpable, bowel sounds normal  EXTREMITIES: warm, 4+ pitting edema LE to abdomen, DP/PT pulses 2+ bilaterally, no clubbing or cyanosis   NEURO: alert and oriented to person/place/time, normal speech  PSYCH: Mood and affect are appropriate  SKIN: no ecchymoses, no rashes    Laboratory:  CMP  Recent Labs   Lab 10/24/24  0650      POTASSIUM 4.6   CHLORIDE 113*   CO2 21*   ANIONGAP 6*   *   BUN 29.0*   CR 1.26*   GFRESTIMATED 60*   SOLEDAD 8.5*   PROTTOTAL 5.2*   ALBUMIN 2.8*   BILITOTAL 0.8   ALKPHOS 148   AST 32   ALT 21     CBC  Recent Labs   Lab 10/24/24  0650   WBC 7.2   RBC 2.46*   HGB 8.2*   HCT 25.7*   *   MCH  33.3*   MCHC 31.9   RDW 22.6*   PLT 75*       Lab Results   Component Value Date    TROPI <0.015 07/08/2020       Imaging:  EKG 8/19/24:       Echocardiogram 8/19/24:  Interpretation Summary  Known hypertrophic cardiomyopathy with asymmetric septal hypertrophy  phenotype.  Global and regional left ventricular function is normal with an EF of 60-65%.  There is asymmetric septal hypertrophy. The maximal wall thickness is 2.6 cm  in the basal anteroseptal segment. There is a resting LVOT gradient of 19 mmHg  that increases to 72 mmHg with Valsalva.  Global right ventricular function is normal. The right ventricle is normal  size.  There is systolic anterior motion of the mitral valve without septal contact.  Mild mitral regurgitation.  IVC diameter <2.1 cm collapsing >50% with sniff suggests a normal RA pressure  of 3 mmHg.  This study was compared with the study from 12/04/2023. No significant changes  noted.    NM Lexiscan stress test 9/12/23:    The nuclear stress test is negative for inducible myocardial ischemia or infarction.    Left ventricular function is normal.    The left ventricular ejection fraction at rest is 75%.  The left ventricular ejection fraction at stress is 69%.    LV cavity size normal.    Stress to rest cavity ratio is 1.05.  TID is absent.    There is no prior study for comparison.

## 2024-10-29 NOTE — PROGRESS NOTES
[facility]: Cancer Care                                                                                          Patient called to report he is being admitted to the [facility] today to [facility].  He had questions regarding continuing the oral chemo and wanted Dr. Plunkett to know he'd like to continue the oral chemo during admittance.  Message sent to care team.    Piedad SEBASTIAN RN  Cancer Care Coordinator  [facility]

## 2024-10-30 ENCOUNTER — APPOINTMENT (OUTPATIENT)
Dept: CARDIOLOGY | Facility: CLINIC | Age: 74
DRG: 291 | End: 2024-10-30
Attending: NURSE PRACTITIONER
Payer: COMMERCIAL

## 2024-10-30 DIAGNOSIS — Z51.11 ENCOUNTER FOR ANTINEOPLASTIC CHEMOTHERAPY: ICD-10-CM

## 2024-10-30 DIAGNOSIS — C79.51 METASTASIS TO BONE (H): ICD-10-CM

## 2024-10-30 DIAGNOSIS — C77.8 MALIGNANT NEOPLASM METASTATIC TO LYMPH NODES OF MULTIPLE SITES (H): Primary | ICD-10-CM

## 2024-10-30 DIAGNOSIS — C61 PROSTATE CANCER (H): ICD-10-CM

## 2024-10-30 LAB
ABO/RH(D): NORMAL
ABSOLUTE NEUTROPHILS, BODY FLUID: 579.6 /UL
ALBUMIN BODY FLUID SOURCE: NORMAL
ALBUMIN FLD-MCNC: 0.8 G/DL
ALBUMIN SERPL BCG-MCNC: 2.5 G/DL (ref 3.5–5.2)
ALP SERPL-CCNC: 124 U/L (ref 40–150)
ALT SERPL W P-5'-P-CCNC: 13 U/L (ref 0–70)
ANION GAP SERPL CALCULATED.3IONS-SCNC: 10 MMOL/L (ref 7–15)
ANION GAP SERPL CALCULATED.3IONS-SCNC: 9 MMOL/L (ref 7–15)
ANTIBODY SCREEN: NEGATIVE
APPEARANCE FLD: ABNORMAL
AST SERPL W P-5'-P-CCNC: 26 U/L (ref 0–45)
BILIRUB DIRECT SERPL-MCNC: 0.32 MG/DL (ref 0–0.3)
BILIRUB SERPL-MCNC: 0.9 MG/DL
BLD PROD TYP BPU: NORMAL
BLOOD COMPONENT TYPE: NORMAL
BUN SERPL-MCNC: 57.6 MG/DL (ref 8–23)
BUN SERPL-MCNC: 60.1 MG/DL (ref 8–23)
CALCIUM SERPL-MCNC: 7.7 MG/DL (ref 8.8–10.4)
CALCIUM SERPL-MCNC: 7.8 MG/DL (ref 8.8–10.4)
CELL COUNT BODY FLUID SOURCE: ABNORMAL
CHLORIDE SERPL-SCNC: 110 MMOL/L (ref 98–107)
CHLORIDE SERPL-SCNC: 111 MMOL/L (ref 98–107)
CODING SYSTEM: NORMAL
COLOR FLD: YELLOW
CREAT SERPL-MCNC: 1.57 MG/DL (ref 0.67–1.17)
CREAT SERPL-MCNC: 1.74 MG/DL (ref 0.67–1.17)
CROSSMATCH: NORMAL
EGFRCR SERPLBLD CKD-EPI 2021: 41 ML/MIN/1.73M2
EGFRCR SERPLBLD CKD-EPI 2021: 46 ML/MIN/1.73M2
ERYTHROCYTE [DISTWIDTH] IN BLOOD BY AUTOMATED COUNT: 21.2 % (ref 10–15)
ERYTHROCYTE [DISTWIDTH] IN BLOOD BY AUTOMATED COUNT: 21.4 % (ref 10–15)
GLUCOSE BLDC GLUCOMTR-MCNC: 113 MG/DL (ref 70–99)
GLUCOSE BLDC GLUCOMTR-MCNC: 114 MG/DL (ref 70–99)
GLUCOSE BLDC GLUCOMTR-MCNC: 144 MG/DL (ref 70–99)
GLUCOSE SERPL-MCNC: 113 MG/DL (ref 70–99)
GLUCOSE SERPL-MCNC: 97 MG/DL (ref 70–99)
HCO3 SERPL-SCNC: 22 MMOL/L (ref 22–29)
HCO3 SERPL-SCNC: 22 MMOL/L (ref 22–29)
HCT VFR BLD AUTO: 19.9 % (ref 40–53)
HCT VFR BLD AUTO: 21 % (ref 40–53)
HGB BLD-MCNC: 6.3 G/DL (ref 13.3–17.7)
HGB BLD-MCNC: 6.8 G/DL (ref 13.3–17.7)
HGB BLD-MCNC: 7.7 G/DL (ref 13.3–17.7)
ISSUE DATE AND TIME: NORMAL
LACTATE SERPL-SCNC: 1.5 MMOL/L (ref 0.7–2)
LVEF ECHO: NORMAL
LYMPHOCYTES NFR FLD MANUAL: 9 %
MAGNESIUM SERPL-MCNC: 1.6 MG/DL (ref 1.7–2.3)
MCH RBC QN AUTO: 33.5 PG (ref 26.5–33)
MCH RBC QN AUTO: 34.3 PG (ref 26.5–33)
MCHC RBC AUTO-ENTMCNC: 31.7 G/DL (ref 31.5–36.5)
MCHC RBC AUTO-ENTMCNC: 32.4 G/DL (ref 31.5–36.5)
MCV RBC AUTO: 106 FL (ref 78–100)
MCV RBC AUTO: 106 FL (ref 78–100)
MONOS+MACROS NFR FLD MANUAL: 12 %
NEUTS BAND NFR FLD MANUAL: 79 %
PLATELET # BLD AUTO: 29 10E3/UL (ref 150–450)
PLATELET # BLD AUTO: 29 10E3/UL (ref 150–450)
POTASSIUM SERPL-SCNC: 3.8 MMOL/L (ref 3.4–5.3)
POTASSIUM SERPL-SCNC: 3.9 MMOL/L (ref 3.4–5.3)
PROT FLD-MCNC: 1.2 G/DL
PROT SERPL-MCNC: 4.5 G/DL (ref 6.4–8.3)
PROTEIN BODY FLUID SOURCE: NORMAL
RBC # BLD AUTO: 1.88 10E6/UL (ref 4.4–5.9)
RBC # BLD AUTO: 1.98 10E6/UL (ref 4.4–5.9)
SODIUM SERPL-SCNC: 142 MMOL/L (ref 135–145)
SODIUM SERPL-SCNC: 142 MMOL/L (ref 135–145)
SPECIMEN EXPIRATION DATE: NORMAL
UNIT ABO/RH: NORMAL
UNIT NUMBER: NORMAL
UNIT STATUS: NORMAL
UNIT TYPE ISBT: 6200
WBC # BLD AUTO: 1.8 10E3/UL (ref 4–11)
WBC # BLD AUTO: 2 10E3/UL (ref 4–11)
WBC # FLD AUTO: 730 /UL

## 2024-10-30 PROCEDURE — 84460 ALANINE AMINO (ALT) (SGPT): CPT | Performed by: NURSE PRACTITIONER

## 2024-10-30 PROCEDURE — G0463 HOSPITAL OUTPT CLINIC VISIT: HCPCS

## 2024-10-30 PROCEDURE — 85027 COMPLETE CBC AUTOMATED: CPT | Performed by: NURSE PRACTITIONER

## 2024-10-30 PROCEDURE — 49083 ABD PARACENTESIS W/IMAGING: CPT | Performed by: STUDENT IN AN ORGANIZED HEALTH CARE EDUCATION/TRAINING PROGRAM

## 2024-10-30 PROCEDURE — 93306 TTE W/DOPPLER COMPLETE: CPT | Mod: 26 | Performed by: INTERNAL MEDICINE

## 2024-10-30 PROCEDURE — 80048 BASIC METABOLIC PNL TOTAL CA: CPT | Performed by: NURSE PRACTITIONER

## 2024-10-30 PROCEDURE — 85018 HEMOGLOBIN: CPT | Performed by: NURSE PRACTITIONER

## 2024-10-30 PROCEDURE — 87205 SMEAR GRAM STAIN: CPT | Performed by: NURSE PRACTITIONER

## 2024-10-30 PROCEDURE — 999N000128 HC STATISTIC PERIPHERAL IV START W/O US GUIDANCE

## 2024-10-30 PROCEDURE — 120N000005 HC R&B MS OVERFLOW UMMC

## 2024-10-30 PROCEDURE — 250N000009 HC RX 250: Performed by: NURSE PRACTITIONER

## 2024-10-30 PROCEDURE — 82310 ASSAY OF CALCIUM: CPT | Performed by: NURSE PRACTITIONER

## 2024-10-30 PROCEDURE — 86923 COMPATIBILITY TEST ELECTRIC: CPT | Performed by: NURSE PRACTITIONER

## 2024-10-30 PROCEDURE — 84157 ASSAY OF PROTEIN OTHER: CPT | Performed by: NURSE PRACTITIONER

## 2024-10-30 PROCEDURE — 250N000013 HC RX MED GY IP 250 OP 250 PS 637: Performed by: INTERNAL MEDICINE

## 2024-10-30 PROCEDURE — 89051 BODY FLUID CELL COUNT: CPT | Performed by: NURSE PRACTITIONER

## 2024-10-30 PROCEDURE — 99233 SBSQ HOSP IP/OBS HIGH 50: CPT | Mod: 25 | Performed by: NURSE PRACTITIONER

## 2024-10-30 PROCEDURE — 80053 COMPREHEN METABOLIC PANEL: CPT | Performed by: NURSE PRACTITIONER

## 2024-10-30 PROCEDURE — 82248 BILIRUBIN DIRECT: CPT | Performed by: NURSE PRACTITIONER

## 2024-10-30 PROCEDURE — 93306 TTE W/DOPPLER COMPLETE: CPT

## 2024-10-30 PROCEDURE — 36415 COLL VENOUS BLD VENIPUNCTURE: CPT | Performed by: NURSE PRACTITIONER

## 2024-10-30 PROCEDURE — 0W9G3ZZ DRAINAGE OF PERITONEAL CAVITY, PERCUTANEOUS APPROACH: ICD-10-PCS | Performed by: STUDENT IN AN ORGANIZED HEALTH CARE EDUCATION/TRAINING PROGRAM

## 2024-10-30 PROCEDURE — 83735 ASSAY OF MAGNESIUM: CPT | Performed by: INTERNAL MEDICINE

## 2024-10-30 PROCEDURE — 82247 BILIRUBIN TOTAL: CPT | Performed by: NURSE PRACTITIONER

## 2024-10-30 PROCEDURE — P9047 ALBUMIN (HUMAN), 25%, 50ML: HCPCS | Mod: JZ | Performed by: NURSE PRACTITIONER

## 2024-10-30 PROCEDURE — 250N000012 HC RX MED GY IP 250 OP 636 PS 637: Performed by: NURSE PRACTITIONER

## 2024-10-30 PROCEDURE — 250N000011 HC RX IP 250 OP 636: Performed by: NURSE PRACTITIONER

## 2024-10-30 PROCEDURE — 86900 BLOOD TYPING SEROLOGIC ABO: CPT | Performed by: INTERNAL MEDICINE

## 2024-10-30 PROCEDURE — 83605 ASSAY OF LACTIC ACID: CPT | Performed by: NURSE PRACTITIONER

## 2024-10-30 PROCEDURE — 85014 HEMATOCRIT: CPT | Performed by: NURSE PRACTITIONER

## 2024-10-30 PROCEDURE — 89050 BODY FLUID CELL COUNT: CPT | Performed by: NURSE PRACTITIONER

## 2024-10-30 PROCEDURE — P9016 RBC LEUKOCYTES REDUCED: HCPCS | Performed by: NURSE PRACTITIONER

## 2024-10-30 PROCEDURE — 250N000013 HC RX MED GY IP 250 OP 250 PS 637: Performed by: NURSE PRACTITIONER

## 2024-10-30 PROCEDURE — 87040 BLOOD CULTURE FOR BACTERIA: CPT | Performed by: NURSE PRACTITIONER

## 2024-10-30 PROCEDURE — 99222 1ST HOSP IP/OBS MODERATE 55: CPT | Mod: 25 | Performed by: INTERNAL MEDICINE

## 2024-10-30 PROCEDURE — 82042 OTHER SOURCE ALBUMIN QUAN EA: CPT | Performed by: NURSE PRACTITIONER

## 2024-10-30 RX ORDER — BUMETANIDE 0.25 MG/ML
4 INJECTION, SOLUTION INTRAMUSCULAR; INTRAVENOUS ONCE
Status: COMPLETED | OUTPATIENT
Start: 2024-10-30 | End: 2024-10-30

## 2024-10-30 RX ORDER — CEFTRIAXONE 2 G/1
2 INJECTION, POWDER, FOR SOLUTION INTRAMUSCULAR; INTRAVENOUS EVERY 24 HOURS
Status: DISCONTINUED | OUTPATIENT
Start: 2024-10-30 | End: 2024-11-03

## 2024-10-30 RX ORDER — ALBUMIN (HUMAN) 12.5 G/50ML
50 SOLUTION INTRAVENOUS ONCE
Status: COMPLETED | OUTPATIENT
Start: 2024-10-30 | End: 2024-10-30

## 2024-10-30 RX ORDER — TIZANIDINE 2 MG/1
2 TABLET ORAL EVERY 8 HOURS PRN
Status: DISCONTINUED | OUTPATIENT
Start: 2024-10-30 | End: 2024-11-08

## 2024-10-30 RX ORDER — PREDNISONE 5 MG/1
5 TABLET ORAL DAILY
Status: DISCONTINUED | OUTPATIENT
Start: 2024-10-30 | End: 2024-11-16 | Stop reason: HOSPADM

## 2024-10-30 RX ADMIN — BUMETANIDE 4 MG: 0.25 INJECTION INTRAMUSCULAR; INTRAVENOUS at 08:15

## 2024-10-30 RX ADMIN — ACETAMINOPHEN 650 MG: 325 TABLET, FILM COATED ORAL at 00:48

## 2024-10-30 RX ADMIN — OXYCODONE HYDROCHLORIDE 10 MG: 5 TABLET ORAL at 20:15

## 2024-10-30 RX ADMIN — Medication 950 MG: at 08:16

## 2024-10-30 RX ADMIN — OXYCODONE HYDROCHLORIDE 10 MG: 5 TABLET ORAL at 02:22

## 2024-10-30 RX ADMIN — ASPIRIN 81 MG CHEWABLE TABLET 81 MG: 81 TABLET CHEWABLE at 08:16

## 2024-10-30 RX ADMIN — CEFTRIAXONE SODIUM 2 G: 2 INJECTION, POWDER, FOR SOLUTION INTRAMUSCULAR; INTRAVENOUS at 16:29

## 2024-10-30 RX ADMIN — ALBUMIN HUMAN 50 G: 0.25 SOLUTION INTRAVENOUS at 17:39

## 2024-10-30 RX ADMIN — BUMETANIDE 1 MG/HR: 0.25 INJECTION INTRAMUSCULAR; INTRAVENOUS at 13:53

## 2024-10-30 RX ADMIN — METHOCARBAMOL 500 MG: 500 TABLET ORAL at 00:48

## 2024-10-30 RX ADMIN — METOPROLOL SUCCINATE 150 MG: 50 TABLET, EXTENDED RELEASE ORAL at 08:14

## 2024-10-30 RX ADMIN — VALSARTAN 160 MG: 160 TABLET, FILM COATED ORAL at 08:16

## 2024-10-30 RX ADMIN — PANTOPRAZOLE SODIUM 40 MG: 40 TABLET, DELAYED RELEASE ORAL at 08:14

## 2024-10-30 RX ADMIN — SPIRONOLACTONE 50 MG: 25 TABLET, FILM COATED ORAL at 08:14

## 2024-10-30 RX ADMIN — HEPARIN, PORCINE (PF) 10 UNIT/ML INTRAVENOUS SYRINGE 5 ML: at 17:17

## 2024-10-30 RX ADMIN — PREDNISONE 5 MG: 5 TABLET ORAL at 13:52

## 2024-10-30 NOTE — CARE PLAN
[id number]    Neuro: Alert and oriented x4. Forgetful. Hard of hearing.  Cardiovascular: SR. 1 unit of blood transfused (Hgb 6.3 this am). Hemoglobin increased to 7.7. Conditional orders to transfuse if Hgb <7. 3.5L of ascitic fluid removed through paracentesis today. Severe edema throughout.  Respiratory: Room air  GI+: LBM 10/28; declined bowel meds. Continent of bladder; high volume urinary output d/t diuresis with Bumex.   Diet: 2g Na  Mobility: A of 2 with walker and gait belt.  Pain: Generalized bone pain (legs, hips, etc.). Oxycodone 5-10mg q6h available but patient declined analgesia today.  LDAs: Single lumen right chest port now infusing Albumin; left PIV infusing Bumex at 1mg/hr.  Skin: Open sores on buttocks (see [facility] wound care note/order) with Mepilex placed today. Left posterior thigh redness/rash; left anterior leg rash; healing scratch on left foot covered with Primapore; blister on proximal anterior right thigh covered with Primapore. Previously documented weeping areas not noted this shift.  Endo: BS ACHS    General/Changes/Plan: -3.5L ascitic fluid. Ceftriaxone started today for neutrophil count of 579.6 in ascitic fluid. Blood culture results pending. Started Bumex infusion 1mg/hr. 50mg Albumin IV administered. Contact CARDS 1 with concerns.

## 2024-10-30 NOTE — PROCEDURES
[facility]  CAPS PROCEDURE NOTE  Date of Admission:  10/29/2024  Consult Requested by: Cardiology  Reason for Consult: management of symptomatic ascites    Indication/HPI: Abdominal Distension and Ascites    Pre-Procedure Diagnosis: Ascites    Post-Procedure Diagnosis: Ascites    Risk Assessment: Average risk, Technically straightforward; patient's anticoagulation has been held according to guidelines based on the agent and platelets and coags are within guidelines    Procedure Outcome:  Therapeutic paracentesis performed with 3.5 liters of ascites removed.   See additional procedure details below.    The primary covering service should follow up and address any lab results as appropriate.    Archana Fitzpatrick MD  [facility]  Securely message with Vocera (more info)  Text page via [employer] Paging/Directory   See signed in provider for up to date coverage information      [facility]    Procedure: Abdominal paracentesis    Date/Time: 10/30/2024 12:22 PM    Performed by: Archana Fitzpatrick MD  Authorized by: Archana Fitzpatrick MD      UNIVERSAL PROTOCOL   Site Marked: Yes  Prior Images Obtained and Reviewed:  Yes  Required items: Required blood products, implants, devices and special equipment available    Patient identity confirmed:  Verbally with patient and arm band  NA - No sedation, light sedation, or local anesthesia  Confirmation Checklist:  Patient's identity using two indicators, relevant allergies, procedure was appropriate and matched the consent or emergent situation and correct equipment/implants were available  Time out: Immediately prior to the procedure a time out was called    Universal Protocol: the Joint Commission Universal Protocol was followed    Preparation: Patient was prepped and draped in usual sterile fashion       ANESTHESIA    Anesthesia:  Local  infiltration  Local Anesthetic:  Lidocaine 1% without epinephrine  Anesthetic Total (mL):  6      SEDATION    Patient Sedated: No      PROCEDURE    Patient Tolerance:  Patient tolerated the procedure well with no immediate complications  Length of time physician/provider present for 1:1 monitoring during sedation: 0

## 2024-10-30 NOTE — CONSULTS
[facility] Nurse Inpatient Assessment     Consulted for: Wounds posterior thigh and buttocks      Patient History (according to provider note(s):      Gucci Logan is a 74 year old male with a history of HCM, metastatic prostate cancer, cirrhosis d/t chronic HCV infection who presents for heart failure exacerbation     Assessment:      Areas visualized during today's visit: Focused:, Perineal area, Sacrum/coccyx, Lower extremities , Right, and Left    Wound location: Fleshy buttocks              Last photo: 10/30  Wound due to: Friction and edema  Wound history/plan of care: Pt has been seen in clinic for weeping sores on his buttocks and upper thigh ever since he fell and fractured his left hip earlier this year. Pt was subsequently diagnosed with metastatic prostate cancer after he received imaging for his hip fracture. Pt also suffers from heart failure resulting in significant edema in the lower extremities and buttocks. On assessment three open wounds noted to Pt's buttocks. The wounds are not over a bony prominence. Each has an irregular border. The larger sore on the left buttock appears to have been a blister that unroofed as there is some loose epidermis at the edges of the wound base. The left thigh has all in tact epidermis with blanchable erythema. Palpable edema present in all areas.  Wound base: 100 % Dermis     Palpation of the wound bed: normal      Drainage: small     Description of drainage: serous     Measurements (length x width x depth, in cm): 1  x 1.3  x  0.1 cm right buttock, 2 areas  on left buttock 2 x 2 0.1 and 0.5 x 0.5 x 0.1     Tunneling: N/A     Undermining: N/A  Periwound skin: Intact and Edematous      Color: normal and consistent with surrounding tissue and pink      Temperature: normal   Odor: none  Pain: denies , none  Pain interventions prior to dressing change: slow and gentle cares   Treatment goal: Heal , Drainage  "control, Maintain (prevention of deterioration), and Protection  STATUS: initial assessment  Supplies ordered: at bedside and discussed with patient       Treatment Plan:     Buttock wound(s): Every 3 days   Cleanse the area with NS and pat dry.  Apply No sting film barrier to periwound skin.  Cover wound with Sacral Mepilex (#220255)  Change dressing Q 3 days.  Turn and reposition Q 2hrs side to side only.  Ensure pt has Sean-cushion while sitting up in the chair.  FYI- If pt has constant incontinent loose stools needing dressing changes Q shift please discontinue the Mepilex dressing and apply criticaid barrier paste BID and PRN.      Pressure Injury Prevention (PIP) Plan:  If patient is declining pressure injury prevention interventions: Explore reason why and address patient's concerns, Educate on pressure injury risk and prevention intervention(s), If patient is still declining, document \"informed refusal\" , and Ensure Care team is aware ( provider, charge nurse, etc)  Mattress: Follow bed algorithm, add Low Air Loss (Air+) mattress pump if skin is very moist or constantly moist.   HOB: Maintain at or below 30 degrees, unless contraindicated  Repositioning in bed: Every 1-2 hours , Left/right positioning; avoid supine, Raise foot of bed prior to raising head of bed, to reduce patient sliding down (shear), and Frequent microturns using positioning wedges, as patient tolerates  Heels: Pillows under calves  Protective Dressing: Sacral Mepilex for prevention (#736464),  especially for the agitated patient   Positioning Equipment:None  Chair positioning: Chair cushion (#[id number]) , Assist patient to reposition hourly, and Do NOT use a donut for sitting (this increases pressure to smaller area and creates a higher potential for injury)    If patient has a buttock pressure injury, or high risk for PI use chair cushion or SPS.  Moisture Management: Perineal cleansing /protection: Follow Incontinence Protocol, Avoid brief " in bed, Clean and dry skin folds with bathing , Consider InterDry (#554735) between folds, and Moisturize dry skin  Under Devices: Inspect skin under all medical devices during skin inspection , Ensure tubes are stabilized without tension, and Ensure patient is not lying on medical devices or equipment when repositioned  Ask provider to discontinue device when no longer needed.     Orders: Written    RECOMMEND PRIMARY TEAM ORDER: Lymphedema consult  Education provided: importance of repositioning, plan of care, wound progress, Moisture management, and Off-loading pressure  Discussed plan of care with: Patient  WO nurse follow-up plan: weekly  Notify WOC if wound(s) deteriorate.  Nursing to notify the Provider(s) and re-consult the WOC Nurse if new skin concern.    DATA:     Current support surface: Standard  Standard gel mattress (Isoflex) - ordered iosoflex pump for Pt's bed.  Containment of urine/stool: Incontinent pad in bed  BMI: Body mass index is 38.37 kg/m .   Active diet order: Orders Placed This Encounter      2 Gram Sodium Diet     Output: I/O last 3 completed shifts:  In: 760 [P.O.:760]  Out: 2175 [Urine:2175]     Labs:   Recent Labs   Lab 10/30/24  0833 10/30/24  0533 10/29/24  1558   ALBUMIN 2.5*  --   --    HGB 6.8*   < > 7.3*   WBC 1.8*   < > 3.9*   A1C  --   --  5.8*    < > = values in this interval not displayed.     Pressure injury risk assessment:   Sensory Perception: 3-->slightly limited  Moisture: 3-->occasionally moist  Activity: 2-->chairfast  Mobility: 2-->very limited  Nutrition: 2-->probably inadequate  Friction and Shear: 2-->potential problem  Mich Score: 14    Scooter Santos, RN, COCN, CCCN  WO RN Treatment Specialist  Pager no longer is use, please contact through [employer] group: [facility] Nurse [place]    Dept. Office Number: [phone]

## 2024-10-30 NOTE — PROGRESS NOTES
CLINICAL NUTRITION SERVICES - ASSESSMENT NOTE     Nutrition Prescription    RECOMMENDATIONS FOR MDs/PROVIDERS TO ORDER:  Order a multivitamin with minerals to help meet micronutrient needs, especially if inadequate intake is not improving or if pt has a pressure injury.     Malnutrition Status:    Patient does not meet two of the established criteria necessary for diagnosing malnutrition but is at risk for malnutrition. However, unable to fully assess.     Recommendations already ordered by Registered Dietitian (RD):  Oral supplements    Future/Additional Recommendations:  -Rec continue current diet, as ordered. Encourage pt to self-select tolerated foods/beverages. Rec small, frequent meals and use of oral supplements. Encourage a variety of protein sources. Pt may have increased needs for wound healing.  -If warranted, monitor need for fluid restriction.   -Monitor stooling patterns and potential need for bowel regimen.   -H/o vitamin D deficiency. Check a vitmain D lab with a CRP. Vitamin D total lab was 71 on 2/20/2024. Monitor need for supplementation  -Monitor BG control. Hgb A1c of 5.8 (10/29/24). BG was 97 on 10/30.     REASON FOR ASSESSMENT  Gucci Logan is a/an 74 year old male assessed by the dietitian for provider request to see    NUTRITION/ADDITIONAL HISTORY  Pt known to this service from 7/9/2020 admission.     Per Malnutrition screening tool/admission nutrition risk screen on admit:     Have you recently lost weight without trying?: no   Have you been eating poorly because of a decreased appetite?: yes     Unable to obtain nutrition hx. Pt busy during attempts x4 (paracentesis, busy with RN or other staff members).     CURRENT NUTRITION ORDERS  Diet: 2 g Sodium since 10/29.   Intake/Tolerance: Tolerating diet. Per nursing flowsheets (% intake), pt consuming 50% with a poor appetite 10/29    LABS  Labs reviewed    MEDICATIONS  Medications reviewed    ANTHROPOMETRICS  Height: 177.8 cm (5'  "10\")  Most Recent Weight: 121.3 kg (267 lb 6.4 oz)    IBW: 75.5 kg   BMI: Obesity Grade II BMI 35-39.9  Weight History: 111.8 kg (10/10/2023), 115 kg (4/30/2024), 104.8 kg (7/18/2024), 104.8 kg (9/24/2024), 121.3 kg (10/29, admit)- Wt has overall increased, likely due to fluid status. Per provider H & P, hypervolemic on admit.   Dosing Weight: 87 kg (adjusted, based on lowest wt so far this admission of 121.3 kg on 10/29)    ASSESSED NUTRITION NEEDS (for inpatient hospital stay)  Estimated Energy Needs: 8250-3341 kcals/day (20 - 25 kcals/kg)  Justification: Estimated needs with BMI. Estimated needs increase to 25-30 kcals/kg if pt has a pressure injury.  Estimated Protein Needs:  grams protein/day (1 - 1.5 grams of pro/kg)  Justification: Increased needs, possibly chemo, and pending renal function. Estimated needs on the higher end if pt has a pressure injury.  Estimated Fluid Needs: 7084-8386 mL/day (1 mL/kcal)   Justification: Or per provider pending fluid status    PHYSICAL FINDINGS/OTHER FINDINGS  See malnutrition section below.  GI: Last Bowel Movement: 10/28/24. Ascites. Possible paracentesis 10/30. Abdominal discomfort, constipation  WOC: Consulted for potential posterior thighs/buttocks wounds    MALNUTRITION  % Intake: Unable to assess  % Weight Loss: None noted. Difficult to assess wt changes with changes in fluid status.  Subcutaneous Fat Loss: Unable to assess  Muscle Loss: Unable to assess  Fluid Accumulation/Edema: Severe   Malnutrition Diagnosis: Patient does not meet two of the established criteria necessary for diagnosing malnutrition but is at risk for malnutrition. However, unable to fully assess.     NUTRITION DIAGNOSIS  Predicted inadequate nutrient intake (protein-energy) related to increased protein needs.    INTERVENTIONS  Implementation  Discussed with provider.  Ordered Ensure Max at 10:00 and 14:00. Left oral supplement menu with staff member in room.   Left sodium room service menu " "and outlined allocations.     Goals  Patient to consume % of nutritionally adequate meal trays TID, or the equivalent with supplements/snacks.     Monitoring/Evaluation  Progress toward goals will be monitored and evaluated per protocol.         Aylin Quezada, MS, RD, LD, CNSC      No longer available via pager  6C (beds [id number] and [id number]): Vocera \"6C Clinical Dietitian\"   Weekend/Holiday: Vocera \"Weekend Clinical Dietitian\"   "

## 2024-10-30 NOTE — CONSULTS
"Hepatology Consultation  Gucci Logan [id number] 74 year old 1950  10/30/2024        Date of Admission: 10/29/2024  Requesting physician: Dr. 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 Hepatology consulted for \"chronic HCV with ascites, low platelets and albumin. Any further workup/IIP management. \"    #. New Ascites   #. SBP   #. RENNY  -Patient with new onset ascites; diagnostic fluid studies consistent with portal hypertensive ascites 2/2 cirrhosis and cell ct c.w. SBP  -Suspect patient has experienced decompensation 2/2 chemotherapy initiation    #. Decompensated Cirrhosis   Primary Hepatologist: Dr. Renteria   Etiology: HCV/MASLD   MELD 3.0: 11 at 8/21/2024  6:34 AM  MELD-Na: 10 at 8/21/2024  6:34 AM  Calculated from:  Serum Creatinine: 1.04 mg/dL at 8/21/2024  6:34 AM  Serum Sodium: 140 mmol/L (Using max of 137 mmol/L) at 8/21/2024  6:34 AM  Total Bilirubin: 1.1 mg/dL at 8/20/2024  6:06 AM  Serum Albumin: 2.7 g/dL at 8/20/2024  6:06 AM  INR(ratio): 1.32 at 8/19/2024  5:14 PM  Age at listing (hypothetical): 74 years  Sex: Male at 8/21/2024  6:34 AM  Ascites: + clinically.   - PTA 2mg Bumex and 50mg spironolactone  - SBP history: + hx (dx 10/30/24)   Hepatic encephalopathy: PTA lactulose and rifaximin  EV: EGD 5/2023 without varices   TIPS: n/a   PV: Patent on US 8/19/24   HCC: CT A/P 10/18/24 without concerning lesions            Recommendations:   -US RUQ with Doppler   -Follow-up on pending fluid cx; please send ascitic fluid for cytology   -2g IV CTX, plan for 5 days total of therapy before transitioning to lifelong ppx   -50g 25% Albumin today and 11/1/2024   -Diuretics per HF/Cardiology service   -Non-urgent EGD given decompensation       It " "has been a pleasure to participate in the care of this patient.  Patient discussed with GI staff, Dr. Keating.  Please do not hesitate to contact the GI service with any questions or concerns.     Mirian Fatima MD   Gastroenterology Fellow           HPI:   Gucci Logan is a 74 year old male with a history significant for MASLD/HepC s/p IFN/Ribavarin/Boceprevir related compensated cirrhosis, Metastatic Prostate Ca s/p Rad now combination ADT/Docetaxel, HCM c/b HFpEF who was admitted for HF exacerbation. Hepatology consulted for \"chronic HCV with ascites, low platelets and albumin. Any further workup/IIP management. \"    Briefly, patient with established well-compensated ?MASLD/HCV related cirrhosis; he follows with Dr. Dexter BARTON and was last seen in 4/2023. He underwent and EGD in 5/2023 that was w/o varices.     He was diagnosed metastatic prostate ca in 5/2024 in setting of L.Femur pathologic fx; found to have diffuse lymph node involvement as well including gil hepatis, pelvic, and retroperitoneal LN. He underwent L.Femur Rx 7/29-8/2/2024 and subsequently transitioned to ADT(7/29-)/Docataxel (C1D1 8/14/24). He has experienced progressively worsening thrombocytopenia since tx initiation. He notes that he has been having ~2-3 weeks of increased abdominal distension, bloating, and LE edema; he remains adherent on home 2mg Bumex +50mg Aldactone initiated for optimization of his HF.          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    Diverticular disease of colon 07/14/2015    Esophageal reflux 03/01/2014    Gastroesophageal reflux disease, esophagitis presence not specified 10/06/2016    IMO Regulatory Load OCT 2020      Glaucoma suspect of both eyes 04/30/2024    Hearing problem     Hiatal hernia     Hyperlipidemia LDL goal <100 04/19/2017    Hypertension     Hypertension goal BP (blood pressure) < 140/90 02/08/2013    Intertrochanteric fracture of left femur, closed, initial encounter (H) 05/19/2024    Jaundice 05/31/2008    Liver disease     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 Not keen to use it.     Stage 3 chronic kidney disease, unspecified whether stage 3a or 3b CKD (H) 01/23/2024    Syncope, unspecified syncope type 02/20/2023    Thrombocytopenia (H) 08/28/2008    Overview:   8/28/2008, attributed to liver disease with portal hypertension and functional splenomegaly              Past Surgical History:   Reviewed and edited as appropriate   Past Surgical History:   Procedure Laterality Date    ARTHROSCOPY KNEE RT/LT      (L) with partial medial meniscectomy    CATARACT IOL, RT/LT  Nov and Dec 2017    COLONOSCOPY N/A 4/24/2019    Procedure: COLONOSCOPY, WITH POLYPECTOMY AND BIOPSY;  Surgeon: Leventhal, Thomas Michael, MD;  Location: [facility]    COLONOSCOPY N/A 9/14/2022    Procedure: COLONOSCOPY;  Surgeon: Rafa Renee MD;  Location: [facility]    DACRYOCYSTORHINOSTOMY Left 10/16/2018     Procedure: DACRYOCYSTORHINOSTOMY;  Surgeon: Madhu Krause MD;  Location: [facility]    ENDOSCOPIC ENDONASAL SURGERY  1994    ENDOSCOPY  2-19-15    ESOPHAGOSCOPY, GASTROSCOPY, DUODENOSCOPY (EGD), COMBINED N/A 4/24/2019    Procedure: COMBINED ESOPHAGOSCOPY, GASTROSCOPY, DUODENOSCOPY (EGD) - hold aspirin ibuprofen or naproxen for one week prior (per physician order);  Surgeon: Leventhal, Thomas Michael, MD;  Location: [facility]    ESOPHAGOSCOPY, GASTROSCOPY, DUODENOSCOPY (EGD), COMBINED N/A 5/23/2023    Procedure: Esophagoscopy, gastroscopy, duodenoscopy, combined;  Surgeon: Rafa Renee MD;  Location: [facility]    EYE SURGERY      Hernia surgery Left 1994    NASAL/SINUS POLYPECTOMY      OPEN REDUCTION INTERNAL FIXATION HIP NAILING Left 5/20/2024    Procedure: open reduction internal fixation hip nailing left;  Surgeon: Rafa Wagner MD;  Location: [facility]    REPAIR PTOSIS Left 10/16/2018    Procedure: LEFT UPPER LID PTOSIS AND BILATERAL  BROW PTOSIS REPAIR WITH LEFT DACRYOCYSTORHINOSTOMY ;  Surgeon: Madhu Krause MD;  Location: [facility]    REPAIR PTOSIS BROW Bilateral 10/16/2018    Procedure: REPAIR PTOSIS BROW;  Surgeon: Madhu Krause MD;  Location: [facility]    ROTATOR CUFF REPAIR RT/LT Right     ZZC OPEN RX ANKLE DISLOCATN+FIXATN      (R)    ZZC SPINAL FUSION,ANT,EA ADNL LEVEL      T12 - L1            Medications:     Medications Prior to Admission   Medication Sig Dispense Refill Last Dose/Taking    acetaminophen (TYLENOL) 325 MG tablet Take 2 tablets (650 mg) by mouth every 4 hours as needed for other (For optimal non-opioid multimodal pain management to improve pain control.) 100 tablet 0 Past Week    aspirin 81 MG EC tablet Take 1 tablet (81 mg) by mouth daily 90 tablet 3 10/29/2024    bumetanide (BUMEX) 2 MG tablet Take 1 tablet (2 mg) by mouth daily. 30 tablet 0 10/29/2024    calcium citrate (CITRACAL) 950 (200 Ca) MG tablet Take 1 tablet (950 mg) by mouth daily 120 tablet 3 10/29/2024    darolutamide  (NUBEQA) 300 MG tablet Take 2 tablets (600 mg) by mouth 2 times daily for 21 days. . Swallow tablets whole. Take with food. Avoid grapefruit or grapefruit juice. 84 tablet 0 10/29/2024 at  8:00 AM    diclofenac (VOLTAREN) 1 % topical gel Apply 4 g topically 3 times daily as needed for moderate pain (bursitis) 150 g 1 Past Month    metFORMIN (GLUCOPHAGE) 500 MG tablet Take 1 tablet (500 mg) by mouth 2 times daily (with meals). 180 tablet 1 10/29/2024    methocarbamol (ROBAXIN) 500 MG tablet Take 1 Tablet (500 mg) by mouth every 6 hours if needed for Muscle Spasm PO 1st choice.   More than a month    metoprolol succinate ER (TOPROL XL) 100 MG 24 hr tablet 1 1/2 ( 150 mg ) po daily 135 tablet 1 10/29/2024    multivitamin w/minerals (THERA-VIT-M) tablet Take 1 tablet by mouth daily   10/29/2024    omeprazole (PRILOSEC) 20 MG DR capsule TAKE 1 CAPSULE BY MOUTH ONCE DAILY 30 TO 60 MINUTES BEFORE A MEAL 90 capsule 1 10/29/2024    oxyCODONE (ROXICODONE) 5 MG tablet Take 1-2 tablets (5-10 mg) by mouth every 6 hours as needed for pain. 70 tablet 0 Past Week    predniSONE (DELTASONE) 5 MG tablet Take 1 tablet (5 mg) by mouth daily (with breakfast) Do not take prednisone on days when taking dexamethasone. 60 tablet 1 10/29/2024    tiZANidine (ZANAFLEX) 4 MG tablet TAKE 1/2 (ONE-HALF) TO 1  TABLET BY MOUTH UP TO THREE TIMES DAILY AS NEEDED FOR MUSCLE PAIN 40 tablet 0 More than a month    traMADol (ULTRAM) 50 MG tablet Take 50 mg by mouth daily as needed for severe pain.   Past Week    valsartan (DIOVAN) 160 MG tablet Take 1 tablet (160 mg) by mouth daily 90 tablet 1 10/29/2024    vitamin D3 (CHOLECALCIFEROL) 50 mcg (2000 units) tablet Take 1 tablet (50 mcg) by mouth daily 120 tablet 3 10/29/2024            Allergies      Allergies   Allergen Reactions    Bee Venom Swelling     Gum swelling    Haemophilus B Polysaccharide Vaccine Other (See Comments)     PN: delirium  fever    Haemophilus Influenzae Nausea and Other (See  Comments)     PN: delirium  fever    Other Reaction(s): Fever    PN: delirium  fever   PN: delirium  fever    Pneumococcal Vaccine      Other Reaction(s): *Unknown, adverse reaction           Family History:   Reviewed and edited as appropriate  Family History   Problem Relation Age of Onset    Alzheimer Disease Mother 85    Dementia Mother     Cerebrovascular Disease Father 50    Cancer Father     Hypertension Father     Neurologic Disorder No family hx of     Diabetes No family hx of     No known history of colorectal cancer, liver disease, or inflammatory bowel disease.         Review of Systems:   A complete 10 point review of systems was obtained.  Please see the HPI for pertinent positives and negatives.  All other systems were reviewed and were found to be negative.          Physical Exam:   VS:  /43   Pulse 77   Temp 98  F (36.7  C) (Oral)   Resp 13   Wt 121.3 kg (267 lb 6.4 oz)   SpO2 95%   BMI 38.37 kg/m      Wt:   Wt Readings from Last 2 Encounters:   10/29/24 121.3 kg (267 lb 6.4 oz)   10/24/24 123.4 kg (272 lb 1.6 oz)      Constitutional: cooperative, pleasant, no acute distress  Eyes: Conjunctiva anicteric  HEENT: Normal oropharynx without ulcers or exudate, mucus membranes moist  CV:+2+ Nicole edema  Respiratory: Breathing comfortably on ambient air  Abd: dIstended, +bs, nt; +shifting dullness   Skin: warm, perfused, no jaundice  Neuro: A&O x 3, No asterixis         Laboratory:   BMP  Recent Labs   Lab 10/30/24  1036 10/30/24  0533 10/29/24  1558 10/24/24  0650   NA  --  142 141 140   POTASSIUM  --  3.9 4.4 4.6   CHLORIDE  --  111* 110* 113*   SOLEDAD  --  7.8* 8.0* 8.5*   CO2  --  22 19* 21*   BUN  --  60.1* 59.5* 29.0*   CR  --  1.74* 1.69* 1.26*   * 97 109* 105*     CBC  Recent Labs   Lab 10/30/24  0833 10/30/24  0533 10/29/24  1558 10/24/24  0650   WBC 1.8* 2.0* 3.9* 7.2   RBC 1.98* 1.88* 2.12* 2.46*   HGB 6.8* 6.3* 7.3* 8.2*   HCT 21.0* 19.9* 22.9* 25.7*   * 106* 108* 105*    MCH 34.3* 33.5* 34.4* 33.3*   MCHC 32.4 31.7 31.9 31.9   RDW 21.2* 21.4* 21.5* 22.6*   PLT 29* 29* 34* 75*     INRNo lab results found in last 7 days.  Liver Enzymes  Recent Labs   Lab 10/30/24  0833 10/24/24  0650   ALKPHOS 124 148   AST 26 32   ALT 13 21   BILITOTAL 0.9 0.8   PROTTOTAL 4.5* 5.2*   ALBUMIN 2.5* 2.8*      PANCNo lab results found in last 7 days.    Imaging/Procedures/Studies:

## 2024-10-30 NOTE — PROGRESS NOTES
Cardiology Progress Note      Assessment & Plan:  Gucci Logan is a 74 year old male with a history of HCM, metastatic prostate cancer, cirrhosis d/t chronic HCV infection who presents for heart failure exacerbation.    Today's Updates:  - Paracentesis with CAP's team  - Oncology and Hepatology consult  - IV Rocephin + Albumin for SBP per hepatology recs  - 1uPRBC  - IV Bumex bolus + infusion  - WOC consult  - Lymphedema consult    # HCM with EDGAR with valsalve LVOTO gradient   # Acute on chronic heart failure with preserved ejection fraction  # HTN  Patient recently seen in [facility] for enrollment where he was found to be grossly hypervolemic. Patient was reluctant to start loop diuretic but agreeable to spironolactone. Trialed PO Bumex starting 10/28 with minimal improvement, admitted for IV diuresis.   - Repeat TTE with worsening LVOT gradient  - Volume status: grossly hypervolemic. Patient states a 'good' weight for him is 230 lbs, though does not have scale at home. Weight on admission 272 lbs. IV Bumex 4 mg X 1 again, likely place on gtt today. Goal NN 2 L do not want to diurese too quickly with HCM and LVOT  - Continue metoprolol succinate 150 mg daily  - On Valsartan 160 mg daily- holding with low BP, worsening LVOT gradient  - Avoid vasodilators  - Ranolazine was stopped d/t no improvement of his symptoms  - Dr. Gonsales office working on getting a myosin inhibitor for him as his symptoms   - Has been recommended to do genetic counseling previously  - K > 4, Mg > 2, RN protocols ordered  - Lymphedema consult  - BID BMP with aggressive diuresis  - Strict I&O's  - Daily Standing weights    # Metastatic prostate cancer   # Pancytopenia  # Anemia  - Follows with oncology OP, consulted here for guidance on continuing chemotherapy while admitted and coagulopathy  - WBC Differential   - Hgb 6.8 -> 1uPRBC  - Repeat CBC 1 hour post transfusion and daily     # Cirrhosis d/t chronic HCV infection  #  Ascites  # Thrombocytopenia  # Hypoalbuminemia   # Spontaneous bacterial pertonitis  - Management per outpatient liver team   - CAPS team for paracentesis: 3.5 L removed 10/30   Ascites fluid hazy with + ANC   Blood cultures X 1 then start Rocephin 2 g until cultures  - LFTs ordered   - Hepatology consult  - consider platelet transfuse if lower then 20,000     # CKD stage 3  - Cr up to 1.74 (1.69) today, likely cardiorenal  - Diuretic management as above  - BMP daily     # Type II Diabetes Mellitus  # Class II obesity  - Updating A1C this admission  - Hold PTA Metformin  - medium intensity sliding scale insulin with hypoglycemia protocol    FEN: Regular  Code status: FULL   Prophylaxis:  ambulation  Isolation: Contact  Disposition: 5+ days     Patient discussed w/ Dr. Yepez, who agrees with above plan.    Medically Ready for Discharge: Anticipated in 5+ Days      Jaime Mccabe NP Student  DIANE Manjarrez, CNP  [facility]  General Cardiology  Vocera     Medical Decision Making       45 MINUTES SPENT BY ME on the date of service doing chart review, history, exam, documentation & further activities per the note.        I have seen, interviewed, and examined patient. I have reviewed the laboratory tests, imaging, and other investigations. I have reviewed the management plan with the patient. I discussed with the team and agree with the findings and plan in this resident/fellow/nurse practitioner's note. In addition, changes in the physical examination, assessment and plan have been incorporated into the note by myself, as to make it a single cohesive document.     My decision today: Continue current medical treatment      Tigist Yepez MD, MS  Cardiology/Cardiac EP Attending Staff       Interval History:  Patient seen in bed. Patient denies chest pain or SOB. Stated that he has just been gaining weight over the past couple of weeks. Patient stated that he believes he is peeing off more  fluid then when he was at home. Patient voiced continued weeping from lower extremities.  Most recent vital signs:  /47 (BP Location: Right arm, Patient Position: Supine, Cuff Size: Adult Regular)   Pulse 77   Temp 98  F (36.7  C) (Oral)   Resp 18   Wt 121.3 kg (267 lb 6.4 oz)   SpO2 95%   BMI 38.37 kg/m    Temp:  [98  F (36.7  C)-98.5  F (36.9  C)] 98  F (36.7  C)  Pulse:  [77-85] 77  Resp:  [18] 18  BP: ()/(46-51) 105/47  Cuff Mean (mmHg):  [66] 66  SpO2:  [94 %-97 %] 95 %  Wt Readings from Last 2 Encounters:   10/29/24 121.3 kg (267 lb 6.4 oz)   10/24/24 123.4 kg (272 lb 1.6 oz)       Intake/Output Summary (Last 24 hours) at 10/30/2024 0739  Last data filed at 10/30/2024 0712  Gross per 24 hour   Intake 760 ml   Output 2500 ml   Net -1740 ml       Physical exam:  General: Pleasant elderly male. Appears comfortable and in no acute distress. Alert and interactive  HEENT: Normocephalic, atraumatic. No scleral icterus or injection  Neck: JVP elevated to mandible.  CARDIAC: Regular rate and rhythm, with +3 holosystolic murmur best appreciated over the apex. . Peripheral pulses 2+  RESP: Normal work of breathing on room air without use of accessory breathing muscles. Clear to auscultation in all fields. No wheezes, rhonchi or crackles appreciated.  GI: Abdominal distention present. Soft and nontender. Tympanic to percussion.  EXTREMITIES: +4 lower extremity edema extending to mid abdomen which is +3. No cyanosis or clubbing. Warm and well perfused. No venous stasis changes.   SKIN: No acute lesions appreciated. Warm and dry to touch  NEURO: Alert and oriented X3, CN II-XII grossly intact, no focal neurological deficits noted, normal speech  PSYCH: Mood and affect are appropriate    Labs (Past three days):  CBC  Recent Labs   Lab 10/30/24  0533 10/29/24  1558 10/24/24  0650   WBC 2.0* 3.9* 7.2   RBC 1.88* 2.12* 2.46*   HGB 6.3* 7.3* 8.2*   HCT 19.9* 22.9* 25.7*   * 108* 105*   MCH 33.5* 34.4*  33.3*   MCHC 31.7 31.9 31.9   RDW 21.4* 21.5* 22.6*   PLT 29* 34* 75*     BMP  Recent Labs   Lab 10/30/24  0533 10/29/24  1558 10/24/24  0650    141 140   POTASSIUM 3.9 4.4 4.6   CHLORIDE 111* 110* 113*   CO2 22 19* 21*   ANIONGAP 9 12 6*   GLC 97 109* 105*   BUN 60.1* 59.5* 29.0*   CR 1.74* 1.69* 1.26*   GFRESTIMATED 41* 42* 60*   SOLEDAD 7.8* 8.0* 8.5*     Troponins:   Lab Results   Component Value Date    TROPI <0.015 07/08/2020        INRNo lab results found in last 7 days.  Liver panel  Recent Labs   Lab 10/24/24  0650   PROTTOTAL 5.2*   ALBUMIN 2.8*   BILITOTAL 0.8   ALKPHOS 148   AST 32   ALT 21       Imaging/procedure results:  EKG 12 Lead 8/19/24:       Echocardiogram 8/19/24:  Interpretation Summary  Known hypertrophic cardiomyopathy with asymmetric septal hypertrophy  phenotype.  Global and regional left ventricular function is normal with an EF of 60-65%.  There is asymmetric septal hypertrophy. The maximal wall thickness is 2.6 cm  in the basal anteroseptal segment. There is a resting LVOT gradient of 19 mmHg  that increases to 72 mmHg with Valsalva.  Global right ventricular function is normal. The right ventricle is normal  size.  There is systolic anterior motion of the mitral valve without septal contact.  Mild mitral regurgitation.  IVC diameter <2.1 cm collapsing >50% with sniff suggests a normal RA pressure  of 3 mmHg.  This study was compared with the study from 12/04/2023. No significant changes  noted.     NM Lexiscan stress test 9/12/23:    The nuclear stress test is negative for inducible myocardial ischemia or infarction.    Left ventricular function is normal.    The left ventricular ejection fraction at rest is 75%.  The left ventricular ejection fraction at stress is 69%.    LV cavity size normal.    Stress to rest cavity ratio is 1.05.  TID is absent.    There is no prior study for comparison.

## 2024-10-31 ENCOUNTER — APPOINTMENT (OUTPATIENT)
Dept: OCCUPATIONAL THERAPY | Facility: CLINIC | Age: 74
End: 2024-10-31
Attending: NURSE PRACTITIONER
Payer: COMMERCIAL

## 2024-10-31 LAB
ALBUMIN SERPL BCG-MCNC: 2.7 G/DL (ref 3.5–5.2)
ALP SERPL-CCNC: 113 U/L (ref 40–150)
ALT SERPL W P-5'-P-CCNC: 12 U/L (ref 0–70)
ANION GAP SERPL CALCULATED.3IONS-SCNC: 9 MMOL/L (ref 7–15)
ANION GAP SERPL CALCULATED.3IONS-SCNC: 9 MMOL/L (ref 7–15)
AST SERPL W P-5'-P-CCNC: 25 U/L (ref 0–45)
BILIRUB DIRECT SERPL-MCNC: 0.35 MG/DL (ref 0–0.3)
BILIRUB SERPL-MCNC: 1.1 MG/DL
BLD PROD TYP BPU: NORMAL
BLOOD COMPONENT TYPE: NORMAL
BUN SERPL-MCNC: 54.2 MG/DL (ref 8–23)
BUN SERPL-MCNC: 57.1 MG/DL (ref 8–23)
CALCIUM SERPL-MCNC: 8.3 MG/DL (ref 8.8–10.4)
CALCIUM SERPL-MCNC: 8.3 MG/DL (ref 8.8–10.4)
CHLORIDE SERPL-SCNC: 104 MMOL/L (ref 98–107)
CHLORIDE SERPL-SCNC: 107 MMOL/L (ref 98–107)
CODING SYSTEM: NORMAL
CREAT SERPL-MCNC: 1.63 MG/DL (ref 0.67–1.17)
CREAT SERPL-MCNC: 1.69 MG/DL (ref 0.67–1.17)
CROSSMATCH: NORMAL
EGFRCR SERPLBLD CKD-EPI 2021: 42 ML/MIN/1.73M2
EGFRCR SERPLBLD CKD-EPI 2021: 44 ML/MIN/1.73M2
ERYTHROCYTE [DISTWIDTH] IN BLOOD BY AUTOMATED COUNT: 23.4 % (ref 10–15)
GLUCOSE BLDC GLUCOMTR-MCNC: 153 MG/DL (ref 70–99)
GLUCOSE BLDC GLUCOMTR-MCNC: 163 MG/DL (ref 70–99)
GLUCOSE BLDC GLUCOMTR-MCNC: 172 MG/DL (ref 70–99)
GLUCOSE BLDC GLUCOMTR-MCNC: 180 MG/DL (ref 70–99)
GLUCOSE BLDC GLUCOMTR-MCNC: 95 MG/DL (ref 70–99)
GLUCOSE SERPL-MCNC: 164 MG/DL (ref 70–99)
GLUCOSE SERPL-MCNC: 95 MG/DL (ref 70–99)
HCO3 SERPL-SCNC: 27 MMOL/L (ref 22–29)
HCO3 SERPL-SCNC: 27 MMOL/L (ref 22–29)
HCT VFR BLD AUTO: 21.2 % (ref 40–53)
HGB BLD-MCNC: 6.9 G/DL (ref 13.3–17.7)
HGB BLD-MCNC: 8.2 G/DL (ref 13.3–17.7)
ISSUE DATE AND TIME: NORMAL
MAGNESIUM SERPL-MCNC: 1.4 MG/DL (ref 1.7–2.3)
MAGNESIUM SERPL-MCNC: 2.1 MG/DL (ref 1.7–2.3)
MCH RBC QN AUTO: 32.9 PG (ref 26.5–33)
MCHC RBC AUTO-ENTMCNC: 32.5 G/DL (ref 31.5–36.5)
MCV RBC AUTO: 101 FL (ref 78–100)
PLATELET # BLD AUTO: 36 10E3/UL (ref 150–450)
POTASSIUM SERPL-SCNC: 3.6 MMOL/L (ref 3.4–5.3)
POTASSIUM SERPL-SCNC: 4.2 MMOL/L (ref 3.4–5.3)
PROT SERPL-MCNC: 4.5 G/DL (ref 6.4–8.3)
RBC # BLD AUTO: 2.1 10E6/UL (ref 4.4–5.9)
SODIUM SERPL-SCNC: 140 MMOL/L (ref 135–145)
SODIUM SERPL-SCNC: 143 MMOL/L (ref 135–145)
UNIT ABO/RH: NORMAL
UNIT NUMBER: NORMAL
UNIT STATUS: NORMAL
UNIT TYPE ISBT: 6200
WBC # BLD AUTO: 2.9 10E3/UL (ref 4–11)

## 2024-10-31 PROCEDURE — 85018 HEMOGLOBIN: CPT

## 2024-10-31 PROCEDURE — 250N000011 HC RX IP 250 OP 636: Performed by: NURSE PRACTITIONER

## 2024-10-31 PROCEDURE — 250N000011 HC RX IP 250 OP 636: Performed by: INTERNAL MEDICINE

## 2024-10-31 PROCEDURE — 80053 COMPREHEN METABOLIC PANEL: CPT | Performed by: NURSE PRACTITIONER

## 2024-10-31 PROCEDURE — 99418 PROLNG IP/OBS E/M EA 15 MIN: CPT

## 2024-10-31 PROCEDURE — 97530 THERAPEUTIC ACTIVITIES: CPT | Mod: GO

## 2024-10-31 PROCEDURE — 85014 HEMATOCRIT: CPT | Performed by: NURSE PRACTITIONER

## 2024-10-31 PROCEDURE — P9016 RBC LEUKOCYTES REDUCED: HCPCS | Performed by: NURSE PRACTITIONER

## 2024-10-31 PROCEDURE — 82435 ASSAY OF BLOOD CHLORIDE: CPT | Performed by: NURSE PRACTITIONER

## 2024-10-31 PROCEDURE — 82248 BILIRUBIN DIRECT: CPT | Performed by: NURSE PRACTITIONER

## 2024-10-31 PROCEDURE — 82565 ASSAY OF CREATININE: CPT | Performed by: NURSE PRACTITIONER

## 2024-10-31 PROCEDURE — 250N000013 HC RX MED GY IP 250 OP 250 PS 637: Performed by: NURSE PRACTITIONER

## 2024-10-31 PROCEDURE — 85041 AUTOMATED RBC COUNT: CPT | Performed by: NURSE PRACTITIONER

## 2024-10-31 PROCEDURE — 82947 ASSAY GLUCOSE BLOOD QUANT: CPT | Performed by: NURSE PRACTITIONER

## 2024-10-31 PROCEDURE — 120N000005 HC R&B MS OVERFLOW UMMC

## 2024-10-31 PROCEDURE — 250N000013 HC RX MED GY IP 250 OP 250 PS 637: Performed by: INTERNAL MEDICINE

## 2024-10-31 PROCEDURE — 83735 ASSAY OF MAGNESIUM: CPT | Performed by: INTERNAL MEDICINE

## 2024-10-31 PROCEDURE — 97140 MANUAL THERAPY 1/> REGIONS: CPT | Mod: GO

## 2024-10-31 PROCEDURE — 250N000012 HC RX MED GY IP 250 OP 636 PS 637: Performed by: NURSE PRACTITIONER

## 2024-10-31 PROCEDURE — 99232 SBSQ HOSP IP/OBS MODERATE 35: CPT | Mod: GC | Performed by: STUDENT IN AN ORGANIZED HEALTH CARE EDUCATION/TRAINING PROGRAM

## 2024-10-31 PROCEDURE — 97165 OT EVAL LOW COMPLEX 30 MIN: CPT | Mod: GO

## 2024-10-31 PROCEDURE — 99233 SBSQ HOSP IP/OBS HIGH 50: CPT | Mod: FS | Performed by: CASE MANAGER/CARE COORDINATOR

## 2024-10-31 PROCEDURE — 99233 SBSQ HOSP IP/OBS HIGH 50: CPT

## 2024-10-31 RX ORDER — MAGNESIUM SULFATE HEPTAHYDRATE 40 MG/ML
4 INJECTION, SOLUTION INTRAVENOUS ONCE
Status: COMPLETED | OUTPATIENT
Start: 2024-10-31 | End: 2024-10-31

## 2024-10-31 RX ORDER — POTASSIUM CHLORIDE 750 MG/1
20 TABLET, EXTENDED RELEASE ORAL ONCE
Status: COMPLETED | OUTPATIENT
Start: 2024-10-31 | End: 2024-10-31

## 2024-10-31 RX ORDER — MAGNESIUM OXIDE 400 MG/1
400 TABLET ORAL EVERY 4 HOURS
Status: DISCONTINUED | OUTPATIENT
Start: 2024-10-31 | End: 2024-10-31

## 2024-10-31 RX ADMIN — MAGNESIUM SULFATE IN WATER 4 G: 40 INJECTION, SOLUTION INTRAVENOUS at 10:51

## 2024-10-31 RX ADMIN — METOPROLOL SUCCINATE 150 MG: 50 TABLET, EXTENDED RELEASE ORAL at 09:13

## 2024-10-31 RX ADMIN — PANTOPRAZOLE SODIUM 40 MG: 40 TABLET, DELAYED RELEASE ORAL at 09:13

## 2024-10-31 RX ADMIN — OXYCODONE HYDROCHLORIDE 5 MG: 5 TABLET ORAL at 22:26

## 2024-10-31 RX ADMIN — OXYCODONE HYDROCHLORIDE 10 MG: 5 TABLET ORAL at 01:34

## 2024-10-31 RX ADMIN — INSULIN ASPART 1 UNITS: 100 INJECTION, SOLUTION INTRAVENOUS; SUBCUTANEOUS at 18:30

## 2024-10-31 RX ADMIN — POTASSIUM CHLORIDE 20 MEQ: 750 TABLET, EXTENDED RELEASE ORAL at 09:12

## 2024-10-31 RX ADMIN — HEPARIN, PORCINE (PF) 10 UNIT/ML INTRAVENOUS SYRINGE 5 ML: at 15:58

## 2024-10-31 RX ADMIN — Medication 950 MG: at 09:12

## 2024-10-31 RX ADMIN — OXYCODONE HYDROCHLORIDE 5 MG: 5 TABLET ORAL at 15:59

## 2024-10-31 RX ADMIN — ASPIRIN 81 MG CHEWABLE TABLET 81 MG: 81 TABLET CHEWABLE at 09:12

## 2024-10-31 RX ADMIN — PREDNISONE 5 MG: 5 TABLET ORAL at 09:13

## 2024-10-31 RX ADMIN — OXYCODONE HYDROCHLORIDE 10 MG: 5 TABLET ORAL at 10:44

## 2024-10-31 RX ADMIN — SPIRONOLACTONE 50 MG: 25 TABLET, FILM COATED ORAL at 09:13

## 2024-10-31 RX ADMIN — OXYCODONE HYDROCHLORIDE 5 MG: 5 TABLET ORAL at 22:21

## 2024-10-31 RX ADMIN — CEFTRIAXONE SODIUM 2 G: 2 INJECTION, POWDER, FOR SOLUTION INTRAMUSCULAR; INTRAVENOUS at 15:36

## 2024-10-31 NOTE — PLAN OF CARE
Neuro: A&Ox4. Intermittently forgetful.   Cardiac: SR. VSS. BP low side-team made aware, Bumex drip stopped-BP has been better. Blood transfusion started without any transfusion reaction, legs raised. Checking BP q 15mins. Patients said he is feels some vertigo but said it is not new to him.   BP (!) 85/38 (BP Location: Right arm, Cuff Size: Adult Regular)   Pulse 72   Temp 98.6  F (37  C) (Oral)   Resp (!) 47   Wt 118.1 kg (260 lb 4.8 oz)   SpO2 99%   BMI 37.35 kg/m     Respiratory: Sating well on RA. Denies SOB at rest.   GI/: Adequate urine output, uses the urinal.   Diet/appetite: Tolerating diet.   Activity:  Assist of 2 with GB and walker, up to the recliner.   Pain: buttocks pain, refused to use chair cushion and he feels better. Oxycodone given with some relief.   Skin: BLE lymph wraps c/o OT. Wounds on Buttocks/coccyx with Mepi in place CDI.   LDA's: Right port-A cath CDI, Left PIV CDI.     Plan: Continue with POC. Notify primary team with changes.

## 2024-10-31 NOTE — CONSULTS
Brief Hem/Onc Note    Mr. Logan was seen by our team briefly yesterday. Says that he feels much better after paracentesis but is hopeful to diurese some of the edema from his legs.     He can continue on daralutamide while admitted. Follow up is scheduled with Dr. Plunkett on 11/14. If he remains in the hospital past this date please reach out to us to assist with scheduling. Otherwise we will sign off. Please don't hesitate to reach out to us if additional questions come up during the hospitalization.     Brendan Orourke  Fellow PGY-4  Hematology and Oncology

## 2024-10-31 NOTE — PLAN OF CARE
Neuro: A&Ox4. Occasionally forgetful, calls appropriately. Endorsed some dizziness and vertigo with movement   Cardiac: SR. Soft pressures with MAPs between 57-65, bumex gtt paused with some increase in pressures   Respiratory: Sating > 92% on RA. Shortness of breath with activity.   GI/: Adequate urine output using urinal. Last BM 10/28, declined bowel meds  Diet/appetite: Tolerating 2 gm Na diet. Fair appetite.  Activity:  Assist of 2-3 w/ walker and gait belt. Able to turn self and weight shift in bed  Pain: PRN oxy given for hip and leg pain  Skin: See flowsheets, multiple areas of skin breakdown  LDA's: L PIV SL, port SL   Labs: hbg 6.9, 1 unit PRBCs ordered. Mg and K to be replaced orally with recheck in AM    Plan: continue to diurese; increase activity as able; Continue with POC. Notify primary team with changes.      Goal Outcome Evaluation:      Plan of Care Reviewed With: patient    Overall Patient Progress: improving    Outcome Evaluation: Good urine output, bumex paused at 2230 for low BP. pain managed well with oxy

## 2024-10-31 NOTE — PROGRESS NOTES
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      # New Ascites complicated by SBP   # RENNY (baseline Cr 1)  Patient with new onset ascites; diagnostic fluid studies consistent with portal hypertensive ascites 2/2 cirrhosis and cell ct c.w. SBP. Ceftriaxone 2 g daily started 10/30/24, s/p one time albumin 25% 50 g for SBP. Gram stain, culture, and cytology not collected with 10/30 paracentesis. Cr still elevated at 1.7, from baseline 1. Currently on diuretics for ADHF, however echo showed hyperkinetic - potentially from cirrhosis. Given creatinine above baseline and SBP, would recommend holding off on diuresis and keeping goal I/O of balance. Patient also has episodes of hypotension 80/40, would recommend colloid to promote renal perfusion with either 25% albumin or PRC given anemia.      #. Decompensated Cirrhosis   Primary Hepatologist: Dr. Renteria   Etiology: HCV/MASLD   Ascites: + clinically.   - hold PTA 2mg Bumex and 50mg spironolactone  - SBP history: + hx (dx 10/30/24)   Hepatic encephalopathy: PTA lactulose and rifaximin  EV: EGD 5/2023 without varices   TIPS: n/a   PV: Patent on US 8/19/24   HCC: CT A/P 10/18/24 without concerning lesions       RECOMMENDATIONS:  - recommend holding off on diuresis and keeping goal I/O of balance  - recommend colloid to promote renal perfusion with either 25% albumin 50 g or PRC given anemia. If receiving PRC today, would hold off on  albumin  - hold off paracentesis today, reassess clinical and kidney function in the coming days. At the next paracentesis, would recommend G/S, C/S, and cytology.   - continue ceftriaxone 2 g daily for SBP    Thank you for involving us in this patient's care. Please do not hesitate to contact the GI service with any questions or concerns.     The patient was discussed and plan agreed upon with Dr. Stout, GI Hepatology Service staff physician.    Marija East MD  Inpatient Gastroenterology Service  [facility]        Physician Attestation   I saw this patient with the resident and agree with the resident/fellow's findings and plan of care as documented in the note.        Please see A&P for additional details of medical decision making.    I have personally reviewed the following data over the past 24 hrs:    2.9 (L)  \   6.9 (LL)   / 36 (LL)     143 107 57.1 (H) /  163 (H)   3.6 27 1.69 (H) \     ALT: 12 AST: 25 AP: 113 TBILI: 1.1   ALB: 2.7 (L) TOT PROTEIN: 4.5 (L) LIPASE: N/A         Gala Stout MD  Date of Service (when I saw the patient): 10/31/24    _______________________________________________________________      Subjective: Nursing notes and 24hr events reviewed.     Patient seen and examined at [facility]. Patient reports abdominal pain on Rt side. Paracentesis done at Lt side. Reports increased comfort following paracentesis yesterday, and wanting repeat drainage. Denied bleeding/hematoma. Has some pain on Rt leg rash. Denied fever/chills.     ROS:   4 pt ROS negative unless noted in subjective.     Objective:  Blood pressure (!) 150/123, pulse 82, temperature 98.4  F (36.9  C), temperature source Oral, resp. rate 16, weight 118.1 kg (260 lb 4.8 oz), SpO2 96%.    General: male sitting up in chair. Appears comfortable.  Answers appropriately.    Lungs: on RA, comfortable  Abdomen: distended, Soft, reported tenderness on Rt>Lt side, slight erythema at Lt abdomen, no  hematoma, BS +. No peritoneal signs.  Extremities: pitting edema 1+, erythematous rash Rt anterior leg  Musculoskeletal: No gross deformity.  Skin: No jaundice or rash on exposed skin.  Neurologic: Grossly non-focal.  CN 2-12 grossly intact.   Mental status/Psych: A&O. Asks/answers questions appropriately. Pleasant, interactive.    Date 10/31/24 0700 - 11/01/24 0659   Shift 3834-2485 0107-4774 7338-8784 24 Hour Total   INTAKE   I.V. 36.13   36.13   Shift Total(mL/kg) 36.13(0.31)   36.13(0.31)   OUTPUT   Urine 725   725   Shift Total(mL/kg) 725(6.14)   725(6.14)   Weight (kg) 118.07 118.07 118.07 118.07         LABS:  BMP  Recent Labs   Lab 10/31/24  0746 10/31/24  0530 10/30/24  2239 10/30/24  1645 10/30/24  1629 10/30/24  1036 10/30/24  0533 10/29/24  1558   NA  --  143  --   --  142  --  142 141   POTASSIUM  --  3.6  --   --  3.8  --  3.9 4.4   CHLORIDE  --  107  --   --  110*  --  111* 110*   SOLEDAD  --  8.3*  --   --  7.7*  --  7.8* 8.0*   CO2  --  27  --   --  22  --  22 19*   BUN  --  57.1*  --   --  57.6*  --  60.1* 59.5*   CR  --  1.69*  --   --  1.57*  --  1.74* 1.69*   GLC 95 95 144* 113* 113*   < > 97 109*    < > = values in this interval not displayed.     CBC  Recent Labs   Lab 10/31/24  0530 10/30/24  1629 10/30/24  0833 10/30/24  0533 10/29/24  1558   WBC 2.9*  --  1.8* 2.0* 3.9*   RBC 2.10*  --  1.98* 1.88* 2.12*   HGB 6.9*   < > 6.8* 6.3* 7.3*   HCT 21.2*  --  21.0* 19.9* 22.9*   *  --  106* 106* 108*   MCH 32.9  --  34.3* 33.5* 34.4*   MCHC 32.5  --  32.4 31.7 31.9   RDW 23.4*  --  21.2* 21.4* 21.5*   PLT 36*  --  29* 29* 34*    < > = values in this interval not displayed.     INRNo lab results found in last 7 days.  LFTs  Recent Labs   Lab 10/31/24  0530 10/30/24  0833   ALKPHOS 113 124   AST 25 26   ALT 12 13   BILITOTAL 1.1 0.9   PROTTOTAL 4.5* 4.5*   ALBUMIN 2.7* 2.5*      PANCNo lab results found in last 7 days.

## 2024-10-31 NOTE — PROGRESS NOTES
[facility]    Medicine Progress Note - Hospitalist Service, GOLD TEAM     Date of Admission:  10/29/2024    Assessment & Plan   Gucci Logan is a 74 year old male with a past medical history of HCM w/ EDGAR, w/ valsalva LVOTO gradient, HFpEF, HTN, Metastatic Prostate Cancer, Decompensated Cirrhosis 2/2 HCV c/b ascites, SBP, CKD Stage III, T2DM, class II obesity. He was a direct admit and was admitted on 10/29/24 for escalation of his outpatient diuretic regimen ISO HCM, unable to make frequent outpatient labs and appts d/t mobility issues, hence the admit for safety. Initially admitted to the Cardiology service, but hospital course c/b SBP after which he was transferred to the Medicine service on 10/31/24.     Spontaneous Bacterial Peritonitis  Cirrhosis 2/2 HCV, Newly Decompensated, c/b ascites, SBP  Hx of cirrhosis 2/2 HCV (tx as below). PTA pt's cirrhosis had largely been compensated, but since beginning chemotherapy for metastatic prostate cancer (see below), he has had new onset ascites this hospitalization. Paracentesis performed 10/30 showed ascitic fluid was c/w SBP, and he was started on Ceftriaxone at that time, and given 50g 25% albumin, too. GI following this hospitalization and they recommend holding off on diuresis as they feel he is intravascularly depleted. Of note, primary hepatologist is Dr. Renteria. Last EGD 05/2023 w/o e/o varices. On transfer to Medicine service, reporting minimal abdominal pain, otherwise feeling stable. VSS, afebrile.  - GI consulted, appreciate assistance    - Per GI recommendations, diuresis currently being held (Spironolactone, Bumex drip)   - Goal I/O to be as close to net as possible in d/w GI today (currently -3L since admission)   - Hold off on paracentesis for today given rise in Cr, at next one order gram stain, cultures, & cytology   - Continue CTX 2g daily for now   - As transfusing w/ PRBCs today (see below), will  hold off on colloid products  - Consider plt transfusion if plts <20k    RENNY, suspect prerenal  CKD Stage III  Baseline Cr ~0.8-1 recently. On review of prior BMPs, appears that his Cr periodically rises during periods c/w intravascular depletion ISO. The past week, Cr has been ~1.2-1.7, and today is 1.69. This correlates w/ more aggressive diuresis for HCM (see below), and paracentesis, likely resulting in decreased intravascular volume and worsening renal function/prerenal RENNY. Possible Cardiorenal picture, too. K and Na WNL. Mg low as below.  - Holding off on further diuresis for now as above  - Avoid nephrotoxic agents as best as possible  - Trend renal function daily for now  - Holding off on further IVF, will see how his Cr responds to conservative mgmt of volume     Hypomagnesemia   Mag 1.4 today. ISO RENNY as above. Got 4g Mag Sulfate per RN-drive protocol today.  - Continue RN-driven Mag replacement protocol, and continue potassium protocol, too    HCM with EDGAR with Valsalva LVOTO Gradient  Acute-on-Chronic HFpEF  HTN  Longstanding hx of HCM, recently [facility] care w/ [facility] for HF. Has been struggling w/ worsening BLE edema, increased abdominal distension (weeping ascites), from poorly controlled hypervolemia. PTA diuretic regimen w/ Spironolactone was deemed inadequate, and given his difficulties w/ mobility at home and need for frequent appts/labs w/ titration of OP diuresis, decision was made to directly admit him instead for more aggressive titration of diuretic regimen w/ close cardiac monitoring. Started on Bumex drip here by Cards. Repeat ECHO 10/30/24 showing hyperkinetic w/ EF 65-70%, dynamic outflow tract obstruction, peak gradient 119mmHg at rest, grade II moderate diastolic dysfunction. Transferred to Medicine service 10/31 given SBP, but Cardiology continues to manage volume status as below.  - Cardiology involved, appreciate assistance   - Hold Spironolactone and Bumex drip for now  given suspected volume depletion as evidenced by rising Cr and net -3L since admission   - Hold PTA Valsartan 160mg daily w/ low BP, worsening LVOT gradient   - Continue PTA Metoprolol succinate 150mg daily   - Avoid vasodilators   - Goal K >4 and Mg >2   - Lymphedema consulted   - BID BMP for now   - Strict I/Os   - Daily standing weights (per pt, a 'good' weight for him is 230lbs, and is 260lbs today)  - Continuous O2 monitoring for now     Metastatic Prostate Cancer  Chronic Pancytopenia   Initially diagnosed earlier this year incidentally following a fall at home, fracturing his L femur fracture. Had ORIF 05/24/24 and curretage samples c/w adenocarcinoma of prostate origin. Has been found to have numerous areas of mets. Has since followed w/ Oncology as an OP, last saw 10/24/24. PTA on Daralutamide BID, Docetaxel (C4D1 was on 10/24/24) and Leuprolide [id number] (last 9/5/24). Also gets Neulasta, last 10/25. Has chronic pancytopenia, likely r/t multiple comorbidities (cirrhosis, CKD) and iatrogenic (chemotherapy). Baseline hgb ~8-9 recently, and had drift today down to 6.9, but e/o active bleeding. Remains HDS.   - Oncology consulted here 10/31 and recommended to continue Daralutamide, they signed off  - Continue PTA Prednisone   - Transfuse for Hgb <7   - Getting 1 unit of PRBCs today for Hgb 6.9 (10/31)   - Recheck Hgb now     Type 2 Diabetes Mellitus, non-insulin-dependent, well-controlled  Last A1c 5.8% on 10/29/24. PTA Metformin. BG checks past 24 hours are all within goal (<180).   - Hold PTA Metformin given RENNY as above  - Continue medium sliding scale as started on admission  - BG checks TID AC + at bedtime + 0200  - Hypoglycemia protocol          Diet: 2 Gram Sodium Diet  Snacks/Supplements Adult: Ensure Max Protein (bariatric); Between Meals    DVT Prophylaxis: PCDs for now  Rankin Catheter: Not present  Lines: PRESENT      Port a Cath 08/12/24 Single Lumen Right-Site Assessment: WDL      Cardiac  "Monitoring: ACTIVE order. Indication: Acute decompensated heart failure (48 hours)  Code Status: Full Code      Clinically Significant Risk Factors          # Hyperchloremia: Highest Cl = 111 mmol/L in last 2 days, will monitor as appropriate        # Hypomagnesemia: Lowest Mg = 1.4 mg/dL in last 2 days, will replace as needed   # Hypoalbuminemia: Lowest albumin = 2.5 g/dL at 10/30/2024  8:33 AM, will monitor as appropriate   # Thrombocytopenia: Lowest platelets = 29 in last 2 days, will monitor for bleeding  # Acute Kidney Injury, unspecified: based on a >150% or 0.3 mg/dL increase in last creatinine compared to past 90 day average, will monitor renal function  # Hypertension: Noted on problem list           # Obesity: Estimated body mass index is 37.35 kg/m  as calculated from the following:    Height as of 10/24/24: 1.778 m (5' 10\").    Weight as of this encounter: 118.1 kg (260 lb 4.8 oz)., PRESENT ON ADMISSION     # Financial/Environmental Concerns:           Disposition Plan     Medically Ready for Discharge: Anticipated in 5+ Days           The patient's care was discussed with the Bedside Nurse and Patient.    Christopher Balderas PA-C  Hospitalist Service, [facility]  Securely message with [phone] (more info)  Text page via [employer] Paging/Directory   See signed in provider for up to date coverage information  ______________________________________________________________________    Interval History   Pt seen in his room this afternoon. He is feeling \"ok\" this afternoon, noting that his most bothersome complaint at this time is his chronic L hip pain (following ORIF for femur fx in May). Notes \"some\" abdominal pain, but denies nausea, vomiting, melena, hematochezia or hematemesis. Has mild dyspnea w/ extended periods of conversation, but otherwise fine at rest. No chest pain, headaches, or acute vision changes.     As far as BLE edema goes, he feels it is " slowly getting better since admission.     Physical Exam   Vital Signs: Temp: 98.1  F (36.7  C) Temp src: Oral BP: 105/47 Pulse: 70   Resp: 16 SpO2: 99 % O2 Device: None (Room air)    Weight: 260 lbs 4.8 oz    Constitutional: awake, alert, cooperative, no apparent distress, and appears stated age  Eyes: lids and lashes normal, sclera clear, and conjunctiva normal  ENT: normocephalic, without obvious abnormality  Respiratory: No increased work of breathing, good air exchange, clear to auscultation bilaterally, no crackles or wheezing  Cardiovascular: regular rate and rhythm, normal S1 and S2, no S3, no S4, and no murmur noted  GI: normal bowel sounds, soft, non-distended, and non-tender  Genitounirinary: Urine visualized in bedside urinal is demetrius-colored. No sediment or hematuria.  Skin: no bruising or bleeding, no redness, warmth, or swelling, no rashes, and no lesions on visualized skin. BLEs in lymphedema wraps, not removed for exam.  Musculoskeletal: 1+ pitting edema to BLEs, no other areas of edema. No deformities.  Neurologic: Moving all extremities equally and spontaneously. No obvious focal neuro deficits.  Neuropsychiatric: General: normal, calm, and normal eye contact  Level of consciousness: alert / normal  Affect: normal and pleasant  Memory and insight: normal, memory for past and recent events intact, and thought process normal    Medical Decision Making       100 MINUTES SPENT BY ME on the date of service doing chart review, history, exam, documentation & further activities per the note.      Data     I have personally reviewed the following data over the past 24 hrs:    2.9 (L)  \   6.9 (LL)   / 36 (LL)     140 104 54.2 (H) /  164 (H)   4.2 27 1.63 (H) \     ALT: 12 AST: 25 AP: 113 TBILI: 1.1   ALB: 2.7 (L) TOT PROTEIN: 4.5 (L) LIPASE: N/A       Imaging results reviewed over the past 24 hrs:   No results found for this or any previous visit (from the past 24 hours).  Recent Labs   Lab 10/31/24  3837  10/31/24  1309 10/31/24  0746 10/31/24  0530 10/30/24  1645 10/30/24  1629 10/30/24  1036 10/30/24  0833 10/30/24  0533   WBC  --   --   --  2.9*  --   --   --  1.8* 2.0*   HGB  --   --   --  6.9*  --  7.7*  --  6.8* 6.3*   MCV  --   --   --  101*  --   --   --  106* 106*   PLT  --   --   --  36*  --   --   --  29* 29*     --   --  143  --  142  --   --  142   POTASSIUM 4.2  --   --  3.6  --  3.8  --   --  3.9   CHLORIDE 104  --   --  107  --  110*  --   --  111*   CO2 27  --   --  27  --  22  --   --  22   BUN 54.2*  --   --  57.1*  --  57.6*  --   --  60.1*   CR 1.63*  --   --  1.69*  --  1.57*  --   --  1.74*   ANIONGAP 9  --   --  9  --  10  --   --  9   SOLEDAD 8.3*  --   --  8.3*  --  7.7*  --   --  7.8*   * 163* 95 95   < > 113*   < >  --  97   ALBUMIN  --   --   --  2.7*  --   --   --  2.5*  --    PROTTOTAL  --   --   --  4.5*  --   --   --  4.5*  --    BILITOTAL  --   --   --  1.1  --   --   --  0.9  --    ALKPHOS  --   --   --  113  --   --   --  124  --    ALT  --   --   --  12  --   --   --  13  --    AST  --   --   --  25  --   --   --  26  --     < > = values in this interval not displayed.

## 2024-10-31 NOTE — PROGRESS NOTES
Cardiology Progress Note      Assessment & Plan:  Gucci Logan is a 74 year old male with a history of HCM, metastatic prostate cancer, cirrhosis d/t chronic HCV infection who presents for heart failure exacerbation.    Today's Updates:  - 1uPRBC for Hgb 6.9  - stop spironolactone & Bumex gtt d/t hypotension   - touch base with oncology for formal consult    # HCM with EDGAR with valsalve LVOTO gradient   # Acute on chronic heart failure with preserved ejection fraction  # HTN  Patient recently seen in [facility] for enrollment where he was found to be grossly hypervolemic. Patient was reluctant to start loop diuretic but agreeable to spironolactone. Trialed PO Bumex starting 10/28 with minimal improvement, admitted for IV diuresis.   - Repeat TTE with worsening LVOT gradient  - Volume status: grossly hypervolemic 2/2 cirrhosis, not decompensated heart failure. Patient states a 'good' weight for him is 230 lbs, though does not have scale at home. Weight on admission 272 lbs.  Goal NN 2 L.  - Bumex gtt held overnight for sleep, restart this AM with subsequent hypotension, drip stopped. Do not want to diurese too quickly with HCM and LVOT  - hold spironolactone   - Continue metoprolol succinate 150 mg daily  - On Valsartan 160 mg daily- holding with low BP, worsening LVOT gradient  - Avoid vasodilators  - Ranolazine was stopped d/t no improvement of his symptoms  - Dr. Gonsales office working on getting a myosin inhibitor for him as his symptoms   - Has been recommended to do genetic counseling previously  - K > 4, Mg > 2, RN protocols ordered  - Lymphedema consult  - BID BMP with aggressive diuresis  - Strict I&O's  - Daily Standing weights    # Metastatic prostate cancer   # Pancytopenia  # Anemia  - Follows with oncology OP, consulted here for guidance on continuing chemotherapy while admitted and coagulopathy, formal consult pending  - continue PTA oral chemotherapy darolutamide 600mg BID  - WBC  Differential   - Hgb 6.9 -> 1uPRBC  - CBC daily     # Cirrhosis d/t chronic HCV infection  # Ascites  # Thrombocytopenia  # Hypoalbuminemia   # Spontaneous bacterial pertonitis  - Management per liver team   - CAPS team for paracentesis: 3.5 L removed 10/30   Ascites fluid hazy with + ANC sent for cytology, Blood cultures drawn 10/30  - Hepatology consult 10/30/24:    - Rocephin 2 g daily x5 days (thru 11/4), before transitioning to lifelong ppx   - 50g 25% Albumin 10/30/24 and 11/1/2024   - consider platelet transfuse if lower then 20,000   - US RUQ with Doppler - results pending      # CKD stage 3  - Cr up to 1.69 (1.52) today, likely cardiorenal  - Holding diuresis as above  - BMP daily     # Type II Diabetes Mellitus  # Class II obesity  - HgbA1c 5.8%  (10/29/24)  - Hold PTA Metformin  - medium intensity sliding scale insulin with hypoglycemia protocol    FEN: Regular  Code status: FULL   Prophylaxis:  ambulation  Isolation: Contact  Disposition: 5+ days     Patient discussed w/ Dr. Yepez, who agrees with above plan.    Medically Ready for Discharge: Anticipated in 5+ Days    DIANE CHAVEZ [facility]  General Cardiology  Vocera     Medical Decision Making       45 MINUTES SPENT BY ME on the date of service doing chart review, history, exam, documentation & further activities per the note.      I have seen, interviewed, and examined patient. I have reviewed the laboratory tests, imaging, and other investigations. I have reviewed the management plan with the patient. I discussed with the team and agree with the findings and plan in this nurse practitioner's note. In addition, changes in the physical examination, assessment and plan have been incorporated into the note by myself, as to make it a single cohesive document.     My decision today: Transfer to medicine service      Tigist Yepez MD, MS  Cardiology/Cardiac EP Attending Staff       Interval History:  Patient seen  in bed. Patient reports that his abdomen and legs still feel bloated, with mild shortness of breath and chronic fatigue.    Most recent vital signs:  BP 99/48 (BP Location: Right arm)   Pulse 82   Temp 98.4  F (36.9  C) (Oral)   Resp 16   Wt 118.1 kg (260 lb 4.8 oz)   SpO2 96%   BMI 37.35 kg/m    Temp:  [98  F (36.7  C)-98.7  F (37.1  C)] 98.4  F (36.9  C)  Pulse:  [74-82] 82  Resp:  [13-23] 16  BP: ()/(40-54) 99/48  SpO2:  [90 %-97 %] 96 %  Wt Readings from Last 2 Encounters:   10/31/24 118.1 kg (260 lb 4.8 oz)   10/24/24 123.4 kg (272 lb 1.6 oz)       Intake/Output Summary (Last 24 hours) at 10/30/2024 0739  Last data filed at 10/30/2024 0712  Gross per 24 hour   Intake 760 ml   Output 2500 ml   Net -1740 ml       Physical exam:  General: Pleasant elderly male. Appears comfortable and in no acute distress. Alert and interactive  HEENT: Normocephalic, atraumatic. No scleral icterus or injection  Neck: JVP elevated to mid-neck.  CARDIAC: Regular rate and rhythm, with +3 holosystolic murmur best appreciated over the apex. . Peripheral pulses 2+  RESP: Normal work of breathing on room air without use of accessory breathing muscles. Clear to auscultation in all fields. No wheezes, rhonchi or crackles appreciated.  GI: Abdominal distention present. Soft and nontender. Tympanic to percussion.  EXTREMITIES: +3 lower extremity edema extending to mid abdomen which is +2. No cyanosis or clubbing. Warm and well perfused. No venous stasis changes.   SKIN: No acute lesions appreciated. Warm and dry to touch  NEURO: Alert and oriented X3, CN II-XII grossly intact, no focal neurological deficits noted, normal speech  PSYCH: Mood and affect are appropriate    Labs (Past three days):  CBC  Recent Labs   Lab 10/31/24  0530 10/30/24  1629 10/30/24  0833 10/30/24  0533 10/29/24  1558   WBC 2.9*  --  1.8* 2.0* 3.9*   RBC 2.10*  --  1.98* 1.88* 2.12*   HGB 6.9* 7.7* 6.8* 6.3* 7.3*   HCT 21.2*  --  21.0* 19.9* 22.9*   MCV  101*  --  106* 106* 108*   MCH 32.9  --  34.3* 33.5* 34.4*   MCHC 32.5  --  32.4 31.7 31.9   RDW 23.4*  --  21.2* 21.4* 21.5*   PLT 36*  --  29* 29* 34*     BMP  Recent Labs   Lab 10/31/24  0530 10/30/24  2239 10/30/24  1645 10/30/24  1629 10/30/24  1036 10/30/24  0833 10/30/24  0533 10/29/24  1558     --   --  142  --   --  142 141   POTASSIUM 3.6  --   --  3.8  --   --  3.9 4.4   CHLORIDE 107  --   --  110*  --   --  111* 110*   CO2 27  --   --  22  --   --  22 19*   ANIONGAP 9  --   --  10  --   --  9 12   GLC 95 144* 113* 113*   < >  --  97 109*   BUN 57.1*  --   --  57.6*  --   --  60.1* 59.5*   CR 1.69*  --   --  1.57*  --   --  1.74* 1.69*   GFRESTIMATED 42*  --   --  46*  --   --  41* 42*   SOLEDAD 8.3*  --   --  7.7*  --   --  7.8* 8.0*   MAG 1.4*  --   --   --   --  1.6*  --   --     < > = values in this interval not displayed.     Troponins:   Lab Results   Component Value Date    TROPI <0.015 07/08/2020        INRNo lab results found in last 7 days.  Liver panel  Recent Labs   Lab 10/31/24  0530 10/30/24  0833   PROTTOTAL 4.5* 4.5*   ALBUMIN 2.7* 2.5*   BILITOTAL 1.1 0.9   ALKPHOS 113 124   AST 25 26   ALT 12 13       Imaging/procedure results:  EKG 12 Lead 8/19/24:       Echocardiogram 8/19/24:  Interpretation Summary  Known hypertrophic cardiomyopathy with asymmetric septal hypertrophy  phenotype.  Global and regional left ventricular function is normal with an EF of 60-65%.  There is asymmetric septal hypertrophy. The maximal wall thickness is 2.6 cm  in the basal anteroseptal segment. There is a resting LVOT gradient of 19 mmHg  that increases to 72 mmHg with Valsalva.  Global right ventricular function is normal. The right ventricle is normal  size.  There is systolic anterior motion of the mitral valve without septal contact.  Mild mitral regurgitation.  IVC diameter <2.1 cm collapsing >50% with sniff suggests a normal RA pressure  of 3 mmHg.  This study was compared with the study from  12/04/2023. No significant changes  noted.     NM Lexiscan stress test 9/12/23:    The nuclear stress test is negative for inducible myocardial ischemia or infarction.    Left ventricular function is normal.    The left ventricular ejection fraction at rest is 75%.  The left ventricular ejection fraction at stress is 69%.    LV cavity size normal.    Stress to rest cavity ratio is 1.05.  TID is absent.    There is no prior study for comparison.

## 2024-10-31 NOTE — PROGRESS NOTES
"   10/31/24 1600   Appointment Info   Signing Clinician's Name / Credentials (OT) Aleta Martínez, OTD, OTR/L   Rehab Comments (OT) edema and OT eval   Quick Adds   Quick Adds Edema   Living Environment   People in Home alone   Current Living Arrangements condominium   Home Accessibility wheelchair accessible   Transportation Anticipated family or friend will provide   Living Environment Comments pt lives in condo with tub shower, shower bench, and w/ch accessibility. endorses some difficulty with width of doors but has narrow w/ch for ease of access.   Self-Care   Usual Activity Tolerance moderate   Current Activity Tolerance poor   Equipment Currently Used at Home wheelchair, manual;commode chair;cane, straight;tub bench;walker, standard   Fall history within last six months yes   Number of times patient has fallen within last six months 1   Activity/Exercise/Self-Care Comment 1x recent fall resulting in hip fx. is mod IND with ADLs at baseline, gets some A from neighbors/friends. has HHOT/PT/RN weekly.   General Information   Onset of Illness/Injury or Date of Surgery 10/29/24   Referring Physician Shira Larkin, DIANE CNP, Lexi Crespo APRN CNP   Additional Occupational Profile Info/Pertinent History of Current Problem \"Gucci Logan is a 74 year old male with a history of HCM, metastatic prostate cancer, cirrhosis d/t chronic HCV infection who presents for heart failure exacerbation.\"   Existing Precautions/Restrictions fall;cardiac;spinal   Left Upper Extremity (Weight-bearing Status) full weight-bearing (FWB)   Right Upper Extremity (Weight-bearing Status) full weight-bearing (FWB)   Left Lower Extremity (Weight-bearing Status) full weight-bearing (FWB)   Right Lower Extremity (Weight-bearing Status) full weight-bearing (FWB)   Cognitive Status Examination   Orientation Status orientation to person, place and time   Edema General Information   Onset of Edema   (endorses since beginning of " infusions, over last few weeks)   Affected Body Part(s) Left LE;Right LE   Edema Etiology Chemo   Etiology Comments dec muscle pump activation, volume overload   Edema Precautions Cardiac Edema/CHF;Active Cancer;Renal Insufficiency   Edema Examination/Assessment   Skin Condition Dryness;Pitting   Skin Condition Comments pt with new onset red, highly sensitive rash across LE's, RN and team aware, per RN ok to wrap   Scar Yes   Ulcerations No   Pitting Assessment firm, 2+ pitting across LE's, difficult to assess due to highly sensitive skin   Range of Motion Comprehensive   General Range of Motion no range of motion deficits identified   Strength Comprehensive (MMT)   Comment, General Manual Muscle Testing (MMT) Assessment generalized weakness   Bed Mobility   Comment (Bed Mobility) not witnessed   Transfers   Transfer Comments STS min A w/ FWW   Activities of Daily Living   BADL Assessment/Intervention upper body dressing;lower body dressing;toileting   Upper Body Dressing Assessment/Training   Comment, (Upper Body Dressing) anticipate setup per clinical judgement   Lower Body Dressing Assessment/Training   Comment, (Lower Body Dressing) anticipate mod A per clinical judgement   Toileting   Comment, (Toileting) anticipate max A per clinical judgement   Clinical Impression   Criteria for Skilled Therapeutic Interventions Met (OT) Yes, treatment indicated   OT Diagnosis dec IND, LE edema impacting IND and functional mobility   Edema: Patient Presentation Edema   OT Problem List-Impairments impacting ADL problems related to;activity tolerance impaired;mobility;strength   Assessment of Occupational Performance 1-3 Performance Deficits   Identified Performance Deficits bathing, dressing, toileting   Planned Therapy Interventions (OT) ADL retraining;IADL retraining;bed mobility training;strengthening;transfer training;home program guidelines;progressive activity/exercise;risk factor education   Edema: Planned Interventions  Gradient compression bandaging;Edema exercises;Precautions to prevent infection/exacerbation;Education;ADL training   Clinical Decision Making Complexity (OT) problem focused assessment/low complexity   Risk & Benefits of therapy have been explained evaluation/treatment results reviewed;care plan/treatment goals reviewed;risks/benefits reviewed;current/potential barriers reviewed;participants voiced agreement with care plan;participants included;patient   OT Total Evaluation Time   OT Eval, Low Complexity Minutes (40594) 7   OT Goals   Therapy Frequency (OT) 5 times/week  (edema 2-3x/week)   OT Predicted Duration/Target Date for Goal Attainment 11/30/24   OT Goals Hygiene/Grooming;Upper Body Dressing;Lower Body Dressing;Toilet Transfer/Toileting;Edema;OT Goal 1   OT: Hygiene/Grooming modified independent   OT: Upper Body Dressing Modified independent   OT: Lower Body Dressing Supervision/stand-by assist   OT: Toilet Transfer/Toileting Supervision/stand-by assist   OT: Edema education to increase ability to manage edema after discharge from the hospital Patient;Verbalize;Demonstrate;[place];Skin care routine;signs/symptoms of intolerance;wear schedule;limb positioning;garrnet/bandage care;discharge recommendations   OT: Management of edema bandages Patient;Demonstrate;Verbalize;[place];quick wrap;garment(s)   OT: Functional edema exercise program to reduce limb volume, increase activity tolerance and improve independence with ADL Patient;Verbalize;Demonstrates;[place];HEP   OT: Goal 1 pt will demo tub transfer with TTB SBA   Interventions   Interventions Quick Adds Therapeutic Activity;Manual Therapy   Manual Therapy   Manual Therapy: Mobilization, MFR, MLD, friction massage minutes (40475) 26   Treatment Detail/Skilled Intervention edema: Pt educated on lymphatic system anatomy/physiology, precautions, and treatment options. See evaluation above for description of BLE edema and skin. Pt is appropriate  "for use of GCBs to promote fluid mobilization and edema management, for improved skin integrity, functional mobility, and ADL independence. Pt's LE's washed and lotioned with Sween 24. Donned Size M TGSoft base layer, followed by 1 x 8cm short stretch bandage from MTP's to distal shin and 1 x 10 cm short stretch bandage from ankle to knee crease using \"quick wrap technique\" with 50% overlap and 50% stretch. Stretch mesh added over tape for increased hold. Pt reporting comfort. Pt educated on wear 24-48 hr wear schedule and indications for removal (pain, numbness, tingling, soiled, or loose/falling off). Pt educated in conservative strategies for managing BLE edema, including elevation and muscle pump activation. Pt verbalized understanding of all education. Patient care order entered.   Therapeutic Activities   Therapeutic Activity Minutes (29295) 9   Treatment Detail/Skilled Intervention pt standing at EOC upon completion of eval, took several steps to EOB with close CGA and use of FWW. req inc time and consistent cues for mobility and direction of turn with lines. AVSS during, sat EOB. RN arriving, discussed LE rash and set plan for wraps. Left with needs met & OT returning in PM for donning wraps.   OT Discharge Planning   OT Plan mobility, FBD, tub transfer, w/ch transfer, PT initiate. Edema: new G2's (1/3)   OT Discharge Recommendation (DC Rec) home with home care occupational therapy   OT Rationale for DC Rec pt needing inc A due to LB edema and heaviness of limbs. anticipate with volume management and IP therapies, pt may be able to d/c home safetly with continued HHOT/PT services. pt may benefit from overall inc level of assist at baseline.   OT Brief overview of current status min A STS, close CGA w/ FWW ambulation short distance   Total Session Time   Timed Code Treatment Minutes 35   Total Session Time (sum of timed and untimed services) 42     "

## 2024-10-31 NOTE — PROVIDER NOTIFICATION
Notified cards 1 provider Dr. Flores via [facility] texting that pt's BP low with MAP ranging from 57-62. Some dizziness and vertigo with movement.     Provider said to hold bumex gtt for a while and have pt increase oral fluids.       0530: liyah paged Dr. Flores that pts hgb 6.9, 1 unit PRBCs released from conditional orders

## 2024-11-01 ENCOUNTER — MEDICAL CORRESPONDENCE (OUTPATIENT)
Dept: HEALTH INFORMATION MANAGEMENT | Facility: CLINIC | Age: 74
End: 2024-11-01

## 2024-11-01 ENCOUNTER — APPOINTMENT (OUTPATIENT)
Dept: OCCUPATIONAL THERAPY | Facility: CLINIC | Age: 74
End: 2024-11-01
Attending: INTERNAL MEDICINE
Payer: COMMERCIAL

## 2024-11-01 ENCOUNTER — APPOINTMENT (OUTPATIENT)
Dept: PHYSICAL THERAPY | Facility: CLINIC | Age: 74
End: 2024-11-01
Attending: CASE MANAGER/CARE COORDINATOR
Payer: COMMERCIAL

## 2024-11-01 LAB
ALBUMIN SERPL BCG-MCNC: 2.6 G/DL (ref 3.5–5.2)
ALP SERPL-CCNC: 120 U/L (ref 40–150)
ALT SERPL W P-5'-P-CCNC: 10 U/L (ref 0–70)
ANION GAP SERPL CALCULATED.3IONS-SCNC: 8 MMOL/L (ref 7–15)
ANION GAP SERPL CALCULATED.3IONS-SCNC: 8 MMOL/L (ref 7–15)
AST SERPL W P-5'-P-CCNC: 25 U/L (ref 0–45)
BILIRUB DIRECT SERPL-MCNC: 0.28 MG/DL (ref 0–0.3)
BILIRUB SERPL-MCNC: 0.8 MG/DL
BUN SERPL-MCNC: 52.5 MG/DL (ref 8–23)
BUN SERPL-MCNC: 52.8 MG/DL (ref 8–23)
CALCIUM SERPL-MCNC: 8.2 MG/DL (ref 8.8–10.4)
CALCIUM SERPL-MCNC: 8.3 MG/DL (ref 8.8–10.4)
CHLORIDE SERPL-SCNC: 104 MMOL/L (ref 98–107)
CHLORIDE SERPL-SCNC: 104 MMOL/L (ref 98–107)
CREAT SERPL-MCNC: 1.67 MG/DL (ref 0.67–1.17)
CREAT SERPL-MCNC: 1.69 MG/DL (ref 0.67–1.17)
EGFRCR SERPLBLD CKD-EPI 2021: 42 ML/MIN/1.73M2
EGFRCR SERPLBLD CKD-EPI 2021: 43 ML/MIN/1.73M2
ERYTHROCYTE [DISTWIDTH] IN BLOOD BY AUTOMATED COUNT: 23.2 % (ref 10–15)
GLUCOSE BLDC GLUCOMTR-MCNC: 109 MG/DL (ref 70–99)
GLUCOSE BLDC GLUCOMTR-MCNC: 163 MG/DL (ref 70–99)
GLUCOSE BLDC GLUCOMTR-MCNC: 166 MG/DL (ref 70–99)
GLUCOSE SERPL-MCNC: 111 MG/DL (ref 70–99)
GLUCOSE SERPL-MCNC: 151 MG/DL (ref 70–99)
HCO3 SERPL-SCNC: 28 MMOL/L (ref 22–29)
HCO3 SERPL-SCNC: 29 MMOL/L (ref 22–29)
HCT VFR BLD AUTO: 26.1 % (ref 40–53)
HGB BLD-MCNC: 8.5 G/DL (ref 13.3–17.7)
MAGNESIUM SERPL-MCNC: 1.9 MG/DL (ref 1.7–2.3)
MCH RBC QN AUTO: 32.9 PG (ref 26.5–33)
MCHC RBC AUTO-ENTMCNC: 32.6 G/DL (ref 31.5–36.5)
MCV RBC AUTO: 101 FL (ref 78–100)
PLATELET # BLD AUTO: 42 10E3/UL (ref 150–450)
POTASSIUM SERPL-SCNC: 3.7 MMOL/L (ref 3.4–5.3)
POTASSIUM SERPL-SCNC: 4.2 MMOL/L (ref 3.4–5.3)
PROT SERPL-MCNC: 4.5 G/DL (ref 6.4–8.3)
RBC # BLD AUTO: 2.58 10E6/UL (ref 4.4–5.9)
SODIUM SERPL-SCNC: 140 MMOL/L (ref 135–145)
SODIUM SERPL-SCNC: 141 MMOL/L (ref 135–145)
WBC # BLD AUTO: 6.7 10E3/UL (ref 4–11)

## 2024-11-01 PROCEDURE — 250N000013 HC RX MED GY IP 250 OP 250 PS 637: Performed by: NURSE PRACTITIONER

## 2024-11-01 PROCEDURE — 250N000011 HC RX IP 250 OP 636: Performed by: STUDENT IN AN ORGANIZED HEALTH CARE EDUCATION/TRAINING PROGRAM

## 2024-11-01 PROCEDURE — 250N000011 HC RX IP 250 OP 636: Performed by: NURSE PRACTITIONER

## 2024-11-01 PROCEDURE — 120N000005 HC R&B MS OVERFLOW UMMC

## 2024-11-01 PROCEDURE — 80053 COMPREHEN METABOLIC PANEL: CPT | Performed by: NURSE PRACTITIONER

## 2024-11-01 PROCEDURE — 99233 SBSQ HOSP IP/OBS HIGH 50: CPT | Performed by: STUDENT IN AN ORGANIZED HEALTH CARE EDUCATION/TRAINING PROGRAM

## 2024-11-01 PROCEDURE — 83735 ASSAY OF MAGNESIUM: CPT | Performed by: INTERNAL MEDICINE

## 2024-11-01 PROCEDURE — 85014 HEMATOCRIT: CPT | Performed by: NURSE PRACTITIONER

## 2024-11-01 PROCEDURE — 85041 AUTOMATED RBC COUNT: CPT | Performed by: NURSE PRACTITIONER

## 2024-11-01 PROCEDURE — 82248 BILIRUBIN DIRECT: CPT | Performed by: NURSE PRACTITIONER

## 2024-11-01 PROCEDURE — 250N000011 HC RX IP 250 OP 636: Mod: JZ | Performed by: STUDENT IN AN ORGANIZED HEALTH CARE EDUCATION/TRAINING PROGRAM

## 2024-11-01 PROCEDURE — 250N000013 HC RX MED GY IP 250 OP 250 PS 637: Performed by: UROLOGY

## 2024-11-01 PROCEDURE — 250N000013 HC RX MED GY IP 250 OP 250 PS 637: Performed by: INTERNAL MEDICINE

## 2024-11-01 PROCEDURE — 250N000013 HC RX MED GY IP 250 OP 250 PS 637: Performed by: CASE MANAGER/CARE COORDINATOR

## 2024-11-01 PROCEDURE — 250N000012 HC RX MED GY IP 250 OP 636 PS 637: Performed by: NURSE PRACTITIONER

## 2024-11-01 PROCEDURE — 97162 PT EVAL MOD COMPLEX 30 MIN: CPT | Mod: GP

## 2024-11-01 PROCEDURE — 99232 SBSQ HOSP IP/OBS MODERATE 35: CPT | Mod: GC | Performed by: STUDENT IN AN ORGANIZED HEALTH CARE EDUCATION/TRAINING PROGRAM

## 2024-11-01 PROCEDURE — 97140 MANUAL THERAPY 1/> REGIONS: CPT | Mod: GO | Performed by: OCCUPATIONAL THERAPIST

## 2024-11-01 PROCEDURE — 97530 THERAPEUTIC ACTIVITIES: CPT | Mod: GP

## 2024-11-01 PROCEDURE — P9047 ALBUMIN (HUMAN), 25%, 50ML: HCPCS | Mod: JZ | Performed by: STUDENT IN AN ORGANIZED HEALTH CARE EDUCATION/TRAINING PROGRAM

## 2024-11-01 RX ORDER — POTASSIUM CHLORIDE 750 MG/1
20 TABLET, EXTENDED RELEASE ORAL ONCE
Status: COMPLETED | OUTPATIENT
Start: 2024-11-01 | End: 2024-11-01

## 2024-11-01 RX ORDER — MAGNESIUM OXIDE 400 MG/1
400 TABLET ORAL EVERY 4 HOURS
Status: COMPLETED | OUTPATIENT
Start: 2024-11-01 | End: 2024-11-01

## 2024-11-01 RX ORDER — ALBUMIN (HUMAN) 12.5 G/50ML
50 SOLUTION INTRAVENOUS ONCE
Status: COMPLETED | OUTPATIENT
Start: 2024-11-01 | End: 2024-11-01

## 2024-11-01 RX ADMIN — OXYCODONE HYDROCHLORIDE 10 MG: 5 TABLET ORAL at 20:53

## 2024-11-01 RX ADMIN — POTASSIUM CHLORIDE 20 MEQ: 750 TABLET, EXTENDED RELEASE ORAL at 05:31

## 2024-11-01 RX ADMIN — CEFTRIAXONE SODIUM 2 G: 2 INJECTION, POWDER, FOR SOLUTION INTRAMUSCULAR; INTRAVENOUS at 14:59

## 2024-11-01 RX ADMIN — ALBUMIN HUMAN 50 G: 0.25 SOLUTION INTRAVENOUS at 12:30

## 2024-11-01 RX ADMIN — HEPARIN, PORCINE (PF) 10 UNIT/ML INTRAVENOUS SYRINGE 5 ML: at 17:02

## 2024-11-01 RX ADMIN — MAGNESIUM OXIDE TAB 400 MG (241.3 MG ELEMENTAL MG) 400 MG: 400 (241.3 MG) TAB at 05:31

## 2024-11-01 RX ADMIN — PREDNISONE 5 MG: 5 TABLET ORAL at 08:13

## 2024-11-01 RX ADMIN — MAGNESIUM OXIDE TAB 400 MG (241.3 MG ELEMENTAL MG) 400 MG: 400 (241.3 MG) TAB at 09:34

## 2024-11-01 RX ADMIN — ASPIRIN 81 MG CHEWABLE TABLET 81 MG: 81 TABLET CHEWABLE at 08:14

## 2024-11-01 RX ADMIN — Medication 950 MG: at 08:14

## 2024-11-01 RX ADMIN — METOPROLOL SUCCINATE 150 MG: 50 TABLET, EXTENDED RELEASE ORAL at 08:13

## 2024-11-01 RX ADMIN — PANTOPRAZOLE SODIUM 40 MG: 40 TABLET, DELAYED RELEASE ORAL at 08:13

## 2024-11-01 RX ADMIN — INSULIN ASPART 1 UNITS: 100 INJECTION, SOLUTION INTRAVENOUS; SUBCUTANEOUS at 13:37

## 2024-11-01 NOTE — PROGRESS NOTES
[facility]    Medicine Progress Note - Hospitalist Service, GOLD TEAM 7    Date of Admission:  10/29/2024    Assessment & Plan   Gucci Logan is a 74 year old male with a past medical history of HCM w/ EDGAR, w/ valsalva LVOTO gradient, HFpEF, HTN, Metastatic Prostate Cancer, Decompensated Cirrhosis 2/2 HCV c/b ascites, SBP, CKD Stage III, T2DM, class II obesity. He was a direct admit and was admitted on 10/29/24 for escalation of his outpatient diuretic regimen ISO HCM, unable to make frequent outpatient labs and appts d/t mobility issues, hence the admit for safety. Initially admitted to the Cardiology service, but hospital course c/b SBP after which he was transferred to the Medicine service on 10/31/24.     Spontaneous Bacterial Peritonitis  Cirrhosis 2/2 HCV, Newly Decompensated, c/b ascites, SBP  Hx of cirrhosis 2/2 HCV (tx as below). PTA pt's cirrhosis had largely been compensated, but since beginning chemotherapy for metastatic prostate cancer (see below), he has had new onset ascites this hospitalization. Paracentesis performed 10/30 showed ascitic fluid was c/w SBP, and he was started on Ceftriaxone at that time, and given 50g 25% albumin, too. GI following this hospitalization and they recommend holding off on diuresis as they feel he is intravascularly depleted. Of note, primary hepatologist is Dr. Renteria. Last EGD 05/2023 w/o e/o varices. On transfer to Medicine service, reporting minimal abdominal pain, otherwise feeling stable. VSS, afebrile.  - GI consulted, appreciate assistance    - Per GI recommendations, diuresis currently being held (Spironolactone, Bumex drip)   - Goal I/O to be as close to net as possible in d/w GI today (currently -3L since admission)   - Hold off on paracentesis for today given rise in Cr, at next one order gram stain, cultures, & cytology   - Continue CTX 2g daily for now, started 10/30, to receive albumin today on 11/1, will  get more if he gets a para today as well   - Consider plt transfusion if plts <20k    RENNY, suspect prerenal  CKD Stage III  Baseline Cr ~0.8-1 recently. On review of prior BMPs, appears that his Cr periodically rises during periods c/w intravascular depletion ISO. The past week, Cr has been ~1.2-1.7, and today is 1.69. This correlates w/ more aggressive diuresis for HCM (see below), and paracentesis, likely resulting in decreased intravascular volume and worsening renal function/prerenal RENNY. Possible Cardiorenal picture, too. K and Na WNL. Mg low as below.  - Holding off on further diuresis for now as above  - Avoid nephrotoxic agents as best as possible  - Trend renal function daily for now  -  to receive albumin today on 11/1, will get more if he gets a para today as well     Hypomagnesemia   Mag 1.4 today. ISO RENNY as above. Got 4g Mag Sulfate per RN-drive protocol today.  - Continue RN-driven Mag replacement protocol, and continue potassium protocol, too    HCM with EDGAR with Valsalva LVOTO Gradient  Acute-on-Chronic HFpEF  HTN  Longstanding hx of HCM, recently established care w/ [facility] for HF. Has been struggling w/ worsening BLE edema, increased abdominal distension (weeping ascites), from poorly controlled hypervolemia. PTA diuretic regimen w/ Spironolactone was deemed inadequate, and given his difficulties w/ mobility at home and need for frequent appts/labs w/ titration of OP diuresis, decision was made to directly admit him instead for more aggressive titration of diuretic regimen w/ close cardiac monitoring. Started on Bumex drip here by Cards. Repeat ECHO 10/30/24 showing hyperkinetic w/ EF 65-70%, dynamic outflow tract obstruction, peak gradient 119mmHg at rest, grade II moderate diastolic dysfunction. Transferred to Medicine service 10/31 given SBP, but Cardiology continues to manage volume status as below.  - Cardiology involved, appreciate assistance   - Hold Spironolactone and Bumex drip for  now given suspected volume depletion as evidenced by rising Cr and net -3L since admission   - Hold PTA Valsartan 160mg daily w/ low BP, worsening LVOT gradient   - Continue PTA Metoprolol succinate 150mg daily   - Avoid vasodilators   - Goal K >4 and Mg >2   - Lymphedema consulted   - BID BMP for now   - Strict I/Os   - Daily standing weights (per pt, a 'good' weight for him is 230lbs, and is 260lbs today)  - Continuous O2 monitoring for now     Metastatic Prostate Cancer  Chronic Pancytopenia   Initially diagnosed earlier this year incidentally following a fall at home, fracturing his L femur fracture. Had ORIF 05/24/24 and curretage samples c/w adenocarcinoma of prostate origin. Has been found to have numerous areas of mets. Has since followed w/ Oncology as an OP, last saw 10/24/24. PTA on Daralutamide BID, Docetaxel (C4D1 was on 10/24/24) and Leuprolide T4xmwsmd (last 9/5/24). Also gets Neulasta, last 10/25. Has chronic pancytopenia, likely r/t multiple comorbidities (cirrhosis, CKD) and iatrogenic (chemotherapy). Baseline hgb ~8-9 recently, and had drift today down to 6.9, but e/o active bleeding. Remains HDS.   - Oncology consulted here 10/31 and recommended to continue Daralutamide, they signed off  - Continue PTA Prednisone   - Transfuse for Hgb <7     Type 2 Diabetes Mellitus, non-insulin-dependent, well-controlled  Last A1c 5.8% on 10/29/24. PTA Metformin. BG checks past 24 hours are all within goal (<180).   - Hold PTA Metformin given RENNY as above  - Continue medium sliding scale as started on admission  - BG checks TID AC + at bedtime + 0200  - Hypoglycemia protocol          Diet: 2 Gram Sodium Diet  Snacks/Supplements Adult: Ensure Enlive; Between Meals    DVT Prophylaxis: PCDs for now  Rankin Catheter: Not present  Lines: PRESENT      Port a Cath 08/12/24 Single Lumen Right-Site Assessment: WDL      Cardiac Monitoring: ACTIVE order. Indication: Acute decompensated heart failure (48 hours)  Code Status:  "Full Code      Clinically Significant Risk Factors          # Hyperchloremia: Highest Cl = 110 mmol/L in last 2 days, will monitor as appropriate        # Hypomagnesemia: Lowest Mg = 1.4 mg/dL in last 2 days, will replace as needed   # Hypoalbuminemia: Lowest albumin = 2.5 g/dL at 10/30/2024  8:33 AM, will monitor as appropriate   # Thrombocytopenia: Lowest platelets = 36 in last 2 days, will monitor for bleeding    # Acute Kidney Injury, unspecified: based on a >150% or 0.3 mg/dL increase in last creatinine compared to past 90 day average, will monitor renal function  # Hypertension: Noted on problem list           # Obesity: Estimated body mass index is 34.64 kg/m  as calculated from the following:    Height as of 10/24/24: 1.778 m (5' 10\").    Weight as of this encounter: 109.5 kg (241 lb 6.4 oz)., PRESENT ON ADMISSION       # Financial/Environmental Concerns:           Disposition Plan     Medically Ready for Discharge: Anticipated in 5+ Days           The patient's care was discussed with the Bedside Nurse and Patient.    Jordan Grimes, DO  Hospitalist Service, GOLD TEAM [address] [place] [address]  Securely message with [employer] (more info)  Text page via [facility] Paging/Directory   See signed in provider for up to date coverage information  ______________________________________________________________________    Interval History     No acute events overnight. Feels like his LE edema is still better than prior. UOP has gone down. Abdominal swelling has returned. Overall though feeling pretty good.     Physical Exam   Vital Signs: Temp: 98.1  F (36.7  C) Temp src: Oral BP: (!) 84/39 Pulse: 70   Resp: 20 SpO2: 91 % O2 Device: None (Room air)    Weight: 241 lbs 6.4 oz    Constitutional: awake, alert, cooperative, no apparent distress, and appears stated age  Eyes: lids and lashes normal, sclera clear, and conjunctiva normal  ENT: normocephalic, without obvious abnormality  Respiratory: " No increased work of breathing, good air exchange, clear to auscultation bilaterally, no crackles or wheezing  Cardiovascular: regular rate and rhythm, normal S1 and S2, no S3, no S4, and no murmur noted  GI: normal bowel sounds, soft, non-distended, and non-tender  Genitounirinary: Urine visualized in bedside urinal is demetrius-colored. No sediment or hematuria.  Skin: no bruising or bleeding, no redness, warmth, or swelling, no rashes, and no lesions on visualized skin. BLEs in lymphedema wraps, not removed for exam.  Musculoskeletal: 1+ pitting edema to BLEs, no other areas of edema. No deformities.  Neurologic: Moving all extremities equally and spontaneously. No obvious focal neuro deficits.  Neuropsychiatric: General: normal, calm, and normal eye contact  Level of consciousness: alert / normal  Affect: normal and pleasant  Memory and insight: normal, memory for past and recent events intact, and thought process normal    Medical Decision Making       55 MINUTES SPENT BY ME on the date of service doing chart review, history, exam, documentation & further activities per the note.      Data     I have personally reviewed the following data over the past 24 hrs:    6.7  \   8.5 (L)   / 42 (LL)     141 104 52.8 (H) /  163 (H)   3.7 29 1.69 (H) \     ALT: 10 AST: 25 AP: 120 TBILI: 0.8   ALB: 2.6 (L) TOT PROTEIN: 4.5 (L) LIPASE: N/A       Imaging results reviewed over the past 24 hrs:   No results found for this or any previous visit (from the past 24 hours).  Recent Labs   Lab 11/01/24  1334 11/01/24  0828 11/01/24  0420 10/31/24  1826 10/31/24  1602 10/31/24  1445 10/31/24  0746 10/31/24  0530 10/30/24  1036 10/30/24  0833   WBC  --   --  6.7  --   --   --   --  2.9*  --  1.8*   HGB  --   --  8.5*  --  8.2*  --   --  6.9*   < > 6.8*   MCV  --   --  101*  --   --   --   --  101*  --  106*   PLT  --   --  42*  --   --   --   --  36*  --  29*   NA  --   --  141  --   --  140  --  143   < >  --    POTASSIUM  --   --  3.7   --   --  4.2  --  3.6   < >  --    CHLORIDE  --   --  104  --   --  104  --  107   < >  --    CO2  --   --  29  --   --  27  --  27   < >  --    BUN  --   --  52.8*  --   --  54.2*  --  57.1*   < >  --    CR  --   --  1.69*  --   --  1.63*  --  1.69*   < >  --    ANIONGAP  --   --  8  --   --  9  --  9   < >  --    SOLEDAD  --   --  8.2*  --   --  8.3*  --  8.3*   < >  --    * 109* 111*   < >  --  164*   < > 95   < >  --    ALBUMIN  --   --  2.6*  --   --   --   --  2.7*  --  2.5*   PROTTOTAL  --   --  4.5*  --   --   --   --  4.5*  --  4.5*   BILITOTAL  --   --  0.8  --   --   --   --  1.1  --  0.9   ALKPHOS  --   --  120  --   --   --   --  113  --  124   ALT  --   --  10  --   --   --   --  12  --  13   AST  --   --  25  --   --   --   --  25  --  26    < > = values in this interval not displayed.

## 2024-11-01 NOTE — PROGRESS NOTES
Major Shift Events:    Alert and oriented, pleasant, vitally stable on room air. Bumex held for low BP's last night. Resolved. Adequate IO. Lymphedema wraps to LE's. HgB stable this AM. Vertigo still intermittent when turning side to side. Sacral wounds healing. Port a cath draws.   Plan: work with therapy, trend labs; continue to diurese  For vital signs and complete assessments, please see documentation flowsheets.

## 2024-11-01 NOTE — PROGRESS NOTES
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      # New Ascites complicated by SBP   # RENNY (baseline Cr 1)  Patient with new onset ascites; diagnostic fluid studies consistent with portal hypertensive ascites 2/2 cirrhosis and cell ct c.w. SBP. Ceftriaxone 2 g daily started 10/30/24, s/p one time albumin 25% 50 g for SBP on 10/30. Cytology not collected with 10/30 paracentesis. Cr still elevated at 1.7, from baseline 1. Could be intravascularly dry with third spacing of fluid since echo showed hyperkinetic. Given creatinine above baseline and SBP, would recommend continuing to hold off on diuresis and keeping goal I/O of balance. Recommend giving 25% albumin 50 g for day 3 of SBP management. If he gets paracentesis today, and recommend cell count/diff and cytology and would give another 25% albumin 50 g. Pt reported re-accumulation of ascites, could do another paracentesis and follow cell count for SBP resolution. Continue ceftriaxone total of 5 days.      #. Decompensated Cirrhosis   Primary Hepatologist: Dr. Renteria   Etiology: HCV/MASLD   Ascites: + clinically.   - hold PTA 2mg Bumex and 50mg spironolactone  - SBP history: + hx (dx 10/30/24)   Hepatic encephalopathy: PTA lactulose and rifaximin  EV: EGD 5/2023 without varices   TIPS: n/a   PV: Patent on US 8/19/24   HCC: CT A/P 10/18/24 without concerning lesions         RECOMMENDATIONS:  - recommend  25% albumin 50 g   - recommend paracentesis, max volume is 3 L. If he gets paracentesis today, and recommend cell count/diff and cytology and would give another 25% albumin 50 g.   - continue ceftriaxone 2 g daily for SBP, total 5 days  - recommend holding off on diuresis  - recommend starting miralax for constipation      Thank you for involving us in this patient's care. Please do not hesitate to contact the GI service with any questions or concerns.     The patient was discussed and plan agreed upon with Dr. Stout, GI Hepatology Service staff physician.    Marija East MD  Inpatient Gastroenterology Service  [facility]    _______________________________________________________________      Subjective: Nursing notes and 24hr events reviewed.     Patient reports feeling like fluid has reaccumulated in his abdomen. Still having tenderness at Rt side of belly, similar to yesterday. Denied fever/chills. Has not had a bowel movement since admission.    ROS:   4 pt ROS negative unless noted in subjective.     Objective:  Blood pressure (!) 84/39, pulse 70, temperature 98.1  F (36.7  C), temperature source Oral, resp. rate 20, weight 109.5 kg (241 lb 6.4 oz), SpO2 91%.    General: male sitting up in chair. Appears comfortable.  Answers appropriately.    Lungs: on RA, comfortable  Abdomen: distended, Soft, reported tenderness on Rt>Lt side, slight erythema at Lt abdomen, no hematoma, BS +. No peritoneal signs.  Extremities: on leg wraps.   Musculoskeletal: No gross deformity.  Skin: No jaundice or rash on exposed skin.  Neurologic: Grossly non-focal.  CN 2-12 grossly intact.   Mental status/Psych: A&O. Asks/answers questions appropriately. Pleasant, interactive.     Date 11/01/24 0700 - 11/02/24 0659   Shift 3003-5147 1710-1782 8414-8180 24 Hour Total   INTAKE   Shift Total(mL/kg)       OUTPUT   Urine 510   510   Shift Total(mL/kg) 510(4.66)   510(4.66)   Weight (kg) 109.5 109.5  109.5 109.5         Recent GI Endoscopic PROCEDURES:  N/A      LABS:  BMP  Recent Labs   Lab 11/01/24  0828 11/01/24  0420 10/31/24  2204 10/31/24  2013 10/31/24  1826 10/31/24  1445 10/31/24  0746 10/31/24  0530 10/30/24  1645 10/30/24  1629   NA  --  141  --   --   --  140  --  143  --  142   POTASSIUM  --  3.7  --   --   --  4.2  --  3.6  --  3.8   CHLORIDE  --  104  --   --   --  104  --  107  --  110*   SOLEDAD  --  8.2*  --   --   --  8.3*  --  8.3*  --  7.7*   CO2  --  29  --   --   --  27  --  27  --  22   BUN  --  52.8*  --   --   --  54.2*  --  57.1*  --  57.6*   CR  --  1.69*  --   --   --  1.63*  --  1.69*  --  1.57*   * 111* 153* 172*   < > 164*   < > 95   < > 113*    < > = values in this interval not displayed.     CBC  Recent Labs   Lab 11/01/24  0420 10/31/24  1602 10/31/24  0530 10/30/24  1629 10/30/24  0833 10/30/24  0533   WBC 6.7  --  2.9*  --  1.8* 2.0*   RBC 2.58*  --  2.10*  --  1.98* 1.88*   HGB 8.5*   < > 6.9*   < > 6.8* 6.3*   HCT 26.1*  --  21.2*  --  21.0* 19.9*   *  --  101*  --  106* 106*   MCH 32.9  --  32.9  --  34.3* 33.5*   MCHC 32.6  --  32.5  --  32.4 31.7   RDW 23.2*  --  23.4*  --  21.2* 21.4*   PLT 42*  --  36*  --  29* 29*    < > = values in this interval not displayed.     INRNo lab results found in last 7 days.  LFTs  Recent Labs   Lab 11/01/24  0420 10/31/24  0530 10/30/24  0833   ALKPHOS 120 113 124   AST 25 25 26   ALT 10 12 13   BILITOTAL 0.8 1.1 0.9   PROTTOTAL 4.5* 4.5* 4.5*   ALBUMIN 2.6* 2.7* 2.5*      PANCNo lab results found in last 7 days.      New IMAGING:  N/A

## 2024-11-01 NOTE — PROGRESS NOTES
11/01/24 1000   Appointment Info   Signing Clinician's Name / Credentials (PT) Ian Ortiz DPT   Rehab Comments (PT) protective spinal prec   Living Environment   People in Home alone   Current Living Arrangements condominium   Home Accessibility wheelchair accessible   Transportation Anticipated family or friend will provide   Living Environment Comments pt lives in condo with tub shower, shower bench, and w/ch accessibility. endorses some difficulty with width of doors but has narrow w/ch for ease of access.   Self-Care   Usual Activity Tolerance moderate   Current Activity Tolerance fair   Equipment Currently Used at Home wheelchair, manual;commode chair;cane, straight;tub bench;walker, standard   Fall history within last six months yes   Number of times patient has fallen within last six months 1   Activity/Exercise/Self-Care Comment Pt reports most recently Gabrielle with stand pivot transfers to manual wheelchair. Utilizes wheelchair mostly to get around home. Has not walked consistently with FWW since before fractures in 5/2024. Has hospital bed and trapeze to assist self with getting OOB, also utilizes leg  when needed. Endorses increased difficulty with mobility when swelling at its greatest at home and asking neighbors and friends to come assist him with mobility. States he is afraid of mobilizing and falling as he doesn't have support. Reports goals for wanting to ambulate again.   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   Existing Precautions/Restrictions fall   Cognition   Affect/Mental Status (Cognition) WFL   Orientation Status (Cognition) oriented x 4   Follows Commands (Cognition) WFL   Pain Assessment   Patient Currently in Pain Yes, see Vital Sign flowsheet  (\"everywhere\")   Integumentary/Edema   Integumentary/Edema other (describe)   Integumentary/Edema Comments BLE edema, with compression wraps donned   Posture    Posture Forward head position;Protracted shoulders   Range of Motion (ROM)   Range of Motion ROM deficits secondary to pain;ROM deficits secondary to swelling;ROM deficits secondary to weakness   ROM Comment Difficulty with B knee flexion 2/2 edema   Strength (Manual Muscle Testing)   Strength (Manual Muscle Testing) Deficits observed during functional mobility   Bed Mobility   Comment, (Bed Mobility) sup>sit with Zhanna and heavy use of bed features   Transfers   Comment, (Transfers) STS with CGA from elevated surface to FWW   Gait/Stairs (Locomotion)   Comment, (Gait/Stairs) Amb x 15' with FWW and CGA   Balance   Balance other (describe)   Balance Quick Add Sitting balance: Static;Sitting balance: dynamic;Standing balance: static;Standing balance: dynamic   Sensory Examination   Sensory Perception patient reports no sensory changes   Coordination   Coordination no deficits were identified   Muscle Tone   Muscle Tone no deficits were identified   Clinical Impression   Criteria for Skilled Therapeutic Intervention Yes, treatment indicated   PT Diagnosis (PT) Impaired functional mobility   Influenced by the following impairments Generalized weakness, pain, edema, reduced activity tolerance   Functional limitations due to impairments Decreased IND with bed mobility, transfers, gait   Clinical Presentation (PT Evaluation Complexity) evolving   Clinical Presentation " Rationale Clinical judgment, [facility]   Clinical Decision Making (Complexity) moderate complexity   Planned Therapy Interventions (PT) bed mobility training;balance training;gait training;home exercise program;patient/family education;postural re-education;stair training;ROM (range of motion);strengthening;transfer training;wheelchair management/propulsion training;progressive activity/exercise;neuromuscular re-education   Risk & Benefits of therapy have been explained evaluation/treatment results reviewed;care plan/treatment goals reviewed;risks/benefits reviewed;patient   PT Total Evaluation Time   PT Eval, Moderate Complexity Minutes (34125) 12   Physical Therapy Goals   PT Frequency 6x/week   PT Predicted Duration/Target Date for Goal Attainment 01/03/25   PT Goals Bed Mobility;Transfers;Gait;Wheelchair Mobility   PT: Bed Mobility Supervision/stand-by assist  (utilizing equipment and bed features per home set up)   PT: Transfers Modified independent;Sit to/from stand;Assistive device;Bed to/from chair   PT: Gait 25 feet;Modified independent;Assistive device   PT: Wheelchair Mobility 50 feet;manual wheelchair  (IND)   PT Discharge Planning   PT Plan Progress gait, assess WC mobility, bed mobility per home set up   PT Discharge Recommendation (DC Rec) Transitional Care Facility;home with assist;home with home care physical therapy   PT Rationale for DC Rec Pt presents below functional baseline with reduced activity tolerance, weakness, and edema impacting independence with functional mobility. Would benefit from TCU at discharge to optimize safety as pt lives alone with taxing effort to perform all mobility in a single day. Pending LOS may progress to home with HHPT.   PT Brief overview of current status A x 1 short distance amb with FWW   Physical Therapy Time and Intention   Timed Code Treatment Minutes 26   Total Session Time (sum of timed and untimed services) 38

## 2024-11-02 ENCOUNTER — APPOINTMENT (OUTPATIENT)
Dept: PHYSICAL THERAPY | Facility: CLINIC | Age: 74
End: 2024-11-02
Attending: INTERNAL MEDICINE
Payer: COMMERCIAL

## 2024-11-02 LAB
ABSOLUTE NEUTROPHILS, BODY FLUID: 558.4 /UL
ALBUMIN BODY FLUID SOURCE: NORMAL
ALBUMIN FLD-MCNC: 1 G/DL
ALBUMIN SERPL BCG-MCNC: 2.9 G/DL (ref 3.5–5.2)
ALP SERPL-CCNC: 142 U/L (ref 40–150)
ALT SERPL W P-5'-P-CCNC: 11 U/L (ref 0–70)
ANION GAP SERPL CALCULATED.3IONS-SCNC: 10 MMOL/L (ref 7–15)
ANION GAP SERPL CALCULATED.3IONS-SCNC: 9 MMOL/L (ref 7–15)
APPEARANCE FLD: CLEAR
AST SERPL W P-5'-P-CCNC: 29 U/L (ref 0–45)
BILIRUB DIRECT SERPL-MCNC: 0.22 MG/DL (ref 0–0.3)
BILIRUB SERPL-MCNC: 0.7 MG/DL
BUN SERPL-MCNC: 45.4 MG/DL (ref 8–23)
BUN SERPL-MCNC: 50 MG/DL (ref 8–23)
CALCIUM SERPL-MCNC: 7.8 MG/DL (ref 8.8–10.4)
CALCIUM SERPL-MCNC: 8.1 MG/DL (ref 8.8–10.4)
CELL COUNT BODY FLUID SOURCE: NORMAL
CHLORIDE SERPL-SCNC: 105 MMOL/L (ref 98–107)
CHLORIDE SERPL-SCNC: 105 MMOL/L (ref 98–107)
COLOR FLD: YELLOW
CREAT SERPL-MCNC: 1.49 MG/DL (ref 0.67–1.17)
CREAT SERPL-MCNC: 1.63 MG/DL (ref 0.67–1.17)
EGFRCR SERPLBLD CKD-EPI 2021: 44 ML/MIN/1.73M2
EGFRCR SERPLBLD CKD-EPI 2021: 49 ML/MIN/1.73M2
ERYTHROCYTE [DISTWIDTH] IN BLOOD BY AUTOMATED COUNT: 22.5 % (ref 10–15)
GLUCOSE BLDC GLUCOMTR-MCNC: 113 MG/DL (ref 70–99)
GLUCOSE BLDC GLUCOMTR-MCNC: 131 MG/DL (ref 70–99)
GLUCOSE BLDC GLUCOMTR-MCNC: 140 MG/DL (ref 70–99)
GLUCOSE BLDC GLUCOMTR-MCNC: 144 MG/DL (ref 70–99)
GLUCOSE SERPL-MCNC: 129 MG/DL (ref 70–99)
GLUCOSE SERPL-MCNC: 144 MG/DL (ref 70–99)
HCO3 SERPL-SCNC: 26 MMOL/L (ref 22–29)
HCO3 SERPL-SCNC: 28 MMOL/L (ref 22–29)
HCT VFR BLD AUTO: 26.4 % (ref 40–53)
HGB BLD-MCNC: 8.3 G/DL (ref 13.3–17.7)
LYMPHOCYTES NFR FLD MANUAL: 1 %
MAGNESIUM SERPL-MCNC: 1.9 MG/DL (ref 1.7–2.3)
MCH RBC QN AUTO: 32.3 PG (ref 26.5–33)
MCHC RBC AUTO-ENTMCNC: 31.4 G/DL (ref 31.5–36.5)
MCV RBC AUTO: 103 FL (ref 78–100)
MONOS+MACROS NFR FLD MANUAL: 19 %
NEUTS BAND NFR FLD MANUAL: 80 %
PLATELET # BLD AUTO: 44 10E3/UL (ref 150–450)
POTASSIUM SERPL-SCNC: 3.8 MMOL/L (ref 3.4–5.3)
POTASSIUM SERPL-SCNC: 4.3 MMOL/L (ref 3.4–5.3)
PROT FLD-MCNC: 1.5 G/DL
PROT SERPL-MCNC: 4.7 G/DL (ref 6.4–8.3)
PROTEIN BODY FLUID SOURCE: NORMAL
RBC # BLD AUTO: 2.57 10E6/UL (ref 4.4–5.9)
SODIUM SERPL-SCNC: 141 MMOL/L (ref 135–145)
SODIUM SERPL-SCNC: 142 MMOL/L (ref 135–145)
WBC # BLD AUTO: 7.6 10E3/UL (ref 4–11)
WBC # FLD AUTO: 698 /UL

## 2024-11-02 PROCEDURE — 99232 SBSQ HOSP IP/OBS MODERATE 35: CPT | Mod: 25 | Performed by: STUDENT IN AN ORGANIZED HEALTH CARE EDUCATION/TRAINING PROGRAM

## 2024-11-02 PROCEDURE — 80053 COMPREHEN METABOLIC PANEL: CPT | Performed by: NURSE PRACTITIONER

## 2024-11-02 PROCEDURE — 250N000012 HC RX MED GY IP 250 OP 636 PS 637: Performed by: NURSE PRACTITIONER

## 2024-11-02 PROCEDURE — 85027 COMPLETE CBC AUTOMATED: CPT | Performed by: NURSE PRACTITIONER

## 2024-11-02 PROCEDURE — 250N000011 HC RX IP 250 OP 636: Performed by: STUDENT IN AN ORGANIZED HEALTH CARE EDUCATION/TRAINING PROGRAM

## 2024-11-02 PROCEDURE — 88112 CYTOPATH CELL ENHANCE TECH: CPT | Mod: TC | Performed by: STUDENT IN AN ORGANIZED HEALTH CARE EDUCATION/TRAINING PROGRAM

## 2024-11-02 PROCEDURE — 97530 THERAPEUTIC ACTIVITIES: CPT | Mod: GP

## 2024-11-02 PROCEDURE — 250N000013 HC RX MED GY IP 250 OP 250 PS 637: Performed by: INTERNAL MEDICINE

## 2024-11-02 PROCEDURE — 82565 ASSAY OF CREATININE: CPT | Performed by: NURSE PRACTITIONER

## 2024-11-02 PROCEDURE — 89051 BODY FLUID CELL COUNT: CPT | Performed by: STUDENT IN AN ORGANIZED HEALTH CARE EDUCATION/TRAINING PROGRAM

## 2024-11-02 PROCEDURE — 120N000005 HC R&B MS OVERFLOW UMMC

## 2024-11-02 PROCEDURE — 99233 SBSQ HOSP IP/OBS HIGH 50: CPT | Performed by: STUDENT IN AN ORGANIZED HEALTH CARE EDUCATION/TRAINING PROGRAM

## 2024-11-02 PROCEDURE — 49083 ABD PARACENTESIS W/IMAGING: CPT | Performed by: STUDENT IN AN ORGANIZED HEALTH CARE EDUCATION/TRAINING PROGRAM

## 2024-11-02 PROCEDURE — 89050 BODY FLUID CELL COUNT: CPT | Performed by: STUDENT IN AN ORGANIZED HEALTH CARE EDUCATION/TRAINING PROGRAM

## 2024-11-02 PROCEDURE — 250N000013 HC RX MED GY IP 250 OP 250 PS 637: Performed by: STUDENT IN AN ORGANIZED HEALTH CARE EDUCATION/TRAINING PROGRAM

## 2024-11-02 PROCEDURE — 82374 ASSAY BLOOD CARBON DIOXIDE: CPT | Performed by: NURSE PRACTITIONER

## 2024-11-02 PROCEDURE — 250N000013 HC RX MED GY IP 250 OP 250 PS 637: Performed by: NURSE PRACTITIONER

## 2024-11-02 PROCEDURE — 84155 ASSAY OF PROTEIN SERUM: CPT | Performed by: NURSE PRACTITIONER

## 2024-11-02 PROCEDURE — 250N000011 HC RX IP 250 OP 636: Mod: JZ | Performed by: STUDENT IN AN ORGANIZED HEALTH CARE EDUCATION/TRAINING PROGRAM

## 2024-11-02 PROCEDURE — 82042 OTHER SOURCE ALBUMIN QUAN EA: CPT | Performed by: STUDENT IN AN ORGANIZED HEALTH CARE EDUCATION/TRAINING PROGRAM

## 2024-11-02 PROCEDURE — 88112 CYTOPATH CELL ENHANCE TECH: CPT | Mod: 26 | Performed by: PATHOLOGY

## 2024-11-02 PROCEDURE — 97110 THERAPEUTIC EXERCISES: CPT | Mod: GP

## 2024-11-02 PROCEDURE — 82247 BILIRUBIN TOTAL: CPT | Performed by: NURSE PRACTITIONER

## 2024-11-02 PROCEDURE — 83735 ASSAY OF MAGNESIUM: CPT | Performed by: UROLOGY

## 2024-11-02 PROCEDURE — 250N000011 HC RX IP 250 OP 636: Performed by: NURSE PRACTITIONER

## 2024-11-02 PROCEDURE — 97116 GAIT TRAINING THERAPY: CPT | Mod: GP

## 2024-11-02 PROCEDURE — 0W9G3ZZ DRAINAGE OF PERITONEAL CAVITY, PERCUTANEOUS APPROACH: ICD-10-PCS | Performed by: STUDENT IN AN ORGANIZED HEALTH CARE EDUCATION/TRAINING PROGRAM

## 2024-11-02 PROCEDURE — P9047 ALBUMIN (HUMAN), 25%, 50ML: HCPCS | Mod: JZ | Performed by: STUDENT IN AN ORGANIZED HEALTH CARE EDUCATION/TRAINING PROGRAM

## 2024-11-02 PROCEDURE — 88305 TISSUE EXAM BY PATHOLOGIST: CPT | Mod: TC | Performed by: STUDENT IN AN ORGANIZED HEALTH CARE EDUCATION/TRAINING PROGRAM

## 2024-11-02 PROCEDURE — 87070 CULTURE OTHR SPECIMN AEROBIC: CPT | Performed by: STUDENT IN AN ORGANIZED HEALTH CARE EDUCATION/TRAINING PROGRAM

## 2024-11-02 PROCEDURE — 88305 TISSUE EXAM BY PATHOLOGIST: CPT | Mod: 26 | Performed by: PATHOLOGY

## 2024-11-02 PROCEDURE — 84132 ASSAY OF SERUM POTASSIUM: CPT | Performed by: NURSE PRACTITIONER

## 2024-11-02 PROCEDURE — 84157 ASSAY OF PROTEIN OTHER: CPT | Performed by: STUDENT IN AN ORGANIZED HEALTH CARE EDUCATION/TRAINING PROGRAM

## 2024-11-02 RX ORDER — MAGNESIUM OXIDE 400 MG/1
400 TABLET ORAL EVERY 4 HOURS
Status: COMPLETED | OUTPATIENT
Start: 2024-11-02 | End: 2024-11-02

## 2024-11-02 RX ORDER — POTASSIUM CHLORIDE 750 MG/1
20 TABLET, EXTENDED RELEASE ORAL ONCE
Status: COMPLETED | OUTPATIENT
Start: 2024-11-02 | End: 2024-11-02

## 2024-11-02 RX ORDER — POLYETHYLENE GLYCOL 3350 17 G/17G
17 POWDER, FOR SOLUTION ORAL 2 TIMES DAILY
Status: DISCONTINUED | OUTPATIENT
Start: 2024-11-02 | End: 2024-11-16 | Stop reason: HOSPADM

## 2024-11-02 RX ORDER — ALBUMIN (HUMAN) 12.5 G/50ML
50 SOLUTION INTRAVENOUS ONCE
Status: COMPLETED | OUTPATIENT
Start: 2024-11-02 | End: 2024-11-02

## 2024-11-02 RX ADMIN — MAGNESIUM OXIDE TAB 400 MG (241.3 MG ELEMENTAL MG) 400 MG: 400 (241.3 MG) TAB at 07:54

## 2024-11-02 RX ADMIN — ACETAMINOPHEN 650 MG: 325 TABLET, FILM COATED ORAL at 19:59

## 2024-11-02 RX ADMIN — ALBUMIN HUMAN 50 G: 0.25 SOLUTION INTRAVENOUS at 12:01

## 2024-11-02 RX ADMIN — ASPIRIN 81 MG CHEWABLE TABLET 81 MG: 81 TABLET CHEWABLE at 07:53

## 2024-11-02 RX ADMIN — Medication 950 MG: at 07:53

## 2024-11-02 RX ADMIN — HEPARIN, PORCINE (PF) 10 UNIT/ML INTRAVENOUS SYRINGE 5 ML: at 04:27

## 2024-11-02 RX ADMIN — METOPROLOL SUCCINATE 125 MG: 25 TABLET, EXTENDED RELEASE ORAL at 07:53

## 2024-11-02 RX ADMIN — PREDNISONE 5 MG: 5 TABLET ORAL at 07:53

## 2024-11-02 RX ADMIN — CEFTRIAXONE SODIUM 2 G: 2 INJECTION, POWDER, FOR SOLUTION INTRAMUSCULAR; INTRAVENOUS at 14:17

## 2024-11-02 RX ADMIN — INSULIN ASPART 1 UNITS: 100 INJECTION, SOLUTION INTRAVENOUS; SUBCUTANEOUS at 12:23

## 2024-11-02 RX ADMIN — MAGNESIUM OXIDE TAB 400 MG (241.3 MG ELEMENTAL MG) 400 MG: 400 (241.3 MG) TAB at 12:01

## 2024-11-02 RX ADMIN — OXYCODONE HYDROCHLORIDE 10 MG: 5 TABLET ORAL at 04:12

## 2024-11-02 RX ADMIN — METHOCARBAMOL 500 MG: 500 TABLET ORAL at 18:32

## 2024-11-02 RX ADMIN — POTASSIUM CHLORIDE 20 MEQ: 750 TABLET, EXTENDED RELEASE ORAL at 07:53

## 2024-11-02 RX ADMIN — OXYCODONE HYDROCHLORIDE 10 MG: 5 TABLET ORAL at 20:00

## 2024-11-02 RX ADMIN — PANTOPRAZOLE SODIUM 40 MG: 40 TABLET, DELAYED RELEASE ORAL at 07:53

## 2024-11-02 RX ADMIN — POLYETHYLENE GLYCOL 3350 17 G: 17 POWDER, FOR SOLUTION ORAL at 08:26

## 2024-11-02 RX ADMIN — OXYCODONE HYDROCHLORIDE 10 MG: 5 TABLET ORAL at 14:17

## 2024-11-02 RX ADMIN — ACETAMINOPHEN 650 MG: 325 TABLET, FILM COATED ORAL at 04:12

## 2024-11-02 NOTE — PROCEDURES
[facility]  CAPS PROCEDURE NOTE  Date of Admission:  10/29/2024  Consult Requested by: Medicine  Reason for Consult: diagnostic evaluation of ascites and management of symptomatic ascites    Indication/HPI: SBP    Pre-Procedure Diagnosis: Ascites    Post-Procedure Diagnosis: Ascites    Risk Assessment: Average risk, Technically straightforward; patient's anticoagulation has been held according to guidelines based on the agent and platelets and coags are within guidelines    Procedure Outcome:  Diagnostic paracentesis performed and Therapeutic paracentesis performed with 2.5 liters of ascites removed.   See additional procedure details below.    The primary covering service should follow up and address any lab results as appropriate.    Mata Koo MD  [facility]  Securely message with Vocera (more info)  Text page via [employer] Paging/Directory   See signed in provider for up to date coverage information      [facility]    Procedure: Abdominal paracentesis    Date/Time: 11/2/2024 11:35 AM    Performed by: Mata Koo MD  Authorized by: Mata Koo MD      UNIVERSAL PROTOCOL   Site Marked: Yes  Prior Images Obtained and Reviewed:  Yes  Required items: Required blood products, implants, devices and special equipment available    Patient identity confirmed:  Verbally with patient and arm band  NA - No sedation, light sedation, or local anesthesia  Confirmation Checklist:  Correct equipment/implants were available, patient's identity using two indicators, relevant allergies and procedure was appropriate and matched the consent or emergent situation  Time out: Immediately prior to the procedure a time out was called    Universal Protocol: the Joint Commission Universal Protocol was followed    Preparation: Patient was prepped and draped in usual sterile  fashion       ANESTHESIA    Local Anesthetic:  Lidocaine 1% without epinephrine      SEDATION    Patient Sedated: No      PROCEDURE    Patient Tolerance:  Patient tolerated the procedure well with no immediate complications  Length of time physician/provider present for 1:1 monitoring during sedation: 0      POC US Guide for Paracentesis     Impression  US Indication: decompensated liver disease    Limited abdominal ultrasound was performed and demonstrated an adequate fluid collection on the left side of the abdomen.    Doppler of the skin demonstrated an area at this site without significant vasculature.  A paracentesis at this site was subsequently performed.    Mata Koo MD

## 2024-11-02 NOTE — PROGRESS NOTES
1:30 PM: MSW attempted to meet with pt x1. Pt unable to visit due to fatigue. MSW to continue to follow for safe discharge planning assistance.     2:30 PM: MSW attempted to meet with pt x1. Pt declined visit as he was on the phone. MSW to continue to follow for safe discharge planning assistance.

## 2024-11-02 NOTE — PROGRESS NOTES
0700 - 1930    Neuro: A&O x4, sometimes forgetful  Cardiac: SR. BP low in early part of shift, stabilized in normal range after admin of albumin  Resp: RA, denies SOB  GI/: Frequent urine output in urinal, last BM on 10/28  Diet: 2g NA restriction, 2L fluid restriction  Activity: Assist of 2 with walker. Ambulated in hallway this afternoon and up to the recliner  Pain: Pt endorses pain in both hips during movement  Skin: BLE lymph wraps c/o OT. 3 quarter sized pressure wounds on coccyx, mepi changed and saline rinsed in AM  LDA's: R port-a cath hep locked, L PIV SL. Both CDI        Plan:  Continue with plan of care, notify team of any changes

## 2024-11-02 NOTE — PROGRESS NOTES
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      # New Ascites complicated by SBP   # RENNY (baseline Cr 1)  Patient with new onset ascites; diagnostic fluid studies consistent with portal hypertensive ascites 2/2 cirrhosis and cell ct c.w. SBP. Ceftriaxone 2 g daily started 10/30/24, s/p one time albumin 25% 50 g for SBP. Gram stain, culture, and cytology not collected with 10/30 paracentesis. Cr still elevated at 1.7, from baseline 1. Currently on diuretics for ADHF, however echo showed hyperkinetic - potentially from cirrhosis. Given creatinine above baseline and SBP, would recommend holding off on diuresis and keeping goal I/O of balance. Patient also has episodes of hypotension 80/40, would recommend colloid to promote renal perfusion with either 25% albumin or PRC given anemia.      #. Decompensated Cirrhosis   Primary Hepatologist: Dr. Renteria   Etiology: HCV/MASLD   Ascites: + clinically.   - hold PTA 2mg Bumex and 50mg spironolactone  - SBP history: + hx (dx 10/30/24)   Hepatic encephalopathy: PTA lactulose and rifaximin  EV: EGD 5/2023 without varices   TIPS: n/a   PV: Patent on US 8/19/24   HCC: CT A/P 10/18/24 without concerning lesions       RECOMMENDATIONS:  - recommend holding off on diuresis and keeping goal I/O of balance - tentative plan to resume tomorrow  - Agree with paracentesis today, would give 25 grams 25% albumin with this  - continue ceftriaxone 2 g  daily for SBP    Thank you for involving us in this patient's care. Please do not hesitate to contact the GI service with any questions or concerns.     Dr. Gala Stout MD  Transplant Hepatology    _______________________________________________________________      Subjective: Nursing notes and 24hr events reviewed.     Feels like fluid overload is a little better    ROS:   4 pt ROS negative unless noted in subjective.     Objective:  Blood pressure 107/42, pulse 66, temperature 98  F (36.7  C), temperature source Oral, resp. rate 18, weight 107.7 kg (237 lb 6.4 oz), SpO2 98%.    General: male sitting up in chair. Appears comfortable.  Answers appropriately.    Lungs: on RA, comfortable  Abdomen: distended  Extremities: pitting edema 1+, erythematous rash Rt anterior leg  Musculoskeletal: No gross deformity.  Skin: No jaundice or rash on exposed skin.  Mental status/Psych: A&O. Asks/answers questions appropriately. Pleasant, interactive.    Date 10/31/24 0700 - 11/01/24 0659   Shift 3762-5373 9774-5822 8570-7155 24 Hour Total   INTAKE   I.V. 36.13   36.13   Shift Total(mL/kg) 36.13(0.31)   36.13(0.31)   OUTPUT   Urine 725   725   Shift Total(mL/kg) 725(6.14)   725(6.14)   Weight (kg) 118.07 118.07 118.07 118.07         LABS:  [facility]  Recent Labs   Lab 11/02/24  0816 11/02/24  0418 11/01/24  2151 11/01/24  1658 11/01/24  0828 11/01/24  0420 10/31/24  1826 10/31/24  1445   NA  --  142  --  140  --  141  --  140   POTASSIUM  --  3.8  --  4.2  --  3.7  --  4.2   CHLORIDE  --  105  --  104  --  104  --  104   SOLEDAD  --  8.1*  --  8.3*  --  8.2*  --  8.3*   CO2  --  28  --  28  --  29  --  27   BUN  --  50.0*  --  52.5*  --  52.8*  --  54.2*   CR  --  1.63*  --  1.67*  --  1.69*  --  1.63*   * 129* 166* 151*   < > 111*   < > 164*    < > = values in this interval not displayed.     CBC  Recent Labs   Lab 11/02/24  0418 11/01/24  0420 10/31/24  1602 10/31/24  0530 10/30/24  1629 10/30/24  0833   WBC 7.6 6.7  --  2.9*   --  1.8*   RBC 2.57* 2.58*  --  2.10*  --  1.98*   HGB 8.3* 8.5*   < > 6.9*   < > 6.8*   HCT 26.4* 26.1*  --  21.2*  --  21.0*   * 101*  --  101*  --  106*   MCH 32.3 32.9  --  32.9  --  34.3*   MCHC 31.4* 32.6  --  32.5  --  32.4   RDW 22.5* 23.2*  --  23.4*  --  21.2*   PLT 44* 42*  --  36*  --  29*    < > = values in this interval not displayed.     INRNo lab results found in last 7 days.  LFTs  Recent Labs   Lab 11/02/24  0418 11/01/24  0420 10/31/24  0530 10/30/24  0833   ALKPHOS 142 120 113 124   AST 29 25 25 26   ALT 11 10 12 13   BILITOTAL 0.7 0.8 1.1 0.9   PROTTOTAL 4.7* 4.5* 4.5* 4.5*   ALBUMIN 2.9* 2.6* 2.7* 2.5*      PANCNo lab results found in last 7 days.

## 2024-11-02 NOTE — PLAN OF CARE
"D AVSS with sat's 95% on room air. Heart regular and lungs decreased in bases otherwise clear. Voiced c/o right hip and leg pain which was controlled with Tylenol and Oxycodone which reports is from \"bone cancer.\" He remains a/o x 4 with minor forgetfulness. Blood glucose check have been stable with no need for insulin correction. Voiding adequate amounts and bladder scan was negative. On PM's last night he was able to transfer from bed to chair with an assistance of 2 staff and walker. Weight is down 1.8 kg from yesterday.   I Vital's, assessment and med's per order. Continue to work on mobility and pain control. Labs sent and needs K+/mag replaced per protocol  A Resting in bed with call light in reach. Slept well for the most of the night.   P Continue to monitor and update MD with changes.   "

## 2024-11-02 NOTE — PROGRESS NOTES
[facility]    Medicine Progress Note - Hospitalist Service, GOLD TEAM 7    Date of Admission:  10/29/2024    Assessment & Plan   Gucci Logan is a 74 year old male with a past medical history of HCM w/ EDGAR, w/ valsalva LVOTO gradient, HFpEF, HTN, Metastatic Prostate Cancer, Decompensated Cirrhosis 2/2 HCV c/b ascites, SBP, CKD Stage III, T2DM, class II obesity. He was a direct admit and was admitted on 10/29/24 for escalation of his outpatient diuretic regimen ISO HCM, unable to make frequent outpatient labs and appts d/t mobility issues, hence the admit for safety. Initially admitted to the Cardiology service, but hospital course c/b SBP after which he was transferred to the Medicine service on 10/31/24.     Spontaneous Bacterial Peritonitis  Cirrhosis 2/2 HCV, Newly Decompensated, c/b ascites, SBP  Hx of cirrhosis 2/2 HCV (tx as below). PTA pt's cirrhosis had largely been compensated, but since beginning chemotherapy for metastatic prostate cancer (see below), he has had new onset ascites this hospitalization. Paracentesis performed 10/30 showed ascitic fluid was c/w SBP, and he was started on Ceftriaxone at that time, and given 50g 25% albumin, too. GI following this hospitalization and they recommend holding off on diuresis as they feel he is intravascularly depleted. Of note, primary hepatologist is Dr. Renteria. Last EGD 05/2023 w/o e/o varices. On transfer to Medicine service, reporting minimal abdominal pain, otherwise feeling stable. VSS, afebrile.  - GI consulted, appreciate assistance    - Per GI recommendations, diuresis currently being held (Spironolactone, Bumex drip)   - Goal I/O to be as close to net as possible in d/w GI today (currently -3L since admission)   - Hold off on paracentesis for today given rise in Cr, at next one order gram stain, cultures, & cytology   - Continue CTX 2g daily for now, started 10/30, para 11/2  - Consider plt transfusion  if plts <20k    RENNY, suspect prerenal  CKD Stage III  Baseline Cr ~0.8-1 recently. On review of prior BMPs, appears that his Cr periodically rises during periods c/w intravascular depletion ISO. The past week, Cr has been ~1.2-1.7, and today is 1.69. This correlates w/ more aggressive diuresis for HCM (see below), and paracentesis, likely resulting in decreased intravascular volume and worsening renal function/prerenal RENNY. Possible Cardiorenal picture, too. K and Na WNL. Mg low as below.  - Holding off on further diuresis for now as above  - Avoid nephrotoxic agents as best as possible  - Trend renal function daily for now  -  to receive albumin on 11/1, 11/2 with para     Hypomagnesemia   Mag 1.4 today. ISO RENNY as above. Got 4g Mag Sulfate per RN-drive protocol today.  - Continue RN-driven Mag replacement protocol, and continue potassium protocol, too    HCM with EDGAR with Valsalva LVOTO Gradient  Acute-on-Chronic HFpEF  HTN  Longstanding hx of HCM, recently established care w/ [facility] for [facility]. Has been struggling w/ worsening BLE edema, increased abdominal distension (weeping ascites), from poorly controlled hypervolemia. PTA diuretic regimen w/ Spironolactone was deemed inadequate, and given his difficulties w/ mobility at home and need for frequent appts/labs w/ titration of OP diuresis, decision was made to directly admit him instead for more aggressive titration of diuretic regimen w/ close cardiac monitoring. Started on Bumex drip here by Cards. Repeat ECHO 10/30/24 showing hyperkinetic w/ EF 65-70%, dynamic outflow tract obstruction, peak gradient 119mmHg at rest, grade II moderate diastolic dysfunction. Transferred to Medicine service 10/31 given SBP, but Cardiology continues to manage volume status as below.  - Cardiology involved, appreciate assistance   - Hold Spironolactone and Bumex drip for now given suspected volume depletion as evidenced by rising Cr and net -3L since admission   - Hold PTA  Valsartan 160mg daily w/ low BP, worsening LVOT gradient   - reduced PTA Metoprolol succinate 150mg daily to 125 mg daily   - Avoid vasodilators   - Goal K >4 and Mg >2   - Lymphedema consulted   - BID BMP for now   - Strict I/Os   - Daily standing weights (per pt, a 'good' weight for him is 230lbs, and is 260lbs today)  - Continuous O2 monitoring for now     Metastatic Prostate Cancer  Chronic Pancytopenia   Initially diagnosed earlier this year incidentally following a fall at home, fracturing his L femur fracture. Had ORIF 05/24/24 and curretage samples c/w adenocarcinoma of prostate origin. Has been found to have numerous areas of mets. Has since followed w/ Oncology as an OP, last saw 10/24/24. PTA on Daralutamide BID, Docetaxel (C4D1 was on 10/24/24) and Leuprolide Q2zrwcro (last 9/5/24). Also gets Neulasta, last 10/25. Has chronic pancytopenia, likely r/t multiple comorbidities (cirrhosis, CKD) and iatrogenic (chemotherapy). Baseline hgb ~8-9 recently, and had drift today down to 6.9, but e/o active bleeding. Remains HDS.   - Oncology consulted here 10/31 and recommended to continue Daralutamide, they signed off  - Continue PTA Prednisone   - Transfuse for Hgb <7     Type 2 Diabetes Mellitus, non-insulin-dependent, well-controlled  Last A1c 5.8% on 10/29/24. PTA Metformin. BG checks past 24 hours are all within goal (<180).   - Hold PTA Metformin given RENNY as above  - Continue medium sliding scale as started on admission  - BG checks TID AC + at bedtime + 0200  - Hypoglycemia protocol          Diet: 2 Gram Sodium Diet  Snacks/Supplements Adult: Ensure Enlive; Between Meals    DVT Prophylaxis: PCDs for now  Rankin Catheter: Not present  Lines: PRESENT      Port a Cath 08/12/24 Single Lumen Right-Site Assessment: WDL      Cardiac Monitoring: ACTIVE order. Indication: Acute decompensated heart failure (48 hours)  Code Status: Full Code      Clinically Significant Risk Factors               # Hypoalbuminemia:  "Lowest albumin = 2.5 g/dL at 10/30/2024  8:33 AM, will monitor as appropriate   # Thrombocytopenia: Lowest platelets = 42 in last 2 days, will monitor for bleeding    # Acute Kidney Injury, unspecified: based on a >150% or 0.3 mg/dL increase in last creatinine compared to past 90 day average, will monitor renal function  # Hypertension: Noted on problem list           # Obesity: Estimated body mass index is 34.06 kg/m  as calculated from the following:    Height as of 10/24/24: 1.778 m (5' 10\").    Weight as of this encounter: 107.7 kg (237 lb 6.4 oz)., PRESENT ON ADMISSION       # Financial/Environmental Concerns:           Disposition Plan     Medically Ready for Discharge: Anticipated in 5+ Days           The patient's care was discussed with the Bedside Nurse and Patient.    Jordan Grimes, DO  Hospitalist Service, GOLD TEAM [address] [place] [address]  Securely message with [employer] (more info)  Text page via [employer] Paging/Directory   See signed in provider for up to date coverage information  ______________________________________________________________________    Interval History     No acute events overnight. Feeling pretty good today. Abdomen still distended so will get para. Working on mobility because he wants to go home. Possibly to restart diuretics tomorrow.     Physical Exam   Vital Signs: Temp: 98  F (36.7  C) Temp src: Oral BP: 107/42 Pulse: 66   Resp: 18 SpO2: 98 % O2 Device: None (Room air)    Weight: 237 lbs 6.4 oz    Constitutional: awake, alert, cooperative, no apparent distress, and appears stated age  Respiratory: No increased work of breathing, good air exchange, clear to auscultation bilaterally, no crackles or wheezing  Cardiovascular: regular rate and rhythm, normal S1 and S2, no S3, no S4, and no murmur noted  GI: normal bowel sounds, soft, non-distended, and non-tender  Musculoskeletal: 1+ pitting edema to BLEs, no other areas of edema. No " deformities.  Neurologic: Moving all extremities equally and spontaneously    Medical Decision Making       55 MINUTES SPENT BY ME on the date of service doing chart review, history, exam, documentation & further activities per the note.      Data     I have personally reviewed the following data over the past 24 hrs:    7.6  \   8.3 (L)   / 44 (LL)     142 105 50.0 (H) /  140 (H)   3.8 28 1.63 (H) \     ALT: 11 AST: 29 AP: 142 TBILI: 0.7   ALB: 2.9 (L) TOT PROTEIN: 4.7 (L) LIPASE: N/A       Imaging results reviewed over the past 24 hrs:   Recent Results (from the past 24 hours)   POC US Guide for Paracentesis    Impression    US Indication: decompensated liver disease    Limited abdominal ultrasound was performed and demonstrated an adequate fluid collection on the left side of the abdomen.     Doppler of the skin demonstrated an area at this site without significant vasculature.  A paracentesis at this site was subsequently performed.    Mata Koo MD       Recent Labs   Lab 11/02/24  1152 11/02/24  0816 11/02/24  0418 11/01/24  2151 11/01/24  1658 11/01/24  0828 11/01/24  0420 10/31/24  1826 10/31/24  1602 10/31/24  0746 10/31/24  0530   WBC  --   --  7.6  --   --   --  6.7  --   --   --  2.9*   HGB  --   --  8.3*  --   --   --  8.5*  --  8.2*  --  6.9*   MCV  --   --  103*  --   --   --  101*  --   --   --  101*   PLT  --   --  44*  --   --   --  42*  --   --   --  36*   NA  --   --  142  --  140  --  141  --   --    < > 143   POTASSIUM  --   --  3.8  --  4.2  --  3.7  --   --    < > 3.6   CHLORIDE  --   --  105  --  104  --  104  --   --    < > 107   CO2  --   --  28  --  28  --  29  --   --    < > 27   BUN  --   --  50.0*  --  52.5*  --  52.8*  --   --    < > 57.1*   CR  --   --  1.63*  --  1.67*  --  1.69*  --   --    < > 1.69*   ANIONGAP  --   --  9  --  8  --  8  --   --    < > 9   SOLEDAD  --   --  8.1*  --  8.3*  --  8.2*  --   --    < > 8.3*   * 113* 129*   < > 151*   < > 111*   < >  --     < > 95   ALBUMIN  --   --  2.9*  --   --   --  2.6*  --   --   --  2.7*   PROTTOTAL  --   --  4.7*  --   --   --  4.5*  --   --   --  4.5*   BILITOTAL  --   --  0.7  --   --   --  0.8  --   --   --  1.1   ALKPHOS  --   --  142  --   --   --  120  --   --   --  113   ALT  --   --  11  --   --   --  10  --   --   --  12   AST  --   --  29  --   --   --  25  --   --   --  25    < > = values in this interval not displayed.

## 2024-11-03 ENCOUNTER — APPOINTMENT (OUTPATIENT)
Dept: OCCUPATIONAL THERAPY | Facility: CLINIC | Age: 74
DRG: 291 | End: 2024-11-03
Attending: INTERNAL MEDICINE
Payer: COMMERCIAL

## 2024-11-03 LAB
ALBUMIN SERPL BCG-MCNC: 2.8 G/DL (ref 3.5–5.2)
ALP SERPL-CCNC: 120 U/L (ref 40–150)
ALT SERPL W P-5'-P-CCNC: 11 U/L (ref 0–70)
ANION GAP SERPL CALCULATED.3IONS-SCNC: 10 MMOL/L (ref 7–15)
AST SERPL W P-5'-P-CCNC: 24 U/L (ref 0–45)
BILIRUB DIRECT SERPL-MCNC: 0.25 MG/DL (ref 0–0.3)
BILIRUB SERPL-MCNC: 0.7 MG/DL
BUN SERPL-MCNC: 44.8 MG/DL (ref 8–23)
CALCIUM SERPL-MCNC: 7.7 MG/DL (ref 8.8–10.4)
CHLORIDE SERPL-SCNC: 105 MMOL/L (ref 98–107)
CREAT SERPL-MCNC: 1.56 MG/DL (ref 0.67–1.17)
EGFRCR SERPLBLD CKD-EPI 2021: 47 ML/MIN/1.73M2
ERYTHROCYTE [DISTWIDTH] IN BLOOD BY AUTOMATED COUNT: 22.1 % (ref 10–15)
GLUCOSE BLDC GLUCOMTR-MCNC: 108 MG/DL (ref 70–99)
GLUCOSE BLDC GLUCOMTR-MCNC: 112 MG/DL (ref 70–99)
GLUCOSE BLDC GLUCOMTR-MCNC: 140 MG/DL (ref 70–99)
GLUCOSE BLDC GLUCOMTR-MCNC: 156 MG/DL (ref 70–99)
GLUCOSE BLDC GLUCOMTR-MCNC: 185 MG/DL (ref 70–99)
GLUCOSE SERPL-MCNC: 104 MG/DL (ref 70–99)
HCO3 SERPL-SCNC: 28 MMOL/L (ref 22–29)
HCT VFR BLD AUTO: 25.7 % (ref 40–53)
HGB BLD-MCNC: 8 G/DL (ref 13.3–17.7)
MAGNESIUM SERPL-MCNC: 1.9 MG/DL (ref 1.7–2.3)
MCH RBC QN AUTO: 32 PG (ref 26.5–33)
MCHC RBC AUTO-ENTMCNC: 31.1 G/DL (ref 31.5–36.5)
MCV RBC AUTO: 103 FL (ref 78–100)
PLATELET # BLD AUTO: 47 10E3/UL (ref 150–450)
POTASSIUM SERPL-SCNC: 3.9 MMOL/L (ref 3.4–5.3)
PROT SERPL-MCNC: 4.5 G/DL (ref 6.4–8.3)
RBC # BLD AUTO: 2.5 10E6/UL (ref 4.4–5.9)
SODIUM SERPL-SCNC: 143 MMOL/L (ref 135–145)
WBC # BLD AUTO: 11.4 10E3/UL (ref 4–11)

## 2024-11-03 PROCEDURE — 85014 HEMATOCRIT: CPT | Performed by: NURSE PRACTITIONER

## 2024-11-03 PROCEDURE — 97140 MANUAL THERAPY 1/> REGIONS: CPT | Mod: GO | Performed by: OCCUPATIONAL THERAPIST

## 2024-11-03 PROCEDURE — 250N000011 HC RX IP 250 OP 636: Performed by: NURSE PRACTITIONER

## 2024-11-03 PROCEDURE — 250N000013 HC RX MED GY IP 250 OP 250 PS 637: Performed by: INTERNAL MEDICINE

## 2024-11-03 PROCEDURE — 84155 ASSAY OF PROTEIN SERUM: CPT | Performed by: NURSE PRACTITIONER

## 2024-11-03 PROCEDURE — 250N000013 HC RX MED GY IP 250 OP 250 PS 637: Performed by: NURSE PRACTITIONER

## 2024-11-03 PROCEDURE — 120N000005 HC R&B MS OVERFLOW UMMC

## 2024-11-03 PROCEDURE — 85041 AUTOMATED RBC COUNT: CPT | Performed by: NURSE PRACTITIONER

## 2024-11-03 PROCEDURE — 84075 ASSAY ALKALINE PHOSPHATASE: CPT | Performed by: NURSE PRACTITIONER

## 2024-11-03 PROCEDURE — 250N000011 HC RX IP 250 OP 636: Performed by: STUDENT IN AN ORGANIZED HEALTH CARE EDUCATION/TRAINING PROGRAM

## 2024-11-03 PROCEDURE — 99233 SBSQ HOSP IP/OBS HIGH 50: CPT | Performed by: STUDENT IN AN ORGANIZED HEALTH CARE EDUCATION/TRAINING PROGRAM

## 2024-11-03 PROCEDURE — 83735 ASSAY OF MAGNESIUM: CPT | Performed by: STUDENT IN AN ORGANIZED HEALTH CARE EDUCATION/TRAINING PROGRAM

## 2024-11-03 PROCEDURE — 250N000012 HC RX MED GY IP 250 OP 636 PS 637: Performed by: NURSE PRACTITIONER

## 2024-11-03 PROCEDURE — 99232 SBSQ HOSP IP/OBS MODERATE 35: CPT | Mod: GC | Performed by: STUDENT IN AN ORGANIZED HEALTH CARE EDUCATION/TRAINING PROGRAM

## 2024-11-03 PROCEDURE — 250N000013 HC RX MED GY IP 250 OP 250 PS 637: Performed by: STUDENT IN AN ORGANIZED HEALTH CARE EDUCATION/TRAINING PROGRAM

## 2024-11-03 PROCEDURE — 80048 BASIC METABOLIC PNL TOTAL CA: CPT | Performed by: NURSE PRACTITIONER

## 2024-11-03 PROCEDURE — 80053 COMPREHEN METABOLIC PANEL: CPT | Performed by: NURSE PRACTITIONER

## 2024-11-03 RX ORDER — MAGNESIUM SULFATE HEPTAHYDRATE 40 MG/ML
2 INJECTION, SOLUTION INTRAVENOUS ONCE
Status: COMPLETED | OUTPATIENT
Start: 2024-11-03 | End: 2024-11-03

## 2024-11-03 RX ADMIN — OXYCODONE HYDROCHLORIDE 10 MG: 5 TABLET ORAL at 20:49

## 2024-11-03 RX ADMIN — PIPERACILLIN SODIUM AND TAZOBACTAM SODIUM 3.38 G: 3; .375 INJECTION, SOLUTION INTRAVENOUS at 23:51

## 2024-11-03 RX ADMIN — PIPERACILLIN SODIUM AND TAZOBACTAM SODIUM 3.38 G: 3; .375 INJECTION, SOLUTION INTRAVENOUS at 17:08

## 2024-11-03 RX ADMIN — Medication 950 MG: at 08:38

## 2024-11-03 RX ADMIN — HEPARIN, PORCINE (PF) 10 UNIT/ML INTRAVENOUS SYRINGE 5 ML: at 04:27

## 2024-11-03 RX ADMIN — PREDNISONE 5 MG: 5 TABLET ORAL at 08:34

## 2024-11-03 RX ADMIN — INSULIN ASPART 1 UNITS: 100 INJECTION, SOLUTION INTRAVENOUS; SUBCUTANEOUS at 18:21

## 2024-11-03 RX ADMIN — HEPARIN, PORCINE (PF) 10 UNIT/ML INTRAVENOUS SYRINGE 5 ML: at 19:56

## 2024-11-03 RX ADMIN — ACETAMINOPHEN 650 MG: 325 TABLET, FILM COATED ORAL at 12:08

## 2024-11-03 RX ADMIN — INSULIN ASPART 1 UNITS: 100 INJECTION, SOLUTION INTRAVENOUS; SUBCUTANEOUS at 13:25

## 2024-11-03 RX ADMIN — OXYCODONE HYDROCHLORIDE 10 MG: 5 TABLET ORAL at 01:22

## 2024-11-03 RX ADMIN — PANTOPRAZOLE SODIUM 40 MG: 40 TABLET, DELAYED RELEASE ORAL at 08:38

## 2024-11-03 RX ADMIN — ACETAMINOPHEN 650 MG: 325 TABLET, FILM COATED ORAL at 01:22

## 2024-11-03 RX ADMIN — OXYCODONE HYDROCHLORIDE 10 MG: 5 TABLET ORAL at 12:08

## 2024-11-03 RX ADMIN — MAGNESIUM SULFATE HEPTAHYDRATE 2 G: 2 INJECTION, SOLUTION INTRAVENOUS at 06:38

## 2024-11-03 RX ADMIN — ACETAMINOPHEN 650 MG: 325 TABLET, FILM COATED ORAL at 20:48

## 2024-11-03 RX ADMIN — METOPROLOL SUCCINATE 125 MG: 25 TABLET, EXTENDED RELEASE ORAL at 08:34

## 2024-11-03 RX ADMIN — ASPIRIN 81 MG CHEWABLE TABLET 81 MG: 81 TABLET CHEWABLE at 08:38

## 2024-11-03 ASSESSMENT — ACTIVITIES OF DAILY LIVING (ADL)
ADLS_ACUITY_SCORE: 0
DEPENDENT_IADLS:: CLEANING;SHOPPING;TRANSPORTATION
ADLS_ACUITY_SCORE: 0

## 2024-11-03 NOTE — PLAN OF CARE
Shift: [id number]  Change: Mg replaced IV, AM recheck 11/4    Neuro: A&Ox4. Calm, cooperative, pleasant. Ptnt expresses frustration with change in functional status  Cardiac: Telemetry NSR. VSS  Respiratory: RA, tolerating well.  GI/: Voiding spontaneously and w/o difficulty in urinal, BM this shift.  Diet/Appetite: Diet: 2 Gram Sodium Diet  Snacks/Supplements Adult: Ensure Enlive; Between Meals      appetite good  Activity: Up with A2, walker, and GB. Up to commode this shift; RN encouraged chair today.   Pain: Pain in L hip  Skin: No new deficits noted. Scattered bruising  Lines: PIV- SL  PRN: Tylenol, oxycodone      Goal Outcome Evaluation:      Plan of Care Reviewed With: patient    Overall Patient Progress: improvingOverall Patient Progress: improving    Outcome Evaluation: Good UO, ptnt weight shifting frequently in bed.

## 2024-11-03 NOTE — CONSULTS
Care Management Initial Consult    General Information  Assessment completed with: Patient,    Type of CM/SW Visit: Initial Assessment    Primary Care Provider verified and updated as needed: Yes   Readmission within the last 30 days: no previous admission in last 30 days      Reason for Consult: discharge planning, other (see comments) (TCU recommendations)  Advance Care Planning: Advance Care Planning Reviewed: other (see comments) (Pt reports he is working with an attourney to complete a living will. He declined a blank HCD to have completed an on file in the meantime.)          Communication Assessment  Patient's communication style: spoken language (English or Bilingual)    Hearing Difficulty or Deaf: no   Wear Glasses or Blind: yes    Cognitive  Cognitive/Neuro/Behavioral: WDL                      Living Environment:   People in home: alone     Current living Arrangements: condominium      Able to return to prior arrangements: no  Living Arrangement Comments:  (TCU recommended by PT. Pt also expressed concerns for returning home as he would need to navigate steps and lives in a home with small doorways.)    Family/Social Support:  Care provided by: self, homecare agency, friend  Provides care for: no one  Marital Status: Single  Support system: Other (specify) (Friend, Mac)          Description of Support System: Supportive    Support Assessment: Adequate social supports, Other (see comments) (Difficult to determine as pt describes people in his life, though describes minimally accepted support from them.)    Current Resources:   Patient receiving home care services: Yes  Skilled Home Care Services: Home Health Aid     Community Resources:    Equipment currently used at home: wheelchair, manual, commode chair, cane, straight, tub bench, walker, standard  Supplies currently used at home: None    Employment/Financial:  Employment Status: retired        Financial Concerns: none   Referral to Financial Worker:  No       Does the patient's insurance plan have a 3 day qualifying hospital stay waiver?  Yes     Which insurance plan 3 day waiver is available? Alternative insurance waiver    Will the waiver be used for post-acute placement? Undetermined at this time    Lifestyle & Psychosocial Needs:  Social Drivers of Health     Food Insecurity: Low Risk  (10/29/2024)    Food Insecurity     Within the past 12 months, did you worry that your food would run out before you got money to buy more?: No     Within the past 12 months, did the food you bought just not last and you didn t have money to get more?: No   Depression: Not at risk (1/5/2024)    PHQ-2     PHQ-2 Score: 0   Housing Stability: Low Risk  (10/29/2024)    Housing Stability     Do you have housing? : Yes     Are you worried about losing your housing?: No   Tobacco Use: Medium Risk (10/24/2024)    Patient History     Smoking Tobacco Use: Former     Smokeless Tobacco Use: Never     Passive Exposure: Never   Financial Resource Strain: Low Risk  (10/29/2024)    Financial Resource Strain     Within the past 12 months, have you or your family members you live with been unable to get utilities (heat, electricity) when it was really needed?: No   Alcohol Use: Not on file   Transportation Needs: Low Risk  (10/29/2024)    Transportation Needs     Within the past 12 months, has lack of transportation kept you from medical appointments, getting your medicines, non-medical meetings or appointments, work, or from getting things that you need?: No   Physical Activity: Not on file   Interpersonal Safety: Low Risk  (10/29/2024)    Interpersonal Safety     Do you feel physically and emotionally safe where you currently live?: Yes     Within the past 12 months, have you been hit, slapped, kicked or otherwise physically hurt by someone?: No     Within the past 12 months, have you been humiliated or emotionally abused in other ways by your partner or ex-partner?: No   Stress: Not on file  "  Social Connections: Socially Integrated (8/6/2024)    Received from [facility]    Social Connections     Do you often feel lonely or isolated from those around you?: 0   Health Literacy: Not on file       Functional Status:  Prior to admission patient needed assistance:   Dependent ADLs:: Wheelchair-with assist  Dependent IADLs:: Cleaning, Shopping, Transportation  Assesssment of Functional Status: Needs placement in a SNF/TCF for rehabilitation    Mental Health Status:  Mental Health Status: Other (see comment) (Potentially some cognitive concerns as various story lines didn't always line up. Pt also struggled with answering direct questions and would become fixated on certain stories.)       Chemical Dependency Status:  Chemical Dependency Status: No Current Concerns             Values/Beliefs:  Spiritual, Cultural Beliefs, Sabianist Practices, Values that affect care: no               Discussed  Partnership in Safe Discharge Planning  document with patient/family: No    Additional Information:  MSW met with Canelo at bedside to complete initial assessment and provide education specific to TCU identification process. Canelo actively engaged in conversation and identified some psychosocial concerns for himself. He began the conversation with concerns to returning home as he wants \"to be in a place where I [Canelo] can really focus on getting stronger before going home.\" Canelo also explained that he previously had home care support, though found them to be expensive and unreliable. MSW provided education regarding PT recommendation for TCU and the next steps moving forward. Canelo detailed several stories of receiving care at [facility] as he felt they withheld cancer treatment from him and that the facility was charged too much for the care he needed. Canelo later detailed that he told [facility] that he had [facility] prior to admission when he did not. Canelo detailed several confusing stories " "relating to this care that seemed to have logical gaps. He also exhibited some elements of medical mistrust/trauma and paranoia when describing his previous care teams. He reports that his previous oncologist had lied to him for 7 years by not telling him that his cancer had metastasized from his prostate to bone cancer. He reports that his doctor was motivated to lie to him because he didn't like Canelo. MSW attempted to provided active listening, empathic response, and redirection. Canelo was relatively responsive to redirection, though continued to go on confusing tangents throughout visit. 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 This lack of clarity was also present in Canelo identifying his support system. He reports that his primary support system is his previous grad student Mac, though notes that Mac has his hands full and is not able to be involved in his life. Canelo notes that his family members are money seeking and that he obtained an  to create a will. He did identify that he has some friends in [place], [place] and may be open to seeking TCU placement closer to them. When asked about his mental health, Canelo notes that he has been struggling to cope with the fact that he may be close to end-of-life. Canelo was receptive to active listening and validation during this conversation. He notes coping with this best through practical, tangible tasks.     One referral sent to [facility] (see contact information below). Canelo notified of need for him to identify 5 additional TCUs he would like referrals sent to. He declined referral to be sent to  TCU as he worries other patients would look to him to therapize them.     [facility]  [address] [place] [address]  [phone]    Next Steps: Canelo to identify 5 additional TCU for SW team to send referrals to. SW team to continue to follow for partnership in safe discharge planning.     IVANIA Winters LGSW  11/3/2024       Social Work and Care Management Department       SEARCHABLE in [facility] - search SOCIAL WORK       [facility] (0800 - 1630) Saturday and Sunday     Units: 4A Vocera, 4C Vocera, & 4E Vocera        Units: 5A [id number] Vocera, 5A [id number] Vocera , BMT SW 1 BMT SW 2, BMT SW 3 & BMT SW 4  5C Off Service 5401 - 5416  5C Off Service [id number]     Units: 6A Vocera & 6B Vocera      Units: 6C Vocera     Units: 7A Vocera & 7B Vocera      Units: 7C Med Surg 7401 thru 7418 and 7C Med Surg 7502 thru 7521      Unit: [facility] ED Vocera & [facility] Obs Vocera     [facility] ([id number]) Saturday and Sunday      Units: 5 Ortho Vocera, 5 Med Surg Vocera & WB ED Vocera     Units: 6 Med Surg Vocera, 8 Med Surg Vocera, & 10 ICU Vocera      After hours Vocera [facility] and After Hours Vocera [facility]     Please NOTE changes to times below:    **Saturday & Sunday (1630 - 2030)    **Mon-Fri (4500-4195)     **[facility] Recognized Holidays  (1632-2061)    Units: ALL   - see above VOCERA links to units

## 2024-11-03 NOTE — PROGRESS NOTES
[facility]    Medicine Progress Note - Hospitalist Service, GOLD TEAM 7    Date of Admission:  10/29/2024    Assessment & Plan   Gucci Logan is a 74 year old male with a past medical history of HCM w/ EDGAR, w/ valsalva LVOTO gradient, HFpEF, HTN, Metastatic Prostate Cancer, Decompensated Cirrhosis 2/2 HCV c/b ascites, SBP, CKD Stage III, T2DM, class II obesity. He was a direct admit and was admitted on 10/29/24 for escalation of his outpatient diuretic regimen ISO HCM, unable to make frequent outpatient labs and appts d/t mobility issues, hence the admit for safety. Initially admitted to the Cardiology service, but hospital course c/b SBP after which he was transferred to the Medicine service on 10/31/24.     Spontaneous Bacterial Peritonitis  Cirrhosis 2/2 HCV, Newly Decompensated, c/b ascites, SBP  Hx of cirrhosis 2/2 HCV (tx as below). PTA pt's cirrhosis had largely been compensated, but since beginning chemotherapy for metastatic prostate cancer (see below), he has had new onset ascites this hospitalization. Paracentesis performed 10/30 showed ascitic fluid was c/w SBP, and he was started on Ceftriaxone at that time, and given 50g 25% albumin, too. GI following this hospitalization and they recommend holding off on diuresis as they feel he is intravascularly depleted. Of note, primary hepatologist is Dr. Renteria. Last EGD 05/2023 w/o e/o varices. On transfer to Medicine service, reporting minimal abdominal pain, otherwise feeling stable. VSS, afebrile.  - GI consulted, appreciate assistance    - Per GI recommendations, diuresis currently being held (Spironolactone, Bumex drip) will plan to start tomorrow if Cr stable    - Goal I/O to be as close to net as possible  (currently -3L since admission)   - paracentesis done 11/2 with albumin, cell count improving slightly    - was on ceftriaxone however given slow improvement of cell count in para fluid will switch to  zosyn.   - Consider plt transfusion if plts <20k    RENNY, suspect prerenal  CKD Stage III  Baseline Cr ~0.8-1 recently. On review of prior BMPs, appears that his Cr periodically rises during periods c/w intravascular depletion ISO. The past week, Cr has been ~1.2-1.7 peak during hospital stay has been 1.69. This correlates w/ more aggressive diuresis for HCM (see below), and paracentesis, likely resulting in decreased intravascular volume and worsening renal function/prerenal RENNY.  - Holding off on further diuresis for now as above  - Avoid nephrotoxic agents as best as possible  - Trend renal function daily for now  -  to receive albumin on 11/1, 11/2 with para     Hypomagnesemia   Mag 1.4 today. ISO RENNY as above. Got 4g Mag Sulfate per RN-drive protocol today.  - Continue RN-driven Mag replacement protocol, and continue potassium protocol, too    HCM with EDGAR with Valsalva LVOTO Gradient  Acute-on-Chronic HFpEF  HTN  Longstanding hx of HCM, recently established care w/ [facility] for HF. Has been struggling w/ worsening BLE edema, increased abdominal distension (weeping ascites), from poorly controlled hypervolemia. PTA diuretic regimen w/ Spironolactone was deemed inadequate, and given his difficulties w/ mobility at home and need for frequent appts/labs w/ titration of OP diuresis, decision was made to directly admit him instead for more aggressive titration of diuretic regimen w/ close cardiac monitoring. Started on Bumex drip here by Cards. Repeat ECHO 10/30/24 showing hyperkinetic w/ EF 65-70%, dynamic outflow tract obstruction, peak gradient 119mmHg at rest, grade II moderate diastolic dysfunction. Transferred to Medicine service 10/31 given SBP, but Cardiology continues to manage volume status as below.  - Cardiology involved, appreciate assistance   - Hold Spironolactone and Bumex drip for now given suspected volume depletion as evidenced by rising Cr and net -3L since admission   - Hold PTA Valsartan  160mg daily w/ low BP, worsening LVOT gradient   - reduced PTA Metoprolol succinate 150mg daily to 125 mg daily   - Avoid vasodilators   - Goal K >4 and Mg >2   - Lymphedema consulted   - BMP daily   - Strict I/Os   - Daily standing weights (per pt, a 'good' weight for him is 230lbs, and is 260lbs today)  - Continuous O2 monitoring for now     Metastatic Prostate Cancer  Chronic Pancytopenia   Initially diagnosed earlier this year incidentally following a fall at home, fracturing his L femur fracture. Had ORIF 05/24/24 and curretage samples c/w adenocarcinoma of prostate origin. Has been found to have numerous areas of mets. Has since followed w/ Oncology as an OP, last saw 10/24/24. PTA on Daralutamide BID, Docetaxel (C4D1 was on 10/24/24) and Leuprolide [id number] (last 9/5/24). Also gets Neulasta, last 10/25. Has chronic pancytopenia, likely r/t multiple comorbidities (cirrhosis, CKD) and iatrogenic (chemotherapy). Baseline hgb ~8-9 recently, and had drift today down to 6.9, but e/o active bleeding. Remains HDS.   - Oncology consulted here 10/31 and recommended to continue Daralutamide, they signed off  - Continue PTA Prednisone   - Transfuse for Hgb <7     Type 2 Diabetes Mellitus, non-insulin-dependent, well-controlled  Last A1c 5.8% on 10/29/24. PTA Metformin. BG checks past 24 hours are all within goal (<180).   - Hold PTA Metformin given RENNY as above  - Continue medium sliding scale as started on admission  - BG checks TID AC + at bedtime + 0200  - Hypoglycemia protocol          Diet: Snacks/Supplements Adult: Ensure Enlive; Between Meals  2 Gram Sodium Diet    DVT Prophylaxis: PCDs for now  Rankin Catheter: Not present  Lines: PRESENT      Port a Cath 08/12/24 Single Lumen Right-Site Assessment: WDL      Cardiac Monitoring: None  Code Status: Full Code      Clinically Significant Risk Factors               # Hypoalbuminemia: Lowest albumin = 2.5 g/dL at 10/30/2024  8:33 AM, will monitor as appropriate   #  "Thrombocytopenia: Lowest platelets = 44 in last 2 days, will monitor for bleeding     # Hypertension: Noted on problem list           # Obesity: Estimated body mass index is 34.01 kg/m  as calculated from the following:    Height as of 10/24/24: 1.778 m (5' 10\").    Weight as of this encounter: 107.5 kg (237 lb).        # Financial/Environmental Concerns: none         Disposition Plan     Medically Ready for Discharge: Anticipated in 5+ Days           The patient's care was discussed with the Bedside Nurse and Patient.    Jordan Grimes DO  Hospitalist Service, GOLD TEAM [address] [place] [address]  Securely message with [employer] (more info)  Text page via [employer] Paging/Directory   See signed in provider for up to date coverage information  ______________________________________________________________________    Interval History     Overall doing pretty well today. Does have some increased neuropathic pain in his feet but it is tolerable. Breathing stable. Does feel like his abdomen is getting more swollen. His UOP has decreased as well. Discussed with hepatology and switching abx to zosyn given persistent elevated PMNs in para fluid.    Physical Exam   Vital Signs: Temp: 98.1  F (36.7  C) Temp src: Oral BP: 101/43 Pulse: 72   Resp: (!) 35 SpO2: 96 % O2 Device: None (Room air) (Simultaneous filing. User may be unaware of other data.)    Weight: 237 lbs 0 oz    Constitutional: awake, alert, cooperative, no apparent distress, and appears stated age  Respiratory: No increased work of breathing, good air exchange, clear to auscultation bilaterally, no crackles or wheezing  Cardiovascular: regular rate and rhythm, normal S1 and S2, no S3, no S4, and no murmur noted  GI: normal bowel sounds, soft, non-distended, and non-tender  Musculoskeletal: 1+ pitting edema to BLEs, no other areas of edema. No deformities.  Neurologic: Moving all extremities equally and spontaneously    Medical Decision " Making       55 MINUTES SPENT BY ME on the date of service doing chart review, history, exam, documentation & further activities per the note.      Data     I have personally reviewed the following data over the past 24 hrs:    11.4 (H)  \   8.0 (L)   / 47 (LL)     143 105 44.8 (H) /  108 (H)   3.9 28 1.56 (H) \     ALT: 11 AST: 24 AP: 120 TBILI: 0.7   ALB: 2.8 (L) TOT PROTEIN: 4.5 (L) LIPASE: N/A       Imaging results reviewed over the past 24 hrs:   No results found for this or any previous visit (from the past 24 hours).    Recent Labs   Lab 11/03/24  0750 11/03/24  0428 11/03/24  0129 11/02/24  1755 11/02/24  1538 11/02/24  0816 11/02/24  0418 11/01/24  0828 11/01/24  0420   WBC  --  11.4*  --   --   --   --  7.6  --  6.7   HGB  --  8.0*  --   --   --   --  8.3*  --  8.5*   MCV  --  103*  --   --   --   --  103*  --  101*   PLT  --  47*  --   --   --   --  44*  --  42*   NA  --  143  --   --  141  --  142   < > 141   POTASSIUM  --  3.9  --   --  4.3  --  3.8   < > 3.7   CHLORIDE  --  105  --   --  105  --  105   < > 104   CO2  --  28  --   --  26  --  28   < > 29   BUN  --  44.8*  --   --  45.4*  --  50.0*   < > 52.8*   CR  --  1.56*  --   --  1.49*  --  1.63*   < > 1.69*   ANIONGAP  --  10  --   --  10  --  9   < > 8   SOLEDAD  --  7.7*  --   --  7.8*  --  8.1*   < > 8.2*   * 104* 112*   < > 144*   < > 129*   < > 111*   ALBUMIN  --  2.8*  --   --   --   --  2.9*  --  2.6*   PROTTOTAL  --  4.5*  --   --   --   --  4.7*  --  4.5*   BILITOTAL  --  0.7  --   --   --   --  0.7  --  0.8   ALKPHOS  --  120  --   --   --   --  142  --  120   ALT  --  11  --   --   --   --  11  --  10   AST  --  24  --   --   --   --  29  --  25    < > = values in this interval not displayed.

## 2024-11-03 NOTE — PROGRESS NOTES
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      # New Ascites complicated by SBP   # RENNY (baseline Cr 1)  Patient with new onset ascites; diagnostic fluid studies consistent with portal hypertensive ascites 2/2 cirrhosis and cell ct c.w. SBP. Ceftriaxone 2 g daily started 10/30/24, has received albumin and holding diuretics. Cr still elevated at 1.7, from baseline 1. Currently on diuretics for ADHF, however echo showed hyperkinetic - potentially from cirrhosis.     #. Decompensated Cirrhosis   Primary Hepatologist: Dr. Renteria   Etiology: HCV/MASLD   Ascites: + clinically.   - hold PTA 2mg Bumex and 50mg spironolactone  - SBP history: + hx (dx 10/30/24)   Hepatic encephalopathy: PTA lactulose and rifaximin  EV: EGD 5/2023 without varices   TIPS: n/a   PV: Patent on US 8/19/24   HCC: CT A/P 10/18/24 without concerning lesions       RECOMMENDATIONS:  - Recommend changing abx given zosyn given minimal improvement in PMN count  - if creatinine stable tomorrow can resume lower dose diuretics    Thank you for involving us in this patient's care. Please do not hesitate to contact the GI service with any questions or concerns.     Dr. Gala Stout MD  Transplant Hepatology    _______________________________________________________________      Subjective: Nursing notes and 24hr events reviewed.     Repeat Para with minimal improvement in SBP despite clinical improvement    Fluid  studies fluid low protein/high SAAG and neutrophillic    ROS:   4 pt ROS negative unless noted in subjective.     Objective:  Blood pressure 99/40, pulse 69, temperature 98.3  F (36.8  C), temperature source Oral, resp. rate 26, weight 107.5 kg (237 lb), SpO2 93%.    General: male sitting up in chair. Appears comfortable.  Answers appropriately.    Lungs: on RA, comfortable  Abdomen: distended  Extremities: pitting edema 1+, erythematous rash Rt anterior leg  Musculoskeletal: No gross deformity.  Skin: No jaundice or rash on exposed skin.  Mental status/Psych: A&O. Asks/answers questions appropriately. Pleasant, interactive.    Date 10/31/24 0700 - 11/01/24 0659   Shift 5039-8980 5315-1572 4767-5915 24 Hour Total   INTAKE   I.V. 36.13   36.13   Shift Total(mL/kg) 36.13(0.31)   36.13(0.31)   OUTPUT   Urine 725   725   Shift Total(mL/kg) 725(6.14)   725(6.14)   Weight (kg) 118.07 118.07 118.07 118.07         LABS:  [facility]  Recent Labs   Lab 11/03/24  0750 11/03/24  0428 11/03/24  0129 11/02/24  2208 11/02/24  1755 11/02/24  1538 11/02/24  0816 11/02/24  0418 11/01/24  2151 11/01/24  1658   NA  --  143  --   --   --  141  --  142  --  140   POTASSIUM  --  3.9  --   --   --  4.3  --  3.8  --  4.2   CHLORIDE  --  105  --   --   --  105  --  105  --  104   SOLEDAD  --  7.7*  --   --   --  7.8*  --  8.1*  --  8.3*   CO2  --  28  --   --   --  26  --  28  --  28   BUN  --  44.8*  --   --   --  45.4*  --  50.0*  --  52.5*   CR  --  1.56*  --   --   --  1.49*  --  1.63*  --  1.67*   * 104* 112* 144*   < > 144*   < > 129*   < > 151*    < > = values in this interval not displayed.     CBC  Recent Labs   Lab 11/03/24  0428 11/02/24  0418 11/01/24  0420 10/31/24  1602 10/31/24  0530   WBC 11.4* 7.6 6.7  --  2.9*   RBC 2.50* 2.57* 2.58*  --  2.10*   HGB 8.0* 8.3* 8.5*   < > 6.9*   HCT 25.7* 26.4* 26.1*  --  21.2*   * 103* 101*  --  101*   MCH 32.0 32.3 32.9  --  32.9   MCHC 31.1* 31.4* 32.6  --  32.5   RDW 22.1* 22.5* 23.2*   --  23.4*   PLT 47* 44* 42*  --  36*    < > = values in this interval not displayed.     INRNo lab results found in last 7 days.  LFTs  Recent Labs   Lab 11/03/24  0428 11/02/24  0418 11/01/24  0420 10/31/24  0530   ALKPHOS 120 142 120 113   AST 24 29 25 25   ALT 11 11 10 12   BILITOTAL 0.7 0.7 0.8 1.1   PROTTOTAL 4.5* 4.7* 4.5* 4.5*   ALBUMIN 2.8* 2.9* 2.6* 2.7*      PANCNo lab results found in last 7 days.

## 2024-11-03 NOTE — PLAN OF CARE
Goal Outcome Evaluation:      Plan of Care Reviewed With: patient    Overall Patient Progress: no change    Outcome Evaluation: discharge planning

## 2024-11-03 NOTE — PROGRESS NOTES
0700 - 1930    Neuro: A&O x4, forgetful at times  Cardiac: NSR  Resp: RA, SOB with movement/activity  GI/: LBM 11/2/24, urinates spontaneously in urinal  Diet: 2g NA   Activity: Assist of two with walker, ambulated in hallway x3  Pain: CO pain in left hip during movement. PRN oxy and robaxin given with good effect  Skin: BLE lymph wraps off during shift. No new skin deficits noted  LDA's: R port-a cath saline locked. CDI    Pt expressed goal of continuing to ambulate each day and felt positive about progress made towards this goal today. Pt had paracentesis procedure with 2.5 L of fluid taken off during shift.      Plan: Continue with plan of care, notify team of any changes

## 2024-11-03 NOTE — PLAN OF CARE
Goal Outcome Evaluation:           Overall Patient Progress: improvingOverall Patient Progress: improving    Outcome Evaluation: Patient walked the halls x2 with staff today.    Temp: 98.1  F (36.7  C) Temp src: Oral BP: 101/43 Pulse: 72   Resp: (!) 35 SpO2: 96 % O2 Device: None (Room air)       Heart failure    Neuro: A&O x4, forgetful at times.  Cardiac: Tele in place, NSR. Afebrile  Resp: VSS on RA  GI/: Patient urinates spontaneously in a urinal. May need diuretic to decrease edema. LBM: 11/2/24  Skin: No new deficits noted  LDAs: Right port-a-cath in place saline locked.   Activity: Up with assist of 1-2.      Plan: Continue to monitor and follow POC. Notify Gold Torsten with changes.

## 2024-11-04 ENCOUNTER — APPOINTMENT (OUTPATIENT)
Dept: OCCUPATIONAL THERAPY | Facility: CLINIC | Age: 74
End: 2024-11-04
Attending: CASE MANAGER/CARE COORDINATOR
Payer: COMMERCIAL

## 2024-11-04 ENCOUNTER — APPOINTMENT (OUTPATIENT)
Dept: PHYSICAL THERAPY | Facility: CLINIC | Age: 74
DRG: 291 | End: 2024-11-04
Attending: INTERNAL MEDICINE
Payer: COMMERCIAL

## 2024-11-04 LAB
ALBUMIN SERPL BCG-MCNC: 2.6 G/DL (ref 3.5–5.2)
ALP SERPL-CCNC: 123 U/L (ref 40–150)
ALT SERPL W P-5'-P-CCNC: 11 U/L (ref 0–70)
ANION GAP SERPL CALCULATED.3IONS-SCNC: 8 MMOL/L (ref 7–15)
AST SERPL W P-5'-P-CCNC: 25 U/L (ref 0–45)
BACTERIA BLD CULT: NO GROWTH
BACTERIA FLD CULT: NO GROWTH
BILIRUB DIRECT SERPL-MCNC: 0.23 MG/DL (ref 0–0.3)
BILIRUB SERPL-MCNC: 0.7 MG/DL
BUN SERPL-MCNC: 51.9 MG/DL (ref 8–23)
CALCIUM SERPL-MCNC: 7.7 MG/DL (ref 8.8–10.4)
CHLORIDE SERPL-SCNC: 104 MMOL/L (ref 98–107)
CREAT SERPL-MCNC: 1.72 MG/DL (ref 0.67–1.17)
EGFRCR SERPLBLD CKD-EPI 2021: 41 ML/MIN/1.73M2
ERYTHROCYTE [DISTWIDTH] IN BLOOD BY AUTOMATED COUNT: 21.6 % (ref 10–15)
GLUCOSE BLDC GLUCOMTR-MCNC: 103 MG/DL (ref 70–99)
GLUCOSE BLDC GLUCOMTR-MCNC: 139 MG/DL (ref 70–99)
GLUCOSE BLDC GLUCOMTR-MCNC: 145 MG/DL (ref 70–99)
GLUCOSE BLDC GLUCOMTR-MCNC: 178 MG/DL (ref 70–99)
GLUCOSE SERPL-MCNC: 107 MG/DL (ref 70–99)
GRAM STAIN RESULT: NORMAL
GRAM STAIN RESULT: NORMAL
HCO3 SERPL-SCNC: 28 MMOL/L (ref 22–29)
HCT VFR BLD AUTO: 26.4 % (ref 40–53)
HGB BLD-MCNC: 8.4 G/DL (ref 13.3–17.7)
MAGNESIUM SERPL-MCNC: 2.4 MG/DL (ref 1.7–2.3)
MCH RBC QN AUTO: 32.9 PG (ref 26.5–33)
MCHC RBC AUTO-ENTMCNC: 31.8 G/DL (ref 31.5–36.5)
MCV RBC AUTO: 104 FL (ref 78–100)
PLATELET # BLD AUTO: 47 10E3/UL (ref 150–450)
POTASSIUM SERPL-SCNC: 4.1 MMOL/L (ref 3.4–5.3)
PROT SERPL-MCNC: 4.3 G/DL (ref 6.4–8.3)
RBC # BLD AUTO: 2.55 10E6/UL (ref 4.4–5.9)
SODIUM SERPL-SCNC: 140 MMOL/L (ref 135–145)
WBC # BLD AUTO: 11.3 10E3/UL (ref 4–11)

## 2024-11-04 PROCEDURE — 97130 THER IVNTJ EA ADDL 15 MIN: CPT | Mod: GO

## 2024-11-04 PROCEDURE — 250N000013 HC RX MED GY IP 250 OP 250 PS 637: Performed by: STUDENT IN AN ORGANIZED HEALTH CARE EDUCATION/TRAINING PROGRAM

## 2024-11-04 PROCEDURE — 250N000013 HC RX MED GY IP 250 OP 250 PS 637: Performed by: PHYSICIAN ASSISTANT

## 2024-11-04 PROCEDURE — 97129 THER IVNTJ 1ST 15 MIN: CPT | Mod: GO

## 2024-11-04 PROCEDURE — 250N000013 HC RX MED GY IP 250 OP 250 PS 637: Performed by: NURSE PRACTITIONER

## 2024-11-04 PROCEDURE — 250N000013 HC RX MED GY IP 250 OP 250 PS 637: Performed by: INTERNAL MEDICINE

## 2024-11-04 PROCEDURE — 83735 ASSAY OF MAGNESIUM: CPT | Performed by: STUDENT IN AN ORGANIZED HEALTH CARE EDUCATION/TRAINING PROGRAM

## 2024-11-04 PROCEDURE — 99222 1ST HOSP IP/OBS MODERATE 55: CPT | Mod: GC | Performed by: PHYSICAL MEDICINE & REHABILITATION

## 2024-11-04 PROCEDURE — 85027 COMPLETE CBC AUTOMATED: CPT | Performed by: NURSE PRACTITIONER

## 2024-11-04 PROCEDURE — 80053 COMPREHEN METABOLIC PANEL: CPT | Performed by: STUDENT IN AN ORGANIZED HEALTH CARE EDUCATION/TRAINING PROGRAM

## 2024-11-04 PROCEDURE — 97530 THERAPEUTIC ACTIVITIES: CPT | Mod: GP

## 2024-11-04 PROCEDURE — 97530 THERAPEUTIC ACTIVITIES: CPT | Mod: GO

## 2024-11-04 PROCEDURE — 250N000012 HC RX MED GY IP 250 OP 636 PS 637: Performed by: NURSE PRACTITIONER

## 2024-11-04 PROCEDURE — 250N000011 HC RX IP 250 OP 636: Performed by: NURSE PRACTITIONER

## 2024-11-04 PROCEDURE — 250N000011 HC RX IP 250 OP 636: Performed by: STUDENT IN AN ORGANIZED HEALTH CARE EDUCATION/TRAINING PROGRAM

## 2024-11-04 PROCEDURE — 99232 SBSQ HOSP IP/OBS MODERATE 35: CPT | Mod: GC | Performed by: STUDENT IN AN ORGANIZED HEALTH CARE EDUCATION/TRAINING PROGRAM

## 2024-11-04 PROCEDURE — 82248 BILIRUBIN DIRECT: CPT | Performed by: NURSE PRACTITIONER

## 2024-11-04 PROCEDURE — 99233 SBSQ HOSP IP/OBS HIGH 50: CPT | Performed by: STUDENT IN AN ORGANIZED HEALTH CARE EDUCATION/TRAINING PROGRAM

## 2024-11-04 PROCEDURE — 120N000005 HC R&B MS OVERFLOW UMMC

## 2024-11-04 RX ORDER — FAMOTIDINE 10 MG
10 TABLET ORAL 2 TIMES DAILY PRN
Status: DISCONTINUED | OUTPATIENT
Start: 2024-11-04 | End: 2024-11-14

## 2024-11-04 RX ORDER — BUMETANIDE 1 MG/1
1 TABLET ORAL DAILY
Status: DISCONTINUED | OUTPATIENT
Start: 2024-11-04 | End: 2024-11-08

## 2024-11-04 RX ORDER — CALCIUM CARBONATE 500 MG/1
500 TABLET, CHEWABLE ORAL DAILY PRN
Status: DISCONTINUED | OUTPATIENT
Start: 2024-11-04 | End: 2024-11-16 | Stop reason: HOSPADM

## 2024-11-04 RX ADMIN — INSULIN ASPART 1 UNITS: 100 INJECTION, SOLUTION INTRAVENOUS; SUBCUTANEOUS at 12:09

## 2024-11-04 RX ADMIN — Medication 950 MG: at 07:38

## 2024-11-04 RX ADMIN — PIPERACILLIN SODIUM AND TAZOBACTAM SODIUM 3.38 G: 3; .375 INJECTION, SOLUTION INTRAVENOUS at 12:10

## 2024-11-04 RX ADMIN — ANTACID TABLETS 500 MG: 500 TABLET, CHEWABLE ORAL at 20:40

## 2024-11-04 RX ADMIN — PIPERACILLIN SODIUM AND TAZOBACTAM SODIUM 3.38 G: 3; .375 INJECTION, SOLUTION INTRAVENOUS at 17:02

## 2024-11-04 RX ADMIN — HEPARIN, PORCINE (PF) 10 UNIT/ML INTRAVENOUS SYRINGE 5 ML: at 06:46

## 2024-11-04 RX ADMIN — BUMETANIDE 1 MG: 1 TABLET ORAL at 12:44

## 2024-11-04 RX ADMIN — HEPARIN, PORCINE (PF) 10 UNIT/ML INTRAVENOUS SYRINGE 5 ML: at 04:45

## 2024-11-04 RX ADMIN — FAMOTIDINE 10 MG: 10 TABLET ORAL at 16:12

## 2024-11-04 RX ADMIN — PIPERACILLIN SODIUM AND TAZOBACTAM SODIUM 3.38 G: 3; .375 INJECTION, SOLUTION INTRAVENOUS at 06:20

## 2024-11-04 RX ADMIN — PANTOPRAZOLE SODIUM 40 MG: 40 TABLET, DELAYED RELEASE ORAL at 07:37

## 2024-11-04 RX ADMIN — ACETAMINOPHEN 650 MG: 325 TABLET, FILM COATED ORAL at 16:12

## 2024-11-04 RX ADMIN — OXYCODONE HYDROCHLORIDE 5 MG: 5 TABLET ORAL at 16:12

## 2024-11-04 RX ADMIN — METOPROLOL SUCCINATE 125 MG: 25 TABLET, EXTENDED RELEASE ORAL at 07:37

## 2024-11-04 RX ADMIN — PREDNISONE 5 MG: 5 TABLET ORAL at 07:37

## 2024-11-04 RX ADMIN — ASPIRIN 81 MG CHEWABLE TABLET 81 MG: 81 TABLET CHEWABLE at 07:37

## 2024-11-04 NOTE — CONSULTS
[facility]     PM&R CONSULT        Consulting Provider: Jordan Grimes   Reason for Consult: Assessment of rehabilitation   Location of Patient: 6515-01  Date of Encounter: 11/4/2024   Date of Admission: 10/29/2024        ASSESSMENT/PLAN:    Mr. Gucci Logan is a right handed 74 year old with a relevant PMH of HCM w/ EDGAR, w/ valsalva LVOTO gradient, HFpEF, HTN, Metastatic Prostate Cancer, Decompensated Cirrhosis 2/2 HCV c/b ascites, SBP, CKD Stage III, T2DM, class II obesity. He was a direct admit and was admitted on 10/29/24 for escalation of his outpatient diuretic regimen ISO HCM but hospital course c/b SBP with a rehabilitation diagnosis of weakness and deconditioning resulting in the functional impairments: pain, fatigue, decreased strength, imbalance, decreased tolerance to activity, functional impairments in mobility, functional impairments in gait and functional impairments in ADLs/iADLs.    Physical Medicine and Rehabilitation have been consulted to evaluate patient for rehabilitation needs/discharge planning.    Patient is currently below his baseline and needs further care as well as therapies to have a safe return to home. He is below his baseline although his level of mobility and function is more dependent of the stability of his medical conditions, as per patient, he needs help when he has severe worsening edema in his legs. Patient is now CGA for mobility, we recommend transition to a TCU to continue his recovery before going back home.     Recommendations:   #Disposition  - Level of rehabilitation: TCU  - Estimated length of stay:  2 weeks  - Rehab prognosis:  fair    # ADL & mobility deficits due to weakness and deconditioning  - Therapy needs: PT and OT    Thank you for this interesting consult.     Patient seen and discussed with attending physician Dr. Ikramuddin MD Paola Toussaint Gonzalez, MD  Physical Medicine and Rehabilitation  PGY-3        HPI:    Gucci Logan is a 74 year old male with a past medical history of HCM w/ EDGAR, w/ valsalva LVOTO gradient, HFpEF, HTN, Metastatic Prostate Cancer, Decompensated Cirrhosis 2/2 HCV c/b ascites, SBP, CKD Stage III, T2DM, class II obesity. He was a direct admit and was admitted on 10/29/24 for escalation of his outpatient diuretic regimen ISO HCM, unable to make frequent outpatient labs and appts d/t mobility issues, hence the admit for safety. Initially admitted to the Cardiology service, but hospital course c/b SBP after which he was transferred to the Medicine service on 10/31/24.     Cardiology is managing volume status regarding acute on chronic heart failure. Hospital course has been complicated by decompensated cirrhosis and spontaneous bacterial peritonitis, currently on antibiotic (Zosyn). He is also being treated for RENNY in the setting of CKD stage III and chronic conditions including metastatic prostate cancer and type 2 diabetes mellitus.     Patient was seen today at bedside. He reports he is now able to walk independently with his walker, which we were able to witness (patient walked with SBA from the nurse) using his FWW to the restroom. He continues to feel weak and below his baseline but can tell how his mobility is improving.     NURSING REPORT:  D AVSS with sat's 93% on room air.   Voiced c/o right lower leg pain which was relieved Oxycodone and tylenol   Had no BM overnight and voiding low/adequate amount.    CURRENT PRECAUTIONS/RESTRICTIONS:  Falls    DVT PPX:  PCDs for now     PREVIOUS LEVEL OF FUNCTION:  Self-Care  Usual Activity Tolerance: moderate  Current Activity Tolerance: fair  Equipment Currently Used at Home: wheelchair, manual, commode chair, cane, straight, tub bench, walker, standard  Fall history within last six months: yes  Number of times patient has fallen within last six months: 1  Activity/Exercise/Self-Care Comment: Pt reports most recently Gabrielle with  stand pivot transfers to manual wheelchair. Utilizes wheelchair mostly to get around home. Has not walked consistently with FWW since before fractures in 5/2024. Has hospital bed and trapeze to assist self with getting OOB, also utilizes leg  when needed.     CURRENT FUNCTION:   PT: Pt amb 2 x 20' with FWW CGA, overall very slow gait speed with short step length, limited by low activity tolerance and L hip pain from previous fx.    PT Discharge Recommendation (DC Rec): Transitional Care Facility, home with assist, home with home care physical therapy    OT: min A STS, close CGA w/ FWW ambulation short distance    OT Discharge Recommendation (DC Rec): home with home care occupational therapy  OT Rationale for DC Rec: pt needing inc A due to LB edema and heaviness of limbs. anticipate with volume management and IP therapies, pt may be able to d/c home safetly with continued HHOT/PT services. pt may benefit from overall inc level of assist at baseline.    SLP: Has not evaluated patient        LIVING SITUATION/SUPPORT:    People in Home: alone  Current Living Arrangements: condominium  Home Accessibility: wheelchair accessible  Living Environment Comments: [facility] with tub shower, shower bench, and w/ch accessibility. endorses some difficulty with width of doors but has narrow w/ch for ease of access.    Patient receiving home care services: Yes  Skilled Home Care Services: [facility]    Support system includes  (Friend, Mac)           Work status: retired    PAST MEDICAL HISTORY:  Past Medical History:   Diagnosis Date    Arthritis     Calcium pyrophosphate deposition disease 08/08/2024    Chronic hepatitis C (H) 05/19/2008    Chronic, continuous use of opioids     Cirrhosis of liver (H) 06/18/2008    Class 2 severe obesity due to excess calories with serious comorbidity in adult (H) 06/04/2024    Compression fracture of T5 vertebra with routine healing, subsequent encounter 02/28/2023     "Compression fracture of T6 vertebra with routine healing 02/20/2023    Diabetes 1.5, 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    Diverticular disease of colon 07/14/2015    Esophageal reflux 03/01/2014    Gastroesophageal reflux disease, esophagitis presence not specified 10/06/2016    IMO Regulatory Load OCT 2020      Glaucoma suspect of both eyes 04/30/2024    Hearing problem     Hiatal hernia     Hyperlipidemia LDL goal <100 04/19/2017    Hypertension     Hypertension goal BP (blood pressure) < 140/90 02/08/2013    Intertrochanteric fracture of left femur, closed, initial encounter (H) 05/19/2024    Jaundice 05/31/2008    Liver disease     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 Not keen to use it.     Stage 3 chronic kidney disease, unspecified whether stage 3a or 3b CKD (H) 01/23/2024    Syncope, unspecified syncope type 02/20/2023    Thrombocytopenia (H) 08/28/2008    Overview:   8/28/2008, attributed to liver disease with portal hypertension and functional splenomegaly         CURRENT MEDICATIONS:  Reviewed    EXAMINATION:  Vital signs:  Temp: 98.2  F (36.8  C) Temp src: Oral BP: 104/51 Pulse: 69   Resp: 22 SpO2: 94 % O2 Device: None (Room air)     Weight: 107.2 kg (236 lb 5.3 oz)  Estimated body mass index is 33.91 kg/m  as calculated from the following:    Height as of 10/24/24: 1.778 m (5' 10\").    Weight as of this encounter: 107.2 kg (236 lb 5.3 oz).    General: NAD, pleasant and cooperative   HEENT: ATNC, no discharge from nares or ears "    Pulmonary: breathing comfortably on room air, no accessory muscle use   Cardiovascular: extremities WWP  Extremities: Warm and well perfused in bilateral upper and lower extremities. Mild edema in bilateral lower extremities  MSK/neuro:   Mental Status:  alert and oriented x3. Follows 1-2 step commands.     Cranial Nerves:   Grossly normal   Sensory: Normal to light touch in bilateral upper and lower extremities    Strength: moving anti-gravity 4 extremities    Abnormal movements: None    Speech: fluent, appropriate   Cognition: answers questions appropriately   Gait: able to walk with FWW and SBA to the restroom    LABS  BMP  Recent Labs   Lab 11/04/24  1202 11/04/24  0745 11/04/24  0438 11/03/24  2227 11/03/24  0750 11/03/24  0428 11/02/24  1755 11/02/24  1538 11/02/24  0816 11/02/24 0418   NA  --   --  140  --   --  143  --  141  --  142   POTASSIUM  --   --  4.1  --   --  3.9  --  4.3  --  3.8   CHLORIDE  --   --  104  --   --  105  --  105  --  105   SOLEDAD  --   --  7.7*  --   --  7.7*  --  7.8*  --  8.1*   CO2  --   --  28  --   --  28  --  26  --  28   BUN  --   --  51.9*  --   --  44.8*  --  45.4*  --  50.0*   CR  --   --  1.72*  --   --  1.56*  --  1.49*  --  1.63*   * 103* 107* 140*   < > 104*   < > 144*   < > 129*    < > = values in this interval not displayed.     CBC  Recent Labs   Lab 11/04/24  0438 11/03/24  0428 11/02/24 0418 11/01/24  0420   WBC 11.3* 11.4* 7.6 6.7   RBC 2.55* 2.50* 2.57* 2.58*   HGB 8.4* 8.0* 8.3* 8.5*   HCT 26.4* 25.7* 26.4* 26.1*   * 103* 103* 101*   MCH 32.9 32.0 32.3 32.9   MCHC 31.8 31.1* 31.4* 32.6   RDW 21.6* 22.1* 22.5* 23.2*   PLT 47* 47* 44* 42*     INR No lab results found in last 7 days.    IMAGING  No results found for this or any previous visit (from the past 24 hours).

## 2024-11-04 NOTE — PLAN OF CARE
Changes during this shift: Ambulated in flores with RN    Running: None   PRN Med: None     Vitals:  BP 91/42 (BP Location: Right arm, Cuff Size: Adult Regular)   Pulse 68   Temp 98.4  F (36.9  C) (Oral)   Resp 20   Wt 107.5 kg (237 lb)   SpO2 97%   BMI 34.01 kg/m         Neuro: Ax4 Denies Headache, dizziness with position changes,  Denies Lightheadedness, numbness and tingling. calls appropriately   Cardiac: SR  Afebrile, VSS.  Denies chest pain.   Respiratory: sating >95 ON RA. denies SOB. LS clear bilaterally.   Diet/appetite: regular Diet. Good appetite.   Endocrine: BS check ACHS Pt on sliding scale  insulin   GI/:  No BM this shift. Denies abdominal pain. Adequate urine output.   Activity:  Moves A x 1 GB walker  Pain: c/o 6/10 pain in knee, back, stomach intermittently, offered pain meds, refused.   Skin: WNL, Warm, dry, normal color  Plan: Continue POC, Notify [facility] with any changes in patient condition.         Heart Failure Care Map  GOALS TO BE MET BEFORE DISCHARGE:    1. Decrease congestion and/or edema with diuretic therapy to achieve near optimal volume status.     Dyspnea improved: Yes, satisfactory for discharge.   Edema improved: No, further care required to meet this goal. Please explain Lymph wraps utilized        Last 24 hour I/O:   Intake/Output Summary (Last 24 hours) at 11/3/2024 1832  Last data filed at 11/3/2024 1700  Gross per 24 hour   Intake 1060 ml   Output 800 ml   Net 260 ml           Net I/O and Weights since admission:   10/04 2300 - 11/03 2259  In: 9220.44 [P.O.:7150; I.V.:720.86]  Out: 68335 [Urine:59102]  Net: -2669.56     Vitals:    10/29/24 1700 10/31/24 0600 11/01/24 0500 11/02/24 0400   Weight: 121.3 kg (267 lb 6.4 oz) 118.1 kg (260 lb 4.8 oz) 109.5 kg (241 lb 6.4 oz) 107.7 kg (237 lb 6.4 oz)    11/03/24 0528   Weight: 107.5 kg (237 lb)       2.  O2 sats > 90% on room air, or at prior home O2 therapy level.      Able to wean O2 this shift to keep sats above 90%?: Yes,  satisfactory for discharge.   Does patient use Home O2? No          Current oxygenation status:   SpO2: 97 %     O2 Device: None (Room air),      3.  Tolerates ambulation and mobility near baseline.     Ambulation: Yes, satisfactory for discharge.   Times patient ambulated with staff this shift: 3    Please review the Heart Failure Care Map for additional HF goal outcomes.    Louise Carias RN  11/3/2024

## 2024-11-04 NOTE — PROGRESS NOTES
Care Management Follow Up    Length of Stay (days): 6    Expected Discharge Date: 11/06/2024     Concerns to be Addressed:   discharge planning    Patient plan of care discussed at interdisciplinary rounds: Yes    Anticipated Discharge Disposition:  TCU vs. Home with assist/home care physical therapy     SW received an update from PT on how session went today (pt making illogical statements, etc). Per PT, pt still meets TCU criteria but could progress out of need. Please see PT flowsheets for details on today's session.    Anticipated Discharge Services:  TCU therapy services  Anticipated Discharge DME:  n/a    Patient/family educated on Medicare website which has current facility and service quality ratings:  yes  Education Provided on the Discharge Plan:  yes  Patient/Family in Agreement with the Plan:  yes    Referrals Placed by CM/SW:      PENDING  [facility]  [address] [place]  [address]  Admissions: [phone]  Fax: [phone]  - 11/4: SW called admissions at 12:18pm, spoke to Sharri, and per Sharri, she is awaiting an update from the NOÉ (NOÉ is reviewing referral) and will call the SW back once she has an update.    SW tasked 6th floor Community Health Worker Anca to follow up on TCU choices and send referrals. Please see CHW note 11/4 for details.            Private pay costs discussed: Not applicable    Discussed  Partnership in Safe Discharge Planning  document with patient/family: No     Handoff Completed: No, handoff not indicated or clinically appropriate    Additional Information:  Per the [facility] provider (Cornelio), pt is hoepfully med ready in 2-3 days pending stable kidney function on diuretics. He also told the SW he placed a PM&R consult today to assess if pt can possibly go to an ARU.     Next Steps: SW to follow up on TCU referrals.    SW will continue to follow and assist as needed.     ___________________    IVANIA Lloyd, LGSW  6C ,  covering beds 4911 to 6568  [facility]   Phone: [phone]  6C Cards MITUL [place]

## 2024-11-04 NOTE — PLAN OF CARE
D AVSS with sat's 93% on room air. Heart regular/sinus and lungs decreased in bases otherwise clear. Voiced c/o right lower leg pain which was relieved Oxycodone and tylenol x 1 during the last 12 hours with no c/o nausea. Had no BM overnight and voiding low/adequate amount.  Bed weight approx the same as yesterday. Remains a/o x 4 and cooperative with cares. Encouraged with turning/repositioning but occasionally refuses. Meplex intact on coccyx with mild redness and dry scab underneath dressing. Needed no electrolyte replacements this AM and abx's running q6h via port.   I Vital's, assessment and med's per order.  A Resting in bed with call light in reach.  P Continue to monitor and update MD with changes.

## 2024-11-04 NOTE — PROGRESS NOTES
"Care Management Follow Up    Additional information    11/4/24 - CHW delegated by MITUL, has provided pt with a list of TCU facilities to pick from. Pt inquired further, I explained that he has 1 referral in right now and we'd like to get more sent so he has a better chance of acceptance.     Pt asked whether the facilities would take him with all of his complex needs and if insurance will cover it. I further explained that this is the purpose of the referrals; we are sending information to them with all of his needs, then they decide whether they can accept him or not. He then asked if we have to \"start all over\" if the facilities decline, I said it's not a matter of starting anything over, we would simply send referrals to more places. Pt expressed understanding.    As for insurance, I showed him that the printed list was from Medicare.gov and stated while I cannot speak to how much exactly will be covered, these are in-network. Pt expressed understanding.    Providers entered the room, I gave pt my direct line and advised him to take the time he needs to pick some more out, and left.     Addendum 1458: Checked back with patient to see if he has picked any facilities. We had a lengthy discussion about his health and experiences. He requested I send referrals to [facility] as this is his highest preference, and [facility]. Other than those, he requested I put in referrals to any that are nearby. See below.      Pending:    [facility]   [address] [address]  Ph: [phone]  Fax: [phone]    [facility]  TCU  [address] [address]  Ph: [phone]  Fax: [phone]    [facility]  [address] [place] [address]  Ph: [phone]  Fax: [phone]    [facility]  [address] [place] [address]  Ph: [phone]  Fax: [phone]      Declined:    [facility]  [address] [facility] [address]  Ph: " [phone]  Fax: [phone]  11/4: Acuity too high, cost of cancer treatment    Anca Linder  [facility] Worker  6A, 6B, 6C   [phone]  Fax [phone]

## 2024-11-04 NOTE — PLAN OF CARE
"  Problem: Adult Inpatient Plan of Care  Goal: Plan of Care Review  Description: The Plan of Care Review/Shift note should be completed every shift.  The Outcome Evaluation is a brief statement about your assessment that the patient is improving, declining, or no change.  This information will be displayed automatically on your shift  note.  Outcome: Progressing  Flowsheets (Taken 11/4/2024 1716)  Outcome Evaluation: amb in [place]ores, up in recliner, zosyn continues, oxy prn pain  Plan of Care Reviewed With: patient  Overall Patient Progress: improving  Goal: Patient-Specific Goal (Individualized)  Description: You can add care plan individualizations to a care plan. Examples of Individualization might be:  \"Parent requests to be called daily at 9am for status\", \"I have a hard time hearing out of my right ear\", or \"Do not touch me to wake me up as it startles  me\".  Outcome: Progressing  Goal: Absence of Hospital-Acquired Illness or Injury  Outcome: Progressing  Intervention: Identify and Manage Fall Risk  Recent Flowsheet Documentation  Taken 11/4/2024 1600 by Brett Ford, RN  Safety Promotion/Fall Prevention:   activity supervised   assistive device/personal items within reach   clutter free environment maintained   room near nurse's station   room organization consistent   safety round/check completed   lighting adjusted  Taken 11/4/2024 1200 by Brett Ford, RN  Safety Promotion/Fall Prevention:   activity supervised   assistive device/personal items within reach   clutter free environment maintained   room near nurse's station   room organization consistent   safety round/check completed   lighting adjusted  Taken 11/4/2024 0800 by Brett Ford, RN  Safety Promotion/Fall Prevention:   activity supervised   assistive device/personal items within reach   clutter free environment maintained   room near nurse's station   room organization consistent   safety round/check completed   lighting " adjusted  Intervention: Prevent Skin Injury  Recent Flowsheet Documentation  Taken 11/4/2024 1600 by Brett Ford RN  Body Position:   position changed independently   weight shifting  Skin Protection:   adhesive use limited   silicone foam dressing in place  Taken 11/4/2024 1200 by Brett Ford RN  Body Position:   position changed independently   weight shifting  Skin Protection:   adhesive use limited   silicone foam dressing in place  Taken 11/4/2024 0818 by Brett Ford RN  Body Position:   position changed independently   weight shifting  Taken 11/4/2024 0800 by Brett Ford RN  Body Position:   position changed independently   weight shifting  Skin Protection:   adhesive use limited   silicone foam dressing in place  Intervention: Prevent and Manage VTE (Venous Thromboembolism) Risk  Recent Flowsheet Documentation  Taken 11/4/2024 1600 by Brett Ford RN  VTE Prevention/Management:   compression stockings off   SCDs off (sequential compression devices)  Taken 11/4/2024 1200 by Brett Ford RN  VTE Prevention/Management:   compression stockings off   SCDs off (sequential compression devices)  Taken 11/4/2024 0800 by Brett Ford RN  VTE Prevention/Management:   compression stockings off   SCDs off (sequential compression devices)  Intervention: Prevent Infection  Recent Flowsheet Documentation  Taken 11/4/2024 1600 by Brett Ford RN  Infection Prevention:   environmental surveillance performed   equipment surfaces disinfected   hand hygiene promoted   personal protective equipment utilized   single patient room provided  Taken 11/4/2024 1200 by Brett Ford RN  Infection Prevention:   environmental surveillance performed   equipment surfaces disinfected   hand hygiene promoted   personal protective equipment utilized   single patient room provided  Taken 11/4/2024 0800 by Brett Ford RN  Infection Prevention:   environmental  surveillance performed   equipment surfaces disinfected   hand hygiene promoted   personal protective equipment utilized   single patient room provided  Goal: Optimal Comfort and Wellbeing  Outcome: Progressing  Intervention: Provide Person-Centered Care  Recent Flowsheet Documentation  Taken 11/4/2024 1600 by Brett Ford RN  Trust Relationship/Rapport:   care explained   choices provided   questions encouraged  Taken 11/4/2024 1200 by Brett Ford RN  Trust Relationship/Rapport:   care explained   choices provided   questions encouraged  Taken 11/4/2024 0800 by Brett Ford RN  Trust Relationship/Rapport:   care explained   choices provided   questions encouraged  Goal: Readiness for Transition of Care  Outcome: Progressing     Problem: Heart Failure  Goal: Optimal Coping  Outcome: Progressing  Intervention: Support Psychosocial Response  Recent Flowsheet Documentation  Taken 11/4/2024 1600 by Brett Ford RN  Supportive Measures:   active listening utilized   goal-setting facilitated   positive reinforcement provided  Family/Support System Care: self-care encouraged  Taken 11/4/2024 1200 by Brett Ford RN  Supportive Measures:   active listening utilized   goal-setting facilitated   positive reinforcement provided  Family/Support System Care: self-care encouraged  Taken 11/4/2024 0800 by Brett Ford RN  Supportive Measures:   active listening utilized   goal-setting facilitated   positive reinforcement provided  Family/Support System Care: self-care encouraged  Goal: Optimal Cardiac Output  Outcome: Progressing  Intervention: Optimize Cardiac Output  Recent Flowsheet Documentation  Taken 11/4/2024 1600 by Brett Ford RN  Environmental Support:   calm environment promoted   environmental consistency promoted   personal routine supported  Taken 11/4/2024 1200 by Brett Ford RN  Environmental Support:   calm environment promoted   environmental  consistency promoted   personal routine supported  Taken 11/4/2024 0800 by Brett Ford RN  Environmental Support:   calm environment promoted   environmental consistency promoted   personal routine supported  Goal: Stable Heart Rate and Rhythm  Outcome: Progressing  Goal: Fluid and Electrolyte Balance  Outcome: Progressing  Goal: Optimal Functional Ability  Outcome: Progressing  Intervention: Optimize Functional Ability  Recent Flowsheet Documentation  Taken 11/4/2024 1600 by Brett Ford RN  Activity Management: back to bed  Taken 11/4/2024 1200 by Brett Ford RN  Activity Management: up in chair  Taken 11/4/2024 0818 by Brett Ford RN  Activity Management: activity encouraged  Taken 11/4/2024 0800 by Brett Ford RN  Activity Management: activity encouraged  Goal: Improved Oral Intake  Outcome: Progressing  Goal: Effective Oxygenation and Ventilation  Outcome: Progressing  Intervention: Promote Airway Secretion Clearance  Recent Flowsheet Documentation  Taken 11/4/2024 1600 by Brett Ford RN  Cough And Deep Breathing: done independently per patient  Activity Management: back to bed  Taken 11/4/2024 1200 by Brett Ford RN  Cough And Deep Breathing: done independently per patient  Activity Management: up in chair  Taken 11/4/2024 0818 by Brett Ford RN  Activity Management: activity encouraged  Taken 11/4/2024 0800 by Brett Ford RN  Cough And Deep Breathing: done independently per patient  Activity Management: activity encouraged  Intervention: Optimize Oxygenation and Ventilation  Recent Flowsheet Documentation  Taken 11/4/2024 1600 by Brett Ford RN  Head of Bed (HOB) Positioning: HOB at 30-45 degrees  Taken 11/4/2024 0800 by Brett Ford RN  Head of Bed (HOB) Positioning: HOB at 30-45 degrees  Goal: Effective Breathing Pattern During Sleep  Outcome: Progressing  Intervention: Monitor and Manage Obstructive Sleep  Apnea  Recent Flowsheet Documentation  Taken 11/4/2024 1600 by Brett Ford RN  Medication Review/Management: medications reviewed  Taken 11/4/2024 1200 by Brett Ford RN  Medication Review/Management: medications reviewed  Taken 11/4/2024 0800 by Brett Ford RN  Medication Review/Management: medications reviewed     Problem: Fall Injury Risk  Goal: Absence of Fall and Fall-Related Injury  Outcome: Progressing  Intervention: Identify and Manage Contributors  Recent Flowsheet Documentation  Taken 11/4/2024 1600 by Brett Ford RN  Medication Review/Management: medications reviewed  Taken 11/4/2024 1200 by Brett Ford RN  Medication Review/Management: medications reviewed  Taken 11/4/2024 0800 by Brett Ford RN  Medication Review/Management: medications reviewed  Intervention: Promote Injury-Free Environment  Recent Flowsheet Documentation  Taken 11/4/2024 1600 by Brett Ford RN  Safety Promotion/Fall Prevention:   activity supervised   assistive device/personal items within reach   clutter free environment maintained   room near nurse's station   room organization consistent   safety round/check completed   lighting adjusted  Taken 11/4/2024 1200 by Brett Ford RN  Safety Promotion/Fall Prevention:   activity supervised   assistive device/personal items within reach   clutter free environment maintained   room near nurse's station   room organization consistent   safety round/check completed   lighting adjusted  Taken 11/4/2024 0800 by Brett Ford RN  Safety Promotion/Fall Prevention:   activity supervised   assistive device/personal items within reach   clutter free environment maintained   room near nurse's station   room organization consistent   safety round/check completed   lighting adjusted     Problem: Pain Acute  Goal: Optimal Pain Control and Function  Outcome: Progressing  Intervention: Optimize Psychosocial Wellbeing  Recent  Flowsheet Documentation  Taken 11/4/2024 1600 by Brett Ford RN  Supportive Measures:   active listening utilized   goal-setting facilitated   positive reinforcement provided  Diversional Activities: television  Taken 11/4/2024 1200 by Brett Ford RN  Supportive Measures:   active listening utilized   goal-setting facilitated   positive reinforcement provided  Diversional Activities: television  Taken 11/4/2024 0800 by Brett Ford RN  Supportive Measures:   active listening utilized   goal-setting facilitated   positive reinforcement provided  Diversional Activities: television  Intervention: Prevent or Manage Pain  Recent Flowsheet Documentation  Taken 11/4/2024 1600 by Brett Ford RN  Sensory Stimulation Regulation:   television on   quiet environment promoted   lighting decreased  Bowel Elimination Promotion:   commode/bedpan at bedside   ambulation promoted  Medication Review/Management: medications reviewed  Taken 11/4/2024 1200 by Brett Ford RN  Sensory Stimulation Regulation:   television on   quiet environment promoted   lighting decreased  Bowel Elimination Promotion:   commode/bedpan at bedside   ambulation promoted  Medication Review/Management: medications reviewed  Taken 11/4/2024 0800 by Brett Ford RN  Sensory Stimulation Regulation:   television on   quiet environment promoted   lighting decreased  Bowel Elimination Promotion:   commode/bedpan at bedside   ambulation promoted  Medication Review/Management: medications reviewed     Problem: Skin Injury Risk Increased  Goal: Skin Health and Integrity  Outcome: Progressing  Intervention: Optimize Skin Protection  Recent Flowsheet Documentation  Taken 11/4/2024 1600 by Brett Ford RN  Pressure Reduction Techniques:   frequent weight shift encouraged   weight shift assistance provided  Skin Protection:   adhesive use limited   silicone foam dressing in place  Activity Management: back to  bed  Head of Bed (HOB) Positioning: HOB at 30-45 degrees  Taken 11/4/2024 1200 by Brett Ford, RN  Pressure Reduction Techniques:   frequent weight shift encouraged   weight shift assistance provided  Skin Protection:   adhesive use limited   silicone foam dressing in place  Activity Management: up in chair  Taken 11/4/2024 0818 by Brett Ford, RN  Activity Management: activity encouraged  Taken 11/4/2024 0800 by Brett Ford, RN  Pressure Reduction Techniques:   frequent weight shift encouraged   weight shift assistance provided  Skin Protection:   adhesive use limited   silicone foam dressing in place  Activity Management: activity encouraged  Head of Bed (HOB) Positioning: HOB at 30-45 degrees     Problem: Activity Intolerance  Goal: Enhanced Capacity and Energy  Outcome: Progressing  Intervention: Optimize Activity Tolerance  Recent Flowsheet Documentation  Taken 11/4/2024 1600 by Brett Ford RN  Activity Management: back to bed  Environmental Support:   calm environment promoted   environmental consistency promoted   personal routine supported  Taken 11/4/2024 1200 by Brett Ford RN  Activity Management: up in chair  Environmental Support:   calm environment promoted   environmental consistency promoted   personal routine supported  Taken 11/4/2024 0818 by Brett Ford RN  Activity Management: activity encouraged  Taken 11/4/2024 0800 by Brett Ford RN  Activity Management: activity encouraged  Environmental Support:   calm environment promoted   environmental consistency promoted   personal routine supported   Goal Outcome Evaluation:      Plan of Care Reviewed With: patient    Overall Patient Progress: improvingOverall Patient Progress: improving    Outcome Evaluation: amb in [place], up in recliner, zosyn continues, oxy prn pain

## 2024-11-04 NOTE — PROGRESS NOTES
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      # New Ascites complicated by SBP   # RENNY (baseline Cr 1)  Patient with new onset ascites; diagnostic fluid studies consistent with portal hypertensive ascites 2/2 cirrhosis and cell ct c.w. SBP. Ceftriaxone 2 g daily started 10/30/24, has received albumin and holding diuretics. Cr still elevated at 1.7, from baseline 1. Diuretics on hold.    #. Decompensated Cirrhosis   Primary Hepatologist: Dr. Renteria   Etiology: HCV/MASLD   Ascites: + clinically.   - hold PTA 2mg Bumex and 50mg spironolactone  - SBP history: + hx (dx 10/30/24) , repeat para also positive 11/2, switched abx to Zosyn  Hepatic encephalopathy: PTA lactulose and rifaximin  EV: EGD 5/2023 without varices   TIPS: n/a   PV: Patent on US 8/19/24   HCC: CT A/P 10/18/24 without concerning lesions     MELD 3.0: 11 at 8/21/2024  6:34 AM  MELD-Na: 10 at 8/21/2024  6:34 AM  Calculated from:  Serum Creatinine: 1.04 mg/dL at 8/21/2024  6:34 AM  Serum Sodium: 140 mmol/L (Using max of 137 mmol/L) at 8/21/2024  6:34 AM  Total Bilirubin: 1.1 mg/dL at 8/20/2024  6:06 AM  Serum Albumin: 2.7 g/dL at 8/20/2024  6:06 AM  INR(ratio): 1.32 at 8/19/2024  5:14 PM  Age at listing (hypothetical): 74 years  Sex: Male at 8/21/2024  6:34 AM      RECOMMENDATIONS:  - Cont Zosyn   - Hold diuretics for now   - Will plan to repeat para in 2-3 days    Thank you for involving us in this patient's  care. Please do not hesitate to contact the GI service with any questions or concerns.     Patient was d/w the attending, Dr. Girish Severino  GI Fellow    _______________________________________________________________      Subjective: Nursing notes and 24hr events reviewed.     Sitting up in chair; not complaints; is eating well and walking the halls    ROS:   4 pt ROS negative unless noted in subjective.     Objective:  Blood pressure 104/51, pulse 69, temperature 98.2  F (36.8  C), temperature source Oral, resp. rate 22, weight 107.2 kg (236 lb 5.3 oz), SpO2 98%.    General: male sitting up in chair. Appears comfortable.  Answers appropriately.    Lungs: on RA, comfortable  Abdomen: distended  Extremities: pitting edema 1+, erythematous rash Rt anterior leg  Musculoskeletal: No gross deformity.  Skin: No jaundice or rash on exposed skin.  Mental status/Psych: A&O. Asks/answers questions appropriately. Pleasant, interactive.    Date 10/31/24 0700 - 11/01/24 0659   Shift 9110-5580 7495-3232 6358-5967 24 Hour Total   INTAKE   I.V. 36.13   36.13   Shift Total(mL/kg) 36.13(0.31)   36.13(0.31)   OUTPUT   Urine 725   725   Shift Total(mL/kg) 725(6.14)   725(6.14)   Weight (kg) 118.07 118.07 118.07 118.07         LABS:  [facility]  Recent Labs   Lab 11/04/24  0745 11/04/24  0438 11/03/24  2227 11/03/24  1813 11/03/24  0750 11/03/24  0428 11/02/24  1755 11/02/24  1538 11/02/24  0816 11/02/24  0418   NA  --  140  --   --   --  143  --  141  --  142   POTASSIUM  --  4.1  --   --   --  3.9  --  4.3  --  3.8   CHLORIDE  --  104  --   --   --  105  --  105  --  105   SOLEDAD  --  7.7*  --   --   --  7.7*  --  7.8*  --  8.1*   CO2  --  28  --   --   --  28  --  26  --  28   BUN  --  51.9*  --   --   --  44.8*  --  45.4*  --  50.0*   CR  --  1.72*  --   --   --  1.56*  --  1.49*  --  1.63*   * 107* 140* 156*   < > 104*   < > 144*   < > 129*    < > = values in this interval not displayed.     CBC  Recent Labs   Lab  11/04/24 0438 11/03/24 0428 11/02/24 0418 11/01/24 0420   WBC 11.3* 11.4* 7.6 6.7   RBC 2.55* 2.50* 2.57* 2.58*   HGB 8.4* 8.0* 8.3* 8.5*   HCT 26.4* 25.7* 26.4* 26.1*   * 103* 103* 101*   MCH 32.9 32.0 32.3 32.9   MCHC 31.8 31.1* 31.4* 32.6   RDW 21.6* 22.1* 22.5* 23.2*   PLT 47* 47* 44* 42*     INRNo lab results found in last 7 days.  LFTs  Recent Labs   Lab 11/04/24 0438 11/03/24 0428 11/02/24 0418 11/01/24 0420   ALKPHOS 123 120 142 120   AST 25 24 29 25   ALT 11 11 11 10   BILITOTAL 0.7 0.7 0.7 0.8   PROTTOTAL 4.3* 4.5* 4.7* 4.5*   ALBUMIN 2.6* 2.8* 2.9* 2.6*      PANCNo lab results found in last 7 days.

## 2024-11-04 NOTE — PROGRESS NOTES
"Full                       Bednarski, \"Canelo\"              Gold 7  See allergies       [birth year]   74yr old    Dx:  HF    DX:HCM w/ EDGAR, w/ valsalva LVOTO gradient, HFpEF, HTN, Metastatic Prostate Cancer, Decompensated Cirrhosis 2/2 HCV c/b ascites, SBP, CKD Stage III, T2DM, class II obesity     Neuro: A&O x4, sometimes forgetful              Up with assist x1 walker              Oxy/tylenol              Amb in flores/up to recliner  Male nurses preferred (asking female staff to be his personal nurse and he will    take care of her)    Cardiac: SR peaked T wave                  Lymphedema wraps    Resp: RA    GI/: Frequent urine output in urinal, last BM on 10/28              2g NA restriction, 2L fluid restriction              Pericentesis as needed      Skin: 3 quarter sized pressure wounds on coccyx, meplix    LDA's: R port-a cath hep locked, L PIV SL (zosyn)    "

## 2024-11-05 ENCOUNTER — APPOINTMENT (OUTPATIENT)
Dept: PHYSICAL THERAPY | Facility: CLINIC | Age: 74
End: 2024-11-05
Attending: INTERNAL MEDICINE
Payer: COMMERCIAL

## 2024-11-05 ENCOUNTER — APPOINTMENT (OUTPATIENT)
Dept: OCCUPATIONAL THERAPY | Facility: CLINIC | Age: 74
End: 2024-11-05
Attending: INTERNAL MEDICINE
Payer: COMMERCIAL

## 2024-11-05 LAB
ALBUMIN SERPL BCG-MCNC: 2.7 G/DL (ref 3.5–5.2)
ALBUMIN UR-MCNC: NEGATIVE MG/DL
ALP SERPL-CCNC: 125 U/L (ref 40–150)
ALT SERPL W P-5'-P-CCNC: 12 U/L (ref 0–70)
ANION GAP SERPL CALCULATED.3IONS-SCNC: 10 MMOL/L (ref 7–15)
APPEARANCE UR: CLEAR
AST SERPL W P-5'-P-CCNC: 26 U/L (ref 0–45)
BILIRUB DIRECT SERPL-MCNC: 0.23 MG/DL (ref 0–0.3)
BILIRUB SERPL-MCNC: 0.8 MG/DL
BILIRUB UR QL STRIP: NEGATIVE
BUN SERPL-MCNC: 51.2 MG/DL (ref 8–23)
CALCIUM SERPL-MCNC: 7.6 MG/DL (ref 8.8–10.4)
CHLORIDE SERPL-SCNC: 104 MMOL/L (ref 98–107)
COLOR UR AUTO: ABNORMAL
CREAT SERPL-MCNC: 1.85 MG/DL (ref 0.67–1.17)
CREAT UR-MCNC: 92.6 MG/DL
EGFRCR SERPLBLD CKD-EPI 2021: 38 ML/MIN/1.73M2
ERYTHROCYTE [DISTWIDTH] IN BLOOD BY AUTOMATED COUNT: 21.5 % (ref 10–15)
GLUCOSE BLDC GLUCOMTR-MCNC: 112 MG/DL (ref 70–99)
GLUCOSE BLDC GLUCOMTR-MCNC: 133 MG/DL (ref 70–99)
GLUCOSE BLDC GLUCOMTR-MCNC: 134 MG/DL (ref 70–99)
GLUCOSE BLDC GLUCOMTR-MCNC: 169 MG/DL (ref 70–99)
GLUCOSE BLDC GLUCOMTR-MCNC: 192 MG/DL (ref 70–99)
GLUCOSE SERPL-MCNC: 103 MG/DL (ref 70–99)
GLUCOSE UR STRIP-MCNC: NEGATIVE MG/DL
HCO3 SERPL-SCNC: 28 MMOL/L (ref 22–29)
HCT VFR BLD AUTO: 27.6 % (ref 40–53)
HGB BLD-MCNC: 8.7 G/DL (ref 13.3–17.7)
HGB UR QL STRIP: ABNORMAL
KETONES UR STRIP-MCNC: NEGATIVE MG/DL
LEUKOCYTE ESTERASE UR QL STRIP: NEGATIVE
MAGNESIUM SERPL-MCNC: 2.3 MG/DL (ref 1.7–2.3)
MCH RBC QN AUTO: 32.3 PG (ref 26.5–33)
MCHC RBC AUTO-ENTMCNC: 31.5 G/DL (ref 31.5–36.5)
MCV RBC AUTO: 103 FL (ref 78–100)
NITRATE UR QL: NEGATIVE
OSMOLALITY UR: 435 MMOL/KG (ref 100–1200)
PATH REPORT.COMMENTS IMP SPEC: NORMAL
PATH REPORT.FINAL DX SPEC: NORMAL
PATH REPORT.GROSS SPEC: NORMAL
PATH REPORT.MICROSCOPIC SPEC OTHER STN: NORMAL
PATH REPORT.RELEVANT HX SPEC: NORMAL
PH UR STRIP: 5.5 [PH] (ref 5–7)
PLATELET # BLD AUTO: 45 10E3/UL (ref 150–450)
POTASSIUM SERPL-SCNC: 3.8 MMOL/L (ref 3.4–5.3)
PROT SERPL-MCNC: 4.6 G/DL (ref 6.4–8.3)
RBC # BLD AUTO: 2.69 10E6/UL (ref 4.4–5.9)
RBC URINE: <1 /HPF
SODIUM SERPL-SCNC: 142 MMOL/L (ref 135–145)
SODIUM UR-SCNC: <20 MMOL/L
SP GR UR STRIP: 1.02 (ref 1–1.03)
TRANSITIONAL EPI: <1 /HPF
UROBILINOGEN UR STRIP-MCNC: NORMAL MG/DL
WBC # BLD AUTO: 9.6 10E3/UL (ref 4–11)
WBC URINE: 1 /HPF

## 2024-11-05 PROCEDURE — 84460 ALANINE AMINO (ALT) (SGPT): CPT | Performed by: NURSE PRACTITIONER

## 2024-11-05 PROCEDURE — 99233 SBSQ HOSP IP/OBS HIGH 50: CPT | Performed by: PEDIATRICS

## 2024-11-05 PROCEDURE — 83735 ASSAY OF MAGNESIUM: CPT | Performed by: STUDENT IN AN ORGANIZED HEALTH CARE EDUCATION/TRAINING PROGRAM

## 2024-11-05 PROCEDURE — 250N000013 HC RX MED GY IP 250 OP 250 PS 637: Performed by: NURSE PRACTITIONER

## 2024-11-05 PROCEDURE — 84300 ASSAY OF URINE SODIUM: CPT | Performed by: PEDIATRICS

## 2024-11-05 PROCEDURE — 250N000013 HC RX MED GY IP 250 OP 250 PS 637: Performed by: INTERNAL MEDICINE

## 2024-11-05 PROCEDURE — 82570 ASSAY OF URINE CREATININE: CPT | Performed by: PEDIATRICS

## 2024-11-05 PROCEDURE — 85027 COMPLETE CBC AUTOMATED: CPT | Performed by: NURSE PRACTITIONER

## 2024-11-05 PROCEDURE — 83935 ASSAY OF URINE OSMOLALITY: CPT | Performed by: PEDIATRICS

## 2024-11-05 PROCEDURE — 97140 MANUAL THERAPY 1/> REGIONS: CPT | Mod: GO

## 2024-11-05 PROCEDURE — 84520 ASSAY OF UREA NITROGEN: CPT | Performed by: STUDENT IN AN ORGANIZED HEALTH CARE EDUCATION/TRAINING PROGRAM

## 2024-11-05 PROCEDURE — 99207 PR NO CHARGE LOS: CPT | Performed by: PEDIATRICS

## 2024-11-05 PROCEDURE — 97530 THERAPEUTIC ACTIVITIES: CPT | Mod: GP

## 2024-11-05 PROCEDURE — 250N000012 HC RX MED GY IP 250 OP 636 PS 637: Performed by: NURSE PRACTITIONER

## 2024-11-05 PROCEDURE — 250N000013 HC RX MED GY IP 250 OP 250 PS 637: Performed by: STUDENT IN AN ORGANIZED HEALTH CARE EDUCATION/TRAINING PROGRAM

## 2024-11-05 PROCEDURE — 82310 ASSAY OF CALCIUM: CPT | Performed by: STUDENT IN AN ORGANIZED HEALTH CARE EDUCATION/TRAINING PROGRAM

## 2024-11-05 PROCEDURE — 250N000013 HC RX MED GY IP 250 OP 250 PS 637: Performed by: PHYSICIAN ASSISTANT

## 2024-11-05 PROCEDURE — 250N000011 HC RX IP 250 OP 636: Performed by: STUDENT IN AN ORGANIZED HEALTH CARE EDUCATION/TRAINING PROGRAM

## 2024-11-05 PROCEDURE — 250N000011 HC RX IP 250 OP 636: Performed by: NURSE PRACTITIONER

## 2024-11-05 PROCEDURE — 120N000005 HC R&B MS OVERFLOW UMMC

## 2024-11-05 PROCEDURE — 99232 SBSQ HOSP IP/OBS MODERATE 35: CPT | Mod: GC | Performed by: STUDENT IN AN ORGANIZED HEALTH CARE EDUCATION/TRAINING PROGRAM

## 2024-11-05 PROCEDURE — 84155 ASSAY OF PROTEIN SERUM: CPT | Performed by: NURSE PRACTITIONER

## 2024-11-05 PROCEDURE — 97116 GAIT TRAINING THERAPY: CPT | Mod: GP

## 2024-11-05 PROCEDURE — 81001 URINALYSIS AUTO W/SCOPE: CPT | Performed by: PEDIATRICS

## 2024-11-05 RX ADMIN — OXYCODONE HYDROCHLORIDE 5 MG: 5 TABLET ORAL at 23:17

## 2024-11-05 RX ADMIN — HEPARIN, PORCINE (PF) 10 UNIT/ML INTRAVENOUS SYRINGE 5 ML: at 04:26

## 2024-11-05 RX ADMIN — OXYCODONE HYDROCHLORIDE 5 MG: 5 TABLET ORAL at 18:07

## 2024-11-05 RX ADMIN — PIPERACILLIN SODIUM AND TAZOBACTAM SODIUM 3.38 G: 3; .375 INJECTION, SOLUTION INTRAVENOUS at 23:14

## 2024-11-05 RX ADMIN — PREDNISONE 5 MG: 5 TABLET ORAL at 08:00

## 2024-11-05 RX ADMIN — INSULIN ASPART 2 UNITS: 100 INJECTION, SOLUTION INTRAVENOUS; SUBCUTANEOUS at 12:06

## 2024-11-05 RX ADMIN — PIPERACILLIN SODIUM AND TAZOBACTAM SODIUM 3.38 G: 3; .375 INJECTION, SOLUTION INTRAVENOUS at 00:02

## 2024-11-05 RX ADMIN — ASPIRIN 81 MG CHEWABLE TABLET 81 MG: 81 TABLET CHEWABLE at 07:58

## 2024-11-05 RX ADMIN — INSULIN ASPART 1 UNITS: 100 INJECTION, SOLUTION INTRAVENOUS; SUBCUTANEOUS at 17:33

## 2024-11-05 RX ADMIN — METOPROLOL SUCCINATE 125 MG: 25 TABLET, EXTENDED RELEASE ORAL at 07:59

## 2024-11-05 RX ADMIN — PIPERACILLIN SODIUM AND TAZOBACTAM SODIUM 3.38 G: 3; .375 INJECTION, SOLUTION INTRAVENOUS at 06:01

## 2024-11-05 RX ADMIN — ACETAMINOPHEN 650 MG: 325 TABLET, FILM COATED ORAL at 23:14

## 2024-11-05 RX ADMIN — POLYETHYLENE GLYCOL 3350 17 G: 17 POWDER, FOR SOLUTION ORAL at 07:59

## 2024-11-05 RX ADMIN — PANTOPRAZOLE SODIUM 40 MG: 40 TABLET, DELAYED RELEASE ORAL at 07:59

## 2024-11-05 RX ADMIN — ANTACID TABLETS 500 MG: 500 TABLET, CHEWABLE ORAL at 22:15

## 2024-11-05 RX ADMIN — Medication 950 MG: at 07:59

## 2024-11-05 RX ADMIN — PIPERACILLIN SODIUM AND TAZOBACTAM SODIUM 3.38 G: 3; .375 INJECTION, SOLUTION INTRAVENOUS at 12:06

## 2024-11-05 RX ADMIN — PIPERACILLIN SODIUM AND TAZOBACTAM SODIUM 3.38 G: 3; .375 INJECTION, SOLUTION INTRAVENOUS at 17:33

## 2024-11-05 RX ADMIN — ANTACID TABLETS 500 MG: 500 TABLET, CHEWABLE ORAL at 18:07

## 2024-11-05 RX ADMIN — BUMETANIDE 1 MG: 1 TABLET ORAL at 07:58

## 2024-11-05 RX ADMIN — ACETAMINOPHEN 650 MG: 325 TABLET, FILM COATED ORAL at 18:06

## 2024-11-05 NOTE — PROGRESS NOTES
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      # New Ascites complicated by SBP   # RENNY (baseline Cr 1)  Patient with new onset ascites; diagnostic fluid studies consistent with portal hypertensive ascites 2/2 cirrhosis and cell ct c.w. SBP. Ceftriaxone 2 g daily started 10/30/24, has received albumin and holding diuretics. Cr on admit 1.69, now 1.85    #. Decompensated Cirrhosis   Primary Hepatologist: Dr. Renteria   Etiology: HCV/MASLD   Ascites: + clinically.   - hold PTA 2mg Bumex and 50mg spironolactone  - SBP history: + hx (dx 10/30/24) , repeat para also positive 11/2, switched abx to Zosyn  Hepatic encephalopathy: PTA lactulose and rifaximin  EV: EGD 5/2023 without varices   TIPS: n/a   PV: Patent on US 8/19/24   HCC: CT A/P 10/18/24 without concerning lesions     MELD 3.0: 11 at 8/21/2024  6:34 AM  MELD-Na: 10 at 8/21/2024  6:34 AM  Calculated from:  Serum Creatinine: 1.04 mg/dL at 8/21/2024  6:34 AM  Serum Sodium: 140 mmol/L (Using max of 137 mmol/L) at 8/21/2024  6:34 AM  Total Bilirubin: 1.1 mg/dL at 8/20/2024  6:06 AM  Serum Albumin: 2.7 g/dL at 8/20/2024  6:06 AM  INR(ratio): 1.32 at 8/19/2024  5:14 PM  Age at listing (hypothetical): 74 years  Sex: Male at 8/21/2024  6:34 AM      RECOMMENDATIONS:  - Cont Zosyn   - Hold diuretics for now   - Will repeat diagnostic and therapeutic para on 11/6 w/ no more than 3L removed, give w/ 50g albumin    Thank you for  involving us in this patient's care. Please do not hesitate to contact the GI service with any questions or concerns.     Patient was d/w the attending, Dr. Girish Severino  GI Fellow    _______________________________________________________________      Subjective: Nursing notes and 24hr events reviewed.     Sitting up in chair; has some leg aches; feels like his abdomen is swollen; had heartburn last night; feels constipated    ROS:   4 pt ROS negative unless noted in subjective.     Objective:  Blood pressure 110/44, pulse 66, temperature 98.3  F (36.8  C), temperature source Oral, resp. rate 12, weight 107.2 kg (236 lb 5.3 oz), SpO2 91%.    General: male sitting up in chair. Appears comfortable.  Answers appropriately.    Lungs: on RA, comfortable  Abdomen: distended  Extremities: no edema, erythematous rash bl anterior legs  Musculoskeletal: No gross deformity.  Skin: No jaundice or rash on exposed skin.  Mental status/Psych: A&O. Asks/answers questions appropriately. Pleasant, interactive.    Date 10/31/24 0700 - 11/01/24 0659   Shift 1437-2520 7912-1850 2828-5167 24 Hour Total   INTAKE   I.V. 36.13   36.13   Shift Total(mL/kg) 36.13(0.31)   36.13(0.31)   OUTPUT   Urine 725   725   Shift Total(mL/kg) 725(6.14)   725(6.14)   Weight (kg) 118.07 118.07 118.07 118.07         LABS:  [facility]  Recent Labs   Lab 11/05/24  0755 11/05/24  0426 11/04/24  2158 11/04/24  1623 11/04/24  0745 11/04/24  0438 11/03/24  0750 11/03/24  0428 11/02/24  1755 11/02/24  1538   NA  --  142  --   --   --  140  --  143  --  141   POTASSIUM  --  3.8  --   --   --  4.1  --  3.9  --  4.3   CHLORIDE  --  104  --   --   --  104  --  105  --  105   SOLEDAD  --  7.6*  --   --   --  7.7*  --  7.7*  --  7.8*   CO2  --  28  --   --   --  28  --  28  --  26   BUN  --  51.2*  --   --   --  51.9*  --  44.8*  --  45.4*   CR  --  1.85*  --   --   --  1.72*  --  1.56*  --  1.49*   * 103* 145* 139*   < > 107*   < > 104*   < > 144*    < > =  values in this interval not displayed.     CBC  Recent Labs   Lab 11/05/24 0426 11/04/24 0438 11/03/24 0428 11/02/24 0418   WBC 9.6 11.3* 11.4* 7.6   RBC 2.69* 2.55* 2.50* 2.57*   HGB 8.7* 8.4* 8.0* 8.3*   HCT 27.6* 26.4* 25.7* 26.4*   * 104* 103* 103*   MCH 32.3 32.9 32.0 32.3   MCHC 31.5 31.8 31.1* 31.4*   RDW 21.5* 21.6* 22.1* 22.5*   PLT 45* 47* 47* 44*     INRNo lab results found in last 7 days.  LFTs  Recent Labs   Lab 11/05/24 0426 11/04/24 0438 11/03/24 0428 11/02/24 0418   ALKPHOS 125 123 120 142   AST 26 25 24 29   ALT 12 11 11 11   BILITOTAL 0.8 0.7 0.7 0.7   PROTTOTAL 4.6* 4.3* 4.5* 4.7*   ALBUMIN 2.7* 2.6* 2.8* 2.9*      PANCNo lab results found in last 7 days.

## 2024-11-05 NOTE — PROGRESS NOTES
Care Management Follow Up    Additional information    11/5/24 - CHW delegated by MITUL, has followed up with TCU referrals    Pending:     [facility]  TCU  [address] [place] [address]  Ph: [phone]  Fax: [phone]  11/5: Spoke to Zita, she said this patient will needs a deep review and she hasn't gotten to it yet.     [facility]  [address] [place] [address]  Ph: [phone]  Fax: [phone]  11/5: Spoke to Zita in admissions, they haven't reviewed the pt yet.     [facility]  [address] [place] [address]  Ph: [phone]  Fax: [phone]  11/5: No follow-up call placed, referral sent yesterday        Declined:     [facility]  [address] [facility] [address]  Ph: [phone]  Fax: [phone]  11/4: Acuity too high, cost of cancer treatment    [facility]   [address] [address]  Ph: [phone]  Fax: [phone]  11/5: Spoke with Rut in admissions, she stated they cannot meet pt's needs.       Anca Linder  Community Health Worker  6A, 6B, 6C  Ph [phone]  Fax [phone]

## 2024-11-05 NOTE — PLAN OF CARE
"  Problem: Adult Inpatient Plan of Care  Goal: Plan of Care Review  Description: The Plan of Care Review/Shift note should be completed every shift.  The Outcome Evaluation is a brief statement about your assessment that the patient is improving, declining, or no change.  This information will be displayed automatically on your shift  note.  Outcome: Progressing  Flowsheets (Taken 11/5/2024 1738)  Outcome Evaluation: no changes this shift  Plan of Care Reviewed With: patient  Overall Patient Progress: improving  Goal: Patient-Specific Goal (Individualized)  Description: You can add care plan individualizations to a care plan. Examples of Individualization might be:  \"Parent requests to be called daily at 9am for status\", \"I have a hard time hearing out of my right ear\", or \"Do not touch me to wake me up as it startles  me\".  Outcome: Progressing  Goal: Absence of Hospital-Acquired Illness or Injury  Outcome: Progressing  Intervention: Identify and Manage Fall Risk  Recent Flowsheet Documentation  Taken 11/5/2024 1600 by Brett Ford, RN  Safety Promotion/Fall Prevention:   activity supervised   assistive device/personal items within reach   clutter free environment maintained   room near nurse's station   room organization consistent   safety round/check completed   lighting adjusted  Taken 11/5/2024 1200 by Brett Ford, RN  Safety Promotion/Fall Prevention:   activity supervised   assistive device/personal items within reach   clutter free environment maintained   room near nurse's station   room organization consistent   safety round/check completed   lighting adjusted  Taken 11/5/2024 0800 by Brett Ford, RN  Safety Promotion/Fall Prevention:   activity supervised   assistive device/personal items within reach   clutter free environment maintained   room near nurse's station   room organization consistent   safety round/check completed   lighting adjusted  Intervention: Prevent Skin " Injury  Recent Flowsheet Documentation  Taken 11/5/2024 1600 by Brett Ford RN  Body Position: position changed independently  Skin Protection:   adhesive use limited   silicone foam dressing in place  Taken 11/5/2024 1200 by Brett Ford RN  Body Position: position changed independently  Skin Protection:   adhesive use limited   silicone foam dressing in place  Taken 11/5/2024 0800 by Brett Ford RN  Body Position: position changed independently  Skin Protection:   adhesive use limited   silicone foam dressing in place  Intervention: Prevent and Manage VTE (Venous Thromboembolism) Risk  Recent Flowsheet Documentation  Taken 11/5/2024 1600 by Brett Ford RN  VTE Prevention/Management:   compression stockings off   SCDs off (sequential compression devices)  Taken 11/5/2024 1200 by Brett Ford RN  VTE Prevention/Management:   compression stockings off   SCDs off (sequential compression devices)  Taken 11/5/2024 0800 by Brett Ford RN  VTE Prevention/Management:   compression stockings off   SCDs off (sequential compression devices)  Intervention: Prevent Infection  Recent Flowsheet Documentation  Taken 11/5/2024 1600 by Brett Ford RN  Infection Prevention:   environmental surveillance performed   equipment surfaces disinfected   hand hygiene promoted   personal protective equipment utilized   single patient room provided  Taken 11/5/2024 1200 by Brett Ford RN  Infection Prevention:   environmental surveillance performed   equipment surfaces disinfected   hand hygiene promoted   personal protective equipment utilized   single patient room provided  Taken 11/5/2024 0800 by Brett Ford RN  Infection Prevention:   environmental surveillance performed   equipment surfaces disinfected   hand hygiene promoted   personal protective equipment utilized   single patient room provided  Goal: Optimal Comfort and Wellbeing  Outcome:  Progressing  Intervention: Provide Person-Centered Care  Recent Flowsheet Documentation  Taken 11/5/2024 1600 by Brett Ford RN  Trust Relationship/Rapport:   care explained   choices provided   questions encouraged  Taken 11/5/2024 1200 by Brett Ford RN  Trust Relationship/Rapport:   care explained   choices provided   questions encouraged  Taken 11/5/2024 0800 by Brett Ford RN  Trust Relationship/Rapport:   care explained   choices provided   questions encouraged  Goal: Readiness for Transition of Care  Outcome: Progressing     Problem: Heart Failure  Goal: Optimal Coping  Outcome: Progressing  Intervention: Support Psychosocial Response  Recent Flowsheet Documentation  Taken 11/5/2024 1600 by Brett Ford RN  Supportive Measures:   active listening utilized   goal-setting facilitated   positive reinforcement provided  Family/Support System Care: self-care encouraged  Taken 11/5/2024 1200 by Brett Ford RN  Supportive Measures:   active listening utilized   goal-setting facilitated   positive reinforcement provided  Family/Support System Care: self-care encouraged  Taken 11/5/2024 0800 by Brett Ford RN  Supportive Measures:   active listening utilized   goal-setting facilitated   positive reinforcement provided  Family/Support System Care: self-care encouraged  Goal: Optimal Cardiac Output  Outcome: Progressing  Intervention: Optimize Cardiac Output  Recent Flowsheet Documentation  Taken 11/5/2024 1600 by Brett Ford RN  Environmental Support:   calm environment promoted   environmental consistency promoted   personal routine supported  Taken 11/5/2024 1200 by Brett Ford RN  Environmental Support:   calm environment promoted   environmental consistency promoted   personal routine supported  Taken 11/5/2024 0800 by Brett Ford, RN  Environmental Support:   calm environment promoted   environmental consistency promoted   personal  routine supported  Goal: Stable Heart Rate and Rhythm  Outcome: Progressing  Goal: Fluid and Electrolyte Balance  Outcome: Progressing  Goal: Optimal Functional Ability  Outcome: Progressing  Intervention: Optimize Functional Ability  Recent Flowsheet Documentation  Taken 11/5/2024 1600 by Brett Ford RN  Activity Management: up in chair  Taken 11/5/2024 1200 by Brett Ford RN  Activity Management: up in chair  Taken 11/5/2024 0800 by Brett Ford RN  Activity Management: up in chair  Goal: Improved Oral Intake  Outcome: Progressing  Goal: Effective Oxygenation and Ventilation  Outcome: Progressing  Intervention: Promote Airway Secretion Clearance  Recent Flowsheet Documentation  Taken 11/5/2024 1600 by Brett Ford RN  Cough And Deep Breathing: done independently per patient  Activity Management: up in chair  Taken 11/5/2024 1200 by Brett Ford RN  Cough And Deep Breathing: done independently per patient  Activity Management: up in chair  Taken 11/5/2024 0800 by Brett Ford RN  Cough And Deep Breathing: done independently per patient  Activity Management: up in chair  Intervention: Optimize Oxygenation and Ventilation  Recent Flowsheet Documentation  Taken 11/5/2024 1600 by Brett Ford RN  Head of Bed (HOB) Positioning: HOB at 30-45 degrees  Taken 11/5/2024 1200 by Brett Ford RN  Head of Bed (HOB) Positioning: HOB at 30-45 degrees  Taken 11/5/2024 0800 by Brett Ford RN  Head of Bed (HOB) Positioning: HOB at 30-45 degrees  Goal: Effective Breathing Pattern During Sleep  Outcome: Progressing  Intervention: Monitor and Manage Obstructive Sleep Apnea  Recent Flowsheet Documentation  Taken 11/5/2024 1600 by Brett Ford RN  Medication Review/Management: medications reviewed  Taken 11/5/2024 1200 by Brett Fodr RN  Medication Review/Management: medications reviewed  Taken 11/5/2024 0800 by Brett Ford RN  Medication  Review/Management: medications reviewed     Problem: Fall Injury Risk  Goal: Absence of Fall and Fall-Related Injury  Outcome: Progressing  Intervention: Identify and Manage Contributors  Recent Flowsheet Documentation  Taken 11/5/2024 1600 by Brett Ford RN  Medication Review/Management: medications reviewed  Taken 11/5/2024 1200 by Brett Ford RN  Medication Review/Management: medications reviewed  Taken 11/5/2024 0800 by Brett Ford RN  Medication Review/Management: medications reviewed  Intervention: Promote Injury-Free Environment  Recent Flowsheet Documentation  Taken 11/5/2024 1600 by Brett Ford RN  Safety Promotion/Fall Prevention:   activity supervised   assistive device/personal items within reach   clutter free environment maintained   room near nurse's station   room organization consistent   safety round/check completed   lighting adjusted  Taken 11/5/2024 1200 by Brett Ford RN  Safety Promotion/Fall Prevention:   activity supervised   assistive device/personal items within reach   clutter free environment maintained   room near nurse's station   room organization consistent   safety round/check completed   lighting adjusted  Taken 11/5/2024 0800 by Brett Ford RN  Safety Promotion/Fall Prevention:   activity supervised   assistive device/personal items within reach   clutter free environment maintained   room near nurse's station   room organization consistent   safety round/check completed   lighting adjusted     Problem: Pain Acute  Goal: Optimal Pain Control and Function  Outcome: Progressing  Intervention: Optimize Psychosocial Wellbeing  Recent Flowsheet Documentation  Taken 11/5/2024 1600 by Brett Ford RN  Supportive Measures:   active listening utilized   goal-setting facilitated   positive reinforcement provided  Diversional Activities: television  Taken 11/5/2024 1200 by Brett Ford RN  Supportive Measures:   active  listening utilized   goal-setting facilitated   positive reinforcement provided  Diversional Activities: television  Taken 11/5/2024 0800 by Brett Ford RN  Supportive Measures:   active listening utilized   goal-setting facilitated   positive reinforcement provided  Diversional Activities: television  Intervention: Prevent or Manage Pain  Recent Flowsheet Documentation  Taken 11/5/2024 1600 by Brett Ford RN  Sensory Stimulation Regulation:   television on   quiet environment promoted   lighting decreased  Bowel Elimination Promotion:   commode/bedpan at bedside   ambulation promoted  Medication Review/Management: medications reviewed  Taken 11/5/2024 1200 by Brett Ford RN  Sensory Stimulation Regulation:   television on   quiet environment promoted   lighting decreased  Bowel Elimination Promotion:   commode/bedpan at bedside   ambulation promoted  Medication Review/Management: medications reviewed  Taken 11/5/2024 0800 by Brett Ford RN  Sensory Stimulation Regulation:   television on   quiet environment promoted   lighting decreased  Bowel Elimination Promotion:   commode/bedpan at bedside   ambulation promoted  Medication Review/Management: medications reviewed     Problem: Skin Injury Risk Increased  Goal: Skin Health and Integrity  Outcome: Progressing  Intervention: Optimize Skin Protection  Recent Flowsheet Documentation  Taken 11/5/2024 1600 by Brett Ford RN  Pressure Reduction Techniques:   frequent weight shift encouraged   weight shift assistance provided  Skin Protection:   adhesive use limited   silicone foam dressing in place  Activity Management: up in chair  Head of Bed (HOB) Positioning: HOB at 30-45 degrees  Taken 11/5/2024 1200 by Brett Ford RN  Pressure Reduction Techniques:   frequent weight shift encouraged   weight shift assistance provided  Skin Protection:   adhesive use limited   silicone foam dressing in place  Activity Management:  up in chair  Head of Bed (HOB) Positioning: HOB at 30-45 degrees  Taken 11/5/2024 0800 by Brett Ford, RN  Pressure Reduction Techniques:   frequent weight shift encouraged   weight shift assistance provided  Skin Protection:   adhesive use limited   silicone foam dressing in place  Activity Management: up in chair  Head of Bed (HOB) Positioning: HOB at 30-45 degrees     Problem: Activity Intolerance  Goal: Enhanced Capacity and Energy  Outcome: Progressing  Intervention: Optimize Activity Tolerance  Recent Flowsheet Documentation  Taken 11/5/2024 1600 by Brett Ford, RN  Activity Management: up in chair  Environmental Support:   calm environment promoted   environmental consistency promoted   personal routine supported  Taken 11/5/2024 1200 by Bertt Ford, RN  Activity Management: up in chair  Environmental Support:   calm environment promoted   environmental consistency promoted   personal routine supported  Taken 11/5/2024 0800 by Brett Ford, RN  Activity Management: up in chair  Environmental Support:   calm environment promoted   environmental consistency promoted   personal routine supported   Goal Outcome Evaluation:      Plan of Care Reviewed With: patient    Overall Patient Progress: improvingOverall Patient Progress: improving    Outcome Evaluation: no changes this shift

## 2024-11-05 NOTE — PROGRESS NOTES
Brief progress note - formal note to follow this evening    Diuretic x 1 yesterday due to edema, swelling hasn't really improved. Cr continues to rise, GI recommending repeat para tomorrow but I looked at his belly with ultrasound at bedside and there's not much there to tap. Might be able to get a diagnostic tap, may re-accumulate more tomorrow. Placed CAPS consult for 11/6.   Follow up on Cr tomorrow, if continues to rise will discuss with neph and probably start 48 h of 1 g/kg albumin.  Will additionally do more involved ultrasound assessment of volume status tomorrow.  Remains on zosyn for SBP.    Fracisco Wilkins, DO

## 2024-11-05 NOTE — PLAN OF CARE
"Hours of Care:  1900 - 0700    Neuro: A/O x 4. Forgetful. Makes needs known  Respiratory: On room air. Lung sounds clear. Denies SOB  Cardiac: VSS. Afebrile. SR  GI/: Last BM 11/4. No report of N/V. Voids appropriately via urinal.   Endo: Blood sugars ACHS.  Skin: See flowsheets   LDA:  R Port a Cath Single Lumen SL  Electrolytes: RN managed magnesium and potassium.   Pain: c/o 7/10 abdominal pain and discomfort, pain medication offered, patient requested tums.   Diet: Regular    Potassium 3.8. Not replaced due to increased creatinine.     P: Continue to follow POC and report changes to Gold 7.    Goal Outcome Evaluation:      Plan of Care Reviewed With: patient    Overall Patient Progress: improvingOverall Patient Progress: improving    Problem: Adult Inpatient Plan of Care  Goal: Plan of Care Review  Description: The Plan of Care Review/Shift note should be completed every shift.  The Outcome Evaluation is a brief statement about your assessment that the patient is improving, declining, or no change.  This information will be displayed automatically on your shift  note.  Outcome: Progressing  Flowsheets (Taken 11/5/2024 0103)  Plan of Care Reviewed With: patient  Overall Patient Progress: improving  Goal: Patient-Specific Goal (Individualized)  Description: You can add care plan individualizations to a care plan. Examples of Individualization might be:  \"Parent requests to be called daily at 9am for status\", \"I have a hard time hearing out of my right ear\", or \"Do not touch me to wake me up as it startles  me\".  Outcome: Progressing  Goal: Absence of Hospital-Acquired Illness or Injury  Outcome: Progressing  Intervention: Identify and Manage Fall Risk  Recent Flowsheet Documentation  Taken 11/4/2024 2002 by Nallely Buck RN  Safety Promotion/Fall Prevention:   activity supervised   assistive device/personal items within reach   room near nurse's station  Intervention: Prevent Skin Injury  Recent Flowsheet " Documentation  Taken 11/4/2024 2002 by Nallely Buck RN  Body Position: position changed independently  Intervention: Prevent and Manage VTE (Venous Thromboembolism) Risk  Recent Flowsheet Documentation  Taken 11/4/2024 2002 by Nallely Buck RN  VTE Prevention/Management:   compression stockings off   SCDs off (sequential compression devices)  Intervention: Prevent Infection  Recent Flowsheet Documentation  Taken 11/5/2024 0000 by Nallely Buck RN  Infection Prevention:   hand hygiene promoted   rest/sleep promoted  Taken 11/4/2024 2002 by Nallely Buck RN  Infection Prevention:   hand hygiene promoted   rest/sleep promoted   single patient room provided  Goal: Optimal Comfort and Wellbeing  Outcome: Progressing  Intervention: Monitor Pain and Promote Comfort  Recent Flowsheet Documentation  Taken 11/4/2024 2002 by Nallely Buck RN  Pain Management Interventions: medication (see MAR)  Intervention: Provide Person-Centered Care  Recent Flowsheet Documentation  Taken 11/4/2024 2002 by Nallely Buck RN  Trust Relationship/Rapport:   care explained   choices provided   questions answered  Goal: Readiness for Transition of Care  Outcome: Progressing     Problem: Heart Failure  Goal: Optimal Coping  Outcome: Progressing  Intervention: Support Psychosocial Response  Recent Flowsheet Documentation  Taken 11/4/2024 2002 by Nallely Buck RN  Supportive Measures: active listening utilized  Goal: Optimal Cardiac Output  Outcome: Progressing  Goal: Stable Heart Rate and Rhythm  Outcome: Progressing  Goal: Fluid and Electrolyte Balance  Outcome: Progressing  Goal: Optimal Functional Ability  Outcome: Progressing  Goal: Improved Oral Intake  Outcome: Progressing  Goal: Effective Oxygenation and Ventilation  Outcome: Progressing  Intervention: Promote Airway Secretion Clearance  Recent Flowsheet Documentation  Taken 11/4/2024 2002 by Nallely Buck RN  Cough And Deep Breathing: done independently per patient  Intervention: Optimize  Oxygenation and Ventilation  Recent Flowsheet Documentation  Taken 11/4/2024 2002 by Nallely Buck RN  Head of Bed (HOB) Positioning: HOB at 20-30 degrees  Goal: Effective Breathing Pattern During Sleep  Outcome: Progressing  Intervention: Monitor and Manage Obstructive Sleep Apnea  Recent Flowsheet Documentation  Taken 11/4/2024 2002 by Nallely Buck RN  Medication Review/Management: medications reviewed     Problem: Fall Injury Risk  Goal: Absence of Fall and Fall-Related Injury  Outcome: Progressing  Intervention: Identify and Manage Contributors  Recent Flowsheet Documentation  Taken 11/4/2024 2002 by Nallely Buck RN  Medication Review/Management: medications reviewed  Intervention: Promote Injury-Free Environment  Recent Flowsheet Documentation  Taken 11/4/2024 2002 by Nallely Buck RN  Safety Promotion/Fall Prevention:   activity supervised   assistive device/personal items within reach   room near nurse's station     Problem: Pain Acute  Goal: Optimal Pain Control and Function  Outcome: Progressing  Intervention: Optimize Psychosocial Wellbeing  Recent Flowsheet Documentation  Taken 11/4/2024 2002 by Nallely Buck RN  Supportive Measures: active listening utilized  Intervention: Develop Pain Management Plan  Recent Flowsheet Documentation  Taken 11/4/2024 2002 by Nlalely Buck RN  Pain Management Interventions: medication (see MAR)  Intervention: Prevent or Manage Pain  Recent Flowsheet Documentation  Taken 11/4/2024 2002 by Nallely Buck RN  Sensory Stimulation Regulation:   television on   care clustered   quiet environment promoted  Bowel Elimination Promotion: commode/bedpan at bedside  Medication Review/Management: medications reviewed     Problem: Skin Injury Risk Increased  Goal: Skin Health and Integrity  Outcome: Progressing  Intervention: Plan: Nurse Driven Intervention: Moisture Management  Recent Flowsheet Documentation  Taken 11/4/2024 2002 by Nallely Buck RN  Moisture Interventions:    Encourage regular toileting   Incontinence pad  Intervention: Plan: Nurse Driven Intervention: Friction and Shear  Recent Flowsheet Documentation  Taken 11/4/2024 2002 by Nallely Buck RN  Friction/Shear Interventions: HOB 30 degrees or less  Intervention: Optimize Skin Protection  Recent Flowsheet Documentation  Taken 11/4/2024 2002 by Nallely Buck, RN  Pressure Reduction Techniques: frequent weight shift encouraged  Head of Bed (HOB) Positioning: HOB at 20-30 degrees     Problem: Activity Intolerance  Goal: Enhanced Capacity and Energy  Outcome: Progressing

## 2024-11-05 NOTE — PROGRESS NOTES
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 Ordered for tmrw with labs entered      Skin: 3 quarter sized pressure wounds on coccyx, meplix     LDA's: R port-a cath hep locked, L PIV SL (zosyn)  "

## 2024-11-06 ENCOUNTER — APPOINTMENT (OUTPATIENT)
Dept: OCCUPATIONAL THERAPY | Facility: CLINIC | Age: 74
End: 2024-11-06
Attending: INTERNAL MEDICINE
Payer: COMMERCIAL

## 2024-11-06 LAB
ABSOLUTE NEUTROPHILS, BODY FLUID: 452.3 /UL
ALBUMIN BODY FLUID SOURCE: NORMAL
ALBUMIN FLD-MCNC: 0.9 G/DL
ALBUMIN SERPL BCG-MCNC: 2.6 G/DL (ref 3.5–5.2)
ALP SERPL-CCNC: 137 U/L (ref 40–150)
ALT SERPL W P-5'-P-CCNC: 13 U/L (ref 0–70)
ANION GAP SERPL CALCULATED.3IONS-SCNC: 8 MMOL/L (ref 7–15)
APPEARANCE FLD: CLEAR
AST SERPL W P-5'-P-CCNC: 29 U/L (ref 0–45)
BILIRUB DIRECT SERPL-MCNC: 0.24 MG/DL (ref 0–0.3)
BILIRUB SERPL-MCNC: 0.8 MG/DL
BUN SERPL-MCNC: 51.6 MG/DL (ref 8–23)
CALCIUM SERPL-MCNC: 7.8 MG/DL (ref 8.8–10.4)
CELL COUNT BODY FLUID SOURCE: NORMAL
CHLORIDE SERPL-SCNC: 105 MMOL/L (ref 98–107)
COLOR FLD: YELLOW
CREAT SERPL-MCNC: 1.63 MG/DL (ref 0.67–1.17)
EGFRCR SERPLBLD CKD-EPI 2021: 44 ML/MIN/1.73M2
ERYTHROCYTE [DISTWIDTH] IN BLOOD BY AUTOMATED COUNT: 21.3 % (ref 10–15)
FIBRINOGEN PPP-MCNC: 211 MG/DL (ref 170–510)
GLUCOSE BLDC GLUCOMTR-MCNC: 110 MG/DL (ref 70–99)
GLUCOSE BLDC GLUCOMTR-MCNC: 170 MG/DL (ref 70–99)
GLUCOSE BLDC GLUCOMTR-MCNC: 175 MG/DL (ref 70–99)
GLUCOSE BLDC GLUCOMTR-MCNC: 181 MG/DL (ref 70–99)
GLUCOSE SERPL-MCNC: 106 MG/DL (ref 70–99)
HCO3 SERPL-SCNC: 28 MMOL/L (ref 22–29)
HCT VFR BLD AUTO: 27 % (ref 40–53)
HGB BLD-MCNC: 8.6 G/DL (ref 13.3–17.7)
LYMPHOCYTES NFR FLD MANUAL: 5 %
MAGNESIUM SERPL-MCNC: 2.2 MG/DL (ref 1.7–2.3)
MCH RBC QN AUTO: 32.7 PG (ref 26.5–33)
MCHC RBC AUTO-ENTMCNC: 31.9 G/DL (ref 31.5–36.5)
MCV RBC AUTO: 103 FL (ref 78–100)
MONOS+MACROS NFR FLD MANUAL: 32 %
NEUTS BAND NFR FLD MANUAL: 63 %
PLATELET # BLD AUTO: 39 10E3/UL (ref 150–450)
POTASSIUM SERPL-SCNC: 3.5 MMOL/L (ref 3.4–5.3)
PROT FLD-MCNC: 1.4 G/DL
PROT SERPL-MCNC: 4.4 G/DL (ref 6.4–8.3)
PROTEIN BODY FLUID SOURCE: NORMAL
RBC # BLD AUTO: 2.63 10E6/UL (ref 4.4–5.9)
SODIUM SERPL-SCNC: 141 MMOL/L (ref 135–145)
WBC # BLD AUTO: 7.9 10E3/UL (ref 4–11)
WBC # FLD AUTO: 718 /UL

## 2024-11-06 PROCEDURE — 99233 SBSQ HOSP IP/OBS HIGH 50: CPT | Performed by: PEDIATRICS

## 2024-11-06 PROCEDURE — 97535 SELF CARE MNGMENT TRAINING: CPT | Mod: GO | Performed by: OCCUPATIONAL THERAPIST

## 2024-11-06 PROCEDURE — 89050 BODY FLUID CELL COUNT: CPT | Performed by: PEDIATRICS

## 2024-11-06 PROCEDURE — 89051 BODY FLUID CELL COUNT: CPT | Performed by: PEDIATRICS

## 2024-11-06 PROCEDURE — 97535 SELF CARE MNGMENT TRAINING: CPT | Mod: GO

## 2024-11-06 PROCEDURE — 97110 THERAPEUTIC EXERCISES: CPT | Mod: GO

## 2024-11-06 PROCEDURE — 120N000005 HC R&B MS OVERFLOW UMMC

## 2024-11-06 PROCEDURE — 0W9G3ZZ DRAINAGE OF PERITONEAL CAVITY, PERCUTANEOUS APPROACH: ICD-10-PCS | Performed by: STUDENT IN AN ORGANIZED HEALTH CARE EDUCATION/TRAINING PROGRAM

## 2024-11-06 PROCEDURE — 87070 CULTURE OTHR SPECIMN AEROBIC: CPT | Performed by: PEDIATRICS

## 2024-11-06 PROCEDURE — 250N000012 HC RX MED GY IP 250 OP 636 PS 637: Performed by: NURSE PRACTITIONER

## 2024-11-06 PROCEDURE — 250N000013 HC RX MED GY IP 250 OP 250 PS 637: Performed by: STUDENT IN AN ORGANIZED HEALTH CARE EDUCATION/TRAINING PROGRAM

## 2024-11-06 PROCEDURE — 85027 COMPLETE CBC AUTOMATED: CPT | Performed by: NURSE PRACTITIONER

## 2024-11-06 PROCEDURE — 85384 FIBRINOGEN ACTIVITY: CPT | Performed by: STUDENT IN AN ORGANIZED HEALTH CARE EDUCATION/TRAINING PROGRAM

## 2024-11-06 PROCEDURE — 82374 ASSAY BLOOD CARBON DIOXIDE: CPT | Performed by: STUDENT IN AN ORGANIZED HEALTH CARE EDUCATION/TRAINING PROGRAM

## 2024-11-06 PROCEDURE — 82040 ASSAY OF SERUM ALBUMIN: CPT | Performed by: NURSE PRACTITIONER

## 2024-11-06 PROCEDURE — 250N000011 HC RX IP 250 OP 636: Performed by: STUDENT IN AN ORGANIZED HEALTH CARE EDUCATION/TRAINING PROGRAM

## 2024-11-06 PROCEDURE — 250N000013 HC RX MED GY IP 250 OP 250 PS 637: Performed by: INTERNAL MEDICINE

## 2024-11-06 PROCEDURE — 83735 ASSAY OF MAGNESIUM: CPT | Performed by: STUDENT IN AN ORGANIZED HEALTH CARE EDUCATION/TRAINING PROGRAM

## 2024-11-06 PROCEDURE — 82248 BILIRUBIN DIRECT: CPT | Performed by: NURSE PRACTITIONER

## 2024-11-06 PROCEDURE — 49083 ABD PARACENTESIS W/IMAGING: CPT | Performed by: STUDENT IN AN ORGANIZED HEALTH CARE EDUCATION/TRAINING PROGRAM

## 2024-11-06 PROCEDURE — 82042 OTHER SOURCE ALBUMIN QUAN EA: CPT | Performed by: PEDIATRICS

## 2024-11-06 PROCEDURE — 250N000013 HC RX MED GY IP 250 OP 250 PS 637: Performed by: NURSE PRACTITIONER

## 2024-11-06 PROCEDURE — 99232 SBSQ HOSP IP/OBS MODERATE 35: CPT | Mod: 25 | Performed by: STUDENT IN AN ORGANIZED HEALTH CARE EDUCATION/TRAINING PROGRAM

## 2024-11-06 PROCEDURE — 84157 ASSAY OF PROTEIN OTHER: CPT | Performed by: PEDIATRICS

## 2024-11-06 PROCEDURE — 250N000013 HC RX MED GY IP 250 OP 250 PS 637: Performed by: PEDIATRICS

## 2024-11-06 RX ORDER — POTASSIUM CHLORIDE 750 MG/1
20 TABLET, EXTENDED RELEASE ORAL ONCE
Status: COMPLETED | OUTPATIENT
Start: 2024-11-06 | End: 2024-11-06

## 2024-11-06 RX ADMIN — INSULIN ASPART 1 UNITS: 100 INJECTION, SOLUTION INTRAVENOUS; SUBCUTANEOUS at 11:16

## 2024-11-06 RX ADMIN — ACETAMINOPHEN 650 MG: 325 TABLET, FILM COATED ORAL at 11:11

## 2024-11-06 RX ADMIN — PREDNISONE 5 MG: 5 TABLET ORAL at 07:44

## 2024-11-06 RX ADMIN — POLYETHYLENE GLYCOL 3350 17 G: 17 POWDER, FOR SOLUTION ORAL at 07:44

## 2024-11-06 RX ADMIN — PIPERACILLIN SODIUM AND TAZOBACTAM SODIUM 3.38 G: 3; .375 INJECTION, SOLUTION INTRAVENOUS at 05:12

## 2024-11-06 RX ADMIN — POTASSIUM CHLORIDE 20 MEQ: 750 TABLET, EXTENDED RELEASE ORAL at 07:44

## 2024-11-06 RX ADMIN — ACETAMINOPHEN 650 MG: 325 TABLET, FILM COATED ORAL at 22:32

## 2024-11-06 RX ADMIN — OXYCODONE HYDROCHLORIDE 5 MG: 5 TABLET ORAL at 11:11

## 2024-11-06 RX ADMIN — PANTOPRAZOLE SODIUM 40 MG: 40 TABLET, DELAYED RELEASE ORAL at 07:44

## 2024-11-06 RX ADMIN — PIPERACILLIN SODIUM AND TAZOBACTAM SODIUM 3.38 G: 3; .375 INJECTION, SOLUTION INTRAVENOUS at 17:07

## 2024-11-06 RX ADMIN — OXYCODONE HYDROCHLORIDE 5 MG: 5 TABLET ORAL at 22:31

## 2024-11-06 RX ADMIN — METOPROLOL SUCCINATE 125 MG: 25 TABLET, EXTENDED RELEASE ORAL at 07:44

## 2024-11-06 RX ADMIN — PIPERACILLIN SODIUM AND TAZOBACTAM SODIUM 3.38 G: 3; .375 INJECTION, SOLUTION INTRAVENOUS at 23:51

## 2024-11-06 RX ADMIN — Medication 950 MG: at 07:44

## 2024-11-06 RX ADMIN — ASPIRIN 81 MG CHEWABLE TABLET 81 MG: 81 TABLET CHEWABLE at 07:44

## 2024-11-06 RX ADMIN — INSULIN ASPART 1 UNITS: 100 INJECTION, SOLUTION INTRAVENOUS; SUBCUTANEOUS at 17:07

## 2024-11-06 RX ADMIN — PIPERACILLIN SODIUM AND TAZOBACTAM SODIUM 3.38 G: 3; .375 INJECTION, SOLUTION INTRAVENOUS at 11:17

## 2024-11-06 NOTE — PROGRESS NOTES
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 He was a direct admit 10/29/24 for escalation of his outpatient diuretic regimen ISO HCM, unable to make frequent outpatient labs and appts d/t mobility issues. Initially admitted to the Cardiology service, but hospital course c/b SBP after which he was transferred to the Medicine service on 10/31/24. Patient had initially been on a bumex infusion however after discussion with hepatology this was stopped due to concern for volume depletion. Repeat Para done on 11/2 with only slight decrease in cell count so abx switched from ceftriaxone to zosyn. He has now become a little more edematous so opted to give a dose of bumex 11/4 despite rise in cr after discussion with cards. Plan for repeat para as able to monitor cell count. ARU vs TCU at discharge; patient seems motivated to pursue more aggressive rehab.      Spontaneous Bacterial Peritonitis  Cirrhosis 2/2 HCV, Newly Decompensated, c/b ascites, SBP  Hx of cirrhosis 2/2 HCV (tx as below). PTA pt's cirrhosis had largely been compensated, but since beginning chemotherapy for metastatic prostate cancer (see below), he has had new onset ascites this hospitalization. Paracentesis performed 10/30 showed ascitic fluid was c/w SBP, and he was started on Ceftriaxone at that time, given 50 g 25% albumin. GI following this hospitalization and they recommend holding off on diuresis as they feel he is intravascularly depleted. Of note, primary hepatologist is Dr. Renteria. Last EGD 05/2023 w/o e/o varices. Repeat Para done on 11/2 with only slight decrease in cell count so  abx switched from ceftriaxone to zosyn.   - GI following  - repeat para 11/6 or 11/7 pending re-accumulation to look at cell count     RENNY, suspect prerenal  CKD Stage III  Baseline Cr ~0.8-1 recently. On review of prior BMPs, appears that his Cr periodically rises during periods c/w intravascular depletion ISO.   - hold diuretics per GI; if Cr continues to trend upward can consult nephrology  - add urine Na, urine Cr, urine osm, UA     Hypomagnesemia   ISO RENNY as above. RN protocol for replacement.     HCM with EDGAR with Valsalva LVOTO Gradient  Acute-on-Chronic HFpEF  HTN  Longstanding hx of HCM, recently established care [facility]/ [facility]. Has been struggling w/ worsening BLE edema, increased abdominal distension (weeping ascites), from poorly controlled hypervolemia. PTA diuretic regimen w/ Spironolactone was deemed inadequate, and given his difficulties w/ mobility at home and need for frequent appts/labs w/ titration of OP diuresis, decision was made to directly admit him instead for more aggressive titration of diuretic regimen w/ close cardiac monitoring. Started on Bumex drip here by Cards. Repeat ECHO 10/30/24 showing hyperkinetic w/ EF 65-70%, dynamic outflow tract obstruction, peak gradient 119mmHg at rest, grade II moderate diastolic dysfunction. Transferred to Medicine service 10/31 given SBP.  - Cardiology involved, appreciate assistance              - Spironolactone and Bumex drip stopped 10/31                          - 1 mg bumex  given 11/4              - Hold PTA Valsartan 160mg daily w/ low BP, worsening LVOT gradient   - reduced PTA Metoprolol succinate 150mg daily to 125 mg daily   - Avoid vasodilators   - Goal K >4 and Mg >2              - Lymphedema consulted              - BMP daily              - Strict I/Os              - Daily standing weights (per pt, a 'good' weight for him is 230 lbs)     Metastatic Prostate Cancer  Chronic Pancytopenia   Initially diagnosed earlier this year  "incidentally following a fall at home, fracturing his L femur. ORIF 05/24/24 and curretage samples c/w adenocarcinoma of prostate origin. Has since followed w/ Oncology as an OP, last saw 10/24/24. PTA on Daralutamide BID, Docetaxel (C4D1 was on 10/24/24) and Leuprolide [id number] (last 9/5/24). Also gets Neulasta, last 10/25. Has chronic pancytopenia, likely d/t multiple comorbidities (cirrhosis, CKD) and iatrogenic (chemotherapy). Baseline hgb ~8-9 recently, and had drift today down to 6.9, but no e/o active bleeding.   - Oncology consulted here 10/31 and recommended to continue Daralutamide, they signed off  - Continue PTA Prednisone   - Transfuse for Hgb <7      Type 2 Diabetes Mellitus, non-insulin-dependent, well-controlled  Last A1c 5.8% on 10/29/24. PTA Metformin. BG checks past 24 hours are all within goal (<180).   - Hold PTA Metformin given RENNY as above  - Continue medium sliding scale as started on admission  - BG checks TID AC + at bedtime + 0200  - Hypoglycemia protocol     Diet: Snacks/Supplements Adult: Ensure Clear; Between Meals  Regular Diet Adult    DVT Prophylaxis: Pneumatic Compression Devices  Rankin Catheter: Not present  Lines: PRESENT      Port a Cath 08/12/24 Single Lumen Right-Site Assessment: WDL      Cardiac Monitoring: None  Code Status: Full Code      Clinically Significant Risk Factors               # Hypoalbuminemia: Lowest albumin = 2.5 g/dL at 10/30/2024  8:33 AM, will monitor as appropriate   # Thrombocytopenia: Lowest platelets = 45 in last 2 days, will monitor for bleeding  # Acute Kidney Injury, unspecified: based on a >150% or 0.3 mg/dL increase in last creatinine compared to past 90 day average, will monitor renal function  # Hypertension: Noted on problem list            # Obesity: Estimated body mass index is 33.91 kg/m  as calculated from the following:    Height as of 10/24/24: 1.778 m (5' 10\").    Weight as of this encounter: 107.2 kg (236 lb 5.3 oz).      # " Financial/Environmental Concerns: none         Disposition Plan     Medically Ready for Discharge: Anticipated in 2-4 Days             Fracisco Wilkins,   Hospitalist Service, GOLD TEAM 7  [facility]  Securely message with [employer] (more info)  Text page via [employer] Paging/Directory   See signed in provider for up to date coverage information  ______________________________________________________________________    Interval History   Canelo reports relatively little if any improvement in his swelling. He did have a good response to diuretic yesterday. Functionally improving - working with therapies daily and feels motivated to go to rehab when ready.     Physical Exam   Vital Signs: Temp: 98.3  F (36.8  C) Temp src: Oral BP: 138/66 Pulse: 82   Resp: 18 SpO2: 96 % O2 Device: None (Room air)    Weight: 236 lbs 5.33 oz    Sitting up in bed in no distress  Awake, alert  Speech is clear and coherent  Abd is soft, nontender, mildly distended  POCUS exam of abd reveals small pocket of fluid in LLQ, otherwise relatively little ascites  + pitting edema bilateral LE    MDM: high  See problem list in associated note  Reviewed CMP, CBC, Mg, glucoses, GI consult note,   high risk due to need for continued inpatient management of fluid overload in context of cirrhosis and HCM with diastolic dysfunction, RENNY

## 2024-11-06 NOTE — PLAN OF CARE
"  Problem: Adult Inpatient Plan of Care  Goal: Plan of Care Review  Description: The Plan of Care Review/Shift note should be completed every shift.  The Outcome Evaluation is a brief statement about your assessment that the patient is improving, declining, or no change.  This information will be displayed automatically on your shift  note.  Outcome: Progressing  Flowsheets (Taken 11/6/2024 1416)  Outcome Evaluation: numbness to finger, md aware, heart cath for today  Plan of Care Reviewed With: patient  Overall Patient Progress: no change  Goal: Patient-Specific Goal (Individualized)  Description: You can add care plan individualizations to a care plan. Examples of Individualization might be:  \"Parent requests to be called daily at 9am for status\", \"I have a hard time hearing out of my right ear\", or \"Do not touch me to wake me up as it startles  me\".  Outcome: Progressing  Goal: Absence of Hospital-Acquired Illness or Injury  Outcome: Progressing  Intervention: Identify and Manage Fall Risk  Recent Flowsheet Documentation  Taken 11/6/2024 1200 by Brett Ford RN  Safety Promotion/Fall Prevention:   activity supervised   assistive device/personal items within reach   room near nurse's station   nonskid shoes/slippers when out of bed  Taken 11/6/2024 0902 by Brett Ford, RN  Safety Promotion/Fall Prevention:   activity supervised   assistive device/personal items within reach   room near nurse's station   nonskid shoes/slippers when out of bed  Intervention: Prevent Skin Injury  Recent Flowsheet Documentation  Taken 11/6/2024 1200 by Brett Ford, RN  Body Position: position changed independently  Skin Protection:   adhesive use limited   silicone foam dressing in place  Taken 11/6/2024 0902 by Brett Ford, RN  Body Position: position changed independently  Skin Protection:   adhesive use limited   silicone foam dressing in place  Taken 11/6/2024 0800 by Brett Ford, CASANDRA  Body " Position: position changed independently  Intervention: Prevent and Manage VTE (Venous Thromboembolism) Risk  Recent Flowsheet Documentation  Taken 11/6/2024 1200 by Brett Ford RN  VTE Prevention/Management: compression stockings off  Taken 11/6/2024 0902 by Brett Ford RN  VTE Prevention/Management: compression stockings off  Intervention: Prevent Infection  Recent Flowsheet Documentation  Taken 11/6/2024 1200 by Brett Ford RN  Infection Prevention:   equipment surfaces disinfected   hand hygiene promoted   rest/sleep promoted  Taken 11/6/2024 0902 by Brett Ford RN  Infection Prevention:   equipment surfaces disinfected   hand hygiene promoted   rest/sleep promoted  Goal: Optimal Comfort and Wellbeing  Outcome: Progressing  Intervention: Provide Person-Centered Care  Recent Flowsheet Documentation  Taken 11/6/2024 1200 by Brett Ford RN  Trust Relationship/Rapport:   care explained   choices provided   questions answered  Taken 11/6/2024 0902 by Brett Ford RN  Trust Relationship/Rapport:   care explained   choices provided   questions answered  Goal: Readiness for Transition of Care  Outcome: Progressing     Problem: Heart Failure  Goal: Optimal Coping  Outcome: Progressing  Intervention: Support Psychosocial Response  Recent Flowsheet Documentation  Taken 11/6/2024 1200 by Brett Ford RN  Supportive Measures:   active listening utilized   decision-making supported  Taken 11/6/2024 0902 by Brett Ford RN  Supportive Measures:   active listening utilized   decision-making supported  Goal: Optimal Cardiac Output  Outcome: Progressing  Intervention: Optimize Cardiac Output  Recent Flowsheet Documentation  Taken 11/6/2024 1200 by Brett Ford RN  Environmental Support: calm environment promoted  Taken 11/6/2024 0902 by Brett Ford RN  Environmental Support: calm environment promoted  Goal: Stable Heart Rate and Rhythm  Outcome:  Progressing  Goal: Fluid and Electrolyte Balance  Outcome: Progressing  Goal: Optimal Functional Ability  Outcome: Progressing  Intervention: Optimize Functional Ability  Recent Flowsheet Documentation  Taken 11/6/2024 1200 by Brett Ford RN  Activity Management: activity adjusted per tolerance  Taken 11/6/2024 0902 by Brett Ford RN  Activity Management: activity adjusted per tolerance  Taken 11/6/2024 0800 by Brett Ford RN  Activity Management: activity adjusted per tolerance  Goal: Improved Oral Intake  Outcome: Progressing  Goal: Effective Oxygenation and Ventilation  Outcome: Progressing  Intervention: Promote Airway Secretion Clearance  Recent Flowsheet Documentation  Taken 11/6/2024 1200 by Brett Ford RN  Cough And Deep Breathing: done independently per patient  Activity Management: activity adjusted per tolerance  Taken 11/6/2024 0902 by Brett Ford RN  Cough And Deep Breathing: done independently per patient  Activity Management: activity adjusted per tolerance  Taken 11/6/2024 0800 by Brett Ford RN  Activity Management: activity adjusted per tolerance  Intervention: Optimize Oxygenation and Ventilation  Recent Flowsheet Documentation  Taken 11/6/2024 1200 by Brett Ford RN  Head of Bed (HOB) Positioning: HOB at 30-45 degrees  Taken 11/6/2024 0902 by Brett Ford RN  Head of Bed (HOB) Positioning: HOB at 30-45 degrees  Goal: Effective Breathing Pattern During Sleep  Outcome: Progressing  Intervention: Monitor and Manage Obstructive Sleep Apnea  Recent Flowsheet Documentation  Taken 11/6/2024 1200 by Brett Ford RN  Medication Review/Management: medications reviewed  Taken 11/6/2024 0902 by Brett Ford RN  Medication Review/Management: medications reviewed     Problem: Fall Injury Risk  Goal: Absence of Fall and Fall-Related Injury  Outcome: Progressing  Intervention: Identify and Manage Contributors  Recent Flowsheet  Documentation  Taken 11/6/2024 1200 by Brett Ford RN  Medication Review/Management: medications reviewed  Taken 11/6/2024 0902 by Brett Ford RN  Medication Review/Management: medications reviewed  Intervention: Promote Injury-Free Environment  Recent Flowsheet Documentation  Taken 11/6/2024 1200 by Brett Ford RN  Safety Promotion/Fall Prevention:   activity supervised   assistive device/personal items within reach   room near nurse's station   nonskid shoes/slippers when out of bed  Taken 11/6/2024 0902 by Brett Ford RN  Safety Promotion/Fall Prevention:   activity supervised   assistive device/personal items within reach   room near nurse's station   nonskid shoes/slippers when out of bed     Problem: Pain Acute  Goal: Optimal Pain Control and Function  Outcome: Progressing  Intervention: Optimize Psychosocial Wellbeing  Recent Flowsheet Documentation  Taken 11/6/2024 1200 by Brett Ford RN  Supportive Measures:   active listening utilized   decision-making supported  Taken 11/6/2024 0902 by Brett Ford RN  Supportive Measures:   active listening utilized   decision-making supported  Intervention: Prevent or Manage Pain  Recent Flowsheet Documentation  Taken 11/6/2024 1200 by Brett Ford RN  Bowel Elimination Promotion:   commode/bedpan at bedside   ambulation promoted  Medication Review/Management: medications reviewed  Taken 11/6/2024 0902 by Brett Ford RN  Bowel Elimination Promotion:   commode/bedpan at bedside   ambulation promoted  Medication Review/Management: medications reviewed     Problem: Skin Injury Risk Increased  Goal: Skin Health and Integrity  Outcome: Progressing  Intervention: Optimize Skin Protection  Recent Flowsheet Documentation  Taken 11/6/2024 1200 by Brett Ford RN  Pressure Reduction Techniques:   frequent weight shift encouraged   heels elevated off bed  Skin Protection:   adhesive use limited    silicone foam dressing in place  Activity Management: activity adjusted per tolerance  Head of Bed (HOB) Positioning: HOB at 30-45 degrees  Taken 11/6/2024 0902 by Brett Ford, RN  Pressure Reduction Techniques:   frequent weight shift encouraged   heels elevated off bed  Skin Protection:   adhesive use limited   silicone foam dressing in place  Activity Management: activity adjusted per tolerance  Head of Bed (HOB) Positioning: HOB at 30-45 degrees  Taken 11/6/2024 0800 by Brett Ford, RN  Activity Management: activity adjusted per tolerance     Problem: Activity Intolerance  Goal: Enhanced Capacity and Energy  Outcome: Progressing  Intervention: Optimize Activity Tolerance  Recent Flowsheet Documentation  Taken 11/6/2024 1200 by Brett Ford, RN  Activity Management: activity adjusted per tolerance  Environmental Support: calm environment promoted  Taken 11/6/2024 0902 by Brett Ford, RN  Activity Management: activity adjusted per tolerance  Environmental Support: calm environment promoted  Taken 11/6/2024 0800 by Brett Ford, RN  Activity Management: activity adjusted per tolerance   Goal Outcome Evaluation:      Plan of Care Reviewed With: patient    Overall Patient Progress: no changeOverall Patient Progress: no change    Outcome Evaluation: numbness to finger, md aware, heart cath for today

## 2024-11-06 NOTE — PROGRESS NOTES
Care Management Follow Up    Additional information    11/6 - CHW received update detailed below.    Pending:     [facility]  [address] [place] [address]  Ph: [phone]  Fax: [phone]  11/5: Spoke to Zita, she said this patient will needs a deep review and she hasn't gotten to it yet.     [facility]  [address] [place] [address] [place] [address]  Ph: [phone]  Fax: [phone]  11/5: No follow-up call placed, referral sent yesterday        Declined:     [facility]  [address] [facility] [address]  Ph: [phone]  Fax: [phone]  11/4: Acuity too high, cost of cancer treatment     [facility] [address]  Ph: [phone]  Fax: [phone]  11/5: Spoke with Rut in admissions, she stated they cannot meet pt's needs.    [facility]  [address] [place] [address]  Ph: [phone]  Fax: [phone]  11/5: Spoke to Zita in admissions, they haven't reviewed the pt yet.  11/6: Declined, no bed available      Anca Linder  [facility] Health Worker  6A, 6B, 6C  Ph [phone]  Fax [phone]

## 2024-11-06 NOTE — PLAN OF CARE
"Hours of Care:  1900 - 0700    PRN: oxy x 1    Neuro: A/O x 4. Makes needs known  Respiratory: On room air. Lung sounds clear. Denies SOB  Cardiac: VSS. Afebrile. SR    GI/: Last BM 11/5. No report of N/V. Voids appropriately via urinal  Endo: Blood sugars ACHS.  Skin: See flowsheets  LDA: R Port a Cath Single Lumen SL  Electrolytes: RN managed magnesium and potassium.   Pain: c/o 7/10 hip and knee pain, controlled with prn oxy   Diet: Regular    P: Continue to follow POC and report changes to Gold 7.    Goal Outcome Evaluation:      Plan of Care Reviewed With: patient    Overall Patient Progress: no change    Outcome Evaluation: no changes      Problem: Adult Inpatient Plan of Care  Goal: Plan of Care Review  Description: The Plan of Care Review/Shift note should be completed every shift.  The Outcome Evaluation is a brief statement about your assessment that the patient is improving, declining, or no change.  This information will be displayed automatically on your shift  note.  Outcome: Progressing  Flowsheets (Taken 11/6/2024 0430)  Outcome Evaluation: no changes  Plan of Care Reviewed With: patient  Overall Patient Progress: no change  Goal: Patient-Specific Goal (Individualized)  Description: You can add care plan individualizations to a care plan. Examples of Individualization might be:  \"Parent requests to be called daily at 9am for status\", \"I have a hard time hearing out of my right ear\", or \"Do not touch me to wake me up as it startles  me\".  Outcome: Progressing  Goal: Absence of Hospital-Acquired Illness or Injury  Outcome: Progressing  Intervention: Identify and Manage Fall Risk  Recent Flowsheet Documentation  Taken 11/5/2024 2000 by Nallely Buck, RN  Safety Promotion/Fall Prevention:   activity supervised   assistive device/personal items within reach   room near nurse's station   nonskid shoes/slippers when out of bed  Intervention: Prevent Skin Injury  Recent Flowsheet Documentation  Taken " 11/6/2024 0000 by Nallely Buck RN  Body Position:   turned   left  Taken 11/5/2024 2000 by Nallely Bukc RN  Body Position: position changed independently  Skin Protection:   adhesive use limited   silicone foam dressing in place  Intervention: Prevent and Manage VTE (Venous Thromboembolism) Risk  Recent Flowsheet Documentation  Taken 11/5/2024 2000 by Nallely Buck RN  VTE Prevention/Management: compression stockings off  Intervention: Prevent Infection  Recent Flowsheet Documentation  Taken 11/6/2024 0000 by Nallely Buck RN  Infection Prevention:   equipment surfaces disinfected   hand hygiene promoted   rest/sleep promoted  Taken 11/5/2024 2000 by Nallely Buck RN  Infection Prevention:   equipment surfaces disinfected   hand hygiene promoted   rest/sleep promoted  Goal: Optimal Comfort and Wellbeing  Outcome: Progressing  Intervention: Monitor Pain and Promote Comfort  Recent Flowsheet Documentation  Taken 11/5/2024 2317 by Nallely Buck RN  Pain Management Interventions:   medication (see MAR)   repositioned  Taken 11/5/2024 1936 by Nallely Buck RN  Pain Management Interventions: (declined pain medication)   repositioned   other (see comments)  Intervention: Provide Person-Centered Care  Recent Flowsheet Documentation  Taken 11/5/2024 2000 by Nallely Buck RN  Trust Relationship/Rapport:   care explained   choices provided   questions answered  Goal: Readiness for Transition of Care  Outcome: Progressing     Problem: Heart Failure  Goal: Optimal Coping  Outcome: Progressing  Intervention: Support Psychosocial Response  Recent Flowsheet Documentation  Taken 11/5/2024 2000 by Nallely Buck RN  Supportive Measures:   active listening utilized   decision-making supported  Goal: Optimal Cardiac Output  Outcome: Progressing  Intervention: Optimize Cardiac Output  Recent Flowsheet Documentation  Taken 11/5/2024 2000 by Nallely Buck RN  Environmental Support: calm environment promoted  Goal: Stable Heart Rate and  Rhythm  Outcome: Progressing  Goal: Fluid and Electrolyte Balance  Outcome: Progressing  Goal: Optimal Functional Ability  Outcome: Progressing  Intervention: Optimize Functional Ability  Recent Flowsheet Documentation  Taken 11/5/2024 2000 by Nallely Buck RN  Activity Management: activity adjusted per tolerance  Goal: Improved Oral Intake  Outcome: Progressing  Goal: Effective Oxygenation and Ventilation  Outcome: Progressing  Intervention: Promote Airway Secretion Clearance  Recent Flowsheet Documentation  Taken 11/5/2024 2000 by Nallely Buck RN  Cough And Deep Breathing: done independently per patient  Activity Management: activity adjusted per tolerance  Intervention: Optimize Oxygenation and Ventilation  Recent Flowsheet Documentation  Taken 11/6/2024 0000 by Nallely Buck RN  Head of Bed (HOB) Positioning: HOB at 20-30 degrees  Taken 11/5/2024 2000 by Nallely Buck RN  Head of Bed (HOB) Positioning: HOB at 30-45 degrees  Goal: Effective Breathing Pattern During Sleep  Outcome: Progressing  Intervention: Monitor and Manage Obstructive Sleep Apnea  Recent Flowsheet Documentation  Taken 11/5/2024 2000 by Nallely Buck RN  Medication Review/Management: medications reviewed     Problem: Fall Injury Risk  Goal: Absence of Fall and Fall-Related Injury  Outcome: Progressing  Intervention: Identify and Manage Contributors  Recent Flowsheet Documentation  Taken 11/5/2024 2000 by Nallely Buck RN  Medication Review/Management: medications reviewed  Intervention: Promote Injury-Free Environment  Recent Flowsheet Documentation  Taken 11/5/2024 2000 by Nallely Buck RN  Safety Promotion/Fall Prevention:   activity supervised   assistive device/personal items within reach   room near nurse's station   nonskid shoes/slippers when out of bed     Problem: Pain Acute  Goal: Optimal Pain Control and Function  Outcome: Progressing  Intervention: Optimize Psychosocial Wellbeing  Recent Flowsheet Documentation  Taken 11/5/2024  2000 by Nallely Buck RN  Supportive Measures:   active listening utilized   decision-making supported  Intervention: Develop Pain Management Plan  Recent Flowsheet Documentation  Taken 11/5/2024 2317 by Nallely Buck RN  Pain Management Interventions:   medication (see MAR)   repositioned  Taken 11/5/2024 1936 by Nallely Buck RN  Pain Management Interventions: (declined pain medication)   repositioned   other (see comments)  Intervention: Prevent or Manage Pain  Recent Flowsheet Documentation  Taken 11/5/2024 2000 by Nallely Buck RN  Bowel Elimination Promotion:   commode/bedpan at bedside   ambulation promoted  Medication Review/Management: medications reviewed     Problem: Skin Injury Risk Increased  Goal: Skin Health and Integrity  Outcome: Progressing  Intervention: Plan: Nurse Driven Intervention: Moisture Management  Recent Flowsheet Documentation  Taken 11/5/2024 2000 by Nallely Buck RN  Moisture Interventions: Encourage regular toileting  Taken 11/5/2024 1959 by Nallely Buck RN  Moisture Interventions: Encourage regular toileting  Intervention: Plan: Nurse Driven Intervention: Friction and Shear  Recent Flowsheet Documentation  Taken 11/5/2024 2000 by Nallely Buck RN  Friction/Shear Interventions: HOB 30 degrees or less  Intervention: Optimize Skin Protection  Recent Flowsheet Documentation  Taken 11/6/2024 0000 by Nallely Buck RN  Head of Bed (HOB) Positioning: HOB at 20-30 degrees  Taken 11/5/2024 2000 by Nallely Buck RN  Pressure Reduction Techniques:   frequent weight shift encouraged   heels elevated off bed  Skin Protection:   adhesive use limited   silicone foam dressing in place  Activity Management: activity adjusted per tolerance  Head of Bed (HOB) Positioning: HOB at 30-45 degrees     Problem: Activity Intolerance  Goal: Enhanced Capacity and Energy  Outcome: Progressing  Intervention: Optimize Activity Tolerance  Recent Flowsheet Documentation  Taken 11/5/2024 2000 by Nallely Buck  RN  Activity Management: activity adjusted per tolerance  Environmental Support: calm environment promoted

## 2024-11-06 NOTE — PROGRESS NOTES
[facility]    Medicine Progress Note - Hospitalist Service, GOLD TEAM 7    Date of Admission:  10/29/2024    Assessment & Plan   Gucci Logan is a 74 year old male with a past medical history of HCM w/ EDGAR, w/ valsalva LVOTO gradient, HFpEF, HTN, Metastatic Prostate Cancer, Decompensated Cirrhosis 2/2 HCV c/b ascites, SBP, CKD Stage III, T2DM, class II obesity. He was a direct admit 10/29/24 for escalation of his outpatient diuretic regimen ISO HCM, unable to make frequent outpatient labs and appts d/t mobility issues. Initially admitted to the Cardiology service, but hospital course c/b SBP after which he was transferred to the Medicine service on 10/31/24. Patient had initially been on a bumex infusion however after discussion with hepatology this was stopped due to concern for volume depletion. Repeat Para done on 11/2 with only slight decrease in cell count so abx switched from ceftriaxone to zosyn. He has now become a little more edematous so opted to give a dose of bumex 11/4 despite rise in cr after discussion with cards. Plan for repeat para as able to monitor cell count. ARU vs TCU at discharge; patient seems motivated to pursue more aggressive rehab.      Spontaneous Bacterial Peritonitis  Cirrhosis 2/2 HCV, Newly Decompensated, c/b ascites, SBP  Hx of cirrhosis 2/2 HCV (tx as below). PTA pt's cirrhosis had largely been compensated, but since beginning chemotherapy for metastatic prostate cancer (see below), he has had new onset ascites this hospitalization. Paracentesis performed 10/30 showed ascitic fluid was c/w SBP, and he was started on Ceftriaxone at that time, given 50 g 25% albumin. GI following this hospitalization and they recommend holding off on diuresis as they feel he is intravascularly depleted. Of note, primary hepatologist is Dr. Renteria. Last EGD 05/2023 w/o e/o varices. Repeat Para done on 11/2 with only slight decrease in cell count so  abx switched from ceftriaxone to zosyn.   - GI following  - repeat para today with 1.5 L out and improving cell count     RENNY, suspect prerenal, improving  CKD Stage III  Baseline Cr ~0.8-1 recently. On review of prior BMPs, appears that his Cr periodically rises during periods c/w intravascular depletion ISO.   - hold diuretics per GI; if Cr continues to trend upward can consult nephrology  - urine sodium low and urine concentrated, Cr improved with holding diuretic -- probably dry and will benefit from continuing to hold diuretic + normal intake +/- albumin     Hypomagnesemia   ISO RENNY as above. RN protocol for replacement.     HCM with EDGAR with Valsalva LVOTO Gradient  Acute-on-Chronic HFpEF  HTN  Longstanding hx of HCM, recently established care w/ [facility] for [facility]. Has been struggling w/ worsening BLE edema, increased abdominal distension (weeping ascites), from poorly controlled hypervolemia. PTA diuretic regimen w/ Spironolactone was deemed inadequate, and given his difficulties w/ mobility at home and need for frequent appts/labs w/ titration of OP diuresis, decision was made to directly admit him instead for more aggressive titration of diuretic regimen w/ close cardiac monitoring. Started on Bumex drip here by Cards. Repeat ECHO 10/30/24 showing hyperkinetic w/ EF 65-70%, dynamic outflow tract obstruction, peak gradient 119mmHg at rest, grade II moderate diastolic dysfunction. Transferred to Medicine service 10/31 given SBP.  - Cardiology involved, appreciate assistance              - Spironolactone and Bumex drip stopped 10/31                          - 1 mg bumex  given 11/4              - Hold PTA Valsartan 160mg daily w/ low BP, worsening LVOT gradient   - reduced PTA Metoprolol succinate 150mg daily to 125 mg daily   - Avoid vasodilators   - Goal K >4 and Mg >2              - Lymphedema consulted              - BMP daily              - Strict I/Os              - Daily standing weights (per pt, a  'good' weight for him is 230 lbs)     Metastatic Prostate Cancer  Chronic Pancytopenia   Initially diagnosed earlier this year incidentally following a fall at home, fracturing his L femur. ORIF 05/24/24 and curretage samples c/w adenocarcinoma of prostate origin. Has since followed w/ Oncology as an OP, last saw 10/24/24. PTA on Daralutamide BID, Docetaxel (C4D1 was on 10/24/24) and Leuprolide [id number] (last 9/5/24). Also gets Neulasta, last 10/25. Has chronic pancytopenia, likely d/t multiple comorbidities (cirrhosis, CKD) and iatrogenic (chemotherapy). Baseline hgb ~8-9 recently, and had drift today down to 6.9, but no e/o active bleeding.   - Oncology consulted here 10/31 and recommended to continue Daralutamide, they signed off  - Continue PTA Prednisone   - Transfuse for Hgb <7      Type 2 Diabetes Mellitus, non-insulin-dependent, well-controlled  Last A1c 5.8% on 10/29/24. PTA Metformin. BG checks past 24 hours are all within goal (<180).   - Hold PTA Metformin given RENNY as above  - Continue medium sliding scale as started on admission  - BG checks TID AC + at bedtime + 0200  - Hypoglycemia protocol     Diet: Snacks/Supplements Adult: Ensure Clear; Between Meals  Regular Diet Adult    DVT Prophylaxis: Pneumatic Compression Devices  Rankin Catheter: Not present  Lines: PRESENT      Port a Cath 08/12/24 Single Lumen Right-Site Assessment: WDL      Cardiac Monitoring: None  Code Status: Full Code      Diet: Snacks/Supplements Adult: Ensure Clear; Between Meals  Regular Diet Adult    DVT Prophylaxis: low plt (<50), holding  Rankin Catheter: Not present  Lines: PRESENT      Port a Cath 08/12/24 Single Lumen Right-Site Assessment: WDL      Cardiac Monitoring: None  Code Status: Full Code      Clinically Significant Risk Factors               # Hypoalbuminemia: Lowest albumin = 2.5 g/dL at 10/30/2024  8:33 AM, will monitor as appropriate   # Thrombocytopenia: Lowest platelets = 39 in last 2 days, will monitor for  "bleeding   # Hypertension: Noted on problem list            # Obesity: Estimated body mass index is 33.91 kg/m  as calculated from the following:    Height as of 10/24/24: 1.778 m (5' 10\").    Weight as of this encounter: 107.2 kg (236 lb 4.8 oz).      # Financial/Environmental Concerns: none         Disposition Plan     Medically Ready for Discharge: Anticipated in 2-4 Days    Fracisco Wilkins DO  Hospitalist Service, GOLD TEAM   [facility]  Securely message with [employer] (more info)  Text page via [facility] Paging/Directory   See signed in provider for up to date coverage information  ______________________________________________________________________    Interval History   Canelo is feeling well today and doesn't have acute complaints. We discussed his suitability for a rehab stay - he is leaning toward TCU rather than ARU. He is still getting quite short of breath with transfers and walking to the bathroom. Continues to work with therapies.     Physical Exam   Vital Signs: Temp: 97.7  F (36.5  C) Temp src: Oral BP: 110/49 Pulse: 66   Resp: 17 SpO2: 94 % O2 Device: None (Room air)    Weight: 236 lbs 4.8 oz    Lying in bed in no distress  Awake, alert  Speech is clear and coherent  RR and WOB normal    POCUS exam of abdominal vessels demonstrative of normal Doppler pattern in portal vein and arcuate vessels of kidney on right    MDM: high  See problem list in associated note  Reviewed CBC, CMP, urine sodium, urine cr, urine osm, cell count from para  high risk due to need for continued inpatient management of SBP, RENNY in setting of cirrhosis and HCM  "

## 2024-11-06 NOTE — PROGRESS NOTES
"CLINICAL NUTRITION SERVICES     Per RN, pt would not like to receive Ensure Clears.    INTERVENTIONS:  Implementation:  Changed oral supplement order to prn.    Follow up/Monitoring:  Will continue to follow pt.    Aylin Quezada, MS, RD, LD, CNSC      No longer available via pager  6C (beds [id number] and [phone]): Vocera \"6C Clinical Dietitian\"   Weekend/Holiday: Vocera \"Weekend Clinical Dietitian\"   "

## 2024-11-06 NOTE — PROCEDURES
[facility]  CAPS PROCEDURE NOTE  Date of Admission:  10/29/2024  Consult Requested by: Medicine  Reason for Consult: diagnostic evaluation of ascites and management of symptomatic ascites    Indication/HPI: Ascites    Pre-Procedure Diagnosis: Ascites    Post-Procedure Diagnosis: Ascites    Risk Assessment: Average risk, Technically straightforward; patient's anticoagulation has been held according to guidelines based on the agent and platelets and coags are within guidelines    Procedure Outcome:  Diagnostic paracentesis performed and Therapeutic paracentesis performed with 1.5 liters of ascites removed. Discussed with primary team.   See additional procedure details below.    The primary covering service should follow up and address any lab results as appropriate.    Zahida Nickerson DO  [facility]  Securely message with Vocera (more info)  Text page via [employer] Paging/Directory   See signed in provider for up to date coverage information      [facility]    Paracentesis    Date/Time: 11/6/2024 10:04 AM    Performed by: Zahida Nickerson DO  Authorized by: Zahida Nickerson DO    PRE-PROCEDURE DETAILS  Procedure purpose: diagnostic and therapeutic  Indications: abdominal discomfort secondary to ascites        UNIVERSAL PROTOCOL   Site Marked: Yes  Prior Images Obtained and Reviewed:  Yes  Required items: Required blood products, implants, devices and special equipment available    Patient identity confirmed:  Verbally with patient, arm band, provided demographic data and hospital-assigned identification number  NA - No sedation, light sedation, or local anesthesia  Confirmation Checklist:  Patient's identity using two indicators, relevant allergies, procedure was appropriate and matched the consent or emergent situation and correct equipment/implants were available  Time out:  Immediately prior to the procedure a time out was called    Universal Protocol: the Joint Commission Universal Protocol was followed    Preparation: Patient was prepped and draped in usual sterile fashion    ESBL (mL):  2     ANESTHESIA    Anesthesia:  Local infiltration  Local Anesthetic:  Lidocaine 1% without epinephrine  Anesthetic Total (mL):  8      SEDATION    Patient Sedated: No    POST PROCEDURE DETAILS  Needle gauge: 19.  Ultrasound guidance: yes  Puncture site: left lower quadrant  Fluid removed: 1500(ml)  Fluid appearance: serous  Dressing: Sterile Bandage.        PROCEDURE    Patient Tolerance:  Patient tolerated the procedure well with no immediate complications  Length of time physician/provider present for 1:1 monitoring during sedation: 0        POC US GUIDE FOR PARACENTESIS     Impression  US Indication: abdominal distension    Limited abdominal ultrasound was performed and demonstrated an adequate fluid collection on the left side of the abdomen.    Doppler of the skin demonstrated an area at this site without significant vasculature.  A paracentesis at this site was subsequently performed.    Zahida Nickerson DO

## 2024-11-06 NOTE — PROGRESS NOTES
Care Management Follow Up    Length of Stay (days): 8    Expected Discharge Date: 11/06/2024     Concerns to be Addressed: discharge planning     Patient plan of care discussed at interdisciplinary rounds: Yes    Anticipated Discharge Disposition: Transitional Care     Anticipated Discharge Services: None  Anticipated Discharge DME: None    Patient/family educated on Medicare website which has current facility and service quality ratings:  Yes  Education Provided on the Discharge Plan:    Patient/Family in Agreement with the Plan: yes    Referrals Placed by CM/SW:    Private pay costs discussed: Not applicable    Discussed  Partnership in Safe Discharge Planning  document with patient/family: No     Handoff Completed: No, handoff not indicated or clinically appropriate    Additional Information:  Writer received a message from ABDELRAHMAN Linder that [facility] was attempting to follow up on the referral sent from [facility] to them. Writer called [facility] admissions back and LVM requesting a call back to understand what information social work could provide to admissions. Writer also spoke with  TCU admissions to get an update on what the status of this pt referral is. Admissions shared they will try to review this pt if they have time today.     [facility]    [facility]  [address] [place] [address]  [address]  P: [phone]   Adm P: [phone]  F: [phone]  11/6 spoke with admissions, no update yet    [facility]  [address] [place]  [address]  P: [phone]  F: [phone]  11/6 LVM for admissions    Declined:    [address] [place] [address]  Main P: [phone]  Main F: [phone]  Adm P: [phone]  Adm F: [phone]  [facility] Fax: [phone]  [facility] Fax: [phone]  11/6 acuity too high, cost of medications    [facility]  [address] [place] [address]  P: [phone]  Adm P: [phone]  F: [phone])   cannot meet pt needs    [facility] [address] [place]  [address]  P: [phone]  Admissions: [phone]  F: [phone]   no bed available.      Next Steps: SW to coordinate discharge planning, complete PAS and coordinate transportation if medically appropriate.       NICHOLE Dugan  Float   CoverinC MITUL  Ph: [phone]  [facility]  Care Management Department    Securely message me on Vocera

## 2024-11-06 NOTE — PROGRESS NOTES
"Full                       Bednarski, \"Canelo\"              Gold 7  See allergies       10/[birth year]   74yr old     Dx:  HF     DX:HCM w/ EDGAR, w/ valsalva LVOTO gradient, HFpEF, HTN, Metastatic Prostate Cancer, Decompensated Cirrhosis 2/2 HCV c/b ascites, SBP, CKD Stage III, T2DM, class II obesity      Neuro: A&O x4, sometimes forgetful              Up with assist x1 walker              Oxy/tylenol              Amb in flores/up to recliner     Cardiac: SR                   Lymphedema wraps                    Resp: RA     GI/: Frequent urine output in urinal, last BM on 11/5              2g NA restriction, 2L fluid restriction              Pericentesis ~1.5 liters removed      Skin: 3 quarter sized pressure wounds on coccyx, meplix     LDA's: R port-a cath hep locked, L PIV SL (zosyn)  "

## 2024-11-06 NOTE — TELEPHONE ENCOUNTER
Dr. Renteria ok with seeing patient sooner for a video visit. Dr. Renteria had an opening on 11/25 writer scheduled the patient. Left detailed voice message with the patient about appointment date and time. Let patient know he will get a link to texted to him the day of the appointment.    Ava SNELL,NINI  Hepatology Clinic

## 2024-11-06 NOTE — PLAN OF CARE
"  Problem: Adult Inpatient Plan of Care  Goal: Plan of Care Review  Description: The Plan of Care Review/Shift note should be completed every shift.  The Outcome Evaluation is a brief statement about your assessment that the patient is improving, declining, or no change.  This information will be displayed automatically on your shift  note.  11/6/2024 1429 by Brett Ford RN  Outcome: Progressing  Flowsheets (Taken 11/6/2024 1427)  Outcome Evaluation: previous outcome note deleted----up with therapy, paracentsis done at bedside  Plan of Care Reviewed With:   patient   other (see comments)  Overall Patient Progress: no change  11/6/2024 1416 by Brett Ford RN  Outcome: Progressing  Flowsheets (Taken 11/6/2024 1416)  Outcome Evaluation: numbness to finger, md aware, heart cath for today  Plan of Care Reviewed With: patient  Overall Patient Progress: no change  Goal: Patient-Specific Goal (Individualized)  Description: You can add care plan individualizations to a care plan. Examples of Individualization might be:  \"Parent requests to be called daily at 9am for status\", \"I have a hard time hearing out of my right ear\", or \"Do not touch me to wake me up as it startles  me\".  11/6/2024 1429 by Brett Ford RN  Outcome: Progressing  11/6/2024 1416 by Brett Ford RN  Outcome: Progressing  Goal: Absence of Hospital-Acquired Illness or Injury  11/6/2024 1429 by Brett Ford RN  Outcome: Progressing  11/6/2024 1416 by Brett Ford RN  Outcome: Progressing  Intervention: Identify and Manage Fall Risk  Recent Flowsheet Documentation  Taken 11/6/2024 1200 by Brett Ford RN  Safety Promotion/Fall Prevention:   activity supervised   assistive device/personal items within reach   room near nurse's station   nonskid shoes/slippers when out of bed  Taken 11/6/2024 0902 by Brett Ford RN  Safety Promotion/Fall Prevention:   activity supervised   assistive " device/personal items within reach   room near nurse's station   nonskid shoes/slippers when out of bed  Intervention: Prevent Skin Injury  Recent Flowsheet Documentation  Taken 11/6/2024 1200 by Brett Ford RN  Body Position: position changed independently  Skin Protection:   adhesive use limited   silicone foam dressing in place  Taken 11/6/2024 0902 by Brett Ford RN  Body Position: position changed independently  Skin Protection:   adhesive use limited   silicone foam dressing in place  Taken 11/6/2024 0800 by Brett Ford RN  Body Position: position changed independently  Intervention: Prevent and Manage VTE (Venous Thromboembolism) Risk  Recent Flowsheet Documentation  Taken 11/6/2024 1200 by Brett Ford RN  VTE Prevention/Management: compression stockings off  Taken 11/6/2024 0902 by Brett Ford RN  VTE Prevention/Management: compression stockings off  Intervention: Prevent Infection  Recent Flowsheet Documentation  Taken 11/6/2024 1200 by Brett Ford RN  Infection Prevention:   equipment surfaces disinfected   hand hygiene promoted   rest/sleep promoted  Taken 11/6/2024 0902 by Brett Ford RN  Infection Prevention:   equipment surfaces disinfected   hand hygiene promoted   rest/sleep promoted  Goal: Optimal Comfort and Wellbeing  11/6/2024 1429 by Brett Ford RN  Outcome: Progressing  11/6/2024 1416 by Brett Ford RN  Outcome: Progressing  Intervention: Provide Person-Centered Care  Recent Flowsheet Documentation  Taken 11/6/2024 1200 by Brett Ford RN  Trust Relationship/Rapport:   care explained   choices provided   questions answered  Taken 11/6/2024 0902 by Brett Ford RN  Trust Relationship/Rapport:   care explained   choices provided   questions answered  Goal: Readiness for Transition of Care  11/6/2024 1429 by Brett Ford RN  Outcome: Progressing  11/6/2024 1416 by Brett Ford  RN  Outcome: Progressing     Problem: Heart Failure  Goal: Optimal Coping  11/6/2024 1429 by Brett Ford RN  Outcome: Progressing  11/6/2024 1416 by Brett Ford RN  Outcome: Progressing  Intervention: Support Psychosocial Response  Recent Flowsheet Documentation  Taken 11/6/2024 1200 by Brett Ford RN  Supportive Measures:   active listening utilized   decision-making supported  Taken 11/6/2024 0902 by Brett Ford RN  Supportive Measures:   active listening utilized   decision-making supported  Goal: Optimal Cardiac Output  11/6/2024 1429 by Brett Ford RN  Outcome: Progressing  11/6/2024 1416 by Brett Ford RN  Outcome: Progressing  Intervention: Optimize Cardiac Output  Recent Flowsheet Documentation  Taken 11/6/2024 1200 by Brett Ford RN  Environmental Support: calm environment promoted  Taken 11/6/2024 0902 by Brett Ford RN  Environmental Support: calm environment promoted  Goal: Stable Heart Rate and Rhythm  11/6/2024 1429 by Brett Ford RN  Outcome: Progressing  11/6/2024 1416 by Brett Ford RN  Outcome: Progressing  Goal: Fluid and Electrolyte Balance  11/6/2024 1429 by Brett Ford RN  Outcome: Progressing  11/6/2024 1416 by Brett Ford RN  Outcome: Progressing  Goal: Optimal Functional Ability  11/6/2024 1429 by Brett Ford RN  Outcome: Progressing  11/6/2024 1416 by Brett Ford RN  Outcome: Progressing  Intervention: Optimize Functional Ability  Recent Flowsheet Documentation  Taken 11/6/2024 1200 by Brett Ford RN  Activity Management: activity adjusted per tolerance  Taken 11/6/2024 0902 by Brett Ford RN  Activity Management: activity adjusted per tolerance  Taken 11/6/2024 0800 by Brett Ford, RN  Activity Management: activity adjusted per tolerance  Goal: Improved Oral Intake  11/6/2024 1429 by Brett Ford RN  Outcome: Progressing  11/6/2024  1416 by Brett Ford RN  Outcome: Progressing  Goal: Effective Oxygenation and Ventilation  11/6/2024 1429 by Brett Ford RN  Outcome: Progressing  11/6/2024 1416 by Brett Ford RN  Outcome: Progressing  Intervention: Promote Airway Secretion Clearance  Recent Flowsheet Documentation  Taken 11/6/2024 1200 by Brett Ford RN  Cough And Deep Breathing: done independently per patient  Activity Management: activity adjusted per tolerance  Taken 11/6/2024 0902 by Brett Ford RN  Cough And Deep Breathing: done independently per patient  Activity Management: activity adjusted per tolerance  Taken 11/6/2024 0800 by Brett Ford RN  Activity Management: activity adjusted per tolerance  Intervention: Optimize Oxygenation and Ventilation  Recent Flowsheet Documentation  Taken 11/6/2024 1200 by Brett Ford RN  Head of Bed (HOB) Positioning: HOB at 30-45 degrees  Taken 11/6/2024 0902 by Brett Ford RN  Head of Bed (HOB) Positioning: HOB at 30-45 degrees  Goal: Effective Breathing Pattern During Sleep  11/6/2024 1429 by Brett Ford RN  Outcome: Progressing  11/6/2024 1416 by Brett Ford RN  Outcome: Progressing  Intervention: Monitor and Manage Obstructive Sleep Apnea  Recent Flowsheet Documentation  Taken 11/6/2024 1200 by Brett Ford RN  Medication Review/Management: medications reviewed  Taken 11/6/2024 0902 by Brett Ford RN  Medication Review/Management: medications reviewed     Problem: Fall Injury Risk  Goal: Absence of Fall and Fall-Related Injury  11/6/2024 1429 by Brett Ford RN  Outcome: Progressing  11/6/2024 1416 by Brett Ford RN  Outcome: Progressing  Intervention: Identify and Manage Contributors  Recent Flowsheet Documentation  Taken 11/6/2024 1200 by Brett Ford RN  Medication Review/Management: medications reviewed  Taken 11/6/2024 0902 by Brett Ford RN  Medication  Review/Management: medications reviewed  Intervention: Promote Injury-Free Environment  Recent Flowsheet Documentation  Taken 11/6/2024 1200 by Brett Ford RN  Safety Promotion/Fall Prevention:   activity supervised   assistive device/personal items within reach   room near nurse's station   nonskid shoes/slippers when out of bed  Taken 11/6/2024 0902 by Brett Ford RN  Safety Promotion/Fall Prevention:   activity supervised   assistive device/personal items within reach   room near nurse's station   nonskid shoes/slippers when out of bed     Problem: Pain Acute  Goal: Optimal Pain Control and Function  11/6/2024 1429 by Brett Ford RN  Outcome: Progressing  11/6/2024 1416 by Brett Ford RN  Outcome: Progressing  Intervention: Optimize Psychosocial Wellbeing  Recent Flowsheet Documentation  Taken 11/6/2024 1200 by Brett Ford RN  Supportive Measures:   active listening utilized   decision-making supported  Taken 11/6/2024 0902 by Brett Ford RN  Supportive Measures:   active listening utilized   decision-making supported  Intervention: Prevent or Manage Pain  Recent Flowsheet Documentation  Taken 11/6/2024 1200 by Brett Ford RN  Bowel Elimination Promotion:   commode/bedpan at bedside   ambulation promoted  Medication Review/Management: medications reviewed  Taken 11/6/2024 0902 by Brett Ford RN  Bowel Elimination Promotion:   commode/bedpan at bedside   ambulation promoted  Medication Review/Management: medications reviewed     Problem: Skin Injury Risk Increased  Goal: Skin Health and Integrity  11/6/2024 1429 by Brett Ford, RN  Outcome: Progressing  11/6/2024 1416 by Brett Ford RN  Outcome: Progressing  Intervention: Optimize Skin Protection  Recent Flowsheet Documentation  Taken 11/6/2024 1200 by Brett Ford RN  Pressure Reduction Techniques:   frequent weight shift encouraged   heels elevated off bed  Skin  Protection:   adhesive use limited   silicone foam dressing in place  Activity Management: activity adjusted per tolerance  Head of Bed (HOB) Positioning: HOB at 30-45 degrees  Taken 11/6/2024 0902 by Brett Ford RN  Pressure Reduction Techniques:   frequent weight shift encouraged   heels elevated off bed  Skin Protection:   adhesive use limited   silicone foam dressing in place  Activity Management: activity adjusted per tolerance  Head of Bed (HOB) Positioning: HOB at 30-45 degrees  Taken 11/6/2024 0800 by Brett Ford RN  Activity Management: activity adjusted per tolerance     Problem: Activity Intolerance  Goal: Enhanced Capacity and Energy  11/6/2024 1429 by Brett Ford RN  Outcome: Progressing  11/6/2024 1416 by Brett Ford RN  Outcome: Progressing  Intervention: Optimize Activity Tolerance  Recent Flowsheet Documentation  Taken 11/6/2024 1200 by Brett Ford RN  Activity Management: activity adjusted per tolerance  Environmental Support: calm environment promoted  Taken 11/6/2024 0902 by Brett Ford RN  Activity Management: activity adjusted per tolerance  Environmental Support: calm environment promoted  Taken 11/6/2024 0800 by Brett Ford RN  Activity Management: activity adjusted per tolerance   Goal Outcome Evaluation:      Plan of Care Reviewed With: patient, other (see comments)    Overall Patient Progress: no changeOverall Patient Progress: no change    Outcome Evaluation: previous outcome note deleted----up with therapy, paracentsis done at bedside

## 2024-11-06 NOTE — PROGRESS NOTES
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      # New Ascites complicated by SBP   # RENNY (baseline Cr 1)  Patient with new onset ascites; diagnostic fluid studies consistent with portal hypertensive ascites 2/2 cirrhosis and cell ct c.w. SBP. Ceftriaxone 2 g daily started 10/30/24, has received albumin and holding diuretics. Cr on admit 1.69, now 1.85    #. Decompensated Cirrhosis   Primary Hepatologist: Dr. Renteria   Etiology: HCV/MASLD   Ascites: + clinically.   - hold PTA 2mg Bumex and 50mg spironolactone  - SBP history: + hx (dx 10/30/24) , repeat para also positive 11/2, switched abx to Zosyn  Hepatic encephalopathy: PTA lactulose and rifaximin  EV: EGD 5/2023 without varices   TIPS: n/a   PV: Patent on US 8/19/24   HCC: CT A/P 10/18/24 without concerning lesions     MELD 3.0: 11 at 8/21/2024  6:34 AM  MELD-Na: 10 at 8/21/2024  6:34 AM  Calculated from:  Serum Creatinine: 1.04 mg/dL at 8/21/2024  6:34 AM  Serum Sodium: 140 mmol/L (Using max of 137 mmol/L) at 8/21/2024  6:34 AM  Total Bilirubin: 1.1 mg/dL at 8/20/2024  6:06 AM  Serum Albumin: 2.7 g/dL at 8/20/2024  6:06 AM  INR(ratio): 1.32 at 8/19/2024  5:14 PM  Age at listing (hypothetical): 74 years  Sex: Male at 8/21/2024  6:34 AM      RECOMMENDATIONS:  - Cont Zosyn   - Hold diuretics for now   - Diagnostic and therapeutic para today w/ no more than 3L removed, give w/ 50g albumin    Thank you for involving us in this  patient's care. Please do not hesitate to contact the GI service with any questions or concerns.     Patient was d/w the attending, Dr. Girish Severino  GI Fellow    _______________________________________________________________      Subjective: Nursing notes and 24hr events reviewed.     No complaints, no pain    ROS:   4 pt ROS negative unless noted in subjective.     Objective:  Blood pressure 110/49, pulse 66, temperature 97.7  F (36.5  C), temperature source Oral, resp. rate 17, weight 107.2 kg (236 lb 5.3 oz), SpO2 94%.    General: male sitting up in chair. Appears comfortable.  Answers appropriately.    Lungs: on RA, comfortable  Abdomen: distended  Extremities: no edema, erythematous rash bl anterior legs  Musculoskeletal: No gross deformity.  Skin: No jaundice or rash on exposed skin.  Mental status/Psych: A&O. Asks/answers questions appropriately. Pleasant, interactive.    Date 10/31/24 0700 - 11/01/24 0659   Shift 2360-2439 3984-7428 9969-5140 24 Hour Total   INTAKE   I.V. 36.13   36.13   Shift Total(mL/kg) 36.13(0.31)   36.13(0.31)   OUTPUT   Urine 725   725   Shift Total(mL/kg) 725(6.14)   725(6.14)   Weight (kg) 118.07 118.07 118.07 118.07         LABS:  [facility]  Recent Labs   Lab 11/06/24  0749 11/06/24  0512 11/05/24  2138 11/05/24  1732 11/05/24  0755 11/05/24  0426 11/04/24  0745 11/04/24  0438 11/03/24  0750 11/03/24  0428   NA  --  141  --   --   --  142  --  140  --  143   POTASSIUM  --  3.5  --   --   --  3.8  --  4.1  --  3.9   CHLORIDE  --  105  --   --   --  104  --  104  --  105   SOLEDAD  --  7.8*  --   --   --  7.6*  --  7.7*  --  7.7*   CO2  --  28  --   --   --  28  --  28  --  28   BUN  --  51.6*  --   --   --  51.2*  --  51.9*  --  44.8*   CR  --  1.63*  --   --   --  1.85*  --  1.72*  --  1.56*   * 106* 134* 169*   < > 103*   < > 107*   < > 104*    < > = values in this interval not displayed.     CBC  Recent Labs   Lab 11/06/24  0512 11/05/24  0426 11/04/24  0438  11/03/24 0428   WBC 7.9 9.6 11.3* 11.4*   RBC 2.63* 2.69* 2.55* 2.50*   HGB 8.6* 8.7* 8.4* 8.0*   HCT 27.0* 27.6* 26.4* 25.7*   * 103* 104* 103*   MCH 32.7 32.3 32.9 32.0   MCHC 31.9 31.5 31.8 31.1*   RDW 21.3* 21.5* 21.6* 22.1*   PLT 39* 45* 47* 47*     INRNo lab results found in last 7 days.  LFTs  Recent Labs   Lab 11/06/24  0512 11/05/24 0426 11/04/24  0438 11/03/24 0428   ALKPHOS 137 125 123 120   AST 29 26 25 24   ALT 13 12 11 11   BILITOTAL 0.8 0.8 0.7 0.7   PROTTOTAL 4.4* 4.6* 4.3* 4.5*   ALBUMIN 2.6* 2.7* 2.6* 2.8*      PANCNo lab results found in last 7 days.

## 2024-11-07 ENCOUNTER — APPOINTMENT (OUTPATIENT)
Dept: OCCUPATIONAL THERAPY | Facility: CLINIC | Age: 74
End: 2024-11-07
Attending: INTERNAL MEDICINE
Payer: COMMERCIAL

## 2024-11-07 ENCOUNTER — APPOINTMENT (OUTPATIENT)
Dept: OCCUPATIONAL THERAPY | Facility: CLINIC | Age: 74
DRG: 291 | End: 2024-11-07
Attending: INTERNAL MEDICINE
Payer: COMMERCIAL

## 2024-11-07 ENCOUNTER — APPOINTMENT (OUTPATIENT)
Dept: PHYSICAL THERAPY | Facility: CLINIC | Age: 74
End: 2024-11-07
Attending: INTERNAL MEDICINE
Payer: COMMERCIAL

## 2024-11-07 LAB
ALBUMIN SERPL BCG-MCNC: 2.6 G/DL (ref 3.5–5.2)
ALP SERPL-CCNC: 141 U/L (ref 40–150)
ALT SERPL W P-5'-P-CCNC: 16 U/L (ref 0–70)
ANION GAP SERPL CALCULATED.3IONS-SCNC: 8 MMOL/L (ref 7–15)
AST SERPL W P-5'-P-CCNC: 35 U/L (ref 0–45)
BACTERIA FLD CULT: NO GROWTH
BILIRUB DIRECT SERPL-MCNC: 0.23 MG/DL (ref 0–0.3)
BILIRUB SERPL-MCNC: 0.7 MG/DL
BUN SERPL-MCNC: 47.3 MG/DL (ref 8–23)
CALCIUM SERPL-MCNC: 7.6 MG/DL (ref 8.8–10.4)
CHLORIDE SERPL-SCNC: 103 MMOL/L (ref 98–107)
CREAT SERPL-MCNC: 1.51 MG/DL (ref 0.67–1.17)
EGFRCR SERPLBLD CKD-EPI 2021: 48 ML/MIN/1.73M2
ERYTHROCYTE [DISTWIDTH] IN BLOOD BY AUTOMATED COUNT: 20.9 % (ref 10–15)
GLUCOSE BLDC GLUCOMTR-MCNC: 122 MG/DL (ref 70–99)
GLUCOSE BLDC GLUCOMTR-MCNC: 142 MG/DL (ref 70–99)
GLUCOSE BLDC GLUCOMTR-MCNC: 153 MG/DL (ref 70–99)
GLUCOSE BLDC GLUCOMTR-MCNC: 195 MG/DL (ref 70–99)
GLUCOSE SERPL-MCNC: 112 MG/DL (ref 70–99)
GRAM STAIN RESULT: NORMAL
GRAM STAIN RESULT: NORMAL
HCO3 SERPL-SCNC: 27 MMOL/L (ref 22–29)
HCT VFR BLD AUTO: 25.1 % (ref 40–53)
HGB BLD-MCNC: 7.8 G/DL (ref 13.3–17.7)
HGB BLD-MCNC: 8.6 G/DL (ref 13.3–17.7)
MAGNESIUM SERPL-MCNC: 2.2 MG/DL (ref 1.7–2.3)
MCH RBC QN AUTO: 32.4 PG (ref 26.5–33)
MCHC RBC AUTO-ENTMCNC: 31.1 G/DL (ref 31.5–36.5)
MCV RBC AUTO: 104 FL (ref 78–100)
PLATELET # BLD AUTO: 45 10E3/UL (ref 150–450)
POTASSIUM SERPL-SCNC: 3.5 MMOL/L (ref 3.4–5.3)
PROT SERPL-MCNC: 4.5 G/DL (ref 6.4–8.3)
RBC # BLD AUTO: 2.41 10E6/UL (ref 4.4–5.9)
SODIUM SERPL-SCNC: 138 MMOL/L (ref 135–145)
WBC # BLD AUTO: 6.9 10E3/UL (ref 4–11)

## 2024-11-07 PROCEDURE — 85014 HEMATOCRIT: CPT | Performed by: NURSE PRACTITIONER

## 2024-11-07 PROCEDURE — 97535 SELF CARE MNGMENT TRAINING: CPT | Mod: GO

## 2024-11-07 PROCEDURE — 120N000005 HC R&B MS OVERFLOW UMMC

## 2024-11-07 PROCEDURE — G0463 HOSPITAL OUTPT CLINIC VISIT: HCPCS

## 2024-11-07 PROCEDURE — 250N000013 HC RX MED GY IP 250 OP 250 PS 637: Performed by: INTERNAL MEDICINE

## 2024-11-07 PROCEDURE — 82248 BILIRUBIN DIRECT: CPT | Performed by: NURSE PRACTITIONER

## 2024-11-07 PROCEDURE — 99223 1ST HOSP IP/OBS HIGH 75: CPT | Performed by: INTERNAL MEDICINE

## 2024-11-07 PROCEDURE — 99233 SBSQ HOSP IP/OBS HIGH 50: CPT | Performed by: PEDIATRICS

## 2024-11-07 PROCEDURE — 80053 COMPREHEN METABOLIC PANEL: CPT | Performed by: STUDENT IN AN ORGANIZED HEALTH CARE EDUCATION/TRAINING PROGRAM

## 2024-11-07 PROCEDURE — 97116 GAIT TRAINING THERAPY: CPT | Mod: GP

## 2024-11-07 PROCEDURE — 250N000011 HC RX IP 250 OP 636: Performed by: NURSE PRACTITIONER

## 2024-11-07 PROCEDURE — 83735 ASSAY OF MAGNESIUM: CPT | Performed by: PEDIATRICS

## 2024-11-07 PROCEDURE — 99232 SBSQ HOSP IP/OBS MODERATE 35: CPT | Mod: GC | Performed by: INTERNAL MEDICINE

## 2024-11-07 PROCEDURE — 85041 AUTOMATED RBC COUNT: CPT | Performed by: NURSE PRACTITIONER

## 2024-11-07 PROCEDURE — 250N000013 HC RX MED GY IP 250 OP 250 PS 637: Performed by: STUDENT IN AN ORGANIZED HEALTH CARE EDUCATION/TRAINING PROGRAM

## 2024-11-07 PROCEDURE — 85018 HEMOGLOBIN: CPT | Performed by: PEDIATRICS

## 2024-11-07 PROCEDURE — 250N000013 HC RX MED GY IP 250 OP 250 PS 637: Performed by: NURSE PRACTITIONER

## 2024-11-07 PROCEDURE — 97530 THERAPEUTIC ACTIVITIES: CPT | Mod: GP

## 2024-11-07 PROCEDURE — 250N000011 HC RX IP 250 OP 636: Performed by: STUDENT IN AN ORGANIZED HEALTH CARE EDUCATION/TRAINING PROGRAM

## 2024-11-07 PROCEDURE — 250N000012 HC RX MED GY IP 250 OP 636 PS 637: Performed by: NURSE PRACTITIONER

## 2024-11-07 RX ORDER — POTASSIUM CHLORIDE 750 MG/1
20 TABLET, EXTENDED RELEASE ORAL ONCE
Status: COMPLETED | OUTPATIENT
Start: 2024-11-07 | End: 2024-11-07

## 2024-11-07 RX ADMIN — PREDNISONE 5 MG: 5 TABLET ORAL at 08:34

## 2024-11-07 RX ADMIN — POTASSIUM CHLORIDE 20 MEQ: 750 TABLET, EXTENDED RELEASE ORAL at 08:33

## 2024-11-07 RX ADMIN — ASPIRIN 81 MG CHEWABLE TABLET 81 MG: 81 TABLET CHEWABLE at 08:34

## 2024-11-07 RX ADMIN — Medication 950 MG: at 08:34

## 2024-11-07 RX ADMIN — PIPERACILLIN SODIUM AND TAZOBACTAM SODIUM 3.38 G: 3; .375 INJECTION, SOLUTION INTRAVENOUS at 12:32

## 2024-11-07 RX ADMIN — PIPERACILLIN SODIUM AND TAZOBACTAM SODIUM 3.38 G: 3; .375 INJECTION, SOLUTION INTRAVENOUS at 23:40

## 2024-11-07 RX ADMIN — METOPROLOL SUCCINATE 125 MG: 25 TABLET, EXTENDED RELEASE ORAL at 08:34

## 2024-11-07 RX ADMIN — HEPARIN, PORCINE (PF) 10 UNIT/ML INTRAVENOUS SYRINGE 5 ML: at 06:35

## 2024-11-07 RX ADMIN — PIPERACILLIN SODIUM AND TAZOBACTAM SODIUM 3.38 G: 3; .375 INJECTION, SOLUTION INTRAVENOUS at 06:14

## 2024-11-07 RX ADMIN — ACETAMINOPHEN 650 MG: 325 TABLET, FILM COATED ORAL at 20:07

## 2024-11-07 RX ADMIN — INSULIN ASPART 1 UNITS: 100 INJECTION, SOLUTION INTRAVENOUS; SUBCUTANEOUS at 17:51

## 2024-11-07 RX ADMIN — PIPERACILLIN SODIUM AND TAZOBACTAM SODIUM 3.38 G: 3; .375 INJECTION, SOLUTION INTRAVENOUS at 17:43

## 2024-11-07 RX ADMIN — OXYCODONE HYDROCHLORIDE 5 MG: 5 TABLET ORAL at 20:07

## 2024-11-07 RX ADMIN — INSULIN ASPART 2 UNITS: 100 INJECTION, SOLUTION INTRAVENOUS; SUBCUTANEOUS at 12:35

## 2024-11-07 RX ADMIN — PANTOPRAZOLE SODIUM 40 MG: 40 TABLET, DELAYED RELEASE ORAL at 08:34

## 2024-11-07 NOTE — PROGRESS NOTES
[facility] Nurse Inpatient Assessment     Consulted for: Wounds posterior thigh and buttocks      Patient History (according to provider note(s):      Gucci Logan is a 74 year old male with a history of HCM, metastatic prostate cancer, cirrhosis d/t chronic HCV infection who presents for heart failure exacerbation     Assessment:      Areas visualized during today's visit: Focused:, Perineal area, Sacrum/coccyx, Lower extremities , Right, and Left    Wound location: Fleshy buttocks               Last photo: 11/7  Wound due to: Friction and edema  Wound history/plan of care: Pt has been seen in clinic for weeping sores on his buttocks and upper thigh ever since he fell and fractured his left hip earlier this year. Pt was subsequently diagnosed with metastatic prostate cancer after he received imaging for his hip fracture. Pt also suffers from heart failure resulting in significant edema in the lower extremities and buttocks. On assessment three open wounds noted to Pt's buttocks. The wounds are not over a bony prominence. Each has an irregular border. The larger sore on the left buttock appears to have been a blister that unroofed as there is some loose epidermis at the edges of the wound base. The left thigh has all in tact epidermis with blanchable erythema. Palpable edema present in all areas.  11/7: There is a newly formed raised nodule on the left buttock. On assessment it has the appearance of a macerated mole. Approximately 0.5 x 0.5 cm. MD made aware of findings.  Wound base: 70 % Dermis, 30% fibrin     Palpation of the wound bed: normal      Drainage: small     Description of drainage: serous     Measurements (length x width x depth, in cm): 1  x 1.3  x  0.1 cm right buttock, 2 areas  on left buttock 2 x 2 0.1 and 0.5 x 0.5 x 0.1     Tunneling: N/A     Undermining: N/A  Periwound skin: Intact and Edematous      Color: normal and consistent with  "surrounding tissue and pink      Temperature: normal   Odor: none  Pain: denies , none  Pain interventions prior to dressing change: slow and gentle cares   Treatment goal: Heal , Drainage control, Maintain (prevention of deterioration), and Protection  STATUS: healing  Supplies ordered: at bedside and discussed with patient       Treatment Plan:     Buttock wound(s): Every 3 days   Cleanse the area with NS and pat dry.  Apply No sting film barrier to periwound skin.  Cover wound with Sacral Mepilex (#753603)  Change dressing Q 3 days.  Turn and reposition Q 2hrs side to side only.  Ensure pt has Sean-cushion while sitting up in the chair.  FYI- If pt has constant incontinent loose stools needing dressing changes Q shift please discontinue the Mepilex dressing and apply criticaid barrier paste BID and PRN.      Pressure Injury Prevention (PIP) Plan:  If patient is declining pressure injury prevention interventions: Explore reason why and address patient's concerns, Educate on pressure injury risk and prevention intervention(s), If patient is still declining, document \"informed refusal\" , and Ensure Care team is aware ( provider, charge nurse, etc)  Mattress: Follow bed algorithm, add Low Air Loss (Air+) mattress pump if skin is very moist or constantly moist.   HOB: Maintain at or below 30 degrees, unless contraindicated  Repositioning in bed: Every 1-2 hours , Left/right positioning; avoid supine, Raise foot of bed prior to raising head of bed, to reduce patient sliding down (shear), and Frequent microturns using positioning wedges, as patient tolerates  Heels: Pillows under calves  Protective Dressing: Sacral Mepilex for prevention (#482722),  especially for the agitated patient   Positioning Equipment:None  Chair positioning: Chair cushion (#058038) , Assist patient to reposition hourly, and Do NOT use a donut for sitting (this increases pressure to smaller area and creates a higher potential for injury)    If " patient has a buttock pressure injury, or high risk for PI use chair cushion or SPS.  Moisture Management: Perineal cleansing /protection: Follow Incontinence Protocol, Avoid brief in bed, Clean and dry skin folds with bathing , Consider InterDry (#455697) between folds, and Moisturize dry skin  Under Devices: Inspect skin under all medical devices during skin inspection , Ensure tubes are stabilized without tension, and Ensure patient is not lying on medical devices or equipment when repositioned  Ask provider to discontinue device when no longer needed.     Orders: Written    RECOMMEND PRIMARY TEAM ORDER: Dermatology consult  Education provided: importance of repositioning, plan of care, wound progress, Moisture management, and Off-loading pressure  Discussed plan of care with: Patient  WO nurse follow-up plan: weekly  Notify WOC if wound(s) deteriorate.  Nursing to notify the Provider(s) and re-consult the WOC Nurse if new skin concern.    DATA:     Current support surface: Standard  Standard gel mattress (Isoflex) - ordered iosoflex pump for Pt's bed.  Containment of urine/stool: Incontinent pad in bed  BMI: Body mass index is 33.91 kg/m .   Active diet order: Orders Placed This Encounter      Regular Diet Adult     Output: I/O last 3 completed shifts:  In: 700 [P.O.:600; I.V.:100]  Out: 1125 [Urine:1125]     Labs:   Recent Labs   Lab 11/07/24  0433   ALBUMIN 2.6*   HGB 7.8*   WBC 6.9     Pressure injury risk assessment:   Sensory Perception: 3-->slightly limited  Moisture: 3-->occasionally moist  Activity: 3-->walks occasionally  Mobility: 3-->slightly limited  Nutrition: 2-->probably inadequate  Friction and Shear: 2-->potential problem  Mich Score: 16    Scooter Santos, RN, COCN, CCCN  WO RN Treatment Specialist  Pager no longer is use, please contact through [employer] group: [facility] Nurse Micro    Dept. Office Number: [phone]

## 2024-11-07 NOTE — CONSULTS
Using this note to clear CM/SW consult from 10/30. Please see [facility] notes this admissions for details on discharge planning.    UPDATE--- SW received a VM at 1:04pm from admissions at [facility] telling the SW that due to the high cost of pt's chemo meds, they wouldn't be able to take him at the TCU.    ___________________    Avtar Stallings, IVANIA, LGSW  6C , covering beds 1801 to 6577  [facility]   Phone: [phone]  6C Cards [facility] [place]

## 2024-11-07 NOTE — PROGRESS NOTES
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      # New Ascites complicated by SBP   # RENNY (baseline Cr 1)  Patient with new onset ascites; diagnostic fluid studies consistent with portal hypertensive ascites 2/2 cirrhosis and cell ct c.w. SBP. Ceftriaxone 2 g daily started 10/30/24, repeat para also positive 11/2, switched abx to Zosyn; repeat para 11/6 was improved (580->558->452); Cr on admit 1.69, now 1.5     #. Decompensated Cirrhosis   Primary Hepatologist: Dr. Renteria   Etiology: HCV/MASLD   Ascites: + clinically.   - hold 50mg spironolactone, resume 2mg Bumex   - SBP history: + hx (dx 10/30/24) , repeat para also positive 11/2, switched abx to Zosyn; repeat para 11/6 was improved (580->558->452)   Hepatic encephalopathy: PTA lactulose and rifaximin  EV: EGD 5/2023 without varices   TIPS: n/a   PV: Patent on US 8/19/24   HCC: CT A/P 10/18/24 without concerning lesions      MELD 3.0: 11 at 8/21/2024  6:34 AM  MELD-Na: 10 at 8/21/2024  6:34 AM  Calculated from:  Serum Creatinine: 1.04 mg/dL at 8/21/2024  6:34 AM  Serum Sodium: 140 mmol/L (Using max of 137 mmol/L) at 8/21/2024  6:34 AM  Total Bilirubin: 1.1 mg/dL at 8/20/2024  6:06 AM  Serum Albumin: 2.7 g/dL at 8/20/2024  6:06 AM  INR(ratio): 1.32 at 8/19/2024  5:14 PM  Age at listing (hypothetical): 74 years  Sex: Male at 8/21/2024  6:34 AM      RECOMMENDATIONS:  - Agree w/ ID consult   - Okay to resume Bumex 2mg every day     Thank you for involving us in this patient's care. Please do not hesitate to contact the GI service with any questions or concerns.      Pt care plan discussed with  Dr. Keating, GI staff physician.    Amie Severino   Gastroenterology Fellow  _______________________________________________________________    Subjective: patient reports that he became acutely SOB yesterday when walking, unclear etiology; having loose BMs, nonbloody       Objective:  Blood pressure 116/46, pulse 65, temperature 98.2  F (36.8  C), temperature source Oral, resp. rate 19, weight 107.2 kg (236 lb 4.8 oz), SpO2 94%.    General: male sitting up in chair. Appears comfortable.    Lungs: on RA, comfortable  Abdomen: soft, distended, NT  Extremities: no edema, erythematous rash bl anterior legs  Musculoskeletal: No gross deformity.  Skin: No jaundice   Mental status/Psych: A&O. Asks/answers questions appropriately. Pleasant, interactive     LABS:  BMP  Recent Labs   Lab 11/07/24  0738 11/07/24  0433 11/06/24  2222 11/06/24  1707 11/06/24  0749 11/06/24  0512 11/05/24  0755 11/05/24  0426 11/04/24  0745 11/04/24  0438   NA  --  138  --   --   --  141  --  142  --  140   POTASSIUM  --  3.5  --   --   --  3.5  --  3.8  --  4.1   CHLORIDE  --  103  --   --   --  105  --  104  --  104   SOLEDAD  --  7.6*  --   --   --  7.8*  --  7.6*  --  7.7*   CO2  --  27  --   --   --  28  --  28  --  28   BUN  --  47.3*  --   --   --  51.6*  --  51.2*  --  51.9*   CR  --  1.51*  --   --   --  1.63*  --  1.85*  --  1.72*   * 112* 170* 181*   < > 106*   < > 103*   < > 107*    < > = values in this interval not displayed.     CBC  Recent Labs   Lab 11/07/24  0433 11/06/24  0512 11/05/24 0426 11/04/24 0438   WBC 6.9 7.9 9.6 11.3*   RBC 2.41* 2.63* 2.69* 2.55*   HGB 7.8* 8.6* 8.7* 8.4*   HCT 25.1* 27.0* 27.6* 26.4*   * 103* 103* 104*   MCH 32.4 32.7 32.3 32.9   MCHC 31.1* 31.9 31.5 31.8   RDW 20.9* 21.3* 21.5* 21.6*   PLT 45* 39* 45* 47*     INRNo lab results found in last 7 days.  LFTs  Recent Labs   Lab 11/07/24 0433 11/06/24 0512 11/05/24 0426 11/04/24 0438   ALKPHOS 141 137 125 123   AST 35 29 26 25   ALT 16 13 12  11   BILITOTAL 0.7 0.8 0.8 0.7   PROTTOTAL 4.5* 4.4* 4.6* 4.3*   ALBUMIN 2.6* 2.6* 2.7* 2.6*      PANCNo lab results found in last 7 days.

## 2024-11-07 NOTE — PROGRESS NOTES
CLINICAL NUTRITION SERVICES - REASSESSMENT NOTE     Nutrition Prescription    RECOMMENDATIONS FOR MDs/PROVIDERS TO ORDER:  Order a multivitamin with minerals to help meet micronutrient needs.    Malnutrition Status:    Patient does not meet two of the established criteria necessary for diagnosing malnutrition but is at risk for malnutrition    Recommendations already ordered by Registered Dietitian (RD):  Ordered snacks    Future/Additional Recommendations:  -Rec continue current diet, as ordered (liberalized to encourage oral intake). Encourage pt to self-select tolerated foods/beverages. Rec small, frequent meals and use of oral supplements or snacks. Encourage a variety of protein sources.    -If warranted, monitor need for fluid restriction and sodium restriction.   -Monitor stooling patterns and potential need for bowel regimen.   -H/o vitamin D deficiency. Check a vitamin D lab with a CRP. Vitamin D total lab was 71 on 2/20/2024. Monitor need for supplementation.  -Monitor BG control. Hgb A1c of 5.8 (10/29/24). BG was 112 on 11/7.  -Monitor need for iron supplementation, if warranted.     EVALUATION OF THE PROGRESS TOWARD GOALS   Diet: Regular since 11/4. Has a prn snack/supplement order. Has a surplus of oral supplements in room (Ensure Enlive, Ensure Max, and Ensure Clear).   Intake/Tolerance: Good diet tolerance. Per nursing flowsheets (% intake), pt is consistently consuming 100% of meals with the exception of 75% once on 11/3 and 50% once on 10/31. Pt is ordering three meals daily on average. Food preferences due to LOS may affect oral intake. Pt mentioned he has eaten most things on the menu, wishes coleslaw was offered on the menu and feels tater tots would be better if crispy.     Nutrition Support: None so far this admission     NEW FINDINGS   Nutrition hx: Per discussion with pt, he was eating less than normal amounts PTA. States he had a decreased appetite. Admits he felt swollen, occasional nausea.  "  Neuro: Forgetful at times  WOC (10/30): \"Wound location: Fleshy buttocks. Wound due to: Friction and edema. STATUS: initial assessment.\"  GI: Having zero to two stool/s daily on average. Last Bowel Movement: 11/07/24. Stools are loose/soft and brown.  Weight History: 111.8 kg (10/10/2023), 115 kg (4/30/2024), 104.8 kg (7/18/2024), 104.8 kg (9/24/2024), 121.3 kg (10/29, admit), 107.2 kg (11/6) -  Per provider H & P, hypervolemic on admit. Diuresis and paracenteses x3 this admission. Pt has lost 6.8% of body wt over the last approximate six months- suspect fluid status changes and possibly some actual wt loss      ASSESSED NUTRITION NEEDS (for inpatient hospital stay)  Dosing Weight: 83 kg (adjusted, based on lowest wt so far this admission of 107.2 kg on 11/6)  Estimated Energy Needs: 0326-6794 kcals/day (20 - 25 kcals/kg)  Justification: Estimated needs with BMI.  Estimated Protein Needs:  grams protein/day (1 - 1.5 grams of pro/kg)  Justification: Increased needs, possibly chemo, and pending renal function.   Estimated Fluid Needs: 6900-3768 mL/day (1 mL/kcal)   Justification: Estimated needs or per provider pending fluid status    MALNUTRITION  % Intake: No decreased intake noted  % Weight Loss: Weight loss does not meet criteria. Difficult to assess wt changes with changes in fluid status, diuresis  Subcutaneous Fat Loss: None observed but difficult to assess with wt status  Muscle Loss: None observed but difficult to assess with wt status  Fluid Accumulation/Edema: Mild  Malnutrition Diagnosis: Patient does not meet two of the established criteria necessary for diagnosing malnutrition but is at risk for malnutrition    Previous Goals   Patient to consume % of nutritionally adequate meal trays TID, or the equivalent with supplements/snacks.  EvaluationMeeting on average.     Previous Nutrition Diagnosis  Predicted inadequate nutrient intake (protein-energy) related to increased protein " "needs.  Evaluation: Unresolved, updated below.    CURRENT NUTRITION DIAGNOSIS  Predicted inadequate nutrient intake (protein-energy) related to increased protein needs and food preferences with LOS.    INTERVENTIONS  Implementation  -Nutrition education: Encouraged adequate nutrition intake. Discussed diet order. Rec avoid excessive sodium intake. Encouraged intake of prn oral supplements/snacks, as able.   -Discussed oral supplements with pt. Ordered Special K bar, Peanut butter chocolate @ 2, strawberry @ HS. Provided another copy of the oral supplement menu.   -Ordered pt's lunch.    Goals  Patient to consume % of nutritionally adequate meal trays TID, or the equivalent with supplements/snacks.    Monitoring/Evaluation  Progress toward goals will be monitored and evaluated per protocol.     Aylin Quezada, MS, RD, LD, CNSC      No longer available via pager  6C (beds [id number] and [phone]): Vocera \"6C Clinical Dietitian\"   Weekend/Holiday: Vocera \"Weekend Clinical Dietitian\"   "

## 2024-11-07 NOTE — CONSULTS
[facility] - GENERAL INFECTIOUS DISEASES CONSULTATION     Patient:  Gucci Logan   Date of birth 1950, Medical record number [id number]  Date of Visit:  11/07/2024  Date of Admission: 10/29/2024  Consult Requested by:Fracisco Wilkins DO  Reason for Consult: SBP treatment recs, failed to respond to rocephin, slow to respond to zosyn, no bug          Assessment and Recommendations:     Gucci Logan is a 74 year old male with a history of HCM, HTN, metastatic prostate cancer, cirrhosis due to  chronic HCV infection s/p treatment, portal hypertension, ascites, CKD 3 , Diabetes mellitus type 2 ( HbA1c 5.8 on 10/29/24), chronic thrombocytopenia,  chronic anemia, high subtrochanteric fracture L proximal femur s/p open reduction, cerclage fixation fracture along with cephalomedullary Internal fixation with lag screw and interlocking screw on 5/20/24,  admitted on 10/29/24  for heart failure exacerbation, worsening fluid retention/ edema, ascites and bilateral lower abdominal pain.    ASSESSMENT:  Anasarca  Culture negative neutrocytic ascites ( infection vs malignancy)   Abdominal pain - lateral lower abdominal wall edema with mild cellulitis ( left lower abdomen)  HCM   Metastatic prostate cancer   liver cirrhosis   History of Hepatitis C s/p treatment , undetectable viral load in April 2024  Anemia  Thrombocytopenia  Diabetes mellitus ( HbA1c 5.8 on 10/29/24)  history of constipation   Leukocytosis - resolved  skin lesions ( sacral area/ legs)     RECOMMENDATION:  - will be completing 5 days of Zosyn today. will need SBP prophylaxis with daily cipro to prevent future SBP (to be dosed per renal function)  - monitor abdominal wall cellulitis, likely causing his abdominal pain. otherwise no tenderness in other parts of his abdomen. Consider Vancomycin IV if cellulitis worsens   - agree with diuresis   - MRSA screen     ID will continue to follow.     Thank you for allowing us to participate in the  care of this patient    Brandon Templeton MD, M.Med.Sc  Staff, Infectious Diseases     80 Minutes spent by me on the date of service doing chart review, history, exam, documentation, coordination of care and further activities per the note             History of Present Illness:     Gucci Logan is a 74 year old male with a history of HCM, HTN, metastatic prostate cancer, cirrhosis due to  chronic HCV infection s/p treatment, portal hypertension, ascites, CKD 3 , Diabetes mellitus type 2 ( HbA1c 5.8 on 10/29/24), chroic thrombocytopenia,  chronic anemia, high subtrochanteric fracture L proximal femur s/p open reduction, cerclage fixation fracture along with cephalomedullary Internal fixation with lag screw and interlocking screw on 5/20/24,  admitted on 10/29/24  for heart failure exacerbation, worsening fluid retention/ edema, ascites and bilateral lower abdominal pain. no fever, chills.      He had paracentesis on 10/30/24 with 579 absolute neutrophils (79% Neutrophils) Culture - no growth . ceftriaxone was started   repeat paracentesis on 11/2/24 showed absolute nettrophils 558 (80% neutrophils) with negative culture. ceftriaxone was discontinued and Pip/Tazobactam was started   paracentesis on 11/6/24 with 452 absolute neutrophils (63% neutrophils) - culture is negative to date     Peripheral WBC has normalized. He has been afebrile. tolerates oral intake, has nausea but no vomiting    Antimicrobials:   Pip/Tazobactam 11/3- present   Ceftriaxone 10/30-11/2    Imaging:  CT chest/abdomen/pelvis w contrast 10/18/24  IMPRESSION:  In this patient with metastatic prostatic disease on treatment here for restaging/assessment of treatment response:     1.  Findings concerning for worsening metastatic disease with multiple new foci of radiotracer uptake within multiple pelvic lymph nodes and osseous foci, as above.  2.  Persistent increased radiotracer uptake within multiple established metastatic pelvic,  retroperitoneal (and likely subcarinal), adenopathy.  3.  Persistent increased radiotracer uptake within multiple osseous lesions seen on previous examination, as above.  4.  Cirrhotic liver morphology with sequela of portal venous hypertension, with increasing small to moderate volume diffuse  abdominopelvic ascites.   5.  Slight interval increase in splenomegaly, with overall increased background avidity of the spleen. Etiology of increasing avidity and  size may be due to recent administration of GCSF therapy. Correlate clinically.  6.  Again noted are multiple bilateral hypodense thyroid nodules, which can be further characterized with thyroid ultrasound when  clinically appropriate.  7.  Incidental CT findings, as above         Review of Systems:   CONSTITUTIONAL:  No fevers or chills  EYES: negative for icterus  ENT:  negative for hearing loss, tinnitus and sore throat  RESPIRATORY:  negative for cough with sputum and dyspnea  CARDIOVASCULAR: see HPI  GASTROINTESTINAL:  see HPI   GENITOURINARY:  negative for dysuria  HEME:  No easy bruising  INTEGUMENT:  see HPI  NEURO:  Negative for headache         Past Medical History:     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    Diverticular disease of colon 07/14/2015    Esophageal reflux 03/01/2014    Gastroesophageal reflux disease, esophagitis presence not specified 10/06/2016    IMO Regulatory Load OCT 2020      Glaucoma suspect of both eyes 04/30/2024    Hearing problem     Hiatal  hernia     Hyperlipidemia LDL goal <100 04/19/2017    Hypertension     Hypertension goal BP (blood pressure) < 140/90 02/08/2013    Intertrochanteric fracture of left femur, closed, initial encounter (H) 05/19/2024    Jaundice 05/31/2008    Liver disease     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 Not keen to use it.     Stage 3 chronic kidney disease, unspecified whether stage 3a or 3b CKD (H) 01/23/2024    Syncope, unspecified syncope type 02/20/2023    Thrombocytopenia (H) 08/28/2008    Overview:   8/28/2008, attributed to liver disease with portal hypertension and functional splenomegaly            Past Surgical History:     Past Surgical History:   Procedure Laterality Date    ARTHROSCOPY KNEE RT/LT      (L) with partial medial meniscectomy    CATARACT IOL, RT/LT  Nov and Dec 2017    COLONOSCOPY N/A 4/24/2019    Procedure: COLONOSCOPY, WITH POLYPECTOMY AND BIOPSY;  Surgeon: Leventhal, Thomas Michael, MD;  Location: [facility]    COLONOSCOPY N/A 9/14/2022    Procedure: COLONOSCOPY;  Surgeon: Rafa Renee MD;  Location:  GI    DACRYOCYSTORHINOSTOMY Left 10/16/2018    Procedure: DACRYOCYSTORHINOSTOMY;  Surgeon: Madhu Krause MD;  Location: [facility]    ENDOSCOPIC ENDONASAL SURGERY  1994    ENDOSCOPY  2-19-15    ESOPHAGOSCOPY, GASTROSCOPY, DUODENOSCOPY (EGD), COMBINED N/A 4/24/2019    Procedure: COMBINED ESOPHAGOSCOPY, GASTROSCOPY, DUODENOSCOPY (EGD) - hold aspirin ibuprofen or naproxen for one week prior (per physician order);   Surgeon: Leventhal, Thomas Michael, MD;  Location: [facility] OR    ESOPHAGOSCOPY, GASTROSCOPY, DUODENOSCOPY (EGD), COMBINED N/A 2023    Procedure: Esophagoscopy, gastroscopy, duodenoscopy, combined;  Surgeon: Rafa Renee MD;  Location: [facility]    EYE SURGERY      Hernia surgery Left     NASAL/SINUS POLYPECTOMY      OPEN REDUCTION INTERNAL FIXATION HIP NAILING Left 2024    Procedure: open reduction internal fixation hip nailing left;  Surgeon: Rafa Wagner MD;  Location: [facility] OR    REPAIR PTOSIS Left 10/16/2018    Procedure: LEFT UPPER LID PTOSIS AND BILATERAL  BROW PTOSIS REPAIR WITH LEFT DACRYOCYSTORHINOSTOMY ;  Surgeon: Madhu Krause MD;  Location: [facility]    REPAIR PTOSIS BROW Bilateral 10/16/2018    Procedure: REPAIR PTOSIS BROW;  Surgeon: Madhu Krause MD;  Location: [facility]    ROTATOR CUFF REPAIR RT/LT Right     ZZC OPEN RX ANKLE DISLOCATN+FIXATN      (R)    ZZC SPINAL FUSION,ANT,EA ADNL LEVEL      T12 - L1          Family History:     Family History   Problem Relation Age of Onset    Alzheimer Disease Mother 85    Dementia Mother     Cerebrovascular Disease Father 50    Cancer Father     Hypertension Father     Neurologic Disorder No family hx of     Diabetes No family hx of           Social History:     Social History     Tobacco Use    Smoking status: Former     Current packs/day: 0.00     Types: Cigarettes     Start date: 1990     Quit date: 1999     Years since quittin.8     Passive exposure: Never    Smokeless tobacco: Never   Substance Use Topics    Alcohol use: Yes     Comment: rare     History   Sexual Activity    Sexual activity: Not Currently    Partners: Female     lives alone, no children          Current Medications (antimicrobials listed in bold):     Current Facility-Administered Medications   Medication Dose Route Frequency Provider Last Rate Last Admin    aspirin EC tablet 81 mg  81 mg Oral Daily Lexi Crespo APRN CNP   81 mg at 24 0834     [Held by provider] bumetanide (BUMEX) tablet 1 mg  1 mg Oral Daily Jordan Grimes DO   1 mg at 11/05/24 0758    calcium citrate (CITRACAL) tablet 950 mg  950 mg Oral Daily Leix Crespo APRN CNP   950 mg at 11/07/24 0834    darolutamide (NUBEQA) tablet 600 mg  600 mg Oral BID Lexi Crespo APRN CNP   600 mg at 11/07/24 0834    heparin lock flush 10 unit/mL injection 5-10 mL  5-10 mL Intracatheter Q24H Lexi Crespo APRN CNP   5 mL at 11/07/24 0635    heparin lock flush 100 unit/mL injection 5-10 mL  5-10 mL Intracatheter Q28 Days Lexi Crespo APRN CNP        insulin aspart (NovoLOG) injection (RAPID ACTING)  1-7 Units Subcutaneous TID AC Lexi Crespo APRN CNP   1 Units at 11/06/24 1707    insulin aspart (NovoLOG) injection (RAPID ACTING)  1-5 Units Subcutaneous At Bedtime Lexi Crespo APRN CNP        metoprolol succinate ER (TOPROL XL) 24 hr tablet 125 mg  125 mg Oral Daily Jordan Grimes DO   125 mg at 11/07/24 0834    pantoprazole (PROTONIX) EC tablet 40 mg  40 mg Oral QAM AC Tigist Yepez MD   40 mg at 11/07/24 0834    piperacillin-tazobactam (ZOSYN) intermittent infusion 3.375 g  3.375 g Intravenous Q6H Jordan Grimes  mL/hr at 11/07/24 0614 3.375 g at 11/07/24 0614    polyethylene glycol (MIRALAX) Packet 17 g  17 g Oral BID Jordan Grimes DO   17 g at 11/06/24 0744    predniSONE (DELTASONE) tablet 5 mg  5 mg Oral Daily Lexi Crespo APRN CNP   5 mg at 11/07/24 0834    sodium chloride (PF) 0.9% PF flush 10-20 mL  10-20 mL Intracatheter Q28 Days Lexi Crespo APRN CNP   20 mL at 10/29/24 1714    sodium chloride (PF) 0.9% PF flush 3 mL  3 mL Intracatheter Q8H Lexi Crespo APRN CNP   3 mL at 11/07/24 0839    [Held by provider] spironolactone (ALDACTONE) tablet 50 mg  50 mg Oral Daily Lexi Crespo APRN CNP   50 mg at 10/31/24 0913    [Held by provider] valsartan (DIOVAN) tablet 160 mg  160 mg Oral Daily Lexi Crespo APRN  CNP   160 mg at 10/30/24 0816          Allergies:     Allergies   Allergen Reactions    Bee Venom Swelling     Gum swelling    Haemophilus B Polysaccharide Vaccine Other (See Comments)     PN: delirium  fever    Haemophilus Influenzae Nausea and Other (See Comments)     PN: delirium  fever    Other Reaction(s): Fever    PN: delirium  fever   PN: delirium  fever    Pneumococcal Vaccine      Other Reaction(s): *Unknown, adverse reaction          Physical Exam:   Vitals were reviewed  Patient Vitals for the past 24 hrs:   BP Temp Temp src Pulse Resp SpO2   11/07/24 0735 116/46 98.2  F (36.8  C) Oral -- -- 94 %   11/07/24 0400 111/57 97.6  F (36.4  C) Oral 65 19 97 %   11/07/24 0000 114/60 98.7  F (37.1  C) Oral 70 20 --   11/06/24 2000 105/51 97.8  F (36.6  C) Oral 72 17 95 %   11/06/24 1937 100/48 97.9  F (36.6  C) Oral 77 21 97 %   11/06/24 1649 91/50 97.5  F (36.4  C) Oral 74 18 97 %   11/06/24 1145 98/43 98.1  F (36.7  C) Oral 74 -- 95 %     Ranges for his vital signs:  Temp:  [97.5  F (36.4  C)-98.7  F (37.1  C)] 98.2  F (36.8  C)  Pulse:  [65-77] 65  Resp:  [17-21] 19  BP: ()/(43-60) 116/46  Cuff Mean (mmHg):  [72-74] 72  SpO2:  [94 %-97 %] 94 %    Physical Examination:  GENERAL:  alert,oriented,  in bed in no acute distress. pale  HEENT:  Head is normocephalic, atraumatic   EYES:  Eyes have anicteric sclerae without conjunctival injection   ENT:  Oropharynx is moist without exudates or ulcers. Tongue is midline  NECK:  Supple. No  Cervical lymphadenopathy  LUNGS:  Clear to auscultation bilateral. decreased breath sounds in bases  CARDIOVASCULAR:  Regular rate and rhythm with 2/6 murmurs, .  ABDOMEN:  Normal bowel sounds, soft, distended. abdominal wall edematous, will mid redness on left lower abdominal wall. bilateral lower wall edma and mild tendernss   SKIN:  No acute rashes.  Line(s) are in place without any surrounding erythema or exudate. No stigmata of endocarditis.  EXTREMITIES: moderate edema  bilateral legs   NEUROLOGIC:  alert, oriented          Laboratory Data:     Inflammatory Markers    Recent Labs   Lab Test 04/16/24  1349 08/15/22  1003   SED 25* 21*   CRP  --  7.9     Hematology Studies    Recent Labs   Lab Test 11/07/24  0433 11/06/24  0512 11/05/24  0426 11/04/24  0438 11/03/24  0428 11/02/24  0418 09/05/24  1021 08/21/24  0634 08/20/24  0606 08/19/24  1714 07/21/21  1238 04/05/21  0950 01/12/21  0838 09/11/20  1619   WBC 6.9 7.9 9.6 11.3* 11.4* 7.6   < > 2.7* 1.4* 2.7*   < > 4.5 4.6 5.0   ANEU  --   --   --   --   --   --   --  2.1 1.0* 2.4  --  3.3 3.3 3.4   AEOS  --   --   --   --   --   --   --  0.0 0.0 0.0  --  0.1 0.1 0.1   HGB 7.8* 8.6* 8.7* 8.4* 8.0* 8.3*   < > 9.1* 8.3* 9.7*   < > 14.1 14.0 14.0   * 103* 103* 104* 103* 103*   < > 96 97 96   < > 98 98 99   PLT 45* 39* 45* 47* 47* 44*   < > 45* 41* 46*   < > 63* 59* 84*    < > = values in this interval not displayed.     Immune Globulin Studies    Recent Labs   Lab Test 04/16/24  1349      IGM 26*        Metabolic Studies     Recent Labs   Lab Test 11/07/24  0433 11/06/24  0512 11/05/24  0426 11/04/24 0438 11/03/24 0428    141 142 140 143   POTASSIUM 3.5 3.5 3.8 4.1 3.9   CHLORIDE 103 105 104 104 105   CO2 27 28 28 28 28   BUN 47.3* 51.6* 51.2* 51.9* 44.8*   CR 1.51* 1.63* 1.85* 1.72* 1.56*   GFRESTIMATED 48* 44* 38* 41* 47*     Hepatic Studies    Recent Labs   Lab Test 11/07/24  0433 11/06/24  0512 11/05/24  0426 11/04/24  0438 11/03/24  0428 11/02/24  0418   BILITOTAL 0.7 0.8 0.8 0.7 0.7 0.7   ALKPHOS 141 137 125 123 120 142   ALBUMIN 2.6* 2.6* 2.7* 2.6* 2.8* 2.9*   AST 35 29 26 25 24 29   ALT 16 13 12 11 11 11     Thyroid Studies    Recent Labs   Lab Test 07/03/24  1511 06/26/24  0709 04/16/24  1349 04/24/19  1020 08/10/18  0924   TSH 3.22 6.07* 5.38*   < > 4.77*   T4 1.35  --  1.13   < > 1.06   T3  --   --   --   --  104    < > = values in this interval not displayed.     Urine Studies    Recent Labs   Lab  Test 11/05/24  2208 04/16/24  1407 02/21/23  0348 07/08/20  1721 11/27/17  1905   LEUKEST Negative Negative Negative Negative Negative   WBCU 1 0  --  0 0     Hepatitis B Testing   Recent Labs   Lab Test 04/16/24  1349   HBCAB Nonreactive   HEPBANG Nonreactive     Hepatitis C Testing     Hepatitis C Antibody   Date Value Ref Range Status   04/16/2024 Reactive (A) Nonreactive Final     Comment:     The detection of anti-HCV antibodies indicates a current HCV infection or past infection that has resolved, or false HCV antibody positivity.     The CDC recommends that a reactive result should be followed by Nucleic acid testing for HCV RNA. If HCV RNA is detected, that indicates current HCV infection.    If HCV RNA is not detected, that indicates either past, resolved HCV infection, or false HCV antibody positivity.

## 2024-11-07 NOTE — PROGRESS NOTES
[facility], [place]    Medicine Progress Note     Abbreviated version:     Gucci Logan is a 74 year old male with a past medical history of HCM w/ EDGAR, w/ valsalva LVOTO gradient, HFpEF, HTN, Metastatic Prostate Cancer, Decompensated Cirrhosis 2/2 HCV c/b ascites, SBP, CKD Stage III, T2DM, class II obesity. He was a direct admit 10/29/24 for escalation of his outpatient diuretic regimen ISO HCM, unable to make frequent outpatient labs and appts d/t mobility issues. Initially admitted to the Cardiology service, but hospital course c/b SBP after which he was transferred to the Medicine service on 10/31/24. Patient had initially been on a bumex infusion however after discussion with hepatology this was stopped due to concern for volume depletion. Repeat Para done on 11/2 with only slight decrease in cell count so abx switched from ceftriaxone to zosyn, cell count down on 11/6 re-tap. ID consult pending.     Dispo barriers:   [] Final plan on SBP treatment  [] Diuretic plan  [] TCU acceptance    Skip to bottom of note for more detailed problem list    DVT Prophylaxis: has been on hold due to thrombocytopenia  Rankin Catheter: Not present  Cardiac Monitoring: None  Code Status: Full Code      Diet: Regular Diet Adult  Snacks/Supplements Adult: Other; If pt would like a snack or suppelment, please send; With Meals        Fracisco Wilkins DO  Hospitalist  Pager: [phone]  Available on [employer] Tools    ______________________________________________________________________    Subjective    Patient doing well, complains of extreme shortness of breath with activity. He     Objective    Temp:  [97.5  F (36.4  C)-98.7  F (37.1  C)] 97.6  F (36.4  C)  Pulse:  [65-77] 65  Resp:  [17-21] 19  BP: ()/(43-60) 111/57  Cuff Mean (mmHg):  [72-74] 72  SpO2:  [95 %-97 %] 97 %     Sitting up in bed in no distress  Awake, alert  Speech is clear and coherent  Abdomen is non distended  RR and WOB  normal    Assessment & Plan     Shortness of Breath with Exertion  - high risk for DVT/PE given immobility, age, cancer diagnosis, but he's not tachycardic or hypoxemic and his Horry Criteria score is only 3 (8% risk); plt are low so DVT ppx has been on hold  - suspect this is due to his HCM and being intravascularly depleted, continue to monitor  - D dimer unlikely to be helpful and when positive would force me into ordering a CT PE protocol; would prefer to avoid given recovering kidneys    Spontaneous Bacterial Peritonitis  Cirrhosis 2/2 HCV, Newly Decompensated, c/b ascites, SBP  Hx of cirrhosis 2/2 HCV (tx as below). PTA pt's cirrhosis had largely been compensated, but since beginning chemotherapy for metastatic prostate cancer (see below), he has had new onset ascites this hospitalization. Paracentesis performed 10/30 showed ascitic fluid was c/w SBP, and he was started on Ceftriaxone at that time, given 50 g 25% albumin. GI following this hospitalization and they recommend holding off on diuresis as they feel he is intravascularly depleted. Primary hepatologist is Dr. Renteria. Last EGD 05/2023 w/o e/o varices. Repeat Para done on 11/2 with only slight decrease in cell count (from 580 to 558) so abx switched from ceftriaxone to zosyn. Another paracentesis performed 11/6 with decrease in cell count from 558 to 452. ID consulted for recommendations on completing SBP treatment given possibility of cell count elevation due to malignancy, though cytology performed with 11/2 tap did not demonstrate malignant cells.  - away recs from ID for antibiotics, continue zosyn     RENNY, suspect prerenal, improving  CKD Stage III  Baseline Cr ~0.8-1 recently. On review of prior BMPs, appears that his Cr periodically rises during periods c/w intravascular depletion ISO.   - hold diuretics per GI; if Cr continues to trend upward can consult nephrology  - urine sodium low and urine concentrated, Cr improved with holding diuretic --  probably dry and will benefit from continuing to hold diuretic + normal intake +/- albumin     Hypomagnesemia   ISO RENNY as above. RN protocol for replacement.     HCM with EDGAR with Valsalva LVOTO Gradient  Acute-on-Chronic HFpEF  HTN  Longstanding hx of HCM, recently established care w/ [facility] for HF. Has been struggling w/ worsening BLE edema, increased abdominal distension (weeping ascites), from poorly controlled hypervolemia. PTA diuretic regimen w/ Spironolactone was deemed inadequate, and given his difficulties w/ mobility at home and need for frequent appts/labs w/ titration of OP diuresis, decision was made to directly admit him instead for more aggressive titration of diuretic regimen w/ close cardiac monitoring. Started on Bumex drip here by Cards. Repeat ECHO 10/30/24 showing hyperkinetic w/ EF 65-70%, dynamic outflow tract obstruction, peak gradient 119mmHg at rest, grade II moderate diastolic dysfunction. Transferred to Medicine service 10/31 given SBP.  - Cardiology involved, appreciate assistance              - Spironolactone and Bumex drip stopped 10/31                          - 1 mg bumex  given 11/4              - Hold PTA Valsartan 160mg daily w/ low BP, worsening LVOT gradient   - reduced PTA Metoprolol succinate 150mg daily to 125 mg daily   - Avoid vasodilators   - Goal K >4 and Mg >2              - Lymphedema consulted              - BMP daily              - Strict I/Os              - Daily standing weights (per pt, a 'good' weight for him is 230 lbs)     Metastatic Prostate Cancer  Chronic Pancytopenia   Initially diagnosed earlier this year incidentally following a fall at home, fracturing his L femur. ORIF 05/24/24 and curretage samples c/w adenocarcinoma of prostate origin. Has since followed w/ Oncology as an OP, last saw 10/24/24. PTA on Daralutamide BID, Docetaxel (C4D1 was on 10/24/24) and Leuprolide [id number] (last 9/5/24). Also gets Neulasta, last 10/25. Has chronic  pancytopenia, likely d/t multiple comorbidities (cirrhosis, CKD) and iatrogenic (chemotherapy). Baseline hgb ~8-9 recently, and had drift today down to 6.9, but no e/o active bleeding.   - Oncology consulted here 10/31 and recommended to continue Daralutamide, they signed off  - Continue PTA Prednisone   - Transfuse for Hgb <7      Type 2 Diabetes Mellitus, non-insulin-dependent, well-controlled  Last A1c 5.8% on 10/29/24. PTA Metformin. BG checks past 24 hours are all within goal (<180).   - Hold PTA Metformin given RENNY as above  - Continue medium sliding scale as started on admission  - BG checks TID AC + at bedtime + 0200  - Hypoglycemia protocol     Diet: Snacks/Supplements Adult: Ensure Clear; Between Meals  Regular Diet Adult    DVT Prophylaxis: Pneumatic Compression Devices  Rankin Catheter: Not present  Lines: PRESENT      Port a Cath 08/12/24 Single Lumen Right-Site Assessment: WDL      Cardiac Monitoring: None  Code Status: Full Code      Billing    MDM: high  See problem list above  Reviewed CMP, CBC, glucoses, cytology from 11/2 tap, discussed with ID  high risk due to continued need for inpatient management of SBP

## 2024-11-07 NOTE — PROGRESS NOTES
Care Management Follow Up    Additional information    11/7 - CHW delegated by MITUL, has sent more TCU referrals. See below.    Pending:     [facility]  [address] [facility] [address]  Ph: [phone]  Fax: [phone]  11/5: Spoke to Zita, she said this patient will needs a deep review and she hasn't gotten to it yet.     [facility]  [address] [address]  Ph: [phone]  Fax: [phone]  11/5: No follow-up call placed, referral sent yesterday    [facility]  [address] [address]  Ph: [phone]  Fax: [phone]  11/7: Initial referral sent    [facility]  [address] [facility] [address] [address]  Ph: [phone]   Fax: [phone]  11/7: Initial referral sent          Declined:     [facility]  [address] [facility] [address]  Ph: [phone]  Fax: [phone]  11/4: Acuity too high, cost of cancer treatment     [facility]   [address] [facility] [address]  Ph: [phone]  Fax: [phone]  11/5: Spoke with Rut in admissions, she stated they cannot meet pt's needs.     [facility]  [address] [place] [address]  Ph: [phone]  Fax: [phone]  11/5: Spoke to Zita in admissions, they haven't reviewed the pt yet.  11/6: Declined, no bed available    [facility]  [address] [facility] [place] [address]  Ph: [phone]  Fax: [phone]   11/7: Initial referral sent  11/7: Declined, no bed available      Anca Linder  [facility] Worker  6A, 6B, 6C  Ph [phone]  Fax [phone]

## 2024-11-07 NOTE — PLAN OF CARE
Goal Outcome Evaluation:    NURSING PROGRESS NOTE  Shift Summary          Date: November 7, 2024     Neuro/Musculoskeletal:  A&Ox4.   Cardiac:  SR.  VSS.     Respiratory:  Sating in the 90s on .  GI/:  Adequate urine output.  LBM:   Diet/Appetite:  Tolerating  diet.  Activity:  Assist of    Pain:  Denies.   Skin:  No new deficits noted.   LDAs + Drips/IVF:    Protocols/Labs:      Pertinent Shift Updates:        Plan:        Aracelis Griffith RN  .................................................... November 7, 2024   1:53 AM  [facility] ([facility]): [facility] ICU (Unit 6C)

## 2024-11-07 NOTE — PLAN OF CARE
Neuro: A&Ox4. [place]   Cardiac: SR. VSS.   Respiratory: Sating 98 on RA. Pt reports shortness of breath during activity, provider aware.   GI/: Adequate urine output. BM X2 - loose bloody provider aware.   Diet/appetite: Tolerating regular diet. Eating well. ACHS  Activity:  Assist of 1-2 GB + walker, up to chair  Pain: denies   Skin: No new deficits noted.  LDA's:  Port - SL     Plan: Continue with POC. Notify primary team with changes.

## 2024-11-08 ENCOUNTER — APPOINTMENT (OUTPATIENT)
Dept: GENERAL RADIOLOGY | Facility: CLINIC | Age: 74
DRG: 291 | End: 2024-11-08
Attending: INTERNAL MEDICINE
Payer: COMMERCIAL

## 2024-11-08 ENCOUNTER — APPOINTMENT (OUTPATIENT)
Dept: CARDIOLOGY | Facility: CLINIC | Age: 74
End: 2024-11-08
Attending: STUDENT IN AN ORGANIZED HEALTH CARE EDUCATION/TRAINING PROGRAM
Payer: COMMERCIAL

## 2024-11-08 LAB
ALBUMIN SERPL BCG-MCNC: 2.7 G/DL (ref 3.5–5.2)
ALP SERPL-CCNC: 153 U/L (ref 40–150)
ALT SERPL W P-5'-P-CCNC: 18 U/L (ref 0–70)
ANION GAP SERPL CALCULATED.3IONS-SCNC: 10 MMOL/L (ref 7–15)
AST SERPL W P-5'-P-CCNC: 39 U/L (ref 0–45)
BILIRUB DIRECT SERPL-MCNC: 0.27 MG/DL (ref 0–0.3)
BILIRUB SERPL-MCNC: 0.9 MG/DL
BUN SERPL-MCNC: 58 MG/DL (ref 8–23)
CALCIUM SERPL-MCNC: 7.8 MG/DL (ref 8.8–10.4)
CHLORIDE SERPL-SCNC: 104 MMOL/L (ref 98–107)
CREAT SERPL-MCNC: 1.54 MG/DL (ref 0.67–1.17)
CRP SERPL-MCNC: 16 MG/L
EGFRCR SERPLBLD CKD-EPI 2021: 47 ML/MIN/1.73M2
ERYTHROCYTE [DISTWIDTH] IN BLOOD BY AUTOMATED COUNT: 20.9 % (ref 10–15)
FERRITIN SERPL-MCNC: 498 NG/ML (ref 31–409)
GLUCOSE BLDC GLUCOMTR-MCNC: 145 MG/DL (ref 70–99)
GLUCOSE BLDC GLUCOMTR-MCNC: 154 MG/DL (ref 70–99)
GLUCOSE BLDC GLUCOMTR-MCNC: 220 MG/DL (ref 70–99)
GLUCOSE SERPL-MCNC: 135 MG/DL (ref 70–99)
HCO3 SERPL-SCNC: 25 MMOL/L (ref 22–29)
HCT VFR BLD AUTO: 24.3 % (ref 40–53)
HGB BLD-MCNC: 7.5 G/DL (ref 13.3–17.7)
IRON BINDING CAPACITY (ROCHE): 155 UG/DL (ref 240–430)
IRON SATN MFR SERPL: 85 % (ref 15–46)
IRON SERPL-MCNC: 131 UG/DL (ref 61–157)
LVEF ECHO: NORMAL
MAGNESIUM SERPL-MCNC: 2.2 MG/DL (ref 1.7–2.3)
MCH RBC QN AUTO: 32.5 PG (ref 26.5–33)
MCHC RBC AUTO-ENTMCNC: 30.9 G/DL (ref 31.5–36.5)
MCV RBC AUTO: 105 FL (ref 78–100)
MRSA DNA SPEC QL NAA+PROBE: NEGATIVE
PLATELET # BLD AUTO: 46 10E3/UL (ref 150–450)
POTASSIUM SERPL-SCNC: 4 MMOL/L (ref 3.4–5.3)
PROT SERPL-MCNC: 4.6 G/DL (ref 6.4–8.3)
RBC # BLD AUTO: 2.31 10E6/UL (ref 4.4–5.9)
SA TARGET DNA: NEGATIVE
SODIUM SERPL-SCNC: 139 MMOL/L (ref 135–145)
TRANSFERRIN SERPL-MCNC: 134 MG/DL (ref 200–360)
VIT D+METAB SERPL-MCNC: 27 NG/ML (ref 20–50)
WBC # BLD AUTO: 7 10E3/UL (ref 4–11)

## 2024-11-08 PROCEDURE — 250N000011 HC RX IP 250 OP 636: Mod: JZ | Performed by: INTERNAL MEDICINE

## 2024-11-08 PROCEDURE — 250N000012 HC RX MED GY IP 250 OP 636 PS 637: Performed by: NURSE PRACTITIONER

## 2024-11-08 PROCEDURE — 82248 BILIRUBIN DIRECT: CPT | Performed by: NURSE PRACTITIONER

## 2024-11-08 PROCEDURE — 250N000011 HC RX IP 250 OP 636: Performed by: NURSE PRACTITIONER

## 2024-11-08 PROCEDURE — 99233 SBSQ HOSP IP/OBS HIGH 50: CPT | Mod: GC | Performed by: STUDENT IN AN ORGANIZED HEALTH CARE EDUCATION/TRAINING PROGRAM

## 2024-11-08 PROCEDURE — 250N000013 HC RX MED GY IP 250 OP 250 PS 637: Performed by: STUDENT IN AN ORGANIZED HEALTH CARE EDUCATION/TRAINING PROGRAM

## 2024-11-08 PROCEDURE — 82728 ASSAY OF FERRITIN: CPT | Performed by: PEDIATRICS

## 2024-11-08 PROCEDURE — 71045 X-RAY EXAM CHEST 1 VIEW: CPT

## 2024-11-08 PROCEDURE — 120N000005 HC R&B MS OVERFLOW UMMC

## 2024-11-08 PROCEDURE — 99233 SBSQ HOSP IP/OBS HIGH 50: CPT | Performed by: PEDIATRICS

## 2024-11-08 PROCEDURE — 84466 ASSAY OF TRANSFERRIN: CPT | Performed by: PEDIATRICS

## 2024-11-08 PROCEDURE — 250N000011 HC RX IP 250 OP 636: Performed by: STUDENT IN AN ORGANIZED HEALTH CARE EDUCATION/TRAINING PROGRAM

## 2024-11-08 PROCEDURE — 87641 MR-STAPH DNA AMP PROBE: CPT | Performed by: PEDIATRICS

## 2024-11-08 PROCEDURE — 83735 ASSAY OF MAGNESIUM: CPT | Performed by: PEDIATRICS

## 2024-11-08 PROCEDURE — 250N000013 HC RX MED GY IP 250 OP 250 PS 637: Performed by: PEDIATRICS

## 2024-11-08 PROCEDURE — 99232 SBSQ HOSP IP/OBS MODERATE 35: CPT | Performed by: INTERNAL MEDICINE

## 2024-11-08 PROCEDURE — 85014 HEMATOCRIT: CPT | Performed by: NURSE PRACTITIONER

## 2024-11-08 PROCEDURE — 93306 TTE W/DOPPLER COMPLETE: CPT

## 2024-11-08 PROCEDURE — 99418 PROLNG IP/OBS E/M EA 15 MIN: CPT | Mod: GC | Performed by: STUDENT IN AN ORGANIZED HEALTH CARE EDUCATION/TRAINING PROGRAM

## 2024-11-08 PROCEDURE — 71045 X-RAY EXAM CHEST 1 VIEW: CPT | Mod: 26 | Performed by: RADIOLOGY

## 2024-11-08 PROCEDURE — 86140 C-REACTIVE PROTEIN: CPT | Performed by: PEDIATRICS

## 2024-11-08 PROCEDURE — 80053 COMPREHEN METABOLIC PANEL: CPT | Performed by: NURSE PRACTITIONER

## 2024-11-08 PROCEDURE — 36415 COLL VENOUS BLD VENIPUNCTURE: CPT | Performed by: STUDENT IN AN ORGANIZED HEALTH CARE EDUCATION/TRAINING PROGRAM

## 2024-11-08 PROCEDURE — 83550 IRON BINDING TEST: CPT | Performed by: PEDIATRICS

## 2024-11-08 PROCEDURE — 250N000013 HC RX MED GY IP 250 OP 250 PS 637: Performed by: NURSE PRACTITIONER

## 2024-11-08 PROCEDURE — 87640 STAPH A DNA AMP PROBE: CPT | Performed by: PEDIATRICS

## 2024-11-08 PROCEDURE — 93306 TTE W/DOPPLER COMPLETE: CPT | Mod: 26 | Performed by: INTERNAL MEDICINE

## 2024-11-08 PROCEDURE — 82306 VITAMIN D 25 HYDROXY: CPT | Performed by: PEDIATRICS

## 2024-11-08 PROCEDURE — 83540 ASSAY OF IRON: CPT | Performed by: PEDIATRICS

## 2024-11-08 PROCEDURE — 250N000013 HC RX MED GY IP 250 OP 250 PS 637: Performed by: INTERNAL MEDICINE

## 2024-11-08 RX ORDER — BUMETANIDE 2 MG/1
2 TABLET ORAL ONCE
Status: COMPLETED | OUTPATIENT
Start: 2024-11-08 | End: 2024-11-08

## 2024-11-08 RX ORDER — CIPROFLOXACIN 500 MG/1
500 TABLET, FILM COATED ORAL
Status: DISCONTINUED | OUTPATIENT
Start: 2024-11-08 | End: 2024-11-16 | Stop reason: HOSPADM

## 2024-11-08 RX ORDER — BUMETANIDE 2 MG/1
2 TABLET ORAL DAILY
Status: DISCONTINUED | OUTPATIENT
Start: 2024-11-09 | End: 2024-11-16 | Stop reason: HOSPADM

## 2024-11-08 RX ORDER — METOPROLOL SUCCINATE 25 MG/1
25 TABLET, EXTENDED RELEASE ORAL ONCE
Status: COMPLETED | OUTPATIENT
Start: 2024-11-08 | End: 2024-11-08

## 2024-11-08 RX ORDER — METOPROLOL SUCCINATE 50 MG/1
150 TABLET, EXTENDED RELEASE ORAL DAILY
Status: DISCONTINUED | OUTPATIENT
Start: 2024-11-09 | End: 2024-11-09

## 2024-11-08 RX ADMIN — METOPROLOL SUCCINATE 25 MG: 25 TABLET, EXTENDED RELEASE ORAL at 16:44

## 2024-11-08 RX ADMIN — BUMETANIDE 2 MG: 2 TABLET ORAL at 12:10

## 2024-11-08 RX ADMIN — INSULIN ASPART 1 UNITS: 100 INJECTION, SOLUTION INTRAVENOUS; SUBCUTANEOUS at 16:44

## 2024-11-08 RX ADMIN — PREDNISONE 5 MG: 5 TABLET ORAL at 09:39

## 2024-11-08 RX ADMIN — METOPROLOL SUCCINATE 125 MG: 25 TABLET, EXTENDED RELEASE ORAL at 09:39

## 2024-11-08 RX ADMIN — ALTEPLASE 2 MG: 2.2 INJECTION, POWDER, LYOPHILIZED, FOR SOLUTION INTRAVENOUS at 05:44

## 2024-11-08 RX ADMIN — Medication 950 MG: at 09:39

## 2024-11-08 RX ADMIN — OXYCODONE HYDROCHLORIDE 5 MG: 5 TABLET ORAL at 22:03

## 2024-11-08 RX ADMIN — PIPERACILLIN SODIUM AND TAZOBACTAM SODIUM 3.38 G: 3; .375 INJECTION, SOLUTION INTRAVENOUS at 06:18

## 2024-11-08 RX ADMIN — ASPIRIN 81 MG CHEWABLE TABLET 81 MG: 81 TABLET CHEWABLE at 09:39

## 2024-11-08 RX ADMIN — PANTOPRAZOLE SODIUM 40 MG: 40 TABLET, DELAYED RELEASE ORAL at 09:39

## 2024-11-08 RX ADMIN — HEPARIN, PORCINE (PF) 10 UNIT/ML INTRAVENOUS SYRINGE 5 ML: at 07:00

## 2024-11-08 RX ADMIN — CIPROFLOXACIN 500 MG: 500 TABLET ORAL at 12:11

## 2024-11-08 NOTE — PROGRESS NOTES
Care Management Follow Up    Length of Stay (days): 10    Expected Discharge Date: 11/08/2024     Concerns to be Addressed: discharge planning     Patient plan of care discussed at interdisciplinary rounds: Yes    Anticipated Discharge Disposition: TCU vs home with asisst/home care PT/OT   Anticipated Discharge Services: TCU therapy services  Anticipated Discharge DME: n/a    Patient/family educated on Medicare website which has current facility and service quality ratings:  yes  Education Provided on the Discharge Plan:  yes  Patient/Family in Agreement with the Plan: yes    Referrals Placed by CM/SW:      [place] [address] [address]  4th floor of [facility]  Phone: [phone]  Nurse to Nurse: [phone]   - 11/8: Tangela in rehab admissions asked the SW about if pt can get switched from Neulasta to Neupogen (per Tangela, Neulasta cost more than 4k a dose).     MITUL spoke with the last Hem/Onc attending who signed off on 10/31 (Dimitrios) at 3:17pm over the phone and he told the SW that the pt will only take oral meds for cancer while at TCU/at home (meds get delivered to pt's house) and will not have any OP infusions while at TCU.     SW updated Tangela in rehab admissions.     Private pay costs discussed: Not applicable    Discussed  Partnership in Safe Discharge Planning  document with patient/family: No     Handoff Completed: No, handoff not indicated or clinically appropriate    ___________________    IVANIA Lloyd, LGSW  6C , covering beds 6401 to 6519  [facility]   Phone: [phone]  [address]C [place]

## 2024-11-08 NOTE — CONSULTS
Cardiology Consult Note     Date of Service: 11/08/2024  Patient: Gucci Logan  MRN: [id number]  Admission Date: 10/29/2024  Hospital Day: 10    Reason for Consult: HCM    Assessment and Plan:   Gucci Logan is a 74 year old male with a history of hypertrophic obstructive cardiomyopathy, metastatic prostate cancer on active chemotherapy, and HCV cirrhosis who is admitted on 10/29 for decompensated heart failure and whose hospital course has been prolonged in the setting of decompensated cirrhosis and SBP. Cardiology has been re-consulted due to persistent dyspnea and lightheadedness limiting his current disposition options.       Hypertrophic Obstructive Cardiomyopathy: Patient with known HOCM with reverse septal curvature. Prior to hospitalization his resting gradient was 19 mmHg and with valsalva this increased to 72 mmHg. He has been maintained on metoprolol succinate 150mmHg, valsartan 160mg daily, and bumex 2mg daily at home. He has had a complicated admission here with intermittent diuresis and several hospital acquired infections. Repeat echocardiogram today with difficult to assess gradients given also sampling MR though is ~81 mmHg estimated off of underlying MR peak velocity. I suspect his ongoing dyspnea is multifactorial though at least somewhat related to his LVOT gradient though there may also be contributing factors related to his ascites. Ongoing treatment for relief of his LVOT gradient as below:    Brief Recommendations:  - increase his beta blocker to Metoprolol Succinate 150mg daily (ordered)  - continue to hold Valsartan  - hold further diuresis for 1-2 days  - consider therapeutic paracentesis for ongoing dyspnea  - agree with consideration of CT PE or V/Q    Patient was seen and plan of care was discussed with attending physician Dr. Long.   We will continue to follow this patient.   Please don't hesitate to contact our team with any additional questions or  concerns.    Jagjit Arredondo MD  Cardiology Fellow    Subjective   History of Present Illness:    Gucci Logan is a 74 year old male with a history of hypertrophic obstructive cardiomyopathy, metastatic prostate cancer on active chemotherapy, and HCV cirrhosis who is admitted on 10/29 for decompensated heart failure and whose hospital course has been prolonged in the setting of decompensated cirrhosis and SBP. Cardiology has been re-consulted due to persistent dyspnea and lightheadedness limiting his current disposition options.     On interview with the patient, he reports doing poorly overall. For the past three days he has noted progressive dyspnea with minimal exertion. He describes this as becoming profoundly winded going from even the bathroom to his bed. He denies associated chest pain or presyncopal symptoms. He was not experiencing this prior to admission. He has not had symptoms similar to this before. He is uncertain why this is occurring. He notes he had a paracentesis a few days ago which didn't improve his symptoms. His beta blocker dose was reduced just prior to symptom onset.        Review of Systems:  A comprehensive ROS was performed and found to be negative or non-contributory with the exception of that noted in the HPI above.    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    Diverticular disease of colon 07/14/2015    Esophageal reflux 03/01/2014    Gastroesophageal reflux disease, esophagitis presence not  specified 10/06/2016    IMO Regulatory Load OCT 2020      Glaucoma suspect of both eyes 04/30/2024    Hearing problem     Hiatal hernia     Hyperlipidemia LDL goal <100 04/19/2017    Hypertension     Hypertension goal BP (blood pressure) < 140/90 02/08/2013    Intertrochanteric fracture of left femur, closed, initial encounter (H) 05/19/2024    Jaundice 05/31/2008    Liver disease     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 Not keen to use it.     Stage 3 chronic kidney disease, unspecified whether stage 3a or 3b CKD (H) 01/23/2024    Syncope, unspecified syncope type 02/20/2023    Thrombocytopenia (H) 08/28/2008    Overview:   8/28/2008, attributed to liver disease with portal hypertension and functional splenomegaly       Past Surgical History:   Procedure Laterality Date    ARTHROSCOPY KNEE RT/LT      (L) with partial medial meniscectomy    CATARACT IOL, RT/LT  Nov and Dec 2017    COLONOSCOPY N/A 4/24/2019    Procedure: COLONOSCOPY, WITH POLYPECTOMY AND BIOPSY;  Surgeon: Leventhal, Thomas Michael, MD;  [facility]    COLONOSCOPY N/A 9/14/2022    Procedure: COLONOSCOPY;  Surgeon: Rafa Renee MD;  Location:  GI    DACRYOCYSTORHINOSTOMY Left 10/16/2018    Procedure: DACRYOCYSTORHINOSTOMY;  Surgeon: Madhu Krause MD;  Location: [facility]    ENDOSCOPIC ENDONASAL SURGERY  1994    ENDOSCOPY  2-19-15    ESOPHAGOSCOPY, GASTROSCOPY, DUODENOSCOPY (EGD), COMBINED N/A 4/24/2019    Procedure: COMBINED ESOPHAGOSCOPY, GASTROSCOPY,  DUODENOSCOPY (EGD) - hold aspirin ibuprofen or naproxen for one week prior (per physician order);  Surgeon: Leventhal, Thomas Michael, MD;  Location: [facility]    ESOPHAGOSCOPY, GASTROSCOPY, DUODENOSCOPY (EGD), COMBINED N/A 2023    Procedure: Esophagoscopy, gastroscopy, duodenoscopy, combined;  Surgeon: Rafa Renee MD;  Location: [facility] GI    EYE SURGERY      Hernia surgery Left     NASAL/SINUS POLYPECTOMY      OPEN REDUCTION INTERNAL FIXATION HIP NAILING Left 2024    Procedure: open reduction internal fixation hip nailing left;  Surgeon: Rafa Wagner MD;  Location: [facility] OR    REPAIR PTOSIS Left 10/16/2018    Procedure: LEFT UPPER LID PTOSIS AND BILATERAL  BROW PTOSIS REPAIR WITH LEFT DACRYOCYSTORHINOSTOMY ;  Surgeon: Madhu Krause MD;  Location:  [facility]    REPAIR PTOSIS BROW Bilateral 10/16/2018    Procedure: REPAIR PTOSIS BROW;  Surgeon: Madhu Krause MD;  Location:  [facility]    ROTATOR CUFF REPAIR RT/LT Right     ZZC OPEN RX ANKLE DISLOCATN+FIXATN      (R)    ZZC SPINAL FUSION,ANT,EA ADNL LEVEL      T12 - L1     Social History     Socioeconomic History    Marital status: Single    Number of children: 0    Years of education: 18   Occupational History    Occupation: Contractor     Employer: [employer]     Comment: Not working now   Tobacco Use    Smoking status: Former     Current packs/day: 0.00     Types: Cigarettes     Start date: 1990     Quit date: 1999     Years since quittin.8     Passive exposure: Never    Smokeless tobacco: Never   Vaping Use    Vaping status: Never Used   Substance and Sexual Activity    Alcohol use: Yes     Comment: rare    Drug use: No    Sexual activity: Not Currently     Partners: Female   Other Topics Concern    Parent/sibling w/ CABG, MI or angioplasty before 65F 55M? No     Social Drivers of Health     Financial Resource Strain: Low Risk  (10/29/2024)    Financial Resource Strain     Within the past 12 months, have you or your family members you live  with been unable to get utilities (heat, electricity) when it was really needed?: No   Food Insecurity: Low Risk  (10/29/2024)    Food Insecurity     Within the past 12 months, did you worry that your food would run out before you got money to buy more?: No     Within the past 12 months, did the food you bought just not last and you didn t have money to get more?: No   Transportation Needs: Low Risk  (10/29/2024)    Transportation Needs     Within the past 12 months, has lack of transportation kept you from medical appointments, getting your medicines, non-medical meetings or appointments, work, or from getting things that you need?: No   Social Connections: Socially Integrated (8/6/2024)    Received from [facility]    Social Connections     Do you often feel lonely or isolated from those around you?: 0   Interpersonal Safety: Low Risk  (10/29/2024)    Interpersonal Safety     Do you feel physically and emotionally safe where you currently live?: Yes     Within the past 12 months, have you been hit, slapped, kicked or otherwise physically hurt by someone?: No     Within the past 12 months, have you been humiliated or emotionally abused in other ways by your partner or ex-partner?: No   Housing Stability: Low Risk  (10/29/2024)    Housing Stability     Do you have housing? : Yes     Are you worried about losing your housing?: No      Family History   Problem Relation Age of Onset    Alzheimer Disease Mother 85    Dementia Mother     Cerebrovascular Disease Father 50    Cancer Father     Hypertension Father     Neurologic Disorder No family hx of     Diabetes No family hx of      Medications Prior to Admission   Medication Sig Dispense Refill Last Dose/Taking    acetaminophen (TYLENOL) 325 MG tablet Take 2 tablets (650 mg) by mouth every 4 hours as needed for other (For optimal non-opioid multimodal pain management to improve pain control.) 100 tablet 0 Past Week    aspirin 81 MG EC  tablet Take 1 tablet (81 mg) by mouth daily 90 tablet 3 10/29/2024    bumetanide (BUMEX) 2 MG tablet Take 1 tablet (2 mg) by mouth daily. 30 tablet 0 10/29/2024    calcium citrate (CITRACAL) 950 (200 Ca) MG tablet Take 1 tablet (950 mg) by mouth daily 120 tablet 3 10/29/2024    darolutamide (NUBEQA) 300 MG tablet Take 2 tablets (600 mg) by mouth 2 times daily for 21 days. . Swallow tablets whole. Take with food. Avoid grapefruit or grapefruit juice. 84 tablet 0 10/29/2024 at  8:00 AM    diclofenac (VOLTAREN) 1 % topical gel Apply 4 g topically 3 times daily as needed for moderate pain (bursitis) 150 g 1 Past Month    metFORMIN (GLUCOPHAGE) 500 MG tablet Take 1 tablet (500 mg) by mouth 2 times daily (with meals). 180 tablet 1 10/29/2024    methocarbamol (ROBAXIN) 500 MG tablet Take 1 Tablet (500 mg) by mouth every 6 hours if needed for Muscle Spasm PO 1st choice.   More than a month    metoprolol succinate ER (TOPROL XL) 100 MG 24 hr tablet 1 1/2 ( 150 mg ) po daily 135 tablet 1 10/29/2024    multivitamin w/minerals (THERA-VIT-M) tablet Take 1 tablet by mouth daily   10/29/2024    omeprazole (PRILOSEC) 20 MG DR capsule TAKE 1 CAPSULE BY MOUTH ONCE DAILY 30 TO 60 MINUTES BEFORE A MEAL 90 capsule 1 10/29/2024    oxyCODONE (ROXICODONE) 5 MG tablet Take 1-2 tablets (5-10 mg) by mouth every 6 hours as needed for pain. 70 tablet 0 Past Week    predniSONE (DELTASONE) 5 MG tablet Take 1 tablet (5 mg) by mouth daily (with breakfast) Do not take prednisone on days when taking dexamethasone. 60 tablet 1 10/29/2024    tiZANidine (ZANAFLEX) 4 MG tablet TAKE 1/2 (ONE-HALF) TO 1  TABLET BY MOUTH UP TO THREE TIMES DAILY AS NEEDED FOR MUSCLE PAIN 40 tablet 0 More than a month    traMADol (ULTRAM) 50 MG tablet Take 50 mg by mouth daily as needed for severe pain.   Past Week    valsartan (DIOVAN) 160 MG tablet Take 1 tablet (160 mg) by mouth daily 90 tablet 1 10/29/2024    vitamin D3 (CHOLECALCIFEROL) 50 mcg (2000 units) tablet Take 1  tablet (50 mcg) by mouth daily 120 tablet 3 10/29/2024     Current Facility-Administered Medications   Medication Dose Route Frequency Provider Last Rate Last Admin    acetaminophen (TYLENOL) Suppository 650 mg  650 mg Rectal Q4H PRN Lexi Crespo APRN CNP        acetaminophen (TYLENOL) tablet 650 mg  650 mg Oral Q4H PRN Lexi Crespo APRN CNP   650 mg at 11/07/24 2007    alteplase (CATHFLO ACTIVASE) injection 1-2 mg  1-2 mg Intravenous Q2H PRN Cruz Balckwell MD   2 mg at 11/08/24 0544    alum & mag hydroxide-simethicone (MAALOX) suspension 30 mL  30 mL Oral Q4H PRN Lexi Crespo APRN CNP        aspirin EC tablet 81 mg  81 mg Oral Daily Lexi Crespo APRN CNP   81 mg at 11/08/24 0939    [START ON 11/9/2024] bumetanide (BUMEX) tablet 2 mg  2 mg Oral Daily Fracisco Wilkins,         calcium carbonate (TUMS) chewable tablet 500 mg  500 mg Oral Daily PRN Louis Hanson PA-C   500 mg at 11/05/24 2215    calcium citrate (CITRACAL) tablet 950 mg  950 mg Oral Daily Lexi Crespo APRN CNP   950 mg at 11/08/24 0939    ciprofloxacin (CIPRO) tablet 500 mg  500 mg Oral Q24H VINCENT Fracisco Wilkins DO   500 mg at 11/08/24 1211    darolutamide (NUBEQA) tablet 600 mg  600 mg Oral BID Lexi Crespo APRN CNP   600 mg at 11/08/24 0957    glucose gel 15-30 g  15-30 g Oral Q15 Min PRN Lexi Crespo APRN CNP        Or    dextrose 50 % injection 25-50 mL  25-50 mL Intravenous Q15 Min PRN Lexi Crespo APRN CNP        Or    glucagon injection 1 mg  1 mg Subcutaneous Q15 Min PRN Lexi Crespo APRN CNP        diclofenac (VOLTAREN) 1 % topical gel 4 g  4 g Topical TID PRN Lexi Crespo APRN CNP        famotidine (PEPCID) tablet 10 mg  10 mg Oral BID PRN Jordan Grimes DO   10 mg at 11/04/24 1612    heparin lock flush 10 unit/mL injection 5-10 mL  5-10 mL Intracatheter Q1H PRN Lexi Crespo APRN CNP   5 mL at 11/04/24 0646    heparin lock flush 10  unit/mL injection 5-10 mL  5-10 mL Intracatheter Q24H Lexi Crespo APRN CNP   5 mL at 11/08/24 0700    heparin lock flush 100 unit/mL injection 5-10 mL  5-10 mL Intracatheter Q28 Days Lexi Crespo APRN CNP        insulin aspart (NovoLOG) injection (RAPID ACTING)  1-7 Units Subcutaneous TID AC Lexi Crespo APRN CNP   1 Units at 11/07/24 1751    insulin aspart (NovoLOG) injection (RAPID ACTING)  1-5 Units Subcutaneous At Bedtime Lexi Crespo APRN CNP        lidocaine (LMX4) cream   Topical Q1H PRN Lexi Crespo APRN CNP        lidocaine 1 % 0.1-1 mL  0.1-1 mL Other Q1H PRN Lexi Crespo APRN CNP        medication instruction   Does not apply Continuous PRN Lexi Crespo APRN CNP        methocarbamol (ROBAXIN) tablet 500 mg  500 mg Oral Q6H PRN Lexi Crespo APRN CNP   500 mg at 11/02/24 1832    metoprolol succinate ER (TOPROL XL) 24 hr tablet 125 mg  125 mg Oral Daily Jordan Grimes DO   125 mg at 11/08/24 0939    naloxone (NARCAN) injection 0.2 mg  0.2 mg Intravenous Q2 Min PRN Tigist Yepez MD        Or    naloxone (NARCAN) injection 0.4 mg  0.4 mg Intravenous Q2 Min PRN Tigist Yepez MD        Or    naloxone (NARCAN) injection 0.2 mg  0.2 mg Intramuscular Q2 Min PRN Tigist Yepez MD        Or    naloxone (NARCAN) injection 0.4 mg  0.4 mg Intramuscular Q2 Min PRN Tigist Yepez MD        nitroGLYcerin (NITROSTAT) sublingual tablet 0.4 mg  0.4 mg Sublingual Q5 Min PRN Lexi Crespo APRN CNP        oxyCODONE (ROXICODONE) tablet 5-10 mg  5-10 mg Oral Q6H PRN Lexi Crespo APRN CNP   5 mg at 11/07/24 2007    pantoprazole (PROTONIX) EC tablet 40 mg  40 mg Oral QAM AC Tigist Yepez MD   40 mg at 11/08/24 0939    polyethylene glycol (MIRALAX) Packet 17 g  17 g Oral BID Jordan Grimes DO   17 g at 11/06/24 0744    predniSONE (DELTASONE) tablet 5 mg  5 mg Oral Daily Lexi Crespo APRN CNP   5 mg at 11/08/24 0939    prochlorperazine  (COMPAZINE) tablet 5 mg  5 mg Oral Q6H PRN Crespo Lexi E, APRN CNP        senna-docusate (SENOKOT-S/PERICOLACE) 8.6-50 MG per tablet 1 tablet  1 tablet Oral BID PRN CrespoRosamaria langstonra E, APRN CNP        Or    senna-docusate (SENOKOT-S/PERICOLACE) 8.6-50 MG per tablet 2 tablet  2 tablet Oral BID PRN CrespoRosamaria vidalra E, APRN CNP        sodium chloride (PF) 0.9% PF flush 10-20 mL  10-20 mL Intracatheter q1 min prn CrespoRosamaria langstonra E, APRN CNP   10 mL at 11/03/24 1714    sodium chloride (PF) 0.9% PF flush 10-20 mL  10-20 mL Intracatheter Q1H PRN Crespo, Lexi LAVELL, APRN CNP   10 mL at 11/03/24 0428    sodium chloride (PF) 0.9% PF flush 10-20 mL  10-20 mL Intracatheter Q28 Days CrespoLexi, APRN CNP   20 mL at 10/29/24 1714    sodium chloride (PF) 0.9% PF flush 3 mL  3 mL Intracatheter Q8H CrespoRosamaria vidalra LAVELL, APRN CNP   3 mL at 11/08/24 0940    sodium chloride (PF) 0.9% PF flush 3 mL  3 mL Intracatheter q1 min prn Crespo Lexi LAVELL, APRN CNP        [Held by provider] spironolactone (ALDACTONE) tablet 50 mg  50 mg Oral Daily CrespoLexi vidal, APRN CNP   50 mg at 10/31/24 0913    [Held by provider] valsartan (DIOVAN) tablet 160 mg  160 mg Oral Daily CrespoRosamaria langstonra LAVELL, APRN CNP   160 mg at 10/30/24 0816         Objective   Physical Exam:    Blood pressure 102/44, pulse 74, temperature 98.2  F (36.8  C), temperature source Oral, resp. rate 19, weight 108.5 kg (239 lb 4.8 oz), SpO2 96%.    Exam:  General: NAD  HEENT: no scleral icterus or injection  CARDIAC: RRR, + systolic murmur  RESP:  CTAB  GI: +distended with fluid wave  : No salazar  EXTREMITIES: 1+ LE edema  SKIN: No acute lesions appreciated  NEURO: No focal deficits    Labs & Studies: I personally reviewed and interpreted the following studies:  CMP  Recent Labs   Lab 11/08/24  0844 11/08/24  0641 11/07/24  2200 11/07/24  1751 11/07/24  0738 11/07/24  0433 11/06/24  0749 11/06/24  0512 11/05/24  0755 11/05/24  0426   NA  --  139  --   " --   --  138  --  141  --  142   POTASSIUM  --  4.0  --   --   --  3.5  --  3.5  --  3.8   CHLORIDE  --  104  --   --   --  103  --  105  --  104   CO2  --  25  --   --   --  27  --  28  --  28   ANIONGAP  --  10  --   --   --  8  --  8  --  10   * 135* 153* 142*   < > 112*   < > 106*   < > 103*   BUN  --  58.0*  --   --   --  47.3*  --  51.6*  --  51.2*   CR  --  1.54*  --   --   --  1.51*  --  1.63*  --  1.85*   GFRESTIMATED  --  47*  --   --   --  48*  --  44*  --  38*   SLOEDAD  --  7.8*  --   --   --  7.6*  --  7.8*  --  7.6*   MAG  --  2.2  --   --   --  2.2  --  2.2  --  2.3   PROTTOTAL  --  4.6*  --   --   --  4.5*  --  4.4*  --  4.6*   ALBUMIN  --  2.7*  --   --   --  2.6*  --  2.6*  --  2.7*   BILITOTAL  --  0.9  --   --   --  0.7  --  0.8  --  0.8   ALKPHOS  --  153*  --   --   --  141  --  137  --  125   AST  --  39  --   --   --  35  --  29  --  26   ALT  --  18  --   --   --  16  --  13  --  12    < > = values in this interval not displayed.     CBC  Recent Labs   Lab 11/08/24  0641 11/07/24  1747 11/07/24  0433 11/06/24  0512 11/05/24  0426   WBC 7.0  --  6.9 7.9 9.6   RBC 2.31*  --  2.41* 2.63* 2.69*   HGB 7.5* 8.6* 7.8* 8.6* 8.7*   HCT 24.3*  --  25.1* 27.0* 27.6*   *  --  104* 103* 103*   MCH 32.5  --  32.4 32.7 32.3   MCHC 30.9*  --  31.1* 31.9 31.5   RDW 20.9*  --  20.9* 21.3* 21.5*   PLT 46*  --  45* 39* 45*     BNPNo lab results found in last 7 days.  HsTropInvalid input(s): \"TROP\"      CXR:   Clear without focal opacities or congestion or effusion    ECHO:   Interpretation Summary  Global and regional left ventricular function is hyperkinetic with an EF of  65-70%.     Hypertrophic cardiomyoapthy with reverse septal curvature. The basal  anteroseptal segment is 2.5 cm. There is mid ventricular obstruction with a  mid-cavity LVOT gradient of ~81 mmHg based a MR peak velocity of 6.5 m/s and   mmHg.     Global right ventricular function is normal.     The inferior vena cava was " normal in size with preserved respiratory  variability.     Trivial pericardial effusion is present.

## 2024-11-08 NOTE — PLAN OF CARE
D: Gucci Logan is a 74 year old male with a history of HCM, HTN, metastatic prostate cancer, cirrhosis due to  chronic HCV infection s/p treatment, portal hypertension, ascites, CKD 3 , Diabetes mellitus type 2 ( HbA1c 5.8 on 10/29/24), chronic thrombocytopenia,  chronic anemia, high subtrochanteric fracture L proximal femur s/p open reduction, cerclage fixation fracture along with cephalomedullary Internal fixation with lag screw and interlocking screw on 5/20/24,  admitted on 10/29/24  for heart failure exacerbation, worsening fluid retention/ edema, ascites and bilateral lower abdominal pain.      I/A:   Neuro: A&O x4. [place]  Cardiac: HR 70s-80s. SR, denies chest pain and palpitations   Respiratory: Sats >92% on RA. Reports shortness of breath.  GI/: Regular diet. ACHS. BM this shift.   Skin/drains: No new deficits noted  IV/Drips: Port HL  Pain: Denies  Activity: x2 GBW     P: Continue with POC Notifying [facility] of changes.     Goal Outcome Evaluation:      Plan of Care Reviewed With: patient    Overall Patient Progress: no changeOverall Patient Progress: no change    Outcome Evaluation: Continues to report shortness of breath at rest, VSS

## 2024-11-08 NOTE — PROGRESS NOTES
[facility], [place]    Medicine Progress Note     Abbreviated version:     Gucci Logan is a 74 year old male with a past medical history of HCM w/ EDGAR, w/ valsalva LVOTO gradient, HFpEF, HTN, Metastatic Prostate Cancer, Decompensated Cirrhosis 2/2 HCV c/b ascites, SBP, CKD Stage III, T2DM, class II obesity. He was a direct admit 10/29/24 for escalation of his outpatient diuretic regimen ISO HCM, unable to make frequent outpatient labs and appts d/t mobility issues. Initially admitted to the Cardiology service, but hospital course c/b SBP after which he was transferred to the Medicine service on 10/31/24. Patient had initially been on a bumex infusion however after discussion with hepatology this was stopped due to concern for volume depletion. Repeat Para done on 11/2 with only slight decrease in cell count so abx switched from ceftriaxone to zosyn, cell count down on 11/6 re-tap, completed treatment on 11/7.       Dispo barriers:   [x] Final plan on SBP treatment - now on cipro ppx  [x] Diuretic plan - resumed bumex daily this AM  [] TCU acceptance  [] dyspnea    Skip to bottom of note for more detailed problem list    DVT Prophylaxis: deferred due to thrombocytopenia < 50  Rankin Catheter: Not present  Cardiac Monitoring: None  Code Status: Full Code      Diet: Regular Diet Adult  Snacks/Supplements Adult: Other; If pt would like a snack or suppelment, please send; With Meals  Snacks/Supplements Adult: [employer] Bar; Between Meals        Fracisco Wilkins DO  Hospitalist  Pager: [phone]  Available on rollApp    ______________________________________________________________________    Subjective    Canelo feels like his shortness of breath is worse over the last 24 hours - it doesn't occur at rest, just with activity. Last night I was messaged that he had positive orthostatics and was dyspneic with blood in his stool. I was sent a picture through [employer] - looked like scant BRB on top  of brown soft stool. He is still quite dyspneic with activity (not at rest).    Objective    Temp:  [97.7  F (36.5  C)-98.3  F (36.8  C)] 98.1  F (36.7  C)  Pulse:  [65-80] 71  Resp:  [15-23] 15  BP: (105-128)/(44-61) 115/57  SpO2:  [94 %-97 %] 96 %     Lying in bed in no distress  Awake, alert  Speech is clear and coherent  RR and WOB normal  HR normal  ++ body wall edema with pitting  Rectal exam with palpable hemorrhoids, no blood    Assessment & Plan   Shortness of Breath with Exertion  - high risk for DVT/PE given immobility, age, cancer diagnosis, but he's not tachycardic or hypoxemic and his Baldwin Criteria score is only 3 (8% risk); plt are low so DVT ppx has been on hold  - CXR ordered this morning by overnight provider looks more edematous and weight is up a bit relative to 2 days ago; resume bumex this morning  - if dyspnea fails to improve with diuresis can order CTA to rule out PE  - re-consulted cards 2 (HF)    Spontaneous Bacterial Peritonitis  Cirrhosis 2/2 HCV, Newly Decompensated, c/b ascites, SBP  Completed treatment with zosyn, now on ciprofloxacin ppx, see ID note from 11/7    Suspected Abdominal Wall Cellulitis per ID; CRP 16  On my exam the area is diffusely erythematous without warmth, induration due to body wall edema (clearly pitting)   MRSA nares pending, if negative will not treat given he would have been treated by Zosyn  If positive will require additional consideration for treatment     RENNY, suspect prerenal, improved but stalled out Cr around 1.5  CKD Stage III  Baseline Cr ~0.8-1 recently. On review of prior BMPs, appears that his Cr periodically rises during periods c/w intravascular depletion ISO.   - resume diuretics today  - urine sodium low and urine concentrated 11/5, Cr improved with holding diuretic     BRBPR 11/7  - likely hemorrhoidal given presence of internal hemorrhoids on rectal exam for me 11/8; hgb checked soon after was stable and vitals have shown consistent  stability    Hypomagnesemia   ISO RENNY as above. RN protocol for replacement.     HCM with EDGAR with Valsalva LVOTO Gradient  Acute-on-Chronic HFpEF  HTN  Longstanding hx of HCM, recently established care w/ [facility] for [facility]. Has been struggling w/ worsening BLE edema, increased abdominal distension (weeping ascites), from poorly controlled hypervolemia. PTA diuretic regimen w/ Spironolactone was deemed inadequate, and given his difficulties w/ mobility at home and need for frequent appts/labs w/ titration of OP diuresis, decision was made to directly admit him instead for more aggressive titration of diuretic regimen w/ close cardiac monitoring. Started on Bumex drip here by Cards. Repeat ECHO 10/30/24 showing hyperkinetic w/ EF 65-70%, dynamic outflow tract obstruction, peak gradient 119mmHg at rest, grade II moderate diastolic dysfunction. Transferred to Medicine service 10/31 given SBP.  - Cardiology hasn't dropped a note since 10/30 so I re-consulted them given his ongoing dyspnea              - Spironolactone and Bumex drip stopped 10/31                          - 1 mg bumex  given 11/4              - Hold PTA Valsartan 160mg daily w/ low BP, worsening LVOT gradient   - reduced PTA Metoprolol succinate 150mg daily to 125 mg daily   - Avoid vasodilators               - Daily standing weights (per pt, a 'good' weight for him is 230 lbs)     Metastatic Prostate Cancer  Chronic Pancytopenia   Initially diagnosed earlier this year incidentally following a fall at home, fracturing his L femur. ORIF 05/24/24 and curretage samples c/w adenocarcinoma of prostate origin. Has since followed w/ Oncology as an OP, last saw 10/24/24. PTA on Daralutamide BID, Docetaxel (C4D1 was on 10/24/24) and Leuprolide [id number] (last 9/5/24). Also gets Neulasta, last 10/25. Has chronic pancytopenia, likely d/t multiple comorbidities (cirrhosis, CKD) and iatrogenic (chemotherapy). Baseline hgb ~8-9 recently, and had drift today down to  6.9, but no e/o active bleeding.   - Oncology consulted here 10/31 and recommended to continue Daralutamide, they signed off  - Continue PTA Prednisone   - Transfuse for Hgb <7      Type 2 Diabetes Mellitus, non-insulin-dependent, well-controlled  Last A1c 5.8% on 10/29/24. PTA Metformin. BG checks past 24 hours are all within goal (<180).   - Hold PTA Metformin given RENNY as above  - Continue medium sliding scale as started on admission  - BG checks TID AC + at bedtime + 0200  - Hypoglycemia protocol     Diet: Snacks/Supplements Adult: Ensure Clear; Between Meals  Regular Diet Adult    DVT Prophylaxis: Pneumatic Compression Devices  Rankin Catheter: Not present  Lines: PRESENT      Port a Cath 08/12/24 Single Lumen Right-Site Assessment: WDL      Cardiac Monitoring: None  Code Status: Full Code      Billing    MDM: high  See problem list above  Reviewed CBC, metabolic panel, ID note, GI note, reconsulted Cards  high risk due to need for frequent lab checks, ongoing work up for dyspnea in presence of cirrhosis and HCM

## 2024-11-08 NOTE — PROGRESS NOTES
[facility] - GENERAL INFECTIOUS DISEASES PROGRESS NOTE     Patient:  Gucci Logan   Date of birth 1950, Medical record number [id number]  Date of Visit:  11/08/2024  Date of Admission: 10/29/2024  Consult Requested by:Fracisco Wilkins DO  Reason for Consult: SBP treatment recs, failed to respond to rocephin, slow to respond to zosyn, no bug          Assessment and Recommendations:     Gucci Logan is a 74 year old male with a history of HCM, HTN, metastatic prostate cancer, cirrhosis due to  chronic HCV infection s/p treatment, portal hypertension, ascites, CKD 3 , Diabetes mellitus type 2 ( HbA1c 5.8 on 10/29/24), chronic thrombocytopenia,  chronic anemia, high subtrochanteric fracture L proximal femur s/p open reduction, cerclage fixation fracture along with cephalomedullary Internal fixation with lag screw and interlocking screw on 5/20/24,  admitted on 10/29/24  for heart failure exacerbation, worsening fluid retention/ edema, ascites and bilateral lower abdominal pain.    ASSESSMENT:  Anasarca  Culture negative neutrocytic ascites ( infection vs malignancy)   Abdominal pain - lateral lower abdominal wall edema with mild cellulitis ( left lower abdomen)  HCM   Metastatic prostate cancer   liver cirrhosis   History of Hepatitis C s/p treatment , undetectable viral load in April 2024  Anemia  Thrombocytopenia  Diabetes mellitus ( HbA1c 5.8 on 10/29/24)  history of constipation   Leukocytosis - resolved  skin lesions ( sacral area/ legs)   Diarhhea - black stools    RECOMMENDATION:  - Patient complains of can stop Zosyn today. will need SBP prophylaxis with daily cipro to prevent future SBP (to be dosed per renal function)   - monitor abdominal wall cellulitis, likely causing his abdominal pain. otherwise no tenderness in other parts of his abdomen. Recommend Vancomycin IV if cellulitis worsens   - agree with diuresis   - MRSA screen - pending   - avoid scratching     ID will continue to  follow. Shared recommendations with primary team     Brandon Templeton MD, M.Med.Sc  Staff, Infectious Diseases     Interval History  no fever. complains of black watery stools. left sided abdominal pain. urine is dark brown per patient   complains of dyspnea but on room air, satting at 96% per RN          History of Present Illness:     Gucci Logan is a 74 year old male with a history of HCM, HTN, metastatic prostate cancer, cirrhosis due to  chronic HCV infection s/p treatment, portal hypertension, ascites, CKD 3 , Diabetes mellitus type 2 ( HbA1c 5.8 on 10/29/24), chroic thrombocytopenia,  chronic anemia, high subtrochanteric fracture L proximal femur s/p open reduction, cerclage fixation fracture along with cephalomedullary Internal fixation with lag screw and interlocking screw on 5/20/24,  admitted on 10/29/24  for heart failure exacerbation, worsening fluid retention/ edema, ascites and bilateral lower abdominal pain. no fever, chills.      He had paracentesis on 10/30/24 with 579 absolute neutrophils (79% Neutrophils) Culture - no growth . ceftriaxone was started   repeat paracentesis on 11/2/24 showed absolute nettrophils 558 (80% neutrophils) with negative culture. ceftriaxone was discontinued and Pip/Tazobactam was started   paracentesis on 11/6/24 with 452 absolute neutrophils (63% neutrophils) - culture is negative to date     Peripheral WBC has normalized. He has been afebrile. tolerates oral intake, has nausea but no vomiting    Antimicrobials:   Pip/Tazobactam 11/3- present   Ceftriaxone 10/30-11/2    Imaging:  CT chest/abdomen/pelvis w contrast 10/18/24  IMPRESSION:  In this patient with metastatic prostatic disease on treatment here for restaging/assessment of treatment response:     1.  Findings concerning for worsening metastatic disease with multiple new foci of radiotracer uptake within multiple pelvic lymph nodes and osseous foci, as above.  2.  Persistent increased  radiotracer uptake within multiple established metastatic pelvic, retroperitoneal (and likely subcarinal), adenopathy.  3.  Persistent increased radiotracer uptake within multiple osseous lesions seen on previous examination, as above.  4.  Cirrhotic liver morphology with sequela of portal venous hypertension, with increasing small to moderate volume diffuse  abdominopelvic ascites.   5.  Slight interval increase in splenomegaly, with overall increased background avidity of the spleen. Etiology of increasing avidity and  size may be due to recent administration of GCSF therapy. Correlate clinically.  6.  Again noted are multiple bilateral hypodense thyroid nodules, which can be further characterized with thyroid ultrasound when  clinically appropriate.  7.  Incidental CT findings, as above         Review of Systems:   CONSTITUTIONAL:  No fevers or chills  EYES: negative for icterus  ENT:  negative for hearing loss, tinnitus and sore throat  RESPIRATORY:  negative for cough with sputum and dyspnea  CARDIOVASCULAR: see HPI  GASTROINTESTINAL:  see HPI   GENITOURINARY:  negative for dysuria  HEME:  No easy bruising  INTEGUMENT:  see HPI  NEURO:  Negative for headache         Past Medical History:     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    Diverticular disease of colon 07/14/2015    Esophageal reflux 03/01/2014    Gastroesophageal reflux disease, esophagitis presence not specified 10/06/2016    IMO Regulatory Load OCT 2020       Glaucoma suspect of both eyes 04/30/2024    Hearing problem     Hiatal hernia     Hyperlipidemia LDL goal <100 04/19/2017    Hypertension     Hypertension goal BP (blood pressure) < 140/90 02/08/2013    Intertrochanteric fracture of left femur, closed, initial encounter (H) 05/19/2024    Jaundice 05/31/2008    Liver disease     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 Not keen to use it.     Stage 3 chronic kidney disease, unspecified whether stage 3a or 3b CKD (H) 01/23/2024    Syncope, unspecified syncope type 02/20/2023    Thrombocytopenia (H) 08/28/2008    Overview:   8/28/2008, attributed to liver disease with portal hypertension and functional splenomegaly            Past Surgical History:     Past Surgical History:   Procedure Laterality Date    ARTHROSCOPY KNEE RT/LT      (L) with partial medial meniscectomy    CATARACT IOL, RT/LT  Nov and Dec 2017    COLONOSCOPY N/A 4/24/2019    Procedure: COLONOSCOPY, WITH POLYPECTOMY AND BIOPSY;  Surgeon: Leventhal, Thomas Michael, MD;  Location: [facility]    COLONOSCOPY N/A 9/14/2022    Procedure: COLONOSCOPY;  Surgeon: Rafa Renee MD;  Location:  GI    DACRYOCYSTORHINOSTOMY Left 10/16/2018    Procedure: DACRYOCYSTORHINOSTOMY;  Surgeon: Madhu Krause MD;  Location: [facility]    ENDOSCOPIC ENDONASAL SURGERY  1994    ENDOSCOPY  2-19-15    ESOPHAGOSCOPY, GASTROSCOPY, DUODENOSCOPY (EGD), COMBINED N/A 4/24/2019    Procedure: COMBINED ESOPHAGOSCOPY, GASTROSCOPY, DUODENOSCOPY (EGD) - hold aspirin  ibuprofen or naproxen for one week prior (per physician order);  Surgeon: Leventhal, Thomas Michael, MD;  Location: [facility] OR    ESOPHAGOSCOPY, GASTROSCOPY, DUODENOSCOPY (EGD), COMBINED N/A 2023    Procedure: Esophagoscopy, gastroscopy, duodenoscopy, combined;  Surgeon: Rafa Renee MD;  Location: [facility]    EYE SURGERY      Hernia surgery Left     NASAL/SINUS POLYPECTOMY      OPEN REDUCTION INTERNAL FIXATION HIP NAILING Left 2024    Procedure: open reduction internal fixation hip nailing left;  Surgeon: Rafa Wagner MD;  Location: [facility] OR    REPAIR PTOSIS Left 10/16/2018    Procedure: LEFT UPPER LID PTOSIS AND BILATERAL  BROW PTOSIS REPAIR WITH LEFT DACRYOCYSTORHINOSTOMY ;  Surgeon: Madhu Krause MD;  Location: [facility]    REPAIR PTOSIS BROW Bilateral 10/16/2018    Procedure: REPAIR PTOSIS BROW;  Surgeon: Madhu Krause MD;  Location:  [facility]    ROTATOR CUFF REPAIR RT/LT Right     ZZC OPEN RX ANKLE DISLOCATN+FIXATN      (R)    ZZC SPINAL FUSION,ANT,EA ADNL LEVEL      T12 - L1          Family History:     Family History   Problem Relation Age of Onset    Alzheimer Disease Mother 85    Dementia Mother     Cerebrovascular Disease Father 50    Cancer Father     Hypertension Father     Neurologic Disorder No family hx of     Diabetes No family hx of           Social History:     Social History     Tobacco Use    Smoking status: Former     Current packs/day: 0.00     Types: Cigarettes     Start date: 1990     Quit date: 1999     Years since quittin.8     Passive exposure: Never    Smokeless tobacco: Never   Substance Use Topics    Alcohol use: Yes     Comment: rare     History   Sexual Activity    Sexual activity: Not Currently    Partners: Female     lives alone, no children          Current Medications (antimicrobials listed in bold):     Current Facility-Administered Medications   Medication Dose Route Frequency Provider Last Rate Last Admin    aspirin EC tablet 81 mg  81 mg Oral  Daily Lexi Crespo APRN CNP   81 mg at 11/08/24 0939    [START ON 11/9/2024] bumetanide (BUMEX) tablet 2 mg  2 mg Oral Daily Fracisco Wilkins,         calcium citrate (CITRACAL) tablet 950 mg  950 mg Oral Daily Lexi Crespo APRN CNP   950 mg at 11/08/24 0939    ciprofloxacin (CIPRO) tablet 500 mg  500 mg Oral Q24H [facility] Fracisco Wilkins,         darolutamide (NUBEQA) tablet 600 mg  600 mg Oral BID Lexi Crespo APRN CNP   600 mg at 11/07/24 2007    heparin lock flush 10 unit/mL injection 5-10 mL  5-10 mL Intracatheter Q24H Lexi Crespo APRN CNP   5 mL at 11/08/24 0700    heparin lock flush 100 unit/mL injection 5-10 mL  5-10 mL Intracatheter Q28 Days Lexi Crespo APRN CNP        insulin aspart (NovoLOG) injection (RAPID ACTING)  1-7 Units Subcutaneous TID AC Lexi Crespo APRN CNP   1 Units at 11/07/24 1751    insulin aspart (NovoLOG) injection (RAPID ACTING)  1-5 Units Subcutaneous At Bedtime Lexi Crespo APRN CNP        metoprolol succinate ER (TOPROL XL) 24 hr tablet 125 mg  125 mg Oral Daily Cornelio, Jordan, DO   125 mg at 11/08/24 0939    pantoprazole (PROTONIX) EC tablet 40 mg  40 mg Oral QAM AC Tigist Yepez MD   40 mg at 11/08/24 0939    polyethylene glycol (MIRALAX) Packet 17 g  17 g Oral BID Kuross, Jordan, DO   17 g at 11/06/24 0744    predniSONE (DELTASONE) tablet 5 mg  5 mg Oral Daily Lexi Crespo APRN CNP   5 mg at 11/08/24 0939    sodium chloride (PF) 0.9% PF flush 10-20 mL  10-20 mL Intracatheter Q28 Days Lexi Crespo APRN CNP   20 mL at 10/29/24 1714    sodium chloride (PF) 0.9% PF flush 3 mL  3 mL Intracatheter Q8H Lexi Crespo APRN CNP   3 mL at 11/08/24 0940    [Held by provider] spironolactone (ALDACTONE) tablet 50 mg  50 mg Oral Daily Lexi Crespo APRN CNP   50 mg at 10/31/24 0913    [Held by provider] valsartan (DIOVAN) tablet 160 mg  160 mg Oral Daily Lexi Crespo APRN CNP   160 mg at  10/30/24 0816          Allergies:     Allergies   Allergen Reactions    Bee Venom Swelling     Gum swelling    Haemophilus B Polysaccharide Vaccine Other (See Comments)     PN: delirium  fever    Haemophilus Influenzae Nausea and Other (See Comments)     PN: delirium  fever    Other Reaction(s): Fever    PN: delirium  fever   PN: delirium  fever    Pneumococcal Vaccine      Other Reaction(s): *Unknown, adverse reaction          Physical Exam:   Vitals were reviewed  Patient Vitals for the past 24 hrs:   BP Temp Temp src Pulse Resp SpO2 Weight   11/08/24 0840 118/52 97.8  F (36.6  C) Oral 72 20 96 % --   11/08/24 0700 -- -- -- -- -- -- 108.5 kg (239 lb 4.8 oz)   11/08/24 0400 115/57 98.1  F (36.7  C) Oral 71 15 96 % --   11/07/24 2340 105/57 98.2  F (36.8  C) Oral 75 17 94 % --   11/07/24 1906 -- 98.2  F (36.8  C) Oral 77 20 96 % --   11/07/24 1640 115/51 -- -- 73 15 -- --   11/07/24 1620 128/56 -- -- 65 16 97 % --   11/07/24 1610 107/61 -- -- 80 21 -- --   11/07/24 1601 106/44 98.3  F (36.8  C) Oral 74 23 96 % --   11/07/24 1055 -- 97.7  F (36.5  C) Oral -- -- -- --     Ranges for his vital signs:  Temp:  [97.7  F (36.5  C)-98.3  F (36.8  C)] 97.8  F (36.6  C)  Pulse:  [65-80] 72  Resp:  [15-23] 20  BP: (105-128)/(44-61) 118/52  SpO2:  [94 %-97 %] 96 %    Physical Examination:  GENERAL:  alert,oriented,  in chair in no acute distress. pale  HEENT:  Head is normocephalic, atraumatic   EYES:  Eyes have anicteric sclerae without conjunctival injection   ENT:  Oropharynx is moist without exudates or ulcers. Tongue is midline  NECK:  Supple. No  Cervical lymphadenopathy  LUNGS:  Clear to auscultation bilateral. decreased breath sounds in bases  CARDIOVASCULAR:  Regular rate and rhythm with 2/6 murmurs, .  ABDOMEN:  Normal bowel sounds, soft, distended. abdominal wall edematous, mild redness on left lower abdominal wall - spreading. bilateral lower wall edema and mild tendernss   SKIN:  Line(s) are in place without any  surrounding erythema or exudate. No stigmata of endocarditis.  EXTREMITIES: moderate edema bilateral legs   NEUROLOGIC:  alert, oriented          Laboratory Data:     Inflammatory Markers    Recent Labs   Lab Test 04/16/24  1349 08/15/22  1003   SED 25* 21*   CRP  --  7.9     Hematology Studies    Recent Labs   Lab Test 11/08/24  0641 11/07/24  1747 11/07/24  0433 11/06/24  0512 11/05/24  0426 11/04/24  0438 11/03/24  0428 09/05/24  1021 08/21/24  0634 08/20/24  0606 08/19/24  1714 07/21/21  1238 04/05/21  0950 01/12/21  0838 09/11/20  1619   WBC 7.0  --  6.9 7.9 9.6 11.3* 11.4*   < > 2.7* 1.4* 2.7*   < > 4.5 4.6 5.0   ANEU  --   --   --   --   --   --   --   --  2.1 1.0* 2.4  --  3.3 3.3 3.4   AEOS  --   --   --   --   --   --   --   --  0.0 0.0 0.0  --  0.1 0.1 0.1   HGB 7.5* 8.6* 7.8* 8.6* 8.7* 8.4* 8.0*   < > 9.1* 8.3* 9.7*   < > 14.1 14.0 14.0   *  --  104* 103* 103* 104* 103*   < > 96 97 96   < > 98 98 99   PLT 46*  --  45* 39* 45* 47* 47*   < > 45* 41* 46*   < > 63* 59* 84*    < > = values in this interval not displayed.     Immune Globulin Studies    Recent Labs   Lab Test 04/16/24  1349      IGM 26*        Metabolic Studies     Recent Labs   Lab Test 11/08/24  0641 11/07/24  0433 11/06/24  0512 11/05/24  0426 11/04/24  0438    138 141 142 140   POTASSIUM 4.0 3.5 3.5 3.8 4.1   CHLORIDE 104 103 105 104 104   CO2 25 27 28 28 28   BUN 58.0* 47.3* 51.6* 51.2* 51.9*   CR 1.54* 1.51* 1.63* 1.85* 1.72*   GFRESTIMATED 47* 48* 44* 38* 41*     Hepatic Studies    Recent Labs   Lab Test 11/08/24  0641 11/07/24  0433 11/06/24  0512 11/05/24  0426 11/04/24  0438 11/03/24  0428   BILITOTAL 0.9 0.7 0.8 0.8 0.7 0.7   ALKPHOS 153* 141 137 125 123 120   ALBUMIN 2.7* 2.6* 2.6* 2.7* 2.6* 2.8*   AST 39 35 29 26 25 24   ALT 18 16 13 12 11 11     Thyroid Studies    Recent Labs   Lab Test 07/03/24  1511 06/26/24  0709 04/16/24  1349 04/24/19  1020 08/10/18  0924   TSH 3.22 6.07* 5.38*   < > 4.77*   T4 1.35   --  1.13   < > 1.06   T3  --   --   --   --  104    < > = values in this interval not displayed.     Urine Studies    Recent Labs   Lab Test 11/05/24  2208 04/16/24  1407 02/21/23  0348 07/08/20  1721 11/27/17  1905   LEUKEST Negative Negative Negative Negative Negative   WBCU 1 0  --  0 0     Hepatitis B Testing   Recent Labs   Lab Test 04/16/24  1349   HBCAB Nonreactive   HEPBANG Nonreactive     Hepatitis C Testing     Hepatitis C Antibody   Date Value Ref Range Status   04/16/2024 Reactive (A) Nonreactive Final     Comment:     The detection of anti-HCV antibodies indicates a current HCV infection or past infection that has resolved, or false HCV antibody positivity.     The CDC recommends that a reactive result should be followed by Nucleic acid testing for HCV RNA. If HCV RNA is detected, that indicates current HCV infection.    If HCV RNA is not detected, that indicates either past, resolved HCV infection, or false HCV antibody positivity.

## 2024-11-08 NOTE — PROGRESS NOTES
GASTROENTEROLOGY PROGRESS NOTE    Date: 11/08/2024     ASSESSMENT:  Gucci Logan is a 74 year old male with a history significant for MASLD/HepC s/p IFN/Ribavarin/Boceprevir related compensated cirrhosis, Metastatic Prostate Ca s/p Rad now combination ADT/Docetaxel, HCM c/b HFpEF who was admitted for acute on chronic systolic heart failure and new onset ascites complicated by spontaneous bacterial peritonitis.        #BRBPR   Patient reports black, tarry stool but per nurse stool is brown w/ BRB; saw a picture- stool looks brown/green w/ small amount of dark red blood on top; HD stable, Hgb stable; last EGD in May showed PHG w/ no varices; most likely represents outlet bleeding    # New Ascites complicated by SBP   # RENNY (baseline Cr 1)  Patient with new onset ascites; diagnostic fluid studies consistent with portal hypertensive ascites 2/2 cirrhosis and cell ct c.w. SBP. Ceftriaxone 2 g daily started 10/30/24, repeat para also positive 11/2, switched abx to Zosyn; repeat para 11/6 was improved (580->558->452); Cr on admit 1.69, now 1.54     #. Decompensated Cirrhosis   Primary Hepatologist: Dr. Renteria   Etiology: HCV/MASLD   Ascites: + clinically.   - hold 50mg spironolactone, 2mg Bumex   - SBP history: + hx (dx 10/30/24) , repeat para also positive 11/2, switched abx to Zosyn; repeat para 11/6 was improved (580->558->452); completed 5 days of Zosyn per ID on ; will need ppx   Hepatic encephalopathy: PTA lactulose and rifaximin  EV: EGD 5/2023 without varices   TIPS: n/a   PV: Patent on US 8/19/24   HCC: CT A/P 10/18/24 without concerning lesions      MELD 3.0: 11 at 8/21/2024  6:34 AM  MELD-Na: 10 at 8/21/2024  6:34 AM  Calculated from:  Serum Creatinine: 1.04 mg/dL at 8/21/2024  6:34 AM  Serum Sodium: 140 mmol/L (Using max of 137 mmol/L) at 8/21/2024  6:34 AM  Total Bilirubin: 1.1 mg/dL at 8/20/2024  6:06 AM  Serum Albumin: 2.7 g/dL at 8/20/2024  6:06 AM  INR(ratio): 1.32 at 8/19/2024  5:14 PM  Age at  "listing (hypothetical): 74 years  Sex: Male at 8/21/2024  6:34 AM      RECOMMENDATIONS:  - Appreciate ID consult, start cipro ppx  - Orthostatic vitals +, hold diuretics     Thank you for involving us in this patient's care. Please do not hesitate to contact the GI service with any questions or concerns.      Pt care plan discussed with Dr. Keating, GI staff physician.    Amie Severino   Gastroenterology Fellow  _______________________________________________________________    Subjective: orthostatic vitals positive; patient reports \"very black, tarry\" diarrhea w/ BRB, although per nurse it is brown w/ some BRB; feels his abd is distended; reports SOB w/ exertion and was incontinent of stool in his bed x1 bc of this    Objective:  Blood pressure 115/57, pulse 71, temperature 98.1  F (36.7  C), temperature source Oral, resp. rate 15, weight 108.5 kg (239 lb 4.8 oz), SpO2 96%.    General: NAD    Lungs: on RA, comfortable  Abdomen: soft, distended, NT  Extremities: no edema, erythematous rash bl anterior legs  Musculoskeletal: No gross deformity.  Skin: No jaundice   Mental status/Psych: A&O. Asks/answers questions appropriately. Pleasant, interactive     LABS:  BMP  Recent Labs   Lab 11/08/24  0641 11/07/24  2200 11/07/24  1751 11/07/24  1053 11/07/24  0738 11/07/24  0433 11/06/24  0749 11/06/24  0512 11/05/24  0755 11/05/24  0426     --   --   --   --  138  --  141  --  142   POTASSIUM 4.0  --   --   --   --  3.5  --  3.5  --  3.8   CHLORIDE 104  --   --   --   --  103  --  105  --  104   SOLEDAD 7.8*  --   --   --   --  7.6*  --  7.8*  --  7.6*   CO2 25  --   --   --   --  27  --  28  --  28   BUN 58.0*  --   --   --   --  47.3*  --  51.6*  --  51.2*   CR 1.54*  --   --   --   --  1.51*  --  1.63*  --  1.85*   * 153* 142* 195*   < > 112*   < > 106*   < > 103*    < > = values in this interval not displayed.     CBC  Recent Labs   Lab 11/08/24  0641 11/07/24  1747 11/07/24  0433 11/06/24  0512 11/05/24  0426 "   WBC 7.0  --  6.9 7.9 9.6   RBC 2.31*  --  2.41* 2.63* 2.69*   HGB 7.5*   < > 7.8* 8.6* 8.7*   HCT 24.3*  --  25.1* 27.0* 27.6*   *  --  104* 103* 103*   MCH 32.5  --  32.4 32.7 32.3   MCHC 30.9*  --  31.1* 31.9 31.5   RDW 20.9*  --  20.9* 21.3* 21.5*   PLT 46*  --  45* 39* 45*    < > = values in this interval not displayed.     INRNo lab results found in last 7 days.  LFTs  Recent Labs   Lab 11/08/24  0641 11/07/24  0433 11/06/24  0512 11/05/24  0426   ALKPHOS 153* 141 137 125   AST 39 35 29 26   ALT 18 16 13 12   BILITOTAL 0.9 0.7 0.8 0.8   PROTTOTAL 4.6* 4.5* 4.4* 4.6*   ALBUMIN 2.7* 2.6* 2.6* 2.7*      PANCNo lab results found in last 7 days.

## 2024-11-08 NOTE — PROGRESS NOTES
11/8/2024  6:01 AM    HOSPITAL MEDICINE NOTE:  I was contacted through -SIPphone messaging / paging system.    Communication:   Bert Mccabe RN via secure text / pager @ ~ 6:01 AM    Issue  NEW AND/OR CHANGING SYMPTOM(S) - increased short(ness) of breath more than his normal and feeling more paranoid about his breathing.  Difficulty drawing labs from [facility].    Chart reviewed for pertinent details as below:  Past Medical History / Problem Lists, recent progress notes, and recent labs    Subj: 74 year old male with a past medical history of HCM w/ EDGAR, w/ valsalva LVOTO gradient, HFpEF, HTN, Metastatic Prostate Cancer, Decompensated Cirrhosis 2/2 HCV c/b ascites, SBP, CKD Stage III, T2DM, class II obesity.     Data / /57 (BP Location: Left arm, Cuff Size: Adult Regular)   Pulse 75   Temp 98.2  F (36.8  C) (Oral)   Resp 17   Wt 107.2 kg (236 lb 4.8 oz)   SpO2 94%   BMI 33.91 kg/m    Not otherwise examined.    Assessment:  CXR ordered - could be any number of things.    Plan:  Continue close observation and monitoring for now  TPA for portacat    Da Blackwell MD  [phone] pager  [phone] mobile/cell

## 2024-11-09 ENCOUNTER — APPOINTMENT (OUTPATIENT)
Dept: OCCUPATIONAL THERAPY | Facility: CLINIC | Age: 74
DRG: 291 | End: 2024-11-09
Attending: INTERNAL MEDICINE
Payer: COMMERCIAL

## 2024-11-09 LAB
% LINING CELLS, BODY FLUID: 1 %
ABSOLUTE NEUTROPHILS, BODY FLUID: 103.7 /UL
ALBUMIN SERPL BCG-MCNC: 2.7 G/DL (ref 3.5–5.2)
ALP SERPL-CCNC: 161 U/L (ref 40–150)
ALT SERPL W P-5'-P-CCNC: 22 U/L (ref 0–70)
ANION GAP SERPL CALCULATED.3IONS-SCNC: 11 MMOL/L (ref 7–15)
APPEARANCE FLD: ABNORMAL
AST SERPL W P-5'-P-CCNC: 41 U/L (ref 0–45)
BILIRUB DIRECT SERPL-MCNC: 0.24 MG/DL (ref 0–0.3)
BILIRUB SERPL-MCNC: 0.8 MG/DL
BUN SERPL-MCNC: 62.5 MG/DL (ref 8–23)
CALCIUM SERPL-MCNC: 7.9 MG/DL (ref 8.8–10.4)
CELL COUNT BODY FLUID SOURCE: ABNORMAL
CHLORIDE SERPL-SCNC: 103 MMOL/L (ref 98–107)
COLOR FLD: ABNORMAL
CREAT SERPL-MCNC: 1.57 MG/DL (ref 0.67–1.17)
EGFRCR SERPLBLD CKD-EPI 2021: 46 ML/MIN/1.73M2
EOSINOPHIL NFR FLD MANUAL: 1 %
ERYTHROCYTE [DISTWIDTH] IN BLOOD BY AUTOMATED COUNT: 21.1 % (ref 10–15)
GLUCOSE BLDC GLUCOMTR-MCNC: 142 MG/DL (ref 70–99)
GLUCOSE BLDC GLUCOMTR-MCNC: 155 MG/DL (ref 70–99)
GLUCOSE BLDC GLUCOMTR-MCNC: 164 MG/DL (ref 70–99)
GLUCOSE BLDC GLUCOMTR-MCNC: 220 MG/DL (ref 70–99)
GLUCOSE SERPL-MCNC: 132 MG/DL (ref 70–99)
HCO3 SERPL-SCNC: 25 MMOL/L (ref 22–29)
HCT VFR BLD AUTO: 23.3 % (ref 40–53)
HGB BLD-MCNC: 7.3 G/DL (ref 13.3–17.7)
LYMPHOCYTES NFR FLD MANUAL: 27 %
MAGNESIUM SERPL-MCNC: 2.1 MG/DL (ref 1.7–2.3)
MCH RBC QN AUTO: 32.3 PG (ref 26.5–33)
MCHC RBC AUTO-ENTMCNC: 31.3 G/DL (ref 31.5–36.5)
MCV RBC AUTO: 103 FL (ref 78–100)
MONOS+MACROS NFR FLD MANUAL: 25 %
NEUTS BAND NFR FLD MANUAL: 46 %
PLATELET # BLD AUTO: 49 10E3/UL (ref 150–450)
POTASSIUM SERPL-SCNC: 3.5 MMOL/L (ref 3.4–5.3)
PROT SERPL-MCNC: 4.7 G/DL (ref 6.4–8.3)
RBC # BLD AUTO: 2.26 10E6/UL (ref 4.4–5.9)
SODIUM SERPL-SCNC: 139 MMOL/L (ref 135–145)
WBC # BLD AUTO: 7.6 10E3/UL (ref 4–11)
WBC # FLD AUTO: 225 /UL

## 2024-11-09 PROCEDURE — 250N000013 HC RX MED GY IP 250 OP 250 PS 637: Performed by: NURSE PRACTITIONER

## 2024-11-09 PROCEDURE — 89050 BODY FLUID CELL COUNT: CPT | Performed by: PEDIATRICS

## 2024-11-09 PROCEDURE — 97140 MANUAL THERAPY 1/> REGIONS: CPT | Mod: GO | Performed by: OCCUPATIONAL THERAPIST

## 2024-11-09 PROCEDURE — 80053 COMPREHEN METABOLIC PANEL: CPT | Performed by: STUDENT IN AN ORGANIZED HEALTH CARE EDUCATION/TRAINING PROGRAM

## 2024-11-09 PROCEDURE — 250N000013 HC RX MED GY IP 250 OP 250 PS 637: Performed by: STUDENT IN AN ORGANIZED HEALTH CARE EDUCATION/TRAINING PROGRAM

## 2024-11-09 PROCEDURE — 89051 BODY FLUID CELL COUNT: CPT | Performed by: PEDIATRICS

## 2024-11-09 PROCEDURE — 250N000012 HC RX MED GY IP 250 OP 636 PS 637: Performed by: NURSE PRACTITIONER

## 2024-11-09 PROCEDURE — 250N000013 HC RX MED GY IP 250 OP 250 PS 637: Performed by: INTERNAL MEDICINE

## 2024-11-09 PROCEDURE — 85041 AUTOMATED RBC COUNT: CPT | Performed by: NURSE PRACTITIONER

## 2024-11-09 PROCEDURE — 82248 BILIRUBIN DIRECT: CPT | Performed by: NURSE PRACTITIONER

## 2024-11-09 PROCEDURE — 49083 ABD PARACENTESIS W/IMAGING: CPT | Performed by: STUDENT IN AN ORGANIZED HEALTH CARE EDUCATION/TRAINING PROGRAM

## 2024-11-09 PROCEDURE — 80048 BASIC METABOLIC PNL TOTAL CA: CPT | Performed by: STUDENT IN AN ORGANIZED HEALTH CARE EDUCATION/TRAINING PROGRAM

## 2024-11-09 PROCEDURE — 99233 SBSQ HOSP IP/OBS HIGH 50: CPT | Performed by: PEDIATRICS

## 2024-11-09 PROCEDURE — 250N000011 HC RX IP 250 OP 636: Performed by: NURSE PRACTITIONER

## 2024-11-09 PROCEDURE — 120N000005 HC R&B MS OVERFLOW UMMC

## 2024-11-09 PROCEDURE — 85014 HEMATOCRIT: CPT | Performed by: NURSE PRACTITIONER

## 2024-11-09 PROCEDURE — 250N000013 HC RX MED GY IP 250 OP 250 PS 637: Performed by: PEDIATRICS

## 2024-11-09 PROCEDURE — 99233 SBSQ HOSP IP/OBS HIGH 50: CPT | Mod: GC | Performed by: STUDENT IN AN ORGANIZED HEALTH CARE EDUCATION/TRAINING PROGRAM

## 2024-11-09 PROCEDURE — 83735 ASSAY OF MAGNESIUM: CPT | Performed by: INTERNAL MEDICINE

## 2024-11-09 PROCEDURE — 0W9G3ZZ DRAINAGE OF PERITONEAL CAVITY, PERCUTANEOUS APPROACH: ICD-10-PCS | Performed by: STUDENT IN AN ORGANIZED HEALTH CARE EDUCATION/TRAINING PROGRAM

## 2024-11-09 PROCEDURE — 82040 ASSAY OF SERUM ALBUMIN: CPT | Performed by: NURSE PRACTITIONER

## 2024-11-09 RX ORDER — METOPROLOL SUCCINATE 50 MG/1
200 TABLET, EXTENDED RELEASE ORAL DAILY
Status: DISCONTINUED | OUTPATIENT
Start: 2024-11-10 | End: 2024-11-16 | Stop reason: HOSPADM

## 2024-11-09 RX ORDER — POTASSIUM CHLORIDE 750 MG/1
20 TABLET, EXTENDED RELEASE ORAL ONCE
Status: COMPLETED | OUTPATIENT
Start: 2024-11-09 | End: 2024-11-09

## 2024-11-09 RX ORDER — METOPROLOL SUCCINATE 50 MG/1
50 TABLET, EXTENDED RELEASE ORAL ONCE
Status: COMPLETED | OUTPATIENT
Start: 2024-11-09 | End: 2024-11-09

## 2024-11-09 RX ADMIN — Medication 950 MG: at 08:17

## 2024-11-09 RX ADMIN — PREDNISONE 5 MG: 5 TABLET ORAL at 08:21

## 2024-11-09 RX ADMIN — INSULIN ASPART 1 UNITS: 100 INJECTION, SOLUTION INTRAVENOUS; SUBCUTANEOUS at 09:03

## 2024-11-09 RX ADMIN — PANTOPRAZOLE SODIUM 40 MG: 40 TABLET, DELAYED RELEASE ORAL at 08:21

## 2024-11-09 RX ADMIN — CIPROFLOXACIN 500 MG: 500 TABLET ORAL at 08:22

## 2024-11-09 RX ADMIN — INSULIN ASPART 1 UNITS: 100 INJECTION, SOLUTION INTRAVENOUS; SUBCUTANEOUS at 11:42

## 2024-11-09 RX ADMIN — ACETAMINOPHEN 650 MG: 325 TABLET, FILM COATED ORAL at 23:14

## 2024-11-09 RX ADMIN — INSULIN ASPART 1 UNITS: 100 INJECTION, SOLUTION INTRAVENOUS; SUBCUTANEOUS at 17:49

## 2024-11-09 RX ADMIN — HEPARIN, PORCINE (PF) 10 UNIT/ML INTRAVENOUS SYRINGE 5 ML: at 15:18

## 2024-11-09 RX ADMIN — HEPARIN, PORCINE (PF) 10 UNIT/ML INTRAVENOUS SYRINGE 5 ML: at 04:20

## 2024-11-09 RX ADMIN — OXYCODONE HYDROCHLORIDE 5 MG: 5 TABLET ORAL at 23:14

## 2024-11-09 RX ADMIN — POTASSIUM CHLORIDE 20 MEQ: 750 TABLET, EXTENDED RELEASE ORAL at 17:49

## 2024-11-09 RX ADMIN — ASPIRIN 81 MG CHEWABLE TABLET 81 MG: 81 TABLET CHEWABLE at 08:21

## 2024-11-09 RX ADMIN — METOPROLOL SUCCINATE 50 MG: 50 TABLET, EXTENDED RELEASE ORAL at 12:44

## 2024-11-09 RX ADMIN — METOPROLOL SUCCINATE 150 MG: 50 TABLET, EXTENDED RELEASE ORAL at 08:18

## 2024-11-09 NOTE — PROCEDURES
[facility]  CAPS PROCEDURE NOTE  Date of Admission:  10/29/2024  Consult Requested by: Medicine  Reason for Consult: diagnostic evaluation of ascites and management of symptomatic ascites    Indication/HPI: Ascites    Pre-Procedure Diagnosis: Ascites    Post-Procedure Diagnosis: Ascites    Risk Assessment: Average risk, Technically straightforward; patient's anticoagulation has been held according to guidelines based on the agent and platelets and coags are within guidelines. Thrombocytopenia present but >30,000 threshold. On aspirin, not therapeutically anticoagulated.     Procedure Outcome:  Diagnostic paracentesis performed and Therapeutic paracentesis performed with 2 liters of ascites removed. Discussed with primary team. Decision to add on cell count and differential given serosanguinous fluid.  See additional procedure details below.    The primary covering service should follow up and address any lab results as appropriate.    Zahida Nickerson DO  [facility]  Securely message with Vocera (more info)  Text page via [facility] Paging/Directory   See signed in provider for up to date coverage information      [facility]    Paracentesis    Date/Time: 11/9/2024 11:44 AM    Performed by: Zahida Nickerson DO  Authorized by: Zahida Nickerson DO    PRE-PROCEDURE DETAILS  Procedure purpose: diagnostic and therapeutic  Indications: abdominal discomfort secondary to ascites        UNIVERSAL PROTOCOL   Site Marked: Yes  Prior Images Obtained and Reviewed:  Yes  Required items: Required blood products, implants, devices and special equipment available    Patient identity confirmed:  Verbally with patient, arm band, provided demographic data and hospital-assigned identification number  NA - No sedation, light sedation, or local anesthesia  Confirmation Checklist:  Patient's identity  using two indicators, relevant allergies, procedure was appropriate and matched the consent or emergent situation and correct equipment/implants were available  Time out: Immediately prior to the procedure a time out was called    Universal Protocol: the Joint Commission Universal Protocol was followed    Preparation: Patient was prepped and draped in usual sterile fashion       ANESTHESIA    Anesthesia:  Local infiltration  Local Anesthetic:  Lidocaine 1% without epinephrine  Anesthetic Total (mL):  5      SEDATION    Patient Sedated: No    POST PROCEDURE DETAILS  Needle gauge: 19.  Ultrasound guidance: yes  Puncture site: left lower quadrant  Fluid removed: 2000(ml)  Fluid appearance: serosanguinous and bloody  Dressing: Sterile Bandage.        PROCEDURE    Patient Tolerance:  Patient tolerated the procedure well with no immediate complications  Length of time physician/provider present for 1:1 monitoring during sedation: 0        POC US GUIDE FOR PARACENTESIS     Impression  US Indication: abdominal distension    Limited abdominal ultrasound was performed and demonstrated an adequate fluid collection on the left side of the abdomen.    Doppler of the skin demonstrated an area at this site without significant vasculature.  A paracentesis at this site was subsequently performed.    Zahida Nickerson DO

## 2024-11-09 NOTE — PROGRESS NOTES
Cardiology Consult Note     Date of Service: 11/09/2024  Patient: Gucci Logan  MRN: [id number]  Admission Date: 10/29/2024  Hospital Day: 11    Reason for Consult: HCM    Assessment and Plan:   Gucci Logan is a 74 year old male with a history of hypertrophic obstructive cardiomyopathy, metastatic prostate cancer on active chemotherapy, and HCV cirrhosis who is admitted on 10/29 for decompensated heart failure and whose hospital course has been prolonged in the setting of decompensated cirrhosis and SBP. Cardiology has been re-consulted due to persistent dyspnea and lightheadedness limiting his current disposition options.       Hypertrophic Obstructive Cardiomyopathy: Patient with known HOCM with reverse septal curvature. Prior to hospitalization his resting gradient was 19 mmHg and with valsalva this increased to 72 mmHg. He has been maintained on metoprolol succinate 150mmHg, valsartan 160mg daily, and bumex 2mg daily at home. He has had a complicated admission here with intermittent diuresis and several hospital acquired infections. Repeat echocardiogram today with difficult to assess gradients given also sampling MR though is ~81 mmHg estimated off of underlying MR peak velocity. I suspect his ongoing dyspnea is multifactorial though at least somewhat related to his LVOT gradient though there may also be contributing factors related to his ascites. Ongoing treatment for relief of his LVOT gradient as below:    Brief Recommendations:  - Increase Metoprolol succinate to 200 mg daily; would add Metoprolol succinate 50 mg po today to achieve same dose.  - continue to hold Valsartan  - hold further diuresis for 1-2 days  - agree with consideration of CT PE or V/Q    Patient was seen and plan of care was discussed with attending physician Dr. Long.   We will continue to follow this patient.   Please don't hesitate to contact our team with any additional questions or concerns.    Slade Almanzar  Myrna SUTTON PhD  Cardiology fellow   PGY5    Subjective   History of Present Illness:    Gucci Logan is a 74 year old male with a history of hypertrophic obstructive cardiomyopathy, metastatic prostate cancer on active chemotherapy, and HCV cirrhosis who is admitted on 10/29 for decompensated heart failure and whose hospital course has been prolonged in the setting of decompensated cirrhosis and SBP. Cardiology has been re-consulted due to persistent dyspnea and lightheadedness limiting his current disposition options.     On interview with the patient, he reports doing poorly overall. For the past three days he has noted progressive dyspnea with minimal exertion. He describes this as becoming profoundly winded going from even the bathroom to his bed. He denies associated chest pain or presyncopal symptoms. He was not experiencing this prior to admission. He has not had symptoms similar to this before. He is uncertain why this is occurring. He notes he had a paracentesis a few days ago which didn't improve his symptoms. His beta blocker dose was reduced just prior to symptom onset.     Since last interview, he remains severely symptomatic while walking short distances from his chair to the bed or to the bathroom.        Review of Systems:  A comprehensive ROS was performed and found to be negative or non-contributory with the exception of that noted in the HPI above.    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     Diverticular disease of colon 07/14/2015    Esophageal reflux 03/01/2014    Gastroesophageal reflux disease, esophagitis presence not specified 10/06/2016    IMO Regulatory Load OCT 2020      Glaucoma suspect of both eyes 04/30/2024    Hearing problem     Hiatal hernia     Hyperlipidemia LDL goal <100 04/19/2017    Hypertension     Hypertension goal BP (blood pressure) < 140/90 02/08/2013    Intertrochanteric fracture of left femur, closed, initial encounter (H) 05/19/2024    Jaundice 05/31/2008    Liver disease     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 Not keen to use it.     Stage 3 chronic kidney disease, unspecified whether stage 3a or 3b CKD (H) 01/23/2024    Syncope, unspecified syncope type 02/20/2023    Thrombocytopenia (H) 08/28/2008    Overview:   8/28/2008, attributed to liver disease with portal hypertension and functional splenomegaly       Past Surgical History:   Procedure Laterality Date    ARTHROSCOPY KNEE RT/LT      (L) with partial medial meniscectomy    CATARACT IOL, RT/LT  Nov and Dec 2017    COLONOSCOPY N/A 4/24/2019    Procedure: COLONOSCOPY, WITH POLYPECTOMY AND BIOPSY;  Surgeon: Leventhal, Thomas Michael, MD;  Location: [facility] OR    COLONOSCOPY N/A 9/14/2022    Procedure: COLONOSCOPY;  Surgeon: Rafa Renee MD;  Location:  GI    DACRYOCYSTORHINOSTOMY Left 10/16/2018    Procedure: DACRYOCYSTORHINOSTOMY;  Surgeon: Madhu Krause MD;  Location: [facility]    ENDOSCOPIC ENDONASAL SURGERY  1994    ENDOSCOPY  2-19-15     ESOPHAGOSCOPY, GASTROSCOPY, DUODENOSCOPY (EGD), COMBINED N/A 2019    Procedure: COMBINED ESOPHAGOSCOPY, GASTROSCOPY, DUODENOSCOPY (EGD) - hold aspirin ibuprofen or naproxen for one week prior (per physician order);  Surgeon: Leventhal, Thomas Michael, MD;  Location: [facility] OR    ESOPHAGOSCOPY, GASTROSCOPY, DUODENOSCOPY (EGD), COMBINED N/A 2023    Procedure: Esophagoscopy, gastroscopy, duodenoscopy, combined;  Surgeon: Rafa Renee MD;  Location: [facility] GI    EYE SURGERY      Hernia surgery Left     NASAL/SINUS POLYPECTOMY      OPEN REDUCTION INTERNAL FIXATION HIP NAILING Left 2024    Procedure: open reduction internal fixation hip nailing left;  Surgeon: Rafa Wagner MD;  Location: [facility] OR    REPAIR PTOSIS Left 10/16/2018    Procedure: LEFT UPPER LID PTOSIS AND BILATERAL  BROW PTOSIS REPAIR WITH LEFT DACRYOCYSTORHINOSTOMY ;  Surgeon: Madhu Krause MD;  Location: [facility]    REPAIR PTOSIS BROW Bilateral 10/16/2018    Procedure: REPAIR PTOSIS BROW;  Surgeon: Madhu Krause MD;  Location: [facility]    ROTATOR CUFF REPAIR RT/LT Right     ZZC OPEN RX ANKLE DISLOCATN+FIXATN      (R)    ZZC SPINAL FUSION,ANT,EA ADNL LEVEL      T12 - L1     Social History     Socioeconomic History    Marital status: Single    Number of children: 0    Years of education: 18   Occupational History    Occupation: Contractor     Employer: [employer]     Comment: Not working now   Tobacco Use    Smoking status: Former     Current packs/day: 0.00     Types: Cigarettes     Start date: 1990     Quit date: 1999     Years since quittin.8     Passive exposure: Never    Smokeless tobacco: Never   Vaping Use    Vaping status: Never Used   Substance and Sexual Activity    Alcohol use: Yes     Comment: rare    Drug use: No    Sexual activity: Not Currently     Partners: Female   Other Topics Concern    Parent/sibling w/ CABG, MI or angioplasty before 65F 55M? No     Social Drivers of Health     Financial Resource Strain:  Low Risk  (10/29/2024)    Financial Resource Strain     Within the past 12 months, have you or your family members you live with been unable to get utilities (heat, electricity) when it was really needed?: No   Food Insecurity: Low Risk  (10/29/2024)    Food Insecurity     Within the past 12 months, did you worry that your food would run out before you got money to buy more?: No     Within the past 12 months, did the food you bought just not last and you didn t have money to get more?: No   Transportation Needs: Low Risk  (10/29/2024)    Transportation Needs     Within the past 12 months, has lack of transportation kept you from medical appointments, getting your medicines, non-medical meetings or appointments, work, or from getting things that you need?: No   Social Connections: Socially Integrated (8/6/2024)    Received from [facility]    Social Connections     Do you often feel lonely or isolated from those around you?: 0   Interpersonal Safety: Low Risk  (10/29/2024)    Interpersonal Safety     Do you feel physically and emotionally safe where you currently live?: Yes     Within the past 12 months, have you been hit, slapped, kicked or otherwise physically hurt by someone?: No     Within the past 12 months, have you been humiliated or emotionally abused in other ways by your partner or ex-partner?: No   Housing Stability: Low Risk  (10/29/2024)    Housing Stability     Do you have housing? : Yes     Are you worried about losing your housing?: No      Family History   Problem Relation Age of Onset    Alzheimer Disease Mother 85    Dementia Mother     Cerebrovascular Disease Father 50    Cancer Father     Hypertension Father     Neurologic Disorder No family hx of     Diabetes No family hx of      Medications Prior to Admission   Medication Sig Dispense Refill Last Dose/Taking    acetaminophen (TYLENOL) 325 MG tablet Take 2 tablets (650 mg) by mouth every 4 hours as needed for  other (For optimal non-opioid multimodal pain management to improve pain control.) 100 tablet 0 Past Week    aspirin 81 MG EC tablet Take 1 tablet (81 mg) by mouth daily 90 tablet 3 10/29/2024    bumetanide (BUMEX) 2 MG tablet Take 1 tablet (2 mg) by mouth daily. 30 tablet 0 10/29/2024    calcium citrate (CITRACAL) 950 (200 Ca) MG tablet Take 1 tablet (950 mg) by mouth daily 120 tablet 3 10/29/2024    darolutamide (NUBEQA) 300 MG tablet Take 2 tablets (600 mg) by mouth 2 times daily for 21 days. . Swallow tablets whole. Take with food. Avoid grapefruit or grapefruit juice. 84 tablet 0 10/29/2024 at  8:00 AM    diclofenac (VOLTAREN) 1 % topical gel Apply 4 g topically 3 times daily as needed for moderate pain (bursitis) 150 g 1 Past Month    metFORMIN (GLUCOPHAGE) 500 MG tablet Take 1 tablet (500 mg) by mouth 2 times daily (with meals). 180 tablet 1 10/29/2024    methocarbamol (ROBAXIN) 500 MG tablet Take 1 Tablet (500 mg) by mouth every 6 hours if needed for Muscle Spasm PO 1st choice.   More than a month    metoprolol succinate ER (TOPROL XL) 100 MG 24 hr tablet 1 1/2 ( 150 mg ) po daily 135 tablet 1 10/29/2024    multivitamin w/minerals (THERA-VIT-M) tablet Take 1 tablet by mouth daily   10/29/2024    omeprazole (PRILOSEC) 20 MG DR capsule TAKE 1 CAPSULE BY MOUTH ONCE DAILY 30 TO 60 MINUTES BEFORE A MEAL 90 capsule 1 10/29/2024    oxyCODONE (ROXICODONE) 5 MG tablet Take 1-2 tablets (5-10 mg) by mouth every 6 hours as needed for pain. 70 tablet 0 Past Week    predniSONE (DELTASONE) 5 MG tablet Take 1 tablet (5 mg) by mouth daily (with breakfast) Do not take prednisone on days when taking dexamethasone. 60 tablet 1 10/29/2024    tiZANidine (ZANAFLEX) 4 MG tablet TAKE 1/2 (ONE-HALF) TO 1  TABLET BY MOUTH UP TO THREE TIMES DAILY AS NEEDED FOR MUSCLE PAIN 40 tablet 0 More than a month    traMADol (ULTRAM) 50 MG tablet Take 50 mg by mouth daily as needed for severe pain.   Past Week    valsartan (DIOVAN) 160 MG tablet  Take 1 tablet (160 mg) by mouth daily 90 tablet 1 10/29/2024    vitamin D3 (CHOLECALCIFEROL) 50 mcg (2000 units) tablet Take 1 tablet (50 mcg) by mouth daily 120 tablet 3 10/29/2024     Current Facility-Administered Medications   Medication Dose Route Frequency Provider Last Rate Last Admin    acetaminophen (TYLENOL) Suppository 650 mg  650 mg Rectal Q4H PRN Lexi Crespo APRN CNP        acetaminophen (TYLENOL) tablet 650 mg  650 mg Oral Q4H PRN Lexi Crespo APRN CNP   650 mg at 11/07/24 2007    alteplase (CATHFLO ACTIVASE) injection 1-2 mg  1-2 mg Intravenous Q2H PRN Cruz Blackwell MD   2 mg at 11/08/24 0544    alum & mag hydroxide-simethicone (MAALOX) suspension 30 mL  30 mL Oral Q4H PRN Lexi Crespo APRN CNP        aspirin EC tablet 81 mg  81 mg Oral Daily Lexi Crespo APRN CNP   81 mg at 11/09/24 0821    [Held by provider] bumetanide (BUMEX) tablet 2 mg  2 mg Oral Daily Fracisco Wilkins,         calcium carbonate (TUMS) chewable tablet 500 mg  500 mg Oral Daily PRN Louis Hanson PA-C   500 mg at 11/05/24 2215    calcium citrate (CITRACAL) tablet 950 mg  950 mg Oral Daily Lexi Crespo APRN CNP   950 mg at 11/09/24 0817    ciprofloxacin (CIPRO) tablet 500 mg  500 mg Oral Q24H VINCENT Fracisco Wilkins DO   500 mg at 11/09/24 0822    darolutamide (NUBEQA) tablet 600 mg  600 mg Oral BID Lexi Crespo APRN CNP   600 mg at 11/09/24 0817    glucose gel 15-30 g  15-30 g Oral Q15 Min PRN Lexi Crespo APRN CNP        Or    dextrose 50 % injection 25-50 mL  25-50 mL Intravenous Q15 Min PRN Lexi Crespo APRN CNP        Or    glucagon injection 1 mg  1 mg Subcutaneous Q15 Min PRN Lexi Crespo APRN CNP        diclofenac (VOLTAREN) 1 % topical gel 4 g  4 g Topical TID PRN Lexi Crespo APRN CNP        famotidine (PEPCID) tablet 10 mg  10 mg Oral BID PRN Jordan Grimes DO   10 mg at 11/04/24 1612    heparin lock flush 10 unit/mL  injection 5-10 mL  5-10 mL Intracatheter Q1H PRN Lexi Crespo APRN CNP   5 mL at 11/04/24 0646    heparin lock flush 10 unit/mL injection 5-10 mL  5-10 mL Intracatheter Q24H Lexi Crespo APRN CNP   5 mL at 11/09/24 0420    heparin lock flush 100 unit/mL injection 5-10 mL  5-10 mL Intracatheter Q28 Days Lexi Crespo APRN CNP        insulin aspart (NovoLOG) injection (RAPID ACTING)  1-7 Units Subcutaneous TID AC Lexi Crespo APRN CNP   1 Units at 11/09/24 0903    insulin aspart (NovoLOG) injection (RAPID ACTING)  1-5 Units Subcutaneous At Bedtime Lexi Crespo APRN CNP   1 Units at 11/08/24 2203    lidocaine (LMX4) cream   Topical Q1H PRN Lexi Crespo APRN CNP        lidocaine 1 % 0.1-1 mL  0.1-1 mL Other Q1H PRN Lexi Crespo APRN CNP        medication instruction   Does not apply Continuous PRN Lexi Crespo APRN CNP        methocarbamol (ROBAXIN) tablet 500 mg  500 mg Oral Q6H PRN Lexi Crespo APRN CNP   500 mg at 11/02/24 1832    metoprolol succinate ER (TOPROL XL) 24 hr tablet 150 mg  150 mg Oral Daily Jagjit Arredondo MD   150 mg at 11/09/24 0818    naloxone (NARCAN) injection 0.2 mg  0.2 mg Intravenous Q2 Min PRN Tigist Yepez MD        Or    naloxone (NARCAN) injection 0.4 mg  0.4 mg Intravenous Q2 Min PRN Tigist Yepez MD        Or    naloxone (NARCAN) injection 0.2 mg  0.2 mg Intramuscular Q2 Min PRN Tigist Yepez MD        Or    naloxone (NARCAN) injection 0.4 mg  0.4 mg Intramuscular Q2 Min PRN Tigist Yepez MD        nitroGLYcerin (NITROSTAT) sublingual tablet 0.4 mg  0.4 mg Sublingual Q5 Min PRN Lexi Crespo APRN CNP        oxyCODONE (ROXICODONE) tablet 5-10 mg  5-10 mg Oral Q6H PRN Lexi Crespo APRN CNP   5 mg at 11/08/24 2203    pantoprazole (PROTONIX) EC tablet 40 mg  40 mg Oral QAM AC Tigist Yepez MD   40 mg at 11/09/24 0821    polyethylene glycol (MIRALAX) Packet 17 g  17 g Oral BID Jordan Grimes, DO    17 g at 11/06/24 0744    predniSONE (DELTASONE) tablet 5 mg  5 mg Oral Daily Lexi Crespo APRN CNP   5 mg at 11/09/24 0821    prochlorperazine (COMPAZINE) tablet 5 mg  5 mg Oral Q6H PRN Lexi Crespo APRN CNP        senna-docusate (SENOKOT-S/PERICOLACE) 8.6-50 MG per tablet 1 tablet  1 tablet Oral BID PRN Lexi Crespo APRN CNP        Or    senna-docusate (SENOKOT-S/PERICOLACE) 8.6-50 MG per tablet 2 tablet  2 tablet Oral BID PRN Leix Crespo APRN GUADALUPE        sodium chloride (PF) 0.9% PF flush 10-20 mL  10-20 mL Intracatheter q1 min prn Lexi Crespo APRN CNP   10 mL at 11/03/24 1714    sodium chloride (PF) 0.9% PF flush 10-20 mL  10-20 mL Intracatheter Q1H PRN Lexi Crespo APRN CNP   10 mL at 11/03/24 0428    sodium chloride (PF) 0.9% PF flush 10-20 mL  10-20 mL Intracatheter Q28 Days Lexi Crespo APRN CNP   20 mL at 10/29/24 1714    sodium chloride (PF) 0.9% PF flush 3 mL  3 mL Intracatheter Q8H Lexi Crespo APRN CNP   3 mL at 11/09/24 0823    sodium chloride (PF) 0.9% PF flush 3 mL  3 mL Intracatheter q1 min prn Lexi Crespo APRN CNP        [Held by provider] spironolactone (ALDACTONE) tablet 50 mg  50 mg Oral Daily Lexi Crespo APRN CNP   50 mg at 10/31/24 0913    [Held by provider] valsartan (DIOVAN) tablet 160 mg  160 mg Oral Daily Lexi Crespo APRN CNP   160 mg at 10/30/24 0816         Objective   Physical Exam:    Blood pressure 105/42, pulse 70, temperature 98  F (36.7  C), temperature source Oral, resp. rate 18, weight 109.4 kg (241 lb 1.6 oz), SpO2 98%.    Exam:  General: NAD  HEENT: no scleral icterus or injection  CARDIAC: RRR, holosystolic murmur that increases with first phase of valsava.  RESP:  CTAB  GI: +distended with fluid wave  : No salazar  EXTREMITIES: 1+ LE edema  SKIN: No acute lesions appreciated  NEURO: No focal deficits    Labs & Studies: I personally reviewed and interpreted the following  "studies:  CMP  Recent Labs   Lab 11/09/24  0846 11/09/24  0402 11/08/24  2152 11/08/24  1628 11/08/24  0844 11/08/24  0641 11/07/24  0738 11/07/24  0433 11/06/24  0749 11/06/24  0512   NA  --  139  --   --   --  139  --  138  --  141   POTASSIUM  --  3.5  --   --   --  4.0  --  3.5  --  3.5   CHLORIDE  --  103  --   --   --  104  --  103  --  105   CO2  --  25  --   --   --  25  --  27  --  28   ANIONGAP  --  11  --   --   --  10  --  8  --  8   * 132* 220* 154*   < > 135*   < > 112*   < > 106*   BUN  --  62.5*  --   --   --  58.0*  --  47.3*  --  51.6*   CR  --  1.57*  --   --   --  1.54*  --  1.51*  --  1.63*   GFRESTIMATED  --  46*  --   --   --  47*  --  48*  --  44*   SOLEDAD  --  7.9*  --   --   --  7.8*  --  7.6*  --  7.8*   MAG  --  2.1  --   --   --  2.2  --  2.2  --  2.2   PROTTOTAL  --  4.7*  --   --   --  4.6*  --  4.5*  --  4.4*   ALBUMIN  --  2.7*  --   --   --  2.7*  --  2.6*  --  2.6*   BILITOTAL  --  0.8  --   --   --  0.9  --  0.7  --  0.8   ALKPHOS  --  161*  --   --   --  153*  --  141  --  137   AST  --  41  --   --   --  39  --  35  --  29   ALT  --  22  --   --   --  18  --  16  --  13    < > = values in this interval not displayed.     CBC  Recent Labs   Lab 11/09/24 0402 11/08/24  0641 11/07/24  1747 11/07/24 0433 11/06/24  0512   WBC 7.6 7.0  --  6.9 7.9   RBC 2.26* 2.31*  --  2.41* 2.63*   HGB 7.3* 7.5* 8.6* 7.8* 8.6*   HCT 23.3* 24.3*  --  25.1* 27.0*   * 105*  --  104* 103*   MCH 32.3 32.5  --  32.4 32.7   MCHC 31.3* 30.9*  --  31.1* 31.9   RDW 21.1* 20.9*  --  20.9* 21.3*   PLT 49* 46*  --  45* 39*     BNPNo lab results found in last 7 days.  HsTropInvalid input(s): \"TROP\"      CXR:   Clear without focal opacities or congestion or effusion    ECHO:   Interpretation Summary  Global and regional left ventricular function is hyperkinetic with an EF of  65-70%.     Hypertrophic cardiomyoapthy with reverse septal curvature. The basal  anteroseptal segment is 2.5 cm. There is mid " ventricular obstruction with a  mid-cavity LVOT gradient of ~81 mmHg based a MR peak velocity of 6.5 m/s and   mmHg.     Global right ventricular function is normal.     The inferior vena cava was normal in size with preserved respiratory  variability.     Trivial pericardial effusion is present.

## 2024-11-09 NOTE — PROGRESS NOTES
[facility], [place]    Medicine Progress Note     Abbreviated version:     Gucci Logan is a 74 year old male with a past medical history of HCM w/ EDGAR, w/ valsalva LVOTO gradient, HFpEF, HTN, Metastatic Prostate Cancer, Decompensated Cirrhosis 2/2 HCV c/b ascites, SBP, CKD Stage III, T2DM, class II obesity. He was a direct admit 10/29/24 for escalation of his outpatient diuretic regimen ISO HCM, unable to make frequent outpatient labs and appts d/t mobility issues. Initially admitted to the Cardiology service, but hospital course c/b SBP after which he was transferred to the Medicine service on 10/31/24. Patient had initially been on a bumex infusion however after discussion with hepatology this was stopped due to concern for volume depletion. Repeat Para done on 11/2 with only slight decrease in cell count so abx switched from ceftriaxone to zosyn, cell count down on 11/6 re-tap, completed treatment on 11/7.       Dispo barriers:   [x] Final plan on SBP treatment - now on cipro ppx  [] Diuretic plan - bumex on hold  [] TCU acceptance  [] dyspnea - uptitrating metoprolol for HCM, paracentesis today    Skip to bottom of note for more detailed problem list    DVT Prophylaxis: thrombocytopenia; takes a daily asa  Rankin Catheter: Not present  Cardiac Monitoring: None  Code Status: Full Code      Diet: Regular Diet Adult  Snacks/Supplements Adult: Other; If pt would like a snack or suppelment, please send; With Meals  Snacks/Supplements Adult: Special K Bar; Between Meals        Fracisco Wilkins DO  Hospitalist  Pager: [phone]  Available on [employer]    ______________________________________________________________________    Subjective    Canelo is understandably frustrated with his persistent activity related dyspnea. He also complains this morning of fluid re-accumulation in his belly. Re-iterates his goals to get well enough to go to rehab and then move to somewhere without snow prior  to winter. He asks about needing recurrent paracenteses in the future and how that would be arranged.    Objective    Temp:  [97.8  F (36.6  C)-98.5  F (36.9  C)] 98.1  F (36.7  C)  Pulse:  [71-78] 73  Resp:  [16-20] 18  BP: (102-130)/(44-60) 126/60  SpO2:  [96 %-98 %] 98 %     Sitting up in bed in no distress  Awake, alert  Speech is clear and coherent  RR and WOB normal  HRRR w harsh early systolic murmur that improves with clenched fists  ++body wall edema in abdomen with pitting, abd distended     POCUS of abdomen reveals re-accumulation of ascites with safe pocket of fluid for paracentesis in the LLQ    Assessment & Plan   Shortness of Breath with Exertion  - high risk for DVT/PE given immobility, age, cancer diagnosis, but he's not tachycardic or hypoxemic and his [place] Criteria score is only 3 (8% risk); plt are low so DVT ppx has been on hold, though he is on aspirin  - leading diagnosis at this point is exacerbation of HCM due to over-diuresis; cardiology was reconsulted and is working on up-titrating his metoprolol (dose had been decreased a bit when he was getting actively diuresed on cards service)  - if dyspnea fails to improve over the weekend with uptitration of metoprolol can order VQ scan (only M through F business hours) or CTA (would prefer to hold off given ongoing renal recovery) to rule out PE    Spontaneous Bacterial Peritonitis  Cirrhosis 2/2 HCV, Newly Decompensated, c/b ascites, SBP  Completed treatment with zosyn, now on ciprofloxacin ppx, see ID note from 11/7    Suspected Abdominal Wall Cellulitis per ID; CRP 16  On my exam the area is diffusely erythematous without warmth, induration due to body wall edema (clearly pitting)   MRSA nares negative, suspect SSTI would have been treated by Zosyn though his anasarca complicates that a bit  Continue to hold off on further antibiotics at this time aside from SBP ppx     RENNY, suspect prerenal, improved but stalled out Cr around 1.5-1.6  CKD  Stage III  Baseline Cr ~0.8-1 recently  - diuretics on hold as we titrate metoprolol  - urine sodium low and urine concentrated 11/5, Cr improved with holding diuretic     BRBPR 11/7  - likely hemorrhoidal given presence of internal hemorrhoids on rectal exam for me 11/8; hgb checked soon after was stable and vitals have shown consistent stability    Anemia of chronic disease  - iron studies don't implicate deficiency; likely degree of splenic sequestration given plt count and liver disease  - continue to monitor for signs/symptoms consistent with GIB  - ok to transfuse for hgb < 7  - hgb trending down a bit and today's paracentesis more serosanguinous per CAPS provider concerning for possible small bleed into peritoneum -- added a cell count to fluid collected    Hypomagnesemia   ISO RENNY as above. RN protocol for replacement.     HCM with EDGAR with Valsalva LVOTO Gradient  Acute-on-Chronic HFpEF  HTN  Longstanding hx of HCM, recently established care w/ [facility] for HF. Has been struggling w/ worsening BLE edema, increased abdominal distension (weeping ascites), from poorly controlled hypervolemia. PTA diuretic regimen w/ Spironolactone was deemed inadequate, and given his difficulties w/ mobility at home and need for frequent appts/labs w/ titration of OP diuresis, decision was made to directly admit him instead for more aggressive titration of diuretic regimen w/ close cardiac monitoring. Started on Bumex drip here by Cards. Repeat ECHO 10/30/24 showing hyperkinetic w/ EF 65-70%, dynamic outflow tract obstruction, peak gradient 119mmHg at rest, grade II moderate diastolic dysfunction - on most recent echo 11/8 this gradient was lower (80) indicative of probable over-estimation on prior. Transferred to Medicine service 10/31 given SBP.              - Spironolactone and Bumex drip stopped 10/31; continue to hold diuretics              - Hold PTA Valsartan 160mg daily w/ low BP, worsening LVOT gradient   - reduced  PTA Metoprolol succinate 150mg daily to 125 mg daily, back up to 150 mg as of 11/8, 200 mg as of 11/9    - Avoid vasodilators               - Daily standing weights (per pt, a 'good' weight for him is 230 lbs)     Metastatic Prostate Cancer  Chronic Pancytopenia   Initially diagnosed earlier this year incidentally following a fall at home, fracturing his L femur. ORIF 05/24/24 and curretage samples c/w adenocarcinoma of prostate origin. Has since followed w/ Oncology as an OP, last saw 10/24/24. PTA on Daralutamide BID, Docetaxel (C4D1 was on 10/24/24) and Leuprolide [id number] (last 9/5/24). Also gets Neulasta, last 10/25. Has chronic pancytopenia, likely d/t multiple comorbidities (cirrhosis, CKD) and iatrogenic (chemotherapy). Baseline hgb ~8-9 recently, last few days in 7's  - Oncology consulted here 10/31 and recommended to continue Daralutamide, they signed off  - Continue PTA Prednisone   - Transfuse for Hgb <7      Type 2 Diabetes Mellitus, non-insulin-dependent, well-controlled  Last A1c 5.8% on 10/29/24. PTA Metformin. BG checks past 24 hours are all within goal (<180).   - Hold PTA Metformin given RENNY as above  - Continue medium sliding scale as started on admission  - BG checks TID AC + at bedtime + 0200  - Hypoglycemia protocol      Billing    MDM: high  See problem list above  Reviewed CBC, metabolic panel, cardiology consult  high risk due to need for continued inpatient management of dyspnea due to HCM, volume overload due to cirrhosis

## 2024-11-09 NOTE — PLAN OF CARE
Neuro: A&Ox4.   Cardiac: SR. VSS.   Respiratory: Sating 98 on RA. Pt c/o of SOB during activities, provider aware   GI/: Adequate urine output. BM X1  Diet/appetite: Tolerating regular diet. Eating well.  Activity:  Assist of 2, up to chair  Pain: pt c/o R leg pain, prn oxy given x 1   Skin: No new deficits noted.  LDA's:  SL port - SL   Plan: Continue with POC. Notify primary team with changes.

## 2024-11-09 NOTE — PLAN OF CARE
Shift: [id number]     Admission: admitted on 10/29/24 for heart failure exacerbation, worsening fluid retention/ edema, ascites and bilateral lower abdominal pain.      Neuro: alert and oriented x4  Cardiac: SR w heart murmur, denied chest pain  Respiratory: RA, SOB on exertion  GI/: continent b/b, uses urinal and bedside commode  Diet/appetite: Regular diet  Activity: Assist x2  Pain: reported 6/10 hip pain, refused pain medications  Skin: No significant findings  LDA's: HL Port     Plan: TCU when medically ready            Goal Outcome Evaluation:    Plan of Care Reviewed With: patient    Overall Patient Progress: no changeOverall Patient Progress: no change    Outcome Evaluation: SOB on exertion. 6/10 hip pain manage w oxycodone.

## 2024-11-09 NOTE — PLAN OF CARE
Care provide from [id number]    Temp: 97.4  F (36.3  C) Temp src: Oral BP: 105/42 Pulse: 71   Resp: 23 SpO2: 98 % O2 Device: None (Room air)       Neuro: A&Ox4. Speech is clear, spontaneous, and logical. Pupils equal, round, and reactive to light. Pt denies headache or changes in vision/hearing. Pt is hard of hearing at baseline and wears hearing aids. Pt endorses baseline bilateral lower extremity numbness.  Cardiac: Pt is on tele and has been in sinus rhythm. Pt denies chest pain or palpitations. Vitals signs stable. Afebrile. Heart murmur audible on auscultation.   Respiratory: Sating >94% on room air. Pt denies shortness of breath or difficulty breathing at rest. Pt does endorse dyspnea on exertion and dizziness with ambulation. Lung sounds clear/diminished on auscultation.   GI/: Pt denies difficulty voiding or having a bowel movement. Bowel sounds audible in all quadrants. Pt endorses passing flatus.Pt had bowel movement x2 this shift. Stool was dark brown/black in color and was soft. Pt denies nausea. Pt had left side abdomen paracentesis performed this afternoon in which 2 L of fluid was removed.   Diet/appetite: Tolerating regular diet with 1,500 mL fluid restriction. Pt ordered and ate majority of all meals this shift. Blood glucose checks ACHS.  Activity:  Assist of 1-2 with gait belt and walker. Pt was up to chair x2, to commode x2, and to bed x1.   Pain: Pt endorses generalized pain (primarily located in hips bilaterally) and rates pain 5/10. PRN medication offered multiple times throughout shift but pt declined.   Skin: Coccyx/sacrum wound (pea-sized and covered with Mepilex. Mepilex was changed this shift.), Left abdomen paracentesis site (covered with Band-Aid), bilateral lower extremity edema (lymph wraps in place. If able, wraps to remain in place until tomorrow, 11/10/2024 per rehab)  LDA's: Right upper chest power port (Heparin locked. Needle and dressing were changed this shift)    Plan:  Continue with POC. Notify [facility] team with changes.         Goal Outcome Evaluation:    Plan of Care Reviewed With: patient    Overall Patient Progress: no change    Outcome Evaluation: Pt endorsing dyspnea on exertion. Pt verbalizes that pain is still present in hips but managemeable. Vital signs stable. Afebrile.

## 2024-11-10 ENCOUNTER — APPOINTMENT (OUTPATIENT)
Dept: PHYSICAL THERAPY | Facility: CLINIC | Age: 74
End: 2024-11-10
Attending: INTERNAL MEDICINE
Payer: COMMERCIAL

## 2024-11-10 LAB
ABO/RH(D): NORMAL
ALBUMIN SERPL BCG-MCNC: 2.7 G/DL (ref 3.5–5.2)
ALP SERPL-CCNC: 171 U/L (ref 40–150)
ALT SERPL W P-5'-P-CCNC: 24 U/L (ref 0–70)
ANION GAP SERPL CALCULATED.3IONS-SCNC: 8 MMOL/L (ref 7–15)
ANTIBODY SCREEN: NEGATIVE
AST SERPL W P-5'-P-CCNC: 46 U/L (ref 0–45)
BILIRUB DIRECT SERPL-MCNC: 0.23 MG/DL (ref 0–0.3)
BILIRUB SERPL-MCNC: 0.8 MG/DL
BLD PROD TYP BPU: NORMAL
BLOOD COMPONENT TYPE: NORMAL
BUN SERPL-MCNC: 65.9 MG/DL (ref 8–23)
CALCIUM SERPL-MCNC: 7.9 MG/DL (ref 8.8–10.4)
CHLORIDE SERPL-SCNC: 106 MMOL/L (ref 98–107)
CODING SYSTEM: NORMAL
CREAT SERPL-MCNC: 1.54 MG/DL (ref 0.67–1.17)
CROSSMATCH: NORMAL
EGFRCR SERPLBLD CKD-EPI 2021: 47 ML/MIN/1.73M2
ERYTHROCYTE [DISTWIDTH] IN BLOOD BY AUTOMATED COUNT: 20.7 % (ref 10–15)
GLUCOSE BLDC GLUCOMTR-MCNC: 139 MG/DL (ref 70–99)
GLUCOSE BLDC GLUCOMTR-MCNC: 172 MG/DL (ref 70–99)
GLUCOSE BLDC GLUCOMTR-MCNC: 173 MG/DL (ref 70–99)
GLUCOSE BLDC GLUCOMTR-MCNC: 232 MG/DL (ref 70–99)
GLUCOSE SERPL-MCNC: 128 MG/DL (ref 70–99)
HCO3 SERPL-SCNC: 26 MMOL/L (ref 22–29)
HCT VFR BLD AUTO: 21.4 % (ref 40–53)
HGB BLD-MCNC: 6.8 G/DL (ref 13.3–17.7)
ISSUE DATE AND TIME: NORMAL
MAGNESIUM SERPL-MCNC: 2.2 MG/DL (ref 1.7–2.3)
MCH RBC QN AUTO: 33.2 PG (ref 26.5–33)
MCHC RBC AUTO-ENTMCNC: 31.8 G/DL (ref 31.5–36.5)
MCV RBC AUTO: 104 FL (ref 78–100)
PLATELET # BLD AUTO: 44 10E3/UL (ref 150–450)
POTASSIUM SERPL-SCNC: 3.8 MMOL/L (ref 3.4–5.3)
PROT SERPL-MCNC: 4.6 G/DL (ref 6.4–8.3)
RBC # BLD AUTO: 2.05 10E6/UL (ref 4.4–5.9)
SODIUM SERPL-SCNC: 140 MMOL/L (ref 135–145)
SPECIMEN EXPIRATION DATE: NORMAL
UNIT ABO/RH: NORMAL
UNIT NUMBER: NORMAL
UNIT STATUS: NORMAL
UNIT TYPE ISBT: 6200
WBC # BLD AUTO: 4.6 10E3/UL (ref 4–11)

## 2024-11-10 PROCEDURE — 120N000005 HC R&B MS OVERFLOW UMMC

## 2024-11-10 PROCEDURE — 99233 SBSQ HOSP IP/OBS HIGH 50: CPT | Performed by: PEDIATRICS

## 2024-11-10 PROCEDURE — 97110 THERAPEUTIC EXERCISES: CPT | Mod: GP

## 2024-11-10 PROCEDURE — 99207 PR NO CHARGE LOS: CPT | Performed by: STUDENT IN AN ORGANIZED HEALTH CARE EDUCATION/TRAINING PROGRAM

## 2024-11-10 PROCEDURE — 85027 COMPLETE CBC AUTOMATED: CPT | Performed by: NURSE PRACTITIONER

## 2024-11-10 PROCEDURE — P9016 RBC LEUKOCYTES REDUCED: HCPCS | Performed by: NURSE PRACTITIONER

## 2024-11-10 PROCEDURE — 250N000013 HC RX MED GY IP 250 OP 250 PS 637: Performed by: INTERNAL MEDICINE

## 2024-11-10 PROCEDURE — 250N000011 HC RX IP 250 OP 636: Performed by: NURSE PRACTITIONER

## 2024-11-10 PROCEDURE — 250N000013 HC RX MED GY IP 250 OP 250 PS 637: Performed by: PEDIATRICS

## 2024-11-10 PROCEDURE — 250N000012 HC RX MED GY IP 250 OP 636 PS 637: Performed by: NURSE PRACTITIONER

## 2024-11-10 PROCEDURE — 86923 COMPATIBILITY TEST ELECTRIC: CPT | Performed by: PEDIATRICS

## 2024-11-10 PROCEDURE — 86900 BLOOD TYPING SEROLOGIC ABO: CPT | Performed by: INTERNAL MEDICINE

## 2024-11-10 PROCEDURE — 82565 ASSAY OF CREATININE: CPT | Performed by: STUDENT IN AN ORGANIZED HEALTH CARE EDUCATION/TRAINING PROGRAM

## 2024-11-10 PROCEDURE — 250N000013 HC RX MED GY IP 250 OP 250 PS 637: Performed by: STUDENT IN AN ORGANIZED HEALTH CARE EDUCATION/TRAINING PROGRAM

## 2024-11-10 PROCEDURE — 250N000013 HC RX MED GY IP 250 OP 250 PS 637: Performed by: NURSE PRACTITIONER

## 2024-11-10 PROCEDURE — 83735 ASSAY OF MAGNESIUM: CPT | Performed by: PEDIATRICS

## 2024-11-10 PROCEDURE — 80048 BASIC METABOLIC PNL TOTAL CA: CPT | Performed by: STUDENT IN AN ORGANIZED HEALTH CARE EDUCATION/TRAINING PROGRAM

## 2024-11-10 PROCEDURE — 86923 COMPATIBILITY TEST ELECTRIC: CPT | Performed by: NURSE PRACTITIONER

## 2024-11-10 PROCEDURE — 82248 BILIRUBIN DIRECT: CPT | Performed by: NURSE PRACTITIONER

## 2024-11-10 PROCEDURE — 80053 COMPREHEN METABOLIC PANEL: CPT | Performed by: STUDENT IN AN ORGANIZED HEALTH CARE EDUCATION/TRAINING PROGRAM

## 2024-11-10 PROCEDURE — 84075 ASSAY ALKALINE PHOSPHATASE: CPT | Performed by: NURSE PRACTITIONER

## 2024-11-10 RX ORDER — POTASSIUM CHLORIDE 750 MG/1
20 TABLET, EXTENDED RELEASE ORAL ONCE
Status: COMPLETED | OUTPATIENT
Start: 2024-11-10 | End: 2024-11-10

## 2024-11-10 RX ADMIN — POTASSIUM CHLORIDE 20 MEQ: 750 TABLET, EXTENDED RELEASE ORAL at 13:31

## 2024-11-10 RX ADMIN — Medication 950 MG: at 08:25

## 2024-11-10 RX ADMIN — POLYETHYLENE GLYCOL 3350 17 G: 17 POWDER, FOR SOLUTION ORAL at 08:26

## 2024-11-10 RX ADMIN — METOPROLOL SUCCINATE 200 MG: 50 TABLET, EXTENDED RELEASE ORAL at 08:24

## 2024-11-10 RX ADMIN — OXYCODONE HYDROCHLORIDE 5 MG: 5 TABLET ORAL at 21:34

## 2024-11-10 RX ADMIN — ACETAMINOPHEN 650 MG: 325 TABLET, FILM COATED ORAL at 21:34

## 2024-11-10 RX ADMIN — CIPROFLOXACIN 500 MG: 500 TABLET ORAL at 08:28

## 2024-11-10 RX ADMIN — PREDNISONE 5 MG: 5 TABLET ORAL at 08:25

## 2024-11-10 RX ADMIN — INSULIN ASPART 1 UNITS: 100 INJECTION, SOLUTION INTRAVENOUS; SUBCUTANEOUS at 18:19

## 2024-11-10 RX ADMIN — ASPIRIN 81 MG CHEWABLE TABLET 81 MG: 81 TABLET CHEWABLE at 08:25

## 2024-11-10 RX ADMIN — INSULIN ASPART 1 UNITS: 100 INJECTION, SOLUTION INTRAVENOUS; SUBCUTANEOUS at 13:32

## 2024-11-10 RX ADMIN — HEPARIN, PORCINE (PF) 10 UNIT/ML INTRAVENOUS SYRINGE 5 ML: at 04:25

## 2024-11-10 RX ADMIN — PANTOPRAZOLE SODIUM 40 MG: 40 TABLET, DELAYED RELEASE ORAL at 08:25

## 2024-11-10 NOTE — PROGRESS NOTES
[facility], [place]    Medicine Progress Note     Abbreviated version:     Gucci Logan is a 74 year old male with a past medical history of HCM w/ EDGAR, w/ valsalva LVOTO gradient, HFpEF, HTN, Metastatic Prostate Cancer, Decompensated Cirrhosis 2/2 HCV c/b ascites, SBP, CKD Stage III, T2DM, class II obesity. He was a direct admit 10/29/24 for escalation of his outpatient diuretic regimen ISO HCM, unable to make frequent outpatient labs and appts d/t mobility issues. Initially admitted to the Cardiology service, but hospital course c/b SBP after which he was transferred to the Medicine service on 10/31/24. Patient had initially been on a bumex infusion however after discussion with hepatology this was stopped due to concern for volume depletion. Repeat Para done on 11/2 with only slight decrease in cell count so abx switched from ceftriaxone to zosyn, cell count down on 11/6 re-tap, completed treatment on 11/7. Re-tapped 11/9 with more bloody appearing fluid and hgb has slowly downtrended - possible small intra-peritoneal bleed vs blood loss from hemorrhoids.     Dispo barriers:   [x] Final plan on SBP treatment - now on cipro ppx  [] Diuretic plan - bumex on hold but may continue tomorrow  [] TCU acceptance  [x] dyspnea - uptitrated metoprolol for HCM    Skip to bottom of note for more detailed problem list    DVT Prophylaxis: holding due to plt < 50  Rankin Catheter: Not present  Cardiac Monitoring: None  Code Status: Full Code      Diet: Regular Diet Adult  Snacks/Supplements Adult: Other; If pt would like a snack or suppelment, please send; With Meals  Snacks/Supplements Adult: Special K Bar; Between Meals  Fluid restriction 1500 ML FLUID        Fracisco Wilkins DO  Hospitalist  Pager: [phone]  Available on Vocera    ______________________________________________________________________    Subjective    Canelo is doing well this morning - overnight nursing note reports  improvement in exertional dyspnea, but he says he hasn't been up much. Planning to do more moving around following the "[employer] game. No dyspnea at rest. No chest pain.     Objective    Temp:  [97.6  F (36.4  C)-98.4  F (36.9  C)] 97.7  F (36.5  C)  Pulse:  [63-85] 63  Resp:  [12-27] 12  BP: (106-117)/(49-56) 106/50  SpO2:  [94 %-98 %] 96 %     Lying in bed in no distress  Awake, alert  Speech is clear and coherent  RR and WOB normal    Assessment & Plan   Shortness of Breath with Exertion  - high risk for DVT/PE given immobility, age, cancer diagnosis, but he's not tachycardic or hypoxemic and his [place] Criteria score is only 3 (8% risk); plt are low so DVT ppx has been on hold, though he is on aspirin  - leading diagnosis at this point is exacerbation of HCM due to over-diuresis; cardiology was reconsulted and is working on up-titrating his metoprolol (dose had been decreased a bit when he was getting actively diuresed on cards service) --> cards to recheck echo tomorrow; will check in with patient regarding improvement in symptoms tomorrow    Spontaneous Bacterial Peritonitis  Cirrhosis 2/2 HCV, Newly Decompensated, c/b ascites, SBP  Completed treatment with zosyn, now on ciprofloxacin ppx, see ID note from 11/7     RENNY, suspect prerenal, improved but stalled out Cr around 1.5-1.6  CKD Stage III  Baseline Cr ~0.8-1 recently  - diuretics on hold as we titrate metoprolol  - urine sodium low and urine concentrated 11/5, Cr improved with holding diuretic     BRBPR 11/7  - likely hemorrhoidal given presence of internal hemorrhoids on rectal exam for me 11/8; hgb checked soon after was stable and vitals have shown consistent stability    Anemia of chronic disease, + possible small volume intraperitoneal bleed following paracentesis  - last tap was serosanguinous and hgb dropped about a gram overall; transfused this morning  - iron studies don't implicate deficiency; likely degree of splenic sequestration given plt  count and liver disease  - continue to monitor for signs/symptoms consistent with GIB  - ok to transfuse for hgb < 7    Hypomagnesemia   ISO RENNY as above. RN protocol for replacement.     HCM with EDGAR with Valsalva LVOTO Gradient  Acute-on-Chronic HFpEF  HTN  Longstanding hx of HCM, recently [facility] care w/ [facility] for [facility]. Has been struggling w/ worsening BLE edema, increased abdominal distension (weeping ascites), from poorly controlled hypervolemia. PTA diuretic regimen w/ Spironolactone was deemed inadequate, and given his difficulties w/ mobility at home and need for frequent appts/labs w/ titration of OP diuresis, decision was made to directly admit him instead for more aggressive titration of diuretic regimen w/ close cardiac monitoring. Started on Bumex drip here by Cards. Repeat ECHO 10/30/24 showing hyperkinetic w/ EF 65-70%, dynamic outflow tract obstruction, peak gradient 119mmHg at rest, grade II moderate diastolic dysfunction - on most recent echo 11/8 this gradient was lower (80) indicative of probable over-estimation on prior. Transferred to Medicine service 10/31 given SBP.              - Spironolactone and Bumex drip stopped 10/31; continue to hold diuretics              - Hold PTA Valsartan 160mg daily w/ low BP, worsening LVOT gradient   - reduced PTA Metoprolol succinate 150mg daily to 125 mg daily, back up to 150 mg as of 11/8, 200 mg as of 11/9    - Avoid vasodilators               - Daily standing weights (per pt, a 'good' weight for him is 230 lbs)     Metastatic Prostate Cancer  Chronic Pancytopenia   Initially diagnosed earlier this year incidentally following a fall at home, fracturing his L femur. ORIF 05/24/24 and curretage samples c/w adenocarcinoma of prostate origin. Has since followed w/ Oncology as an OP, last saw 10/24/24. PTA on Daralutamide BID, Docetaxel (C4D1 was on 10/24/24) and Leuprolide [id number] (last 9/5/24). Also gets Neulasta, last 10/25. Has chronic  pancytopenia, likely d/t multiple comorbidities (cirrhosis, CKD) and iatrogenic (chemotherapy). Baseline hgb ~8-9 recently, last few days in 7's  - Oncology consulted here 10/31 and recommended to continue Daralutamide, they signed off  - Continue PTA Prednisone   - Transfuse for Hgb <7      Type 2 Diabetes Mellitus, non-insulin-dependent, well-controlled  Last A1c 5.8% on 10/29/24. PTA Metformin. BG checks past 24 hours are all within goal (<180).   - Hold PTA Metformin given RENNY as above  - Continue medium sliding scale as started on admission  - BG checks TID AC + at bedtime + 0200  - Hypoglycemia protocol    Billing    MDM: high  See problem list above  Reviewed CBC, metabolic panel, overnight nursing note, brief cards note  high risk due to need for inpatient management of dyspnea believed to be due to HCM; medication titration with frequent lab and vital monitoring

## 2024-11-10 NOTE — PROGRESS NOTES
Cardiology Progress Note     Date of Service: 11/11/2024  Patient: Gucci Logan  MRN: [id number]  Admission Date: 10/29/2024  Hospital Day: 13    Reason for Consult: Cardiology was asked to assist in management of HOCM     Assessment and Plan:   Gucci Logan is a 74 year old male with a history of hypertrophic obstructive cardiomyopathy, metastatic prostate cancer on active chemotherapy, and HCV cirrhosis who is admitted on 10/29 for decompensated heart failure and whose hospital course has been prolonged in the setting of decompensated cirrhosis and SBP. Cardiology has been re-consulted due to persistent dyspnea and lightheadedness limiting his current disposition options.         Hypertrophic Obstructive Cardiomyopathy: Patient with known HOCM with reverse septal curvature. Prior to hospitalization his resting gradient was 19 mmHg and with valsalva this increased to 72 mmHg. He has been maintained on metoprolol succinate 150mmHg, valsartan 160mg daily, and bumex 2mg daily at home. He has had a complicated admission here with intermittent diuresis and several hospital acquired infections. His dyspnea has improved with uptitration of his metoprolol and while he still has a residual gradient on TTE, he is functionally feeling better and able to work with PT. As such we will recommend no further adjustments at this time. He is already being coordinated for myosin inhibitors as an outpatient and he will follow up with Dr. Gonsales on an outpatient basis. We will sign off at this time, please reach out if his clinical condition changes.      Brief Recommendations:  - continue metoprolol succinate 200mg daily  - continue to hold diuresis for today; ok to order prn bumex tomorrow for maintenance diuretic  - will plan for outpatient follow up with Dr. Gonsales for further treatment   - has general cardiology follow up on 12/6/24    Plan of care was discussed with attending physician Dr. Pat.   We  will sign off  Please don't hesitate to contact our team with any additional questions or concern    Subjective    Interval History:  NAOE. HR 70s overnight. BP ~120/50s. Reports feeling much improved today and was able to work with PT    Objective   Physical Exam:    Blood pressure 118/52, pulse 68, temperature 98  F (36.7  C), temperature source Oral, resp. rate 26, weight 106.5 kg (234 lb 12.8 oz), SpO2 99%.    Exam:  General: NAD  HEENT: no scleral icterus or injection  CARDIAC: RRR, + murmurs. No JVD  RESP:  diminished lung sounds  GI: +distended  : No salazar  EXTREMITIES: 2+ LE edema  SKIN: No acute lesions appreciated  NEURO: No focal deficits      Labs & Studies: I personally reviewed the following studies:  ROUTINE LABS (Last four results):  CMP  Recent Labs   Lab 11/11/24  0809 11/11/24  0439 11/10/24  2117 11/10/24  1801 11/10/24  0905 11/10/24  0425 11/09/24  0846 11/09/24  0402 11/08/24  0844 11/08/24  0641   NA  --  139  --   --   --  140  --  139  --  139   POTASSIUM  --  3.8  --   --   --  3.8  --  3.5  --  4.0   CHLORIDE  --  105  --   --   --  106  --  103  --  104   CO2  --  24  --   --   --  26  --  25  --  25   ANIONGAP  --  10  --   --   --  8  --  11  --  10   * 152* 232* 172*   < > 128*   < > 132*   < > 135*   BUN  --  60.7*  --   --   --  65.9*  --  62.5*  --  58.0*   CR  --  1.41*  --   --   --  1.54*  --  1.57*  --  1.54*   GFRESTIMATED  --  53*  --   --   --  47*  --  46*  --  47*   SOLEDAD  --  8.2*  --   --   --  7.9*  --  7.9*  --  7.8*   MAG  --  2.1  --   --   --  2.2  --  2.1  --  2.2   PROTTOTAL  --  4.6*  --   --   --  4.6*  --  4.7*  --  4.6*   ALBUMIN  --  2.7*  --   --   --  2.7*  --  2.7*  --  2.7*   BILITOTAL  --  0.9  --   --   --  0.8  --  0.8  --  0.9   ALKPHOS  --  180*  --   --   --  171*  --  161*  --  153*   AST  --  69*  --   --   --  46*  --  41  --  39   ALT  --  38  --   --   --  24  --  22  --  18    < > = values in this interval not displayed.     CBC  Recent  Labs   Lab 11/11/24  0439 11/10/24  0425 11/09/24  0402 11/08/24  0641   WBC 5.3 4.6 7.6 7.0   RBC 2.33* 2.05* 2.26* 2.31*   HGB 7.6* 6.8* 7.3* 7.5*   HCT 23.8* 21.4* 23.3* 24.3*   * 104* 103* 105*   MCH 32.6 33.2* 32.3 32.5   MCHC 31.9 31.8 31.3* 30.9*   RDW 21.4* 20.7* 21.1* 20.9*   PLT 51* 44* 49* 46*     INRNo lab results found in last 7 days.    Imaging:  POC US GUIDE FOR PARACENTESIS   Preliminary Result   US Indication: abdominal distension and abdominal pain      Limited abdominal ultrasound was performed and demonstrated an adequate fluid collection on the left side of the abdomen.       Doppler of the skin demonstrated an area at this site without significant vasculature.   A paracentesis at this site was subsequently performed.      Zahida Nickerson DO         Echo Complete   Final Result      XR Chest Port 1 View   Final Result   Impression:    Perihilar predominant interstitial opacities, favored to represent   atelectatic changes. No focal airspace opacity is identified.      I have personally reviewed the examination and initial interpretation   and I agree with the findings.      JOSTIN WALLACE MD            SYSTEM ID:  [id number]      POC US GUIDE FOR PARACENTESIS   Final Result   US Indication: abdominal distension      Limited abdominal ultrasound was performed and demonstrated an adequate fluid collection on the left side of the abdomen.       Doppler of the skin demonstrated an area at this site without significant vasculature.   A paracentesis at this site was subsequently performed.      Zahida Nickerson DO         POC US Guide for Paracentesis   Final Result   US Indication: decompensated liver disease      Limited abdominal ultrasound was performed and demonstrated an adequate fluid collection on the left side of the abdomen.       Doppler of the skin demonstrated an area at this site without significant vasculature.   A paracentesis at this site was subsequently performed.       Mata Koo MD         POC US GUIDE FOR PARACENTESIS   Preliminary Result   US Indication: abdominal distension      Limited abdominal ultrasound was performed and demonstrated an adequate fluid collection on the left side of the abdomen.       Doppler of the skin demonstrated an area at this site without significant vasculature.   A paracentesis at this site was subsequently performed.      Archana Fitzpatrick MD       Echo Complete   Final Result      POC US GUIDE FOR PARACENTESIS    (Results Pending)   POC US GUIDE FOR PARACENTESIS    (Results Pending)   POC US GUIDE FOR PARACENTESIS    (Results Pending)   Echo Limited    (Results Pending)   NM Lung Scan Ventilation and Perfusion    (Results Pending)

## 2024-11-10 NOTE — PLAN OF CARE
Shift: [id number]     Admission: decompensated heart failure      Neuro: Alert and oriented x4, [place], hearing aids in the room  Cardiac: SR, denied chest pain, murmur detected  Respiratory: pt SOB improved significantly during exertion, lungs clear and diminished   GI/: continent b/b, uses urinal and BSC  Diet/appetite: Regular diet, 1.5L FR  Activity: Assist x1  Pain: 6/10 pain, administered oxycodone and tylenol  Skin: No significant changes  LDA's: R Port             Goal Outcome Evaluation:    Plan of Care Reviewed With: patient    Overall Patient Progress: no changeOverall Patient Progress: no change    Outcome Evaluation: pt has dyspnea on exertion and complains of generalized pain. Pt expresses concerns about fluid retention in abdomen area.

## 2024-11-10 NOTE — PROGRESS NOTES
Brief Cardiology Progress Note    Patient thinks his breathing might have improved slightly but has not been very active this morning. Will continue metoprolol succinate 200 mg today. Will obtain echocardiogram tomorrow to reevaluate LVOT gradient (ordered for you).    His dyspnea may be multifactorial - anemia, deconditioning, PE?    We will continue to follow.    Ashley Long MD, PhD   of Medicine  Cardiovascular Division  [facility]

## 2024-11-11 ENCOUNTER — APPOINTMENT (OUTPATIENT)
Dept: GENERAL RADIOLOGY | Facility: CLINIC | Age: 74
DRG: 291 | End: 2024-11-11
Attending: PEDIATRICS
Payer: COMMERCIAL

## 2024-11-11 ENCOUNTER — APPOINTMENT (OUTPATIENT)
Dept: NUCLEAR MEDICINE | Facility: CLINIC | Age: 74
End: 2024-11-11
Attending: PEDIATRICS
Payer: COMMERCIAL

## 2024-11-11 ENCOUNTER — APPOINTMENT (OUTPATIENT)
Dept: OCCUPATIONAL THERAPY | Facility: CLINIC | Age: 74
End: 2024-11-11
Attending: INTERNAL MEDICINE
Payer: COMMERCIAL

## 2024-11-11 ENCOUNTER — APPOINTMENT (OUTPATIENT)
Dept: CARDIOLOGY | Facility: CLINIC | Age: 74
DRG: 291 | End: 2024-11-11
Attending: STUDENT IN AN ORGANIZED HEALTH CARE EDUCATION/TRAINING PROGRAM
Payer: COMMERCIAL

## 2024-11-11 LAB
ALBUMIN SERPL BCG-MCNC: 2.7 G/DL (ref 3.5–5.2)
ALP SERPL-CCNC: 180 U/L (ref 40–150)
ALT SERPL W P-5'-P-CCNC: 38 U/L (ref 0–70)
ANION GAP SERPL CALCULATED.3IONS-SCNC: 10 MMOL/L (ref 7–15)
AST SERPL W P-5'-P-CCNC: 69 U/L (ref 0–45)
BACTERIA FLD CULT: NO GROWTH
BILIRUB DIRECT SERPL-MCNC: 0.25 MG/DL (ref 0–0.3)
BILIRUB SERPL-MCNC: 0.9 MG/DL
BLD PROD TYP BPU: NORMAL
BLOOD COMPONENT TYPE: NORMAL
BUN SERPL-MCNC: 60.7 MG/DL (ref 8–23)
CALCIUM SERPL-MCNC: 8.2 MG/DL (ref 8.8–10.4)
CHLORIDE SERPL-SCNC: 105 MMOL/L (ref 98–107)
CODING SYSTEM: NORMAL
CREAT SERPL-MCNC: 1.41 MG/DL (ref 0.67–1.17)
CROSSMATCH: NORMAL
EGFRCR SERPLBLD CKD-EPI 2021: 53 ML/MIN/1.73M2
ERYTHROCYTE [DISTWIDTH] IN BLOOD BY AUTOMATED COUNT: 21.4 % (ref 10–15)
GLUCOSE BLDC GLUCOMTR-MCNC: 142 MG/DL (ref 70–99)
GLUCOSE BLDC GLUCOMTR-MCNC: 143 MG/DL (ref 70–99)
GLUCOSE BLDC GLUCOMTR-MCNC: 159 MG/DL (ref 70–99)
GLUCOSE BLDC GLUCOMTR-MCNC: 165 MG/DL (ref 70–99)
GLUCOSE SERPL-MCNC: 152 MG/DL (ref 70–99)
GRAM STAIN RESULT: NORMAL
GRAM STAIN RESULT: NORMAL
HCO3 SERPL-SCNC: 24 MMOL/L (ref 22–29)
HCT VFR BLD AUTO: 23.8 % (ref 40–53)
HGB BLD-MCNC: 7.6 G/DL (ref 13.3–17.7)
HGB BLD-MCNC: 8.3 G/DL (ref 13.3–17.7)
ISSUE DATE AND TIME: NORMAL
LVEF ECHO: NORMAL
MAGNESIUM SERPL-MCNC: 2.1 MG/DL (ref 1.7–2.3)
MCH RBC QN AUTO: 32.6 PG (ref 26.5–33)
MCHC RBC AUTO-ENTMCNC: 31.9 G/DL (ref 31.5–36.5)
MCV RBC AUTO: 102 FL (ref 78–100)
PLATELET # BLD AUTO: 51 10E3/UL (ref 150–450)
POTASSIUM SERPL-SCNC: 3.8 MMOL/L (ref 3.4–5.3)
PROT SERPL-MCNC: 4.6 G/DL (ref 6.4–8.3)
RBC # BLD AUTO: 2.33 10E6/UL (ref 4.4–5.9)
SODIUM SERPL-SCNC: 139 MMOL/L (ref 135–145)
UNIT ABO/RH: NORMAL
UNIT NUMBER: NORMAL
UNIT STATUS: NORMAL
UNIT TYPE ISBT: 6200
UPPER GI ENDOSCOPY: NORMAL
WBC # BLD AUTO: 5.3 10E3/UL (ref 4–11)

## 2024-11-11 PROCEDURE — 85018 HEMOGLOBIN: CPT | Performed by: STUDENT IN AN ORGANIZED HEALTH CARE EDUCATION/TRAINING PROGRAM

## 2024-11-11 PROCEDURE — 99233 SBSQ HOSP IP/OBS HIGH 50: CPT | Performed by: PEDIATRICS

## 2024-11-11 PROCEDURE — 82040 ASSAY OF SERUM ALBUMIN: CPT | Performed by: NURSE PRACTITIONER

## 2024-11-11 PROCEDURE — A9567 TECHNETIUM TC-99M AEROSOL: HCPCS | Performed by: PEDIATRICS

## 2024-11-11 PROCEDURE — 97140 MANUAL THERAPY 1/> REGIONS: CPT | Mod: GO

## 2024-11-11 PROCEDURE — 99233 SBSQ HOSP IP/OBS HIGH 50: CPT | Performed by: INTERNAL MEDICINE

## 2024-11-11 PROCEDURE — 93308 TTE F-UP OR LMTD: CPT | Mod: 26 | Performed by: INTERNAL MEDICINE

## 2024-11-11 PROCEDURE — 43235 EGD DIAGNOSTIC BRUSH WASH: CPT | Performed by: INTERNAL MEDICINE

## 2024-11-11 PROCEDURE — P9016 RBC LEUKOCYTES REDUCED: HCPCS | Performed by: PEDIATRICS

## 2024-11-11 PROCEDURE — G0500 MOD SEDAT ENDO SERVICE >5YRS: HCPCS | Performed by: INTERNAL MEDICINE

## 2024-11-11 PROCEDURE — 250N000013 HC RX MED GY IP 250 OP 250 PS 637: Performed by: PEDIATRICS

## 2024-11-11 PROCEDURE — 97535 SELF CARE MNGMENT TRAINING: CPT | Mod: GO

## 2024-11-11 PROCEDURE — 80053 COMPREHEN METABOLIC PANEL: CPT | Performed by: NURSE PRACTITIONER

## 2024-11-11 PROCEDURE — 99207 PR SERVICE NOT STAFFED W/SUPERV PROV: CPT | Performed by: INTERNAL MEDICINE

## 2024-11-11 PROCEDURE — 93321 DOPPLER ECHO F-UP/LMTD STD: CPT | Mod: 26 | Performed by: INTERNAL MEDICINE

## 2024-11-11 PROCEDURE — 250N000009 HC RX 250: Performed by: INTERNAL MEDICINE

## 2024-11-11 PROCEDURE — 82248 BILIRUBIN DIRECT: CPT | Performed by: NURSE PRACTITIONER

## 2024-11-11 PROCEDURE — 93325 DOPPLER ECHO COLOR FLOW MAPG: CPT

## 2024-11-11 PROCEDURE — 250N000013 HC RX MED GY IP 250 OP 250 PS 637: Performed by: NURSE PRACTITIONER

## 2024-11-11 PROCEDURE — 85027 COMPLETE CBC AUTOMATED: CPT | Performed by: NURSE PRACTITIONER

## 2024-11-11 PROCEDURE — 0DJ08ZZ INSPECTION OF UPPER INTESTINAL TRACT, VIA NATURAL OR ARTIFICIAL OPENING ENDOSCOPIC: ICD-10-PCS | Performed by: INTERNAL MEDICINE

## 2024-11-11 PROCEDURE — 82247 BILIRUBIN TOTAL: CPT | Performed by: NURSE PRACTITIONER

## 2024-11-11 PROCEDURE — 250N000011 HC RX IP 250 OP 636: Performed by: INTERNAL MEDICINE

## 2024-11-11 PROCEDURE — 999N000128 HC STATISTIC PERIPHERAL IV START W/O US GUIDANCE

## 2024-11-11 PROCEDURE — 93325 DOPPLER ECHO COLOR FLOW MAPG: CPT | Mod: 26 | Performed by: INTERNAL MEDICINE

## 2024-11-11 PROCEDURE — 250N000011 HC RX IP 250 OP 636: Performed by: NURSE PRACTITIONER

## 2024-11-11 PROCEDURE — 250N000013 HC RX MED GY IP 250 OP 250 PS 637: Performed by: INTERNAL MEDICINE

## 2024-11-11 PROCEDURE — 71046 X-RAY EXAM CHEST 2 VIEWS: CPT | Mod: 26 | Performed by: STUDENT IN AN ORGANIZED HEALTH CARE EDUCATION/TRAINING PROGRAM

## 2024-11-11 PROCEDURE — 272N000035 NM LUNG SCAN VENTILATION AND PERFUSION

## 2024-11-11 PROCEDURE — 120N000005 HC R&B MS OVERFLOW UMMC

## 2024-11-11 PROCEDURE — 343N000001 HC RX 343 MED OP 636: Performed by: PEDIATRICS

## 2024-11-11 PROCEDURE — A9540 TC99M MAA: HCPCS | Performed by: PEDIATRICS

## 2024-11-11 PROCEDURE — 78582 LUNG VENTILAT&PERFUS IMAGING: CPT

## 2024-11-11 PROCEDURE — 71046 X-RAY EXAM CHEST 2 VIEWS: CPT

## 2024-11-11 PROCEDURE — 83735 ASSAY OF MAGNESIUM: CPT | Performed by: PEDIATRICS

## 2024-11-11 PROCEDURE — 82947 ASSAY GLUCOSE BLOOD QUANT: CPT | Performed by: STUDENT IN AN ORGANIZED HEALTH CARE EDUCATION/TRAINING PROGRAM

## 2024-11-11 PROCEDURE — 78582 LUNG VENTILAT&PERFUS IMAGING: CPT | Mod: 26 | Performed by: RADIOLOGY

## 2024-11-11 PROCEDURE — 250N000012 HC RX MED GY IP 250 OP 636 PS 637: Performed by: NURSE PRACTITIONER

## 2024-11-11 RX ORDER — FENTANYL CITRATE 50 UG/ML
INJECTION, SOLUTION INTRAMUSCULAR; INTRAVENOUS PRN
Status: DISCONTINUED | OUTPATIENT
Start: 2024-11-11 | End: 2024-11-12

## 2024-11-11 RX ADMIN — ACETAMINOPHEN 650 MG: 325 TABLET, FILM COATED ORAL at 21:59

## 2024-11-11 RX ADMIN — OXYCODONE HYDROCHLORIDE 5 MG: 5 TABLET ORAL at 21:59

## 2024-11-11 RX ADMIN — PANTOPRAZOLE SODIUM 40 MG: 40 TABLET, DELAYED RELEASE ORAL at 08:41

## 2024-11-11 RX ADMIN — KIT FOR THE PREPARATION OF TECHNETIUM TC 99M ALBUMIN AGGREGATED 6.9 MILLICURIE: 2.5 INJECTION, POWDER, FOR SOLUTION INTRAVENOUS at 14:27

## 2024-11-11 RX ADMIN — METOPROLOL SUCCINATE 200 MG: 50 TABLET, EXTENDED RELEASE ORAL at 08:31

## 2024-11-11 RX ADMIN — HEPARIN, PORCINE (PF) 10 UNIT/ML INTRAVENOUS SYRINGE 5 ML: at 04:40

## 2024-11-11 RX ADMIN — ASPIRIN 81 MG CHEWABLE TABLET 81 MG: 81 TABLET CHEWABLE at 08:32

## 2024-11-11 RX ADMIN — Medication 950 MG: at 08:32

## 2024-11-11 RX ADMIN — INSULIN ASPART 1 UNITS: 100 INJECTION, SOLUTION INTRAVENOUS; SUBCUTANEOUS at 16:36

## 2024-11-11 RX ADMIN — KIT FOR THE PREPARATION OF TECHNETIUM TC 99M PENTETATE 2 MILLICURIE: 20 INJECTION, POWDER, LYOPHILIZED, FOR SOLUTION INTRAVENOUS; RESPIRATORY (INHALATION) at 14:27

## 2024-11-11 RX ADMIN — HEPARIN, PORCINE (PF) 10 UNIT/ML INTRAVENOUS SYRINGE 5 ML: at 20:02

## 2024-11-11 RX ADMIN — INSULIN ASPART 1 UNITS: 100 INJECTION, SOLUTION INTRAVENOUS; SUBCUTANEOUS at 08:31

## 2024-11-11 RX ADMIN — PREDNISONE 5 MG: 5 TABLET ORAL at 08:31

## 2024-11-11 RX ADMIN — CIPROFLOXACIN 500 MG: 500 TABLET ORAL at 08:32

## 2024-11-11 NOTE — PROGRESS NOTES
General Appearance: VSS A+O  Respiratory: Lungs CTA on room air.Decreased exchange in bases.  Cardiovascular: Sinus rhythm  GI: 2 black/brown BMs today.   Skin: No changes noted.   Other: Received 1 unit PRBCs this am 2/2 HGB 6.8. Transfused with no complications

## 2024-11-11 NOTE — PROGRESS NOTES
Care Management Follow Up    Length of Stay (days): 13    Expected Discharge Date: 11/13/2024     Concerns to be Addressed: discharge planning     Patient plan of care discussed at interdisciplinary rounds: Yes    Anticipated Discharge Disposition: TCU  Anticipated Discharge Services: TCU therapy services  Anticipated Discharge DME: n/a    Patient/family educated on Medicare website which has current facility and service quality ratings:  yes  Education Provided on the Discharge Plan:  yes  Patient/Family in Agreement with the Plan: yes    Referrals Placed by CM/SW:    DECLINED  - 11/4: [facility] (cost of cancer treatment)  - 11/5: [facility] (can't meet pt's needs)  - 11/6: [facility] (no beds)  - 11/7: [facility] (no beds)  - 11/7: [facility] (high cost of chemo meds)  - 11/11: [facility] (only take neuro patients)    [facility]  [facility]  [facility] [place] [id number]  4th floor of building  Phone: [phone]  Nurse to Nurse: [phone]   - 11/11: SW updated Carole in admissions on pt needing 1x/week OP paracentesis while at TCU. Per Carole, no need for SW to pre-arrange transport as [facility] transport would transport them (especially if pt goes to the [facility]).     Private pay costs discussed: Not applicable    Discussed  Partnership in Safe Discharge Planning  document with patient/family: No     Handoff Completed: No, handoff not indicated or clinically appropriate    Additional Information:  Per the Gold 7 attending (Claudette), pt would need weekly paracentesis while at TCU. Given location of [facility] (still a pending referral, they haven't given SW a final say on decision on referral), SW to schedule OP paracentesis at the [facility].     Per CHW, when she called central scheduling, they told her the following--- they will need orders for each appointment before scheduling, and they can't be standing orders either    SW will continue to follow and  assist as needed.     ___________________    IVANIA Lloyd, LGSW  6C , covering beds 6401 to 6519  [facility]   Phone: [phone]  6C Cards MITUL [place]

## 2024-11-11 NOTE — PROGRESS NOTES
GASTROENTEROLOGY PROGRESS NOTE    Date: 11/11/2024     ASSESSMENT:  Gucci Logan is a 74 year old male with a history significant for MASLD/HepC s/p IFN/Ribavarin/Boceprevir related compensated cirrhosis, Metastatic Prostate Ca s/p Rad now combination ADT/Docetaxel, HCM c/b HFpEF who was admitted for acute on chronic systolic heart failure and new onset ascites complicated by spontaneous bacterial peritonitis.        #BRBPR  #Reports of melena    Patient reports black, tarry stool, black stools noted in nursing notes; saw a picture- stool looks brown/green w/ small amount of dark red blood on top; HD stable, needed a blood transfusion overnight; last EGD in May 2023 showed PHG w/ no varices; very low suspicion for variceal bleeding but could have PHG, PUD, gastritis/esophagitis, etc     # New Ascites complicated by SBP   # RENNY (baseline Cr 1)  Patient with new onset ascites; diagnostic fluid studies consistent with portal hypertensive ascites 2/2 cirrhosis and cell ct c.w. SBP. Ceftriaxone 2 g daily started 10/30/24, repeat para also positive 11/2, switched abx to Zosyn; repeat para 11/6 was improved (580->558->452); Cr on admit 1.69, now 1.4     #. Decompensated Cirrhosis   Primary Hepatologist: Dr. Renteria   Etiology: HCV/MASLD   Ascites: + clinically.   - hold 50mg spironolactone, 2mg Bumex   - SBP history: + hx (dx 10/30/24) , repeat para also positive 11/2, switched abx to Zosyn; repeat para 11/6 was improved (580->558->452); completed 5 days of Zosyn per ID; will need ppx   Hepatic encephalopathy: PTA lactulose and rifaximin  EV: EGD 5/2023 without varices   TIPS: n/a   PV: Patent on US 8/19/24   HCC: CT A/P 10/18/24 without concerning lesions      MELD 3.0: 11 at 8/21/2024  6:34 AM  MELD-Na: 10 at 8/21/2024  6:34 AM  Calculated from:  Serum Creatinine: 1.04 mg/dL at 8/21/2024  6:34 AM  Serum Sodium: 140 mmol/L (Using max of 137 mmol/L) at 8/21/2024  6:34 AM  Total Bilirubin: 1.1 mg/dL at 8/20/2024   "6:06 AM  Serum Albumin: 2.7 g/dL at 8/20/2024  6:06 AM  INR(ratio): 1.32 at 8/19/2024  5:14 PM  Age at listing (hypothetical): 74 years  Sex: Male at 8/21/2024  6:34 AM      RECOMMENDATIONS:  - NPO  - EGD today   - Cont cipro ppx    Thank you for involving us in this patient's care. Please do not hesitate to contact the GI service with any questions or concerns.      Pt care plan discussed with Dr. Keating, GI staff physician.    Amie Severino   Gastroenterology Fellow  _______________________________________________________________    Subjective: patient reports \"black, tarry\" stools and nursing notes state stools are black; no BRBPR; SOB w/ exertion is much improved today     Objective:  Blood pressure 110/50, pulse 67, temperature (!) 96.5  F (35.8  C), temperature source Axillary, resp. rate 15, weight 106.5 kg (234 lb 12.8 oz), SpO2 100%.    General: NAD    Lungs: on RA, comfortable  Abdomen: soft, distended, NT  Extremities: no edema, erythematous rash bl anterior legs  Musculoskeletal: No gross deformity.  Skin: No jaundice   Mental status/Psych: A&O. Asks/answers questions appropriately. Pleasant, interactive     LABS:  [facility]  Recent Labs   Lab 11/11/24  1329 11/11/24  0809 11/11/24  0439 11/10/24  2117 11/10/24  0905 11/10/24  0425 11/09/24  0846 11/09/24  0402 11/08/24  0844 11/08/24  0641   NA  --   --  139  --   --  140  --  139  --  139   POTASSIUM  --   --  3.8  --   --  3.8  --  3.5  --  4.0   CHLORIDE  --   --  105  --   --  106  --  103  --  104   SOLEDAD  --   --  8.2*  --   --  7.9*  --  7.9*  --  7.8*   CO2  --   --  24  --   --  26  --  25  --  25   BUN  --   --  60.7*  --   --  65.9*  --  62.5*  --  58.0*   CR  --   --  1.41*  --   --  1.54*  --  1.57*  --  1.54*   * 143* 152* 232*   < > 128*   < > 132*   < > 135*    < > = values in this interval not displayed.     CBC  Recent Labs   Lab 11/11/24  0439 11/10/24  0425 11/09/24  0402 11/08/24  0641   WBC 5.3 4.6 7.6 7.0   RBC 2.33* 2.05* 2.26* 2.31* "   HGB 7.6* 6.8* 7.3* 7.5*   HCT 23.8* 21.4* 23.3* 24.3*   * 104* 103* 105*   MCH 32.6 33.2* 32.3 32.5   MCHC 31.9 31.8 31.3* 30.9*   RDW 21.4* 20.7* 21.1* 20.9*   PLT 51* 44* 49* 46*     INRNo lab results found in last 7 days.  LFTs  Recent Labs   Lab 11/11/24  0439 11/10/24  0425 11/09/24  0402 11/08/24  0641   ALKPHOS 180* 171* 161* 153*   AST 69* 46* 41 39   ALT 38 24 22 18   BILITOTAL 0.9 0.8 0.8 0.9   PROTTOTAL 4.6* 4.6* 4.7* 4.6*   ALBUMIN 2.7* 2.7* 2.7* 2.7*      PANCNo lab results found in last 7 days.

## 2024-11-11 NOTE — PROGRESS NOTES
[facility] [place]    Medicine Progress Note     Abbreviated version:   Gucci Logan is a 74 year old male with a past medical history of HCM w/ EDGAR, w/ valsalva LVOTO gradient, HFpEF, HTN, Metastatic Prostate Cancer, Decompensated Cirrhosis 2/2 HCV c/b ascites, SBP, CKD Stage III, T2DM, class II obesity. He was a direct admit 10/29/24 for escalation of his outpatient diuretic regimen ISO HCM, unable to make frequent outpatient labs and appts d/t mobility issues. Initially admitted to the Cardiology service, but hospital course c/b SBP after which he was transferred to the Medicine service on 10/31/24. Patient had initially been on a bumex infusion however after discussion with hepatology this was stopped due to concern for volume depletion. Repeat Para done on 11/2 with only slight decrease in cell count so abx switched from ceftriaxone to zosyn, cell count down on 11/6 re-tap, completed treatment on 11/7. Re-tapped 11/9 with more bloody appearing fluid and hgb has slowly downtrended - possible small intra-peritoneal bleed vs blood loss from hemorrhoids.     Dispo barriers:   [x] Final plan on SBP treatment - now on cipro ppx  [] Diuretic plan - bumex on hold but may continue tomorrow  [] TCU acceptance - [facility]  [] VQ scan to r/o PE today  [] Outpatient paracentesis plan - probably weekly for at least a month, SW/CM planning with [facility]  [x] dyspnea - uptitrated metoprolol for HCM; waiting on VQ     Skip to bottom of note for more detailed problem list    DVT Prophylaxis: asa; plt are low (~50) making dvt ppx high risk  Rankin Catheter: Not present  Cardiac Monitoring: None  Code Status: Full Code      Diet: Regular Diet Adult  Snacks/Supplements Adult: Other; If pt would like a snack or suppelment, please send; With Meals  Snacks/Supplements Adult: [employer]; Between Meals  Fluid restriction 1500 ML FLUID      Fracisco Wilkins DO  Hospitalist  Pager: [phone]  Available  on Vocera    ______________________________________________________________________    Subjective    Canelo reports that his shortness of breath is improved today. He was able to get up and work with PT, walk to the bathroom, and even demonstrated getting up out of bed and using the walker to get over to his chair without developing significant shortness of breath. He reports some vertigo/light headedness with position change which has persisted. Otherwise no new complaints. Tolerating the increased dose of metoprolol and had echo earlier with read pending.     Objective    Temp:  [97.7  F (36.5  C)-98.4  F (36.9  C)] 98  F (36.7  C)  Pulse:  [68-78] 68  Resp:  [17-26] 26  BP: ()/(46-94) 118/52  SpO2:  [96 %-99 %] 99 %     Lying down in no distress  Awake, alert  Speech is clear and coherent  RR and WOB normal  Able to stand unassisted and use walker to ambulate to chair - no significant dyspnea with this  Endorsed vertigo during this but had no nystagmus    Assessment & Plan   Shortness of Breath with Exertion  - high risk for DVT/PE given immobility, age, cancer diagnosis, but he's not tachycardic or hypoxemic and his Clare Criteria score is only 3 (8% risk); plt are low so DVT ppx has been on hold, though he is on aspirin  - leading diagnosis at this point is exacerbation of HCM due to over-diuresis; cardiology was reconsulted and is working on up-titrating his metoprolol (dose had been decreased a bit when he was getting actively diuresed on cards service) --> cards to recheck echo today  - ordered VQ today to avoid contrast and aid in ruling out PE as d-dimer unlikely to be helpful given comorbids  - this has improved significantly and seems to have all but resolved with a higher dose of metoprolol + transfusion    Spontaneous Bacterial Peritonitis  Cirrhosis 2/2 HCV, Newly Decompensated, c/b ascites, SBP  Completed treatment with zosyn, now on ciprofloxacin ppx, see ID note from 11/7     RENNY, suspect  prerenal, improved but stalled out Cr around 1.5-1.6  CKD Stage III  Baseline Cr ~0.8-1 recently  - diuretics on hold as we titrate metoprolol  - urine sodium low and urine concentrated 11/5, Cr improved with holding diuretic     BRBPR 11/7  - likely hemorrhoidal given presence of internal hemorrhoids on rectal exam for me 11/8; hgb checked soon after was stable and vitals have shown consistent stability    Anemia of chronic disease, + possible small volume intraperitoneal bleed following paracentesis  - last tap was serosanguinous and hgb dropped about a gram overall; transfused 1 unit 11/10  - iron studies don't implicate deficiency; likely degree of splenic sequestration given plt count and liver disease  - continue to monitor for signs/symptoms consistent with GIB  - ok to transfuse for hgb < 7; cardiology recommending higher threshold of 8 g - discussed with patient and will go ahead and give another unit given persistent light headedness with position change  - GI scoped today - small varices but no source of GIB; sticking with hypothesis that he had a small intraperitoneal bleed which has resolved    Hypomagnesemia   ISO RENNY as above. RN protocol for replacement.     HCM with EDGAR with Valsalva LVOTO Gradient  Acute-on-Chronic HFpEF  HTN  Longstanding hx of HCM, recently established care w/ [facility] for HF. Has been struggling w/ worsening BLE edema, increased abdominal distension (weeping ascites), from poorly controlled hypervolemia. PTA diuretic regimen w/ Spironolactone was deemed inadequate, and given his difficulties w/ mobility at home and need for frequent appts/labs w/ titration of OP diuresis, decision was made to directly admit him instead for more aggressive titration of diuretic regimen w/ close cardiac monitoring. Started on Bumex drip here by Cards. Repeat ECHO 10/30/24 showing hyperkinetic w/ EF 65-70%, dynamic outflow tract obstruction, peak gradient 119mmHg at rest, grade II moderate  diastolic dysfunction - on most recent echo 11/8 this gradient was lower (80) indicative of probable over-estimation on prior. Transferred to Medicine service 10/31 given SBP.              - Spironolactone and Bumex drip stopped 10/31; restart a daily bumex likely tomorrow pending cards recs              - Hold PTA Valsartan 160mg daily w/ low BP, worsening LVOT gradient   - reduced PTA Metoprolol succinate 150mg daily to 125 mg daily, back up to 150 mg as of 11/8, 200 mg as of 11/9    - Avoid vasodilators               - Daily standing weights (per pt, a 'good' weight for him is 230 lbs)     Metastatic Prostate Cancer  Chronic Pancytopenia   Initially diagnosed earlier this year incidentally following a fall at home, fracturing his L femur. ORIF 05/24/24 and curretage samples c/w adenocarcinoma of prostate origin. Has since followed w/ Oncology as an OP, last saw 10/24/24. PTA on Daralutamide BID, Docetaxel (C4D1 was on 10/24/24) and Leuprolide [id number] (last 9/5/24). Also gets Neulasta, last 10/25. Has chronic pancytopenia, likely d/t multiple comorbidities (cirrhosis, CKD) and iatrogenic (chemotherapy). Baseline hgb ~8-9 recently, last few days in 7's  - Oncology consulted here 10/31 and recommended to continue Daralutamide, they signed off  - patient tells me his next infusion is scheduled for Thursday  - Continue PTA Prednisone   - Transfuse for Hgb <7      Type 2 Diabetes Mellitus, non-insulin-dependent, well-controlled  Last A1c 5.8% on 10/29/24. PTA Metformin. BG checks past 24 hours are all within goal (<180).   - Hold PTA Metformin given RENNY as above  - Continue medium sliding scale as started on admission  - BG checks TID AC + at bedtime + 0200  - Hypoglycemia protocol    Billing    MDM: high  See problem list above  Reviewed CBC, CMP, cards note, discussed case with nuc med, ordered 1 unit(s) prbc and VQ scan  high risk due to need for continued inpatient management of dyspnea related to HCM

## 2024-11-11 NOTE — PLAN OF CARE
Shift: [id number]     Admission: admitted on 10/29/24 for heart failure exacerbation, worsening fluid retention/ edema, ascites and bilateral lower abdominal pain.      Neuro: alert and oriented x4  Cardiac: SR w heart murmur, denied chest pain  Respiratory: RA, SOB on exertion  GI/: continent b/b, uses urinal and bedside commode  Diet/appetite: Regular diet  Activity: Assist x2  Pain: reported 6/10 hip pain, refused pain medications  Skin: No significant findings  LDA's: HL Port     Plan: TCU when medically ready           Goal Outcome Evaluation:    Plan of Care Reviewed With: patient    Overall Patient Progress: no changeOverall Patient Progress: no change    Outcome Evaluation: pt has dyspnea on exertion and complains of generalized pain. Pt expresses fullness in abnominal area.

## 2024-11-11 NOTE — BRIEF OP NOTE
[facility]    Brief Operative Note    Pre-operative diagnosis: Melena [K92.1]  Post-operative diagnosis Same as pre-operative diagnosis    Procedure: Esophagoscopy, gastroscopy, duodenoscopy (EGD), combined, N/A - Esophagus    Surgeon: Surgeons and Role:     * Home Morataya MD - Primary  Anesthesia: Moderate Sedation   Estimated Blood Loss: None    Drains: None  Specimens: * No specimens in log *  Findings:   - Small varices, mild PHG. No etiology for bleeding found on this exam .  Complications: None.  Implants: * No implants in log *    - ADAT  - Continue to monitor for signs of GIB

## 2024-11-11 NOTE — OR NURSING
Pt underwent EGD under conscious sedation. Specimens: none. Pt transported back to floor and report called to bedside RN.   (Blood completed during procedure- unable to scan/properly document in flowsheets/MAR due to technical issue. Message passed along to bedside RN during phone report.)       Concepcion Dumas RN

## 2024-11-11 NOTE — PROGRESS NOTES
[facility] - GENERAL INFECTIOUS DISEASES PROGRESS NOTE     Patient:  Gucci Logan   Date of birth 1950, Medical record number [id number]  Date of Visit:  11/11/2024  Date of Admission: 10/29/2024  Consult Requested by:Fracisco Wilkins DO  Reason for Consult: SBP treatment recs, failed to respond to rocephin, slow to respond to zosyn, no bug          Assessment and Recommendations:     ASSESSMENT:  Anasarca  Culture negative neutrocytic ascites (infection vs malignancy), completed 5-day course pip-tazo for presumed SBP  Abdominal pain - lateral lower abdominal wall edema with mild cellulitis ( left lower abdomen)  HCM   Metastatic prostate cancer   liver cirrhosis   History of Hepatitis C s/p treatment , undetectable viral load in April 2024  Anemia  Thrombocytopenia  Diabetes mellitus ( HbA1c 5.8 on 10/29/24)  history of constipation   Leukocytosis - resolved  skin lesions ( sacral area/ legs)   Diarhhea - black stools    RECOMMENDATIONS:  1. Continue ciprofloxacin, SBP prophylaxis  2. ID will sign off      Discussion: Gucci Logan is a 74 year old male with a history of HCM, HTN, metastatic prostate cancer, cirrhosis due to  chronic HCV infection s/p treatment, portal hypertension, ascites, CKD 3 , Diabetes mellitus type 2 ( HbA1c 5.8 on 10/29/24), chronic thrombocytopenia,  chronic anemia, high subtrochanteric fracture L proximal femur s/p open reduction, cerclage fixation fracture along with cephalomedullary Internal fixation with lag screw and interlocking screw on 5/20/24,  admitted on 10/29/24  for heart failure exacerbation, worsening fluid retention/ edema, ascites and bilateral lower abdominal pain. Paracentesis on 10/30 suggestive of SBP, with PMNs 580 and he was started on pip-tazo. He is now s/p 4 paracentesis, with decreasing PMNs on antibiotics. He has now completed a 5 day course and has transitioned to ciprofloxacin for SBP prophylaxis. There was concern for some abdominal  wall cellulitis, but this seems improved to me today. I suspect this was from skin trauma with distended abdoman rather than cellulitis, and it seems to have improved with improvement in distention. He remains afebrile and breathing is slowly improving.       Recommendations were discussed with the primary team.      ID will sign off given a finalized plan and no obvious need for additional antibiotics (outside ppx). Call anytime with questions, concerns, or if changes in status.  Always happy to revisit the case.     Total time spent during this encounter (chart review, documentation, MDM, coordination of care): 50 minutes     Dudley Mckeon MD  Contact via [employer] or [employer] Paging/Directory           Interval History:     Remains afebrile. No leukocytosis.    Microbiology:  11/9 Ascites   No cultures obtained, PMNs 104  11/8 Nares screen  Neg SA   11/6 Ascites   NG, PMNs 452  11/2 Ascites   NG, PMNs 558  10/30 Ascites   NG, PMNs 580   Blood   NG    Current antimicrobials:   None    Prior antimicrobials:  Pip/Tazobactam (11/3-11/8)  Ceftriaxone (10/30-11/2)    SBP ppx: ciprofloxacin         History of Present Illness:     Gucci Logan is a 74 year old male with a history of HCM, HTN, metastatic prostate cancer, cirrhosis due to  chronic HCV infection s/p treatment, portal hypertension, ascites, CKD 3 , Diabetes mellitus type 2 ( HbA1c 5.8 on 10/29/24), chroic thrombocytopenia,  chronic anemia, high subtrochanteric fracture L proximal femur s/p open reduction, cerclage fixation fracture along with cephalomedullary Internal fixation with lag screw and interlocking screw on 5/20/24,  admitted on 10/29/24  for heart failure exacerbation, worsening fluid retention/ edema, ascites and bilateral lower abdominal pain. no fever, chills.      He had paracentesis on 10/30/24 with 579 absolute neutrophils (79% Neutrophils) Culture - no growth . ceftriaxone was started repeat paracentesis on 11/2/24 showed absolute  nettrophils 558 (80% neutrophils) with negative culture. ceftriaxone was discontinued and Pip/Tazobactam was started paracentesis on 11/6/24 with 452 absolute neutrophils (63% neutrophils) - culture is negative to date     Peripheral WBC has normalized. He has been afebrile. tolerates oral intake, has nausea but no vomiting         Review of Systems:   CONSTITUTIONAL:  No fevers or chills  EYES: negative for icterus  ENT:  negative for hearing loss, tinnitus and sore throat  RESPIRATORY:  negative for cough with sputum and dyspnea  CARDIOVASCULAR: see HPI  GASTROINTESTINAL:  see HPI   GENITOURINARY:  negative for dysuria  HEME:  No easy bruising  INTEGUMENT:  see HPI  NEURO:  Negative for headache         Past Medical History:     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    Diverticular disease of colon 07/14/2015    Esophageal reflux 03/01/2014    Gastroesophageal reflux disease, esophagitis presence not specified 10/06/2016    IMO Regulatory Load OCT 2020      Glaucoma suspect of both eyes 04/30/2024    Hearing problem     Hiatal hernia     Hyperlipidemia LDL goal <100 04/19/2017    Hypertension     Hypertension goal BP (blood pressure) < 140/90 02/08/2013    Intertrochanteric fracture of left femur, closed, initial encounter (H) 05/19/2024    Jaundice 05/31/2008    Liver disease     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 Not keen to use it.     Stage 3 chronic kidney disease, unspecified whether stage 3a or 3b CKD (H) 01/23/2024    Syncope, unspecified syncope type 02/20/2023    Thrombocytopenia (H) 08/28/2008    Overview:   8/28/2008, attributed to liver disease with portal hypertension and functional splenomegaly            Past Surgical History:     Past Surgical History:   Procedure Laterality Date    ARTHROSCOPY KNEE RT/LT      (L) with partial medial meniscectomy    CATARACT IOL, RT/LT  Nov and Dec 2017    COLONOSCOPY N/A 4/24/2019    Procedure: COLONOSCOPY, WITH POLYPECTOMY AND BIOPSY;  Surgeon: Leventhal, Thomas Michael, MD;  Location: [facility]    COLONOSCOPY N/A 9/14/2022    Procedure: COLONOSCOPY;  Surgeon: Rafa Renee MD;  Location:  GI    DACRYOCYSTORHINOSTOMY Left 10/16/2018    Procedure: DACRYOCYSTORHINOSTOMY;  Surgeon: Madhu Krause MD;  Location: [facility]    ENDOSCOPIC ENDONASAL SURGERY  1994    ENDOSCOPY  2-19-15    ESOPHAGOSCOPY, GASTROSCOPY, DUODENOSCOPY (EGD), COMBINED N/A 4/24/2019    Procedure: COMBINED ESOPHAGOSCOPY, GASTROSCOPY, DUODENOSCOPY (EGD) - hold aspirin ibuprofen or naproxen for one week prior (per physician order);  Surgeon: Leventhal, Thomas Michael, MD;  Location: [facility] OR    ESOPHAGOSCOPY, GASTROSCOPY, DUODENOSCOPY (EGD), COMBINED N/A 5/23/2023    Procedure: Esophagoscopy, gastroscopy, duodenoscopy, combined;  Surgeon: Rafa Renee MD;  Location:  GI    EYE SURGERY      Hernia surgery Left 1994    NASAL/SINUS POLYPECTOMY      OPEN REDUCTION INTERNAL FIXATION HIP NAILING Left 5/20/2024    Procedure: open reduction  internal fixation hip nailing left;  Surgeon: Rafa Wagner MD;  Location: [facility] OR    REPAIR PTOSIS Left 10/16/2018    Procedure: LEFT UPPER LID PTOSIS AND BILATERAL  BROW PTOSIS REPAIR WITH LEFT DACRYOCYSTORHINOSTOMY ;  Surgeon: Madhu Krause MD;  Location: [facility]    REPAIR PTOSIS BROW Bilateral 10/16/2018    Procedure: REPAIR PTOSIS BROW;  Surgeon: Madhu Krause MD;  Location:  [facility]    ROTATOR CUFF REPAIR RT/LT Right     ZZC OPEN RX ANKLE DISLOCATN+FIXATN      (R)    ZZC SPINAL FUSION,ANT,EA ADNL LEVEL      T12 - L1          Family History:     Family History   Problem Relation Age of Onset    Alzheimer Disease Mother 85    Dementia Mother     Cerebrovascular Disease Father 50    Cancer Father     Hypertension Father     Neurologic Disorder No family hx of     Diabetes No family hx of           Social History:     Social History     Tobacco Use    Smoking status: Former     Current packs/day: 0.00     Types: Cigarettes     Start date: 1990     Quit date: 1999     Years since quittin.8     Passive exposure: Never    Smokeless tobacco: Never   Substance Use Topics    Alcohol use: Yes     Comment: rare     History   Sexual Activity    Sexual activity: Not Currently    Partners: Female     lives alone, no children          Current Medications (antimicrobials listed in bold):     Current Facility-Administered Medications   Medication Dose Route Frequency Provider Last Rate Last Admin    aspirin EC tablet 81 mg  81 mg Oral Daily Lexi Crespo APRN CNP   81 mg at 24 0832    [Held by provider] bumetanide (BUMEX) tablet 2 mg  2 mg Oral Daily Anabellastaff, Fracisco, DO        calcium citrate (CITRACAL) tablet 950 mg  950 mg Oral Daily Lexi Crespo APRN CNP   950 mg at 24 0832    ciprofloxacin (CIPRO) tablet 500 mg  500 mg Oral Q24H VINCENT Biggerstaff, Fracisco, DO   500 mg at 24 0832    darolutamide (NUBEQA) tablet 600 mg  600 mg Oral BID Lexi Crespo APRN CNP    600 mg at 11/11/24 0830    heparin lock flush 10 unit/mL injection 5-10 mL  5-10 mL Intracatheter Q24H Lexi Crespo APRN CNP   5 mL at 11/11/24 0440    heparin lock flush 100 unit/mL injection 5-10 mL  5-10 mL Intracatheter Q28 Days Lexi Crespo APRN CNP        insulin aspart (NovoLOG) injection (RAPID ACTING)  1-7 Units Subcutaneous TID AC Lexi Crespo APRN CNP   1 Units at 11/11/24 0831    insulin aspart (NovoLOG) injection (RAPID ACTING)  1-5 Units Subcutaneous At Bedtime Lexi Crespo APRN CNP   1 Units at 11/10/24 2126    metoprolol succinate ER (TOPROL XL) 24 hr tablet 200 mg  200 mg Oral Daily BiggerFracisco garcía DO   200 mg at 11/11/24 0831    pantoprazole (PROTONIX) EC tablet 40 mg  40 mg Oral QAM AC Tigist Yepez MD   40 mg at 11/11/24 0841    polyethylene glycol (MIRALAX) Packet 17 g  17 g Oral BID Jordan Grimes DO   17 g at 11/10/24 0826    predniSONE (DELTASONE) tablet 5 mg  5 mg Oral Daily Lexi Crespo APRN CNP   5 mg at 11/11/24 0831    sodium chloride (PF) 0.9% PF flush 10-20 mL  10-20 mL Intracatheter Q28 Days Lexi Crespo APRN CNP   20 mL at 10/29/24 1714    sodium chloride (PF) 0.9% PF flush 3 mL  3 mL Intracatheter Q8H Lexi Crespo APRN CNP   3 mL at 11/11/24 0839    [Held by provider] spironolactone (ALDACTONE) tablet 50 mg  50 mg Oral Daily Lexi Crespo APRN CNP   50 mg at 10/31/24 0913    [Held by provider] valsartan (DIOVAN) tablet 160 mg  160 mg Oral Daily Lexi Crespo APRN CNP   160 mg at 10/30/24 0816          Allergies:     Allergies   Allergen Reactions    Bee Venom Swelling     Gum swelling    Haemophilus B Polysaccharide Vaccine Other (See Comments)     PN: delirium  fever    Haemophilus Influenzae Nausea and Other (See Comments)     PN: delirium  fever    Other Reaction(s): Fever    PN: delirium  fever   PN: delirium  fever    Pneumococcal Vaccine      Other Reaction(s): *Unknown, adverse reaction           Physical Exam:   Vitals were reviewed  Patient Vitals for the past 24 hrs:   BP Temp Temp src Pulse Resp SpO2   11/11/24 0736 118/52 98  F (36.7  C) Oral 68 -- 99 %   11/11/24 0451 110/48 97.7  F (36.5  C) Oral 71 26 98 %   11/11/24 0044 99/51 98.3  F (36.8  C) Oral 73 17 96 %   11/11/24 0000 -- 98.3  F (36.8  C) Oral 74 19 --   11/10/24 1901 115/50 98.1  F (36.7  C) Oral 71 26 97 %   11/10/24 1515 109/55 98.4  F (36.9  C) Oral 74 17 96 %   11/10/24 1315 113/58 98.4  F (36.9  C) Oral 73 18 97 %   11/10/24 1109 114/53 98.1  F (36.7  C) Oral 74 18 96 %   11/10/24 1030 118/46 98  F (36.7  C) -- 72 19 --   11/10/24 1000 (!) 127/94 97.7  F (36.5  C) Oral 78 22 97 %     Ranges for his vital signs:  Temp:  [97.7  F (36.5  C)-98.4  F (36.9  C)] 98  F (36.7  C)  Pulse:  [68-78] 68  Resp:  [17-26] 26  BP: ()/(46-94) 118/52  SpO2:  [96 %-99 %] 99 %    Physical Examination:  GENERAL:  alert,oriented,  in chair in no acute distress. pale  HEENT:  Head is normocephalic, atraumatic   EYES:  Eyes have anicteric sclerae without conjunctival injection   ENT:  Oropharynx is moist without exudates or ulcers. Tongue is midline  NECK:  Supple. No  Cervical lymphadenopathy  LUNGS:  Clear to auscultation bilateral. decreased breath sounds in bases  CARDIOVASCULAR:  Regular rate and rhythm with 2/6 murmurs, .  ABDOMEN:  Normal bowel sounds, soft, distended. abdominal wall edematous, mild redness on left lower abdominal wall - spreading. bilateral lower wall edema and mild tendernss   SKIN:  Line(s) are in place without any surrounding erythema or exudate. No stigmata of endocarditis.  EXTREMITIES: moderate edema bilateral legs   NEUROLOGIC:  alert, oriented          Laboratory Data:     Inflammatory Markers    Recent Labs   Lab Test 04/16/24  1349 08/15/22  1003   SED 25* 21*   CRP  --  7.9     Hematology Studies    Recent Labs   Lab Test 11/11/24  0439 11/10/24  0425 11/09/24  0402 11/08/24  0641 11/07/24  1747 11/07/24  0433  11/06/24  0512 09/05/24  1021 08/21/24  0634 08/20/24  0606 08/19/24  1714 07/21/21  1238 04/05/21  0950 01/12/21  0838 09/11/20  1619   WBC 5.3 4.6 7.6 7.0  --  6.9 7.9   < > 2.7* 1.4* 2.7*   < > 4.5 4.6 5.0   ANEU  --   --   --   --   --   --   --   --  2.1 1.0* 2.4  --  3.3 3.3 3.4   AEOS  --   --   --   --   --   --   --   --  0.0 0.0 0.0  --  0.1 0.1 0.1   HGB 7.6* 6.8* 7.3* 7.5* 8.6* 7.8* 8.6*   < > 9.1* 8.3* 9.7*   < > 14.1 14.0 14.0   * 104* 103* 105*  --  104* 103*   < > 96 97 96   < > 98 98 99   PLT 51* 44* 49* 46*  --  45* 39*   < > 45* 41* 46*   < > 63* 59* 84*    < > = values in this interval not displayed.     Immune Globulin Studies    Recent Labs   Lab Test 04/16/24  1349      IGM 26*        Metabolic Studies     Recent Labs   Lab Test 11/11/24  0439 11/10/24  0425 11/09/24  0402 11/08/24  0641 11/07/24 0433    140 139 139 138   POTASSIUM 3.8 3.8 3.5 4.0 3.5   CHLORIDE 105 106 103 104 103   CO2 24 26 25 25 27   BUN 60.7* 65.9* 62.5* 58.0* 47.3*   CR 1.41* 1.54* 1.57* 1.54* 1.51*   GFRESTIMATED 53* 47* 46* 47* 48*     Hepatic Studies    Recent Labs   Lab Test 11/11/24  0439 11/10/24  0425 11/09/24 0402 11/08/24  0641 11/07/24  0433 11/06/24  0512   BILITOTAL 0.9 0.8 0.8 0.9 0.7 0.8   ALKPHOS 180* 171* 161* 153* 141 137   ALBUMIN 2.7* 2.7* 2.7* 2.7* 2.6* 2.6*   AST 69* 46* 41 39 35 29   ALT 38 24 22 18 16 13     Thyroid Studies    Recent Labs   Lab Test 07/03/24  1511 06/26/24  0709 04/16/24  1349 04/24/19  1020 08/10/18  0924   TSH 3.22 6.07* 5.38*   < > 4.77*   T4 1.35  --  1.13   < > 1.06   T3  --   --   --   --  104    < > = values in this interval not displayed.     Urine Studies    Recent Labs   Lab Test 11/05/24  2208 04/16/24  1407 02/21/23  0348 07/08/20  1721 11/27/17  1905   LEUKEST Negative Negative Negative Negative Negative   WBCU 1 0  --  0 0     Hepatitis B Testing   Recent Labs   Lab Test 04/16/24  1349   HBCAB Nonreactive   HEPBANG Nonreactive      Hepatitis C Testing     Hepatitis C Antibody   Date Value Ref Range Status   2024 Reactive (A) Nonreactive Final     Comment:     The detection of anti-HCV antibodies indicates a current HCV infection or past infection that has resolved, or false HCV antibody positivity.     The CDC recommends that a reactive result should be followed by Nucleic acid testing for HCV RNA. If HCV RNA is detected, that indicates current HCV infection.    If HCV RNA is not detected, that indicates either past, resolved HCV infection, or false HCV antibody positivity.          Imgain/8/24 CXR  Perihilar predominant interstitial opacities, favored to represent  atelectatic changes. No focal airspace opacity is identified.    10/18/24 CT chest/abdomen/pelvis w contrast   1.  Findings concerning for worsening metastatic disease with multiple new foci of radiotracer uptake within multiple pelvic lymph nodes and osseous foci, as above.  2.  Persistent increased radiotracer uptake within multiple established metastatic pelvic, retroperitoneal (and likely subcarinal), adenopathy.  3.  Persistent increased radiotracer uptake within multiple osseous lesions seen on previous examination, as above.  4.  Cirrhotic liver morphology with sequela of portal venous hypertension, with increasing small to moderate volume diffuse  abdominopelvic ascites.   5.  Slight interval increase in splenomegaly, with overall increased background avidity of the spleen. Etiology of increasing avidity and  size may be due to recent administration of GCSF therapy. Correlate clinically.  6.  Again noted are multiple bilateral hypodense thyroid nodules, which can be further characterized with thyroid ultrasound when  clinically appropriate.  7.  Incidental CT findings, as above

## 2024-11-12 ENCOUNTER — APPOINTMENT (OUTPATIENT)
Dept: OCCUPATIONAL THERAPY | Facility: CLINIC | Age: 74
End: 2024-11-12
Attending: INTERNAL MEDICINE
Payer: COMMERCIAL

## 2024-11-12 LAB
ALBUMIN SERPL BCG-MCNC: 2.8 G/DL (ref 3.5–5.2)
ALP SERPL-CCNC: 190 U/L (ref 40–150)
ALT SERPL W P-5'-P-CCNC: 36 U/L (ref 0–70)
ANION GAP SERPL CALCULATED.3IONS-SCNC: 8 MMOL/L (ref 7–15)
AST SERPL W P-5'-P-CCNC: 62 U/L (ref 0–45)
BILIRUB DIRECT SERPL-MCNC: 0.26 MG/DL (ref 0–0.3)
BILIRUB SERPL-MCNC: 0.8 MG/DL
BUN SERPL-MCNC: 57.1 MG/DL (ref 8–23)
CALCIUM SERPL-MCNC: 8.2 MG/DL (ref 8.8–10.4)
CHLORIDE SERPL-SCNC: 107 MMOL/L (ref 98–107)
CREAT SERPL-MCNC: 1.38 MG/DL (ref 0.67–1.17)
EGFRCR SERPLBLD CKD-EPI 2021: 54 ML/MIN/1.73M2
ERYTHROCYTE [DISTWIDTH] IN BLOOD BY AUTOMATED COUNT: 21.3 % (ref 10–15)
GLUCOSE BLDC GLUCOMTR-MCNC: 125 MG/DL (ref 70–99)
GLUCOSE BLDC GLUCOMTR-MCNC: 148 MG/DL (ref 70–99)
GLUCOSE BLDC GLUCOMTR-MCNC: 210 MG/DL (ref 70–99)
GLUCOSE SERPL-MCNC: 150 MG/DL (ref 70–99)
HCO3 SERPL-SCNC: 25 MMOL/L (ref 22–29)
HCT VFR BLD AUTO: 26.5 % (ref 40–53)
HGB BLD-MCNC: 8.5 G/DL (ref 13.3–17.7)
MAGNESIUM SERPL-MCNC: 2.2 MG/DL (ref 1.7–2.3)
MCH RBC QN AUTO: 32.8 PG (ref 26.5–33)
MCHC RBC AUTO-ENTMCNC: 32.1 G/DL (ref 31.5–36.5)
MCV RBC AUTO: 102 FL (ref 78–100)
PLATELET # BLD AUTO: 52 10E3/UL (ref 150–450)
POTASSIUM SERPL-SCNC: 4.1 MMOL/L (ref 3.4–5.3)
PROT SERPL-MCNC: 4.8 G/DL (ref 6.4–8.3)
RBC # BLD AUTO: 2.59 10E6/UL (ref 4.4–5.9)
SODIUM SERPL-SCNC: 140 MMOL/L (ref 135–145)
WBC # BLD AUTO: 5.4 10E3/UL (ref 4–11)

## 2024-11-12 PROCEDURE — 97140 MANUAL THERAPY 1/> REGIONS: CPT | Mod: GO

## 2024-11-12 PROCEDURE — 85041 AUTOMATED RBC COUNT: CPT | Performed by: NURSE PRACTITIONER

## 2024-11-12 PROCEDURE — 97535 SELF CARE MNGMENT TRAINING: CPT | Mod: GO

## 2024-11-12 PROCEDURE — 250N000013 HC RX MED GY IP 250 OP 250 PS 637: Performed by: INTERNAL MEDICINE

## 2024-11-12 PROCEDURE — 250N000011 HC RX IP 250 OP 636: Performed by: NURSE PRACTITIONER

## 2024-11-12 PROCEDURE — 250N000012 HC RX MED GY IP 250 OP 636 PS 637: Performed by: NURSE PRACTITIONER

## 2024-11-12 PROCEDURE — 120N000005 HC R&B MS OVERFLOW UMMC

## 2024-11-12 PROCEDURE — 250N000013 HC RX MED GY IP 250 OP 250 PS 637: Performed by: NURSE PRACTITIONER

## 2024-11-12 PROCEDURE — 82248 BILIRUBIN DIRECT: CPT | Performed by: NURSE PRACTITIONER

## 2024-11-12 PROCEDURE — 82947 ASSAY GLUCOSE BLOOD QUANT: CPT | Performed by: STUDENT IN AN ORGANIZED HEALTH CARE EDUCATION/TRAINING PROGRAM

## 2024-11-12 PROCEDURE — 97110 THERAPEUTIC EXERCISES: CPT | Mod: GO

## 2024-11-12 PROCEDURE — 99232 SBSQ HOSP IP/OBS MODERATE 35: CPT | Performed by: STUDENT IN AN ORGANIZED HEALTH CARE EDUCATION/TRAINING PROGRAM

## 2024-11-12 PROCEDURE — 84295 ASSAY OF SERUM SODIUM: CPT | Performed by: STUDENT IN AN ORGANIZED HEALTH CARE EDUCATION/TRAINING PROGRAM

## 2024-11-12 PROCEDURE — 83735 ASSAY OF MAGNESIUM: CPT | Performed by: PEDIATRICS

## 2024-11-12 PROCEDURE — 82040 ASSAY OF SERUM ALBUMIN: CPT | Performed by: NURSE PRACTITIONER

## 2024-11-12 PROCEDURE — 85018 HEMOGLOBIN: CPT | Performed by: NURSE PRACTITIONER

## 2024-11-12 PROCEDURE — 84520 ASSAY OF UREA NITROGEN: CPT | Performed by: STUDENT IN AN ORGANIZED HEALTH CARE EDUCATION/TRAINING PROGRAM

## 2024-11-12 PROCEDURE — 250N000013 HC RX MED GY IP 250 OP 250 PS 637: Performed by: PEDIATRICS

## 2024-11-12 RX ADMIN — PREDNISONE 5 MG: 5 TABLET ORAL at 08:53

## 2024-11-12 RX ADMIN — PANTOPRAZOLE SODIUM 40 MG: 40 TABLET, DELAYED RELEASE ORAL at 08:53

## 2024-11-12 RX ADMIN — HEPARIN, PORCINE (PF) 10 UNIT/ML INTRAVENOUS SYRINGE 5 ML: at 04:47

## 2024-11-12 RX ADMIN — METOPROLOL SUCCINATE 200 MG: 50 TABLET, EXTENDED RELEASE ORAL at 08:52

## 2024-11-12 RX ADMIN — INSULIN ASPART 2 UNITS: 100 INJECTION, SOLUTION INTRAVENOUS; SUBCUTANEOUS at 13:54

## 2024-11-12 RX ADMIN — Medication 950 MG: at 08:52

## 2024-11-12 RX ADMIN — CIPROFLOXACIN 500 MG: 500 TABLET ORAL at 08:51

## 2024-11-12 RX ADMIN — ACETAMINOPHEN 650 MG: 325 TABLET, FILM COATED ORAL at 21:29

## 2024-11-12 RX ADMIN — OXYCODONE HYDROCHLORIDE 10 MG: 5 TABLET ORAL at 21:29

## 2024-11-12 RX ADMIN — ASPIRIN 81 MG CHEWABLE TABLET 81 MG: 81 TABLET CHEWABLE at 08:51

## 2024-11-12 NOTE — PROGRESS NOTES
GASTROENTEROLOGY PROGRESS NOTE    Date: 11/12/2024     ASSESSMENT:  Gucci Logan is a 74 year old male with a history significant for MASLD/HepC s/p IFN/Ribavarin/Boceprevir related compensated cirrhosis, Metastatic Prostate Ca s/p Rad now combination ADT/Docetaxel, HCM c/b HFpEF who was admitted for acute on chronic systolic heart failure and new onset ascites complicated by spontaneous bacterial peritonitis.        #BRBPR  #Reports of melena    Patient reported black, tarry stool and black stools noted in nursing notes; saw a picture- stool looked brown/green w/ small amount of dark red blood on top; HD stable, needed a blood transfusion overnight; lEGD 11/11 showed PHG w/ small varices, no etiology of bleeding found; no further black stools noted and Hgb stable     # New Ascites complicated by SBP   # RENNY (baseline Cr 1)  Patient with new onset ascites; diagnostic fluid studies consistent with portal hypertensive ascites 2/2 cirrhosis and cell ct c.w. SBP. Ceftriaxone 2 g daily started 10/30/24, repeat para also positive 11/2, switched abx to Zosyn; repeat para 11/6 was improved (580->558->452); Cr on admit 1.69, now 1.38     #. Decompensated Cirrhosis   Primary Hepatologist: Dr. Renteria   Etiology: HCV/MASLD   Ascites: + clinically.   - on 50mg spironolactone, 2mg Bumex OP  - SBP history: + hx (dx 10/30/24) , repeat para also positive 11/2, switched abx to Zosyn; repeat para 11/6 was improved (580->558->452); completed 5 days of Zosyn per ID; will need ppx   Hepatic encephalopathy: PTA lactulose and rifaximin  EV: EGD 5/2023 without varices   TIPS: n/a   PV: Patent on US 8/19/24   HCC: CT A/P 10/18/24 without concerning lesions      MELD 3.0: 11 at 8/21/2024  6:34 AM  MELD-Na: 10 at 8/21/2024  6:34 AM  Calculated from:  Serum Creatinine: 1.04 mg/dL at 8/21/2024  6:34 AM  Serum Sodium: 140 mmol/L (Using max of 137 mmol/L) at 8/21/2024  6:34 AM  Total Bilirubin: 1.1 mg/dL at 8/20/2024  6:06 AM  Serum  Albumin: 2.7 g/dL at 8/20/2024  6:06 AM  INR(ratio): 1.32 at 8/19/2024  5:14 PM  Age at listing (hypothetical): 74 years  Sex: Male at 8/21/2024  6:34 AM      RECOMMENDATIONS:  - Okay to resume Bumex from the GI perspective  - Cont cipro ppx    Thank you for involving us in this patient's care. Please do not hesitate to contact the GI service with any questions or concerns.      Pt care plan discussed with Dr. Keating, GI staff physician.    Amie Severino   Gastroenterology Fellow  _______________________________________________________________    Subjective: patient reports 3 loose stools yesterday which were brown; SOB w/ exertion is improved     Objective:  Blood pressure 114/58, pulse 69, temperature 97.5  F (36.4  C), temperature source Oral, resp. rate 14, weight 106.5 kg (234 lb 12.8 oz), SpO2 97%.    General: NAD    Lungs: on RA, comfortable  Abdomen: soft, distended, NT  Extremities: no edema, erythematous rash bl anterior legs  Musculoskeletal: No gross deformity.  Skin: No jaundice   Mental status/Psych: A&O. Asks/answers questions appropriately. Pleasant, interactive     LABS:  [facility]  Recent Labs   Lab 11/12/24  0830 11/12/24  0448 11/11/24  2155 11/11/24  1631 11/11/24  0809 11/11/24  0439 11/10/24  0905 11/10/24  0425 11/09/24  0846 11/09/24  0402   NA  --  140  --   --   --  139  --  140  --  139   POTASSIUM  --  4.1  --   --   --  3.8  --  3.8  --  3.5   CHLORIDE  --  107  --   --   --  105  --  106  --  103   SOLEDAD  --  8.2*  --   --   --  8.2*  --  7.9*  --  7.9*   CO2  --  25  --   --   --  24  --  26  --  25   BUN  --  57.1*  --   --   --  60.7*  --  65.9*  --  62.5*   CR  --  1.38*  --   --   --  1.41*  --  1.54*  --  1.57*   * 150* 165* 142*   < > 152*   < > 128*   < > 132*    < > = values in this interval not displayed.     CBC  Recent Labs   Lab 11/12/24  0448 11/11/24  1939 11/11/24  0439 11/10/24  0425 11/09/24  0402   WBC 5.4  --  5.3 4.6 7.6   RBC 2.59*  --  2.33* 2.05* 2.26*   HGB 8.5*   <  > 7.6* 6.8* 7.3*   HCT 26.5*  --  23.8* 21.4* 23.3*   *  --  102* 104* 103*   MCH 32.8  --  32.6 33.2* 32.3   MCHC 32.1  --  31.9 31.8 31.3*   RDW 21.3*  --  21.4* 20.7* 21.1*   PLT 52*  --  51* 44* 49*    < > = values in this interval not displayed.     INRNo lab results found in last 7 days.  LFTs  Recent Labs   Lab 11/12/24  0448 11/11/24  0439 11/10/24  0425 11/09/24  0402   ALKPHOS 190* 180* 171* 161*   AST 62* 69* 46* 41   ALT 36 38 24 22   BILITOTAL 0.8 0.9 0.8 0.8   PROTTOTAL 4.8* 4.6* 4.6* 4.7*   ALBUMIN 2.8* 2.7* 2.7* 2.7*      PANCNo lab results found in last 7 days.

## 2024-11-12 NOTE — PROGRESS NOTES
/58   Pulse 62   Temp 97.4  F (36.3  C)   Resp 15   Wt 106.5 kg (234 lb 12.8 oz)   SpO2 99%   BMI 33.69 kg/m      [id number]  Neuro: A&Ox4. Ruby.  Cardiac: Afebrile, VSS.   Respiratory: RA.MCKENNA.  GI/: Voiding spontaneously in urinal. LBM 11/11, dark, tarry stool x1.   Diet/appetite: Tolerating regular diet 2L FR. Denies nausea.  Endocrine:T2DM ACHS blood sugars.  Activity: Up Ax1 w/GB and walker.    Pain: Generalized. L hip, back, R knee managed by oxycodone and tylenol.   Skin: Edema in BLE's, lymph wraps on.  Lines: R Port-A-Cath SL.  Pertinent shift updates:1U PRBC's given for Hg of 7.6. Hgb to be rechecked by NOC RN. VQ scan to r/o PE completed. EGD completed w.no GI bleed detected.    Notify [facility] for new concerns.   Plan: Awaiting TCU acceptance.

## 2024-11-12 NOTE — PLAN OF CARE
Temp: 98  F (36.7  C) Temp src: Oral BP: 115/59 Pulse: 68   Resp: 14 SpO2: 98 % O2 Device: None (Room air) Oxygen Delivery: 2 LPM     Hours of care: 9052-7737    D: Admitted on 10/29 for decompensated heart failure and whose hospital course has been prolonged in the setting of decompensated cirrhosis and SBP.     I/A: Canelo (he/him) is A/O x4. Calls appropriately, calm and cooperative. Tele in place, SR, HR 60s. VSS on RA, some MCKENNA. R port a cath in place heparin locked. Sacrum/coccyx wound with mepilex, CDI. No new skin deficits noted. Tolerating regular diet with 1.5L fluid restriction. Up Ax1 with walker. Adequate UOP via urinal, no BM this shift. Appeared to sleep well overnight.    Changed:  Running:   PRN: oxy x1, tylenol x1    P: Continue to monitor and follow POC. Awaiting TCU placement. Notify Gold 7 with changes.    Goal Outcome Evaluation:    Plan of Care Reviewed With: patient  Overall Patient Progress: no change  Outcome Evaluation: VSS on RA. Pain controlled on current regimen. Awaiting TCU placement.

## 2024-11-12 NOTE — PLAN OF CARE
Shift cares 7992-3210    BP soft but within vital parameters. Other VSS.    Neuro: A&O x4. No deficits noted during shift.  Circulatory: Electrolyte protocols run, but no replacements given.  Respiratory: Sating >98% on RA.  GI/: Adequate urine output. BM X3 this shift, all three loose and brown.  Diet/appetite: Tolerating regular diet. Eating fair. Educated patient on checking blood glucose prior to eating.  Activity:  Assist of 1, up to chair. In halls with OT.  Pain: At acceptable level on current regimen. Denied medication intervention for pain.  Skin: No new deficits noted. Lymphedema wraps managed by lymph team.  LDA's: Port-a-cath, heparin locked.    Plan: Continue with POC, awaiting TCU placement. Notify primary team with changes.        Problem: Heart Failure  Goal: Fluid and Electrolyte Balance  Outcome: Progressing  Intervention: Monitor and Manage Fluid and Electrolyte Balance  Flowsheets  Taken 11/12/2024 1200  Fluid/Electrolyte Management: fluids restricted  Taken 11/12/2024 0800  Fluid/Electrolyte Management: fluids restricted     Problem: Fall Injury Risk  Goal: Absence of Fall and Fall-Related Injury  Intervention: Promote Injury-Free Environment  Flowsheets  Taken 11/12/2024 1200  Safety Promotion/Fall Prevention:   assistive device/personal items within reach   clutter free environment maintained   nonskid shoes/slippers when out of bed   mobility aid in reach   patient and family education   room near nurse's station   safety round/check completed  Taken 11/12/2024 0800  Safety Promotion/Fall Prevention:   assistive device/personal items within reach   clutter free environment maintained   nonskid shoes/slippers when out of bed   mobility aid in reach   patient and family education   room near nurse's station   safety round/check completed

## 2024-11-12 NOTE — PROGRESS NOTES
[facility]    Medicine Progress Note - Hospitalist Service, GOLD TEAM 7    Date of Admission:  10/29/2024    Assessment & Plan   Shortness of Breath with Exertion  - high risk for DVT/PE given immobility, age, cancer diagnosis, but he's not tachycardic or hypoxemic and his Lander Criteria score is only 3 (8% risk); plt are low so DVT ppx has been on hold, though he is on aspirin  - leading diagnosis at this point is exacerbation of HCM due to over-diuresis; cardiology was reconsulted and is working on up-titrating his metoprolol (dose had been decreased a bit when he was getting actively diuresed on cards service) --> cards to recheck echo today  - VQ scan from 11/11/24 negative  - this has improved significantly and seems to have all but resolved with a higher dose of metoprolol + transfusion    Spontaneous Bacterial Peritonitis  Cirrhosis 2/2 HCV, Newly Decompensated, c/b ascites, SBP  Completed treatment with zosyn, now on ciprofloxacin ppx, see ID note from 11/7     RENNY, suspect prerenal, improved but stalled out Cr around 1.5-1.6  CKD Stage III  Baseline Cr ~0.8-1 recently  - diuretics on hold, will consider adding back maintence 11/12/24  - urine sodium low and urine concentrated 11/5, Cr improving with holding diuretic.      BRBPR 11/7  - likely hemorrhoidal given presence of internal hemorrhoids on rectal exam 11/8; hgb checked soon after was stable and vitals have shown consistent stability    Anemia of chronic disease, + possible small volume intraperitoneal bleed following paracentesis  - last tap was serosanguinous and hgb dropped about a gram overall; transfused 1 unit 11/10  - iron studies don't implicate deficiency; likely degree of splenic sequestration given plt count and liver disease  - continue to monitor for signs/symptoms consistent with GIB  - ok to transfuse for hgb < 7; cardiology recommending higher threshold of 8 g - discussed with patient and  will go ahead and give another unit given persistent light headedness with position change  - GI scoped 11/11/24- small varices but no source of GIB; sticking with hypothesis that he had a small intraperitoneal bleed s/p paracentesis which has resolved    Hypomagnesemia   ISO RENNY as above. RN protocol for replacement.     HCM with EDGAR with Valsalva LVOTO Gradient  Acute-on-Chronic HFpEF  HTN  Longstanding hx of HCM, recently established care w/ [facility] for HF. Has been struggling w/ worsening BLE edema, increased abdominal distension (weeping ascites), from poorly controlled hypervolemia. PTA diuretic regimen w/ Spironolactone was deemed inadequate, and given his difficulties w/ mobility at home and need for frequent appts/labs w/ titration of OP diuresis, decision was made to directly admit him instead for more aggressive titration of diuretic regimen w/ close cardiac monitoring. Started on Bumex drip here by Cards. Repeat ECHO 10/30/24 showing hyperkinetic w/ EF 65-70%, dynamic outflow tract obstruction, peak gradient 119mmHg at rest, grade II moderate diastolic dysfunction - on most recent echo 11/8 this gradient was lower (80) indicative of probable over-estimation on prior. Transferred to Medicine service 10/31 given SBP.              - Spironolactone and Bumex drip stopped 10/31; restart a daily bumex likely tomorrow pending cards recs              - Hold PTA Valsartan 160mg daily w/ low BP, worsening LVOT gradient   - reduced PTA Metoprolol succinate 150mg daily to 125 mg daily, back up to 150 mg as of 11/8, 200 mg as of 11/9    - Avoid vasodilators               - Daily standing weights (per pt, a 'good' weight for him is 230 lbs)     Metastatic Prostate Cancer  Chronic Pancytopenia   Initially diagnosed earlier this year incidentally following a fall at home, fracturing his L femur. ORIF 05/24/24 and curretage samples c/w adenocarcinoma of prostate origin. Has since followed w/ Oncology as an OP, last  "saw 10/24/24. PTA on Daralutamide BID, Docetaxel (C4D1 was on 10/24/24) and Leuprolide U3qfovcl (last 9/5/24). Also gets Neulasta, last 10/25. Has chronic pancytopenia, likely d/t multiple comorbidities (cirrhosis, CKD) and iatrogenic (chemotherapy). Baseline hgb ~8-9 recently, last few days in 7's  - Oncology consulted here 10/31 and recommended to continue Daralutamide, they signed off  - patient tells me his next infusion is scheduled for Thursday  - Continue PTA Prednisone   - Transfuse for Hgb <7      Type 2 Diabetes Mellitus, non-insulin-dependent, well-controlled  Last A1c 5.8% on 10/29/24. PTA Metformin. BG checks past 24 hours are all within goal (<180).   - Hold PTA Metformin given RENNY as above  - Continue medium sliding scale as started on admission  - BG checks TID AC + at bedtime + 0200  - Hypoglycemia protocol    Billing    MDM: high  See problem list above  Reviewed CBC, CMP, V/Q scan results, reviewed op note from EGD yesterday   Discussed case with care management and discharge planning.           Diet: Regular Diet Adult  Snacks/Supplements Adult: Other; If pt would like a snack or suppelment, please send; With Meals  Snacks/Supplements Adult: [employer]; Between Meals  Fluid restriction 1500 ML FLUID    DVT Prophylaxis: none due to bleeding risk  Rankin Catheter: Not present  Lines: PRESENT      Port a Cath 08/12/24 Single Lumen Right-Site Assessment: WDL      Cardiac Monitoring: None  Code Status: Full Code      Clinically Significant Risk Factors               # Hypoalbuminemia: Lowest albumin = 2.5 g/dL at 10/30/2024  8:33 AM, will monitor as appropriate   # Thrombocytopenia: Lowest platelets = 51 in last 2 days, will monitor for bleeding   # Hypertension: Noted on problem list            # Obesity: Estimated body mass index is 33.69 kg/m  as calculated from the following:    Height as of 10/24/24: 1.778 m (5' 10\").    Weight as of this encounter: 106.5 kg (234 lb 12.8 oz).      # " Financial/Environmental Concerns: none         Disposition Plan     Medically Ready for Discharge: Anticipated in 2-4 Days      Saumya Torres,   Hospitalist Service, GOLD TEAM 7  [facility]  Securely message with [employer] (more info)  Text page via [employer] Paging/Directory   See signed in provider for up to date coverage information  ______________________________________________________________________    Interval History   No acute overnight events. Canelo feels he can ambulate better without feeling lightheaded. Shortness of breath has largely improved. Feels his abdomen is feeling more full, but not to the level where he has needed paracentesis in the past. Still feels that abdomen has some bounce to it.     Physical Exam   Vital Signs: Temp: 98  F (36.7  C) Temp src: Oral BP: 115/59 Pulse: 68   Resp: 14 SpO2: 98 % O2 Device: None (Room air) Oxygen Delivery: 2 LPM  Weight: 234 lbs 12.8 oz    General Appearance: Appears chronically unwell, but no acute distress, sitting in recliner  Respiratory: clear to auscultation bilaterally, normal RR, no rales  Cardiovascular: heart rate is regular and has a pulse in 60s  GI: abdomen is distended but still soft, nontender   Skin: lower extremities have lymphedema wraps in place which I did not take down at this time.      Medical Decision Making       45 MINUTES SPENT BY ME on the date of service doing chart review, history, exam, documentation & further activities per the note.      Data     I have personally reviewed the following data over the past 24 hrs:    5.4  \   8.5 (L)   / 52 (L)     140 107 57.1 (H) /  148 (H)   4.1 25 1.38 (H) \     ALT: 36 AST: 62 (H) AP: 190 (H) TBILI: 0.8   ALB: 2.8 (L) TOT PROTEIN: 4.8 (L) LIPASE: N/A       Imaging results reviewed over the past 24 hrs:   Recent Results (from the past 24 hours)   NM Lung Scan Ventilation and Perfusion    Narrative    Examination:NM LUNG SCAN VENTILATION AND  PERFUSION, 11/11/2024 2:43 PM      Indication:  exertional dyspnea w multiple risk factors for PE  (cancer, immobility, obesity, cirrhosis, age)     Additional Information: none    D-Dimer: None    Technique:    The patient received 2 mCi of Tc-99m DTPA aerosol for ventilation and  6.9 mCi of Tc-99m MAA for perfusion. Multiple images were obtained of  the lungs in Anterior, posterior, WELLS, RPO, LPO, and  Australian projections.    Comparison: Chest x-ray from 11/8/2024    Findings:    The ventilation study demonstrates normal ventilation, with some  central airway deposition.    The perfusion study demonstrates perfusion of all ventilated areas.      Impression    Impression:    1. Pulmonary emboli is not present.    I have personally reviewed the examination and initial interpretation  and I agree with the findings.    GREG LICEA MD         SYSTEM ID:  [id number]   XR Chest 2 Views    Narrative    Exam: XR CHEST 2 VIEWS, 11/11/2024 2:48 PM    Indication: exertional dyspnea with multiple risk factors    Comparison: Chest x-ray 11/8/2024; 9/5/2024    Findings:   Frontal and lateral view x-ray of the chest. Right IJ Port-A-Cath tip  projecting over the expected location of superior cavoatrial junction.  No pneumothorax. No effusion. Stable cardiac mediastinal  silhouette.Mild bibasilar streaky opacities.      Impression    Impression: Mild bibasilar streaky opacities, nonspecific, possibly  atelectatic/edema.    MONSERRAT MCCORMICK MD         SYSTEM ID:  [id number]

## 2024-11-12 NOTE — PROGRESS NOTES
Care Management Follow Up    Length of Stay (days): 14    Expected Discharge Date: 11/13/2024     Concerns to be Addressed: discharge planning     Patient plan of care discussed at interdisciplinary rounds: Yes    Anticipated Discharge Disposition: TCU  Anticipated Discharge Services: TCU therapy services  Anticipated Discharge DME: n/a    Patient/family educated on Medicare website which has current facility and service quality ratings:  yes  Education Provided on the Discharge Plan:  yes  Patient/Family in Agreement with the Plan: yes    Referrals Placed by CM/SW:    DECLINED  - 11/4: [facility] (cost of cancer treatment)  - 11/5: [facility] (can't meet pt's needs)  - 11/6: [facility] (no beds)  - 11/7: [facility] (no beds)  - 11/7: [facility] (high cost of chemo meds)  - 11/11: [facility] (only take neuro patients)     PENDING  [facility]  [address] [place] [id number]  4th floor of building  Phone: [phone]  Nurse to Nurse: [phone]  Private pay costs discussed: Not applicable    Discussed  Partnership in Safe Discharge Planning  document with patient/family: No     Handoff Completed: No, handoff not indicated or clinically appropriate    Additional Information:  MITUL called IR clinic (Phone: [phone]) and spoke with staff about request for 1x/week OP paracentesis appts for 4 sessions total.     Later on, MITUL and RNCC (Edin) called the [facility] charge RN and per her, pt needs a therapy plan put in with standing orders.     MITUL got a call from an IR care coordinator and later on a staff message on Epic with the following information --- the provider would place the therapy plan order set and then MITUL would call the [employer] phone line at [phone], then use the option for [facility] (infusion).      MITUL informed the Gold 7 provider to place therapy plan in.    Next Steps: Once therapy plan is in, MITUL to call to schedule appts.     MITUL will continue to  follow and assist as needed.     ___________________    IVANIA Lloyd, [facility]  6C , covering beds 6401 to 6519  [facility]   Phone: [phone]  [address] [place]ia [place]

## 2024-11-13 ENCOUNTER — APPOINTMENT (OUTPATIENT)
Dept: OCCUPATIONAL THERAPY | Facility: CLINIC | Age: 74
End: 2024-11-13
Attending: INTERNAL MEDICINE
Payer: COMMERCIAL

## 2024-11-13 ENCOUNTER — PATIENT OUTREACH (OUTPATIENT)
Dept: ONCOLOGY | Facility: CLINIC | Age: 74
End: 2024-11-13
Payer: COMMERCIAL

## 2024-11-13 LAB
ALBUMIN SERPL BCG-MCNC: 2.7 G/DL (ref 3.5–5.2)
ALP SERPL-CCNC: 182 U/L (ref 40–150)
ALT SERPL W P-5'-P-CCNC: 32 U/L (ref 0–70)
ANION GAP SERPL CALCULATED.3IONS-SCNC: 7 MMOL/L (ref 7–15)
AST SERPL W P-5'-P-CCNC: 51 U/L (ref 0–45)
BILIRUB DIRECT SERPL-MCNC: 0.26 MG/DL (ref 0–0.3)
BILIRUB SERPL-MCNC: 0.8 MG/DL
BUN SERPL-MCNC: 49.6 MG/DL (ref 8–23)
CALCIUM SERPL-MCNC: 8 MG/DL (ref 8.8–10.4)
CHLORIDE SERPL-SCNC: 107 MMOL/L (ref 98–107)
CREAT SERPL-MCNC: 1.34 MG/DL (ref 0.67–1.17)
EGFRCR SERPLBLD CKD-EPI 2021: 56 ML/MIN/1.73M2
ERYTHROCYTE [DISTWIDTH] IN BLOOD BY AUTOMATED COUNT: 21.2 % (ref 10–15)
GLUCOSE BLDC GLUCOMTR-MCNC: 125 MG/DL (ref 70–99)
GLUCOSE BLDC GLUCOMTR-MCNC: 161 MG/DL (ref 70–99)
GLUCOSE BLDC GLUCOMTR-MCNC: 177 MG/DL (ref 70–99)
GLUCOSE BLDC GLUCOMTR-MCNC: 216 MG/DL (ref 70–99)
GLUCOSE SERPL-MCNC: 121 MG/DL (ref 70–99)
HCO3 SERPL-SCNC: 25 MMOL/L (ref 22–29)
HCT VFR BLD AUTO: 25.4 % (ref 40–53)
HGB BLD-MCNC: 8 G/DL (ref 13.3–17.7)
MAGNESIUM SERPL-MCNC: 2.1 MG/DL (ref 1.7–2.3)
MCH RBC QN AUTO: 32.7 PG (ref 26.5–33)
MCHC RBC AUTO-ENTMCNC: 31.5 G/DL (ref 31.5–36.5)
MCV RBC AUTO: 104 FL (ref 78–100)
PLATELET # BLD AUTO: 54 10E3/UL (ref 150–450)
POTASSIUM SERPL-SCNC: 4 MMOL/L (ref 3.4–5.3)
PROT SERPL-MCNC: 4.6 G/DL (ref 6.4–8.3)
RBC # BLD AUTO: 2.45 10E6/UL (ref 4.4–5.9)
SODIUM SERPL-SCNC: 139 MMOL/L (ref 135–145)
WBC # BLD AUTO: 4.9 10E3/UL (ref 4–11)

## 2024-11-13 PROCEDURE — 85014 HEMATOCRIT: CPT | Performed by: NURSE PRACTITIONER

## 2024-11-13 PROCEDURE — 250N000011 HC RX IP 250 OP 636: Performed by: NURSE PRACTITIONER

## 2024-11-13 PROCEDURE — 250N000013 HC RX MED GY IP 250 OP 250 PS 637: Performed by: PEDIATRICS

## 2024-11-13 PROCEDURE — 84155 ASSAY OF PROTEIN SERUM: CPT | Performed by: NURSE PRACTITIONER

## 2024-11-13 PROCEDURE — 82565 ASSAY OF CREATININE: CPT | Performed by: STUDENT IN AN ORGANIZED HEALTH CARE EDUCATION/TRAINING PROGRAM

## 2024-11-13 PROCEDURE — 97535 SELF CARE MNGMENT TRAINING: CPT | Mod: GO

## 2024-11-13 PROCEDURE — 80048 BASIC METABOLIC PNL TOTAL CA: CPT | Performed by: STUDENT IN AN ORGANIZED HEALTH CARE EDUCATION/TRAINING PROGRAM

## 2024-11-13 PROCEDURE — 80053 COMPREHEN METABOLIC PANEL: CPT | Performed by: NURSE PRACTITIONER

## 2024-11-13 PROCEDURE — 83735 ASSAY OF MAGNESIUM: CPT | Performed by: PEDIATRICS

## 2024-11-13 PROCEDURE — 120N000005 HC R&B MS OVERFLOW UMMC

## 2024-11-13 PROCEDURE — 250N000013 HC RX MED GY IP 250 OP 250 PS 637: Performed by: INTERNAL MEDICINE

## 2024-11-13 PROCEDURE — 99231 SBSQ HOSP IP/OBS SF/LOW 25: CPT | Performed by: STUDENT IN AN ORGANIZED HEALTH CARE EDUCATION/TRAINING PROGRAM

## 2024-11-13 PROCEDURE — 97110 THERAPEUTIC EXERCISES: CPT | Mod: GO | Performed by: OCCUPATIONAL THERAPIST

## 2024-11-13 PROCEDURE — 250N000012 HC RX MED GY IP 250 OP 636 PS 637: Performed by: NURSE PRACTITIONER

## 2024-11-13 PROCEDURE — 250N000013 HC RX MED GY IP 250 OP 250 PS 637: Performed by: NURSE PRACTITIONER

## 2024-11-13 RX ORDER — ALBUTEROL SULFATE 90 UG/1
1-2 INHALANT RESPIRATORY (INHALATION)
Status: CANCELLED
Start: 2024-11-22

## 2024-11-13 RX ORDER — METHYLPREDNISOLONE SODIUM SUCCINATE 40 MG/ML
40 INJECTION INTRAMUSCULAR; INTRAVENOUS
Status: CANCELLED
Start: 2024-11-22

## 2024-11-13 RX ORDER — HEPARIN SODIUM (PORCINE) LOCK FLUSH IV SOLN 100 UNIT/ML 100 UNIT/ML
5 SOLUTION INTRAVENOUS
Status: CANCELLED | OUTPATIENT
Start: 2024-11-22

## 2024-11-13 RX ORDER — HEPARIN SODIUM,PORCINE 10 UNIT/ML
5-20 VIAL (ML) INTRAVENOUS DAILY PRN
Status: CANCELLED | OUTPATIENT
Start: 2024-11-22

## 2024-11-13 RX ORDER — ALBUMIN (HUMAN) 12.5 G/50ML
12.5 SOLUTION INTRAVENOUS
Status: CANCELLED | OUTPATIENT
Start: 2024-11-22

## 2024-11-13 RX ORDER — LIDOCAINE HYDROCHLORIDE 10 MG/ML
20 INJECTION, SOLUTION EPIDURAL; INFILTRATION; INTRACAUDAL; PERINEURAL ONCE
Status: CANCELLED | OUTPATIENT
Start: 2024-11-22 | End: 2024-11-22

## 2024-11-13 RX ORDER — MEPERIDINE HYDROCHLORIDE 25 MG/ML
25 INJECTION INTRAMUSCULAR; INTRAVENOUS; SUBCUTANEOUS
Status: CANCELLED | OUTPATIENT
Start: 2024-11-22

## 2024-11-13 RX ORDER — DIPHENHYDRAMINE HYDROCHLORIDE 50 MG/ML
25 INJECTION INTRAMUSCULAR; INTRAVENOUS
Status: CANCELLED
Start: 2024-11-22

## 2024-11-13 RX ORDER — DIPHENHYDRAMINE HYDROCHLORIDE 50 MG/ML
50 INJECTION INTRAMUSCULAR; INTRAVENOUS
Status: CANCELLED
Start: 2024-11-22

## 2024-11-13 RX ORDER — EPINEPHRINE 1 MG/ML
0.3 INJECTION, SOLUTION, CONCENTRATE INTRAVENOUS EVERY 5 MIN PRN
Status: CANCELLED | OUTPATIENT
Start: 2024-11-22

## 2024-11-13 RX ORDER — ALBUTEROL SULFATE 0.83 MG/ML
2.5 SOLUTION RESPIRATORY (INHALATION)
Status: CANCELLED | OUTPATIENT
Start: 2024-11-22

## 2024-11-13 RX ADMIN — INSULIN ASPART 1 UNITS: 100 INJECTION, SOLUTION INTRAVENOUS; SUBCUTANEOUS at 20:13

## 2024-11-13 RX ADMIN — OXYCODONE HYDROCHLORIDE 5 MG: 5 TABLET ORAL at 21:34

## 2024-11-13 RX ADMIN — PREDNISONE 5 MG: 5 TABLET ORAL at 08:45

## 2024-11-13 RX ADMIN — OXYCODONE HYDROCHLORIDE 5 MG: 5 TABLET ORAL at 15:26

## 2024-11-13 RX ADMIN — METOPROLOL SUCCINATE 200 MG: 50 TABLET, EXTENDED RELEASE ORAL at 08:45

## 2024-11-13 RX ADMIN — Medication 950 MG: at 11:09

## 2024-11-13 RX ADMIN — ASPIRIN 81 MG CHEWABLE TABLET 81 MG: 81 TABLET CHEWABLE at 08:46

## 2024-11-13 RX ADMIN — HEPARIN, PORCINE (PF) 10 UNIT/ML INTRAVENOUS SYRINGE 5 ML: at 05:21

## 2024-11-13 RX ADMIN — CIPROFLOXACIN 500 MG: 500 TABLET ORAL at 08:46

## 2024-11-13 RX ADMIN — ACETAMINOPHEN 650 MG: 325 TABLET, FILM COATED ORAL at 15:25

## 2024-11-13 RX ADMIN — ACETAMINOPHEN 650 MG: 325 TABLET, FILM COATED ORAL at 21:34

## 2024-11-13 RX ADMIN — PANTOPRAZOLE SODIUM 40 MG: 40 TABLET, DELAYED RELEASE ORAL at 08:45

## 2024-11-13 NOTE — PROGRESS NOTES
[facility]: Cancer Care                                                                                          Patient called & LM inquiring about infusion scheduled for 11/14.  Called patient backed, reached  & unable to LM due to full mailbox.    Infusion will need to be cancelled, message sent to care team.    Piedad SEBASTIAN RN  Cancer Care Coordinator  [facility]

## 2024-11-13 NOTE — PROGRESS NOTES
Care Management Follow Up    Length of Stay (days): 15    Expected Discharge Date: 11/14/2024     Concerns to be Addressed: discharge planning     Patient plan of care discussed at interdisciplinary rounds: Yes    Anticipated Discharge Disposition: TCU  Anticipated Discharge Services: TCU therapy services  Anticipated Discharge DME: n/a    Patient/family educated on Medicare website which has current facility and service quality ratings:  yes  Education Provided on the Discharge Plan:  yes  Patient/Family in Agreement with the Plan: yes    Referrals Placed by CM/SW:    DECLINED  - 11/4: [facility] (cost of cancer treatment)  - 11/5: [facility] (can't meet pt's needs)  - 11/6: [facility] (no beds)  - 11/7: [facility] (no beds)  - 11/7: [facility] (high cost of chemo meds)  - 11/11: [facility] (only take neuro patients)     PENDING  [facility]  [address] [place] [address]  4th floor of building  Phone: [phone]  Nurse to Nurse: [phone]  - 11/13: Per Carole in  Rehab admissions to , pts at [facility] would go to IR at [facility] and wouldn't need OP appts for paracentesis set up at [facility]. So no need for  to set up appts.     Private pay costs discussed: Not applicable    Discussed  Partnership in Safe Discharge Planning  document with patient/family: No     Handoff Completed: No, handoff not indicated or clinically appropriate    Additional Information:  Pt is close to medically ready and awaiting a bed at [facility].    ___________________    IVANIA Lloyd, [facility]  6C , covering beds 6401 to 6596  [facility]   Phone: [phone]  6C Cards MITUL [place]

## 2024-11-13 NOTE — PLAN OF CARE
Temp: 98.1  F (36.7  C) Temp src: Oral BP: 108/57 Pulse: 62   Resp: 13 SpO2: 98 % O2 Device: None (Room air)       Hours of care: 2608-9631    D: Admitted on 10/29 for decompensated heart failure and whose hospital course has been prolonged in the setting of decompensated cirrhosis and SBP.      I/A: Canelo (he/him) is A/O x4. Calls appropriately, calm and cooperative. Tele in place, SR, HR 60s. VSS on RA, some MCKENNA. R port a cath in place heparin locked. Sacrum/coccyx wound with mepilex, CDI. No new skin deficits noted. Tolerating regular diet with 1.5L fluid restriction. Up Ax1 with walker. Adequate UOP via urinal, no BM this shift. Appeared to sleep well overnight.     Changed:  Running:   PRN: oxy x1, tylenol x1     P: Continue to monitor and follow POC. Awaiting TCU placement. Notify [facility] 7 with changes.    Goal Outcome Evaluation:    Plan of Care Reviewed With: patient  Overall Patient Progress: no change  Outcome Evaluation: VSS on RA. Pain controlled on current regimen. Awaiting TCU placement.

## 2024-11-13 NOTE — PLAN OF CARE
Goal Outcome Evaluation:      Plan of Care Reviewed With: patient    Overall Patient Progress: improving    Outcome Evaluation: VVS on RA. Pain controlled with current regimen. Verbalized understanding of care plan. Awaiting TCU placement.    /52 (Cuff Size: Adult Regular)   Pulse 72   Temp 97.8  F (36.6  C) (Oral)   Resp 21   Wt 106.5 kg (234 lb 12.8 oz)   SpO2 97%   BMI 33.69 kg/m        Neuro: alert and oriented x4  Cardiac: SR w heart murmur, denied chest pain  Respiratory: RA, SOB on exertion  GI/: continent b/b, uses urinal and bedside commode. Last BM: 11/13  Diet/appetite: Regular diet, tolerated well.1.5L fluid restriction  Activity: Assist x1. Ambulated out of room x1.  Pain: reported 7/10 hip/knee/back pain, PRN oxycodone and tylenol given and effective.  Skin: sacrum/coccyx wound covered with foam dressing   LDA's: R Port single lumen, hep locked      Plan: awaiting TCU placement

## 2024-11-13 NOTE — PROGRESS NOTES
[facility]    Medicine Progress Note - Hospitalist Service, GOLD TEAM 7    Date of Admission:  10/29/2024    Assessment & Plan   Shortness of Breath with Exertion  - high risk for DVT/PE given immobility, age, cancer diagnosis, but he's not tachycardic or hypoxemic and his Beaufort Criteria score is only 3 (8% risk); plt are low so DVT ppx has been on hold, though he is on aspirin  - leading diagnosis at this point is exacerbation of HCM due to over-diuresis; cardiology was reconsulted and is working on up-titrating his metoprolol (dose had been decreased a bit when he was getting actively diuresed on cards service) --> cards to recheck echo today  - VQ scan from 11/11/24 negative  - this has improved significantly and seems to have all but resolved with a higher dose of metoprolol + transfusion    Spontaneous Bacterial Peritonitis  Cirrhosis 2/2 HCV, Newly Decompensated, c/b ascites, SBP  Completed treatment with zosyn, now on ciprofloxacin ppx, see ID note from 11/7     RENNY, suspect prerenal, improved but stalled out Cr around 1.5-1.6  CKD Stage III  Baseline Cr ~0.8-1 recently  - diuretics on hold, will consider adding back maintence 11/12/24  - urine sodium low and urine concentrated 11/5, Cr improving with holding diuretic.      BRBPR 11/7  - likely hemorrhoidal given presence of internal hemorrhoids on rectal exam 11/8; hgb checked soon after was stable and vitals have shown consistent stability    Anemia of chronic disease, + possible small volume intraperitoneal bleed following paracentesis  - last tap was serosanguinous and hgb dropped about a gram overall; transfused 1 unit 11/10  - iron studies don't implicate deficiency; likely degree of splenic sequestration given plt count and liver disease  - continue to monitor for signs/symptoms consistent with GIB  - ok to transfuse for hgb < 7; cardiology recommending higher threshold of 8 g - discussed with patient and  will go ahead and give another unit given persistent light headedness with position change  - GI scoped 11/11/24- small varices but no source of GIB; sticking with hypothesis that he had a small intraperitoneal bleed s/p paracentesis which has resolved    Hypomagnesemia   ISO RENNY as above. RN protocol for replacement.     HCM with EDGAR with Valsalva LVOTO Gradient  Acute-on-Chronic HFpEF  HTN  Longstanding hx of HCM, recently established care w/ [facility] for HF. Has been struggling w/ worsening BLE edema, increased abdominal distension (weeping ascites), from poorly controlled hypervolemia. PTA diuretic regimen w/ Spironolactone was deemed inadequate, and given his difficulties w/ mobility at home and need for frequent appts/labs w/ titration of OP diuresis, decision was made to directly admit him instead for more aggressive titration of diuretic regimen w/ close cardiac monitoring. Started on Bumex drip here by Cards. Repeat ECHO 10/30/24 showing hyperkinetic w/ EF 65-70%, dynamic outflow tract obstruction, peak gradient 119mmHg at rest, grade II moderate diastolic dysfunction - on most recent echo 11/8 this gradient was lower (80) indicative of probable over-estimation on prior. Transferred to Medicine service 10/31 given SBP.              - Spironolactone and Bumex drip stopped 10/31; restart a daily bumex 11/14 with resolution of RENNY               - Hold PTA Valsartan 160mg daily w/ low BP, worsening LVOT gradient   - reduced PTA Metoprolol succinate 150mg daily to 125 mg daily, back up to 150 mg as of 11/8, 200 mg as of 11/9    - Avoid vasodilators               - Daily standing weights (per pt, a 'good' weight for him is 230 lbs)     Metastatic Prostate Cancer  Chronic Pancytopenia   Initially diagnosed earlier this year incidentally following a fall at home, fracturing his L femur. ORIF 05/24/24 and curretage samples c/w adenocarcinoma of prostate origin. Has since followed w/ Oncology as an OP, last saw  "10/24/24. PTA on Daralutamide BID, Docetaxel (C4D1 was on 10/24/24) and Leuprolide A0lpbpox (last 9/5/24). Also gets Neulasta, last 10/25. Has chronic pancytopenia, likely d/t multiple comorbidities (cirrhosis, CKD) and iatrogenic (chemotherapy). Baseline hgb ~8-9 recently, last few days in 7's  - Oncology consulted here 10/31 and recommended to continue Daralutamide, they signed off  - patient tells me his next docetaxel infusion is scheduled for Thursday- I have placed order to ask oncology about getting this inpatient   - Continue PTA Prednisone   - Transfuse for Hgb <7      Type 2 Diabetes Mellitus, non-insulin-dependent, well-controlled  Last A1c 5.8% on 10/29/24. PTA Metformin. BG checks past 24 hours are all within goal (<180).   - Hold PTA Metformin given RENNY as above  - Continue medium sliding scale as started on admission  - BG checks TID AC + at bedtime + 0200  - Hypoglycemia protocol          Diet: Regular Diet Adult  Snacks/Supplements Adult: Other; If pt would like a snack or suppelment, please send; With Meals  Snacks/Supplements Adult: [employer] Bar; Between Meals  Fluid restriction 1500 ML FLUID    DVT Prophylaxis: none due to bleeding risk  Rankin Catheter: Not present  Lines: PRESENT      Port a Cath 08/12/24 Single Lumen Right-Site Assessment: WDL      Cardiac Monitoring: None  Code Status: Full Code      Clinically Significant Risk Factors               # Hypoalbuminemia: Lowest albumin = 2.5 g/dL at 10/30/2024  8:33 AM, will monitor as appropriate   # Thrombocytopenia: Lowest platelets = 52 in last 2 days, will monitor for bleeding   # Hypertension: Noted on problem list            # Obesity: Estimated body mass index is 33.69 kg/m  as calculated from the following:    Height as of 10/24/24: 1.778 m (5' 10\").    Weight as of this encounter: 106.5 kg (234 lb 12.8 oz).        # Financial/Environmental Concerns: none         Disposition Plan     Medically Ready for Discharge: Anticipated in 2-4 " Days      Saumya Torres,   Hospitalist Service, GOLD TEAM 7  [facility]  Securely message with [employer] (more info)  Text page via [employer] Paging/Directory   See signed in provider for up to date coverage information  ______________________________________________________________________    Interval History   No acute overnight events. Canelo feels he can ambulate better without feeling lightheaded. He feels his abdomen is mildly more distended but in general soft.     Physical Exam   Vital Signs: Temp: 97.8  F (36.6  C) Temp src: Oral BP: 118/52 Pulse: 72   Resp: 21 SpO2: 97 % O2 Device: None (Room air)    Weight: 234 lbs 12.8 oz    General Appearance: Appears chronically unwell, but no acute distress, sitting in recliner  Respiratory: normal RR, no acute distress   GI: abdomen is distended but still soft, nontender   Skin: lower extremities have lymphedema wraps in place    Medical Decision Making       35 MINUTES SPENT BY ME on the date of service doing chart review, history, exam, documentation & further activities per the note.      Data     I have personally reviewed the following data over the past 24 hrs:    4.9  \   8.0 (L)   / 54 (L)     139 107 49.6 (H) /  161 (H)   4.0 25 1.34 (H) \     ALT: 32 AST: 51 (H) AP: 182 (H) TBILI: 0.8   ALB: 2.7 (L) TOT PROTEIN: 4.6 (L) LIPASE: N/A       Imaging results reviewed over the past 24 hrs:   No results found for this or any previous visit (from the past 24 hours).

## 2024-11-13 NOTE — PROGRESS NOTES
GASTROENTEROLOGY PROGRESS NOTE    Date: 11/13/2024     ASSESSMENT:  Gucci Logan is a 74 year old male with a history significant for MASLD/HepC s/p IFN/Ribavarin/Boceprevir related compensated cirrhosis, Metastatic Prostate Ca s/p Rad now combination ADT/Docetaxel, HCM c/b HFpEF who was admitted for acute on chronic systolic heart failure and new onset ascites complicated by spontaneous bacterial peritonitis.        #BRBPR  #Reports of melena    Patient reported black, tarry stool and black stools noted in nursing notes; saw a picture- stool looked brown/green w/ small amount of dark red blood on top; HD stable, needed a blood transfusion overnight; lEGD 11/11 showed PHG w/ small varices, no etiology of bleeding found; no further black stools noted and Hgb stable     # New Ascites complicated by SBP   # RENNY (baseline Cr 1)  Patient with new onset ascites; diagnostic fluid studies consistent with portal hypertensive ascites 2/2 cirrhosis and cell ct c.w. SBP. Ceftriaxone 2 g daily started 10/30/24, repeat para also positive 11/2, switched abx to Zosyn; repeat para 11/6 was improved (580->558->452); Cr on admit 1.69, now 1.38     #. Decompensated Cirrhosis   Primary Hepatologist: Dr. Renteria   Etiology: HCV/MASLD   Ascites: + clinically.   - on 50mg spironolactone, 2mg Bumex OP  - SBP history: + hx (dx 10/30/24) , repeat para also positive 11/2, switched abx to Zosyn; repeat para 11/6 was improved (580->558->452); completed 5 days of Zosyn per ID; will need ppx   Hepatic encephalopathy: PTA lactulose and rifaximin  EV: EGD 5/2023 without varices   TIPS: n/a   PV: Patent on US 8/19/24   HCC: CT A/P 10/18/24 without concerning lesions      MELD 3.0: 11 at 8/21/2024  6:34 AM  MELD-Na: 10 at 8/21/2024  6:34 AM  Calculated from:  Serum Creatinine: 1.04 mg/dL at 8/21/2024  6:34 AM  Serum Sodium: 140 mmol/L (Using max of 137 mmol/L) at 8/21/2024  6:34 AM  Total Bilirubin: 1.1 mg/dL at 8/20/2024  6:06 AM  Serum  Albumin: 2.7 g/dL at 8/20/2024  6:06 AM  INR(ratio): 1.32 at 8/19/2024  5:14 PM  Age at listing (hypothetical): 74 years  Sex: Male at 8/21/2024  6:34 AM      RECOMMENDATIONS:  - Okay to resume Bumex from the GI perspective  - Cont cipro ppx    Thank you for involving us in this patient's care. Please do not hesitate to contact the GI service with any questions or concerns.      Pt care plan discussed with Dr. Keating, GI staff physician.    Amie Severino   Gastroenterology Fellow  _______________________________________________________________    Subjective: patient reports 3 loose stools yesterday which were brown; SOB w/ exertion is improved     Objective:  Blood pressure 119/65, pulse 63, temperature 97.8  F (36.6  C), temperature source Oral, resp. rate 14, weight 106.5 kg (234 lb 12.8 oz), SpO2 98%.    General: NAD    Lungs: on RA, comfortable  Abdomen: soft, distended, NT  Extremities: no edema, erythematous rash bl anterior legs  Musculoskeletal: No gross deformity.  Skin: No jaundice   Mental status/Psych: A&O. Asks/answers questions appropriately. Pleasant, interactive     LABS:  [facility]  Recent Labs   Lab 11/13/24  0521 11/12/24  2134 11/12/24  1325 11/12/24  0830 11/12/24  0448 11/11/24  0809 11/11/24  0439 11/10/24  0905 11/10/24  0425     --   --   --  140  --  139  --  140   POTASSIUM 4.0  --   --   --  4.1  --  3.8  --  3.8   CHLORIDE 107  --   --   --  107  --  105  --  106   SOLEDAD 8.0*  --   --   --  8.2*  --  8.2*  --  7.9*   CO2 25  --   --   --  25  --  24  --  26   BUN 49.6*  --   --   --  57.1*  --  60.7*  --  65.9*   CR 1.34*  --   --   --  1.38*  --  1.41*  --  1.54*   * 125* 210* 148* 150*   < > 152*   < > 128*    < > = values in this interval not displayed.     CBC  Recent Labs   Lab 11/13/24  0521 11/12/24  0448 11/11/24  1939 11/11/24  0439 11/10/24  0425   WBC 4.9 5.4  --  5.3 4.6   RBC 2.45* 2.59*  --  2.33* 2.05*   HGB 8.0* 8.5*   < > 7.6* 6.8*   HCT 25.4* 26.5*  --  23.8* 21.4*    * 102*  --  102* 104*   MCH 32.7 32.8  --  32.6 33.2*   MCHC 31.5 32.1  --  31.9 31.8   RDW 21.2* 21.3*  --  21.4* 20.7*   PLT 54* 52*  --  51* 44*    < > = values in this interval not displayed.     INRNo lab results found in last 7 days.  LFTs  Recent Labs   Lab 11/13/24  0521 11/12/24  0448 11/11/24  0439 11/10/24  0425   ALKPHOS 182* 190* 180* 171*   AST 51* 62* 69* 46*   ALT 32 36 38 24   BILITOTAL 0.8 0.8 0.9 0.8   PROTTOTAL 4.6* 4.8* 4.6* 4.6*   ALBUMIN 2.7* 2.8* 2.7* 2.7*      PANCNo lab results found in last 7 days.

## 2024-11-14 ENCOUNTER — TELEPHONE (OUTPATIENT)
Dept: FAMILY MEDICINE | Facility: CLINIC | Age: 74
End: 2024-11-14

## 2024-11-14 ENCOUNTER — APPOINTMENT (OUTPATIENT)
Dept: OCCUPATIONAL THERAPY | Facility: CLINIC | Age: 74
End: 2024-11-14
Attending: INTERNAL MEDICINE
Payer: COMMERCIAL

## 2024-11-14 LAB
ALBUMIN SERPL BCG-MCNC: 2.6 G/DL (ref 3.5–5.2)
ALP SERPL-CCNC: 209 U/L (ref 40–150)
ALT SERPL W P-5'-P-CCNC: 36 U/L (ref 0–70)
ANION GAP SERPL CALCULATED.3IONS-SCNC: 7 MMOL/L (ref 7–15)
AST SERPL W P-5'-P-CCNC: 53 U/L (ref 0–45)
BILIRUB DIRECT SERPL-MCNC: 0.23 MG/DL (ref 0–0.3)
BILIRUB SERPL-MCNC: 0.8 MG/DL
BUN SERPL-MCNC: 45 MG/DL (ref 8–23)
CALCIUM SERPL-MCNC: 8.2 MG/DL (ref 8.8–10.4)
CHLORIDE SERPL-SCNC: 108 MMOL/L (ref 98–107)
CREAT SERPL-MCNC: 1.28 MG/DL (ref 0.67–1.17)
EGFRCR SERPLBLD CKD-EPI 2021: 59 ML/MIN/1.73M2
ERYTHROCYTE [DISTWIDTH] IN BLOOD BY AUTOMATED COUNT: 21.2 % (ref 10–15)
GLUCOSE BLDC GLUCOMTR-MCNC: 123 MG/DL (ref 70–99)
GLUCOSE BLDC GLUCOMTR-MCNC: 132 MG/DL (ref 70–99)
GLUCOSE BLDC GLUCOMTR-MCNC: 164 MG/DL (ref 70–99)
GLUCOSE BLDC GLUCOMTR-MCNC: 179 MG/DL (ref 70–99)
GLUCOSE SERPL-MCNC: 139 MG/DL (ref 70–99)
HCO3 SERPL-SCNC: 24 MMOL/L (ref 22–29)
HCT VFR BLD AUTO: 26.2 % (ref 40–53)
HGB BLD-MCNC: 8.2 G/DL (ref 13.3–17.7)
MAGNESIUM SERPL-MCNC: 2.2 MG/DL (ref 1.7–2.3)
MCH RBC QN AUTO: 32.5 PG (ref 26.5–33)
MCHC RBC AUTO-ENTMCNC: 31.3 G/DL (ref 31.5–36.5)
MCV RBC AUTO: 104 FL (ref 78–100)
PLATELET # BLD AUTO: 56 10E3/UL (ref 150–450)
POTASSIUM SERPL-SCNC: 4.1 MMOL/L (ref 3.4–5.3)
PROT SERPL-MCNC: 4.5 G/DL (ref 6.4–8.3)
PSA SERPL DL<=0.01 NG/ML-MCNC: 2.61 NG/ML (ref 0–6.5)
RBC # BLD AUTO: 2.52 10E6/UL (ref 4.4–5.9)
SODIUM SERPL-SCNC: 139 MMOL/L (ref 135–145)
WBC # BLD AUTO: 4.1 10E3/UL (ref 4–11)

## 2024-11-14 PROCEDURE — 82247 BILIRUBIN TOTAL: CPT | Performed by: NURSE PRACTITIONER

## 2024-11-14 PROCEDURE — 120N000005 HC R&B MS OVERFLOW UMMC

## 2024-11-14 PROCEDURE — 99233 SBSQ HOSP IP/OBS HIGH 50: CPT | Performed by: STUDENT IN AN ORGANIZED HEALTH CARE EDUCATION/TRAINING PROGRAM

## 2024-11-14 PROCEDURE — 250N000012 HC RX MED GY IP 250 OP 636 PS 637: Performed by: NURSE PRACTITIONER

## 2024-11-14 PROCEDURE — 250N000013 HC RX MED GY IP 250 OP 250 PS 637: Performed by: INTERNAL MEDICINE

## 2024-11-14 PROCEDURE — 99233 SBSQ HOSP IP/OBS HIGH 50: CPT | Performed by: INTERNAL MEDICINE

## 2024-11-14 PROCEDURE — 97535 SELF CARE MNGMENT TRAINING: CPT | Mod: GO | Performed by: OCCUPATIONAL THERAPIST

## 2024-11-14 PROCEDURE — 99418 PROLNG IP/OBS E/M EA 15 MIN: CPT | Performed by: STUDENT IN AN ORGANIZED HEALTH CARE EDUCATION/TRAINING PROGRAM

## 2024-11-14 PROCEDURE — 84155 ASSAY OF PROTEIN SERUM: CPT | Performed by: NURSE PRACTITIONER

## 2024-11-14 PROCEDURE — 250N000013 HC RX MED GY IP 250 OP 250 PS 637: Performed by: PEDIATRICS

## 2024-11-14 PROCEDURE — 250N000011 HC RX IP 250 OP 636: Performed by: NURSE PRACTITIONER

## 2024-11-14 PROCEDURE — 85027 COMPLETE CBC AUTOMATED: CPT | Performed by: NURSE PRACTITIONER

## 2024-11-14 PROCEDURE — 250N000013 HC RX MED GY IP 250 OP 250 PS 637: Performed by: STUDENT IN AN ORGANIZED HEALTH CARE EDUCATION/TRAINING PROGRAM

## 2024-11-14 PROCEDURE — 82565 ASSAY OF CREATININE: CPT | Performed by: STUDENT IN AN ORGANIZED HEALTH CARE EDUCATION/TRAINING PROGRAM

## 2024-11-14 PROCEDURE — 97535 SELF CARE MNGMENT TRAINING: CPT | Mod: GO

## 2024-11-14 PROCEDURE — 250N000013 HC RX MED GY IP 250 OP 250 PS 637: Performed by: NURSE PRACTITIONER

## 2024-11-14 PROCEDURE — 83735 ASSAY OF MAGNESIUM: CPT | Performed by: STUDENT IN AN ORGANIZED HEALTH CARE EDUCATION/TRAINING PROGRAM

## 2024-11-14 PROCEDURE — 84153 ASSAY OF PSA TOTAL: CPT

## 2024-11-14 RX ORDER — FAMOTIDINE 20 MG/1
20 TABLET, FILM COATED ORAL 2 TIMES DAILY PRN
Status: DISCONTINUED | OUTPATIENT
Start: 2024-11-14 | End: 2024-11-16 | Stop reason: HOSPADM

## 2024-11-14 RX ADMIN — OXYCODONE HYDROCHLORIDE 10 MG: 5 TABLET ORAL at 20:21

## 2024-11-14 RX ADMIN — PREDNISONE 5 MG: 5 TABLET ORAL at 08:02

## 2024-11-14 RX ADMIN — METOPROLOL SUCCINATE 200 MG: 50 TABLET, EXTENDED RELEASE ORAL at 08:02

## 2024-11-14 RX ADMIN — PANTOPRAZOLE SODIUM 40 MG: 40 TABLET, DELAYED RELEASE ORAL at 08:02

## 2024-11-14 RX ADMIN — Medication 950 MG: at 11:45

## 2024-11-14 RX ADMIN — BUMETANIDE 2 MG: 2 TABLET ORAL at 08:02

## 2024-11-14 RX ADMIN — CIPROFLOXACIN 500 MG: 500 TABLET ORAL at 08:46

## 2024-11-14 RX ADMIN — ASPIRIN 81 MG CHEWABLE TABLET 81 MG: 81 TABLET CHEWABLE at 08:02

## 2024-11-14 RX ADMIN — HEPARIN, PORCINE (PF) 10 UNIT/ML INTRAVENOUS SYRINGE 5 ML: at 04:51

## 2024-11-14 RX ADMIN — ACETAMINOPHEN 650 MG: 325 TABLET, FILM COATED ORAL at 20:21

## 2024-11-14 NOTE — CONSULTS
Hematology and Oncology Progress Note    Today's Date:11/14/2024  Admission Date:10/29/2024    S: Gucci Logan is a 74 year old male with metastatic prostate cancer on ADT and darolutamide s/p 4 cycles of Docetaxel admitted with HCM and SBP s/p antibiotics currently awaiting TCU placement. Oncology was consulted regarding outpatient chemotherapy appt 11/14.    Patient asking when he can go to [facility] [place].  Upset that this can't happened.  Reports not being told about his PET/CT on 10/18 and wanting to know if his treatment is working. He expressed frustration about being the in hospital and concerns about miscommunication.    Oncology Treatment History  degarelix 240 mg loading dose on 07/29/2024, then leuprolide 22.5 mg every 3 months beginning 08/26/2024, and docetaxel 60 mg/m  IV every 21 days x6 cycles and darolutamide 600 mg BID beginning 08/14/2024.  There was a docetaxel 60 mg/m  dose reduction due to underlying cirrhosis prior history of hepatitis C.  Patient received 4 cycles of darolutamide/docetaxel last on 10/24/24.     ROS: All other systems were reviewed and are negative except for what is discussed in the subjective above.    ALLERGIES:   Allergies   Allergen Reactions    Bee Venom Swelling     Gum swelling    Haemophilus B Polysaccharide Vaccine Other (See Comments)     PN: delirium  fever    Haemophilus Influenzae Nausea and Other (See Comments)     PN: delirium  fever    Other Reaction(s): Fever    PN: delirium  fever   PN: delirium  fever    Pneumococcal Vaccine      Other Reaction(s): *Unknown, adverse reaction       Inpatient Medications:    Current Facility-Administered Medications:     acetaminophen (TYLENOL) Suppository 650 mg, 650 mg, Rectal, Q4H PRN, Lexi Crespo, APRN CNP    acetaminophen (TYLENOL) tablet 650 mg, 650 mg, Oral, Q4H PRN, Lexi Crespo APRN CNP, 650 mg at 11/13/24 2134    alteplase (CATHFLO ACTIVASE) injection 1-2 mg, 1-2 mg,  Intravenous, Q2H PRN, Cruz Blackwell MD, 2 mg at 11/08/24 0544    alum & mag hydroxide-simethicone (MAALOX) suspension 30 mL, 30 mL, Oral, Q4H PRN, Lexi Crespo APRN CNP    aspirin EC tablet 81 mg, 81 mg, Oral, Daily, Lexi Crespo APRN CNP, 81 mg at 11/14/24 0802    benzocaine 20% (HURRICAINE/TOPEX) 20 % spray, , Mouth/Throat, PRN, Home Mor[place]aya MD, 1 spray at 11/11/24 1514    bumetanide (BUMEX) tablet 2 mg, 2 mg, Oral, Daily, Saumya Torres DO, 2 mg at 11/14/24 0802    calcium carbonate (TUMS) chewable tablet 500 mg, 500 mg, Oral, Daily PRN, Louis Hanson PA-C, 500 mg at 11/05/24 2215    calcium citrate (CITRACAL) tablet 950 mg, 950 mg, Oral, Daily, Lexi Crespo APRN CNP, 950 mg at 11/14/24 1145    ciprofloxacin (CIPRO) tablet 500 mg, 500 mg, Oral, Q24H VINCENT, Fracisco Wilkins DO, 500 mg at 11/14/24 0846    darolutamide (NUBEQA) tablet 600 mg, 600 mg, Oral, BID, Lexi Crespo APRN CNP, 600 mg at 11/14/24 0846    glucose gel 15-30 g, 15-30 g, Oral, Q15 Min PRN **OR** dextrose 50 % injection 25-50 mL, 25-50 mL, Intravenous, Q15 Min PRN **OR** glucagon injection 1 mg, 1 mg, Subcutaneous, Q15 Min PRN, Lexi Crespo APRN CNP    diclofenac (VOLTAREN) 1 % topical gel 4 g, 4 g, Topical, TID PRN, Lexi Crespo APRN CNP    famotidine (PEPCID) tablet 10 mg, 10 mg, Oral, BID PRN, Jordan Grimes DO, 10 mg at 11/04/24 1612    heparin lock flush 10 unit/mL injection 5-10 mL, 5-10 mL, Intracatheter, Q1H PRN, Lexi Crespo APRN CNP, 5 mL at 11/11/24 2002    heparin lock flush 10 unit/mL injection 5-10 mL, 5-10 mL, Intracatheter, Q24H, Lexi Crsepo APRN CNP, 5 mL at 11/14/24 0451    heparin lock flush 100 unit/mL injection 5-10 mL, 5-10 mL, Intracatheter, Q28 Days, Lexi Crespo APRN CNP    insulin aspart (NovoLOG) injection (RAPID ACTING), 1-7 Units, Subcutaneous, TID AC, Lexi Crespo APRN CNP, 1 Units at 11/13/24 2013     insulin aspart (NovoLOG) injection (RAPID ACTING), 1-5 Units, Subcutaneous, At Bedtime, Lexi Crespo APRN CNP, 1 Units at 11/13/24 2143    lidocaine (LMX4) cream, , Topical, Q1H PRN, Lexi Crespo APRN CNP    lidocaine 1 % 0.1-1 mL, 0.1-1 mL, Other, Q1H PRN, Lexi Crespo APRN CNP    medication instruction, , Does not apply, Continuous PRN, Lexi Crespo APRN CNP    methocarbamol (ROBAXIN) tablet 500 mg, 500 mg, Oral, Q6H PRN, Lexi Crespo APRN CNP, 500 mg at 11/02/24 1832    metoprolol succinate ER (TOPROL XL) 24 hr tablet 200 mg, 200 mg, Oral, Daily, BiggerFracisco garcía, DO, 200 mg at 11/14/24 0802    naloxone (NARCAN) injection 0.2 mg, 0.2 mg, Intravenous, Q2 Min PRN **OR** naloxone (NARCAN) injection 0.4 mg, 0.4 mg, Intravenous, Q2 Min PRN **OR** naloxone (NARCAN) injection 0.2 mg, 0.2 mg, Intramuscular, Q2 Min PRN **OR** naloxone (NARCAN) injection 0.4 mg, 0.4 mg, Intramuscular, Q2 Min PRN, Tigist Yepez MD    nitroGLYcerin (NITROSTAT) sublingual tablet 0.4 mg, 0.4 mg, Sublingual, Q5 Min PRN, Lexi Crespo APRN CNP    oxyCODONE (ROXICODONE) tablet 5-10 mg, 5-10 mg, Oral, Q6H PRN, Lexi Crepso APRN CNP, 5 mg at 11/13/24 2134    pantoprazole (PROTONIX) EC tablet 40 mg, 40 mg, Oral, QAM AC, Tigist Yepez MD, 40 mg at 11/14/24 0802    polyethylene glycol (MIRALAX) Packet 17 g, 17 g, Oral, BID, Jordan Grimes, DO, 17 g at 11/10/24 0826    predniSONE (DELTASONE) tablet 5 mg, 5 mg, Oral, Daily, Lexi Crespo APRN CNP, 5 mg at 11/14/24 0802    prochlorperazine (COMPAZINE) tablet 5 mg, 5 mg, Oral, Q6H PRN, Lexi Crespo APRN CNP    senna-docusate (SENOKOT-S/PERICOLACE) 8.6-50 MG per tablet 1 tablet, 1 tablet, Oral, BID PRN **OR** senna-docusate (SENOKOT-S/PERICOLACE) 8.6-50 MG per tablet 2 tablet, 2 tablet, Oral, BID PRN, Lexi Crespo APRN CNP    sodium chloride (PF) 0.9% PF flush 10-20 mL, 10-20 mL, Intracatheter, q1 min prn, Cherie,  Lexi MONTE, APRN CNP, 10 mL at 11/03/24 1714    sodium chloride (PF) 0.9% PF flush 10-20 mL, 10-20 mL, Intracatheter, Q1H PRN, Lexi Crespo APRN CNP, 10 mL at 11/03/24 0428    sodium chloride (PF) 0.9% PF flush 10-20 mL, 10-20 mL, Intracatheter, Q28 Days, Lexi Crespo APRN CNP, 20 mL at 10/29/24 1714    sodium chloride (PF) 0.9% PF flush 3 mL, 3 mL, Intracatheter, Q8H, Lexi Crespo APRN CNP, 3 mL at 11/13/24 0905    sodium chloride (PF) 0.9% PF flush 3 mL, 3 mL, Intracatheter, q1 min prn, Lxei Crespo APRN CNP    [Held by provider] spironolactone (ALDACTONE) tablet 50 mg, 50 mg, Oral, Daily, Lxei Crespo APRN CNP, 50 mg at 10/31/24 0913    [Held by provider] valsartan (DIOVAN) tablet 160 mg, 160 mg, Oral, Daily, Lexi Crespo APRN CNP, 160 mg at 10/30/24 0816    Physical Exam  Temp:  [97.6  F (36.4  C)-98.5  F (36.9  C)] 98.5  F (36.9  C)  Pulse:  [62-83] 72  Resp:  [13-25] 13  BP: (114-123)/(53-60) 117/55  SpO2:  [96 %-99 %] 98 %  Gen: NAD, pleasant, interactive and answering questions appropriately  HEENT: Normocephalic atraumatic, sclera anicteric  Neck: Full range of motion  Lungs: No respiratory distress, speaking in full sentences, no coughing  Skin: no facial rash noted  Exam not otherwise performed    Lab:  Recent Labs   Lab 11/14/24  0535 11/13/24  0521 11/12/24  0448   WBC 4.1 4.9 5.4   HGB 8.2* 8.0* 8.5*   HCT 26.2* 25.4* 26.5*   PLT 56* 54* 52*     Recent Labs   Lab 11/14/24  0535 11/13/24  0521 11/12/24  0448    139 140   CO2 24 25 25   BUN 45.0* 49.6* 57.1*   ALKPHOS 209* 182* 190*   ALT 36 32 36   AST 53* 51* 62*       Assessment/Plan:  Gucci Logan is a 74 year old male with metastatic prostate cancer on ADT and darolutamide s/p 4 cycles of Docetaxel admitted with HCM and SBP s/p antibiotics currently awaiting TCU placement. Oncology was consulted regarding outpatient chemotherapy appt 11/14.    1. Prostate cancer: With his current  hospitalization, I explained that he could not receive chemotherapy in the infusion center today.  He was displeased with this information.  I explained this could be rescheduled quickly as an outpatient.  I did order a PSA today which is pending.  He then reported that no one had reviewed his PSMA scan with him (from 10/18). I then pulled up the report and explained that there were some indeterminate findings that may be flare vs progression and then explained that his PSA had been downtrending (and was pending from today) and I felt this was important for him to discuss with his outpatient oncologist before moving forward with systemic therapy.  The patient then became upset that information from his cancer was being withheld from him. I assured him I was giving him the available information.  I offered to get him the patient relations number.  He discussed leaving AMA and I explained I would reach out to his hospitalist to discuss as I believed the discharge recommendation is for rehab/TCU.  I also said I would reach out to his primary oncologist but ultimately I felt the discussion about the status of his cancer and future treatment plans would be best discussed in the outpatient setting with his own oncologist along with an updated PSA.    I then discussed with Dr. Plunkett who confirmed that he did review the PSMA scan with the patient in clinic on 10/24/24. He recommended stopping docetaxel (after the 4 cycles given) with continued treatment with darolutamide and ADT.  He is available to talk to the patient Thursday or Friday.  If patient ends up leaving hospital before that, an outpatient appt will be arranged.  I updated Dr. Torres.  I am happy to discuss more with the patient but feel he would be likely be better served by a discussion with his primary oncologist (as we are able to arrange that).

## 2024-11-14 NOTE — PLAN OF CARE
NURSING PROGRESS NOTE  Shift Summary      Date: November 14, 2024     Neuro/Musculoskeletal:  A&Ox4.   Cardiac:  SR.  VSS.   BLLE edema tubigrips on tolerated throughout this shift.   Respiratory:  Sating in the 90s on RA.  GI/:  Adequate urine output.  LBM: 11/13/24  Diet/Appetite:  Tolerating regular diet. 1.5L fluid restriction  Activity:  Assist of 1   Pain: Reports pain to knee, ankle managed by PRN Oxycodone and tylenol   Skin:  No new deficits noted. Dressing to sacral area.   LDAs + Drips/IVF:  Single lumen Port cath to right hep locked . Dressing CDI  Protocols/Labs:  K+, Mag    Pertinent Shift Updates:  Uneventful night. Pain well managed by PRN oxycodone and Tylenol.       Plan: Chemotherapy. Resume Bumex. Plan of care ongoing.      Abbi Gaffney RN  .................................................... November 14, 2024   6:14 AM  [facility] ([facility]): [facility] ICU (Unit 6D)                  Goal Outcome Evaluation:                Problem: Adult Inpatient Plan of Care  Goal: Absence of Hospital-Acquired Illness or Injury  Intervention: Prevent and Manage VTE (Venous Thromboembolism) Risk  Recent Flowsheet Documentation  Taken 11/14/2024 0000 by Abbi Gaffney RN  VTE Prevention/Management: (Tubigrips on) compression stockings on  Taken 11/13/2024 2000 by Abbi Gaffney RN  VTE Prevention/Management: (Tubigrips on) compression stockings on     Problem: Adult Inpatient Plan of Care  Goal: Absence of Hospital-Acquired Illness or Injury  Intervention: Prevent Infection  Recent Flowsheet Documentation  Taken 11/13/2024 2000 by Abbi Gaffney RN  Infection Prevention:   hand hygiene promoted   personal protective equipment utilized   rest/sleep promoted     Problem: Adult Inpatient Plan of Care  Goal: Optimal Comfort and Wellbeing  Intervention: Provide Person-Centered Care  Recent Flowsheet Documentation  Taken 11/14/2024 0000 by Gulshan  Abbi MCCRACKEN RN  Trust Relationship/Rapport:   care explained   choices provided   emotional support provided   empathic listening provided   questions answered   questions encouraged   thoughts/feelings acknowledged  Taken 11/13/2024 2000 by Abbi Gaffney RN  Trust Relationship/Rapport:   care explained   choices provided   emotional support provided   empathic listening provided   questions answered   questions encouraged   thoughts/feelings acknowledged     Problem: Adult Inpatient Plan of Care  Goal: Absence of Hospital-Acquired Illness or Injury  Intervention: Prevent Infection  Recent Flowsheet Documentation  Taken 11/13/2024 2000 by Abbi Gaffney RN  Infection Prevention:   hand hygiene promoted   personal protective equipment utilized   rest/sleep promoted

## 2024-11-14 NOTE — PLAN OF CARE
Goal Outcome Evaluation:    Vital signs:  Temp: 98.5  F (36.9  C) Temp src: Oral BP: 117/55 Pulse: 72   Resp: 13 SpO2: 98 % O2 Device: None (Room air)          Neuro: alert and oriented x4  Cardiac: SR w heart murmur, denied chest pain  Respiratory: RA, SOB on exertion  GI/: continent b/b, uses urinal and bedside commode. Last BM: 11/14  Diet/appetite: Regular diet, tolerated well.1.5L fluid restriction  Activity: Assist x1. Declined ambulation out of room.  Pain: reported 5/10 hip/knee/back pain, pt declined PRNs and other interventions at this time.  Skin: sacrum/coccyx wound covered with foam dressing   LDA's: R Port single lumen, saline locked.   Other: Pt expressed strong frustration with current situation. Provided emotional support.    Plan: awaiting TCU placement.

## 2024-11-14 NOTE — PROGRESS NOTES
[facility] Nurse Inpatient Assessment     Consulted for: Wounds posterior thigh and buttocks      Patient History (according to provider note(s):      Gucci Logan is a 74 year old male with a history of HCM, metastatic prostate cancer, cirrhosis d/t chronic HCV infection who presents for heart failure exacerbation     Assessment:      Areas visualized during today's visit: Focused:, Perineal area, Sacrum/coccyx, Lower extremities , Right, and Left    Wound location: Fleshy buttocks               Last photo: 11/15  Wound due to: Friction and edema  Wound history/plan of care: Pt has been seen in clinic for weeping sores on his buttocks and upper thigh ever since he fell and fractured his left hip earlier this year. Pt was subsequently diagnosed with metastatic prostate cancer after he received imaging for his hip fracture. Pt also suffers from heart failure resulting in significant edema in the lower extremities and buttocks. On assessment three open wounds noted to Pt's buttocks. The wounds are not over a bony prominence. Each has an irregular border. The larger sore on the left buttock appears to have been a blister that unroofed as there is some loose epidermis at the edges of the wound base. The left thigh has all in tact epidermis with blanchable erythema. Palpable edema present in all areas.  11/7: There is a newly formed raised nodule on the left buttock. On assessment it has the appearance of a macerated mole. Approximately 0.5 x 0.5 cm. MD made aware of findings.  11/15: Wounds are healing well. There is a new area of scattered purpura on the right side of Pt's buttock consistent with skin findings located elsewhere on Pt's body, most likely bruising.   Wound base: 100 % Fibrin     Palpation of the wound bed: normal      Drainage: small     Description of drainage: serous     Measurements (length x width x depth, in cm): See photos. Wounds mostly  "healed with new pink epithelium.     Tunneling: N/A     Undermining: N/A  Periwound skin: Intact and Edematous      Color: normal and consistent with surrounding tissue and pink      Temperature: normal   Odor: none  Pain: denies , none  Pain interventions prior to dressing change: slow and gentle cares   Treatment goal: Heal  and Protection  STATUS: healing  Supplies ordered: at bedside and discussed with patient       Treatment Plan:     Buttock wound(s): Every 3 days   Cleanse the area with NS and pat dry.  Apply No sting film barrier to periwound skin.  Cover wound with Sacral Mepilex (#[id number])  Change dressing Q 3 days.  Turn and reposition Q 2hrs side to side only.  Ensure pt has Sean-cushion while sitting up in the chair.  FYI- If pt has constant incontinent loose stools needing dressing changes Q shift please discontinue the Mepilex dressing and apply criticaid barrier paste BID and PRN.      Pressure Injury Prevention (PIP) Plan:  If patient is declining pressure injury prevention interventions: Explore reason why and address patient's concerns, Educate on pressure injury risk and prevention intervention(s), If patient is still declining, document \"informed refusal\" , and Ensure Care team is aware ( provider, charge nurse, etc)  Mattress: Follow bed algorithm, add Low Air Loss (Air+) mattress pump if skin is very moist or constantly moist.   HOB: Maintain at or below 30 degrees, unless contraindicated  Repositioning in bed: Every 1-2 hours , Left/right positioning; avoid supine, Raise foot of bed prior to raising head of bed, to reduce patient sliding down (shear), and Frequent microturns using positioning wedges, as patient tolerates  Heels: Pillows under calves  Protective Dressing: Sacral Mepilex for prevention (#[id number]),  especially for the agitated patient   Positioning Equipment:None  Chair positioning: Chair cushion (#[id number]) , Assist patient to reposition hourly, and Do NOT use a donut for sitting " (this increases pressure to smaller area and creates a higher potential for injury)    If patient has a buttock pressure injury, or high risk for PI use chair cushion or SPS.  Moisture Management: Perineal cleansing /protection: Follow Incontinence Protocol, Avoid brief in bed, Clean and dry skin folds with bathing , Consider InterDry (#788963) between folds, and Moisturize dry skin  Under Devices: Inspect skin under all medical devices during skin inspection , Ensure tubes are stabilized without tension, and Ensure patient is not lying on medical devices or equipment when repositioned  Ask provider to discontinue device when no longer needed.     Orders: Reviewed    RECOMMEND PRIMARY TEAM ORDER: None, at this time  Education provided: importance of repositioning, plan of care, wound progress, Moisture management, and Off-loading pressure  Discussed plan of care with: Patient  WOC nurse follow-up plan: weekly  Notify WOC if wound(s) deteriorate.  Nursing to notify the Provider(s) and re-consult the WOC Nurse if new skin concern.    DATA:     Current support surface: Standard  Standard gel mattress (Isoflex) - ordered iosoflex pump for Pt's bed.  Containment of urine/stool: Incontinent pad in bed  BMI: Body mass index is 33.69 kg/m .   Active diet order: Orders Placed This Encounter      Regular Diet Adult     Output: I/O last 3 completed shifts:  In: 1095 [P.O.:1080; I.V.:15]  Out: 900 [Urine:900]     Labs:   Recent Labs   Lab 11/14/24  0535   ALBUMIN 2.6*   HGB 8.2*   WBC 4.1     Pressure injury risk assessment:   Sensory Perception: 3-->slightly limited  Moisture: 4-->rarely moist  Activity: 3-->walks occasionally  Mobility: 3-->slightly limited  Nutrition: 3-->adequate  Friction and Shear: 3-->no apparent problem  Mich Score: 19    Scooter Santos, RN, COCN, CCCN  WO RN Treatment Specialist  Pager no longer in use, please contact through [employer]: [facility] Nurse [place]    Dept. Office Number:  [phone]

## 2024-11-14 NOTE — TELEPHONE ENCOUNTER
"Pt calling from [facility]    He is feeling very frustrated regarding his Cancer treatment and he feels that he was told the treatments would help but they are not and he is feeling \"hoodwinked\" and is wasting the inessa time he has left to spend with his family.    He would like the support of his PCP as he knows the pt very well. He is wanting to just check himself out of the hospital to live the remaining life he has left.     Can I schedule this patient a telephone visit to maineut speak about his concerns?      Heidi, RN    Triage Nurse  [facility]      "

## 2024-11-14 NOTE — PROGRESS NOTES
Care Management Follow Up    Length of Stay (days): 16    Expected Discharge Date: 11/15/2024     Concerns to be Addressed: discharge planning     Patient plan of care discussed at interdisciplinary rounds: Yes    Anticipated Discharge Disposition: Transitional Care / TCU     Anticipated Discharge Services: Therapy/rehab  Anticipated Discharge DME: None    Education Provided on the Discharge Plan:  yes  Patient/Family in Agreement with the Plan: yes    Referrals Placed by CM/SW:  yes  Private pay costs discussed: Not applicable    Additional Information:    , covering for unit 6C attended rounds, and completed chart review. MITUL sent teams message to Carole ( TCU liaision) re: status of referral and beds. [facility]. Per Hospitalist, pt is med ready but got upset with the Oncology team, feeling like they aren't giving him information on his cancer and prognosis.     Referrals Placed by CM/SW:      DECLINED  - 11/4: [facility] (cost of cancer treatment)  - 11/5: [facility] (can't meet pt's needs)  - 11/6: [facility] (no beds)  - 11/7: [facility] (no beds)  - 11/7: [facility] (high cost of chemo meds)  - 11/11: [facility] (only take neuro patients)     PENDING  [facility]  [address] [place] [address], 4th floor  Phone: [phone]  Nurse to Nurse: [phone]  11/13: Per Carole in [facility] admissions to , pts at  TCU would go to IR at [facility] and wouldn't need OP appts for paracentesis set up at [facility]. So no need for  to set up appts.   11/14: MITUL updated  TCU (Carole) that pt will not be getting infusion, and that pt is med ready. Carole stated that there are no beds today at TCU.     Next Steps: Per Hospitalist, pt is med ready but agitated and threatening to medical team to leave AMA. Hospitalist plans to follow up with PT to see if pt would be safe to discharge home. PT recs TCU from  11/10 eval. SW also provided number to Patient Relations to give to pt if requesting.    Mohamud Brown, IVANIA, LGSW  Coverage   [facility]  [email or web address]

## 2024-11-15 ENCOUNTER — VIRTUAL VISIT (OUTPATIENT)
Dept: FAMILY MEDICINE | Facility: CLINIC | Age: 74
End: 2024-11-15
Payer: COMMERCIAL

## 2024-11-15 DIAGNOSIS — R53.1 WEAKNESS: ICD-10-CM

## 2024-11-15 DIAGNOSIS — C79.51 PROSTATE CANCER METASTATIC TO BONE (H): ICD-10-CM

## 2024-11-15 DIAGNOSIS — C61 PROSTATE CANCER METASTATIC TO BONE (H): ICD-10-CM

## 2024-11-15 DIAGNOSIS — F43.9 STRESS: Primary | ICD-10-CM

## 2024-11-15 LAB
ABSOLUTE NEUTROPHILS, BODY FLUID: 69.7 /UL
ALBUMIN BODY FLUID SOURCE: NORMAL
ALBUMIN FLD-MCNC: 0.7 G/DL
ALBUMIN SERPL BCG-MCNC: 2.7 G/DL (ref 3.5–5.2)
ALP SERPL-CCNC: 248 U/L (ref 40–150)
ALT SERPL W P-5'-P-CCNC: 44 U/L (ref 0–70)
ANION GAP SERPL CALCULATED.3IONS-SCNC: 9 MMOL/L (ref 7–15)
APPEARANCE FLD: ABNORMAL
AST SERPL W P-5'-P-CCNC: 69 U/L (ref 0–45)
BASOPHILS # BLD AUTO: 0 10E3/UL (ref 0–0.2)
BASOPHILS NFR BLD AUTO: 0 %
BILIRUB DIRECT SERPL-MCNC: <0.2 MG/DL (ref 0–0.3)
BILIRUB SERPL-MCNC: 0.5 MG/DL
BUN SERPL-MCNC: 45.9 MG/DL (ref 8–23)
CALCIUM SERPL-MCNC: 7.9 MG/DL (ref 8.8–10.4)
CELL COUNT BODY FLUID SOURCE: ABNORMAL
CHLORIDE SERPL-SCNC: 110 MMOL/L (ref 98–107)
COLOR FLD: ABNORMAL
CREAT SERPL-MCNC: 1.62 MG/DL (ref 0.67–1.17)
EGFRCR SERPLBLD CKD-EPI 2021: 45 ML/MIN/1.73M2
EOSINOPHIL # BLD AUTO: 0 10E3/UL (ref 0–0.7)
EOSINOPHIL NFR BLD AUTO: 0 %
ERYTHROCYTE [DISTWIDTH] IN BLOOD BY AUTOMATED COUNT: 21.2 % (ref 10–15)
ERYTHROCYTE [DISTWIDTH] IN BLOOD BY AUTOMATED COUNT: 21.3 % (ref 10–15)
GLUCOSE BLDC GLUCOMTR-MCNC: 116 MG/DL (ref 70–99)
GLUCOSE BLDC GLUCOMTR-MCNC: 130 MG/DL (ref 70–99)
GLUCOSE BLDC GLUCOMTR-MCNC: 140 MG/DL (ref 70–99)
GLUCOSE BLDC GLUCOMTR-MCNC: 141 MG/DL (ref 70–99)
GLUCOSE BLDC GLUCOMTR-MCNC: 161 MG/DL (ref 70–99)
GLUCOSE SERPL-MCNC: 172 MG/DL (ref 70–99)
HCO3 SERPL-SCNC: 24 MMOL/L (ref 22–29)
HCT VFR BLD AUTO: 26.2 % (ref 40–53)
HCT VFR BLD AUTO: 26.7 % (ref 40–53)
HGB BLD-MCNC: 8.3 G/DL (ref 13.3–17.7)
HGB BLD-MCNC: 8.4 G/DL (ref 13.3–17.7)
IMM GRANULOCYTES # BLD: 0 10E3/UL
IMM GRANULOCYTES NFR BLD: 0 %
LYMPHOCYTES # BLD AUTO: 0.5 10E3/UL (ref 0.8–5.3)
LYMPHOCYTES NFR BLD AUTO: 13 %
LYMPHOCYTES NFR FLD MANUAL: 13 %
MAGNESIUM SERPL-MCNC: 2.4 MG/DL (ref 1.7–2.3)
MCH RBC QN AUTO: 33.1 PG (ref 26.5–33)
MCH RBC QN AUTO: 33.3 PG (ref 26.5–33)
MCHC RBC AUTO-ENTMCNC: 31.5 G/DL (ref 31.5–36.5)
MCHC RBC AUTO-ENTMCNC: 31.7 G/DL (ref 31.5–36.5)
MCV RBC AUTO: 105 FL (ref 78–100)
MCV RBC AUTO: 105 FL (ref 78–100)
MONOCYTES # BLD AUTO: 0.3 10E3/UL (ref 0–1.3)
MONOCYTES NFR BLD AUTO: 8 %
MONOS+MACROS NFR FLD MANUAL: 0 %
NEUTROPHILS # BLD AUTO: 2.9 10E3/UL (ref 1.6–8.3)
NEUTROPHILS NFR BLD AUTO: 79 %
NEUTS BAND NFR FLD MANUAL: 24 %
NRBC # BLD AUTO: 0 10E3/UL
NRBC BLD AUTO-RTO: 0 /100
OTHER CELLS FLD MANUAL: 63 %
PLATELET # BLD AUTO: 51 10E3/UL (ref 150–450)
PLATELET # BLD AUTO: 55 10E3/UL (ref 150–450)
POTASSIUM SERPL-SCNC: 4.2 MMOL/L (ref 3.4–5.3)
PROT FLD-MCNC: 0.9 G/DL
PROT SERPL-MCNC: 4.8 G/DL (ref 6.4–8.3)
PROTEIN BODY FLUID SOURCE: NORMAL
RBC # BLD AUTO: 2.49 10E6/UL (ref 4.4–5.9)
RBC # BLD AUTO: 2.54 10E6/UL (ref 4.4–5.9)
SODIUM SERPL-SCNC: 143 MMOL/L (ref 135–145)
WBC # BLD AUTO: 3.7 10E3/UL (ref 4–11)
WBC # BLD AUTO: 4.6 10E3/UL (ref 4–11)
WBC # FLD AUTO: 295 /UL

## 2024-11-15 PROCEDURE — 250N000013 HC RX MED GY IP 250 OP 250 PS 637: Performed by: NURSE PRACTITIONER

## 2024-11-15 PROCEDURE — 250N000012 HC RX MED GY IP 250 OP 636 PS 637: Performed by: NURSE PRACTITIONER

## 2024-11-15 PROCEDURE — 0W9G3ZZ DRAINAGE OF PERITONEAL CAVITY, PERCUTANEOUS APPROACH: ICD-10-PCS | Performed by: STUDENT IN AN ORGANIZED HEALTH CARE EDUCATION/TRAINING PROGRAM

## 2024-11-15 PROCEDURE — 49083 ABD PARACENTESIS W/IMAGING: CPT | Performed by: STUDENT IN AN ORGANIZED HEALTH CARE EDUCATION/TRAINING PROGRAM

## 2024-11-15 PROCEDURE — 84075 ASSAY ALKALINE PHOSPHATASE: CPT | Performed by: NURSE PRACTITIONER

## 2024-11-15 PROCEDURE — 83735 ASSAY OF MAGNESIUM: CPT | Performed by: STUDENT IN AN ORGANIZED HEALTH CARE EDUCATION/TRAINING PROGRAM

## 2024-11-15 PROCEDURE — 250N000011 HC RX IP 250 OP 636: Performed by: NURSE PRACTITIONER

## 2024-11-15 PROCEDURE — G0463 HOSPITAL OUTPT CLINIC VISIT: HCPCS

## 2024-11-15 PROCEDURE — 99442 PR PHYSICIAN TELEPHONE EVALUATION 11-20 MIN: CPT | Mod: 93 | Performed by: FAMILY MEDICINE

## 2024-11-15 PROCEDURE — 87205 SMEAR GRAM STAIN: CPT | Performed by: STUDENT IN AN ORGANIZED HEALTH CARE EDUCATION/TRAINING PROGRAM

## 2024-11-15 PROCEDURE — 89051 BODY FLUID CELL COUNT: CPT | Performed by: STUDENT IN AN ORGANIZED HEALTH CARE EDUCATION/TRAINING PROGRAM

## 2024-11-15 PROCEDURE — 250N000013 HC RX MED GY IP 250 OP 250 PS 637: Performed by: PEDIATRICS

## 2024-11-15 PROCEDURE — 82042 OTHER SOURCE ALBUMIN QUAN EA: CPT | Performed by: STUDENT IN AN ORGANIZED HEALTH CARE EDUCATION/TRAINING PROGRAM

## 2024-11-15 PROCEDURE — 84403 ASSAY OF TOTAL TESTOSTERONE: CPT

## 2024-11-15 PROCEDURE — 82374 ASSAY BLOOD CARBON DIOXIDE: CPT | Performed by: STUDENT IN AN ORGANIZED HEALTH CARE EDUCATION/TRAINING PROGRAM

## 2024-11-15 PROCEDURE — 250N000013 HC RX MED GY IP 250 OP 250 PS 637: Performed by: INTERNAL MEDICINE

## 2024-11-15 PROCEDURE — 120N000005 HC R&B MS OVERFLOW UMMC

## 2024-11-15 PROCEDURE — 85048 AUTOMATED LEUKOCYTE COUNT: CPT | Performed by: NURSE PRACTITIONER

## 2024-11-15 PROCEDURE — 85025 COMPLETE CBC W/AUTO DIFF WBC: CPT | Performed by: NURSE PRACTITIONER

## 2024-11-15 PROCEDURE — 85014 HEMATOCRIT: CPT | Performed by: NURSE PRACTITIONER

## 2024-11-15 PROCEDURE — 99232 SBSQ HOSP IP/OBS MODERATE 35: CPT | Performed by: STUDENT IN AN ORGANIZED HEALTH CARE EDUCATION/TRAINING PROGRAM

## 2024-11-15 PROCEDURE — 82248 BILIRUBIN DIRECT: CPT | Performed by: NURSE PRACTITIONER

## 2024-11-15 PROCEDURE — 89050 BODY FLUID CELL COUNT: CPT | Performed by: STUDENT IN AN ORGANIZED HEALTH CARE EDUCATION/TRAINING PROGRAM

## 2024-11-15 PROCEDURE — 85027 COMPLETE CBC AUTOMATED: CPT | Performed by: STUDENT IN AN ORGANIZED HEALTH CARE EDUCATION/TRAINING PROGRAM

## 2024-11-15 PROCEDURE — 80053 COMPREHEN METABOLIC PANEL: CPT | Performed by: NURSE PRACTITIONER

## 2024-11-15 PROCEDURE — 80048 BASIC METABOLIC PNL TOTAL CA: CPT | Performed by: STUDENT IN AN ORGANIZED HEALTH CARE EDUCATION/TRAINING PROGRAM

## 2024-11-15 PROCEDURE — 84157 ASSAY OF PROTEIN OTHER: CPT | Performed by: STUDENT IN AN ORGANIZED HEALTH CARE EDUCATION/TRAINING PROGRAM

## 2024-11-15 RX ADMIN — OXYCODONE HYDROCHLORIDE 10 MG: 5 TABLET ORAL at 22:23

## 2024-11-15 RX ADMIN — PREDNISONE 5 MG: 5 TABLET ORAL at 09:21

## 2024-11-15 RX ADMIN — CIPROFLOXACIN 500 MG: 500 TABLET ORAL at 09:21

## 2024-11-15 RX ADMIN — HEPARIN, PORCINE (PF) 10 UNIT/ML INTRAVENOUS SYRINGE 5 ML: at 17:01

## 2024-11-15 RX ADMIN — INSULIN ASPART 1 UNITS: 100 INJECTION, SOLUTION INTRAVENOUS; SUBCUTANEOUS at 18:40

## 2024-11-15 RX ADMIN — ACETAMINOPHEN 650 MG: 325 TABLET, FILM COATED ORAL at 22:23

## 2024-11-15 RX ADMIN — ASPIRIN 81 MG CHEWABLE TABLET 81 MG: 81 TABLET CHEWABLE at 09:20

## 2024-11-15 RX ADMIN — METOPROLOL SUCCINATE 200 MG: 50 TABLET, EXTENDED RELEASE ORAL at 09:20

## 2024-11-15 RX ADMIN — PANTOPRAZOLE SODIUM 40 MG: 40 TABLET, DELAYED RELEASE ORAL at 09:21

## 2024-11-15 RX ADMIN — HEPARIN, PORCINE (PF) 10 UNIT/ML INTRAVENOUS SYRINGE 5 ML: at 04:30

## 2024-11-15 RX ADMIN — Medication 950 MG: at 11:24

## 2024-11-15 NOTE — PROGRESS NOTES
Canelo is a 74 year old who is being evaluated via a billable telephone visit.    What phone number would you like to be contacted at? [phone]  How would you like to obtain your AVS? Mail a copy  Originating Location (pt. Location): Other Hospital  I did make it clear to patient that since he is in the hospital currently insurance may or may not cover this telephone visit and he might get a bill. He replied with I can then pay them with chickens and eggs but I would still like to do the visit.     Distant Location (provider location):  On-site        Subjective   Canelo is a 74 year old, presenting for the following health issues:  RECHECK        11/15/2024     2:23 PM   Additional Questions   Roomed by Sofia CUELLAR    In hospital since 28 October    [place]    Swelled up    Couldn't move much     Patient realized insurance may not pay for phone visit while he is in hospital     Various medical teams seeing patient     Patient wondering about long term plans    Patient met with oncology in detail last night    [facility]          Objective           Vitals:  No vitals were obtained today due to virtual visit.    Physical Exam   General: Alert and no distress //Respiratory: No audible wheeze, cough, or shortness of breath // Psychiatric:  Appropriate affect, tone, and pace of words      ASSESSMENT / PLAN:  (F43.9) Stress  (primary encounter diagnosis)  Comment: patient has calmed down some from a few days ago.    Plan: I reassured patient.  He is getting good care.     (C61,  C79.51) Prostate cancer metastatic to bone (H)  Comment: patient has long discussion with onc.  Sounds like they can manage but not cure the cancer.   Plan: as above     (R53.1) Weakness  Comment: patient working on getting stronger and this will be an important goal when he is in rehab stay  Plan: as above    Does not sound like patient ready for hospice or palliative care at this point  Follow up with  me when out of rehab stay       I reviewed the patient's medications, allergies, medical history, family history, and social history.    Richi Jaramillo MD        Phone call duration: 15 minutes  Signed Electronically by: Richi Jaramillo MD

## 2024-11-15 NOTE — PROGRESS NOTES
[facility]    Medicine Progress Note - Hospitalist Service, GOLD TEAM 7    Date of Admission:  10/29/2024    Assessment & Plan   Shortness of Breath with Exertion  - high risk for DVT/PE given immobility, age, cancer diagnosis, but he's not tachycardic or hypoxemic and his [place] Criteria score is only 3 (8% risk); plt are low so DVT ppx has been on hold, though he is on aspirin  - leading diagnosis at this point is exacerbation of HCM due to over-diuresis; cardiology was reconsulted and is working on up-titrating his metoprolol (dose had been decreased a bit when he was getting actively diuresed on cards service) --> cards to recheck echo today  - VQ scan from 11/11/24 negative  - this has improved significantly and seems to have all but resolved with a higher dose of metoprolol + transfusion    Spontaneous Bacterial Peritonitis  Cirrhosis 2/2 HCV, Newly Decompensated, c/b ascites, SBP  Completed treatment with zosyn, now on ciprofloxacin ppx, see ID note from 11/7     RENNY, suspect prerenal, improved but stalled out Cr around 1.5-1.6  CKD Stage III  Baseline Cr ~0.8-1 recently  - diuretics on hold, will consider adding back maintence 11/12/24  - urine sodium low and urine concentrated 11/5, Cr improving with holding diuretic.      BRBPR 11/7  - likely hemorrhoidal given presence of internal hemorrhoids on rectal exam 11/8; hgb checked soon after was stable and vitals have shown consistent stability    Anemia of chronic disease, + possible small volume intraperitoneal bleed following paracentesis  - last tap was serosanguinous and hgb dropped about a gram overall; transfused 1 unit 11/10  - iron studies don't implicate deficiency; likely degree of splenic sequestration given plt count and liver disease  - continue to monitor for signs/symptoms consistent with GIB  - ok to transfuse for hgb < 7; cardiology recommending higher threshold of 8 g - discussed with patient and  will go ahead and give another unit given persistent light headedness with position change  - GI scoped 11/11/24- small varices but no source of GIB; sticking with hypothesis that he had a small intraperitoneal bleed s/p paracentesis which has resolved    Hypomagnesemia   ISO RENNY as above. RN protocol for replacement.     HCM with EDGAR with Valsalva LVOTO Gradient  Acute-on-Chronic HFpEF  HTN  Longstanding hx of HCM, recently established care w/ [facility] for HF. Has been struggling w/ worsening BLE edema, increased abdominal distension (weeping ascites), from poorly controlled hypervolemia. PTA diuretic regimen w/ Spironolactone was deemed inadequate, and given his difficulties w/ mobility at home and need for frequent appts/labs w/ titration of OP diuresis, decision was made to directly admit him instead for more aggressive titration of diuretic regimen w/ close cardiac monitoring. Started on Bumex drip here by Cards. Repeat ECHO 10/30/24 showing hyperkinetic w/ EF 65-70%, dynamic outflow tract obstruction, peak gradient 119mmHg at rest, grade II moderate diastolic dysfunction - on most recent echo 11/8 this gradient was lower (80) indicative of probable over-estimation on prior. Transferred to Medicine service 10/31 given SBP.              - Spironolactone and Bumex drip stopped 10/31; restart a daily bumex 11/14 with resolution of RENNY               - Hold PTA Valsartan 160mg daily w/ low BP, worsening LVOT gradient   - reduced PTA Metoprolol succinate 150mg daily to 125 mg daily, back up to 150 mg as of 11/8, 200 mg as of 11/9    - Avoid vasodilators               - Daily standing weights (per pt, a 'good' weight for him is 230 lbs)     Metastatic Prostate Cancer  Chronic Pancytopenia   Initially diagnosed earlier this year incidentally following a fall at home, fracturing his L femur. ORIF 05/24/24 and curretage samples c/w adenocarcinoma of prostate origin. Has since followed w/ Oncology as an OP, last saw  10/24/24. PTA on Daralutamide BID, Docetaxel (C4D1 was on 10/24/24) and Leuprolide [id number] (last 9/5/24). Also gets Neulasta, last 10/25. Has chronic pancytopenia, likely d/t multiple comorbidities (cirrhosis, CKD) and iatrogenic (chemotherapy). Baseline hgb ~8-9 recently.  - Oncology consulted here 10/31 and recommended to continue Daralutamide, they signed off, reconsulted as below on 11/13  - patient tells me his next docetaxel infusion. I inquired with oncology and they stated this is given out of the hospital which was very frustrating to the patient.   - Continue PTA Prednisone   - Transfuse for Hg now after discussion with hematology/oncology.  b <7      Type 2 Diabetes Mellitus, non-insulin-dependent, well-controlled  Last A1c 5.8% on 10/29/24. PTA Metformin. BG checks past 24 hours are all within goal (<180).   - Hold PTA Metformin given RENNY as above  - Continue medium sliding scale as started on admission  - BG checks TID AC + at bedtime + 0200  - Hypoglycemia protocol    Additional:   I spent approximately 90 minutes with Canelo and coordinating his care today.           Diet: Regular Diet Adult  Snacks/Supplements Adult: Other; If pt would like a snack or suppelment, please send; With Meals  Snacks/Supplements Adult: [employer]; Between Meals  Fluid restriction 1500 ML FLUID    DVT Prophylaxis: none due to bleeding risk  Rankin Catheter: Not present  Lines: PRESENT      Port a Cath 08/12/24 Single Lumen Right-Site Assessment: WDL      Cardiac Monitoring: None  Code Status: Full Code      Clinically Significant Risk Factors          # Hyperchloremia: Highest Cl = 108 mmol/L in last 2 days, will monitor as appropriate          # Hypoalbuminemia: Lowest albumin = 2.5 g/dL at 10/30/2024  8:33 AM, will monitor as appropriate   # Thrombocytopenia: Lowest platelets = 54 in last 2 days, will monitor for bleeding   # Hypertension: Noted on problem list            # Obesity: Estimated body mass index is 33.69  "kg/m  as calculated from the following:    Height as of 10/24/24: 1.778 m (5' 10\").    Weight as of this encounter: 106.5 kg (234 lb 12.8 oz).        # Financial/Environmental Concerns: none         Disposition Plan     Medically Ready for Discharge: Anticipated in 2-4 Days      Saumya Torres DO  Hospitalist Service, GOLD TEAM 7  [facility]  Securely message with [employer] (more info)  Text page via [email or web address] Paging/Directory   See signed in provider for up to date coverage information  ______________________________________________________________________    Interval History   No acute overnight events. His hemoglobin is again stable this morning. Creatinine is stable.     This morning became very upset that he was not going to be getting docetaxel infusion today due to this typically being administered outside of this hospital. He initially stating that he would like to leave the hospital and not go to TCU for rehab therapy. He stated that if he did not have time left due to cancer progression. He also stated to me that he does not feel that he has been helped at all during this hospitalization. Specifically said \"you people think you are helping me but really you are just wasting my time.\" I reiterated the barriers to discharge - rehab recommendations for discharge being rehab and no bed availability at TCU today. He also is likely to need paracentesis soon.    I spoke extensively with oncology team. See their separate note. He had PSMA PET scan 10/18 outpatient and this was reviewed by Dr. Plunkett on 10/24 at clinic visit per documentation. He stated that this was not reviewed with him and his impression of the PSMA PET/CT was that this is markedly worse and he is failing his current treatment regimen. I discussed with him that the impression is that PSMA uptake may be reflective of treatment-related flare effect. Canelo relayed to me that he does not trust PSA testing " because of history of this not being elevated in the past.     We discussed in detail that while he is medically ready for discharge, there is not a bed available at TCU today. I offered for him to be re-evaluated by PT/OT to see if they think he would be safe to go home if he prefers this. He declined because he did endorse that he would benefit from rehab.     Other consideration is he is nearing needing a paracentesis and we discussed the possibility of doing this on 11/15.     Physical Exam   Vital Signs: Temp: 98.5  F (36.9  C) Temp src: Oral BP: 117/55 Pulse: 72   Resp: 13 SpO2: 98 % O2 Device: None (Room air)    Weight: 234 lbs 12.8 oz    General Appearance: Appears chronically unwell, but no acute distress, sitting in recliner  Respiratory: normal RR, no acute distress   GI: abdomen is distended but still soft, nontender   Skin: lower extremities have lymphedema wraps in place    Medical Decision Making       95 MINUTES SPENT BY ME on the date of service doing chart review, history, exam, documentation & further activities per the note.      Data     I have personally reviewed the following data over the past 24 hrs:    4.1  \   8.2 (L)   / 56 (L)     139 108 (H) 45.0 (H) /  132 (H)   4.1 24 1.28 (H) \     ALT: 36 AST: 53 (H) AP: 209 (H) TBILI: 0.8   ALB: 2.6 (L) TOT PROTEIN: 4.5 (L) LIPASE: N/A       Imaging results reviewed over the past 24 hrs:   No results found for this or any previous visit (from the past 24 hours).

## 2024-11-15 NOTE — PROCEDURES
[facility]  CAPS PROCEDURE NOTE  Date of Admission:  10/29/2024  Consult Requested by: Medicine  Reason for Consult: diagnostic evaluation of ascites and management of symptomatic ascites    Indication/HPI: Ascites    Pre-Procedure Diagnosis: Ascites    Post-Procedure Diagnosis: Ascites    Risk Assessment: Average risk, Technically straightforward; patient's anticoagulation has been held according to guidelines based on the agent and platelets and coags are within guidelines    Procedure Outcome:  Diagnostic paracentesis performed and Therapeutic paracentesis performed with 2 liters of ascites removed. Serosanguinous fluid  See additional procedure details below.    The primary covering service should follow up and address any lab results as appropriate.    Mata Koo MD  [facility]  Securely message with [employer] (more info)  Text page via [employer] Paging/Directory   See signed in provider for up to date coverage information      [facility]    Procedure: Abdominal paracentesis    Date/Time: 11/15/2024 10:34 AM    Performed by: Mata Koo MD  Authorized by: Mata Koo MD      UNIVERSAL PROTOCOL   Site Marked: Yes  Prior Images Obtained and Reviewed:  Yes  Required items: Required blood products, implants, devices and special equipment available    Patient identity confirmed:  Verbally with patient and arm band  NA - No sedation, light sedation, or local anesthesia  Confirmation Checklist:  Correct equipment/implants were available, patient's identity using two indicators, relevant allergies and procedure was appropriate and matched the consent or emergent situation  Time out: Immediately prior to the procedure a time out was called    Universal Protocol: the Joint Commission Universal Protocol was followed    Preparation: Patient was prepped and  draped in usual sterile fashion       ANESTHESIA    Local Anesthetic:  Lidocaine 1% without epinephrine      SEDATION    Patient Sedated: No      PROCEDURE    Patient Tolerance:  Patient tolerated the procedure well with no immediate complications  Length of time physician/provider present for 1:1 monitoring during sedation: 0      POC US GUIDE FOR PARACENTESIS     Impression  US Indication: abdominal distension    Limited abdominal ultrasound was performed and demonstrated an adequate fluid collection on the left side of the abdomen.    Doppler of the skin demonstrated an area at this site without significant vasculature.  A paracentesis at this site was subsequently performed.    Mata Koo MD

## 2024-11-15 NOTE — PROGRESS NOTES
Care Management Discharge Note    Discharge Date: 11/15/2024       Discharge Disposition: Transitional Care    Discharge Services: None    Discharge DME: None    Discharge Transportation: [facility]    Private pay costs discussed: Not applicable    Does the patient's insurance plan have a 3 day qualifying hospital stay waiver?  No    Education Provided on the Discharge Plan:  Yes  Persons Notified of Discharge Plans: Patient  Patient/Family in Agreement with the Plan: yes    Handoff Referral Completed: No, handoff not indicated or clinically appropriate    Additional Information:  Patient discussed in IDT and with [facility] admissions for discharge planning. Pt reported to be medically ready and discussed bed availability with TCU admissions. Pt oncology medications were accepted by facility. [facility] reports they currently don't have bed availability but would review if they could accept over weekend.    SW will continue to follow for [facility] discharge.    NICHOLE Zelaya   11/15/2024       Social Work and Care Management Department       SEARCHABLE in [employer] - search SOCIAL WORK       Termo (2761 [phone] [id number])     Units: 6C Vocera       Units: ALL   - see above VOCERA links to units       NICHOLE Zelaya

## 2024-11-15 NOTE — PROGRESS NOTES
CLINICAL NUTRITION SERVICES - REASSESSMENT NOTE     Nutrition Prescription    RECOMMENDATIONS FOR MDs/PROVIDERS TO ORDER:  Order a multivitamin with minerals to help meet micronutrient needs.    Malnutrition Status:    Patient does not meet two of the established criteria necessary for diagnosing malnutrition    Recommendations already ordered by Registered Dietitian (RD):  None additionally at this time    Future/Additional Recommendations:  -Rec continue current diet, as ordered (liberalized to encourage oral intake but monitor need for sodium restriction). Encourage pt to self-select tolerated foods/beverages. Rec small, frequent meals and use of oral supplements or snacks. Encourage a variety of protein sources.   -Continue fluid restriction as per provider.  -H/o vitamin D deficiency. Rec check a vitamin D lab with a CRP in mid-March 2025. Likely does not need vitamin D supplementation at this time. Vitamin D total lab was 27 on 11/8/24, CRP of 16 on 11/8/24 (vit D lab is thus unreliable).   -Monitor BG control. Hgb A1c of 5.8 (10/29/24). BG was 172 on 11/15.  -Monitor need for iron supplementation, if warranted.  -Monitor stooling patterns and potential need for bowel regimen.       EVALUATION OF THE PROGRESS TOWARD GOALS   Diet: Regular since 11/4, 1.5 L fluid restriction 11/9. Ordered to receive a Special K bar, peanut butter chocolate at 14:00 and strawberry at HS.  Intake/Tolerance: Tolerating diet. Per nursing flowsheets (% intake), pt consuming 100% consistently with the exception of 75% once on 11/10 and once on 11/13. [employer]uso (meal ordering system) indicates food/beverages sent 11/12-11/14 totaled 2113 kcals and 83 g protein daily on average which meets estimated needs of 6135-8568 kcals/day (20 - 25 kcals/kg) and  grams protein/day (1 - 1.5 grams of pro/kg). Pt was sleeping during first two attempts and then busy with nursing staff during third attempt. Pt has food preferences due to LOS.  "    Nutrition Support: None so far this admission     NEW FINDINGS   Neuro: Forgetful at times  HEENT: Hard of hearing  WOC (11/15): \"Wound location: Fleshy buttocks. Wound due to: Friction and edema. STATUS: healing.\"  GI: Having one to three stool/s daily on average. Last Bowel Movement: 11/15/24. Stools are soft/formed and brown.   Weight History: 111.8 kg (10/10/2023), 115 kg (4/30/2024), 104.8 kg (7/18/2024), 104.8 kg (9/24/2024), 121.3 kg (10/29, admit), 107.2 kg (11/6), 112.1 kg (11/15) - Per provider H & P, hypervolemic on admit. Diuresis and approximately weekly paracenteses this admission. Pt has lost 2.5% of body wt over the last approximate six to seven months. Suspect fluid status changes and possibly some actual wt loss.      MALNUTRITION  % Intake: No decreased intake noted  % Weight Loss: Weight loss does not meet criteria. Difficult to assess wt changes with changes in fluid status, diuresis, and paracenteses  Subcutaneous Fat Loss: None observed but difficult to assess with wt status - per prior nutrition note  Muscle Loss: None observed but difficult to assess with wt status - per prior nutrition note  Fluid Accumulation/Edema: Does not meet criteria     Malnutrition Diagnosis: Patient does not meet two of the established criteria necessary for diagnosing malnutrition.    Previous Goals   Patient to consume % of nutritionally adequate meal trays TID, or the equivalent with supplements/snacks.  Evaluation: Meeting.    Previous Nutrition Diagnosis  Predicted inadequate nutrient intake (protein-energy) related to increased protein needs and food preferences with LOS.  Evaluation: Unresolved, unchanged.    CURRENT NUTRITION DIAGNOSIS  Predicted inadequate nutrient intake (protein-energy) related to increased protein needs and food preferences with LOS.     INTERVENTIONS  Implementation  None at this time    Goals  Patient to consume % of nutritionally adequate meal trays TID, or the " "equivalent with supplements/snacks.    Monitoring/Evaluation  Progress toward goals will be monitored and evaluated per protocol.     Aylin Quezada, MS, RD, LD, CNSC      No longer available via pager  6C (beds 8911-8560 and [id number]): Vocera \"6C Clinical Dietitian\"   Weekend/Holiday: Vocera \"Weekend Clinical Dietitian\"    "

## 2024-11-15 NOTE — PROGRESS NOTES
[facility]    Medicine Progress Note - Hospitalist Service, GOLD TEAM 7    Date of Admission:  10/29/2024    Assessment & Plan   Shortness of Breath with Exertion  - high risk for DVT/PE given immobility, age, cancer diagnosis, but he's not tachycardic or hypoxemic and his Marion Criteria score is only 3 (8% risk); plt are low so DVT ppx has been on hold, though he is on aspirin  - leading diagnosis at this point is exacerbation of HCM due to over-diuresis; cardiology was reconsulted and is working on up-titrating his metoprolol (dose had been decreased a bit when he was getting actively diuresed on cards service) --> cards to recheck echo today  - VQ scan from 11/11/24 negative  - this has improved significantly and seems to have all but resolved with a higher dose of metoprolol + transfusion    Spontaneous Bacterial Peritonitis  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   - Will hold further diuretic.   - Urine studies on 11/16 if still elevated     BRBPR 11/7  - likely hemorrhoidal given presence of internal hemorrhoids on rectal exam 11/8; hgb checked soon after was stable and vitals have shown consistent stability    Anemia of chronic disease, + possible small volume intraperitoneal bleed following paracentesis  - last tap was serosanguinous and hgb dropped about a gram overall; transfused 1 unit 11/10  - iron studies don't implicate deficiency; likely degree of splenic sequestration given plt count and liver disease  - continue to monitor for signs/symptoms consistent with GIB  - ok to transfuse for hgb < 7; cardiology recommending higher threshold of 8 g - discussed with  patient and will go ahead and give another unit given persistent light headedness with position change  - GI scoped 11/11/24- small varices but no source of GIB; sticking with hypothesis that he had a small intraperitoneal bleed s/p paracentesis which has resolved    Hypomagnesemia   ISO RENNY as above. RN protocol for replacement.     HCM with EDGAR with Valsalva LVOTO Gradient  Acute-on-Chronic HFpEF  HTN  Longstanding hx of HCM, recently established care w/ [facility] for HF. Has been struggling w/ worsening BLE edema, increased abdominal distension (weeping ascites), from poorly controlled hypervolemia. PTA diuretic regimen w/ Spironolactone was deemed inadequate, and given his difficulties w/ mobility at home and need for frequent appts/labs w/ titration of OP diuresis, decision was made to directly admit him instead for more aggressive titration of diuretic regimen w/ close cardiac monitoring. Started on Bumex drip here by Cards. Repeat ECHO 10/30/24 showing hyperkinetic w/ EF 65-70%, dynamic outflow tract obstruction, peak gradient 119mmHg at rest, grade II moderate diastolic dysfunction - on most recent echo 11/8 this gradient was lower (80) indicative of probable over-estimation on prior. Transferred to Medicine service 10/31 given SBP.              - Spironolactone and Bumex drip stopped 10/31; restart a daily bumex 11/14 with resolution of RENNY               - Hold PTA Valsartan 160mg daily w/ low BP, worsening LVOT gradient   - reduced PTA Metoprolol succinate 150mg daily to 125 mg daily, back up to 150 mg as of 11/8, 200 mg as of 11/9    - Avoid vasodilators               - Daily standing weights (per pt, a 'good' weight for him is 230 lbs)     Metastatic Prostate Cancer  Chronic Pancytopenia   Initially diagnosed earlier this year incidentally following a fall at home, fracturing his L femur. ORIF 05/24/24 and curretage samples c/w adenocarcinoma of prostate origin. Has since followed w/ Oncology as an OP,  last saw 10/24/24. PTA on Daralutamide BID, Docetaxel (C4D1 was on 10/24/24) and Leuprolide E2qhloei (last 9/5/24). Also gets Neulasta, last 10/25. Has chronic pancytopenia, likely d/t multiple comorbidities (cirrhosis, CKD) and iatrogenic (chemotherapy). Baseline hgb ~8-9 recently.  - Oncology consulted here 10/31 and recommended to continue Daralutamide, they signed off, reconsulted as below on 11/13  - patient tells me his next docetaxel infusion. Dr. Plunkett stopped by and patient now has good understanding that he does not need this medication and has confidence in his plan. Appreciate Dr. Plunkett clarifying.    - Continue PTA Prednisone   - Transfuse for Hg now after discussion with hematology/oncology.  b <7      Type 2 Diabetes Mellitus, non-insulin-dependent, well-controlled  Last A1c 5.8% on 10/29/24. PTA Metformin. BG checks past 24 hours are all within goal (<180).   - Hold PTA Metformin given RENNY as above  - Continue medium sliding scale as started on admission  - BG checks TID AC + at bedtime + 0200  - Hypoglycemia protocol          Diet: Regular Diet Adult  Snacks/Supplements Adult: Other; If pt would like a snack or suppelment, please send; With Meals  Snacks/Supplements Adult: Special K Bar; Between Meals  Fluid restriction 1500 ML FLUID    DVT Prophylaxis: none due to bleeding risk  Rankin Catheter: Not present  Lines: PRESENT      Port a Cath 08/12/24 Single Lumen Right-Site Assessment: WDL      Cardiac Monitoring: None  Code Status: Full Code      Clinically Significant Risk Factors          # Hyperchloremia: Highest Cl = 110 mmol/L in last 2 days, will monitor as appropriate          # Hypoalbuminemia: Lowest albumin = 2.5 g/dL at 10/30/2024  8:33 AM, will monitor as appropriate   # Thrombocytopenia: Lowest platelets = 55 in last 2 days, will monitor for bleeding   # Hypertension: Noted on problem list            # Obesity: Estimated body mass index is 35.46 kg/m  as calculated from the  "following:    Height as of 10/24/24: 1.778 m (5' 10\").    Weight as of this encounter: 112.1 kg (247 lb 1.6 oz).        # Financial/Environmental Concerns: none         Disposition Plan     Medically Ready for Discharge: Anticipated Tomorrow      Saumya Torres DO  Hospitalist Service, GOLD TEAM 7  [facility]  Securely message with allyve (more info)  Text page via [employer] Paging/Directory   See signed in provider for up to date coverage information  ______________________________________________________________________    Interval History     Canelo had a paracentesis this morning to decompress abdomen. 2 L removed today     Physical Exam   Vital Signs: Temp: 98  F (36.7  C) Temp src: Oral BP: 112/53 Pulse: 71   Resp: 16 SpO2: 95 % O2 Device: None (Room air)    Weight: 247 lbs 1.6 oz    General Appearance: Appears chronically unwell, but no acute distress, sitting in bed during paracentesis   Respiratory: normal RR, no acute distress   GI: abdomen is distended and currently undergoing paracentesis  Skin: lower extremities have lymphedema wraps in place    Medical Decision Making       95 MINUTES SPENT BY ME on the date of service doing chart review, history, exam, documentation & further activities per the note.      Data     I have personally reviewed the following data over the past 24 hrs:    4.6  \   8.4 (L)   / 55 (L)     143 110 (H) 45.9 (H) /  116 (H)   4.2 24 1.62 (H) \     ALT: 44 AST: 69 (H) AP: 248 (H) TBILI: 0.5   ALB: 2.7 (L) TOT PROTEIN: 4.8 (L) LIPASE: N/A       Imaging results reviewed over the past 24 hrs:   Recent Results (from the past 24 hours)   POC US GUIDE FOR PARACENTESIS    Impression    US Indication: abdominal distension    Limited abdominal ultrasound was performed and demonstrated an adequate fluid collection on the left side of the abdomen.     Doppler of the skin demonstrated an area at this site without significant vasculature.  A " paracentesis at this site was subsequently performed.    Mata Koo MD

## 2024-11-15 NOTE — PLAN OF CARE
HOURS OF CARE: [id number]           TEAM: Gold Team 7     SHIFT EVENTS:    VS: /50 (BP Location: Right arm)   Pulse 70   Temp 98.5  F (36.9  C) (Oral)   Resp 17   Wt 106.5 kg (234 lb 12.8 oz)   SpO2 95%   BMI 33.69 kg/m        PAIN: Pt complain of pain of the hip and back 6/10             -PRN Oxy 10mg given x1             -PRN Tylenol given x1    NEURO: A&O x4, calls appropriately   RESP: On RA, tolerating it well. Denies SOB  CARDIAC: Sinus Rhythm, HR 70's, denies chest pains  DIET: Reg Diet w/ 1.5 L FR   B.S. CHECKS: ACHS  LBM: 11/14  : Voids spontaneously, uses urinal at the bedside   ACTIVITY: Assist x1 w/ gb and walker   SKIN: no new deficits found   LDA'S: Single Lumen R. Port Cath     DRIPS:    PLAN: Pt is waiting TCU placement. Continue to follow plan of care, notify team with any changes.     ELECTROLYTE REPLACEMENTS: K and Mg protocols: No replacements needed this morning        Goal Outcome Evaluation:           Overall Patient Progress: no change  Outcome Evaluation: VVS on RA, waiting on TCU placement.      Problem: Adult Inpatient Plan of Care  Goal: Absence of Hospital-Acquired Illness or Injury  Intervention: Identify and Manage Fall Risk  Recent Flowsheet Documentation  Taken 11/15/2024 0000 by Jenelle Mendiola RN  Safety Promotion/Fall Prevention:   activity supervised   assistive device/personal items within reach   mobility aid in reach   room door open   patient and family education  Taken 11/14/2024 2021 by Jenelle Mendiola RN  Safety Promotion/Fall Prevention:   activity supervised   assistive device/personal items within reach   mobility aid in reach   room door open   patient and family education     Problem: Heart Failure  Goal: Stable Heart Rate and Rhythm  Outcome: Progressing     Problem: Heart Failure  Goal: Fluid and Electrolyte Balance  Outcome: Progressing  Intervention: Monitor and Manage Fluid and Electrolyte Balance  Recent Flowsheet Documentation  Taken 11/15/2024  0000 by Jenelle Mendiola RN  Fluid/Electrolyte Management: fluids restricted  Taken 11/14/2024 2021 by Jenelle Mendiola RN  Fluid/Electrolyte Management: fluids restricted

## 2024-11-16 ENCOUNTER — HOSPITAL ENCOUNTER (INPATIENT)
Facility: SKILLED NURSING FACILITY | Age: 74
DRG: 314 | End: 2024-11-16
Attending: INTERNAL MEDICINE | Admitting: INTERNAL MEDICINE
Payer: COMMERCIAL

## 2024-11-16 ENCOUNTER — APPOINTMENT (OUTPATIENT)
Dept: OCCUPATIONAL THERAPY | Facility: CLINIC | Age: 74
End: 2024-11-16
Attending: INTERNAL MEDICINE
Payer: COMMERCIAL

## 2024-11-16 VITALS
SYSTOLIC BLOOD PRESSURE: 106 MMHG | BODY MASS INDEX: 35.46 KG/M2 | WEIGHT: 247.1 LBS | TEMPERATURE: 98.1 F | DIASTOLIC BLOOD PRESSURE: 55 MMHG | RESPIRATION RATE: 15 BRPM | HEART RATE: 62 BPM | OXYGEN SATURATION: 98 %

## 2024-11-16 DIAGNOSIS — C61 PROSTATE CANCER (H): ICD-10-CM

## 2024-11-16 DIAGNOSIS — R18.8 CIRRHOSIS OF LIVER WITH ASCITES, UNSPECIFIED HEPATIC CIRRHOSIS TYPE (H): ICD-10-CM

## 2024-11-16 DIAGNOSIS — M11.20 CALCIUM PYROPHOSPHATE DEPOSITION DISEASE: Primary | ICD-10-CM

## 2024-11-16 DIAGNOSIS — R53.81 PHYSICAL DECONDITIONING: ICD-10-CM

## 2024-11-16 DIAGNOSIS — K59.03 DRUG-INDUCED CONSTIPATION: ICD-10-CM

## 2024-11-16 DIAGNOSIS — K21.9 GASTROESOPHAGEAL REFLUX DISEASE WITHOUT ESOPHAGITIS: ICD-10-CM

## 2024-11-16 DIAGNOSIS — K74.60 CIRRHOSIS OF LIVER WITH ASCITES, UNSPECIFIED HEPATIC CIRRHOSIS TYPE (H): ICD-10-CM

## 2024-11-16 DIAGNOSIS — M25.552 HIP PAIN, LEFT: ICD-10-CM

## 2024-11-16 DIAGNOSIS — I50.9 HEART FAILURE, UNSPECIFIED HF CHRONICITY, UNSPECIFIED HEART FAILURE TYPE (H): ICD-10-CM

## 2024-11-16 LAB
ALBUMIN SERPL BCG-MCNC: 2.6 G/DL (ref 3.5–5.2)
ALP SERPL-CCNC: 221 U/L (ref 40–150)
ALT SERPL W P-5'-P-CCNC: 37 U/L (ref 0–70)
ANION GAP SERPL CALCULATED.3IONS-SCNC: 7 MMOL/L (ref 7–15)
AST SERPL W P-5'-P-CCNC: 55 U/L (ref 0–45)
BILIRUB DIRECT SERPL-MCNC: <0.2 MG/DL (ref 0–0.3)
BILIRUB SERPL-MCNC: 0.6 MG/DL
BUN SERPL-MCNC: 43.6 MG/DL (ref 8–23)
CALCIUM SERPL-MCNC: 7.8 MG/DL (ref 8.8–10.4)
CHLORIDE SERPL-SCNC: 110 MMOL/L (ref 98–107)
CREAT SERPL-MCNC: 1.51 MG/DL (ref 0.67–1.17)
EGFRCR SERPLBLD CKD-EPI 2021: 48 ML/MIN/1.73M2
ERYTHROCYTE [DISTWIDTH] IN BLOOD BY AUTOMATED COUNT: 21 % (ref 10–15)
GLUCOSE BLDC GLUCOMTR-MCNC: 109 MG/DL (ref 70–99)
GLUCOSE BLDC GLUCOMTR-MCNC: 118 MG/DL (ref 70–99)
GLUCOSE BLDC GLUCOMTR-MCNC: 126 MG/DL (ref 70–99)
GLUCOSE BLDC GLUCOMTR-MCNC: 155 MG/DL (ref 70–99)
GLUCOSE BLDC GLUCOMTR-MCNC: 159 MG/DL (ref 70–99)
GLUCOSE BLDC GLUCOMTR-MCNC: 162 MG/DL (ref 70–99)
GLUCOSE SERPL-MCNC: 126 MG/DL (ref 70–99)
HCO3 SERPL-SCNC: 25 MMOL/L (ref 22–29)
HCT VFR BLD AUTO: 25.3 % (ref 40–53)
HGB BLD-MCNC: 7.9 G/DL (ref 13.3–17.7)
MAGNESIUM SERPL-MCNC: 2.3 MG/DL (ref 1.7–2.3)
MCH RBC QN AUTO: 33.1 PG (ref 26.5–33)
MCHC RBC AUTO-ENTMCNC: 31.2 G/DL (ref 31.5–36.5)
MCV RBC AUTO: 106 FL (ref 78–100)
PLATELET # BLD AUTO: 51 10E3/UL (ref 150–450)
POTASSIUM SERPL-SCNC: 4 MMOL/L (ref 3.4–5.3)
PROT SERPL-MCNC: 4.4 G/DL (ref 6.4–8.3)
RBC # BLD AUTO: 2.39 10E6/UL (ref 4.4–5.9)
SODIUM SERPL-SCNC: 142 MMOL/L (ref 135–145)
WBC # BLD AUTO: 3.6 10E3/UL (ref 4–11)

## 2024-11-16 PROCEDURE — 250N000013 HC RX MED GY IP 250 OP 250 PS 637: Performed by: STUDENT IN AN ORGANIZED HEALTH CARE EDUCATION/TRAINING PROGRAM

## 2024-11-16 PROCEDURE — 250N000013 HC RX MED GY IP 250 OP 250 PS 637: Performed by: PEDIATRICS

## 2024-11-16 PROCEDURE — 82248 BILIRUBIN DIRECT: CPT | Performed by: NURSE PRACTITIONER

## 2024-11-16 PROCEDURE — 250N000013 HC RX MED GY IP 250 OP 250 PS 637: Performed by: NURSE PRACTITIONER

## 2024-11-16 PROCEDURE — 97535 SELF CARE MNGMENT TRAINING: CPT | Mod: GO

## 2024-11-16 PROCEDURE — 83735 ASSAY OF MAGNESIUM: CPT | Performed by: STUDENT IN AN ORGANIZED HEALTH CARE EDUCATION/TRAINING PROGRAM

## 2024-11-16 PROCEDURE — 250N000013 HC RX MED GY IP 250 OP 250 PS 637: Performed by: INTERNAL MEDICINE

## 2024-11-16 PROCEDURE — 120N000009 HC R&B SNF

## 2024-11-16 PROCEDURE — 250N000012 HC RX MED GY IP 250 OP 636 PS 637: Performed by: NURSE PRACTITIONER

## 2024-11-16 PROCEDURE — 80053 COMPREHEN METABOLIC PANEL: CPT | Performed by: NURSE PRACTITIONER

## 2024-11-16 PROCEDURE — 93005 ELECTROCARDIOGRAM TRACING: CPT

## 2024-11-16 PROCEDURE — 250N000011 HC RX IP 250 OP 636: Performed by: NURSE PRACTITIONER

## 2024-11-16 PROCEDURE — 250N000013 HC RX MED GY IP 250 OP 250 PS 637: Performed by: PHYSICIAN ASSISTANT

## 2024-11-16 PROCEDURE — 85048 AUTOMATED LEUKOCYTE COUNT: CPT | Performed by: NURSE PRACTITIONER

## 2024-11-16 PROCEDURE — 85014 HEMATOCRIT: CPT | Performed by: NURSE PRACTITIONER

## 2024-11-16 PROCEDURE — 99239 HOSP IP/OBS DSCHRG MGMT >30: CPT | Performed by: STUDENT IN AN ORGANIZED HEALTH CARE EDUCATION/TRAINING PROGRAM

## 2024-11-16 RX ORDER — ASPIRIN 81 MG/1
81 TABLET, CHEWABLE ORAL DAILY
Status: DISCONTINUED | OUTPATIENT
Start: 2024-11-17 | End: 2024-12-06 | Stop reason: HOSPADM

## 2024-11-16 RX ORDER — PROCHLORPERAZINE MALEATE 5 MG/1
5 TABLET ORAL EVERY 6 HOURS PRN
Status: CANCELLED | OUTPATIENT
Start: 2024-11-16

## 2024-11-16 RX ORDER — NALOXONE HYDROCHLORIDE 0.4 MG/ML
0.2 INJECTION, SOLUTION INTRAMUSCULAR; INTRAVENOUS; SUBCUTANEOUS
Status: DISCONTINUED | OUTPATIENT
Start: 2024-11-16 | End: 2024-12-06 | Stop reason: HOSPADM

## 2024-11-16 RX ORDER — NALOXONE HYDROCHLORIDE 0.4 MG/ML
0.4 INJECTION, SOLUTION INTRAMUSCULAR; INTRAVENOUS; SUBCUTANEOUS
Status: DISCONTINUED | OUTPATIENT
Start: 2024-11-16 | End: 2024-12-06 | Stop reason: HOSPADM

## 2024-11-16 RX ORDER — METHOCARBAMOL 500 MG/1
500 TABLET, FILM COATED ORAL EVERY 6 HOURS PRN
Status: CANCELLED | OUTPATIENT
Start: 2024-11-16

## 2024-11-16 RX ORDER — METOPROLOL SUCCINATE 50 MG/1
200 TABLET, EXTENDED RELEASE ORAL DAILY
Status: DISCONTINUED | OUTPATIENT
Start: 2024-11-17 | End: 2024-12-06 | Stop reason: HOSPADM

## 2024-11-16 RX ORDER — PANTOPRAZOLE SODIUM 40 MG/1
40 TABLET, DELAYED RELEASE ORAL
Status: CANCELLED | OUTPATIENT
Start: 2024-11-17

## 2024-11-16 RX ORDER — HEPARIN SODIUM,PORCINE 10 UNIT/ML
5-10 VIAL (ML) INTRAVENOUS EVERY 24 HOURS
Status: CANCELLED | OUTPATIENT
Start: 2024-11-17

## 2024-11-16 RX ORDER — NITROGLYCERIN 0.4 MG/1
0.4 TABLET SUBLINGUAL EVERY 5 MIN PRN
Status: CANCELLED | OUTPATIENT
Start: 2024-11-16

## 2024-11-16 RX ORDER — AMOXICILLIN 250 MG
1 CAPSULE ORAL 2 TIMES DAILY PRN
Status: DISCONTINUED | OUTPATIENT
Start: 2024-11-16 | End: 2024-12-06 | Stop reason: HOSPADM

## 2024-11-16 RX ORDER — ACETAMINOPHEN 325 MG/1
650 TABLET ORAL EVERY 4 HOURS PRN
Status: DISCONTINUED | OUTPATIENT
Start: 2024-11-16 | End: 2024-12-06 | Stop reason: HOSPADM

## 2024-11-16 RX ORDER — CALCIUM CARBONATE 500 MG/1
500 TABLET, CHEWABLE ORAL DAILY PRN
Status: CANCELLED | OUTPATIENT
Start: 2024-11-16

## 2024-11-16 RX ORDER — IBUPROFEN 200 MG
950 CAPSULE ORAL DAILY
Status: DISCONTINUED | OUTPATIENT
Start: 2024-11-17 | End: 2024-12-06 | Stop reason: HOSPADM

## 2024-11-16 RX ORDER — NALOXONE HYDROCHLORIDE 0.4 MG/ML
0.4 INJECTION, SOLUTION INTRAMUSCULAR; INTRAVENOUS; SUBCUTANEOUS
Status: CANCELLED | OUTPATIENT
Start: 2024-11-16

## 2024-11-16 RX ORDER — ASPIRIN 81 MG/1
81 TABLET, CHEWABLE ORAL DAILY
Status: CANCELLED | OUTPATIENT
Start: 2024-11-17

## 2024-11-16 RX ORDER — IBUPROFEN 200 MG
950 CAPSULE ORAL DAILY
Status: CANCELLED | OUTPATIENT
Start: 2024-11-17

## 2024-11-16 RX ORDER — HEPARIN SODIUM,PORCINE 10 UNIT/ML
5-10 VIAL (ML) INTRAVENOUS EVERY 24 HOURS
Status: DISCONTINUED | OUTPATIENT
Start: 2024-11-17 | End: 2024-11-17

## 2024-11-16 RX ORDER — OXYCODONE HYDROCHLORIDE 5 MG/1
5-10 TABLET ORAL EVERY 6 HOURS PRN
Status: CANCELLED | OUTPATIENT
Start: 2024-11-16

## 2024-11-16 RX ORDER — DEXTROSE MONOHYDRATE 25 G/50ML
25-50 INJECTION, SOLUTION INTRAVENOUS
Status: DISCONTINUED | OUTPATIENT
Start: 2024-11-16 | End: 2024-12-06 | Stop reason: HOSPADM

## 2024-11-16 RX ORDER — LANOLIN ALCOHOL/MO/W.PET/CERES
CREAM (GRAM) TOPICAL
Status: DISCONTINUED | OUTPATIENT
Start: 2024-11-16 | End: 2024-12-06 | Stop reason: HOSPADM

## 2024-11-16 RX ORDER — METOPROLOL SUCCINATE 50 MG/1
200 TABLET, EXTENDED RELEASE ORAL DAILY
Status: CANCELLED | OUTPATIENT
Start: 2024-11-17

## 2024-11-16 RX ORDER — CIPROFLOXACIN 500 MG/1
500 TABLET, FILM COATED ORAL
Status: CANCELLED | OUTPATIENT
Start: 2024-11-17

## 2024-11-16 RX ORDER — NICOTINE POLACRILEX 4 MG
15-30 LOZENGE BUCCAL
Status: DISCONTINUED | OUTPATIENT
Start: 2024-11-16 | End: 2024-12-06 | Stop reason: HOSPADM

## 2024-11-16 RX ORDER — ACETAMINOPHEN 325 MG/1
650 TABLET ORAL EVERY 4 HOURS PRN
Status: CANCELLED | OUTPATIENT
Start: 2024-11-16

## 2024-11-16 RX ORDER — NALOXONE HYDROCHLORIDE 0.4 MG/ML
0.2 INJECTION, SOLUTION INTRAMUSCULAR; INTRAVENOUS; SUBCUTANEOUS
Status: CANCELLED | OUTPATIENT
Start: 2024-11-16

## 2024-11-16 RX ORDER — DEXTROSE MONOHYDRATE 25 G/50ML
25-50 INJECTION, SOLUTION INTRAVENOUS
Status: CANCELLED | OUTPATIENT
Start: 2024-11-16

## 2024-11-16 RX ORDER — ACETAMINOPHEN 650 MG/1
650 SUPPOSITORY RECTAL EVERY 4 HOURS PRN
Status: DISCONTINUED | OUTPATIENT
Start: 2024-11-16 | End: 2024-11-29

## 2024-11-16 RX ORDER — PREDNISONE 5 MG/1
5 TABLET ORAL DAILY
Status: DISCONTINUED | OUTPATIENT
Start: 2024-11-17 | End: 2024-12-06 | Stop reason: HOSPADM

## 2024-11-16 RX ORDER — METHOCARBAMOL 500 MG/1
500 TABLET, FILM COATED ORAL EVERY 6 HOURS PRN
Status: DISCONTINUED | OUTPATIENT
Start: 2024-11-16 | End: 2024-12-06 | Stop reason: HOSPADM

## 2024-11-16 RX ORDER — NITROGLYCERIN 0.4 MG/1
0.4 TABLET SUBLINGUAL EVERY 5 MIN PRN
Status: DISCONTINUED | OUTPATIENT
Start: 2024-11-16 | End: 2024-12-06 | Stop reason: HOSPADM

## 2024-11-16 RX ORDER — FAMOTIDINE 20 MG/1
20 TABLET, FILM COATED ORAL 2 TIMES DAILY PRN
Status: DISCONTINUED | OUTPATIENT
Start: 2024-11-16 | End: 2024-11-16

## 2024-11-16 RX ORDER — CIPROFLOXACIN 500 MG/1
500 TABLET, FILM COATED ORAL
Status: DISCONTINUED | OUTPATIENT
Start: 2024-11-17 | End: 2024-12-06 | Stop reason: HOSPADM

## 2024-11-16 RX ORDER — AMOXICILLIN 250 MG
2 CAPSULE ORAL 2 TIMES DAILY PRN
Status: DISCONTINUED | OUTPATIENT
Start: 2024-11-16 | End: 2024-12-06 | Stop reason: HOSPADM

## 2024-11-16 RX ORDER — MAGNESIUM HYDROXIDE/ALUMINUM HYDROXICE/SIMETHICONE 120; 1200; 1200 MG/30ML; MG/30ML; MG/30ML
30 SUSPENSION ORAL EVERY 4 HOURS PRN
Status: CANCELLED | OUTPATIENT
Start: 2024-11-16

## 2024-11-16 RX ORDER — HEPARIN SODIUM,PORCINE 10 UNIT/ML
5-10 VIAL (ML) INTRAVENOUS
Status: CANCELLED | OUTPATIENT
Start: 2024-11-16

## 2024-11-16 RX ORDER — OXYCODONE HYDROCHLORIDE 5 MG/1
5-10 TABLET ORAL EVERY 6 HOURS PRN
Status: DISCONTINUED | OUTPATIENT
Start: 2024-11-16 | End: 2024-12-06 | Stop reason: HOSPADM

## 2024-11-16 RX ORDER — POLYETHYLENE GLYCOL 3350 17 G/17G
17 POWDER, FOR SOLUTION ORAL 2 TIMES DAILY
Status: DISCONTINUED | OUTPATIENT
Start: 2024-11-16 | End: 2024-11-22

## 2024-11-16 RX ORDER — FAMOTIDINE 20 MG/1
20 TABLET, FILM COATED ORAL 2 TIMES DAILY PRN
Status: CANCELLED | OUTPATIENT
Start: 2024-11-16

## 2024-11-16 RX ORDER — PROCHLORPERAZINE MALEATE 5 MG/1
5 TABLET ORAL EVERY 6 HOURS PRN
Status: ACTIVE | OUTPATIENT
Start: 2024-11-16 | End: 2024-11-30

## 2024-11-16 RX ORDER — LIDOCAINE 40 MG/G
CREAM TOPICAL
Status: DISCONTINUED | OUTPATIENT
Start: 2024-11-16 | End: 2024-12-06 | Stop reason: HOSPADM

## 2024-11-16 RX ORDER — MAGNESIUM HYDROXIDE/ALUMINUM HYDROXICE/SIMETHICONE 120; 1200; 1200 MG/30ML; MG/30ML; MG/30ML
30 SUSPENSION ORAL EVERY 4 HOURS PRN
Status: DISCONTINUED | OUTPATIENT
Start: 2024-11-16 | End: 2024-12-06 | Stop reason: HOSPADM

## 2024-11-16 RX ORDER — PANTOPRAZOLE SODIUM 40 MG/1
40 TABLET, DELAYED RELEASE ORAL
Status: DISCONTINUED | OUTPATIENT
Start: 2024-11-17 | End: 2024-12-06 | Stop reason: HOSPADM

## 2024-11-16 RX ORDER — LIDOCAINE 40 MG/G
CREAM TOPICAL
Status: CANCELLED | OUTPATIENT
Start: 2024-11-16

## 2024-11-16 RX ORDER — HEPARIN SODIUM (PORCINE) LOCK FLUSH IV SOLN 100 UNIT/ML 100 UNIT/ML
5-10 SOLUTION INTRAVENOUS
Status: CANCELLED | OUTPATIENT
Start: 2024-11-26

## 2024-11-16 RX ORDER — ACETAMINOPHEN 650 MG/1
650 SUPPOSITORY RECTAL EVERY 4 HOURS PRN
Status: CANCELLED | OUTPATIENT
Start: 2024-11-16

## 2024-11-16 RX ORDER — POLYETHYLENE GLYCOL 3350 17 G/17G
17 POWDER, FOR SOLUTION ORAL 2 TIMES DAILY
Status: CANCELLED | OUTPATIENT
Start: 2024-11-16

## 2024-11-16 RX ORDER — AMOXICILLIN 250 MG
2 CAPSULE ORAL 2 TIMES DAILY PRN
Status: CANCELLED | OUTPATIENT
Start: 2024-11-16

## 2024-11-16 RX ORDER — AMOXICILLIN 250 MG
1 CAPSULE ORAL 2 TIMES DAILY PRN
Status: CANCELLED | OUTPATIENT
Start: 2024-11-16

## 2024-11-16 RX ORDER — HEPARIN SODIUM,PORCINE 10 UNIT/ML
5-10 VIAL (ML) INTRAVENOUS
Status: DISCONTINUED | OUTPATIENT
Start: 2024-11-16 | End: 2024-12-06 | Stop reason: HOSPADM

## 2024-11-16 RX ORDER — PREDNISONE 5 MG/1
5 TABLET ORAL DAILY
Status: CANCELLED | OUTPATIENT
Start: 2024-11-17

## 2024-11-16 RX ORDER — HEPARIN SODIUM (PORCINE) LOCK FLUSH IV SOLN 100 UNIT/ML 100 UNIT/ML
5-10 SOLUTION INTRAVENOUS
Status: DISCONTINUED | OUTPATIENT
Start: 2024-11-26 | End: 2024-12-06 | Stop reason: HOSPADM

## 2024-11-16 RX ORDER — CALCIUM CARBONATE 500 MG/1
500 TABLET, CHEWABLE ORAL DAILY PRN
Status: DISCONTINUED | OUTPATIENT
Start: 2024-11-16 | End: 2024-12-06 | Stop reason: HOSPADM

## 2024-11-16 RX ORDER — NICOTINE POLACRILEX 4 MG
15-30 LOZENGE BUCCAL
Status: CANCELLED | OUTPATIENT
Start: 2024-11-16

## 2024-11-16 RX ORDER — FAMOTIDINE 20 MG/1
20 TABLET, FILM COATED ORAL 2 TIMES DAILY PRN
Status: DISCONTINUED | OUTPATIENT
Start: 2024-11-16 | End: 2024-12-06 | Stop reason: HOSPADM

## 2024-11-16 RX ADMIN — CIPROFLOXACIN 500 MG: 500 TABLET ORAL at 08:09

## 2024-11-16 RX ADMIN — ACETAMINOPHEN 650 MG: 325 TABLET, FILM COATED ORAL at 22:36

## 2024-11-16 RX ADMIN — OXYCODONE HYDROCHLORIDE 10 MG: 5 TABLET ORAL at 03:23

## 2024-11-16 RX ADMIN — INSULIN ASPART 1 UNITS: 100 INJECTION, SOLUTION INTRAVENOUS; SUBCUTANEOUS at 12:44

## 2024-11-16 RX ADMIN — PANTOPRAZOLE SODIUM 40 MG: 40 TABLET, DELAYED RELEASE ORAL at 08:09

## 2024-11-16 RX ADMIN — PREDNISONE 5 MG: 5 TABLET ORAL at 08:09

## 2024-11-16 RX ADMIN — METOPROLOL SUCCINATE 200 MG: 50 TABLET, EXTENDED RELEASE ORAL at 08:10

## 2024-11-16 RX ADMIN — ASPIRIN 81 MG CHEWABLE TABLET 81 MG: 81 TABLET CHEWABLE at 08:09

## 2024-11-16 RX ADMIN — ANTACID TABLETS 500 MG: 500 TABLET, CHEWABLE ORAL at 14:06

## 2024-11-16 RX ADMIN — Medication 950 MG: at 10:40

## 2024-11-16 RX ADMIN — OXYCODONE HYDROCHLORIDE 5 MG: 5 TABLET ORAL at 22:36

## 2024-11-16 RX ADMIN — ACETAMINOPHEN 650 MG: 325 TABLET, FILM COATED ORAL at 03:23

## 2024-11-16 RX ADMIN — Medication 5 ML: at 14:44

## 2024-11-16 NOTE — PHARMACY-ADMISSION MEDICATION HISTORY
Admission medication history completed at [facility]. Please see Pharmacy Intern Admission Medication History note from 10/29/2024.

## 2024-11-16 NOTE — PROGRESS NOTES
Care Management Follow Up    Length of Stay (days): 18    Expected Discharge Date: 11/16/2024     Concerns to be Addressed: discharge planning     Patient plan of care discussed at interdisciplinary rounds: Yes    Anticipated Discharge Disposition: TCU  Anticipated Discharge Services: TCU therapy services  Anticipated Discharge DME: n/a    Patient/family educated on Medicare website which has current facility and service quality ratings: yes  Education Provided on the Discharge Plan: Yes.    Mid morning, MITUL met with the pt at bedside and updated him on no TCU bed availability. Pt voiced understandable frustration with having to wait for a TCU bed but is understanding. SW to update pt once a TCU bed is available.       Patient/Family in Agreement with the Plan: yes    [facility]SW:      [facility] [address] [address]  4th floor of [facility]  Phone: [phone]  Nurse to Nurse: [phone]  - 11/16: MITUL called admissions at 8:29am and spoke to Zita in admissions. MITUL updated Zita on pt being medically ready and per Zita, no TCU beds are available today but that she'll update the SW as soon as a bed becomes available for the pt.     MITUL received a call from [place] again at 10:30am telling the SW that there was an acute discharge over at [facility] so there should be a bed available for the pt. She requested MITUL to set up a ride around 4pm today and she'll review pt with  TCU hospitalist. MITUL is awaiting an update from [facility] admissions with final update.     Private pay costs discussed: Not applicable    Discussed  Partnership in Safe Discharge Planning  document with patient/family: No     Handoff Completed: No, handoff not indicated or clinically appropriate    Additional Information:  Pt is medically ready for discharge.     Next Steps: MITUL to set up 4pm wheelchair ride and complete PAS.     ___________________    IVANIA Lloyd, JOE  6C , covering beds 3171 to 6545  M  [facility]   Phone: [phone]  6C Cards MITUL [place]

## 2024-11-16 NOTE — PROGRESS NOTES
Care Management Discharge Note    Discharge Date: 11/16/2024       Discharge Disposition: Transitional Care    [facility]  [address] [place] [id number]  4th floor of building  Phone: [phone]  Nurse to Nurse: [phone]     Discharge Services:  TCU therapy services    Discharge DME: None    Discharge Transportation: [facility] (Phone: [phone]) wheelchair ride with a pickup window between 3:37pm to 4:20pm. MITUL called transport at 10:43am and scheduled ride.    Private pay costs discussed: Not applicable    Does the patient's insurance plan have a 3 day qualifying hospital stay waiver?  Yes     Which insurance plan 3 day waiver is available? Alternative insurance waiver    Will the waiver be used for post-acute placement? Undetermined at this time    PAS Confirmation Code: [id number]  Patient/family educated on Medicare website which has current facility and service quality ratings: yes    Education Provided on the Discharge Plan: Yes  Persons Notified of Discharge Plans: pt, bedside RN, charge RN, [facility] attending, [facility] admissions  Patient/Family in Agreement with the Plan: yes    Handoff Referral Completed: Yes, [facility] PCP: Internal handoff referral completed    ___________________    IVANIA Lloyd, LGSW  6C , covering beds 6401 to 6519  [facility]   Phone: [phone]  6C Cards MIT[facility] [place]

## 2024-11-16 NOTE — PLAN OF CARE
Shift: 9015-3656  /53 (BP Location: Right arm)   Pulse 71   Temp 98  F (36.7  C) (Oral)   Resp 16   Wt 112.1 kg (247 lb 1.6 oz)   SpO2 95%   BMI 35.46 kg/m    Neuro: A&Ox4. Calls appropriately.   Cardiac: SR. VSS.  Respiratory: Sating >90% on RA.  GI/: Voiding w/out difficulty. BM X1  Diet/appetite: Tolerating regular diet.   Activity:  Assist of 1 w/ gb and walker, up to chair and in halls.  Pain: Reports consistent pain 6/10, declines medical intervention  Skin: No new deficits noted.  LDA's: Port hep locked.    Plan: Continue with POC. Notify primary team with changes.  Goal Outcome Evaluation:  Plan of Care Reviewed With: patient  Overall Patient Progress: no change  Outcome Evaluation: Awaiting TCU placement. Had paracentesis today. No other acute changes on shift.

## 2024-11-16 NOTE — PLAN OF CARE
Patient discharged to TCU following hospital stay for: Heart failure exacerbation     Vitals: /55   Pulse 62   Temp 98.1  F (36.7  C) (Oral)   Resp 15   Wt 112.1 kg (247 lb 1.6 oz)   SpO2 98%   BMI 35.46 kg/m    Oxygen status: room air  Patient tolerating diet: regular  Transport: pt left via wheelchair with EMS transportation     Belongings sent with patient. R port a cath heparin locked and deaccessed. Pt's home meds picked up from central pharmacy and transferring with patient. RN report given to TCU RN.    Goal Outcome Evaluation:  Plan of Care Reviewed With: patient  Overall Patient Progress: improving  Outcome Evaluation: Transferring to TCU. No changes on shift.

## 2024-11-16 NOTE — DISCHARGE SUMMARY
[facility]  Hospitalist Discharge Summary      Date of Admission:  10/29/2024  Date of Discharge:  11/16/2024  Discharging Provider: Saumya Torres DO  Discharge Service: Hospitalist Service, GOLD TEAM 7    Discharge Diagnoses   SBP  HCM with EDGAR and LVOTO graduient  HFpEF  RENNY  Metastatic prostate cancer     Follow-ups Needed After Discharge   He will need an appointment scheduled with Dr. Renteria for ongoing cirrhosis management with SBP (Hepatology)   He will need an appointment with his PCP  He will need an appointment with Dr. Plunkett (oncology)   He will need an appointment with Dr. Gonsales (cardiology)     Unresulted Labs Ordered in the Past 30 Days of this Admission       Date and Time Order Name Status Description    11/15/2024  7:40 AM Ascites Fluid Aerobic Bacterial Culture Routine With Gram Stain Preliminary     11/14/2024 11:00 PM Testosterone total In process         These results will be followed up by U hospitalist.     Discharge Disposition   Transferred to [facility]   Condition at discharge: Stable    Hospital Course     Gucci Logan is a 74-year-old male with history of HTN, metastatic prostate cancer, cirrhosis secondary to chronic HCV infection who presented for a heart failure exacerbation.  He was sent in from [facility] where he was found to be grossly hypervolemic.  He was started on diuretics and admitted initially to the cardiology service.    HCM with EDGAR with Valsalva LVOTO Gradient  Acute-on-Chronic HFpEF  HTN  Longstanding hx of HCM, recently established care w/ [facility] for HF. Has been struggling w/ worsening BLE edema, increased abdominal distension (weeping ascites), from poorly controlled hypervolemia. PTA diuretic regimen w/ Spironolactone was deemed inadequate, and given his difficulties w/ mobility at home and need for frequent appts/labs w/ titration of OP diuresis, decision was made to directly admit him instead  for more aggressive titration of diuretic regimen w/ close cardiac monitoring. Started on Bumex drip here by Cards. Repeat ECHO 10/30/24 showing hyperkinetic w/ EF 65-70%, dynamic outflow tract obstruction, peak gradient 119mmHg at rest, grade II moderate diastolic dysfunction - on most recent echo 11/8 this gradient was lower (80) indicative of probable over-estimation on prior. Transferred to Medicine service 10/31 given a diagnosis of SBP from ascites.  He was aggressively diuresed at the beginning of his hospitalization but subsequent attempts at diuresis resulted in an acute kidney injury.  Cardiology would ideally like him on a a loop diuretic maintenance doing and valsartan, but it has been difficult to resume diuresis, each time causing RENNY and Bps have been borderline limiting antihypertensives. His prior to admission valsartan was held during admission due to low blood pressures and dizziness.  His metoprolol was reduced early in his admission and resumed at home dose on 11/9.  His shortness of breath improved after restarting his metoprolol.  Going forward he will require daily standing weights to ensure he is not becoming grossly volume overloaded.    Shortness of Breath with Exertion  During his admission he had a significant amount of shortness of breath.  He underwent IVC Q scan on 11/11/2024 which was negative.  His shortness of breath resolved once his metoprolol was uptitrated to his home dose of 200 mg daily.  He had an echocardiogram on 11/11/2024 which still did show a residual gradient, but at he improved with blood transfusions and resolution of his metoprolol. He will require cardiology follow up as above. During his admission he also had anemia which likely contributed to some of his shortness of breath.  He did require transfusions while in the hospital for anemia less than 7.    Spontaneous Bacterial Peritonitis  Cirrhosis 2/2 HCV, Newly Decompensated, c/b ascites, SBP  Canelo has a history  of cirrhosis secondary to HCV.  Prior to admission his cirrhosis had been largely compensated but since beginning chemotherapy for metastatic prostate cancer he developed new onset ascites.  This was also in the setting of volume overload from hypertrophic cardiomyopathy as above.  On 10/30/2024 he underwent a paracentesis for ascites which had fluid studies that were consistent with SBP.  He was started on ceftriaxone and given albumin.  He underwent a second paracentesis on 11/2/24 which showed a slow improvement in his cell count and he was switched to pip-tazo for this reason.  He completed a course of pip-tazo and was switched to ciprofloxacin prophylaxis going forward.  He will need ongoing therapeutic paracentesis for his ascites when his abdomen becomes distended.  His last paracentesis was on 11/15/2024 with removal of 2 L fluid.  Some of the fluid studies are still pending on discharge to TCU, but the PMNs were noted to be 61.     RENNY, suspect prerenal, improved but stalled out Cr around 1.5-1.6  CKD Stage III  Each time diuretic has been ordered he has had a significant increase in his creatinine.   I suspect Canelo' acute kidney injury has been caused by being intravascularly deplete with additional bumetanide being given.  Each time bumetanide was added on 11/8 a 11/14, creatinine increased.  With holding diuresis his creatinine trended towards normal.  Would consider starting him on maintenance dosing of diuretic once his creatinine has improved at TCU.    Anemia of chronic disease, + possible small volume intraperitoneal bleed following paracentesis  BRBPR 11/7  During admission he had a likely hemorrhoidal bleed given presence of internal hemorrhoids on rectal exam 11/8; hgb checked soon after was stable and vitals have shown consistent stability. He did have a drop in his hemoglobin on 11/11. GI subsequently preformed EGD on 11/11/24which showed grade I varices with no bleeding and no stigmata of  recent bleeding were found in the lower third of the esophagus. They were small in size. Mild portal hypertensive gastropathy was found in the gastric fundus and in the gastric body.  He required no further blood transfusions and it was suspected this drop in hemoglobin was caused from a small intraperitoneal bleed from recent paracentesis. Cardiology would like hemoglobin >8 if lightheaded and this should be considered at TCU.        Metastatic Prostate Cancer  Chronic Pancytopenia   Initially diagnosed earlier this year incidentally following a fall at home, fracturing his L femur. ORIF 05/24/24 and curretage samples c/w adenocarcinoma of prostate origin. Has since followed w/ Oncology as an OP, last saw 10/24/24. PTA on Daralutamide BID, Docetaxel (C4D1 was on 10/24/24) and Leuprolide [id number] (last 9/5/24). Also gets Neulasta, last 10/25. Has chronic pancytopenia, likely d/t multiple comorbidities (cirrhosis, CKD) and iatrogenic (chemotherapy). Baseline hgb ~8-9 recently. Oncology consulted here 10/31 and recommended to continue Daralutamide. Oncology will need to follow up with him after discharge from TCU to discuss next steps. He was continued on PTA prednisone 5 mg.      Type 2 Diabetes Mellitus, non-insulin-dependent, well-controlled  Last A1c 5.8% on 10/29/24. PTA Metformin. BG checks past 24 hours are all within goal (<180). Sliding scale can be continued at TCU.     Consultations This Hospital Stay   CARDIAC REHAB IP CONSULT  WOUND OSTOMY CONTINENCE NURSE  IP CONSULT  ONCOLOGY ADULT IP CONSULT  INTERNAL MEDICINE PROCEDURE TEAM ADULT IP CONSULT [facility] - PARACENTESIS  CARE MANAGEMENT / SOCIAL WORK IP CONSULT  NURSING TO CONSULT FOR VASCULAR ACCESS CARE IP CONSULT  CARE MANAGEMENT / SOCIAL WORK IP CONSULT  LYMPHEDEMA THERAPY IP CONSULT  GI HEPATOLOGY ADULT IP CONSULT  INTERNAL MEDICINE PROCEDURE TEAM ADULT IP CONSULT [facility] - PARACENTESIS  PHYSICAL THERAPY ADULT IP CONSULT  OCCUPATIONAL THERAPY ADULT  IP CONSULT  INTERNAL MEDICINE PROCEDURE TEAM ADULT IP CONSULT [facility] - [facility]  NURSING TO CONSULT FOR VASCULAR ACCESS CARE IP CONSULT  PHYSICAL MEDICINE & REHAB ASSESSMENT FOR REHAB PLACEMENT ADULT IP CONSULT  TRANSPLANT SURGERY LIVER ADULT IP CONSULT  INTERNAL MEDICINE PROCEDURE TEAM ADULT IP CONSULT [facility] - PARACENTESIS  INFECTIOUS DISEASE GENERAL ADULT IP CONSULT  CARDIOLOGY HEART FAILURE (HF) IP CONSULT  INTERNAL MEDICINE PROCEDURE TEAM ADULT IP CONSULT [facility] - PARAC[facility]  NURSING TO CONSULT FOR VASCULAR ACCESS CARE IP CONSULT  ONCOLOGY ADULT IP CONSULT  INTERNAL MEDICINE PROCEDURE TEAM ADULT IP CONSULT [facility] - PARACENTES[facility]  SMOKING CESSATION PROGRAM IP CONSULT    Code Status   Full Code    Time Spent on this Encounter   I, Saumya Torres DO, personally saw the patient today and spent greater than 30 minutes discharging this patient.       Saumya Torres DO  [facility] [address] [address]  Phone: [phone]  ______________________________________________________________________    Physical Exam   Vital Signs: Temp: 98.1  F (36.7  C) Temp src: Oral BP: 111/58 Pulse: 69   Resp: 17 SpO2: 98 % O2 Device: None (Room air)    Weight: 247 lbs 1.6 oz  General Appearance: Appears overweight, no acute distress, speaking in full sentences and fully alert and oriented   Respiratory: Normal RR, no acute distress  Cardiovascular: regular rate and rhythm III/VI heart murmur   GI: soft, paracentesis site without bruising, non-tender   Skin: lymphedema wraps in place in lower extremities        Primary Care Physician   Richi Jaramillo    Discharge Orders      Paracentesis       Significant Results and Procedures   Most Recent 3 CBC's:  Recent Labs   Lab Test 11/16/24  0343 11/15/24  1702 11/15/24  0422   WBC 3.6* 3.7* 4.6   HGB 7.9* 8.3* 8.4*   * 105* 105*   PLT 51* 51* 55*     Most Recent 3 BMP's:  Recent Labs   Lab Test 11/16/24  1148 11/16/24  0902  11/16/24  0800 11/16/24  0343 11/15/24  0809 11/15/24  0422 11/14/24  0857 11/14/24  0535   NA  --   --   --  142  --  143  --  139   POTASSIUM  --   --   --  4.0  --  4.2  --  4.1   CHLORIDE  --   --   --  110*  --  110*  --  108*   CO2  --   --   --  25  --  24  --  24   BUN  --   --   --  43.6*  --  45.9*  --  45.0*   CR  --   --   --  1.51*  --  1.62*  --  1.28*   ANIONGAP  --   --   --  7  --  9  --  7   SOLEDAD  --   --   --  7.8*  --  7.9*  --  8.2*   * 109* 118* 126*   < > 172*   < > 139*    < > = values in this interval not displayed.     Most Recent 2 LFT's:  Recent Labs   Lab Test 11/16/24  0343 11/15/24  0422   AST 55* 69*   ALT 37 44   ALKPHOS 221* 248*   BILITOTAL 0.6 0.5     Most Recent 3 INR's:  Recent Labs   Lab Test 08/19/24  1714 05/24/24  0723 05/22/24  0559   INR 1.32* 1.47* 1.64*     Most Recent TSH and T4:  Recent Labs   Lab Test 07/03/24  1511   TSH 3.22   T4 1.35     Most Recent Hemoglobin A1c:  Recent Labs   Lab Test 10/29/24  1558   A1C 5.8*     Most Recent ESR & CRP:  Recent Labs   Lab Test 11/08/24  0641 04/16/24  1349 08/15/22  1003 08/15/22  1003   SED  --  25*  --  21*   CRP  --   --   --  7.9   CRPI 16.00* 7.88*   < >  --     < > = values in this interval not displayed.   ,   Results for orders placed or performed during the hospital encounter of 10/29/24   POC US GUIDE FOR PARACENTESIS    Impression    US Indication: abdominal distension    Limited abdominal ultrasound was performed and demonstrated an adequate fluid collection on the left side of the abdomen.     Doppler of the skin demonstrated an area at this site without significant vasculature.  A paracentesis at this site was subsequently performed.    Archana Fitzpatrick MD    POC US Guide for Paracentesis    Impression    US Indication: decompensated liver disease    Limited abdominal ultrasound was performed and demonstrated an adequate fluid collection on the left side of the abdomen.     Doppler of the skin demonstrated  an area at this site without significant vasculature.  A paracentesis at this site was subsequently performed.    Mata Koo MD     POC US GUIDE FOR PARACENTESIS    Impression    US Indication: abdominal distension    Limited abdominal ultrasound was performed and demonstrated an adequate fluid collection on the left side of the abdomen.     Doppler of the skin demonstrated an area at this site without significant vasculature.  A paracentesis at this site was subsequently performed.    Zahida Nickerson DO     XR Chest Port 1 View    Narrative    Exam: XR CHEST PORT 1 VIEW, 11/8/2024 8:08 AM    Comparison: Chest radiograph 9/5/2024; CT PET 10/18/2024    History: shortness of breath    Findings:  Frontal recumbent view of the chest. Right-sided chest port with tip  in the atriocaval junction, stable. Trachea midline. Stable borderline  enlarged cardiac silhouette. Sharp costophrenic angles. No focal  airspace opacity. No pneumothorax. Similar appearance of perihilar  predominant interstitial opacities. Incompletely visualized  thoracolumbar fusion. The visualized abdomen is unremarkable.      Impression    Impression:   Perihilar predominant interstitial opacities, favored to represent  atelectatic changes. No focal airspace opacity is identified.    I have personally reviewed the examination and initial interpretation  and I agree with the findings.    JOSTIN WALLACE MD         SYSTEM ID:  [id number]   POC US GUIDE FOR PARACENTESIS    Impression    US Indication: abdominal distension and abdominal pain    Limited abdominal ultrasound was performed and demonstrated an adequate fluid collection on the left side of the abdomen.     Doppler of the skin demonstrated an area at this site without significant vasculature.  A paracentesis at this site was subsequently performed.    Zahida Nickerson DO     NM Lung Scan Ventilation and Perfusion    Narrative    Examination:NM LUNG SCAN VENTILATION AND PERFUSION,  11/11/2024 2:43 PM      Indication:  exertional dyspnea w multiple risk factors for PE  (cancer, immobility, obesity, cirrhosis, age)     Additional Information: none    D-Dimer: None    Technique:    The patient received 2 mCi of Tc-99m DTPA aerosol for ventilation and  6.9 mCi of Tc-99m MAA for perfusion. Multiple images were obtained of  the lungs in Anterior, posterior, WELLS, RPO, LPO, and  KENDRA projections.    Comparison: Chest x-ray from 11/8/2024    Findings:    The ventilation study demonstrates normal ventilation, with some  central airway deposition.    The perfusion study demonstrates perfusion of all ventilated areas.      Impression    Impression:    1. Pulmonary emboli is not present.    I have personally reviewed the examination and initial interpretation  and I agree with the findings.    GREG LICEA MD         SYSTEM ID:  [id number]   XR Chest 2 Views    Narrative    Exam: XR CHEST 2 VIEWS, 11/11/2024 2:48 PM    Indication: exertional dyspnea with multiple risk factors    Comparison: Chest x-ray 11/8/2024; 9/5/2024    Findings:   Frontal and lateral view x-ray of the chest. Right IJ Port-A-Cath tip  projecting over the expected location of superior cavoatrial junction.  No pneumothorax. No effusion. Stable cardiac mediastinal  silhouette.Mild bibasilar streaky opacities.      Impression    Impression: Mild bibasilar streaky opacities, nonspecific, possibly  atelectatic/edema.    MONSERRAT MCCORMICK MD         SYSTEM ID:  [id number]   POC US GUIDE FOR PARACENTESIS    Impression    US Indication: abdominal distension    Limited abdominal ultrasound was performed and demonstrated an adequate fluid collection on the left side of the abdomen.     Doppler of the skin demonstrated an area at this site without significant vasculature.  A paracentesis at this site was subsequently performed.    Mata Koo MD     Echo Complete     Value    LVEF  65-70%    Narrative     [id number]  [id number]  [id number]  [id number]^CHRISTOPHE^SHIN^LAVELL     [facility],[place]  Echocardiography Laboratory  [address] [place] [id number]     Name: LAURENT PEREIRA  MRN: [id number]  : 1950  Study Date: 10/30/2024 09:26 AM  Age: 74 yrs  Gender: Male  [place] [facility] [place] For Study: Dilated, Restrictive or HCM  Ordering Physician: SHIN SAEED  Referring Physician: GARETH TINSLEY  Performed By: Keo Pompa     BSA: 2.4 m2  Height: 70 in  Weight: 267 lb  BP: 94/46 mmHg  ______________________________________________________________________________  Procedure  Echocardiogram with two-dimensional, color and spectral Doppler performed.  ______________________________________________________________________________  Interpretation Summary  Global and regional left ventricular function is hyperkinetic with an EF of  65-70%.  The basal anteroseptal segment is 2.7 cm.  Dynamic outflow tract obstruction is present due to systolic anterior motion  of the mitral valve.The peak gradient is 119mmHg at rest.  Grade II or moderate diastolic dysfunction.  Global right ventricular function is normal.  The inferior vena cava was normal in size with preserved respiratory  variability.  Compared to the prior study on 2024, the LVOT gradient is significantly  higher.  ______________________________________________________________________________  Left Ventricle  Global and regional left ventricular function is hyperkinetic with an EF of  65-70%. The pattern of wall thickening is consistent with hypertrophic  cardiomyopathy. The basal anteroseptal segment is 2.7 cm in diameter. Dynamic  outflow tract obstruction is present due to systolic anterior motion of the  mitral valve.The peak gradient is 119mmHg. Grade II or moderate diastolic  dysfunction. No regional wall motion abnormalities are seen.     Right Ventricle  The right ventricle is normal size.  Global right ventricular function is  normal.     Atria  The right atria appears normal. Mild left atrial enlargement is present. The  atrial septum is intact as assessed by color Doppler .     Mitral Valve  Mild mitral annular calcification is present. Mild mitral insufficiency is  present.     Aortic Valve  The aortic valve is tricuspid. Aortic valve sclerosis is present. Trace aortic  insufficiency is present.     Tricuspid Valve  Trace tricuspid insufficiency is present. The peak velocity of the tricuspid  regurgitant jet is not obtainable.     Pulmonic Valve  The valve leaflets are not well visualized. Pulmonary Valve Doppler indicates  elevated pulmonary vascular resistance.     Vessels  The aorta root is normal. The thoracic aorta is normal. The inferior vena cava  was normal in size with preserved respiratory variability.     Pericardium  No pericardial effusion is present.     Compared to Previous Study  Compared to the prior study on 8/20/2024, the LVOT gradient is significantly  higher.     Attestation  I have personally viewed the imaging and agree with the interpretation and  report as documented by the fellow, Qamar Hernandez, and/or edited by me.  ______________________________________________________________________________  MMode/2D Measurements & Calculations  IVSd: 2.4 cm  LVIDd: 4.6 cm  LVIDs: 2.6 cm  LVPWd: 1.3 cm  FS: 43.3 %  LV mass(C)d: 400.8 grams  LV mass(C)dI: 169.8 grams/m2  asc Aorta Diam: 3.8 cm  LVOT diam: 2.3 cm  LVOT area: 4.1 cm2  Asc Ao diam index BSA (cm/m2): 1.6  Asc Ao diam index Ht(cm/m): 2.2  LA Volume (BP): 90.4 ml     LA Volume Index (BP): 38.3 ml/m2  RWT: 0.56     Doppler Measurements & Calculations  MV E max dudley: 93.2 cm/sec  MV A max dudley: 116.1 cm/sec  MV E/A: 0.80  MV dec slope: 267.5 cm/sec2  MV dec time: 0.35 sec  E/E' avg: 15.7  Lateral E/e': 13.2  Medial E/e': 18.2  RV S Dudley: 22.4 cm/sec      ______________________________________________________________________________  Report approved by: Madhuri Blevins 10/30/2024 11:04 AM         Echo Complete     Value    LVEF  65-70%    Narrative    [id number]  [id number]  [id number]  [id number]^MARIA ESTHER^JEANCARLOS^EWELINA     [facility],[place]  Echocardiography Laboratory  [address] [place] [address]     Name: LAURENT PEREIRA  MRN: [id number]  : 1950  Study Date: 2024 01:29 PM  Age: 74 yrs  Gender: Male  Patient Location: [facility] [place]  Reason For Study: Dilated, Restrictive or HCM, Other, Please Specify in  Comments  Ordering Physician: JEANCARLOS MAYO  Referring Physician: GARETH TINSLEY  Performed By: Edilberto Vanegas RDCS     BSA: 2.3 m2  Height: 71 in  Weight: 239 lb  HR: 75  BP: 102/44 mmHg  ______________________________________________________________________________  Procedure  Complete Portable Echo Adult.  ______________________________________________________________________________  Interpretation Summary  Global and regional left ventricular function is hyperkinetic with an EF of  65-70%.     Hypertrophic cardiomyoapthy with reverse septal curvature. The basal  anteroseptal segment is 2.5 cm. There is mid ventricular obstruction with a  mid-cavity LVOT gradient of ~81 mmHg based a MR peak velocity of 6.5 m/s and   mmHg.     Global right ventricular function is normal.     The inferior vena cava was normal in size with preserved respiratory  variability.     Trivial pericardial effusion is present.  ______________________________________________________________________________  Left Ventricle  Global and regional left ventricular function is hyperkinetic with an EF of  65-70%. Hypertrophic cardiomyoapthy with reverse septal curvature. The basal  anteroseptal segment is 2.5 cm. There is mid ventricular obstruction with a  mid-cavity LVOT gradient of ~81 mmHg based a MR peak velocity of 6.5 m/s and   mmHg.  Left ventricular diastolic function is normal.     Right Ventricle  The right ventricle is normal size. Global right ventricular function is  normal.     Atria  The right atria appears normal. Mild to moderate left atrial enlargement is  present.     Mitral Valve  Mild mitral annular calcification is present. Mild mitral insufficiency is  present.     Aortic Valve  On Doppler interrogation, there is no significant stenosis or regurgitation.  The aortic valve is tricuspid.     Tricuspid Valve  The tricuspid valve is normal. The peak velocity of the tricuspid regurgitant  jet is not obtainable. Pulmonary artery systolic pressure cannot be assessed.     Vessels  The aorta root is normal. The inferior vena cava was normal in size with  preserved respiratory variability.     Pericardium  Trivial pericardial effusion is present.     Compared to Previous Study  This study was compared with the study from 10.30.24 and 24 . LVOT  gradient appears to have been overstimated on the study from 10/30/24. LVOT  gradient appears higher than from the study on 24.     ______________________________________________________________________________  Report approved by: Madhuri Cope 2024 03:16 PM     ______________________________________________________________________________      Echo Limited     Value    LVEF  >70%    [facility]    [id number]  [id number]  [id number]  [id number]^LORI^NARDA^ALBA     [facility],[place]  Echocardiography Laboratory  [address] [place] [id number]     Name: LAURENT PEREIRA  MRN: [id number]  : 1950  Study Date: 2024 11:24 AM  Age: 74 yrs  Gender: Male  [place] [facility] [place]  Reason For Study: Dilated, Restrictive or HCM  Ordering Physician: NARDA SANDOVAL  Referring Physician: GARETH TINSLEY  Performed By: Keo Pompa     BP: 119/54  mmHg  ______________________________________________________________________________  ______________________________________________________________________________  Interpretation Summary  Basal septun 26mm. EDGAR with LVOT gradient of 157mmHg.  ______________________________________________________________________________  Left Ventricle  Basal septun 26mm. EDGAR with LVOT gradient of 157mmHg. The pattern of wall  thickening is consistent with hypertrophic cardiomyopathy. Hypertrophic  cardiomyoapthy with reverse septal curvature. The basal anteroseptal segment  is 2.6 cm. There is ventricular obstruction with a gradient secondary to  systolic anterior motion of the mitral valve.The peak gradient is 157mmHg,  peak velocity of 6.27 m/s. The visual ejection fraction is >70%.     Mitral Valve  Trace mitral insufficiency is present.     Vessels  The inferior vena cava is normal. IVC diameter <2.1 cm collapsing >50% with  sniff suggests a normal RA pressure of 3 mmHg.     Attestation  I have personally viewed the imaging and agree with the interpretation and  report as documented by the fellow, Gus Coon, and/or edited by  me.     ______________________________________________________________________________  Report approved by: Madhuri Blevins 11/11/2024 01:51 PM     ______________________________________________________________________________        *Note: Due to a large number of results and/or encounters for the requested time period, some results have not been displayed. A complete set of results can be found in Results Review.       Discharge Medications     Current Facility-Administered Medications:     acetaminophen (TYLENOL) Suppository 650 mg, 650 mg, Rectal, Q4H PRN, Lexi Crespo APRN CNP    acetaminophen (TYLENOL) tablet 650 mg, 650 mg, Oral, Q4H PRN, Lexi Crespo APRN CNP, 650 mg at 11/16/24 0323    alteplase (CATHFLO ACTIVASE) injection 1-2 mg, 1-2 mg, Intravenous, Q2H PRN,  Cruz Blackwell MD, 2 mg at 11/08/24 0544    alum & mag hydroxide-simethicone (MAALOX) suspension 30 mL, 30 mL, Oral, Q4H PRN, Lexi Crespo APRN CNP    aspirin (ASA) chewable tablet 81 mg, 81 mg, Oral, Daily, Lexi Crespo APRN CNP, 81 mg at 11/16/24 0809    [Held by provider] bumetanide (BUMEX) tablet 2 mg, 2 mg, Oral, Daily, Saumya Torres, DO, 2 mg at 11/14/24 0802    calcium carbonate (TUMS) chewable tablet 500 mg, 500 mg, Oral, Daily PRN, Louis Hanson PA-C, 500 mg at 11/05/24 2215    calcium citrate (CITRACAL) tablet 950 mg, 950 mg, Oral, Daily, Lexi Crespo APRN CNP, 950 mg at 11/16/24 1040    ciprofloxacin (CIPRO) tablet 500 mg, 500 mg, Oral, Q24H VINCENT, Fracisco Wilkins, , 500 mg at 11/16/24 0809    darolutamide (NUBEQA) tablet 600 mg, 600 mg, Oral, BID, Lexi Crespo APRN CNP, 600 mg at 11/16/24 0810    glucose gel 15-30 g, 15-30 g, Oral, Q15 Min PRN **OR** dextrose 50 % injection 25-50 mL, 25-50 mL, Intravenous, Q15 Min PRN **OR** glucagon injection 1 mg, 1 mg, Subcutaneous, Q15 Min PRN, Lexi Crespo APRN CNP    diclofenac (VOLTAREN) 1 % topical gel 4 g, 4 g, Topical, TID PRN, Lexi Crespo APRN CNP    famotidine (PEPCID) tablet 20 mg, 20 mg, Oral, BID PRN, Fluevelyn, Saumya, DO    heparin lock flush 10 unit/mL injection 5-10 mL, 5-10 mL, Intracatheter, Q1H PRN, Lexi Crespo APRN CNP, 5 mL at 11/15/24 1701    heparin lock flush 10 unit/mL injection 5-10 mL, 5-10 mL, Intracatheter, Q24H, Lexi Crespo APRN CNP, 5 mL at 11/15/24 0430    heparin lock flush 100 unit/mL injection 5-10 mL, 5-10 mL, Intracatheter, Q28 Days, Lexi Crespo APRN CNP    insulin aspart (NovoLOG) injection (RAPID ACTING), 1-7 Units, Subcutaneous, TID AC, Lexi Crespo APRN CNP, 1 Units at 11/15/24 1840    insulin aspart (NovoLOG) injection (RAPID ACTING), 1-5 Units, Subcutaneous, At Bedtime, Lexi Crespo APRN CNP, 1 Units at 11/13/24  2143    lidocaine (LMX4) cream, , Topical, Q1H PRN, Lexi Crespo APRN CNP    lidocaine 1 % 0.1-1 mL, 0.1-1 mL, Other, Q1H PRN, Lexi Crespo APRN CNP    medication instruction, , Does not apply, Continuous PRN, Lexi Crespo APRN CNP    methocarbamol (ROBAXIN) tablet 500 mg, 500 mg, Oral, Q6H PRN, Lexi Crespo APRN CNP, 500 mg at 11/02/24 1832    metoprolol succinate ER (TOPROL XL) 24 hr tablet 200 mg, 200 mg, Oral, Daily, Fracisco Wilkins DO, 200 mg at 11/16/24 0810    naloxone (NARCAN) injection 0.2 mg, 0.2 mg, Intravenous, Q2 Min PRN **OR** naloxone (NARCAN) injection 0.4 mg, 0.4 mg, Intravenous, Q2 Min PRN **OR** naloxone (NARCAN) injection 0.2 mg, 0.2 mg, Intramuscular, Q2 Min PRN **OR** naloxone (NARCAN) injection 0.4 mg, 0.4 mg, Intramuscular, Q2 Min PRN, Tigist Yepez MD    nitroGLYcerin (NITROSTAT) sublingual tablet 0.4 mg, 0.4 mg, Sublingual, Q5 Min PRN, Lexi Crespo APRN CNP    oxyCODONE (ROXICODONE) tablet 5-10 mg, 5-10 mg, Oral, Q6H PRN, Lexi Crespo APRN CNP, 10 mg at 11/16/24 0323    pantoprazole (PROTONIX) EC tablet 40 mg, 40 mg, Oral, QAM AC, Tigist Yepez MD, 40 mg at 11/16/24 0809    polyethylene glycol (MIRALAX) Packet 17 g, 17 g, Oral, BID, Jordan Grimes DO, 17 g at 11/10/24 0826    predniSONE (DELTASONE) tablet 5 mg, 5 mg, Oral, Daily, Lexi Crespo APRN CNP, 5 mg at 11/16/24 0809    prochlorperazine (COMPAZINE) tablet 5 mg, 5 mg, Oral, Q6H PRN, Lexi Crespo APRN CNP    senna-docusate (SENOKOT-S/PERICOLACE) 8.6-50 MG per tablet 1 tablet, 1 tablet, Oral, BID PRN **OR** senna-docusate (SENOKOT-S/PERICOLACE) 8.6-50 MG per tablet 2 tablet, 2 tablet, Oral, BID PRN, Lexi Crespo APRN CNP    sodium chloride (PF) 0.9% PF flush 10-20 mL, 10-20 mL, Intracatheter, q1 min prn, Lexi Crespo APRN CNP, 10 mL at 11/03/24 1714    sodium chloride (PF) 0.9% PF flush 10-20 mL, 10-20 mL, Intracatheter, Q1H PRN, Cherie,  DIANE Addison CNP, 20 mL at 11/15/24 1701    sodium chloride (PF) 0.9% PF flush 10-20 mL, 10-20 mL, Intracatheter, Q28 Days, Lexi Crespo APRN CNP, 20 mL at 10/29/24 1714    sodium chloride (PF) 0.9% PF flush 3 mL, 3 mL, Intracatheter, Q8H, Lexi Crespo APRN CNP, 3 mL at 11/14/24 1645    sodium chloride (PF) 0.9% PF flush 3 mL, 3 mL, Intracatheter, q1 min prn, Lexi Crespo APRN CNP    [Held by provider] spironolactone (ALDACTONE) tablet 50 mg, 50 mg, Oral, Daily, Lexi Crespo APRN CNP, 50 mg at 10/31/24 0913    [Held by provider] valsartan (DIOVAN) tablet 160 mg, 160 mg, Oral, Daily, Lexi Crespo APRN CNP, 160 mg at 10/30/24 0816    Allergies   Allergies   Allergen Reactions    Bee Venom Swelling     Gum swelling    Haemophilus B Polysaccharide Vaccine Other (See Comments)     PN: delirium  fever    Haemophilus Influenzae Nausea and Other (See Comments)     PN: delirium  fever    Other Reaction(s): Fever    PN: delirium  fever   PN: delirium  fever    Pneumococcal Vaccine      Other Reaction(s): *Unknown, adverse reaction

## 2024-11-17 ENCOUNTER — APPOINTMENT (OUTPATIENT)
Dept: PHYSICAL THERAPY | Facility: SKILLED NURSING FACILITY | Age: 74
DRG: 314 | End: 2024-11-17
Attending: INTERNAL MEDICINE
Payer: COMMERCIAL

## 2024-11-17 LAB
GLUCOSE BLDC GLUCOMTR-MCNC: 115 MG/DL (ref 70–99)
GLUCOSE BLDC GLUCOMTR-MCNC: 149 MG/DL (ref 70–99)
GLUCOSE BLDC GLUCOMTR-MCNC: 155 MG/DL (ref 70–99)
GLUCOSE BLDC GLUCOMTR-MCNC: 162 MG/DL (ref 70–99)
GLUCOSE BLDC GLUCOMTR-MCNC: 190 MG/DL (ref 70–99)
TESTOST SERPL-MCNC: 5 NG/DL (ref 240–950)

## 2024-11-17 PROCEDURE — 250N000013 HC RX MED GY IP 250 OP 250 PS 637: Performed by: STUDENT IN AN ORGANIZED HEALTH CARE EDUCATION/TRAINING PROGRAM

## 2024-11-17 PROCEDURE — 99306 1ST NF CARE HIGH MDM 50: CPT | Performed by: INTERNAL MEDICINE

## 2024-11-17 PROCEDURE — 120N000009 HC R&B SNF

## 2024-11-17 PROCEDURE — 97110 THERAPEUTIC EXERCISES: CPT | Mod: GP

## 2024-11-17 PROCEDURE — 97530 THERAPEUTIC ACTIVITIES: CPT | Mod: GP

## 2024-11-17 PROCEDURE — 250N000012 HC RX MED GY IP 250 OP 636 PS 637: Performed by: STUDENT IN AN ORGANIZED HEALTH CARE EDUCATION/TRAINING PROGRAM

## 2024-11-17 PROCEDURE — 97162 PT EVAL MOD COMPLEX 30 MIN: CPT | Mod: GP

## 2024-11-17 RX ADMIN — Medication 950 MG: at 09:14

## 2024-11-17 RX ADMIN — OXYCODONE HYDROCHLORIDE 10 MG: 5 TABLET ORAL at 20:01

## 2024-11-17 RX ADMIN — INSULIN ASPART 1 UNITS: 100 INJECTION, SOLUTION INTRAVENOUS; SUBCUTANEOUS at 18:50

## 2024-11-17 RX ADMIN — ACETAMINOPHEN 650 MG: 325 TABLET, FILM COATED ORAL at 20:00

## 2024-11-17 RX ADMIN — INSULIN ASPART 1 UNITS: 100 INJECTION, SOLUTION INTRAVENOUS; SUBCUTANEOUS at 14:54

## 2024-11-17 RX ADMIN — ASPIRIN 81 MG CHEWABLE TABLET 81 MG: 81 TABLET CHEWABLE at 09:15

## 2024-11-17 RX ADMIN — PREDNISONE 5 MG: 5 TABLET ORAL at 09:15

## 2024-11-17 RX ADMIN — CIPROFLOXACIN HYDROCHLORIDE 500 MG: 500 TABLET, FILM COATED ORAL at 09:15

## 2024-11-17 RX ADMIN — METOPROLOL SUCCINATE 200 MG: 200 TABLET, EXTENDED RELEASE ORAL at 09:15

## 2024-11-17 RX ADMIN — PANTOPRAZOLE SODIUM 40 MG: 40 TABLET, DELAYED RELEASE ORAL at 09:15

## 2024-11-17 NOTE — PLAN OF CARE
Goal Outcome Evaluation:         Patient is a 74 year old male  admitted to room 403 via wheelchair.  Patient is alert and oriented X 4. See Epic for VS and assessment.  Patient is able to transfer bed using walker. Patient was settled into their room, shown call light, tv, mealtimes etc. Oriented to unit. Will continue monitoring pain level and VS. Notifying MD with any concerns.  Follow MD orders for cares and medications.    Flu Vaccine:   - Yes, up to date (patient had vaccine this flu season)      COVID Vaccine:   - Yes, up to date (had vaccine this season)       Pneumococcal Vaccine:   - Yes, up to date       Ethnicity:   Race: White  Dentures: No, implants  Hearing Aid: No, not recently  Smoker:  No  Glasses: No, implants  Falls 0-1 mo: 0 2-6 mo: 1  Prior device use: Walker   Advanced Care Directive Referral to Social Work?No

## 2024-11-17 NOTE — PROGRESS NOTES
"   11/17/24 1300   Appointment Info   Signing Clinician's Name / Credentials (PT) Luzma Greene DPT   Rehab Comments (PT) PT: Eval complete, txt initiated   Quick Adds   Quick Adds Certification      Language English   Living Environment   People in Home alone   Current Living Arrangements condominium   Home Accessibility no concerns   Transportation Anticipated family or friend will provide   Living Environment Comments PT: Pt reports living in a condo in [place], [place] with no stairs to manage. Pt has been dealing with his health since May 2024 when he discovered his cancer, and has fallen at his home in [place], [place]. While undergoing treatment, pt has stayed at [facility] due to no stair requirement.   Self-Care   Usual Activity Tolerance moderate   Current Activity Tolerance fair   Regular Exercise No   Equipment Currently Used at Home commode chair;cane, straight;shower chair;walker, rolling   Fall history within last six months yes   Number of times patient has fallen within last six months 1   Activity/Exercise/Self-Care Comment PT: Pt is completely IND at baseline, works as a contractor and very self-sufficient with no device. Again, pt has been dealing with health since May 2024 and has used a variety of devices and strategies for safety but wants to return to driving and living up in [place],.   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  He was [facility] in [facility] where he was found to be grossly hypervolemic.  He was started on diuretics and " "admitted initially to the cardiology service.\"   Existing Precautions/Restrictions fall   Cognition   Affect/Mental Status (Cognition) WFL   Pain Assessment   Patient Currently in Pain Yes, see Vital Sign flowsheet  (Pt has ongoing pain in L hip and discomfort from fluid overload)   Integumentary/Edema   Integumentary/Edema Comments PT: Significant Pitting Edema, worse on L vs R. Pt using tubigrips.   Posture    Posture Forward head position   Range of Motion (ROM)   ROM Comment PT: Grossly WFL for B LE   Strength (Manual Muscle Testing)   Strength Comments PT: Grossly 3+/5 for B LE   Balance   Balance Comments PT: Pt can stand with FWW, 30 sec no sway   Sensory Examination   Sensory Perception Comments PT: Pt endorses neuropathy, intact B LE fine touch   Coordination   Coordination Comments PT: Not formally tested, grossly intact coordination   Muscle Tone   Muscle Tone no deficits were identified   Clinical Impression   Criteria for Skilled Therapeutic Intervention Yes, treatment indicated   PT Diagnosis (PT) PT: Decreased endurance, strength, and tolerance for all activity from reported baseline values   Influenced by the following impairments Medical Status   Functional limitations due to impairments Ambulation, transfers, bed mobility   Clinical Presentation (PT Evaluation Complexity) evolving   Clinical Presentation Rationale PMH, age, recent and prolonged hosptialization   Clinical Decision Making (Complexity) moderate complexity   Planned Therapy Interventions (PT) balance training;bed mobility training;cryotherapy;E-stim;gait training;groups;home exercise program;joint mobilization;lumbar stabilization;manual therapy techniques;motor coordination training;neuromuscular re-education;orthotic fitting/training;patient/family education;postural re-education;prosthetic fitting/training;ROM (range of motion);stair training;strengthening;stretching;swiss ball techniques;TENS;thermotherapy;transfer " training;wheelchair management/propulsion training;progressive activity/exercise;risk factor education;home program guidelines   Risk & Benefits of therapy have been explained evaluation/treatment results reviewed;care plan/treatment goals reviewed;risks/benefits reviewed;current/potential barriers reviewed;participants voiced agreement with care plan;participants included;patient   Clinical Impression Comments PT: Pt is a 75 y/o male who presents for PT evaluation following hospitalization related to CHF and hypervolumeia. PT evaluation revealed significant reduced endurance, strength, and safety wtih activity. Pt will benefit from skilled therapy in order to address these deficits and aide in safe discharge home   PT Total Evaluation Time   PT Eval, Moderate Complexity Minutes (90378) 15   Therapy Certification   Start of care date 11/17/24   Certification date from 11/17/24   Certification date to 12/16/24   Medical Diagnosis Heart Failure   Physical Therapy Goals   PT Frequency 5x/week   PT Predicted Duration/Target Date for Goal Attainment 11/28/24   PT Goals Bed Mobility;Transfers;Stairs;Gait;Aerobic Activity;PT Goal 2   PT: Bed Mobility Independent   PT: Transfers Modified independent;Assistive device   PT: Gait Modified independent;100 feet   PT: Stairs Modified independent;6 stairs   PT: Perform aerobic activity with stable cardiovascular response continuous activity;10 minutes;ambulation   PT: Goal 2 PT: Pt will become IND in room with FWW in order to safely progress functional ambulation prior to discharge   Interventions   Interventions Quick Adds Therapeutic Activity;Therapeutic Procedure   Therapeutic Procedure/Exercise   Ther. Procedure: strength, endurance, ROM, flexibillity Minutes (18624) 25   Treatment Detail/Skilled Intervention PT: Pt performed ambulation and stair trining as a form of endurance training. See GG for details. Pt performed NUStep bike for 6 continuous minutes on LVL:5 with good  "effort, UE/LE improved tolerance as compared to standing activities.   Therapeutic Activity   Therapeutic Activities: dynamic activities to improve functional performance Minutes (75523) 20   Treatment Detail/Skilled Intervention PT: Oriented to therapy unit and expectations, see GG for all funcitonal data.   PT Discharge Planning   PT Plan PT: Test pt on a 4WW from upstairs (large frame blue). Continue with amb for CV endurance, stair training, standing activities. Pt is motivated and wants to work but L hip is painful.   Physical Therapy Time and Intention   Timed Code Treatment Minutes 45   Total Session Time (sum of timed and untimed services) 60   Post Acute Settings Only   What unit is patient on? TCU   PT - Transitional Care Unit Time   Individual Time (minutes) - PT 60   Group Time (minutes) - PT 0   Concurrent Time (minutes) - PT 0   Co-Treatment Time (minutes) - PT 0   TCU Total Session Time (minutes) - PT 60   TCU Daily Total Session Time   PT TCU Daily Total Session Time 60   Rehab TCU Daily Total Session Time 60   Bed Mobility: Turning side to side/Roll Left and Right   Patient Performance Independent   Staff Performance No setup or physical help (No setup or physical help from staff)   Bed Mobility: Sit to lying   Patient Performance Partial/moderate assist \"includes weight bearing assist of trunk or limbs\"   Staff Performance One person spike (one person physical assist)   Describe Performance PT: Mod A for L LE lift   Bed Mobility: Lying to sitting on the side of bed   Patient Performance Supervision or verbal cues   Staff Performance Set up help only   Describe Performance PT; SBA with increased time to manage LE/UE   Transfers: Sit to Stand   Patient Performance Partial/moderate assist \"includes weight bearing assist of trunk or limbs\"   Staff Performance One person assist (one person physical assist)   Equipment Used Rolling walker   Describe Performance PT; CGA/Min A dependent on the height of " "the surface, increased pain in L thigh with up/down   Transfers: Chair/Bed transfers   Patient Performance Partial/moderate assist \"includes weight bearing assist of trunk or limbs\"   Staff Performance One person assist (one person physical assist)   Equipment Used Rolling walker   Describe Performance PT; CGA/Min A dependent on the height of the surface, increased pain in L thigh with up/down   Walk 10 Feet - Ability to walk once standing   Patient Performance Steadying Assist   Mobility device used rolling walker   Describe Performance PT: Pt is able to ambulate 30ft x 1, 110 ft with FWW, CGA/SBA with wc folllow. The first round pt is severely limited by SOB and fatigue, needs quick sit. The second time pt's function is much improved, with SOB much more controlled   Walk 10 Feet on uneven surfaces - Ability to walk on various surfaces.   Patient Performance Steadying Assist   Mobility device used rolling walker   Describe Performance PT: Pt is able to ambulate 30ft x 1, 110 ft with FWW, CGA/SBA with wc folllow. The first round pt is severely limited by SOB and fatigue, needs quick sit. The second time pt's function is much improved, with SOB much more controlled   Walk 50 Feet with Two Turns - Ability to walk at least 50 feet.   Patient Performance Steadying Assist   Mobility device used rolling walker   Describe Performance PT: Pt is able to ambulate 30ft x 1, 110 ft with FWW, CGA/SBA with wc folllow. The first round pt is severely limited by SOB and fatigue, needs quick sit. The second time pt's function is much improved, with SOB much more controlled   Walk 150 Feet - Ability to walk 150 feet   Reason Not Done Medical condition   Wheel 50 Feet - Ability to move wheelchair/scooter   Reason Not Done Activity not applicable   Wheel 150 Feet - Ability to move wheelchair/scooter   Reason Not Done Activity not applicable   1 Step (curb) - Ability to go up/down 1 step/curb.   Patient Performance Steadying Assist " "  Describe Performance PT: CGA for 3 stairs up/down with B handrails, heavy reliance on UE, very slow moving step to pattern,   4 Steps - Ability to go up/down steps.   Patient Performance Steadying Assist   Describe Performance PT: CGA for 3 stairs up/down with B handrails, heavy reliance on UE, very slow moving step to pattern,   12 Steps - Ability to go up/down steps.   Reason Not Done Medical condition   Car Transfer - Ability to transfer in/out of car or van.   Patient Performance Partial/moderate assist \"includes weight bearing assist of trunk or limbs\"   Describe Performance PT: mod A for Lift of L LE into/out of car given weakness and fluid overload   Picking up Object - Ability to bend/stoop while standing.   Reason Not Done Safety concerns   Psychosocial Support   Trust Relationship/Rapport care explained;questions answered;thoughts/feelings acknowledged     "

## 2024-11-17 NOTE — PLAN OF CARE
Goal Outcome Evaluation:      Plan of Care Reviewed With: patient    Overall Patient Progress: no changeOverall Patient Progress: no change     Pt denied CP, SOB, N/V, dizziness, and pain during this shift. Pt is A&O x4. Pt has a R port-a-cath de-accessed. Pt is on blood sugar checks before meals and bedtime. Pt is on 1,500 fluid restriction diet. Pt is assist of 1 with walker and GB. Pt is continent of both bowel and bladder,uses commode and urinal. Pt has a L abdominal incision from a paracentesis, bandage CDI. Mepalex on coccyx, UTV. Pt slept through the night. No acute issues during this shift. Pt is able to make needs known, call light is in reach.         Patient's most recent vital signs are:     Vital signs:  BP: 133/52  Temp: 97.6  HR: 61  RR: 16        Patient does not have new respiratory symptoms.  Patient does not have new sore throat.  Patient does not have a fever greater than 99.5.

## 2024-11-17 NOTE — PLAN OF CARE
Goal Outcome Evaluation:         Pt alert and oriented x4. Calm and pleasant throughout cares. Uses urinal at bedside, drain in the toilet and flushed 2x. On oral chemo meds, relabeled by pharmacist. Home supply of oxycodone secured in Nurses' station cabinet after 2 nurses verification. BG checked and recorded, insulin not given. Continent with BM, refused Miralax at bedtime. Pain managed by PRN Oxycodone and Tylenol. With R PAC, not accessed. Mepilex on coccyx, changed. Had good appetite for dinner, on regular diet, able to take pills whole, on 1500 mL fluid restriction. Writer offered of bariatric bed, pt declined and will monitor if needed. Tubigrips removed on BLE. Denied any chets pain, SOB, N?V or headache. Safety measures in place. Will continue POC.         Patient's most recent vital signs are:     Vital signs:  BP: 133/52  Temp: 97.6  HR: 61  RR: 16        Patient does not have new respiratory symptoms.  Patient does not have new sore throat.  Patient does not have a fever greater than 99.5.

## 2024-11-17 NOTE — PLAN OF CARE
Occupational Therapy Discharge Summary    Reason for therapy discharge:    Discharged to transitional care facility.    Progress towards therapy goal(s). See goals on Care Plan in [facility] electronic health record for goal details.  Goals partially met.  Barriers to achieving goals:   discharge from facility.    Therapy recommendation(s):    Continued therapy is recommended.  Rationale/Recommendations:  TCU to maximize functional independence, strength and endurance for ADLS/IADLS.

## 2024-11-17 NOTE — H&P
[facility]    History and Physical - Hospitalist Service       Date of Admission:  11/16/2024    Assessment & Plan      Gucci Logan is a 74 year old male admitted on 11/16/2024.    Gucci Logan is a 74-year-old male with history of HTN, metastatic prostate cancer, cirrhosis secondary to chronic HCV infection who presented to hospital (10/29--11/16) for a heart failure exacerbation 2/2 hypervolemia.  He was started on diuretics. Also found to have SBP, sp iv abx and RENNY.    Transferred to TCU for ongoing rehab needs.       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   Ascites.     Longstanding hx of HCM, recently established care w/ [facility] for HF. Has been struggling w/ worsening BLE edema, increased abdominal distension (weeping ascites), from poorly controlled hypervolemia. PTA diuretic regimen w/ Spironolactone was deemed inadequate, admitted for more aggressive titration of diuretic regimen w/ close cardiac monitoring. Started on Bumex drip here by Cards. Repeat ECHO 10/30/24 showing hyperkinetic w/ EF 65-70%, dynamic outflow tract obstruction, peak gradient 119mmHg at rest, grade II moderate diastolic dysfunction - on most recent echo 11/8 this gradient was lower (80) indicative of probable over-estimation on prior.  Transferred to Medicine service 10/31 given a diagnosis of SBP from ascites.  He was aggressively diuresed at the beginning of his hospitalization but subsequent developed acute kidney injury and borderline BP. His metoprolol was reduced early in his admission and resumed at home dose on 11/9.  His shortness of breath improved after restarting his metoprolol.  -His prior to admission valsartan was held during admission due to low blood pressures and dizziness.    VQ scan from 11/11/24 negative     - Continue metoprolol succinate 200 mg daily  - Continue aspirin 81 mg daily.    - holding diuretics now. Consider prn.   - holding Valsartan   - Avoid vasodilators  - Ranolazine was stopped d/t no improvement of his symptoms  - Follow-up daily wt. I/o. Frequent bmp. Monitor vitals.   - monitor lytes: keep K >4, Mg >2. RN protocol prn  - lymphedema wraps  - Elevate legs.   - He is already being coordinated for myosin inhibitors as an outpatient and he will follow up with Dr. Gonsales on an outpatient basis.      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    Primary Hepatologist: Dr. Renteria   Etiology: HCV/MASLD     MELD 3.0: 11 at 8/21/2024  6:34 AM  MELD-Na: 10 at 8/21/2024  6:34 AM  Calculated from:  Serum Creatinine: 1.04 mg/dL at 8/21/2024  6:34 AM  Serum Sodium: 140 mmol/L (Using max of 137 mmol/L) at 8/21/2024  6:34 AM  Total Bilirubin: 1.1 mg/dL at 8/20/2024  6:06 AM  Serum Albumin: 2.7 g/dL at 8/20/2024  6:06 AM  INR(ratio): 1.32 at 8/19/2024  5:14 PM  Age at listing (hypothetical): 74 years  Sex: Male at 8/21/2024  6:34 AM     - continue cipro for sbp ppx  - Therapeutic paracentesis prn. Consult IR prn  - outpatient liver team  -  monitor LFT  - Diuresis as above (holding)     RENNY, suspect prerenal, improved but stalled out Cr around 1.5-1.6  CKD Stage III  Each time diuretic has been ordered he has had a significant increase in his creatinine.   - Holding diuresis, consider starting him on maintenance dosing of diuretic once his creatinine has improved at TCU.  - follow-up bmp, I.o.   - avoid nephrotoxics.      Anemia of chronic disease, + possible small volume intraperitoneal bleed following paracentesis  BRBPR 11/7  Hx of Melena.   During admission 11/10: hgb 6.8. given 1 U PRBC. He had a likely hemorrhoidal bleed given presence of internal hemorrhoids on rectal exam . GI  preformed EGD on 11/11/24 which  showed grade I varices with no bleeding and no stigmata of recent bleeding were found in the lower third of the esophagus.  Mild portal hypertensive gastropathy was found in the gastric fundus and in the gastric body.   -Cardiology would like hemoglobin >8 if light headed and this should be considered at TCU.   -Monitor cbc.         Metastatic Prostate Cancer  Chronic Pancytopenia   Initially diagnosed earlier this year incidentally following a fall at home, fracturing his L femur. ORIF 05/24/24 and curretage samples c/w adenocarcinoma of prostate origin. Has since followed w/ Oncology as an OP, last saw 10/24/24. PTA on Daralutamide BID, Docetaxel (C4D1 was on 10/24/24) and Leuprolide [id number] (last 9/5/24). Also gets Neulasta, last 10/25. Has chronic pancytopenia, likely d/t multiple comorbidities (cirrhosis, CKD) and iatrogenic (chemotherapy). Baseline hgb ~8-9 recently.   -Oncology consulted here 10/31 and recommended to continue Daralutamide.   - continue pta prednisone. 5 mg daily.   -Oncology outpt follow-up.      Type 2 Diabetes Mellitus, non-insulin-dependent, well-controlled  Last A1c 5.8% on 10/29/24.   HOLDING PTA Metformin given RENNY  Sliding scale insulin   Monitor BG  Recent Labs   Lab 11/17/24  0911 11/16/24  2121 11/16/24  1755 11/16/24  1242 11/16/24  1148 11/16/24  0902   * 162* 126* 159* 155* 109*        Immunizations: Uptodate. See MIIC    Most Recent Immunizations   Administered Date(s) Administered    COVID-19 12+ (MODERNA) 03/05/2024    COVID-19 12+ (Pfizer) 09/28/2024    COVID-19 Bivalent 18+ (Moderna) 05/10/2023    COVID-19 Monovalent 18+ (Moderna) 07/07/2022    Flu 65+ (Fluad) 10/05/2024    HEPA 03/01/2005    HepB 03/01/2005    Hepatitis A (ADULT 19+) 05/10/2023    Influenza (High Dose) Trivalent,PF (Fluzone) 08/24/2016    Influenza (IIV3) PF 12/12/2012    Influenza Vaccine 65+ (FLUAD) 09/02/2023    Influenza Vaccine >6 months,quad, PF 11/02/2020    Pneumo Conj 13-V (2010&after)  "11/02/2020    Pneumococcal 23 valent 11/03/2021    RSV Vaccine (Arexvy) 09/02/2023    TD,PF 7+ (Tenivac) 09/14/2006    TDAP Vaccine (Boostrix) 10/06/2016    Td (Adult), Adsorbed 09/24/1999    Twinrix A/B 05/10/2023    Zoster recombinant adjuvanted (SHINGRIX) 02/09/2022   Pended Date(s) Pended    Mantoux Tuberculin Skin Test 12/08/2024   Deferred Date(s) Deferred    Mantoux Tuberculin Skin Test 11/17/2024            Diet: Regular Diet Adult  Fluid restriction 1500 ML FLUID  Snacks/Supplements Adult: Special K Bar; Between Meals  Snacks/Supplements Adult: Other; If pt would like a snack or suppelment, please send; With Meals    DVT Prophylaxis: Pneumatic Compression Devices  Rankin Catheter: Not present  Lines: None     Cardiac Monitoring: None  Code Status: Full Code      Clinically Significant Risk Factors Present on Admission          # Hyperchloremia: Highest Cl = 110 mmol/L in last 2 days, will monitor as appropriate          # Hypoalbuminemia: Lowest albumin = 2.6 g/dL at 11/16/2024  3:43 AM, will monitor as appropriate   # Drug Induced Platelet Defect: home medication list includes an antiplatelet medication   # Hypertension: Noted on problem list      # Anemia: based on hgb <11       # Obesity: Estimated body mass index is 34.7 kg/m  as calculated from the following:    Height as of this encounter: 1.803 m (5' 11\").    Weight as of this encounter: 112.9 kg (248 lb 12.8 oz).       # Financial/Environmental Concerns:           Disposition Plan     Medically Ready for Discharge: Anticipated in 5+ Days           Joshua Mosley MD  Hospitalist Service  [facility]  Securely message with Valentina (more info)  Text page via [facility] Paging/Directory     ______________________________________________________________________    Chief Complaint   Physical deconditioning.     History is obtained from the patient and electronic health record    History of Present Illness     Gucci Logan is a " 74-year-old male with history of HTN, metastatic prostate cancer, cirrhosis secondary to chronic HCV infection who presented to hospital (10/29--11/16) for a heart failure exacerbation 2/2 hypervolemia.  He was started on diuretics. Also found to have SBP, sp iv abx and RENNY. Also noted acute anemia, requiring blood transfusion. Ascites sp therapeutic paracentesis.     See above for the details.     Currently doing well. Physical deconditioning- improving. Ascites+ BLE edema + Denies fever or chills. No cough or cp or sob. No LH or dizziness. No NVD. No dysuria or bowel complaint. No new sensory or motor complaint. No new rash, no obvious bleeding. No other concern.        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    Diverticular disease of colon 07/14/2015    Esophageal reflux 03/01/2014    Gastroesophageal reflux disease, esophagitis presence not specified 10/06/2016    IMO Regulatory Load OCT 2020      Glaucoma suspect of both eyes 04/30/2024    Hearing problem     Hiatal hernia     Hyperlipidemia LDL goal <100 04/19/2017    Hypertension     Hypertension goal BP (blood pressure) < 140/90 02/08/2013    Intertrochanteric fracture of left femur, closed, initial encounter (H) 05/19/2024    Jaundice 05/31/2008    Liver disease     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 Not keen to use it.     Stage 3 chronic kidney disease, unspecified whether stage 3a or 3b CKD (H) 01/23/2024    Syncope, unspecified syncope type 02/20/2023    Thrombocytopenia (H) 08/28/2008    Overview:   8/28/2008, attributed to liver disease with portal hypertension and functional splenomegaly         Past Surgical History   Past Surgical History:   Procedure Laterality Date    ARTHROSCOPY KNEE RT/LT      (L) with partial medial meniscectomy    CATARACT IOL, RT/LT  Nov and Dec 2017    COLONOSCOPY N/A 4/24/2019    Procedure: COLONOSCOPY, WITH POLYPECTOMY AND BIOPSY;  Surgeon: Leventhal, Thomas Michael, MD;  Location: [facility] OR    COLONOSCOPY N/A 9/14/2022    Procedure: COLONOSCOPY;  Surgeon: Rafa Renee MD;  Location:  GI    DACRYOCYSTORHINOSTOMY Left 10/16/2018    Procedure: DACRYOCYSTORHINOSTOMY;  Surgeon: Madhu Krause MD;  Location: [facility]    ENDOSCOPIC ENDONASAL SURGERY  1994    ENDOSCOPY  2-19-15    ESOPHAGOSCOPY, GASTROSCOPY, DUODENOSCOPY (EGD), COMBINED N/A 4/24/2019    Procedure: COMBINED ESOPHAGOSCOPY, GASTROSCOPY, DUODENOSCOPY (EGD) - hold aspirin ibuprofen or naproxen for one week prior (per physician order);  Surgeon: Leventhal, Thomas Michael, MD;  Location: [facility] OR    ESOPHAGOSCOPY, GASTROSCOPY, DUODENOSCOPY (EGD), COMBINED N/A 5/23/2023    Procedure: Esophagoscopy, gastroscopy, duodenoscopy, combined;  Surgeon: Rafa Renee MD;  Location:  GI    ESOPHAGOSCOPY, GASTROSCOPY, DUODENOSCOPY (EGD), COMBINED N/A 11/11/2024    Procedure: Esophagoscopy, gastroscopy, duodenoscopy (EGD), combined;  Surgeon: Home Morataya MD;  Location:  GI     EYE SURGERY      Hernia surgery Left 1994    NASAL/SINUS POLYPECTOMY      OPEN REDUCTION INTERNAL FIXATION HIP NAILING Left 5/20/2024    Procedure: open reduction internal fixation hip nailing left;  Surgeon: Rafa Wagner MD;  Location: [facility] OR    REPAIR PTOSIS Left 10/16/2018    Procedure: LEFT UPPER LID PTOSIS AND BILATERAL  BROW PTOSIS REPAIR WITH LEFT DACRYOCYSTORHINOSTOMY ;  Surgeon: Madhu Krause MD;  Location:  [facility]    REPAIR PTOSIS BROW Bilateral 10/16/2018    Procedure: REPAIR PTOSIS BROW;  Surgeon: Madhu Krause MD;  Location:  [facility]    ROTATOR CUFF REPAIR RT/LT Right     ZZC OPEN RX ANKLE DISLOCATN+FIXATN      (R)    ZZC SPINAL FUSION,ANT,EA ADNL LEVEL      T12 - L1       Prior to Admission Medications   Prior to Admission Medications   Prescriptions Last Dose Informant Patient Reported? Taking?   acetaminophen (TYLENOL) 325 MG tablet   No No   Sig: Take 2 tablets (650 mg) by mouth every 4 hours as needed for other (For optimal non-opioid multimodal pain management to improve pain control.)   aspirin 81 MG EC tablet   No No   Sig: Take 1 tablet (81 mg) by mouth daily   bumetanide (BUMEX) 2 MG tablet   No No   Sig: Take 1 tablet (2 mg) by mouth daily.   calcium citrate (CITRACAL) 950 (200 Ca) MG tablet   No No   Sig: Take 1 tablet (950 mg) by mouth daily   darolutamide (NUBEQA) 300 MG tablet   No No   Sig: Take 2 tablets (600 mg) by mouth 2 times daily for 21 days. . Swallow tablets whole. Take with food. Avoid grapefruit or grapefruit juice.   diclofenac (VOLTAREN) 1 % topical gel   No No   Sig: Apply 4 g topically 3 times daily as needed for moderate pain (bursitis)   metFORMIN (GLUCOPHAGE) 500 MG tablet   No No   Sig: Take 1 tablet (500 mg) by mouth 2 times daily (with meals).   methocarbamol (ROBAXIN) 500 MG tablet   Yes No   Sig: Take 1 Tablet (500 mg) by mouth every 6 hours if needed for Muscle Spasm PO 1st choice.   metoprolol succinate ER (TOPROL XL) 100 MG 24 hr tablet   No No    Si 1/2 ( 150 mg ) po daily   multivitamin w/minerals (THERA-VIT-M) tablet  Self Yes No   Sig: Take 1 tablet by mouth daily   omeprazole (PRILOSEC) 20 MG DR capsule   No No   Sig: TAKE 1 CAPSULE BY MOUTH ONCE DAILY 30 TO 60 MINUTES BEFORE A MEAL   oxyCODONE (ROXICODONE) 5 MG tablet   No No   Sig: Take 1-2 tablets (5-10 mg) by mouth every 6 hours as needed for pain.   predniSONE (DELTASONE) 5 MG tablet   No No   Sig: Take 1 tablet (5 mg) by mouth daily (with breakfast) Do not take prednisone on days when taking dexamethasone.   tiZANidine (ZANAFLEX) 4 MG tablet   No No   Sig: TAKE 1/2 (ONE-HALF) TO 1  TABLET BY MOUTH UP TO THREE TIMES DAILY AS NEEDED FOR MUSCLE PAIN   traMADol (ULTRAM) 50 MG tablet   Yes No   Sig: Take 50 mg by mouth daily as needed for severe pain.   valsartan (DIOVAN) 160 MG tablet   No No   Sig: Take 1 tablet (160 mg) by mouth daily   vitamin D3 (CHOLECALCIFEROL) 50 mcg (2000 units) tablet   No No   Sig: Take 1 tablet (50 mcg) by mouth daily      Facility-Administered Medications: None      Current Facility-Administered Medications   Medication Dose Route Frequency Provider Last Rate Last Admin    [START ON 2024] - Skin Test Reading -   Does not apply Q21 Days Joshua Mosley MD        acetaminophen (TYLENOL) Suppository 650 mg  650 mg Rectal Q4H PRN Saumya Torres,         acetaminophen (TYLENOL) tablet 650 mg  650 mg Oral Q4H PRN Saumya Torres, DO   650 mg at 24 2236    alteplase (CATHFLO ACTIVASE) injection 1-2 mg  1-2 mg Intravenous Q2H PRN Saumya Torres, DO        alum & mag hydroxide-simethicone (MAALOX) suspension 30 mL  30 mL Oral Q4H PRN Saumya Torres, DO        aspirin (ASA) chewable tablet 81 mg  81 mg Oral Daily Fluevelyn, Saumya, DO   81 mg at 24 0915    calcium carbonate (TUMS) chewable tablet 500 mg  500 mg Oral Daily PRN Saumya Torres,         calcium citrate (CITRACAL) tablet 950 mg  950 mg Oral Daily Fluke, Saumya, DO   950 mg at 24 0914     ciprofloxacin (CIPRO) tablet 500 mg  500 mg Oral Q24H [facility] Haja Torresison, DO   500 mg at 11/17/24 0915    darolutamide (NUBEQA) tablet 600 mg  600 mg Oral BID Fluevelyn, Saumya, DO   600 mg at 11/17/24 0921    glucose gel 15-30 g  15-30 g Oral Q15 Min PRN Brian, Saumya, DO        Or    dextrose 50 % injection 25-50 mL  25-50 mL Intravenous Q15 Min PRN Fluevelyn, Saumya, DO        Or    glucagon injection 1 mg  1 mg Subcutaneous Q15 Min PRN Fluevelyn, Saumya, DO        diclofenac (VOLTAREN) 1 % topical gel 4 g  4 g Topical TID PRN Fluevelyn, Saumya, DO        famotidine (PEPCID) tablet 20 mg  20 mg Oral BID PRN Joshua Mosley MD        heparin lock flush 10 unit/mL injection 5-10 mL  5-10 mL Intracatheter Q24H Fluevelyn, Saumya, DO        heparin lock flush 10 unit/mL injection 5-10 mL  5-10 mL Intracatheter Q1H PRN Saumya Torres, DO        [START ON 11/26/2024] heparin lock flush 100 unit/mL injection 5-10 mL  5-10 mL Intracatheter Q28 Days Fluevelyn, Saumya, DO        insulin aspart (NovoLOG) injection (RAPID ACTING)  1-7 Units Subcutaneous TID AC Brian Saumya, DO        insulin aspart (NovoLOG) injection (RAPID ACTING)  1-5 Units Subcutaneous At Bedtime Fluevelyn Saumya, DO        lidocaine (LMX4) cream   Topical Q1H PRN Brian, Saumya, DO        lidocaine 1 % 0.1-1 mL  0.1-1 mL Other Q1H PRN Fluke, Saumya, DO        medication instruction   Does not apply Continuous PRN Fluevelyn, Saumya, DO        methocarbamol (ROBAXIN) tablet 500 mg  500 mg Oral Q6H PRN Brian, Saumya, DO        metoprolol succinate ER (TOPROL XL) 24 hr tablet 200 mg  200 mg Oral Daily Fluevelyn, Saumya, DO   200 mg at 11/17/24 0915    mineral oil-white petrolatum (EUCERIN/MINERIN) cream   Topical Q1H PRN Joshua Moslye MD        naloxone (NARCAN) injection 0.2 mg  0.2 mg Intravenous Q2 Min PRN Fluke, Saumya, DO        Or    naloxone (NARCAN) injection 0.4 mg  0.4 mg Intravenous Q2 Min PRN Fluke, Saumya, DO        Or    naloxone (NARCAN) injection 0.2 mg   0.2 mg Intramuscular Q2 Min PRN Saumya Torres DO        Or    naloxone (NARCAN) injection 0.4 mg  0.4 mg Intramuscular Q2 Min PRN Saumya Torres DO        nitroGLYcerin (NITROSTAT) sublingual tablet 0.4 mg  0.4 mg Sublingual Q5 Min PRN Saumya Torres DO        Nurse may request from Pharmacy a change of form of medication (e.g. Liquid to tablet).   Does not apply Continuous PRN Joshua Mosley MD        oxyCODONE (ROXICODONE) tablet 5-10 mg  5-10 mg Oral Q6H PRN Saumya Torres DO   5 mg at 11/16/24 2236    pantoprazole (PROTONIX) EC tablet 40 mg  40 mg Oral QAM AC Saumya Torres DO   40 mg at 11/17/24 0915    polyethylene glycol (MIRALAX) Packet 17 g  17 g Oral BID Saumya Torres DO        predniSONE (DELTASONE) tablet 5 mg  5 mg Oral Daily Saumya Torres DO   5 mg at 11/17/24 0915    prochlorperazine (COMPAZINE) tablet 5 mg  5 mg Oral Q6H PRN Saumya Torres DO        senna-docusate (SENOKOT-S/PERICOLACE) 8.6-50 MG per tablet 1 tablet  1 tablet Oral BID PRN Saumya Torres DO        Or    senna-docusate (SENOKOT-S/PERICOLACE) 8.6-50 MG per tablet 2 tablet  2 tablet Oral BID PRN Saumya Torres DO        [START ON 11/26/2024] sodium chloride (PF) 0.9% PF flush 10-20 mL  10-20 mL Intracatheter Q28 Days Saumya Torres DO        sodium chloride (PF) 0.9% PF flush 10-20 mL  10-20 mL Intracatheter q1 min prn Saumya Torres,         sodium chloride (PF) 0.9% PF flush 10-20 mL  10-20 mL Intracatheter Q1H PRN Saumya Torres, DO        sodium chloride (PF) 0.9% PF flush 3 mL  3 mL Intracatheter Q8H Saumya Torres,         sodium chloride (PF) 0.9% PF flush 3 mL  3 mL Intracatheter q1 min prn Saumya Torres,         tuberculin injection 5 Units  5 Units Intradermal Q21 Days Joshua Mosley MD            Review of Systems    The 10 point Review of Systems is negative other than noted in the HPI or here.     Social History   I have reviewed this patient's social history and updated it with pertinent information  if needed.  Social History     Tobacco Use    Smoking status: Former     Current packs/day: 0.00     Types: Cigarettes     Start date: 1990     Quit date: 1999     Years since quittin.8     Passive exposure: Never    Smokeless tobacco: Never   Vaping Use    Vaping status: Never Used   Substance Use Topics    Alcohol use: Yes     Comment: rare    Drug use: No         Family History   I have reviewed this patient's family history and updated it with pertinent information if needed.  Family History   Problem Relation Age of Onset    Alzheimer Disease Mother 85    Dementia Mother     Cerebrovascular Disease Father 50    Cancer Father     Hypertension Father     Neurologic Disorder No family hx of     Diabetes No family hx of          Allergies   Allergies   Allergen Reactions    Bee Venom Swelling     Gum swelling    Haemophilus B Polysaccharide Vaccine Other (See Comments)     PN: delirium  fever    Haemophilus Influenzae Nausea and Other (See Comments)     PN: delirium  fever    Other Reaction(s): Fever    PN: delirium  fever   PN: delirium  fever    Pneumococcal Vaccine      Other Reaction(s): *Unknown, adverse reaction        Physical Exam   Vital Signs: Temp: 97.8  F (36.6  C) Temp src: Oral BP: 119/52 Pulse: 66   Resp: 16 SpO2: 96 % O2 Device: None (Room air)    Weight: 248 lbs 12.8 oz    General Appearance: Awake, interactive, NAD, very pleasant.   HEENT: AT/NC, Anicteric, Moist MM  Neck: Supple.   Respiratory: Normal work of breathing. CTA BL.   Cardiovascular: S1 S2 Regular, systolic murmur.   GI/Abd: Soft. NT. Mod diffuse distention . No g/r.   Extremities: BLE 2+ pedal edema.  Neuro: AO x 4, Grossly non focal.   Skin: No acute rash on exposed areas.   Psychiatry: Stable mood.     Medical Decision Making       55 MINUTES SPENT BY ME on the date of service doing chart review, history, exam, documentation & further activities per the note.      Data         Imaging results reviewed over the past 24  hrs:   No results found for this or any previous visit (from the past 24 hours).  Recent Labs   Lab 11/17/24  0911 11/16/24  2121 11/16/24  1755 11/16/24  0800 11/16/24  0343 11/15/24  1836 11/15/24  1702 11/15/24  0809 11/15/24  0422 11/14/24  0857 11/14/24  0535   WBC  --   --   --   --  3.6*  --  3.7*  --  4.6  --  4.1   HGB  --   --   --   --  7.9*  --  8.3*  --  8.4*  --  8.2*   MCV  --   --   --   --  106*  --  105*  --  105*  --  104*   PLT  --   --   --   --  51*  --  51*  --  55*  --  56*   NA  --   --   --   --  142  --   --   --  143  --  139   POTASSIUM  --   --   --   --  4.0  --   --   --  4.2  --  4.1   CHLORIDE  --   --   --   --  110*  --   --   --  110*  --  108*   CO2  --   --   --   --  25  --   --   --  24  --  24   BUN  --   --   --   --  43.6*  --   --   --  45.9*  --  45.0*   CR  --   --   --   --  1.51*  --   --   --  1.62*  --  1.28*   ANIONGAP  --   --   --   --  7  --   --   --  9  --  7   SOLEDAD  --   --   --   --  7.8*  --   --   --  7.9*  --  8.2*   * 162* 126*   < > 126*   < >  --    < > 172*   < > 139*   ALBUMIN  --   --   --   --  2.6*  --   --   --  2.7*  --  2.6*   PROTTOTAL  --   --   --   --  4.4*  --   --   --  4.8*  --  4.5*   BILITOTAL  --   --   --   --  0.6  --   --   --  0.5  --  0.8   ALKPHOS  --   --   --   --  221*  --   --   --  248*  --  209*   ALT  --   --   --   --  37  --   --   --  44  --  36   AST  --   --   --   --  55*  --   --   --  69*  --  53*    < > = values in this interval not displayed.

## 2024-11-17 NOTE — PLAN OF CARE
"Goal Outcome Evaluation:       Plan of Care Reviewed With: patient     Overall Patient Progress: improvingOverall Patient Progress: improving     FOCUS/GOAL     Bowel management, Bladder management, Nutrition/Feeding/Swallowing precautions, Mobility, Skin integrity, and Safety management         VS: /52 (BP Location: Right arm)   Pulse 66   Temp 97.8  F (36.6  C) (Oral)   Resp 16   Ht 1.803 m (5' 11\")   Wt 112.9 kg (248 lb 12.8 oz)   SpO2 96%   BMI 34.70 kg/m       O2: 96% RA   Output: Adequate; urinal at bedside   Last BM: 11/17/2024   Activity: SBA walker   Skin: Coccyx mepilex, ascites   Pain: denies   CMS: Denies SOB, chest pain, headache, has numbness and tingling   Dressing: Coccyx dressing   Diet: FR 1500 ml; Regular, thin, pills whole   LDA: R port a cath not accessed   Equipment: Walker, commode, urinal    Plan: Continue POC   Additional Info:                "

## 2024-11-18 ENCOUNTER — APPOINTMENT (OUTPATIENT)
Dept: OCCUPATIONAL THERAPY | Facility: SKILLED NURSING FACILITY | Age: 74
DRG: 314 | End: 2024-11-18
Attending: INTERNAL MEDICINE
Payer: COMMERCIAL

## 2024-11-18 ENCOUNTER — PATIENT OUTREACH (OUTPATIENT)
Dept: CARE COORDINATION | Facility: CLINIC | Age: 74
End: 2024-11-18
Payer: COMMERCIAL

## 2024-11-18 ENCOUNTER — APPOINTMENT (OUTPATIENT)
Dept: PHYSICAL THERAPY | Facility: SKILLED NURSING FACILITY | Age: 74
DRG: 314 | End: 2024-11-18
Attending: INTERNAL MEDICINE
Payer: COMMERCIAL

## 2024-11-18 ENCOUNTER — TELEPHONE (OUTPATIENT)
Dept: ONCOLOGY | Facility: CLINIC | Age: 74
End: 2024-11-18
Payer: COMMERCIAL

## 2024-11-18 LAB
ALBUMIN SERPL BCG-MCNC: 2.6 G/DL (ref 3.5–5.2)
ALP SERPL-CCNC: 221 U/L (ref 40–150)
ALT SERPL W P-5'-P-CCNC: 31 U/L (ref 0–70)
ANION GAP SERPL CALCULATED.3IONS-SCNC: 8 MMOL/L (ref 7–15)
AST SERPL W P-5'-P-CCNC: 43 U/L (ref 0–45)
ATRIAL RATE - MUSE: 67 BPM
BILIRUB DIRECT SERPL-MCNC: <0.2 MG/DL (ref 0–0.3)
BILIRUB SERPL-MCNC: 0.8 MG/DL
BUN SERPL-MCNC: 43.8 MG/DL (ref 8–23)
CALCIUM SERPL-MCNC: 8.1 MG/DL (ref 8.8–10.4)
CHLORIDE SERPL-SCNC: 110 MMOL/L (ref 98–107)
CREAT SERPL-MCNC: 1.49 MG/DL (ref 0.67–1.17)
DIASTOLIC BLOOD PRESSURE - MUSE: NORMAL MMHG
EGFRCR SERPLBLD CKD-EPI 2021: 49 ML/MIN/1.73M2
ERYTHROCYTE [DISTWIDTH] IN BLOOD BY AUTOMATED COUNT: 20.5 % (ref 10–15)
GLUCOSE BLDC GLUCOMTR-MCNC: 101 MG/DL (ref 70–99)
GLUCOSE BLDC GLUCOMTR-MCNC: 121 MG/DL (ref 70–99)
GLUCOSE SERPL-MCNC: 126 MG/DL (ref 70–99)
HCO3 SERPL-SCNC: 26 MMOL/L (ref 22–29)
HCT VFR BLD AUTO: 28.3 % (ref 40–53)
HGB BLD-MCNC: 9 G/DL (ref 13.3–17.7)
INTERPRETATION ECG - MUSE: NORMAL
MAGNESIUM SERPL-MCNC: 2.1 MG/DL (ref 1.7–2.3)
MCH RBC QN AUTO: 33.8 PG (ref 26.5–33)
MCHC RBC AUTO-ENTMCNC: 31.8 G/DL (ref 31.5–36.5)
MCV RBC AUTO: 106 FL (ref 78–100)
P AXIS - MUSE: 40 DEGREES
PLATELET # BLD AUTO: 60 10E3/UL (ref 150–450)
POTASSIUM SERPL-SCNC: 4.5 MMOL/L (ref 3.4–5.3)
PR INTERVAL - MUSE: 176 MS
PROT SERPL-MCNC: 4.6 G/DL (ref 6.4–8.3)
QRS DURATION - MUSE: 96 MS
QT - MUSE: 450 MS
QTC - MUSE: 475 MS
R AXIS - MUSE: -40 DEGREES
RBC # BLD AUTO: 2.66 10E6/UL (ref 4.4–5.9)
SODIUM SERPL-SCNC: 144 MMOL/L (ref 135–145)
SYSTOLIC BLOOD PRESSURE - MUSE: NORMAL MMHG
T AXIS - MUSE: 92 DEGREES
VENTRICULAR RATE- MUSE: 67 BPM
WBC # BLD AUTO: 3.6 10E3/UL (ref 4–11)

## 2024-11-18 PROCEDURE — 83735 ASSAY OF MAGNESIUM: CPT | Performed by: INTERNAL MEDICINE

## 2024-11-18 PROCEDURE — 120N000009 HC R&B SNF

## 2024-11-18 PROCEDURE — 80076 HEPATIC FUNCTION PANEL: CPT | Performed by: INTERNAL MEDICINE

## 2024-11-18 PROCEDURE — 85018 HEMOGLOBIN: CPT | Performed by: INTERNAL MEDICINE

## 2024-11-18 PROCEDURE — 97535 SELF CARE MNGMENT TRAINING: CPT | Mod: GO | Performed by: OCCUPATIONAL THERAPIST

## 2024-11-18 PROCEDURE — 250N000013 HC RX MED GY IP 250 OP 250 PS 637: Performed by: STUDENT IN AN ORGANIZED HEALTH CARE EDUCATION/TRAINING PROGRAM

## 2024-11-18 PROCEDURE — 80053 COMPREHEN METABOLIC PANEL: CPT | Performed by: INTERNAL MEDICINE

## 2024-11-18 PROCEDURE — 82248 BILIRUBIN DIRECT: CPT | Performed by: INTERNAL MEDICINE

## 2024-11-18 PROCEDURE — 82310 ASSAY OF CALCIUM: CPT | Performed by: INTERNAL MEDICINE

## 2024-11-18 PROCEDURE — 97110 THERAPEUTIC EXERCISES: CPT | Mod: GP

## 2024-11-18 PROCEDURE — 250N000012 HC RX MED GY IP 250 OP 636 PS 637: Performed by: STUDENT IN AN ORGANIZED HEALTH CARE EDUCATION/TRAINING PROGRAM

## 2024-11-18 PROCEDURE — 97165 OT EVAL LOW COMPLEX 30 MIN: CPT | Mod: GO | Performed by: OCCUPATIONAL THERAPIST

## 2024-11-18 PROCEDURE — 82565 ASSAY OF CREATININE: CPT | Performed by: INTERNAL MEDICINE

## 2024-11-18 PROCEDURE — 36415 COLL VENOUS BLD VENIPUNCTURE: CPT | Performed by: INTERNAL MEDICINE

## 2024-11-18 PROCEDURE — 82947 ASSAY GLUCOSE BLOOD QUANT: CPT | Performed by: INTERNAL MEDICINE

## 2024-11-18 RX ADMIN — ACETAMINOPHEN 650 MG: 325 TABLET, FILM COATED ORAL at 01:19

## 2024-11-18 RX ADMIN — Medication 950 MG: at 11:08

## 2024-11-18 RX ADMIN — CIPROFLOXACIN HYDROCHLORIDE 500 MG: 500 TABLET, FILM COATED ORAL at 08:48

## 2024-11-18 RX ADMIN — ASPIRIN 81 MG CHEWABLE TABLET 81 MG: 81 TABLET CHEWABLE at 08:47

## 2024-11-18 RX ADMIN — ACETAMINOPHEN 650 MG: 325 TABLET, FILM COATED ORAL at 21:10

## 2024-11-18 RX ADMIN — OXYCODONE HYDROCHLORIDE 10 MG: 5 TABLET ORAL at 21:10

## 2024-11-18 RX ADMIN — METOPROLOL SUCCINATE 200 MG: 200 TABLET, EXTENDED RELEASE ORAL at 08:47

## 2024-11-18 RX ADMIN — OXYCODONE HYDROCHLORIDE 10 MG: 5 TABLET ORAL at 01:19

## 2024-11-18 RX ADMIN — PREDNISONE 5 MG: 5 TABLET ORAL at 08:47

## 2024-11-18 RX ADMIN — PANTOPRAZOLE SODIUM 40 MG: 40 TABLET, DELAYED RELEASE ORAL at 06:41

## 2024-11-18 ASSESSMENT — ACTIVITIES OF DAILY LIVING (ADL)
ADLS_ACUITY_SCORE: 0
ADLS_ACUITY_SCORE: 0
DEPENDENT_IADLS:: CLEANING;SHOPPING;TRANSPORTATION
ADLS_ACUITY_SCORE: 0
BADLS,_PREVIOUS_FUNCTIONAL_LEVEL: INDEPENDENT
ADLS_ACUITY_SCORE: 0
IADLS,_PREVIOUS_FUNCTIONAL_LEVEL: INDEPENDENT
ADLS_ACUITY_SCORE: 0

## 2024-11-18 NOTE — ORAL ONC MGMT
"Oral Chemotherapy Monitoring Program     Placed call to patient in follow up of oral chemotherapy. Patient wanted confirmation that he no longer is doing the docestaxel infusions. Per Dr Plunkett IB today 11/18: \"Done with docetaxel after 4 cycles. Continue only leuprolide and darolutamide.\" Relayed this info to patient. Patient asked if he still takes the oral chemo daily, and I confirmed with him to continue the oral chemo. He then asked if he could get this Leuprolide injections at [facility] and I said I wasn't sure if they did chemotherapy injections there. He didn't believe so either. Plan to continue injections at [facility].     Next injection due 12/5. Has labs & visit with Dimitrios 12/5. Next refill due 11/21. Will need to ensure patietn gets delivery to right place depending on if he is still at TCU or if he's back home.    Montserrat De Luna, PharmD  Oral Chemotherapy Monitoring Program  [facility]  [phone]       "

## 2024-11-18 NOTE — PROGRESS NOTES
Clinic Care Coordination Contact  Care Coordination Transition Communication    Clinical Data: Patient was hospitalized at [facility] from 10/29/2024 to 11/16/2024 for a heart failure exacerbation secondary to hypervolemia.  Patient also found to have SBP, sp iv abx and RENNY per chart review.    Assessment: Patient has transitioned to TCU/ARU for short term rehabilitation:    Facility Name: [address] [address]  4th floor of building  Transition Communication:  Notified facility of Primary Care- Care Coordination support via Epic In-Basket message.    Plan: Care Coordinator will await notification from facility staff informing of patient's discharge plans/needs. Care Coordinator will review chart and outreach to facility staff every 4 weeks and as needed.     Dede Cruz, LINUSW, MSW   [facility]  Care Coordination  [facility]  [phone]  11/18/2024 11:44 AM

## 2024-11-18 NOTE — PROGRESS NOTES
11/18/24 1000   Appointment Info   Signing Clinician's Name / Credentials (OT) alma romeo, otr/l   Living Environment   People in Home alone   Current Living Arrangements condominium   Home Accessibility no concerns   Transportation Anticipated family or friend will provide   Living Environment Comments Pt reports living in a condo in [place], [place] with no stairs to manage. Pt has been dealing with his health since May 2024 when he discovered his cancer, and has fallen at his home in [place], [place]. While undergoing treatment, pt has stayed at Condo due to no stair requirement. BR set up: tub/shower (but does lots of sponge baths), owns a shower bench, aretha commode, 2 hospital beds.   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 Again, pt has been dealing with health since May 2024 and has used a variety of devices and strategies for safety but wants to return to driving and living up in [place],.   Instrumental Activities of Daily Living (IADL)   IADL Comments IND in all IADLS   Previous Level of Function/Home Environm   Bathing, Previous Functional Level independent   Grooming, Previous Functional Level independent   Dressing, Previous Functional Level independent   Eating/Feeding, Previous Functional Level independent   Toileting, Previous Functional Level independent   BADLs, Previous Functional Level independent   IADLs, Previous Functional Level independent   Bed Mobility, Previous Functional Level independent   Transfers, Previous Functional Level independent   Household Ambulation, Previous Functional Level  independent   Stairs, Previous Functional Level independent   Community Ambulation, Previous Functional Level independent   Functional Cognition, Previous Functional Level appears intact/wnl   General Information   Onset of Illness/Injury or Date of Surgery 10/29/24   Referring Physician Joshua Mosley MD   Patient/Family Therapy Goal Statement (OT) 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 Has been struggling w/ worsening BLE edema, increased abdominal distension (weeping ascites), from poorly controlled hypervolemia. PTA diuretic regimen w/ Spironolactone was deemed inadequate, admitted for more aggressive titration of diuretic regimen w/ close cardiac monitoring. Started on Bumex drip here by Cards. Repeat ECHO 10/30/24 showing hyperkinetic w/ EF 65-70%, dynamic outflow tract obstruction, peak gradient 119mmHg at rest, grade II moderate diastolic dysfunction - on most recent echo 11/8 this gradient was lower (80) indicative of probable over-estimation on prior.  Transferred to Medicine service 10/31 given a diagnosis of SBP from ascites.  He was aggressively diuresed at the beginning of his hospitalization but subsequent developed acute kidney injury and borderline BP. His metoprolol was reduced early in his admission and resumed at home dose on 11/9.  His shortness of breath improved after restarting his metoprolol.  -His prior to admission valsartan was held during admission due to low blood pressures and dizziness.    VQ scan from 11/11/24 negative   Existing Precautions/Restrictions fall  (pt adheres to old spinal precautions, bone cx, chemo)   Limitations/Impairments safety/cognitive;sensory   Left Upper Extremity (Weight-bearing Status) full weight-bearing (FWB)   Right Upper Extremity (Weight-bearing Status) full  weight-bearing (FWB)   Left Lower Extremity (Weight-bearing Status) full weight-bearing (FWB)   Right Lower Extremity (Weight-bearing Status) full weight-bearing (FWB)   General Observations and Info Pt is alert, oriented, and able to fully direct his own care. Pt needs reminders to use call light when needing to get up and go to the BR   Cognitive Status Examination   Orientation Status orientation to person, place and time   Cognitive Status Comments Oriented x4, pleasant, able to fully direct his own care   Visual Perception   Impact of Vision Impairment on Function (Vision) pt reports eyes have been watering more, no new vision changes, no glasses   Sensory   Sensory Comments pt reports neuropathy in legs but that BUE are wnl per light touch screening   Range of Motion Comprehensive   Comment, General Range of Motion BUE AROM is WNL   Strength Comprehensive (MMT)   Comment, General Manual Muscle Testing (MMT) Assessment BUE are generally 4/5   Coordination   Coordination Comments GM and FM coordination is wnl   Clinical Impression   Criteria for Skilled Therapeutic Interventions Met (OT) Yes, treatment indicated   OT Diagnosis impaired adls, iadls, functional mobility, activity tolerance   OT Problem List-Impairments impacting ADL activity tolerance impaired;balance;flexibility;mobility;sensation;strength   ADL comments/analysis impaired   Assessment of Occupational Performance 1-3 Performance Deficits   Planned Therapy Interventions (OT) ADL retraining;IADL retraining;balance training;strengthening;transfer training;stretching;ROM;home program guidelines;progressive activity/exercise;risk factor education   Clinical Decision Making Complexity (OT) problem focused assessment/low complexity   Risk & Benefits of therapy have been explained evaluation/treatment results reviewed   Clinical Impression Comments Pt is a 73 y/o male with primary dx being physical deconditioning. Pt has PMH of metastic bone cancer,  falls, CHF, DM2, and thoracic compression fractures from 2023. At baseline, pt reports being independent in all adls, iadls, and mobility. Pt now admitted to ARU with being below baseline, and needing CGA using walker for mobility for short distances, and is max A with LBD, as well as having low activity tolerance. Anticipate pt will benefit from ~1 week stay prior to discharge home with continued therapy.   OT Total Evaluation Time   OT Eval, Low Complexity Minutes (81130) 30   Therapy Certification   Start of Care Date 11/18/24   Certification date from 11/18/24   Certification date to 12/16/24   Medical Diagnosis physical deconditioning   OT Goals   Therapy Frequency (OT) 5 times/week   OT Predicted Duration/Target Date for Goal Attainment 11/24/24   OT Goals Hygiene/Grooming;Upper Body Dressing;Lower Body Dressing;Upper Body Bathing;Lower Body Bathing;Bed Mobility;Transfers;Toilet Transfer/Toileting;Meal Preparation   OT: Hygiene/Grooming modified independent   OT: Upper Body Dressing Independent   OT: Lower Body Dressing Modified independent   OT: Upper Body Bathing Modified independent   OT: Lower Body Bathing Modified independent   OT: Bed Mobility Independent   OT: Transfer Modified independent   OT: Toilet Transfer/Toileting Modified independent   OT: Meal Preparation Modified independent   Self-Care/Home Management   Self-Care/Home Mgmt/ADL, Compensatory, Meal Prep Minutes (99688) 30   Symptoms Noted During/After Treatment (Meal Preparation/Planning Training) fatigue   Treatment Detail/Skilled Intervention please see GG codes for MCKENNA   OT Discharge Planning   OT Plan GG shower needed; wc to shower room to save energy-pt also has hip pain at times; BUE strengthening, work towards mod I in room, try iadls   OT Discharge Recommendation (DC Rec) home with outpatient occupational therapy   OT Brief overview of current status Pt is a 73 y/o male with primary dx being physical deconditioning. Pt has PMH of  metastic bone cancer, falls, CHF, DM2, and thoracic compression fractures from 2023. At baseline, pt reports being independent in all adls, iadls, and mobility. Pt now admitted to ARU with being below baseline, and needing CGA using walker for mobility for short distances, and is max A with LBD, as well as having low activity tolerance. Anticipate pt will benefit from ~1 week stay prior to discharge home with OP therapy. Has all DME needs from OT.   OT Equipment Needed at Discharge   (none, has hospital bed, aretha commode, walker, tub bench)   Total Session Time   Timed Code Treatment Minutes 30   Total Session Time (sum of timed and untimed services) 60   Post Acute Settings Only   What unit is patient on? TCU   OT- Transitional Care Unit Time   Individual Time (minutes) - OT 60   TCU Total Session Time (minutes) - OT 60   TCU Daily Total Session Time   OT TCU Daily Total Session Time 60   Rehab TCU Daily Total Session Time 60   Upper Body Dressing - Ability to dress/undress above the waist, including fasteners   Describe Performance SBA seated in chair to denise/doff t-shirt   Lower Body Dressing - Ability to dress/undress below the waist, includes fasteners   Describe Performance Total A seated in chair to denise/doff shorts   Lower Body Dressing (Putting On/Taking-Off Footwear)   Describe Performance Total A seated in chair to denise compression and hospital socks--unable to bend forward or do figure 4 d/t 2023 compression fracture of thoracic spine   Eating - Ability to use utensils to bring food/liquid to mouth.   Describe Performance IND seated in chair to drink water   Toileting Hygiene - Ability to maintain perineal hygiene and adjust clothes   Describe Performance SBA seated on HH toilet   Toilet transfer - Ability to get on and off a toilet or commode   Describe Performance CGA on/off HH toilet using gb and fww   Personal Hygiene - Ability to maintain personal hygiene (combing hair, shaving, apply makeup  wash/dry face/hands.   Describe Performance CGA standing at sink to wash hands and face   Oral Hygiene - Ability to use suitable items to clean teeth.   Describe Performance CGA standing at sink to complete oral care

## 2024-11-18 NOTE — PLAN OF CARE
Goal Outcome Evaluation:      Plan of Care Reviewed With: patient    Overall Patient Progress: improvingOverall Patient Progress: improving     Overall pt had no acute issue this shift. Pt is alert and oriented x 4. Able to make needs known. Pt denied having chest pain, SOB, N/V, fever and chills. Requested and received PRN oxycodone  and tylenol x 2 for left leg pain. Pt on fluid restriction of 1500 ML. Drank less than 200 ML this shift. Pt transfers with assistance of 1 using a walker and GB. Appears continent of both bowel and bladder. Uses urinal @ bedtime. Pt appears sleeping at this time. Will continue with POC

## 2024-11-18 NOTE — CONSULTS
Social Work: Initial Assessment with Discharge Plan    Patient Name: Gucci Logan  : 1950  Age: 74 year old  MRN: [id number]  Completed assessment with: chart review and pt interview.   Admitted to TCU: 24    Presenting Information   Date of SW assessment: 2024  Health Care Directive: Pt reports he is working with an attourney to complete a living will. He declined a blank HCD to have completed an on file in the meantime.   Primary Health Care Agent: Self  Secondary Health Care Agent: ROMEO  Living Situation: lives alone in a condo with steps.   Previous Functional Status: Dependent ADLs:: Wheelchair-with assist  Dependent IADLs:: Cleaning, Shopping, Transportation  DME available:  wheelchair, manual, commode chair, cane, straight, tub bench, walker, standard   Patient and family understanding of hospitalization: pleasant but unsure of realization with answers.    Cultural/Language/Spiritual Considerations: Pt is a 74 y.o. single . male, English speaking, [place].  Abuse concerns: Pt denied.  -------------------------------------------------------------------------------------------------------------  TRANSPORTATION:    Has lack of transportation kept you from medical appointments, meetings, work, or from getting things needed for daily living?  A. Yes, it has kept me from medical appointments or from getting my medications  B. Yes, it has kept me from non-medical meetings, appointments, work or from getting things that I need  C. No  X. Patient Unable to respond  Y. Patient declines to respond  -------------------------------------------------------------------------------------------------------------  Health Literacy:   How Often do you need to have someone help you when you read instructions, pamphlets, or other written material from your doctor or pharmacy?  0.       Never  1.       Rarely  2.       Sometimes  3.       Often  4.       Always  5.       Patient declines  to respond  6.       Patient unable to respond  ------------------------------------------------------------------------------------------------------------   BIMS:  See flow sheet     -----------------------------------------------------------------------------------------------------------  CAM:  See flow sheet.  -------------------------------------------------------------------------------------------------------------  PHQ-9:  See flow sheet.   -------------------------------------------------------------------------------------------------------------  SOCIAL ISOLATION  How often do you feel lonely or isolated from those around you?  0.       Never  1.       Rarely  2.       Sometimes  3.       Often  4.       Always  5.       Patient declines to respond  6.       Patient unable to respond  -------------------------------------------------------------------------------------------------------------    BIMS: Pt scored 15 on BIMS indicating cognition intact.  PHQ-9: Pt scored 6 on PHQ-9 indicating mild depression symptoms present.   PAS: confirmation number- [id number]   Has there been a level II screen?  No  Were there any recommendations in the screen? No  If yes, will the recommendations we incorporated into the Plan of Care?  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  He was started on diuretics. Also found to have SBP, sp iv abx and RENNY.     Provider Information   Primary Care Physician:Richi Jaramillo [address] [facility] [place] [id number]   [phone]  : None reported.    Mental Health:   Diagnosis: Happy per pt.   Current Support/Services: Pt denied.  Previous Services: Pt denied.  Services Needed/Recommended: MITUL offered  Jackeline and Health Psychology services  while at [facility].     Substance Use:  Diagnosis: Pt denied all use of substances.  Current Support/Services: Pt denied.  Previous Services: Pt denied.  Services Needed/Recommended: N/A    Support System  Marital Status: single  Family support: Has siblings that he has not talked to for 20-30 years.   Other support available: [employer]Mac   Gaps in support system: Pt denied.    Personalized Care and Trauma  What other information would help us give you more personalized care or how can we help you with any past trauma?  Pt denied.    MyChart:  Do you have access to [place] to view my Baseline Care Plan? No, and doesn't want any thing written or given to him.  Just give him verbal information.       Community Resources  Current in home services: Pt stated he had HH but could not remember which agency.  He would like a different agency.   Previous services: None reported.    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   Income Source: [employer] and [employer] and property  Education: college   Financial Concerns:  Pt wanted to make sure this stay was covered.   Insurance: [facility] MEDICARE     Discharge Plan   Patient and family discharge goal: Home   Provided Education on discharge plan: YES  Patient agreeable to discharge plan:  YES  A list of Medicare Certified Facilities was provided to the patient and/or family to encourage patient choice. Based on location and rating, patient would like referrals made to: N/A  General information regarding anticipated insurance coverage and possible out of pocket cost was discussed. Patient and patient's family are aware patient may incur the cost of transportation to the facility, pending insurance payment: YES  Barriers to discharge: TBD    Discharge Recommendations   Disposition: Home   Transportation Needs: Rosemary [place]   Name of Transportation Company and Phone: N/A    Additional comments   Wants a 4 ww  with a seat.   He does NOT want a PCA.    NICHOLE Thompson   [facility], Transitional Care Unit   Social Work   [address] [place] [address], 4th [place] [address]  () [phone]

## 2024-11-18 NOTE — ORAL ONC MGMT
"Oral Chemotherapy Monitoring Program    Subjective/Objective:  Gucci Logan is a 74 year old male contacted by phone for a follow-up visit for oral chemotherapy. Adherent.    Patient admitted 10/29-11/16 for HF exacerbation, continued darolutamide while hospitalized. Now in TCU & pt hoping to go home around [place]. Continues darolutamide at TCU.    Labs today are stable. Patient endorses fatigue. No HTN. Having many other symptoms that are not necessarily related to Nubeqa (diarrhea due to many antibiotics while admitted in the hospital, edema due to HF, increased Scr due to dehydration related to diarrhea).     Patient has been doing fingers pokes daily at hospital/TCU and is using insulin. Patient does not typically check glucose and only checks A1C. He does not normally use insulin but TCU does not use oral antidiabetes medications (metformin).     Dr Plunkett told patient that he is done with docetaxel infusions but there are still some on patient's schedule. Per Dimitrios note from 10/24: \"Docetaxel will likely be discontinued after cycle 4 to allow recovery from the adverse effects of chemotherapy. Darolutamide/leuprolide will be continued thereafter.\" Sent IB to Dr Plunkett to confirm.        8/26/2024     9:00 AM 9/5/2024    12:00 PM 9/25/2024     9:00 AM 9/26/2024     8:00 AM 10/11/2024    12:00 PM 10/30/2024    11:00 AM 11/18/2024    11:00 AM   ORAL CHEMOTHERAPY   Assessment Type Initial Follow up Refill Lab Monitoring Refill Refill Refill Monthly Follow up   Diagnosis Code Prostate Cancer Prostate Cancer Prostate Cancer Prostate Cancer Prostate Cancer Prostate Cancer Prostate Cancer   Providers Dr. Dimitrios Plunkett   [facility]   Is this patient followed by the [facility] OC team? No No No No No No No   Drug Name Nubeqa (darolutamide) Nubeqa (darolutamide) Nubeqa " (darolutamide) Nubeqa (darolutamide) Nubeqa (darolutamide) Nubeqa (darolutamide) Nubeqa (darolutamide)   Dose 600 mg 600 mg 600 mg 600 mg 600 mg 600 mg 600 mg   Current Schedule BID BID BID BID BID BID BID   Cycle Details Continuous Continuous Continuous Continuous Continuous Continuous Continuous   Start Date of Last Cycle 8/14/2024         Doses missed in last 2 weeks       0   Adherence Assessment       Adherent   Adverse Effects No AE identified during assessment      Fatigue;Diarrhea;Edema;Increased Creatinine;Thrombocytopenia;Anemia   Anemia       Grade 2   Pharmacist Intervention(anemia)       No   Thrombocytopenia       Grade 2   Pharmacist Intervention(thrombocytopenia)       No   Increased Creatinine       Grade 2   Pharmacist intervention(Increased creatinine)       No   Home BPs       all BPs<140/90   Any new drug interactions?       No   Is the dose as ordered appropriate for the patient?       Yes   Has the patient missed any days of school, work, or other routine activity?       Yes   Days missed in the past month:       5 days or less   Since the last time we talked, have you been hospitalized or used the emergency room?       Yes   What medical service did you use?       Hospitalization   How many hospitalizations?       1   How many hospitalizations were related to the cancer medication?       1       Last PHQ-2 Score on record:       1/5/2024    10:07 AM 1/5/2024    10:06 AM   PHQ-2 ( 1999 [employer])   Q1: Little interest or pleasure in doing things 0  0   Q2: Feeling down, depressed or hopeless 0  0   PHQ-2 Score 0 0   Q1: Little interest or pleasure in doing things Not at all    Q2: Feeling down, depressed or hopeless Not at all    PHQ-2 Score 0        Patient-reported       Vitals:  BP:   BP Readings from Last 1 Encounters:   11/18/24 119/50     Wt Readings from Last 1 Encounters:   11/16/24 112.9 kg (248 lb 12.8 oz)     Estimated body surface area is 2.38 meters squared as calculated from the  "following:    Height as of 11/16/24: 1.803 m (5' 11\").    Weight as of 11/16/24: 112.9 kg (248 lb 12.8 oz).    Labs:  _  Result Component Current Result Ref Range   Sodium 144 (11/18/2024) 135 - 145 mmol/L     _  Result Component Current Result Ref Range   Potassium 4.5 (11/18/2024) 3.4 - 5.3 mmol/L     _  Result Component Current Result Ref Range   Calcium 8.1 (L) (11/18/2024) 8.8 - 10.4 mg/dL     _  Result Component Current Result Ref Range   Magnesium 2.1 (11/18/2024) 1.7 - 2.3 mg/dL     No results found for Phos within last 30 days.     _  Result Component Current Result Ref Range   Albumin 2.6 (L) (11/18/2024) 3.5 - 5.2 g/dL     _  Result Component Current Result Ref Range   Urea Nitrogen 43.8 (H) (11/18/2024) 8.0 - 23.0 mg/dL     _  Result Component Current Result Ref Range   Creatinine 1.49 (H) (11/18/2024) 0.67 - 1.17 mg/dL     _  Result Component Current Result Ref Range   AST 43 (11/18/2024) 0 - 45 U/L     _  Result Component Current Result Ref Range   ALT 31 (11/18/2024) 0 - 70 U/L     _  Result Component Current Result Ref Range   Bilirubin Total 0.8 (11/18/2024) <=1.2 mg/dL     _  Result Component Current Result Ref Range   WBC Count 3.6 (L) (11/18/2024) 4.0 - 11.0 10e3/uL     _  Result Component Current Result Ref Range   Hemoglobin 9.0 (L) (11/18/2024) 13.3 - 17.7 g/dL     _  Result Component Current Result Ref Range   Platelet Count 60 (L) (11/18/2024) 150 - 450 10e3/uL     No results found for ANC within last 30 days.     _  Result Component Current Result Ref Range   Absolute Neutrophils 2.9 (11/15/2024) 1.6 - 8.3 10e3/uL       Assessment/Plan:  Patient is tolerating therapy well enough to continue treatment.    Follow-Up:  12/5 labs + cass visit    Refill Due:  11/21    Patience SosaD  Oral Chemotherapy Monitoring Program  [facility]  [phone]     "

## 2024-11-19 ENCOUNTER — APPOINTMENT (OUTPATIENT)
Dept: OCCUPATIONAL THERAPY | Facility: SKILLED NURSING FACILITY | Age: 74
DRG: 314 | End: 2024-11-19
Attending: INTERNAL MEDICINE
Payer: COMMERCIAL

## 2024-11-19 ENCOUNTER — APPOINTMENT (OUTPATIENT)
Dept: PHYSICAL THERAPY | Facility: SKILLED NURSING FACILITY | Age: 74
DRG: 314 | End: 2024-11-19
Attending: INTERNAL MEDICINE
Payer: COMMERCIAL

## 2024-11-19 ENCOUNTER — PATIENT OUTREACH (OUTPATIENT)
Dept: ONCOLOGY | Facility: CLINIC | Age: 74
End: 2024-11-19
Payer: COMMERCIAL

## 2024-11-19 LAB
GLUCOSE BLDC GLUCOMTR-MCNC: 106 MG/DL (ref 70–99)
GLUCOSE BLDC GLUCOMTR-MCNC: 111 MG/DL (ref 70–99)
GLUCOSE BLDC GLUCOMTR-MCNC: 144 MG/DL (ref 70–99)

## 2024-11-19 PROCEDURE — 97535 SELF CARE MNGMENT TRAINING: CPT | Mod: GO | Performed by: OCCUPATIONAL THERAPIST

## 2024-11-19 PROCEDURE — 97110 THERAPEUTIC EXERCISES: CPT | Mod: GP

## 2024-11-19 PROCEDURE — 250N000012 HC RX MED GY IP 250 OP 636 PS 637: Performed by: STUDENT IN AN ORGANIZED HEALTH CARE EDUCATION/TRAINING PROGRAM

## 2024-11-19 PROCEDURE — 120N000009 HC R&B SNF

## 2024-11-19 PROCEDURE — 97110 THERAPEUTIC EXERCISES: CPT | Mod: GO | Performed by: OCCUPATIONAL THERAPIST

## 2024-11-19 PROCEDURE — 250N000013 HC RX MED GY IP 250 OP 250 PS 637: Performed by: STUDENT IN AN ORGANIZED HEALTH CARE EDUCATION/TRAINING PROGRAM

## 2024-11-19 PROCEDURE — 97530 THERAPEUTIC ACTIVITIES: CPT | Mod: GP

## 2024-11-19 PROCEDURE — 97530 THERAPEUTIC ACTIVITIES: CPT | Mod: GO | Performed by: OCCUPATIONAL THERAPIST

## 2024-11-19 RX ADMIN — CIPROFLOXACIN HYDROCHLORIDE 500 MG: 500 TABLET, FILM COATED ORAL at 09:47

## 2024-11-19 RX ADMIN — OXYCODONE HYDROCHLORIDE 5 MG: 5 TABLET ORAL at 12:14

## 2024-11-19 RX ADMIN — OXYCODONE HYDROCHLORIDE 5 MG: 5 TABLET ORAL at 03:24

## 2024-11-19 RX ADMIN — ACETAMINOPHEN 650 MG: 325 TABLET, FILM COATED ORAL at 12:14

## 2024-11-19 RX ADMIN — ACETAMINOPHEN 325 MG: 325 TABLET, FILM COATED ORAL at 03:24

## 2024-11-19 RX ADMIN — Medication 950 MG: at 09:47

## 2024-11-19 RX ADMIN — ASPIRIN 81 MG CHEWABLE TABLET 81 MG: 81 TABLET CHEWABLE at 09:47

## 2024-11-19 RX ADMIN — ACETAMINOPHEN 650 MG: 325 TABLET, FILM COATED ORAL at 20:12

## 2024-11-19 RX ADMIN — METOPROLOL SUCCINATE 200 MG: 200 TABLET, EXTENDED RELEASE ORAL at 09:47

## 2024-11-19 RX ADMIN — OXYCODONE HYDROCHLORIDE 10 MG: 5 TABLET ORAL at 20:12

## 2024-11-19 RX ADMIN — PREDNISONE 5 MG: 5 TABLET ORAL at 09:47

## 2024-11-19 RX ADMIN — PANTOPRAZOLE SODIUM 40 MG: 40 TABLET, DELAYED RELEASE ORAL at 06:13

## 2024-11-19 NOTE — PROGRESS NOTES
11/19/24 1000   Name of Certified Therapeutic Rec Specialist   Name of Certified Therapeutic Rec Specialist SINDI Castellano   Appointment Type   Type of Therapeutic Rec Session Therapeutic Rec Assessment   General Information   Patient Profile Review See Profile for full history and prior level of function   Daily Contact with Relatives or Friends Phone call;Visit   Treatment Plan   Assessment Brief assessment and MDS questions completed. Pt has tablet with games and has no other leisure needs. Pt is interested in seeing pet therapy dogs and volunteer to play cribbage. Will be arranged.

## 2024-11-19 NOTE — PLAN OF CARE
Goal Outcome Evaluation:      Plan of Care Reviewed With: patient    Overall Patient Progress: no change  Illness Severity: Stable  Orientation: Alert and oriented x4. Able to make needs known to staff.   Bowel & Bladder: Continent of both bowel and bladder. Voids spontaneously without difficulty.  Medications: Takes medication whole with thin liquids.  Pain: Pain managed with PRN acetaminophen (325mg) and oxycodone x1 which was effective.  Ambulation/Transfers: with stand-by assist w/ walker.  Diet: Regular diet w/ a fluid restriction of 1500m  Lines: None     Oxygen: Room air  Skin: Dressing to sacrum CDI  Infection/Isolation: No active isolations.  Safety: No concerns noted this shift.   Other: Denied chest pain, N&V, and SOB. Call-light within reach. Utilizes call-light appropriately. Continue with POC.

## 2024-11-19 NOTE — PLAN OF CARE
"Goal Outcome Evaluation:    /45 (BP Location: Right arm)   Pulse 63   Temp 98.1  F (36.7  C) (Oral)   Resp 18   Ht 1.803 m (5' 11\")   Wt 112.9 kg (248 lb 12.8 oz)   SpO2 97%   BMI 34.70 kg/m      Pt alert and oriented x4. Calm and pleasant throughout cares. Uses urinal at bedside, drain in the toilet and flushed 2x. On oral chemo meds, Continent with BM, refused Miralax at bedtime.  Mepilex on coccyx. Had good appetite for shift, on regular diet, able to take pills whole, on 1500 mL fluid restriction.. Denied any chets pain, SOB, N/V or headache. Will continue POC.       "

## 2024-11-19 NOTE — PHARMACY-MEDICATION REGIMEN REVIEW
Pharmacy Medication Regimen Review  Gucci Logan is a 74 year old male who is currently in the Transitional Care Unit.    Assessment: All medications have an appropriate indications, durations and no unnecessary use was found.    Plan:   Continue current medications/plan.    Attending provider will be sent this note for review.  If there are any emergent issues noted above, pharmacist will contact provider directly by phone.      Pharmacy will periodically review the resident's medication regimen for any PRN medications not administered in > 72 hours and discontinue them. The pharmacist will discuss gradual dose reductions of psychopharmacologic medications with interdisciplinary team on a regular basis.    Please contact pharmacy if the above does not answer specific medication questions/concerns.  Key Fonseca, PharmD, BCPS    Background:  A pharmacist has reviewed all medications and pertinent medical history today.  Medications were reviewed for appropriate use and any irregularities found are listed with recommendations.      Current Facility-Administered Medications:     - Skin Test Reading -, , Does not apply, Q21 Days, Joshua Mosley MD, Read at 11/19/24 0948    acetaminophen (TYLENOL) Suppository 650 mg, 650 mg, Rectal, Q4H PRN, Fluke, Saumya, DO    acetaminophen (TYLENOL) tablet 650 mg, 650 mg, Oral, Q4H PRN, Fluke, Saumya, DO, 325 mg at 11/19/24 0324    alteplase (CATHFLO ACTIVASE) injection 1-2 mg, 1-2 mg, Intravenous, Q2H PRN, Fluke, Saumya, DO    alum & mag hydroxide-simethicone (MAALOX) suspension 30 mL, 30 mL, Oral, Q4H PRN, Fluke, Saumya, DO    aspirin (ASA) chewable tablet 81 mg, 81 mg, Oral, Daily, Fluke, Saumya, DO, 81 mg at 11/19/24 0947    calcium carbonate (TUMS) chewable tablet 500 mg, 500 mg, Oral, Daily PRN, Fluke, Saumya, DO    calcium citrate (CITRACAL) tablet 950 mg, 950 mg, Oral, Daily, Fluke, Saumya, DO, 950 mg at 11/19/24 0947    ciprofloxacin (CIPRO) tablet 500 mg,  500 mg, Oral, Q24H VINCENT, Fluke, Saumya, DO, 500 mg at 11/19/24 0947    darolutamide (NUBEQA) tablet 600 mg, 600 mg, Oral, BID, Fluke, Saumya, DO, 600 mg at 11/19/24 0758    glucose gel 15-30 g, 15-30 g, Oral, Q15 Min PRN **OR** dextrose 50 % injection 25-50 mL, 25-50 mL, Intravenous, Q15 Min PRN **OR** glucagon injection 1 mg, 1 mg, Subcutaneous, Q15 Min PRN, Fluke, Saumya, DO    diclofenac (VOLTAREN) 1 % topical gel 4 g, 4 g, Topical, TID PRN, Fluke, Saumya, DO    famotidine (PEPCID) tablet 20 mg, 20 mg, Oral, BID PRN, Dhital, MD Joshua    heparin lock flush 10 unit/mL injection 5-10 mL, 5-10 mL, Intracatheter, Q1H PRN, Fluke, Saumya, DO    [START ON 11/26/2024] heparin lock flush 100 unit/mL injection 5-10 mL, 5-10 mL, Intracatheter, Q28 Days, Fluke, Saumya, DO    insulin aspart (NovoLOG) injection (RAPID ACTING), 1-7 Units, Subcutaneous, TID AC, Fluke, Saumya, DO, 1 Units at 11/17/24 1850    insulin aspart (NovoLOG) injection (RAPID ACTING), 1-5 Units, Subcutaneous, At Bedtime, Fluke, Saumya, DO    lidocaine (LMX4) cream, , Topical, Q1H PRN, Fluke, Saumya, DO    lidocaine 1 % 0.1-1 mL, 0.1-1 mL, Other, Q1H PRN, Fluke, Saumya, DO    medication instruction, , Does not apply, Continuous PRN, Fluke, Saumya, DO    methocarbamol (ROBAXIN) tablet 500 mg, 500 mg, Oral, Q6H PRN, Fluke, Saumya, DO    metoprolol succinate ER (TOPROL XL) 24 hr tablet 200 mg, 200 mg, Oral, Daily, Fluke, Saumya, DO, 200 mg at 11/19/24 0947    mineral oil-white petrolatum (EUCERIN/MINERIN) cream, , Topical, Q1H PRN, Joshua Mosley MD    naloxone (NARCAN) injection 0.2 mg, 0.2 mg, Intravenous, Q2 Min PRN **OR** naloxone (NARCAN) injection 0.4 mg, 0.4 mg, Intravenous, Q2 Min PRN **OR** naloxone (NARCAN) injection 0.2 mg, 0.2 mg, Intramuscular, Q2 Min PRN **OR** naloxone (NARCAN) injection 0.4 mg, 0.4 mg, Intramuscular, Q2 Min PRN, Saumya Torres DO    nitroGLYcerin (NITROSTAT) sublingual tablet 0.4 mg, 0.4 mg, Sublingual, Q5 Min  PRN, Fluke, Saumya, DO    Nurse may request from Pharmacy a change of form of medication (e.g. Liquid to tablet)., , Does not apply, Continuous PRN, Joshua Mosley MD    oxyCODONE (ROXICODONE) tablet 5-10 mg, 5-10 mg, Oral, Q6H PRN, Fluke, Saumya, DO, 5 mg at 11/19/24 0324    pantoprazole (PROTONIX) EC tablet 40 mg, 40 mg, Oral, QAM AC, Fluke, Saumya, DO, 40 mg at 11/19/24 0613    polyethylene glycol (MIRALAX) Packet 17 g, 17 g, Oral, BID, Fluevelyn, Saumya, DO    predniSONE (DELTASONE) tablet 5 mg, 5 mg, Oral, Daily, Fluke, Saumya, DO, 5 mg at 11/19/24 0947    prochlorperazine (COMPAZINE) tablet 5 mg, 5 mg, Oral, Q6H PRN, Joshua Mosley MD    senna-docusate (SENOKOT-S/PERICOLACE) 8.6-50 MG per tablet 1 tablet, 1 tablet, Oral, BID PRN **OR** senna-docusate (SENOKOT-S/PERICOLACE) 8.6-50 MG per tablet 2 tablet, 2 tablet, Oral, BID PRN, Fluke, Saumya, DO    [START ON 11/26/2024] sodium chloride (PF) 0.9% PF flush 10-20 mL, 10-20 mL, Intracatheter, Q28 Days, Fluke, Saumya, DO    sodium chloride (PF) 0.9% PF flush 10-20 mL, 10-20 mL, Intracatheter, q1 min prn, Fluke, Saumya, DO    sodium chloride (PF) 0.9% PF flush 10-20 mL, 10-20 mL, Intracatheter, Q1H PRN, Fluke, Saumya, DO    sodium chloride (PF) 0.9% PF flush 3 mL, 3 mL, Intracatheter, q1 min prn, Fluke, Saumya, DO    tuberculin injection 5 Units, 5 Units, Intradermal, Q21 Days, Joshua Mosley MD  No current outpatient prescriptions on file.

## 2024-11-19 NOTE — PLAN OF CARE
Goal Outcome Evaluation:      Plan of Care Reviewed With: patient    Overall Patient Progress: no change    Pt. alert and orientedx4. Able to make needs known.   Pt. refused to have his BG check at bedtime. Managed pain w/ Tylenol and Oxycodone, effective. Pt. on 1500ml/day Fluid restriction. Denied CP, SOB . Will continue POC.     Patient's most recent vital signs are:     Vital signs:  BP: 112/45  Temp: 98.1  HR: 63  RR: 18  SpO2: 97 %     Patient does not have new respiratory symptoms.  Patient does not have new sore throat.  Patient does not have a fever greater than 99.5.

## 2024-11-19 NOTE — CARE PLAN
AOX4, A1/W/GB,Cont of B&B.  Patient reported moderated back and Left hip pain.  PRN pain management was effective.  Will continue to monitor.

## 2024-11-19 NOTE — PROGRESS NOTES
[facility]: Cancer Care                                                                                          Patient recently discharged from the hospital and was discharged to a long-term care facility.  No further action.    Piedad Rivas RN  Cancer Care Coordinator  [facility]

## 2024-11-19 NOTE — PLAN OF CARE
Goal Outcome Evaluation:    Plan of Care Reviewed With: patient    Overall Patient Progress: no change    Outcome Evaluation: Pt's 7/10 multiple site/ general pain  comfortably managed with Tylenol 325 mg and Oxycodone 5 mg tab x 1 this shift. Using urinal independently in bed, staff empties. Did not get out of bed this shift. Pt able to shift weight or change position in bed.Reminded and encouraged to do so during encounters. On daily early weight d/t CHF until 11/23 then weekly thereafter per order. Pt refused to be weighed this shift and would like to do it when he gets up with therapy. 1st therapy is at 0815 with OT. Will inform AM shift. Pt has no c/o SOB and no s/s of respiratory issue noted at RA. Appear to be sleeping/resting between cares/ meds. Continue with plan of care.    Patient's most recent vital signs are:     Vital signs:  BP: 112/45  Temp: 98.1  HR: 63  RR: 18  SpO2: 97 %     Patient does not have new respiratory symptoms.  Patient does not have new sore throat.  Patient does not have a fever greater than 99.5.

## 2024-11-20 ENCOUNTER — APPOINTMENT (OUTPATIENT)
Dept: PHYSICAL THERAPY | Facility: SKILLED NURSING FACILITY | Age: 74
DRG: 314 | End: 2024-11-20
Attending: INTERNAL MEDICINE
Payer: COMMERCIAL

## 2024-11-20 ENCOUNTER — APPOINTMENT (OUTPATIENT)
Dept: OCCUPATIONAL THERAPY | Facility: SKILLED NURSING FACILITY | Age: 74
DRG: 314 | End: 2024-11-20
Attending: INTERNAL MEDICINE
Payer: COMMERCIAL

## 2024-11-20 LAB
BACTERIA FLD CULT: NO GROWTH
GLUCOSE BLDC GLUCOMTR-MCNC: 111 MG/DL (ref 70–99)
GLUCOSE BLDC GLUCOMTR-MCNC: 116 MG/DL (ref 70–99)
GLUCOSE BLDC GLUCOMTR-MCNC: 129 MG/DL (ref 70–99)
GLUCOSE BLDC GLUCOMTR-MCNC: 183 MG/DL (ref 70–99)
GRAM STAIN RESULT: NORMAL
GRAM STAIN RESULT: NORMAL

## 2024-11-20 PROCEDURE — 97110 THERAPEUTIC EXERCISES: CPT | Mod: GO | Performed by: OCCUPATIONAL THERAPIST

## 2024-11-20 PROCEDURE — 250N000012 HC RX MED GY IP 250 OP 636 PS 637: Performed by: STUDENT IN AN ORGANIZED HEALTH CARE EDUCATION/TRAINING PROGRAM

## 2024-11-20 PROCEDURE — 99232 SBSQ HOSP IP/OBS MODERATE 35: CPT | Performed by: INTERNAL MEDICINE

## 2024-11-20 PROCEDURE — 97110 THERAPEUTIC EXERCISES: CPT | Mod: GP

## 2024-11-20 PROCEDURE — 120N000009 HC R&B SNF

## 2024-11-20 PROCEDURE — 97535 SELF CARE MNGMENT TRAINING: CPT | Mod: GO | Performed by: OCCUPATIONAL THERAPIST

## 2024-11-20 PROCEDURE — 250N000013 HC RX MED GY IP 250 OP 250 PS 637: Performed by: STUDENT IN AN ORGANIZED HEALTH CARE EDUCATION/TRAINING PROGRAM

## 2024-11-20 RX ADMIN — CIPROFLOXACIN HYDROCHLORIDE 500 MG: 500 TABLET, FILM COATED ORAL at 09:39

## 2024-11-20 RX ADMIN — ACETAMINOPHEN 650 MG: 325 TABLET, FILM COATED ORAL at 22:16

## 2024-11-20 RX ADMIN — OXYCODONE HYDROCHLORIDE 10 MG: 5 TABLET ORAL at 15:52

## 2024-11-20 RX ADMIN — PANTOPRAZOLE SODIUM 40 MG: 40 TABLET, DELAYED RELEASE ORAL at 09:39

## 2024-11-20 RX ADMIN — METOPROLOL SUCCINATE 200 MG: 200 TABLET, EXTENDED RELEASE ORAL at 09:39

## 2024-11-20 RX ADMIN — Medication 950 MG: at 09:39

## 2024-11-20 RX ADMIN — ACETAMINOPHEN 650 MG: 325 TABLET, FILM COATED ORAL at 15:52

## 2024-11-20 RX ADMIN — ASPIRIN 81 MG CHEWABLE TABLET 81 MG: 81 TABLET CHEWABLE at 09:39

## 2024-11-20 RX ADMIN — OXYCODONE HYDROCHLORIDE 10 MG: 5 TABLET ORAL at 22:16

## 2024-11-20 RX ADMIN — PREDNISONE 5 MG: 5 TABLET ORAL at 09:39

## 2024-11-20 NOTE — PROGRESS NOTES
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 He was started on diuretics.   Patient was also found to have SBP and received antibiotics. He also was found with RENNY and diuretic was held.   Transferred to TCU for ongoing rehab needs.      Today's updates 11/20/24  -No acute events overnight.  Patient was pleasant.  -Patient noticed increased abdominal girth and feels more bloated.  Patient has a paracentesis scheduled for tomorrow.  -Cr 1.31 this morning, trending down.     Physical deconditioning:   -Continue working with physical therapy and Occupational Therapy.     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   Ascites.    Longstanding hx of HCM, recently established care w/ [facility] for HF. Has been struggling w/ worsening BLE edema, increased abdominal distension (weeping ascites), from poorly controlled hypervolemia. PTA diuretic regimen w/ Spironolactone was deemed inadequate, admitted for more aggressive titration of diuretic regimen w/ close cardiac monitoring. Started on Bumex drip here by Cards. Repeat ECHO 10/30/24 showing hyperkinetic w/ EF 65-70%, dynamic outflow tract obstruction, peak gradient 119mmHg at rest, grade II moderate diastolic dysfunction - on most recent echo 11/8 this gradient was lower (80) indicative of probable over-estimation on prior.  Transferred to Medicine service 10/31 given a diagnosis of SBP from ascites.  He was aggressively diuresed at the beginning of his hospitalization but subsequent developed acute kidney injury and borderline BP. His metoprolol was reduced early in his admission and resumed at home dose on 11/9.  His shortness of  breath improved after restarting his metoprolol.  -His prior to admission valsartan was held during admission due to low blood pressures and dizziness.    VQ scan from 11/11/24 negative      - Continue metoprolol succinate 200 mg daily  - Continue aspirin 81 mg daily.   - Currently the patient is off diuretics due to RENNY.  Renal function is improving at this time.  I will restart gentle diuresis and continue monitoring renal function.  - Continue holding Valsartan   - Avoid vasodilators  - Ranolazine was stopped d/t no improvement of his symptoms  - Follow-up daily wt. I/o. Frequent bmp. Monitor vitals.   - Monitor lytes: keep K >4, Mg >2. RN protocol prn  - Lymphedema wraps  - Elevate legs.   - He is already being coordinated for myosin inhibitors as an outpatient and he will follow up with Dr. Gonsales on an outpatient basis.      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    Primary Hepatologist: Dr. Renteria   - Continue cipro for sbp ppx  - Therapeutic paracentesis tomorrow  - Outpatient liver team  - Monitor LFT     RENNY, suspect prerenal  CKD Stage III  Each time diuretic has been ordered he has had a significant increase in his creatinine.   - Holding diuresis, consider starting him on maintenance dosing of diuretic once his creatinine has improved at TCU.  - follow-up bmp, I.o.   - avoid nephrotoxics.      Anemia of chronic disease, + possible small volume intraperitoneal bleed following paracentesis  BRBPR 11/7  Hx of Melena.   During admission 11/10: hgb 6.8. given 1 U PRBC. He had a likely hemorrhoidal bleed given presence of internal hemorrhoids on rectal exam . GI  preformed EGD on 11/11/24 which showed grade I varices with no bleeding and no stigmata of recent bleeding were found in the lower third of the esophagus.  Mild portal  hypertensive gastropathy was found in the gastric fundus and in the gastric body.   -Cardiology would like hemoglobin >8 if light headed and this should be considered at TCU.   -Monitor cbc.      Metastatic Prostate Cancer  Chronic Pancytopenia   Initially diagnosed earlier this year incidentally following a fall at home, fracturing his L femur. ORIF 05/24/24 and curretage samples c/w adenocarcinoma of prostate origin. Has since followed w/ Oncology as an OP, last saw 10/24/24. PTA on Daralutamide BID, Docetaxel (C4D1 was on 10/24/24) and Leuprolide [id number] (last 9/5/24). Also gets Neulasta, last 10/25. Has chronic pancytopenia, likely d/t multiple comorbidities (cirrhosis, CKD) and iatrogenic (chemotherapy). Baseline hgb ~8-9 recently.   - Oncology consulted here 10/31 and recommended to continue Daralutamide.   - continue pta prednisone. 5 mg daily.   - Oncology outpt follow-up.      Type 2 Diabetes Mellitus, non-insulin-dependent, well-controlled  Last A1c 5.8% on 10/29/24.   HOLDING PTA Metformin given RENNY  Sliding scale insulin   Monitor BG    Recent Labs   Lab 11/21/24  1309 11/21/24  0656 11/20/24  2150 11/20/24  1801 11/20/24  1205 11/20/24  0821   * 98 183* 129* 116* 111*           Diet: Regular Diet Adult  Fluid restriction 1500 ML FLUID  Snacks/Supplements Adult: Special K Bar; Between Meals  Snacks/Supplements Adult: Other; If pt would like a snack or suppelment, please send; With Meals    DVT Prophylaxis: Pneumatic Compression Devices and Anti-embolisim stockings (TEDs)  Rankin Catheter: Not present  Lines: None     Cardiac Monitoring: None  Code Status: Full Code      Clinically Significant Risk Factors               # Hypoalbuminemia: Lowest albumin = 2.6 g/dL at 11/18/2024  6:41 AM, will monitor as appropriate     # Hypertension: Noted on problem list            # Obesity: Estimated body mass index is 35.33 kg/m  as calculated from the following:    Height as of this encounter: 1.803 m (5'  "11\").    Weight as of this encounter: 114.9 kg (253 lb 4.8 oz)., PRESENT ON ADMISSION     # Financial/Environmental Concerns:           Social Drivers of Health    Tobacco Use: Medium Risk (11/15/2024)    Patient History     Smoking Tobacco Use: Former     Smokeless Tobacco Use: Never     Passive Exposure: Never          Disposition Plan     Medically Ready for Discharge: Anticipated in 5+ Days             Heber Marquez MD  Hospitalist Service  [facility] [place]  Securely message with [employer] (more info)  Text page via [employer] Paging/Directory   ______________________________________________________________________    Interval History     No acute events overnight.  Patient was pleasant.    Physical Exam   Vital Signs: Temp: 97.7  F (36.5  C) Temp src: Oral BP: 136/54 Pulse: 63   Resp: 18 SpO2: 99 % O2 Device: None (Room air)    Weight: 253 lbs 4.8 oz    General Appearance:  Awake, interactive, NAD, pleasant.   HEENT: AT/NC, Anicteric, Moist MM  Respiratory: Normal work of breathing. CTA BL.   Cardiovascular: S1 S2 Regular, systolic murmur.   GI/Abd: Abdomen looks distended, soft, positive bowel sounds, no tenderness to palpation, no rebound or guarding.  Extremities: BLE 2+ pedal edema.  Neuro: AO x 4, Grossly non focal.   Skin: No acute rash on exposed areas.   Psychiatry: Stable mood.     Medical Decision Making       45 MINUTES SPENT BY ME on the date of service doing chart review, history, exam, documentation & further activities per the note.      Data     I have personally reviewed the following data over the past 24 hrs:    2.9 (L)  \   8.3 (L)   / 55 (L)     140 109 (H) 40.8 (H) /  125 (H)   4.0 24 1.31 (H) \       Imaging results reviewed over the past 24 hrs:   No results found for this or any previous visit (from the past 24 hours).  "

## 2024-11-20 NOTE — PLAN OF CARE
Goal Outcome Evaluation:      Plan of Care Reviewed With: patient    Overall Patient Progress: improvingOverall Patient Progress: improving     Overall Pt had no acute issue this shift. Pt is alert and oriented x 4. Able to make needs known. Denied having discomfort this shift. Blood sugar 144@ bedtime. Requested and received PRN oxycodone 10 mg for pain control. Appears comfortably sleeping at this time. Will continue with POC

## 2024-11-20 NOTE — PLAN OF CARE
Goal Outcome Evaluation:    Plan of Care Reviewed With: patient    Overall Patient Progress: no change    Outcome Evaluation: Pt has no c/o pain or discomfort so far this shift. Verbalized plan to take some pain meds when its due and/or he is awake but has been sleeping/resting well tonight. Pt reported coccyx dressing not replaced after shower yesterday. Coccyx assessed- Previous multiple  wound area (pictured by  per [facility]OC[facility] 11/7) has epithelialized with new skin noted. Continue to have blanchable redness. Sacral mepilex applied for protection and per pt request. Reminded and encouraged to do change position or whift weight during. Using urinal in bed, staff empties. FR 1.5 ml observed. Has CHF- refused early weight until he gets up for the day per NA. Was upset for being asked so early in the morning although pt was awake watching TV when staff  asked to get his weight. Will inform AM staff. Pt has no c/o SOB and no s/s of respiratory issue noted at RA. Appear to be sleeping/resting between cares/ meds. Continue with plan of care.    0700: Pt sleeping soundly. Protonix not given  and rescheduled for 0800 for this AM only.     Patient's most recent vital signs are:     Vital signs:  BP: 120/48  Temp: 97.7  HR: 65  RR: 18  SpO2: 97 %     Patient does not have new respiratory symptoms.  Patient does not have new sore throat.  Patient does not have a fever greater than 99.5.

## 2024-11-20 NOTE — PROGRESS NOTES
MDS Pain Assessment    The following is the pain interview as conducted by the TCU RN caring for the patient on November 20, 2024. This assessment is required by the [facility] for all patients in [place] SN[facility] (Skilled Nursing Facilities).     . Pain Presence  Have you had pain or hurting at any time in the last 5 days?   1. Yes    . Pain Frequency  How much of the time have you experienced pain or hurting over the last 5 days?   3. Frequently    . Pain Effect on Sleep  Over the past 5 days, how much of the time has pain made it hard for you to sleep at night?   3. Frequently    . Pain Interference with Therapy Activities  Over the past 5 days, how often have you limited your participation in rehabilitation therapy sessions due to pain?   3. Frequently    . Pain Interference with Day-to-Day Activities  Over the past 5 days, have you limited your day-to-day activities (excluding rehabilitation therapy sessions) because of pain?  3. Frequently    . Pain intensity   Numeric Rating Scale (00-10): Please rate your worst pain over the last 5 days on a zero to ten scale, with zero being no pain and ten as the worst pain you can imagine. 10

## 2024-11-21 ENCOUNTER — APPOINTMENT (OUTPATIENT)
Dept: PHYSICAL THERAPY | Facility: SKILLED NURSING FACILITY | Age: 74
DRG: 314 | End: 2024-11-21
Attending: INTERNAL MEDICINE
Payer: COMMERCIAL

## 2024-11-21 ENCOUNTER — APPOINTMENT (OUTPATIENT)
Dept: OCCUPATIONAL THERAPY | Facility: SKILLED NURSING FACILITY | Age: 74
DRG: 314 | End: 2024-11-21
Attending: INTERNAL MEDICINE
Payer: COMMERCIAL

## 2024-11-21 VITALS
WEIGHT: 253.3 LBS | BODY MASS INDEX: 35.46 KG/M2 | RESPIRATION RATE: 18 BRPM | TEMPERATURE: 98.2 F | OXYGEN SATURATION: 97 % | HEIGHT: 71 IN | HEART RATE: 61 BPM | DIASTOLIC BLOOD PRESSURE: 47 MMHG | SYSTOLIC BLOOD PRESSURE: 120 MMHG

## 2024-11-21 DIAGNOSIS — Z51.11 ENCOUNTER FOR ANTINEOPLASTIC CHEMOTHERAPY: ICD-10-CM

## 2024-11-21 DIAGNOSIS — C77.8 MALIGNANT NEOPLASM METASTATIC TO LYMPH NODES OF MULTIPLE SITES (H): Primary | ICD-10-CM

## 2024-11-21 DIAGNOSIS — C79.51 METASTASIS TO BONE (H): ICD-10-CM

## 2024-11-21 DIAGNOSIS — C61 PROSTATE CANCER (H): ICD-10-CM

## 2024-11-21 LAB
ANION GAP SERPL CALCULATED.3IONS-SCNC: 7 MMOL/L (ref 7–15)
BUN SERPL-MCNC: 40.8 MG/DL (ref 8–23)
CALCIUM SERPL-MCNC: 7.9 MG/DL (ref 8.8–10.4)
CHLORIDE SERPL-SCNC: 109 MMOL/L (ref 98–107)
CREAT SERPL-MCNC: 1.31 MG/DL (ref 0.67–1.17)
EGFRCR SERPLBLD CKD-EPI 2021: 57 ML/MIN/1.73M2
ERYTHROCYTE [DISTWIDTH] IN BLOOD BY AUTOMATED COUNT: 19.8 % (ref 10–15)
GLUCOSE BLDC GLUCOMTR-MCNC: 125 MG/DL (ref 70–99)
GLUCOSE BLDC GLUCOMTR-MCNC: 197 MG/DL (ref 70–99)
GLUCOSE SERPL-MCNC: 98 MG/DL (ref 70–99)
HCO3 SERPL-SCNC: 24 MMOL/L (ref 22–29)
HCT VFR BLD AUTO: 26.6 % (ref 40–53)
HGB BLD-MCNC: 8.3 G/DL (ref 13.3–17.7)
MCH RBC QN AUTO: 33.1 PG (ref 26.5–33)
MCHC RBC AUTO-ENTMCNC: 31.2 G/DL (ref 31.5–36.5)
MCV RBC AUTO: 106 FL (ref 78–100)
PLATELET # BLD AUTO: 55 10E3/UL (ref 150–450)
POTASSIUM SERPL-SCNC: 4 MMOL/L (ref 3.4–5.3)
RBC # BLD AUTO: 2.51 10E6/UL (ref 4.4–5.9)
SODIUM SERPL-SCNC: 140 MMOL/L (ref 135–145)
WBC # BLD AUTO: 2.9 10E3/UL (ref 4–11)

## 2024-11-21 PROCEDURE — 85027 COMPLETE CBC AUTOMATED: CPT | Performed by: INTERNAL MEDICINE

## 2024-11-21 PROCEDURE — 250N000013 HC RX MED GY IP 250 OP 250 PS 637: Performed by: STUDENT IN AN ORGANIZED HEALTH CARE EDUCATION/TRAINING PROGRAM

## 2024-11-21 PROCEDURE — 120N000009 HC R&B SNF

## 2024-11-21 PROCEDURE — 250N000012 HC RX MED GY IP 250 OP 636 PS 637: Performed by: STUDENT IN AN ORGANIZED HEALTH CARE EDUCATION/TRAINING PROGRAM

## 2024-11-21 PROCEDURE — 97110 THERAPEUTIC EXERCISES: CPT | Mod: GP

## 2024-11-21 PROCEDURE — 97535 SELF CARE MNGMENT TRAINING: CPT | Mod: GO

## 2024-11-21 PROCEDURE — 80048 BASIC METABOLIC PNL TOTAL CA: CPT | Performed by: INTERNAL MEDICINE

## 2024-11-21 PROCEDURE — 97110 THERAPEUTIC EXERCISES: CPT | Mod: GO

## 2024-11-21 PROCEDURE — 36415 COLL VENOUS BLD VENIPUNCTURE: CPT | Performed by: INTERNAL MEDICINE

## 2024-11-21 RX ORDER — FUROSEMIDE 40 MG/1
40 TABLET ORAL DAILY
Status: DISCONTINUED | OUTPATIENT
Start: 2024-11-22 | End: 2024-11-25

## 2024-11-21 RX ORDER — FUROSEMIDE 40 MG/1
40 TABLET ORAL DAILY
Status: DISCONTINUED | OUTPATIENT
Start: 2024-11-22 | End: 2024-11-21

## 2024-11-21 RX ADMIN — OXYCODONE HYDROCHLORIDE 10 MG: 5 TABLET ORAL at 04:27

## 2024-11-21 RX ADMIN — INSULIN ASPART 2 UNITS: 100 INJECTION, SOLUTION INTRAVENOUS; SUBCUTANEOUS at 18:11

## 2024-11-21 RX ADMIN — OXYCODONE HYDROCHLORIDE 10 MG: 5 TABLET ORAL at 22:37

## 2024-11-21 RX ADMIN — METOPROLOL SUCCINATE 200 MG: 200 TABLET, EXTENDED RELEASE ORAL at 08:23

## 2024-11-21 RX ADMIN — ASPIRIN 81 MG CHEWABLE TABLET 81 MG: 81 TABLET CHEWABLE at 08:23

## 2024-11-21 RX ADMIN — PANTOPRAZOLE SODIUM 40 MG: 40 TABLET, DELAYED RELEASE ORAL at 07:00

## 2024-11-21 RX ADMIN — Medication 950 MG: at 10:57

## 2024-11-21 RX ADMIN — PREDNISONE 5 MG: 5 TABLET ORAL at 08:23

## 2024-11-21 RX ADMIN — CIPROFLOXACIN HYDROCHLORIDE 500 MG: 500 TABLET, FILM COATED ORAL at 08:29

## 2024-11-21 RX ADMIN — ACETAMINOPHEN 650 MG: 325 TABLET, FILM COATED ORAL at 22:37

## 2024-11-21 RX ADMIN — OXYCODONE HYDROCHLORIDE 10 MG: 5 TABLET ORAL at 17:06

## 2024-11-21 NOTE — PLAN OF CARE
"Goal Outcome Evaluation:       Plan of Care Reviewed With: patient     Overall Patient Progress: improvingOverall Patient Progress: improving     FOCUS/GOAL     Bowel management, Bladder management, Nutrition/Feeding/Swallowing precautions, Mobility, Skin integrity, and Safety management           VS: /52 (BP Location: Right arm)   Pulse 60   Temp 98.6  F (37  C) (Oral)   Resp 18   Ht 1.803 m (5' 11\")   Wt 114.9 kg (253 lb 4.8 oz)   SpO2 99%   BMI 35.33 kg/m       O2: 99% RA   Output: Adequate urinal at bedside   Last BM: 11/202/204 continent   Activity: SBA - independent in room   Skin: BLE tubi  mepilex sacral protection   Pain: Managed with PRN Tylenol Q4 , PRN Oxycodone Q6    CMS: Denies SOB, chest pain, headache, numbness or tingling   Dressing: Sacral mepilex CDI   Diet: 1500 FR, regular, diet   LDA: None- R chest port no accessed   Equipment: 4 wheel walker, urinal    Plan: Scheduled paracentesis 11/20 anticipate discharge 11/29   Additional Info:              "

## 2024-11-21 NOTE — PLAN OF CARE
Goal Outcome Evaluation:      Plan of Care Reviewed With: patient    Overall Patient Progress: improvingOverall Patient Progress: improving     Overall pt had no acute issue this shift. Pt is alert and oriented x 4. Able to make need known. Pt requires assist of 1 with transfers using a walker. Pt is continent of both bowel and bladder. Uses urinal @ bedside with staff assistance to empty. Pt is on 1.5L fluid restriction. Drank less than 300 Ml through out the shift. Appears comfortable at this time. Will continue with POC

## 2024-11-21 NOTE — CARE PLAN
RN:  1500. Denies need for pain meds. Tubi  in place. Bruising and abrasions BLE fading. Makes needs known. BG checks continue.  Blanchable redness coccyx. Mod I in room without problems. Awaiting paracentesis tomorrow.     Patient's most recent vital signs are:     Vital signs:  BP: 120/47  Temp: 98.2  HR: 61  RR: 18  SpO2: 97 %     Patient does not have new respiratory symptoms.  Patient does not have new sore throat.  Patient does not have a fever greater than 99.5.

## 2024-11-22 ENCOUNTER — ANCILLARY PROCEDURE (OUTPATIENT)
Dept: ULTRASOUND IMAGING | Facility: CLINIC | Age: 74
End: 2024-11-22
Attending: STUDENT IN AN ORGANIZED HEALTH CARE EDUCATION/TRAINING PROGRAM
Payer: COMMERCIAL

## 2024-11-22 ENCOUNTER — MEDICAL CORRESPONDENCE (OUTPATIENT)
Dept: HEALTH INFORMATION MANAGEMENT | Facility: CLINIC | Age: 74
End: 2024-11-22

## 2024-11-22 ENCOUNTER — APPOINTMENT (OUTPATIENT)
Dept: PHYSICAL THERAPY | Facility: SKILLED NURSING FACILITY | Age: 74
DRG: 314 | End: 2024-11-22
Attending: INTERNAL MEDICINE
Payer: COMMERCIAL

## 2024-11-22 ENCOUNTER — APPOINTMENT (OUTPATIENT)
Dept: OCCUPATIONAL THERAPY | Facility: SKILLED NURSING FACILITY | Age: 74
DRG: 314 | End: 2024-11-22
Attending: INTERNAL MEDICINE
Payer: COMMERCIAL

## 2024-11-22 LAB
GLUCOSE BLDC GLUCOMTR-MCNC: 121 MG/DL (ref 70–99)
GLUCOSE BLDC GLUCOMTR-MCNC: 131 MG/DL (ref 70–99)
GLUCOSE BLDC GLUCOMTR-MCNC: 134 MG/DL (ref 70–99)
GLUCOSE BLDC GLUCOMTR-MCNC: 173 MG/DL (ref 70–99)

## 2024-11-22 PROCEDURE — 97110 THERAPEUTIC EXERCISES: CPT | Mod: GO | Performed by: OCCUPATIONAL THERAPIST

## 2024-11-22 PROCEDURE — 49083 ABD PARACENTESIS W/IMAGING: CPT | Performed by: STUDENT IN AN ORGANIZED HEALTH CARE EDUCATION/TRAINING PROGRAM

## 2024-11-22 PROCEDURE — 250N000013 HC RX MED GY IP 250 OP 250 PS 637: Performed by: INTERNAL MEDICINE

## 2024-11-22 PROCEDURE — 250N000013 HC RX MED GY IP 250 OP 250 PS 637: Performed by: STUDENT IN AN ORGANIZED HEALTH CARE EDUCATION/TRAINING PROGRAM

## 2024-11-22 PROCEDURE — 0W9G3ZZ DRAINAGE OF PERITONEAL CAVITY, PERCUTANEOUS APPROACH: ICD-10-PCS | Performed by: STUDENT IN AN ORGANIZED HEALTH CARE EDUCATION/TRAINING PROGRAM

## 2024-11-22 PROCEDURE — 250N000012 HC RX MED GY IP 250 OP 636 PS 637: Performed by: STUDENT IN AN ORGANIZED HEALTH CARE EDUCATION/TRAINING PROGRAM

## 2024-11-22 PROCEDURE — 120N000009 HC R&B SNF

## 2024-11-22 PROCEDURE — 97530 THERAPEUTIC ACTIVITIES: CPT | Mod: GP

## 2024-11-22 PROCEDURE — 49083 ABD PARACENTESIS W/IMAGING: CPT

## 2024-11-22 PROCEDURE — 022N000001 HC SNF RUG CODE OPNP

## 2024-11-22 PROCEDURE — 97110 THERAPEUTIC EXERCISES: CPT | Mod: GP

## 2024-11-22 RX ORDER — POLYETHYLENE GLYCOL 3350 17 G/17G
17 POWDER, FOR SOLUTION ORAL DAILY PRN
Status: DISCONTINUED | OUTPATIENT
Start: 2024-11-22 | End: 2024-12-06 | Stop reason: HOSPADM

## 2024-11-22 RX ADMIN — ASPIRIN 81 MG CHEWABLE TABLET 81 MG: 81 TABLET CHEWABLE at 09:28

## 2024-11-22 RX ADMIN — Medication 950 MG: at 09:30

## 2024-11-22 RX ADMIN — FUROSEMIDE 40 MG: 40 TABLET ORAL at 09:30

## 2024-11-22 RX ADMIN — PREDNISONE 5 MG: 5 TABLET ORAL at 09:28

## 2024-11-22 RX ADMIN — PANTOPRAZOLE SODIUM 40 MG: 40 TABLET, DELAYED RELEASE ORAL at 07:00

## 2024-11-22 RX ADMIN — METOPROLOL SUCCINATE 200 MG: 200 TABLET, EXTENDED RELEASE ORAL at 09:24

## 2024-11-22 RX ADMIN — CIPROFLOXACIN HYDROCHLORIDE 500 MG: 500 TABLET, FILM COATED ORAL at 09:29

## 2024-11-22 RX ADMIN — ACETAMINOPHEN 650 MG: 325 TABLET, FILM COATED ORAL at 18:11

## 2024-11-22 RX ADMIN — INSULIN ASPART 1 UNITS: 100 INJECTION, SOLUTION INTRAVENOUS; SUBCUTANEOUS at 17:03

## 2024-11-22 RX ADMIN — OXYCODONE HYDROCHLORIDE 10 MG: 5 TABLET ORAL at 18:11

## 2024-11-22 NOTE — PROGRESS NOTES
CLINICAL NUTRITION SERVICES - ASSESSMENT NOTE     Nutrition Prescription    RECOMMENDATIONS FOR MDs/PROVIDERS TO ORDER:  None    Malnutrition Status:    Moderate malnutrition in the context of acute illness    Recommendations already ordered by Registered Dietitian (RD):  Encouraged intakes, use of supplements    Future/Additional Recommendations:  Monitor labs, intakes, and weight trends.     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  He was started on diuretics. Also found to have SBP, sp iv abx and RENNY.     FINDINGS  RD met with pt in room. Pt reports eating well, no GI complaints aside from consistent nausea from chemo medications. Pt states he does not always eat his special K bar and prefers granola bars from [employer]. Pt had [employer] bag in room. Pt denies issues chewing/swallowing. Commended efforts, explained role of RD. Pt reports getting soup that is half full Discussed that pt is only able to receive half his fluid restriction from the kitchen which includes soup.     Pt had paracentesis today, 2 L taken off.     CURRENT NUTRITION ORDERS  Diet: 1500 mL Fluid Restriction and Regular  Snacks/supplements: Special K bar BID + PRN supplements    Intake: % of documented meals.     Pt ordering (on average) 2400 kcal and 74 g protein per day per [employer]. Supplements add 180-360 kcal and 12-24 g protein daily. With documented and reported intakes, pt is likely meeting ~70% minimum energy and 50-60% protein needs.- possible that pt has some food from outside adding to this.     LABS   11/15/24 04:22 11/16/24 03:43 11/18/24 06:41 11/21/24 06:56   Sodium 143 142 144 140   Potassium 4.2 4.0 4.5 4.0   Urea Nitrogen 45.9 (H) 43.6 (H) 43.8 (H) 40.8 (H)  "  Creatinine 1.62 (H) 1.51 (H) 1.49 (H) 1.31 (H)   Magnesium 2.4 (H) 2.3 2.1    Alkaline Phosphatase 248 (H) 221 (H) 221 (H)    ALT 44 37 31    AST 69 (H) 55 (H) 43    Glucose 172 (H) 126 (H) 126 (H) 98     MEDICATIONS  Medications reviewed: Citracal, Ciipro, insulin (novolog), protonix, prednisone,  oxycodone PRN  IV Fluids over last 7 days?  No    ANTHROPOMETRICS  Height: 180.3 cm (5' 11\")  Most Recent Weight: 114.9 kg (253 lb 4.8 oz)    IBW: 78.2 kg (147% IBW)  BMI: Obesity Grade II BMI 35-39.9  Weight History: Pt with large weight fluctuations likely fluid related, however at similar weight to 6 months ago. Unsure of accuracy of weights 10/24.   11/20/24 114.9 kg (253 lb 4.8 oz)   11/16/24 112.9 kg (248 lb 12.8 oz)    11/15/24 112.1 kg (247 lb 1.6 oz)   10/24/24 123.4 kg (272 lb 1.6 oz)   10/24/24 104 kg (229 lb 4.5 oz)   10/03/24 115.1 kg (253 lb 12.8 oz)   09/24/24 104.8 kg (231 lb)   09/23/24 96.6 kg (213 lb)   09/05/24 104.8 kg (231 lb 0.7 oz)   08/27/24 104.8 kg (231 lb)   08/19/24 104.8 kg (231 lb)   08/14/24 106.5 kg (234 lb 11.2 oz)   07/18/24 104.8 kg (231 lb 0.7 oz)   07/08/24 104.8 kg (231 lb)   06/26/24 107.1 kg (236 lb 3.2 oz)   05/27/24 114.9 kg (253 lb 4.8 oz)   05/17/24 112 kg (247 lb)   04/30/24 115 kg (253 lb 8 oz)   04/16/24 117.6 kg (259 lb 4.8 oz)   03/05/24 116.6 kg (257 lb)   01/23/24 110.7 kg (244 lb 2 oz)     Dosing Weight: 87 kg - ABW using most recent weight and IBW of 78.2 kg.     ASSESSED NUTRITION NEEDS  Estimated Energy Needs: 1543-8085 kcals/day 25 - 30 kcal/kg  Justification: Maintenance   Estimated Protein Needs:  grams protein/day (1 - 1.2 grams of pro/kg)  Justification: CKD vs increased needs  Estimated Fluid Needs: 1500 ml fluid restriction    PHYSICAL FINDINGS  See malnutrition section below.   Dentition: Patient denies pain/difficulty chewing/swallowing, no dentures    MALNUTRITION  % Intake: < 75% for  >7 days (moderate)  % Weight Loss: Difficult to assess with " fluid status changes  Subcutaneous Fat Loss: None observed  Muscle Loss: Global mild  Fluid Accumulation/Edema: Mild  Malnutrition Diagnosis: Moderate malnutrition in the context of acute illness    NUTRITION DIAGNOSIS  Inadequate oral intake related to decreased appetite, nausea, menu fatigue, as evidenced by pt report.     INTERVENTIONS  Implementation  Nutrition education for nutrition relationship to health/disease   Continue current supplements     Goals  Patient to consume % of nutritionally adequate meal trays TID, or the equivalent with supplements/snacks.      Monitoring/Evaluation  Progress toward goals will be monitored and evaluated per protocol.    Marta Wheatley MS, RDN, LD  TCU/OB/Ortho Clinical Dietitian  Available via phone and Vocera  Phone: [phone]  Vocera: TCU Clinical Dietitian   Weekend/Holiday Vocera: Weekend Holiday Clinical Dietitian [Multi Site Groups]

## 2024-11-22 NOTE — PROGRESS NOTES
"   11/22/24 2776   Appointment Info   Signing Clinician's Name / Credentials (PT) Kaylen Nash PTA   PT Assistant Visit Number 3   Therapeutic Procedure/Exercise   Ther. Procedure: strength, endurance, ROM, flexibillity Minutes (67547) 17   Symptoms Noted During/After Treatment fatigue   Treatment Detail/Skilled Intervention PT: focus on BLE strength and CV endurance w/gait activity and NuStep. Pt very particular and likes to control session, stating \"you can't talk to me when I am walking so that I can focus. I will walk and do stairs\". NuStep w/skilled set up, dependent for lifting LE onto/off of foot pedals in safe manner, seat at 10, UE at 8, L7 for 12 min, repeated cues for keeping >30spm for increased CV endurance challenge. added washclothes under L foot for improved comfort. Pt declined trial of higher intensity intervals today. Occasional rest breaks w/pain twinges at R hip. Pt able to complete.2 miles, 304 total steps, avg 24 spm, 1.8 METS. Educated Pt on goal of increasing METS for improved LE strength and CV/endurance. Pt refuses to amb across carpet and insists on gait activity on hard surface floor only: amb 50ft x 1, FWW, CGA with w/c brought behind for safety. Tends to utilize a slow/shuffling gait pattern, noting increased edema in RLE.   Therapeutic Activity   Therapeutic Activities: dynamic activities to improve functional performance Minutes (19283) 25   Symptoms Noted During/After Treatment Fatigue   Treatment Detail/Skilled Intervention PT: focus on functional transfers and stairs. see gg....Pt talkative and frequently needing redirection to stay on task.   PT Discharge Planning   PT Plan PT: gentle massage at scar tissue at L hip? find and fit new w/c that Pt can self propel? amb bouts w/4WW, work on safe furniture approach, standing LE strength   Physical Therapy Time and Intention   Timed Code Treatment Minutes 42   Total Session Time (sum of timed and untimed services) 42   Post Acute " "Settings Only   What unit is patient on? TCU   PT - Transitional Care Unit Time   Individual Time (minutes) - PT 42   Group Time (minutes) - PT 9   Concurrent Time (minutes) - PT 9   Co-Treatment Time (minutes) - PT 9   TCU Total Session Time (minutes) - PT 69   TCU Daily Total Session Time   PT TCU Daily Total Session Time 69   Rehab TCU Daily Total Session Time 69   Transfers: Sit to Stand   Patient Performance Steadying Assist   Staff Performance One person assist (one person physical assist)   Equipment Used Rolling walker   Describe Performance PT: mass practice of STS from w/c, FWW or 4WW, CGA with set up assist and frequent cues for safety. Pt is resistant stating \"this is how I do it\".   Transfers: Chair/Bed transfers   Patient Performance Steadying Assist   Staff Performance One person assist (one person physical assist)   Equipment Used Rolling walker   Describe Performance PT: mass practice of SPT with and w/o AD, CGA. Pt refuses AD with pivot from w/c <> NuStep, heavy reliance on BUE, poor mechanics/safety awareness and resistent to cues.   Walk 10 Feet - Ability to walk once standing   Describe Performance PT: see TE       "

## 2024-11-22 NOTE — PLAN OF CARE
"Goal Outcome Evaluation:      Plan of Care Reviewed With: patient    Overall Patient Progress: no changeOverall Patient Progress: no change       VS: /51 (BP Location: Left arm)   Pulse 63   Temp 97.9  F (36.6  C) (Oral)   Resp 18   Ht 1.803 m (5' 11\")   Wt 116.2 kg (256 lb 2.8 oz)   SpO2 99%   BMI 35.73 kg/m       O2: 99%   Output: Continent of BB, urinal at bedside   Last BM: 11/20   Activity: Ax1 w/ walker and GB, MOD-I in room (on whiteboard)   Skin: Mepilex on sacrum but is healed   Pain: Managed w/ PRN Oxy and Tylenol   CMS: Aox4, forgetful   Dressing: Mepilex on sacrum   Diet: Regular   LDA: None   Equipment: Walker, GB, call light   Plan: Continue POC   Additional Info: Pt alert and oriented, able to make needs known and use call light. Dinner , 1un given. Bedtime , no insulin. Pt c/o about BG checks and insulin, says he doesn't do insulin at home, just Metformin but he is \"tired of arguing about it.\" Whiteboard says MOD-I but pt required Ax1 to get into bed. Friend visited tonight. Denies SOB, CP, N/V. No acute issues this shift.             "

## 2024-11-22 NOTE — PLAN OF CARE
Pt AOx4. Able to make needs known.  No acute issues overnight. Maintained on FR 1,500ml.  Pt expected to have Paracentesis today.   Denies CP and SOB. Call light w/in reach.   Continue POC. Patient out for paracentesis.

## 2024-11-22 NOTE — PROGRESS NOTES
SHIFT [id number]    Pt is a/o x4. Pt transfers Mod I in room and SBA in hallways. No LDA's. LBM 11/21. Diet is regular, FR 1500 ml/day. BLE tubi . Skin- mepilex on sacral healed. Pain is managed with PRN oxycodone Q6 and PRN Tylenol Q4. Pt has paracentesis scheduled 11/22. Call light within reach, no concerns, will continue POC.

## 2024-11-22 NOTE — PLAN OF CARE
Goal Outcome Evaluation:      Plan of Care Reviewed With: patient    Overall Patient Progress: no change    Pt AOx4. Able to make needs known.  No acute issues overnight. Maintained on FR 1,500ml.  Pt expected to have Paracentesis today.   Denies CP and SOB. Call light w/in reach.   Continue POC.       Patient's most recent vital signs are:     Vital signs:  BP: 120/47  Temp: 98.2  HR: 61  RR: 18  SpO2: 97 %     Patient does not have new respiratory symptoms.  Patient does not have new sore throat.  Patient does not have a fever greater than 99.5.

## 2024-11-23 LAB
ALBUMIN SERPL BCG-MCNC: 2.8 G/DL (ref 3.5–5.2)
ALP SERPL-CCNC: 168 U/L (ref 40–150)
ALT SERPL W P-5'-P-CCNC: 24 U/L (ref 0–70)
ANION GAP SERPL CALCULATED.3IONS-SCNC: 11 MMOL/L (ref 7–15)
AST SERPL W P-5'-P-CCNC: 39 U/L (ref 0–45)
BILIRUB SERPL-MCNC: 1.1 MG/DL
BUN SERPL-MCNC: 40.6 MG/DL (ref 8–23)
CALCIUM SERPL-MCNC: 8.2 MG/DL (ref 8.8–10.4)
CHLORIDE SERPL-SCNC: 109 MMOL/L (ref 98–107)
CREAT SERPL-MCNC: 1.38 MG/DL (ref 0.67–1.17)
EGFRCR SERPLBLD CKD-EPI 2021: 54 ML/MIN/1.73M2
ERYTHROCYTE [DISTWIDTH] IN BLOOD BY AUTOMATED COUNT: 19.7 % (ref 10–15)
GLUCOSE BLDC GLUCOMTR-MCNC: 103 MG/DL (ref 70–99)
GLUCOSE BLDC GLUCOMTR-MCNC: 113 MG/DL (ref 70–99)
GLUCOSE BLDC GLUCOMTR-MCNC: 157 MG/DL (ref 70–99)
GLUCOSE BLDC GLUCOMTR-MCNC: 172 MG/DL (ref 70–99)
GLUCOSE SERPL-MCNC: 142 MG/DL (ref 70–99)
HCO3 SERPL-SCNC: 25 MMOL/L (ref 22–29)
HCT VFR BLD AUTO: 29.1 % (ref 40–53)
HGB BLD-MCNC: 9.3 G/DL (ref 13.3–17.7)
MCH RBC QN AUTO: 34.7 PG (ref 26.5–33)
MCHC RBC AUTO-ENTMCNC: 32 G/DL (ref 31.5–36.5)
MCV RBC AUTO: 109 FL (ref 78–100)
PLATELET # BLD AUTO: 64 10E3/UL (ref 150–450)
POTASSIUM SERPL-SCNC: 4.1 MMOL/L (ref 3.4–5.3)
PROT SERPL-MCNC: 4.9 G/DL (ref 6.4–8.3)
RBC # BLD AUTO: 2.68 10E6/UL (ref 4.4–5.9)
SODIUM SERPL-SCNC: 145 MMOL/L (ref 135–145)
WBC # BLD AUTO: 3.3 10E3/UL (ref 4–11)

## 2024-11-23 PROCEDURE — 82040 ASSAY OF SERUM ALBUMIN: CPT | Performed by: INTERNAL MEDICINE

## 2024-11-23 PROCEDURE — 250N000013 HC RX MED GY IP 250 OP 250 PS 637: Performed by: STUDENT IN AN ORGANIZED HEALTH CARE EDUCATION/TRAINING PROGRAM

## 2024-11-23 PROCEDURE — 82947 ASSAY GLUCOSE BLOOD QUANT: CPT | Performed by: INTERNAL MEDICINE

## 2024-11-23 PROCEDURE — 250N000012 HC RX MED GY IP 250 OP 636 PS 637: Performed by: STUDENT IN AN ORGANIZED HEALTH CARE EDUCATION/TRAINING PROGRAM

## 2024-11-23 PROCEDURE — 36415 COLL VENOUS BLD VENIPUNCTURE: CPT | Performed by: INTERNAL MEDICINE

## 2024-11-23 PROCEDURE — 120N000009 HC R&B SNF

## 2024-11-23 PROCEDURE — 85014 HEMATOCRIT: CPT | Performed by: INTERNAL MEDICINE

## 2024-11-23 PROCEDURE — 250N000013 HC RX MED GY IP 250 OP 250 PS 637: Performed by: INTERNAL MEDICINE

## 2024-11-23 RX ADMIN — FUROSEMIDE 40 MG: 40 TABLET ORAL at 08:05

## 2024-11-23 RX ADMIN — ACETAMINOPHEN 650 MG: 325 TABLET, FILM COATED ORAL at 14:10

## 2024-11-23 RX ADMIN — CIPROFLOXACIN HYDROCHLORIDE 500 MG: 500 TABLET, FILM COATED ORAL at 08:04

## 2024-11-23 RX ADMIN — OXYCODONE HYDROCHLORIDE 10 MG: 5 TABLET ORAL at 00:28

## 2024-11-23 RX ADMIN — ASPIRIN 81 MG CHEWABLE TABLET 81 MG: 81 TABLET CHEWABLE at 08:05

## 2024-11-23 RX ADMIN — ACETAMINOPHEN 650 MG: 325 TABLET, FILM COATED ORAL at 20:24

## 2024-11-23 RX ADMIN — PREDNISONE 5 MG: 5 TABLET ORAL at 08:05

## 2024-11-23 RX ADMIN — OXYCODONE HYDROCHLORIDE 10 MG: 5 TABLET ORAL at 20:24

## 2024-11-23 RX ADMIN — METOPROLOL SUCCINATE 200 MG: 200 TABLET, EXTENDED RELEASE ORAL at 08:04

## 2024-11-23 RX ADMIN — Medication 950 MG: at 09:53

## 2024-11-23 RX ADMIN — PANTOPRAZOLE SODIUM 40 MG: 40 TABLET, DELAYED RELEASE ORAL at 08:05

## 2024-11-23 RX ADMIN — OXYCODONE HYDROCHLORIDE 10 MG: 5 TABLET ORAL at 14:10

## 2024-11-23 RX ADMIN — ACETAMINOPHEN 650 MG: 325 TABLET, FILM COATED ORAL at 00:28

## 2024-11-23 NOTE — PLAN OF CARE
"Goal Outcome Evaluation:                    VS: /48 (BP Location: Right arm)   Pulse 61   Temp 98  F (36.7  C) (Oral)   Resp 16   Ht 1.803 m (5' 11\")   Wt 116.2 kg (256 lb 2.8 oz)   SpO2 98%   BMI 35.73 kg/m       O2: 98%   Output: Continent of BB, urinal at bedside   Last BM: 11/22   Activity: Ax1 w/ walker and GB   Skin: Mepilex on sacrum but is healed   Pain: PRN Oxy and Tylenol x1   CMS: Aox4, forgetful   Dressing: Mepilex on sacrum   Diet: Regular, 1500ml fluid restriction   LDA: None   Equipment: Walker, GB, call light   Plan: Continue POC   Additional Info: Pt alert and oriented, able to make needs known and use call light. Dinner , no insulin given. Bedtime , no insulin. Denies SOB, CP, N/V. No acute issues this shift.             "

## 2024-11-23 NOTE — PLAN OF CARE
Goal Outcome Evaluation:    Plan of Care Reviewed With: patient    Overall Patient Progress: no change    Outcome Evaluation: Pt is alert and oriented, jokes a lot. 8/10 L hip pain comfortably managed with Oxycodone 10 mg and Tylenol 650 mg tab x 1 this shift. Had Paracentesis yesterday- RLQ site with dressing CDI. Using urinal in bed, staff empties. Weekly weight starts today from being daily. Pt has no c/o SOB and no s/s of respiratory issue noted at RA. Appear to be sleeping/resting between cares/ meds. Continue with plan of care.    0700: Pt sleeping soundly. Protonix not given  and rescheduled for 0800 for this AM only. Pt was upset the last time he was awaken for 1 pill.     Patient's most recent vital signs are:     Vital signs:  BP: 117/51  Temp: 97.9  HR: 63  RR: 18  SpO2: 99 %     Patient does not have new respiratory symptoms.  Patient does not have new sore throat.  Patient does not have a fever greater than 99.5.

## 2024-11-23 NOTE — PROGRESS NOTES
"    Progress note    No acute events overnight.  Patient had an ultrasound-guided paracentesis yesterday without complications and 2 L of fluid were removed.  Patient also was started on p.o. diuretic regimen yesterday with p.o. Lasix but he states that he gets many side effects from this medication like dizziness and lightheadedness and requested to stop Lasix.  I removed this medication.  Creatinine 1.38 today.  I will continue monitoring his renal function and start p.o. Bumex if his renal function remains to be stable.  Patient continues with significant lower extremity edema.    /48 (BP Location: Right arm)   Pulse 61   Temp 98  F (36.7  C) (Oral)   Resp 16   Ht 1.803 m (5' 11\")   Wt 116.2 kg (256 lb 2.8 oz)   SpO2 98%   BMI 35.73 kg/m      "

## 2024-11-24 ENCOUNTER — APPOINTMENT (OUTPATIENT)
Dept: OCCUPATIONAL THERAPY | Facility: SKILLED NURSING FACILITY | Age: 74
DRG: 314 | End: 2024-11-24
Attending: INTERNAL MEDICINE
Payer: COMMERCIAL

## 2024-11-24 LAB
ANION GAP SERPL CALCULATED.3IONS-SCNC: 8 MMOL/L (ref 7–15)
BUN SERPL-MCNC: 41.9 MG/DL (ref 8–23)
CALCIUM SERPL-MCNC: 7.8 MG/DL (ref 8.8–10.4)
CHLORIDE SERPL-SCNC: 111 MMOL/L (ref 98–107)
CREAT SERPL-MCNC: 1.51 MG/DL (ref 0.67–1.17)
EGFRCR SERPLBLD CKD-EPI 2021: 48 ML/MIN/1.73M2
GLUCOSE SERPL-MCNC: 105 MG/DL (ref 70–99)
HCO3 SERPL-SCNC: 23 MMOL/L (ref 22–29)
POTASSIUM SERPL-SCNC: 3.8 MMOL/L (ref 3.4–5.3)
SODIUM SERPL-SCNC: 142 MMOL/L (ref 135–145)

## 2024-11-24 PROCEDURE — 97535 SELF CARE MNGMENT TRAINING: CPT | Mod: GO

## 2024-11-24 PROCEDURE — 36415 COLL VENOUS BLD VENIPUNCTURE: CPT | Performed by: INTERNAL MEDICINE

## 2024-11-24 PROCEDURE — 82565 ASSAY OF CREATININE: CPT | Performed by: INTERNAL MEDICINE

## 2024-11-24 PROCEDURE — 80048 BASIC METABOLIC PNL TOTAL CA: CPT | Performed by: INTERNAL MEDICINE

## 2024-11-24 PROCEDURE — 250N000013 HC RX MED GY IP 250 OP 250 PS 637: Performed by: STUDENT IN AN ORGANIZED HEALTH CARE EDUCATION/TRAINING PROGRAM

## 2024-11-24 PROCEDURE — 250N000012 HC RX MED GY IP 250 OP 636 PS 637: Performed by: STUDENT IN AN ORGANIZED HEALTH CARE EDUCATION/TRAINING PROGRAM

## 2024-11-24 PROCEDURE — 120N000009 HC R&B SNF

## 2024-11-24 RX ADMIN — PREDNISONE 5 MG: 5 TABLET ORAL at 08:10

## 2024-11-24 RX ADMIN — METOPROLOL SUCCINATE 200 MG: 200 TABLET, EXTENDED RELEASE ORAL at 08:10

## 2024-11-24 RX ADMIN — Medication 950 MG: at 10:51

## 2024-11-24 RX ADMIN — PANTOPRAZOLE SODIUM 40 MG: 40 TABLET, DELAYED RELEASE ORAL at 08:10

## 2024-11-24 RX ADMIN — ASPIRIN 81 MG CHEWABLE TABLET 81 MG: 81 TABLET CHEWABLE at 08:10

## 2024-11-24 RX ADMIN — OXYCODONE HYDROCHLORIDE 10 MG: 5 TABLET ORAL at 20:10

## 2024-11-24 RX ADMIN — ACETAMINOPHEN 650 MG: 325 TABLET, FILM COATED ORAL at 20:10

## 2024-11-24 RX ADMIN — OXYCODONE HYDROCHLORIDE 10 MG: 5 TABLET ORAL at 02:40

## 2024-11-24 RX ADMIN — CIPROFLOXACIN HYDROCHLORIDE 500 MG: 500 TABLET, FILM COATED ORAL at 08:10

## 2024-11-24 RX ADMIN — ACETAMINOPHEN 650 MG: 325 TABLET, FILM COATED ORAL at 02:40

## 2024-11-24 NOTE — PLAN OF CARE
"Goal Outcome Evaluation:  VS: /52 (BP Location: Left arm)   Pulse 64   Temp 98.1  F (36.7  C) (Oral)   Resp 18   Ht 1.803 m (5' 11\")   Wt 116.2 kg (256 lb 2.8 oz)   SpO2 99%   BMI 35.73 kg/m     O2: RA   Output: Urinal w/in reach   Last BM: 11/23   Activity: Shower w/ OT, up in recliner, TV, tablet   Skin: X:  PAC  Coccyx   BLE tubigrips (edema)   Pain: L hip, Aquapad on   CMS:  Neuro: Intact   A&O x3   Dressing: PAC    Diet: Reg  FR 1500 mL  Pt refusing BG cks and insulin   LDA: PAC   Equipment: WW, GB, recliner   Plan: Con't POC.    Additional Info:      Patient's most recent vital signs are:     Vital signs:  BP: 139/52  Temp: 98.1  HR: 64  RR: 18  SpO2: 99 %     Patient does not have new respiratory symptoms.  Patient does not have new sore throat.  Patient does not have a fever greater than 99.5.      Plan of Care Reviewed With: patient  "

## 2024-11-24 NOTE — PLAN OF CARE
"Goal Outcome Evaluation:                    VS: /43 (BP Location: Left arm)   Pulse 70   Temp 98.2  F (36.8  C) (Oral)   Resp 17   Ht 1.803 m (5' 11\")   Wt 116.2 kg (256 lb 2.8 oz)   SpO2 99%   BMI 35.73 kg/m       O2: 99%   Output: Continent of BB, urinal at bedside   Last BM: Today 11/24   Activity: Ax1 w/ walker and GB   Skin: New mepilex on coccyx for protection d/t pt request   Pain: PRN Oxy and Tylenol x1   CMS: Aox4, forgetful   Dressing: New mepilex on sacrum CDI   Diet: Regular, 1500ml fluid restriction   LDA: None   Equipment: Walker, GB, call light   Plan: Continue POC   Additional Info: Pt alert and oriented, able to make needs known and use call light. Requests no further BG checks or insulin and would like to speak to provider tomorrow about resuming his PTA Metformin. BG not checked tonight at dinner or bedtime d/t pt request. Pt became upset later in evening and requested to leave tomorrow stating he \"doesn't feel safe here\". RN was able to resettle pt into bed. Denies SOB, CP, N/V.              "

## 2024-11-24 NOTE — PLAN OF CARE
Goal Outcome Evaluation:    Plan of Care Reviewed With: patient    Overall Patient Progress: no change    Outcome Evaluation: Pt L hip and back pain comfortably managed with Tylenol 650 mg tab and Oxycodone 10 mg tab x 1 this shift. Utilizing Aqua K pad/ T-pump as well for L hip. New pad provided as the current one is not getting warm anymore per pt. Distilled water also ordered. Using urinal in bed. SBA to BR with walker. Had medium soft>formed stool. A of 1 lifting both legs back to bed. BLE 3+ edema noted. Tubigrip OFF for the night and ON during the day per pt. But kep elevated while in bed. RN informed resident about 0600 meds and if it was ok for hm to be woken up for it, pt prefers to take it with 12351 meds. - rescheduled. Pt has no c/o SOB and no s/s of respiratory issue noted at RA. Appear to be sleeping/resting between cares/ meds. Continue with plan of care.    Patient's most recent vital signs are:     Vital signs:  BP: 121/48  Temp: 98  HR: 61  RR: 16        Patient does not have new respiratory symptoms.  Patient does not have new sore throat.  Patient does not have a fever greater than 99.5.

## 2024-11-24 NOTE — PROGRESS NOTES
Status update/goal update note for OT   11/24/24 0919   Appointment Info   Signing Clinician's Name / Credentials (OT) Brandee Cai OTR/L CBIS   OT Goals   Therapy Frequency (OT) 5 times/week   OT Predicted Duration/Target Date for Goal Attainment 11/27/24   OT: Hygiene/Grooming Goal Met   OT: Upper Body Dressing Goal Met   OT: Lower Body Dressing Modified independent   OT: Upper Body Bathing Goal Met   OT: Lower Body Bathing Supervision/stand-by assist;using adaptive equipment   OT: Bed Mobility Goal Met   OT: Transfer Goal Met   OT: Toilet Transfer/Toileting Modified independent   OT: Meal Preparation Modified independent   Self-Care/Home Management   Self-Care/Home Mgmt/ADL, Compensatory, Meal Prep Minutes (76335) 42   Treatment Detail/Skilled Intervention OT:focused on morning adls routine and shower, pt ambulated w/ FWW between room and shower room, demo impaired memory and attn but no unsafe acts. edema in gypsy LE significant and abdomen. L LE weaker than RLE. pt completed shower primarily in sitting on bench w/ hand held shower and use of grabbars but intermittent standing for access to periareas. see gg codes below, updated goals, recommend extending OT goal date to address remaining goals due to pt lives alone.   OT Discharge Planning   OT Plan Precautions: spinal precautions (2023 - pt adheres to them still), bone cancer, chemo, Falls. . Do Not be late for OT session.Pt. does not want to talk when walking.  Rx: 4WW mgmt with object transport (pt request to practice for downsizing belongings at home). Continue lowerbody dressing, toilet transfers and cares. kitchen task. Mod I in room (Pt. does not want to use bands at this time wants to do AROM exercises without resistance and does not want to discuss bands again per therapy session on 11/22. Very particular and can get easily frustrated.)   OT Discharge Recommendation (DC Rec) home with home care occupational therapy   OT Rationale for DC Rec Lives at  condo that is accessible prior to hospitalization d/t ease; however, pt reporting desire to dc to [facility] in [place].   OT Brief overview of current status OT: pt assessed for shower and full body dressing today in anticipation of last day OT, howerver due to edema in gypsy LE especially L, pt continues to need assist w/ LB drg. recommend extending pt to continue to focus on ADLs and functional mob due to pt lives alone.   Total Session Time   Timed Code Treatment Minutes 58   Total Session Time (sum of timed and untimed services) 58   Post Acute Settings Only   What unit is patient on? TCU   OT- Transitional Care Unit Time   Individual Time (minutes) - OT 58   TCU Total Session Time (minutes) - OT 58   TCU Daily Total Session Time   OT TCU Daily Total Session Time 58   Rehab TCU Daily Total Session Time 58   Bed Mobility: Turning side to side/Roll Left and Right   Describe Performance OT: indep   Bed Mobility: Lying to sitting on the side of bed   Describe Performance OT: indep   Transfers: Sit to Stand   Describe Performance OT: sba to modified indep in room w/ FWW or w/ grabbar   Transfers: Chair/Bed transfers   Describe Performance OT: sba to modified indep w/ fww   Picking up Object - Ability to bend/stoop while standing.   Reason Not Done Safety concerns   Upper Body Dressing - Ability to dress/undress above the waist, including fasteners   Describe Performance OT: indep   Lower Body Dressing - Ability to dress/undress below the waist, includes fasteners   Describe Performance oT: variable w/ edema from sba to min A   Lower Body Dressing (Putting On/Taking-Off Footwear)   Describe Performance OT: th applied compression stockings and gripper socks, edema significant in gypsy LEs   Toileting Hygiene - Ability to maintain perineal hygiene and adjust clothes   Describe Performance OT: sba in standing w/ use of grabbar for stability   Personal Hygiene - Ability to maintain personal hygiene (combing hair, shaving,  apply makeup wash/dry face/hands.   Describe Performance OT: indep   Oral Hygiene - Ability to use suitable items to clean teeth.   Describe Performance OT: indep   Shower/bathe self - Ability to bathe self, includes washing and drying self   Describe Performance OT: min A , hand held shower, grabbars, bench   Tub/Shower Transfer - Ability to get in and out of the tub/shower   Describe Performance OT: sba w/ grabbars/bench/FWW

## 2024-11-25 ENCOUNTER — VIRTUAL VISIT (OUTPATIENT)
Dept: GASTROENTEROLOGY | Facility: CLINIC | Age: 74
End: 2024-11-25
Attending: INTERNAL MEDICINE
Payer: COMMERCIAL

## 2024-11-25 ENCOUNTER — TELEPHONE (OUTPATIENT)
Dept: GASTROENTEROLOGY | Facility: CLINIC | Age: 74
End: 2024-11-25
Payer: COMMERCIAL

## 2024-11-25 DIAGNOSIS — R18.8 CIRRHOSIS OF LIVER WITH ASCITES, UNSPECIFIED HEPATIC CIRRHOSIS TYPE (H): Primary | ICD-10-CM

## 2024-11-25 DIAGNOSIS — K74.60 CIRRHOSIS OF LIVER WITH ASCITES, UNSPECIFIED HEPATIC CIRRHOSIS TYPE (H): Primary | ICD-10-CM

## 2024-11-25 LAB
ALBUMIN SERPL BCG-MCNC: 2.9 G/DL (ref 3.5–5.2)
ALBUMIN UR-MCNC: 10 MG/DL
ALP SERPL-CCNC: 178 U/L (ref 40–150)
ALT SERPL W P-5'-P-CCNC: 28 U/L (ref 0–70)
AMMONIA PLAS-SCNC: 59 UMOL/L (ref 16–60)
ANION GAP SERPL CALCULATED.3IONS-SCNC: 8 MMOL/L (ref 7–15)
APPEARANCE UR: CLEAR
AST SERPL W P-5'-P-CCNC: 41 U/L (ref 0–45)
BILIRUB DIRECT SERPL-MCNC: 0.24 MG/DL (ref 0–0.3)
BILIRUB SERPL-MCNC: 1.4 MG/DL
BILIRUB UR QL STRIP: NEGATIVE
BUN SERPL-MCNC: 37 MG/DL (ref 8–23)
CALCIUM SERPL-MCNC: 8.4 MG/DL (ref 8.8–10.4)
CHLORIDE SERPL-SCNC: 110 MMOL/L (ref 98–107)
COLOR UR AUTO: YELLOW
CREAT SERPL-MCNC: 1.42 MG/DL (ref 0.67–1.17)
EGFRCR SERPLBLD CKD-EPI 2021: 52 ML/MIN/1.73M2
ERYTHROCYTE [DISTWIDTH] IN BLOOD BY AUTOMATED COUNT: 19.2 % (ref 10–15)
GLUCOSE SERPL-MCNC: 116 MG/DL (ref 70–99)
GLUCOSE UR STRIP-MCNC: NEGATIVE MG/DL
HCO3 SERPL-SCNC: 25 MMOL/L (ref 22–29)
HCT VFR BLD AUTO: 32.1 % (ref 40–53)
HGB BLD-MCNC: 10 G/DL (ref 13.3–17.7)
HGB UR QL STRIP: ABNORMAL
HYALINE CASTS: 3 /LPF
KETONES UR STRIP-MCNC: NEGATIVE MG/DL
LEUKOCYTE ESTERASE UR QL STRIP: NEGATIVE
MAGNESIUM SERPL-MCNC: 2.4 MG/DL (ref 1.7–2.3)
MCH RBC QN AUTO: 33 PG (ref 26.5–33)
MCHC RBC AUTO-ENTMCNC: 31.2 G/DL (ref 31.5–36.5)
MCV RBC AUTO: 106 FL (ref 78–100)
MUCOUS THREADS #/AREA URNS LPF: PRESENT /LPF
NITRATE UR QL: NEGATIVE
PH UR STRIP: 5.5 [PH] (ref 5–7)
PLATELET # BLD AUTO: 77 10E3/UL (ref 150–450)
POTASSIUM SERPL-SCNC: 4 MMOL/L (ref 3.4–5.3)
PROCALCITONIN SERPL IA-MCNC: 0.06 NG/ML
PROT SERPL-MCNC: 5.1 G/DL (ref 6.4–8.3)
RBC # BLD AUTO: 3.03 10E6/UL (ref 4.4–5.9)
RBC URINE: <1 /HPF
SODIUM SERPL-SCNC: 143 MMOL/L (ref 135–145)
SP GR UR STRIP: 1.02 (ref 1–1.03)
UROBILINOGEN UR STRIP-MCNC: NORMAL MG/DL
WBC # BLD AUTO: 3.9 10E3/UL (ref 4–11)
WBC URINE: <1 /HPF

## 2024-11-25 PROCEDURE — 82140 ASSAY OF AMMONIA: CPT | Performed by: INTERNAL MEDICINE

## 2024-11-25 PROCEDURE — 84145 PROCALCITONIN (PCT): CPT | Performed by: INTERNAL MEDICINE

## 2024-11-25 PROCEDURE — 85041 AUTOMATED RBC COUNT: CPT | Performed by: INTERNAL MEDICINE

## 2024-11-25 PROCEDURE — 81003 URINALYSIS AUTO W/O SCOPE: CPT | Performed by: INTERNAL MEDICINE

## 2024-11-25 PROCEDURE — 36415 COLL VENOUS BLD VENIPUNCTURE: CPT | Performed by: INTERNAL MEDICINE

## 2024-11-25 PROCEDURE — 99215 OFFICE O/P EST HI 40 MIN: CPT | Mod: 95 | Performed by: INTERNAL MEDICINE

## 2024-11-25 PROCEDURE — 99232 SBSQ HOSP IP/OBS MODERATE 35: CPT | Performed by: INTERNAL MEDICINE

## 2024-11-25 PROCEDURE — 84450 TRANSFERASE (AST) (SGOT): CPT | Performed by: INTERNAL MEDICINE

## 2024-11-25 PROCEDURE — 80048 BASIC METABOLIC PNL TOTAL CA: CPT | Performed by: INTERNAL MEDICINE

## 2024-11-25 PROCEDURE — 85014 HEMATOCRIT: CPT | Performed by: INTERNAL MEDICINE

## 2024-11-25 PROCEDURE — 82248 BILIRUBIN DIRECT: CPT | Performed by: INTERNAL MEDICINE

## 2024-11-25 PROCEDURE — 250N000013 HC RX MED GY IP 250 OP 250 PS 637: Performed by: STUDENT IN AN ORGANIZED HEALTH CARE EDUCATION/TRAINING PROGRAM

## 2024-11-25 PROCEDURE — 83735 ASSAY OF MAGNESIUM: CPT | Performed by: INTERNAL MEDICINE

## 2024-11-25 PROCEDURE — 120N000009 HC R&B SNF

## 2024-11-25 PROCEDURE — 84295 ASSAY OF SERUM SODIUM: CPT | Performed by: INTERNAL MEDICINE

## 2024-11-25 PROCEDURE — 250N000013 HC RX MED GY IP 250 OP 250 PS 637: Performed by: INTERNAL MEDICINE

## 2024-11-25 RX ORDER — SPIRONOLACTONE 50 MG/1
50 TABLET, FILM COATED ORAL DAILY
Status: DISCONTINUED | OUTPATIENT
Start: 2024-11-25 | End: 2024-12-06 | Stop reason: HOSPADM

## 2024-11-25 RX ADMIN — OXYCODONE HYDROCHLORIDE 10 MG: 5 TABLET ORAL at 22:40

## 2024-11-25 RX ADMIN — SPIRONOLACTONE 50 MG: 50 TABLET ORAL at 14:42

## 2024-11-25 RX ADMIN — ACETAMINOPHEN 650 MG: 325 TABLET, FILM COATED ORAL at 22:40

## 2024-11-25 RX ADMIN — METFORMIN HYDROCHLORIDE 500 MG: 500 TABLET, FILM COATED ORAL at 17:02

## 2024-11-25 RX ADMIN — METOPROLOL SUCCINATE 200 MG: 200 TABLET, EXTENDED RELEASE ORAL at 09:18

## 2024-11-25 ASSESSMENT — ACTIVITIES OF DAILY LIVING (ADL)
ADLS_ACUITY_SCORE: 0
ADLS_ACUITY_SCORE: 61
ADLS_ACUITY_SCORE: 0
ADLS_ACUITY_SCORE: 0
ADLS_ACUITY_SCORE: 61
ADLS_ACUITY_SCORE: 0
ADLS_ACUITY_SCORE: 61
ADLS_ACUITY_SCORE: 0
ADLS_ACUITY_SCORE: 61
ADLS_ACUITY_SCORE: 61
ADLS_ACUITY_SCORE: 0
ADLS_ACUITY_SCORE: 0
ADLS_ACUITY_SCORE: 61

## 2024-11-25 NOTE — PROGRESS NOTES
Hepatology Follow-Up Visit:     HISTORY OF PRESENT ILLNESS:   I had the pleasure of seeing Gucci Logan for followup in the Liver Clinic at the [facility] on November 25, 2024. 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     The major new event since he was last seen is he had a pretty significant fall and fractured his left femur.  More importantly, imaging suggested the presence of tumors in the bone and ultimately was diagnosed with prostate cancer that is metastatic to bone and lymph nodes.  He is followed by Dr. Plunkett and was started on darolutamide.  By PSA, he has had a very nice response with his PSA falling from 16 down to 2.7.  His last CT scan however suggested that the tumor was progressing.  He currently is in a rehabilitation facility.     He otherwise denies any abdominal pain.  He does complain of some itching, which he attributes to dry skin.  He does have fatigue.  He is requiring large-volume paracenteses and his diuretic dosing is limited by his kidney function.  He also has lower extremity edema.  He has not had any gastrointestinal bleeding or any overt signs of hepatic encephalopathy.     He denies any fevers, chills or cough.  He does note some intermittent shortness of breath.  He does complain of some nausea without vomiting and no diarrhea or constipation.  His appetite has been good.  He does report that his diabetes has been under reasonable control.    Medications:   No current facility-administered medications for this visit.     Current Outpatient Medications   Medication Sig Dispense Refill    [START ON 12/5/2024] darolutamide (NUBEQA) 300 MG tablet Take 2 tablets (600 mg) by mouth 2 times daily for 21 days. . Swallow tablets whole. Take with food. Avoid grapefruit or grapefruit juice. 84 tablet 0     Facility-Administered Medications Ordered in Other Visits   Medication Dose Route Frequency Provider Last Rate Last Admin     - Skin Test Reading -   Does not apply Q21 Days Joshua Mosley MD   Read at 11/19/24 0948    acetaminophen (TYLENOL) Suppository 650 mg  650 mg Rectal Q4H PRN Saumya Torres DO        acetaminophen (TYLENOL) tablet 650 mg  650 mg Oral Q4H PRN Saumya Torres, DO   650 mg at 11/24/24 2010    alteplase (CATHFLO ACTIVASE) injection 1-2 mg  1-2 mg Intravenous Q2H PRN Saumya Torres DO        alum & mag hydroxide-simethicone (MAALOX) suspension 30 mL  30 mL Oral Q4H PRN Saumya Torres DO        aspirin (ASA) chewable tablet 81 mg  81 mg Oral Daily Saumya Torres DO   81 mg at 11/24/24 0810    calcium carbonate (TUMS) chewable tablet 500 mg  500 mg Oral Daily PRN Saumya Torres DO        calcium citrate (CITRACAL) tablet 950 mg  950 mg Oral Daily Saumya Torres DO   950 mg at 11/24/24 1051    ciprofloxacin (CIPRO) tablet 500 mg  500 mg Oral Q24H VINCENT Saumya Torres, DO   500 mg at 11/24/24 0810    darolutamide (NUBEQA) tablet 600 mg  600 mg Oral BID Saumya Torres DO   600 mg at 11/25/24 0924    glucose gel 15-30 g  15-30 g Oral Q15 Min PRN Saumya Torres DO        Or    dextrose 50 % injection 25-50 mL  25-50 mL Intravenous Q15 Min PRN Saumya Torres DO        Or    glucagon injection 1 mg  1 mg Subcutaneous Q15 Min PRN Saumya Torres DO        diclofenac (VOLTAREN) 1 % topical gel 4 g  4 g Topical TID PRN Saumya Torres DO        famotidine (PEPCID) tablet 20 mg  20 mg Oral BID PRN Joshua Mosley MD        heparin lock flush 10 unit/mL injection 5-10 mL  5-10 mL Intracatheter Q1H PRN Saumya Torres DO        [START ON 11/26/2024] heparin lock flush 100 unit/mL injection 5-10 mL  5-10 mL Intracatheter Q28 Days Saumya Torres DO        insulin aspart (NovoLOG) injection (RAPID ACTING)  1-7 Units Subcutaneous TID AC Saumya Torres DO   1 Units at 11/22/24 1703    insulin aspart (NovoLOG) injection (RAPID ACTING)  1-5 Units Subcutaneous At Bedtime Saumya Torres DO        lidocaine (LMX4) cream   Topical  Q1H PRN Saumya Torres DO        lidocaine 1 % 0.1-1 mL  0.1-1 mL Other Q1H PRN Saumya Torres DO        medication instruction   Does not apply Continuous PRN Saumya Torres DO        methocarbamol (ROBAXIN) tablet 500 mg  500 mg Oral Q6H PRN Saumya Torres DO        metoprolol succinate ER (TOPROL XL) 24 hr tablet 200 mg  200 mg Oral Daily Saumya Torres DO   200 mg at 11/25/24 0918    mineral oil-white petrolatum (EUCERIN/MINERIN) cream   Topical Q1H PRN Joshua Mosley MD        naloxone (NARCAN) injection 0.2 mg  0.2 mg Intravenous Q2 Min PRN Saumya Torres DO        Or    naloxone (NARCAN) injection 0.4 mg  0.4 mg Intravenous Q2 Min PRN Saumya Torres DO        Or    naloxone (NARCAN) injection 0.2 mg  0.2 mg Intramuscular Q2 Min PRN Saumya Torres DO        Or    naloxone (NARCAN) injection 0.4 mg  0.4 mg Intramuscular Q2 Min PRN Saumya Torres DO        nitroGLYcerin (NITROSTAT) sublingual tablet 0.4 mg  0.4 mg Sublingual Q5 Min PRN Saumya Torres DO        Nurse may request from Pharmacy a change of form of medication (e.g. Liquid to tablet).   Does not apply Continuous PRN Joshua Mosley MD        oxyCODONE (ROXICODONE) tablet 5-10 mg  5-10 mg Oral Q6H PRN Saumya Torres DO   10 mg at 11/24/24 2010    pantoprazole (PROTONIX) EC tablet 40 mg  40 mg Oral QAM AC Saumya Torres DO   40 mg at 11/24/24 0810    polyethylene glycol (MIRALAX) Packet 17 g  17 g Oral Daily PRN Heber Marquez MD        predniSONE (DELTASONE) tablet 5 mg  5 mg Oral Daily Saumya Torres DO   5 mg at 11/24/24 0810    prochlorperazine (COMPAZINE) tablet 5 mg  5 mg Oral Q6H PRN Joshua Mosley MD        senna-docusate (SENOKOT-S/PERICOLACE) 8.6-50 MG per tablet 1 tablet  1 tablet Oral BID PRN Saumya Torres DO        Or    senna-docusate (SENOKOT-S/PERICOLACE) 8.6-50 MG per tablet 2 tablet  2 tablet Oral BID PRN Saumya Torres DO        [START ON 11/26/2024] sodium chloride (PF) 0.9% PF flush 10-20 mL  10-20 mL  Intracatheter Q28 Days Fluke, Saumya, DO        sodium chloride (PF) 0.9% PF flush 10-20 mL  10-20 mL Intracatheter q1 min prn Fluke, Saumya, DO        sodium chloride (PF) 0.9% PF flush 10-20 mL  10-20 mL Intracatheter Q1H PRN Fluke, Saumya, DO        sodium chloride (PF) 0.9% PF flush 3 mL  3 mL Intracatheter q1 min prn Fluke, Saumya, DO        tuberculin injection 5 Units  5 Units Intradermal Q21 Days Joshua Mosley MD          Vitals:   There were no vitals taken for this visit.    Physical Exam:   In general he looks chronically ill. HEENT exam shows no scleral icterus or temporal muscle wasting. Skin exam shows no stigmata of chronic liver disease. Neurologic exam shows no asterixis.     Labs:   Lab Results   Component Value Date     11/25/2024    POTASSIUM 4.0 11/25/2024    CHLORIDE 110 (H) 11/25/2024    ANIONGAP 8 11/25/2024    CO2 25 11/25/2024    BUN 37.0 (H) 11/25/2024    CR 1.42 (H) 11/25/2024    GFRESTIMATED 52 (L) 11/25/2024    SOLEDAD 8.4 (L) 11/25/2024      Lab Results   Component Value Date    WBC 3.9 (L) 11/25/2024    HGB 10.0 (L) 11/25/2024    HCT 32.1 (L) 11/25/2024     (H) 11/25/2024    MCH 33.0 11/25/2024    MCHC 31.2 (L) 11/25/2024    RDW 19.2 (H) 11/25/2024    PLT 77 (L) 11/25/2024     Lab Results   Component Value Date    ALBUMIN 2.9 (L) 11/25/2024    ALKPHOS 178 (H) 11/25/2024    AST 41 11/25/2024    BILICONJ 0.0 07/11/2008     Lab Results   Component Value Date    INR 1.32 (H) 08/19/2024     MELD 3.0: 11 at 8/21/2024  6:34 AM  MELD-Na: 10 at 8/21/2024  6:34 AM  Calculated from:  Serum Creatinine: 1.04 mg/dL at 8/21/2024  6:34 AM  Serum Sodium: 140 mmol/L (Using max of 137 mmol/L) at 8/21/2024  6:34 AM  Total Bilirubin: 1.1 mg/dL at 8/20/2024  6:06 AM  Serum Albumin: 2.7 g/dL at 8/20/2024  6:06 AM  INR(ratio): 1.32 at 8/19/2024  5:14 PM  Age at listing (hypothetical): 74 years  Sex: Male at 8/21/2024  6:34 AM    Imaging:   No images are attached to the encounter.      Assessment/Plan:   IMPRESSION:   Mr. 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  His disease is now decompensated based on refractory ascites.  He is currently off diuretics but I will try spironolactone 50 mg/day.  He also is having losartan held which I agree with.  I did suggest to hold metoprolol but he is stated he gets quite short of breath when that has been done in the past.  I do think it is okay for him to receive metformin as this is well-tolerated in patients even with advanced liver disease.  I have recommended that he have paracenteses only twice a week and generally up to 5 L at any 1 time.  Otherwise, I will see him back in the clinic in 3 months for repeat imaging and blood work.     I did spend a total of 40 minutes (on the date of the encounter), including 30 minutes of face-to-face clinic time including counseling. The rest of the time was spent in documentation and review of records.    Thank you very much for allowing me to participate in the care of this patient.  If you have any questions regarding my recommendations, please do not hesitate to contact me.      Sincerely,   Mata Renteria MD      Professor of Medicine  [employer]      Executive Medical Director, Solid Organ Transplant Program  [facility]    Virtual Visit Details    Type of service:  Video Visit   Video Start Time:  1:30 PM  Video End Time: 1:55 PM    Originating Location (pt. Location): TCU    Distant Location (provider location):  On-site  Platform used for Video Visit: Brent

## 2024-11-25 NOTE — LETTER
11/25/2024      Gucci Logan  [id number] [facility] [id number]      Dear Colleague,    Thank you for referring your patient, Gucci Logan, to the [facility] [place]. Please see a copy of my visit note below.    Hepatology Follow-Up Visit:     HISTORY OF PRESENT ILLNESS:   I had the pleasure of seeing Gucci Logan for followup in the Liver Clinic at the [facility] on November 25, 2024. 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     The major new event since he was last seen is he had a pretty significant fall and fractured his left femur.  More importantly, imaging suggested the presence of tumors in the bone and ultimately was diagnosed with prostate cancer that is metastatic to bone and lymph nodes.  He is followed by Dr. Rebekah Malhotra and was started on darolutamide.  By PSA, he has had a very nice response with his PSA falling from 16 down to 2.7.  His last CT scan however suggested that the tumor was progressing.  He currently is in a rehabilitation facility.     He otherwise denies any abdominal pain.  He does complain of some itching, which he attributes to dry skin.  He does have fatigue.  He is requiring large-volume paracenteses and his diuretic dosing is limited by his kidney function.  He also has lower extremity edema.  He has not had any gastrointestinal bleeding or any overt signs of hepatic encephalopathy.     He denies any fevers, chills or cough.  He does note some intermittent shortness of breath.  He does complain of some nausea without vomiting and no diarrhea or constipation.  His appetite has been good.  He does report that his diabetes has been under reasonable control.    Medications:   No current facility-administered medications for this visit.     Current Outpatient Medications   Medication Sig Dispense Refill     [START ON 12/5/2024] darolutamide (NUBEQA) 300 MG tablet Take 2 tablets (600  mg) by mouth 2 times daily for 21 days. . Swallow tablets whole. Take with food. Avoid grapefruit or grapefruit juice. 84 tablet 0     Facility-Administered Medications Ordered in Other Visits   Medication Dose Route Frequency Provider Last Rate Last Admin     - Skin Test Reading -   Does not apply Q21 Days Joshua Mosley MD   Read at 11/19/24 0948     acetaminophen (TYLENOL) Suppository 650 mg  650 mg Rectal Q4H PRN Saumya Torres DO         acetaminophen (TYLENOL) tablet 650 mg  650 mg Oral Q4H PRN Saumya Torres DO   650 mg at 11/24/24 2010     alteplase (CATHFLO ACTIVASE) injection 1-2 mg  1-2 mg Intravenous Q2H PRN Saumya Torres DO         alum & mag hydroxide-simethicone (MAALOX) suspension 30 mL  30 mL Oral Q4H PRN Saumya Torres DO         aspirin (ASA) chewable tablet 81 mg  81 mg Oral Daily Saumya Torres DO   81 mg at 11/24/24 0810     calcium carbonate (TUMS) chewable tablet 500 mg  500 mg Oral Daily PRN Saumya Torres DO         calcium citrate (CITRACAL) tablet 950 mg  950 mg Oral Daily Saumya Torres, DO   950 mg at 11/24/24 1051     ciprofloxacin (CIPRO) tablet 500 mg  500 mg Oral Q24H VINCENT Saumya Torres, DO   500 mg at 11/24/24 0810     darolutamide (NUBEQA) tablet 600 mg  600 mg Oral BID Saumya Torres, DO   600 mg at 11/25/24 0924     glucose gel 15-30 g  15-30 g Oral Q15 Min PRN Saumya Torres DO        Or     dextrose 50 % injection 25-50 mL  25-50 mL Intravenous Q15 Min PRN Saumya Torres DO        Or     glucagon injection 1 mg  1 mg Subcutaneous Q15 Min PRN Saumya Torres DO         diclofenac (VOLTAREN) 1 % topical gel 4 g  4 g Topical TID PRN Saumya Torres DO         famotidine (PEPCID) tablet 20 mg  20 mg Oral BID PRN Joshua Mosley MD         heparin lock flush 10 unit/mL injection 5-10 mL  5-10 mL Intracatheter Q1H PRN Saumya Torres DO         [START ON 11/26/2024] heparin lock flush 100 unit/mL injection 5-10 mL  5-10 mL Intracatheter Q28 Days Saumya Torres, DO          insulin aspart (NovoLOG) injection (RAPID ACTING)  1-7 Units Subcutaneous TID AC Saumya Torres DO   1 Units at 11/22/24 1703     insulin aspart (NovoLOG) injection (RAPID ACTING)  1-5 Units Subcutaneous At Bedtime Saumya Torres DO         lidocaine (LMX4) cream   Topical Q1H PRN Saumya Torres DO         lidocaine 1 % 0.1-1 mL  0.1-1 mL Other Q1H PRN Saumya Torres DO         medication instruction   Does not apply Continuous PRN Saumya Torres DO         methocarbamol (ROBAXIN) tablet 500 mg  500 mg Oral Q6H PRN Saumya Torres DO         metoprolol succinate ER (TOPROL XL) 24 hr tablet 200 mg  200 mg Oral Daily Saumya Torres DO   200 mg at 11/25/24 0918     mineral oil-white petrolatum (EUCERIN/MINERIN) cream   Topical Q1H PRN Joshua Mosley MD         naloxone (NARCAN) injection 0.2 mg  0.2 mg Intravenous Q2 Min PRN Saumya Torres DO        Or     naloxone (NARCAN) injection 0.4 mg  0.4 mg Intravenous Q2 Min PRN Saumya Torres DO        Or     naloxone (NARCAN) injection 0.2 mg  0.2 mg Intramuscular Q2 Min PRN Saumya Torres DO        Or     naloxone (NARCAN) injection 0.4 mg  0.4 mg Intramuscular Q2 Min PRN Saumya Torres DO         nitroGLYcerin (NITROSTAT) sublingual tablet 0.4 mg  0.4 mg Sublingual Q5 Min PRN Saumya Torres DO         Nurse may request from Pharmacy a change of form of medication (e.g. Liquid to tablet).   Does not apply Continuous PRN Joshua Mosley MD         oxyCODONE (ROXICODONE) tablet 5-10 mg  5-10 mg Oral Q6H PRN Saumya Torres DO   10 mg at 11/24/24 2010     pantoprazole (PROTONIX) EC tablet 40 mg  40 mg Oral QAM AC Saumya Torres DO   40 mg at 11/24/24 0810     polyethylene glycol (MIRALAX) Packet 17 g  17 g Oral Daily PRN Heber Marquez MD         predniSONE (DELTASONE) tablet 5 mg  5 mg Oral Daily Saumya Torres DO   5 mg at 11/24/24 0810     prochlorperazine (COMPAZINE) tablet 5 mg  5 mg Oral Q6H PRN Joshua Mosley MD         senna-docusate  (SENOKOT-S/PERICOLACE) 8.6-50 MG per tablet 1 tablet  1 tablet Oral BID PRN Fluke, Saumya, DO        Or     senna-docusate (SENOKOT-S/PERICOLACE) 8.6-50 MG per tablet 2 tablet  2 tablet Oral BID PRN Fluke, Saumya, DO         [START ON 11/26/2024] sodium chloride (PF) 0.9% PF flush 10-20 mL  10-20 mL Intracatheter Q28 Days Fluke, Saumya, DO         sodium chloride (PF) 0.9% PF flush 10-20 mL  10-20 mL Intracatheter q1 min prn Fluke, Saumya, DO         sodium chloride (PF) 0.9% PF flush 10-20 mL  10-20 mL Intracatheter Q1H PRN Fluke, Saumya, DO         sodium chloride (PF) 0.9% PF flush 3 mL  3 mL Intracatheter q1 min prn Fluke, Saumya, DO         tuberculin injection 5 Units  5 Units Intradermal Q21 Days Joshua Mosley MD          Vitals:   There were no vitals taken for this visit.    Physical Exam:   In general he looks chronically ill. HEENT exam shows no scleral icterus or temporal muscle wasting. Skin exam shows no stigmata of chronic liver disease. Neurologic exam shows no asterixis.     Labs:   Lab Results   Component Value Date     11/25/2024    POTASSIUM 4.0 11/25/2024    CHLORIDE 110 (H) 11/25/2024    ANIONGAP 8 11/25/2024    CO2 25 11/25/2024    BUN 37.0 (H) 11/25/2024    CR 1.42 (H) 11/25/2024    GFRESTIMATED 52 (L) 11/25/2024    SOLEDAD 8.4 (L) 11/25/2024      Lab Results   Component Value Date    WBC 3.9 (L) 11/25/2024    HGB 10.0 (L) 11/25/2024    HCT 32.1 (L) 11/25/2024     (H) 11/25/2024    MCH 33.0 11/25/2024    MCHC 31.2 (L) 11/25/2024    RDW 19.2 (H) 11/25/2024    PLT 77 (L) 11/25/2024     Lab Results   Component Value Date    ALBUMIN 2.9 (L) 11/25/2024    ALKPHOS 178 (H) 11/25/2024    AST 41 11/25/2024    BILICONJ 0.0 07/11/2008     Lab Results   Component Value Date    INR 1.32 (H) 08/19/2024     MELD 3.0: 11 at 8/21/2024  6:34 AM  MELD-Na: 10 at 8/21/2024  6:34 AM  Calculated from:  Serum Creatinine: 1.04 mg/dL at 8/21/2024  6:34 AM  Serum Sodium: 140 mmol/L (Using max of 137  mmol/L) at 8/21/2024  6:34 AM  Total Bilirubin: 1.1 mg/dL at 8/20/2024  6:06 AM  Serum Albumin: 2.7 g/dL at 8/20/2024  6:06 AM  INR(ratio): 1.32 at 8/19/2024  5:14 PM  Age at listing (hypothetical): 74 years  Sex: Male at 8/21/2024  6:34 AM    Imaging:   No images are attached to the encounter.     Assessment/Plan:   IMPRESSION:   Mr. 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  His disease is now decompensated based on refractory ascites.  He is currently off diuretics but I will try spironolactone 50 mg/day.  He also is having losartan held which I agree with.  I did suggest to hold metoprolol but he is stated he gets quite short of breath when that has been done in the past.  I do think it is okay for him to receive metformin as this is well-tolerated in patients even with advanced liver disease.  I have recommended that he have paracenteses only twice a week and generally up to 5 L at any 1 time.  Otherwise, I will see him back in the clinic in 3 months for repeat imaging and blood work.     I did spend a total of 40 minutes (on the date of the encounter), including 30 minutes of face-to-face clinic time including counseling. The rest of the time was spent in documentation and review of records.    Thank you very much for allowing me to participate in the care of this patient.  If you have any questions regarding my recommendations, please do not hesitate to contact me.      Sincerely,   Mata Renteria MD      Professor of Medicine  [employer]      Executive Medical Director, Solid Organ Transplant Program  [facility]    Virtual Visit Details    Type of service:  Video Visit   Video Start Time:  1:30 PM  Video End Time: 1:55 PM    Originating Location (pt. Location): TCU    Distant Location (provider location):  On-site  Platform used for Video Visit: Brent      Again, thank you for allowing me to participate in the care of your patient.         Sincerely,        Mata Renteria MD

## 2024-11-25 NOTE — PLAN OF CARE
"Goal Outcome Evaluation:    Per report, pt has been very upset and angry to some staff ( 3 staff- 2 on days and 1 on evening).  NOC JUAN came up yp this RN reporting that pt has been refusing any help from him and was immediately mad at him on their very first encounter and asked him to leave his room. This RN went to pt room. Pt's face was so mad and his tone of voice is defensive as well. He remembered this RN from previous night. RN asked pt what was going on and he went on telling this RN all his complains that started in the morning and then happened again in the evening. RN listened and acknowledged pt's feelings. RN apologized for his bad day but pt responded \" Your sorry is not going to changed a thing and you are not part of it\". Attempts to de-escalate pt were unsuccessful and although he can carry a calm conversation with this RN, his face remained very agitated and verbalized not wanting to receive any more help or care from staff here. When RN about to empty his urinal pt initially refused as he doesn not want to be charge for a fee. Reassurance provided.  At 0200: RN checked on pt and assessed his pain. Reported pain on L hip and when RN offered pain med, continues to refused and claimed that he will ask his friend to bring his own supplies. Requesting to talk to supervisor, hospitalist and cardiologist this am. Will communicate this request. He asked for pen and paper to take down all his complains- provided. Reminded and reassured pt that if ever he change his mind, his pain pill is available and this RN will bring it to him. Pt said he will try to sleep but doubts if he can. Will continue to provide care and assistance as much as pt would allow. Will update PCS and provider this am.  "

## 2024-11-25 NOTE — TELEPHONE ENCOUNTER
Call back to the patient. Patient is currently in [facility]. Patient is frustrated with the care he is getting at the TCU. Patient doesn't feel like he is getting any care that is helping him and would rather be at home.  Patient is scheduled for a video visit with Dr. Renteria this afternoon and was wondering if Dr. Renteria would be able to help get him discharged sooner. Patient is aware that Dr. Renteria is not going to be able to get him discharged any sooner, that will be up to the in house team that is following him at the TCU. Recommended that the patient keep following up with his TCU care team to find out next steps. Patient verbalized understanding.    Ava SNELL LPN  [facility]     --------------   Health Call Center    Phone Message    May a detailed message be left on voicemail: yes     Reason for Call: Other: Patient is requesting Ava to call him back regarding his TCU Treatment troubles. He is wanting to be removed from TCU and is wanting to know next steps because he is stating that they are switching his drugs, paracentesis, and not doing anything good. Please call the patient back.     Action Taken: Message routed to:  [facility] Hep. Liver    Travel Screening: Not Applicable     Date of Service:

## 2024-11-25 NOTE — CONSULTS
"    Interventional Radiology  [facility] [place] Consult Note  11/25/24   3:10 PM    Consult Requested: \"therapeutic US guided paracentesis\"    Recommendations/Plan:  Patient is on IR schedule tomorrow for a therapeutic paracentesis.   Labs WNL for procedure.  Orders entered for procedure No NPO required.  Medications to be held include: none.      Mr. 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  His disease is now decompensated based on refractory ascites.  He is currently off diuretics but I will try spironolactone 50 mg/day. GI has recommended regualr (biweekly) paracentesis.    Vitals:   /50 (BP Location: Left arm)   Pulse 64   Temp 98  F (36.7  C) (Oral)   Resp 16   Ht 1.803 m (5' 11\")   Wt 116.2 kg (256 lb 2.8 oz)   SpO2 98%   BMI 35.73 kg/m      Pertinent Labs:   Lab Results   Component Value Date    WBC 3.9 (L) 11/25/2024    WBC 3.3 (L) 11/23/2024    WBC 2.9 (L) 11/21/2024    WBC 4.5 04/05/2021    WBC 4.6 01/12/2021    WBC 5.0 09/11/2020     Lab Results   Component Value Date    HGB 10.0 11/25/2024    HGB 9.3 11/23/2024    HGB 8.3 11/21/2024    HGB 14.1 04/05/2021    HGB 14.0 01/12/2021    HGB 14.0 09/11/2020     Lab Results   Component Value Date    PLT 77 11/25/2024    PLT 64 11/23/2024    PLT 55 11/21/2024    PLT 63 04/05/2021    PLT 59 01/12/2021    PLT 84 09/11/2020     Lab Results   Component Value Date    INR 1.32 (H) 08/19/2024    INR 1.14 04/05/2021    PTT 27 02/20/2023    PTT 30 07/08/2020     Lab Results   Component Value Date    POTASSIUM 4.0 11/25/2024    POTASSIUM 4.1 01/18/2023    POTASSIUM 4.3 04/05/2021        COVID-19 Antibody Results, Testing for Immunity           No data to display              COVID-19 PCR Results           No data to display                Odilia Weiner PA-C  Interventional Radiology    "

## 2024-11-25 NOTE — PROGRESS NOTES
"   11/25/24 0900   Appointment Info   Signing Clinician's Name / Credentials (PT) Luzma Greene DPT   Appointment Canceled Reason (PT) Patient declined   Appointment Cancel Comments (PT) PT: Appraoched pt at scheduled time of 0845. Pt up in recliner, declined all activity, citing many grievences with nursing staff. Stating he is waiting to talk to the MD to \"see if he can leave.\" Offerred to assist pt in any way this morning, including getting water, etc. declines all stating \"I try not to eat or drink anything here.\" Spoke to Luzma RN - who is aware of pt's concerns. If pt does not leave today, may reapproach in afternoon if schedule allows.   Rehab Comments (PT) PT: -45 mintues.       "

## 2024-11-25 NOTE — NURSING NOTE
Current patient location: Patient declined to provide     Is the patient currently in the state of [place]? YES    Visit mode:VIDEO    If the visit is dropped, the patient can be reconnected by:VIDEO VISIT: Text to cell phone:   Telephone Information:   Mobile [phone]       Will anyone else be joining the visit? NO  (If patient encounters technical issues they should call [phone]    Are changes needed to the allergy or medication list? No    Are refills needed on medications prescribed by this physician? NO    Rooming Documentation:  Not applicable    Reason for visit: RECHECK    Zahida OVIEDOF

## 2024-11-25 NOTE — PLAN OF CARE
"VS: /50 (BP Location: Left arm)   Pulse 64   Temp 98  F (36.7  C) (Oral)   Resp 16   Ht 1.803 m (5' 11\")   Wt 116.2 kg (256 lb 2.8 oz)   SpO2 98%   BMI 35.73 kg/m      O2: 98% on RA   Output: Continent bowel and bladder   Last BM: 11/24/24   Activity: Mod I in room w/walker   Skin: Mepilex on coccyx for protection, de accessed port a cath, BLE tubigrips for edema   Pain: Denies   CMS:  Neuro: Alert and oriented x4, able to make needs known.    Dressing: Mepilex on coccyx for protection   Diet: Regular diet, thin liquids, on 1500mL FR, patient refuses BG checks for insulin   LDA: De accessed Port a Cath   Equipment: WW, GB, call light   Plan: Con't POC.    Additional Info: Patient was calm and cooperative with most cares this shift, refused most of morning meds, stated that metoprolol and chemo med were the \"only ones I feel safe taking\". Patient appears a lot more calm this shift and talking in a calmer manner. Orders from primary care provider given to writer and relayed to provider on unit. Orders for spironolactone, metformin, and paracentesis' twice a week given. No acute issues this shift, continue POC.    Goal Outcome Evaluation:      Plan of Care Reviewed With: patient    Overall Patient Progress: no changeOverall Patient Progress: no change       Patient's most recent vital signs are:     Vital signs:  BP: 121/50  Temp: 98  HR: 64  RR: 16  SpO2: 98 %     Patient does not have new respiratory symptoms.  Patient does not have new sore throat.  Patient does not have a fever greater than 99.5.    "

## 2024-11-25 NOTE — PROGRESS NOTES
[facility] Transitional Care    Medicine Progress Note - Hospitalist Service    Date of Admission:  11/16/2024    Assessment & Plan   74-year-old male with history of HTN, metastatic prostate cancer, cirrhosis secondary to chronic HCV infection who presented to hospital (10/29 - 11/16) for a heart failure exacerbation 2/2 hypervolemia. He was started on diuretics.   Patient was also found to have SBP and received antibiotics. He also was found with RENNY and diuretic was held.   Transferred to TCU for ongoing rehab needs.      Today's updates 11/25/24  - No acute events overnight.  Patient was very upset this morning, he complained about everything food, Internet access, staff, medications, etc. multiple TCU staff members including myself spent a significant amount of time discussing all these issues with the patient this morning. Patient looks different from other days, more agitated. We have some concern for possible infectious process. I ordered Ammonia, CRP, Uax with urine culture and CMP.   -- Patient had a video appt with GI and recommended: Metformin 500 mg BID, Aldactone 50 daily and bi-weekly therapeutic US guided paracentesis.   -- Cr 1.42      Physical deconditioning:   -Continue working with physical therapy and Occupational Therapy.     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   Ascites.    Longstanding hx of HCM, recently established care w/ [facility] for HF. Has been struggling w/ worsening BLE edema, increased abdominal distension (weeping ascites), from poorly controlled hypervolemia. PTA diuretic regimen w/ Spironolactone was deemed inadequate, admitted for more aggressive titration of diuretic regimen w/ close cardiac monitoring. Started on Bumex drip here by Cards. Repeat ECHO 10/30/24 showing hyperkinetic w/ EF 65-70%, dynamic outflow tract obstruction, peak gradient 119mmHg at rest, grade II moderate diastolic dysfunction - on most recent  echo 11/8 this gradient was lower (80) indicative of probable over-estimation on prior.  Transferred to Medicine service 10/31 given a diagnosis of SBP from ascites.  He was aggressively diuresed at the beginning of his hospitalization but subsequent developed acute kidney injury and borderline BP. His metoprolol was reduced early in his admission and resumed at home dose on 11/9.  His shortness of breath improved after restarting his metoprolol.  -His prior to admission valsartan was held during admission due to low blood pressures and dizziness.    VQ scan from 11/11/24 negative      - Continue metoprolol succinate 200 mg daily  - Continue aspirin 81 mg daily.   - Currently the patient is off diuretics due to RENNY. He had a Cr bump during the weekend, continue monitoring.   - Start Aldactone 50 mg daily.   - Continue holding Valsartan   - Avoid vasodilators  - Ranolazine was stopped d/t no improvement of his symptoms  - Follow-up daily wt. I/o. Frequent bmp. Monitor vitals.   - Monitor lytes: keep K >4, Mg >2. RN protocol prn  - Lymphedema wraps  - Elevate legs.   - He is already being coordinated for myosin inhibitors as an outpatient and he will follow up with Dr. Gonsales on an outpatient basis.      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    Primary Hepatologist: Dr. Renteria   - Continue cipro for sbp ppx  - IR consult for new therapeutic paracentesis.   - Outpatient liver team  - Monitor LFT     RENNY, suspect prerenal  CKD Stage III  Each time diuretic has been ordered he has had a significant increase in his creatinine.   - Holding loop diuretic for now. Continue monitoring.   - follow-up bmp, I.o.   - avoid nephrotoxics.      Anemia of chronic disease, + possible small volume intraperitoneal bleed following paracentesis  BRBPR 11/7  Hx  "of Melena.   HGB stable.      Metastatic Prostate Cancer  Chronic Pancytopenia   Initially diagnosed earlier this year incidentally following a fall at home, fracturing his L femur. ORIF 05/24/24 and curretage samples c/w adenocarcinoma of prostate origin. Has since followed w/ Oncology as an OP, last saw 10/24/24. PTA on Daralutamide BID, Docetaxel (C4D1 was on 10/24/24) and Leuprolide K7zxofdo (last 9/5/24). Also gets Neulasta, last 10/25. Has chronic pancytopenia, likely d/t multiple comorbidities (cirrhosis, CKD) and iatrogenic (chemotherapy). Baseline hgb ~8-9 recently.   - Oncology consulted here 10/31 and recommended to continue Daralutamide.   - continue pta prednisone. 5 mg daily.   - Oncology outpt follow-up.      Type 2 Diabetes Mellitus, non-insulin-dependent, well-controlled  Last A1c 5.8% on 10/29/24.   He is refusing ssi. GI okay with metformin.     Recent Labs   Lab 11/25/24  1058 11/24/24  0537 11/23/24  2204 11/23/24  1743 11/23/24  1359 11/23/24  1024   * 105* 157* 113* 172* 142*           Diet: Regular Diet Adult  Fluid restriction 1500 ML FLUID  Snacks/Supplements Adult: Special K Bar; Between Meals  Snacks/Supplements Adult: Other; If pt would like a snack or suppelment, please send; With Meals    DVT Prophylaxis: Pneumatic Compression Devices and Anti-embolisim stockings (TEDs)  Rankin Catheter: Not present  Lines: None     Cardiac Monitoring: None  Code Status: Full Code      Clinically Significant Risk Factors          # Hyperchloremia: Highest Cl = 111 mmol/L in last 2 days, will monitor as appropriate          # Hypoalbuminemia: Lowest albumin = 2.6 g/dL at 11/18/2024  6:41 AM, will monitor as appropriate   # Thrombocytopenia: Lowest platelets = 77 in last 2 days, will monitor for bleeding   # Hypertension: Noted on problem list            # Obesity: Estimated body mass index is 35.73 kg/m  as calculated from the following:    Height as of this encounter: 1.803 m (5' 11\").    Weight " as of this encounter: 116.2 kg (256 lb 2.8 oz).   # Moderate Malnutrition: based on nutrition assessment      # Financial/Environmental Concerns:           Social Drivers of Health    Tobacco Use: Medium Risk (11/25/2024)    Patient History     Smoking Tobacco Use: Former     Smokeless Tobacco Use: Never     Passive Exposure: Never          Disposition Plan     Medically Ready for Discharge: Anticipated in 5+ Days             Heber Marquez MD  Hospitalist Service  [facility]  Securely message with [employer] (more info)  Text page via [employer] Paging/Directory   ______________________________________________________________________    Interval History     Acute events as above.     Physical Exam   Vital Signs: Temp: 98  F (36.7  C) Temp src: Oral BP: 121/50 Pulse: 64   Resp: 16 SpO2: 98 % O2 Device: None (Room air)    Weight: 256 lbs 2.79 oz    He refused physical examination today    Medical Decision Making       45 MINUTES SPENT BY ME on the date of service doing chart review, history, exam, documentation & further activities per the note.      Data     I have personally reviewed the following data over the past 24 hrs:    3.9 (L)  \   10.0 (L)   / 77 (L)     143 110 (H) 37.0 (H) /  116 (H)   4.0 25 1.42 (H) \     ALT: 28 AST: 41 AP: 178 (H) TBILI: 1.4 (H)   ALB: 2.9 (L) TOT PROTEIN: 5.1 (L) LIPASE: N/A     Procal: 0.06 CRP: N/A Lactic Acid: N/A         Imaging results reviewed over the past 24 hrs:   No results found for this or any previous visit (from the past 24 hours).

## 2024-11-25 NOTE — PLAN OF CARE
Goal Outcome Evaluation:    Plan of Care Reviewed With: patient    Overall Patient Progress: no change    Outcome Evaluation: Pt continue to be upset but calmer during encounters after 2nd half of the shift with this RN. He reported twisting his hip for reaching on something but continue to refused PRN pain med. PCS updated and went to talk to pt this am. Continent of medium soft BM x 1. SBA to recliner. Pt reported feeling nauseous and wondering why? Reminded pt that he was awake the whole night and lack of sleep can make you feel nauseous, pt agrees.BLE with +3 edema and mild petechial rash- not itching per pt. C/o Tubigrip's have not christopher changed  or washed for 1 week. New BLE TubiGrip provided and applied while pt seated on his recliner. Pt calmer and more pleasant at the end of shift, appreciative of RNs assistance to him. Will continue to ff-up on pt's concern.     Patient's most recent vital signs are:     Vital signs:  BP: 114/43  Temp: 98.2  HR: 70  RR: 17  SpO2: 99 %     Patient does not have new respiratory symptoms.  Patient does not have new sore throat.  Patient does not have a fever greater than 99.5.

## 2024-11-26 ENCOUNTER — APPOINTMENT (OUTPATIENT)
Dept: INTERVENTIONAL RADIOLOGY/VASCULAR | Facility: CLINIC | Age: 74
End: 2024-11-26
Payer: COMMERCIAL

## 2024-11-26 ENCOUNTER — APPOINTMENT (OUTPATIENT)
Dept: PHYSICAL THERAPY | Facility: SKILLED NURSING FACILITY | Age: 74
DRG: 314 | End: 2024-11-26
Attending: INTERNAL MEDICINE
Payer: COMMERCIAL

## 2024-11-26 PROCEDURE — 250N000013 HC RX MED GY IP 250 OP 250 PS 637: Performed by: STUDENT IN AN ORGANIZED HEALTH CARE EDUCATION/TRAINING PROGRAM

## 2024-11-26 PROCEDURE — 120N000009 HC R&B SNF

## 2024-11-26 PROCEDURE — 272N000502 HC NEEDLE CR3

## 2024-11-26 PROCEDURE — 0W9G3ZZ DRAINAGE OF PERITONEAL CAVITY, PERCUTANEOUS APPROACH: ICD-10-PCS | Performed by: PHYSICIAN ASSISTANT

## 2024-11-26 PROCEDURE — 49083 ABD PARACENTESIS W/IMAGING: CPT | Performed by: PHYSICIAN ASSISTANT

## 2024-11-26 PROCEDURE — 49083 ABD PARACENTESIS W/IMAGING: CPT

## 2024-11-26 PROCEDURE — 97110 THERAPEUTIC EXERCISES: CPT | Mod: GP

## 2024-11-26 PROCEDURE — 250N000009 HC RX 250

## 2024-11-26 PROCEDURE — 250N000013 HC RX MED GY IP 250 OP 250 PS 637: Performed by: INTERNAL MEDICINE

## 2024-11-26 RX ADMIN — METOPROLOL SUCCINATE 200 MG: 200 TABLET, EXTENDED RELEASE ORAL at 08:09

## 2024-11-26 RX ADMIN — ACETAMINOPHEN 650 MG: 325 TABLET, FILM COATED ORAL at 12:05

## 2024-11-26 RX ADMIN — OXYCODONE HYDROCHLORIDE 10 MG: 5 TABLET ORAL at 20:59

## 2024-11-26 RX ADMIN — METFORMIN HYDROCHLORIDE 500 MG: 500 TABLET, FILM COATED ORAL at 08:09

## 2024-11-26 RX ADMIN — OXYCODONE HYDROCHLORIDE 10 MG: 5 TABLET ORAL at 12:05

## 2024-11-26 RX ADMIN — SPIRONOLACTONE 50 MG: 50 TABLET ORAL at 08:09

## 2024-11-26 RX ADMIN — ACETAMINOPHEN 650 MG: 325 TABLET, FILM COATED ORAL at 20:59

## 2024-11-26 RX ADMIN — LIDOCAINE HYDROCHLORIDE 5 ML: 10 INJECTION, SOLUTION EPIDURAL; INFILTRATION; INTRACAUDAL; PERINEURAL at 13:32

## 2024-11-26 RX ADMIN — METFORMIN HYDROCHLORIDE 500 MG: 500 TABLET, FILM COATED ORAL at 18:01

## 2024-11-26 ASSESSMENT — ACTIVITIES OF DAILY LIVING (ADL)
ADLS_ACUITY_SCORE: 58
DEPENDENT_IADLS:: CLEANING;SHOPPING;TRANSPORTATION
ADLS_ACUITY_SCORE: 58

## 2024-11-26 NOTE — PLAN OF CARE
"Goal Outcome Evaluation:      Plan of Care Reviewed With: patient    Overall Patient Progress: improvingOverall Patient Progress: improving    VS: /49 (BP Location: Left arm)   Pulse 68   Temp 98.2  F (36.8  C) (Oral)   Resp 18   Ht 1.803 m (5' 11\")   Wt 118.4 kg (261 lb 1.6 oz)   SpO2 98%   BMI 36.42 kg/m       O2: SpO2 98% ON RA   Denies chest pain and SOB.    Output: Voids spontaneously without difficulty to bathroom. Uses a urinal at bed side   Last BM: 11/25   Activity: Independent    Skin: WDL    Pain: Denies pain    CMS: Intact, Alert and oriented. Denies numbness and tingling.   Dressing: None    Diet: Regular diet. Denies nausea/vomiting.    LDA: None    Equipment: None    Plan: Precautions maintained. Continue with plan of care. Call light within reach, pt able to make needs known.    Additional Info: Slept well through the night. Does need some help with lifting his legs to bed.        "

## 2024-11-26 NOTE — PROGRESS NOTES
11/26/24 0903   Appointment Info   Signing Clinician's Name / Credentials (PT) Kaylen Nash PTA   PT Assistant Visit Number 4   Therapeutic Procedure/Exercise   Ther. Procedure: strength, endurance, ROM, flexibillity Minutes (23483) 42   Symptoms Noted During/After Treatment fatigue   Treatment Detail/Skilled Intervention PT: focus on NuStep, gait and STM and gentle lymph massage at L lateral quad to address ongoing swelling and significant scar tissue which inhibits mm activation. NuStep w/skilled set up, dependent for lifting LE onto/off of foot pedals in safe manner, use of 2 washclothes under L heel for skin protection, seat at 10, UE at 8, L7 for 10 min, repeated cues for keeping >30spm for increased CV endurance challenge as Pt tends to be very talkative and unable to maintain pace with dual tasking. no rest breaks required today. Pt able to complete.2 miles, 295 total steps, avg 22 spm, 1.7 METS. Pt refuses to amb across carpet and insists on gait activity on hard surface floor only: amb 55ft x 2, FWW, CGA with w/c brought behind for safety. Tends to utilize a slow/shuffling gait pattern, but able to follow cues for improved step length and lifting of feet today.   Therapeutic Activity   Therapeutic Activities: dynamic activities to improve functional performance Minutes (98502) 8   Treatment Detail/Skilled Intervention PT: Pt reporting w/c broken. located new w/c, Th cleaned, set up then Pt agreeable to trial. Pt reported increased comfort, safety and able to perform STS from w/c using FWW, close SBA.   PT Discharge Planning   PT Plan PT: scar tissue massage, work on safe furniture approach w/4WW for SPT stairs, gait, modified leg press?   PT Discharge Recommendation (DC Rec) home with assist;home with home care physical therapy   PT Rationale for DC Rec PT: Pt lives alone in [facility] and a farm house outside [place]. Pt refuses to consider staying at one level living in [place]   PT Brief  overview of current status PT: generally CGA for all functional activities. uses FWW and 4WW   Physical Therapy Time and Intention   Timed Code Treatment Minutes 50   Total Session Time (sum of timed and untimed services) 50   Post Acute Settings Only   What unit is patient on? TCU   PT - Transitional Care Unit Time   Individual Time (minutes) - PT 50   Group Time (minutes) - PT 0   Concurrent Time (minutes) - PT 0   Co-Treatment Time (minutes) - PT 0   TCU Total Session Time (minutes) - PT 50   TCU Daily Total Session Time   PT TCU Daily Total Session Time 50   Rehab TCU Daily Total Session Time 50

## 2024-11-26 NOTE — PLAN OF CARE
Goal Outcome Evaluation:      Plan of Care Reviewed With: patient    Overall Patient Progress: no changeOverall Patient Progress: no change       Pt alert and oriented x4. Denied chest pain, SOB, N/V or headache. Continent to both bladder and bowel needs, utilizes urinal at bedside. Had BM today per pt. Pt declined BG checks and insulin this shift, was able to take Metformin. Daily weight done this shift. Mod I with walker but with assistance on BLE due to pain and edema. BLE with tubigrips, removed this shift while patient on bed, off-loaded legs on pillows. Sacral Mepilex changed this shift. On regular diet, able to take pills whole with thin liquids, FR of 1500 mL. PRN Tylenol and Oxycodone given for pain rated as 8/10. Maintained on chemo precautions. Urinal sample collected for urinalysis. Safety measures in place. Call light within reach. Will continue POC.       Patient's most recent vital signs are:     Vital signs:  BP: 125/49  Temp: 98.2  HR: 68  RR: 18  SpO2: 98 %     Patient does not have new respiratory symptoms.  Patient does not have new sore throat.  Patient does not have a fever greater than 99.5.

## 2024-11-26 NOTE — PLAN OF CARE
Vital stable .  Last night notes reviewed .  UA negative  No nursing concerns brought forward  Will ivis paracentesis today  [facility] GI note 11/25

## 2024-11-26 NOTE — PLAN OF CARE
"VS: /50   Pulse 56   Temp 98.2  F (36.8  C) (Oral)   Resp 18   Ht 1.803 m (5' 11\")   Wt 118.4 kg (261 lb 1.6 oz)   SpO2 98%   BMI 36.42 kg/m      O2: 98% on RA   Output: Continent bowel and bladder   Last BM: 11/26/24   Activity: MOD I w/walker   Skin: Mepilex on coccyx for protection, de acccessed port a cath, BLE tubigrips for edema   Pain: Pain in L leg, given PRN oxycodone and Tylenol x1 this shift   CMS:  Neuro: Alert and oriented x4, able to make needs known.    Dressing: Mepilex on coccyx for protection   Diet: Regular diet, thin liquids, takes meds whole, on 1500mL FR, patient refuses BG checks for insulin   LDA: De accesses port a cath   Equipment: WW, GB, call light   Plan: Con't POC.    Additional Info: Patient was calm and cooperative with all cares, denies SOB and CP. Refused part of morning meds. Patient went down for paracentesis this shift. No acute issues this shift, continue POC.    Goal Outcome Evaluation:      Plan of Care Reviewed With: patient    Overall Patient Progress: no changeOverall Patient Progress: no change         Patient's most recent vital signs are:     Vital signs:  BP: 116/50  Temp: 98.2  HR: 56  RR: 18  SpO2: 98 %     Patient does not have new respiratory symptoms.  Patient does not have new sore throat.  Patient does not have a fever greater than 99.5.  "

## 2024-11-27 ENCOUNTER — APPOINTMENT (OUTPATIENT)
Dept: PHYSICAL THERAPY | Facility: SKILLED NURSING FACILITY | Age: 74
DRG: 314 | End: 2024-11-27
Attending: INTERNAL MEDICINE
Payer: COMMERCIAL

## 2024-11-27 ENCOUNTER — APPOINTMENT (OUTPATIENT)
Dept: OCCUPATIONAL THERAPY | Facility: SKILLED NURSING FACILITY | Age: 74
DRG: 314 | End: 2024-11-27
Attending: INTERNAL MEDICINE
Payer: COMMERCIAL

## 2024-11-27 ENCOUNTER — NURSE TRIAGE (OUTPATIENT)
Dept: ONCOLOGY | Facility: CLINIC | Age: 74
End: 2024-11-27
Payer: COMMERCIAL

## 2024-11-27 DIAGNOSIS — C77.8 MALIGNANT NEOPLASM METASTATIC TO LYMPH NODES OF MULTIPLE SITES (H): Primary | ICD-10-CM

## 2024-11-27 PROCEDURE — 97530 THERAPEUTIC ACTIVITIES: CPT | Mod: GP

## 2024-11-27 PROCEDURE — 99310 SBSQ NF CARE HIGH MDM 45: CPT | Performed by: INTERNAL MEDICINE

## 2024-11-27 PROCEDURE — 250N000012 HC RX MED GY IP 250 OP 636 PS 637: Performed by: STUDENT IN AN ORGANIZED HEALTH CARE EDUCATION/TRAINING PROGRAM

## 2024-11-27 PROCEDURE — 120N000009 HC R&B SNF

## 2024-11-27 PROCEDURE — 250N000013 HC RX MED GY IP 250 OP 250 PS 637: Performed by: INTERNAL MEDICINE

## 2024-11-27 PROCEDURE — 97535 SELF CARE MNGMENT TRAINING: CPT | Mod: GO

## 2024-11-27 PROCEDURE — 250N000013 HC RX MED GY IP 250 OP 250 PS 637: Performed by: STUDENT IN AN ORGANIZED HEALTH CARE EDUCATION/TRAINING PROGRAM

## 2024-11-27 RX ADMIN — PREDNISONE 5 MG: 5 TABLET ORAL at 09:29

## 2024-11-27 RX ADMIN — OXYCODONE HYDROCHLORIDE 10 MG: 5 TABLET ORAL at 03:29

## 2024-11-27 RX ADMIN — OXYCODONE HYDROCHLORIDE 10 MG: 5 TABLET ORAL at 10:59

## 2024-11-27 RX ADMIN — PANTOPRAZOLE SODIUM 40 MG: 40 TABLET, DELAYED RELEASE ORAL at 09:29

## 2024-11-27 RX ADMIN — METOPROLOL SUCCINATE 200 MG: 200 TABLET, EXTENDED RELEASE ORAL at 07:54

## 2024-11-27 RX ADMIN — ACETAMINOPHEN 650 MG: 325 TABLET, FILM COATED ORAL at 03:29

## 2024-11-27 RX ADMIN — ACETAMINOPHEN 650 MG: 325 TABLET, FILM COATED ORAL at 10:59

## 2024-11-27 RX ADMIN — ASPIRIN 81 MG CHEWABLE TABLET 81 MG: 81 TABLET CHEWABLE at 09:28

## 2024-11-27 RX ADMIN — METFORMIN HYDROCHLORIDE 500 MG: 500 TABLET, FILM COATED ORAL at 17:29

## 2024-11-27 RX ADMIN — METFORMIN HYDROCHLORIDE 500 MG: 500 TABLET, FILM COATED ORAL at 07:53

## 2024-11-27 RX ADMIN — CIPROFLOXACIN HYDROCHLORIDE 500 MG: 500 TABLET, FILM COATED ORAL at 09:28

## 2024-11-27 RX ADMIN — SPIRONOLACTONE 50 MG: 50 TABLET ORAL at 09:28

## 2024-11-27 ASSESSMENT — ACTIVITIES OF DAILY LIVING (ADL)
ADLS_ACUITY_SCORE: 62
ADLS_ACUITY_SCORE: 62
ADLS_ACUITY_SCORE: 59
ADLS_ACUITY_SCORE: 59
ADLS_ACUITY_SCORE: 62
ADLS_ACUITY_SCORE: 62
ADLS_ACUITY_SCORE: 58
ADLS_ACUITY_SCORE: 59
ADLS_ACUITY_SCORE: 58
ADLS_ACUITY_SCORE: 58
ADLS_ACUITY_SCORE: 62
ADLS_ACUITY_SCORE: 58
ADLS_ACUITY_SCORE: 59
ADLS_ACUITY_SCORE: 58
ADLS_ACUITY_SCORE: 62
ADLS_ACUITY_SCORE: 58
ADLS_ACUITY_SCORE: 62

## 2024-11-27 NOTE — TELEPHONE ENCOUNTER
Pt is calling because he has some questions about upcoming apts. He is in the TCU in [facility], and he is scheduled to be discharged on Wednesday. He has apts with Dr. Plunkett and some lab apts that are scheduled for 12/5, but he lives alone and would need to take [employer] for the apts. He is wondering if there is any way to do the appointments at TCU before discharge.    Secure chat sent to Piedad and she will call him back.    Informed pt that Piedad will call him back today.    Routed to SABINA Nowak.

## 2024-11-27 NOTE — PLAN OF CARE
"Goal Outcome Evaluation:    Plan of Care Reviewed With: patient    Overall Patient Progress: no change    Pt alert and oriented x4, uses call light appropriately. Wanted note left for providers as pt had questions about possibility of increasing spironolactone, also wanted to follow up about injection (neulasta) he receives when at infusion as he has concern that he is late in receiving it this month (per pt - last received 10/24 or 10/25 and has been receiving it monthly). No complaints of SOB, chest pain, and N/V this shift. Primapore dressings on abdomen in place and clean. Sacral mepilex in place. Refused BG checks, stating \"every person that comes through that door asks the same question and I refuse every time\" and \"I don't even want to be asked.\" No insulin provided d/t no BG check. Lotion applied to BLE and back per pt request. Tylenol and oxycodone provided x2 this shift for 8/10 pain. No other issues this shift. Will continue with plan of care.  "

## 2024-11-27 NOTE — PLAN OF CARE
Goal Outcome Evaluation:         Pt alert and oriented X4, able to make needs known. No c/o chest pain, SOB, N/V. Paracentesis done this morning. Refused BG check. Good appetite, took metformin at dinner time. Will continue with POC              Patient's most recent vital signs are:     Vital signs:  BP: 123/49  Temp: 97.9  HR: 87  RR: 16  SpO2: 96 %
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
## 2024-11-27 NOTE — TELEPHONE ENCOUNTER
[facility]: Cancer Care                                                                                          RNCC placed SW consult and sent message to social workers Neal Levin, Dede Cruz, and Tiffany Landaverde to follow-up with patient regarding transportation resources.    Received message from Tiffany Landaverde that they will take care of the transportation.    No further action required by RNCC.    Piedad SEBASTIAN RN  Cancer Care Coordinator  [facility]

## 2024-11-27 NOTE — PROGRESS NOTES
[facility] Transitional Care    Medicine Progress Note - Hospitalist Service    Date of Admission:  11/16/2024    Assessment & Plan   74-year-old male with history of HTN, metastatic prostate cancer, cirrhosis secondary to chronic HCV infection who presented to hospital (10/29 - 11/16) for a heart failure exacerbation 2/2 hypervolemia. He was started on diuretics.   Patient was also found to have SBP and received antibiotics. He also was found with RENNY and diuretic was held.   Transferred to TCU for ongoing rehab needs.      11/27  Patient wants to know when he will get 1)next Neulasta, 2)if his next hem onc appointment can be via video and 3)the labs which are needed if can be drawn in TCU before discharge .    He also verbalized understanding that he will be needing likely biweekly or weekly paracentesis and that was a source of worry for him .    He also voiced frustration over not having quality of life and wanting to be in [place] and trying to coordinate multiple medical appointments.    Dicussed in rounds      Physical deconditioning:   -Continue working with physical therapy and Occupational Therapy.     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   Ascites.    Longstanding hx of HCM, recently established care w/ [facility] for HF. Has been struggling w/ worsening BLE edema, increased abdominal distension (weeping ascites), from poorly controlled hypervolemia. PTA diuretic regimen w/ Spironolactone was deemed inadequate, admitted for more aggressive titration of diuretic regimen w/ close cardiac monitoring. Started on Bumex drip here by Cards. Repeat ECHO 10/30/24 showing hyperkinetic w/ EF 65-70%, dynamic outflow tract obstruction, peak gradient 119mmHg at rest, grade II moderate diastolic dysfunction - on most recent echo 11/8 this gradient was lower (80) indicative of probable over-estimation on prior.  Transferred to Medicine service 10/31 given a  diagnosis of SBP from ascites.  He was aggressively diuresed at the beginning of his hospitalization but subsequent developed acute kidney injury and borderline BP. His metoprolol was reduced early in his admission and resumed at home dose on 11/9.  His shortness of breath improved after restarting his metoprolol.  -His prior to admission valsartan was held during admission due to low blood pressures and dizziness.    VQ scan from 11/11/24 negative      - Continue metoprolol succinate 200 mg daily  - Continue aspirin 81 mg daily.   - Currently the patient is off diuretics due to RENNY. He had a Cr bump during the weekend, continue monitoring.   - Start Aldactone 50 mg daily.   - Continue holding Valsartan   - Avoid vasodilators  - Ranolazine was stopped d/t no improvement of his symptoms  - Follow-up daily wt. I/o. Frequent bmp. Monitor vitals.   - Monitor lytes: keep K >4, Mg >2. RN protocol prn  - Lymphedema wraps  - Elevate legs.   - He is already being coordinated for myosin inhibitors as an outpatient and he will follow up with Dr. Gonsales on an outpatient basis.      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    Primary Hepatologist: Dr. Renteria   - Continue cipro for sbp ppx  - IR consult for new therapeutic paracentesis.   - Outpatient liver team  - Monitor LFT     RENNY, suspect prerenal  CKD Stage III  Each time diuretic has been ordered he has had a significant increase in his creatinine.   - Holding loop diuretic for now. Continue monitoring.   - follow-up bmp, I.o.   - avoid nephrotoxics.      Anemia of chronic disease, + possible small volume intraperitoneal bleed following paracentesis  BRBPR 11/7  Hx of Melena.   HGB stable.      Metastatic Prostate Cancer  Chronic Pancytopenia   Initially diagnosed earlier this year incidentally  "following a fall at home, fracturing his L femur. ORIF 05/24/24 and curretage samples c/w adenocarcinoma of prostate origin. Has since followed w/ Oncology as an OP, last saw 10/24/24. PTA on Daralutamide BID, Docetaxel (C4D1 was on 10/24/24) and Leuprolide P6putdlt (last 9/5/24). Also gets Neulasta, last 10/25. Has chronic pancytopenia, likely d/t multiple comorbidities (cirrhosis, CKD) and iatrogenic (chemotherapy). Baseline hgb ~8-9 recently.   - Oncology consulted here 10/31 and recommended to continue Daralutamide.   - continue pta prednisone. 5 mg daily.   - Oncology outpt follow-up.      Type 2 Diabetes Mellitus, non-insulin-dependent, well-controlled  Last A1c 5.8% on 10/29/24.   He is refusing ssi. GI okay with metformin.     Recent Labs   Lab 11/25/24  1058 11/24/24  0537 11/23/24  2204 11/23/24  1743 11/23/24  1359 11/23/24  1024   * 105* 157* 113* 172* 142*             Diet: Regular Diet Adult  Fluid restriction 1500 ML FLUID  Snacks/Supplements Adult: Special K Bar; Between Meals  Snacks/Supplements Adult: Other; If pt would like a snack or suppelment, please send; With Meals    DVT Prophylaxis: Anti-embolisim stockings (TEDs)  Rankin Catheter: Not present  Lines: None     Cardiac Monitoring: None  Code Status: Full Code      Clinically Significant Risk Factors               # Hypoalbuminemia: Lowest albumin = 2.6 g/dL at 11/18/2024  6:41 AM, will monitor as appropriate     # Hypertension: Noted on problem list            # Obesity: Estimated body mass index is 36.28 kg/m  as calculated from the following:    Height as of this encounter: 1.803 m (5' 11\").    Weight as of this encounter: 118 kg (260 lb 2.3 oz).   # Moderate Malnutrition: based on nutrition assessment    # Financial/Environmental Concerns:           Social Drivers of Health    Tobacco Use: Medium Risk (11/25/2024)    Patient History     Smoking Tobacco Use: Former     Smokeless Tobacco Use: Never     Passive Exposure: Never        "   Disposition Plan     Medically Ready for Discharge: Anticipated in 5+ Days             Mary Muñiz MD  Hospitalist Service  [facility]  Securely message with Valentina (more info)  Text page via [email or web address] Paging/Directory   ______________________________________________________________________    Interval History   Hand off taken from Dr Marquez.  Today was the first enounter with this patient .   Patient expressed frustration with his medical condition and treatment and having to see multiple providers and labs etc     Physical Exam   Vital Signs: Temp: 98.5  F (36.9  C) Temp src: Oral BP: 107/49 Pulse: 62   Resp: 16 SpO2: 95 % O2 Device: None (Room air)    Weight: 260 lbs 2.28 oz         Alert and oriented . In no acute distress .  Chest ; Breath sounds are equal BL. No respiratory distress noted .  Cardiovascular Exam ; S1 & S2 .  GI ; Abdomen is soft and non tender. BS positive .  Skin ; edema  Psych ; Micki mood & affect.    Medical Decision Making       45 MINUTES SPENT BY ME on the date of service doing chart review, history, exam, documentation & further activities per the note.      Data

## 2024-11-27 NOTE — PROGRESS NOTES
CLINICAL NUTRITION SERVICES - REASSESSMENT NOTE     Nutrition Prescription    RECOMMENDATIONS FOR MDs/PROVIDERS TO ORDER:  ***    Malnutrition Status:    ***    Recommendations already ordered by Registered Dietitian (RD):  ***    Future/Additional Recommendations:  ***  Monitor labs, intakes, and weight trends.     EVALUATION OF THE PROGRESS TOWARD GOALS   Diet: 1500 mL Fluid Restriction and Regular  Intake/[place]: 50 - 100% of documented meals. Poorly documented over last week***  Snacks/supplements: Special K bar BID + PRN    Pt ordering (on average) 1775 kcal and 62 g protein per day per [facility]. Supplements add 360 kcal and 24 g protein daily. With documented and reported*** intakes, pt is likely meeting 60-***% minimum energy and 50-***% protein needs.     NEW FINDINGS/REVIEW OF SYSTEMS    Nutrition/GI: RD met with pt in room. Pt reports ***     Weights: Pt with limited weight history prior to admission,*** 7 lb (2.5%) weight LOSS over 1 month, 7 lb (3%) weight gain in 6 months. Pt with *** lb (***%) weight *** in *** since *** during *** admission to TCU***.   11/27/24 118 kg (260 lb 2.3 oz)   11/22/24 116.4 kg (256 lb 9.6 oz)   11/16/24 112.9 kg (248 lb 12.8 oz)    11/15/24 112.1 kg (247 lb 1.6 oz)   10/29/24 121.3 kg (267 lb 6.4 oz)    10/24/24 123.4 kg (272 lb 1.6 oz)   10/24/24 104 kg (229 lb 4.5 oz)   10/03/24 115.1 kg (253 lb 12.8 oz)   09/24/24 104.8 kg (231 lb)   09/23/24 96.6 kg (213 lb)   09/05/24 104.8 kg (231 lb 0.7 oz)   08/27/24 104.8 kg (231 lb)   08/19/24 104.8 kg (231 lb)   08/14/24 106.5 kg (234 lb 11.2 oz)   07/18/24 104.8 kg (231 lb 0.7 oz)   07/08/24 104.8 kg (231 lb)   06/26/24 107.1 kg (236 lb 3.2 oz)   05/27/24 114.9 kg (253 lb 4.8 oz)   05/17/24 112 kg (247 lb)   04/30/24 115 kg (253 lb 8 oz)   04/16/24 117.6 kg (259 lb 4.8 oz)   03/05/24 116.6 kg (257 lb)      Labs reviewed   11/18/24 06:41 11/21/24 06:56 11/23/24 10:24 11/24/24 05:37 11/25/24 10:58   Sodium 144 140 145 142  143   Potassium 4.5 4.0 4.1 3.8 4.0   Urea Nitrogen 43.8 (H) 40.8 (H) 40.6 (H) 41.9 (H) 37.0 (H)   Creatinine 1.49 (H) 1.31 (H) 1.38 (H) 1.51 (H) 1.42 (H)   Glucose 126 (H) 98 142 (H) 105 (H) 116 (H)      07/03/24 15:11 10/29/24 15:58   Hemoglobin A1C 5.6 5.8 (H)     Medications reviewed: Citracal, Cipro, metformin, protonix, prednisone, spironolactone, oxycodone PRN  IV Fluids over last 7 days: No    MALNUTRITION  % Intake: {erintake:085194}  % Weight Loss: {%Weight loss:413368}  Subcutaneous Fat Loss: {ERfatlossmalnutrition:369952}  Muscle Loss: {ERmusclelossmalnutrition:713945}  Fluid Accumulation/Edema: Unable to assess  Malnutrition Diagnosis: ***    Previous Goals   Patient to consume % of nutritionally adequate meal trays TID, or the equivalent with supplements/snacks.  Evaluation: { :692945}    Previous Nutrition Diagnosis  Inadequate oral intake related to decreased appetite, nausea, menu fatigue, as evidenced by pt report.   Evaluation: { :464586}    CURRENT NUTRITION DIAGNOSIS  { :396633} related to *** as evidenced by ***      INTERVENTIONS  Implementation  { :957612}   { :327057}     Goals  Patient to consume % of nutritionally adequate meal trays TID, or the equivalent with supplements/snacks.    Monitoring/Evaluation  Progress toward goals will be monitored and evaluated per protocol.    Marta Wheatley MS, RDN, LD  TCU/OB/Ortho Clinical Dietitian  Available via phone and Vocera  Phone: [phone]  Vocera: TCU Clinical Dietitian  Weekend/Holiday Vocera: Weekend Holiday Clinical Dietitian [Multi Site Groups]

## 2024-11-27 NOTE — PROGRESS NOTES
11/27/24 0817   Appointment Info   Signing Clinician's Name / Credentials (PT) Kaylen Nash PTA   PT Assistant Visit Number 5   Physical Therapy Goals   PT Predicted Duration/Target Date for Goal Attainment 12/03/24  (extended to focus on safety w/functional transfers, stairs and bed mobility to decrease fall risk and increase functional IND.)   Therapeutic Activity   Therapeutic Activities: dynamic activities to improve functional performance Minutes (71446) 45   Treatment Detail/Skilled Intervention PT: focus on safety w/SPT w/4WW as Pt tends to retro fall into chair/bed d/t poor mechanics, STM to address edema and scar tissue at L hip, facilitated discussion on safe d/c plan & date, and functional gait w/4WW. FAcilitated discussion on safe d/c plan. Pt verbalized continued difficulty w/safety on SPT using 4WW, unable to lift LLE into bed and needing to verify insurance coverage for 4WW. Th educated Pt to call insurance to verify coverage for 4WW. Pt verbalized understanding. Pt agreeable to last therapy day of 12/3 with d/c from facility on 12/4. amb chair to chair bouts w/4WW w/focus on safe furniture approach, use of brakes, use of slow velocity, feeling for chair on back of legs. With mass practice, Pt improved approach, safety, mechanics, requiring CGA/SBA and ~50% cues. STM and gentle edema massage to address LLE edema and break up scar tissue at surgical area at L hip which impairs movement and increases fall risk. Pt reporting decreased pain and improved ROM. amb from room <> therapy gym: ~110ft x 2, 4WW while performing turns, navigating around objects and crossing different surfaces.   PT Discharge Planning   [facility] in [facility] coverage and order, stairs, bed mobility w/leg  (if OT has not performed). gait, safety w/SPT using 4WW.   PT Discharge Recommendation (DC Rec) home with assist;home with home care physical therapy   PT Rationale for DC Rec PT: Pt  lives alone in [facility] and a farm house outside [place]. Pt refuses to consider staying at one level living in [place]   PT Brief overview of current status PT: generally CGA for all functional activities. uses FWW and 4WW   Physical Therapy Time and Intention   Timed Code Treatment Minutes 45   Total Session Time (sum of timed and untimed services) 45   Post Acute Settings Only   What unit is patient on? TCU   PT - Transitional Care Unit Time   Individual Time (minutes) - PT 45   Group Time (minutes) - PT 0   Concurrent Time (minutes) - PT 0   Co-Treatment Time (minutes) - PT 0   TCU Total Session Time (minutes) - PT 45   TCU Daily Total Session Time   PT TCU Daily Total Session Time 45   Rehab TCU Daily Total Session Time 45   Transfers: Chair/Bed transfers   Describe Performance PT: See TA   Walk 50 Feet with Two Turns - Ability to walk at least 50 feet.   Describe Performance PT: see TA

## 2024-11-27 NOTE — PLAN OF CARE
Goal Outcome Evaluation:      Plan of Care Reviewed With: patient    Overall Patient Progress: no change  Orientation: Alert and oriented x4. Able to make needs known to staff.   Bowel & Bladder: Continent of both bowel and bladder. Voids spontaneously without difficulty.  Medications: Takes medication whole with thin liquids.  Pain: Pain managed with PRN oxycodone and acetaminophen which was effective.  Ambulation/Transfers: mod I w/ walker.  Diet: Regular diet w/ a fluid restriction of 1500mL  Lines: None     Oxygen: Room air  Skin: Dressing to left flank from paracentesis is CDI.   Infection/Isolation: No active isolations.  Safety: No concerns noted this shift.   Other: Declines all blood glucose checks. Provider updated. Denied chest pain, N&V, and SOB. Call-light within reach. Utilizes call-light appropriately. Continue with POC.

## 2024-11-28 LAB
ANION GAP SERPL CALCULATED.3IONS-SCNC: 6 MMOL/L (ref 7–15)
BUN SERPL-MCNC: 34 MG/DL (ref 8–23)
CALCIUM SERPL-MCNC: 8 MG/DL (ref 8.8–10.4)
CHLORIDE SERPL-SCNC: 113 MMOL/L (ref 98–107)
CREAT SERPL-MCNC: 1.24 MG/DL (ref 0.67–1.17)
EGFRCR SERPLBLD CKD-EPI 2021: 61 ML/MIN/1.73M2
ERYTHROCYTE [DISTWIDTH] IN BLOOD BY AUTOMATED COUNT: 18.6 % (ref 10–15)
GLUCOSE SERPL-MCNC: 89 MG/DL (ref 70–99)
HCO3 SERPL-SCNC: 24 MMOL/L (ref 22–29)
HCT VFR BLD AUTO: 29 % (ref 40–53)
HGB BLD-MCNC: 9.3 G/DL (ref 13.3–17.7)
MCH RBC QN AUTO: 33.9 PG (ref 26.5–33)
MCHC RBC AUTO-ENTMCNC: 32.1 G/DL (ref 31.5–36.5)
MCV RBC AUTO: 106 FL (ref 78–100)
PLATELET # BLD AUTO: 45 10E3/UL (ref 150–450)
POTASSIUM SERPL-SCNC: 4.2 MMOL/L (ref 3.4–5.3)
RBC # BLD AUTO: 2.74 10E6/UL (ref 4.4–5.9)
SODIUM SERPL-SCNC: 143 MMOL/L (ref 135–145)
WBC # BLD AUTO: 2.6 10E3/UL (ref 4–11)

## 2024-11-28 PROCEDURE — 250N000013 HC RX MED GY IP 250 OP 250 PS 637: Performed by: INTERNAL MEDICINE

## 2024-11-28 PROCEDURE — 36415 COLL VENOUS BLD VENIPUNCTURE: CPT | Performed by: INTERNAL MEDICINE

## 2024-11-28 PROCEDURE — 250N000013 HC RX MED GY IP 250 OP 250 PS 637: Performed by: STUDENT IN AN ORGANIZED HEALTH CARE EDUCATION/TRAINING PROGRAM

## 2024-11-28 PROCEDURE — 80051 ELECTROLYTE PANEL: CPT | Performed by: INTERNAL MEDICINE

## 2024-11-28 PROCEDURE — 120N000009 HC R&B SNF

## 2024-11-28 PROCEDURE — 85041 AUTOMATED RBC COUNT: CPT | Performed by: INTERNAL MEDICINE

## 2024-11-28 PROCEDURE — 250N000012 HC RX MED GY IP 250 OP 636 PS 637: Performed by: STUDENT IN AN ORGANIZED HEALTH CARE EDUCATION/TRAINING PROGRAM

## 2024-11-28 PROCEDURE — 80048 BASIC METABOLIC PNL TOTAL CA: CPT | Performed by: INTERNAL MEDICINE

## 2024-11-28 PROCEDURE — 85014 HEMATOCRIT: CPT | Performed by: INTERNAL MEDICINE

## 2024-11-28 RX ADMIN — METFORMIN HYDROCHLORIDE 500 MG: 500 TABLET, FILM COATED ORAL at 08:08

## 2024-11-28 RX ADMIN — PREDNISONE 5 MG: 5 TABLET ORAL at 10:24

## 2024-11-28 RX ADMIN — CIPROFLOXACIN HYDROCHLORIDE 500 MG: 500 TABLET, FILM COATED ORAL at 10:24

## 2024-11-28 RX ADMIN — PANTOPRAZOLE SODIUM 40 MG: 40 TABLET, DELAYED RELEASE ORAL at 10:24

## 2024-11-28 RX ADMIN — Medication 950 MG: at 16:56

## 2024-11-28 RX ADMIN — SPIRONOLACTONE 50 MG: 50 TABLET ORAL at 10:24

## 2024-11-28 RX ADMIN — OXYCODONE HYDROCHLORIDE 10 MG: 5 TABLET ORAL at 18:25

## 2024-11-28 RX ADMIN — METFORMIN HYDROCHLORIDE 500 MG: 500 TABLET, FILM COATED ORAL at 16:56

## 2024-11-28 RX ADMIN — METOPROLOL SUCCINATE 200 MG: 200 TABLET, EXTENDED RELEASE ORAL at 08:09

## 2024-11-28 RX ADMIN — ASPIRIN 81 MG CHEWABLE TABLET 81 MG: 81 TABLET CHEWABLE at 10:24

## 2024-11-28 RX ADMIN — ACETAMINOPHEN 650 MG: 325 TABLET, FILM COATED ORAL at 18:25

## 2024-11-28 ASSESSMENT — ACTIVITIES OF DAILY LIVING (ADL)
ADLS_ACUITY_SCORE: 59
ADLS_ACUITY_SCORE: 62
ADLS_ACUITY_SCORE: 59
ADLS_ACUITY_SCORE: 62
ADLS_ACUITY_SCORE: 59
ADLS_ACUITY_SCORE: 59
ADLS_ACUITY_SCORE: 62
ADLS_ACUITY_SCORE: 59
ADLS_ACUITY_SCORE: 62
ADLS_ACUITY_SCORE: 62
ADLS_ACUITY_SCORE: 59
ADLS_ACUITY_SCORE: 62
ADLS_ACUITY_SCORE: 59
ADLS_ACUITY_SCORE: 62
ADLS_ACUITY_SCORE: 59
ADLS_ACUITY_SCORE: 59
ADLS_ACUITY_SCORE: 62
ADLS_ACUITY_SCORE: 59

## 2024-11-28 NOTE — PLAN OF CARE
"Goal Outcome Evaluation:    Plan of Care Reviewed With: patient    Overall Patient Progress: no change    Outcome Evaluation: Pt continues to be Mod I walker in the room and in the toilet. He is continent of both BL and BM, urinal within reach, LBM-11/26/24.    Around 2120, pt refused pain medications offered to him, he said to the writer, \"I don't need those medicines anymore, you go and take your break\".  \"I would like to talk to the CN!\". Writer replied, \" I am the CN, do you want to talk about it? What have I done wrong to make you angry? Let's talk it out.\" The pt replied, \" No, I don't want to talk with you anymore, I will just talk with the incoming CN\". Writer also offered to remove his tubi  but he declined. Thus, writer left the patient so he will mellow down. From that time on,he did not call back again. Pt was also mad with a support staff who tried to help him. Pt care supervisor made aware through an email.    Will continue to monitor. Endorsed to NOC nurse.          "

## 2024-11-28 NOTE — PLAN OF CARE
Goal Outcome Evaluation:      Plan of Care Reviewed With: patient    Overall Patient Progress: no change  Orientation: Alert and oriented x4. Able to make needs known to staff.   Bowel & Bladder: Continent of both bowel and bladder. Voids spontaneously without difficulty.  Medications: Takes medication whole with thin liquids.  Pain: No intervention required this shift  Ambulation/Transfers: with one assist w/ walker and gait belt.  Diet: Regular diet w/ a fluid restriction of 1500mL.  Lines: None     Oxygen: Room air  Skin: Dressing to left flank paracentesis site is CDI. Did not want to lie sideways to assess coccyx. Patient stated he was in a  bad mood today and did not sleep well so does not want to be bothered.  Infection/Isolation: No active isolations.  Safety: No concerns noted this shift.   Other: Denied chest pain, N&V, and SOB. Call-light within reach. Utilizes call-light appropriately. Continue with POC.

## 2024-11-28 NOTE — PROGRESS NOTES
MDS Pain Assessment    The following is the pain interview as conducted by the TCU RN caring for the patient on November 28, 2024. This assessment is required by the [facility] for all patients in [place] SNF (Skilled Nursing Facilities).     . Pain Presence  Have you had pain or hurting at any time in the last 5 days?   1. Yes    . Pain Frequency  How much of the time have you experienced pain or hurting over the last 5 days?   2. Occasionally    . Pain Effect on Sleep  Over the past 5 days, how much of the time has pain made it hard for you to sleep at night?   1. Rarely or not at all    . Pain Interference with Therapy Activities  Over the past 5 days, how often have you limited your participation in rehabilitation therapy sessions due to pain?   1. Rarely or not at all    . Pain Interference with Day-to-Day Activities  Over the past 5 days, have you limited your day-to-day activities (excluding rehabilitation therapy sessions) because of pain?  1. Rarely or not at all    . Pain intensity   Numeric Rating Scale (00-10): Please rate your worst pain over the last 5 days on a zero to ten scale, with zero being no pain and ten as the worst pain you can imagine. 08

## 2024-11-29 ENCOUNTER — APPOINTMENT (OUTPATIENT)
Dept: OCCUPATIONAL THERAPY | Facility: SKILLED NURSING FACILITY | Age: 74
DRG: 314 | End: 2024-11-29
Attending: INTERNAL MEDICINE
Payer: COMMERCIAL

## 2024-11-29 ENCOUNTER — APPOINTMENT (OUTPATIENT)
Dept: PHYSICAL THERAPY | Facility: SKILLED NURSING FACILITY | Age: 74
DRG: 314 | End: 2024-11-29
Attending: INTERNAL MEDICINE
Payer: COMMERCIAL

## 2024-11-29 PROCEDURE — 120N000009 HC R&B SNF

## 2024-11-29 PROCEDURE — 97110 THERAPEUTIC EXERCISES: CPT | Mod: GP

## 2024-11-29 PROCEDURE — 97535 SELF CARE MNGMENT TRAINING: CPT | Mod: GO

## 2024-11-29 PROCEDURE — 250N000013 HC RX MED GY IP 250 OP 250 PS 637: Performed by: INTERNAL MEDICINE

## 2024-11-29 PROCEDURE — 97530 THERAPEUTIC ACTIVITIES: CPT | Mod: GP

## 2024-11-29 PROCEDURE — 250N000013 HC RX MED GY IP 250 OP 250 PS 637: Performed by: STUDENT IN AN ORGANIZED HEALTH CARE EDUCATION/TRAINING PROGRAM

## 2024-11-29 PROCEDURE — 250N000012 HC RX MED GY IP 250 OP 636 PS 637: Performed by: STUDENT IN AN ORGANIZED HEALTH CARE EDUCATION/TRAINING PROGRAM

## 2024-11-29 RX ADMIN — PREDNISONE 5 MG: 5 TABLET ORAL at 08:19

## 2024-11-29 RX ADMIN — METFORMIN HYDROCHLORIDE 500 MG: 500 TABLET, FILM COATED ORAL at 17:16

## 2024-11-29 RX ADMIN — Medication 950 MG: at 17:16

## 2024-11-29 RX ADMIN — ASPIRIN 81 MG CHEWABLE TABLET 81 MG: 81 TABLET CHEWABLE at 08:19

## 2024-11-29 RX ADMIN — PANTOPRAZOLE SODIUM 40 MG: 40 TABLET, DELAYED RELEASE ORAL at 08:20

## 2024-11-29 RX ADMIN — METFORMIN HYDROCHLORIDE 500 MG: 500 TABLET, FILM COATED ORAL at 08:17

## 2024-11-29 RX ADMIN — ACETAMINOPHEN 650 MG: 325 TABLET, FILM COATED ORAL at 21:42

## 2024-11-29 RX ADMIN — SPIRONOLACTONE 50 MG: 50 TABLET ORAL at 08:18

## 2024-11-29 RX ADMIN — METOPROLOL SUCCINATE 200 MG: 200 TABLET, EXTENDED RELEASE ORAL at 08:17

## 2024-11-29 RX ADMIN — CIPROFLOXACIN HYDROCHLORIDE 500 MG: 500 TABLET, FILM COATED ORAL at 08:19

## 2024-11-29 RX ADMIN — OXYCODONE HYDROCHLORIDE 10 MG: 5 TABLET ORAL at 21:43

## 2024-11-29 ASSESSMENT — ACTIVITIES OF DAILY LIVING (ADL)
ADLS_ACUITY_SCORE: 59

## 2024-11-29 NOTE — PROGRESS NOTES
"   11/29/24 0848   Appointment Info   Signing Clinician's Name / Credentials (PT) Kaylen Nash PTA   PT Assistant Visit Number 6   Therapeutic Procedure/Exercise   Ther. Procedure: strength, endurance, ROM, flexibillity Minutes (74189) 17   Symptoms Noted During/After Treatment fatigue   Treatment Detail/Skilled Intervention PT: focus on BLE strength and STM to L hip/thigh to address edema and breaking up scar tissue. To address edema and scar tissue which inhibits mm activation and increases L hip pain. Pt asking about hiring someone for at home but refusing to \"hire an organization. I prefer to hire an individual\". Pt reports increased comfort and less pain at L hip after STM. Seated BLE strength w/seated leg press from w/c using step w/bolster, min A to place/remove LEs, A x 1 to brace w/c, cues for full extention and eccentric control with flexion for 1 bout x 8 reps.   Therapeutic Activity   Therapeutic Activities: dynamic activities to improve functional performance Minutes (50643) 28   Symptoms Noted During/After Treatment Fatigue   Treatment Detail/Skilled Intervention PT: focus on education of NOMNOC and facilitated discussion of d/c date. Pt claiming a \"tall, skinny lady\" came to discuss discharge and may let me stay through Fri of next week.\" Pt unable to recall Pt's name. Updated supervising PT. Offered bed mobility w/use of leg  since Pt has previously reported difficulty lifting LLE into bed. Pt refused stated \"I can do that. I did it all weekend\". Noted Nsg notes state that Pt needs A to lift legs into bed. focus on stairs and functional transfers. see gg...   PT Discharge Planning   PT Plan PT: progress stairs, STM at L hip, bed mobility w/leg , gait, safe furniture approach w/4WW. order 4WW from [facility] (Nova, star 8 DX HD) for safe d/c   PT Discharge Recommendation (DC Rec) home with assist;home with home care physical therapy   PT Rationale for DC Rec PT: Pt lives alone " "in [employer] and a farm house outside [place]. Pt refuses to consider staying at one level living in [place]   PT Brief overview of current status PT: generally CGA for all functional activities. uses FWW and 4WW   PT Equipment Needed at Discharge walker, rolling  (wants same model as in room Nova, star 8 DX HD)   Physical Therapy Time and Intention   Timed Code Treatment Minutes 45   Total Session Time (sum of timed and untimed services) 45   Post Acute Settings Only   What unit is patient on? TCU   PT - Transitional Care Unit Time   Individual Time (minutes) - PT 45   Group Time (minutes) - PT 0   Concurrent Time (minutes) - PT 0   Co-Treatment Time (minutes) - PT 0   TCU Total Session Time (minutes) - PT 45   TCU Daily Total Session Time   PT TCU Daily Total Session Time 45   Rehab TCU Daily Total Session Time 45   Transfers: Chair/Bed transfers   Patient Performance Steadying Assist   Staff Performance Set up help only   Equipment Used Rolling walker   Describe Performance PT: SPT, FWW, CGA with occasional cues for safety and control of velocity with turns and eccentric control when sitting. Pt improving with repetition   Walk 10 Feet - Ability to walk once standing   Reason Not Done Resident refused to perform   Wheel 50 Feet - Ability to move wheelchair/scooter   Reason Not Done Resident refused to perform   4 Steps - Ability to go up/down steps.   Patient Performance Steadying Assist   Describe Performance PT: ascend/descend 3 - 6\" steps, B HR, step to pattern, heavy reliance on BUE, cues for safety and improved mechanics. Pt reports increased pain in L hip despite cues for mechanics.       "

## 2024-11-29 NOTE — PLAN OF CARE
Goal Outcome Evaluation:      Plan of Care Reviewed With: patient    Overall Patient Progress: improvingOverall Patient Progress: improving    FOCUS/GOAL     Bowel management, Bladder management, Medication management, Wound care management, Medical management, Mobility, Skin integrity, and Safety management     ASSESSMENT, INTERVENTIONS AND CONTINUING PLAN FOR GOAL:     Pt is A&OX4, calm, & cooperative with care. Denied CP, SOB, & n/v. VSS & on RA. Pt is MOD I with walker but requests assist to get legs up on the bed. Pt spent couple hours on a recliner. Continent for both B&B. LBM this shift with; greenish stool. On a regular diet with a FR of 1500 mL. Takes meds whole with thin liquid. Managed L hip, lower back, & abd pain with PRN oxycodone and tylenol. Dressing to the paracentesis site on the L flank CDI. Pt refuses all blood sugar checks & insulin coverage; except metformin tablet. Pt ate dinner brought by pt's friend & no other acute concern. Able to make needs known & call light within reach. Continue with plan of care.    Patient's most recent vital signs are:     Vital signs:  BP: 122/53  Temp: 98.6  HR: 65  RR: 17  SpO2: 96 %     Patient does not have new respiratory symptoms.  Patient does not have new sore throat.  Patient does not have a fever greater than 99.5.

## 2024-11-29 NOTE — PLAN OF CARE
Goal Outcome Evaluation:      Plan of Care Reviewed With: patient    Overall Patient Progress: no changeOverall Patient Progress: no change    FOCUS/GOAL     Bowel management, Bladder management, Medication management, Wound care management, Medical management, Mobility, Skin integrity, and Safety management     ASSESSMENT, INTERVENTIONS AND CONTINUING PLAN FOR GOAL:     Pt is A&OX4, calm, & cooperative with care. Denied CP, SOB, & n/v. Pt is MOD I with walker but requests assist to get legs up on the bed. Continent for both B&B. On a regular diet with a FR of 1500 mL. Takes meds whole with thin liquid. Dressing to the paracentesis site on the L flank CDI. Pt refuses all blood sugar checks. Pt slept well for most of the shift & no other acute concern. Able to make needs known & call light within reach. Continue with plan of care.

## 2024-11-29 NOTE — PLAN OF CARE
Patient declines BG testing despite    Will stop BG check as per his request and his frustration with staff when they attempt to get it   Vitals stable  Labs 11/28 noted   Reached out to Dr Plunkett via epic messaging about patient request to be seen via video and labs etc

## 2024-11-29 NOTE — CARE PLAN
RN: BG checks and insulin discontinued as pt has been consistently refusing these. Denies pain.  Mod I in room without problems. Declines skin check. Irritable at times. No cardiac adverse sx and no resp distress. Appetite good.     Patient's most recent vital signs are:     Vital signs:  BP: 140/52  Temp: 98.2  HR: 70  RR: 17  SpO2: 98 %     Patient does not have new respiratory symptoms.  Patient does not have new sore throat.  Patient does not have a fever greater than 99.5.

## 2024-11-30 PROCEDURE — 120N000009 HC R&B SNF

## 2024-11-30 PROCEDURE — 250N000012 HC RX MED GY IP 250 OP 636 PS 637: Performed by: STUDENT IN AN ORGANIZED HEALTH CARE EDUCATION/TRAINING PROGRAM

## 2024-11-30 PROCEDURE — 250N000013 HC RX MED GY IP 250 OP 250 PS 637: Performed by: INTERNAL MEDICINE

## 2024-11-30 PROCEDURE — 250N000013 HC RX MED GY IP 250 OP 250 PS 637: Performed by: STUDENT IN AN ORGANIZED HEALTH CARE EDUCATION/TRAINING PROGRAM

## 2024-11-30 RX ADMIN — METFORMIN HYDROCHLORIDE 500 MG: 500 TABLET, FILM COATED ORAL at 17:04

## 2024-11-30 RX ADMIN — ACETAMINOPHEN 650 MG: 325 TABLET, FILM COATED ORAL at 17:07

## 2024-11-30 RX ADMIN — PREDNISONE 5 MG: 5 TABLET ORAL at 08:03

## 2024-11-30 RX ADMIN — METFORMIN HYDROCHLORIDE 500 MG: 500 TABLET, FILM COATED ORAL at 08:04

## 2024-11-30 RX ADMIN — CIPROFLOXACIN HYDROCHLORIDE 500 MG: 500 TABLET, FILM COATED ORAL at 08:04

## 2024-11-30 RX ADMIN — ACETAMINOPHEN 650 MG: 325 TABLET, FILM COATED ORAL at 02:57

## 2024-11-30 RX ADMIN — Medication 950 MG: at 17:04

## 2024-11-30 RX ADMIN — ASPIRIN 81 MG CHEWABLE TABLET 81 MG: 81 TABLET CHEWABLE at 08:04

## 2024-11-30 RX ADMIN — OXYCODONE HYDROCHLORIDE 10 MG: 5 TABLET ORAL at 02:57

## 2024-11-30 RX ADMIN — SPIRONOLACTONE 50 MG: 50 TABLET ORAL at 08:04

## 2024-11-30 RX ADMIN — OXYCODONE HYDROCHLORIDE 10 MG: 5 TABLET ORAL at 17:07

## 2024-11-30 RX ADMIN — PANTOPRAZOLE SODIUM 40 MG: 40 TABLET, DELAYED RELEASE ORAL at 08:03

## 2024-11-30 RX ADMIN — METOPROLOL SUCCINATE 200 MG: 200 TABLET, EXTENDED RELEASE ORAL at 08:04

## 2024-11-30 ASSESSMENT — ACTIVITIES OF DAILY LIVING (ADL)
ADLS_ACUITY_SCORE: 62
ADLS_ACUITY_SCORE: 59
ADLS_ACUITY_SCORE: 59
ADLS_ACUITY_SCORE: 62
ADLS_ACUITY_SCORE: 59
ADLS_ACUITY_SCORE: 62
ADLS_ACUITY_SCORE: 62
ADLS_ACUITY_SCORE: 59
ADLS_ACUITY_SCORE: 62
ADLS_ACUITY_SCORE: 59
ADLS_ACUITY_SCORE: 62
ADLS_ACUITY_SCORE: 59
ADLS_ACUITY_SCORE: 62

## 2024-11-30 NOTE — PLAN OF CARE
Orientation: Alert and oriented x4. Able to make needs known to staff.   Bowel & Bladder: Continent of both bowel and bladder. Voids spontaneously without difficulty.  Medications: Takes medication whole with thin liquids.  Pain: No intervention this shift  Ambulation/Transfers: independently w/ walker.  Diet: Regular diet with good  appetite.  Lines: None     Oxygen: Room air  Skin: Declined skin check  Infection/Isolation: No active isolations.  Safety: No concerns noted this shift.   Other: Denied chest pain, N&V, and SOB. Call-light within reach. Utilizes call-light appropriately. Continue with POC.

## 2024-11-30 NOTE — PLAN OF CARE
VS:       Pt A/O X 4. Afebrile. VSS. Lungs clear bilaterally. Denies nausea, shortness of breath, and chest pain.     Output:       Bowels active & audible in all four quadrants. Voids spontaneously without difficulty in the bathroom. LBM 11/28.     Activity:       Pt not OOB this shift. Pt is Mod I with walker.     Skin:   Dependent edema, blanchable redness on buttock.     Pain:       Has pain in the L hip, lower back, and abdomen managed with PRN oxycodone & tylenol.     CMS:       Pt reports baseline numbness and tingling in all extremities.      Dressing:       Buttock mepilex was taken off at noon by patient per pt report, new dressing applied.      Diet:       Pt is on a regular diet with 1500 mL fluid restriction. Takes meds whole with thin liquid.     LDA:       No IV access. On room air.     Equipment:       Pt refused compression socks at this time.     Plan:       Pt is able to make needs known and the call light is within the pt's reach. Continue to monitor pain.       Additional Info:       No acute changes this shift.         Plan of Care Reviewed With: patient    Overall Patient Progress: no change

## 2024-12-01 DIAGNOSIS — I42.1 HOCM (HYPERTROPHIC OBSTRUCTIVE CARDIOMYOPATHY) (H): ICD-10-CM

## 2024-12-01 DIAGNOSIS — I50.32 CHRONIC DIASTOLIC HEART FAILURE (H): Primary | ICD-10-CM

## 2024-12-01 PROCEDURE — 250N000012 HC RX MED GY IP 250 OP 636 PS 637: Performed by: STUDENT IN AN ORGANIZED HEALTH CARE EDUCATION/TRAINING PROGRAM

## 2024-12-01 PROCEDURE — 250N000013 HC RX MED GY IP 250 OP 250 PS 637: Performed by: INTERNAL MEDICINE

## 2024-12-01 PROCEDURE — 250N000013 HC RX MED GY IP 250 OP 250 PS 637: Performed by: STUDENT IN AN ORGANIZED HEALTH CARE EDUCATION/TRAINING PROGRAM

## 2024-12-01 PROCEDURE — 120N000009 HC R&B SNF

## 2024-12-01 RX ADMIN — OXYCODONE HYDROCHLORIDE 5 MG: 5 TABLET ORAL at 14:06

## 2024-12-01 RX ADMIN — METOPROLOL SUCCINATE 200 MG: 200 TABLET, EXTENDED RELEASE ORAL at 08:11

## 2024-12-01 RX ADMIN — PREDNISONE 5 MG: 5 TABLET ORAL at 08:12

## 2024-12-01 RX ADMIN — Medication 950 MG: at 16:45

## 2024-12-01 RX ADMIN — PANTOPRAZOLE SODIUM 40 MG: 40 TABLET, DELAYED RELEASE ORAL at 08:12

## 2024-12-01 RX ADMIN — OXYCODONE HYDROCHLORIDE 10 MG: 5 TABLET ORAL at 20:01

## 2024-12-01 RX ADMIN — CIPROFLOXACIN HYDROCHLORIDE 500 MG: 500 TABLET, FILM COATED ORAL at 08:12

## 2024-12-01 RX ADMIN — METFORMIN HYDROCHLORIDE 500 MG: 500 TABLET, FILM COATED ORAL at 08:12

## 2024-12-01 RX ADMIN — ACETAMINOPHEN 650 MG: 325 TABLET, FILM COATED ORAL at 20:02

## 2024-12-01 RX ADMIN — SPIRONOLACTONE 50 MG: 50 TABLET ORAL at 08:12

## 2024-12-01 RX ADMIN — ASPIRIN 81 MG CHEWABLE TABLET 81 MG: 81 TABLET CHEWABLE at 08:12

## 2024-12-01 RX ADMIN — METFORMIN HYDROCHLORIDE 500 MG: 500 TABLET, FILM COATED ORAL at 18:33

## 2024-12-01 RX ADMIN — OXYCODONE HYDROCHLORIDE 5 MG: 5 TABLET ORAL at 12:43

## 2024-12-01 RX ADMIN — ACETAMINOPHEN 650 MG: 325 TABLET, FILM COATED ORAL at 12:43

## 2024-12-01 ASSESSMENT — ACTIVITIES OF DAILY LIVING (ADL)
ADLS_ACUITY_SCORE: 62

## 2024-12-01 NOTE — PLAN OF CARE
AOX4, Mod I in room, Cont of B&B.  Denies CP and SOB, VSS at RA>  PRN pain management was effective.  No acute event noted during shift. Will continue to monitor.

## 2024-12-01 NOTE — PLAN OF CARE
VS:        Pt A/O X 4. Afebrile. VSS. Denies nausea, shortness of breath, and chest pain.      Output:         Voids spontaneously without difficulty in the bathroom. Continent of bowel & bladder. LBM 11/28.      Activity:        Pt not OOB this shift. Pt is Mod I with walker.      Skin:    Dependent edema, blanchable redness on buttock.      Pain:        Has pain in the L hip, lower back, and abdomen managed with PRN oxycodone & tylenol not given this shift.      CMS:        Pt reports baseline numbness and tingling in lower extremities.      Dressing:        Refused assessment of buttock.      Diet:        Pt is on a regular diet with 1500 mL fluid restriction. Takes meds whole with thin liquid.      LDA:        No IV access. On room air.      Equipment:        Pt refused compression socks.      Plan:        Pt is able to make needs known and the call light is within the pt's reach. Continue to monitor pain.       Additional Info:        No acute changes this shift.            Plan of Care Reviewed With: patient    Overall Patient Progress: no change

## 2024-12-01 NOTE — PLAN OF CARE
Orientation: Alert and oriented x4. Able to make needs known to staff.   Bowel & Bladder: Continent of both bowel and bladder. Voids spontaneously without difficulty. Urinal at bedside  Medications: Takes medication whole with thin liquids.  Pain: No intervention this shift  Ambulation/Transfers: independently w/ walker. Assisted by NA to walk hallways this shift  Diet: Regular diet with good  appetite.  Lines: None     Oxygen: Room air  Skin: Declined skin check  Infection/Isolation: No active isolations.  Safety: No concerns noted this shift.   Other: Has appt tomorrow for diabetic shoe fitting. Scheduled own ride. Stated his ride will be here at 1100. Denied chest pain, N&V, and SOB. Call-light within reach. Utilizes call-light appropriately. Continue with POC.

## 2024-12-01 NOTE — DISCHARGE SUMMARY
Occupational Therapy Discharge Summary    Reason for therapy discharge:    Pt remains on TCU until next week and he con't with PT but OT Dc'd as goals met in this setting.  Pt has BR and ADL equipment at home, no additional AE needs identified. If pt has coverage and chooses OT therapy at discharge, recommend he work on increasing IADL skills in addition to increasing support.

## 2024-12-02 ENCOUNTER — TELEPHONE (OUTPATIENT)
Dept: FAMILY MEDICINE | Facility: CLINIC | Age: 74
End: 2024-12-02
Payer: COMMERCIAL

## 2024-12-02 DIAGNOSIS — Z98.890 HISTORY OF REPAIR OF HIP FRACTURE: Primary | ICD-10-CM

## 2024-12-02 LAB
ALBUMIN SERPL BCG-MCNC: 2.7 G/DL (ref 3.5–5.2)
ALP SERPL-CCNC: 139 U/L (ref 40–150)
ALT SERPL W P-5'-P-CCNC: 23 U/L (ref 0–70)
ANION GAP SERPL CALCULATED.3IONS-SCNC: 6 MMOL/L (ref 7–15)
AST SERPL W P-5'-P-CCNC: 35 U/L (ref 0–45)
BILIRUB DIRECT SERPL-MCNC: 0.24 MG/DL (ref 0–0.3)
BILIRUB SERPL-MCNC: 1 MG/DL
BUN SERPL-MCNC: 31.9 MG/DL (ref 8–23)
CALCIUM SERPL-MCNC: 8.3 MG/DL (ref 8.8–10.4)
CHLORIDE SERPL-SCNC: 110 MMOL/L (ref 98–107)
CREAT SERPL-MCNC: 1.33 MG/DL (ref 0.67–1.17)
EGFRCR SERPLBLD CKD-EPI 2021: 56 ML/MIN/1.73M2
ERYTHROCYTE [DISTWIDTH] IN BLOOD BY AUTOMATED COUNT: 17.4 % (ref 10–15)
GLUCOSE SERPL-MCNC: 87 MG/DL (ref 70–99)
HCO3 SERPL-SCNC: 25 MMOL/L (ref 22–29)
HCT VFR BLD AUTO: 30.4 % (ref 40–53)
HGB BLD-MCNC: 9.7 G/DL (ref 13.3–17.7)
MAGNESIUM SERPL-MCNC: 1.6 MG/DL (ref 1.7–2.3)
MCH RBC QN AUTO: 34.2 PG (ref 26.5–33)
MCHC RBC AUTO-ENTMCNC: 31.9 G/DL (ref 31.5–36.5)
MCV RBC AUTO: 107 FL (ref 78–100)
PLATELET # BLD AUTO: 45 10E3/UL (ref 150–450)
POTASSIUM SERPL-SCNC: 4.2 MMOL/L (ref 3.4–5.3)
PROT SERPL-MCNC: 4.9 G/DL (ref 6.4–8.3)
PSA SERPL DL<=0.01 NG/ML-MCNC: 2.41 NG/ML (ref 0–6.5)
RBC # BLD AUTO: 2.84 10E6/UL (ref 4.4–5.9)
SODIUM SERPL-SCNC: 141 MMOL/L (ref 135–145)
WBC # BLD AUTO: 2.9 10E3/UL (ref 4–11)

## 2024-12-02 PROCEDURE — 82248 BILIRUBIN DIRECT: CPT | Performed by: INTERNAL MEDICINE

## 2024-12-02 PROCEDURE — 120N000009 HC R&B SNF

## 2024-12-02 PROCEDURE — 85014 HEMATOCRIT: CPT | Performed by: INTERNAL MEDICINE

## 2024-12-02 PROCEDURE — 250N000013 HC RX MED GY IP 250 OP 250 PS 637: Performed by: INTERNAL MEDICINE

## 2024-12-02 PROCEDURE — 36415 COLL VENOUS BLD VENIPUNCTURE: CPT | Performed by: INTERNAL MEDICINE

## 2024-12-02 PROCEDURE — 84520 ASSAY OF UREA NITROGEN: CPT | Performed by: INTERNAL MEDICINE

## 2024-12-02 PROCEDURE — 82435 ASSAY OF BLOOD CHLORIDE: CPT | Performed by: INTERNAL MEDICINE

## 2024-12-02 PROCEDURE — 250N000013 HC RX MED GY IP 250 OP 250 PS 637: Performed by: STUDENT IN AN ORGANIZED HEALTH CARE EDUCATION/TRAINING PROGRAM

## 2024-12-02 PROCEDURE — 83735 ASSAY OF MAGNESIUM: CPT | Performed by: INTERNAL MEDICINE

## 2024-12-02 PROCEDURE — 84153 ASSAY OF PSA TOTAL: CPT | Performed by: INTERNAL MEDICINE

## 2024-12-02 PROCEDURE — 250N000012 HC RX MED GY IP 250 OP 636 PS 637: Performed by: STUDENT IN AN ORGANIZED HEALTH CARE EDUCATION/TRAINING PROGRAM

## 2024-12-02 RX ADMIN — SPIRONOLACTONE 50 MG: 50 TABLET ORAL at 08:18

## 2024-12-02 RX ADMIN — ASPIRIN 81 MG CHEWABLE TABLET 81 MG: 81 TABLET CHEWABLE at 08:18

## 2024-12-02 RX ADMIN — PANTOPRAZOLE SODIUM 40 MG: 40 TABLET, DELAYED RELEASE ORAL at 08:18

## 2024-12-02 RX ADMIN — OXYCODONE HYDROCHLORIDE 10 MG: 5 TABLET ORAL at 14:42

## 2024-12-02 RX ADMIN — CIPROFLOXACIN HYDROCHLORIDE 500 MG: 500 TABLET, FILM COATED ORAL at 08:18

## 2024-12-02 RX ADMIN — METFORMIN HYDROCHLORIDE 500 MG: 500 TABLET, FILM COATED ORAL at 08:18

## 2024-12-02 RX ADMIN — PREDNISONE 5 MG: 5 TABLET ORAL at 08:17

## 2024-12-02 RX ADMIN — OXYCODONE HYDROCHLORIDE 5 MG: 5 TABLET ORAL at 02:12

## 2024-12-02 RX ADMIN — ACETAMINOPHEN 325 MG: 325 TABLET, FILM COATED ORAL at 02:12

## 2024-12-02 RX ADMIN — METHOCARBAMOL 500 MG: 500 TABLET ORAL at 18:03

## 2024-12-02 RX ADMIN — ACETAMINOPHEN 650 MG: 325 TABLET, FILM COATED ORAL at 20:44

## 2024-12-02 RX ADMIN — OXYCODONE HYDROCHLORIDE 10 MG: 5 TABLET ORAL at 20:44

## 2024-12-02 RX ADMIN — ACETAMINOPHEN 650 MG: 325 TABLET, FILM COATED ORAL at 14:42

## 2024-12-02 RX ADMIN — Medication 950 MG: at 17:32

## 2024-12-02 ASSESSMENT — ACTIVITIES OF DAILY LIVING (ADL)
ADLS_ACUITY_SCORE: 62
ADLS_ACUITY_SCORE: 61
ADLS_ACUITY_SCORE: 62
ADLS_ACUITY_SCORE: 61
ADLS_ACUITY_SCORE: 62
ADLS_ACUITY_SCORE: 61
ADLS_ACUITY_SCORE: 62
ADLS_ACUITY_SCORE: 61
ADLS_ACUITY_SCORE: 61

## 2024-12-02 NOTE — PLAN OF CARE
"Goal Outcome Evaluation:                    VS: /61 (BP Location: Right arm)   Pulse 77   Temp 98.7  F (37.1  C) (Oral)   Resp 18   Ht 1.803 m (5' 11\")   Wt 118.1 kg (260 lb 5.8 oz)   SpO2 98%   BMI 36.31 kg/m       O2: 98%   Output: Continent of BB, urinal at bedside   Last BM: Yesterday 12/1   Activity: Ax1 w/ walker and GB   Skin: Pt states coccyx wound healed, mepilex for protection CDI   Pain: PRN Oxy and Tylenol x1, available again at 0245.   CMS: Aox4, forgetful   Dressing: Mepilex on coccyx CDI   Diet: Regular, 1500ml fluid restriction   LDA: None   Equipment: Walker, GB, call light   Plan: Continue POC   Additional Info: Pt alert and oriented, able to make needs known and use call light. Metformin held d/t pt request after explaining that GFR was slightly low. Pt in calm mood tonight. Requested PRN melatonin for difficulty falling asleep - passed to overnight nurse. Denies SOB, CP, N/V.              "

## 2024-12-02 NOTE — TELEPHONE ENCOUNTER
Inpatient at TCU [facility]. Called to discuss patient request with nursing staff. I told nurse pt requesting hip xray for hip pain, however, PCP cannot order anything while he is under the care of another provider at the TCU. The TCU provider would have to order if appropriate. Verbalized understanding.       Thanks,  CASANDRA Hess  [facility]

## 2024-12-02 NOTE — PLAN OF CARE
Labs reviewed . Stable . Plt 45 . Creatinine 1.33  Vitasl stable  No nursing concerns  Ordered PSA tumor marker today as per oncology .  Can have next visit with Dr Plunkett on video ( his RN flaquito valentin )   Discuss in team  rounds   Tentative discharge date is Wednesday 12/4

## 2024-12-02 NOTE — CARE PLAN
Pt. A&Ox4. Was gone most of the shift d/t outpatient diabetic shoe fitting appt. Pt edema 3+ BLE, put compression stockings and elevated feet for him. Had pain after appt so gave 10mg oxy and tylenol per pt request. Continue POC.

## 2024-12-02 NOTE — PROGRESS NOTES
"  Physical Therapy Progress Note       Status at Admission - 11/17/2024 Status at Last Update -  Current Status - 12/2/2024   Bed Mobility Rolling IND  Mod assist to manage LLE sit to supine  Supervision with increased time for supine to sit transition  Independent rolling  Supine to/from sit mod I with leg    Transfer CGA to min A to raise  Increased L thigh pain with transition  Stands mod I to 4WW  Pivots with 4WW with supervision and occasional cues for safety and eccentric control   Gait Contact guard with FWW up to 110' with wheelchair follow  Limited by shortness of breath and fatigue  Ambulating up to 110' with 4WW with supervision and verbal cues for safe velocity and to improve mechanics   Stairs Contact guard 3 6\" stairs with B rails, significant reliance on Ues, step to pattern  3 6\" stairs with heavy reliance on BUEs on handrails    Wheelchair Propulsion N/A  Not assessed   Outcomes Measures Not assessed  Not assessed         Assessment of Progress/Current Status: Patient has improved independence with transfers and bed mobility this admission.    Remaining Barriers to Discharge: Swelling and pain at left lateral quadriceps   Justification for Continued Therapy Services: Patient continues to need intermittent cues for safe velocity and body mechanics with ambulation with walker and is limited by L thigh pain with mobility. Without further inpatient PT, pt remains at high risk for falls and readmission.   Equipment/Caregiver Training Needs: 4WW   Other:                  "

## 2024-12-02 NOTE — TELEPHONE ENCOUNTER
Order/Referral Request    Who is requesting: Patient    Orders being requested: X-Ray of hip    Reason service is needed/diagnosis: Patient is in the rehab hospital and has hip pain and would like an order for an x-ray whilst he is in the hospital     When are orders needed by: ASAP    Has this been discussed with Provider: N/A    Does patient have a preference on a Group/Provider/Facility? [facility]    Does patient have an appointment scheduled?: No    Where to send orders: Place orders within Epic    Okay to leave a detailed message?: Yes at Cell number on file:    Telephone Information:   Mobile [phone]

## 2024-12-02 NOTE — TELEPHONE ENCOUNTER
Pt calling to ask if pcp can place XR while still in TCU as he would like to have this completed prior to leaving due to being unable to travel. XR Hip Left 2-3 Views placed 10/14/24 during Virtual Visit with pcp and was discontinued 10/18/24. Pt questions if he can have this completed     FYI only: [facility] to call clinic for new order at a later time. Pt also plans to see pcp in clinic if he is able.     Routing to pcp to advise on imaging request from pt.     Glendy Bonilla RN on 12/2/2024 at 6:00 PM

## 2024-12-02 NOTE — PLAN OF CARE
VS:        Pt A/O X 4. Afebrile. VSS. Denies nausea, shortness of breath, and chest pain.      Output:        Voids spontaneously without difficulty in the bedside urinal or bathroom. Continent of bowel & bladder. LBM 12/2 pet pt report.      Activity:        Pt up to bathroom this shift. Pt is Mod I with walker.      Skin:    Dependent edema, blanchable redness on buttock.      Pain:        Has pain in the L hip, lower back, and abdomen managed with PRN oxycodone & tylenol.      CMS:        Pt reports baseline numbness and tingling in lower extremities.      Dressing:        Buttock Mepilex is C/D/I      Diet:        Pt is on a regular diet with 1500 mL fluid restriction. Takes meds whole with thin liquid.      LDA:        No IV access. On room air.      Equipment:        Pt refused compression socks.      Plan:        Pt is able to make needs known and the call light is within the pt's reach. Continue to monitor pain.       Additional Info:        No acute changes this shift. Diabetic shoe appt today at 1100, self scheduled ride.             Plan of Care Reviewed With: patient    Overall Patient Progress: no change

## 2024-12-02 NOTE — PLAN OF CARE
"Goal Outcome Evaluation:      Plan of Care Reviewed With: patient    Overall Patient Progress: no changeOverall Patient Progress: no change       Pt alert and oriented x4. Able to make needs and concerns known. Uses call light appropriately. Mod I with walker, assistance with BLE when getting out of bed. Continent to both bladder and bowel needs, had BM today. On regular diet, able to take pills whole with thin liquids. On 1500 mL fluid restriction. Pain rated as 7/10 on LLE managed by PRN Oxycodone and Tylenol. Denies chest pain, N/V or headache. SOB noted upon exertion, no desaturation observed. Foot care given. Pt stated, \"I have an appointment tomorrow, my friend will come pick me up. I wish I could do PT in the morning instead of afternoon.\" Calm and cooperative throughout cares. Safety measures in place. Will continue POC.      Patient's most recent vital signs are:     Vital signs:  BP: 117/57  Temp: 97.1  HR: 84  RR: 16  SpO2: 96 %     Patient does not have new respiratory symptoms.  Patient does not have new sore throat.  Patient does not have a fever greater than 99.5.          "

## 2024-12-03 ENCOUNTER — PATIENT OUTREACH (OUTPATIENT)
Dept: ONCOLOGY | Facility: CLINIC | Age: 74
End: 2024-12-03
Payer: COMMERCIAL

## 2024-12-03 ENCOUNTER — APPOINTMENT (OUTPATIENT)
Dept: PHYSICAL THERAPY | Facility: SKILLED NURSING FACILITY | Age: 74
DRG: 314 | End: 2024-12-03
Attending: INTERNAL MEDICINE
Payer: COMMERCIAL

## 2024-12-03 ENCOUNTER — TELEPHONE (OUTPATIENT)
Dept: GASTROENTEROLOGY | Facility: CLINIC | Age: 74
End: 2024-12-03
Payer: COMMERCIAL

## 2024-12-03 PROCEDURE — 120N000009 HC R&B SNF

## 2024-12-03 PROCEDURE — 99316 NF DSCHRG MGMT 30 MIN+: CPT | Performed by: STUDENT IN AN ORGANIZED HEALTH CARE EDUCATION/TRAINING PROGRAM

## 2024-12-03 PROCEDURE — 97110 THERAPEUTIC EXERCISES: CPT | Mod: GP

## 2024-12-03 PROCEDURE — 250N000012 HC RX MED GY IP 250 OP 636 PS 637: Performed by: STUDENT IN AN ORGANIZED HEALTH CARE EDUCATION/TRAINING PROGRAM

## 2024-12-03 PROCEDURE — 97530 THERAPEUTIC ACTIVITIES: CPT | Mod: GP

## 2024-12-03 PROCEDURE — 250N000013 HC RX MED GY IP 250 OP 250 PS 637: Performed by: STUDENT IN AN ORGANIZED HEALTH CARE EDUCATION/TRAINING PROGRAM

## 2024-12-03 PROCEDURE — 250N000013 HC RX MED GY IP 250 OP 250 PS 637: Performed by: INTERNAL MEDICINE

## 2024-12-03 RX ORDER — SPIRONOLACTONE 50 MG/1
50 TABLET, FILM COATED ORAL DAILY
Qty: 30 TABLET | Refills: 0 | Status: SHIPPED | OUTPATIENT
Start: 2024-12-04 | End: 2024-12-06

## 2024-12-03 RX ORDER — CIPROFLOXACIN 500 MG/1
500 TABLET, FILM COATED ORAL EVERY 24 HOURS
Qty: 30 TABLET | Refills: 1 | Status: SHIPPED | OUTPATIENT
Start: 2024-12-04 | End: 2024-12-06

## 2024-12-03 RX ORDER — POLYETHYLENE GLYCOL 3350 17 G/17G
17 POWDER, FOR SOLUTION ORAL DAILY
Qty: 510 G | Refills: 0 | Status: SHIPPED | OUTPATIENT
Start: 2024-12-03 | End: 2024-12-06

## 2024-12-03 RX ORDER — PANTOPRAZOLE SODIUM 40 MG/1
40 TABLET, DELAYED RELEASE ORAL
Qty: 30 TABLET | Refills: 0 | Status: SHIPPED | OUTPATIENT
Start: 2024-12-04 | End: 2024-12-06

## 2024-12-03 RX ADMIN — METFORMIN HYDROCHLORIDE 500 MG: 500 TABLET, FILM COATED ORAL at 17:11

## 2024-12-03 RX ADMIN — ASPIRIN 81 MG CHEWABLE TABLET 81 MG: 81 TABLET CHEWABLE at 08:09

## 2024-12-03 RX ADMIN — METOPROLOL SUCCINATE 200 MG: 200 TABLET, EXTENDED RELEASE ORAL at 08:09

## 2024-12-03 RX ADMIN — OXYCODONE HYDROCHLORIDE 10 MG: 5 TABLET ORAL at 14:28

## 2024-12-03 RX ADMIN — PANTOPRAZOLE SODIUM 40 MG: 40 TABLET, DELAYED RELEASE ORAL at 08:09

## 2024-12-03 RX ADMIN — Medication 950 MG: at 17:11

## 2024-12-03 RX ADMIN — CIPROFLOXACIN HYDROCHLORIDE 500 MG: 500 TABLET, FILM COATED ORAL at 08:09

## 2024-12-03 RX ADMIN — OXYCODONE HYDROCHLORIDE 10 MG: 5 TABLET ORAL at 20:40

## 2024-12-03 RX ADMIN — ACETAMINOPHEN 650 MG: 325 TABLET, FILM COATED ORAL at 20:40

## 2024-12-03 RX ADMIN — ACETAMINOPHEN 650 MG: 325 TABLET, FILM COATED ORAL at 14:28

## 2024-12-03 RX ADMIN — PREDNISONE 5 MG: 5 TABLET ORAL at 08:09

## 2024-12-03 RX ADMIN — SPIRONOLACTONE 50 MG: 50 TABLET ORAL at 08:09

## 2024-12-03 RX ADMIN — METFORMIN HYDROCHLORIDE 500 MG: 500 TABLET, FILM COATED ORAL at 08:09

## 2024-12-03 ASSESSMENT — ACTIVITIES OF DAILY LIVING (ADL)
ADLS_ACUITY_SCORE: 61
ADLS_ACUITY_SCORE: 62
ADLS_ACUITY_SCORE: 61
ADLS_ACUITY_SCORE: 62
ADLS_ACUITY_SCORE: 61
ADLS_ACUITY_SCORE: 62
ADLS_ACUITY_SCORE: 61
ADLS_ACUITY_SCORE: 62
ADLS_ACUITY_SCORE: 62
ADLS_ACUITY_SCORE: 61
ADLS_ACUITY_SCORE: 61
ADLS_ACUITY_SCORE: 62
ADLS_ACUITY_SCORE: 62
ADLS_ACUITY_SCORE: 61
ADLS_ACUITY_SCORE: 62
ADLS_ACUITY_SCORE: 61
ADLS_ACUITY_SCORE: 62

## 2024-12-03 NOTE — TELEPHONE ENCOUNTER
Returned call to patient. Explained TCU team will round and make decisions. Patient can discuss ongoing concerns with TCU team who can page Dr. Renteria if they feel necessary.    PETER WallaceN, RN, PHN  Hepatology Clinic  [facility]  [facility]

## 2024-12-03 NOTE — PROGRESS NOTES
Medicine Cross Cover:    Received call from nursing for patient request for melatonin. Order placed.    MD Eva

## 2024-12-03 NOTE — PROGRESS NOTES
MDS Pain Assessment    The following is the pain interview as conducted by the TCU RN caring for the patient on December 3, 2024. This assessment is required by the [facility] for all patients in [place] SNF (Skilled Nursing Facilities).     . Pain Presence  Have you had pain or hurting at any time in the last 5 days?   1. Yes    . Pain Frequency  How much of the time have you experienced pain or hurting over the last 5 days?   3. Frequently    . Pain Effect on Sleep  Over the past 5 days, how much of the time has pain made it hard for you to sleep at night?   1. Rarely or not at all    . Pain Interference with Therapy Activities  Over the past 5 days, how often have you limited your participation in rehabilitation therapy sessions due to pain?   1. Rarely or not at all    . Pain Interference with Day-to-Day Activities  Over the past 5 days, have you limited your day-to-day activities (excluding rehabilitation therapy sessions) because of pain?  1. Rarely or not at all    . Pain intensity   Numeric Rating Scale (00-10): Please rate your worst pain over the last 5 days on a zero to ten scale, with zero being no pain and ten as the worst pain you can imagine. 10 - patient states sometimes it's an 11

## 2024-12-03 NOTE — PROGRESS NOTES
12/03/24 [id number]   Appointment Info   Signing Clinician's Name / Credentials (PT) HECTOR Brown   PT Assistant Visit Number 1   Student Supervision Line of sight supervision provided   Therapeutic Procedure/Exercise   Ther. Procedure: strength, endurance, ROM, flexibillity Minutes (79330) 10   Treatment Detail/Skilled Intervention PT: Focused on STM/gentle lymph massage to address significant scar tissue and swelling in L quad which inhibits mm activation.   Therapeutic Activity   Therapeutic Activities: dynamic activities to improve functional performance Minutes (48327) 30   Symptoms Noted During/After Treatment Fatigue;Increased pain   Treatment Detail/Skilled Intervention PT: Focused on functional activities (see ggs...). Upon approach, pt sitting up in recliner and agreeable to therapy. Facilitated discussion about d/c date and the potential of OP lymphedema therapy, pt agreeable to plan. On return to room, pt ambulated 111 ft w/ 4WW, CGA and exhibited antalgic gait pattern d/t L hip pain. Cues for safety and mechanics when approaching recliner.   PT Discharge Planning   PT Plan PT: progress stairs (6 or more), STM at L hip, progress gait, safe car transfer, furniture approach w/4WW, cr. order 4WW from [facility] ([place], [facility]) for safe d/c   PT Discharge Recommendation (DC Rec) home with assist;home with home care physical therapy   PT Rationale for DC Rec PT: Pt lives alone in [facility] and a farm house outside [place]. Pt refuses to consider staying at one level living in [place]   PT Brief overview of current status PT: generally CGA for all functional activities. uses FWW and 4WW   PT Equipment Needed at Discharge walker, rolling   Physical Therapy Time and Intention   Timed Code Treatment Minutes 40   Total Session Time (sum of timed and untimed services) 40   Post Acute Settings Only   What unit is patient on? TCU   PT - Transitional Care Unit Time   Individual Time (minutes)  "- PT 40   TCU Total Session Time (minutes) - PT 40   TCU Daily Total Session Time   PT TCU Daily Total Session Time 40   Rehab TCU Daily Total Session Time 40   Walk 50 Feet with Two Turns - Ability to walk at least 50 feet.   Patient Performance Steadying Assist   Describe Performance PT: see TA   4 Steps - Ability to go up/down steps.   Patient Performance Steadying Assist   Describe Performance PT: ascend/descend 6 - 6\" steps, B HR, step to pattern, and relying heavily on BUE for support. Cues for safety and improved mechanics. Pt reports increased pain in L hip.       "

## 2024-12-03 NOTE — PROGRESS NOTES
MITUL met with pt.  Pt wanted to know how to get to appt on Thursday.  MITUL said [facility] will set that up tomorrow. SW wanted to know when pt was going to discharge.  He said Friday after all appts.  He said his friend could come and pick him up at 7 pm.  MITUL said that would be okay.  MITUL will ask for HH.    MITUL sent HH referral to [facility].     NICHOLE Thompson   [facility], Transitional Care Unit   Social Work   [facility] [place] [address]  (PH) [phone]

## 2024-12-03 NOTE — TELEPHONE ENCOUNTER
[facility] Call Center    Phone Message    May a detailed message be left on voicemail: yes     Reason for Call: Medication Question or concern regarding medication   Prescription Clarification  Name of Medication: spironolactone (ALDACTONE) tablet 50 mg   Prescribing Provider: [facility]   Pharmacy: N/A   What on the order needs clarification? The patient called stating he is currently in TCU. The nurses are wondering if they should increase this medication? He is also wondering if he needs to have fluid removed again while he is in TCU? Please contact patient.    Action Taken: Message routed to:  [facility] ([facility]): Hep    Travel Screening: Not Applicable

## 2024-12-03 NOTE — DISCHARGE SUMMARY
"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    Weight as of this encounter: 118.1 kg (260 lb 5.8 oz).  # Moderate Malnutrition: based on nutrition assessment      Follow-ups Needed After Discharge   Follow-up Appointments       Adult [facility] Follow-up and recommended labs and tests      Follow up with primary care provider, Richi Jaramillo, within 7 days to evaluate medication change.  No follow up labs or test are needed.      Appointments on [place] and/or [facility] (with [facility] or [facility] provider or service). Call [phone] if you haven't heard regarding these appointments within 7 days of discharge.                Unresulted Labs Ordered in the Past 30 Days of this Admission       No orders found from 10/17/2024 to 11/17/2024.          Discharge Disposition   Discharged to home  Condition at discharge: Stable    Hospital Course    74-year-old male with history of HTN, metastatic prostate cancer, cirrhosis secondary to chronic HCV infection who presented to hospital (10/29 - 11/16) for a heart failure exacerbation 2/2 hypervolemia. He was started on diuretics.   Patient was also found to have SBP and received antibiotics. He also was found with RENNY and diuretic was held. Transferred to TCU for ongoing rehab needs.     During his hospitalization at the TCU entirely unremarkable.  Met " patient for the first time 12/3.  He was a bit irritated and frustrated with my visit initially .  Patient states he was under the impression he was being discharged Friday.  Patient states he has complaints and he would like to talk to the charge nurse.  Aside from lower extremity edema patient states he has no other complaints. He is sp paracentesis on 12/5 with total og 4L out. Will likely need regular scheduled paracentesis per haps weekly.    He has an RENNY which is improving, we are holding lasix and will need PCP follow and address resuming lasix after renal function back to baseline.  Patient can follow-up with his oncologist via televisit.  Previously planned oncology visit with Dr Plunkett on discharge.     Physical deconditioning:   -Continue working with physical therapy and Occupational Therapy.     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   Ascites.    Longstanding hx of HCM, recently established care w/ [facility] for HF. Has been struggling w/ worsening BLE edema, increased abdominal distension (weeping ascites), from poorly controlled hypervolemia. PTA diuretic regimen w/ Spironolactone was deemed inadequate, admitted for more aggressive titration of diuretic regimen w/ close cardiac monitoring. Started on Bumex drip here by Cards. Repeat ECHO 10/30/24 showing hyperkinetic w/ EF 65-70%, dynamic outflow tract obstruction, peak gradient 119mmHg at rest, grade II moderate diastolic dysfunction - on most recent echo 11/8 this gradient was lower (80) indicative of probable over-estimation on prior.  Transferred to Medicine service 10/31 given a diagnosis of SBP from ascites.  He was aggressively diuresed at the beginning of his hospitalization but subsequent developed acute kidney injury and borderline BP. His metoprolol was reduced early in his admission and resumed at home dose on 11/9.  His shortness of breath improved after restarting his metoprolol.  -His  prior to admission valsartan was held during admission due to low blood pressures and dizziness.    VQ scan from 11/11/24 negative      - Continue metoprolol succinate 200 mg daily  - Continue aspirin 81 mg daily.   - Currently the patient is off diuretics due to RENNY. He had a Cr bump during the weekend, continue monitoring.   - Start Aldactone 50 mg daily.   - Continue holding Valsartan   - Avoid vasodilators  - Ranolazine was stopped d/t no improvement of his symptoms  - Follow-up daily wt. I/o. Frequent bmp. Monitor vitals.   - Monitor lytes: keep K >4, Mg >2. RN protocol prn  - Lymphedema wraps  - Elevate legs.   - He is already being coordinated for myosin inhibitors as an outpatient and he will follow up with Dr. Gonsales on an outpatient basis.      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    Primary Hepatologist: Dr. Renteria   - Continue cipro for sbp ppx  - IR consult for new therapeutic paracentesis.   - Outpatient liver team  - Monitor LFT     RENNY, suspect prerenal  CKD Stage III  Each time diuretic has been ordered he has had a significant increase in his creatinine.   - Holding loop diuretic for now. Continue monitoring.   - follow-up bmp, I.o.   - avoid nephrotoxics.      Anemia of chronic disease, + possible small volume intraperitoneal bleed following paracentesis  BRBPR 11/7  Hx of Melena.   HGB stable.      Metastatic Prostate Cancer  Chronic Pancytopenia   Initially diagnosed earlier this year incidentally following a fall at home, fracturing his L femur. ORIF 05/24/24 and curretage samples c/w adenocarcinoma of prostate origin. Has since followed w/ Oncology as an OP, last saw 10/24/24. PTA on Daralutamide BID, Docetaxel (C4D1 was on 10/24/24) and Leuprolide R0curziu (last 9/5/24). Also gets Neulasta, last 10/25. Has  chronic pancytopenia, likely d/t multiple comorbidities (cirrhosis, CKD) and iatrogenic (chemotherapy). Baseline hgb ~8-9 recently.   - Oncology consulted here 10/31 and recommended to continue Daralutamide.   - continue pta prednisone. 5 mg daily.   - Oncology outpt follow-up.      Type 2 Diabetes Mellitus, non-insulin-dependent, well-controlled  Last A1c 5.8% on 10/29/24.   He is refusing ssi. GI okay with metformin.     Consultations This Hospital Stay   PHYSICAL THERAPY ADULT IP CONSULT  OCCUPATIONAL THERAPY ADULT IP CONSULT  CARE MANAGEMENT / SOCIAL WORK IP CONSULT  INTERVENTIONAL RADIOLOGY ADULT/PEDS IP CONSULT    Code Status   Full Code    Time Spent on this Encounter   I, Immanuel Fraga MD, personally saw the patient today and spent greater than 30 minutes discharging this patient.       Immanuel Fraga MD  [facility] TRANSITIONAL CARE [address] [address]  Phone: [phone]  ______________________________________________________________________    Physical Exam   Vital Signs: Temp: 97.9  F (36.6  C) Temp src: Oral BP: 128/65 Pulse: 92   Resp: 18 SpO2: 97 % O2 Device: None (Room air)    Weight: 260 lbs 5.81 oz  General Appearance: Appears comfortable.  Patient not cooperative with rest of physical exam.         Primary Care Physician   Richi Jaramillo    Discharge Orders      Reason for your hospital stay    weakness     Activity    Your activity upon discharge: activity as tolerated     Adult [facility] Follow-up and recommended labs and tests    Follow up with primary care provider, Richi Jaramillo, within 7 days to evaluate medication change.  No follow up labs or test are needed.      Appointments on [place] and/or [facility] (with [facility] or [facility] provider or service). Call [phone] if you haven't heard regarding these appointments within 7 days of discharge.     Diet    Follow this diet upon discharge: Current Diet:Orders Placed This  Encounter      Fluid restriction 1500 ML FLUID      Snacks/Supplements Adult: Special K Bar; Between Meals      Snacks/Supplements Adult: Other; If pt would like a snack or suppelment, please send; With Meals      Regular Diet Adult       Significant Results and Procedures   Results for orders placed or performed during the hospital encounter of 11/16/24   IR Paracentesis    Narrative    PRE-PROCEDURE DIAGNOSIS: Ascites    POST-PROCEDURE DIAGNOSIS: Same    PROCEDURE: Paracentesis    Impression    IMPRESSION: Completed ultrasound-guided therapeutic paracentesis.  Clear yellow fluid aspirated from the abdomen. No immediate  complication.      ----------    CLINICAL HISTORY: Ultrasound-guided paracentesis requested for  ascites.    PERFORMED BY: Adolfo Mart PA-C    CONSENT: Written informed consent was obtained and is documented in  the patient record.    MEDICATIONS: 1% lidocaine for local anesthesia. Intravenous albumin  was available (see administration record).      DESCRIPTION: Ascites was identified on limited abdominal ultrasound  exam and picture is documented in the patient's record. The abdomen  was prepped and draped in the usual sterile fashion. Local anesthesia  was achieved. Under ultrasound guidance, a 5-Occitan pigtail centesis  needle/catheter was advanced into the peritoneal space and needle was  removed.  The catheter was connected to drainage and ascites was  aspirated. Catheter was removed on completion of drainage and sterile  dressing was applied.    COMPLICATIONS: No immediate concerns, the patient remained stable  throughout the procedure and tolerated it well.    ESTIMATED BLOOD LOSS: Minimal    SPECIMENS: None    ADOLFO MART PA-C         SYSTEM ID:  [id number]     *Note: Due to a large number of results and/or encounters for the requested time period, some results have not been displayed. A complete set of results can be found in Results Review.       Discharge Medications   Current  Discharge Medication List        START taking these medications    Details   ciprofloxacin (CIPRO) 500 MG tablet Take 1 tablet (500 mg) by mouth every 24 hours.  Qty: 30 tablet, Refills: 1    Associated Diagnoses: Cirrhosis of liver with ascites, unspecified hepatic cirrhosis type (H)      pantoprazole (PROTONIX) 40 MG EC tablet Take 1 tablet (40 mg) by mouth every morning (before breakfast).  Qty: 30 tablet, Refills: 0    Associated Diagnoses: Gastroesophageal reflux disease without esophagitis      polyethylene glycol (MIRALAX) 17 GM/Dose powder Take 17 g by mouth daily.  Qty: 510 g, Refills: 0    Associated Diagnoses: Heart failure, unspecified HF chronicity, unspecified heart failure type (H)      spironolactone (ALDACTONE) 50 MG tablet Take 1 tablet (50 mg) by mouth daily.  Qty: 30 tablet, Refills: 0    Associated Diagnoses: Cirrhosis of liver with ascites, unspecified hepatic cirrhosis type (H); Heart failure, unspecified HF chronicity, unspecified heart failure type (H)           CONTINUE these medications which have CHANGED    Details   darolutamide (NUBEQA) 300 MG tablet Take 2 tablets (600 mg) by mouth 2 times daily. . Swallow tablets whole with food.  Qty: 30 tablet, Refills: 0    Associated Diagnoses: Calcium pyrophosphate deposition disease; Prostate cancer (H)           CONTINUE these medications which have NOT CHANGED    Details   acetaminophen (TYLENOL) 325 MG tablet Take 2 tablets (650 mg) by mouth every 4 hours as needed for other (For optimal non-opioid multimodal pain management to improve pain control.)  Qty: 100 tablet, Refills: 0    Associated Diagnoses: Status post hip surgery      aspirin 81 MG EC tablet Take 1 tablet (81 mg) by mouth daily  Qty: 90 tablet, Refills: 3    Associated Diagnoses: Closed intertrochanteric fracture of left femur with routine healing, subsequent encounter      calcium citrate (CITRACAL) 950 (200 Ca) MG tablet Take 1 tablet (950 mg) by mouth daily  Qty: 120 tablet,  Refills: 3    Associated Diagnoses: Prostate cancer (H); Metastasis to bone (H)      diclofenac (VOLTAREN) 1 % topical gel Apply 4 g topically 3 times daily as needed for moderate pain (bursitis)  Qty: 150 g, Refills: 1    Associated Diagnoses: Trochanteric bursitis of left hip      metFORMIN (GLUCOPHAGE) 500 MG tablet Take 1 tablet (500 mg) by mouth 2 times daily (with meals).  Qty: 180 tablet, Refills: 1    Associated Diagnoses: Type 2 diabetes mellitus with diabetic autonomic neuropathy, without long-term current use of insulin (H)      methocarbamol (ROBAXIN) 500 MG tablet Take 1 Tablet (500 mg) by mouth every 6 hours if needed for Muscle Spasm PO 1st choice.      metoprolol succinate ER (TOPROL XL) 100 MG 24 hr tablet 1 1/2 ( 150 mg ) po daily  Qty: 135 tablet, Refills: 1    Comments: Patient to call when refill needed  Associated Diagnoses: Hypertension goal BP (blood pressure) < 140/90      multivitamin w/minerals (THERA-VIT-M) tablet Take 1 tablet by mouth daily      oxyCODONE (ROXICODONE) 5 MG tablet Take 1-2 tablets (5-10 mg) by mouth every 6 hours as needed for pain.  Qty: 70 tablet, Refills: 0    Associated Diagnoses: S/p left hip fracture; Prostate cancer metastatic to bone (H)      predniSONE (DELTASONE) 5 MG tablet Take 1 tablet (5 mg) by mouth daily (with breakfast) Do not take prednisone on days when taking dexamethasone.  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 (H)      vitamin D3 (CHOLECALCIFEROL) 50 mcg (2000 units) tablet Take 1 tablet (50 mcg) by mouth daily  Qty: 120 tablet, Refills: 3    Associated Diagnoses: Prostate cancer (H); Metastasis to bone (H)           STOP taking these medications       bumetanide (BUMEX) 2 MG tablet Comments:   Reason for Stopping:         omeprazole (PRILOSEC) 20 MG DR capsule Comments:   Reason for Stopping:         tiZANidine (ZANAFLEX) 4 MG  tablet Comments:   Reason for Stopping:         traMADol (ULTRAM) 50 MG tablet Comments:   Reason for Stopping:         valsartan (DIOVAN) 160 MG tablet Comments:   Reason for Stopping:             Allergies   Allergies   Allergen Reactions    Bee Venom Swelling     Gum swelling    Haemophilus B Polysaccharide Vaccine Other (See Comments)     PN: delirium  fever    Haemophilus Influenzae Nausea and Other (See Comments)     PN: delirium  fever    Other Reaction(s): Fever    PN: delirium  fever   PN: delirium  fever    Pneumococcal Vaccine      Other Reaction(s): *Unknown, adverse reaction

## 2024-12-03 NOTE — PLAN OF CARE
Goal Outcome Evaluation:    Plan of Care Reviewed With: patient    Overall Patient Progress: no change    Outcome Evaluation: Pt has no c/o pain or discomfort this shift. Per report, pt requested for Melatonin for sleep. Once order was received, pt was noted to be sleeping/resting well x 3 checks. No sleep aid can be given after 3 am. Pt has no c/o SOB and no s/s of respiratory issue noted at RA. Appear to be sleeping/resting between cares/ meds. Continue with plan of care.    Patient's most recent vital signs are:     Vital signs:  BP: 137/61  Temp: 98.7  HR: 77  RR: 18  SpO2: 98 %     Patient does not have new respiratory symptoms.  Patient does not have new sore throat.  Patient does not have a fever greater than 99.5.

## 2024-12-03 NOTE — PROGRESS NOTES
[facility]: Cancer Care                                                                                          Patient called requesting transportation for 12/5 appts.  Patient still in TCU.  Advised patient to contact MITUL Allen in the TCU for transportation as he did before.  Patient verbalized understanding.    Piedad SEBASTIAN RN  Cancer Care Coordinator  [facility]

## 2024-12-03 NOTE — PLAN OF CARE
Goal Outcome Evaluation:      Plan of Care Reviewed With: patient    Overall Patient Progress: improving  Illness Severity: Stable  Orientation: Alert and oriented x4. Able to make needs known to staff.   Bowel & Bladder: Continent of both bowel and bladder. Voids spontaneously without difficulty.  Medications: Takes medication whole with thin liquids.  Pain: Pain managed with PRN acetaminophen and oxycodone x1 which was effective.  Ambulation/Transfers: independently w/ walker.  Diet: Regular diet w/ a fluid restriction of 1500mL with good  appetite.  Lines: None     Oxygen: Room air  Skin: No issues  Infection/Isolation: No active isolations.  Safety: No concerns noted this shift.   Other: Denied chest pain, N&V, and SOB. Call-light within reach. Utilizes call-light appropriately. Continue with POC.

## 2024-12-04 ENCOUNTER — APPOINTMENT (OUTPATIENT)
Dept: PHYSICAL THERAPY | Facility: SKILLED NURSING FACILITY | Age: 74
DRG: 314 | End: 2024-12-04
Attending: INTERNAL MEDICINE
Payer: COMMERCIAL

## 2024-12-04 PROCEDURE — 250N000013 HC RX MED GY IP 250 OP 250 PS 637: Performed by: INTERNAL MEDICINE

## 2024-12-04 PROCEDURE — 97530 THERAPEUTIC ACTIVITIES: CPT | Mod: GP

## 2024-12-04 PROCEDURE — 120N000009 HC R&B SNF

## 2024-12-04 PROCEDURE — 250N000013 HC RX MED GY IP 250 OP 250 PS 637: Performed by: STUDENT IN AN ORGANIZED HEALTH CARE EDUCATION/TRAINING PROGRAM

## 2024-12-04 PROCEDURE — 250N000012 HC RX MED GY IP 250 OP 636 PS 637: Performed by: STUDENT IN AN ORGANIZED HEALTH CARE EDUCATION/TRAINING PROGRAM

## 2024-12-04 RX ADMIN — ACETAMINOPHEN 650 MG: 325 TABLET, FILM COATED ORAL at 16:17

## 2024-12-04 RX ADMIN — OXYCODONE HYDROCHLORIDE 10 MG: 5 TABLET ORAL at 19:09

## 2024-12-04 RX ADMIN — PREDNISONE 5 MG: 5 TABLET ORAL at 08:00

## 2024-12-04 RX ADMIN — OXYCODONE HYDROCHLORIDE 10 MG: 5 TABLET ORAL at 12:50

## 2024-12-04 RX ADMIN — METOPROLOL SUCCINATE 200 MG: 200 TABLET, EXTENDED RELEASE ORAL at 08:00

## 2024-12-04 RX ADMIN — ASPIRIN 81 MG CHEWABLE TABLET 81 MG: 81 TABLET CHEWABLE at 08:00

## 2024-12-04 RX ADMIN — CIPROFLOXACIN HYDROCHLORIDE 500 MG: 500 TABLET, FILM COATED ORAL at 08:00

## 2024-12-04 RX ADMIN — SPIRONOLACTONE 50 MG: 50 TABLET ORAL at 08:00

## 2024-12-04 RX ADMIN — METFORMIN HYDROCHLORIDE 500 MG: 500 TABLET, FILM COATED ORAL at 19:09

## 2024-12-04 RX ADMIN — Medication 950 MG: at 16:17

## 2024-12-04 RX ADMIN — OXYCODONE HYDROCHLORIDE 10 MG: 5 TABLET ORAL at 03:03

## 2024-12-04 RX ADMIN — METFORMIN HYDROCHLORIDE 500 MG: 500 TABLET, FILM COATED ORAL at 08:00

## 2024-12-04 RX ADMIN — PANTOPRAZOLE SODIUM 40 MG: 40 TABLET, DELAYED RELEASE ORAL at 08:00

## 2024-12-04 RX ADMIN — ACETAMINOPHEN 650 MG: 325 TABLET, FILM COATED ORAL at 03:02

## 2024-12-04 ASSESSMENT — ACTIVITIES OF DAILY LIVING (ADL)
ADLS_ACUITY_SCORE: 62

## 2024-12-04 NOTE — TELEPHONE ENCOUNTER
I did put in a future order for hip xray.  This could be done at a [facility].  Typically any tests done while in TCU would be per the attending provider there.     Please inform patient.    Richi Jaramillo MD

## 2024-12-04 NOTE — PROGRESS NOTES
CLINICAL NUTRITION SERVICES - REASSESSMENT NOTE     Nutrition Prescription    RECOMMENDATIONS FOR MDs/PROVIDERS TO ORDER:  Encourage intakes    Malnutrition Status:    Moderate malnutrition in the context of acute illness    Recommendations already ordered by Registered Dietitian (RD):  Encouraged intakes  Changed supplement from special K bar to Ensure Clear    Future/Additional Recommendations:  Monitor labs, intakes, and weight trends.     EVALUATION OF THE PROGRESS TOWARD GOALS   Diet: 1500 mL Fluid Restriction and Regular  Intake/[place]: 0 - 75% of documented meals. Poorly documented over last week  Snacks/supplements: Special K bar BID+ PRN    Pt ordering (on average) 1770 kcal and 63 g protein per day per [employer]. Supplements add 360 kcal and 24 g protein daily. With documented and reported intakes, pt is likely meeting at least 50-60% minimum energy and protein needs.     NEW FINDINGS/REVIEW OF SYSTEMS    Nutrition/GI: RD met with pt at bedside. Pt reports eating some food from outside in addition to food from kitchen. Pt states he eats 1-3 special K bars daily but is wanting to switch to Ensure Clear. Pt reports taste changes with chemo and nausea affecting appetite and intakes. Pt also reports he has had two items from room service that have made him immediately want to throw up and he had communicated this with the kitchen. Reviewed why pt is only able to get small portions of soup. Encouraged continued use of outside foods.     Weights: Pt with 23 lb (10%) weight gain over 1 month, 7 lb (3%) weight gain in 6 months. Pt with 12 lb (5%) weight gain in 2 weeks during admission to TCU.   12/02/24 118.1 kg (260 lb 5.8 oz)   11/16/24 112.9 kg (248 lb 12.8 oz)    11/25/24 118.4 kg (261 lb 1.6 oz)   11/22/24 116.4 kg (256 lb 9.6 oz)   11/16/24 112.9 kg (248 lb 12.8 oz)   11/15/24 112.1 kg (247 lb 1.6 oz)   11/02/24 107.7 kg (237 lb 6.4 oz)    10/24/24 123.4 kg (272 lb 1.6 oz)   10/24/24 104 kg (229 lb 4.5  oz)   10/03/24 115.1 kg (253 lb 12.8 oz)   09/05/24 104.8 kg (231 lb 0.7 oz)   08/27/24 104.8 kg (231 lb)   08/19/24 104.8 kg (231 lb)   08/14/24 106.5 kg (234 lb 11.2 oz)   07/18/24 104.8 kg (231 lb 0.7 oz)   07/08/24 104.8 kg (231 lb)   06/26/24 107.1 kg (236 lb 3.2 oz)   05/27/24 114.9 kg (253 lb 4.8 oz)   05/17/24 112 kg (247 lb)   04/30/24 115 kg (253 lb 8 oz)   04/16/24 117.6 kg (259 lb 4.8 oz)   03/05/24 116.6 kg (257 lb)     Skin: Mich 19, coccyx wound     Labs reviewed   11/18/24 06:41 11/21/24 06:56 11/23/24 10:24 11/24/24 05:37 11/25/24 10:58 11/28/24 07:24 12/02/24 07:06   Sodium 144 140 145 142 143 143 141   Potassium 4.5 4.0 4.1 3.8 4.0 4.2 4.2   Urea Nitrogen 43.8 (H) 40.8 (H) 40.6 (H) 41.9 (H) 37.0 (H) 34.0 (H) 31.9 (H)   Creatinine 1.49 (H) 1.31 (H) 1.38 (H) 1.51 (H) 1.42 (H) 1.24 (H) 1.33 (H)   Magnesium 2.1    2.4 (H)  1.6 (L)   Alkaline Phosphatase 221 (H)  168 (H)  178 (H)  139   ALT 31  24  28  23   AST 43  39  41  35   Glucose 126 (H) 98 142 (H) 105 (H) 116 (H) 89 87      Medications reviewed: Citracal, Cipro, metformin, protonix, prednisone, spironolactone, oxycodone  IV Fluids over last 7 days:  No    MALNUTRITION  % Intake: < 75% for  >7 days (moderate)  % Weight Loss: None noted  Subcutaneous Fat Loss: Upper Arm mild  Muscle Loss: Upper arm mild  Fluid Accumulation/Edema: present, unsure of severity  Malnutrition Diagnosis: Moderate malnutrition in the context of acute illness    Previous Goals   Patient to consume % of nutritionally adequate meal trays TID, or the equivalent with supplements/snacks.  Evaluation: Not met     Previous Nutrition Diagnosis  Inadequate oral intake related to decreased appetite as evidenced by documented intakes, evident muscle/fat wasting.   Evaluation: No change    CURRENT NUTRITION DIAGNOSIS  Inadequate oral intake related to taste changes, nausea, decreased appetite as evidenced by pt report, documented intakes, evident muscle/fat wasting      INTERVENTIONS  Implementation  Nutrition education for nutrition relationship to health/disease   Modify composition of meals/snacks     Goals  Patient to consume % of nutritionally adequate meal trays TID, or the equivalent with supplements/snacks.    Monitoring/Evaluation  Progress toward goals will be monitored and evaluated per protocol.    Marta Wheatley MS, RDN, LD  TCU/OB/Ortho Clinical Dietitian  Available via phone and Vocera  Phone: [phone]  Vocera: TCU Clinical Dietitian   Weekend/Holiday Vocera: Weekend Holiday Clinical Dietitian [Multi Site Groups]

## 2024-12-04 NOTE — PLAN OF CARE
Shift: [id number]    Goal Outcome Evaluation:    Plan of Care Reviewed With: patient    Overall Patient Progress: no change    A/O x4  MOD I w/ walker  Regular diet  1500 mL fluid restriction  Takes medications whole  Pt slept most of shift with no complaints of pain  Call light within reach, will continue to monitor and follow POC

## 2024-12-04 NOTE — PLAN OF CARE
Vitally stable.  Nursing documentation reviewed.   Labs from 12/2 reviewed/ Plt ct 45k, Hb 9.7, Cr. 1.33 12/2 (up from 1.24 on 11/28)  Continue to trend BMP. Continue to hold diuretics (only on spironolactone ).  Planned for discharger tentatively 12/6.   - xray of hip ordered  - Consult to IR for paracentesis   - viral upper resp panel   =======================================  Addendum:  Patient seen and examined at bedside in no distress. Patient states he has several topics to address today. He states his left hip is bothersome and would like an Xray. He also endorses upper resp symptoms. He denies headache or lightheadedness which he states previously bothered him.

## 2024-12-04 NOTE — PROGRESS NOTES
12/04/24 1440   Appointment Info   Signing Clinician's Name / Credentials (PT) HECTOR Brown   PT Assistant Visit Number 2   Student Supervision Line of sight supervision provided   Therapeutic Activity   Therapeutic Activities: dynamic activities to improve functional performance Minutes (90571) 60   Symptoms Noted During/After Treatment Increased pain;Fatigue   Treatment Detail/Skilled Intervention PT: Focused on functional activities and functional activity endurance (see ggs...) Nustep bike for 8 minutes at L7 (256 steps, 29 avg spm, 1.9 METs, .1 mi). Upon return to room, pt complained of L leg pain and requested to return to bed, th performed 5 min STM/gentle lymph massage to address significant scar tissue and swelling in L quad which inhibits mm activation. Pt exhibits areas of 3+ edema in distal LEs and 2+ edema of proximal LEs. Facilitated discussion about confirmation of lymphedema treatment orders following d/c w/ [facility] in [facility]. Pt performed ambu bout from therapy gym < room,~111 ft, exhibiting antalgic gait pattern throughout bout. Required heavy v/c for safe furniture approach;  engaging breaks before sitting and close proximity of 4WW to chair/bed.   PT Discharge Planning   PT Plan PT: IND activities, confirm DME ([employer] 4ww), verify lymphedema orders   PT Discharge Recommendation (DC Rec) home with assist;home with home care physical therapy   PT Rationale for DC Rec PT: Pt lives alone in [facility] and a farm house outside [place]. Pt refuses to consider staying at one level living in [place]   PT Brief overview of current status PT: generally CGA for all functional activities. uses FWW and 4WW   PT Equipment Needed at Discharge walker, rolling   Physical Therapy Time and Intention   Timed Code Treatment Minutes 60   Total Session Time (sum of timed and untimed services) 60   Post Acute Settings Only   What unit is patient on? TCU   PT - Transitional Care Unit Time  "  Individual Time (minutes) - PT 60   TCU Total Session Time (minutes) - PT 60   TCU Daily Total Session Time   PT TCU Daily Total Session Time 60   Rehab TCU Daily Total Session Time 60   Bed Mobility: Sit to lying   Patient Performance Independent   Staff Performance Set up help only   Describe Performance PT: Mod-I w/ bed rail; pt refused to scoot towards HOB while sitting on EOB, then once supine attempted to pull self up using rails at HOB w/ max effort.   4 Steps - Ability to go up/down steps.   Patient Performance Steadying Assist   Describe Performance PT: ascend/descend 4 - 6\" steps, B HR, step to pattern, and relying heavily on BUE for support. Pt reports increased pain in L hip. Required mod v/c for reduced reliance on BUE and pushing up from chair vs pulling on HR.   Car Transfer - Ability to transfer in/out of car or van.   Patient Performance Partial/moderate assist \"includes weight bearing assist of trunk or limbs\"   Describe Performance PT: Mod-A for lifting of LLE in/out of car. Pt requiring v/c to use RUE to push-off from w/c armrest vs pulling themselves up using car door.       "

## 2024-12-04 NOTE — PROGRESS NOTES
Interdisciplinary team including providers, therapy, nursing, nutrition, social work/care coordinator rounded prior to meeting with patient          Care Conference (Team meeting with patient):    Discharge environment/plan: return to [facility] in [place].      Barriers to discharge: Pt has medical issues at this time.  Plus medical appts.       Time Frame for discharge: Currently, set for Friday evening.        Equipment/Caregiver Training Needed: None.       Transportation Plan: friend Aba.     TCU Notice of Medicare Non-Coverage Note:     Met with patient to Introduce self and role.     Discussed Notice of Medicare Non-Coverage and last day of coverage as required for all patients with Medicare coverage during Skilled Nursing Facility (SNF) stays.Informed patient/family that Medicare covers stay until midnight of day before discharge. TCU does not bill for discharge date.    Last coverage day is 12/6/24. Discharge date expected 12/6/24.    Opportunity provided for questions and education provided regarding potential financial impact to patient.     Patient verbalize(s) understanding of discussion.     When SW came into room.  Pt was incredibly distended.  He said he felt like throwing up. Nothing is going down his throat. SW went and got him Ginger Ale.   His legs were very swollen and tight from yesterday.      We talked about how to rescind or appeal if pt was not feeling well after tomorrow.     NICHOLE Thompson   [facility], Transitional Care Unit   Social Work   [facility] [address] [place] [address]  (PH) [phone]

## 2024-12-04 NOTE — PLAN OF CARE
Goal Outcome Evaluation:      Plan of Care Reviewed With: patient    Overall Patient Progress: no changeOverall Patient Progress: no change    FOCUS/GOAL     Bowel management, Bladder management, Medication management, Wound care management, Medical management, Mobility, Skin integrity, and Safety management     ASSESSMENT, INTERVENTIONS AND CONTINUING PLAN FOR GOAL:     Pt is A&OX4, calm, & cooperative with care. Denied CP, SOB, & n/v. VSS & on RA. Pt is MOD I with walker but requests assist to get legs up on the bed. Continent for both B&B. On a regular diet with a FR of 1500 mL. Takes meds whole with thin liquid. Managed L hip, lower back, & abd pain with PRN oxycodone and tylenol. Dax. tubiGrip in place. Pt ate dinner with good appetite & no other acute concern. Able to make needs known & call light within reach. Continue with plan of care.    Patient's most recent vital signs are:     Vital signs:  BP: 131/52  Temp: 97.7  HR: 69  RR: 18  SpO2: 97 %     Patient does not have new respiratory symptoms.  Patient does not have new sore throat.  Patient does not have a fever greater than 99.5.

## 2024-12-04 NOTE — PLAN OF CARE
Goal Outcome Evaluation:      Plan of Care Reviewed With: patient    Overall Patient Progress: no change;Overall Patient Progress: no change    Patient is alert and oriented x4. Able to make needs known. Pt denied SOB, fever, chills, and chest pain, but verbalized intermittent nausea. Pt requested for Ginger Ale with notable decrease nausea after 15 minutes of drink. Pt requested for PRN Oxycodone/Tylenol for left hip and generalized body pain of 7 out of 10. Notable round and distended abdomen with expected paracentesis appointment tomorrow. Pt is continent of bowel and bladder. UMA in room with walker, but patient requires assistance during intense hip pain. Call light within reach.      Patient's most recent vital signs are:     Vital signs:  BP: 123/55  Temp: 98  HR: 71  RR: 16  SpO2: 97 %     Patient does not have new respiratory symptoms.  Patient does not have new sore throat.  Patient does not have a fever greater than 99.5.      Problem: Oncology Care  Goal: Effective Coping  Description: Provide opportunity for expression of feelings, perceptions and stressors.    Assist with accurate evaluation of situation; encourage focus on the present.    Discuss previous challenging situations that were mastered to enhance self-esteem and self-efficacy.    Identify and utilize strengths; acknowledge progress and accomplishments.    Identify positive coping strategies (e.g., music, spirituality, optimism, reframing, problem-solving).    Support stress-reduction (e.g., self-care, calming techniques, pet therapy).    Outcome: Progressing  Intervention: Support and Enhance Coping Strategies  Recent Flowsheet Documentation  Taken 12/4/2024 1250 by Ras Shelby RN  Supportive Measures: active listening utilized  Environmental Support: calm environment promoted     Problem: Oncology Care  Goal: Effective Coping  Description: Provide opportunity for expression of feelings, perceptions and stressors.    Assist with  accurate evaluation of situation; encourage focus on the present.    Discuss previous challenging situations that were mastered to enhance self-esteem and self-efficacy.    Identify and utilize strengths; acknowledge progress and accomplishments.    Identify positive coping strategies (e.g., music, spirituality, optimism, reframing, problem-solving).    Support stress-reduction (e.g., self-care, calming techniques, pet therapy).    Outcome: Progressing     Problem: Comorbidity Management  Goal: Blood Glucose Levels Within Targeted Range  Description: Establish target blood glucose levels based on patient-specific factors, such as illness severity and comorbidity.    Document blood glucose levels and monitor trend; advocate for treatment if not within targeted range.    Provide pharmacologic therapy to maintain blood glucose levels within targeted range.    Advocate for insulin therapy if blood glucose level remains elevated.    Check blood glucose level if there is a change in mental or cognitive status.    Outcome: Progressing  Intervention: Monitor and Manage Glycemia  Recent Flowsheet Documentation  Taken 12/4/2024 1250 by Ras Shelby RN  Medication Review/Management: medications reviewed     Problem: Comorbidity Management  Goal: Maintenance of Heart Failure Symptom Control  Description: Evaluate adherence to home heart failure self-care regimen (e.g., medication, fluid balance, sodium intake, daily weight, physical activity, telemonitoring, support).    Advocate continuation of home medication and schedule.    Consider pharmacologic therapy administration time and effects (e.g., avoid giving diuretic prior to bedtime or nitrates on empty stomach).  Monitor response to pharmacologic therapy, including weight fluctuations, blood pressure and electrolyte levels.    Monitor for signs and symptoms of anxiety and depression, including severity and duration; if present, provide psychosocial  support.    Consider need for heart failure clinic or palliative care consult.      Outcome: Progressing  Intervention: Maintain Heart Failure Management  Recent Flowsheet Documentation  Taken 12/4/2024 1250 by Ras Shelby RN  Medication Review/Management: medications reviewed     Problem: Chemotherapy Effects  Goal: Nausea and Vomiting Relief  Description: Adjust position to prevent aspiration.    Identify and address underlying cause.    Monitor intake, output and laboratory value trends; advocate for adjustment in treatment with imbalance.    Evaluate medication (addition, withdrawal, toxicity) as potential source or trigger, such as an opioid agent.    Administer antiemetic agent per prescribed regimen.    Assess fluid status and ability to take oral fluids; if unable to provide or achieve oral intake, provide intravenous fluid therapy and electrolyte replacement.    Minimize sight, smell and taste of foods or odors that trigger nausea.    Outcome: Progressing  Intervention: Prevent or Manage Nausea and Vomiting  Recent Flowsheet Documentation  Taken 12/4/2024 1250 by Ras Shelby RN  Nausea/Vomiting Interventions: (patient requested Ginger Ale  drink) other (see comments)  Environmental Support: calm environment promoted     Problem: Chemotherapy Effects  Goal: Nausea and Vomiting Relief  Description: Adjust position to prevent aspiration.    Identify and address underlying cause.    Monitor intake, output and laboratory value trends; advocate for adjustment in treatment with imbalance.    Evaluate medication (addition, withdrawal, toxicity) as potential source or trigger, such as an opioid agent.    Administer antiemetic agent per prescribed regimen.    Assess fluid status and ability to take oral fluids; if unable to provide or achieve oral intake, provide intravenous fluid therapy and electrolyte replacement.    Minimize sight, smell and taste of foods or odors that trigger nausea.    Outcome:  Progressing     Problem: Wound  Goal: Optimal Coping  Outcome: Progressing  Intervention: Support Patient and Family Response  Recent Flowsheet Documentation  Taken 12/4/2024 1250 by Ras Shelby RN  Supportive Measures: active listening utilized     Problem: Skin Injury Risk Increased  Goal: Skin Health and Integrity  Outcome: Progressing  Intervention: Plan: Nurse Driven Intervention: Moisture Management  Recent Flowsheet Documentation  Taken 12/4/2024 1250 by Ras Shelby RN  Moisture Interventions: Encourage regular toileting  Intervention: Optimize Skin Protection  Recent Flowsheet Documentation  Taken 12/4/2024 1250 by Ras Shelby RN  Activity Management: activity adjusted per tolerance  Head of Bed (HOB) Positioning: HOB at 20-30 degrees     Problem: Pain Chronic (Persistent)  Goal: Optimal Pain Control and Function  Outcome: Progressing  Intervention: Optimize Psychosocial Wellbeing  Recent Flowsheet Documentation  Taken 12/4/2024 1250 by Ras Shelby RN  Supportive Measures: active listening utilized  Intervention: Develop Pain Management Plan  Recent Flowsheet Documentation  Taken 12/4/2024 1617 by Ras Shelby RN  Pain Management Interventions: medication (see MAR)  Taken 12/4/2024 1250 by Ras Shelby RN  Pain Management Interventions: medication (see MAR)     Problem: Pain Chronic (Persistent)  Goal: Optimal Pain Control and Function  Outcome: Progressing

## 2024-12-05 ENCOUNTER — MEDICAL CORRESPONDENCE (OUTPATIENT)
Dept: HEALTH INFORMATION MANAGEMENT | Facility: CLINIC | Age: 74
End: 2024-12-05

## 2024-12-05 ENCOUNTER — APPOINTMENT (OUTPATIENT)
Dept: PHYSICAL THERAPY | Facility: SKILLED NURSING FACILITY | Age: 74
DRG: 314 | End: 2024-12-05
Attending: INTERNAL MEDICINE
Payer: COMMERCIAL

## 2024-12-05 ENCOUNTER — ONCOLOGY VISIT (OUTPATIENT)
Dept: ONCOLOGY | Facility: CLINIC | Age: 74
End: 2024-12-05
Payer: COMMERCIAL

## 2024-12-05 ENCOUNTER — APPOINTMENT (OUTPATIENT)
Dept: INTERVENTIONAL RADIOLOGY/VASCULAR | Facility: CLINIC | Age: 74
End: 2024-12-05
Attending: PHYSICIAN ASSISTANT
Payer: COMMERCIAL

## 2024-12-05 ENCOUNTER — APPOINTMENT (OUTPATIENT)
Dept: LAB | Facility: CLINIC | Age: 74
End: 2024-12-05
Payer: COMMERCIAL

## 2024-12-05 VITALS
BODY MASS INDEX: 37.36 KG/M2 | SYSTOLIC BLOOD PRESSURE: 102 MMHG | TEMPERATURE: 98.1 F | DIASTOLIC BLOOD PRESSURE: 65 MMHG | HEART RATE: 61 BPM | HEIGHT: 70 IN | RESPIRATION RATE: 18 BRPM | OXYGEN SATURATION: 99 %

## 2024-12-05 DIAGNOSIS — I50.32 CHRONIC DIASTOLIC HEART FAILURE (H): ICD-10-CM

## 2024-12-05 DIAGNOSIS — I42.1 HOCM (HYPERTROPHIC OBSTRUCTIVE CARDIOMYOPATHY) (H): ICD-10-CM

## 2024-12-05 DIAGNOSIS — C61 PROSTATE CANCER (H): Primary | ICD-10-CM

## 2024-12-05 DIAGNOSIS — C79.51 METASTASIS TO BONE (H): ICD-10-CM

## 2024-12-05 DIAGNOSIS — C77.8 MALIGNANT NEOPLASM METASTATIC TO LYMPH NODES OF MULTIPLE SITES (H): ICD-10-CM

## 2024-12-05 DIAGNOSIS — Z51.11 ENCOUNTER FOR ANTINEOPLASTIC CHEMOTHERAPY: ICD-10-CM

## 2024-12-05 LAB
ABSOLUTE NEUTROPHILS, BODY FLUID: 21.3 /UL
ALBUMIN BODY FLUID SOURCE: NORMAL
ALBUMIN FLD-MCNC: 0.5 G/DL
ALBUMIN SERPL BCG-MCNC: 2.7 G/DL (ref 3.5–5.2)
ALP SERPL-CCNC: 134 U/L (ref 40–150)
ALT SERPL W P-5'-P-CCNC: 21 U/L (ref 0–70)
ANION GAP SERPL CALCULATED.3IONS-SCNC: 6 MMOL/L (ref 7–15)
ANION GAP SERPL CALCULATED.3IONS-SCNC: 8 MMOL/L (ref 7–15)
APPEARANCE FLD: CLEAR
AST SERPL W P-5'-P-CCNC: 35 U/L (ref 0–45)
BASOPHILS # BLD AUTO: 0 10E3/UL (ref 0–0.2)
BASOPHILS NFR BLD AUTO: 0 %
BILIRUB SERPL-MCNC: 1 MG/DL
BUN SERPL-MCNC: 31.8 MG/DL (ref 8–23)
BUN SERPL-MCNC: 33.2 MG/DL (ref 8–23)
CALCIUM SERPL-MCNC: 8.3 MG/DL (ref 8.8–10.4)
CALCIUM SERPL-MCNC: 8.5 MG/DL (ref 8.8–10.4)
CELL COUNT BODY FLUID SOURCE: NORMAL
CHLORIDE SERPL-SCNC: 111 MMOL/L (ref 98–107)
CHLORIDE SERPL-SCNC: 114 MMOL/L (ref 98–107)
COLOR FLD: YELLOW
CREAT SERPL-MCNC: 1.31 MG/DL (ref 0.67–1.17)
CREAT SERPL-MCNC: 1.33 MG/DL (ref 0.67–1.17)
EGFRCR SERPLBLD CKD-EPI 2021: 56 ML/MIN/1.73M2
EGFRCR SERPLBLD CKD-EPI 2021: 57 ML/MIN/1.73M2
EOSINOPHIL # BLD AUTO: 0.2 10E3/UL (ref 0–0.7)
EOSINOPHIL NFR BLD AUTO: 6 %
ERYTHROCYTE [DISTWIDTH] IN BLOOD BY AUTOMATED COUNT: 17.1 % (ref 10–15)
ERYTHROCYTE [DISTWIDTH] IN BLOOD BY AUTOMATED COUNT: 17.2 % (ref 10–15)
GLUCOSE SERPL-MCNC: 86 MG/DL (ref 70–99)
GLUCOSE SERPL-MCNC: 93 MG/DL (ref 70–99)
GRAM STAIN RESULT: NORMAL
GRAM STAIN RESULT: NORMAL
HCO3 SERPL-SCNC: 23 MMOL/L (ref 22–29)
HCO3 SERPL-SCNC: 24 MMOL/L (ref 22–29)
HCT VFR BLD AUTO: 30.3 % (ref 40–53)
HCT VFR BLD AUTO: 30.9 % (ref 40–53)
HGB BLD-MCNC: 9.3 G/DL (ref 13.3–17.7)
HGB BLD-MCNC: 9.7 G/DL (ref 13.3–17.7)
IMM GRANULOCYTES # BLD: 0 10E3/UL
IMM GRANULOCYTES NFR BLD: 0 %
LYMPHOCYTES # BLD AUTO: 0.6 10E3/UL (ref 0.8–5.3)
LYMPHOCYTES NFR BLD AUTO: 20 %
LYMPHOCYTES NFR FLD MANUAL: 20 %
MCH RBC QN AUTO: 33.2 PG (ref 26.5–33)
MCH RBC QN AUTO: 33.6 PG (ref 26.5–33)
MCHC RBC AUTO-ENTMCNC: 30.7 G/DL (ref 31.5–36.5)
MCHC RBC AUTO-ENTMCNC: 31.4 G/DL (ref 31.5–36.5)
MCV RBC AUTO: 107 FL (ref 78–100)
MCV RBC AUTO: 108 FL (ref 78–100)
MONOCYTES # BLD AUTO: 0.2 10E3/UL (ref 0–1.3)
MONOCYTES NFR BLD AUTO: 8 %
MONOS+MACROS NFR FLD MANUAL: 48 %
NEUTROPHILS # BLD AUTO: 1.9 10E3/UL (ref 1.6–8.3)
NEUTROPHILS NFR BLD AUTO: 66 %
NEUTS BAND NFR FLD MANUAL: 27 %
NRBC # BLD AUTO: 0 10E3/UL
NRBC BLD AUTO-RTO: 0 /100
OTHER CELLS FLD MANUAL: 5 %
PLATELET # BLD AUTO: 39 10E3/UL (ref 150–450)
PLATELET # BLD AUTO: 39 10E3/UL (ref 150–450)
POTASSIUM SERPL-SCNC: 4.4 MMOL/L (ref 3.4–5.3)
POTASSIUM SERPL-SCNC: 4.5 MMOL/L (ref 3.4–5.3)
PROT FLD-MCNC: 0.7 G/DL
PROT SERPL-MCNC: 4.9 G/DL (ref 6.4–8.3)
PROTEIN BODY FLUID SOURCE: NORMAL
PSA SERPL DL<=0.01 NG/ML-MCNC: 2.44 NG/ML (ref 0–6.5)
RBC # BLD AUTO: 2.8 10E6/UL (ref 4.4–5.9)
RBC # BLD AUTO: 2.89 10E6/UL (ref 4.4–5.9)
SODIUM SERPL-SCNC: 141 MMOL/L (ref 135–145)
SODIUM SERPL-SCNC: 145 MMOL/L (ref 135–145)
WBC # BLD AUTO: 2.7 10E3/UL (ref 4–11)
WBC # BLD AUTO: 2.8 10E3/UL (ref 4–11)
WBC # FLD AUTO: 79 /UL

## 2024-12-05 PROCEDURE — 250N000013 HC RX MED GY IP 250 OP 250 PS 637: Performed by: INTERNAL MEDICINE

## 2024-12-05 PROCEDURE — 89051 BODY FLUID CELL COUNT: CPT | Performed by: STUDENT IN AN ORGANIZED HEALTH CARE EDUCATION/TRAINING PROGRAM

## 2024-12-05 PROCEDURE — 97530 THERAPEUTIC ACTIVITIES: CPT | Mod: GP

## 2024-12-05 PROCEDURE — 250N000009 HC RX 250: Performed by: PHYSICIAN ASSISTANT

## 2024-12-05 PROCEDURE — 49083 ABD PARACENTESIS W/IMAGING: CPT

## 2024-12-05 PROCEDURE — 250N000012 HC RX MED GY IP 250 OP 636 PS 637: Performed by: STUDENT IN AN ORGANIZED HEALTH CARE EDUCATION/TRAINING PROGRAM

## 2024-12-05 PROCEDURE — 82042 OTHER SOURCE ALBUMIN QUAN EA: CPT | Performed by: STUDENT IN AN ORGANIZED HEALTH CARE EDUCATION/TRAINING PROGRAM

## 2024-12-05 PROCEDURE — 250N000013 HC RX MED GY IP 250 OP 250 PS 637: Performed by: STUDENT IN AN ORGANIZED HEALTH CARE EDUCATION/TRAINING PROGRAM

## 2024-12-05 PROCEDURE — G0463 HOSPITAL OUTPT CLINIC VISIT: HCPCS | Performed by: INTERNAL MEDICINE

## 2024-12-05 PROCEDURE — 49083 ABD PARACENTESIS W/IMAGING: CPT | Performed by: PHYSICIAN ASSISTANT

## 2024-12-05 PROCEDURE — 272N000502 HC NEEDLE CR3

## 2024-12-05 PROCEDURE — 84153 ASSAY OF PSA TOTAL: CPT | Performed by: INTERNAL MEDICINE

## 2024-12-05 PROCEDURE — 89050 BODY FLUID CELL COUNT: CPT | Performed by: STUDENT IN AN ORGANIZED HEALTH CARE EDUCATION/TRAINING PROGRAM

## 2024-12-05 PROCEDURE — 84157 ASSAY OF PROTEIN OTHER: CPT | Performed by: STUDENT IN AN ORGANIZED HEALTH CARE EDUCATION/TRAINING PROGRAM

## 2024-12-05 PROCEDURE — 82040 ASSAY OF SERUM ALBUMIN: CPT | Performed by: INTERNAL MEDICINE

## 2024-12-05 PROCEDURE — 87070 CULTURE OTHR SPECIMN AEROBIC: CPT | Performed by: STUDENT IN AN ORGANIZED HEALTH CARE EDUCATION/TRAINING PROGRAM

## 2024-12-05 PROCEDURE — 0W9G3ZZ DRAINAGE OF PERITONEAL CAVITY, PERCUTANEOUS APPROACH: ICD-10-PCS | Performed by: PHYSICIAN ASSISTANT

## 2024-12-05 PROCEDURE — 85048 AUTOMATED LEUKOCYTE COUNT: CPT | Performed by: INTERNAL MEDICINE

## 2024-12-05 PROCEDURE — 85027 COMPLETE CBC AUTOMATED: CPT | Performed by: INTERNAL MEDICINE

## 2024-12-05 PROCEDURE — 82310 ASSAY OF CALCIUM: CPT | Performed by: INTERNAL MEDICINE

## 2024-12-05 PROCEDURE — 85041 AUTOMATED RBC COUNT: CPT | Performed by: INTERNAL MEDICINE

## 2024-12-05 PROCEDURE — 120N000009 HC R&B SNF

## 2024-12-05 PROCEDURE — 85025 COMPLETE CBC W/AUTO DIFF WBC: CPT | Performed by: INTERNAL MEDICINE

## 2024-12-05 PROCEDURE — 36415 COLL VENOUS BLD VENIPUNCTURE: CPT | Performed by: INTERNAL MEDICINE

## 2024-12-05 RX ORDER — DIPHENHYDRAMINE HYDROCHLORIDE 50 MG/ML
25 INJECTION INTRAMUSCULAR; INTRAVENOUS
Start: 2024-12-26

## 2024-12-05 RX ORDER — EPINEPHRINE 1 MG/ML
0.3 INJECTION, SOLUTION INTRAMUSCULAR; SUBCUTANEOUS EVERY 5 MIN PRN
OUTPATIENT
Start: 2024-12-26

## 2024-12-05 RX ORDER — ALBUTEROL SULFATE 90 UG/1
1-2 INHALANT RESPIRATORY (INHALATION)
Start: 2024-12-12

## 2024-12-05 RX ORDER — DIPHENHYDRAMINE HYDROCHLORIDE 50 MG/ML
50 INJECTION INTRAMUSCULAR; INTRAVENOUS
Start: 2024-12-26

## 2024-12-05 RX ORDER — ALBUTEROL SULFATE 90 UG/1
1-2 INHALANT RESPIRATORY (INHALATION)
Start: 2024-12-26

## 2024-12-05 RX ORDER — METHYLPREDNISOLONE SODIUM SUCCINATE 40 MG/ML
40 INJECTION INTRAMUSCULAR; INTRAVENOUS
Start: 2024-12-12

## 2024-12-05 RX ORDER — MEPERIDINE HYDROCHLORIDE 25 MG/ML
25 INJECTION INTRAMUSCULAR; INTRAVENOUS; SUBCUTANEOUS
OUTPATIENT
Start: 2024-12-26

## 2024-12-05 RX ORDER — EPINEPHRINE 1 MG/ML
0.3 INJECTION, SOLUTION INTRAMUSCULAR; SUBCUTANEOUS EVERY 5 MIN PRN
OUTPATIENT
Start: 2024-12-12

## 2024-12-05 RX ORDER — METHYLPREDNISOLONE SODIUM SUCCINATE 40 MG/ML
40 INJECTION INTRAMUSCULAR; INTRAVENOUS
Start: 2024-12-26

## 2024-12-05 RX ORDER — LIDOCAINE 4 G/G
1 PATCH TOPICAL
Status: DISCONTINUED | OUTPATIENT
Start: 2024-12-05 | End: 2024-12-06 | Stop reason: HOSPADM

## 2024-12-05 RX ORDER — LIDOCAINE 4 G/G
1 PATCH TOPICAL
Status: DISCONTINUED | OUTPATIENT
Start: 2024-12-05 | End: 2024-12-05

## 2024-12-05 RX ORDER — ALBUTEROL SULFATE 0.83 MG/ML
2.5 SOLUTION RESPIRATORY (INHALATION)
OUTPATIENT
Start: 2024-12-12

## 2024-12-05 RX ORDER — ALBUTEROL SULFATE 0.83 MG/ML
2.5 SOLUTION RESPIRATORY (INHALATION)
OUTPATIENT
Start: 2024-12-26

## 2024-12-05 RX ORDER — MEPERIDINE HYDROCHLORIDE 25 MG/ML
25 INJECTION INTRAMUSCULAR; INTRAVENOUS; SUBCUTANEOUS
OUTPATIENT
Start: 2024-12-12

## 2024-12-05 RX ORDER — DIPHENHYDRAMINE HYDROCHLORIDE 50 MG/ML
25 INJECTION INTRAMUSCULAR; INTRAVENOUS
Start: 2024-12-12

## 2024-12-05 RX ORDER — DIPHENHYDRAMINE HYDROCHLORIDE 50 MG/ML
50 INJECTION INTRAMUSCULAR; INTRAVENOUS
Start: 2024-12-12

## 2024-12-05 RX ADMIN — ASPIRIN 81 MG CHEWABLE TABLET 81 MG: 81 TABLET CHEWABLE at 08:04

## 2024-12-05 RX ADMIN — METFORMIN HYDROCHLORIDE 500 MG: 500 TABLET, FILM COATED ORAL at 18:23

## 2024-12-05 RX ADMIN — LIDOCAINE 1 PATCH: 4 PATCH TOPICAL at 20:01

## 2024-12-05 RX ADMIN — LIDOCAINE HYDROCHLORIDE 5 ML: 10 INJECTION, SOLUTION EPIDURAL; INFILTRATION; INTRACAUDAL; PERINEURAL at 12:41

## 2024-12-05 RX ADMIN — METOPROLOL SUCCINATE 200 MG: 200 TABLET, EXTENDED RELEASE ORAL at 08:05

## 2024-12-05 RX ADMIN — OXYCODONE HYDROCHLORIDE 10 MG: 5 TABLET ORAL at 01:02

## 2024-12-05 RX ADMIN — PREDNISONE 5 MG: 5 TABLET ORAL at 08:06

## 2024-12-05 RX ADMIN — PANTOPRAZOLE SODIUM 40 MG: 40 TABLET, DELAYED RELEASE ORAL at 08:05

## 2024-12-05 RX ADMIN — ACETAMINOPHEN 650 MG: 325 TABLET, FILM COATED ORAL at 16:20

## 2024-12-05 RX ADMIN — OXYCODONE HYDROCHLORIDE 10 MG: 5 TABLET ORAL at 16:20

## 2024-12-05 RX ADMIN — Medication 950 MG: at 16:20

## 2024-12-05 RX ADMIN — ACETAMINOPHEN 650 MG: 325 TABLET, FILM COATED ORAL at 01:02

## 2024-12-05 RX ADMIN — SPIRONOLACTONE 50 MG: 50 TABLET ORAL at 08:06

## 2024-12-05 RX ADMIN — CIPROFLOXACIN HYDROCHLORIDE 500 MG: 500 TABLET, FILM COATED ORAL at 08:06

## 2024-12-05 ASSESSMENT — ACTIVITIES OF DAILY LIVING (ADL)
ADLS_ACUITY_SCORE: 62

## 2024-12-05 ASSESSMENT — PAIN SCALES - GENERAL: PAINLEVEL_OUTOF10: SEVERE PAIN (7)

## 2024-12-05 NOTE — NURSING NOTE
"Oncology Rooming Note    December 5, 2024 9:58 AM   Gucci Logan is a 74 year old male who presents for:    Chief Complaint   Patient presents with    Blood Draw     Labs drawn via  by RN.     Oncology Clinic Visit     RTN for Prostate cancer     Initial Vitals: /65   Pulse 61   Temp 98.1  F (36.7  C) (Oral)   Resp 18   SpO2 99%  Estimated body mass index is 36.31 kg/m  as calculated from the following:    Height as of 11/16/24: 1.803 m (5' 11\").    Weight as of 12/2/24: 118.1 kg (260 lb 5.8 oz). There is no height or weight on file to calculate BSA.  Severe Pain (7) Comment: Data Unavailable   No LMP for male patient.  Allergies reviewed: Yes  Medications reviewed: Yes    Medications: Medication refills not needed today.  Pharmacy name entered into EPIC:    [id number] - [place], [place] - [address] [facility]  [facility] - [place], [place] - [address] [place] [address] [address] [place] [address] [facility] [address] [facility]  [facility] - [place], [place] - [address] [facility]AC[facility] [place], [place] - [address] [facility] [phone]    Frailty Screening:   Is the patient here for a new oncology consult visit in cancer care? 2. No      Clinical concerns: none       Judie Barrett MA             "

## 2024-12-05 NOTE — IR NOTE
Patient Name: Gucci Logan  Medical Record Number: [id number]  Today's Date: 12/5/2024    Procedure: Paracentesis  Proceduralist: Aniket Mart PA-C  Pathology present: No    Procedure Start: 1240  Procedure end: 1310  Sedation medications administered: Local lidocaine     Report given to: Christina GUAJARDO  : JUAN    Other Notes: Pt arrived to IR room pre-post 1 from [facility]. Consent reviewed. Pt denies any questions or concerns regarding procedure. Pt positioned supine and monitored per protocol. Pt tolerated procedure without any noted complications. Pt transferred back to R424.      4000 ml ascites fluid removed.

## 2024-12-05 NOTE — TELEPHONE ENCOUNTER
Patient notified of provider message as written. He states that he had hip Xray last night so results will be in his chart. He plans to follow up with Dr. Jaramillo next week as well.    Prema ALEX, RN  [facility]

## 2024-12-05 NOTE — PLAN OF CARE
Goal Outcome Evaluation:      Plan of Care Reviewed With: patient    Overall Patient Progress: improvingOverall Patient Progress: improving    FOCUS/GOAL     Bowel management, Bladder management, Medication management, Wound care management, Medical management, Mobility, Skin integrity, and Safety management     ASSESSMENT, INTERVENTIONS AND CONTINUING PLAN FOR GOAL:     Pt is A&OX4, calm, & cooperative with care. Denied CP, SOB, & n/v. VSS & on RA. Pt is MOD I with walker but requests assist to get legs up on the bed. Continent for both B&B. On a regular diet with a FR of 1500 mL. Takes meds whole with thin liquid. Managed L hip, lower back, & abd pain with PRN oxycodone and tylenol. Bilateral tubiGrips removed at bedtime. Pt had XR to his left hip this shift. Pt ate dinner with good appetite & no other acute concern. Able to make needs known & call light within reach. Continue with plan of care.    Patient's most recent vital signs are:     Vital signs:  BP: 123/55  Temp: 98  HR: 71  RR: 16  SpO2: 97 %     Patient does not have new respiratory symptoms.  Patient does not have new sore throat.  Patient does not have a fever greater than 99.5.

## 2024-12-05 NOTE — PROGRESS NOTES
12/05/24 1400   Appointment Info   Signing Clinician's Name / Credentials (PT) HECTOR Brown   PT Assistant Visit Number 3   Student Supervision Line of sight supervision provided   Therapeutic Activity   Therapeutic Activities: dynamic activities to improve functional performance Minutes (94301) 45   Symptoms Noted During/After Treatment Increased pain;Fatigue   Treatment Detail/Skilled Intervention PT: Focused on IND day activities (see ggs...). Upon approach, pt returning from paracentesis appt and th assisted pt off gurney, CGA and pt ambu into room w/ FWW, 15 ft x1. Pt requires heavy cues to focus and stay on task, exhibited pain in LLE and fatigue following ambu bout. Fcilitated pt finding appropriate 4WW for pt to purchase online.   PT Discharge Planning   PT Plan PT: d/c   PT Discharge Recommendation (DC Rec) home with assist;home with home care physical therapy   PT Rationale for DC Rec PT: Pt lives alone in [facility] and a farm house outside [place]. Pt refuses to consider staying at one level living in [place]   PT Brief overview of current status PT: generally CGA for all functional activities. uses FWW and 4WW   PT Equipment Needed at Discharge walker, rolling   Physical Therapy Time and Intention   Timed Code Treatment Minutes 45   Total Session Time (sum of timed and untimed services) 45   Post Acute Settings Only   What unit is patient on? TCU   PT - Transitional Care Unit Time   Individual Time (minutes) - PT 45   TCU Total Session Time (minutes) - PT 45   TCU Daily Total Session Time   PT TCU Daily Total Session Time 45   Rehab TCU Daily Total Session Time 45   Bed Mobility: Turning side to side/Roll Left and Right   Patient Performance Independent   Staff Performance No setup or physical help (No setup or physical help from staff)   Describe Performance PT: Mod-I w/ bed rail to scoot backward towards middle of bed.   Bed Mobility: Lying to sitting on the side of bed   Patient  Performance Independent   Staff Performance No set up or physical help (No set up or physical help from staff)   Describe Performance PT: Mod-I w/ bed rail to pull self up to EOB.   Transfers: Sit to Stand   Patient Performance Independent   Equipment Used Rolling walker   Describe Performance PT: STS from recliner x1 and w/c x1   Transfers: Chair/Bed transfers   Staff Performance No set up or physical help (No set up or physical help from staff)   Equipment Used Rolling walker   Walk 10 Feet - Ability to walk once standing   Patient Performance Independent   Mobility device used rolling walker   Describe Performance PT: Pt performed ambu bout 186 ft x1 and 15 ft x1 w/ FWW. Pt is IND w/ short bouts, but requires SUP ~50+ ft.   Walk 10 Feet on uneven surfaces - Ability to walk on various surfaces.   Patient Performance Supervision or verbal cues   Mobility device used rolling walker   Describe Performance PT: Pt performed ambu bout 186 ft x1 and 15 ft x1 w/ FWW. Pt is IND w/ short bouts, but requires SUP ~50+ ft.   Walk 50 Feet with Two Turns - Ability to walk at least 50 feet.   Patient Performance Supervision or verbal cues   Mobility device used rolling walker   Describe Performance PT: Pt performed ambu bout 186 ft x1 and 15 ft x1 w/ FWW. Pt is IND w/ short bouts, but requires SUP ~50+ ft.   Walk 150 Feet - Ability to walk 150 feet   Patient Performance Supervision or verbal cues   Mobility device used rolling walker   Describe Performance PT: Pt performed ambu bout 186 ft x1 and 15 ft x1 w/ FWW. Pt is IND w/ short bouts, but requires SUP ~50+ ft.   Wheel 50 Feet - Ability to move wheelchair/scooter   Reason Not Done Activity not applicable   Wheel 150 Feet - Ability to move wheelchair/scooter   Reason Not Done Activity not applicable   1 Step (curb) - Ability to go up/down 1 step/curb.   Reason Not Done Resident refused to perform   Describe Performance PT: attempted, but pt refused d/t fear of falling   12  Steps - Ability to go up/down steps.   Reason Not Done Activity not applicable   Picking up Object - Ability to bend/stoop while standing.   Patient Performance Independent   Describe Performance PT: Mod-I w/ reacher
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
## 2024-12-05 NOTE — PLAN OF CARE
Goal Outcome Evaluation:      Plan of Care Reviewed With: patient    Overall Patient Progress: improvingOverall Patient Progress: improving    FOCUS/GOAL     Bowel management, Bladder management, Medication management, Wound care management, Medical management, Mobility, Skin integrity, and Safety management     ASSESSMENT, INTERVENTIONS AND CONTINUING PLAN FOR GOAL:     Pt is A&OX4, calm, & cooperative with care. Denied CP, SOB, & n/v. VSS & on RA. Pt is MOD I with walker but looks weaker than the previous day; was not OOB this shift. Continent for both B&B. On a regular diet with a FR of 1500 mL. Takes meds whole with thin liquid. Managed L hip, lower back, & abd pain with PRN oxycodone and tylenol. Bilateral tubiGrips removed at bedtime. 4,000 mL ascites fluid aspirated today & applied mepilex dressing to the paracentesis site. Pt is scheduled to discharge tomorrow (12/06) around 1900. Pt ate dinner with fair appetite & no other acute concern. Able to make needs known & call light within reach. Continue with plan of care.    Patient's most recent vital signs are:     Vital signs:  BP: 118/46  Temp: 98  HR: 65  RR: 18  SpO2: 98 %     Patient does not have new respiratory symptoms.  Patient does not have new sore throat.  Patient does not have a fever greater than 99.5.

## 2024-12-05 NOTE — CARE PLAN
RN: Lab called to report fluid from paracentesis for differential will be sent  to [facility] and may be delay posting..  states cell count is posting on [facility] lab results.

## 2024-12-05 NOTE — PLAN OF CARE
"Goal Outcome Evaluation:  VS: /50 (BP Location: Right arm)   Pulse 63   Temp 98.6  F (37  C) (Oral)   Resp 18   Ht 1.803 m (5' 11\")   Wt 118.1 kg (260 lb 5.8 oz)   SpO2 98%   BMI 36.31 kg/m     O2: RA   Output: Urinal at bedside   Last BM: 12/3   Activity: ONC appt, paracentesis, PT  Pt in recliner   Skin: X: coccyx   Pain: L hip (provider ordered Lidocaine patch but not in Pyxis yet)   CMS:  Neuro: Intact X: BLE edema. Pt \"feels heavy\" today needs CGA-2  A&O, forgetful   Dressing: Coccyx Mepilex  Bilateral tubigrips on   Diet: Reg  FT 1500 mL   LDA:    Equipment: STACI, VINCENT, w/c   Plan: Con't POC.    Additional Info: Pt had ONC appt w/ infusion in AM; paracentesis in afternoon.   Pt plans to discharge Fri 12/6 in evening; has appt at 1300 then will come back to TCU.      Patient's most recent vital signs are:     Vital signs:  BP: 113/50  Temp: 98.6  HR: 63  RR: 18  SpO2: 98 %     Patient does not have new respiratory symptoms.  Patient does not have new sore throat.  Patient does not have a fever greater than 99.5.      Plan of Care Reviewed With: patient  "

## 2024-12-05 NOTE — PLAN OF CARE
"Goal Outcome Evaluation:    Plan of Care Reviewed With: patient    Overall Patient Progress: no change    Outcome Evaluation: Pt in a good mood during encounters so far this shift. Recalled times when he was very frustrated and was hoping he was  \"not too mean\" during those times. RN acknowledged feelings were from frustrations and its good to see him back to his old self. Pt's 7/10 abdominal, back and L hip pain comfortably managed with Oxycodone 10 mg tab and Tylenol 650 mg tab  per pt request  x 1 so far this shift. See L hip Xray result from yesterday. Abdomen distended d/t ascites-  IR consult for paracentesis in place and pt hoping it will be completed today.  Last done on 11/26/24. Reminded pt of lab and Oncology appt in [place] with  time between [id number]. Pt would like to get ready and take his AM pills at 0800. Will inform AM staff. Using urinal in bed. Staff empties.  Pt has no c/o SOB and no s/s of respiratory issue noted at RA. Appear to be sleeping/resting between cares/ meds. Continue with plan of care.    Patient's most recent vital signs are:     Vital signs:  BP: 123/55  Temp: 98  HR: 71  RR: 16  SpO2: 97 %     Patient does not have new respiratory symptoms.  Patient does not have new sore throat.  Patient does not have a fever greater than 99.5.        "

## 2024-12-05 NOTE — NURSING NOTE
Eligard given into RLQ abdomen without incident. Pt tolerated well.    Michaelle Degroot RN on 12/5/2024 at 11:34 AM

## 2024-12-05 NOTE — CONSULTS
"    Interventional Radiology   [facility] Consult Service Note  12/05/24       Consult Requested:  Paracentesis  Plan:      Ordered for  IR diagnostic and therapeutic paracentesis.  LIMIT 4L Removal.    Please contact the IR control at [phone] for estimated time of procedure.   NPO status:  NA  Labs Reviewed and appropriate to proceed  Medications to be held:  None  Consent already on file.      History:  Gucci Logan is a 74 year old male admitted 11/16/2024.    history of HTN, metastatic prostate cancer, cirrhosis secondary to chronic HCV infection who presented to hospital (10/29 - 11/16) for a heart failure exacerbation 2/2 hypervolemia. He was started on diuretics.   Patient was also found to have SBP and received antibiotics. He also was found with RENNY and diuretic was held. Transferred to TCU for ongoing rehab needs.      Pertinent Imaging:  Last para 1/26/2024 with removal of 3900 mL    Vitals:   /55 (BP Location: Left arm, Patient Position: Sitting, Cuff Size: Adult Regular)   Pulse 71   Temp 98  F (36.7  C) (Oral)   Resp 16   Ht 1.803 m (5' 11\")   Wt 118.1 kg (260 lb 5.8 oz)   SpO2 97%   BMI 36.31 kg/m      Pertinent Labs:   Lab Results   Component Value Date    WBC 2.9 12/02/2024    WBC 4.5 04/05/2021     Lab Results   Component Value Date    RBC 2.84 12/02/2024    RBC 4.29 04/05/2021     Lab Results   Component Value Date    HGB 9.7 12/02/2024    HGB 14.1 04/05/2021     Lab Results   Component Value Date    HCT 30.4 12/02/2024    HCT 42.1 04/05/2021     Lab Results   Component Value Date     12/02/2024    MCV 98 04/05/2021     Lab Results   Component Value Date    MCH 34.2 12/02/2024    MCH 32.9 04/05/2021     Lab Results   Component Value Date    MCHC 31.9 12/02/2024    MCHC 33.5 04/05/2021     Lab Results   Component Value Date    RDW 17.4 12/02/2024    RDW 12.3 04/05/2021     Lab Results   Component Value Date    PLT 45 12/02/2024    PLT 63 04/05/2021    Lab Results "   Component Value Date    INR 1.32 (H) 08/19/2024    INR 1.14 04/05/2021    PTT 27 02/20/2023    PTT 30 07/08/2020      Lab Results   Component Value Date    POTASSIUM 4.2 12/02/2024    POTASSIUM 4.1 01/18/2023    POTASSIUM 4.3 04/05/2021          Doris Lockhart PA-C  Interventional Radiology  Pager: [phone]  Pager: [phone]

## 2024-12-06 VITALS
DIASTOLIC BLOOD PRESSURE: 49 MMHG | HEART RATE: 63 BPM | BODY MASS INDEX: 36.45 KG/M2 | OXYGEN SATURATION: 99 % | HEIGHT: 71 IN | WEIGHT: 260.36 LBS | SYSTOLIC BLOOD PRESSURE: 116 MMHG | RESPIRATION RATE: 16 BRPM | TEMPERATURE: 97.8 F

## 2024-12-06 LAB
ABSOLUTE NEUTROPHILS, BODY FLUID: 21.3 /UL
LYMPHOCYTES NFR FLD MANUAL: 20 %
MONOS+MACROS NFR FLD MANUAL: 48 %
NEUTS BAND NFR FLD MANUAL: 27 %
OTHER CELLS FLD MANUAL: 5 %
PATH REV: NORMAL

## 2024-12-06 PROCEDURE — 250N000012 HC RX MED GY IP 250 OP 636 PS 637: Performed by: STUDENT IN AN ORGANIZED HEALTH CARE EDUCATION/TRAINING PROGRAM

## 2024-12-06 PROCEDURE — 250N000013 HC RX MED GY IP 250 OP 250 PS 637: Performed by: STUDENT IN AN ORGANIZED HEALTH CARE EDUCATION/TRAINING PROGRAM

## 2024-12-06 PROCEDURE — 250N000013 HC RX MED GY IP 250 OP 250 PS 637: Performed by: INTERNAL MEDICINE

## 2024-12-06 RX ORDER — PANTOPRAZOLE SODIUM 40 MG/1
40 TABLET, DELAYED RELEASE ORAL
Qty: 30 TABLET | Refills: 0 | Status: SHIPPED | OUTPATIENT
Start: 2024-12-06 | End: 2024-12-11

## 2024-12-06 RX ORDER — LIDOCAINE 4 G/G
1 PATCH TOPICAL EVERY 24 HOURS
Qty: 20 PATCH | Refills: 0 | Status: SHIPPED | OUTPATIENT
Start: 2024-12-06 | End: 2024-12-06

## 2024-12-06 RX ORDER — SPIRONOLACTONE 50 MG/1
50 TABLET, FILM COATED ORAL DAILY
Qty: 30 TABLET | Refills: 0 | Status: SHIPPED | OUTPATIENT
Start: 2024-12-06 | End: 2024-12-06

## 2024-12-06 RX ORDER — POLYETHYLENE GLYCOL 3350 17 G/17G
17 POWDER, FOR SOLUTION ORAL DAILY
Qty: 510 G | Refills: 0 | Status: SHIPPED | OUTPATIENT
Start: 2024-12-06 | End: 2024-12-11

## 2024-12-06 RX ORDER — SPIRONOLACTONE 50 MG/1
50 TABLET, FILM COATED ORAL DAILY
Qty: 30 TABLET | Refills: 0 | Status: SHIPPED | OUTPATIENT
Start: 2024-12-06 | End: 2024-12-11

## 2024-12-06 RX ORDER — CIPROFLOXACIN 500 MG/1
500 TABLET, FILM COATED ORAL EVERY 24 HOURS
Qty: 30 TABLET | Refills: 1 | Status: SHIPPED | OUTPATIENT
Start: 2024-12-06 | End: 2024-12-06

## 2024-12-06 RX ORDER — LIDOCAINE 4 G/G
1 PATCH TOPICAL EVERY 24 HOURS
Qty: 20 PATCH | Refills: 0 | Status: SHIPPED | OUTPATIENT
Start: 2024-12-06 | End: 2024-12-11

## 2024-12-06 RX ORDER — CIPROFLOXACIN 500 MG/1
500 TABLET, FILM COATED ORAL EVERY 24 HOURS
Qty: 30 TABLET | Refills: 1 | Status: SHIPPED | OUTPATIENT
Start: 2024-12-06

## 2024-12-06 RX ORDER — PANTOPRAZOLE SODIUM 40 MG/1
40 TABLET, DELAYED RELEASE ORAL
Qty: 30 TABLET | Refills: 0 | Status: SHIPPED | OUTPATIENT
Start: 2024-12-06 | End: 2024-12-06

## 2024-12-06 RX ORDER — POLYETHYLENE GLYCOL 3350 17 G/17G
17 POWDER, FOR SOLUTION ORAL DAILY
Qty: 510 G | Refills: 0 | Status: SHIPPED | OUTPATIENT
Start: 2024-12-06 | End: 2024-12-06

## 2024-12-06 RX ADMIN — ASPIRIN 81 MG CHEWABLE TABLET 81 MG: 81 TABLET CHEWABLE at 08:35

## 2024-12-06 RX ADMIN — ACETAMINOPHEN 650 MG: 325 TABLET, FILM COATED ORAL at 15:05

## 2024-12-06 RX ADMIN — OXYCODONE HYDROCHLORIDE 10 MG: 5 TABLET ORAL at 15:05

## 2024-12-06 RX ADMIN — CIPROFLOXACIN HYDROCHLORIDE 500 MG: 500 TABLET, FILM COATED ORAL at 08:34

## 2024-12-06 RX ADMIN — POLYETHYLENE GLYCOL 3350 17 G: 17 POWDER, FOR SOLUTION ORAL at 16:23

## 2024-12-06 RX ADMIN — METFORMIN HYDROCHLORIDE 500 MG: 500 TABLET, FILM COATED ORAL at 08:35

## 2024-12-06 RX ADMIN — METOPROLOL SUCCINATE 200 MG: 200 TABLET, EXTENDED RELEASE ORAL at 08:35

## 2024-12-06 RX ADMIN — PANTOPRAZOLE SODIUM 40 MG: 40 TABLET, DELAYED RELEASE ORAL at 08:35

## 2024-12-06 RX ADMIN — PREDNISONE 5 MG: 5 TABLET ORAL at 08:35

## 2024-12-06 RX ADMIN — METFORMIN HYDROCHLORIDE 500 MG: 500 TABLET, FILM COATED ORAL at 16:24

## 2024-12-06 RX ADMIN — Medication 950 MG: at 16:23

## 2024-12-06 RX ADMIN — SPIRONOLACTONE 50 MG: 50 TABLET ORAL at 08:34

## 2024-12-06 ASSESSMENT — ACTIVITIES OF DAILY LIVING (ADL)
ADLS_ACUITY_SCORE: 62
ADLS_ACUITY_SCORE: 60
ADLS_ACUITY_SCORE: 62
ADLS_ACUITY_SCORE: 60
ADLS_ACUITY_SCORE: 62

## 2024-12-06 NOTE — PLAN OF CARE
Goal Outcome Evaluation:    Plan of Care Reviewed With: patient    Overall Patient Progress: improving    Outcome Evaluation: Pt will be discharging today to home. Home medications and instructions given and pt verbalized that he understood them.    Around 1827, he was discharged in fair condition, via wheelchair accompanied by friend. Pt purposely left in the room the polyethylene glycol bottle and the pantoprazole tablets. He told the NA that he doesn't need them. Writer will endorse to NOC CN to return back the said medications to the discharge pharmacy.

## 2024-12-06 NOTE — PLAN OF CARE
Goal Outcome Evaluation:      Plan of Care Reviewed With: patient      Overall Pt had no acute issue this shift. Pt noted to be sleeping through out the  shift. Pt discharging from the uni at around 1900. Pt going for an appointment prior to discharge from TCU. Pt on fluid restriction of 1500ML. None given this shift. Pt appears sleeping at this time. Will continue with POC

## 2024-12-06 NOTE — PROGRESS NOTES
Discharge Plan     Discharge Date: 12/6/24    Discharge Disposition:  home to [place]      Discharge Services: [facility]      ORDERS AND DISCHARGE SUMMARY WILL NEED TO BE FAXED .[phone]     Discharge Supplies: All DME supplied by PT/OT     Discharge Transportation: Friend    Pt had his light on so SW answered it.  He needed his walker.  SW asked him if he would like some PCA help?  He said only if someone paid for it or he could get reimbursed for it. SW said that neither of those options were possible if you don't have LTC insurance.  Pt denied wanting a list.     NICHOLE Thompson   [facility], Transitional Care Unit   Social Work   [facility] [place] [address], [address] [place] [address] (ph) [phone]

## 2024-12-06 NOTE — PLAN OF CARE
"Goal Outcome Evaluation:  VS: /53 (BP Location: Right arm)   Pulse 62   Temp 98.2  F (36.8  C) (Oral)   Resp 16   Ht 1.803 m (5' 11\")   Wt 118.1 kg (260 lb 5.8 oz)   SpO2 96%   BMI 36.31 kg/m      O2: RA   Output: Urinal at bedside   Last BM: 12/5   Activity: Cardiac Appt  Pt in recliner, TV, preparing for discharge   Skin: X: coccyx intact   Pain: L hip   CMS:  Neuro: Intact X: BLE edema 3+  A&O, forgetful   Dressing: Coccyx; pt requested Mepilex removed DT discharge; skin intact, barrier cream applied.   Bilateral tubigrips off   Diet: Reg  FR 1500 mL   LDA:     Equipment: WW, GB, w/c   Plan: Con't POC.    Additional Info: Pt to discharge between [id number]. Discharge paperwork reviewed w/ pt and put back in pt chart per pt request. Pt had already discussed concerns about paracentesis w/  and provider and was reminded to contact [facility] to arrange schedule. Pt forgetful about this step.     Discharge paperwork and meds reviewed w/ pt. Meds were not on unit yet as pt changed mind about filling them at  vs. [facility]. Meds ordered from  and should be here late afternoon.   Questions encouraged, asked, and answered.   Pt's personal belongings are in room. Personal meds (oxy and chemo med) are in locked nurse's cabinet. Pls have pt sign receipt of meds prior to discharge.      Pt upset upon returning from appt. Therapeutic listening provided. Pt requested Tylenol and Oxy, settled in room, and making phone calls.      Patient's most recent vital signs are:     Vital signs:  BP: 125/53  Temp: 98.2  HR: 62  RR: 16  SpO2: 96 %     Patient does not have new respiratory symptoms.  Patient does not have new sore throat.  Patient does not have a fever greater than 99.5.      Plan of Care Reviewed With: patient  "

## 2024-12-10 ENCOUNTER — TELEPHONE (OUTPATIENT)
Dept: FAMILY MEDICINE | Facility: CLINIC | Age: 74
End: 2024-12-10
Payer: COMMERCIAL

## 2024-12-10 LAB
BACTERIA FLD CULT: NO GROWTH
GRAM STAIN RESULT: NORMAL
GRAM STAIN RESULT: NORMAL

## 2024-12-10 NOTE — LETTER
December 10, 2024      Gucci Logan  [address]  [facility] [address]        Dear Gucci,           When you discharged from the hospital a referral was placed for you for to schedule a Medication Therapy Management (MTM) appointment. MTM is designed to help you get the most of out of your medicines.      During an MTM appointment a specially trained pharmacist will review all of your medicines, both prescription and over-the-counter. They will make sure your medicines are the best choice for you and are safe and convenient for you.  MTM pharmacists work together with you and your doctor to help you understand your medicines, solve any problems related to your medicines and help you get the best results from taking your medicines.      At [facility], we strongly believe in a team approach to health care. We want to help you understand your medicines and health conditions. To learn more about how you might benefit from MTM services, watch the patient video at [email or web address].      To make an appointment, please call the MTM scheduling line at [phone] (toll-free at [phone]).     We look forward to hearing from you!           [facility] MTM Team

## 2024-12-10 NOTE — TELEPHONE ENCOUNTER
MTM referral from: Transitions of Care (recent hospital discharge, TCU discharge, or ED visit)    MTM referral outreach attempt #2 on December 10, 2024 at 12:59 PM      Outcome: Patient not reachable after several attempts, routed to Pharmacist Team/Provider as an FYI    Use [facility]are part d map  for the carrier/Plan on the flowsheet      [facility] Practitioner please send patient letter    Janet Mendoza MT   [phone]

## 2024-12-11 ENCOUNTER — OFFICE VISIT (OUTPATIENT)
Dept: FAMILY MEDICINE | Facility: CLINIC | Age: 74
End: 2024-12-11
Payer: COMMERCIAL

## 2024-12-11 VITALS
RESPIRATION RATE: 18 BRPM | WEIGHT: 260 LBS | SYSTOLIC BLOOD PRESSURE: 126 MMHG | OXYGEN SATURATION: 99 % | HEIGHT: 70 IN | TEMPERATURE: 97.3 F | HEART RATE: 64 BPM | DIASTOLIC BLOOD PRESSURE: 63 MMHG | BODY MASS INDEX: 37.22 KG/M2

## 2024-12-11 DIAGNOSIS — R18.8 CIRRHOSIS OF LIVER WITH ASCITES, UNSPECIFIED HEPATIC CIRRHOSIS TYPE (H): ICD-10-CM

## 2024-12-11 DIAGNOSIS — C79.51 METASTASIS TO BONE (H): ICD-10-CM

## 2024-12-11 DIAGNOSIS — R18.8 OTHER ASCITES: ICD-10-CM

## 2024-12-11 DIAGNOSIS — C79.51 PROSTATE CANCER METASTATIC TO BONE (H): ICD-10-CM

## 2024-12-11 DIAGNOSIS — E13.9 DIABETES 1.5, MANAGED AS TYPE 2 (H): ICD-10-CM

## 2024-12-11 DIAGNOSIS — I50.9 HEART FAILURE, UNSPECIFIED HF CHRONICITY, UNSPECIFIED HEART FAILURE TYPE (H): ICD-10-CM

## 2024-12-11 DIAGNOSIS — E55.9 VITAMIN D DEFICIENCY DISEASE: ICD-10-CM

## 2024-12-11 DIAGNOSIS — C61 PROSTATE CANCER (H): ICD-10-CM

## 2024-12-11 DIAGNOSIS — M25.552 HIP PAIN, LEFT: Primary | ICD-10-CM

## 2024-12-11 DIAGNOSIS — I89.0 LYMPHEDEMA: ICD-10-CM

## 2024-12-11 DIAGNOSIS — C77.8 MALIGNANT NEOPLASM METASTATIC TO LYMPH NODES OF MULTIPLE SITES (H): ICD-10-CM

## 2024-12-11 DIAGNOSIS — E11.43 TYPE 2 DIABETES MELLITUS WITH DIABETIC AUTONOMIC NEUROPATHY, WITHOUT LONG-TERM CURRENT USE OF INSULIN (H): ICD-10-CM

## 2024-12-11 DIAGNOSIS — K74.60 CIRRHOSIS OF LIVER WITH ASCITES, UNSPECIFIED HEPATIC CIRRHOSIS TYPE (H): ICD-10-CM

## 2024-12-11 DIAGNOSIS — D64.9 ANEMIA, UNSPECIFIED TYPE: ICD-10-CM

## 2024-12-11 DIAGNOSIS — K21.9 GASTROESOPHAGEAL REFLUX DISEASE, UNSPECIFIED WHETHER ESOPHAGITIS PRESENT: ICD-10-CM

## 2024-12-11 DIAGNOSIS — I10 HYPERTENSION GOAL BP (BLOOD PRESSURE) < 140/90: ICD-10-CM

## 2024-12-11 DIAGNOSIS — I50.32 CHRONIC DIASTOLIC HEART FAILURE (H): ICD-10-CM

## 2024-12-11 DIAGNOSIS — Z87.81 S/P LEFT HIP FRACTURE: ICD-10-CM

## 2024-12-11 DIAGNOSIS — C61 PROSTATE CANCER METASTATIC TO BONE (H): ICD-10-CM

## 2024-12-11 LAB
BASOPHILS # BLD AUTO: 0 10E3/UL (ref 0–0.2)
BASOPHILS NFR BLD AUTO: 0 %
EOSINOPHIL # BLD AUTO: 0.1 10E3/UL (ref 0–0.7)
EOSINOPHIL NFR BLD AUTO: 3 %
ERYTHROCYTE [DISTWIDTH] IN BLOOD BY AUTOMATED COUNT: 16.6 % (ref 10–15)
EST. AVERAGE GLUCOSE BLD GHB EST-MCNC: 100 MG/DL
HBA1C MFR BLD: 5.1 % (ref 0–5.6)
HCT VFR BLD AUTO: 32.7 % (ref 40–53)
HGB BLD-MCNC: 10.4 G/DL (ref 13.3–17.7)
IMM GRANULOCYTES # BLD: 0 10E3/UL
IMM GRANULOCYTES NFR BLD: 0 %
LYMPHOCYTES # BLD AUTO: 0.7 10E3/UL (ref 0.8–5.3)
LYMPHOCYTES NFR BLD AUTO: 17 %
MCH RBC QN AUTO: 34 PG (ref 26.5–33)
MCHC RBC AUTO-ENTMCNC: 31.8 G/DL (ref 31.5–36.5)
MCV RBC AUTO: 107 FL (ref 78–100)
MONOCYTES # BLD AUTO: 0.4 10E3/UL (ref 0–1.3)
MONOCYTES NFR BLD AUTO: 9 %
NEUTROPHILS # BLD AUTO: 3 10E3/UL (ref 1.6–8.3)
NEUTROPHILS NFR BLD AUTO: 71 %
NRBC # BLD AUTO: 0 10E3/UL
NRBC BLD AUTO-RTO: 0 /100
PLATELET # BLD AUTO: 55 10E3/UL (ref 150–450)
RBC # BLD AUTO: 3.06 10E6/UL (ref 4.4–5.9)
WBC # BLD AUTO: 4.3 10E3/UL (ref 4–11)

## 2024-12-11 PROCEDURE — 36415 COLL VENOUS BLD VENIPUNCTURE: CPT | Performed by: FAMILY MEDICINE

## 2024-12-11 PROCEDURE — 82306 VITAMIN D 25 HYDROXY: CPT | Performed by: FAMILY MEDICINE

## 2024-12-11 PROCEDURE — 99214 OFFICE O/P EST MOD 30 MIN: CPT | Performed by: FAMILY MEDICINE

## 2024-12-11 PROCEDURE — 83036 HEMOGLOBIN GLYCOSYLATED A1C: CPT | Performed by: FAMILY MEDICINE

## 2024-12-11 PROCEDURE — 85025 COMPLETE CBC W/AUTO DIFF WBC: CPT | Performed by: FAMILY MEDICINE

## 2024-12-11 PROCEDURE — 80053 COMPREHEN METABOLIC PANEL: CPT | Performed by: FAMILY MEDICINE

## 2024-12-11 RX ORDER — CHOLECALCIFEROL (VITAMIN D3) 50 MCG
1 TABLET ORAL DAILY
Qty: 120 TABLET | Refills: 3 | Status: SHIPPED | OUTPATIENT
Start: 2024-12-11

## 2024-12-11 RX ORDER — PREDNISONE 5 MG/1
5 TABLET ORAL
Qty: 60 TABLET | Refills: 1 | Status: SHIPPED | OUTPATIENT
Start: 2024-12-11

## 2024-12-11 RX ORDER — LIDOCAINE 4 G/G
1 PATCH TOPICAL EVERY 24 HOURS
Qty: 20 PATCH | Refills: 1 | Status: SHIPPED | OUTPATIENT
Start: 2024-12-11

## 2024-12-11 RX ORDER — OXYCODONE HYDROCHLORIDE 5 MG/1
5-10 TABLET ORAL EVERY 6 HOURS PRN
Qty: 70 TABLET | Refills: 0 | Status: SHIPPED | OUTPATIENT
Start: 2024-12-11

## 2024-12-11 RX ORDER — OMEPRAZOLE 20 MG/1
20 TABLET, DELAYED RELEASE ORAL DAILY
Qty: 90 TABLET | Refills: 3 | Status: SHIPPED | OUTPATIENT
Start: 2024-12-11

## 2024-12-11 RX ORDER — SPIRONOLACTONE 50 MG/1
50 TABLET, FILM COATED ORAL DAILY
Qty: 30 TABLET | Refills: 1 | Status: SHIPPED | OUTPATIENT
Start: 2024-12-11

## 2024-12-11 RX ORDER — METOPROLOL SUCCINATE 100 MG/1
TABLET, EXTENDED RELEASE ORAL
Qty: 180 TABLET | Refills: 1 | Status: SHIPPED | OUTPATIENT
Start: 2024-12-11

## 2024-12-11 RX ORDER — POLYETHYLENE GLYCOL 3350 17 G/17G
17 POWDER, FOR SOLUTION ORAL DAILY PRN
Qty: 255 G | Refills: 1 | Status: SHIPPED | OUTPATIENT
Start: 2024-12-11

## 2024-12-11 ASSESSMENT — PAIN SCALES - GENERAL: PAINLEVEL_OUTOF10: SEVERE PAIN (6)

## 2024-12-11 NOTE — PROGRESS NOTES
"      Lakhwinder Lemos is a 74 year old, presenting for the following health issues:  RECHECK        12/11/2024    12:04 PM   Additional Questions   Roomed by Sofia Conde     HPI       Patient had long hospitalization  Then to transitional care    Reviewed hospital summary in detail    Up to 200 mg metoprolol daily     4-5 pain pills daily 5 mg oxycodone    Has walker but difficult due to pain          Objective    /63 (BP Location: Left arm, Patient Position: Chair, Cuff Size: Adult Regular)   Pulse 64   Temp 97.3  F (36.3  C) (Temporal)   Resp 18   Ht 1.778 m (5' 10\")   Wt 117.9 kg (260 lb)   SpO2 99%   BMI 37.31 kg/m    Body mass index is 37.31 kg/m .  Physical Exam   Patient sitting in wheelchair    Heart and lungs okay here    Abd moderately distended but not tense    Has moderately severe pretibial edema, symmetric both legs    He is able to stand by grabbing onto door handle    Has a very slight one cm open area left buttock area , very superficial;  no buttock cellulitis. No drainage.    He is quite weak left leg.     Has an almost avulsed right great toenail.      Radials symmetric    Labs pending      1. Hip pain, left    2. 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 Heart failure, unspecified HF chronicity, unspecified heart failure type (H)    5. Type 2 diabetes mellitus with diabetic autonomic neuropathy, without long-term current use of insulin (H)    6. Anemia, unspecified type    7. Other ascites    8. Hypertension goal BP (blood pressure) < 140/90    9. S/p left hip fracture    10. Prostate cancer metastatic to bone (H)    11. Prostate cancer (H)    12. Metastasis to bone (H)    13. 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 Cirrhosis of liver with ascites, unspecified hepatic cirrhosis type (H)    15. Vitamin D deficiency disease    16. Gastroesophageal reflux disease, unspecified whether esophagitis present    17. Chronic diastolic " heart failure (H)      Discussed multiple issues  Went over med list in detail  Sent in refills  On omeprazole, not pantoprazole  Metoprolol dose now 200 mg daily  On spironolactone but prudent to stay off loop diuretic  One of main issues is chronic weakness given the last 6 months  He has home physical therapy  Try to get stronger  If needed could try lymphedema therapy  Encouraged protein intake  Prostate cancer treatment per onc  Lots of pain still, on 5 pain pills daily  Gave two week supply; do phone visit with me in two weeks   Check labs             Signed Electronically by: Richi Jaramillo MD

## 2024-12-11 NOTE — PATIENT INSTRUCTIONS
Increase walking/ activity as able    Physical therapy     We will send you lab results    Phone visit in two weeks

## 2024-12-11 NOTE — PROGRESS NOTES
12/9/24  Pt called SW and asked for help.  Pt said [facility] could only come once to help with lyphm wrapping.  He said he needs them more than that. SW said SW had told him that they can only come once. He can't bend down to get it.  Please help find another agency.     12/10/24  SW asked [facility]iaison if orders would work longer than just at discharge. She said they would.  She told me [facility] would do lyphn wrapping.     12/11/24  SW called [facility].  [phone] SW asked if the orders would still be good from last Friday's discharge.  Intake said yes, if he told other HH to stop.  They can't see anyone if HH is still working with him.  She gave SW fax number if SW wants to send referral.  [phone].     SW tried to leave a vm for pt.  Pt's vm was full.  SW sent an email asking him to call SW or email back stating wither pt canceled [facility].  Or not.  SW can send referral if pt lets SW know.     NICHOLE Thompson   [facility], Transitional Care Unit   Social Work   [facility]th [place] [address]  () [phone]

## 2024-12-12 DIAGNOSIS — C61 PROSTATE CANCER (H): ICD-10-CM

## 2024-12-12 DIAGNOSIS — C77.8 MALIGNANT NEOPLASM METASTATIC TO LYMPH NODES OF MULTIPLE SITES (H): Primary | ICD-10-CM

## 2024-12-12 DIAGNOSIS — C79.51 METASTASIS TO BONE (H): ICD-10-CM

## 2024-12-12 DIAGNOSIS — Z51.11 ENCOUNTER FOR ANTINEOPLASTIC CHEMOTHERAPY: ICD-10-CM

## 2024-12-12 LAB
ALBUMIN SERPL BCG-MCNC: 3 G/DL (ref 3.5–5.2)
ALP SERPL-CCNC: 176 U/L (ref 40–150)
ALT SERPL W P-5'-P-CCNC: 26 U/L (ref 0–70)
ANION GAP SERPL CALCULATED.3IONS-SCNC: 10 MMOL/L (ref 7–15)
AST SERPL W P-5'-P-CCNC: 41 U/L (ref 0–45)
BILIRUB SERPL-MCNC: 1.1 MG/DL
BUN SERPL-MCNC: 29.1 MG/DL (ref 8–23)
CALCIUM SERPL-MCNC: 8.7 MG/DL (ref 8.8–10.4)
CHLORIDE SERPL-SCNC: 110 MMOL/L (ref 98–107)
CREAT SERPL-MCNC: 1.3 MG/DL (ref 0.67–1.17)
EGFRCR SERPLBLD CKD-EPI 2021: 58 ML/MIN/1.73M2
GLUCOSE SERPL-MCNC: 100 MG/DL (ref 70–99)
HCO3 SERPL-SCNC: 23 MMOL/L (ref 22–29)
POTASSIUM SERPL-SCNC: 4.4 MMOL/L (ref 3.4–5.3)
PROT SERPL-MCNC: 5.3 G/DL (ref 6.4–8.3)
SODIUM SERPL-SCNC: 143 MMOL/L (ref 135–145)
VIT D+METAB SERPL-MCNC: 26 NG/ML (ref 20–50)

## 2024-12-12 NOTE — RESULT ENCOUNTER NOTE
Kidney function ( creatinine and GFR ) are stable.    Protein tests low but improving.    Red blood count ( hemoglobin ) is better.    Other tests are okay/ stable.    Do phone visit in a couple weeks as we discussed.    Richi Jaramillo MD      Please mail letter and results to patient   Thanks  Richi Jaramillo MD

## 2024-12-16 ENCOUNTER — OFFICE VISIT (OUTPATIENT)
Dept: INFUSION THERAPY | Facility: CLINIC | Age: 74
End: 2024-12-16
Attending: INTERNAL MEDICINE
Payer: COMMERCIAL

## 2024-12-16 ENCOUNTER — INFUSION THERAPY VISIT (OUTPATIENT)
Dept: ONCOLOGY | Facility: CLINIC | Age: 74
End: 2024-12-16
Attending: INTERNAL MEDICINE
Payer: COMMERCIAL

## 2024-12-16 ENCOUNTER — ANCILLARY PROCEDURE (OUTPATIENT)
Dept: ULTRASOUND IMAGING | Facility: CLINIC | Age: 74
End: 2024-12-16
Attending: STUDENT IN AN ORGANIZED HEALTH CARE EDUCATION/TRAINING PROGRAM
Payer: COMMERCIAL

## 2024-12-16 VITALS
BODY MASS INDEX: 37.15 KG/M2 | WEIGHT: 258.9 LBS | SYSTOLIC BLOOD PRESSURE: 116 MMHG | OXYGEN SATURATION: 96 % | RESPIRATION RATE: 20 BRPM | HEART RATE: 62 BPM | DIASTOLIC BLOOD PRESSURE: 62 MMHG

## 2024-12-16 VITALS — WEIGHT: 274.4 LBS | BODY MASS INDEX: 39.37 KG/M2

## 2024-12-16 DIAGNOSIS — C61 PROSTATE CANCER (H): Primary | ICD-10-CM

## 2024-12-16 DIAGNOSIS — C77.8 MALIGNANT NEOPLASM METASTATIC TO LYMPH NODES OF MULTIPLE SITES (H): ICD-10-CM

## 2024-12-16 DIAGNOSIS — C79.51 METASTASIS TO BONE (H): ICD-10-CM

## 2024-12-16 DIAGNOSIS — K74.60 CIRRHOSIS OF LIVER WITH ASCITES, UNSPECIFIED HEPATIC CIRRHOSIS TYPE (H): Primary | ICD-10-CM

## 2024-12-16 DIAGNOSIS — R18.8 CIRRHOSIS OF LIVER WITH ASCITES, UNSPECIFIED HEPATIC CIRRHOSIS TYPE (H): Primary | ICD-10-CM

## 2024-12-16 PROCEDURE — 250N000011 HC RX IP 250 OP 636: Mod: JZ | Performed by: INTERNAL MEDICINE

## 2024-12-16 PROCEDURE — 96372 THER/PROPH/DIAG INJ SC/IM: CPT | Performed by: INTERNAL MEDICINE

## 2024-12-16 PROCEDURE — 250N000009 HC RX 250: Performed by: STUDENT IN AN ORGANIZED HEALTH CARE EDUCATION/TRAINING PROGRAM

## 2024-12-16 PROCEDURE — 49083 ABD PARACENTESIS W/IMAGING: CPT

## 2024-12-16 PROCEDURE — 96372 THER/PROPH/DIAG INJ SC/IM: CPT | Performed by: STUDENT IN AN ORGANIZED HEALTH CARE EDUCATION/TRAINING PROGRAM

## 2024-12-16 PROCEDURE — 250N000011 HC RX IP 250 OP 636: Mod: JZ | Performed by: STUDENT IN AN ORGANIZED HEALTH CARE EDUCATION/TRAINING PROGRAM

## 2024-12-16 PROCEDURE — 49083 ABD PARACENTESIS W/IMAGING: CPT | Performed by: RADIOLOGY

## 2024-12-16 PROCEDURE — P9047 ALBUMIN (HUMAN), 25%, 50ML: HCPCS | Mod: JZ | Performed by: STUDENT IN AN ORGANIZED HEALTH CARE EDUCATION/TRAINING PROGRAM

## 2024-12-16 RX ORDER — EPINEPHRINE 1 MG/ML
0.3 INJECTION, SOLUTION, CONCENTRATE INTRAVENOUS EVERY 5 MIN PRN
OUTPATIENT
Start: 2025-01-15

## 2024-12-16 RX ORDER — DIPHENHYDRAMINE HYDROCHLORIDE 50 MG/ML
25 INJECTION INTRAMUSCULAR; INTRAVENOUS
Start: 2024-12-20

## 2024-12-16 RX ORDER — HEPARIN SODIUM,PORCINE 10 UNIT/ML
5-20 VIAL (ML) INTRAVENOUS DAILY PRN
OUTPATIENT
Start: 2024-12-20

## 2024-12-16 RX ORDER — EPINEPHRINE 1 MG/ML
0.3 INJECTION, SOLUTION INTRAMUSCULAR; SUBCUTANEOUS EVERY 5 MIN PRN
OUTPATIENT
Start: 2024-12-20

## 2024-12-16 RX ORDER — METHYLPREDNISOLONE SODIUM SUCCINATE 40 MG/ML
40 INJECTION INTRAMUSCULAR; INTRAVENOUS
Start: 2024-12-20

## 2024-12-16 RX ORDER — DIPHENHYDRAMINE HYDROCHLORIDE 50 MG/ML
50 INJECTION INTRAMUSCULAR; INTRAVENOUS
Start: 2025-01-15

## 2024-12-16 RX ORDER — ALBUTEROL SULFATE 90 UG/1
1-2 INHALANT RESPIRATORY (INHALATION)
Start: 2024-12-20

## 2024-12-16 RX ORDER — HEPARIN SODIUM (PORCINE) LOCK FLUSH IV SOLN 100 UNIT/ML 100 UNIT/ML
5 SOLUTION INTRAVENOUS
OUTPATIENT
Start: 2024-12-20

## 2024-12-16 RX ORDER — ALBUTEROL SULFATE 0.83 MG/ML
2.5 SOLUTION RESPIRATORY (INHALATION)
OUTPATIENT
Start: 2024-12-20

## 2024-12-16 RX ORDER — ALBUMIN (HUMAN) 12.5 G/50ML
12.5 SOLUTION INTRAVENOUS
OUTPATIENT
Start: 2024-12-20

## 2024-12-16 RX ORDER — DIPHENHYDRAMINE HYDROCHLORIDE 50 MG/ML
50 INJECTION INTRAMUSCULAR; INTRAVENOUS
Start: 2024-12-20

## 2024-12-16 RX ORDER — ALBUTEROL SULFATE 90 UG/1
1-2 INHALANT RESPIRATORY (INHALATION)
Start: 2025-01-15

## 2024-12-16 RX ORDER — ALBUTEROL SULFATE 0.83 MG/ML
2.5 SOLUTION RESPIRATORY (INHALATION)
OUTPATIENT
Start: 2025-01-15

## 2024-12-16 RX ORDER — ALBUMIN (HUMAN) 12.5 G/50ML
12.5 SOLUTION INTRAVENOUS
Status: DISCONTINUED | OUTPATIENT
Start: 2024-12-16 | End: 2024-12-16 | Stop reason: HOSPADM

## 2024-12-16 RX ORDER — LIDOCAINE HYDROCHLORIDE 10 MG/ML
20 INJECTION, SOLUTION EPIDURAL; INFILTRATION; INTRACAUDAL; PERINEURAL ONCE
OUTPATIENT
Start: 2024-12-20 | End: 2024-12-20

## 2024-12-16 RX ORDER — MEPERIDINE HYDROCHLORIDE 25 MG/ML
25 INJECTION INTRAMUSCULAR; INTRAVENOUS; SUBCUTANEOUS
OUTPATIENT
Start: 2025-01-15

## 2024-12-16 RX ORDER — DIPHENHYDRAMINE HYDROCHLORIDE 50 MG/ML
25 INJECTION INTRAMUSCULAR; INTRAVENOUS
Start: 2025-01-15

## 2024-12-16 RX ORDER — MEPERIDINE HYDROCHLORIDE 25 MG/ML
25 INJECTION INTRAMUSCULAR; INTRAVENOUS; SUBCUTANEOUS
OUTPATIENT
Start: 2024-12-20

## 2024-12-16 RX ORDER — HEPARIN SODIUM (PORCINE) LOCK FLUSH IV SOLN 100 UNIT/ML 100 UNIT/ML
5 SOLUTION INTRAVENOUS
Status: DISCONTINUED | OUTPATIENT
Start: 2024-12-16 | End: 2024-12-16 | Stop reason: HOSPADM

## 2024-12-16 RX ORDER — LIDOCAINE HYDROCHLORIDE 10 MG/ML
20 INJECTION, SOLUTION EPIDURAL; INFILTRATION; INTRACAUDAL; PERINEURAL ONCE
Status: COMPLETED | OUTPATIENT
Start: 2024-12-16 | End: 2024-12-16

## 2024-12-16 RX ORDER — METHYLPREDNISOLONE SODIUM SUCCINATE 40 MG/ML
40 INJECTION INTRAMUSCULAR; INTRAVENOUS
Start: 2025-01-15

## 2024-12-16 RX ADMIN — LIDOCAINE HYDROCHLORIDE 10 ML: 10 INJECTION, SOLUTION EPIDURAL; INFILTRATION; INTRACAUDAL; PERINEURAL at 11:54

## 2024-12-16 RX ADMIN — HEPARIN 5 ML: 100 SYRINGE at 12:30

## 2024-12-16 RX ADMIN — ALBUMIN HUMAN 12.5 G: 0.25 SOLUTION INTRAVENOUS at 12:09

## 2024-12-16 RX ADMIN — DENOSUMAB 120 MG: 120 INJECTION SUBCUTANEOUS at 11:15

## 2024-12-16 ASSESSMENT — PAIN SCALES - GENERAL: PAINLEVEL_OUTOF10: NO PAIN (0)

## 2024-12-16 NOTE — PROGRESS NOTES
Paracentesis Nursing Note  Gucci Logan [facility].    During today's appointment orders from Saumya Torres DO were completed.    Progress Note:  Patient identification verified by name and date of birth.  Assessment completed.  Vitals monitored throughout appointment and recorded in Doc Flowsheets.  See proceduralist note in ultrasound.    Vascular Access: port accessed today.  Labs: were not ordered for this appointment.    Date of consent or authorization: 11/22/24.  Invasive Procedure Safety Checklist was completed and sent for scanning.     Paracentesis performed by Mary Ceron MD.    The following labs were communicated to provider performing paracentesis:  Lab Results   Component Value Date    PLT 55 12/11/2024    PLT 63 04/05/2021       Total amount of ascites fluid drained: 5 liters.  Color of ascites fluid: yellow.  Total amount of albumin given: 12.5  grams.    Patient tolerated procedure well.    Post procedure,denies pain or discomfort post paracentesis.        Discharge Plan:  Discharge instructions were reviewed with patient.  Patient/Representative verbalized understanding and all questions were answered.   [facility] in stable condition.    Saumya Paredes RN      Pre: /68   Pulse 61   Resp 20   SpO2 96%  274lb 6.4oz (different scale and patient was wearing jacket)  Post: /62   Pulse 62   Resp 20   Wt 117.4 kg (258 lb 14.4 oz)   SpO2 96%   BMI 37.15 kg/m

## 2024-12-16 NOTE — LETTER
12/16/2024      Gucci Logan  [address] [facility] [address]      Dear Colleague,    Thank you for referring your patient, Gucci Logan, to the [facility] TREAT[facility]. Please see a copy of my visit note below.    Paracentesis Nursing Note  Gucci Logan presents [facility].    During today's appointment orders from Saumya Torres DO were completed.    Progress Note:  Patient identification verified by name and date of birth.  Assessment completed.  Vitals monitored throughout appointment and recorded in Doc Flowsheets.  See proceduralist note in ultrasound.    Vascular Access: port accessed today.  Labs: were not ordered for this appointment.    Date of consent or authorization: 11/22/24.  Invasive Procedure Safety Checklist was completed and sent for scanning.     Paracentesis performed by Mary Ceron MD.    The following labs were communicated to provider performing paracentesis:  Lab Results   Component Value Date    PLT 55 12/11/2024    PLT 63 04/05/2021       Total amount of ascites fluid drained: 5 liters.  Color of ascites fluid: yellow.  Total amount of albumin given: 12.5  grams.    Patient tolerated procedure well.    Post procedure,denies pain or discomfort post paracentesis.        Discharge Plan:  Discharge instructions were reviewed with patient.  Patient/Representative verbalized understanding and all questions were answered.   Discharged from [facility] in stable condition.    Saumya Paredes RN      Pre: /68   Pulse 61   Resp 20   SpO2 96%  274lb 6.4oz (different scale and patient was wearing jacket)  Post: /62   Pulse 62   Resp 20   Wt 117.4 kg (258 lb 14.4 oz)   SpO2 96%   BMI 37.15 kg/m          Again, thank you for allowing me to participate in the care of your patient.        Sincerely,        [facility] Paracentesis  Provider

## 2024-12-16 NOTE — PATIENT INSTRUCTIONS
Dear Gucci Logan    Thank you for choosing [facility] ([facility] for your paracentesis.  The following information is a summary of our appointment as well as important reminders.      If you have any questions on your upcoming [facility] appointments, please call scheduling at [phone].  It was a pleasure taking care of you today.    Sincerely,    [facility]  [facility]  [address] [place] [address]  [address]  Phone:  [phone]

## 2024-12-16 NOTE — PROGRESS NOTES
Infusion Injection Note:  Gucci Logan presents today for Xgeva injection.        Treatment Conditions:  Lab Results   Component Value Date    HGB 10.4 (L) 12/11/2024    WBC 4.3 12/11/2024    ANEU 2.1 08/21/2024    ANEUTAUTO 3.0 12/11/2024    PLT 55 (L) 12/11/2024        Lab Results   Component Value Date     12/11/2024    POTASSIUM 4.4 12/11/2024    MAG 1.6 (L) 12/02/2024    CR 1.30 (H) 12/11/2024    SOLEDAD 8.7 (L) 12/11/2024    BILITOTAL 1.1 12/11/2024    ALBUMIN 3.0 (L) 12/11/2024    ALT 26 12/11/2024    AST 41 12/11/2024       Results reviewed, labs MET treatment parameters, ok to proceed with treatment - Ca >/= 8.0 (labs drawn 12/11)    Pre and Post Injection:  Xgeva injection given to Left Arm without incident.   Patient tolerated procedure well.    Patient will be discharged by [facility] nurse after paracentesis.    Lorrie Moreno CA on 12/16/2024 at 11:51 AM

## 2024-12-18 ENCOUNTER — TELEPHONE (OUTPATIENT)
Dept: FAMILY MEDICINE | Facility: CLINIC | Age: 74
End: 2024-12-18
Payer: COMMERCIAL

## 2024-12-18 NOTE — TELEPHONE ENCOUNTER
Patient has been taking his metoprolol incorrectly since discharging, he thought he was taking 4 50 mg tablets to equal 200 mg but he was taking 4 100 mg tablets equalling 400mg. He denies any symptoms or side effects at this time. He only notice because he went to get a refill and it was too soon. He is wanting to know how to continue. Does he need to taper back down to 200mg? He took his BP while RN was on the phone and it was 126/64 with a HR of 66.    Thank you,  Shelbi Bruno RN

## 2024-12-19 NOTE — TELEPHONE ENCOUNTER
Left message on voicemail advising patient to return call at [phone].    Prema GREENN, RN  [facility] [place]

## 2024-12-19 NOTE — TELEPHONE ENCOUNTER
Patient calling back, I advised him of provider's advice regarding the metoprolol, he says he has plenty of metoprolol and can stay on the 200 mg dose.    Connie MCARTHUR RN  [facility] Triage

## 2024-12-23 ENCOUNTER — TELEPHONE (OUTPATIENT)
Dept: FAMILY MEDICINE | Facility: CLINIC | Age: 74
End: 2024-12-23

## 2024-12-23 ENCOUNTER — ANCILLARY PROCEDURE (OUTPATIENT)
Dept: ULTRASOUND IMAGING | Facility: CLINIC | Age: 74
End: 2024-12-23
Attending: STUDENT IN AN ORGANIZED HEALTH CARE EDUCATION/TRAINING PROGRAM
Payer: COMMERCIAL

## 2024-12-23 ENCOUNTER — OFFICE VISIT (OUTPATIENT)
Dept: INFUSION THERAPY | Facility: CLINIC | Age: 74
End: 2024-12-23
Attending: INTERNAL MEDICINE
Payer: COMMERCIAL

## 2024-12-23 VITALS
BODY MASS INDEX: 38.7 KG/M2 | OXYGEN SATURATION: 98 % | RESPIRATION RATE: 18 BRPM | TEMPERATURE: 98 F | SYSTOLIC BLOOD PRESSURE: 122 MMHG | WEIGHT: 269.7 LBS | DIASTOLIC BLOOD PRESSURE: 73 MMHG | HEART RATE: 67 BPM

## 2024-12-23 DIAGNOSIS — K74.60 CIRRHOSIS OF LIVER WITH ASCITES, UNSPECIFIED HEPATIC CIRRHOSIS TYPE (H): Primary | ICD-10-CM

## 2024-12-23 DIAGNOSIS — R18.8 CIRRHOSIS OF LIVER WITH ASCITES, UNSPECIFIED HEPATIC CIRRHOSIS TYPE (H): Primary | ICD-10-CM

## 2024-12-23 DIAGNOSIS — R18.8 OTHER ASCITES: ICD-10-CM

## 2024-12-23 DIAGNOSIS — R18.8 OTHER ASCITES: Primary | ICD-10-CM

## 2024-12-23 LAB
ALBUMIN SERPL BCG-MCNC: 2.8 G/DL (ref 3.5–5.2)
ALP SERPL-CCNC: 158 U/L (ref 40–150)
ALT SERPL W P-5'-P-CCNC: 26 U/L (ref 0–70)
ANION GAP SERPL CALCULATED.3IONS-SCNC: 8 MMOL/L (ref 7–15)
AST SERPL W P-5'-P-CCNC: 38 U/L (ref 0–45)
BASOPHILS # BLD AUTO: 0 10E3/UL (ref 0–0.2)
BASOPHILS NFR BLD AUTO: 0 %
BILIRUB SERPL-MCNC: 1 MG/DL
BUN SERPL-MCNC: 23.8 MG/DL (ref 8–23)
CALCIUM SERPL-MCNC: 6.5 MG/DL (ref 8.8–10.4)
CHLORIDE SERPL-SCNC: 112 MMOL/L (ref 98–107)
CREAT SERPL-MCNC: 1.16 MG/DL (ref 0.67–1.17)
EGFRCR SERPLBLD CKD-EPI 2021: 67 ML/MIN/1.73M2
EOSINOPHIL # BLD AUTO: 0.1 10E3/UL (ref 0–0.7)
EOSINOPHIL NFR BLD AUTO: 2 %
ERYTHROCYTE [DISTWIDTH] IN BLOOD BY AUTOMATED COUNT: 15.9 % (ref 10–15)
GLUCOSE SERPL-MCNC: 193 MG/DL (ref 70–99)
HCO3 SERPL-SCNC: 22 MMOL/L (ref 22–29)
HCT VFR BLD AUTO: 30.4 % (ref 40–53)
HGB BLD-MCNC: 9.8 G/DL (ref 13.3–17.7)
IMM GRANULOCYTES # BLD: 0 10E3/UL
IMM GRANULOCYTES NFR BLD: 0 %
LYMPHOCYTES # BLD AUTO: 0.6 10E3/UL (ref 0.8–5.3)
LYMPHOCYTES NFR BLD AUTO: 14 %
MCH RBC QN AUTO: 33.9 PG (ref 26.5–33)
MCHC RBC AUTO-ENTMCNC: 32.2 G/DL (ref 31.5–36.5)
MCV RBC AUTO: 105 FL (ref 78–100)
MONOCYTES # BLD AUTO: 0.3 10E3/UL (ref 0–1.3)
MONOCYTES NFR BLD AUTO: 7 %
NEUTROPHILS # BLD AUTO: 3.1 10E3/UL (ref 1.6–8.3)
NEUTROPHILS NFR BLD AUTO: 76 %
NRBC # BLD AUTO: 0 10E3/UL
NRBC BLD AUTO-RTO: 0 /100
PLATELET # BLD AUTO: 67 10E3/UL (ref 150–450)
POTASSIUM SERPL-SCNC: 5.2 MMOL/L (ref 3.4–5.3)
PROT SERPL-MCNC: 5 G/DL (ref 6.4–8.3)
RBC # BLD AUTO: 2.89 10E6/UL (ref 4.4–5.9)
SODIUM SERPL-SCNC: 142 MMOL/L (ref 135–145)
WBC # BLD AUTO: 4.1 10E3/UL (ref 4–11)

## 2024-12-23 PROCEDURE — 49083 ABD PARACENTESIS W/IMAGING: CPT

## 2024-12-23 PROCEDURE — 82247 BILIRUBIN TOTAL: CPT

## 2024-12-23 PROCEDURE — 96372 THER/PROPH/DIAG INJ SC/IM: CPT | Performed by: STUDENT IN AN ORGANIZED HEALTH CARE EDUCATION/TRAINING PROGRAM

## 2024-12-23 PROCEDURE — P9047 ALBUMIN (HUMAN), 25%, 50ML: HCPCS | Mod: JZ | Performed by: STUDENT IN AN ORGANIZED HEALTH CARE EDUCATION/TRAINING PROGRAM

## 2024-12-23 PROCEDURE — 250N000009 HC RX 250: Performed by: STUDENT IN AN ORGANIZED HEALTH CARE EDUCATION/TRAINING PROGRAM

## 2024-12-23 PROCEDURE — 250N000011 HC RX IP 250 OP 636: Performed by: STUDENT IN AN ORGANIZED HEALTH CARE EDUCATION/TRAINING PROGRAM

## 2024-12-23 PROCEDURE — 80053 COMPREHEN METABOLIC PANEL: CPT

## 2024-12-23 PROCEDURE — 82947 ASSAY GLUCOSE BLOOD QUANT: CPT

## 2024-12-23 PROCEDURE — 36415 COLL VENOUS BLD VENIPUNCTURE: CPT

## 2024-12-23 PROCEDURE — 85004 AUTOMATED DIFF WBC COUNT: CPT

## 2024-12-23 PROCEDURE — 49083 ABD PARACENTESIS W/IMAGING: CPT | Performed by: PHYSICIAN ASSISTANT

## 2024-12-23 RX ORDER — LIDOCAINE HYDROCHLORIDE 10 MG/ML
20 INJECTION, SOLUTION EPIDURAL; INFILTRATION; INTRACAUDAL; PERINEURAL ONCE
Status: COMPLETED | OUTPATIENT
Start: 2024-12-23 | End: 2024-12-23

## 2024-12-23 RX ORDER — DIPHENHYDRAMINE HYDROCHLORIDE 50 MG/ML
25 INJECTION INTRAMUSCULAR; INTRAVENOUS
Start: 2024-12-27

## 2024-12-23 RX ORDER — HEPARIN SODIUM (PORCINE) LOCK FLUSH IV SOLN 100 UNIT/ML 100 UNIT/ML
5 SOLUTION INTRAVENOUS
Status: DISCONTINUED | OUTPATIENT
Start: 2024-12-23 | End: 2024-12-23 | Stop reason: HOSPADM

## 2024-12-23 RX ORDER — MEPERIDINE HYDROCHLORIDE 25 MG/ML
25 INJECTION INTRAMUSCULAR; INTRAVENOUS; SUBCUTANEOUS
OUTPATIENT
Start: 2024-12-27

## 2024-12-23 RX ORDER — ALBUTEROL SULFATE 0.83 MG/ML
2.5 SOLUTION RESPIRATORY (INHALATION)
OUTPATIENT
Start: 2024-12-27

## 2024-12-23 RX ORDER — ALBUTEROL SULFATE 90 UG/1
1-2 INHALANT RESPIRATORY (INHALATION)
Start: 2024-12-27

## 2024-12-23 RX ORDER — HEPARIN SODIUM,PORCINE 10 UNIT/ML
5-20 VIAL (ML) INTRAVENOUS DAILY PRN
OUTPATIENT
Start: 2024-12-27

## 2024-12-23 RX ORDER — EPINEPHRINE 1 MG/ML
0.3 INJECTION, SOLUTION INTRAMUSCULAR; SUBCUTANEOUS EVERY 5 MIN PRN
OUTPATIENT
Start: 2024-12-27

## 2024-12-23 RX ORDER — ALBUMIN (HUMAN) 12.5 G/50ML
12.5 SOLUTION INTRAVENOUS
OUTPATIENT
Start: 2024-12-27

## 2024-12-23 RX ORDER — METHYLPREDNISOLONE SODIUM SUCCINATE 40 MG/ML
40 INJECTION INTRAMUSCULAR; INTRAVENOUS
Start: 2024-12-27

## 2024-12-23 RX ORDER — HEPARIN SODIUM (PORCINE) LOCK FLUSH IV SOLN 100 UNIT/ML 100 UNIT/ML
5 SOLUTION INTRAVENOUS
OUTPATIENT
Start: 2024-12-27

## 2024-12-23 RX ORDER — LIDOCAINE HYDROCHLORIDE 10 MG/ML
20 INJECTION, SOLUTION EPIDURAL; INFILTRATION; INTRACAUDAL; PERINEURAL ONCE
OUTPATIENT
Start: 2024-12-27 | End: 2024-12-27

## 2024-12-23 RX ORDER — ALBUMIN (HUMAN) 12.5 G/50ML
12.5 SOLUTION INTRAVENOUS
Status: DISCONTINUED | OUTPATIENT
Start: 2024-12-23 | End: 2024-12-23 | Stop reason: HOSPADM

## 2024-12-23 RX ORDER — DIPHENHYDRAMINE HYDROCHLORIDE 50 MG/ML
50 INJECTION INTRAMUSCULAR; INTRAVENOUS
Start: 2024-12-27

## 2024-12-23 RX ADMIN — ALBUMIN HUMAN 12.5 G: 0.25 SOLUTION INTRAVENOUS at 14:44

## 2024-12-23 RX ADMIN — LIDOCAINE HYDROCHLORIDE 15 ML: 10 INJECTION, SOLUTION EPIDURAL; INFILTRATION; INTRACAUDAL; PERINEURAL at 14:05

## 2024-12-23 RX ADMIN — HEPARIN 5 ML: 100 SYRINGE at 15:15

## 2024-12-23 RX ADMIN — ALBUMIN HUMAN 12.5 G: 0.25 SOLUTION INTRAVENOUS at 14:33

## 2024-12-23 RX ADMIN — ALBUMIN HUMAN 12.5 G: 0.25 SOLUTION INTRAVENOUS at 14:53

## 2024-12-23 NOTE — PROGRESS NOTES
Interventional Radiology Brief Post Procedure Note    Procedure: IR paracentesis    Proceduralist: Viji Shepherd PA-C    Assistant: None    Time Out: Prior to the start of the procedure and with procedural staff participation, I verbally confirmed the patient s identity using two indicators, relevant allergies, that the procedure was appropriate and matched the consent or emergent situation, and that the correct equipment/implants were available. Immediately prior to starting the procedure I conducted the Time Out with the procedural staff and re-confirmed the patient s name, procedure, and site/side. (The Joint Commission universal protocol was followed.)  Yes        Sedation: None. Local Anesthestic used    Findings: Image guided paracentesis, 6.3 L removed from LLQ    Estimated Blood Loss: Minimal    Fluoroscopy Time:  0 minute(s)    SPECIMENS: None    Complications: 1. None     Condition: Stable    Plan: Resume prior cares.     Comments: See dictated procedure note for full details.    Viji Shepherd PA-C

## 2024-12-23 NOTE — PATIENT INSTRUCTIONS
DISCHARGE INSTRUCTIONS FOLLOWING ABDOMINAL PARACENTESIS    After you go home:  No strenuous activity for 24 hours  Resume your regular diet  Limit fluid intake for the first 48 hours to no more than 2 quarts per day.  There should be minimal drainage from the needle site.  If drainage does occur and soaks through the bandage, apply gentle pressure with your hand for 5 minutes.    Notify MD for the following:  Excessive drainage  Excessive swelling, redness or tenderness at the needle site  Fever greater than 101 degrees F  Dizziness or light-headedness when getting up or walking      IF THIS IS A MEDICAL EMERGENCY, CALL [phone]    If you have any questions or concerns    Contact the Hepatology Clinic:   [phone]    If you are a post-transplant patient, contact the Transplant Office:  [phone]    If this is after hours, contact the hospital :  [phone] and as to have the GI resident on call paged                 Dear Gucci Logan    Thank you for choosing [facility] ([facility]) for your paracentesis.  The following information is a summary of our appointment as well as important reminders.      We look forward in seeing you on your next appointment here at [facility] ([facility]).  Please don t hesitate to call us at [phone] to reschedule any of your appointments or to speak with one of the [facility] registered nurses.  It was a pleasure taking care of you today.    Sincerely,    [facility]  [facility]  [address] [place] [address]  [address]  Phone:  [phone]

## 2024-12-23 NOTE — LETTER
12/23/2024      Gucci Logan  [address] [facility] [address]      Dear Colleague,    Thank you for referring your patient, Gucci Logan, to the [facility] TREATMENT [facility]. Please see a copy of my visit note below.    Paracentesis Nursing Note  Gucci Logan [facility].    During today's appointment orders from Mata Renteria MD were completed.    Progress Note:  Patient identification verified by name and date of birth.  Assessment completed.  Vitals monitored throughout appointment and recorded in Doc Flowsheets.  See proceduralist note in ultrasound.    Vascular Access: port accessed today.  Labs: were drawn per orders.     Date of consent or authorization: 11/22/2024.  Invasive Procedure Safety Checklist was completed and sent for scanning.     Paracentesis performed by Viji Shepherd PA-C.    The following labs were communicated to provider performing paracentesis:  Lab Results   Component Value Date    PLT 67 12/23/2024    PLT 63 04/05/2021       Total amount of ascites fluid drained: 6.3 liters. Patient reached 5L drain limit, ultrasound showed remaining fluid, and patient endorsed still feeling full.  messaged and received verbal order from to change patient's drain limit to 8L for today's paracentesis only.   Color of ascites fluid: straw colored.  Total amount of albumin given: 37.5  grams.    Patient tolerated procedure well.    Post procedure,denies pain or discomfort post paracentesis.    Discharge Plan:  Discharge instructions were reviewed with patient.  Patient/Representative verbalized understanding and all questions were answered.   [facility] in stable condition.    Bryanna Choi RN    Administrations This Visit       albumin human 25 % injection 12.5 g       Admin Date  12/23/2024 Action  $New Bag Dose  12.5 g  Route  Intravenous Documented By  Bryanna Perez RN               Admin Date  12/23/2024 Action  $New Bag Dose  12.5 g Route  Intravenous Documented By  Bryanna Perez RN               Admin Date  12/23/2024 Action  $New Bag Dose  12.5 g Route  Intravenous Documented By  Bryanna Perez RN              heparin lock flush 100 unit/mL injection 5 mL       Admin Date  12/23/2024 Action  $Given Dose  5 mL Route  Intracatheter Documented By  Bryanna Perez RN              lidocaine (PF) (XYLOCAINE) 1 % injection 20 mL       Admin Date  12/23/2024 Action  $Given by Other Dose  15 mL Route  Subcutaneous Documented By  Bryanna Perez RN                  /73   Pulse 67   Temp 98  F (36.7  C) (Oral)   Resp 18   Wt 122.3 kg (269 lb 11.2 oz)   SpO2 98%   BMI 38.70 kg/m        Interventional Radiology Brief Post Procedure Note    Procedure: IR paracentesis    Proceduralist: Viji Shepherd PA-C    Assistant: None    Time Out: Prior to the start of the procedure and with procedural staff participation, I verbally confirmed the patient s identity using two indicators, relevant allergies, that the procedure was appropriate and matched the consent or emergent situation, and that the correct equipment/implants were available. Immediately prior to starting the procedure I conducted the Time Out with the procedural staff and re-confirmed the patient s name, procedure, and site/side. (The Joint Commission universal protocol was followed.)  Yes        Sedation: None. Local Anesthestic used    Findings: Image guided paracentesis, 6.3 L removed from LLQ    Estimated Blood Loss: Minimal    Fluoroscopy Time:  0 minute(s)    SPECIMENS: None    Complications: 1. None     Condition: Stable    Plan: Resume prior cares.     Comments: See dictated procedure note for full details.    Viji Shepherd PA-C          Again, thank you for allowing me to participate in the care of your patient.         Sincerely,        [facility] Paracentesis Provider    Electronically signed

## 2024-12-23 NOTE — PROGRESS NOTES
Paracentesis Nursing Note  Gucci Logan presents [facility] for [facility].    During today's appointment orders from Mata Renteria MD were completed.    Progress Note:  Patient identification verified by name and date of birth.  Assessment completed.  Vitals monitored throughout appointment and recorded in Doc Flowsheets.  See proceduralist note in ultrasound.    Vascular Access: port accessed today.  Labs: were drawn per orders.     Date of consent or authorization: 11/22/2024.  Invasive Procedure Safety Checklist was completed and sent for scanning.     Paracentesis performed by Viji Shepherd PA-C.    The following labs were communicated to provider performing paracentesis:  Lab Results   Component Value Date    PLT 67 12/23/2024    PLT 63 04/05/2021       Total amount of ascites fluid drained: 6.3 liters. Patient reached 5L drain limit, ultrasound showed remaining fluid, and patient endorsed still feeling full.  messaged and received verbal order from to change patient's drain limit to 8L for today's paracentesis only.   Color of ascites fluid: straw colored.  Total amount of albumin given: 37.5  grams.    Patient tolerated procedure well.    Post procedure,denies pain or discomfort post paracentesis.    Discharge Plan:  Discharge instructions were reviewed with patient.  Patient/Representative verbalized understanding and all questions were answered.   [facility] in stable condition.    Bryanna Perez RN    Administrations This Visit       albumin human 25 % injection 12.5 g       Admin Date  12/23/2024 Action  $New Bag Dose  12.5 g Route  Intravenous Documented By  Bryanna Perez RN               Admin Date  12/23/2024 Action  $New Bag Dose  12.5 g Route  Intravenous Documented By  Bryanna Perez RN               Admin Date  12/23/2024 Action  $New Bag Dose  12.5 g Route  Intravenous Documented  By  Bryanna Perez RN              heparin lock flush 100 unit/mL injection 5 mL       Admin Date  12/23/2024 Action  $Given Dose  5 mL Route  Intracatheter Documented By  Bryanna Perez RN              lidocaine (PF) (XYLOCAINE) 1 % injection 20 mL       Admin Date  12/23/2024 Action  $Given by Other Dose  15 mL Route  Subcutaneous Documented By  Bryanna Perez RN                  /73   Pulse 67   Temp 98  F (36.7  C) (Oral)   Resp 18   Wt 122.3 kg (269 lb 11.2 oz)   SpO2 98%   BMI 38.70 kg/m

## 2024-12-24 NOTE — RESULT ENCOUNTER NOTE
I just tried to call with results.    Hemoglobin ( red blood count ) low but fairly stable.    Kidney tests ( creatinine and GFR ) are better.    Calcium is quite low.  This is concerning.  It should be rechecked next time.    Liver tests and platelets stable.    Richi Jaramilol MD      Please mail letter and results to patient   Thanks  Richi Jaramillo MD

## 2024-12-27 ENCOUNTER — TELEPHONE (OUTPATIENT)
Dept: FAMILY MEDICINE | Facility: CLINIC | Age: 74
End: 2024-12-27
Payer: COMMERCIAL

## 2024-12-27 NOTE — TELEPHONE ENCOUNTER
Home Care is calling regarding an established patient with [facility].       Requesting orders from: Richi Jaramillo  Provider is following patient: Yes  Is this a 60-day recertification request?  No    Orders Requested    HHA (Home Health Aide)  Request for initial certification (first set of orders)   Frequency:  1 x per week for bathing     Information was gathered and will be sent to provider for review.  RN will contact Home Care with information after provider review.  Confirmed ok to leave a detailed message with call back.  Contact information confirmed and updated as needed.    Sigifredo Hinds RN

## 2024-12-30 ENCOUNTER — ANCILLARY PROCEDURE (OUTPATIENT)
Dept: ULTRASOUND IMAGING | Facility: CLINIC | Age: 74
End: 2024-12-30
Attending: STUDENT IN AN ORGANIZED HEALTH CARE EDUCATION/TRAINING PROGRAM
Payer: COMMERCIAL

## 2024-12-30 ENCOUNTER — OFFICE VISIT (OUTPATIENT)
Dept: INFUSION THERAPY | Facility: CLINIC | Age: 74
End: 2024-12-30
Attending: INTERNAL MEDICINE
Payer: COMMERCIAL

## 2024-12-30 VITALS
TEMPERATURE: 98.1 F | DIASTOLIC BLOOD PRESSURE: 62 MMHG | SYSTOLIC BLOOD PRESSURE: 114 MMHG | RESPIRATION RATE: 18 BRPM | OXYGEN SATURATION: 98 % | HEART RATE: 71 BPM

## 2024-12-30 DIAGNOSIS — R18.8 CIRRHOSIS OF LIVER WITH ASCITES, UNSPECIFIED HEPATIC CIRRHOSIS TYPE (H): Primary | ICD-10-CM

## 2024-12-30 DIAGNOSIS — C61 PROSTATE CANCER (H): ICD-10-CM

## 2024-12-30 DIAGNOSIS — C77.8 MALIGNANT NEOPLASM METASTATIC TO LYMPH NODES OF MULTIPLE SITES (H): ICD-10-CM

## 2024-12-30 DIAGNOSIS — K74.60 CIRRHOSIS OF LIVER WITH ASCITES, UNSPECIFIED HEPATIC CIRRHOSIS TYPE (H): Primary | ICD-10-CM

## 2024-12-30 DIAGNOSIS — C79.51 METASTASIS TO BONE (H): ICD-10-CM

## 2024-12-30 LAB
ALBUMIN SERPL BCG-MCNC: 2.8 G/DL (ref 3.5–5.2)
ALP SERPL-CCNC: 130 U/L (ref 40–150)
ALT SERPL W P-5'-P-CCNC: 18 U/L (ref 0–70)
ANION GAP SERPL CALCULATED.3IONS-SCNC: 7 MMOL/L (ref 7–15)
AST SERPL W P-5'-P-CCNC: 26 U/L (ref 0–45)
BILIRUB SERPL-MCNC: 0.9 MG/DL
BUN SERPL-MCNC: 28.1 MG/DL (ref 8–23)
CALCIUM SERPL-MCNC: 7 MG/DL (ref 8.8–10.4)
CHLORIDE SERPL-SCNC: 113 MMOL/L (ref 98–107)
CREAT SERPL-MCNC: 1.31 MG/DL (ref 0.67–1.17)
EGFRCR SERPLBLD CKD-EPI 2021: 57 ML/MIN/1.73M2
GLUCOSE SERPL-MCNC: 119 MG/DL (ref 70–99)
HCO3 SERPL-SCNC: 23 MMOL/L (ref 22–29)
POTASSIUM SERPL-SCNC: 5.3 MMOL/L (ref 3.4–5.3)
PROT SERPL-MCNC: 5.1 G/DL (ref 6.4–8.3)
SODIUM SERPL-SCNC: 143 MMOL/L (ref 135–145)

## 2024-12-30 PROCEDURE — 36415 COLL VENOUS BLD VENIPUNCTURE: CPT | Performed by: INTERNAL MEDICINE

## 2024-12-30 PROCEDURE — 82565 ASSAY OF CREATININE: CPT | Performed by: INTERNAL MEDICINE

## 2024-12-30 PROCEDURE — 49083 ABD PARACENTESIS W/IMAGING: CPT | Mod: GC | Performed by: RADIOLOGY

## 2024-12-30 PROCEDURE — 250N000009 HC RX 250: Performed by: STUDENT IN AN ORGANIZED HEALTH CARE EDUCATION/TRAINING PROGRAM

## 2024-12-30 PROCEDURE — 96372 THER/PROPH/DIAG INJ SC/IM: CPT | Performed by: STUDENT IN AN ORGANIZED HEALTH CARE EDUCATION/TRAINING PROGRAM

## 2024-12-30 PROCEDURE — P9047 ALBUMIN (HUMAN), 25%, 50ML: HCPCS | Mod: JZ | Performed by: STUDENT IN AN ORGANIZED HEALTH CARE EDUCATION/TRAINING PROGRAM

## 2024-12-30 PROCEDURE — 250N000011 HC RX IP 250 OP 636: Mod: JZ | Performed by: STUDENT IN AN ORGANIZED HEALTH CARE EDUCATION/TRAINING PROGRAM

## 2024-12-30 PROCEDURE — 49083 ABD PARACENTESIS W/IMAGING: CPT

## 2024-12-30 PROCEDURE — 82310 ASSAY OF CALCIUM: CPT | Performed by: INTERNAL MEDICINE

## 2024-12-30 RX ORDER — LIDOCAINE HYDROCHLORIDE 10 MG/ML
20 INJECTION, SOLUTION EPIDURAL; INFILTRATION; INTRACAUDAL; PERINEURAL ONCE
OUTPATIENT
Start: 2025-01-03 | End: 2025-01-03

## 2024-12-30 RX ORDER — MEPERIDINE HYDROCHLORIDE 25 MG/ML
25 INJECTION INTRAMUSCULAR; INTRAVENOUS; SUBCUTANEOUS
OUTPATIENT
Start: 2025-01-03

## 2024-12-30 RX ORDER — HEPARIN SODIUM (PORCINE) LOCK FLUSH IV SOLN 100 UNIT/ML 100 UNIT/ML
5 SOLUTION INTRAVENOUS
OUTPATIENT
Start: 2025-01-03

## 2024-12-30 RX ORDER — DIPHENHYDRAMINE HYDROCHLORIDE 50 MG/ML
50 INJECTION INTRAMUSCULAR; INTRAVENOUS
Start: 2025-01-03

## 2024-12-30 RX ORDER — ALBUTEROL SULFATE 0.83 MG/ML
2.5 SOLUTION RESPIRATORY (INHALATION)
OUTPATIENT
Start: 2025-01-03

## 2024-12-30 RX ORDER — ALBUMIN (HUMAN) 12.5 G/50ML
12.5 SOLUTION INTRAVENOUS
OUTPATIENT
Start: 2025-01-03

## 2024-12-30 RX ORDER — ALBUTEROL SULFATE 90 UG/1
1-2 INHALANT RESPIRATORY (INHALATION)
Start: 2025-01-03

## 2024-12-30 RX ORDER — DIPHENHYDRAMINE HYDROCHLORIDE 50 MG/ML
25 INJECTION INTRAMUSCULAR; INTRAVENOUS
Start: 2025-01-03

## 2024-12-30 RX ORDER — METHYLPREDNISOLONE SODIUM SUCCINATE 40 MG/ML
40 INJECTION INTRAMUSCULAR; INTRAVENOUS
Start: 2025-01-03

## 2024-12-30 RX ORDER — LIDOCAINE HYDROCHLORIDE 10 MG/ML
20 INJECTION, SOLUTION EPIDURAL; INFILTRATION; INTRACAUDAL; PERINEURAL ONCE
Status: COMPLETED | OUTPATIENT
Start: 2024-12-30 | End: 2024-12-30

## 2024-12-30 RX ORDER — ALBUMIN (HUMAN) 12.5 G/50ML
12.5 SOLUTION INTRAVENOUS
Status: DISCONTINUED | OUTPATIENT
Start: 2024-12-30 | End: 2024-12-30 | Stop reason: HOSPADM

## 2024-12-30 RX ORDER — EPINEPHRINE 1 MG/ML
0.3 INJECTION, SOLUTION INTRAMUSCULAR; SUBCUTANEOUS EVERY 5 MIN PRN
OUTPATIENT
Start: 2025-01-03

## 2024-12-30 RX ORDER — HEPARIN SODIUM (PORCINE) LOCK FLUSH IV SOLN 100 UNIT/ML 100 UNIT/ML
5 SOLUTION INTRAVENOUS
Status: DISCONTINUED | OUTPATIENT
Start: 2024-12-30 | End: 2024-12-30 | Stop reason: HOSPADM

## 2024-12-30 RX ORDER — HEPARIN SODIUM,PORCINE 10 UNIT/ML
5-20 VIAL (ML) INTRAVENOUS DAILY PRN
OUTPATIENT
Start: 2025-01-03

## 2024-12-30 RX ADMIN — ALBUMIN HUMAN 12.5 G: 0.25 SOLUTION INTRAVENOUS at 14:14

## 2024-12-30 RX ADMIN — ALBUMIN HUMAN 12.5 G: 0.25 SOLUTION INTRAVENOUS at 14:25

## 2024-12-30 RX ADMIN — LIDOCAINE HYDROCHLORIDE 10 ML: 10 INJECTION, SOLUTION EPIDURAL; INFILTRATION; INTRACAUDAL; PERINEURAL at 14:21

## 2024-12-30 ASSESSMENT — PAIN SCALES - GENERAL: PAINLEVEL_OUTOF10: SEVERE PAIN (7)

## 2024-12-30 NOTE — LETTER
December 31, 2024      Canelo Logan  [address]  [facility] [address]        Dear ,    We are writing to inform you of your test results.    Your test results fall within the expected range(s) or remain unchanged from previous results.  Please continue with current treatment plan.    Resulted Orders   Comprehensive metabolic panel   Result Value Ref Range    Sodium 143 135 - 145 mmol/L    Potassium 5.3 3.4 - 5.3 mmol/L    Carbon Dioxide (CO2) 23 22 - 29 mmol/L    Anion Gap 7 7 - 15 mmol/L    Urea Nitrogen 28.1 (H) 8.0 - 23.0 mg/dL    Creatinine 1.31 (H) 0.67 - 1.17 mg/dL    GFR Estimate 57 (L) >60 mL/min/1.73m2      Comment:      eGFR calculated using 2021 CKD-EPI equation.    Calcium 7.0 (L) 8.8 - 10.4 mg/dL      Comment:      Reference intervals for this test were updated on 7/16/2024 to reflect our healthy population more accurately. There may be differences in the flagging of prior results with similar values performed with this method. Those prior results can be interpreted in the context of the updated reference intervals.    Chloride 113 (H) 98 - 107 mmol/L    Glucose 119 (H) 70 - 99 mg/dL    Alkaline Phosphatase 130 40 - 150 U/L    AST 26 0 - 45 U/L    ALT 18 0 - 70 U/L    Protein Total 5.1 (L) 6.4 - 8.3 g/dL    Albumin 2.8 (L) 3.5 - 5.2 g/dL    Bilirubin Total 0.9 <=1.2 mg/dL           If you have any questions or concerns, please call the clinic at the number listed above.     Sincerely,    Mata Renteria MD    Electronically signed

## 2024-12-30 NOTE — LETTER
12/30/2024      Gucci Logan  [address] [facility] [address]      Dear Colleague,    Thank you for referring your patient, Gucci Logan, to the [facility]. Please see a copy of my visit note below.    Paracentesis Nursing Note  Gucci Logan [facility].    During today's appointment orders from Mata Renteria MD were completed.    Progress Note:  Patient identification verified by name and date of birth.  Assessment completed.  Vitals monitored throughout appointment and recorded in Doc Flowsheets.  See proceduralist note in ultrasound.    Vascular Access: port accessed today.  Labs: were drawn per orders. CMP drawn per FANTASMA Renteria    Date of consent or authorization: 11/22/2024.  Invasive Procedure Safety Checklist was completed and sent for scanning.     Paracentesis performed by Viji Shepherd PA-C.    The following labs were communicated to provider performing paracentesis:  Lab Results   Component Value Date    PLT 67 12/23/2024    PLT 63 04/05/2021       Total amount of ascites fluid drained: 5.7 liters. Max drain limit increased to 6L going forward.  Color of ascites fluid: straw colored.  Total amount of albumin given: 25  grams.    Patient tolerated procedure well.    Post procedure,denies pain or discomfort post paracentesis.    Discharge Plan:  Discharge instructions were reviewed with patient.  Patient/Representative verbalized understanding and all questions were answered.   Discharged [facility] in stable condition.    Elyssa Wei RN      Pre  BP (!) 151/76   Pulse 72   Temp 98.1  F (36.7  C) (Oral)   Resp 18   SpO2 98%     Post  /67 (BP Location: Right arm, Patient Position: Semi-Story's, Cuff Size: Adult Regular)   Pulse 78   Temp 98.1  F (36.7  C) (Oral)   Resp 18   SpO2 98%         Again, thank you for allowing me  I have reviewed discharge instructions with the patient. The patient verbalized understanding. Given instructions. Provider gave scripts. Ambulatory at discharge.  Discharge via wheelchair for safety to participate in the care of your patient.        Sincerely,        [facility] Paracentesis Provider    Electronically signed

## 2024-12-30 NOTE — PATIENT INSTRUCTIONS
Dear Gucci Logan    Thank you for choosing [facility] for your [facility]acentesis.  The following information is a summary of our appointment as well as important reminders.      If you are a transplant patient and require transplant education, please click on this link: [email or web address].    If you have any questions on your upcoming [facility] appointments, please call scheduling at [phone].  It was a pleasure taking care of you today.    Sincerely,    [facility]  [address] [place] [address]  [id number]  Phone:  [phone]

## 2024-12-30 NOTE — PROGRESS NOTES
Paracentesis Nursing Note  Gucci Logan presents today to [facility] for [facility].    During today's appointment orders from Mata Renteria MD were completed.    Progress Note:  Patient identification verified by name and date of birth.  Assessment completed.  Vitals monitored throughout appointment and recorded in Doc Flowsheets.  See proceduralist note in ultrasound.    Vascular Access: port accessed today.  Labs: were drawn per orders. CMP drawn per FANTASMA Renteria    Date of consent or authorization: 11/22/2024.  Invasive Procedure Safety Checklist was completed and sent for scanning.     Paracentesis performed by Viji Shepherd PA-C.    The following labs were communicated to provider performing paracentesis:  Lab Results   Component Value Date    PLT 67 12/23/2024    PLT 63 04/05/2021       Total amount of ascites fluid drained: 5.7 liters. Max drain limit increased to 6L going forward.  Color of ascites fluid: straw colored.  Total amount of albumin given: 25  grams.    Patient tolerated procedure well.    Post procedure,denies pain or discomfort post paracentesis.    Discharge Plan:  Discharge instructions were reviewed with patient.  Patient/Representative verbalized understanding and all questions were answered.   [facility] in stable condition.    Elyssa Wei RN      Pre  BP (!) 151/76   Pulse 72   Temp 98.1  F (36.7  C) (Oral)   Resp 18   SpO2 98%     Post  /67 (BP Location: Right arm, Patient Position: Semi-Story's, Cuff Size: Adult Regular)   Pulse 78   Temp 98.1  F (36.7  C) (Oral)   Resp 18   SpO2 98%

## 2024-12-31 NOTE — TELEPHONE ENCOUNTER
Called [facility]. Left detailed voice message on identified voicemail box regarding home care orders, advised to return call at [phone] for any further questions.     PETER BrownN RN  [facility]

## (undated) DEVICE — SPONGE LAP 18X18" X8435

## (undated) DEVICE — GLOVE BIOGEL PI ULTRATOUCH G SZ 6.5 42165

## (undated) DEVICE — BLADE KNIFE SURG 10 371110

## (undated) DEVICE — DRILL BIT CANNULATED 16MM FLEX

## (undated) DEVICE — LUBRICATING JELLY 4.25OZ

## (undated) DEVICE — SOL WATER IRRIG 1000ML BOTTLE 2F7114

## (undated) DEVICE — SU PROLENE 5-0 P-3 18" 8698G

## (undated) DEVICE — EYE KNIFE SLIT XSTAR VISITEC 3.0MM 45DEG 373730

## (undated) DEVICE — EYE PREP BETADINE 5% SOLUTION 30ML 0065-0411-30

## (undated) DEVICE — DRSG AQUACEL AG HYDROFIBER 3.5X6" 422604

## (undated) DEVICE — PREP CHLORAPREP 26ML TINTED ORANGE  260815

## (undated) DEVICE — SPECIMEN CONTAINER 3OZ W/FORMALIN 59901

## (undated) DEVICE — GLOVE BIOGEL PI ULTRATOUCH G SZ 8.5 42185

## (undated) DEVICE — SUCTION CANISTER MEDIVAC LINER 3000ML W/LID 65651-530

## (undated) DEVICE — NDL ANGIOCATH 20GA 1.25" 4056

## (undated) DEVICE — WIRE GUIDE 3.2X400MM  357.399

## (undated) DEVICE — MAT SURGICAL FLOOR ABSORBANT 40X36" WHITE 5006W

## (undated) DEVICE — KIT DRAIN CLOSED WOUND SUCTION MED 400ML RESVR

## (undated) DEVICE — IMP CABLE W/CRIMP SYN 1.7X750MM 298.801.01S: Type: IMPLANTABLE DEVICE | Site: FEMUR | Status: NON-FUNCTIONAL

## (undated) DEVICE — ENDO BITE BLOCK ADULT OMNI-BLOC

## (undated) DEVICE — KIT PATIENT CARE HANA TABLE PROFX SUPINE 6855

## (undated) DEVICE — ROD SYN REAMER BALL TIP 2.5X950MM  351.706S

## (undated) DEVICE — DRAIN PENROSE 0.25"X18" LATEX FREE GR201

## (undated) DEVICE — GLOVE UNDER INDICATOR PI SZ 6.5 LF 41665

## (undated) DEVICE — SU MONOCRYL 3-0 PS-2 18" UND Y497G

## (undated) DEVICE — TUBING SUCTION 12"X1/4" N612

## (undated) DEVICE — DRAPE IOBAN INCISE 36X23" 6651EZ

## (undated) DEVICE — SOL NACL 0.9% IRRIG 1000ML BOTTLE 07138-09

## (undated) DEVICE — SU VICRYL 5-0 P-2 18" UND J503G

## (undated) DEVICE — SU VICRYL 2-0 CT-1 27" UND J259H

## (undated) DEVICE — STPL SKIN 35W 6.9MM  PXW35

## (undated) DEVICE — GLOVE BIOGEL PI INDICATOR 8.0 LF 41680

## (undated) DEVICE — DRSG STERI STRIP 1/4X3" R1541

## (undated) DEVICE — SU ETHILON 6-0 P-1 18" 697G

## (undated) DEVICE — KIT ENDO TURNOVER/PROCEDURE CARRY-ON 101822

## (undated) DEVICE — SYR 30ML SLIP TIP W/O NDL 302833

## (undated) DEVICE — PACK OCULOPLATIC SEN15OCFSD

## (undated) DEVICE — ESU PENCIL W/SMOKE EVAC E2515HS

## (undated) DEVICE — DRAPE C-ARM 60X42" 1013

## (undated) DEVICE — DRSG DRAIN 4X4" 7086

## (undated) DEVICE — SU PLAIN FAST ABSORB 6-0 PC-1 18" 1916G

## (undated) DEVICE — GLOVE PROTEXIS W/NEU-THERA 8.5  2D73TE85

## (undated) DEVICE — PACKING NUGAUZE 1/2" PLAIN 7632

## (undated) DEVICE — ESU ELEC NDL 1" E1552

## (undated) DEVICE — DRSG XEROFORM 1X8"

## (undated) DEVICE — DRILL BIL CANNULATED 6MM/9MM 03.037.022

## (undated) DEVICE — DRILL BIT CANNULATED 10MM TAPERED

## (undated) DEVICE — LINEN TOWEL PACK X5 5464

## (undated) DEVICE — SOL WATER IRRIG 1000ML BOTTLE 07139-09

## (undated) DEVICE — PACK MINOR PROCEDURE CUSTOM

## (undated) DEVICE — BNDG ELASTIC 4"X5YDS UNSTERILE 6611-40

## (undated) DEVICE — SU VICRYL 5-0 P-3 18" UND J493H

## (undated) DEVICE — GLOVE EXAM NITRILE LG

## (undated) DEVICE — SU VICRYL 0 CP-1 27" UND J267H

## (undated) DEVICE — PEN MARKING SKIN FINE 31145942

## (undated) DEVICE — DRILL BIT QUICK COUPLING 3 FLUTE 4.2MMX330/100MM CALIBRATE

## (undated) DEVICE — SPECIMEN BAG MEDIVAC SUCTION WHITE SOCK 65652-122

## (undated) DEVICE — DRAPE IOBAN ISOLATION VERTICAL 320X21CM 6617

## (undated) DEVICE — SUCTION MANIFOLD NEPTUNE 2 SYS 4 PORT 0702-020-000

## (undated) RX ORDER — ALBUMIN (HUMAN) 12.5 G/50ML
SOLUTION INTRAVENOUS
Status: DISPENSED
Start: 2024-12-30

## (undated) RX ORDER — LIDOCAINE HYDROCHLORIDE 10 MG/ML
INJECTION, SOLUTION EPIDURAL; INFILTRATION; INTRACAUDAL; PERINEURAL
Status: DISPENSED
Start: 2024-12-05

## (undated) RX ORDER — BUPIVACAINE HYDROCHLORIDE 2.5 MG/ML
INJECTION, SOLUTION EPIDURAL; INFILTRATION; INTRACAUDAL
Status: DISPENSED
Start: 2024-05-20

## (undated) RX ORDER — LIDOCAINE HYDROCHLORIDE 10 MG/ML
INJECTION, SOLUTION EPIDURAL; INFILTRATION; INTRACAUDAL; PERINEURAL
Status: DISPENSED
Start: 2024-12-30

## (undated) RX ORDER — FENTANYL CITRATE 50 UG/ML
INJECTION, SOLUTION INTRAMUSCULAR; INTRAVENOUS
Status: DISPENSED
Start: 2024-05-20

## (undated) RX ORDER — HYDROMORPHONE HYDROCHLORIDE 1 MG/ML
INJECTION, SOLUTION INTRAMUSCULAR; INTRAVENOUS; SUBCUTANEOUS
Status: DISPENSED
Start: 2024-05-20

## (undated) RX ORDER — ONDANSETRON 2 MG/ML
INJECTION INTRAMUSCULAR; INTRAVENOUS
Status: DISPENSED
Start: 2018-10-16

## (undated) RX ORDER — ALBUMIN (HUMAN) 12.5 G/50ML
SOLUTION INTRAVENOUS
Status: DISPENSED
Start: 2024-12-23

## (undated) RX ORDER — LIDOCAINE HYDROCHLORIDE 10 MG/ML
INJECTION, SOLUTION EPIDURAL; INFILTRATION; INTRACAUDAL; PERINEURAL
Status: DISPENSED
Start: 2024-05-20

## (undated) RX ORDER — LIDOCAINE HYDROCHLORIDE 10 MG/ML
INJECTION, SOLUTION EPIDURAL; INFILTRATION; INTRACAUDAL; PERINEURAL
Status: DISPENSED
Start: 2024-12-23

## (undated) RX ORDER — FENTANYL CITRATE 50 UG/ML
INJECTION, SOLUTION INTRAMUSCULAR; INTRAVENOUS
Status: DISPENSED
Start: 2024-11-11

## (undated) RX ORDER — FENTANYL CITRATE 50 UG/ML
INJECTION, SOLUTION INTRAMUSCULAR; INTRAVENOUS
Status: DISPENSED
Start: 2018-10-16

## (undated) RX ORDER — ONDANSETRON 2 MG/ML
INJECTION INTRAMUSCULAR; INTRAVENOUS
Status: DISPENSED
Start: 2024-05-20

## (undated) RX ORDER — DIPHENHYDRAMINE HYDROCHLORIDE 50 MG/ML
INJECTION INTRAMUSCULAR; INTRAVENOUS
Status: DISPENSED
Start: 2019-04-24

## (undated) RX ORDER — CEFAZOLIN SODIUM 1 G/3ML
INJECTION, POWDER, FOR SOLUTION INTRAMUSCULAR; INTRAVENOUS
Status: DISPENSED
Start: 2018-10-16

## (undated) RX ORDER — CEFAZOLIN SODIUM/WATER 2 G/20 ML
SYRINGE (ML) INTRAVENOUS
Status: DISPENSED
Start: 2024-05-20

## (undated) RX ORDER — BUPIVACAINE HYDROCHLORIDE 5 MG/ML
INJECTION, SOLUTION EPIDURAL; INTRACAUDAL
Status: DISPENSED
Start: 2024-05-20

## (undated) RX ORDER — DEXAMETHASONE SODIUM PHOSPHATE 10 MG/ML
INJECTION, SOLUTION INTRAMUSCULAR; INTRAVENOUS
Status: DISPENSED
Start: 2024-05-20

## (undated) RX ORDER — HYDROCODONE BITARTRATE AND ACETAMINOPHEN 5; 325 MG/1; MG/1
TABLET ORAL
Status: DISPENSED
Start: 2018-10-16

## (undated) RX ORDER — LIDOCAINE HYDROCHLORIDE 10 MG/ML
INJECTION, SOLUTION EPIDURAL; INFILTRATION; INTRACAUDAL; PERINEURAL
Status: DISPENSED
Start: 2024-11-26

## (undated) RX ORDER — LIDOCAINE HYDROCHLORIDE 10 MG/ML
INJECTION, SOLUTION EPIDURAL; INFILTRATION; INTRACAUDAL; PERINEURAL
Status: DISPENSED
Start: 2024-12-16

## (undated) RX ORDER — ALBUMIN (HUMAN) 12.5 G/50ML
SOLUTION INTRAVENOUS
Status: DISPENSED
Start: 2024-12-16

## (undated) RX ORDER — FENTANYL CITRATE 50 UG/ML
INJECTION, SOLUTION INTRAMUSCULAR; INTRAVENOUS
Status: DISPENSED
Start: 2019-04-24

## (undated) RX ORDER — FENTANYL CITRATE-0.9 % NACL/PF 10 MCG/ML
PLASTIC BAG, INJECTION (ML) INTRAVENOUS
Status: DISPENSED
Start: 2024-05-20